# Patient Record
Sex: FEMALE | Race: WHITE | Employment: OTHER | URBAN - METROPOLITAN AREA
[De-identification: names, ages, dates, MRNs, and addresses within clinical notes are randomized per-mention and may not be internally consistent; named-entity substitution may affect disease eponyms.]

---

## 2017-01-02 ENCOUNTER — TELEPHONE (OUTPATIENT)
Dept: FAMILY MEDICINE | Facility: CLINIC | Age: 82
End: 2017-01-02

## 2017-01-02 DIAGNOSIS — R11.0 NAUSEA: Primary | ICD-10-CM

## 2017-01-02 RX ORDER — ONDANSETRON 4 MG/1
4 TABLET, ORALLY DISINTEGRATING ORAL EVERY 6 HOURS PRN
Qty: 30 TABLET | Refills: 3 | Status: SHIPPED | OUTPATIENT
Start: 2017-01-02 | End: 2017-08-09

## 2017-01-02 NOTE — TELEPHONE ENCOUNTER
Patient is requesting a new prescription for Zofran 4mg ODT tabs - not on her current med list.    Thanks,  Angelica Richards, Russ  [facility]  [phone]

## 2017-01-10 ENCOUNTER — TELEPHONE (OUTPATIENT)
Dept: FAMILY MEDICINE | Facility: CLINIC | Age: 82
End: 2017-01-10

## 2017-01-10 NOTE — TELEPHONE ENCOUNTER
Reason for Call:  Home Health Care    Tati with  Homecare called regarding (reason for call):       Orders are needed for this patient.       PT:       OT:       Skilled Nursing: private pay and HHA as requested by pt  And recertification of the orders      Pt Provider:       Phone Number Homecare Nurse can be reached at: [phone]    Can we leave a detailed message on this number? YES        Best Time: anytime    Call taken on 1/10/2017 at 1:12 PM by Tanya Clinton

## 2017-01-31 DIAGNOSIS — G89.4 CHRONIC PAIN SYNDROME: ICD-10-CM

## 2017-01-31 DIAGNOSIS — M54.9 INTRACTABLE BACK PAIN: Primary | ICD-10-CM

## 2017-01-31 NOTE — TELEPHONE ENCOUNTER
Controlled Substance Refill Request for   traMADol (ULTRAM) 50 MG tablet 100 tablet 1 9/13/2016       Problem List Complete:  Yes    Last Written Prescription Date:  09/13/2016  Last Fill Quantity: 100,   # refills: 1    Last Office Visit with [facility] primary care provider: 10/28/2016    Clinic visit frequency required: Q 3 months     Future Office visit:     Controlled substance agreement on file: Yes:  Date 10/05/2016.     Processing:  Fax Rx to [facility] pharmacy   checked in past 6 months?  No, route to RN

## 2017-01-31 NOTE — TELEPHONE ENCOUNTER
Controlled Substance Refill Request for Tramadol  Problem List Complete:  Yes    Last Written Prescription Date:  09/13/16  Last Fill Quantity: 100,   # refills: 1    Last Office Visit with [facility] primary care provider: 10/28/16    Clinic visit frequency required:      Future Office visit:     Controlled substance agreement on file: Yes:  Date 09/27/16.     Processing:  Fax Rx to [facility] pharmacy   checked in past 6 months?  February 1, 2017  Holly Benavidez RN

## 2017-02-01 RX ORDER — TRAMADOL HYDROCHLORIDE 50 MG/1
TABLET ORAL
Qty: 100 TABLET | Refills: 1 | Status: SHIPPED | OUTPATIENT
Start: 2017-02-01 | End: 2017-06-24

## 2017-02-10 DIAGNOSIS — L29.9 ITCHING: Primary | ICD-10-CM

## 2017-02-10 RX ORDER — HYDROXYZINE PAMOATE 25 MG/1
CAPSULE ORAL
Qty: 180 CAPSULE | Refills: 1 | Status: SHIPPED | OUTPATIENT
Start: 2017-02-10 | End: 2017-04-25

## 2017-02-10 NOTE — TELEPHONE ENCOUNTER
Prescription approved per [facility] Refill Protocol.  Albina Zavala RN

## 2017-02-10 NOTE — TELEPHONE ENCOUNTER
hydrOXYzine (VISTARIL) 25 MG capsule      Last Written Prescription Date: 8/12/2016  Last Fill Quantity: 60,  # refills: 1   Last Office Visit with [facility], [facility] or [facility] prescribing provider: 10/28/2016

## 2017-02-14 ENCOUNTER — OFFICE VISIT (OUTPATIENT)
Dept: CARDIOLOGY | Facility: CLINIC | Age: 82
End: 2017-02-14
Attending: NURSE PRACTITIONER
Payer: MEDICARE

## 2017-02-14 VITALS
WEIGHT: 155.1 LBS | DIASTOLIC BLOOD PRESSURE: 62 MMHG | BODY MASS INDEX: 25.84 KG/M2 | HEART RATE: 72 BPM | HEIGHT: 65 IN | SYSTOLIC BLOOD PRESSURE: 134 MMHG

## 2017-02-14 DIAGNOSIS — I48.91 ATRIAL FIBRILLATION, UNSPECIFIED TYPE (H): ICD-10-CM

## 2017-02-14 PROCEDURE — 99214 OFFICE O/P EST MOD 30 MIN: CPT | Performed by: INTERNAL MEDICINE

## 2017-02-14 NOTE — LETTER
2/14/2017    Neisha Esparza MD  [facility]      [address]  [place] [address]    RE: Helene Gibbs       Dear Colleague,    It was my pleasure seeing Ms. Helene Gibbs in follow-up of permanent atrial fibrillation with previous AV node ablation and pacemaker implantation, HFpEF (heart failure with preserved ejection fraction) and hypertension.  She is a delightful 81-year-old woman.  We have been following her for years.  She has done better after AV node ablation procedure as her rapid atrial fibrillation used to exacerbate her CHF on several occasions.      In 2015, she had issues with 2 bouts of C. difficile colitis during which she lost about 20-30 pounds.  More recently she has had difficulties with her peripheral neuropathy and spinal stenosis.  She has been approved for medical marijuana due to chronic pain in her feet and back.  She was having issues with a rectal fissure, but this has healed.      There have been no issues with fluid retention over the past year.  Her dry weight at home is approximately 150 pounds.  With her winter clothes and shoes on, she weighed 155 pounds in our clinic today.  Helene has had no chest pain, orthopnea or PND.  Her physical activity is limited because of the above issues.      PHYSICAL EXAMINATION:   VITAL SIGNS:  Blood pressure 134/62, pulse 72 and regular, weight 70.4 kg, height 165 cm.   GENERAL:  She is a very pleasant woman in no apparent distress.   NECK:  Supple with normal jugular venous pressure.   CHEST:  With moderate kyphosis.  Lungs are clear.  No apparent crackles or obvious pleural effusions.   CARDIOVASCULAR:  Regular paced rhythm.  No gallop.  There is a 2/6 systolic murmur at the xiphoid.   ABDOMEN:  Soft, nontender.   EXTREMITIES:  With chronic venous stasis changes bilaterally.  There is trace edema.      DIAGNOSTIC STUDIES:  Most recent laboratory tests:  Creatinine 1.2, potassium 4.0, sodium 135.      Interrogation of  her permanent pacemaker has revealed estimated battery life of approximately 9-10 years.  Following her AV node ablation, she does have an escape rhythm in the low 30s.     Outpatient Encounter Prescriptions as of 2/14/2017   Medication Sig Dispense Refill     hydrOXYzine (VISTARIL) 25 MG capsule TAKE 1 CAPSULE (25 MG) BY MOUTH 2 TIMES DAILY AS NEEDED FOR ITCHING 180 capsule 1     traMADol (ULTRAM) 50 MG tablet TAKE 1 TABLET BY MOUTH EVERY 6 HOURS AS NEEDED FOR MODERATE PAIN 100 tablet 1     ondansetron (ZOFRAN ODT) 4 MG ODT tab Take 1 tablet (4 mg) by mouth every 6 hours as needed for nausea 30 tablet 3     HYDROmorphone (DILAUDID) 2 MG tablet Take 1 tablet (2 mg) by mouth 2 times daily as needed for moderate to severe pain 60 tablet 0     glipiZIDE (GLUCOTROL) 5 MG tablet TAKE 1 TABLET BY MOUTH EVERY MORNING AND 0.5 TABLET EVERY EVENING 135 tablet 1     simvastatin (ZOCOR) 10 MG tablet Take 1 tablet (10 mg) by mouth At Bedtime 90 tablet 1     PROCTOSOL HC 2.5 % rectal cream PLACE RECTALLY 2 TIMES DAILY AS NEEDED FOR HEMORRHOIDS 28.35 g 5     COMPOUND (CMPD RX) - PHARMACY TO MIX COMPOUNDED MEDICATION Vaginal valium- 10 mg suppository 42 suppository 3     Tiotropium Bromide Monohydrate (SPIRIVA HANDIHALER IN)        fluocinolone (SYNALAR) 0.01 % external solution   1     betamethasone valerate (VALISONE) 0.1 % cream Apply topically 2 times daily Use sparingly 15 g 1     hydrocortisone (ANUSOL-HC) 2.5 % rectal cream Place rectally 2 times daily as needed for hemorrhoids 28 g 1     lisinopril (PRINIVIL,ZESTRIL) 10 MG tablet TAKE 1 TABLET (10 MG) BY MOUTH DAILY 90 tablet 1     apixaban ANTICOAGULANT (ELIQUIS) 5 MG tablet Take 1 tablet (5 mg) by mouth 2 times daily 180 tablet 3     fluticasone (FLONASE) 50 MCG/ACT nasal spray Spray 2 sprays into both nostrils daily        furosemide (LASIX) 20 MG tablet Take 20 mg by mouth daily Repeat every aftenoon prn weight gain of 2 lbs and or 2+ edema lower extremities        Multiple Vitamins-Minerals (PRESERVISION/LUTEIN) CAPS Take 1 capsule by mouth 2 times daily       nystatin (MYCOSTATIN) 175429 UNIT/GM POWD Apply topically 2 times daily as needed 60 g 1     albuterol (PROAIR HFA, PROVENTIL HFA, VENTOLIN HFA) 108 (90 BASE) MCG/ACT inhaler Inhale 2 puffs into the lungs every 4 hours as needed for shortness of breath / dyspnea or wheezing 1 Inhaler 5     magnesium hydroxide (MILK OF MAGNESIA) 400 MG/5ML suspension Take 30 mLs by mouth At Bedtime        senna-docusate (SENOKOT-S;PERICOLACE) 8.6-50 MG per tablet Take 2 tablets by mouth 2 times daily       hydrocortisone 0.5 % cream Apply topically 3 times daily as needed for itching (on arms) SEND HOME WITH PATIENT       oxybutynin (DITROPAN XL) 5 MG 24 hr tablet Take 1 tablet (5 mg) by mouth daily (Patient not taking: Reported on 2/14/2017) 90 tablet 3     [DISCONTINUED] PROCTOSOL HC 2.5 % cream PLACE RECTALLY 2 TIMES DAILY AS NEEDED FOR HEMORRHOIDS 28.35 g 1     [DISCONTINUED] opium-belladonna (B&O SUPPRETTES) 30-16.2 MG suppository Place 0.5 suppositories rectally every 8 hours as needed for moderate pain or bladder spasms 20 suppository 0     metroNIDAZOLE (FLAGYL) 500 MG tablet Take 1 tablet (500 mg) by mouth 2 times daily (Patient not taking: Reported on 2/14/2017) 20 tablet 0     [DISCONTINUED] glipiZIDE (GLIPIZIDE XL) 2.5 MG 24 hr tablet Take 1 tablet (2.5 mg) by mouth daily (Patient not taking: Reported on 2/14/2017) 90 tablet 1     ACCU-CHEK SMARTVIEW test strip 1 strip by In Vitro route 2 times daily Or as directed 100 each 1     blood glucose monitoring (ACCU-CHEK FASTCLIX) lancets USE TO TEST BLOOD SUGAR TWO TIMES A DAY OR AS DIRECTED 1 Box 11     ORDER FOR DME, SET TO LOCAL PRINT, Equipment being ordered: AccuChek Smart View strips  Patient tests twice daily. 1 each 11     No facility-administered encounter medications on file as of 2/14/2017.       IMPRESSION:   1.  History of rapid atrial fibrillation; s/p AV node  ablation and pacemaker implantation in 2012.  Her device functions well.  She is a compliant patient with regular follow-up in our clinic.  She is on apixaban for anticoagulation.  This has worked better than warfarin, as her INRs used to be very labile.  She is now approaching age 82 and her kidney function is moderately decreased.  We have to reconsider every year whether she should be on the regular or lower apixaban dose.  For the time being, according to guidelines, 5 mg b.i.d. is the appropriate dose.   2.  Diastolic heart failure.  Most recent echocardiogram was in 12/2015 showed normal LV function with EF of 60%-65%.  She did have mild to moderate pulmonary hypertension at that time with estimated RVSP of 42 mmHg plus right atrial pressure.  The patient appears to be euvolemic at this point.   3.  Tricuspid regurgitation.  This was 3+ by echocardiography in 2015.  I do appreciate a TR murmur today.  She does not have symptoms or signs of right-sided heart failure at this point.      RECOMMENDATIONS:   A.  Repeat echocardiogram in 1 year with follow-up with Angelica at that time.   B.  No medication changes today.      It was my pleasure seeing Ms. Gibbs.  Time spent for this appointment was 25 minutes, with greater than 50% of the time devoted to discussion and counseling.     Sincerely,    Jc Juarez MD     [facility]

## 2017-02-14 NOTE — MR AVS SNAPSHOT
After Visit Summary   2/14/2017    Helene Gibbs    MRN: [id number]           Patient Information     Date Of Birth          1935        Visit Information        Provider Department      2/14/2017 2:45 PM Jc Juarez MD [facility]        Today's Diagnoses     Atrial fibrillation, unspecified type (H)           Follow-ups after your visit        Additional Services     Follow-Up with Cardiac Advanced Practice Provider                 Your next 10 appointments already scheduled     Mar 27, 2017  1:30 PM CDT   [facility] with [facility]   [facility])    [address] [place] [id number] [phone]              Future tests that were ordered for you today     Open Future Orders        Priority Expected Expires Ordered    Follow-Up with Cardiac Advanced Practice Provider Routine 2/14/2018 6/29/2018 2/14/2017    Echocardiogram Routine 2/14/2018 3/21/2018 2/14/2017            Who to contact     If you have questions or need follow up information about today's clinic visit or your schedule please contact [facility] directly at [phone].  Normal or non-critical lab and imaging results will be communicated to you by CheckBonushart, letter or phone within 4 business days after the clinic has received the results. If you do not hear from us within 7 days, please contact the clinic through CheckBonushart or phone. If you have a critical or abnormal lab result, we will notify you by phone as soon as possible.  Submit refill requests through Lifeproof or call your pharmacy and they will forward the refill request to us. Please allow 3 business days for your refill to be completed.          Additional Information About Your Visit        MyChart Information     [employer] lets you send messages to your doctor, view your test results, renew your  "prescriptions, schedule appointments and more. To sign up, go to [email or web address] . Click on \"Log in\" on the left side of the screen, which will take you to the Welcome page. Then click on \"Sign up Now\" on the right side of the page.     You will be asked to enter the access code listed below, as well as some personal information. Please follow the directions to create your username and password.     Your access code is: [email or web address]  Expires: 5/15/2017  3:18 PM     Your access code will  in 90 days. If you need help or a new code, please call your [facility] clinic or [phone].        Care EveryWhere ID     This is your Care EveryWhere ID. This could be used by other organizations to access your [facility] medical records  [id number]        Your Vitals Were     Pulse Height Last Period BMI (Body Mass Index)          72 1.651 m (5' 5\") (LMP Unknown) 25.81 kg/m2         Blood Pressure from Last 3 Encounters:   17 134/62   16 137/79   16 152/80    Weight from Last 3 Encounters:   17 70.4 kg (155 lb 1.6 oz)   16 70.8 kg (156 lb 1.6 oz)   16 69.2 kg (152 lb 8 oz)              We Performed the Following     Follow-Up with Electrophysiologist        Primary Care Provider Office Phone # Fax #    Neisha MARIMAR Esparza [phone]       [facility]    [address]  [facility] [address]        Goals        General    start date: 14 I will weigh myself daily and if any weight gain or shortness of breath I will call the clinic. (pt-stated)     Notes - Note created  2014  3:59 PM by Margareth Pichardo, RN    As of today's date 2014 goal is met at 0 - 25%.   Goal Status:  Active        Thank you!     Thank you for choosing [facility]  for your care. Our goal is always to provide you with excellent care. Hearing back from our patients is one way we can continue to improve our " services. Please take a few minutes to complete the written survey that you may receive in the mail after your visit with us. Thank you!             Your Updated Medication List - Protect others around you: Learn how to safely use, store and throw away your medicines at www.disposemymeds.org.          This list is accurate as of: 2/14/17  3:18 PM.  Always use your most recent med list.                   Brand Name Dispense Instructions for use    ACCU-CHEK SMARTVIEW test strip   Generic drug:  blood glucose monitoring     100 each    1 strip by In Vitro route 2 times daily Or as directed       albuterol 108 (90 BASE) MCG/ACT Inhaler    PROAIR HFA/PROVENTIL HFA/VENTOLIN HFA    1 Inhaler    Inhale 2 puffs into the lungs every 4 hours as needed for shortness of breath / dyspnea or wheezing       apixaban ANTICOAGULANT 5 MG tablet    ELIQUIS    180 tablet    Take 1 tablet (5 mg) by mouth 2 times daily       betamethasone valerate 0.1 % cream    VALISONE    15 g    Apply topically 2 times daily Use sparingly       blood glucose monitoring lancets     1 Box    USE TO TEST BLOOD SUGAR TWO TIMES A DAY OR AS DIRECTED       COMPOUND - PHARMACY TO MIX COMPOUNDED MEDICATION    CMPD RX    42 suppository    Vaginal valium- 10 mg suppository       fluocinolone 0.01 % solution    SYNALAR         fluticasone 50 MCG/ACT spray    FLONASE     Spray 2 sprays into both nostrils daily       furosemide 20 MG tablet    LASIX     Take 20 mg by mouth daily Repeat every aftenoon prn weight gain of 2 lbs and or 2+ edema lower extremities       glipiZIDE 5 MG tablet    GLUCOTROL    135 tablet    TAKE 1 TABLET BY MOUTH EVERY MORNING AND 0.5 TABLET EVERY EVENING       hydrocortisone 0.5 % cream      Apply topically 3 times daily as needed for itching (on arms) SEND HOME WITH PATIENT       * hydrocortisone 2.5 % cream    ANUSOL-HC    28 g    Place rectally 2 times daily as needed for hemorrhoids       * PROCTOSOL HC 2.5 % cream   Generic drug:   hydrocortisone     28.35 g    PLACE RECTALLY 2 TIMES DAILY AS NEEDED FOR HEMORRHOIDS       HYDROmorphone 2 MG tablet    DILAUDID    60 tablet    Take 1 tablet (2 mg) by mouth 2 times daily as needed for moderate to severe pain       hydrOXYzine 25 MG capsule    VISTARIL    180 capsule    TAKE 1 CAPSULE (25 MG) BY MOUTH 2 TIMES DAILY AS NEEDED FOR ITCHING       lisinopril 10 MG tablet    PRINIVIL/ZESTRIL    90 tablet    TAKE 1 TABLET (10 MG) BY MOUTH DAILY       magnesium hydroxide 400 MG/5ML suspension    MILK OF MAGNESIA     Take 30 mLs by mouth At Bedtime       metroNIDAZOLE 500 MG tablet    FLAGYL    20 tablet    Take 1 tablet (500 mg) by mouth 2 times daily       nystatin 370865 UNIT/GM Powd    MYCOSTATIN    60 g    Apply topically 2 times daily as needed       ondansetron 4 MG ODT tab    ZOFRAN ODT    30 tablet    Take 1 tablet (4 mg) by mouth every 6 hours as needed for nausea       order for DME     1 each    Equipment being ordered: AccuChek Smart View strips Patient tests twice daily.       oxybutynin 5 MG 24 hr tablet    DITROPAN XL    90 tablet    Take 1 tablet (5 mg) by mouth daily       PRESERVISION/LUTEIN Caps      Take 1 capsule by mouth 2 times daily       senna-docusate 8.6-50 MG per tablet    SENOKOT-S;PERICOLACE     Take 2 tablets by mouth 2 times daily       simvastatin 10 MG tablet    ZOCOR    90 tablet    Take 1 tablet (10 mg) by mouth At Bedtime       SPIRIVA HANDIHALER IN          traMADol 50 MG tablet    ULTRAM    100 tablet    TAKE 1 TABLET BY MOUTH EVERY 6 HOURS AS NEEDED FOR MODERATE PAIN       * Notice:  This list has 2 medication(s) that are the same as other medications prescribed for you. Read the directions carefully, and ask your doctor or other care provider to review them with you.

## 2017-02-14 NOTE — PROGRESS NOTES
HPI and Plan:   See dictation    Orders Placed This Encounter   Procedures     Follow-Up with Cardiac Advanced Practice Provider     Echocardiogram       No orders of the defined types were placed in this encounter.      Medications Discontinued During This Encounter   Medication Reason     glipiZIDE (GLIPIZIDE XL) 2.5 MG 24 hr tablet Discontinued by another Health Care Provider     opium-belladonna (B&O SUPPRETTES) 30-16.2 MG suppository Discontinued by another Health Care Provider     PROCTOSOL HC 2.5 % cream Medication Reconciliation Clean Up         Encounter Diagnosis   Name Primary?     Atrial fibrillation, unspecified type (H)        CURRENT MEDICATIONS:  Current Outpatient Prescriptions   Medication Sig Dispense Refill     hydrOXYzine (VISTARIL) 25 MG capsule TAKE 1 CAPSULE (25 MG) BY MOUTH 2 TIMES DAILY AS NEEDED FOR ITCHING 180 capsule 1     traMADol (ULTRAM) 50 MG tablet TAKE 1 TABLET BY MOUTH EVERY 6 HOURS AS NEEDED FOR MODERATE PAIN 100 tablet 1     ondansetron (ZOFRAN ODT) 4 MG ODT tab Take 1 tablet (4 mg) by mouth every 6 hours as needed for nausea 30 tablet 3     HYDROmorphone (DILAUDID) 2 MG tablet Take 1 tablet (2 mg) by mouth 2 times daily as needed for moderate to severe pain 60 tablet 0     glipiZIDE (GLUCOTROL) 5 MG tablet TAKE 1 TABLET BY MOUTH EVERY MORNING AND 0.5 TABLET EVERY EVENING 135 tablet 1     simvastatin (ZOCOR) 10 MG tablet Take 1 tablet (10 mg) by mouth At Bedtime 90 tablet 1     PROCTOSOL HC 2.5 % rectal cream PLACE RECTALLY 2 TIMES DAILY AS NEEDED FOR HEMORRHOIDS 28.35 g 5     COMPOUND (CMPD RX) - PHARMACY TO MIX COMPOUNDED MEDICATION Vaginal valium- 10 mg suppository 42 suppository 3     Tiotropium Bromide Monohydrate (SPIRIVA HANDIHALER IN)        fluocinolone (SYNALAR) 0.01 % external solution   1     betamethasone valerate (VALISONE) 0.1 % cream Apply topically 2 times daily Use sparingly 15 g 1     hydrocortisone (ANUSOL-HC) 2.5 % rectal cream Place rectally 2 times daily  as needed for hemorrhoids 28 g 1     lisinopril (PRINIVIL,ZESTRIL) 10 MG tablet TAKE 1 TABLET (10 MG) BY MOUTH DAILY 90 tablet 1     apixaban ANTICOAGULANT (ELIQUIS) 5 MG tablet Take 1 tablet (5 mg) by mouth 2 times daily 180 tablet 3     fluticasone (FLONASE) 50 MCG/ACT nasal spray Spray 2 sprays into both nostrils daily        furosemide (LASIX) 20 MG tablet Take 20 mg by mouth daily Repeat every aftenoon prn weight gain of 2 lbs and or 2+ edema lower extremities       Multiple Vitamins-Minerals (PRESERVISION/LUTEIN) CAPS Take 1 capsule by mouth 2 times daily       nystatin (MYCOSTATIN) 349518 UNIT/GM POWD Apply topically 2 times daily as needed 60 g 1     albuterol (PROAIR HFA, PROVENTIL HFA, VENTOLIN HFA) 108 (90 BASE) MCG/ACT inhaler Inhale 2 puffs into the lungs every 4 hours as needed for shortness of breath / dyspnea or wheezing 1 Inhaler 5     magnesium hydroxide (MILK OF MAGNESIA) 400 MG/5ML suspension Take 30 mLs by mouth At Bedtime        senna-docusate (SENOKOT-S;PERICOLACE) 8.6-50 MG per tablet Take 2 tablets by mouth 2 times daily       hydrocortisone 0.5 % cream Apply topically 3 times daily as needed for itching (on arms) SEND HOME WITH PATIENT       oxybutynin (DITROPAN XL) 5 MG 24 hr tablet Take 1 tablet (5 mg) by mouth daily (Patient not taking: Reported on 2/14/2017) 90 tablet 3     metroNIDAZOLE (FLAGYL) 500 MG tablet Take 1 tablet (500 mg) by mouth 2 times daily (Patient not taking: Reported on 2/14/2017) 20 tablet 0     ACCU-CHEK SMARTVIEW test strip 1 strip by In Vitro route 2 times daily Or as directed 100 each 1     blood glucose monitoring (ACCU-CHEK FASTCLIX) lancets USE TO TEST BLOOD SUGAR TWO TIMES A DAY OR AS DIRECTED 1 Box 11     ORDER FOR DME, SET TO LOCAL PRINT, Equipment being ordered: AccuChek Smart View strips  Patient tests twice daily. 1 each 11       ALLERGIES     Allergies   Allergen Reactions     Penicillins Hives     Ambien [Zolpidem Tartrate]      Hallucinations and fell  out of bed at night.     Definity      Caused a syncopal episode.     Sulfa Drugs Itching     Cymbalta Rash     Fluconazole Rash       PAST MEDICAL HISTORY:  Past Medical History   Diagnosis Date     Anemia      Ankle arthritis      Arthritis      Back pain      Bell's palsy 9/08     left     Breast CA (H) 2004-     left lumpectomy, radiation, -recurrence     Colon polyps      colonoscopy-9/12-next due in 9/13     Congestive heart failure (H)      COPD (chronic obstructive pulmonary disease) (H)      Coronary artery disease      DM (diabetes mellitus) (H)      GERD (gastroesophageal reflux disease)      Hyperlipidemia      Hypertension      Lumbar spinal stenosis      use cane     Obesity      Osteoporosis      Other chronic pain      Pacemaker      Permanent atrial fibrillation (H) 8/2008     S/P AV node ablation and pacemaker 10-25-12       Pleural effusion      Pulmonary hypertension (H)      Rectal stenosis      Tobacco abuse      current       PAST SURGICAL HISTORY:  Past Surgical History   Procedure Laterality Date     Arthroscopy shoulder rt/lt  1999, 2004     Bilateral, right then left     Joint replacemtn, knee rt/lt       Joint Replacement knee bilateral     Hc colonoscopy thru stoma, diagnostic  ? 2005     C mammogram, screening  1/2009     C dexa, bone density, axial skel       Lumpectomy breast       Left-2004     Implant pacemaker  10/2012     [place] [id number], [id number]     H ablation av node  10/2012     Phacoemulsification clear cornea with standard intraocular lens implant Left 7/16/2015     Procedure: PHACOEMULSIFICATION CLEAR CORNEA WITH STANDARD INTRAOCULAR LENS IMPLANT;  Surgeon: Sai Obregon MD;  Location:  [facility]     Laparoscopic cholecystectomy with cholangiograms N/A 12/14/2015     Procedure: LAPAROSCOPIC CHOLECYSTECTOMY WITH CHOLANGIOGRAMS;  Surgeon: Ervin Amos MD;  Location:  [facility]     Colonoscopy N/A 10/7/2016     Procedure: COMBINED COLONOSCOPY, SINGLE OR MULTIPLE  BIOPSY/POLYPECTOMY BY BIOPSY;  Surgeon: Cassandra Mccord MD;  [facility]       FAMILY HISTORY:  Family History   Problem Relation Age of Onset     Hypertension Mother      DIABETES Mother       at 83 yrs     C.A.D. Father       age 70s       SOCIAL HISTORY:  Social History     Social History     Marital status:      Spouse name: N/A     Number of children: N/A     Years of education: N/A     Social History Main Topics     Smoking status: Current Some Day Smoker     Packs/day: 0.50     Years: 45.00     Types: Cigarettes     Smokeless tobacco: Never Used      Comment: 01/25/15 6 or less cigarettes per day, intermittent     Alcohol use No     Drug use: No     Sexual activity: Not Currently     Partners: Female     Other Topics Concern     Caffeine Concern Yes     2 cups/day     Sleep Concern Yes     Stress Concern Yes     Weight Concern Yes     15+ lb weight loss since hospitalization      Special Diet Yes     bland diet r/t cholecystectomy, low salt      Exercise No     Seat Belt Yes     Social History Narrative    ,no childrenPOA- niece-Enedelia Claros-has plans to quitETOH-1/drink 2-3 /weekExercise-sit down DNR, DNI    Lived in [place] and [place] worked in [employer]       Review of Systems:  Skin:  Negative       Eyes:  Positive for glasses reading  ENT:  Negative      Respiratory:  Negative       Cardiovascular:  Negative      Gastroenterology: Negative      Genitourinary:  not assessed      Musculoskeletal:  Positive for back pain;arthritis    Neurologic:  Positive for numbness or tingling of feet    Psychiatric:  Negative      Heme/Lymph/Imm:  Negative      Endocrine:  Positive for diabetes      [id number]    CC  Angelica Grady APRN CNP  [facility]  [address]  [place] [address]

## 2017-02-15 NOTE — PROGRESS NOTES
HISTORY OF PRESENT ILLNESS:    It was my pleasure seeing Ms. Helene Gibbs in follow-up of permanent atrial fibrillation with previous AV node ablation and pacemaker implantation, HFpEF (heart failure with preserved ejection fraction) and hypertension.  She is a delightful 81-year-old woman.  We have been following her for years.  She has done better after AV node ablation procedure as her rapid atrial fibrillation used to exacerbate her CHF on several occasions.      In 2015, she had issues with 2 bouts of C. difficile colitis during which she lost about 20-30 pounds.  More recently she has had difficulties with her peripheral neuropathy and spinal stenosis.  She has been approved for medical marijuana due to chronic pain in her feet and back.  She was having issues with a rectal fissure, but this has healed.      There have been no issues with fluid retention over the past year.  Her dry weight at home is approximately 150 pounds.  With her winter clothes and shoes on, she weighed 155 pounds in our clinic today.  Helene has had no chest pain, orthopnea or PND.  Her physical activity is limited because of the above issues.      PHYSICAL EXAMINATION:   VITAL SIGNS:  Blood pressure 134/62, pulse 72 and regular, weight 70.4 kg, height 165 cm.   GENERAL:  She is a very pleasant woman in no apparent distress.   NECK:  Supple with normal jugular venous pressure.   CHEST:  With moderate kyphosis.  Lungs are clear.  No apparent crackles or obvious pleural effusions.   CARDIOVASCULAR:  Regular paced rhythm.  No gallop.  There is a 2/6 systolic murmur at the xiphoid.   ABDOMEN:  Soft, nontender.   EXTREMITIES:  With chronic venous stasis changes bilaterally.  There is trace edema.      DIAGNOSTIC STUDIES:  Most recent laboratory tests:  Creatinine 1.2, potassium 4.0, sodium 135.      Interrogation of her permanent pacemaker has revealed estimated battery life of approximately 9-10 years.  Following her AV node ablation, she  does have an escape rhythm in the low 30s.      IMPRESSION:   1.  History of rapid atrial fibrillation; s/p AV node ablation and pacemaker implantation in .  Her device functions well.  She is a compliant patient with regular follow-up in our clinic.  She is on apixaban for anticoagulation.  This has worked better than warfarin, as her INRs used to be very labile.  She is now approaching age 82 and her kidney function is moderately decreased.  We have to reconsider every year whether she should be on the regular or lower apixaban dose.  For the time being, according to guidelines, 5 mg b.i.d. is the appropriate dose.   2.  Diastolic heart failure.  Most recent echocardiogram was in 2015 showed normal LV function with EF of 60%-65%.  She did have mild to moderate pulmonary hypertension at that time with estimated RVSP of 42 mmHg plus right atrial pressure.  The patient appears to be euvolemic at this point.   3.  Tricuspid regurgitation.  This was 3+ by echocardiography in .  I do appreciate a TR murmur today.  She does not have symptoms or signs of right-sided heart failure at this point.      RECOMMENDATIONS:   A.  Repeat echocardiogram in 1 year with follow-up with Angelica at that time.   B.  No medication changes today.      It was my pleasure seeing Ms. Bobo.  Time spent for this appointment was 25 minutes, with greater than 50% of the time devoted to discussion and counseling.      CARSON VILLALPANDO MD, [facility]         cc:   Neisha Esparza MD    [address], [address]    [place] [address]             D: 2017 15:18   T: 02/15/2017 00:38   MT: NATALEE      Name:     PATRICIA BOBO   MRN:      [id number]        Account:      [id number]   :      1935           Service Date: 2017      Document: [id number]

## 2017-03-03 ENCOUNTER — TELEPHONE (OUTPATIENT)
Dept: FAMILY MEDICINE | Facility: CLINIC | Age: 82
End: 2017-03-03

## 2017-03-03 NOTE — TELEPHONE ENCOUNTER
Reason for Call:  Home Health Care    Tati with [facility] called regarding (reason for call): orders    Orders are needed for this patient. Nursing    Skilled Nursing: Delay re-start per patient request for consistency    Phone Number Homecare Nurse can be reached at: [phone]    Can we leave a detailed message on this number? YES    Phone number patient can be reached at: NA    Best Time: anytime    Call taken on 3/3/2017 at 7:48 AM by Betzaida Fisher

## 2017-03-03 NOTE — TELEPHONE ENCOUNTER
Verbal approval given per request below. Homecare/Hospice agency to fax orders for provider signature.  Brandi Hinojosa RN

## 2017-03-27 ENCOUNTER — ALLIED HEALTH/NURSE VISIT (OUTPATIENT)
Dept: CARDIOLOGY | Facility: CLINIC | Age: 82
End: 2017-03-27
Payer: MEDICARE

## 2017-03-27 DIAGNOSIS — Z95.0 CARDIAC PACEMAKER IN SITU: Primary | ICD-10-CM

## 2017-03-27 PROCEDURE — 93293 PM PHONE R-STRIP DEVICE EVAL: CPT | Performed by: INTERNAL MEDICINE

## 2017-03-27 NOTE — MR AVS SNAPSHOT
"              After Visit Summary   3/27/2017    Heleen Gibbs    MRN: [id number]           Patient Information     Date Of Birth          1935        Visit Information        Provider Department      3/27/2017 1:30 PM STONE TECH1 [facility] HE[facility] AT [place]        Today's Diagnoses     Cardiac pacemaker in situ    -  1       Follow-ups after your visit        Your next 10 appointments already scheduled     Apr 25, 2017   Procedure with Duc Richmond MD   [facility] (--)    [address]  [facility] [id number] [phone]            Jun 26, 2017  1:45 PM CDT   Pacemaker Check with STONE DCRN   [facility] ([facility])    [address]  [place] [place] [address] [phone]              Who to contact     If you have questions or need follow up information about today's clinic visit or your schedule please contact [facility] directly at [phone].  Normal or non-critical lab and imaging results will be communicated to you by [employer], letter or phone within 4 business days after the clinic has received the results. If you do not hear from us within 7 days, please contact the clinic through [employer] or phone. If you have a critical or abnormal lab result, we will notify you by phone as soon as possible.  Submit refill requests through Poshly or call your pharmacy and they will forward the refill request to us. Please allow 3 business days for your refill to be completed.          Additional Information About Your Visit        Vita [employer] Information     Poshly lets you send messages to your doctor, view your test results, renew your prescriptions, schedule appointments and more. To sign up, go to [email or web address] . Click on \"Log in\" on the left side of the screen, which will take you to the Welcome page. Then click on \"Sign up Now\" on " the right side of the page.     You will be asked to enter the access code listed below, as well as some personal information. Please follow the directions to create your username and password.     Your access code is: [email or web address]  Expires: 5/15/2017  4:18 PM     Your access code will  in 90 days. If you need help or a new code, please call your [place] clinic or [phone].        Care EveryWhere ID     This is your Care EveryWhere ID. This could be used by other organizations to access your [place] medical records  [id number]        Your Vitals Were     Last Period                   (LMP Unknown)            Blood Pressure from Last 3 Encounters:   17 134/62   16 137/79   16 152/80    Weight from Last 3 Encounters:   17 70.4 kg (155 lb 1.6 oz)   16 70.8 kg (156 lb 1.6 oz)   16 69.2 kg (152 lb 8 oz)              We Performed the Following     PM PHONE R STRIP EVAL UP TO 90 DAYS ([phone])        Primary Care Provider Office Phone # Fax #    Neisha MARIMAR Esparza [phone]       [facility]    [address]  [facility] [address]        Goals        General    start date: 14 I will weigh myself daily and if any weight gain or shortness of breath I will call the clinic. (pt-stated)     Notes - Note created  2014  3:59 PM by Margareth Pichardo, RN    As of today's date 2014 goal is met at 0 - 25%.   Goal Status:  Active        Thank you!     Thank you for choosing [facility] [place]  for your care. Our goal is always to provide you with excellent care. Hearing back from our patients is one way we can continue to improve our services. Please take a few minutes to complete the written survey that you may receive in the mail after your visit with us. Thank you!             Your Updated Medication List - Protect others around you: Learn how to safely use, store and throw away your  medicines at www.disposemymeds.org.          This list is accurate as of: 3/27/17  1:45 PM.  Always use your most recent med list.                   Brand Name Dispense Instructions for use    ACCU-CHEK SMARTVIEW test strip   Generic drug:  blood glucose monitoring     100 each    1 strip by In Vitro route 2 times daily Or as directed       albuterol 108 (90 BASE) MCG/ACT Inhaler    PROAIR HFA/PROVENTIL HFA/VENTOLIN HFA    1 Inhaler    Inhale 2 puffs into the lungs every 4 hours as needed for shortness of breath / dyspnea or wheezing       apixaban ANTICOAGULANT 5 MG tablet    ELIQUIS    180 tablet    Take 1 tablet (5 mg) by mouth 2 times daily       betamethasone valerate 0.1 % cream    VALISONE    15 g    Apply topically 2 times daily Use sparingly       blood glucose monitoring lancets     1 Box    USE TO TEST BLOOD SUGAR TWO TIMES A DAY OR AS DIRECTED       COMPOUND - PHARMACY TO MIX COMPOUNDED MEDICATION    CMPD RX    42 suppository    Vaginal valium- 10 mg suppository       fluocinolone 0.01 % solution    SYNALAR         * fluticasone 50 MCG/ACT spray    FLONASE     Spray 2 sprays into both nostrils daily       * fluticasone 50 MCG/ACT spray    FLONASE    16 mL    INHALE 1 TO 2 SPRAYS INTO BOTH NOSTRILS DAILY       furosemide 20 MG tablet    LASIX     Take 20 mg by mouth daily Repeat every aftenoon prn weight gain of 2 lbs and or 2+ edema lower extremities       glipiZIDE 5 MG tablet    GLUCOTROL    135 tablet    TAKE 1 TABLET BY MOUTH EVERY MORNING AND 0.5 TABLET EVERY EVENING       hydrocortisone 0.5 % cream      Apply topically 3 times daily as needed for itching (on arms) SEND HOME WITH PATIENT       * hydrocortisone 2.5 % cream    ANUSOL-HC    28 g    Place rectally 2 times daily as needed for hemorrhoids       * PROCTOSOL HC 2.5 % cream   Generic drug:  hydrocortisone     28.35 g    PLACE RECTALLY 2 TIMES DAILY AS NEEDED FOR HEMORRHOIDS       HYDROmorphone 2 MG tablet    DILAUDID    60 tablet    Take 1  tablet (2 mg) by mouth 2 times daily as needed for moderate to severe pain       hydrOXYzine 25 MG capsule    VISTARIL    180 capsule    TAKE 1 CAPSULE (25 MG) BY MOUTH 2 TIMES DAILY AS NEEDED FOR ITCHING       lisinopril 10 MG tablet    PRINIVIL/ZESTRIL    90 tablet    TAKE 1 TABLET (10 MG) BY MOUTH DAILY       magnesium hydroxide 400 MG/5ML suspension    MILK OF MAGNESIA     Take 30 mLs by mouth At Bedtime       metroNIDAZOLE 500 MG tablet    FLAGYL    20 tablet    Take 1 tablet (500 mg) by mouth 2 times daily       nystatin 335064 UNIT/GM Powd    MYCOSTATIN    60 g    Apply topically 2 times daily as needed       ondansetron 4 MG ODT tab    ZOFRAN ODT    30 tablet    Take 1 tablet (4 mg) by mouth every 6 hours as needed for nausea       order for DME     1 each    Equipment being ordered: AccuChek Smart View strips Patient tests twice daily.       oxybutynin 5 MG 24 hr tablet    DITROPAN XL    90 tablet    Take 1 tablet (5 mg) by mouth daily       PRESERVISION/LUTEIN Caps      Take 1 capsule by mouth 2 times daily       senna-docusate 8.6-50 MG per tablet    SENOKOT-S;PERICOLACE     Take 2 tablets by mouth 2 times daily       simvastatin 10 MG tablet    ZOCOR    90 tablet    Take 1 tablet (10 mg) by mouth At Bedtime       SPIRIVA HANDIHALER IN          traMADol 50 MG tablet    ULTRAM    100 tablet    TAKE 1 TABLET BY MOUTH EVERY 6 HOURS AS NEEDED FOR MODERATE PAIN       * Notice:  This list has 4 medication(s) that are the same as other medications prescribed for you. Read the directions carefully, and ask your doctor or other care provider to review them with you.

## 2017-03-27 NOTE — PROGRESS NOTES
~ 90 day office teletrace ~  Appropriate  at time of check. Chronic Afib, taking Eliquis. Magnet response WNL. Annual threshold scheduled in June. Bruna RUIZ

## 2017-04-11 DIAGNOSIS — I10 ESSENTIAL HYPERTENSION: ICD-10-CM

## 2017-04-11 RX ORDER — LISINOPRIL 5 MG/1
TABLET ORAL
Qty: 90 TABLET | Refills: 1 | Status: SHIPPED | OUTPATIENT
Start: 2017-04-11 | End: 2017-08-09

## 2017-04-11 NOTE — TELEPHONE ENCOUNTER
lisinopril (PRINIVIL/ZESTRIL) 5 MG tablet        Last Written Prescription Date: 9/13/2016  Last Fill Quantity: 90, # refills: 1  Last Office Visit with [facility], [facility] or [facility] prescribing provider: 10/28/2016  Next 5 appointments (look out 90 days)     Apr 24, 2017  3:00 PM CDT   [facility] Visit with [facility]    [address] [address]   [phone]                   Potassium   Date Value Ref Range Status   10/28/2016 4.0 3.4 - 5.3 mmol/L Final     Creatinine   Date Value Ref Range Status   10/28/2016 1.20 (H) 0.52 - 1.04 mg/dL Final     BP Readings from Last 3 Encounters:   02/14/17 134/62   12/20/16 137/79   11/08/16 152/80

## 2017-04-11 NOTE — TELEPHONE ENCOUNTER
Prescription approved per [facility] Refill Protocol.  Holly Benavidez RN

## 2017-04-24 ENCOUNTER — TELEPHONE (OUTPATIENT)
Dept: CARDIOLOGY | Facility: CLINIC | Age: 82
End: 2017-04-24

## 2017-04-24 NOTE — TELEPHONE ENCOUNTER
Pt called and stated that she needed to have cataract surgery in may and needing to know the hold time for her Eliquis. Pt has held Eliquis in the past, which a lovenox bridge once and then 2 1/2 days the next time, both in 2015. Pt has no history of CVA or valve replacement. Explained that this writer thought is pt will need to hold for 2-3 days with no bridging, but because of the past recommendations, would like to verify this with Dr Juarez who is out of the office until Wednesday. Pt stated understanding. Iman

## 2017-04-25 ENCOUNTER — OFFICE VISIT (OUTPATIENT)
Dept: FAMILY MEDICINE | Facility: CLINIC | Age: 82
End: 2017-04-25
Payer: MEDICARE

## 2017-04-25 ENCOUNTER — HOSPITAL ENCOUNTER (OUTPATIENT)
Dept: MAMMOGRAPHY | Facility: CLINIC | Age: 82
Discharge: HOME | End: 2017-04-25
Attending: INTERNAL MEDICINE | Admitting: INTERNAL MEDICINE
Payer: MEDICARE

## 2017-04-25 VITALS
HEIGHT: 65 IN | HEART RATE: 70 BPM | SYSTOLIC BLOOD PRESSURE: 126 MMHG | WEIGHT: 154.1 LBS | OXYGEN SATURATION: 100 % | TEMPERATURE: 97.5 F | BODY MASS INDEX: 25.67 KG/M2 | DIASTOLIC BLOOD PRESSURE: 70 MMHG

## 2017-04-25 DIAGNOSIS — Z79.899 MEDICATION MANAGEMENT: ICD-10-CM

## 2017-04-25 DIAGNOSIS — K59.09 CHRONIC CONSTIPATION: ICD-10-CM

## 2017-04-25 DIAGNOSIS — D64.9 ANEMIA, UNSPECIFIED TYPE: ICD-10-CM

## 2017-04-25 DIAGNOSIS — G62.9 NEUROPATHY: ICD-10-CM

## 2017-04-25 DIAGNOSIS — L29.9 ITCHING: ICD-10-CM

## 2017-04-25 DIAGNOSIS — E11.21 TYPE 2 DIABETES MELLITUS WITH DIABETIC NEPHROPATHY, WITHOUT LONG-TERM CURRENT USE OF INSULIN (H): ICD-10-CM

## 2017-04-25 DIAGNOSIS — Z01.818 PREOP GENERAL PHYSICAL EXAM: Primary | ICD-10-CM

## 2017-04-25 DIAGNOSIS — H26.9 CATARACT: ICD-10-CM

## 2017-04-25 DIAGNOSIS — Z13.89 SCREENING FOR DIABETIC PERIPHERAL NEUROPATHY: ICD-10-CM

## 2017-04-25 DIAGNOSIS — Z12.31 VISIT FOR SCREENING MAMMOGRAM: ICD-10-CM

## 2017-04-25 DIAGNOSIS — G89.4 CHRONIC PAIN SYNDROME: ICD-10-CM

## 2017-04-25 DIAGNOSIS — R35.0 URINARY FREQUENCY: ICD-10-CM

## 2017-04-25 LAB
ALBUMIN UR-MCNC: 100 MG/DL
APPEARANCE UR: CLEAR
BACTERIA #/AREA URNS HPF: ABNORMAL /HPF
BILIRUB UR QL STRIP: NEGATIVE
COLOR UR AUTO: YELLOW
ERYTHROCYTE [DISTWIDTH] IN BLOOD BY AUTOMATED COUNT: 14.1 % (ref 10–15)
GLUCOSE UR STRIP-MCNC: NEGATIVE MG/DL
HBA1C MFR BLD: 6.4 % (ref 4.3–6)
HCT VFR BLD AUTO: 36.9 % (ref 35–47)
HGB BLD-MCNC: 11.8 G/DL (ref 11.7–15.7)
HGB UR QL STRIP: NEGATIVE
KETONES UR STRIP-MCNC: NEGATIVE MG/DL
LEUKOCYTE ESTERASE UR QL STRIP: NEGATIVE
MCH RBC QN AUTO: 29.7 PG (ref 26.5–33)
MCHC RBC AUTO-ENTMCNC: 32 G/DL (ref 31.5–36.5)
MCV RBC AUTO: 93 FL (ref 78–100)
NITRATE UR QL: NEGATIVE
NON-SQ EPI CELLS #/AREA URNS LPF: ABNORMAL /LPF
PH UR STRIP: 7 PH (ref 5–7)
PLATELET # BLD AUTO: 291 10E9/L (ref 150–450)
RBC # BLD AUTO: 3.97 10E12/L (ref 3.8–5.2)
RBC #/AREA URNS AUTO: ABNORMAL /HPF (ref 0–2)
SP GR UR STRIP: 1.01 (ref 1–1.03)
URN SPEC COLLECT METH UR: ABNORMAL
UROBILINOGEN UR STRIP-ACNC: 0.2 EU/DL (ref 0.2–1)
WBC # BLD AUTO: 7 10E9/L (ref 4–11)
WBC #/AREA URNS AUTO: ABNORMAL /HPF (ref 0–2)

## 2017-04-25 PROCEDURE — 81001 URINALYSIS AUTO W/SCOPE: CPT | Performed by: INTERNAL MEDICINE

## 2017-04-25 PROCEDURE — 85027 COMPLETE CBC AUTOMATED: CPT | Performed by: INTERNAL MEDICINE

## 2017-04-25 PROCEDURE — 80053 COMPREHEN METABOLIC PANEL: CPT | Performed by: INTERNAL MEDICINE

## 2017-04-25 PROCEDURE — G0202 SCR MAMMO BI INCL CAD: HCPCS

## 2017-04-25 PROCEDURE — 36415 COLL VENOUS BLD VENIPUNCTURE: CPT | Performed by: INTERNAL MEDICINE

## 2017-04-25 PROCEDURE — 84443 ASSAY THYROID STIM HORMONE: CPT | Performed by: INTERNAL MEDICINE

## 2017-04-25 PROCEDURE — 80306 DRUG TEST PRSMV INSTRMNT: CPT | Performed by: INTERNAL MEDICINE

## 2017-04-25 PROCEDURE — 99207 C FOOT EXAM  NO CHARGE: CPT | Performed by: INTERNAL MEDICINE

## 2017-04-25 PROCEDURE — 80061 LIPID PANEL: CPT | Performed by: INTERNAL MEDICINE

## 2017-04-25 PROCEDURE — 77063 BREAST TOMOSYNTHESIS BI: CPT

## 2017-04-25 PROCEDURE — 99215 OFFICE O/P EST HI 40 MIN: CPT | Performed by: INTERNAL MEDICINE

## 2017-04-25 PROCEDURE — 83036 HEMOGLOBIN GLYCOSYLATED A1C: CPT | Performed by: INTERNAL MEDICINE

## 2017-04-25 RX ORDER — GABAPENTIN 300 MG/1
300 CAPSULE ORAL 2 TIMES DAILY
Qty: 60 CAPSULE | Refills: 3 | Status: ON HOLD | OUTPATIENT
Start: 2017-04-25 | End: 2017-05-09

## 2017-04-25 RX ORDER — HYDROXYZINE PAMOATE 25 MG/1
25 CAPSULE ORAL 2 TIMES DAILY PRN
Qty: 180 CAPSULE | Refills: 1 | Status: SHIPPED | OUTPATIENT
Start: 2017-04-25 | End: 2019-01-01

## 2017-04-25 ASSESSMENT — ANXIETY QUESTIONNAIRES
2. NOT BEING ABLE TO STOP OR CONTROL WORRYING: SEVERAL DAYS
7. FEELING AFRAID AS IF SOMETHING AWFUL MIGHT HAPPEN: NOT AT ALL
IF YOU CHECKED OFF ANY PROBLEMS ON THIS QUESTIONNAIRE, HOW DIFFICULT HAVE THESE PROBLEMS MADE IT FOR YOU TO DO YOUR WORK, TAKE CARE OF THINGS AT HOME, OR GET ALONG WITH OTHER PEOPLE: SOMEWHAT DIFFICULT
1. FEELING NERVOUS, ANXIOUS, OR ON EDGE: NOT AT ALL
GAD7 TOTAL SCORE: 3
3. WORRYING TOO MUCH ABOUT DIFFERENT THINGS: SEVERAL DAYS
6. BECOMING EASILY ANNOYED OR IRRITABLE: NOT AT ALL
5. BEING SO RESTLESS THAT IT IS HARD TO SIT STILL: NOT AT ALL

## 2017-04-25 ASSESSMENT — PATIENT HEALTH QUESTIONNAIRE - PHQ9: 5. POOR APPETITE OR OVEREATING: SEVERAL DAYS

## 2017-04-25 NOTE — PATIENT INSTRUCTIONS
Before Your Surgery      Call your surgeon if there is any change in your health. This includes signs of a cold or flu (such as a sore throat, runny nose, cough, rash or fever).    Do not smoke, drink alcohol or take over the counter medicine (unless your surgeon or primary care doctor tells you to) for the 24 hours before and after surgery.    If you take prescribed drugs: Follow your doctor s orders about which medicines to take and which to stop until after surgery.    Eating and drinking prior to surgery: follow the instructions from your surgeon    Take a shower or bath the night before surgery. Use the soap your surgeon gave you to gently clean your skin. If you do not have soap from your surgeon, use your regular soap. Do not shave or scrub the surgery site.  Wear clean pajamas and have clean sheets on your bed.     Labs today  Hold lisinopril , furosemide, glipizide and eliquis on morning of surgery   Resume after surgery

## 2017-04-25 NOTE — TELEPHONE ENCOUNTER
Hold Eliquis for 3 days.  Restart 24 hrs after procedure, if OK with her opthalmologist.  No bridging.    D

## 2017-04-25 NOTE — MR AVS SNAPSHOT
After Visit Summary   4/25/2017    Helene Gibbs    MRN: [id number]           Patient Information     Date Of Birth          1935        Visit Information        Provider Department      4/25/2017 2:00 PM Neisha Esparza MD [facility]        Today's Diagnoses     Preop general physical exam    -  1    Cataract        Type 2 diabetes mellitus with diabetic nephropathy, without long-term current use of insulin (H)        Screening for diabetic peripheral neuropathy        Itching        Anemia, unspecified type        Chronic constipation        Medication management        Urinary frequency          Care Instructions      Before Your Surgery      Call your surgeon if there is any change in your health. This includes signs of a cold or flu (such as a sore throat, runny nose, cough, rash or fever).    Do not smoke, drink alcohol or take over the counter medicine (unless your surgeon or primary care doctor tells you to) for the 24 hours before and after surgery.    If you take prescribed drugs: Follow your doctor s orders about which medicines to take and which to stop until after surgery.    Eating and drinking prior to surgery: follow the instructions from your surgeon    Take a shower or bath the night before surgery. Use the soap your surgeon gave you to gently clean your skin. If you do not have soap from your surgeon, use your regular soap. Do not shave or scrub the surgery site.  Wear clean pajamas and have clean sheets on your bed.     Labs today  Hold lisinopril , furosemide, glipizide and eliquis on morning of surgery   Resume after surgery         Follow-ups after your visit        Your next 10 appointments already scheduled     May 09, 2017   Procedure with Duc Richmond MD   [facility] (--)    [address]  [facility] [address]   [phone]            Jun 26, 2017  1:45 PM CDT   Pacemaker Check with CHASE SAN   [facility]  "[facility])    [address]  [place] [address] [phone]              Who to contact     If you have questions or need follow up information about today's clinic visit or your schedule please contact [facility] directly at [phone].  Normal or non-critical lab and imaging results will be communicated to you by MyChart, letter or phone within 4 business days after the clinic has received the results. If you do not hear from us within 7 days, please contact the clinic through MyChart or phone. If you have a critical or abnormal lab result, we will notify you by phone as soon as possible.  Submit refill requests through [employer] or call your pharmacy and they will forward the refill request to us. Please allow 3 business days for your refill to be completed.          Additional Information About Your Visit        Care EveryWhere ID     This is your Care EveryWhere ID. This could be used by other organizations to access your [place] medical records  [id number]        Your Vitals Were     Pulse Temperature Height Last Period Pulse Oximetry BMI (Body Mass Index)    70 97.5  F (36.4  C) (Oral) 5' 5\" (1.651 m) (LMP Unknown) 100% 25.64 kg/m2       Blood Pressure from Last 3 Encounters:   04/25/17 126/70   02/14/17 134/62   12/20/16 137/79    Weight from Last 3 Encounters:   04/25/17 154 lb 1.6 oz (69.9 kg)   02/14/17 155 lb 1.6 oz (70.4 kg)   12/20/16 156 lb 1.6 oz (70.8 kg)              We Performed the Following     CBC with platelets     Comprehensive metabolic panel     Drug Abuse Screen Panel 13, Urine (Pain Care Package)     FOOT EXAM     HEMOGLOBIN A1C     Lipid panel reflex to direct LDL     UA with Microscopic reflex to Culture          Today's Medication Changes          These changes are accurate as of: 4/25/17  2:39 PM.  If you have any questions, ask your nurse or doctor.               These medicines have changed or " have updated prescriptions.        Dose/Directions    hydrOXYzine 25 MG capsule   Commonly known as:  VISTARIL   This may have changed:  See the new instructions.   Used for:  Itching   Changed by:  Nesiha Esparza MD        Dose:  25 mg   Take 1 capsule (25 mg) by mouth 2 times daily as needed for itching   Quantity:  180 capsule   Refills:  1            Where to get your medicines      These medications were sent to [facility] - [place], [place] - [address]  [address], [facility] [address]     Phone:  [phone]     hydrOXYzine 25 MG capsule                Primary Care Provider Office Phone # Fax #    Neisha Esparza [phone]       [facility]    [address]  [facility] [address]        Goals        General    start date: 4/17/14 I will weigh myself daily and if any weight gain or shortness of breath I will call the clinic. (pt-stated)     Notes - Note created  4/17/2014  3:59 PM by Margareth Pichardo, RN    As of today's date 4/17/2014 goal is met at 0 - 25%.   Goal Status:  Active        Thank you!     Thank you for choosing [facility]  for your care. Our goal is always to provide you with excellent care. Hearing back from our patients is one way we can continue to improve our services. Please take a few minutes to complete the written survey that you may receive in the mail after your visit with us. Thank you!             Your Updated Medication List - Protect others around you: Learn how to safely use, store and throw away your medicines at www.disposemymeds.org.          This list is accurate as of: 4/25/17  2:39 PM.  Always use your most recent med list.                   Brand Name Dispense Instructions for use    ACCU-CHEK SMARTVIEW test strip   Generic drug:  blood glucose monitoring     100 each    1 strip by In Vitro route 2 times daily Or as directed       albuterol 108 (90 BASE)  MCG/ACT Inhaler    PROAIR HFA/PROVENTIL HFA/VENTOLIN HFA    1 Inhaler    Inhale 2 puffs into the lungs every 4 hours as needed for shortness of breath / dyspnea or wheezing       apixaban ANTICOAGULANT 5 MG tablet    ELIQUIS    180 tablet    Take 1 tablet (5 mg) by mouth 2 times daily       betamethasone valerate 0.1 % cream    VALISONE    15 g    Apply topically 2 times daily Use sparingly       blood glucose monitoring lancets     1 Box    USE TO TEST BLOOD SUGAR TWO TIMES A DAY OR AS DIRECTED       COMPOUND - PHARMACY TO MIX COMPOUNDED MEDICATION    CMPD RX    42 suppository    Vaginal valium- 10 mg suppository       fluocinolone 0.01 % solution    SYNALAR         * fluticasone 50 MCG/ACT spray    FLONASE     Spray 2 sprays into both nostrils daily       * fluticasone 50 MCG/ACT spray    FLONASE    16 mL    INHALE 1 TO 2 SPRAYS INTO BOTH NOSTRILS DAILY       furosemide 20 MG tablet    LASIX     Take 20 mg by mouth daily Repeat every aftenoon prn weight gain of 2 lbs and or 2+ edema lower extremities       glipiZIDE 5 MG tablet    GLUCOTROL    135 tablet    TAKE 1 TABLET BY MOUTH EVERY MORNING AND 0.5 TABLET EVERY EVENING       hydrocortisone 0.5 % cream      Apply topically 3 times daily as needed for itching (on arms) SEND HOME WITH PATIENT       * hydrocortisone 2.5 % cream    ANUSOL-HC    28 g    Place rectally 2 times daily as needed for hemorrhoids       * PROCTOSOL HC 2.5 % cream   Generic drug:  hydrocortisone     28.35 g    PLACE RECTALLY 2 TIMES DAILY AS NEEDED FOR HEMORRHOIDS       hydrOXYzine 25 MG capsule    VISTARIL    180 capsule    Take 1 capsule (25 mg) by mouth 2 times daily as needed for itching       lisinopril 5 MG tablet    PRINIVIL/ZESTRIL    90 tablet    TAKE 1 TABLET (5 MG) BY MOUTH DAILY       magnesium hydroxide 400 MG/5ML suspension    MILK OF MAGNESIA     Take 30 mLs by mouth At Bedtime       nystatin 214993 UNIT/GM Powd    MYCOSTATIN    60 g    Apply topically 2 times daily as needed        ondansetron 4 MG ODT tab    ZOFRAN ODT    30 tablet    Take 1 tablet (4 mg) by mouth every 6 hours as needed for nausea       order for DME     1 each    Equipment being ordered: AccuChek Smart View strips Patient tests twice daily.       PRESERVISION/LUTEIN Caps      Take 1 capsule by mouth 2 times daily       senna-docusate 8.6-50 MG per tablet    SENOKOT-S;PERICOLACE     Take 2 tablets by mouth 2 times daily       simvastatin 10 MG tablet    ZOCOR    90 tablet    Take 1 tablet (10 mg) by mouth At Bedtime       SPIRIVA HANDIHALER IN          traMADol 50 MG tablet    ULTRAM    100 tablet    TAKE 1 TABLET BY MOUTH EVERY 6 HOURS AS NEEDED FOR MODERATE PAIN       * Notice:  This list has 4 medication(s) that are the same as other medications prescribed for you. Read the directions carefully, and ask your doctor or other care provider to review them with you.

## 2017-04-25 NOTE — NURSING NOTE
"Chief Complaint   Patient presents with     Pre-Op Exam       Initial /70 (BP Location: Right arm, Patient Position: Chair, Cuff Size: Adult Regular)  Pulse 70  Temp 97.5  F (36.4  C) (Oral)  Ht 5' 5\" (1.651 m)  Wt 154 lb 1.6 oz (69.9 kg)  LMP  (LMP Unknown)  SpO2 100%  BMI 25.64 kg/m2 Estimated body mass index is 25.64 kg/(m^2) as calculated from the following:    Height as of this encounter: 5' 5\" (1.651 m).    Weight as of this encounter: 154 lb 1.6 oz (69.9 kg).  Medication Reconciliation: complete   Lucy Godfrey MA  "

## 2017-04-25 NOTE — PROGRESS NOTES
[address] [address]  [phone]  Dept: [phone]    PRE-OP EVALUATION:  Today's date: 2017    Helene Gibbs (: 1935) presents for pre-operative evaluation assessment as requested by , Duc Araya MD.  She requires evaluation and anesthesia risk assessment prior to undergoing surgery/procedure for treatment of cataract of right eye .  Proposed procedure:   PHACOEMULSIFICATION CLEAR CORNEA WITH Standard INTRAOCULAR LENS IMPLANT, Right  Date of Surgery/ Procedure: 2017  Time of Surgery/ Procedure: 9:00am  Hospital/Surgical Facility: [facility]    Primary Physician: Neisha Esparza  Type of Anesthesia Anticipated: General    Patient has a Health Care Directive or Living Will:  YES     1. NO - Do you have a history of heart attack, stroke, stent, bypass or surgery on an artery in the head, neck, heart or legs?  2. NO - Do you ever have any pain or discomfort in your chest?  3. NO - Do you have a history of  Heart Failure?  4. NO - Are you troubled by shortness of breath when: walking on the level, up a slight hill or at night?  5. NO - Do you currently have a cold, bronchitis or other respiratory infection?  6. NO - Do you have a cough, shortness of breath or wheezing?  7. NO - Do you sometimes get pains in the calves of your legs when you walk?  8. NO - Do you or anyone in your family have previous history of blood clots?  9. NO - Do you or does anyone in your family have a serious bleeding problem such as prolonged bleeding following surgeries or cuts?  10. yes - Have you ever had problems with anemia or been told to take iron pills?  11. no - Have you had any abnormal blood loss such as black, tarry or bloody stools, or abnormal vaginal bleeding?  12.yes - Have you ever had a blood transfusion?  13. NO - Have you or any of your relatives ever had problems with anesthesia?  14. NO - Do you have sleep apnea, excessive snoring or daytime  drowsiness?  15. NO - Do you have any prosthetic heart valves?  16. yes - Do you have prosthetic joints?  17. NO - Is there any chance that you may be pregnant?      HPI:                                                      Brief HPI related to upcoming procedure:     Patient is doing well and her conditions are under good control  Her cardiac health is improved, blood sugars are good, uses vaginal valium after pelvic floor test, toes are healing from ingrown toenail removal, and infection from dental work is healed  She does continue to struggle with constipation and neuropathy  She gets diarrhea and has a good bowel movement when she takes milk of magnesia  Miralax has not been effective for her in the past   Has a certification for medical marijuana use but wants to wait until after her cataract procedure to increase her dose as the lower dose that she tried did not help      See problem list for active medical problems.  Problems all longstanding and stable, except as noted/documented.  See ROS for pertinent symptoms related to these conditions.      MEDICAL HISTORY:                                                      Patient Active Problem List    Diagnosis Date Noted     Tubular adenoma 10/10/2016     Priority: Medium     Basal cell carcinoma of skin 09/27/2016     Priority: Medium     Controlled substance agreement signed 09/27/2016     Priority: Medium     Chronic pain syndrome 09/27/2016     Priority: Medium     Patient is followed by Neisha Esparza MD for ongoing prescription of pain medication.  All refills should be approved by this provider, or covering partner.    Medication(s): dilaudid.   Maximum quantity per month: # 60  Clinic visit frequency required: Q 3 months     Controlled substance agreement:  Encounter-Level CSA - 8/28/14:               Controlled Substance Agreement - Scan on 8/29/2014  8:53 AM : CONTROLLED MEDICATION AGREEMENT  8/28/14 (below)            Pain Clinic evaluation in the  past: No    DIRE Total Score(s):  No flowsheet data found.    Last [facility] website verification:  February 1, 2017     [email or web address]               Essential hypertension      Priority: Medium     Gastroesophageal reflux disease without esophagitis 01/19/2016     Priority: Medium     Slow transit constipation 01/15/2016     Priority: Medium     Cholecystitis 12/16/2015     Priority: Medium     Cholelithiasis with acute on chronic cholecystitis 12/14/2015     Priority: Medium     Nausea and vomiting 12/08/2015     Priority: Medium     Hyperlipidemia 01/13/2015     Priority: Medium     Problem list name updated by automated process. Provider to review       Mild cognitive impairment SLUMS = 22/ 30  CPT = 5.0/ 5.5 7-2014 07/28/2014     Priority: Medium     Neck pain 06/11/2014     Priority: Medium     CHF (congestive heart failure) (H) 05/09/2014     Priority: Medium     Permanent atrial fibrillation (H) 08/01/2008     Priority: Medium     S/P AV node ablation and pacemaker 10-25-12         Obesity 09/30/2014     Thoracic disc herniation 09/10/2014     Hypertension goal BP (blood pressure) < 140/90 08/12/2014     Depression with anxiety 08/12/2014     Constipation 08/12/2014     Tobacco abuse: [age]y/o on 8-12-14 @ 1ppd=50 pk yr hx  08/12/2014     Stasis dermatitis 08/12/2014     Health Care Home 07/15/2014     State Tier Level:  unknown  Status:  [facility]  Carrol Gibbs RN  [facility] Care Coordinator  [phone]                             Intractable back pain 07/11/2014     Cardiac pacemaker in situ 05/22/2014     COPD (chronic obstructive pulmonary disease) (H) 04/28/2014     Elevated TSH 04/28/2014     Diplopia 04/12/2014     Asymptomatic cholelithiasis 01/03/2014     Melanosis of colon 01/29/2013     Pulmonary hypertension (H)      c diff colitis 11-12-12 11/13/2012     Adjustment reaction with anxiety and depression 11/01/2012     Pleural effusion, left 10/01/2012     Chronic low  back pain 08/31/2012     Diabetes with proteinuria 11/09/2011     CKD (chronic kidney disease) stage 3, GFR 30-59 ml/min 08/10/2011     Anemia 06/07/2011     Type 2 diabetes mellitus with diabetic nephropathy (H) 12/07/2010      Past Medical History:   Diagnosis Date     Anemia      Ankle arthritis      Arthritis      Back pain      Bell's palsy 9/08    left     Breast CA (H) 2004-    left lumpectomy, radiation, -recurrence     Colon polyps     colonoscopy-9/12-next due in 9/13     Congestive heart failure (H)      COPD (chronic obstructive pulmonary disease) (H)      Coronary artery disease      DM (diabetes mellitus) (H)      GERD (gastroesophageal reflux disease)      Hyperlipidemia      Hypertension      Lumbar spinal stenosis     use cane     Obesity      Osteoporosis      Other chronic pain      Pacemaker      Permanent atrial fibrillation (H) 8/2008    S/P AV node ablation and pacemaker 10-25-12       Pleural effusion      Pulmonary hypertension (H)      Rectal stenosis      Tobacco abuse     current     Past Surgical History:   Procedure Laterality Date     ARTHROSCOPY SHOULDER RT/LT  1999, 2004    Bilateral, right then left     C DEXA, BONE DENSITY, AXIAL SKEL       C MAMMOGRAM, SCREENING  1/2009     COLONOSCOPY N/A 10/7/2016    Procedure: COMBINED COLONOSCOPY, SINGLE OR MULTIPLE BIOPSY/POLYPECTOMY BY BIOPSY;  Surgeon: Cassandra Mccord MD;  Location:  GI     H ABLATION AV NODE  10/2012     HC COLONOSCOPY THRU STOMA, DIAGNOSTIC  ? 2005     IMPLANT PACEMAKER  10/2012    [place] [id number], [id number]     JOINT REPLACEMTN, KNEE RT/LT      Joint Replacement knee bilateral     LAPAROSCOPIC CHOLECYSTECTOMY WITH CHOLANGIOGRAMS N/A 12/14/2015    Procedure: LAPAROSCOPIC CHOLECYSTECTOMY WITH CHOLANGIOGRAMS;  Surgeon: Ervin Amos MD;  Location: [facility] OR     LUMPECTOMY BREAST      Left-2004     PHACOEMULSIFICATION CLEAR CORNEA WITH STANDARD INTRAOCULAR LENS IMPLANT Left 7/16/2015    Procedure: PHACOEMULSIFICATION  CLEAR CORNEA WITH STANDARD INTRAOCULAR LENS IMPLANT;  Surgeon: Sai Obregon MD;  Location: [facility]     Current Outpatient Prescriptions   Medication Sig Dispense Refill     lisinopril (PRINIVIL/ZESTRIL) 5 MG tablet TAKE 1 TABLET (5 MG) BY MOUTH DAILY 90 tablet 1     fluticasone (FLONASE) 50 MCG/ACT spray INHALE 1 TO 2 SPRAYS INTO BOTH NOSTRILS DAILY 16 mL 3     hydrOXYzine (VISTARIL) 25 MG capsule TAKE 1 CAPSULE (25 MG) BY MOUTH 2 TIMES DAILY AS NEEDED FOR ITCHING 180 capsule 1     traMADol (ULTRAM) 50 MG tablet TAKE 1 TABLET BY MOUTH EVERY 6 HOURS AS NEEDED FOR MODERATE PAIN 100 tablet 1     ondansetron (ZOFRAN ODT) 4 MG ODT tab Take 1 tablet (4 mg) by mouth every 6 hours as needed for nausea 30 tablet 3     oxybutynin (DITROPAN XL) 5 MG 24 hr tablet Take 1 tablet (5 mg) by mouth daily (Patient not taking: Reported on 2/14/2017) 90 tablet 3     HYDROmorphone (DILAUDID) 2 MG tablet Take 1 tablet (2 mg) by mouth 2 times daily as needed for moderate to severe pain 60 tablet 0     glipiZIDE (GLUCOTROL) 5 MG tablet TAKE 1 TABLET BY MOUTH EVERY MORNING AND 0.5 TABLET EVERY EVENING 135 tablet 1     simvastatin (ZOCOR) 10 MG tablet Take 1 tablet (10 mg) by mouth At Bedtime 90 tablet 1     PROCTOSOL HC 2.5 % rectal cream PLACE RECTALLY 2 TIMES DAILY AS NEEDED FOR HEMORRHOIDS 28.35 g 5     COMPOUND (CMPD RX) - PHARMACY TO MIX COMPOUNDED MEDICATION Vaginal valium- 10 mg suppository 42 suppository 3     metroNIDAZOLE (FLAGYL) 500 MG tablet Take 1 tablet (500 mg) by mouth 2 times daily (Patient not taking: Reported on 2/14/2017) 20 tablet 0     Tiotropium Bromide Monohydrate (SPIRIVA HANDIHALER IN)        fluocinolone (SYNALAR) 0.01 % external solution   1     betamethasone valerate (VALISONE) 0.1 % cream Apply topically 2 times daily Use sparingly 15 g 1     hydrocortisone (ANUSOL-HC) 2.5 % rectal cream Place rectally 2 times daily as needed for hemorrhoids 28 g 1     lisinopril (PRINIVIL,ZESTRIL) 10 MG tablet TAKE 1  TABLET (10 MG) BY MOUTH DAILY 90 tablet 1     ACCU-CHEK SMARTVIEW test strip 1 strip by In Vitro route 2 times daily Or as directed 100 each 1     blood glucose monitoring (ACCU-CHEK FASTCLIX) lancets USE TO TEST BLOOD SUGAR TWO TIMES A DAY OR AS DIRECTED 1 Box 11     apixaban ANTICOAGULANT (ELIQUIS) 5 MG tablet Take 1 tablet (5 mg) by mouth 2 times daily 180 tablet 3     fluticasone (FLONASE) 50 MCG/ACT nasal spray Spray 2 sprays into both nostrils daily        furosemide (LASIX) 20 MG tablet Take 20 mg by mouth daily Repeat every aftenoon prn weight gain of 2 lbs and or 2+ edema lower extremities       Multiple Vitamins-Minerals (PRESERVISION/LUTEIN) CAPS Take 1 capsule by mouth 2 times daily       nystatin (MYCOSTATIN) 297601 UNIT/GM POWD Apply topically 2 times daily as needed 60 g 1     ORDER FOR DME, SET TO LOCAL PRINT, Equipment being ordered: AccuChek Smart View strips  Patient tests twice daily. 1 each 11     albuterol (PROAIR HFA, PROVENTIL HFA, VENTOLIN HFA) 108 (90 BASE) MCG/ACT inhaler Inhale 2 puffs into the lungs every 4 hours as needed for shortness of breath / dyspnea or wheezing 1 Inhaler 5     magnesium hydroxide (MILK OF MAGNESIA) 400 MG/5ML suspension Take 30 mLs by mouth At Bedtime        senna-docusate (SENOKOT-S;PERICOLACE) 8.6-50 MG per tablet Take 2 tablets by mouth 2 times daily       hydrocortisone 0.5 % cream Apply topically 3 times daily as needed for itching (on arms) SEND HOME WITH PATIENT       OTC products: None, except as noted above    Allergies   Allergen Reactions     Penicillins Hives     Ambien [Zolpidem Tartrate]      Hallucinations and fell out of bed at night.     Definity      Caused a syncopal episode.     Sulfa Drugs Itching     Cymbalta Rash     Fluconazole Rash      Latex Allergy: NO    Social History   Substance Use Topics     Smoking status: Current Some Day Smoker     Packs/day: 0.50     Years: 45.00     Types: Cigarettes     Smokeless tobacco: Never Used       "Comment: 01/25/15 6 or less cigarettes per day, intermittent     Alcohol use No     History   Drug Use No       REVIEW OF SYSTEMS:                                                    C: NEGATIVE for fever, chills, change in weight  E/M: NEGATIVE for ear, mouth and throat problems  R: NEGATIVE for significant cough or SOB  CV: NEGATIVE for chest pain, palpitations or peripheral edema  N: POSITIVE for neuropathy  Anesthesia Risk Assessment    No hx of anesthesia risk complications  No fhx of anesthesia complications       This document serves as a record of the services and decisions personally performed and made by Neisha Esparza MD. It was created on her behalf by Steve Savage, a trained medical scribe. The creation of this document is based on the provider's statements to the medical scribe.    Scrkourtney Savage 2:20 PM, April 25, 2017    EXAM:                                                    /70 (BP Location: Right arm, Patient Position: Chair, Cuff Size: Adult Regular)  Pulse 70  Temp 97.5  F (36.4  C) (Oral)  Ht 1.651 m (5' 5\")  Wt 69.9 kg (154 lb 1.6 oz)  LMP  (LMP Unknown)  SpO2 100%  BMI 25.64 kg/m2  GENERAL APPEARANCE: healthy, alert and no distress  HENT: ear canals and TM's normal and nose and mouth without ulcers or lesions  RESP: lungs clear to auscultation - no rales, rhonchi or wheezes  CV: regular rate and rhythm, normal S1 S2, no S3 or S4 and no murmur, click or rub   ABDOMEN: soft, nontender, no HSM or masses and bowel sounds normal  NEURO: Normal strength and tone, sensory exam grossly normal, mentation intact and speech normal    DIAGNOSTICS:                                                    None indicated    Recent Labs   Lab Test  10/28/16   1556  09/27/16   1356  09/26/16   1305   03/11/16   1315   12/08/15   1705  12/03/15   0838   HGB  14.8   --   15.7   < >  14.7   < >  12.9  12.8   PLT  187   --   188   < >  239   < >  202  174   INR   --    --    --    --    --    --   " 1.21*  1.21*   NA  135   --   135   --   136   < >  128*  137   POTASSIUM  4.0   --   4.3   < >  4.2   < >  4.0  4.0   CR  1.20*   --   1.04   < >  1.05*   < >  0.98  1.16*   A1C   --   6.2*   --    --   6.0   < >   --    --     < > = values in this interval not displayed.      Results for orders placed or performed in visit on 04/25/17   HEMOGLOBIN A1C   Result Value Ref Range    Hemoglobin A1C 6.4 (H) 4.3 - 6.0 %   CBC with platelets   Result Value Ref Range    WBC 7.0 4.0 - 11.0 10e9/L    RBC Count 3.97 3.8 - 5.2 10e12/L    Hemoglobin 11.8 11.7 - 15.7 g/dL    Hematocrit 36.9 35.0 - 47.0 %    MCV 93 78 - 100 fl    MCH 29.7 26.5 - 33.0 pg    MCHC 32.0 31.5 - 36.5 g/dL    RDW 14.1 10.0 - 15.0 %    Platelet Count 291 150 - 450 10e9/L   UA with Microscopic reflex to Culture   Result Value Ref Range    Color Urine Yellow     Appearance Urine Clear     Glucose Urine Negative NEG mg/dL    Bilirubin Urine Negative NEG    Ketones Urine Negative NEG mg/dL    Specific Gravity Urine 1.015 1.003 - 1.035    pH Urine 7.0 5.0 - 7.0 pH    Protein Albumin Urine 100 (A) NEG mg/dL    Urobilinogen Urine 0.2 0.2 - 1.0 EU/dL    Nitrite Urine Negative NEG    Blood Urine Negative NEG    Leukocyte Esterase Urine Negative NEG    Source Midstream Urine     WBC Urine O - 2 0 - 2 /HPF    RBC Urine O - 2 0 - 2 /HPF    Squamous Epithelial /LPF Urine Moderate (A) FEW /LPF    Bacteria Urine Few (A) NEG /HPF     *Note: Due to a large number of results and/or encounters for the requested time period, some results have not been displayed. A complete set of results can be found in Results Review.       IMPRESSION:                                                    Reason for surgery/procedure: Right eye cataract  Diagnosis/reason for consult: Preop evaluation     The proposed surgical procedure is considered LOW risk.    REVISED CARDIAC RISK INDEX  The patient has the following serious cardiovascular risks for perioperative complications such as (MI,  PE, VFib and 3  AV Block):  No serious cardiac risks  INTERPRETATION: 1 risks: Class II (low risk - 0.9% complication rate)    The patient has the following additional risks for perioperative complications:  No identified additional risks    Helene was seen today for pre-op exam.    Diagnoses and all orders for this visit:    Preop general physical exam  Pre operative exam was performed    Cataract  To be corrected by the procedure    Type 2 diabetes mellitus with diabetic nephropathy, without long-term current use of insulin (H)  Well controlled, will continue current plan and check A1C   Lab Results   Component Value Date    A1C 6.2 09/27/2016    A1C 6.0 03/11/2016    A1C 6.1 01/03/2016    A1C 5.6 12/15/2015    A1C 6.6 10/16/2015   -     HEMOGLOBIN A1C  -     Lipid panel reflex to direct LDL  -     Comprehensive metabolic panel  -     FOOT EXAM  -     TSH with free T4 reflex    Screening for diabetic peripheral neuropathy    Itching  Well controlled on this medication, she would like to continue   -     hydrOXYzine (VISTARIL) 25 MG capsule; Take 1 capsule (25 mg) by mouth 2 times daily as needed for itching    Anemia, unspecified type  -     CBC with platelets    Chronic constipation  Patient has problems with chronic constipation that she has not been able to resolve  Miralax has not worked, milk of magnesia gives her diarrhea, has visited a pelvic   She reports that gabapentin and Lyrica do not affect her constipation  She is planning to go to the [facility] for further evaluation as she has not been able find an effective treatment    Medication management  -     Drug Abuse Screen Panel 13, Urine (Pain Care Package)    Urinary frequency  -     UA with Microscopic reflex to Culture    Neuropathy (H)  Per patient request, she wants to try taking a higher dose of gabapentin for her neuropathy  She reports that this does not affect her constipation  She has a prescription for medical marijuana, but  did not see results when on the low dose  Per patient she was approved for medical marijuana by another provider  She will try a higher dose of medical marijuana after her cataract procedure  -     gabapentin (NEURONTIN) 300 MG capsule; Take 1 capsule (300 mg) by mouth 2 times daily    Other orders  -     Cancel: NOVU Referral Smoking Cessation  -     Cancel: FOOT EXAM  NO CHARGE [[id number]]  -     Cancel: BASIC METABOLIC PANEL      RECOMMENDATIONS:                                                      Hold lisinopril , furosemide, glipizide and eliquis on morning of surgery   Resume after surgery     APPROVAL GIVEN to proceed with proposed procedure, without further diagnostic evaluation     The total visit time was 40 minutes more  than 50% was spent in counseling and coordination of care as discussed above.    Follow up as needed  Patient was advised to seek sooner medical attention if there is any new or worsening symptoms    The information in this document, created by the medical scribe for me, accurately reflects the services I personally performed and the decisions made by me. I have reviewed and approved this document for accuracy prior to leaving the patient care area.  Neisha Esparza MD  2:28 PM, 04/25/17    Signed Electronically by: Neisha Esparza MD    Copy of this evaluation report is provided to requesting physician.    [place] Preop Guidelines

## 2017-04-25 NOTE — LETTER
[facility]  [address] [facility]  [address]  [place]  [address]  Tel: [phone]    April 26, 2017    Helene Gibbs  [address]   [facility] [address]        Dear Ms. Gibbs,    Urine drug screen is satisfactory .  Urine test is negative for infection.  Your total cholesterol is normal.  HDL which is called good cholesterol is normal.  Your LDL cholesterol is normal.  This is often call bad cholesterol and high levels increase the risk for heart attacks and strokes.  Your triglycerides are normal.  Kidney function is stable  Liver enzymes are normal.  TSH which is thyroid hormone is normal.  CBC result which includes Hemoglobin and  Platelet Counts is satisfactory.  HbA1c which is average glucose during last 3 months is 6.4%.    If you have any further questions feel free to call our clinic        Sincerely,    Neisha Esparza MD          Enclosure: Lab Results

## 2017-04-26 LAB
ALBUMIN SERPL-MCNC: 3.9 G/DL (ref 3.4–5)
ALP SERPL-CCNC: 57 U/L (ref 40–150)
ALT SERPL W P-5'-P-CCNC: 15 U/L (ref 0–50)
AMPHETAMINES UR QL: ABNORMAL NG/ML
ANION GAP SERPL CALCULATED.3IONS-SCNC: 10 MMOL/L (ref 3–14)
AST SERPL W P-5'-P-CCNC: 13 U/L (ref 0–45)
BARBITURATES UR QL SCN: ABNORMAL NG/ML
BENZODIAZ UR QL SCN: ABNORMAL NG/ML
BILIRUB SERPL-MCNC: 0.5 MG/DL (ref 0.2–1.3)
BUN SERPL-MCNC: 17 MG/DL (ref 7–30)
BUPRENORPHINE UR QL: ABNORMAL NG/ML
CALCIUM SERPL-MCNC: 10.1 MG/DL (ref 8.5–10.1)
CANNABINOIDS UR QL: ABNORMAL NG/ML
CHLORIDE SERPL-SCNC: 101 MMOL/L (ref 94–109)
CHOLEST SERPL-MCNC: 132 MG/DL
CO2 SERPL-SCNC: 24 MMOL/L (ref 20–32)
COCAINE UR QL SCN: ABNORMAL NG/ML
CREAT SERPL-MCNC: 1.2 MG/DL (ref 0.52–1.04)
D-METHAMPHET UR QL: ABNORMAL NG/ML
GFR SERPL CREATININE-BSD FRML MDRD: 43 ML/MIN/1.7M2
GLUCOSE SERPL-MCNC: 99 MG/DL (ref 70–99)
HDLC SERPL-MCNC: 75 MG/DL
LDLC SERPL CALC-MCNC: 39 MG/DL
METHADONE UR QL SCN: ABNORMAL NG/ML
NONHDLC SERPL-MCNC: 57 MG/DL
OPIATES UR QL SCN: ABNORMAL NG/ML
OXYCODONE UR QL SCN: ABNORMAL NG/ML
PCP UR QL SCN: ABNORMAL NG/ML
POTASSIUM SERPL-SCNC: 5 MMOL/L (ref 3.4–5.3)
PROPOXYPH UR QL: ABNORMAL NG/ML
PROT SERPL-MCNC: 7.5 G/DL (ref 6.8–8.8)
SODIUM SERPL-SCNC: 135 MMOL/L (ref 133–144)
TRICYCLICS UR QL SCN: ABNORMAL NG/ML
TRIGL SERPL-MCNC: 91 MG/DL
TSH SERPL DL<=0.005 MIU/L-ACNC: 1.56 MU/L (ref 0.4–4)

## 2017-04-26 ASSESSMENT — ANXIETY QUESTIONNAIRES: GAD7 TOTAL SCORE: 3

## 2017-04-26 ASSESSMENT — PATIENT HEALTH QUESTIONNAIRE - PHQ9: SUM OF ALL RESPONSES TO PHQ QUESTIONS 1-9: 2

## 2017-04-26 NOTE — PROGRESS NOTES
Please Notify the patient  Urine drug screen is satisfactory .  Urine test is negative for infection.  Your total cholesterol is normal.  HDL which is called good cholesterol is normal.  Your LDL cholesterol is normal.  This is often call bad cholesterol and high levels increase the risk for heart attacks and strokes.  Your triglycerides are normal.  Kidney function is stable  Liver enzymes are normal.  TSH which is thyroid hormone is normal.  CBC result which includes Hemoglobin and  Platelet Counts is satisfactory.  HbA1c which is average glucose during last 3 months is 6.4%.  Lab Results       Component                Value               Date                       A1C                      6.4                 04/25/2017                 A1C                      6.2                 09/27/2016                 A1C                      6.0                 03/11/2016                 A1C                      6.1                 01/03/2016                 A1C                      5.6                 12/15/2015            Please send the copy of lab results also to this patient.

## 2017-05-08 NOTE — H&P (VIEW-ONLY)
[address] [address]  [phone]  Dept: [phone]    PRE-OP EVALUATION:  Today's date: 2017    Helene Gibbs (: 1935) presents for pre-operative evaluation assessment as requested by , Duc Araya MD.  She requires evaluation and anesthesia risk assessment prior to undergoing surgery/procedure for treatment of cataract of right eye .  Proposed procedure:   PHACOEMULSIFICATION CLEAR CORNEA WITH Standard INTRAOCULAR LENS IMPLANT, Right  Date of Surgery/ Procedure: 2017  Time of Surgery/ Procedure: 9:00am  Hospital/Surgical Facility: [facility]    Primary Physician: Neisha Esparza  Type of Anesthesia Anticipated: General    Patient has a Health Care Directive or Living Will:  YES     1. NO - Do you have a history of heart attack, stroke, stent, bypass or surgery on an artery in the head, neck, heart or legs?  2. NO - Do you ever have any pain or discomfort in your chest?  3. NO - Do you have a history of  Heart Failure?  4. NO - Are you troubled by shortness of breath when: walking on the level, up a slight hill or at night?  5. NO - Do you currently have a cold, bronchitis or other respiratory infection?  6. NO - Do you have a cough, shortness of breath or wheezing?  7. NO - Do you sometimes get pains in the calves of your legs when you walk?  8. NO - Do you or anyone in your family have previous history of blood clots?  9. NO - Do you or does anyone in your family have a serious bleeding problem such as prolonged bleeding following surgeries or cuts?  10. yes - Have you ever had problems with anemia or been told to take iron pills?  11. no - Have you had any abnormal blood loss such as black, tarry or bloody stools, or abnormal vaginal bleeding?  12.yes - Have you ever had a blood transfusion?  13. NO - Have you or any of your relatives ever had problems with anesthesia?  14. NO - Do you have sleep apnea, excessive snoring or daytime  drowsiness?  15. NO - Do you have any prosthetic heart valves?  16. yes - Do you have prosthetic joints?  17. NO - Is there any chance that you may be pregnant?      HPI:                                                      Brief HPI related to upcoming procedure:     Patient is doing well and her conditions are under good control  Her cardiac health is improved, blood sugars are good, uses vaginal valium after pelvic floor test, toes are healing from ingrown toenail removal, and infection from dental work is healed  She does continue to struggle with constipation and neuropathy  She gets diarrhea and has a good bowel movement when she takes milk of magnesia  Miralax has not been effective for her in the past   Has a certification for medical marijuana use but wants to wait until after her cataract procedure to increase her dose as the lower dose that she tried did not help      See problem list for active medical problems.  Problems all longstanding and stable, except as noted/documented.  See ROS for pertinent symptoms related to these conditions.      MEDICAL HISTORY:                                                      Patient Active Problem List    Diagnosis Date Noted     Tubular adenoma 10/10/2016     Priority: Medium     Basal cell carcinoma of skin 09/27/2016     Priority: Medium     Controlled substance agreement signed 09/27/2016     Priority: Medium     Chronic pain syndrome 09/27/2016     Priority: Medium     Patient is followed by Neisha Esparaz MD for ongoing prescription of pain medication.  All refills should be approved by this provider, or covering partner.    Medication(s): dilaudid.   Maximum quantity per month: # 60  Clinic visit frequency required: Q 3 months     Controlled substance agreement:  Encounter-Level CSA - 8/28/14:               Controlled Substance Agreement - Scan on 8/29/2014  8:53 AM : CONTROLLED MEDICATION AGREEMENT  8/28/14 (below)            Pain Clinic evaluation in the  past: No    DIRE Total Score(s):  No flowsheet data found.    Last [facility] website verification:  February 1, 2017     [email or web address]               Essential hypertension      Priority: Medium     Gastroesophageal reflux disease without esophagitis 01/19/2016     Priority: Medium     Slow transit constipation 01/15/2016     Priority: Medium     Cholecystitis 12/16/2015     Priority: Medium     Cholelithiasis with acute on chronic cholecystitis 12/14/2015     Priority: Medium     Nausea and vomiting 12/08/2015     Priority: Medium     Hyperlipidemia 01/13/2015     Priority: Medium     Problem list name updated by automated process. Provider to review       Mild cognitive impairment SLUMS = 22/ 30  CPT = 5.0/ 5.5 7-2014 07/28/2014     Priority: Medium     Neck pain 06/11/2014     Priority: Medium     CHF (congestive heart failure) (H) 05/09/2014     Priority: Medium     Permanent atrial fibrillation (H) 08/01/2008     Priority: Medium     S/P AV node ablation and pacemaker 10-25-12         Obesity 09/30/2014     Thoracic disc herniation 09/10/2014     Hypertension goal BP (blood pressure) < 140/90 08/12/2014     Depression with anxiety 08/12/2014     Constipation 08/12/2014     Tobacco abuse: [age]y/o on 8-12-14 @ 1ppd=50 pk yr hx  08/12/2014     Stasis dermatitis 08/12/2014     Health Care Home 07/15/2014     State Tier Level:  unknown  Status:  [facility]  Carrol Gibbs RN  [facility] Care Coordinator  [phone]                             Intractable back pain 07/11/2014     Cardiac pacemaker in situ 05/22/2014     COPD (chronic obstructive pulmonary disease) (H) 04/28/2014     Elevated TSH 04/28/2014     Diplopia 04/12/2014     Asymptomatic cholelithiasis 01/03/2014     Melanosis of colon 01/29/2013     Pulmonary hypertension (H)      c diff colitis 11-12-12 11/13/2012     Adjustment reaction with anxiety and depression 11/01/2012     Pleural effusion, left 10/01/2012     Chronic low  back pain 08/31/2012     Diabetes with proteinuria 11/09/2011     CKD (chronic kidney disease) stage 3, GFR 30-59 ml/min 08/10/2011     Anemia 06/07/2011     Type 2 diabetes mellitus with diabetic nephropathy (H) 12/07/2010      Past Medical History:   Diagnosis Date     Anemia      Ankle arthritis      Arthritis      Back pain      Bell's palsy 9/08    left     Breast CA (H) 2004-    left lumpectomy, radiation, -recurrence     Colon polyps     colonoscopy-9/12-next due in 9/13     Congestive heart failure (H)      COPD (chronic obstructive pulmonary disease) (H)      Coronary artery disease      DM (diabetes mellitus) (H)      GERD (gastroesophageal reflux disease)      Hyperlipidemia      Hypertension      Lumbar spinal stenosis     use cane     Obesity      Osteoporosis      Other chronic pain      Pacemaker      Permanent atrial fibrillation (H) 8/2008    S/P AV node ablation and pacemaker 10-25-12       Pleural effusion      Pulmonary hypertension (H)      Rectal stenosis      Tobacco abuse     current     Past Surgical History:   Procedure Laterality Date     ARTHROSCOPY SHOULDER RT/LT  1999, 2004    Bilateral, right then left     C DEXA, BONE DENSITY, AXIAL SKEL       C MAMMOGRAM, SCREENING  1/2009     COLONOSCOPY N/A 10/7/2016    Procedure: COMBINED COLONOSCOPY, SINGLE OR MULTIPLE BIOPSY/POLYPECTOMY BY BIOPSY;  Surgeon: Cassandra Mccord MD;  Location:  GI     H ABLATION AV NODE  10/2012     HC COLONOSCOPY THRU STOMA, DIAGNOSTIC  ? 2005     IMPLANT PACEMAKER  10/2012    [place] [id number], [id number]     JOINT REPLACEMTN, KNEE RT/LT      Joint Replacement knee bilateral     LAPAROSCOPIC CHOLECYSTECTOMY WITH CHOLANGIOGRAMS N/A 12/14/2015    Procedure: LAPAROSCOPIC CHOLECYSTECTOMY WITH CHOLANGIOGRAMS;  Surgeon: Ervin Amos MD;  Location: [facility] OR     LUMPECTOMY BREAST      Left-2004     PHACOEMULSIFICATION CLEAR CORNEA WITH STANDARD INTRAOCULAR LENS IMPLANT Left 7/16/2015    Procedure: PHACOEMULSIFICATION  CLEAR CORNEA WITH STANDARD INTRAOCULAR LENS IMPLANT;  Surgeon: Sai Obregon MD;  Location: [facility]     Current Outpatient Prescriptions   Medication Sig Dispense Refill     lisinopril (PRINIVIL/ZESTRIL) 5 MG tablet TAKE 1 TABLET (5 MG) BY MOUTH DAILY 90 tablet 1     fluticasone (FLONASE) 50 MCG/ACT spray INHALE 1 TO 2 SPRAYS INTO BOTH NOSTRILS DAILY 16 mL 3     hydrOXYzine (VISTARIL) 25 MG capsule TAKE 1 CAPSULE (25 MG) BY MOUTH 2 TIMES DAILY AS NEEDED FOR ITCHING 180 capsule 1     traMADol (ULTRAM) 50 MG tablet TAKE 1 TABLET BY MOUTH EVERY 6 HOURS AS NEEDED FOR MODERATE PAIN 100 tablet 1     ondansetron (ZOFRAN ODT) 4 MG ODT tab Take 1 tablet (4 mg) by mouth every 6 hours as needed for nausea 30 tablet 3     oxybutynin (DITROPAN XL) 5 MG 24 hr tablet Take 1 tablet (5 mg) by mouth daily (Patient not taking: Reported on 2/14/2017) 90 tablet 3     HYDROmorphone (DILAUDID) 2 MG tablet Take 1 tablet (2 mg) by mouth 2 times daily as needed for moderate to severe pain 60 tablet 0     glipiZIDE (GLUCOTROL) 5 MG tablet TAKE 1 TABLET BY MOUTH EVERY MORNING AND 0.5 TABLET EVERY EVENING 135 tablet 1     simvastatin (ZOCOR) 10 MG tablet Take 1 tablet (10 mg) by mouth At Bedtime 90 tablet 1     PROCTOSOL HC 2.5 % rectal cream PLACE RECTALLY 2 TIMES DAILY AS NEEDED FOR HEMORRHOIDS 28.35 g 5     COMPOUND (CMPD RX) - PHARMACY TO MIX COMPOUNDED MEDICATION Vaginal valium- 10 mg suppository 42 suppository 3     metroNIDAZOLE (FLAGYL) 500 MG tablet Take 1 tablet (500 mg) by mouth 2 times daily (Patient not taking: Reported on 2/14/2017) 20 tablet 0     Tiotropium Bromide Monohydrate (SPIRIVA HANDIHALER IN)        fluocinolone (SYNALAR) 0.01 % external solution   1     betamethasone valerate (VALISONE) 0.1 % cream Apply topically 2 times daily Use sparingly 15 g 1     hydrocortisone (ANUSOL-HC) 2.5 % rectal cream Place rectally 2 times daily as needed for hemorrhoids 28 g 1     lisinopril (PRINIVIL,ZESTRIL) 10 MG tablet TAKE 1  TABLET (10 MG) BY MOUTH DAILY 90 tablet 1     ACCU-CHEK SMARTVIEW test strip 1 strip by In Vitro route 2 times daily Or as directed 100 each 1     blood glucose monitoring (ACCU-CHEK FASTCLIX) lancets USE TO TEST BLOOD SUGAR TWO TIMES A DAY OR AS DIRECTED 1 Box 11     apixaban ANTICOAGULANT (ELIQUIS) 5 MG tablet Take 1 tablet (5 mg) by mouth 2 times daily 180 tablet 3     fluticasone (FLONASE) 50 MCG/ACT nasal spray Spray 2 sprays into both nostrils daily        furosemide (LASIX) 20 MG tablet Take 20 mg by mouth daily Repeat every aftenoon prn weight gain of 2 lbs and or 2+ edema lower extremities       Multiple Vitamins-Minerals (PRESERVISION/LUTEIN) CAPS Take 1 capsule by mouth 2 times daily       nystatin (MYCOSTATIN) 836701 UNIT/GM POWD Apply topically 2 times daily as needed 60 g 1     ORDER FOR DME, SET TO LOCAL PRINT, Equipment being ordered: AccuChek Smart View strips  Patient tests twice daily. 1 each 11     albuterol (PROAIR HFA, PROVENTIL HFA, VENTOLIN HFA) 108 (90 BASE) MCG/ACT inhaler Inhale 2 puffs into the lungs every 4 hours as needed for shortness of breath / dyspnea or wheezing 1 Inhaler 5     magnesium hydroxide (MILK OF MAGNESIA) 400 MG/5ML suspension Take 30 mLs by mouth At Bedtime        senna-docusate (SENOKOT-S;PERICOLACE) 8.6-50 MG per tablet Take 2 tablets by mouth 2 times daily       hydrocortisone 0.5 % cream Apply topically 3 times daily as needed for itching (on arms) SEND HOME WITH PATIENT       OTC products: None, except as noted above    Allergies   Allergen Reactions     Penicillins Hives     Ambien [Zolpidem Tartrate]      Hallucinations and fell out of bed at night.     Definity      Caused a syncopal episode.     Sulfa Drugs Itching     Cymbalta Rash     Fluconazole Rash      Latex Allergy: NO    Social History   Substance Use Topics     Smoking status: Current Some Day Smoker     Packs/day: 0.50     Years: 45.00     Types: Cigarettes     Smokeless tobacco: Never Used       "Comment: 01/25/15 6 or less cigarettes per day, intermittent     Alcohol use No     History   Drug Use No       REVIEW OF SYSTEMS:                                                    C: NEGATIVE for fever, chills, change in weight  E/M: NEGATIVE for ear, mouth and throat problems  R: NEGATIVE for significant cough or SOB  CV: NEGATIVE for chest pain, palpitations or peripheral edema  N: POSITIVE for neuropathy  Anesthesia Risk Assessment    No hx of anesthesia risk complications  No fhx of anesthesia complications       This document serves as a record of the services and decisions personally performed and made by Neisha Esparza MD. It was created on her behalf by Steve Savage, a trained medical scribe. The creation of this document is based on the provider's statements to the medical scribe.    Scrkourtney Savage 2:20 PM, April 25, 2017    EXAM:                                                    /70 (BP Location: Right arm, Patient Position: Chair, Cuff Size: Adult Regular)  Pulse 70  Temp 97.5  F (36.4  C) (Oral)  Ht 1.651 m (5' 5\")  Wt 69.9 kg (154 lb 1.6 oz)  LMP  (LMP Unknown)  SpO2 100%  BMI 25.64 kg/m2  GENERAL APPEARANCE: healthy, alert and no distress  HENT: ear canals and TM's normal and nose and mouth without ulcers or lesions  RESP: lungs clear to auscultation - no rales, rhonchi or wheezes  CV: regular rate and rhythm, normal S1 S2, no S3 or S4 and no murmur, click or rub   ABDOMEN: soft, nontender, no HSM or masses and bowel sounds normal  NEURO: Normal strength and tone, sensory exam grossly normal, mentation intact and speech normal    DIAGNOSTICS:                                                    None indicated    Recent Labs   Lab Test  10/28/16   1556  09/27/16   1356  09/26/16   1305   03/11/16   1315   12/08/15   1705  12/03/15   0838   HGB  14.8   --   15.7   < >  14.7   < >  12.9  12.8   PLT  187   --   188   < >  239   < >  202  174   INR   --    --    --    --    --    --   " 1.21*  1.21*   NA  135   --   135   --   136   < >  128*  137   POTASSIUM  4.0   --   4.3   < >  4.2   < >  4.0  4.0   CR  1.20*   --   1.04   < >  1.05*   < >  0.98  1.16*   A1C   --   6.2*   --    --   6.0   < >   --    --     < > = values in this interval not displayed.      Results for orders placed or performed in visit on 04/25/17   HEMOGLOBIN A1C   Result Value Ref Range    Hemoglobin A1C 6.4 (H) 4.3 - 6.0 %   CBC with platelets   Result Value Ref Range    WBC 7.0 4.0 - 11.0 10e9/L    RBC Count 3.97 3.8 - 5.2 10e12/L    Hemoglobin 11.8 11.7 - 15.7 g/dL    Hematocrit 36.9 35.0 - 47.0 %    MCV 93 78 - 100 fl    MCH 29.7 26.5 - 33.0 pg    MCHC 32.0 31.5 - 36.5 g/dL    RDW 14.1 10.0 - 15.0 %    Platelet Count 291 150 - 450 10e9/L   UA with Microscopic reflex to Culture   Result Value Ref Range    Color Urine Yellow     Appearance Urine Clear     Glucose Urine Negative NEG mg/dL    Bilirubin Urine Negative NEG    Ketones Urine Negative NEG mg/dL    Specific Gravity Urine 1.015 1.003 - 1.035    pH Urine 7.0 5.0 - 7.0 pH    Protein Albumin Urine 100 (A) NEG mg/dL    Urobilinogen Urine 0.2 0.2 - 1.0 EU/dL    Nitrite Urine Negative NEG    Blood Urine Negative NEG    Leukocyte Esterase Urine Negative NEG    Source Midstream Urine     WBC Urine O - 2 0 - 2 /HPF    RBC Urine O - 2 0 - 2 /HPF    Squamous Epithelial /LPF Urine Moderate (A) FEW /LPF    Bacteria Urine Few (A) NEG /HPF     *Note: Due to a large number of results and/or encounters for the requested time period, some results have not been displayed. A complete set of results can be found in Results Review.       IMPRESSION:                                                    Reason for surgery/procedure: Right eye cataract  Diagnosis/reason for consult: Preop evaluation     The proposed surgical procedure is considered LOW risk.    REVISED CARDIAC RISK INDEX  The patient has the following serious cardiovascular risks for perioperative complications such as (MI,  PE, VFib and 3  AV Block):  No serious cardiac risks  INTERPRETATION: 1 risks: Class II (low risk - 0.9% complication rate)    The patient has the following additional risks for perioperative complications:  No identified additional risks    Helene was seen today for pre-op exam.    Diagnoses and all orders for this visit:    Preop general physical exam  Pre operative exam was performed    Cataract  To be corrected by the procedure    Type 2 diabetes mellitus with diabetic nephropathy, without long-term current use of insulin (H)  Well controlled, will continue current plan and check A1C   Lab Results   Component Value Date    A1C 6.2 09/27/2016    A1C 6.0 03/11/2016    A1C 6.1 01/03/2016    A1C 5.6 12/15/2015    A1C 6.6 10/16/2015   -     HEMOGLOBIN A1C  -     Lipid panel reflex to direct LDL  -     Comprehensive metabolic panel  -     FOOT EXAM  -     TSH with free T4 reflex    Screening for diabetic peripheral neuropathy    Itching  Well controlled on this medication, she would like to continue   -     hydrOXYzine (VISTARIL) 25 MG capsule; Take 1 capsule (25 mg) by mouth 2 times daily as needed for itching    Anemia, unspecified type  -     CBC with platelets    Chronic constipation  Patient has problems with chronic constipation that she has not been able to resolve  Miralax has not worked, milk of magnesia gives her diarrhea, has visited a pelvic   She reports that gabapentin and Lyrica do not affect her constipation  She is planning to go to the [facility] for further evaluation as she has not been able find an effective treatment    Medication management  -     Drug Abuse Screen Panel 13, Urine (Pain Care Package)    Urinary frequency  -     UA with Microscopic reflex to Culture    Neuropathy (H)  Per patient request, she wants to try taking a higher dose of gabapentin for her neuropathy  She reports that this does not affect her constipation  She has a prescription for medical marijuana, but  did not see results when on the low dose  Per patient she was approved for medical marijuana by another provider  She will try a higher dose of medical marijuana after her cataract procedure  -     gabapentin (NEURONTIN) 300 MG capsule; Take 1 capsule (300 mg) by mouth 2 times daily    Other orders  -     Cancel: NOVU Referral Smoking Cessation  -     Cancel: FOOT EXAM  NO CHARGE [[id number]]  -     Cancel: BASIC METABOLIC PANEL      RECOMMENDATIONS:                                                      Hold lisinopril , furosemide, glipizide and eliquis on morning of surgery   Resume after surgery     APPROVAL GIVEN to proceed with proposed procedure, without further diagnostic evaluation     The total visit time was 40 minutes more  than 50% was spent in counseling and coordination of care as discussed above.    Follow up as needed  Patient was advised to seek sooner medical attention if there is any new or worsening symptoms    The information in this document, created by the medical scribe for me, accurately reflects the services I personally performed and the decisions made by me. I have reviewed and approved this document for accuracy prior to leaving the patient care area.  Neisha Esparza MD  2:28 PM, 04/25/17    Signed Electronically by: Neisha Esparza MD    Copy of this evaluation report is provided to requesting physician.    [place] Preop Guidelines

## 2017-05-09 ENCOUNTER — HOSPITAL ENCOUNTER (OUTPATIENT)
Facility: CLINIC | Age: 82
Discharge: HOME | End: 2017-05-09
Attending: OPHTHALMOLOGY | Admitting: OPHTHALMOLOGY
Payer: MEDICARE

## 2017-05-09 ENCOUNTER — APPOINTMENT (OUTPATIENT)
Dept: ADMISSION | Facility: CLINIC | Age: 82
End: 2017-05-09
Attending: OPHTHALMOLOGY
Payer: COMMERCIAL

## 2017-05-09 ENCOUNTER — ANESTHESIA (OUTPATIENT)
Dept: SURGERY | Facility: CLINIC | Age: 82
End: 2017-05-09
Payer: MEDICARE

## 2017-05-09 ENCOUNTER — ANESTHESIA EVENT (OUTPATIENT)
Dept: SURGERY | Facility: CLINIC | Age: 82
End: 2017-05-09
Payer: MEDICARE

## 2017-05-09 VITALS
RESPIRATION RATE: 16 BRPM | SYSTOLIC BLOOD PRESSURE: 130 MMHG | TEMPERATURE: 98.1 F | DIASTOLIC BLOOD PRESSURE: 68 MMHG | OXYGEN SATURATION: 98 %

## 2017-05-09 LAB
GLUCOSE BLDC GLUCOMTR-MCNC: 120 MG/DL (ref 70–99)
GLUCOSE BLDC GLUCOMTR-MCNC: 157 MG/DL (ref 70–99)

## 2017-05-09 PROCEDURE — 36000101 ZZH EYE SURGERY LEVEL 3 1ST 30 MIN: Performed by: OPHTHALMOLOGY

## 2017-05-09 PROCEDURE — 36000102 ZZH EYE SURGERY LEVEL 3 EA 15 ADDTL MIN: Performed by: OPHTHALMOLOGY

## 2017-05-09 PROCEDURE — V2632 POST CHMBR INTRAOCULAR LENS: HCPCS | Performed by: OPHTHALMOLOGY

## 2017-05-09 PROCEDURE — 25000125 ZZHC RX 250: Performed by: ANESTHESIOLOGY

## 2017-05-09 PROCEDURE — 25000566 ZZH SEVOFLURANE, EA 15 MIN: Performed by: OPHTHALMOLOGY

## 2017-05-09 PROCEDURE — 71000028 ZZH EYE RECOVERY PHASE 2 EACH 15 MINS: Performed by: OPHTHALMOLOGY

## 2017-05-09 PROCEDURE — 25000128 H RX IP 250 OP 636: Performed by: NURSE ANESTHETIST, CERTIFIED REGISTERED

## 2017-05-09 PROCEDURE — 37000009 ZZH ANESTHESIA TECHNICAL FEE, EACH ADDTL 15 MIN: Performed by: OPHTHALMOLOGY

## 2017-05-09 PROCEDURE — 25000128 H RX IP 250 OP 636: Performed by: OPHTHALMOLOGY

## 2017-05-09 PROCEDURE — 25800025 ZZH RX 258: Performed by: ANESTHESIOLOGY

## 2017-05-09 PROCEDURE — 71000004 ZZH RECOVERY EYE PHASE 1 LEVEL 1 FIRST HR: Performed by: OPHTHALMOLOGY

## 2017-05-09 PROCEDURE — V2787 ASTIGMATISM-CORRECT FUNCTION: HCPCS | Performed by: OPHTHALMOLOGY

## 2017-05-09 PROCEDURE — 82962 GLUCOSE BLOOD TEST: CPT

## 2017-05-09 PROCEDURE — 25000125 ZZHC RX 250: Performed by: NURSE ANESTHETIST, CERTIFIED REGISTERED

## 2017-05-09 PROCEDURE — 25000125 ZZHC RX 250: Performed by: OPHTHALMOLOGY

## 2017-05-09 PROCEDURE — 40000170 ZZH STATISTIC PRE-PROCEDURE ASSESSMENT II: Performed by: OPHTHALMOLOGY

## 2017-05-09 PROCEDURE — 37000008 ZZH ANESTHESIA TECHNICAL FEE, 1ST 30 MIN: Performed by: OPHTHALMOLOGY

## 2017-05-09 PROCEDURE — 27210794 ZZH OR GENERAL SUPPLY STERILE: Performed by: OPHTHALMOLOGY

## 2017-05-09 DEVICE — IMPLANTABLE DEVICE: Type: IMPLANTABLE DEVICE | Site: EYE | Status: FUNCTIONAL

## 2017-05-09 RX ORDER — ALBUTEROL SULFATE 0.83 MG/ML
2.5 SOLUTION RESPIRATORY (INHALATION) EVERY 4 HOURS PRN
Status: DISCONTINUED | OUTPATIENT
Start: 2017-05-09 | End: 2017-05-09 | Stop reason: HOSPADM

## 2017-05-09 RX ORDER — MEPERIDINE HYDROCHLORIDE 25 MG/ML
12.5 INJECTION INTRAMUSCULAR; INTRAVENOUS; SUBCUTANEOUS
Status: DISCONTINUED | OUTPATIENT
Start: 2017-05-09 | End: 2017-05-09 | Stop reason: HOSPADM

## 2017-05-09 RX ORDER — LIDOCAINE HYDROCHLORIDE 20 MG/ML
INJECTION, SOLUTION INFILTRATION; PERINEURAL PRN
Status: DISCONTINUED | OUTPATIENT
Start: 2017-05-09 | End: 2017-05-09

## 2017-05-09 RX ORDER — ACETAMINOPHEN 650 MG/1
650 SUPPOSITORY RECTAL EVERY 4 HOURS PRN
Status: DISCONTINUED | OUTPATIENT
Start: 2017-05-09 | End: 2017-05-09 | Stop reason: HOSPADM

## 2017-05-09 RX ORDER — LIDOCAINE HYDROCHLORIDE 10 MG/ML
INJECTION, SOLUTION EPIDURAL; INFILTRATION; INTRACAUDAL; PERINEURAL PRN
Status: DISCONTINUED | OUTPATIENT
Start: 2017-05-09 | End: 2017-05-09 | Stop reason: HOSPADM

## 2017-05-09 RX ORDER — PHENYLEPHRINE HYDROCHLORIDE 25 MG/ML
1 SOLUTION/ DROPS OPHTHALMIC
Status: COMPLETED | OUTPATIENT
Start: 2017-05-09 | End: 2017-05-09

## 2017-05-09 RX ORDER — NALOXONE HYDROCHLORIDE 0.4 MG/ML
.1-.4 INJECTION, SOLUTION INTRAMUSCULAR; INTRAVENOUS; SUBCUTANEOUS
Status: DISCONTINUED | OUTPATIENT
Start: 2017-05-09 | End: 2017-05-09 | Stop reason: HOSPADM

## 2017-05-09 RX ORDER — CYCLOPENTOLATE HYDROCHLORIDE 10 MG/ML
1 SOLUTION/ DROPS OPHTHALMIC
Status: COMPLETED | OUTPATIENT
Start: 2017-05-09 | End: 2017-05-09

## 2017-05-09 RX ORDER — ONDANSETRON 2 MG/ML
INJECTION INTRAMUSCULAR; INTRAVENOUS PRN
Status: DISCONTINUED | OUTPATIENT
Start: 2017-05-09 | End: 2017-05-09

## 2017-05-09 RX ORDER — PROPARACAINE HYDROCHLORIDE 5 MG/ML
1 SOLUTION/ DROPS OPHTHALMIC ONCE
Status: COMPLETED | OUTPATIENT
Start: 2017-05-09 | End: 2017-05-09

## 2017-05-09 RX ORDER — TROPICAMIDE 10 MG/ML
1 SOLUTION/ DROPS OPHTHALMIC
Status: COMPLETED | OUTPATIENT
Start: 2017-05-09 | End: 2017-05-09

## 2017-05-09 RX ORDER — SODIUM CHLORIDE, SODIUM LACTATE, POTASSIUM CHLORIDE, CALCIUM CHLORIDE 600; 310; 30; 20 MG/100ML; MG/100ML; MG/100ML; MG/100ML
INJECTION, SOLUTION INTRAVENOUS CONTINUOUS
Status: DISCONTINUED | OUTPATIENT
Start: 2017-05-09 | End: 2017-05-09 | Stop reason: HOSPADM

## 2017-05-09 RX ORDER — FENTANYL CITRATE 50 UG/ML
INJECTION, SOLUTION INTRAMUSCULAR; INTRAVENOUS PRN
Status: DISCONTINUED | OUTPATIENT
Start: 2017-05-09 | End: 2017-05-09

## 2017-05-09 RX ORDER — ONDANSETRON 2 MG/ML
4 INJECTION INTRAMUSCULAR; INTRAVENOUS EVERY 30 MIN PRN
Status: DISCONTINUED | OUTPATIENT
Start: 2017-05-09 | End: 2017-05-09 | Stop reason: HOSPADM

## 2017-05-09 RX ORDER — ONDANSETRON 4 MG/1
4 TABLET, ORALLY DISINTEGRATING ORAL EVERY 30 MIN PRN
Status: DISCONTINUED | OUTPATIENT
Start: 2017-05-09 | End: 2017-05-09 | Stop reason: HOSPADM

## 2017-05-09 RX ORDER — BALANCED SALT SOLUTION 6.4; .75; .48; .3; 3.9; 1.7 MG/ML; MG/ML; MG/ML; MG/ML; MG/ML; MG/ML
SOLUTION OPHTHALMIC PRN
Status: DISCONTINUED | OUTPATIENT
Start: 2017-05-09 | End: 2017-05-09 | Stop reason: HOSPADM

## 2017-05-09 RX ORDER — PROPOFOL 10 MG/ML
INJECTION, EMULSION INTRAVENOUS PRN
Status: DISCONTINUED | OUTPATIENT
Start: 2017-05-09 | End: 2017-05-09

## 2017-05-09 RX ORDER — PROPARACAINE HYDROCHLORIDE 5 MG/ML
SOLUTION/ DROPS OPHTHALMIC PRN
Status: DISCONTINUED | OUTPATIENT
Start: 2017-05-09 | End: 2017-05-09 | Stop reason: HOSPADM

## 2017-05-09 RX ORDER — FENTANYL CITRATE 50 UG/ML
25-50 INJECTION, SOLUTION INTRAMUSCULAR; INTRAVENOUS
Status: DISCONTINUED | OUTPATIENT
Start: 2017-05-09 | End: 2017-05-09 | Stop reason: HOSPADM

## 2017-05-09 RX ORDER — EPHEDRINE SULFATE 50 MG/ML
INJECTION, SOLUTION INTRAMUSCULAR; INTRAVENOUS; SUBCUTANEOUS PRN
Status: DISCONTINUED | OUTPATIENT
Start: 2017-05-09 | End: 2017-05-09

## 2017-05-09 RX ADMIN — CYCLOPENTOLATE HYDROCHLORIDE 1 DROP: 10 SOLUTION/ DROPS OPHTHALMIC at 07:47

## 2017-05-09 RX ADMIN — TROPICAMIDE 1 DROP: 10 SOLUTION/ DROPS OPHTHALMIC at 07:47

## 2017-05-09 RX ADMIN — FENTANYL CITRATE 50 MCG: 50 INJECTION, SOLUTION INTRAMUSCULAR; INTRAVENOUS at 09:15

## 2017-05-09 RX ADMIN — LIDOCAINE HYDROCHLORIDE 40 MG: 20 INJECTION, SOLUTION INFILTRATION; PERINEURAL at 09:15

## 2017-05-09 RX ADMIN — FENTANYL CITRATE 12.5 MCG: 50 INJECTION, SOLUTION INTRAMUSCULAR; INTRAVENOUS at 10:41

## 2017-05-09 RX ADMIN — PHENYLEPHRINE HYDROCHLORIDE 50 MCG: 10 INJECTION, SOLUTION INTRAMUSCULAR; INTRAVENOUS; SUBCUTANEOUS at 09:30

## 2017-05-09 RX ADMIN — TROPICAMIDE 1 DROP: 10 SOLUTION/ DROPS OPHTHALMIC at 07:54

## 2017-05-09 RX ADMIN — PROPARACAINE HYDROCHLORIDE 1 DROP: 5 SOLUTION/ DROPS OPHTHALMIC at 07:41

## 2017-05-09 RX ADMIN — LIDOCAINE HYDROCHLORIDE 1 ML: 10 INJECTION, SOLUTION EPIDURAL; INFILTRATION; INTRACAUDAL; PERINEURAL at 07:56

## 2017-05-09 RX ADMIN — PHENYLEPHRINE HYDROCHLORIDE 1 DROP: 2.5 SOLUTION/ DROPS OPHTHALMIC at 07:54

## 2017-05-09 RX ADMIN — TROPICAMIDE 1 DROP: 10 SOLUTION/ DROPS OPHTHALMIC at 07:59

## 2017-05-09 RX ADMIN — PHENYLEPHRINE HYDROCHLORIDE 100 MCG: 10 INJECTION, SOLUTION INTRAMUSCULAR; INTRAVENOUS; SUBCUTANEOUS at 09:38

## 2017-05-09 RX ADMIN — Medication 5 MG: at 09:30

## 2017-05-09 RX ADMIN — PROPOFOL 80 MG: 10 INJECTION, EMULSION INTRAVENOUS at 09:16

## 2017-05-09 RX ADMIN — ONDANSETRON 4 MG: 2 INJECTION INTRAMUSCULAR; INTRAVENOUS at 09:41

## 2017-05-09 RX ADMIN — PHENYLEPHRINE HYDROCHLORIDE 100 MCG: 10 INJECTION, SOLUTION INTRAMUSCULAR; INTRAVENOUS; SUBCUTANEOUS at 09:29

## 2017-05-09 RX ADMIN — PHENYLEPHRINE HYDROCHLORIDE 1 DROP: 2.5 SOLUTION/ DROPS OPHTHALMIC at 07:47

## 2017-05-09 RX ADMIN — SODIUM CHLORIDE, POTASSIUM CHLORIDE, SODIUM LACTATE AND CALCIUM CHLORIDE: 600; 310; 30; 20 INJECTION, SOLUTION INTRAVENOUS at 07:56

## 2017-05-09 RX ADMIN — POVIDONE-IODINE 1 DROP: 5 SOLUTION OPHTHALMIC at 07:47

## 2017-05-09 RX ADMIN — CYCLOPENTOLATE HYDROCHLORIDE 1 DROP: 10 SOLUTION/ DROPS OPHTHALMIC at 07:59

## 2017-05-09 RX ADMIN — SODIUM CHLORIDE, POTASSIUM CHLORIDE, SODIUM LACTATE AND CALCIUM CHLORIDE: 600; 310; 30; 20 INJECTION, SOLUTION INTRAVENOUS at 09:14

## 2017-05-09 RX ADMIN — CYCLOPENTOLATE HYDROCHLORIDE 1 DROP: 10 SOLUTION/ DROPS OPHTHALMIC at 07:54

## 2017-05-09 RX ADMIN — PHENYLEPHRINE HYDROCHLORIDE 1 DROP: 2.5 SOLUTION/ DROPS OPHTHALMIC at 07:59

## 2017-05-09 ASSESSMENT — COPD QUESTIONNAIRES: COPD: 1

## 2017-05-09 ASSESSMENT — ENCOUNTER SYMPTOMS: DYSRHYTHMIAS: 1

## 2017-05-09 ASSESSMENT — LIFESTYLE VARIABLES: TOBACCO_USE: 1

## 2017-05-09 NOTE — ANESTHESIA CARE TRANSFER NOTE
Patient: Helene Gibbs    Procedure(s):  RIGHT EYE PHACOEMULSIFICATION CLEAR CORNEA WITH TORIC INTRAOCULAR LENS IMPLANT  - Wound Class: I-Clean    Diagnosis: cataract  Diagnosis Additional Information: No value filed.    Anesthesia Type:   General     Note:  Airway :Face Mask  Patient transferred to:PACU  Comments: Pt exhibits spontaneous respirations, follows commands, suctioned, LMA removed, exchanging well, transferred to pacu with O2 @ 10L via mask, all monitors and alarms on, report to RN, VSS.      Vitals: (Last set prior to Anesthesia Care Transfer)    CRNA VITALS  5/9/2017 0934 - 5/9/2017 1011      5/9/2017             Pulse: 72    Ht Rate: 74    SpO2: 100 %    Resp Rate (observed): (!)  6                Electronically Signed By: ALLEN Simms CRNA  May 9, 2017  10:11 AM

## 2017-05-09 NOTE — ANESTHESIA PREPROCEDURE EVALUATION
Anesthesia Evaluation     . Pt has had prior anesthetic.            ROS/MED HX    ENT/Pulmonary:     (+)tobacco use, Past use COPD, , . .   (-) sleep apnea   Neurologic: Comment: Chronic neck and back pain, Bell's palsy     (+)dementia,     Cardiovascular: Comment: CXR- 7/9/15- mild pulm vascular prom., [place]. V infiltrate less prom.  Breathing as good as it ever gets today.      V paced, s/p AV node ablation    (+) Dyslipidemia, hypertension-Peripheral Vascular Disease-CAD, --. : . CHF . . pacemaker :. dysrhythmias a-fib, . pulmonary hypertension,      (-) POND   METS/Exercise Tolerance:     Hematologic:     (+) Anemia, -      Musculoskeletal: Comment: Spinal srenosis  (+) arthritis, , , -       GI/Hepatic: Comment: Rectal stenosis, constipation       (-) GERD and hiatal hernia   Renal/Genitourinary:     (+) chronic renal disease, type: CRI,       Endo:     (+) type II DM Diabetic complications: nephropathy, Obesity, .      Psychiatric:     (+) psychiatric history anxiety and depression      Infectious Disease:         Malignancy:   (+) Malignancy History of Breast          Other:    (+) H/O Chronic Pain,H/O chronic opiod use ,                    Physical Exam      Airway   Mallampati: II  TM distance: >3 FB  Neck ROM: full    Dental   (+) loose, caps, chipped and partials  Comment: Lower teeth wired across, not a stable situation,  Pt understands she may lose lower teeth and we are not responsible for repair/replacement of teeth.      Cardiovascular   Rhythm and rate: regular      Pulmonary    breath sounds clear to auscultation                        Anesthesia Plan      History & Physical Review  History and physical reviewed and following examination; no interval change.    ASA Status:  3 .        Plan for General     Surgeon and Pt require GA cannot be flat.  Complete discussion of risks, alternatives and plan.      No midazolam, minimal fentanyl      Postoperative Care      Consents  Anesthetic plan,  risks, benefits and alternatives discussed with:  Patient or representative and Patient..                          .  Procedure: Procedure(s):  PHACOEMULSIFICATION CLEAR CORNEA WITH STANDARD INTRAOCULAR LENS IMPLANT  Preop diagnosis: CATARACT    Allergies   Allergen Reactions     Penicillins Hives     Ambien [Zolpidem Tartrate]      Hallucinations and fell out of bed at night.     Definity      Caused a syncopal episode.     Sulfa Drugs Itching     Cymbalta Rash     Fluconazole Rash     Past Medical History:   Diagnosis Date     Anemia      Ankle arthritis      Arthritis      Back pain      Bell's palsy 9/08    left     Breast CA (H) 2004-    left lumpectomy, radiation, -recurrence     Colon polyps     colonoscopy-9/12-next due in 9/13     Congestive heart failure (H)      COPD (chronic obstructive pulmonary disease) (H)      Coronary artery disease      DM (diabetes mellitus) (H)      GERD (gastroesophageal reflux disease)      Hyperlipidemia      Hypertension      Lumbar spinal stenosis     use cane     Obesity      Osteoporosis      Other chronic pain      Pacemaker      Permanent atrial fibrillation (H) 8/2008    S/P AV node ablation and pacemaker 10-25-12       Pleural effusion      Pulmonary hypertension (H)      Rectal stenosis      Tobacco abuse     current     Past Surgical History:   Procedure Laterality Date     ARTHROSCOPY SHOULDER RT/LT  1999, 2004    Bilateral, right then left     C DEXA, BONE DENSITY, AXIAL SKEL       C MAMMOGRAM, SCREENING  1/2009     COLONOSCOPY N/A 10/7/2016    Procedure: COMBINED COLONOSCOPY, SINGLE OR MULTIPLE BIOPSY/POLYPECTOMY BY BIOPSY;  Surgeon: Cassandra Mccord MD;  Location: [facility]     H ABLATION AV NODE  10/2012     HC COLONOSCOPY THRU STOMA, DIAGNOSTIC  ? 2005     IMPLANT PACEMAKER  10/2012    [place] [id number], [id number]     JOINT REPLACEMTN, KNEE RT/LT      Joint Replacement knee bilateral     LAPAROSCOPIC CHOLECYSTECTOMY WITH CHOLANGIOGRAMS N/A 12/14/2015    Procedure:  LAPAROSCOPIC CHOLECYSTECTOMY WITH CHOLANGIOGRAMS;  Surgeon: Ervin Amos MD;  Location:  OR     LUMPECTOMY BREAST      Left-2004     PHACOEMULSIFICATION CLEAR CORNEA WITH STANDARD INTRAOCULAR LENS IMPLANT Left 7/16/2015    Procedure: PHACOEMULSIFICATION CLEAR CORNEA WITH STANDARD INTRAOCULAR LENS IMPLANT;  Surgeon: Sai Obregon MD;  Location: [facility]     Prior to Admission medications    Medication Sig Start Date End Date Taking? Authorizing Provider   glipiZIDE (GLUCOTROL) 5 MG tablet Take 5 mg every morning and 2.5 mg in evening 7/9/15  Yes Neisha Esparza MD   fentaNYL (DURAGESIC) 12 mcg/hr patch 72 hr Place 1 patch onto the skin every 72 hours 7/9/15  Yes Neisha Esparza MD   gabapentin (NEURONTIN) 300 MG capsule Take 1 capsule (300 mg) by mouth 2 times daily 4/2/15  Yes Neisha Esparza MD   simvastatin (ZOCOR) 10 MG tablet Take 1 tablet (10 mg) by mouth At Bedtime 3/20/15  Yes Neisha Esparza MD   lisinopril (PRINIVIL,ZESTRIL) 10 MG tablet Take 1 tablet (10 mg) by mouth daily 3/3/15  Yes Neisha Esparza MD   albuterol (PROAIR HFA, PROVENTIL HFA, VENTOLIN HFA) 108 (90 BASE) MCG/ACT inhaler Inhale 2 puffs into the lungs every 4 hours as needed for shortness of breath / dyspnea or wheezing 2/10/15  Yes Neisha Esparza MD   fluticasone (FLONASE) 50 MCG/ACT nasal spray Spray 1-2 sprays into both nostrils daily 2/10/15  Yes Neisha Esparza MD   tiotropium (SPIRIVA HANDIHALER) 18 MCG inhalation capsule Inhale 1 capsule (18 mcg) into the lungs daily 1/20/15  Yes Neisha Esparza MD   apixaban ANTICOAGULANT (ELIQUIS) 5 MG tablet Take 1 tablet (5 mg) by mouth 2 times daily 1/16/15  Yes Jc Juarez MD   furosemide (LASIX) 20 MG tablet 1-2 tablets daily. May repeat every afternoon prn weight gain of 2# and/ or 2+ edema lower extremities. 12/22/14  Yes Neisha Esparza MD   magnesium hydroxide (MILK OF MAGNESIA) 400 MG/5ML suspension Take 30 mLs by mouth At Bedtime  8/20/14   Yes Reported, Patient   polyethylene glycol (MIRALAX/GLYCOLAX) powder Take 17 g by mouth 2 times daily 8/20/14  Yes Reported, Patient   senna-docusate (SENOKOT-S;PERICOLACE) 8.6-50 MG per tablet Take 2 tablets by mouth 2 times daily 8/20/14  Yes Reported, Patient   Acetaminophen (TYLENOL PO) Take 1,000 mg by mouth 3 times daily 8/20/14  Yes Reported, Patient   Multiple Vitamins-Minerals (PRESERVISION/LUTEIN) CAPS Take 1 capsule by mouth daily  4/16/13  Yes    ORDER FOR DME, SET TO LOCAL PRINT, Equipment being ordered: AccuChek Smart View strips  Patient tests twice daily. 5/22/15   Neisha Esparza MD   betamethasone valerate (VALISONE) 0.1 % cream Apply topically 2 times daily Use sparingly 12/31/14   Neisha Esparza MD   blood glucose monitoring (ACCU-CHEK FASTCLIX) lancets Use to test blood sugar 2 times daily or as directed. 10/24/14   Neisha Esparza MD   hydrocortisone 0.5 % cream Apply topically 3 times daily as needed for itching (on arms) SEND HOME WITH PATIENT 7/1/14   Keila Cerda PA-C   calcium-vitamin D (CALCIUM 600-D) 600-400 MG-UNIT per tablet Take 1 tablet by mouth daily.    Reported, Patient     Current Facility-Administered Medications Ordered in Epic   Medication Dose Route Frequency Last Rate Last Dose     lidocaine 1 % 1 mL  1 mL Other Q1H PRN   1 mL at 05/09/17 0756     lactated ringers infusion   Intravenous Continuous 25 mL/hr at 05/09/17 0756       No current [facility]-ordered outpatient prescriptions on file.     Wt Readings from Last 1 Encounters:   04/25/17 69.9 kg (154 lb 1.6 oz)     Temp Readings from Last 1 Encounters:   05/09/17 36.7  C (98.1  F) (Temporal)     BP Readings from Last 6 Encounters:   05/09/17 132/60   04/25/17 126/70   02/14/17 134/62   12/20/16 137/79   11/08/16 152/80   10/28/16 141/85     Pulse Readings from Last 4 Encounters:   04/25/17 70   02/14/17 72   12/20/16 71   11/08/16 71     Resp Readings from Last 1 Encounters:   05/09/17 16     SpO2 Readings  from Last 1 Encounters:   05/09/17 99%     Recent Labs   Lab Test  07/09/15   1435  04/02/15   1350   NA  135  135   POTASSIUM  5.2  4.7   CHLORIDE  101  103   CO2  28  25   ANIONGAP  6  7   GLC  84  83   BUN  20  19   CR  1.12*  1.13*   GERARD  10.7*  9.3     Recent Labs   Lab Test  07/09/15   1435  09/22/14   1549   WBC  6.2  5.9   HGB  10.3*  11.9   PLT  301  258     Recent Labs   Lab Test 01/08/15 12/31/14   INR  1.7*  1.6*      RECENT LABS:   ECG:   ECHO:   CXR:    Procedure: Procedure(s):  PHACOEMULSIFICATION CLEAR CORNEA WITH TORIC INTRAOCULAR LENS IMPLANT  Preop diagnosis: cataract    Allergies   Allergen Reactions     Penicillins Hives     Ambien [Zolpidem Tartrate]      Hallucinations and fell out of bed at night.     Definity      Caused a syncopal episode.     Sulfa Drugs Itching     Cymbalta Rash     Fluconazole Rash     Past Medical History:   Diagnosis Date     Anemia      Ankle arthritis      Arthritis      Back pain      Bell's palsy 9/08    left     Breast CA (H) 2004-    left lumpectomy, radiation, -recurrence     Colon polyps     colonoscopy-9/12-next due in 9/13     Congestive heart failure (H)      COPD (chronic obstructive pulmonary disease) (H)      Coronary artery disease      DM (diabetes mellitus) (H)      GERD (gastroesophageal reflux disease)      Hyperlipidemia      Hypertension      Lumbar spinal stenosis     use cane     Obesity      Osteoporosis      Other chronic pain      Pacemaker      Permanent atrial fibrillation (H) 8/2008    S/P AV node ablation and pacemaker 10-25-12       Pleural effusion      Pulmonary hypertension (H)      Rectal stenosis      Tobacco abuse     current     Past Surgical History:   Procedure Laterality Date     ARTHROSCOPY SHOULDER RT/LT  1999, 2004    Bilateral, right then left     C DEXA, BONE DENSITY, AXIAL SKEL       C MAMMOGRAM, SCREENING  1/2009     COLONOSCOPY N/A 10/7/2016    Procedure: COMBINED COLONOSCOPY, SINGLE OR MULTIPLE BIOPSY/POLYPECTOMY BY  BIOPSY;  Surgeon: Cassandra Mccord MD;  Location:  GI     H ABLATION AV NODE  10/2012     HC COLONOSCOPY THRU STOMA, DIAGNOSTIC  ? 2005     IMPLANT PACEMAKER  10/2012    [place] [id number], [id number]     JOINT REPLACEMTN, KNEE RT/LT      Joint Replacement knee bilateral     LAPAROSCOPIC CHOLECYSTECTOMY WITH CHOLANGIOGRAMS N/A 12/14/2015    Procedure: LAPAROSCOPIC CHOLECYSTECTOMY WITH CHOLANGIOGRAMS;  Surgeon: Ervin Amos MD;  Location:  OR     LUMPECTOMY BREAST      Left-2004     PHACOEMULSIFICATION CLEAR CORNEA WITH STANDARD INTRAOCULAR LENS IMPLANT Left 7/16/2015    Procedure: PHACOEMULSIFICATION CLEAR CORNEA WITH STANDARD INTRAOCULAR LENS IMPLANT;  Surgeon: Sai Obregon MD;  Location:  EC     Prior to Admission medications    Medication Sig Start Date End Date Taking? Authorizing Provider   hydrOXYzine (VISTARIL) 25 MG capsule Take 1 capsule (25 mg) by mouth 2 times daily as needed for itching 4/25/17  Yes Neisha Esparza MD   lisinopril (PRINIVIL/ZESTRIL) 5 MG tablet TAKE 1 TABLET (5 MG) BY MOUTH DAILY 4/11/17  Yes Neisha Esparza MD   fluticasone (FLONASE) 50 MCG/ACT spray INHALE 1 TO 2 SPRAYS INTO BOTH NOSTRILS DAILY 2/23/17  Yes Neisha Esparza MD   traMADol (ULTRAM) 50 MG tablet TAKE 1 TABLET BY MOUTH EVERY 6 HOURS AS NEEDED FOR MODERATE PAIN 2/1/17  Yes Neisha Esparza MD   ondansetron (ZOFRAN ODT) 4 MG ODT tab Take 1 tablet (4 mg) by mouth every 6 hours as needed for nausea 1/2/17  Yes Neisha Esparza MD   glipiZIDE (GLUCOTROL) 5 MG tablet TAKE 1 TABLET BY MOUTH EVERY MORNING AND 0.5 TABLET EVERY EVENING 11/30/16  Yes Neisha Esparza MD   simvastatin (ZOCOR) 10 MG tablet Take 1 tablet (10 mg) by mouth At Bedtime 11/30/16  Yes Neisha Esparza MD   PROCTOSOL HC 2.5 % rectal cream PLACE RECTALLY 2 TIMES DAILY AS NEEDED FOR HEMORRHOIDS 11/15/16  Yes Neisha Esparza MD   COMPOUND (CMPD RX) - PHARMACY TO MIX COMPOUNDED MEDICATION Vaginal valium- 10 mg suppository 11/8/16  Yes  Merle Dominguez PA-C   Tiotropium Bromide Monohydrate (SPIRIVA HANDIHALER IN)    Yes Reported, Patient   betamethasone valerate (VALISONE) 0.1 % cream Apply topically 2 times daily Use sparingly 9/27/16  Yes Neisha Esparza MD   hydrocortisone (ANUSOL-HC) 2.5 % rectal cream Place rectally 2 times daily as needed for hemorrhoids 9/27/16  Yes Neisha Esparza MD   ACCU-CHEK SMARTVIEW test strip 1 strip by In Vitro route 2 times daily Or as directed 4/8/16  Yes Neisha Esparza MD   blood glucose monitoring (ACCU-CHEK FASTCLIX) lancets USE TO TEST BLOOD SUGAR TWO TIMES A DAY OR AS DIRECTED 4/8/16  Yes Neisha Esparza MD   apixaban ANTICOAGULANT (ELIQUIS) 5 MG tablet Take 1 tablet (5 mg) by mouth 2 times daily 2/24/16  Yes Angelica Grady, APRN CNP   furosemide (LASIX) 20 MG tablet Take 20 mg by mouth daily Repeat every aftenoon prn weight gain of 2 lbs and or 2+ edema lower extremities   Yes Reported, Patient   Multiple Vitamins-Minerals (PRESERVISION/LUTEIN) CAPS Take 1 capsule by mouth 2 times daily   Yes Reported, Patient   nystatin (MYCOSTATIN) 571248 UNIT/GM POWD Apply topically 2 times daily as needed 9/1/15  Yes Neisha Esparza MD   albuterol (PROAIR HFA, PROVENTIL HFA, VENTOLIN HFA) 108 (90 BASE) MCG/ACT inhaler Inhale 2 puffs into the lungs every 4 hours as needed for shortness of breath / dyspnea or wheezing 2/10/15  Yes Neisha Esparza MD   magnesium hydroxide (MILK OF MAGNESIA) 400 MG/5ML suspension Take 30 mLs by mouth At Bedtime  8/20/14  Yes Reported, Patient   senna-docusate (SENOKOT-S;PERICOLACE) 8.6-50 MG per tablet Take 2 tablets by mouth 2 times daily 8/20/14  Yes Reported, Patient   hydrocortisone 0.5 % cream Apply topically 3 times daily as needed for itching (on arms) SEND HOME WITH PATIENT 7/1/14  Yes Cousins, Keila Al PA-C   fluocinolone (SYNALAR) 0.01 % external solution  9/19/16   Reported, Patient   ORDER FOR DME, SET TO LOCAL PRINT, Equipment being ordered: AccuChek  Smart View strips  Patient tests twice daily. 5/22/15   Neisha Esparza MD     Current Facility-Administered Medications Ordered in Epic   Medication Dose Route Frequency Last Rate Last Dose     lidocaine 1 % 1 mL  1 mL Other Q1H PRN   1 mL at 05/09/17 0756     lactated ringers infusion   Intravenous Continuous 25 mL/hr at 05/09/17 0756       No current [facility]-ordered outpatient prescriptions on file.     Wt Readings from Last 1 Encounters:   04/25/17 69.9 kg (154 lb 1.6 oz)     Temp Readings from Last 1 Encounters:   05/09/17 36.7  C (98.1  F) (Temporal)     BP Readings from Last 6 Encounters:   05/09/17 132/60   04/25/17 126/70   02/14/17 134/62   12/20/16 137/79   11/08/16 152/80   10/28/16 141/85     Pulse Readings from Last 4 Encounters:   04/25/17 70   02/14/17 72   12/20/16 71   11/08/16 71     Resp Readings from Last 1 Encounters:   05/09/17 16     SpO2 Readings from Last 1 Encounters:   05/09/17 99%     Recent Labs   Lab Test  04/25/17   1445  10/28/16   1556   NA  135  135   POTASSIUM  5.0  4.0   CHLORIDE  101  101   CO2  24  30   ANIONGAP  10  4   GLC  99  130*   BUN  17  16   CR  1.20*  1.20*   GERARD  10.1  10.1     Recent Labs   Lab Test  04/25/17   1445  10/28/16   1556   WBC  7.0  6.2   HGB  11.8  14.8   PLT  291  187     Recent Labs   Lab Test  12/08/15   1705  12/03/15   0838   INR  1.21*  1.21*      RECENT LABS:   ECG:   ECHO:   CXR:

## 2017-05-09 NOTE — ANESTHESIA POSTPROCEDURE EVALUATION
Patient: Helene Gibbs    Procedure(s):  RIGHT EYE PHACOEMULSIFICATION CLEAR CORNEA WITH TORIC INTRAOCULAR LENS IMPLANT  - Wound Class: I-Clean    Diagnosis:cataract  Diagnosis Additional Information: No value filed.    Anesthesia Type:  General    Note:  Anesthesia Post Evaluation    Patient location during evaluation: PACU  Patient participation: Able to fully participate in evaluation  Level of consciousness: awake  Pain management: adequate  Airway patency: patent  Cardiovascular status: acceptable  Respiratory status: acceptable  Hydration status: acceptable  PONV: controlled     Anesthetic complications: None          Last vitals:  Vitals:    05/09/17 1045 05/09/17 1100 05/09/17 1117   BP: 140/79 132/64 130/68   Resp: 18 18 16   Temp:      SpO2:  100% 98%         Electronically Signed By: Amina Coburn MD  May 9, 2017  2:09 PM

## 2017-05-09 NOTE — DISCHARGE INSTRUCTIONS
[facility]est[facility] Discharge  Instructions  Anesthesia ([facility])   Adult Discharge Instructions (General)    For 24 hours after surgery    1. Get plenty of rest.  Make arrangements to have a responsible adult stay with you for at least 24 hours.  2. Do not drive or use heavy equipment for 24 hours.    3. Do not drink alcohol for 24 hours.  4. Do not sign legal documents or make important decisions for 24 hours.  5. Avoid strenuous or risky activities. You may feel lightheaded.  If so, sit for a few minutes before standing.  Have someone help you get up.   6. General anesthesia patients should drink only clear liquids (apple juice, ginger ale, broth or 7-Up). Be sure to drink enough fluids.  Move to a regular diet as you feel able.  7. Any questions of medical nature, call your physician.    [facility]  Post Operative Care  Following Cataract Surgery     ? If you have a gauze eye patch on, please do not remove it until it is removed by your physician at your first post-operative visit.    ? You may remove the clear eye shield and begin eye drops when you get home.    ? Wear the eye shield when sleeping for protection until your doctor stops its use.    ? Do not rub the operated eye.    ? Light sensitivity may be noticed. Sunglasses may be worn for comfort.    ? Some discomfort and irritation may be noticed. Acetaminophen (Tylenol) or Ibuprofen (Advil) may be taken for discomfort.    ? For irritation or minor discomfort, you may also use a clean cold washcloth held directly on the eye for 10 minute time periods, as needed.    ? Avoid bending over, strenuous activity or heavy lifting until approved by your doctor.    ? Keep the operated eye dry.    ? You may wash your hair, bathe or shower, but keep the operated eye closed while doing so.    ? Use medication exactly as prescribed by your doctor.  You may restart your regular home medications.    ? Bring all materials  and medications to the clinic on your first post-operative visit.    ? Call the doctor s office if any of the following should occur:  -  any sudden vision change  -  nausea or a severe headache  -  increase in pain not controlled  -  increased amount of floaters (black spots in front of vision)  -  or signs of infection (pus, increasing redness or tenderness)

## 2017-05-09 NOTE — IP AVS SNAPSHOT
[facility]    [address]    [place] [address]    Phone:  [phone]    Fax:  [phone]                                       After Visit Summary   5/9/2017    Helene Gibbs    MRN: [id number]           After Visit Summary Signature Page     I have received my discharge instructions, and my questions have been answered. I have discussed any challenges I see with this plan with the nurse or doctor.    ..........................................................................................................................................  Patient/Patient Representative Signature      ..........................................................................................................................................  Patient Representative Print Name and Relationship to Patient    ..................................................               ................................................  Date                                            Time    ..........................................................................................................................................  Reviewed by Signature/Title    ...................................................              ..............................................  Date                                                            Time

## 2017-05-09 NOTE — IP AVS SNAPSHOT
MRN:[id number]                      After Visit Summary   5/9/2017    Helene Gibbs    MRN: [id number]           Thank you!     Thank you for choosing [facility] for your care. Our goal is always to provide you with excellent care. Hearing back from our patients is one way we can continue to improve our services. Please take a few minutes to complete the written survey that you may receive in the mail after you visit with us. Thank you!        Patient Information     Date Of Birth          1935        About your hospital stay     You were admitted on:  May 9, 2017 [facility]    You were discharged on:  May 9, 2017       Who to Call     For medical emergencies, please call 911.  For non-urgent questions about your medical care, please call your primary care provider or clinic, [phone]  For questions related to your surgery, please call your surgery clinic        Attending Provider     Provider Specialty    Duc Richmond MD Ophthalmology       Primary Care Provider Office Phone # Fax #    Neisha MARIMAR Esparza [phone]       [facility]    [address]  [place] [address]        After Care Instructions     Eye drops as prescribed by physician.  Start drops today unless told otherwise by the physician           May use Tylenol or Advil for pain as directed by the physician           Notify Physician if you have severe headache or nausea           Remove patch per physician instruction           Return to clinic as instructed by physician           Wear eye shield or patch as directed                 Your next 10 appointments already scheduled     Jun 26, 2017  1:45 PM CDT   Pacemaker Check with [facility]   [facility] [place] ([facility])    [address]  [place] [id number] [phone]              Further  instructions from your care team       [facility] Discharge  Instructions  Anesthesia ([facility])   Adult Discharge Instructions (General)    For 24 hours after surgery    1. Get plenty of rest.  Make arrangements to have a responsible adult stay with you for at least 24 hours.  2. Do not drive or use heavy equipment for 24 hours.    3. Do not drink alcohol for 24 hours.  4. Do not sign legal documents or make important decisions for 24 hours.  5. Avoid strenuous or risky activities. You may feel lightheaded.  If so, sit for a few minutes before standing.  Have someone help you get up.   6. General anesthesia patients should drink only clear liquids (apple juice, ginger ale, broth or 7-Up). Be sure to drink enough fluids.  Move to a regular diet as you feel able.  7. Any questions of medical nature, call your physician.    [facility]  Post Operative Care  Following Cataract Surgery     ? If you have a gauze eye patch on, please do not remove it until it is removed by your physician at your first post-operative visit.    ? You may remove the clear eye shield and begin eye drops when you get home.    ? Wear the eye shield when sleeping for protection until your doctor stops its use.    ? Do not rub the operated eye.    ? Light sensitivity may be noticed. Sunglasses may be worn for comfort.    ? Some discomfort and irritation may be noticed. Acetaminophen (Tylenol) or Ibuprofen (Advil) may be taken for discomfort.    ? For irritation or minor discomfort, you may also use a clean cold washcloth held directly on the eye for 10 minute time periods, as needed.    ? Avoid bending over, strenuous activity or heavy lifting until approved by your doctor.    ? Keep the operated eye dry.    ? You may wash your hair, bathe or shower, but keep the operated eye closed while doing so.    ? Use medication exactly as prescribed by your doctor.  You may restart your regular home  medications.    ? Bring all materials and medications to the clinic on your first post-operative visit.    ? Call the doctor s office if any of the following should occur:  -  any sudden vision change  -  nausea or a severe headache  -  increase in pain not controlled  -  increased amount of floaters (black spots in front of vision)  -  or signs of infection (pus, increasing redness or tenderness)              Pending Results     No orders found from 5/7/2017 to 5/10/2017.            Admission Information     Date & Time Provider Department Dept. Phone    5/9/2017 Duc Richmond MD [facility] [phone]      Your Vitals Were     Blood Pressure Temperature Respirations Last Period Pulse Oximetry       132/64 98.1  F (36.7  C) (Temporal) 18 (LMP Unknown) 100%       Care EveryWhere ID     This is your Care EveryWhere ID. This could be used by other organizations to access your [place] [facility] medical records  [id number]           Review of your medicines      UNREVIEWED medicines. Ask your doctor about these medicines        Dose / Directions    albuterol 108 (90 BASE) MCG/ACT Inhaler   Commonly known as:  PROAIR HFA/PROVENTIL HFA/VENTOLIN HFA   Used for:  COPD (chronic obstructive pulmonary disease) (H)        Dose:  2 puff   Inhale 2 puffs into the lungs every 4 hours as needed for shortness of breath / dyspnea or wheezing   Quantity:  1 Inhaler   Refills:  5       apixaban ANTICOAGULANT 5 MG tablet   Commonly known as:  ELIQUIS   Used for:  Atrial fibrillation, unspecified        Dose:  5 mg   Take 1 tablet (5 mg) by mouth 2 times daily   Quantity:  180 tablet   Refills:  3       betamethasone valerate 0.1 % cream   Commonly known as:  VALISONE   Used for:  Rash        Apply topically 2 times daily Use sparingly   Quantity:  15 g   Refills:  1       COMPOUND - PHARMACY TO MIX COMPOUNDED MEDICATION   Commonly known as:  CMPD RX   Used for:  Vaginal pain, Pelvic floor tension, Urinary  retention        Vaginal valium- 10 mg suppository   Quantity:  42 suppository   Refills:  3       fluocinolone 0.01 % solution   Commonly known as:  SYNALAR        Refills:  1       fluticasone 50 MCG/ACT spray   Commonly known as:  FLONASE   Used for:  Rhinitis, unspecified type        INHALE 1 TO 2 SPRAYS INTO BOTH NOSTRILS DAILY   Quantity:  16 mL   Refills:  3       furosemide 20 MG tablet   Commonly known as:  LASIX        Dose:  20 mg   Take 20 mg by mouth daily Repeat every aftenoon prn weight gain of 2 lbs and or 2+ edema lower extremities   Refills:  0       glipiZIDE 5 MG tablet   Commonly known as:  GLUCOTROL   Used for:  Type 2 diabetes mellitus with diabetic nephropathy, without long-term current use of insulin (H)        TAKE 1 TABLET BY MOUTH EVERY MORNING AND 0.5 TABLET EVERY EVENING   Quantity:  135 tablet   Refills:  1       hydrocortisone 0.5 % cream        Apply topically 3 times daily as needed for itching (on arms) SEND HOME WITH PATIENT   Refills:  0       * hydrocortisone 2.5 % cream   Commonly known as:  ANUSOL-HC   Used for:  External hemorrhoids        Place rectally 2 times daily as needed for hemorrhoids   Quantity:  28 g   Refills:  1       * PROCTOSOL HC 2.5 % cream   Used for:  Hemorrhoid   Generic drug:  hydrocortisone        PLACE RECTALLY 2 TIMES DAILY AS NEEDED FOR HEMORRHOIDS   Quantity:  28.35 g   Refills:  5       hydrOXYzine 25 MG capsule   Commonly known as:  VISTARIL   Used for:  Itching        Dose:  25 mg   Take 1 capsule (25 mg) by mouth 2 times daily as needed for itching   Quantity:  180 capsule   Refills:  1       lisinopril 5 MG tablet   Commonly known as:  PRINIVIL/ZESTRIL   Used for:  Essential hypertension        TAKE 1 TABLET (5 MG) BY MOUTH DAILY   Quantity:  90 tablet   Refills:  1       magnesium hydroxide 400 MG/5ML suspension   Commonly known as:  MILK OF MAGNESIA        Dose:  30 mL   Take 30 mLs by mouth At Bedtime   Refills:  0       nystatin [id number]  UNIT/GM Powd   Commonly known as:  MYCOSTATIN   Used for:  Rash        Apply topically 2 times daily as needed   Quantity:  60 g   Refills:  1       ondansetron 4 MG ODT tab   Commonly known as:  ZOFRAN ODT   Used for:  Nausea        Dose:  4 mg   Take 1 tablet (4 mg) by mouth every 6 hours as needed for nausea   Quantity:  30 tablet   Refills:  3       PRESERVISION/LUTEIN Caps        Dose:  1 capsule   Take 1 capsule by mouth 2 times daily   Refills:  0       senna-docusate 8.6-50 MG per tablet   Commonly known as:  SENOKOT-S;PERICOLACE        Dose:  2 tablet   Take 2 tablets by mouth 2 times daily   Refills:  0       simvastatin 10 MG tablet   Commonly known as:  ZOCOR   Used for:  Type 2 diabetes mellitus with diabetic nephropathy, without long-term current use of insulin (H)        Dose:  10 mg   Take 1 tablet (10 mg) by mouth At Bedtime   Quantity:  90 tablet   Refills:  1       SPIRIVA HANDIHALER IN        Refills:  0       traMADol 50 MG tablet   Commonly known as:  ULTRAM   Used for:  Intractable back pain        TAKE 1 TABLET BY MOUTH EVERY 6 HOURS AS NEEDED FOR MODERATE PAIN   Quantity:  100 tablet   Refills:  1       * Notice:  This list has 2 medication(s) that are the same as other medications prescribed for you. Read the directions carefully, and ask your doctor or other care provider to review them with you.      CONTINUE these medicines which have NOT CHANGED        Dose / Directions    ACCU-CHEK SMARTVIEW test strip   Used for:  Type 2 diabetes mellitus with diabetic nephropathy (H)   Generic drug:  blood glucose monitoring        Dose:  1 strip   1 strip by In Vitro route 2 times daily Or as directed   Quantity:  100 each   Refills:  1       blood glucose monitoring lancets   Used for:  Type 2 diabetes mellitus with diabetic nephropathy (H)        USE TO TEST BLOOD SUGAR TWO TIMES A DAY OR AS DIRECTED   Quantity:  1 Box   Refills:  11       order for DME   Used for:  Type II or unspecified type  diabetes mellitus without mention of complication, not stated as uncontrolled        Equipment being ordered: AccuChek Smart View strips Patient tests twice daily.   Quantity:  1 each   Refills:  11                Protect others around you: Learn how to safely use, store and throw away your medicines at www.disposemymeds.org.             Medication List: This is a list of all your medications and when to take them. Check marks below indicate your daily home schedule. Keep this list as a reference.      Medications           Morning Afternoon Evening Bedtime As Needed    ACCU-CHEK SMARTVIEW test strip   1 strip by In Vitro route 2 times daily Or as directed   Generic drug:  blood glucose monitoring                                albuterol 108 (90 BASE) MCG/ACT Inhaler   Commonly known as:  PROAIR HFA/PROVENTIL HFA/VENTOLIN HFA   Inhale 2 puffs into the lungs every 4 hours as needed for shortness of breath / dyspnea or wheezing                                apixaban ANTICOAGULANT 5 MG tablet   Commonly known as:  ELIQUIS   Take 1 tablet (5 mg) by mouth 2 times daily                                betamethasone valerate 0.1 % cream   Commonly known as:  VALISONE   Apply topically 2 times daily Use sparingly                                blood glucose monitoring lancets   USE TO TEST BLOOD SUGAR TWO TIMES A DAY OR AS DIRECTED                                COMPOUND - PHARMACY TO MIX COMPOUNDED MEDICATION   Commonly known as:  CMPD RX   Vaginal valium- 10 mg suppository                                fluocinolone 0.01 % solution   Commonly known as:  SYNALAR                                fluticasone 50 MCG/ACT spray   Commonly known as:  FLONASE   INHALE 1 TO 2 SPRAYS INTO BOTH NOSTRILS DAILY                                furosemide 20 MG tablet   Commonly known as:  LASIX   Take 20 mg by mouth daily Repeat every aftenoon prn weight gain of 2 lbs and or 2+ edema lower extremities                                 glipiZIDE 5 MG tablet   Commonly known as:  GLUCOTROL   TAKE 1 TABLET BY MOUTH EVERY MORNING AND 0.5 TABLET EVERY EVENING                                hydrocortisone 0.5 % cream   Apply topically 3 times daily as needed for itching (on arms) SEND HOME WITH PATIENT                                * hydrocortisone 2.5 % cream   Commonly known as:  ANUSOL-HC   Place rectally 2 times daily as needed for hemorrhoids                                * PROCTOSOL HC 2.5 % cream   PLACE RECTALLY 2 TIMES DAILY AS NEEDED FOR HEMORRHOIDS   Generic drug:  hydrocortisone                                hydrOXYzine 25 MG capsule   Commonly known as:  VISTARIL   Take 1 capsule (25 mg) by mouth 2 times daily as needed for itching                                lisinopril 5 MG tablet   Commonly known as:  PRINIVIL/ZESTRIL   TAKE 1 TABLET (5 MG) BY MOUTH DAILY                                magnesium hydroxide 400 MG/5ML suspension   Commonly known as:  MILK OF MAGNESIA   Take 30 mLs by mouth At Bedtime                                nystatin [id number] UNIT/GM Powd   Commonly known as:  MYCOSTATIN   Apply topically 2 times daily as needed                                ondansetron 4 MG ODT tab   Commonly known as:  ZOFRAN ODT   Take 1 tablet (4 mg) by mouth every 6 hours as needed for nausea                                order for DME   Equipment being ordered: AccuChek Smart View strips Patient tests twice daily.                                PRESERVISION/LUTEIN Caps   Take 1 capsule by mouth 2 times daily                                senna-docusate 8.6-50 MG per tablet   Commonly known as:  SENOKOT-S;PERICOLACE   Take 2 tablets by mouth 2 times daily                                simvastatin 10 MG tablet   Commonly known as:  ZOCOR   Take 1 tablet (10 mg) by mouth At Bedtime                                SPIRIVA HANDIHALER IN                                traMADol 50 MG tablet   Commonly known as:  ULTRAM   TAKE 1 TABLET BY MOUTH  EVERY 6 HOURS AS NEEDED FOR MODERATE PAIN                                * Notice:  This list has 2 medication(s) that are the same as other medications prescribed for you. Read the directions carefully, and ask your doctor or other care provider to review them with you.

## 2017-05-09 NOTE — OP NOTE
[facility]  Ophthalmology Operative Note    PREOPERATIVE DIAGNOSIS:  Visually significant cataract of the Right eye.       POSTOPERATIVE DIAGNOSIS:  Visually significancataract of the Right eye.     ANESTHESIA: General anesthesia      OPERATION:  Clear corneal phacoemulsification with toric intraocular lens implant of the Right eye aligned at 3 degrees.      PROCEDURE:  Helene Gibbs was brought into the operating room where general anesthesia was induced.  Under the microscope after a sterile ophthalmic prep, a lid speculum was put into place.  Anterior chamber was entered with side port blade.  The axis for alignment of the toric IOL was marked.  The anterior chamber was then filled with preservative free lidocaine and epinephrine solution and a viscoelastic material.  A keratome blade was then used to fashion a 2.5 mm temporal incision at the limbus and anterior capsule of the lens was opened using a circular capsulorrhexis.  The lens was carefully hydrodissected and rotated.  The phacoemulsification tip was then introduced into the eye and a central groove fashioned.  The nuclear material was then removed in a stop and chop technique.  The irrigation-aspiration apparatus was then utilized to clear the remaining cortical material.  The posterior capsule was polished and a foldable posterior chamber intraocular lens was then introduced into the capsular bag, unfolded, and rotated into position.  The toric axis markers were rotated to align with the previously made limbal marks.  The viscoelastic material was aspirated.  The wounds were irrigated and found to be water tight at physiologic pressure.  A clear shield was put over the eye before the patient was dismissed from the operating room.  The patient tolerated the procedure well and was in stable condition on the way to the recovery room.        Implant Name Type Inv. Item Serial No.  Lot No. LRB No. Used   EYE IMP IOL ROXY  1-PIECE TECNIS TORIC +22.0 [id number] Lens/Eye Implant EYE IMP IOL ROXY 1-PIECE TECNIS TORIC +22.0 [id number] [facility] OPTIC   Right 1           Duc Richmond MD, [place]

## 2017-05-09 NOTE — OR NURSING
Pred-Gati-Nepaf 1%/0.5%/0.1% ophthalmic solution 2 drops given into the right eye at the end of the case.  Pt supplied medication, no charge.

## 2017-05-12 DIAGNOSIS — I48.91 ATRIAL FIBRILLATION (H): Primary | ICD-10-CM

## 2017-05-15 ENCOUNTER — APPOINTMENT (OUTPATIENT)
Age: 82
Setting detail: DERMATOLOGY
End: 2017-06-03

## 2017-05-15 DIAGNOSIS — L82.1 OTHER SEBORRHEIC KERATOSIS: ICD-10-CM

## 2017-05-15 DIAGNOSIS — D22 MELANOCYTIC NEVI: ICD-10-CM

## 2017-05-15 DIAGNOSIS — L82.0 INFLAMED SEBORRHEIC KERATOSIS: ICD-10-CM

## 2017-05-15 DIAGNOSIS — Z85.828 PERSONAL HISTORY OF OTHER MALIGNANT NEOPLASM OF SKIN: ICD-10-CM

## 2017-05-15 DIAGNOSIS — L98.8 OTHER SPECIFIED DISORDERS OF THE SKIN AND SUBCUTANEOUS TISSUE: ICD-10-CM

## 2017-05-15 DIAGNOSIS — D18.0 HEMANGIOMA: ICD-10-CM

## 2017-05-15 DIAGNOSIS — L81.4 OTHER MELANIN HYPERPIGMENTATION: ICD-10-CM

## 2017-05-15 PROBLEM — C44.612 BASAL CELL CARCINOMA OF SKIN OF RIGHT UPPER LIMB, INCLUDING SHOULDER: Status: ACTIVE | Noted: 2017-05-15

## 2017-05-15 PROBLEM — D22.5 MELANOCYTIC NEVI OF TRUNK: Status: ACTIVE | Noted: 2017-05-15

## 2017-05-15 PROBLEM — D18.01 HEMANGIOMA OF SKIN AND SUBCUTANEOUS TISSUE: Status: ACTIVE | Noted: 2017-05-15

## 2017-05-15 PROCEDURE — 99214 OFFICE O/P EST MOD 30 MIN: CPT | Mod: 25

## 2017-05-15 PROCEDURE — OTHER COUNSELING: OTHER

## 2017-05-15 PROCEDURE — OTHER LIQUID NITROGEN: OTHER

## 2017-05-15 PROCEDURE — OTHER BIOPSY BY SHAVE METHOD: OTHER

## 2017-05-15 PROCEDURE — 11100: CPT | Mod: 59

## 2017-05-15 PROCEDURE — OTHER MIPS QUALITY: OTHER

## 2017-05-15 PROCEDURE — 17110 DESTRUCT B9 LESION 1-14: CPT

## 2017-05-15 ASSESSMENT — LOCATION ZONE DERM
LOCATION ZONE: TRUNK
LOCATION ZONE: ARM
LOCATION ZONE: FACE
LOCATION ZONE: NECK
LOCATION ZONE: LIP

## 2017-05-15 ASSESSMENT — LOCATION SIMPLE DESCRIPTION DERM
LOCATION SIMPLE: RIGHT UPPER BACK
LOCATION SIMPLE: INFERIOR FOREHEAD
LOCATION SIMPLE: LEFT LIP
LOCATION SIMPLE: RIGHT BACK
LOCATION SIMPLE: LEFT UPPER ARM
LOCATION SIMPLE: RIGHT ANTERIOR NECK

## 2017-05-15 ASSESSMENT — LOCATION DETAILED DESCRIPTION DERM
LOCATION DETAILED: RIGHT SUPERIOR UPPER BACK
LOCATION DETAILED: RIGHT MID-UPPER BACK
LOCATION DETAILED: RIGHT INFERIOR LATERAL NECK
LOCATION DETAILED: RIGHT INFERIOR UPPER BACK
LOCATION DETAILED: LEFT ANTERIOR PROXIMAL UPPER ARM
LOCATION DETAILED: LEFT INFERIOR VERMILION LIP
LOCATION DETAILED: INFERIOR MID FOREHEAD
LOCATION DETAILED: RIGHT SUPERIOR LATERAL UPPER BACK

## 2017-05-15 NOTE — PROCEDURE: MIPS QUALITY
Quality 130: Documentation Of Current Medications In The Medical Record: Current Medications Documented
Quality 431: Preventive Care And Screening: Unhealthy Alcohol Use - Screening: Patient screened for unhealthy alcohol use using a single question and scores less than 2 times per year
Quality 265: Biopsy Follow-Up: Biopsy results reviewed, communicated, tracked, and documented
Detail Level: Detailed

## 2017-05-15 NOTE — HPI: SKIN CHECK
What Is The Reason For Today's Visit?: the risk of recurrence, and the development of new lesions
Additional History: She has multiple pigmented lesions distributed throughout the body that she would like checked today. These are asymptomatic, mild in severity, been present for years and have not been treated.

## 2017-05-15 NOTE — PROCEDURE: LIQUID NITROGEN
Pared With?: 15 blade
Total Number Of Lesions Treated: 3
Duration Of Freeze Thaw-Cycle (Seconds): 15
Number Of Freeze-Thaw Cycles: 1 freeze-thaw cycle
Add 52 Modifier (Optional): no
Medical Necessity Clause: This procedure was medically necessary because the lesions that were treated were:
Detail Level: Detailed
Consent: The patient's consent was obtained including but not limited to risks of crusting, scabbing, blistering, scarring, darker or lighter pigmentary change, recurrence, incomplete removal and infection.
Post-Care Instructions: I reviewed with the patient in detail post-care instructions. Patient is to wear sunprotection, and avoid picking at any of the treated lesions. Pt may apply Vaseline to crusted or scabbing areas.
Medical Necessity Information: It is in your best interest to select a reason for this procedure from the list below. All of these items fulfill various CMS LCD requirements except the new and changing color options.

## 2017-05-15 NOTE — PROCEDURE: BIOPSY BY SHAVE METHOD
Cryotherapy Text: The wound bed was treated with cryotherapy after the biopsy was performed.
Anesthesia Volume In Cc (Will Not Render If 0): 0.8
Wound Care: Vaseline
Size Of Lesion In Cm: 2
Consent: Written consent was obtained and risks were reviewed including but not limited to scarring, infection, bleeding, scabbing, incomplete removal, nerve damage and allergy to anesthesia.
Silver Nitrate Text: The wound bed was treated with silver nitrate after the biopsy was performed.
Detail Level: Detailed
Bill For Surgical Tray: no
Electrodesiccation And Curettage Text: The wound bed was treated with electrodesiccation and curettage after the biopsy was performed.
Biopsy Type: H and E
Biopsy Method: Double edge Personna blades
Curettage Text: The wound bed was treated with curettage after the biopsy was performed.
Electrodesiccation Text: The wound bed was treated with electrodesiccation after the biopsy was performed.
Additional Anesthesia Volume In Cc (Will Not Render If 0): 0
Anesthesia Type: 1% lidocaine with epinephrine and a 1:10 solution of 8.4% sodium bicarbonate
Type Of Destruction Used: Curettage
Notification Instructions: Patient will be notified of biopsy results. However, patient instructed to call the office if not contacted within 2 weeks.
Dressing: bandage
Post-Care Instructions: I reviewed with the patient in detail post-care instructions. Patient is to keep the biopsy site dry overnight, and then apply H2O2, Vaseline and a bandage daily until healed.
Hemostasis: Electrocautery
Render Post-Care Instructions In Note?: yes
Billing Type: Third-Party Bill

## 2017-06-02 ENCOUNTER — APPOINTMENT (OUTPATIENT)
Age: 82
Setting detail: DERMATOLOGY
End: 2017-06-03

## 2017-06-02 ENCOUNTER — TRANSFERRED RECORDS (OUTPATIENT)
Dept: HEALTH INFORMATION MANAGEMENT | Facility: CLINIC | Age: 82
End: 2017-06-02

## 2017-06-02 DIAGNOSIS — L82.0 INFLAMED SEBORRHEIC KERATOSIS: ICD-10-CM

## 2017-06-02 PROBLEM — C44.612 BASAL CELL CARCINOMA OF SKIN OF RIGHT UPPER LIMB, INCLUDING SHOULDER: Status: ACTIVE | Noted: 2017-06-02

## 2017-06-02 PROBLEM — D48.5 NEOPLASM OF UNCERTAIN BEHAVIOR OF SKIN: Status: ACTIVE | Noted: 2017-06-02

## 2017-06-02 PROCEDURE — OTHER COUNSELING: OTHER

## 2017-06-02 PROCEDURE — 11100: CPT | Mod: 59

## 2017-06-02 PROCEDURE — OTHER CURETTAGE AND DESTRUCTION: OTHER

## 2017-06-02 PROCEDURE — OTHER MIPS QUALITY: OTHER

## 2017-06-02 PROCEDURE — OTHER BIOPSY BY SHAVE METHOD: OTHER

## 2017-06-02 PROCEDURE — 17263 DSTRJ MAL LES T/A/L 2.1-3.0: CPT

## 2017-06-02 ASSESSMENT — LOCATION DETAILED DESCRIPTION DERM: LOCATION DETAILED: RIGHT SUPERIOR LATERAL UPPER BACK

## 2017-06-02 ASSESSMENT — LOCATION ZONE DERM: LOCATION ZONE: TRUNK

## 2017-06-02 ASSESSMENT — LOCATION SIMPLE DESCRIPTION DERM: LOCATION SIMPLE: RIGHT BACK

## 2017-06-02 NOTE — PROCEDURE: BIOPSY BY SHAVE METHOD
Additional Anesthesia Volume In Cc (Will Not Render If 0): 0
Size Of Lesion In Cm: 0.5
Detail Level: Detailed
Biopsy Method: Double edge Personna blades
Anesthesia Volume In Cc (Will Not Render If 0): 0.8
Type Of Destruction Used: Curettage
Bill 97899 For Specimen Handling/Conveyance To Laboratory?: no
Post-Care Instructions: I reviewed with the patient in detail post-care instructions. Patient is to keep the biopsy site dry overnight, and then apply H2O2, Vaseline and a bandage daily until healed.
Biopsy Type: H and E
Hemostasis: Drysol
Curettage Text: The wound bed was treated with curettage after the biopsy was performed.
Wound Care: Vaseline
Dressing: bandage
Silver Nitrate Text: The wound bed was treated with silver nitrate after the biopsy was performed.
Consent: Written consent was obtained and risks were reviewed including but not limited to scarring, infection, bleeding, scabbing, incomplete removal, nerve damage and allergy to anesthesia.
Electrodesiccation Text: The wound bed was treated with electrodesiccation after the biopsy was performed.
Notification Instructions: Patient will be notified of biopsy results. However, patient instructed to call the office if not contacted within 2 weeks.
Electrodesiccation And Curettage Text: The wound bed was treated with electrodesiccation and curettage after the biopsy was performed.
Body Location Override (Optional - Billing Will Still Be Based On Selected Body Map Location If Applicable): Right posterior shoulder
Billing Type: Third-Party Bill
Render Post-Care Instructions In Note?: yes
Anesthesia Type: 1% lidocaine with epinephrine and a 1:10 solution of 8.4% sodium bicarbonate
Cryotherapy Text: The wound bed was treated with cryotherapy after the biopsy was performed.

## 2017-06-02 NOTE — PROCEDURE: CURETTAGE AND DESTRUCTION
Bill For Surgical Tray: no
Cautery Type: electrodesiccation
Render Post-Care Instructions In Note?: yes
What Was Performed First?: Destruction
Body Location Override (Optional - Billing Will Still Be Based On Selected Body Map Location If Applicable): Right anterior shoulder
Post-Care Instructions: I reviewed with the patient in detail post-care instructions. Patient is to keep the area dry for 48 hours, and not to engage in any swimming until the area is healed. Should the patient develop any fevers, chills, bleeding, severe pain patient will contact the office immediately.
Consent was obtained from the patient. The risks, benefits and alternatives to therapy were discussed in detail. Specifically, the risks of infection, scarring, bleeding, prolonged wound healing, nerve injury, incomplete removal, allergy to anesthesia and recurrence were addressed. Alternatives to ED&C, such as: surgical removal and XRT were also discussed.  Prior to the procedure, the treatment site was clearly identified and confirmed by the patient. All components of Universal Protocol/PAUSE Rule completed.
Anesthesia Type: 1% lidocaine with epinephrine and a 1:10 solution of 8.4% sodium bicarbonate
Detail Level: Detailed
Size Of Lesion In Cm: 1.9
Additional Information: (Optional): The wound was cleaned, and a vaseline and a pressure dressing was applied.  The patient received detailed post-op instructions.
Size Of Lesion After Curettage: 2.5
Number Of Curettages: 3
Anesthesia Volume In Cc: 1
Bill As A Line Item Or As Units: Line Item

## 2017-06-02 NOTE — PROCEDURE: MIPS QUALITY
Detail Level: Detailed
Quality 143: Oncology: Medical And Radiation- Pain Intensity Quantified: Pain severity quantified, no pain present
Quality 265: Biopsy Follow-Up: Biopsy results reviewed, communicated, tracked, and documented
Quality 110: Preventive Care And Screening: Influenza Immunization: Influenza Immunization previously received during influenza season
Quality 130: Documentation Of Current Medications In The Medical Record: Current Medications Documented
Quality 431: Preventive Care And Screening: Unhealthy Alcohol Use - Screening: Patient screened for unhealthy alcohol use using a single question and scores less than 2 times per year
Quality 226: Preventive Care And Screening: Tobacco Use: Screening And Cessation Intervention: Patient screened for tobacco and is a smoker AND received Cessation Counseling

## 2017-06-24 DIAGNOSIS — M54.9 INTRACTABLE BACK PAIN: ICD-10-CM

## 2017-06-26 RX ORDER — TRAMADOL HYDROCHLORIDE 50 MG/1
50 TABLET ORAL EVERY 6 HOURS PRN
Qty: 100 TABLET | Refills: 1 | Status: ON HOLD | OUTPATIENT
Start: 2017-06-26 | End: 2018-01-04

## 2017-06-26 NOTE — TELEPHONE ENCOUNTER
Controlled Substance Refill Request for traMADol (ULTRAM) 50 MG tablet    Problem List Complete:  Yes    Last Written Prescription Date:  2/1/2017  Last Fill Quantity: 100,   # refills: 1    Last Office Visit with G primary care provider: 4/25/2017    Clinic visit frequency required: Q 6  months     Future Office visit:     Controlled substance agreement on file: Yes:  Date 10/5/2016.     Processing:  Fax Rx to [facility] pharmacy   checked in past 6 months?  Yes 2/1/2017

## 2017-06-26 NOTE — TELEPHONE ENCOUNTER
Faxed rx to    [id number] - [place], [place] - [address]

## 2017-07-07 ENCOUNTER — ALLIED HEALTH/NURSE VISIT (OUTPATIENT)
Dept: CARDIOLOGY | Facility: CLINIC | Age: 82
End: 2017-07-07
Payer: MEDICARE

## 2017-07-07 DIAGNOSIS — Z95.0 CARDIAC PACEMAKER IN SITU: Primary | ICD-10-CM

## 2017-07-07 PROCEDURE — 93279 PRGRMG DEV EVAL PM/LDLS PM: CPT | Performed by: INTERNAL MEDICINE

## 2017-07-07 NOTE — MR AVS SNAPSHOT
After Visit Summary   7/7/2017    Helene Gibbs    MRN: [id number]           Patient Information     Date Of Birth          1935        Visit Information        Provider Department      7/7/2017 1:45 PM CHASE YUNGN [facility]        Today's Diagnoses     Cardiac pacemaker in situ    -  1       Follow-ups after your visit        Your next 10 appointments already scheduled     Oct 10, 2017  1:15 PM CDT   Teletrace with STONE TECH1   [facility] ([facility])    [address] [place] [address] [phone]              Who to contact     If you have questions or need follow up information about today's clinic visit or your schedule please contact [facility] directly at [phone].  Normal or non-critical lab and imaging results will be communicated to you by MyChart, letter or phone within 4 business days after the clinic has received the results. If you do not hear from us within 7 days, please contact the clinic through MyChart or phone. If you have a critical or abnormal lab result, we will notify you by phone as soon as possible.  Submit refill requests through CelebCalls or call your pharmacy and they will forward the refill request to us. Please allow 3 business days for your refill to be completed.          Additional Information About Your Visit        Care EveryWhere ID     This is your Care EveryWhere ID. This could be used by other organizations to access your [facility] medical records  [id number]        Your Vitals Were     Last Period                   (LMP Unknown)            Blood Pressure from Last 3 Encounters:   05/09/17 130/68   04/25/17 126/70   02/14/17 134/62    Weight from Last 3 Encounters:   04/25/17 69.9 kg (154 lb 1.6 oz)   02/14/17 70.4 kg (155 lb 1.6 oz)   12/20/16 70.8 kg (156 lb 1.6 oz)              We Performed the  Following     PM DEVICE PROGRAMMING EVAL, SINGLE LEAD PACER ([id number])        Primary Care Provider Office Phone # Fax #    Neisha MARIMAR Esparza [phone]       [facility] [place]    [address]  [place]                [place] [address]        Goals        General    start date: 4/17/14 I will weigh myself daily and if any weight gain or shortness of breath I will call the clinic. (pt-stated)     Notes - Note created  4/17/2014  3:59 PM by Margareth Pichardo, RN    As of today's date 4/17/2014 goal is met at 0 - 25%.   Goal Status:  Active        Equal Access to Services     TONG TUCKER : Hadii aad ku hadjewell Brady, waaxda luqadaha, qaybta kaalmada rachelyada, pamela mccullough . So [facility] [phone].    ATENCIÓN: Si habla español, tiene a gupta disposición servicios gratuitos de asistencia lingüística. Llame al [phone].    We comply with applicable federal civil rights laws and [place] laws. We do not discriminate on the basis of race, color, national origin, age, disability sex, sexual orientation or gender identity.            Thank you!     Thank you for choosing [facility] [place]  for your care. Our goal is always to provide you with excellent care. Hearing back from our patients is one way we can continue to improve our services. Please take a few minutes to complete the written survey that you may receive in the mail after your visit with us. Thank you!             Your Updated Medication List - Protect others around you: Learn how to safely use, store and throw away your medicines at www.disposemymeds.org.          This list is accurate as of: 7/7/17  2:08 PM.  Always use your most recent med list.                   Brand Name Dispense Instructions for use Diagnosis    ACCU-CHEK SMARTVIEW test strip   Generic drug:  blood glucose monitoring     100 each    1 strip by In Vitro route 2 times daily Or as directed    Type 2  diabetes mellitus with diabetic nephropathy (H)       albuterol 108 (90 BASE) MCG/ACT Inhaler    PROAIR HFA/PROVENTIL HFA/VENTOLIN HFA    1 Inhaler    Inhale 2 puffs into the lungs every 4 hours as needed for shortness of breath / dyspnea or wheezing    COPD (chronic obstructive pulmonary disease) (H)       apixaban ANTICOAGULANT 5 MG tablet    ELIQUIS    180 tablet    Take 1 tablet (5 mg) by mouth 2 times daily    Atrial fibrillation (H)       betamethasone valerate 0.1 % cream    VALISONE    15 g    Apply topically 2 times daily Use sparingly    Rash       blood glucose monitoring lancets     1 Box    USE TO TEST BLOOD SUGAR TWO TIMES A DAY OR AS DIRECTED    Type 2 diabetes mellitus with diabetic nephropathy (H)       COMPOUND - PHARMACY TO MIX COMPOUNDED MEDICATION    CMPD RX    42 suppository    Vaginal valium- 10 mg suppository    Vaginal pain, Pelvic floor tension, Urinary retention       fluocinolone 0.01 % solution    SYNALAR          fluticasone 50 MCG/ACT spray    FLONASE    16 mL    INHALE 1 TO 2 SPRAYS INTO BOTH NOSTRILS DAILY    Rhinitis, unspecified type       furosemide 20 MG tablet    LASIX     Take 20 mg by mouth daily Repeat every aftenoon prn weight gain of 2 lbs and or 2+ edema lower extremities        glipiZIDE 5 MG tablet    GLUCOTROL    135 tablet    TAKE 1 TABLET BY MOUTH EVERY MORNING AND 0.5 TABLET EVERY EVENING    Type 2 diabetes mellitus with diabetic nephropathy, without long-term current use of insulin (H)       hydrocortisone 0.5 % cream      Apply topically 3 times daily as needed for itching (on arms) SEND HOME WITH PATIENT        * hydrocortisone 2.5 % cream    ANUSOL-HC    28 g    Place rectally 2 times daily as needed for hemorrhoids    External hemorrhoids       * PROCTOSOL HC 2.5 % cream   Generic drug:  hydrocortisone     28.35 g    PLACE RECTALLY 2 TIMES DAILY AS NEEDED FOR HEMORRHOIDS    Hemorrhoid       * PROCTOSOL HC 2.5 % cream   Generic drug:  hydrocortisone     30 g     PLACE RECTALLY 2 TIMES DAILY AS NEEDED FOR HEMORRHOIDS    External hemorrhoids       hydrOXYzine 25 MG capsule    VISTARIL    180 capsule    Take 1 capsule (25 mg) by mouth 2 times daily as needed for itching    Itching       lisinopril 5 MG tablet    PRINIVIL/ZESTRIL    90 tablet    TAKE 1 TABLET (5 MG) BY MOUTH DAILY    Essential hypertension       magnesium hydroxide 400 MG/5ML suspension    MILK OF MAGNESIA     Take 30 mLs by mouth At Bedtime        nystatin 339697 UNIT/GM Powd    MYCOSTATIN    60 g    Apply topically 2 times daily as needed    Rash       ondansetron 4 MG ODT tab    ZOFRAN ODT    30 tablet    Take 1 tablet (4 mg) by mouth every 6 hours as needed for nausea    Nausea       order for DME     1 each    Equipment being ordered: AccuChek Smart View strips Patient tests twice daily.    Type II or unspecified type diabetes mellitus without mention of complication, not stated as uncontrolled       PRESERVISION/LUTEIN Caps      Take 1 capsule by mouth 2 times daily        senna-docusate 8.6-50 MG per tablet    SENOKOT-S;PERICOLACE     Take 2 tablets by mouth 2 times daily        simvastatin 10 MG tablet    ZOCOR    90 tablet    Take 1 tablet (10 mg) by mouth At Bedtime    Type 2 diabetes mellitus with diabetic nephropathy, without long-term current use of insulin (H)       SPIRIVA HANDIHALER IN           traMADol 50 MG tablet    ULTRAM    100 tablet    Take 1 tablet (50 mg) by mouth every 6 hours as needed for moderate pain    Intractable back pain       * Notice:  This list has 3 medication(s) that are the same as other medications prescribed for you. Read the directions carefully, and ask your doctor or other care provider to review them with you.

## 2017-07-07 NOTE — PROGRESS NOTES
[facility] [employer] (S) Pacemaker Device Check  AP: NA % : 100 %  Mode: VVIR Lower rate 70 bpm        Underlying Rhythm: Chronic AF with CHB achieved by miriam AVNA with a junctional escape at VVI 30  Heart Rate: Heart rate stable with adequate variability. Patient walks slowly with walker.  Sensing: WNL    Pacing Threshold: WNL   Impedance: WNL  Battery Status: Approximately 5.5 years longevity.  Atrial Arrhythmia: Chronic AF, patient is on Eliquis.  Ventricular Arrhythmia: 0  Setting Change: Ventricular sensitivity changed from 2.0mV to 1.5mv for R waves of 3.2 mV    Care Plan: Follow up with Q 3 month office Teletrace. BRENT Newsome RN

## 2017-07-10 RX ORDER — DIAZEPAM 2.5 MG/.5ML
GEL RECTAL
Status: CANCELLED | OUTPATIENT
Start: 2017-07-10

## 2017-07-19 ENCOUNTER — TELEPHONE (OUTPATIENT)
Dept: UROLOGY | Facility: CLINIC | Age: 82
End: 2017-07-19

## 2017-07-19 NOTE — TELEPHONE ENCOUNTER
diaze suppository  #42  Take one the night before physical therapy. Seble Woodard LPN Staff Nurse

## 2017-07-21 ENCOUNTER — TELEPHONE (OUTPATIENT)
Dept: FAMILY MEDICINE | Facility: CLINIC | Age: 82
End: 2017-07-21

## 2017-07-21 NOTE — TELEPHONE ENCOUNTER
Reason for call:  Patient reporting a symptom    Symptom or request: pt. Has severe pain in left shoulder.  It extends down from neck into shoulder.  Also having muscle spasms.  It is very uncomfortable.    Duration (how long have symptoms been present): @ two weeks    Have you been treated for this before? No    Additional comments: pt. Has been taking Tylenol and Tramadol, but pain is horrible if not taking.    Phone Number patient can be reached at:  Home number on file [phone] (home)    Best Time:  ASAP    Can we leave a detailed message on this number:  YES    Call taken on 7/21/2017 at 9:27 AM by Alysa Carrizales  .

## 2017-07-21 NOTE — TELEPHONE ENCOUNTER
To PCP:  Please see below message.  Are you able to see patient today?  Please advise.  Thank you.  Holly Benavidez RN

## 2017-07-21 NOTE — TELEPHONE ENCOUNTER
Schedule team appointment or urgent care  I am not feeling well today so cannot add patient   Dr.Nasima Vilma MD

## 2017-07-26 ENCOUNTER — THERAPY VISIT (OUTPATIENT)
Dept: PHYSICAL THERAPY | Facility: CLINIC | Age: 82
End: 2017-07-26
Payer: MEDICARE

## 2017-07-26 DIAGNOSIS — M25.512 SHOULDER PAIN, LEFT: Primary | ICD-10-CM

## 2017-07-26 PROCEDURE — 97162 PT EVAL MOD COMPLEX 30 MIN: CPT | Mod: GP | Performed by: PHYSICAL THERAPIST

## 2017-07-26 PROCEDURE — 97140 MANUAL THERAPY 1/> REGIONS: CPT | Mod: GP | Performed by: PHYSICAL THERAPIST

## 2017-07-26 PROCEDURE — G8984 CARRY CURRENT STATUS: HCPCS | Mod: GP | Performed by: PHYSICAL THERAPIST

## 2017-07-26 PROCEDURE — 97110 THERAPEUTIC EXERCISES: CPT | Mod: GP | Performed by: PHYSICAL THERAPIST

## 2017-07-26 PROCEDURE — G8985 CARRY GOAL STATUS: HCPCS | Mod: GP | Performed by: PHYSICAL THERAPIST

## 2017-07-26 NOTE — MR AVS SNAPSHOT
After Visit Summary   7/26/2017    Helene Gibbs    MRN: [id number]           Patient Information     Date Of Birth          1935        Visit Information        Provider Department      7/26/2017 1:30 PM Maricruz Bowen, PT [facility] [place] [facility]        Today's Diagnoses     Shoulder pain, left    -  1       Follow-ups after your visit        Your next 10 appointments already scheduled     Aug 03, 2017 12:50 PM CDT   MICHAELLE Extremity with Maricruz Bowen PT   [facility] (MICHAELLE [place]  )    [address] [place] [address] [address]  [facility] [address]   [phone]            Aug 07, 2017  1:00 PM CDT   MICHAELLE Extremity with Maricruz Bowen PT   [facility] (MICHAELLE [place]  )    [address] [place] [address] [address]  [facility] [id number] [phone]            Aug 14, 2017  2:50 PM CDT   MICHAELLE Extremity with Maricruz Bowen PT   [facility] (MICHAELLE [place]  )    [address] [place] [address] [address]  [facility] [id number] [phone]            Oct 10, 2017  1:15 PM CDT   Teletrace with CHASE KILGORE   [facility] ([facility])    [address]  [facility] [id number] [phone]              Who to contact     If you have questions or need follow up information about today's clinic visit or your schedule please contact [facility] [place] [facility] directly at [phone].  Normal or non-critical lab and imaging results will be communicated to you by MyChart, letter or phone within 4 business days after the clinic has received the results. If you do not hear from us within 7 days, please contact the clinic through MyChart or phone. If you have a critical or abnormal lab result, we will notify you by phone as soon as possible.  Submit refill requests through Floobitst or call your  "pharmacy and they will forward the refill request to us. Please allow 3 business days for your refill to be completed.          Additional Information About Your Visit        MyChart Information     ZinMobihart lets you send messages to your doctor, view your test results, renew your prescriptions, schedule appointments and more. To sign up, go to [email or web address] . Click on \"Log in\" on the left side of the screen, which will take you to the Welcome page. Then click on \"Sign up Now\" on the right side of the page.     You will be asked to enter the access code listed below, as well as some personal information. Please follow the directions to create your username and password.     Your access code is: [email or web address]  Expires: 10/24/2017  5:07 PM     Your access code will  in 90 days. If you need help or a new code, please call your [place] clinic or [phone].        Care EveryWhere ID     This is your Care EveryWhere ID. This could be used by other organizations to access your [place] medical records  [id number]        Your Vitals Were     Last Period                   (LMP Unknown)            Blood Pressure from Last 3 Encounters:   17 130/68   17 126/70   17 134/62    Weight from Last 3 Encounters:   17 69.9 kg (154 lb 1.6 oz)   17 70.4 kg (155 lb 1.6 oz)   16 70.8 kg (156 lb 1.6 oz)              We Performed the Following     HC PT EVAL, MODERATE COMPLEXITY     MICHAELLE CERT REPORT     MICHAELLE INITIAL EVAL REPORT     MANUAL THER TECH,1+REGIONS,EA 15 MIN     THERAPEUTIC EXERCISES        Primary Care Provider Office Phone # Fax #    Neisha Esparza [phone]       [facility] [place]    [address]  [place] [address]        Goals        General    start date: 14 I will weigh myself daily and if any weight gain or shortness of breath I will call the clinic. (pt-stated)     Notes - Note created  2014  3:59 PM by " Margareth Pichardo, RN    As of today's date 4/17/2014 goal is met at 0 - 25%.   Goal Status:  Active        Equal Access to Services     TONG TUCKER : Yariel sukhdeep mercer no Brady, jefe chaselizzette, maribell castañeda, pamela shethkristina . So [facility] [phone].    ATENCIÓN: Si habla español, tiene a gupta disposición servicios gratuitos de asistencia lingüística. Llame al [phone].    We comply with applicable federal civil rights laws and [place] laws. We do not discriminate on the basis of race, color, national origin, age, disability sex, sexual orientation or gender identity.            Thank you!     Thank you for choosing [facility]  for your care. Our goal is always to provide you with excellent care. Hearing back from our patients is one way we can continue to improve our services. Please take a few minutes to complete the written survey that you may receive in the mail after your visit with us. Thank you!             Your Updated Medication List - Protect others around you: Learn how to safely use, store and throw away your medicines at www.disposemymeds.org.          This list is accurate as of: 7/26/17  5:07 PM.  Always use your most recent med list.                   Brand Name Dispense Instructions for use Diagnosis    ACCU-CHEK SMARTVIEW test strip   Generic drug:  blood glucose monitoring     100 each    1 strip by In Vitro route 2 times daily Or as directed    Type 2 diabetes mellitus with diabetic nephropathy (H)       albuterol 108 (90 BASE) MCG/ACT Inhaler    PROAIR HFA/PROVENTIL HFA/VENTOLIN HFA    1 Inhaler    Inhale 2 puffs into the lungs every 4 hours as needed for shortness of breath / dyspnea or wheezing    COPD (chronic obstructive pulmonary disease) (H)       apixaban ANTICOAGULANT 5 MG tablet    ELIQUIS    180 tablet    Take 1 tablet (5 mg) by mouth 2 times daily    Atrial fibrillation (H)        betamethasone valerate 0.1 % cream    VALISONE    15 g    Apply topically 2 times daily Use sparingly    Rash       blood glucose monitoring lancets     1 Box    USE TO TEST BLOOD SUGAR TWO TIMES A DAY OR AS DIRECTED    Type 2 diabetes mellitus with diabetic nephropathy (H)       COMPOUNDED NON-CONTROLLED SUBSTANCE - PHARMACY TO MIX COMPOUNDED MEDICATION    CMPD RX    42 suppository    Vaginal valium- 10 mg suppository    Vaginal pain, Pelvic floor tension, Urinary retention       fluocinolone 0.01 % solution    SYNALAR          fluticasone 50 MCG/ACT spray    FLONASE    16 mL    INHALE 1 TO 2 SPRAYS INTO BOTH NOSTRILS DAILY    Rhinitis, unspecified type       furosemide 20 MG tablet    LASIX     Take 20 mg by mouth daily Repeat every aftenoon prn weight gain of 2 lbs and or 2+ edema lower extremities        glipiZIDE 5 MG tablet    GLUCOTROL    135 tablet    TAKE 1 TABLET BY MOUTH EVERY MORNING AND 0.5 TABLET EVERY EVENING    Type 2 diabetes mellitus with diabetic nephropathy, without long-term current use of insulin (H)       hydrocortisone 0.5 % cream      Apply topically 3 times daily as needed for itching (on arms) SEND HOME WITH PATIENT        * hydrocortisone 2.5 % cream    ANUSOL-HC    28 g    Place rectally 2 times daily as needed for hemorrhoids    External hemorrhoids       * PROCTOSOL HC 2.5 % cream   Generic drug:  hydrocortisone     28.35 g    PLACE RECTALLY 2 TIMES DAILY AS NEEDED FOR HEMORRHOIDS    Hemorrhoid       * PROCTOSOL HC 2.5 % cream   Generic drug:  hydrocortisone     30 g    PLACE RECTALLY 2 TIMES DAILY AS NEEDED FOR HEMORRHOIDS    External hemorrhoids       hydrOXYzine 25 MG capsule    VISTARIL    180 capsule    Take 1 capsule (25 mg) by mouth 2 times daily as needed for itching    Itching       lisinopril 5 MG tablet    PRINIVIL/ZESTRIL    90 tablet    TAKE 1 TABLET (5 MG) BY MOUTH DAILY    Essential hypertension       magnesium hydroxide 400 MG/5ML suspension    MILK OF MAGNESIA     Take  30 mLs by mouth At Bedtime        nystatin [id number] UNIT/GM Powd    MYCOSTATIN    60 g    Apply topically 2 times daily as needed    Rash       ondansetron 4 MG ODT tab    ZOFRAN ODT    30 tablet    Take 1 tablet (4 mg) by mouth every 6 hours as needed for nausea    Nausea       order for DME     1 each    Equipment being ordered: AccuChek Smart View strips Patient tests twice daily.    Type II or unspecified type diabetes mellitus without mention of complication, not stated as uncontrolled       PRESERVISION/LUTEIN Caps      Take 1 capsule by mouth 2 times daily        senna-docusate 8.6-50 MG per tablet    SENOKOT-S;PERICOLACE     Take 2 tablets by mouth 2 times daily        simvastatin 10 MG tablet    ZOCOR    90 tablet    Take 1 tablet (10 mg) by mouth At Bedtime    Type 2 diabetes mellitus with diabetic nephropathy, without long-term current use of insulin (H)       SPIRIVA HANDIHALER IN           traMADol 50 MG tablet    ULTRAM    100 tablet    Take 1 tablet (50 mg) by mouth every 6 hours as needed for moderate pain    Intractable back pain       * Notice:  This list has 3 medication(s) that are the same as other medications prescribed for you. Read the directions carefully, and ask your doctor or other care provider to review them with you.

## 2017-07-26 NOTE — LETTER
DEPARTMENT OF HEALTH AND HUMAN SERVICES  CENTERS FOR MEDICARE & MEDICAID SERVICES    PLAN/UPDATED PLAN OF PROGRESS FOR OUTPATIENT REHABILITATION  PATIENTS NAME:  Helene Gibbs   : 1935  [facility] NUMBER:    [id number]  [id number]  PROVIDER NAME: [place] [facility] PHYSICAL [facility]ERAPY  MEDICAL RECORD NUMBER: [id number]   START OF CARE DATE:  SOC Date: 17   TYPE:  PT  PRIMARY/TREATMENT DIAGNOSIS: (Pertinent Medical Diagnosis)  Shoulder pain, left  VISITS FROM START OF CARE: 1  Rxs Used: 1   [place] [facility] Initial Evaluation  Subjective:  Patient is a 82 year old female presenting with rehab left shoulder hpi. The history is provided by the patient. Helene Gibbs is a 82 year old female with a left shoulder condition.  Condition occurred with:  Lifting and contact with object.  Condition occurred: at home.  This is a new condition  2017 onset after pushing heavy door  2017 injection. 2017 still in pain- ultrasound guided injection and pain medication. Smhx: Left RTC surgery sev yrs ago; thoracic compression fractures (stable).  Patient reports pain:  In the joint.  Radiates to:  Shoulder.  Pain is described as aching and sharp and is constant and reported as 7/10.  Associated symptoms:  Other. Pain is the same all the time.  Symptoms are exacerbated by using arm overhead, certain positions, using arm behind back and lifting and relieved by nothing.  Since onset symptoms are gradually improving.    Previous treatment includes physical therapy and surgery.  There was moderate improvement following previous treatment.  General health as reported by patient is fair.                Objective:  Standing Alignment:    Cervical/Thoracic:  Forward head and thoracic kyphosis increased (severe kyphosis)  Shoulder Evaluation:  ROM:  AROM:    Flexion:  Left:  90 PDM      Abduction:  Left: 60 PDM     Extension/Internal Rotation:  Left:  Iliac crest PDM         PROM:    Flexion:  Left:  160        Internal Rotation:  Left:  45      External Rotation:  Left:  85      Pain: pain during movement  Endfeel: empty  Strength:  not assessed (active range indicates at least 3+/5; NT today due to pain, post injection)  Special Tests:  not assessed  Assessment/Plan:    Patient is a 82 year old female with left side shoulder complaints.    Patient has the following significant findings with corresponding treatment plan.                Diagnosis 1:  L shoulder pain  Pain -  manual therapy, self management, education and home program  Decreased ROM/flexibility - manual therapy and therapeutic exercise  Decreased strength - therapeutic exercise and therapeutic activities  PATIENTS NAME:  Helene Gibbs   : 1935    Impaired muscle performance - neuro re-education  Decreased function - therapeutic activities  Impaired posture - neuro re-education  Therapy Evaluation Codes:   1) History comprised of:   Personal factors that impact the plan of care:      Age.    Comorbidity factors that impact the plan of care are:      Osteoarthritis and Smoking.     Medications impacting care: Pain.  2) Examination of Body Systems comprised of:   Body structures and functions that impact the plan of care:      Shoulder.   Activity limitations that impact the plan of care are:      Lifting.  3) Clinical presentation characteristics are:   Evolving/Changing.  4) Decision-Making    Moderate complexity using standardized patient assessment instrument and/or measureable assessment of functional outcome.  Cumulative Therapy Evaluation is: Moderate complexity.  Previous and current functional limitations:  (See Goal Flow Sheet for this information)    Short term and Long term goals: (See Goal Flow Sheet for this information)   Communication ability:  Patient appears to be able to clearly communicate and understand verbal and written communication and follow directions correctly.  Treatment Explanation  "- The following has been discussed with the patient:   RX ordered/plan of care  Anticipated outcomes  Possible risks and side effects  This patient would benefit from PT intervention to resume normal activities.   Rehab potential is good.  Frequency:  1 X week, once daily  Duration:  for 12 weeks  Discharge Plan:  Achieve all LTG.  Independent in home treatment program.  Reach maximal therapeutic benefit.      Caregiver Signature/Credentials _____________________________ Date ________       Treating Provider: Maricruz Bowen, PT   I have reviewed and certified the need for these services and plan of treatment while under my care.          PHYSICIAN'S SIGNATURE:   _____________________________________  Date___________    Caden Wilhelm        PATIENTS NAME:  Helene Gibbs   : 1935    Certification period:  Beginning of Cert date period: 17 to  End of Cert period date: 10/23/17     Functional Level Progress Report: Please see attached \"Goal Flow sheet for Functional level.\"    ____X____ Continue Services or       ________ DC Services                Service dates: From  SOC Date: 17 date to present                         "

## 2017-07-26 NOTE — PROGRESS NOTES
[place] [facility] Initial Evaluation    Subjective:    Patient is a 82 year old female presenting with rehab left shoulder hpi. The history is provided by the patient.   Helene Gibbs is a 82 year old female with a left shoulder condition.  Condition occurred with:  Lifting and contact with object.  Condition occurred: at home.  This is a new condition  7/18/2017 onset after pushing heavy door  7/22/2017 injection  7/25/2017 still in pain- ultrasound guided injection and pain medication  Smhx: Left RTC surgery sev yrs ago; thoracic compression fractures (stable).    Patient reports pain:  In the joint.  Radiates to:  Shoulder.  Pain is described as aching and sharp and is constant and reported as 7/10.  Associated symptoms:  Other. Pain is the same all the time.  Symptoms are exacerbated by using arm overhead, certain positions, using arm behind back and lifting and relieved by nothing.  Since onset symptoms are gradually improving.    Previous treatment includes physical therapy and surgery.  There was moderate improvement following previous treatment.  General health as reported by patient is fair.                                              Objective:    Standing Alignment:    Cervical/Thoracic:  Forward head and thoracic kyphosis increased (severe kyphosis)                                         Shoulder Evaluation:  ROM:  AROM:    Flexion:  Left:  90 PDM        Abduction:  Left: 60 PDM                     Extension/Internal Rotation:  Left:  Iliac crest PDM        PROM:    Flexion:  Left:  160              Internal Rotation:  Left:  45      External Rotation:  Left:  85                    Pain: pain during movement  Endfeel: empty  Strength:  not assessed (active range indicates at least 3+/5; NT today due to pain, post injection)                        Special Tests:  not assessed                                           General     ROS    Assessment/Plan:      Patient is a 82 year old  female with left side shoulder complaints.    Patient has the following significant findings with corresponding treatment plan.                Diagnosis 1:  L shoulder pain  Pain -  manual therapy, self management, education and home program  Decreased ROM/flexibility - manual therapy and therapeutic exercise  Decreased strength - therapeutic exercise and therapeutic activities  Impaired muscle performance - neuro re-education  Decreased function - therapeutic activities  Impaired posture - neuro re-education    Therapy Evaluation Codes:   1) History comprised of:   Personal factors that impact the plan of care:      Age.    Comorbidity factors that impact the plan of care are:      Osteoarthritis and Smoking.     Medications impacting care: Pain.  2) Examination of Body Systems comprised of:   Body structures and functions that impact the plan of care:      Shoulder.   Activity limitations that impact the plan of care are:      Lifting.  3) Clinical presentation characteristics are:   Evolving/Changing.  4) Decision-Making    Moderate complexity using standardized patient assessment instrument and/or measureable assessment of functional outcome.  Cumulative Therapy Evaluation is: Moderate complexity.    Previous and current functional limitations:  (See Goal Flow Sheet for this information)    Short term and Long term goals: (See Goal Flow Sheet for this information)     Communication ability:  Patient appears to be able to clearly communicate and understand verbal and written communication and follow directions correctly.  Treatment Explanation - The following has been discussed with the patient:   RX ordered/plan of care  Anticipated outcomes  Possible risks and side effects  This patient would benefit from PT intervention to resume normal activities.   Rehab potential is good.    Frequency:  1 X week, once daily  Duration:  for 12 weeks  Discharge Plan:  Achieve all LTG.  Independent in home treatment  program.  Reach maximal therapeutic benefit.    Please refer to the daily flowsheet for treatment today, total treatment time and time spent performing 1:1 timed codes.

## 2017-07-31 NOTE — PROGRESS NOTES
Subjective:    Patient is a 82 year old female presenting with rehab left ankle/foot hpi.                                      Pertinent medical history includes:  Osteoarthritis, osteoporosis, cancer, diabetes, high blood pressure and implanted device.  Medical allergies: yes.  Other surgeries include:  None reported.    Current occupation is Retired .    Primary job tasks include:  Prolonged sitting.    Barriers include:  None as reported by patient.    Red flags:  None as reported by patient.                        Objective:    System    Physical Exam    General     ROS    Assessment/Plan:

## 2017-08-03 ENCOUNTER — THERAPY VISIT (OUTPATIENT)
Dept: PHYSICAL THERAPY | Facility: CLINIC | Age: 82
End: 2017-08-03
Payer: MEDICARE

## 2017-08-03 DIAGNOSIS — M25.512 SHOULDER PAIN, LEFT: ICD-10-CM

## 2017-08-03 PROCEDURE — 97112 NEUROMUSCULAR REEDUCATION: CPT | Mod: GP | Performed by: PHYSICAL THERAPIST

## 2017-08-03 PROCEDURE — 97110 THERAPEUTIC EXERCISES: CPT | Mod: GP | Performed by: PHYSICAL THERAPIST

## 2017-08-03 PROCEDURE — 97140 MANUAL THERAPY 1/> REGIONS: CPT | Mod: GP | Performed by: PHYSICAL THERAPIST

## 2017-08-07 ENCOUNTER — THERAPY VISIT (OUTPATIENT)
Dept: PHYSICAL THERAPY | Facility: CLINIC | Age: 82
End: 2017-08-07
Payer: MEDICARE

## 2017-08-07 ENCOUNTER — APPOINTMENT (OUTPATIENT)
Age: 82
Setting detail: DERMATOLOGY
End: 2017-08-20

## 2017-08-07 DIAGNOSIS — Z85.828 PERSONAL HISTORY OF OTHER MALIGNANT NEOPLASM OF SKIN: ICD-10-CM

## 2017-08-07 DIAGNOSIS — D18.0 HEMANGIOMA: ICD-10-CM

## 2017-08-07 DIAGNOSIS — L82.0 INFLAMED SEBORRHEIC KERATOSIS: ICD-10-CM

## 2017-08-07 DIAGNOSIS — L98.8 OTHER SPECIFIED DISORDERS OF THE SKIN AND SUBCUTANEOUS TISSUE: ICD-10-CM

## 2017-08-07 DIAGNOSIS — M25.512 SHOULDER PAIN, LEFT: ICD-10-CM

## 2017-08-07 PROBLEM — D18.01 HEMANGIOMA OF SKIN AND SUBCUTANEOUS TISSUE: Status: ACTIVE | Noted: 2017-08-07

## 2017-08-07 PROCEDURE — OTHER LIQUID NITROGEN: OTHER

## 2017-08-07 PROCEDURE — OTHER COUNSELING: OTHER

## 2017-08-07 PROCEDURE — OTHER MIPS QUALITY: OTHER

## 2017-08-07 PROCEDURE — OTHER PULSED-DYE LASER: OTHER

## 2017-08-07 PROCEDURE — 17110 DESTRUCT B9 LESION 1-14: CPT

## 2017-08-07 PROCEDURE — 97110 THERAPEUTIC EXERCISES: CPT | Mod: GP | Performed by: PHYSICAL THERAPIST

## 2017-08-07 PROCEDURE — 97140 MANUAL THERAPY 1/> REGIONS: CPT | Mod: GP | Performed by: PHYSICAL THERAPIST

## 2017-08-07 PROCEDURE — 99213 OFFICE O/P EST LOW 20 MIN: CPT | Mod: 25

## 2017-08-07 ASSESSMENT — LOCATION DETAILED DESCRIPTION DERM
LOCATION DETAILED: LEFT ANTERIOR SHOULDER
LOCATION DETAILED: LEFT CLAVICULAR SKIN
LOCATION DETAILED: LEFT INFERIOR LATERAL NECK
LOCATION DETAILED: RIGHT LATERAL SUPERIOR CHEST
LOCATION DETAILED: LEFT CLAVICULAR NECK

## 2017-08-07 ASSESSMENT — LOCATION ZONE DERM
LOCATION ZONE: NECK
LOCATION ZONE: TRUNK
LOCATION ZONE: ARM

## 2017-08-07 ASSESSMENT — LOCATION SIMPLE DESCRIPTION DERM
LOCATION SIMPLE: LEFT SHOULDER
LOCATION SIMPLE: LEFT CLAVICULAR SKIN
LOCATION SIMPLE: CHEST
LOCATION SIMPLE: LEFT ANTERIOR NECK

## 2017-08-07 NOTE — PROCEDURE: LIQUID NITROGEN
Detail Level: Simple
Medical Necessity Information: It is in your best interest to select a reason for this procedure from the list below. All of these items fulfill various CMS LCD requirements except the new and changing color options.
Include Z78.9 (Other Specified Conditions Influencing Health Status) As An Associated Diagnosis?: No
Consent: The patient's verbal consent was obtained including but not limited to risks of crusting, scabbing, blistering, scarring, darker or lighter pigmentary change, recurrence, incomplete removal and infection.
Number Of Freeze-Thaw Cycles: 1 freeze-thaw cycle
Post-Care Instructions: I reviewed with the patient in detail post-care instructions. Patient is to wear sunprotection, and avoid picking at any of the treated lesions. Pt may apply Vaseline to crusted or scabbing areas.
Medical Necessity Clause: This procedure was medically necessary because the lesions that were treated were:
Duration Of Freeze Thaw-Cycle (Seconds): 10
Total Number Of Lesions Treated: 7

## 2017-08-09 ENCOUNTER — OFFICE VISIT (OUTPATIENT)
Dept: FAMILY MEDICINE | Facility: CLINIC | Age: 82
End: 2017-08-09
Payer: MEDICARE

## 2017-08-09 ENCOUNTER — APPOINTMENT (OUTPATIENT)
Dept: GENERAL RADIOLOGY | Facility: CLINIC | Age: 82
DRG: 811 | End: 2017-08-09
Attending: EMERGENCY MEDICINE
Payer: MEDICARE

## 2017-08-09 ENCOUNTER — HOSPITAL ENCOUNTER (INPATIENT)
Facility: CLINIC | Age: 82
LOS: 2 days | Discharge: HOME | DRG: 811 | End: 2017-08-11
Attending: EMERGENCY MEDICINE | Admitting: HOSPITALIST
Payer: MEDICARE

## 2017-08-09 VITALS
HEIGHT: 65 IN | OXYGEN SATURATION: 100 % | WEIGHT: 173.7 LBS | SYSTOLIC BLOOD PRESSURE: 126 MMHG | TEMPERATURE: 98 F | DIASTOLIC BLOOD PRESSURE: 62 MMHG | HEART RATE: 72 BPM | BODY MASS INDEX: 28.94 KG/M2

## 2017-08-09 DIAGNOSIS — I10 ESSENTIAL HYPERTENSION: ICD-10-CM

## 2017-08-09 DIAGNOSIS — D64.9 ANEMIA, UNSPECIFIED TYPE: ICD-10-CM

## 2017-08-09 DIAGNOSIS — M25.512 ACUTE PAIN OF LEFT SHOULDER: ICD-10-CM

## 2017-08-09 DIAGNOSIS — D64.9 SYMPTOMATIC ANEMIA: ICD-10-CM

## 2017-08-09 DIAGNOSIS — R60.9 SWELLING: ICD-10-CM

## 2017-08-09 DIAGNOSIS — R60.0 BILATERAL LOWER EXTREMITY EDEMA: Primary | ICD-10-CM

## 2017-08-09 DIAGNOSIS — R60.0 BILATERAL LOWER EXTREMITY EDEMA: ICD-10-CM

## 2017-08-09 LAB
ABO + RH BLD: NORMAL
ABO + RH BLD: NORMAL
ALBUMIN SERPL-MCNC: 3.5 G/DL (ref 3.4–5)
ALBUMIN UR-MCNC: NEGATIVE MG/DL
ALP SERPL-CCNC: 60 U/L (ref 40–150)
ALT SERPL W P-5'-P-CCNC: 22 U/L (ref 0–50)
ANION GAP SERPL CALCULATED.3IONS-SCNC: 9 MMOL/L (ref 3–14)
ANION GAP SERPL CALCULATED.3IONS-SCNC: 9 MMOL/L (ref 3–14)
APPEARANCE UR: CLEAR
AST SERPL W P-5'-P-CCNC: 30 U/L (ref 0–45)
BASOPHILS # BLD AUTO: 0 10E9/L (ref 0–0.2)
BASOPHILS NFR BLD AUTO: 0.3 %
BILIRUB SERPL-MCNC: 0.5 MG/DL (ref 0.2–1.3)
BILIRUB UR QL STRIP: NEGATIVE
BLD GP AB SCN SERPL QL: NORMAL
BLD PROD TYP BPU: NORMAL
BLD PROD TYP BPU: NORMAL
BLD UNIT ID BPU: 0
BLOOD BANK CMNT PATIENT-IMP: NORMAL
BLOOD PRODUCT CODE: NORMAL
BPU ID: NORMAL
BUN SERPL-MCNC: 24 MG/DL (ref 7–30)
BUN SERPL-MCNC: 24 MG/DL (ref 7–30)
CALCIUM SERPL-MCNC: 10 MG/DL (ref 8.5–10.1)
CALCIUM SERPL-MCNC: 9.7 MG/DL (ref 8.5–10.1)
CHLORIDE SERPL-SCNC: 102 MMOL/L (ref 94–109)
CHLORIDE SERPL-SCNC: 102 MMOL/L (ref 94–109)
CO2 SERPL-SCNC: 23 MMOL/L (ref 20–32)
CO2 SERPL-SCNC: 24 MMOL/L (ref 20–32)
COLOR UR AUTO: YELLOW
CREAT SERPL-MCNC: 1.37 MG/DL (ref 0.52–1.04)
CREAT SERPL-MCNC: 1.46 MG/DL (ref 0.52–1.04)
DIFFERENTIAL METHOD BLD: ABNORMAL
EOSINOPHIL # BLD AUTO: 0.1 10E9/L (ref 0–0.7)
EOSINOPHIL NFR BLD AUTO: 1.1 %
ERYTHROCYTE [DISTWIDTH] IN BLOOD BY AUTOMATED COUNT: 16.9 % (ref 10–15)
ERYTHROCYTE [DISTWIDTH] IN BLOOD BY AUTOMATED COUNT: 17.1 % (ref 10–15)
GFR SERPL CREATININE-BSD FRML MDRD: 34 ML/MIN/1.7M2
GFR SERPL CREATININE-BSD FRML MDRD: 37 ML/MIN/1.7M2
GLUCOSE BLDC GLUCOMTR-MCNC: 146 MG/DL (ref 70–99)
GLUCOSE SERPL-MCNC: 110 MG/DL (ref 70–99)
GLUCOSE SERPL-MCNC: 115 MG/DL (ref 70–99)
GLUCOSE UR STRIP-MCNC: NEGATIVE MG/DL
HCT VFR BLD AUTO: 22.9 % (ref 35–47)
HCT VFR BLD AUTO: 23.2 % (ref 35–47)
HEMOCCULT STL QL: POSITIVE
HGB BLD-MCNC: 6.4 G/DL (ref 11.7–15.7)
HGB BLD-MCNC: 6.6 G/DL (ref 11.7–15.7)
HGB BLD-MCNC: 6.8 G/DL (ref 11.7–15.7)
HGB UR QL STRIP: NEGATIVE
IMM GRANULOCYTES # BLD: 0 10E9/L (ref 0–0.4)
IMM GRANULOCYTES NFR BLD: 0.2 %
INR PPP: 1.41 (ref 0.86–1.14)
INTERPRETATION ECG - MUSE: NORMAL
KETONES UR STRIP-MCNC: NEGATIVE MG/DL
LEUKOCYTE ESTERASE UR QL STRIP: NEGATIVE
LYMPHOCYTES # BLD AUTO: 1.1 10E9/L (ref 0.8–5.3)
LYMPHOCYTES NFR BLD AUTO: 16.3 %
MCH RBC QN AUTO: 22.4 PG (ref 26.5–33)
MCH RBC QN AUTO: 22.8 PG (ref 26.5–33)
MCHC RBC AUTO-ENTMCNC: 28.8 G/DL (ref 31.5–36.5)
MCHC RBC AUTO-ENTMCNC: 29.3 G/DL (ref 31.5–36.5)
MCV RBC AUTO: 77 FL (ref 78–100)
MCV RBC AUTO: 79 FL (ref 78–100)
MONOCYTES # BLD AUTO: 0.5 10E9/L (ref 0–1.3)
MONOCYTES NFR BLD AUTO: 7.3 %
NEUTROPHILS # BLD AUTO: 4.8 10E9/L (ref 1.6–8.3)
NEUTROPHILS NFR BLD AUTO: 74.8 %
NITRATE UR QL: NEGATIVE
NRBC # BLD AUTO: 0 10*3/UL
NRBC BLD AUTO-RTO: 0 /100
NT-PROBNP SERPL-MCNC: 3498 PG/ML (ref 0–1800)
NUM BPU REQUESTED: 2
PH UR STRIP: 7 PH (ref 5–7)
PLATELET # BLD AUTO: 234 10E9/L (ref 150–450)
PLATELET # BLD AUTO: 299 10E9/L (ref 150–450)
POTASSIUM SERPL-SCNC: 4.3 MMOL/L (ref 3.4–5.3)
POTASSIUM SERPL-SCNC: 4.5 MMOL/L (ref 3.4–5.3)
PROT SERPL-MCNC: 7.5 G/DL (ref 6.8–8.8)
RBC # BLD AUTO: 2.89 10E12/L (ref 3.8–5.2)
RBC # BLD AUTO: 3.03 10E12/L (ref 3.8–5.2)
SODIUM SERPL-SCNC: 134 MMOL/L (ref 133–144)
SODIUM SERPL-SCNC: 135 MMOL/L (ref 133–144)
SP GR UR STRIP: 1.01 (ref 1–1.03)
SPECIMEN EXP DATE BLD: NORMAL
TRANSFUSION STATUS PATIENT QL: NORMAL
TRANSFUSION STATUS PATIENT QL: NORMAL
URN SPEC COLLECT METH UR: NORMAL
UROBILINOGEN UR STRIP-ACNC: 0.2 EU/DL (ref 0.2–1)
WBC # BLD AUTO: 6.5 10E9/L (ref 4–11)
WBC # BLD AUTO: 7.4 10E9/L (ref 4–11)

## 2017-08-09 PROCEDURE — 85610 PROTHROMBIN TIME: CPT | Performed by: EMERGENCY MEDICINE

## 2017-08-09 PROCEDURE — 85027 COMPLETE CBC AUTOMATED: CPT | Performed by: NURSE PRACTITIONER

## 2017-08-09 PROCEDURE — 86923 COMPATIBILITY TEST ELECTRIC: CPT | Performed by: EMERGENCY MEDICINE

## 2017-08-09 PROCEDURE — 80053 COMPREHEN METABOLIC PANEL: CPT | Performed by: EMERGENCY MEDICINE

## 2017-08-09 PROCEDURE — 00000146 ZZHCL STATISTIC GLUCOSE BY METER IP

## 2017-08-09 PROCEDURE — 99207 ZZC CDG-MDM COMPONENT: MEETS MODERATE - UP CODED: CPT | Performed by: HOSPITALIST

## 2017-08-09 PROCEDURE — 99207 ZZC OFFICE-HOSPITAL ADMIT: CPT | Performed by: NURSE PRACTITIONER

## 2017-08-09 PROCEDURE — 85025 COMPLETE CBC W/AUTO DIFF WBC: CPT | Performed by: EMERGENCY MEDICINE

## 2017-08-09 PROCEDURE — 83880 ASSAY OF NATRIURETIC PEPTIDE: CPT | Performed by: EMERGENCY MEDICINE

## 2017-08-09 PROCEDURE — 99223 1ST HOSP IP/OBS HIGH 75: CPT | Mod: AI | Performed by: HOSPITALIST

## 2017-08-09 PROCEDURE — 12000000 ZZH R&B MED SURG/OB

## 2017-08-09 PROCEDURE — 99285 EMERGENCY DEPT VISIT HI MDM: CPT | Mod: 25

## 2017-08-09 PROCEDURE — 99207 ZZC CDG-CODE CATEGORY CHANGED: CPT | Performed by: HOSPITALIST

## 2017-08-09 PROCEDURE — 25000125 ZZHC RX 250: Performed by: HOSPITALIST

## 2017-08-09 PROCEDURE — P9016 RBC LEUKOCYTES REDUCED: HCPCS | Performed by: EMERGENCY MEDICINE

## 2017-08-09 PROCEDURE — A9270 NON-COVERED ITEM OR SERVICE: HCPCS | Mod: GY | Performed by: HOSPITALIST

## 2017-08-09 PROCEDURE — 96374 THER/PROPH/DIAG INJ IV PUSH: CPT

## 2017-08-09 PROCEDURE — 25000128 H RX IP 250 OP 636: Performed by: EMERGENCY MEDICINE

## 2017-08-09 PROCEDURE — 81003 URINALYSIS AUTO W/O SCOPE: CPT | Performed by: EMERGENCY MEDICINE

## 2017-08-09 PROCEDURE — 25000132 ZZH RX MED GY IP 250 OP 250 PS 637: Mod: GY | Performed by: HOSPITALIST

## 2017-08-09 PROCEDURE — 86850 RBC ANTIBODY SCREEN: CPT | Performed by: EMERGENCY MEDICINE

## 2017-08-09 PROCEDURE — 36415 COLL VENOUS BLD VENIPUNCTURE: CPT | Performed by: NURSE PRACTITIONER

## 2017-08-09 PROCEDURE — 36430 TRANSFUSION BLD/BLD COMPNT: CPT

## 2017-08-09 PROCEDURE — 93005 ELECTROCARDIOGRAM TRACING: CPT

## 2017-08-09 PROCEDURE — 71020 XR CHEST 2 VW: CPT

## 2017-08-09 PROCEDURE — 82272 OCCULT BLD FECES 1-3 TESTS: CPT | Performed by: EMERGENCY MEDICINE

## 2017-08-09 PROCEDURE — 86900 BLOOD TYPING SEROLOGIC ABO: CPT | Performed by: EMERGENCY MEDICINE

## 2017-08-09 PROCEDURE — 86901 BLOOD TYPING SEROLOGIC RH(D): CPT | Performed by: EMERGENCY MEDICINE

## 2017-08-09 PROCEDURE — 80048 BASIC METABOLIC PNL TOTAL CA: CPT | Performed by: NURSE PRACTITIONER

## 2017-08-09 RX ORDER — TIOTROPIUM BROMIDE 18 UG/1
18 CAPSULE ORAL; RESPIRATORY (INHALATION) DAILY
Status: DISCONTINUED | OUTPATIENT
Start: 2017-08-09 | End: 2017-08-09

## 2017-08-09 RX ORDER — ACETAMINOPHEN 325 MG/1
650 TABLET ORAL EVERY 4 HOURS PRN
Status: DISCONTINUED | OUTPATIENT
Start: 2017-08-09 | End: 2017-08-11 | Stop reason: HOSPADM

## 2017-08-09 RX ORDER — HYDROXYZINE PAMOATE 25 MG/1
25 CAPSULE ORAL 2 TIMES DAILY PRN
Status: DISCONTINUED | OUTPATIENT
Start: 2017-08-09 | End: 2017-08-11 | Stop reason: HOSPADM

## 2017-08-09 RX ORDER — NALOXONE HYDROCHLORIDE 0.4 MG/ML
.1-.4 INJECTION, SOLUTION INTRAMUSCULAR; INTRAVENOUS; SUBCUTANEOUS
Status: DISCONTINUED | OUTPATIENT
Start: 2017-08-09 | End: 2017-08-11 | Stop reason: HOSPADM

## 2017-08-09 RX ORDER — FLUTICASONE PROPIONATE 50 MCG
1 SPRAY, SUSPENSION (ML) NASAL DAILY
Status: DISCONTINUED | OUTPATIENT
Start: 2017-08-10 | End: 2017-08-11 | Stop reason: HOSPADM

## 2017-08-09 RX ORDER — FUROSEMIDE 40 MG
40 TABLET ORAL DAILY
Status: DISCONTINUED | OUTPATIENT
Start: 2017-08-10 | End: 2017-08-11 | Stop reason: HOSPADM

## 2017-08-09 RX ORDER — SIMVASTATIN 10 MG
10 TABLET ORAL AT BEDTIME
Status: DISCONTINUED | OUTPATIENT
Start: 2017-08-09 | End: 2017-08-11 | Stop reason: HOSPADM

## 2017-08-09 RX ORDER — LISINOPRIL 5 MG/1
10 TABLET ORAL DAILY
COMMUNITY
End: 2017-09-28 | Stop reason: DRUGHIGH

## 2017-08-09 RX ORDER — DEXTROSE MONOHYDRATE 25 G/50ML
25-50 INJECTION, SOLUTION INTRAVENOUS
Status: DISCONTINUED | OUTPATIENT
Start: 2017-08-09 | End: 2017-08-10

## 2017-08-09 RX ORDER — ONDANSETRON 4 MG/1
4 TABLET, ORALLY DISINTEGRATING ORAL EVERY 6 HOURS PRN
Status: DISCONTINUED | OUTPATIENT
Start: 2017-08-09 | End: 2017-08-11 | Stop reason: HOSPADM

## 2017-08-09 RX ORDER — ALBUTEROL SULFATE 90 UG/1
2 AEROSOL, METERED RESPIRATORY (INHALATION) EVERY 4 HOURS PRN
Status: DISCONTINUED | OUTPATIENT
Start: 2017-08-09 | End: 2017-08-11 | Stop reason: HOSPADM

## 2017-08-09 RX ORDER — NICOTINE POLACRILEX 4 MG
15-30 LOZENGE BUCCAL
Status: DISCONTINUED | OUTPATIENT
Start: 2017-08-09 | End: 2017-08-10

## 2017-08-09 RX ORDER — OXYCODONE AND ACETAMINOPHEN 5; 325 MG/1; MG/1
1-2 TABLET ORAL EVERY 4 HOURS PRN
Status: DISCONTINUED | OUTPATIENT
Start: 2017-08-09 | End: 2017-08-11 | Stop reason: HOSPADM

## 2017-08-09 RX ORDER — AMOXICILLIN 250 MG
2 CAPSULE ORAL 2 TIMES DAILY
Status: DISCONTINUED | OUTPATIENT
Start: 2017-08-09 | End: 2017-08-09

## 2017-08-09 RX ORDER — ONDANSETRON 2 MG/ML
4 INJECTION INTRAMUSCULAR; INTRAVENOUS EVERY 6 HOURS PRN
Status: DISCONTINUED | OUTPATIENT
Start: 2017-08-09 | End: 2017-08-11 | Stop reason: HOSPADM

## 2017-08-09 RX ORDER — HYDROCODONE BITARTRATE AND ACETAMINOPHEN 5; 325 MG/1; MG/1
.5-1 TABLET ORAL EVERY 6 HOURS PRN
Qty: 20 TABLET | Refills: 0 | Status: SHIPPED | OUTPATIENT
Start: 2017-08-09 | End: 2017-08-14

## 2017-08-09 RX ORDER — FUROSEMIDE 10 MG/ML
40 INJECTION INTRAMUSCULAR; INTRAVENOUS ONCE
Status: COMPLETED | OUTPATIENT
Start: 2017-08-09 | End: 2017-08-09

## 2017-08-09 RX ADMIN — PANTOPRAZOLE SODIUM 40 MG: 40 INJECTION, POWDER, FOR SOLUTION INTRAVENOUS at 22:28

## 2017-08-09 RX ADMIN — SIMVASTATIN 10 MG: 10 TABLET, FILM COATED ORAL at 22:28

## 2017-08-09 RX ADMIN — FUROSEMIDE 40 MG: 10 INJECTION, SOLUTION INTRAVENOUS at 18:23

## 2017-08-09 ASSESSMENT — ENCOUNTER SYMPTOMS: BLOOD IN STOOL: 0

## 2017-08-09 NOTE — ED NOTES
Bed: ED05  Expected date:   Expected time:   Means of arrival:   Comments:  N706 26f abd pain/appy

## 2017-08-09 NOTE — PROGRESS NOTES
"HPI    SUBJECTIVE:                                                    Helene Gibbs is a 82 year old female who presents to clinic today for the following health issues:      Chief Complaint   Patient presents with     Leg Swelling     swelling being reported in bilateral legs and feet, pain also noted in both legs     Shortness of Breath     some SOB with exertion       Patient complains of leg swelling for the last 12-14 days. She stated that her home care nurse last week noticed an increase in the swelling.  Comes in today because \"her legs ache and are swollen\"  Denies chest pain  Slight increase in work of breathing with walking  Has not been doing daily weights recently. Wt is up 20# since last visit   Complains of pain in her shoulder specifically in the morning and after physical therapy  She has been taking 20mg Lasix most days   Denies blood in stool   She has chronic constipation that is unchanged   No dizziness      Past Medical History:   Diagnosis Date     Anemia      Ankle arthritis      Arthritis      Back pain      Bell's palsy 9/08    left     Breast CA (H) 2004-    left lumpectomy, radiation, -recurrence     Colon polyps     colonoscopy-9/12-next due in 9/13     Congestive heart failure (H)      COPD (chronic obstructive pulmonary disease) (H)      Coronary artery disease      DM (diabetes mellitus) (H)      GERD (gastroesophageal reflux disease)      Hyperlipidemia      Hypertension      Lumbar spinal stenosis     use cane     Obesity      Osteoporosis      Other chronic pain      Pacemaker      Permanent atrial fibrillation (H) 8/2008    S/P AV node ablation and pacemaker 10-25-12       Pleural effusion      Pulmonary hypertension (H)      Rectal stenosis      Tobacco abuse     current     Past Surgical History:   Procedure Laterality Date     ARTHROSCOPY SHOULDER RT/LT  1999, 2004    Bilateral, right then left     C DEXA, BONE DENSITY, AXIAL SKEL       C MAMMOGRAM, SCREENING  1/2009     " COLONOSCOPY N/A 10/7/2016    Procedure: COMBINED COLONOSCOPY, SINGLE OR MULTIPLE BIOPSY/POLYPECTOMY BY BIOPSY;  Surgeon: Cassandra Mccord MD;  Location:  GI     H ABLATION AV NODE  10/2012     HC COLONOSCOPY THRU STOMA, DIAGNOSTIC  ? 2005     IMPLANT PACEMAKER  10/2012    [place] [id number], [id number]     JOINT REPLACEMTN, KNEE RT/LT      Joint Replacement knee bilateral     LAPAROSCOPIC CHOLECYSTECTOMY WITH CHOLANGIOGRAMS N/A 12/14/2015    Procedure: LAPAROSCOPIC CHOLECYSTECTOMY WITH CHOLANGIOGRAMS;  Surgeon: rEvin Amos MD;  Location:  OR     LUMPECTOMY BREAST      Left-2004     PHACOEMULSIFICATION CLEAR CORNEA WITH STANDARD INTRAOCULAR LENS IMPLANT Left 7/16/2015    Procedure: PHACOEMULSIFICATION CLEAR CORNEA WITH STANDARD INTRAOCULAR LENS IMPLANT;  Surgeon: Sai Obregon MD;  Location: [facility]     PHACOEMULSIFICATION CLEAR CORNEA WITH TORIC INTRAOCULAR LENS IMPLANT Right 5/9/2017    Procedure: PHACOEMULSIFICATION CLEAR CORNEA WITH TORIC INTRAOCULAR LENS IMPLANT;  RIGHT EYE PHACOEMULSIFICATION CLEAR CORNEA WITH TORIC INTRAOCULAR LENS IMPLANT ;  Surgeon: Duc Richmond MD;  Location: [facility]     Social History   Substance Use Topics     Smoking status: Current Every Day Smoker     Packs/day: 0.50     Years: 45.00     Types: Cigarettes     Smokeless tobacco: Never Used      Comment: 01/25/15 6 or less cigarettes per day, intermittent     Alcohol use No     Current Outpatient Prescriptions   Medication Sig Dispense Refill     lisinopril (PRINIVIL/ZESTRIL) 5 MG tablet Take 2 tablets (10 mg) by mouth daily       HYDROcodone-acetaminophen (NORCO) 5-325 MG per tablet Take 0.5-1 tablets by mouth every 6 hours as needed for moderate to severe pain maximum 2 tablet(s) per day 20 tablet 0     furosemide (LASIX) 20 MG tablet TAKE 1-2 TABS BY MOUTH ONCE DAILY. MAY REPEAT AFTER NOON IF NEEDED FOR WEIGHT GAIN/2+ EDEMA 180 tablet 1     tiotropium (SPIRIVA HANDIHALER) 18 MCG capsule Inhale 1 capsule (18 mcg)  into the lungs daily 30 capsule 3     traMADol (ULTRAM) 50 MG tablet Take 1 tablet (50 mg) by mouth every 6 hours as needed for moderate pain 100 tablet 1     PROCTOSOL HC 2.5 % cream PLACE RECTALLY 2 TIMES DAILY AS NEEDED FOR HEMORRHOIDS 30 g 1     simvastatin (ZOCOR) 10 MG tablet Take 1 tablet (10 mg) by mouth At Bedtime 90 tablet 1     apixaban ANTICOAGULANT (ELIQUIS) 5 MG tablet Take 1 tablet (5 mg) by mouth 2 times daily 180 tablet 2     hydrOXYzine (VISTARIL) 25 MG capsule Take 1 capsule (25 mg) by mouth 2 times daily as needed for itching 180 capsule 1     [DISCONTINUED] lisinopril (PRINIVIL/ZESTRIL) 5 MG tablet TAKE 1 TABLET (5 MG) BY MOUTH DAILY 90 tablet 1     fluticasone (FLONASE) 50 MCG/ACT spray INHALE 1 TO 2 SPRAYS INTO BOTH NOSTRILS DAILY 16 mL 3     ondansetron (ZOFRAN ODT) 4 MG ODT tab Take 1 tablet (4 mg) by mouth every 6 hours as needed for nausea 30 tablet 3     glipiZIDE (GLUCOTROL) 5 MG tablet TAKE 1 TABLET BY MOUTH EVERY MORNING AND 0.5 TABLET EVERY EVENING 135 tablet 1     PROCTOSOL HC 2.5 % rectal cream PLACE RECTALLY 2 TIMES DAILY AS NEEDED FOR HEMORRHOIDS 28.35 g 5     COMPOUND (CMPD RX) - PHARMACY TO MIX COMPOUNDED MEDICATION Vaginal valium- 10 mg suppository 42 suppository 3     Tiotropium Bromide Monohydrate (SPIRIVA HANDIHALER IN)        fluocinolone (SYNALAR) 0.01 % external solution   1     betamethasone valerate (VALISONE) 0.1 % cream Apply topically 2 times daily Use sparingly 15 g 1     hydrocortisone (ANUSOL-HC) 2.5 % rectal cream Place rectally 2 times daily as needed for hemorrhoids 28 g 1     ACCU-CHEK SMARTVIEW test strip 1 strip by In Vitro route 2 times daily Or as directed 100 each 1     blood glucose monitoring (ACCU-CHEK FASTCLIX) lancets USE TO TEST BLOOD SUGAR TWO TIMES A DAY OR AS DIRECTED 1 Box 11     Multiple Vitamins-Minerals (PRESERVISION/LUTEIN) CAPS Take 1 capsule by mouth 2 times daily       [DISCONTINUED] furosemide (LASIX) 20 MG tablet Take 20 mg by mouth  "daily Repeat every aftenoon prn weight gain of 2 lbs and or 2+ edema lower extremities       nystatin (MYCOSTATIN) 023204 UNIT/GM POWD Apply topically 2 times daily as needed 60 g 1     ORDER FOR DME, SET TO LOCAL PRINT, Equipment being ordered: AccuChek Smart View strips  Patient tests twice daily. 1 each 11     albuterol (PROAIR HFA, PROVENTIL HFA, VENTOLIN HFA) 108 (90 BASE) MCG/ACT inhaler Inhale 2 puffs into the lungs every 4 hours as needed for shortness of breath / dyspnea or wheezing 1 Inhaler 5     magnesium hydroxide (MILK OF MAGNESIA) 400 MG/5ML suspension Take 30 mLs by mouth At Bedtime        senna-docusate (SENOKOT-S;PERICOLACE) 8.6-50 MG per tablet Take 2 tablets by mouth 2 times daily       hydrocortisone 0.5 % cream Apply topically 3 times daily as needed for itching (on arms) SEND HOME WITH PATIENT       Allergies   Allergen Reactions     Penicillins Hives     Ambien [Zolpidem Tartrate]      Hallucinations and fell out of bed at night.     Definity      Caused a syncopal episode.     Sulfa Drugs Itching     Cymbalta Rash     Fluconazole Rash       Reviewed and updated as needed this visit by clinical staff and provider        ROS  Detailed as above       /62 (BP Location: Right arm, Patient Position: Sitting, Cuff Size: Adult Regular)  Pulse 72  Temp 98  F (36.7  C) (Oral)  Ht 5' 5\" (1.651 m)  Wt 173 lb 11.2 oz (78.8 kg)  LMP  (LMP Unknown)  SpO2 100%  Breastfeeding? No  BMI 28.91 kg/m2      Physical Exam   Constitutional: She is well-developed, well-nourished, and in no distress.   HENT:   Head: Normocephalic.   Eyes: Conjunctivae are normal.   Cardiovascular: Normal rate, regular rhythm and normal heart sounds.    No murmur heard.  Pulmonary/Chest: Effort normal and breath sounds normal. No respiratory distress.   Musculoskeletal: She exhibits edema.   +4 pitting right JAMARI  +3 pitting L JAMARI   Neurological: She is alert.   Skin: Skin is warm and dry. There is pallor.   Psychiatric: " Mood and affect normal.   Vitals reviewed.        Assessment and Plan:       ICD-10-CM    1. Bilateral lower extremity edema R60.0 Basic metabolic panel  (Ca, Cl, CO2, Creat, Gluc, K, Na, BUN)     Hemoglobin     CBC with platelets   2. Anemia, unspecified type D64.9    3. Essential hypertension I10 lisinopril (PRINIVIL/ZESTRIL) 5 MG tablet   4. Acute pain of left shoulder M25.512 HYDROcodone-acetaminophen (NORCO) 5-325 MG per tablet       Pt presents with New bilateral JAMARI   Workup shows that she is newly anemic at 6.4 when 4 months ago she was 11.8   BMP is pending.   She denies any source of bleeding  She is on eliquis   We will send her to the ED at this point for further workup for symptomatic anemia    She does have an appt on Monday with PCP scheduled at this point that may need to be canceled       ALLEN Siddiqui, CNP  [facility]

## 2017-08-09 NOTE — IP AVS SNAPSHOT
MRN:[id number]                      After Visit Summary   8/9/2017    Helene Gibbs    MRN: [id number]           Thank you!     Thank you for choosing [facility] for your care. Our goal is always to provide you with excellent care. Hearing back from our patients is one way we can continue to improve our services. Please take a few minutes to complete the written survey that you may receive in the mail after you visit with us. Thank you!        Patient Information     Date Of Birth          1935        About your hospital stay     You were admitted on:  August 9, 2017 You last received care in the:  [place] [address] [facility]    You were discharged on:  August 11, 2017        Reason for your hospital stay       Admitted for evaluation of anemia.  EGD did not show bleeding.  Biopsies from gastric and duodenum pending.  Had 2 units of pRBC since admission.                  Who to Call     For medical emergencies, please call 911.  For non-urgent questions about your medical care, please call your primary care provider or clinic, [phone]  For questions related to your surgery, please call your surgery clinic        Attending Provider     Provider Specialty    Brenda Mahan MD Emergency Medicine    [facility]       Primary Care Provider Office Phone # Fax #    Neisha Esparza [phone]      After Care Instructions     Diet       Follow this diet upon discharge: Orders Placed This Encounter      Combination Diet 9963-2944 Calories: Moderate Consistent CHO (4-6 CHO units/meal); 2 gm NA Diet                  Follow-up Appointments     Follow Up and recommended labs and tests       Follow up with primary care provider, Neisha Esparza, as scheduled on 8/14 for hospital follow- up.  The following labs/tests are recommended: hgb  PMD to arrange outpatient evaluation by hematology as GI recommends for further evaluation of anemia.  Hold  mateusz until Dr. Cam's visit, then restart per PMD  .                  Your next 10 appointments already scheduled     Aug 14, 2017  1:30 PM CDT   Office Visit with Neisha Esparza MD   [facility] ([facility]    [address] [id number] [phone]           Bring a current list of meds and any records pertaining to this visit. For Physicals, please bring immunization records and any forms needing to be filled out. Please arrive 10 minutes early to complete paperwork.            Aug 14, 2017  2:50 PM CDT   MICHAELLE Extremity with Maricruz Bowen, PT   [facility] ([facility]  )    [address]  [place] [id number] [phone]            Aug 30, 2017  1:30 PM CDT   MICHAELLE Extremity with Maricruz Bowen, PT   [facility] ([facility]  )    [address]  [place] [id number] [phone]            Oct 10, 2017  1:15 PM CDT   Teletrace with STONE TECH1   [facility] [place] ([facility])    [address]  [place] [id number] [phone]              Pending Results     Date and Time Order Name Status Description    8/11/2017 0722 Folate In process     8/10/2017 1425 Surgical pathology exam In process             Statement of Approval     Ordered          08/11/17 1102  I have reviewed and agree with all the recommendations and orders detailed in this document.  EFFECTIVE NOW     Approved and electronically signed by:  Michelle Zuniga MD             Admission Information     Date & Time Provider Department Dept. Phone    8/9/2017 Jules Rodrigues MD [place] [address] [facility] [phone]      Your Vitals Were     Blood Pressure Pulse Temperature Respirations Weight Last Period    141/72 (BP Location: Left arm) 70 97.4  F (36.3  C) (Oral) 18 76.3 kg (168 lb 3.2 oz) (LMP Unknown)     "Pulse Oximetry BMI (Body Mass Index)                94% 27.99 kg/m2          [facility] Information     [employer] lets you send messages to your doctor, view your test results, renew your prescriptions, schedule appointments and more. To sign up, go to [email or web address] . Click on \"Log in\" on the left side of the screen, which will take you to the Welcome page. Then click on \"Sign up Now\" on the right side of the page.     You will be asked to enter the access code listed below, as well as some personal information. Please follow the directions to create your username and password.     Your access code is: [email or web address]  Expires: 10/24/2017  5:07 PM     Your access code will  in 90 days. If you need help or a new code, please call your [place] clinic or [phone].        Care EveryWhere ID     This is your Care EveryWhere ID. This could be used by other organizations to access your [place] medical records  [id number]        Equal Access to Services     [facility] CenterMARIMAR : Hadii sukhdeep mercer hadbeatriceo Somariia, waaxda luqadaha, qaybta kaalmada ademaik, pamela mccullough . So [facility] [phone].    ATENCIÓN: Si habla español, tiene a gupta disposición servicios gratuitos de asistencia lingüística. Llame al [phone].    We comply with applicable federal civil rights laws and [place] laws. We do not discriminate on the basis of race, color, national origin, age, disability sex, sexual orientation or gender identity.               Review of your medicines      CONTINUE these medicines which have NOT CHANGED        Dose / Directions    ACCU-CHEK SMARTVIEW test strip   Used for:  Type 2 diabetes mellitus with diabetic nephropathy (H)   Generic drug:  blood glucose monitoring        Dose:  1 strip   1 strip by In Vitro route 2 times daily Or as directed   Quantity:  100 each   Refills:  1       albuterol 108 (90 BASE) MCG/ACT Inhaler   Commonly known as:  PROAIR HFA/PROVENTIL HFA/VENTOLIN HFA "   Used for:  COPD (chronic obstructive pulmonary disease) (H)        Dose:  2 puff   Inhale 2 puffs into the lungs every 4 hours as needed for shortness of breath / dyspnea or wheezing   Quantity:  1 Inhaler   Refills:  5       betamethasone valerate 0.1 % cream   Commonly known as:  VALISONE   Used for:  Rash        Apply topically 2 times daily Use sparingly   Quantity:  15 g   Refills:  1       blood glucose monitoring lancets   Used for:  Type 2 diabetes mellitus with diabetic nephropathy (H)        USE TO TEST BLOOD SUGAR TWO TIMES A DAY OR AS DIRECTED   Quantity:  1 Box   Refills:  11       COMPOUNDED NON-CONTROLLED SUBSTANCE - PHARMACY TO MIX COMPOUNDED MEDICATION   Commonly known as:  CMPD RX   Used for:  Vaginal pain, Pelvic floor tension, Urinary retention        Vaginal valium- 10 mg suppository   Quantity:  42 suppository   Refills:  3       fluocinolone 0.01 % solution   Commonly known as:  SYNALAR        Apply topically daily as needed (itchy scalp)   Refills:  1       fluticasone 50 MCG/ACT spray   Commonly known as:  FLONASE   Used for:  Rhinitis, unspecified type        INHALE 1 TO 2 SPRAYS INTO BOTH NOSTRILS DAILY   Quantity:  16 mL   Refills:  3       furosemide 20 MG tablet   Commonly known as:  LASIX   Used for:  Swelling        TAKE 1-2 TABS BY MOUTH ONCE DAILY. MAY REPEAT AFTER NOON IF NEEDED FOR WEIGHT GAIN/2+ EDEMA   Quantity:  180 tablet   Refills:  1       GLIPIZIDE PO        Dose:  5 mg   Take 5 mg by mouth every morning (before breakfast)   Refills:  0       HYDROcodone-acetaminophen 5-325 MG per tablet   Commonly known as:  NORCO   Used for:  Acute pain of left shoulder        Dose:  0.5-1 tablet   Take 0.5-1 tablets by mouth every 6 hours as needed for moderate to severe pain maximum 2 tablet(s) per day   Quantity:  20 tablet   Refills:  0       hydrocortisone 2.5 % cream   Commonly known as:  ANUSOL-HC   Used for:  External hemorrhoids        Place rectally 2 times daily as needed for  hemorrhoids   Quantity:  28 g   Refills:  1       hydrOXYzine 25 MG capsule   Commonly known as:  VISTARIL   Used for:  Itching        Dose:  25 mg   Take 1 capsule (25 mg) by mouth 2 times daily as needed for itching   Quantity:  180 capsule   Refills:  1       lisinopril 5 MG tablet   Commonly known as:  PRINIVIL/ZESTRIL   Used for:  Essential hypertension        Dose:  10 mg   Take 2 tablets (10 mg) by mouth daily   Refills:  0       magnesium hydroxide 400 MG/5ML suspension   Commonly known as:  MILK OF MAGNESIA        Dose:  30 mL   Take 30 mLs by mouth At Bedtime   Refills:  0       nystatin 115005 UNIT/GM Powd   Commonly known as:  MYCOSTATIN   Used for:  Rash        Apply topically 2 times daily as needed   Quantity:  60 g   Refills:  1       order for DME   Used for:  Type II or unspecified type diabetes mellitus without mention of complication, not stated as uncontrolled        Equipment being ordered: AccuChek Smart View strips Patient tests twice daily.   Quantity:  1 each   Refills:  11       PRESERVISION/LUTEIN Caps        Dose:  1 capsule   Take 1 capsule by mouth daily   Refills:  0       simvastatin 10 MG tablet   Commonly known as:  ZOCOR   Used for:  Type 2 diabetes mellitus with diabetic nephropathy, without long-term current use of insulin (H)        Dose:  10 mg   Take 1 tablet (10 mg) by mouth At Bedtime   Quantity:  90 tablet   Refills:  1       tiotropium 18 MCG capsule   Commonly known as:  SPIRIVA HANDIHALER   Used for:  Chronic obstructive pulmonary disease, unspecified COPD type (H)        Dose:  18 mcg   Inhale 1 capsule (18 mcg) into the lungs daily   Quantity:  30 capsule   Refills:  3       traMADol 50 MG tablet   Commonly known as:  ULTRAM   Used for:  Intractable back pain        Dose:  50 mg   Take 1 tablet (50 mg) by mouth every 6 hours as needed for moderate pain   Quantity:  100 tablet   Refills:  1         STOP taking     apixaban ANTICOAGULANT 5 MG tablet   Commonly known as:   FERNANDO                    Protect others around you: Learn how to safely use, store and throw away your medicines at www.disposemymeds.org.             Medication List: This is a list of all your medications and when to take them. Check marks below indicate your daily home schedule. Keep this list as a reference.      Medications           Morning Afternoon Evening Bedtime As Needed    ACCU-CHEK SMARTVIEW test strip   1 strip by In Vitro route 2 times daily Or as directed   Generic drug:  blood glucose monitoring                                albuterol 108 (90 BASE) MCG/ACT Inhaler   Commonly known as:  PROAIR HFA/PROVENTIL HFA/VENTOLIN HFA   Inhale 2 puffs into the lungs every 4 hours as needed for shortness of breath / dyspnea or wheezing                                betamethasone valerate 0.1 % cream   Commonly known as:  VALISONE   Apply topically 2 times daily Use sparingly                                blood glucose monitoring lancets   USE TO TEST BLOOD SUGAR TWO TIMES A DAY OR AS DIRECTED                                COMPOUNDED NON-CONTROLLED SUBSTANCE - PHARMACY TO MIX COMPOUNDED MEDICATION   Commonly known as:  CMPD RX   Vaginal valium- 10 mg suppository                                fluocinolone 0.01 % solution   Commonly known as:  SYNALAR   Apply topically daily as needed (itchy scalp)                                fluticasone 50 MCG/ACT spray   Commonly known as:  FLONASE   INHALE 1 TO 2 SPRAYS INTO BOTH NOSTRILS DAILY   Last time this was given:  1 spray on 8/11/2017  8:08 AM                                furosemide 20 MG tablet   Commonly known as:  LASIX   TAKE 1-2 TABS BY MOUTH ONCE DAILY. MAY REPEAT AFTER NOON IF NEEDED FOR WEIGHT GAIN/2+ EDEMA   Last time this was given:  40 mg on 8/11/2017  8:09 AM                                GLIPIZIDE PO   Take 5 mg by mouth every morning (before breakfast)                                HYDROcodone-acetaminophen 5-325 MG per tablet   Commonly  known as:  NORCO   Take 0.5-1 tablets by mouth every 6 hours as needed for moderate to severe pain maximum 2 tablet(s) per day                                hydrocortisone 2.5 % cream   Commonly known as:  ANUSOL-HC   Place rectally 2 times daily as needed for hemorrhoids                                hydrOXYzine 25 MG capsule   Commonly known as:  VISTARIL   Take 1 capsule (25 mg) by mouth 2 times daily as needed for itching                                lisinopril 5 MG tablet   Commonly known as:  PRINIVIL/ZESTRIL   Take 2 tablets (10 mg) by mouth daily                                magnesium hydroxide 400 MG/5ML suspension   Commonly known as:  MILK OF MAGNESIA   Take 30 mLs by mouth At Bedtime                                nystatin 354351 UNIT/GM Powd   Commonly known as:  MYCOSTATIN   Apply topically 2 times daily as needed                                order for DME   Equipment being ordered: AccuChek Smart View strips Patient tests twice daily.                                PRESERVISION/LUTEIN Caps   Take 1 capsule by mouth daily                                simvastatin 10 MG tablet   Commonly known as:  ZOCOR   Take 1 tablet (10 mg) by mouth At Bedtime   Last time this was given:  10 mg on 8/10/2017  9:33 PM                                tiotropium 18 MCG capsule   Commonly known as:  SPIRIVA HANDIHALER   Inhale 1 capsule (18 mcg) into the lungs daily                                traMADol 50 MG tablet   Commonly known as:  ULTRAM   Take 1 tablet (50 mg) by mouth every 6 hours as needed for moderate pain

## 2017-08-09 NOTE — IP AVS SNAPSHOT
[address], [address]    [facility] [address]    Phone:  [phone]                                       After Visit Summary   8/9/2017    Helene Gibbs    MRN: [id number]           After Visit Summary Signature Page     I have received my discharge instructions, and my questions have been answered. I have discussed any challenges I see with this plan with the nurse or doctor.    ..........................................................................................................................................  Patient/Patient Representative Signature      ..........................................................................................................................................  Patient Representative Print Name and Relationship to Patient    ..................................................               ................................................  Date                                            Time    ..........................................................................................................................................  Reviewed by Signature/Title    ...................................................              ..............................................  Date                                                            Time

## 2017-08-09 NOTE — MR AVS SNAPSHOT
After Visit Summary   8/9/2017    Helene Gibbs    MRN: [id number]           Patient Information     Date Of Birth          1935        Visit Information        Provider Department      8/9/2017 2:00 PM Tip Sheppard APRN CNP [facility]        Today's Diagnoses     Bilateral lower extremity edema    -  1    Essential hypertension        Acute pain of left shoulder          Care Instructions    Take Lasix 40mg twice a day thru Sunday     You will see Dr Esparza on Monday 8/14 at 1:30. Please come a little early.           Follow-ups after your visit        Your next 10 appointments already scheduled     Aug 14, 2017  2:50 PM CDT   MICHAELLE Extremity with Maricruz Bowen, PT   [facility] (MICHAELLE [facility]  )    [address]  [facility] [id number] [phone]            Aug 30, 2017  1:30 PM CDT   MICHAELLE Extremity with Maricruz Bowen, PT   [facility] (MICHAELLE [facility]  )    [address]  [place] [facility] [id number] [phone]            Oct 10, 2017  1:15 PM CDT   [place] with STONE TECH1   [facility]YS[facility] [place] ([facility])    [address]  [place] [facility] [id number] [phone]              Who to contact     If you have questions or need follow up information about today's clinic visit or your schedule please contact [facility] directly at [phone].  Normal or non-critical lab and imaging results will be communicated to you by MyChart, letter or phone within 4 business days after the clinic has received the results. If you do not hear from us within 7 days, please contact the clinic through MyChart or phone. If you have a critical or abnormal lab result, we will notify you by phone as soon as possible.  Submit refill requests through [employer] or call your pharmacy and they will forward the refill request to  "us. Please allow 3 business days for your refill to be completed.          Additional Information About Your Visit        MyChart Information     [employer] lets you send messages to your doctor, view your test results, renew your prescriptions, schedule appointments and more. To sign up, go to [email or web address] . Click on \"Log in\" on the left side of the screen, which will take you to the Welcome page. Then click on \"Sign up Now\" on the right side of the page.     You will be asked to enter the access code listed below, as well as some personal information. Please follow the directions to create your username and password.     Your access code is: [email or web address]  Expires: 10/24/2017  5:07 PM     Your access code will  in 90 days. If you need help or a new code, please call your [place] clinic or [phone].        Care EveryWhere ID     This is your [facility] EveryWhere ID. This could be used by other organizations to access your [place] medical records  [id number]        Your Vitals Were     Pulse Temperature Height Last Period Pulse Oximetry Breastfeeding?    72 98  F (36.7  C) (Oral) 5' 5\" (1.651 m) (LMP Unknown) 100% No    BMI (Body Mass Index)                   28.91 kg/m2            Blood Pressure from Last 3 Encounters:   17 126/62   17 130/68   17 126/70    Weight from Last 3 Encounters:   17 173 lb 11.2 oz (78.8 kg)   17 154 lb 1.6 oz (69.9 kg)   17 155 lb 1.6 oz (70.4 kg)              We Performed the Following     Basic metabolic panel  (Ca, Cl, CO2, Creat, Gluc, K, Na, BUN)          Today's Medication Changes          These changes are accurate as of: 17  2:52 PM.  If you have any questions, ask your nurse or doctor.               Start taking these medicines.        Dose/Directions    HYDROcodone-acetaminophen 5-325 MG per tablet   Commonly known as:  NORCO   Used for:  Acute pain of left shoulder   Started by:  Tip Sheppard APRN CNP        Dose: "  0.5-1 tablet   Take 0.5-1 tablets by mouth every 6 hours as needed for moderate to severe pain maximum 2 tablet(s) per day   Quantity:  20 tablet   Refills:  0         These medicines have changed or have updated prescriptions.        Dose/Directions    lisinopril 5 MG tablet   Commonly known as:  PRINIVIL/ZESTRIL   This may have changed:  See the new instructions.   Used for:  Essential hypertension   Changed by:  Tip Sheppard APRN CNP        Dose:  10 mg   Take 2 tablets (10 mg) by mouth daily   Refills:  0            Where to get your medicines      Some of these will need a paper prescription and others can be bought over the counter.  Ask your nurse if you have questions.     Bring a paper prescription for each of these medications     HYDROcodone-acetaminophen 5-325 MG per tablet                Primary Care Provider Office Phone # Fax #    Neisha Esparza [phone] [address] [facility] [address]  [facility] [address]        Goals        General    start date: 4/17/14 I will weigh myself daily and if any weight gain or shortness of breath I will call the clinic. (pt-stated)     Notes - Note created  4/17/2014  3:59 PM by Margareth Pichardo, RN    As of today's date 4/17/2014 goal is met at 0 - 25%.   Goal Status:  Active        Equal Access to Services     TONG TUCKER AH: Hadii sukhdeep mercer hadbeatriceo Somariia, waaxda luqadaha, qaybta kaalmada adeegyada, pamela nolasco. So [facility] [phone].    ATENCIÓN: Si habla español, tiene a gupta disposición servicios gratuitos de asistencia lingüística. Llame al [phone].    We comply with applicable federal civil rights laws and [place] laws. We do not discriminate on the basis of race, color, national origin, age, disability sex, sexual orientation or gender identity.            Thank you!     Thank you for choosing [facility]  for your care. Our goal is always to provide you with excellent care.  Hearing back from our patients is one way we can continue to improve our services. Please take a few minutes to complete the written survey that you may receive in the mail after your visit with us. Thank you!             Your Updated Medication List - Protect others around you: Learn how to safely use, store and throw away your medicines at www.disposemymeds.org.          This list is accurate as of: 8/9/17  2:52 PM.  Always use your most recent med list.                   Brand Name Dispense Instructions for use Diagnosis    ACCU-CHEK SMARTVIEW test strip   Generic drug:  blood glucose monitoring     100 each    1 strip by In Vitro route 2 times daily Or as directed    Type 2 diabetes mellitus with diabetic nephropathy (H)       albuterol 108 (90 BASE) MCG/ACT Inhaler    PROAIR HFA/PROVENTIL HFA/VENTOLIN HFA    1 Inhaler    Inhale 2 puffs into the lungs every 4 hours as needed for shortness of breath / dyspnea or wheezing    COPD (chronic obstructive pulmonary disease) (H)       apixaban ANTICOAGULANT 5 MG tablet    ELIQUIS    180 tablet    Take 1 tablet (5 mg) by mouth 2 times daily    Atrial fibrillation (H)       betamethasone valerate 0.1 % cream    VALISONE    15 g    Apply topically 2 times daily Use sparingly    Rash       blood glucose monitoring lancets     1 Box    USE TO TEST BLOOD SUGAR TWO TIMES A DAY OR AS DIRECTED    Type 2 diabetes mellitus with diabetic nephropathy (H)       COMPOUNDED NON-CONTROLLED SUBSTANCE - PHARMACY TO MIX COMPOUNDED MEDICATION    CMPD RX    42 suppository    Vaginal valium- 10 mg suppository    Vaginal pain, Pelvic floor tension, Urinary retention       fluocinolone 0.01 % solution    SYNALAR          fluticasone 50 MCG/ACT spray    FLONASE    16 mL    INHALE 1 TO 2 SPRAYS INTO BOTH NOSTRILS DAILY    Rhinitis, unspecified type       furosemide 20 MG tablet    LASIX    180 tablet    TAKE 1-2 TABS BY MOUTH ONCE DAILY. MAY REPEAT AFTER NOON IF NEEDED FOR WEIGHT GAIN/2+ EDEMA     Swelling       glipiZIDE 5 MG tablet    GLUCOTROL    135 tablet    TAKE 1 TABLET BY MOUTH EVERY MORNING AND 0.5 TABLET EVERY EVENING    Type 2 diabetes mellitus with diabetic nephropathy, without long-term current use of insulin (H)       HYDROcodone-acetaminophen 5-325 MG per tablet    NORCO    20 tablet    Take 0.5-1 tablets by mouth every 6 hours as needed for moderate to severe pain maximum 2 tablet(s) per day    Acute pain of left shoulder       hydrocortisone 0.5 % cream      Apply topically 3 times daily as needed for itching (on arms) SEND HOME WITH PATIENT        * hydrocortisone 2.5 % cream    ANUSOL-HC    28 g    Place rectally 2 times daily as needed for hemorrhoids    External hemorrhoids       * PROCTOSOL HC 2.5 % cream   Generic drug:  hydrocortisone     28.35 g    PLACE RECTALLY 2 TIMES DAILY AS NEEDED FOR HEMORRHOIDS    Hemorrhoid       * PROCTOSOL HC 2.5 % cream   Generic drug:  hydrocortisone     30 g    PLACE RECTALLY 2 TIMES DAILY AS NEEDED FOR HEMORRHOIDS    External hemorrhoids       hydrOXYzine 25 MG capsule    VISTARIL    180 capsule    Take 1 capsule (25 mg) by mouth 2 times daily as needed for itching    Itching       lisinopril 5 MG tablet    PRINIVIL/ZESTRIL     Take 2 tablets (10 mg) by mouth daily    Essential hypertension       magnesium hydroxide 400 MG/5ML suspension    MILK OF MAGNESIA     Take 30 mLs by mouth At Bedtime        nystatin [id number] UNIT/GM Powd    MYCOSTATIN    60 g    Apply topically 2 times daily as needed    Rash       ondansetron 4 MG ODT tab    ZOFRAN ODT    30 tablet    Take 1 tablet (4 mg) by mouth every 6 hours as needed for nausea    Nausea       order for DME     1 each    Equipment being ordered: AccuChek Smart View strips Patient tests twice daily.    Type II or unspecified type diabetes mellitus without mention of complication, not stated as uncontrolled       PRESERVISION/LUTEIN Caps      Take 1 capsule by mouth 2 times daily        senna-docusate 8.6-50  MG per tablet    SENOKOT-S;PERICOLACE     Take 2 tablets by mouth 2 times daily        simvastatin 10 MG tablet    ZOCOR    90 tablet    Take 1 tablet (10 mg) by mouth At Bedtime    Type 2 diabetes mellitus with diabetic nephropathy, without long-term current use of insulin (H)       * SPIRIVA HANDIHALER IN           * tiotropium 18 MCG capsule    SPIRIVA HANDIHALER    30 capsule    Inhale 1 capsule (18 mcg) into the lungs daily    Chronic obstructive pulmonary disease, unspecified COPD type (H)       traMADol 50 MG tablet    ULTRAM    100 tablet    Take 1 tablet (50 mg) by mouth every 6 hours as needed for moderate pain    Intractable back pain       * Notice:  This list has 5 medication(s) that are the same as other medications prescribed for you. Read the directions carefully, and ask your doctor or other care provider to review them with you.

## 2017-08-09 NOTE — ED PROVIDER NOTES
History     Chief Complaint:  Abnormal Labs    HPI   Helene Gibbs is a 82 year old female who presents from her clinic with abnormal lab values. The patient went to clinic today due to left leg swelling over the past few weeks. The swelling is present from the knee down to her ankle. She takes Lasix, which she states she has been taking as prescribed. She has been trying to eat less salt lately as well. At clinic, she had blood drawn, and her hemoglobin was 6.4, down from 11 in April. The patient does note that she has felt fatigued lately. She does not think that she has required blood transfusions before. Patient lives on her own in an apartment. No black or bloody stools.    Allergies:  Penicillins  Ambien  Definity  Sulfa drugs  Cymbalta  Fluconazole     Medications:    Lisinopril  Norco  Lasix  Spiriva  Ultram  Proctosol  Zocor  Eliquis  Vistaril  Flonase  Glucotrol  Synalar  Valisone  Anusol  Mycostatin  Albuterol  Senna-docusate     Past Medical History:    Arthritis  Bell's palsy  Breast cancer  Colon polyps  CHF  COPD  CAD  DM  GERD  HLD  HTN  Atrial fibrillation  Pulmonary HTN  Tobacco abuse  BCC  Cholelithiasis    Past Surgical History:    Shoulder arthroscopy  Colonoscopy  Ablation AV node  Pacemaker implant  Knee replacement  Laparoscopic cholecystectomy  Breast lumpectomy  Phacoemulsification clear cornea with standard intraocular lens implant    Family History:    HTN  DM  CAD    Social History:  Relationship status:   Tobacco use: Positive (0.5 ppd)  Alcohol use: Negative  The patient presents alone.     Review of Systems   Cardiovascular: Positive for leg swelling.   Gastrointestinal: Negative for blood in stool.   All other systems reviewed and are negative.      Physical Exam   First Vitals:  BP: 124/43  Heart Rate: 70  Temp: 97.4  F (36.3  C)  Resp: 18  Weight: 77.1 kg (170 lb)  SpO2: 99 %    Physical Exam  General: Patient is alert and normal appearing.  HEENT: Head atraumatic     Eyes: pupils equal and reactive. Conjunctiva clear   Nares: patent   Oropharynx: no lesions, uvula midline, no palatal draping, normal voice, no trismus  Neck: Supple without lymphadenopathy, no meningismus  Chest: Heart regular rate and rhythm.   Lungs: Equal clear to auscultation with no wheeze or rales  Abdomen: Soft, non tender, nondistended, normal bowel sounds  Back: No costovertebral angle tenderness, no midline C, T or L spine tenderness  Neuro: Grossly nonfocal, normal speech, strength equal bilaterally, CN 2-12 intact  Extremities: No deformities, equal radial and DP pulses. No clubbing, cyanosis.  2+ bilateral lower extremity edema with no erythema or warmth  Skin: Warm and dry with no rash.   Rectal: dark brown stool noted, non bleeding hemorrhoids.        Emergency Department Course   ECG (16:46:50):  Indication: Abnormal labs.   Rate 70 bpm. AK interval *. QRS duration 144. QT/QTc 438/473. P-R-T axes -73.   Interpretation: Ventricular-paced rhythm.  Agree with computer interpretation.   No significant change compared to EKG dated 12/2/15.   Interpreted at 1700 by Dr. Mahan.    Imaging:  Radiographic findings were communicated with the patient who voiced understanding of the findings.    Chest XR, per radiology:  1. Probable tiny bilateral pleural effusions.  2. No other acute abnormality.     Laboratory:  CBC: WBC 6.5, HGB 6.8 (LL),   CMP: Glucose 115 (H), GFR 37 (L), Creatinine 1.37 (H), o/w WNL  BNP: 3498 (H)  1720: INR: 1.41 (H)  ABO/Rh: ABO O, Rh Pos, Antibody screen negative  Occult blood stool: Positive (A)  UA: Clear, yellow urine: WNL    Interventions:  1823: Lasix, 20 mg, IV    Emergency Department Course:  Nursing notes and vitals reviewed.  I performed an exam of the patient as documented above.  The above workup was undertaken.  1638: I performed a chaperoned rectal exam.   I rechecked the patient and discussed results.  1905: I discussed the patient with Dr. Rodrigues of the  hospitalist service.  Patient given blood transfusions.     Findings and plan explained to the Patient who consents to admission. Discussed the patient with Dr. Rodrigues, who will admit the patient to a Medical telemetry bed for further monitoring, evaluation, and treatment.      Impression & Plan      Medical Decision Making:  Helene Gibbs is a 82 year old female who presents to the ED from clinic for lower extremity edema and abnormal labs. Patient went into clinic for increased swelling over past 2-3 weeks. She tried increasing lasix with home nurse to 20 mg per day, though she states she does not take it every day. She has had continued lower extremity edema from the knee down. There is no erythema, warmth, or tenderness to suggest DVT, and she is on Eliquis for a-fib, making it fairly unlikely she has a DVT. Given symmetric swelling, I think it is unlikely that this is DVT related, and more likely volume overload. BNP is up at 3400. Creatinine is stably abnormal at 1.37. INR is 1.41. She is found to be significantly anemic, with hemoglobin of 6.8, having some fatigue and shortness of breath. Given these findings, I think she warrants blood transfusion, which I have ordered. She will likely need Lasix senior care through as well. She needs diuresis. I gave 20 mg of IV Lasix here, with plan for continued diuresis in the hospital. It does not appear to be acute MI at this time based on EKG. I think it is mild volume overload from congestive heart failure, and not enough Lasix, and inconsistent compliance. Patient is agreeable with the plan for admission. We will diurese, transfuse, and additionally her hemoccult was positive today, so she will need GI consultation for possible GI source of blood loss. IV Protonix was prescribed as well. Patient is agreeable with this plan, and all questions and concerns addressed.    Diagnosis:    ICD-10-CM    1. Symptomatic anemia D64.9 ABO/Rh type and screen     Blood component      Blood component     Blood component     Blood component   2. Bilateral lower extremity edema R60.0      Disposition:  Admit to a Medical Telemetry bed under the care of Dr. Rodrigues.     I, Soy Hanna, am serving as a scribe on 8/9/2017 at 4:26 PM to personally document services performed by Brenda Mahan MD, based on my observations and the provider's statements to me.     EMERGENCY DEPARTMENT       Brenda Mahan MD  08/09/17 4995

## 2017-08-10 ENCOUNTER — APPOINTMENT (OUTPATIENT)
Dept: CARDIOLOGY | Facility: CLINIC | Age: 82
DRG: 811 | End: 2017-08-10
Attending: HOSPITALIST
Payer: MEDICARE

## 2017-08-10 LAB
ALBUMIN SERPL-MCNC: 3 G/DL (ref 3.4–5)
ALP SERPL-CCNC: 54 U/L (ref 40–150)
ALT SERPL W P-5'-P-CCNC: 15 U/L (ref 0–50)
ANION GAP SERPL CALCULATED.3IONS-SCNC: 8 MMOL/L (ref 3–14)
AST SERPL W P-5'-P-CCNC: 17 U/L (ref 0–45)
BASOPHILS # BLD AUTO: 0 10E9/L (ref 0–0.2)
BASOPHILS NFR BLD AUTO: 0.4 %
BILIRUB SERPL-MCNC: 0.8 MG/DL (ref 0.2–1.3)
BLD PROD TYP BPU: NORMAL
BLD UNIT ID BPU: 0
BLOOD PRODUCT CODE: NORMAL
BPU ID: NORMAL
BUN SERPL-MCNC: 21 MG/DL (ref 7–30)
CALCIUM SERPL-MCNC: 9.4 MG/DL (ref 8.5–10.1)
CHLORIDE SERPL-SCNC: 105 MMOL/L (ref 94–109)
CO2 SERPL-SCNC: 26 MMOL/L (ref 20–32)
CREAT SERPL-MCNC: 1.33 MG/DL (ref 0.52–1.04)
DIFFERENTIAL METHOD BLD: ABNORMAL
EOSINOPHIL # BLD AUTO: 0.1 10E9/L (ref 0–0.7)
EOSINOPHIL NFR BLD AUTO: 2.8 %
ERYTHROCYTE [DISTWIDTH] IN BLOOD BY AUTOMATED COUNT: 17 % (ref 10–15)
GFR SERPL CREATININE-BSD FRML MDRD: 38 ML/MIN/1.7M2
GLUCOSE BLDC GLUCOMTR-MCNC: 112 MG/DL (ref 70–99)
GLUCOSE BLDC GLUCOMTR-MCNC: 133 MG/DL (ref 70–99)
GLUCOSE BLDC GLUCOMTR-MCNC: 137 MG/DL (ref 70–99)
GLUCOSE BLDC GLUCOMTR-MCNC: 140 MG/DL (ref 70–99)
GLUCOSE BLDC GLUCOMTR-MCNC: 142 MG/DL (ref 70–99)
GLUCOSE BLDC GLUCOMTR-MCNC: 151 MG/DL (ref 70–99)
GLUCOSE BLDC GLUCOMTR-MCNC: 174 MG/DL (ref 70–99)
GLUCOSE SERPL-MCNC: 113 MG/DL (ref 70–99)
HBA1C MFR BLD: 6.7 % (ref 4.3–6)
HCT VFR BLD AUTO: 25.8 % (ref 35–47)
HGB BLD-MCNC: 8.1 G/DL (ref 11.7–15.7)
IMM GRANULOCYTES # BLD: 0 10E9/L (ref 0–0.4)
IMM GRANULOCYTES NFR BLD: 0.2 %
IRON SATN MFR SERPL: 18 % (ref 15–46)
IRON SERPL-MCNC: 81 UG/DL (ref 35–180)
LYMPHOCYTES # BLD AUTO: 1.1 10E9/L (ref 0.8–5.3)
LYMPHOCYTES NFR BLD AUTO: 23.6 %
MCH RBC QN AUTO: 24.4 PG (ref 26.5–33)
MCHC RBC AUTO-ENTMCNC: 31.4 G/DL (ref 31.5–36.5)
MCV RBC AUTO: 78 FL (ref 78–100)
MONOCYTES # BLD AUTO: 0.4 10E9/L (ref 0–1.3)
MONOCYTES NFR BLD AUTO: 9.3 %
NEUTROPHILS # BLD AUTO: 2.9 10E9/L (ref 1.6–8.3)
NEUTROPHILS NFR BLD AUTO: 63.7 %
NRBC # BLD AUTO: 0 10*3/UL
NRBC BLD AUTO-RTO: 0 /100
PLATELET # BLD AUTO: 191 10E9/L (ref 150–450)
POTASSIUM SERPL-SCNC: 4 MMOL/L (ref 3.4–5.3)
PROT SERPL-MCNC: 6.4 G/DL (ref 6.8–8.8)
RBC # BLD AUTO: 3.32 10E12/L (ref 3.8–5.2)
SODIUM SERPL-SCNC: 139 MMOL/L (ref 133–144)
TIBC SERPL-MCNC: 449 UG/DL (ref 240–430)
TRANSFUSION STATUS PATIENT QL: NORMAL
TRANSFUSION STATUS PATIENT QL: NORMAL
UPPER GI ENDOSCOPY: NORMAL
VIT B12 SERPL-MCNC: 1076 PG/ML (ref 193–986)
WBC # BLD AUTO: 4.6 10E9/L (ref 4–11)

## 2017-08-10 PROCEDURE — 0DB98ZX EXCISION OF DUODENUM, VIA NATURAL OR ARTIFICIAL OPENING ENDOSCOPIC, DIAGNOSTIC: ICD-10-PCS | Performed by: INTERNAL MEDICINE

## 2017-08-10 PROCEDURE — 93306 TTE W/DOPPLER COMPLETE: CPT | Mod: 26 | Performed by: INTERNAL MEDICINE

## 2017-08-10 PROCEDURE — 83540 ASSAY OF IRON: CPT | Performed by: INTERNAL MEDICINE

## 2017-08-10 PROCEDURE — 00000146 ZZHCL STATISTIC GLUCOSE BY METER IP

## 2017-08-10 PROCEDURE — 80053 COMPREHEN METABOLIC PANEL: CPT | Performed by: HOSPITALIST

## 2017-08-10 PROCEDURE — A9270 NON-COVERED ITEM OR SERVICE: HCPCS | Mod: GY | Performed by: INTERNAL MEDICINE

## 2017-08-10 PROCEDURE — 88305 TISSUE EXAM BY PATHOLOGIST: CPT | Performed by: INTERNAL MEDICINE

## 2017-08-10 PROCEDURE — A9270 NON-COVERED ITEM OR SERVICE: HCPCS | Mod: GY | Performed by: HOSPITALIST

## 2017-08-10 PROCEDURE — 12000000 ZZH R&B MED SURG/OB

## 2017-08-10 PROCEDURE — 25000131 ZZH RX MED GY IP 250 OP 636 PS 637: Mod: GY | Performed by: INTERNAL MEDICINE

## 2017-08-10 PROCEDURE — 25500064 ZZH RX 255 OP 636: Performed by: HOSPITALIST

## 2017-08-10 PROCEDURE — P9016 RBC LEUKOCYTES REDUCED: HCPCS | Performed by: EMERGENCY MEDICINE

## 2017-08-10 PROCEDURE — 25000125 ZZHC RX 250: Performed by: HOSPITALIST

## 2017-08-10 PROCEDURE — 99232 SBSQ HOSP IP/OBS MODERATE 35: CPT | Performed by: INTERNAL MEDICINE

## 2017-08-10 PROCEDURE — 83550 IRON BINDING TEST: CPT | Performed by: INTERNAL MEDICINE

## 2017-08-10 PROCEDURE — 25000132 ZZH RX MED GY IP 250 OP 250 PS 637: Mod: GY | Performed by: HOSPITALIST

## 2017-08-10 PROCEDURE — 40000264 ECHO COMPLETE WITH OPTISON

## 2017-08-10 PROCEDURE — 88341 IMHCHEM/IMCYTCHM EA ADD ANTB: CPT | Performed by: INTERNAL MEDICINE

## 2017-08-10 PROCEDURE — 88342 IMHCHEM/IMCYTCHM 1ST ANTB: CPT | Mod: 26 | Performed by: INTERNAL MEDICINE

## 2017-08-10 PROCEDURE — 40000264 ECHO COMPLETE WITH LUMASON

## 2017-08-10 PROCEDURE — 83036 HEMOGLOBIN GLYCOSYLATED A1C: CPT | Performed by: HOSPITALIST

## 2017-08-10 PROCEDURE — 85025 COMPLETE CBC W/AUTO DIFF WBC: CPT | Performed by: HOSPITALIST

## 2017-08-10 PROCEDURE — 36415 COLL VENOUS BLD VENIPUNCTURE: CPT | Performed by: HOSPITALIST

## 2017-08-10 PROCEDURE — G0500 MOD SEDAT ENDO SERVICE >5YRS: HCPCS | Performed by: INTERNAL MEDICINE

## 2017-08-10 PROCEDURE — 36415 COLL VENOUS BLD VENIPUNCTURE: CPT | Performed by: INTERNAL MEDICINE

## 2017-08-10 PROCEDURE — 25000132 ZZH RX MED GY IP 250 OP 250 PS 637: Mod: GY | Performed by: INTERNAL MEDICINE

## 2017-08-10 PROCEDURE — 88305 TISSUE EXAM BY PATHOLOGIST: CPT | Mod: 26 | Performed by: INTERNAL MEDICINE

## 2017-08-10 PROCEDURE — 82607 VITAMIN B-12: CPT | Performed by: INTERNAL MEDICINE

## 2017-08-10 PROCEDURE — 88342 IMHCHEM/IMCYTCHM 1ST ANTB: CPT | Performed by: INTERNAL MEDICINE

## 2017-08-10 PROCEDURE — 25000128 H RX IP 250 OP 636: Performed by: INTERNAL MEDICINE

## 2017-08-10 PROCEDURE — 0DB68ZX EXCISION OF STOMACH, VIA NATURAL OR ARTIFICIAL OPENING ENDOSCOPIC, DIAGNOSTIC: ICD-10-PCS | Performed by: INTERNAL MEDICINE

## 2017-08-10 PROCEDURE — 25000125 ZZHC RX 250: Performed by: INTERNAL MEDICINE

## 2017-08-10 PROCEDURE — 43239 EGD BIOPSY SINGLE/MULTIPLE: CPT | Performed by: INTERNAL MEDICINE

## 2017-08-10 RX ORDER — DEXTROSE MONOHYDRATE 25 G/50ML
25-50 INJECTION, SOLUTION INTRAVENOUS
Status: DISCONTINUED | OUTPATIENT
Start: 2017-08-10 | End: 2017-08-11

## 2017-08-10 RX ORDER — NICOTINE POLACRILEX 4 MG
15-30 LOZENGE BUCCAL
Status: DISCONTINUED | OUTPATIENT
Start: 2017-08-10 | End: 2017-08-11

## 2017-08-10 RX ORDER — PANTOPRAZOLE SODIUM 40 MG/1
40 TABLET, DELAYED RELEASE ORAL
Status: DISCONTINUED | OUTPATIENT
Start: 2017-08-10 | End: 2017-08-11 | Stop reason: HOSPADM

## 2017-08-10 RX ORDER — FENTANYL CITRATE 50 UG/ML
INJECTION, SOLUTION INTRAMUSCULAR; INTRAVENOUS PRN
Status: DISCONTINUED | OUTPATIENT
Start: 2017-08-10 | End: 2017-08-10 | Stop reason: HOSPADM

## 2017-08-10 RX ORDER — LIDOCAINE 40 MG/G
CREAM TOPICAL
Status: DISCONTINUED | OUTPATIENT
Start: 2017-08-10 | End: 2017-08-10 | Stop reason: HOSPADM

## 2017-08-10 RX ADMIN — FLUTICASONE PROPIONATE 1 SPRAY: 50 SPRAY, METERED NASAL at 08:09

## 2017-08-10 RX ADMIN — FUROSEMIDE 40 MG: 40 TABLET ORAL at 08:12

## 2017-08-10 RX ADMIN — OXYCODONE HYDROCHLORIDE AND ACETAMINOPHEN 1 TABLET: 5; 325 TABLET ORAL at 13:00

## 2017-08-10 RX ADMIN — ACETAMINOPHEN 650 MG: 325 TABLET, FILM COATED ORAL at 11:07

## 2017-08-10 RX ADMIN — PANTOPRAZOLE SODIUM 40 MG: 40 TABLET, DELAYED RELEASE ORAL at 19:16

## 2017-08-10 RX ADMIN — OXYCODONE HYDROCHLORIDE AND ACETAMINOPHEN 1 TABLET: 5; 325 TABLET ORAL at 01:28

## 2017-08-10 RX ADMIN — OXYCODONE HYDROCHLORIDE AND ACETAMINOPHEN 1 TABLET: 5; 325 TABLET ORAL at 22:41

## 2017-08-10 RX ADMIN — SULFUR HEXAFLUORIDE 5 ML: KIT at 10:00

## 2017-08-10 RX ADMIN — SIMVASTATIN 10 MG: 10 TABLET, FILM COATED ORAL at 21:33

## 2017-08-10 RX ADMIN — INSULIN ASPART 1 UNITS: 100 INJECTION, SOLUTION INTRAVENOUS; SUBCUTANEOUS at 03:21

## 2017-08-10 RX ADMIN — OXYCODONE HYDROCHLORIDE AND ACETAMINOPHEN 1 TABLET: 5; 325 TABLET ORAL at 08:08

## 2017-08-10 RX ADMIN — ACETAMINOPHEN 650 MG: 325 TABLET, FILM COATED ORAL at 03:20

## 2017-08-10 RX ADMIN — INSULIN ASPART 1 UNITS: 100 INJECTION, SOLUTION INTRAVENOUS; SUBCUTANEOUS at 19:38

## 2017-08-10 RX ADMIN — OXYCODONE HYDROCHLORIDE AND ACETAMINOPHEN 1 TABLET: 5; 325 TABLET ORAL at 17:22

## 2017-08-10 RX ADMIN — PANTOPRAZOLE SODIUM 40 MG: 40 INJECTION, POWDER, FOR SOLUTION INTRAVENOUS at 08:09

## 2017-08-10 NOTE — ED NOTES
[facility]  ED Nurse Handoff Report    ED Chief complaint: Abnormal Labs (sent from [facility] for hgb 6.4 and lower leg swelling )      ED Diagnosis:   Final diagnoses:   Symptomatic anemia   Bilateral lower extremity edema       Code Status: See MD note    Allergies:   Allergies   Allergen Reactions     Penicillins Hives     Ambien [Zolpidem Tartrate]      Hallucinations and fell out of bed at night.     Definity      Caused a syncopal episode.     Sulfa Drugs Itching     Cymbalta Rash     Fluconazole Rash       Activity level - Baseline/Home:  Stand with Assist    Activity Level - Current:   Stand with Assist     Needed?: No    Isolation: No  Infection: Not Applicable    Bariatric?: No    Vital Signs:   Vitals:    08/09/17 1714 08/09/17 1720 08/09/17 1859 08/09/17 1914   BP: 136/56  145/61 136/58   Pulse:   70    Resp:   20 15   Temp:   98.2  F (36.8  C)    TempSrc:       SpO2:  98%  99%   Weight:           Cardiac Rhythm: ,        Pain level: 0-10 Pain Scale: 0    Is this patient confused?: No    Patient Report: Initial Complaint: Patient was at clinic for bilateral leg swelling. She had labs drawn and her hgb was 6.4.   Focused Assessment: Pts hgb was 6.8 here. Pt states she has been feeling fatigued today.  Tests Performed: labs  Abnormal Results:   Results for orders placed or performed during the hospital encounter of 08/09/17 (from the past 24 hour(s))   Occult blood stool (ED Lab POCT Only 3-11)   Result Value Ref Range    Occult Blood Positive (A) NEG   UA reflex to Microscopic and Culture   Result Value Ref Range    Color Urine Yellow     Appearance Urine Clear     Glucose Urine Negative NEG mg/dL    Bilirubin Urine Negative NEG    Ketones Urine Negative NEG mg/dL    Specific Gravity Urine 1.015 1.003 - 1.035    Blood Urine Negative NEG    pH Urine 7.0 5.0 - 7.0 pH    Protein Albumin Urine Negative NEG mg/dL    Urobilinogen Urine 0.2 0.2 - 1.0 EU/dL    Nitrite Urine Negative  NEG    Leukocyte Esterase Urine Negative NEG    Source Midstream Urine    EKG 12-lead, tracing only   Result Value Ref Range    Interpretation ECG Click View Image link to view waveform and result    ABO/Rh type and screen   Result Value Ref Range    Units Ordered 2     ABO O     RH(D)  Pos     Antibody Screen Neg     Test Valid Only At [facility]     Specimen Expires 08/12/2017     Crossmatch Red Blood Cells    Blood component   Result Value Ref Range    Unit Number [id number]     Blood Component Type Red Blood Cells Leukocyte Reduced     Division Number 00     Status of Unit Released to care unit 08/09/2017 1853     Blood Product Code [id number]     Unit Status ISS    Blood component   Result Value Ref Range    Unit Number [id number]     Blood Component Type Red Blood Cells Leukocyte Reduced     Division Number 00     Status of Unit Ready for patient 08/09/2017 1824     Blood Product Code [id number]     Unit Status LAURA    CBC with platelets differential   Result Value Ref Range    WBC 6.5 4.0 - 11.0 10e9/L    RBC Count 3.03 (L) 3.8 - 5.2 10e12/L    Hemoglobin 6.8 (LL) 11.7 - 15.7 g/dL    Hematocrit 23.2 (L) 35.0 - 47.0 %    MCV 77 (L) 78 - 100 fl    MCH 22.4 (L) 26.5 - 33.0 pg    MCHC 29.3 (L) 31.5 - 36.5 g/dL    RDW 16.9 (H) 10.0 - 15.0 %    Platelet Count 234 150 - 450 10e9/L    Diff Method Automated Method     % Neutrophils 74.8 %    % Lymphocytes 16.3 %    % Monocytes 7.3 %    % Eosinophils 1.1 %    % Basophils 0.3 %    % Immature Granulocytes 0.2 %    Nucleated RBCs 0 0 /100    Absolute Neutrophil 4.8 1.6 - 8.3 10e9/L    Absolute Lymphocytes 1.1 0.8 - 5.3 10e9/L    Absolute Monocytes 0.5 0.0 - 1.3 10e9/L    Absolute Eosinophils 0.1 0.0 - 0.7 10e9/L    Absolute Basophils 0.0 0.0 - 0.2 10e9/L    Abs Immature Granulocytes 0.0 0 - 0.4 10e9/L    Absolute Nucleated RBC 0.0    INR   Result Value Ref Range    INR 1.41 (H) 0.86 - 1.14   Comprehensive metabolic panel   Result Value Ref Range     Sodium 135 133 - 144 mmol/L    Potassium 4.3 3.4 - 5.3 mmol/L    Chloride 102 94 - 109 mmol/L    Carbon Dioxide 24 20 - 32 mmol/L    Anion Gap 9 3 - 14 mmol/L    Glucose 115 (H) 70 - 99 mg/dL    Urea Nitrogen 24 7 - 30 mg/dL    Creatinine 1.37 (H) 0.52 - 1.04 mg/dL    GFR Estimate 37 (L) >60 mL/min/1.7m2    GFR Estimate If Black 45 (L) >60 mL/min/1.7m2    Calcium 9.7 8.5 - 10.1 mg/dL    Bilirubin Total 0.5 0.2 - 1.3 mg/dL    Albumin 3.5 3.4 - 5.0 g/dL    Protein Total 7.5 6.8 - 8.8 g/dL    Alkaline Phosphatase 60 40 - 150 U/L    ALT 22 0 - 50 U/L    AST 30 0 - 45 U/L   Nt probnp inpatient (BNP)   Result Value Ref Range    N-Terminal Pro BNP Inpatient 3498 (H) 0 - 1800 pg/mL   XR Chest 2 Views    Narrative    CHEST TWO VIEWS  8/9/2017 5:39 PM      HISTORY: Leg swelling.    COMPARISON: 1/4/2016.    FINDINGS: Left subclavian cardiac device in place. No pneumothorax.  The heart is enlarged without pulmonary edema. The thoracic aorta is  calcified. There is slight blunting of costophrenic angles bilaterally  which may be tiny pleural effusions. There is curvilinear scarring at  the left lung base. The lungs are otherwise clear. Degenerative  disease and prominent kyphosis in the thoracic spine. Vertebroplasty  changes in the lower thoracic spine.      Impression    IMPRESSION:  1. Probable tiny bilateral pleural effusions.  2. No other acute abnormality.    BRISA MCKEON MD     *Note: Due to a large number of results and/or encounters for the requested time period, some results have not been displayed. A complete set of results can be found in Results Review.     Treatments provided: Pt has received 40mg lasix and is receiving unit #1 of PRBCs now.    Family Comments: none    OBS brochure/video discussed/provided to patient: N/A    ED Medications:   Medications   pantoprazole (PROTONIX) 40 mg IV push injection (not administered)   furosemide (LASIX) injection 40 mg (40 mg Intravenous Given 8/9/17 8433)       Drips  infusing?:  Yes      ED NURSE PHONE NUMBER: [phone]     Pt to be NPO at midnight

## 2017-08-10 NOTE — PROCEDURES
GI NOTE (see procedure report)    EGD    No bleeding source.  Biopsied gastric to rule out autoimmune gastritis  Biopsied small intestine to rule out celiac    Plan  Await iron and Vitamin B12 levels  Await biopsies  Outpatient pill camera of the small intestines if above negative.    Helene Bustamante MD  [facility]  Office

## 2017-08-10 NOTE — PLAN OF CARE
Problem: Goal Outcome Summary  Goal: Goal Outcome Summary  Outcome: No Change  A/ox4. Hypertensive, diastolic 150+. Hgb 8.1 after 2 units of blood last night. Bilateral pitten edema in lower extremities. NPO since midnight. PIV SL. Walked to bathroom steadily multiple times with SBA. Echocardiogram and EGD done today. Discharge pending Hbg improvement. Continue to monitor.

## 2017-08-10 NOTE — PLAN OF CARE
Problem: Goal Outcome Summary  Goal: Goal Outcome Summary  Outcome: No Change  A/ox4. Hypertensive, diastolic 150+. Received 1 unit of blood overnight, recheck Hbg in morning. Swollen bilateral lower extremities from the knee down. NPO starting at midnight. PIV SL. Walked to bathroom x1 with SBA. Pt requested Milk of Mag, hospitalist said to wait until GI sees patient. Discharge pending Hbg improvement. Continue to monitor.

## 2017-08-10 NOTE — PROGRESS NOTES
[facility]  Patient is currently open to home care services with [facility].  The patient requests private pay PRN nurse and HHA visits, however she is not currently receiving any services.  [facility]  and team have been notified of patient admission.  [facility] liaison will continue to follow patient during stay.  If patient is appropriate for skilled services at time of discharge, please provide Face to Face documentation and orders for home care at time of discharge.

## 2017-08-10 NOTE — H&P
[facility]    History and Physical  Hospitalist       Date of Admission:  8/9/2017    Assessment & Plan   Helene Gibbs is a 82 year old female with past medical history of atrial fibrillation and his hypertension COPD diabetes type 2 presented from the clinic for abnormal labs with hemoglobin 6.4,denies any bleeding she had a positive Hemoccult, receiving one unit of blood and GI consult, also she has worsening leg swelling .    1.  Anemia:  Denies any bleeding  On eliquis  for atrial fibrillation  Hemodynamically stable  Hemoccult-positive  Plan: One unit of blood was ordered in the emergency, hold eliquis ,  Protonix IV b.i.d.,  GI consult will need EGD, patient to have colonoscopy couple months ago showed  Impression:          - Small internal anal papilla found on digital rectal                        exam.                        - One 2 mm polyp in the transverse colon. Resected and                        retrieved.                        - One 3 mm polyp in the sigmoid colon. Resected and                        retrieved.                        - Diverticulosis in the sigmoid colon.                        - The examination was otherwise normal on direct and                        retroflexion views.     2.  Atrial fibrillation rate controlled not on any medication hold eliquis  Above    3. Hypertension blood pressure stable, hold  lisinopril in setting bleeding    4.  Asthma COPD stable continue nebs     5.  Worsening of leg swelling denies any history of CHF, continue Lasix and get echo    6.  Diabetes taking glipizide at home now otherwise n.p.o. sliding scale insulin    Due to prophylaxis SCD and stenting of bleeding        Summary:Helene Gibbs is a 82 year old female with past medical history of atrial fibrillation and his hypertension COPD diabetes type 2 presented from the clinic for abnormal labs with hemoglobin 6.4,denies any bleeding she had a positive Hemoccult, receiving one  unit of blood and GI consult   -    DVT Prophylaxis: Pneumatic Compression Devices  Code Status: Full Code    Disposition: Expected discharge in 1-2 days once Hb stable     Jules Rodrigues MD    Primary Care Physician   *Neisha Esparza    Chief Complaint   Anemia     History is obtained from the patient    History of Present Illness   Helene Gibbs is a 82 year old female with past medical history of atrial fibrillation and his hypertension COPD diabetes type 2 presented from the clinic for abnormal labs with hemoglobin 6.4,denies any bleeding she had a positive Hemoccult, states she feeling tired in the last couple weeks, denies any dizziness chest pain or shortness of breath, in the last week she noticed worsening of leg swelling,no  history of CHF and her Lasix dose was increased recently.  Denies history of anemia, denies any other symptoms at this time .    Past Medical History    Past Medical History:   Diagnosis Date     Anemia      Ankle arthritis      Arthritis      Back pain      Bell's palsy     left     Breast CA (H) -    left lumpectomy, radiation, -recurrence     Colon polyps     colonoscopy--next due in      Congestive heart failure (H)      COPD (chronic obstructive pulmonary disease) (H)      Coronary artery disease      DM (diabetes mellitus) (H)      GERD (gastroesophageal reflux disease)      Hyperlipidemia      Hypertension      Lumbar spinal stenosis     use cane     Obesity      Osteoporosis      Other chronic pain      Pacemaker      Permanent atrial fibrillation (H) 2008    S/P AV node ablation and pacemaker 10-25-12       Pleural effusion      Pulmonary hypertension (H)      Rectal stenosis      Tobacco abuse     current       Prior to Admission Medications   Prior to Admission Medications   Prescriptions Last Dose Informant Patient Reported? Taking?   ACCU-CHEK SMARTVIEW test strip   No No   Si strip by In Vitro route 2 times daily Or as directed   COMPOUND  (CMPD RX) - PHARMACY TO MIX COMPOUNDED MEDICATION   No No   Sig: Vaginal valium- 10 mg suppository   HYDROcodone-acetaminophen (NORCO) 5-325 MG per tablet   No No   Sig: Take 0.5-1 tablets by mouth every 6 hours as needed for moderate to severe pain maximum 2 tablet(s) per day   Multiple Vitamins-Minerals (PRESERVISION/LUTEIN) CAPS   Yes No   Sig: Take 1 capsule by mouth 2 times daily   ORDER FOR DME, SET TO LOCAL PRINT,   No No   Sig: Equipment being ordered: AccuChek Smart View strips  Patient tests twice daily.   PROCTOSOL HC 2.5 % cream   No No   Sig: PLACE RECTALLY 2 TIMES DAILY AS NEEDED FOR HEMORRHOIDS   PROCTOSOL HC 2.5 % rectal cream   No No   Sig: PLACE RECTALLY 2 TIMES DAILY AS NEEDED FOR HEMORRHOIDS   Tiotropium Bromide Monohydrate (SPIRIVA HANDIHALER IN)   Yes No   albuterol (PROAIR HFA, PROVENTIL HFA, VENTOLIN HFA) 108 (90 BASE) MCG/ACT inhaler   No No   Sig: Inhale 2 puffs into the lungs every 4 hours as needed for shortness of breath / dyspnea or wheezing   apixaban ANTICOAGULANT (ELIQUIS) 5 MG tablet   No No   Sig: Take 1 tablet (5 mg) by mouth 2 times daily   betamethasone valerate (VALISONE) 0.1 % cream   No No   Sig: Apply topically 2 times daily Use sparingly   blood glucose monitoring (ACCU-CHEK FASTCLIX) lancets   No No   Sig: USE TO TEST BLOOD SUGAR TWO TIMES A DAY OR AS DIRECTED   fluocinolone (SYNALAR) 0.01 % external solution   Yes No   fluticasone (FLONASE) 50 MCG/ACT spray   No No   Sig: INHALE 1 TO 2 SPRAYS INTO BOTH NOSTRILS DAILY   furosemide (LASIX) 20 MG tablet   No No   Sig: TAKE 1-2 TABS BY MOUTH ONCE DAILY. MAY REPEAT AFTER NOON IF NEEDED FOR WEIGHT GAIN/2+ EDEMA   glipiZIDE (GLUCOTROL) 5 MG tablet   No No   Sig: TAKE 1 TABLET BY MOUTH EVERY MORNING AND 0.5 TABLET EVERY EVENING   hydrOXYzine (VISTARIL) 25 MG capsule   No No   Sig: Take 1 capsule (25 mg) by mouth 2 times daily as needed for itching   hydrocortisone (ANUSOL-HC) 2.5 % rectal cream   No No   Sig: Place rectally 2  times daily as needed for hemorrhoids   hydrocortisone 0.5 % cream  Self Yes No   Sig: Apply topically 3 times daily as needed for itching (on arms) SEND HOME WITH PATIENT   lisinopril (PRINIVIL/ZESTRIL) 5 MG tablet   Yes No   Sig: Take 2 tablets (10 mg) by mouth daily   magnesium hydroxide (MILK OF MAGNESIA) 400 MG/5ML suspension   Yes No   Sig: Take 30 mLs by mouth At Bedtime    nystatin (MYCOSTATIN) 345704 UNIT/GM POWD   No No   Sig: Apply topically 2 times daily as needed   ondansetron (ZOFRAN ODT) 4 MG ODT tab   No No   Sig: Take 1 tablet (4 mg) by mouth every 6 hours as needed for nausea   senna-docusate (SENOKOT-S;PERICOLACE) 8.6-50 MG per tablet   Yes No   Sig: Take 2 tablets by mouth 2 times daily   simvastatin (ZOCOR) 10 MG tablet   No No   Sig: Take 1 tablet (10 mg) by mouth At Bedtime   tiotropium (SPIRIVA HANDIHALER) 18 MCG capsule   No No   Sig: Inhale 1 capsule (18 mcg) into the lungs daily   traMADol (ULTRAM) 50 MG tablet   No No   Sig: Take 1 tablet (50 mg) by mouth every 6 hours as needed for moderate pain      Facility-Administered Medications: None     Allergies   Allergies   Allergen Reactions     Penicillins Hives     Ambien [Zolpidem Tartrate]      Hallucinations and fell out of bed at night.     Definity      Caused a syncopal episode.     Sulfa Drugs Itching     Cymbalta Rash     Fluconazole Rash       Social History   Helene Gibbs  reports that she has been smoking Cigarettes.  She has a 22.50 pack-year smoking history. She has never used smokeless tobacco. She reports that she does not drink alcohol or use illicit drugs.    Family History   Helene Gibbs family history includes C.A.D. in her father; DIABETES in her mother; Hypertension in her mother. There is no history of Breast Cancer or Colon Cancer.    Review of Systems   The 10 point Review of Systems is negative other than noted in the HPI     Physical Exam   Temp: 98.2  F (36.8  C) Temp src: Oral BP: 136/58 Pulse: 70 Heart  Rate: 70 Resp: 15 SpO2: 99 % O2 Device: None (Room air)    Vital Signs with Ranges  Temp:  [97.4  F (36.3  C)-98.2  F (36.8  C)] 98.2  F (36.8  C)  Pulse:  [70-72] 70  Heart Rate:  [70] 70  Resp:  [15-20] 15  BP: (124-145)/(43-62) 136/58  SpO2:  [98 %-100 %] 99 %  170 lbs 0 oz    Constitutional: Awake, alert, cooperative, no apparent distress.  Eyes: pale onjunctiva and pupils examined and normal.  HEENT: Moist mucous membranes, normal dentition.  Respiratory: Clear to auscultation bilaterally, no crackles or wheezing.  Cardiovascular: irRegular rate and rhythm, normal S1 and S2, and+ murmur noted.  GI: Soft, non-distended, non-tender, normal bowel sounds.  Lymph/Hematologic: No anterior cervical or supraclavicular adenopathy.  Skin: No rashes, no cyanosis, no edema.  Musculoskeletal: No joint swelling, erythema or tenderness.  Neurologic: Cranial nerves 2-12 intact, normal strength and sensation.  Psychiatric: Alert, oriented to person, place and time, no obvious anxiety or depression.    Data       Recent Labs  Lab 08/09/17  1720 08/09/17  1457   WBC 6.5 7.4   HGB 6.8* 6.4*  6.6*   MCV 77* 79    299   INR 1.41*  --     134   POTASSIUM 4.3 4.5   CHLORIDE 102 102   CO2 24 23   BUN 24 24   CR 1.37* 1.46*   ANIONGAP 9 9   GERARD 9.7 10.0   * 110*   ALBUMIN 3.5  --    PROTTOTAL 7.5  --    BILITOTAL 0.5  --    ALKPHOS 60  --    ALT 22  --    AST 30  --        Imaging:  Recent Results (from the past 24 hour(s))   XR Chest 2 Views    Narrative    CHEST TWO VIEWS  8/9/2017 5:39 PM      HISTORY: Leg swelling.    COMPARISON: 1/4/2016.    FINDINGS: Left subclavian cardiac device in place. No pneumothorax.  The heart is enlarged without pulmonary edema. The thoracic aorta is  calcified. There is slight blunting of costophrenic angles bilaterally  which may be tiny pleural effusions. There is curvilinear scarring at  the left lung base. The lungs are otherwise clear. Degenerative  disease and prominent  kyphosis in the thoracic spine. Vertebroplasty  changes in the lower thoracic spine.      Impression    IMPRESSION:  1. Probable tiny bilateral pleural effusions.  2. No other acute abnormality.    BRISA MCKEON MD

## 2017-08-10 NOTE — PLAN OF CARE
Problem: Goal Outcome Summary  Goal: Goal Outcome Summary  Outcome: No Change  A/ox4. Hypertensive at times, other VSS. Received 1 unit of blood in ED this evening, recheck Hbg in morning. Swollen bilateral lower extremities from the knee down. NPO starting at midnight. PIV SL. Walked to bathroom x3 with SBA. Discharge pending Hbg improvement. Continue to monitor.

## 2017-08-10 NOTE — PROGRESS NOTES
[facility]    Hospitalist Progress Note    Assessment & Plan   Helene Gibbs is a 82 year old female who was admitted on 8/9/2017.     Helene Gibbs is a 82 year old female with past medical history of atrial fibrillation and his hypertension COPD diabetes type 2 presented from the clinic for abnormal labs with hemoglobin 6.4,denies any bleeding she had a positive Hemoccult, receiving one unit of blood and GI consult, also she has worsening leg swelling .     1.  Anemia:  Denies any bleeding  On eliquis  for atrial fibrillation  Hemodynamically stable  Hemoccult-positive  Plan: One unit of blood was ordered in the emergency, hold eliquis ,  Protonix IV b.i.d.,  GI consult will need EGD, patient to have colonoscopy couple months ago showed  Impression:          - Small internal anal papilla found on digital rectal                        exam.                        - One 2 mm polyp in the transverse colon. Resected and                        retrieved.                        - One 3 mm polyp in the sigmoid colon. Resected and                        retrieved.                        - Diverticulosis in the sigmoid colon.                        - The examination was otherwise normal on direct and                        retroflexion     -Pt has had 2 units of pRBC  -await GI consult  -hgb today 8.1 down from 6.8 but had the 2 units of blood         2.  Atrial fibrillation rate controlled not on any medication hold eliquis  Above     3. Hypertension blood pressure stable, hold  lisinopril in setting bleeding     4.  Asthma COPD stable continue nebs      5.  Worsening of leg swelling denies any history of CHF, continue Lasix   -echo this am await interpretation     6.  Diabetes taking glipizide at home now otherwise n.p.o. sliding scale insulin  -A1c 6.7  -glucose today 113, 140       Due to prophylaxis SCD and stenting of bleeding           Summary:Helene Gibbs is a 82 year old female with past  medical history of atrial fibrillation and his hypertension COPD diabetes type 2 presented from the clinic for abnormal labs with hemoglobin 6.4,denies any bleeding she had a positive Hemoccult, receiving one unit of blood and GI consult   -     DVT Prophylaxis: Pneumatic Compression Devices  Code Status: Full Code     Disposition: Expected discharge in 1-2 days once Hb stable         Michelle Zuniga MD    Interval History   No complaints of bleeding.  Pt does not want any further colonoscopies.  Denies chest pain, abd pain or SOB    -Data reviewed today: I reviewed all new labs and imaging results over the last 24 hours. I personally reviewed no images or EKG's today.    Physical Exam   Temp: 97.1  F (36.2  C) Temp src: Axillary BP: 132/57 Pulse: 70 Heart Rate: 70 Resp: 18 SpO2: 96 % O2 Device: None (Room air)    Vitals:    08/09/17 1603 08/10/17 0400   Weight: 77.1 kg (170 lb) 77.3 kg (170 lb 6.7 oz)     Vital Signs with Ranges  Temp:  [97.1  F (36.2  C)-99  F (37.2  C)] 97.1  F (36.2  C)  Pulse:  [70-72] 70  Heart Rate:  [70-75] 70  Resp:  [15-20] 18  BP: (124-171)/(43-70) 132/57  SpO2:  [95 %-100 %] 96 %  I/O last 3 completed shifts:  In: 600   Out: 1515 [Urine:1515]    Constitutional: alert   Respiratory: clear to auscultation, no wheezing or rhonchi   Cardiovascular: regular rate and rhythm without murmer or rub   GI:positive bowel sounds, non-tender   Skin/Integumen: no rashes    Other:  edema 2-3+ on right, 2+ left      Medications        insulin aspart  1-6 Units Subcutaneous Q4H     fluticasone  1 spray Both Nostrils Daily     simvastatin  10 mg Oral At Bedtime     pantoprazole  40 mg Intravenous BID     furosemide  40 mg Oral Daily     umeclidinium  1 puff Inhalation Daily       Data     Recent Labs  Lab 08/10/17  0716 08/09/17  1720 08/09/17  1457   WBC 4.6 6.5 7.4   HGB 8.1* 6.8* 6.4*  6.6*   MCV 78 77* 79    234 299   INR  --  1.41*  --     135 134   POTASSIUM 4.0 4.3 4.5   CHLORIDE  105 102 102   CO2 26 24 23   BUN 21 24 24   CR 1.33* 1.37* 1.46*   ANIONGAP 8 9 9   GERARD 9.4 9.7 10.0   * 115* 110*   ALBUMIN 3.0* 3.5  --    PROTTOTAL 6.4* 7.5  --    BILITOTAL 0.8 0.5  --    ALKPHOS 54 60  --    ALT 15 22  --    AST 17 30  --        Recent Results (from the past 24 hour(s))   XR Chest 2 Views    Narrative    CHEST TWO VIEWS  8/9/2017 5:39 PM      HISTORY: Leg swelling.    COMPARISON: 1/4/2016.    FINDINGS: Left subclavian cardiac device in place. No pneumothorax.  The heart is enlarged without pulmonary edema. The thoracic aorta is  calcified. There is slight blunting of costophrenic angles bilaterally  which may be tiny pleural effusions. There is curvilinear scarring at  the left lung base. The lungs are otherwise clear. Degenerative  disease and prominent kyphosis in the thoracic spine. Vertebroplasty  changes in the lower thoracic spine.      Impression    IMPRESSION:  1. Probable tiny bilateral pleural effusions.  2. No other acute abnormality.    BRISA MCKEON MD

## 2017-08-10 NOTE — PROVIDER NOTIFICATION
MD Notification    Notified Person:  MD    Notified Persons Name: Dr Rodrigues    Notification Date/Time: 2130 8.9.17    Notification Interaction:  Talked with Physician    Purpose of Notification: Diet Order, Continuous Fluids, & Tele Monitor    Orders Received: Regular Diet until midnight, No continuous fluids orders, Start Tele Monitoring    Comments:

## 2017-08-10 NOTE — PHARMACY-ADMISSION MEDICATION HISTORY
Admission Medication History    Admission medication history interview status for the 8/9/2017 admission is complete. See EPIC admission navigator for prior to admission medications     Medication history source reliability:Good    Actions taken by pharmacist (provider contacted, etc):None     Additional medication history information not noted on PTA med list :None    Medication reconciliation/reorder completed by provider prior to medication history? Yes    Time spent in this activity: 15 minutes    Prior to Admission medications    Medication Sig Last Dose Taking? Auth Provider   lisinopril (PRINIVIL/ZESTRIL) 5 MG tablet Take 2 tablets (10 mg) by mouth daily 8/9/2017 at AM Yes Tip Sheppard APRN CNP   GLIPIZIDE PO Take 5 mg by mouth every morning (before breakfast) 8/9/2017 at AM Yes Unknown, Entered By History   furosemide (LASIX) 20 MG tablet TAKE 1-2 TABS BY MOUTH ONCE DAILY. MAY REPEAT AFTER NOON IF NEEDED FOR WEIGHT GAIN/2+ EDEMA 8/9/2017 at 20 mg Yes Neisha Esparza MD   tiotropium (SPIRIVA HANDIHALER) 18 MCG capsule Inhale 1 capsule (18 mcg) into the lungs daily 8/9/2017 at AM Yes Neisha Esparza MD   traMADol (ULTRAM) 50 MG tablet Take 1 tablet (50 mg) by mouth every 6 hours as needed for moderate pain  at PRN Yes Neisha Esparza MD   simvastatin (ZOCOR) 10 MG tablet Take 1 tablet (10 mg) by mouth At Bedtime 8/8/2017 at HS Yes Neisha Esparza MD   apixaban ANTICOAGULANT (ELIQUIS) 5 MG tablet Take 1 tablet (5 mg) by mouth 2 times daily 8/9/2017 at AM Yes Jc Juarez MD   hydrOXYzine (VISTARIL) 25 MG capsule Take 1 capsule (25 mg) by mouth 2 times daily as needed for itching  at PRN Yes Neisha Esparza MD   fluticasone (FLONASE) 50 MCG/ACT spray INHALE 1 TO 2 SPRAYS INTO BOTH NOSTRILS DAILY 8/8/2017 at HS Yes Neisha Esparza MD   COMPOUND (CMPD RX) - PHARMACY TO MIX COMPOUNDED MEDICATION Vaginal valium- 10 mg suppository  at PRN Yes Merle Dominguez PA-C    fluocinolone (SYNALAR) 0.01 % external solution Apply topically daily as needed (itchy scalp)   at PRN Yes Reported, Patient   betamethasone valerate (VALISONE) 0.1 % cream Apply topically 2 times daily Use sparingly  at PRN Yes Neisha Esparza MD   hydrocortisone (ANUSOL-HC) 2.5 % rectal cream Place rectally 2 times daily as needed for hemorrhoids  at PRN Yes Neisha Esparza MD   Multiple Vitamins-Minerals (PRESERVISION/LUTEIN) CAPS Take 1 capsule by mouth daily  8/9/2017 at Noon Yes Reported, Patient   nystatin (MYCOSTATIN) 633176 UNIT/GM POWD Apply topically 2 times daily as needed  at PRN Yes Neisha Esparza MD   albuterol (PROAIR HFA, PROVENTIL HFA, VENTOLIN HFA) 108 (90 BASE) MCG/ACT inhaler Inhale 2 puffs into the lungs every 4 hours as needed for shortness of breath / dyspnea or wheezing  at prn Yes Neisha Esparza MD   magnesium hydroxide (MILK OF MAGNESIA) 400 MG/5ML suspension Take 30 mLs by mouth At Bedtime  8/8/2017 at HS Yes Reported, Patient   HYDROcodone-acetaminophen (NORCO) 5-325 MG per tablet Take 0.5-1 tablets by mouth every 6 hours as needed for moderate to severe pain maximum 2 tablet(s) per day  [facility]   ACCU-CHEK SMARTVIEW test strip 1 strip by In Vitro route 2 times daily Or as directed   Neisha Esparza MD   blood glucose monitoring (ACCU-CHEK FASTCLIX) lancets USE TO TEST BLOOD SUGAR TWO TIMES A DAY OR AS DIRECTED   Neisha Esparza MD   ORDER FOR DME, SET TO LOCAL PRINT, Equipment being ordered: AccuChek Smart View strips  Patient tests twice daily.   Neisha Esparza MD David S. Cline, PharmD

## 2017-08-11 VITALS
HEART RATE: 70 BPM | DIASTOLIC BLOOD PRESSURE: 72 MMHG | RESPIRATION RATE: 18 BRPM | SYSTOLIC BLOOD PRESSURE: 141 MMHG | OXYGEN SATURATION: 94 % | BODY MASS INDEX: 27.99 KG/M2 | WEIGHT: 168.2 LBS | TEMPERATURE: 97.4 F

## 2017-08-11 LAB
ERYTHROCYTE [DISTWIDTH] IN BLOOD BY AUTOMATED COUNT: 17.3 % (ref 10–15)
FOLATE SERPL-MCNC: 17.8 NG/ML
GLUCOSE BLDC GLUCOMTR-MCNC: 114 MG/DL (ref 70–99)
GLUCOSE BLDC GLUCOMTR-MCNC: 119 MG/DL (ref 70–99)
GLUCOSE BLDC GLUCOMTR-MCNC: 89 MG/DL (ref 70–99)
HCT VFR BLD AUTO: 28.1 % (ref 35–47)
HGB BLD-MCNC: 8.6 G/DL (ref 11.7–15.7)
MCH RBC QN AUTO: 23.9 PG (ref 26.5–33)
MCHC RBC AUTO-ENTMCNC: 30.6 G/DL (ref 31.5–36.5)
MCV RBC AUTO: 78 FL (ref 78–100)
PLATELET # BLD AUTO: 175 10E9/L (ref 150–450)
RBC # BLD AUTO: 3.6 10E12/L (ref 3.8–5.2)
WBC # BLD AUTO: 5.2 10E9/L (ref 4–11)

## 2017-08-11 PROCEDURE — 99238 HOSP IP/OBS DSCHRG MGMT 30/<: CPT | Performed by: INTERNAL MEDICINE

## 2017-08-11 PROCEDURE — 82746 ASSAY OF FOLIC ACID SERUM: CPT | Performed by: INTERNAL MEDICINE

## 2017-08-11 PROCEDURE — 36415 COLL VENOUS BLD VENIPUNCTURE: CPT | Performed by: INTERNAL MEDICINE

## 2017-08-11 PROCEDURE — A9270 NON-COVERED ITEM OR SERVICE: HCPCS | Mod: GY | Performed by: HOSPITALIST

## 2017-08-11 PROCEDURE — 25000132 ZZH RX MED GY IP 250 OP 250 PS 637: Mod: GY | Performed by: INTERNAL MEDICINE

## 2017-08-11 PROCEDURE — 85027 COMPLETE CBC AUTOMATED: CPT | Performed by: INTERNAL MEDICINE

## 2017-08-11 PROCEDURE — A9270 NON-COVERED ITEM OR SERVICE: HCPCS | Mod: GY | Performed by: INTERNAL MEDICINE

## 2017-08-11 PROCEDURE — 00000146 ZZHCL STATISTIC GLUCOSE BY METER IP

## 2017-08-11 PROCEDURE — 99207 ZZC CDG-CODE INCORRECT PER BILLING BASED ON TIME: CPT | Performed by: INTERNAL MEDICINE

## 2017-08-11 PROCEDURE — 25000132 ZZH RX MED GY IP 250 OP 250 PS 637: Mod: GY | Performed by: HOSPITALIST

## 2017-08-11 RX ORDER — DEXTROSE MONOHYDRATE 25 G/50ML
25-50 INJECTION, SOLUTION INTRAVENOUS
Status: DISCONTINUED | OUTPATIENT
Start: 2017-08-11 | End: 2017-08-11 | Stop reason: HOSPADM

## 2017-08-11 RX ORDER — NICOTINE POLACRILEX 4 MG
15-30 LOZENGE BUCCAL
Status: DISCONTINUED | OUTPATIENT
Start: 2017-08-11 | End: 2017-08-11 | Stop reason: HOSPADM

## 2017-08-11 RX ADMIN — FLUTICASONE PROPIONATE 1 SPRAY: 50 SPRAY, METERED NASAL at 08:08

## 2017-08-11 RX ADMIN — OXYCODONE HYDROCHLORIDE AND ACETAMINOPHEN 1 TABLET: 5; 325 TABLET ORAL at 06:23

## 2017-08-11 RX ADMIN — OXYCODONE HYDROCHLORIDE AND ACETAMINOPHEN 1 TABLET: 5; 325 TABLET ORAL at 02:26

## 2017-08-11 RX ADMIN — FUROSEMIDE 40 MG: 40 TABLET ORAL at 08:09

## 2017-08-11 RX ADMIN — PANTOPRAZOLE SODIUM 40 MG: 40 TABLET, DELAYED RELEASE ORAL at 06:23

## 2017-08-11 NOTE — PROGRESS NOTES
[facility] [place]    Hospitalist Progress Note    Assessment & Plan   Helene Gibbs is a 82 year old female who was admitted on 8/9/2017.     Helene Gibbs is a 82 year old female who was admitted on 8/9/2017.      Helene Gibbs is a 82 year old female with past medical history of atrial fibrillation and his hypertension COPD diabetes type 2 presented from the clinic for abnormal labs with hemoglobin 6.4,denies any bleeding she had a positive Hemoccult, receiving one unit of blood and GI consult, also she has worsening leg swelling .      1.  Anemia:  Denies any bleeding  On eliquis  for atrial fibrillation  Hemodynamically stable  Hemoccult-positive  Plan: One unit of blood was ordered in the emergency, hold eliquis ,  Protonix IV b.i.d.,  GI consult will need EGD, patient to have colonoscopy couple months ago showed  Impression:          - Small internal anal papilla found on digital rectal                        exam.                        - One 2 mm polyp in the transverse colon. Resected and                        retrieved.                        - One 3 mm polyp in the sigmoid colon. Resected and                        retrieved.                        - Diverticulosis in the sigmoid colon.                        - The examination was otherwise normal on direct and                        retroflexion      EGD without bleeding  BX taken from gastric and duodenum  Hgb stable  Fe 81, , %sat 18, B12 1076  GI suggests heme/onc consult  Will have Dr. Cam arrange when pt is see next Monday            2.  Atrial fibrillation rate controlled not on any medication hold eliquis  Above  -hold eliquis until seen by PMD      3. Hypertension blood pressure stable, hold  lisinopril in setting bleeding  But restart now      4.  Asthma COPD stable continue nebs       5.  Worsening of leg swelling denies any history of CHF, continue Lasix   -echo EF 55%, severe NYDIA, ,pd TR mild pulm HTN  Most  likely HFpEF  -counselled pt about BP and also low salt diet  Improved edema today  Weight 76.3<77.3        6.  Diabetes taking glipizide at home now otherwise n.p.o. sliding scale insulin           Due to prophylaxis SCD and stenting of bleeding       -      DVT Prophylaxis: Pneumatic Compression Devices  Code Status: Full Code      Disposition: d/c today      Michelle Zuniga MD    Interval History   Wants to go home    -Data reviewed today: I reviewed all new labs and imaging results over the last 24 hours. I personally reviewed no images or EKG's today.    Physical Exam   Temp: 97.4  F (36.3  C) Temp src: Oral BP: 141/72 Pulse: 70 Heart Rate: 70 Resp: 18 SpO2: 94 % O2 Device: None (Room air)    Vitals:    08/09/17 1603 08/10/17 0400 08/11/17 0610   Weight: 77.1 kg (170 lb) 77.3 kg (170 lb 6.7 oz) 76.3 kg (168 lb 3.2 oz)     Vital Signs with Ranges  Temp:  [97.1  F (36.2  C)-98.2  F (36.8  C)] 97.4  F (36.3  C)  Pulse:  [70] 70  Heart Rate:  [69-75] 70  Resp:  [8-46] 18  BP: (133-163)/(56-81) 141/72  SpO2:  [93 %-100 %] 94 %  I/O last 3 completed shifts:  In: 240 [P.O.:240]  Out: 1150 [Urine:1150]    Constitutional: alert   Respiratory: clear to auscultation, no wheezing or rhonchi   Cardiovascular: regular rate and rhythm without murmer or rub   GI:positive bowel sounds, non-tender   Skin/Integumen: no rashes    Other:        Medications        insulin aspart  1-6 Units Subcutaneous Q4H     pantoprazole  40 mg Oral BID AC     fluticasone  1 spray Both Nostrils Daily     simvastatin  10 mg Oral At Bedtime     furosemide  40 mg Oral Daily     umeclidinium  1 puff Inhalation Daily       Data     Recent Labs  Lab 08/11/17  0722 08/10/17  0716 08/09/17  1720 08/09/17  1457   WBC 5.2 4.6 6.5 7.4   HGB 8.6* 8.1* 6.8* 6.4*  6.6*   MCV 78 78 77* 79    191 234 299   INR  --   --  1.41*  --    NA  --  139 135 134   POTASSIUM  --  4.0 4.3 4.5   CHLORIDE  --  105 102 102   CO2  --  26 24 23   BUN  --  21 24 24    CR  --  1.33* 1.37* 1.46*   ANIONGAP  --  8 9 9   GERARD  --  9.4 9.7 10.0   GLC  --  113* 115* 110*   ALBUMIN  --  3.0* 3.5  --    PROTTOTAL  --  6.4* 7.5  --    BILITOTAL  --  0.8 0.5  --    ALKPHOS  --  54 60  --    ALT  --  15 22  --    AST  --  17 30  --        No results found for this or any previous visit (from the past 24 hour(s)).

## 2017-08-11 NOTE — PLAN OF CARE
Problem: Goal Outcome Summary  Goal: Goal Outcome Summary  Outcome: Adequate for Discharge Date Met:  08/11/17  Adequate for discharge. Edema decrease. GI signed off. Pt will follow up with primary care provider out patient. Pt education complete with patient. Belongings sent with pt, transported out by NA and RN, and discharged home with family.

## 2017-08-11 NOTE — DISCHARGE SUMMARY
PRIMARY CARE PHYSICIAN:  Dr. Esparza.        DATE OF ADMISSION:  8/9/2017.      DATE OF DISCHARGE:  8/11/2017.      DISCHARGE DIAGNOSES:   1.  Anemia, unclear etiology.   2.  Atrial fibrillation, rate controlled.   3.  Hypertension.   4.  Asthma.     5.  Diabetes.   6.  Probable acute diastolic congestive heart failure with lower extremity edema, requiring increased diuretics     DISCHARGE MEDICATIONS:  The same as on admission with the exception of holding Eliquis these include:   1.  Albuterol 2 puffs q.4 h. p.r.n.    2.  Beclomethasone cream.     3.  Sincalide solution topically daily p.r.n.   4.  Fluticasone 250 mcg per actuation 1-2 sprays both nostrils daily.   5.  Lasix 20 mg, take 1-2 tabs once daily, may repeat afternoon if needed for weight gain.   6.  Glipizide 5 mg daily.   7.  Norco 5/325, 0.5-1 tablet 1 tablet every 6 hours p.r.n.   8.  Hydrocortisone cream p.r.n.   9.  Hydroxyzine 25 mg b.i.d. p.r.n.   10.  Lisinopril 10 mg daily.   11.  Magnesium hydroxide 30 mL at bedtime.   12.  Nystatin b.i.d. p.r.n.   13.  PreserVision 1 capsule daily.   14.  Simvastatin 10 mg at bedtime.   15.  Spiriva 18 mcg daily.   16.  Tramadol 50 mg q.6 h. p.r.n.   17.  The patient is to stop taking Apixaban.        FOLLOWUP:  The patient is to follow up with primary care physician on Monday, 8/14/2017 as previously scheduled.  Recommend hemoglobin at that time.  PMD to arrange outpatient evaluation by Hematology as GI recommends for further evaluation of anemia.  Hold Eliquis until Dr. Esparza says and then restart per primary care physician.      PENDING TEST RESULTS:  Biopsy, one from gastric mucosa, one from duodenum are pending at the time of this dictation.  Also folate acid is pending at the time of this dictation.      LABORATORY DATA:  On the day of discharge, white count 5.2, hemoglobin 8.6, platelet count 175,000.  The patient's prem hemoglobin was on admission when it was 6.8.  The patient had iron level of 81.   Iron binding capacity 449, percent saturation 18.  Vitamin B12 1076.      PROCEDURES DURING THIS HOSPITALIZATION:  Endoscopy was done 08/10/2017.  This showed no bleeding source, biopsied gastric to rule out autoimmune gastritis.  Biopsied small intestine rule out celiac.  Patient also had an echocardiogram during this hospitalization.  This showed the following, this was done on 8/10/2017, LV normal structure, function and size with an EF of 55%, RV normal structure, function and size, severe biatrial enlargement, moderate tricuspid regurg, mild 35- 45 mmHg pulmonary hypertension present.  RV systolic pressure was approximated at 42 mmHg plus the right atrial pressure.  Echo from 12/2015 showed moderate RV dilatation with normal function, moderate to severe TR, RVSP of 42 mmHg.  The RV size appears normal on the study from 8/10/2017.      CONSULTATIONS DURING THIS HOSPITALIZATION:  GI transfusions.  The patient had 2 units of PRBCs.      HOSPITAL COURSE:     1.  Please see dictated history and physical for patient's initial presentation.  Helene Gibbs is an 82-year-old female with a past medical history of atrial fibrillation, hypertension, COPD, type 2 diabetes who presented from clinic for abnormal labs with a hemoglobin of 6.4.  She denied any bleeding.  She did have a positive Hemoccult.  She received a total of 2 units of PRBCs during this hospitalization she was seen by GI.  The patient did have a history of a recent colonoscopy which showed several small polyps.  The patient was seen by GI, there was no bleeding noted on endoscopy.  Biopsies were taken of the gastric mucosa and also the duodenum to evaluate for celiac and autoimmune gastritis.  These are pending at the time of this dictation.  GI did suggest Hematology/Oncology to see the patient for further evaluation of her anemia.  The patient has a folate acid which is pending at the time of this dictation; however, iron levels and B12 were normal.    2.  Type 2 diabetes.  This was stable during this hospitalization.   3.  Edema.  The patient did have some edema.  Echocardiogram was done which showed preserved ejection fraction.  The patient was increased on her Lasix to 40 mg daily, her edema in her left leg did improve.  She has about 1-2+ edema on her right leg.  Patient will continue on 40 mg of Lasix until seen by Dr. Esparza on Monday.   4.  Hypertension.  The patient was restarted on her lisinopril which was held since she was initially admitted.  She can be followed by her primary care physician.         KORIN CHOI MD             D: 2017 11:10   T: 2017 11:38   MT: [id number]      Name:     PATRICIA BOBO   MRN:      [id number]        Account:        [id number]   :      1935           Admit Date:     [id number]                                  Discharge Date:       Document: [id number]       cc: Nathan Esparza MD

## 2017-08-11 NOTE — PLAN OF CARE
Problem: Goal Outcome Summary  Goal: Goal Outcome Summary  Outcome: No Change  A&O. VSS. Percocet given for left shoulder pain. Crackles to LLL. Dry cough. Rt leg edema. Tolerating a CLD.Up SBA to the bathroom.

## 2017-08-11 NOTE — PROGRESS NOTES
GASTROENTEROLOGY PROGRESS NOTE        SUBJECTIVE:  Decreased hemoglobin but asymptomatic    OBJECTIVE:  General Appearance:  Well nourished, Alert and oriented  /72 (BP Location: Left arm)  Pulse 70  Temp 97.4  F (36.3  C) (Oral)  Resp 18  Wt 76.3 kg (168 lb 3.2 oz)  LMP  (LMP Unknown)  SpO2 94%  BMI 27.99 kg/m2  Temp (24hrs), Av.6  F (36.4  C), Min:97.1  F (36.2  C), Max:98.2  F (36.8  C)    Patient Vitals for the past 72 hrs:   Weight   17 0610 76.3 kg (168 lb 3.2 oz)   08/10/17 0400 77.3 kg (170 lb 6.7 oz)   17 1603 77.1 kg (170 lb)       Intake/Output Summary (Last 24 hours) at 17 0932  Last data filed at 17 0600   Gross per 24 hour   Intake              240 ml   Output             1150 ml   Net             -910 ml       PHYSICAL EXAM  Abdomen: Abdomen soft, non-tender. BS normal. No masses, organomegaly        Additional Comments:  ROS, FH, SH: See initial GI consult for details.    I have reviewed the patient's new clinical lab results:    Recent Labs   Lab Test  17   0722  08/10/17   0717   1720   12/08/15   1705  12/03/15   0838   WBC  5.2  4.6  6.5   < >  5.4  9.6   HGB  8.6*  8.1*  6.8*   < >  12.9  12.8   MCV  78  78  77*   < >  86  88   PLT  175  191  234   < >  202  174   INR   --    --   1.41*   --   1.21*  1.21*    < > = values in this interval not displayed.     Recent Labs   Lab Test  08/10/17   0716  17   1720  17   1457   POTASSIUM  4.0  4.3  4.5   CHLORIDE  105  102  102   CO2  26  24  23   BUN  21  24  24   ANIONGAP  8  9  9     Recent Labs   Lab Test  08/10/17   0716  17   1720  17   1640  17   1446  17   1445  10/28/16   1413   12/08/15   1705  12/03/15   0838  10/15/15   1509   ALBUMIN  3.0*  3.5   --    --   3.9   --    < >  3.2*  3.4  3.6   BILITOTAL  0.8  0.5   --    --   0.5   --    < >  0.9  2.8*  0.7   ALT  15  22   --    --   15   --    < >  161*  427*  43   AST  17  30   --    --   13   --     < >  67*  320*  53*   PROTEIN   --    --   Negative  100*   --   Negative   < >  10*   --    --    LIPASE   --    --    --    --    --    --    --   133  131  301    < > = values in this interval not displayed.     Vitamin B12 and Iron studies _ normal     Colonoscopy 10/2016  Impression:          - Small internal anal papilla found on digital rectal                        exam.                        - One 2 mm polyp in the transverse colon. Resected and                        retrieved.                        - One 3 mm polyp in the sigmoid colon. Resected and                        retrieved.                        - Diverticulosis in the sigmoid colon.                        - The examination was otherwise normal on direct and retroflexion    EGD 8/2017          Impression:               - Normal esophagus.                             - Erythematous mucosa in the antrum. Biopsied.                             - Normal examined duodenum. Biopsied.                   Pathology pending      Active Problems:    Anemia    Assessment/ Plan    Hemoglobin improved with transfusion and diuresis   Unclear cause for anemia with Vitamin B12 and iron studies normal.EGD and colonoscopy Normal.   Will check folate but doubtful it will be abnormal.   Consult [facility]- can be outpatient          Helene Bustamante MD  [facility]  Office

## 2017-08-11 NOTE — PLAN OF CARE
Problem: Goal Outcome Summary  Goal: Goal Outcome Summary  Outcome: No Change  A/ox4. Hypertensive, diastolic 160+, other VSS. C/o of L should pain, PRN Percocet given x2, effective. Advanced to Clear Liquids, tolerated well. On q4h BS checks. Bilateral pitting edema in lower extremities. Discharge pending Hbg improvement. Continue to monitor.

## 2017-08-12 LAB — COPATH REPORT: NORMAL

## 2017-08-14 ENCOUNTER — TELEPHONE (OUTPATIENT)
Dept: FAMILY MEDICINE | Facility: CLINIC | Age: 82
End: 2017-08-14

## 2017-08-14 ENCOUNTER — OFFICE VISIT (OUTPATIENT)
Dept: FAMILY MEDICINE | Facility: CLINIC | Age: 82
End: 2017-08-14
Payer: MEDICARE

## 2017-08-14 VITALS
OXYGEN SATURATION: 100 % | TEMPERATURE: 98.2 F | HEART RATE: 70 BPM | SYSTOLIC BLOOD PRESSURE: 130 MMHG | HEIGHT: 65 IN | DIASTOLIC BLOOD PRESSURE: 70 MMHG | WEIGHT: 165 LBS | BODY MASS INDEX: 27.49 KG/M2

## 2017-08-14 DIAGNOSIS — N18.30 CKD (CHRONIC KIDNEY DISEASE) STAGE 3, GFR 30-59 ML/MIN (H): ICD-10-CM

## 2017-08-14 DIAGNOSIS — E11.21 TYPE 2 DIABETES MELLITUS WITH DIABETIC NEPHROPATHY, WITHOUT LONG-TERM CURRENT USE OF INSULIN (H): ICD-10-CM

## 2017-08-14 DIAGNOSIS — M25.512 ACUTE PAIN OF LEFT SHOULDER: ICD-10-CM

## 2017-08-14 DIAGNOSIS — I48.21 PERMANENT ATRIAL FIBRILLATION (H): ICD-10-CM

## 2017-08-14 DIAGNOSIS — D64.9 ANEMIA, UNSPECIFIED TYPE: Primary | ICD-10-CM

## 2017-08-14 DIAGNOSIS — I50.32 CHRONIC DIASTOLIC CONGESTIVE HEART FAILURE (H): ICD-10-CM

## 2017-08-14 DIAGNOSIS — I07.1 TRICUSPID VALVE INSUFFICIENCY, UNSPECIFIED ETIOLOGY: ICD-10-CM

## 2017-08-14 LAB — HGB BLD-MCNC: 9.1 G/DL (ref 11.7–15.7)

## 2017-08-14 PROCEDURE — 85018 HEMOGLOBIN: CPT | Performed by: INTERNAL MEDICINE

## 2017-08-14 PROCEDURE — 99207 C FOOT EXAM  NO CHARGE: CPT | Performed by: INTERNAL MEDICINE

## 2017-08-14 PROCEDURE — 99496 TRANSJ CARE MGMT HIGH F2F 7D: CPT | Performed by: INTERNAL MEDICINE

## 2017-08-14 PROCEDURE — 36415 COLL VENOUS BLD VENIPUNCTURE: CPT | Performed by: INTERNAL MEDICINE

## 2017-08-14 PROCEDURE — 80048 BASIC METABOLIC PNL TOTAL CA: CPT | Performed by: INTERNAL MEDICINE

## 2017-08-14 RX ORDER — HYDROCODONE BITARTRATE AND ACETAMINOPHEN 5; 325 MG/1; MG/1
.5-1 TABLET ORAL EVERY 8 HOURS PRN
Qty: 30 TABLET | Refills: 0 | Status: SHIPPED | OUTPATIENT
Start: 2017-08-14 | End: 2017-09-18

## 2017-08-14 NOTE — LETTER
My Heart Failure Action Plan   Name: Helene Gibbs    YOB: 1935   Date: 8/14/2017    [place] [address] [facility] [address] [phone]  My Diagnosis: Diastolic Heart Failure   My Ejection Fraction: Over 50%    My Exercise Goal: 30 minutes daily  .     My Weight Goal:   Wt Readings from Last 2 Encounters:   08/14/17 165 lb (74.8 kg)   08/11/17 168 lb 3.2 oz (76.3 kg)     Weigh yourself daily using the same scale. If you gain more than 2 pounds in 24 hours or 5 pounds in a week call the clinic    My Diet Goal: No added salt    Emergency Room Visits:    Our goal is to improve your quality of life and help you avoid a visit to the emergency room or hospital.  If we work together, we can achieve this goal. But, if you feel you need to call 911 or go to the emergency room, please do so.  If you go to the emergency room, please bring your list of medicines and your daily weight chart with you.       GREEN ZONE     Doing well today    Weight gained is no more than 2 pounds a day or 5 pounds a week.    No swelling in feet, ankles, legs or stomach.    No more swelling than usual.    No more trouble breathing than usual.    No change in my sleep.    No other problems. Actions:    I am doing fine.  I will take my medicine, follow my diet, see my doctor, exercise, and watch for symptoms.           YELLOW ZONE         Having a bad day or flare up    Weight gain of more than 2 pounds in one day or 5 pounds in one week.    New swelling in ankle, leg, knee or thigh.    Bloating in belly, pants feel tighter.    Swelling in hands or face.    Coughing or trouble breathing while walking or talking.    Harder to breathe last night.    Have trouble sleeping, wake up short of breath.    Much more tired than usual.    Not eating.    Pain in my chest or bad leg cramps.    Feel weak or dizzy. Actions:    I need to take action and call my doctor or nurse  today.                 RED ZONE         Need medical care now    Weight gain of 5 pounds overnight.    Chest pain or pressure that does not go away.    Feel less alert.    Wheezing or have trouble breathing when at rest.    Cannot sleep lying down.    Cannot take my water pill.    Pass out or faint. Actions:    I need to call my doctor or nurse now!    Call 911 if I have chest pain or cannot breathe.        Electronically signed by: Neisha Esparza, August 14, 2017

## 2017-08-14 NOTE — MR AVS SNAPSHOT
After Visit Summary   8/14/2017    Helene Gibbs    MRN: [id number]           Patient Information     Date Of Birth          1935        Visit Information        Provider Department      8/14/2017 1:30 PM Neisha Esparza MD [facility]        Today's Diagnoses     Anemia, unspecified type    -  1    CKD (chronic kidney disease) stage 3, GFR 30-59 ml/min        Chronic diastolic congestive heart failure (H)        Tricuspid valve insufficiency, unspecified etiology        Permanent atrial fibrillation (H)          Care Instructions    Labs today  Make appointment with hematology  Continue to monitor your sugars daily  Update us in 2 weeks with your blood sugar readings  Follow up in one month  Seek sooner medical attention if there is any worsening of symptoms or problems.            Follow-ups after your visit        Additional Services     ONC/HEME ADULT REFERRAL       Your provider has referred you to: [facility]: [place] [facility] [place] [phone]   [email or web address]    Please be aware that coverage of these services is subject to the terms and limitations of your health insurance plan.  Call member services at your health plan with any benefit or coverage questions.      Please bring the following with you to your appointment:    (1) Any X-Rays, CTs or MRIs which have been performed.  Contact the facility where they were done to arrange for  prior to your scheduled appointment.   (2) List of current medications  (3) This referral request   (4) Any documents/labs given to you for this referral                  Your next 10 appointments already scheduled     Aug 30, 2017  1:30 PM CDT   MICHAELLE Extremity with Maricruz Bowen PT   [facility] ([place]LE [place]  )    [address] [place] [address] [address]  [facility] [address]   [phone]            Oct 10, 2017  1:15 PM CDT   Teletrace with CHASE KILGORE  "  [facility] AT [place] ([facility])    [address]  [place] [address] [phone]              Who to contact     If you have questions or need follow up information about today's clinic visit or your schedule please contact [facility] directly at [phone].  Normal or non-critical lab and imaging results will be communicated to you by MyChart, letter or phone within 4 business days after the clinic has received the results. If you do not hear from us within 7 days, please contact the clinic through MyChart or phone. If you have a critical or abnormal lab result, we will notify you by phone as soon as possible.  Submit refill requests through [employer] or call your pharmacy and they will forward the refill request to us. Please allow 3 business days for your refill to be completed.          Additional Information About Your Visit        [employer] Information     [employer] lets you send messages to your doctor, view your test results, renew your prescriptions, schedule appointments and more. To sign up, go to [email or web address] . Click on \"Log in\" on the left side of the screen, which will take you to the Welcome page. Then click on \"Sign up Now\" on the right side of the page.     You will be asked to enter the access code listed below, as well as some personal information. Please follow the directions to create your username and password.     Your access code is: [email or web address]  Expires: 10/24/2017  5:07 PM     Your access code will  in 90 days. If you need help or a new code, please call your [facility] or [phone].        Care EveryWhere ID     This is your Care EveryWhere ID. This could be used by other organizations to access your [place] medical records  [id number]        Your Vitals Were     Pulse Temperature Height Last Period Pulse Oximetry BMI (Body Mass Index)    70 98.2  F (36.8  C) (Tympanic) 5' 5\" (1.651 m) " (LMP Unknown) 100% 27.46 kg/m2       Blood Pressure from Last 3 Encounters:   08/14/17 130/70   08/11/17 141/72   08/09/17 126/62    Weight from Last 3 Encounters:   08/14/17 165 lb (74.8 kg)   08/11/17 168 lb 3.2 oz (76.3 kg)   08/09/17 173 lb 11.2 oz (78.8 kg)              We Performed the Following     Basic metabolic panel     Hemoglobin     ONC/HEME ADULT REFERRAL        Primary Care Provider Office Phone # Fax #    Neisha MARIMAR Esparza [phone] [address]  [place]                [place] [address]        Goals        General    start date: 4/17/14 I will weigh myself daily and if any weight gain or shortness of breath I will call the clinic. (pt-stated)     Notes - Note created  4/17/2014  3:59 PM by Margareth Pichardo RN    As of today's date 4/17/2014 goal is met at 0 - 25%.   Goal Status:  Active        Equal Access to Services     [facility]: Hadii aad ku hadasho Caitlyn, waaxda luqadaha, qaybta kaalmada rachelyamaile, pamela mccullough . So [facility] [phone].    ATENCIÓN: Si habla español, tiene a gupta disposición servicios gratuitos de asistencia lingüística. Llame al [phone].    We comply with applicable federal civil rights laws and [place] laws. We do not discriminate on the basis of race, color, national origin, age, disability sex, sexual orientation or gender identity.            Thank you!     Thank you for choosing [facility]  for your care. Our goal is always to provide you with excellent care. Hearing back from our patients is one way we can continue to improve our services. Please take a few minutes to complete the written survey that you may receive in the mail after your visit with us. Thank you!             Your Updated Medication List - Protect others around you: Learn how to safely use, store and throw away your medicines at www.disposemymeds.org.          This list is accurate as of: 8/14/17  2:17 PM.  Always use your  most recent med list.                   Brand Name Dispense Instructions for use Diagnosis    ACCU-CHEK SMARTVIEW test strip   Generic drug:  blood glucose monitoring     100 each    1 strip by In Vitro route 2 times daily Or as directed    Type 2 diabetes mellitus with diabetic nephropathy (H)       albuterol 108 (90 BASE) MCG/ACT Inhaler    PROAIR HFA/PROVENTIL HFA/VENTOLIN HFA    1 Inhaler    Inhale 2 puffs into the lungs every 4 hours as needed for shortness of breath / dyspnea or wheezing    COPD (chronic obstructive pulmonary disease) (H)       betamethasone valerate 0.1 % cream    VALISONE    15 g    Apply topically 2 times daily Use sparingly    Rash       blood glucose monitoring lancets     1 Box    USE TO TEST BLOOD SUGAR TWO TIMES A DAY OR AS DIRECTED    Type 2 diabetes mellitus with diabetic nephropathy (H)       COMPOUNDED NON-CONTROLLED SUBSTANCE - PHARMACY TO MIX COMPOUNDED MEDICATION    CMPD RX    42 suppository    Vaginal valium- 10 mg suppository    Vaginal pain, Pelvic floor tension, Urinary retention       fluocinolone 0.01 % solution    SYNALAR     Apply topically daily as needed (itchy scalp)        fluticasone 50 MCG/ACT spray    FLONASE    16 mL    INHALE 1 TO 2 SPRAYS INTO BOTH NOSTRILS DAILY    Rhinitis, unspecified type       furosemide 20 MG tablet    LASIX    180 tablet    TAKE 1-2 TABS BY MOUTH ONCE DAILY. MAY REPEAT AFTER NOON IF NEEDED FOR WEIGHT GAIN/2+ EDEMA    Swelling       GLIPIZIDE PO      Take 5 mg by mouth every morning (before breakfast)        HYDROcodone-acetaminophen 5-325 MG per tablet    NORCO    20 tablet    Take 0.5-1 tablets by mouth every 6 hours as needed for moderate to severe pain maximum 2 tablet(s) per day    Acute pain of left shoulder       hydrocortisone 2.5 % cream    ANUSOL-HC    28 g    Place rectally 2 times daily as needed for hemorrhoids    External hemorrhoids       hydrOXYzine 25 MG capsule    VISTARIL    180 capsule    Take 1 capsule (25 mg) by  mouth 2 times daily as needed for itching    Itching       lisinopril 5 MG tablet    PRINIVIL/ZESTRIL     Take 2 tablets (10 mg) by mouth daily    Essential hypertension       magnesium hydroxide 400 MG/5ML suspension    MILK OF MAGNESIA     Take 30 mLs by mouth At Bedtime        nystatin 466508 UNIT/GM Powd    MYCOSTATIN    60 g    Apply topically 2 times daily as needed    Rash       order for DME     1 each    Equipment being ordered: AccuChek Smart View strips Patient tests twice daily.    Type II or unspecified type diabetes mellitus without mention of complication, not stated as uncontrolled       PRESERVISION/LUTEIN Caps      Take 1 capsule by mouth daily        simvastatin 10 MG tablet    ZOCOR    90 tablet    Take 1 tablet (10 mg) by mouth At Bedtime    Type 2 diabetes mellitus with diabetic nephropathy, without long-term current use of insulin (H)       tiotropium 18 MCG capsule    SPIRIVA HANDIHALER    30 capsule    Inhale 1 capsule (18 mcg) into the lungs daily    Chronic obstructive pulmonary disease, unspecified COPD type (H)       traMADol 50 MG tablet    ULTRAM    100 tablet    Take 1 tablet (50 mg) by mouth every 6 hours as needed for moderate pain    Intractable back pain

## 2017-08-14 NOTE — NURSING NOTE
"Chief Complaint   Patient presents with     Edema        Initial /73 (BP Location: Right arm, Patient Position: Chair, Cuff Size: Adult Regular)  Pulse 70  Temp 98.2  F (36.8  C) (Tympanic)  Ht 5' 5\" (1.651 m)  Wt 165 lb (74.8 kg)  LMP  (LMP Unknown)  SpO2 100%  BMI 27.46 kg/m2 Estimated body mass index is 27.46 kg/(m^2) as calculated from the following:    Height as of this encounter: 5' 5\" (1.651 m).    Weight as of this encounter: 165 lb (74.8 kg)..    BP completed using cuff size: regular  MEDICATIONS REVIEWED  SOCIAL AND FAMILY HX REVIEWED  Carmen Michelle CMA  "

## 2017-08-14 NOTE — PATIENT INSTRUCTIONS
Labs today  Make appointment with hematology  Continue to monitor your sugars daily  Update us in 2 weeks with your blood sugar readings  Follow up in one month  Seek sooner medical attention if there is any worsening of symptoms or problems.

## 2017-08-14 NOTE — LETTER
[facility]  [address] [facility]  [address]  [place]  [address]  Tel: [phone]    August 16, 2017    Helene Gibbs  [address]   [facility] [address]        Dear Ms. Gibbs,    Hemoglobin is 9.1  Creatinine which is kidney function is slightly elevated and GFR is low  Chronic kidney disease is stable  Make lab appointment in one week    If you have any further questions or problems, please contact our office.      Sincerely,    Neisha Esparza MD/thu    Results for orders placed or performed in visit on 08/14/17   Hemoglobin   Result Value Ref Range    Hemoglobin 9.1 (L) 11.7 - 15.7 g/dL   Basic metabolic panel   Result Value Ref Range    Sodium 133 133 - 144 mmol/L    Potassium 3.9 3.4 - 5.3 mmol/L    Chloride 96 94 - 109 mmol/L    Carbon Dioxide 31 20 - 32 mmol/L    Anion Gap 6 3 - 14 mmol/L    Glucose 166 (H) 70 - 99 mg/dL    Urea Nitrogen 20 7 - 30 mg/dL    Creatinine 1.13 (H) 0.52 - 1.04 mg/dL    GFR Estimate 46 (L) >60 mL/min/1.7m2    GFR Estimate If Black 56 (L) >60 mL/min/1.7m2    Calcium 10.0 8.5 - 10.1 mg/dL     *Note: Due to a large number of results and/or encounters for the requested time period, some results have not been displayed. A complete set of results can be found in Results Review.           Enclosure: Lab Results

## 2017-08-15 LAB
ANION GAP SERPL CALCULATED.3IONS-SCNC: 6 MMOL/L (ref 3–14)
BUN SERPL-MCNC: 20 MG/DL (ref 7–30)
CALCIUM SERPL-MCNC: 10 MG/DL (ref 8.5–10.1)
CHLORIDE SERPL-SCNC: 96 MMOL/L (ref 94–109)
CO2 SERPL-SCNC: 31 MMOL/L (ref 20–32)
CREAT SERPL-MCNC: 1.13 MG/DL (ref 0.52–1.04)
GFR SERPL CREATININE-BSD FRML MDRD: 46 ML/MIN/1.7M2
GLUCOSE SERPL-MCNC: 166 MG/DL (ref 70–99)
POTASSIUM SERPL-SCNC: 3.9 MMOL/L (ref 3.4–5.3)
SODIUM SERPL-SCNC: 133 MMOL/L (ref 133–144)

## 2017-08-15 NOTE — PROGRESS NOTES
"  SUBJECTIVE:                                                    Helene Gibbs is a 82 year old female who presents to clinic today for the following health issues:          Hospital Follow-up Visit:    Hospital/Nursing Home/IP Rehab Facility: [facility]  Date of Admission: 8/9/17  Date of Discharge: 8/11/17  Reason(s) for Admission: low hemoglocin            Problems taking medications regularly:  None       Medication changes since discharge: yes       Problems adhering to non-medication therapy:  None    Summary of hospitalization:  [facility] discharge summary reviewed  Diagnostic Tests/Treatments reviewed.  Follow up needed: yes by hematologist  Other Healthcare Providers Involved in Patient s Care:         Care Coordination  Update since discharge: improved.     Post Discharge Medication Reconciliation: discharge medications reconciled and changed, per note/orders (see AVS).  Plan of care communicated with patient     Coding guidelines for this visit:  Type of Medical   Decision Making Face-to-Face Visit       within 7 Days of discharge Face-to-Face Visit        within 14 days of discharge   Moderate Complexity 44833 64058   High Complexity 39318 89217              {additional problems for provider to add:054472}    Problem list and histories reviewed & adjusted, as indicated.  Additional history: {NONE - AS DOCUMENTED:316594::\"as documented\"}    {HIST REVIEW/ LINKS 2:754497}    Reviewed and updated as needed this visit by clinical staff  Tobacco  Allergies  Meds  Soc Hx      Reviewed and updated as needed this visit by Provider         {PROVIDER CHARTING PREFERENCE:563143}  "

## 2017-08-16 NOTE — PROGRESS NOTES
Please Notify the patient  Hemoglobin is 9.1  Creatinine which is kidney function is slightly elevated and GFR is low  Chronic kidney disease is stable  Make lab appointment in one week  Please send the copy of lab results also to this patient.

## 2017-08-21 DIAGNOSIS — I48.91 ATRIAL FIBRILLATION (H): ICD-10-CM

## 2017-08-21 DIAGNOSIS — D64.9 ANEMIA, UNSPECIFIED TYPE: ICD-10-CM

## 2017-08-21 DIAGNOSIS — I50.32 CHRONIC DIASTOLIC CONGESTIVE HEART FAILURE (H): ICD-10-CM

## 2017-08-21 LAB — HGB BLD-MCNC: 9.1 G/DL (ref 11.7–15.7)

## 2017-08-21 PROCEDURE — 80048 BASIC METABOLIC PNL TOTAL CA: CPT | Performed by: INTERNAL MEDICINE

## 2017-08-21 PROCEDURE — 85018 HEMOGLOBIN: CPT | Performed by: INTERNAL MEDICINE

## 2017-08-21 PROCEDURE — 36415 COLL VENOUS BLD VENIPUNCTURE: CPT | Performed by: INTERNAL MEDICINE

## 2017-08-22 ENCOUNTER — HOSPITAL ENCOUNTER (OUTPATIENT)
Facility: CLINIC | Age: 82
Setting detail: SPECIMEN
Discharge: HOME | End: 2017-08-22
Attending: INTERNAL MEDICINE | Admitting: INTERNAL MEDICINE
Payer: MEDICARE

## 2017-08-22 ENCOUNTER — ONCOLOGY VISIT (OUTPATIENT)
Dept: ONCOLOGY | Facility: CLINIC | Age: 82
End: 2017-08-22
Attending: INTERNAL MEDICINE
Payer: MEDICARE

## 2017-08-22 VITALS
TEMPERATURE: 97.2 F | WEIGHT: 166 LBS | SYSTOLIC BLOOD PRESSURE: 158 MMHG | DIASTOLIC BLOOD PRESSURE: 74 MMHG | HEART RATE: 80 BPM | OXYGEN SATURATION: 100 % | RESPIRATION RATE: 18 BRPM | BODY MASS INDEX: 27.62 KG/M2

## 2017-08-22 DIAGNOSIS — D64.9 ANEMIA, UNSPECIFIED TYPE: Primary | ICD-10-CM

## 2017-08-22 LAB
ANION GAP SERPL CALCULATED.3IONS-SCNC: 5 MMOL/L (ref 3–14)
BUN SERPL-MCNC: 26 MG/DL (ref 7–30)
CALCIUM SERPL-MCNC: 9.9 MG/DL (ref 8.5–10.1)
CHLORIDE SERPL-SCNC: 95 MMOL/L (ref 94–109)
CO2 SERPL-SCNC: 31 MMOL/L (ref 20–32)
CREAT SERPL-MCNC: 1.28 MG/DL (ref 0.52–1.04)
GFR SERPL CREATININE-BSD FRML MDRD: 40 ML/MIN/1.7M2
GLUCOSE SERPL-MCNC: 114 MG/DL (ref 70–99)
LDH SERPL L TO P-CCNC: 188 U/L (ref 81–234)
POTASSIUM SERPL-SCNC: 4.7 MMOL/L (ref 3.4–5.3)
SODIUM SERPL-SCNC: 131 MMOL/L (ref 133–144)

## 2017-08-22 PROCEDURE — 99211 OFF/OP EST MAY X REQ PHY/QHP: CPT

## 2017-08-22 PROCEDURE — 84165 PROTEIN E-PHORESIS SERUM: CPT | Performed by: INTERNAL MEDICINE

## 2017-08-22 PROCEDURE — 82668 ASSAY OF ERYTHROPOIETIN: CPT | Performed by: INTERNAL MEDICINE

## 2017-08-22 PROCEDURE — 99204 OFFICE O/P NEW MOD 45 MIN: CPT | Performed by: INTERNAL MEDICINE

## 2017-08-22 PROCEDURE — 86334 IMMUNOFIX E-PHORESIS SERUM: CPT | Performed by: INTERNAL MEDICINE

## 2017-08-22 PROCEDURE — 83010 ASSAY OF HAPTOGLOBIN QUANT: CPT | Performed by: INTERNAL MEDICINE

## 2017-08-22 PROCEDURE — 82784 ASSAY IGA/IGD/IGG/IGM EACH: CPT | Performed by: INTERNAL MEDICINE

## 2017-08-22 PROCEDURE — 00000402 ZZHCL STATISTIC TOTAL PROTEIN: Performed by: INTERNAL MEDICINE

## 2017-08-22 PROCEDURE — 83615 LACTATE (LD) (LDH) ENZYME: CPT | Performed by: INTERNAL MEDICINE

## 2017-08-22 PROCEDURE — 36415 COLL VENOUS BLD VENIPUNCTURE: CPT

## 2017-08-22 ASSESSMENT — PAIN SCALES - GENERAL: PAINLEVEL: SEVERE PAIN (7)

## 2017-08-22 NOTE — PATIENT INSTRUCTIONS
-Labs.  -Bone marrow biopsy in next 1-2 weeks under local anaesthesia. Unilateral. Send for cytogenetics.  -Follow up after bone marrow biopsy.  Scheduled/irina    You are scheduled for a Bone Marrow Biopsy on August 29,2017 at [facility] with Dr Cohen  Please check in at the [employer] Welcome Desk at 9:30am for a procedure at 10:30am  Nothing to eat or drink after midnight  You will need a .      AVS printed & given to patient/Irina

## 2017-08-22 NOTE — MR AVS SNAPSHOT
After Visit Summary   8/22/2017    Helene Gibbs    MRN: [id number]           Patient Information     Date Of Birth          1935        Visit Information        Provider Department      8/22/2017 3:00 PM Estiven Cali MD [facility]        Today's Diagnoses     Anemia, unspecified type    -  1      Care Instructions    -Labs.  -Bone marrow biopsy in next 1-2 weeks under local anaesthesia. Unilateral. Send for cytogenetics.  -Follow up after bone marrow biopsy.      You are scheduled for a Bone Marrow Biopsy on August 29,2017 at [facility] with Dr Cohen  [facility] in at the [facility] Welcome Desk at 9:30am for a procedure at 10:30am  Nothing to eat or drink after midnight  You will need a .          Follow-ups after your visit        Your next 10 appointments already scheduled     Aug 29, 2017   Procedure with Marino Cohen MD   [facility] [place] [facility] ([facility])    [address]  [place] [address]   [phone]           [facility] is located at [address] [place]            Aug 30, 2017  1:30 PM CDT   MICHAELLE Extremity with Maricruz Bowen, PT   [facility] [place] [facility] ([place]LE [place]  )    [address]  [place] [address] [phone]            Sep 12, 2017  1:30 PM CDT   Return Visit with Estiven Cali MD   [facility] ([facility])    [facility]  [address]  [place] [address] [phone]            Oct 10, 2017  1:15 PM CDT   Teletrace with STONE TECH1   [facility] [place] ([facility])    [address]  [place] [address] [phone]              Who to contact     If you have questions or need follow up information about today's clinic visit or your schedule please contact [facility] directly  "at [phone].  Normal or non-critical lab and imaging results will be communicated to you by MyChart, letter or phone within 4 business days after the clinic has received the results. If you do not hear from us within 7 days, please contact the clinic through [employer] or phone. If you have a critical or abnormal lab result, we will notify you by phone as soon as possible.  Submit refill requests through [employer] or call your pharmacy and they will forward the refill request to us. Please allow 3 business days for your refill to be completed.          Additional Information About Your Visit        [employer] Information     [employer] lets you send messages to your doctor, view your test results, renew your prescriptions, schedule appointments and more. To sign up, go to [email or web address] . Click on \"Log in\" on the left side of the screen, which will take you to the Welcome page. Then click on \"Sign up Now\" on the right side of the page.     You will be asked to enter the access code listed below, as well as some personal information. Please follow the directions to create your username and password.     Your access code is: [email or web address]  Expires: 10/24/2017  5:07 PM     Your access code will  in 90 days. If you need help or a new code, please call your [place] clinic or [phone].        Care EveryWhere ID     This is your Care EveryWhere ID. This could be used by other organizations to access your [place] medical records  [id number]        Your Vitals Were     Pulse Temperature Respirations Last Period Pulse Oximetry BMI (Body Mass Index)    80 97.2  F (36.2  C) (Oral) 18 (LMP Unknown) 100% 27.62 kg/m2       Blood Pressure from Last 3 Encounters:   17 158/74   17 130/70   17 141/72    Weight from Last 3 Encounters:   17 75.3 kg (166 lb)   17 74.8 kg (165 lb)   17 76.3 kg (168 lb 3.2 oz)              We Performed the Following     Bone marrow biopsy     " Erythropoietin     Haptoglobin     Lactate Dehydrogenase     Protein electrophoresis     Protein Immunofixation Serum        Primary Care Provider Office Phone # Fax #    Neisha MINAYA MD [place] [phone] [address]  [place]                [place] [address]        Goals        General    start date: 4/17/14 I will weigh myself daily and if any weight gain or shortness of breath I will call the clinic. (pt-stated)     Notes - Note created  4/17/2014  3:59 PM by Margareth Pichardo, RN    As of today's date 4/17/2014 goal is met at 0 - 25%.   Goal Status:  Active        Equal Access to Services     [facility]: Hadii aad ruslan Brady, waaxda luvenessaadaha, qaybta kaalmada maddy, pamela mccullough . So [facility] [phone].    ATENCIÓN: Si habla español, tiene a gupta disposición servicios gratuitos de asistencia lingüística. Llame al [phone].    We comply with applicable federal civil rights laws and [place] laws. We do not discriminate on the basis of race, color, national origin, age, disability sex, sexual orientation or gender identity.            Thank you!     Thank you for choosing [facility]  for your care. Our goal is always to provide you with excellent care. Hearing back from our patients is one way we can continue to improve our services. Please take a few minutes to complete the written survey that you may receive in the mail after your visit with us. Thank you!             Your Updated Medication List - Protect others around you: Learn how to safely use, store and throw away your medicines at www.disposemymeds.org.          This list is accurate as of: 8/22/17  4:14 PM.  Always use your most recent med list.                   Brand Name Dispense Instructions for use Diagnosis    ACCU-CHEK SMARTVIEW test strip   Generic drug:  blood glucose monitoring     100 each    1 strip by In Vitro route 2 times daily Or as directed    Type 2  diabetes mellitus with diabetic nephropathy (H)       albuterol 108 (90 BASE) MCG/ACT Inhaler    PROAIR HFA/PROVENTIL HFA/VENTOLIN HFA    1 Inhaler    Inhale 2 puffs into the lungs every 4 hours as needed for shortness of breath / dyspnea or wheezing    COPD (chronic obstructive pulmonary disease) (H)       betamethasone valerate 0.1 % cream    VALISONE    15 g    Apply topically 2 times daily Use sparingly    Rash       blood glucose monitoring lancets     1 Box    USE TO TEST BLOOD SUGAR TWO TIMES A DAY OR AS DIRECTED    Type 2 diabetes mellitus with diabetic nephropathy (H)       COMPOUNDED NON-CONTROLLED SUBSTANCE - PHARMACY TO MIX COMPOUNDED MEDICATION    CMPD RX    42 suppository    Vaginal valium- 10 mg suppository    Vaginal pain, Pelvic floor tension, Urinary retention       fluocinolone 0.01 % solution    SYNALAR     Apply topically daily as needed (itchy scalp)        fluticasone 50 MCG/ACT spray    FLONASE    16 mL    INHALE 1 TO 2 SPRAYS INTO BOTH NOSTRILS DAILY    Rhinitis, unspecified type       furosemide 20 MG tablet    LASIX    180 tablet    TAKE 1-2 TABS BY MOUTH ONCE DAILY. MAY REPEAT AFTER NOON IF NEEDED FOR WEIGHT GAIN/2+ EDEMA    Swelling       GLIPIZIDE PO      Take 5 mg by mouth every morning (before breakfast)        HYDROcodone-acetaminophen 5-325 MG per tablet    NORCO    30 tablet    Take 0.5-1 tablets by mouth every 8 hours as needed for moderate to severe pain maximum 2 tablet(s) per day    Acute pain of left shoulder       hydrocortisone 2.5 % cream    ANUSOL-HC    28 g    Place rectally 2 times daily as needed for hemorrhoids    External hemorrhoids       hydrOXYzine 25 MG capsule    VISTARIL    180 capsule    Take 1 capsule (25 mg) by mouth 2 times daily as needed for itching    Itching       lisinopril 5 MG tablet    PRINIVIL/ZESTRIL     Take 2 tablets (10 mg) by mouth daily    Essential hypertension       magnesium hydroxide 400 MG/5ML suspension    MILK OF MAGNESIA     Take 30  mLs by mouth At Bedtime        nystatin 009571 UNIT/GM Powd    MYCOSTATIN    60 g    Apply topically 2 times daily as needed    Rash       order for DME     1 each    Equipment being ordered: AccuChek Smart View strips Patient tests twice daily.    Type II or unspecified type diabetes mellitus without mention of complication, not stated as uncontrolled       PRESERVISION/LUTEIN Caps      Take 1 capsule by mouth daily        simvastatin 10 MG tablet    ZOCOR    90 tablet    Take 1 tablet (10 mg) by mouth At Bedtime    Type 2 diabetes mellitus with diabetic nephropathy, without long-term current use of insulin (H)       tiotropium 18 MCG capsule    SPIRIVA HANDIHALER    30 capsule    Inhale 1 capsule (18 mcg) into the lungs daily    Chronic obstructive pulmonary disease, unspecified COPD type (H)       traMADol 50 MG tablet    ULTRAM    100 tablet    Take 1 tablet (50 mg) by mouth every 6 hours as needed for moderate pain    Intractable back pain

## 2017-08-22 NOTE — PROGRESS NOTES
"Oncology Rooming Note    August 22, 2017 2:59 PM   Helene Gibbs is a 82 year old female who presents for:    Chief Complaint   Patient presents with     Oncology Clinic Visit     NEW PT     Initial Vitals: /74 (BP Location: Right arm, Patient Position: Sitting, Cuff Size: Adult Regular)  Pulse 80  Temp 97.2  F (36.2  C) (Oral)  Resp 18  Wt 75.3 kg (166 lb)  LMP  (LMP Unknown)  SpO2 100%  BMI 27.62 kg/m2 Estimated body mass index is 27.62 kg/(m^2) as calculated from the following:    Height as of 8/14/17: 1.651 m (5' 5\").    Weight as of this encounter: 75.3 kg (166 lb). Body surface area is 1.86 meters squared.  Severe Pain (7) Comment: neuropathy, athritis consistant   No LMP recorded (lmp unknown). Patient is postmenopausal.  Allergies reviewed: Yes  Medications reviewed: Yes    Medications: Medication refills not needed today.  Pharmacy name entered into [facility]:    [facility], [place] - [address], [address]  [id number], [place] - [address] [facility] [id number] [facility], [place] - [address] AT [address] [place] [address]    Clinical concerns: none Viraj was notified.    5 minutes for nursing intake (face to face time)     Keila Jacobsen MA          Medical Assistant Note:  Helene Gibbs presents today for labs.    Patient seen by provider today: Yes   present during visit today: Not Applicable.    Concerns: No Concerns.    Procedure:  Lab draw site: RAC, Needle type: BF, Gauge: 21.    Post Assessment:  Labs drawn without difficulty: Yes.    Discharge Plan:  Departure Mode: Ambulatory.    Face to Face Time: 5 minutes.    Aura Bright MA          DISCHARGE PLAN:    Labs drawn  Bone marrow Biopsy sheet given to and reviewed with her- Local anesthesia   MD follow up after BM BX      Next appointments: See patient instruction section  Departure Mode: Ambulatory  Accompanied by: self  8 minutes for nursing discharge " (face to face time)   Holly Tristan RN

## 2017-08-23 ENCOUNTER — TELEPHONE (OUTPATIENT)
Dept: CARDIOLOGY | Facility: CLINIC | Age: 82
End: 2017-08-23

## 2017-08-23 ENCOUNTER — TELEPHONE (OUTPATIENT)
Dept: FAMILY MEDICINE | Facility: CLINIC | Age: 82
End: 2017-08-23

## 2017-08-23 DIAGNOSIS — E87.1 HYPONATREMIA: ICD-10-CM

## 2017-08-23 DIAGNOSIS — D64.9 ANEMIA, UNSPECIFIED TYPE: Primary | ICD-10-CM

## 2017-08-23 LAB
ALBUMIN SERPL ELPH-MCNC: 4 G/DL (ref 3.7–5.1)
ALPHA1 GLOB SERPL ELPH-MCNC: 0.3 G/DL (ref 0.2–0.4)
ALPHA2 GLOB SERPL ELPH-MCNC: 0.8 G/DL (ref 0.5–0.9)
B-GLOBULIN SERPL ELPH-MCNC: 0.9 G/DL (ref 0.6–1)
EPO SERPL-ACNC: 138 MU/ML (ref 4–27)
GAMMA GLOB SERPL ELPH-MCNC: 1.2 G/DL (ref 0.7–1.6)
HAPTOGLOB SERPL-MCNC: 139 MG/DL (ref 35–175)
IGA SERPL-MCNC: 306 MG/DL (ref 70–380)
IGG SERPL-MCNC: 1000 MG/DL (ref 695–1620)
IGM SERPL-MCNC: 197 MG/DL (ref 60–265)
M PROTEIN SERPL ELPH-MCNC: 0.1 G/DL
PROT PATTERN SERPL ELPH-IMP: ABNORMAL
PROT PATTERN SERPL IFE-IMP: NORMAL

## 2017-08-23 NOTE — TELEPHONE ENCOUNTER
Call from pt; she has been taken off Eliquis since 8-9-17 for ongoing, unanswered anemia.( Permanent AF)  She will be having a bone marrow biopsy next week. I will forward to Dr Juarez for an FYI. Pt will call us with any further updates. SueLangenbrunnerRN

## 2017-08-23 NOTE — PROGRESS NOTES
Spoke to patient and discussed the result  She was seen by  and she is going to have bone marrow biopsy on this Tuesday  So will keep her eliquis on hold as hemoglobin is still 9.1.  Will recheck after bone marrow biopsy and discuss again after reviewing result   Discussed the risk and benefit of anticoagulant.  Sent staff message to  to get his input.

## 2017-08-23 NOTE — PROGRESS NOTES
This consult has been requested by Dr. Esparza for anemia.      Ms. Helene Gibbs is an 82-year-old female who was recently admitted to the hospital for severe anemia. She has been doing well till July,2017. In august 2017, she started to have leg swelling. She was also more fatigued and also having some shortness of breath. Patient was seen in the clinic on 08/09/2017.    Multiple labs were done on 08/09/2017.  Hemoglobin was low at 6.8.  BNP was elevated at 3498.  She was admitted to the hospital and given blood transfusion.  Her hemoglobin improved to 8.6 on 08/11/2017.  Symptomatically, she is feeling better.  Because of this anemia, hematology consult has been requested. Patient's labs and investigations for last few months are reviewed and summarized below:   1.  On 09/26/2016, hemoglobin of 15.7.   2.  On 04/25/2017, hemoglobin of 11.8 with MCV of 93.   3.  On 08/09/2017:  -WBC of 7.4, hemoglobin of 6.6 and platelet of 299.  MCV of 79.  RDW of 17.1.   -Creatinine of 1.46.    -Calcium of 10.0.    -LFT is normal.    -Stool for occult blood positive.   4.  On 08/10/2017:  -Iron of 81 and saturation of 18%.    -Vitamin B12 1076.   -Folate of 17.8.   5.  EGD was done on 08/10/2017.  There was erythematous mucosa in the antrum.  Normal esophagus and duodenum.  Biopsy reveals focal chronic inflammation.  No H. pylori.  No malignancy.   6.  Colonoscopy on 10/07/2016 had revealed tiny polyps and diverticulosis.  Otherwise normal.  Pathology revealed tubular adenoma without any high-grade dysplasia.      Patient denies any bleeding from ear, nose or throat.  No blood in the urine or stool.  No black stool. Patient was on Eliquis for atrial fibrillation.  That is on hold now.      REVIEW OF SYSTEMS:    Patient overall is feeling better.  No headache.  No dizziness.  No chest pain.  No shortness of breath now.  She was short of breath when she was severely anemic.  No abdominal pain, nausea or vomiting.  No bleeding from  any site.  No blood in urine or stool.  No fever, chills or night sweats.  No recent weight loss.  Her fatigue is slowly improving.  All other review of systems negative.      ALLERGIES:  Reviewed.      MEDICATIONS:  Reviewed.      PAST MEDICAL AND SURGICAL HISTORY:   1.  Atrial fibrillation.   2.  Hypertension.   3.  Pulmonary hypertension.   4.  Status post pacemaker placement.   5.  Diabetes mellitus.   6.  Coronary artery disease.   7.  CHF.   8.  COPD.   9.  Osteoarthritis.   10.  Chronic kidney disease.   11.  Cholecystectomy.   12.  Bilateral knee replacement.   13.  Shoulder surgery.      SOCIAL HISTORY:    -Patient is a chronic smoker for the last 60 years.  On and off she has quit smoking.  At this time, she continues to smoke.    -Occasional alcohol use.      FAMILY HISTORY:    -She had 2 sisters.  One  at age of 101 and other  at the age of 98.    -She did not have any brothers.      PHYSICAL EXAMINATION:   GENERAL:  She is alert, oriented x3.   VITAL SIGNS:  Reviewed.   Rest of the systems not examined.      LABORATORY DATA:  Reviewed.      ASSESSMENT:   1.  An 82-year-old female with normochromic, normocytic anemia.   2.  Chronic kidney disease.   3.  Multiple medical problems including hypertension, diabetes mellitus and atrial fibrillation.      RECOMMENDATIONS:   1.  I had a long discussion with the patient regarding anemia. On 2017, hemoglobin was normal at 11.8. It decreased to 6.6 on 2017. Patient did not have any bleeding.  Her anemia is normochromic and normocytic.  This is typically seen with blood loss, anemia due to chronic disease, anemia due to chronic kidney disease or developing myelodysplastic syndrome.      Discussed regarding possible causes of anemia in her case. Patient was on Eliquis.  There is a high likely that she had some GI bleed.  In the last 1 year she had both EGD and colonoscopy.  They have been negative for bleeding.  We discussed regarding other  causes.  She does have chronic kidney disease, which will cause anemia.  She does have multiple medical problems which is contributing to anemia.  Also discussed regarding myelodysplastic syndrome.  She is 82 years old.  That is a possibility.      Discussed regarding complications of anemia. Patient was mainly fatigued and short of breath.  She did have congestive heart failure from anemia.      2.  Discussed regarding further workup.  She had multiple labs done in the hospital.  No deficiency of iron, vitamin B12 or folate.  We will get some labs including LDH, haptoglobin and SPEP.      Discussed regarding GI workup.  She already had EGD and colonoscopy done in the last 1 year.  We do not need to repeat them.  As there is no evidence of iron deficiency, no capsule endoscopy is needed.      Discussed regarding bone marrow biopsy to rule out MDS.  Procedure was explained.  She is agreeable for it.  Bone marrow biopsy under local anesthesia will be done.  Sample will be sent for cytogenetics.      3. Patient had multiple questions, which were all answered.  I will see her back in the clinic after the bone marrow biopsy.      Thanks for the consult.      Total time spent 45 minutes, most of time was spent in counseling.         ALEE TIAN MD             D: 2017 15:59   T: 2017 03:37   MT: [email or web address]      Name:     PATRICIA BOBO   MRN:      [id number]        Account:      [id number]   :      1935           Visit Date:   2017      Document: [id number]

## 2017-08-28 ENCOUNTER — TRANSFERRED RECORDS (OUTPATIENT)
Dept: HEALTH INFORMATION MANAGEMENT | Facility: CLINIC | Age: 82
End: 2017-08-28

## 2017-08-28 LAB
CREAT SERPL-MCNC: 1.35 MG/DL (ref 0.6–0.88)
GFR SERPL CREATININE-BSD FRML MDRD: 37 ML/MIN/1.73M2
GLUCOSE SERPL-MCNC: 84 MG/DL (ref 65–99)
POTASSIUM SERPL-SCNC: 5.1 MMOL/L (ref 3.5–5.3)

## 2017-08-29 ENCOUNTER — HOSPITAL ENCOUNTER (OUTPATIENT)
Facility: CLINIC | Age: 82
Discharge: HOME | End: 2017-08-29
Attending: PATHOLOGY | Admitting: PATHOLOGY
Payer: MEDICARE

## 2017-08-29 ENCOUNTER — TELEPHONE (OUTPATIENT)
Dept: ONCOLOGY | Facility: CLINIC | Age: 82
End: 2017-08-29

## 2017-08-29 ENCOUNTER — HOSPITAL ENCOUNTER (OUTPATIENT)
Facility: CLINIC | Age: 82
Setting detail: SPECIMEN
Discharge: HOME | End: 2017-08-29
Attending: PATHOLOGY | Admitting: INTERNAL MEDICINE
Payer: MEDICARE

## 2017-08-29 ENCOUNTER — DOCUMENTATION ONLY (OUTPATIENT)
Dept: OTHER | Facility: CLINIC | Age: 82
End: 2017-08-29

## 2017-08-29 VITALS
WEIGHT: 166 LBS | HEART RATE: 74 BPM | HEIGHT: 65 IN | OXYGEN SATURATION: 93 % | DIASTOLIC BLOOD PRESSURE: 67 MMHG | SYSTOLIC BLOOD PRESSURE: 134 MMHG | BODY MASS INDEX: 27.66 KG/M2

## 2017-08-29 PROBLEM — Z71.89 ACP (ADVANCE CARE PLANNING): Chronic | Status: ACTIVE | Noted: 2017-08-29

## 2017-08-29 LAB
BASOPHILS # BLD AUTO: 0 10E9/L (ref 0–0.2)
BASOPHILS NFR BLD AUTO: 0.7 %
DIFFERENTIAL METHOD BLD: ABNORMAL
EOSINOPHIL # BLD AUTO: 0.1 10E9/L (ref 0–0.7)
EOSINOPHIL NFR BLD AUTO: 1.5 %
ERYTHROCYTE [DISTWIDTH] IN BLOOD BY AUTOMATED COUNT: 19 % (ref 10–15)
GLUCOSE SERPL-MCNC: 144 MG/DL (ref 70–99)
HCT VFR BLD AUTO: 28.8 % (ref 35–47)
HGB BLD-MCNC: 8.8 G/DL (ref 11.7–15.7)
IMM GRANULOCYTES # BLD: 0 10E9/L (ref 0–0.4)
IMM GRANULOCYTES NFR BLD: 0.2 %
LYMPHOCYTES # BLD AUTO: 0.9 10E9/L (ref 0.8–5.3)
LYMPHOCYTES NFR BLD AUTO: 13.9 %
MCH RBC QN AUTO: 23.5 PG (ref 26.5–33)
MCHC RBC AUTO-ENTMCNC: 30.6 G/DL (ref 31.5–36.5)
MCV RBC AUTO: 77 FL (ref 78–100)
MONOCYTES # BLD AUTO: 0.8 10E9/L (ref 0–1.3)
MONOCYTES NFR BLD AUTO: 13.7 %
NEUTROPHILS # BLD AUTO: 4.3 10E9/L (ref 1.6–8.3)
NEUTROPHILS NFR BLD AUTO: 70 %
NRBC # BLD AUTO: 0 10*3/UL
NRBC BLD AUTO-RTO: 0 /100
PLATELET # BLD AUTO: 324 10E9/L (ref 150–450)
RBC # BLD AUTO: 3.75 10E12/L (ref 3.8–5.2)
RETICS # AUTO: 22.1 10E9/L (ref 25–95)
RETICS/RBC NFR AUTO: 0.6 % (ref 0.5–2)
WBC # BLD AUTO: 6.1 10E9/L (ref 4–11)

## 2017-08-29 PROCEDURE — 88182 CELL MARKER STUDY: CPT | Performed by: INTERNAL MEDICINE

## 2017-08-29 PROCEDURE — 85097 BONE MARROW INTERPRETATION: CPT | Performed by: INTERNAL MEDICINE

## 2017-08-29 PROCEDURE — 88305 TISSUE EXAM BY PATHOLOGIST: CPT | Performed by: INTERNAL MEDICINE

## 2017-08-29 PROCEDURE — 85060 BLOOD SMEAR INTERPRETATION: CPT | Performed by: INTERNAL MEDICINE

## 2017-08-29 PROCEDURE — 88311 DECALCIFY TISSUE: CPT | Mod: 26 | Performed by: INTERNAL MEDICINE

## 2017-08-29 PROCEDURE — 88313 SPECIAL STAINS GROUP 2: CPT | Performed by: INTERNAL MEDICINE

## 2017-08-29 PROCEDURE — 88305 TISSUE EXAM BY PATHOLOGIST: CPT | Mod: 26 | Performed by: INTERNAL MEDICINE

## 2017-08-29 PROCEDURE — 00000159 ZZHCL STATISTIC H-SEND OUTS PREP: Performed by: INTERNAL MEDICINE

## 2017-08-29 PROCEDURE — G0364 BONE MARROW ASPIRATE &BIOPSY: HCPCS | Performed by: INTERNAL MEDICINE

## 2017-08-29 PROCEDURE — 40000847 ZZHCL STATISTIC MORPHOLOGY W/INTERP HISTOLOGY TC 85060: Performed by: INTERNAL MEDICINE

## 2017-08-29 PROCEDURE — 38221 DX BONE MARROW BIOPSIES: CPT | Performed by: INTERNAL MEDICINE

## 2017-08-29 PROCEDURE — 00000058 ZZHCL STATISTIC BONE MARROW ASP PERF TC 38220: Performed by: INTERNAL MEDICINE

## 2017-08-29 PROCEDURE — 88313 SPECIAL STAINS GROUP 2: CPT | Mod: 26 | Performed by: INTERNAL MEDICINE

## 2017-08-29 PROCEDURE — 40000424 ZZHCL STATISTIC BONE MARROW CORE PERF TC 38221: Performed by: INTERNAL MEDICINE

## 2017-08-29 PROCEDURE — 88311 DECALCIFY TISSUE: CPT | Performed by: INTERNAL MEDICINE

## 2017-08-29 PROCEDURE — 40000948 ZZHCL STATISTIC BONE MARROW ASP TC 85097: Performed by: INTERNAL MEDICINE

## 2017-08-29 NOTE — PROCEDURES
The patient with anemia (6g recently) was positively identified and informed consent was obtained (see the completed Affirmation of Consent for Bone Marrow Aspiration and/or Biopsy Procedure(s) form in the patient's chart). The patient was placed in the lateral position and the bony landmarks of the pelvis were identified. Medical staff reconfirmed the patient's name, date of birth and procedure. The skin over the posterior iliac crest was scrubbed and draped in a sterile fashion. The local area of the procedure was anesthetized with a total of 20 mL of 1% Lidocaine and a small incision was made.  The patient did not receive conscious sedation.    Trephine bone marrow core(s) was/were obtained from the right posterior iliac crest. Bone marrow aspirate was obtained from the right posterior iliac crest for: morphology with possible immunophenotyping and/or cytogenetics and molecular diagnostics    Direct pressure was applied to the biopsy site with sterile gauze. The biopsy site was cleaned with alcohol and a sterile dressing was placed over the biopsy incision using a pressure bandage. The patient was then placed in the supine position to maintain pressure on the biopsy site. Post-procedure wound care instructions, including routine dressing instructions and analgesia, were given to the patient. The procedure was completed without complication.

## 2017-08-29 NOTE — TELEPHONE ENCOUNTER
Called today asking when she should resume her eliquis.  She has been off of it since 8/9.  Per Dr. Cali she can restart it tomorrow.  She verbalized understanding of this.

## 2017-08-30 ENCOUNTER — TELEPHONE (OUTPATIENT)
Dept: FAMILY MEDICINE | Facility: CLINIC | Age: 82
End: 2017-08-30

## 2017-08-30 ENCOUNTER — THERAPY VISIT (OUTPATIENT)
Dept: PHYSICAL THERAPY | Facility: CLINIC | Age: 82
End: 2017-08-30
Payer: MEDICARE

## 2017-08-30 DIAGNOSIS — M25.512 SHOULDER PAIN, LEFT: ICD-10-CM

## 2017-08-30 DIAGNOSIS — D64.9 ANEMIA, UNSPECIFIED TYPE: Primary | ICD-10-CM

## 2017-08-30 LAB — COPATH REPORT: NORMAL

## 2017-08-30 PROCEDURE — G8985 CARRY GOAL STATUS: HCPCS | Mod: GP | Performed by: PHYSICAL THERAPIST

## 2017-08-30 PROCEDURE — 97112 NEUROMUSCULAR REEDUCATION: CPT | Mod: GP | Performed by: PHYSICAL THERAPIST

## 2017-08-30 PROCEDURE — G8986 CARRY D/C STATUS: HCPCS | Mod: GP | Performed by: PHYSICAL THERAPIST

## 2017-08-30 PROCEDURE — G8984 CARRY CURRENT STATUS: HCPCS | Mod: GP | Performed by: PHYSICAL THERAPIST

## 2017-08-30 PROCEDURE — 97140 MANUAL THERAPY 1/> REGIONS: CPT | Mod: GP | Performed by: PHYSICAL THERAPIST

## 2017-08-30 PROCEDURE — 97110 THERAPEUTIC EXERCISES: CPT | Mod: GP | Performed by: PHYSICAL THERAPIST

## 2017-08-30 NOTE — LETTER
[facility]  [address]  [facility] [address]  [phone]  2017  Re: Helene Gibbs   :   1935  MRN:  [id number]   REFERRING PHYSICIAN:   Caden Wilhelm    [facility]  Date of Initial Evaluation:  2017  Visits: 4 Rxs Used: 4  Reason for Referral:  Shoulder pain, left    DISCHARGE REPORT  Progress reporting period is from IE to 2017.       SUBJECTIVE    Subjective: Intermittant left UT ache in AM and PM.  Notes additional health concerns: low Hgb being followed by Hematology    Current pain level is 1/10.  Previous pain level was  7/10  .   Changes in function:  Yes (See Goal flowsheet attached for changes in current functional level)  Adverse reaction to treatment or activity: None    OBJECTIVE  Changes noted in objective findings:  Yes,   Objective: Poor posture due to fixed kyphosis, with ability to minimally correct.  Full AROM left shoulder.  Intermittant UT pain likely related to postural stress in AM and PM w static sitting     ASSESSMENT/PLAN  Updated problem list and treatment plan: Diagnosis 1:  Left shoulder pain  Pain -  home program  Impaired posture - home program  STG/LTGs have been met or progress has been made towards goals:  Yes (See Goal flow sheet completed today.)  Assessment of Progress: The patient has met all of their long term goals.  Self Management Plans:  Patient is independent in a home treatment program.  Patient is independent in self management of symptoms.  I have re-evaluated this patient and find that the nature, scope, duration and intensity of the therapy is appropriate for the medical condition of the patient.  Helene continues to require the following intervention to meet STG and LTG's:  PT intervention is no longer required to meet STG/LTG.    Recommendations:  This patient is ready to be discharged from therapy and continue their home  treatment program.      Thank you for your referral.    INQUIRIES  Therapist: Ghada Bowen PT, OCS, Cert. MDT  [facility]  [address] [place] [address] [id number]  [facility] [address]  Phone: [phone]  Fax: [phone]

## 2017-08-30 NOTE — TELEPHONE ENCOUNTER
Tried to reach her   Her hemoglobin is still low   She sent a note to me  Will check her hemoglobin again next week  Orders are placed.  Dr.Nasima Vilma MD

## 2017-08-31 PROBLEM — M25.512 SHOULDER PAIN, LEFT: Status: RESOLVED | Noted: 2017-07-26 | Resolved: 2017-08-31

## 2017-08-31 LAB — COPATH REPORT: NORMAL

## 2017-08-31 NOTE — PROGRESS NOTES
Subjective:    HPI                    Objective:    System    Physical Exam    General     ROS    Assessment/Plan:      DISCHARGE REPORT    Progress reporting period is from IE to 8/31/2017.       SUBJECTIVE    Subjective: Intermittant left UT ache in AM and PM.  Notes additional health concerns: low Hgb being followed by Hematology    Current pain level is 1/10.  Previous pain level was  7/10  .   Changes in function:  Yes (See Goal flowsheet attached for changes in current functional level)  Adverse reaction to treatment or activity: None    OBJECTIVE  Changes noted in objective findings:  Yes,   Objective: Poor posture due to fixed kyphosis, with ability to minimally correct.  Full AROM left shoulder.  Intermittant UT pain likely related to postural stress in AM and PM w static sitting     ASSESSMENT/PLAN  Updated problem list and treatment plan: Diagnosis 1:  Left shoulder pain  Pain -  home program  Impaired posture - home program  STG/LTGs have been met or progress has been made towards goals:  Yes (See Goal flow sheet completed today.)  Assessment of Progress: The patient has met all of their long term goals.  Self Management Plans:  Patient is independent in a home treatment program.  Patient is independent in self management of symptoms.  I have re-evaluated this patient and find that the nature, scope, duration and intensity of the therapy is appropriate for the medical condition of the patient.  Helene continues to require the following intervention to meet STG and LTG's:  PT intervention is no longer required to meet STG/LTG.    Recommendations:  This patient is ready to be discharged from therapy and continue their home treatment program.    Please refer to the daily flowsheet for treatment today, total treatment time and time spent performing 1:1 timed codes.

## 2017-09-01 NOTE — TELEPHONE ENCOUNTER
Called Pt and scheduled lab on 9/5. Pt wanted to get the results  From Dr. Esparza. Scheduled with Dr. Esparza 9/5 at 2:30  Following her lab appt

## 2017-09-01 NOTE — TELEPHONE ENCOUNTER
Reason for Call:  Patient calling  back    Detailed comments: Please call her back     Phone Number Patient can be reached at: Home number on file [phone] (home)    Best Time: anytime    Can we leave a detailed message on this number? YES    Call taken on 9/1/2017 at 7:58 AM by Josue Gomes

## 2017-09-01 NOTE — TELEPHONE ENCOUNTER
Routing to TC's. Please schedule Pt with lab only appointment per below.     The Pt continues to have a low Hgb and Dr. Esparza wants her to have it rechecked next week. Order has been placed. Thank you.     Ruba Mir RN

## 2017-09-01 NOTE — TELEPHONE ENCOUNTER
Let her know    Her hemoglobin is still low   She sent a note to me  Will check her hemoglobin again next week  Orders are placed.    Giancarlo Metzger MD

## 2017-09-04 LAB — COPATH REPORT: NORMAL

## 2017-09-05 ENCOUNTER — OFFICE VISIT (OUTPATIENT)
Dept: FAMILY MEDICINE | Facility: CLINIC | Age: 82
End: 2017-09-05
Payer: MEDICARE

## 2017-09-05 VITALS
OXYGEN SATURATION: 96 % | HEIGHT: 65 IN | WEIGHT: 166 LBS | HEART RATE: 70 BPM | DIASTOLIC BLOOD PRESSURE: 58 MMHG | SYSTOLIC BLOOD PRESSURE: 129 MMHG | BODY MASS INDEX: 27.66 KG/M2 | RESPIRATION RATE: 18 BRPM | TEMPERATURE: 97 F

## 2017-09-05 DIAGNOSIS — I48.91 ATRIAL FIBRILLATION, UNSPECIFIED TYPE (H): Primary | ICD-10-CM

## 2017-09-05 DIAGNOSIS — R10.13 DYSPEPSIA: ICD-10-CM

## 2017-09-05 DIAGNOSIS — D64.9 ANEMIA, UNSPECIFIED TYPE: ICD-10-CM

## 2017-09-05 DIAGNOSIS — Z23 NEED FOR PROPHYLACTIC VACCINATION AND INOCULATION AGAINST INFLUENZA: ICD-10-CM

## 2017-09-05 DIAGNOSIS — E87.1 HYPONATREMIA: ICD-10-CM

## 2017-09-05 LAB
COPATH REPORT: NORMAL
ERYTHROCYTE [DISTWIDTH] IN BLOOD BY AUTOMATED COUNT: 19.7 % (ref 10–15)
HCT VFR BLD AUTO: 28.3 % (ref 35–47)
HGB BLD-MCNC: 8.3 G/DL (ref 11.7–15.7)
MCH RBC QN AUTO: 23.2 PG (ref 26.5–33)
MCHC RBC AUTO-ENTMCNC: 29.3 G/DL (ref 31.5–36.5)
MCV RBC AUTO: 79 FL (ref 78–100)
PLATELET # BLD AUTO: 293 10E9/L (ref 150–450)
RBC # BLD AUTO: 3.57 10E12/L (ref 3.8–5.2)
WBC # BLD AUTO: 6.3 10E9/L (ref 4–11)

## 2017-09-05 PROCEDURE — 36415 COLL VENOUS BLD VENIPUNCTURE: CPT | Performed by: INTERNAL MEDICINE

## 2017-09-05 PROCEDURE — 80048 BASIC METABOLIC PNL TOTAL CA: CPT | Performed by: INTERNAL MEDICINE

## 2017-09-05 PROCEDURE — 85027 COMPLETE CBC AUTOMATED: CPT | Performed by: INTERNAL MEDICINE

## 2017-09-05 PROCEDURE — 99214 OFFICE O/P EST MOD 30 MIN: CPT | Performed by: INTERNAL MEDICINE

## 2017-09-05 RX ORDER — APIXABAN 5 MG/1
TABLET, FILM COATED ORAL
COMMUNITY
Start: 2017-09-04 | End: 2017-09-05 | Stop reason: DRUGHIGH

## 2017-09-05 NOTE — PATIENT INSTRUCTIONS
Discontinue Eliquis 5 mg   Starting tomorrow, take Eliquis 2.5 mg twice a day   Start zantac 150 mg twice a day  Come for lab appointment on Friday and wait for the results   Follow up in 4 weeks  Seek sooner medical attention if there is any worsening of symptoms or problems.

## 2017-09-05 NOTE — NURSING NOTE
"Chief Complaint   Patient presents with     Results     Recheck Hemoglobin        Initial /58 (BP Location: Right arm, Patient Position: Chair, Cuff Size: Adult Regular)  Pulse 70  Temp 97  F (36.1  C) (Oral)  Resp 18  Ht 5' 5\" (1.651 m)  Wt 166 lb (75.3 kg)  LMP  (LMP Unknown)  SpO2 96%  BMI 27.62 kg/m2 Estimated body mass index is 27.62 kg/(m^2) as calculated from the following:    Height as of this encounter: 5' 5\" (1.651 m).    Weight as of this encounter: 166 lb (75.3 kg).  Medication Reconciliation: complete   Jennifer Hinds CMA (AAMA)      "

## 2017-09-05 NOTE — LETTER
[facility]  [address] [facility]  [address]  [place]  [address]  Tel: [phone]    September 6, 2017    Helene BURGESS Sai  [address]   [place] [address]      Dear Ms. Gibbs,    Basic metabolic panel which includes electrolytes and kidney fucntion is satisfactory.   Hemoglobin is low and your are already scheduled to get recked on this Friday     If you have any further questions or problems, please contact our office.    Sincerely,  Neisha Esparza MD/NB  Results for orders placed or performed in visit on 09/05/17   CBC with platelets   Result Value Ref Range    WBC 6.3 4.0 - 11.0 10e9/L    RBC Count 3.57 (L) 3.8 - 5.2 10e12/L    Hemoglobin 8.3 (L) 11.7 - 15.7 g/dL    Hematocrit 28.3 (L) 35.0 - 47.0 %    MCV 79 78 - 100 fl    MCH 23.2 (L) 26.5 - 33.0 pg    MCHC 29.3 (L) 31.5 - 36.5 g/dL    RDW 19.7 (H) 10.0 - 15.0 %    Platelet Count 293 150 - 450 10e9/L   Basic metabolic panel   Result Value Ref Range    Sodium 134 133 - 144 mmol/L    Potassium 4.3 3.4 - 5.3 mmol/L    Chloride 99 94 - 109 mmol/L    Carbon Dioxide 27 20 - 32 mmol/L    Anion Gap 8 3 - 14 mmol/L    Glucose 102 (H) 70 - 99 mg/dL    Urea Nitrogen 27 7 - 30 mg/dL    Creatinine 1.26 (H) 0.52 - 1.04 mg/dL    GFR Estimate 41 (L) >60 mL/min/1.7m2    GFR Estimate If Black 49 (L) >60 mL/min/1.7m2    Calcium 10.1 8.5 - 10.1 mg/dL     *Note: Due to a large number of results and/or encounters for the requested time period, some results have not been displayed. A complete set of results can be found in Results Review.

## 2017-09-05 NOTE — MR AVS SNAPSHOT
After Visit Summary   9/5/2017    Helene Gibbs    MRN: [id number]           Patient Information     Date Of Birth          1935        Visit Information        Provider Department      9/5/2017 2:30 PM Neisha Esparza MD [facility]        Today's Diagnoses     Atrial fibrillation, unspecified type (H)    -  1    Anemia, unspecified type        Dyspepsia        Need for prophylactic vaccination and inoculation against influenza          Care Instructions    Discontinue Eliquis 5 mg   Starting tomorrow, take Eliquis 2.5 mg twice a day   Start zantac 150 mg twice a day  Come for lab appointment on Friday and wait for the results   Follow up in 4 weeks  Seek sooner medical attention if there is any worsening of symptoms or problems.            Follow-ups after your visit        Your next 10 appointments already scheduled     Sep 12, 2017  1:30 PM CDT   Return Visit with Estiven Cali MD   [facility] ([facility])    [facility]  [address]  [facility] [id number] [phone]            Oct 10, 2017  1:15 PM CDT   [place] with STONE TECH1   [facility] [place] ([facility])    [address]  [facility] [id number] [phone]              Future tests that were ordered for you today     Open Future Orders        Priority Expected Expires Ordered    Hemoglobin Routine 9/8/2017 9/28/2017 9/5/2017            Who to contact     If you have questions or need follow up information about today's clinic visit or your schedule please contact [facility] directly at [phone].  Normal or non-critical lab and imaging results will be communicated to you by MyChart, letter or phone within 4 business days after the clinic has received the results. If you do not hear from us within 7 days, please contact the clinic through MyChart or phone. If you have a critical  "or abnormal lab result, we will notify you by phone as soon as possible.  Submit refill requests through [employer] or call your pharmacy and they will forward the refill request to us. Please allow 3 business days for your refill to be completed.          Additional Information About Your Visit        K Spinehart Information     [employer] lets you send messages to your doctor, view your test results, renew your prescriptions, schedule appointments and more. To sign up, go to [email or web address] . Click on \"Log in\" on the left side of the screen, which will take you to the Welcome page. Then click on \"Sign up Now\" on the right side of the page.     You will be asked to enter the access code listed below, as well as some personal information. Please follow the directions to create your username and password.     Your access code is: [email or web address]  Expires: 10/24/2017  5:07 PM     Your access code will  in 90 days. If you need help or a new code, please call your [place] clinic or [phone].        Care EveryWhere ID     This is your Care EveryWhere ID. This could be used by other organizations to access your [place] medical records  [id number]        Your Vitals Were     Pulse Temperature Respirations Height Last Period Pulse Oximetry    70 97  F (36.1  C) (Oral) 18 5' 5\" (1.651 m) (LMP Unknown) 96%    BMI (Body Mass Index)                   27.62 kg/m2            Blood Pressure from Last 3 Encounters:   17 129/58   17 134/67   17 158/74    Weight from Last 3 Encounters:   17 166 lb (75.3 kg)   17 166 lb (75.3 kg)   17 166 lb (75.3 kg)                 Today's Medication Changes          These changes are accurate as of: 17  2:59 PM.  If you have any questions, ask your nurse or doctor.               Start taking these medicines.        Dose/Directions    ranitidine 150 MG tablet   Commonly known as:  ZANTAC   Used for:  Dyspepsia   Started by:  Neisha Esparza MD     "    Dose:  150 mg   Take 1 tablet (150 mg) by mouth 2 times daily   Quantity:  60 tablet   Refills:  1         These medicines have changed or have updated prescriptions.        Dose/Directions    * ELIQUIS 5 MG tablet   This may have changed:  Another medication with the same name was added. Make sure you understand how and when to take each.   Generic drug:  apixaban ANTICOAGULANT   Changed by:  Neisha Esparza MD        Refills:  0       * apixaban ANTICOAGULANT 2.5 MG tablet   Commonly known as:  ELIQUIS   This may have changed:  You were already taking a medication with the same name, and this prescription was added. Make sure you understand how and when to take each.   Used for:  Atrial fibrillation, unspecified type (H)   Changed by:  Neisha Esparza MD        Dose:  2.5 mg   Take 1 tablet (2.5 mg) by mouth 2 times daily   Quantity:  60 tablet   Refills:  1       * Notice:  This list has 2 medication(s) that are the same as other medications prescribed for you. Read the directions carefully, and ask your doctor or other care provider to review them with you.         Where to get your medicines      These medications were sent to [facility], [place] - [address], [address]  [address], [address], [facility] [address]     Phone:  [phone]     apixaban ANTICOAGULANT 2.5 MG tablet    ranitidine 150 MG tablet                Primary Care Provider Office Phone # Fax #    Neisha Esparza [phone] [address]  [facility] [address]        Goals        General    start date: 4/17/14 I will weigh myself daily and if any weight gain or shortness of breath I will call the clinic. (pt-stated)     Notes - Note created  4/17/2014  3:59 PM by Margareth Pichardo, RN    As of today's date 4/17/2014 goal is met at 0 - 25%.   Goal Status:  Active        Equal Access to Services     TONG TUCKER AH: jefe Stone  chasemaribell crocker waxay idiin hayaan adeeg kharash la'aan ah. So [facility] [phone].    ATENCIÓN: Si peter lloyd, tiene a gupta disposición servicios gratuitos de asistencia lingüística. Maxim al [phone].    We comply with applicable federal civil rights laws and [place] laws. We do not discriminate on the basis of race, color, national origin, age, disability sex, sexual orientation or gender identity.            Thank you!     Thank you for choosing [facility]  for your care. Our goal is always to provide you with excellent care. Hearing back from our patients is one way we can continue to improve our services. Please take a few minutes to complete the written survey that you may receive in the mail after your visit with us. Thank you!             Your Updated Medication List - Protect others around you: Learn how to safely use, store and throw away your medicines at www.disposemymeds.org.          This list is accurate as of: 9/5/17  2:59 PM.  Always use your most recent med list.                   Brand Name Dispense Instructions for use Diagnosis    ACCU-CHEK SMARTVIEW test strip   Generic drug:  blood glucose monitoring     100 each    1 strip by In Vitro route 2 times daily Or as directed    Type 2 diabetes mellitus with diabetic nephropathy (H)       albuterol 108 (90 BASE) MCG/ACT Inhaler    PROAIR HFA/PROVENTIL HFA/VENTOLIN HFA    1 Inhaler    Inhale 2 puffs into the lungs every 4 hours as needed for shortness of breath / dyspnea or wheezing    COPD (chronic obstructive pulmonary disease) (H)       betamethasone valerate 0.1 % cream    VALISONE    15 g    Apply topically 2 times daily Use sparingly    Rash       blood glucose monitoring lancets     1 Box    USE TO TEST BLOOD SUGAR TWO TIMES A DAY OR AS DIRECTED    Type 2 diabetes mellitus with diabetic nephropathy (H)       COMPOUNDED NON-CONTROLLED SUBSTANCE - PHARMACY TO MIX COMPOUNDED MEDICATION    CMPD RX    42  suppository    Vaginal valium- 10 mg suppository    Vaginal pain, Pelvic floor tension, Urinary retention       * ELIQUIS 5 MG tablet   Generic drug:  apixaban ANTICOAGULANT           * apixaban ANTICOAGULANT 2.5 MG tablet    ELIQUIS    60 tablet    Take 1 tablet (2.5 mg) by mouth 2 times daily    Atrial fibrillation, unspecified type (H)       fluocinolone 0.01 % solution    SYNALAR     Apply topically daily as needed (itchy scalp)        fluticasone 50 MCG/ACT spray    FLONASE    16 mL    INHALE 1 TO 2 SPRAYS INTO BOTH NOSTRILS DAILY    Rhinitis, unspecified type       furosemide 20 MG tablet    LASIX    180 tablet    TAKE 1-2 TABS BY MOUTH ONCE DAILY. MAY REPEAT AFTER NOON IF NEEDED FOR WEIGHT GAIN/2+ EDEMA    Swelling       GLIPIZIDE PO      Take 5 mg by mouth every morning (before breakfast)        HYDROcodone-acetaminophen 5-325 MG per tablet    NORCO    30 tablet    Take 0.5-1 tablets by mouth every 8 hours as needed for moderate to severe pain maximum 2 tablet(s) per day    Acute pain of left shoulder       hydrocortisone 2.5 % cream    ANUSOL-HC    28 g    Place rectally 2 times daily as needed for hemorrhoids    External hemorrhoids       hydrOXYzine 25 MG capsule    VISTARIL    180 capsule    Take 1 capsule (25 mg) by mouth 2 times daily as needed for itching    Itching       lisinopril 5 MG tablet    PRINIVIL/ZESTRIL     Take 2 tablets (10 mg) by mouth daily    Essential hypertension       magnesium hydroxide 400 MG/5ML suspension    MILK OF MAGNESIA     Take 30 mLs by mouth At Bedtime        nystatin 875021 UNIT/GM Powd    MYCOSTATIN    60 g    Apply topically 2 times daily as needed    Rash       order for DME     1 each    Equipment being ordered: AccuChek Smart View strips Patient tests twice daily.    Type II or unspecified type diabetes mellitus without mention of complication, not stated as uncontrolled       PRESERVISION/LUTEIN Caps      Take 1 capsule by mouth daily        ranitidine 150 MG  tablet    ZANTAC    60 tablet    Take 1 tablet (150 mg) by mouth 2 times daily    Dyspepsia       simvastatin 10 MG tablet    ZOCOR    90 tablet    Take 1 tablet (10 mg) by mouth At Bedtime    Type 2 diabetes mellitus with diabetic nephropathy, without long-term current use of insulin (H)       tiotropium 18 MCG capsule    SPIRIVA HANDIHALER    30 capsule    Inhale 1 capsule (18 mcg) into the lungs daily    Chronic obstructive pulmonary disease, unspecified COPD type (H)       traMADol 50 MG tablet    ULTRAM    100 tablet    Take 1 tablet (50 mg) by mouth every 6 hours as needed for moderate pain    Intractable back pain       * Notice:  This list has 2 medication(s) that are the same as other medications prescribed for you. Read the directions carefully, and ask your doctor or other care provider to review them with you.

## 2017-09-05 NOTE — PROGRESS NOTES
SUBJECTIVE:                                                    Helene Gibbs is a 82 year old female who presents to clinic today for the following health issues:    Anemia F/up  Hx of severe anemia and LE swelling   No clear etiology   Had blood transfusion which improved Hgb   GI workup was negative for bleeding however she is on Eliquis   Denies blood in stool   Reviewed Dr. Cali note on EPIC   Differential includes bleeding vs anemia of chronic disease/CKD vs myelodysplastic sx  Labs do not show deficiency of iron, B12 or folate  She does have elevated iron binding capacity and erythropoietin   Had bone marrow biopsy done but results still not available on EPIC  Reviewed other labs      Hemoglobin   Date Value Ref Range Status   09/05/2017 8.3 (L) 11.7 - 15.7 g/dL Final   08/29/2017 8.8 (L) 11.7 - 15.7 g/dL Final     Problem list and histories reviewed & adjusted, as indicated.  Additional history: as documented    Patient Active Problem List   Diagnosis     Type 2 diabetes mellitus with diabetic nephropathy (H)     Anemia     CKD (chronic kidney disease) stage 3, GFR 30-59 ml/min     Diabetes with proteinuria     Chronic low back pain     Pleural effusion, left     Adjustment reaction with anxiety and depression     c diff colitis 11-12-12     Pulmonary hypertension (H)     Melanosis of colon     Asymptomatic cholelithiasis     Diplopia     COPD (chronic obstructive pulmonary disease) (H)     Elevated TSH     CHF (congestive heart failure) (H)     Cardiac pacemaker in situ     Neck pain     Intractable back pain     Health Care Home     Mild cognitive impairment SLUMS = 22/ 30  CPT = 5.0/ 5.5 7-2014     Hypertension goal BP (blood pressure) < 140/90     Depression with anxiety     Constipation     Tobacco abuse: [age]y/o on 8-12-14 @ 1ppd=50 pk yr hx      Stasis dermatitis     Thoracic disc herniation     Obesity     Hyperlipidemia     Nausea and vomiting     Cholelithiasis with acute on chronic  cholecystitis     Cholecystitis     Slow transit constipation     Gastroesophageal reflux disease without esophagitis     Permanent atrial fibrillation (H)     Essential hypertension     Basal cell carcinoma of skin     Controlled substance agreement signed     Chronic pain syndrome     Tubular adenoma     ACP (advance care planning)     Past Surgical History:   Procedure Laterality Date     ARTHROSCOPY SHOULDER RT/LT  1999, 2004    Bilateral, right then left     BONE MARROW BIOPSY, BONE SPECIMEN, NEEDLE/TROCAR N/A 8/29/2017    Procedure: BIOPSY BONE MARROW;  BONE MARROW BIOPSY;  Surgeon: Marino Cohen MD;  Location:  GI     C DEXA, BONE DENSITY, AXIAL SKEL       C MAMMOGRAM, SCREENING  1/2009     COLONOSCOPY N/A 10/7/2016    Procedure: COMBINED COLONOSCOPY, SINGLE OR MULTIPLE BIOPSY/POLYPECTOMY BY BIOPSY;  Surgeon: Cassandra Mccord MD;  Location:  GI     ESOPHAGOSCOPY, GASTROSCOPY, DUODENOSCOPY (EGD), COMBINED N/A 8/10/2017    Procedure: COMBINED ESOPHAGOSCOPY, GASTROSCOPY, DUODENOSCOPY (EGD), BIOPSY SINGLE OR MULTIPLE;  gastroscopy;  Surgeon: Helene Bustamante MD;  Location:  GI     H ABLATION AV NODE  10/2012     HC COLONOSCOPY THRU STOMA, DIAGNOSTIC  ? 2005     IMPLANT PACEMAKER  10/2012    [place] [id number], [id number]     JOINT REPLACEMTN, KNEE RT/LT      Joint Replacement knee bilateral     LAPAROSCOPIC CHOLECYSTECTOMY WITH CHOLANGIOGRAMS N/A 12/14/2015    Procedure: LAPAROSCOPIC CHOLECYSTECTOMY WITH CHOLANGIOGRAMS;  Surgeon: Ervin Amos MD;  Location:  OR     LUMPECTOMY BREAST      Left-2004     PHACOEMULSIFICATION CLEAR CORNEA WITH STANDARD INTRAOCULAR LENS IMPLANT Left 7/16/2015    Procedure: PHACOEMULSIFICATION CLEAR CORNEA WITH STANDARD INTRAOCULAR LENS IMPLANT;  Surgeon: Sai Obregon MD;  Location:  EC     PHACOEMULSIFICATION CLEAR CORNEA WITH TORIC INTRAOCULAR LENS IMPLANT Right 5/9/2017    Procedure: PHACOEMULSIFICATION CLEAR CORNEA WITH TORIC INTRAOCULAR LENS  IMPLANT;  RIGHT EYE PHACOEMULSIFICATION CLEAR CORNEA WITH TORIC INTRAOCULAR LENS IMPLANT ;  Surgeon: Duc Richmond MD;  Location: [facility]       Social History   Substance Use Topics     Smoking status: Current Some Day Smoker     Packs/day: 0.25     Years: 45.00     Types: Cigarettes     Smokeless tobacco: Never Used      Comment: 01/25/15 6 or less cigarettes per day, intermittent     Alcohol use No     Family History   Problem Relation Age of Onset     Hypertension Mother      DIABETES Mother       at 83 yrs     C.A.D. Father       age 70s     Breast Cancer No family hx of      Colon Cancer No family hx of          Current Outpatient Prescriptions   Medication Sig Dispense Refill     ELIQUIS 5 MG tablet        HYDROcodone-acetaminophen (NORCO) 5-325 MG per tablet Take 0.5-1 tablets by mouth every 8 hours as needed for moderate to severe pain maximum 2 tablet(s) per day 30 tablet 0     lisinopril (PRINIVIL/ZESTRIL) 5 MG tablet Take 2 tablets (10 mg) by mouth daily       GLIPIZIDE PO Take 5 mg by mouth every morning (before breakfast)       furosemide (LASIX) 20 MG tablet TAKE 1-2 TABS BY MOUTH ONCE DAILY. MAY REPEAT AFTER NOON IF NEEDED FOR WEIGHT GAIN/2+ EDEMA 180 tablet 1     tiotropium (SPIRIVA HANDIHALER) 18 MCG capsule Inhale 1 capsule (18 mcg) into the lungs daily 30 capsule 3     traMADol (ULTRAM) 50 MG tablet Take 1 tablet (50 mg) by mouth every 6 hours as needed for moderate pain 100 tablet 1     simvastatin (ZOCOR) 10 MG tablet Take 1 tablet (10 mg) by mouth At Bedtime 90 tablet 1     hydrOXYzine (VISTARIL) 25 MG capsule Take 1 capsule (25 mg) by mouth 2 times daily as needed for itching 180 capsule 1     fluticasone (FLONASE) 50 MCG/ACT spray INHALE 1 TO 2 SPRAYS INTO BOTH NOSTRILS DAILY 16 mL 3     COMPOUND (CMPD RX) - PHARMACY TO MIX COMPOUNDED MEDICATION Vaginal valium- 10 mg suppository 42 suppository 3     fluocinolone (SYNALAR) 0.01 % external solution Apply topically daily as needed  (itchy scalp)   1     betamethasone valerate (VALISONE) 0.1 % cream Apply topically 2 times daily Use sparingly 15 g 1     hydrocortisone (ANUSOL-HC) 2.5 % rectal cream Place rectally 2 times daily as needed for hemorrhoids 28 g 1     ACCU-CHEK SMARTVIEW test strip 1 strip by In Vitro route 2 times daily Or as directed 100 each 1     blood glucose monitoring (ACCU-CHEK FASTCLIX) lancets USE TO TEST BLOOD SUGAR TWO TIMES A DAY OR AS DIRECTED 1 Box 11     Multiple Vitamins-Minerals (PRESERVISION/LUTEIN) CAPS Take 1 capsule by mouth daily        nystatin (MYCOSTATIN) 549731 UNIT/GM POWD Apply topically 2 times daily as needed 60 g 1     ORDER FOR DME, SET TO LOCAL PRINT, Equipment being ordered: AccuChek Smart View strips  Patient tests twice daily. 1 each 11     albuterol (PROAIR HFA, PROVENTIL HFA, VENTOLIN HFA) 108 (90 BASE) MCG/ACT inhaler Inhale 2 puffs into the lungs every 4 hours as needed for shortness of breath / dyspnea or wheezing 1 Inhaler 5     magnesium hydroxide (MILK OF MAGNESIA) 400 MG/5ML suspension Take 30 mLs by mouth At Bedtime        Allergies   Allergen Reactions     Penicillins Hives     Ambien [Zolpidem Tartrate]      Hallucinations and fell out of bed at night.     Definity      Caused a syncopal episode.     Sulfa Drugs Itching     Cymbalta Rash     Fluconazole Rash     Labs reviewed in EPIC      ROS:  Constitutional, neuro, ENT, endocrine, pulmonary, cardiac, gastrointestinal, genitourinary, musculoskeletal, integument and psychiatric systems are negative, except as otherwise noted.    This document serves as a record of the services and decisions personally performed and made by Neisha Esparza MD. It was created on her behalf by Cathy Long, a trained medical scribe. The creation of this document is based the provider's statements to the medical scribe.    Keri Long 2:40 PM, September 5, 2017    OBJECTIVE:                                                    /58 (BP  "Location: Right arm, Patient Position: Chair, Cuff Size: Adult Regular)  Pulse 70  Temp 97  F (36.1  C) (Oral)  Resp 18  Ht 5' 5\" (1.651 m)  Wt 166 lb (75.3 kg)  LMP  (LMP Unknown)  SpO2 96%  BMI 27.62 kg/m2  Body mass index is 27.62 kg/(m^2).  GENERAL APPEARANCE: ambulate with help of a walker, alert and no distress  CV: RRR, peripheral pulse regular in both upper extremities   PSYCH: mentation appears normal and affect normal/bright    Diagnostic Test Results:  Results for orders placed or performed in visit on 09/05/17 (from the past 24 hour(s))   CBC with platelets   Result Value Ref Range    WBC 6.3 4.0 - 11.0 10e9/L    RBC Count 3.57 (L) 3.8 - 5.2 10e12/L    Hemoglobin 8.3 (L) 11.7 - 15.7 g/dL    Hematocrit 28.3 (L) 35.0 - 47.0 %    MCV 79 78 - 100 fl    MCH 23.2 (L) 26.5 - 33.0 pg    MCHC 29.3 (L) 31.5 - 36.5 g/dL    RDW 19.7 (H) 10.0 - 15.0 %    Platelet Count 293 150 - 450 10e9/L     *Note: Due to a large number of results and/or encounters for the requested time period, some results have not been displayed. A complete set of results can be found in Results Review.        ASSESSMENT/PLAN:                                                    Helene was seen today for results.    Diagnoses and all orders for this visit:    Atrial fibrillation, unspecified type (H)  -     apixaban ANTICOAGULANT (ELIQUIS) 2.5 MG tablet; Take 1 tablet (2.5 mg) by mouth 2 times daily  S/p ablation with pacemaker implant in 2012  Had multiple questions regarding Eliquis  Her Hgb today showed slight drop   She was seen by cardiologist Dr Juarez back in February 2017  He did mention a possibility of cutting down Eliquis dose due to kidney function if needed   In her case, I believe it will be safer to cut down to 2.5mg b.i.d due to low Hgb and possibility of GI bleed  She will return Friday to recheck labs and f/up in 4 weeks   We discussed the risk and benefit of Eliquis.  She wants to continue this with lower dose.  If hb " drops more than will discontinue eliquis     Anemia, unspecified type  -     Hemoglobin; Future  Hgb today showed slight drop   She will follow with Dr. Cali to discuss results from bone marrow biopsy   She will return on Friday to recheck labs     Hemoglobin   Date Value Ref Range Status   09/05/2017 8.3 (L) 11.7 - 15.7 g/dL Final   08/29/2017 8.8 (L) 11.7 - 15.7 g/dL Final     Dyspepsia  -     ranitidine (ZANTAC) 150 MG tablet; Take 1 tablet (150 mg) by mouth 2 times daily    Need for prophylactic vaccination and inoculation against influenza  Will do in on next visit     Patient Instructions   Discontinue Eliquis 5 mg   Starting tomorrow, take Eliquis 2.5 mg twice a day   Start zantac 150 mg twice a day  Come for lab appointment on Friday and wait for the results   Follow up in 4 weeks  Seek sooner medical attention if there is any worsening of symptoms or problems.      The information in this document, created by the medical scribe for me, accurately reflects the services I personally performed and the decisions made by me. I have reviewed and approved this document for accuracy prior to leaving the patient care area.    Neisha Esparza MD  [facility]

## 2017-09-06 LAB
ANION GAP SERPL CALCULATED.3IONS-SCNC: 8 MMOL/L (ref 3–14)
BUN SERPL-MCNC: 27 MG/DL (ref 7–30)
CALCIUM SERPL-MCNC: 10.1 MG/DL (ref 8.5–10.1)
CHLORIDE SERPL-SCNC: 99 MMOL/L (ref 94–109)
CO2 SERPL-SCNC: 27 MMOL/L (ref 20–32)
CREAT SERPL-MCNC: 1.26 MG/DL (ref 0.52–1.04)
GFR SERPL CREATININE-BSD FRML MDRD: 41 ML/MIN/1.7M2
GLUCOSE SERPL-MCNC: 102 MG/DL (ref 70–99)
POTASSIUM SERPL-SCNC: 4.3 MMOL/L (ref 3.4–5.3)
SODIUM SERPL-SCNC: 134 MMOL/L (ref 133–144)

## 2017-09-06 NOTE — PROGRESS NOTES
Please notify patient ( send the letter)  Basic metabolic panel which includes electrolytes and kidney fucntion is satisfactory.  Hemoglobin is low and your are already scheduled to get recked on this Friday  Please send the copy of lab results also to this patient.

## 2017-09-08 ENCOUNTER — TELEPHONE (OUTPATIENT)
Dept: FAMILY MEDICINE | Facility: CLINIC | Age: 82
End: 2017-09-08

## 2017-09-08 DIAGNOSIS — Z79.899 MEDICATION MANAGEMENT: ICD-10-CM

## 2017-09-08 DIAGNOSIS — D64.9 ANEMIA, UNSPECIFIED TYPE: ICD-10-CM

## 2017-09-08 DIAGNOSIS — Z79.899 MEDICATION MANAGEMENT: Primary | ICD-10-CM

## 2017-09-08 LAB — HGB BLD-MCNC: 8.1 G/DL (ref 11.7–15.7)

## 2017-09-08 PROCEDURE — 36415 COLL VENOUS BLD VENIPUNCTURE: CPT | Performed by: INTERNAL MEDICINE

## 2017-09-08 PROCEDURE — 85018 HEMOGLOBIN: CPT | Performed by: INTERNAL MEDICINE

## 2017-09-08 PROCEDURE — 80048 BASIC METABOLIC PNL TOTAL CA: CPT | Performed by: INTERNAL MEDICINE

## 2017-09-08 PROCEDURE — 82728 ASSAY OF FERRITIN: CPT | Performed by: INTERNAL MEDICINE

## 2017-09-09 LAB
ANION GAP SERPL CALCULATED.3IONS-SCNC: 9 MMOL/L (ref 3–14)
BUN SERPL-MCNC: 27 MG/DL (ref 7–30)
CALCIUM SERPL-MCNC: 10.1 MG/DL (ref 8.5–10.1)
CHLORIDE SERPL-SCNC: 98 MMOL/L (ref 94–109)
CO2 SERPL-SCNC: 28 MMOL/L (ref 20–32)
CREAT SERPL-MCNC: 1.41 MG/DL (ref 0.52–1.04)
FERRITIN SERPL-MCNC: 6 NG/ML (ref 8–252)
GFR SERPL CREATININE-BSD FRML MDRD: 36 ML/MIN/1.7M2
GLUCOSE SERPL-MCNC: 183 MG/DL (ref 70–99)
POTASSIUM SERPL-SCNC: 4.6 MMOL/L (ref 3.4–5.3)
SODIUM SERPL-SCNC: 135 MMOL/L (ref 133–144)

## 2017-09-09 NOTE — TELEPHONE ENCOUNTER
Hemoglobin is slowly dropping   She was in clinic so we had discussion  We lowered the dose of Eliquis to 2.5 mg twice daily.  Recheck hemoglobin at office visit with .  Follow up in one week after visit with .  IV iron discussed and she agreed.  Dr.Nasima Vilma MD

## 2017-09-11 ENCOUNTER — TELEPHONE (OUTPATIENT)
Dept: ONCOLOGY | Facility: CLINIC | Age: 82
End: 2017-09-11

## 2017-09-11 ENCOUNTER — TELEPHONE (OUTPATIENT)
Dept: FAMILY MEDICINE | Facility: CLINIC | Age: 82
End: 2017-09-11

## 2017-09-11 DIAGNOSIS — K92.2 GASTROINTESTINAL HEMORRHAGE, UNSPECIFIED GASTROINTESTINAL HEMORRHAGE TYPE: Primary | ICD-10-CM

## 2017-09-11 NOTE — PROGRESS NOTES
Spoke to kian and informed her about low hemoglobin and low iron.  Will arrange IV iron for her she agreed   She has appointment with  tomorrow

## 2017-09-11 NOTE — TELEPHONE ENCOUNTER
Called and spoke with Infusion Center - Dr. Cali's Nurse.   They will attempt to arrange IV iron for the Pt, preferably directly following her appointment with Dr. Cali.   If they are not able to arrange the infusion directly after Dr. Cali's appointment, they will call and schedule something in the morning for the Pt.     Called the Pt and she agrees with plan of care.     Ruba Mir RN

## 2017-09-11 NOTE — TELEPHONE ENCOUNTER
Ruba with [facility] called to see that patient get on to be scheduled for iron tomorrow either after or before 1:30p clinic appointmnent with Dr. Cali. I informed Ruba that I would talk with Dr. Cali to obtain orders and work schedule. I will update patient with any changes to tomorrow appointment. Marifer Katz

## 2017-09-11 NOTE — TELEPHONE ENCOUNTER
Iron orders are pended, please review and sign.     Per Dr. Esparza - the Pt is having an active GI bleed. Once orders are signed we will call the infusion center and see if we can coordinate the Iron administration to be tomorrow.     Ruba Mir RN

## 2017-09-12 ENCOUNTER — ONCOLOGY VISIT (OUTPATIENT)
Dept: ONCOLOGY | Facility: CLINIC | Age: 82
End: 2017-09-12
Attending: INTERNAL MEDICINE
Payer: MEDICARE

## 2017-09-12 ENCOUNTER — HOSPITAL ENCOUNTER (OUTPATIENT)
Facility: CLINIC | Age: 82
Setting detail: SPECIMEN
Discharge: HOME | End: 2017-09-12
Attending: INTERNAL MEDICINE | Admitting: INTERNAL MEDICINE
Payer: MEDICARE

## 2017-09-12 VITALS
TEMPERATURE: 97.4 F | HEART RATE: 70 BPM | DIASTOLIC BLOOD PRESSURE: 77 MMHG | OXYGEN SATURATION: 97 % | RESPIRATION RATE: 16 BRPM | WEIGHT: 168.4 LBS | BODY MASS INDEX: 28.02 KG/M2 | SYSTOLIC BLOOD PRESSURE: 168 MMHG

## 2017-09-12 DIAGNOSIS — E61.1 IRON DEFICIENCY: ICD-10-CM

## 2017-09-12 DIAGNOSIS — D64.9 ANEMIA, UNSPECIFIED TYPE: Primary | ICD-10-CM

## 2017-09-12 LAB — HGB BLD-MCNC: 8.3 G/DL (ref 11.7–15.7)

## 2017-09-12 PROCEDURE — 36415 COLL VENOUS BLD VENIPUNCTURE: CPT

## 2017-09-12 PROCEDURE — 99214 OFFICE O/P EST MOD 30 MIN: CPT | Performed by: INTERNAL MEDICINE

## 2017-09-12 PROCEDURE — 85018 HEMOGLOBIN: CPT | Performed by: INTERNAL MEDICINE

## 2017-09-12 PROCEDURE — 99211 OFF/OP EST MAY X REQ PHY/QHP: CPT

## 2017-09-12 ASSESSMENT — PAIN SCALES - GENERAL: PAINLEVEL: NO PAIN (0)

## 2017-09-12 NOTE — PATIENT INSTRUCTIONS
Hemoglobin today.- Drawn by YOBANY Pedraza  IV injectafer x 2 doses.  Scheduled/Irina  Follow up 3-4 weeks after IV iron with CBC and iron studies.  Scheduled/Irina    AVS printed & given to patient/Irina

## 2017-09-12 NOTE — PROGRESS NOTES
"Oncology Rooming Note    September 12, 2017 1:31 PM   Helene Gibbs is a 82 year old female who presents for:    Chief Complaint   Patient presents with     Oncology Clinic Visit     Tubular adenoma     Initial Vitals: /77 (BP Location: Left arm, Patient Position: Sitting, Cuff Size: Adult Regular)  Pulse 70  Temp 97.4  F (36.3  C) (Oral)  Resp 16  Wt 76.4 kg (168 lb 6.4 oz)  LMP  (LMP Unknown)  SpO2 97%  BMI 28.02 kg/m2 Estimated body mass index is 28.02 kg/(m^2) as calculated from the following:    Height as of 9/5/17: 1.651 m (5' 5\").    Weight as of this encounter: 76.4 kg (168 lb 6.4 oz). Body surface area is 1.87 meters squared.  No Pain (0) Comment: Data Unavailable   No LMP recorded (lmp unknown). Patient is postmenopausal.  Allergies reviewed: Yes  Medications reviewed: Yes    Medications: Medication refills not needed today.  Pharmacy name entered into [facility]:    [facility] - [place], [place] - [address]  [id number] - [place], [place] - [address] [address] [facility], [place] - [address] AT [address] [place] [address]    Clinical concerns: None          One unsuccessful attempted lab draw from the RAC using a 21 g BF needle, band aid applied- LW                 4 minutes for nursing intake (face to face time)     Radha Sadler MA      Medical Assistant Note:  Helene Gibbs presents today for labs.    Patient seen by provider today: Yes: Viraj.   present during visit today: Not Applicable.    Concerns: No Concerns.    Procedure:  Lab draw site: Left Hand, Needle type: BF, Gauge: 23.    Post Assessment:  Labs drawn without difficulty: Yes.    Discharge Plan:  Pt tolerated procedure well, and said she could tell my years of experience...   Escorted to lobby to wait for results.    Face to Face Time: 8min.    Keila Jacobsen MA                DISCHARGE PLAN:  Next appointments: See patient instruction section.  Brought " the patient to the lobby for scheduling  Departure Mode: Walker  Accompanied by: daughter  5 minutes for nursing discharge (face to face time)   Radha Sadler MA

## 2017-09-12 NOTE — MR AVS SNAPSHOT
After Visit Summary   9/12/2017    Helene Gibbs    MRN: [id number]           Patient Information     Date Of Birth          1935        Visit Information        Provider Department      9/12/2017 1:30 PM Estiven Cali MD [facility]        Today's Diagnoses     Anemia, unspecified type    -  1    Iron deficiency          Care Instructions    Hemoglobin today.- Drawn by YOBANY Pedraza  IV injectafer x 2 doses.  Follow up 3-4 weeks after IV iron with CBC and iron studies.          Follow-ups after your visit        Your next 10 appointments already scheduled     Sep 15, 2017  1:00 PM CDT   Level 1 with  INFUSION CHAIR 19   [facility] ([facility])    [facility]  [address]  [place] [address]   [phone]            Sep 22, 2017  2:00 PM CDT   Level 1 with  INFUSION CHAIR 4   [facility] ([facility])    [facility] [place]  [address]  [place] [address] [phone]            Oct 10, 2017  1:15 PM CDT   Teletrace with STONE TECH1   [facility] [place] ([facility])    [address]  [place] [address] [phone]            Oct 11, 2017  1:45 PM CDT   Return Visit with  Oncology Nurse   [facility] ([facility])    [facility] [place]  [address]  [place] [address] [phone]            Oct 17, 2017  2:00 PM CDT   Return Visit with Estiven Cali MD   [facility] ([facility])    [facility] [place]  [address]  [place] [address] [phone]              Future tests that were ordered for you today     Open Future Orders        Priority Expected Expires Ordered    CBC with platelets Routine 11/1/2017 12/12/2017 9/12/2017    Iron and iron  "binding capacity Routine 2017    Ferritin Routine 2017    Reticulocyte count Routine 2017            Who to contact     If you have questions or need follow up information about today's clinic visit or your schedule please contact [facility] directly at [phone].  Normal or non-critical lab and imaging results will be communicated to you by [facility], letter or phone within 4 business days after the clinic has received the results. If you do not hear from us within 7 days, please contact the clinic through "[employer]t or phone. If you have a critical or abnormal lab result, we will notify you by phone as soon as possible.  Submit refill requests through [employer] or call your pharmacy and they will forward the refill request to us. Please allow 3 business days for your refill to be completed.          Additional Information About Your Visit        [employer] Information     [employer] lets you send messages to your doctor, view your test results, renew your prescriptions, schedule appointments and more. To sign up, go to [email or web address] . Click on \"Log in\" on the left side of the screen, which will take you to the Welcome page. Then click on \"Sign up Now\" on the right side of the page.     You will be asked to enter the access code listed below, as well as some personal information. Please follow the directions to create your username and password.     Your access code is: [email or web address]  Expires: 10/24/2017  5:07 PM     Your access code will  in 90 days. If you need help or a new code, please call your [facility] clinic or [phone].        Care EveryWhere ID     This is your Care EveryWhere ID. This could be used by other organizations to access your [facility] medical records  [id number]        Your Vitals Were     Pulse Temperature Respirations Last Period Pulse Oximetry BMI (Body Mass Index)    70 97.4  F (36.3  C) " (Oral) 16 (LMP Unknown) 97% 28.02 kg/m2       Blood Pressure from Last 3 Encounters:   09/12/17 168/77   09/05/17 129/58   08/29/17 134/67    Weight from Last 3 Encounters:   09/12/17 76.4 kg (168 lb 6.4 oz)   09/05/17 75.3 kg (166 lb)   08/29/17 75.3 kg (166 lb)              We Performed the Following     Hemoglobin          Today's Medication Changes          These changes are accurate as of: 9/12/17  2:28 PM.  If you have any questions, ask your nurse or doctor.               Stop taking these medicines if you haven't already. Please contact your care team if you have questions.     fluocinolone 0.01 % solution   Commonly known as:  SYNALAR                    Primary Care Provider Office Phone # Fax #    Neisha MARIMAR Esparza [phone] [address]  [place]                [place] [address]        Goals        General    start date: 4/17/14 I will weigh myself daily and if any weight gain or shortness of breath I will call the clinic. (pt-stated)     Notes - Note created  4/17/2014  3:59 PM by Margareth Pichardo RN    As of today's date 4/17/2014 goal is met at 0 - 25%.   Goal Status:  Active        Equal Access to Services     TONG TUCKER AH: Hadii aad ku hadasho Sojovanyali, waaxda luqadaha, qaybta kaalmada adeegyada, pamela guerrain hayaan rachel mccullough . So [place] -[phone].    ATENCIÓN: Si habla español, tiene a gupta disposición servicios gratuitos de asistencia lingüística. Llame al [phone].    We comply with applicable federal civil rights laws and [place] laws. We do not discriminate on the basis of race, color, national origin, age, disability sex, sexual orientation or gender identity.            Thank you!     Thank you for choosing [facility]  for your care. Our goal is always to provide you with excellent care. Hearing back from our patients is one way we can continue to improve our services. Please take a few minutes to complete the written survey that you  may receive in the mail after your visit with us. Thank you!             Your Updated Medication List - Protect others around you: Learn how to safely use, store and throw away your medicines at www.disposemymeds.org.          This list is accurate as of: 9/12/17  2:28 PM.  Always use your most recent med list.                   Brand Name Dispense Instructions for use Diagnosis    ACCU-CHEK SMARTVIEW test strip   Generic drug:  blood glucose monitoring     100 each    1 strip by In Vitro route 2 times daily Or as directed    Type 2 diabetes mellitus with diabetic nephropathy (H)       albuterol 108 (90 BASE) MCG/ACT Inhaler    PROAIR HFA/PROVENTIL HFA/VENTOLIN HFA    1 Inhaler    Inhale 2 puffs into the lungs every 4 hours as needed for shortness of breath / dyspnea or wheezing    COPD (chronic obstructive pulmonary disease) (H)       apixaban ANTICOAGULANT 2.5 MG tablet    ELIQUIS    60 tablet    Take 1 tablet (2.5 mg) by mouth 2 times daily    Atrial fibrillation, unspecified type (H)       betamethasone valerate 0.1 % cream    VALISONE    15 g    Apply topically 2 times daily Use sparingly    Rash       blood glucose monitoring lancets     1 Box    USE TO TEST BLOOD SUGAR TWO TIMES A DAY OR AS DIRECTED    Type 2 diabetes mellitus with diabetic nephropathy (H)       COMPOUNDED NON-CONTROLLED SUBSTANCE - PHARMACY TO MIX COMPOUNDED MEDICATION    CMPD RX    42 suppository    Vaginal valium- 10 mg suppository    Vaginal pain, Pelvic floor tension, Urinary retention       fluticasone 50 MCG/ACT spray    FLONASE    16 mL    INHALE 1 TO 2 SPRAYS INTO BOTH NOSTRILS DAILY    Rhinitis, unspecified type       furosemide 20 MG tablet    LASIX    180 tablet    TAKE 1-2 TABS BY MOUTH ONCE DAILY. MAY REPEAT AFTER NOON IF NEEDED FOR WEIGHT GAIN/2+ EDEMA    Swelling       GLIPIZIDE PO      Take 5 mg by mouth every morning (before breakfast)        HYDROcodone-acetaminophen 5-325 MG per tablet    NORCO    30 tablet    Take 0.5-1  tablets by mouth every 8 hours as needed for moderate to severe pain maximum 2 tablet(s) per day    Acute pain of left shoulder       hydrocortisone 2.5 % cream    ANUSOL-HC    28 g    Place rectally 2 times daily as needed for hemorrhoids    External hemorrhoids       hydrOXYzine 25 MG capsule    VISTARIL    180 capsule    Take 1 capsule (25 mg) by mouth 2 times daily as needed for itching    Itching       lisinopril 5 MG tablet    PRINIVIL/ZESTRIL     Take 2 tablets (10 mg) by mouth daily    Essential hypertension       magnesium hydroxide 400 MG/5ML suspension    MILK OF MAGNESIA     Take 30 mLs by mouth At Bedtime        nystatin 541185 UNIT/GM Powd    MYCOSTATIN    60 g    Apply topically 2 times daily as needed    Rash       order for DME     1 each    Equipment being ordered: AccuChek Smart View strips Patient tests twice daily.    Type II or unspecified type diabetes mellitus without mention of complication, not stated as uncontrolled       PRESERVISION/LUTEIN Caps      Take 1 capsule by mouth daily        ranitidine 150 MG tablet    ZANTAC    60 tablet    Take 1 tablet (150 mg) by mouth 2 times daily    Dyspepsia       simvastatin 10 MG tablet    ZOCOR    90 tablet    Take 1 tablet (10 mg) by mouth At Bedtime    Type 2 diabetes mellitus with diabetic nephropathy, without long-term current use of insulin (H)       tiotropium 18 MCG capsule    SPIRIVA HANDIHALER    30 capsule    Inhale 1 capsule (18 mcg) into the lungs daily    Chronic obstructive pulmonary disease, unspecified COPD type (H)       traMADol 50 MG tablet    ULTRAM    100 tablet    Take 1 tablet (50 mg) by mouth every 6 hours as needed for moderate pain    Intractable back pain

## 2017-09-13 ENCOUNTER — TELEPHONE (OUTPATIENT)
Dept: FAMILY MEDICINE | Facility: CLINIC | Age: 82
End: 2017-09-13

## 2017-09-13 ENCOUNTER — TELEPHONE (OUTPATIENT)
Dept: CARDIOLOGY | Facility: CLINIC | Age: 82
End: 2017-09-13

## 2017-09-13 DIAGNOSIS — D64.89 ANEMIA DUE TO OTHER CAUSE: Primary | ICD-10-CM

## 2017-09-13 NOTE — TELEPHONE ENCOUNTER
Pt called and LM stating that on 9/8 she was admitted to hospital for low hgb-6.8. Pt received 2 blood transfusions while in hospital and outpatient bone marrow biopsy (8/29). Pt is seeing Hemotology (anemia). Pt will begin having iron infusions beginning this Friday. Pt wanted Dr Juarez aware that Eliquis was stopped, but has been restarted at the reduced dose of 2.5 mg. Pt most recent Hgb is 8.3. Will message Dr Juarez to make aware. JNelsonRN

## 2017-09-13 NOTE — PROGRESS NOTES
HEMATOLOGY HISTORY: Ms. Helene Gibbs is a female with anemia.   1.  On 09/26/2016, hemoglobin of 15.7.   2.  On 04/25/2017, hemoglobin of 11.8 with MCV of 93.   3.  On 08/09/2017:  -WBC of 7.4, hemoglobin of 6.6 and platelet of 299.  MCV of 79.  RDW of 17.1.   -Creatinine of 1.46.    -Calcium of 10.0.    -LFT is normal.    -Stool for occult blood positive.   4.  On 08/10/2017:  -Iron of 81 and saturation of 18%.    -Vitamin B12 1076.   -Folate of 17.8.   5.  EGD on 08/10/2017 reveals erythematous mucosa in the antrum.  Normal esophagus and duodenum. Biopsy reveals focal chronic inflammation.  No H. pylori.  No malignancy.   6.  Colonoscopy on 10/07/2016 had revealed tiny polyps and diverticulosis.  Otherwise normal.  Pathology revealed tubular adenoma without any high-grade dysplasia.   7. Multiple labs were done on is 08/22/2017.   -Erythropoietin of 138.   -LDH of 188.   -Serum protein electrophoresis reveals M spike of 0.1.   -Immunofixation reveals IgM lambda, IgM level of 197.   8. Bone marrow aspiration biopsy was done on 08/29/2017.  It reveals normocellular bone marrow with 20% cellularity.  There is trilineage hematopoietic maturation.  No evidence of dysplasia.  No increase in blasts.  Flow cytometry is normal.  Iron stains revealed decreased storage iron.     SUBJECTIVE:    Ms. Helene Gibbs is an 82-year-old female who is here for followup on the result of bone marrow biopsy.  She was recently seen for anemia. On 04/25/2017, hemoglobin was 11.8.  It decreased to 6.6 on 08/09/2017.  She had multiple workups done.  Iron, vitamin B12 and folate are normal.  EGD and colonoscopy did not reveal any evidence of bleeding. Patient is on Eliquis for atrial fibrillation.  Denies bleeding from any site.      Multiple labs were done on is 08/22/2017.   -- Erythropoietin of 138.   -- LDH of 188.   -- Serum protein electrophoresis reveals M spike of 0.1.   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       Bone marrow aspiration biopsy was done on 08/29/2017.  It reveals normocellular bone marrow with 20% cellularity.  There is trilineage hematopoietic maturation.  No evidence of dysplasia.  No increase in blasts.  Flow cytometry is normal.  Iron stains revealed decreased storage iron.      Overall, patient's condition is stable.  She has fatigue.  She also has some pedal edema.  No shortness of breath.  No chest pain.      ASSESSMENT:   1.  An 82-year-old female with normochromic, normocytic anemia.  This is multifactorial from hypocellular bone marrow, chronic kidney disease, anemia of chronic medical disease and iron deficiency.  Acute worsening of anemia was most likely due to gastrointestinal bleed secondary to Eliquis.   2.  Chronic kidney disease.   3.  Multiple other medical problems including hypertension, diabetes mellitus and atrial fibrillation.      PLAN:   1.  I had a long discussion with the patient and her friend.  Bone marrow was reviewed.  I told her it is hypocellular with only 20% cellularity.  There is no evidence of a leukemia, lymphoma or MDS.  Patient has normochromic, normocytic anemia.  I explained to the patient that her anemia is due to multiple factors.  It is due to a combination of hypocellular marrow, chronic kidney disease and anemia due to chronic medical problems.  In August, her hemoglobin was very low at 6.6.  That probably happened because of GI blood loss.  Patient is on Eliquis.  That probably caused the gastrointestinal blood loss. Stool for occult blood was positive.  Gastrointestinal workup has been negative.   2.  Discussed with her regarding treatment.  Labs on 09/08/2017 reveals hemoglobin of 8.1.  Ferritin is low at 6.  I explained to her that she will benefit from intravenous iron.  The patient has taken oral iron. That caused abdominal pain and discomfort.  Intravenous iron will be good in her case.  It will help to improve the ferritin and hopefully it will help to  improve her anemia.  Side effects of IV iron including anaphylactic reactions were discussed.  She will get 2 doses of IV injectafer.   3.  Based on kidney disease, she will be a candidate for erythropoietin stimulating agent like Aranesp and Procrit.  I want to see how her hemoglobin improves with intravenous iron before discussing regarding erythropoietin stimulating agent.   4.  My plan is to see her about a month after the IV iron.  At that time, we will recheck labs including CBC and iron studies.  Patient advised to return sooner if she has worsening weakness, chest pain, shortness of breath, worsening leg swelling, bleeding or any other concerns.      Total time spent: 25 minutes, most of time spent in counseling.         ALEE TIAN MD             D: 2017 15:35   T: 2017 21:28   MT: LIDIA      Name:     PATRICIA BOBO   MRN:      [id number]        Account:      [id number]   :      1935           Visit Date:   2017      Document: [id number]

## 2017-09-14 PROBLEM — T45.4X5A ADVERSE EFFECT OF IRON: Status: ACTIVE | Noted: 2017-09-14

## 2017-09-14 PROBLEM — E61.1 IRON DEFICIENCY: Status: ACTIVE | Noted: 2017-09-14

## 2017-09-15 ENCOUNTER — INFUSION THERAPY VISIT (OUTPATIENT)
Dept: INFUSION THERAPY | Facility: CLINIC | Age: 82
End: 2017-09-15
Attending: INTERNAL MEDICINE
Payer: MEDICARE

## 2017-09-15 VITALS
HEART RATE: 72 BPM | DIASTOLIC BLOOD PRESSURE: 60 MMHG | TEMPERATURE: 98 F | SYSTOLIC BLOOD PRESSURE: 123 MMHG | RESPIRATION RATE: 16 BRPM

## 2017-09-15 DIAGNOSIS — D64.89 ANEMIA DUE TO OTHER CAUSE: ICD-10-CM

## 2017-09-15 DIAGNOSIS — E61.1 IRON DEFICIENCY: Primary | ICD-10-CM

## 2017-09-15 DIAGNOSIS — T45.4X5A ADVERSE EFFECT OF IRON, INITIAL ENCOUNTER: ICD-10-CM

## 2017-09-15 PROCEDURE — 96365 THER/PROPH/DIAG IV INF INIT: CPT

## 2017-09-15 PROCEDURE — 25000128 H RX IP 250 OP 636: Performed by: INTERNAL MEDICINE

## 2017-09-15 RX ADMIN — FERRIC CARBOXYMALTOSE INJECTION 750 MG: 50 INJECTION, SOLUTION INTRAVENOUS at 13:12

## 2017-09-15 RX ADMIN — SODIUM CHLORIDE 250 ML: 9 INJECTION, SOLUTION INTRAVENOUS at 13:10

## 2017-09-15 ASSESSMENT — PAIN SCALES - GENERAL: PAINLEVEL: MODERATE PAIN (5)

## 2017-09-15 NOTE — MR AVS SNAPSHOT
After Visit Summary   9/15/2017    Helene Gibbs    MRN: [id number]           Patient Information     Date Of Birth          1935        Visit Information        Provider Department      9/15/2017 1:00 PM  INFUSION CHAIR 19 [facility]        Today's Diagnoses     Iron deficiency    -  1    Adverse effect of iron, initial encounter        Anemia due to other cause           Follow-ups after your visit        Your next 10 appointments already scheduled     Sep 18, 2017  1:30 PM CDT   LAB with CS LAB   [facility] ([facility]    [address] [address] [phone]           Patient must bring picture ID. Patient should be prepared to give a urine specimen  Please do not eat 10-12 hours before your appointment if you are coming in fasting for labs on lipids, cholesterol, or glucose (sugar). Pregnant women should follow their Care Team instructions. Water with medications is okay. Do not drink coffee or other fluids. If you have concerns about taking  your medications, please ask at office or if scheduling via [employer], send a message by clicking on Secure Messaging, Message Your Care Team.            Sep 22, 2017  2:00 PM CDT   Level 1 with  INFUSION CHAIR 4   [facility] ([facility])    [facility]  [address]  [facility] [address] [phone]            Oct 10, 2017  1:15 PM CDT   Teletrace with STONE TECH1   [facility] PHYSICIANS [facility] [place] ([facility])    [address]  [facility] [address] [phone]            Oct 11, 2017  1:45 PM CDT   Return Visit with  Oncology Nurse   [facility] ([facility])    [facility]  [address]  [facility] [address] [phone]            Oct 17, 2017  2:00 PM CDT   Return Visit  "with Estiven Cali MD   [facility] ([facility])    [facility] [place] [facility]  [address]  [place] [address] [phone]              Who to contact     If you have questions or need follow up information about today's clinic visit or your schedule please contact [facility] directly at [phone].  Normal or non-critical lab and imaging results will be communicated to you by AwayFindhart, letter or phone within 4 business days after the clinic has received the results. If you do not hear from us within 7 days, please contact the clinic through AwayFindhart or phone. If you have a critical or abnormal lab result, we will notify you by phone as soon as possible.  Submit refill requests through [employer] or call your pharmacy and they will forward the refill request to us. Please allow 3 business days for your refill to be completed.          Additional Information About Your Visit        [employer] Information     [employer] lets you send messages to your doctor, view your test results, renew your prescriptions, schedule appointments and more. To sign up, go to [email or web address] . Click on \"Log in\" on the left side of the screen, which will take you to the Welcome page. Then click on \"Sign up Now\" on the right side of the page.     You will be asked to enter the access code listed below, as well as some personal information. Please follow the directions to create your username and password.     Your access code is: [email or web address]  Expires: 10/24/2017  5:07 PM     Your access code will  in 90 days. If you need help or a new code, please call your [place] clinic or [phone].        Care EveryWhere ID     This is your Care EveryWhere ID. This could be used by other organizations to access your [place] medical records  [id number]        Your Vitals Were     Pulse Temperature Respirations Last Period          72 98  F (36.7  C) " (Oral) 16 (LMP Unknown)         Blood Pressure from Last 3 Encounters:   09/15/17 123/60   09/12/17 168/77   09/05/17 129/58    Weight from Last 3 Encounters:   09/12/17 76.4 kg (168 lb 6.4 oz)   09/05/17 75.3 kg (166 lb)   08/29/17 75.3 kg (166 lb)              Today, you had the following     No orders found for display       Primary Care Provider Office Phone # Fax #    Neisha MINAYA MD Vilma [phone] [address]  [place]                [place] [address]        Goals        General    start date: 4/17/14 I will weigh myself daily and if any weight gain or shortness of breath I will call the clinic. (pt-stated)     Notes - Note created  4/17/2014  3:59 PM by Margareth Pichardo RN    As of today's date 4/17/2014 goal is met at 0 - 25%.   Goal Status:  Active        Equal Access to Services     [facility]: Hadii sukhdeep ku hadasho Caitlyn, waaxda luqadaha, qaybta kaalmada adetoriyada, pamela mccullough . So [facility] [phone].    ATENCIÓN: Si habla español, tiene a gupta disposición servicios gratuitos de asistencia lingüística. Llame al [phone].    We comply with applicable federal civil rights laws and [place] laws. We do not discriminate on the basis of race, color, national origin, age, disability sex, sexual orientation or gender identity.            Thank you!     Thank you for choosing [facility]  for your care. Our goal is always to provide you with excellent care. Hearing back from our patients is one way we can continue to improve our services. Please take a few minutes to complete the written survey that you may receive in the mail after your visit with us. Thank you!             Your Updated Medication List - Protect others around you: Learn how to safely use, store and throw away your medicines at www.disposemymeds.org.          This list is accurate as of: 9/15/17  2:11 PM.  Always use your most recent med list.                    Brand Name Dispense Instructions for use Diagnosis    ACCU-CHEK SMARTVIEW test strip   Generic drug:  blood glucose monitoring     100 each    1 strip by In Vitro route 2 times daily Or as directed    Type 2 diabetes mellitus with diabetic nephropathy (H)       albuterol 108 (90 BASE) MCG/ACT Inhaler    PROAIR HFA/PROVENTIL HFA/VENTOLIN HFA    1 Inhaler    Inhale 2 puffs into the lungs every 4 hours as needed for shortness of breath / dyspnea or wheezing    COPD (chronic obstructive pulmonary disease) (H)       apixaban ANTICOAGULANT 2.5 MG tablet    ELIQUIS    60 tablet    Take 1 tablet (2.5 mg) by mouth 2 times daily    Atrial fibrillation, unspecified type (H)       betamethasone valerate 0.1 % cream    VALISONE    15 g    Apply topically 2 times daily Use sparingly    Rash       blood glucose monitoring lancets     1 Box    USE TO TEST BLOOD SUGAR TWO TIMES A DAY OR AS DIRECTED    Type 2 diabetes mellitus with diabetic nephropathy (H)       COMPOUNDED NON-CONTROLLED SUBSTANCE - PHARMACY TO MIX COMPOUNDED MEDICATION    CMPD RX    42 suppository    Vaginal valium- 10 mg suppository    Vaginal pain, Pelvic floor tension, Urinary retention       fluticasone 50 MCG/ACT spray    FLONASE    16 mL    INHALE 1 TO 2 SPRAYS INTO BOTH NOSTRILS DAILY    Rhinitis, unspecified type       furosemide 20 MG tablet    LASIX    180 tablet    TAKE 1-2 TABS BY MOUTH ONCE DAILY. MAY REPEAT AFTER NOON IF NEEDED FOR WEIGHT GAIN/2+ EDEMA    Swelling       gabapentin 300 MG capsule    NEURONTIN    60 capsule    Take 1 capsule (300 mg) by mouth 2 times daily    Neuropathy (H)       GLIPIZIDE PO      Take 5 mg by mouth every morning (before breakfast)        HYDROcodone-acetaminophen 5-325 MG per tablet    NORCO    30 tablet    Take 0.5-1 tablets by mouth every 8 hours as needed for moderate to severe pain maximum 2 tablet(s) per day    Acute pain of left shoulder       hydrocortisone 2.5 % cream    ANUSOL-HC    28 g    Place  rectally 2 times daily as needed for hemorrhoids    External hemorrhoids       hydrOXYzine 25 MG capsule    VISTARIL    180 capsule    Take 1 capsule (25 mg) by mouth 2 times daily as needed for itching    Itching       lisinopril 5 MG tablet    PRINIVIL/ZESTRIL     Take 2 tablets (10 mg) by mouth daily    Essential hypertension       magnesium hydroxide 400 MG/5ML suspension    MILK OF MAGNESIA     Take 30 mLs by mouth At Bedtime        nystatin 792696 UNIT/GM Powd    MYCOSTATIN    60 g    Apply topically 2 times daily as needed    Rash       order for DME     1 each    Equipment being ordered: AccuChek Smart View strips Patient tests twice daily.    Type II or unspecified type diabetes mellitus without mention of complication, not stated as uncontrolled       PRESERVISION/LUTEIN Caps      Take 1 capsule by mouth daily        ranitidine 150 MG tablet    ZANTAC    60 tablet    Take 1 tablet (150 mg) by mouth 2 times daily    Dyspepsia       simvastatin 10 MG tablet    ZOCOR    90 tablet    Take 1 tablet (10 mg) by mouth At Bedtime    Type 2 diabetes mellitus with diabetic nephropathy, without long-term current use of insulin (H)       tiotropium 18 MCG capsule    SPIRIVA HANDIHALER    30 capsule    Inhale 1 capsule (18 mcg) into the lungs daily    Chronic obstructive pulmonary disease, unspecified COPD type (H)       traMADol 50 MG tablet    ULTRAM    100 tablet    Take 1 tablet (50 mg) by mouth every 6 hours as needed for moderate pain    Intractable back pain

## 2017-09-15 NOTE — PROGRESS NOTES
Infusion Nursing Note:  Helene Gibbs presents today for injectafer.    Patient seen by provider today: No   present during visit today: Not Applicable.    Note: Patient denies any new concerns since exam with Dr. Cali on 9/12/17. Has had injectafer in the past and tolerated well.    Intravenous Access:  Peripheral IV placed.    Treatment Conditions:  Not Applicable.      Post Infusion Assessment:  Patient tolerated infusion without incident.  Patient observed for 30 minutes post injectafer per protocol.  Blood return noted pre and post infusion.  Site patent and intact, free from redness, edema or discomfort.  No evidence of extravasations.  Access discontinued per protocol.    Discharge Plan:   Discharge instructions reviewed with: Patient.  Patient and/or family verbalized understanding of discharge instructions and all questions answered.  Copy of AVS reviewed with patient and/or family.  Patient will return 9/22 for next appointment.  Patient discharged in stable condition accompanied by: self.  Departure Mode: Ambulatory with walker.    Gina Page RN

## 2017-09-18 ENCOUNTER — TELEPHONE (OUTPATIENT)
Dept: FAMILY MEDICINE | Facility: CLINIC | Age: 82
End: 2017-09-18

## 2017-09-18 DIAGNOSIS — D64.89 ANEMIA DUE TO OTHER CAUSE: ICD-10-CM

## 2017-09-18 DIAGNOSIS — M25.551 HIP PAIN, RIGHT: Primary | ICD-10-CM

## 2017-09-18 DIAGNOSIS — D50.9 IRON DEFICIENCY ANEMIA, UNSPECIFIED IRON DEFICIENCY ANEMIA TYPE: ICD-10-CM

## 2017-09-18 DIAGNOSIS — Z98.890 HISTORY OF BONE MARROW BIOPSY: ICD-10-CM

## 2017-09-18 LAB — HGB BLD-MCNC: 8.6 G/DL (ref 11.7–15.7)

## 2017-09-18 PROCEDURE — 85018 HEMOGLOBIN: CPT | Performed by: INTERNAL MEDICINE

## 2017-09-18 PROCEDURE — 36415 COLL VENOUS BLD VENIPUNCTURE: CPT | Performed by: INTERNAL MEDICINE

## 2017-09-18 RX ORDER — HYDROCODONE BITARTRATE AND ACETAMINOPHEN 5; 325 MG/1; MG/1
.5-1 TABLET ORAL EVERY 8 HOURS PRN
Qty: 30 TABLET | Refills: 0 | Status: SHIPPED | OUTPATIENT
Start: 2017-09-18 | End: 2017-10-02

## 2017-09-18 NOTE — TELEPHONE ENCOUNTER
Informed the patient with result.  She requested script of norco.  Given to patient   Recheck hemoglobin on Monday or Tuesday.  She was informed in person.  Dr.Nasima Vilma MD

## 2017-09-19 PROBLEM — Z98.890 HISTORY OF BONE MARROW BIOPSY: Status: ACTIVE | Noted: 2017-09-19

## 2017-09-20 ENCOUNTER — TRANSFERRED RECORDS (OUTPATIENT)
Dept: HEALTH INFORMATION MANAGEMENT | Facility: CLINIC | Age: 82
End: 2017-09-20

## 2017-09-22 ENCOUNTER — INFUSION THERAPY VISIT (OUTPATIENT)
Dept: INFUSION THERAPY | Facility: CLINIC | Age: 82
End: 2017-09-22
Attending: INTERNAL MEDICINE
Payer: MEDICARE

## 2017-09-22 VITALS
HEART RATE: 76 BPM | TEMPERATURE: 98.1 F | RESPIRATION RATE: 20 BRPM | OXYGEN SATURATION: 99 % | DIASTOLIC BLOOD PRESSURE: 53 MMHG | SYSTOLIC BLOOD PRESSURE: 118 MMHG

## 2017-09-22 DIAGNOSIS — T45.4X5A ADVERSE EFFECT OF IRON, INITIAL ENCOUNTER: ICD-10-CM

## 2017-09-22 DIAGNOSIS — E61.1 IRON DEFICIENCY: Primary | ICD-10-CM

## 2017-09-22 DIAGNOSIS — D64.89 ANEMIA DUE TO OTHER CAUSE: ICD-10-CM

## 2017-09-22 PROCEDURE — 25000128 H RX IP 250 OP 636: Performed by: INTERNAL MEDICINE

## 2017-09-22 PROCEDURE — 96374 THER/PROPH/DIAG INJ IV PUSH: CPT

## 2017-09-22 RX ADMIN — SODIUM CHLORIDE 250 ML: 9 INJECTION, SOLUTION INTRAVENOUS at 14:16

## 2017-09-22 RX ADMIN — FERRIC CARBOXYMALTOSE INJECTION 750 MG: 50 INJECTION, SOLUTION INTRAVENOUS at 14:16

## 2017-09-22 ASSESSMENT — PAIN SCALES - GENERAL: PAINLEVEL: NO PAIN (0)

## 2017-09-22 NOTE — MR AVS SNAPSHOT
After Visit Summary   9/22/2017    Helene Gibbs    MRN: [id number]           Patient Information     Date Of Birth          1935        Visit Information        Provider Department      9/22/2017 2:00 PM  INFUSION CHAIR 4 [facility]        Today's Diagnoses     Iron deficiency    -  1    Adverse effect of iron, initial encounter        Anemia due to other cause           Follow-ups after your visit        Your next 10 appointments already scheduled     Sep 25, 2017  1:30 PM CDT   LAB with CS LAB   [facility] ([facility])    [address] [address] [phone]           Patient must bring picture ID. Patient should be prepared to give a urine specimen  Please do not eat 10-12 hours before your appointment if you are coming in fasting for labs on lipids, cholesterol, or glucose (sugar). Pregnant women should follow their Care Team instructions. Water with medications is okay. Do not drink coffee or other fluids. If you have concerns about taking  your medications, please ask at office or if scheduling via [employer], send a message by clicking on Secure Messaging, Message Your Care Team.            Oct 10, 2017  1:15 PM CDT   Teletrace with STONE TECH1   [facility] PHYSICIANS HEART AT [place] ([facility])    [address]  [facility] [address] [phone]            Oct 11, 2017  1:45 PM CDT   Return Visit with  Oncology Nurse   [facility] ([facility])    [facility]  [address]  [facility] [address] [phone]            Oct 17, 2017  2:00 PM CDT   Return Visit with Estiven Cali MD   [facility] ([facility])    [facility]  [address]  [facility] [address] [phone]              Who to contact     If you have questions or need follow  "up information about today's clinic visit or your schedule please contact [facility] directly at [phone].  Normal or non-critical lab and imaging results will be communicated to you by Lodge[employer], letter or phone within 4 business days after the clinic has received the results. If you do not hear from us within 7 days, please contact the clinic through Lodge[employer] or phone. If you have a critical or abnormal lab result, we will notify you by phone as soon as possible.  Submit refill requests through [employer] or call your pharmacy and they will forward the refill request to us. Please allow 3 business days for your refill to be completed.          Additional Information About Your Visit        [email or web address] Information     [employer] lets you send messages to your doctor, view your test results, renew your prescriptions, schedule appointments and more. To sign up, go to [email or web address] . Click on \"Log in\" on the left side of the screen, which will take you to the Welcome page. Then click on \"Sign up Now\" on the right side of the page.     You will be asked to enter the access code listed below, as well as some personal information. Please follow the directions to create your username and password.     Your access code is: [email or web address]  Expires: 10/24/2017  5:07 PM     Your access code will  in 90 days. If you need help or a new code, please call your [place] clinic or [phone].        Care EveryWhere ID     This is your Care EveryWhere ID. This could be used by other organizations to access your [place] medical records  [id number]        Your Vitals Were     Pulse Temperature Respirations Last Period Pulse Oximetry       76 98.1  F (36.7  C) (Oral) 20 (LMP Unknown) 99%        Blood Pressure from Last 3 Encounters:   17 118/53   09/15/17 123/60   17 168/77    Weight from Last 3 Encounters:   17 76.4 kg (168 lb 6.4 oz)   17 75.3 kg (166 lb)   17 " 75.3 kg (166 lb)              Today, you had the following     No orders found for display       Primary Care Provider Office Phone # Fax #    Neisha MARIMAR Esparza [phone] [address]  [place]                [place] [address]        Goals        General    start date: 4/17/14 I will weigh myself daily and if any weight gain or shortness of breath I will call the clinic. (pt-stated)     Notes - Note created  4/17/2014  3:59 PM by Margareth Pichardo, RN    As of today's date 4/17/2014 goal is met at 0 - 25%.   Goal Status:  Active        Equal Access to Services     [facility]: Hadii aad ku hadjewell Brady, waaxda luqadaha, qaybta kaalmada lonnieda, pamela mccullough . So [facility] [place] [facility] [phone].    ATENCIÓN: Si habla español, tiene a gupta disposición servicios gratuitos de asistencia lingüística. Llame al [phone].    We comply with applicable federal civil rights laws and [place] laws. We do not discriminate on the basis of race, color, national origin, age, disability sex, sexual orientation or gender identity.            Thank you!     Thank you for choosing [facility] CLINIC AND [facility]  for your care. Our goal is always to provide you with excellent care. Hearing back from our patients is one way we can continue to improve our services. Please take a few minutes to complete the written survey that you may receive in the mail after your visit with us. Thank you!             Your Updated Medication List - Protect others around you: Learn how to safely use, store and throw away your medicines at www.disposemymeds.org.          This list is accurate as of: 9/22/17  2:58 PM.  Always use your most recent med list.                   Brand Name Dispense Instructions for use Diagnosis    ACCU-CHEK SMARTVIEW test strip   Generic drug:  blood glucose monitoring     100 each    1 strip by In Vitro route 2 times daily Or as directed    Type 2 diabetes  mellitus with diabetic nephropathy (H)       albuterol 108 (90 BASE) MCG/ACT Inhaler    PROAIR HFA/PROVENTIL HFA/VENTOLIN HFA    1 Inhaler    Inhale 2 puffs into the lungs every 4 hours as needed for shortness of breath / dyspnea or wheezing    COPD (chronic obstructive pulmonary disease) (H)       apixaban ANTICOAGULANT 2.5 MG tablet    ELIQUIS    60 tablet    Take 1 tablet (2.5 mg) by mouth 2 times daily    Atrial fibrillation, unspecified type (H)       betamethasone valerate 0.1 % cream    VALISONE    15 g    Apply topically 2 times daily Use sparingly    Rash       blood glucose monitoring lancets     1 Box    USE TO TEST BLOOD SUGAR TWO TIMES A DAY OR AS DIRECTED    Type 2 diabetes mellitus with diabetic nephropathy (H)       COMPOUNDED NON-CONTROLLED SUBSTANCE - PHARMACY TO MIX COMPOUNDED MEDICATION    CMPD RX    42 suppository    Vaginal valium- 10 mg suppository    Vaginal pain, Pelvic floor tension, Urinary retention       fluticasone 50 MCG/ACT spray    FLONASE    16 mL    INHALE 1 TO 2 SPRAYS INTO BOTH NOSTRILS DAILY    Rhinitis, unspecified type       furosemide 20 MG tablet    LASIX    180 tablet    TAKE 1-2 TABS BY MOUTH ONCE DAILY. MAY REPEAT AFTER NOON IF NEEDED FOR WEIGHT GAIN/2+ EDEMA    Swelling       gabapentin 300 MG capsule    NEURONTIN    60 capsule    Take 1 capsule (300 mg) by mouth 2 times daily    Neuropathy (H)       GLIPIZIDE PO      Take 5 mg by mouth every morning (before breakfast)        HYDROcodone-acetaminophen 5-325 MG per tablet    NORCO    30 tablet    Take 0.5-1 tablets by mouth every 8 hours as needed for moderate to severe pain maximum 2 tablet(s) per day        hydrocortisone 2.5 % cream    ANUSOL-HC    28 g    Place rectally 2 times daily as needed for hemorrhoids    External hemorrhoids       hydrOXYzine 25 MG capsule    VISTARIL    180 capsule    Take 1 capsule (25 mg) by mouth 2 times daily as needed for itching    Itching       lisinopril 5 MG tablet     PRINIVIL/ZESTRIL     Take 2 tablets (10 mg) by mouth daily    Essential hypertension       magnesium hydroxide 400 MG/5ML suspension    MILK OF MAGNESIA     Take 30 mLs by mouth At Bedtime        nystatin [id number] UNIT/GM Powd    MYCOSTATIN    60 g    Apply topically 2 times daily as needed    Rash       order for DME     1 each    Equipment being ordered: AccuChek Smart View strips Patient tests twice daily.    Type II or unspecified type diabetes mellitus without mention of complication, not stated as uncontrolled       PRESERVISION/LUTEIN Caps      Take 1 capsule by mouth daily        ranitidine 150 MG tablet    ZANTAC    60 tablet    Take 1 tablet (150 mg) by mouth 2 times daily    Dyspepsia       simvastatin 10 MG tablet    ZOCOR    90 tablet    Take 1 tablet (10 mg) by mouth At Bedtime    Type 2 diabetes mellitus with diabetic nephropathy, without long-term current use of insulin (H)       tiotropium 18 MCG capsule    SPIRIVA HANDIHALER    30 capsule    Inhale 1 capsule (18 mcg) into the lungs daily    Chronic obstructive pulmonary disease, unspecified COPD type (H)       traMADol 50 MG tablet    ULTRAM    100 tablet    Take 1 tablet (50 mg) by mouth every 6 hours as needed for moderate pain    Intractable back pain

## 2017-09-22 NOTE — PROGRESS NOTES
Infusion Nursing Note:  Helene Gibbs presents today for Injectafer.    Patient seen by provider today: No   present during visit today: Not Applicable.    Note: N/A.    Intravenous Access:  Peripheral IV placed.    Treatment Conditions:  Not Applicable.      Post Infusion Assessment:  Patient tolerated infusion without incident.  Patient observed for 30 minutes post Injectafer per protocol.  Blood return noted pre and post infusion.  Site patent and intact, free from redness, edema or discomfort.  No evidence of extravasations.  Access discontinued per protocol.    Discharge Plan:   Patient declined prescription refills.  Discharge instructions reviewed with: Patient.  Patient verbalized understanding of discharge instructions and all questions answered.  Copy of AVS reviewed with patient.  Patient will return 9/25/17 for next appointment.  Patient discharged in stable condition accompanied by: self.  Departure Mode: ambulatory with walker.    Tati Feng RN

## 2017-09-25 DIAGNOSIS — D50.9 IRON DEFICIENCY ANEMIA, UNSPECIFIED IRON DEFICIENCY ANEMIA TYPE: ICD-10-CM

## 2017-09-25 LAB — HGB BLD-MCNC: 9.7 G/DL (ref 11.7–15.7)

## 2017-09-25 PROCEDURE — 36415 COLL VENOUS BLD VENIPUNCTURE: CPT | Performed by: INTERNAL MEDICINE

## 2017-09-25 PROCEDURE — 85018 HEMOGLOBIN: CPT | Performed by: INTERNAL MEDICINE

## 2017-09-28 ENCOUNTER — TRANSFERRED RECORDS (OUTPATIENT)
Dept: HEALTH INFORMATION MANAGEMENT | Facility: CLINIC | Age: 82
End: 2017-09-28

## 2017-09-29 ENCOUNTER — TELEPHONE (OUTPATIENT)
Dept: FAMILY MEDICINE | Facility: CLINIC | Age: 82
End: 2017-09-29

## 2017-09-29 ENCOUNTER — OFFICE VISIT (OUTPATIENT)
Dept: FAMILY MEDICINE | Facility: CLINIC | Age: 82
End: 2017-09-29
Payer: MEDICARE

## 2017-09-29 ENCOUNTER — APPOINTMENT (OUTPATIENT)
Dept: CT IMAGING | Facility: CLINIC | Age: 82
End: 2017-09-29
Attending: EMERGENCY MEDICINE
Payer: MEDICARE

## 2017-09-29 ENCOUNTER — HOSPITAL ENCOUNTER (EMERGENCY)
Facility: CLINIC | Age: 82
Discharge: HOME | End: 2017-09-29
Attending: EMERGENCY MEDICINE | Admitting: EMERGENCY MEDICINE
Payer: MEDICARE

## 2017-09-29 VITALS
OXYGEN SATURATION: 100 % | SYSTOLIC BLOOD PRESSURE: 139 MMHG | WEIGHT: 164 LBS | BODY MASS INDEX: 27.32 KG/M2 | DIASTOLIC BLOOD PRESSURE: 64 MMHG | HEART RATE: 85 BPM | TEMPERATURE: 97.7 F | HEIGHT: 65 IN

## 2017-09-29 VITALS
BODY MASS INDEX: 26.36 KG/M2 | SYSTOLIC BLOOD PRESSURE: 133 MMHG | WEIGHT: 164 LBS | OXYGEN SATURATION: 98 % | TEMPERATURE: 97.6 F | DIASTOLIC BLOOD PRESSURE: 57 MMHG | HEIGHT: 66 IN | HEART RATE: 70 BPM | RESPIRATION RATE: 20 BRPM

## 2017-09-29 DIAGNOSIS — B02.9 HERPES ZOSTER WITHOUT COMPLICATION: Primary | ICD-10-CM

## 2017-09-29 DIAGNOSIS — R10.84 ABDOMINAL PAIN, GENERALIZED: ICD-10-CM

## 2017-09-29 DIAGNOSIS — B02.9 HERPES ZOSTER WITHOUT COMPLICATION: ICD-10-CM

## 2017-09-29 LAB
ALBUMIN SERPL-MCNC: 3.6 G/DL (ref 3.4–5)
ALBUMIN UR-MCNC: ABNORMAL MG/DL
ALP SERPL-CCNC: 129 U/L (ref 40–150)
ALT SERPL W P-5'-P-CCNC: 15 U/L (ref 0–50)
ANION GAP SERPL CALCULATED.3IONS-SCNC: 7 MMOL/L (ref 3–14)
APPEARANCE UR: CLEAR
AST SERPL W P-5'-P-CCNC: 9 U/L (ref 0–45)
BASOPHILS # BLD AUTO: 0 10E9/L (ref 0–0.2)
BASOPHILS NFR BLD AUTO: 0.1 %
BILIRUB SERPL-MCNC: 0.6 MG/DL (ref 0.2–1.3)
BILIRUB UR QL STRIP: NEGATIVE
BUN SERPL-MCNC: 17 MG/DL (ref 7–30)
CALCIUM SERPL-MCNC: 10.1 MG/DL (ref 8.5–10.1)
CHLORIDE SERPL-SCNC: 104 MMOL/L (ref 94–109)
CO2 SERPL-SCNC: 26 MMOL/L (ref 20–32)
COLOR UR AUTO: YELLOW
CREAT SERPL-MCNC: 1.36 MG/DL (ref 0.52–1.04)
DIFFERENTIAL METHOD BLD: ABNORMAL
EOSINOPHIL # BLD AUTO: 0.1 10E9/L (ref 0–0.7)
EOSINOPHIL NFR BLD AUTO: 0.8 %
ERYTHROCYTE [DISTWIDTH] IN BLOOD BY AUTOMATED COUNT: 27.4 % (ref 10–15)
GFR SERPL CREATININE-BSD FRML MDRD: 37 ML/MIN/1.7M2
GLUCOSE SERPL-MCNC: 147 MG/DL (ref 70–99)
GLUCOSE UR STRIP-MCNC: NEGATIVE MG/DL
HCT VFR BLD AUTO: 35.4 % (ref 35–47)
HGB BLD-MCNC: 10.9 G/DL (ref 11.7–15.7)
HGB UR QL STRIP: NEGATIVE
HYALINE CASTS #/AREA URNS LPF: NORMAL /LPF
IMM GRANULOCYTES # BLD: 0 10E9/L (ref 0–0.4)
IMM GRANULOCYTES NFR BLD: 0.4 %
INTERPRETATION ECG - MUSE: NORMAL
KETONES UR STRIP-MCNC: NEGATIVE MG/DL
LEUKOCYTE ESTERASE UR QL STRIP: NEGATIVE
LIPASE SERPL-CCNC: 81 U/L (ref 73–393)
LYMPHOCYTES # BLD AUTO: 0.8 10E9/L (ref 0.8–5.3)
LYMPHOCYTES NFR BLD AUTO: 10.9 %
MCH RBC QN AUTO: 26.2 PG (ref 26.5–33)
MCHC RBC AUTO-ENTMCNC: 30.8 G/DL (ref 31.5–36.5)
MCV RBC AUTO: 85 FL (ref 78–100)
MONOCYTES # BLD AUTO: 0.6 10E9/L (ref 0–1.3)
MONOCYTES NFR BLD AUTO: 8.5 %
NEUTROPHILS # BLD AUTO: 6 10E9/L (ref 1.6–8.3)
NEUTROPHILS NFR BLD AUTO: 79.3 %
NITRATE UR QL: NEGATIVE
NRBC # BLD AUTO: 0 10*3/UL
NRBC BLD AUTO-RTO: 0 /100
PH UR STRIP: 7 PH (ref 5–7)
PLATELET # BLD AUTO: 232 10E9/L (ref 150–450)
POTASSIUM SERPL-SCNC: 4.5 MMOL/L (ref 3.4–5.3)
PROT SERPL-MCNC: 7.4 G/DL (ref 6.8–8.8)
RBC # BLD AUTO: 4.16 10E12/L (ref 3.8–5.2)
RBC #/AREA URNS AUTO: NORMAL /HPF
SODIUM SERPL-SCNC: 137 MMOL/L (ref 133–144)
SOURCE: ABNORMAL
SP GR UR STRIP: 1.01 (ref 1–1.03)
UROBILINOGEN UR STRIP-ACNC: 0.2 EU/DL (ref 0.2–1)
WBC # BLD AUTO: 7.6 10E9/L (ref 4–11)
WBC #/AREA URNS AUTO: NORMAL /HPF

## 2017-09-29 PROCEDURE — 83690 ASSAY OF LIPASE: CPT | Performed by: EMERGENCY MEDICINE

## 2017-09-29 PROCEDURE — 99214 OFFICE O/P EST MOD 30 MIN: CPT | Performed by: INTERNAL MEDICINE

## 2017-09-29 PROCEDURE — 96374 THER/PROPH/DIAG INJ IV PUSH: CPT | Mod: 59

## 2017-09-29 PROCEDURE — 96361 HYDRATE IV INFUSION ADD-ON: CPT

## 2017-09-29 PROCEDURE — 74176 CT ABD & PELVIS W/O CONTRAST: CPT

## 2017-09-29 PROCEDURE — 80053 COMPREHEN METABOLIC PANEL: CPT | Performed by: EMERGENCY MEDICINE

## 2017-09-29 PROCEDURE — 96376 TX/PRO/DX INJ SAME DRUG ADON: CPT

## 2017-09-29 PROCEDURE — 81001 URINALYSIS AUTO W/SCOPE: CPT | Performed by: EMERGENCY MEDICINE

## 2017-09-29 PROCEDURE — 25000128 H RX IP 250 OP 636: Performed by: EMERGENCY MEDICINE

## 2017-09-29 PROCEDURE — 25000132 ZZH RX MED GY IP 250 OP 250 PS 637: Mod: GY | Performed by: EMERGENCY MEDICINE

## 2017-09-29 PROCEDURE — 85025 COMPLETE CBC W/AUTO DIFF WBC: CPT | Performed by: EMERGENCY MEDICINE

## 2017-09-29 PROCEDURE — 99285 EMERGENCY DEPT VISIT HI MDM: CPT | Mod: 25

## 2017-09-29 PROCEDURE — A9270 NON-COVERED ITEM OR SERVICE: HCPCS | Mod: GY | Performed by: EMERGENCY MEDICINE

## 2017-09-29 RX ORDER — MORPHINE SULFATE 4 MG/ML
4 INJECTION, SOLUTION INTRAMUSCULAR; INTRAVENOUS
Status: DISCONTINUED | OUTPATIENT
Start: 2017-09-29 | End: 2017-09-29 | Stop reason: HOSPADM

## 2017-09-29 RX ORDER — VALACYCLOVIR HYDROCHLORIDE 1 G/1
1000 TABLET, FILM COATED ORAL ONCE
Status: COMPLETED | OUTPATIENT
Start: 2017-09-29 | End: 2017-09-29

## 2017-09-29 RX ORDER — SODIUM CHLORIDE 9 MG/ML
1000 INJECTION, SOLUTION INTRAVENOUS CONTINUOUS
Status: DISCONTINUED | OUTPATIENT
Start: 2017-09-29 | End: 2017-09-29 | Stop reason: HOSPADM

## 2017-09-29 RX ORDER — ONDANSETRON 2 MG/ML
4 INJECTION INTRAMUSCULAR; INTRAVENOUS EVERY 30 MIN PRN
Status: DISCONTINUED | OUTPATIENT
Start: 2017-09-29 | End: 2017-09-29 | Stop reason: HOSPADM

## 2017-09-29 RX ORDER — VALACYCLOVIR HYDROCHLORIDE 1 G/1
1000 TABLET, FILM COATED ORAL 3 TIMES DAILY
Qty: 21 TABLET | Refills: 0 | Status: SHIPPED | OUTPATIENT
Start: 2017-09-29 | End: 2017-10-16

## 2017-09-29 RX ADMIN — VALACYCLOVIR HYDROCHLORIDE 1000 MG: 1 TABLET, FILM COATED ORAL at 19:52

## 2017-09-29 RX ADMIN — MORPHINE SULFATE 4 MG: 4 INJECTION, SOLUTION INTRAMUSCULAR; INTRAVENOUS at 18:16

## 2017-09-29 RX ADMIN — MORPHINE SULFATE 4 MG: 4 INJECTION, SOLUTION INTRAMUSCULAR; INTRAVENOUS at 16:32

## 2017-09-29 RX ADMIN — SODIUM CHLORIDE 500 ML: 9 INJECTION, SOLUTION INTRAVENOUS at 16:31

## 2017-09-29 ASSESSMENT — ENCOUNTER SYMPTOMS
CONSTIPATION: 1
ABDOMINAL DISTENTION: 1
VOMITING: 0
NAUSEA: 0
ABDOMINAL PAIN: 1

## 2017-09-29 NOTE — ED PROVIDER NOTES
History     Chief Complaint:  Abdominal pain    HPI   Helene Gibbs is a 82 year old female who presents with abdominal pain and a rash. The patient reports that she has been experiencing upper left sided abdominal pain radiating around to her back for the last three days as well as a rash underneath her left breast. She states this rash is painful, and describes this as a sharp pain. She was seen by her primary care physician Dr. Esparza earlier today for her symptoms, who noted her abdomen was distended and recommended she come in for further evaluation here in the ED. While here in the ED the patient also notes she has been constipated recently. She notes that she had a CT scan of her back performed recently showing a pinched nerve.     Allergies:  Penicillins  Ambien  Definity  Sulfa drugs  Cymbalta  Fluconazole     Medications:    Lisinopril  Norco  Gabapentin  Eliquis  Zantac  Glipizide  Lasix  Spiriva  Tramadol  Zocor  Vistaril  Flonase  Albuterol  Milk of magnesia    Past Medical History:    Anemia  Ankle arthritis  Back pain  Bell's palsy  Breast cancer  Colon polyps  COPD  CHF  CAD  Diabetes mellitus  GERD  Hyperlipidemia  Hypertension  Lumbar spinal stenosis  Obesity  Osteoporosis  Other chronic pain  Pacemaker in place  Permament atrial fibrillation  Pleural effusion  Rectal stenosis  Tobacco abuse    Past Surgical History:    Arthroscopy, shoulder. Bilateral  Bone marrow biopsy  C Dexa, bone density, axial skeleton  Mammogram  Colonoscopy  Esophagoscopy, gastroscopy, duodenoscopy, combined  Ablation AV node  Colonoscopy through stoma  Joint replacement, knee, bilateral  Laparoscopic cholecystectomy with cholangiograms  Lumpectomy, breast, left  Phacoemulsification clear cornea with standard intraocular lens implant x2    Family History:    Hypertension  Diabetes  CAD    Social History:  Smoking Status: Yes  Smokeless Tobacco: No  Alcohol Use: NO  Marital Status:       Review of Systems  "  Gastrointestinal: Positive for abdominal distention, abdominal pain and constipation. Negative for nausea and vomiting.   Skin: Positive for rash (Underneath her left breast).   All other systems reviewed and are negative.    Physical Exam   Vitals:  Patient Vitals for the past 24 hrs:   BP Temp Temp src Pulse Resp SpO2 Height Weight   09/29/17 1830 - - - - - 98 % - -   09/29/17 1545 - - - - - 94 % - -   09/29/17 1530 - - - - - 95 % - -   09/29/17 1525 133/57 97.6  F (36.4  C) Oral 70 20 98 % 1.676 m (5' 6\") 74.4 kg (164 lb)     Physical Exam  General: Alert, interactive in mild distress  Head:  Scalp is atraumatic  Eyes:  The pupils are equal, round, and reactive to light    EOM's intact    No scleral icterus  ENT:      Nose:  The external nose is normal  Ears:  External ears are normal  Mouth/Throat: The oropharynx is normal    Mucus membranes are moist      Neck:  Normal range of motion.      There is no rigidity.    Trachea is in the midline         CV:  Regular rate and rhythm    No murmur   Resp:  Breath sounds are clear bilaterally    Non-labored, no retractions or accessory muscle use      GI:  Abdomen is soft, no distension, LUQ tenderness. Hyperactive bowel sounds      MS:  Normal strength in all 4 extremities  Skin:  Warm and dry. Erythematous vesicular rash under left breast.  Neuro: Strength 5/5 x4.  Sensation intact  In all 4 extremities.        Psych:  Awake. Alert.  Normal affect.      Appropriate interactions.    Emergency Department Course     ECG:  ECG taken at 1519, ECG read at 1545  Ventricular-paced rhythm  Abnormal ECG  No significant change from 8/9/17  Rate 81 bpm. KS interval *. QRS duration 140. QT/QTc 430/499. P-R-T axes * -75 97.    Imaging:  Radiology findings were communicated with the patient who voiced understanding of the findings.  Abd/Pelvis CT no contrast - Stone protocol:  1. No cause of acute pain identified in the abdomen or pelvis.  2. No renal or ureteral calculi or " evidence of urinary obstruction.  Per Radiologist.     Laboratory:  Laboratory findings were communicated with the patient who voiced understanding of the findings.  UA: Protein albumin: Trace (A)  CBC: HGB: 10.9 (L) o/w WNL. (WBC 7.6, )   CMP: Glucose: 147 (H), Creat: 136 (H), GFR: 37 (L) o/w WNL  Lipase: 81    Interventions:  1631 Normal Saline 500 mL IV  1816 Morphine, 4 mg, IV     Emergency Department Course:  Nursing notes and vitals reviewed.  1546 I had my initial encounter with the patient.  I performed an exam of the patient as documented above.   EKG obtained in the ED, see results above.   IV was inserted and blood was drawn for laboratory testing, results above.  The patient provided a urine sample here in the emergency department. This was sent for laboratory testing, findings above.    I discussed the treatment plan with the patient. They expressed understanding of this plan and consented to discharge. They will be discharged home with instructions for care and follow up. In addition, the patient will return to the emergency department if their symptoms persist, worsen, if new symptoms arise or if there is any concern.  All questions were answered.    Impression & Plan      Medical Decision Making:  Helene Gibbs is a 82 year old female who presents to the emergency department today with a rash on her left flank consistent with shingles. She also has quite significant abdominal pain. The above workup was undertaken, returning as unremarkable. I have administered a single dose of Valacyclovir here. Of note, the patient is requesting hydrocodone for pain management. Upon review of the prescription monitoring database, the patient has had multiple prescriptions for narcotics and I am concerned with the amount of narcotics she is using. I have stated this to the patient and relayed that I don't see any indication for narcotic pain medication given the chronicity of her use. I have offered her  Lidoderm patches as well as gabapentin. She states she has both of these medications at home and will continue to use them. I offered several stool softeners and the patient states she does not want suppositories and already has miralax and senna. She will follow up with her primary car provider and return if any new symptoms develop. There are no signs of disseminated zoster or more concerning illness.     Diagnosis:    ICD-10-CM    1. Herpes zoster without complication B02.9    2. Chronic pain    Disposition:   Discharge home as noted above.    Discharge Medications:  New Prescriptions    VALACYCLOVIR (VALTREX) 1000 MG TABLET    Take 1 tablet (1,000 mg) by mouth 3 times daily for 7 days     Scribe Disclosure:  I, Dg Garcia, am serving as a scribe at 3:43 PM on 9/29/2017 to document services personally performed by Duran Bishop,*, based on my observations and the provider's statements to me.    9/29/2017    EMERGENCY DEPARTMENT       Duran Bishop MD  09/29/17 1273

## 2017-09-29 NOTE — MR AVS SNAPSHOT
After Visit Summary   9/29/2017    Helene Gibbs    MRN: [id number]           Patient Information     Date Of Birth          1935        Visit Information        Provider Department      9/29/2017 2:00 PM Neisha Esparaz MD [facility]        Today's Diagnoses     Herpes zoster without complication    -  1    Abdominal pain, generalized           Follow-ups after your visit        Your next 10 appointments already scheduled     Oct 10, 2017  1:15 PM CDT   [place] with STONE TECH1   [facility] PHYSICIANS [facility] AT [place] ([facility])    [address]  [place] [facility] [place] [facility] [address]   [phone]            Oct 11, 2017  1:45 PM CDT   Return Visit with  Oncology Nurse   [facility]    [facility]  [address]  [place] [address]   [phone]            Oct 17, 2017  2:00 PM CDT   Return Visit with Estiven Cali MD   [facility] ([facility])    [facility]  [address]  [place] [facility] [place] [facility] [address]   [phone]              Who to contact     If you have questions or need follow up information about today's clinic visit or your schedule please contact [facility] directly at [phone].  Normal or non-critical lab and imaging results will be communicated to you by MyChart, letter or phone within 4 business days after the clinic has received the results. If you do not hear from us within 7 days, please contact the clinic through [employer] or phone. If you have a critical or abnormal lab result, we will notify you by phone as soon as possible.  Submit refill requests through [employer] or call your pharmacy and they will forward the refill request to us. Please allow 3 business days for your refill to be completed.          Additional Information About Your Visit        [employer] Information     [employer] lets you send  "messages to your doctor, view your test results, renew your prescriptions, schedule appointments and more. To sign up, go to [email or web address] . Click on \"Log in\" on the left side of the screen, which will take you to the Welcome page. Then click on \"Sign up Now\" on the right side of the page.     You will be asked to enter the access code listed below, as well as some personal information. Please follow the directions to create your username and password.     Your access code is: [email or web address]  Expires: 10/24/2017  5:07 PM     Your access code will  in 90 days. If you need help or a new code, please call your [facility] clinic or [phone].        Care EveryWhere ID     This is your [facility] EveryWhere ID. This could be used by other organizations to access your [facility] medical records  [id number]        Your Vitals Were     Pulse Temperature Height Last Period Pulse Oximetry BMI (Body Mass Index)    85 97.7  F (36.5  C) (Tympanic) 5' 5\" (1.651 m) (LMP Unknown) 100% 27.29 kg/m2       Blood Pressure from Last 3 Encounters:   17 133/57   17 139/64   17 118/53    Weight from Last 3 Encounters:   17 164 lb (74.4 kg)   17 164 lb (74.4 kg)   17 168 lb 6.4 oz (76.4 kg)              Today, you had the following     No orders found for display       Primary Care Provider Office Phone # Fax #    Neisha Esparza [phone] [address]  [place]                [place] [address]        Goals        General    start date: 14 I will weigh myself daily and if any weight gain or shortness of breath I will call the clinic. (pt-stated)     Notes - Note created  2014  3:59 PM by Margareth Pichardo, RN    As of today's date 2014 goal is met at 0 - 25%.   Goal Status:  Active        Equal Access to Services     [facility] GAR[facility]TT : gilberto Stoneaxda luqadaha, maribell castañeda, pamela nolasco. So " [facility] [phone].    ATENCIÓN: Si peter lloyd, tiene a gupta disposición servicios gratuitos de asistencia lingüística. Maxim hunter [phone].    We comply with applicable federal civil rights laws and [place] laws. We do not discriminate on the basis of race, color, national origin, age, disability, sex, sexual orientation, or gender identity.            Thank you!     Thank you for choosing [facility]  for your care. Our goal is always to provide you with excellent care. Hearing back from our patients is one way we can continue to improve our services. Please take a few minutes to complete the written survey that you may receive in the mail after your visit with us. Thank you!             Your Updated Medication List - Protect others around you: Learn how to safely use, store and throw away your medicines at www.disposemymeds.org.          This list is accurate as of: 9/29/17 11:59 PM.  Always use your most recent med list.                   Brand Name Dispense Instructions for use Diagnosis    ACCU-CHEK SMARTVIEW test strip   Generic drug:  blood glucose monitoring     100 each    1 strip by In Vitro route 2 times daily Or as directed    Type 2 diabetes mellitus with diabetic nephropathy (H)       albuterol 108 (90 BASE) MCG/ACT Inhaler    PROAIR HFA/PROVENTIL HFA/VENTOLIN HFA    1 Inhaler    Inhale 2 puffs into the lungs every 4 hours as needed for shortness of breath / dyspnea or wheezing    COPD (chronic obstructive pulmonary disease) (H)       apixaban ANTICOAGULANT 2.5 MG tablet    ELIQUIS    60 tablet    Take 1 tablet (2.5 mg) by mouth 2 times daily    Atrial fibrillation, unspecified type (H)       betamethasone valerate 0.1 % cream    VALISONE    15 g    Apply topically 2 times daily Use sparingly    Rash       blood glucose monitoring lancets     1 Box    USE TO TEST BLOOD SUGAR TWO TIMES A DAY OR AS DIRECTED    Type 2 diabetes mellitus with diabetic nephropathy (H)       COMPOUNDED  NON-CONTROLLED SUBSTANCE - PHARMACY TO MIX COMPOUNDED MEDICATION    CMPD RX    42 suppository    Vaginal valium- 10 mg suppository    Vaginal pain, Pelvic floor tension, Urinary retention       fluticasone 50 MCG/ACT spray    FLONASE    16 mL    INHALE 1 TO 2 SPRAYS INTO BOTH NOSTRILS DAILY    Rhinitis, unspecified type       furosemide 20 MG tablet    LASIX    180 tablet    TAKE 1-2 TABS BY MOUTH ONCE DAILY. MAY REPEAT AFTER NOON IF NEEDED FOR WEIGHT GAIN/2+ EDEMA    Swelling       gabapentin 300 MG capsule    NEURONTIN    60 capsule    Take 1 capsule (300 mg) by mouth 2 times daily    Neuropathy (H)       GLIPIZIDE PO      Take 5 mg by mouth every morning (before breakfast)        HYDROcodone-acetaminophen 5-325 MG per tablet    NORCO    30 tablet    Take 0.5-1 tablets by mouth every 8 hours as needed for moderate to severe pain maximum 2 tablet(s) per day        hydrocortisone 2.5 % cream    ANUSOL-HC    28 g    Place rectally 2 times daily as needed for hemorrhoids    External hemorrhoids       hydrOXYzine 25 MG capsule    VISTARIL    180 capsule    Take 1 capsule (25 mg) by mouth 2 times daily as needed for itching    Itching       lisinopril 10 MG tablet    PRINIVIL/ZESTRIL    90 tablet    Take 1 tablet (10 mg) by mouth daily    Essential hypertension       magnesium hydroxide 400 MG/5ML suspension    MILK OF MAGNESIA     Take 30 mLs by mouth At Bedtime        nystatin [id number] UNIT/GM Powd    MYCOSTATIN    60 g    Apply topically 2 times daily as needed    Rash       order for DME     1 each    Equipment being ordered: AccuChek Smart View strips Patient tests twice daily.    Type II or unspecified type diabetes mellitus without mention of complication, not stated as uncontrolled       PRESERVISION/LUTEIN Caps      Take 1 capsule by mouth daily        ranitidine 150 MG tablet    ZANTAC    60 tablet    Take 1 tablet (150 mg) by mouth 2 times daily    Dyspepsia       simvastatin 10 MG tablet    ZOCOR    90 tablet     Take 1 tablet (10 mg) by mouth At Bedtime    Type 2 diabetes mellitus with diabetic nephropathy, without long-term current use of insulin (H)       tiotropium 18 MCG capsule    SPIRIVA HANDIHALER    30 capsule    Inhale 1 capsule (18 mcg) into the lungs daily    Chronic obstructive pulmonary disease, unspecified COPD type (H)       traMADol 50 MG tablet    ULTRAM    100 tablet    Take 1 tablet (50 mg) by mouth every 6 hours as needed for moderate pain    Intractable back pain       valACYclovir 1000 mg tablet    VALTREX    21 tablet    Take 1 tablet (1,000 mg) by mouth 3 times daily for 7 days

## 2017-09-29 NOTE — NURSING NOTE
"Chief Complaint   Patient presents with     Back Pain       Initial /64 (BP Location: Left arm, Cuff Size: Adult Regular)  Pulse 85  Temp 97.7  F (36.5  C) (Tympanic)  Ht 5' 5\" (1.651 m)  Wt 164 lb (74.4 kg)  LMP  (LMP Unknown)  SpO2 100%  BMI 27.29 kg/m2 Estimated body mass index is 27.29 kg/(m^2) as calculated from the following:    Height as of this encounter: 5' 5\" (1.651 m).    Weight as of this encounter: 164 lb (74.4 kg).  Medication Reconciliation: complete     Lian Jones MA    "

## 2017-09-29 NOTE — TELEPHONE ENCOUNTER
Please ask Tria to fax XR results.  So we can look at those before appointment   Dr.Nasima Vilma MD

## 2017-09-29 NOTE — ED AVS SNAPSHOT
Emergency Department    [address] [facility] [address]    Phone:  [phone]    Fax:  [phone]                                       Helene Gibbs   MRN: [id number]    Department:   Emergency Department   Date of Visit:  9/29/2017           Patient Information     Date Of Birth          1935        Your diagnoses for this visit were:     Herpes zoster without complication        You were seen by Duran Bishop MD.      Follow-up Information     Schedule an appointment as soon as possible for a visit with Neisha Esparza MD.    Specialty:  Internal Medicine    Contact information:    [address] [facility]  [facility] [id number] [phone]          Follow up with  Emergency Department.    Specialty:  EMERGENCY MEDICINE    Why:  As needed, If symptoms worsen    Contact information:    [address] [facility] [id number] [phone]        Discharge Instructions         Shingles  Shingles is a viral infection caused by the same virus as chicken pox. Anyone who has had chicken pox may get shingles later in life. The virus stays in the body, but remains dormant (asleep). Shingles often occurs in older persons or persons with lowered immunity. But it can affect anyone at any age.  Shingles starts as a tingling patch of skin on one side of the body. Small, painful blisters may then appear. The rash does not spread to the rest of the body.  Exposure to shingles cannot cause shingles. However, it can cause chicken pox in anyone who has not had chicken pox or has not been vaccinated. The contagious period ends when all blisters have crusted over (generally about 2 weeks after the illness begins).  After the blisters heal, the affected skin may be sensitive or painful for months (neuralgia). This often gradually goes away.  A shingles vaccine is available. This can help prevent shingles or make it less painful. It is generally recommended for  adults over the age of 60 who have had chicken pox in the past, but who have never had shingles. Adults over 60 who have had neither chicken pox nor shingles can prevent both diseases with the chicken pox vaccine. Ask your healthcare provider about these vaccines.  Home care    Medicines may be prescribed to help relieve pain. Take these medicines as directed. Ask your healthcare provider or pharmacist before using over-the-counter medicines for helping treat pain and itching.    In certain cases, antiviral medicines may be prescribed to reduce pain, shorten the illness, and prevent neuralgia. Take these medicines as directed.    Compresses made from a solution of cool water mixed with cornstarch or baking soda may help relieve pain and itching.     Gently wash skin daily with soap and water to help prevent infection.  Be certain to rinse off all of the soap, which can be irritating.    Trim fingernails and try not to scratch. Scratching the sores may leave scars.    Stay home from work or school until all blisters have formed a crust and you are no longer contagious.  Follow-up care  Follow up with your healthcare provider or as directed by our staff.  When to seek medical advice    Fever of 100.4 F (38 C) or higher, or as directed by your healthcare provider    Affected skin is on the face or neck and any of the following occur:    Headache    Eye pain    Changes in vision    Sores near the eye    Weakness of facial muscles    Pain, redness, or swelling of a joint    Signs of skin infection: colored drainage from the sores, warmth, increasing redness, or increasing pain  Date Last Reviewed: 9/25/2015 2000-2017 [employer]. [address] [place] [address], [place] [address]. All rights reserved. This information is not intended as a substitute for professional medical care. Always follow your healthcare professional's instructions.      Opioid Medication Information    You have been given a prescription for  an opioid (narcotic) pain medicine and/or have received a pain medicine while here in the Emergency Department. These medicines can make you drowsy or impaired. You must not drive, operate dangerous equipment, or engage in any other dangerous activities while taking these medications. If you drive while taking these medications, you could be arrested for driving under the influence (DUI). Do not drink any alcohol while you are taking these medications.     Opioid pain medications can cause addiction. If you have a history of chemical dependency of any type, you are at a higher risk of becoming addicted to pain medications.  Only take these prescribed medications to treat your pain when all other options have been tried. Take it for as short a time and as few doses as possible. Store your pain pills in a secure place, as they are frequently stolen and provide a dangerous opportunity for children or visitors in your house to start abusing these powerful medications. We will not replace any lost or stolen medicine. If you do not finish your medication, please dispose of the remaining medication as recommended by your pharmacist.     The [place] [employer]ution [employer] has additional information on medication disposal: [email or web address].      Many prescription pain medications contain Tylenol  (acetaminophen), including Vicodin , Tylenol #3 , Norco , Lortab , and Percocet .  You should not take any extra pills of Tylenol  if you are using these prescription medications or you can get very sick.  Do not ever take more than 3000 mg of acetaminophen in any 24 hour period.    All opioids tend to cause constipation. Drink plenty of water and eat foods that have a lot of fiber, such as fruits, vegetables, prune juice, apple juice and high fiber cereal.  Take a laxative if you don t move your bowels at least every other day. Miralax , Milk of Magnesia, Colace , or Senna   can be used to keep you regular.      Future Appointments        Provider Department Dept Phone Center    10/10/2017 1:15 PM STONE [facility] [place] [phone] [facility] PSA CLIN    10/11/2017 1:45 PM Oncology Nurse [facility] [place] [address] [phone] [place] [facility]NN    10/17/2017 2:00 PM Estiven Cali MD [facility] [phone] [facility]      24 Hour Appointment Hotline       To make an appointment at any [facility], call [phone] ([phone]). If you don't have a family doctor or clinic, we will help you find one. [place] clinics are conveniently located to serve the needs of you and your family.             Review of your medicines      START taking        Dose / Directions Last dose taken    valACYclovir 1000 mg tablet   Commonly known as:  VALTREX   Dose:  1000 mg   Quantity:  21 tablet        Take 1 tablet (1,000 mg) by mouth 3 times daily for 7 days   Refills:  0          Our records show that you are taking the medicines listed below. If these are incorrect, please call your family doctor or clinic.        Dose / Directions Last dose taken    ACCU-CHEK SMARTVIEW test strip   Dose:  1 strip   Quantity:  100 each   Generic drug:  blood glucose monitoring        1 strip by In Vitro route 2 times daily Or as directed   Refills:  1        albuterol 108 (90 BASE) MCG/ACT Inhaler   Commonly known as:  PROAIR HFA/PROVENTIL HFA/VENTOLIN HFA   Dose:  2 puff   Quantity:  1 Inhaler        Inhale 2 puffs into the lungs every 4 hours as needed for shortness of breath / dyspnea or wheezing   Refills:  5        apixaban ANTICOAGULANT 2.5 MG tablet   Commonly known as:  ELIQUIS   Dose:  2.5 mg   Quantity:  60 tablet        Take 1 tablet (2.5 mg) by mouth 2 times daily   Refills:  1        betamethasone valerate 0.1 % cream   Commonly known as:  VALISONE   Quantity:  15 g        Apply topically 2 times daily Use sparingly   Refills:  1        blood  glucose monitoring lancets   Quantity:  1 Box        USE TO TEST BLOOD SUGAR TWO TIMES A DAY OR AS DIRECTED   Refills:  11        COMPOUNDED NON-CONTROLLED SUBSTANCE - PHARMACY TO MIX COMPOUNDED MEDICATION   Commonly known as:  CMPD RX   Quantity:  42 suppository        Vaginal valium- 10 mg suppository   Refills:  3        fluticasone 50 MCG/ACT spray   Commonly known as:  FLONASE   Quantity:  16 mL        INHALE 1 TO 2 SPRAYS INTO BOTH NOSTRILS DAILY   Refills:  3        furosemide 20 MG tablet   Commonly known as:  LASIX   Quantity:  180 tablet        TAKE 1-2 TABS BY MOUTH ONCE DAILY. MAY REPEAT AFTER NOON IF NEEDED FOR WEIGHT GAIN/2+ EDEMA   Refills:  1        gabapentin 300 MG capsule   Commonly known as:  NEURONTIN   Dose:  300 mg   Quantity:  60 capsule        Take 1 capsule (300 mg) by mouth 2 times daily   Refills:  3        GLIPIZIDE PO   Dose:  5 mg        Take 5 mg by mouth every morning (before breakfast)   Refills:  0        HYDROcodone-acetaminophen 5-325 MG per tablet   Commonly known as:  NORCO   Dose:  0.5-1 tablet   Quantity:  30 tablet        Take 0.5-1 tablets by mouth every 8 hours as needed for moderate to severe pain maximum 2 tablet(s) per day   Refills:  0        hydrocortisone 2.5 % cream   Commonly known as:  ANUSOL-HC   Quantity:  28 g        Place rectally 2 times daily as needed for hemorrhoids   Refills:  1        hydrOXYzine 25 MG capsule   Commonly known as:  VISTARIL   Dose:  25 mg   Quantity:  180 capsule        Take 1 capsule (25 mg) by mouth 2 times daily as needed for itching   Refills:  1        lisinopril 10 MG tablet   Commonly known as:  PRINIVIL/ZESTRIL   Dose:  10 mg   Quantity:  90 tablet        Take 1 tablet (10 mg) by mouth daily   Refills:  1        magnesium hydroxide 400 MG/5ML suspension   Commonly known as:  MILK OF MAGNESIA   Dose:  30 mL        Take 30 mLs by mouth At Bedtime   Refills:  0        nystatin 793098 UNIT/GM Powd   Commonly known as:  MYCOSTATIN    Quantity:  60 g        Apply topically 2 times daily as needed   Refills:  1        order for DME   Quantity:  1 each        Equipment being ordered: AccuChek Smart View strips Patient tests twice daily.   Refills:  11        PRESERVISION/LUTEIN Caps   Dose:  1 capsule        Take 1 capsule by mouth daily   Refills:  0        ranitidine 150 MG tablet   Commonly known as:  ZANTAC   Dose:  150 mg   Quantity:  60 tablet        Take 1 tablet (150 mg) by mouth 2 times daily   Refills:  1        simvastatin 10 MG tablet   Commonly known as:  ZOCOR   Dose:  10 mg   Quantity:  90 tablet        Take 1 tablet (10 mg) by mouth At Bedtime   Refills:  1        tiotropium 18 MCG capsule   Commonly known as:  SPIRIVA HANDIHALER   Dose:  18 mcg   Quantity:  30 capsule        Inhale 1 capsule (18 mcg) into the lungs daily   Refills:  3        traMADol 50 MG tablet   Commonly known as:  ULTRAM   Dose:  50 mg   Quantity:  100 tablet        Take 1 tablet (50 mg) by mouth every 6 hours as needed for moderate pain   Refills:  1                Prescriptions were sent or printed at these locations (1 Prescription)                   Other Prescriptions                Printed at Department/Unit printer (1 of 1)         valACYclovir (VALTREX) 1000 mg tablet                Procedures and tests performed during your visit     *UA reflex to Microscopic (ED Lab POCT Only 3-11)    Abd/pelvis CT no contrast - Stone Protocol    CBC with platelets differential    Comprehensive metabolic panel    EKG 12-lead, tracing only    Give 20 ounces of water 15 minutes before CT of abdomen    Lipase    Peripheral IV: Standard    Urine Microscopic      Orders Needing Specimen Collection     None      Pending Results     Date and Time Order Name Status Description    9/29/2017 1658 Abd/pelvis CT no contrast - Stone Protocol Preliminary             Pending Culture Results     No orders found from 9/27/2017 to 9/30/2017.            Pending Results Instructions      If you had any lab results that were not finalized at the time of your Discharge, you can call the ED Lab Result RN at [phone]. You will be contacted by this team for any positive Lab results or changes in treatment. The nurses are available 7 days a week from 10A to 6:30P.  You can leave a message 24 hours per day and they will return your call.        Test Results From Your Hospital Stay        9/29/2017  4:35 PM      Component Results     Component Value Ref Range & Units Status    WBC 7.6 4.0 - 11.0 10e9/L Final    RBC Count 4.16 3.8 - 5.2 10e12/L Final    Hemoglobin 10.9 (L) 11.7 - 15.7 g/dL Final    Hematocrit 35.4 35.0 - 47.0 % Final    MCV 85 78 - 100 fl Final    MCH 26.2 (L) 26.5 - 33.0 pg Final    MCHC 30.8 (L) 31.5 - 36.5 g/dL Final    RDW 27.4 (H) 10.0 - 15.0 % Final    Platelet Count 232 150 - 450 10e9/L Final    Diff Method Automated Method  Final    % Neutrophils 79.3 % Final    % Lymphocytes 10.9 % Final    % Monocytes 8.5 % Final    % Eosinophils 0.8 % Final    % Basophils 0.1 % Final    % Immature Granulocytes 0.4 % Final    Nucleated RBCs 0 0 /100 Final    Absolute Neutrophil 6.0 1.6 - 8.3 10e9/L Final    Absolute Lymphocytes 0.8 0.8 - 5.3 10e9/L Final    Absolute Monocytes 0.6 0.0 - 1.3 10e9/L Final    Absolute Eosinophils 0.1 0.0 - 0.7 10e9/L Final    Absolute Basophils 0.0 0.0 - 0.2 10e9/L Final    Abs Immature Granulocytes 0.0 0 - 0.4 10e9/L Final    Absolute Nucleated RBC 0.0  Final         9/29/2017  4:52 PM      Component Results     Component Value Ref Range & Units Status    Sodium 137 133 - 144 mmol/L Final    Potassium 4.5 3.4 - 5.3 mmol/L Final    Chloride 104 94 - 109 mmol/L Final    Carbon Dioxide 26 20 - 32 mmol/L Final    Anion Gap 7 3 - 14 mmol/L Final    Glucose 147 (H) 70 - 99 mg/dL Final    Urea Nitrogen 17 7 - 30 mg/dL Final    Creatinine 1.36 (H) 0.52 - 1.04 mg/dL Final    GFR Estimate 37 (L) >60 mL/min/1.7m2 Final    Non  GFR Calc    GFR Estimate If  Black 45 (L) >60 mL/min/1.7m2 Final    African American GFR Calc    Calcium 10.1 8.5 - 10.1 mg/dL Final    Bilirubin Total 0.6 0.2 - 1.3 mg/dL Final    Albumin 3.6 3.4 - 5.0 g/dL Final    Protein Total 7.4 6.8 - 8.8 g/dL Final    Alkaline Phosphatase 129 40 - 150 U/L Final    ALT 15 0 - 50 U/L Final    AST 9 0 - 45 U/L Final         9/29/2017  4:51 PM      Component Results     Component Value Ref Range & Units Status    Lipase 81 73 - 393 U/L Final         9/29/2017  6:29 PM      Component Results     Component Value Ref Range & Units Status    Color Urine Yellow  Final    Appearance Urine Clear  Final    Glucose Urine Negative NEG^Negative mg/dL Final    Bilirubin Urine Negative NEG^Negative Final    Ketones Urine Negative NEG^Negative mg/dL Final    Specific Gravity Urine 1.015 1.003 - 1.035 Final    Blood Urine Negative NEG^Negative Final    pH Urine 7.0 5.0 - 7.0 pH Final    Protein Albumin Urine Trace (A) NEG^Negative mg/dL Final    Urobilinogen Urine 0.2 0.2 - 1.0 EU/dL Final    Nitrite Urine Negative NEG^Negative Final    Leukocyte Esterase Urine Negative NEG^Negative Final    Source Catheterized Urine  Final         9/29/2017  5:28 PM      Narrative     CT ABDOMEN AND PELVIS WITHOUT CONTRAST   9/29/2017 5:19 PM     HISTORY: Left upper quadrant abdominal pain.    COMPARISON: 10/28/2016    TECHNIQUE: Without intravenous or oral contrast, helical sections were  acquired from the top of the diaphragm through the pubic symphysis.  Coronal reconstructions were generated. Radiation dose for this scan  was reduced using automated exposure control, adjustment of the mA  and/or kV according to the patient's size, or iterative reconstruction  technique. (Renal stone protocol)    FINDINGS:     Right urinary tract: No renal or ureteral calculi. No dilatation of  the intrarenal collecting system or ureter. 2 cm cyst in the inferior  pole of the kidney.    Left urinary tract: No convincing renal or ureteral calculi.  No  dilatation of the intrarenal collecting system or ureter. A few renal  cysts, the largest in the upper pole and measuring 3.8 cm in diameter.    Urinary bladder: No visualized calculi.    Remainder of the abdomen and pelvis: The liver, spleen, pancreas and  adrenal glands are unremarkable to the limits of a noncontrast CT  scan. Prior cholecystectomy. The small and large bowel are normal in  caliber. The appendix is unremarkable. No bowel wall thickening,  pneumatosis or free intraperitoneal gas. The uterus is present. No  enlarged lymph nodes or free fluid in the abdomen or pelvis.  Atherosclerotic calcification in the abdominal aorta.    Scan through the lower chest is significant for a curvilinear opacity  in the left lung base that is again noted and likely represents  rounded atelectasis. Mild cardiomegaly. Cardiac lead in the right  ventricle.        Impression     IMPRESSION:   1. No cause of acute pain identified in the abdomen or pelvis.  2. No renal or ureteral calculi or evidence of urinary obstruction.         9/29/2017  6:29 PM      Component Results     Component Value Ref Range & Units Status    WBC Urine O - 2 OTO2^O - 2 /HPF Final    RBC Urine O - 2 OTO2^O - 2 /HPF Final    Hyaline Casts O - 2 OTO2^O - 2 /LPF Final                Clinical Quality Measure: Blood Pressure Screening     Your blood pressure was checked while you were in the emergency department today. The last reading we obtained was  BP: 133/57 . Please read the guidelines below about what these numbers mean and what you should do about them.  If your systolic blood pressure (the top number) is less than 120 and your diastolic blood pressure (the bottom number) is less than 80, then your blood pressure is normal. There is nothing more that you need to do about it.  If your systolic blood pressure (the top number) is 120-139 or your diastolic blood pressure (the bottom number) is 80-89, your blood pressure may be higher than it  "should be. You should have your blood pressure rechecked within a year by a primary care provider.  If your systolic blood pressure (the top number) is 140 or greater or your diastolic blood pressure (the bottom number) is 90 or greater, you may have high blood pressure. High blood pressure is treatable, but if left untreated over time it can put you at risk for heart attack, stroke, or kidney failure. You should have your blood pressure rechecked by a primary care provider within the next 4 weeks.  If your provider in the emergency department today gave you specific instructions to follow-up with your doctor or provider even sooner than that, you should follow that instruction and not wait for up to 4 weeks for your follow-up visit.        Thank you for choosing [facility]       Thank you for choosing [facility] for your care. Our goal is always to provide you with excellent care. Hearing back from our patients is one way we can continue to improve our services. Please take a few minutes to complete the written survey that you may receive in the mail after you visit with us. Thank you!        [employer] Information     [employer] lets you send messages to your doctor, view your test results, renew your prescriptions, schedule appointments and more. To sign up, go to [email or web address]t . Click on \"Log in\" on the left side of the screen, which will take you to the Welcome page. Then click on \"Sign up Now\" on the right side of the page.     You will be asked to enter the access code listed below, as well as some personal information. Please follow the directions to create your username and password.     Your access code is: [email or web address]  Expires: 10/24/2017  5:07 PM     Your access code will  in 90 days. If you need help or a new code, please call your [facility] clinic or [phone].        Care EveryWhere ID     This is your Care EveryWhere ID. This could be used by other organizations to access your [facility] " medical records  [id number]        Equal Access to Services     TONG TUCKER : Yariel Brady, jefe valentino, pamela craft. So [facility] [phone].    ATENCIÓN: Si habla español, tiene a gupta disposición servicios gratuitos de asistencia lingüística. Llame al [phone].    We comply with applicable federal civil rights laws and [place] laws. We do not discriminate on the basis of race, color, national origin, age, disability, sex, sexual orientation, or gender identity.            After Visit Summary       This is your record. Keep this with you and show to your community pharmacist(s) and doctor(s) at your next visit.

## 2017-09-29 NOTE — TELEPHONE ENCOUNTER
Called [facility] -- requested X-Ray results to be faxed over this AM.   Will be sent to the "[employer]" fax machine. Will monitor for incoming fax.     Ruba Mir RN

## 2017-09-29 NOTE — PROGRESS NOTES
SUBJECTIVE:   Helene Gibbs is a 82 year old female who presents to clinic today for the following health issues:      Back Pain       Duration: 3 days        Specific cause: none    Description:   Location of pain: middle of back both and abdomen left side  Character of pain: sharp  Pain radiation:radiates into the right buttocks  New numbness or weakness in legs, not attributed to pain:  no     Intensity: Currently 8-9/10    History:   Pain interferes with job:   History of back problems: recurrent self limited episodes of low back pain in the past  Any previous MRI or X-rays: Yes- at [facility].  Date unk and Yes--at [facility].  Date 09/28/17  Sees a specialist for back pain:  Saw [facility] 9/28/17  Therapies tried without relief:     Alleviating factors:   Improved by:       Precipitating factors:  Worsened by: Coughing and bowel movement    Functional and Psychosocial Screen ([facility] STar[facility] Back):      Lian Jones MA    Accompanying Signs & Symptoms:  Risk of Fracture:  None  Risk of Cauda Equina:  None  Risk of Infection:  None  Risk of Cancer:  None  Risk of Ankylosing Spondylitis:  Onset at age <35, male, AND morning back stiffness. no       Developed left sided mid back pain that radiates to left leg 3 days ago  Pain is also present in left side of abdomen under her breast  Describes pain under her breast as a shooting pain   She experiences severe pain in abdomen with bowel movement   Her last bowel movement was this morning  The pain is exacerbated with coughing  Has a rash on abdomen, she was unaware of the rash until I pointed it out to her.   She relates she has had a fungal rash in this same location   Associated itching across where the rash is   She was administered shingles vaccination in 2009   Denies nausea or emesis       Problem list and histories reviewed & adjusted, as indicated.  Additional history: as documented    Patient Active Problem List   Diagnosis     Type 2 diabetes mellitus with  diabetic nephropathy (H)     Anemia     CKD (chronic kidney disease) stage 3, GFR 30-59 ml/min     Diabetes with proteinuria     Chronic low back pain     Pleural effusion, left     Adjustment reaction with anxiety and depression     c diff colitis 11-12-12     Pulmonary hypertension (H)     Melanosis of colon     Asymptomatic cholelithiasis     Diplopia     COPD (chronic obstructive pulmonary disease) (H)     Elevated TSH     CHF (congestive heart failure) (H)     Cardiac pacemaker in situ     Neck pain     Intractable back pain     Health Care Home     Mild cognitive impairment SLUMS = 22/ 30  CPT = 5.0/ 5.5 7-2014     Hypertension goal BP (blood pressure) < 140/90     Depression with anxiety     Constipation     Tobacco abuse: [age]y/o on 8-12-14 @ 1ppd=50 pk yr hx      Stasis dermatitis     Thoracic disc herniation     Obesity     Hyperlipidemia     Nausea and vomiting     Cholelithiasis with acute on chronic cholecystitis     Cholecystitis     Slow transit constipation     Gastroesophageal reflux disease without esophagitis     Permanent atrial fibrillation (H)     Essential hypertension     Basal cell carcinoma of skin     Controlled substance agreement signed     Chronic pain syndrome     Tubular adenoma     ACP (advance care planning)     Atrial fibrillation, unspecified type (H)     Gastrointestinal hemorrhage, unspecified gastrointestinal hemorrhage type     Iron deficiency     Adverse effect of iron     History of bone marrow biopsy     Past Surgical History:   Procedure Laterality Date     ARTHROSCOPY SHOULDER RT/LT  1999, 2004    Bilateral, right then left     BONE MARROW BIOPSY, BONE SPECIMEN, NEEDLE/TROCAR N/A 8/29/2017    Procedure: BIOPSY BONE MARROW;  BONE MARROW BIOPSY;  Surgeon: Marino Cohen MD;  Location:  GI     C DEXA, BONE DENSITY, AXIAL SKEL       C MAMMOGRAM, SCREENING  1/2009     COLONOSCOPY N/A 10/7/2016    Procedure: COMBINED COLONOSCOPY, SINGLE OR MULTIPLE  BIOPSY/POLYPECTOMY BY BIOPSY;  Surgeon: Cassandra Mccord MD;  Location:  GI     ESOPHAGOSCOPY, GASTROSCOPY, DUODENOSCOPY (EGD), COMBINED N/A 8/10/2017    Procedure: COMBINED ESOPHAGOSCOPY, GASTROSCOPY, DUODENOSCOPY (EGD), BIOPSY SINGLE OR MULTIPLE;  gastroscopy;  Surgeon: Helene Bustamante MD;  Location:  GI     H ABLATION AV NODE  10/2012     HC COLONOSCOPY THRU STOMA, DIAGNOSTIC  ? 2005     IMPLANT PACEMAKER  10/2012    [place] [id number], [id number]     JOINT REPLACEMTN, KNEE RT/LT      Joint Replacement knee bilateral     LAPAROSCOPIC CHOLECYSTECTOMY WITH CHOLANGIOGRAMS N/A 2015    Procedure: LAPAROSCOPIC CHOLECYSTECTOMY WITH CHOLANGIOGRAMS;  Surgeon: Ervin Amos MD;  Location:  OR     LUMPECTOMY BREAST      Left-     PHACOEMULSIFICATION CLEAR CORNEA WITH STANDARD INTRAOCULAR LENS IMPLANT Left 2015    Procedure: PHACOEMULSIFICATION CLEAR CORNEA WITH STANDARD INTRAOCULAR LENS IMPLANT;  Surgeon: Sai Obregon MD;  Location: [facility]     PHACOEMULSIFICATION CLEAR CORNEA WITH TORIC INTRAOCULAR LENS IMPLANT Right 2017    Procedure: PHACOEMULSIFICATION CLEAR CORNEA WITH TORIC INTRAOCULAR LENS IMPLANT;  RIGHT EYE PHACOEMULSIFICATION CLEAR CORNEA WITH TORIC INTRAOCULAR LENS IMPLANT ;  Surgeon: Duc Richmond MD;  Location: [facility]       Social History   Substance Use Topics     Smoking status: Current Some Day Smoker     Packs/day: 0.25     Years: 45.00     Types: Cigarettes     Smokeless tobacco: Never Used      Comment: 01/25/15 6 or less cigarettes per day, intermittent     Alcohol use No     Family History   Problem Relation Age of Onset     Hypertension Mother      DIABETES Mother       at 83 yrs     C.A.D. Father       age 70s     Breast Cancer No family hx of      Colon Cancer No family hx of          Current Outpatient Prescriptions   Medication Sig Dispense Refill     lisinopril (PRINIVIL/ZESTRIL) 10 MG tablet Take 1 tablet (10 mg) by mouth daily 90 tablet 1      HYDROcodone-acetaminophen (NORCO) 5-325 MG per tablet Take 0.5-1 tablets by mouth every 8 hours as needed for moderate to severe pain maximum 2 tablet(s) per day 30 tablet 0     gabapentin (NEURONTIN) 300 MG capsule Take 1 capsule (300 mg) by mouth 2 times daily 60 capsule 3     apixaban ANTICOAGULANT (ELIQUIS) 2.5 MG tablet Take 1 tablet (2.5 mg) by mouth 2 times daily 60 tablet 1     ranitidine (ZANTAC) 150 MG tablet Take 1 tablet (150 mg) by mouth 2 times daily 60 tablet 1     GLIPIZIDE PO Take 5 mg by mouth every morning (before breakfast)       furosemide (LASIX) 20 MG tablet TAKE 1-2 TABS BY MOUTH ONCE DAILY. MAY REPEAT AFTER NOON IF NEEDED FOR WEIGHT GAIN/2+ EDEMA 180 tablet 1     tiotropium (SPIRIVA HANDIHALER) 18 MCG capsule Inhale 1 capsule (18 mcg) into the lungs daily 30 capsule 3     traMADol (ULTRAM) 50 MG tablet Take 1 tablet (50 mg) by mouth every 6 hours as needed for moderate pain 100 tablet 1     simvastatin (ZOCOR) 10 MG tablet Take 1 tablet (10 mg) by mouth At Bedtime 90 tablet 1     hydrOXYzine (VISTARIL) 25 MG capsule Take 1 capsule (25 mg) by mouth 2 times daily as needed for itching 180 capsule 1     fluticasone (FLONASE) 50 MCG/ACT spray INHALE 1 TO 2 SPRAYS INTO BOTH NOSTRILS DAILY 16 mL 3     COMPOUND (CMPD RX) - PHARMACY TO MIX COMPOUNDED MEDICATION Vaginal valium- 10 mg suppository 42 suppository 3     betamethasone valerate (VALISONE) 0.1 % cream Apply topically 2 times daily Use sparingly 15 g 1     hydrocortisone (ANUSOL-HC) 2.5 % rectal cream Place rectally 2 times daily as needed for hemorrhoids 28 g 1     ACCU-CHEK SMARTVIEW test strip 1 strip by In Vitro route 2 times daily Or as directed 100 each 1     blood glucose monitoring (ACCU-CHEK FASTCLIX) lancets USE TO TEST BLOOD SUGAR TWO TIMES A DAY OR AS DIRECTED 1 Box 11     Multiple Vitamins-Minerals (PRESERVISION/LUTEIN) CAPS Take 1 capsule by mouth daily        nystatin (MYCOSTATIN) 154728 UNIT/GM POWD Apply topically 2 times  "daily as needed 60 g 1     ORDER FOR DME, SET TO LOCAL PRINT, Equipment being ordered: AccuChek Smart View strips  Patient tests twice daily. 1 each 11     albuterol (PROAIR HFA, PROVENTIL HFA, VENTOLIN HFA) 108 (90 BASE) MCG/ACT inhaler Inhale 2 puffs into the lungs every 4 hours as needed for shortness of breath / dyspnea or wheezing 1 Inhaler 5     magnesium hydroxide (MILK OF MAGNESIA) 400 MG/5ML suspension Take 30 mLs by mouth At Bedtime        Allergies   Allergen Reactions     Penicillins Hives     Ambien [Zolpidem Tartrate]      Hallucinations and fell out of bed at night.     Definity      Caused a syncopal episode.     Sulfa Drugs Itching     Cymbalta Rash     Fluconazole Rash         Reviewed and updated as needed this visit by clinical staff  Tobacco  Allergies  Meds  Problems  Med Hx  Surg Hx  Fam Hx  Soc Hx        Reviewed and updated as needed this visit by Provider         ROS:  Constitutional, neuro, ENT, endocrine, pulmonary, cardiac, gastrointestinal, genitourinary, musculoskeletal, integument and psychiatric systems are negative, except as otherwise noted.    This document serves as a record of the services and decisions personally performed and made by Neisha Esparza MD. It was created on her behalf by Laura Caballero, a trained medical scribe. The creation of this document is based on the provider's statements to the medical scribe.  Laura Caballero 2:16 PM September 29, 2017    OBJECTIVE:                                                    /64 (BP Location: Left arm, Cuff Size: Adult Regular)  Pulse 85  Temp 97.7  F (36.5  C) (Tympanic)  Ht 1.651 m (5' 5\")  Wt 74.4 kg (164 lb)  LMP  (LMP Unknown)  SpO2 100%  BMI 27.29 kg/m2  Body mass index is 27.29 kg/(m^2).  GENERAL APPEARANCE: fatigued, mild distress  RESP: lungs clear to auscultation - no rales, rhonchi or wheezes  CV: regular rates and rhythm, normal S1 S2, no S3 or S4 and no murmur, click or rub  ABDOMEN: " distended, tender, without hepatosplenomegaly or masses and bowel sounds normal  MS: extremities normal- no gross deformities noted  SKIN: no suspicious lesions or rashes  PSYCH: mentation appears normal and tearful affect        ASSESSMENT/PLAN:                                                    Helene was seen today for back pain.    Diagnoses and all orders for this visit:    Herpes zoster without complication  Rash on abdomen appears consistent with shingles     Abdominal pain, generalized  Patient has had severe abdominal pain for 3 days  It is exacerbated with cough and bowel movement   I recommended patient be seen in the ED due to severity of pain and her appearance   She may need imaging and would prefer it was done in the ED  I called the ED physician to consult on patient     The information in this document, created by the medical scribe for me, accurately reflects the services I personally performed and the decisions made by me. I have reviewed and approved this document for accuracy prior to leaving the patient care area.  September 29, 2017 2:38 PM    Neisha Esparza MD  [facility]

## 2017-09-29 NOTE — ED AVS SNAPSHOT
Emergency Department    [address] [place] [address]    Phone:  [phone]    Fax:  [phone]                                       Helene Gibbs   MRN: [id number]    Department:   Emergency Department   Date of Visit:  9/29/2017           After Visit Summary Signature Page     I have received my discharge instructions, and my questions have been answered. I have discussed any challenges I see with this plan with the nurse or doctor.    ..........................................................................................................................................  Patient/Patient Representative Signature      ..........................................................................................................................................  Patient Representative Print Name and Relationship to Patient    ..................................................               ................................................  Date                                            Time    ..........................................................................................................................................  Reviewed by Signature/Title    ...................................................              ..............................................  Date                                                            Time

## 2017-09-29 NOTE — TELEPHONE ENCOUNTER
PCP:    Pt called in to report new onset back pain that developed several days ago.   Pain is located in the middle/upper thoracic area. Pain is worse during movement.     The Pt saw her Orthopedist yesterday at [facility] for some back pain that she has been having.   The Pt reports she had an X-Ray done yesterday -- states nothing significant showing.   The Pt has given Dilaudid by Ortho to treat her pain, states it is not helping at all.   Advised her NOT to take her Norco at the same time has the Dilaudid. Pt expressed understanding.     Pt is set to see PCP at 2:00pm today, advised her to discuss it further then.   ER if pain becomes severe.     Ruba Mir RN

## 2017-09-29 NOTE — TELEPHONE ENCOUNTER
Reason for call:  Patient reporting a symptom    Symptom or request: pain on left side around her middle to her back    Duration (how long have symptoms been present): 3 days    Have you been treated for this before? No    Additional comments: was seen by ortho yesterday and told its not her  back    Phone Number patient can be reached at:  Home number on file [phone] (home)    Best Time:  anytime    Can we leave a detailed message on this number:  YES    Call taken on 9/29/2017 at 8:10 AM by Celestina Ellison

## 2017-09-30 NOTE — DISCHARGE INSTRUCTIONS
Shingles  Shingles is a viral infection caused by the same virus as chicken pox. Anyone who has had chicken pox may get shingles later in life. The virus stays in the body, but remains dormant (asleep). Shingles often occurs in older persons or persons with lowered immunity. But it can affect anyone at any age.  Shingles starts as a tingling patch of skin on one side of the body. Small, painful blisters may then appear. The rash does not spread to the rest of the body.  Exposure to shingles cannot cause shingles. However, it can cause chicken pox in anyone who has not had chicken pox or has not been vaccinated. The contagious period ends when all blisters have crusted over (generally about 2 weeks after the illness begins).  After the blisters heal, the affected skin may be sensitive or painful for months (neuralgia). This often gradually goes away.  A shingles vaccine is available. This can help prevent shingles or make it less painful. It is generally recommended for adults over the age of 60 who have had chicken pox in the past, but who have never had shingles. Adults over 60 who have had neither chicken pox nor shingles can prevent both diseases with the chicken pox vaccine. Ask your healthcare provider about these vaccines.  Home care    Medicines may be prescribed to help relieve pain. Take these medicines as directed. Ask your healthcare provider or pharmacist before using over-the-counter medicines for helping treat pain and itching.    In certain cases, antiviral medicines may be prescribed to reduce pain, shorten the illness, and prevent neuralgia. Take these medicines as directed.    Compresses made from a solution of cool water mixed with cornstarch or baking soda may help relieve pain and itching.     Gently wash skin daily with soap and water to help prevent infection.  Be certain to rinse off all of the soap, which can be irritating.    Trim fingernails and try not to scratch. Scratching the sores  may leave scars.    Stay home from work or school until all blisters have formed a crust and you are no longer contagious.  Follow-up care  Follow up with your healthcare provider or as directed by our staff.  When to seek medical advice    Fever of 100.4 F (38 C) or higher, or as directed by your healthcare provider    Affected skin is on the face or neck and any of the following occur:    Headache    Eye pain    Changes in vision    Sores near the eye    Weakness of facial muscles    Pain, redness, or swelling of a joint    Signs of skin infection: colored drainage from the sores, warmth, increasing redness, or increasing pain  Date Last Reviewed: 9/25/2015 2000-2017 [employer]. [address] [place] [address], [place] [address]. All rights reserved. This information is not intended as a substitute for professional medical care. Always follow your healthcare professional's instructions.      Opioid Medication Information    You have been given a prescription for an opioid (narcotic) pain medicine and/or have received a pain medicine while here in the Emergency Department. These medicines can make you drowsy or impaired. You must not drive, operate dangerous equipment, or engage in any other dangerous activities while taking these medications. If you drive while taking these medications, you could be arrested for driving under the influence (DUI). Do not drink any alcohol while you are taking these medications.     Opioid pain medications can cause addiction. If you have a history of chemical dependency of any type, you are at a higher risk of becoming addicted to pain medications.  Only take these prescribed medications to treat your pain when all other options have been tried. Take it for as short a time and as few doses as possible. Store your pain pills in a secure place, as they are frequently stolen and provide a dangerous opportunity for children or visitors in your house to start abusing these  powerful medications. We will not replace any lost or stolen medicine. If you do not finish your medication, please dispose of the remaining medication as recommended by your pharmacist.     The [place] [employer] has additional information on medication disposal: https://www.pca.[facility].[place].us/living-green/managing-unwanted-medications.      Many prescription pain medications contain Tylenol  (acetaminophen), including Vicodin , Tylenol #3 , Norco , Lortab , and Percocet .  You should not take any extra pills of Tylenol  if you are using these prescription medications or you can get very sick.  Do not ever take more than 3000 mg of acetaminophen in any 24 hour period.    All opioids tend to cause constipation. Drink plenty of water and eat foods that have a lot of fiber, such as fruits, vegetables, prune juice, apple juice and high fiber cereal.  Take a laxative if you don t move your bowels at least every other day. Miralax , Milk of Magnesia, Colace , or Senna  can be used to keep you regular.

## 2017-10-01 ENCOUNTER — TRANSFERRED RECORDS (OUTPATIENT)
Dept: HEALTH INFORMATION MANAGEMENT | Facility: CLINIC | Age: 82
End: 2017-10-01

## 2017-10-02 DIAGNOSIS — B02.9 HERPES ZOSTER WITHOUT COMPLICATION: Primary | ICD-10-CM

## 2017-10-02 NOTE — TELEPHONE ENCOUNTER
Reason for Call:  Medication or medication refill:    Do you use a [facility]?  Name of the pharmacy and phone number for the current request:       [facility], [place] - [address]    Name of the medication requested: HYDROcodone-acetaminophen (NORCO) 5-325 MG per tablet    Other request: her HC nurse or Aide will  for her     Can we leave a detailed message on this number? YES    Phone number patient can be reached at: Home number on file [phone] (home)    Best Time: anytime    Call taken on 10/2/2017 at 3:13 PM by Betzaida Fisher

## 2017-10-02 NOTE — TELEPHONE ENCOUNTER
Controlled Substance Refill Request for HYDROcodone-acetaminophen (NORCO) 5-325 MG per tablet  Problem List Complete:  Yes    Last Written Prescription Date:  9/18/2017  Last Fill Quantity: 30,   # refills: 0    Last Office Visit with G primary care provider: 9/29/2017    Clinic visit frequency required: Q 3 months     Future Office visit:   Next 5 appointments (look out 90 days)     Oct 11, 2017  1:45 PM CDT   Return Visit with  Oncology Nurse   [facility]    [facility]  [address]  [facility] [address]   [phone]            Oct 17, 2017  2:00 PM CDT   Return Visit with Estiven Cali MD   [facility]    [facility]  [address]  [facility] [address]   [phone]                  Controlled substance agreement on file: Yes:  Date 10/5/2016.     Processing:  Patient will  in clinic     checked in past 6 months?  No, route to RN

## 2017-10-04 ENCOUNTER — NURSE TRIAGE (OUTPATIENT)
Dept: NURSING | Facility: CLINIC | Age: 82
End: 2017-10-04

## 2017-10-04 PROBLEM — B02.9 HERPES ZOSTER WITHOUT COMPLICATION: Status: ACTIVE | Noted: 2017-10-04

## 2017-10-04 RX ORDER — HYDROCODONE BITARTRATE AND ACETAMINOPHEN 5; 325 MG/1; MG/1
.5-1 TABLET ORAL EVERY 8 HOURS PRN
Qty: 30 TABLET | Refills: 0 | Status: SHIPPED | OUTPATIENT
Start: 2017-10-04 | End: 2017-10-16

## 2017-10-04 NOTE — TELEPHONE ENCOUNTER
----- Message from Rodo Mc sent at 10/4/2017  5:15 PM CDT -----  Reason for call Patient calling regarding low hemoglobin. Please advise.    Phone number to reach patient:  Home number on file [phone] (home)    Best Time:  Anytime.    Can we leave a detailed message on this number?  YES

## 2017-10-04 NOTE — TELEPHONE ENCOUNTER
Has weekly hbg checks at clinic.   Wants appointment for tomorrow.   Currently being treated for shingles.   No triage required. Patient states is asymptomatic, requesting lab appointment.

## 2017-10-04 NOTE — TELEPHONE ENCOUNTER
Script placed at  for patient pickup. Unable to notify patient due to phone lines down  Latesha Caruso CMA

## 2017-10-06 ENCOUNTER — TELEPHONE (OUTPATIENT)
Dept: FAMILY MEDICINE | Facility: CLINIC | Age: 82
End: 2017-10-06

## 2017-10-06 ENCOUNTER — DOCUMENTATION ONLY (OUTPATIENT)
Dept: LAB | Facility: CLINIC | Age: 82
End: 2017-10-06

## 2017-10-06 DIAGNOSIS — D64.9 ANEMIA, UNSPECIFIED TYPE: ICD-10-CM

## 2017-10-06 DIAGNOSIS — D64.9 ANEMIA, UNSPECIFIED TYPE: Primary | ICD-10-CM

## 2017-10-06 LAB — HGB BLD-MCNC: 11.8 G/DL (ref 11.7–15.7)

## 2017-10-06 PROCEDURE — 36415 COLL VENOUS BLD VENIPUNCTURE: CPT | Performed by: INTERNAL MEDICINE

## 2017-10-06 PROCEDURE — 85018 HEMOGLOBIN: CPT | Performed by: INTERNAL MEDICINE

## 2017-10-06 NOTE — PROGRESS NOTES
Please notify patient by calling that hemoglobin is normal now  Find out how she is doing ?  Is she taking her eliquis?  She can get her hemoglobin check again in 4 weeks

## 2017-10-06 NOTE — TELEPHONE ENCOUNTER
Per result note:    Notes Recorded by Neisha Esparza MD on 10/6/2017 at 2:34 PM  Please notify patient by calling that hemoglobin is normal now  Find out how she is doing ?  Is she taking her eliquis?  She can get her hemoglobin check again in 4 weeks    1. The Pt reports she is very pleased that her Hgb has improved so much.     2. The Pt continues to have pain from her recent shingles infection. The Pt was not aware of the Norco script that was signed for her on 10/4 (our phones were down at the time). This was not followed up on. I apologized to the Pt and confirmed that the script is waiting at the  for . She will come in and  script tomorrow during [facility] hours, and have it filled at our pharmacy next door.     3. The Pt is taking her Eliquis.     4. Advised her about Hgb recheck.     Ruba Mir RN

## 2017-10-06 NOTE — PROGRESS NOTES
Patient was in for HGB check today. She is requesting for the results to be called to her before the weekend.     Thank you.

## 2017-10-09 ENCOUNTER — TRANSFERRED RECORDS (OUTPATIENT)
Dept: HEALTH INFORMATION MANAGEMENT | Facility: CLINIC | Age: 82
End: 2017-10-09

## 2017-10-10 ENCOUNTER — ALLIED HEALTH/NURSE VISIT (OUTPATIENT)
Dept: CARDIOLOGY | Facility: CLINIC | Age: 82
End: 2017-10-10
Payer: MEDICARE

## 2017-10-10 DIAGNOSIS — R21 RASH: ICD-10-CM

## 2017-10-10 DIAGNOSIS — Z95.0 CARDIAC PACEMAKER IN SITU: Primary | ICD-10-CM

## 2017-10-10 PROCEDURE — 93293 PM PHONE R-STRIP DEVICE EVAL: CPT | Performed by: INTERNAL MEDICINE

## 2017-10-10 RX ORDER — BETAMETHASONE VALERATE 1.2 MG/G
CREAM TOPICAL
Qty: 15 G | Refills: 1 | Status: ON HOLD | OUTPATIENT
Start: 2017-10-10 | End: 2018-01-18

## 2017-10-10 NOTE — PROGRESS NOTES
~90 day clinic teletrace  Normal pacemaker function.  at time of check. Thirty second strips for presenting, magnet, and post magnet modes.  Six second strips were saved in each mode.  Normal function post magnet. F/u in 3 months. JOSEPH Brooks

## 2017-10-10 NOTE — MR AVS SNAPSHOT
After Visit Summary   10/10/2017    Helene Gibbs    MRN: [id number]           Patient Information     Date Of Birth          1935        Visit Information        Provider Department      10/10/2017 1:15 PM CHASE KILGORE [facility]        Today's Diagnoses     Cardiac pacemaker in situ    -  1       Follow-ups after your visit        Your next 10 appointments already scheduled     Oct 11, 2017  1:45 PM CDT   Return Visit with  Oncology Nurse   [facility] ([facility])    [facility]  [address]  [facility] [facility] [address]   [phone]            Oct 17, 2017  2:00 PM CDT   Return Visit with Estiven Cali MD   [facility] ([facility])    [facility]  [address]  [facility] [address]   [phone]            Jan 23, 2018  1:15 PM CST   Teletrace with CHASE KILGORE   [facility] ([facility])    [address]  [place] [address] [phone]              Who to contact     If you have questions or need follow up information about today's clinic visit or your schedule please contact [facility] directly at [phone].  Normal or non-critical lab and imaging results will be communicated to you by ZenCardhart, letter or phone within 4 business days after the clinic has received the results. If you do not hear from us within 7 days, please contact the clinic through MyChart or phone. If you have a critical or abnormal lab result, we will notify you by phone as soon as possible.  Submit refill requests through Your[employer] or call your pharmacy and they will forward the refill request to us. Please allow 3 business days for your refill to be completed.          Additional Information About Your Visit        MyChart Information     [facility] lets  "you send messages to your doctor, view your test results, renew your prescriptions, schedule appointments and more. To sign up, go to [email or web address] . Click on \"Log in\" on the left side of the screen, which will take you to the Welcome page. Then click on \"Sign up Now\" on the right side of the page.     You will be asked to enter the access code listed below, as well as some personal information. Please follow the directions to create your username and password.     Your access code is: [email or web address]  Expires: 10/24/2017  5:07 PM     Your access code will  in 90 days. If you need help or a new code, please call your [place] clinic or [phone].        Care EveryWhere ID     This is your Care EveryWhere ID. This could be used by other organizations to access your [place] medical records  [id number]        Your Vitals Were     Last Period                   (LMP Unknown)            Blood Pressure from Last 3 Encounters:   17 133/57   17 139/64   17 118/53    Weight from Last 3 Encounters:   17 74.4 kg (164 lb)   17 74.4 kg (164 lb)   17 76.4 kg (168 lb 6.4 oz)              We Performed the Following     PM PHONE R STRIP EVAL UP TO 90 DAYS ([id number])        Primary Care Provider Office Phone # Fax #    Neisha MARIMAR Esparza [phone] [address]  [place]                [place] [address]        Goals        General    start date: 14 I will weigh myself daily and if any weight gain or shortness of breath I will call the clinic. (pt-stated)     Notes - Note created  2014  3:59 PM by Margareth Pichardo RN    As of today's date 2014 goal is met at 0 - 25%.   Goal Status:  Active        Equal Access to Services     [facility] GARRETT AH: Yariel Brady, jefe valentino, pamela craft. So [facility] [phone].    ATENCIÓN: Si habla español, tiene a gupta disposición servicios " bianca de asistencia lingüística. Maxim hunter [phone].    We comply with applicable federal civil rights laws and [place] laws. We do not discriminate on the basis of race, color, national origin, age, disability, sex, sexual orientation, or gender identity.            Thank you!     Thank you for choosing [facility] [place]  for your care. Our goal is always to provide you with excellent care. Hearing back from our patients is one way we can continue to improve our services. Please take a few minutes to complete the written survey that you may receive in the mail after your visit with us. Thank you!             Your Updated Medication List - Protect others around you: Learn how to safely use, store and throw away your medicines at www.disposemymeds.org.          This list is accurate as of: 10/10/17  1:38 PM.  Always use your most recent med list.                   Brand Name Dispense Instructions for use Diagnosis    ACCU-CHEK SMARTVIEW test strip   Generic drug:  blood glucose monitoring     100 each    1 strip by In Vitro route 2 times daily Or as directed    Type 2 diabetes mellitus with diabetic nephropathy (H)       albuterol 108 (90 BASE) MCG/ACT Inhaler    PROAIR HFA/PROVENTIL HFA/VENTOLIN HFA    1 Inhaler    Inhale 2 puffs into the lungs every 4 hours as needed for shortness of breath / dyspnea or wheezing    COPD (chronic obstructive pulmonary disease) (H)       apixaban ANTICOAGULANT 2.5 MG tablet    ELIQUIS    60 tablet    Take 1 tablet (2.5 mg) by mouth 2 times daily    Atrial fibrillation, unspecified type (H)       betamethasone valerate 0.1 % cream    VALISONE    15 g    Apply topically 2 times daily Use sparingly    Rash       blood glucose monitoring lancets     1 Box    USE TO TEST BLOOD SUGAR TWO TIMES A DAY OR AS DIRECTED    Type 2 diabetes mellitus with diabetic nephropathy (H)       COMPOUNDED NON-CONTROLLED SUBSTANCE - PHARMACY TO MIX COMPOUNDED  MEDICATION    CMPD RX    42 suppository    Vaginal valium- 10 mg suppository    Vaginal pain, Pelvic floor tension, Urinary retention       fluticasone 50 MCG/ACT spray    FLONASE    16 mL    INHALE 1 TO 2 SPRAYS INTO BOTH NOSTRILS DAILY    Rhinitis, unspecified type       furosemide 20 MG tablet    LASIX    180 tablet    TAKE 1-2 TABS BY MOUTH ONCE DAILY. MAY REPEAT AFTER NOON IF NEEDED FOR WEIGHT GAIN/2+ EDEMA    Swelling       gabapentin 300 MG capsule    NEURONTIN    60 capsule    Take 1 capsule (300 mg) by mouth 2 times daily    Neuropathy       * GLIPIZIDE PO      Take 5 mg by mouth every morning (before breakfast)        * glipiZIDE 5 MG tablet    GLUCOTROL    135 tablet    TAKE 1 TABLET BY MOUTH EVERY MORNING AND 0.5 TABLET EVERY EVENING    Type 2 diabetes mellitus with diabetic nephropathy, without long-term current use of insulin (H)       HYDROcodone-acetaminophen 5-325 MG per tablet    NORCO    30 tablet    Take 0.5-1 tablets by mouth every 8 hours as needed for moderate to severe pain maximum 2 tablet(s) per day    Herpes zoster without complication       hydrocortisone 2.5 % cream    ANUSOL-HC    28 g    Place rectally 2 times daily as needed for hemorrhoids    External hemorrhoids       hydrOXYzine 25 MG capsule    VISTARIL    180 capsule    Take 1 capsule (25 mg) by mouth 2 times daily as needed for itching    Itching       lisinopril 10 MG tablet    PRINIVIL/ZESTRIL    90 tablet    Take 1 tablet (10 mg) by mouth daily    Essential hypertension       magnesium hydroxide 400 MG/5ML suspension    MILK OF MAGNESIA     Take 30 mLs by mouth At Bedtime        nystatin 988333 UNIT/GM Powd    MYCOSTATIN    60 g    Apply topically 2 times daily as needed    Rash       order for DME     1 each    Equipment being ordered: AccuChek Smart View strips Patient tests twice daily.    Type II or unspecified type diabetes mellitus without mention of complication, not stated as uncontrolled       PRESERVISION/LUTEIN  Caps      Take 1 capsule by mouth daily        ranitidine 150 MG tablet    ZANTAC    60 tablet    Take 1 tablet (150 mg) by mouth 2 times daily    Dyspepsia       simvastatin 10 MG tablet    ZOCOR    90 tablet    Take 1 tablet (10 mg) by mouth At Bedtime    Type 2 diabetes mellitus with diabetic nephropathy, without long-term current use of insulin (H)       tiotropium 18 MCG capsule    SPIRIVA HANDIHALER    30 capsule    Inhale 1 capsule (18 mcg) into the lungs daily    Chronic obstructive pulmonary disease, unspecified COPD type (H)       traMADol 50 MG tablet    ULTRAM    100 tablet    Take 1 tablet (50 mg) by mouth every 6 hours as needed for moderate pain    Intractable back pain       valACYclovir 1000 mg tablet    VALTREX    21 tablet    Take 1 tablet (1,000 mg) by mouth 3 times daily for 7 days        * Notice:  This list has 2 medication(s) that are the same as other medications prescribed for you. Read the directions carefully, and ask your doctor or other care provider to review them with you.

## 2017-10-10 NOTE — TELEPHONE ENCOUNTER
Routing refill request to provider for review/approval because:  Drug not on the [facility] refill protocol   Qiana LOUIS RN    Pending Prescriptions:                       Disp   Refills    betamethasone valerate (VALISONE) 0.1 % cr*15 g   1        Sig: APPLY TOPICALLY 2 TIMES DAILY USE SPARINGLY        Last Written Prescription Date:  9/27/2016  Last Fill Quantity: 15g,   # refills: 1  Last Office Visit with [facility], [facility] or  [employer] prescribing provider: 9/29/2017  Future Office visit:      Routing refill request to provider for review/approval because:  Drug not on the [facility], [facility] or  [employer] refill protocol or controlled substance

## 2017-10-11 ENCOUNTER — HOSPITAL ENCOUNTER (OUTPATIENT)
Facility: CLINIC | Age: 82
Setting detail: SPECIMEN
Discharge: HOME | End: 2017-10-11
Attending: INTERNAL MEDICINE | Admitting: INTERNAL MEDICINE
Payer: MEDICARE

## 2017-10-11 ENCOUNTER — ONCOLOGY VISIT (OUTPATIENT)
Dept: ONCOLOGY | Facility: CLINIC | Age: 82
End: 2017-10-11
Attending: INTERNAL MEDICINE
Payer: MEDICARE

## 2017-10-11 DIAGNOSIS — E61.1 IRON DEFICIENCY: ICD-10-CM

## 2017-10-11 DIAGNOSIS — D64.9 ANEMIA, UNSPECIFIED TYPE: ICD-10-CM

## 2017-10-11 LAB
ERYTHROCYTE [DISTWIDTH] IN BLOOD BY AUTOMATED COUNT: 29.2 % (ref 10–15)
FERRITIN SERPL-MCNC: 164 NG/ML (ref 8–252)
HCT VFR BLD AUTO: 40.5 % (ref 35–47)
HGB BLD-MCNC: 12.5 G/DL (ref 11.7–15.7)
IRON SATN MFR SERPL: 19 % (ref 15–46)
IRON SERPL-MCNC: 56 UG/DL (ref 35–180)
MCH RBC QN AUTO: 28 PG (ref 26.5–33)
MCHC RBC AUTO-ENTMCNC: 30.9 G/DL (ref 31.5–36.5)
MCV RBC AUTO: 91 FL (ref 78–100)
PLATELET # BLD AUTO: 200 10E9/L (ref 150–450)
RBC # BLD AUTO: 4.47 10E12/L (ref 3.8–5.2)
RETICS # AUTO: 102.8 10E9/L (ref 25–95)
RETICS/RBC NFR AUTO: 2.3 % (ref 0.5–2)
TIBC SERPL-MCNC: 296 UG/DL (ref 240–430)
WBC # BLD AUTO: 6.2 10E9/L (ref 4–11)

## 2017-10-11 PROCEDURE — 83540 ASSAY OF IRON: CPT | Performed by: INTERNAL MEDICINE

## 2017-10-11 PROCEDURE — 36415 COLL VENOUS BLD VENIPUNCTURE: CPT

## 2017-10-11 PROCEDURE — 83550 IRON BINDING TEST: CPT | Performed by: INTERNAL MEDICINE

## 2017-10-11 PROCEDURE — 85045 AUTOMATED RETICULOCYTE COUNT: CPT | Performed by: INTERNAL MEDICINE

## 2017-10-11 PROCEDURE — 82728 ASSAY OF FERRITIN: CPT | Performed by: INTERNAL MEDICINE

## 2017-10-11 PROCEDURE — 85027 COMPLETE CBC AUTOMATED: CPT | Performed by: INTERNAL MEDICINE

## 2017-10-11 NOTE — PROGRESS NOTES
Medical Assistant Note:  Helene Gibbs presents today for labs.    Patient seen by provider today: No.   present during visit today: Not Applicable.    Concerns: Pt has shingles.    Procedure:  Lab draw site: RAC, Needle type: BF, Gauge: 23.    Post Assessment:  Labs drawn without difficulty: Yes.    Discharge Plan:  Pt has shingles and thinking that it is not contagious. She tolerated procedure very well and complimented technique. Gauze and coban applied.    Face to Face Time: 10min.    Keila Jacobsen MA

## 2017-10-11 NOTE — MR AVS SNAPSHOT
"              After Visit Summary   10/11/2017    Helene Gibbs    MRN: [id number]           Patient Information     Date Of Birth          1935        Visit Information        Provider Department      10/11/2017 1:45 PM Nurse, Anna Marie Oncology [facility]        Today's Diagnoses     Anemia, unspecified type        Iron deficiency           Follow-ups after your visit        Your next 10 appointments already scheduled     Oct 17, 2017  2:00 PM CDT   Return Visit with Estiven Cali MD   [facility])    [facility]  [address]  [facility] [address] [phone]            Jan 23, 2018  1:15 PM CST   Teletrace with STONE TECH1   [facility] [place] ([facility])    [address]  [place] [id number] [phone]              Who to contact     If you have questions or need follow up information about today's clinic visit or your schedule please contact [facility] directly at [phone].  Normal or non-critical lab and imaging results will be communicated to you by Cyzonehart, letter or phone within 4 business days after the clinic has received the results. If you do not hear from us within 7 days, please contact the clinic through Cyzonehart or phone. If you have a critical or abnormal lab result, we will notify you by phone as soon as possible.  Submit refill requests through [employer] or call your pharmacy and they will forward the refill request to us. Please allow 3 business days for your refill to be completed.          Additional Information About Your Visit        Cyzonehart Information     [employer] lets you send messages to your doctor, view your test results, renew your prescriptions, schedule appointments and more. To sign up, go to [email or web address] . Click on \"Log in\" on the left side of the screen, which will take you to the Welcome page. Then " "click on \"Sign up Now\" on the right side of the page.     You will be asked to enter the access code listed below, as well as some personal information. Please follow the directions to create your username and password.     Your access code is: [email or web address]  Expires: 10/24/2017  5:07 PM     Your access code will  in 90 days. If you need help or a new code, please call your [place] clinic or [phone].        Care EveryWhere ID     This is your Care EveryWhere ID. This could be used by other organizations to access your [place] medical records  [id number]        Your Vitals Were     Last Period                   (LMP Unknown)            Blood Pressure from Last 3 Encounters:   17 133/57   17 139/64   17 118/53    Weight from Last 3 Encounters:   17 74.4 kg (164 lb)   17 74.4 kg (164 lb)   17 76.4 kg (168 lb 6.4 oz)              We Performed the Following     CBC with platelets     Ferritin     Iron and iron binding capacity     Reticulocyte count        Primary Care Provider Office Phone # Fax #    Neisha MARIMAR Esparza [phone] [address] [address]  [facility] [address]        Goals        General    start date: 14 I will weigh myself daily and if any weight gain or shortness of breath I will call the clinic. (pt-stated)     Notes - Note created  2014  3:59 PM by Margareth Pichardo, RN    As of today's date 2014 goal is met at 0 - 25%.   Goal Status:  Active        Equal Access to Services     [facility] : Hadii sukhdeep Brady, jefe valentino, pamela craft. So [facility] [phone].    ATENCIÓN: Si habla español, tiene a gupta disposición servicios gratuitos de asistencia lingüística. Llame al [phone].    We comply with applicable federal civil rights laws and [place] laws. We do not discriminate on the basis of race, color, national origin, age, " disability, sex, sexual orientation, or gender identity.            Thank you!     Thank you for choosing [facility]  for your care. Our goal is always to provide you with excellent care. Hearing back from our patients is one way we can continue to improve our services. Please take a few minutes to complete the written survey that you may receive in the mail after your visit with us. Thank you!             Your Updated Medication List - Protect others around you: Learn how to safely use, store and throw away your medicines at www.disposemymeds.org.          This list is accurate as of: 10/11/17  2:26 PM.  Always use your most recent med list.                   Brand Name Dispense Instructions for use Diagnosis    ACCU-CHEK SMARTVIEW test strip   Generic drug:  blood glucose monitoring     100 each    1 strip by In Vitro route 2 times daily Or as directed    Type 2 diabetes mellitus with diabetic nephropathy (H)       albuterol 108 (90 BASE) MCG/ACT Inhaler    PROAIR HFA/PROVENTIL HFA/VENTOLIN HFA    1 Inhaler    Inhale 2 puffs into the lungs every 4 hours as needed for shortness of breath / dyspnea or wheezing    COPD (chronic obstructive pulmonary disease) (H)       apixaban ANTICOAGULANT 2.5 MG tablet    ELIQUIS    60 tablet    Take 1 tablet (2.5 mg) by mouth 2 times daily    Atrial fibrillation, unspecified type (H)       betamethasone valerate 0.1 % cream    VALISONE    15 g    APPLY TOPICALLY 2 TIMES DAILY USE SPARINGLY    Rash       blood glucose monitoring lancets     1 Box    USE TO TEST BLOOD SUGAR TWO TIMES A DAY OR AS DIRECTED    Type 2 diabetes mellitus with diabetic nephropathy (H)       COMPOUNDED NON-CONTROLLED SUBSTANCE - PHARMACY TO MIX COMPOUNDED MEDICATION    CMPD RX    42 suppository    Vaginal valium- 10 mg suppository    Vaginal pain, Pelvic floor tension, Urinary retention       fluticasone 50 MCG/ACT spray    FLONASE    16 mL    INHALE 1 TO 2 SPRAYS INTO BOTH NOSTRILS DAILY     Rhinitis, unspecified type       furosemide 20 MG tablet    LASIX    180 tablet    TAKE 1-2 TABS BY MOUTH ONCE DAILY. MAY REPEAT AFTER NOON IF NEEDED FOR WEIGHT GAIN/2+ EDEMA    Swelling       gabapentin 300 MG capsule    NEURONTIN    60 capsule    Take 1 capsule (300 mg) by mouth 2 times daily    Neuropathy       * GLIPIZIDE PO      Take 5 mg by mouth every morning (before breakfast)        * glipiZIDE 5 MG tablet    GLUCOTROL    135 tablet    TAKE 1 TABLET BY MOUTH EVERY MORNING AND 0.5 TABLET EVERY EVENING    Type 2 diabetes mellitus with diabetic nephropathy, without long-term current use of insulin (H)       HYDROcodone-acetaminophen 5-325 MG per tablet    NORCO    30 tablet    Take 0.5-1 tablets by mouth every 8 hours as needed for moderate to severe pain maximum 2 tablet(s) per day    Herpes zoster without complication       hydrocortisone 2.5 % cream    ANUSOL-HC    28 g    Place rectally 2 times daily as needed for hemorrhoids    External hemorrhoids       hydrOXYzine 25 MG capsule    VISTARIL    180 capsule    Take 1 capsule (25 mg) by mouth 2 times daily as needed for itching    Itching       lisinopril 10 MG tablet    PRINIVIL/ZESTRIL    90 tablet    Take 1 tablet (10 mg) by mouth daily    Essential hypertension       magnesium hydroxide 400 MG/5ML suspension    MILK OF MAGNESIA     Take 30 mLs by mouth At Bedtime        nystatin 618199 UNIT/GM Powd    MYCOSTATIN    60 g    Apply topically 2 times daily as needed    Rash       order for DME     1 each    Equipment being ordered: AccuChek Smart View strips Patient tests twice daily.    Type II or unspecified type diabetes mellitus without mention of complication, not stated as uncontrolled       PRESERVISION/LUTEIN Caps      Take 1 capsule by mouth daily        ranitidine 150 MG tablet    ZANTAC    60 tablet    Take 1 tablet (150 mg) by mouth 2 times daily    Dyspepsia       simvastatin 10 MG tablet    ZOCOR    90 tablet    Take 1 tablet (10 mg) by mouth  At Bedtime    Type 2 diabetes mellitus with diabetic nephropathy, without long-term current use of insulin (H)       tiotropium 18 MCG capsule    SPIRIVA HANDIHALER    30 capsule    Inhale 1 capsule (18 mcg) into the lungs daily    Chronic obstructive pulmonary disease, unspecified COPD type (H)       traMADol 50 MG tablet    ULTRAM    100 tablet    Take 1 tablet (50 mg) by mouth every 6 hours as needed for moderate pain    Intractable back pain       valACYclovir 1000 mg tablet    VALTREX    21 tablet    Take 1 tablet (1,000 mg) by mouth 3 times daily for 7 days        * Notice:  This list has 2 medication(s) that are the same as other medications prescribed for you. Read the directions carefully, and ask your doctor or other care provider to review them with you.

## 2017-10-13 ENCOUNTER — TELEPHONE (OUTPATIENT)
Dept: FAMILY MEDICINE | Facility: CLINIC | Age: 82
End: 2017-10-13

## 2017-10-13 DIAGNOSIS — B02.9 HERPES ZOSTER WITHOUT COMPLICATION: ICD-10-CM

## 2017-10-13 NOTE — TELEPHONE ENCOUNTER
Reason for call:  Patient reporting a symptom    Symptom or request: Shingles pain     Duration (how long have symptoms been present): 2 weeks     Have you been treated for this before? Yes    Additional comments: Pt states her pain is getting worse wants to know how long it will last     Phone Number patient can be reached at:  Home number on file [phone] (home)    Best Time:  Anytime     Can we leave a detailed message on this number:  YES    Call taken on 10/13/2017 at 3:21 PM by Elena Dudley

## 2017-10-13 NOTE — TELEPHONE ENCOUNTER
"Pt reports ongoing and worsening pain across spine, lower torso, leg joints.  Discussed at length timeline for neuropathy pain, may take several weeks to much longer to improve, unfortunately all pts are different.    Pt saw [facility] on Monday and they gave pt rx for gabapentin 300mg am, 600mg PM with plan that if pt tolerates they will give her rx on Monday for 600mg bid.  Pt tolerating gabapentin, but not noticing improvement in sx. Also clarifying, wants [facility] to manage, but [facility] gave rx as she was there.  Pt is taking about 2 tabs of norco a day(has 12 left on hand).  \" A whole pill helps a little bit, doesn't change things significantly.\"    Please advise if any changes to plan now.  Otherwise pt will plan to continue titration with [facility].  Should pt have a follow up in the next couple of weeks to review?  If next week, pt could get norco rx at same time?    Please advise,  Brandi Hinojosa RN        "

## 2017-10-13 NOTE — TELEPHONE ENCOUNTER
Triage ,  Please speak to this patient and get more information  She is on Norco.  Shingle pain sometimes takes so long to get better.  Some patients ends up with postherpetic neuralgi  Dr.Nasima Vilma MD

## 2017-10-13 NOTE — TELEPHONE ENCOUNTER
Agree with gabapentin which help postherpetic neuralgia.  Follow up next week and I can write norco script for her.  She can let us know if she runs out before the appointment   Dr.Nasima Vilma MD

## 2017-10-13 NOTE — TELEPHONE ENCOUNTER
Please see below and advise what I should tell patient and I will call her back.  Thanks  Lian Jones MA

## 2017-10-16 ENCOUNTER — OFFICE VISIT (OUTPATIENT)
Dept: FAMILY MEDICINE | Facility: CLINIC | Age: 82
End: 2017-10-16
Payer: MEDICARE

## 2017-10-16 VITALS
WEIGHT: 162.1 LBS | HEART RATE: 69 BPM | DIASTOLIC BLOOD PRESSURE: 75 MMHG | OXYGEN SATURATION: 97 % | SYSTOLIC BLOOD PRESSURE: 142 MMHG | HEIGHT: 66 IN | BODY MASS INDEX: 26.05 KG/M2 | TEMPERATURE: 97.4 F

## 2017-10-16 DIAGNOSIS — Z23 NEED FOR PROPHYLACTIC VACCINATION AND INOCULATION AGAINST INFLUENZA: ICD-10-CM

## 2017-10-16 DIAGNOSIS — I50.32 CHRONIC DIASTOLIC CONGESTIVE HEART FAILURE (H): ICD-10-CM

## 2017-10-16 DIAGNOSIS — G89.29 OTHER CHRONIC PAIN: ICD-10-CM

## 2017-10-16 DIAGNOSIS — B02.9 HERPES ZOSTER WITHOUT COMPLICATION: Primary | ICD-10-CM

## 2017-10-16 DIAGNOSIS — E11.21 TYPE 2 DIABETES MELLITUS WITH DIABETIC NEPHROPATHY, WITHOUT LONG-TERM CURRENT USE OF INSULIN (H): ICD-10-CM

## 2017-10-16 DIAGNOSIS — R11.0 NAUSEA: ICD-10-CM

## 2017-10-16 DIAGNOSIS — I48.91 ATRIAL FIBRILLATION, UNSPECIFIED TYPE (H): ICD-10-CM

## 2017-10-16 PROCEDURE — 99214 OFFICE O/P EST MOD 30 MIN: CPT | Mod: 25 | Performed by: INTERNAL MEDICINE

## 2017-10-16 PROCEDURE — 90662 IIV NO PRSV INCREASED AG IM: CPT | Performed by: INTERNAL MEDICINE

## 2017-10-16 PROCEDURE — G0008 ADMIN INFLUENZA VIRUS VAC: HCPCS | Performed by: INTERNAL MEDICINE

## 2017-10-16 RX ORDER — HYDROCODONE BITARTRATE AND ACETAMINOPHEN 5; 325 MG/1; MG/1
.5-1 TABLET ORAL 2 TIMES DAILY PRN
Qty: 60 TABLET | Refills: 0 | Status: SHIPPED | OUTPATIENT
Start: 2017-10-16 | End: 2017-10-16

## 2017-10-16 RX ORDER — HYDROCODONE BITARTRATE AND ACETAMINOPHEN 5; 325 MG/1; MG/1
.5-1 TABLET ORAL 3 TIMES DAILY PRN
Qty: 60 TABLET | Refills: 0
Start: 2017-10-16 | End: 2017-11-01

## 2017-10-16 RX ORDER — ONDANSETRON 4 MG/1
4 TABLET, ORALLY DISINTEGRATING ORAL EVERY 6 HOURS PRN
Qty: 30 TABLET | Refills: 1 | Status: ON HOLD | OUTPATIENT
Start: 2017-10-16 | End: 2019-01-01

## 2017-10-16 NOTE — PROGRESS NOTES
SUBJECTIVE:   Helene Gibbs is a 82 year old female who presents to clinic today for the following health issues:    Follow-up shingles  Diagnosed in clinic 9/29/2017 and then seen later that day in ED   Pain has consistently become worse over last few weeks  The pain radiates up her spine and around to her abdomen  Her abdominal pain is exacerbated with BM  Shingles rash is now resolved  She reports she has been taking Norco and Gabapentin as prescribed    Constipation  She is currently taking Miralax bid and magnesium hydroxide  She had four BMs this morning  Not using Senna    Hx of anemia  She is followed by Dr. Cali of Hematology and has appointment tomorrow.    Occasional nausea  She has a old prescription for Zofran and has been using prn for nausea  Requesting refill    Chronic pain  Seen by ARJUN Merchant at [facility] for back pain, brings in records with her today  She reports they are holding on steroid injection and increased her gabapentin      Problem list and histories reviewed & adjusted, as indicated.  Additional history: as documented    Patient Active Problem List   Diagnosis     Type 2 diabetes mellitus with diabetic nephropathy (H)     Anemia     CKD (chronic kidney disease) stage 3, GFR 30-59 ml/min     Diabetes with proteinuria     Chronic low back pain     Pleural effusion, left     Adjustment reaction with anxiety and depression     c diff colitis 11-12-12     Pulmonary hypertension     Melanosis of colon     Asymptomatic cholelithiasis     Diplopia     COPD (chronic obstructive pulmonary disease) (H)     Elevated TSH     CHF (congestive heart failure) (H)     Cardiac pacemaker in situ     Neck pain     Intractable back pain     Health Care Home     Mild cognitive impairment SLUMS = 22/ 30  CPT = 5.0/ 5.5 7-2014     Hypertension goal BP (blood pressure) < 140/90     Depression with anxiety     Constipation     Tobacco abuse: [age]y/o on 8-12-14 @ 1ppd=50 pk yr hx      Stasis  dermatitis     Thoracic disc herniation     Obesity     Hyperlipidemia     Nausea and vomiting     Cholelithiasis with acute on chronic cholecystitis     Cholecystitis     Slow transit constipation     Gastroesophageal reflux disease without esophagitis     Permanent atrial fibrillation (H)     Essential hypertension     Basal cell carcinoma of skin     Controlled substance agreement signed     Chronic pain syndrome     Tubular adenoma     ACP (advance care planning)     Atrial fibrillation, unspecified type (H)     Gastrointestinal hemorrhage, unspecified gastrointestinal hemorrhage type     Iron deficiency     Adverse effect of iron     History of bone marrow biopsy     Herpes zoster without complication     Past Surgical History:   Procedure Laterality Date     ARTHROSCOPY SHOULDER RT/LT  1999, 2004    Bilateral, right then left     BONE MARROW BIOPSY, BONE SPECIMEN, NEEDLE/TROCAR N/A 8/29/2017    Procedure: BIOPSY BONE MARROW;  BONE MARROW BIOPSY;  Surgeon: Marino Cohen MD;  Location:  GI     C DEXA, BONE DENSITY, AXIAL SKEL       C MAMMOGRAM, SCREENING  1/2009     COLONOSCOPY N/A 10/7/2016    Procedure: COMBINED COLONOSCOPY, SINGLE OR MULTIPLE BIOPSY/POLYPECTOMY BY BIOPSY;  Surgeon: Cassandra Mccord MD;  Location:  GI     ESOPHAGOSCOPY, GASTROSCOPY, DUODENOSCOPY (EGD), COMBINED N/A 8/10/2017    Procedure: COMBINED ESOPHAGOSCOPY, GASTROSCOPY, DUODENOSCOPY (EGD), BIOPSY SINGLE OR MULTIPLE;  gastroscopy;  Surgeon: Helene Bustamante MD;  Location:  GI     H ABLATION AV NODE  10/2012     HC COLONOSCOPY THRU STOMA, DIAGNOSTIC  ? 2005     IMPLANT PACEMAKER  10/2012    [place] [id number], [id number]     JOINT REPLACEMTN, KNEE RT/LT      Joint Replacement knee bilateral     LAPAROSCOPIC CHOLECYSTECTOMY WITH CHOLANGIOGRAMS N/A 12/14/2015    Procedure: LAPAROSCOPIC CHOLECYSTECTOMY WITH CHOLANGIOGRAMS;  Surgeon: Ervin Amos MD;  Location:  OR     LUMPECTOMY BREAST      Left-2004      PHACOEMULSIFICATION CLEAR CORNEA WITH STANDARD INTRAOCULAR LENS IMPLANT Left 2015    Procedure: PHACOEMULSIFICATION CLEAR CORNEA WITH STANDARD INTRAOCULAR LENS IMPLANT;  Surgeon: Sai Obregon MD;  Location: [facility]     PHACOEMULSIFICATION CLEAR CORNEA WITH TORIC INTRAOCULAR LENS IMPLANT Right 2017    Procedure: PHACOEMULSIFICATION CLEAR CORNEA WITH TORIC INTRAOCULAR LENS IMPLANT;  RIGHT EYE PHACOEMULSIFICATION CLEAR CORNEA WITH TORIC INTRAOCULAR LENS IMPLANT ;  Surgeon: Duc Richmond MD;  Location: [facility]       Social History   Substance Use Topics     Smoking status: Current Some Day Smoker     Packs/day: 0.25     Years: 45.00     Types: Cigarettes     Smokeless tobacco: Never Used      Comment: 01/25/15 6 or less cigarettes per day, intermittent     Alcohol use No     Family History   Problem Relation Age of Onset     Hypertension Mother      DIABETES Mother       at 83 yrs     C.A.D. Father       age 70s     Breast Cancer No family hx of      Colon Cancer No family hx of          Current Outpatient Prescriptions   Medication Sig Dispense Refill     HYDROcodone-acetaminophen (NORCO) 5-325 MG per tablet Take 0.5-1 tablets by mouth 2 times daily as needed for moderate to severe pain maximum 2 tablet(s) per day 60 tablet 0     ondansetron (ZOFRAN ODT) 4 MG ODT tab Take 1 tablet (4 mg) by mouth every 6 hours as needed for nausea 30 tablet 1     apixaban ANTICOAGULANT (ELIQUIS) 2.5 MG tablet Take 1 tablet (2.5 mg) by mouth 2 times daily 60 tablet 3     betamethasone valerate (VALISONE) 0.1 % cream APPLY TOPICALLY 2 TIMES DAILY USE SPARINGLY 15 g 1     lisinopril (PRINIVIL/ZESTRIL) 10 MG tablet Take 1 tablet (10 mg) by mouth daily 90 tablet 1     gabapentin (NEURONTIN) 300 MG capsule Take 1 capsule (300 mg) by mouth 2 times daily 60 capsule 3     ranitidine (ZANTAC) 150 MG tablet Take 1 tablet (150 mg) by mouth 2 times daily 60 tablet 1     furosemide (LASIX) 20 MG tablet TAKE 1-2 TABS BY  MOUTH ONCE DAILY. MAY REPEAT AFTER NOON IF NEEDED FOR WEIGHT GAIN/2+ EDEMA 180 tablet 1     tiotropium (SPIRIVA HANDIHALER) 18 MCG capsule Inhale 1 capsule (18 mcg) into the lungs daily 30 capsule 3     simvastatin (ZOCOR) 10 MG tablet Take 1 tablet (10 mg) by mouth At Bedtime 90 tablet 1     hydrOXYzine (VISTARIL) 25 MG capsule Take 1 capsule (25 mg) by mouth 2 times daily as needed for itching 180 capsule 1     fluticasone (FLONASE) 50 MCG/ACT spray INHALE 1 TO 2 SPRAYS INTO BOTH NOSTRILS DAILY 16 mL 3     COMPOUND (CMPD RX) - PHARMACY TO MIX COMPOUNDED MEDICATION Vaginal valium- 10 mg suppository 42 suppository 3     hydrocortisone (ANUSOL-HC) 2.5 % rectal cream Place rectally 2 times daily as needed for hemorrhoids 28 g 1     ACCU-CHEK SMARTVIEW test strip 1 strip by In Vitro route 2 times daily Or as directed 100 each 1     blood glucose monitoring (ACCU-CHEK FASTCLIX) lancets USE TO TEST BLOOD SUGAR TWO TIMES A DAY OR AS DIRECTED 1 Box 11     Multiple Vitamins-Minerals (PRESERVISION/LUTEIN) CAPS Take 1 capsule by mouth daily        nystatin (MYCOSTATIN) 361535 UNIT/GM POWD Apply topically 2 times daily as needed 60 g 1     ORDER FOR DME, SET TO LOCAL PRINT, Equipment being ordered: AccuChek Smart View strips  Patient tests twice daily. 1 each 11     albuterol (PROAIR HFA, PROVENTIL HFA, VENTOLIN HFA) 108 (90 BASE) MCG/ACT inhaler Inhale 2 puffs into the lungs every 4 hours as needed for shortness of breath / dyspnea or wheezing 1 Inhaler 5     magnesium hydroxide (MILK OF MAGNESIA) 400 MG/5ML suspension Take 30 mLs by mouth At Bedtime        glipiZIDE (GLUCOTROL) 5 MG tablet TAKE 1 TABLET BY MOUTH EVERY MORNING AND 0.5 TABLET EVERY EVENING 135 tablet 1     [DISCONTINUED] GLIPIZIDE PO Take 5 mg by mouth every morning (before breakfast)       traMADol (ULTRAM) 50 MG tablet Take 1 tablet (50 mg) by mouth every 6 hours as needed for moderate pain (Patient not taking: Reported on 10/16/2017) 100 tablet 1  "    Allergies   Allergen Reactions     Penicillins Hives     Ambien [Zolpidem Tartrate]      Hallucinations and fell out of bed at night.     Definity      Caused a syncopal episode.     Sulfa Drugs Itching     Cymbalta Rash     Fluconazole Rash         Reviewed and updated as needed this visit by clinical staff  Tobacco  Allergies  Meds  Soc Hx      Reviewed and updated as needed this visit by Provider         ROS:  Constitutional, neuro, ENT, endocrine, pulmonary, cardiac, gastrointestinal, genitourinary, musculoskeletal, integument and psychiatric systems are negative, except as otherwise noted.      This document serves as a record of the services and decisions personally performed and made by Neisha Esparza MD. It was created on her behalf by Edith Ward, a trained medical scribe. The creation of this document is based the provider's statements to the medical scribe.  Edith Ward 2:15 PM October 16, 2017  OBJECTIVE:                                                    /75 (BP Location: Right arm, Patient Position: Chair, Cuff Size: Adult Regular)  Pulse 69  Temp 97.4  F (36.3  C) (Oral)  Ht 5' 6\" (1.676 m)  Wt 162 lb 1.6 oz (73.5 kg)  LMP  (LMP Unknown)  SpO2 97%  BMI 26.16 kg/m2  Body mass index is 26.16 kg/(m^2).     GENERAL APPEARANCE: healthy, alert and no distress  EYES: Eyes grossly normal to inspection, PERRL and conjunctivae and sclerae normal  RESP: lungs clear to auscultation - no rales, rhonchi or wheezes  CV: regular rates and rhythm, normal S1 S2, no S3  SKIN: no rash seen       Reviewed with patient labs drawn on 10/11/2017, Hematology progress notes, and upper GI endoscopy (8/10/2017).    Reviewed immunization status - she is due for high dose influenza vaccine       ASSESSMENT/PLAN:                                                      Helene was seen today for follow up for shingles.    Diagnoses and all orders for this visit:    Herpes zoster without complication  -     " HYDROcodone-acetaminophen (NORCO) 5-325 MG per tablet; Take 0.5-1 tablets by mouth 2 times daily as needed for moderate to severe pain maximum 2 tablet(s) per day  Rash improved, but persistent neuralgia   Educated on appropriate use of narcotic pain medication and importance of me being sole prescriber of medication  Patient agrees to abide by these guidelines    Other chronic pain  Recently seen at [facility]  Records brought in by patient and will be scanned into our system  Her pain has improved somewhat with increased dose of gabapentin  Per records, they are holding steroid injection until her thoracic back pain improves.     Nausea  -     ondansetron (ZOFRAN ODT) 4 MG ODT tab; Take 1 tablet (4 mg) by mouth every 6 hours as needed for nausea    Type 2 diabetes mellitus with diabetic nephropathy, without long-term current use of insulin (H)  Well-controlled with glipizide  Recheck A1c in 2 months  Lab Results   Component Value Date    A1C 6.7 08/10/2017    A1C 6.4 04/25/2017    A1C 6.2 09/27/2016    A1C 6.0 03/11/2016    A1C 6.1 01/03/2016       Atrial fibrillation, unspecified type (H)  -     apixaban ANTICOAGULANT (ELIQUIS) 2.5 MG tablet; Take 1 tablet (2.5 mg) by mouth 2 times daily  Educated on stroke prevention with anticoagulation therapy  The  decrease dose of eliquis is  secondary to her age and history of GI bleed    Chronic diastolic congestive heart failure (H)  Educated on use of diuretic, she is currently prescribed Lasix 20 mg 1-2 times per day.  Advised to hold diuretic when dehydrated.        The total visit time was 25 minutes, more than 50% was spent in counseling and coordination of care as discussed above.    Patient Instructions   Norco can be habit-forming. It should be taken as prescribed. Do not mix it  with alcohol. Be careful with driving.Do not loose the  Prescription.  Do not overuse this medication. It is a controlled substance.  Continue gabapentin  Keep appointment with    Continue eliquis 2.5 mg twice daily  Follow up in 2 months  Seek sooner medical attention if there is any worsening of symptoms or problems.          The information in this document, created by a scribe for me, accurately reflects the services I personally performed and the decisions made by me. I have reviewed and approved this document for accuracy.  2:37 PM October 16, 2017      Neisha Esparza MD  [facility]

## 2017-10-16 NOTE — PATIENT INSTRUCTIONS
Norco can be habit-forming. It should be taken as prescribed. Do not mix it  with alcohol. Be careful with driving.Do not loose the  Prescription.  Do not overuse this medication. It is a controlled substance.  Continue gabapentin  Keep appointment with   Continue eliquis 2.5 mg twice daily  Follow up in 2 months  Seek sooner medical attention if there is any worsening of symptoms or problems.

## 2017-10-16 NOTE — MR AVS SNAPSHOT
After Visit Summary   10/16/2017    Helene Gibbs    MRN: [id number]           Patient Information     Date Of Birth          1935        Visit Information        Provider Department      10/16/2017 2:00 PM Neisha Esparza MD [facility]        Today's Diagnoses     Herpes zoster without complication    -  1    Other chronic pain        Nausea        Type 2 diabetes mellitus with diabetic nephropathy, without long-term current use of insulin (H)        Atrial fibrillation, unspecified type (H)          Care Instructions    Norco can be habit-forming. It should be taken as prescribed. Do not mix it  with alcohol. Be careful with driving.Do not loose the  Prescription.  Do not overuse this medication. It is a controlled substance.  Continue gabapentin  Keep appointment with   Continue eliquis 2.5 mg twice daily  Follow up in 2 months  Seek sooner medical attention if there is any worsening of symptoms or problems.            Follow-ups after your visit        Your next 10 appointments already scheduled     Oct 17, 2017  2:00 PM CDT   Return Visit with Estiven Cali MD   [facility] ([facility])    [facility]  [address]  [facility] [id number] [phone]            Jan 23, 2018  1:15 PM CST   [place] with STONE TECH1   [facility] [place] ([facility])    [address]  [facility] [id number] [phone]              Who to contact     If you have questions or need follow up information about today's clinic visit or your schedule please contact [facility] directly at [phone].  Normal or non-critical lab and imaging results will be communicated to you by MyChart, letter or phone within 4 business days after the clinic has received the results. If you do not hear from us within 7 days, please contact the clinic through MyChart or phone.  "If you have a critical or abnormal lab result, we will notify you by phone as soon as possible.  Submit refill requests through [employer] or call your pharmacy and they will forward the refill request to us. Please allow 3 business days for your refill to be completed.          Additional Information About Your Visit        Care EveryWhere ID     This is your Care EveryWhere ID. This could be used by other organizations to access your [facility] medical records  [id number]        Your Vitals Were     Pulse Temperature Height Last Period Pulse Oximetry BMI (Body Mass Index)    69 97.4  F (36.3  C) (Oral) 5' 6\" (1.676 m) (LMP Unknown) 97% 26.16 kg/m2       Blood Pressure from Last 3 Encounters:   10/16/17 142/75   09/29/17 133/57   09/29/17 139/64    Weight from Last 3 Encounters:   10/16/17 162 lb 1.6 oz (73.5 kg)   09/29/17 164 lb (74.4 kg)   09/29/17 164 lb (74.4 kg)              Today, you had the following     No orders found for display         Today's Medication Changes          These changes are accurate as of: 10/16/17  2:33 PM.  If you have any questions, ask your nurse or doctor.               Start taking these medicines.        Dose/Directions    ondansetron 4 MG ODT tab   Commonly known as:  ZOFRAN ODT   Used for:  Nausea   Started by:  Neisha Esparza MD        Dose:  4 mg   Take 1 tablet (4 mg) by mouth every 6 hours as needed for nausea   Quantity:  30 tablet   Refills:  1         These medicines have changed or have updated prescriptions.        Dose/Directions    glipiZIDE 5 MG tablet   Commonly known as:  GLUCOTROL   This may have changed:  Another medication with the same name was removed. Continue taking this medication, and follow the directions you see here.   Used for:  Type 2 diabetes mellitus with diabetic nephropathy, without long-term current use of insulin (H)   Changed by:  Neisha Esparza MD        TAKE 1 TABLET BY MOUTH EVERY MORNING AND 0.5 TABLET EVERY EVENING   Quantity:  135 " tablet   Refills:  1       HYDROcodone-acetaminophen 5-325 MG per tablet   Commonly known as:  NORCO   This may have changed:  when to take this   Used for:  Herpes zoster without complication   Changed by:  Neisha Esparza MD        Dose:  0.5-1 tablet   Take 0.5-1 tablets by mouth 2 times daily as needed for moderate to severe pain maximum 2 tablet(s) per day   Quantity:  60 tablet   Refills:  0            Where to get your medicines      These medications were sent to [facility], [place] - [address]  [address], [address], [facility] [address]     Phone:  [phone]     apixaban ANTICOAGULANT 2.5 MG tablet    ondansetron 4 MG ODT tab         Some of these will need a paper prescription and others can be bought over the counter.  Ask your nurse if you have questions.     Bring a paper prescription for each of these medications     HYDROcodone-acetaminophen 5-325 MG per tablet                Primary Care Provider Office Phone # Fax #    Neisha Esparza [phone] [address]  [facility] [address]        Goals        General    start date: 4/17/14 I will weigh myself daily and if any weight gain or shortness of breath I will call the clinic. (pt-stated)     Notes - Note created  4/17/2014  3:59 PM by Margareth Pichardo, RN    As of today's date 4/17/2014 goal is met at 0 - 25%.   Goal Status:  Active        Equal Access to Services     [facility]: Hadii sukhdeep bundyo Caitlyn, waaxda luqadaha, qaybta kaalmada maddy, pamela mccullough . So [facility] [phone].    ATENCIÓN: Si habla prema, tiene a gupta disposición servicios gratuitos de asistencia lingüística. Llame al [phone].    We comply with applicable federal civil rights laws and [place] laws. We do not discriminate on the basis of race, color, national origin, age, disability, sex, sexual orientation, or gender  identity.            Thank you!     Thank you for choosing [facility]  for your care. Our goal is always to provide you with excellent care. Hearing back from our patients is one way we can continue to improve our services. Please take a few minutes to complete the written survey that you may receive in the mail after your visit with us. Thank you!             Your Updated Medication List - Protect others around you: Learn how to safely use, store and throw away your medicines at www.disposemymeds.org.          This list is accurate as of: 10/16/17  2:33 PM.  Always use your most recent med list.                   Brand Name Dispense Instructions for use Diagnosis    ACCU-CHEK SMARTVIEW test strip   Generic drug:  blood glucose monitoring     100 each    1 strip by In Vitro route 2 times daily Or as directed    Type 2 diabetes mellitus with diabetic nephropathy (H)       albuterol 108 (90 BASE) MCG/ACT Inhaler    PROAIR HFA/PROVENTIL HFA/VENTOLIN HFA    1 Inhaler    Inhale 2 puffs into the lungs every 4 hours as needed for shortness of breath / dyspnea or wheezing    COPD (chronic obstructive pulmonary disease) (H)       apixaban ANTICOAGULANT 2.5 MG tablet    ELIQUIS    60 tablet    Take 1 tablet (2.5 mg) by mouth 2 times daily    Atrial fibrillation, unspecified type (H)       betamethasone valerate 0.1 % cream    VALISONE    15 g    APPLY TOPICALLY 2 TIMES DAILY USE SPARINGLY    Rash       blood glucose monitoring lancets     1 Box    USE TO TEST BLOOD SUGAR TWO TIMES A DAY OR AS DIRECTED    Type 2 diabetes mellitus with diabetic nephropathy (H)       COMPOUNDED NON-CONTROLLED SUBSTANCE - PHARMACY TO MIX COMPOUNDED MEDICATION    CMPD RX    42 suppository    Vaginal valium- 10 mg suppository    Vaginal pain, Pelvic floor tension, Urinary retention       fluticasone 50 MCG/ACT spray    FLONASE    16 mL    INHALE 1 TO 2 SPRAYS INTO BOTH NOSTRILS DAILY    Rhinitis, unspecified type       furosemide 20 MG  tablet    LASIX    180 tablet    TAKE 1-2 TABS BY MOUTH ONCE DAILY. MAY REPEAT AFTER NOON IF NEEDED FOR WEIGHT GAIN/2+ EDEMA    Swelling       gabapentin 300 MG capsule    NEURONTIN    60 capsule    Take 1 capsule (300 mg) by mouth 2 times daily    Neuropathy       glipiZIDE 5 MG tablet    GLUCOTROL    135 tablet    TAKE 1 TABLET BY MOUTH EVERY MORNING AND 0.5 TABLET EVERY EVENING    Type 2 diabetes mellitus with diabetic nephropathy, without long-term current use of insulin (H)       HYDROcodone-acetaminophen 5-325 MG per tablet    NORCO    60 tablet    Take 0.5-1 tablets by mouth 2 times daily as needed for moderate to severe pain maximum 2 tablet(s) per day    Herpes zoster without complication       hydrocortisone 2.5 % cream    ANUSOL-HC    28 g    Place rectally 2 times daily as needed for hemorrhoids    External hemorrhoids       hydrOXYzine 25 MG capsule    VISTARIL    180 capsule    Take 1 capsule (25 mg) by mouth 2 times daily as needed for itching    Itching       lisinopril 10 MG tablet    PRINIVIL/ZESTRIL    90 tablet    Take 1 tablet (10 mg) by mouth daily    Essential hypertension       magnesium hydroxide 400 MG/5ML suspension    MILK OF MAGNESIA     Take 30 mLs by mouth At Bedtime        nystatin 606252 UNIT/GM Powd    MYCOSTATIN    60 g    Apply topically 2 times daily as needed    Rash       ondansetron 4 MG ODT tab    ZOFRAN ODT    30 tablet    Take 1 tablet (4 mg) by mouth every 6 hours as needed for nausea    Nausea       order for DME     1 each    Equipment being ordered: AccuChek Smart View strips Patient tests twice daily.    Type II or unspecified type diabetes mellitus without mention of complication, not stated as uncontrolled       PRESERVISION/LUTEIN Caps      Take 1 capsule by mouth daily        ranitidine 150 MG tablet    ZANTAC    60 tablet    Take 1 tablet (150 mg) by mouth 2 times daily    Dyspepsia       simvastatin 10 MG tablet    ZOCOR    90 tablet    Take 1 tablet (10 mg) by  mouth At Bedtime    Type 2 diabetes mellitus with diabetic nephropathy, without long-term current use of insulin (H)       tiotropium 18 MCG capsule    SPIRIVA HANDIHALER    30 capsule    Inhale 1 capsule (18 mcg) into the lungs daily    Chronic obstructive pulmonary disease, unspecified COPD type (H)       traMADol 50 MG tablet    ULTRAM    100 tablet    Take 1 tablet (50 mg) by mouth every 6 hours as needed for moderate pain    Intractable back pain

## 2017-10-16 NOTE — NURSING NOTE
"Chief Complaint   Patient presents with     Follow Up For     shingles       Initial /75 (BP Location: Right arm, Patient Position: Chair, Cuff Size: Adult Regular)  Pulse 69  Temp 97.4  F (36.3  C) (Oral)  Ht 5' 6\" (1.676 m)  Wt 162 lb 1.6 oz (73.5 kg)  LMP  (LMP Unknown)  SpO2 97%  BMI 26.16 kg/m2 Estimated body mass index is 26.16 kg/(m^2) as calculated from the following:    Height as of this encounter: 5' 6\" (1.676 m).    Weight as of this encounter: 162 lb 1.6 oz (73.5 kg).  Medication Reconciliation: complete   Lucy Godfrey MA  "

## 2017-10-16 NOTE — PROGRESS NOTES
Injectable Influenza Immunization Documentation    1.  Is the person to be vaccinated sick today?   No    2. Does the person to be vaccinated have an allergy to a component   of the vaccine?   No    3. Has the person to be vaccinated ever had a serious reaction   to influenza vaccine in the past?   No    4. Has the person to be vaccinated ever had Guillain-Barré syndrome?   No    Form completed by patient  Lucy Godfrey MA  Prior to injection verified patient identity using patient's name and date of birth.

## 2017-10-17 ENCOUNTER — ONCOLOGY VISIT (OUTPATIENT)
Dept: ONCOLOGY | Facility: CLINIC | Age: 82
End: 2017-10-17
Attending: INTERNAL MEDICINE
Payer: MEDICARE

## 2017-10-17 VITALS
WEIGHT: 162.6 LBS | BODY MASS INDEX: 26.24 KG/M2 | DIASTOLIC BLOOD PRESSURE: 74 MMHG | OXYGEN SATURATION: 100 % | RESPIRATION RATE: 18 BRPM | SYSTOLIC BLOOD PRESSURE: 144 MMHG | HEART RATE: 69 BPM

## 2017-10-17 DIAGNOSIS — D64.9 ANEMIA, UNSPECIFIED TYPE: Primary | ICD-10-CM

## 2017-10-17 DIAGNOSIS — E61.1 IRON DEFICIENCY: ICD-10-CM

## 2017-10-17 PROCEDURE — 99211 OFF/OP EST MAY X REQ PHY/QHP: CPT

## 2017-10-17 PROCEDURE — 99213 OFFICE O/P EST LOW 20 MIN: CPT | Performed by: INTERNAL MEDICINE

## 2017-10-17 ASSESSMENT — PAIN SCALES - GENERAL: PAINLEVEL: EXTREME PAIN (8)

## 2017-10-17 NOTE — PROGRESS NOTES
"Oncology Rooming Note    October 17, 2017 2:07 PM   Helene Gibbs is a 82 year old female who presents for:    Chief Complaint   Patient presents with     Oncology Clinic Visit     Tubular adenoma     Initial Vitals: /74 (BP Location: Left arm, Patient Position: Sitting, Cuff Size: Adult Regular)  Pulse 69  Resp 18  Wt 73.8 kg (162 lb 9.6 oz)  LMP  (LMP Unknown)  SpO2 100%  BMI 26.24 kg/m2 Estimated body mass index is 26.24 kg/(m^2) as calculated from the following:    Height as of 10/16/17: 1.676 m (5' 6\").    Weight as of this encounter: 73.8 kg (162 lb 9.6 oz). Body surface area is 1.85 meters squared.  Extreme Pain (8) Comment: pain from shingles on back and buttocks   No LMP recorded (lmp unknown). Patient is postmenopausal.  Allergies reviewed: Yes  Medications reviewed: Yes    Medications: Medication refills not needed today.  Pharmacy name entered into [facility]:    [facility] [place], [place] - [address]  [id number], [place] - [address] [address] [facility], [place] - [address] AT [address] [place] [address]    Clinical concerns: None                               4 minutes for nursing intake (face to face time)     Radha Sadler MA    DISCHARGE PLAN:  Next appointments: See patient instruction section.  Brought the patient to the [facility] for scheduling.  Departure Mode: Ambulatory  Accompanied by: self  4 minutes for nursing discharge (face to face time)   Radha Sadler MA    Follow up in 3 months with labs. Sent staff message to schedulers 10/18    "

## 2017-10-17 NOTE — MR AVS SNAPSHOT
After Visit Summary   10/17/2017    Helene Gibbs    MRN: [id number]           Patient Information     Date Of Birth          1935        Visit Information        Provider Department      10/17/2017 2:00 PM Estiven Cali MD [facility]        Today's Diagnoses     Anemia, unspecified type    -  1    Iron deficiency          Care Instructions    Follow up in 3 months with labs. Sent staff message to schedulers 10/18          Follow-ups after your visit        Your next 10 appointments already scheduled     Jan 09, 2018  1:30 PM CST   Return Visit with  Oncology Nurse   [facility] ([facility])    [facility]  [address]  [facility] [address]   [phone]            Jan 16, 2018  2:00 PM CST   Return Visit with Estiven Cali MD   [facility] ([facility])    [facility]  [address]  [facility] [address] [phone]            Jan 23, 2018  1:15 PM CST   [facility] with STONE TECH1   [facility] [place] [facility])    [address]  [facility] [address] [phone]              Who to contact     If you have questions or need follow up information about today's clinic visit or your schedule please contact [facility] directly at [phone].  Normal or non-critical lab and imaging results will be communicated to you by MyChart, letter or phone within 4 business days after the clinic has received the results. If you do not hear from us within 7 days, please contact the clinic through MyChart or phone. If you have a critical or abnormal lab result, we will notify you by phone as soon as possible.  Submit refill requests through [facility] or call your pharmacy and they will forward the refill request to us. Please allow 3 business days for your refill to be completed.          Additional  Information About Your Visit        Care EveryWhere ID     This is your Care EveryWhere ID. This could be used by other organizations to access your [place] medical records  [id number]        Your Vitals Were     Pulse Respirations Last Period Pulse Oximetry BMI (Body Mass Index)       69 18 (LMP Unknown) 100% 26.24 kg/m2        Blood Pressure from Last 3 Encounters:   No data found for BP    Weight from Last 3 Encounters:   No data found for Wt              Today, you had the following     No orders found for display       Primary Care Provider Office Phone # Fax #    Neisha MINAYA MD [place] [phone] [address]  [place]                [place] [address]        Goals        General    start date: 4/17/14 I will weigh myself daily and if any weight gain or shortness of breath I will call the clinic. (pt-stated)     Notes - Note created  4/17/2014  3:59 PM by Margareth Pichardo RN    As of today's date 4/17/2014 goal is met at 0 - 25%.   Goal Status:  Active        Equal Access to Services     [facility]: Hadii aad ku hadasho Sojovanyali, waaxda luqadaha, qaybta kaalmada adeegyada, waxay mariin carl mccullough . So [facility] [phone].    ATENCIÓN: Si habla español, tiene a gupta disposición servicios gratuitos de asistencia lingüística. Llame al [phone].    We comply with applicable federal civil rights laws and [place] laws. We do not discriminate on the basis of race, color, national origin, age, disability, sex, sexual orientation, or gender identity.            Thank you!     Thank you for choosing [facility]  for your care. Our goal is always to provide you with excellent care. Hearing back from our patients is one way we can continue to improve our services. Please take a few minutes to complete the written survey that you may receive in the mail after your visit with us. Thank you!             Your Updated Medication List - Protect others around you: Learn  how to safely use, store and throw away your medicines at www.disposemymeds.org.          This list is accurate as of: 10/17/17 11:59 PM.  Always use your most recent med list.                   Brand Name Dispense Instructions for use Diagnosis    ACCU-CHEK SMARTVIEW test strip   Generic drug:  blood glucose monitoring     100 each    1 strip by In Vitro route 2 times daily Or as directed    Type 2 diabetes mellitus with diabetic nephropathy (H)       albuterol 108 (90 BASE) MCG/ACT Inhaler    PROAIR HFA/PROVENTIL HFA/VENTOLIN HFA    1 Inhaler    Inhale 2 puffs into the lungs every 4 hours as needed for shortness of breath / dyspnea or wheezing    COPD (chronic obstructive pulmonary disease) (H)       apixaban ANTICOAGULANT 2.5 MG tablet    ELIQUIS    60 tablet    Take 1 tablet (2.5 mg) by mouth 2 times daily    Atrial fibrillation, unspecified type (H)       betamethasone valerate 0.1 % cream    VALISONE    15 g    APPLY TOPICALLY 2 TIMES DAILY USE SPARINGLY    Rash       blood glucose monitoring lancets     1 Box    USE TO TEST BLOOD SUGAR TWO TIMES A DAY OR AS DIRECTED    Type 2 diabetes mellitus with diabetic nephropathy (H)       COMPOUNDED NON-CONTROLLED SUBSTANCE - PHARMACY TO MIX COMPOUNDED MEDICATION    CMPD RX    42 suppository    Vaginal valium- 10 mg suppository    Vaginal pain, Pelvic floor tension, Urinary retention       fluticasone 50 MCG/ACT spray    FLONASE    16 mL    INHALE 1 TO 2 SPRAYS INTO BOTH NOSTRILS DAILY    Rhinitis, unspecified type       furosemide 20 MG tablet    LASIX    180 tablet    TAKE 1-2 TABS BY MOUTH ONCE DAILY. MAY REPEAT AFTER NOON IF NEEDED FOR WEIGHT GAIN/2+ EDEMA    Swelling       gabapentin 300 MG capsule    NEURONTIN    60 capsule    Take 1 capsule (300 mg) by mouth 2 times daily    Neuropathy       glipiZIDE 5 MG tablet    GLUCOTROL    135 tablet    TAKE 1 TABLET BY MOUTH EVERY MORNING AND 0.5 TABLET EVERY EVENING    Type 2 diabetes mellitus with diabetic nephropathy,  without long-term current use of insulin (H)       HYDROcodone-acetaminophen 5-325 MG per tablet    NORCO    60 tablet    Take 0.5-1 tablets by mouth 3 times daily as needed for moderate to severe pain maximum 3 tablet(s) per day.  (CALL PHARM with VERBAL if ok to increase so can fill early)    Herpes zoster without complication       hydrocortisone 2.5 % cream    ANUSOL-HC    28 g    Place rectally 2 times daily as needed for hemorrhoids    External hemorrhoids       hydrOXYzine 25 MG capsule    VISTARIL    180 capsule    Take 1 capsule (25 mg) by mouth 2 times daily as needed for itching    Itching       lisinopril 10 MG tablet    PRINIVIL/ZESTRIL    90 tablet    Take 1 tablet (10 mg) by mouth daily    Essential hypertension       magnesium hydroxide 400 MG/5ML suspension    MILK OF MAGNESIA     Take 30 mLs by mouth At Bedtime        nystatin 668105 UNIT/GM Powd    MYCOSTATIN    60 g    Apply topically 2 times daily as needed    Rash       ondansetron 4 MG ODT tab    ZOFRAN ODT    30 tablet    Take 1 tablet (4 mg) by mouth every 6 hours as needed for nausea    Nausea       order for DME     1 each    Equipment being ordered: AccuChek Smart View strips Patient tests twice daily.    Type II or unspecified type diabetes mellitus without mention of complication, not stated as uncontrolled       PRESERVISION/LUTEIN Caps      Take 1 capsule by mouth daily        ranitidine 150 MG tablet    ZANTAC    60 tablet    Take 1 tablet (150 mg) by mouth 2 times daily    Dyspepsia       simvastatin 10 MG tablet    ZOCOR    90 tablet    Take 1 tablet (10 mg) by mouth At Bedtime    Type 2 diabetes mellitus with diabetic nephropathy, without long-term current use of insulin (H)       tiotropium 18 MCG capsule    SPIRIVA HANDIHALER    30 capsule    Inhale 1 capsule (18 mcg) into the lungs daily    Chronic obstructive pulmonary disease, unspecified COPD type (H)       traMADol 50 MG tablet    ULTRAM    100 tablet    Take 1 tablet (50  mg) by mouth every 6 hours as needed for moderate pain    Intractable back pain

## 2017-10-18 NOTE — PROGRESS NOTES
HEMATOLOGY HISTORY: Ms. Helene Gibbs is a female with anemia and MGUS (IgM lamda).   1.  On 09/26/2016, hemoglobin of 15.7.   2.  On 04/25/2017, hemoglobin of 11.8 with MCV of 93.   3.  On 08/09/2017:  -WBC of 7.4, hemoglobin of 6.6 and platelet of 299.  MCV of 79.  RDW of 17.1.   -Creatinine of 1.46.    -Calcium of 10.0.    -LFT is normal.    -Stool for occult blood positive.   4.  On 08/10/2017:  -Iron of 81 and saturation of 18%.    -Vitamin B12 1076.   -Folate of 17.8.   5.  EGD on 08/10/2017 reveals erythematous mucosa in the antrum.  Normal esophagus and duodenum. Biopsy reveals focal chronic inflammation.  No H. pylori.  No malignancy.   6.  Colonoscopy on 10/07/2016 had revealed tiny polyps and diverticulosis.  Otherwise normal.  Pathology revealed tubular adenoma without any high-grade dysplasia.   7. Multiple labs were done on is 08/22/2017.   -Erythropoietin of 138.   -LDH of 188.   -Serum protein electrophoresis reveals M spike of 0.1.   -Immunofixation reveals IgM lambda, IgM level of 197.   8. Bone marrow aspiration biopsy on 08/29/2017 reveals normocellular bone marrow with 20% cellularity.  There is trilineage hematopoietic maturation.  No evidence of dysplasia.  No increase in blasts.  Flow cytometry is normal.  Iron stains revealed decreased storage iron.   9. On 09/08/2017, ferritin of 6 and hemoglobin of 8.1.    10. IV Injectafer for 2 doses on 09/15/2017 and 09/22/2017.      SUBJECTIVE:    Ms. Helene Gibbs is an 82-year-old female who follows up in Hematologic Clinic for anemia.  The patient recently was evaluated for normochromic, normocytic anemia.  She had multiple investigations as described above.  Her anemia is multifactorial from iron deficiency, chronic kidney disease, anemia of chronic disease and hypocellular marrow.  Labs on 09/08/2017 has revealed a ferritin of 6 with hemoglobin of 8.1.  She was given IV Injectafer for 2 doses on 09/15/2017 and 09/22/2017.  She tolerated it well.   She is here for followup.      The patient in the meantime developed herpes zoster involving left flank.  She was treated with valacyclovir.  She has post-herpetic neuralgia.  She is on gabapentin.  Because of that she does not feel well.  She feels more weak and tired.      No headache.  No dizziness.  No chest pain or difficulty breathing.  No nausea or vomiting.      PHYSICAL EXAMINATION:   GENERAL:  She is alert and oriented x3.   VITAL SIGNS:  Reviewed.   Rest of the system not examined.      LABORATORY DATA:  Reviewed.  On 10/11/2016:  WBC of 6.1, hemoglobin 12.5 and platelets of 200.  MCV of 91.  Iron 56 and ferritin of 164.      ASSESSMENT:   1.  An 82-year-old female with normochromic normocytic anemia which has resolved.   2.  Iron deficiency which has resolved.   3. MGUS  4.  Postherpetic neuralgia.   5.  Multiple other medical problems including chronic kidney disease, hypertension, diabetes mellitus and atrial fibrillation.      PLAN:    1.  Labs were reviewed with the patient and her niece.  Patient was happy to know that she is no longer anemic.  Her iron deficiency has resolved.      I explained to the patient that she can become anemic and iron deficient in the future.  She is on Eliquis.  She is at risk of getting GI bleed.  Also, because of chronic kidney disease and multiple medical problems, she is at risk of normochromic, normocytic anemia.        At this time, I would recommend monitoring CBC and iron studies in 3 months.  She is agreeable for it.      2.  The patient has post-herpetic neuralgia.  Hopefully, it will get better in the next few weeks.  I told her it can last for a few months.      I will see her in 3 months with labs.  I told her to call us if she has worsening weakness, chest pain, shortness of breath, bleeding or any other concerns.       3.  Labs on 08/22/2017 revealed MGUS.  We will monitor MGUS with yearly labs.  The risk of progression to myeloma is very low at her age.        ALEE TIAN MD             D: 10/17/2017 15:36   T: 10/17/2017 20:31   MT: DA      Name:     PATRICIA BOBO   MRN:      [id number]        Account:      [id number]   :      1935           Visit Date:   10/17/2017      Document: [id number]

## 2017-10-19 ENCOUNTER — TELEPHONE (OUTPATIENT)
Dept: ONCOLOGY | Facility: CLINIC | Age: 82
End: 2017-10-19

## 2017-10-30 ENCOUNTER — TELEPHONE (OUTPATIENT)
Dept: FAMILY MEDICINE | Facility: CLINIC | Age: 82
End: 2017-10-30

## 2017-10-30 NOTE — TELEPHONE ENCOUNTER
Spoke with patient:   Shingles is getting worse   No rash   Pain is worsening  Across abdomen, under breast, on back right side near spinal cord, bottom   Even when breathes / has BM is worse   8-9/10 pain   600mg twice a day - gabapentin - Not helping   Also using Norco occasionally - Not helping     Advised OV to discuss worsening shingle pain. Also discussed ED visit if pain becomes unbearable. Scheduled for 11/1/17 with PCP.     Shawanda REID RN

## 2017-10-30 NOTE — TELEPHONE ENCOUNTER
Reason for call:  Patient reporting a symptom    Symptom or request: 5 weeks of shingles any suggestions not getting any better    Duration (how long have symptoms been present):     Have you been treated for this before? Yes    Additional comments:     Phone Number patient can be reached at:  Home number on file [phone] (home)    Best Time:  any    Can we leave a detailed message on this number:  YES    Call taken on 10/30/2017 at 9:13 AM by Michelle Chambers

## 2017-10-31 DIAGNOSIS — R10.13 DYSPEPSIA: ICD-10-CM

## 2017-10-31 RX ORDER — BLOOD-GLUCOSE CONTROL, NORMAL
EACH MISCELLANEOUS
Qty: 1 EACH | Refills: 0 | Status: CANCELLED | OUTPATIENT
Start: 2017-10-31

## 2017-11-01 ENCOUNTER — TELEPHONE (OUTPATIENT)
Dept: PALLIATIVE MEDICINE | Facility: CLINIC | Age: 82
End: 2017-11-01

## 2017-11-01 ENCOUNTER — OFFICE VISIT (OUTPATIENT)
Dept: FAMILY MEDICINE | Facility: CLINIC | Age: 82
End: 2017-11-01
Payer: MEDICARE

## 2017-11-01 VITALS
OXYGEN SATURATION: 98 % | TEMPERATURE: 98 F | WEIGHT: 163 LBS | DIASTOLIC BLOOD PRESSURE: 54 MMHG | HEART RATE: 72 BPM | BODY MASS INDEX: 26.2 KG/M2 | HEIGHT: 66 IN | SYSTOLIC BLOOD PRESSURE: 118 MMHG

## 2017-11-01 DIAGNOSIS — E11.21 TYPE 2 DIABETES MELLITUS WITH DIABETIC NEPHROPATHY, WITHOUT LONG-TERM CURRENT USE OF INSULIN (H): ICD-10-CM

## 2017-11-01 DIAGNOSIS — M54.9 INTRACTABLE BACK PAIN: Primary | ICD-10-CM

## 2017-11-01 DIAGNOSIS — M41.9 SCOLIOSIS OF LUMBAR SPINE, UNSPECIFIED SCOLIOSIS TYPE: ICD-10-CM

## 2017-11-01 DIAGNOSIS — Z79.899 CONTROLLED SUBSTANCE AGREEMENT SIGNED: ICD-10-CM

## 2017-11-01 DIAGNOSIS — G62.9 NEUROPATHY: ICD-10-CM

## 2017-11-01 DIAGNOSIS — M54.40 CHRONIC MIDLINE LOW BACK PAIN WITH SCIATICA, SCIATICA LATERALITY UNSPECIFIED: ICD-10-CM

## 2017-11-01 DIAGNOSIS — G89.29 CHRONIC MIDLINE LOW BACK PAIN WITH SCIATICA, SCIATICA LATERALITY UNSPECIFIED: ICD-10-CM

## 2017-11-01 DIAGNOSIS — R10.13 DYSPEPSIA: ICD-10-CM

## 2017-11-01 PROCEDURE — 99215 OFFICE O/P EST HI 40 MIN: CPT | Performed by: INTERNAL MEDICINE

## 2017-11-01 RX ORDER — GABAPENTIN 300 MG/1
600 CAPSULE ORAL 2 TIMES DAILY
COMMUNITY
End: 2017-11-21

## 2017-11-01 RX ORDER — HYDROCODONE BITARTRATE AND ACETAMINOPHEN 5; 325 MG/1; MG/1
1 TABLET ORAL EVERY 6 HOURS PRN
Qty: 90 TABLET | Refills: 0 | Status: SHIPPED | OUTPATIENT
Start: 2017-11-01 | End: 2017-11-24

## 2017-11-01 RX ORDER — BLOOD-GLUCOSE CONTROL, NORMAL
EACH MISCELLANEOUS
Qty: 1 EACH | Refills: 11 | Status: ON HOLD | OUTPATIENT
Start: 2017-11-01 | End: 2018-01-18

## 2017-11-01 RX ORDER — HYDROCODONE BITARTRATE AND ACETAMINOPHEN 5; 325 MG/1; MG/1
1 TABLET ORAL EVERY 6 HOURS PRN
Qty: 90 TABLET | Refills: 0 | Status: SHIPPED | OUTPATIENT
Start: 2017-11-01 | End: 2017-11-01

## 2017-11-01 NOTE — LETTER
November 1, 2017    Helene Gibbs  [address]   [facility] [address]    Dear Helene                                                                   Welcome to the [facility] at the [facility]. We are located at [address], [address], [place] [id number]. Your appointment has been scheduled on Friday, November 10th at 3:00 pm with Travis Bhardwaj MD.    At your first visit, you will meet your team of caregivers who will help you to develop pain management strategies that will last a lifetime. You will meet with our support staff to review your insurance information and collect your co-payment if required by your insurance company. You will meet with a medical pain specialist and care coordinator who will assess your pain and develop a plan of care for your successful pain rehabilitation. You should expect to spend 1-2 hours at your first visit with us. Usually, patients work with us for a period of 6-12 months, and eventually return to their primary doctor once their pain management has stabilized.      To help us make your visit go as smoothly as possible, please bring the following items with you on your visit:   Completed Pain Questionnaire enclosed in this packet.  If you do not bring the completed questionnaire, we may have to reschedule your appointment.  List of any medicines that you are currently taking or have been prescribed  Pertinent NON-[facility] medical information such as medical records or tests results (X-rays, or laboratory tests)  Your health insurance card  Financial resources to cover your co-payment or balance due at the time of service (cash, personal check, Visa, and MasterCard are acceptable methods of payment)     Due to the high demand for new patient evaluations, you must notify the scheduling department 48 hours in advance if you are not able to keep this appointment.  Failure to do so could affect your ability to  reschedule with our clinic. Please do not assume that you will receive any prescription medications at your first visit.    Please call [phone] with any questions regarding your appointment. We look forward to meeting you and working to address your health care needs.     Sincerely,      [facility]

## 2017-11-01 NOTE — TELEPHONE ENCOUNTER
LM for patient to schedule new patient evaluation and management.    Cassandra Mandel    [facility]

## 2017-11-01 NOTE — TELEPHONE ENCOUNTER
Routing refill request to provider for review/approval because:  Drug not on the [facility] refill protocol   Drug not active on patient's medication list  Qiana LOUIS RN

## 2017-11-01 NOTE — LETTER
[facility]    11/01/17    Patient: Helene Gibbs  YOB: 1935  Medical Record Number: [id number]                                                                  Controlled Substance Agreement  I understand that my care provider has prescribed controlled substances (narcotics, tranquilizers, and/or stimulants) to help manage my condition(s).  I am taking this medicine to help me function or work.  I know that this is strong medicine.  It could have serious side effects and even cause a dependency on the drug.  If I stop these medicines suddenly, I could have severe withdrawal symptoms.    The risks, benefits, and side effects of these medication(s) were explained to me.  I agree that:  1. I will take part in other treatments as advised by my provider.  This may be psychiatry or counseling, physical therapy, behavioral therapy, group treatment, or a referral to a pain clinic.  I will reduce or stop my medicine when my provider tells me to do so.   2. I will take my medicines as prescribed.  I will not change the dose or schedule unless my provider tells me to.  There will be no refills if I  run out early.   I may be contacted at any time without warning and asked to complete a drug test or pill count.   3. I will keep all my appointments at the clinic.  If I miss appointments or fail to follow instructions, my provider may stop my medicine.  4. I will not ask other providers to prescribe controlled substances. And I will not accept controlled substances from other people. If I need another prescribed controlled substance for a new reason, I will notify my provider within one business day.  5. If I enroll in the [place] Medical Marijuana program, I will tell my provider.  I will also sign an agreement to share my medical records with my provider.  6. I will use one pharmacy to fill all of my controlled substance prescriptions.  If my prescription is mailed to my pharmacy, it may take 5 to  7 days for my medicine to be ready.  7. I understand that my provider, clinic care team, and pharmacy can track controlled substance prescriptions from other providers through a central database (prescription monitoring program).  8. I will bring in my list of medications (or my medicine bottles) each time I come to the clinic.  REV- 04/2016                                                                                                                                            Page 1 of 2      [facility]    11/01/17    Patient: Helene Gibbs  YOB: 1935  Medical Record Number: [id number]    9. Refills of controlled substances will be made only during office hours.  It is up to me to make sure that I do not run out of my medicines on weekends or holidays.    10. I am responsible for my prescriptions.  If the medicine is lost or stolen, it will not be replaced.   I also agree not to share these medicines with anyone.  11. I agree to not use ANY illegal or recreational drugs.  This includes marijuana, cocaine, bath salts or other drugs.  I agree not to use alcohol unless my provider says I may.  I agree to give urine samples whenever asked.  If I fail to give a urine sample, the provider may stop my medicine.     12. I will tell my nurse or provider right away if I become pregnant or have a new medical problem treated outside of [facility].  13. I understand that this medicine can affect my thinking and judgment.  It may be unsafe for me to drive, use machinery and do dangerous tasks.  I will not do any of these things until I know how the medicine affects me.  If my dose changes, I will wait to see how it affects me.  I will contact my provider if I have concerns about medicine side effects.  I understand that if I do not follow any of the conditions above, my prescriptions or treatment may be stopped.    I agree that my provider, clinic care team, and pharmacy may work with any city,  state or federal law enforcement agency that investigates the misuse, sale, or other diversion of my controlled medicine. I will allow my provider to discuss my care with or share a copy of this agreement with any other treating provider, pharmacy or emergency room where I receive care.  I agree to give up (waive) any right of privacy or confidentiality with respect to these authorizations.   I have read this agreement and have asked questions about anything I did not understand.   ___________________________________    ___________________________  Patient Signature                                                           Date and Time  ___________________________________     ____________________________  Witness                                                                            Date and Time  ___________________________________  Neisha Esparza MD  REV-  04/2016                                                                                                                                                                 Page 2 of 2

## 2017-11-01 NOTE — PATIENT INSTRUCTIONS
Make appointment with pain clinic .  [place] can be habit-forming. It should be taken as prescribed. Do not mix it  with alcohol. Be careful with driving.Do not loose the  Prescription.  Do not overuse this medication. It is a controlled substance.  Follow up in one month.  Seek sooner medical attention if there is any worsening of symptoms or problems.

## 2017-11-01 NOTE — TELEPHONE ENCOUNTER
[facility] Referral      1. Confirmed address with patient? Yes  2. Confirmed phone number with patient? Yes  3. Confirmed referring provider? Yes  4. Is the PCP the same as the referring provider? Yes  5. Has the patient been to any previous pain clinics? Yes  (If yes, send ELSY with welcome letter)  6. Which insurance are we to bill for this appointment?  BCBS and Medicare    7. Informed pt of cancellation (48 hour) policy? Yes    REGARDING OPIOID MEDICATIONS: We will always address appropriateness of opioid pain medications, but we generally will not automatically take on a prescribing role. When we do take on prescribing of opioids for chronic pain, it is in collaboration with the referring physician for an intermediate period of time (months), with an expectation that the primary physician or provider will assume the prescribing role if medications are effective at stable doses with demonstrated compliance. Therefore, please do not assume that your prescribing responsibilities end on the day of pain clinic consultation.  7. Informed pt of prescribing policy? Yes      8. Referring Provider: Neisha Esparza     9. Criteria for Triage Eval:   -Missed/Failed 1st DUAL appointment? N/A   -Medication Focused? N/A   -Mental Health Concerns? (e.g. Recent psych hospitalization/snap shot)? N/A   -Active substance abuse? N/A   -Patient behaviors (e.g. Offensive language/raised voice)? N/A    Ching YANG    [facility]

## 2017-11-01 NOTE — PROGRESS NOTES
"  SUBJECTIVE:   Helene Gibbs is a 82 year old female who presents to clinic today for the following health issues:      Patient is complaining being about her chronic pain  She says she cannot live like this  She has pain in her lower back which is chronic  She uses more Norco  then prescribed due to the amount of pain  Patient says she doesn't know what triggered it  She is having pain in lower back it's like electric shock  She has pain around her thoracic area where she had shingles  She doesn't believe that she had shingles  She says this pain is so annoying  She did not like Dilaudid in the past and fentanyl patch  She has been taking gabapentin but doesn't think it is effective  Doesn't want to take too strong pain medication  She was upset that pharmacy won't fill her medication as she use more than what was prescribed  She states medication does not cause any major side effects except that she feels sleepy  She says she is not going to get addicted to those medication  On average she has been taking 3 norco  Due to anticoagulationand she cannot take nonsteroidal anti-inflammatory pain medication        Problem list and histories reviewed & adjusted, as indicated.  Additional history: as documented    Labs reviewed in EPIC    Reviewed and updated as needed this visit by clinical staff  Meds       Reviewed and updated as needed this visit by Provider         ROS:  Constitutional, neuro, ENT, endocrine, pulmonary, cardiac, gastrointestinal, genitourinary, musculoskeletal, integument and psychiatric systems are negative, except as otherwise noted.      OBJECTIVE:                                                    /54  Pulse 72  Temp 98  F (36.7  C) (Oral)  Ht 5' 6\" (1.676 m)  Wt 163 lb (73.9 kg)  LMP  (LMP Unknown)  SpO2 98%  Breastfeeding? No  BMI 26.31 kg/m2  Body mass index is 26.31 kg/(m^2).  GENERAL APPEARANCE: healthy, alert and mild distress  On exam she has  Scoliosis and she walks " with the help of walker  She was in tears when talking about her pain  She says that she cannot do things she would like to  Her overall activity is restricted due to the pain     ASSESSMENT/PLAN:                                                      Helene was seen today for shingles.    Diagnoses and all orders for this visit:    Intractable back pain  -     PAIN MANAGEMENT REFERRAL  Per patient her pain is not manageable with current medication  Due to her age and the fact she lives by herself I'm hesitant to give her strong pain medication  She has tried Dilaudid, fentanyl in the past  With minimal relief and more side effects of constipation  She is tolerating Norco well and taking on average 3 pain pills a day  I discussed with her that she should not overuse her medication  Without talking to us first  I made her sign another pain contract  I renewed her medication  Spoke to the pharmacy  Refer to the pain clinic to discuss other treatment option  like radiofrequency nerve ablation or epidural steroid injection  Patient was educated about opiod pain medication. It can be habit-forming. It should be taken as prescribed. Do not mix it  with alcohol. Be careful with driving.Do not loose the  Prescription.  Do not overuse this medication. It is a controlled substance.  Reviewed the note of [facility] where she was seen their impression is lumbosacral spine spondylosis, scoliosis with neural foramina stenosis at L4 and 5  Impinging bilateral L4 nerve root  She had a CT scan off the lumbar spine which showed multilevel level foraminal stenosis most severe at right L4 and 5    Multilevel advanced degenerative disc disease  I educated her about this  I told her that complete pain relief is difficult  But we are trying to work best to make your pain tolerable  Due to her age and she is on anticoagulation apixaben  We have limited choice  So pain clinic referral is done  Patient was educated about opiod pain medication. It  can be habit-forming. It should be taken as prescribed. Do not mix it  with alcohol. Be careful with driving.Do not loose the  Prescription.  Do not overuse this medication. It is a controlled substance.    Scoliosis of lumbar spine, unspecified scoliosis type  -     PAIN MANAGEMENT REFERRAL    Chronic midline low back pain with sciatica, sciatica laterality unspecified  -     PAIN MANAGEMENT REFERRAL    Type 2 diabetes mellitus with diabetic nephropathy, without long-term current use of insulin (H)  -     blood glucose calibration (NO BRAND SPECIFIED) solution; Use to calibrate blood glucose monitor as directed. Please dispense brand of solution that works with patient's current meter    Neuropathy:  She is on gabapentin    Dyspepsia  -     ranitidine (ZANTAC) 150 MG tablet; Take 1 tablet (150 mg) by mouth 2 times daily      The total visit time was 40 minutes more  than 50% was spent in counseling and coordination of care as discussed above.    This note was done by using voice recognition software.      Follow up with Provider - 1 month   Patient Instructions   Make appointment with pain clinic .  [place] can be habit-forming. It should be taken as prescribed. Do not mix it  with alcohol. Be careful with driving.Do not loose the  Prescription.  Do not overuse this medication. It is a controlled substance.  Follow up in one month.  Seek sooner medical attention if there is any worsening of symptoms or problems.          Neisha Esparza MD  [facility]

## 2017-11-01 NOTE — MR AVS SNAPSHOT
After Visit Summary   11/1/2017    Hleene Gibbs    MRN: [id number]           Patient Information     Date Of Birth          1935        Visit Information        Provider Department      11/1/2017 11:30 AM Neisha Esparza MD [facility]        Today's Diagnoses     Intractable back pain    -  1    Scoliosis of lumbar spine, unspecified scoliosis type        Chronic midline low back pain with sciatica, sciatica laterality unspecified        Type 2 diabetes mellitus with diabetic nephropathy, without long-term current use of insulin (H)        Neuropathy        Dyspepsia        Herpes zoster without complication          Care Instructions    Make appointment with pain clinic .  [place] can be habit-forming. It should be taken as prescribed. Do not mix it  with alcohol. Be careful with driving.Do not loose the  Prescription.  Do not overuse this medication. It is a controlled substance.  Follow up in one month.  Seek sooner medical attention if there is any worsening of symptoms or problems.              Follow-ups after your visit        Additional Services     PAIN MANAGEMENT REFERRAL       [place] provider [place] [facility] [place] [facility] -    Reason for Referral: Comprehensive Evaluation and Management    Please complete the following questions:    What is your diagnosis for the patient's pain? Patient has DJD     Do you have any specific questions for the pain specialist? Yes: patient has chronic low back pain and would you consider nerve ablation or JOHN as patient does want to take opiods on long term bases  She is not a surgical candidate due to age and multiple comorbidities    Are there any red flags that may impact the assessment or management of the patient? None    For any questions, contact the [facility] at [phone].     **ANY DIAGNOSTIC TESTS THAT ARE NOT IN [facility] SHOULD BE SENT TO THE PAIN CENTER**    REGARDING OPIOID  MEDICATIONS:  We will always address appropriateness of opioid pain medications, but we generally will not automatically take on a prescribing role. When we do take on prescribing of opioids for chronic pain, it is in collaboration with the referring physician for an intermediate period of time (months), with an expectation that the primary physician or provider will assume the prescribing role if medications are effective at stable doses with demonstrated compliance.  Therefore, please do not assume that your prescribing responsibilities end on the day of pain clinic consultation.  Is this agreeable to you? YES    Please be aware that coverage of these services is subject to the terms and limitations of your health insurance plan.  Call member services at your health plan with any benefit or coverage questions.      Please bring the following with you to your appointment:    (1) Any X-Rays, CTs or MRIs which have been performed.  Contact the facility where they were done to arrange for  prior to your scheduled appointment.    (2) List of current medications   (3) This referral request   (4) Any documents/labs given to you for this referral                  Your next 10 appointments already scheduled     Jan 09, 2018  1:30 PM CST   Return Visit with  Oncology Nurse   [facility] ([facility])    [facility]  [address]  [facility] [address]   [phone]            Jan 16, 2018  2:00 PM CST   Return Visit with Estiven Cali MD   [facility] ([facility])    [facility]  [address]  [facility] [address]   [phone]            Jan 23, 2018  1:15 PM CST   [place] with STONE TECH1   [facility] ([facility])    [address] [place] [address] [address]  [facility] [address] [phone]              Who to contact     If you have questions or need  "follow up information about today's clinic visit or your schedule please contact [facility] directly at [phone].  Normal or non-critical lab and imaging results will be communicated to you by MyChart, letter or phone within 4 business days after the clinic has received the results. If you do not hear from us within 7 days, please contact the clinic through MyChart or phone. If you have a critical or abnormal lab result, we will notify you by phone as soon as possible.  Submit refill requests through [place] or call your pharmacy and they will forward the refill request to us. Please allow 3 business days for your refill to be completed.          Additional Information About Your Visit        Care EveryWhere ID     This is your Care EveryWhere ID. This could be used by other organizations to access your [place] medical records  [id number]        Your Vitals Were     Pulse Temperature Height Last Period Pulse Oximetry Breastfeeding?    72 98  F (36.7  C) (Oral) 5' 6\" (1.676 m) (LMP Unknown) 98% No    BMI (Body Mass Index)                   26.31 kg/m2            Blood Pressure from Last 3 Encounters:   11/01/17 118/54   10/17/17 144/74   10/16/17 142/75    Weight from Last 3 Encounters:   11/01/17 163 lb (73.9 kg)   10/17/17 162 lb 9.6 oz (73.8 kg)   10/16/17 162 lb 1.6 oz (73.5 kg)              We Performed the Following     PAIN MANAGEMENT REFERRAL          Today's Medication Changes          These changes are accurate as of: 11/1/17 12:25 PM.  If you have any questions, ask your nurse or doctor.               Start taking these medicines.        Dose/Directions    blood glucose calibration solution   Commonly known as:  no brand specified   Used for:  Type 2 diabetes mellitus with diabetic nephropathy, without long-term current use of insulin (H)   Started by:  Neisha Esparza MD        Use to calibrate blood glucose monitor as directed. Please dispense brand of solution that works with " patient's current meter   Quantity:  1 each   Refills:  11       HYDROcodone-acetaminophen 5-325 MG per tablet   Commonly known as:  NORCO   Used for:  Herpes zoster without complication   Started by:  Neisha Esparza MD        Dose:  1 tablet   Take 1 tablet by mouth every 6 hours as needed for moderate to severe pain   Quantity:  90 tablet   Refills:  0         These medicines have changed or have updated prescriptions.        Dose/Directions    gabapentin 300 MG capsule   Commonly known as:  NEURONTIN   This may have changed:  how much to take   Used for:  Neuropathy   Changed by:  Neisha Esparza MD        Dose:  600 mg   Take 2 capsules (600 mg) by mouth 2 times daily   Refills:  0            Where to get your medicines      These medications were sent to [facility], [place] - [address]  [address], [facility] [address], [facility] [address]     Phone:  [phone]     blood glucose calibration solution    ranitidine 150 MG tablet         Some of these will need a paper prescription and others can be bought over the counter.  Ask your nurse if you have questions.     Bring a paper prescription for each of these medications     HYDROcodone-acetaminophen 5-325 MG per tablet                Primary Care Provider Office Phone # Fax #    Neisha Esparza [phone] [address]  [facility] [address]        Goals        General    start date: 4/17/14 I will weigh myself daily and if any weight gain or shortness of breath I will call the clinic. (pt-stated)     Notes - Note created  4/17/2014  3:59 PM by Margareth Pichardo, RN    As of today's date 4/17/2014 goal is met at 0 - 25%.   Goal Status:  Active        Equal Access to Services     [facility] : Hadfaraz Brady, jefe valentino, pamela craft. So [facility] [phone].    ATENCIÓN: jayda Lara  gupta disposición servicios gratuitos de asistencia lingüística. Maxim hunter [phone].    We comply with applicable federal civil rights laws and [place] laws. We do not discriminate on the basis of race, color, national origin, age, disability, sex, sexual orientation, or gender identity.            Thank you!     Thank you for choosing [facility]  for your care. Our goal is always to provide you with excellent care. Hearing back from our patients is one way we can continue to improve our services. Please take a few minutes to complete the written survey that you may receive in the mail after your visit with us. Thank you!             Your Updated Medication List - Protect others around you: Learn how to safely use, store and throw away your medicines at www.disposemymeds.org.          This list is accurate as of: 11/1/17 12:25 PM.  Always use your most recent med list.                   Brand Name Dispense Instructions for use Diagnosis    ACCU-CHEK SMARTVIEW test strip   Generic drug:  blood glucose monitoring     100 each    1 strip by In Vitro route 2 times daily Or as directed    Type 2 diabetes mellitus with diabetic nephropathy (H)       albuterol 108 (90 BASE) MCG/ACT Inhaler    PROAIR HFA/PROVENTIL HFA/VENTOLIN HFA    1 Inhaler    Inhale 2 puffs into the lungs every 4 hours as needed for shortness of breath / dyspnea or wheezing    COPD (chronic obstructive pulmonary disease) (H)       apixaban ANTICOAGULANT 2.5 MG tablet    ELIQUIS    60 tablet    Take 1 tablet (2.5 mg) by mouth 2 times daily    Atrial fibrillation, unspecified type (H)       betamethasone valerate 0.1 % cream    VALISONE    15 g    APPLY TOPICALLY 2 TIMES DAILY USE SPARINGLY    Rash       blood glucose calibration solution    no brand specified    1 each    Use to calibrate blood glucose monitor as directed. Please dispense brand of solution that works with patient's current meter    Type 2 diabetes mellitus with diabetic  nephropathy, without long-term current use of insulin (H)       blood glucose monitoring lancets     1 Box    USE TO TEST BLOOD SUGAR TWO TIMES A DAY OR AS DIRECTED    Type 2 diabetes mellitus with diabetic nephropathy (H)       COMPOUNDED NON-CONTROLLED SUBSTANCE - PHARMACY TO MIX COMPOUNDED MEDICATION    CMPD RX    42 suppository    Vaginal valium- 10 mg suppository    Vaginal pain, Pelvic floor tension, Urinary retention       fluticasone 50 MCG/ACT spray    FLONASE    16 mL    INHALE 1 TO 2 SPRAYS INTO BOTH NOSTRILS DAILY    Rhinitis, unspecified type       furosemide 20 MG tablet    LASIX    180 tablet    TAKE 1-2 TABS BY MOUTH ONCE DAILY. MAY REPEAT AFTER NOON IF NEEDED FOR WEIGHT GAIN/2+ EDEMA    Swelling       gabapentin 300 MG capsule    NEURONTIN     Take 2 capsules (600 mg) by mouth 2 times daily    Neuropathy       glipiZIDE 5 MG tablet    GLUCOTROL    135 tablet    TAKE 1 TABLET BY MOUTH EVERY MORNING AND 0.5 TABLET EVERY EVENING    Type 2 diabetes mellitus with diabetic nephropathy, without long-term current use of insulin (H)       HYDROcodone-acetaminophen 5-325 MG per tablet    NORCO    90 tablet    Take 1 tablet by mouth every 6 hours as needed for moderate to severe pain    Herpes zoster without complication       hydrocortisone 2.5 % cream    ANUSOL-HC    28 g    Place rectally 2 times daily as needed for hemorrhoids    External hemorrhoids       hydrOXYzine 25 MG capsule    VISTARIL    180 capsule    Take 1 capsule (25 mg) by mouth 2 times daily as needed for itching    Itching       lisinopril 10 MG tablet    PRINIVIL/ZESTRIL    90 tablet    Take 1 tablet (10 mg) by mouth daily    Essential hypertension       magnesium hydroxide 400 MG/5ML suspension    MILK OF MAGNESIA     Take 30 mLs by mouth At Bedtime        nystatin [id number] UNIT/GM Powd    MYCOSTATIN    60 g    Apply topically 2 times daily as needed    Rash       ondansetron 4 MG ODT tab    ZOFRAN ODT    30 tablet    Take 1 tablet (4 mg)  by mouth every 6 hours as needed for nausea    Nausea       order for DME     1 each    Equipment being ordered: AccuChek Smart View strips Patient tests twice daily.    Type II or unspecified type diabetes mellitus without mention of complication, not stated as uncontrolled       PRESERVISION/LUTEIN Caps      Take 1 capsule by mouth daily        ranitidine 150 MG tablet    ZANTAC    60 tablet    Take 1 tablet (150 mg) by mouth 2 times daily    Dyspepsia       simvastatin 10 MG tablet    ZOCOR    90 tablet    Take 1 tablet (10 mg) by mouth At Bedtime    Type 2 diabetes mellitus with diabetic nephropathy, without long-term current use of insulin (H)       tiotropium 18 MCG capsule    SPIRIVA HANDIHALER    30 capsule    Inhale 1 capsule (18 mcg) into the lungs daily    Chronic obstructive pulmonary disease, unspecified COPD type (H)       traMADol 50 MG tablet    ULTRAM    100 tablet    Take 1 tablet (50 mg) by mouth every 6 hours as needed for moderate pain    Intractable back pain

## 2017-11-10 ENCOUNTER — OFFICE VISIT (OUTPATIENT)
Dept: PALLIATIVE MEDICINE | Facility: CLINIC | Age: 82
End: 2017-11-10
Payer: MEDICARE

## 2017-11-10 VITALS
HEART RATE: 69 BPM | WEIGHT: 163 LBS | DIASTOLIC BLOOD PRESSURE: 83 MMHG | OXYGEN SATURATION: 99 % | BODY MASS INDEX: 26.31 KG/M2 | SYSTOLIC BLOOD PRESSURE: 171 MMHG

## 2017-11-10 DIAGNOSIS — B02.29 POST HERPETIC NEURALGIA: Primary | ICD-10-CM

## 2017-11-10 DIAGNOSIS — M47.817 LUMBOSACRAL SPONDYLOSIS WITHOUT MYELOPATHY: ICD-10-CM

## 2017-11-10 DIAGNOSIS — M79.18 MYOFASCIAL PAIN: ICD-10-CM

## 2017-11-10 PROCEDURE — 99205 OFFICE O/P NEW HI 60 MIN: CPT | Performed by: PAIN MEDICINE

## 2017-11-10 RX ORDER — LIDOCAINE/PRILOCAINE 2.5 %-2.5%
CREAM (GRAM) TOPICAL PRN
Qty: 30 G | Refills: 1 | Status: ON HOLD | OUTPATIENT
Start: 2017-11-10 | End: 2018-01-04

## 2017-11-10 RX ORDER — LIDOCAINE/PRILOCAINE 2.5 %-2.5%
CREAM (GRAM) TOPICAL PRN
Qty: 30 G | Refills: 1 | Status: SHIPPED | OUTPATIENT
Start: 2017-11-10 | End: 2017-12-28

## 2017-11-10 ASSESSMENT — PAIN SCALES - GENERAL: PAINLEVEL: SEVERE PAIN (7)

## 2017-11-10 NOTE — MR AVS SNAPSHOT
After Visit Summary   11/10/2017    Helene Gibbs    MRN: [id number]           Patient Information     Date Of Birth          1935        Visit Information        Provider Department      11/10/2017 3:00 PM Victor Hugo Bhardwaj MD [facility]        Today's Diagnoses     Post herpetic neuralgia    -  1    Myofascial pain        Lumbosacral spondylosis without myelopathy          Care Instructions    - Further procedures recommended:    - Will schedule Bilateral Lumbar MEDIAL BRANCH BLOCK     - consider Thoracic epidural for Post herpetic neuralgia- would have to stop Eliquis   - Medication Management:    - will order EMLA cream     - cosinder changing gabapentin to 3 times a day dosing at a lower dose.   - Physical Therapy: consider pain PHYSICAL THERAPY-Patient interested.  - Clinical Health Psychologist to address issues of relaxation, behavioral change, coping style, and other factors important to improvement: consider in the future   - Diagnostic Studies: May consider a bone scan given concern possible acute or subacute compression fracture  - Follow up: 2-4 weeks after procedure           ----------------------------------------------------------------  Nurse Triage line:  [phone]   Call this number with any questions or concerns. You may leave a detailed message anytime. Calls are typically returned Monday through Friday between 8 AM and 4:30 PM. We usually get back to you within 2 business days depending on the issue/request.       Medication refills:    For non-narcotic medications, call your pharmacy directly to request a refill. The pharmacy will contact the [facility] for authorization. Please allow 3-4 days for these refills to be processed.     Scheduling number: [phone].  Call this number to schedule or change appointments.    We believe regular attendance is key to your success in our program.    Any time you are unable to keep your  appointment we ask that you call us at least 24 hours in advance to let us know. This will allow us to offer the appointment time to another patient.               Follow-ups after your visit        Additional Services     PAIN INJECTION EVAL/TREAT/FOLLOW UP                 Your next 10 appointments already scheduled     Dec 05, 2017  1:30 PM CST   Office Visit with Neisha Esparza MD   [facility]    [address] [facility] [address] [phone]           Bring a current list of meds and any records pertaining to this visit. For Physicals, please bring immunization records and any forms needing to be filled out. Please arrive 10 minutes early to complete paperwork.            Jan 09, 2018  1:30 PM CST   Return Visit with  Oncology Nurse   [facility] ([facility])    [facility]  [address] [facility] [address]  [facility] [address]   [phone]            Jan 16, 2018  2:00 PM CST   Return Visit with Estiven Cali MD   [facility] ([facility])    [facility]  [address]  [facility] [address] [phone]            Jan 23, 2018  1:15 PM CST   [place] with STONE TECH1   [facility] ([facility])    [address]  [place] [address] [phone]              Who to contact     If you have questions or need follow up information about today's clinic visit or your schedule please contact [facility] directly at [phone].  Normal or non-critical lab and imaging results will be communicated to you by MyChart, letter or phone within 4 business days after the clinic has received the results. If you do not hear from us within 7 days, please contact the clinic through MyChart or phone. If you have a critical or abnormal lab result, we will notify you by phone as soon as  possible.  Submit refill requests through [employer] or call your pharmacy and they will forward the refill request to us. Please allow 3 business days for your refill to be completed.          Additional Information About Your Visit        Care EveryWhere ID     This is your Care EveryWhere ID. This could be used by other organizations to access your [facility] medical records  [id number]        Your Vitals Were     Pulse Last Period Pulse Oximetry BMI (Body Mass Index)          69 (LMP Unknown) 99% 26.31 kg/m2         Blood Pressure from Last 3 Encounters:   11/10/17 171/83   11/01/17 118/54   10/17/17 144/74    Weight from Last 3 Encounters:   11/10/17 73.9 kg (163 lb)   11/01/17 73.9 kg (163 lb)   10/17/17 73.8 kg (162 lb 9.6 oz)              We Performed the Following     PAIN INJECTION EVAL/TREAT/FOLLOW UP          Today's Medication Changes          These changes are accurate as of: 11/10/17  3:42 PM.  If you have any questions, ask your nurse or doctor.               Start taking these medicines.        Dose/Directions    lidocaine-prilocaine cream   Commonly known as:  EMLA   Used for:  Post herpetic neuralgia, Myofascial pain   Started by:  Victor Hugo Bhardwaj MD        Apply topically as needed for moderate pain   Quantity:  30 g   Refills:  1            Where to get your medicines      These medications were sent to [facility] [place], [place] - [address]  [facility] [address] [facility] [address]     Phone:  [phone]     lidocaine-prilocaine cream                Primary Care Provider Office Phone # Fax #    Neisha Esparza [phone] [address] [facility] [address]  [facility] [address]        Goals        General    start date: 4/17/14 I will weigh myself daily and if any weight gain or shortness of breath I will call the clinic. (pt-stated)     Notes - Note created  4/17/2014  3:59 PM by Margareth Pichardo RN    As of today's date  4/17/2014 goal is met at 0 - 25%.   Goal Status:  Active        Equal Access to Services     [facility]: Hadii sukhdeep mercer no Brady, washirleneda ludelvis, maribell castañeda, pamela mariin hayaakristina dohertytori farris laMaryjeramie . So [facility] [phone].    ATENCIÓN: Si habla español, tiene a gupta disposición servicios gratuitos de asistencia lingüística. Llame al [phone].    We comply with applicable federal civil rights laws and [place] laws. We do not discriminate on the basis of race, color, national origin, age, disability, sex, sexual orientation, or gender identity.            Thank you!     Thank you for choosing [facility]  for your care. Our goal is always to provide you with excellent care. Hearing back from our patients is one way we can continue to improve our services. Please take a few minutes to complete the written survey that you may receive in the mail after your visit with us. Thank you!             Your Updated Medication List - Protect others around you: Learn how to safely use, store and throw away your medicines at www.disposemymeds.org.          This list is accurate as of: 11/10/17  3:42 PM.  Always use your most recent med list.                   Brand Name Dispense Instructions for use Diagnosis    ACCU-CHEK SMARTVIEW test strip   Generic drug:  blood glucose monitoring     100 each    1 strip by In Vitro route 2 times daily Or as directed    Type 2 diabetes mellitus with diabetic nephropathy (H)       albuterol 108 (90 BASE) MCG/ACT Inhaler    PROAIR HFA/PROVENTIL HFA/VENTOLIN HFA    1 Inhaler    Inhale 2 puffs into the lungs every 4 hours as needed for shortness of breath / dyspnea or wheezing    COPD (chronic obstructive pulmonary disease) (H)       apixaban ANTICOAGULANT 2.5 MG tablet    ELIQUIS    60 tablet    Take 1 tablet (2.5 mg) by mouth 2 times daily    Atrial fibrillation, unspecified type (H)       betamethasone valerate 0.1 % cream    VALISONE    15 g    APPLY  TOPICALLY 2 TIMES DAILY USE SPARINGLY    Rash       blood glucose calibration solution    no brand specified    1 each    Use to calibrate blood glucose monitor as directed. Please dispense brand of solution that works with patient's current meter    Type 2 diabetes mellitus with diabetic nephropathy, without long-term current use of insulin (H)       blood glucose monitoring lancets     1 Box    USE TO TEST BLOOD SUGAR TWO TIMES A DAY OR AS DIRECTED    Type 2 diabetes mellitus with diabetic nephropathy (H)       COMPOUNDED NON-CONTROLLED SUBSTANCE - PHARMACY TO MIX COMPOUNDED MEDICATION    CMPD RX    42 suppository    Vaginal valium- 10 mg suppository    Vaginal pain, Pelvic floor tension, Urinary retention       fluticasone 50 MCG/ACT spray    FLONASE    16 mL    INHALE 1 TO 2 SPRAYS INTO BOTH NOSTRILS DAILY    Rhinitis, unspecified type       furosemide 20 MG tablet    LASIX    180 tablet    TAKE 1-2 TABS BY MOUTH ONCE DAILY. MAY REPEAT AFTER NOON IF NEEDED FOR WEIGHT GAIN/2+ EDEMA    Swelling       gabapentin 300 MG capsule    NEURONTIN     Take 2 capsules (600 mg) by mouth 2 times daily    Neuropathy       glipiZIDE 5 MG tablet    GLUCOTROL    135 tablet    TAKE 1 TABLET BY MOUTH EVERY MORNING AND 0.5 TABLET EVERY EVENING    Type 2 diabetes mellitus with diabetic nephropathy, without long-term current use of insulin (H)       HYDROcodone-acetaminophen 5-325 MG per tablet    NORCO    90 tablet    Take 1 tablet by mouth every 6 hours as needed for moderate to severe pain    Intractable back pain, Scoliosis of lumbar spine, unspecified scoliosis type, Chronic midline low back pain with sciatica, sciatica laterality unspecified       hydrocortisone 2.5 % cream    ANUSOL-HC    28 g    Place rectally 2 times daily as needed for hemorrhoids    External hemorrhoids       hydrOXYzine 25 MG capsule    VISTARIL    180 capsule    Take 1 capsule (25 mg) by mouth 2 times daily as needed for itching    Itching        lidocaine-prilocaine cream    EMLA    30 g    Apply topically as needed for moderate pain    Post herpetic neuralgia, Myofascial pain       lisinopril 10 MG tablet    PRINIVIL/ZESTRIL    90 tablet    Take 1 tablet (10 mg) by mouth daily    Essential hypertension       magnesium hydroxide 400 MG/5ML suspension    MILK OF MAGNESIA     Take 30 mLs by mouth At Bedtime        nystatin 925005 UNIT/GM Powd    MYCOSTATIN    60 g    Apply topically 2 times daily as needed    Rash       ondansetron 4 MG ODT tab    ZOFRAN ODT    30 tablet    Take 1 tablet (4 mg) by mouth every 6 hours as needed for nausea    Nausea       order for DME     1 each    Equipment being ordered: AccuChek Smart View strips Patient tests twice daily.    Type II or unspecified type diabetes mellitus without mention of complication, not stated as uncontrolled       PRESERVISION/LUTEIN Caps      Take 1 capsule by mouth daily        ranitidine 150 MG tablet    ZANTAC    60 tablet    Take 1 tablet (150 mg) by mouth 2 times daily    Dyspepsia       simvastatin 10 MG tablet    ZOCOR    90 tablet    Take 1 tablet (10 mg) by mouth At Bedtime    Type 2 diabetes mellitus with diabetic nephropathy, without long-term current use of insulin (H)       tiotropium 18 MCG capsule    SPIRIVA HANDIHALER    30 capsule    Inhale 1 capsule (18 mcg) into the lungs daily    Chronic obstructive pulmonary disease, unspecified COPD type (H)       traMADol 50 MG tablet    ULTRAM    100 tablet    Take 1 tablet (50 mg) by mouth every 6 hours as needed for moderate pain    Intractable back pain

## 2017-11-10 NOTE — PROGRESS NOTES
[facility] Consultation    Date of visit: 11/10/2017    Reason for consultation:    Primary Care Provider is Neisha Esparza  Pain medications are being prescribed by n/a.    Please see the [facility] health questionnaire which the patient completed and reviewed with me in detail.    Chief Complaint:    Chief Complaint   Patient presents with     Pain     new pain management evaluation       Pain history: Of note pt has a hx of compression fx w/ a vertebroplasty approx 5 yrs ago   Helene Gibbs is a 82 year old female who first started having problems with pain >30 yrs ago. The pt reports axial LBP. The pain radiates across her back. She denies any radiation down her legs. But does have chronic peripheral neuropathy( numbness in her feet). The pain is worse when going from a seated to standing position. The pain is worse w/ extension and rotation. The pain is specifically terrible when taking a BM, Heat helps. Leaning forward helps a little. She reports some benefit with gabapentin(600mg BID) and prn norco. She denies any burning/numbness going down her legs. She denies any overt progressive weakness. She denies any incontinence.    Of note pt was recently dx w/ shingles 9/29. She denies ever having rash. But she feels her reg pain was intensified and radiated further across her back to her abd.      Pain rating: intensity  Averages 7/10 on a 0-10 scale.    Any bowel or bladder incontinence: none    Current treatments include:  norco   bryn    Previous medication treatments included:      Other treatments have included:  PT: no relief   Chiropractic: no  Acupuncture: no help  TENs Unit: no  Injections:  vertebroplasty        Past Medical History:  Past Medical History:   Diagnosis Date     Anemia      Ankle arthritis      Arthritis      Back pain      Bell's palsy 9/08    left     Breast CA (H) 2004-    left lumpectomy, radiation, -recurrence     Colon polyps      colonoscopy-9/12-next due in 9/13     Congestive heart failure (H)      COPD (chronic obstructive pulmonary disease) (H)      Coronary artery disease      DM (diabetes mellitus) (H)      GERD (gastroesophageal reflux disease)      Hyperlipidemia      Hypertension      Lumbar spinal stenosis     use cane     Obesity      Osteoporosis      Other chronic pain      Pacemaker      Permanent atrial fibrillation (H) 8/2008    S/P AV node ablation and pacemaker 10-25-12       Pleural effusion      Pulmonary hypertension      Rectal stenosis      Tobacco abuse     current     Past Surgical History:  Past Surgical History:   Procedure Laterality Date     ARTHROSCOPY SHOULDER RT/LT  1999, 2004    Bilateral, right then left     BONE MARROW BIOPSY, BONE SPECIMEN, NEEDLE/TROCAR N/A 8/29/2017    Procedure: BIOPSY BONE MARROW;  BONE MARROW BIOPSY;  Surgeon: Marino Cohen MD;  Location:  GI     C DEXA, BONE DENSITY, AXIAL SKEL       C MAMMOGRAM, SCREENING  1/2009     COLONOSCOPY N/A 10/7/2016    Procedure: COMBINED COLONOSCOPY, SINGLE OR MULTIPLE BIOPSY/POLYPECTOMY BY BIOPSY;  Surgeon: Cassandra Mccord MD;  Location:  GI     ESOPHAGOSCOPY, GASTROSCOPY, DUODENOSCOPY (EGD), COMBINED N/A 8/10/2017    Procedure: COMBINED ESOPHAGOSCOPY, GASTROSCOPY, DUODENOSCOPY (EGD), BIOPSY SINGLE OR MULTIPLE;  gastroscopy;  Surgeon: Helene Bustamante MD;  Location:  GI     H ABLATION AV NODE  10/2012     HC COLONOSCOPY THRU STOMA, DIAGNOSTIC  ? 2005     IMPLANT PACEMAKER  10/2012    [place] [id number], [id number]     JOINT REPLACEMTN, KNEE RT/LT      Joint Replacement knee bilateral     LAPAROSCOPIC CHOLECYSTECTOMY WITH CHOLANGIOGRAMS N/A 12/14/2015    Procedure: LAPAROSCOPIC CHOLECYSTECTOMY WITH CHOLANGIOGRAMS;  Surgeon: Ervin Amos MD;  Location:  OR     LUMPECTOMY BREAST      Left-2004     PHACOEMULSIFICATION CLEAR CORNEA WITH STANDARD INTRAOCULAR LENS IMPLANT Left 7/16/2015    Procedure: PHACOEMULSIFICATION CLEAR CORNEA  WITH STANDARD INTRAOCULAR LENS IMPLANT;  Surgeon: Sai Obregon MD;  Location: [facility]     PHACOEMULSIFICATION CLEAR CORNEA WITH TORIC INTRAOCULAR LENS IMPLANT Right 5/9/2017    Procedure: PHACOEMULSIFICATION CLEAR CORNEA WITH TORIC INTRAOCULAR LENS IMPLANT;  RIGHT EYE PHACOEMULSIFICATION CLEAR CORNEA WITH TORIC INTRAOCULAR LENS IMPLANT ;  Surgeon: Duc Richmond MD;  Location: [facility]     Medications:  Current Outpatient Prescriptions   Medication Sig Dispense Refill     lidocaine-prilocaine (EMLA) cream Apply topically as needed for moderate pain 30 g 1     gabapentin (NEURONTIN) 300 MG capsule Take 2 capsules (600 mg) by mouth 2 times daily       ranitidine (ZANTAC) 150 MG tablet Take 1 tablet (150 mg) by mouth 2 times daily 60 tablet 3     blood glucose calibration (NO BRAND SPECIFIED) solution Use to calibrate blood glucose monitor as directed. Please dispense brand of solution that works with patient's current meter 1 each 11     HYDROcodone-acetaminophen (NORCO) 5-325 MG per tablet Take 1 tablet by mouth every 6 hours as needed for moderate to severe pain 90 tablet 0     ondansetron (ZOFRAN ODT) 4 MG ODT tab Take 1 tablet (4 mg) by mouth every 6 hours as needed for nausea 30 tablet 1     apixaban ANTICOAGULANT (ELIQUIS) 2.5 MG tablet Take 1 tablet (2.5 mg) by mouth 2 times daily 60 tablet 3     betamethasone valerate (VALISONE) 0.1 % cream APPLY TOPICALLY 2 TIMES DAILY USE SPARINGLY 15 g 1     glipiZIDE (GLUCOTROL) 5 MG tablet TAKE 1 TABLET BY MOUTH EVERY MORNING AND 0.5 TABLET EVERY EVENING 135 tablet 1     lisinopril (PRINIVIL/ZESTRIL) 10 MG tablet Take 1 tablet (10 mg) by mouth daily 90 tablet 1     furosemide (LASIX) 20 MG tablet TAKE 1-2 TABS BY MOUTH ONCE DAILY. MAY REPEAT AFTER NOON IF NEEDED FOR WEIGHT GAIN/2+ EDEMA 180 tablet 1     tiotropium (SPIRIVA HANDIHALER) 18 MCG capsule Inhale 1 capsule (18 mcg) into the lungs daily 30 capsule 3     traMADol (ULTRAM) 50 MG tablet Take 1 tablet (50  mg) by mouth every 6 hours as needed for moderate pain 100 tablet 1     simvastatin (ZOCOR) 10 MG tablet Take 1 tablet (10 mg) by mouth At Bedtime 90 tablet 1     hydrOXYzine (VISTARIL) 25 MG capsule Take 1 capsule (25 mg) by mouth 2 times daily as needed for itching 180 capsule 1     fluticasone (FLONASE) 50 MCG/ACT spray INHALE 1 TO 2 SPRAYS INTO BOTH NOSTRILS DAILY 16 mL 3     COMPOUND (CMPD RX) - PHARMACY TO MIX COMPOUNDED MEDICATION Vaginal valium- 10 mg suppository 42 suppository 3     hydrocortisone (ANUSOL-HC) 2.5 % rectal cream Place rectally 2 times daily as needed for hemorrhoids 28 g 1     ACCU-CHEK SMARTVIEW test strip 1 strip by In Vitro route 2 times daily Or as directed 100 each 1     blood glucose monitoring (ACCU-CHEK FASTCLIX) lancets USE TO TEST BLOOD SUGAR TWO TIMES A DAY OR AS DIRECTED 1 Box 11     Multiple Vitamins-Minerals (PRESERVISION/LUTEIN) CAPS Take 1 capsule by mouth daily        nystatin (MYCOSTATIN) 219023 UNIT/GM POWD Apply topically 2 times daily as needed 60 g 1     ORDER FOR DME, SET TO LOCAL PRINT, Equipment being ordered: AccuChek Smart View strips  Patient tests twice daily. 1 each 11     albuterol (PROAIR HFA, PROVENTIL HFA, VENTOLIN HFA) 108 (90 BASE) MCG/ACT inhaler Inhale 2 puffs into the lungs every 4 hours as needed for shortness of breath / dyspnea or wheezing 1 Inhaler 5     magnesium hydroxide (MILK OF MAGNESIA) 400 MG/5ML suspension Take 30 mLs by mouth At Bedtime        Allergies:     Allergies   Allergen Reactions     Penicillins Hives     Ambien [Zolpidem Tartrate]      Hallucinations and fell out of bed at night.     Definity      Caused a syncopal episode.     Sulfa Drugs Itching     Cymbalta Rash     Fluconazole Rash     Social History:  Home situation: alone  Occupation/Schooling: retired  Tobacco use: never  Alcohol use: never  Drug use: never  History of chemical dependency treatment: never    Family history:  Family History   Problem Relation Age of Onset      Hypertension Mother      DIABETES Mother       at 83 yrs     C.A.D. Father       age 70s     Breast Cancer No family hx of      Colon Cancer No family hx of      Family history of headaches: no    Review of Systems:  Skin: negative  Eyes: negative  Ears/Nose/Throat: negative  Respiratory: No shortness of breath, dyspnea on exertion, cough, or hemoptysis  Cardiovascular: negative  Gastrointestinal: negative  Genitourinary: negative  Musculoskeletal: negative  Neurologic: negative  Psychiatric: negative  Hematologic/Lymphatic/Immunologic: negative  Endocrine: negative    Physical Exam:  Vitals:    11/10/17 1441   BP: 171/83   Pulse: 69   SpO2: 99%   Weight: 73.9 kg (163 lb)     Exam: Uses walker, slumped over  Constitutional: healthy, alert and no distress  Head: normocephalic. Atraumatic.   Eyes: no redness or jaundice noted   ENT: oropharnx normal.  MMM.  Neck supple.    Cardiovascular: neg jvd, + 1 edema  Respiratory: clear   Gastrointestinal: soft, non-tender, normoactive bowel sounds   Skin: no suspicious lesions or rashes  Psychiatric: mentation appears normal and affect normal/bright    Musculoskeletal exam:  Cervical spine: ROM slightly decreased, no pain        Thoracic spine:  Neg allodynia, neg tenderness over axial thoracic spinous process. + tenderness over thoracic ribs approx T7-10  Lumbar spine:     ROM: limited 2/2 to pain    Myofascial tenderness:  + lat labp    Cervantes's: + bilat               Straight leg exam: neg   ALYCIA/FADIR: neg              SI: neg   Greater trochanteric bursa: neg  Neurologic exam:  CN:  Cranial nerves 2-12 are normal  Motor:  5/5 UE and LE strength  Reflexes:     Biceps:     +2   Brachioradialis   2+     Triceps:  +2   Patella:  +2   Achilles:  +2    Sensory:  (upper and lower extremities):   Light touch: normal    Allodynia: absent    Dysethesia: absent    Hyperalgesia: absent     Diagnostic tests:  CT   L l5-S1 foraminal stenosis   R L4-5  Xray thoracic  t9-12  compression deformity acuity unknown  Assessment/Plan:  Helene Gibbs is a 82 year old female who presents with the complaints of axial LBP and lat thoracic pain and pruritis.  Helene was seen today for pain.    Diagnoses and all orders for this visit:    Post herpetic neuralgia  -     lidocaine-prilocaine (EMLA) cream; Apply topically as needed for moderate pain    Myofascial pain  -     lidocaine-prilocaine (EMLA) cream; Apply topically as needed for moderate pain    Lumbosacral spondylosis without myelopathy  -     PAIN INJECTION EVAL/TREAT/FOLLOW UP    - Further procedures recommended:    - Will schedule Bilateral Lumbar MEDIAL BRANCH BLOCK     - consider Thoracic epidural for Post herpetic neuralgia- would have to stop Eliquis. Possible even the pt had no rash. Pt currently has no signs of allodynia, but does have dermatomal uncomfortable sensation.   - Medication Management:    - will order EMLA cream     - cosinder changing gabapentin to 3 times a day dosing at a lower dose.   - Physical Therapy: consider pain PHYSICAL THERAPY-Patient interested.  - Clinical Health Psychologist to address issues of relaxation, behavioral change, coping style, and other factors important to improvement: consider in the future   - Diagnostic Studies: May consider a bone scan given concern possible acute or subacute compression fracture  - Follow up: 2-4 weeks after procedure      Total time spent was 60 minutes, and more than 50% of face to face time was spent counseling and/or coordination of care regarding principles of multidisciplinary care and medication management axial LBP and lat thoracic pain and pruritis.    Victor Hugo Bhardwaj MD  [facility]

## 2017-11-10 NOTE — PATIENT INSTRUCTIONS
- Further procedures recommended:    - Will schedule Bilateral Lumbar MEDIAL BRANCH BLOCK     - consider Thoracic epidural for Post herpetic neuralgia- would have to stop Eliquis   - Medication Management:    - will order EMLA cream     - sara changing gabapentin to 3 times a day dosing at a lower dose.   - Physical Therapy: consider pain PHYSICAL THERAPY-Patient interested.  - Clinical Health Psychologist to address issues of relaxation, behavioral change, coping style, and other factors important to improvement: consider in the future   - Diagnostic Studies: May consider a bone scan given concern possible acute or subacute compression fracture  - Follow up: 2-4 weeks after procedure           ----------------------------------------------------------------  Nurse Triage line:  [phone]   Call this number with any questions or concerns. You may leave a detailed message anytime. Calls are typically returned Monday through Friday between 8 AM and 4:30 PM. We usually get back to you within 2 business days depending on the issue/request.       Medication refills:    For non-narcotic medications, call your pharmacy directly to request a refill. The pharmacy will contact the [facility] for authorization. Please allow 3-4 days for these refills to be processed.     Scheduling number: [phone].  Call this number to schedule or change appointments.    We believe regular attendance is key to your success in our program.    Any time you are unable to keep your appointment we ask that you call us at least 24 hours in advance to let us know. This will allow us to offer the appointment time to another patient.

## 2017-11-10 NOTE — NURSING NOTE
"Chief Complaint   Patient presents with     Pain     new pain management evaluation       Initial /83  Pulse 69  Wt 73.9 kg (163 lb)  LMP  (LMP Unknown)  SpO2 99%  BMI 26.31 kg/m2 Estimated body mass index is 26.31 kg/(m^2) as calculated from the following:    Height as of 11/1/17: 1.676 m (5' 6\").    Weight as of this encounter: 73.9 kg (163 lb).    "

## 2017-11-13 ENCOUNTER — TELEPHONE (OUTPATIENT)
Dept: FAMILY MEDICINE | Facility: CLINIC | Age: 82
End: 2017-11-13

## 2017-11-13 ENCOUNTER — TELEPHONE (OUTPATIENT)
Dept: PALLIATIVE MEDICINE | Facility: CLINIC | Age: 82
End: 2017-11-13

## 2017-11-13 DIAGNOSIS — R30.0 DYSURIA: Primary | ICD-10-CM

## 2017-11-13 ASSESSMENT — PATIENT HEALTH QUESTIONNAIRE - PHQ9: SUM OF ALL RESPONSES TO PHQ QUESTIONS 1-9: 9

## 2017-11-13 NOTE — TELEPHONE ENCOUNTER
Reason for Call:  Home Health Care    Tati with [facility] called regarding (reason for call): Order  To bring in a Urine Sample    Please call her back ASAP    Pt Provider:     Phone Number Homecare Nurse can be reached at: [phone]    Can we leave a detailed message on this number? YES        Call taken on 11/13/2017 at 1:43 PM by Josue Gomes

## 2017-11-13 NOTE — TELEPHONE ENCOUNTER
Spoke with Tati BLACKBURN  Pt is complaining of burning with urination, darker in color, some incontinence, normal temp 98.6F-not chilled, urine isn't cloudy, has fullness of bladder, no visible blood in urine   Frequency with urinating but recent increase in Lasix   Pain across low back bilaterally continues - did see pain specialist for this recently     Home care nurse is requesting an order for UA/UC -she would bring pt's urine sample in to our lab in the clinic     Shawanda REID RN

## 2017-11-14 ENCOUNTER — TELEPHONE (OUTPATIENT)
Dept: FAMILY MEDICINE | Facility: CLINIC | Age: 82
End: 2017-11-14

## 2017-11-14 DIAGNOSIS — R30.0 DYSURIA: ICD-10-CM

## 2017-11-14 LAB
ALBUMIN UR-MCNC: NEGATIVE MG/DL
APPEARANCE UR: CLEAR
BILIRUB UR QL STRIP: NEGATIVE
COLOR UR AUTO: YELLOW
GLUCOSE UR STRIP-MCNC: NEGATIVE MG/DL
HGB UR QL STRIP: NEGATIVE
KETONES UR STRIP-MCNC: NEGATIVE MG/DL
LEUKOCYTE ESTERASE UR QL STRIP: NEGATIVE
NITRATE UR QL: NEGATIVE
NON-SQ EPI CELLS #/AREA URNS LPF: ABNORMAL /LPF
PH UR STRIP: 7.5 PH (ref 5–7)
RBC #/AREA URNS AUTO: ABNORMAL /HPF
SOURCE: ABNORMAL
SP GR UR STRIP: 1.01 (ref 1–1.03)
UROBILINOGEN UR STRIP-ACNC: 0.2 EU/DL (ref 0.2–1)
WBC #/AREA URNS AUTO: ABNORMAL /HPF

## 2017-11-14 PROCEDURE — 81001 URINALYSIS AUTO W/SCOPE: CPT | Performed by: INTERNAL MEDICINE

## 2017-11-14 NOTE — PROGRESS NOTES
Please notify patient by sending following letter with copy of test results      Shakira Narayan,    This is to inform you regarding your test result.    Your urine test is negative for any  infection    Sincerely,      Dr.Nasima Vilma MD,FACP

## 2017-11-14 NOTE — TELEPHONE ENCOUNTER
I called and spoke with Helene. She is willing to try laying on her stomach for a procedure. Let her know we would need her to be able to do this and she understands the need. Her concern was she doesn't lay on her stomach for anything so doesn't really know if she can or not. We reviewed the padded procedure table - she was picturing a hard table as she's experienced in a regular x-ray procedure in the past.     She will call us back to schedule. She is concerned she has a bladder infection so her home health care nurse is taking in a a sample for testing today. Let her know we would need her to be infection free and off antibiotics for one week prior to procedure. She will stay in contact and schedule when she is able.    Veronika Durant, MSN, RN-BC  Care Coordinator  [facility]

## 2017-11-14 NOTE — TELEPHONE ENCOUNTER
Reason for Call:  Other I    Detailed comments: Tati Coburn RN brought in drop off for pt.   Tati said to contact her if any medication to be prescribed. She   Will  for pt.    Ph. [phone]    Best Time: Anytime    Can we leave a detailed message on this number? YES    Call taken on 11/14/2017 at 11:50 AM by Qiana Hwang

## 2017-11-14 NOTE — TELEPHONE ENCOUNTER
Urine test is negative for any infection.  Let patient and her RN know about it .  Dr.Nasima Vilma MD

## 2017-11-14 NOTE — TELEPHONE ENCOUNTER
Received call from patient stating that she does not have a bladder infection and that she wanted to go ahead and schedule. Patient scheduled on 12/4 with Dr. Bhardwaj.    Cassandra Mandel    [facility]

## 2017-11-14 NOTE — LETTER
[facility]  [address] [facility]  [address]  [place]  [address]  Tel: [phone]    November 15, 2017    Helene BURGESS Sai  [address]   [facility] [address]        Dear MsMaryam Gaticaon,    This is to inform you regarding your test result.    Your urine test is negative for any  infection.     If you have any further questions or problems, please contact our office.      Sincerely,    Neisha Esparza MD/ Asia REID CMA  Results for orders placed or performed in visit on 11/14/17   UA with Microscopic reflex to Culture   Result Value Ref Range    Color Urine Yellow     Appearance Urine Clear     Glucose Urine Negative NEG^Negative mg/dL    Bilirubin Urine Negative NEG^Negative    Ketones Urine Negative NEG^Negative mg/dL    Specific Gravity Urine 1.015 1.003 - 1.035    pH Urine 7.5 (H) 5.0 - 7.0 pH    Protein Albumin Urine Negative NEG^Negative mg/dL    Urobilinogen Urine 0.2 0.2 - 1.0 EU/dL    Nitrite Urine Negative NEG^Negative    Blood Urine Negative NEG^Negative    Leukocyte Esterase Urine Negative NEG^Negative    Source Midstream Urine     WBC Urine O - 2 OTO2^O - 2 /HPF    RBC Urine O - 2 OTO2^O - 2 /HPF    Squamous Epithelial /LPF Urine Few FEW^Few /LPF     *Note: Due to a large number of results and/or encounters for the requested time period, some results have not been displayed. A complete set of results can be found in Results Review.               Enclosure: Lab Results

## 2017-11-21 ENCOUNTER — TELEPHONE (OUTPATIENT)
Dept: FAMILY MEDICINE | Facility: CLINIC | Age: 82
End: 2017-11-21

## 2017-11-21 DIAGNOSIS — G62.9 NEUROPATHY: ICD-10-CM

## 2017-11-21 RX ORDER — GABAPENTIN 300 MG/1
600 CAPSULE ORAL 3 TIMES DAILY
Qty: 180 CAPSULE | Refills: 3 | Status: ON HOLD | OUTPATIENT
Start: 2017-11-21 | End: 2018-01-18

## 2017-11-21 NOTE — TELEPHONE ENCOUNTER
TO PCP - Spoke with patient:   Pt does want to increase her Gabapentin to 600mg TID   Requesting a month supply with refills of Gabapentin - Rx and pharmacy pended  She will call for refill of norco when she runs out   Is currently using Lidocaine patches OTC and they have been helpful  OV scheduled for 11/30/17     Shawanda REID RN

## 2017-11-21 NOTE — TELEPHONE ENCOUNTER
Tried calling pt to inform of Rx sent - no answer and unable to LM.  Per earlier discussion with pt, she was going to go to the pharmacy this afternoon to  Rx.   No further action required.     Shawanda REID RN

## 2017-11-21 NOTE — TELEPHONE ENCOUNTER
Reason for call:  Patient reporting a symptom    Symptom or request: excruciating back pain.  Patient believes the pain is continuation of her shingles.    Duration (how long have symptoms been present): over a week    Have you been treated for this before? Yes    Additional comments: please call to advise    Phone Number patient can be reached at:  Home number on file [phone] (home)    Best Time:  asap    Can we leave a detailed message on this number:  YES    Call taken on 11/21/2017 at 9:33 AM by Alysa Carrizales  .

## 2017-11-21 NOTE — TELEPHONE ENCOUNTER
No need for XR as patient had that not too long ago.  We can increase her gabapentin to 600 mg tid if she agrees  I can prescribe norco when she runs out.  She can use salon pas patches(OTC) or Lidocare patches OTC   She can make appointment to see me if she wants.  Dr.Nasima Vilma MD

## 2017-11-21 NOTE — TELEPHONE ENCOUNTER
Pt calling about ongoing backpain, reports as shingles related, but also spine related.  Pt has procedure scheduled with Pain clinic on 12/4/17 for block.  Pt reports pain clinic said they would not prescribe any medication/nor take this over.    Pt currently taking gabapentin 600 2 times daily, also has norco 90 tab rx 1 tab q6h prn.  Pt states gabapentin is not helping at all with pain.    Discussed neuro pain/pain management and PCP may want to try increasing Gabapentin.  Pt asking about getting XR of spine.    PCP would you want to make any changes to medications currently, or see pt in office? Could schedule on Friday in [address].   Pended pharmacy if needed.  Brandi Hinojosa RN

## 2017-11-24 DIAGNOSIS — M54.40 CHRONIC MIDLINE LOW BACK PAIN WITH SCIATICA, SCIATICA LATERALITY UNSPECIFIED: ICD-10-CM

## 2017-11-24 DIAGNOSIS — M41.9 SCOLIOSIS OF LUMBAR SPINE, UNSPECIFIED SCOLIOSIS TYPE: ICD-10-CM

## 2017-11-24 DIAGNOSIS — G89.29 CHRONIC MIDLINE LOW BACK PAIN WITH SCIATICA, SCIATICA LATERALITY UNSPECIFIED: ICD-10-CM

## 2017-11-24 DIAGNOSIS — M54.9 INTRACTABLE BACK PAIN: ICD-10-CM

## 2017-11-24 RX ORDER — HYDROCODONE BITARTRATE AND ACETAMINOPHEN 5; 325 MG/1; MG/1
1 TABLET ORAL EVERY 6 HOURS PRN
Qty: 90 TABLET | Refills: 0 | Status: SHIPPED | OUTPATIENT
Start: 2017-11-24 | End: 2017-12-05

## 2017-11-24 NOTE — TELEPHONE ENCOUNTER
Controlled Substance Refill Request for   HYDROcodone-acetaminophen (NORCO) 5-325 MG per tablet 90 tablet 0 11/1/2017  No   Sig: Take 1 tablet by mouth every 6 hours as needed for moderate to severe pain     Patient requesting for pain caused by Shingles.     Current  Associated Diagnoses   Intractable back pain [M54.9]  - Primary       Scoliosis of lumbar spine, unspecified scoliosis type [M41.9]       Chronic midline low back pain with sciatica, sciatica laterality unspecified [M54.40, G89.29]              Problem List Complete:  Yes   Partial copy =   Neisha Esparza MD at 11/1/2017 11:30 AM      Status: Signed               SUBJECTIVE:   Helene iGbbs is a 82 year old female who presents to clinic today for the following health issues:        Patient is complaining being about her chronic pain  She says she cannot live like this  She has pain in her lower back which is chronic  She uses more Norco  then prescribed due to the amount of pain  Patient says she doesn't know what triggered it  She is having pain in lower back it's like electric shock  She has pain around her thoracic area where she had shingles  She doesn't believe that she had shingles           checked in past 6 months?  No, route to RN  Last [facility] website verification:  February 1, 2017

## 2017-11-24 NOTE — TELEPHONE ENCOUNTER
Bring to [place] in our Building, Call once approved  (see Meds & Orders)    Her Shingles are hurting her and is painful     Thank you

## 2017-12-02 ENCOUNTER — OFFICE VISIT (OUTPATIENT)
Dept: URGENT CARE | Facility: URGENT CARE | Age: 82
End: 2017-12-02
Payer: MEDICARE

## 2017-12-02 ENCOUNTER — HOSPITAL ENCOUNTER (EMERGENCY)
Facility: CLINIC | Age: 82
Discharge: HOME | End: 2017-12-02
Attending: EMERGENCY MEDICINE | Admitting: EMERGENCY MEDICINE
Payer: MEDICARE

## 2017-12-02 ENCOUNTER — APPOINTMENT (OUTPATIENT)
Dept: ULTRASOUND IMAGING | Facility: CLINIC | Age: 82
End: 2017-12-02
Attending: EMERGENCY MEDICINE
Payer: MEDICARE

## 2017-12-02 VITALS
SYSTOLIC BLOOD PRESSURE: 161 MMHG | HEART RATE: 70 BPM | RESPIRATION RATE: 16 BRPM | OXYGEN SATURATION: 100 % | TEMPERATURE: 97.8 F | DIASTOLIC BLOOD PRESSURE: 93 MMHG | HEIGHT: 66 IN

## 2017-12-02 VITALS
DIASTOLIC BLOOD PRESSURE: 74 MMHG | OXYGEN SATURATION: 96 % | TEMPERATURE: 96.7 F | SYSTOLIC BLOOD PRESSURE: 138 MMHG | HEART RATE: 72 BPM

## 2017-12-02 DIAGNOSIS — I10 HYPERTENSION GOAL BP (BLOOD PRESSURE) < 140/90: ICD-10-CM

## 2017-12-02 DIAGNOSIS — I87.2 VENOUS STASIS DERMATITIS, UNSPECIFIED LATERALITY: ICD-10-CM

## 2017-12-02 DIAGNOSIS — I50.32 CHRONIC DIASTOLIC CONGESTIVE HEART FAILURE (H): ICD-10-CM

## 2017-12-02 DIAGNOSIS — N18.30 CKD (CHRONIC KIDNEY DISEASE) STAGE 3, GFR 30-59 ML/MIN (H): ICD-10-CM

## 2017-12-02 DIAGNOSIS — M79.661 PAIN AND SWELLING OF LOWER LEG, RIGHT: Primary | ICD-10-CM

## 2017-12-02 DIAGNOSIS — E11.21 TYPE 2 DIABETES MELLITUS WITH DIABETIC NEPHROPATHY, WITHOUT LONG-TERM CURRENT USE OF INSULIN (H): ICD-10-CM

## 2017-12-02 DIAGNOSIS — M79.89 LEG SWELLING: ICD-10-CM

## 2017-12-02 DIAGNOSIS — M79.89 PAIN AND SWELLING OF LOWER LEG, RIGHT: Primary | ICD-10-CM

## 2017-12-02 PROBLEM — I48.91 ATRIAL FIBRILLATION, UNSPECIFIED TYPE (H): Status: RESOLVED | Noted: 2017-09-05 | Resolved: 2017-12-02

## 2017-12-02 LAB
ALBUMIN SERPL-MCNC: 3.4 G/DL (ref 3.4–5)
ALP SERPL-CCNC: 141 U/L (ref 40–150)
ALT SERPL W P-5'-P-CCNC: 17 U/L (ref 0–50)
ANION GAP SERPL CALCULATED.3IONS-SCNC: 3 MMOL/L (ref 3–14)
AST SERPL W P-5'-P-CCNC: 12 U/L (ref 0–45)
BASOPHILS # BLD AUTO: 0 10E9/L (ref 0–0.2)
BASOPHILS NFR BLD AUTO: 0.5 %
BILIRUB SERPL-MCNC: 0.3 MG/DL (ref 0.2–1.3)
BUN SERPL-MCNC: 27 MG/DL (ref 7–30)
CALCIUM SERPL-MCNC: 10.2 MG/DL (ref 8.5–10.1)
CHLORIDE SERPL-SCNC: 100 MMOL/L (ref 94–109)
CO2 SERPL-SCNC: 35 MMOL/L (ref 20–32)
CREAT SERPL-MCNC: 1.48 MG/DL (ref 0.52–1.04)
DIFFERENTIAL METHOD BLD: ABNORMAL
EOSINOPHIL # BLD AUTO: 0.2 10E9/L (ref 0–0.7)
EOSINOPHIL NFR BLD AUTO: 3.2 %
ERYTHROCYTE [DISTWIDTH] IN BLOOD BY AUTOMATED COUNT: 19 % (ref 10–15)
GFR SERPL CREATININE-BSD FRML MDRD: 34 ML/MIN/1.7M2
GLUCOSE SERPL-MCNC: 113 MG/DL (ref 70–99)
HCT VFR BLD AUTO: 39.4 % (ref 35–47)
HGB BLD-MCNC: 12.8 G/DL (ref 11.7–15.7)
IMM GRANULOCYTES # BLD: 0 10E9/L (ref 0–0.4)
IMM GRANULOCYTES NFR BLD: 0.2 %
LYMPHOCYTES # BLD AUTO: 1 10E9/L (ref 0.8–5.3)
LYMPHOCYTES NFR BLD AUTO: 16.1 %
MCH RBC QN AUTO: 30.8 PG (ref 26.5–33)
MCHC RBC AUTO-ENTMCNC: 32.5 G/DL (ref 31.5–36.5)
MCV RBC AUTO: 95 FL (ref 78–100)
MONOCYTES # BLD AUTO: 0.6 10E9/L (ref 0–1.3)
MONOCYTES NFR BLD AUTO: 9.9 %
NEUTROPHILS # BLD AUTO: 4.4 10E9/L (ref 1.6–8.3)
NEUTROPHILS NFR BLD AUTO: 70.1 %
NRBC # BLD AUTO: 0 10*3/UL
NRBC BLD AUTO-RTO: 0 /100
PLATELET # BLD AUTO: 223 10E9/L (ref 150–450)
POTASSIUM SERPL-SCNC: 4 MMOL/L (ref 3.4–5.3)
PROT SERPL-MCNC: 7.4 G/DL (ref 6.8–8.8)
RBC # BLD AUTO: 4.15 10E12/L (ref 3.8–5.2)
SODIUM SERPL-SCNC: 138 MMOL/L (ref 133–144)
WBC # BLD AUTO: 6.3 10E9/L (ref 4–11)

## 2017-12-02 PROCEDURE — 99284 EMERGENCY DEPT VISIT MOD MDM: CPT | Mod: 25

## 2017-12-02 PROCEDURE — 85025 COMPLETE CBC W/AUTO DIFF WBC: CPT | Performed by: EMERGENCY MEDICINE

## 2017-12-02 PROCEDURE — 99215 OFFICE O/P EST HI 40 MIN: CPT | Performed by: PHYSICIAN ASSISTANT

## 2017-12-02 PROCEDURE — 93970 EXTREMITY STUDY: CPT

## 2017-12-02 PROCEDURE — 25000132 ZZH RX MED GY IP 250 OP 250 PS 637: Mod: GY | Performed by: EMERGENCY MEDICINE

## 2017-12-02 PROCEDURE — 80053 COMPREHEN METABOLIC PANEL: CPT | Performed by: EMERGENCY MEDICINE

## 2017-12-02 PROCEDURE — A9270 NON-COVERED ITEM OR SERVICE: HCPCS | Mod: GY | Performed by: EMERGENCY MEDICINE

## 2017-12-02 RX ORDER — HYDROCODONE BITARTRATE AND ACETAMINOPHEN 5; 325 MG/1; MG/1
1 TABLET ORAL ONCE
Status: COMPLETED | OUTPATIENT
Start: 2017-12-02 | End: 2017-12-02

## 2017-12-02 RX ADMIN — HYDROCODONE BITARTRATE AND ACETAMINOPHEN 1 TABLET: 5; 325 TABLET ORAL at 13:40

## 2017-12-02 ASSESSMENT — ENCOUNTER SYMPTOMS
VOMITING: 0
DIARRHEA: 0
FOCAL WEAKNESS: 0
ABDOMINAL PAIN: 0
NAUSEA: 0
COLOR CHANGE: 1
FEVER: 0
PND: 0
FEVER: 0
HEADACHES: 0
SHORTNESS OF BREATH: 0
ORTHOPNEA: 0
MYALGIAS: 1
CHILLS: 0
SHORTNESS OF BREATH: 0

## 2017-12-02 NOTE — ED AVS SNAPSHOT
Emergency Department    [address] [place] [address]    Phone:  [phone]    Fax:  [phone]                                       Helene Gibbs   MRN: [id number]    Department:   Emergency Department   Date of Visit:  12/2/2017           After Visit Summary Signature Page     I have received my discharge instructions, and my questions have been answered. I have discussed any challenges I see with this plan with the nurse or doctor.    ..........................................................................................................................................  Patient/Patient Representative Signature      ..........................................................................................................................................  Patient Representative Print Name and Relationship to Patient    ..................................................               ................................................  Date                                            Time    ..........................................................................................................................................  Reviewed by Signature/Title    ...................................................              ..............................................  Date                                                            Time

## 2017-12-02 NOTE — MR AVS SNAPSHOT
After Visit Summary   12/2/2017    Helene Gibbs    MRN: [id number]           Patient Information     Date Of Birth          1935        Visit Information        Provider Department      12/2/2017 9:55 AM Keila Solares PA-C [facility]        Today's Diagnoses     Pain and swelling of lower leg, right    -  1    Type 2 diabetes mellitus with diabetic nephropathy, without long-term current use of insulin (H)        CKD (chronic kidney disease) stage 3, GFR 30-59 ml/min        Chronic diastolic congestive heart failure (H)        Hypertension goal BP (blood pressure) < 140/90        Venous stasis dermatitis, unspecified laterality          Care Instructions    Go to ER at [facility].          Follow-ups after your visit        Your next 10 appointments already scheduled     Dec 04, 2017 11:15 AM CST   Radiology Injections with Victor Hugo Bhardwaj MD   [facility] ([facility])    [address]  [address]  [facility] [address]   [phone]            Dec 04, 2017 11:15 AM CST   XR MEDIAL BRANCH BLOCK LUMBAR LEFT with [email or web address]   [facility] Xray ([facility])    [address]  [address]  [facility] [address]   [phone]           For nerve root injection, please send or bring copies of any MRIs or other scans you have had.  Bring a list of your current medicines to your exam. (Include vitamins, minerals and over-the-counter medicines.) Leave your valuables at home.  Plan to have someone drive you home afterward.  Stop taking the following medicines (but talk to your doctor first):   If you take blood thinners, you may need to stop taking them a few days before treatment. Talk to your doctor before stopping these medicines.Stop taking Coumadin (warfarin) 3 days before treatment. Restart the day after treatment.   If you take Plavix, Ticlid, Pletal or  Persantine, please ask your doctor if you should stop these medicines. You may need extra tests on the morning of your scan.   If you take Xarelto (Rivaroxaban), you may need to stop taking it 24 hours before treatment. Talk to your doctor before stopping this medicine. Restart the day after treatment.  You may take your other medicines as normal.  Stop all food and drink (including water) 3 hours before your test or treatment.  Please tell the doctor:   If you are allergic to X-ray dye (contrast fluid).   If you may be pregnant.  Injections take about 30 to 45 minutes. Most people spend up to 2 hours in the clinic or hospital.  Please call the Imaging Department at your exam site with any questions.            Dec 05, 2017  1:30 PM CST   Office Visit with Neisha Esparza MD   [facility]    [address] [facility] [id number] [phone]           Bring a current list of meds and any records pertaining to this visit. For Physicals, please bring immunization records and any forms needing to be filled out. Please arrive 10 minutes early to complete paperwork.            Jan 09, 2018  1:30 PM CST   Return Visit with  Oncology Nurse   [facility] ([facility])    [facility]  [address]  [facility] [address] [phone]            Jan 16, 2018  2:00 PM CST   Return Visit with Estiven Cali MD   [facility] ([facility])    [facility]  [address]  [facility] [address] [phone]            Jan 23, 2018  1:15 PM CST   Teletrace with STONE TECH1   [facility] ([facility])    [address]  [facility] [id number] [phone]              Who to contact     If you have questions or need follow up information about today's clinic visit or your schedule please contact [facility] directly at [phone].  Normal or non-critical lab and imaging results will be communicated to you by MyChart, letter or phone within 4 business days after the clinic has received the results. If you do not hear from us within 7 days, please contact the clinic through MyChart or phone. If you have a critical or abnormal lab result, we will notify you by phone as soon as possible.  Submit refill requests through [employer] or call your pharmacy and they will forward the refill request to us. Please allow 3 business days for your refill to be completed.          Additional Information About Your Visit        Care EveryWhere ID     This is your Care EveryWhere ID. This could be used by other organizations to access your [place] medical records  [id number]        Your Vitals Were     Pulse Temperature Last Period Pulse Oximetry          72 96.7  F (35.9  C) (Oral) (LMP Unknown) 96%         Blood Pressure from Last 3 Encounters:   12/02/17 138/74   11/10/17 171/83   11/01/17 118/54    Weight from Last 3 Encounters:   11/10/17 163 lb (73.9 kg)   11/01/17 163 lb (73.9 kg)   10/17/17 162 lb 9.6 oz (73.8 kg)              Today, you had the following     No orders found for display       Primary Care Provider Office Phone # Fax #    Neisha MINAYA MD Vilma [phone] [address]  [place]                [place] [address]        Goals        General    start date: 4/17/14 I will weigh myself daily and if any weight gain or shortness of breath I will call the clinic. (pt-stated)     Notes - Note created  4/17/2014  3:59 PM by Margareth Pichardo, RN    As of today's date 4/17/2014 goal is met at 0 - 25%.   Goal Status:  Active        Equal Access to Services     SAMRA TUCKER : Yariel Brady, washirleneda ludelvis, qaybta kaalpamela thompson. So [facility] [phone].    ATENCIÓN: Si habla español, tiene a gupta disposición servicios gratuitos  de asistencia lingüística. Maxim hunter [phone].    We comply with applicable federal civil rights laws and [place] laws. We do not discriminate on the basis of race, color, national origin, age, disability, sex, sexual orientation, or gender identity.            Thank you!     Thank you for choosing [facility]  for your care. Our goal is always to provide you with excellent care. Hearing back from our patients is one way we can continue to improve our services. Please take a few minutes to complete the written survey that you may receive in the mail after your visit with us. Thank you!             Your Updated Medication List - Protect others around you: Learn how to safely use, store and throw away your medicines at www.disposemymeds.org.          This list is accurate as of: 12/2/17 10:53 AM.  Always use your most recent med list.                   Brand Name Dispense Instructions for use Diagnosis    ACCU-CHEK SMARTVIEW test strip   Generic drug:  blood glucose monitoring     100 each    1 strip by In Vitro route 2 times daily Or as directed    Type 2 diabetes mellitus with diabetic nephropathy (H)       albuterol 108 (90 BASE) MCG/ACT Inhaler    PROAIR HFA/PROVENTIL HFA/VENTOLIN HFA    1 Inhaler    Inhale 2 puffs into the lungs every 4 hours as needed for shortness of breath / dyspnea or wheezing    COPD (chronic obstructive pulmonary disease) (H)       apixaban ANTICOAGULANT 2.5 MG tablet    ELIQUIS    60 tablet    Take 1 tablet (2.5 mg) by mouth 2 times daily    Atrial fibrillation, unspecified type (H)       betamethasone valerate 0.1 % cream    VALISONE    15 g    APPLY TOPICALLY 2 TIMES DAILY USE SPARINGLY    Rash       blood glucose calibration solution    no brand specified    1 each    Use to calibrate blood glucose monitor as directed. Please dispense brand of solution that works with patient's current meter    Type 2 diabetes mellitus with diabetic nephropathy, without  long-term current use of insulin (H)       blood glucose monitoring lancets     1 Box    USE TO TEST BLOOD SUGAR TWO TIMES A DAY OR AS DIRECTED    Type 2 diabetes mellitus with diabetic nephropathy (H)       COMPOUNDED NON-CONTROLLED SUBSTANCE - PHARMACY TO MIX COMPOUNDED MEDICATION    CMPD RX    42 suppository    Vaginal valium- 10 mg suppository    Vaginal pain, Pelvic floor tension, Urinary retention       fluticasone 50 MCG/ACT spray    FLONASE    16 mL    INHALE 1 TO 2 SPRAYS INTO BOTH NOSTRILS DAILY    Rhinitis, unspecified type       furosemide 20 MG tablet    LASIX    180 tablet    TAKE 1-2 TABS BY MOUTH ONCE DAILY. MAY REPEAT AFTER NOON IF NEEDED FOR WEIGHT GAIN/2+ EDEMA    Swelling       gabapentin 300 MG capsule    NEURONTIN    180 capsule    Take 2 capsules (600 mg) by mouth 3 times daily    Neuropathy       glipiZIDE 5 MG tablet    GLUCOTROL    135 tablet    TAKE 1 TABLET BY MOUTH EVERY MORNING AND 0.5 TABLET EVERY EVENING    Type 2 diabetes mellitus with diabetic nephropathy, without long-term current use of insulin (H)       HYDROcodone-acetaminophen 5-325 MG per tablet    NORCO    90 tablet    Take 1 tablet by mouth every 6 hours as needed for moderate to severe pain    Intractable back pain, Scoliosis of lumbar spine, unspecified scoliosis type, Chronic midline low back pain with sciatica, sciatica laterality unspecified       hydrocortisone 2.5 % cream    ANUSOL-HC    28 g    Place rectally 2 times daily as needed for hemorrhoids    External hemorrhoids       hydrOXYzine 25 MG capsule    VISTARIL    180 capsule    Take 1 capsule (25 mg) by mouth 2 times daily as needed for itching    Itching       * lidocaine-prilocaine cream    EMLA    30 g    Apply topically as needed for moderate pain    Post herpetic neuralgia, Myofascial pain       * lidocaine-prilocaine cream    EMLA    30 g    Apply topically as needed for moderate pain    Myofascial pain, Post herpetic neuralgia       lisinopril 10 MG  tablet    PRINIVIL/ZESTRIL    90 tablet    Take 1 tablet (10 mg) by mouth daily    Essential hypertension       magnesium hydroxide 400 MG/5ML suspension    MILK OF MAGNESIA     Take 30 mLs by mouth At Bedtime        nystatin 170591 UNIT/GM Powd    MYCOSTATIN    60 g    Apply topically 2 times daily as needed    Rash       ondansetron 4 MG ODT tab    ZOFRAN ODT    30 tablet    Take 1 tablet (4 mg) by mouth every 6 hours as needed for nausea    Nausea       order for DME     1 each    Equipment being ordered: AccuChek Smart View strips Patient tests twice daily.    Type II or unspecified type diabetes mellitus without mention of complication, not stated as uncontrolled       PRESERVISION/LUTEIN Caps      Take 1 capsule by mouth daily        ranitidine 150 MG tablet    ZANTAC    60 tablet    Take 1 tablet (150 mg) by mouth 2 times daily    Dyspepsia       simvastatin 10 MG tablet    ZOCOR    90 tablet    Take 1 tablet (10 mg) by mouth At Bedtime    Type 2 diabetes mellitus with diabetic nephropathy, without long-term current use of insulin (H)       tiotropium 18 MCG capsule    SPIRIVA HANDIHALER    30 capsule    Inhale 1 capsule (18 mcg) into the lungs daily    Chronic obstructive pulmonary disease, unspecified COPD type (H)       traMADol 50 MG tablet    ULTRAM    100 tablet    Take 1 tablet (50 mg) by mouth every 6 hours as needed for moderate pain    Intractable back pain       * Notice:  This list has 2 medication(s) that are the same as other medications prescribed for you. Read the directions carefully, and ask your doctor or other care provider to review them with you.

## 2017-12-02 NOTE — PROGRESS NOTES
HPI  December 2, 2017    HPI: Helene Gibbs is a 82 year old female with hx CHF, CKD, HTN, Afib (w/ pacemaker), COPD, and Type 2 DM who complains of moderate R leg swelling and pain onset last night. She states she normally has some swelling in BLE but RLE looks much worse than usual. She has been compliant with all of her meds. She reports chronic redness of the skin on both lower legs and no change in this. Symptoms are constant in duration. No treatments tried. Denies orthopnea, PND, myalgias, fever/chills, HA, CP, SOB, abd pain, N/V/D, rash, or any other symptoms. Pt smokes cigarettes.    Last A1c was 6.7% on 8/10/17.    Past Medical History:   Diagnosis Date     Anemia      Ankle arthritis      Arthritis      Back pain      Bell's palsy 9/08    left     Breast CA (H) 2004-    left lumpectomy, radiation, -recurrence     Colon polyps     colonoscopy-9/12-next due in 9/13     Congestive heart failure (H)      COPD (chronic obstructive pulmonary disease) (H)      Coronary artery disease      DM (diabetes mellitus) (H)      GERD (gastroesophageal reflux disease)      Hyperlipidemia      Hypertension      Lumbar spinal stenosis     use cane     Obesity      Osteoporosis      Other chronic pain      Pacemaker      Permanent atrial fibrillation (H) 8/2008    S/P AV node ablation and pacemaker 10-25-12       Pleural effusion      Pulmonary hypertension      Rectal stenosis      Tobacco abuse     current     Past Surgical History:   Procedure Laterality Date     ARTHROSCOPY SHOULDER RT/LT  1999, 2004    Bilateral, right then left     BONE MARROW BIOPSY, BONE SPECIMEN, NEEDLE/TROCAR N/A 8/29/2017    Procedure: BIOPSY BONE MARROW;  BONE MARROW BIOPSY;  Surgeon: Marino Cohen MD;  Location:  GI     C DEXA, BONE DENSITY, AXIAL SKEL       C MAMMOGRAM, SCREENING  1/2009     COLONOSCOPY N/A 10/7/2016    Procedure: COMBINED COLONOSCOPY, SINGLE OR MULTIPLE BIOPSY/POLYPECTOMY BY BIOPSY;  Surgeon: Cassandra Mccord  MD Alix;  Location:  GI     ESOPHAGOSCOPY, GASTROSCOPY, DUODENOSCOPY (EGD), COMBINED N/A 8/10/2017    Procedure: COMBINED ESOPHAGOSCOPY, GASTROSCOPY, DUODENOSCOPY (EGD), BIOPSY SINGLE OR MULTIPLE;  gastroscopy;  Surgeon: Helene Bustamante MD;  Location:  GI     H ABLATION AV NODE  10/2012     HC COLONOSCOPY THRU STOMA, DIAGNOSTIC  ? 2005     IMPLANT PACEMAKER  10/2012    [id number], [id number]     JOINT REPLACEMTN, KNEE RT/LT      Joint Replacement knee bilateral     LAPAROSCOPIC CHOLECYSTECTOMY WITH CHOLANGIOGRAMS N/A 12/14/2015    Procedure: LAPAROSCOPIC CHOLECYSTECTOMY WITH CHOLANGIOGRAMS;  Surgeon: Ervin Amos MD;  Location:  OR     LUMPECTOMY BREAST      Left-2004     PHACOEMULSIFICATION CLEAR CORNEA WITH STANDARD INTRAOCULAR LENS IMPLANT Left 7/16/2015    Procedure: PHACOEMULSIFICATION CLEAR CORNEA WITH STANDARD INTRAOCULAR LENS IMPLANT;  Surgeon: Sai Obregon MD;  Location: [facility]     PHACOEMULSIFICATION CLEAR CORNEA WITH TORIC INTRAOCULAR LENS IMPLANT Right 5/9/2017    Procedure: PHACOEMULSIFICATION CLEAR CORNEA WITH TORIC INTRAOCULAR LENS IMPLANT;  RIGHT EYE PHACOEMULSIFICATION CLEAR CORNEA WITH TORIC INTRAOCULAR LENS IMPLANT ;  Surgeon: Duc Richmond MD;  Location: [facility]     Social History   Substance Use Topics     Smoking status: Current Some Day Smoker     Packs/day: 0.25     Years: 45.00     Types: Cigarettes     Smokeless tobacco: Never Used      Comment: 01/25/15 6 or less cigarettes per day, intermittent     Alcohol use No     Patient Active Problem List   Diagnosis     Type 2 diabetes mellitus with diabetic nephropathy (H)     Anemia     CKD (chronic kidney disease) stage 3, GFR 30-59 ml/min     Diabetes with proteinuria     Chronic low back pain     Pleural effusion, left     Adjustment reaction with anxiety and depression     c diff colitis 11-12-12     Pulmonary hypertension     Melanosis of colon     Diplopia     COPD (chronic obstructive pulmonary disease) (H)      Elevated TSH     CHF (congestive heart failure) (H)     Cardiac pacemaker in situ     Neck pain     Intractable back pain     Health Care Home     Mild cognitive impairment SLUMS =   CPT = 5.0/ 5.5      Hypertension goal BP (blood pressure) < 140/90     Depression with anxiety     Tobacco abuse: [age]y/o on 16 @ 1ppd=50 pk yr hx      Stasis dermatitis     Thoracic disc herniation     Obesity     Hyperlipidemia     Nausea and vomiting     Cholelithiasis with acute on chronic cholecystitis     Slow transit constipation     Gastroesophageal reflux disease without esophagitis     Permanent atrial fibrillation (H)     Basal cell carcinoma of skin     Chronic pain syndrome     Tubular adenoma     ACP (advance care planning)     Gastrointestinal hemorrhage, unspecified gastrointestinal hemorrhage type     Iron deficiency     Adverse effect of iron     History of bone marrow biopsy     Herpes zoster without complication     Controlled substance agreement signed     Other chronic pain     Family History   Problem Relation Age of Onset     Hypertension Mother      DIABETES Mother       at 83 yrs     C.A.D. Father       age 70s     Breast Cancer No family hx of      Colon Cancer No family hx of         Problem list, Medication list, Allergies, and Medical/Social/Surgical histories reviewed in [facility] and updated as appropriate.    Review of Systems   Constitutional: Negative for chills and fever.   Respiratory: Negative for shortness of breath.    Cardiovascular: Positive for leg swelling. Negative for chest pain, orthopnea and PND.   Gastrointestinal: Negative for abdominal pain, diarrhea, nausea and vomiting.   Musculoskeletal: Positive for myalgias.   Skin: Negative for rash.        Chronic erythema, no change   Neurological: Negative for focal weakness and headaches.   All other systems reviewed and are negative.        Physical Exam   Constitutional: She is oriented to person, place, and time and  well-developed, well-nourished, and in no distress.   HENT:   Head: Normocephalic and atraumatic.   Cardiovascular: Normal rate, regular rhythm and normal heart sounds.    Pulmonary/Chest: Effort normal and breath sounds normal.   Musculoskeletal: Normal range of motion.        Right lower leg: She exhibits tenderness (calf) and edema (2+ pitting edema).        Left lower leg: She exhibits swelling (mild, nonpitting). She exhibits no tenderness.   R calf larger than L calf    Venous stasis skin changes to bilateral lower extremities; erythema equal on both side, no increased warmth or weeping   Neurological: She is alert and oriented to person, place, and time. Gait normal.   Skin: Skin is warm and dry. There is erythema.   Nursing note and vitals reviewed.    Vital Signs  /74  Pulse 72  Temp 96.7  F (35.9  C) (Oral)  LMP  (LMP Unknown)  SpO2 96%     Diagnostic Test Results:  none     ASSESSMENT/PLAN      ICD-10-CM    1. Pain and swelling of lower leg, right M79.661     M79.89    2. Type 2 diabetes mellitus with diabetic nephropathy, without long-term current use of insulin (H) E11.21    3. CKD (chronic kidney disease) stage 3, GFR 30-59 ml/min N18.3    4. Chronic diastolic congestive heart failure (H) I50.32    5. Hypertension goal BP (blood pressure) < 140/90 I10    6. Venous stasis dermatitis, unspecified laterality I87.2       Pt with multiple comorbidities and RLE pain/swelling with asymmetrical calf size. Differential diagnosis includes acute kidney injury, acute heart failure, venous stasis, DVT, cellulitis. Patient has been sent to ER at [facility] by private car (friend driving her) to rule these acute etiologies out.  Lungs CTAB, no resp distress, hemodynamically stable.      I have discussed any lab or imaging results, the patient's diagnosis, and my plan of treatment with the patient and/or family. Patient is aware to come back in if with worsening symptoms or if no relief despite  treatment plan.  Patient voiced understanding and had no further questions.       Follow Up: Data Unavailable    VIRGIL Hill, PA-C  [facility]

## 2017-12-02 NOTE — ED PROVIDER NOTES
History     Chief Complaint:  Leg Swelling       HPI   Helene Gibbs is a 82 year old female who presents with leg swelling.She was sent here from Urgent care for Ultrasound. At baseline has bilateral lower extremity edema but right leg increased in last few days. She notes that the redness hasn't changed and has been chronic for her for a long time. She denies fever, shortness of breath, chest pain. Denies trauma. Has had some weight gain over last several weeks. Is on lasix. She is on Eloquis though she states that this has been recently decreased.     Allergies:  Penicillins  Ambien  Sulfa drugs  Cymbalta  Fluconazole     Medications:    Lisinopril  Norco  Gabapentin  Eloquis  Zantac  Glipizide  Lasix  Spiriva  Tramadol  Zocor  Vistaril  Flonase  Albuterol  Milk of magnesia     Past Medical History:    Anemia  Ankle arthritis  Back pain  Bell's palsy  Breast cancer  Colon polyps  COPD  CHF  CAD  Diabetes mellitus  GERD  Hyperlipidemia  Hypertension  Lumbar spinal stenosis  Obesity  Osteoporosis  Other chronic pain  Pacemaker in place  Permament atrial fibrillation  Pleural effusion  Rectal stenosis  Tobacco abuse     Past Surgical History:    Arthroscopy, shoulder. Bilateral  Bone marrow biopsy  C Dexa, bone density, axial skeleton  Mammogram  Colonoscopy  Esophagoscopy, gastroscopy, duodenoscopy, combined  Ablation AV node  Colonoscopy through stoma  Joint replacement, knee, bilateral  Laparoscopic cholecystectomy with cholangiograms  Lumpectomy, breast, left  Phacoemulsification clear cornea with standard intraocular lens implant x2     Family History:    Hypertension  Diabetes  CAD     Social History:  Smoking Status: Yes  Smokeless Tobacco: No  Alcohol Use: NO  Marital Status:      Review of Systems   Constitutional: Negative for fever.   Respiratory: Negative for shortness of breath.    Cardiovascular: Positive for leg swelling.   Skin: Positive for color change.   All other systems reviewed and  "are negative.    Physical Exam   First Vitals:  BP: 136/70  Pulse: 70  Temp: 97.8  F (36.6  C)  Resp: 16  Height: 167.6 cm (5' 6\")  SpO2: 100 %      Physical Exam  General: Resting comfortably on the gurney  Eyes:  The pupils are equal and round    Conjunctivae and sclerae are normal  ENT:    Moist mucous membranes  Neck:  Normal range of motion  CV:  Regular rate and rhythm    Skin warm and well perfused     DP/PT pulses 2+ bilaterally  Resp:  Lungs are clear    Non-labored    No rales    No wheezing   GI:  Abdomen is soft, there is no rigidity    No distension    No rebound tenderness   MS:  Normal muscular tone    Bilateral lower extremity edema. Right greater than left. Erythema on bilateral lower extremities. No warmth   Skin:  No rash or acute skin lesions noted  Neuro:   Awake, alert.      Speech is normal and fluent.    Face is symmetric.     Moves all extremities equally    SILT on bilateral LE  Psych:  Normal affect.  Appropriate interactions.    Emergency Department Course   Imaging:  US Lower Extremity Venous Duplex Bilateral   Final Result   IMPRESSION: No evidence of deep venous thrombosis.      PAMELA GALICIA MD           Laboratory:  CBC: RDW 19.0 (H) otherwise within normal limits   CMP: GLUCOSE 113 (H) Creatinine 1.48 (H) GFR Estimate 34 (L) Calcium 10.2 (H)      Interventions:  1340: [place] PO    Emergency Department Course:  Past medical records, nursing notes, and vitals reviewed.  1106: I performed an exam of the patient and obtained history, as documented above.       Impression & Plan    Medical Decision Making:  Helene Gibbs is a 82 year old female who presents to the ED with leg swelling. Vital signs are normal. ultrasound was negative for DVT. She has chronic swelling at baseline with no worsening of the erythema that she has on both legs. I doubt infection given redness has not changed, nl WBC, no warmth, no fever. No evidence of compartment syndrome, necrotizing fasciitis, abscess. No " hx of trauma. She has no shortness of breath or chest pain to suggest CHF exacerbation that would require hospitalization. I recommended elevation, compression stockings and follow up with PCP. Should continue lasix as prescribed. Reasons for return to the ED were discussed with patient.     Diagnosis:    ICD-10-CM    1. Leg swelling M79.89        Disposition:  discharged to home    Discharge Medications:  New Prescriptions    No medications on file     Fouzia SCHULTZ, kandace serving as a scribe at 1:30 PM on 12/2/2017 to document services personally performed by Brenda Martins MD based on my observations and the provider's statements to me.      EMERGENCY DEPARTMENT       Brenda Martins MD  12/02/17 [id number]

## 2017-12-02 NOTE — ED AVS SNAPSHOT
Emergency Department    [address] [facility] [address]    Phone:  [phone]    Fax:  [phone]                                       Helene Gibbs   MRN: [id number]    Department:   Emergency Department   Date of Visit:  12/2/2017           Patient Information     Date Of Birth          1935        Your diagnoses for this visit were:     Leg swelling        You were seen by Brenda Martins MD.      Follow-up Information     Schedule an appointment as soon as possible for a visit with Neisha Esparza MD.    Specialty:  Internal Medicine    Contact information:    [address] [address] [phone]          Follow up with  Emergency Department.    Specialty:  EMERGENCY MEDICINE    Why:  If symptoms worsen    Contact information:    [address] [facility] [address] [phone]        Discharge Instructions       Follow up with primary care provider  Elevate your legs as much as possible  Can also try compression stockings  Follow up with your doctor on Tuesday  Watch for worsening redness, fever  Leg Swelling in a Single Leg  Swelling of the arms, feet, ankles, and legs is called edema. It is caused by extra fluid collecting in the tissues. Because of gravity, extra fluid in the body settles to the lowest part. That is why the legs and feet are most affected. You have swelling in a single leg.  Some of the causes for swelling in only a single leg include:    Infection in the foot or leg    Long-term problem with a vein not working well (venous insufficiency)    Swollen, twisted vein in the leg (varicose veins)    Insect bite or sting on the foot or leg    Injury or recent surgery on the foot or leg    Blood clot in a deep vein of the leg (deep vein thrombosis or DVT)    Inflammation of the joints of the lower leg  Medical treatment will depend on what is causing your swelling.  Home care  Follow these guidelines  when caring for yourself at home:    Don t wear tight clothing.    Keep your legs up while lying or sitting.    Take any medicines as directed.    If infection, injury, or recent surgery is the cause of your swelling, stay off your legs as much as possible until your symptoms get better.    If you have venous insufficiency or varicose veins, don t sit or  one place for long periods of time. Take breaks and walk around every few hours. Talk with your healthcare provider about wearing support stockings to help lessen swelling during the day.    Wear compression stockings with your doctor's approval  Follow-up care  Follow up with your healthcare provider as advised.  Call 911  Call 911 if any of these occur:    Shortness of breath or trouble breathing    Chest pain    Coughing up blood    Fainting or loss of consciousness   When to seek medical advice  Call your healthcare provider right away if any of these occur:    Increased pain, swelling, warmth, or redness of the leg, ankle, or foot    Fever of 100.4 F (38 C) or higher, or as directed by your healthcare provider    Weakness or dizziness    Shaking chills    Drenching sweats  Date Last Reviewed: 4/11/2016 2000-2017 [employer] [address] [place] [address]. All rights reserved. This information is not intended as a substitute for professional medical care. Always follow your healthcare professional's instructions.          Future Appointments        Provider Department Dept Phone Center    12/4/2017 11:15 AM Victor Hugo Bhardwaj MD [facility] [phone] [facility]    12/4/2017 11:15 AM [facility] X[facility] [phone] [facility]    12/5/2017 1:30 PM Neisha Esparza MD [facility] [phone]     1/9/2018 1:30 PM Oncology Nurse [facility] [phone] [facility]    1/16/2018 2:00 PM Estiven Cali MD [facility]  [phone] JAYSON PRUETT    1/23/2018 1:15 PM [facility] Heart Tech [facility]   [place] [phone] [facility]      24 Hour Appointment Hotline       To make an appointment at any [facility] call [phone] ([phone]). If you don't have a family doctor or clinic, we will help you find one. [facility] clinics are conveniently located to serve the needs of you and your family.             Review of your medicines      Our records show that you are taking the medicines listed below. If these are incorrect, please call your family doctor or clinic.        Dose / Directions Last dose taken    ACCU-CHEK SMARTVIEW test strip   Dose:  1 strip   Quantity:  100 each   Generic drug:  blood glucose monitoring        1 strip by In Vitro route 2 times daily Or as directed   Refills:  1        albuterol 108 (90 BASE) MCG/ACT Inhaler   Commonly known as:  PROAIR HFA/PROVENTIL HFA/VENTOLIN HFA   Dose:  2 puff   Quantity:  1 Inhaler        Inhale 2 puffs into the lungs every 4 hours as needed for shortness of breath / dyspnea or wheezing   Refills:  5        apixaban ANTICOAGULANT 2.5 MG tablet   Commonly known as:  ELIQUIS   Dose:  2.5 mg   Quantity:  60 tablet        Take 1 tablet (2.5 mg) by mouth 2 times daily   Refills:  3        betamethasone valerate 0.1 % cream   Commonly known as:  VALISONE   Quantity:  15 g        APPLY TOPICALLY 2 TIMES DAILY USE SPARINGLY   Refills:  1        blood glucose calibration solution   Commonly known as:  no brand specified   Quantity:  1 each        Use to calibrate blood glucose monitor as directed. Please dispense brand of solution that works with patient's current meter   Refills:  11        blood glucose monitoring lancets   Quantity:  1 Box        USE TO TEST BLOOD SUGAR TWO TIMES A DAY OR AS DIRECTED   Refills:  11        COMPOUNDED NON-CONTROLLED SUBSTANCE - PHARMACY TO MIX COMPOUNDED MEDICATION   Commonly known as:  CMPD RX   Quantity:  42  suppository        Vaginal valium- 10 mg suppository   Refills:  3        fluticasone 50 MCG/ACT spray   Commonly known as:  FLONASE   Quantity:  16 mL        INHALE 1 TO 2 SPRAYS INTO BOTH NOSTRILS DAILY   Refills:  3        furosemide 20 MG tablet   Commonly known as:  LASIX   Quantity:  180 tablet        TAKE 1-2 TABS BY MOUTH ONCE DAILY. MAY REPEAT AFTER NOON IF NEEDED FOR WEIGHT GAIN/2+ EDEMA   Refills:  1        gabapentin 300 MG capsule   Commonly known as:  NEURONTIN   Dose:  600 mg   Quantity:  180 capsule        Take 2 capsules (600 mg) by mouth 3 times daily   Refills:  3        glipiZIDE 5 MG tablet   Commonly known as:  GLUCOTROL   Quantity:  135 tablet        TAKE 1 TABLET BY MOUTH EVERY MORNING AND 0.5 TABLET EVERY EVENING   Refills:  1        HYDROcodone-acetaminophen 5-325 MG per tablet   Commonly known as:  NORCO   Dose:  1 tablet   Quantity:  90 tablet        Take 1 tablet by mouth every 6 hours as needed for moderate to severe pain   Refills:  0        hydrocortisone 2.5 % cream   Commonly known as:  ANUSOL-HC   Quantity:  28 g        Place rectally 2 times daily as needed for hemorrhoids   Refills:  1        hydrOXYzine 25 MG capsule   Commonly known as:  VISTARIL   Dose:  25 mg   Quantity:  180 capsule        Take 1 capsule (25 mg) by mouth 2 times daily as needed for itching   Refills:  1        * lidocaine-prilocaine cream   Commonly known as:  EMLA   Quantity:  30 g        Apply topically as needed for moderate pain   Refills:  1        * lidocaine-prilocaine cream   Commonly known as:  EMLA   Quantity:  30 g        Apply topically as needed for moderate pain   Refills:  1        lisinopril 10 MG tablet   Commonly known as:  PRINIVIL/ZESTRIL   Dose:  10 mg   Quantity:  90 tablet        Take 1 tablet (10 mg) by mouth daily   Refills:  1        magnesium hydroxide 400 MG/5ML suspension   Commonly known as:  MILK OF MAGNESIA   Dose:  30 mL        Take 30 mLs by mouth At Bedtime   Refills:  0         nystatin [id number] UNIT/GM Powd   Commonly known as:  MYCOSTATIN   Quantity:  60 g        Apply topically 2 times daily as needed   Refills:  1        ondansetron 4 MG ODT tab   Commonly known as:  ZOFRAN ODT   Dose:  4 mg   Quantity:  30 tablet        Take 1 tablet (4 mg) by mouth every 6 hours as needed for nausea   Refills:  1        order for DME   Quantity:  1 each        Equipment being ordered: AccuChek Smart View strips Patient tests twice daily.   Refills:  11        PRESERVISION/LUTEIN Caps   Dose:  1 capsule        Take 1 capsule by mouth daily   Refills:  0        ranitidine 150 MG tablet   Commonly known as:  ZANTAC   Dose:  150 mg   Quantity:  60 tablet        Take 1 tablet (150 mg) by mouth 2 times daily   Refills:  3        simvastatin 10 MG tablet   Commonly known as:  ZOCOR   Dose:  10 mg   Quantity:  90 tablet        Take 1 tablet (10 mg) by mouth At Bedtime   Refills:  1        tiotropium 18 MCG capsule   Commonly known as:  SPIRIVA HANDIHALER   Dose:  18 mcg   Quantity:  30 capsule        Inhale 1 capsule (18 mcg) into the lungs daily   Refills:  3        traMADol 50 MG tablet   Commonly known as:  ULTRAM   Dose:  50 mg   Quantity:  100 tablet        Take 1 tablet (50 mg) by mouth every 6 hours as needed for moderate pain   Refills:  1        * Notice:  This list has 2 medication(s) that are the same as other medications prescribed for you. Read the directions carefully, and ask your doctor or other care provider to review them with you.            Procedures and tests performed during your visit     CBC with platelets + differential    Comprehensive metabolic panel    US Lower Extremity Venous Duplex Bilateral      Orders Needing Specimen Collection     None      Pending Results     No orders found from 11/30/2017 to 12/3/2017.            Pending Culture Results     No orders found from 11/30/2017 to 12/3/2017.            Pending Results Instructions     If you had any lab results that were  not finalized at the time of your Discharge, you can call the ED Lab Result RN at [phone]. You will be contacted by this team for any positive Lab results or changes in treatment. The nurses are available 7 days a week from 10A to 6:30P.  You can leave a message 24 hours per day and they will return your call.        Test Results From Your Hospital Stay        12/2/2017 12:02 PM      Component Results     Component Value Ref Range & Units Status    WBC 6.3 4.0 - 11.0 10e9/L Final    RBC Count 4.15 3.8 - 5.2 10e12/L Final    Hemoglobin 12.8 11.7 - 15.7 g/dL Final    Hematocrit 39.4 35.0 - 47.0 % Final    MCV 95 78 - 100 fl Final    MCH 30.8 26.5 - 33.0 pg Final    MCHC 32.5 31.5 - 36.5 g/dL Final    RDW 19.0 (H) 10.0 - 15.0 % Final    Platelet Count 223 150 - 450 10e9/L Final    Diff Method Automated Method  Final    % Neutrophils 70.1 % Final    % Lymphocytes 16.1 % Final    % Monocytes 9.9 % Final    % Eosinophils 3.2 % Final    % Basophils 0.5 % Final    % Immature Granulocytes 0.2 % Final    Nucleated RBCs 0 0 /100 Final    Absolute Neutrophil 4.4 1.6 - 8.3 10e9/L Final    Absolute Lymphocytes 1.0 0.8 - 5.3 10e9/L Final    Absolute Monocytes 0.6 0.0 - 1.3 10e9/L Final    Absolute Eosinophils 0.2 0.0 - 0.7 10e9/L Final    Absolute Basophils 0.0 0.0 - 0.2 10e9/L Final    Abs Immature Granulocytes 0.0 0 - 0.4 10e9/L Final    Absolute Nucleated RBC 0.0  Final         12/2/2017 12:24 PM      Component Results     Component Value Ref Range & Units Status    Sodium 138 133 - 144 mmol/L Final    Potassium 4.0 3.4 - 5.3 mmol/L Final    Chloride 100 94 - 109 mmol/L Final    Carbon Dioxide 35 (H) 20 - 32 mmol/L Final    Anion Gap 3 3 - 14 mmol/L Final    Glucose 113 (H) 70 - 99 mg/dL Final    Urea Nitrogen 27 7 - 30 mg/dL Final    Creatinine 1.48 (H) 0.52 - 1.04 mg/dL Final    GFR Estimate 34 (L) >60 mL/min/1.7m2 Final    Non  GFR Calc    GFR Estimate If Black 41 (L) >60 mL/min/1.7m2 Final      American GFR Calc    Calcium 10.2 (H) 8.5 - 10.1 mg/dL Final    Bilirubin Total 0.3 0.2 - 1.3 mg/dL Final    Albumin 3.4 3.4 - 5.0 g/dL Final    Protein Total 7.4 6.8 - 8.8 g/dL Final    Alkaline Phosphatase 141 40 - 150 U/L Final    ALT 17 0 - 50 U/L Final    AST 12 0 - 45 U/L Final         12/2/2017  1:27 PM      Narrative     VENOUS ULTRASOUND BILATERAL LOWER EXTREMITY  12/2/2017 1:21 PM     HISTORY: Bilateral leg swelling, right greater than left, rule out  deep vein thrombosis.     COMPARISON: 2/22/2011    FINDINGS:  Examination of the deep veins with graded compression and  color flow Doppler with spectral wave form analysis shows no evidence  of thrombus in the common femoral vein, femoral vein, popliteal vein  or calf veins.  There is no venous insufficiency. Calf veins were  somewhat difficult to visualize due to edema.        Impression     IMPRESSION: No evidence of deep venous thrombosis.    PAMELA GALICIA MD                Clinical Quality Measure: Blood Pressure Screening     Your blood pressure was checked while you were in the emergency department today. The last reading we obtained was  BP: (!) 161/93 . Please read the guidelines below about what these numbers mean and what you should do about them.  If your systolic blood pressure (the top number) is less than 120 and your diastolic blood pressure (the bottom number) is less than 80, then your blood pressure is normal. There is nothing more that you need to do about it.  If your systolic blood pressure (the top number) is 120-139 or your diastolic blood pressure (the bottom number) is 80-89, your blood pressure may be higher than it should be. You should have your blood pressure rechecked within a year by a primary care provider.  If your systolic blood pressure (the top number) is 140 or greater or your diastolic blood pressure (the bottom number) is 90 or greater, you may have high blood pressure. High blood pressure is treatable, but if left  untreated over time it can put you at risk for heart attack, stroke, or kidney failure. You should have your blood pressure rechecked by a primary care provider within the next 4 weeks.  If your provider in the emergency department today gave you specific instructions to follow-up with your doctor or provider even sooner than that, you should follow that instruction and not wait for up to 4 weeks for your follow-up visit.        Thank you for choosing [facility]       Thank you for choosing [facility] for your care. Our goal is always to provide you with excellent care. Hearing back from our patients is one way we can continue to improve our services. Please take a few minutes to complete the written survey that you may receive in the mail after you visit with us. Thank you!        Care EveryWhere ID     This is your Care EveryWhere ID. This could be used by other organizations to access your [facility] medical records  [id number]        Equal Access to Services     TONG TUCKER : Yariel Brady, jefe valentino, pamela craft. So [facility] [phone].    ATENCIÓN: Si habla español, tiene a gupta disposición servicios gratuitos de asistencia lingüística. Maxim al [phone].    We comply with applicable federal civil rights laws and [place] laws. We do not discriminate on the basis of race, color, national origin, age, disability, sex, sexual orientation, or gender identity.            After Visit Summary       This is your record. Keep this with you and show to your community pharmacist(s) and doctor(s) at your next visit.

## 2017-12-02 NOTE — DISCHARGE INSTRUCTIONS
Follow up with primary care provider  Elevate your legs as much as possible  Can also try compression stockings  Follow up with your doctor on Tuesday  Watch for worsening redness, fever  Leg Swelling in a Single Leg  Swelling of the arms, feet, ankles, and legs is called edema. It is caused by extra fluid collecting in the tissues. Because of gravity, extra fluid in the body settles to the lowest part. That is why the legs and feet are most affected. You have swelling in a single leg.  Some of the causes for swelling in only a single leg include:    Infection in the foot or leg    Long-term problem with a vein not working well (venous insufficiency)    Swollen, twisted vein in the leg (varicose veins)    Insect bite or sting on the foot or leg    Injury or recent surgery on the foot or leg    Blood clot in a deep vein of the leg (deep vein thrombosis or DVT)    Inflammation of the joints of the lower leg  Medical treatment will depend on what is causing your swelling.  Home care  Follow these guidelines when caring for yourself at home:    Don t wear tight clothing.    Keep your legs up while lying or sitting.    Take any medicines as directed.    If infection, injury, or recent surgery is the cause of your swelling, stay off your legs as much as possible until your symptoms get better.    If you have venous insufficiency or varicose veins, don t sit or  one place for long periods of time. Take breaks and walk around every few hours. Talk with your healthcare provider about wearing support stockings to help lessen swelling during the day.    Wear compression stockings with your doctor's approval  Follow-up care  Follow up with your healthcare provider as advised.  Call 911  Call 911 if any of these occur:    Shortness of breath or trouble breathing    Chest pain    Coughing up blood    Fainting or loss of consciousness   When to seek medical advice  Call your healthcare provider right away if any of these  occur:    Increased pain, swelling, warmth, or redness of the leg, ankle, or foot    Fever of 100.4 F (38 C) or higher, or as directed by your healthcare provider    Weakness or dizziness    Shaking chills    Drenching sweats  Date Last Reviewed: 4/11/2016 2000-2017 [employer]. [address] [place] [id number]. All rights reserved. This information is not intended as a substitute for professional medical care. Always follow your healthcare professional's instructions.

## 2017-12-04 ENCOUNTER — RADIOLOGY INJECTION OFFICE VISIT (OUTPATIENT)
Dept: PALLIATIVE MEDICINE | Facility: CLINIC | Age: 82
End: 2017-12-04

## 2017-12-04 VITALS — SYSTOLIC BLOOD PRESSURE: 147 MMHG | DIASTOLIC BLOOD PRESSURE: 72 MMHG | HEART RATE: 70 BPM | OXYGEN SATURATION: 97 %

## 2017-12-04 DIAGNOSIS — M47.812 CERVICAL SPONDYLOSIS WITHOUT MYELOPATHY: Primary | ICD-10-CM

## 2017-12-04 NOTE — NURSING NOTE
Pre-procedure Intake    Have you been fasting? NA    If yes, for how long?     Are you taking a prescribed blood thinner such as coumadin, Plavix, Xarelto?    Yes -   Other blood thinners Eliquis    If yes, when did you take your last dose? Currently taking    Do you take aspirin?  No    If cervical procedure, have you held aspirin for 6 days?   NA    Do you have any allergies to contrast dye, iodine, steroid and/or numbing medications?  NO    Are you currently taking antibiotics or have an active infection?  NO    Have you had a fever/elevated temperature within the past week? NO    Are you currently taking oral steroids? NO    Do you have a ? Yes       Are you pregnant or breastfeeding?  Not Applicable    Are the vital signs normal?  Yes

## 2017-12-05 ENCOUNTER — OFFICE VISIT (OUTPATIENT)
Dept: FAMILY MEDICINE | Facility: CLINIC | Age: 82
End: 2017-12-05
Payer: MEDICARE

## 2017-12-05 VITALS
BODY MASS INDEX: 26.68 KG/M2 | OXYGEN SATURATION: 98 % | SYSTOLIC BLOOD PRESSURE: 131 MMHG | DIASTOLIC BLOOD PRESSURE: 73 MMHG | HEIGHT: 66 IN | HEART RATE: 73 BPM | TEMPERATURE: 97.6 F | WEIGHT: 166 LBS

## 2017-12-05 DIAGNOSIS — R60.0 EDEMA OF LOWER EXTREMITY: Primary | ICD-10-CM

## 2017-12-05 DIAGNOSIS — B02.29 POSTHERPETIC NEURALGIA: ICD-10-CM

## 2017-12-05 DIAGNOSIS — N18.30 CKD (CHRONIC KIDNEY DISEASE) STAGE 3, GFR 30-59 ML/MIN (H): ICD-10-CM

## 2017-12-05 DIAGNOSIS — M41.9 SCOLIOSIS OF LUMBAR SPINE, UNSPECIFIED SCOLIOSIS TYPE: ICD-10-CM

## 2017-12-05 DIAGNOSIS — M54.40 CHRONIC MIDLINE LOW BACK PAIN WITH SCIATICA, SCIATICA LATERALITY UNSPECIFIED: ICD-10-CM

## 2017-12-05 DIAGNOSIS — B37.2 CANDIDAL INTERTRIGO: ICD-10-CM

## 2017-12-05 DIAGNOSIS — M54.9 INTRACTABLE BACK PAIN: ICD-10-CM

## 2017-12-05 DIAGNOSIS — G89.29 CHRONIC MIDLINE LOW BACK PAIN WITH SCIATICA, SCIATICA LATERALITY UNSPECIFIED: ICD-10-CM

## 2017-12-05 PROCEDURE — 99214 OFFICE O/P EST MOD 30 MIN: CPT | Performed by: INTERNAL MEDICINE

## 2017-12-05 RX ORDER — HYDROCODONE BITARTRATE AND ACETAMINOPHEN 5; 325 MG/1; MG/1
1 TABLET ORAL EVERY 6 HOURS PRN
Qty: 90 TABLET | Refills: 0 | Status: SHIPPED | OUTPATIENT
Start: 2017-12-20 | End: 2018-01-25

## 2017-12-05 RX ORDER — NYSTATIN 100000 [USP'U]/G
POWDER TOPICAL
Qty: 60 G | Refills: 1 | Status: ON HOLD | OUTPATIENT
Start: 2017-12-05 | End: 2018-01-18

## 2017-12-05 NOTE — PATIENT INSTRUCTIONS
I refilled your prescriptions  Dry the area of fungal infection after showering  Put nystatin powder twice a day  Change Gabapentin to 300 mg twice a day  If tolerated, try 300 mg at bedtime  If not, then stay with 300 mg 3 times a day  Limit Furosemide to 20 mg once a day  Check with your insurance company about coverage for new Shingles vaccine, which is going to be available at the beginning of next year  Norco an be habit-forming. It should be taken as prescribed. Do not mix it  with alcohol. Be careful with driving.Do not loose the  Prescription.  Do not overuse this medication. It is a controlled substance.  Follow up in 6 weeks  Seek sooner medical attention if there is any worsening of symptoms or problems.

## 2017-12-05 NOTE — PROGRESS NOTES
SUBJECTIVE:   Helene Gibbs is a 82 year old female who presents to clinic today for the following health issues:      ED/UC Followup:    Facility:   ED  Date of visit: 12/02/17  Reason for visit: Leg swelling  Current Status: still having some swelling     Reported leg swelling on 12/02/17, and patient was concerned about blood clot  She went to ED, ran many tests, did U/S, and per patient, nothing came up  She wants to get rid of her excess fluid  Explained that one of side effects for gabapentin is LL edema    Reports that her postherpetic neuralgia is much better    Problem list and histories reviewed & adjusted, as indicated.  Additional history: as documented    Patient Active Problem List   Diagnosis     Type 2 diabetes mellitus with diabetic nephropathy (H)     Anemia     CKD (chronic kidney disease) stage 3, GFR 30-59 ml/min     Diabetes with proteinuria     Chronic low back pain     Pleural effusion, left     Adjustment reaction with anxiety and depression     c diff colitis 11-12-12     Pulmonary hypertension     Melanosis of colon     Diplopia     COPD (chronic obstructive pulmonary disease) (H)     Elevated TSH     CHF (congestive heart failure) (H)     Cardiac pacemaker in situ     Neck pain     Intractable back pain     Health Care Home     Mild cognitive impairment SLUMS = 22/ 30  CPT = 5.0/ 5.5 7-2014     Hypertension goal BP (blood pressure) < 140/90     Depression with anxiety     Tobacco abuse: [age]y/o on 8-12-14 @ 1ppd=50 pk yr hx      Stasis dermatitis     Thoracic disc herniation     Obesity     Hyperlipidemia     Nausea and vomiting     Cholelithiasis with acute on chronic cholecystitis     Slow transit constipation     Gastroesophageal reflux disease without esophagitis     Permanent atrial fibrillation (H)     Basal cell carcinoma of skin     Chronic pain syndrome     Tubular adenoma     ACP (advance care planning)     Gastrointestinal hemorrhage, unspecified gastrointestinal  hemorrhage type     Iron deficiency     Adverse effect of iron     History of bone marrow biopsy     Herpes zoster without complication     Controlled substance agreement signed     Other chronic pain     Past Surgical History:   Procedure Laterality Date     ARTHROSCOPY SHOULDER RT/LT  1999, 2004    Bilateral, right then left     BONE MARROW BIOPSY, BONE SPECIMEN, NEEDLE/TROCAR N/A 8/29/2017    Procedure: BIOPSY BONE MARROW;  BONE MARROW BIOPSY;  Surgeon: Marino Cohen MD;  Location:  GI     C DEXA, BONE DENSITY, AXIAL SKEL       C MAMMOGRAM, SCREENING  1/2009     COLONOSCOPY N/A 10/7/2016    Procedure: COMBINED COLONOSCOPY, SINGLE OR MULTIPLE BIOPSY/POLYPECTOMY BY BIOPSY;  Surgeon: Cassandra Mccord MD;  Location:  GI     ESOPHAGOSCOPY, GASTROSCOPY, DUODENOSCOPY (EGD), COMBINED N/A 8/10/2017    Procedure: COMBINED ESOPHAGOSCOPY, GASTROSCOPY, DUODENOSCOPY (EGD), BIOPSY SINGLE OR MULTIPLE;  gastroscopy;  Surgeon: Helene Bustamante MD;  Location:  GI     H ABLATION AV NODE  10/2012     HC COLONOSCOPY THRU STOMA, DIAGNOSTIC  ? 2005     IMPLANT PACEMAKER  10/2012    [place] [id number], [id number]     JOINT REPLACEMTN, KNEE RT/LT      Joint Replacement knee bilateral     LAPAROSCOPIC CHOLECYSTECTOMY WITH CHOLANGIOGRAMS N/A 12/14/2015    Procedure: LAPAROSCOPIC CHOLECYSTECTOMY WITH CHOLANGIOGRAMS;  Surgeon: Ervin Amos MD;  Location:  OR     LUMPECTOMY BREAST      Left-2004     PHACOEMULSIFICATION CLEAR CORNEA WITH STANDARD INTRAOCULAR LENS IMPLANT Left 7/16/2015    Procedure: PHACOEMULSIFICATION CLEAR CORNEA WITH STANDARD INTRAOCULAR LENS IMPLANT;  Surgeon: Sai Obregon MD;  Location:  EC     PHACOEMULSIFICATION CLEAR CORNEA WITH TORIC INTRAOCULAR LENS IMPLANT Right 5/9/2017    Procedure: PHACOEMULSIFICATION CLEAR CORNEA WITH TORIC INTRAOCULAR LENS IMPLANT;  RIGHT EYE PHACOEMULSIFICATION CLEAR CORNEA WITH TORIC INTRAOCULAR LENS IMPLANT ;  Surgeon: Duc Richmond MD;  Location:   EC       Social History   Substance Use Topics     Smoking status: Current Some Day Smoker     Packs/day: 0.25     Years: 45.00     Types: Cigarettes     Smokeless tobacco: Never Used      Comment: 01/25/15 6 or less cigarettes per day, intermittent     Alcohol use No     Family History   Problem Relation Age of Onset     Hypertension Mother      DIABETES Mother       at 83 yrs     C.A.D. Father       age 70s     Breast Cancer No family hx of      Colon Cancer No family hx of          Current Outpatient Prescriptions   Medication Sig Dispense Refill     nystatin (MYCOSTATIN) 320100 UNIT/GM POWD Apply topically 2 times daily 60 g 1     [START ON 2017] HYDROcodone-acetaminophen (NORCO) 5-325 MG per tablet Take 1 tablet by mouth every 6 hours as needed for moderate to severe pain 90 tablet 0     gabapentin (NEURONTIN) 300 MG capsule Take 2 capsules (600 mg) by mouth 3 times daily 180 capsule 3     lidocaine-prilocaine (EMLA) cream Apply topically as needed for moderate pain 30 g 1     lidocaine-prilocaine (EMLA) cream Apply topically as needed for moderate pain 30 g 1     ranitidine (ZANTAC) 150 MG tablet Take 1 tablet (150 mg) by mouth 2 times daily 60 tablet 3     blood glucose calibration (NO BRAND SPECIFIED) solution Use to calibrate blood glucose monitor as directed. Please dispense brand of solution that works with patient's current meter 1 each 11     ondansetron (ZOFRAN ODT) 4 MG ODT tab Take 1 tablet (4 mg) by mouth every 6 hours as needed for nausea 30 tablet 1     apixaban ANTICOAGULANT (ELIQUIS) 2.5 MG tablet Take 1 tablet (2.5 mg) by mouth 2 times daily 60 tablet 3     betamethasone valerate (VALISONE) 0.1 % cream APPLY TOPICALLY 2 TIMES DAILY USE SPARINGLY 15 g 1     glipiZIDE (GLUCOTROL) 5 MG tablet TAKE 1 TABLET BY MOUTH EVERY MORNING AND 0.5 TABLET EVERY EVENING 135 tablet 1     lisinopril (PRINIVIL/ZESTRIL) 10 MG tablet Take 1 tablet (10 mg) by mouth daily 90 tablet 1     furosemide  (LASIX) 20 MG tablet TAKE 1-2 TABS BY MOUTH ONCE DAILY. MAY REPEAT AFTER NOON IF NEEDED FOR WEIGHT GAIN/2+ EDEMA 180 tablet 1     tiotropium (SPIRIVA HANDIHALER) 18 MCG capsule Inhale 1 capsule (18 mcg) into the lungs daily 30 capsule 3     traMADol (ULTRAM) 50 MG tablet Take 1 tablet (50 mg) by mouth every 6 hours as needed for moderate pain 100 tablet 1     simvastatin (ZOCOR) 10 MG tablet Take 1 tablet (10 mg) by mouth At Bedtime 90 tablet 1     hydrOXYzine (VISTARIL) 25 MG capsule Take 1 capsule (25 mg) by mouth 2 times daily as needed for itching 180 capsule 1     fluticasone (FLONASE) 50 MCG/ACT spray INHALE 1 TO 2 SPRAYS INTO BOTH NOSTRILS DAILY 16 mL 3     COMPOUND (CMPD RX) - PHARMACY TO MIX COMPOUNDED MEDICATION Vaginal valium- 10 mg suppository 42 suppository 3     hydrocortisone (ANUSOL-HC) 2.5 % rectal cream Place rectally 2 times daily as needed for hemorrhoids 28 g 1     ACCU-CHEK SMARTVIEW test strip 1 strip by In Vitro route 2 times daily Or as directed 100 each 1     blood glucose monitoring (ACCU-CHEK FASTCLIX) lancets USE TO TEST BLOOD SUGAR TWO TIMES A DAY OR AS DIRECTED 1 Box 11     Multiple Vitamins-Minerals (PRESERVISION/LUTEIN) CAPS Take 1 capsule by mouth daily        nystatin (MYCOSTATIN) 119238 UNIT/GM POWD Apply topically 2 times daily as needed 60 g 1     ORDER FOR DME, SET TO LOCAL PRINT, Equipment being ordered: AccuChek Smart View strips  Patient tests twice daily. 1 each 11     albuterol (PROAIR HFA, PROVENTIL HFA, VENTOLIN HFA) 108 (90 BASE) MCG/ACT inhaler Inhale 2 puffs into the lungs every 4 hours as needed for shortness of breath / dyspnea or wheezing 1 Inhaler 5     magnesium hydroxide (MILK OF MAGNESIA) 400 MG/5ML suspension Take 30 mLs by mouth At Bedtime        Allergies   Allergen Reactions     Penicillins Hives     Ambien [Zolpidem Tartrate]      Hallucinations and fell out of bed at night.     Definity      Caused a syncopal episode.     Sulfa Drugs Itching     Cymbalta  "Rash     Fluconazole Rash     Labs reviewed in EPIC      Reviewed and updated as needed this visit by clinical staff  Tobacco  Allergies  Meds  Problems  Med Hx  Surg Hx  Fam Hx  Soc Hx        Reviewed and updated as needed this visit by Provider       ROS:  Constitutional, HEENT, cardiovascular, pulmonary, GI, , musculoskeletal, neuro, skin, endocrine and psych systems are negative, except as otherwise noted.    POSITIVE for bilateral leg swelling    This document serves as a record of the services and decisions personally performed and made by Neisha Esparza MD. It was created on her behalf by Cassandra Holcomb, a trained medical scribe. The creation of this document is based on the provider's statements to the medical scribe.  Cassandra Holcomb 1:46 PM December 5, 2017    OBJECTIVE:   /73 (BP Location: Left arm, Cuff Size: Adult Large)  Pulse 73  Temp 97.6  F (36.4  C) (Oral)  Ht 1.676 m (5' 6\")  Wt 75.3 kg (166 lb)  LMP  (LMP Unknown)  SpO2 98%  BMI 26.79 kg/m2  Body mass index is 26.79 kg/(m^2).    GENERAL: overweight, alert and no distress  MS: no gross musculoskeletal defects noted, bilateral LL edema  SKIN: fungal infection below left breast  PSYCH: mentation appears normal, affect normal/bright    Diagnostic Test Results: none    Reviewed abdominal/pelvis CT on 09/29/17, 2 cm cyst in lower part of left kidney  ASSESSMENT/PLAN:     Helene was seen today for hospital f/u.    Diagnoses and all orders for this visit:    Edema of lower extremity:  multifactorial due to venous insufficiency, lymphedema and  Gabapentin also makes edema versus  Patient reported leg swelling on 12/02/17 and was concerned about blood clot  She went to ED, ran many tests, did U/S, and per patient, nothing came up  She wants to get rid of her excess fluid  Explained that one of side effects for gabapentin is LL edema  Patient had many questions about gabapentin, all of which were answered  I advised her to try to " cut down on her dose of gabapentin and see if it's tolerated as that will decrease the swelling in her legs  I explained to her that continued use of compression socks would also help with the fluid rentention    Postherpetic neuralgia  Patient was diagnosed with Shingles in 11/01/17  Reports that her postherpetic neuralgia is much better now  She is still using gabapentin and hydrocodone to control the pain  Spoke with her about the new Shingles vaccine and that it's 90% effective  She's interested in taking it at her next visit    CKD (chronic kidney disease) stage 3, GFR 30-59 ml/min  Reports that she's drinking adequately  Reports taking 40 mg of furosemide daily  Advised her to cut down too 20 mg daily instead so she doesn't get dehydrated   Dehydration and can affect her chronic kidney disease  Advised to continue to wear compression socks for edema    Chronic midline low back pain with sciatica, sciatica laterality unspecified  -     HYDROcodone-acetaminophen (NORCO) 5-325 MG per tablet; Take 1 tablet by mouth every 6 hours as needed for moderate to severe pain  Patient has been dealing with chronic back pain with sciatica for a long time  She also suffers from scoliosis of her lumbar spine due to osteoporosis  She follows up with Dr. Bhardwaj, an anesthesiologist and pain specialist  Follows up with Dr. De La O, an endocrinologist for her osteoporosis  She reports taking hydrocodone 3 times daily to control her pain  I explained to her that hydrocodone is a control substance and we discussed the risks of taking it  patient understands the side effects  She is able to function with pain medication  Due to blood thinner she cannot take nonsteroidal anti-inflammatory pain medication    Intractable back pain  -     HYDROcodone-acetaminophen (NORCO) 5-325 MG per tablet; Take 1 tablet by mouth every 6 hours as needed for moderate to severe pain  See above    Scoliosis of lumbar spine, unspecified scoliosis type  -      HYDROcodone-acetaminophen (NORCO) 5-325 MG per tablet; Take 1 tablet by mouth every 6 hours as needed for moderate to severe pain  See above    Candidal intertrigo  Comments:  under left breast  Orders:  -     nystatin (MYCOSTATIN) 224938 UNIT/GM POWD; Apply topically 2 times daily  Patient was complaining of itching below her left breast  Upon examination, I found a fungal infection  I advised her to apply nystatin twice daily and to keep the area dry  She will report to me on how she's doing    Patient Instructions   I refilled your prescriptions  Dry the area of fungal infection after showering  Put nystatin powder twice a day  Change Gabapentin to 300 mg twice a day  If tolerated, try 300 mg at bedtime  If not, then stay with 300 mg 3 times a day  Limit Furosemide to 20 mg once a day  Check with your insurance company about coverage for new Shingles vaccine, which is going to be available at the beginning of next year  Norco an be habit-forming. It should be taken as prescribed. Do not mix it  with alcohol. Be careful with driving.Do not loose the  Prescription.  Do not overuse this medication. It is a controlled substance.  Follow up in 6 weeks  Seek sooner medical attention if there is any worsening of symptoms or problems.        The information in this document, created by the medical scribe for me, accurately reflects the services I personally performed and the decisions made by me. I have reviewed and approved this document for accuracy prior to leaving the patient care area.  December 5, 2017 2:11 PM    Neisha Esparza MD  [facility]

## 2017-12-05 NOTE — MR AVS SNAPSHOT
After Visit Summary   12/5/2017    Helene Gibbs    MRN: [id number]           Patient Information     Date Of Birth          1935        Visit Information        Provider Department      12/5/2017 1:30 PM Neisha Esparza MD [facility]        Today's Diagnoses     Edema of lower extremity    -  1    Postherpetic neuralgia        CKD (chronic kidney disease) stage 3, GFR 30-59 ml/min        Chronic midline low back pain with sciatica, sciatica laterality unspecified        Candidal intertrigo        Intractable back pain        Scoliosis of lumbar spine, unspecified scoliosis type          Care Instructions    I refilled your prescriptions  Dry the area of fungal infection after showering  Put nystatin powder twice a day  Change Gabapentin to 300 mg twice a day  If tolerated, try 300 mg at bedtime  If not, then stay with 300 mg 3 times a day  Patient was educated about opiod pain medication. It can be habit-forming. It should be taken as prescribed. Do not mix it with alcohol. Be careful with driving. Do not lose the prescription. Do not overuse this medication. It is a controlled substance.  Limit Furosemide to 20 mg once a day  Check with your insurance company about coverage for new Shingles vaccine, which is going to be available at the beginning of next year  Norco an be habit-forming. It should be taken as prescribed. Do not mix it  with alcohol. Be careful with driving.Do not loose the  Prescription.  Do not overuse this medication. It is a controlled substance.  Follow up in 6 weeks  Seek sooner medical attention if there is any worsening of symptoms or problems.              Follow-ups after your visit        Your next 10 appointments already scheduled     Jan 09, 2018  1:30 PM CST   Return Visit with  Oncology Nurse   [facility] ([facility])    [facility]  [address]  [facility] [address]   [phone]     "        Jan 16, 2018  2:00 PM CST   Return Visit with Estiven Cali MD   [facility] ([facility])    [facility]  [address]  [place] [id number] [phone]            Jan 23, 2018  1:15 PM CST   [place] with STONE TECH1   [facility] ([facility])    [address]  [place] [id number] [phone]              Who to contact     If you have questions or need follow up information about today's clinic visit or your schedule please contact [facility] directly at [phone].  Normal or non-critical lab and imaging results will be communicated to you by MyChart, letter or phone within 4 business days after the clinic has received the results. If you do not hear from us within 7 days, please contact the clinic through MyChart or phone. If you have a critical or abnormal lab result, we will notify you by phone as soon as possible.  Submit refill requests through [employer] or call your pharmacy and they will forward the refill request to us. Please allow 3 business days for your refill to be completed.          Additional Information About Your Visit        Care EveryWhere ID     This is your Care EveryWhere ID. This could be used by other organizations to access your [facility] medical records  [id number]        Your Vitals Were     Pulse Temperature Height Last Period Pulse Oximetry BMI (Body Mass Index)    73 97.6  F (36.4  C) (Oral) 5' 6\" (1.676 m) (LMP Unknown) 98% 26.79 kg/m2       Blood Pressure from Last 3 Encounters:   12/05/17 131/73   12/04/17 147/72   12/02/17 (!) 161/93    Weight from Last 3 Encounters:   12/05/17 166 lb (75.3 kg)   11/10/17 163 lb (73.9 kg)   11/01/17 163 lb (73.9 kg)              Today, you had the following     No orders found for display         Today's Medication Changes          These changes are accurate as of: 12/5/17  2:09 PM.  If you have " any questions, ask your nurse or doctor.               These medicines have changed or have updated prescriptions.        Dose/Directions    * nystatin 009454 UNIT/GM Powd   Commonly known as:  MYCOSTATIN   This may have changed:  Another medication with the same name was added. Make sure you understand how and when to take each.   Used for:  Rash   Changed by:  Neisha Esparza MD        Apply topically 2 times daily as needed   Quantity:  60 g   Refills:  1       * nystatin 273074 UNIT/GM Powd   Commonly known as:  MYCOSTATIN   This may have changed:  You were already taking a medication with the same name, and this prescription was added. Make sure you understand how and when to take each.   Used for:  Candidal intertrigo   Changed by:  Neisha Esparza MD        Apply topically 2 times daily   Quantity:  60 g   Refills:  1       * Notice:  This list has 2 medication(s) that are the same as other medications prescribed for you. Read the directions carefully, and ask your doctor or other care provider to review them with you.         Where to get your medicines      These medications were sent to [facility]/pharmacy #[id number] - [place] MITCHELL  [address]  [address] [facility] [address]     Phone:  [phone]     nystatin [id number] UNIT/GM Powd         Some of these will need a paper prescription and others can be bought over the counter.  Ask your nurse if you have questions.     Bring a paper prescription for each of these medications     HYDROcodone-acetaminophen 5-325 MG per tablet                Primary Care Provider Office Phone # Fax #    Neisha Esparza [phone] [address] [facility] [address]  [facility] [address]        Goals        General    start date: 4/17/14 I will weigh myself daily and if any weight gain or shortness of breath I will call the clinic. (pt-stated)     Notes - Note created  4/17/2014  3:59 PM by Margareth Pichardo, RN    As of today's date 4/17/2014 goal is  met at 0 - 25%.   Goal Status:  Active        Equal Access to Services     BALWINDERSAMRA GARRETT : Hadii sukhdeep mercer no Brady, washirleneda ludelvis, galileota himanshu chasmaik, pamela mariin hayaakristina dohertytori farris sadia . So [facility] [phone].    ATENCIÓN: Si habla español, tiene a gupta disposición servicios gratuitos de asistencia lingüística. Llame al [phone].    We comply with applicable federal civil rights laws and [place] laws. We do not discriminate on the basis of race, color, national origin, age, disability, sex, sexual orientation, or gender identity.            Thank you!     Thank you for choosing [facility]  for your care. Our goal is always to provide you with excellent care. Hearing back from our patients is one way we can continue to improve our services. Please take a few minutes to complete the written survey that you may receive in the mail after your visit with us. Thank you!             Your Updated Medication List - Protect others around you: Learn how to safely use, store and throw away your medicines at www.disposemymeds.org.          This list is accurate as of: 12/5/17  2:09 PM.  Always use your most recent med list.                   Brand Name Dispense Instructions for use Diagnosis    ACCU-CHEK SMARTVIEW test strip   Generic drug:  blood glucose monitoring     100 each    1 strip by In Vitro route 2 times daily Or as directed    Type 2 diabetes mellitus with diabetic nephropathy (H)       albuterol 108 (90 BASE) MCG/ACT Inhaler    PROAIR HFA/PROVENTIL HFA/VENTOLIN HFA    1 Inhaler    Inhale 2 puffs into the lungs every 4 hours as needed for shortness of breath / dyspnea or wheezing    COPD (chronic obstructive pulmonary disease) (H)       apixaban ANTICOAGULANT 2.5 MG tablet    ELIQUIS    60 tablet    Take 1 tablet (2.5 mg) by mouth 2 times daily    Atrial fibrillation, unspecified type (H)       betamethasone valerate 0.1 % cream    VALISONE    15 g    APPLY TOPICALLY 2 TIMES DAILY  USE SPARINGLY    Rash       blood glucose calibration solution    no brand specified    1 each    Use to calibrate blood glucose monitor as directed. Please dispense brand of solution that works with patient's current meter    Type 2 diabetes mellitus with diabetic nephropathy, without long-term current use of insulin (H)       blood glucose monitoring lancets     1 Box    USE TO TEST BLOOD SUGAR TWO TIMES A DAY OR AS DIRECTED    Type 2 diabetes mellitus with diabetic nephropathy (H)       COMPOUNDED NON-CONTROLLED SUBSTANCE - PHARMACY TO MIX COMPOUNDED MEDICATION    CMPD RX    42 suppository    Vaginal valium- 10 mg suppository    Vaginal pain, Pelvic floor tension, Urinary retention       fluticasone 50 MCG/ACT spray    FLONASE    16 mL    INHALE 1 TO 2 SPRAYS INTO BOTH NOSTRILS DAILY    Rhinitis, unspecified type       furosemide 20 MG tablet    LASIX    180 tablet    TAKE 1-2 TABS BY MOUTH ONCE DAILY. MAY REPEAT AFTER NOON IF NEEDED FOR WEIGHT GAIN/2+ EDEMA    Swelling       gabapentin 300 MG capsule    NEURONTIN    180 capsule    Take 2 capsules (600 mg) by mouth 3 times daily    Neuropathy       glipiZIDE 5 MG tablet    GLUCOTROL    135 tablet    TAKE 1 TABLET BY MOUTH EVERY MORNING AND 0.5 TABLET EVERY EVENING    Type 2 diabetes mellitus with diabetic nephropathy, without long-term current use of insulin (H)       HYDROcodone-acetaminophen 5-325 MG per tablet   Start taking on:  12/20/2017    NORCO    90 tablet    Take 1 tablet by mouth every 6 hours as needed for moderate to severe pain    Intractable back pain, Scoliosis of lumbar spine, unspecified scoliosis type, Chronic midline low back pain with sciatica, sciatica laterality unspecified       hydrocortisone 2.5 % cream    ANUSOL-HC    28 g    Place rectally 2 times daily as needed for hemorrhoids    External hemorrhoids       hydrOXYzine 25 MG capsule    VISTARIL    180 capsule    Take 1 capsule (25 mg) by mouth 2 times daily as needed for itching     Itching       * lidocaine-prilocaine cream    EMLA    30 g    Apply topically as needed for moderate pain    Post herpetic neuralgia, Myofascial pain       * lidocaine-prilocaine cream    EMLA    30 g    Apply topically as needed for moderate pain    Myofascial pain, Post herpetic neuralgia       lisinopril 10 MG tablet    PRINIVIL/ZESTRIL    90 tablet    Take 1 tablet (10 mg) by mouth daily    Essential hypertension       magnesium hydroxide 400 MG/5ML suspension    MILK OF MAGNESIA     Take 30 mLs by mouth At Bedtime        * nystatin [id number] UNIT/GM Powd    MYCOSTATIN    60 g    Apply topically 2 times daily as needed    Rash       * nystatin 823183 UNIT/GM Powd    MYCOSTATIN    60 g    Apply topically 2 times daily    Candidal intertrigo       ondansetron 4 MG ODT tab    ZOFRAN ODT    30 tablet    Take 1 tablet (4 mg) by mouth every 6 hours as needed for nausea    Nausea       order for DME     1 each    Equipment being ordered: AccuChek Smart View strips Patient tests twice daily.    Type II or unspecified type diabetes mellitus without mention of complication, not stated as uncontrolled       PRESERVISION/LUTEIN Caps      Take 1 capsule by mouth daily        ranitidine 150 MG tablet    ZANTAC    60 tablet    Take 1 tablet (150 mg) by mouth 2 times daily    Dyspepsia       simvastatin 10 MG tablet    ZOCOR    90 tablet    Take 1 tablet (10 mg) by mouth At Bedtime    Type 2 diabetes mellitus with diabetic nephropathy, without long-term current use of insulin (H)       tiotropium 18 MCG capsule    SPIRIVA HANDIHALER    30 capsule    Inhale 1 capsule (18 mcg) into the lungs daily    Chronic obstructive pulmonary disease, unspecified COPD type (H)       traMADol 50 MG tablet    ULTRAM    100 tablet    Take 1 tablet (50 mg) by mouth every 6 hours as needed for moderate pain    Intractable back pain       * Notice:  This list has 4 medication(s) that are the same as other medications prescribed for you. Read the  directions carefully, and ask your doctor or other care provider to review them with you.

## 2017-12-05 NOTE — NURSING NOTE
"Chief Complaint   Patient presents with     Hospital F/U       Initial /73 (BP Location: Left arm, Cuff Size: Adult Large)  Pulse 73  Temp 97.6  F (36.4  C) (Oral)  Ht 5' 6\" (1.676 m)  Wt 166 lb (75.3 kg)  LMP  (LMP Unknown)  SpO2 98%  BMI 26.79 kg/m2 Estimated body mass index is 26.79 kg/(m^2) as calculated from the following:    Height as of this encounter: 5' 6\" (1.676 m).    Weight as of this encounter: 166 lb (75.3 kg).  Medication Reconciliation: complete     Lian Jones MA    "

## 2017-12-21 ENCOUNTER — TRANSFERRED RECORDS (OUTPATIENT)
Dept: HEALTH INFORMATION MANAGEMENT | Facility: CLINIC | Age: 82
End: 2017-12-21

## 2017-12-21 ENCOUNTER — TELEPHONE (OUTPATIENT)
Dept: OTHER | Facility: CLINIC | Age: 82
End: 2017-12-21

## 2017-12-21 DIAGNOSIS — I73.9 PERIPHERAL VASCULAR DISEASE (H): ICD-10-CM

## 2017-12-21 DIAGNOSIS — I70.229 CRITICAL LOWER LIMB ISCHEMIA (H): Primary | ICD-10-CM

## 2017-12-21 NOTE — TELEPHONE ENCOUNTER
Pt referred to [facility] by Dr. Ernst ([facility], ph. [phone]) for critical limb ischemia.    Former pt of .  Pt had EMIGDIO 2/17/15:  FINDINGS: Resting EMIGDIO in the right lower extremity is 1.15 and 1.21 in  the left lower extremity. Both common femoral arterial waveforms are  triphasic. Both popliteal, PT, and DP waveforms are predominantly  biphasic/triphasic.    Pt needs to be scheduled for EMIGDIO with exercise and consult with vascular surgery.  Will route to scheduling to coordinate an appointment ASAP.    Holly Lucas RN BSN

## 2017-12-21 NOTE — TELEPHONE ENCOUNTER
Left message on home number to schedule consult & EMIGDIO for critical limb ischemia. Attempted to call mobile number but line was busy.

## 2017-12-22 ENCOUNTER — HOSPITAL ENCOUNTER (OUTPATIENT)
Facility: CLINIC | Age: 82
End: 2017-12-22
Admitting: SURGERY

## 2017-12-22 ENCOUNTER — OFFICE VISIT (OUTPATIENT)
Dept: OTHER | Facility: CLINIC | Age: 82
End: 2017-12-22
Attending: SURGERY
Payer: MEDICARE

## 2017-12-22 ENCOUNTER — HOSPITAL ENCOUNTER (OUTPATIENT)
Dept: ULTRASOUND IMAGING | Facility: CLINIC | Age: 82
Discharge: HOME | End: 2017-12-22
Attending: SURGERY | Admitting: SURGERY
Payer: MEDICARE

## 2017-12-22 VITALS
OXYGEN SATURATION: 96 % | HEIGHT: 66 IN | BODY MASS INDEX: 26.68 KG/M2 | WEIGHT: 166 LBS | SYSTOLIC BLOOD PRESSURE: 105 MMHG | DIASTOLIC BLOOD PRESSURE: 56 MMHG | HEART RATE: 69 BPM

## 2017-12-22 DIAGNOSIS — I70.229 CRITICAL ISCHEMIA OF LOWER EXTREMITY (H): Primary | ICD-10-CM

## 2017-12-22 DIAGNOSIS — I70.229 CRITICAL LOWER LIMB ISCHEMIA (H): ICD-10-CM

## 2017-12-22 DIAGNOSIS — I73.9 PERIPHERAL VASCULAR DISEASE (H): ICD-10-CM

## 2017-12-22 PROCEDURE — 93922 UPR/L XTREMITY ART 2 LEVELS: CPT

## 2017-12-22 PROCEDURE — 99211 OFF/OP EST MAY X REQ PHY/QHP: CPT

## 2017-12-22 PROCEDURE — 99214 OFFICE O/P EST MOD 30 MIN: CPT | Mod: ZP | Performed by: SURGERY

## 2017-12-22 RX ORDER — CLINDAMYCIN HCL 300 MG
300 CAPSULE ORAL 2 TIMES DAILY
Refills: 1 | Status: ON HOLD | COMMUNITY
Start: 2017-12-15 | End: 2018-01-04

## 2017-12-22 NOTE — NURSING NOTE
Patient Education    Procedure: left lower extremity angiogram  Diagnosis: PAD with critical limg ischemia  Anticoagulation Instruction: hold eliquis for 48 hours  Pre-Operative Physical Exam: You need to have a pre-op physical exam within 30 days of your procedure. Your procedure may be cancelled if you do not have a current History and Physical. Call your PCP's office to schedule.  Allergies:  Updated in Epic  Bowel Prep: NA  Post Procedure Education: [facility] patient post-procedure fact sheet reviewed with patient.    Learner(s):patient  Method: Listening, Reading  Barriers to Learning:No Barrier  Outcome: Patient did verbalize understanding of above education.    Holly Lucas, MARIOLAN, RN

## 2017-12-22 NOTE — NURSING NOTE
"Chief Complaint   Patient presents with     Consult     hx of critical limb ischemia       Initial /56 (BP Location: Left arm, Patient Position: Chair, Cuff Size: Adult Large)  Pulse 69  Ht 5' 6\" (1.676 m)  Wt 166 lb (75.3 kg)  LMP  (LMP Unknown)  SpO2 96%  BMI 26.79 kg/m2 Estimated body mass index is 26.79 kg/(m^2) as calculated from the following:    Height as of this encounter: 5' 6\" (1.676 m).    Weight as of this encounter: 166 lb (75.3 kg).  Medication Reconciliation: complete    Face to face time: 7 minutes    Shawanda Juarez CMA    "

## 2017-12-22 NOTE — MR AVS SNAPSHOT
After Visit Summary   12/22/2017    Helene Gibbs    MRN: [id number]           Patient Information     Date Of Birth          1935        Visit Information        Provider Department      12/22/2017 9:30 AM Pieter Watson MD [facility]      Today's Diagnoses     Critical ischemia of lower extremity    -  1       Follow-ups after your visit        Your next 10 appointments already scheduled     Jan 09, 2018  1:30 PM CST   Return Visit with  Oncology Nurse   [facility] ([facility])    [facility]  [address]  [place] [address]   [phone]            Jan 16, 2018  2:00 PM CST   Return Visit with Estiven Cali MD   [facility] ([facility])    [facility]  [address]  [place] [address]   [phone]            Jan 18, 2018  1:30 PM CST   Office Visit with Neisha Esparza MD   [facility] ([facility])    [address] [place] [facility] [place] [address] [phone]           Bring a current list of meds and any records pertaining to this visit. For Physicals, please bring immunization records and any forms needing to be filled out. Please arrive 10 minutes early to complete paperwork.            Jan 23, 2018  1:15 PM CST   [place] with STONE TECH1   [facility] ([facility])    [address]  [place] [place] [facility] [address] [phone]              Future tests that were ordered for you today     Open Future Orders        Priority Expected Expires Ordered    US EMIGDIO Doppler No Exercise Routine 12/21/2017 3/1/2018 12/21/2017            Who to contact     If you have questions or need follow up information about today's clinic visit or your schedule please contact [facility] [place] directly at  "[phone].  Normal or non-critical lab and imaging results will be communicated to you by MyChart, letter or phone within 4 business days after the clinic has received the results. If you do not hear from us within 7 days, please contact the clinic through MyChart or phone. If you have a critical or abnormal lab result, we will notify you by phone as soon as possible.  Submit refill requests through [employer] or call your pharmacy and they will forward the refill request to us. Please allow 3 business days for your refill to be completed.          Additional Information About Your Visit        Care EveryWhere ID     This is your Care EveryWhere ID. This could be used by other organizations to access your [place] medical records  [id number]        Your Vitals Were     Pulse Height Last Period Pulse Oximetry BMI (Body Mass Index)       69 1.676 m (5' 6\") (LMP Unknown) 96% 26.79 kg/m2        Blood Pressure from Last 3 Encounters:   12/22/17 105/56   12/05/17 131/73   12/04/17 147/72    Weight from Last 3 Encounters:   12/22/17 75.3 kg (166 lb)   12/05/17 75.3 kg (166 lb)   11/10/17 73.9 kg (163 lb)              Today, you had the following     No orders found for display       Primary Care Provider Office Phone # Fax #    Neisha MINAYA MD [place] [phone] [address]  [place]                [place] [address]        Goals        General    start date: 4/17/14 I will weigh myself daily and if any weight gain or shortness of breath I will call the clinic. (pt-stated)     Notes - Note created  4/17/2014  3:59 PM by Margareth Pichardo, RN    As of today's date 4/17/2014 goal is met at 0 - 25%.   Goal Status:  Active        Equal Access to Services     [facility] GARRETT : Yariel Brady, wavielka valentino, qaybta kaalbarbie castañeda, pamela nolasco. [facility] [phone].    ATENCIÓN: Si habla español, tiene a gupta disposición servicios gratuitos de asistencia " lingüísticaMaryam Pan al [phone].    We comply with applicable federal civil rights laws and [place] laws. We do not discriminate on the basis of race, color, national origin, age, disability, sex, sexual orientation, or gender identity.            Thank you!     Thank you for choosing [facility] [place]  for your care. Our goal is always to provide you with excellent care. Hearing back from our patients is one way we can continue to improve our services. Please take a few minutes to complete the written survey that you may receive in the mail after your visit with us. Thank you!             Your Updated Medication List - Protect others around you: Learn how to safely use, store and throw away your medicines at www.disposemymeds.org.          This list is accurate as of: 12/22/17 10:09 AM.  Always use your most recent med list.                   Brand Name Dispense Instructions for use Diagnosis    ACCU-CHEK SMARTVIEW test strip   Generic drug:  blood glucose monitoring     100 each    1 strip by In Vitro route 2 times daily Or as directed    Type 2 diabetes mellitus with diabetic nephropathy (H)       albuterol 108 (90 BASE) MCG/ACT Inhaler    PROAIR HFA/PROVENTIL HFA/VENTOLIN HFA    1 Inhaler    Inhale 2 puffs into the lungs every 4 hours as needed for shortness of breath / dyspnea or wheezing    COPD (chronic obstructive pulmonary disease) (H)       apixaban ANTICOAGULANT 2.5 MG tablet    ELIQUIS    60 tablet    Take 1 tablet (2.5 mg) by mouth 2 times daily    Atrial fibrillation, unspecified type (H)       betamethasone valerate 0.1 % cream    VALISONE    15 g    APPLY TOPICALLY 2 TIMES DAILY USE SPARINGLY    Rash       blood glucose calibration solution    no brand specified    1 each    Use to calibrate blood glucose monitor as directed. Please dispense brand of solution that works with patient's current meter    Type 2 diabetes mellitus with diabetic nephropathy, without long-term current  use of insulin (H)       blood glucose monitoring lancets     1 Box    USE TO TEST BLOOD SUGAR TWO TIMES A DAY OR AS DIRECTED    Type 2 diabetes mellitus with diabetic nephropathy (H)       clindamycin 300 MG capsule    CLEOCIN     Take 300 mg by mouth 3 times daily        COMPOUNDED NON-CONTROLLED SUBSTANCE - PHARMACY TO MIX COMPOUNDED MEDICATION    CMPD RX    42 suppository    Vaginal valium- 10 mg suppository    Vaginal pain, Pelvic floor tension, Urinary retention       fluticasone 50 MCG/ACT spray    FLONASE    16 mL    INHALE 1 TO 2 SPRAYS INTO BOTH NOSTRILS DAILY    Rhinitis, unspecified type       furosemide 20 MG tablet    LASIX    180 tablet    TAKE 1-2 TABS BY MOUTH ONCE DAILY. MAY REPEAT AFTER NOON IF NEEDED FOR WEIGHT GAIN/2+ EDEMA    Swelling       gabapentin 300 MG capsule    NEURONTIN    180 capsule    Take 2 capsules (600 mg) by mouth 3 times daily    Neuropathy       glipiZIDE 5 MG tablet    GLUCOTROL    135 tablet    TAKE 1 TABLET BY MOUTH EVERY MORNING AND 0.5 TABLET EVERY EVENING    Type 2 diabetes mellitus with diabetic nephropathy, without long-term current use of insulin (H)       HYDROcodone-acetaminophen 5-325 MG per tablet    NORCO    90 tablet    Take 1 tablet by mouth every 6 hours as needed for moderate to severe pain    Intractable back pain, Scoliosis of lumbar spine, unspecified scoliosis type, Chronic midline low back pain with sciatica, sciatica laterality unspecified       hydrocortisone 2.5 % cream    ANUSOL-HC    28 g    Place rectally 2 times daily as needed for hemorrhoids    External hemorrhoids       hydrOXYzine 25 MG capsule    VISTARIL    180 capsule    Take 1 capsule (25 mg) by mouth 2 times daily as needed for itching    Itching       * lidocaine-prilocaine cream    EMLA    30 g    Apply topically as needed for moderate pain    Post herpetic neuralgia, Myofascial pain       * lidocaine-prilocaine cream    EMLA    30 g    Apply topically as needed for moderate pain     Myofascial pain, Post herpetic neuralgia       lisinopril 10 MG tablet    PRINIVIL/ZESTRIL    90 tablet    Take 1 tablet (10 mg) by mouth daily    Essential hypertension       magnesium hydroxide 400 MG/5ML suspension    MILK OF MAGNESIA     Take 30 mLs by mouth At Bedtime        * nystatin 045347 UNIT/GM Powd    MYCOSTATIN    60 g    Apply topically 2 times daily as needed    Rash       * nystatin 146212 UNIT/GM Powd    MYCOSTATIN    60 g    Apply topically 2 times daily    Candidal intertrigo       ondansetron 4 MG ODT tab    ZOFRAN ODT    30 tablet    Take 1 tablet (4 mg) by mouth every 6 hours as needed for nausea    Nausea       order for DME     1 each    Equipment being ordered: AccuChek Smart View strips Patient tests twice daily.    Type II or unspecified type diabetes mellitus without mention of complication, not stated as uncontrolled       PRESERVISION/LUTEIN Caps      Take 1 capsule by mouth daily        ranitidine 150 MG tablet    ZANTAC    60 tablet    Take 1 tablet (150 mg) by mouth 2 times daily    Dyspepsia       simvastatin 10 MG tablet    ZOCOR    90 tablet    TAKE 1 TABLET (10 MG) BY MOUTH AT BEDTIME    Type 2 diabetes mellitus with diabetic nephropathy, without long-term current use of insulin (H)       tiotropium 18 MCG capsule    SPIRIVA HANDIHALER    30 capsule    Inhale 1 capsule (18 mcg) into the lungs daily    Chronic obstructive pulmonary disease, unspecified COPD type (H)       traMADol 50 MG tablet    ULTRAM    100 tablet    Take 1 tablet (50 mg) by mouth every 6 hours as needed for moderate pain    Intractable back pain       * Notice:  This list has 4 medication(s) that are the same as other medications prescribed for you. Read the directions carefully, and ask your doctor or other care provider to review them with you.

## 2017-12-26 NOTE — PROGRESS NOTES
DATE OF VISIT:  2017      PRESENTING COMPLAINT:  Left foot pain.      HISTORY OF PRESENT ILLNESS:  Ms. Helene Gibbs is an 82-year-old patient who I have been asked to see for further evaluation of left foot pain in particular the left little toe which she says has been painful and getting increasingly discolored over the past few weeks.  She also reports pain at night in the forefoot area which has been getting worse, does not describe claudication.      PAST MEDICAL HISTORY:   1.  Degenerative joint disease.   2.  Pulmonary hypertension.   3.  Lumbar stenosis.   4.  Hypertension.   5.  Hyperlipidemia.   6.  Gastroesophageal reflux disease.    7.  Chronic obstructive pulmonary disease.   8.  Congestive cardiac failure.    9.  Diabetes mellitus.   10.  History of breast cancer.      PAST SURGICAL HISTORY:   1.  Cataract surgery.   2.  Left breast lumpectomy.   3.  Laparoscopic cholecystectomy.   4.  Bilateral total knee arthroplasties.   5.  Ablation of AV node.     6.  Bilateral shoulder arthroscopy.      SOCIAL HISTORY:  She does not have any children.  She is close to her niece who also lives in [place].  Patient still smokes about one-quarter pack per day and has been smoking for 45 years.       FAMILY HISTORY:  Both parents are .  Father had coronary artery disease and mother had diabetes and hypertension.      REVIEW OF SYSTEMS:  Also noted for edema of lower extremities and degenerative joint disease.  Also noted for shortness of breath.  Otherwise, review of systems negative.      PHYSICAL EXAMINATION:   GENERAL:  She appears comfortable.  She is in no acute distress.   VITAL SIGNS:  Reviewed.   HEENT:  Head is atraumatic, normocephalic, mucosa are pink.   EYES:  Extraocular motions are intact.  Sclerae are anicteric.   MENTAL:  Alert and oriented.  Judgment and insight are good.   LYMPHATIC:  No supraclavicular or cervical adenopathy noted.   CARDIOVASCULAR:  Regular rate and rhythm.  S1  plus S2+0.   RESPIRATORY:  Good air entry bilaterally.  Good respiratory effort, no accessory muscles are being used.   ABDOMEN:  Soft, nontender, obese, no hepatosplenomegaly noted.   EXTREMITIES:  No clubbing in the left little toe.  There is dusky discoloration and tenderness.  There is dependent rubor in the forefoot.  She has edema in both lower extremities up to the knees.  There are well-healed scar from arthroplasty procedure.   VASCULAR:  No carotid bruits.  3+ bilateral radial and femoral pulses.  Right dorsalis pedis and posterior tibial are triphasic.  Left dorsalis pedis and posterior tibial are monophasic.      IMAGING DATA:  She underwent noninvasive studies today which showed that the right ankle-brachial index 0.710 and 0.98, on the left side at 0.41 and 0.64.      DIAGNOSIS:  Left lower extremity peripheral artery disease with ischemic rest pain in the left forefoot, critical limb ischemia, Vidya, grade 4.      PLAN:  I explained the pathophysiology of disease to her in detail.  This is due to her smoking and diabetes along with other risk factors such as hyperlipidemia and hypertension.  We will plan for a left lower extremity arteriogram and intervention as necessary for limb salvage.  This was explained to her in detail and she has verbalized understanding.  We can schedule as our schedule permits.        20.      D:.         LAWRENCE SNELL MD             D: 2017 10:08   T: 2017 07:05   MT: SHARMIN      Name:     PATRICIA BOBO   MRN:      [id number]        Account:      [id number]   :      1935           Visit Date:   2017      Document: [id number]

## 2017-12-27 ENCOUNTER — DOCUMENTATION ONLY (OUTPATIENT)
Dept: OTHER | Facility: CLINIC | Age: 82
End: 2017-12-27

## 2017-12-28 ENCOUNTER — OFFICE VISIT (OUTPATIENT)
Dept: FAMILY MEDICINE | Facility: CLINIC | Age: 82
End: 2017-12-28
Payer: MEDICARE

## 2017-12-28 VITALS
SYSTOLIC BLOOD PRESSURE: 139 MMHG | OXYGEN SATURATION: 98 % | TEMPERATURE: 97 F | HEART RATE: 70 BPM | DIASTOLIC BLOOD PRESSURE: 80 MMHG | WEIGHT: 161 LBS | HEIGHT: 66 IN | BODY MASS INDEX: 25.88 KG/M2

## 2017-12-28 DIAGNOSIS — J44.9 CHRONIC OBSTRUCTIVE PULMONARY DISEASE, UNSPECIFIED COPD TYPE (H): ICD-10-CM

## 2017-12-28 DIAGNOSIS — E11.21 TYPE 2 DIABETES MELLITUS WITH DIABETIC NEPHROPATHY, WITHOUT LONG-TERM CURRENT USE OF INSULIN (H): ICD-10-CM

## 2017-12-28 DIAGNOSIS — I70.229 CRITICAL ISCHEMIA OF LOWER EXTREMITY (H): ICD-10-CM

## 2017-12-28 DIAGNOSIS — Z72.0 TOBACCO ABUSE: ICD-10-CM

## 2017-12-28 DIAGNOSIS — Z01.818 PREOP GENERAL PHYSICAL EXAM: Primary | ICD-10-CM

## 2017-12-28 DIAGNOSIS — Z95.0 CARDIAC PACEMAKER IN SITU: ICD-10-CM

## 2017-12-28 PROCEDURE — 99215 OFFICE O/P EST HI 40 MIN: CPT | Performed by: NURSE PRACTITIONER

## 2017-12-28 NOTE — MR AVS SNAPSHOT
After Visit Summary   12/28/2017    Helene Gibbs    MRN: [id number]           Patient Information     Date Of Birth          1935        Visit Information        Provider Department      12/28/2017 1:30 PM Tip Sheppard APRN [facility]        Today's Diagnoses     Preop general physical exam    -  1    Critical ischemia of lower extremity        Chronic obstructive pulmonary disease, unspecified COPD type (H)        Type 2 diabetes mellitus with diabetic nephropathy, without long-term current use of insulin (H)        Tobacco abuse: [age]y/o on 8-12-14 @ 1ppd=50 pk yr hx         Cardiac pacemaker in situ          Care Instructions      Before Your Surgery      Call your surgeon if there is any change in your health. This includes signs of a cold or flu (such as a sore throat, runny nose, cough, rash or fever).    Do not smoke, drink alcohol or take over the counter medicine (unless your surgeon or primary care doctor tells you to) for the 24 hours before and after surgery.    If you take prescribed drugs: Follow your doctor s orders about which medicines to take and which to stop until after surgery.    Eating and drinking prior to surgery: follow the instructions from your surgeon    Take a shower or bath the night before surgery. Use the soap your surgeon gave you to gently clean your skin. If you do not have soap from your surgeon, use your regular soap. Do not shave or scrub the surgery site.  Wear clean pajamas and have clean sheets on your bed.     Do no take the eliquis for 48 hours before surgery   Do not take any medications the morning of surgery           Follow-ups after your visit        Your next 10 appointments already scheduled     Jan 04, 2018 12:00 PM CST   (Arrive by 11:45 AM)   IR LOWER EXTREMITY ANGIOGRAM LEFT with SHIR2   [facility] ([facility])    [address] [address]    [phone]           1. Your doctor will need to do a history and physical within 30 days before this procedure. 2. Your doctor will determine whether you need a 12 lead EKG, as well as which medications should not be taken the morning of the exam. 3. Laboratory tests are to be obtained by your doctor prior to the exam (creatinine, Hgb/Hct, platelet count, and INR) 4. If you have allergies to x-ray contrast or iodine, contact your doctor or a Radiology nurse prior to the exam day for instructions. 5. Someone will need to drive you to and from the hospital. 6. Bring a list of all drugs you are taking; include supplements and over-the-counter medications. 7. Wear comfortable clothes and leave your valuables at home. 8. If you are or may be pregnant, contact your doctor or a Radiology nurse prior to the day of the exam. 9.  If you have diabetes, check with your doctor or a Radiology nurse to see if your insulin needs to be adjusted for the exam. 10. If you are taking Coumadin (to thin you blood) please contact your doctor or a Radiology nurse at least 5 days before the exam for special instructions. 11. You should not have received barium (x-ray contrast) within 48 hours of this exam. 12. The day before your exam you may eat your regular diet and are encouraged to drink at least 2 quarts of clear liquids. Drink no alcoholic beverages for 24 hours prior to the exam. 13. If you have a colostomy you will need to irrigate it with tap water at 8PM the evening before and again at 6AM the morning of the exam. 14. Do not smoke for 24 hours prior to the procedure. 15. Do not eat any solid food or milk products for 6 hours prior to the exam. You may drink clear liquids until 2 hours prior to the exam. Clear liquids include the following: water, Jell-O, clear broth, apple juice or any non-carbonated drink that you can see through (no pop!) 16. The morning of the exam you may brush your teeth and take medications as directed  with a sip of water. 17. Tell the Radiology nurse if you have any allergies. 18. You will be asked to empty your bladder before the exam begins. 19. Following the exam you will need to remain on complete bedrest for 4-6 hours. The nurse will monitor your vital signs, puncture site, and the pulses and temperature of the arm or leg that was punctured. 20. When discharged, you cannot drive until morning, and an adult must be with you until then. You should stay in the [place] area overnight.            Jan 04, 2018 12:00 PM CST   [facility] IR Suite with Pieter Watson MD   [facility] ([facility])    [facility] ([facility])   [phone]            Jan 09, 2018  1:30 PM CST   Return Visit with  Oncology Nurse   [facility] ([facility])    [facility]  [address] [facility] [address]  [place] [address] [phone]            Jan 16, 2018  2:00 PM CST   Return Visit with Estiven Cali MD   [facility] ([facility])    [facility]  [address] [address]  [place] [facility] [id number] [phone]            Jan 18, 2018  1:30 PM CST   [facility] with Neisha [facility]za [facility]   [facility]    [address] [facility] [id number] [phone]           Bring a current list of meds and any records pertaining to this visit. For Physicals, please bring immunization records and any forms needing to be filled out. Please arrive 10 minutes early to complete paperwork.            Jan 23, 2018  1:15 PM CST   [place] with STONE TECH1   [facility] ([facility])    [address]  [place] [id number] [phone]              Who to contact     If you have questions or need follow up information about today's clinic visit or your  "schedule please contact [facility] directly at [phone].  Normal or non-critical lab and imaging results will be communicated to you by MyChart, letter or phone within 4 business days after the clinic has received the results. If you do not hear from us within 7 days, please contact the clinic through MyChart or phone. If you have a critical or abnormal lab result, we will notify you by phone as soon as possible.  Submit refill requests through [facility] or call your pharmacy and they will forward the refill request to us. Please allow 3 business days for your refill to be completed.          Additional Information About Your Visit        Care EveryWhere ID     This is your Care EveryWhere ID. This could be used by other organizations to access your [place] medical records  [id number]        Your Vitals Were     Pulse Temperature Height Last Period Pulse Oximetry BMI (Body Mass Index)    70 97  F (36.1  C) (Oral) 5' 6\" (1.676 m) (LMP Unknown) 98% 25.99 kg/m2       Blood Pressure from Last 3 Encounters:   12/28/17 139/80   12/22/17 105/56   12/05/17 131/73    Weight from Last 3 Encounters:   12/28/17 161 lb (73 kg)   12/22/17 166 lb (75.3 kg)   12/05/17 166 lb (75.3 kg)              Today, you had the following     No orders found for display         Today's Medication Changes          These changes are accurate as of: 12/28/17  2:16 PM.  If you have any questions, ask your nurse or doctor.               These medicines have changed or have updated prescriptions.        Dose/Directions    lidocaine-prilocaine cream   Commonly known as:  EMLA   This may have changed:  Another medication with the same name was removed. Continue taking this medication, and follow the directions you see here.   Used for:  Myofascial pain, Post herpetic neuralgia   Changed by:  Victor Hugo Bhardwaj MD        Apply topically as needed for moderate pain   Quantity:  30 g   Refills:  1       nystatin 291685 UNIT/GM " Powd   Commonly known as:  MYCOSTATIN   This may have changed:  Another medication with the same name was removed. Continue taking this medication, and follow the directions you see here.   Used for:  Candidal intertrigo   Changed by:  Neisha Esparza MD        Apply topically 2 times daily   Quantity:  60 g   Refills:  1                Primary Care Provider Office Phone # Fax #    Neisha Esparza [phone] [address]  [facility] [address]        Goals        General    start date: 4/17/14 I will weigh myself daily and if any weight gain or shortness of breath I will call the clinic. (pt-stated)     Notes - Note created  4/17/2014  3:59 PM by Margareth Pichardo RN    As of today's date 4/17/2014 goal is met at 0 - 25%.   Goal Status:  Active        Equal Access to Services     [facility]: Hadii aad ku hadasho Caitlyn, waaxda luqadaha, qaybta kaalmada adetoriyada, waxay mariin hayaan adetori mccullough . So [facility] [phone].    ATENCIÓN: Si habla español, tiene a gupta disposición servicios gratuitos de asistencia lingüística. Llame al [phone].    We comply with applicable federal civil rights laws and [place] laws. We do not discriminate on the basis of race, color, national origin, age, disability, sex, sexual orientation, or gender identity.            Thank you!     Thank you for choosing [facility]  for your care. Our goal is always to provide you with excellent care. Hearing back from our patients is one way we can continue to improve our services. Please take a few minutes to complete the written survey that you may receive in the mail after your visit with us. Thank you!             Your Updated Medication List - Protect others around you: Learn how to safely use, store and throw away your medicines at www.disposemymeds.org.          This list is accurate as of: 12/28/17  2:16 PM.  Always use your most recent med list.                    Brand Name Dispense Instructions for use Diagnosis    ACCU-CHEK SMARTVIEW test strip   Generic drug:  blood glucose monitoring     100 each    1 strip by In Vitro route 2 times daily Or as directed    Type 2 diabetes mellitus with diabetic nephropathy (H)       albuterol 108 (90 BASE) MCG/ACT Inhaler    PROAIR HFA/PROVENTIL HFA/VENTOLIN HFA    1 Inhaler    Inhale 2 puffs into the lungs every 4 hours as needed for shortness of breath / dyspnea or wheezing    COPD (chronic obstructive pulmonary disease) (H)       apixaban ANTICOAGULANT 2.5 MG tablet    ELIQUIS    60 tablet    Take 1 tablet (2.5 mg) by mouth 2 times daily    Atrial fibrillation, unspecified type (H)       betamethasone valerate 0.1 % cream    VALISONE    15 g    APPLY TOPICALLY 2 TIMES DAILY USE SPARINGLY    Rash       blood glucose calibration solution    no brand specified    1 each    Use to calibrate blood glucose monitor as directed. Please dispense brand of solution that works with patient's current meter    Type 2 diabetes mellitus with diabetic nephropathy, without long-term current use of insulin (H)       blood glucose monitoring lancets     1 Box    USE TO TEST BLOOD SUGAR TWO TIMES A DAY OR AS DIRECTED    Type 2 diabetes mellitus with diabetic nephropathy (H)       clindamycin 300 MG capsule    CLEOCIN     Take 300 mg by mouth 3 times daily        COMPOUNDED NON-CONTROLLED SUBSTANCE - PHARMACY TO MIX COMPOUNDED MEDICATION    CMPD RX    42 suppository    Vaginal valium- 10 mg suppository    Vaginal pain, Pelvic floor tension, Urinary retention       fluticasone 50 MCG/ACT spray    FLONASE    16 mL    INHALE 1 TO 2 SPRAYS INTO BOTH NOSTRILS DAILY    Rhinitis, unspecified type       furosemide 20 MG tablet    LASIX    180 tablet    TAKE 1-2 TABS BY MOUTH ONCE DAILY. MAY REPEAT AFTER NOON IF NEEDED FOR WEIGHT GAIN/2+ EDEMA    Swelling       gabapentin 300 MG capsule    NEURONTIN    180 capsule    Take 2 capsules (600 mg) by mouth 3 times  daily    Neuropathy       glipiZIDE 5 MG tablet    GLUCOTROL    135 tablet    TAKE 1 TABLET BY MOUTH EVERY MORNING AND 0.5 TABLET EVERY EVENING    Type 2 diabetes mellitus with diabetic nephropathy, without long-term current use of insulin (H)       HYDROcodone-acetaminophen 5-325 MG per tablet    NORCO    90 tablet    Take 1 tablet by mouth every 6 hours as needed for moderate to severe pain    Intractable back pain, Scoliosis of lumbar spine, unspecified scoliosis type, Chronic midline low back pain with sciatica, sciatica laterality unspecified       hydrocortisone 2.5 % cream    ANUSOL-HC    28 g    Place rectally 2 times daily as needed for hemorrhoids    External hemorrhoids       hydrOXYzine 25 MG capsule    VISTARIL    180 capsule    Take 1 capsule (25 mg) by mouth 2 times daily as needed for itching    Itching       lidocaine-prilocaine cream    EMLA    30 g    Apply topically as needed for moderate pain    Myofascial pain, Post herpetic neuralgia       lisinopril 10 MG tablet    PRINIVIL/ZESTRIL    90 tablet    Take 1 tablet (10 mg) by mouth daily    Essential hypertension       magnesium hydroxide 400 MG/5ML suspension    MILK OF MAGNESIA     Take 30 mLs by mouth At Bedtime        nystatin [id number] UNIT/GM Powd    MYCOSTATIN    60 g    Apply topically 2 times daily    Candidal intertrigo       ondansetron 4 MG ODT tab    ZOFRAN ODT    30 tablet    Take 1 tablet (4 mg) by mouth every 6 hours as needed for nausea    Nausea       order for DME     1 each    Equipment being ordered: AccuChek Smart View strips Patient tests twice daily.    Type II or unspecified type diabetes mellitus without mention of complication, not stated as uncontrolled       PRESERVISION/LUTEIN Caps      Take 1 capsule by mouth daily        ranitidine 150 MG tablet    ZANTAC    60 tablet    Take 1 tablet (150 mg) by mouth 2 times daily    Dyspepsia       simvastatin 10 MG tablet    ZOCOR    90 tablet    TAKE 1 TABLET (10 MG) BY MOUTH AT  BEDTIME    Type 2 diabetes mellitus with diabetic nephropathy, without long-term current use of insulin (H)       tiotropium 18 MCG capsule    SPIRIVA HANDIHALER    30 capsule    Inhale 1 capsule (18 mcg) into the lungs daily    Chronic obstructive pulmonary disease, unspecified COPD type (H)       traMADol 50 MG tablet    ULTRAM    100 tablet    Take 1 tablet (50 mg) by mouth every 6 hours as needed for moderate pain    Intractable back pain

## 2017-12-28 NOTE — NURSING NOTE
"Chief Complaint   Patient presents with     Pre-Op Exam       Initial /80 (BP Location: Left arm, Cuff Size: Adult Large)  Pulse 70  Temp 97  F (36.1  C) (Oral)  Ht 5' 6\" (1.676 m)  Wt 161 lb (73 kg)  LMP  (LMP Unknown)  SpO2 98%  BMI 25.99 kg/m2 Estimated body mass index is 25.99 kg/(m^2) as calculated from the following:    Height as of this encounter: 5' 6\" (1.676 m).    Weight as of this encounter: 161 lb (73 kg).  Medication Reconciliation: complete       Nanette Mayers CMA      "

## 2017-12-28 NOTE — PATIENT INSTRUCTIONS
Before Your Surgery      Call your surgeon if there is any change in your health. This includes signs of a cold or flu (such as a sore throat, runny nose, cough, rash or fever).    Do not smoke, drink alcohol or take over the counter medicine (unless your surgeon or primary care doctor tells you to) for the 24 hours before and after surgery.    If you take prescribed drugs: Follow your doctor s orders about which medicines to take and which to stop until after surgery.    Eating and drinking prior to surgery: follow the instructions from your surgeon    Take a shower or bath the night before surgery. Use the soap your surgeon gave you to gently clean your skin. If you do not have soap from your surgeon, use your regular soap. Do not shave or scrub the surgery site.  Wear clean pajamas and have clean sheets on your bed.     Do no take the eliquis for 48 hours before surgery   Do not take any medications the morning of surgery

## 2017-12-28 NOTE — PROGRESS NOTES
[facility]  [address] [facility] [address]  [phone]  Dept: [phone]    PRE-OP EVALUATION:  Today's date: 2017    Helene Gibbs (: 1935) presents for pre-operative evaluation assessment as requested by Walter Eng. He is requires evaluation and anesthesia risk assessment prior to undergoing surgery/procedure for treatment of Critical ischemia of lower extremity  .  Proposed procedure: Left lower extremity arteriogram and intervention as necessary for limb salvage.     Date of Surgery/ Procedure: 2018  Time of Surgery/ Procedure: 10:30 AM  Hospital/Surgical Facility: [facility]  Fax number for surgical facility:   Primary Physician: Neisha Esparza  Type of Anesthesia Anticipated: to be determined    Patient has a Health Care Directive or Living Will:  YES, document scanned.    1. NO - Do you have a history of heart attack, stroke, stent, bypass or surgery on an artery in the head, neck, heart or legs?  2. NO - Do you ever have any pain or discomfort in your chest?  3. NO - Do you have a history of  Heart Failure?  4. NO - Are you troubled by shortness of breath when: walking on the level, up a slight hill or at night?  5. NO - Do you currently have a cold, bronchitis or other respiratory infection?  6. YES- Do you have a cough, shortness of breath or wheezing? Last week had a chest cold and is improved now.    7. NO - Do you sometimes get pains in the calves of your legs when you walk?  8. NO - Do you or anyone in your family have previous history of blood clots?  9. NO - Do you or does anyone in your family have a serious bleeding problem such as prolonged bleeding following surgeries or cuts?  10. YES - Have you ever had problems with anemia or been told to take iron pills? Recent significant anemia that was treated   11. NO - Have you had any abnormal blood loss such as black, tarry or bloody stools, or abnormal vaginal  bleeding?  12. YES - Have you ever had a blood transfusion?  With recent anemia   13. NO - Have you or any of your relatives ever had problems with anesthesia?  14. NO - Do you have sleep apnea, excessive snoring or daytime drowsiness?  15. NO - Do you have any prosthetic heart valves?  16. YES - Do you have prosthetic joints?  Bilateral knees   17. NO - Is there any chance that you may be pregnant?        HPI:                                                      Brief HPI related to upcoming procedure: ischemic left limb.     Had a root canal yesterday. She is feeling fine after this.   Had a chest cold last week that is greatly improved but still has a mild lingering cough.     See problem list for active medical problems.  Problems all longstanding and stable, except as noted/documented.  See ROS for pertinent symptoms related to these conditions.                                                                                                  .    MEDICAL HISTORY:                                                    Patient Active Problem List    Diagnosis Date Noted     Controlled substance agreement signed 11/01/2017     Priority: Medium     Other chronic pain 11/01/2017     Priority: Medium     Patient is followed by Neisha Esparza MD for ongoing prescription of pain medication.  All refills should only be approved by this provider, or covering partner.    Medication(s): norco.   Maximum quantity per month: 90  Clinic visit frequency required: Q 4 months     Controlled substance agreement:  Encounter-Level CSA - 09/27/2016:          Controlled Substance Agreement - Scan on 10/5/2016  1:07 PM : CONTROLLED SUBSTANCE AGREEMENT (below)            Encounter-Level CSA - 09/27/2016:          Controlled Substance Agreement - Scan on 8/29/2014  8:53 AM : CONTROLLED MEDICATION AGREEMENT  8/28/14 (below)              Pain Clinic evaluation in the past: No      DIRE Total Score(s):  No flowsheet data found.    Last [place]  website verification:  none   [email or web address]               Herpes zoster without complication 10/04/2017     Priority: Medium     History of bone marrow biopsy 09/19/2017     Priority: Medium     Bone marrow was done on 8/2017 .  it is hypocellular with only 20% cellularity.  There is no evidence of a leukemia, lymphoma or MDS.  Patient has normochromic, normocytic anemia  anemia is due to multiple factors.  It is due to a combination of hypocellular marrow, chronic kidney disease and anemia due to chronic medical problems       Iron deficiency 09/14/2017     Priority: Medium     Adverse effect of iron 09/14/2017     Priority: Medium     Gastrointestinal hemorrhage, unspecified gastrointestinal hemorrhage type 09/11/2017     Priority: Medium     ACP (advance care planning) 08/29/2017     Priority: Medium     Tubular adenoma 10/10/2016     Priority: Medium     Basal cell carcinoma of skin 09/27/2016     Priority: Medium     Chronic pain syndrome 09/27/2016     Priority: Medium     Patient is followed by Neisha Esparza MD for ongoing prescription of pain medication.  All refills should be approved by this provider, or covering partner.    Medication(s): tramadol.   Maximum quantity per month: # 60  Clinic visit frequency required: Q 3 months     Controlled substance agreement:  Encounter-Level CSA - 8/28/14:               Controlled Substance Agreement - Scan on 8/29/2014  8:53 AM : CONTROLLED MEDICATION AGREEMENT  8/28/14 (below)            Pain Clinic evaluation in the past: No    DIRE Total Score(s):  No flowsheet data found.    Last [facility] website verification:  February 1, 2017     [email or web address]               Gastroesophageal reflux disease without esophagitis 01/19/2016     Priority: Medium     Slow transit constipation 01/15/2016     Priority: Medium     Cholelithiasis with acute on chronic cholecystitis 12/14/2015     Priority: Medium     Nausea and vomiting 12/08/2015      Priority: Medium     Hyperlipidemia 01/13/2015     Priority: Medium     Problem list name updated by automated process. Provider to review       Obesity 09/30/2014     Priority: Medium     Thoracic disc herniation 09/10/2014     Priority: Medium     Hypertension goal BP (blood pressure) < 140/90 08/12/2014     Priority: Medium     Depression with anxiety 08/12/2014     Priority: Medium     Tobacco abuse: [age]y/o on 8-12-14 @ 1ppd=50 pk yr hx  08/12/2014     Priority: Medium     Stasis dermatitis 08/12/2014     Priority: Medium     Mild cognitive impairment SLUMS = 22/ 30  CPT = 5.0/ 5.5 7-2014 07/28/2014     Priority: Medium     Health Care Home 07/15/2014     Priority: Medium     State Tier Level:  unknown  Status:  [facility]  Carrol Gibbs RN  [facility] Care Coordinator  [phone]                             Intractable back pain 07/11/2014     Priority: Medium     Neck pain 06/11/2014     Priority: Medium     Cardiac pacemaker in situ 05/22/2014     Priority: Medium     CHF (congestive heart failure) (H) 05/09/2014     Priority: Medium     COPD (chronic obstructive pulmonary disease) (H) 04/28/2014     Priority: Medium     Elevated TSH 04/28/2014     Priority: Medium     Diplopia 04/12/2014     Priority: Medium     Melanosis of colon 01/29/2013     Priority: Medium     Pulmonary hypertension      Priority: Medium     c diff colitis 11-12-12 11/13/2012     Priority: Medium     Adjustment reaction with anxiety and depression 11/01/2012     Priority: Medium     Pleural effusion, left 10/01/2012     Priority: Medium     Chronic low back pain 08/31/2012     Priority: Medium     Diabetes with proteinuria 11/09/2011     Priority: Medium     CKD (chronic kidney disease) stage 3, GFR 30-59 ml/min 08/10/2011     Priority: Medium     Anemia 06/07/2011     Priority: Medium     Type 2 diabetes mellitus with diabetic nephropathy (H) 12/07/2010     Priority: Medium     Permanent atrial fibrillation (H)  08/01/2008     Priority: Medium     S/P AV node ablation and pacemaker 10-25-12          Past Medical History:   Diagnosis Date     Anemia      Ankle arthritis      Arthritis      Back pain      Bell's palsy 9/08    left     Breast CA (H) 2004-    left lumpectomy, radiation, -recurrence     Colon polyps     colonoscopy-9/12-next due in 9/13     Congestive heart failure (H)      COPD (chronic obstructive pulmonary disease) (H)      Coronary artery disease      DM (diabetes mellitus) (H)      GERD (gastroesophageal reflux disease)      Hyperlipidemia      Hypertension      Lumbar spinal stenosis     use cane     Obesity      Osteoporosis      Other chronic pain      Pacemaker      Permanent atrial fibrillation (H) 8/2008    S/P AV node ablation and pacemaker 10-25-12       Pleural effusion      Pulmonary hypertension      Rectal stenosis      Tobacco abuse     current     Past Surgical History:   Procedure Laterality Date     ARTHROSCOPY SHOULDER RT/LT  1999, 2004    Bilateral, right then left     BONE MARROW BIOPSY, BONE SPECIMEN, NEEDLE/TROCAR N/A 8/29/2017    Procedure: BIOPSY BONE MARROW;  BONE MARROW BIOPSY;  Surgeon: Marino Cohen MD;  Location:  GI     C DEXA, BONE DENSITY, AXIAL SKEL       C MAMMOGRAM, SCREENING  1/2009     COLONOSCOPY N/A 10/7/2016    Procedure: COMBINED COLONOSCOPY, SINGLE OR MULTIPLE BIOPSY/POLYPECTOMY BY BIOPSY;  Surgeon: Cassandra Mccord MD;  Location:  GI     ESOPHAGOSCOPY, GASTROSCOPY, DUODENOSCOPY (EGD), COMBINED N/A 8/10/2017    Procedure: COMBINED ESOPHAGOSCOPY, GASTROSCOPY, DUODENOSCOPY (EGD), BIOPSY SINGLE OR MULTIPLE;  gastroscopy;  Surgeon: Helene Bustamante MD;  Location:  GI     H ABLATION AV NODE  10/2012     HC COLONOSCOPY THRU STOMA, DIAGNOSTIC  ? 2005     IMPLANT PACEMAKER  10/2012    [place] [id number], [id number]     JOINT REPLACEMTN, KNEE RT/LT      Joint Replacement knee bilateral     LAPAROSCOPIC CHOLECYSTECTOMY WITH CHOLANGIOGRAMS N/A 12/14/2015     Procedure: LAPAROSCOPIC CHOLECYSTECTOMY WITH CHOLANGIOGRAMS;  Surgeon: Ervin Amos MD;  Location:  OR     LUMPECTOMY BREAST      Left-2004     PHACOEMULSIFICATION CLEAR CORNEA WITH STANDARD INTRAOCULAR LENS IMPLANT Left 7/16/2015    Procedure: PHACOEMULSIFICATION CLEAR CORNEA WITH STANDARD INTRAOCULAR LENS IMPLANT;  Surgeon: Sai Obregon MD;  Location: [facility]     PHACOEMULSIFICATION CLEAR CORNEA WITH TORIC INTRAOCULAR LENS IMPLANT Right 5/9/2017    Procedure: PHACOEMULSIFICATION CLEAR CORNEA WITH TORIC INTRAOCULAR LENS IMPLANT;  RIGHT EYE PHACOEMULSIFICATION CLEAR CORNEA WITH TORIC INTRAOCULAR LENS IMPLANT ;  Surgeon: Duc Richmond MD;  Location: [facility]     Current Outpatient Prescriptions   Medication Sig Dispense Refill     clindamycin (CLEOCIN) 300 MG capsule Take 300 mg by mouth 3 times daily  1     simvastatin (ZOCOR) 10 MG tablet TAKE 1 TABLET (10 MG) BY MOUTH AT BEDTIME 90 tablet 0     nystatin (MYCOSTATIN) 457518 UNIT/GM POWD Apply topically 2 times daily 60 g 1     HYDROcodone-acetaminophen (NORCO) 5-325 MG per tablet Take 1 tablet by mouth every 6 hours as needed for moderate to severe pain 90 tablet 0     gabapentin (NEURONTIN) 300 MG capsule Take 2 capsules (600 mg) by mouth 3 times daily 180 capsule 3     lidocaine-prilocaine (EMLA) cream Apply topically as needed for moderate pain 30 g 1     lidocaine-prilocaine (EMLA) cream Apply topically as needed for moderate pain 30 g 1     ranitidine (ZANTAC) 150 MG tablet Take 1 tablet (150 mg) by mouth 2 times daily 60 tablet 3     blood glucose calibration (NO BRAND SPECIFIED) solution Use to calibrate blood glucose monitor as directed. Please dispense brand of solution that works with patient's current meter 1 each 11     ondansetron (ZOFRAN ODT) 4 MG ODT tab Take 1 tablet (4 mg) by mouth every 6 hours as needed for nausea 30 tablet 1     apixaban ANTICOAGULANT (ELIQUIS) 2.5 MG tablet Take 1 tablet (2.5 mg) by mouth 2 times daily 60  tablet 3     betamethasone valerate (VALISONE) 0.1 % cream APPLY TOPICALLY 2 TIMES DAILY USE SPARINGLY 15 g 1     glipiZIDE (GLUCOTROL) 5 MG tablet TAKE 1 TABLET BY MOUTH EVERY MORNING AND 0.5 TABLET EVERY EVENING 135 tablet 1     lisinopril (PRINIVIL/ZESTRIL) 10 MG tablet Take 1 tablet (10 mg) by mouth daily 90 tablet 1     furosemide (LASIX) 20 MG tablet TAKE 1-2 TABS BY MOUTH ONCE DAILY. MAY REPEAT AFTER NOON IF NEEDED FOR WEIGHT GAIN/2+ EDEMA 180 tablet 1     tiotropium (SPIRIVA HANDIHALER) 18 MCG capsule Inhale 1 capsule (18 mcg) into the lungs daily 30 capsule 3     traMADol (ULTRAM) 50 MG tablet Take 1 tablet (50 mg) by mouth every 6 hours as needed for moderate pain 100 tablet 1     hydrOXYzine (VISTARIL) 25 MG capsule Take 1 capsule (25 mg) by mouth 2 times daily as needed for itching 180 capsule 1     fluticasone (FLONASE) 50 MCG/ACT spray INHALE 1 TO 2 SPRAYS INTO BOTH NOSTRILS DAILY 16 mL 3     COMPOUND (CMPD RX) - PHARMACY TO MIX COMPOUNDED MEDICATION Vaginal valium- 10 mg suppository 42 suppository 3     hydrocortisone (ANUSOL-HC) 2.5 % rectal cream Place rectally 2 times daily as needed for hemorrhoids 28 g 1     ACCU-CHEK SMARTVIEW test strip 1 strip by In Vitro route 2 times daily Or as directed 100 each 1     blood glucose monitoring (ACCU-CHEK FASTCLIX) lancets USE TO TEST BLOOD SUGAR TWO TIMES A DAY OR AS DIRECTED 1 Box 11     Multiple Vitamins-Minerals (PRESERVISION/LUTEIN) CAPS Take 1 capsule by mouth daily        nystatin (MYCOSTATIN) 367297 UNIT/GM POWD Apply topically 2 times daily as needed 60 g 1     ORDER FOR DME, SET TO LOCAL PRINT, Equipment being ordered: AccuChek Smart View strips  Patient tests twice daily. 1 each 11     albuterol (PROAIR HFA, PROVENTIL HFA, VENTOLIN HFA) 108 (90 BASE) MCG/ACT inhaler Inhale 2 puffs into the lungs every 4 hours as needed for shortness of breath / dyspnea or wheezing 1 Inhaler 5     magnesium hydroxide (MILK OF MAGNESIA) 400 MG/5ML suspension Take 30  "mLs by mouth At Bedtime        OTC products: None, except as noted above    Allergies   Allergen Reactions     Penicillins Hives     Ambien [Zolpidem Tartrate]      Hallucinations and fell out of bed at night.     Definity      Caused a syncopal episode.     Sulfa Drugs Itching     Cymbalta Rash     Fluconazole Rash      Latex Allergy: NO    Social History   Substance Use Topics     Smoking status: Current Some Day Smoker     Packs/day: 0.25     Years: 45.00     Types: Cigarettes     Smokeless tobacco: Never Used      Comment: 4-5 cigs/day *12/22/17     Alcohol use No     History   Drug Use No       REVIEW OF SYSTEMS:                                                    Constitutional, HEENT, cardiovascular, pulmonary, gi and gu systems are negative, except as otherwise noted.      EXAM:                                                    /80 (BP Location: Left arm, Cuff Size: Adult Large)  Pulse 70  Temp 97  F (36.1  C) (Oral)  Ht 5' 6\" (1.676 m)  Wt 161 lb (73 kg)  LMP  (LMP Unknown)  SpO2 98%  BMI 25.99 kg/m2    GENERAL APPEARANCE: healthy, alert and no distress     EYES: EOMI, PERRL     HENT: ear canals and TM's normal and nose and mouth without ulcers or lesions     NECK: no adenopathy, no asymmetry, masses, or scars and thyroid normal to palpation     RESP: faint crackles left lower lobe      CV: regular rates and rhythm, normal S1 S2, no S3 or S4 and no murmur, click or rub     MS: extremities normal- no gross deformities noted, no evidence of inflammation in joints, FROM in all extremities.     SKIN: no suspicious lesions or rashes     NEURO: Normal strength and tone, sensory exam grossly normal, mentation intact and speech normal     PSYCH: mentation appears normal. and affect normal/bright     LYMPHATICS: No axillary, cervical, or supraclavicular nodes    DIAGNOSTICS:                                                    EKG: Pt has a pacer   Pt had a full lab panel completed on 12/2/17 that was " satisfactory.      Recent Labs   Lab Test  12/02/17   1125  10/11/17   1355   09/29/17   1619   08/10/17   0716  08/09/17   1720   04/25/17   1445   12/08/15   1705   HGB  12.8  12.5   < >  10.9*   < >  8.1*  6.8*   < >  11.8   < >  12.9   PLT  223  200   --   232   < >  191  234   < >  291   < >  202   INR   --    --    --    --    --    --   1.41*   --    --    --   1.21*   NA  138   --    --   137   < >  139  135   < >  135   < >  128*   POTASSIUM  4.0   --    --   4.5   < >  4.0  4.3   < >  5.0   < >  4.0   CR  1.48*   --    --   1.36*   < >  1.33*  1.37*   < >  1.20*   < >  0.98   A1C   --    --    --    --    --   6.7*   --    --   6.4*   < >   --     < > = values in this interval not displayed.        IMPRESSION:                                                    Reason for surgery/procedure: ischemic left leg   Diagnosis/reason for consult: medical preop     The proposed surgical procedure is considered INTERMEDIATE risk.    REVISED CARDIAC RISK INDEX  The patient has the following serious cardiovascular risks for perioperative complications such as (MI, PE, VFib and 3  AV Block):  No serious cardiac risks  INTERPRETATION: 0 risks: Class I (very low risk - 0.4% complication rate)    The patient has the following additional risks for perioperative complications:  No identified additional risks      ICD-10-CM    1. Preop general physical exam Z01.818        RECOMMENDATIONS:                                                        Pulmonary Risk  Recommend Incentive spirometry post op      --Patient is to take all scheduled medications on the day of surgery EXCEPT for modifications listed below.    Diabetes Medication Use  -----Hold usual  oral diabetic meds (e.g. Metformin, Actos, Glipizide) while NPO.       Anticoagulant or Antiplatelet Medication Use  Eliquis. Will hold for 48 hours before surgery        ACE Inhibitor or Angiotensin Receptor Blocker (ARB) Use  Ace inhibitor or Angiotensin Receptor Blocker  (ARB) and should HOLD this medication for the 24 hours prior to surgery.      She will not take any medications the morning of surgery       APPROVAL GIVEN to proceed with proposed procedure, without further diagnostic evaluation       Signed Electronically by: ALLEN Garcia CNP    Copy of this evaluation report is provided to requesting physician.    [facility] Preop Guidelines

## 2018-01-01 ENCOUNTER — TELEPHONE (OUTPATIENT)
Dept: FAMILY MEDICINE | Facility: CLINIC | Age: 83
End: 2018-01-01

## 2018-01-01 ENCOUNTER — APPOINTMENT (OUTPATIENT)
Dept: GENERAL RADIOLOGY | Facility: CLINIC | Age: 83
DRG: 469 | End: 2018-01-01
Attending: ORTHOPAEDIC SURGERY
Payer: MEDICARE

## 2018-01-01 ENCOUNTER — PATIENT OUTREACH (OUTPATIENT)
Dept: CARE COORDINATION | Facility: CLINIC | Age: 83
End: 2018-01-01

## 2018-01-01 ENCOUNTER — APPOINTMENT (OUTPATIENT)
Dept: GENERAL RADIOLOGY | Facility: CLINIC | Age: 83
DRG: 856 | End: 2018-01-01
Attending: INTERNAL MEDICINE
Payer: MEDICARE

## 2018-01-01 ENCOUNTER — APPOINTMENT (OUTPATIENT)
Dept: PHYSICAL THERAPY | Facility: CLINIC | Age: 83
DRG: 469 | End: 2018-01-01
Payer: MEDICARE

## 2018-01-01 ENCOUNTER — TELEPHONE (OUTPATIENT)
Dept: GERIATRICS | Facility: CLINIC | Age: 83
End: 2018-01-01

## 2018-01-01 ENCOUNTER — APPOINTMENT (OUTPATIENT)
Dept: OCCUPATIONAL THERAPY | Facility: CLINIC | Age: 83
DRG: 856 | End: 2018-01-01
Attending: INTERNAL MEDICINE
Payer: MEDICARE

## 2018-01-01 ENCOUNTER — MEDICAL CORRESPONDENCE (OUTPATIENT)
Dept: HEALTH INFORMATION MANAGEMENT | Facility: CLINIC | Age: 83
End: 2018-01-01

## 2018-01-01 ENCOUNTER — OFFICE VISIT (OUTPATIENT)
Dept: FAMILY MEDICINE | Facility: CLINIC | Age: 83
End: 2018-01-01
Payer: MEDICARE

## 2018-01-01 ENCOUNTER — APPOINTMENT (OUTPATIENT)
Dept: CT IMAGING | Facility: CLINIC | Age: 83
DRG: 469 | End: 2018-01-01
Attending: EMERGENCY MEDICINE
Payer: MEDICARE

## 2018-01-01 ENCOUNTER — APPOINTMENT (OUTPATIENT)
Dept: GENERAL RADIOLOGY | Facility: CLINIC | Age: 83
End: 2018-01-01
Attending: EMERGENCY MEDICINE
Payer: MEDICARE

## 2018-01-01 ENCOUNTER — ANESTHESIA EVENT (OUTPATIENT)
Dept: SURGERY | Facility: CLINIC | Age: 83
DRG: 856 | End: 2018-01-01
Payer: MEDICARE

## 2018-01-01 ENCOUNTER — ANESTHESIA (OUTPATIENT)
Dept: SURGERY | Facility: CLINIC | Age: 83
DRG: 856 | End: 2018-01-01
Payer: MEDICARE

## 2018-01-01 ENCOUNTER — APPOINTMENT (OUTPATIENT)
Dept: PHYSICAL THERAPY | Facility: CLINIC | Age: 83
DRG: 856 | End: 2018-01-01
Attending: INTERNAL MEDICINE
Payer: MEDICARE

## 2018-01-01 ENCOUNTER — HOSPITAL ENCOUNTER (OUTPATIENT)
Facility: CLINIC | Age: 83
End: 2018-01-01
Attending: ORTHOPAEDIC SURGERY | Admitting: ORTHOPAEDIC SURGERY
Payer: MEDICARE

## 2018-01-01 ENCOUNTER — HOSPITAL ENCOUNTER (INPATIENT)
Facility: CLINIC | Age: 83
LOS: 7 days | Discharge: NURSING, REHAB OR LONG-TERM CARE | DRG: 856 | End: 2018-12-26
Attending: INTERNAL MEDICINE | Admitting: INTERNAL MEDICINE
Payer: MEDICARE

## 2018-01-01 ENCOUNTER — OFFICE VISIT (OUTPATIENT)
Dept: ENDOCRINOLOGY | Facility: CLINIC | Age: 83
End: 2018-01-01
Payer: MEDICARE

## 2018-01-01 ENCOUNTER — TRANSFERRED RECORDS (OUTPATIENT)
Dept: HEALTH INFORMATION MANAGEMENT | Facility: CLINIC | Age: 83
End: 2018-01-01

## 2018-01-01 ENCOUNTER — HOSPITAL ENCOUNTER (INPATIENT)
Facility: CLINIC | Age: 83
LOS: 6 days | Discharge: NURSING, REHAB OR LONG-TERM CARE | DRG: 469 | End: 2018-10-26
Attending: EMERGENCY MEDICINE | Admitting: INTERNAL MEDICINE
Payer: MEDICARE

## 2018-01-01 ENCOUNTER — SURGERY (OUTPATIENT)
Age: 83
End: 2018-01-01

## 2018-01-01 ENCOUNTER — APPOINTMENT (OUTPATIENT)
Dept: PHYSICAL THERAPY | Facility: CLINIC | Age: 83
DRG: 856 | End: 2018-01-01
Attending: ORTHOPAEDIC SURGERY
Payer: MEDICARE

## 2018-01-01 ENCOUNTER — PATIENT OUTREACH (OUTPATIENT)
Dept: FAMILY MEDICINE | Facility: CLINIC | Age: 83
End: 2018-01-01

## 2018-01-01 ENCOUNTER — HOSPITAL ENCOUNTER (OUTPATIENT)
Facility: CLINIC | Age: 83
Setting detail: OBSERVATION
Discharge: HOME HEALTH | End: 2018-07-23
Attending: EMERGENCY MEDICINE | Admitting: INTERNAL MEDICINE
Payer: MEDICARE

## 2018-01-01 ENCOUNTER — APPOINTMENT (OUTPATIENT)
Dept: GENERAL RADIOLOGY | Facility: CLINIC | Age: 83
DRG: 469 | End: 2018-01-01
Attending: HOSPITALIST
Payer: MEDICARE

## 2018-01-01 ENCOUNTER — HOSPITAL ENCOUNTER (EMERGENCY)
Facility: CLINIC | Age: 83
Discharge: HOME | End: 2018-07-16
Attending: EMERGENCY MEDICINE | Admitting: EMERGENCY MEDICINE
Payer: MEDICARE

## 2018-01-01 ENCOUNTER — ANESTHESIA EVENT (OUTPATIENT)
Dept: SURGERY | Facility: CLINIC | Age: 83
DRG: 469 | End: 2018-01-01
Payer: MEDICARE

## 2018-01-01 ENCOUNTER — TELEPHONE (OUTPATIENT)
Dept: CARDIOLOGY | Facility: CLINIC | Age: 83
End: 2018-01-01

## 2018-01-01 ENCOUNTER — APPOINTMENT (OUTPATIENT)
Dept: GENERAL RADIOLOGY | Facility: CLINIC | Age: 83
DRG: 469 | End: 2018-01-01
Attending: EMERGENCY MEDICINE
Payer: MEDICARE

## 2018-01-01 ENCOUNTER — DISCHARGE SUMMARY NURSING HOME (OUTPATIENT)
Dept: GERIATRICS | Facility: CLINIC | Age: 83
End: 2018-01-01
Payer: MEDICARE

## 2018-01-01 ENCOUNTER — ALLIED HEALTH/NURSE VISIT (OUTPATIENT)
Dept: CARDIOLOGY | Facility: CLINIC | Age: 83
End: 2018-01-01
Payer: MEDICARE

## 2018-01-01 ENCOUNTER — HOSPITAL ENCOUNTER (OUTPATIENT)
Dept: ULTRASOUND IMAGING | Facility: CLINIC | Age: 83
Discharge: HOME | End: 2018-09-24
Attending: SURGERY | Admitting: SURGERY
Payer: MEDICARE

## 2018-01-01 ENCOUNTER — ANESTHESIA (OUTPATIENT)
Dept: SURGERY | Facility: CLINIC | Age: 83
DRG: 469 | End: 2018-01-01
Payer: MEDICARE

## 2018-01-01 ENCOUNTER — OFFICE VISIT (OUTPATIENT)
Dept: OTHER | Facility: CLINIC | Age: 83
End: 2018-01-01
Attending: SURGERY
Payer: MEDICARE

## 2018-01-01 ENCOUNTER — HOSPITAL ENCOUNTER (EMERGENCY)
Facility: CLINIC | Age: 83
Discharge: HOME | End: 2018-11-13
Attending: EMERGENCY MEDICINE | Admitting: EMERGENCY MEDICINE
Payer: MEDICARE

## 2018-01-01 ENCOUNTER — APPOINTMENT (OUTPATIENT)
Dept: OCCUPATIONAL THERAPY | Facility: CLINIC | Age: 83
DRG: 856 | End: 2018-01-01
Attending: ORTHOPAEDIC SURGERY
Payer: MEDICARE

## 2018-01-01 ENCOUNTER — OFFICE VISIT (OUTPATIENT)
Dept: CARDIOLOGY | Facility: CLINIC | Age: 83
End: 2018-01-01
Attending: NURSE PRACTITIONER
Payer: MEDICARE

## 2018-01-01 ENCOUNTER — APPOINTMENT (OUTPATIENT)
Dept: CT IMAGING | Facility: CLINIC | Age: 83
End: 2018-01-01
Attending: EMERGENCY MEDICINE
Payer: MEDICARE

## 2018-01-01 ENCOUNTER — HOSPITAL ENCOUNTER (EMERGENCY)
Facility: CLINIC | Age: 83
Discharge: HOME | End: 2018-07-18
Attending: EMERGENCY MEDICINE | Admitting: EMERGENCY MEDICINE
Payer: MEDICARE

## 2018-01-01 ENCOUNTER — APPOINTMENT (OUTPATIENT)
Dept: GENERAL RADIOLOGY | Facility: CLINIC | Age: 83
DRG: 856 | End: 2018-01-01
Attending: ORTHOPAEDIC SURGERY
Payer: MEDICARE

## 2018-01-01 ENCOUNTER — NURSING HOME VISIT (OUTPATIENT)
Dept: GERIATRICS | Facility: CLINIC | Age: 83
End: 2018-01-01
Payer: MEDICARE

## 2018-01-01 ENCOUNTER — APPOINTMENT (OUTPATIENT)
Dept: PHYSICAL THERAPY | Facility: CLINIC | Age: 83
DRG: 469 | End: 2018-01-01
Attending: INTERNAL MEDICINE
Payer: MEDICARE

## 2018-01-01 ENCOUNTER — TELEPHONE (OUTPATIENT)
Dept: PODIATRY | Facility: CLINIC | Age: 83
End: 2018-01-01

## 2018-01-01 VITALS
HEART RATE: 75 BPM | RESPIRATION RATE: 18 BRPM | DIASTOLIC BLOOD PRESSURE: 80 MMHG | TEMPERATURE: 97.9 F | WEIGHT: 195.9 LBS | OXYGEN SATURATION: 96 % | SYSTOLIC BLOOD PRESSURE: 130 MMHG | BODY MASS INDEX: 32.64 KG/M2 | HEIGHT: 65 IN

## 2018-01-01 VITALS
SYSTOLIC BLOOD PRESSURE: 124 MMHG | DIASTOLIC BLOOD PRESSURE: 66 MMHG | WEIGHT: 147.5 LBS | HEIGHT: 66 IN | HEART RATE: 66 BPM | BODY MASS INDEX: 23.7 KG/M2

## 2018-01-01 VITALS
DIASTOLIC BLOOD PRESSURE: 79 MMHG | SYSTOLIC BLOOD PRESSURE: 145 MMHG | RESPIRATION RATE: 12 BRPM | TEMPERATURE: 98 F | OXYGEN SATURATION: 91 % | HEART RATE: 70 BPM

## 2018-01-01 VITALS
WEIGHT: 182 LBS | DIASTOLIC BLOOD PRESSURE: 59 MMHG | HEART RATE: 69 BPM | HEIGHT: 65 IN | SYSTOLIC BLOOD PRESSURE: 102 MMHG | BODY MASS INDEX: 30.32 KG/M2

## 2018-01-01 VITALS
WEIGHT: 181 LBS | DIASTOLIC BLOOD PRESSURE: 56 MMHG | HEIGHT: 65 IN | SYSTOLIC BLOOD PRESSURE: 104 MMHG | HEART RATE: 79 BPM | TEMPERATURE: 97.1 F | BODY MASS INDEX: 30.16 KG/M2 | OXYGEN SATURATION: 96 %

## 2018-01-01 VITALS
TEMPERATURE: 98.2 F | OXYGEN SATURATION: 95 % | RESPIRATION RATE: 18 BRPM | DIASTOLIC BLOOD PRESSURE: 65 MMHG | WEIGHT: 184.5 LBS | SYSTOLIC BLOOD PRESSURE: 132 MMHG | BODY MASS INDEX: 30.7 KG/M2 | HEART RATE: 70 BPM

## 2018-01-01 VITALS
OXYGEN SATURATION: 97 % | BODY MASS INDEX: 27.28 KG/M2 | WEIGHT: 169 LBS | TEMPERATURE: 97.8 F | SYSTOLIC BLOOD PRESSURE: 160 MMHG | DIASTOLIC BLOOD PRESSURE: 78 MMHG | RESPIRATION RATE: 16 BRPM

## 2018-01-01 VITALS
WEIGHT: 184.6 LBS | DIASTOLIC BLOOD PRESSURE: 76 MMHG | HEART RATE: 71 BPM | TEMPERATURE: 97.7 F | BODY MASS INDEX: 30.72 KG/M2 | RESPIRATION RATE: 18 BRPM | OXYGEN SATURATION: 95 % | SYSTOLIC BLOOD PRESSURE: 140 MMHG

## 2018-01-01 VITALS
HEIGHT: 66 IN | TEMPERATURE: 97.6 F | DIASTOLIC BLOOD PRESSURE: 81 MMHG | SYSTOLIC BLOOD PRESSURE: 156 MMHG | OXYGEN SATURATION: 95 % | HEART RATE: 75 BPM | RESPIRATION RATE: 18 BRPM

## 2018-01-01 VITALS
WEIGHT: 209.7 LBS | DIASTOLIC BLOOD PRESSURE: 75 MMHG | BODY MASS INDEX: 34.94 KG/M2 | HEART RATE: 72 BPM | HEIGHT: 65 IN | SYSTOLIC BLOOD PRESSURE: 135 MMHG | OXYGEN SATURATION: 94 % | TEMPERATURE: 97.8 F | RESPIRATION RATE: 18 BRPM

## 2018-01-01 VITALS
OXYGEN SATURATION: 96 % | HEART RATE: 69 BPM | HEIGHT: 66 IN | BODY MASS INDEX: 25.55 KG/M2 | TEMPERATURE: 97 F | WEIGHT: 159 LBS | DIASTOLIC BLOOD PRESSURE: 90 MMHG | SYSTOLIC BLOOD PRESSURE: 157 MMHG

## 2018-01-01 VITALS
OXYGEN SATURATION: 95 % | BODY MASS INDEX: 30.82 KG/M2 | DIASTOLIC BLOOD PRESSURE: 65 MMHG | RESPIRATION RATE: 15 BRPM | TEMPERATURE: 98 F | SYSTOLIC BLOOD PRESSURE: 116 MMHG | HEIGHT: 65 IN | HEART RATE: 72 BPM | WEIGHT: 185 LBS

## 2018-01-01 VITALS
HEIGHT: 65 IN | TEMPERATURE: 97.6 F | HEART RATE: 74 BPM | OXYGEN SATURATION: 95 % | SYSTOLIC BLOOD PRESSURE: 93 MMHG | DIASTOLIC BLOOD PRESSURE: 51 MMHG | BODY MASS INDEX: 31.32 KG/M2 | WEIGHT: 188 LBS

## 2018-01-01 VITALS
DIASTOLIC BLOOD PRESSURE: 48 MMHG | TEMPERATURE: 96.4 F | RESPIRATION RATE: 18 BRPM | SYSTOLIC BLOOD PRESSURE: 119 MMHG | HEART RATE: 70 BPM | OXYGEN SATURATION: 95 %

## 2018-01-01 VITALS
BODY MASS INDEX: 23.74 KG/M2 | RESPIRATION RATE: 16 BRPM | WEIGHT: 147 LBS | TEMPERATURE: 97.4 F | SYSTOLIC BLOOD PRESSURE: 127 MMHG | HEART RATE: 70 BPM | OXYGEN SATURATION: 98 % | DIASTOLIC BLOOD PRESSURE: 77 MMHG

## 2018-01-01 VITALS — DIASTOLIC BLOOD PRESSURE: 72 MMHG | SYSTOLIC BLOOD PRESSURE: 116 MMHG | HEART RATE: 70 BPM

## 2018-01-01 DIAGNOSIS — I48.91 ATRIAL FIBRILLATION, UNSPECIFIED TYPE (H): ICD-10-CM

## 2018-01-01 DIAGNOSIS — J44.9 CHRONIC OBSTRUCTIVE PULMONARY DISEASE, UNSPECIFIED COPD TYPE (H): ICD-10-CM

## 2018-01-01 DIAGNOSIS — H34.212 HOLLENHORST PLAQUE, LEFT EYE: Primary | ICD-10-CM

## 2018-01-01 DIAGNOSIS — G89.29 OTHER CHRONIC PAIN: ICD-10-CM

## 2018-01-01 DIAGNOSIS — J18.9 COMMUNITY ACQUIRED PNEUMONIA: ICD-10-CM

## 2018-01-01 DIAGNOSIS — R82.71 BACTERIURIA: ICD-10-CM

## 2018-01-01 DIAGNOSIS — T81.49XA LEFT HIP POSTOPERATIVE WOUND INFECTION: ICD-10-CM

## 2018-01-01 DIAGNOSIS — T81.49XA SURGICAL WOUND INFECTION: ICD-10-CM

## 2018-01-01 DIAGNOSIS — G89.29 CHRONIC MIDLINE LOW BACK PAIN, WITH SCIATICA PRESENCE UNSPECIFIED: ICD-10-CM

## 2018-01-01 DIAGNOSIS — I89.0 LYMPHEDEMA OF BOTH LOWER EXTREMITIES: ICD-10-CM

## 2018-01-01 DIAGNOSIS — R60.0 EDEMA OF LOWER EXTREMITY: Primary | ICD-10-CM

## 2018-01-01 DIAGNOSIS — D64.9 ANEMIA, UNSPECIFIED TYPE: ICD-10-CM

## 2018-01-01 DIAGNOSIS — J96.21 ACUTE AND CHRONIC RESPIRATORY FAILURE WITH HYPOXIA (H): Primary | ICD-10-CM

## 2018-01-01 DIAGNOSIS — Z95.0 CARDIAC PACEMAKER IN SITU: Primary | ICD-10-CM

## 2018-01-01 DIAGNOSIS — I50.32 CHRONIC DIASTOLIC CONGESTIVE HEART FAILURE (H): ICD-10-CM

## 2018-01-01 DIAGNOSIS — E11.21 TYPE 2 DIABETES MELLITUS WITH DIABETIC NEPHROPATHY, WITHOUT LONG-TERM CURRENT USE OF INSULIN (H): Primary | ICD-10-CM

## 2018-01-01 DIAGNOSIS — E11.21 TYPE 2 DIABETES MELLITUS WITH DIABETIC NEPHROPATHY (H): ICD-10-CM

## 2018-01-01 DIAGNOSIS — E11.21 TYPE 2 DIABETES MELLITUS WITH DIABETIC NEPHROPATHY, WITHOUT LONG-TERM CURRENT USE OF INSULIN (H): ICD-10-CM

## 2018-01-01 DIAGNOSIS — N18.30 CKD (CHRONIC KIDNEY DISEASE) STAGE 3, GFR 30-59 ML/MIN (H): ICD-10-CM

## 2018-01-01 DIAGNOSIS — I50.30 (HFPEF) HEART FAILURE WITH PRESERVED EJECTION FRACTION (H): ICD-10-CM

## 2018-01-01 DIAGNOSIS — I10 HYPERTENSION GOAL BP (BLOOD PRESSURE) < 140/90: ICD-10-CM

## 2018-01-01 DIAGNOSIS — J15.9 COMMUNITY ACQUIRED BACTERIAL PNEUMONIA: Primary | ICD-10-CM

## 2018-01-01 DIAGNOSIS — N18.4 CKD (CHRONIC KIDNEY DISEASE) STAGE 4, GFR 15-29 ML/MIN (H): ICD-10-CM

## 2018-01-01 DIAGNOSIS — I70.229 CRITICAL ISCHEMIA OF LOWER EXTREMITY (H): ICD-10-CM

## 2018-01-01 DIAGNOSIS — B37.0 THRUSH: ICD-10-CM

## 2018-01-01 DIAGNOSIS — B37.31 VULVOVAGINAL CANDIDIASIS: ICD-10-CM

## 2018-01-01 DIAGNOSIS — I70.229 CRITICAL ISCHEMIA OF LOWER EXTREMITY (H): Primary | ICD-10-CM

## 2018-01-01 DIAGNOSIS — S72.042P: Primary | ICD-10-CM

## 2018-01-01 DIAGNOSIS — R74.01 TRANSAMINITIS: ICD-10-CM

## 2018-01-01 DIAGNOSIS — S09.90XA CLOSED HEAD INJURY, INITIAL ENCOUNTER: ICD-10-CM

## 2018-01-01 DIAGNOSIS — R53.81 PHYSICAL DECONDITIONING: ICD-10-CM

## 2018-01-01 DIAGNOSIS — R79.89 ELEVATED LFTS: ICD-10-CM

## 2018-01-01 DIAGNOSIS — J18.9 PNEUMONIA OF LEFT LOWER LOBE DUE TO INFECTIOUS ORGANISM: Primary | ICD-10-CM

## 2018-01-01 DIAGNOSIS — K04.7 DENTAL INFECTION: ICD-10-CM

## 2018-01-01 DIAGNOSIS — M54.9 INTRACTABLE BACK PAIN: ICD-10-CM

## 2018-01-01 DIAGNOSIS — G89.4 CHRONIC PAIN SYNDROME: ICD-10-CM

## 2018-01-01 DIAGNOSIS — J18.9 PNEUMONIA DUE TO INFECTIOUS ORGANISM, UNSPECIFIED LATERALITY, UNSPECIFIED PART OF LUNG: Primary | ICD-10-CM

## 2018-01-01 DIAGNOSIS — E87.1 HYPONATREMIA: ICD-10-CM

## 2018-01-01 DIAGNOSIS — Z98.890 S/P AV NODAL ABLATION: ICD-10-CM

## 2018-01-01 DIAGNOSIS — D62 ANEMIA DUE TO BLOOD LOSS, ACUTE: ICD-10-CM

## 2018-01-01 DIAGNOSIS — I48.21 PERMANENT ATRIAL FIBRILLATION (H): ICD-10-CM

## 2018-01-01 DIAGNOSIS — K52.9 GASTROENTERITIS: ICD-10-CM

## 2018-01-01 DIAGNOSIS — I42.9 CARDIOMYOPATHY, UNSPECIFIED TYPE (H): ICD-10-CM

## 2018-01-01 DIAGNOSIS — R53.1 GENERALIZED WEAKNESS: ICD-10-CM

## 2018-01-01 DIAGNOSIS — T14.8XXA LOCAL INFECTION OF WOUND: ICD-10-CM

## 2018-01-01 DIAGNOSIS — L08.9 LOCAL INFECTION OF WOUND: ICD-10-CM

## 2018-01-01 DIAGNOSIS — N28.9 RENAL INSUFFICIENCY: ICD-10-CM

## 2018-01-01 DIAGNOSIS — Z87.81 S/P LEFT HIP FRACTURE: Primary | ICD-10-CM

## 2018-01-01 DIAGNOSIS — I73.9 PAD (PERIPHERAL ARTERY DISEASE) (H): ICD-10-CM

## 2018-01-01 DIAGNOSIS — S72.002A CLOSED FRACTURE OF LEFT HIP, INITIAL ENCOUNTER (H): ICD-10-CM

## 2018-01-01 DIAGNOSIS — Z96.642 HISTORY OF TOTAL LEFT HIP REPLACEMENT: ICD-10-CM

## 2018-01-01 DIAGNOSIS — I21.4 NSTEMI (NON-ST ELEVATED MYOCARDIAL INFARCTION) (H): ICD-10-CM

## 2018-01-01 DIAGNOSIS — S72.002D: Primary | ICD-10-CM

## 2018-01-01 DIAGNOSIS — M80.00XA AGE-RELATED OSTEOPOROSIS WITH CURRENT PATHOLOGICAL FRACTURE, INITIAL ENCOUNTER: Primary | ICD-10-CM

## 2018-01-01 DIAGNOSIS — G62.9 NEUROPATHY: ICD-10-CM

## 2018-01-01 DIAGNOSIS — M54.5 CHRONIC MIDLINE LOW BACK PAIN, WITH SCIATICA PRESENCE UNSPECIFIED: ICD-10-CM

## 2018-01-01 DIAGNOSIS — D50.9 IRON DEFICIENCY ANEMIA, UNSPECIFIED IRON DEFICIENCY ANEMIA TYPE: ICD-10-CM

## 2018-01-01 DIAGNOSIS — J18.8 OTHER PNEUMONIA, UNSPECIFIED ORGANISM: ICD-10-CM

## 2018-01-01 DIAGNOSIS — R60.0 EDEMA OF LOWER EXTREMITY: ICD-10-CM

## 2018-01-01 DIAGNOSIS — K59.00 CONSTIPATION, UNSPECIFIED CONSTIPATION TYPE: ICD-10-CM

## 2018-01-01 DIAGNOSIS — T81.89XA DRAINING POSTOPERATIVE WOUND, INITIAL ENCOUNTER: ICD-10-CM

## 2018-01-01 DIAGNOSIS — I50.33 ACUTE ON CHRONIC DIASTOLIC CHF (CONGESTIVE HEART FAILURE) (H): ICD-10-CM

## 2018-01-01 DIAGNOSIS — E78.5 HYPERLIPIDEMIA, UNSPECIFIED HYPERLIPIDEMIA TYPE: ICD-10-CM

## 2018-01-01 DIAGNOSIS — R60.9 SWELLING: ICD-10-CM

## 2018-01-01 DIAGNOSIS — M81.0 AGE-RELATED OSTEOPOROSIS WITHOUT CURRENT PATHOLOGICAL FRACTURE: Primary | ICD-10-CM

## 2018-01-01 DIAGNOSIS — R19.7 NAUSEA, VOMITING, AND DIARRHEA: ICD-10-CM

## 2018-01-01 DIAGNOSIS — Z01.818 PREOP GENERAL PHYSICAL EXAM: Primary | ICD-10-CM

## 2018-01-01 DIAGNOSIS — E11.9 TYPE 2 DIABETES MELLITUS (H): Primary | ICD-10-CM

## 2018-01-01 DIAGNOSIS — D50.0 IRON DEFICIENCY ANEMIA DUE TO CHRONIC BLOOD LOSS: ICD-10-CM

## 2018-01-01 DIAGNOSIS — R11.2 NAUSEA, VOMITING, AND DIARRHEA: ICD-10-CM

## 2018-01-01 DIAGNOSIS — I10 ESSENTIAL HYPERTENSION: ICD-10-CM

## 2018-01-01 DIAGNOSIS — M25.552 HIP PAIN, LEFT: ICD-10-CM

## 2018-01-01 DIAGNOSIS — B37.31 CANDIDAL VULVOVAGINITIS: ICD-10-CM

## 2018-01-01 DIAGNOSIS — R11.0 NAUSEA: ICD-10-CM

## 2018-01-01 DIAGNOSIS — Z86.39 HISTORY OF HYPERPARATHYROIDISM: ICD-10-CM

## 2018-01-01 LAB
ABO + RH BLD: NORMAL
ALBUMIN SERPL ELPH-MCNC: 3.1 G/DL (ref 3.7–5.1)
ALBUMIN SERPL-MCNC: 2.3 G/DL (ref 3.4–5)
ALBUMIN SERPL-MCNC: 2.4 G/DL (ref 3.4–5)
ALBUMIN SERPL-MCNC: 2.4 G/DL (ref 3.4–5)
ALBUMIN SERPL-MCNC: 2.5 G/DL (ref 3.4–5)
ALBUMIN SERPL-MCNC: 2.7 G/DL (ref 3.4–5)
ALBUMIN SERPL-MCNC: 2.7 G/DL (ref 3.4–5)
ALBUMIN SERPL-MCNC: 2.8 G/DL (ref 3.4–5)
ALBUMIN SERPL-MCNC: 2.8 G/DL (ref 3.4–5)
ALBUMIN SERPL-MCNC: 3.1 G/DL (ref 3.4–5)
ALBUMIN SERPL-MCNC: 3.1 G/DL (ref 3.4–5)
ALBUMIN SERPL-MCNC: 3.4 G/DL (ref 3.4–5)
ALBUMIN SERPL-MCNC: 3.5 G/DL (ref 3.4–5)
ALBUMIN SERPL-MCNC: 3.5 G/DL (ref 3.4–5)
ALBUMIN UR-MCNC: 10 MG/DL
ALBUMIN UR-MCNC: 100 MG/DL
ALBUMIN UR-MCNC: 100 MG/DL
ALBUMIN UR-MCNC: 30 MG/DL
ALBUMIN UR-MCNC: 30 MG/DL
ALP SERPL-CCNC: 105 U/L (ref 40–150)
ALP SERPL-CCNC: 74 U/L (ref 40–150)
ALP SERPL-CCNC: 75 U/L (ref 40–150)
ALP SERPL-CCNC: 78 U/L (ref 40–150)
ALP SERPL-CCNC: 78 U/L (ref 40–150)
ALP SERPL-CCNC: 79 U/L (ref 40–150)
ALP SERPL-CCNC: 79 U/L (ref 40–150)
ALP SERPL-CCNC: 82 U/L (ref 40–150)
ALP SERPL-CCNC: 84 U/L (ref 40–150)
ALP SERPL-CCNC: 94 U/L (ref 40–150)
ALP SERPL-CCNC: 98 U/L (ref 40–150)
ALPHA1 GLOB SERPL ELPH-MCNC: 0.5 G/DL (ref 0.2–0.4)
ALPHA2 GLOB SERPL ELPH-MCNC: 0.8 G/DL (ref 0.5–0.9)
ALT SERPL W P-5'-P-CCNC: 10 U/L (ref 0–50)
ALT SERPL W P-5'-P-CCNC: 140 U/L (ref 0–50)
ALT SERPL W P-5'-P-CCNC: 16 U/L (ref 0–50)
ALT SERPL W P-5'-P-CCNC: 17 U/L (ref 0–50)
ALT SERPL W P-5'-P-CCNC: 18 U/L (ref 0–50)
ALT SERPL W P-5'-P-CCNC: 223 U/L (ref 0–50)
ALT SERPL W P-5'-P-CCNC: 229 U/L (ref 0–50)
ALT SERPL W P-5'-P-CCNC: 255 U/L (ref 0–50)
ALT SERPL W P-5'-P-CCNC: 9 U/L (ref 0–50)
ALT SERPL-CCNC: 21 U/L (ref 9–55)
ALT SERPL-CCNC: 33 U/L (ref 9–55)
ANION GAP SERPL CALCULATED.3IONS-SCNC: 10 MMOL/L (ref 3–14)
ANION GAP SERPL CALCULATED.3IONS-SCNC: 4 MMOL/L (ref 3–14)
ANION GAP SERPL CALCULATED.3IONS-SCNC: 4 MMOL/L (ref 3–14)
ANION GAP SERPL CALCULATED.3IONS-SCNC: 5 MMOL/L (ref 3–14)
ANION GAP SERPL CALCULATED.3IONS-SCNC: 6 MMOL/L (ref 3–14)
ANION GAP SERPL CALCULATED.3IONS-SCNC: 7 MMOL/L (ref 3–14)
ANION GAP SERPL CALCULATED.3IONS-SCNC: 8 MMOL/L (ref 3–14)
ANION GAP SERPL CALCULATED.3IONS-SCNC: 9 MMOL/L (ref 3–14)
APPEARANCE UR: ABNORMAL
APPEARANCE UR: ABNORMAL
APPEARANCE UR: CLEAR
APTT PPP: 31 SEC (ref 22–37)
AST SERPL W P-5'-P-CCNC: 110 U/L (ref 0–45)
AST SERPL W P-5'-P-CCNC: 13 U/L (ref 0–45)
AST SERPL W P-5'-P-CCNC: 14 U/L (ref 0–45)
AST SERPL W P-5'-P-CCNC: 15 U/L (ref 0–45)
AST SERPL W P-5'-P-CCNC: 166 U/L (ref 0–45)
AST SERPL W P-5'-P-CCNC: 300 U/L (ref 0–45)
AST SERPL W P-5'-P-CCNC: 47 U/L (ref 0–45)
AST SERPL W P-5'-P-CCNC: 6 U/L (ref 0–45)
AST SERPL W P-5'-P-CCNC: 6 U/L (ref 0–45)
AST SERPL-CCNC: 17 U/L (ref 10–40)
AST SERPL-CCNC: 20 U/L (ref 10–40)
B-GLOBULIN SERPL ELPH-MCNC: 0.9 G/DL (ref 0.6–1)
BACTERIA #/AREA URNS HPF: ABNORMAL /HPF
BACTERIA #/AREA URNS HPF: ABNORMAL /HPF
BACTERIA SPEC CULT: ABNORMAL
BACTERIA SPEC CULT: NO GROWTH
BACTERIA SPEC CULT: NO GROWTH
BACTERIA SPEC CULT: NORMAL
BASOPHILS # BLD AUTO: 0 10E9/L (ref 0–0.2)
BASOPHILS # BLD AUTO: 0.1 10E9/L (ref 0–0.2)
BASOPHILS NFR BLD AUTO: 0.1 %
BASOPHILS NFR BLD AUTO: 0.2 %
BASOPHILS NFR BLD AUTO: 0.3 %
BASOPHILS NFR BLD AUTO: 0.6 %
BASOPHILS NFR BLD AUTO: 0.7 %
BILIRUB DIRECT SERPL-MCNC: 0.4 MG/DL (ref 0–0.2)
BILIRUB SERPL-MCNC: 0.5 MG/DL (ref 0.2–1.2)
BILIRUB SERPL-MCNC: 0.6 MG/DL (ref 0.2–1.2)
BILIRUB SERPL-MCNC: 0.6 MG/DL (ref 0.2–1.3)
BILIRUB SERPL-MCNC: 0.7 MG/DL (ref 0.2–1.3)
BILIRUB SERPL-MCNC: 0.7 MG/DL (ref 0.2–1.3)
BILIRUB SERPL-MCNC: 0.8 MG/DL (ref 0.2–1.3)
BILIRUB SERPL-MCNC: 1 MG/DL (ref 0.2–1.3)
BILIRUB SERPL-MCNC: 1.1 MG/DL (ref 0.2–1.3)
BILIRUB SERPL-MCNC: 1.1 MG/DL (ref 0.2–1.3)
BILIRUB UR QL STRIP: NEGATIVE
BLD GP AB SCN SERPL QL: NORMAL
BLD GP AB SCN SERPL QL: NORMAL
BLD PROD TYP BPU: NORMAL
BLD UNIT ID BPU: 0
BLOOD BANK CMNT PATIENT-IMP: NORMAL
BLOOD BANK CMNT PATIENT-IMP: NORMAL
BLOOD PRODUCT CODE: NORMAL
BPU ID: NORMAL
BUN SERPL-MCNC: 13 MG/DL (ref 7–30)
BUN SERPL-MCNC: 14 MG/DL (ref 7–30)
BUN SERPL-MCNC: 16 MG/DL (ref 9–26)
BUN SERPL-MCNC: 17 MG/DL (ref 7–30)
BUN SERPL-MCNC: 17 MG/DL (ref 9–26)
BUN SERPL-MCNC: 18 MG/DL (ref 7–30)
BUN SERPL-MCNC: 27 MG/DL (ref 7–30)
BUN SERPL-MCNC: 30 MG/DL (ref 7–30)
BUN SERPL-MCNC: 31 MG/DL (ref 7–30)
BUN SERPL-MCNC: 32 MG/DL (ref 7–30)
BUN SERPL-MCNC: 33 MG/DL (ref 7–30)
BUN SERPL-MCNC: 35 MG/DL (ref 7–30)
BUN SERPL-MCNC: 42 MG/DL (ref 7–30)
BUN SERPL-MCNC: 43 MG/DL (ref 7–30)
BUN SERPL-MCNC: 47 MG/DL (ref 7–30)
BUN SERPL-MCNC: 52 MG/DL (ref 7–30)
BUN SERPL-MCNC: 52 MG/DL (ref 7–30)
BUN SERPL-MCNC: 53 MG/DL (ref 7–30)
BUN SERPL-MCNC: 57 MG/DL (ref 7–30)
CALCIUM SERPL-MCNC: 10 MG/DL (ref 8.5–10.1)
CALCIUM SERPL-MCNC: 10 MG/DL (ref 8.5–10.1)
CALCIUM SERPL-MCNC: 10.1 MG/DL (ref 8.4–10.4)
CALCIUM SERPL-MCNC: 10.1 MG/DL (ref 8.5–10.1)
CALCIUM SERPL-MCNC: 10.3 MG/DL (ref 8.5–10.1)
CALCIUM SERPL-MCNC: 7.9 MG/DL (ref 8.5–10.1)
CALCIUM SERPL-MCNC: 8.1 MG/DL (ref 8.5–10.1)
CALCIUM SERPL-MCNC: 8.2 MG/DL (ref 8.5–10.1)
CALCIUM SERPL-MCNC: 8.3 MG/DL (ref 8.5–10.1)
CALCIUM SERPL-MCNC: 8.3 MG/DL (ref 8.5–10.1)
CALCIUM SERPL-MCNC: 8.4 MG/DL (ref 8.5–10.1)
CALCIUM SERPL-MCNC: 8.5 MG/DL (ref 8.5–10.1)
CALCIUM SERPL-MCNC: 8.6 MG/DL (ref 8.5–10.1)
CALCIUM SERPL-MCNC: 8.7 MG/DL (ref 8.5–10.1)
CALCIUM SERPL-MCNC: 9 MG/DL (ref 8.5–10.1)
CALCIUM SERPL-MCNC: 9.1 MG/DL (ref 8.5–10.1)
CALCIUM SERPL-MCNC: 9.2 MG/DL (ref 8.5–10.1)
CALCIUM SERPL-MCNC: 9.2 MG/DL (ref 8.5–10.1)
CALCIUM SERPL-MCNC: 9.6 MG/DL (ref 8.5–10.1)
CALCIUM SERPL-MCNC: 9.7 MG/DL (ref 8.5–10.1)
CALCIUM SERPL-MCNC: 9.9 MG/DL (ref 8.4–10.4)
CHLORIDE SERPL-SCNC: 100 MMOL/L (ref 94–109)
CHLORIDE SERPL-SCNC: 101 MMOL/L (ref 94–109)
CHLORIDE SERPL-SCNC: 102 MMOL/L (ref 94–109)
CHLORIDE SERPL-SCNC: 103 MMOL/L (ref 94–109)
CHLORIDE SERPL-SCNC: 104 MMOL/L (ref 94–109)
CHLORIDE SERPL-SCNC: 104 MMOL/L (ref 94–109)
CHLORIDE SERPL-SCNC: 105 MMOL/L (ref 94–109)
CHLORIDE SERPL-SCNC: 106 MMOL/L (ref 94–109)
CHLORIDE SERPL-SCNC: 107 MMOL/L (ref 94–109)
CHLORIDE SERPL-SCNC: 111 MMOL/L (ref 94–109)
CHLORIDE SERPL-SCNC: 93 MMOL/L (ref 94–109)
CHLORIDE SERPL-SCNC: 94 MMOL/L (ref 94–109)
CHLORIDE SERPL-SCNC: 96 MMOL/L (ref 94–109)
CHLORIDE SERPL-SCNC: 96 MMOL/L (ref 94–109)
CHLORIDE SERPL-SCNC: 99 MMOL/L (ref 94–109)
CHLORIDE SERPLBLD-SCNC: 101 MMOL/L (ref 98–109)
CHLORIDE SERPLBLD-SCNC: 103 MMOL/L (ref 98–109)
CO2 SERPL-SCNC: 24 MMOL/L (ref 20–32)
CO2 SERPL-SCNC: 25 MMOL/L (ref 20–32)
CO2 SERPL-SCNC: 26 MMOL/L (ref 20–32)
CO2 SERPL-SCNC: 26 MMOL/L (ref 22–31)
CO2 SERPL-SCNC: 27 MMOL/L (ref 20–32)
CO2 SERPL-SCNC: 28 MMOL/L (ref 20–32)
CO2 SERPL-SCNC: 28 MMOL/L (ref 20–32)
CO2 SERPL-SCNC: 28 MMOL/L (ref 22–31)
CO2 SERPL-SCNC: 29 MMOL/L (ref 20–32)
CO2 SERPL-SCNC: 29 MMOL/L (ref 20–32)
CO2 SERPL-SCNC: 30 MMOL/L (ref 20–32)
CO2 SERPL-SCNC: 31 MMOL/L (ref 20–32)
CO2 SERPL-SCNC: 33 MMOL/L (ref 20–32)
COLOR UR AUTO: ABNORMAL
COLOR UR AUTO: YELLOW
CREAT SERPL-MCNC: 1 MG/DL (ref 0.55–1.02)
CREAT SERPL-MCNC: 1.04 MG/DL (ref 0.52–1.04)
CREAT SERPL-MCNC: 1.11 MG/DL (ref 0.52–1.04)
CREAT SERPL-MCNC: 1.12 MG/DL (ref 0.55–1.02)
CREAT SERPL-MCNC: 1.14 MG/DL (ref 0.52–1.04)
CREAT SERPL-MCNC: 1.16 MG/DL (ref 0.52–1.04)
CREAT SERPL-MCNC: 1.19 MG/DL (ref 0.52–1.04)
CREAT SERPL-MCNC: 1.41 MG/DL (ref 0.52–1.04)
CREAT SERPL-MCNC: 1.58 MG/DL (ref 0.52–1.04)
CREAT SERPL-MCNC: 1.69 MG/DL (ref 0.52–1.04)
CREAT SERPL-MCNC: 1.7 MG/DL (ref 0.52–1.04)
CREAT SERPL-MCNC: 1.76 MG/DL (ref 0.52–1.04)
CREAT SERPL-MCNC: 1.8 MG/DL (ref 0.52–1.04)
CREAT SERPL-MCNC: 1.87 MG/DL (ref 0.52–1.04)
CREAT SERPL-MCNC: 1.87 MG/DL (ref 0.52–1.04)
CREAT SERPL-MCNC: 1.89 MG/DL (ref 0.52–1.04)
CREAT SERPL-MCNC: 1.97 MG/DL (ref 0.52–1.04)
CREAT SERPL-MCNC: 2.11 MG/DL (ref 0.52–1.04)
CREAT SERPL-MCNC: 2.21 MG/DL (ref 0.52–1.04)
CREAT SERPL-MCNC: 2.39 MG/DL (ref 0.52–1.04)
CREAT SERPL-MCNC: 2.94 MG/DL (ref 0.52–1.04)
CREAT SERPL-MCNC: 3.03 MG/DL (ref 0.52–1.04)
CREAT SERPL-MCNC: 3.04 MG/DL (ref 0.52–1.04)
CREAT SERPL-MCNC: 3.24 MG/DL (ref 0.52–1.04)
CREAT UR-MCNC: 151 MG/DL
CREAT UR-MCNC: 152 MG/DL
CRP SERPL-MCNC: 30.8 MG/L (ref 0–8)
DEPRECATED CALCIDIOL+CALCIFEROL SERPL-MC: 42 UG/L (ref 20–75)
DIFFERENTIAL METHOD BLD: ABNORMAL
EOSINOPHIL # BLD AUTO: 0.1 10E9/L (ref 0–0.7)
EOSINOPHIL # BLD AUTO: 0.2 10E9/L (ref 0–0.7)
EOSINOPHIL # BLD AUTO: 0.3 10E9/L (ref 0–0.7)
EOSINOPHIL NFR BLD AUTO: 0.8 %
EOSINOPHIL NFR BLD AUTO: 1.1 %
EOSINOPHIL NFR BLD AUTO: 2.8 %
EOSINOPHIL NFR BLD AUTO: 3.3 %
EOSINOPHIL NFR BLD AUTO: 3.8 %
EOSINOPHIL NFR BLD AUTO: 3.9 %
EOSINOPHIL NFR BLD AUTO: 4.5 %
ERYTHROCYTE [DISTWIDTH] IN BLOOD BY AUTOMATED COUNT: 15.2 % (ref 10–15)
ERYTHROCYTE [DISTWIDTH] IN BLOOD BY AUTOMATED COUNT: 15.4 % (ref 10–15)
ERYTHROCYTE [DISTWIDTH] IN BLOOD BY AUTOMATED COUNT: 15.6 % (ref 10–15)
ERYTHROCYTE [DISTWIDTH] IN BLOOD BY AUTOMATED COUNT: 15.8 % (ref 10–15)
ERYTHROCYTE [DISTWIDTH] IN BLOOD BY AUTOMATED COUNT: 16.1 % (ref 10–15)
ERYTHROCYTE [DISTWIDTH] IN BLOOD BY AUTOMATED COUNT: 17.1 % (ref 10–15)
ERYTHROCYTE [DISTWIDTH] IN BLOOD BY AUTOMATED COUNT: 17.2 % (ref 10–15)
ERYTHROCYTE [DISTWIDTH] IN BLOOD BY AUTOMATED COUNT: 17.5 % (ref 10–15)
ERYTHROCYTE [DISTWIDTH] IN BLOOD BY AUTOMATED COUNT: 17.6 % (ref 10–15)
ERYTHROCYTE [DISTWIDTH] IN BLOOD BY AUTOMATED COUNT: 17.7 % (ref 10–15)
ERYTHROCYTE [DISTWIDTH] IN BLOOD BY AUTOMATED COUNT: 18 % (ref 10–15)
ERYTHROCYTE [DISTWIDTH] IN BLOOD BY AUTOMATED COUNT: 19.3 % (ref 10–15)
ERYTHROCYTE [SEDIMENTATION RATE] IN BLOOD BY WESTERGREN METHOD: 48 MM/H (ref 0–30)
FERRITIN SERPL-MCNC: 190 NG/ML (ref 8–252)
FRACT EXCRET NA UR+SERPL-RTO: 0.2 %
GAMMA GLOB SERPL ELPH-MCNC: 1.3 G/DL (ref 0.7–1.6)
GFR SERPL CREATININE-BSD FRML MDRD: 14 ML/MIN/1.7M2
GFR SERPL CREATININE-BSD FRML MDRD: 15 ML/MIN/1.7M2
GFR SERPL CREATININE-BSD FRML MDRD: 19 ML/MIN/1.7M2
GFR SERPL CREATININE-BSD FRML MDRD: 21 ML/MIN/1.7M2
GFR SERPL CREATININE-BSD FRML MDRD: 22 ML/MIN/1.7M2
GFR SERPL CREATININE-BSD FRML MDRD: 23 ML/MIN/{1.73_M2}
GFR SERPL CREATININE-BSD FRML MDRD: 24 ML/MIN/{1.73_M2}
GFR SERPL CREATININE-BSD FRML MDRD: 25 ML/MIN/{1.73_M2}
GFR SERPL CREATININE-BSD FRML MDRD: 26 ML/MIN/1.7M2
GFR SERPL CREATININE-BSD FRML MDRD: 27 ML/MIN/1.7M2
GFR SERPL CREATININE-BSD FRML MDRD: 27 ML/MIN/{1.73_M2}
GFR SERPL CREATININE-BSD FRML MDRD: 28 ML/MIN/{1.73_M2}
GFR SERPL CREATININE-BSD FRML MDRD: 29 ML/MIN/1.7M2
GFR SERPL CREATININE-BSD FRML MDRD: 30 ML/MIN/{1.73_M2}
GFR SERPL CREATININE-BSD FRML MDRD: 35 ML/MIN/{1.73_M2}
GFR SERPL CREATININE-BSD FRML MDRD: 44 ML/MIN/1.7M2
GFR SERPL CREATININE-BSD FRML MDRD: 45 ML/MIN/1.7M2
GFR SERPL CREATININE-BSD FRML MDRD: 46 ML/MIN/1.73M2
GFR SERPL CREATININE-BSD FRML MDRD: 46 ML/MIN/1.7M2
GFR SERPL CREATININE-BSD FRML MDRD: 47 ML/MIN/1.7M2
GFR SERPL CREATININE-BSD FRML MDRD: 51 ML/MIN/1.7M2
GFR SERPL CREATININE-BSD FRML MDRD: 53 ML/MIN/1.73M2
GLUCOSE BLDC GLUCOMTR-MCNC: 105 MG/DL (ref 70–99)
GLUCOSE BLDC GLUCOMTR-MCNC: 105 MG/DL (ref 70–99)
GLUCOSE BLDC GLUCOMTR-MCNC: 108 MG/DL (ref 70–99)
GLUCOSE BLDC GLUCOMTR-MCNC: 120 MG/DL (ref 70–99)
GLUCOSE BLDC GLUCOMTR-MCNC: 124 MG/DL (ref 70–99)
GLUCOSE BLDC GLUCOMTR-MCNC: 126 MG/DL (ref 70–99)
GLUCOSE BLDC GLUCOMTR-MCNC: 128 MG/DL (ref 70–99)
GLUCOSE BLDC GLUCOMTR-MCNC: 131 MG/DL (ref 70–99)
GLUCOSE BLDC GLUCOMTR-MCNC: 131 MG/DL (ref 70–99)
GLUCOSE BLDC GLUCOMTR-MCNC: 132 MG/DL (ref 70–99)
GLUCOSE BLDC GLUCOMTR-MCNC: 132 MG/DL (ref 70–99)
GLUCOSE BLDC GLUCOMTR-MCNC: 134 MG/DL (ref 70–99)
GLUCOSE BLDC GLUCOMTR-MCNC: 134 MG/DL (ref 70–99)
GLUCOSE BLDC GLUCOMTR-MCNC: 137 MG/DL (ref 70–99)
GLUCOSE BLDC GLUCOMTR-MCNC: 138 MG/DL (ref 70–99)
GLUCOSE BLDC GLUCOMTR-MCNC: 138 MG/DL (ref 70–99)
GLUCOSE BLDC GLUCOMTR-MCNC: 141 MG/DL (ref 70–99)
GLUCOSE BLDC GLUCOMTR-MCNC: 142 MG/DL (ref 70–99)
GLUCOSE BLDC GLUCOMTR-MCNC: 146 MG/DL (ref 70–99)
GLUCOSE BLDC GLUCOMTR-MCNC: 148 MG/DL (ref 70–99)
GLUCOSE BLDC GLUCOMTR-MCNC: 150 MG/DL (ref 70–99)
GLUCOSE BLDC GLUCOMTR-MCNC: 150 MG/DL (ref 70–99)
GLUCOSE BLDC GLUCOMTR-MCNC: 152 MG/DL (ref 70–99)
GLUCOSE BLDC GLUCOMTR-MCNC: 154 MG/DL (ref 70–99)
GLUCOSE BLDC GLUCOMTR-MCNC: 154 MG/DL (ref 70–99)
GLUCOSE BLDC GLUCOMTR-MCNC: 156 MG/DL (ref 70–99)
GLUCOSE BLDC GLUCOMTR-MCNC: 157 MG/DL (ref 70–99)
GLUCOSE BLDC GLUCOMTR-MCNC: 158 MG/DL (ref 70–99)
GLUCOSE BLDC GLUCOMTR-MCNC: 158 MG/DL (ref 70–99)
GLUCOSE BLDC GLUCOMTR-MCNC: 159 MG/DL (ref 70–99)
GLUCOSE BLDC GLUCOMTR-MCNC: 160 MG/DL (ref 70–99)
GLUCOSE BLDC GLUCOMTR-MCNC: 160 MG/DL (ref 70–99)
GLUCOSE BLDC GLUCOMTR-MCNC: 161 MG/DL (ref 70–99)
GLUCOSE BLDC GLUCOMTR-MCNC: 161 MG/DL (ref 70–99)
GLUCOSE BLDC GLUCOMTR-MCNC: 162 MG/DL (ref 70–99)
GLUCOSE BLDC GLUCOMTR-MCNC: 163 MG/DL (ref 70–99)
GLUCOSE BLDC GLUCOMTR-MCNC: 164 MG/DL (ref 70–99)
GLUCOSE BLDC GLUCOMTR-MCNC: 165 MG/DL (ref 70–99)
GLUCOSE BLDC GLUCOMTR-MCNC: 167 MG/DL (ref 70–99)
GLUCOSE BLDC GLUCOMTR-MCNC: 167 MG/DL (ref 70–99)
GLUCOSE BLDC GLUCOMTR-MCNC: 168 MG/DL (ref 70–99)
GLUCOSE BLDC GLUCOMTR-MCNC: 169 MG/DL (ref 70–99)
GLUCOSE BLDC GLUCOMTR-MCNC: 171 MG/DL (ref 70–99)
GLUCOSE BLDC GLUCOMTR-MCNC: 173 MG/DL (ref 70–99)
GLUCOSE BLDC GLUCOMTR-MCNC: 175 MG/DL (ref 70–99)
GLUCOSE BLDC GLUCOMTR-MCNC: 175 MG/DL (ref 70–99)
GLUCOSE BLDC GLUCOMTR-MCNC: 179 MG/DL (ref 70–99)
GLUCOSE BLDC GLUCOMTR-MCNC: 179 MG/DL (ref 70–99)
GLUCOSE BLDC GLUCOMTR-MCNC: 182 MG/DL (ref 70–99)
GLUCOSE BLDC GLUCOMTR-MCNC: 184 MG/DL (ref 70–99)
GLUCOSE BLDC GLUCOMTR-MCNC: 185 MG/DL (ref 70–99)
GLUCOSE BLDC GLUCOMTR-MCNC: 188 MG/DL (ref 70–99)
GLUCOSE BLDC GLUCOMTR-MCNC: 194 MG/DL (ref 70–99)
GLUCOSE BLDC GLUCOMTR-MCNC: 197 MG/DL (ref 70–99)
GLUCOSE BLDC GLUCOMTR-MCNC: 197 MG/DL (ref 70–99)
GLUCOSE BLDC GLUCOMTR-MCNC: 203 MG/DL (ref 70–99)
GLUCOSE BLDC GLUCOMTR-MCNC: 207 MG/DL (ref 70–99)
GLUCOSE BLDC GLUCOMTR-MCNC: 217 MG/DL (ref 70–99)
GLUCOSE BLDC GLUCOMTR-MCNC: 219 MG/DL (ref 70–99)
GLUCOSE BLDC GLUCOMTR-MCNC: 232 MG/DL (ref 70–99)
GLUCOSE BLDC GLUCOMTR-MCNC: 91 MG/DL (ref 70–99)
GLUCOSE BLDC GLUCOMTR-MCNC: 96 MG/DL (ref 70–99)
GLUCOSE BLDC GLUCOMTR-MCNC: 97 MG/DL (ref 70–99)
GLUCOSE SERPL-MCNC: 110 MG/DL (ref 70–99)
GLUCOSE SERPL-MCNC: 115 MG/DL (ref 70–99)
GLUCOSE SERPL-MCNC: 121 MG/DL (ref 70–99)
GLUCOSE SERPL-MCNC: 125 MG/DL (ref 70–99)
GLUCOSE SERPL-MCNC: 130 MG/DL (ref 70–99)
GLUCOSE SERPL-MCNC: 134 MG/DL (ref 70–99)
GLUCOSE SERPL-MCNC: 143 MG/DL (ref 70–99)
GLUCOSE SERPL-MCNC: 147 MG/DL (ref 70–100)
GLUCOSE SERPL-MCNC: 151 MG/DL (ref 70–99)
GLUCOSE SERPL-MCNC: 152 MG/DL (ref 70–99)
GLUCOSE SERPL-MCNC: 155 MG/DL (ref 70–99)
GLUCOSE SERPL-MCNC: 163 MG/DL (ref 70–99)
GLUCOSE SERPL-MCNC: 164 MG/DL (ref 70–100)
GLUCOSE SERPL-MCNC: 167 MG/DL (ref 70–99)
GLUCOSE SERPL-MCNC: 167 MG/DL (ref 70–99)
GLUCOSE SERPL-MCNC: 178 MG/DL (ref 70–99)
GLUCOSE SERPL-MCNC: 191 MG/DL (ref 70–99)
GLUCOSE SERPL-MCNC: 204 MG/DL (ref 70–99)
GLUCOSE SERPL-MCNC: 223 MG/DL (ref 70–99)
GLUCOSE SERPL-MCNC: 93 MG/DL (ref 70–99)
GLUCOSE SERPL-MCNC: 93 MG/DL (ref 70–99)
GLUCOSE SERPL-MCNC: 99 MG/DL (ref 70–99)
GLUCOSE SERPL-MCNC: 99 MG/DL (ref 70–99)
GLUCOSE UR STRIP-MCNC: NEGATIVE MG/DL
GRAM STN SPEC: NORMAL
HBA1C MFR BLD: 5.5 % (ref 0–5.6)
HBA1C MFR BLD: 6.9 % (ref 0–5.6)
HCT VFR BLD AUTO: 24.3 % (ref 35–47)
HCT VFR BLD AUTO: 24.8 % (ref 35–47)
HCT VFR BLD AUTO: 25 % (ref 35–47)
HCT VFR BLD AUTO: 26.2 % (ref 35–47)
HCT VFR BLD AUTO: 27 % (ref 35–47)
HCT VFR BLD AUTO: 27.6 % (ref 35–47)
HCT VFR BLD AUTO: 28.1 % (ref 35–47)
HCT VFR BLD AUTO: 29.6 % (ref 35–47)
HCT VFR BLD AUTO: 34.7 % (ref 35–47)
HCT VFR BLD AUTO: 35.6 % (ref 35–47)
HCT VFR BLD AUTO: 37.4 % (ref 35–47)
HCT VFR BLD AUTO: 37.9 % (ref 35–47)
HCT VFR BLD AUTO: 39.4 % (ref 35–47)
HCT VFR BLD AUTO: 42.2 % (ref 35–47)
HEMOCCULT STL QL: POSITIVE
HEMOCCULT STL QL: POSITIVE
HEMOGLOBIN: 11.2 G/DL (ref 11.8–15.5)
HGB BLD-MCNC: 10.2 G/DL (ref 11.7–15.7)
HGB BLD-MCNC: 10.3 G/DL (ref 11.7–15.7)
HGB BLD-MCNC: 10.4 G/DL (ref 11.7–15.7)
HGB BLD-MCNC: 11 G/DL (ref 11.7–15.7)
HGB BLD-MCNC: 12.2 G/DL (ref 11.7–15.7)
HGB BLD-MCNC: 12.4 G/DL (ref 11.7–15.7)
HGB BLD-MCNC: 12.5 G/DL (ref 11.7–15.7)
HGB BLD-MCNC: 13 G/DL (ref 11.7–15.7)
HGB BLD-MCNC: 13.3 G/DL (ref 11.7–15.7)
HGB BLD-MCNC: 14.1 G/DL (ref 11.7–15.7)
HGB BLD-MCNC: 6.6 G/DL (ref 11.7–15.7)
HGB BLD-MCNC: 7 G/DL (ref 11.7–15.7)
HGB BLD-MCNC: 7.7 G/DL (ref 11.7–15.7)
HGB BLD-MCNC: 7.8 G/DL (ref 11.7–15.7)
HGB BLD-MCNC: 7.8 G/DL (ref 11.7–15.7)
HGB BLD-MCNC: 7.9 G/DL (ref 11.7–15.7)
HGB BLD-MCNC: 8.1 G/DL (ref 11.7–15.7)
HGB BLD-MCNC: 8.2 G/DL (ref 11.7–15.7)
HGB BLD-MCNC: 8.9 G/DL (ref 11.7–15.7)
HGB BLD-MCNC: 9 G/DL (ref 11.7–15.7)
HGB BLD-MCNC: 9.3 G/DL (ref 11.7–15.7)
HGB UR QL STRIP: ABNORMAL
HGB UR QL STRIP: ABNORMAL
HGB UR QL STRIP: NEGATIVE
HYALINE CASTS #/AREA URNS LPF: 1 /LPF (ref 0–2)
HYALINE CASTS #/AREA URNS LPF: 12 /LPF (ref 0–2)
HYALINE CASTS #/AREA URNS LPF: 18 /LPF (ref 0–2)
IMM GRANULOCYTES # BLD: 0 10E9/L (ref 0–0.4)
IMM GRANULOCYTES NFR BLD: 0.1 %
IMM GRANULOCYTES NFR BLD: 0.2 %
IMM GRANULOCYTES NFR BLD: 0.3 %
IMM GRANULOCYTES NFR BLD: 0.3 %
IMM GRANULOCYTES NFR BLD: 0.4 %
IMM GRANULOCYTES NFR BLD: 0.5 %
INR PPP: 1.54 (ref 0.86–1.14)
INTERPRETATION ECG - MUSE: NORMAL
IRON SATN MFR SERPL: 4 % (ref 15–46)
IRON SERPL-MCNC: 16 UG/DL (ref 35–180)
KETONES UR STRIP-MCNC: NEGATIVE MG/DL
LACTATE BLD-SCNC: 1.3 MMOL/L (ref 0.7–2)
LACTATE SERPL-SCNC: 2.1 MMOL/L (ref 0.4–2)
LEUKOCYTE ESTERASE UR QL STRIP: ABNORMAL
LEUKOCYTE ESTERASE UR QL STRIP: NEGATIVE
LIPASE SERPL-CCNC: 62 U/L (ref 73–393)
LIPASE SERPL-CCNC: 78 U/L (ref 73–393)
LYMPHOCYTES # BLD AUTO: 0.5 10E9/L (ref 0.8–5.3)
LYMPHOCYTES # BLD AUTO: 0.8 10E9/L (ref 0.8–5.3)
LYMPHOCYTES # BLD AUTO: 0.9 10E9/L (ref 0.8–5.3)
LYMPHOCYTES # BLD AUTO: 0.9 10E9/L (ref 0.8–5.3)
LYMPHOCYTES # BLD AUTO: 1.1 10E9/L (ref 0.8–5.3)
LYMPHOCYTES NFR BLD AUTO: 11.1 %
LYMPHOCYTES NFR BLD AUTO: 12.2 %
LYMPHOCYTES NFR BLD AUTO: 13.7 %
LYMPHOCYTES NFR BLD AUTO: 13.8 %
LYMPHOCYTES NFR BLD AUTO: 15.1 %
LYMPHOCYTES NFR BLD AUTO: 6.8 %
LYMPHOCYTES NFR BLD AUTO: 8.1 %
Lab: ABNORMAL
Lab: NORMAL
M PROTEIN SERPL ELPH-MCNC: 0.1 G/DL
MAGNESIUM SERPL-MCNC: 3 MG/DL (ref 1.6–2.3)
MAGNESIUM SERPL-MCNC: 3.9 MG/DL (ref 1.6–2.3)
MAGNESIUM SERPL-MCNC: 4.9 MG/DL (ref 1.6–2.3)
MCH RBC QN AUTO: 22.9 PG (ref 26.5–33)
MCH RBC QN AUTO: 23.9 PG (ref 26.5–33)
MCH RBC QN AUTO: 24.1 PG (ref 26.5–33)
MCH RBC QN AUTO: 24.4 PG (ref 26.5–33)
MCH RBC QN AUTO: 24.5 PG (ref 26.5–33)
MCH RBC QN AUTO: 28.6 PG (ref 26.5–33)
MCH RBC QN AUTO: 28.8 PG (ref 26.5–33)
MCH RBC QN AUTO: 28.9 PG (ref 26.5–33)
MCH RBC QN AUTO: 29.1 PG (ref 26.5–33)
MCH RBC QN AUTO: 31.7 PG (ref 26.5–33)
MCH RBC QN AUTO: 32.3 PG (ref 26.5–33)
MCH RBC QN AUTO: 32.3 PG (ref 26.5–33)
MCH RBC QN AUTO: 32.5 PG (ref 26.5–33)
MCH RBC QN AUTO: 33.1 PG (ref 26.5–33)
MCHC RBC AUTO-ENTMCNC: 26.6 G/DL (ref 31.5–36.5)
MCHC RBC AUTO-ENTMCNC: 28.5 G/DL (ref 31.5–36.5)
MCHC RBC AUTO-ENTMCNC: 28.6 G/DL (ref 31.5–36.5)
MCHC RBC AUTO-ENTMCNC: 28.8 G/DL (ref 31.5–36.5)
MCHC RBC AUTO-ENTMCNC: 29.4 G/DL (ref 31.5–36.5)
MCHC RBC AUTO-ENTMCNC: 31.2 G/DL (ref 31.5–36.5)
MCHC RBC AUTO-ENTMCNC: 31.4 G/DL (ref 31.5–36.5)
MCHC RBC AUTO-ENTMCNC: 31.7 G/DL (ref 31.5–36.5)
MCHC RBC AUTO-ENTMCNC: 31.7 G/DL (ref 31.5–36.5)
MCHC RBC AUTO-ENTMCNC: 33.4 G/DL (ref 31.5–36.5)
MCHC RBC AUTO-ENTMCNC: 33.4 G/DL (ref 31.5–36.5)
MCHC RBC AUTO-ENTMCNC: 33.8 G/DL (ref 31.5–36.5)
MCHC RBC AUTO-ENTMCNC: 34.3 G/DL (ref 31.5–36.5)
MCHC RBC AUTO-ENTMCNC: 34.3 G/DL (ref 31.5–36.5)
MCV RBC AUTO: 81 FL (ref 78–100)
MCV RBC AUTO: 84 FL (ref 78–100)
MCV RBC AUTO: 85 FL (ref 78–100)
MCV RBC AUTO: 86 FL (ref 78–100)
MCV RBC AUTO: 86 FL (ref 78–100)
MCV RBC AUTO: 91 FL (ref 78–100)
MCV RBC AUTO: 91 FL (ref 78–100)
MCV RBC AUTO: 92 FL (ref 78–100)
MCV RBC AUTO: 93 FL (ref 78–100)
MCV RBC AUTO: 94 FL (ref 78–100)
MCV RBC AUTO: 95 FL (ref 78–100)
MCV RBC AUTO: 95 FL (ref 78–100)
MCV RBC AUTO: 96 FL (ref 78–100)
MCV RBC AUTO: 99 FL (ref 78–100)
MONOCYTES # BLD AUTO: 0.4 10E9/L (ref 0–1.3)
MONOCYTES # BLD AUTO: 0.6 10E9/L (ref 0–1.3)
MONOCYTES # BLD AUTO: 0.7 10E9/L (ref 0–1.3)
MONOCYTES # BLD AUTO: 0.8 10E9/L (ref 0–1.3)
MONOCYTES # BLD AUTO: 1 10E9/L (ref 0–1.3)
MONOCYTES # BLD AUTO: 1.2 10E9/L (ref 0–1.3)
MONOCYTES # BLD AUTO: 1.4 10E9/L (ref 0–1.3)
MONOCYTES NFR BLD AUTO: 11.6 %
MONOCYTES NFR BLD AUTO: 14.1 %
MONOCYTES NFR BLD AUTO: 14.3 %
MONOCYTES NFR BLD AUTO: 14.7 %
MONOCYTES NFR BLD AUTO: 15.9 %
MONOCYTES NFR BLD AUTO: 4.7 %
MONOCYTES NFR BLD AUTO: 9.2 %
MUCOUS THREADS #/AREA URNS LPF: PRESENT /LPF
NEUTROPHILS # BLD AUTO: 3.3 10E9/L (ref 1.6–8.3)
NEUTROPHILS # BLD AUTO: 4.6 10E9/L (ref 1.6–8.3)
NEUTROPHILS # BLD AUTO: 4.7 10E9/L (ref 1.6–8.3)
NEUTROPHILS # BLD AUTO: 5.2 10E9/L (ref 1.6–8.3)
NEUTROPHILS # BLD AUTO: 5.3 10E9/L (ref 1.6–8.3)
NEUTROPHILS # BLD AUTO: 6.5 10E9/L (ref 1.6–8.3)
NEUTROPHILS # BLD AUTO: 7.3 10E9/L (ref 1.6–8.3)
NEUTROPHILS NFR BLD AUTO: 66.1 %
NEUTROPHILS NFR BLD AUTO: 67.2 %
NEUTROPHILS NFR BLD AUTO: 69.3 %
NEUTROPHILS NFR BLD AUTO: 74.2 %
NEUTROPHILS NFR BLD AUTO: 74.8 %
NEUTROPHILS NFR BLD AUTO: 75.9 %
NEUTROPHILS NFR BLD AUTO: 82.7 %
NITRATE UR QL: NEGATIVE
NRBC # BLD AUTO: 0 10*3/UL
NRBC # BLD AUTO: 0.1 10*3/UL
NRBC BLD AUTO-RTO: 0 /100
NRBC BLD AUTO-RTO: 1 /100
NRBC BLD AUTO-RTO: 2 /100
NT-PROBNP SERPL-MCNC: 6532 PG/ML (ref 0–1800)
NUM BPU REQUESTED: 2
NUM BPU REQUESTED: 2
PH UR STRIP: 5 PH (ref 5–7)
PH UR STRIP: 6 PH (ref 5–7)
PH UR STRIP: 7 PH (ref 5–7)
PH UR STRIP: 7.5 PH (ref 5–7)
PH UR STRIP: 8 PH (ref 5–7)
PHOSPHATE SERPL-MCNC: 4 MG/DL (ref 2.5–4.5)
PHOSPHATE SERPL-MCNC: 4.1 MG/DL (ref 2.5–4.5)
PHOSPHATE SERPL-MCNC: 4.1 MG/DL (ref 2.5–4.5)
PLATELET # BLD AUTO: 172 10E9/L (ref 150–450)
PLATELET # BLD AUTO: 177 10E9/L (ref 150–450)
PLATELET # BLD AUTO: 181 10E9/L (ref 150–450)
PLATELET # BLD AUTO: 184 10E9/L (ref 150–450)
PLATELET # BLD AUTO: 195 10E9/L (ref 150–450)
PLATELET # BLD AUTO: 202 10E9/L (ref 150–450)
PLATELET # BLD AUTO: 203 10E9/L (ref 150–450)
PLATELET # BLD AUTO: 217 10E9/L (ref 150–450)
PLATELET # BLD AUTO: 223 10E9/L (ref 150–450)
PLATELET # BLD AUTO: 227 10E9/L (ref 150–450)
PLATELET # BLD AUTO: 233 10E9/L (ref 150–450)
PLATELET # BLD AUTO: 254 10E9/L (ref 150–450)
PLATELET # BLD AUTO: 257 10E9/L (ref 150–450)
PLATELET # BLD AUTO: 260 10E9/L (ref 150–450)
PLATELET # BLD AUTO: 274 10E9/L (ref 150–450)
PLATELET # BLD AUTO: 296 10E9/L (ref 150–450)
POTASSIUM SERPL-SCNC: 3.6 MMOL/L (ref 3.4–5.3)
POTASSIUM SERPL-SCNC: 3.6 MMOL/L (ref 3.4–5.3)
POTASSIUM SERPL-SCNC: 3.7 MMOL/L (ref 3.4–5.3)
POTASSIUM SERPL-SCNC: 3.8 MMOL/L (ref 3.4–5.3)
POTASSIUM SERPL-SCNC: 3.9 MMOL/L (ref 3.4–5.3)
POTASSIUM SERPL-SCNC: 4 MMOL/L (ref 3.4–5.3)
POTASSIUM SERPL-SCNC: 4 MMOL/L (ref 3.4–5.3)
POTASSIUM SERPL-SCNC: 4.1 MMOL/L (ref 3.4–5.3)
POTASSIUM SERPL-SCNC: 4.1 MMOL/L (ref 3.4–5.3)
POTASSIUM SERPL-SCNC: 4.1 MMOL/L (ref 3.5–5.2)
POTASSIUM SERPL-SCNC: 4.2 MMOL/L (ref 3.4–5.3)
POTASSIUM SERPL-SCNC: 4.2 MMOL/L (ref 3.4–5.3)
POTASSIUM SERPL-SCNC: 4.3 MMOL/L (ref 3.4–5.3)
POTASSIUM SERPL-SCNC: 4.4 MMOL/L (ref 3.4–5.3)
POTASSIUM SERPL-SCNC: 4.8 MMOL/L (ref 3.4–5.3)
POTASSIUM SERPL-SCNC: 4.8 MMOL/L (ref 3.4–5.3)
POTASSIUM SERPL-SCNC: 5 MMOL/L (ref 3.4–5.3)
POTASSIUM SERPL-SCNC: 5.1 MMOL/L (ref 3.5–5.2)
POTASSIUM SERPL-SCNC: 5.3 MMOL/L (ref 3.4–5.3)
POTASSIUM SERPL-SCNC: 5.3 MMOL/L (ref 3.4–5.3)
POTASSIUM SERPL-SCNC: 5.5 MMOL/L (ref 3.4–5.3)
PROCALCITONIN SERPL-MCNC: <0.05 NG/ML
PROT PATTERN SERPL ELPH-IMP: ABNORMAL
PROT SERPL-MCNC: 5.7 G/DL (ref 6.8–8.8)
PROT SERPL-MCNC: 5.8 G/DL (ref 6.8–8.8)
PROT SERPL-MCNC: 6 G/DL (ref 6.8–8.8)
PROT SERPL-MCNC: 6.3 G/DL (ref 6.4–8.3)
PROT SERPL-MCNC: 6.5 G/DL (ref 6.4–8.3)
PROT SERPL-MCNC: 6.5 G/DL (ref 6.8–8.8)
PROT SERPL-MCNC: 6.8 G/DL (ref 6.8–8.8)
PROT SERPL-MCNC: 7.1 G/DL (ref 6.8–8.8)
PROT SERPL-MCNC: 7.5 G/DL (ref 6.8–8.8)
PROT SERPL-MCNC: 7.6 G/DL (ref 6.8–8.8)
PROT SERPL-MCNC: 7.6 G/DL (ref 6.8–8.8)
PTH-INTACT SERPL-MCNC: 119 PG/ML (ref 18–80)
RBC # BLD AUTO: 2.73 10E12/L (ref 3.8–5.2)
RBC # BLD AUTO: 2.88 10E12/L (ref 3.8–5.2)
RBC # BLD AUTO: 2.9 10E12/L (ref 3.8–5.2)
RBC # BLD AUTO: 3.08 10E12/L (ref 3.8–5.2)
RBC # BLD AUTO: 3.14 10E12/L (ref 3.8–5.2)
RBC # BLD AUTO: 3.2 10E12/L (ref 3.8–5.2)
RBC # BLD AUTO: 3.22 10E12/L (ref 3.8–5.2)
RBC # BLD AUTO: 3.24 10E12/L (ref 3.8–5.2)
RBC # BLD AUTO: 3.75 10E12/L (ref 3.8–5.2)
RBC # BLD AUTO: 3.82 10E12/L (ref 3.8–5.2)
RBC # BLD AUTO: 3.94 10E12/L (ref 3.8–5.2)
RBC # BLD AUTO: 4.02 10E12/L (ref 3.8–5.2)
RBC # BLD AUTO: 4.12 10E12/L (ref 3.8–5.2)
RBC # BLD AUTO: 4.26 10E12/L (ref 3.8–5.2)
RBC #/AREA URNS AUTO: 0 /HPF (ref 0–2)
RBC #/AREA URNS AUTO: 12 /HPF (ref 0–2)
RBC #/AREA URNS AUTO: 2 /HPF (ref 0–2)
RBC #/AREA URNS AUTO: 2 /HPF (ref 0–2)
RBC #/AREA URNS AUTO: 5 /HPF (ref 0–2)
SODIUM SERPL-SCNC: 129 MMOL/L (ref 133–144)
SODIUM SERPL-SCNC: 132 MMOL/L (ref 133–144)
SODIUM SERPL-SCNC: 133 MMOL/L (ref 133–144)
SODIUM SERPL-SCNC: 134 MMOL/L (ref 133–144)
SODIUM SERPL-SCNC: 135 MMOL/L (ref 133–144)
SODIUM SERPL-SCNC: 137 MMOL/L (ref 133–144)
SODIUM SERPL-SCNC: 137 MMOL/L (ref 133–144)
SODIUM SERPL-SCNC: 138 MMOL/L (ref 133–144)
SODIUM SERPL-SCNC: 138 MMOL/L (ref 133–144)
SODIUM SERPL-SCNC: 138 MMOL/L (ref 136–145)
SODIUM SERPL-SCNC: 138 MMOL/L (ref 136–145)
SODIUM SERPL-SCNC: 139 MMOL/L (ref 133–144)
SODIUM SERPL-SCNC: 139 MMOL/L (ref 133–144)
SODIUM SERPL-SCNC: 140 MMOL/L (ref 133–144)
SODIUM SERPL-SCNC: 141 MMOL/L (ref 133–144)
SODIUM SERPL-SCNC: 142 MMOL/L (ref 133–144)
SODIUM SERPL-SCNC: 143 MMOL/L (ref 133–144)
SODIUM SERPL-SCNC: 143 MMOL/L (ref 133–144)
SODIUM SERPL-SCNC: 144 MMOL/L (ref 133–144)
SODIUM UR-SCNC: 10 MMOL/L
SODIUM UR-SCNC: 11 MMOL/L
SOURCE: ABNORMAL
SP GR UR STRIP: 1.01 (ref 1–1.03)
SP GR UR STRIP: 1.02 (ref 1–1.03)
SP GR UR STRIP: 1.02 (ref 1–1.03)
SPECIMEN EXP DATE BLD: NORMAL
SPECIMEN EXP DATE BLD: NORMAL
SPECIMEN SOURCE: ABNORMAL
SPECIMEN SOURCE: NORMAL
SQUAMOUS #/AREA URNS AUTO: 13 /HPF (ref 0–1)
SQUAMOUS #/AREA URNS AUTO: 21 /HPF (ref 0–1)
SQUAMOUS #/AREA URNS AUTO: 6 /HPF (ref 0–1)
SQUAMOUS #/AREA URNS AUTO: <1 /HPF (ref 0–1)
TIBC SERPL-MCNC: 389 UG/DL (ref 240–430)
TRANSFUSION STATUS PATIENT QL: NORMAL
TROPONIN I SERPL-MCNC: <0.015 UG/L (ref 0–0.04)
TROPONIN I SERPL-MCNC: <0.015 UG/L (ref 0–0.04)
TSH SERPL DL<=0.005 MIU/L-ACNC: 3.62 MU/L (ref 0.4–4)
UROBILINOGEN UR STRIP-MCNC: NORMAL MG/DL (ref 0–2)
VIT B12 SERPL-MCNC: 418 PG/ML (ref 193–986)
WBC # BLD AUTO: 10.5 10E9/L (ref 4–11)
WBC # BLD AUTO: 11.6 10E9/L (ref 4–11)
WBC # BLD AUTO: 5 10E9/L (ref 4–11)
WBC # BLD AUTO: 5.9 10E9/L (ref 4–11)
WBC # BLD AUTO: 6.3 10E9/L (ref 4–11)
WBC # BLD AUTO: 6.4 10E9/L (ref 4–11)
WBC # BLD AUTO: 6.7 10E9/L (ref 4–11)
WBC # BLD AUTO: 6.8 10E9/L (ref 4–11)
WBC # BLD AUTO: 7.2 10E9/L (ref 4–11)
WBC # BLD AUTO: 7.2 10E9/L (ref 4–11)
WBC # BLD AUTO: 7.8 10E9/L (ref 4–11)
WBC # BLD AUTO: 7.9 10E9/L (ref 4–11)
WBC # BLD AUTO: 9.1 10E9/L (ref 4–11)
WBC # BLD AUTO: 9.7 10E9/L (ref 4–11)
WBC #/AREA URNS AUTO: 1 /HPF (ref 0–5)
WBC #/AREA URNS AUTO: 23 /HPF (ref 0–5)
WBC #/AREA URNS AUTO: 5 /HPF (ref 0–5)
WBC #/AREA URNS AUTO: 8 /HPF (ref 0–5)
WBC #/AREA URNS AUTO: 9 /HPF (ref 0–5)

## 2018-01-01 PROCEDURE — 25000125 ZZHC RX 250: Performed by: INTERNAL MEDICINE

## 2018-01-01 PROCEDURE — 25000128 H RX IP 250 OP 636: Performed by: HOSPITALIST

## 2018-01-01 PROCEDURE — 25000128 H RX IP 250 OP 636: Performed by: ORTHOPAEDIC SURGERY

## 2018-01-01 PROCEDURE — 80053 COMPREHEN METABOLIC PANEL: CPT | Performed by: INTERNAL MEDICINE

## 2018-01-01 PROCEDURE — 97116 GAIT TRAINING THERAPY: CPT | Mod: GP | Performed by: PHYSICAL THERAPIST

## 2018-01-01 PROCEDURE — 25000132 ZZH RX MED GY IP 250 OP 250 PS 637: Mod: GY | Performed by: INTERNAL MEDICINE

## 2018-01-01 PROCEDURE — 36415 COLL VENOUS BLD VENIPUNCTURE: CPT | Performed by: INTERNAL MEDICINE

## 2018-01-01 PROCEDURE — A9270 NON-COVERED ITEM OR SERVICE: HCPCS | Mod: GY | Performed by: INTERNAL MEDICINE

## 2018-01-01 PROCEDURE — 80048 BASIC METABOLIC PNL TOTAL CA: CPT | Performed by: HOSPITALIST

## 2018-01-01 PROCEDURE — 87075 CULTR BACTERIA EXCEPT BLOOD: CPT | Performed by: ORTHOPAEDIC SURGERY

## 2018-01-01 PROCEDURE — 25000132 ZZH RX MED GY IP 250 OP 250 PS 637: Mod: GY | Performed by: HOSPITALIST

## 2018-01-01 PROCEDURE — 25000128 H RX IP 250 OP 636: Performed by: ANESTHESIOLOGY

## 2018-01-01 PROCEDURE — 85610 PROTHROMBIN TIME: CPT | Performed by: EMERGENCY MEDICINE

## 2018-01-01 PROCEDURE — 85652 RBC SED RATE AUTOMATED: CPT | Performed by: INTERNAL MEDICINE

## 2018-01-01 PROCEDURE — A9270 NON-COVERED ITEM OR SERVICE: HCPCS | Mod: GY | Performed by: HOSPITALIST

## 2018-01-01 PROCEDURE — 40000133 ZZH STATISTIC OT WARD VISIT: Performed by: REHABILITATION PRACTITIONER

## 2018-01-01 PROCEDURE — 25000132 ZZH RX MED GY IP 250 OP 250 PS 637: Mod: GY | Performed by: ORTHOPAEDIC SURGERY

## 2018-01-01 PROCEDURE — 97110 THERAPEUTIC EXERCISES: CPT | Mod: GP

## 2018-01-01 PROCEDURE — 25000132 ZZH RX MED GY IP 250 OP 250 PS 637: Mod: GY | Performed by: PHYSICIAN ASSISTANT

## 2018-01-01 PROCEDURE — 36415 COLL VENOUS BLD VENIPUNCTURE: CPT | Performed by: HOSPITALIST

## 2018-01-01 PROCEDURE — 40000193 ZZH STATISTIC PT WARD VISIT

## 2018-01-01 PROCEDURE — 25000128 H RX IP 250 OP 636: Performed by: INTERNAL MEDICINE

## 2018-01-01 PROCEDURE — 99213 OFFICE O/P EST LOW 20 MIN: CPT | Mod: ZP | Performed by: SURGERY

## 2018-01-01 PROCEDURE — A9270 NON-COVERED ITEM OR SERVICE: HCPCS | Mod: GY | Performed by: ORTHOPAEDIC SURGERY

## 2018-01-01 PROCEDURE — 80053 COMPREHEN METABOLIC PANEL: CPT | Performed by: EMERGENCY MEDICINE

## 2018-01-01 PROCEDURE — 36415 COLL VENOUS BLD VENIPUNCTURE: CPT | Performed by: PHYSICIAN ASSISTANT

## 2018-01-01 PROCEDURE — 74177 CT ABD & PELVIS W/CONTRAST: CPT

## 2018-01-01 PROCEDURE — 99232 SBSQ HOSP IP/OBS MODERATE 35: CPT | Performed by: HOSPITALIST

## 2018-01-01 PROCEDURE — 99233 SBSQ HOSP IP/OBS HIGH 50: CPT | Performed by: HOSPITALIST

## 2018-01-01 PROCEDURE — 40000133 ZZH STATISTIC OT WARD VISIT

## 2018-01-01 PROCEDURE — P9016 RBC LEUKOCYTES REDUCED: HCPCS | Performed by: EMERGENCY MEDICINE

## 2018-01-01 PROCEDURE — 27210794 ZZH OR GENERAL SUPPLY STERILE: Performed by: ORTHOPAEDIC SURGERY

## 2018-01-01 PROCEDURE — P9041 ALBUMIN (HUMAN),5%, 50ML: HCPCS | Performed by: NURSE ANESTHETIST, CERTIFIED REGISTERED

## 2018-01-01 PROCEDURE — 94640 AIRWAY INHALATION TREATMENT: CPT | Mod: 76

## 2018-01-01 PROCEDURE — 25000125 ZZHC RX 250: Performed by: NURSE ANESTHETIST, CERTIFIED REGISTERED

## 2018-01-01 PROCEDURE — 97530 THERAPEUTIC ACTIVITIES: CPT | Mod: GO

## 2018-01-01 PROCEDURE — 12000007 ZZH R&B INTERMEDIATE

## 2018-01-01 PROCEDURE — 40000193 ZZH STATISTIC PT WARD VISIT: Performed by: PHYSICAL THERAPIST

## 2018-01-01 PROCEDURE — 00000146 ZZHCL STATISTIC GLUCOSE BY METER IP

## 2018-01-01 PROCEDURE — 25000131 ZZH RX MED GY IP 250 OP 636 PS 637: Mod: GY | Performed by: INTERNAL MEDICINE

## 2018-01-01 PROCEDURE — A9270 NON-COVERED ITEM OR SERVICE: HCPCS | Mod: GY

## 2018-01-01 PROCEDURE — 25000125 ZZHC RX 250: Performed by: ORTHOPAEDIC SURGERY

## 2018-01-01 PROCEDURE — 86140 C-REACTIVE PROTEIN: CPT | Performed by: INTERNAL MEDICINE

## 2018-01-01 PROCEDURE — 82306 VITAMIN D 25 HYDROXY: CPT | Performed by: INTERNAL MEDICINE

## 2018-01-01 PROCEDURE — 83605 ASSAY OF LACTIC ACID: CPT | Performed by: HOSPITALIST

## 2018-01-01 PROCEDURE — 71045 X-RAY EXAM CHEST 1 VIEW: CPT

## 2018-01-01 PROCEDURE — 82272 OCCULT BLD FECES 1-3 TESTS: CPT | Performed by: EMERGENCY MEDICINE

## 2018-01-01 PROCEDURE — 96374 THER/PROPH/DIAG INJ IV PUSH: CPT

## 2018-01-01 PROCEDURE — 40000985 XR PELVIS AND HIP PORTABLE LEFT 1 VIEW

## 2018-01-01 PROCEDURE — 99285 EMERGENCY DEPT VISIT HI MDM: CPT | Mod: 25

## 2018-01-01 PROCEDURE — 85027 COMPLETE CBC AUTOMATED: CPT | Performed by: INTERNAL MEDICINE

## 2018-01-01 PROCEDURE — 83036 HEMOGLOBIN GLYCOSYLATED A1C: CPT | Performed by: INTERNAL MEDICINE

## 2018-01-01 PROCEDURE — 97116 GAIT TRAINING THERAPY: CPT | Mod: GP

## 2018-01-01 PROCEDURE — 99223 1ST HOSP IP/OBS HIGH 75: CPT | Mod: AI | Performed by: INTERNAL MEDICINE

## 2018-01-01 PROCEDURE — 97530 THERAPEUTIC ACTIVITIES: CPT | Mod: GP | Performed by: PHYSICAL THERAPIST

## 2018-01-01 PROCEDURE — 83605 ASSAY OF LACTIC ACID: CPT | Performed by: EMERGENCY MEDICINE

## 2018-01-01 PROCEDURE — 99220 ZZC INITIAL OBSERVATION CARE,LEVL III: CPT | Performed by: INTERNAL MEDICINE

## 2018-01-01 PROCEDURE — 25000132 ZZH RX MED GY IP 250 OP 250 PS 637: Mod: GY

## 2018-01-01 PROCEDURE — 85025 COMPLETE CBC W/AUTO DIFF WBC: CPT | Performed by: EMERGENCY MEDICINE

## 2018-01-01 PROCEDURE — 12000000 ZZH R&B MED SURG/OB

## 2018-01-01 PROCEDURE — 80048 BASIC METABOLIC PNL TOTAL CA: CPT | Performed by: INTERNAL MEDICINE

## 2018-01-01 PROCEDURE — 87186 SC STD MICRODIL/AGAR DIL: CPT | Performed by: INTERNAL MEDICINE

## 2018-01-01 PROCEDURE — 71000012 ZZH RECOVERY PHASE 1 LEVEL 1 FIRST HR: Performed by: ORTHOPAEDIC SURGERY

## 2018-01-01 PROCEDURE — P9016 RBC LEUKOCYTES REDUCED: HCPCS | Performed by: INTERNAL MEDICINE

## 2018-01-01 PROCEDURE — 96372 THER/PROPH/DIAG INJ SC/IM: CPT

## 2018-01-01 PROCEDURE — 83735 ASSAY OF MAGNESIUM: CPT | Performed by: HOSPITALIST

## 2018-01-01 PROCEDURE — 94640 AIRWAY INHALATION TREATMENT: CPT

## 2018-01-01 PROCEDURE — 36000052 ZZH SURGERY LEVEL 2 EA 15 ADDTL MIN: Performed by: ORTHOPAEDIC SURGERY

## 2018-01-01 PROCEDURE — 83880 ASSAY OF NATRIURETIC PEPTIDE: CPT | Performed by: EMERGENCY MEDICINE

## 2018-01-01 PROCEDURE — 96361 HYDRATE IV INFUSION ADD-ON: CPT

## 2018-01-01 PROCEDURE — 25000128 H RX IP 250 OP 636: Performed by: NURSE ANESTHETIST, CERTIFIED REGISTERED

## 2018-01-01 PROCEDURE — 99214 OFFICE O/P EST MOD 30 MIN: CPT | Performed by: INTERNAL MEDICINE

## 2018-01-01 PROCEDURE — 93005 ELECTROCARDIOGRAM TRACING: CPT

## 2018-01-01 PROCEDURE — 84484 ASSAY OF TROPONIN QUANT: CPT | Performed by: EMERGENCY MEDICINE

## 2018-01-01 PROCEDURE — 71046 X-RAY EXAM CHEST 2 VIEWS: CPT

## 2018-01-01 PROCEDURE — 93922 UPR/L XTREMITY ART 2 LEVELS: CPT

## 2018-01-01 PROCEDURE — 84145 PROCALCITONIN (PCT): CPT | Performed by: HOSPITALIST

## 2018-01-01 PROCEDURE — 87186 SC STD MICRODIL/AGAR DIL: CPT | Performed by: ORTHOPAEDIC SURGERY

## 2018-01-01 PROCEDURE — 99232 SBSQ HOSP IP/OBS MODERATE 35: CPT | Performed by: INTERNAL MEDICINE

## 2018-01-01 PROCEDURE — 84300 ASSAY OF URINE SODIUM: CPT | Performed by: HOSPITALIST

## 2018-01-01 PROCEDURE — 85730 THROMBOPLASTIN TIME PARTIAL: CPT | Performed by: EMERGENCY MEDICINE

## 2018-01-01 PROCEDURE — 82728 ASSAY OF FERRITIN: CPT | Performed by: INTERNAL MEDICINE

## 2018-01-01 PROCEDURE — G0378 HOSPITAL OBSERVATION PER HR: HCPCS

## 2018-01-01 PROCEDURE — 99207 ZZC CDG-MDM COMPONENT: MEETS LOW - DOWN CODED: CPT | Performed by: HOSPITALIST

## 2018-01-01 PROCEDURE — 81001 URINALYSIS AUTO W/SCOPE: CPT | Performed by: EMERGENCY MEDICINE

## 2018-01-01 PROCEDURE — 37000008 ZZH ANESTHESIA TECHNICAL FEE, 1ST 30 MIN: Performed by: ORTHOPAEDIC SURGERY

## 2018-01-01 PROCEDURE — 25000125 ZZHC RX 250: Performed by: ANESTHESIOLOGY

## 2018-01-01 PROCEDURE — 74019 RADEX ABDOMEN 2 VIEWS: CPT

## 2018-01-01 PROCEDURE — 40000275 ZZH STATISTIC RCP TIME EA 10 MIN

## 2018-01-01 PROCEDURE — 0SRS039 REPLACEMENT OF LEFT HIP JOINT, FEMORAL SURFACE WITH CERAMIC SYNTHETIC SUBSTITUTE, CEMENTED, OPEN APPROACH: ICD-10-PCS | Performed by: ORTHOPAEDIC SURGERY

## 2018-01-01 PROCEDURE — 82607 VITAMIN B-12: CPT | Performed by: INTERNAL MEDICINE

## 2018-01-01 PROCEDURE — 25800025 ZZH RX 258: Performed by: ORTHOPAEDIC SURGERY

## 2018-01-01 PROCEDURE — 81001 URINALYSIS AUTO W/SCOPE: CPT | Performed by: HOSPITALIST

## 2018-01-01 PROCEDURE — 85049 AUTOMATED PLATELET COUNT: CPT | Performed by: INTERNAL MEDICINE

## 2018-01-01 PROCEDURE — 83970 ASSAY OF PARATHORMONE: CPT | Performed by: INTERNAL MEDICINE

## 2018-01-01 PROCEDURE — 36415 COLL VENOUS BLD VENIPUNCTURE: CPT | Performed by: ORTHOPAEDIC SURGERY

## 2018-01-01 PROCEDURE — 83735 ASSAY OF MAGNESIUM: CPT | Performed by: INTERNAL MEDICINE

## 2018-01-01 PROCEDURE — 99233 SBSQ HOSP IP/OBS HIGH 50: CPT | Performed by: INTERNAL MEDICINE

## 2018-01-01 PROCEDURE — 25000131 ZZH RX MED GY IP 250 OP 636 PS 637: Mod: GY | Performed by: HOSPITALIST

## 2018-01-01 PROCEDURE — 36000093 ZZH SURGERY LEVEL 4 1ST 30 MIN: Performed by: ORTHOPAEDIC SURGERY

## 2018-01-01 PROCEDURE — 85018 HEMOGLOBIN: CPT | Performed by: HOSPITALIST

## 2018-01-01 PROCEDURE — 25000125 ZZHC RX 250: Performed by: PHYSICIAN ASSISTANT

## 2018-01-01 PROCEDURE — 84165 PROTEIN E-PHORESIS SERUM: CPT | Performed by: INTERNAL MEDICINE

## 2018-01-01 PROCEDURE — 99222 1ST HOSP IP/OBS MODERATE 55: CPT | Mod: AI | Performed by: INTERNAL MEDICINE

## 2018-01-01 PROCEDURE — 25000128 H RX IP 250 OP 636: Performed by: EMERGENCY MEDICINE

## 2018-01-01 PROCEDURE — 87070 CULTURE OTHR SPECIMN AEROBIC: CPT | Performed by: INTERNAL MEDICINE

## 2018-01-01 PROCEDURE — 97110 THERAPEUTIC EXERCISES: CPT | Mod: GP | Performed by: PHYSICAL THERAPIST

## 2018-01-01 PROCEDURE — 85027 COMPLETE CBC AUTOMATED: CPT | Performed by: HOSPITALIST

## 2018-01-01 PROCEDURE — 97535 SELF CARE MNGMENT TRAINING: CPT | Mod: GO | Performed by: REHABILITATION PRACTITIONER

## 2018-01-01 PROCEDURE — 85018 HEMOGLOBIN: CPT | Performed by: PHYSICIAN ASSISTANT

## 2018-01-01 PROCEDURE — 36000063 ZZH SURGERY LEVEL 4 EA 15 ADDTL MIN: Performed by: ORTHOPAEDIC SURGERY

## 2018-01-01 PROCEDURE — 99283 EMERGENCY DEPT VISIT LOW MDM: CPT

## 2018-01-01 PROCEDURE — 80053 COMPREHEN METABOLIC PANEL: CPT | Performed by: HOSPITALIST

## 2018-01-01 PROCEDURE — 70450 CT HEAD/BRAIN W/O DYE: CPT

## 2018-01-01 PROCEDURE — 71000013 ZZH RECOVERY PHASE 1 LEVEL 1 EA ADDTL HR: Performed by: ORTHOPAEDIC SURGERY

## 2018-01-01 PROCEDURE — 99309 SBSQ NF CARE MODERATE MDM 30: CPT | Performed by: NURSE PRACTITIONER

## 2018-01-01 PROCEDURE — 40000556 ZZH STATISTIC PERIPHERAL IV START W US GUIDANCE

## 2018-01-01 PROCEDURE — 87086 URINE CULTURE/COLONY COUNT: CPT | Performed by: EMERGENCY MEDICINE

## 2018-01-01 PROCEDURE — 83036 HEMOGLOBIN GLYCOSYLATED A1C: CPT | Performed by: EMERGENCY MEDICINE

## 2018-01-01 PROCEDURE — 97530 THERAPEUTIC ACTIVITIES: CPT | Mod: GP

## 2018-01-01 PROCEDURE — 99239 HOSP IP/OBS DSCHRG MGMT >30: CPT | Performed by: HOSPITALIST

## 2018-01-01 PROCEDURE — 80069 RENAL FUNCTION PANEL: CPT | Performed by: INTERNAL MEDICINE

## 2018-01-01 PROCEDURE — 83690 ASSAY OF LIPASE: CPT | Performed by: EMERGENCY MEDICINE

## 2018-01-01 PROCEDURE — 40000133 ZZH STATISTIC OT WARD VISIT: Performed by: OCCUPATIONAL THERAPY ASSISTANT

## 2018-01-01 PROCEDURE — 83540 ASSAY OF IRON: CPT | Performed by: INTERNAL MEDICINE

## 2018-01-01 PROCEDURE — 80076 HEPATIC FUNCTION PANEL: CPT | Performed by: HOSPITALIST

## 2018-01-01 PROCEDURE — C1776 JOINT DEVICE (IMPLANTABLE): HCPCS | Performed by: ORTHOPAEDIC SURGERY

## 2018-01-01 PROCEDURE — A9270 NON-COVERED ITEM OR SERVICE: HCPCS | Mod: GY | Performed by: EMERGENCY MEDICINE

## 2018-01-01 PROCEDURE — 87205 SMEAR GRAM STAIN: CPT | Performed by: ORTHOPAEDIC SURGERY

## 2018-01-01 PROCEDURE — 83550 IRON BINDING TEST: CPT | Performed by: INTERNAL MEDICINE

## 2018-01-01 PROCEDURE — 87077 CULTURE AEROBIC IDENTIFY: CPT | Performed by: INTERNAL MEDICINE

## 2018-01-01 PROCEDURE — 86901 BLOOD TYPING SEROLOGIC RH(D): CPT | Performed by: INTERNAL MEDICINE

## 2018-01-01 PROCEDURE — A9270 NON-COVERED ITEM OR SERVICE: HCPCS | Mod: GY | Performed by: PHYSICIAN ASSISTANT

## 2018-01-01 PROCEDURE — 37000009 ZZH ANESTHESIA TECHNICAL FEE, EACH ADDTL 15 MIN: Performed by: ORTHOPAEDIC SURGERY

## 2018-01-01 PROCEDURE — 96376 TX/PRO/DX INJ SAME DRUG ADON: CPT

## 2018-01-01 PROCEDURE — 99215 OFFICE O/P EST HI 40 MIN: CPT | Performed by: INTERNAL MEDICINE

## 2018-01-01 PROCEDURE — 86850 RBC ANTIBODY SCREEN: CPT | Performed by: EMERGENCY MEDICINE

## 2018-01-01 PROCEDURE — 25000132 ZZH RX MED GY IP 250 OP 250 PS 637: Mod: GY | Performed by: EMERGENCY MEDICINE

## 2018-01-01 PROCEDURE — 82272 OCCULT BLD FECES 1-3 TESTS: CPT | Performed by: INTERNAL MEDICINE

## 2018-01-01 PROCEDURE — 84295 ASSAY OF SERUM SODIUM: CPT | Performed by: HOSPITALIST

## 2018-01-01 PROCEDURE — 85018 HEMOGLOBIN: CPT | Performed by: ORTHOPAEDIC SURGERY

## 2018-01-01 PROCEDURE — 82570 ASSAY OF URINE CREATININE: CPT | Performed by: HOSPITALIST

## 2018-01-01 PROCEDURE — 99207 ZZC CDG-MDM COMPONENT: MEETS LOW - DOWN CODED: CPT | Performed by: NURSE PRACTITIONER

## 2018-01-01 PROCEDURE — 0QB70ZZ EXCISION OF LEFT UPPER FEMUR, OPEN APPROACH: ICD-10-PCS | Performed by: ORTHOPAEDIC SURGERY

## 2018-01-01 PROCEDURE — 85025 COMPLETE CBC W/AUTO DIFF WBC: CPT | Performed by: INTERNAL MEDICINE

## 2018-01-01 PROCEDURE — 84100 ASSAY OF PHOSPHORUS: CPT | Performed by: INTERNAL MEDICINE

## 2018-01-01 PROCEDURE — 99205 OFFICE O/P NEW HI 60 MIN: CPT | Performed by: INTERNAL MEDICINE

## 2018-01-01 PROCEDURE — 99215 OFFICE O/P EST HI 40 MIN: CPT | Mod: AI | Performed by: INTERNAL MEDICINE

## 2018-01-01 PROCEDURE — 87070 CULTURE OTHR SPECIMN AEROBIC: CPT | Performed by: ORTHOPAEDIC SURGERY

## 2018-01-01 PROCEDURE — 84443 ASSAY THYROID STIM HORMONE: CPT | Performed by: INTERNAL MEDICINE

## 2018-01-01 PROCEDURE — 85049 AUTOMATED PLATELET COUNT: CPT | Performed by: HOSPITALIST

## 2018-01-01 PROCEDURE — 97535 SELF CARE MNGMENT TRAINING: CPT | Mod: GO | Performed by: OCCUPATIONAL THERAPY ASSISTANT

## 2018-01-01 PROCEDURE — 25000566 ZZH SEVOFLURANE, EA 15 MIN: Performed by: ORTHOPAEDIC SURGERY

## 2018-01-01 PROCEDURE — 87086 URINE CULTURE/COLONY COUNT: CPT | Performed by: INTERNAL MEDICINE

## 2018-01-01 PROCEDURE — 99495 TRANSJ CARE MGMT MOD F2F 14D: CPT | Performed by: INTERNAL MEDICINE

## 2018-01-01 PROCEDURE — 40000170 ZZH STATISTIC PRE-PROCEDURE ASSESSMENT II: Performed by: ORTHOPAEDIC SURGERY

## 2018-01-01 PROCEDURE — 93280 PM DEVICE PROGR EVAL DUAL: CPT | Performed by: INTERNAL MEDICINE

## 2018-01-01 PROCEDURE — 87186 SC STD MICRODIL/AGAR DIL: CPT | Performed by: EMERGENCY MEDICINE

## 2018-01-01 PROCEDURE — 86901 BLOOD TYPING SEROLOGIC RH(D): CPT | Performed by: EMERGENCY MEDICINE

## 2018-01-01 PROCEDURE — 25000125 ZZHC RX 250: Performed by: EMERGENCY MEDICINE

## 2018-01-01 PROCEDURE — 87077 CULTURE AEROBIC IDENTIFY: CPT | Performed by: ORTHOPAEDIC SURGERY

## 2018-01-01 PROCEDURE — 99310 SBSQ NF CARE HIGH MDM 45: CPT | Performed by: NURSE PRACTITIONER

## 2018-01-01 PROCEDURE — 73502 X-RAY EXAM HIP UNI 2-3 VIEWS: CPT

## 2018-01-01 PROCEDURE — 40000986 XR PELVIS AND HIP PORTABLE LEFT 1 VIEW

## 2018-01-01 PROCEDURE — 36000050 ZZH SURGERY LEVEL 2 1ST 30 MIN: Performed by: ORTHOPAEDIC SURGERY

## 2018-01-01 PROCEDURE — 25000128 H RX IP 250 OP 636

## 2018-01-01 PROCEDURE — 87088 URINE BACTERIA CULTURE: CPT | Performed by: EMERGENCY MEDICINE

## 2018-01-01 PROCEDURE — 99225 ZZC SUBSEQUENT OBSERVATION CARE,LEVEL II: CPT | Performed by: PHYSICIAN ASSISTANT

## 2018-01-01 PROCEDURE — 97165 OT EVAL LOW COMPLEX 30 MIN: CPT | Mod: GO | Performed by: REHABILITATION PRACTITIONER

## 2018-01-01 PROCEDURE — G0463 HOSPITAL OUTPT CLINIC VISIT: HCPCS | Mod: 25

## 2018-01-01 PROCEDURE — 40000894 ZZH STATISTIC OT IP EVAL DEFER: Performed by: OCCUPATIONAL THERAPIST

## 2018-01-01 PROCEDURE — 96375 TX/PRO/DX INJ NEW DRUG ADDON: CPT

## 2018-01-01 PROCEDURE — 87205 SMEAR GRAM STAIN: CPT | Performed by: INTERNAL MEDICINE

## 2018-01-01 PROCEDURE — 99316 NF DSCHRG MGMT 30 MIN+: CPT | Performed by: NURSE PRACTITIONER

## 2018-01-01 PROCEDURE — 86923 COMPATIBILITY TEST ELECTRIC: CPT | Performed by: EMERGENCY MEDICINE

## 2018-01-01 PROCEDURE — 99207 ZZC CDG-MDM COMPONENT: MEETS MODERATE - UP CODED: CPT | Performed by: INTERNAL MEDICINE

## 2018-01-01 PROCEDURE — 82565 ASSAY OF CREATININE: CPT | Performed by: HOSPITALIST

## 2018-01-01 PROCEDURE — 86923 COMPATIBILITY TEST ELECTRIC: CPT | Performed by: INTERNAL MEDICINE

## 2018-01-01 PROCEDURE — 97162 PT EVAL MOD COMPLEX 30 MIN: CPT | Mod: GP

## 2018-01-01 PROCEDURE — 97535 SELF CARE MNGMENT TRAINING: CPT | Mod: GO

## 2018-01-01 PROCEDURE — 96372 THER/PROPH/DIAG INJ SC/IM: CPT | Mod: XS

## 2018-01-01 PROCEDURE — 40000893 ZZH STATISTIC PT IP EVAL DEFER

## 2018-01-01 PROCEDURE — 99217 ZZC OBSERVATION CARE DISCHARGE: CPT | Performed by: PHYSICIAN ASSISTANT

## 2018-01-01 PROCEDURE — 00000402 ZZHCL STATISTIC TOTAL PROTEIN: Performed by: INTERNAL MEDICINE

## 2018-01-01 PROCEDURE — 81001 URINALYSIS AUTO W/SCOPE: CPT | Performed by: INTERNAL MEDICINE

## 2018-01-01 PROCEDURE — 86900 BLOOD TYPING SEROLOGIC ABO: CPT | Performed by: INTERNAL MEDICINE

## 2018-01-01 PROCEDURE — 96374 THER/PROPH/DIAG INJ IV PUSH: CPT | Mod: 59

## 2018-01-01 PROCEDURE — 86850 RBC ANTIBODY SCREEN: CPT | Performed by: INTERNAL MEDICINE

## 2018-01-01 PROCEDURE — 87088 URINE BACTERIA CULTURE: CPT | Performed by: INTERNAL MEDICINE

## 2018-01-01 PROCEDURE — 0JC90ZZ EXTIRPATION OF MATTER FROM BUTTOCK SUBCUTANEOUS TISSUE AND FASCIA, OPEN APPROACH: ICD-10-PCS | Performed by: ORTHOPAEDIC SURGERY

## 2018-01-01 PROCEDURE — 97161 PT EVAL LOW COMPLEX 20 MIN: CPT | Mod: GP

## 2018-01-01 PROCEDURE — 85018 HEMOGLOBIN: CPT | Performed by: INTERNAL MEDICINE

## 2018-01-01 PROCEDURE — 85018 HEMOGLOBIN: CPT | Performed by: ANESTHESIOLOGY

## 2018-01-01 PROCEDURE — 27810169 ZZH OR IMPLANT GENERAL: Performed by: ORTHOPAEDIC SURGERY

## 2018-01-01 PROCEDURE — 97530 THERAPEUTIC ACTIVITIES: CPT | Mod: GO | Performed by: OCCUPATIONAL THERAPY ASSISTANT

## 2018-01-01 PROCEDURE — 86900 BLOOD TYPING SEROLOGIC ABO: CPT | Performed by: EMERGENCY MEDICINE

## 2018-01-01 PROCEDURE — 0H9JXZX DRAINAGE OF LEFT UPPER LEG SKIN, EXTERNAL APPROACH, DIAGNOSTIC: ICD-10-PCS | Performed by: ORTHOPAEDIC SURGERY

## 2018-01-01 DEVICE — IMP CENTRALIZER DISTAL BI POLAR SNN INVIS 11MM 71313211: Type: IMPLANTABLE DEVICE | Site: HIP | Status: FUNCTIONAL

## 2018-01-01 DEVICE — BONE CEMENT RESTRICTOR BUCK FEMORAL 18.5MM 129418: Type: IMPLANTABLE DEVICE | Site: HIP | Status: FUNCTIONAL

## 2018-01-01 DEVICE — IMPLANTABLE DEVICE: Type: IMPLANTABLE DEVICE | Site: HIP | Status: FUNCTIONAL

## 2018-01-01 DEVICE — IMP CUP SNN BIPOLAR TANDEM 46MM: Type: IMPLANTABLE DEVICE | Site: HIP | Status: FUNCTIONAL

## 2018-01-01 DEVICE — IMP HEAD FEMORAL SNR COBALT 28MM +0 71302800: Type: IMPLANTABLE DEVICE | Site: HIP | Status: FUNCTIONAL

## 2018-01-01 DEVICE — BONE CEMENT STRK SIMPLEX P SPEEDSET 6192-1-001: Type: IMPLANTABLE DEVICE | Site: HIP | Status: FUNCTIONAL

## 2018-01-01 RX ORDER — NALOXONE HYDROCHLORIDE 0.4 MG/ML
.1-.4 INJECTION, SOLUTION INTRAMUSCULAR; INTRAVENOUS; SUBCUTANEOUS
Status: DISCONTINUED | OUTPATIENT
Start: 2018-01-01 | End: 2018-01-01 | Stop reason: HOSPADM

## 2018-01-01 RX ORDER — AMOXICILLIN 250 MG
2 CAPSULE ORAL 2 TIMES DAILY
Status: DISCONTINUED | OUTPATIENT
Start: 2018-01-01 | End: 2018-01-01 | Stop reason: HOSPADM

## 2018-01-01 RX ORDER — OXYCODONE HYDROCHLORIDE 5 MG/1
5 TABLET ORAL EVERY 4 HOURS PRN
Qty: 15 TABLET | Refills: 0 | Status: SHIPPED | OUTPATIENT
Start: 2018-01-01 | End: 2019-01-01

## 2018-01-01 RX ORDER — DEXTROSE MONOHYDRATE 25 G/50ML
25-50 INJECTION, SOLUTION INTRAVENOUS
Status: DISCONTINUED | OUTPATIENT
Start: 2018-01-01 | End: 2018-01-01

## 2018-01-01 RX ORDER — BLOOD SUGAR DIAGNOSTIC
STRIP MISCELLANEOUS
Qty: 200 STRIP | Refills: 0 | Status: SHIPPED | OUTPATIENT
Start: 2018-01-01

## 2018-01-01 RX ORDER — LISINOPRIL 10 MG/1
10 TABLET ORAL 2 TIMES DAILY
Status: DISCONTINUED | OUTPATIENT
Start: 2018-01-01 | End: 2018-01-01 | Stop reason: HOSPADM

## 2018-01-01 RX ORDER — ONDANSETRON 2 MG/ML
4 INJECTION INTRAMUSCULAR; INTRAVENOUS EVERY 6 HOURS PRN
Status: DISCONTINUED | OUTPATIENT
Start: 2018-01-01 | End: 2018-01-01 | Stop reason: HOSPADM

## 2018-01-01 RX ORDER — PROCHLORPERAZINE MALEATE 5 MG
5 TABLET ORAL EVERY 6 HOURS PRN
Status: DISCONTINUED | OUTPATIENT
Start: 2018-01-01 | End: 2018-01-01 | Stop reason: HOSPADM

## 2018-01-01 RX ORDER — DOXYCYCLINE 100 MG/1
100 CAPSULE ORAL ONCE
Status: COMPLETED | OUTPATIENT
Start: 2018-01-01 | End: 2018-01-01

## 2018-01-01 RX ORDER — OXYCODONE HYDROCHLORIDE 5 MG/1
5 TABLET ORAL ONCE
Status: COMPLETED | OUTPATIENT
Start: 2018-01-01 | End: 2018-01-01

## 2018-01-01 RX ORDER — CLINDAMYCIN PHOSPHATE 900 MG/50ML
900 INJECTION, SOLUTION INTRAVENOUS EVERY 8 HOURS
Status: DISCONTINUED | OUTPATIENT
Start: 2018-01-01 | End: 2018-01-01

## 2018-01-01 RX ORDER — DEXTROSE MONOHYDRATE 25 G/50ML
25-50 INJECTION, SOLUTION INTRAVENOUS
Status: DISCONTINUED | OUTPATIENT
Start: 2018-01-01 | End: 2018-01-01 | Stop reason: HOSPADM

## 2018-01-01 RX ORDER — ACETAMINOPHEN 325 MG/1
650 TABLET ORAL EVERY 4 HOURS PRN
Status: DISCONTINUED | OUTPATIENT
Start: 2018-01-01 | End: 2018-01-01

## 2018-01-01 RX ORDER — ONDANSETRON 4 MG/1
4 TABLET, ORALLY DISINTEGRATING ORAL EVERY 6 HOURS PRN
Status: DISCONTINUED | OUTPATIENT
Start: 2018-01-01 | End: 2018-01-01

## 2018-01-01 RX ORDER — METOPROLOL TARTRATE 25 MG/1
25 TABLET, FILM COATED ORAL 2 TIMES DAILY
Status: DISCONTINUED | OUTPATIENT
Start: 2018-01-01 | End: 2018-01-01 | Stop reason: HOSPADM

## 2018-01-01 RX ORDER — HYDROMORPHONE HYDROCHLORIDE 1 MG/ML
INJECTION, SOLUTION INTRAMUSCULAR; INTRAVENOUS; SUBCUTANEOUS
Status: COMPLETED
Start: 2018-01-01 | End: 2018-01-01

## 2018-01-01 RX ORDER — SODIUM CHLORIDE, SODIUM LACTATE, POTASSIUM CHLORIDE, CALCIUM CHLORIDE 600; 310; 30; 20 MG/100ML; MG/100ML; MG/100ML; MG/100ML
INJECTION, SOLUTION INTRAVENOUS CONTINUOUS PRN
Status: DISCONTINUED | OUTPATIENT
Start: 2018-01-01 | End: 2018-01-01

## 2018-01-01 RX ORDER — HYDROMORPHONE HYDROCHLORIDE 1 MG/ML
0.2 INJECTION, SOLUTION INTRAMUSCULAR; INTRAVENOUS; SUBCUTANEOUS
Status: DISCONTINUED | OUTPATIENT
Start: 2018-01-01 | End: 2018-01-01 | Stop reason: HOSPADM

## 2018-01-01 RX ORDER — ONDANSETRON 2 MG/ML
INJECTION INTRAMUSCULAR; INTRAVENOUS PRN
Status: DISCONTINUED | OUTPATIENT
Start: 2018-01-01 | End: 2018-01-01

## 2018-01-01 RX ORDER — ASPIRIN 81 MG/1
TABLET, CHEWABLE ORAL
Status: DISCONTINUED
Start: 2018-01-01 | End: 2018-01-01

## 2018-01-01 RX ORDER — HYDROXYZINE PAMOATE 25 MG/1
25 CAPSULE ORAL 2 TIMES DAILY PRN
Status: DISCONTINUED | OUTPATIENT
Start: 2018-01-01 | End: 2018-01-01 | Stop reason: HOSPADM

## 2018-01-01 RX ORDER — AMOXICILLIN 500 MG/1
500 CAPSULE ORAL EVERY 12 HOURS SCHEDULED
Status: DISCONTINUED | OUTPATIENT
Start: 2018-01-01 | End: 2018-01-01 | Stop reason: HOSPADM

## 2018-01-01 RX ORDER — BISACODYL 5 MG
10 TABLET, DELAYED RELEASE (ENTERIC COATED) ORAL DAILY PRN
Status: DISCONTINUED | OUTPATIENT
Start: 2018-01-01 | End: 2018-01-01

## 2018-01-01 RX ORDER — FLUTICASONE PROPIONATE 50 MCG
1-2 SPRAY, SUSPENSION (ML) NASAL DAILY PRN
Status: DISCONTINUED | OUTPATIENT
Start: 2018-01-01 | End: 2018-01-01 | Stop reason: HOSPADM

## 2018-01-01 RX ORDER — MEPERIDINE HYDROCHLORIDE 25 MG/ML
12.5 INJECTION INTRAMUSCULAR; INTRAVENOUS; SUBCUTANEOUS EVERY 5 MIN PRN
Status: DISCONTINUED | OUTPATIENT
Start: 2018-01-01 | End: 2018-01-01 | Stop reason: HOSPADM

## 2018-01-01 RX ORDER — FUROSEMIDE 10 MG/ML
20 INJECTION INTRAMUSCULAR; INTRAVENOUS ONCE
Status: COMPLETED | OUTPATIENT
Start: 2018-01-01 | End: 2018-01-01

## 2018-01-01 RX ORDER — MIDODRINE HYDROCHLORIDE 5 MG/1
5 TABLET ORAL ONCE
Status: COMPLETED | OUTPATIENT
Start: 2018-01-01 | End: 2018-01-01

## 2018-01-01 RX ORDER — PROCHLORPERAZINE 25 MG
12.5 SUPPOSITORY, RECTAL RECTAL EVERY 12 HOURS PRN
Status: DISCONTINUED | OUTPATIENT
Start: 2018-01-01 | End: 2018-01-01 | Stop reason: HOSPADM

## 2018-01-01 RX ORDER — LISINOPRIL 10 MG/1
10 TABLET ORAL DAILY
Start: 2018-01-01 | End: 2018-01-01

## 2018-01-01 RX ORDER — ONDANSETRON 4 MG/1
4 TABLET, ORALLY DISINTEGRATING ORAL EVERY 30 MIN PRN
Status: DISCONTINUED | OUTPATIENT
Start: 2018-01-01 | End: 2018-01-01 | Stop reason: HOSPADM

## 2018-01-01 RX ORDER — APIXABAN 2.5 MG/1
2.5 TABLET, FILM COATED ORAL 2 TIMES DAILY
COMMUNITY
Start: 2018-01-01 | End: 2018-01-01

## 2018-01-01 RX ORDER — GUAIFENESIN 600 MG/1
600 TABLET, EXTENDED RELEASE ORAL 2 TIMES DAILY
Status: DISCONTINUED | OUTPATIENT
Start: 2018-01-01 | End: 2018-01-01 | Stop reason: HOSPADM

## 2018-01-01 RX ORDER — GLYCOPYRROLATE 0.2 MG/ML
INJECTION, SOLUTION INTRAMUSCULAR; INTRAVENOUS PRN
Status: DISCONTINUED | OUTPATIENT
Start: 2018-01-01 | End: 2018-01-01

## 2018-01-01 RX ORDER — ONDANSETRON 2 MG/ML
4 INJECTION INTRAMUSCULAR; INTRAVENOUS EVERY 30 MIN PRN
Status: DISCONTINUED | OUTPATIENT
Start: 2018-01-01 | End: 2018-01-01 | Stop reason: HOSPADM

## 2018-01-01 RX ORDER — TIOTROPIUM BROMIDE 18 UG/1
18 CAPSULE ORAL; RESPIRATORY (INHALATION) EVERY EVENING
Status: DISCONTINUED | OUTPATIENT
Start: 2018-01-01 | End: 2018-01-01 | Stop reason: CLARIF

## 2018-01-01 RX ORDER — HEPARIN SODIUM 5000 [USP'U]/.5ML
5000 INJECTION, SOLUTION INTRAVENOUS; SUBCUTANEOUS EVERY 12 HOURS
Status: DISCONTINUED | OUTPATIENT
Start: 2018-01-01 | End: 2018-01-01

## 2018-01-01 RX ORDER — AMOXICILLIN 250 MG
2 CAPSULE ORAL 2 TIMES DAILY PRN
Status: DISCONTINUED | OUTPATIENT
Start: 2018-01-01 | End: 2018-01-01 | Stop reason: HOSPADM

## 2018-01-01 RX ORDER — TORSEMIDE 20 MG/1
20 TABLET ORAL DAILY
Qty: 30 TABLET | Refills: 1 | Status: SHIPPED | OUTPATIENT
Start: 2018-01-01 | End: 2019-01-01

## 2018-01-01 RX ORDER — NICOTINE POLACRILEX 4 MG
15-30 LOZENGE BUCCAL
Status: DISCONTINUED | OUTPATIENT
Start: 2018-01-01 | End: 2018-01-01

## 2018-01-01 RX ORDER — LORAZEPAM 0.5 MG/1
0.5 TABLET ORAL ONCE
Status: COMPLETED | OUTPATIENT
Start: 2018-01-01 | End: 2018-01-01

## 2018-01-01 RX ORDER — POLYETHYLENE GLYCOL 3350 17 G/17G
17 POWDER, FOR SOLUTION ORAL 2 TIMES DAILY
Qty: 7 PACKET | Status: ON HOLD | DISCHARGE
Start: 2018-01-01 | End: 2018-01-01

## 2018-01-01 RX ORDER — ONDANSETRON 4 MG/1
4 TABLET, ORALLY DISINTEGRATING ORAL EVERY 6 HOURS PRN
Status: DISCONTINUED | OUTPATIENT
Start: 2018-01-01 | End: 2018-01-01 | Stop reason: HOSPADM

## 2018-01-01 RX ORDER — HYDROMORPHONE HYDROCHLORIDE 1 MG/ML
0.5 INJECTION, SOLUTION INTRAMUSCULAR; INTRAVENOUS; SUBCUTANEOUS
Status: COMPLETED | OUTPATIENT
Start: 2018-01-01 | End: 2018-01-01

## 2018-01-01 RX ORDER — PREGABALIN 75 MG/1
75 CAPSULE ORAL 2 TIMES DAILY
Status: DISCONTINUED | OUTPATIENT
Start: 2018-01-01 | End: 2018-01-01

## 2018-01-01 RX ORDER — PREGABALIN 75 MG/1
75 CAPSULE ORAL 2 TIMES DAILY
Qty: 60 CAPSULE | Refills: 0 | Status: SHIPPED | OUTPATIENT
Start: 2018-01-01 | End: 2018-01-01

## 2018-01-01 RX ORDER — PROPOFOL 10 MG/ML
INJECTION, EMULSION INTRAVENOUS PRN
Status: DISCONTINUED | OUTPATIENT
Start: 2018-01-01 | End: 2018-01-01

## 2018-01-01 RX ORDER — ALBUTEROL SULFATE 0.83 MG/ML
2.5 SOLUTION RESPIRATORY (INHALATION) EVERY 4 HOURS PRN
Status: DISCONTINUED | OUTPATIENT
Start: 2018-01-01 | End: 2018-01-01 | Stop reason: HOSPADM

## 2018-01-01 RX ORDER — BISACODYL 5 MG/1
5-10 TABLET, DELAYED RELEASE ORAL DAILY PRN
COMMUNITY
End: 2019-01-01

## 2018-01-01 RX ORDER — IPRATROPIUM BROMIDE AND ALBUTEROL SULFATE 2.5; .5 MG/3ML; MG/3ML
3 SOLUTION RESPIRATORY (INHALATION)
Status: DISCONTINUED | OUTPATIENT
Start: 2018-01-01 | End: 2018-01-01

## 2018-01-01 RX ORDER — AMOXICILLIN 250 MG
1 CAPSULE ORAL 2 TIMES DAILY PRN
Status: DISCONTINUED | OUTPATIENT
Start: 2018-01-01 | End: 2018-01-01 | Stop reason: HOSPADM

## 2018-01-01 RX ORDER — AMOXICILLIN 250 MG
2 CAPSULE ORAL 2 TIMES DAILY
Status: DISCONTINUED | OUTPATIENT
Start: 2018-01-01 | End: 2018-01-01

## 2018-01-01 RX ORDER — ACETAMINOPHEN 650 MG/1
650 SUPPOSITORY RECTAL EVERY 4 HOURS PRN
Status: DISCONTINUED | OUTPATIENT
Start: 2018-01-01 | End: 2018-01-01 | Stop reason: HOSPADM

## 2018-01-01 RX ORDER — PREGABALIN 75 MG/1
75 CAPSULE ORAL 2 TIMES DAILY
Qty: 60 CAPSULE | Refills: 3 | Status: SHIPPED | OUTPATIENT
Start: 2018-01-01 | End: 2018-01-01

## 2018-01-01 RX ORDER — ASPIRIN 81 MG/1
81 TABLET ORAL DAILY
Status: DISCONTINUED | OUTPATIENT
Start: 2018-01-01 | End: 2018-01-01 | Stop reason: HOSPADM

## 2018-01-01 RX ORDER — HYDROMORPHONE HYDROCHLORIDE 1 MG/ML
INJECTION, SOLUTION INTRAMUSCULAR; INTRAVENOUS; SUBCUTANEOUS
Status: DISPENSED
Start: 2018-01-01 | End: 2018-01-01

## 2018-01-01 RX ORDER — NALOXONE HYDROCHLORIDE 0.4 MG/ML
.1-.4 INJECTION, SOLUTION INTRAMUSCULAR; INTRAVENOUS; SUBCUTANEOUS
Status: DISCONTINUED | OUTPATIENT
Start: 2018-01-01 | End: 2018-01-01

## 2018-01-01 RX ORDER — SODIUM CHLORIDE, SODIUM LACTATE, POTASSIUM CHLORIDE, CALCIUM CHLORIDE 600; 310; 30; 20 MG/100ML; MG/100ML; MG/100ML; MG/100ML
INJECTION, SOLUTION INTRAVENOUS CONTINUOUS
Status: DISCONTINUED | OUTPATIENT
Start: 2018-01-01 | End: 2018-01-01 | Stop reason: HOSPADM

## 2018-01-01 RX ORDER — SODIUM CHLORIDE 9 MG/ML
INJECTION, SOLUTION INTRAVENOUS CONTINUOUS
Status: DISCONTINUED | OUTPATIENT
Start: 2018-01-01 | End: 2018-01-01 | Stop reason: ALTCHOICE

## 2018-01-01 RX ORDER — FENTANYL CITRATE 50 UG/ML
INJECTION, SOLUTION INTRAMUSCULAR; INTRAVENOUS PRN
Status: DISCONTINUED | OUTPATIENT
Start: 2018-01-01 | End: 2018-01-01

## 2018-01-01 RX ORDER — SODIUM CHLORIDE, SODIUM LACTATE, POTASSIUM CHLORIDE, CALCIUM CHLORIDE 600; 310; 30; 20 MG/100ML; MG/100ML; MG/100ML; MG/100ML
INJECTION, SOLUTION INTRAVENOUS CONTINUOUS
Status: DISCONTINUED | OUTPATIENT
Start: 2018-01-01 | End: 2018-01-01

## 2018-01-01 RX ORDER — HYDROMORPHONE HYDROCHLORIDE 1 MG/ML
.3-.5 INJECTION, SOLUTION INTRAMUSCULAR; INTRAVENOUS; SUBCUTANEOUS EVERY 5 MIN PRN
Status: DISCONTINUED | OUTPATIENT
Start: 2018-01-01 | End: 2018-01-01 | Stop reason: HOSPADM

## 2018-01-01 RX ORDER — ASPIRIN 81 MG/1
81 TABLET ORAL DAILY
Status: DISCONTINUED | OUTPATIENT
Start: 2018-01-01 | End: 2018-01-01

## 2018-01-01 RX ORDER — CEFTRIAXONE 1 G/1
1 INJECTION, POWDER, FOR SOLUTION INTRAMUSCULAR; INTRAVENOUS EVERY 24 HOURS
Status: DISCONTINUED | OUTPATIENT
Start: 2018-01-01 | End: 2018-01-01

## 2018-01-01 RX ORDER — NYSTATIN 100000/ML
100000 SUSPENSION, ORAL (FINAL DOSE FORM) ORAL 4 TIMES DAILY
Status: DISCONTINUED | OUTPATIENT
Start: 2018-01-01 | End: 2018-01-01 | Stop reason: HOSPADM

## 2018-01-01 RX ORDER — ACETAMINOPHEN 325 MG/1
650 TABLET ORAL EVERY 4 HOURS PRN
Status: DISCONTINUED | OUTPATIENT
Start: 2018-01-01 | End: 2018-01-01 | Stop reason: HOSPADM

## 2018-01-01 RX ORDER — CLINDAMYCIN PHOSPHATE 900 MG/50ML
900 INJECTION, SOLUTION INTRAVENOUS EVERY 8 HOURS
Status: COMPLETED | OUTPATIENT
Start: 2018-01-01 | End: 2018-01-01

## 2018-01-01 RX ORDER — ALBUMIN (HUMAN) 12.5 G/50ML
25 SOLUTION INTRAVENOUS EVERY 8 HOURS
Status: DISCONTINUED | OUTPATIENT
Start: 2018-01-01 | End: 2018-01-01

## 2018-01-01 RX ORDER — FLUTICASONE PROPIONATE 50 MCG
2 SPRAY, SUSPENSION (ML) NASAL DAILY
Status: DISCONTINUED | OUTPATIENT
Start: 2018-01-01 | End: 2018-01-01 | Stop reason: DRUGHIGH

## 2018-01-01 RX ORDER — ONDANSETRON 2 MG/ML
4 INJECTION INTRAMUSCULAR; INTRAVENOUS ONCE
Status: COMPLETED | OUTPATIENT
Start: 2018-01-01 | End: 2018-01-01

## 2018-01-01 RX ORDER — LIDOCAINE 40 MG/G
CREAM TOPICAL
Status: DISCONTINUED | OUTPATIENT
Start: 2018-01-01 | End: 2018-01-01

## 2018-01-01 RX ORDER — CLINDAMYCIN PHOSPHATE 900 MG/50ML
900 INJECTION, SOLUTION INTRAVENOUS SEE ADMIN INSTRUCTIONS
Status: DISCONTINUED | OUTPATIENT
Start: 2018-01-01 | End: 2018-01-01 | Stop reason: HOSPADM

## 2018-01-01 RX ORDER — PREGABALIN 100 MG
CAPSULE ORAL
Status: ON HOLD | COMMUNITY
Start: 2018-01-01 | End: 2018-01-01

## 2018-01-01 RX ORDER — VANCOMYCIN HYDROCHLORIDE 1 G/20ML
INJECTION, POWDER, LYOPHILIZED, FOR SOLUTION INTRAVENOUS PRN
Status: DISCONTINUED | OUTPATIENT
Start: 2018-01-01 | End: 2018-01-01 | Stop reason: HOSPADM

## 2018-01-01 RX ORDER — SODIUM CHLORIDE 9 MG/ML
INJECTION, SOLUTION INTRAVENOUS CONTINUOUS
Status: ACTIVE | OUTPATIENT
Start: 2018-01-01 | End: 2018-01-01

## 2018-01-01 RX ORDER — MINOCYCLINE HYDROCHLORIDE 100 MG/1
100 CAPSULE ORAL EVERY 12 HOURS SCHEDULED
Status: DISCONTINUED | OUTPATIENT
Start: 2018-01-01 | End: 2018-01-01 | Stop reason: HOSPADM

## 2018-01-01 RX ORDER — GLIPIZIDE 5 MG/1
5 TABLET ORAL
Qty: 90 TABLET | Refills: 0 | Status: SHIPPED | OUTPATIENT
Start: 2018-01-01 | End: 2019-01-01

## 2018-01-01 RX ORDER — NALOXONE HYDROCHLORIDE 0.4 MG/ML
.1-.4 INJECTION, SOLUTION INTRAMUSCULAR; INTRAVENOUS; SUBCUTANEOUS
Status: ACTIVE | OUTPATIENT
Start: 2018-01-01 | End: 2018-01-01

## 2018-01-01 RX ORDER — HYDRALAZINE HYDROCHLORIDE 20 MG/ML
10 INJECTION INTRAMUSCULAR; INTRAVENOUS EVERY 4 HOURS PRN
Status: DISCONTINUED | OUTPATIENT
Start: 2018-01-01 | End: 2018-01-01 | Stop reason: HOSPADM

## 2018-01-01 RX ORDER — POTASSIUM CHLORIDE 1.5 G/1.58G
20 POWDER, FOR SOLUTION ORAL DAILY
Status: DISCONTINUED | OUTPATIENT
Start: 2018-01-01 | End: 2018-01-01 | Stop reason: HOSPADM

## 2018-01-01 RX ORDER — CEFTRIAXONE 2 G/1
2 INJECTION, POWDER, FOR SOLUTION INTRAMUSCULAR; INTRAVENOUS EVERY 24 HOURS
Status: DISCONTINUED | OUTPATIENT
Start: 2018-01-01 | End: 2018-01-01

## 2018-01-01 RX ORDER — FENTANYL CITRATE 50 UG/ML
25-50 INJECTION, SOLUTION INTRAMUSCULAR; INTRAVENOUS
Status: DISCONTINUED | OUTPATIENT
Start: 2018-01-01 | End: 2018-01-01 | Stop reason: HOSPADM

## 2018-01-01 RX ORDER — SODIUM CHLORIDE 9 MG/ML
INJECTION, SOLUTION INTRAVENOUS CONTINUOUS
Status: DISCONTINUED | OUTPATIENT
Start: 2018-01-01 | End: 2018-01-01

## 2018-01-01 RX ORDER — NYSTATIN 100000/ML
500000 SUSPENSION, ORAL (FINAL DOSE FORM) ORAL 4 TIMES DAILY
Status: DISCONTINUED | OUTPATIENT
Start: 2018-01-01 | End: 2018-01-01

## 2018-01-01 RX ORDER — NICOTINE POLACRILEX 4 MG
15-30 LOZENGE BUCCAL
Status: DISCONTINUED | OUTPATIENT
Start: 2018-01-01 | End: 2018-01-01 | Stop reason: HOSPADM

## 2018-01-01 RX ORDER — ALBUTEROL SULFATE 0.83 MG/ML
2.5 SOLUTION RESPIRATORY (INHALATION)
Status: DISCONTINUED | OUTPATIENT
Start: 2018-01-01 | End: 2018-01-01 | Stop reason: HOSPADM

## 2018-01-01 RX ORDER — PREGABALIN 75 MG/1
75 CAPSULE ORAL 2 TIMES DAILY
Qty: 60 CAPSULE | Refills: 0 | Status: SHIPPED | OUTPATIENT
Start: 2018-01-01 | End: 2019-01-01

## 2018-01-01 RX ORDER — BISACODYL 5 MG/1
10 TABLET, DELAYED RELEASE ORAL EVERY EVENING
Status: ON HOLD | COMMUNITY
End: 2018-01-01

## 2018-01-01 RX ORDER — LIDOCAINE 40 MG/G
CREAM TOPICAL
Status: DISCONTINUED | OUTPATIENT
Start: 2018-01-01 | End: 2018-01-01 | Stop reason: HOSPADM

## 2018-01-01 RX ORDER — ACETAMINOPHEN 500 MG
1000 TABLET ORAL 3 TIMES DAILY
COMMUNITY
End: 2019-01-01

## 2018-01-01 RX ORDER — ACETAMINOPHEN 325 MG/1
650 TABLET ORAL EVERY 6 HOURS PRN
Qty: 40 TABLET | Refills: 0 | Status: SHIPPED | OUTPATIENT
Start: 2018-01-01 | End: 2018-01-01

## 2018-01-01 RX ORDER — ALBUTEROL SULFATE 90 UG/1
2 AEROSOL, METERED RESPIRATORY (INHALATION) EVERY 4 HOURS PRN
Status: DISCONTINUED | OUTPATIENT
Start: 2018-01-01 | End: 2018-01-01 | Stop reason: HOSPADM

## 2018-01-01 RX ORDER — NEOSTIGMINE METHYLSULFATE 1 MG/ML
VIAL (ML) INJECTION PRN
Status: DISCONTINUED | OUTPATIENT
Start: 2018-01-01 | End: 2018-01-01

## 2018-01-01 RX ORDER — DEXAMETHASONE SODIUM PHOSPHATE 4 MG/ML
INJECTION, SOLUTION INTRA-ARTICULAR; INTRALESIONAL; INTRAMUSCULAR; INTRAVENOUS; SOFT TISSUE PRN
Status: DISCONTINUED | OUTPATIENT
Start: 2018-01-01 | End: 2018-01-01

## 2018-01-01 RX ORDER — CEFUROXIME AXETIL 500 MG/1
500 TABLET ORAL EVERY 12 HOURS
Refills: 0 | DISCHARGE
Start: 2018-01-01 | End: 2019-01-01

## 2018-01-01 RX ORDER — TIOTROPIUM BROMIDE 18 UG/1
18 CAPSULE ORAL; RESPIRATORY (INHALATION) EVERY EVENING
Status: DISCONTINUED | OUTPATIENT
Start: 2018-01-01 | End: 2018-01-01 | Stop reason: HOSPADM

## 2018-01-01 RX ORDER — TRAMADOL HYDROCHLORIDE 50 MG/1
50 TABLET ORAL EVERY 6 HOURS PRN
Status: DISCONTINUED | OUTPATIENT
Start: 2018-01-01 | End: 2018-01-01 | Stop reason: HOSPADM

## 2018-01-01 RX ORDER — AMOXICILLIN 250 MG
1 CAPSULE ORAL 2 TIMES DAILY
Status: DISCONTINUED | OUTPATIENT
Start: 2018-01-01 | End: 2018-01-01

## 2018-01-01 RX ORDER — METOCLOPRAMIDE HYDROCHLORIDE 5 MG/ML
5 INJECTION INTRAMUSCULAR; INTRAVENOUS ONCE
Status: COMPLETED | OUTPATIENT
Start: 2018-01-01 | End: 2018-01-01

## 2018-01-01 RX ORDER — GUAIFENESIN 600 MG/1
600 TABLET, EXTENDED RELEASE ORAL 2 TIMES DAILY
Qty: 28 TABLET | Refills: 0 | Status: SHIPPED | OUTPATIENT
Start: 2018-01-01 | End: 2018-01-01

## 2018-01-01 RX ORDER — GLIPIZIDE 5 MG/1
5 TABLET ORAL
Status: DISCONTINUED | OUTPATIENT
Start: 2018-01-01 | End: 2018-01-01 | Stop reason: HOSPADM

## 2018-01-01 RX ORDER — ASPIRIN 81 MG/1
81 TABLET, CHEWABLE ORAL DAILY
Status: DISCONTINUED | OUTPATIENT
Start: 2018-01-01 | End: 2018-01-01

## 2018-01-01 RX ORDER — MIDODRINE HYDROCHLORIDE 2.5 MG/1
2.5 TABLET ORAL ONCE
Status: COMPLETED | OUTPATIENT
Start: 2018-01-01 | End: 2018-01-01

## 2018-01-01 RX ORDER — PREGABALIN 25 MG/1
25 CAPSULE ORAL DAILY
Status: DISCONTINUED | OUTPATIENT
Start: 2018-01-01 | End: 2018-01-01

## 2018-01-01 RX ORDER — LIDOCAINE HYDROCHLORIDE 20 MG/ML
INJECTION, SOLUTION INFILTRATION; PERINEURAL PRN
Status: DISCONTINUED | OUTPATIENT
Start: 2018-01-01 | End: 2018-01-01

## 2018-01-01 RX ORDER — PREGABALIN 25 MG/1
75 CAPSULE ORAL 2 TIMES DAILY
Status: DISCONTINUED | OUTPATIENT
Start: 2018-01-01 | End: 2018-01-01 | Stop reason: HOSPADM

## 2018-01-01 RX ORDER — METOCLOPRAMIDE 5 MG/1
5 TABLET ORAL 3 TIMES DAILY PRN
Status: DISCONTINUED | OUTPATIENT
Start: 2018-01-01 | End: 2018-01-01 | Stop reason: HOSPADM

## 2018-01-01 RX ORDER — TRAMADOL HYDROCHLORIDE 50 MG/1
50 TABLET ORAL EVERY 6 HOURS PRN
Qty: 60 TABLET | Refills: 1 | Status: ON HOLD | OUTPATIENT
Start: 2018-01-01 | End: 2018-01-01

## 2018-01-01 RX ORDER — OXYCODONE HYDROCHLORIDE 5 MG/1
5 TABLET ORAL EVERY 4 HOURS PRN
Status: DISCONTINUED | OUTPATIENT
Start: 2018-01-01 | End: 2018-01-01 | Stop reason: DRUGHIGH

## 2018-01-01 RX ORDER — SUCRALFATE ORAL 1 G/10ML
1 SUSPENSION ORAL 4 TIMES DAILY
Qty: 420 ML | Refills: 1 | Status: SHIPPED | OUTPATIENT
Start: 2018-01-01 | End: 2018-01-01

## 2018-01-01 RX ORDER — FUROSEMIDE 20 MG
20 TABLET ORAL DAILY
Refills: 3 | COMMUNITY
Start: 2018-01-01 | End: 2018-01-01

## 2018-01-01 RX ORDER — FLUTICASONE PROPIONATE 50 MCG
1-2 SPRAY, SUSPENSION (ML) NASAL DAILY PRN
COMMUNITY
End: 2019-01-01

## 2018-01-01 RX ORDER — CLINDAMYCIN HCL 300 MG
300 CAPSULE ORAL 3 TIMES DAILY
Status: DISCONTINUED | OUTPATIENT
Start: 2018-01-01 | End: 2018-01-01

## 2018-01-01 RX ORDER — LABETALOL HYDROCHLORIDE 5 MG/ML
10 INJECTION, SOLUTION INTRAVENOUS
Status: DISCONTINUED | OUTPATIENT
Start: 2018-01-01 | End: 2018-01-01 | Stop reason: HOSPADM

## 2018-01-01 RX ORDER — OXYCODONE HYDROCHLORIDE 5 MG/1
5 TABLET ORAL
Status: DISCONTINUED | OUTPATIENT
Start: 2018-01-01 | End: 2018-01-01 | Stop reason: HOSPADM

## 2018-01-01 RX ORDER — METOPROLOL TARTRATE 25 MG/1
25 TABLET, FILM COATED ORAL 2 TIMES DAILY
Status: DISCONTINUED | OUTPATIENT
Start: 2018-01-01 | End: 2018-01-01

## 2018-01-01 RX ORDER — POLYETHYLENE GLYCOL 3350 17 G/17G
17 POWDER, FOR SOLUTION ORAL 2 TIMES DAILY
Status: DISCONTINUED | OUTPATIENT
Start: 2018-01-01 | End: 2018-01-01 | Stop reason: HOSPADM

## 2018-01-01 RX ORDER — LEVOFLOXACIN 500 MG/1
500 TABLET, FILM COATED ORAL DAILY
Status: DISCONTINUED | OUTPATIENT
Start: 2018-01-01 | End: 2018-01-01

## 2018-01-01 RX ORDER — METOCLOPRAMIDE 5 MG/1
5 TABLET ORAL 3 TIMES DAILY PRN
Qty: 20 TABLET | Refills: 0 | Status: ON HOLD | OUTPATIENT
Start: 2018-01-01 | End: 2018-01-01

## 2018-01-01 RX ORDER — TIOTROPIUM BROMIDE 18 UG/1
18 CAPSULE ORAL; RESPIRATORY (INHALATION) EVERY EVENING
Qty: 30 CAPSULE | Refills: 11 | Status: SHIPPED | OUTPATIENT
Start: 2018-01-01 | End: 2019-01-01

## 2018-01-01 RX ORDER — TRANEXAMIC ACID 100 MG/ML
500 INJECTION, SOLUTION INTRAVENOUS ONCE
Status: COMPLETED | OUTPATIENT
Start: 2018-01-01 | End: 2018-01-01

## 2018-01-01 RX ORDER — ONDANSETRON 2 MG/ML
4 INJECTION INTRAMUSCULAR; INTRAVENOUS EVERY 6 HOURS PRN
Status: DISCONTINUED | OUTPATIENT
Start: 2018-01-01 | End: 2018-01-01

## 2018-01-01 RX ORDER — SIMVASTATIN 10 MG
10 TABLET ORAL AT BEDTIME
Status: DISCONTINUED | OUTPATIENT
Start: 2018-01-01 | End: 2018-01-01 | Stop reason: HOSPADM

## 2018-01-01 RX ORDER — ACETAMINOPHEN 325 MG/1
975 TABLET ORAL EVERY 8 HOURS
Status: DISPENSED | OUTPATIENT
Start: 2018-01-01 | End: 2018-01-01

## 2018-01-01 RX ORDER — LIDOCAINE 50 MG/G
PATCH TOPICAL
Qty: 60 PATCH | Refills: 1 | Status: ON HOLD | OUTPATIENT
Start: 2018-01-01 | End: 2018-01-01

## 2018-01-01 RX ORDER — OXYCODONE HYDROCHLORIDE 5 MG/1
5 TABLET ORAL EVERY 4 HOURS PRN
Qty: 30 TABLET | Refills: 0 | Status: ON HOLD | OUTPATIENT
Start: 2018-01-01 | End: 2018-01-01

## 2018-01-01 RX ORDER — CEFUROXIME AXETIL 500 MG/1
500 TABLET ORAL EVERY 12 HOURS SCHEDULED
Status: DISCONTINUED | OUTPATIENT
Start: 2018-01-01 | End: 2018-01-01 | Stop reason: HOSPADM

## 2018-01-01 RX ORDER — POLYETHYLENE GLYCOL 3350 17 G/17G
17 POWDER, FOR SOLUTION ORAL DAILY
Status: DISCONTINUED | OUTPATIENT
Start: 2018-01-01 | End: 2018-01-01

## 2018-01-01 RX ORDER — FERROUS SULFATE 325(65) MG
325 TABLET ORAL EVERY OTHER DAY
Status: DISCONTINUED | OUTPATIENT
Start: 2018-01-01 | End: 2018-01-01 | Stop reason: HOSPADM

## 2018-01-01 RX ORDER — PIPERACILLIN SODIUM, TAZOBACTAM SODIUM 2; .25 G/10ML; G/10ML
2.25 INJECTION, POWDER, LYOPHILIZED, FOR SOLUTION INTRAVENOUS EVERY 6 HOURS
Status: DISCONTINUED | OUTPATIENT
Start: 2018-01-01 | End: 2018-01-01

## 2018-01-01 RX ORDER — MINOCYCLINE HYDROCHLORIDE 100 MG/1
100 CAPSULE ORAL EVERY 12 HOURS
Qty: 28 CAPSULE | Refills: 0 | DISCHARGE
Start: 2018-01-01 | End: 2019-01-01

## 2018-01-01 RX ORDER — BISACODYL 10 MG
10 SUPPOSITORY, RECTAL RECTAL DAILY PRN
Status: DISCONTINUED | OUTPATIENT
Start: 2018-01-01 | End: 2018-01-01 | Stop reason: HOSPADM

## 2018-01-01 RX ORDER — TORSEMIDE 20 MG/1
20 TABLET ORAL DAILY
Status: DISCONTINUED | OUTPATIENT
Start: 2018-01-01 | End: 2018-01-01 | Stop reason: HOSPADM

## 2018-01-01 RX ORDER — POLYETHYLENE GLYCOL 3350 17 G/17G
17 POWDER, FOR SOLUTION ORAL 2 TIMES DAILY
Status: DISCONTINUED | OUTPATIENT
Start: 2018-01-01 | End: 2018-01-01

## 2018-01-01 RX ORDER — OXYCODONE HYDROCHLORIDE 5 MG/1
5-10 TABLET ORAL
Status: DISCONTINUED | OUTPATIENT
Start: 2018-01-01 | End: 2018-01-01 | Stop reason: DRUGHIGH

## 2018-01-01 RX ORDER — HYDROXYZINE HYDROCHLORIDE 25 MG/1
25 TABLET, FILM COATED ORAL 2 TIMES DAILY PRN
Status: DISCONTINUED | OUTPATIENT
Start: 2018-01-01 | End: 2018-01-01 | Stop reason: HOSPADM

## 2018-01-01 RX ORDER — CLINDAMYCIN HCL 300 MG
300 CAPSULE ORAL 4 TIMES DAILY
Refills: 0 | DISCHARGE
Start: 2018-01-01 | End: 2019-01-01

## 2018-01-01 RX ORDER — CLINDAMYCIN PHOSPHATE 900 MG/50ML
900 INJECTION, SOLUTION INTRAVENOUS
Status: COMPLETED | OUTPATIENT
Start: 2018-01-01 | End: 2018-01-01

## 2018-01-01 RX ORDER — LEVOFLOXACIN 250 MG/1
250 TABLET, FILM COATED ORAL DAILY
Qty: 4 TABLET | Refills: 0 | Status: SHIPPED | OUTPATIENT
Start: 2018-01-01 | End: 2018-01-01

## 2018-01-01 RX ORDER — HYDROMORPHONE HYDROCHLORIDE 1 MG/ML
.3-.5 INJECTION, SOLUTION INTRAMUSCULAR; INTRAVENOUS; SUBCUTANEOUS
Status: DISCONTINUED | OUTPATIENT
Start: 2018-01-01 | End: 2018-01-01 | Stop reason: HOSPADM

## 2018-01-01 RX ORDER — IOPAMIDOL 755 MG/ML
85 INJECTION, SOLUTION INTRAVASCULAR ONCE
Status: COMPLETED | OUTPATIENT
Start: 2018-01-01 | End: 2018-01-01

## 2018-01-01 RX ORDER — SUCRALFATE ORAL 1 G/10ML
1 SUSPENSION ORAL 4 TIMES DAILY
Status: DISCONTINUED | OUTPATIENT
Start: 2018-01-01 | End: 2018-01-01 | Stop reason: HOSPADM

## 2018-01-01 RX ORDER — FENTANYL CITRATE 50 UG/ML
25-100 INJECTION, SOLUTION INTRAMUSCULAR; INTRAVENOUS
Status: DISCONTINUED | OUTPATIENT
Start: 2018-01-01 | End: 2018-01-01 | Stop reason: HOSPADM

## 2018-01-01 RX ORDER — AMOXICILLIN 250 MG
2 CAPSULE ORAL 2 TIMES DAILY
Qty: 40 TABLET | Refills: 0 | Status: ON HOLD | OUTPATIENT
Start: 2018-01-01 | End: 2018-01-01

## 2018-01-01 RX ORDER — NYSTATIN 100000/ML
100000 SUSPENSION, ORAL (FINAL DOSE FORM) ORAL 4 TIMES DAILY
Qty: 280 ML | Status: ON HOLD | DISCHARGE
Start: 2018-01-01 | End: 2018-01-01

## 2018-01-01 RX ORDER — BISACODYL 10 MG
10 SUPPOSITORY, RECTAL RECTAL DAILY
Status: DISCONTINUED | OUTPATIENT
Start: 2018-01-01 | End: 2018-01-01 | Stop reason: HOSPADM

## 2018-01-01 RX ORDER — HYDROCODONE BITARTRATE AND ACETAMINOPHEN 5; 325 MG/1; MG/1
1 TABLET ORAL EVERY 6 HOURS PRN
Qty: 90 TABLET | Refills: 0 | Status: ON HOLD | OUTPATIENT
Start: 2018-01-01 | End: 2018-01-01

## 2018-01-01 RX ORDER — BISACODYL 5 MG
5-10 TABLET, DELAYED RELEASE (ENTERIC COATED) ORAL DAILY PRN
Status: DISCONTINUED | OUTPATIENT
Start: 2018-01-01 | End: 2018-01-01 | Stop reason: HOSPADM

## 2018-01-01 RX ORDER — ACETAMINOPHEN 325 MG/1
975 TABLET ORAL EVERY 8 HOURS
Status: COMPLETED | OUTPATIENT
Start: 2018-01-01 | End: 2018-01-01

## 2018-01-01 RX ORDER — LIDOCAINE 4 G/G
3 PATCH TOPICAL DAILY PRN
COMMUNITY
End: 2019-01-01

## 2018-01-01 RX ORDER — LEVOFLOXACIN 250 MG/1
250 TABLET, FILM COATED ORAL DAILY
Status: DISCONTINUED | OUTPATIENT
Start: 2018-01-01 | End: 2018-01-01 | Stop reason: HOSPADM

## 2018-01-01 RX ORDER — AMOXICILLIN 500 MG/1
500 CAPSULE ORAL EVERY 12 HOURS
Qty: 28 CAPSULE | Refills: 0 | DISCHARGE
Start: 2018-01-01 | End: 2019-01-01

## 2018-01-01 RX ORDER — CLINDAMYCIN PHOSPHATE 900 MG/50ML
900 INJECTION, SOLUTION INTRAVENOUS
Status: DISCONTINUED | OUTPATIENT
Start: 2018-01-01 | End: 2018-01-01

## 2018-01-01 RX ORDER — FERROUS SULFATE 325(65) MG
325 TABLET ORAL EVERY OTHER DAY
Qty: 15 TABLET | Refills: 0 | DISCHARGE
Start: 2018-01-01 | End: 2019-01-01

## 2018-01-01 RX ORDER — ALBUMIN, HUMAN INJ 5% 5 %
SOLUTION INTRAVENOUS CONTINUOUS PRN
Status: DISCONTINUED | OUTPATIENT
Start: 2018-01-01 | End: 2018-01-01

## 2018-01-01 RX ORDER — POLYETHYLENE GLYCOL 3350 17 G/17G
17 POWDER, FOR SOLUTION ORAL DAILY PRN
Status: DISCONTINUED | OUTPATIENT
Start: 2018-01-01 | End: 2018-01-01 | Stop reason: HOSPADM

## 2018-01-01 RX ORDER — SIMVASTATIN 10 MG
10 TABLET ORAL AT BEDTIME
Status: DISCONTINUED | OUTPATIENT
Start: 2018-01-01 | End: 2018-01-01

## 2018-01-01 RX ORDER — POTASSIUM CHLORIDE 1.5 G/1.58G
20 POWDER, FOR SOLUTION ORAL DAILY
COMMUNITY
End: 2019-01-01

## 2018-01-01 RX ORDER — SUCRALFATE ORAL 1 G/10ML
1 SUSPENSION ORAL ONCE
Status: COMPLETED | OUTPATIENT
Start: 2018-01-01 | End: 2018-01-01

## 2018-01-01 RX ADMIN — TRANEXAMIC ACID 500 MG: 100 INJECTION, SOLUTION INTRAVENOUS at 05:23

## 2018-01-01 RX ADMIN — SENNOSIDES AND DOCUSATE SODIUM 2 TABLET: 8.6; 5 TABLET ORAL at 09:15

## 2018-01-01 RX ADMIN — TRAMADOL HYDROCHLORIDE 50 MG: 50 TABLET, COATED ORAL at 01:12

## 2018-01-01 RX ADMIN — CLINDAMYCIN HYDROCHLORIDE 300 MG: 300 CAPSULE ORAL at 08:15

## 2018-01-01 RX ADMIN — CLINDAMYCIN HYDROCHLORIDE 300 MG: 300 CAPSULE ORAL at 16:29

## 2018-01-01 RX ADMIN — OXYCODONE HYDROCHLORIDE 5 MG: 5 TABLET ORAL at 03:18

## 2018-01-01 RX ADMIN — PREGABALIN 75 MG: 75 CAPSULE ORAL at 21:33

## 2018-01-01 RX ADMIN — OXYCODONE HYDROCHLORIDE 5 MG: 5 TABLET ORAL at 04:30

## 2018-01-01 RX ADMIN — NEOSTIGMINE METHYLSULFATE 3.5 MG: 1 INJECTION, SOLUTION INTRAVENOUS at 09:41

## 2018-01-01 RX ADMIN — FLUTICASONE PROPIONATE 2 SPRAY: 50 SPRAY, METERED NASAL at 02:48

## 2018-01-01 RX ADMIN — APIXABAN 2.5 MG: 2.5 TABLET, FILM COATED ORAL at 09:15

## 2018-01-01 RX ADMIN — PHENYLEPHRINE HYDROCHLORIDE 100 MCG: 10 INJECTION, SOLUTION INTRAMUSCULAR; INTRAVENOUS; SUBCUTANEOUS at 09:27

## 2018-01-01 RX ADMIN — PHENYLEPHRINE HYDROCHLORIDE 100 MCG: 10 INJECTION, SOLUTION INTRAMUSCULAR; INTRAVENOUS; SUBCUTANEOUS at 14:25

## 2018-01-01 RX ADMIN — FERROUS SULFATE TAB 325 MG (65 MG ELEMENTAL FE) 325 MG: 325 (65 FE) TAB at 09:59

## 2018-01-01 RX ADMIN — UMECLIDINIUM 1 PUFF: 62.5 AEROSOL, POWDER ORAL at 20:33

## 2018-01-01 RX ADMIN — OXYCODONE HYDROCHLORIDE 5 MG: 5 TABLET ORAL at 01:50

## 2018-01-01 RX ADMIN — POTASSIUM CHLORIDE 20 MEQ: 1.5 POWDER, FOR SOLUTION ORAL at 08:38

## 2018-01-01 RX ADMIN — SENNOSIDES AND DOCUSATE SODIUM 2 TABLET: 8.6; 5 TABLET ORAL at 09:58

## 2018-01-01 RX ADMIN — UMECLIDINIUM 1 PUFF: 62.5 AEROSOL, POWDER ORAL at 22:06

## 2018-01-01 RX ADMIN — Medication 12.5 MG: at 21:01

## 2018-01-01 RX ADMIN — ACETAMINOPHEN 975 MG: 325 TABLET, FILM COATED ORAL at 17:50

## 2018-01-01 RX ADMIN — PANTOPRAZOLE SODIUM 40 MG: 40 INJECTION, POWDER, FOR SOLUTION INTRAVENOUS at 10:17

## 2018-01-01 RX ADMIN — ACETAMINOPHEN 975 MG: 325 TABLET, FILM COATED ORAL at 05:11

## 2018-01-01 RX ADMIN — GENTAMICIN SULFATE 1000 ML: 40 INJECTION, SOLUTION INTRAMUSCULAR; INTRAVENOUS at 08:30

## 2018-01-01 RX ADMIN — PHENYLEPHRINE HYDROCHLORIDE 0.25 MCG/KG/MIN: 10 INJECTION, SOLUTION INTRAMUSCULAR; INTRAVENOUS; SUBCUTANEOUS at 08:16

## 2018-01-01 RX ADMIN — OXYCODONE HYDROCHLORIDE 5 MG: 5 TABLET ORAL at 13:47

## 2018-01-01 RX ADMIN — GUAIFENESIN 600 MG: 600 TABLET, EXTENDED RELEASE ORAL at 10:01

## 2018-01-01 RX ADMIN — OXYCODONE HYDROCHLORIDE 5 MG: 5 TABLET ORAL at 06:52

## 2018-01-01 RX ADMIN — OXYCODONE HYDROCHLORIDE 5 MG: 5 TABLET ORAL at 09:02

## 2018-01-01 RX ADMIN — UMECLIDINIUM 1 PUFF: 62.5 AEROSOL, POWDER ORAL at 21:45

## 2018-01-01 RX ADMIN — OXYCODONE HYDROCHLORIDE 5 MG: 5 TABLET ORAL at 19:40

## 2018-01-01 RX ADMIN — CLINDAMYCIN HYDROCHLORIDE 300 MG: 300 CAPSULE ORAL at 20:30

## 2018-01-01 RX ADMIN — POTASSIUM CHLORIDE 20 MEQ: 1.5 POWDER, FOR SOLUTION ORAL at 09:59

## 2018-01-01 RX ADMIN — LIDOCAINE HYDROCHLORIDE 0.2 ML: 10 INJECTION, SOLUTION EPIDURAL; INFILTRATION; INTRACAUDAL; PERINEURAL at 07:04

## 2018-01-01 RX ADMIN — Medication 0.1 MG: at 11:44

## 2018-01-01 RX ADMIN — INSULIN ASPART 1 UNITS: 100 INJECTION, SOLUTION INTRAVENOUS; SUBCUTANEOUS at 13:47

## 2018-01-01 RX ADMIN — LEVOFLOXACIN 500 MG: 500 TABLET, FILM COATED ORAL at 07:46

## 2018-01-01 RX ADMIN — METOPROLOL TARTRATE 25 MG: 25 TABLET ORAL at 12:05

## 2018-01-01 RX ADMIN — ALBUTEROL SULFATE 2.5 MG: 2.5 SOLUTION RESPIRATORY (INHALATION) at 15:46

## 2018-01-01 RX ADMIN — SENNOSIDES AND DOCUSATE SODIUM 2 TABLET: 8.6; 5 TABLET ORAL at 21:38

## 2018-01-01 RX ADMIN — OXYCODONE HYDROCHLORIDE 5 MG: 5 TABLET ORAL at 16:18

## 2018-01-01 RX ADMIN — POLYETHYLENE GLYCOL 3350 17 G: 17 POWDER, FOR SOLUTION ORAL at 08:48

## 2018-01-01 RX ADMIN — PREGABALIN 75 MG: 25 CAPSULE ORAL at 10:46

## 2018-01-01 RX ADMIN — SODIUM CHLORIDE, POTASSIUM CHLORIDE, SODIUM LACTATE AND CALCIUM CHLORIDE: 600; 310; 30; 20 INJECTION, SOLUTION INTRAVENOUS at 12:52

## 2018-01-01 RX ADMIN — LORAZEPAM 0.5 MG: 0.5 TABLET ORAL at 12:54

## 2018-01-01 RX ADMIN — SUCRALFATE 1 G: 1 SUSPENSION ORAL at 20:33

## 2018-01-01 RX ADMIN — OXYCODONE HYDROCHLORIDE 5 MG: 5 TABLET ORAL at 09:20

## 2018-01-01 RX ADMIN — RANITIDINE 150 MG: 150 TABLET ORAL at 07:52

## 2018-01-01 RX ADMIN — SENNOSIDES AND DOCUSATE SODIUM 2 TABLET: 8.6; 5 TABLET ORAL at 20:18

## 2018-01-01 RX ADMIN — SODIUM CHLORIDE, POTASSIUM CHLORIDE, SODIUM LACTATE AND CALCIUM CHLORIDE: 600; 310; 30; 20 INJECTION, SOLUTION INTRAVENOUS at 23:16

## 2018-01-01 RX ADMIN — PIPERACILLIN AND TAZOBACTAM 2.25 G: 2; .25 INJECTION, POWDER, FOR SOLUTION INTRAVENOUS at 17:54

## 2018-01-01 RX ADMIN — PIPERACILLIN AND TAZOBACTAM 2.25 G: 2; .25 INJECTION, POWDER, FOR SOLUTION INTRAVENOUS at 06:55

## 2018-01-01 RX ADMIN — UMECLIDINIUM 1 PUFF: 62.5 AEROSOL, POWDER ORAL at 21:32

## 2018-01-01 RX ADMIN — SODIUM CHLORIDE, POTASSIUM CHLORIDE, SODIUM LACTATE AND CALCIUM CHLORIDE: 600; 310; 30; 20 INJECTION, SOLUTION INTRAVENOUS at 13:42

## 2018-01-01 RX ADMIN — INSULIN ASPART 1 UNITS: 100 INJECTION, SOLUTION INTRAVENOUS; SUBCUTANEOUS at 17:50

## 2018-01-01 RX ADMIN — OXYCODONE HYDROCHLORIDE 5 MG: 5 TABLET ORAL at 05:23

## 2018-01-01 RX ADMIN — Medication 12.5 MG: at 10:01

## 2018-01-01 RX ADMIN — RANITIDINE 150 MG: 150 TABLET ORAL at 21:38

## 2018-01-01 RX ADMIN — OXYCODONE HYDROCHLORIDE 5 MG: 5 TABLET ORAL at 01:32

## 2018-01-01 RX ADMIN — SODIUM CHLORIDE, POTASSIUM CHLORIDE, SODIUM LACTATE AND CALCIUM CHLORIDE: 600; 310; 30; 20 INJECTION, SOLUTION INTRAVENOUS at 18:30

## 2018-01-01 RX ADMIN — METOPROLOL TARTRATE 25 MG: 25 TABLET ORAL at 20:16

## 2018-01-01 RX ADMIN — ACETAMINOPHEN 975 MG: 325 TABLET, FILM COATED ORAL at 17:45

## 2018-01-01 RX ADMIN — ACETAMINOPHEN 650 MG: 325 TABLET, FILM COATED ORAL at 20:29

## 2018-01-01 RX ADMIN — CLINDAMYCIN HYDROCHLORIDE 300 MG: 300 CAPSULE ORAL at 08:33

## 2018-01-01 RX ADMIN — SODIUM CHLORIDE 500 ML: 9 INJECTION, SOLUTION INTRAVENOUS at 16:17

## 2018-01-01 RX ADMIN — UMECLIDINIUM 1 PUFF: 62.5 AEROSOL, POWDER ORAL at 01:21

## 2018-01-01 RX ADMIN — ACETAMINOPHEN 975 MG: 325 TABLET, FILM COATED ORAL at 21:33

## 2018-01-01 RX ADMIN — PHENYLEPHRINE HYDROCHLORIDE 50 MCG: 10 INJECTION, SOLUTION INTRAMUSCULAR; INTRAVENOUS; SUBCUTANEOUS at 08:01

## 2018-01-01 RX ADMIN — METOPROLOL TARTRATE 25 MG: 25 TABLET ORAL at 20:12

## 2018-01-01 RX ADMIN — OXYCODONE HYDROCHLORIDE 5 MG: 5 TABLET ORAL at 00:51

## 2018-01-01 RX ADMIN — SENNOSIDES AND DOCUSATE SODIUM 2 TABLET: 8.6; 5 TABLET ORAL at 08:15

## 2018-01-01 RX ADMIN — ENOXAPARIN SODIUM 40 MG: 40 INJECTION SUBCUTANEOUS at 11:42

## 2018-01-01 RX ADMIN — PIPERACILLIN AND TAZOBACTAM 2.25 G: 2; .25 INJECTION, POWDER, FOR SOLUTION INTRAVENOUS at 06:10

## 2018-01-01 RX ADMIN — TORSEMIDE 20 MG: 20 TABLET ORAL at 09:58

## 2018-01-01 RX ADMIN — INSULIN ASPART 1 UNITS: 100 INJECTION, SOLUTION INTRAVENOUS; SUBCUTANEOUS at 16:13

## 2018-01-01 RX ADMIN — METOPROLOL TARTRATE 25 MG: 25 TABLET ORAL at 21:33

## 2018-01-01 RX ADMIN — GUAIFENESIN 600 MG: 600 TABLET, EXTENDED RELEASE ORAL at 08:14

## 2018-01-01 RX ADMIN — ACETAMINOPHEN 975 MG: 325 TABLET, FILM COATED ORAL at 01:35

## 2018-01-01 RX ADMIN — ALBUTEROL SULFATE 2.5 MG: 2.5 SOLUTION RESPIRATORY (INHALATION) at 19:34

## 2018-01-01 RX ADMIN — CLINDAMYCIN HYDROCHLORIDE 300 MG: 300 CAPSULE ORAL at 08:47

## 2018-01-01 RX ADMIN — CLINDAMYCIN IN 5 PERCENT DEXTROSE 900 MG: 18 INJECTION, SOLUTION INTRAVENOUS at 23:16

## 2018-01-01 RX ADMIN — RANITIDINE 150 MG: 150 TABLET ORAL at 21:10

## 2018-01-01 RX ADMIN — LISINOPRIL 10 MG: 10 TABLET ORAL at 20:29

## 2018-01-01 RX ADMIN — METOPROLOL TARTRATE 25 MG: 25 TABLET ORAL at 08:33

## 2018-01-01 RX ADMIN — METOPROLOL TARTRATE 25 MG: 25 TABLET ORAL at 08:01

## 2018-01-01 RX ADMIN — PIPERACILLIN AND TAZOBACTAM 2.25 G: 2; .25 INJECTION, POWDER, FOR SOLUTION INTRAVENOUS at 23:08

## 2018-01-01 RX ADMIN — Medication 0.5 MG: at 23:23

## 2018-01-01 RX ADMIN — ACETAMINOPHEN 975 MG: 325 TABLET, FILM COATED ORAL at 08:55

## 2018-01-01 RX ADMIN — SIMVASTATIN 10 MG: 10 TABLET, FILM COATED ORAL at 21:44

## 2018-01-01 RX ADMIN — OXYCODONE HYDROCHLORIDE 5 MG: 5 TABLET ORAL at 06:25

## 2018-01-01 RX ADMIN — SODIUM CHLORIDE: 9 INJECTION, SOLUTION INTRAVENOUS at 14:14

## 2018-01-01 RX ADMIN — NYSTATIN 500000 UNITS: 100000 SUSPENSION ORAL at 20:38

## 2018-01-01 RX ADMIN — INSULIN ASPART 1 UNITS: 100 INJECTION, SOLUTION INTRAVENOUS; SUBCUTANEOUS at 21:47

## 2018-01-01 RX ADMIN — IRON SUCROSE 300 MG: 20 INJECTION, SOLUTION INTRAVENOUS at 07:50

## 2018-01-01 RX ADMIN — GUAIFENESIN 600 MG: 600 TABLET, EXTENDED RELEASE ORAL at 21:38

## 2018-01-01 RX ADMIN — SODIUM CHLORIDE: 9 INJECTION, SOLUTION INTRAVENOUS at 08:25

## 2018-01-01 RX ADMIN — ACETAMINOPHEN 975 MG: 325 TABLET, FILM COATED ORAL at 04:50

## 2018-01-01 RX ADMIN — METOPROLOL TARTRATE 25 MG: 25 TABLET ORAL at 20:26

## 2018-01-01 RX ADMIN — ASPIRIN 81 MG: 81 TABLET, COATED ORAL at 07:46

## 2018-01-01 RX ADMIN — SENNOSIDES AND DOCUSATE SODIUM 2 TABLET: 8.6; 5 TABLET ORAL at 22:05

## 2018-01-01 RX ADMIN — RANITIDINE 150 MG: 150 TABLET ORAL at 08:01

## 2018-01-01 RX ADMIN — HEPARIN SODIUM 5000 UNITS: 5000 INJECTION, SOLUTION INTRAVENOUS; SUBCUTANEOUS at 00:52

## 2018-01-01 RX ADMIN — METOPROLOL TARTRATE 25 MG: 25 TABLET ORAL at 08:39

## 2018-01-01 RX ADMIN — OXYCODONE HYDROCHLORIDE 5 MG: 5 TABLET ORAL at 04:16

## 2018-01-01 RX ADMIN — ACETAMINOPHEN 650 MG: 325 TABLET, FILM COATED ORAL at 00:23

## 2018-01-01 RX ADMIN — INSULIN ASPART 1 UNITS: 100 INJECTION, SOLUTION INTRAVENOUS; SUBCUTANEOUS at 08:15

## 2018-01-01 RX ADMIN — POLYETHYLENE GLYCOL 3350 17 G: 17 POWDER, FOR SOLUTION ORAL at 20:29

## 2018-01-01 RX ADMIN — GLYCOPYRROLATE 0.8 MG: 0.2 INJECTION, SOLUTION INTRAMUSCULAR; INTRAVENOUS at 14:41

## 2018-01-01 RX ADMIN — GUAIFENESIN 600 MG: 600 TABLET, EXTENDED RELEASE ORAL at 21:10

## 2018-01-01 RX ADMIN — GUAIFENESIN 600 MG: 600 TABLET, EXTENDED RELEASE ORAL at 08:15

## 2018-01-01 RX ADMIN — Medication 0.5 MG: at 22:44

## 2018-01-01 RX ADMIN — ASPIRIN 81 MG: 81 TABLET, COATED ORAL at 08:56

## 2018-01-01 RX ADMIN — RANITIDINE 150 MG: 150 TABLET ORAL at 21:02

## 2018-01-01 RX ADMIN — LIDOCAINE HYDROCHLORIDE 40 MG: 20 INJECTION, SOLUTION INFILTRATION; PERINEURAL at 07:53

## 2018-01-01 RX ADMIN — SENNOSIDES AND DOCUSATE SODIUM 2 TABLET: 8.6; 5 TABLET ORAL at 20:11

## 2018-01-01 RX ADMIN — APIXABAN 2.5 MG: 2.5 TABLET, FILM COATED ORAL at 12:05

## 2018-01-01 RX ADMIN — PROPOFOL 100 MG: 10 INJECTION, EMULSION INTRAVENOUS at 13:43

## 2018-01-01 RX ADMIN — PIPERACILLIN AND TAZOBACTAM 2.25 G: 2; .25 INJECTION, POWDER, FOR SOLUTION INTRAVENOUS at 05:22

## 2018-01-01 RX ADMIN — INSULIN ASPART 2 UNITS: 100 INJECTION, SOLUTION INTRAVENOUS; SUBCUTANEOUS at 12:31

## 2018-01-01 RX ADMIN — CLINDAMYCIN HYDROCHLORIDE 300 MG: 300 CAPSULE ORAL at 08:14

## 2018-01-01 RX ADMIN — OXYCODONE HYDROCHLORIDE 5 MG: 5 TABLET ORAL at 00:06

## 2018-01-01 RX ADMIN — PROPOFOL 90 MG: 10 INJECTION, EMULSION INTRAVENOUS at 07:53

## 2018-01-01 RX ADMIN — PHENYLEPHRINE HYDROCHLORIDE 0.3 MCG/KG/MIN: 10 INJECTION, SOLUTION INTRAMUSCULAR; INTRAVENOUS; SUBCUTANEOUS at 10:15

## 2018-01-01 RX ADMIN — APIXABAN 2.5 MG: 2.5 TABLET, FILM COATED ORAL at 20:12

## 2018-01-01 RX ADMIN — PREGABALIN 75 MG: 25 CAPSULE ORAL at 07:51

## 2018-01-01 RX ADMIN — ACETAMINOPHEN 650 MG: 325 TABLET, FILM COATED ORAL at 02:10

## 2018-01-01 RX ADMIN — PHENYLEPHRINE HYDROCHLORIDE 100 MCG: 10 INJECTION, SOLUTION INTRAMUSCULAR; INTRAVENOUS; SUBCUTANEOUS at 09:48

## 2018-01-01 RX ADMIN — ACETAMINOPHEN 975 MG: 325 TABLET, FILM COATED ORAL at 09:58

## 2018-01-01 RX ADMIN — INSULIN ASPART 1 UNITS: 100 INJECTION, SOLUTION INTRAVENOUS; SUBCUTANEOUS at 10:00

## 2018-01-01 RX ADMIN — Medication 1 MG: at 01:12

## 2018-01-01 RX ADMIN — SODIUM CHLORIDE: 9 INJECTION, SOLUTION INTRAVENOUS at 09:58

## 2018-01-01 RX ADMIN — APIXABAN 2.5 MG: 2.5 TABLET, FILM COATED ORAL at 07:46

## 2018-01-01 RX ADMIN — PREGABALIN 75 MG: 25 CAPSULE ORAL at 08:18

## 2018-01-01 RX ADMIN — HYDROMORPHONE HYDROCHLORIDE 0.5 MG: 1 INJECTION, SOLUTION INTRAMUSCULAR; INTRAVENOUS; SUBCUTANEOUS at 23:23

## 2018-01-01 RX ADMIN — RANITIDINE 150 MG: 150 TABLET ORAL at 21:43

## 2018-01-01 RX ADMIN — PHENYLEPHRINE HYDROCHLORIDE 0.3 MCG/KG/MIN: 10 INJECTION, SOLUTION INTRAMUSCULAR; INTRAVENOUS; SUBCUTANEOUS at 10:30

## 2018-01-01 RX ADMIN — METOPROLOL TARTRATE 25 MG: 25 TABLET ORAL at 06:57

## 2018-01-01 RX ADMIN — NYSTATIN 500000 UNITS: 100000 SUSPENSION ORAL at 21:01

## 2018-01-01 RX ADMIN — CLINDAMYCIN HYDROCHLORIDE 300 MG: 300 CAPSULE ORAL at 08:57

## 2018-01-01 RX ADMIN — SIMVASTATIN 10 MG: 10 TABLET, FILM COATED ORAL at 22:03

## 2018-01-01 RX ADMIN — ASPIRIN 81 MG: 81 TABLET, COATED ORAL at 08:15

## 2018-01-01 RX ADMIN — SODIUM CHLORIDE, POTASSIUM CHLORIDE, SODIUM LACTATE AND CALCIUM CHLORIDE: 600; 310; 30; 20 INJECTION, SOLUTION INTRAVENOUS at 11:46

## 2018-01-01 RX ADMIN — UMECLIDINIUM 1 PUFF: 62.5 AEROSOL, POWDER ORAL at 19:54

## 2018-01-01 RX ADMIN — PREGABALIN 75 MG: 25 CAPSULE ORAL at 22:11

## 2018-01-01 RX ADMIN — APIXABAN 2.5 MG: 2.5 TABLET, FILM COATED ORAL at 08:56

## 2018-01-01 RX ADMIN — Medication 0.2 MG: at 17:28

## 2018-01-01 RX ADMIN — OXYCODONE HYDROCHLORIDE 5 MG: 5 TABLET ORAL at 10:11

## 2018-01-01 RX ADMIN — HYDROXYZINE PAMOATE 25 MG: 25 CAPSULE ORAL at 06:08

## 2018-01-01 RX ADMIN — SIMVASTATIN 10 MG: 10 TABLET, FILM COATED ORAL at 21:35

## 2018-01-01 RX ADMIN — LISINOPRIL 10 MG: 10 TABLET ORAL at 20:36

## 2018-01-01 RX ADMIN — APIXABAN 2.5 MG: 2.5 TABLET, FILM COATED ORAL at 20:34

## 2018-01-01 RX ADMIN — SUCRALFATE 1 G: 1 SUSPENSION ORAL at 15:50

## 2018-01-01 RX ADMIN — SIMVASTATIN 10 MG: 10 TABLET, FILM COATED ORAL at 21:02

## 2018-01-01 RX ADMIN — FERROUS SULFATE TAB 325 MG (65 MG ELEMENTAL FE) 325 MG: 325 (65 FE) TAB at 08:19

## 2018-01-01 RX ADMIN — HEPARIN SODIUM 5000 UNITS: 5000 INJECTION, SOLUTION INTRAVENOUS; SUBCUTANEOUS at 01:44

## 2018-01-01 RX ADMIN — POLYETHYLENE GLYCOL 3350 17 G: 17 POWDER, FOR SOLUTION ORAL at 08:13

## 2018-01-01 RX ADMIN — INSULIN ASPART 1 UNITS: 100 INJECTION, SOLUTION INTRAVENOUS; SUBCUTANEOUS at 02:19

## 2018-01-01 RX ADMIN — SENNOSIDES AND DOCUSATE SODIUM 2 TABLET: 8.6; 5 TABLET ORAL at 20:28

## 2018-01-01 RX ADMIN — Medication 2 LOZENGE: at 20:48

## 2018-01-01 RX ADMIN — POTASSIUM CHLORIDE 20 MEQ: 1.5 POWDER, FOR SOLUTION ORAL at 08:48

## 2018-01-01 RX ADMIN — ACETAMINOPHEN 975 MG: 325 TABLET, FILM COATED ORAL at 21:42

## 2018-01-01 RX ADMIN — SODIUM CHLORIDE, PRESERVATIVE FREE 66 ML: 5 INJECTION INTRAVENOUS at 13:44

## 2018-01-01 RX ADMIN — ACETAMINOPHEN 975 MG: 325 TABLET, FILM COATED ORAL at 01:59

## 2018-01-01 RX ADMIN — OXYCODONE HYDROCHLORIDE 5 MG: 5 TABLET ORAL at 22:06

## 2018-01-01 RX ADMIN — INSULIN ASPART 2 UNITS: 100 INJECTION, SOLUTION INTRAVENOUS; SUBCUTANEOUS at 08:56

## 2018-01-01 RX ADMIN — AMOXICILLIN 500 MG: 500 CAPSULE ORAL at 09:52

## 2018-01-01 RX ADMIN — GUAIFENESIN 600 MG: 600 TABLET, EXTENDED RELEASE ORAL at 08:33

## 2018-01-01 RX ADMIN — SODIUM CHLORIDE 1000 ML: 9 INJECTION, SOLUTION INTRAVENOUS at 02:34

## 2018-01-01 RX ADMIN — INSULIN ASPART 1 UNITS: 100 INJECTION, SOLUTION INTRAVENOUS; SUBCUTANEOUS at 18:14

## 2018-01-01 RX ADMIN — PREGABALIN 75 MG: 25 CAPSULE ORAL at 22:05

## 2018-01-01 RX ADMIN — PIPERACILLIN AND TAZOBACTAM 2.25 G: 2; .25 INJECTION, POWDER, FOR SOLUTION INTRAVENOUS at 11:28

## 2018-01-01 RX ADMIN — SENNOSIDES AND DOCUSATE SODIUM 1 TABLET: 8.6; 5 TABLET ORAL at 08:41

## 2018-01-01 RX ADMIN — SENNOSIDES AND DOCUSATE SODIUM 2 TABLET: 8.6; 5 TABLET ORAL at 08:51

## 2018-01-01 RX ADMIN — LISINOPRIL 10 MG: 10 TABLET ORAL at 07:46

## 2018-01-01 RX ADMIN — FUROSEMIDE 20 MG: 10 INJECTION, SOLUTION INTRAVENOUS at 10:45

## 2018-01-01 RX ADMIN — ALBUTEROL SULFATE 2.5 MG: 2.5 SOLUTION RESPIRATORY (INHALATION) at 07:52

## 2018-01-01 RX ADMIN — ACETAMINOPHEN 975 MG: 325 TABLET, FILM COATED ORAL at 13:34

## 2018-01-01 RX ADMIN — SIMVASTATIN 10 MG: 10 TABLET, FILM COATED ORAL at 23:20

## 2018-01-01 RX ADMIN — MAGNESIUM HYDROXIDE 30 ML: 400 SUSPENSION ORAL at 10:38

## 2018-01-01 RX ADMIN — PIPERACILLIN AND TAZOBACTAM 2.25 G: 2; .25 INJECTION, POWDER, FOR SOLUTION INTRAVENOUS at 23:20

## 2018-01-01 RX ADMIN — GLIPIZIDE 5 MG: 5 TABLET ORAL at 06:56

## 2018-01-01 RX ADMIN — APIXABAN 2.5 MG: 2.5 TABLET, FILM COATED ORAL at 08:19

## 2018-01-01 RX ADMIN — ONDANSETRON 4 MG: 2 INJECTION INTRAMUSCULAR; INTRAVENOUS at 14:11

## 2018-01-01 RX ADMIN — SIMVASTATIN 10 MG: 10 TABLET, FILM COATED ORAL at 22:11

## 2018-01-01 RX ADMIN — ROCURONIUM BROMIDE 30 MG: 10 INJECTION INTRAVENOUS at 13:43

## 2018-01-01 RX ADMIN — NYSTATIN 500000 UNITS: 100000 SUSPENSION ORAL at 12:00

## 2018-01-01 RX ADMIN — PIPERACILLIN AND TAZOBACTAM 2.25 G: 2; .25 INJECTION, POWDER, FOR SOLUTION INTRAVENOUS at 11:53

## 2018-01-01 RX ADMIN — PREGABALIN 75 MG: 25 CAPSULE ORAL at 09:58

## 2018-01-01 RX ADMIN — RANITIDINE 150 MG: 150 TABLET ORAL at 09:59

## 2018-01-01 RX ADMIN — SUCRALFATE 1 G: 1 SUSPENSION ORAL at 07:46

## 2018-01-01 RX ADMIN — ASPIRIN 81 MG: 81 TABLET, COATED ORAL at 10:01

## 2018-01-01 RX ADMIN — OXYCODONE HYDROCHLORIDE 5 MG: 5 TABLET ORAL at 14:37

## 2018-01-01 RX ADMIN — MAGNESIUM HYDROXIDE 30 ML: 400 SUSPENSION ORAL at 21:34

## 2018-01-01 RX ADMIN — ACETAMINOPHEN 650 MG: 325 TABLET, FILM COATED ORAL at 05:12

## 2018-01-01 RX ADMIN — ACETAMINOPHEN 650 MG: 325 TABLET, FILM COATED ORAL at 16:29

## 2018-01-01 RX ADMIN — RANITIDINE 150 MG: 150 TABLET ORAL at 08:19

## 2018-01-01 RX ADMIN — IOPAMIDOL 85 ML: 755 INJECTION, SOLUTION INTRAVENOUS at 13:44

## 2018-01-01 RX ADMIN — CLINDAMYCIN IN 5 PERCENT DEXTROSE 900 MG: 18 INJECTION, SOLUTION INTRAVENOUS at 18:15

## 2018-01-01 RX ADMIN — SENNOSIDES AND DOCUSATE SODIUM 2 TABLET: 8.6; 5 TABLET ORAL at 21:33

## 2018-01-01 RX ADMIN — SODIUM CHLORIDE, POTASSIUM CHLORIDE, SODIUM LACTATE AND CALCIUM CHLORIDE: 600; 310; 30; 20 INJECTION, SOLUTION INTRAVENOUS at 07:04

## 2018-01-01 RX ADMIN — HEPARIN SODIUM 5000 UNITS: 5000 INJECTION, SOLUTION INTRAVENOUS; SUBCUTANEOUS at 12:11

## 2018-01-01 RX ADMIN — OXYCODONE HYDROCHLORIDE 5 MG: 5 TABLET ORAL at 16:34

## 2018-01-01 RX ADMIN — Medication 10 MG: at 12:04

## 2018-01-01 RX ADMIN — SUCRALFATE 1 G: 1 SUSPENSION ORAL at 08:14

## 2018-01-01 RX ADMIN — SODIUM CHLORIDE: 9 INJECTION, SOLUTION INTRAVENOUS at 05:01

## 2018-01-01 RX ADMIN — SENNOSIDES AND DOCUSATE SODIUM 2 TABLET: 8.6; 5 TABLET ORAL at 21:10

## 2018-01-01 RX ADMIN — OXYCODONE HYDROCHLORIDE 5 MG: 5 TABLET ORAL at 20:38

## 2018-01-01 RX ADMIN — SUCRALFATE 1 G: 1 SUSPENSION ORAL at 13:34

## 2018-01-01 RX ADMIN — GLYCOPYRROLATE 0.5 MG: 0.2 INJECTION, SOLUTION INTRAMUSCULAR; INTRAVENOUS at 09:41

## 2018-01-01 RX ADMIN — NEOSTIGMINE METHYLSULFATE 4 MG: 1 INJECTION, SOLUTION INTRAVENOUS at 14:41

## 2018-01-01 RX ADMIN — APIXABAN 2.5 MG: 2.5 TABLET, FILM COATED ORAL at 09:59

## 2018-01-01 RX ADMIN — CLINDAMYCIN IN 5 PERCENT DEXTROSE 900 MG: 18 INJECTION, SOLUTION INTRAVENOUS at 03:01

## 2018-01-01 RX ADMIN — FUROSEMIDE 20 MG: 10 INJECTION, SOLUTION INTRAVENOUS at 11:27

## 2018-01-01 RX ADMIN — ROCURONIUM BROMIDE 35 MG: 10 INJECTION INTRAVENOUS at 07:53

## 2018-01-01 RX ADMIN — UMECLIDINIUM 1 PUFF: 62.5 AEROSOL, POWDER ORAL at 20:43

## 2018-01-01 RX ADMIN — OXYCODONE HYDROCHLORIDE 5 MG: 5 TABLET ORAL at 10:09

## 2018-01-01 RX ADMIN — RANITIDINE 150 MG: 150 TABLET ORAL at 20:29

## 2018-01-01 RX ADMIN — SIMVASTATIN 10 MG: 10 TABLET, FILM COATED ORAL at 20:29

## 2018-01-01 RX ADMIN — TRANEXAMIC ACID 1 G: 100 INJECTION, SOLUTION INTRAVENOUS at 08:23

## 2018-01-01 RX ADMIN — CLINDAMYCIN HYDROCHLORIDE 300 MG: 300 CAPSULE ORAL at 16:06

## 2018-01-01 RX ADMIN — OXYCODONE HYDROCHLORIDE 5 MG: 5 TABLET ORAL at 05:01

## 2018-01-01 RX ADMIN — PREGABALIN 75 MG: 25 CAPSULE ORAL at 20:11

## 2018-01-01 RX ADMIN — CEFTRIAXONE SODIUM 1 G: 1 INJECTION, POWDER, FOR SOLUTION INTRAMUSCULAR; INTRAVENOUS at 18:03

## 2018-01-01 RX ADMIN — NYSTATIN 500000 UNITS: 100000 SUSPENSION ORAL at 17:03

## 2018-01-01 RX ADMIN — GUAIFENESIN 600 MG: 600 TABLET, EXTENDED RELEASE ORAL at 21:33

## 2018-01-01 RX ADMIN — OXYCODONE HYDROCHLORIDE 5 MG: 5 TABLET ORAL at 13:42

## 2018-01-01 RX ADMIN — PREGABALIN 75 MG: 75 CAPSULE ORAL at 08:33

## 2018-01-01 RX ADMIN — INSULIN ASPART 1 UNITS: 100 INJECTION, SOLUTION INTRAVENOUS; SUBCUTANEOUS at 18:17

## 2018-01-01 RX ADMIN — ASPIRIN 81 MG: 81 TABLET, COATED ORAL at 12:05

## 2018-01-01 RX ADMIN — UMECLIDINIUM 1 PUFF: 62.5 AEROSOL, POWDER ORAL at 21:10

## 2018-01-01 RX ADMIN — OXYCODONE HYDROCHLORIDE 5 MG: 5 TABLET ORAL at 21:01

## 2018-01-01 RX ADMIN — ASPIRIN 81 MG: 81 TABLET, COATED ORAL at 08:33

## 2018-01-01 RX ADMIN — PREGABALIN 75 MG: 75 CAPSULE ORAL at 21:38

## 2018-01-01 RX ADMIN — APIXABAN 2.5 MG: 2.5 TABLET, FILM COATED ORAL at 08:51

## 2018-01-01 RX ADMIN — GLIPIZIDE 5 MG: 5 TABLET ORAL at 16:26

## 2018-01-01 RX ADMIN — HEPARIN SODIUM 5000 UNITS: 5000 INJECTION, SOLUTION INTRAVENOUS; SUBCUTANEOUS at 11:59

## 2018-01-01 RX ADMIN — PREGABALIN 75 MG: 25 CAPSULE ORAL at 09:15

## 2018-01-01 RX ADMIN — SODIUM CHLORIDE: 9 INJECTION, SOLUTION INTRAVENOUS at 21:44

## 2018-01-01 RX ADMIN — METOCLOPRAMIDE 5 MG: 5 INJECTION, SOLUTION INTRAMUSCULAR; INTRAVENOUS at 12:35

## 2018-01-01 RX ADMIN — PREGABALIN 75 MG: 75 CAPSULE ORAL at 08:15

## 2018-01-01 RX ADMIN — IPRATROPIUM BROMIDE AND ALBUTEROL SULFATE 3 ML: 2.5; .5 SOLUTION RESPIRATORY (INHALATION) at 20:34

## 2018-01-01 RX ADMIN — FAMOTIDINE 20 MG: 10 INJECTION INTRAVENOUS at 12:32

## 2018-01-01 RX ADMIN — SODIUM CHLORIDE, POTASSIUM CHLORIDE, SODIUM LACTATE AND CALCIUM CHLORIDE: 600; 310; 30; 20 INJECTION, SOLUTION INTRAVENOUS at 06:47

## 2018-01-01 RX ADMIN — SENNOSIDES AND DOCUSATE SODIUM 2 TABLET: 8.6; 5 TABLET ORAL at 20:29

## 2018-01-01 RX ADMIN — PHENYLEPHRINE HYDROCHLORIDE 100 MCG: 10 INJECTION, SOLUTION INTRAMUSCULAR; INTRAVENOUS; SUBCUTANEOUS at 14:37

## 2018-01-01 RX ADMIN — SODIUM CHLORIDE, POTASSIUM CHLORIDE, SODIUM LACTATE AND CALCIUM CHLORIDE: 600; 310; 30; 20 INJECTION, SOLUTION INTRAVENOUS at 14:44

## 2018-01-01 RX ADMIN — PREGABALIN 75 MG: 25 CAPSULE ORAL at 20:28

## 2018-01-01 RX ADMIN — OXYCODONE HYDROCHLORIDE 5 MG: 5 TABLET ORAL at 20:18

## 2018-01-01 RX ADMIN — TRAMADOL HYDROCHLORIDE 50 MG: 50 TABLET, COATED ORAL at 19:06

## 2018-01-01 RX ADMIN — PREGABALIN 75 MG: 25 CAPSULE ORAL at 08:38

## 2018-01-01 RX ADMIN — SODIUM CHLORIDE 1000 ML: 9 INJECTION, SOLUTION INTRAVENOUS at 14:30

## 2018-01-01 RX ADMIN — SODIUM CHLORIDE, POTASSIUM CHLORIDE, SODIUM LACTATE AND CALCIUM CHLORIDE: 600; 310; 30; 20 INJECTION, SOLUTION INTRAVENOUS at 23:10

## 2018-01-01 RX ADMIN — SENNOSIDES AND DOCUSATE SODIUM 2 TABLET: 8.6; 5 TABLET ORAL at 01:18

## 2018-01-01 RX ADMIN — OXYCODONE HYDROCHLORIDE 5 MG: 5 TABLET ORAL at 04:33

## 2018-01-01 RX ADMIN — MAGNESIUM HYDROXIDE 30 ML: 400 SUSPENSION ORAL at 08:57

## 2018-01-01 RX ADMIN — SENNOSIDES AND DOCUSATE SODIUM 2 TABLET: 8.6; 5 TABLET ORAL at 08:38

## 2018-01-01 RX ADMIN — ALBUTEROL SULFATE 2.5 MG: 2.5 SOLUTION RESPIRATORY (INHALATION) at 07:16

## 2018-01-01 RX ADMIN — ASPIRIN 81 MG: 81 TABLET, COATED ORAL at 16:30

## 2018-01-01 RX ADMIN — TORSEMIDE 20 MG: 20 TABLET ORAL at 08:38

## 2018-01-01 RX ADMIN — SODIUM CHLORIDE: 9 INJECTION, SOLUTION INTRAVENOUS at 06:08

## 2018-01-01 RX ADMIN — APIXABAN 2.5 MG: 2.5 TABLET, FILM COATED ORAL at 20:18

## 2018-01-01 RX ADMIN — SODIUM CHLORIDE: 9 INJECTION, SOLUTION INTRAVENOUS at 19:40

## 2018-01-01 RX ADMIN — MICONAZOLE NITRATE 100 MG: 100 SUPPOSITORY VAGINAL at 22:01

## 2018-01-01 RX ADMIN — FENTANYL CITRATE 25 MCG: 50 INJECTION, SOLUTION INTRAMUSCULAR; INTRAVENOUS at 13:43

## 2018-01-01 RX ADMIN — RANITIDINE 150 MG: 150 TABLET ORAL at 08:39

## 2018-01-01 RX ADMIN — POLYETHYLENE GLYCOL 3350 17 G: 17 POWDER, FOR SOLUTION ORAL at 10:02

## 2018-01-01 RX ADMIN — SODIUM CHLORIDE: 9 INJECTION, SOLUTION INTRAVENOUS at 17:44

## 2018-01-01 RX ADMIN — INSULIN ASPART 2 UNITS: 100 INJECTION, SOLUTION INTRAVENOUS; SUBCUTANEOUS at 12:49

## 2018-01-01 RX ADMIN — SODIUM CHLORIDE: 9 INJECTION, SOLUTION INTRAVENOUS at 12:05

## 2018-01-01 RX ADMIN — APIXABAN 2.5 MG: 2.5 TABLET, FILM COATED ORAL at 08:39

## 2018-01-01 RX ADMIN — SODIUM CHLORIDE 500 ML: 9 INJECTION, SOLUTION INTRAVENOUS at 12:31

## 2018-01-01 RX ADMIN — POTASSIUM CHLORIDE 20 MEQ: 1.5 POWDER, FOR SOLUTION ORAL at 09:17

## 2018-01-01 RX ADMIN — DEXAMETHASONE SODIUM PHOSPHATE 4 MG: 4 INJECTION, SOLUTION INTRA-ARTICULAR; INTRALESIONAL; INTRAMUSCULAR; INTRAVENOUS; SOFT TISSUE at 08:46

## 2018-01-01 RX ADMIN — APIXABAN 2.5 MG: 2.5 TABLET, FILM COATED ORAL at 20:28

## 2018-01-01 RX ADMIN — Medication 0.3 MG: at 13:39

## 2018-01-01 RX ADMIN — ACETAMINOPHEN 975 MG: 325 TABLET, FILM COATED ORAL at 06:18

## 2018-01-01 RX ADMIN — PIPERACILLIN AND TAZOBACTAM 2.25 G: 2; .25 INJECTION, POWDER, FOR SOLUTION INTRAVENOUS at 12:34

## 2018-01-01 RX ADMIN — METOPROLOL TARTRATE 25 MG: 25 TABLET ORAL at 08:57

## 2018-01-01 RX ADMIN — DOXYCYCLINE HYCLATE 100 MG: 100 CAPSULE, GELATIN COATED ORAL at 10:49

## 2018-01-01 RX ADMIN — MIDODRINE HYDROCHLORIDE 2.5 MG: 2.5 TABLET ORAL at 13:12

## 2018-01-01 RX ADMIN — Medication 0.1 MG: at 11:22

## 2018-01-01 RX ADMIN — ACETAMINOPHEN 650 MG: 325 TABLET, FILM COATED ORAL at 05:14

## 2018-01-01 RX ADMIN — OXYCODONE HYDROCHLORIDE 5 MG: 5 TABLET ORAL at 21:47

## 2018-01-01 RX ADMIN — LIDOCAINE HYDROCHLORIDE 0.3 ML: 10 INJECTION, SOLUTION EPIDURAL; INFILTRATION; INTRACAUDAL; PERINEURAL at 12:52

## 2018-01-01 RX ADMIN — SIMVASTATIN 10 MG: 10 TABLET, FILM COATED ORAL at 21:38

## 2018-01-01 RX ADMIN — OXYCODONE HYDROCHLORIDE 5 MG: 5 TABLET ORAL at 16:29

## 2018-01-01 RX ADMIN — METOPROLOL TARTRATE 25 MG: 25 TABLET ORAL at 09:59

## 2018-01-01 RX ADMIN — INSULIN ASPART 1 UNITS: 100 INJECTION, SOLUTION INTRAVENOUS; SUBCUTANEOUS at 18:18

## 2018-01-01 RX ADMIN — OXYCODONE HYDROCHLORIDE 5 MG: 5 TABLET ORAL at 00:23

## 2018-01-01 RX ADMIN — LISINOPRIL 10 MG: 10 TABLET ORAL at 12:05

## 2018-01-01 RX ADMIN — SIMVASTATIN 10 MG: 10 TABLET, FILM COATED ORAL at 22:59

## 2018-01-01 RX ADMIN — SENNOSIDES AND DOCUSATE SODIUM 2 TABLET: 8.6; 5 TABLET ORAL at 08:13

## 2018-01-01 RX ADMIN — SENNOSIDES AND DOCUSATE SODIUM 2 TABLET: 8.6; 5 TABLET ORAL at 20:16

## 2018-01-01 RX ADMIN — OXYCODONE HYDROCHLORIDE 5 MG: 5 TABLET ORAL at 01:35

## 2018-01-01 RX ADMIN — RANITIDINE 150 MG: 150 TABLET ORAL at 08:50

## 2018-01-01 RX ADMIN — FENTANYL CITRATE 25 MCG: 50 INJECTION, SOLUTION INTRAMUSCULAR; INTRAVENOUS at 14:10

## 2018-01-01 RX ADMIN — ALBUMIN (HUMAN): 12.5 SOLUTION INTRAVENOUS at 09:38

## 2018-01-01 RX ADMIN — MAGNESIUM HYDROXIDE 30 ML: 400 SUSPENSION ORAL at 16:06

## 2018-01-01 RX ADMIN — EPINEPHRINE 100 ML: 1 INJECTION INTRAMUSCULAR; INTRAVENOUS; SUBCUTANEOUS at 08:30

## 2018-01-01 RX ADMIN — OXYCODONE HYDROCHLORIDE 5 MG: 5 TABLET ORAL at 13:00

## 2018-01-01 RX ADMIN — HEPARIN SODIUM 5000 UNITS: 5000 INJECTION, SOLUTION INTRAVENOUS; SUBCUTANEOUS at 21:01

## 2018-01-01 RX ADMIN — CLINDAMYCIN HYDROCHLORIDE 300 MG: 300 CAPSULE ORAL at 17:50

## 2018-01-01 RX ADMIN — IRON SUCROSE 300 MG: 20 INJECTION, SOLUTION INTRAVENOUS at 07:58

## 2018-01-01 RX ADMIN — INSULIN ASPART 1 UNITS: 100 INJECTION, SOLUTION INTRAVENOUS; SUBCUTANEOUS at 12:47

## 2018-01-01 RX ADMIN — Medication 10 MG: at 16:31

## 2018-01-01 RX ADMIN — NYSTATIN 500000 UNITS: 100000 SUSPENSION ORAL at 08:48

## 2018-01-01 RX ADMIN — ALBUTEROL SULFATE 2.5 MG: 2.5 SOLUTION RESPIRATORY (INHALATION) at 15:56

## 2018-01-01 RX ADMIN — ALBUTEROL SULFATE 2.5 MG: 2.5 SOLUTION RESPIRATORY (INHALATION) at 19:37

## 2018-01-01 RX ADMIN — OXYCODONE HYDROCHLORIDE 5 MG: 5 TABLET ORAL at 12:28

## 2018-01-01 RX ADMIN — METOPROLOL TARTRATE 25 MG: 25 TABLET ORAL at 21:38

## 2018-01-01 RX ADMIN — PIPERACILLIN AND TAZOBACTAM 2.25 G: 2; .25 INJECTION, POWDER, FOR SOLUTION INTRAVENOUS at 04:52

## 2018-01-01 RX ADMIN — METOPROLOL TARTRATE 25 MG: 25 TABLET ORAL at 08:19

## 2018-01-01 RX ADMIN — INSULIN ASPART 1 UNITS: 100 INJECTION, SOLUTION INTRAVENOUS; SUBCUTANEOUS at 13:34

## 2018-01-01 RX ADMIN — SUCRALFATE 1 G: 1 SUSPENSION ORAL at 20:26

## 2018-01-01 RX ADMIN — ACETAMINOPHEN 975 MG: 325 TABLET, FILM COATED ORAL at 18:47

## 2018-01-01 RX ADMIN — SENNOSIDES AND DOCUSATE SODIUM 2 TABLET: 8.6; 5 TABLET ORAL at 08:19

## 2018-01-01 RX ADMIN — OXYCODONE HYDROCHLORIDE 5 MG: 5 TABLET ORAL at 09:38

## 2018-01-01 RX ADMIN — CLINDAMYCIN HYDROCHLORIDE 300 MG: 300 CAPSULE ORAL at 21:33

## 2018-01-01 RX ADMIN — PIPERACILLIN AND TAZOBACTAM 2.25 G: 2; .25 INJECTION, POWDER, FOR SOLUTION INTRAVENOUS at 17:53

## 2018-01-01 RX ADMIN — LIDOCAINE HYDROCHLORIDE 50 MG: 20 INJECTION, SOLUTION INFILTRATION; PERINEURAL at 13:43

## 2018-01-01 RX ADMIN — GUAIFENESIN 600 MG: 600 TABLET, EXTENDED RELEASE ORAL at 21:42

## 2018-01-01 RX ADMIN — SODIUM CHLORIDE, POTASSIUM CHLORIDE, SODIUM LACTATE AND CALCIUM CHLORIDE: 600; 310; 30; 20 INJECTION, SOLUTION INTRAVENOUS at 04:52

## 2018-01-01 RX ADMIN — SODIUM CHLORIDE, POTASSIUM CHLORIDE, SODIUM LACTATE AND CALCIUM CHLORIDE: 600; 310; 30; 20 INJECTION, SOLUTION INTRAVENOUS at 13:32

## 2018-01-01 RX ADMIN — CLINDAMYCIN PHOSPHATE 900 MG: 18 INJECTION, SOLUTION INTRAVENOUS at 08:12

## 2018-01-01 RX ADMIN — SENNOSIDES AND DOCUSATE SODIUM 2 TABLET: 8.6; 5 TABLET ORAL at 22:11

## 2018-01-01 RX ADMIN — LISINOPRIL 10 MG: 10 TABLET ORAL at 08:57

## 2018-01-01 RX ADMIN — PANTOPRAZOLE SODIUM 40 MG: 40 INJECTION, POWDER, FOR SOLUTION INTRAVENOUS at 08:14

## 2018-01-01 RX ADMIN — GLIPIZIDE 5 MG: 5 TABLET ORAL at 08:50

## 2018-01-01 RX ADMIN — ACETAMINOPHEN 975 MG: 325 TABLET, FILM COATED ORAL at 10:45

## 2018-01-01 RX ADMIN — ACETAMINOPHEN 975 MG: 325 TABLET, FILM COATED ORAL at 11:56

## 2018-01-01 RX ADMIN — PREGABALIN 75 MG: 25 CAPSULE ORAL at 20:18

## 2018-01-01 RX ADMIN — ASPIRIN 81 MG 81 MG: 81 TABLET ORAL at 16:30

## 2018-01-01 RX ADMIN — CLINDAMYCIN IN 5 PERCENT DEXTROSE 900 MG: 18 INJECTION, SOLUTION INTRAVENOUS at 22:13

## 2018-01-01 RX ADMIN — BISACODYL 10 MG: 10 SUPPOSITORY RECTAL at 01:40

## 2018-01-01 RX ADMIN — SENNOSIDES AND DOCUSATE SODIUM 2 TABLET: 8.6; 5 TABLET ORAL at 08:01

## 2018-01-01 RX ADMIN — ALBUTEROL SULFATE 2.5 MG: 2.5 SOLUTION RESPIRATORY (INHALATION) at 15:44

## 2018-01-01 RX ADMIN — SIMVASTATIN 10 MG: 10 TABLET, FILM COATED ORAL at 22:06

## 2018-01-01 RX ADMIN — PIPERACILLIN AND TAZOBACTAM 2.25 G: 2; .25 INJECTION, POWDER, FOR SOLUTION INTRAVENOUS at 18:46

## 2018-01-01 RX ADMIN — ACETAMINOPHEN 975 MG: 325 TABLET, FILM COATED ORAL at 21:38

## 2018-01-01 RX ADMIN — TORSEMIDE 20 MG: 20 TABLET ORAL at 08:19

## 2018-01-01 RX ADMIN — GUAIFENESIN 600 MG: 600 TABLET, EXTENDED RELEASE ORAL at 21:01

## 2018-01-01 RX ADMIN — APIXABAN 2.5 MG: 2.5 TABLET, FILM COATED ORAL at 22:05

## 2018-01-01 RX ADMIN — TRAMADOL HYDROCHLORIDE 50 MG: 50 TABLET, COATED ORAL at 17:47

## 2018-01-01 RX ADMIN — LEVOFLOXACIN 500 MG: 500 TABLET, FILM COATED ORAL at 12:05

## 2018-01-01 RX ADMIN — SODIUM CHLORIDE, POTASSIUM CHLORIDE, SODIUM LACTATE AND CALCIUM CHLORIDE: 600; 310; 30; 20 INJECTION, SOLUTION INTRAVENOUS at 09:30

## 2018-01-01 RX ADMIN — ALBUTEROL SULFATE 2 PUFF: 90 AEROSOL, METERED RESPIRATORY (INHALATION) at 08:56

## 2018-01-01 RX ADMIN — ACETAMINOPHEN 975 MG: 325 TABLET, FILM COATED ORAL at 16:06

## 2018-01-01 RX ADMIN — SENNOSIDES AND DOCUSATE SODIUM 2 TABLET: 8.6; 5 TABLET ORAL at 08:48

## 2018-01-01 RX ADMIN — PHENYLEPHRINE HYDROCHLORIDE 100 MCG: 10 INJECTION, SOLUTION INTRAMUSCULAR; INTRAVENOUS; SUBCUTANEOUS at 09:34

## 2018-01-01 RX ADMIN — INSULIN ASPART 1 UNITS: 100 INJECTION, SOLUTION INTRAVENOUS; SUBCUTANEOUS at 14:24

## 2018-01-01 RX ADMIN — GUAIFENESIN 600 MG: 600 TABLET, EXTENDED RELEASE ORAL at 08:56

## 2018-01-01 RX ADMIN — POTASSIUM CHLORIDE 20 MEQ: 1.5 POWDER, FOR SOLUTION ORAL at 08:19

## 2018-01-01 RX ADMIN — PIPERACILLIN AND TAZOBACTAM 2.25 G: 2; .25 INJECTION, POWDER, FOR SOLUTION INTRAVENOUS at 12:24

## 2018-01-01 RX ADMIN — METOPROLOL TARTRATE 25 MG: 25 TABLET ORAL at 07:52

## 2018-01-01 RX ADMIN — TRANEXAMIC ACID 1 G: 100 INJECTION, SOLUTION INTRAVENOUS at 09:32

## 2018-01-01 RX ADMIN — HYDROMORPHONE HYDROCHLORIDE 0.5 MG: 1 INJECTION, SOLUTION INTRAMUSCULAR; INTRAVENOUS; SUBCUTANEOUS at 15:45

## 2018-01-01 RX ADMIN — TRAMADOL HYDROCHLORIDE 50 MG: 50 TABLET, COATED ORAL at 08:25

## 2018-01-01 RX ADMIN — CLINDAMYCIN HYDROCHLORIDE 300 MG: 300 CAPSULE ORAL at 16:05

## 2018-01-01 RX ADMIN — ONDANSETRON 4 MG: 2 INJECTION INTRAMUSCULAR; INTRAVENOUS at 09:34

## 2018-01-01 RX ADMIN — SUCRALFATE 1 G: 1 SUSPENSION ORAL at 16:42

## 2018-01-01 RX ADMIN — UMECLIDINIUM 1 PUFF: 62.5 AEROSOL, POWDER ORAL at 20:17

## 2018-01-01 RX ADMIN — SENNOSIDES AND DOCUSATE SODIUM 2 TABLET: 8.6; 5 TABLET ORAL at 10:00

## 2018-01-01 RX ADMIN — METOPROLOL TARTRATE 25 MG: 25 TABLET ORAL at 07:46

## 2018-01-01 RX ADMIN — GUAIFENESIN 600 MG: 600 TABLET, EXTENDED RELEASE ORAL at 20:29

## 2018-01-01 RX ADMIN — MIDODRINE HYDROCHLORIDE 5 MG: 5 TABLET ORAL at 10:46

## 2018-01-01 RX ADMIN — CEFUROXIME AXETIL 500 MG: 500 TABLET ORAL at 13:53

## 2018-01-01 RX ADMIN — TRAMADOL HYDROCHLORIDE 50 MG: 50 TABLET, COATED ORAL at 00:51

## 2018-01-01 RX ADMIN — TRAMADOL HYDROCHLORIDE 50 MG: 50 TABLET, COATED ORAL at 12:19

## 2018-01-01 RX ADMIN — HEPARIN SODIUM 5000 UNITS: 5000 INJECTION, SOLUTION INTRAVENOUS; SUBCUTANEOUS at 12:02

## 2018-01-01 RX ADMIN — FUROSEMIDE 20 MG: 10 INJECTION, SOLUTION INTRAVENOUS at 12:32

## 2018-01-01 RX ADMIN — CEFTRIAXONE SODIUM 2 G: 2 INJECTION, POWDER, FOR SOLUTION INTRAMUSCULAR; INTRAVENOUS at 16:34

## 2018-01-01 RX ADMIN — FENTANYL CITRATE 25 MCG: 50 INJECTION, SOLUTION INTRAMUSCULAR; INTRAVENOUS at 08:53

## 2018-01-01 RX ADMIN — PIPERACILLIN AND TAZOBACTAM 2.25 G: 2; .25 INJECTION, POWDER, FOR SOLUTION INTRAVENOUS at 22:59

## 2018-01-01 RX ADMIN — SODIUM CHLORIDE, POTASSIUM CHLORIDE, SODIUM LACTATE AND CALCIUM CHLORIDE: 600; 310; 30; 20 INJECTION, SOLUTION INTRAVENOUS at 17:26

## 2018-01-01 RX ADMIN — OXYCODONE HYDROCHLORIDE 5 MG: 5 TABLET ORAL at 22:41

## 2018-01-01 RX ADMIN — SENNOSIDES AND DOCUSATE SODIUM 2 TABLET: 8.6; 5 TABLET ORAL at 08:56

## 2018-01-01 RX ADMIN — ALBUTEROL SULFATE 2.5 MG: 2.5 SOLUTION RESPIRATORY (INHALATION) at 21:09

## 2018-01-01 RX ADMIN — ALBUTEROL SULFATE 2.5 MG: 2.5 SOLUTION RESPIRATORY (INHALATION) at 20:01

## 2018-01-01 RX ADMIN — ALBUTEROL SULFATE 2.5 MG: 2.5 SOLUTION RESPIRATORY (INHALATION) at 12:10

## 2018-01-01 RX ADMIN — FLUTICASONE PROPIONATE 2 SPRAY: 50 SPRAY, METERED NASAL at 22:06

## 2018-01-01 RX ADMIN — ALBUTEROL SULFATE 2.5 MG: 2.5 SOLUTION RESPIRATORY (INHALATION) at 11:28

## 2018-01-01 RX ADMIN — GENTAMICIN SULFATE 1000 ML: 40 INJECTION, SOLUTION INTRAMUSCULAR; INTRAVENOUS at 09:30

## 2018-01-01 RX ADMIN — RANITIDINE 150 MG: 150 TABLET ORAL at 21:33

## 2018-01-01 RX ADMIN — SODIUM CHLORIDE: 9 INJECTION, SOLUTION INTRAVENOUS at 05:16

## 2018-01-01 RX ADMIN — SENNOSIDES AND DOCUSATE SODIUM 2 TABLET: 8.6; 5 TABLET ORAL at 07:51

## 2018-01-01 RX ADMIN — UMECLIDINIUM 1 PUFF: 62.5 AEROSOL, POWDER ORAL at 20:11

## 2018-01-01 RX ADMIN — PIPERACILLIN AND TAZOBACTAM 2.25 G: 2; .25 INJECTION, POWDER, FOR SOLUTION INTRAVENOUS at 23:02

## 2018-01-01 RX ADMIN — SODIUM CHLORIDE 500 ML: 9 INJECTION, SOLUTION INTRAVENOUS at 17:40

## 2018-01-01 RX ADMIN — METOPROLOL TARTRATE 25 MG: 25 TABLET ORAL at 20:27

## 2018-01-01 RX ADMIN — GUAIFENESIN 600 MG: 600 TABLET, EXTENDED RELEASE ORAL at 10:10

## 2018-01-01 RX ADMIN — NYSTATIN 100000 UNITS: 100000 SUSPENSION ORAL at 13:31

## 2018-01-01 RX ADMIN — PIPERACILLIN AND TAZOBACTAM 2.25 G: 2; .25 INJECTION, POWDER, FOR SOLUTION INTRAVENOUS at 16:55

## 2018-01-01 RX ADMIN — SENNOSIDES AND DOCUSATE SODIUM 2 TABLET: 8.6; 5 TABLET ORAL at 21:43

## 2018-01-01 RX ADMIN — INSULIN ASPART 1 UNITS: 100 INJECTION, SOLUTION INTRAVENOUS; SUBCUTANEOUS at 03:12

## 2018-01-01 RX ADMIN — CLINDAMYCIN HYDROCHLORIDE 300 MG: 300 CAPSULE ORAL at 21:10

## 2018-01-01 RX ADMIN — TORSEMIDE 20 MG: 20 TABLET ORAL at 08:51

## 2018-01-01 RX ADMIN — SODIUM CHLORIDE, POTASSIUM CHLORIDE, SODIUM LACTATE AND CALCIUM CHLORIDE 500 ML: 600; 310; 30; 20 INJECTION, SOLUTION INTRAVENOUS at 16:08

## 2018-01-01 RX ADMIN — ONDANSETRON 4 MG: 2 INJECTION INTRAMUSCULAR; INTRAVENOUS at 17:25

## 2018-01-01 RX ADMIN — METOPROLOL TARTRATE 25 MG: 25 TABLET ORAL at 22:10

## 2018-01-01 RX ADMIN — POTASSIUM CHLORIDE 20 MEQ: 1.5 POWDER, FOR SOLUTION ORAL at 08:03

## 2018-01-01 RX ADMIN — RANITIDINE 150 MG: 150 TABLET ORAL at 09:15

## 2018-01-01 RX ADMIN — TORSEMIDE 20 MG: 20 TABLET ORAL at 08:01

## 2018-01-01 RX ADMIN — OXYCODONE HYDROCHLORIDE 5 MG: 5 TABLET ORAL at 22:17

## 2018-01-01 RX ADMIN — FENTANYL CITRATE 25 MCG: 50 INJECTION, SOLUTION INTRAMUSCULAR; INTRAVENOUS at 07:53

## 2018-01-01 RX ADMIN — CLINDAMYCIN PHOSPHATE 900 MG: 18 INJECTION, SOLUTION INTRAVENOUS at 13:48

## 2018-01-01 RX ADMIN — PROPOFOL 50 MG: 10 INJECTION, EMULSION INTRAVENOUS at 14:43

## 2018-01-01 RX ADMIN — SIMVASTATIN 10 MG: 10 TABLET, FILM COATED ORAL at 21:10

## 2018-01-01 RX ADMIN — ASPIRIN 81 MG: 81 TABLET, COATED ORAL at 08:47

## 2018-01-01 RX ADMIN — LEVOFLOXACIN 250 MG: 250 TABLET, FILM COATED ORAL at 08:56

## 2018-01-01 RX ADMIN — APIXABAN 2.5 MG: 2.5 TABLET, FILM COATED ORAL at 20:26

## 2018-01-01 RX ADMIN — GUAIFENESIN 600 MG: 600 TABLET, EXTENDED RELEASE ORAL at 08:47

## 2018-01-01 RX ADMIN — TORSEMIDE 20 MG: 20 TABLET ORAL at 07:51

## 2018-01-01 RX ADMIN — OXYCODONE HYDROCHLORIDE 5 MG: 5 TABLET ORAL at 08:48

## 2018-01-01 RX ADMIN — SUCRALFATE 1 G: 1 SUSPENSION ORAL at 12:37

## 2018-01-01 RX ADMIN — MINOCYCLINE HYDROCHLORIDE 100 MG: 100 CAPSULE ORAL at 11:05

## 2018-01-01 RX ADMIN — INSULIN ASPART 1 UNITS: 100 INJECTION, SOLUTION INTRAVENOUS; SUBCUTANEOUS at 16:53

## 2018-01-01 RX ADMIN — GLIPIZIDE 5 MG: 5 TABLET ORAL at 08:39

## 2018-01-01 RX ADMIN — METOPROLOL TARTRATE 25 MG: 25 TABLET ORAL at 22:05

## 2018-01-01 RX ADMIN — CLINDAMYCIN HYDROCHLORIDE 300 MG: 300 CAPSULE ORAL at 21:42

## 2018-01-01 RX ADMIN — ALBUTEROL SULFATE 2.5 MG: 2.5 SOLUTION RESPIRATORY (INHALATION) at 15:48

## 2018-01-01 RX ADMIN — OXYCODONE HYDROCHLORIDE 5 MG: 5 TABLET ORAL at 16:26

## 2018-01-01 RX ADMIN — SUCRALFATE 1 G: 1 SUSPENSION ORAL at 12:05

## 2018-01-01 RX ADMIN — SIMVASTATIN 10 MG: 10 TABLET, FILM COATED ORAL at 21:33

## 2018-01-01 RX ADMIN — Medication 0.5 MG: at 03:08

## 2018-01-01 RX ADMIN — METOPROLOL TARTRATE 25 MG: 25 TABLET ORAL at 20:35

## 2018-01-01 ASSESSMENT — ACTIVITIES OF DAILY LIVING (ADL)
ADLS_ACUITY_SCORE: 16
ADLS_ACUITY_SCORE: 12
ADLS_ACUITY_SCORE: 17
PREVIOUS_RESPONSIBILITIES: HOUSEKEEPING;LAUNDRY;MEDICATION MANAGEMENT
ADLS_ACUITY_SCORE: 17
ADLS_ACUITY_SCORE: 17
ADLS_ACUITY_SCORE: 16
ADLS_ACUITY_SCORE: 18
ADLS_ACUITY_SCORE: 19
ADLS_ACUITY_SCORE: 20
ADLS_ACUITY_SCORE: 20
ADLS_ACUITY_SCORE: 17
ADLS_ACUITY_SCORE: 19
ADLS_ACUITY_SCORE: 17
ADLS_ACUITY_SCORE: 19
ADLS_ACUITY_SCORE: 18
ADLS_ACUITY_SCORE: 12
ADLS_ACUITY_SCORE: 20
ADLS_ACUITY_SCORE: 15
ADLS_ACUITY_SCORE: 18
ADLS_ACUITY_SCORE: 17
ADLS_ACUITY_SCORE: 17
ADLS_ACUITY_SCORE: 15
ADLS_ACUITY_SCORE: 17
ADLS_ACUITY_SCORE: 21
ADLS_ACUITY_SCORE: 17
ADLS_ACUITY_SCORE: 15
ADLS_ACUITY_SCORE: 15
ADLS_ACUITY_SCORE: 17
ADLS_ACUITY_SCORE: 18
ADLS_ACUITY_SCORE: 18
ADLS_ACUITY_SCORE: 15
ADLS_ACUITY_SCORE: 11
ADLS_ACUITY_SCORE: 15
ADLS_ACUITY_SCORE: 19
ADLS_ACUITY_SCORE: 17
ADLS_ACUITY_SCORE: 19
ADLS_ACUITY_SCORE: 15
ADLS_ACUITY_SCORE: 15
ADLS_ACUITY_SCORE: 17
ADLS_ACUITY_SCORE: 15
ADLS_ACUITY_SCORE: 11
ADLS_ACUITY_SCORE: 15
ADLS_ACUITY_SCORE: 20
ADLS_ACUITY_SCORE: 15
ADLS_ACUITY_SCORE: 18
ADLS_ACUITY_SCORE: 15
ADLS_ACUITY_SCORE: 19
ADLS_ACUITY_SCORE: 11
ADLS_ACUITY_SCORE: 16
ADLS_ACUITY_SCORE: 19
ADLS_ACUITY_SCORE: 15
ADLS_ACUITY_SCORE: 18
ADLS_ACUITY_SCORE: 15
ADLS_ACUITY_SCORE: 16
ADLS_ACUITY_SCORE: 19
ADLS_ACUITY_SCORE: 15
ADLS_ACUITY_SCORE: 16
ADLS_ACUITY_SCORE: 15
ADLS_ACUITY_SCORE: 18
ADLS_ACUITY_SCORE: 16
ADLS_ACUITY_SCORE: 16
ADLS_ACUITY_SCORE: 12
ADLS_ACUITY_SCORE: 15
ADLS_ACUITY_SCORE: 19
ADLS_ACUITY_SCORE: 17
ADLS_ACUITY_SCORE: 20
ADLS_ACUITY_SCORE: 15
ADLS_ACUITY_SCORE: 17
ADLS_ACUITY_SCORE: 20
ADLS_ACUITY_SCORE: 18
ADLS_ACUITY_SCORE: 16
ADLS_ACUITY_SCORE: 15
ADLS_ACUITY_SCORE: 20
ADLS_ACUITY_SCORE: 15
ADLS_ACUITY_SCORE: 15
ADLS_ACUITY_SCORE: 16
ADLS_ACUITY_SCORE: 15

## 2018-01-01 ASSESSMENT — ENCOUNTER SYMPTOMS
WEAKNESS: 1
SHORTNESS OF BREATH: 1
DYSURIA: 0
FEVER: 0
DIARRHEA: 1
NAUSEA: 1
CHILLS: 0
COUGH: 0
VOMITING: 0
ABDOMINAL PAIN: 1
VOMITING: 0
ABDOMINAL PAIN: 0
DIARRHEA: 1
FATIGUE: 1
DIARRHEA: 0
NAUSEA: 1
CHILLS: 0
BLOOD IN STOOL: 0
BLOOD IN STOOL: 0
FEVER: 0
NECK PAIN: 0
FEVER: 0
WOUND: 1
ABDOMINAL PAIN: 0
APPETITE CHANGE: 1
ABDOMINAL DISTENTION: 0
DIARRHEA: 1
APPETITE CHANGE: 1
ARTHRALGIAS: 1
DYSRHYTHMIAS: 1

## 2018-01-01 ASSESSMENT — LIFESTYLE VARIABLES
TOBACCO_USE: 1
TOBACCO_USE: 1

## 2018-01-01 ASSESSMENT — COPD QUESTIONNAIRES
COPD: 1
COPD: 1

## 2018-01-01 ASSESSMENT — PAIN DESCRIPTION - DESCRIPTORS
DESCRIPTORS: ACHING

## 2018-01-01 ASSESSMENT — MIFFLIN-ST. JEOR: SCORE: 1286.89

## 2018-01-04 ENCOUNTER — APPOINTMENT (OUTPATIENT)
Dept: INTERVENTIONAL RADIOLOGY/VASCULAR | Facility: CLINIC | Age: 83
End: 2018-01-04
Attending: SURGERY
Payer: MEDICARE

## 2018-01-04 ENCOUNTER — OFFICE VISIT (OUTPATIENT)
Dept: SURGERY | Facility: PHYSICIAN GROUP | Age: 83
End: 2018-01-04
Payer: MEDICARE

## 2018-01-04 ENCOUNTER — HOSPITAL ENCOUNTER (OUTPATIENT)
Facility: CLINIC | Age: 83
Setting detail: OBSERVATION
Discharge: HOME | End: 2018-01-05
Attending: SURGERY | Admitting: SURGERY
Payer: MEDICARE

## 2018-01-04 DIAGNOSIS — I70.212 ATHEROSCLEROSIS OF NATIVE ARTERY OF LEFT LOWER EXTREMITY WITH INTERMITTENT CLAUDICATION (H): ICD-10-CM

## 2018-01-04 PROBLEM — S30.1XXA GROIN HEMATOMA, INITIAL ENCOUNTER: Status: ACTIVE | Noted: 2018-01-04

## 2018-01-04 PROBLEM — S30.1XXA HEMATOMA OF GROIN: Status: ACTIVE | Noted: 2018-01-04

## 2018-01-04 LAB
APTT PPP: 31 SEC (ref 22–37)
CHOLEST SERPL-MCNC: 123 MG/DL
CREAT SERPL-MCNC: 1.2 MG/DL (ref 0.52–1.04)
ERYTHROCYTE [DISTWIDTH] IN BLOOD BY AUTOMATED COUNT: 15.1 % (ref 10–15)
GFR SERPL CREATININE-BSD FRML MDRD: 43 ML/MIN/1.7M2
GLUCOSE BLDC GLUCOMTR-MCNC: 132 MG/DL (ref 70–99)
GLUCOSE BLDC GLUCOMTR-MCNC: 137 MG/DL (ref 70–99)
HBA1C MFR BLD: 6.6 % (ref 4.3–6)
HCT VFR BLD AUTO: 38 % (ref 35–47)
HDLC SERPL-MCNC: 73 MG/DL
HGB BLD-MCNC: 12.6 G/DL (ref 11.7–15.7)
INR PPP: 0.97 (ref 0.86–1.14)
KCT BLD-ACNC: 260 SEC (ref 105–167)
LDLC SERPL CALC-MCNC: 36 MG/DL
MCH RBC QN AUTO: 30.6 PG (ref 26.5–33)
MCHC RBC AUTO-ENTMCNC: 33.2 G/DL (ref 31.5–36.5)
MCV RBC AUTO: 92 FL (ref 78–100)
NONHDLC SERPL-MCNC: 50 MG/DL
PLATELET # BLD AUTO: 223 10E9/L (ref 150–450)
RBC # BLD AUTO: 4.12 10E12/L (ref 3.8–5.2)
TRIGL SERPL-MCNC: 70 MG/DL
WBC # BLD AUTO: 6.5 10E9/L (ref 4–11)

## 2018-01-04 PROCEDURE — 85610 PROTHROMBIN TIME: CPT | Performed by: SURGERY

## 2018-01-04 PROCEDURE — 27210998 ZZH ACCESS HEART CATH CR6

## 2018-01-04 PROCEDURE — 25000125 ZZHC RX 250: Performed by: SURGERY

## 2018-01-04 PROCEDURE — 99152 MOD SED SAME PHYS/QHP 5/>YRS: CPT | Performed by: SURGERY

## 2018-01-04 PROCEDURE — C1769 GUIDE WIRE: HCPCS

## 2018-01-04 PROCEDURE — 27210742 ZZH CATH CR1

## 2018-01-04 PROCEDURE — 27210906 ZZH KIT CR8

## 2018-01-04 PROCEDURE — 83036 HEMOGLOBIN GLYCOSYLATED A1C: CPT | Performed by: SURGERY

## 2018-01-04 PROCEDURE — C1887 CATHETER, GUIDING: HCPCS

## 2018-01-04 PROCEDURE — 82565 ASSAY OF CREATININE: CPT | Performed by: SURGERY

## 2018-01-04 PROCEDURE — 99153 MOD SED SAME PHYS/QHP EA: CPT | Performed by: SURGERY

## 2018-01-04 PROCEDURE — 37226 ZZHC REVASCULARIZE FEM/POP ARTERY, ANGIOPLASTY/STENT: CPT

## 2018-01-04 PROCEDURE — 27210845 ZZH DEVICE INFLATION CR5

## 2018-01-04 PROCEDURE — 00000146 ZZHCL STATISTIC GLUCOSE BY METER IP

## 2018-01-04 PROCEDURE — 85027 COMPLETE CBC AUTOMATED: CPT | Performed by: SURGERY

## 2018-01-04 PROCEDURE — 85347 COAGULATION TIME ACTIVATED: CPT

## 2018-01-04 PROCEDURE — 27210808 ZZH SHEATH CR7

## 2018-01-04 PROCEDURE — C1725 CATH, TRANSLUMIN NON-LASER: HCPCS

## 2018-01-04 PROCEDURE — 27210886 ZZH ACCESSORY CR5

## 2018-01-04 PROCEDURE — 25000128 H RX IP 250 OP 636: Performed by: SURGERY

## 2018-01-04 PROCEDURE — 75710 ARTERY X-RAYS ARM/LEG: CPT | Mod: 26 | Performed by: SURGERY

## 2018-01-04 PROCEDURE — G0378 HOSPITAL OBSERVATION PER HR: HCPCS

## 2018-01-04 PROCEDURE — 40000853 ZZH STATISTIC ANGIOGRAM, STENT, VERTEBRO PLASTY

## 2018-01-04 PROCEDURE — A9270 NON-COVERED ITEM OR SERVICE: HCPCS | Mod: GY | Performed by: SURGERY

## 2018-01-04 PROCEDURE — 36415 COLL VENOUS BLD VENIPUNCTURE: CPT | Performed by: SURGERY

## 2018-01-04 PROCEDURE — 85730 THROMBOPLASTIN TIME PARTIAL: CPT | Performed by: SURGERY

## 2018-01-04 PROCEDURE — 37226 ZZHC REVASCULARIZE FEM/POP ARTERY, ANGIOPLASTY/STENT: CPT | Mod: LT | Performed by: SURGERY

## 2018-01-04 PROCEDURE — C1760 CLOSURE DEV, VASC: HCPCS

## 2018-01-04 PROCEDURE — 80061 LIPID PANEL: CPT | Performed by: SURGERY

## 2018-01-04 PROCEDURE — 25000132 ZZH RX MED GY IP 250 OP 250 PS 637: Mod: GY | Performed by: SURGERY

## 2018-01-04 RX ORDER — HEPARIN SODIUM 1000 [USP'U]/ML
INJECTION, SOLUTION INTRAVENOUS; SUBCUTANEOUS
Status: DISCONTINUED
Start: 2018-01-04 | End: 2018-01-04 | Stop reason: HOSPADM

## 2018-01-04 RX ORDER — SODIUM CHLORIDE 9 MG/ML
INJECTION, SOLUTION INTRAVENOUS CONTINUOUS
Status: DISCONTINUED | OUTPATIENT
Start: 2018-01-04 | End: 2018-01-04

## 2018-01-04 RX ORDER — CLINDAMYCIN HCL 300 MG
300 CAPSULE ORAL 2 TIMES DAILY
Status: DISCONTINUED | OUTPATIENT
Start: 2018-01-04 | End: 2018-01-04

## 2018-01-04 RX ORDER — PROTAMINE SULFATE 10 MG/ML
50 INJECTION, SOLUTION INTRAVENOUS ONCE
Status: COMPLETED | OUTPATIENT
Start: 2018-01-04 | End: 2018-01-04

## 2018-01-04 RX ORDER — GLIPIZIDE 5 MG/1
5 TABLET ORAL
Status: DISCONTINUED | OUTPATIENT
Start: 2018-01-05 | End: 2018-01-05 | Stop reason: HOSPADM

## 2018-01-04 RX ORDER — HYDROCODONE BITARTRATE AND ACETAMINOPHEN 5; 325 MG/1; MG/1
1 TABLET ORAL EVERY 6 HOURS PRN
Status: DISCONTINUED | OUTPATIENT
Start: 2018-01-04 | End: 2018-01-05 | Stop reason: HOSPADM

## 2018-01-04 RX ORDER — PROTAMINE SULFATE 10 MG/ML
INJECTION, SOLUTION INTRAVENOUS
Status: DISCONTINUED
Start: 2018-01-04 | End: 2018-01-04 | Stop reason: HOSPADM

## 2018-01-04 RX ORDER — HEPARIN SODIUM 1000 [USP'U]/ML
500-6000 INJECTION, SOLUTION INTRAVENOUS; SUBCUTANEOUS
Status: COMPLETED | OUTPATIENT
Start: 2018-01-04 | End: 2018-01-04

## 2018-01-04 RX ORDER — IODIXANOL 320 MG/ML
50 INJECTION, SOLUTION INTRAVASCULAR ONCE
Status: COMPLETED | OUTPATIENT
Start: 2018-01-04 | End: 2018-01-04

## 2018-01-04 RX ORDER — FUROSEMIDE 20 MG
20 TABLET ORAL EVERY MORNING
Status: DISCONTINUED | OUTPATIENT
Start: 2018-01-05 | End: 2018-01-05 | Stop reason: HOSPADM

## 2018-01-04 RX ORDER — GABAPENTIN 300 MG/1
300 CAPSULE ORAL 2 TIMES DAILY
Status: DISCONTINUED | OUTPATIENT
Start: 2018-01-04 | End: 2018-01-05 | Stop reason: HOSPADM

## 2018-01-04 RX ORDER — FLUTICASONE PROPIONATE 50 MCG
1 SPRAY, SUSPENSION (ML) NASAL AT BEDTIME
Status: DISCONTINUED | OUTPATIENT
Start: 2018-01-04 | End: 2018-01-05 | Stop reason: HOSPADM

## 2018-01-04 RX ORDER — HYDROXYZINE PAMOATE 25 MG/1
25 CAPSULE ORAL 2 TIMES DAILY PRN
Status: DISCONTINUED | OUTPATIENT
Start: 2018-01-04 | End: 2018-01-05 | Stop reason: HOSPADM

## 2018-01-04 RX ORDER — ONDANSETRON 2 MG/ML
4 INJECTION INTRAMUSCULAR; INTRAVENOUS EVERY 6 HOURS PRN
Status: DISCONTINUED | OUTPATIENT
Start: 2018-01-04 | End: 2018-01-05 | Stop reason: HOSPADM

## 2018-01-04 RX ORDER — TIOTROPIUM BROMIDE 18 UG/1
18 CAPSULE ORAL; RESPIRATORY (INHALATION) AT BEDTIME
Status: DISCONTINUED | OUTPATIENT
Start: 2018-01-04 | End: 2018-01-05 | Stop reason: HOSPADM

## 2018-01-04 RX ORDER — FENTANYL CITRATE 50 UG/ML
INJECTION, SOLUTION INTRAMUSCULAR; INTRAVENOUS
Status: DISCONTINUED
Start: 2018-01-04 | End: 2018-01-04 | Stop reason: HOSPADM

## 2018-01-04 RX ORDER — LIDOCAINE HYDROCHLORIDE 10 MG/ML
INJECTION, SOLUTION INFILTRATION; PERINEURAL
Status: DISCONTINUED
Start: 2018-01-04 | End: 2018-01-04 | Stop reason: HOSPADM

## 2018-01-04 RX ORDER — FENTANYL CITRATE 50 UG/ML
25-50 INJECTION, SOLUTION INTRAMUSCULAR; INTRAVENOUS EVERY 5 MIN PRN
Status: DISCONTINUED | OUTPATIENT
Start: 2018-01-04 | End: 2018-01-04

## 2018-01-04 RX ORDER — LISINOPRIL 10 MG/1
10 TABLET ORAL DAILY
Status: DISCONTINUED | OUTPATIENT
Start: 2018-01-04 | End: 2018-01-05 | Stop reason: HOSPADM

## 2018-01-04 RX ORDER — LIDOCAINE 40 MG/G
CREAM TOPICAL
Status: DISCONTINUED | OUTPATIENT
Start: 2018-01-04 | End: 2018-01-05 | Stop reason: HOSPADM

## 2018-01-04 RX ORDER — GLIPIZIDE 5 MG/1
5 TABLET ORAL
Status: DISCONTINUED | OUTPATIENT
Start: 2018-01-04 | End: 2018-01-04

## 2018-01-04 RX ORDER — PROTAMINE SULFATE 10 MG/ML
INJECTION, SOLUTION INTRAVENOUS
Status: DISCONTINUED
Start: 2018-01-04 | End: 2018-01-04 | Stop reason: WASHOUT

## 2018-01-04 RX ORDER — LIDOCAINE 40 MG/G
CREAM TOPICAL
Status: DISCONTINUED | OUTPATIENT
Start: 2018-01-04 | End: 2018-01-04

## 2018-01-04 RX ORDER — NALOXONE HYDROCHLORIDE 0.4 MG/ML
.1-.4 INJECTION, SOLUTION INTRAMUSCULAR; INTRAVENOUS; SUBCUTANEOUS
Status: DISCONTINUED | OUTPATIENT
Start: 2018-01-04 | End: 2018-01-05 | Stop reason: HOSPADM

## 2018-01-04 RX ORDER — VIT C/E/ZN/COPPR/LUTEIN/ZEAXAN 60 MG-6 MG
1 CAPSULE ORAL DAILY
Status: DISCONTINUED | OUTPATIENT
Start: 2018-01-05 | End: 2018-01-05 | Stop reason: HOSPADM

## 2018-01-04 RX ORDER — NALOXONE HYDROCHLORIDE 0.4 MG/ML
.1-.4 INJECTION, SOLUTION INTRAMUSCULAR; INTRAVENOUS; SUBCUTANEOUS
Status: DISCONTINUED | OUTPATIENT
Start: 2018-01-04 | End: 2018-01-04

## 2018-01-04 RX ORDER — DEXTROSE MONOHYDRATE 25 G/50ML
25-50 INJECTION, SOLUTION INTRAVENOUS
Status: DISCONTINUED | OUTPATIENT
Start: 2018-01-04 | End: 2018-01-05 | Stop reason: HOSPADM

## 2018-01-04 RX ORDER — ALBUTEROL SULFATE 90 UG/1
2 AEROSOL, METERED RESPIRATORY (INHALATION) EVERY 4 HOURS PRN
Status: DISCONTINUED | OUTPATIENT
Start: 2018-01-04 | End: 2018-01-05 | Stop reason: HOSPADM

## 2018-01-04 RX ORDER — HYDROMORPHONE HYDROCHLORIDE 1 MG/ML
1 INJECTION, SOLUTION INTRAMUSCULAR; INTRAVENOUS; SUBCUTANEOUS
Status: DISCONTINUED | OUTPATIENT
Start: 2018-01-04 | End: 2018-01-05 | Stop reason: HOSPADM

## 2018-01-04 RX ORDER — FLUMAZENIL 0.1 MG/ML
0.2 INJECTION, SOLUTION INTRAVENOUS
Status: DISCONTINUED | OUTPATIENT
Start: 2018-01-04 | End: 2018-01-04

## 2018-01-04 RX ORDER — NICOTINE POLACRILEX 4 MG
15-30 LOZENGE BUCCAL
Status: DISCONTINUED | OUTPATIENT
Start: 2018-01-04 | End: 2018-01-05 | Stop reason: HOSPADM

## 2018-01-04 RX ORDER — SIMVASTATIN 10 MG
10 TABLET ORAL AT BEDTIME
Status: DISCONTINUED | OUTPATIENT
Start: 2018-01-04 | End: 2018-01-05 | Stop reason: HOSPADM

## 2018-01-04 RX ORDER — SODIUM CHLORIDE 9 MG/ML
INJECTION, SOLUTION INTRAVENOUS CONTINUOUS
Status: DISCONTINUED | OUTPATIENT
Start: 2018-01-04 | End: 2018-01-05 | Stop reason: HOSPADM

## 2018-01-04 RX ORDER — LIDOCAINE HYDROCHLORIDE 10 MG/ML
1-30 INJECTION, SOLUTION EPIDURAL; INFILTRATION; INTRACAUDAL; PERINEURAL
Status: COMPLETED | OUTPATIENT
Start: 2018-01-04 | End: 2018-01-04

## 2018-01-04 RX ADMIN — MIDAZOLAM HYDROCHLORIDE 0.5 MG: 1 INJECTION, SOLUTION INTRAMUSCULAR; INTRAVENOUS at 14:31

## 2018-01-04 RX ADMIN — MIDAZOLAM HYDROCHLORIDE 0.5 MG: 1 INJECTION, SOLUTION INTRAMUSCULAR; INTRAVENOUS at 14:17

## 2018-01-04 RX ADMIN — MIDAZOLAM HYDROCHLORIDE 0.5 MG: 1 INJECTION, SOLUTION INTRAMUSCULAR; INTRAVENOUS at 15:17

## 2018-01-04 RX ADMIN — FENTANYL CITRATE 25 MCG: 50 INJECTION, SOLUTION INTRAMUSCULAR; INTRAVENOUS at 15:42

## 2018-01-04 RX ADMIN — Medication 0.5 MG: at 22:26

## 2018-01-04 RX ADMIN — SODIUM CHLORIDE: 9 INJECTION, SOLUTION INTRAVENOUS at 10:55

## 2018-01-04 RX ADMIN — HYDROXYZINE PAMOATE 25 MG: 25 CAPSULE ORAL at 18:48

## 2018-01-04 RX ADMIN — Medication 0.5 MG: at 20:01

## 2018-01-04 RX ADMIN — RANITIDINE 150 MG: 150 TABLET ORAL at 22:41

## 2018-01-04 RX ADMIN — GLIPIZIDE 2.5 MG: 5 TABLET ORAL at 18:49

## 2018-01-04 RX ADMIN — LIDOCAINE HYDROCHLORIDE 60 MG: 10 INJECTION, SOLUTION INFILTRATION; PERINEURAL at 13:46

## 2018-01-04 RX ADMIN — FENTANYL CITRATE 25 MCG: 50 INJECTION, SOLUTION INTRAMUSCULAR; INTRAVENOUS at 14:09

## 2018-01-04 RX ADMIN — MIDAZOLAM HYDROCHLORIDE 0.5 MG: 1 INJECTION, SOLUTION INTRAMUSCULAR; INTRAVENOUS at 14:48

## 2018-01-04 RX ADMIN — HEPARIN SODIUM 11000 UNITS: 1000 INJECTION, SOLUTION INTRAVENOUS; SUBCUTANEOUS at 15:00

## 2018-01-04 RX ADMIN — MAGNESIUM HYDROXIDE 30 ML: 400 SUSPENSION ORAL at 22:41

## 2018-01-04 RX ADMIN — Medication 0.5 MG: at 17:01

## 2018-01-04 RX ADMIN — FENTANYL CITRATE 25 MCG: 50 INJECTION, SOLUTION INTRAMUSCULAR; INTRAVENOUS at 14:31

## 2018-01-04 RX ADMIN — FENTANYL CITRATE 25 MCG: 50 INJECTION, SOLUTION INTRAMUSCULAR; INTRAVENOUS at 14:47

## 2018-01-04 RX ADMIN — GABAPENTIN 300 MG: 300 CAPSULE ORAL at 22:41

## 2018-01-04 RX ADMIN — IODIXANOL 65 ML: 320 INJECTION, SOLUTION INTRAVASCULAR at 15:48

## 2018-01-04 RX ADMIN — MIDAZOLAM HYDROCHLORIDE 0.5 MG: 1 INJECTION, SOLUTION INTRAMUSCULAR; INTRAVENOUS at 13:34

## 2018-01-04 RX ADMIN — PROTAMINE SULFATE 50 MG: 10 INJECTION, SOLUTION INTRAVENOUS at 15:32

## 2018-01-04 RX ADMIN — FENTANYL CITRATE 25 MCG: 50 INJECTION, SOLUTION INTRAMUSCULAR; INTRAVENOUS at 15:17

## 2018-01-04 RX ADMIN — FENTANYL CITRATE 50 MCG: 50 INJECTION, SOLUTION INTRAMUSCULAR; INTRAVENOUS at 15:39

## 2018-01-04 RX ADMIN — LISINOPRIL 10 MG: 10 TABLET ORAL at 18:49

## 2018-01-04 RX ADMIN — MIDAZOLAM HYDROCHLORIDE 0.5 MG: 1 INJECTION, SOLUTION INTRAMUSCULAR; INTRAVENOUS at 13:47

## 2018-01-04 RX ADMIN — MIDAZOLAM HYDROCHLORIDE 0.5 MG: 1 INJECTION, SOLUTION INTRAMUSCULAR; INTRAVENOUS at 14:09

## 2018-01-04 RX ADMIN — FENTANYL CITRATE 25 MCG: 50 INJECTION, SOLUTION INTRAMUSCULAR; INTRAVENOUS at 15:46

## 2018-01-04 RX ADMIN — FENTANYL CITRATE 25 MCG: 50 INJECTION, SOLUTION INTRAMUSCULAR; INTRAVENOUS at 13:34

## 2018-01-04 RX ADMIN — FENTANYL CITRATE 25 MCG: 50 INJECTION, SOLUTION INTRAMUSCULAR; INTRAVENOUS at 13:47

## 2018-01-04 RX ADMIN — SIMVASTATIN 10 MG: 10 TABLET, FILM COATED ORAL at 22:41

## 2018-01-04 RX ADMIN — FENTANYL CITRATE 25 MCG: 50 INJECTION, SOLUTION INTRAMUSCULAR; INTRAVENOUS at 14:17

## 2018-01-04 NOTE — IP AVS SNAPSHOT
[facility] [address] [facility]    [address] [place] [address]    Phone:  [phone]                                       After Visit Summary   1/4/2018    Helene Gibbs    MRN: [id number]           After Visit Summary Signature Page     I have received my discharge instructions, and my questions have been answered. I have discussed any challenges I see with this plan with the nurse or doctor.    ..........................................................................................................................................  Patient/Patient Representative Signature      ..........................................................................................................................................  Patient Representative Print Name and Relationship to Patient    ..................................................               ................................................  Date                                            Time    ..........................................................................................................................................  Reviewed by Signature/Title    ...................................................              ..............................................  Date                                                            Time

## 2018-01-04 NOTE — PROGRESS NOTES
Returned from interventional radiology per bed. (R) groin site soft with slight bruising which is was marked, site is tender to palpation, hematoma resolved. VSS. Doppler lower extremities per baseline. Alert and oriented. Enedelia Waetrs at bedside. Radiologist discussed findings with patient and niece. Patient will stay overnight and have ultrasound and possible compression of pseudoanrysm in a.m. Taking fluids. IV infusing at 100cc/hr.

## 2018-01-04 NOTE — PROGRESS NOTES
Interventional Radiology Intra-procedural Nursing Note    Patient Name: Helene Gibbs  Medical Record Number: [id number]  Today's Date: January 4, 2018    Start Time: 1334  End of procedure time: 1534  Procedure: Left lower extremity arteriogram, angioplasty, and stenting.   Report given to: [facility]  Time pt departs:  1600      Other Notes: Successful left lower extremity procedure. VSS. No complications during procedure. Stent placed in left superficial femoral artery. Fentanyl 275mcg and Versed 3.5mg given total.11,000 units Heparin given. . Protamine 50mg  ProGlide suture-mediated closure device used for right groin site. LOT [id number]. Right femoral sheath pulled. Hematoma present. Dressed with quick clot, 4x4 gauze and tegaderm. Hematoma outlined with marker. Dressing CDI. Bedside report given to [facility].     RUBÉN SMALL RN

## 2018-01-04 NOTE — PROGRESS NOTES
Pt was admitted to [facility] for lower extremity angiogram.Dr. Watson was here to consent pt. Pt ambulated with own walker to BR to void pre procedure.

## 2018-01-04 NOTE — IP AVS SNAPSHOT
MRN:[id number]                      After Visit Summary   1/4/2018    Helene Gibbs    MRN: [id number]           Thank you!     Thank you for choosing [facility] for your care. Our goal is always to provide you with excellent care. Hearing back from our patients is one way we can continue to improve our services. Please take a few minutes to complete the written survey that you may receive in the mail after you visit with us. Thank you!        Patient Information     Date Of Birth          1935        Designated Caregiver       Most Recent Value    Caregiver    Will someone help with your care after discharge? no      About your hospital stay     You were admitted on:  January 4, 2018 You last received care in the:  [place] [address] [facility]    You were discharged on:  January 5, 2018        Reason for your hospital stay       Admitted for right groin hematoma after right femoral approach. Did well. Suitable for discharge.                  Who to Call     For medical emergencies, please call 911.  For non-urgent questions about your medical care, please call your primary care provider or clinic, [phone]          Attending Provider     Provider Specialty    Pieter Watson MD Vascular Surgery       Primary Care Provider Office Phone # Fax #    Neisha MARIMAR Esparza [phone]      After Care Instructions     Activity       Your activity upon discharge: Ambulation            Diet       Follow this diet upon discharge                  Follow-up Appointments     Follow-up and recommended labs and tests        Follow up with Pieter Watson in one week                  Your next 10 appointments already scheduled     Jan 09, 2018  1:30 PM CST   Return Visit with  Oncology Nurse   [facility] ([facility])    [facility]  [address]  [place] [address]   [phone]            Jan 16, 2018   2:00 PM CST   Return Visit with Estiven Cali MD   [facility] ([facility])    [facility]  [address]  [place] [id number] [phone]            Jan 18, 2018  1:30 PM CST   Office Visit with Neisha Esparza MD   [facility]    [address] [facility] [address] [id number] [phone]           Bring a current list of meds and any records pertaining to this visit. For Physicals, please bring immunization records and any forms needing to be filled out. Please arrive 10 minutes early to complete paperwork.            Jan 23, 2018  1:15 PM CST   Teletrace with STONE TECH1   [facility] ([facility])    [address]  [place] [id number] [phone]              Further instructions from your care team       Discharge Instructions after Angiogram    Today you had an angiogram with . ___________________________.  You had a stent placed / an angioplasty / an atherectomy in your ____________.    When you go home, an adult should stay with you until morning. You should rest all day except for going to the bathroom.    For 24 hours    Drink extra fluids. Eat a diet low in saturated fat. No smoking or alcohol.    Keep the bandage on. Check the puncture site every 2 to 3 hours while awake.    Do not drive or work heavy machinery.    Do not make any legal or other important decisions.    The day after your exam, you may take a shower. Wait five days before taking a bath.    For 2 days    If you cough, sneeze or laugh, press firmly on the puncture site.    Do not stoop or squat.    Do not have sex.    For 3 days    Avoid hard activity such as lifting, pushing or exercise. This will help prevent bleeding.    Keep the puncture site clean and dry. Keep it covered with a Band-aid until it heals.  Do not use lotion or powder near the site.    Keep drinking extra  fluids.    For 5 days: Do not swim or take a bath.    Other instructions  ___ From now on, your diet should be low in cholesterol and saturated fat.  ___ In the future, you will need to take antibiotic (germ-fighting) medicine before you have certain procedures (such as dental work, teeth cleaning, colonoscopy and others).  Call your clinic if you have questions or need medicine.    ___ If you received a stent: Carry your stent card with you at all times. If you ever have an MRI test, your care team will need to see the card.    If you have problems or questions  Call your doctor or radiology clinic if you have:    A fever of 101  (38.3  C) or higher (under the tongue)    Increased pain, redness or a hard lump at the puncture site.    Your arm or leg feels cool, numb or changes color.    New pain in the back or lower belly that you cannot relieve with Tylenol.    If you have bleeding or increased swelling, lie down and press firmly on the site. If it s a small amount, call your clinic. If it s a large amount, call [phone], even if the bleeding stops.    If you have questions or your original symptoms do not improve, call:    ___ [facility]: [phone] (during business hours) or  [phone] (after hours). Ask for the radiologist on call.    ___ [facility]:     Call [facility] (during business hours) ([phone])            Pending Results     Date and Time Order Name Status Description    1/4/2018 1010 IR Lower Extremity Angiogram Left In process             Statement of Approval     Ordered          01/05/18 1339  I have reviewed and agree with all the recommendations and orders detailed in this document.  EFFECTIVE NOW     Approved and electronically signed by:  Aysha Mejia MD             Admission Information     Date & Time Provider Department Dept. Phone    1/4/2018 Pieter Watson MD [place] [address] [facility] [phone]      Your Vitals  "Were     Blood Pressure Pulse Temperature Respirations Height Weight    97/44 74 97.9  F (36.6  C) (Oral) 20 1.676 m (5' 6\") 72.5 kg (159 lb 13.3 oz)    Last Period Pulse Oximetry BMI (Body Mass Index)             (LMP Unknown) 96% 25.8 kg/m2         Care EveryWhere ID     This is your Care EveryWhere ID. This could be used by other organizations to access your [place] medical records  [id number]        Equal Access to Services     TONG TUCKER : Hadii sukhdeep ku hadasho Soomaali, waaxda luqadaha, qaybta kaalmada adeegyada, waxay mariin carl nolasco. So [facility] [phone].    ATENCIÓN: Si habla español, tiene a gupta disposición servicios gratuitos de asistencia lingüística. [facility] [phone].    We comply with applicable federal civil rights laws and [place] laws. We do not discriminate on the basis of race, color, national origin, age, disability, sex, sexual orientation, or gender identity.               Review of your medicines      CONTINUE these medicines which may have CHANGED, or have new prescriptions. If we are uncertain of the size of tablets/capsules you have at home, strength may be listed as something that might have changed.        Dose / Directions    betamethasone valerate 0.1 % cream   Commonly known as:  VALISONE   This may have changed:  See the new instructions.   Used for:  Rash        APPLY TOPICALLY 2 TIMES DAILY USE SPARINGLY   Quantity:  15 g   Refills:  1       fluticasone 50 MCG/ACT spray   Commonly known as:  FLONASE   This may have changed:  See the new instructions.   Used for:  Rhinitis, unspecified type        INHALE 1 TO 2 SPRAYS INTO BOTH NOSTRILS DAILY   Quantity:  16 mL   Refills:  3       furosemide 20 MG tablet   Commonly known as:  LASIX   This may have changed:  See the new instructions.   Used for:  Swelling        TAKE 1-2 TABS BY MOUTH ONCE DAILY. MAY REPEAT AFTER NOON IF NEEDED FOR WEIGHT GAIN/2+ EDEMA   Quantity:  180 tablet   Refills:  1       " gabapentin 300 MG capsule   Commonly known as:  NEURONTIN   This may have changed:    - how much to take  - when to take this   Used for:  Neuropathy        Dose:  600 mg   Take 2 capsules (600 mg) by mouth 3 times daily   Quantity:  180 capsule   Refills:  3       glipiZIDE 5 MG tablet   Commonly known as:  GLUCOTROL   This may have changed:  See the new instructions.   Used for:  Type 2 diabetes mellitus with diabetic nephropathy, without long-term current use of insulin (H)        TAKE 1 TABLET BY MOUTH EVERY MORNING AND 0.5 TABLET EVERY EVENING   Quantity:  135 tablet   Refills:  1       nystatin [id number] UNIT/GM Powd   Commonly known as:  MYCOSTATIN   This may have changed:    - when to take this  - reasons to take this   Used for:  Candidal intertrigo        Apply topically 2 times daily   Quantity:  60 g   Refills:  1       tiotropium 18 MCG capsule   Commonly known as:  SPIRIVA HANDIHALER   This may have changed:  when to take this   Used for:  Chronic obstructive pulmonary disease, unspecified COPD type (H)        Dose:  18 mcg   Inhale 1 capsule (18 mcg) into the lungs daily   Quantity:  30 capsule   Refills:  3         CONTINUE these medicines which have NOT CHANGED        Dose / Directions    ACCU-CHEK SMARTVIEW test strip   Used for:  Type 2 diabetes mellitus with diabetic nephropathy (H)   Generic drug:  blood glucose monitoring        Dose:  1 strip   1 strip by In Vitro route 2 times daily Or as directed   Quantity:  100 each   Refills:  1       albuterol 108 (90 BASE) MCG/ACT Inhaler   Commonly known as:  PROAIR HFA/PROVENTIL HFA/VENTOLIN HFA   Used for:  COPD (chronic obstructive pulmonary disease) (H)        Dose:  2 puff   Inhale 2 puffs into the lungs every 4 hours as needed for shortness of breath / dyspnea or wheezing   Quantity:  1 Inhaler   Refills:  5       apixaban ANTICOAGULANT 2.5 MG tablet   Commonly known as:  ELIQUIS   Used for:  Atrial fibrillation, unspecified type (H)        Dose:   2.5 mg   Take 1 tablet (2.5 mg) by mouth 2 times daily   Quantity:  60 tablet   Refills:  3       blood glucose calibration solution   Commonly known as:  no brand specified   Used for:  Type 2 diabetes mellitus with diabetic nephropathy, without long-term current use of insulin (H)        Use to calibrate blood glucose monitor as directed. Please dispense brand of solution that works with patient's current meter   Quantity:  1 each   Refills:  11       blood glucose monitoring lancets   Used for:  Type 2 diabetes mellitus with diabetic nephropathy (H)        USE TO TEST BLOOD SUGAR TWO TIMES A DAY OR AS DIRECTED   Quantity:  1 Box   Refills:  11       HYDROcodone-acetaminophen 5-325 MG per tablet   Commonly known as:  NORCO   Used for:  Intractable back pain, Scoliosis of lumbar spine, unspecified scoliosis type, Chronic midline low back pain with sciatica, sciatica laterality unspecified        Dose:  1 tablet   Take 1 tablet by mouth every 6 hours as needed for moderate to severe pain   Quantity:  90 tablet   Refills:  0       hydrocortisone 2.5 % cream   Commonly known as:  ANUSOL-HC   Used for:  External hemorrhoids        Place rectally 2 times daily as needed for hemorrhoids   Quantity:  28 g   Refills:  1       hydrOXYzine 25 MG capsule   Commonly known as:  VISTARIL   Used for:  Itching        Dose:  25 mg   Take 1 capsule (25 mg) by mouth 2 times daily as needed for itching   Quantity:  180 capsule   Refills:  1       lisinopril 10 MG tablet   Commonly known as:  PRINIVIL/ZESTRIL   Used for:  Essential hypertension        Dose:  10 mg   Take 1 tablet (10 mg) by mouth daily   Quantity:  90 tablet   Refills:  1       magnesium hydroxide 400 MG/5ML suspension   Commonly known as:  MILK OF MAGNESIA        Dose:  30 mL   Take 30 mLs by mouth At Bedtime   Refills:  0       ondansetron 4 MG ODT tab   Commonly known as:  ZOFRAN ODT   Used for:  Nausea        Dose:  4 mg   Take 1 tablet (4 mg) by mouth every 6  hours as needed for nausea   Quantity:  30 tablet   Refills:  1       order for DME   Used for:  Type II or unspecified type diabetes mellitus without mention of complication, not stated as uncontrolled        Equipment being ordered: AccuChek Smart View strips Patient tests twice daily.   Quantity:  1 each   Refills:  11       PRESERVISION/LUTEIN Caps        Dose:  1 capsule   Take 1 capsule by mouth daily   Refills:  0       ranitidine 150 MG tablet   Commonly known as:  ZANTAC   Used for:  Dyspepsia        Dose:  150 mg   Take 1 tablet (150 mg) by mouth 2 times daily   Quantity:  60 tablet   Refills:  3       simvastatin 10 MG tablet   Commonly known as:  ZOCOR   Used for:  Type 2 diabetes mellitus with diabetic nephropathy, without long-term current use of insulin (H)        TAKE 1 TABLET (10 MG) BY MOUTH AT BEDTIME   Quantity:  90 tablet   Refills:  0                Protect others around you: Learn how to safely use, store and throw away your medicines at www.disposemymeds.org.             Medication List: This is a list of all your medications and when to take them. Check marks below indicate your daily home schedule. Keep this list as a reference.      Medications           Morning Afternoon Evening Bedtime As Needed    ACCU-CHEK SMARTVIEW test strip   1 strip by In Vitro route 2 times daily Or as directed   Generic drug:  blood glucose monitoring                                albuterol 108 (90 BASE) MCG/ACT Inhaler   Commonly known as:  PROAIR HFA/PROVENTIL HFA/VENTOLIN HFA   Inhale 2 puffs into the lungs every 4 hours as needed for shortness of breath / dyspnea or wheezing                                   apixaban ANTICOAGULANT 2.5 MG tablet   Commonly known as:  ELIQUIS   Take 1 tablet (2.5 mg) by mouth 2 times daily   Next Dose Due:  Resume home schedule                                betamethasone valerate 0.1 % cream   Commonly known as:  VALISONE   APPLY TOPICALLY 2 TIMES DAILY USE SPARINGLY   Next  Dose Due:  Resume home schedule                                blood glucose calibration solution   Commonly known as:  no brand specified   Use to calibrate blood glucose monitor as directed. Please dispense brand of solution that works with patient's current meter                                blood glucose monitoring lancets   USE TO TEST BLOOD SUGAR TWO TIMES A DAY OR AS DIRECTED                                fluticasone 50 MCG/ACT spray   Commonly known as:  FLONASE   INHALE 1 TO 2 SPRAYS INTO BOTH NOSTRILS DAILY   Next Dose Due:  Resume home schedule                                furosemide 20 MG tablet   Commonly known as:  LASIX   TAKE 1-2 TABS BY MOUTH ONCE DAILY. MAY REPEAT AFTER NOON IF NEEDED FOR WEIGHT GAIN/2+ EDEMA   Last time this was given:  20 mg on 1/5/2018  7:57 AM                                   gabapentin 300 MG capsule   Commonly known as:  NEURONTIN   Take 2 capsules (600 mg) by mouth 3 times daily   Last time this was given:  300 mg on 1/5/2018  7:57 AM   Next Dose Due:  Two more doses due today                                         glipiZIDE 5 MG tablet   Commonly known as:  GLUCOTROL   TAKE 1 TABLET BY MOUTH EVERY MORNING AND 0.5 TABLET EVERY EVENING   Last time this was given:  5 mg on 1/5/2018  7:58 AM   Next Dose Due:  Need afternoon dose                                HYDROcodone-acetaminophen 5-325 MG per tablet   Commonly known as:  NORCO   Take 1 tablet by mouth every 6 hours as needed for moderate to severe pain                                   hydrocortisone 2.5 % cream   Commonly known as:  ANUSOL-HC   Place rectally 2 times daily as needed for hemorrhoids                                   hydrOXYzine 25 MG capsule   Commonly known as:  VISTARIL   Take 1 capsule (25 mg) by mouth 2 times daily as needed for itching   Last time this was given:  25 mg on 1/4/2018  6:48 PM                                   lisinopril 10 MG tablet   Commonly known as:  PRINIVIL/ZESTRIL   Take 1  tablet (10 mg) by mouth daily   Last time this was given:  10 mg on 1/5/2018  8:01 AM   Next Dose Due:  1/6/18                                   magnesium hydroxide 400 MG/5ML suspension   Commonly known as:  MILK OF MAGNESIA   Take 30 mLs by mouth At Bedtime   Last time this was given:  30 mLs on 1/4/2018 10:41 PM   Next Dose Due:  1/5/18                                   nystatin [id number] UNIT/GM Powd   Commonly known as:  MYCOSTATIN   Apply topically 2 times daily   Next Dose Due:  Resume home schedule                                ondansetron 4 MG ODT tab   Commonly known as:  ZOFRAN ODT   Take 1 tablet (4 mg) by mouth every 6 hours as needed for nausea                                   order for DME   Equipment being ordered: AccuChek Smart View strips Patient tests twice daily.   Next Dose Due:  Resume home schedule                                PRESERVISION/LUTEIN Caps   Take 1 capsule by mouth daily   Next Dose Due:  Resume home schedule                                ranitidine 150 MG tablet   Commonly known as:  ZANTAC   Take 1 tablet (150 mg) by mouth 2 times daily   Last time this was given:  150 mg on 1/5/2018  7:57 AM   Next Dose Due:  Second dose due this pm                                      simvastatin 10 MG tablet   Commonly known as:  ZOCOR   TAKE 1 TABLET (10 MG) BY MOUTH AT BEDTIME   Last time this was given:  10 mg on 1/4/2018 10:41 PM   Next Dose Due:  This pm                                   tiotropium 18 MCG capsule   Commonly known as:  SPIRIVA HANDIHALER   Inhale 1 capsule (18 mcg) into the lungs daily   Next Dose Due:  Resume home schedule

## 2018-01-05 ENCOUNTER — APPOINTMENT (OUTPATIENT)
Dept: ULTRASOUND IMAGING | Facility: CLINIC | Age: 83
End: 2018-01-05
Attending: SURGERY
Payer: MEDICARE

## 2018-01-05 VITALS
TEMPERATURE: 97.9 F | OXYGEN SATURATION: 96 % | HEIGHT: 66 IN | RESPIRATION RATE: 20 BRPM | DIASTOLIC BLOOD PRESSURE: 44 MMHG | BODY MASS INDEX: 25.69 KG/M2 | WEIGHT: 159.83 LBS | SYSTOLIC BLOOD PRESSURE: 97 MMHG | HEART RATE: 74 BPM

## 2018-01-05 LAB
ANION GAP SERPL CALCULATED.3IONS-SCNC: 7 MMOL/L (ref 3–14)
BUN SERPL-MCNC: 15 MG/DL (ref 7–30)
CALCIUM SERPL-MCNC: 9.8 MG/DL (ref 8.5–10.1)
CHLORIDE SERPL-SCNC: 104 MMOL/L (ref 94–109)
CO2 SERPL-SCNC: 25 MMOL/L (ref 20–32)
CREAT SERPL-MCNC: 1.11 MG/DL (ref 0.52–1.04)
ERYTHROCYTE [DISTWIDTH] IN BLOOD BY AUTOMATED COUNT: 15 % (ref 10–15)
GFR SERPL CREATININE-BSD FRML MDRD: 47 ML/MIN/1.7M2
GLUCOSE BLDC GLUCOMTR-MCNC: 123 MG/DL (ref 70–99)
GLUCOSE BLDC GLUCOMTR-MCNC: 97 MG/DL (ref 70–99)
GLUCOSE SERPL-MCNC: 124 MG/DL (ref 70–99)
HCT VFR BLD AUTO: 35.5 % (ref 35–47)
HGB BLD-MCNC: 11.8 G/DL (ref 11.7–15.7)
INR PPP: 1 (ref 0.86–1.14)
MCH RBC QN AUTO: 31.1 PG (ref 26.5–33)
MCHC RBC AUTO-ENTMCNC: 33.2 G/DL (ref 31.5–36.5)
MCV RBC AUTO: 93 FL (ref 78–100)
PLATELET # BLD AUTO: 215 10E9/L (ref 150–450)
POTASSIUM SERPL-SCNC: 4.1 MMOL/L (ref 3.4–5.3)
RBC # BLD AUTO: 3.8 10E12/L (ref 3.8–5.2)
SODIUM SERPL-SCNC: 136 MMOL/L (ref 133–144)
WBC # BLD AUTO: 7 10E9/L (ref 4–11)

## 2018-01-05 PROCEDURE — 80048 BASIC METABOLIC PNL TOTAL CA: CPT | Performed by: SURGERY

## 2018-01-05 PROCEDURE — 25000132 ZZH RX MED GY IP 250 OP 250 PS 637: Mod: GY | Performed by: SURGERY

## 2018-01-05 PROCEDURE — 85027 COMPLETE CBC AUTOMATED: CPT | Performed by: SURGERY

## 2018-01-05 PROCEDURE — 93926 LOWER EXTREMITY STUDY: CPT | Mod: RT

## 2018-01-05 PROCEDURE — 85610 PROTHROMBIN TIME: CPT | Performed by: SURGERY

## 2018-01-05 PROCEDURE — 25000128 H RX IP 250 OP 636: Performed by: SURGERY

## 2018-01-05 PROCEDURE — A9270 NON-COVERED ITEM OR SERVICE: HCPCS | Mod: GY | Performed by: SURGERY

## 2018-01-05 PROCEDURE — 36415 COLL VENOUS BLD VENIPUNCTURE: CPT | Performed by: SURGERY

## 2018-01-05 PROCEDURE — G0378 HOSPITAL OBSERVATION PER HR: HCPCS

## 2018-01-05 PROCEDURE — 00000146 ZZHCL STATISTIC GLUCOSE BY METER IP

## 2018-01-05 RX ORDER — HYDROXYZINE HCL 10 MG/5 ML
10 SOLUTION, ORAL ORAL EVERY 6 HOURS PRN
Status: DISCONTINUED | OUTPATIENT
Start: 2018-01-05 | End: 2018-01-05 | Stop reason: HOSPADM

## 2018-01-05 RX ADMIN — FUROSEMIDE 20 MG: 20 TABLET ORAL at 07:57

## 2018-01-05 RX ADMIN — Medication 1 MG: at 04:45

## 2018-01-05 RX ADMIN — GABAPENTIN 300 MG: 300 CAPSULE ORAL at 07:57

## 2018-01-05 RX ADMIN — LISINOPRIL 10 MG: 10 TABLET ORAL at 08:01

## 2018-01-05 RX ADMIN — SODIUM CHLORIDE: 9 INJECTION, SOLUTION INTRAVENOUS at 00:57

## 2018-01-05 RX ADMIN — GLIPIZIDE 5 MG: 5 TABLET ORAL at 07:58

## 2018-01-05 RX ADMIN — RANITIDINE 150 MG: 150 TABLET ORAL at 07:57

## 2018-01-05 RX ADMIN — Medication 1 MG: at 00:57

## 2018-01-05 ASSESSMENT — ACTIVITIES OF DAILY LIVING (ADL)
AMBULATION: 1-->ASSISTIVE EQUIPMENT
DRESS: 0-->INDEPENDENT
FALL_HISTORY_WITHIN_LAST_SIX_MONTHS: NO
COGNITION: 0 - NO COGNITION ISSUES REPORTED
TRANSFERRING: 0-->INDEPENDENT
RETIRED_EATING: 0-->INDEPENDENT
TOILETING: 0-->INDEPENDENT
RETIRED_COMMUNICATION: 0-->UNDERSTANDS/COMMUNICATES WITHOUT DIFFICULTY
BATHING: 0-->INDEPENDENT
SWALLOWING: 0-->SWALLOWS FOODS/LIQUIDS WITHOUT DIFFICULTY

## 2018-01-05 NOTE — PROGRESS NOTES
Transferred to station 33 per cart with walker and belongings. (R) groin site remains unchanged. Dopple pedals. VSS. Remains on bedrest. NS infusing at 100cc/hr. Report given to Devaughn ATKINS.

## 2018-01-05 NOTE — PROGRESS NOTES
Pt given written and verbal discharge instructions. Pt knows that follow up care is needed and who to call should any questions or concerns arise, all medications were reviewed with pt and all questions answered, pt is up with walker and stand by assist, denies pain, tolerates diet, good urine output, groin site dressing with scant moist drainage noted, pulses strong per doppler, numbness present but baseline for pt, wound care discussed with pt. Pt is ready for discharge.

## 2018-01-05 NOTE — PLAN OF CARE
Problem: Patient Care Overview  Goal: Plan of Care/Patient Progress Review  Outcome: No Change  A&O x4, . VSS. Right groin with soft ecchymotic hematoma, old scant dried blood noted on dressing, no bleeding/bruit present this shift. CMS intact. Back pain relieeved with prn iv dilaudid, gave per pt request. 2+ DP/PT pulses with doppler. Up with 2 assist. Pt plans to have right LE US to r/o pseudoaneurysm., possible d/c today if negative per MD.

## 2018-01-05 NOTE — PROGRESS NOTES
Vascular Surgery Progress Note         Assessment and Plan:    Assessment:   82-year old woman POD 1 s/p left lower extremity angiogram with right groin access site hematoma.      Plan:   - Arterial duplex of the groin this morning to ruleout pseudoaneurysm.  - If the arterial duplex is negative, she can be discharged home.           Interval History:   Ms Helene Gibbs is an 82-year old woman who underwent LLE angiogram via a right common femoral access. The patient developed a hematoma post-operatively and was admitted under observation status overnight. The right groin is soft this morning.           Medications:     Current Facility-Administered Medications   Medication     HOLD: apixaban (ELIQUIS) 48 hours pre-procedure     0.9% sodium chloride infusion     HYDROmorphone (PF) (DILAUDID) injection 1 mg     ondansetron (ZOFRAN) injection 4 mg     albuterol (PROAIR HFA/PROVENTIL HFA/VENTOLIN HFA) Inhaler 2 puff     fluticasone (FLONASE) 50 MCG/ACT spray 1 spray     gabapentin (NEURONTIN) capsule 300 mg     HYDROcodone-acetaminophen (NORCO) 5-325 MG per tablet 1 tablet     hydrocortisone (ANUSOL-HC) 2.5 % cream     hydrOXYzine (VISTARIL) capsule 25 mg     lisinopril (PRINIVIL/ZESTRIL) tablet 10 mg     magnesium hydroxide (MILK OF MAGNESIA) suspension 30 mL     multivitamin  with lutein (OCUVITE WITH LTEIN) per capsule 1 capsule     ranitidine (ZANTAC) tablet 150 mg     simvastatin (ZOCOR) tablet 10 mg     tiotropium (SPIRIVA) capsule 18 mcg     furosemide (LASIX) tablet 20 mg     lidocaine 1 % 1 mL     lidocaine (LMX4) cream     sodium chloride (PF) 0.9% PF flush 3 mL     sodium chloride (PF) 0.9% PF flush 3 mL     naloxone (NARCAN) injection 0.1-0.4 mg     glipiZIDE (GLUCOTROL) tablet 5 mg     glucose 40 % gel 15-30 g    Or     dextrose 50 % injection 25-50 mL    Or     glucagon injection 1 mg             Physical Exam:   Temp: 99.1  F (37.3  C) Temp src: Oral BP: 148/74 Pulse: 70 Heart Rate: 74 Resp: 18 SpO2: 97  % O2 Device: None (Room air) Oxygen Delivery: 2 LPM    General: AAOX3  HEENT: NC, AT  Abdomen: soft  Groin: Right groin is more swollen than the left, but it is soft. No pulsatile mass felt.  Ext: Feet are warm and perfused.          Data:   Lab Results   Component Value Date    WBC 6.5 01/04/2018    HGB 12.6 01/04/2018    HCT 38.0 01/04/2018     01/04/2018     12/02/2017    POTASSIUM 4.0 12/02/2017    CHLORIDE 100 12/02/2017    CO2 35 (H) 12/02/2017    BUN 27 12/02/2017    CR 1.20 (H) 01/04/2018     (H) 12/02/2017    SED 15 05/03/2016    NTBNPI 3498 (H) 08/09/2017    TROPI  12/03/2015     <0.015  The 99th percentile for upper reference range is 0.045 ug/L.  Troponin values in   the range of 0.045 - 0.120 ug/L may be associated with risks of adverse   clinical events.      AST 12 12/02/2017    ALT 17 12/02/2017    ALKPHOS 141 12/02/2017    BILITOTAL 0.3 12/02/2017    INR 0.97 01/04/2018              Aysha Haque) MD Jackie  Vascular Surgery Fellow  [phone] (p)

## 2018-01-05 NOTE — PROGRESS NOTES
Right groin examined.  Small amount of oozing from the percutaneous access site, probably from the subcutaneous tissue.  Pressure applied for ten minutes and oozing stopped.  No evidence of large hematoma, and patient with no pain while resting in bed in the right groin.  Will obtain an arterial duplex to ensure no evidence of pseudoaneurysm in the right groin. She is hemodynamically stable.     Rinku Mora MD  Vascular Surgery

## 2018-01-05 NOTE — PROGRESS NOTES
Vascular Surgey Progress Note    Ms. Helene Gibbs underwent left lower extremity angiogram via a right groin femoral access. She developed a hematoma at the end of the case and she will be admitted to observation overnight.    She was examined at bedside. Her right groin hematoma is soft.No pulsating mass felt.    Will get an arterial duplex in the morning to make sure she doesn't have a pseudoaneurysm. IF the duplex is negative, she can go home tomorrow morning.    Aysha (Dg) MD Jakcie  Vascular Surgery Fellow  [phone] (p)

## 2018-01-05 NOTE — DISCHARGE SUMMARY
[facility]   Vascular Surgery Discharge Summary    Date of Admission: 1/4/2018  Date of Discharge: 1/5/2018    Admission Diagnosis:  1. Left lower extremity claudication  Past Medical History:   Diagnosis Date     Anemia      Ankle arthritis      Arthritis      Back pain      Bell's palsy 9/08    left     Breast CA (H) 2004-    left lumpectomy, radiation, -recurrence     Colon polyps     colonoscopy-9/12-next due in 9/13     Congestive heart failure (H)      COPD (chronic obstructive pulmonary disease) (H)      Coronary artery disease      DM (diabetes mellitus) (H)      GERD (gastroesophageal reflux disease)      Hyperlipidemia      Hypertension      Lumbar spinal stenosis     use cane     Obesity      Osteoporosis      Other chronic pain      Pacemaker      Permanent atrial fibrillation (H) 8/2008    S/P AV node ablation and pacemaker 10-25-12       Pleural effusion      Pulmonary hypertension      Rectal stenosis      Tobacco abuse     current       Discharge Diagnosis:  1. Same as above  Past Medical History:   Diagnosis Date     Anemia      Ankle arthritis      Arthritis      Back pain      Bell's palsy 9/08    left     Breast CA (H) 2004-    left lumpectomy, radiation, -recurrence     Colon polyps     colonoscopy-9/12-next due in 9/13     Congestive heart failure (H)      COPD (chronic obstructive pulmonary disease) (H)      Coronary artery disease      DM (diabetes mellitus) (H)      GERD (gastroesophageal reflux disease)      Hyperlipidemia      Hypertension      Lumbar spinal stenosis     use cane     Obesity      Osteoporosis      Other chronic pain      Pacemaker      Permanent atrial fibrillation (H) 8/2008    S/P AV node ablation and pacemaker 10-25-12       Pleural effusion      Pulmonary hypertension      Rectal stenosis      Tobacco abuse     current       Consultations:  None    Procedures:  1. Left lower extremity angiogram and left SFA stenting by   Walter.    Brief HPI:  Ms. Helene Gibbs is an 82-year old woman, who had history of left lower extremity claudication. She underwent stenting of te left SFA via right common femoral access. The patient developed a right groin hematoma after the procedure.    Hospital Course:  The patient was admitted to observation status. Overnight, she has small amounts of bleeding which was controlled with manual pressure. The next morning, she did not have a right common femoral pseudoaneurysm on duplex ultrasound. She is discharged home.    Discharge Physical Exam:  General: AAOX3  HEENT: NC, AT  Abdomen: soft  Groin: Right groin is more swollen than the left, but it is soft. No pulsatile mass felt.  Ext: Feet are warm and perfused.    Meds:     Review of your medicines      CONTINUE these medicines which may have CHANGED, or have new prescriptions. If we are uncertain of the size of tablets/capsules you have at home, strength may be listed as something that might have changed.       Dose / Directions    betamethasone valerate 0.1 % cream   Commonly known as:  VALISONE   This may have changed:  See the new instructions.   Used for:  Rash        APPLY TOPICALLY 2 TIMES DAILY USE SPARINGLY   Quantity:  15 g   Refills:  1       fluticasone 50 MCG/ACT spray   Commonly known as:  FLONASE   This may have changed:  See the new instructions.   Used for:  Rhinitis, unspecified type        INHALE 1 TO 2 SPRAYS INTO BOTH NOSTRILS DAILY   Quantity:  16 mL   Refills:  3       furosemide 20 MG tablet   Commonly known as:  LASIX   This may have changed:  See the new instructions.   Used for:  Swelling        TAKE 1-2 TABS BY MOUTH ONCE DAILY. MAY REPEAT AFTER NOON IF NEEDED FOR WEIGHT GAIN/2+ EDEMA   Quantity:  180 tablet   Refills:  1       gabapentin 300 MG capsule   Commonly known as:  NEURONTIN   This may have changed:    - how much to take  - when to take this   Used for:  Neuropathy        Dose:  600 mg   Take 2 capsules (600 mg) by  mouth 3 times daily   Quantity:  180 capsule   Refills:  3       glipiZIDE 5 MG tablet   Commonly known as:  GLUCOTROL   This may have changed:  See the new instructions.   Used for:  Type 2 diabetes mellitus with diabetic nephropathy, without long-term current use of insulin (H)        TAKE 1 TABLET BY MOUTH EVERY MORNING AND 0.5 TABLET EVERY EVENING   Quantity:  135 tablet   Refills:  1       nystatin 874228 UNIT/GM Powd   Commonly known as:  MYCOSTATIN   This may have changed:    - when to take this  - reasons to take this   Used for:  Candidal intertrigo        Apply topically 2 times daily   Quantity:  60 g   Refills:  1       tiotropium 18 MCG capsule   Commonly known as:  SPIRIVA HANDIHALER   This may have changed:  when to take this   Used for:  Chronic obstructive pulmonary disease, unspecified COPD type (H)        Dose:  18 mcg   Inhale 1 capsule (18 mcg) into the lungs daily   Quantity:  30 capsule   Refills:  3         CONTINUE these medicines which have NOT CHANGED       Dose / Directions    ACCU-CHEK SMARTVIEW test strip   Used for:  Type 2 diabetes mellitus with diabetic nephropathy (H)   Generic drug:  blood glucose monitoring        Dose:  1 strip   1 strip by In Vitro route 2 times daily Or as directed   Quantity:  100 each   Refills:  1       albuterol 108 (90 BASE) MCG/ACT Inhaler   Commonly known as:  PROAIR HFA/PROVENTIL HFA/VENTOLIN HFA   Used for:  COPD (chronic obstructive pulmonary disease) (H)        Dose:  2 puff   Inhale 2 puffs into the lungs every 4 hours as needed for shortness of breath / dyspnea or wheezing   Quantity:  1 Inhaler   Refills:  5       apixaban ANTICOAGULANT 2.5 MG tablet   Commonly known as:  ELIQUIS   Used for:  Atrial fibrillation, unspecified type (H)        Dose:  2.5 mg   Take 1 tablet (2.5 mg) by mouth 2 times daily   Quantity:  60 tablet   Refills:  3       blood glucose calibration solution   Commonly known as:  no brand specified   Used for:  Type 2 diabetes  mellitus with diabetic nephropathy, without long-term current use of insulin (H)        Use to calibrate blood glucose monitor as directed. Please dispense brand of solution that works with patient's current meter   Quantity:  1 each   Refills:  11       blood glucose monitoring lancets   Used for:  Type 2 diabetes mellitus with diabetic nephropathy (H)        USE TO TEST BLOOD SUGAR TWO TIMES A DAY OR AS DIRECTED   Quantity:  1 Box   Refills:  11       HYDROcodone-acetaminophen 5-325 MG per tablet   Commonly known as:  NORCO   Used for:  Intractable back pain, Scoliosis of lumbar spine, unspecified scoliosis type, Chronic midline low back pain with sciatica, sciatica laterality unspecified        Dose:  1 tablet   Take 1 tablet by mouth every 6 hours as needed for moderate to severe pain   Quantity:  90 tablet   Refills:  0       hydrocortisone 2.5 % cream   Commonly known as:  ANUSOL-HC   Used for:  External hemorrhoids        Place rectally 2 times daily as needed for hemorrhoids   Quantity:  28 g   Refills:  1       hydrOXYzine 25 MG capsule   Commonly known as:  VISTARIL   Used for:  Itching        Dose:  25 mg   Take 1 capsule (25 mg) by mouth 2 times daily as needed for itching   Quantity:  180 capsule   Refills:  1       lisinopril 10 MG tablet   Commonly known as:  PRINIVIL/ZESTRIL   Used for:  Essential hypertension        Dose:  10 mg   Take 1 tablet (10 mg) by mouth daily   Quantity:  90 tablet   Refills:  1       magnesium hydroxide 400 MG/5ML suspension   Commonly known as:  MILK OF MAGNESIA        Dose:  30 mL   Take 30 mLs by mouth At Bedtime   Refills:  0       ondansetron 4 MG ODT tab   Commonly known as:  ZOFRAN ODT   Used for:  Nausea        Dose:  4 mg   Take 1 tablet (4 mg) by mouth every 6 hours as needed for nausea   Quantity:  30 tablet   Refills:  1       order for DME   Used for:  Type II or unspecified type diabetes mellitus without mention of complication, not stated as uncontrolled         Equipment being ordered: AccuChek Smart View strips Patient tests twice daily.   Quantity:  1 each   Refills:  11       PRESERVISION/LUTEIN Caps        Dose:  1 capsule   Take 1 capsule by mouth daily   Refills:  0       ranitidine 150 MG tablet   Commonly known as:  ZANTAC   Used for:  Dyspepsia        Dose:  150 mg   Take 1 tablet (150 mg) by mouth 2 times daily   Quantity:  60 tablet   Refills:  3       simvastatin 10 MG tablet   Commonly known as:  ZOCOR   Used for:  Type 2 diabetes mellitus with diabetic nephropathy, without long-term current use of insulin (H)        TAKE 1 TABLET (10 MG) BY MOUTH AT BEDTIME   Quantity:  90 tablet   Refills:  0             Discharge Instructions:    Reason for your hospital stay   Admitted for right groin hematoma after right femoral approach. Did well. Suitable for discharge.     Follow-up and recommended labs and tests    Follow up with Pieter Watson in one week     Activity   Your activity upon discharge: Ambulation     Full Code     Diet   Follow this diet upon discharge         Aysha Haque) MD Jackie  Vascular Surgery Fellow  [phone] (p)

## 2018-01-05 NOTE — DISCHARGE INSTRUCTIONS
Discharge Instructions after Angiogram    Today you had an angiogram with  ___________________________.  You had a stent placed / an angioplasty / an atherectomy in your ____________.    When you go home, an adult should stay with you until morning. You should rest all day except for going to the bathroom.    For 24 hours    Drink extra fluids. Eat a diet low in saturated fat. No smoking or alcohol.    Keep the bandage on. Check the puncture site every 2 to 3 hours while awake.    Do not drive or work heavy machinery.    Do not make any legal or other important decisions.    The day after your exam, you may take a shower. Wait five days before taking a bath.    For 2 days    If you cough, sneeze or laugh, press firmly on the puncture site.    Do not stoop or squat.    Do not have sex.    For 3 days    Avoid hard activity such as lifting, pushing or exercise. This will help prevent bleeding.    Keep the puncture site clean and dry. Keep it covered with a Band-aid until it heals.  Do not use lotion or powder near the site.    Keep drinking extra fluids.    For 5 days: Do not swim or take a bath.    Other instructions  ___ From now on, your diet should be low in cholesterol and saturated fat.  ___ In the future, you will need to take antibiotic (germ-fighting) medicine before you have certain procedures (such as dental work, teeth cleaning, colonoscopy and others).  Call your clinic if you have questions or need medicine.    ___ If you received a stent: Carry your stent card with you at all times. If you ever have an MRI test, your care team will need to see the card.    If you have problems or questions  Call your doctor or radiology clinic if you have:    A fever of 101  (38.3  C) or higher (under the tongue)    Increased pain, redness or a hard lump at the puncture site.    Your arm or leg feels cool, numb or changes color.    New pain in the back or lower belly that you cannot relieve with Tylenol.    If you  have bleeding or increased swelling, lie down and press firmly on the site. If it s a small amount, call your clinic. If it s a large amount, call [phone], even if the bleeding stops.    If you have questions or your original symptoms do not improve, call:    ___ [facility]: [phone] (during business hours) or  [phone] (after hours). Ask for the radiologist on call.    ___ [facility]:     Call [facility] (during business hours) ([phone])

## 2018-01-05 NOTE — PROGRESS NOTES
Vascular Surgery Progress Note    Arterial duplex reviewed. No pseudoaneurysm. The patient can be discharged home today.    Aysha Haque) MD Jackie  Vascular Surgery Fellow  [phone] (p)

## 2018-01-05 NOTE — PLAN OF CARE
Problem: Patient Care Overview  Goal: Plan of Care/Patient Progress Review  Outcome: No Change  A&O. VSS. CMS intact. R groin site unchanged. Soft to palpation. Less tenderness than previous per Pt. No mass felt, No Bruit. Pulses obtained via doppler. Pain reduced with IV dilaudid x2. LS clear. Plan for US to assess for pseudoaneurysm in AM. Plan to D/C if cleared.    Addendum: Groin site oozing at 2200. Manual pressure applied Dr. Mora assessed. Oozing ceded at 2215. New drsg applied per Dr. Mora. CMS unchanged, Pulses obtained per doppler. VSS.

## 2018-01-08 ENCOUNTER — TELEPHONE (OUTPATIENT)
Dept: FAMILY MEDICINE | Facility: CLINIC | Age: 83
End: 2018-01-08

## 2018-01-08 NOTE — TELEPHONE ENCOUNTER
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

## 2018-01-09 ENCOUNTER — ONCOLOGY VISIT (OUTPATIENT)
Dept: ONCOLOGY | Facility: CLINIC | Age: 83
End: 2018-01-09
Attending: INTERNAL MEDICINE
Payer: MEDICARE

## 2018-01-09 ENCOUNTER — HOSPITAL ENCOUNTER (OUTPATIENT)
Facility: CLINIC | Age: 83
Setting detail: SPECIMEN
Discharge: HOME | End: 2018-01-09
Attending: INTERNAL MEDICINE | Admitting: INTERNAL MEDICINE
Payer: MEDICARE

## 2018-01-09 DIAGNOSIS — D64.9 ANEMIA, UNSPECIFIED TYPE: ICD-10-CM

## 2018-01-09 DIAGNOSIS — E61.1 IRON DEFICIENCY: ICD-10-CM

## 2018-01-09 LAB
ERYTHROCYTE [DISTWIDTH] IN BLOOD BY AUTOMATED COUNT: 14.9 % (ref 10–15)
FERRITIN SERPL-MCNC: 37 NG/ML (ref 8–252)
HCT VFR BLD AUTO: 30.8 % (ref 35–47)
HGB BLD-MCNC: 10.2 G/DL (ref 11.7–15.7)
IRON SATN MFR SERPL: 13 % (ref 15–46)
IRON SERPL-MCNC: 47 UG/DL (ref 35–180)
MCH RBC QN AUTO: 30.8 PG (ref 26.5–33)
MCHC RBC AUTO-ENTMCNC: 33.1 G/DL (ref 31.5–36.5)
MCV RBC AUTO: 93 FL (ref 78–100)
PLATELET # BLD AUTO: 207 10E9/L (ref 150–450)
RBC # BLD AUTO: 3.31 10E12/L (ref 3.8–5.2)
RETICS # AUTO: 73.2 10E9/L (ref 25–95)
RETICS/RBC NFR AUTO: 2.2 % (ref 0.5–2)
TIBC SERPL-MCNC: 356 UG/DL (ref 240–430)
WBC # BLD AUTO: 5.8 10E9/L (ref 4–11)

## 2018-01-09 PROCEDURE — 83540 ASSAY OF IRON: CPT | Performed by: INTERNAL MEDICINE

## 2018-01-09 PROCEDURE — 82728 ASSAY OF FERRITIN: CPT | Performed by: INTERNAL MEDICINE

## 2018-01-09 PROCEDURE — 85045 AUTOMATED RETICULOCYTE COUNT: CPT | Performed by: INTERNAL MEDICINE

## 2018-01-09 PROCEDURE — 90471 IMMUNIZATION ADMIN: CPT

## 2018-01-09 PROCEDURE — 85027 COMPLETE CBC AUTOMATED: CPT | Performed by: INTERNAL MEDICINE

## 2018-01-09 PROCEDURE — 36415 COLL VENOUS BLD VENIPUNCTURE: CPT

## 2018-01-09 PROCEDURE — 83550 IRON BINDING TEST: CPT | Performed by: INTERNAL MEDICINE

## 2018-01-09 NOTE — LETTER
1/9/2018         RE: Helene Gibbs  [address]   [place] [facility] [address]        Dear Colleague,    Thank you for referring your patient, Helene Gibbs, to the [facility]. Please see a copy of my visit note below.    Medical Assistant Note:  Helene Gibbs presents today for yes.    Patient seen by provider today: No.   present during visit today: Not Applicable.    Concerns: No Concerns.    Procedure:  Lab draw site: RAC, Needle type: BF, Gauge: 21. Guaze and coban applied    Post Assessment:  Labs drawn without difficulty: Yes.    Discharge Plan:  Departure Mode: Ambulatory.    Face to Face Time: 5.    Amirah Davis MA              Again, thank you for allowing me to participate in the care of your patient.        Sincerely,        Oncology Nurse

## 2018-01-09 NOTE — PROGRESS NOTES
Medical Assistant Note:  Helene Gibbs presents today for yes.    Patient seen by provider today: No.   present during visit today: Not Applicable.    Concerns: No Concerns.    Procedure:  Lab draw site: RAC, Needle type: BF, Gauge: 21. Guaze and coban applied    Post Assessment:  Labs drawn without difficulty: Yes.    Discharge Plan:  Departure Mode: Ambulatory.    Face to Face Time: 5.    Amirah Davis MA

## 2018-01-09 NOTE — MR AVS SNAPSHOT
After Visit Summary   1/9/2018    Helene Gibbs    MRN: [id number]           Patient Information     Date Of Birth          1935        Visit Information        Provider Department      1/9/2018 1:30 PM Nurse,  Oncology [facility] [place]        Today's Diagnoses     Anemia, unspecified type        Iron deficiency           Follow-ups after your visit        Your next 10 appointments already scheduled     Jan 12, 2018 11:30 AM CST   Return Visit with Pieter Watson MD   [facility] ([facility])    [address]  [facility] [address] [phone]            Jan 16, 2018  2:00 PM CST   Return Visit with Estiven Cali MD   [facility] [place] ([facility])    [facility]  [address]  [facility] [address] [phone]            Jan 18, 2018  1:30 PM CST   [facility] Visit with Neisha [facility]za MD   [facility]    [address] [facility] [id number] [phone]           Bring a current list of meds and any records pertaining to this visit. For Physicals, please bring immunization records and any forms needing to be filled out. Please arrive 10 minutes early to complete paperwork.            Jan 23, 2018  1:15 PM CST   Teletrace with STONE TECH1   [facility] ([facility]    [address]  [facility] [id number] [phone]              Who to contact     If you have questions or need follow up information about today's clinic visit or your schedule please contact [facility] directly at [phone].  Normal or non-critical lab and imaging results will be communicated to you by MyChart, letter or phone within 4 business days after the clinic has received the results. If you do not hear from us within 7 days, please contact the  clinic through [employer] or phone. If you have a critical or abnormal lab result, we will notify you by phone as soon as possible.  Submit refill requests through [employer] or call your pharmacy and they will forward the refill request to us. Please allow 3 business days for your refill to be completed.          Additional Information About Your Visit        Care EveryWhere ID     This is your Care EveryWhere ID. This could be used by other organizations to access your [place] medical records  [id number]        Your Vitals Were     Last Period                   (LMP Unknown)            Blood Pressure from Last 3 Encounters:   01/05/18 97/44   12/28/17 139/80   12/22/17 105/56    Weight from Last 3 Encounters:   01/05/18 72.5 kg (159 lb 13.3 oz)   12/28/17 73 kg (161 lb)   12/22/17 75.3 kg (166 lb)              We Performed the Following     CBC with platelets     Ferritin     Iron and iron binding capacity     Reticulocyte count          Today's Medication Changes          These changes are accurate as of: 1/9/18  1:53 PM.  If you have any questions, ask your nurse or doctor.               These medicines have changed or have updated prescriptions.        Dose/Directions    betamethasone valerate 0.1 % cream   Commonly known as:  VALISONE   This may have changed:  See the new instructions.   Used for:  Rash        APPLY TOPICALLY 2 TIMES DAILY USE SPARINGLY   Quantity:  15 g   Refills:  1       fluticasone 50 MCG/ACT spray   Commonly known as:  FLONASE   This may have changed:  See the new instructions.   Used for:  Rhinitis, unspecified type        INHALE 1 TO 2 SPRAYS INTO BOTH NOSTRILS DAILY   Quantity:  16 mL   Refills:  3       furosemide 20 MG tablet   Commonly known as:  LASIX   This may have changed:  See the new instructions.   Used for:  Swelling        TAKE 1-2 TABS BY MOUTH ONCE DAILY. MAY REPEAT AFTER NOON IF NEEDED FOR WEIGHT GAIN/2+ EDEMA   Quantity:  180 tablet   Refills:  1       gabapentin 300 MG  capsule   Commonly known as:  NEURONTIN   This may have changed:    - how much to take  - when to take this   Used for:  Neuropathy        Dose:  600 mg   Take 2 capsules (600 mg) by mouth 3 times daily   Quantity:  180 capsule   Refills:  3       glipiZIDE 5 MG tablet   Commonly known as:  GLUCOTROL   This may have changed:  See the new instructions.   Used for:  Type 2 diabetes mellitus with diabetic nephropathy, without long-term current use of insulin (H)        TAKE 1 TABLET BY MOUTH EVERY MORNING AND 0.5 TABLET EVERY EVENING   Quantity:  135 tablet   Refills:  1       nystatin 906605 UNIT/GM Powd   Commonly known as:  MYCOSTATIN   This may have changed:    - when to take this  - reasons to take this   Used for:  Candidal intertrigo        Apply topically 2 times daily   Quantity:  60 g   Refills:  1       tiotropium 18 MCG capsule   Commonly known as:  SPIRIVA HANDIHALER   This may have changed:  when to take this   Used for:  Chronic obstructive pulmonary disease, unspecified COPD type (H)        Dose:  18 mcg   Inhale 1 capsule (18 mcg) into the lungs daily   Quantity:  30 capsule   Refills:  3                Primary Care Provider Office Phone # Fax #    Neisha MARIMAR Esparza [phone] [address] [facility] [address]  [place]                [place] [address]        Goals        General    start date: 4/17/14 I will weigh myself daily and if any weight gain or shortness of breath I will call the clinic. (pt-stated)     Notes - Note created  4/17/2014  3:59 PM by Margareth Pichardo, RN    As of today's date 4/17/2014 goal is met at 0 - 25%.   Goal Status:  Active        Equal Access to Services     TONG TUCKER AH: Hadii sukhdeep Brady, waaxda luqadaha, qaybta kaalmapaemla preciado. So [facility] [phone].    ATENCIÓN: Si habla español, tiene a gupta disposición servicios gratuitos de asistencia lingüística. Llame al [phone].    We comply with applicable  federal civil rights laws and [place] laws. We do not discriminate on the basis of race, color, national origin, age, disability, sex, sexual orientation, or gender identity.            Thank you!     Thank you for choosing [facility]  for your care. Our goal is always to provide you with excellent care. Hearing back from our patients is one way we can continue to improve our services. Please take a few minutes to complete the written survey that you may receive in the mail after your visit with us. Thank you!             Your Updated Medication List - Protect others around you: Learn how to safely use, store and throw away your medicines at www.disposemymeds.org.          This list is accurate as of: 1/9/18  1:53 PM.  Always use your most recent med list.                   Brand Name Dispense Instructions for use Diagnosis    ACCU-CHEK SMARTVIEW test strip   Generic drug:  blood glucose monitoring     100 each    1 strip by In Vitro route 2 times daily Or as directed    Type 2 diabetes mellitus with diabetic nephropathy (H)       albuterol 108 (90 BASE) MCG/ACT Inhaler    PROAIR HFA/PROVENTIL HFA/VENTOLIN HFA    1 Inhaler    Inhale 2 puffs into the lungs every 4 hours as needed for shortness of breath / dyspnea or wheezing    COPD (chronic obstructive pulmonary disease) (H)       apixaban ANTICOAGULANT 2.5 MG tablet    ELIQUIS    60 tablet    Take 1 tablet (2.5 mg) by mouth 2 times daily    Atrial fibrillation, unspecified type (H)       betamethasone valerate 0.1 % cream    VALISONE    15 g    APPLY TOPICALLY 2 TIMES DAILY USE SPARINGLY    Rash       blood glucose calibration solution    no brand specified    1 each    Use to calibrate blood glucose monitor as directed. Please dispense brand of solution that works with patient's current meter    Type 2 diabetes mellitus with diabetic nephropathy, without long-term current use of insulin (H)       blood glucose monitoring lancets     1 Box    USE TO  TEST BLOOD SUGAR TWO TIMES A DAY OR AS DIRECTED    Type 2 diabetes mellitus with diabetic nephropathy (H)       fluticasone 50 MCG/ACT spray    FLONASE    16 mL    INHALE 1 TO 2 SPRAYS INTO BOTH NOSTRILS DAILY    Rhinitis, unspecified type       furosemide 20 MG tablet    LASIX    180 tablet    TAKE 1-2 TABS BY MOUTH ONCE DAILY. MAY REPEAT AFTER NOON IF NEEDED FOR WEIGHT GAIN/2+ EDEMA    Swelling       gabapentin 300 MG capsule    NEURONTIN    180 capsule    Take 2 capsules (600 mg) by mouth 3 times daily    Neuropathy       glipiZIDE 5 MG tablet    GLUCOTROL    135 tablet    TAKE 1 TABLET BY MOUTH EVERY MORNING AND 0.5 TABLET EVERY EVENING    Type 2 diabetes mellitus with diabetic nephropathy, without long-term current use of insulin (H)       HYDROcodone-acetaminophen 5-325 MG per tablet    NORCO    90 tablet    Take 1 tablet by mouth every 6 hours as needed for moderate to severe pain    Intractable back pain, Scoliosis of lumbar spine, unspecified scoliosis type, Chronic midline low back pain with sciatica, sciatica laterality unspecified       hydrocortisone 2.5 % cream    ANUSOL-HC    28 g    Place rectally 2 times daily as needed for hemorrhoids    External hemorrhoids       hydrOXYzine 25 MG capsule    VISTARIL    180 capsule    Take 1 capsule (25 mg) by mouth 2 times daily as needed for itching    Itching       lisinopril 10 MG tablet    PRINIVIL/ZESTRIL    90 tablet    Take 1 tablet (10 mg) by mouth daily    Essential hypertension       magnesium hydroxide 400 MG/5ML suspension    MILK OF MAGNESIA     Take 30 mLs by mouth At Bedtime        nystatin [id number] UNIT/GM Powd    MYCOSTATIN    60 g    Apply topically 2 times daily    Candidal intertrigo       ondansetron 4 MG ODT tab    ZOFRAN ODT    30 tablet    Take 1 tablet (4 mg) by mouth every 6 hours as needed for nausea    Nausea       order for DME     1 each    Equipment being ordered: AccuChek Smart View strips Patient tests twice daily.    Type II or  unspecified type diabetes mellitus without mention of complication, not stated as uncontrolled       PRESERVISION/LUTEIN Caps      Take 1 capsule by mouth daily        ranitidine 150 MG tablet    ZANTAC    60 tablet    Take 1 tablet (150 mg) by mouth 2 times daily    Dyspepsia       simvastatin 10 MG tablet    ZOCOR    90 tablet    TAKE 1 TABLET (10 MG) BY MOUTH AT BEDTIME    Type 2 diabetes mellitus with diabetic nephropathy, without long-term current use of insulin (H)       tiotropium 18 MCG capsule    SPIRIVA HANDIHALER    30 capsule    Inhale 1 capsule (18 mcg) into the lungs daily    Chronic obstructive pulmonary disease, unspecified COPD type (H)

## 2018-01-09 NOTE — TELEPHONE ENCOUNTER
Outreach to patient post hospital d/c  Admitted to [facility] 01/05/2018 for scheduled Left lower extremity angiogram and left SFA stenting by Dr Jin.  D/c'd to home with instruction to f/u iwht Dr. Jin in one week.   No Medication changes.   Patient has a 6wk f/u with PCP on 01/18/18 from December visit.     LVM for patient to return call to clinic.     MARIOLA RomeroN, RN, PHN  Clinic Care Coordinator  [facility]  [phone]

## 2018-01-10 NOTE — TELEPHONE ENCOUNTER
"ED / Discharge Outreach Protocol    Patient Contact    Attempt # 2    Was call answered?  Yes.  \"May I please speak with <Helene>\"  Is patient available?   Yes    ED/Discharge Protocol    \"Hi, my name is Shawanda Terrell, a registered nurse, and I am calling on behalf of Dr. Esparza's office at [place].  I am calling to follow up and see how things are going for you after your recent visit.\"    \"I see that you were in the (ER/UC/IP) on Jan 4-5 2018  How are you doing now that you are home?\" Doing fine, stayed overnight because could not stop bleeding. Has had bandage changed last Sunday with home health aide. Not currently having any bleeding. Is black and blue over pelvic area from pressure during procedure. Following instructions. Avoiding squatting and heavy lifting. Normal activity   Dr. Watson was not able to fully complete procedure - as far as the little toe, still having toe pain (left) \"was not able to get far enough down with angiogram\" Stent was not as far as it should   Will follow up on Friday with Dr. Watson   Shingles are gone finally - lasted from Sept to Dec.     Is patient experiencing symptoms that may require a hospital visit?  Left pinky toe pain - dark colored, no changes since hospital     Discharge Instructions    \"Let's review your discharge instructions.  What is/are the follow-up recommendations?  Pt. Response: Follow up with Dr. Watson     \"Were you instructed to make a follow-up appointment?\"  Pt. Response: No. But pt is scheduled for 1/18/18     \"When you see the provider, I would recommend that you bring your discharge instructions with you.    Medications    \"How many new medications are you on since your hospitalization/ED visit?\"    0-1  \"How many of your current medicines changed (dose, timing, name, etc.) while you were in the hospital/ED visit?\"   0-1  \"Do you have questions about your medications?\"   No  \"Were you newly diagnosed with heart failure, COPD, diabetes or did you have " "a heart attack?\"   No  For patients on insulin: \"Did you start on insulin in the hospital or did you have your insulin dose changed?\"   No    Medication reconciliation completed? Yes    Was MTM referral placed (*Make sure to put transitions as reason for referral)?   No    Call Summary    \"Do you have any questions or concerns about your condition or care plan at the moment?\"    No  Triage nurse advice given: N/A    Patient was in ER 6 times in the past year (assess appropriateness of ER visits.)      \"If you have questions or things don't continue to improve, we encourage you contact us through the main clinic number, ([phone])   Even if the clinic is not open, triage nurses are available 24/7 to help you.     We would like you to know that our clinic has extended hours (provide information).  We also have urgent care (provide details on closest location and hours/contact info)\"    \"Thank you for your time and take care!\"    Shawanda REID RN      About your hospital stay            You were [facility] on:  [place] 4[place] [facility] [facility] [facility]      You were discharged on:  January 5, 2018                 Reason for your hospital stay         Admitted for right groin hematoma after right femoral approach. Did well. Suitable for discharge.                      After Care Instructions           Activity         Your activity upon discharge: Ambulation               Diet         Follow this diet upon discharge                        Follow-up Appointments           Follow-up and recommended labs and tests          Follow up with Pieter Watson in one week               Discharge Instructions after Angiogram     Today you had an angiogram with  ___________________________.  You had a stent placed / an angioplasty / an atherectomy in your ____________.     When you go home, an adult should stay with you until morning. You should rest all day except for going to the " bathroom.     For 24 hours    Drink extra fluids. Eat a diet low in saturated fat. No smoking or alcohol.    Keep the bandage on. Check the puncture site every 2 to 3 hours while awake.    Do not drive or work heavy machinery.    Do not make any legal or other important decisions.     The day after your exam, you may take a shower. Wait five days before taking a bath.     For 2 days    If you cough, sneeze or laugh, press firmly on the puncture site.    Do not stoop or squat.    Do not have sex.     For 3 days    Avoid hard activity such as lifting, pushing or exercise. This will help prevent bleeding.    Keep the puncture site clean and dry. Keep it covered with a Band-aid until it heals.  Do not use lotion or powder near the site.    Keep drinking extra fluids.     For 5 days: Do not swim or take a bath.     Other instructions  ___ From now on, your diet should be low in cholesterol and saturated fat.  ___ In the future, you will need to take antibiotic (germ-fighting) medicine before you have certain procedures (such as dental work, teeth cleaning, colonoscopy and others).  Call your clinic if you have questions or need medicine.     ___ If you received a stent: Carry your stent card with you at all times. If you ever have an MRI test, your care team will need to see the card.     If you have problems or questions  Call your doctor or radiology clinic if you have:    A fever of 101  (38.3  C) or higher (under the tongue)    Increased pain, redness or a hard lump at the puncture site.    Your arm or leg feels cool, numb or changes color.    New pain in the back or lower belly that you cannot relieve with Tylenol.     If you have bleeding or increased swelling, lie down and press firmly on the site. If it s a small amount, call your clinic. If it s a large amount, call 911, even if the bleeding stops.     If you have questions or your original symptoms do not improve, call:     ___ [facility]:  [phone] (during business hours) or  [phone] (after hours). Ask for the radiologist on call.     ___ [facility]:      Call [place] [facility] (during business hours) ([phone])

## 2018-01-12 ENCOUNTER — OFFICE VISIT (OUTPATIENT)
Dept: OTHER | Facility: CLINIC | Age: 83
End: 2018-01-12
Attending: SURGERY
Payer: MEDICARE

## 2018-01-12 VITALS — DIASTOLIC BLOOD PRESSURE: 78 MMHG | SYSTOLIC BLOOD PRESSURE: 153 MMHG | HEART RATE: 70 BPM

## 2018-01-12 DIAGNOSIS — I70.229 CRITICAL ISCHEMIA OF LOWER EXTREMITY (H): Primary | ICD-10-CM

## 2018-01-12 PROCEDURE — G0463 HOSPITAL OUTPT CLINIC VISIT: HCPCS

## 2018-01-12 PROCEDURE — 99212 OFFICE O/P EST SF 10 MIN: CPT | Mod: ZP | Performed by: SURGERY

## 2018-01-12 NOTE — NURSING NOTE
"Chief Complaint   Patient presents with     RECHECK     PO; LEFT LOWER EXTREMITY ANGIOGRAM on 1/4/18, hx of PAD with critical limb ischemia       Initial /78 (BP Location: Right arm, Patient Position: Chair, Cuff Size: Adult Regular)  Pulse 70  LMP  (LMP Unknown)  Breastfeeding? No Estimated body mass index is 25.8 kg/(m^2) as calculated from the following:    Height as of 1/4/18: 5' 6\" (1.676 m).    Weight as of 1/5/18: 159 lb 13.3 oz (72.5 kg).  Medication Reconciliation: complete     Ruba Vallecillo MA   "

## 2018-01-12 NOTE — LETTER
[facility] [place] [address]  [place] [address]  Phone: [phone]  Fax: [phone]      2018    Re: Helene Gibbs - 1935    Mrs. Gibbs is s/p left SFA/popliteal angioplasty and stenting. She had developed a right groin hematoma for which we kept her under overnight observation. Her hematoma is resolving and there is residual ecchymosis as would be expected. Her left little toe pain is better than before, the pain is not constant but she still has nocturnal pain. We will proceed with tibial approach left AT angioplasty.     LAWRENCE SNELL MD

## 2018-01-12 NOTE — MR AVS SNAPSHOT
After Visit Summary   1/12/2018    Helene Gibbs    MRN: [id number]           Patient Information     Date Of Birth          1935        Visit Information        Provider Department      1/12/2018 11:30 AM Pieter Watson MD [facility]      Today's Diagnoses     Critical ischemia of lower extremity    -  1       Follow-ups after your visit        Your next 10 appointments already scheduled     Mar 01, 2018  1:45 PM CST   [facility]   [facility] CV Echocardiography (Cardiovascular Imaging at [facility])    [address]  [address]  [place] [facility] [address]   [phone]           1. Please bring or wear a comfortable two-piece outfit. 2. You may eat, drink and take your normal medicines. 3. For any questions that cannot be answered, please contact the ordering physician            Mar 07, 2018  1:30 PM CST   [facility] EP RETURN with Angelica Grady APRN CNP   [facility] ([facility] [place] [facility])    [address]  [place] [address]   [phone]            Apr 17, 2018  1:00 PM CDT   Return Visit with  Oncology Nurse   [facility] ([facility])    [facility]  [address]  [place] [address]   [phone]            Apr 17, 2018  2:00 PM CDT   Return Visit with Estiven Cali MD   [facility] ([facility])    [facility]  [address]  [place] [facility] [address]   [phone]            May 01, 2018  1:15 PM CDT   Teletrace with STONE TECH1   [facility] ([facility] [place] [facility])    [address]  [place] [address]   [phone]            May 04, 2018 11:00 AM CDT   US EMIGDIO DOPPLER NO EXERCISE 1-2 LVLS BILAT with VUS1   [facility] Ultrasound  "([facility])    [address]  [place] [address]   [phone]           Please bring a list of your medicines (including vitamins, minerals and over-the-counter drugs). Also, tell your doctor about any allergies you may have. Wear comfortable clothes and leave your valuables at home.  No caffeine or tobacco for 1 hour prior to exam.  Please call the Imaging Department at your exam site with any questions.            May 04, 2018 11:30 AM CDT   Return Visit with Pieter Watson MD   [facility] ([facility])    [address]  [place] [address] [phone]              Who to contact     If you have questions or need follow up information about today's clinic visit or your schedule please contact [facility] directly at [phone].  Normal or non-critical lab and imaging results will be communicated to you by [employer], letter or phone within 4 business days after the clinic has received the results. If you do not hear from us within 7 days, please contact the clinic through [employer] or phone. If you have a critical or abnormal lab result, we will notify you by phone as soon as possible.  Submit refill requests through [employer] or call your pharmacy and they will forward the refill request to us. Please allow 3 business days for your refill to be completed.          Additional Information About Your Visit        [employer] Information     [employer] lets you send messages to your doctor, view your test results, renew your prescriptions, schedule appointments and more. To sign up, go to [email or web address] . Click on \"Log in\" on the left side of the screen, which will take you to the Welcome page. Then click on \"Sign up Now\" on the right side of the page.     You will be asked to enter the access code listed below, as well as some personal information. " Please follow the directions to create your username and password.     Your access code is: [email or web address]  Expires: 2018  5:15 PM     Your access code will  in 90 days. If you need help or a new code, please call your [place] clinic or [phone].        Care EveryWhere ID     This is your Care EveryWhere ID. This could be used by other organizations to access your [facility] medical records  [id number]        Your Vitals Were     Pulse Last Period Breastfeeding?             70 (LMP Unknown) No          Blood Pressure from Last 3 Encounters:   18 139/71   18 147/81   18 138/66    Weight from Last 3 Encounters:   18 169 lb (76.7 kg)   18 169 lb 9.6 oz (76.9 kg)   18 159 lb 13.3 oz (72.5 kg)              Today, you had the following     No orders found for display       Primary Care Provider Office Phone # Fax #    Neisha MINAYA MD [place] [phone] [address]  [place]                [place] [address]        Goals        General    start date: 14 I will weigh myself daily and if any weight gain or shortness of breath I will call the clinic. (pt-stated)     Notes - Note created  2014  3:59 PM by Margareth Pichardo, RN    As of today's date 2014 goal is met at 0 - 25%.   Goal Status:  Active        Equal Access to Services     [facility] AH: Hadii aad ku hadasho Caitlyn, waaxda luqadaha, qaybta kaalmada rachelyada, pamela mccullough . So [facility] [phone].    ATENCIÓN: Si habla español, tiene a gupta disposición servicios gratuitos de asistencia lingüística. Llame al [phone].    We comply with applicable federal civil rights laws and [place] laws. We do not discriminate on the basis of race, color, national origin, age, disability, sex, sexual orientation, or gender identity.            Thank you!     Thank you for choosing [facility] [place]  for your care. Our goal is always to provide you  with excellent care. Hearing back from our patients is one way we can continue to improve our services. Please take a few minutes to complete the written survey that you may receive in the mail after your visit with us. Thank you!             Your Updated Medication List - Protect others around you: Learn how to safely use, store and throw away your medicines at www.disposemymeds.org.          This list is accurate as of 1/12/18 11:59 PM.  Always use your most recent med list.                   Brand Name Dispense Instructions for use Diagnosis    albuterol 108 (90 BASE) MCG/ACT Inhaler    PROAIR HFA/PROVENTIL HFA/VENTOLIN HFA    1 Inhaler    Inhale 2 puffs into the lungs every 4 hours as needed for shortness of breath / dyspnea or wheezing    COPD (chronic obstructive pulmonary disease) (H)       apixaban ANTICOAGULANT 2.5 MG tablet    ELIQUIS    60 tablet    Take 1 tablet (2.5 mg) by mouth 2 times daily    Atrial fibrillation, unspecified type (H)       hydrocortisone 2.5 % cream    ANUSOL-HC    28 g    Place rectally 2 times daily as needed for hemorrhoids    External hemorrhoids       hydrOXYzine 25 MG capsule    VISTARIL    180 capsule    Take 1 capsule (25 mg) by mouth 2 times daily as needed for itching    Itching       lisinopril 10 MG tablet    PRINIVIL/ZESTRIL    90 tablet    Take 1 tablet (10 mg) by mouth daily    Essential hypertension       magnesium hydroxide 400 MG/5ML suspension    MILK OF MAGNESIA     Take 30 mLs by mouth At Bedtime        ondansetron 4 MG ODT tab    ZOFRAN ODT    30 tablet    Take 1 tablet (4 mg) by mouth every 6 hours as needed for nausea    Nausea       PRESERVISION/LUTEIN Caps      Take 1 capsule by mouth daily        ranitidine 150 MG tablet    ZANTAC    60 tablet    Take 1 tablet (150 mg) by mouth 2 times daily    Dyspepsia       simvastatin 10 MG tablet    ZOCOR    90 tablet    TAKE 1 TABLET (10 MG) BY MOUTH AT BEDTIME    Type 2 diabetes mellitus with diabetic nephropathy,  without long-term current use of insulin (H)

## 2018-01-14 DIAGNOSIS — R60.9 SWELLING: ICD-10-CM

## 2018-01-15 NOTE — TELEPHONE ENCOUNTER
"Requested Prescriptions   Pending Prescriptions Disp Refills     furosemide (LASIX) 20 MG tablet [Pharmacy Med Name: FUROSEMIDE 20 MG TABLET] 180 tablet 1     Sig: TAKE 1-2 TABS BY MOUTH ONCE DAILY. MAY REPEAT AFTER NOON IF NEEDED FOR WEIGHT GAIN/2+ EDEMA    Diuretics (Including Combos) Protocol Failed    1/14/2018  1:13 AM       Failed - Blood pressure under 140/90    BP Readings from Last 3 Encounters:   01/12/18 153/78   01/05/18 97/44   12/28/17 139/80                Failed - Normal serum creatinine on file in past 12 months    Recent Labs   Lab Test  01/05/18   0800   CR  1.11*             Passed - Recent or future visit with authorizing provider's specialty    Patient had office visit in the last year or has a visit in the next 30 days with authorizing provider.  See \"Patient Info\" tab in inbasket, or \"Choose Columns\" in Meds & Orders section of the refill encounter.              Passed - Patient is age 18 or older       Passed - No active pregancy on record       Passed - Normal serum potassium on file in past 12 months    Recent Labs   Lab Test  01/05/18   0800   POTASSIUM  4.1                   Passed - Normal serum sodium on file in past 12 months    Recent Labs   Lab Test  01/05/18   0800   NA  136             Passed - No positive pregnancy test in past 12 months             Disp Refills Start End PAM   furosemide (LASIX) 20 MG tablet 180 tablet 1 8/1/2017  No   Sig: TAKE 1-2 TABS BY MOUTH ONCE DAILY. MAY REPEAT AFTER NOON IF NEEDED FOR WEIGHT GAIN/2+ EDEMA   Patient taking differently: 20 mg daily            Last Written Prescription Date:  08/01/2017  Last Fill Quantity: 180,  # refills: 1  Last Office Visit with NATHAN, MARIAH or [facility] prescribing provider:  12/05/2017 Dr. Esparza    Future Office Visit:    Next 5 appointments (look out 90 days)     Jan 16, 2018  2:00 PM CST   Return Visit with Estiven Cali MD   [facility] ([facility])    [facility] [place]  [address] [address]   [phone]            Jan 25, 2018  1:00 PM CST   Office Visit with Neisha [facility] MD   [facility]    [address] [facility] [address]   [phone]

## 2018-01-16 ENCOUNTER — ONCOLOGY VISIT (OUTPATIENT)
Dept: ONCOLOGY | Facility: CLINIC | Age: 83
End: 2018-01-16
Attending: INTERNAL MEDICINE
Payer: MEDICARE

## 2018-01-16 VITALS
RESPIRATION RATE: 16 BRPM | WEIGHT: 169.6 LBS | DIASTOLIC BLOOD PRESSURE: 87 MMHG | OXYGEN SATURATION: 97 % | SYSTOLIC BLOOD PRESSURE: 154 MMHG | HEART RATE: 76 BPM | BODY MASS INDEX: 27.37 KG/M2

## 2018-01-16 DIAGNOSIS — D64.9 ANEMIA, UNSPECIFIED TYPE: Primary | ICD-10-CM

## 2018-01-16 PROCEDURE — 99213 OFFICE O/P EST LOW 20 MIN: CPT | Performed by: INTERNAL MEDICINE

## 2018-01-16 PROCEDURE — G0463 HOSPITAL OUTPT CLINIC VISIT: HCPCS

## 2018-01-16 RX ORDER — FUROSEMIDE 20 MG
TABLET ORAL
Qty: 180 TABLET | Refills: 1 | Status: ON HOLD | OUTPATIENT
Start: 2018-01-16 | End: 2018-05-08

## 2018-01-16 ASSESSMENT — PAIN SCALES - GENERAL: PAINLEVEL: MODERATE PAIN (4)

## 2018-01-16 NOTE — MR AVS SNAPSHOT
After Visit Summary   1/16/2018    Helene Gibbs    MRN: [id number]           Patient Information     Date Of Birth          1935        Visit Information        Provider Department      1/16/2018 2:00 PM Estiven Cali MD [facility]        Today's Diagnoses     Anemia, unspecified type    -  1      Care Instructions    Start multivitamin with iron once a day.  Follow up in 3 months with CBC and iron studies. Scheduled/Tressa    AVS not given to patient per patient request/Tressa          Follow-ups after your visit        Your next 10 appointments already scheduled     Jan 25, 2018  1:00 PM CST   Office Visit with Neisha Esparza MD   [facility])    [address] [address] [phone]           Bring a current list of meds and any records pertaining to this visit. For Physicals, please bring immunization records and any forms needing to be filled out. Please arrive 10 minutes early to complete paperwork.            Feb 02, 2018 11:15 AM CST   Return Visit with Pieter Watson MD   [facility] ([facility])    [address]  [facility] [address] [phone]            Mar 01, 2018  1:45 PM CST   Ech Complete with [id number]   [facility] CV Echocardiography (Cardiovascular Imaging at [facility])    [address]  [address]  [facility] [address] [phone]           1. Please bring or wear a comfortable two-piece outfit. 2. You may eat, drink and take your normal medicines. 3. For any questions that cannot be answered, please contact the ordering physician            Mar 07, 2018  1:30 PM CST   P EP RETURN with ALLEN Arrington CNP   [facility] ([facility])    [address]  [facility] [place] [address] [phone]            Apr 17, 2018   "1:00 PM CDT   Return Visit with  Oncology Nurse   [facility] ([facility])    [facility] [place]  [address]  [place] [address]   [phone]            Apr 17, 2018  2:00 PM CDT   Return Visit with Estiven Cali MD   [facility] ([facility])    [facility] [place]  [address]  [place] [address] [phone]            May 01, 2018  1:15 PM CDT   Teletrace with STONE TECH1   [facility] ([facility])    [address]  [place] [address] [phone]              Who to contact     If you have questions or need follow up information about today's clinic visit or your schedule please contact [facility] directly at [phone].  Normal or non-critical lab and imaging results will be communicated to you by [employer], letter or phone within 4 business days after the clinic has received the results. If you do not hear from us within 7 days, please contact the clinic through [employer] or phone. If you have a critical or abnormal lab result, we will notify you by phone as soon as possible.  Submit refill requests through [employer] or call your pharmacy and they will forward the refill request to us. Please allow 3 business days for your refill to be completed.          Additional Information About Your Visit        [employer] Information     [employer] lets you send messages to your doctor, view your test results, renew your prescriptions, schedule appointments and more. To sign up, go to [email or web address] . Click on \"Log in\" on the left side of the screen, which will take you to the Welcome page. Then click on \"Sign up Now\" on the right side of the page.     You will be asked to enter the access code listed below, as well as some personal information. Please follow the directions to create your username and password.     Your " access code is: [email or web address]  Expires: 2018  5:15 PM     Your access code will  in 90 days. If you need help or a new code, please call your [place] clinic or [phone].        Care EveryWhere ID     This is your Care EveryWhere ID. This could be used by other organizations to access your [place] medical records  [id number]        Your Vitals Were     Pulse Respirations Last Period Pulse Oximetry BMI (Body Mass Index)       76 16 (LMP Unknown) 97% 27.37 kg/m2        Blood Pressure from Last 3 Encounters:   No data found for BP    Weight from Last 3 Encounters:   No data found for Wt              Today, you had the following     No orders found for display       Primary Care Provider Office Phone # Fax #    Neisha Esparza [phone] [address]  [place]                [place] [address]        Goals        General    start date: 14 I will weigh myself daily and if any weight gain or shortness of breath I will call the clinic. (pt-stated)     Notes - Note created  2014  3:59 PM by Margareth Pichardo, RN    As of today's date 2014 goal is met at 0 - 25%.   Goal Status:  Active        Equal Access to Services     [facility] AH: Hadii aad ku hadasho Soomaali, waaxda luqadaha, qaybta kaalmada adeegyada, waxay idiin hayana paulan rachel farris lafili ah. So [facility] [phone].    ATENCIÓN: Si habla español, tiene a gupta disposición servicios gratuitos de asistencia lingüística. Llame al [phone].    We comply with applicable federal civil rights laws and [place] laws. We do not discriminate on the basis of race, color, national origin, age, disability, sex, sexual orientation, or gender identity.            Thank you!     Thank you for choosing [facility]  for your care. Our goal is always to provide you with excellent care. Hearing back from our patients is one way we can continue to improve our services. Please take a few minutes to complete the  written survey that you may receive in the mail after your visit with us. Thank you!             Your Updated Medication List - Protect others around you: Learn how to safely use, store and throw away your medicines at www.ZipsceneemCinemureds.org.          This list is accurate as of: 1/16/18 11:59 PM.  Always use your most recent med list.                   Brand Name Dispense Instructions for use Diagnosis    albuterol 108 (90 BASE) MCG/ACT Inhaler    PROAIR HFA/PROVENTIL HFA/VENTOLIN HFA    1 Inhaler    Inhale 2 puffs into the lungs every 4 hours as needed for shortness of breath / dyspnea or wheezing    COPD (chronic obstructive pulmonary disease) (H)       apixaban ANTICOAGULANT 2.5 MG tablet    ELIQUIS    60 tablet    Take 1 tablet (2.5 mg) by mouth 2 times daily    Atrial fibrillation, unspecified type (H)       betamethasone valerate 0.1 % cream    VALISONE     Apply topically 2 times daily as needed (Use Sparingly)        fluticasone 50 MCG/ACT spray    FLONASE     Spray 1-2 sprays into both nostrils every evening        furosemide 20 MG tablet    LASIX    180 tablet    TAKE 1-2 TABS BY MOUTH ONCE DAILY. MAY REPEAT AFTER NOON IF NEEDED FOR WEIGHT GAIN/2+ EDEMA    Swelling       GABAPENTIN PO      Take 300 mg by mouth 2 times daily        GLUCOTROL PO      Take 5 mg by mouth every morning (before breakfast)        HYDROcodone-acetaminophen 5-325 MG per tablet    NORCO    90 tablet    Take 1 tablet by mouth every 6 hours as needed for moderate to severe pain    Intractable back pain, Scoliosis of lumbar spine, unspecified scoliosis type, Chronic midline low back pain with sciatica, sciatica laterality unspecified       hydrocortisone 2.5 % cream    ANUSOL-HC    28 g    Place rectally 2 times daily as needed for hemorrhoids    External hemorrhoids       hydrOXYzine 25 MG capsule    VISTARIL    180 capsule    Take 1 capsule (25 mg) by mouth 2 times daily as needed for itching    Itching       lisinopril 10 MG tablet     PRINIVIL/ZESTRIL    90 tablet    Take 1 tablet (10 mg) by mouth daily    Essential hypertension       magnesium hydroxide 400 MG/5ML suspension    MILK OF MAGNESIA     Take 30 mLs by mouth At Bedtime        nystatin cream    MYCOSTATIN     Apply topically 2 times daily as needed for dry skin        ondansetron 4 MG ODT tab    ZOFRAN ODT    30 tablet    Take 1 tablet (4 mg) by mouth every 6 hours as needed for nausea    Nausea       PRESERVISION/LUTEIN Caps      Take 1 capsule by mouth daily        ranitidine 150 MG tablet    ZANTAC    60 tablet    Take 1 tablet (150 mg) by mouth 2 times daily    Dyspepsia       simvastatin 10 MG tablet    ZOCOR    90 tablet    TAKE 1 TABLET (10 MG) BY MOUTH AT BEDTIME    Type 2 diabetes mellitus with diabetic nephropathy, without long-term current use of insulin (H)       tiotropium 18 MCG capsule    SPIRIVA     Inhale 18 mcg into the lungs every evening

## 2018-01-16 NOTE — Clinical Note
"    1/16/2018         RE: Helene Gibbs  [address]   [facility] [address]        Dear Colleague,    Thank you for referring your patient, Helene Gibbs, to the [facility]. Please see a copy of my visit note below.    Oncology Rooming Note    January 16, 2018 2:01 PM   Helene Gibbs is a 82 year old female who presents for:    Chief Complaint   Patient presents with     Oncology Clinic Visit     Tubular adenoma     Initial Vitals: /87 (BP Location: Right arm, Patient Position: Sitting, Cuff Size: Adult Regular)  Pulse 76  Resp 16  Wt 76.9 kg (169 lb 9.6 oz)  LMP  (LMP Unknown)  SpO2 97%  BMI 27.37 kg/m2 Estimated body mass index is 27.37 kg/(m^2) as calculated from the following:    Height as of 1/4/18: 1.676 m (5' 6\").    Weight as of this encounter: 76.9 kg (169 lb 9.6 oz). Body surface area is 1.89 meters squared.  Moderate Pain (4) Comment: pain in feet    No LMP recorded (lmp unknown). Patient is postmenopausal.  Allergies reviewed: Yes  Medications reviewed: Yes    Medications: Medication refills not needed today.  Pharmacy name entered into [facility]:    [facility] [place], [place] - [id number] [place] [address] & [id number] - [place], [place] - [address]  [id number] - [place], [place] - [address] [facility]    Clinical concerns: None                             4 minutes for nursing intake (face to face time)     Radha Sadler MA              HEMATOLOGY HISTORY: Ms. Helene Gibbs is a female with anemia and MGUS (IgM lamda).   1.  On 09/26/2016, hemoglobin of 15.7.   2.  On 04/25/2017, hemoglobin of 11.8 with MCV of 93.   3.  On 08/09/2017:  -WBC of 7.4, hemoglobin of 6.6 and platelet of 299.  MCV of 79.  RDW of 17.1.   -Creatinine of 1.46.    -Calcium of 10.0.    -LFT is normal.    -Stool for occult blood positive.   4.  On 08/10/2017:  -Iron of 81 and saturation of 18%.    -Vitamin B12 1076.   -Folate of 17.8.   5.  EGD on 08/10/2017 " reveals erythematous mucosa in the antrum.  Normal esophagus and duodenum. Biopsy reveals focal chronic inflammation.  No H. pylori.  No malignancy.   6.  Colonoscopy on 10/07/2016 had revealed tiny polyps and diverticulosis.  Otherwise normal.  Pathology revealed tubular adenoma without any high-grade dysplasia.   7. Multiple labs were done on is 08/22/2017.   -Erythropoietin of 138.   -LDH of 188.   -Serum protein electrophoresis reveals M spike of 0.1.   -Immunofixation reveals IgM lambda, IgM level of 197.   8. Bone marrow aspiration biopsy on 08/29/2017 reveals normocellular bone marrow with 20% cellularity.  There is trilineage hematopoietic maturation.  No evidence of dysplasia.  No increase in blasts.  Flow cytometry is normal.  Iron stains revealed decreased storage iron.   9. On 09/08/2017, ferritin of 6 and hemoglobin of 8.1.    10. IV Injectafer for 2 doses on 09/15/2017 and 09/22/2017.      SUBJECTIVE:    Ms. Gibbs is an 82-year-old female with anemia and MGUS.  For anemia, she had multiple investigations as described above.  Her anemia is multifactorial from iron deficiency, chronic kidney disease, anemia of chronic disease and hypocellular marrow.  For iron deficiency, she has had EGD and colonoscopy done.  EGD in 08/2017 had revealed erythematous mucosa in the antrum.  Colonoscopy had revealed some tiny polyps.  She has received IV Injectafer in 09/2017.  She had responded to it.      The patient lately has been having some problem with peripheral vascular disease.  She has left lower extremity claudication.  She had an angiogram and left SFA stenting done in January,2018.  The patient says that after that she had some bleeding.  She still has some ecchymosis in the abdominal wall.      REVIEW OF SYSTEMS:  The patient lately has been more tired.  No headache.  Some dizziness.  No chest pain.  No difficulty breathing.  Some lower abdominal discomfort.  No nausea or vomiting.  No blood in the urine  or stool.  She gets some pain in the left leg toes.      PHYSICAL EXAMINATION:   GENERAL:  She was alert and oriented x3.   VITAL SIGNS:  Reviewed.   ABDOMEN:  Soft and nontender.  There is abdominal wall ecchymosis.      LABORATORY DATA:  Reviewed.  These were done on 2018.     -- Hemoglobin of 10.2 with MCV of 93.  Normal WBC and platelets.   -- Iron of 47, saturation of 13% and ferritin of 37.      ASSESSMENT:   1.  An 82-year-old female with mild normochromic, normocytic anemia.   2.  History of iron deficiency.   3.  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   4.  Multiple medical problems including chronic kidney disease, hypertension, diabetes mellitus and peripheral vascular disease.      PLAN:   1.  Labs were reviewed with the patient.  I explained to her that she has become mildly anemic.  Iron and ferritin also decreased.  Saturation is mildly low.      The patient recently had angiogram done.  After that, she had some hematoma.  Her anemia and decreasing iron stores could be due to that.     I told her that at this time she does not need any IV iron. Discussed regarding oral iron.  She gets abdominal side effects.  I told her to take a multivitamin with oral iron.  Hopefully, she can tolerate it.  She will take it once a day.      2.  We will monitor her labs.  I will see her in 3 months' time with CBC and iron studies.  If she develops iron-deficiency anemia, we will treat her with IV iron.  That has helped before.      3.  She had multiple questions, which were all answered.  I will see her in 3 months.  Advised her to return sooner if she has worsening weakness, bleeding, muscle cramps, dizziness or any other concerns.         ALEE TIAN MD             D: 2018 16:47   T: 2018 02:33   MT: DARRELL      Name:     PATRICIA BOBO   MRN:      [id number]        Account:      [id number]   :      1935           Visit Date:   2018      Document:  [id number]        Again, thank you for allowing me to participate in the care of your patient.        Sincerely,        Estiven Cali MD

## 2018-01-16 NOTE — PROGRESS NOTES
"Oncology Rooming Note    January 16, 2018 2:01 PM   Helene Gibbs is a 82 year old female who presents for:    Chief Complaint   Patient presents with     Oncology Clinic Visit     Tubular adenoma     Initial Vitals: /87 (BP Location: Right arm, Patient Position: Sitting, Cuff Size: Adult Regular)  Pulse 76  Resp 16  Wt 76.9 kg (169 lb 9.6 oz)  LMP  (LMP Unknown)  SpO2 97%  BMI 27.37 kg/m2 Estimated body mass index is 27.37 kg/(m^2) as calculated from the following:    Height as of 1/4/18: 1.676 m (5' 6\").    Weight as of this encounter: 76.9 kg (169 lb 9.6 oz). Body surface area is 1.89 meters squared.  Moderate Pain (4) Comment: pain in feet    No LMP recorded (lmp unknown). Patient is postmenopausal.  Allergies reviewed: Yes  Medications reviewed: Yes    Medications: Medication refills not needed today.  Pharmacy name entered into [facility]:    [place], [place] - [address] [id number] - [place], [place] - [address]  [facility] #[id number] - [place], [place] - [address] [facility]    Clinical concerns: None                             4 minutes for nursing intake (face to face time)     Radha Sadler MA            "

## 2018-01-16 NOTE — PATIENT INSTRUCTIONS
Start multivitamin with iron once a day.  Follow up in 3 months with CBC and iron studies. Scheduled/Annabel    AVS not given to patient per patient request/Annabel

## 2018-01-17 NOTE — PROGRESS NOTES
HEMATOLOGY HISTORY: Ms. Helene Gibbs is a female with anemia and MGUS (IgM lamda).   1.  On 09/26/2016, hemoglobin of 15.7.   2.  On 04/25/2017, hemoglobin of 11.8 with MCV of 93.   3.  On 08/09/2017:  -WBC of 7.4, hemoglobin of 6.6 and platelet of 299.  MCV of 79.  RDW of 17.1.   -Creatinine of 1.46.    -Calcium of 10.0.    -LFT is normal.    -Stool for occult blood positive.   4.  On 08/10/2017:  -Iron of 81 and saturation of 18%.    -Vitamin B12 1076.   -Folate of 17.8.   5.  EGD on 08/10/2017 reveals erythematous mucosa in the antrum.  Normal esophagus and duodenum. Biopsy reveals focal chronic inflammation.  No H. pylori.  No malignancy.   6.  Colonoscopy on 10/07/2016 had revealed tiny polyps and diverticulosis.  Otherwise normal.  Pathology revealed tubular adenoma without any high-grade dysplasia.   7. Multiple labs were done on is 08/22/2017.   -Erythropoietin of 138.   -LDH of 188.   -Serum protein electrophoresis reveals M spike of 0.1.   -Immunofixation reveals IgM lambda, IgM level of 197.   8. Bone marrow aspiration biopsy on 08/29/2017 reveals normocellular bone marrow with 20% cellularity.  There is trilineage hematopoietic maturation.  No evidence of dysplasia.  No increase in blasts.  Flow cytometry is normal.  Iron stains revealed decreased storage iron.   9. On 09/08/2017, ferritin of 6 and hemoglobin of 8.1.    10. IV Injectafer for 2 doses on 09/15/2017 and 09/22/2017.      SUBJECTIVE:    Ms. Gibbs is an 82-year-old female with anemia and MGUS.  For anemia, she had multiple investigations as described above.  Her anemia is multifactorial from iron deficiency, chronic kidney disease, anemia of chronic disease and hypocellular marrow.  For iron deficiency, she has had EGD and colonoscopy done.  EGD in 08/2017 had revealed erythematous mucosa in the antrum.  Colonoscopy had revealed some tiny polyps.  She has received IV Injectafer in 09/2017.  She had responded to it.      The patient lately  has been having some problem with peripheral vascular disease.  She has left lower extremity claudication.  She had an angiogram and left SFA stenting done in January,2018.  The patient says that after that she had some bleeding.  She still has some ecchymosis in the abdominal wall.      REVIEW OF SYSTEMS:  The patient lately has been more tired.  No headache.  Some dizziness.  No chest pain.  No difficulty breathing.  Some lower abdominal discomfort.  No nausea or vomiting.  No blood in the urine or stool.  She gets some pain in the left leg toes.      PHYSICAL EXAMINATION:   GENERAL:  She was alert and oriented x3.   VITAL SIGNS:  Reviewed.   ABDOMEN:  Soft and nontender.  There is abdominal wall ecchymosis.      LABORATORY DATA:  Reviewed.  These were done on 01/09/2018.     -- Hemoglobin of 10.2 with MCV of 93.  Normal WBC and platelets.   -- Iron of 47, saturation of 13% and ferritin of 37.      ASSESSMENT:   1.  An 82-year-old female with mild normochromic, normocytic anemia.   2.  History of iron deficiency.   3.  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   4.  Multiple medical problems including chronic kidney disease, hypertension, diabetes mellitus and peripheral vascular disease.      PLAN:   1.  Labs were reviewed with the patient.  I explained to her that she has become mildly anemic.  Iron and ferritin also decreased.  Saturation is mildly low.      The patient recently had angiogram done.  After that, she had some hematoma.  Her anemia and decreasing iron stores could be due to that.     I told her that at this time she does not need any IV iron. Discussed regarding oral iron.  She gets abdominal side effects.  I told her to take a multivitamin with oral iron.  Hopefully, she can tolerate it.  She will take it once a day.      2.  We will monitor her labs.  I will see her in 3 months' time with CBC and iron studies.  If she develops iron-deficiency anemia, we will treat her with  IV iron.  That has helped before.      3.  She had multiple questions, which were all answered.  I will see her in 3 months.  Advised her to return sooner if she has worsening weakness, bleeding, muscle cramps, dizziness or any other concerns.         ALEE TIAN MD             D: 2018 16:47   T: 2018 02:33   MT: DARRELL      Name:     PATRICIA BOBO   MRN:      [id number]        Account:      [id number]   :      1935           Visit Date:   2018      Document: [id number]

## 2018-01-18 ENCOUNTER — APPOINTMENT (OUTPATIENT)
Dept: SURGERY | Facility: PHYSICIAN GROUP | Age: 83
End: 2018-01-18
Payer: MEDICARE

## 2018-01-18 ENCOUNTER — APPOINTMENT (OUTPATIENT)
Dept: INTERVENTIONAL RADIOLOGY/VASCULAR | Facility: CLINIC | Age: 83
End: 2018-01-18
Attending: SURGERY
Payer: MEDICARE

## 2018-01-18 ENCOUNTER — HOSPITAL ENCOUNTER (OUTPATIENT)
Facility: CLINIC | Age: 83
Discharge: HOME | End: 2018-01-18
Attending: SURGERY | Admitting: SURGERY
Payer: MEDICARE

## 2018-01-18 VITALS
DIASTOLIC BLOOD PRESSURE: 66 MMHG | SYSTOLIC BLOOD PRESSURE: 138 MMHG | TEMPERATURE: 97.9 F | OXYGEN SATURATION: 97 % | HEART RATE: 70 BPM | RESPIRATION RATE: 16 BRPM

## 2018-01-18 DIAGNOSIS — I70.212 ATHEROSCLEROSIS OF NATIVE ARTERY OF LEFT LOWER EXTREMITY WITH INTERMITTENT CLAUDICATION (H): ICD-10-CM

## 2018-01-18 LAB
CHOLEST SERPL-MCNC: 126 MG/DL
GLUCOSE BLDC GLUCOMTR-MCNC: 84 MG/DL (ref 70–99)
HDLC SERPL-MCNC: 71 MG/DL
LDLC SERPL CALC-MCNC: 40 MG/DL
NONHDLC SERPL-MCNC: 55 MG/DL
TRIGL SERPL-MCNC: 76 MG/DL

## 2018-01-18 PROCEDURE — 99153 MOD SED SAME PHYS/QHP EA: CPT | Performed by: SURGERY

## 2018-01-18 PROCEDURE — 27210906 ZZH KIT CR8

## 2018-01-18 PROCEDURE — 82962 GLUCOSE BLOOD TEST: CPT

## 2018-01-18 PROCEDURE — 40000853 ZZH STATISTIC ANGIOGRAM, STENT, VERTEBRO PLASTY

## 2018-01-18 PROCEDURE — 27210886 ZZH ACCESSORY CR5

## 2018-01-18 PROCEDURE — 40000141 ZZH STATISTIC PERIPHERAL IV START W/O US GUIDANCE

## 2018-01-18 PROCEDURE — 76937 US GUIDE VASCULAR ACCESS: CPT | Mod: 26 | Performed by: SURGERY

## 2018-01-18 PROCEDURE — 80061 LIPID PANEL: CPT | Performed by: SURGERY

## 2018-01-18 PROCEDURE — 82565 ASSAY OF CREATININE: CPT | Performed by: SURGERY

## 2018-01-18 PROCEDURE — 75710 ARTERY X-RAYS ARM/LEG: CPT | Mod: LT

## 2018-01-18 PROCEDURE — 25000128 H RX IP 250 OP 636: Performed by: SURGERY

## 2018-01-18 PROCEDURE — C1769 GUIDE WIRE: HCPCS

## 2018-01-18 PROCEDURE — 25000128 H RX IP 250 OP 636

## 2018-01-18 PROCEDURE — C1887 CATHETER, GUIDING: HCPCS

## 2018-01-18 PROCEDURE — 36415 COLL VENOUS BLD VENIPUNCTURE: CPT | Performed by: SURGERY

## 2018-01-18 PROCEDURE — 25000125 ZZHC RX 250

## 2018-01-18 PROCEDURE — 27210845 ZZH DEVICE INFLATION CR5

## 2018-01-18 PROCEDURE — 99152 MOD SED SAME PHYS/QHP 5/>YRS: CPT | Performed by: SURGERY

## 2018-01-18 PROCEDURE — 37228 ZZHC REVASC TIB/PERON ART, ANGIOPLASTY INIT VESSEL: CPT | Mod: LT | Performed by: SURGERY

## 2018-01-18 RX ORDER — PROTAMINE SULFATE 10 MG/ML
INJECTION, SOLUTION INTRAVENOUS
Status: COMPLETED
Start: 2018-01-18 | End: 2018-01-18

## 2018-01-18 RX ORDER — HEPARIN SODIUM 1000 [USP'U]/ML
INJECTION, SOLUTION INTRAVENOUS; SUBCUTANEOUS
Status: DISCONTINUED
Start: 2018-01-18 | End: 2018-01-18 | Stop reason: HOSPADM

## 2018-01-18 RX ORDER — FENTANYL CITRATE 50 UG/ML
INJECTION, SOLUTION INTRAMUSCULAR; INTRAVENOUS
Status: DISCONTINUED
Start: 2018-01-18 | End: 2018-01-18 | Stop reason: HOSPADM

## 2018-01-18 RX ORDER — IODIXANOL 320 MG/ML
50 INJECTION, SOLUTION INTRAVASCULAR ONCE
Status: COMPLETED | OUTPATIENT
Start: 2018-01-18 | End: 2018-01-18

## 2018-01-18 RX ORDER — TIOTROPIUM BROMIDE 18 UG/1
18 CAPSULE ORAL; RESPIRATORY (INHALATION) EVERY EVENING
COMMUNITY
End: 2018-01-01

## 2018-01-18 RX ORDER — NALOXONE HYDROCHLORIDE 0.4 MG/ML
.1-.4 INJECTION, SOLUTION INTRAMUSCULAR; INTRAVENOUS; SUBCUTANEOUS
Status: DISCONTINUED | OUTPATIENT
Start: 2018-01-18 | End: 2018-01-18 | Stop reason: HOSPADM

## 2018-01-18 RX ORDER — PROTAMINE SULFATE 10 MG/ML
20 INJECTION, SOLUTION INTRAVENOUS ONCE
Status: COMPLETED | OUTPATIENT
Start: 2018-01-18 | End: 2018-01-18

## 2018-01-18 RX ORDER — FLUMAZENIL 0.1 MG/ML
0.2 INJECTION, SOLUTION INTRAVENOUS
Status: DISCONTINUED | OUTPATIENT
Start: 2018-01-18 | End: 2018-01-18 | Stop reason: HOSPADM

## 2018-01-18 RX ORDER — LIDOCAINE HYDROCHLORIDE 10 MG/ML
INJECTION, SOLUTION INFILTRATION; PERINEURAL
Status: DISCONTINUED
Start: 2018-01-18 | End: 2018-01-18 | Stop reason: HOSPADM

## 2018-01-18 RX ORDER — LIDOCAINE HYDROCHLORIDE 10 MG/ML
1-30 INJECTION, SOLUTION EPIDURAL; INFILTRATION; INTRACAUDAL; PERINEURAL
Status: COMPLETED | OUTPATIENT
Start: 2018-01-18 | End: 2018-01-18

## 2018-01-18 RX ORDER — BETAMETHASONE VALERATE 1.2 MG/G
CREAM TOPICAL 2 TIMES DAILY PRN
Status: ON HOLD | COMMUNITY
End: 2018-05-06

## 2018-01-18 RX ORDER — LIDOCAINE 40 MG/G
CREAM TOPICAL
Status: DISCONTINUED | OUTPATIENT
Start: 2018-01-18 | End: 2018-01-18 | Stop reason: HOSPADM

## 2018-01-18 RX ORDER — FLUTICASONE PROPIONATE 50 MCG
1-2 SPRAY, SUSPENSION (ML) NASAL EVERY EVENING
COMMUNITY
End: 2018-01-01

## 2018-01-18 RX ORDER — HEPARIN SODIUM 1000 [USP'U]/ML
500-6000 INJECTION, SOLUTION INTRAVENOUS; SUBCUTANEOUS
Status: COMPLETED | OUTPATIENT
Start: 2018-01-18 | End: 2018-01-18

## 2018-01-18 RX ORDER — SODIUM CHLORIDE 9 MG/ML
INJECTION, SOLUTION INTRAVENOUS CONTINUOUS
Status: DISCONTINUED | OUTPATIENT
Start: 2018-01-18 | End: 2018-01-18 | Stop reason: HOSPADM

## 2018-01-18 RX ORDER — FENTANYL CITRATE 50 UG/ML
25-50 INJECTION, SOLUTION INTRAMUSCULAR; INTRAVENOUS EVERY 5 MIN PRN
Status: DISCONTINUED | OUTPATIENT
Start: 2018-01-18 | End: 2018-01-18 | Stop reason: HOSPADM

## 2018-01-18 RX ORDER — ACETAMINOPHEN 500 MG
500-1000 TABLET ORAL EVERY 6 HOURS PRN
Status: DISCONTINUED | OUTPATIENT
Start: 2018-01-18 | End: 2018-01-18 | Stop reason: HOSPADM

## 2018-01-18 RX ORDER — NYSTATIN 100000 U/G
CREAM TOPICAL 2 TIMES DAILY PRN
Status: ON HOLD | COMMUNITY
End: 2018-05-06

## 2018-01-18 RX ADMIN — MIDAZOLAM HYDROCHLORIDE 0.5 MG: 1 INJECTION, SOLUTION INTRAMUSCULAR; INTRAVENOUS at 14:39

## 2018-01-18 RX ADMIN — MIDAZOLAM HYDROCHLORIDE 0.5 MG: 1 INJECTION, SOLUTION INTRAMUSCULAR; INTRAVENOUS at 14:56

## 2018-01-18 RX ADMIN — LIDOCAINE HYDROCHLORIDE 30 MG: 10 INJECTION, SOLUTION EPIDURAL; INFILTRATION; INTRACAUDAL; PERINEURAL at 15:51

## 2018-01-18 RX ADMIN — FENTANYL CITRATE 50 MCG: 50 INJECTION, SOLUTION INTRAMUSCULAR; INTRAVENOUS at 14:21

## 2018-01-18 RX ADMIN — FENTANYL CITRATE 25 MCG: 50 INJECTION, SOLUTION INTRAMUSCULAR; INTRAVENOUS at 14:56

## 2018-01-18 RX ADMIN — PROTAMINE SULFATE 20 MG: 10 INJECTION, SOLUTION INTRAVENOUS at 15:50

## 2018-01-18 RX ADMIN — MIDAZOLAM HYDROCHLORIDE 0.5 MG: 1 INJECTION, SOLUTION INTRAMUSCULAR; INTRAVENOUS at 14:35

## 2018-01-18 RX ADMIN — MIDAZOLAM HYDROCHLORIDE 1 MG: 1 INJECTION, SOLUTION INTRAMUSCULAR; INTRAVENOUS at 14:21

## 2018-01-18 RX ADMIN — SODIUM CHLORIDE: 9 INJECTION, SOLUTION INTRAVENOUS at 12:43

## 2018-01-18 RX ADMIN — HEPARIN SODIUM 4000 UNITS: 1000 INJECTION, SOLUTION INTRAVENOUS; SUBCUTANEOUS at 14:59

## 2018-01-18 RX ADMIN — IODIXANOL 25 ML: 320 INJECTION, SOLUTION INTRAVASCULAR at 15:54

## 2018-01-18 RX ADMIN — LIDOCAINE HYDROCHLORIDE 30 MG: 10 INJECTION, SOLUTION INFILTRATION; PERINEURAL at 15:51

## 2018-01-18 RX ADMIN — FENTANYL CITRATE 50 MCG: 50 INJECTION, SOLUTION INTRAMUSCULAR; INTRAVENOUS at 14:34

## 2018-01-18 RX ADMIN — FENTANYL CITRATE 25 MCG: 50 INJECTION, SOLUTION INTRAMUSCULAR; INTRAVENOUS at 15:37

## 2018-01-18 NOTE — PROGRESS NOTES
1610 Pt returned from IR. Gauze drsg CDI to left pedal puncture site. No oozing or hematoma noted. Area soft & flat. Pt denies pain. Pt instructed on activity restrictions while on bedrest. Verbal understanding received from pt. No family present at this time. Great niece will pick pt up & will stay with pt tonight.   1700 Pt taking diet & flds well. No complaints.  1910 OOB - steady on feet. Ambulated in halls with walker to bathroom with good jamie. No change in puncture site assessment with activity.  1925 Discharge teaching & instructions given to pt w/ verbal understanding received. All questions & concerns addressed.  1940 Pt discharged per w/c to private vehicle. All personal belongings taken w/ pt.

## 2018-01-18 NOTE — IP AVS SNAPSHOT
[place] [address]    [place] [address]    Phone:  [phone]                                       After Visit Summary   1/18/2018    Helene Gibbs    MRN: [id number]           After Visit Summary Signature Page     I have received my discharge instructions, and my questions have been answered. I have discussed any challenges I see with this plan with the nurse or doctor.    ..........................................................................................................................................  Patient/Patient Representative Signature      ..........................................................................................................................................  Patient Representative Print Name and Relationship to Patient    ..................................................               ................................................  Date                                            Time    ..........................................................................................................................................  Reviewed by Signature/Title    ...................................................              ..............................................  Date                                                            Time

## 2018-01-18 NOTE — DISCHARGE INSTRUCTIONS
Peripheral Angiogram Discharge Instructions     After you go home:      Have an adult stay with you until tomorrow.    Drink extra fluids for 2 days.    You may resume your normal diet.    No smoking       For 24 hours - due to the sedation you received:    Relax and take it easy.    Do NOT make any important or legal decisions.    Do NOT drive or operate machines at home or at work.    Do NOT drink alcohol.    Care of Foot Puncture Site:      For the first 24 hrs - check the puncture site every 1-2 hours while awake.    Remove the bandaid after 24 hours. If there is minor oozing, apply another bandaid and remove it after 12 hours.    It is normal to have a small bruise or pea size lump at the site.    You may shower tomorrow.  Do NOT take a bath, or use a hot tub or pool for at least 3 days. Do NOT scrub the site. Do not use lotion or powder near the puncture site.     Activity:            For 2 days:    Do NOT do any heavy activity such as exercise, lifting, or straining.     No housework, yard work or any activity that make you sweat    Do NOT lift more than 10 pounds    Bleeding:      If you start bleeding from the site in your foot, lie down flat and press firmly on/above the site for 10 minutes.     Once bleeding stops, lay flat for 2 hours.     Call the [facility] as soon as you can.       Call 911 right away if you have heavy bleeding or bleeding that does not stop.      Medicines:      Take your medications, including blood thinners, unless your provider tells you not to.      If you have stopped any medicines, check with your provider about when to restart them.        Follow Up Appointments:      Follow up with [facility] as directed.    Call the clinic if:      You have increased pain or a large or growing hard lump around the site.    The site is red, swollen, hot or tender.    Blood or fluid is draining from the site.    You have chills or a fever greater than 101 F (38  C).    Your foot turns feels numb, cool or changes color.    You have hives, a rash or unusual itching.    Any questions or concerns.    Other Instructions:      If you received a stent - carry your stent card with you at all times.      If you have questions or your original symptoms do not improve, call:         [facility] @ [phone]

## 2018-01-18 NOTE — IP AVS SNAPSHOT
MRN:[id number]                      After Visit Summary   1/18/2018    Helene Gibbs    MRN: [id number]           Visit Information        Department      1/18/2018 10:44 AM [facility]          Review of your medicines      UNREVIEWED medicines. Ask your doctor about these medicines        Dose / Directions    albuterol 108 (90 BASE) MCG/ACT Inhaler   Commonly known as:  PROAIR HFA/PROVENTIL HFA/VENTOLIN HFA   Used for:  COPD (chronic obstructive pulmonary disease) (H)        Dose:  2 puff   Inhale 2 puffs into the lungs every 4 hours as needed for shortness of breath / dyspnea or wheezing   Quantity:  1 Inhaler   Refills:  5       apixaban ANTICOAGULANT 2.5 MG tablet   Commonly known as:  ELIQUIS   Used for:  Atrial fibrillation, unspecified type (H)        Dose:  2.5 mg   Take 1 tablet (2.5 mg) by mouth 2 times daily   Quantity:  60 tablet   Refills:  3       betamethasone valerate 0.1 % cream   Commonly known as:  VALISONE        Apply topically 2 times daily as needed (Use Sparingly)   Refills:  0       fluticasone 50 MCG/ACT spray   Commonly known as:  FLONASE        Dose:  1-2 spray   Spray 1-2 sprays into both nostrils every evening   Refills:  0       furosemide 20 MG tablet   Commonly known as:  LASIX   Used for:  Swelling        TAKE 1-2 TABS BY MOUTH ONCE DAILY. MAY REPEAT AFTER NOON IF NEEDED FOR WEIGHT GAIN/2+ EDEMA   Quantity:  180 tablet   Refills:  1       GABAPENTIN PO        Dose:  300 mg   Take 300 mg by mouth 2 times daily   Refills:  0       GLUCOTROL PO        Dose:  5 mg   Take 5 mg by mouth every morning (before breakfast)   Refills:  0       HYDROcodone-acetaminophen 5-325 MG per tablet   Commonly known as:  NORCO   Used for:  Intractable back pain, Scoliosis of lumbar spine, unspecified scoliosis type, Chronic midline low back pain with sciatica, sciatica laterality unspecified        Dose:  1 tablet   Take 1 tablet by mouth every 6 hours as needed  for moderate to severe pain   Quantity:  90 tablet   Refills:  0       hydrocortisone 2.5 % cream   Commonly known as:  ANUSOL-HC   Used for:  External hemorrhoids        Place rectally 2 times daily as needed for hemorrhoids   Quantity:  28 g   Refills:  1       hydrOXYzine 25 MG capsule   Commonly known as:  VISTARIL   Used for:  Itching        Dose:  25 mg   Take 1 capsule (25 mg) by mouth 2 times daily as needed for itching   Quantity:  180 capsule   Refills:  1       lisinopril 10 MG tablet   Commonly known as:  PRINIVIL/ZESTRIL   Used for:  Essential hypertension        Dose:  10 mg   Take 1 tablet (10 mg) by mouth daily   Quantity:  90 tablet   Refills:  1       magnesium hydroxide 400 MG/5ML suspension   Commonly known as:  MILK OF MAGNESIA        Dose:  30 mL   Take 30 mLs by mouth At Bedtime   Refills:  0       nystatin cream   Commonly known as:  MYCOSTATIN        Apply topically 2 times daily as needed for dry skin   Refills:  0       ondansetron 4 MG ODT tab   Commonly known as:  ZOFRAN ODT   Used for:  Nausea        Dose:  4 mg   Take 1 tablet (4 mg) by mouth every 6 hours as needed for nausea   Quantity:  30 tablet   Refills:  1       PRESERVISION/LUTEIN Caps        Dose:  1 capsule   Take 1 capsule by mouth daily   Refills:  0       ranitidine 150 MG tablet   Commonly known as:  ZANTAC   Used for:  Dyspepsia        Dose:  150 mg   Take 1 tablet (150 mg) by mouth 2 times daily   Quantity:  60 tablet   Refills:  3       simvastatin 10 MG tablet   Commonly known as:  ZOCOR   Used for:  Type 2 diabetes mellitus with diabetic nephropathy, without long-term current use of insulin (H)        TAKE 1 TABLET (10 MG) BY MOUTH AT BEDTIME   Quantity:  90 tablet   Refills:  0       tiotropium 18 MCG capsule   Commonly known as:  SPIRIVA        Dose:  18 mcg   Inhale 18 mcg into the lungs every evening   Refills:  0                Protect others around you: Learn how to safely use, store and throw away your  medicines at www.disposemymeds.org.         Follow-ups after your visit        Your next 10 appointments already scheduled     Jan 23, 2018  1:15 PM CST   [place] with STONE TECH1   [facility] ([facility])    [address]  [place] [address] [phone]            Jan 25, 2018  1:00 PM CST   Office Visit with Neisha Esparza MD   [facility]    [address] [facility] [id number] [phone]           Bring a current list of meds and any records pertaining to this visit. For Physicals, please bring immunization records and any forms needing to be filled out. Please arrive 10 minutes early to complete paperwork.            Apr 17, 2018  1:00 PM CDT   Return Visit with  Oncology Nurse   [facility] ([facility])    [facility]  [address]  [facility] [address] [phone]            Apr 17, 2018  2:00 PM CDT   Return Visit with Estiven Cali MD   [facility] ([facility])    [facility]  [address]  [facility] [address] [phone]               Care Instructions        Further instructions from your care team       Peripheral Angiogram Discharge Instructions     After you go home:      Have an adult stay with you until tomorrow.    Drink extra fluids for 2 days.    You may resume your normal diet.    No smoking       For 24 hours - due to the sedation you received:    Relax and take it easy.    Do NOT make any important or legal decisions.    Do NOT drive or operate machines at home or at work.    Do NOT drink alcohol.    Care of Foot Puncture Site:      For the first 24 hrs - check the puncture site every 1-2 hours while awake.    Remove the bandaid after 24 hours. If there is minor oozing, apply another bandaid and remove it after 12 hours.    It is normal to have a  "small bruise or pea size lump at the site.    You may shower tomorrow.  Do NOT take a bath, or use a hot tub or pool for at least 3 days. Do NOT scrub the site. Do not use lotion or powder near the puncture site.     Activity:            For 2 days:    Do NOT do any heavy activity such as exercise, lifting, or straining.     No housework, yard work or any activity that make you sweat    Do NOT lift more than 10 pounds    Bleeding:      If you start bleeding from the site in your foot, lie down flat and press firmly on/above the site for 10 minutes.     Once bleeding stops, lay flat for 2 hours.     Call the [facility] as soon as you can.       Call 911 right away if you have heavy bleeding or bleeding that does not stop.      Medicines:      Take your medications, including blood thinners, unless your provider tells you not to.      If you have stopped any medicines, check with your provider about when to restart them.        Follow Up Appointments:      Follow up with [facility] as directed.    Call the clinic if:      You have increased pain or a large or growing hard lump around the site.    The site is red, swollen, hot or tender.    Blood or fluid is draining from the site.    You have chills or a fever greater than 101 F (38 C).    Your foot turns feels numb, cool or changes color.    You have hives, a rash or unusual itching.    Any questions or concerns.    Other Instructions:      If you received a stent - carry your stent card with you at all times.      If you have questions or your original symptoms do not improve, call:         [facility] @ [phone]         Additional Information About Your Visit        [employer] Information     [employer] lets you send messages to your doctor, view your test results, renew your prescriptions, schedule appointments and more. To sign up, go to [email or web address] . Click on \"Log in\" on the left side of the screen, which will take " "you to the Welcome page. Then click on \"Sign up Now\" on the right side of the page.     You will be asked to enter the access code listed below, as well as some personal information. Please follow the directions to create your username and password.     Your access code is: [email or web address]  Expires: 2018  5:15 PM     Your access code will  in 90 days. If you need help or a new code, please call your [facility] clinic or [phone].        Care EveryWhere ID     This is your Care EveryWhere ID. This could be used by other organizations to access your [facility] medical records  [id number]        Your Vitals Were     Blood Pressure Pulse Temperature Respirations Last Period Pulse Oximetry    147/78 70 97.9  F (36.6  C) (Oral) 16 (LMP Unknown) 96%       Primary Care Provider Office Phone # Fax #    Neisha MARIMAR Esparza [phone]      Equal Access to Services     [facility]: Hadii aad ku hadasho Sojovanyali, waaxda luqadaha, qaybta kaalmada adeegyada, waxay mariin carl mccullough . So [facility] [phone].    ATENCIÓN: Si habla español, tiene a gupta disposición servicios gratuitos de asistencia lingüística. Maxim al [phone].    We comply with applicable federal civil rights laws and [place] laws. We do not discriminate on the basis of race, color, national origin, age, disability, sex, sexual orientation, or gender identity.            Thank you!     Thank you for choosing [facility] for your care. Our goal is always to provide you with excellent care. Hearing back from our patients is one way we can continue to improve our services. Please take a few minutes to complete the written survey that you may receive in the mail after you visit with us. Thank you!             Medication List: This is a list of all your medications and when to take them. Check marks below indicate your daily home schedule. Keep this list as a reference.      Medications           Morning Afternoon Evening " Bedtime As Needed    albuterol 108 (90 BASE) MCG/ACT Inhaler   Commonly known as:  PROAIR HFA/PROVENTIL HFA/VENTOLIN HFA   Inhale 2 puffs into the lungs every 4 hours as needed for shortness of breath / dyspnea or wheezing                                apixaban ANTICOAGULANT 2.5 MG tablet   Commonly known as:  ELIQUIS   Take 1 tablet (2.5 mg) by mouth 2 times daily                                betamethasone valerate 0.1 % cream   Commonly known as:  VALISONE   Apply topically 2 times daily as needed (Use Sparingly)                                fluticasone 50 MCG/ACT spray   Commonly known as:  FLONASE   Spray 1-2 sprays into both nostrils every evening                                furosemide 20 MG tablet   Commonly known as:  LASIX   TAKE 1-2 TABS BY MOUTH ONCE DAILY. MAY REPEAT AFTER NOON IF NEEDED FOR WEIGHT GAIN/2+ EDEMA                                GABAPENTIN PO   Take 300 mg by mouth 2 times daily                                GLUCOTROL PO   Take 5 mg by mouth every morning (before breakfast)                                HYDROcodone-acetaminophen 5-325 MG per tablet   Commonly known as:  NORCO   Take 1 tablet by mouth every 6 hours as needed for moderate to severe pain                                hydrocortisone 2.5 % cream   Commonly known as:  ANUSOL-HC   Place rectally 2 times daily as needed for hemorrhoids                                hydrOXYzine 25 MG capsule   Commonly known as:  VISTARIL   Take 1 capsule (25 mg) by mouth 2 times daily as needed for itching                                lisinopril 10 MG tablet   Commonly known as:  PRINIVIL/ZESTRIL   Take 1 tablet (10 mg) by mouth daily                                magnesium hydroxide 400 MG/5ML suspension   Commonly known as:  MILK OF MAGNESIA   Take 30 mLs by mouth At Bedtime                                nystatin cream   Commonly known as:  MYCOSTATIN   Apply topically 2 times daily as needed for dry skin                                 ondansetron 4 MG ODT tab   Commonly known as:  ZOFRAN ODT   Take 1 tablet (4 mg) by mouth every 6 hours as needed for nausea                                PRESERVISION/LUTEIN Caps   Take 1 capsule by mouth daily                                ranitidine 150 MG tablet   Commonly known as:  ZANTAC   Take 1 tablet (150 mg) by mouth 2 times daily                                simvastatin 10 MG tablet   Commonly known as:  ZOCOR   TAKE 1 TABLET (10 MG) BY MOUTH AT BEDTIME                                tiotropium 18 MCG capsule   Commonly known as:  SPIRIVA   Inhale 18 mcg into the lungs every evening

## 2018-01-18 NOTE — CONSULTS
VASCULAR MEDICINE CHART CHECK    Patient is an 82 year old female presenting today for a lower extremity angiogram. Her lipids are well controlled on simvastatin and diabetes is well controlled on Glipizide. She has been cutting back, but continues to smoke a few cigarettes per day. Smoking cessation was again advised. She knows she needs to quit and already has patches at home. She is on Eliquis for atrial fibrillation. This can be resumed tomorrow. No formal Vascular Medicine is needed at this time. Please call if we can be of any further assistance this admission or in the future ([phone]).    Tina Beaver PA-C         Physician Supervisory Attestation:   I have reviewed and discussed with the physician assistant their history, physical and plan and independently interviewed and examined Helene Gibbs and agree with the plan as stated in the physician assistant note.    Riley Montes MD,[facility]  Vascular Medicine   1/18/2018

## 2018-01-18 NOTE — PROGRESS NOTES
Patient is here for repeat peripheral angio. A & O times four. Vitals stable. Radial and DP Pulses are palpable. PT pulse: positive for dopplers. Consent need to be signed. Independent with cares.  IVF started. BGM is 84. She ate 0800. Patient is concerned about her Blood sugar. She stated her blood sugar was >140 this morning before she ate. Please monitor her blood sugar.

## 2018-01-18 NOTE — PROGRESS NOTES
Interventional Radiology Intra-procedural Nursing Note    Patient Name: Helene Gibbs  Medical Record Number: [id number]  Today's Date: January 18, 2018    Start Time: 1421  End of procedure time: 1548  Procedure: Left Tibial Angiogram and angioplasty  Report given to: [facility]  Time pt departs:  1605      Other Notes: Patient to IR1 for left leg angiogram with angioplasty. Fentanyl and midazolam for sedation. 4F sheath inserted into left DP artery at 1455. 4,000 units of heparin given intra-procedure, 20mg protamine. Sheath out at 1548, site WNL, C/D/I.    Qiana Diaz RN

## 2018-01-19 NOTE — IR NOTE
Interventional Radiology Brief Post Procedure Note    Pre Procedure Diagnosis: Left lower extremity critical limb ischemia    Post Procedure Diagnosis: Same    Procedure: Balloon angioplasty of the left anterior tibial artery via retrograde left dorsalis pedis artery access    Proceduralist: Pieter Watson MD    Assistant: Aysha Mejia MD (Alex)    Time Out: Prior to the start of the procedure and with procedural staff participation, I verbally confirmed the patient s identity using two indicators, relevant allergies, that the procedure was appropriate and matched the consent or emergent situation, and that the correct equipment/implants were available. Immediately prior to starting the procedure I conducted the Time Out with the procedural staff and re-confirmed the patient s name, procedure, and site/side. (The Joint Commission universal protocol was followed.)  Yes    Sedation: Monitored Anesthesia Care (MAC) administered and documented by Anesthesia Care Provider Sedation time 87 minutes    Findings: Patent left anterior tibial artery to the foot at the end of the case    Estimated Blood Loss: Less than 10 ml    Fluoroscopy Time: 12.4 minutes     Fluoroscopy dose: 159 mGy    Contrast: 25 mL Visi    SPECIMENS: None    Complications: None    Condition: Stable    Plan: Discharge home after 3 hours of bed rest    Comments: See dictated procedure note for full details     Aysha Mejia MD

## 2018-01-22 ENCOUNTER — TELEPHONE (OUTPATIENT)
Dept: OTHER | Facility: CLINIC | Age: 83
End: 2018-01-22

## 2018-01-22 NOTE — TELEPHONE ENCOUNTER
Called and left patient a message to call clinic and schedule 2 week follow up visit with Dr. Watson on February 2nd.    Michelle Valdes CMA

## 2018-01-23 ENCOUNTER — ALLIED HEALTH/NURSE VISIT (OUTPATIENT)
Dept: CARDIOLOGY | Facility: CLINIC | Age: 83
End: 2018-01-23
Payer: MEDICARE

## 2018-01-23 DIAGNOSIS — Z95.0 CARDIAC PACEMAKER IN SITU: Primary | ICD-10-CM

## 2018-01-23 PROCEDURE — 93293 PM PHONE R-STRIP DEVICE EVAL: CPT | Performed by: INTERNAL MEDICINE

## 2018-01-23 NOTE — MR AVS SNAPSHOT
After Visit Summary   1/23/2018    Helene Gibbs    MRN: [id number]           Patient Information     Date Of Birth          1935        Visit Information        Provider Department      1/23/2018 1:15 PM CHASE KILGORE [facility]        Today's Diagnoses     Cardiac pacemaker in situ    -  1       Follow-ups after your visit        Your next 10 appointments already scheduled     Jan 25, 2018  1:00 PM CST   Office Visit with Neisha Esparza MD   [facility] ([facility]    [address] [facility] [address] [phone]           Bring a current list of meds and any records pertaining to this visit. For Physicals, please bring immunization records and any forms needing to be filled out. Please arrive 10 minutes early to complete paperwork.            Feb 02, 2018 11:15 AM CST   Return Visit with Pieter Watson MD   [facility] ([facility]    [address]  [facility] [address]   [phone]            Apr 17, 2018  1:00 PM CDT   Return Visit with  Oncology Nurse   [facility]    [facility]  [address]  [facility] [address] [phone]            Apr 17, 2018  2:00 PM CDT   Return Visit with Estiven Cali MD   [facility]    [facility]  [address]  [facility] [address] [phone]            May 01, 2018  1:15 PM CDT   Teletrace with CHASE KILGORE   [facility] ([facility])    [address]  [facility] [address] [phone]              Who to contact     If you have questions or need follow up information about today's clinic visit or your schedule please contact [facility]  "directly at [phone].  Normal or non-critical lab and imaging results will be communicated to you by MyChart, letter or phone within 4 business days after the clinic has received the results. If you do not hear from us within 7 days, please contact the clinic through [employer] or phone. If you have a critical or abnormal lab result, we will notify you by phone as soon as possible.  Submit refill requests through [employer] or call your pharmacy and they will forward the refill request to us. Please allow 3 business days for your refill to be completed.          Additional Information About Your Visit        [employer]adcastharPeeplePass Information     [employer] lets you send messages to your doctor, view your test results, renew your prescriptions, schedule appointments and more. To sign up, go to [email or web address] . Click on \"Log in\" on the left side of the screen, which will take you to the Welcome page. Then click on \"Sign up Now\" on the right side of the page.     You will be asked to enter the access code listed below, as well as some personal information. Please follow the directions to create your username and password.     Your access code is: [email or web address]  Expires: 2018  5:15 PM     Your access code will  in 90 days. If you need help or a new code, please call your [facility] clinic or [phone].        Care EveryWhere ID     This is your Care EveryWhere ID. This could be used by other organizations to access your [facility] medical records  [id number]        Your Vitals Were     Last Period                   (LMP Unknown)            Blood Pressure from Last 3 Encounters:   18 138/66   18 154/87   18 153/78    Weight from Last 3 Encounters:   18 76.9 kg (169 lb 9.6 oz)   18 72.5 kg (159 lb 13.3 oz)   17 73 kg (161 lb)              We Performed the Following     PM PHONE R STRIP EVAL UP TO 90 DAYS ([phone])        Primary Care Provider Office Phone # Fax #    Neisha Esparza MD " [phone]       [address]  [place]                [place] [address]        Goals        General    start date: 4/17/14 I will weigh myself daily and if any weight gain or shortness of breath I will call the clinic. (pt-stated)     Notes - Note created  4/17/2014  3:59 PM by Margareth Pichardo, RN    As of today's date 4/17/2014 goal is met at 0 - 25%.   Goal Status:  Active        Equal Access to Services     TONG TUCKER : Hadii aad ku hadasho Soomaali, waaxda luqadaha, qaybta kaalmada adeegyada, waxay idiin hayaan adeeg kharash lafili . So [facility] [place] [phone].    ATENCIÓN: Si habla español, tiene a gupta disposición servicios gratuitos de asistencia lingüística. Llame al [phone].    We comply with applicable federal civil rights laws and [place] laws. We do not discriminate on the basis of race, color, national origin, age, disability, sex, sexual orientation, or gender identity.            Thank you!     Thank you for choosing [facility]  for your care. Our goal is always to provide you with excellent care. Hearing back from our patients is one way we can continue to improve our services. Please take a few minutes to complete the written survey that you may receive in the mail after your visit with us. Thank you!             Your Updated Medication List - Protect others around you: Learn how to safely use, store and throw away your medicines at www.disposemymeds.org.          This list is accurate as of: 1/23/18  1:29 PM.  Always use your most recent med list.                   Brand Name Dispense Instructions for use Diagnosis    albuterol 108 (90 BASE) MCG/ACT Inhaler    PROAIR HFA/PROVENTIL HFA/VENTOLIN HFA    1 Inhaler    Inhale 2 puffs into the lungs every 4 hours as needed for shortness of breath / dyspnea or wheezing    COPD (chronic obstructive pulmonary disease) (H)       apixaban ANTICOAGULANT 2.5 MG tablet    ELIQUIS    60 tablet    Take  1 tablet (2.5 mg) by mouth 2 times daily    Atrial fibrillation, unspecified type (H)       betamethasone valerate 0.1 % cream    VALISONE     Apply topically 2 times daily as needed (Use Sparingly)        fluticasone 50 MCG/ACT spray    FLONASE     Spray 1-2 sprays into both nostrils every evening        furosemide 20 MG tablet    LASIX    180 tablet    TAKE 1-2 TABS BY MOUTH ONCE DAILY. MAY REPEAT AFTER NOON IF NEEDED FOR WEIGHT GAIN/2+ EDEMA    Swelling       GABAPENTIN PO      Take 300 mg by mouth 2 times daily        GLUCOTROL PO      Take 5 mg by mouth every morning (before breakfast)        HYDROcodone-acetaminophen 5-325 MG per tablet    NORCO    90 tablet    Take 1 tablet by mouth every 6 hours as needed for moderate to severe pain    Intractable back pain, Scoliosis of lumbar spine, unspecified scoliosis type, Chronic midline low back pain with sciatica, sciatica laterality unspecified       hydrocortisone 2.5 % cream    ANUSOL-HC    28 g    Place rectally 2 times daily as needed for hemorrhoids    External hemorrhoids       hydrOXYzine 25 MG capsule    VISTARIL    180 capsule    Take 1 capsule (25 mg) by mouth 2 times daily as needed for itching    Itching       lisinopril 10 MG tablet    PRINIVIL/ZESTRIL    90 tablet    Take 1 tablet (10 mg) by mouth daily    Essential hypertension       magnesium hydroxide 400 MG/5ML suspension    MILK OF MAGNESIA     Take 30 mLs by mouth At Bedtime        nystatin cream    MYCOSTATIN     Apply topically 2 times daily as needed for dry skin        ondansetron 4 MG ODT tab    ZOFRAN ODT    30 tablet    Take 1 tablet (4 mg) by mouth every 6 hours as needed for nausea    Nausea       PRESERVISION/LUTEIN Caps      Take 1 capsule by mouth daily        ranitidine 150 MG tablet    ZANTAC    60 tablet    Take 1 tablet (150 mg) by mouth 2 times daily    Dyspepsia       simvastatin 10 MG tablet    ZOCOR    90 tablet    TAKE 1 TABLET (10 MG) BY MOUTH AT BEDTIME    Type 2 diabetes  mellitus with diabetic nephropathy, without long-term current use of insulin (H)       tiotropium 18 MCG capsule    SPIRIVA     Inhale 18 mcg into the lungs every evening

## 2018-01-23 NOTE — PROGRESS NOTES
~90 day office teletrace  Normal pacemaker function.  at time of check. Thirty second strips for presenting, magnet, and post magnet modes.  Six second strips were saved in each mode.  Normal function post magnet. F/u in 3 months. JOSEPH Brooks

## 2018-01-25 ENCOUNTER — OFFICE VISIT (OUTPATIENT)
Dept: FAMILY MEDICINE | Facility: CLINIC | Age: 83
End: 2018-01-25
Payer: MEDICARE

## 2018-01-25 VITALS
DIASTOLIC BLOOD PRESSURE: 81 MMHG | HEART RATE: 72 BPM | TEMPERATURE: 98.1 F | BODY MASS INDEX: 27.28 KG/M2 | SYSTOLIC BLOOD PRESSURE: 147 MMHG | OXYGEN SATURATION: 99 % | WEIGHT: 169 LBS

## 2018-01-25 DIAGNOSIS — G89.29 CHRONIC MIDLINE LOW BACK PAIN WITH SCIATICA, SCIATICA LATERALITY UNSPECIFIED: ICD-10-CM

## 2018-01-25 DIAGNOSIS — M41.9 SCOLIOSIS OF LUMBAR SPINE, UNSPECIFIED SCOLIOSIS TYPE: ICD-10-CM

## 2018-01-25 DIAGNOSIS — R10.2 VAGINAL PAIN: ICD-10-CM

## 2018-01-25 DIAGNOSIS — D64.9 ANEMIA, UNSPECIFIED TYPE: ICD-10-CM

## 2018-01-25 DIAGNOSIS — D47.2 MONOCLONAL GAMMOPATHY: ICD-10-CM

## 2018-01-25 DIAGNOSIS — E11.21 TYPE 2 DIABETES MELLITUS WITH DIABETIC NEPHROPATHY, WITHOUT LONG-TERM CURRENT USE OF INSULIN (H): Primary | ICD-10-CM

## 2018-01-25 DIAGNOSIS — M54.40 CHRONIC MIDLINE LOW BACK PAIN WITH SCIATICA, SCIATICA LATERALITY UNSPECIFIED: ICD-10-CM

## 2018-01-25 DIAGNOSIS — M54.9 INTRACTABLE BACK PAIN: ICD-10-CM

## 2018-01-25 LAB
ERYTHROCYTE [DISTWIDTH] IN BLOOD BY AUTOMATED COUNT: 15.4 % (ref 10–15)
HCT VFR BLD AUTO: 35.5 % (ref 35–47)
HGB BLD-MCNC: 11.2 G/DL (ref 11.7–15.7)
MCH RBC QN AUTO: 30.9 PG (ref 26.5–33)
MCHC RBC AUTO-ENTMCNC: 31.5 G/DL (ref 31.5–36.5)
MCV RBC AUTO: 98 FL (ref 78–100)
PLATELET # BLD AUTO: 277 10E9/L (ref 150–450)
RBC # BLD AUTO: 3.62 10E12/L (ref 3.8–5.2)
WBC # BLD AUTO: 6.2 10E9/L (ref 4–11)

## 2018-01-25 PROCEDURE — 82043 UR ALBUMIN QUANTITATIVE: CPT | Performed by: INTERNAL MEDICINE

## 2018-01-25 PROCEDURE — 36415 COLL VENOUS BLD VENIPUNCTURE: CPT | Performed by: INTERNAL MEDICINE

## 2018-01-25 PROCEDURE — 99214 OFFICE O/P EST MOD 30 MIN: CPT | Performed by: INTERNAL MEDICINE

## 2018-01-25 PROCEDURE — 85027 COMPLETE CBC AUTOMATED: CPT | Performed by: INTERNAL MEDICINE

## 2018-01-25 RX ORDER — DIAZEPAM 2.5 MG/.5ML
GEL RECTAL
Qty: 40 SUPPOSITORY | Refills: 1 | Status: SHIPPED | OUTPATIENT
Start: 2018-01-25 | End: 2018-03-07

## 2018-01-25 RX ORDER — HYDROCODONE BITARTRATE AND ACETAMINOPHEN 5; 325 MG/1; MG/1
1 TABLET ORAL EVERY 6 HOURS PRN
Qty: 90 TABLET | Refills: 0 | Status: SHIPPED | OUTPATIENT
Start: 2018-01-25 | End: 2018-03-22

## 2018-01-25 NOTE — MR AVS SNAPSHOT
After Visit Summary   1/25/2018    Helene Gibbs    MRN: [id number]           Patient Information     Date Of Birth          1935        Visit Information        Provider Department      1/25/2018 1:00 PM Neisha Esparza MD [facility]        Today's Diagnoses     Type 2 diabetes mellitus with diabetic nephropathy, without long-term current use of insulin (H)    -  1    Anemia, unspecified type        Monoclonal gammopathy        Vaginal pain        Intractable back pain        Scoliosis of lumbar spine, unspecified scoliosis type        Chronic midline low back pain with sciatica, sciatica laterality unspecified          Care Instructions    Labs today  I refilled your prescriptions  Use Spiriva daily as it will help keep your lung passages open  Norco medication is a controlled substance. It can be habit-forming. It should be taken as prescribed. Do not mix it with alcohol. Be careful with driving. Do not lose the prescription. Do not overuse this medication.  Follow up in 3-4 months  Seek sooner medical attention if there is any worsening of symptoms or problems.            Follow-ups after your visit        Follow-up notes from your care team     Return in about 4 months (around 5/25/2018).      Your next 10 appointments already scheduled     Feb 02, 2018 11:15 AM CST   Return Visit with Pieter Watson MD   [facility] ([facility])    [address] [place] [address]. [address]  [facility] [address] [phone]            Mar 01, 2018  1:45 PM CST   Ech Complete with [id number]   [facility] ([facility])    [address] [facility]  [address] [place] [address] [phone]           1. Please bring or wear a comfortable two-piece outfit. 2. You may eat, drink and take your normal medicines. 3. For any questions that cannot be answered, please  "contact the ordering physician            Mar 07, 2018  1:30 PM CST   [facility] EP RETURN with Angelica Grady APRN CNP   [facility] ([facility])    [address] [facility] [address]  [facility] [address] [phone]            Apr 17, 2018  1:00 PM CDT   Return Visit with Sh Oncology Nurse   [facility] ([facility])    [facility]  [address]  [facility] [address] [phone]            Apr 17, 2018  2:00 PM CDT   Return Visit with Estiven Cali MD   [facility] ([facility])    [facility]  [address]  [facility] [address]   [phone]            May 01, 2018  1:15 PM CDT   Teletrace with STONE TECH1   [facility] ([facility])    [address] [facility] [address]  [facility] [address] [phone]              Who to contact     If you have questions or need follow up information about today's clinic visit or your schedule please contact [facility] directly at [phone].  Normal or non-critical lab and imaging results will be communicated to you by MyChart, letter or phone within 4 business days after the clinic has received the results. If you do not hear from us within 7 days, please contact the clinic through MyChart or phone. If you have a critical or abnormal lab result, we will notify you by phone as soon as possible.  Submit refill requests through [employer] or call your pharmacy and they will forward the refill request to us. Please allow 3 business days for your refill to be completed.          Additional Information About Your Visit        [employer] Information     [employer] lets you send messages to your doctor, view your test results, renew your prescriptions, schedule appointments and more. To sign up, go to [email or web address] . Click on \"Log in\" on the left side of the " "screen, which will take you to the Welcome page. Then click on \"Sign up Now\" on the right side of the page.     You will be asked to enter the access code listed below, as well as some personal information. Please follow the directions to create your username and password.     Your access code is: [email or web address]  Expires: 2018  5:15 PM     Your access code will  in 90 days. If you need help or a new code, please call your [facility] or [phone].        Care EveryWhere ID     This is your Care EveryWhere ID. This could be used by other organizations to access your [place] medical records  [id number]        Your Vitals Were     Pulse Temperature Last Period Pulse Oximetry Breastfeeding? BMI (Body Mass Index)    72 98.1  F (36.7  C) (LMP Unknown) 99% No 27.28 kg/m2       Blood Pressure from Last 3 Encounters:   18 147/81   18 138/66   18 154/87    Weight from Last 3 Encounters:   18 169 lb (76.7 kg)   18 169 lb 9.6 oz (76.9 kg)   18 159 lb 13.3 oz (72.5 kg)              We Performed the Following     Albumin Random Urine Quantitative with Creat Ratio     CBC with platelets          Today's Medication Changes          These changes are accurate as of 18  8:11 PM.  If you have any questions, ask your nurse or doctor.               Start taking these medicines.        Dose/Directions    diazepam 10 mg Supp   Used for:  Vaginal pain   Started by:  Neisha Esparza MD        Insert one suppository vaginally every night at bed time as needed   Quantity:  40 suppository   Refills:  1            Where to get your medicines      Some of these will need a paper prescription and others can be bought over the counter.  Ask your nurse if you have questions.     Bring a paper prescription for each of these medications     diazepam 10 mg Supp    HYDROcodone-acetaminophen 5-325 MG per tablet                Primary Care Provider Office Phone # Fax #    Neisha Esparza MD " [phone]       [address]  [facility] [address]        Goals        General    start date: 4/17/14 I will weigh myself daily and if any weight gain or shortness of breath I will call the clinic. (pt-stated)     Notes - Note created  4/17/2014  3:59 PM by Margareth Pichardo, RN    As of today's date 4/17/2014 goal is met at 0 - 25%.   Goal Status:  Active        Equal Access to Services     TONG TUCKER : Hadii aad ku hadasho Soomaali, waaxda luqadaha, qaybta kaalmada adeegyada, waxay idiin hayaan adeeg kharadc mccullough . So [facility] [phone].    ATENCIÓN: Si habla español, tiene a gupta disposición servicios gratuitos de asistencia lingüística. Llame al [phone].    We comply with applicable federal civil rights laws and [place] laws. We do not discriminate on the basis of race, color, national origin, age, disability, sex, sexual orientation, or gender identity.            Thank you!     Thank you for choosing [facility]  for your care. Our goal is always to provide you with excellent care. Hearing back from our patients is one way we can continue to improve our services. Please take a few minutes to complete the written survey that you may receive in the mail after your visit with us. Thank you!             Your Updated Medication List - Protect others around you: Learn how to safely use, store and throw away your medicines at www.disposemymeds.org.          This list is accurate as of 1/25/18  8:11 PM.  Always use your most recent med list.                   Brand Name Dispense Instructions for use Diagnosis    albuterol 108 (90 BASE) MCG/ACT Inhaler    PROAIR HFA/PROVENTIL HFA/VENTOLIN HFA    1 Inhaler    Inhale 2 puffs into the lungs every 4 hours as needed for shortness of breath / dyspnea or wheezing    COPD (chronic obstructive pulmonary disease) (H)       apixaban ANTICOAGULANT 2.5 MG tablet    ELIQUIS    60 tablet    Take 1 tablet (2.5 mg) by mouth 2  times daily    Atrial fibrillation, unspecified type (H)       betamethasone valerate 0.1 % cream    VALISONE     Apply topically 2 times daily as needed (Use Sparingly)        diazepam 10 mg Supp     40 suppository    Insert one suppository vaginally every night at bed time as needed    Vaginal pain       fluticasone 50 MCG/ACT spray    FLONASE     Spray 1-2 sprays into both nostrils every evening        furosemide 20 MG tablet    LASIX    180 tablet    TAKE 1-2 TABS BY MOUTH ONCE DAILY. MAY REPEAT AFTER NOON IF NEEDED FOR WEIGHT GAIN/2+ EDEMA    Swelling       GABAPENTIN PO      Take 300 mg by mouth 2 times daily        GLUCOTROL PO      Take 5 mg by mouth every morning (before breakfast)        HYDROcodone-acetaminophen 5-325 MG per tablet    NORCO    90 tablet    Take 1 tablet by mouth every 6 hours as needed for moderate to severe pain    Intractable back pain, Scoliosis of lumbar spine, unspecified scoliosis type, Chronic midline low back pain with sciatica, sciatica laterality unspecified       hydrocortisone 2.5 % cream    ANUSOL-HC    28 g    Place rectally 2 times daily as needed for hemorrhoids    External hemorrhoids       hydrOXYzine 25 MG capsule    VISTARIL    180 capsule    Take 1 capsule (25 mg) by mouth 2 times daily as needed for itching    Itching       lisinopril 10 MG tablet    PRINIVIL/ZESTRIL    90 tablet    Take 1 tablet (10 mg) by mouth daily    Essential hypertension       magnesium hydroxide 400 MG/5ML suspension    MILK OF MAGNESIA     Take 30 mLs by mouth At Bedtime        nystatin cream    MYCOSTATIN     Apply topically 2 times daily as needed for dry skin        ondansetron 4 MG ODT tab    ZOFRAN ODT    30 tablet    Take 1 tablet (4 mg) by mouth every 6 hours as needed for nausea    Nausea       PRESERVISION/LUTEIN Caps      Take 1 capsule by mouth daily        ranitidine 150 MG tablet    ZANTAC    60 tablet    Take 1 tablet (150 mg) by mouth 2 times daily    Dyspepsia        simvastatin 10 MG tablet    ZOCOR    90 tablet    TAKE 1 TABLET (10 MG) BY MOUTH AT BEDTIME    Type 2 diabetes mellitus with diabetic nephropathy, without long-term current use of insulin (H)       tiotropium 18 MCG capsule    SPIRIVA     Inhale 18 mcg into the lungs every evening

## 2018-01-25 NOTE — NURSING NOTE
"Chief Complaint   Patient presents with     RECHECK       Initial /81 (BP Location: Right arm, Patient Position: Sitting, Cuff Size: Adult Regular)  Pulse 72  Temp 98.1  F (36.7  C)  Wt 169 lb (76.7 kg)  LMP  (LMP Unknown)  SpO2 99%  Breastfeeding? No  BMI 27.28 kg/m2 Estimated body mass index is 27.28 kg/(m^2) as calculated from the following:    Height as of 1/4/18: 5' 6\" (1.676 m).    Weight as of this encounter: 169 lb (76.7 kg).  Medication Reconciliation: complete   Latesha [place]- CMA      "

## 2018-01-25 NOTE — PATIENT INSTRUCTIONS
Labs today  I refilled your prescriptions  Use Spiriva daily as it will help keep your lung passages open  Norco medication is a controlled substance. It can be habit-forming. It should be taken as prescribed. Do not mix it with alcohol. Be careful with driving. Do not lose the prescription. Do not overuse this medication.  Follow up in 3-4 months  Seek sooner medical attention if there is any worsening of symptoms or problems.

## 2018-01-25 NOTE — LETTER
[facility]  [address] [facility]  [address]  [place]  [address]  Tel: [phone]    January 26, 2018    Helene BURGESS Sai  [address]   [facility] [address]        Dear Ms. Gibbs,    This is to inform you regarding your test result.    Urine for microalbumin is positive.  You are on lisinopril which should help this   Hemoglobin has improved to 11.2.      Sincerely,    Neisha Esparza MD/ASHLEY          Enclosure: Lab Results  Results for orders placed or performed in visit on 01/25/18   Albumin Random Urine Quantitative with Creat Ratio   Result Value Ref Range    Creatinine Urine 39 mg/dL    Albumin Urine mg/L 52 mg/L    Albumin Urine mg/g Cr 135.40 (H) 0 - 25 mg/g Cr   CBC with platelets   Result Value Ref Range    WBC 6.2 4.0 - 11.0 10e9/L    RBC Count 3.62 (L) 3.8 - 5.2 10e12/L    Hemoglobin 11.2 (L) 11.7 - 15.7 g/dL    Hematocrit 35.5 35.0 - 47.0 %    MCV 98 78 - 100 fl    MCH 30.9 26.5 - 33.0 pg    MCHC 31.5 31.5 - 36.5 g/dL    RDW 15.4 (H) 10.0 - 15.0 %    Platelet Count 277 150 - 450 10e9/L     *Note: Due to a large number of results and/or encounters for the requested time period, some results have not been displayed. A complete set of results can be found in Results Review.

## 2018-01-25 NOTE — PROGRESS NOTES
SUBJECTIVE:   Helene Gibbs is a 82 year old female who presents to clinic today for the following health issues:    Patient presents to clinic to follow up on her shingles symptoms she states they are resolving and to also discuss her current state of her back pain. Patient states since the shingles have resolved it has helped improve her back pain, she states her back pain has slightly improved since last visit but is still present.     Her shingles started in the end of 09/17 and went away in the first week of 12/17    Claudication in Left Foot  Reports started as toe pain in left foot  She went to podiatry  Had XR done, and it came back normal  She went in to see Dr. Watson, from vascular surgery  She had angiography done**  Reports that Dr. Watson   She was admitted for surgery on 01/04/18 and discharged on 01/18/18  Reports she bled out excessively during surgery  Patient is back on Eliquis    Problem list and histories reviewed & adjusted, as indicated.  Additional history: as documented    Medications and Labs reviewed in EPIC    Reviewed and updated as needed this visit by clinical staff  Tobacco  Allergies  Meds  Soc Hx      Reviewed and updated as needed this visit by Provider         ROS:  Constitutional, HEENT, cardiovascular, pulmonary, GI, , musculoskeletal, neuro, skin, endocrine and psych systems are negative, except as otherwise noted.    This document serves as a record of the services and decisions personally performed and made by Neisha Esparza MD. It was created on her behalf by Cassandra Holcomb, a trained medical scribe. The creation of this document is based on the provider's statements to the medical scribe.  Cassandra Holcomb 1:16 PM January 25, 2018    OBJECTIVE:     /81 (BP Location: Right arm, Patient Position: Sitting, Cuff Size: Adult Regular)  Pulse 72  Temp 98.1  F (36.7  C)  Wt 76.7 kg (169 lb)  LMP  (LMP Unknown)  SpO2 99%  Breastfeeding? No  BMI 27.28  kg/m2  Body mass index is 27.28 kg/(m^2).  GENERAL: healthy, alert and no distress  PSYCH: mentation appears normal, affect normal/bright    Diagnostic Test Results:  No results found. However, due to the size of the patient record, not all encounters were searched. Please check Results Review for a complete set of results.    Reviewed and discussed labs done on 01/09/18  ASSESSMENT/PLAN:     Helene was seen today for recheck.    Diagnoses and all orders for this visit:    Type 2 diabetes mellitus with diabetic nephropathy, without long-term current use of insulin (H)  -     Albumin Random Urine Quantitative with Creat Ratio  Doing well  Compliant with medication  Lab Results   Component Value Date    A1C 6.6 01/04/2018    A1C 6.7 08/10/2017    A1C 6.4 04/25/2017    A1C 6.2 09/27/2016    A1C 6.0 03/11/2016     Anemia, unspecified type  -     CBC with platelets  Patient  Upon examination, patient didn't appear pale  She asked to be called with results today  I will have my nurse call her with results  She had vascular surgery on 01/04/18 for claudication in left leg by Dr. Watson  Reports started as toe pain in left foot  She went to podiatry  Had XR done, and it came back normal  She was discharged on 01/18/18  Reports she bled out excessively during surgery  Patient is back on Eliquis  If her hemoglobin is low today, she will have to go to infusion center    Monoclonal gammopathy  She is asymptomatic and follows oncology see note of     Vaginal pain  -     diazepam 10 mg SUPP; Insert one suppository vaginally every night at bed time as needed  Doing well  Taking medication, as needed it was prescribed by pelvic center gets from compounded pharmacy.    Intractable back pain  -     HYDROcodone-acetaminophen (NORCO) 5-325 MG per tablet; Take 1 tablet by mouth every 6 hours as needed for moderate to severe pain  Patient has had long stanging pain  She has been on long-term norco for pain management  She is only  taking it as needed  Patient was educated that norco is a controlled substance. It can be habit-forming. It should be taken as prescribed. Do not mix it with alcohol. Be careful with driving. Do not lose the prescription. Do not overuse this medication.    Scoliosis of lumbar spine, unspecified scoliosis type  -     HYDROcodone-acetaminophen (NORCO) 5-325 MG per tablet; Take 1 tablet by mouth every 6 hours as needed for moderate to severe pain  See above    Chronic midline low back pain with sciatica, sciatica laterality unspecified  -     HYDROcodone-acetaminophen (NORCO) 5-325 MG per tablet; Take 1 tablet by mouth every 6 hours as needed for moderate to severe pain  See above    Other orders  -     Cancel: BETA BLOCKER NOT PRESCRIBED, INTENTIONAL,; Beta Blocker not prescribed intentionally due to CHOOSE REASON BEFORE SIGNING!!!:[id number]    Patient Instructions   Labs today  I refilled your prescriptions  Use Spiriva daily as it will help keep your lung passages open  Norco medication is a controlled substance. It can be habit-forming. It should be taken as prescribed. Do not mix it with alcohol. Be careful with driving. Do not lose the prescription. Do not overuse this medication.  Follow up in 3-4 months  Seek sooner medical attention if there is any worsening of symptoms or problems.    The information in this document, created by the medical scribe for me, accurately reflects the services I personally performed and the decisions made by me. I have reviewed and approved this document for accuracy prior to leaving the patient care area.  January 25, 2018 1:42 PM    Neisha Esparza MD  [facility]

## 2018-01-26 LAB
CREAT UR-MCNC: 39 MG/DL
MICROALBUMIN UR-MCNC: 52 MG/L
MICROALBUMIN/CREAT UR: 135.4 MG/G CR (ref 0–25)

## 2018-01-26 NOTE — PROGRESS NOTES
Please notify patient by sending following letter with copy of test results      Shakira Narayan,    This is to inform you regarding your test result.    Urine for microalbumin is positive.  You are on lisinopril which should help this   Hemoglobin has improved to 11.2.    Sincerely,      Dr.Nasima Vilma MD,FACP

## 2018-02-02 ENCOUNTER — HOSPITAL ENCOUNTER (OUTPATIENT)
Dept: ULTRASOUND IMAGING | Facility: CLINIC | Age: 83
Discharge: HOME | End: 2018-02-02
Attending: SURGERY | Admitting: SURGERY
Payer: MEDICARE

## 2018-02-02 ENCOUNTER — OFFICE VISIT (OUTPATIENT)
Dept: OTHER | Facility: CLINIC | Age: 83
End: 2018-02-02
Attending: SURGERY
Payer: MEDICARE

## 2018-02-02 VITALS — SYSTOLIC BLOOD PRESSURE: 139 MMHG | DIASTOLIC BLOOD PRESSURE: 71 MMHG | HEART RATE: 70 BPM

## 2018-02-02 DIAGNOSIS — I73.9 PAD (PERIPHERAL ARTERY DISEASE) (H): ICD-10-CM

## 2018-02-02 DIAGNOSIS — I70.229 CRITICAL ISCHEMIA OF LOWER EXTREMITY (H): Primary | ICD-10-CM

## 2018-02-02 PROCEDURE — G0463 HOSPITAL OUTPT CLINIC VISIT: HCPCS

## 2018-02-02 PROCEDURE — 99212 OFFICE O/P EST SF 10 MIN: CPT | Mod: ZP | Performed by: SURGERY

## 2018-02-02 PROCEDURE — 93922 UPR/L XTREMITY ART 2 LEVELS: CPT

## 2018-02-02 NOTE — PROGRESS NOTES
Mrs. Gibbs underwent left SFA recanalization and retrograde anterior tibial recanalization and angioplasty. Her ischemic foot pain has resolved. She has a biphasic left AT signal. We will get ABIs today.

## 2018-02-02 NOTE — NURSING NOTE
"Chief Complaint   Patient presents with     RECHECK     2 week follow up to 1/18/18 angiogram       Initial /71 (BP Location: Right arm, Patient Position: Chair, Cuff Size: Adult Large)  Pulse 70  LMP  (LMP Unknown)  Breastfeeding? No Estimated body mass index is 27.28 kg/(m^2) as calculated from the following:    Height as of 1/4/18: 5' 6\" (1.676 m).    Weight as of 1/25/18: 169 lb (76.7 kg).  Medication Reconciliation: complete     Ruba Vallecillo MA   "

## 2018-02-02 NOTE — LETTER
[facility] [address]  [place] [address]  Phone: [phone]  Fax: [phone]    2018    Re: Helene Gibbs, : 1935    Mrs. Gibbs underwent left SFA recanalization and retrograde anterior tibial recanalization and angioplasty. Her ischemic foot pain has resolved. She has a biphasic left AT signal. We will get ABIs today.     Pieter Watson MD

## 2018-02-02 NOTE — MR AVS SNAPSHOT
After Visit Summary   2/2/2018    Helene Gibbs    MRN: [id number]           Patient Information     Date Of Birth          1935        Visit Information        Provider Department      2/2/2018 11:15 AM Pieter Watson MD [facility]      Today's Diagnoses     Critical ischemia of lower extremity    -  1       Follow-ups after your visit        Your next 10 appointments already scheduled     Mar 01, 2018  1:45 PM CST   Ech Complete with [id number]   [facility]ch[facility] ([facility])    [address]  [facility] [address] [phone]           1. Please bring or wear a comfortable two-piece outfit. 2. You may eat, drink and take your normal medicines. 3. For any questions that cannot be answered, please contact the ordering physician            Mar 07, 2018  1:30 PM CST   [facility] EP RETURN with Angelica Grady APRN CNP   [facility] ([facility])    [address]  [place] [facility] [address]   [phone]            Apr 17, 2018  1:00 PM CDT   Return Visit with  Oncology Nurse   [facility] ([facility])    [facility]  [address]  [facility] [address]   [phone]            Apr 17, 2018  2:00 PM CDT   Return Visit with Estiven Cali MD   [facility] ([facility])    [facility]  [address]  [facility] [address]   [phone]            May 01, 2018  1:15 PM CDT   Teletrace with STONE TECH1   [facility] ([facility])    [address]  [place] [facility] [address] [phone]              Future tests that were ordered for you today     Open Future Orders        Priority Expected Expires Ordered    US EMIGDIO Doppler  "No Exercise Routine  2019            Who to contact     If you have questions or need follow up information about today's clinic visit or your schedule please contact [facility] [place] directly at [phone].  Normal or non-critical lab and imaging results will be communicated to you by MyChart, letter or phone within 4 business days after the clinic has received the results. If you do not hear from us within 7 days, please contact the clinic through MyChart or phone. If you have a critical or abnormal lab result, we will notify you by phone as soon as possible.  Submit refill requests through [employer] or call your pharmacy and they will forward the refill request to us. Please allow 3 business days for your refill to be completed.          Additional Information About Your Visit        [employer] Information     [employer] lets you send messages to your doctor, view your test results, renew your prescriptions, schedule appointments and more. To sign up, go to [email or web address] . Click on \"Log in\" on the left side of the screen, which will take you to the Welcome page. Then click on \"Sign up Now\" on the right side of the page.     You will be asked to enter the access code listed below, as well as some personal information. Please follow the directions to create your username and password.     Your access code is: [email or web address]  Expires: 2018  5:15 PM     Your access code will  in 90 days. If you need help or a new code, please call your [place] clinic or [phone].        Care EveryWhere ID     This is your Care EveryWhere ID. This could be used by other organizations to access your [place] medical records  [id number]        Your Vitals Were     Pulse Last Period Breastfeeding?             70 (LMP Unknown) No          Blood Pressure from Last 3 Encounters:   18 139/71   18 147/81   18 138/66    Weight from Last 3 Encounters:   18 169 lb (76.7 " kg)   01/16/18 169 lb 9.6 oz (76.9 kg)   01/05/18 159 lb 13.3 oz (72.5 kg)              Today, you had the following     No orders found for display       Primary Care Provider Office Phone # Fax #    Neisha MARIMAR Esparza [phone] [address]  [place]                [place] [address]        Goals        General    start date: 4/17/14 I will weigh myself daily and if any weight gain or shortness of breath I will call the clinic. (pt-stated)     Notes - Note created  4/17/2014  3:59 PM by Margareth Pichardo, RN    As of today's date 4/17/2014 goal is met at 0 - 25%.   Goal Status:  Active        Equal Access to Services     TONG TUCKER AH: Hadii sukhdeep Brady, waaxda luqadaha, qaybta kaalmada rachelyamaile, pamela mccullough . So [facility] [phone].    ATENCIÓN: Si habla español, tiene a gupta disposición servicios gratuitos de asistencia lingüística. Llame al [phone].    We comply with applicable federal civil rights laws and [place] laws. We do not discriminate on the basis of race, color, national origin, age, disability, sex, sexual orientation, or gender identity.            Thank you!     Thank you for choosing [facility] [place]  for your care. Our goal is always to provide you with excellent care. Hearing back from our patients is one way we can continue to improve our services. Please take a few minutes to complete the written survey that you may receive in the mail after your visit with us. Thank you!             Your Updated Medication List - Protect others around you: Learn how to safely use, store and throw away your medicines at www.disposemymeds.org.          This list is accurate as of 2/2/18 12:15 PM.  Always use your most recent med list.                   Brand Name Dispense Instructions for use Diagnosis    albuterol 108 (90 BASE) MCG/ACT Inhaler    PROAIR HFA/PROVENTIL HFA/VENTOLIN HFA    1 Inhaler    Inhale 2 puffs into the  lungs every 4 hours as needed for shortness of breath / dyspnea or wheezing    COPD (chronic obstructive pulmonary disease) (H)       apixaban ANTICOAGULANT 2.5 MG tablet    ELIQUIS    60 tablet    Take 1 tablet (2.5 mg) by mouth 2 times daily    Atrial fibrillation, unspecified type (H)       betamethasone valerate 0.1 % cream    VALISONE     Apply topically 2 times daily as needed (Use Sparingly)        diazepam 10 mg Supp     40 suppository    Insert one suppository vaginally every night at bed time as needed    Vaginal pain       fluticasone 50 MCG/ACT spray    FLONASE     Spray 1-2 sprays into both nostrils every evening        furosemide 20 MG tablet    LASIX    180 tablet    TAKE 1-2 TABS BY MOUTH ONCE DAILY. MAY REPEAT AFTER NOON IF NEEDED FOR WEIGHT GAIN/2+ EDEMA    Swelling       GABAPENTIN PO      Take 300 mg by mouth 2 times daily        GLUCOTROL PO      Take 5 mg by mouth every morning (before breakfast)        HYDROcodone-acetaminophen 5-325 MG per tablet    NORCO    90 tablet    Take 1 tablet by mouth every 6 hours as needed for moderate to severe pain    Intractable back pain, Scoliosis of lumbar spine, unspecified scoliosis type, Chronic midline low back pain with sciatica, sciatica laterality unspecified       hydrocortisone 2.5 % cream    ANUSOL-HC    28 g    Place rectally 2 times daily as needed for hemorrhoids    External hemorrhoids       hydrOXYzine 25 MG capsule    VISTARIL    180 capsule    Take 1 capsule (25 mg) by mouth 2 times daily as needed for itching    Itching       lisinopril 10 MG tablet    PRINIVIL/ZESTRIL    90 tablet    Take 1 tablet (10 mg) by mouth daily    Essential hypertension       magnesium hydroxide 400 MG/5ML suspension    MILK OF MAGNESIA     Take 30 mLs by mouth At Bedtime        nystatin cream    MYCOSTATIN     Apply topically 2 times daily as needed for dry skin        ondansetron 4 MG ODT tab    ZOFRAN ODT    30 tablet    Take 1 tablet (4 mg) by mouth every 6  hours as needed for nausea    Nausea       PRESERVISION/LUTEIN Caps      Take 1 capsule by mouth daily        ranitidine 150 MG tablet    ZANTAC    60 tablet    Take 1 tablet (150 mg) by mouth 2 times daily    Dyspepsia       simvastatin 10 MG tablet    ZOCOR    90 tablet    TAKE 1 TABLET (10 MG) BY MOUTH AT BEDTIME    Type 2 diabetes mellitus with diabetic nephropathy, without long-term current use of insulin (H)       tiotropium 18 MCG capsule    SPIRIVA     Inhale 18 mcg into the lungs every evening

## 2018-02-14 ENCOUNTER — TELEPHONE (OUTPATIENT)
Dept: FAMILY MEDICINE | Facility: CLINIC | Age: 83
End: 2018-02-14

## 2018-02-14 NOTE — TELEPHONE ENCOUNTER
Reason for Call:  Other prescription    Detailed comments: Patient states that her lower extremities, calves in particular are very swollen and wants to know if she can cut down on her gabapentin. She states [facility] told her the gabapentin can sometimes cause swelling.     Phone Number Patient can be reached at: Home number on file [phone] (home)    Best Time: anytime     Can we leave a detailed message on this number? YES    Call taken on 2/14/2018 at 10:02 AM by Elena Dudley

## 2018-02-14 NOTE — TELEPHONE ENCOUNTER
Spoke to patient   Decrease gabapentin to 300 mg at bed time.  She had work up for DVT   Wears compression stockings  Dr.Nasima Vilma MD

## 2018-02-22 NOTE — PROGRESS NOTES
Mrs. Gibbs is s/p left SFA/popliteal angioplasty and stenting. She had developed a right groin hematoma for which we kept her under overnight observation. Her hematoma is resolving and there is residual ecchymosis as would be expected. Her left little toe pain is better than before, the pain is not constant but she still has nocturnal pain. We will proceed with tibial approach left AT angioplasty.

## 2018-03-01 ENCOUNTER — HOSPITAL ENCOUNTER (OUTPATIENT)
Dept: CARDIOLOGY | Facility: CLINIC | Age: 83
Discharge: HOME | End: 2018-03-01
Attending: INTERNAL MEDICINE | Admitting: INTERNAL MEDICINE
Payer: MEDICARE

## 2018-03-01 ENCOUNTER — TRANSFERRED RECORDS (OUTPATIENT)
Dept: HEALTH INFORMATION MANAGEMENT | Facility: CLINIC | Age: 83
End: 2018-03-01

## 2018-03-01 DIAGNOSIS — I48.91 ATRIAL FIBRILLATION, UNSPECIFIED TYPE (H): ICD-10-CM

## 2018-03-01 PROCEDURE — 40000264 ECHO COMPLETE WITH LUMASON

## 2018-03-01 PROCEDURE — 25500064 ZZH RX 255 OP 636: Performed by: INTERNAL MEDICINE

## 2018-03-01 PROCEDURE — 93306 TTE W/DOPPLER COMPLETE: CPT | Mod: 26 | Performed by: INTERNAL MEDICINE

## 2018-03-01 RX ADMIN — SULFUR HEXAFLUORIDE 5 ML: KIT at 14:37

## 2018-03-02 ENCOUNTER — OFFICE VISIT (OUTPATIENT)
Dept: OTHER | Facility: CLINIC | Age: 83
End: 2018-03-02
Attending: SURGERY
Payer: MEDICARE

## 2018-03-02 VITALS — SYSTOLIC BLOOD PRESSURE: 151 MMHG | DIASTOLIC BLOOD PRESSURE: 80 MMHG | HEART RATE: 90 BPM

## 2018-03-02 DIAGNOSIS — I70.229 CRITICAL ISCHEMIA OF LOWER EXTREMITY (H): Primary | ICD-10-CM

## 2018-03-02 PROCEDURE — G0463 HOSPITAL OUTPT CLINIC VISIT: HCPCS

## 2018-03-02 PROCEDURE — 99212 OFFICE O/P EST SF 10 MIN: CPT | Mod: ZP | Performed by: SURGERY

## 2018-03-02 NOTE — LETTER
[facility] [address]  [place] [address]  Phone: [phone]  Fax: [phone]      2018    Re: Helene Gibbs - 1935    Mrs. Rivas continues to do well.  She recently had some dead skin removed from the little toe by our podiatry colleagues. It is healing well.  I have reassured her and I will see her back in her next appointment.    LAWRENCE SNELL MD

## 2018-03-02 NOTE — MR AVS SNAPSHOT
After Visit Summary   3/2/2018    Helene Gibbs    MRN: [id number]           Patient Information     Date Of Birth          1935        Visit Information        Provider Department      3/2/2018 10:30 AM Pieter Watson MD [facility]      Today's Diagnoses     Critical ischemia of lower extremity    -  1       Follow-ups after your visit        Your next 10 appointments already scheduled     Mar 07, 2018  1:30 PM CST   [facility] EP RETURN with Angelica Grady, APRN CNP   [facility] ([facility])    [address]  [facility] [address]   [phone]            Apr 17, 2018  1:00 PM CDT   Return Visit with  Oncology Nurse   [facility] ([facility])    [facility]  [address]  [facility] [address]   [phone]            Apr 17, 2018  2:00 PM CDT   Return Visit with Estiven Cali MD   [facility] ([facility])    [facility]  [address]  [facility] [address]   [phone]            May 01, 2018  1:15 PM CDT   Teletrace with STONE TECH1   [facility] ([facility])    [address]  [facility] [address]   [phone]            May 04, 2018 11:00 AM CDT   US EMIGDIO DOPPLER NO EXERCISE 1-2 LVLS BILAT with [address] [place] [address] [facility] ([facility])    [address]  [facility] [address]   [phone]           Please bring a list of your medicines (including vitamins, minerals and over-the-counter drugs). Also, tell your doctor about any allergies you may have. Wear comfortable clothes and leave your valuables at home.  No caffeine or tobacco for 1 hour prior to exam.  Please call the Imaging Department at your exam site with  "any questions.            May 04, 2018 11:30 AM CDT   Return Visit with Pieter Watson MD   [facility] ([facility] [place])    [address]  [place] [address]   [phone]              Future tests that were ordered for you today     Open Future Orders        Priority Expected Expires Ordered    Echo Complete with Lumason Routine 2018 3/21/2018 2017            Who to contact     If you have questions or need follow up information about today's clinic visit or your schedule please contact [facility] [place] directly at [phone].  Normal or non-critical lab and imaging results will be communicated to you by Zentyalhart, letter or phone within 4 business days after the clinic has received the results. If you do not hear from us within 7 days, please contact the clinic through Zentyalhart or phone. If you have a critical or abnormal lab result, we will notify you by phone as soon as possible.  Submit refill requests through Bonfaire or call your pharmacy and they will forward the refill request to us. Please allow 3 business days for your refill to be completed.          Additional Information About Your Visit        Zentyalhart Information     Bonfaire lets you send messages to your doctor, view your test results, renew your prescriptions, schedule appointments and more. To sign up, go to [email or web address] . Click on \"Log in\" on the left side of the screen, which will take you to the Welcome page. Then click on \"Sign up Now\" on the right side of the page.     You will be asked to enter the access code listed below, as well as some personal information. Please follow the directions to create your username and password.     Your access code is: [email or web address]  Expires: 2018  5:15 PM     Your access code will  in 90 days. If you need help or a new code, please call your [place] clinic or [phone].   "      Care EveryWhere ID     This is your Care EveryWhere ID. This could be used by other organizations to access your [facility] medical records  [id number]        Your Vitals Were     Pulse Last Period Breastfeeding?             90 (LMP Unknown) No          Blood Pressure from Last 3 Encounters:   03/02/18 151/80   02/02/18 139/71   01/25/18 147/81    Weight from Last 3 Encounters:   01/25/18 169 lb (76.7 kg)   01/16/18 169 lb 9.6 oz (76.9 kg)   01/05/18 159 lb 13.3 oz (72.5 kg)              Today, you had the following     No orders found for display       Primary Care Provider Office Phone # Fax #    Neisha Esparza [phone] [address]  [place]                [place] [address]        Goals        General    start date: 4/17/14 I will weigh myself daily and if any weight gain or shortness of breath I will call the clinic. (pt-stated)     Notes - Note created  4/17/2014  3:59 PM by Margareth Pichardo, RN    As of today's date 4/17/2014 goal is met at 0 - 25%.   Goal Status:  Active        Equal Access to Services     TONG TUCKER AH: Hadii sukhdeep Brady, waaxda luqadaha, qaybta kaalmada adetoriyada, pamela nolasco. So [facility] [phone].    ATENCIÓN: Si habla español, tiene a gupta disposición servicios gratuitos de asistencia lingüística. Llame al [phone].    We comply with applicable federal civil rights laws and [place] laws. We do not discriminate on the basis of race, color, national origin, age, disability, sex, sexual orientation, or gender identity.            Thank you!     Thank you for choosing [facility] [place]  for your care. Our goal is always to provide you with excellent care. Hearing back from our patients is one way we can continue to improve our services. Please take a few minutes to complete the written survey that you may receive in the mail after your visit with us. Thank you!             Your Updated  Medication List - Protect others around you: Learn how to safely use, store and throw away your medicines at www.disposemymeds.org.          This list is accurate as of 3/2/18 12:43 PM.  Always use your most recent med list.                   Brand Name Dispense Instructions for use Diagnosis    albuterol 108 (90 BASE) MCG/ACT Inhaler    PROAIR HFA/PROVENTIL HFA/VENTOLIN HFA    1 Inhaler    Inhale 2 puffs into the lungs every 4 hours as needed for shortness of breath / dyspnea or wheezing    COPD (chronic obstructive pulmonary disease) (H)       apixaban ANTICOAGULANT 2.5 MG tablet    ELIQUIS    60 tablet    Take 1 tablet (2.5 mg) by mouth 2 times daily    Atrial fibrillation, unspecified type (H)       betamethasone valerate 0.1 % cream    VALISONE     Apply topically 2 times daily as needed (Use Sparingly)        diazepam 10 mg Supp     40 suppository    Insert one suppository vaginally every night at bed time as needed    Vaginal pain       fluticasone 50 MCG/ACT spray    FLONASE     Spray 1-2 sprays into both nostrils every evening        furosemide 20 MG tablet    LASIX    180 tablet    TAKE 1-2 TABS BY MOUTH ONCE DAILY. MAY REPEAT AFTER NOON IF NEEDED FOR WEIGHT GAIN/2+ EDEMA    Swelling       GABAPENTIN PO      Take 300 mg by mouth 2 times daily        GLUCOTROL PO      Take 5 mg by mouth every morning (before breakfast)        HYDROcodone-acetaminophen 5-325 MG per tablet    NORCO    90 tablet    Take 1 tablet by mouth every 6 hours as needed for moderate to severe pain    Intractable back pain, Scoliosis of lumbar spine, unspecified scoliosis type, Chronic midline low back pain with sciatica, sciatica laterality unspecified       hydrocortisone 2.5 % cream    ANUSOL-HC    28 g    Place rectally 2 times daily as needed for hemorrhoids    External hemorrhoids       hydrOXYzine 25 MG capsule    VISTARIL    180 capsule    Take 1 capsule (25 mg) by mouth 2 times daily as needed for itching    Itching        lisinopril 10 MG tablet    PRINIVIL/ZESTRIL    90 tablet    Take 1 tablet (10 mg) by mouth daily    Essential hypertension       magnesium hydroxide 400 MG/5ML suspension    MILK OF MAGNESIA     Take 30 mLs by mouth At Bedtime        nystatin cream    MYCOSTATIN     Apply topically 2 times daily as needed for dry skin        ondansetron 4 MG ODT tab    ZOFRAN ODT    30 tablet    Take 1 tablet (4 mg) by mouth every 6 hours as needed for nausea    Nausea       PRESERVISION/LUTEIN Caps      Take 1 capsule by mouth daily        ranitidine 150 MG tablet    ZANTAC    60 tablet    Take 1 tablet (150 mg) by mouth 2 times daily    Dyspepsia       simvastatin 10 MG tablet    ZOCOR    90 tablet    TAKE 1 TABLET (10 MG) BY MOUTH AT BEDTIME    Type 2 diabetes mellitus with diabetic nephropathy, without long-term current use of insulin (H)       tiotropium 18 MCG capsule    SPIRIVA     Inhale 18 mcg into the lungs every evening

## 2018-03-02 NOTE — PROGRESS NOTES
Mrs. Rivas continues to do well.  She recently had some dead skin removed from the little toe by our podiatry colleagues. It is healing well.  I have reassured her and I will see her back in her next appointment.

## 2018-03-02 NOTE — NURSING NOTE
"Chief Complaint   Patient presents with     RECHECK     Patient concerned about spot on foot post 1/19/18 angio - notes from 2/28/18 podiatry visit being faxed 3/1/18       Initial /80 (BP Location: Right arm, Patient Position: Chair, Cuff Size: Adult Regular)  Pulse 90  LMP  (LMP Unknown)  Breastfeeding? No Estimated body mass index is 27.28 kg/(m^2) as calculated from the following:    Height as of 1/4/18: 5' 6\" (1.676 m).    Weight as of 1/25/18: 169 lb (76.7 kg).  Medication Reconciliation: complete     Ruba Vallecillo MA   "

## 2018-03-07 ENCOUNTER — OFFICE VISIT (OUTPATIENT)
Dept: CARDIOLOGY | Facility: CLINIC | Age: 83
End: 2018-03-07
Attending: INTERNAL MEDICINE
Payer: MEDICARE

## 2018-03-07 VITALS
SYSTOLIC BLOOD PRESSURE: 154 MMHG | DIASTOLIC BLOOD PRESSURE: 70 MMHG | BODY MASS INDEX: 27 KG/M2 | WEIGHT: 168 LBS | HEIGHT: 66 IN | HEART RATE: 70 BPM

## 2018-03-07 DIAGNOSIS — I48.21 PERMANENT ATRIAL FIBRILLATION (H): ICD-10-CM

## 2018-03-07 DIAGNOSIS — I50.32 CHRONIC DIASTOLIC CONGESTIVE HEART FAILURE (H): ICD-10-CM

## 2018-03-07 DIAGNOSIS — I27.20 PULMONARY HYPERTENSION (H): ICD-10-CM

## 2018-03-07 DIAGNOSIS — I48.91 ATRIAL FIBRILLATION, UNSPECIFIED TYPE (H): ICD-10-CM

## 2018-03-07 DIAGNOSIS — I10 HYPERTENSION GOAL BP (BLOOD PRESSURE) < 140/90: Primary | ICD-10-CM

## 2018-03-07 PROCEDURE — 99214 OFFICE O/P EST MOD 30 MIN: CPT | Performed by: NURSE PRACTITIONER

## 2018-03-07 NOTE — Clinical Note
3/7/2018    Neisha Esparza MD  [address] [facility] [[email or web address] [address]  [facility] [address]    RE: Helene Gibbs       Dear Colleague,    I had the pleasure of seeing Helene Gibbs in the [facility].    HPI and Plan: #[id number]  See dictation    Orders Placed This Encounter   Procedures     Follow-Up with Electrophysiologist       No orders of the defined types were placed in this encounter.      Medications Discontinued During This Encounter   Medication Reason     diazepam 10 mg SUPP Stopped by Patient         Encounter Diagnoses   Name Primary?     Atrial fibrillation, unspecified type (H)      Hypertension goal BP (blood pressure) < 140/90 Yes     Chronic diastolic congestive heart failure (H)      Pulmonary hypertension      Permanent atrial fibrillation (H)        CURRENT MEDICATIONS:  Current Outpatient Prescriptions   Medication Sig Dispense Refill     HYDROcodone-acetaminophen (NORCO) 5-325 MG per tablet Take 1 tablet by mouth every 6 hours as needed for moderate to severe pain 90 tablet 0     GABAPENTIN PO Take 300 mg by mouth daily        GlipiZIDE (GLUCOTROL PO) Take 5 mg by mouth every morning (before breakfast)       tiotropium (SPIRIVA) 18 MCG capsule Inhale 18 mcg into the lungs every evening       nystatin (MYCOSTATIN) cream Apply topically 2 times daily as needed for dry skin       fluticasone (FLONASE) 50 MCG/ACT spray Spray 1-2 sprays into both nostrils every evening       furosemide (LASIX) 20 MG tablet TAKE 1-2 TABS BY MOUTH ONCE DAILY. MAY REPEAT AFTER NOON IF NEEDED FOR WEIGHT GAIN/2+ EDEMA 180 tablet 1     simvastatin (ZOCOR) 10 MG tablet TAKE 1 TABLET (10 MG) BY MOUTH AT BEDTIME 90 tablet 0     ranitidine (ZANTAC) 150 MG tablet Take 1 tablet (150 mg) by mouth 2 times daily 60 tablet 3     ondansetron (ZOFRAN ODT) 4 MG ODT tab Take 1 tablet (4 mg) by mouth every 6 hours as needed for nausea 30 tablet 1     apixaban ANTICOAGULANT (ELIQUIS) 2.5 MG tablet Take 1  tablet (2.5 mg) by mouth 2 times daily 60 tablet 3     lisinopril (PRINIVIL/ZESTRIL) 10 MG tablet Take 1 tablet (10 mg) by mouth daily 90 tablet 1     hydrOXYzine (VISTARIL) 25 MG capsule Take 1 capsule (25 mg) by mouth 2 times daily as needed for itching 180 capsule 1     hydrocortisone (ANUSOL-HC) 2.5 % rectal cream Place rectally 2 times daily as needed for hemorrhoids 28 g 1     Multiple Vitamins-Minerals (PRESERVISION/LUTEIN) CAPS Take 1 capsule by mouth daily        albuterol (PROAIR HFA, PROVENTIL HFA, VENTOLIN HFA) 108 (90 BASE) MCG/ACT inhaler Inhale 2 puffs into the lungs every 4 hours as needed for shortness of breath / dyspnea or wheezing 1 Inhaler 5     magnesium hydroxide (MILK OF MAGNESIA) 400 MG/5ML suspension Take 30 mLs by mouth At Bedtime        betamethasone valerate (VALISONE) 0.1 % cream Apply topically 2 times daily as needed (Use Sparingly)         ALLERGIES     Allergies   Allergen Reactions     Ambien [Zolpidem Tartrate] Visual Disturbance     Hallucinations and fell out of bed at night.     Penicillins Hives     Definity      Caused a syncopal episode.     Sulfa Drugs Itching     Cymbalta Rash     Fluconazole Rash       PAST MEDICAL HISTORY:  Past Medical History:   Diagnosis Date     Anemia      Ankle arthritis      Arthritis      Back pain      Bell's palsy 9/08    left     Breast CA (H) 2004-    left lumpectomy, radiation, -recurrence     Colon polyps     colonoscopy-9/12-next due in 9/13     Congestive heart failure (H)      COPD (chronic obstructive pulmonary disease) (H)      Coronary artery disease      DM (diabetes mellitus) (H)      GERD (gastroesophageal reflux disease)      Hyperlipidemia      Hypertension      Lumbar spinal stenosis     use cane     Obesity      Osteoporosis      Other chronic pain      Pacemaker      Permanent atrial fibrillation (H) 8/2008    S/P AV node ablation and pacemaker 10-25-12       Pleural effusion      Pulmonary hypertension      Rectal stenosis       Tobacco abuse     current     Tricuspid regurgitation        PAST SURGICAL HISTORY:  Past Surgical History:   Procedure Laterality Date     ARTHROSCOPY SHOULDER RT/LT  1999, 2004    Bilateral, right then left     BONE MARROW BIOPSY, BONE SPECIMEN, NEEDLE/TROCAR N/A 8/29/2017    Procedure: BIOPSY BONE MARROW;  BONE MARROW BIOPSY;  Surgeon: Marino Cohen MD;  Location:  GI     C DEXA, BONE DENSITY, AXIAL SKEL       C MAMMOGRAM, SCREENING  1/2009     COLONOSCOPY N/A 10/7/2016    Procedure: COMBINED COLONOSCOPY, SINGLE OR MULTIPLE BIOPSY/POLYPECTOMY BY BIOPSY;  Surgeon: Cassandra Mccord MD;  Location:  GI     ESOPHAGOSCOPY, GASTROSCOPY, DUODENOSCOPY (EGD), COMBINED N/A 8/10/2017    Procedure: COMBINED ESOPHAGOSCOPY, GASTROSCOPY, DUODENOSCOPY (EGD), BIOPSY SINGLE OR MULTIPLE;  gastroscopy;  Surgeon: Helene Bustamante MD;  Location:  GI     H ABLATION AV NODE  10/2012     HC COLONOSCOPY THRU STOMA, DIAGNOSTIC  ? 2005     IMPLANT PACEMAKER  10/2012    [place] [id number], [id number]     JOINT REPLACEMTN, KNEE RT/LT      Joint Replacement knee bilateral     LAPAROSCOPIC CHOLECYSTECTOMY WITH CHOLANGIOGRAMS N/A 12/14/2015    Procedure: LAPAROSCOPIC CHOLECYSTECTOMY WITH CHOLANGIOGRAMS;  Surgeon: Ervin Amos MD;  Location:  OR     LUMPECTOMY BREAST      Left-2004     PHACOEMULSIFICATION CLEAR CORNEA WITH STANDARD INTRAOCULAR LENS IMPLANT Left 7/16/2015    Procedure: PHACOEMULSIFICATION CLEAR CORNEA WITH STANDARD INTRAOCULAR LENS IMPLANT;  Surgeon: Sai Obregon MD;  Location:  EC     PHACOEMULSIFICATION CLEAR CORNEA WITH TORIC INTRAOCULAR LENS IMPLANT Right 5/9/2017    Procedure: PHACOEMULSIFICATION CLEAR CORNEA WITH TORIC INTRAOCULAR LENS IMPLANT;  RIGHT EYE PHACOEMULSIFICATION CLEAR CORNEA WITH TORIC INTRAOCULAR LENS IMPLANT ;  Surgeon: Duc Richmond MD;  Location: [facility]       FAMILY HISTORY:  Family History   Problem Relation Age of Onset     Hypertension Mother      DIABETES  "Mother       at 83 yrs     C.A.D. Father       age 70s     Breast Cancer No family hx of      Colon Cancer No family hx of        SOCIAL HISTORY:  Social History     Social History     Marital status:      Spouse name: N/A     Number of children: N/A     Years of education: N/A     Social History Main Topics     Smoking status: Current Some Day Smoker     Packs/day: 0.25     Years: 45.00     Types: Cigarettes     Smokeless tobacco: Never Used      Comment: 4-5 cigs/day *17     Alcohol use No     Drug use: No     Sexual activity: Not Currently     Partners: Female     Other Topics Concern     Caffeine Concern Yes     2 cups/day     Sleep Concern Yes     Stress Concern Yes     Weight Concern Yes     15+ lb weight loss since hospitalization      Special Diet Yes     bland diet r/t cholecystectomy, low salt      Exercise No     Seat Belt Yes     Social History Narrative    ,no childrenPOA- niece-Enedelia Claros-has plans to quitETOH-1/drink 2-3 /weekExercise-sit down DNR, DNI    Lived in [place] and [place] worked in [employer]       Review of Systems:  Skin:  Negative       Eyes:  Positive for glasses reading  ENT:  Negative      Respiratory:  Negative       Cardiovascular:  Negative      Gastroenterology: Negative      Genitourinary:  not assessed      Musculoskeletal:  Positive for back pain;arthritis    Neurologic:  Positive for numbness or tingling of feet    Psychiatric:  Positive for sleep disturbances    Heme/Lymph/Imm:  Negative      Endocrine:  Positive for diabetes      Physical Exam:  Vitals: /70  Pulse 70  Ht 1.676 m (5' 6\")  Wt 76.2 kg (168 lb)  LMP  (LMP Unknown)  BMI 27.12 kg/m2    Constitutional:  cooperative, alert and oriented, well developed, well nourished, in no acute distress        Skin:  warm and dry to the touch, no apparent skin lesions or masses noted   pacemaker incision in the left infraclavicular area was well-healed      Head:  " normocephalic, no masses or lesions        Eyes:  conjunctivae and lids unremarkable        Lymph:      ENT:  no pallor or cyanosis, dentition good        Neck:  carotid pulses are full and equal bilaterally, JVP normal, no carotid bruit        Respiratory:  clear to auscultation         Cardiac: regular rhythm, normal S1/S2, no S3 or S4, apical impulse not displaced, no murmurs, gallops or rubs                not assessed this visit                                        GI:  abdomen soft        Extremities and Muscular Skeletal:  no edema kyphosis            Neurological:  no gross motor deficits;affect appropriate        Psych:  Alert and Oriented x 3        CC  Jc Juarez MD  [address]  [place] [address]                Service Date: 03/07/2018      HISTORY OF PRESENT ILLNESS:  Ms. Gibbs is a delightful 82-year-old woman well known to me here at the clinic.  She is an established patient of Dr. Juarez.  She has a history of permanent atrial fibrillation with previous AV alexandra ablation and pacemaker implantation.  She also has a history of HFpEF and hypertension.      Unfortunately, over the past year she has had some issues that includes anemia of unknown etiology.  The patient's hemoglobin went down to 6.4 in 08/2017.  She was admitted to the hospital from her primary physician's clinic.  She underwent a GI workup which did not show any active bleeding source.  She was then referred to Dr. Cali with Hematology.  It appears, in reviewing the records that she has iron-deficiency anemia.  She is scheduled to meet with him again in April.  When she was anemic her Eliquis was initially discontinued.  She was then restarted at a lower dose of 2.5 mg b.i.d.  Her last hemoglobin was 11.2.  She has no concerns of bleeding at this time.      Another issue she was faced with was peripheral vascular disease of her left lower extremity.  She was seen and followed by Dr. Watson and underwent  revascularization and angioplasty for ischemic foot pain, which has since resolved.  The patient continues to have severe bilateral foot neuropathy which continues to be a struggle for her.        From a cardiac standpoint, she is feeling well.  She denies chest pain, shortness of breath, lightheadedness, dizziness or increased peripheral edema.      Device interrogation via Teletrace that showed 100% V paced and underlying rhythm with complete heart block achieved by AV alexandra ablation.  She is here today for followup.  All other review of systems and past medical history are noted below.      ASSESSMENT AND PLAN:  Ms. Gibbs is a delightful 82-year-old woman here today for followup.   1.  Atrial fibrillation with rapid ventricular response.  She underwent AV alexandra ablation and pacemaker implantation in 2012.  She was changed from warfarin to apixaban, but unfortunately became anemic with a hemoglobin down to 6.4 in 08/2017.  Her Eliquis was briefly placed on hold.  She is now back on Eliquis at 2.5 mg b.i.d.  Creatinine is normal.  She does meet criteria for 5 mg b.i.d. dosing.  I have asked that she discuss this further with Dr. Cali, her hematologist.    2.  Tricuspid regurgitation.  A followup echocardiogram was completed this year.  It was noted to be 3+ last year; now noted at 2+.  She also has mild pulmonary hypertension with RVSP at 35-45 mmHg plus RAP.  Other echo results show a low normal ejection fraction of 50%-55%.  RV function is normal without wall motion abnormality noted.  When compared to her previous echo from 08/10/2017, these results are similar.   3.  Diastolic heart failure.  Again, LV function is noted to be 50%-55% with mild elevation of her RVSP.  The patient has no complaints of heart failure.  However, she is on Lasix therapy.  She is also mindful now about watching her daily weights.      As always, thank you for including us in the care of this delightful patient.  If there are  questions or concerns, I have encouraged her to please contact us.  She will follow up with Dr. Juarez next year.  She is stable from a cardiac standpoint.         ANGELICA GRADY NP             D: 2018   T: 2018   MT: BLANQUITA      Name:     PATRICIA BOBO   MRN:      [id number]        Account:      [id number]   :      1935           Service Date: 2018      Document: [id number]       Thank you for allowing me to participate in the care of your patient.      Sincerely,     Angelica Grady NP, APRN [facility]    cc:   Jc Juarez MD  [address]  [place] [address]

## 2018-03-07 NOTE — PATIENT INSTRUCTIONS
Discuss Eliquis dosing with hematology () at your next visit. Is it safe to go back to 5 mg twice daily?    If blood pressure climbs may need to increase lisinopril 10 mg twice daily. You can discuss with     Call with any questions or concerns

## 2018-03-07 NOTE — MR AVS SNAPSHOT
After Visit Summary   3/7/2018    Helene Gibbs    MRN: [id number]           Patient Information     Date Of Birth          1935        Visit Information        Provider Department      3/7/2018 1:30 PM Angelica Grady, APRN [facility]        Today's Diagnoses     Hypertension goal BP (blood pressure) < 140/90    -  1    Atrial fibrillation, unspecified type (H)        Chronic diastolic congestive heart failure (H)        Pulmonary hypertension        Permanent atrial fibrillation (H)          Care Instructions    Discuss Eliquis dosing with hematology () at your next visit. Is it safe to go back to 5 mg twice daily?    If blood pressure climbs may need to increase lisinopril 10 mg twice daily. You can discuss with     Call with any questions or concerns          Follow-ups after your visit        Additional Services     Follow-Up with Electrophysiologist                 Your next 10 appointments already scheduled     Apr 17, 2018  1:00 PM CDT   Return Visit with  Oncology Nurse   [facility] ([facility])    [facility]  [address]  [facility] [address]   [phone]            Apr 17, 2018  2:00 PM CDT   Return Visit with Estiven Cali MD   [facility] ([facility])    [facility]  [address]  [facility] [address]   [phone]            May 01, 2018  1:15 PM CDT   Teletrace with STONE TECH1   [facility] ([facility])    [address]  [facility] [address] [phone]            May 04, 2018 11:00 AM CDT   US EMIGDIO DOPPLER NO EXERCISE 1-2 LVLS BILAT with VUS1   [facility] ([facility])    [address]  [place] [address]   [phone]           Please bring a list of  "your medicines (including vitamins, minerals and over-the-counter drugs). Also, tell your doctor about any allergies you may have. Wear comfortable clothes and leave your valuables at home.  No caffeine or tobacco for 1 hour prior to exam.  Please call the Imaging Department at your exam site with any questions.            May 04, 2018 11:30 AM CDT   Return Visit with Pieter Watson MD   [facility] ([facility])    [address]  [place] [address] [phone]              Future tests that were ordered for you today     Open Future Orders        Priority Expected Expires Ordered    Follow-Up with Electrophysiologist Routine 3/7/2019 3/8/2019 3/7/2018            Who to contact     If you have questions or need follow up information about today's clinic visit or your schedule please contact [facility] directly at [phone].  Normal or non-critical lab and imaging results will be communicated to you by Punctilhart, letter or phone within 4 business days after the clinic has received the results. If you do not hear from us within 7 days, please contact the clinic through [employer] or phone. If you have a critical or abnormal lab result, we will notify you by phone as soon as possible.  Submit refill requests through [employer] or call your pharmacy and they will forward the refill request to us. Please allow 3 business days for your refill to be completed.          Additional Information About Your Visit        [employer] Information     [employer] lets you send messages to your doctor, view your test results, renew your prescriptions, schedule appointments and more. To sign up, go to [email or web address] . Click on \"Log in\" on the left side of the screen, which will take you to the Welcome page. Then click on \"Sign up Now\" on the right side of the page.     You will be asked to enter the access " "code listed below, as well as some personal information. Please follow the directions to create your username and password.     Your access code is: [email or web address]  Expires: 2018  5:15 PM     Your access code will  in 90 days. If you need help or a new code, please call your [place] clinic or [phone].        Care EveryWhere ID     This is your Care EveryWhere ID. This could be used by other organizations to access your [place] medical records  [id number]        Your Vitals Were     Pulse Height Last Period BMI (Body Mass Index)          70 1.676 m (5' 6\") (LMP Unknown) 27.12 kg/m2         Blood Pressure from Last 3 Encounters:   18 154/70   18 151/80   18 139/71    Weight from Last 3 Encounters:   18 76.2 kg (168 lb)   18 76.7 kg (169 lb)   18 76.9 kg (169 lb 9.6 oz)              We Performed the Following     Follow-Up with Cardiac Advanced Practice Provider        Primary Care Provider Office Phone # Fax #    Neisha MARIMAR Esparza [phone] [address] [address]        Goals        General    start date: 14 I will weigh myself daily and if any weight gain or shortness of breath I will call the clinic. (pt-stated)     Notes - Note created  2014  3:59 PM by Margareth Pichardo, RN    As of today's date 2014 goal is met at 0 - 25%.   Goal Status:  Active        Equal Access to Services     : Hadii sukhdeep Brady, jefe valentino, pamela craft. So [facility] [phone].    ATENCIÓN: Si habla español, tiene a gupta disposición servicios gratuitos de asistencia lingüística. Llame al [phone].    We comply with applicable federal civil rights laws and [place] laws. We do not discriminate on the basis of race, color, national origin, age, disability, sex, sexual orientation, or gender identity.            Thank you!     " Thank you for choosing [facility]  for your care. Our goal is always to provide you with excellent care. Hearing back from our patients is one way we can continue to improve our services. Please take a few minutes to complete the written survey that you may receive in the mail after your visit with us. Thank you!             Your Updated Medication List - Protect others around you: Learn how to safely use, store and throw away your medicines at www.disposemymeds.org.          This list is accurate as of 3/7/18  2:12 PM.  Always use your most recent med list.                   Brand Name Dispense Instructions for use Diagnosis    albuterol 108 (90 BASE) MCG/ACT Inhaler    PROAIR HFA/PROVENTIL HFA/VENTOLIN HFA    1 Inhaler    Inhale 2 puffs into the lungs every 4 hours as needed for shortness of breath / dyspnea or wheezing    COPD (chronic obstructive pulmonary disease) (H)       apixaban ANTICOAGULANT 2.5 MG tablet    ELIQUIS    60 tablet    Take 1 tablet (2.5 mg) by mouth 2 times daily    Atrial fibrillation, unspecified type (H)       betamethasone valerate 0.1 % cream    VALISONE     Apply topically 2 times daily as needed (Use Sparingly)        fluticasone 50 MCG/ACT spray    FLONASE     Spray 1-2 sprays into both nostrils every evening        furosemide 20 MG tablet    LASIX    180 tablet    TAKE 1-2 TABS BY MOUTH ONCE DAILY. MAY REPEAT AFTER NOON IF NEEDED FOR WEIGHT GAIN/2+ EDEMA    Swelling       GABAPENTIN PO      Take 300 mg by mouth daily        GLUCOTROL PO      Take 5 mg by mouth every morning (before breakfast)        HYDROcodone-acetaminophen 5-325 MG per tablet    NORCO    90 tablet    Take 1 tablet by mouth every 6 hours as needed for moderate to severe pain    Intractable back pain, Scoliosis of lumbar spine, unspecified scoliosis type, Chronic midline low back pain with sciatica, sciatica laterality unspecified       hydrocortisone 2.5 % cream    ANUSOL-HC     28 g    Place rectally 2 times daily as needed for hemorrhoids    External hemorrhoids       hydrOXYzine 25 MG capsule    VISTARIL    180 capsule    Take 1 capsule (25 mg) by mouth 2 times daily as needed for itching    Itching       lisinopril 10 MG tablet    PRINIVIL/ZESTRIL    90 tablet    Take 1 tablet (10 mg) by mouth daily    Essential hypertension       magnesium hydroxide 400 MG/5ML suspension    MILK OF MAGNESIA     Take 30 mLs by mouth At Bedtime        nystatin cream    MYCOSTATIN     Apply topically 2 times daily as needed for dry skin        ondansetron 4 MG ODT tab    ZOFRAN ODT    30 tablet    Take 1 tablet (4 mg) by mouth every 6 hours as needed for nausea    Nausea       PRESERVISION/LUTEIN Caps      Take 1 capsule by mouth daily        ranitidine 150 MG tablet    ZANTAC    60 tablet    Take 1 tablet (150 mg) by mouth 2 times daily    Dyspepsia       simvastatin 10 MG tablet    ZOCOR    90 tablet    TAKE 1 TABLET (10 MG) BY MOUTH AT BEDTIME    Type 2 diabetes mellitus with diabetic nephropathy, without long-term current use of insulin (H)       tiotropium 18 MCG capsule    SPIRIVA     Inhale 18 mcg into the lungs every evening

## 2018-03-07 NOTE — PROGRESS NOTES
HPI and Plan: #[id number]  See dictation    Orders Placed This Encounter   Procedures     Follow-Up with Electrophysiologist       No orders of the defined types were placed in this encounter.      Medications Discontinued During This Encounter   Medication Reason     diazepam 10 mg SUPP Stopped by Patient         Encounter Diagnoses   Name Primary?     Atrial fibrillation, unspecified type (H)      Hypertension goal BP (blood pressure) < 140/90 Yes     Chronic diastolic congestive heart failure (H)      Pulmonary hypertension      Permanent atrial fibrillation (H)        CURRENT MEDICATIONS:  Current Outpatient Prescriptions   Medication Sig Dispense Refill     HYDROcodone-acetaminophen (NORCO) 5-325 MG per tablet Take 1 tablet by mouth every 6 hours as needed for moderate to severe pain 90 tablet 0     GABAPENTIN PO Take 300 mg by mouth daily        GlipiZIDE (GLUCOTROL PO) Take 5 mg by mouth every morning (before breakfast)       tiotropium (SPIRIVA) 18 MCG capsule Inhale 18 mcg into the lungs every evening       nystatin (MYCOSTATIN) cream Apply topically 2 times daily as needed for dry skin       fluticasone (FLONASE) 50 MCG/ACT spray Spray 1-2 sprays into both nostrils every evening       furosemide (LASIX) 20 MG tablet TAKE 1-2 TABS BY MOUTH ONCE DAILY. MAY REPEAT AFTER NOON IF NEEDED FOR WEIGHT GAIN/2+ EDEMA 180 tablet 1     simvastatin (ZOCOR) 10 MG tablet TAKE 1 TABLET (10 MG) BY MOUTH AT BEDTIME 90 tablet 0     ranitidine (ZANTAC) 150 MG tablet Take 1 tablet (150 mg) by mouth 2 times daily 60 tablet 3     ondansetron (ZOFRAN ODT) 4 MG ODT tab Take 1 tablet (4 mg) by mouth every 6 hours as needed for nausea 30 tablet 1     apixaban ANTICOAGULANT (ELIQUIS) 2.5 MG tablet Take 1 tablet (2.5 mg) by mouth 2 times daily 60 tablet 3     lisinopril (PRINIVIL/ZESTRIL) 10 MG tablet Take 1 tablet (10 mg) by mouth daily 90 tablet 1     hydrOXYzine (VISTARIL) 25 MG capsule Take 1 capsule (25 mg) by mouth 2 times daily as  needed for itching 180 capsule 1     hydrocortisone (ANUSOL-HC) 2.5 % rectal cream Place rectally 2 times daily as needed for hemorrhoids 28 g 1     Multiple Vitamins-Minerals (PRESERVISION/LUTEIN) CAPS Take 1 capsule by mouth daily        albuterol (PROAIR HFA, PROVENTIL HFA, VENTOLIN HFA) 108 (90 BASE) MCG/ACT inhaler Inhale 2 puffs into the lungs every 4 hours as needed for shortness of breath / dyspnea or wheezing 1 Inhaler 5     magnesium hydroxide (MILK OF MAGNESIA) 400 MG/5ML suspension Take 30 mLs by mouth At Bedtime        betamethasone valerate (VALISONE) 0.1 % cream Apply topically 2 times daily as needed (Use Sparingly)         ALLERGIES     Allergies   Allergen Reactions     Ambien [Zolpidem Tartrate] Visual Disturbance     Hallucinations and fell out of bed at night.     Penicillins Hives     Definity      Caused a syncopal episode.     Sulfa Drugs Itching     Cymbalta Rash     Fluconazole Rash       PAST MEDICAL HISTORY:  Past Medical History:   Diagnosis Date     Anemia      Ankle arthritis      Arthritis      Back pain      Bell's palsy 9/08    left     Breast CA (H) 2004-    left lumpectomy, radiation, -recurrence     Colon polyps     colonoscopy-9/12-next due in 9/13     Congestive heart failure (H)      COPD (chronic obstructive pulmonary disease) (H)      Coronary artery disease      DM (diabetes mellitus) (H)      GERD (gastroesophageal reflux disease)      Hyperlipidemia      Hypertension      Lumbar spinal stenosis     use cane     Obesity      Osteoporosis      Other chronic pain      Pacemaker      Permanent atrial fibrillation (H) 8/2008    S/P AV node ablation and pacemaker 10-25-12       Pleural effusion      Pulmonary hypertension      Rectal stenosis      Tobacco abuse     current     Tricuspid regurgitation        PAST SURGICAL HISTORY:  Past Surgical History:   Procedure Laterality Date     ARTHROSCOPY SHOULDER RT/LT  1999, 2004    Bilateral, right then left     BONE MARROW BIOPSY,  BONE SPECIMEN, NEEDLE/TROCAR N/A 2017    Procedure: BIOPSY BONE MARROW;  BONE MARROW BIOPSY;  Surgeon: Marino Cohen MD;  Location:  GI     C DEXA, BONE DENSITY, AXIAL SKEL       C MAMMOGRAM, SCREENING  2009     COLONOSCOPY N/A 10/7/2016    Procedure: COMBINED COLONOSCOPY, SINGLE OR MULTIPLE BIOPSY/POLYPECTOMY BY BIOPSY;  Surgeon: Cassandra Mccord MD;  Location:  GI     ESOPHAGOSCOPY, GASTROSCOPY, DUODENOSCOPY (EGD), COMBINED N/A 8/10/2017    Procedure: COMBINED ESOPHAGOSCOPY, GASTROSCOPY, DUODENOSCOPY (EGD), BIOPSY SINGLE OR MULTIPLE;  gastroscopy;  Surgeon: Helene Bustamante MD;  Location:  GI     H ABLATION AV NODE  10/2012     HC COLONOSCOPY THRU STOMA, DIAGNOSTIC  ? 2005     IMPLANT PACEMAKER  10/2012    [place] [id number], [id number]     JOINT REPLACEMTN, KNEE RT/LT      Joint Replacement knee bilateral     LAPAROSCOPIC CHOLECYSTECTOMY WITH CHOLANGIOGRAMS N/A 2015    Procedure: LAPAROSCOPIC CHOLECYSTECTOMY WITH CHOLANGIOGRAMS;  Surgeon: Ervin Amos MD;  Location:  OR     LUMPECTOMY BREAST      Left-     PHACOEMULSIFICATION CLEAR CORNEA WITH STANDARD INTRAOCULAR LENS IMPLANT Left 2015    Procedure: PHACOEMULSIFICATION CLEAR CORNEA WITH STANDARD INTRAOCULAR LENS IMPLANT;  Surgeon: Sai Obregon MD;  Location: [facility]     PHACOEMULSIFICATION CLEAR CORNEA WITH TORIC INTRAOCULAR LENS IMPLANT Right 2017    Procedure: PHACOEMULSIFICATION CLEAR CORNEA WITH TORIC INTRAOCULAR LENS IMPLANT;  RIGHT EYE PHACOEMULSIFICATION CLEAR CORNEA WITH TORIC INTRAOCULAR LENS IMPLANT ;  Surgeon: Duc Richmond MD;  Location:  EC       FAMILY HISTORY:  Family History   Problem Relation Age of Onset     Hypertension Mother      DIABETES Mother       at 83 yrs     C.A.D. Father       age 70s     Breast Cancer No family hx of      Colon Cancer No family hx of        SOCIAL HISTORY:  Social History     Social History     Marital status:      Spouse name: N/A  "    Number of children: N/A     Years of education: N/A     Social History Main Topics     Smoking status: Current Some Day Smoker     Packs/day: 0.25     Years: 45.00     Types: Cigarettes     Smokeless tobacco: Never Used      Comment: 4-5 cigs/day *12/22/17     Alcohol use No     Drug use: No     Sexual activity: Not Currently     Partners: Female     Other Topics Concern     Caffeine Concern Yes     2 cups/day     Sleep Concern Yes     Stress Concern Yes     Weight Concern Yes     15+ lb weight loss since hospitalization      Special Diet Yes     bland diet r/t cholecystectomy, low salt      Exercise No     Seat Belt Yes     Social History Narrative    ,no childrenPOA- niece-Enedelia Claros-has plans to quitETOH-1/drink 2-3 /weekExercise-sit down DNR, DNI    Lived in [place] and [place] worked in [place]       Review of Systems:  Skin:  Negative       Eyes:  Positive for glasses reading  ENT:  Negative      Respiratory:  Negative       Cardiovascular:  Negative      Gastroenterology: Negative      Genitourinary:  not assessed      Musculoskeletal:  Positive for back pain;arthritis    Neurologic:  Positive for numbness or tingling of feet    Psychiatric:  Positive for sleep disturbances    Heme/Lymph/Imm:  Negative      Endocrine:  Positive for diabetes      Physical Exam:  Vitals: /70  Pulse 70  Ht 1.676 m (5' 6\")  Wt 76.2 kg (168 lb)  LMP  (LMP Unknown)  BMI 27.12 kg/m2    Constitutional:  cooperative, alert and oriented, well developed, well nourished, in no acute distress        Skin:  warm and dry to the touch, no apparent skin lesions or masses noted   pacemaker incision in the left infraclavicular area was well-healed      Head:  normocephalic, no masses or lesions        Eyes:  conjunctivae and lids unremarkable        Lymph:      ENT:  no pallor or cyanosis, dentition good        Neck:  carotid pulses are full and equal bilaterally, JVP normal, no carotid bruit    "     Respiratory:  clear to auscultation         Cardiac: regular rhythm, normal S1/S2, no S3 or S4, apical impulse not displaced, no murmurs, gallops or rubs                not assessed this visit                                        GI:  abdomen soft        Extremities and Muscular Skeletal:  no edema kyphosis            Neurological:  no gross motor deficits;affect appropriate        Psych:  Alert and Oriented x 3        CC  Jc Juarez MD  [address]  [place] [address]

## 2018-03-07 NOTE — LETTER
3/7/2018      Neisha Esparza MD  [address]  [facility] [address]      RE: Helene Gibbs       Dear Colleague,    I had the pleasure of seeing Helene Gibbs in the [facility].    Service Date: 03/07/2018      HISTORY OF PRESENT ILLNESS:  Ms. Gibbs is a delightful 82-year-old woman well known to me here at the clinic.  She is an established patient of Dr. Juarez.  She has a history of permanent atrial fibrillation with previous AV alexandra ablation and pacemaker implantation.  She also has a history of HFpEF and hypertension.      Unfortunately, over the past year she has had some issues that includes anemia of unknown etiology.  The patient's hemoglobin went down to 6.4 in 08/2017.  She was admitted to the hospital from her primary physician's clinic.  She underwent a GI workup which did not show any active bleeding source.  She was then referred to Dr. Cali with Hematology.  It appears, in reviewing the records that she has iron-deficiency anemia.  She is scheduled to meet with him again in April.  When she was anemic her Eliquis was initially discontinued.  She was then restarted at a lower dose of 2.5 mg b.i.d.  Her last hemoglobin was 11.2.  She has no concerns of bleeding at this time.      Another issue she was faced with was peripheral vascular disease of her left lower extremity.  She was seen and followed by Dr. Watson and underwent revascularization and angioplasty for ischemic foot pain, which has since resolved.  The patient continues to have severe bilateral foot neuropathy which continues to be a struggle for her.        From a cardiac standpoint, she is feeling well.  She denies chest pain, shortness of breath, lightheadedness, dizziness or increased peripheral edema.      Device interrogation via Teletrace that showed 100% V paced and underlying rhythm with complete heart block achieved by AV alexandra ablation.  She is here today for  followup.  All other review of systems and past medical history are noted below.      ASSESSMENT AND PLAN:  Ms. Gibbs is a delightful 82-year-old woman here today for followup.   1.  Atrial fibrillation with rapid ventricular response.  She underwent AV alexandra ablation and pacemaker implantation in .  She was changed from warfarin to apixaban, but unfortunately became anemic with a hemoglobin down to 6.4 in 2017.  Her Eliquis was briefly placed on hold.  She is now back on Eliquis at 2.5 mg b.i.d.  Creatinine is normal.  She does meet criteria for 5 mg b.i.d. dosing.  I have asked that she discuss this further with Dr. Cali, her hematologist.    2.  Tricuspid regurgitation.  A followup echocardiogram was completed this year.  It was noted to be 3+ last year; now noted at 2+.  She also has mild pulmonary hypertension with RVSP at 35-45 mmHg plus RAP.  Other echo results show a low normal ejection fraction of 50%-55%.  RV function is normal without wall motion abnormality noted.  When compared to her previous echo from 08/10/2017, these results are similar.   3.  Diastolic heart failure.  Again, LV function is noted to be 50%-55% with mild elevation of her RVSP.  The patient has no complaints of heart failure.  However, she is on Lasix therapy.  She is also mindful now about watching her daily weights.      As always, thank you for including us in the care of this delightful patient.  If there are questions or concerns, I have encouraged her to please contact us.  She will follow up with Dr. Juarez next year.  She is stable from a cardiac standpoint.         VANESA SALCIDO NP             D: 2018   T: 2018   MT: BLANQUITA      Name:     PATRICIA GIBBS   MRN:      [id number]        Account:      [id number]   :      1935           Service Date: 2018      Document: [id number]         Outpatient Encounter Prescriptions as of 3/7/2018   Medication Sig Dispense Refill      HYDROcodone-acetaminophen (NORCO) 5-325 MG per tablet Take 1 tablet by mouth every 6 hours as needed for moderate to severe pain 90 tablet 0     GABAPENTIN PO Take 300 mg by mouth daily        GlipiZIDE (GLUCOTROL PO) Take 5 mg by mouth every morning (before breakfast)       tiotropium (SPIRIVA) 18 MCG capsule Inhale 18 mcg into the lungs every evening       nystatin (MYCOSTATIN) cream Apply topically 2 times daily as needed for dry skin       fluticasone (FLONASE) 50 MCG/ACT spray Spray 1-2 sprays into both nostrils every evening       furosemide (LASIX) 20 MG tablet TAKE 1-2 TABS BY MOUTH ONCE DAILY. MAY REPEAT AFTER NOON IF NEEDED FOR WEIGHT GAIN/2+ EDEMA 180 tablet 1     [DISCONTINUED] simvastatin (ZOCOR) 10 MG tablet TAKE 1 TABLET (10 MG) BY MOUTH AT BEDTIME 90 tablet 0     ranitidine (ZANTAC) 150 MG tablet Take 1 tablet (150 mg) by mouth 2 times daily 60 tablet 3     ondansetron (ZOFRAN ODT) 4 MG ODT tab Take 1 tablet (4 mg) by mouth every 6 hours as needed for nausea 30 tablet 1     apixaban ANTICOAGULANT (ELIQUIS) 2.5 MG tablet Take 1 tablet (2.5 mg) by mouth 2 times daily 60 tablet 3     lisinopril (PRINIVIL/ZESTRIL) 10 MG tablet Take 1 tablet (10 mg) by mouth daily 90 tablet 1     hydrOXYzine (VISTARIL) 25 MG capsule Take 1 capsule (25 mg) by mouth 2 times daily as needed for itching 180 capsule 1     hydrocortisone (ANUSOL-HC) 2.5 % rectal cream Place rectally 2 times daily as needed for hemorrhoids 28 g 1     Multiple Vitamins-Minerals (PRESERVISION/LUTEIN) CAPS Take 1 capsule by mouth daily        albuterol (PROAIR HFA, PROVENTIL HFA, VENTOLIN HFA) 108 (90 BASE) MCG/ACT inhaler Inhale 2 puffs into the lungs every 4 hours as needed for shortness of breath / dyspnea or wheezing 1 Inhaler 5     magnesium hydroxide (MILK OF MAGNESIA) 400 MG/5ML suspension Take 30 mLs by mouth At Bedtime        [DISCONTINUED] diazepam 10 mg SUPP Insert one suppository vaginally every night at bed time as needed 40  suppository 1     betamethasone valerate (VALISONE) 0.1 % cream Apply topically 2 times daily as needed (Use Sparingly)       No facility-administered encounter medications on file as of 3/7/2018.        Again, thank you for allowing me to participate in the care of your patient.      Sincerely,    Angelica Grady NP, APRN [facility]

## 2018-03-08 NOTE — PROGRESS NOTES
Service Date: 03/07/2018      HISTORY OF PRESENT ILLNESS:  Ms. Gibbs is a delightful 82-year-old woman well known to me here at the clinic.  She is an established patient of Dr. Juarez.  She has a history of permanent atrial fibrillation with previous AV alexandra ablation and pacemaker implantation.  She also has a history of HFpEF and hypertension.      Unfortunately, over the past year she has had some issues that includes anemia of unknown etiology.  The patient's hemoglobin went down to 6.4 in 08/2017.  She was admitted to the hospital from her primary physician's clinic.  She underwent a GI workup which did not show any active bleeding source.  She was then referred to Dr. Cali with Hematology.  It appears, in reviewing the records that she has iron-deficiency anemia.  She is scheduled to meet with him again in April.  When she was anemic her Eliquis was initially discontinued.  She was then restarted at a lower dose of 2.5 mg b.i.d.  Her last hemoglobin was 11.2.  She has no concerns of bleeding at this time.      Another issue she was faced with was peripheral vascular disease of her left lower extremity.  She was seen and followed by Dr. Watson and underwent revascularization and angioplasty for ischemic foot pain, which has since resolved.  The patient continues to have severe bilateral foot neuropathy which continues to be a struggle for her.        From a cardiac standpoint, she is feeling well.  She denies chest pain, shortness of breath, lightheadedness, dizziness or increased peripheral edema.      Device interrogation via Teletrace that showed 100% V paced and underlying rhythm with complete heart block achieved by AV alexandra ablation.  She is here today for followup.  All other review of systems and past medical history are noted below.      ASSESSMENT AND PLAN:  Ms. Gibbs is a delightful 82-year-old woman here today for followup.   1.  Atrial fibrillation with rapid ventricular response.    She  underwent AV alexandra ablation and pacemaker implantation in .  She was changed from warfarin to apixaban, but unfortunately became anemic with a hemoglobin down to 6.4 in 2017.  Her Eliquis was briefly placed on hold.  She is now back on Eliquis at 2.5 mg b.i.d.  Creatinine is normal.  She does meet criteria for 5 mg b.i.d. dosing.  I have asked that she discuss this further with Dr. Cali, her hematologist.    2.  Tricuspid regurgitation.    A followup echocardiogram was completed this year.  It was noted to be 3+ last year; now noted at 2+.  She also has mild pulmonary hypertension with RVSP at 35-45 mmHg plus RAP.  Other echo results show a low normal ejection fraction of 50%-55%.  RV function is normal without wall motion abnormality noted.  When compared to her previous echo from 08/10/2017, these results are similar.   3.  Diastolic heart failure.    LV function is noted to be 50%-55% with mild elevation of her RVSP.  The patient has no complaints of symptomatic heart failure.  However, she is on Lasix therapy.  She is also mindful now about watching her daily weights.      As always, thank you for including us in the care of this delightful patient.  If there are questions or concerns, I have encouraged her to please contact us.  She will follow up with Dr. Juarez next year.  She is stable from a cardiac standpoint.         VANESA SALCIDO NP             D: 2018   T: 2018   MT: BLANQUITA      Name:     PATRICIA BOBO   MRN:      [id number]        Account:      [id number]   :      1935           Service Date: 2018      Document: [id number]

## 2018-03-13 DIAGNOSIS — E11.21 TYPE 2 DIABETES MELLITUS WITH DIABETIC NEPHROPATHY, WITHOUT LONG-TERM CURRENT USE OF INSULIN (H): ICD-10-CM

## 2018-03-13 NOTE — TELEPHONE ENCOUNTER
"Last Written Prescription Date:  12/13/17  Last Fill Quantity: 90 tablet,  # refills: 0   Last office visit: 1/25/2018 with prescribing provider:  Vilma   Future Office Visit:   Next 5 appointments (look out 90 days)     Mar 22, 2018 12:30 PM CDT   [facility]    [address] [facility] [address]   [phone]            Apr 17, 2018  1:00 PM CDT   Return Visit with  Oncology Nurse   [facility]    [facility]  [address]  [facility] [address]   [phone]            Apr 17, 2018  2:00 PM CDT   Return Visit with Estiven Cali MD   [facility]    [facility]  [address]  [facility] [address]   [phone]            May 04, 2018 11:30 AM CDT   Return Visit with Pieter Watson MD   [facility]    [address]  [facility] [address] [phone]                 Requested Prescriptions   Pending Prescriptions Disp Refills     simvastatin (ZOCOR) 10 MG tablet [Pharmacy Med Name: SIMVASTATIN 10 MG TABLET] 90 tablet 0     Sig: TAKE 1 TABLET (10 MG) BY MOUTH AT BEDTIME    Statins Protocol Passed    3/13/2018 11:40 AM       Passed - LDL on file in past 12 months    Recent Labs   Lab Test  01/18/18   1129   LDL  40            Passed - No abnormal creatine kinase in past 12 months    No lab results found.         Passed - Recent (12 mo) or future (30 days) visit within the authorizing provider's specialty    Patient had office visit in the last 12 months or has a visit in the next 30 days with authorizing provider or within the authorizing provider's specialty.  See \"Patient Info\" tab in inbasket, or \"Choose Columns\" in Meds & Orders section of the refill encounter.           Passed - " Patient is age 18 or older       Passed - No active pregnancy on record       Passed - No positive pregnancy test in past 12 months

## 2018-03-14 RX ORDER — SIMVASTATIN 10 MG
TABLET ORAL
Qty: 90 TABLET | Refills: 2 | Status: SHIPPED | OUTPATIENT
Start: 2018-03-14 | End: 2018-01-01

## 2018-03-20 DIAGNOSIS — R10.13 DYSPEPSIA: ICD-10-CM

## 2018-03-21 NOTE — TELEPHONE ENCOUNTER
"Last Written Prescription Date:  11/1/2017  Last Fill Quantity: 60,  # refills: 3   Last office visit: 1/25/2018 with prescribing provider:  Vilma   Future Office Visit:   Next 5 appointments (look out 90 days)     Mar 22, 2018 12:30 PM CDT   Office Visit with Neisha Esparza MD   [facility]    [address] [facility] [address]   [phone]            Apr 17, 2018  1:00 PM CDT   Return Visit with  Oncology Nurse   [facility]    [facility]  [address]  [facility] [address]   [phone]            Apr 17, 2018  2:00 PM CDT   Return Visit with Estiven Cali MD   [facility]    [facility]  [address]  [facility] [address]   [phone]            May 04, 2018 11:30 AM CDT   Return Visit with Pieter Watson MD   [facility] ([facility])    [address]  [facility] [address] [phone]                 Requested Prescriptions   Pending Prescriptions Disp Refills     ranitidine (ZANTAC) 150 MG tablet [Pharmacy Med Name: RANITIDINE HCL 150MG TABS] 60 tablet 3     Sig: TAKE ONE TABLET BY MOUTH TWO TIMES A DAY    H2 Blockers Protocol Passed    3/20/2018  9:46 PM       Passed - Patient is age 12 or older       Passed - Recent (12 mo) or future (30 days) visit within the authorizing provider's specialty    Patient had office visit in the last 12 months or has a visit in the next 30 days with authorizing provider or within the authorizing provider's specialty.  See \"Patient Info\" tab in inbasket, or \"Choose Columns\" in Meds & Orders section of the refill encounter.            Prescription approved per [facility] Refill Protocol.    Ruba Serrano, MARIOLA, RN, PHN    "

## 2018-03-22 ENCOUNTER — OFFICE VISIT (OUTPATIENT)
Dept: FAMILY MEDICINE | Facility: CLINIC | Age: 83
End: 2018-03-22
Payer: MEDICARE

## 2018-03-22 VITALS
OXYGEN SATURATION: 98 % | HEIGHT: 66 IN | SYSTOLIC BLOOD PRESSURE: 154 MMHG | WEIGHT: 169 LBS | HEART RATE: 70 BPM | DIASTOLIC BLOOD PRESSURE: 76 MMHG | BODY MASS INDEX: 27.16 KG/M2 | TEMPERATURE: 97.7 F

## 2018-03-22 DIAGNOSIS — I48.21 PERMANENT ATRIAL FIBRILLATION (H): ICD-10-CM

## 2018-03-22 DIAGNOSIS — G89.29 CHRONIC MIDLINE LOW BACK PAIN WITH SCIATICA, SCIATICA LATERALITY UNSPECIFIED: ICD-10-CM

## 2018-03-22 DIAGNOSIS — I10 ESSENTIAL HYPERTENSION: ICD-10-CM

## 2018-03-22 DIAGNOSIS — R79.89 ELEVATED SERUM CREATININE: ICD-10-CM

## 2018-03-22 DIAGNOSIS — M54.9 INTRACTABLE BACK PAIN: ICD-10-CM

## 2018-03-22 DIAGNOSIS — I89.0 LYMPHEDEMA OF RIGHT LOWER EXTREMITY: Primary | ICD-10-CM

## 2018-03-22 DIAGNOSIS — M41.9 SCOLIOSIS OF LUMBAR SPINE, UNSPECIFIED SCOLIOSIS TYPE: ICD-10-CM

## 2018-03-22 DIAGNOSIS — E11.21 TYPE 2 DIABETES MELLITUS WITH DIABETIC NEPHROPATHY, WITHOUT LONG-TERM CURRENT USE OF INSULIN (H): ICD-10-CM

## 2018-03-22 DIAGNOSIS — D64.9 ANEMIA, UNSPECIFIED TYPE: ICD-10-CM

## 2018-03-22 DIAGNOSIS — J44.9 CHRONIC OBSTRUCTIVE PULMONARY DISEASE, UNSPECIFIED COPD TYPE (H): ICD-10-CM

## 2018-03-22 DIAGNOSIS — M54.40 CHRONIC MIDLINE LOW BACK PAIN WITH SCIATICA, SCIATICA LATERALITY UNSPECIFIED: ICD-10-CM

## 2018-03-22 LAB
ERYTHROCYTE [DISTWIDTH] IN BLOOD BY AUTOMATED COUNT: 14.9 % (ref 10–15)
HBA1C MFR BLD: 6.8 % (ref 4.3–6)
HCT VFR BLD AUTO: 35.7 % (ref 35–47)
HGB BLD-MCNC: 10.9 G/DL (ref 11.7–15.7)
MCH RBC QN AUTO: 27.9 PG (ref 26.5–33)
MCHC RBC AUTO-ENTMCNC: 30.5 G/DL (ref 31.5–36.5)
MCV RBC AUTO: 91 FL (ref 78–100)
PLATELET # BLD AUTO: 248 10E9/L (ref 150–450)
RBC # BLD AUTO: 3.91 10E12/L (ref 3.8–5.2)
WBC # BLD AUTO: 5.5 10E9/L (ref 4–11)

## 2018-03-22 PROCEDURE — 36415 COLL VENOUS BLD VENIPUNCTURE: CPT | Performed by: INTERNAL MEDICINE

## 2018-03-22 PROCEDURE — 83036 HEMOGLOBIN GLYCOSYLATED A1C: CPT | Performed by: INTERNAL MEDICINE

## 2018-03-22 PROCEDURE — 82565 ASSAY OF CREATININE: CPT | Performed by: INTERNAL MEDICINE

## 2018-03-22 PROCEDURE — 99215 OFFICE O/P EST HI 40 MIN: CPT | Performed by: INTERNAL MEDICINE

## 2018-03-22 PROCEDURE — 85027 COMPLETE CBC AUTOMATED: CPT | Performed by: INTERNAL MEDICINE

## 2018-03-22 PROCEDURE — 82728 ASSAY OF FERRITIN: CPT | Performed by: INTERNAL MEDICINE

## 2018-03-22 RX ORDER — HYDROCODONE BITARTRATE AND ACETAMINOPHEN 5; 325 MG/1; MG/1
1 TABLET ORAL EVERY 6 HOURS PRN
Qty: 90 TABLET | Refills: 0 | Status: SHIPPED | OUTPATIENT
Start: 2018-03-22 | End: 2018-04-11 | Stop reason: ALTCHOICE

## 2018-03-22 RX ORDER — LISINOPRIL 10 MG/1
10 TABLET ORAL 2 TIMES DAILY
Qty: 180 TABLET | Refills: 1 | Status: SHIPPED | OUTPATIENT
Start: 2018-03-22 | End: 2018-04-11

## 2018-03-22 NOTE — MR AVS SNAPSHOT
After Visit Summary   3/22/2018    Helene Gibbs    MRN: [id number]           Patient Information     Date Of Birth          1935        Visit Information        Provider Department      3/22/2018 12:30 PM Neisha Esparza MD [facility]        Today's Diagnoses     Lymphedema of right lower extremity    -  1    Essential hypertension        Type 2 diabetes mellitus with diabetic nephropathy, without long-term current use of insulin (H)        Anemia, unspecified type        Elevated serum creatinine          Care Instructions    Labs today  I refilled your prescriptions  Take lisinopril 10 mg twice daily  Monitor your blood pressure once a week at home.  Bring those readings on your next visit.  Notify us if your blood pressure readings consistently stays greater than 140/90.  Keep upcoming appointment with Dr. Cali  Speak with Dr. Cali about doses for Eliquis  Schedule an appointment for lymphedema therapy at your earliest convenience  Follow up in 2 months.  Seek sooner medical attention if there is any worsening of symptoms or problems          Follow-ups after your visit        Additional Services     LYMPHEDEMA THERAPY REFERRAL       *This therapy referral will be filtered to a centralized scheduling office at [facility] and the patient will receive a call to schedule an appointment at a [facility] location most convenient for them. *   If you have not heard from the scheduling office within 2 business days, please call [phone] for all locations, with the exception of [place], please call [phone].     Treatment: PT or OT Evaluation & Treatment  Special Instructions:   PT/OT Treatment Diagnosis: Lymphedema    Please be aware that coverage of these services is subject to the terms and limitations of your health insurance plan.  Call member services at your health plan with any benefit or coverage questions.      **Note to Provider:  If you are  "referring outside of [facility] for the therapy appointment, please list the name of the location in the \"special instructions\" above, print the referral and give to the patient to schedule the appointment.                  Your next 10 appointments already scheduled     Apr 17, 2018  1:00 PM CDT   Return Visit with  Oncology Nurse   [facility] ([facility])    [facility]  [address]  [place] [address] [phone]            Apr 17, 2018  2:00 PM CDT   Return Visit with Estiven Cali MD   [facility] ([facility])    [facility]  [address]  [facility] [address] [phone]            May 01, 2018  1:15 PM CDT   Teletrace with STONE TECH1   [facility] ([facility])    [address]  [facility] [address] [phone] OPT 2            May 04, 2018 11:00 AM CDT   US EMIGDIO DOPPLER NO EXERCISE 1-2 LVLS BILAT with VUS1   [facility] ([facility])    [address]  [facility] [address]   [phone]           Please bring a list of your medicines (including vitamins, minerals and over-the-counter drugs). Also, tell your doctor about any allergies you may have. Wear comfortable clothes and leave your valuables at home.  No caffeine or tobacco for 1 hour prior to exam.  Please call the Imaging Department at your exam site with any questions.            May 04, 2018 11:30 AM CDT   Return Visit with Pieter Watson MD   [facility] ([facility])    [address]  [facility] [address] [phone]              Who to contact     If you have questions or need follow up information about today's clinic visit or your schedule please contact [facility] directly at " "[phone].  Normal or non-critical lab and imaging results will be communicated to you by MyChart, letter or phone within 4 business days after the clinic has received the results. If you do not hear from us within 7 days, please contact the clinic through [email or web address] or phone. If you have a critical or abnormal lab result, we will notify you by phone as soon as possible.  Submit refill requests through [employer] or call your pharmacy and they will forward the refill request to us. Please allow 3 business days for your refill to be completed.          Additional Information About Your Visit        [email or web address] Measurement Information     Inkshares lets you send messages to your doctor, view your test results, renew your prescriptions, schedule appointments and more. To sign up, go to [email or web address] . Click on \"Log in\" on the left side of the screen, which will take you to the Welcome page. Then click on \"Sign up Now\" on the right side of the page.     You will be asked to enter the access code listed below, as well as some personal information. Please follow the directions to create your username and password.     Your access code is: [email or web address]  Expires: 2018  6:15 PM     Your access code will  in 90 days. If you need help or a new code, please call your [facility] clinic or [phone].        Care EveryWhere ID     This is your Care EveryWhere ID. This could be used by other organizations to access your [facility] medical records  [id number]        Your Vitals Were     Pulse Temperature Height Last Period Pulse Oximetry Breastfeeding?    70 97.7  F (36.5  C) (Tympanic) 5' 6\" (1.676 m) (LMP Unknown) 98% No    BMI (Body Mass Index)                   27.28 kg/m2            Blood Pressure from Last 3 Encounters:   18 154/76   18 154/70   18 151/80    Weight from Last 3 Encounters:   18 169 lb (76.7 kg)   18 168 lb (76.2 kg)   18 169 lb (76.7 kg)              We Performed the " Following     CBC with platelets     Creatinine     Ferritin     Hemoglobin A1c     LYMPHEDEMA THERAPY REFERRAL          Today's Medication Changes          These changes are accurate as of 3/22/18  1:10 PM.  If you have any questions, ask your nurse or doctor.               These medicines have changed or have updated prescriptions.        Dose/Directions    lisinopril 10 MG tablet   Commonly known as:  PRINIVIL/ZESTRIL   This may have changed:  when to take this   Used for:  Essential hypertension   Changed by:  Neisha Esparza MD        Dose:  10 mg   Take 1 tablet (10 mg) by mouth 2 times daily   Quantity:  180 tablet   Refills:  1            Where to get your medicines      These medications were sent to [facility] [place] [place] - [address]  [address], [place] [address]     Phone:  [phone]     lisinopril 10 MG tablet                Primary Care Provider Office Phone # Fax #    Neisha Esparza [phone] [address]  [facility] [address]        Goals        General    start date: 4/17/14 I will weigh myself daily and if any weight gain or shortness of breath I will call the clinic. (pt-stated)     Notes - Note created  4/17/2014  3:59 PM by Margareth Pichardo RN    As of today's date 4/17/2014 goal is met at 0 - 25%.   Goal Status:  Active        Equal Access to Services     [facility] Hospital[facility]AR AH: Hadii sukhdeep Brady, waaxda luqadaha, qaybta kaalmada maddy, pamela mccullough . So [facility] [phone].    ATENCIÓN: Si habla español, tiene a gupta disposición servicios gratuitos de asistencia lingüística. Llame al [phone].    We comply with applicable federal civil rights laws and [place] laws. We do not discriminate on the basis of race, color, national origin, age, disability, sex, sexual orientation, or gender identity.            Thank you!     Thank you for choosing  [facility]  for your care. Our goal is always to provide you with excellent care. Hearing back from our patients is one way we can continue to improve our services. Please take a few minutes to complete the written survey that you may receive in the mail after your visit with us. Thank you!             Your Updated Medication List - Protect others around you: Learn how to safely use, store and throw away your medicines at www.disposemymeds.org.          This list is accurate as of 3/22/18  1:10 PM.  Always use your most recent med list.                   Brand Name Dispense Instructions for use Diagnosis    albuterol 108 (90 BASE) MCG/ACT Inhaler    PROAIR HFA/PROVENTIL HFA/VENTOLIN HFA    1 Inhaler    Inhale 2 puffs into the lungs every 4 hours as needed for shortness of breath / dyspnea or wheezing    COPD (chronic obstructive pulmonary disease) (H)       betamethasone valerate 0.1 % cream    VALISONE     Apply topically 2 times daily as needed (Use Sparingly)        ELIQUIS 2.5 MG tablet   Generic drug:  apixaban ANTICOAGULANT     60 tablet    TAKE ONE TABLET BY MOUTH TWICE A DAY    Atrial fibrillation, unspecified type (H)       fluticasone 50 MCG/ACT spray    FLONASE     Spray 1-2 sprays into both nostrils every evening        furosemide 20 MG tablet    LASIX    180 tablet    TAKE 1-2 TABS BY MOUTH ONCE DAILY. MAY REPEAT AFTER NOON IF NEEDED FOR WEIGHT GAIN/2+ EDEMA    Swelling       GABAPENTIN PO      Take 300 mg by mouth daily        GLUCOTROL PO      Take 5 mg by mouth every morning (before breakfast)        HYDROcodone-acetaminophen 5-325 MG per tablet    NORCO    90 tablet    Take 1 tablet by mouth every 6 hours as needed for moderate to severe pain    Intractable back pain, Scoliosis of lumbar spine, unspecified scoliosis type, Chronic midline low back pain with sciatica, sciatica laterality unspecified       hydrocortisone 2.5 % cream    ANUSOL-HC    28 g    Place rectally 2 times daily as  needed for hemorrhoids    External hemorrhoids       hydrOXYzine 25 MG capsule    VISTARIL    180 capsule    Take 1 capsule (25 mg) by mouth 2 times daily as needed for itching    Itching       lisinopril 10 MG tablet    PRINIVIL/ZESTRIL    180 tablet    Take 1 tablet (10 mg) by mouth 2 times daily    Essential hypertension       magnesium hydroxide 400 MG/5ML suspension    MILK OF MAGNESIA     Take 30 mLs by mouth At Bedtime        nystatin cream    MYCOSTATIN     Apply topically 2 times daily as needed for dry skin        ondansetron 4 MG ODT tab    ZOFRAN ODT    30 tablet    Take 1 tablet (4 mg) by mouth every 6 hours as needed for nausea    Nausea       PRESERVISION/LUTEIN Caps      Take 1 capsule by mouth daily        ranitidine 150 MG tablet    ZANTAC    180 tablet    TAKE ONE TABLET BY MOUTH TWO TIMES A DAY    Dyspepsia       simvastatin 10 MG tablet    ZOCOR    90 tablet    TAKE 1 TABLET (10 MG) BY MOUTH AT BEDTIME    Type 2 diabetes mellitus with diabetic nephropathy, without long-term current use of insulin (H)       tiotropium 18 MCG capsule    SPIRIVA     Inhale 18 mcg into the lungs every evening

## 2018-03-22 NOTE — LETTER
[facility]  [address] [facility]  [address]  [place]  [address]  Tel: [phone]    March 26, 2018    Helene Gibbs  [address]   [facility] [address]        Dear MsMaryam Gibbs,    This is to inform you regarding your test result.    Your hemoglobin is low and you have iron deficiency anemia  You keep appointment with  and you will benefit by IV iorn.  HbA1c which is average glucose during last 3 months is 6.8%.  Your diabetes is under control.  Chronic kidney disease is stable.    Sincerely,    Neisha Esparza MD/ASHLEY          Enclosure: Lab Results  Results for orders placed or performed in visit on 03/22/18   CBC with platelets   Result Value Ref Range    WBC 5.5 4.0 - 11.0 10e9/L    RBC Count 3.91 3.8 - 5.2 10e12/L    Hemoglobin 10.9 (L) 11.7 - 15.7 g/dL    Hematocrit 35.7 35.0 - 47.0 %    MCV 91 78 - 100 fl    MCH 27.9 26.5 - 33.0 pg    MCHC 30.5 (L) 31.5 - 36.5 g/dL    RDW 14.9 10.0 - 15.0 %    Platelet Count 248 150 - 450 10e9/L   Hemoglobin A1c   Result Value Ref Range    Hemoglobin A1C 6.8 (H) 4.3 - 6.0 %   Creatinine   Result Value Ref Range    Creatinine 1.37 (H) 0.52 - 1.04 mg/dL    GFR Estimate 37 (L) >60 mL/min/1.7m2    GFR Estimate If Black 45 (L) >60 mL/min/1.7m2   Ferritin   Result Value Ref Range    Ferritin 10 8 - 252 ng/mL     *Note: Due to a large number of results and/or encounters for the requested time period, some results have not been displayed. A complete set of results can be found in Results Review.

## 2018-03-22 NOTE — PROGRESS NOTES
"  SUBJECTIVE:   Helene Gibbs is a 82 year old female who presents to clinic today for the following health issues:    Medication Followup of Eliquis    Taking Medication as prescribed: yes    Side Effects:  None    Medication Helping Symptoms:  Yes    Follows up with Dr. Juarez, a cardiologist  Follows up with Dr. Cali, a hematologist  Follows up with Dr. Watson, a vascular surgeon    Hypertension  Reports BP readings have been consistently high during the last month      Problem list and histories reviewed & adjusted, as indicated.  Additional history: as documented    Medications and Labs reviewed in EPIC    Reviewed and updated as needed this visit by clinical staff  Tobacco  Allergies  Meds  Soc Hx      Reviewed and updated as needed this visit by Provider         ROS:  Constitutional, HEENT, cardiovascular, pulmonary, GI, , musculoskeletal, neuro, skin, endocrine and psych systems are negative, except as otherwise noted.    This document serves as a record of the services and decisions personally performed and made by Neisha Esparza MD. It was created on her behalf by Cassandra Holcomb, a trained medical scribe. The creation of this document is based on the provider's statements to the medical scribe.  Cassandra Holcomb 12:42 PM March 22, 2018    OBJECTIVE:     /76 (BP Location: Right arm, Patient Position: Sitting, Cuff Size: Adult Regular)  Pulse 70  Temp 97.7  F (36.5  C) (Tympanic)  Ht 1.676 m (5' 6\")  Wt 76.7 kg (169 lb)  LMP  (LMP Unknown)  SpO2 98%  Breastfeeding? No  BMI 27.28 kg/m2  Body mass index is 27.28 kg/(m^2).    GENERAL APPEARANCE: healthy, alert and no distress  EYES: Eyes grossly normal to inspection, PERRL and conjunctivae and sclerae normal  HENT: ear canals and TM's normal and nose and mouth without ulcers or lesions  NECK: no adenopathy  RESP: lungs clear to auscultation - no rales, rhonchi or wheezes  CV: regular rates and rhythm, normal S1 S2, no S3, bilateral LL " edema, worse in right LL  MS: scoliosis  PSYCH: mentation appears normal, affect normal/bright    Diagnostic Test Results:  No results found. However, due to the size of the patient record, not all encounters were searched. Please check Results Review for a complete set of results.    ASSESSMENT/PLAN:     Helene was seen today for recheck.    Diagnoses and all orders for this visit:    Lymphedema of right lower extremity  -     LYMPHEDEMA THERAPY REFERRAL  Follows up with Dr. Watson, a vascular surgeon  Reports her lymphedema isn't improving  Has been wearing compression socks  Is on gabapentin, but doesn't believe it helps and has caused weight gain and swelling.  Referred patient to lymphedema therapy  She will schedule it at her convenience    Essential hypertension  -     lisinopril (PRINIVIL/ZESTRIL) 10 MG tablet; Take 1 tablet (10 mg) by mouth 2 times daily  BP hasn't been controlled  Readings at home have been high  Increased dose of lisinopril to 10 mg twice daily  Advised patient to monitor BP and report readings higher than 140/90    Type 2 diabetes mellitus with diabetic nephropathy, without long-term current use of insulin (H)  -     Hemoglobin A1c  -     Creatinine  Doing well  Compliant with medication  Lab Results   Component Value Date    A1C 6.6 01/04/2018    A1C 6.7 08/10/2017    A1C 6.4 04/25/2017    A1C 6.2 09/27/2016    A1C 6.0 03/11/2016     Anemia, unspecified type  -     CBC with platelets  -     Ferritin    Elevated serum creatinine  -     CBC with platelets  -     Creatinine    Intractable back pain  -     HYDROcodone-acetaminophen (NORCO) 5-325 MG per tablet; Take 1 tablet by mouth every 6 hours as needed for moderate to severe pain  Reports that gapabentin isn't helping her and making lymphedema worse  I advised her to discontinue use as she also has uncontrolled lymphedema  Reports she uses tramadol occasionally for pain  Advised her to not mix tramadol and norco  Educated patient that  norco and tramadol are controlled substance. It can be habit-forming. It should be taken as prescribed. Do not mix it with alcohol. Be careful with driving. Do not lose the prescription. Do not overuse this medication.    Scoliosis of lumbar spine, unspecified scoliosis type  -     HYDROcodone-acetaminophen (NORCO) 5-325 MG per tablet; Take 1 tablet by mouth every 6 hours as needed for moderate to severe pain  See above    Chronic midline low back pain with sciatica, sciatica laterality unspecified  -     HYDROcodone-acetaminophen (NORCO) 5-325 MG per tablet; Take 1 tablet by mouth every 6 hours as needed for moderate to severe pain  See above    COPD:  Stable.    Atrial fibrillation:  HR under control on eliquis  Follows up with Dr. Cali, a hematologist  Advised her to discuss bumping up of Eliquis dose  I will contact him as well  Due to patient's age, and history of GI bleeding and elevated creatinine I am in favor of lower dose  She is currently taking 2.5 mg bid        Patient Instructions   Labs today  I refilled your prescriptions  Take lisinopril 10 mg twice daily  Monitor your blood pressure once a week at home.  Bring those readings on your next visit.  Notify us if your blood pressure readings consistently stays greater than 140/90.  Keep upcoming appointment with Dr. Cali  Speak with Dr. Cali about doses for Eliquis  Schedule an appointment for lymphedema therapy at your earliest convenience  Follow up in 2 months.  Seek sooner medical attention if there is any worsening of symptoms or problems    The total visit time was 40 minutes more than 50% was spent in counseling and coordination of care as discussed above.    The information in this document, created by the medical scribe for me, accurately reflects the services I personally performed and the decisions made by me. I have reviewed and approved this document for accuracy prior to leaving the patient care area.  March 22, 2018 1:16 PM    Neisha  ANITA Esparza MD  [facility]

## 2018-03-22 NOTE — PATIENT INSTRUCTIONS
Labs today  I refilled your prescriptions  Take lisinopril 10 mg twice daily  Monitor your blood pressure once a week at home.  Bring those readings on your next visit.  Notify us if your blood pressure readings consistently stays greater than 140/90.  Keep upcoming appointment with Dr. Cali  Speak with Dr. Cali about doses for Eliquis  Schedule an appointment for lymphedema therapy at your earliest convenience  Follow up in 2 months.  Seek sooner medical attention if there is any worsening of symptoms or problems

## 2018-03-23 LAB
CREAT SERPL-MCNC: 1.37 MG/DL (ref 0.52–1.04)
FERRITIN SERPL-MCNC: 10 NG/ML (ref 8–252)
GFR SERPL CREATININE-BSD FRML MDRD: 37 ML/MIN/1.7M2

## 2018-03-25 NOTE — PROGRESS NOTES
Please notify patient by sending following letter with copy of test results      Shakira Narayan,    This is to inform you regarding your test result.    Your hemoglobin is low and you have iron deficiency anemia  You keep appointment with  and you will benefit by IV iorn.  HbA1c which is average glucose during last 3 months is 6.8%.  Your diabetes is under control.  Chronic kidney disease is stable.      Sincerely,      Dr.Nasima Vilma MD,FACP

## 2018-04-06 ENCOUNTER — APPOINTMENT (OUTPATIENT)
Dept: CT IMAGING | Facility: CLINIC | Age: 83
End: 2018-04-06
Attending: EMERGENCY MEDICINE
Payer: MEDICARE

## 2018-04-06 ENCOUNTER — HOSPITAL ENCOUNTER (EMERGENCY)
Facility: CLINIC | Age: 83
Discharge: HOME | End: 2018-04-06
Attending: EMERGENCY MEDICINE | Admitting: EMERGENCY MEDICINE
Payer: MEDICARE

## 2018-04-06 VITALS
RESPIRATION RATE: 16 BRPM | DIASTOLIC BLOOD PRESSURE: 79 MMHG | BODY MASS INDEX: 27.32 KG/M2 | WEIGHT: 170 LBS | TEMPERATURE: 97.8 F | OXYGEN SATURATION: 98 % | HEIGHT: 66 IN | SYSTOLIC BLOOD PRESSURE: 121 MMHG | HEART RATE: 76 BPM

## 2018-04-06 DIAGNOSIS — R91.8 PULMONARY NODULES: ICD-10-CM

## 2018-04-06 DIAGNOSIS — R10.9 FLANK PAIN: ICD-10-CM

## 2018-04-06 DIAGNOSIS — D64.9 ANEMIA, UNSPECIFIED TYPE: ICD-10-CM

## 2018-04-06 LAB
ALBUMIN SERPL-MCNC: 3.4 G/DL (ref 3.4–5)
ALBUMIN UR-MCNC: NEGATIVE MG/DL
ALP SERPL-CCNC: 64 U/L (ref 40–150)
ALT SERPL W P-5'-P-CCNC: 14 U/L (ref 0–50)
AMORPH CRY #/AREA URNS HPF: ABNORMAL /HPF
ANION GAP SERPL CALCULATED.3IONS-SCNC: 6 MMOL/L (ref 3–14)
APPEARANCE UR: ABNORMAL
AST SERPL W P-5'-P-CCNC: 14 U/L (ref 0–45)
BACTERIA #/AREA URNS HPF: ABNORMAL /HPF
BASOPHILS # BLD AUTO: 0 10E9/L (ref 0–0.2)
BASOPHILS NFR BLD AUTO: 0.8 %
BILIRUB SERPL-MCNC: 0.4 MG/DL (ref 0.2–1.3)
BILIRUB UR QL STRIP: NEGATIVE
BUN SERPL-MCNC: 22 MG/DL (ref 7–30)
CALCIUM SERPL-MCNC: 8.7 MG/DL (ref 8.5–10.1)
CHLORIDE SERPL-SCNC: 105 MMOL/L (ref 94–109)
CO2 SERPL-SCNC: 26 MMOL/L (ref 20–32)
COLOR UR AUTO: ABNORMAL
CREAT SERPL-MCNC: 1.29 MG/DL (ref 0.52–1.04)
D DIMER PPP FEU-MCNC: 0.6 UG/ML FEU (ref 0–0.5)
DIFFERENTIAL METHOD BLD: ABNORMAL
EOSINOPHIL # BLD AUTO: 0.2 10E9/L (ref 0–0.7)
EOSINOPHIL NFR BLD AUTO: 3.3 %
ERYTHROCYTE [DISTWIDTH] IN BLOOD BY AUTOMATED COUNT: 15.1 % (ref 10–15)
GFR SERPL CREATININE-BSD FRML MDRD: 40 ML/MIN/1.7M2
GLUCOSE BLDC GLUCOMTR-MCNC: 152 MG/DL (ref 70–99)
GLUCOSE SERPL-MCNC: 133 MG/DL (ref 70–99)
GLUCOSE UR STRIP-MCNC: NEGATIVE MG/DL
HCT VFR BLD AUTO: 35.6 % (ref 35–47)
HGB BLD-MCNC: 10.7 G/DL (ref 11.7–15.7)
HGB UR QL STRIP: NEGATIVE
HYALINE CASTS #/AREA URNS LPF: 6 /LPF (ref 0–2)
IMM GRANULOCYTES # BLD: 0 10E9/L (ref 0–0.4)
IMM GRANULOCYTES NFR BLD: 0 %
INTERPRETATION ECG - MUSE: NORMAL
KETONES UR STRIP-MCNC: NEGATIVE MG/DL
LACTATE BLD-SCNC: 1.3 MMOL/L (ref 0.7–2)
LEUKOCYTE ESTERASE UR QL STRIP: NEGATIVE
LIPASE SERPL-CCNC: 112 U/L (ref 73–393)
LYMPHOCYTES # BLD AUTO: 1.1 10E9/L (ref 0.8–5.3)
LYMPHOCYTES NFR BLD AUTO: 20.1 %
MCH RBC QN AUTO: 26.3 PG (ref 26.5–33)
MCHC RBC AUTO-ENTMCNC: 30.1 G/DL (ref 31.5–36.5)
MCV RBC AUTO: 88 FL (ref 78–100)
MONOCYTES # BLD AUTO: 0.6 10E9/L (ref 0–1.3)
MONOCYTES NFR BLD AUTO: 11.9 %
NEUTROPHILS # BLD AUTO: 3.3 10E9/L (ref 1.6–8.3)
NEUTROPHILS NFR BLD AUTO: 63.9 %
NITRATE UR QL: NEGATIVE
NRBC # BLD AUTO: 0 10*3/UL
NRBC BLD AUTO-RTO: 0 /100
PH UR STRIP: 8 PH (ref 5–7)
PLATELET # BLD AUTO: 217 10E9/L (ref 150–450)
POTASSIUM SERPL-SCNC: 4.5 MMOL/L (ref 3.4–5.3)
PROT SERPL-MCNC: 7 G/DL (ref 6.8–8.8)
RBC # BLD AUTO: 4.07 10E12/L (ref 3.8–5.2)
RBC #/AREA URNS AUTO: 2 /HPF (ref 0–2)
SODIUM SERPL-SCNC: 137 MMOL/L (ref 133–144)
SOURCE: ABNORMAL
SP GR UR STRIP: 1.01 (ref 1–1.03)
SQUAMOUS #/AREA URNS AUTO: 15 /HPF (ref 0–1)
TROPONIN I SERPL-MCNC: <0.015 UG/L (ref 0–0.04)
UROBILINOGEN UR STRIP-MCNC: NORMAL MG/DL (ref 0–2)
WBC # BLD AUTO: 5.2 10E9/L (ref 4–11)
WBC #/AREA URNS AUTO: 6 /HPF (ref 0–5)
YEAST #/AREA URNS HPF: ABNORMAL /HPF

## 2018-04-06 PROCEDURE — 99285 EMERGENCY DEPT VISIT HI MDM: CPT | Mod: 25

## 2018-04-06 PROCEDURE — 83690 ASSAY OF LIPASE: CPT | Performed by: EMERGENCY MEDICINE

## 2018-04-06 PROCEDURE — 74177 CT ABD & PELVIS W/CONTRAST: CPT

## 2018-04-06 PROCEDURE — 80053 COMPREHEN METABOLIC PANEL: CPT | Performed by: EMERGENCY MEDICINE

## 2018-04-06 PROCEDURE — 96374 THER/PROPH/DIAG INJ IV PUSH: CPT | Mod: 59

## 2018-04-06 PROCEDURE — 25000132 ZZH RX MED GY IP 250 OP 250 PS 637: Mod: GY | Performed by: EMERGENCY MEDICINE

## 2018-04-06 PROCEDURE — 25000128 H RX IP 250 OP 636: Performed by: EMERGENCY MEDICINE

## 2018-04-06 PROCEDURE — 83605 ASSAY OF LACTIC ACID: CPT | Performed by: EMERGENCY MEDICINE

## 2018-04-06 PROCEDURE — 00000146 ZZHCL STATISTIC GLUCOSE BY METER IP

## 2018-04-06 PROCEDURE — 85379 FIBRIN DEGRADATION QUANT: CPT | Performed by: EMERGENCY MEDICINE

## 2018-04-06 PROCEDURE — 81001 URINALYSIS AUTO W/SCOPE: CPT | Performed by: EMERGENCY MEDICINE

## 2018-04-06 PROCEDURE — 84484 ASSAY OF TROPONIN QUANT: CPT | Performed by: EMERGENCY MEDICINE

## 2018-04-06 PROCEDURE — 85025 COMPLETE CBC W/AUTO DIFF WBC: CPT | Performed by: EMERGENCY MEDICINE

## 2018-04-06 PROCEDURE — 96375 TX/PRO/DX INJ NEW DRUG ADDON: CPT

## 2018-04-06 PROCEDURE — 25000125 ZZHC RX 250: Performed by: EMERGENCY MEDICINE

## 2018-04-06 PROCEDURE — 87086 URINE CULTURE/COLONY COUNT: CPT | Performed by: EMERGENCY MEDICINE

## 2018-04-06 PROCEDURE — 93005 ELECTROCARDIOGRAM TRACING: CPT

## 2018-04-06 RX ORDER — LIDOCAINE 50 MG/G
1 PATCH TOPICAL EVERY 24 HOURS
Qty: 10 PATCH | Refills: 0 | Status: SHIPPED | OUTPATIENT
Start: 2018-04-06 | End: 2019-01-01

## 2018-04-06 RX ORDER — LIDOCAINE 4 G/G
1 PATCH TOPICAL ONCE
Status: DISCONTINUED | OUTPATIENT
Start: 2018-04-06 | End: 2018-04-06 | Stop reason: HOSPADM

## 2018-04-06 RX ORDER — MORPHINE SULFATE 4 MG/ML
2 INJECTION, SOLUTION INTRAMUSCULAR; INTRAVENOUS
Status: COMPLETED | OUTPATIENT
Start: 2018-04-06 | End: 2018-04-06

## 2018-04-06 RX ORDER — SODIUM CHLORIDE 9 MG/ML
INJECTION, SOLUTION INTRAVENOUS ONCE
Status: DISCONTINUED | OUTPATIENT
Start: 2018-04-06 | End: 2018-04-06 | Stop reason: HOSPADM

## 2018-04-06 RX ORDER — IOPAMIDOL 755 MG/ML
85 INJECTION, SOLUTION INTRAVASCULAR ONCE
Status: COMPLETED | OUTPATIENT
Start: 2018-04-06 | End: 2018-04-06

## 2018-04-06 RX ADMIN — MORPHINE SULFATE 2 MG: 4 INJECTION INTRAVENOUS at 08:48

## 2018-04-06 RX ADMIN — IOPAMIDOL 85 ML: 755 INJECTION, SOLUTION INTRAVENOUS at 09:43

## 2018-04-06 RX ADMIN — SODIUM CHLORIDE 66 ML: 9 INJECTION, SOLUTION INTRAVENOUS at 09:43

## 2018-04-06 RX ADMIN — LIDOCAINE 1 PATCH: 560 PATCH PERCUTANEOUS; TOPICAL; TRANSDERMAL at 08:48

## 2018-04-06 RX ADMIN — FAMOTIDINE 20 MG: 10 INJECTION, SOLUTION INTRAVENOUS at 07:11

## 2018-04-06 ASSESSMENT — ENCOUNTER SYMPTOMS
ABDOMINAL PAIN: 1
FEVER: 0
NAUSEA: 0
HEMATURIA: 0
VOMITING: 0
DYSURIA: 0
BLOOD IN STOOL: 0
DIARRHEA: 0

## 2018-04-06 NOTE — ED AVS SNAPSHOT
Emergency Department    [address] [place] [address]    Phone:  [phone]    Fax:  [phone]                                       Helene Gibbs   MRN: [id number]    Department:   Emergency Department   Date of Visit:  4/6/2018           After Visit Summary Signature Page     I have received my discharge instructions, and my questions have been answered. I have discussed any challenges I see with this plan with the nurse or doctor.    ..........................................................................................................................................  Patient/Patient Representative Signature      ..........................................................................................................................................  Patient Representative Print Name and Relationship to Patient    ..................................................               ................................................  Date                                            Time    ..........................................................................................................................................  Reviewed by Signature/Title    ...................................................              ..............................................  Date                                                            Time

## 2018-04-06 NOTE — ED NOTES
"Patient presents to ED with reports of right sided flank/abdominal pain that began Wednesday morning and has gotten worse. Patient reports she had an appt this afternoon with her NP but the pain was too bad she could not wait. Patient denies any n/v/d or consitpation. Patient denies any injury to area. Pt denies any chest pain or SOB. Pt states \" it feels like I pulled a muscle in that area.\" VSS. Will continue to monitor. Patient given water bottle for water prep for possible CT per Dr. Bishop. Call light in reach.   "

## 2018-04-06 NOTE — ED AVS SNAPSHOT
Emergency Department    [address] [facility] [address]    Phone:  [phone]    Fax:  [phone]                                       Helene Gibbs   MRN: [id number]    Department:   Emergency Department   Date of Visit:  4/6/2018           Patient Information     Date Of Birth          1935        Your diagnoses for this visit were:     Flank pain     Pulmonary nodules     Anemia, unspecified type        You were seen by Duran Bishop MD.      Follow-up Information     Follow up with Neisha Esparza MD In 1 week.    Specialty:  Internal Medicine    Contact information:    [address] [facility] [place]  [facility] [address] [phone]          Follow up with  Emergency Department.    Specialty:  EMERGENCY MEDICINE    Why:  As needed, If symptoms worsen    Contact information:    [address] [facility] [address] [phone]        Discharge Instructions         Flank Pain, Uncertain Cause  The flank is the area between your upper abdomen and your back. Pain there is often caused by a problem with your kidneys. It might be a kidney infection or a kidney stone. Other causes of flank pain include spinal arthritis, a pinched nerve from a back injury, or a back muscle strain or spasm.  The cause of your flank pain is not certain. You may need other tests.  Home care  Follow these tips when caring for yourself at home:    You may use acetaminophen or ibuprofen to control pain, unless your health care provider prescribed another medicine. If you have chronic liver or kidney disease, talk with your provider before taking these medicines. Also talk with your provider first if you ve ever had a stomach ulcer or GI bleeding.    If the pain is coming from your muscles, you may get relief with ice or heat. During the first 2 days after the injury, put an ice pack on the painful area for 20 minutes every 2 to 4 hours. This will reduce swelling and  pain. A hot shower, hot bath, or heating pad works well for a muscle spasm. You can start with ice, then switch to heat after 2 days. You might find that alternating ice and heat works well. Use the method that feels the best to you.  Follow-up care  Follow up with your healthcare provider if your symptoms don t get better over the next few days.  When to seek medical advice  Call your healthcare provider right away if any of these happen:    Repeated vomiting    Fever of 100.4 F (38 C) or higher, or as directed by your health care provider    Flank pain that gets worse    Pain that spreads to the front of your belly (abdomen)    Dizziness, weakness, or fainting    Blood in your urine    Burning feeling when you urinate or the need to urinate often    Pain in one of your legs that gets worse    Numbness or weakness in a leg  Date Last Reviewed: 10/1/2016    8306-7438 [employer]. [address] [place] [address]. All rights reserved. This information is not intended as a substitute for professional medical care. Always follow your healthcare professional's instructions.    Discharge Instructions  Incidental Findings  Pulmonary Nodule, Repeat CT per primary provider  An incidental finding is something unexpected that was found while you were being treated and is felt to not be related to the reason that you came to the Emergency Department.  While this finding is not an emergency, you need to follow up with your primary provider (or occasionally a specialist) to determine if anything should be done about it.    These findings can come from:    Checking your vital signs (example: high blood pressure).    Taking your history (example: unexplained weight loss).    The physical exam (example: a heart murmur).    Laboratory study (example: anemia or low blood count).    X-rays/ultrasound/CT or other imaging (example: an unexplained mass).    Generally, every Emergency Department visit should have a  follow-up clinic visit with either a primary or a specialty clinic/provider. Please follow-up as instructed by your emergency provider today.    Return to the Emergency Department if:    Your condition worsens.    You develop unexpected pain.    You now develop new symptoms or have new concerns.  If you were given a prescription for medicine here today, be sure to read all of the information (including the package insert) that comes with your prescription.  This will include important information about the medicine, its side effects, and any warnings that you need to know about.  The pharmacist who fills the prescription can provide more information and answer questions you may have about the medicine.  If you have questions or concerns that the pharmacist cannot address, please call or return to the Emergency Department.   Remember that you can always come back to the Emergency Department if you are not able to see your regular provider in the amount of time listed above, if you get any new symptoms, or if there is anything that worries you.      Your next 10 appointments already scheduled     Apr 16, 2018 12:15 PM CDT   Lymphedema Evaluation with Carmen Lucas OT   [facility] Lymphedema OT ([facility])    [address]  [address]  [facility] [address]   [phone]            Apr 17, 2018  1:00 PM CDT   Return Visit with  Oncology Nurse   [facility] ([facility])    [facility]  [address]  [facility] [address]   [phone]            Apr 17, 2018  2:00 PM CDT   Return Visit with Estiven Cali MD   [facility] ([facility])    [facility]  [address]  [facility] [address]   [phone]            May 01, 2018  1:15 PM CDT   Teletrace with STONE TECH1   [facility]   [place] ([facility])    [address] [facility] [address]  [place]  [address]   [phone] OPT 2            May 04, 2018 11:00 AM CDT   US EMIGDIO DOPPLER NO EXERCISE 1-2 LVLS BILAT with SHVUS1   [facility] ([facility])    [address]  [place] [address]   [phone]           Please bring a list of your medicines (including vitamins, minerals and over-the-counter drugs). Also, tell your doctor about any allergies you may have. Wear comfortable clothes and leave your valuables at home.  No caffeine or tobacco for 1 hour prior to exam.  Please call the Imaging Department at your exam site with any questions.            May 04, 2018 11:30 AM CDT   Return Visit with Pieter Watson MD   [facility] ([facility])    [address]  [place] [address] [phone]              24 Hour Appointment Hotline       To make an appointment at any [facility] clinic, call [phone] ([phone]). If you don't have a family doctor or clinic, we will help you find one. [facility] clinics are conveniently located to serve the needs of you and your family.             Review of your medicines      START taking        Dose / Directions Last dose taken    lidocaine 5 % Patch   Commonly known as:  LIDODERM   Dose:  1 patch   Quantity:  10 patch        Place 1 patch onto the skin every 24 hours for 10 days   Refills:  0          Our records show that you are taking the medicines listed below. If these are incorrect, please call your family doctor or clinic.        Dose / Directions Last dose taken    albuterol 108 (90 BASE) MCG/ACT Inhaler   Commonly known as:  PROAIR HFA/PROVENTIL HFA/VENTOLIN HFA   Dose:  2 puff   Quantity:  1 Inhaler        Inhale 2 puffs into the lungs every 4 hours as needed for shortness of breath / dyspnea or wheezing   Refills:  5        betamethasone valerate 0.1 % cream   Commonly known as:  VALISONE         Apply topically 2 times daily as needed (Use Sparingly)   Refills:  0        ELIQUIS 2.5 MG tablet   Quantity:  60 tablet   Generic drug:  apixaban ANTICOAGULANT        TAKE ONE TABLET BY MOUTH TWICE A DAY   Refills:  0        fluticasone 50 MCG/ACT spray   Commonly known as:  FLONASE   Dose:  1-2 spray        Spray 1-2 sprays into both nostrils every evening   Refills:  0        furosemide 20 MG tablet   Commonly known as:  LASIX   Quantity:  180 tablet        TAKE 1-2 TABS BY MOUTH ONCE DAILY. MAY REPEAT AFTER NOON IF NEEDED FOR WEIGHT GAIN/2+ EDEMA   Refills:  1        GABAPENTIN PO   Dose:  300 mg        Take 300 mg by mouth daily   Refills:  0        GLUCOTROL PO   Dose:  5 mg        Take 5 mg by mouth every morning (before breakfast)   Refills:  0        HYDROcodone-acetaminophen 5-325 MG per tablet   Commonly known as:  NORCO   Dose:  1 tablet   Quantity:  90 tablet        Take 1 tablet by mouth every 6 hours as needed for moderate to severe pain   Refills:  0        hydrocortisone 2.5 % cream   Commonly known as:  ANUSOL-HC   Quantity:  28 g        Place rectally 2 times daily as needed for hemorrhoids   Refills:  1        hydrOXYzine 25 MG capsule   Commonly known as:  VISTARIL   Dose:  25 mg   Quantity:  180 capsule        Take 1 capsule (25 mg) by mouth 2 times daily as needed for itching   Refills:  1        lisinopril 10 MG tablet   Commonly known as:  PRINIVIL/ZESTRIL   Dose:  10 mg   Quantity:  180 tablet        Take 1 tablet (10 mg) by mouth 2 times daily   Refills:  1        magnesium hydroxide 400 MG/5ML suspension   Commonly known as:  MILK OF MAGNESIA   Dose:  30 mL        Take 30 mLs by mouth At Bedtime   Refills:  0        nystatin cream   Commonly known as:  MYCOSTATIN        Apply topically 2 times daily as needed for dry skin   Refills:  0        ondansetron 4 MG ODT tab   Commonly known as:  ZOFRAN ODT   Dose:  4 mg   Quantity:  30 tablet        Take 1 tablet (4 mg) by mouth every 6 hours  as needed for nausea   Refills:  1        PRESERVISION/LUTEIN Caps   Dose:  1 capsule        Take 1 capsule by mouth daily   Refills:  0        ranitidine 150 MG tablet   Commonly known as:  ZANTAC   Quantity:  180 tablet        TAKE ONE TABLET BY MOUTH TWO TIMES A DAY   Refills:  3        simvastatin 10 MG tablet   Commonly known as:  ZOCOR   Quantity:  90 tablet        TAKE 1 TABLET (10 MG) BY MOUTH AT BEDTIME   Refills:  2        tiotropium 18 MCG capsule   Commonly known as:  SPIRIVA   Dose:  18 mcg        Inhale 18 mcg into the lungs every evening   Refills:  0                Prescriptions were sent or printed at these locations (1 Prescription)                   Other Prescriptions                Printed at Department/Unit printer (1 of 1)         lidocaine (LIDODERM) 5 % Patch                Procedures and tests performed during your visit     CBC with platelets differential    CT Chest/Abdomen/Pelvis w Contrast    Comprehensive metabolic panel    D dimer quantitative    EKG 12 lead    EKG 12-lead, tracing only    Glucose by meter    Lactic acid whole blood    Lipase    Troponin I    UA with Microscopic    Urine Culture      Orders Needing Specimen Collection     None      Pending Results     Date and Time Order Name Status Description    4/6/2018 0913 Urine Culture In process             Pending Culture Results     Date and Time Order Name Status Description    4/6/2018 0913 Urine Culture In process             Pending Results Instructions     If you had any lab results that were not finalized at the time of your Discharge, you can call the ED Lab Result RN at [phone]. You will be contacted by this team for any positive Lab results or changes in treatment. The nurses are available 7 days a week from 10A to 6:30P.  You can leave a message 24 hours per day and they will return your call.        Test Results From Your Hospital Stay        4/6/2018  7:49 AM      Component Results     Component Value Ref  Range & Units Status    WBC 5.2 4.0 - 11.0 10e9/L Final    RBC Count 4.07 3.8 - 5.2 10e12/L Final    Hemoglobin 10.7 (L) 11.7 - 15.7 g/dL Final    Hematocrit 35.6 35.0 - 47.0 % Final    MCV 88 78 - 100 fl Final    MCH 26.3 (L) 26.5 - 33.0 pg Final    MCHC 30.1 (L) 31.5 - 36.5 g/dL Final    RDW 15.1 (H) 10.0 - 15.0 % Final    Platelet Count 217 150 - 450 10e9/L Final    Diff Method Automated Method  Final    % Neutrophils 63.9 % Final    % Lymphocytes 20.1 % Final    % Monocytes 11.9 % Final    % Eosinophils 3.3 % Final    % Basophils 0.8 % Final    % Immature Granulocytes 0.0 % Final    Nucleated RBCs 0 0 /100 Final    Absolute Neutrophil 3.3 1.6 - 8.3 10e9/L Final    Absolute Lymphocytes 1.1 0.8 - 5.3 10e9/L Final    Absolute Monocytes 0.6 0.0 - 1.3 10e9/L Final    Absolute Eosinophils 0.2 0.0 - 0.7 10e9/L Final    Absolute Basophils 0.0 0.0 - 0.2 10e9/L Final    Abs Immature Granulocytes 0.0 0 - 0.4 10e9/L Final    Absolute Nucleated RBC 0.0  Final         4/6/2018  8:31 AM      Component Results     Component Value Ref Range & Units Status    Sodium 137 133 - 144 mmol/L Final    Potassium 4.5 3.4 - 5.3 mmol/L Final    Chloride 105 94 - 109 mmol/L Final    Carbon Dioxide 26 20 - 32 mmol/L Final    Anion Gap 6 3 - 14 mmol/L Final    Glucose 133 (H) 70 - 99 mg/dL Final    Urea Nitrogen 22 7 - 30 mg/dL Final    Creatinine 1.29 (H) 0.52 - 1.04 mg/dL Final    GFR Estimate 40 (L) >60 mL/min/1.7m2 Final    Non  GFR Calc    GFR Estimate If Black 48 (L) >60 mL/min/1.7m2 Final    African American GFR Calc    Calcium 8.7 8.5 - 10.1 mg/dL Final    Bilirubin Total 0.4 0.2 - 1.3 mg/dL Final    Albumin 3.4 3.4 - 5.0 g/dL Final    Protein Total 7.0 6.8 - 8.8 g/dL Final    Alkaline Phosphatase 64 40 - 150 U/L Final    ALT 14 0 - 50 U/L Final    AST 14 0 - 45 U/L Final         4/6/2018  8:10 AM      Component Results     Component Value Ref Range & Units Status    Lipase 112 73 - 393 U/L Final         4/6/2018  8:31  AM      Component Results     Component Value Ref Range & Units Status    Troponin I ES <0.015 0.000 - 0.045 ug/L Final    The 99th percentile for upper reference range is 0.045 ug/L.  Troponin values   in the range of 0.045 - 0.120 ug/L may be associated with risks of adverse   clinical events.           4/6/2018  7:50 AM      Component Results     Component Value Ref Range & Units Status    Lactic Acid 1.3 0.7 - 2.0 mmol/L Final         4/6/2018  8:55 AM      Component Results     Component Value Ref Range & Units Status    Color Urine Light Yellow  Final    Appearance Urine Slightly Cloudy  Final    Glucose Urine Negative NEG^Negative mg/dL Final    Bilirubin Urine Negative NEG^Negative Final    Ketones Urine Negative NEG^Negative mg/dL Final    Specific Gravity Urine 1.009 1.003 - 1.035 Final    Blood Urine Negative NEG^Negative Final    pH Urine 8.0 (H) 5.0 - 7.0 pH Final    Protein Albumin Urine Negative NEG^Negative mg/dL Final    Urobilinogen mg/dL Normal 0.0 - 2.0 mg/dL Final    Nitrite Urine Negative NEG^Negative Final    Leukocyte Esterase Urine Negative NEG^Negative Final    Source Midstream Urine  Final    WBC Urine 6 (H) 0 - 5 /HPF Final    RBC Urine 2 0 - 2 /HPF Final    Bacteria Urine Few (A) NEG^Negative /HPF Final    Yeast Urine Few (A) NEG^Negative /HPF Final    Squamous Epithelial /HPF Urine 15 (H) 0 - 1 /HPF Final    Hyaline Casts 6 (H) 0 - 2 /LPF Final    Amorphous Crystals Few (A) NEG^Negative /HPF Final         4/6/2018  8:05 AM      Component Results     Component Value Ref Range & Units Status    D Dimer 0.6 (H) 0.0 - 0.50 ug/ml FEU Final    This D-dimer assay is intended for use in conjunction with a clinical pretest   probability assessment model to exclude pulmonary embolism (PE) and deep   venous thrombosis (DVT) in outpatients suspected of PE or DVT. The cut-off   value is 0.5 ug/mL FEU.           4/6/2018 10:21 AM      Narrative     CT CHEST, ABDOMEN, AND PELVIS WITH CONTRAST   4/6/2018 9:53 AM    HISTORY: Right flank/pleuritic pain, elevated D-dimer, evaluate for  pulmonary embolus, hydronephrosis.     COMPARISON: A CT of the abdomen and pelvis without contrast on  9/29/2017.    TECHNIQUE: Routine transverse CT imaging of the chest, abdomen, and  pelvis was performed following the uneventful administration of 85mL  Isovue-370 intravenous contrast. Radiation dose for this scan was  reduced using automated exposure control, adjustment of the mA and/or  kV according to patient size, or iterative reconstruction technique.    FINDINGS:     Chest: The heart size is mildly enlarged. No enlarged lymph node or  other abnormal mediastinal mass is seen. There is calcification within  the thoracic aorta and coronary arteries. There is very good  opacification of the pulmonary arteries with contrast. No embolism is  seen. There is a 1.4 cm area of nodular density in the right upper  lobe on series 6 image 50. There is mild atelectasis or infiltrate of  the posterior left lung base. The lungs are otherwise clear. No  pneumothorax is demonstrated. No pleural effusion is identified. There  are degenerative changes of the spine. There is a left chest wall  surgical device.     Abdomen and pelvis: The liver, spleen, and pancreas are normal. There  has been a cholecystectomy. No adrenal gland pathology is seen. Again  seen are several cysts in both kidneys. No urinary tract calculus is  seen with no hydronephrosis demonstrated. The bladder is unremarkable.  No enlarged lymph node or other abnormal mass is demonstrated. No free  fluid is seen. No free intraperitoneal gas is identified. The  gastrointestinal tract is unremarkable. There is a normal-appearing  appendix. There is calcification in the vascular structures. There are  degenerative changes in the spine and hips. No other osseous  abnormality is seen. No abdominal or pelvic wall pathology is  demonstrated.         Impression     IMPRESSION:   1. No  acute abnormality is demonstrated. Specifically, there is no  evidence of pulmonary embolism or urinary tract obstruction.  2. There is a 1.4 cm area of nodular density in the right upper lobe  of the lung. This may represent a small focus of round atelectasis or  infiltrate. A follow-up CT should be considered to ensure resolution  or stability.    SAMANTHA BANUELOS MD         4/6/2018 10:27 AM         4/6/2018 10:13 AM      Component Results     Component Value Ref Range & Units Status    Glucose 152 (H) 70 - 99 mg/dL Final                Clinical Quality Measure: Blood Pressure Screening     Your blood pressure was checked while you were in the emergency department today. The last reading we obtained was  BP: 121/79 . Please read the guidelines below about what these numbers mean and what you should do about them.  If your systolic blood pressure (the top number) is less than 120 and your diastolic blood pressure (the bottom number) is less than 80, then your blood pressure is normal. There is nothing more that you need to do about it.  If your systolic blood pressure (the top number) is 120-139 or your diastolic blood pressure (the bottom number) is 80-89, your blood pressure may be higher than it should be. You should have your blood pressure rechecked within a year by a primary care provider.  If your systolic blood pressure (the top number) is 140 or greater or your diastolic blood pressure (the bottom number) is 90 or greater, you may have high blood pressure. High blood pressure is treatable, but if left untreated over time it can put you at risk for heart attack, stroke, or kidney failure. You should have your blood pressure rechecked by a primary care provider within the next 4 weeks.  If your provider in the emergency department today gave you specific instructions to follow-up with your doctor or provider even sooner than that, you should follow that instruction and not wait for up to 4 weeks for your  "follow-up visit.        Thank you for choosing [facility]       Thank you for choosing [facility] for your care. Our goal is always to provide you with excellent care. Hearing back from our patients is one way we can continue to improve our services. Please take a few minutes to complete the written survey that you may receive in the mail after you visit with us. Thank you!        [facility] Information     [employer] lets you send messages to your doctor, view your test results, renew your prescriptions, schedule appointments and more. To sign up, go to [email or web address] . Click on \"Log in\" on the left side of the screen, which will take you to the Welcome page. Then click on \"Sign up Now\" on the right side of the page.     You will be asked to enter the access code listed below, as well as some personal information. Please follow the directions to create your username and password.     Your access code is: [email or web address]  Expires: 2018  6:15 PM     Your access code will  in 90 days. If you need help or a new code, please call your [facility] clinic or [phone].        Care EveryWhere ID     This is your Care EveryWhere ID. This could be used by other organizations to access your [facility] medical records  [id number]        Equal Access to Services     TONG TUCKER : Yariel sarabia Somariia, waaxda luqadaha, qaybta kaalmada adeegyada, pamela nolasco. So [facility] [phone].    ATENCIÓN: Si habla español, tiene a gupta disposición servicios gratuitos de asistencia lingüística. Llame al [phone].    We comply with applicable federal civil rights laws and [place] laws. We do not discriminate on the basis of race, color, national origin, age, disability, sex, sexual orientation, or gender identity.            After Visit Summary       This is your record. Keep this with you and show to your community pharmacist(s) and doctor(s) at your next visit.                  "

## 2018-04-06 NOTE — ED PROVIDER NOTES
History     Chief Complaint:  Abdominal Pain     HPI   Helene Gibbs is a 82 year old female with history of hypertension, diabetes, hyperlipidemia, COPD, CHF, CAD, pulmonary hypertension, atrial fibrillation, cholecystectomy, among others, who has a pacemaker placed and is anticoagulated on Eliquis for her atrial fibrillation. The patient presents to the emergency department today for evaluation of right-sided abdominal pain that began 2 days ago without recent trauma or falls. The patient reports sudden onset right-sided abdominal/rib pain after she got out of a reclining chair at home. She localizes her pain directly under her right breast and thinks she may have pulled a muscle. The patient states she has never had this happen before. She states her pain is made worse with movement and there is no pain when she is sitting still. She also notes some pleuritic pain with deep breathing only. The patient scheduled an appointment with her primary care for today, but her pain was worse this morning and she thought she would not be able to get dressed for the appointment, so EMS was called and the patient was brought to the ED for evaluation. EMS report her blood glucose as normal. The patient reports having a normal bowel movement this morning and denies seeing any blood in her stools. She denies having a history of blood clots or any family history of the same. The patient denies nausea/vomiting, fever, dysuria, or hematuria.     Allergies:  Ambien [Zolpidem Tartrate] - visual disturbance  Penicillins - hives  Definity - syncope  Sulfa Drugs - itching  Cymbalta -rash  Fluconazole - rash     Medications:    Lisinopril   Norco    Zantac   Eliquis   Simvastatin   Gabapentin   Glipizide  Spiriva  Nystatin  Flonase  Lasix  Hydroxyzine  Albuterol    Past Medical History:    Anemia   Arthritis   Bell's palsy   Breast cancer   Colon polyps  CHF  COPD  CAD  Type II diabetes mellitus  "  GERD  Hyperlipidemia  Hypertension  Lumbar spinal stenosis   Obesity   Osteoporosis   Chronic pain  Pacemaker   Permanent atrial fibrillation   Rectal stenosis   Pulmonary hypertension   Tricuspid regurgitation     Past Surgical History:    Bilateral shoulder arthroscopy   Bone marrow biopsy  EGD combined  AV node ablation  Pacemaker implant  Bilateral knee replacement  Cholecystectomy   Left breast lumpectomy   Corneal transplant bilaterally     Family History:    Hypertension   Diabetes   CAD    Social History:  The patient was accompanied to the ED by EMS.  Smoking Status: current some day  Alcohol Use: No   Marital Status:   [5]     Review of Systems   Constitutional: Negative for fever.   Gastrointestinal: Positive for abdominal pain (R). Negative for blood in stool, diarrhea, nausea and vomiting.   Genitourinary: Negative for dysuria and hematuria.   All other systems reviewed and are negative.    Physical Exam     Patient Vitals for the past 24 hrs:   BP Temp Temp src Pulse Heart Rate Resp SpO2 Height Weight   04/06/18 1135 - - - 76 - 16 - - -   04/06/18 1000 - - - - - - 98 % - -   04/06/18 0900 - - - - 73 24 97 % - -   04/06/18 0830 - - - - 73 12 - - -   04/06/18 0703 121/79 97.8  F (36.6  C) Oral 70 - 25 96 % 1.676 m (5' 6\") 77.1 kg (170 lb)   04/06/18 0700 121/79 - - - - - - - -      Physical Exam  General: Alert, interactive in mild distress  Head:  Scalp is atraumatic  Eyes:  The pupils are equal, round, and reactive to light    EOM's intact    No scleral icterus  ENT:      Nose:  The external nose is normal  Ears:  External ears are normal  Mouth/Throat: The oropharynx is normal    Mucus membranes are moist      Neck:  Normal range of motion.      There is no rigidity.    Trachea is in the midline         CV:  Regular rate and rhythm    No murmur   Resp:  Breath sounds are clear bilaterally    Non-labored, no retractions or accessory muscle use      GI:  Abdomen is soft, no distension. "     Right upper quadrant and right lateral abdominal discomfort.      MS:  Normal strength in all 4 extremities, No midline cervical, thoracic, or lumbar tenderness  Skin:  Warm and dry, No rash or lesions noted.  Neuro: Strength 5/5 x4.  Sensation intact  In all 4 extremities.        Psych:  Awake. Alert.  Normal affect.      Appropriate interactions.    Emergency Department Course     ECG:  ECG taken at 0715, ECG read at 0720  Ventricular paced rhythm   Abnormal ECG   No significant change from ECG dated 09/29/17.    Rate 70 bpm. CA interval *. QRS duration 148. QT/QTc 462/498. P-R-T axes *,-75,94.     Imaging:  Radiology findings were communicated with the patient who voiced understanding of the findings.    CT Chest/Abdomen/Pelvis w Contrast  1. No acute abnormality is demonstrated. Specifically, there is no  evidence of pulmonary embolism or urinary tract obstruction.  2. There is a 1.4 cm area of nodular density in the right upper lobe  of the lung. This may represent a small focus of round atelectasis or  infiltrate. A follow-up CT should be considered to ensure resolution  or stability.  SAMANTHA BANUELOS MD    Laboratory:  Laboratory findings were communicated with the patient who voiced understanding of the findings.    Troponin (Collected 0728): <0.015  Lactic Acid: 1.3   D Dimer (Collected 0728): 0.6 (H)   CBC: WBC 5.2, HGB 10.7 (L),   CMP: Creat 1.29 (H), GFR 40 (L), Glucose 133 (H) o/w WNL  Lipase: 112   Glucose by meter: 152 (H)    UA with micro: WBC 6 (H), Few Amorphous crystals (A), Hyaline casts 6 (H), Squamous epithelial 15 (H), pH 8.0 (H), Few yeast (A), few bacteria (A) o/w negative      Urine Culture: Pending     Interventions:  0711 Pepcid 20 mg IV  0848 Morphine 2 mg IV   0848 Lidoderm patch transdermal      Emergency Department Course:  Nursing notes and vitals reviewed.  I entered the room.  I performed an exam of the patient as documented above.   IV was inserted and blood was  drawn for laboratory testing, results above.  The patient provided a urine sample here in the emergency department. This was sent for laboratory testing, findings above.   0823 the patient was rechecked and updated the patient regarding the results of the laboratory studies. Given the elevated d-dimer, will obtain CT scan and patient is in agreement with this plan  The patient was sent for CT Scan while in the emergency department, results above.   The patient received the above intervention(s).   1022 the patient was rechecked and was updated on the results of her remaining laboratory and imaging studies.    I discussed the treatment plan with the patient. They expressed understanding of this plan and consented to discharge. They will be discharged home with instructions for care and follow up. In addition, the patient will return to the emergency department if their symptoms persist, worsen, if new symptoms arise or if there is any concern.  All questions were answered.     Impression & Plan      Medical Decision Making:  Helene Gibbs is a 82 year old female who was seen and evaluated. A broad differential including pulmonary embolism, aortic dissection, pneumonia, pneumothorax, cholecystitis or retained gall stone, pancreatitis, pyelonephritis, ureteral lithiasis, and bowel obstruction were considered. The above workup was undertaken. Given the elevated d-dimer and reduced dose of Eliquis, CT imaging of the chest/abdomen/pelvis were performed, thankfully demonstrating no signs of acute PE or intrathoracic or abdominal pathology. I did relay the findings of the potential pulmonary nodule to the patient and have recommended a repeat examination by her primary care provider. The patient's pain is reproducible overlying the right flank and I favor a musculoskeletal etiology. She is not a candidate for antiinflammatories given her use of Eliquis, therefore she received Lidoderm patches. I see no indication for  narcotic pain medication. The patient has a history of shingles and is taking Gabapentin, this should help with post-herpetic neuralgia. There are no signs of an acute infection today. The patient was feeling improved, was feeling comfortable with the plan of action, and was subsequently discharged to home. She will return if new symptoms develop.     Diagnosis:    ICD-10-CM    1. Flank pain, right R10.9 Urine Culture   2. Pulmonary nodules R91.8    3. Anemia, stable D64.9      Disposition:   The patient was discharged to home.    Discharge Medications:  Discharge Medication List as of 4/6/2018 10:29 AM      START taking these medications    Details   lidocaine (LIDODERM) 5 % Patch Place 1 patch onto the skin every 24 hours for 10 daysDisp-10 patch, R-0Local Print           Scribe Disclosure:  I, Buddy Caba, am serving as a scribe on 4/6/2018 to document services personally performed by Duran Bishop MD, based on my observations and the provider's statements to me.    EMERGENCY DEPARTMENT       Duran Bishop MD  04/06/18 2322

## 2018-04-06 NOTE — DISCHARGE INSTRUCTIONS
Flank Pain, Uncertain Cause  The flank is the area between your upper abdomen and your back. Pain there is often caused by a problem with your kidneys. It might be a kidney infection or a kidney stone. Other causes of flank pain include spinal arthritis, a pinched nerve from a back injury, or a back muscle strain or spasm.  The cause of your flank pain is not certain. You may need other tests.  Home care  Follow these tips when caring for yourself at home:    You may use acetaminophen or ibuprofen to control pain, unless your health care provider prescribed another medicine. If you have chronic liver or kidney disease, talk with your provider before taking these medicines. Also talk with your provider first if you ve ever had a stomach ulcer or GI bleeding.    If the pain is coming from your muscles, you may get relief with ice or heat. During the first 2 days after the injury, put an ice pack on the painful area for 20 minutes every 2 to 4 hours. This will reduce swelling and pain. A hot shower, hot bath, or heating pad works well for a muscle spasm. You can start with ice, then switch to heat after 2 days. You might find that alternating ice and heat works well. Use the method that feels the best to you.  Follow-up care  Follow up with your healthcare provider if your symptoms don t get better over the next few days.  When to seek medical advice  Call your healthcare provider right away if any of these happen:    Repeated vomiting    Fever of 100.4 F (38 C) or higher, or as directed by your health care provider    Flank pain that gets worse    Pain that spreads to the front of your belly (abdomen)    Dizziness, weakness, or fainting    Blood in your urine    Burning feeling when you urinate or the need to urinate often    Pain in one of your legs that gets worse    Numbness or weakness in a leg  Date Last Reviewed: 10/1/2016    [id number] [employer]. [address] [facility], [place] [address]. All  rights reserved. This information is not intended as a substitute for professional medical care. Always follow your healthcare professional's instructions.    Discharge Instructions  Incidental Findings  Pulmonary Nodule, Repeat CT per primary provider  An incidental finding is something unexpected that was found while you were being treated and is felt to not be related to the reason that you came to the Emergency Department.  While this finding is not an emergency, you need to follow up with your primary provider (or occasionally a specialist) to determine if anything should be done about it.    These findings can come from:    Checking your vital signs (example: high blood pressure).    Taking your history (example: unexplained weight loss).    The physical exam (example: a heart murmur).    Laboratory study (example: anemia or low blood count).    X-rays/ultrasound/CT or other imaging (example: an unexplained mass).    Generally, every Emergency Department visit should have a follow-up clinic visit with either a primary or a specialty clinic/provider. Please follow-up as instructed by your emergency provider today.    Return to the Emergency Department if:    Your condition worsens.    You develop unexpected pain.    You now develop new symptoms or have new concerns.  If you were given a prescription for medicine here today, be sure to read all of the information (including the package insert) that comes with your prescription.  This will include important information about the medicine, its side effects, and any warnings that you need to know about.  The pharmacist who fills the prescription can provide more information and answer questions you may have about the medicine.  If you have questions or concerns that the pharmacist cannot address, please call or return to the Emergency Department.   Remember that you can always come back to the Emergency Department if you are not able to see your regular provider in  the amount of time listed above, if you get any new symptoms, or if there is anything that worries you.

## 2018-04-07 LAB
BACTERIA SPEC CULT: NORMAL
Lab: NORMAL
SPECIMEN SOURCE: NORMAL

## 2018-04-10 ENCOUNTER — TELEPHONE (OUTPATIENT)
Dept: FAMILY MEDICINE | Facility: CLINIC | Age: 83
End: 2018-04-10

## 2018-04-10 NOTE — TELEPHONE ENCOUNTER
"FYI to PCP:  Spoke with patient     Having flank pain right side   Ongoing x1 week   Thursday was so bad went to ER - had CT scan   Not in front, side to back   Was unable to find cause in ER   No improvement and no worsening   Pain is staying steady - taking APAP, oxycodone   Nothing helps much   Denies any sx of UTI - no symptoms other than back pain   Pain is 8/10 currently     Pt also stated she does not like her current endocrinologist \"he calls me at night and blabbers on and on\"   She is wondering if this might be a spine problem and states she previously followed up with an endocrinologist for spinal problems   Wants to discuss whether PCP would advise she see Dr. Moran    Advised pt be seen soon to follow up on pain. Pt scheduled OV 4/11/18 to follow up with PCP   Shawanda REID RN      "

## 2018-04-11 ENCOUNTER — RADIANT APPOINTMENT (OUTPATIENT)
Dept: GENERAL RADIOLOGY | Facility: CLINIC | Age: 83
End: 2018-04-11
Attending: INTERNAL MEDICINE
Payer: MEDICARE

## 2018-04-11 ENCOUNTER — OFFICE VISIT (OUTPATIENT)
Dept: FAMILY MEDICINE | Facility: CLINIC | Age: 83
End: 2018-04-11
Payer: MEDICARE

## 2018-04-11 VITALS
HEIGHT: 66 IN | HEART RATE: 70 BPM | DIASTOLIC BLOOD PRESSURE: 84 MMHG | WEIGHT: 168 LBS | RESPIRATION RATE: 16 BRPM | OXYGEN SATURATION: 98 % | TEMPERATURE: 97.9 F | BODY MASS INDEX: 27 KG/M2 | SYSTOLIC BLOOD PRESSURE: 158 MMHG

## 2018-04-11 DIAGNOSIS — M54.50 LUMBAR SPINE PAIN: ICD-10-CM

## 2018-04-11 DIAGNOSIS — Z23 NEED FOR VACCINATION: ICD-10-CM

## 2018-04-11 DIAGNOSIS — M54.6 PAIN IN THORACIC SPINE: ICD-10-CM

## 2018-04-11 DIAGNOSIS — K64.4 EXTERNAL HEMORRHOIDS: ICD-10-CM

## 2018-04-11 DIAGNOSIS — M54.10 RADICULAR PAIN: ICD-10-CM

## 2018-04-11 DIAGNOSIS — R91.1 PULMONARY NODULE: ICD-10-CM

## 2018-04-11 DIAGNOSIS — M54.10 RADICULAR PAIN: Primary | ICD-10-CM

## 2018-04-11 DIAGNOSIS — I10 ESSENTIAL HYPERTENSION: ICD-10-CM

## 2018-04-11 PROCEDURE — 99214 OFFICE O/P EST MOD 30 MIN: CPT | Mod: 25 | Performed by: INTERNAL MEDICINE

## 2018-04-11 PROCEDURE — 90471 IMMUNIZATION ADMIN: CPT | Performed by: INTERNAL MEDICINE

## 2018-04-11 PROCEDURE — 72070 X-RAY EXAM THORAC SPINE 2VWS: CPT

## 2018-04-11 PROCEDURE — 72100 X-RAY EXAM L-S SPINE 2/3 VWS: CPT

## 2018-04-11 PROCEDURE — 90750 HZV VACC RECOMBINANT IM: CPT | Performed by: INTERNAL MEDICINE

## 2018-04-11 RX ORDER — OXYCODONE AND ACETAMINOPHEN 5; 325 MG/1; MG/1
1 TABLET ORAL 3 TIMES DAILY PRN
Qty: 40 TABLET | Refills: 0 | Status: SHIPPED | OUTPATIENT
Start: 2018-04-11 | End: 2018-04-26

## 2018-04-11 RX ORDER — LISINOPRIL 10 MG/1
TABLET ORAL
Qty: 270 TABLET | Refills: 1 | Status: ON HOLD | OUTPATIENT
Start: 2018-04-11 | End: 2018-05-06

## 2018-04-11 NOTE — PROGRESS NOTES
"  SUBJECTIVE:   Helene Gibbs is a 83 year old female who presents to clinic today for the following health issues:      ED/UC Followup:    Facility:  [facility]   Date of visit: 4/6/18  Reason for visit: Abdominal Pain   Current Status: Patient still have pain        Patient walks with the help of walker  She was seen in the emergency room due to the pain  She showed me the pain is in the lower thoracic area  It is in the 12 dermatome region  She continued to feel pain but there is no rash  She has a history of shingles  She also has history of vertebral compression fracture.  Doing about from where the pain is coming  Currently she is taking Percocet which helps.  Norco is not as much helpful  This pain is affecting quality of her life  She also mentioned about she irritated the rectal area but then there was some misunderstanding and the pharmacy said that she irritated her vagina area while putting the suppository  So we cancel Anusol HC cream and prescribed clotrimazole    Problem list and histories reviewed & adjusted, as indicated.  Additional history: as documented    Labs reviewed in EPIC    Reviewed and updated as needed this visit by clinical staff       Reviewed and updated as needed this visit by Provider         ROS:  Constitutional, neuro, ENT, endocrine, pulmonary, cardiac, gastrointestinal, genitourinary, musculoskeletal, integument and psychiatric systems are negative, except as otherwise noted.    OBJECTIVE:                                                    /84 (BP Location: Right arm, Patient Position: Sitting, Cuff Size: Adult Regular)  Pulse 70  Temp 97.9  F (36.6  C) (Oral)  Resp 16  Ht 5' 6\" (1.676 m)  Wt 168 lb (76.2 kg)  LMP  (LMP Unknown)  SpO2 98%  Breastfeeding? No  BMI 27.12 kg/m2  Body mass index is 27.12 kg/(m^2).  GENERAL APPEARANCE:   Patient is fully alert awake oriented.  She is in mild distress due to pain  She walks with the help of " walker  She is very disappointed that something goes wrong with her every now and then           ASSESSMENT/PLAN:                                                      Helene was seen today for er f/u.    Diagnoses and all orders for this visit:    Radicular pain  Comments:  thoracic spine  Orders:  -     XR Thoracic Spine 2 Views; Future  -     XR Lumbar Spine 2/3 Views; Future  -     oxyCODONE-acetaminophen (PERCOCET) 5-325 MG per tablet; Take 1 tablet by mouth 3 times daily as needed for severe pain maximum 6 tablet(s) per day  Discussed my concern about her use of Percocet due to her age and underlying medical problem.  Patient states she cannot function without the pain medication as her pain is noticeable  It affects her quality of life  She does not feel any side effects  She requested Percocet which was provided as Norco is not working for her  Patient was educated about opiod pain medication. It can be habit-forming. It should be taken as prescribed. Do not mix it  with alcohol. Be careful with driving.Do not loose the  Prescription.  Do not overuse this medication. It is a controlled substance.  I have ordered x-ray of thoracic and lumbar spine as she has a history of compression fracture    Lumbar spine pain  -     XR Lumbar Spine 2/3 Views; Future  -     oxyCODONE-acetaminophen (PERCOCET) 5-325 MG per tablet; Take 1 tablet by mouth 3 times daily as needed for severe pain maximum 6 tablet(s) per day    Pain in thoracic spine  -     XR Thoracic Spine 2 Views; Future  -     oxyCODONE-acetaminophen (PERCOCET) 5-325 MG per tablet; Take 1 tablet by mouth 3 times daily as needed for severe pain maximum 6 tablet(s) per day    Essential hypertension  -     lisinopril (PRINIVIL/ZESTRIL) 10 MG tablet; Take # 2 tablets every morning and one tablet every evening    External hemorrhoids  -     hydrocortisone (ANUSOL-HC) 2.5 % cream; Place rectally 2 times daily  There was a misunderstanding patient mention about  irritation of the hemorrhoids but actually she irritated the vaginal area  So pharmacy called and we canceled the prescription of Anusol HC  Gave vaginal cream which she will get over-the-counter.    Need for vaccination  -     ZOSTER VACCINE RECOMBINANT ADJUVANTED IM NJX (SHINGRIX)  -     ADMIN 1st VACCINE    Pulmonary Nodule:  This patient had several questions and concerns.  I forgot to discuss her CT results which showed presence of abnormal area in her right lung  I called the patient and talked to her  She agreed about getting repeat CT in 3 months  Orders are placed and phone number provided to the patient    We also discussed the availability of shingrix   It was recommended as she has a history of recurrent shingles  She wanted to get it regardless of insurance coverage    Please call [facility] to schedule your test  [phone]    Follow up with Provider -    Patient Instructions   XR today   The dose of lisinopril is increased to total of 30 mg daily( # 2 tablets every morning and # 1 tablet every evening)  Shingrix today.  percocet can be habit-forming. It should be taken as prescribed. Do not mix it  with alcohol. Be careful with driving.Do not loose the  Prescription.  Do not overuse this medication. It is a controlled substance.  Follow up in one month          Neisha Esparza MD  [facility]

## 2018-04-11 NOTE — NURSING NOTE
Screening Questionnaire for Adult Immunization    Are you sick today?   No   Do you have allergies to medications, food, a vaccine component or latex?   No   Have you ever had a serious reaction after receiving a vaccination?   No   Do you have a long-term health problem with heart disease, lung disease, asthma, kidney disease, metabolic disease (e.g. diabetes), anemia, or other blood disorder?   Yes   Do you have cancer, leukemia, HIV/AIDS, or any other immune system problem?   No   In the past 3 months, have you taken medications that affect  your immune system, such as prednisone, other steroids, or anticancer drugs; drugs for the treatment of rheumatoid arthritis, Crohn s disease, or psoriasis; or have you had radiation treatments?   No   Have you had a seizure, or a brain or other nervous system problem?   No   During the past year, have you received a transfusion of blood or blood     products, or been given immune (gamma) globulin or antiviral drug?   Yes   For women: Are you pregnant or is there a chance you could become        pregnant during the next month?   No   Have you received any vaccinations in the past 4 weeks?   No     Immunization questionnaire was positive for at least one answer.  Notified Dr. Esparza.        Per orders of Dr. Esparza, injection of Shingrix given by Nikos Mendoza. Patient instructed to remain in clinic for 15 minutes afterwards, and to report any adverse reaction to me immediately.       Screening performed by Nikos Mendoza on 4/11/2018 at 1:16 PM.

## 2018-04-11 NOTE — MR AVS SNAPSHOT
After Visit Summary   4/11/2018    Helene Gibbs    MRN: [id number]           Patient Information     Date Of Birth          1935        Visit Information        Provider Department      4/11/2018 12:00 PM Neisha Esparza MD [facility]        Today's Diagnoses     Radicular pain    -  1    Lumbar spine pain        Pain in thoracic spine        Essential hypertension        External hemorrhoids          Care Instructions    XR today   The dose of lisinopril is increased to total of 30 mg daily( # 2 tablets every morning and # 1 tablet every evening)  Shingrix today.  percocet can be habit-forming. It should be taken as prescribed. Do not mix it  with alcohol. Be careful with driving.Do not loose the  Prescription.  Do not overuse this medication. It is a controlled substance.  Follow up in one month              Follow-ups after your visit        Your next 10 appointments already scheduled     Apr 16, 2018 12:15 PM CDT   Lymphedema Evaluation with Carmen Lucas OT   [facility] ([facility])    [address]  [address]  [place] [address]   [phone]            Apr 17, 2018  1:00 PM CDT   Return Visit with  Oncology Nurse   [facility] ([facility])    [facility]  [address]  [facility] [address]   [phone]            Apr 17, 2018  2:00 PM CDT   Return Visit with Estiven Cali MD   [facility] ([facility])    [facility]  [address]  [facility] [address]   [phone]            May 01, 2018  1:15 PM CDT   Teletrace with STONE TECH1   [facility] ([facility])    [address]  [place] [address]   [phone] OPT 2            May 04, 2018 11:00 AM CDT   US EMIGDIO DOPPLER NO EXERCISE 1-2 LVLS BILAT with SHVUS1   [facility] ([facility])    [address]  [facility] [address]   [phone]           Please bring a list of your medicines (including vitamins, minerals and over-the-counter drugs). Also, tell your doctor about any allergies you may have. Wear comfortable clothes and leave your valuables at home.  No caffeine or tobacco for 1 hour prior to exam.  Please call the Imaging Department at your exam site with any questions.            May 04, 2018 11:30 AM CDT   Return Visit with Pieter Watson MD   [facility] ([facility])    [address]  [facility] [id number] [phone]              Future tests that were ordered for you today     Open Future Orders        Priority Expected Expires Ordered    XR Thoracic Spine 2 Views Routine 4/11/2018 4/11/2019 4/11/2018    XR Lumbar Spine 2/3 Views Routine 4/11/2018 4/11/2019 4/11/2018            Who to contact     If you have questions or need follow up information about today's clinic visit or your schedule please contact [facility] directly at [phone].  Normal or non-critical lab and imaging results will be communicated to you by [employer], letter or phone within 4 business days after the clinic has received the results. If you do not hear from us within 7 days, please contact the clinic through SDIt or phone. If you have a critical or abnormal lab result, we will notify you by phone as soon as possible.  Submit refill requests through [employer] or call your pharmacy and they will forward the refill request to us. Please allow 3 business days for your refill to be completed.          Additional Information About Your Visit        [employer] Information     [employer] gives you secure access to your electronic health record. If you see a primary care provider, you can also send messages to your care team and make appointments. If you have  "questions, please call your primary care clinic.  If you do not have a primary care provider, please call [phone] and they will assist you.        Care EveryWhere ID     This is your Care EveryWhere ID. This could be used by other organizations to access your [place] medical records  [id number]        Your Vitals Were     Pulse Temperature Respirations Height Last Period Pulse Oximetry    70 97.9  F (36.6  C) (Oral) 16 5' 6\" (1.676 m) (LMP Unknown) 98%    Breastfeeding? BMI (Body Mass Index)                No 27.12 kg/m2           Blood Pressure from Last 3 Encounters:   04/11/18 158/84   04/06/18 121/79   03/22/18 154/76    Weight from Last 3 Encounters:   04/11/18 168 lb (76.2 kg)   04/06/18 170 lb (77.1 kg)   03/22/18 169 lb (76.7 kg)                 Today's Medication Changes          These changes are accurate as of 4/11/18 12:33 PM.  If you have any questions, ask your nurse or doctor.               Start taking these medicines.        Dose/Directions    oxyCODONE-acetaminophen 5-325 MG per tablet   Commonly known as:  PERCOCET   Used for:  Radicular pain, Lumbar spine pain, Pain in thoracic spine   Started by:  Neisha Esparza MD        Dose:  1 tablet   Take 1 tablet by mouth 3 times daily as needed for severe pain maximum 6 tablet(s) per day   Quantity:  40 tablet   Refills:  0         These medicines have changed or have updated prescriptions.        Dose/Directions    * hydrocortisone 2.5 % cream   Commonly known as:  ANUSOL-HC   This may have changed:  Another medication with the same name was added. Make sure you understand how and when to take each.   Used for:  External hemorrhoids   Changed by:  Neisha Esparza MD        Place rectally 2 times daily as needed for hemorrhoids   Quantity:  28 g   Refills:  1       * hydrocortisone 2.5 % cream   Commonly known as:  ANUSOL-HC   This may have changed:  You were already taking a medication with the same name, and this prescription was added. " Make sure you understand how and when to take each.   Used for:  External hemorrhoids   Changed by:  Neisha Esparza MD        Place rectally 2 times daily   Quantity:  30 g   Refills:  0       lisinopril 10 MG tablet   Commonly known as:  PRINIVIL/ZESTRIL   This may have changed:    - how much to take  - how to take this  - when to take this  - additional instructions   Used for:  Essential hypertension   Changed by:  Neisha Esparza MD        Take # 2 tablets every morning and one tablet every evening   Quantity:  270 tablet   Refills:  1       * Notice:  This list has 2 medication(s) that are the same as other medications prescribed for you. Read the directions carefully, and ask your doctor or other care provider to review them with you.      Stop taking these medicines if you haven't already. Please contact your care team if you have questions.     HYDROcodone-acetaminophen 5-325 MG per tablet   Commonly known as:  NORCO   Stopped by:  Neisha Esparza MD                Where to get your medicines      These medications were sent to [facility] [address]  [address], [facility] [address]     Phone:  [phone]     hydrocortisone 2.5 % cream    lisinopril 10 MG tablet         Some of these will need a paper prescription and others can be bought over the counter.  Ask your nurse if you have questions.     Bring a paper prescription for each of these medications     oxyCODONE-acetaminophen 5-325 MG per tablet                Primary Care Provider Office Phone # Fax #    Neisha Esparza [phone] [address]  [facility] [address]        Goals        General    start date: 4/17/14 I will weigh myself daily and if any weight gain or shortness of breath I will call the clinic. (pt-stated)     Notes - Note created  4/17/2014  3:59 PM by Margareth Pichardo RN    As of today's date 4/17/2014 goal  is met at 0 - 25%.   Goal Status:  Active        Equal Access to Services     BALWINDERSAMRA GARRETT : Hadii aad ruslan no Brady, madhurida erumdelvis, maribell downs chasmaik, pamela mariin hayaakristina dohertytori mckeondc nolasco. So [facility] [phone].    ATENCIÓN: Si habla español, tiene a gupta disposición servicios gratuitos de asistencia lingüística. Llame al [phone].    We comply with applicable federal civil rights laws and [place] laws. We do not discriminate on the basis of race, color, national origin, age, disability, sex, sexual orientation, or gender identity.            Thank you!     Thank you for choosing [facility]  for your care. Our goal is always to provide you with excellent care. Hearing back from our patients is one way we can continue to improve our services. Please take a few minutes to complete the written survey that you may receive in the mail after your visit with us. Thank you!             Your Updated Medication List - Protect others around you: Learn how to safely use, store and throw away your medicines at www.disposemymeds.org.          This list is accurate as of 4/11/18 12:33 PM.  Always use your most recent med list.                   Brand Name Dispense Instructions for use Diagnosis    albuterol 108 (90 Base) MCG/ACT Inhaler    PROAIR HFA/PROVENTIL HFA/VENTOLIN HFA    1 Inhaler    Inhale 2 puffs into the lungs every 4 hours as needed for shortness of breath / dyspnea or wheezing    COPD (chronic obstructive pulmonary disease) (H)       betamethasone valerate 0.1 % cream    VALISONE     Apply topically 2 times daily as needed (Use Sparingly)        ELIQUIS 2.5 MG tablet   Generic drug:  apixaban ANTICOAGULANT     60 tablet    TAKE ONE TABLET BY MOUTH TWICE A DAY    Atrial fibrillation, unspecified type (H)       fluticasone 50 MCG/ACT spray    FLONASE     Spray 1-2 sprays into both nostrils every evening        furosemide 20 MG tablet    LASIX    180 tablet    TAKE 1-2 TABS BY MOUTH  ONCE DAILY. MAY REPEAT AFTER NOON IF NEEDED FOR WEIGHT GAIN/2+ EDEMA    Swelling       GABAPENTIN PO      Take 300 mg by mouth daily        GLUCOTROL PO      Take 5 mg by mouth every morning (before breakfast)        * hydrocortisone 2.5 % cream    ANUSOL-HC    28 g    Place rectally 2 times daily as needed for hemorrhoids    External hemorrhoids       * hydrocortisone 2.5 % cream    ANUSOL-HC    30 g    Place rectally 2 times daily    External hemorrhoids       hydrOXYzine 25 MG capsule    VISTARIL    180 capsule    Take 1 capsule (25 mg) by mouth 2 times daily as needed for itching    Itching       lidocaine 5 % Patch    LIDODERM    10 patch    Place 1 patch onto the skin every 24 hours for 10 days        lisinopril 10 MG tablet    PRINIVIL/ZESTRIL    270 tablet    Take # 2 tablets every morning and one tablet every evening    Essential hypertension       magnesium hydroxide 400 MG/5ML suspension    MILK OF MAGNESIA     Take 30 mLs by mouth At Bedtime        nystatin cream    MYCOSTATIN     Apply topically 2 times daily as needed for dry skin        ondansetron 4 MG ODT tab    ZOFRAN ODT    30 tablet    Take 1 tablet (4 mg) by mouth every 6 hours as needed for nausea    Nausea       oxyCODONE-acetaminophen 5-325 MG per tablet    PERCOCET    40 tablet    Take 1 tablet by mouth 3 times daily as needed for severe pain maximum 6 tablet(s) per day    Radicular pain, Lumbar spine pain, Pain in thoracic spine       PRESERVISION/LUTEIN Caps      Take 1 capsule by mouth daily        ranitidine 150 MG tablet    ZANTAC    180 tablet    TAKE ONE TABLET BY MOUTH TWO TIMES A DAY    Dyspepsia       simvastatin 10 MG tablet    ZOCOR    90 tablet    TAKE 1 TABLET (10 MG) BY MOUTH AT BEDTIME    Type 2 diabetes mellitus with diabetic nephropathy, without long-term current use of insulin (H)       tiotropium 18 MCG capsule    SPIRIVA     Inhale 18 mcg into the lungs every evening        * Notice:  This list has 2 medication(s) that  are the same as other medications prescribed for you. Read the directions carefully, and ask your doctor or other care provider to review them with you.

## 2018-04-11 NOTE — LETTER
[facility]  [address] [facility]  [address]  [place]  [address]  Tel: [phone]    April 12, 2018    Helene Gibbs  [address]   [facility] [address]        Dear Ms. Gibbs,    This is to inform you regarding your test result.    I spoke to you on phone but sending you a result note also.  Your x-ray of thoracic spine showed  There is a thoracolumbar scoliosis. Vertebroplasties at T12  and L1. Mild midthoracic vertebral body compression fracture is  unchanged. No acute fractures evident. Multilevel degenerative disease  and prominent thoracic kyphosis  I have referred you to medical spine specialist for further evaluation.  Discuss  with them that whether you are a candidate for getting epidural steroid injection    Sincerely,    Neisha Esparza MD/ASHLEY

## 2018-04-11 NOTE — NURSING NOTE
"Chief Complaint   Patient presents with     ER F/U       Initial /84 (BP Location: Right arm, Patient Position: Sitting, Cuff Size: Adult Regular)  Pulse 70  Temp 97.9  F (36.6  C) (Oral)  Resp 16  Ht 5' 6\" (1.676 m)  Wt 168 lb (76.2 kg)  LMP  (LMP Unknown)  SpO2 98%  Breastfeeding? No  BMI 27.12 kg/m2 Estimated body mass index is 27.12 kg/(m^2) as calculated from the following:    Height as of this encounter: 5' 6\" (1.676 m).    Weight as of this encounter: 168 lb (76.2 kg).  Medication Reconciliation: complete.  YOBANY Adam      "

## 2018-04-12 DIAGNOSIS — G89.29 CHRONIC BILATERAL LOW BACK PAIN, WITH SCIATICA PRESENCE UNSPECIFIED: ICD-10-CM

## 2018-04-12 DIAGNOSIS — M54.5 CHRONIC BILATERAL LOW BACK PAIN, WITH SCIATICA PRESENCE UNSPECIFIED: ICD-10-CM

## 2018-04-12 DIAGNOSIS — M54.9 UPPER BACK PAIN: Primary | ICD-10-CM

## 2018-04-12 ASSESSMENT — PATIENT HEALTH QUESTIONNAIRE - PHQ9: SUM OF ALL RESPONSES TO PHQ QUESTIONS 1-9: 11

## 2018-04-12 NOTE — PROGRESS NOTES
Please notify patient by sending following letter with copy of test results      Shakira Helene,    This is to inform you regarding your test result.    I spoke to you on phone but sending you a result note also.  Your x-ray of thoracic spine showed  There is a thoracolumbar scoliosis. Vertebroplasties at T12  and L1. Mild midthoracic vertebral body compression fracture is  unchanged. No acute fractures evident. Multilevel degenerative disease  and prominent thoracic kyphosis  I have referred you to medical spine specialist for further evaluation.  Discuss  with them that whether you are a candidate for getting epidural steroid injection      Sincerely,      Dr.Nasima Vilma MD,FACP

## 2018-04-12 NOTE — PROGRESS NOTES
Shakira Narayan,    This is to inform you regarding your test result.    I spoke to you on phone but sending you a result note also.  XR of lumbar spine shows:    Previous vertebroplasties at T12 and L1. Extensive  multilevel degenerative disc and facet disease without significant  change in appearance from the previous exam. Convex left thoracolumbar  scoliosis. No acute fracture or malalignment  I have referred you to spine specialist.  Please make the appointment to see them by calling the following number:  All areas ~ [phone]       Sincerely,      Dr.Nasima Vilma MD,FACP

## 2018-04-16 ENCOUNTER — OFFICE VISIT (OUTPATIENT)
Dept: FAMILY MEDICINE | Facility: CLINIC | Age: 83
End: 2018-04-16
Payer: MEDICARE

## 2018-04-16 VITALS
RESPIRATION RATE: 16 BRPM | TEMPERATURE: 98.1 F | HEART RATE: 70 BPM | OXYGEN SATURATION: 98 % | BODY MASS INDEX: 28.12 KG/M2 | SYSTOLIC BLOOD PRESSURE: 133 MMHG | HEIGHT: 66 IN | WEIGHT: 175 LBS | DIASTOLIC BLOOD PRESSURE: 71 MMHG

## 2018-04-16 DIAGNOSIS — R30.0 DYSURIA: ICD-10-CM

## 2018-04-16 DIAGNOSIS — I48.91 ATRIAL FIBRILLATION, UNSPECIFIED TYPE (H): ICD-10-CM

## 2018-04-16 DIAGNOSIS — N39.0 UTI (URINARY TRACT INFECTION), UNCOMPLICATED: Primary | ICD-10-CM

## 2018-04-16 LAB
ALBUMIN UR-MCNC: 100 MG/DL
APPEARANCE UR: ABNORMAL
BACTERIA #/AREA URNS HPF: ABNORMAL /HPF
BILIRUB UR QL STRIP: NEGATIVE
COLOR UR AUTO: YELLOW
GLUCOSE UR STRIP-MCNC: NEGATIVE MG/DL
HGB UR QL STRIP: ABNORMAL
KETONES UR STRIP-MCNC: NEGATIVE MG/DL
LEUKOCYTE ESTERASE UR QL STRIP: ABNORMAL
NITRATE UR QL: NEGATIVE
PH UR STRIP: 8 PH (ref 5–7)
RBC #/AREA URNS AUTO: >100 /HPF
SOURCE: ABNORMAL
SP GR UR STRIP: 1.02 (ref 1–1.03)
UROBILINOGEN UR STRIP-ACNC: 0.2 EU/DL (ref 0.2–1)
WBC #/AREA URNS AUTO: >100 /HPF

## 2018-04-16 PROCEDURE — 99214 OFFICE O/P EST MOD 30 MIN: CPT | Performed by: INTERNAL MEDICINE

## 2018-04-16 PROCEDURE — 87186 SC STD MICRODIL/AGAR DIL: CPT | Performed by: INTERNAL MEDICINE

## 2018-04-16 PROCEDURE — 87086 URINE CULTURE/COLONY COUNT: CPT | Performed by: INTERNAL MEDICINE

## 2018-04-16 PROCEDURE — 81001 URINALYSIS AUTO W/SCOPE: CPT | Performed by: INTERNAL MEDICINE

## 2018-04-16 PROCEDURE — 87088 URINE BACTERIA CULTURE: CPT | Performed by: INTERNAL MEDICINE

## 2018-04-16 RX ORDER — CIPROFLOXACIN 250 MG/1
250 TABLET, FILM COATED ORAL 2 TIMES DAILY
Qty: 14 TABLET | Refills: 0 | Status: SHIPPED | OUTPATIENT
Start: 2018-04-16 | End: 2019-01-01

## 2018-04-16 NOTE — NURSING NOTE
"Chief Complaint   Patient presents with     UTI       Initial /71 (BP Location: Right arm, Patient Position: Chair, Cuff Size: Adult Regular)  Pulse 70  Temp 98.1  F (36.7  C) (Tympanic)  Resp 16  Ht 5' 6\" (1.676 m)  Wt 175 lb (79.4 kg)  LMP  (LMP Unknown)  SpO2 98%  Breastfeeding? No  BMI 28.25 kg/m2 Estimated body mass index is 28.25 kg/(m^2) as calculated from the following:    Height as of this encounter: 5' 6\" (1.676 m).    Weight as of this encounter: 175 lb (79.4 kg).  Medication Reconciliation: complete   Carrie Arreola, ELLIE      "

## 2018-04-16 NOTE — MR AVS SNAPSHOT
After Visit Summary   4/16/2018    Helene Gibbs    MRN: [id number]           Patient Information     Date Of Birth          1935        Visit Information        Provider Department      4/16/2018 1:30 PM Neisha Esparza MD [facility]        Today's Diagnoses     UTI (urinary tract infection), uncomplicated    -  1    Dysuria        Atrial fibrillation, unspecified type (H)          Care Instructions    Urine analysis today  I refilled your prescriptions  Stay well-hydrated  Take Cipro 250 mg twice daily for 7 days  Watch for low blood sugar as Cipro interacts with glipizide  So watch for hypoglycemic symptoms.  Do repeat UA after finishing antibiotic course  Seek sooner medical attention if there is any worsening of symptoms or problems              Follow-ups after your visit        Your next 10 appointments already scheduled     Apr 17, 2018  1:00 PM CDT   Return Visit with  Oncology Nurse   [facility] ([facility])    [facility]  [address]  [facility] [address]   [phone]            Apr 17, 2018  2:00 PM CDT   Return Visit with Estiven Cali MD   [facility] ([facility])    [facility]  [address]  [facility] [address]   [phone]            Apr 23, 2018  1:20 PM CDT   New Visit with Jasmeet Trujillo MD   [facility] ([facility])    [address]  [address]  [facility] [address]   [phone]            Apr 25, 2018  2:00 PM CDT   Lymphedema Evaluation with Marlen Elizabeth OT   [facility] ([facility])    [address] [place] [address]  [address]  [facility] [address]   [phone]            May 01, 2018  1:15 PM CDT   Teletrace with STONE TECH1   [facility]   [place] ([facility])    [address]  [facility] [address]    [phone] OPT 2            May 04, 2018 11:00 AM CDT   US EMIGDIO DOPPLER NO EXERCISE 1-2 LVLS BILAT with SHVUS1   [facility] ([facility])    [address]  [place] [address]   [phone]           Please bring a list of your medicines (including vitamins, minerals and over-the-counter drugs). Also, tell your doctor about any allergies you may have. Wear comfortable clothes and leave your valuables at home.  No caffeine or tobacco for 1 hour prior to exam.  Please call the Imaging Department at your exam site with any questions.            May 04, 2018 11:30 AM CDT   Return Visit with Pieter Watson MD   [facility] ([facility])    [address]  [place] [address] [phone]              Future tests that were ordered for you today     Open Future Orders        Priority Expected Expires Ordered    UA with Microscopic reflex to Culture Routine 4/23/2018 9/28/2018 4/16/2018            Who to contact     If you have questions or need follow up information about today's clinic visit or your schedule please contact [facility] directly at [phone].  Normal or non-critical lab and imaging results will be communicated to you by [employer], letter or phone within 4 business days after the clinic has received the results. If you do not hear from us within 7 days, please contact the clinic through MyChart or phone. If you have a critical or abnormal lab result, we will notify you by phone as soon as possible.  Submit refill requests through [employer] or call your pharmacy and they will forward the refill request to us. Please allow 3 business days for your refill to be completed.          Additional Information About Your Visit        [employer] Information     [employer] gives you secure access to your electronic health record. If you see a primary  "care provider, you can also send messages to your care team and make appointments. If you have questions, please call your primary care clinic.  If you do not have a primary care provider, please call [phone] and they will assist you.        Care EveryWhere ID     This is your Care EveryWhere ID. This could be used by other organizations to access your [facility] medical records  [id number]        Your Vitals Were     Pulse Temperature Respirations Height Last Period Pulse Oximetry    70 98.1  F (36.7  C) (Tympanic) 16 5' 6\" (1.676 m) (LMP Unknown) 98%    Breastfeeding? BMI (Body Mass Index)                No 28.25 kg/m2           Blood Pressure from Last 3 Encounters:   04/16/18 133/71   04/11/18 158/84   04/06/18 121/79    Weight from Last 3 Encounters:   04/16/18 175 lb (79.4 kg)   04/11/18 168 lb (76.2 kg)   04/06/18 170 lb (77.1 kg)              We Performed the Following     *UA reflex to Microscopic and Culture ([facility] (except [place] and [place])     Urine Microscopic          Today's Medication Changes          These changes are accurate as of 4/16/18  2:09 PM.  If you have any questions, ask your nurse or doctor.               Start taking these medicines.        Dose/Directions    ciprofloxacin 250 MG tablet   Commonly known as:  CIPRO   Used for:  UTI (urinary tract infection), uncomplicated   Started by:  Neisha Esparza MD        Dose:  250 mg   Take 1 tablet (250 mg) by mouth 2 times daily for 7 days   Quantity:  14 tablet   Refills:  0         These medicines have changed or have updated prescriptions.        Dose/Directions    apixaban ANTICOAGULANT 2.5 MG tablet   Commonly known as:  ELIQUIS   This may have changed:  See the new instructions.   Used for:  Atrial fibrillation, unspecified type (H)   Changed by:  Neisha Esparza MD        Dose:  2.5 mg   Take 1 tablet (2.5 mg) by mouth 2 times daily   Quantity:  180 tablet   Refills:  3            Where to get your " medicines      These medications were sent to [facility] [place], [place] - [address]  [address], [address], [place] [address]     Phone:  [phone]     apixaban ANTICOAGULANT 2.5 MG tablet    ciprofloxacin 250 MG tablet                Primary Care Provider Office Phone # Fax #    Neisha MARIMAR Esparza [phone] [address]  [facility] [address]        Goals        General    start date: 4/17/14 I will weigh myself daily and if any weight gain or shortness of breath I will call the clinic. (pt-stated)     Notes - Note created  4/17/2014  3:59 PM by Margareth Pichardo RN    As of today's date 4/17/2014 goal is met at 0 - 25%.   Goal Status:  Active        Equal Access to Services     [facility]: Hadii aad ruslan hadasho Caitlyn, waaxda luqadaha, qaybta kaalmada adetoriyada, pamela mccullough . So [facility] [phone].    ATENCIÓN: Si habla español, tiene a gupta disposición servicios gratuitos de asistencia lingüística. Llame al [phone].    We comply with applicable federal civil rights laws and [place] laws. We do not discriminate on the basis of race, color, national origin, age, disability, sex, sexual orientation, or gender identity.            Thank you!     Thank you for choosing [facility]  for your care. Our goal is always to provide you with excellent care. Hearing back from our patients is one way we can continue to improve our services. Please take a few minutes to complete the written survey that you may receive in the mail after your visit with us. Thank you!             Your Updated Medication List - Protect others around you: Learn how to safely use, store and throw away your medicines at www.disposemymeds.org.          This list is accurate as of 4/16/18  2:09 PM.  Always use your most recent med list.                   Brand Name Dispense Instructions for use Diagnosis     albuterol 108 (90 Base) MCG/ACT Inhaler    PROAIR HFA/PROVENTIL HFA/VENTOLIN HFA    1 Inhaler    Inhale 2 puffs into the lungs every 4 hours as needed for shortness of breath / dyspnea or wheezing    COPD (chronic obstructive pulmonary disease) (H)       apixaban ANTICOAGULANT 2.5 MG tablet    ELIQUIS    180 tablet    Take 1 tablet (2.5 mg) by mouth 2 times daily    Atrial fibrillation, unspecified type (H)       BETA BLOCKER NOT PRESCRIBED (INTENTIONAL)      Beta Blocker not prescribed intentionally due to Other: due to ablation procedure        betamethasone valerate 0.1 % cream    VALISONE     Apply topically 2 times daily as needed (Use Sparingly)        ciprofloxacin 250 MG tablet    CIPRO    14 tablet    Take 1 tablet (250 mg) by mouth 2 times daily for 7 days    UTI (urinary tract infection), uncomplicated       fluticasone 50 MCG/ACT spray    FLONASE     Spray 1-2 sprays into both nostrils every evening        furosemide 20 MG tablet    LASIX    180 tablet    TAKE 1-2 TABS BY MOUTH ONCE DAILY. MAY REPEAT AFTER NOON IF NEEDED FOR WEIGHT GAIN/2+ EDEMA    Swelling       GABAPENTIN PO      Take 300 mg by mouth daily        GLUCOTROL PO      Take 5 mg by mouth every morning (before breakfast)        hydrocortisone 2.5 % cream    ANUSOL-HC    30 g    Place rectally 2 times daily    External hemorrhoids       hydrOXYzine 25 MG capsule    VISTARIL    180 capsule    Take 1 capsule (25 mg) by mouth 2 times daily as needed for itching    Itching       lidocaine 5 % Patch    LIDODERM    10 patch    Place 1 patch onto the skin every 24 hours for 10 days        lisinopril 10 MG tablet    PRINIVIL/ZESTRIL    270 tablet    Take # 2 tablets every morning and one tablet every evening    Essential hypertension       magnesium hydroxide 400 MG/5ML suspension    MILK OF MAGNESIA     Take 30 mLs by mouth At Bedtime        nystatin cream    MYCOSTATIN     Apply topically 2 times daily as needed for dry skin        ondansetron 4 MG  ODT tab    ZOFRAN ODT    30 tablet    Take 1 tablet (4 mg) by mouth every 6 hours as needed for nausea    Nausea       oxyCODONE-acetaminophen 5-325 MG per tablet    PERCOCET    40 tablet    Take 1 tablet by mouth 3 times daily as needed for severe pain maximum 6 tablet(s) per day    Radicular pain, Lumbar spine pain, Pain in thoracic spine       PRESERVISION/LUTEIN Caps      Take 1 capsule by mouth daily        ranitidine 150 MG tablet    ZANTAC    180 tablet    TAKE ONE TABLET BY MOUTH TWO TIMES A DAY    Dyspepsia       simvastatin 10 MG tablet    ZOCOR    90 tablet    TAKE 1 TABLET (10 MG) BY MOUTH AT BEDTIME    Type 2 diabetes mellitus with diabetic nephropathy, without long-term current use of insulin (H)       tiotropium 18 MCG capsule    SPIRIVA     Inhale 18 mcg into the lungs every evening

## 2018-04-16 NOTE — PROGRESS NOTES
"  SUBJECTIVE:   Helene Gibbs is a 83 year old female who presents to clinic today for the following health issues:      URINARY TRACT SYMPTOMS      Duration: 2 days    Description  dysuria, frequency, urgency, odor and incontinence    Intensity:  moderate    Accompanying signs and symptoms:  Fever/chills: no   Flank pain no   Nausea and vomiting: no   Vaginal symptoms: none  Abdominal/Pelvic Pain: no     History  History of frequent UTI's: no   History of kidney stones: no   Sexually Active: no   Possibility of pregnancy: No    Precipitating or alleviating factors: None    Therapies tried and outcome: none      Reports that she irritated her vagina while putting in suppository  Reports dysuria, urgency  Denies vaginal discharge, hematuria  Denies flank pain    Problem list and histories reviewed & adjusted, as indicated.  Additional history: as documented    Medications and Labs reviewed in EPIC    Reviewed and updated as needed this visit by clinical staff  Tobacco  Allergies  Meds       Reviewed and updated as needed this visit by Provider       ROS:  Constitutional, HEENT, cardiovascular, pulmonary, GI, , musculoskeletal, neuro, skin, endocrine and psych systems are negative, except as otherwise noted.    POSITIVE for dysuria, frequency, urgency, odor and  incontinence    This document serves as a record of the services and decisions personally performed and made by Neisha Esparza MD. It was created on her behalf by Cassandra Holcomb, a trained medical scribe. The creation of this document is based on the provider's statements to the medical scribe.  Cassandra Holcomb 1:57 PM April 16, 2018    OBJECTIVE:     /71 (BP Location: Right arm, Patient Position: Chair, Cuff Size: Adult Regular)  Pulse 70  Temp 98.1  F (36.7  C) (Tympanic)  Resp 16  Ht 1.676 m (5' 6\")  Wt 79.4 kg (175 lb)  LMP  (LMP Unknown)  SpO2 98%  Breastfeeding? No  BMI 28.25 kg/m2  Body mass index is 28.25 kg/(m^2).  GENERAL: " overweight, uses assisted walker, alert and no distress  SKIN: no suspicious lesions or rashes  PSYCH: mentation appears normal, affect normal/bright    Diagnostic Test Results:  Results for orders placed or performed in visit on 04/16/18 (from the past 24 hour(s))   *UA reflex to Microscopic and Culture ([facility] (except [place] and [place])   Result Value Ref Range    Color Urine Yellow     Appearance Urine Cloudy     Glucose Urine Negative NEG^Negative mg/dL    Bilirubin Urine Negative NEG^Negative    Ketones Urine Negative NEG^Negative mg/dL    Specific Gravity Urine 1.020 1.003 - 1.035    Blood Urine Large (A) NEG^Negative    pH Urine 8.0 (H) 5.0 - 7.0 pH    Protein Albumin Urine 100 (A) NEG^Negative mg/dL    Urobilinogen Urine 0.2 0.2 - 1.0 EU/dL    Nitrite Urine Negative NEG^Negative    Leukocyte Esterase Urine Large (A) NEG^Negative    Source Midstream Urine    Urine Microscopic   Result Value Ref Range    WBC Urine >100 (A) OTO5^0 - 5 /HPF    RBC Urine >100 (A) OTO2^O - 2 /HPF    Bacteria Urine Moderate (A) NEG^Negative /HPF     *Note: Due to a large number of results and/or encounters for the requested time period, some results have not been displayed. A complete set of results can be found in Results Review.     ASSESSMENT/PLAN:     Helene was seen today for uti.    Diagnoses and all orders for this visit:    UTI (urinary tract infection), uncomplicated  -     ciprofloxacin (CIPRO) 250 MG tablet; Take 1 tablet (250 mg) by mouth 2 times daily for 7 days  -     Urine Microscopic  -     UA with Microscopic reflex to Culture; Future  Reports that she irritated her vagina while putting in suppository  Reports dysuria, urgency for past two days  Denies vaginal discharge, hematuria, flank pain  UA was strongly positive  Educated patient about female genital anatomy  Explained to her that it's much easier for women to get affected with UTIs  Prescribed cipro  Educated patient that  antibiotics could exacerbate symptoms of hypoglycemia  So watch for hypoglycemic symptoms  Advised staying well-hydrated  Advised repeat UA after she's done with antibiotics    Dysuria  -     *UA reflex to Microscopic and Culture ([place] [facility] (except [place] and [place])  See above    Atrial fibrillation, unspecified type (H)  -     apixaban ANTICOAGULANT (ELIQUIS) 2.5 MG tablet; Take 1 tablet (2.5 mg) by mouth 2 times daily  Stable  Compliant with medicationis  HR under control.    She has appointment with hematologist tomorrow.  Patient Instructions   Urine analysis today  I refilled your prescriptions  Stay well-hydrated  Take Cipro 250 mg twice daily for 7 days  Watch for low blood sugar as Cipro interacts with glipizide  So watch for hypoglycemic symptoms.  Do repeat UA after finishing antibiotic course  Seek sooner medical attention if there is any worsening of symptoms or problems    The information in this document, created by the medical scribe for me, accurately reflects the services I personally performed and the decisions made by me. I have reviewed and approved this document for accuracy prior to leaving the patient care area.  April 16, 2018 2:11 PM    Neisha Esparza MD  [facility]

## 2018-04-16 NOTE — PATIENT INSTRUCTIONS
Urine analysis today  I refilled your prescriptions  Stay well-hydrated  Take Cipro 250 mg twice daily for 7 days  Watch for low blood sugar as Cipro interacts with glipizide  So watch for hypoglycemic symptoms.  Do repeat UA after finishing antibiotic course  Seek sooner medical attention if there is any worsening of symptoms or problems

## 2018-04-17 ENCOUNTER — ONCOLOGY VISIT (OUTPATIENT)
Dept: ONCOLOGY | Facility: CLINIC | Age: 83
End: 2018-04-17
Attending: INTERNAL MEDICINE
Payer: MEDICARE

## 2018-04-17 ENCOUNTER — HOSPITAL ENCOUNTER (OUTPATIENT)
Facility: CLINIC | Age: 83
Setting detail: SPECIMEN
Discharge: HOME | End: 2018-04-17
Attending: INTERNAL MEDICINE | Admitting: INTERNAL MEDICINE
Payer: MEDICARE

## 2018-04-17 VITALS
SYSTOLIC BLOOD PRESSURE: 117 MMHG | HEART RATE: 84 BPM | BODY MASS INDEX: 28.76 KG/M2 | WEIGHT: 178.2 LBS | RESPIRATION RATE: 16 BRPM | OXYGEN SATURATION: 96 % | DIASTOLIC BLOOD PRESSURE: 62 MMHG

## 2018-04-17 DIAGNOSIS — D64.9 ANEMIA, UNSPECIFIED TYPE: ICD-10-CM

## 2018-04-17 DIAGNOSIS — T45.4X5D ADVERSE EFFECT OF IRON, SUBSEQUENT ENCOUNTER: ICD-10-CM

## 2018-04-17 DIAGNOSIS — D50.9 IRON DEFICIENCY ANEMIA, UNSPECIFIED IRON DEFICIENCY ANEMIA TYPE: Primary | ICD-10-CM

## 2018-04-17 DIAGNOSIS — E61.1 IRON DEFICIENCY: ICD-10-CM

## 2018-04-17 LAB
ERYTHROCYTE [DISTWIDTH] IN BLOOD BY AUTOMATED COUNT: 16.1 % (ref 10–15)
FERRITIN SERPL-MCNC: 9 NG/ML (ref 8–252)
HCT VFR BLD AUTO: 33 % (ref 35–47)
HGB BLD-MCNC: 9.9 G/DL (ref 11.7–15.7)
IRON SATN MFR SERPL: 6 % (ref 15–46)
IRON SERPL-MCNC: 22 UG/DL (ref 35–180)
MCH RBC QN AUTO: 26.3 PG (ref 26.5–33)
MCHC RBC AUTO-ENTMCNC: 30 G/DL (ref 31.5–36.5)
MCV RBC AUTO: 88 FL (ref 78–100)
PLATELET # BLD AUTO: 265 10E9/L (ref 150–450)
RBC # BLD AUTO: 3.77 10E12/L (ref 3.8–5.2)
TIBC SERPL-MCNC: 400 UG/DL (ref 240–430)
WBC # BLD AUTO: 6.2 10E9/L (ref 4–11)

## 2018-04-17 PROCEDURE — 85027 COMPLETE CBC AUTOMATED: CPT | Performed by: INTERNAL MEDICINE

## 2018-04-17 PROCEDURE — 82728 ASSAY OF FERRITIN: CPT | Performed by: INTERNAL MEDICINE

## 2018-04-17 PROCEDURE — 83550 IRON BINDING TEST: CPT | Performed by: INTERNAL MEDICINE

## 2018-04-17 PROCEDURE — 99214 OFFICE O/P EST MOD 30 MIN: CPT | Performed by: INTERNAL MEDICINE

## 2018-04-17 PROCEDURE — G0463 HOSPITAL OUTPT CLINIC VISIT: HCPCS

## 2018-04-17 PROCEDURE — 36415 COLL VENOUS BLD VENIPUNCTURE: CPT

## 2018-04-17 PROCEDURE — 83540 ASSAY OF IRON: CPT | Performed by: INTERNAL MEDICINE

## 2018-04-17 ASSESSMENT — PAIN SCALES - GENERAL: PAINLEVEL: EXTREME PAIN (8)

## 2018-04-17 NOTE — LETTER
4/17/2018         RE: Helene Gibbs  [address]   [facility] [address]        Dear Colleague,    Thank you for referring your patient, Helene Gibbs, to the [facility]. Please see a copy of my visit note below.    Medical Assistant Note:  Helene Gibbs presents today for lab only.    Patient seen by provider today: Yes.   present during visit today: Not Applicable.    Concerns: No Concerns.    Procedure:  Lab draw site: RAC, Needle type: BF, Gauge: 21.    Post Assessment:  Labs drawn without difficulty: Yes.    Discharge Plan:  Departure Mode: Ambulatory.    Face to Face Time: 5 minutes.    Aura Bright MA              Again, thank you for allowing me to participate in the care of your patient.        Sincerely,        Oncology Nurse

## 2018-04-17 NOTE — PATIENT INSTRUCTIONS
IV injectafer x 2 doses.  Scheduled/vernon  Follow up in 1 month with labs. Scheduled/vernon    AVS given to patient/vernon

## 2018-04-17 NOTE — PROGRESS NOTES
Medical Assistant Note:  Helene Gibbs presents today for lab only.    Patient seen by provider today: Yes.   present during visit today: Not Applicable.    Concerns: No Concerns.    Procedure:  Lab draw site: RAC, Needle type: BF, Gauge: 21.    Post Assessment:  Labs drawn without difficulty: Yes.    Discharge Plan:  Departure Mode: Ambulatory.    Face to Face Time: 5 minutes.    Aura Bright MA

## 2018-04-17 NOTE — Clinical Note
"    4/17/2018         RE: Helene Gibbs  [address]   [facility] [address]        Dear Colleague,    Thank you for referring your patient, Helene Gibbs, to the [facility]. Please see a copy of my visit note below.    Oncology Rooming Note    April 17, 2018 1:48 PM   Helene Gibbs is a 83 year old female who presents for:    Chief Complaint   Patient presents with     Oncology Clinic Visit     Iron deficiency     Initial Vitals: /62 (BP Location: Left arm, Patient Position: Sitting, Cuff Size: Adult Large)  Pulse 84  Resp 16  Wt 80.8 kg (178 lb 3.2 oz)  LMP  (LMP Unknown)  SpO2 96%  BMI 28.76 kg/m2 Estimated body mass index is 28.76 kg/(m^2) as calculated from the following:    Height as of 4/16/18: 1.676 m (5' 6\").    Weight as of this encounter: 80.8 kg (178 lb 3.2 oz). Body surface area is 1.94 meters squared.  Extreme Pain (8) Comment: Data Unavailable   No LMP recorded (lmp unknown). Patient is postmenopausal.  Allergies reviewed: Yes  Medications reviewed: Yes    Medications: Medication refills not needed today.  Pharmacy name entered into [facility]:    [facility] [facility] - [place], [place] - [address] [facility]  [id number] [place], [place] [facility] [address]  [id number] - [place] [address] [facility] - [place], [place] [address]  [place] [facility] - [place], [place] - [address]    Clinical concerns: None                       4 minutes for nursing intake (face to face time)     Radha Sadler MA              Visit Date:   04/17/2018     HEMATOLOGY HISTORY: Ms. Helene Gibbs is a female with anemia and MGUS (IgM lamda).   1.  On 09/26/2016, hemoglobin of 15.7. On 04/25/2017, hemoglobin of 11.8 with MCV of 93.   2.  On 08/09/2017:  -WBC of 7.4, hemoglobin of 6.6 and platelet of 299.  MCV of 79.  RDW of 17.1.   -Creatinine of 1.46.    -Normal calcium and " LFT   3.  On 08/10/2017, normal iron, vitamin B12 and folate.   4.  EGD on 08/10/2017 reveals erythematous mucosa in the antrum.  Normal esophagus and duodenum. Biopsy reveals focal chronic inflammation.  No H. pylori.  No malignancy.   5.  Colonoscopy on 10/07/2016 had revealed tiny polyps and diverticulosis.  Otherwise normal.  Pathology revealed tubular adenoma without any high-grade dysplasia.   6. Multiple labs were done on is 08/22/2017.   -Erythropoietin of 138.   -LDH of 188.   -Serum protein electrophoresis reveals M spike of 0.1.   -Immunofixation reveals IgM lambda, IgM level of 197.   7. Bone marrow aspiration biopsy on 08/29/2017 reveals normocellular bone marrow with 20% cellularity.  There is trilineage hematopoietic maturation.  No evidence of dysplasia.  No increase in blasts.  Flow cytometry is normal.  Iron stains revealed decreased storage iron.   8. On 09/08/2017, ferritin of 6 and hemoglobin of 8.1.    9. IV Injectafer for 2 doses on 09/15/2017 and 09/22/2017.       SUBJECTIVE:    Ms. Gibbs is an 83-year-old female who follows up in Hematology/Oncology Clinic for anemia and MGUS. Patient has had multiple workups done for anemia.  Her anemia is due to multiple factors including iron deficiency, chronic kidney disease, anemia of chronic disease and hypocellular marrow.  For iron deficiency, she has received IV Injectafer in September 2017 and she has responded to it.      Overall she has not been feeling good.  She has more fatigue.  She also has back pain.  She is going to see her back specialist.  She also has UTI and is on antibiotics.  Because of back pain, her sleep is not good.  She also has neuropathy.  That also causes discomfort.  She is also constipated.      No headache.  No dizziness.  No chest pain.  No shortness of breath.  She gets some abdominal discomfort which is mainly from her constipation.  She has not noticed any bleeding.      Patient previously has been on oral iron.  It  caused abdominal pain and constipation and she does not want it.  She is now taking multivitamins which does have some iron.      Patient has IgM lambda MGUS.  We are monitoring this.  There has been no progression to myeloma.      PHYSICAL EXAMINATION:   GENERAL:  She was alert and oriented x 3.   VITAL SIGNS:  Reviewed.   EYES:  No icterus.   THROAT:  No ulcer.   NECK:  Supple, no lymphadenopathy or thyromegaly.   AXILLAE:  No lymphadenopathy.   LUNGS:  Good air entry bilaterally.  No wheezing.   HEART:  Regular.   ABDOMEN:  Soft, nontender.  Difficult palpation because of her weight.   EXTREMITIES:  Mild edema.   SKIN:  No petechiae.      LABORATORY DATA:  Reviewed.  Hemoglobin has decreased to 9.9.  MCV is normal at 88.  Iron low at 22 with saturation of 6%.  Ferritin of 9.      ASSESSMENT:   1.  An 83-year-old female with normocytic anemia.   2.  Iron deficiency.   3.  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   4.  Back pain   5.  Neuropathy.   6.  Fatigue.      PLAN:   1.  Labs were reviewed with the patient.  I explained to her that she is anemic.  She has iron deficiency.      Discussed regarding iron deficiency.  She has had investigations done.  Cause is not clear.  She is on anticoagulation.  It is likely she may intermittently have some gastrointestinal bleed.      Discussed regarding treatment.  She does not want oral iron because of the side effects of abdominal pain and constipation.  Discussed regarding IV iron.  She has tolerated it before and responded.  We will give her IV Injectafer 2 doses.  Side effects reviewed.      She is on multivitamin with oral iron which she will continue.      2. Patient has MGUS.  In about 4-6 months, we will recheck labs for MGUS.      3. Patient has neuropathy and back pain.  She is scheduled to see a back specialist.        4.  She had a few questions, which were all answered.  Will see her back in a month with hemoglobin and iron studies.          ALEE TIAN MD             D: 2018   T: 2018   MT: DHARA      Name:     PATRICIA BOBO   MRN:      [id number]        Account:      [id number]   :      1935           Visit Date:   2018      Document: [id number]        Again, thank you for allowing me to participate in the care of your patient.        Sincerely,        Alee Tian MD

## 2018-04-17 NOTE — MR AVS SNAPSHOT
After Visit Summary   4/17/2018    Helene Gibbs    MRN: [id number]           Patient Information     Date Of Birth          1935        Visit Information        Provider Department      4/17/2018 1:00 PM Nurse,  Oncology [facility]        Today's Diagnoses     Anemia, unspecified type           Follow-ups after your visit        Your next 10 appointments already scheduled     Apr 17, 2018  2:00 PM CDT   Return Visit with Estiven Cali MD   [facility] ([facility])    [facility]  [address]  [place] [address]   [phone]            Apr 23, 2018  1:20 PM CDT   New Visit with Jasmeet Trujillo MD   [facility] ([facility])    [address]  [address]  [facility] [address]   [phone]            Apr 25, 2018  2:00 PM CDT   Lymphedema Evaluation with Marlen Elizabeth OT   [facility] Lymphedema OT ([facility])    [address]  [address]  [facility] [address]   [phone]            May 01, 2018  1:15 PM CDT   Teletrace with STONE TECH1   [facility] ([facility])    [address]  [facility] [address]   [phone] OPT 2            May 04, 2018 11:00 AM CDT   US EMIGDIO DOPPLER NO EXERCISE 1-2 LVLS BILAT with SHVUS1   [facility] MVI Ultrasound ([facility])    [address]  [facility] [address]   [phone]           Please bring a list of your medicines (including vitamins, minerals and over-the-counter drugs). Also, tell your doctor about any allergies you may have. Wear comfortable clothes and leave your valuables at home.  No caffeine or tobacco for 1 hour prior to exam.  Please call the Imaging Department at your exam site with any questions.            May 04, 2018 11:30 AM CDT   Return Visit with Pieter Hsieh  MD Walter   [facility] ([facility])    [address]  [place] [address] [phone]              Future tests that were ordered for you today     Open Future Orders        Priority Expected Expires Ordered    UA with Microscopic reflex to Culture Routine 4/23/2018 9/28/2018 4/16/2018            Who to contact     If you have questions or need follow up information about today's clinic visit or your schedule please contact [facility] directly at [phone].  Normal or non-critical lab and imaging results will be communicated to you by Free[employer], letter or phone within 4 business days after the clinic has received the results. If you do not hear from us within 7 days, please contact the clinic through Freebe[employer] or phone. If you have a critical or abnormal lab result, we will notify you by phone as soon as possible.  Submit refill requests through [employer] or call your pharmacy and they will forward the refill request to us. Please allow 3 business days for your refill to be completed.          Additional Information About Your Visit        MyChart Information     [employer] gives you secure access to your electronic health record. If you see a primary care provider, you can also send messages to your care team and make appointments. If you have questions, please call your primary care clinic.  If you do not have a primary care provider, please call [phone] and they will assist you.        Care EveryWhere ID     This is your Care EveryWhere ID. This could be used by other organizations to access your [place] medical records  [id number]        Your Vitals Were     Last Period                   (LMP Unknown)            Blood Pressure from Last 3 Encounters:   04/16/18 133/71   04/11/18 158/84   04/06/18 121/79    Weight from Last 3 Encounters:   04/16/18 79.4 kg (175 lb)   04/11/18 76.2 kg (168 lb)   04/06/18 77.1 kg  (170 lb)              We Performed the Following     CBC with platelets     Ferritin     Iron and iron binding capacity        Primary Care Provider Office Phone # Fax #    Neisha Esparza [phone] [address]  [place]                [place] [address]        Goals        General    start date: 4/17/14 I will weigh myself daily and if any weight gain or shortness of breath I will call the clinic. (pt-stated)     Notes - Note created  4/17/2014  3:59 PM by Margareth Pichardo, RN    As of today's date 4/17/2014 goal is met at 0 - 25%.   Goal Status:  Active        Equal Access to Services     [facility] : Hadii sukhdeep Brady, waaxda luvenessaadaha, qaybta kaalmada maddy, pamela mccullough . So [facility] [phone].    ATENCIÓN: Si habla español, tiene a gupta disposición servicios gratuitos de asistencia lingüística. Llame al [phone].    We comply with applicable federal civil rights laws and [place] laws. We do not discriminate on the basis of race, color, national origin, age, disability, sex, sexual orientation, or gender identity.            Thank you!     Thank you for choosing [facility]  for your care. Our goal is always to provide you with excellent care. Hearing back from our patients is one way we can continue to improve our services. Please take a few minutes to complete the written survey that you may receive in the mail after your visit with us. Thank you!             Your Updated Medication List - Protect others around you: Learn how to safely use, store and throw away your medicines at www.disposemymeds.org.          This list is accurate as of 4/17/18  1:13 PM.  Always use your most recent med list.                   Brand Name Dispense Instructions for use Diagnosis    albuterol 108 (90 Base) MCG/ACT Inhaler    PROAIR HFA/PROVENTIL HFA/VENTOLIN HFA    1 Inhaler    Inhale 2 puffs into the lungs every 4 hours as needed for  shortness of breath / dyspnea or wheezing    COPD (chronic obstructive pulmonary disease) (H)       apixaban ANTICOAGULANT 2.5 MG tablet    ELIQUIS    180 tablet    Take 1 tablet (2.5 mg) by mouth 2 times daily    Atrial fibrillation, unspecified type (H)       BETA BLOCKER NOT PRESCRIBED (INTENTIONAL)      Beta Blocker not prescribed intentionally due to Other: due to ablation procedure        betamethasone valerate 0.1 % cream    VALISONE     Apply topically 2 times daily as needed (Use Sparingly)        ciprofloxacin 250 MG tablet    CIPRO    14 tablet    Take 1 tablet (250 mg) by mouth 2 times daily for 7 days    UTI (urinary tract infection), uncomplicated       fluticasone 50 MCG/ACT spray    FLONASE     Spray 1-2 sprays into both nostrils every evening        furosemide 20 MG tablet    LASIX    180 tablet    TAKE 1-2 TABS BY MOUTH ONCE DAILY. MAY REPEAT AFTER NOON IF NEEDED FOR WEIGHT GAIN/2+ EDEMA    Swelling       GABAPENTIN PO      Take 300 mg by mouth daily        GLUCOTROL PO      Take 5 mg by mouth every morning (before breakfast)        hydrocortisone 2.5 % cream    ANUSOL-HC    30 g    Place rectally 2 times daily    External hemorrhoids       hydrOXYzine 25 MG capsule    VISTARIL    180 capsule    Take 1 capsule (25 mg) by mouth 2 times daily as needed for itching    Itching       lisinopril 10 MG tablet    PRINIVIL/ZESTRIL    270 tablet    Take # 2 tablets every morning and one tablet every evening    Essential hypertension       magnesium hydroxide 400 MG/5ML suspension    MILK OF MAGNESIA     Take 30 mLs by mouth At Bedtime        nystatin cream    MYCOSTATIN     Apply topically 2 times daily as needed for dry skin        ondansetron 4 MG ODT tab    ZOFRAN ODT    30 tablet    Take 1 tablet (4 mg) by mouth every 6 hours as needed for nausea    Nausea       oxyCODONE-acetaminophen 5-325 MG per tablet    PERCOCET    40 tablet    Take 1 tablet by mouth 3 times daily as needed for severe pain maximum 6  tablet(s) per day    Radicular pain, Lumbar spine pain, Pain in thoracic spine       PRESERVISION/LUTEIN Caps      Take 1 capsule by mouth daily        ranitidine 150 MG tablet    ZANTAC    180 tablet    TAKE ONE TABLET BY MOUTH TWO TIMES A DAY    Dyspepsia       simvastatin 10 MG tablet    ZOCOR    90 tablet    TAKE 1 TABLET (10 MG) BY MOUTH AT BEDTIME    Type 2 diabetes mellitus with diabetic nephropathy, without long-term current use of insulin (H)       tiotropium 18 MCG capsule    SPIRIVA     Inhale 18 mcg into the lungs every evening

## 2018-04-17 NOTE — PROGRESS NOTES
"Oncology Rooming Note    April 17, 2018 1:48 PM   Helene Gibbs is a 83 year old female who presents for:    Chief Complaint   Patient presents with     Oncology Clinic Visit     Iron deficiency     Initial Vitals: /62 (BP Location: Left arm, Patient Position: Sitting, Cuff Size: Adult Large)  Pulse 84  Resp 16  Wt 80.8 kg (178 lb 3.2 oz)  LMP  (LMP Unknown)  SpO2 96%  BMI 28.76 kg/m2 Estimated body mass index is 28.76 kg/(m^2) as calculated from the following:    Height as of 4/16/18: 1.676 m (5' 6\").    Weight as of this encounter: 80.8 kg (178 lb 3.2 oz). Body surface area is 1.94 meters squared.  Extreme Pain (8) Comment: Data Unavailable   No LMP recorded (lmp unknown). Patient is postmenopausal.  Allergies reviewed: Yes  Medications reviewed: Yes    Medications: Medication refills not needed today.  Pharmacy name entered into [facility]:    [facility] [facility] - [place], [place] - [address] [id number] [place] [facility] [address]  [id number] - [place], [place] - [address] [facility] - [place], [place] - [address]  [facility] - [place], [place] - [address]    Clinical concerns: None                       4 minutes for nursing intake (face to face time)     Radha Sadler MA            "

## 2018-04-17 NOTE — MR AVS SNAPSHOT
After Visit Summary   4/17/2018    Helene Gibbs    MRN: [id number]           Patient Information     Date Of Birth          1935        Visit Information        Provider Department      4/17/2018 2:00 PM Estiven Cali MD [facility]        Today's Diagnoses     Iron deficiency anemia, unspecified iron deficiency anemia type    -  1    Adverse effect of iron, subsequent encounter          Care Instructions    IV injectafer x 2 doses.  Follow up in 1 month with labs.          Follow-ups after your visit        Your next 10 appointments already scheduled     Apr 20, 2018  3:00 PM CDT   Level 1 with  INFUSION CHAIR 4   [facility] ([facility] [place])    [facility]  [address]  [place] [address]   [phone]            Apr 23, 2018  1:20 PM CDT   New Visit with Jasmeet Trujillo MD   [facility] ([facility] [place])    [address]  [address]  [place] [address]   [phone]            Apr 25, 2018  2:00 PM CDT   Lymphedema Evaluation with Marlen Elizabeth OT   [facility] ([facility])    [address]  [address]  [place] [address]   [phone]            Apr 27, 2018  1:30 PM CDT   Level 1 with  INFUSION CHAIR 5   [facility] ([facility] [place])    [facility]  [address]  [place] [address]   [phone]            May 01, 2018  1:15 PM CDT   Teletrace with STONE TECH1   [facility]   [place] ([facility])    [address]  [place] [address]   [phone] OPT 2            May 04, 2018 11:00 AM CDT   US EMIGDIO DOPPLER NO EXERCISE 1-2 LVLS BILAT with SHVUS1   [facility] ([facility] [place])    [address]  [place]  [address]   [phone]           Please bring a list of your medicines (including vitamins, minerals and over-the-counter drugs). Also, tell your doctor about any allergies you may have. Wear comfortable clothes and leave your valuables at home.  No caffeine or tobacco for 1 hour prior to exam.  Please call the Imaging Department at your exam site with any questions.            May 04, 2018 11:30 AM CDT   Return Visit with Pieter Watson MD   [facility] ([facility])    [address]  [place] [address]   [phone]            May 16, 2018  1:15 PM CDT   Return Visit with  Oncology Nurse   [facility] ([facility])    [facility]  [address]  [place] [address]   [phone]            May 16, 2018  2:20 PM CDT   Return Visit with Estiven Cali MD   [facility] ([facility])    [facility]  [address]  [place] [address] [phone]              Future tests that were ordered for you today     Open Future Orders        Priority Expected Expires Ordered    CBC with platelets Routine 5/17/2018 7/17/2018 4/17/2018    Ferritin Routine 5/17/2018 7/17/2018 4/17/2018    Iron and iron binding capacity Routine 5/17/2018 7/17/2018 4/17/2018    UA with Microscopic reflex to Culture Routine 4/23/2018 9/28/2018 4/16/2018            Who to contact     If you have questions or need follow up information about today's clinic visit or your schedule please contact [facility] directly at [phone].  Normal or non-critical lab and imaging results will be communicated to you by MyChart, letter or phone within 4 business days after the clinic has received the results. If you do not hear from us within 7 days, please contact the clinic through MyChart or phone. If you have a critical or abnormal lab  result, we will notify you by phone as soon as possible.  Submit refill requests through [employer] or call your pharmacy and they will forward the refill request to us. Please allow 3 business days for your refill to be completed.          Additional Information About Your Visit        [employer] Information     [employer] gives you secure access to your electronic health record. If you see a primary care provider, you can also send messages to your care team and make appointments. If you have questions, please call your primary care clinic.  If you do not have a primary care provider, please call [phone] and they will assist you.        Care EveryWhere ID     This is your Care EveryWhere ID. This could be used by other organizations to access your [facility] medical records  [id number]        Your Vitals Were     Pulse Respirations Last Period Pulse Oximetry BMI (Body Mass Index)       84 16 (LMP Unknown) 96% 28.76 kg/m2        Blood Pressure from Last 3 Encounters:   04/17/18 117/62   04/16/18 133/71   04/11/18 158/84    Weight from Last 3 Encounters:   04/17/18 80.8 kg (178 lb 3.2 oz)   04/16/18 79.4 kg (175 lb)   04/11/18 76.2 kg (168 lb)               Primary Care Provider Office Phone # Fax #    Neisha MINAYA MD [place] [phone] [address] [facility] [address]  [place]                [place] [address]        Goals        General    start date: 4/17/14 I will weigh myself daily and if any weight gain or shortness of breath I will call the clinic. (pt-stated)     Notes - Note created  4/17/2014  3:59 PM by Margareth Pichardo, RN    As of today's date 4/17/2014 goal is met at 0 - 25%.   Goal Status:  Active        Equal Access to Services     SAMRA TUCKER : Yariel Brady, jefe valentino, pamela craft. [facility] [phone].    ATENCIÓN: Si habla español, tiene a gupta disposición servicios gratuitos de asistencia lingüística. Llame al  [phone].    We comply with applicable federal civil rights laws and [place] laws. We do not discriminate on the basis of race, color, national origin, age, disability, sex, sexual orientation, or gender identity.            Thank you!     Thank you for choosing [facility]  for your care. Our goal is always to provide you with excellent care. Hearing back from our patients is one way we can continue to improve our services. Please take a few minutes to complete the written survey that you may receive in the mail after your visit with us. Thank you!             Your Updated Medication List - Protect others around you: Learn how to safely use, store and throw away your medicines at www.disposemymeds.org.          This list is accurate as of 4/17/18  2:27 PM.  Always use your most recent med list.                   Brand Name Dispense Instructions for use Diagnosis    albuterol 108 (90 Base) MCG/ACT Inhaler    PROAIR HFA/PROVENTIL HFA/VENTOLIN HFA    1 Inhaler    Inhale 2 puffs into the lungs every 4 hours as needed for shortness of breath / dyspnea or wheezing    COPD (chronic obstructive pulmonary disease) (H)       apixaban ANTICOAGULANT 2.5 MG tablet    ELIQUIS    180 tablet    Take 1 tablet (2.5 mg) by mouth 2 times daily    Atrial fibrillation, unspecified type (H)       BETA BLOCKER NOT PRESCRIBED (INTENTIONAL)      Beta Blocker not prescribed intentionally due to Other: due to ablation procedure        betamethasone valerate 0.1 % cream    VALISONE     Apply topically 2 times daily as needed (Use Sparingly)        ciprofloxacin 250 MG tablet    CIPRO    14 tablet    Take 1 tablet (250 mg) by mouth 2 times daily for 7 days    UTI (urinary tract infection), uncomplicated       fluticasone 50 MCG/ACT spray    FLONASE     Spray 1-2 sprays into both nostrils every evening        furosemide 20 MG tablet    LASIX    180 tablet    TAKE 1-2 TABS BY MOUTH ONCE DAILY. MAY REPEAT AFTER NOON IF NEEDED FOR  WEIGHT GAIN/2+ EDEMA    Swelling       GABAPENTIN PO      Take 300 mg by mouth daily        GLUCOTROL PO      Take 5 mg by mouth every morning (before breakfast)        hydrocortisone 2.5 % cream    ANUSOL-HC    30 g    Place rectally 2 times daily    External hemorrhoids       hydrOXYzine 25 MG capsule    VISTARIL    180 capsule    Take 1 capsule (25 mg) by mouth 2 times daily as needed for itching    Itching       lisinopril 10 MG tablet    PRINIVIL/ZESTRIL    270 tablet    Take # 2 tablets every morning and one tablet every evening    Essential hypertension       magnesium hydroxide 400 MG/5ML suspension    MILK OF MAGNESIA     Take 30 mLs by mouth At Bedtime        nystatin cream    MYCOSTATIN     Apply topically 2 times daily as needed for dry skin        ondansetron 4 MG ODT tab    ZOFRAN ODT    30 tablet    Take 1 tablet (4 mg) by mouth every 6 hours as needed for nausea    Nausea       oxyCODONE-acetaminophen 5-325 MG per tablet    PERCOCET    40 tablet    Take 1 tablet by mouth 3 times daily as needed for severe pain maximum 6 tablet(s) per day    Radicular pain, Lumbar spine pain, Pain in thoracic spine       PRESERVISION/LUTEIN Caps      Take 1 capsule by mouth daily        ranitidine 150 MG tablet    ZANTAC    180 tablet    TAKE ONE TABLET BY MOUTH TWO TIMES A DAY    Dyspepsia       simvastatin 10 MG tablet    ZOCOR    90 tablet    TAKE 1 TABLET (10 MG) BY MOUTH AT BEDTIME    Type 2 diabetes mellitus with diabetic nephropathy, without long-term current use of insulin (H)       tiotropium 18 MCG capsule    SPIRIVA     Inhale 18 mcg into the lungs every evening

## 2018-04-18 LAB
BACTERIA SPEC CULT: ABNORMAL
SPECIMEN SOURCE: ABNORMAL

## 2018-04-18 NOTE — PROGRESS NOTES
Notify patient that urine culture is growing E.coli.  Cipro will work.  Take Cipro as prescribed

## 2018-04-18 NOTE — PROGRESS NOTES
Visit Date:   04/17/2018     HEMATOLOGY HISTORY: Ms. Helene Gibbs is a female with anemia and MGUS (IgM lamda).   1.  On 09/26/2016, hemoglobin of 15.7. On 04/25/2017, hemoglobin of 11.8 with MCV of 93.   2.  On 08/09/2017:  -WBC of 7.4, hemoglobin of 6.6 and platelet of 299.  MCV of 79.  RDW of 17.1.   -Creatinine of 1.46.    -Normal calcium and LFT   3.  On 08/10/2017, normal iron, vitamin B12 and folate.   4.  EGD on 08/10/2017 reveals erythematous mucosa in the antrum.  Normal esophagus and duodenum. Biopsy reveals focal chronic inflammation.  No H. pylori.  No malignancy.   5.  Colonoscopy on 10/07/2016 had revealed tiny polyps and diverticulosis.  Otherwise normal.  Pathology revealed tubular adenoma without any high-grade dysplasia.   6. Multiple labs were done on is 08/22/2017.   -Erythropoietin of 138.   -LDH of 188.   -Serum protein electrophoresis reveals M spike of 0.1.   -Immunofixation reveals IgM lambda, IgM level of 197.   7. Bone marrow aspiration biopsy on 08/29/2017 reveals normocellular bone marrow with 20% cellularity.  There is trilineage hematopoietic maturation.  No evidence of dysplasia.  No increase in blasts.  Flow cytometry is normal.  Iron stains revealed decreased storage iron.   8. On 09/08/2017, ferritin of 6 and hemoglobin of 8.1.    9. IV Injectafer for 2 doses on 09/15/2017 and 09/22/2017.       SUBJECTIVE:    Ms. Gibbs is an 83-year-old female who follows up in Hematology/Oncology Clinic for anemia and MGUS. Patient has had multiple workups done for anemia.  Her anemia is due to multiple factors including iron deficiency, chronic kidney disease, anemia of chronic disease and hypocellular marrow.  For iron deficiency, she has received IV Injectafer in September 2017 and she has responded to it.      Overall she has not been feeling good.  She has more fatigue.  She also has back pain.  She is going to see her back specialist.  She also has UTI and is on antibiotics.  Because of  back pain, her sleep is not good.  She also has neuropathy.  That also causes discomfort.  She is also constipated.      No headache.  No dizziness.  No chest pain.  No shortness of breath.  She gets some abdominal discomfort which is mainly from her constipation.  She has not noticed any bleeding.      Patient previously has been on oral iron.  It caused abdominal pain and constipation and she does not want it.  She is now taking multivitamins which does have some iron.      Patient has IgM lambda MGUS.  We are monitoring this.  There has been no progression to myeloma.      PHYSICAL EXAMINATION:   GENERAL:  She was alert and oriented x 3.   VITAL SIGNS:  Reviewed.   EYES:  No icterus.   THROAT:  No ulcer.   NECK:  Supple, no lymphadenopathy or thyromegaly.   AXILLAE:  No lymphadenopathy.   LUNGS:  Good air entry bilaterally.  No wheezing.   HEART:  Regular.   ABDOMEN:  Soft, nontender.  Difficult palpation because of her weight.   EXTREMITIES:  Mild edema.   SKIN:  No petechiae.      LABORATORY DATA:  Reviewed.  Hemoglobin has decreased to 9.9.  MCV is normal at 88.  Iron low at 22 with saturation of 6%.  Ferritin of 9.      ASSESSMENT:   1.  An 83-year-old female with normocytic anemia.   2.  Iron deficiency.   3.  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   4.  Back pain   5.  Neuropathy.   6.  Fatigue.      PLAN:   1.  Labs were reviewed with the patient.  I explained to her that she is anemic.  She has iron deficiency.      Discussed regarding iron deficiency.  She has had investigations done.  Cause is not clear.  She is on anticoagulation.  It is likely she may intermittently have some gastrointestinal bleed.      Discussed regarding treatment.  She does not want oral iron because of the side effects of abdominal pain and constipation.  Discussed regarding IV iron.  She has tolerated it before and responded.  We will give her IV Injectafer 2 doses.  Side effects reviewed.      She is  on multivitamin with oral iron which she will continue.      2. Patient has MGUS.  In about 4-6 months, we will recheck labs for MGUS.      3. Patient has neuropathy and back pain.  She is scheduled to see a back specialist.        4.  She had a few questions, which were all answered.  Will see her back in a month with hemoglobin and iron studies.         ALEE TIAN MD             D: 2018   T: 2018   MT: DHARA      Name:     PATRICIA BOBO   MRN:      [id number]        Account:      [id number]   :      1935           Visit Date:   2018      Document: [id number]

## 2018-04-20 ENCOUNTER — INFUSION THERAPY VISIT (OUTPATIENT)
Dept: INFUSION THERAPY | Facility: CLINIC | Age: 83
End: 2018-04-20
Attending: INTERNAL MEDICINE
Payer: MEDICARE

## 2018-04-20 VITALS
HEART RATE: 77 BPM | OXYGEN SATURATION: 93 % | TEMPERATURE: 98.5 F | RESPIRATION RATE: 20 BRPM | DIASTOLIC BLOOD PRESSURE: 57 MMHG | SYSTOLIC BLOOD PRESSURE: 116 MMHG

## 2018-04-20 DIAGNOSIS — E61.1 IRON DEFICIENCY: Primary | ICD-10-CM

## 2018-04-20 DIAGNOSIS — D64.9 ANEMIA, UNSPECIFIED TYPE: ICD-10-CM

## 2018-04-20 DIAGNOSIS — T45.4X5D ADVERSE EFFECT OF IRON, SUBSEQUENT ENCOUNTER: ICD-10-CM

## 2018-04-20 PROCEDURE — 25000128 H RX IP 250 OP 636: Performed by: INTERNAL MEDICINE

## 2018-04-20 PROCEDURE — 96365 THER/PROPH/DIAG IV INF INIT: CPT

## 2018-04-20 RX ADMIN — FERRIC CARBOXYMALTOSE INJECTION 750 MG: 50 INJECTION, SOLUTION INTRAVENOUS at 15:10

## 2018-04-20 RX ADMIN — SODIUM CHLORIDE 250 ML: 9 INJECTION, SOLUTION INTRAVENOUS at 15:10

## 2018-04-20 ASSESSMENT — PAIN SCALES - GENERAL: PAINLEVEL: NO PAIN (0)

## 2018-04-20 NOTE — PROGRESS NOTES
Infusion Nursing Note:  Helene JENIFER Gibbs presents today for Injectafer.    Patient seen by provider today: No   present during visit today: Not Applicable.    Note: N/A.    Intravenous Access:  Peripheral IV placed.    Treatment Conditions:  Not Applicable.      Post Infusion Assessment:  Patient tolerated infusion without incident.  Patient observed for 30 minutes post Injectafer per protocol.  Blood return noted pre and post infusion.  Site patent and intact, free from redness, edema or discomfort.  No evidence of extravasations.  Access discontinued per protocol.    Discharge Plan:   Patient declined prescription refills.  Discharge instructions reviewed with: Patient.  Patient verbalized understanding of discharge instructions and all questions answered.  AVS to patient via [employer].  Patient will return 4/27/18 for next appointment.   Patient discharged in stable condition accompanied by: self.  Departure Mode: Ambulatory.    Tati Feng RN

## 2018-04-20 NOTE — MR AVS SNAPSHOT
After Visit Summary   4/20/2018    Helene Gibbs    MRN: [id number]           Patient Information     Date Of Birth          1935        Visit Information        Provider Department      4/20/2018 3:00 PM  INFUSION CHAIR 4 [facility]        Today's Diagnoses     Iron deficiency    -  1    Adverse effect of iron, subsequent encounter        Anemia, unspecified type           Follow-ups after your visit        Your next 10 appointments already scheduled     Apr 23, 2018  1:30 PM CDT   New Visit with Minal Higgins PA-C   [facility] ([facility])    [address]  [address]  [facility] [address]   [phone]            Apr 25, 2018  2:00 PM CDT   Lymphedema Evaluation with Marlen Elizabeth OT   [facility] Lymphedema OT ([facility])    [address]  [address]  [place] [address]   [phone]            Apr 27, 2018  1:30 PM CDT   Level 1 with  INFUSION CHAIR 5   [facility] ([facility])    [facility]  [address]  [facility] [address]   [phone]            May 01, 2018  1:15 PM CDT   Teletrace with STONE TECH1   [facility] ([facility])    [address]  [place] [facility] [address]   [phone] OPT 2            May 04, 2018 11:00 AM CDT   US EMIGDIO DOPPLER NO EXERCISE 1-2 LVLS BILAT with SHVUS1   [facility] MVI Ultrasound ([facility])    [address]  [facility] [place] [facility] [address]   [phone]           Please bring a list of your medicines (including vitamins, minerals and over-the-counter drugs). Also, tell your doctor about any allergies you may have. Wear comfortable clothes and leave your valuables at home.  No caffeine or tobacco for 1 hour prior to exam.  Please call the Imaging  Department at your exam site with any questions.            May 04, 2018 11:30 AM CDT   Return Visit with Pieter Watson MD   [facility] ([facility])    [address]  [place] [address]   [phone]            May 16, 2018  1:15 PM CDT   Return Visit with  Oncology Nurse   [facility] ([facility])    [facility]  [address]  [place] [address] [phone]            May 16, 2018  2:20 PM CDT   Return Visit with Estiven Cali MD   [facility] ([facility])    [facility]  [address]  [place] [address] [phone]              Who to contact     If you have questions or need follow up information about today's clinic visit or your schedule please contact [facility] directly at [phone].  Normal or non-critical lab and imaging results will be communicated to you by idiohart, letter or phone within 4 business days after the clinic has received the results. If you do not hear from us within 7 days, please contact the clinic through Prezacort or phone. If you have a critical or abnormal lab result, we will notify you by phone as soon as possible.  Submit refill requests through CelePost or call your pharmacy and they will forward the refill request to us. Please allow 3 business days for your refill to be completed.          Additional Information About Your Visit        idiohart Information     CelePost gives you secure access to your electronic health record. If you see a primary care provider, you can also send messages to your care team and make appointments. If you have questions, please call your primary care clinic.  If you do not have a primary care provider, please call [phone] and they will assist you.        Care EveryWhere ID     This is your  Care EveryWhere ID. This could be used by other organizations to access your [facility] medical records  [id number]        Your Vitals Were     Pulse Temperature Respirations Last Period Pulse Oximetry       77 98.5  F (36.9  C) (Oral) 20 (LMP Unknown) 93%        Blood Pressure from Last 3 Encounters:   04/20/18 116/57   04/17/18 117/62   04/16/18 133/71    Weight from Last 3 Encounters:   04/17/18 80.8 kg (178 lb 3.2 oz)   04/16/18 79.4 kg (175 lb)   04/11/18 76.2 kg (168 lb)              Today, you had the following     No orders found for display       Primary Care Provider Office Phone # Fax #    Neisha MARIMAR Esparza [phone] [address]  [place]                [place] [address]        Goals        General    start date: 4/17/14 I will weigh myself daily and if any weight gain or shortness of breath I will call the clinic. (pt-stated)     Notes - Note created  4/17/2014  3:59 PM by Margareth Pichardo, RN    As of today's date 4/17/2014 goal is met at 0 - 25%.   Goal Status:  Active        Equal Access to Services     TONG TUCKER AH: Hadii aad ku hadbeatriceo Caitlyn, waaxda luqadaha, qaybta kaalmada adeegyada, pamela mccullough . So [facility] [phone].    ATENCIÓN: Si habla español, tiene a gupta disposición servicios gratuitos de asistencia lingüística. Llame al [phone].    We comply with applicable federal civil rights laws and [place] laws. We do not discriminate on the basis of race, color, national origin, age, disability, sex, sexual orientation, or gender identity.            Thank you!     Thank you for choosing [facility]  for your care. Our goal is always to provide you with excellent care. Hearing back from our patients is one way we can continue to improve our services. Please take a few minutes to complete the written survey that you may receive in the mail after your visit with us. Thank you!             Your Updated  Medication List - Protect others around you: Learn how to safely use, store and throw away your medicines at www.disposemymeds.org.          This list is accurate as of 4/20/18  4:12 PM.  Always use your most recent med list.                   Brand Name Dispense Instructions for use Diagnosis    albuterol 108 (90 Base) MCG/ACT Inhaler    PROAIR HFA/PROVENTIL HFA/VENTOLIN HFA    1 Inhaler    Inhale 2 puffs into the lungs every 4 hours as needed for shortness of breath / dyspnea or wheezing    COPD (chronic obstructive pulmonary disease) (H)       apixaban ANTICOAGULANT 2.5 MG tablet    ELIQUIS    180 tablet    Take 1 tablet (2.5 mg) by mouth 2 times daily    Atrial fibrillation, unspecified type (H)       BETA BLOCKER NOT PRESCRIBED (INTENTIONAL)      Beta Blocker not prescribed intentionally due to Other: due to ablation procedure        betamethasone valerate 0.1 % cream    VALISONE     Apply topically 2 times daily as needed (Use Sparingly)        ciprofloxacin 250 MG tablet    CIPRO    14 tablet    Take 1 tablet (250 mg) by mouth 2 times daily for 7 days    UTI (urinary tract infection), uncomplicated       fluticasone 50 MCG/ACT spray    FLONASE     Spray 1-2 sprays into both nostrils every evening        furosemide 20 MG tablet    LASIX    180 tablet    TAKE 1-2 TABS BY MOUTH ONCE DAILY. MAY REPEAT AFTER NOON IF NEEDED FOR WEIGHT GAIN/2+ EDEMA    Swelling       GABAPENTIN PO      Take 300 mg by mouth daily        GLUCOTROL PO      Take 5 mg by mouth every morning (before breakfast)        hydrocortisone 2.5 % cream    ANUSOL-HC    30 g    Place rectally 2 times daily    External hemorrhoids       hydrOXYzine 25 MG capsule    VISTARIL    180 capsule    Take 1 capsule (25 mg) by mouth 2 times daily as needed for itching    Itching       lisinopril 10 MG tablet    PRINIVIL/ZESTRIL    270 tablet    Take # 2 tablets every morning and one tablet every evening    Essential hypertension       magnesium hydroxide 400  MG/5ML suspension    MILK OF MAGNESIA     Take 30 mLs by mouth At Bedtime        nystatin cream    MYCOSTATIN     Apply topically 2 times daily as needed for dry skin        ondansetron 4 MG ODT tab    ZOFRAN ODT    30 tablet    Take 1 tablet (4 mg) by mouth every 6 hours as needed for nausea    Nausea       oxyCODONE-acetaminophen 5-325 MG per tablet    PERCOCET    40 tablet    Take 1 tablet by mouth 3 times daily as needed for severe pain maximum 6 tablet(s) per day    Radicular pain, Lumbar spine pain, Pain in thoracic spine       PRESERVISION/LUTEIN Caps      Take 1 capsule by mouth daily        ranitidine 150 MG tablet    ZANTAC    180 tablet    TAKE ONE TABLET BY MOUTH TWO TIMES A DAY    Dyspepsia       simvastatin 10 MG tablet    ZOCOR    90 tablet    TAKE 1 TABLET (10 MG) BY MOUTH AT BEDTIME    Type 2 diabetes mellitus with diabetic nephropathy, without long-term current use of insulin (H)       tiotropium 18 MCG capsule    SPIRIVA     Inhale 18 mcg into the lungs every evening

## 2018-04-23 ENCOUNTER — OFFICE VISIT (OUTPATIENT)
Dept: NEUROSURGERY | Facility: CLINIC | Age: 83
End: 2018-04-23
Attending: PHYSICIAN ASSISTANT
Payer: MEDICARE

## 2018-04-23 VITALS
OXYGEN SATURATION: 93 % | HEART RATE: 216 BPM | DIASTOLIC BLOOD PRESSURE: 80 MMHG | TEMPERATURE: 97.3 F | SYSTOLIC BLOOD PRESSURE: 166 MMHG

## 2018-04-23 DIAGNOSIS — M54.14 THORACIC RADICULOPATHY: Primary | ICD-10-CM

## 2018-04-23 PROCEDURE — G0463 HOSPITAL OUTPT CLINIC VISIT: HCPCS | Performed by: NEUROLOGICAL SURGERY

## 2018-04-23 PROCEDURE — 99203 OFFICE O/P NEW LOW 30 MIN: CPT | Performed by: PHYSICIAN ASSISTANT

## 2018-04-23 ASSESSMENT — PAIN SCALES - GENERAL: PAINLEVEL: SEVERE PAIN (7)

## 2018-04-23 NOTE — PROGRESS NOTES
Dr. Jasmeet Trujillo  [facility]  Neurosurgery Clinic Visit      CC: Mid back pain    Primary care Provider: Neisha Esparza      Reason For Visit:   I was asked by Dr. Esparza to consult on the patient for upper back pain and chronic bilateral low back pain with sciatica.      HPI: Helene Gibbs is a 83 year old female who presents for evaluation of chronic mid back pain x 3-4 months. Pain is located in thoracic region and radiates to the right flank. Describes the pain as an intermittent ache. Pain is worsened with any straining type movement or twisting to the right. Pain is alleviated with heating pad and percocet. She was seen in the ED a few weeks ago for her flank pain with no acute abnormalities found. Finding of lung nodule which she is following up with PCP for. She does have history of shingles for which she is taking gabapentin. No recent MRI, PT, or injections. She was scheduled for MBB last December but was unable to complete this due to being unable to lie on her stomach.     Current pain: 0/10 At worst: 8/10    Past Medical History:   Diagnosis Date     Anemia      Ankle arthritis      Arthritis      Back pain      Bell's palsy 9/08    left     Breast CA (H) 2004-    left lumpectomy, radiation, -recurrence     Colon polyps     colonoscopy-9/12-next due in 9/13     Congestive heart failure (H)      COPD (chronic obstructive pulmonary disease) (H)      Coronary artery disease      DM (diabetes mellitus) (H)      GERD (gastroesophageal reflux disease)      Hyperlipidemia      Hypertension      Lumbar spinal stenosis     use cane     Obesity      Osteoporosis      Other chronic pain      Pacemaker      Permanent atrial fibrillation (H) 8/2008    S/P AV node ablation and pacemaker 10-25-12       Pleural effusion      Pulmonary hypertension      Rectal stenosis      Tobacco abuse     current     Tricuspid regurgitation        Past Medical History reviewed with patient during  visit.    Past Surgical History:   Procedure Laterality Date     ARTHROSCOPY SHOULDER RT/LT  1999, 2004    Bilateral, right then left     BONE MARROW BIOPSY, BONE SPECIMEN, NEEDLE/TROCAR N/A 8/29/2017    Procedure: BIOPSY BONE MARROW;  BONE MARROW BIOPSY;  Surgeon: Marino Cohen MD;  Location:  GI     C DEXA, BONE DENSITY, AXIAL SKEL       C MAMMOGRAM, SCREENING  1/2009     COLONOSCOPY N/A 10/7/2016    Procedure: COMBINED COLONOSCOPY, SINGLE OR MULTIPLE BIOPSY/POLYPECTOMY BY BIOPSY;  Surgeon: Cassandra Mccord MD;  Location:  GI     ESOPHAGOSCOPY, GASTROSCOPY, DUODENOSCOPY (EGD), COMBINED N/A 8/10/2017    Procedure: COMBINED ESOPHAGOSCOPY, GASTROSCOPY, DUODENOSCOPY (EGD), BIOPSY SINGLE OR MULTIPLE;  gastroscopy;  Surgeon: Helene Bustamante MD;  Location:  GI     H ABLATION AV NODE  10/2012     HC COLONOSCOPY THRU STOMA, DIAGNOSTIC  ? 2005     IMPLANT PACEMAKER  10/2012    [place] [id number], [id number]     JOINT REPLACEMTN, KNEE RT/LT      Joint Replacement knee bilateral     LAPAROSCOPIC CHOLECYSTECTOMY WITH CHOLANGIOGRAMS N/A 12/14/2015    Procedure: LAPAROSCOPIC CHOLECYSTECTOMY WITH CHOLANGIOGRAMS;  Surgeon: Ervin Amos MD;  Location:  OR     LUMPECTOMY BREAST      Left-2004     PHACOEMULSIFICATION CLEAR CORNEA WITH STANDARD INTRAOCULAR LENS IMPLANT Left 7/16/2015    Procedure: PHACOEMULSIFICATION CLEAR CORNEA WITH STANDARD INTRAOCULAR LENS IMPLANT;  Surgeon: Sai Obregon MD;  Location: [facility]     PHACOEMULSIFICATION CLEAR CORNEA WITH TORIC INTRAOCULAR LENS IMPLANT Right 5/9/2017    Procedure: PHACOEMULSIFICATION CLEAR CORNEA WITH TORIC INTRAOCULAR LENS IMPLANT;  RIGHT EYE PHACOEMULSIFICATION CLEAR CORNEA WITH TORIC INTRAOCULAR LENS IMPLANT ;  Surgeon: Duc Richmond MD;  Location: [facility]     Past Surgical History reviewed with patient during visit.    Current Outpatient Prescriptions   Medication     albuterol (PROAIR HFA, PROVENTIL HFA, VENTOLIN HFA) 108 (90 BASE) MCG/ACT  inhaler     apixaban ANTICOAGULANT (ELIQUIS) 2.5 MG tablet     BETA BLOCKER NOT PRESCRIBED, INTENTIONAL,     betamethasone valerate (VALISONE) 0.1 % cream     ciprofloxacin (CIPRO) 250 MG tablet     fluticasone (FLONASE) 50 MCG/ACT spray     furosemide (LASIX) 20 MG tablet     GABAPENTIN PO     GlipiZIDE (GLUCOTROL PO)     hydrocortisone (ANUSOL-HC) 2.5 % cream     hydrOXYzine (VISTARIL) 25 MG capsule     lisinopril (PRINIVIL/ZESTRIL) 10 MG tablet     magnesium hydroxide (MILK OF MAGNESIA) 400 MG/5ML suspension     Multiple Vitamins-Minerals (PRESERVISION/LUTEIN) CAPS     nystatin (MYCOSTATIN) cream     ondansetron (ZOFRAN ODT) 4 MG ODT tab     oxyCODONE-acetaminophen (PERCOCET) 5-325 MG per tablet     ranitidine (ZANTAC) 150 MG tablet     simvastatin (ZOCOR) 10 MG tablet     tiotropium (SPIRIVA) 18 MCG capsule     No current facility-administered medications for this visit.        Allergies   Allergen Reactions     Ambien [Zolpidem Tartrate] Visual Disturbance     Hallucinations and fell out of bed at night.     Penicillins Hives     Definity      Caused a syncopal episode.     Sulfa Drugs Itching     Cymbalta Rash     Fluconazole Rash       Social History     Social History     Marital status:      Spouse name: N/A     Number of children: N/A     Years of education: N/A     Social History Main Topics     Smoking status: Current Some Day Smoker     Packs/day: 0.25     Years: 45.00     Types: Cigarettes     Smokeless tobacco: Never Used     Alcohol use No     Drug use: No     Sexual activity: Not Currently     Partners: Female     Other Topics Concern     Caffeine Concern Yes     2 cups/day     Sleep Concern Yes     Stress Concern Yes     Weight Concern Yes     15+ lb weight loss since hospitalization      Special Diet Yes     bland diet r/t cholecystectomy, low salt      Exercise No     Seat Belt Yes     Social History Narrative    ,no childrenPOA- niece-Enedelia Claros-has plans to  quitETOH-1/drink 2-3 /weekExercise-sit down DNR, DNI    Lived in [place] and [place] worked in [employer]       Family History   Problem Relation Age of Onset     Hypertension Mother      DIABETES Mother       at 83 yrs     C.A.D. Father       age 70s     Breast Cancer No family hx of      Colon Cancer No family hx of           ROS: 10 point ROS neg other than the symptoms noted above in the HPI.    Vital Signs: LMP  (LMP Unknown)    Examination:  Constitutional:  Alert, well nourished, NAD.  HEENT: Normocephalic, atraumatic.   Pulmonary:  Without shortness of breath, normal effort.   Lymph: no lymphadenopathy to low back or LE.   Integumentary: Skin is free of rashes or lesions.   Cardiovascular:  No pitting edema of BLE.      Neurological:  Awake  Alert  Oriented x 3  Speech clear  Cranial nerves II - XII grossly intact  PERRL  EOMI  Face symmetric  Tongue midline  Motor exam   Shoulder Abduction:  Right:  5/5   Left:  5/5  Biceps:                      Right:  5/5   Left:  5/5  Triceps:                     Right:  5/5   Left:  5/5  Wrist Extensors:       Right:  5/5   Left:  5/5  Wrist Flexors:           Right:  5/5   Left:  5/5  Intrinsics:                   Right:  5/5   Left:  5/5  Hip Flexor:                Right: 5/5  Left:  5/5  Hip Adductor:             Right:  5/5  Left:  5/5  Hip Abductor:             Right:  5/5  Left:  5/5  Gastroc Soleus:        Right:  5/5  Left:  5/5  Tib/Ant:                      Right:  5/5  Left:  5/5  EHL:                          Right:  5/5  Left:  5/5       Sensation normal to bilateral upper and lower extremities.    Reflexes are 2+ in the patellar and Achilles. There is no clonus. Downgoing Babinski.    Reflexes are 2+ in the brachial radialis and triceps. Negative Naomi sign bilaterally.  Musculoskeletal:  Gait: Able to stand from a seated position. Ambulates with walker.  Thoracic and lumbar examination reveals thoracolumbar scoliotic curvature.  "Tenderness to right paraspinous muscles and in dermatomal pattern to the right flank.    Imaging:   THORACIC SPINE TWO VIEW   4/11/2018 1:29 PM      HISTORY: Radicular thoracic spine pain.     COMPARISON: 9/10/2014      FINDINGS: There is a thoracolumbar scoliosis. Vertebroplasties at T12 and L1. Mild midthoracic vertebral body compression fracture is unchanged. No acute fractures evident. Multilevel degenerative disease and prominent thoracic kyphosis.       IMPRESSION: Multilevel degenerative disease. No acute abnormality.     BRISA MCKEON MD    LUMBAR SPINE TWO - THREE VIEWS   4/11/2018 1:29 PM      HISTORY: Pain of lumbar spine. Radicular pain.     COMPARISON: 5/3/2016      FINDINGS: Previous vertebroplasties at T12 and L1. Extensive  multilevel degenerative disc and facet disease without significant  change in appearance from the previous exam. Convex left thoracolumbar scoliosis. No acute fracture or malalignment.      IMPRESSION: No acute abnormality. Multilevel degenerative disease.     BRISA MCKEON MD    Assessment/Plan:   Helene Gibbs is a 83 year old female who presents for evaluation of chronic mid back pain x 3-4 months. Pain is located in thoracic region and radiates to the right flank. Describes the pain as an intermittent ache. XRs reviewed in office today. She does have reproducible pain over the right flank without any evidence of shingles on exam. I did recommend we obtain a thoracic CT to evaluate for any potential nerve compression in this region, (unable to get MRI due to pacemaker), but she does not wish to have one at this time as she has \"been having too many tests\". I did offer referral to [facility] in [place], which she would like to move forward with at this time to discuss potential conservative pain management options. Advised if she does wish to move forward with the CT to contact our clinic and we will order this for her. Patient voiced understanding and " agreement.          Minal Higgins PA-C  [facility]  [facility]  [address] [place] [address]    Tel [phone]  Pager [phone]

## 2018-04-23 NOTE — PATIENT INSTRUCTIONS
[facility] - [facility] [phone]       Patient to follow up with Primary Care provider regarding elevated blood pressure.

## 2018-04-23 NOTE — NURSING NOTE
"Helene Gibbs is a 83 year old female who presents for:  Chief Complaint   Patient presents with     Neurologic Problem     referral from Dr. Wong for chronic bilateral low back pain with sciatica        Initial Vitals:  LMP  (LMP Unknown) Estimated body mass index is 28.76 kg/(m^2) as calculated from the following:    Height as of 4/16/18: 5' 6\" (1.676 m).    Weight as of 4/17/18: 178 lb 3.2 oz (80.8 kg).. There is no height or weight on file to calculate BSA. BP completed using cuff size: regular  Data Unavailable    Do you feel safe in your environment?  Yes  Do you need any refills today? No    Nursing Comments: referral from Dr. Wong for chronic bilateral low back pain with sciatica.  Patient rates her pain today as 7        Tanya Wolf CMA, AAS      Discharge plan:   See providers dictation   Tanya Wolf CMA, AAS       "

## 2018-04-23 NOTE — MR AVS SNAPSHOT
After Visit Summary   4/23/2018    Helene Gibbs    MRN: [id number]           Patient Information     Date Of Birth          1935        Visit Information        Provider Department      4/23/2018 1:30 PM Minal Higgins PA-C [facility]        Today's Diagnoses     Thoracic radiculopathy    -  1      Care Instructions      [facility] [phone]       Patient to follow up with Primary Care provider regarding elevated blood pressure.          Follow-ups after your visit        Additional Services     PAIN MANAGEMENT REFERRAL       Your provider has referred you to: N: [facility] [phone]   [email or web address]      Please call clinic directly to schedule appointment.    **ANY DIAGNOSTIC TESTS THAT ARE NOT IN EPIC SHOULD BE SENT TO THE PAIN CENTER**    REGARDING OPIOID MEDICATIONS:  The discussion of opioids management, appropriateness of therapy, and dosing will be discussed in patients being seen for evaluation.  The pain management clinics are not long-term prescribing clinics, with transition of prescribing of medications ultimately going back to the referring provider/PCP.  If prescribing is taken over at the pain clinic, it is in actively involved patients whom are appropriate for opioids, urine drug screening is completed, and long-term prescribing plan has been determined.  Therefore, we will not be automatically taking over prescribing at the patient's first visit.  Is this agreeable to you? agrees.     Please be aware that coverage of these services is subject to the terms and limitations of your health insurance plan.  Call member services at your health plan with any benefit or coverage questions.      Please bring the following with you to your appointment:    (1) Any  X-Rays, CTs or MRIs which have been performed.  Contact the facility where they were done to arrange for  prior to your scheduled appointment.    (2) List of current medications   (3) This referral request   (4) Any documents/labs given to you for this referral                  Your next 10 appointments already scheduled     Apr 25, 2018  2:00 PM CDT   Lymphedema Evaluation with Marlen Elizabeth OT   [facility] Lymphedema OT ([facility])    [address]  [address]  [place] [address]   [phone]            Apr 27, 2018  1:30 PM CDT   Level 1 with  INFUSION CHAIR 5   [facility] ([facility])    [facility]  [address]  [facility] [address] [phone]            May 01, 2018  1:15 PM CDT   Teletrace with STONE TECH1   [facility] ([facility])    [address]  [facility] [address] [phone] OPT 2            May 04, 2018 11:00 AM CDT   US EMIGDIO DOPPLER NO EXERCISE 1-2 LVLS BILAT with VUS1   [facility] MVI Ultrasound ([facility])    [address]  [facility] [address]   [phone]           Please bring a list of your medicines (including vitamins, minerals and over-the-counter drugs). Also, tell your doctor about any allergies you may have. Wear comfortable clothes and leave your valuables at home.  No caffeine or tobacco for 1 hour prior to exam.  Please call the Imaging Department at your exam site with any questions.            May 04, 2018 11:30 AM CDT   Return Visit with Pieter Watson MD   [facility] ([facility])    [address]  [facility] [address] [phone]            May 16, 2018  1:15 PM CDT   Return Visit with  Oncology Nurse   [facility] ([facility])    [facility] [place]  [address]  [place] [address] [phone]            May 16, 2018  2:20 PM CDT   Return Visit with Estiven Cali MD   [facility] ([facility])    [facility] [place]  [address]  [place] [address] [phone]              Who to contact     If you have questions or need follow up information about today's clinic visit or your schedule please contact [facility]UROS[facility]ERY CLINIC directly at [phone].  Normal or non-critical lab and imaging results will be communicated to you by MyChart, letter or phone within 4 business days after the clinic has received the results. If you do not hear from us within 7 days, please contact the clinic through ROXIMITYhart or phone. If you have a critical or abnormal lab result, we will notify you by phone as soon as possible.  Submit refill requests through ChoicePass or call your pharmacy and they will forward the refill request to us. Please allow 3 business days for your refill to be completed.          Additional Information About Your Visit        MyChart Information     [employer] gives you secure access to your electronic health record. If you see a primary care provider, you can also send messages to your care team and make appointments. If you have questions, please call your primary care clinic.  If you do not have a primary care provider, please call [phone] and they will assist you.        Care EveryWhere ID     This is your Care EveryWhere ID. This could be used by other organizations to access your [facility] medical records  [id number]        Your Vitals Were     Pulse Temperature Last Period Pulse Oximetry Breastfeeding?       216 97.3  F (36.3  C) (Oral) (LMP Unknown) 93% No        Blood Pressure from Last 3 Encounters:   04/23/18 156/78   04/20/18 116/57   04/17/18 117/62    Weight from Last 3 Encounters:   04/17/18 178 lb  3.2 oz (80.8 kg)   04/16/18 175 lb (79.4 kg)   04/11/18 168 lb (76.2 kg)              We Performed the Following     PAIN MANAGEMENT REFERRAL        Primary Care Provider Office Phone # Fax #    Neisha Esparza [phone] [address]  [place]                [place] [address]        Goals        General    start date: 4/17/14 I will weigh myself daily and if any weight gain or shortness of breath I will call the clinic. (pt-stated)     Notes - Note created  4/17/2014  3:59 PM by Margareth Pichardo, RN    As of today's date 4/17/2014 goal is met at 0 - 25%.   Goal Status:  Active        Equal Access to Services     TONG TUCKER : Hadii sukhdeep Brady, waaxda luqadaha, qaybta kaalmada rachelyada, pamela mccullough . So [facility] [place] [phone].    ATENCIÓN: Si habla español, tiene a gupta disposición servicios gratuitos de asistencia lingüística. Llame al [phone].    We comply with applicable federal civil rights laws and [place] laws. We do not discriminate on the basis of race, color, national origin, age, disability, sex, sexual orientation, or gender identity.            Thank you!     Thank you for choosing [facility]UROS[facility]ERY [facility]  for your care. Our goal is always to provide you with excellent care. Hearing back from our patients is one way we can continue to improve our services. Please take a few minutes to complete the written survey that you may receive in the mail after your visit with us. Thank you!             Your Updated Medication List - Protect others around you: Learn how to safely use, store and throw away your medicines at www.disposemymeds.org.          This list is accurate as of 4/23/18  2:01 PM.  Always use your most recent med list.                   Brand Name Dispense Instructions for use Diagnosis    albuterol 108 (90 Base) MCG/ACT Inhaler    PROAIR HFA/PROVENTIL HFA/VENTOLIN HFA    1 Inhaler    Inhale 2 puffs into the  lungs every 4 hours as needed for shortness of breath / dyspnea or wheezing    COPD (chronic obstructive pulmonary disease) (H)       apixaban ANTICOAGULANT 2.5 MG tablet    ELIQUIS    180 tablet    Take 1 tablet (2.5 mg) by mouth 2 times daily    Atrial fibrillation, unspecified type (H)       BETA BLOCKER NOT PRESCRIBED (INTENTIONAL)      Beta Blocker not prescribed intentionally due to Other: due to ablation procedure        betamethasone valerate 0.1 % cream    VALISONE     Apply topically 2 times daily as needed (Use Sparingly)        ciprofloxacin 250 MG tablet    CIPRO    14 tablet    Take 1 tablet (250 mg) by mouth 2 times daily for 7 days    UTI (urinary tract infection), uncomplicated       fluticasone 50 MCG/ACT spray    FLONASE     Spray 1-2 sprays into both nostrils every evening        furosemide 20 MG tablet    LASIX    180 tablet    TAKE 1-2 TABS BY MOUTH ONCE DAILY. MAY REPEAT AFTER NOON IF NEEDED FOR WEIGHT GAIN/2+ EDEMA    Swelling       GABAPENTIN PO      Take 300 mg by mouth daily        GLUCOTROL PO      Take 5 mg by mouth every morning (before breakfast)        hydrocortisone 2.5 % cream    ANUSOL-HC    30 g    Place rectally 2 times daily    External hemorrhoids       hydrOXYzine 25 MG capsule    VISTARIL    180 capsule    Take 1 capsule (25 mg) by mouth 2 times daily as needed for itching    Itching       lisinopril 10 MG tablet    PRINIVIL/ZESTRIL    270 tablet    Take # 2 tablets every morning and one tablet every evening    Essential hypertension       magnesium hydroxide 400 MG/5ML suspension    MILK OF MAGNESIA     Take 30 mLs by mouth At Bedtime        nystatin cream    MYCOSTATIN     Apply topically 2 times daily as needed for dry skin        ondansetron 4 MG ODT tab    ZOFRAN ODT    30 tablet    Take 1 tablet (4 mg) by mouth every 6 hours as needed for nausea    Nausea       oxyCODONE-acetaminophen 5-325 MG per tablet    PERCOCET    40 tablet    Take 1 tablet by mouth 3 times daily  as needed for severe pain maximum 6 tablet(s) per day    Radicular pain, Lumbar spine pain, Pain in thoracic spine       PRESERVISION/LUTEIN Caps      Take 1 capsule by mouth daily        ranitidine 150 MG tablet    ZANTAC    180 tablet    TAKE ONE TABLET BY MOUTH TWO TIMES A DAY    Dyspepsia       simvastatin 10 MG tablet    ZOCOR    90 tablet    TAKE 1 TABLET (10 MG) BY MOUTH AT BEDTIME    Type 2 diabetes mellitus with diabetic nephropathy, without long-term current use of insulin (H)       tiotropium 18 MCG capsule    SPIRIVA     Inhale 18 mcg into the lungs every evening

## 2018-04-23 NOTE — LETTER
4/23/2018         RE: Helene Gibbs  [address]   [facility] [address]        Dear Colleague,    Thank you for referring your patient, Helene Gibbs, to the [facility]. Please see a copy of my visit note below.    Dr. Jasmeet Trujillo  [facility]  Neurosurgery Clinic Visit      CC: Mid back pain    Primary care Provider: Neisha Esparza      Reason For Visit:   I was asked by Dr. Esparza to consult on the patient for upper back pain and chronic bilateral low back pain with sciatica.      HPI: Helene Gibbs is a 83 year old female who presents for evaluation of chronic mid back pain x 3-4 months. Pain is located in thoracic region and radiates to the right flank. Describes the pain as an intermittent ache. Pain is worsened with any straining type movement or twisting to the right. Pain is alleviated with heating pad and percocet. She was seen in the ED a few weeks ago for her flank pain with no acute abnormalities found. Finding of lung nodule which she is following up with PCP for. She does have history of shingles for which she is taking gabapentin. No recent MRI, PT, or injections. She was scheduled for MBB last December but was unable to complete this due to being unable to lie on her stomach.     Current pain: 0/10 At worst: 8/10    Past Medical History:   Diagnosis Date     Anemia      Ankle arthritis      Arthritis      Back pain      Bell's palsy 9/08    left     Breast CA (H) 2004-    left lumpectomy, radiation, -recurrence     Colon polyps     colonoscopy-9/12-next due in 9/13     Congestive heart failure (H)      COPD (chronic obstructive pulmonary disease) (H)      Coronary artery disease      DM (diabetes mellitus) (H)      GERD (gastroesophageal reflux disease)      Hyperlipidemia      Hypertension      Lumbar spinal stenosis     use cane     Obesity      Osteoporosis      Other chronic pain      Pacemaker      Permanent atrial  fibrillation (H) 8/2008    S/P AV node ablation and pacemaker 10-25-12       Pleural effusion      Pulmonary hypertension      Rectal stenosis      Tobacco abuse     current     Tricuspid regurgitation        Past Medical History reviewed with patient during visit.    Past Surgical History:   Procedure Laterality Date     ARTHROSCOPY SHOULDER RT/LT  1999, 2004    Bilateral, right then left     BONE MARROW BIOPSY, BONE SPECIMEN, NEEDLE/TROCAR N/A 8/29/2017    Procedure: BIOPSY BONE MARROW;  BONE MARROW BIOPSY;  Surgeon: Marino Cohen MD;  Location:  GI     C DEXA, BONE DENSITY, AXIAL SKEL       C MAMMOGRAM, SCREENING  1/2009     COLONOSCOPY N/A 10/7/2016    Procedure: COMBINED COLONOSCOPY, SINGLE OR MULTIPLE BIOPSY/POLYPECTOMY BY BIOPSY;  Surgeon: Cassandra Mccord MD;  Location:  GI     ESOPHAGOSCOPY, GASTROSCOPY, DUODENOSCOPY (EGD), COMBINED N/A 8/10/2017    Procedure: COMBINED ESOPHAGOSCOPY, GASTROSCOPY, DUODENOSCOPY (EGD), BIOPSY SINGLE OR MULTIPLE;  gastroscopy;  Surgeon: Helene Bustamante MD;  Location:  GI     H ABLATION AV NODE  10/2012     HC COLONOSCOPY THRU STOMA, DIAGNOSTIC  ? 2005     IMPLANT PACEMAKER  10/2012    [id number], [id number]     JOINT REPLACEMTN, KNEE RT/LT      Joint Replacement knee bilateral     LAPAROSCOPIC CHOLECYSTECTOMY WITH CHOLANGIOGRAMS N/A 12/14/2015    Procedure: LAPAROSCOPIC CHOLECYSTECTOMY WITH CHOLANGIOGRAMS;  Surgeon: Ervin Amos MD;  Location:  OR     LUMPECTOMY BREAST      Left-2004     PHACOEMULSIFICATION CLEAR CORNEA WITH STANDARD INTRAOCULAR LENS IMPLANT Left 7/16/2015    Procedure: PHACOEMULSIFICATION CLEAR CORNEA WITH STANDARD INTRAOCULAR LENS IMPLANT;  Surgeon: Sai Obregon MD;  Location:  EC     PHACOEMULSIFICATION CLEAR CORNEA WITH TORIC INTRAOCULAR LENS IMPLANT Right 5/9/2017    Procedure: PHACOEMULSIFICATION CLEAR CORNEA WITH TORIC INTRAOCULAR LENS IMPLANT;  RIGHT EYE PHACOEMULSIFICATION CLEAR CORNEA WITH TORIC  INTRAOCULAR LENS IMPLANT ;  Surgeon: Duc Richmond MD;  Location: [facility]     Past Surgical History reviewed with patient during visit.    Current Outpatient Prescriptions   Medication     albuterol (PROAIR HFA, PROVENTIL HFA, VENTOLIN HFA) 108 (90 BASE) MCG/ACT inhaler     apixaban ANTICOAGULANT (ELIQUIS) 2.5 MG tablet     BETA BLOCKER NOT PRESCRIBED, INTENTIONAL,     betamethasone valerate (VALISONE) 0.1 % cream     ciprofloxacin (CIPRO) 250 MG tablet     fluticasone (FLONASE) 50 MCG/ACT spray     furosemide (LASIX) 20 MG tablet     GABAPENTIN PO     GlipiZIDE (GLUCOTROL PO)     hydrocortisone (ANUSOL-HC) 2.5 % cream     hydrOXYzine (VISTARIL) 25 MG capsule     lisinopril (PRINIVIL/ZESTRIL) 10 MG tablet     magnesium hydroxide (MILK OF MAGNESIA) 400 MG/5ML suspension     Multiple Vitamins-Minerals (PRESERVISION/LUTEIN) CAPS     nystatin (MYCOSTATIN) cream     ondansetron (ZOFRAN ODT) 4 MG ODT tab     oxyCODONE-acetaminophen (PERCOCET) 5-325 MG per tablet     ranitidine (ZANTAC) 150 MG tablet     simvastatin (ZOCOR) 10 MG tablet     tiotropium (SPIRIVA) 18 MCG capsule     No current facility-administered medications for this visit.        Allergies   Allergen Reactions     Ambien [Zolpidem Tartrate] Visual Disturbance     Hallucinations and fell out of bed at night.     Penicillins Hives     Definity      Caused a syncopal episode.     Sulfa Drugs Itching     Cymbalta Rash     Fluconazole Rash       Social History     Social History     Marital status:      Spouse name: N/A     Number of children: N/A     Years of education: N/A     Social History Main Topics     Smoking status: Current Some Day Smoker     Packs/day: 0.25     Years: 45.00     Types: Cigarettes     Smokeless tobacco: Never Used     Alcohol use No     Drug use: No     Sexual activity: Not Currently     Partners: Female     Other Topics Concern     Caffeine Concern Yes     2 cups/day     Sleep Concern Yes     Stress Concern Yes     Weight  Concern Yes     15+ lb weight loss since hospitalization      Special Diet Yes     bland diet r/t cholecystectomy, low salt      Exercise No     Seat Belt Yes     Social History Narrative    ,no childrenPOA- niece-Enedelia Claros-has plans to quitETOH-1/drink 2-3 /weekExercise-sit down DNR, DNI    Lived in [place] and [place] worked in [email or web address], [email or web address]       Family History   Problem Relation Age of Onset     Hypertension Mother      DIABETES Mother       at 83 yrs     C.A.D. Father       age 70s     Breast Cancer No family hx of      Colon Cancer No family hx of           ROS: 10 point ROS neg other than the symptoms noted above in the HPI.    Vital Signs: LMP  (LMP Unknown)    Examination:  Constitutional:  Alert, well nourished, NAD.  HEENT: Normocephalic, atraumatic.   Pulmonary:  Without shortness of breath, normal effort.   Lymph: no lymphadenopathy to low back or LE.   Integumentary: Skin is free of rashes or lesions.   Cardiovascular:  No pitting edema of BLE.      Neurological:  Awake  Alert  Oriented x 3  Speech clear  Cranial nerves II - XII grossly intact  PERRL  EOMI  Face symmetric  Tongue midline  Motor exam   Shoulder Abduction:  Right:  5/5   Left:  5/5  Biceps:                      Right:  5/5   Left:  5/5  Triceps:                     Right:  5/5   Left:  5/5  Wrist Extensors:       Right:  5/5   Left:  5/5  Wrist Flexors:           Right:  5/5   Left:  5/5  Intrinsics:                   Right:  5/5   Left:  5/5  Hip Flexor:                Right: 5/5  Left:  5/5  Hip Adductor:             Right:  5/5  Left:  5/5  Hip Abductor:             Right:  5/5  Left:  5/5  Gastroc Soleus:        Right:  5/5  Left:  5/5  Tib/Ant:                      Right:  5/5  Left:  5/5  EHL:                          Right:  5/5  Left:  5/5       Sensation normal to bilateral upper and lower extremities.    Reflexes are 2+ in the patellar and Achilles. There is no clonus. Downgoing  "Babinski.    Reflexes are 2+ in the brachial radialis and triceps. Negative Naomi sign bilaterally.  Musculoskeletal:  Gait: Able to stand from a seated position. Ambulates with walker.  Thoracic and lumbar examination reveals thoracolumbar scoliotic curvature. Tenderness to right paraspinous muscles and in dermatomal pattern to the right flank.    Imaging:   THORACIC SPINE TWO VIEW   4/11/2018 1:29 PM      HISTORY: Radicular thoracic spine pain.     COMPARISON: 9/10/2014      FINDINGS: There is a thoracolumbar scoliosis. Vertebroplasties at T12 and L1. Mild midthoracic vertebral body compression fracture is unchanged. No acute fractures evident. Multilevel degenerative disease and prominent thoracic kyphosis.       IMPRESSION: Multilevel degenerative disease. No acute abnormality.     BRISA MCKEON MD    LUMBAR SPINE TWO - THREE VIEWS   4/11/2018 1:29 PM      HISTORY: Pain of lumbar spine. Radicular pain.     COMPARISON: 5/3/2016      FINDINGS: Previous vertebroplasties at T12 and L1. Extensive  multilevel degenerative disc and facet disease without significant  change in appearance from the previous exam. Convex left thoracolumbar scoliosis. No acute fracture or malalignment.      IMPRESSION: No acute abnormality. Multilevel degenerative disease.     BRISA MCKEON MD    Assessment/Plan:   Helene Gibbs is a 83 year old female who presents for evaluation of chronic mid back pain x 3-4 months. Pain is located in thoracic region and radiates to the right flank. Describes the pain as an intermittent ache. XRs reviewed in office today. She does have reproducible pain over the right flank without any evidence of shingles on exam. I did recommend we obtain a thoracic CT to evaluate for any potential nerve compression in this region, (unable to get MRI due to pacemaker), but she does not wish to have one at this time as she has \"been having too many tests\". I did offer referral to [facility] in [place], " which she would like to move forward with at this time to discuss potential conservative pain management options. Advised if she does wish to move forward with the CT to contact our clinic and we will order this for her. Patient voiced understanding and agreement.          Minal Higgins PA-C  [facility]  [address] [address]    Tel [phone]  Pager [phone]      Again, thank you for allowing me to participate in the care of your patient.        Sincerely,        Minal Higgins PA-C

## 2018-04-24 ENCOUNTER — TELEPHONE (OUTPATIENT)
Dept: FAMILY MEDICINE | Facility: CLINIC | Age: 83
End: 2018-04-24

## 2018-04-24 NOTE — TELEPHONE ENCOUNTER
Spoke to the patient and answered her questions.  She does not want another CAT scan because even if it shows something she is not willing to get an injection because she cannot lay flat on her back.  He has made an appointment with pain clinic.  She is using pain medications which are helpful  We will give me a call if she needs more pain medication.  So we can renew her Percocet until her appointment with pain clinic.  Dr.Nasima Vilma MD

## 2018-04-24 NOTE — TELEPHONE ENCOUNTER
Reason for Call: Request for an order or referral:    Order or referral being requested:  She was told to have another CT Scan   Per the Spinal DrMaryamand she wants to talk to  about this    Date needed: at your convenience    Has the patient been seen by the PCP for this problem? YES    Additional comments: call to go over Scan    Phone number Patient can be reached at:  Home number on file [phone] (home)    Best Time:  anytime    Can we leave a detailed message on this number?  YES    Call taken on 4/24/2018 at 2:47 PM by Josue Gomes

## 2018-04-24 NOTE — TELEPHONE ENCOUNTER
Patient  has upcoming appointment with pain clinic([facility])  Please fax her CAT scan reports and x-ray results and the office visit notes during last 3-6 months.  Dr.Nasima Vilma MD

## 2018-04-25 ENCOUNTER — HOSPITAL ENCOUNTER (OUTPATIENT)
Dept: OCCUPATIONAL THERAPY | Facility: CLINIC | Age: 83
Setting detail: THERAPIES SERIES
End: 2018-04-25
Attending: INTERNAL MEDICINE
Payer: MEDICARE

## 2018-04-25 PROCEDURE — 97535 SELF CARE MNGMENT TRAINING: CPT | Mod: GO

## 2018-04-25 PROCEDURE — G8989 SELF CARE D/C STATUS: HCPCS | Mod: GO,CM

## 2018-04-25 PROCEDURE — G8988 SELF CARE GOAL STATUS: HCPCS | Mod: GO,CM

## 2018-04-25 PROCEDURE — 40000445 ZZHC STATISTIC OT VISIT, LYMPHEDEMA

## 2018-04-25 PROCEDURE — 97166 OT EVAL MOD COMPLEX 45 MIN: CPT | Mod: GO

## 2018-04-25 PROCEDURE — G8987 SELF CARE CURRENT STATUS: HCPCS | Mod: GO,CM

## 2018-04-25 NOTE — TELEPHONE ENCOUNTER
Tray Dangelo please read message below. Can you get this patient's records faxed to [facility]. Fax #[phone]. We referral reps are not to deal with faxing records. Thank you!    Selam Blank  Referral Coordinator

## 2018-04-26 DIAGNOSIS — M54.10 RADICULAR PAIN: ICD-10-CM

## 2018-04-26 DIAGNOSIS — M54.50 LUMBAR SPINE PAIN: ICD-10-CM

## 2018-04-26 DIAGNOSIS — G89.4 CHRONIC PAIN SYNDROME: ICD-10-CM

## 2018-04-26 DIAGNOSIS — M54.6 PAIN IN THORACIC SPINE: ICD-10-CM

## 2018-04-26 RX ORDER — OXYCODONE AND ACETAMINOPHEN 5; 325 MG/1; MG/1
1 TABLET ORAL 3 TIMES DAILY PRN
Qty: 40 TABLET | Refills: 0 | Status: SHIPPED | OUTPATIENT
Start: 2018-04-26 | End: 2018-05-14

## 2018-04-26 NOTE — TELEPHONE ENCOUNTER
Controlled Substance Refill Request for Percocet  Problem List Complete:  Yes    Last Written Prescription Date:  04/11/18  Last Fill Quantity: 40,   # refills: 0    Last Office Visit with [facility] primary care provider:     Clinic visit frequency required: Q 3 months     Future Office visit:   Next 5 appointments (look out 90 days)     May 04, 2018 11:30 AM CDT   Return Visit with Pieter Watson MD   [facility] ([facility])    [address]  [facility] [address]   [phone]            May 16, 2018  1:15 PM CDT   Return Visit with  Oncology Nurse   [facility] ([facility])    [facility]  [address]  [facility] [address]   [phone]            May 16, 2018  2:20 PM CDT   Return Visit with Estiven Cali MD   [facility] ([facility])    [facility]  [address]  [facility] [address]   [phone]                  Controlled substance agreement on file: Yes:  Date 11/01/17.     Processing:  Patient will  in clinic   checked in past 6 months?  No, route to WILBRET Jones MA

## 2018-04-26 NOTE — TELEPHONE ENCOUNTER
.Reason for Call:  Medication or medication refill:    Do you use a [facility]?  Name of the pharmacy and phone number for the current request:       [facility], [place] - [address], [address]      Name of the medication requested: Percocet     Other request: asked if she can pick this up tomorrow 4/27 by 3:00pm at the Front  Desk on the 1st Floor  Please call once it is ready    Can we leave a detailed message on this number? YES    Phone number patient can be reached at: Home number on file [phone] (home)    Best Time: ASAP    Call taken on 4/26/2018 at 10:21 AM by Josue Gomes

## 2018-04-26 NOTE — PROGRESS NOTES
"   04/25/18 7126   Quick Adds   Quick Adds Certification   Rehab Discipline   Discipline OT   Type of Visit   Type of visit Initial Edema Evaluation   General Information   Start of care 04/25/18   Referring physician Neisha Esparza MD   Orders Evaluate and treat as indicated   Order date 03/22/18   Medical diagnosis lymphedema of right lower extremity   Edema onset 03/22/18   Affected body parts LLE;RLE   Edema etiology comments Patient with extensive cardiac history, including CAD, CHF, pacer, pulmonary HTN, R and L TKA   Pertinent history of current problem (PT: include personal factors and/or comorbidities that impact the POC; OT: include additional occupational profile info) Patient reports feeling very overwhelmed with health concerns, upcoming move to assisted living.  Currently using 8-12 mmHg BBK compression socks, \"but I have one level stronger, I think I need to wear those\"  Additional PMH includes DM2, intractable back pain, COPD, anemia   Surgical / medical history reviewed Yes   Edema special tests Other  (patient scheduled to see vascular surgeon )   Prior level of functional mobility mod I   Prior treatment Compression garments;Diuretics   Community support Personal care attendant;Transportation service  (2 hours per week for bathing assist, light cleaning)   Patient role / employment history Retired   Living environment Apartment / condo  (moving to [facility] in July)   Living environment comments \"it's getting hard to manage everything where I am\"   Current assistive devices 4 wheeled walker   Fall Risk Screen   Fall screen completed by OT   Have you fallen 2 or more times in the past year? No   Have you fallen and had an injury in the past year? No   Is patient a fall risk? Yes;Department fall risk interventions implemented   System Outcome Measures   Outcome Measures Lymphedema   Lymphedema Life Impact Scale (score range 0-72). A higher score indicates greater impairment. 24   Subjective " "Report   Patient report of symptoms \"I feel like I'm walking on sponges.... it's hard to tell where the neuropathy ends and the swelling begins\"   Pain   Pain comments 0/10 \"I just took a pill\"; patient scheduled for evaluation at [facility]   Cognitive Status   Orientation Orientation to person, place and time   Level of consciousness Alert   Follows commands and answers questions 100% of the time   Personal safety and judgement Intact   Edema Exam / Assessment   Skin condition comments 2+ pitting b/l pre-tibial and right dorsum of foot, 1+ L dorsum.  Vascularities noted BBK, hi b /l feet and ankles, erythema  R >L, nail fungus   Stemmer sign Positive   Stemmer sign comments 2nd digit   Girth Measurements   Girth Measurements Other  (deferred for ed)   Activities of Daily Living   Activities of Daily Living patient has 2 hours/week PCA for shower assist and light housekeeping, transportation svcs, mod I all other I/ADL   Sensory   Sensory perception Light touch   Light touch Impaired   Sensory perception comments dorsum of L foot; neuropathy   Planned Edema Interventions   Planned edema intervention comments CDT recommended \"I just can't see it.  I don't have time for all of that, it would take up my whole day.  Maybe at my new place ([facility]) the nurses can help me, or I can get home care.  In the meantime, I'll use my stronger (BBK) compression socks\"   Clinical Impression   Criteria for skilled therapeutic intervention met Yes   Therapy diagnosis lymphedema likely secondary to cardiac and surgical hx   Influenced by the following impairments / conditions Edema;Stage 2  (does not reverse with elevation, fibrosis)   Assessment of Occupational Performance 3-5 Performance Deficits   Identified Performance Deficits impaired ADL, impaired IADL, impaired mobility, chronic pain   Clinical Decision Making (Complexity) Moderate complexity   Treatment frequency (CDT recommended, patient declined )   Patient / family and/or staff in " "agreement with plan of care Yes   Risks and benefits of therapy have been explained Yes   Clinical impression comments CDT recommended, patient declined due to upcoming move, other medical concerns \"I want to take care of this back pain first\"   Goal 1   Goal identifier precautions   Goal description Pt will independently verbalize the signs/symptoms of lymphedema, precautions and how to obtain a future lymphedema referral if edema persists to preserve skin integrity, prevent infection and preserve functional mobility.      Target date 04/25/18   Total Evaluation Time   Total evaluation time 15   Self Care   Self Care: Current Status , Goal ,  Discharge -Puhy Only- Modifier the same for all G-codes CM: 80-99% impairment   Self Care: Current & Discharge Modifier Rationale-Eval Only patient verbalizes understanding of precautions, \"I know I need to do something about this now\" declines recommended CDT   Certification   Certification date from 04/25/18   Certification date to 04/25/18   Medical Diagnosis lymphedema of right lower extremity     "

## 2018-04-26 NOTE — PROGRESS NOTES
"                                                                                                            [facility]        OUTPATIENT OCCUPATIONAL THERAPY EDEMA EVALUATION  PLAN OF TREATMENT FOR OUTPATIENT REHABILITATION  (COMPLETE FOR INITIAL CLAIMS ONLY)  Patient's Last Name, First Name, GAURIMaryamANTOINEMaryam  SaiHelene                           Provider s Name:   [facility] Medical Record No.  [id number]     Start of Care Date:  04/25/18   Onset Date:  03/22/18   Type:  OT   Medical Diagnosis:  lymphedema of right lower extremity   Therapy Diagnosis:  lymphedema likely secondary to cardiac and surgical hx Visits from SOC:  1                                     __________________________________________________________________________________   Plan of Treatment/Functional Goals:       CDT recommended \"I just can't see it.  I don't have time for all of that, it would take up my whole day.  Maybe at my new place ([facility]) the nurses can help me, or I can get home care.  In the meantime, I'll use my stronger (BBK) compression socks\"     GOALS  1. Goal description: Pt will independently verbalize the signs/symptoms of lymphedema, precautions and how to obtain a future lymphedema referral if edema persists to preserve skin integrity, prevent infection and preserve functional mobility.          Target date: 04/25/18 GOAL MET               Treatment frequency:  (CDT recommended, patient declined )        Marlen Elizabeth OT                                    I CERTIFY THE NEED FOR THESE SERVICES FURNISHED UNDER        THIS PLAN OF TREATMENT AND WHILE UNDER MY CARE     (Physician co-signature of this document indicates review and certification of the therapy plan).                   Certification date from: 04/25/18       Certification date to: 04/25/18           Referring physician: Neisha Esparza MD   Initial Assessment  See Epic Evaluation- Start of care: 04/25/18               "

## 2018-04-27 ENCOUNTER — INFUSION THERAPY VISIT (OUTPATIENT)
Dept: INFUSION THERAPY | Facility: CLINIC | Age: 83
End: 2018-04-27
Attending: INTERNAL MEDICINE
Payer: MEDICARE

## 2018-04-27 VITALS
RESPIRATION RATE: 18 BRPM | DIASTOLIC BLOOD PRESSURE: 59 MMHG | TEMPERATURE: 98.7 F | HEART RATE: 74 BPM | SYSTOLIC BLOOD PRESSURE: 110 MMHG

## 2018-04-27 DIAGNOSIS — T45.4X5D ADVERSE EFFECT OF IRON, SUBSEQUENT ENCOUNTER: ICD-10-CM

## 2018-04-27 DIAGNOSIS — D64.9 ANEMIA, UNSPECIFIED TYPE: ICD-10-CM

## 2018-04-27 DIAGNOSIS — E61.1 IRON DEFICIENCY: Primary | ICD-10-CM

## 2018-04-27 PROCEDURE — 25000128 H RX IP 250 OP 636: Performed by: INTERNAL MEDICINE

## 2018-04-27 PROCEDURE — 96365 THER/PROPH/DIAG IV INF INIT: CPT

## 2018-04-27 RX ADMIN — SODIUM CHLORIDE 250 ML: 9 INJECTION, SOLUTION INTRAVENOUS at 13:25

## 2018-04-27 RX ADMIN — FERRIC CARBOXYMALTOSE INJECTION 750 MG: 50 INJECTION, SOLUTION INTRAVENOUS at 13:25

## 2018-04-27 NOTE — PROGRESS NOTES
Infusion Nursing Note:  Helene Gibbs presents today for Injectafer.    Patient seen by provider today: No   present during visit today: Not Applicable.    Note: N/A.    Intravenous Access:  Peripheral IV placed.    Treatment Conditions:  Not Applicable.      Post Infusion Assessment:  Patient tolerated infusion without incident.  Patient observed for 30 minutes post Injectafer per protocol.  Blood return noted pre and post infusion.  Site patent and intact, free from redness, edema or discomfort.  No evidence of extravasations.  Access discontinued per protocol.    Discharge Plan:   Discharge instructions reviewed with: Patient.  Patient and/or family verbalized understanding of discharge instructions and all questions answered.  AVS to patient via [email or web address].  Patient will return PRN for next appointment.   Patient discharged in stable condition accompanied by: self.  Departure Mode: Ambulatory.    Rayshawn Coronel RN

## 2018-04-27 NOTE — MR AVS SNAPSHOT
After Visit Summary   4/27/2018    Helene Gibbs    MRN: [id number]           Patient Information     Date Of Birth          1935        Visit Information        Provider Department      4/27/2018 1:30 PM  INFUSION CHAIR 5 [facility]        Today's Diagnoses     Iron deficiency    -  1    Adverse effect of iron, subsequent encounter        Anemia, unspecified type           Follow-ups after your visit        Your next 10 appointments already scheduled     May 01, 2018  1:15 PM CDT   Teletrace with STONE TECH1   [facility] ([facility])    [address]  [place] [address]   [phone] OPT 2            May 04, 2018 11:00 AM CDT   US EMIGDIO DOPPLER NO EXERCISE 1-2 LVLS BILAT with VUS1   [facility] ([facility])    [address]  [place] [address]   [phone]           Please bring a list of your medicines (including vitamins, minerals and over-the-counter drugs). Also, tell your doctor about any allergies you may have. Wear comfortable clothes and leave your valuables at home.  No caffeine or tobacco for 1 hour prior to exam.  Please call the Imaging Department at your exam site with any questions.            May 04, 2018 11:30 AM CDT   Return Visit with Pieter Watson MD   [facility] ([facility])    [address]  [place] [address] [phone]            May 16, 2018  1:15 PM CDT   Return Visit with  Oncology Nurse   [facility] ([facility])    [facility]  [address]  [facility] [address] [phone]            May 16, 2018  2:20 PM CDT   Return Visit with Estiven Cali MD   [facility] ([facility])    [facility] [place]  [place]  [address]  [place] [address] [phone]              Who to contact     If you have questions or need follow up information about today's clinic visit or your schedule please contact [facility] directly at [phone].  Normal or non-critical lab and imaging results will be communicated to you by MyChart, letter or phone within 4 business days after the clinic has received the results. If you do not hear from us within 7 days, please contact the clinic through Vettro[employer] or phone. If you have a critical or abnormal lab result, we will notify you by phone as soon as possible.  Submit refill requests through [employer] or call your pharmacy and they will forward the refill request to us. Please allow 3 business days for your refill to be completed.          Additional Information About Your Visit        MyChart Information     [employer] gives you secure access to your electronic health record. If you see a primary care provider, you can also send messages to your care team and make appointments. If you have questions, please call your primary care clinic.  If you do not have a primary care provider, please call [phone] and they will assist you.        Care EveryWhere ID     This is your Care EveryWhere ID. This could be used by other organizations to access your [place] medical records  [id number]        Your Vitals Were     Pulse Temperature Respirations Last Period          74 98.7  F (37.1  C) (Oral) 18 (LMP Unknown)         Blood Pressure from Last 3 Encounters:   04/27/18 110/59   04/23/18 166/80   04/20/18 116/57    Weight from Last 3 Encounters:   04/17/18 80.8 kg (178 lb 3.2 oz)   04/16/18 79.4 kg (175 lb)   04/11/18 76.2 kg (168 lb)              Today, you had the following     No orders found for display       Primary Care Provider Office Phone # Fax #    Neisha Esparza [phone] [address]  [place]                 [place] [address]        Goals        General    start date: 4/17/14 I will weigh myself daily and if any weight gain or shortness of breath I will call the clinic. (pt-stated)     Notes - Note created  4/17/2014  3:59 PM by Margareth Pichardo, RN    As of today's date 4/17/2014 goal is met at 0 - 25%.   Goal Status:  Active        Equal Access to Services     [facility]: Hadii aad ku hadasho Soomaali, waaxda luqadaha, qaybta kaalmada adeegyada, waxay idiin hayaan adetori farris laMaryaan . So [facility] [phone].    ATENCIÓN: Si habla español, tiene a gupta disposición servicios gratuitos de asistencia lingüística. Llame al [phone].    We comply with applicable federal civil rights laws and [place] laws. We do not discriminate on the basis of race, color, national origin, age, disability, sex, sexual orientation, or gender identity.            Thank you!     Thank you for choosing [facility]  for your care. Our goal is always to provide you with excellent care. Hearing back from our patients is one way we can continue to improve our services. Please take a few minutes to complete the written survey that you may receive in the mail after your visit with us. Thank you!             Your Updated Medication List - Protect others around you: Learn how to safely use, store and throw away your medicines at www.disposemymeds.org.          This list is accurate as of 4/27/18  2:21 PM.  Always use your most recent med list.                   Brand Name Dispense Instructions for use Diagnosis    ACCU-CHEK SMARTVIEW test strip   Generic drug:  blood glucose monitoring     200 strip    USE 1 STRIP BY IN VITRO ROUTE 2 TIMES DAILY OR AS DIRECTED    Type 2 diabetes mellitus with diabetic nephropathy (H)       albuterol 108 (90 Base) MCG/ACT Inhaler    PROAIR HFA/PROVENTIL HFA/VENTOLIN HFA    1 Inhaler    Inhale 2 puffs into the lungs every 4 hours as needed for shortness of breath / dyspnea or  wheezing    COPD (chronic obstructive pulmonary disease) (H)       apixaban ANTICOAGULANT 2.5 MG tablet    ELIQUIS    180 tablet    Take 1 tablet (2.5 mg) by mouth 2 times daily    Atrial fibrillation, unspecified type (H)       BETA BLOCKER NOT PRESCRIBED (INTENTIONAL)      Beta Blocker not prescribed intentionally due to Other: due to ablation procedure        betamethasone valerate 0.1 % cream    VALISONE     Apply topically 2 times daily as needed (Use Sparingly)        fluticasone 50 MCG/ACT spray    FLONASE     Spray 1-2 sprays into both nostrils every evening        furosemide 20 MG tablet    LASIX    180 tablet    TAKE 1-2 TABS BY MOUTH ONCE DAILY. MAY REPEAT AFTER NOON IF NEEDED FOR WEIGHT GAIN/2+ EDEMA    Swelling       GABAPENTIN PO      Take 300 mg by mouth daily        GLUCOTROL PO      Take 5 mg by mouth every morning (before breakfast)        hydrocortisone 2.5 % cream    ANUSOL-HC    30 g    Place rectally 2 times daily    External hemorrhoids       hydrOXYzine 25 MG capsule    VISTARIL    180 capsule    Take 1 capsule (25 mg) by mouth 2 times daily as needed for itching    Itching       lisinopril 10 MG tablet    PRINIVIL/ZESTRIL    270 tablet    Take # 2 tablets every morning and one tablet every evening    Essential hypertension       magnesium hydroxide 400 MG/5ML suspension    MILK OF MAGNESIA     Take 30 mLs by mouth At Bedtime        nystatin cream    MYCOSTATIN     Apply topically 2 times daily as needed for dry skin        ondansetron 4 MG ODT tab    ZOFRAN ODT    30 tablet    Take 1 tablet (4 mg) by mouth every 6 hours as needed for nausea    Nausea       oxyCODONE-acetaminophen 5-325 MG per tablet    PERCOCET    40 tablet    Take 1 tablet by mouth 3 times daily as needed for severe pain maximum 6 tablet(s) per day    Radicular pain, Lumbar spine pain, Pain in thoracic spine       PRESERVISION/LUTEIN Caps      Take 1 capsule by mouth daily        ranitidine 150 MG tablet    ZANTAC    180  tablet    TAKE ONE TABLET BY MOUTH TWO TIMES A DAY    Dyspepsia       simvastatin 10 MG tablet    ZOCOR    90 tablet    TAKE 1 TABLET (10 MG) BY MOUTH AT BEDTIME    Type 2 diabetes mellitus with diabetic nephropathy, without long-term current use of insulin (H)       tiotropium 18 MCG capsule    SPIRIVA     Inhale 18 mcg into the lungs every evening

## 2018-05-01 ENCOUNTER — ALLIED HEALTH/NURSE VISIT (OUTPATIENT)
Dept: CARDIOLOGY | Facility: CLINIC | Age: 83
End: 2018-05-01
Payer: MEDICARE

## 2018-05-01 DIAGNOSIS — Z95.0 CARDIAC PACEMAKER IN SITU: Primary | ICD-10-CM

## 2018-05-01 PROCEDURE — 93293 PM PHONE R-STRIP DEVICE EVAL: CPT | Performed by: INTERNAL MEDICINE

## 2018-05-01 NOTE — MR AVS SNAPSHOT
After Visit Summary   5/1/2018    Helene Gibbs    MRN: [id number]           Patient Information     Date Of Birth          1935        Visit Information        Provider Department      5/1/2018 1:15 PM STONE TECH1 [facility]        Today's Diagnoses     Cardiac pacemaker in situ    -  1       Follow-ups after your visit        Your next 10 appointments already scheduled     May 04, 2018 11:00 AM CDT   US EMIGDIO DOPPLER NO EXERCISE 1-2 LVLS BILAT with SHVUS1   [facility] ([facility])    [address]  [place] [address]   [phone]           Please bring a list of your medicines (including vitamins, minerals and over-the-counter drugs). Also, tell your doctor about any allergies you may have. Wear comfortable clothes and leave your valuables at home.  No caffeine or tobacco for 1 hour prior to exam.  Please call the Imaging Department at your exam site with any questions.            May 04, 2018 11:30 AM CDT   Return Visit with Pieter Watson MD   [facility] ([facility])    [address]  [place] [address] [phone]            May 16, 2018  1:15 PM CDT   Return Visit with  Oncology Nurse   [facility] ([facility])    [facility]  [address]  [place] [address] [phone]            May 16, 2018  2:20 PM CDT   Return Visit with Estiven Cali MD   [facility] ([facility])    [facility]  [address]  [place] [address] [phone]            Jul 31, 2018  1:45 PM CDT   Pacemaker Check with CHASE YUNGN   [facility]   [place] ([facility])    [address]  [place] [address] [phone] OPT 2               Who to contact     If you have questions or need follow up information about today's clinic visit or your schedule please contact [facility]   [place] directly at [phone].  Normal or non-critical lab and imaging results will be communicated to you by MyChart, letter or phone within 4 business days after the clinic has received the results. If you do not hear from us within 7 days, please contact the clinic through MyChart or phone. If you have a critical or abnormal lab result, we will notify you by phone as soon as possible.  Submit refill requests through [employer] or call your pharmacy and they will forward the refill request to us. Please allow 3 business days for your refill to be completed.          Additional Information About Your Visit        [employer] Information     [employer] gives you secure access to your electronic health record. If you see a primary care provider, you can also send messages to your care team and make appointments. If you have questions, please call your primary care clinic.  If you do not have a primary care provider, please call [phone] and they will assist you.        Care EveryWhere ID     This is your Care EveryWhere ID. This could be used by other organizations to access your [place] medical records  [id number]        Your Vitals Were     Last Period                   (LMP Unknown)            Blood Pressure from Last 3 Encounters:   04/27/18 110/59   04/23/18 166/80   04/20/18 116/57    Weight from Last 3 Encounters:   04/17/18 80.8 kg (178 lb 3.2 oz)   04/16/18 79.4 kg (175 lb)   04/11/18 76.2 kg (168 lb)              We Performed the Following     PM PHONE R STRIP EVAL UP TO 90 DAYS ([id number])        Primary Care Provider Office Phone # Fax #    Neisha Esparza [phone] [address]  [place] [address]        Goals        General    start date: 4/17/14 I will weigh myself daily and if any  weight gain or shortness of breath I will call the clinic. (pt-stated)     Notes - Note created  4/17/2014  3:59 PM by Margareth Pichardo RN    As of today's date 4/17/2014 goal is met at 0 - 25%.   Goal Status:  Active        Equal Access to Services     BALWINDERSAMRA GARZAMARIMAR : Hadii aad ku hadbeatriceo Soomaali, waaxda luqadaha, qaybta kaalmada adeegyada, waxdouglas nicci kelsykristina ramos mikedc mccullough . So [facility] [phone].    ATENCIÓN: Si habla español, tiene a gupta disposición servicios gratuitos de asistencia lingüística. Llame al [phone].    We comply with applicable federal civil rights laws and [place] laws. We do not discriminate on the basis of race, color, national origin, age, disability, sex, sexual orientation, or gender identity.            Thank you!     Thank you for choosing [facility]  for your care. Our goal is always to provide you with excellent care. Hearing back from our patients is one way we can continue to improve our services. Please take a few minutes to complete the written survey that you may receive in the mail after your visit with us. Thank you!             Your Updated Medication List - Protect others around you: Learn how to safely use, store and throw away your medicines at www.disposemymeds.org.          This list is accurate as of 5/1/18  1:50 PM.  Always use your most recent med list.                   Brand Name Dispense Instructions for use Diagnosis    ACCU-CHEK SMARTVIEW test strip   Generic drug:  blood glucose monitoring     200 strip    USE 1 STRIP BY IN VITRO ROUTE 2 TIMES DAILY OR AS DIRECTED    Type 2 diabetes mellitus with diabetic nephropathy (H)       albuterol 108 (90 Base) MCG/ACT Inhaler    PROAIR HFA/PROVENTIL HFA/VENTOLIN HFA    1 Inhaler    Inhale 2 puffs into the lungs every 4 hours as needed for shortness of breath / dyspnea or wheezing    COPD (chronic obstructive pulmonary disease) (H)       apixaban ANTICOAGULANT 2.5 MG  tablet    ELIQUIS    180 tablet    Take 1 tablet (2.5 mg) by mouth 2 times daily    Atrial fibrillation, unspecified type (H)       BETA BLOCKER NOT PRESCRIBED (INTENTIONAL)      Beta Blocker not prescribed intentionally due to Other: due to ablation procedure        betamethasone valerate 0.1 % cream    VALISONE     Apply topically 2 times daily as needed (Use Sparingly)        fluticasone 50 MCG/ACT spray    FLONASE     Spray 1-2 sprays into both nostrils every evening        furosemide 20 MG tablet    LASIX    180 tablet    TAKE 1-2 TABS BY MOUTH ONCE DAILY. MAY REPEAT AFTER NOON IF NEEDED FOR WEIGHT GAIN/2+ EDEMA    Swelling       GABAPENTIN PO      Take 300 mg by mouth daily        GLUCOTROL PO      Take 5 mg by mouth every morning (before breakfast)        hydrocortisone 2.5 % cream    ANUSOL-HC    30 g    Place rectally 2 times daily    External hemorrhoids       hydrOXYzine 25 MG capsule    VISTARIL    180 capsule    Take 1 capsule (25 mg) by mouth 2 times daily as needed for itching    Itching       lisinopril 10 MG tablet    PRINIVIL/ZESTRIL    270 tablet    Take # 2 tablets every morning and one tablet every evening    Essential hypertension       magnesium hydroxide 400 MG/5ML suspension    MILK OF MAGNESIA     Take 30 mLs by mouth At Bedtime        nystatin cream    MYCOSTATIN     Apply topically 2 times daily as needed for dry skin        ondansetron 4 MG ODT tab    ZOFRAN ODT    30 tablet    Take 1 tablet (4 mg) by mouth every 6 hours as needed for nausea    Nausea       oxyCODONE-acetaminophen 5-325 MG per tablet    PERCOCET    40 tablet    Take 1 tablet by mouth 3 times daily as needed for severe pain maximum 6 tablet(s) per day    Radicular pain, Lumbar spine pain, Pain in thoracic spine       PRESERVISION/LUTEIN Caps      Take 1 capsule by mouth daily        ranitidine 150 MG tablet    ZANTAC    180 tablet    TAKE ONE TABLET BY MOUTH TWO TIMES A DAY    Dyspepsia       simvastatin 10 MG tablet     ZOCOR    90 tablet    TAKE 1 TABLET (10 MG) BY MOUTH AT BEDTIME    Type 2 diabetes mellitus with diabetic nephropathy, without long-term current use of insulin (H)       tiotropium 18 MCG capsule    SPIRIVA     Inhale 18 mcg into the lungs every evening

## 2018-05-01 NOTE — PROGRESS NOTES
~ 90 day office teletrace ~  Appropriate  at time of check.  Chronic Afib, taking Eliquis. Annual threshold scheduled in August. Order in for annual OV to be scheduled in March 2019. Bruna RUIZ

## 2018-05-04 ENCOUNTER — HOSPITAL ENCOUNTER (OUTPATIENT)
Dept: ULTRASOUND IMAGING | Facility: CLINIC | Age: 83
Discharge: HOME | DRG: 280 | End: 2018-05-04
Attending: SURGERY | Admitting: SURGERY
Payer: MEDICARE

## 2018-05-04 ENCOUNTER — OFFICE VISIT (OUTPATIENT)
Dept: OTHER | Facility: CLINIC | Age: 83
DRG: 280 | End: 2018-05-04
Attending: SURGERY
Payer: MEDICARE

## 2018-05-04 VITALS — HEART RATE: 70 BPM | DIASTOLIC BLOOD PRESSURE: 78 MMHG | SYSTOLIC BLOOD PRESSURE: 163 MMHG

## 2018-05-04 DIAGNOSIS — I73.9 PAD (PERIPHERAL ARTERY DISEASE) (H): ICD-10-CM

## 2018-05-04 DIAGNOSIS — I70.229 CRITICAL ISCHEMIA OF LOWER EXTREMITY (H): Primary | ICD-10-CM

## 2018-05-04 PROCEDURE — 99213 OFFICE O/P EST LOW 20 MIN: CPT | Mod: ZP | Performed by: SURGERY

## 2018-05-04 PROCEDURE — 93922 UPR/L XTREMITY ART 2 LEVELS: CPT

## 2018-05-04 PROCEDURE — G0463 HOSPITAL OUTPT CLINIC VISIT: HCPCS | Mod: 25

## 2018-05-04 NOTE — PROGRESS NOTES
Mrs. Gibbs is here for follow up today. Her left foot feels better but of late she has developed significant lymphedema of both lower extremities. She has seen a therapist but daily wrapping and her planning to move soon presents a serious logistical challenge. Her diuretics have also been increased.   I believe she will significantly benefit from a lymphedema pump as it will certainly help walking now that her arterial disease has been addressed.   Her left ABIs are significantly improved and I will see her back in 3 months with resting ABIs.

## 2018-05-04 NOTE — MR AVS SNAPSHOT
After Visit Summary   5/4/2018    Helene Gibbs    MRN: [id number]           Patient Information     Date Of Birth          1935        Visit Information        Provider Department      5/4/2018 11:30 AM Pieter Watson MD [facility]       Follow-ups after your visit        Your next 10 appointments already scheduled     May 16, 2018  1:15 PM CDT   Return Visit with  Oncology Nurse   [facility] ([facility])    [facility]  [address]  [facility] [address]   [phone]            May 16, 2018  2:20 PM CDT   Return Visit with Estiven Cali MD   [facility] ([facility])    [facility]  [address]  [facility] [address]   [phone]            Jul 31, 2018  1:45 PM CDT   Pacemaker Check with CHASE SAN   [facility] ([facility])    [address]  [facility] [address] [phone] OPT 2              Who to contact     If you have questions or need follow up information about today's clinic visit or your schedule please contact [facility] directly at [phone].  Normal or non-critical lab and imaging results will be communicated to you by MyChart, letter or phone within 4 business days after the clinic has received the results. If you do not hear from us within 7 days, please contact the clinic through MyChart or phone. If you have a critical or abnormal lab result, we will notify you by phone as soon as possible.  Submit refill requests through [employer] or call your pharmacy and they will forward the refill request to us. Please allow 3 business days for your refill to be completed.          Additional Information About Your Visit        [employer] Information     [employer] gives you secure access to your  electronic health record. If you see a primary care provider, you can also send messages to your care team and make appointments. If you have questions, please call your primary care clinic.  If you do not have a primary care provider, please call [phone] and they will assist you.        Care EveryWhere ID     This is your Care EveryWhere ID. This could be used by other organizations to access your [place] medical records  [id number]        Your Vitals Were     Pulse Last Period Breastfeeding?             70 (LMP Unknown) No          Blood Pressure from Last 3 Encounters:   05/04/18 163/78   04/27/18 110/59   04/23/18 166/80    Weight from Last 3 Encounters:   04/17/18 178 lb 3.2 oz (80.8 kg)   04/16/18 175 lb (79.4 kg)   04/11/18 168 lb (76.2 kg)              Today, you had the following     No orders found for display       Primary Care Provider Office Phone # Fax #    Neisha MINAYA MD [place] [phone] [address]  [place]                [place] [address]        Goals        General    start date: 4/17/14 I will weigh myself daily and if any weight gain or shortness of breath I will call the clinic. (pt-stated)     Notes - Note created  4/17/2014  3:59 PM by Margareth Pichardo, RN    As of today's date 4/17/2014 goal is met at 0 - 25%.   Goal Status:  Active        Equal Access to Services     [facility]: Hadii aad ku hadasho Caitlyn, waaxda luqadaha, qaybta kaalmada maddy, pamela nolasco. So [facility] [phone].    ATENCIÓN: Si habla español, tiene a gupta disposición servicios gratuitos de asistencia lingüística. Llame al [phone].    We comply with applicable federal civil rights laws and [place] laws. We do not discriminate on the basis of race, color, national origin, age, disability, sex, sexual orientation, or gender identity.            Thank you!     Thank you for choosing [facility] [place]  for your care. Our goal is  always to provide you with excellent care. Hearing back from our patients is one way we can continue to improve our services. Please take a few minutes to complete the written survey that you may receive in the mail after your visit with us. Thank you!             Your Updated Medication List - Protect others around you: Learn how to safely use, store and throw away your medicines at www.disposemymeds.org.          This list is accurate as of 5/4/18 11:58 AM.  Always use your most recent med list.                   Brand Name Dispense Instructions for use Diagnosis    ACCU-CHEK SMARTVIEW test strip   Generic drug:  blood glucose monitoring     200 strip    USE 1 STRIP BY IN VITRO ROUTE 2 TIMES DAILY OR AS DIRECTED    Type 2 diabetes mellitus with diabetic nephropathy (H)       albuterol 108 (90 Base) MCG/ACT Inhaler    PROAIR HFA/PROVENTIL HFA/VENTOLIN HFA    1 Inhaler    Inhale 2 puffs into the lungs every 4 hours as needed for shortness of breath / dyspnea or wheezing    COPD (chronic obstructive pulmonary disease) (H)       apixaban ANTICOAGULANT 2.5 MG tablet    ELIQUIS    180 tablet    Take 1 tablet (2.5 mg) by mouth 2 times daily    Atrial fibrillation, unspecified type (H)       BETA BLOCKER NOT PRESCRIBED (INTENTIONAL)      Beta Blocker not prescribed intentionally due to Other: due to ablation procedure        betamethasone valerate 0.1 % cream    VALISONE     Apply topically 2 times daily as needed (Use Sparingly)        fluticasone 50 MCG/ACT spray    FLONASE     Spray 1-2 sprays into both nostrils every evening        furosemide 20 MG tablet    LASIX    180 tablet    TAKE 1-2 TABS BY MOUTH ONCE DAILY. MAY REPEAT AFTER NOON IF NEEDED FOR WEIGHT GAIN/2+ EDEMA    Swelling       GABAPENTIN PO      Take 300 mg by mouth daily        GLUCOTROL PO      Take 5 mg by mouth every morning (before breakfast)        hydrocortisone 2.5 % cream    ANUSOL-HC    30 g    Place rectally 2 times daily    External hemorrhoids        hydrOXYzine 25 MG capsule    VISTARIL    180 capsule    Take 1 capsule (25 mg) by mouth 2 times daily as needed for itching    Itching       lisinopril 10 MG tablet    PRINIVIL/ZESTRIL    270 tablet    Take # 2 tablets every morning and one tablet every evening    Essential hypertension       magnesium hydroxide 400 MG/5ML suspension    MILK OF MAGNESIA     Take 30 mLs by mouth At Bedtime        nystatin cream    MYCOSTATIN     Apply topically 2 times daily as needed for dry skin        ondansetron 4 MG ODT tab    ZOFRAN ODT    30 tablet    Take 1 tablet (4 mg) by mouth every 6 hours as needed for nausea    Nausea       oxyCODONE-acetaminophen 5-325 MG per tablet    PERCOCET    40 tablet    Take 1 tablet by mouth 3 times daily as needed for severe pain maximum 6 tablet(s) per day    Radicular pain, Lumbar spine pain, Pain in thoracic spine       PRESERVISION/LUTEIN Caps      Take 1 capsule by mouth daily        ranitidine 150 MG tablet    ZANTAC    180 tablet    TAKE ONE TABLET BY MOUTH TWO TIMES A DAY    Dyspepsia       simvastatin 10 MG tablet    ZOCOR    90 tablet    TAKE 1 TABLET (10 MG) BY MOUTH AT BEDTIME    Type 2 diabetes mellitus with diabetic nephropathy, without long-term current use of insulin (H)       tiotropium 18 MCG capsule    SPIRIVA     Inhale 18 mcg into the lungs every evening

## 2018-05-05 ENCOUNTER — APPOINTMENT (OUTPATIENT)
Dept: GENERAL RADIOLOGY | Facility: CLINIC | Age: 83
DRG: 280 | End: 2018-05-05
Attending: EMERGENCY MEDICINE
Payer: MEDICARE

## 2018-05-05 ENCOUNTER — HOSPITAL ENCOUNTER (INPATIENT)
Facility: CLINIC | Age: 83
LOS: 2 days | Discharge: HOME HEALTH | DRG: 280 | End: 2018-05-08
Attending: EMERGENCY MEDICINE | Admitting: INTERNAL MEDICINE
Payer: MEDICARE

## 2018-05-05 DIAGNOSIS — I10 BENIGN ESSENTIAL HYPERTENSION: ICD-10-CM

## 2018-05-05 DIAGNOSIS — R07.89 CHEST PRESSURE: ICD-10-CM

## 2018-05-05 DIAGNOSIS — R10.10 UPPER ABDOMINAL PAIN: ICD-10-CM

## 2018-05-05 DIAGNOSIS — I21.4 NSTEMI (NON-ST ELEVATED MYOCARDIAL INFARCTION) (H): Primary | ICD-10-CM

## 2018-05-05 DIAGNOSIS — I50.33 ACUTE ON CHRONIC DIASTOLIC CONGESTIVE HEART FAILURE (H): ICD-10-CM

## 2018-05-05 DIAGNOSIS — R60.9 SWELLING: ICD-10-CM

## 2018-05-05 DIAGNOSIS — R53.81 PHYSICAL DECONDITIONING: ICD-10-CM

## 2018-05-05 DIAGNOSIS — I50.31 ACUTE DIASTOLIC CONGESTIVE HEART FAILURE (H): ICD-10-CM

## 2018-05-05 DIAGNOSIS — I48.21 PERMANENT ATRIAL FIBRILLATION (H): ICD-10-CM

## 2018-05-05 DIAGNOSIS — N18.30 CKD (CHRONIC KIDNEY DISEASE) STAGE 3, GFR 30-59 ML/MIN (H): ICD-10-CM

## 2018-05-05 LAB
ALBUMIN SERPL-MCNC: 3.9 G/DL (ref 3.4–5)
ALBUMIN UR-MCNC: 30 MG/DL
ALP SERPL-CCNC: 70 U/L (ref 40–150)
ALT SERPL W P-5'-P-CCNC: 15 U/L (ref 0–50)
ANION GAP SERPL CALCULATED.3IONS-SCNC: 9 MMOL/L (ref 3–14)
APPEARANCE UR: CLEAR
AST SERPL W P-5'-P-CCNC: 17 U/L (ref 0–45)
BASOPHILS # BLD AUTO: 0 10E9/L (ref 0–0.2)
BASOPHILS NFR BLD AUTO: 0.3 %
BILIRUB SERPL-MCNC: 0.7 MG/DL (ref 0.2–1.3)
BILIRUB UR QL STRIP: NEGATIVE
BUN SERPL-MCNC: 13 MG/DL (ref 7–30)
CALCIUM SERPL-MCNC: 9.4 MG/DL (ref 8.5–10.1)
CHLORIDE SERPL-SCNC: 106 MMOL/L (ref 94–109)
CO2 SERPL-SCNC: 23 MMOL/L (ref 20–32)
COLOR UR AUTO: ABNORMAL
CREAT SERPL-MCNC: 1.27 MG/DL (ref 0.52–1.04)
DIFFERENTIAL METHOD BLD: ABNORMAL
EOSINOPHIL # BLD AUTO: 0.1 10E9/L (ref 0–0.7)
EOSINOPHIL NFR BLD AUTO: 1.9 %
ERYTHROCYTE [DISTWIDTH] IN BLOOD BY AUTOMATED COUNT: 22.1 % (ref 10–15)
GFR SERPL CREATININE-BSD FRML MDRD: 40 ML/MIN/1.7M2
GLUCOSE SERPL-MCNC: 118 MG/DL (ref 70–99)
GLUCOSE UR STRIP-MCNC: NEGATIVE MG/DL
HCT VFR BLD AUTO: 37.8 % (ref 35–47)
HGB BLD-MCNC: 11.6 G/DL (ref 11.7–15.7)
HGB UR QL STRIP: NEGATIVE
HYALINE CASTS #/AREA URNS LPF: 4 /LPF (ref 0–2)
IMM GRANULOCYTES # BLD: 0 10E9/L (ref 0–0.4)
IMM GRANULOCYTES NFR BLD: 0 %
INTERPRETATION ECG - MUSE: NORMAL
KETONES UR STRIP-MCNC: 5 MG/DL
LEUKOCYTE ESTERASE UR QL STRIP: NEGATIVE
LIPASE SERPL-CCNC: 67 U/L (ref 73–393)
LYMPHOCYTES # BLD AUTO: 0.8 10E9/L (ref 0.8–5.3)
LYMPHOCYTES NFR BLD AUTO: 14.5 %
MCH RBC QN AUTO: 27.9 PG (ref 26.5–33)
MCHC RBC AUTO-ENTMCNC: 30.7 G/DL (ref 31.5–36.5)
MCV RBC AUTO: 91 FL (ref 78–100)
MONOCYTES # BLD AUTO: 0.6 10E9/L (ref 0–1.3)
MONOCYTES NFR BLD AUTO: 10.9 %
MUCOUS THREADS #/AREA URNS LPF: PRESENT /LPF
NEUTROPHILS # BLD AUTO: 4.2 10E9/L (ref 1.6–8.3)
NEUTROPHILS NFR BLD AUTO: 72.4 %
NITRATE UR QL: NEGATIVE
NRBC # BLD AUTO: 0 10*3/UL
NRBC BLD AUTO-RTO: 0 /100
NT-PROBNP SERPL-MCNC: 6738 PG/ML (ref 0–1800)
PH UR STRIP: 7.5 PH (ref 5–7)
PLATELET # BLD AUTO: 214 10E9/L (ref 150–450)
POTASSIUM SERPL-SCNC: 3.7 MMOL/L (ref 3.4–5.3)
PROT SERPL-MCNC: 8.2 G/DL (ref 6.8–8.8)
RBC # BLD AUTO: 4.16 10E12/L (ref 3.8–5.2)
RBC #/AREA URNS AUTO: 2 /HPF (ref 0–2)
SODIUM SERPL-SCNC: 138 MMOL/L (ref 133–144)
SOURCE: ABNORMAL
SP GR UR STRIP: 1 (ref 1–1.03)
SQUAMOUS #/AREA URNS AUTO: 1 /HPF (ref 0–1)
TROPONIN I SERPL-MCNC: 0.04 UG/L (ref 0–0.04)
UROBILINOGEN UR STRIP-MCNC: NORMAL MG/DL (ref 0–2)
WBC # BLD AUTO: 5.8 10E9/L (ref 4–11)
WBC #/AREA URNS AUTO: 5 /HPF (ref 0–5)

## 2018-05-05 PROCEDURE — 25000132 ZZH RX MED GY IP 250 OP 250 PS 637: Mod: GY | Performed by: EMERGENCY MEDICINE

## 2018-05-05 PROCEDURE — A9270 NON-COVERED ITEM OR SERVICE: HCPCS | Mod: GY | Performed by: EMERGENCY MEDICINE

## 2018-05-05 PROCEDURE — 85025 COMPLETE CBC W/AUTO DIFF WBC: CPT | Performed by: EMERGENCY MEDICINE

## 2018-05-05 PROCEDURE — 99285 EMERGENCY DEPT VISIT HI MDM: CPT | Mod: 25

## 2018-05-05 PROCEDURE — 81001 URINALYSIS AUTO W/SCOPE: CPT | Performed by: EMERGENCY MEDICINE

## 2018-05-05 PROCEDURE — 80053 COMPREHEN METABOLIC PANEL: CPT | Performed by: EMERGENCY MEDICINE

## 2018-05-05 PROCEDURE — 83880 ASSAY OF NATRIURETIC PEPTIDE: CPT | Performed by: EMERGENCY MEDICINE

## 2018-05-05 PROCEDURE — 83690 ASSAY OF LIPASE: CPT | Performed by: EMERGENCY MEDICINE

## 2018-05-05 PROCEDURE — 84484 ASSAY OF TROPONIN QUANT: CPT | Performed by: EMERGENCY MEDICINE

## 2018-05-05 PROCEDURE — 93005 ELECTROCARDIOGRAM TRACING: CPT

## 2018-05-05 PROCEDURE — 99215 OFFICE O/P EST HI 40 MIN: CPT | Performed by: INTERNAL MEDICINE

## 2018-05-05 PROCEDURE — 25000128 H RX IP 250 OP 636: Performed by: EMERGENCY MEDICINE

## 2018-05-05 PROCEDURE — 96374 THER/PROPH/DIAG INJ IV PUSH: CPT

## 2018-05-05 PROCEDURE — 71046 X-RAY EXAM CHEST 2 VIEWS: CPT

## 2018-05-05 RX ORDER — ASPIRIN 81 MG/1
162 TABLET, CHEWABLE ORAL ONCE
Status: COMPLETED | OUTPATIENT
Start: 2018-05-05 | End: 2018-05-05

## 2018-05-05 RX ORDER — FUROSEMIDE 10 MG/ML
40 INJECTION INTRAMUSCULAR; INTRAVENOUS ONCE
Status: COMPLETED | OUTPATIENT
Start: 2018-05-05 | End: 2018-05-05

## 2018-05-05 RX ADMIN — FUROSEMIDE 40 MG: 10 INJECTION, SOLUTION INTRAVENOUS at 23:17

## 2018-05-05 RX ADMIN — ASPIRIN 81 MG 162 MG: 81 TABLET ORAL at 23:11

## 2018-05-05 ASSESSMENT — ENCOUNTER SYMPTOMS
SHORTNESS OF BREATH: 1
VOMITING: 0
NAUSEA: 0
FEVER: 0
DIARRHEA: 1
ABDOMINAL PAIN: 1

## 2018-05-05 NOTE — IP AVS SNAPSHOT
MRN:[id number]                      After Visit Summary   5/5/2018    Helene Gibbs    MRN: [id number]           Thank you!     Thank you for choosing [facility] for your care. Our goal is always to provide you with excellent care. Hearing back from our patients is one way we can continue to improve our services. Please take a few minutes to complete the written survey that you may receive in the mail after you visit with us. Thank you!        Patient Information     Date Of Birth          1935        About your hospital stay     You were admitted on:  May 6, 2018 You last received care in the:  [facility]    You were discharged on:  May 8, 2018        Reason for your hospital stay       You were hospitalized for further evaluation and treatment of chest pain with findings of NSTEMI (non-ST segment myocardial infarction) and diastolic heart failure exacerbation.                  Who to Call     For medical emergencies, please call 911.  For non-urgent questions about your medical care, please call your primary care provider or clinic, [phone]          Attending Provider     Provider Specialty    Antonio Givens DO Emergency Medicine    Reyes, Imer Huynh MD Internal Medicine       Primary Care Provider Office Phone # Fax #    Neisha MARIMAR Esparza [phone]      After Care Instructions     Activity       Your activity upon discharge: activity as tolerated            Diet       Follow this diet upon discharge: Combination Diet 5600-5009 Calories: Moderate Consistent CHO (4-6 CHO units/meal); 2 gm NA Diet            Discharge Instructions       1) Take Lisinopril 10 mg by mouth twice daily, this is a change form your prior regimen   2) Metoprolol tartrate 25 mg by mouth twice daily added, prescription sent at discharge   3) Lasix 40 mg by mouth daily prescribed at discharge  4) Follow-up with Cardiology as scheduled   5) Home RN,  HHA, PT, OT ordered at discharge                  Follow-up Appointments     Follow-up and recommended labs and tests        Follow up with primary care provider, Neisha Esparza, within 7 days for hospital follow- up.  No follow up labs or test are needed.    Follow up with Cardiology as scheduled, follow-up labs ordered.                  Your next 10 appointments already scheduled     May 14, 2018 12:30 PM CDT   Office Visit with Neisha Esparza MD   [facility]    [address] [address] [phone]           Bring a current list of meds and any records pertaining to this visit. For Physicals, please bring immunization records and any forms needing to be filled out. Please arrive 10 minutes early to complete paperwork.            May 16, 2018  1:15 PM CDT   Return Visit with  Oncology Nurse   [facility] ([facility])    [facility]  [address]  [facility] [address] [phone]            May 16, 2018  2:20 PM CDT   Return Visit with Estiven Cali MD   [facility] ([facility])    [facility]  [address]  [facility] [address] [phone]            May 23, 2018  1:30 PM CDT   LAB with [facility]   [facility] ([facility])    [address]  [facility] [address] [phone]           Please do not eat 10-12 hours before your appointment if you are coming in fasting for labs on lipids, cholesterol, or glucose (sugar). This does not apply to pregnant women. Water, hot tea and black coffee (with nothing added) are okay. Do not drink other fluids, diet soda or chew gum.            May 23, 2018  2:30 PM CDT   Return Discharge with ALLEN Arrington CNP   [facility]   [place] ([facility])    [address]  [place] [address]   [phone] OPT 2            Jul 31, 2018  1:45 PM CDT   Pacemaker Check with CHASE SAN   [facility] ([facility])    [address]  [place] [address]   [phone] OPT 2            Aug 06, 2018  3:15 PM CDT   [facility] EP RETURN with Jc Juarez MD   [facility] ([facility])    [address]  [place] [address]   [phone] OPT 2              Additional Services     Home Care OT Referral for Hospital Discharge       OT to eval and treat    Your provider has ordered home care - occupational therapy. If you have not been contacted within 2 days of your discharge please call the department phone number listed on the top of this document.            Home Care PT Referral for Hospital Discharge       PT to eval and treat    Your provider has ordered home care - physical therapy. If you have not been contacted within 2 days of your discharge please call the department phone number listed on the top of this document.            Home care nursing referral       RN skilled nursing visit. RN to assess vital signs and weight, respiratory and cardiac status, patients ability to take and record daily blood pressure, temp and weight and hydration, nutrition and bowel status.  RN to teach medication management.    Home health aide to assist with ADLs    Your provider has ordered home care nursing services. If you have not been contacted within 2 days of your discharge please call the inpatient department phone number at [phone] .            Follow-Up with Cardiac Advanced Practice Provider           Follow-Up with Cardiologist                 Future tests that were ordered for you     Basic metabolic panel                 Pending Results     No orders found from 5/3/2018 to 5/6/2018.            Statement of Approval     Ordered          05/08/18 0950  I  "have reviewed and agree with all the recommendations and orders detailed in this document.  EFFECTIVE NOW     Approved and electronically signed by:  Juan Cortez MD             Admission Information     Date & Time Provider Department Dept. Phone    5/5/2018 Imer Reyes MD [facility] [phone]      Your Vitals Were     Blood Pressure Pulse Temperature Respirations Height Weight    115/76 (BP Location: Right arm) 70 96.9  F (36.1  C) (Axillary) 16 1.676 m (5' 6\") 72.9 kg (160 lb 12.8 oz)    Last Period Pulse Oximetry BMI (Body Mass Index)             (LMP Unknown) 98% 25.95 kg/m2         MyChart Information     [employer] gives you secure access to your electronic health record. If you see a primary care provider, you can also send messages to your care team and make appointments. If you have questions, please call your primary care clinic.  If you do not have a primary care provider, please call [phone] and they will assist you.        Care EveryWhere ID     This is your Care EveryWhere ID. This could be used by other organizations to access your [facility] medical records  [id number]        Equal Access to Services     TONG TUCKER AH: Hadii sukhdeep Brady, washirleneda ludelvis, qaybta kaalmada maddy, pamela nolasco. So [facility] [phone].    ATENCIÓN: Si habla español, tiene a gupta disposición servicios gratuitos de asistencia lingüística. Maxim al [phone].    We comply with applicable federal civil rights laws and [place] laws. We do not discriminate on the basis of race, color, national origin, age, disability, sex, sexual orientation, or gender identity.               Review of your medicines      START taking        Dose / Directions    aspirin 81 MG EC tablet        Dose:  81 mg   Start taking on:  5/9/2018   Take 1 tablet (81 mg) by mouth daily   Refills:  0       metoprolol tartrate 25 MG tablet   Commonly known as:  " LOPRESSOR        Dose:  25 mg   Take 1 tablet (25 mg) by mouth 2 times daily   Quantity:  60 tablet   Refills:  0         CONTINUE these medicines which may have CHANGED, or have new prescriptions. If we are uncertain of the size of tablets/capsules you have at home, strength may be listed as something that might have changed.        Dose / Directions    furosemide 40 MG tablet   Commonly known as:  LASIX   This may have changed:  See the new instructions.   Used for:  Acute diastolic congestive heart failure (H)        Dose:  40 mg   Start taking on:  5/9/2018   Take 1 tablet (40 mg) by mouth daily   Quantity:  30 tablet   Refills:  0       lisinopril 10 MG tablet   Commonly known as:  PRINIVIL/ZESTRIL   This may have changed:    - medication strength  - when to take this  - Another medication with the same name was removed. Continue taking this medication, and follow the directions you see here.        Dose:  10 mg   Take 1 tablet (10 mg) by mouth 2 times daily   Quantity:  60 tablet   Refills:  0         CONTINUE these medicines which have NOT CHANGED        Dose / Directions    ACCU-CHEK SMARTVIEW test strip   Used for:  Type 2 diabetes mellitus with diabetic nephropathy (H)   Generic drug:  blood glucose monitoring        USE 1 STRIP BY IN VITRO ROUTE 2 TIMES DAILY OR AS DIRECTED   Quantity:  200 strip   Refills:  1       albuterol 108 (90 Base) MCG/ACT Inhaler   Commonly known as:  PROAIR HFA/PROVENTIL HFA/VENTOLIN HFA   Used for:  COPD (chronic obstructive pulmonary disease) (H)        Dose:  2 puff   Inhale 2 puffs into the lungs every 4 hours as needed for shortness of breath / dyspnea or wheezing   Quantity:  1 Inhaler   Refills:  5       apixaban ANTICOAGULANT 2.5 MG tablet   Commonly known as:  ELIQUIS   Used for:  Atrial fibrillation, unspecified type (H)        Dose:  2.5 mg   Take 1 tablet (2.5 mg) by mouth 2 times daily   Quantity:  180 tablet   Refills:  3       fluticasone 50 MCG/ACT spray    Commonly known as:  FLONASE        Dose:  1-2 spray   Spray 1-2 sprays into both nostrils every evening   Refills:  0       GLUCOTROL PO        Dose:  5 mg   Take 5 mg by mouth every morning (before breakfast)   Refills:  0       hydrocortisone 2.5 % cream   Commonly known as:  ANUSOL-HC   Used for:  External hemorrhoids        Place rectally 2 times daily   Quantity:  30 g   Refills:  0       hydrOXYzine 25 MG capsule   Commonly known as:  VISTARIL   Used for:  Itching        Dose:  25 mg   Take 1 capsule (25 mg) by mouth 2 times daily as needed for itching   Quantity:  180 capsule   Refills:  1       magnesium hydroxide 400 MG/5ML suspension   Commonly known as:  MILK OF MAGNESIA        Dose:  30 mL   Take 30 mLs by mouth At Bedtime   Refills:  0       ondansetron 4 MG ODT tab   Commonly known as:  ZOFRAN ODT   Used for:  Nausea        Dose:  4 mg   Take 1 tablet (4 mg) by mouth every 6 hours as needed for nausea   Quantity:  30 tablet   Refills:  1       oxyCODONE-acetaminophen 5-325 MG per tablet   Commonly known as:  PERCOCET   Used for:  Radicular pain, Lumbar spine pain, Pain in thoracic spine        Dose:  1 tablet   Take 1 tablet by mouth 3 times daily as needed for severe pain maximum 6 tablet(s) per day   Quantity:  40 tablet   Refills:  0       PRESERVISION/LUTEIN Caps        Dose:  2 capsule   Take 2 capsules by mouth daily   Refills:  0       ranitidine 150 MG tablet   Commonly known as:  ZANTAC   Used for:  Dyspepsia        TAKE ONE TABLET BY MOUTH TWO TIMES A DAY   Quantity:  180 tablet   Refills:  3       simvastatin 10 MG tablet   Commonly known as:  ZOCOR   Used for:  Type 2 diabetes mellitus with diabetic nephropathy, without long-term current use of insulin (H)        TAKE 1 TABLET (10 MG) BY MOUTH AT BEDTIME   Quantity:  90 tablet   Refills:  2       tiotropium 18 MCG capsule   Commonly known as:  SPIRIVA        Dose:  18 mcg   Inhale 18 mcg into the lungs every evening   Refills:  0          STOP taking     BETA BLOCKER NOT PRESCRIBED (INTENTIONAL)                Where to get your medicines      These medications were sent to [facility] [place], [place] - [address]  [address] [place] [address]     Phone:  [phone]     furosemide 40 MG tablet    metoprolol tartrate 25 MG tablet         Some of these will need a paper prescription and others can be bought over the counter. Ask your nurse if you have questions.     You don't need a prescription for these medications     aspirin 81 MG EC tablet    lisinopril 10 MG tablet                Protect others around you: Learn how to safely use, store and throw away your medicines at www.disposemymeds.org.             Medication List: This is a list of all your medications and when to take them. Check marks below indicate your daily home schedule. Keep this list as a reference.      Medications           Morning Afternoon Evening Bedtime As Needed    ACCU-CHEK SMARTVIEW test strip   USE 1 STRIP BY IN VITRO ROUTE 2 TIMES DAILY OR AS DIRECTED   Generic drug:  blood glucose monitoring                                   albuterol 108 (90 Base) MCG/ACT Inhaler   Commonly known as:  PROAIR HFA/PROVENTIL HFA/VENTOLIN HFA   Inhale 2 puffs into the lungs every 4 hours as needed for shortness of breath / dyspnea or wheezing                                   apixaban ANTICOAGULANT 2.5 MG tablet   Commonly known as:  ELIQUIS   Take 1 tablet (2.5 mg) by mouth 2 times daily   Last time this was given:  2.5 mg on 5/8/2018 10:18 AM   Next Dose Due:  5-8-2018                                      aspirin 81 MG EC tablet   Take 1 tablet (81 mg) by mouth daily   Start taking on:  5/9/2018   Last time this was given:  81 mg on 5/8/2018  8:19 AM   Next Dose Due:  5-9-2018                                   fluticasone 50 MCG/ACT spray   Commonly known as:  FLONASE   Spray 1-2 sprays into both nostrils every evening   Last time this was given:  1  spray on 5/7/2018  9:15 PM   Next Dose Due:  5-8-2018                                     furosemide 40 MG tablet   Commonly known as:  LASIX   Take 1 tablet (40 mg) by mouth daily   Start taking on:  5/9/2018   Last time this was given:  40 mg on 5/8/2018  8:18 AM   Next Dose Due:  5-9-2018                                   GLUCOTROL PO   Take 5 mg by mouth every morning (before breakfast)   Next Dose Due:  5-9-2018                                   hydrocortisone 2.5 % cream   Commonly known as:  ANUSOL-HC   Place rectally 2 times daily   Next Dose Due:  5-8-2018                                      hydrOXYzine 25 MG capsule   Commonly known as:  VISTARIL   Take 1 capsule (25 mg) by mouth 2 times daily as needed for itching                                   lisinopril 10 MG tablet   Commonly known as:  PRINIVIL/ZESTRIL   Take 1 tablet (10 mg) by mouth 2 times daily   Last time this was given:  10 mg on 5/8/2018  8:19 AM   Next Dose Due:  5-8-2018                                      magnesium hydroxide 400 MG/5ML suspension   Commonly known as:  MILK OF MAGNESIA   Take 30 mLs by mouth At Bedtime   Last time this was given:  30 mLs on 5/7/2018 12:17 AM   Next Dose Due:  5-8-2018                                   metoprolol tartrate 25 MG tablet   Commonly known as:  LOPRESSOR   Take 1 tablet (25 mg) by mouth 2 times daily   Last time this was given:  25 mg on 5/8/2018  8:18 AM   Next Dose Due:  5-8-2018                                      ondansetron 4 MG ODT tab   Commonly known as:  ZOFRAN ODT   Take 1 tablet (4 mg) by mouth every 6 hours as needed for nausea                                   oxyCODONE-acetaminophen 5-325 MG per tablet   Commonly known as:  PERCOCET   Take 1 tablet by mouth 3 times daily as needed for severe pain maximum 6 tablet(s) per day                                   PRESERVISION/LUTEIN Caps   Take 2 capsules by mouth daily   Next Dose Due:  5-9-2018                                    ranitidine 150 MG tablet   Commonly known as:  ZANTAC   TAKE ONE TABLET BY MOUTH TWO TIMES A DAY   Last time this was given:  150 mg on 5/7/2018  9:15 PM   Next Dose Due:  5-8-2018                                      simvastatin 10 MG tablet   Commonly known as:  ZOCOR   TAKE 1 TABLET (10 MG) BY MOUTH AT BEDTIME   Last time this was given:  10 mg on 5/7/2018  9:16 PM   Next Dose Due:  5-8-2018                                   tiotropium 18 MCG capsule   Commonly known as:  SPIRIVA   Inhale 18 mcg into the lungs every evening   Next Dose Due:  5-8-2018

## 2018-05-05 NOTE — LETTER
Transition Communication Hand-off for Care Transitions to Next Level of Care Provider    Name: Helene Gibbs  : 1935  MRN #: [id number]  Primary Care Provider: Neisha Esparza  Primary Care MD Name: Dr. Esparza  Primary Clinic: [address]  [place]                [place] [address]  Primary Care Clinic Name: [facility]  Reason for Hospitalization:  Chest pressure [R07.89]  Upper abdominal pain [R10.10]  Admit Date/Time: 2018  8:41 PM  Discharge Date: 2018  Payor Source: Payor: MEDICARE / Plan: MEDICARE / Product Type: Medicare /     Readmission Assessment Measure (DONIS) Risk Score/category:  Average    Plan of Care Goals/Milestone Events:          Reason for Communication Hand-off Referral: Multiple providers/specialties    Discharge Plan:       Concern for non-adherence with plan of care:   Y/N no  Discharge Needs Assessment:  Needs       Most Recent Value    # of Referrals Placed by [facility], Scheduled Follow-up appointments, Communication hand-offs to next level of Care Providers          Already enrolled in Tele-monitoring program and name of program:  NA  Follow-up specialty is recommended: Yes    Follow-up plan:  Future Appointments  Date Time Provider Department Center   2018 12:30 PM Neisha Esparza MD [facility]    2018 1:15 PM Nurse,  Oncology [facility]   2018 2:20 PM Estiven Cali MD [facility]   2018 1:30 PM [facility]   2018 2:30 PM [facility]   2018 1:45 PM [facility]   2018 3:15 PM [facility]       Any outstanding tests or procedures:        Referrals     Future Labs/Procedures    Follow-Up with Cardiac Advanced Practice Provider     Follow-Up with Cardiologist     Home care nursing referral     Comments:    RN skilled nursing visit. RN to assess vital signs and weight, respiratory and cardiac status, patients ability to  take and record daily blood pressure, temp and weight and hydration, nutrition and bowel status.  RN to teach medication management.    Home health aide to assist with ADLs    Your provider has ordered home care nursing services. If you have not been contacted within 2 days of your discharge please call the inpatient department phone number at [phone] .    Home Care OT Referral for Hospital Discharge     Comments:    OT to eval and treat    Your provider has ordered home care - occupational therapy. If you have not been contacted within 2 days of your discharge please call the department phone number listed on the top of this document.    Home Care PT Referral for Hospital Discharge     Comments:    PT to eval and treat    Your provider has ordered home care - physical therapy. If you have not been contacted within 2 days of your discharge please call the department phone number listed on the top of this document.            Key Recommendations:  Discharging home with [facility]. PCP and [facility] heart appointments made.    Candida Drew    AVS/Discharge Summary is the source of truth; this is a helpful guide for improved communication of patient story

## 2018-05-05 NOTE — IP AVS SNAPSHOT
[facility]    [address] [place] [address]., [address]    [place] [address]    Phone:  [phone]                                       After Visit Summary   5/5/2018    Helene Gibbs    MRN: [id number]           After Visit Summary Signature Page     I have received my discharge instructions, and my questions have been answered. I have discussed any challenges I see with this plan with the nurse or doctor.    ..........................................................................................................................................  Patient/Patient Representative Signature      ..........................................................................................................................................  Patient Representative Print Name and Relationship to Patient    ..................................................               ................................................  Date                                            Time    ..........................................................................................................................................  Reviewed by Signature/Title    ...................................................              ..............................................  Date                                                            Time

## 2018-05-06 PROBLEM — I21.4 NSTEMI (NON-ST ELEVATED MYOCARDIAL INFARCTION) (H): Status: ACTIVE | Noted: 2018-05-06

## 2018-05-06 PROBLEM — R07.89 CHEST PRESSURE: Status: ACTIVE | Noted: 2018-05-06

## 2018-05-06 LAB
APTT PPP: 47 SEC (ref 22–37)
APTT PPP: 56 SEC (ref 22–37)
ERYTHROCYTE [DISTWIDTH] IN BLOOD BY AUTOMATED COUNT: 22.5 % (ref 10–15)
GLUCOSE BLDC GLUCOMTR-MCNC: 124 MG/DL (ref 70–99)
GLUCOSE BLDC GLUCOMTR-MCNC: 153 MG/DL (ref 70–99)
GLUCOSE BLDC GLUCOMTR-MCNC: 156 MG/DL (ref 70–99)
GLUCOSE BLDC GLUCOMTR-MCNC: 161 MG/DL (ref 70–99)
GLUCOSE BLDC GLUCOMTR-MCNC: 185 MG/DL (ref 70–99)
HCT VFR BLD AUTO: 37.2 % (ref 35–47)
HGB BLD-MCNC: 11.7 G/DL (ref 11.7–15.7)
LMWH PPP CHRO-ACNC: 0.43 IU/ML
LMWH PPP CHRO-ACNC: 0.52 IU/ML
MCH RBC QN AUTO: 28.3 PG (ref 26.5–33)
MCHC RBC AUTO-ENTMCNC: 31.5 G/DL (ref 31.5–36.5)
MCV RBC AUTO: 90 FL (ref 78–100)
PLATELET # BLD AUTO: 209 10E9/L (ref 150–450)
RBC # BLD AUTO: 4.13 10E12/L (ref 3.8–5.2)
TROPONIN I SERPL-MCNC: 0.03 UG/L (ref 0–0.04)
TROPONIN I SERPL-MCNC: 0.06 UG/L (ref 0–0.04)
TROPONIN I SERPL-MCNC: 0.08 UG/L (ref 0–0.04)
WBC # BLD AUTO: 5.3 10E9/L (ref 4–11)

## 2018-05-06 PROCEDURE — 85730 THROMBOPLASTIN TIME PARTIAL: CPT | Performed by: INTERNAL MEDICINE

## 2018-05-06 PROCEDURE — 99233 SBSQ HOSP IP/OBS HIGH 50: CPT | Performed by: INTERNAL MEDICINE

## 2018-05-06 PROCEDURE — G0378 HOSPITAL OBSERVATION PER HR: HCPCS

## 2018-05-06 PROCEDURE — 25000128 H RX IP 250 OP 636: Performed by: INTERNAL MEDICINE

## 2018-05-06 PROCEDURE — 99221 1ST HOSP IP/OBS SF/LOW 40: CPT | Performed by: INTERNAL MEDICINE

## 2018-05-06 PROCEDURE — 00000146 ZZHCL STATISTIC GLUCOSE BY METER IP

## 2018-05-06 PROCEDURE — 85027 COMPLETE CBC AUTOMATED: CPT | Performed by: INTERNAL MEDICINE

## 2018-05-06 PROCEDURE — 36415 COLL VENOUS BLD VENIPUNCTURE: CPT | Performed by: INTERNAL MEDICINE

## 2018-05-06 PROCEDURE — A9270 NON-COVERED ITEM OR SERVICE: HCPCS | Mod: GY | Performed by: INTERNAL MEDICINE

## 2018-05-06 PROCEDURE — 99207 ZZC CDG-MDM COMPONENT: MEETS MODERATE - UP CODED: CPT | Performed by: INTERNAL MEDICINE

## 2018-05-06 PROCEDURE — 25000131 ZZH RX MED GY IP 250 OP 636 PS 637: Mod: GY | Performed by: INTERNAL MEDICINE

## 2018-05-06 PROCEDURE — 84484 ASSAY OF TROPONIN QUANT: CPT | Performed by: INTERNAL MEDICINE

## 2018-05-06 PROCEDURE — 21000001 ZZH R&B HEART CARE

## 2018-05-06 PROCEDURE — 93005 ELECTROCARDIOGRAM TRACING: CPT

## 2018-05-06 PROCEDURE — 85520 HEPARIN ASSAY: CPT | Performed by: INTERNAL MEDICINE

## 2018-05-06 PROCEDURE — 25000132 ZZH RX MED GY IP 250 OP 250 PS 637: Mod: GY | Performed by: INTERNAL MEDICINE

## 2018-05-06 PROCEDURE — 93010 ELECTROCARDIOGRAM REPORT: CPT | Performed by: INTERNAL MEDICINE

## 2018-05-06 RX ORDER — ASPIRIN 81 MG/1
81 TABLET ORAL DAILY
Status: DISCONTINUED | OUTPATIENT
Start: 2018-05-06 | End: 2018-05-08 | Stop reason: HOSPADM

## 2018-05-06 RX ORDER — PROCHLORPERAZINE 25 MG
12.5 SUPPOSITORY, RECTAL RECTAL EVERY 12 HOURS PRN
Status: DISCONTINUED | OUTPATIENT
Start: 2018-05-06 | End: 2018-05-08 | Stop reason: HOSPADM

## 2018-05-06 RX ORDER — NICOTINE POLACRILEX 4 MG
15-30 LOZENGE BUCCAL
Status: DISCONTINUED | OUTPATIENT
Start: 2018-05-06 | End: 2018-05-08 | Stop reason: HOSPADM

## 2018-05-06 RX ORDER — ACETAMINOPHEN 650 MG/1
650 SUPPOSITORY RECTAL EVERY 4 HOURS PRN
Status: DISCONTINUED | OUTPATIENT
Start: 2018-05-06 | End: 2018-05-08 | Stop reason: HOSPADM

## 2018-05-06 RX ORDER — PROCHLORPERAZINE MALEATE 5 MG
5 TABLET ORAL EVERY 6 HOURS PRN
Status: DISCONTINUED | OUTPATIENT
Start: 2018-05-06 | End: 2018-05-08 | Stop reason: HOSPADM

## 2018-05-06 RX ORDER — ALUMINA, MAGNESIA, AND SIMETHICONE 2400; 2400; 240 MG/30ML; MG/30ML; MG/30ML
30 SUSPENSION ORAL EVERY 4 HOURS PRN
Status: DISCONTINUED | OUTPATIENT
Start: 2018-05-06 | End: 2018-05-08 | Stop reason: HOSPADM

## 2018-05-06 RX ORDER — POLYETHYLENE GLYCOL 3350 17 G/17G
17 POWDER, FOR SOLUTION ORAL DAILY PRN
Status: DISCONTINUED | OUTPATIENT
Start: 2018-05-06 | End: 2018-05-08 | Stop reason: HOSPADM

## 2018-05-06 RX ORDER — ACETAMINOPHEN 650 MG/1
650 SUPPOSITORY RECTAL EVERY 4 HOURS PRN
Status: DISCONTINUED | OUTPATIENT
Start: 2018-05-06 | End: 2018-05-06

## 2018-05-06 RX ORDER — BISACODYL 10 MG
10 SUPPOSITORY, RECTAL RECTAL DAILY PRN
Status: DISCONTINUED | OUTPATIENT
Start: 2018-05-06 | End: 2018-05-08 | Stop reason: HOSPADM

## 2018-05-06 RX ORDER — LIDOCAINE 40 MG/G
CREAM TOPICAL
Status: DISCONTINUED | OUTPATIENT
Start: 2018-05-06 | End: 2018-05-08 | Stop reason: HOSPADM

## 2018-05-06 RX ORDER — LISINOPRIL 10 MG/1
10 TABLET ORAL 2 TIMES DAILY
Status: DISCONTINUED | OUTPATIENT
Start: 2018-05-06 | End: 2018-05-08 | Stop reason: HOSPADM

## 2018-05-06 RX ORDER — ONDANSETRON 2 MG/ML
4 INJECTION INTRAMUSCULAR; INTRAVENOUS EVERY 6 HOURS PRN
Status: DISCONTINUED | OUTPATIENT
Start: 2018-05-06 | End: 2018-05-08 | Stop reason: HOSPADM

## 2018-05-06 RX ORDER — ACETAMINOPHEN 325 MG/1
650 TABLET ORAL EVERY 4 HOURS PRN
Status: DISCONTINUED | OUTPATIENT
Start: 2018-05-06 | End: 2018-05-06

## 2018-05-06 RX ORDER — METOPROLOL TARTRATE 25 MG/1
25 TABLET, FILM COATED ORAL 2 TIMES DAILY
Status: DISCONTINUED | OUTPATIENT
Start: 2018-05-06 | End: 2018-05-08 | Stop reason: HOSPADM

## 2018-05-06 RX ORDER — DEXTROSE MONOHYDRATE 25 G/50ML
25-50 INJECTION, SOLUTION INTRAVENOUS
Status: DISCONTINUED | OUTPATIENT
Start: 2018-05-06 | End: 2018-05-08 | Stop reason: HOSPADM

## 2018-05-06 RX ORDER — LIDOCAINE 40 MG/G
CREAM TOPICAL
Status: DISCONTINUED | OUTPATIENT
Start: 2018-05-06 | End: 2018-05-06

## 2018-05-06 RX ORDER — FUROSEMIDE 40 MG
40 TABLET ORAL DAILY
Status: DISCONTINUED | OUTPATIENT
Start: 2018-05-06 | End: 2018-05-08 | Stop reason: HOSPADM

## 2018-05-06 RX ORDER — IPRATROPIUM BROMIDE AND ALBUTEROL SULFATE 2.5; .5 MG/3ML; MG/3ML
3 SOLUTION RESPIRATORY (INHALATION)
Status: DISCONTINUED | OUTPATIENT
Start: 2018-05-06 | End: 2018-05-06

## 2018-05-06 RX ORDER — GABAPENTIN 300 MG/1
300 CAPSULE ORAL DAILY
Status: DISCONTINUED | OUTPATIENT
Start: 2018-05-06 | End: 2018-05-08 | Stop reason: HOSPADM

## 2018-05-06 RX ORDER — AMOXICILLIN 250 MG
2 CAPSULE ORAL 2 TIMES DAILY PRN
Status: DISCONTINUED | OUTPATIENT
Start: 2018-05-06 | End: 2018-05-08 | Stop reason: HOSPADM

## 2018-05-06 RX ORDER — NALOXONE HYDROCHLORIDE 0.4 MG/ML
.1-.4 INJECTION, SOLUTION INTRAMUSCULAR; INTRAVENOUS; SUBCUTANEOUS
Status: DISCONTINUED | OUTPATIENT
Start: 2018-05-06 | End: 2018-05-06

## 2018-05-06 RX ORDER — IPRATROPIUM BROMIDE AND ALBUTEROL SULFATE 2.5; .5 MG/3ML; MG/3ML
3 SOLUTION RESPIRATORY (INHALATION) EVERY 4 HOURS PRN
Status: DISCONTINUED | OUTPATIENT
Start: 2018-05-06 | End: 2018-05-08 | Stop reason: HOSPADM

## 2018-05-06 RX ORDER — SIMVASTATIN 10 MG
10 TABLET ORAL AT BEDTIME
Status: DISCONTINUED | OUTPATIENT
Start: 2018-05-06 | End: 2018-05-08 | Stop reason: HOSPADM

## 2018-05-06 RX ORDER — NITROGLYCERIN 0.4 MG/1
0.4 TABLET SUBLINGUAL EVERY 5 MIN PRN
Status: DISCONTINUED | OUTPATIENT
Start: 2018-05-06 | End: 2018-05-08 | Stop reason: HOSPADM

## 2018-05-06 RX ORDER — FUROSEMIDE 10 MG/ML
40 INJECTION INTRAMUSCULAR; INTRAVENOUS ONCE
Status: COMPLETED | OUTPATIENT
Start: 2018-05-06 | End: 2018-05-06

## 2018-05-06 RX ORDER — FLUTICASONE PROPIONATE 50 MCG
1-2 SPRAY, SUSPENSION (ML) NASAL EVERY EVENING
Status: DISCONTINUED | OUTPATIENT
Start: 2018-05-06 | End: 2018-05-08 | Stop reason: HOSPADM

## 2018-05-06 RX ORDER — ACETAMINOPHEN 325 MG/1
650 TABLET ORAL EVERY 4 HOURS PRN
Status: DISCONTINUED | OUTPATIENT
Start: 2018-05-06 | End: 2018-05-08 | Stop reason: HOSPADM

## 2018-05-06 RX ORDER — NALOXONE HYDROCHLORIDE 0.4 MG/ML
.1-.4 INJECTION, SOLUTION INTRAMUSCULAR; INTRAVENOUS; SUBCUTANEOUS
Status: DISCONTINUED | OUTPATIENT
Start: 2018-05-06 | End: 2018-05-08 | Stop reason: HOSPADM

## 2018-05-06 RX ORDER — ONDANSETRON 4 MG/1
4 TABLET, ORALLY DISINTEGRATING ORAL EVERY 6 HOURS PRN
Status: DISCONTINUED | OUTPATIENT
Start: 2018-05-06 | End: 2018-05-08 | Stop reason: HOSPADM

## 2018-05-06 RX ORDER — AMOXICILLIN 250 MG
1 CAPSULE ORAL 2 TIMES DAILY PRN
Status: DISCONTINUED | OUTPATIENT
Start: 2018-05-06 | End: 2018-05-08 | Stop reason: HOSPADM

## 2018-05-06 RX ORDER — HYDROXYZINE PAMOATE 25 MG/1
25 CAPSULE ORAL 2 TIMES DAILY PRN
Status: DISCONTINUED | OUTPATIENT
Start: 2018-05-06 | End: 2018-05-08 | Stop reason: HOSPADM

## 2018-05-06 RX ORDER — NICOTINE 21 MG/24HR
1 PATCH, TRANSDERMAL 24 HOURS TRANSDERMAL DAILY
Status: DISCONTINUED | OUTPATIENT
Start: 2018-05-06 | End: 2018-05-08 | Stop reason: HOSPADM

## 2018-05-06 RX ADMIN — FUROSEMIDE 40 MG: 10 INJECTION, SOLUTION INTRAVENOUS at 15:38

## 2018-05-06 RX ADMIN — RANITIDINE 150 MG: 150 TABLET ORAL at 21:35

## 2018-05-06 RX ADMIN — NITROGLYCERIN 0.4 MG: 0.4 TABLET SUBLINGUAL at 13:29

## 2018-05-06 RX ADMIN — SIMVASTATIN 10 MG: 10 TABLET, FILM COATED ORAL at 21:35

## 2018-05-06 RX ADMIN — NITROGLYCERIN 0.4 MG: 0.4 TABLET SUBLINGUAL at 13:23

## 2018-05-06 RX ADMIN — HEPARIN SODIUM 950 UNITS/HR: 10000 INJECTION, SOLUTION INTRAVENOUS at 12:46

## 2018-05-06 RX ADMIN — NITROGLYCERIN 0.4 MG: 0.4 TABLET SUBLINGUAL at 13:15

## 2018-05-06 RX ADMIN — LISINOPRIL 10 MG: 10 TABLET ORAL at 09:50

## 2018-05-06 RX ADMIN — FLUTICASONE PROPIONATE 2 SPRAY: 50 SPRAY, METERED NASAL at 21:33

## 2018-05-06 RX ADMIN — HEPARIN SODIUM 800 UNITS/HR: 10000 INJECTION, SOLUTION INTRAVENOUS at 03:06

## 2018-05-06 RX ADMIN — LISINOPRIL 10 MG: 10 TABLET ORAL at 20:29

## 2018-05-06 RX ADMIN — FUROSEMIDE 40 MG: 40 TABLET ORAL at 09:50

## 2018-05-06 RX ADMIN — ASPIRIN 81 MG: 81 TABLET, COATED ORAL at 09:49

## 2018-05-06 RX ADMIN — METOPROLOL TARTRATE 25 MG: 25 TABLET ORAL at 15:11

## 2018-05-06 RX ADMIN — METOPROLOL TARTRATE 25 MG: 25 TABLET ORAL at 20:29

## 2018-05-06 RX ADMIN — NICOTINE 1 PATCH: 21 PATCH, EXTENDED RELEASE TRANSDERMAL at 09:50

## 2018-05-06 RX ADMIN — INSULIN ASPART 1 UNITS: 100 INJECTION, SOLUTION INTRAVENOUS; SUBCUTANEOUS at 15:39

## 2018-05-06 RX ADMIN — INSULIN ASPART 1 UNITS: 100 INJECTION, SOLUTION INTRAVENOUS; SUBCUTANEOUS at 18:53

## 2018-05-06 ASSESSMENT — ACTIVITIES OF DAILY LIVING (ADL)
SWALLOWING: 0-->SWALLOWS FOODS/LIQUIDS WITHOUT DIFFICULTY
DRESS: 0-->INDEPENDENT
RETIRED_EATING: 0-->INDEPENDENT
RETIRED_COMMUNICATION: 0-->UNDERSTANDS/COMMUNICATES WITHOUT DIFFICULTY
TRANSFERRING: 1-->ASSISTIVE EQUIPMENT
AMBULATION: 1-->ASSISTIVE EQUIPMENT
TOILETING: 0-->INDEPENDENT
BATHING: 0-->INDEPENDENT

## 2018-05-06 ASSESSMENT — PAIN DESCRIPTION - DESCRIPTORS: DESCRIPTORS: TIGHTNESS

## 2018-05-06 NOTE — PROGRESS NOTES
[facility]    Hospitalist Progress Note    Date of Service (when I saw the patient): 05/06/2018    Assessment & Plan   Helene Gibbs is a 83 year old female who was admitted on 5/5/2018 with chest pain, now with NSTEMI.    1. NSTEMI.  Trop elevated on admission to 0.038 to 0.079 to 0.060.  Minimal chest pain overnight.  Awaiting cardiology consult.  Continue asa, heparin drip, lisinopril, simvastatin.  Start metoprolol 25 mg bid.    2. Afib s/p alexandra ablation.  Eliquis on hold due to heparin drip.  Will start metoprolol.    3. DM2.  Hold glipizide.  Continue ISS.  HbA1c 6.8% from 3/22/18.    4. COPD.  Continue Duonebs qid    5. CKD stage 3b.  Continue lisinopril.  Cr stable at 1.27.    6. Nicotine dependency.  Encourage cessation.    7. Lymphedema.  Follow up with [facility].    8. Neuropathy.  Continue gabapentin.    # Pain Assessment:  Current Pain Score 5/6/2018   Patient currently in pain? denies   Pain score (0-10) -   Pain location -   Pain descriptors -   Helene pickett pain level was assessed and she currently denies pain.        DVT Prophylaxis: Heparin  Code Status: DNR/DNI    Disposition: Expected discharge in 1-3 days.    Caden Pickett  Text Page (7 am to 6 pm)    Interval History   The patient is resting comfortably in a chair.  She has minimal chest pain but denies dyspnea, nausea, lightheadedness, or diaphoresis.    -Data reviewed today: I reviewed all new labs and imaging results over the last 24 hours. I personally reviewed no images or EKG's today.    Physical Exam   Temp: 97.8  F (36.6  C) Temp src: Oral BP: 158/82 Pulse: 70 Heart Rate: 70 Resp: 18 SpO2: 97 % O2 Device: None (Room air)    Vitals:    05/05/18 2031 05/06/18 0047 05/06/18 0345   Weight: 74.8 kg (165 lb) 77.4 kg (170 lb 9.6 oz) 75.7 kg (166 lb 12.8 oz)     Vital Signs with Ranges  Temp:  [97.3  F (36.3  C)-98.1  F (36.7  C)] 97.8  F (36.6  C)  Pulse:  [70] 70  Heart Rate:  [70-90] 70  Resp:  [9-21] 18  BP:  (122-172)/(67-99) 158/82  SpO2:  [96 %-98 %] 97 %  I/O last 3 completed shifts:  In: -   Out: 2275 [Urine:2275]    Gen: Well nourished, well developed, alert and oriented x 3, no acute distressed  HEENT: Atraumatic, normocephalic  Lungs: Clear to ausculation without wheezes, rhonchi, or rales  Heart: Regular rate and rhythm, no murmurs, gallops, or rubs  GI: Bowel sound normal, no hepatosplenomegaly or masses  Lymph: No lymphadenopathy or edema  Skin: No rashes     Medications     HEParin 800 Units/hr (05/06/18 0800)       aspirin EC  81 mg Oral Daily     fluticasone  1-2 spray Both Nostrils QPM     furosemide  40 mg Oral Daily     gabapentin (NEURONTIN) capsule 300 mg  300 mg Oral Daily     insulin aspart  1-3 Units Subcutaneous TID AC     insulin aspart  1-3 Units Subcutaneous At Bedtime     ipratropium - albuterol 0.5 mg/2.5 mg/3 mL  3 mL Nebulization 4x daily     lisinopril  10 mg Oral BID     metoprolol tartrate  25 mg Oral BID     nicotine  1 patch Transdermal Daily     nicotine   Transdermal Q8H     [START ON 5/7/2018] nicotine   Transdermal Daily     ranitidine  150 mg Oral At Bedtime     simvastatin  10 mg Oral At Bedtime     sodium chloride (PF)  10 mL Intravenous Once     sodium chloride (PF)  3 mL Intracatheter Q8H     sodium chloride (PF)  3 mL Intracatheter Q8H       Data     Recent Labs  Lab 05/06/18  0615 05/06/18  0155 05/05/18  2040   WBC  --   --  5.8   HGB  --   --  11.6*   MCV  --   --  91   PLT  --   --  214   NA  --   --  138   POTASSIUM  --   --  3.7   CHLORIDE  --   --  106   CO2  --   --  23   BUN  --   --  13   CR  --   --  1.27*   ANIONGAP  --   --  9   GERARD  --   --  9.4   GLC  --   --  118*   ALBUMIN  --   --  3.9   PROTTOTAL  --   --  8.2   BILITOTAL  --   --  0.7   ALKPHOS  --   --  70   ALT  --   --  15   AST  --   --  17   LIPASE  --   --  67*   TROPI 0.060* 0.079* 0.038       Recent Results (from the past 24 hour(s))   XR Chest 2 Views    Narrative    XR CHEST 2 VW   5/5/2018 9:43  "PM     HISTORY: upper abdominal pain; \"heart heavy\";     COMPARISON: Film dated 8/9/2017    FINDINGS: Cardiac silhouette is enlarged. A left cardiac device is in  place.. Slight blunting of the costophrenic angles is again noted and  is unchanged.  The lungs are clear. The pulmonary vasculature is  normal.  Patient is status post vertebral plasties at T11 and T12..      Impression    IMPRESSION: Cardiomegaly.. No significant change.        JOHNATHAN LOWRY MD       "

## 2018-05-06 NOTE — ED NOTES
"[facility]  ED Nurse Handoff Report    ED Chief complaint: Chest Pain (Pt. brought from home with 2 day history of chest  pressure. +history of A-fib and taking Eloquis. Concurrent history of comp. FX in back. Pt. given nitro times 2 enroute with relief 8/10-2/10. LG=569. )      ED Diagnosis:   Final diagnoses:   Chest pressure       Code Status: Full Code per prior status, not addressed at this time    Allergies:   Allergies   Allergen Reactions     Ambien [Zolpidem Tartrate] Visual Disturbance     Hallucinations and fell out of bed at night.     Penicillins Hives     Definity      Caused a syncopal episode.     Sulfa Drugs Itching     Cymbalta Rash     Fluconazole Rash       Activity level - Baseline/Home:  Independent with use of rolling walker    Activity Level - Current:   Independent with use of rolling walker, she has her own here     Needed?: No    Isolation: No  Infection: Not Applicable    Bariatric?: No    Vital Signs:   Vitals:    05/05/18 2031   BP: 172/86   Pulse: 70   Resp: 18   Temp: 98.1  F (36.7  C)   TempSrc: Oral   SpO2: 97%   Weight: 74.8 kg (165 lb)   Height: 1.676 m (5' 6\")       Cardiac Rhythm: ,        Pain level:      Is this patient confused?: No     Patient Report: Initial Complaint: chest pressure x 2 days  Focused Assessment: pt reports she has had chest pressure for 2 days.  Pt on Eloquis, hx A-fib, CHF and CAD.  Hx compression fractures.  Pt brought in by EMS, was given Nitro x2 with some chest pressure relief.  PIV 22G right arm, difficult IV start.  Pt has not complained of chest pressure since arrival to ED, pt has eaten a courtesy meal.  Tests Performed: Labs, EKG, CXR  Abnormal Results: see results, troponin 0.038  Treatments provided:  mg given, Lasix 40 mg    Family Comments: none present    OBS brochure/video discussed/provided to patient: Yes    ED Medications:   Medications   aspirin chewable tablet 162 mg (162 mg Oral Given 5/5/18 2017) "   furosemide (LASIX) injection 40 mg (40 mg Intravenous Given 5/5/18 4807)       Drips infusing?:  No    For the majority of the shift this patient was Green.   Interventions performed were none.    Severe Sepsis OR Septic Shock Diagnosis Present: No      ED NURSE PHONE NUMBER: [phone]

## 2018-05-06 NOTE — PHARMACY-ADMISSION MEDICATION HISTORY
Admission medication history interview status for the 5/5/2018  admission is complete. See EPIC admission navigator for prior to admission medications     Medication history source reliability:Moderate    Actions taken by pharmacist (provider contacted, etc):Called  pharmacy to verify meds and Intern interviews patient    Additional medication history information not noted on PTA med list :None    Medication reconciliation/reorder completed by provider prior to medication history? No    Time spent in this activity: 15 min    Prior to Admission medications    Medication Sig Last Dose Taking? Auth Provider   albuterol (PROAIR HFA, PROVENTIL HFA, VENTOLIN HFA) 108 (90 BASE) MCG/ACT inhaler Inhale 2 puffs into the lungs every 4 hours as needed for shortness of breath / dyspnea or wheezing 5/5/2018 at Unknown time Yes Neisha Esparza MD   apixaban ANTICOAGULANT (ELIQUIS) 2.5 MG tablet Take 1 tablet (2.5 mg) by mouth 2 times daily 5/5/2018 at am Yes Neisha Esparza MD   fluticasone (FLONASE) 50 MCG/ACT spray Spray 1-2 sprays into both nostrils every evening 5/5/2018 at am Yes Reported, Patient   furosemide (LASIX) 20 MG tablet TAKE 1-2 TABS BY MOUTH ONCE DAILY. MAY REPEAT AFTER NOON IF NEEDED FOR WEIGHT GAIN/2+ EDEMA 5/4/2018 Yes Neisha Esparza MD   GlipiZIDE (GLUCOTROL PO) Take 5 mg by mouth every morning (before breakfast) 5/5/2018 at am Yes Reported, Patient   hydrocortisone (ANUSOL-HC) 2.5 % cream Place rectally 2 times daily  at prn Yes Neisha Esparza MD   hydrOXYzine (VISTARIL) 25 MG capsule Take 1 capsule (25 mg) by mouth 2 times daily as needed for itching  Yes Neisha Esaprza MD   LISINOPRIL PO Take 20 mg by mouth every morning 5/5/2018 at am Yes Unknown, Entered By History   LISINOPRIL PO Take 10 mg by mouth every evening 5/4/2018 Yes Unknown, Entered By History   magnesium hydroxide (MILK OF MAGNESIA) 400 MG/5ML suspension Take 30 mLs by mouth At Bedtime  5/5/2018 at Unknown time Yes Reported,  Patient   Multiple Vitamins-Minerals (PRESERVISION/LUTEIN) CAPS Take 2 capsules by mouth daily   Yes Reported, Patient   ondansetron (ZOFRAN ODT) 4 MG ODT tab Take 1 tablet (4 mg) by mouth every 6 hours as needed for nausea 5/5/2018 at Unknown time Yes Neisha Esparza MD   oxyCODONE-acetaminophen (PERCOCET) 5-325 MG per tablet Take 1 tablet by mouth 3 times daily as needed for severe pain maximum 6 tablet(s) per day 5/5/2018 at Unknown time Yes Neisha Esparza MD   ranitidine (ZANTAC) 150 MG tablet TAKE ONE TABLET BY MOUTH TWO TIMES A DAY 5/5/2018 at am Yes Neisha Esparza MD   simvastatin (ZOCOR) 10 MG tablet TAKE 1 TABLET (10 MG) BY MOUTH AT BEDTIME  Yes Neisha Esparza MD   tiotropium (SPIRIVA) 18 MCG capsule Inhale 18 mcg into the lungs every evening 5/5/2018 at am Yes Reported, Patient   ACCU-CHEK SMARTVIEW test strip USE 1 STRIP BY IN VITRO ROUTE 2 TIMES DAILY OR AS DIRECTED   Neisha Esparza MD   BETA BLOCKER NOT PRESCRIBED, INTENTIONAL, Beta Blocker not prescribed intentionally due to Other: due to ablation procedure   Neisha Esparza MD

## 2018-05-06 NOTE — PLAN OF CARE
Problem: Patient Care Overview  Goal: Plan of Care/Patient Progress Review  Outcome: No Change  Admitted to the cardiac unit close to 0400. Alert and oriented x 4. On room air. No SOB. No chest pain. Vitals stable. Afib with 100% ventricular pacing at 70 bpm. Responding to lasix given earlier with good urine output. On heparin drip started at 0500.

## 2018-05-06 NOTE — PROGRESS NOTES
RECEIVING UNIT ED HANDOFF REVIEW    ED Nurse Handoff Report was reviewed by: Earlene Wilson on May 5, 2018 at 11:54 PM

## 2018-05-06 NOTE — PROGRESS NOTES
"Pt A&Ox4; anxious at times but cooperative. Slightly hypertensive but stable. C/o chest/epigastric tightness this afternoon without radiation, nausea, or vomiting. Pt had c/o \"belching\".  Ekg done; Nitro x3 given without changes (see MD notification)/pt evaluated by cardiology. Currently, denies pain and verbalizes resolution of her epigastric tightness. Tele shows V-paced rhythm. Continues on heparin gtt. BLE edematous; pt dyspneic on exertion. On PO diuretic. Pt also received IV lasix x1.  Voiding well. Had c/o constipation this AM but this afternoon, had loose BM with bright blood x1/ MD aware (see notifications). Hemoglobin stable. Up with SBA/ walker. Ambulated in the lane. Plan for Echo and possible Coronary Angio tomorrow; NPO after midnight; Will continue to monitor.   "

## 2018-05-06 NOTE — CONSULTS
Consult Date:  05/06/2018      CARDIOLOGY CONSULTATION      REQUESTING PHYSICIAN:  Dr. Reyes.      CHIEF COMPLAINT:  Chest tightness, dyspnea on exertion and peripheral edema slowly worsening over the last several days.      HISTORY OF PRESENT ILLNESS:  Ms. Gibbs is an 83-year-old woman with known cardiac history including permanent atrial fibrillation/flutter, status post permanent pacemaker placement and AV node ablation, peripheral arterial disease status post stenting, and a prior diagnosis of heart failure with preserved ejection fraction.  She presented with what she tells me is worsening dyspnea on exertion.  She also noticed a chest tightness with this.  She has also noted significant peripheral edema.  She has difficulty pinning down the symptoms and especially their timeframe, but thinks this has occurred over the last several days.  She denies that she has truly had any chest pain, but feels that this is more of a substernal tightness.  This definitely was worse over the last day or so.  She received some diuresis with IV Lasix 40 mg since being here and this has improved some of her symptoms including the dyspnea on exertion, which she says is significantly better already.      The patient does not have known coronary artery disease, but does have known peripheral arterial disease and has required stenting in the past.  She had an echo in March that showed a normal EF at 50%-55% with moderate TR and mild pulmonary hypertension.  She has known atrial fibrillation/flutter.  She is paced 100% of the time with prior AV node ablation.      The patient also had an anemia last year that was of an unclear cause.  She apparently had a GI workup that was negative.  Ultimately, she was treated with iron, which has improved the anemia.  Notably, today the nurse noted that the patient had bright red blood in her stool mixed with the stool.  Hemoglobin is fortunately stable.      Further evaluation here has included  an ECG which shows atrial flutter with a paced ventricular rhythm, no ischemic changes are seen.  She has also had troponins checked, which are elevated but only very slightly so.  The first troponin was 0.038 and 0.079 and down to 0.060.  I rechecked one this afternoon that was down even further to 0.031, this is despite the fact that the patient has intermittently complained of the same chest tightness last night and early this morning.  The BNP on admission was elevated at 6738, creatinine is also elevated at 1.27.  This appears to be her baseline in the setting of chronic kidney disease.      Currently, the patient says she feels relatively well.  Her dyspnea has improved with the diuresis.  She does not currently have chest tightness at the time I was talking to her, but has reported it intermittently to the nursing staff.  She has no significant peripheral edema that has worsened over the last few days.      For the elevated troponins the patient was started on a heparin drip, which is being continued now.  She is also on aspirin.  Her p.o. home Lasix was continued, but she has also received IV Lasix as mentioned.  She is on lisinopril which is a home medication and was started on metoprolol.  She has also been continued on simvastatin.      REVIEW OF SYSTEMS:  A 10-point review of systems negative except as discussed in the HPI.      PAST MEDICAL HISTORY:   1. HFpEF.   2. Permanent atrial fibrillation/flutter.   3. Status post AV node ablation and permanent pacemaker placement.   4. Anemia, iron deficiency, unclear if bleeding source.     5. PAD with prior stenting.      SOCIAL HISTORY:  The patient still smokes.      PHYSICAL EXAMINATION:   VITAL SIGNS:  Pulse of 70, blood pressure 130/65, satting 96% on room air.   HEART:  Regular rate and rhythm.  No significant murmur is appreciated.   LUNGS:  Significant crackles bilaterally.   EXTREMITIES:  1+ peripheral edema bilaterally.   MENTAL:  Alert and oriented,  normal conversation.   NEUROLOGIC:  Moving all extremities.      ASSESSMENT AND PLAN:   1.  Acute exacerbation of chronic heart failure with preserved ejection fraction.   2.  Possible mild true NSTEMI.   3.  Bright red blood in the stool, possibly hemorrhoids versus lower GI bleeding.   4.  Chronic kidney disease.        Mrs. Gibbs clearly has symptoms of heart failure.  This would be an acute exacerbation as she has not previously been particularly symptomatic, although she has been on longstanding Lasix.  She has leg swelling and dyspnea on exertion as well as chest tightness that have improved with diuresis.  Her chest pain has responded to nitro somewhat previously at times.  Her troponin is slightly elevated and did have a significant trend upward, although it is trending down now.  It is not clear to me at this point if she had a true coronary event that is leading to some of this exacerbation or if the heart failure exacerbation is leading to the slight bump in the troponin.  Either way, I think we should continue to diurese the patient.  I have ordered another dose of IV Lasix tonight.  She certainly sounds like she has a lot of fluid in her lungs.      The further evaluation of the possible NSTEMI is less clear.  I think for now it is reasonable to continue her on heparin.  Ultimately, we may proceed with a coronary angiogram tomorrow.  I did discuss this with her and she should be n.p.o. after midnight.  That being said, the troponin is very slightly elevated and she does have kidney disease already.  I think it would be prudent to repeat a transthoracic echocardiogram tomorrow to see if there are any significant changes.  If the patient has no changes in her echocardiogram and improves with further diuresis, then we could forego an angiogram and consider either a stress test or just medical management alone.      The other issue that compounds the risks of the angiogram is that the patient has some  blood in her stool.  What we are seeing now may just be related to hemorrhoids.  Her hemoglobin has not dropped at this point.  We will recheck that tomorrow.  If we do see that she is having significant bleeding, then we will likely need to stop the heparin and I would be very hesitant to proceed with catheterization with the potential need for further antiplatelets and anticoagulants.      Therefore, at this point I think we should continue to diurese the patient and continue heparin.  We can get a transthoracic echocardiogram tomorrow and reevaluate her symptoms and labs.  If appropriate, then we can proceed with an angiogram versus proceeding with medical management plus or minus stress testing.      Thank you for the consult.  We will continue to follow.         SHANNA PABLO MD             D: 2018   T: 2018   MT: JENNIFER      Name:     PATRICIA BOBO   MRN:      [id number]        Account:       [id number]   :      1935           Consult Date:  2018      Document: [id number]

## 2018-05-06 NOTE — PLAN OF CARE
Problem: Patient Care Overview  Goal: Plan of Care/Patient Progress Review  Outcome: No Change  A&Ox4.  AVSS.  Tele 100% V Paced.  Denies pain.  Saline locked.  Troponin at 0155  0.079.  Pt DNR/DNI.  Voiding frequently due to getting Lasix .   .

## 2018-05-06 NOTE — PLAN OF CARE
Problem: Patient Care Overview  Goal: Plan of Care/Patient Progress Review  PRIMARY DIAGNOSIS: CHEST PAIN  OUTPATIENT/OBSERVATION GOALS TO BE MET BEFORE DISCHARGE:  1. Ruled out acute coronary syndrome (negative or stable Troponin):  Yes  2. Pain Status: Pain free.  3. Appropriate provocative testing performed: Yes  - Stress Test Procedure: Lexiscan  - Interpretation of cardiac rhythm per telemetry tech:     4. Cleared by Consultants (if applicable):No  5. Return to near baseline physical activity: No  Discharge Planner Nurse   Safe discharge environment identified: No  Barriers to discharge: Yes       Entered by: Earlene Wilson 05/06/2018 1:16 AM     Please review provider order for any additional goals.   Nurse to notify provider when observation goals have been met and patient is ready for discharge.

## 2018-05-06 NOTE — ED NOTES
Bed: ED22  Expected date: 5/5/18  Expected time: 8:20 PM  Means of arrival: Ambulance  Comments:  Bhakti M2 83F cardiac symptoms

## 2018-05-06 NOTE — ED PROVIDER NOTES
"  History     Chief Complaint:  Abdominal pain and \"heart heaviness\"    HPI   Helene Gibbs is a 83 year old female with a complex past medical history including CAD, atrial fibrillation s/p pacemaker on apixaban, COPD, CHF, and multiple other medical comorbidities who presents via EMS for evaluation of upper abdominal pain and \"heart heaviness\".  Patient reports that she has had intermittent upper abdominal pressure for the last 1 week.  Discomfort is 3/10 on pain scale.  She also notes increased swelling in her lower extremities over the last 2 weeks.  She has been taking her as needed Lasix over the last few days with exception of today and notes significant increase in urinary frequency.  Today patient noticed increased heaviness in her chest and called EMS.  EMS provided to nitro with noted improvement.  She was seen in vascular clinic yesterday secondary to history of peripheral vascular disease and was noted to only have mild arterial insufficiency which was improved as compared to previous.  She notes compliance with her Eliquis.  No previous reported history of coronary artery disease.  Patient continues to smoke cigarettes.      Allergies:  Ambien [Zolpidem Tartrate]  Penicillins  Definity  Sulfa Drugs  Cymbalta  Fluconazole     Medications:    apixaban  Furosemide  Gabapentin  Glipizide  Simvastatin  Oxycodone  Lisinopril  Hydroxyzine  Albuterol  Tiotropium     Past Medical History:    Pulmonary nodule  Mild cognitive impairment  Tubular adenoma  Hx GI bleed  Chronic pain  Tricuspid regurgitation  atrial fibrillation   Osteoporosis  Obesity  DM type II  CKD stage III  Adjustment reaction with anxiety and depression  Hx C-diff colitis 11/2012  GERD  COPD  CAD  CHF  Ca, breast  Bell's palsy  Arthritis  iron deficiency anemia   Cholelithiasis    pulmonary hypertension   Tobacco abuse   Lumbar spinal stenosis   Hypertension  Hyperlipidemia     Past Surgical History:    Pacemaker placement  Breast " "lumpectomy  CHARLIE, bilateral      Family History:    hypertension   CAD    Social History:  Current 0.25ppd tobacco smoker x45 years. Non-drinker.  Marital Status:   [5]       Review of Systems   Constitutional: Negative for fever.   Respiratory: Positive for shortness of breath.    Cardiovascular: Positive for chest pain and leg swelling.   Gastrointestinal: Positive for abdominal pain and diarrhea. Negative for nausea and vomiting.   All other systems reviewed and are negative.      Physical Exam     Patient Vitals for the past 24 hrs:   BP Temp Temp src Pulse Resp SpO2 Height Weight   05/05/18 2031 172/86 98.1  F (36.7  C) Oral 70 18 97 % 1.676 m (5' 6\") 74.8 kg (165 lb)        Physical Exam  General: Alert and cooperative with exam. Patient in mild distress. Normal mentation.  Head:  Scalp is NC/AT  Eyes:  No scleral icterus, PERRL  ENT:  The external nose and ears are normal. The oropharynx is normal and without erythema; mucus membranes are moist. Uvula midline, no evidence of deep space infection.  Neck:  Normal range of motion without rigidity.  CV:  Regular rate and rhythm    No pathologic murmur   Resp:  Breath sounds are clear bilaterally    Non-labored, no retractions or accessory muscle use  GI:  Abdomen is soft, no distension, mild epigastric tenderness. No peritoneal signs.  Overweight  MS:  +1 pitting edema lower extremities, chronic asymmetric swelling  Skin:  Warm and dry, No rash or lesions noted.  Neuro: Oriented x 3. No gross motor deficits.         Emergency Department Course   ECG:  ECG (20:38:25):  Indication: chest pain    Rate 70 bpm. IA interval *. QRS duration 146. QT/QTc 446/481. P-R-T axes *, -74, 94.   Ventricular-paced rhythm  abnormal ECG  agree with computer interpretation  No significant change when compared to EKG dated 11/6/13.    Interpreted at 2049 by Antonio Givens DO    Imaging:  Radiographic findings were communicated with the patient who voiced understanding of " "the findings.  XR Chest 2 views:   No significant change Results per Radiology.      Laboratory:  Laboratory findings were communicated with the patient who voiced understanding of the findings.  CBC w/diff: Hgb 11.6 (L),  o/w WNL (WBC 5.8, Plt 214)  CMP: Glu 118 (H), Cr 1.27 (H), eGFR 40 (L), o/w WNL  Lipase: 67 (L)  Troponin-i: 0.038   BNP: 6738    Interventions:  Medications   aspirin chewable tablet 162 mg (162 mg Oral Given 5/5/18 2311)   furosemide (LASIX) injection 40 mg (40 mg Intravenous Given 5/5/18 2317)       Impression & Plan      Emergency Department Course/  Medical Decision Making:  Helene Gibbs is a 83 year old female who presents with \"heart heaviness\" and upper abdominal pain.  Patient's medical history and records were reviewed.  Initial consideration for, not limited to, ACS/MI, esophageal spasm/GERD, pancreatitis, gastritis, biliary pathology, MSK pain, angina/unstable angina, CHF exacerbation, among others.  Labs, EKG, and imaging was obtained. Chest x-ray demonstrates cardiomegaly without significant change from previous.  EKG shows paced rhythm without significant change from previous.  UA without any evidence of infection.  Labs demonstrate elevated BNP (6738; provided 40 mg IV Lasix for possible CHF exacerbation), chronic/mild renal insufficiency (creatinine 1.27), and detectable though not significantly elevated troponin (0.038; troponins have not been detectable in the past).  Patient did receive nitro prior to ED arrival with improvement in symptoms.  She has multiple risk factors for coronary artery disease and as there is concern for ACS she will be admitted to observation with the hospitalist service (Dr. Reyes) for continued care.  Provided 162 mg aspirin.  No indication for abdominal imaging based on history and physical exam as well as lab findings at this time.  Patient remained stable throughout ED course.    Diagnosis:    ICD-10-CM    1. Chest pressure R07.89    2. Upper " abdominal pain R10.10      Disposition:   Observation with hospitalist service      Scribe Disclosure:  I, Diomedes Mclain, am serving as a scribe at 9:02 PM on 5/5/2018 to document services personally performed by Antonio Givens DO based on my observations and the provider's statements to me.    Diomedes Mclain  5/5/2018   EMERGENCY DEPARTMENT      Antonio Givens DO  05/05/18 7157

## 2018-05-06 NOTE — CONSULTS
"84 yo F w permanent AF/AFL, joe s/p PPM, anemia, PAD, and dx of HFpEF. Presents with rapidly worsening dyspnea, chest tightness, and leg swelling. Intermittent tightness, very slightly increased trop. Blood in stool today. Hb stable.     Unclear if this is just HF exacerbation or true NSTEMI. Trop is very minimal and going down despite intermittent \"tightness\". Most sxs are HF related and improving with diuresis. TTE tomorrow. Repeat trop. Consider cath. NPO after midnight.  "

## 2018-05-06 NOTE — H&P
[facility]    History and Physical  Hospitalist       Date of Admission:  5/5/2018    Assessment & Plan   Helene Gibbs is a 83 year old female who presents with epigastric versus substernal chest pressure    Chest pain: Patient with atypical epigastric/chest discomfort.  Onset of chest discomfort at rest, no exertional component noted by patient, improved/completely resolved following oral nitroglycerin.  No prior history of coronary artery disease, troponin at the upper end of normal limits.  Known atrial fibrillation/flutter with V paced rhythm.  -Cardiac telemetry  -Nitroglycerin as needed  -Completing troponin trend  -Received 162 mg aspirin in the emergency permit, additional 81 mg aspirin daily pending troponin trend  -Continue prior to admission Eliquis  -Continue prior to admission simvastatin 10 mg at bedtime  -Continue prior to admission ranitidine 150 mg twice daily  -Nuclear medicine stress test pending troponin trend.  Pending symptoms and negative troponin trend, could also consider discharge with outpatient stress test given this is currently a weekend.  If positive troponin, of course, this would be discontinued and patient transferred to inpatient status for cardiology recommendations for non-ST elevation myocardial infarction.  -Not repeating TTE at this time.  Last echo as of 2/1/18 with mild pulmonary hypertension, ejection fraction of 50-55%, normal IVC, moderate tricuspid regurgitation with severe biatrial enlargement.  V paced.  Very limited utility to repeating TTE  -Discussed smoking cessation with patient.  Will order nicotine patch    Atrial flutter/fibrillation, permanent: Patient with atrial flutter and slow ventricular rate w/ AV alexandra ablation and post permanent pacemaker placement  -Cardiac function  -Continue prior to admission Eliquis    Type 2 diabetes with nephropathy, neuropathy: Last hemoglobin A1c of 6.8 3/22/18.  -Medium dose sliding scale insulin  available as needed  -Holding prior to admission glipizide twice daily  -Gabapentin 300 mg daily for neuropathy  -Continue prior to admission lisinopril    COPD:  -Duo nebs scheduled 4 times daily  -Continue prior to admission fluticasone    Chronic kidney disease, stage III: Creatinine of 1.27.  Appears baseline with creatinine typically in the 1.3 range.  History of type 2 diabetes with mild proteinuria.  -Continue prior to admission lisinopril    Lymphedema: Patient with chronic lymphedema, worsening.  Potentially related to pulmonary hypertension, as patient has a history of mild pulmonary hypertension, though anticipate a significant contribution from the fact that patient has neuropathy and will get up at night to ambulate before sitting in her recliner and falling asleep several hours per night.  -Patient counseled on lower externally elevation and avoiding sleeping in a recliner  -Continue outpatient lymphedema follow-up.  Patient is already followed in lymphedema clinic.    Iron deficiency anemia: Improving.  Hemoglobin of 9.9 as of 4/17, currently 11.6.  As of 4/17 follow-up, iron saturation of 6%.    Nicotine dependence: Patient continues to smoke  -Nicotine patch ordered  -Discussed cessation    # Pain Assessment:  Current Pain Score 4/6/2018   Patient currently in pain? -   Pain score (0-10) 3   Pain location -   Pain descriptors -   - Helene is experiencing pain due to epigastric/chest pain. Was described as 3/10 pressure under sternum/breast, now 0/10 following nitroglycerin administration. Pain management was discussed and the plan was created in a collaborative fashion.  Helene's response to the current recommendations: engaged  - Please see the plan for pain management as documented above    DVT Prophylaxis: Continue prior to admission Eliquis    Code Status: DNR / DNI.  Note that this is a change from prior CODE STATUS.  Patient states that full CODE STATUS is inappropriate from prior, is  uncertain if she filled out something incorrectly in her healthcare directive.    Disposition: Expected discharge potentially 5/6/18 following negative troponin trend and no further chest pain with plan for outpatient stress test versus 5/7/18 following Lexiscan.    [facility]    Primary Care Physician   Neisha Esparza    Chief Complaint   Epigastric/chest pressure    History is obtained from the patient, chart review, discussion with Dr. Givens in the emergency department.    History of Present Illness   Helene Gibbs is a 83 year old female who presents with epigastric/chest discomfort.      Patient's time course of epigastric and chest discomfort appears to be within the past 1-2 days, the patient is not really able to delineate this.  Believes that all of her medical issues are somewhat interconnected.  Has a history of atrial fibrillation status post pacemaker for slow ventricular rate, though primary complaint is her peripheral neuropathy, likely related diabetes.  Patient states that she has horrible peripheral neuropathy causing her to get up at night secondary to discomfort in her feet.  States that she wakes up, walks around her kitchen, and tends to fall asleep in her recliner for several hours per night.  This has led to increase in her lower extremity edema.  Patient states that over the past 2 weeks she has had increased lower extremity swelling.  Has followed with vascular and had ABIs of her lower extremities as of yesterday.  When describing her epigastric/chest discomfort, she describes a pressure.  3/10 in intensity.  States that she has been unable to wear a bra as it feels like a bandlike tightness.  Has never had a similar sensation in the past.  Does have a history of GERD for which she takes ranitidine and magnesium.  Patient received nitroglycerin, and chest pain completely resolved.  EKG in the emergency department for uninterpretable given V paced rhythm.  Patient states that  "despite being ventricular paced, she is still aware of her atrial fibrillation.  States that \"it (her atria) has really been going\" throughout the day today.  On EKG, it actually appears that patient is in atrial flutter currently.    Patient somewhat anxious.  Describes getting ready to move into an assisted living from her current independent apartment for ease of access to nursing staff as well as increased social interactions.     Patient continues to smoke 1 pack per day.  Has thought of quitting, believes she may need to quit as she will not be able to smoke at assisted living.    History of L Lumpectomy and radiation, increased risk for CAD 2/2 this as well.    2012 stress test with normal perfusion at rest and post stress.  Normal ejection fraction.    Past Medical History    I have reviewed this patient's medical history and updated it with pertinent information if needed.   Past Medical History:   Diagnosis Date     Anemia      Ankle arthritis      Arthritis      Back pain      Bell's palsy 9/08    left     Breast CA (H) 2004-    left lumpectomy, radiation, -recurrence     Colon polyps     colonoscopy-9/12-next due in 9/13     Congestive heart failure (H)      COPD (chronic obstructive pulmonary disease) (H)      Coronary artery disease      DM (diabetes mellitus) (H)      GERD (gastroesophageal reflux disease)      Hyperlipidemia      Hypertension      Lumbar spinal stenosis     use cane     Obesity      Osteoporosis      Other chronic pain      Pacemaker      Permanent atrial fibrillation (H) 8/2008    S/P AV node ablation and pacemaker 10-25-12       Pleural effusion      Pulmonary hypertension      Rectal stenosis      Tobacco abuse     current     Tricuspid regurgitation         Past Surgical History   I have reviewed this patient's surgical history and updated it with pertinent information if needed.  Past Surgical History:   Procedure Laterality Date     ARTHROSCOPY SHOULDER RT/LT  1999, 2004    " Bilateral, right then left     BONE MARROW BIOPSY, BONE SPECIMEN, NEEDLE/TROCAR N/A 8/29/2017    Procedure: BIOPSY BONE MARROW;  BONE MARROW BIOPSY;  Surgeon: Marino Cohen MD;  Location:  GI     C DEXA, BONE DENSITY, AXIAL SKEL       C MAMMOGRAM, SCREENING  1/2009     COLONOSCOPY N/A 10/7/2016    Procedure: COMBINED COLONOSCOPY, SINGLE OR MULTIPLE BIOPSY/POLYPECTOMY BY BIOPSY;  Surgeon: Cassandra Mccord MD;  Location:  GI     ESOPHAGOSCOPY, GASTROSCOPY, DUODENOSCOPY (EGD), COMBINED N/A 8/10/2017    Procedure: COMBINED ESOPHAGOSCOPY, GASTROSCOPY, DUODENOSCOPY (EGD), BIOPSY SINGLE OR MULTIPLE;  gastroscopy;  Surgeon: Helene Bustamante MD;  Location:  GI     H ABLATION AV NODE  10/2012     HC COLONOSCOPY THRU STOMA, DIAGNOSTIC  ? 2005     IMPLANT PACEMAKER  10/2012    [place] [id number], [id number]     JOINT REPLACEMTN, KNEE RT/LT      Joint Replacement knee bilateral     LAPAROSCOPIC CHOLECYSTECTOMY WITH CHOLANGIOGRAMS N/A 12/14/2015    Procedure: LAPAROSCOPIC CHOLECYSTECTOMY WITH CHOLANGIOGRAMS;  Surgeon: Ervin Amos MD;  Location:  OR     LUMPECTOMY BREAST      Left-2004     PHACOEMULSIFICATION CLEAR CORNEA WITH STANDARD INTRAOCULAR LENS IMPLANT Left 7/16/2015    Procedure: PHACOEMULSIFICATION CLEAR CORNEA WITH STANDARD INTRAOCULAR LENS IMPLANT;  Surgeon: Sai Obregon MD;  Location:  EC     PHACOEMULSIFICATION CLEAR CORNEA WITH TORIC INTRAOCULAR LENS IMPLANT Right 5/9/2017    Procedure: PHACOEMULSIFICATION CLEAR CORNEA WITH TORIC INTRAOCULAR LENS IMPLANT;  RIGHT EYE PHACOEMULSIFICATION CLEAR CORNEA WITH TORIC INTRAOCULAR LENS IMPLANT ;  Surgeon: Duc Richmond MD;  Location:  EC       Prior to Admission Medications   Prior to Admission Medications   Prescriptions Last Dose Informant Patient Reported? Taking?   ACCU-CHEK SMARTVIEW test strip   No No   Sig: USE 1 STRIP BY IN VITRO ROUTE 2 TIMES DAILY OR AS DIRECTED   BETA BLOCKER NOT PRESCRIBED, INTENTIONAL,   Yes No    Sig: Beta Blocker not prescribed intentionally due to Other: due to ablation procedure   GABAPENTIN PO   Yes No   Sig: Take 300 mg by mouth daily    GlipiZIDE (GLUCOTROL PO)   Yes No   Sig: Take 5 mg by mouth every morning (before breakfast)   Multiple Vitamins-Minerals (PRESERVISION/LUTEIN) CAPS   Yes No   Sig: Take 1 capsule by mouth daily    albuterol (PROAIR HFA, PROVENTIL HFA, VENTOLIN HFA) 108 (90 BASE) MCG/ACT inhaler   No No   Sig: Inhale 2 puffs into the lungs every 4 hours as needed for shortness of breath / dyspnea or wheezing   apixaban ANTICOAGULANT (ELIQUIS) 2.5 MG tablet   No No   Sig: Take 1 tablet (2.5 mg) by mouth 2 times daily   betamethasone valerate (VALISONE) 0.1 % cream   Yes No   Sig: Apply topically 2 times daily as needed (Use Sparingly)   fluticasone (FLONASE) 50 MCG/ACT spray   Yes No   Sig: Spray 1-2 sprays into both nostrils every evening   furosemide (LASIX) 20 MG tablet   No No   Sig: TAKE 1-2 TABS BY MOUTH ONCE DAILY. MAY REPEAT AFTER NOON IF NEEDED FOR WEIGHT GAIN/2+ EDEMA   hydrOXYzine (VISTARIL) 25 MG capsule   No No   Sig: Take 1 capsule (25 mg) by mouth 2 times daily as needed for itching   hydrocortisone (ANUSOL-HC) 2.5 % cream   No No   Sig: Place rectally 2 times daily   lisinopril (PRINIVIL/ZESTRIL) 10 MG tablet   No No   Sig: Take # 2 tablets every morning and one tablet every evening   magnesium hydroxide (MILK OF MAGNESIA) 400 MG/5ML suspension   Yes No   Sig: Take 30 mLs by mouth At Bedtime    nystatin (MYCOSTATIN) cream   Yes No   Sig: Apply topically 2 times daily as needed for dry skin   ondansetron (ZOFRAN ODT) 4 MG ODT tab   No No   Sig: Take 1 tablet (4 mg) by mouth every 6 hours as needed for nausea   oxyCODONE-acetaminophen (PERCOCET) 5-325 MG per tablet   No No   Sig: Take 1 tablet by mouth 3 times daily as needed for severe pain maximum 6 tablet(s) per day   ranitidine (ZANTAC) 150 MG tablet   No No   Sig: TAKE ONE TABLET BY MOUTH TWO TIMES A DAY    simvastatin (ZOCOR) 10 MG tablet   No No   Sig: TAKE 1 TABLET (10 MG) BY MOUTH AT BEDTIME   tiotropium (SPIRIVA) 18 MCG capsule   Yes No   Sig: Inhale 18 mcg into the lungs every evening      Facility-Administered Medications: None     Allergies   Allergies   Allergen Reactions     Ambien [Zolpidem Tartrate] Visual Disturbance     Hallucinations and fell out of bed at night.     Penicillins Hives     Definity      Caused a syncopal episode.     Sulfa Drugs Itching     Cymbalta Rash     Fluconazole Rash       Social History   I have reviewed this patient's social history and updated it with pertinent information if needed. Helene Gibbs  reports that she has been smoking Cigarettes.  She has a 11.25 pack-year smoking history. She has never used smokeless tobacco. She reports that she does not drink alcohol or use illicit drugs.     Family History   I have reviewed this patient's family history and updated it with pertinent information if needed.   Family History   Problem Relation Age of Onset     Hypertension Mother      DIABETES Mother       at 83 yrs     C.A.D. Father       age 70s     Breast Cancer No family hx of      Colon Cancer No family hx of        Review of Systems   The 10 point Review of Systems is negative other than noted in the HPI or here.   No fevers or chills  No nausea or emesis  No significant shortness of breath (no change from baseline)    Physical Exam   Temp: 98.1  F (36.7  C) Temp src: Oral BP: 147/70 Pulse: 70 Heart Rate: 70 Resp: 13 SpO2: 98 % O2 Device: None (Room air)    Vital Signs with Ranges  Temp:  [98.1  F (36.7  C)] 98.1  F (36.7  C)  Pulse:  [70] 70  Heart Rate:  [70-90] 70  Resp:  [9-21] 13  BP: (122-172)/(67-99) 147/70  SpO2:  [97 %-98 %] 98 %  165 lbs 0 oz    Constitutional: no acute distress, alert, conversant  Eyes: no scleral icterus or injection  HEENT: moist mucous membranes.  Respiratory: breath sounds with inspiratory crackles most notable in left base as  "compared to right.  No wheezes, only fair air movement throughout lung field despite good effort.  Cardiovascular: regular rate and rhythm, 2/6 soft systolic murmur best appreciated at cardiac apex.  Lymph/Hematologic: 2+ pitting in bilateral lower extremity's, slightly more notable on right.  Genitourinary: not examined  Skin: Venous stasis with associated erythema on bilateral lower extremities, varicose veins and erythema are more notable on right as compared to left.  Musculoskeletal: muscular tone intact in all extremities  Neurologic: mental status grossly intact, no focal deficits, alert.  Gait is slow and deliberate, though steady  Psychiatric: normal affect    Data   Data reviewed today:  I personally reviewed the EKG tracing showing Ventricular paced rhythm with atrial flutter.    Recent Labs  Lab 05/05/18  2040   WBC 5.8   HGB 11.6*   MCV 91         POTASSIUM 3.7   CHLORIDE 106   CO2 23   BUN 13   CR 1.27*   ANIONGAP 9   GERARD 9.4   *   ALBUMIN 3.9   PROTTOTAL 8.2   BILITOTAL 0.7   ALKPHOS 70   ALT 15   AST 17   LIPASE 67*   TROPI 0.038       Recent Results (from the past 24 hour(s))   XR Chest 2 Views    Narrative    XR CHEST 2 VW   5/5/2018 9:43 PM     HISTORY: upper abdominal pain; \"heart heavy\";     COMPARISON: Film dated 8/9/2017    FINDINGS: Cardiac silhouette is enlarged. A left cardiac device is in  place.. Slight blunting of the costophrenic angles is again noted and  is unchanged.  The lungs are clear. The pulmonary vasculature is  normal.  Patient is status post vertebral plasties at T11 and T12..      Impression    IMPRESSION: Cardiomegaly.. No significant change.        JOHNATHAN LOWRY MD     "

## 2018-05-06 NOTE — ED NOTES
Pt was already on ED bed and was brought to floor via ERT x1 and RN x1. Pt's belongings were also transported with the pt to the floor.

## 2018-05-07 ENCOUNTER — APPOINTMENT (OUTPATIENT)
Dept: NUCLEAR MEDICINE | Facility: CLINIC | Age: 83
DRG: 280 | End: 2018-05-07
Attending: NURSE PRACTITIONER
Payer: MEDICARE

## 2018-05-07 ENCOUNTER — APPOINTMENT (OUTPATIENT)
Dept: CARDIOLOGY | Facility: CLINIC | Age: 83
DRG: 280 | End: 2018-05-07
Attending: NURSE PRACTITIONER
Payer: MEDICARE

## 2018-05-07 DIAGNOSIS — I73.9 PAD (PERIPHERAL ARTERY DISEASE) (H): ICD-10-CM

## 2018-05-07 DIAGNOSIS — I70.229 CRITICAL ISCHEMIA OF LOWER EXTREMITY (H): Primary | ICD-10-CM

## 2018-05-07 LAB
ANION GAP SERPL CALCULATED.3IONS-SCNC: 8 MMOL/L (ref 3–14)
APTT PPP: 51 SEC (ref 22–37)
BUN SERPL-MCNC: 17 MG/DL (ref 7–30)
CALCIUM SERPL-MCNC: 8.6 MG/DL (ref 8.5–10.1)
CHLORIDE SERPL-SCNC: 106 MMOL/L (ref 94–109)
CO2 SERPL-SCNC: 24 MMOL/L (ref 20–32)
CREAT SERPL-MCNC: 1.33 MG/DL (ref 0.52–1.04)
ERYTHROCYTE [DISTWIDTH] IN BLOOD BY AUTOMATED COUNT: 22.5 % (ref 10–15)
GFR SERPL CREATININE-BSD FRML MDRD: 38 ML/MIN/1.7M2
GLUCOSE BLDC GLUCOMTR-MCNC: 130 MG/DL (ref 70–99)
GLUCOSE BLDC GLUCOMTR-MCNC: 130 MG/DL (ref 70–99)
GLUCOSE BLDC GLUCOMTR-MCNC: 131 MG/DL (ref 70–99)
GLUCOSE BLDC GLUCOMTR-MCNC: 137 MG/DL (ref 70–99)
GLUCOSE BLDC GLUCOMTR-MCNC: 145 MG/DL (ref 70–99)
GLUCOSE SERPL-MCNC: 118 MG/DL (ref 70–99)
HCT VFR BLD AUTO: 35.8 % (ref 35–47)
HGB BLD-MCNC: 11.3 G/DL (ref 11.7–15.7)
INTERPRETATION ECG - MUSE: NORMAL
LMWH PPP CHRO-ACNC: 0.27 IU/ML
MCH RBC QN AUTO: 28.4 PG (ref 26.5–33)
MCHC RBC AUTO-ENTMCNC: 31.6 G/DL (ref 31.5–36.5)
MCV RBC AUTO: 90 FL (ref 78–100)
PLATELET # BLD AUTO: 189 10E9/L (ref 150–450)
POTASSIUM SERPL-SCNC: 3.9 MMOL/L (ref 3.4–5.3)
RBC # BLD AUTO: 3.98 10E12/L (ref 3.8–5.2)
SODIUM SERPL-SCNC: 138 MMOL/L (ref 133–144)
TROPONIN I SERPL-MCNC: <0.015 UG/L (ref 0–0.04)
WBC # BLD AUTO: 5.5 10E9/L (ref 4–11)

## 2018-05-07 PROCEDURE — 85520 HEPARIN ASSAY: CPT | Performed by: INTERNAL MEDICINE

## 2018-05-07 PROCEDURE — 80048 BASIC METABOLIC PNL TOTAL CA: CPT | Performed by: INTERNAL MEDICINE

## 2018-05-07 PROCEDURE — 78452 HT MUSCLE IMAGE SPECT MULT: CPT

## 2018-05-07 PROCEDURE — 00000146 ZZHCL STATISTIC GLUCOSE BY METER IP

## 2018-05-07 PROCEDURE — 40000855 ZZH STATISTIC ECHO STRESS OR NM NPI

## 2018-05-07 PROCEDURE — 25000132 ZZH RX MED GY IP 250 OP 250 PS 637: Mod: GY | Performed by: INTERNAL MEDICINE

## 2018-05-07 PROCEDURE — 93016 CV STRESS TEST SUPVJ ONLY: CPT | Performed by: INTERNAL MEDICINE

## 2018-05-07 PROCEDURE — 99233 SBSQ HOSP IP/OBS HIGH 50: CPT | Performed by: PHYSICIAN ASSISTANT

## 2018-05-07 PROCEDURE — 25000128 H RX IP 250 OP 636: Performed by: INTERNAL MEDICINE

## 2018-05-07 PROCEDURE — 85730 THROMBOPLASTIN TIME PARTIAL: CPT | Performed by: INTERNAL MEDICINE

## 2018-05-07 PROCEDURE — A9502 TC99M TETROFOSMIN: HCPCS | Performed by: INTERNAL MEDICINE

## 2018-05-07 PROCEDURE — 84484 ASSAY OF TROPONIN QUANT: CPT | Performed by: INTERNAL MEDICINE

## 2018-05-07 PROCEDURE — 93018 CV STRESS TEST I&R ONLY: CPT | Performed by: INTERNAL MEDICINE

## 2018-05-07 PROCEDURE — 85027 COMPLETE CBC AUTOMATED: CPT | Performed by: INTERNAL MEDICINE

## 2018-05-07 PROCEDURE — A9270 NON-COVERED ITEM OR SERVICE: HCPCS | Mod: GY | Performed by: INTERNAL MEDICINE

## 2018-05-07 PROCEDURE — 34300033 ZZH RX 343: Performed by: INTERNAL MEDICINE

## 2018-05-07 PROCEDURE — 21000001 ZZH R&B HEART CARE

## 2018-05-07 PROCEDURE — 36415 COLL VENOUS BLD VENIPUNCTURE: CPT | Performed by: INTERNAL MEDICINE

## 2018-05-07 PROCEDURE — A9270 NON-COVERED ITEM OR SERVICE: HCPCS | Mod: GY | Performed by: PHYSICIAN ASSISTANT

## 2018-05-07 PROCEDURE — 99233 SBSQ HOSP IP/OBS HIGH 50: CPT | Performed by: INTERNAL MEDICINE

## 2018-05-07 PROCEDURE — 78452 HT MUSCLE IMAGE SPECT MULT: CPT | Mod: 26 | Performed by: INTERNAL MEDICINE

## 2018-05-07 PROCEDURE — 93017 CV STRESS TEST TRACING ONLY: CPT

## 2018-05-07 PROCEDURE — 25000132 ZZH RX MED GY IP 250 OP 250 PS 637: Mod: GY | Performed by: PHYSICIAN ASSISTANT

## 2018-05-07 RX ORDER — ALBUTEROL SULFATE 90 UG/1
2 AEROSOL, METERED RESPIRATORY (INHALATION) EVERY 5 MIN PRN
Status: DISCONTINUED | OUTPATIENT
Start: 2018-05-07 | End: 2018-05-08 | Stop reason: HOSPADM

## 2018-05-07 RX ORDER — ALBUTEROL SULFATE 90 UG/1
2 AEROSOL, METERED RESPIRATORY (INHALATION) EVERY 5 MIN PRN
Status: CANCELLED | OUTPATIENT
Start: 2018-05-07

## 2018-05-07 RX ORDER — AMINOPHYLLINE 25 MG/ML
50-100 INJECTION, SOLUTION INTRAVENOUS
Status: DISCONTINUED | OUTPATIENT
Start: 2018-05-07 | End: 2018-05-08 | Stop reason: HOSPADM

## 2018-05-07 RX ORDER — REGADENOSON 0.08 MG/ML
0.4 INJECTION, SOLUTION INTRAVENOUS ONCE
Status: COMPLETED | OUTPATIENT
Start: 2018-05-07 | End: 2018-05-07

## 2018-05-07 RX ORDER — ACYCLOVIR 200 MG/1
0-1 CAPSULE ORAL
Status: DISCONTINUED | OUTPATIENT
Start: 2018-05-07 | End: 2018-05-08 | Stop reason: HOSPADM

## 2018-05-07 RX ORDER — AMINOPHYLLINE 25 MG/ML
50-100 INJECTION, SOLUTION INTRAVENOUS
Status: DISCONTINUED | OUTPATIENT
Start: 2018-05-07 | End: 2018-05-07

## 2018-05-07 RX ORDER — AMINOPHYLLINE 25 MG/ML
50-100 INJECTION, SOLUTION INTRAVENOUS
Status: CANCELLED | OUTPATIENT
Start: 2018-05-07

## 2018-05-07 RX ORDER — ALBUTEROL SULFATE 90 UG/1
2 AEROSOL, METERED RESPIRATORY (INHALATION) EVERY 5 MIN PRN
Status: DISCONTINUED | OUTPATIENT
Start: 2018-05-07 | End: 2018-05-07

## 2018-05-07 RX ORDER — REGADENOSON 0.08 MG/ML
0.4 INJECTION, SOLUTION INTRAVENOUS ONCE
Status: DISCONTINUED | OUTPATIENT
Start: 2018-05-07 | End: 2018-05-07

## 2018-05-07 RX ORDER — ACYCLOVIR 200 MG/1
0-1 CAPSULE ORAL
Status: CANCELLED | OUTPATIENT
Start: 2018-05-07

## 2018-05-07 RX ORDER — REGADENOSON 0.08 MG/ML
0.4 INJECTION, SOLUTION INTRAVENOUS ONCE
Status: CANCELLED | OUTPATIENT
Start: 2018-05-07 | End: 2018-05-07

## 2018-05-07 RX ORDER — REGADENOSON 0.08 MG/ML
0.4 INJECTION, SOLUTION INTRAVENOUS ONCE
Status: DISCONTINUED | OUTPATIENT
Start: 2018-05-07 | End: 2018-05-08 | Stop reason: HOSPADM

## 2018-05-07 RX ADMIN — FLUTICASONE PROPIONATE 1 SPRAY: 50 SPRAY, METERED NASAL at 21:15

## 2018-05-07 RX ADMIN — SIMVASTATIN 10 MG: 10 TABLET, FILM COATED ORAL at 21:16

## 2018-05-07 RX ADMIN — ACETAMINOPHEN 650 MG: 325 TABLET ORAL at 12:35

## 2018-05-07 RX ADMIN — Medication 1 MG: at 21:20

## 2018-05-07 RX ADMIN — NICOTINE 1 PATCH: 21 PATCH, EXTENDED RELEASE TRANSDERMAL at 09:13

## 2018-05-07 RX ADMIN — LISINOPRIL 10 MG: 10 TABLET ORAL at 09:14

## 2018-05-07 RX ADMIN — MAGNESIUM HYDROXIDE 30 ML: 400 SUSPENSION ORAL at 00:17

## 2018-05-07 RX ADMIN — Medication 1 MG: at 00:40

## 2018-05-07 RX ADMIN — HEPARIN SODIUM 950 UNITS/HR: 10000 INJECTION, SOLUTION INTRAVENOUS at 03:44

## 2018-05-07 RX ADMIN — REGADENOSON 0.4 MG: 0.08 INJECTION, SOLUTION INTRAVENOUS at 14:23

## 2018-05-07 RX ADMIN — ASPIRIN 81 MG: 81 TABLET, COATED ORAL at 09:14

## 2018-05-07 RX ADMIN — METOPROLOL TARTRATE 25 MG: 25 TABLET ORAL at 09:14

## 2018-05-07 RX ADMIN — LISINOPRIL 10 MG: 10 TABLET ORAL at 21:15

## 2018-05-07 RX ADMIN — RANITIDINE 150 MG: 150 TABLET ORAL at 21:15

## 2018-05-07 RX ADMIN — FUROSEMIDE 40 MG: 40 TABLET ORAL at 09:14

## 2018-05-07 RX ADMIN — GABAPENTIN 300 MG: 300 CAPSULE ORAL at 09:14

## 2018-05-07 RX ADMIN — TETROFOSMIN 11 MCI.: 1.38 INJECTION, POWDER, LYOPHILIZED, FOR SOLUTION INTRAVENOUS at 12:10

## 2018-05-07 RX ADMIN — TETROFOSMIN 35.9 MCI.: 1.38 INJECTION, POWDER, LYOPHILIZED, FOR SOLUTION INTRAVENOUS at 14:25

## 2018-05-07 RX ADMIN — METOPROLOL TARTRATE 25 MG: 25 TABLET ORAL at 21:15

## 2018-05-07 ASSESSMENT — PAIN DESCRIPTION - DESCRIPTORS: DESCRIPTORS: ACHING

## 2018-05-07 NOTE — PROGRESS NOTES
[facility]  Cardiology Progress Note  Date of Service: 05/07/2018  Primary Cardiologist: Dr. Juarez    Assessment & Plan    Helene Gibbs is a 83 year old female with past medical history significant for permanent atrial fibrillation/flutter, status post PPM and AV node ablation, PAD s/p stenting, T2 DM, iron deficiency anemia, and HFpEF admitted on 5/5/2018 with with worsening dyspnea on exertion and chest pain.     Assessment:  1. HFpEF:[PTA Lasix 20 mg daily, Lisinopril 20 mg in am and 10 mg pm] Acute on chronic exacerbation. EF in March was 50-55% with moderate TR and mild pulmonary htn. BNP 6738    Initially diuresed with IV Lasix    Now on Lasix PO 40 mg BID    Net negative 4.8L and down ? 4-5 lbs from admission    2. NSTEMI: No previous history of CAD. Troponin 0.038-0.079-0.060. IV Heparin    3. Permanent atrial fibrillation/flutter: [PTA: Eliquis 2.5 mg BID] Status post AV node ablation and PPM. 100% paced    Metoprolol 25 mg BID    4. Hematochezia: patient reports blood in stool. Hgb stable. 11.6-11.7-11.3. History of hemorrhoids    5. CKD, Stage 3b: creatine 1.27-1.33    6. Nicotine dependency: Nicotine patches in place    7. Hyperlipidemia:[PTA Simvastatin 10 mg daily]    8. PAD/Critical limb ischemia: Status post left SFA anterior tibial angioplasty 1/2018. Follow with Dr. Pratt in Vascular Surgery    9. Lymphedema: Follows with lymphedema clinic    Plan:   1. Lexiscan nuclear study today.    Ирина Wooten, APRN, CNP  P Heart  Pager: [phone]     Interval History   Diuresing well. No further chest pain.    Physical Exam   Temp: 98  F (36.7  C) Temp src: Oral BP: 137/71   Heart Rate: 71 Resp: 18 SpO2: 98 % O2 Device: None (Room air)    Vitals:    05/06/18 0047 05/06/18 0345 05/07/18 0000   Weight: 77.4 kg (170 lb 9.6 oz) 75.7 kg (166 lb 12.8 oz) 73.4 kg (161 lb 12.8 oz)       Constitutional:   NAD   Skin:   Warm and dry   Head:   Nontraumatic   Neck:   no JVD   Lungs:   Crackles  bilaterally   Cardiovascular:   irregularly irregular rhythm   Abdomen:   Benign   Extremities and Back:   Edema 1+   Neurological:   Grossly nonfocal       Medications     HEParin 950 Units/hr (05/07/18 0919)       aspirin EC  81 mg Oral Daily     fluticasone  1-2 spray Both Nostrils QPM     furosemide  40 mg Oral Daily     gabapentin (NEURONTIN) capsule 300 mg  300 mg Oral Daily     insulin aspart  1-6 Units Subcutaneous Q4H     lisinopril  10 mg Oral BID     metoprolol tartrate  25 mg Oral BID     nicotine  1 patch Transdermal Daily     nicotine   Transdermal Q8H     nicotine   Transdermal Daily     ranitidine  150 mg Oral At Bedtime     simvastatin  10 mg Oral At Bedtime     sodium chloride (PF)  10 mL Intravenous Once     sodium chloride (PF)  3 mL Intracatheter Q8H       Data     Most Recent 3 BMP's:  Recent Labs   Lab Test  05/07/18   0608  05/05/18   2040  04/06/18   0728   NA  138  138  137   POTASSIUM  3.9  3.7  4.5   CHLORIDE  106  106  105   CO2  24  23  26   BUN  17  13  22   CR  1.33*  1.27*  1.29*   ANIONGAP  8  9  6   GERARD  8.6  9.4  8.7   GLC  118*  118*  133*     Most Recent 3 Hemoglobins:  Recent Labs   Lab Test  05/07/18   0608  05/06/18   1400  05/05/18   2040   HGB  11.3*  11.7  11.6*     Most Recent 3 Troponin's:  Recent Labs   Lab Test  05/07/18   0608  05/06/18   1400  05/06/18   0615   TROPI  <0.015  0.031  0.060*

## 2018-05-07 NOTE — PLAN OF CARE
Problem: Patient Care Overview  Goal: Plan of Care/Patient Progress Review  Outcome: No Change  A&O. Elevated 's, all other VSS on room air. Tele: 100% V paced, HR 70's. Denies CP and SOB overnight. Trops trending down. Up w/ 1 to bathroom. C/o weakness when up in bathroom. Wt down 5 lbs this AM. Heparin @ 950 units/hr. MOM given for bowel regularity, pt c/o abdominal cramping. Medium, loose BM this AM w/ no noted blood. Hgb 11.3 this AM. Plan for TTE today, if abnormal possible angio. Pt has been NPO since midnight.

## 2018-05-07 NOTE — PROGRESS NOTES
Patient for lexiscan stress test. Denies caffeine past 24 hours. Denies pain pre-procedure. Following injection, patient stated she had some abdominal pain and upset stomach. Instructed to move feet and legs. Denies chest pain or shortness of breath during duration. Stable throughout. Returned to room 246-1 in medically stable condition per wheelchair.

## 2018-05-07 NOTE — NURSING NOTE
"Helene Gibbs is a 83 year old female who presents for:  Chief Complaint   Patient presents with     RECHECK     EMIGDIO (11:00 [facility]; 11:30 [facility]) History of angioplasty of left anterior tibial artery; 3 month follow up to 2/2/18 appointment with Dr. Watson         Initial Vitals:  /78 (BP Location: Right arm, Patient Position: Chair, Cuff Size: Adult Large)  Pulse 70  LMP  (LMP Unknown)  Breastfeeding? No Estimated body mass index is 26.12 kg/(m^2) as calculated from the following:    Height as of 5/6/18: 5' 6\" (1.676 m).    Weight as of 5/7/18: 161 lb 12.8 oz (73.4 kg).         [facility]des[facility]       "

## 2018-05-07 NOTE — PLAN OF CARE
Problem: Patient Care Overview  Goal: Plan of Care/Patient Progress Review  Outcome: No Change  : VSS except 's, gave scheduled lisinopril+metoprolol, tele 100% V paced. Denies SOB, chest discomfort or any pain. Ambulate to bathroom with SBA+walker, voids adequate UOP. Had BM last shift. NPO after midnight, plans for TTE & possible angio consideration tomorrow per MD's note.

## 2018-05-07 NOTE — PROGRESS NOTES
[facility]    Hospitalist Progress Note    Date of Service (when I saw the patient): 05/07/2018    Assessment & Plan      Helene Gibbs is a 83 year old female with PMHx of atrial fibrillation s/p ablation and pacemaker placement, T2DM with associated neuropathy, COPD, CKD III, nicotine dependent, lymphedema, iron deficiency anemia, and HFpEF who presented to the ED on 5/5/18 with complaints of chest pain, found to have NSTEMI. Admitted under inpatient status for further evaluation and treatment. Vitals currently WNL. Pt currently stable.     NSTEMI: Patient with atypical epigastric/chest discomfort.  Onset of chest discomfort at rest, no exertional component noted by patient, improved/completely resolved following oral nitroglycerin.  No prior history of coronary artery disease. Troponin 0.038>0.079>0.060>0.031>0.015. Known atrial fibrillation/flutter with V paced rhythm. CMP, CBC, and lipase WNL. BNP 6738. CXR with normal vasculature. Lexiscan showing small area of apical ischemia which appears new when compared to prior study from 2012.  Last echo as of 3/1/18 with mild pulmonary hypertension, ejection fraction of 50-55%, normal IVC, moderate tricuspid regurgitation with severe biatrial enlargement.  V paced.  Very limited utility to repeating TTE. Risk factors for CAD include age, HTN, tobacco hx, DM.   -- Continue telemetry   -- Cardiology following, appreciate assistance greatly; continue heparin gtt   -- Continue ASA 81 mg, simvastatin 10 mg po qd, lisinopril 10 mg po BID  -- Metoprolol 25 mg po BID added 5/6, continue   -- Smoking cessation encouraged   -- NPO at midnight in the event of any intervention in the AM     HFpEF, acute on chronic exacerbation: EF in March was 50-55% with moderate TR and mild pulmonary htn. BNP 6738.  Initially diuresed with IV Lasix 40 mg X2 since admission, transitioned to Lasix 40 mg po qd on 5/7. Down 5.37 L on admission.   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 PTA maintained on glipizide  -- Medium dose sliding scale insulin available as needed  -- Holding prior to admission glipizide twice daily  -- Gabapentin 300 mg daily for neuropathy  -- Continue prior to admission lisinopril    Recent Labs  Lab 05/07/18  1558 05/07/18  1213 05/07/18  0831 05/07/18  0608 05/07/18  0156 05/06/18  2149 05/06/18  1834  05/05/18  2040   GLC  --   --   --  118*  --   --   --   --  118*   * 130* 131*  --  130* 185* 161*  < >  --    < > = values in this interval not displayed.    COPD:  -Duo nebs scheduled 4 times daily  -Continue prior to admission fluticasone     Chronic kidney disease, stage III: Creatinine of 1.27>1.33.  Appears baseline with creatinine typically in the 1.3 range.  History of type 2 diabetes with mild proteinuria.  -Continue prior to admission lisinopril     Lymphedema: Patient with chronic lymphedema, worsening.  Potentially related to pulmonary hypertension, as patient has a history of mild pulmonary hypertension, though anticipate a significant contribution from the fact that patient has neuropathy and will get up at night to ambulate before sitting in her recliner and falling asleep several hours per night.  -Patient counseled on lower externally elevation and avoiding sleeping in a recliner  -Continue outpatient lymphedema follow-up.  Patient is already  followed in lymphedema clinic.     Iron deficiency anemia: Improving.  Hemoglobin of 9.9 as of 4/17, currently 11.6.  As of 4/17 follow-up, iron saturation of 6%.    Recent Labs  Lab 05/07/18  0608 05/06/18  1400 05/05/18  2040   HGB 11.3* 11.7 11.6*      Nicotine dependence: Patient continues to smoke  -Nicotine patch ordered  -Discussed cessation    # Pain Assessment:  Current Pain Score 5/7/2018   Patient currently in pain? denies   Pain score (0-10) -   Pain location -   Pain descriptors -   Helene pickett pain level was assessed and she currently denies pain.      DVT Prophylaxis: Heparin gtt  Code Status: DNR/DNI    Disposition: Expected discharge pending Cardiology's input regarding Lexiscan results.     Dolores Hazel PA-C    This patient was discussed with Dr. Pickett of the Hospitalist Service who agrees with current plans as outlined above.     Interval History   No ongoing CP. SOB improved. Denies palpitations, dizziness, lightheadedness. Lexiscan today, Cardiology f/u tomorrow.     -Data reviewed today: See below    Physical Exam   Temp: 98  F (36.7  C) Temp src: Oral BP: 149/84   Heart Rate: 69 Resp: 16 SpO2: 99 % O2 Device: None (Room air)    Vitals:    05/06/18 0047 05/06/18 0345 05/07/18 0000   Weight: 77.4 kg (170 lb 9.6 oz) 75.7 kg (166 lb 12.8 oz) 73.4 kg (161 lb 12.8 oz)     Vital Signs with Ranges  Temp:  [97.8  F (36.6  C)-98  F (36.7  C)] 98  F (36.7  C)  Heart Rate:  [69-74] 69  Resp:  [16-20] 16  BP: (123-151)/(71-85) 149/84  SpO2:  [95 %-100 %] 99 %  I/O last 3 completed shifts:  In: 770 [P.O.:570; I.V.:200]  Out: 2700 [Urine:2700]    CONSTITUTIONAL: Pt sitting upright in chair, dressed in hospital garb. Appears comfortable. Cooperative with interview.  HEENT: Normocephalic, atraumatic. Negative for conjunctival redness or scleral icterus.    CARDIOVASCULAR: RRR, no murmurs, rubs, or extra heart sounds appreciated. Pulses +2/4 and regular in upper and lower extremities, bilaterally.    RESPIRATORY: No increased work of breathing.  CTA, bilat; no wheezes, rales, or rhonchi appreciated.  GASTROINTESTINAL:  Abdomen soft, non-distended. BS auscultated in all four quadrants. Negative for tenderness to palpation.  No masses or organomegaly noted.  MUSCULOSKELETAL: No gross deformities noted. Normal muscle tone.   NEUROLOGIC: Alert and oriented to person, place, and time.  strength intact.   SKIN: Lymphedema in lower extremities, no pitting edema appreciated.     Medications     HEParin 950 Units/hr (05/07/18 0919)       aspirin EC  81 mg Oral Daily     fluticasone  1-2 spray Both Nostrils QPM     furosemide  40 mg Oral Daily     gabapentin (NEURONTIN) capsule 300 mg  300 mg Oral Daily     insulin aspart  1-6 Units Subcutaneous Q4H     lisinopril  10 mg Oral BID     metoprolol tartrate  25 mg Oral BID     nicotine  1 patch Transdermal Daily     nicotine   Transdermal Q8H     nicotine   Transdermal Daily     ranitidine  150 mg Oral At Bedtime     regadenoson  0.4 mg Intravenous Once     simvastatin  10 mg Oral At Bedtime     sodium chloride (PF)  10 mL Intravenous Once     sodium chloride (PF)  10 mL Intravenous Once     sodium chloride (PF)  3 mL Intracatheter Q8H     Data     Recent Labs  Lab 05/07/18  0608 05/06/18  1400 05/06/18  0615  05/05/18  2040   WBC 5.5 5.3  --   --  5.8   HGB 11.3* 11.7  --   --  11.6*   MCV 90 90  --   --  91    209  --   --  214     --   --   --  138   POTASSIUM 3.9  --   --   --  3.7   CHLORIDE 106  --   --   --  106   CO2 24  --   --   --  23   BUN 17  --   --   --  13   CR 1.33*  --   --   --  1.27*   ANIONGAP 8  --   --   --  9   GERARD 8.6  --   --   --  9.4   *  --   --   --  118*   ALBUMIN  --   --   --   --  3.9   PROTTOTAL  --   --   --   --  8.2   BILITOTAL  --   --   --   --  0.7   ALKPHOS  --   --   --   --  70   ALT  --   --   --   --  15   AST  --   --   --   --  17   LIPASE  --   --   --   --  67*   TROPI <0.015 0.031 0.060*  < > 0.038    < > = values in this interval not displayed.    Recent Results (from the past 24 hour(s))   NM Lexiscan stress test (nuc card)    Narrative    GATED MYOCARDIAL PERFUSION SCINTIGRAPHY WITH INTRAVENOUS PHARMACOLOGIC  VASODILATATION LEXISCAN -ONE DAY STUDY     5/7/2018 3:46 PM  PATRICIA BOBO  83 years  Female  1935.    Indication/Clinical History:    Impression  1.  Myocardial perfusion imaging using single isotope technique  demonstrated small area of apical ischemia.   2. Gated images demonstrated normal wall motion with a calculated  ejection fraction of 61%.    3. Compared to the prior study from 2012 the apical ischemia appears  to be new .    Procedure  Pharmacologic stress testing was performed with Lexiscan at a rate of  0.08 mg/ml rapid bolus injection, for 15 seconds, 0.4 mg/5ml  intravenously. Low-level exercise was not performed along with the  vasodilator infusion.  The heart rate was 70 at baseline and shawn to  70 beats per minute during the Lexiscan infusion. The rest blood  pressure was 144/73 mmHg and was 125/71 mm Hg during Lexiscan  infusion. The patient experienced shortness of breath  during the  test.    Myocardial perfusion imaging was performed at rest, approximately 45  minutes after the injection intravenously of 11.0 mCi of Tc-99m  Myoview. At peak pharmacologic effect, 10-20 seconds after Lexiscan,   the patient was injected intravenously with 35.9 mCi of  Tc-99m  Myoview. The post-stress tomographic imaging was performed  approximately 60 minutes after stress.    EKG Findings  The resting EKG demonstrated ventricular pacing with underlying atrial  fibrillation. The stress EKG demonstrated ventricular pacing.    Tomographic Findings  The overall quality of the study is fair. On the rest tomographic  images there is a small area of moderate decreased tracer uptake in  the apex. This appears worse on the stress tomographic images and is  consistent with a small area of apical  ischemia of moderate intensity.  Gating revealed normal wall motion with a calculated ejection fraction  of 61%. The transient ischemic dilatation is calculated at 1.01.    LUIS MIGUEL GARCIA MD

## 2018-05-07 NOTE — PLAN OF CARE
Problem: Patient Care Overview  Goal: Plan of Care/Patient Progress Review  Outcome: No Change  VSS. Monitor remains V. Paced rhythm. Heparin drip continues at 950 u/hr. Lexiscan stress test in process per order. Continue to monitor and observe.

## 2018-05-08 VITALS
BODY MASS INDEX: 25.84 KG/M2 | SYSTOLIC BLOOD PRESSURE: 115 MMHG | WEIGHT: 160.8 LBS | HEART RATE: 70 BPM | DIASTOLIC BLOOD PRESSURE: 76 MMHG | TEMPERATURE: 96.9 F | OXYGEN SATURATION: 98 % | HEIGHT: 66 IN | RESPIRATION RATE: 16 BRPM

## 2018-05-08 LAB
ANION GAP SERPL CALCULATED.3IONS-SCNC: 7 MMOL/L (ref 3–14)
BASOPHILS # BLD AUTO: 0 10E9/L (ref 0–0.2)
BASOPHILS NFR BLD AUTO: 0.4 %
BUN SERPL-MCNC: 20 MG/DL (ref 7–30)
CALCIUM SERPL-MCNC: 8.7 MG/DL (ref 8.5–10.1)
CHLORIDE SERPL-SCNC: 108 MMOL/L (ref 94–109)
CO2 SERPL-SCNC: 25 MMOL/L (ref 20–32)
CREAT SERPL-MCNC: 1.18 MG/DL (ref 0.52–1.04)
DIFFERENTIAL METHOD BLD: ABNORMAL
EOSINOPHIL # BLD AUTO: 0.1 10E9/L (ref 0–0.7)
EOSINOPHIL NFR BLD AUTO: 2.3 %
ERYTHROCYTE [DISTWIDTH] IN BLOOD BY AUTOMATED COUNT: 22.6 % (ref 10–15)
GFR SERPL CREATININE-BSD FRML MDRD: 44 ML/MIN/1.7M2
GLUCOSE BLDC GLUCOMTR-MCNC: 114 MG/DL (ref 70–99)
GLUCOSE BLDC GLUCOMTR-MCNC: 128 MG/DL (ref 70–99)
GLUCOSE BLDC GLUCOMTR-MCNC: 139 MG/DL (ref 70–99)
GLUCOSE SERPL-MCNC: 118 MG/DL (ref 70–99)
HCT VFR BLD AUTO: 37.7 % (ref 35–47)
HGB BLD-MCNC: 11.6 G/DL (ref 11.7–15.7)
IMM GRANULOCYTES # BLD: 0 10E9/L (ref 0–0.4)
IMM GRANULOCYTES NFR BLD: 0.2 %
LMWH PPP CHRO-ACNC: 0.18 IU/ML
LYMPHOCYTES # BLD AUTO: 1 10E9/L (ref 0.8–5.3)
LYMPHOCYTES NFR BLD AUTO: 21.5 %
MCH RBC QN AUTO: 27.9 PG (ref 26.5–33)
MCHC RBC AUTO-ENTMCNC: 30.8 G/DL (ref 31.5–36.5)
MCV RBC AUTO: 91 FL (ref 78–100)
MONOCYTES # BLD AUTO: 0.5 10E9/L (ref 0–1.3)
MONOCYTES NFR BLD AUTO: 11.3 %
NEUTROPHILS # BLD AUTO: 3.1 10E9/L (ref 1.6–8.3)
NEUTROPHILS NFR BLD AUTO: 64.3 %
NRBC # BLD AUTO: 0 10*3/UL
NRBC BLD AUTO-RTO: 0 /100
PLATELET # BLD AUTO: 170 10E9/L (ref 150–450)
POTASSIUM SERPL-SCNC: 3.6 MMOL/L (ref 3.4–5.3)
RBC # BLD AUTO: 4.16 10E12/L (ref 3.8–5.2)
SODIUM SERPL-SCNC: 140 MMOL/L (ref 133–144)
WBC # BLD AUTO: 4.8 10E9/L (ref 4–11)

## 2018-05-08 PROCEDURE — A9270 NON-COVERED ITEM OR SERVICE: HCPCS | Mod: GY | Performed by: PHYSICIAN ASSISTANT

## 2018-05-08 PROCEDURE — 80048 BASIC METABOLIC PNL TOTAL CA: CPT | Performed by: INTERNAL MEDICINE

## 2018-05-08 PROCEDURE — 99239 HOSP IP/OBS DSCHRG MGMT >30: CPT | Performed by: PHYSICIAN ASSISTANT

## 2018-05-08 PROCEDURE — A9270 NON-COVERED ITEM OR SERVICE: HCPCS | Mod: GY | Performed by: INTERNAL MEDICINE

## 2018-05-08 PROCEDURE — 25000132 ZZH RX MED GY IP 250 OP 250 PS 637: Mod: GY | Performed by: PHYSICIAN ASSISTANT

## 2018-05-08 PROCEDURE — 99232 SBSQ HOSP IP/OBS MODERATE 35: CPT | Performed by: INTERNAL MEDICINE

## 2018-05-08 PROCEDURE — 25000132 ZZH RX MED GY IP 250 OP 250 PS 637: Mod: GY | Performed by: NURSE PRACTITIONER

## 2018-05-08 PROCEDURE — 00000146 ZZHCL STATISTIC GLUCOSE BY METER IP

## 2018-05-08 PROCEDURE — 85520 HEPARIN ASSAY: CPT | Performed by: INTERNAL MEDICINE

## 2018-05-08 PROCEDURE — 36415 COLL VENOUS BLD VENIPUNCTURE: CPT | Performed by: INTERNAL MEDICINE

## 2018-05-08 PROCEDURE — 25000132 ZZH RX MED GY IP 250 OP 250 PS 637: Mod: GY | Performed by: INTERNAL MEDICINE

## 2018-05-08 PROCEDURE — A9270 NON-COVERED ITEM OR SERVICE: HCPCS | Mod: GY | Performed by: NURSE PRACTITIONER

## 2018-05-08 PROCEDURE — 25000128 H RX IP 250 OP 636: Performed by: INTERNAL MEDICINE

## 2018-05-08 PROCEDURE — 85025 COMPLETE CBC W/AUTO DIFF WBC: CPT | Performed by: INTERNAL MEDICINE

## 2018-05-08 RX ORDER — LISINOPRIL 10 MG/1
10 TABLET ORAL 2 TIMES DAILY
Qty: 60 TABLET | Refills: 0
Start: 2018-05-08 | End: 2018-05-14

## 2018-05-08 RX ORDER — METOPROLOL TARTRATE 25 MG/1
25 TABLET, FILM COATED ORAL 2 TIMES DAILY
Qty: 60 TABLET | Refills: 0 | Status: SHIPPED | OUTPATIENT
Start: 2018-05-08 | End: 2018-05-14

## 2018-05-08 RX ORDER — FUROSEMIDE 40 MG
40 TABLET ORAL DAILY
Qty: 30 TABLET | Refills: 0 | Status: SHIPPED | OUTPATIENT
Start: 2018-05-09 | End: 2018-05-14

## 2018-05-08 RX ADMIN — FUROSEMIDE 40 MG: 40 TABLET ORAL at 08:18

## 2018-05-08 RX ADMIN — NICOTINE 1 PATCH: 21 PATCH, EXTENDED RELEASE TRANSDERMAL at 08:18

## 2018-05-08 RX ADMIN — APIXABAN 2.5 MG: 2.5 TABLET, FILM COATED ORAL at 10:18

## 2018-05-08 RX ADMIN — GABAPENTIN 300 MG: 300 CAPSULE ORAL at 08:18

## 2018-05-08 RX ADMIN — ASPIRIN 81 MG: 81 TABLET, COATED ORAL at 08:19

## 2018-05-08 RX ADMIN — METOPROLOL TARTRATE 25 MG: 25 TABLET ORAL at 08:18

## 2018-05-08 RX ADMIN — LISINOPRIL 10 MG: 10 TABLET ORAL at 08:19

## 2018-05-08 NOTE — PROGRESS NOTES
[facility]  Patient is currently open to home care services with [place]. The patient is currently receiving private pay nursing services. [facility]  and team have been notified of patient admission. [facility] liaison will continue to follow patient during stay. If appropriate for Skilled services please provide home care orders and Face to Face documentation at time of discharge.  RN Liaison will inform private pay RN Case manager that plan is for patient to discharge from [facility] today with orders for Skilled HC services.

## 2018-05-08 NOTE — PLAN OF CARE
Problem: Patient Care Overview  Goal: Plan of Care/Patient Progress Review  Outcome: Adequate for Discharge Date Met: 05/08/18  VSS. Monitor remains V.paced rhythm. Pt. Denies pain. Pt. Discharged to home with niece per order. Discharge instructions reviewed with pt.

## 2018-05-08 NOTE — PROGRESS NOTES
Met with patient to set up hospital follow up and home care. She wants to go with [facility]. Referral sent, PCP appointment made, handoff sent.

## 2018-05-08 NOTE — PLAN OF CARE
Problem: Cardiac: Heart Failure (Adult)  Goal: Signs and Symptoms of Listed Potential Problems Will be Absent, Minimized or Managed (Cardiac: Heart Failure)  Signs and symptoms of listed potential problems will be absent, minimized or managed by discharge/transition of care (reference Cardiac: Heart Failure (Adult) CPG).  Outcome: No Change  Heart Failure Care Pathway  GOALS TO BE MET BEFORE DISCHARGE:    1. Decrease congestion and/or edema with diuretic therapy to achieve near      optimal volume status.            Dyspnea improved:  Yes            Edema improved:     Yes        Net I/O and Weights since admission:          04/08 0700 - 05/08 0659  In: 1348 [P.O.:1110; I.V.:238]  Out: 6675 [Urine:6675]  Net: -5327            Vitals:    05/05/18 2031 05/06/18 0047 05/06/18 0345 05/07/18 0000   Weight: 74.8 kg (165 lb) 77.4 kg (170 lb 9.6 oz) 75.7 kg (166 lb 12.8 oz) 73.4 kg (161 lb 12.8 oz)    05/08/18 0100   Weight: 72.9 kg (160 lb 12.8 oz)       2.  O2 sats > 92% on RA or at prior home O2 therapy level.          Current oxygenation status:       SpO2: 94 %         O2 Device: None (Room air),            Able to wean O2 this shift to keep sats > 92%:  Yes       Does patient use Home O2? No    3.  Tolerates ambulation and mobility near baseline: Yes        How many times did the patient ambulate with nursing staff this shift? 1    Past medical hx of HF.  Pt alert, oriented and able to initiate needs.  Pt denies any pain or other issues.  Pt vpaced and vitals remain stable.  Lungs sound clear with fine crackles noted in bases.  Pt states that she is deep breathing, coughing independently.  Small amount of phlegm noted per pt not visualized by this nurse.  No further issues noted.  Pt is currently on heparin drip and will have lab check in am.  Will continue to monitor.    Please review the Heart Failure Care Pathway for additional HF goal outcomes.    JIHAN MAURER RN  5/8/2018

## 2018-05-08 NOTE — DISCHARGE SUMMARY
[facility]    Discharge Summary  Hospitalist    Date of Admission:  5/5/2018  Date of Discharge:  5/8/2018  Discharging Provider: Dolores Hazel PA-C  Date of Service (when I saw the patient): 05/08/18    Discharge Diagnoses   NSTEMI  HFpEF, acute exacerbation    History of Present Illness   Helene Gibbs is a 83 year old female with PMHx of atrial fibrillation s/p ablation and pacemaker placement, T2DM with associated neuropathy, COPD, CKD III, nicotine dependent, lymphedema, iron deficiency anemia, and HFpEF who presented to the ED on 5/5/18 with complaints of chest pain, found to have NSTEMI. Admitted under inpatient status for further evaluation and treatment. Vitals currently WNL. Pt currently stable. See H&P by Dr. Reyes from 5/5/18 for complete details on presentation.     This AM on my interview, the patient is sitting upright in bed. She's hungry. Denies chest pain, SOB, dizziness, lightheadedness. No acute events overnight. Anxious to discharge home.      Hospital Course   Helene Gibbs was admitted on 5/5/2018.  The following problems were addressed during her hospitalization:    NSTEMI: Patient with atypical epigastric/chest discomfort.  Onset of chest discomfort at rest, no exertional component noted by patient, improved/completely resolved following oral nitroglycerin.  No prior history of coronary artery disease. Troponin 0.038>0.079>0.060>0.031>0.015. Known atrial fibrillation/flutter with V paced rhythm. CMP, CBC, and lipase WNL. BNP 6738. CXR with normal vasculature. Lexiscan showing small area of apical ischemia which appears new when compared to prior study from 2012.  Last echo as of 3/1/18 with mild pulmonary hypertension, ejection fraction of 50-55%, normal IVC, moderate tricuspid regurgitation with severe biatrial enlargement.  V paced.  Very limited utility to repeating TTE. Risk factors for CAD include age, HTN, tobacco hx, DM.   -- Cardiology following,  appreciate assistance greatly; recommend medication management below and close Cardiology follow-up.   -- Continue ASA 81 mg po qd, simvastatin 10 mg po qd, lisinopril 10 mg po BID  -- Metoprolol 25 mg po BID added 5/6, continue; prescription set with patient at discharge  -- Smoking cessation encouraged   -- Home RN, HHA, PT and OT at discharge, social work helping to facilitate this     HFpEF, acute on chronic exacerbation: EF in March was 50-55% with moderate TR and mild pulmonary htn. BNP 6738.  Initially diuresed with IV Lasix 40 mg X2 since admission, transitioned to Lasix 40 mg po qd on 5/7. Down 5.37 L on admission.   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 PTA maintained on glipizide  -- Continue Glipizide at discharge   -- Continue prior to admission lisinopril    Recent Labs  Lab 05/08/18  0851 05/08/18  0630 05/08/18  0507 05/08/18  0101 05/07/18  2112 05/07/18  1558 05/07/18  1213  05/07/18  0608  05/05/18  2040   GLC  --  118*  --   --   --   --   --   --  118*  --  118*   *  --  114* 139* 137* 145* 130*  < >  --   < >  --    < > = values in this interval not displayed.    COPD:  -Duo nebs scheduled 4 times daily  -Continue prior to admission fluticasone      Chronic kidney disease, stage III: Creatinine of 1.27>1.33.  Appears baseline with creatinine typically in the 1.3 range.   History of type 2 diabetes with mild proteinuria.  -Continue prior to admission lisinopril      Lymphedema: Patient with chronic lymphedema, worsening.  Potentially related to pulmonary hypertension, as patient has a history of mild pulmonary hypertension, though anticipate a significant contribution from the fact that patient has neuropathy and will get up at night to ambulate before sitting in her recliner and falling asleep several hours per night.  -Patient counseled on lower externally elevation and avoiding sleeping in a recliner  -Continue outpatient lymphedema follow-up.  Patient is already followed in lymphedema clinic.      Iron deficiency anemia: Improving.  Hemoglobin of 9.9 as of 4/17, currently 11.6.  As of 4/17 follow-up, iron saturation of 6%.     Nicotine dependence: Patient continues to smoke  -Discussed cessation    Chronic pain: Continue Percocet as above    # Discharge Pain Plan:   - During her hospitalization, Helene experienced pain due to chronic pain above.  The pain plan for discharge was discussed with Helene and the plan was created in a collaborative fashion.    - See above    Dolores Hazel PA-C    This patient was discussed with Dr. Cortez of the Hospitalist Service who agrees with current plans as outlined above.     Significant Results and Procedures   See below     Pending Results   These results will be followed up by PCP  Unresulted Labs Ordered in the Past 30 Days of this Admission     No orders found from 3/6/2018 to 5/6/2018.          Code Status   DNR / DNI       Primary Care Physician   Neisha Esparza    Physical Exam   Temp: 96.9  F (36.1  C) Temp src: Axillary BP: 132/74   Heart Rate: 70 Resp: 16 SpO2: 94 % O2 Device: None (Room air)    Vitals:    05/06/18 0345 05/07/18 0000 05/08/18 0100   Weight: 75.7 kg (166 lb 12.8 oz) 73.4 kg (161 lb 12.8 oz) 72.9 kg (160 lb 12.8 oz)     Vital Signs with Ranges  Temp:  [96.9  F (36.1  C)-98.6  F (37  C)] 96.9  F (36.1   C)  Heart Rate:  [69-70] 70  Resp:  [16-18] 16  BP: (125-149)/(70-85) 132/74  SpO2:  [94 %-100 %] 94 %  I/O last 3 completed shifts:  In: 398 [P.O.:360; I.V.:38]  Out: 1150 [Urine:1150]    CONSTITUTIONAL: Pt sitting upright in chair, dressed in hospital garb. Appears comfortable. Cooperative with interview.  HEENT: Normocephalic, atraumatic. Negative for conjunctival redness or scleral icterus.    CARDIOVASCULAR: RRR, no murmurs, rubs, or extra heart sounds appreciated. Pulses +2/4 and regular in upper and lower extremities, bilaterally.   RESPIRATORY: No increased work of breathing.  CTA, bilat; no wheezes, rales, or rhonchi appreciated.  GASTROINTESTINAL:  Abdomen soft, non-distended. BS auscultated in all four quadrants. Negative for tenderness to palpation.  No masses or organomegaly noted.  MUSCULOSKELETAL: No gross deformities noted. Normal muscle tone.   NEUROLOGIC: Alert and oriented to person, place, and time.  strength intact.   SKIN: Lymphedema in lower extremities, no pitting edema appreciated.     Discharge Disposition   Discharged to home  Condition at discharge: Stable    Consultations This Hospital Stay   CARDIOLOGY IP CONSULT  PHARMACY TO DOSE HEPARIN  PHARMACY TO DOSE HEPARIN    Time Spent on this Encounter   I, Dolores Hazel, personally saw the patient today and spent greater than 30 minutes discharging this patient.    Discharge Orders     Basic metabolic panel     Follow-Up with Cardiac Advanced Practice Provider     Follow-Up with Cardiologist     Home care nursing referral     Home Care PT Referral for Hospital Discharge     Home Care OT Referral for Hospital Discharge     Reason for your hospital stay   You were hospitalized for further evaluation and treatment of chest pain with findings of NSTEMI (non-ST segment myocardial infarction) and diastolic heart failure exacerbation.     Follow-up and recommended labs and tests    Follow up with primary care provider, Neisha HOWARD  V[place], within 7 days for hospital follow- up.  No follow up labs or test are needed.    Follow up with Cardiology as scheduled, follow-up labs ordered.     Activity   Your activity upon discharge: activity as tolerated     Discharge Instructions   1) Take Lisinopril 10 mg by mouth twice daily, this is a change form your prior regimen   2) Metoprolol tartrate 25 mg by mouth twice daily added, prescription sent at discharge   3) Lasix 40 mg by mouth daily prescribed at discharge  4) Follow-up with Cardiology as scheduled   5) Home RN, HHA, PT, OT ordered at discharge     MD face to face encounter   Documentation of Face to Face and Certification for Home Health Services    I certify that patient: Helene Gibbs is under my care and that I, or a nurse practitioner or physician's assistant working with me, had a face-to-face encounter that meets the physician face-to-face encounter requirements with this patient on: 5/8/2018.    This encounter with the patient was in whole, or in part, for the following medical condition, which is the primary reason for home health care: medication management, physical deconditioning.    I certify that, based on my findings, the following services are medically necessary home health services: Nursing, Occupational Therapy and Physical Therapy.    My clinical findings support the need for the above services because: Nurse is needed: medication management., Occupational Therapy Services are needed to assess and treat cognitive ability and address ADL safety due to impairment in physical deconditioning and Physical Therapy Services are needed to assess and treat the following functional impairments: physical deconditioning.    Further, I certify that my clinical findings support that this patient is homebound (i.e. absences from home require considerable and taxing effort and are for medical reasons or [employer] services or infrequently or of short duration when for other reasons)  because: homebound.    Based on the above findings. I certify that this patient is confined to the home and needs intermittent skilled nursing care, physical therapy and/or speech therapy.  The patient is under my care, and I have initiated the establishment of the plan of care.  This patient will be followed by a physician who will periodically review the plan of care.  Physician/Provider to provide follow up care:Neisha Esparza  Attending hospital physician (the Medicare certified [place] provider): Juan Cortez MD  Physician Signature: See electronic signature associated with these discharge orders.  Date: 5/8/2018     DNR/DNI     Diet   Follow this diet upon discharge: Combination Diet [id number] Calories: Moderate Consistent CHO (4-6 CHO units/meal); 2 gm NA Diet       Discharge Medications   Current Discharge Medication List      START taking these medications    Details   metoprolol tartrate (LOPRESSOR) 25 MG tablet Take 1 tablet (25 mg) by mouth 2 times daily  Qty: 60 tablet, Refills: 0    Associated Diagnoses: NSTEMI (non-ST elevated myocardial infarction) (H)         CONTINUE these medications which have CHANGED    Details   furosemide (LASIX) 40 MG tablet Take 1 tablet (40 mg) by mouth daily  Qty: 30 tablet, Refills: 0    Associated Diagnoses: Acute diastolic congestive heart failure (H)      lisinopril (PRINIVIL/ZESTRIL) 10 MG tablet Take 1 tablet (10 mg) by mouth 2 times daily  Qty: 60 tablet, Refills: 0    Comments: Use home med  Associated Diagnoses: NSTEMI (non-ST elevated myocardial infarction) (H)         CONTINUE these medications which have NOT CHANGED    Details   albuterol (PROAIR HFA, PROVENTIL HFA, VENTOLIN HFA) 108 (90 BASE) MCG/ACT inhaler Inhale 2 puffs into the lungs every 4 hours as needed for shortness of breath / dyspnea or wheezing  Qty: 1 Inhaler, Refills: 5    Associated Diagnoses: COPD (chronic obstructive pulmonary disease) (H)      apixaban ANTICOAGULANT (ELIQUIS) 2.5 MG tablet  Take 1 tablet (2.5 mg) by mouth 2 times daily  Qty: 180 tablet, Refills: 3    Associated Diagnoses: Atrial fibrillation, unspecified type (H)      fluticasone (FLONASE) 50 MCG/ACT spray Spray 1-2 sprays into both nostrils every evening      GlipiZIDE (GLUCOTROL PO) Take 5 mg by mouth every morning (before breakfast)      hydrocortisone (ANUSOL-HC) 2.5 % cream Place rectally 2 times daily  Qty: 30 g, Refills: 0    Associated Diagnoses: External hemorrhoids      hydrOXYzine (VISTARIL) 25 MG capsule Take 1 capsule (25 mg) by mouth 2 times daily as needed for itching  Qty: 180 capsule, Refills: 1    Associated Diagnoses: Itching      magnesium hydroxide (MILK OF MAGNESIA) 400 MG/5ML suspension Take 30 mLs by mouth At Bedtime       Multiple Vitamins-Minerals (PRESERVISION/LUTEIN) CAPS Take 2 capsules by mouth daily       ondansetron (ZOFRAN ODT) 4 MG ODT tab Take 1 tablet (4 mg) by mouth every 6 hours as needed for nausea  Qty: 30 tablet, Refills: 1    Associated Diagnoses: Nausea      oxyCODONE-acetaminophen (PERCOCET) 5-325 MG per tablet Take 1 tablet by mouth 3 times daily as needed for severe pain maximum 6 tablet(s) per day  Qty: 40 tablet, Refills: 0    Associated Diagnoses: Radicular pain; Lumbar spine pain; Pain in thoracic spine      ranitidine (ZANTAC) 150 MG tablet TAKE ONE TABLET BY MOUTH TWO TIMES A DAY  Qty: 180 tablet, Refills: 3    Associated Diagnoses: Dyspepsia      simvastatin (ZOCOR) 10 MG tablet TAKE 1 TABLET (10 MG) BY MOUTH AT BEDTIME  Qty: 90 tablet, Refills: 2    Associated Diagnoses: Type 2 diabetes mellitus with diabetic nephropathy, without long-term current use of insulin (H)      tiotropium (SPIRIVA) 18 MCG capsule Inhale 18 mcg into the lungs every evening      ACCU-CHEK SMARTVIEW test strip USE 1 STRIP BY IN VITRO ROUTE 2 TIMES DAILY OR AS DIRECTED  Qty: 200 strip, Refills: 1    Associated Diagnoses: Type 2 diabetes mellitus with diabetic nephropathy (H)         STOP taking these  medications       BETA BLOCKER NOT PRESCRIBED, INTENTIONAL, Comments:   Reason for Stopping:         GABAPENTIN PO Comments:   Reason for Stopping:             Allergies   Allergies   Allergen Reactions     Ambien [Zolpidem Tartrate] Visual Disturbance     Hallucinations and fell out of bed at night.     Penicillins Hives     Definity      Caused a syncopal episode.     Sulfa Drugs Itching     Cymbalta Rash     Fluconazole Rash     Data   Most Recent 3 CBC's:  Recent Labs   Lab Test  05/08/18   0630  05/07/18   0608  05/06/18   1400   WBC  4.8  5.5  5.3   HGB  11.6*  11.3*  11.7   MCV  91  90  90   PLT  170  189  209      Most Recent 3 BMP's:  Recent Labs   Lab Test  05/08/18   0630  05/07/18   0608  05/05/18   2040   NA  140  138  138   POTASSIUM  3.6  3.9  3.7   CHLORIDE  108  106  106   CO2  25  24  23   BUN  20  17  13   CR  1.18*  1.33*  1.27*   ANIONGAP  7  8  9   GERARD  8.7  8.6  9.4   GLC  118*  118*  118*     Most Recent 2 LFT's:  Recent Labs   Lab Test  05/05/18 2040 04/06/18   0728   AST  17  14   ALT  15  14   ALKPHOS  70  64   BILITOTAL  0.7  0.4     Most Recent INR's and Anticoagulation Dosing History:  Anticoagulation Dose History     Recent Dosing and Labs Latest Ref Rng & Units 1/8/2015 10/15/2015 12/3/2015 12/8/2015 8/9/2017 1/4/2018 1/5/2018    INR 0.86 - 1.14 - 1.19(H) 1.21(H) 1.21(H) 1.41(H) 0.97 1.00    INR 0.86 - 1.14 1.7(A) - - - - - -    INR Point of Care 0.86 - 1.14 - - - - - - -        Most Recent 3 Troponin's:  Recent Labs   Lab Test  05/07/18   0608  05/06/18   1400  05/06/18   0615   TROPI  <0.015  0.031  0.060*     Most Recent Cholesterol Panel:  Recent Labs   Lab Test  01/18/18   1129   CHOL  126   LDL  40   HDL  71   TRIG  76     Most Recent 6 Bacteria Isolates From Any Culture (See EPIC Reports for Culture Details):  Recent Labs   Lab Test  04/16/18   1352  04/06/18   0820  10/19/16   2035  03/16/16   1330  01/02/16   1015  12/25/15   0655   CULT  >100,000 colonies/mL  Escherichia  "coli  *  10,000 to 50,000 colonies/mL  mixed urogenital pal    10,000 to 50,000 colonies/mL mixed urogenital pal Susceptibility testing not   routinely done    50,000 to 100,000 colonies/mL mixed urogenital pal  Susceptibility testing not routinely done    Heavy growth Prevotella species Beta lactamase positive  Heavy growth Fusobacterium nucleatum  Susceptibility testing not routinely done  *  Light growth Enterococcus species  On day 1, isolated in broth only: Anaerobic gram negative rods See anaerobic   report for identification  *  Heavy growth Candida albicans / dubliniensis Candida albicans and Candida   dubliniensis are not routinely speciated Susceptibility testing not routinely   done  *     Most Recent TSH, T4 and A1c Labs:  Recent Labs   Lab Test  03/22/18   1324   04/25/17   1445   TSH   --    --   1.56   A1C  6.8*   < >  6.4*    < > = values in this interval not displayed.     Results for orders placed or performed during the hospital encounter of 05/05/18   XR Chest 2 Views    Narrative    XR CHEST 2 VW   5/5/2018 9:43 PM     HISTORY: upper abdominal pain; \"heart heavy\";     COMPARISON: Film dated 8/9/2017    FINDINGS: Cardiac silhouette is enlarged. A left cardiac device is in  place.. Slight blunting of the costophrenic angles is again noted and  is unchanged.  The lungs are clear. The pulmonary vasculature is  normal.  Patient is status post vertebral plasties at T11 and T12..      Impression    IMPRESSION: Cardiomegaly.. No significant change.        JOHNATHAN LOWRY MD   NM Lexiscan stress test (nuc card)    Narrative    GATED MYOCARDIAL PERFUSION SCINTIGRAPHY WITH INTRAVENOUS PHARMACOLOGIC  VASODILATATION LEXISCAN -ONE DAY STUDY     5/7/2018 3:46 PM  PATRICIA BOBO  83 years  Female  1935.    Indication/Clinical History:    Impression  1.  Myocardial perfusion imaging using single isotope technique  demonstrated small area of apical ischemia.   2. Gated images demonstrated normal wall " motion with a calculated  ejection fraction of 61%.    3. Compared to the prior study from 2012 the apical ischemia appears  to be new .    Procedure  Pharmacologic stress testing was performed with Lexiscan at a rate of  0.08 mg/ml rapid bolus injection, for 15 seconds, 0.4 mg/5ml  intravenously. Low-level exercise was not performed along with the  vasodilator infusion.  The heart rate was 70 at baseline and shawn to  70 beats per minute during the Lexiscan infusion. The rest blood  pressure was 144/73 mmHg and was 125/71 mm Hg during Lexiscan  infusion. The patient experienced shortness of breath  during the  test.    Myocardial perfusion imaging was performed at rest, approximately 45  minutes after the injection intravenously of 11.0 mCi of Tc-99m  Myoview. At peak pharmacologic effect, 10-20 seconds after Lexiscan,   the patient was injected intravenously with 35.9 mCi of  Tc-99m  Myoview. The post-stress tomographic imaging was performed  approximately 60 minutes after stress.    EKG Findings  The resting EKG demonstrated ventricular pacing with underlying atrial  fibrillation. The stress EKG demonstrated ventricular pacing.    Tomographic Findings  The overall quality of the study is fair. On the rest tomographic  images there is a small area of moderate decreased tracer uptake in  the apex. This appears worse on the stress tomographic images and is  consistent with a small area of apical ischemia of moderate intensity.  Gating revealed normal wall motion with a calculated ejection fraction  of 61%. The transient ischemic dilatation is calculated at 1.01.    LUIS MIGUEL GARCIA MD     *Note: Due to a large number of results and/or encounters for the requested time period, some results have not been displayed. A complete set of results can be found in Results Review.

## 2018-05-08 NOTE — PROGRESS NOTES
[facility]  Cardiology Progress Note  Date of Service: 05/08/2018  Primary Cardiologist: Dr. Juarez    Assessment & Plan    Helene Gibbs is a 83 year old female with past medical history significant for permanent atrial fibrillation/flutter, status post PPM and AV node ablation, PAD s/p stenting, T2 DM, iron deficiency anemia, and HFpEF admitted on 5/5/2018 with with worsening dyspnea on exertion and chest pain.     Assessment:  1. HFpEF:[PTA Lasix 20 mg daily, Lisinopril 20 mg in am and 10 mg pm] Acute on chronic exacerbation. EF in March was 50-55% with moderate TR and mild pulmonary htn. BNP 6738. Unsure of dry weight. Patient states her goal weight is 150-155 lbs.     Initially diuresed with IV Lasix    Now on Lasix PO 40 mg daily    Net negative 5.3 L and down 5 lbs from admission    2. NSTEMI: No previous history of CAD. Troponin 0.038-0.079-0.060. Lexiscan nuclear stress test showed a small area of apical ischemia that appears new. Given her history of PAD and other cardiac risk factors, she likely has underlying coronary artery disease. At this time we will continue medical management and risk factor modification. If she becomes increasing symptomatic, a further ischemic work up could be considered as an outpatient.    3. Permanent atrial fibrillation/flutter: [PTA: Eliquis 2.5 mg BID] Status post AV node ablation and PPM. 100% paced    Metoprolol 25 mg BID (new this admission)    4. Hypertension: [PTA: Lisinopril 20 mg in the am and 10 mg in the pm]    Lisinopril 10 mg BID    5. Hematochezia: patient reports blood in stool. Hgb stable. 11.6-11.7-11.3-11.6. History of hemorrhoids    6. CKD, Stage 3b: (baseline 1.3) creatine 1.27-1.33-1.18    7. Nicotine dependency: Nicotine patches in place    8. Hyperlipidemia:[PTA Simvastatin 10 mg daily]    9. PAD/Critical limb ischemia: Status post left SFA anterior tibial angioplasty 1/2018. Follow with Dr. Pratt in Vascular  Surgery    10. Lymphedema: Follows with lymphedema clinic    Plan:   1. Ok per cardiology for discharge today on Lasix 40 mg daily  2. Stop Heparin drip  3. Restart PTA Eliquis 2.5 mg BID  4. Follow up with ALLEN Armenta, CNP on 5/23 2:30 pm with BMP prior  5. Dr. Juarez 8/6/18 at 3:15 pm    ALLEN Barajas, CNP  [facility]  Pager: [phone]     Interval History   Diuresing well. No further chest pain.    Physical Exam   Temp: 96.9  F (36.1  C) Temp src: Axillary BP: 126/76   Heart Rate: 70 Resp: 16 SpO2: 94 % O2 Device: None (Room air)    Vitals:    05/06/18 0345 05/07/18 0000 05/08/18 0100   Weight: 75.7 kg (166 lb 12.8 oz) 73.4 kg (161 lb 12.8 oz) 72.9 kg (160 lb 12.8 oz)       Constitutional:   NAD   Skin:   Warm and dry   Head:   Nontraumatic   Neck:   no JVD   Lungs:   Crackles bilaterally   Cardiovascular:   irregularly irregular rhythm   Abdomen:   Benign   Extremities and Back:   Edema 1+   Neurological:   Grossly nonfocal       Medications     HEParin 950 Units/hr (05/08/18 0644)       aspirin EC  81 mg Oral Daily     fluticasone  1-2 spray Both Nostrils QPM     furosemide  40 mg Oral Daily     gabapentin (NEURONTIN) capsule 300 mg  300 mg Oral Daily     insulin aspart  1-6 Units Subcutaneous Q4H     lisinopril  10 mg Oral BID     metoprolol tartrate  25 mg Oral BID     nicotine  1 patch Transdermal Daily     nicotine   Transdermal Q8H     nicotine   Transdermal Daily     ranitidine  150 mg Oral At Bedtime     regadenoson  0.4 mg Intravenous Once     simvastatin  10 mg Oral At Bedtime     sodium chloride (PF)  10 mL Intravenous Once     sodium chloride (PF)  10 mL Intravenous Once     sodium chloride (PF)  3 mL Intracatheter Q8H       Data     Most Recent 3 BMP's:  Recent Labs   Lab Test  05/08/18   0630  05/07/18   0608  05/05/18   2040   NA  140  138  138   POTASSIUM  3.6  3.9  3.7   CHLORIDE  108  106  106   CO2  25  24  23   BUN  20  17  13   CR  1.18*  1.33*  1.27*   ANIONGAP  7  8  9   GERARD   8.7  8.6  9.4   GLC  118*  118*  118*     Most Recent 3 Hemoglobins:  Recent Labs   Lab Test  05/08/18   0630  05/07/18   0608  05/06/18   1400   HGB  11.6*  11.3*  11.7     Most Recent 3 Troponin's:  Recent Labs   Lab Test  05/07/18   0608  05/06/18   1400  05/06/18   0615   TROPI  <0.015  0.031  0.060*

## 2018-05-08 NOTE — PLAN OF CARE
Problem: Patient Care Overview  Goal: Plan of Care/Patient Progress Review  Outcome: No Change  Pt alert and oriented x4.  Pt in paced rhythm, rate 80s.  Pt denies shortness of breath.  Pt up to void frequently and stand by assist.  Pt uses walker.  Pt denies headache, denies nausea, denies dizziness.  Pt eating well for dinner.

## 2018-05-09 ENCOUNTER — CARE COORDINATION (OUTPATIENT)
Dept: CARDIOLOGY | Facility: CLINIC | Age: 83
End: 2018-05-09

## 2018-05-09 ENCOUNTER — TELEPHONE (OUTPATIENT)
Dept: FAMILY MEDICINE | Facility: CLINIC | Age: 83
End: 2018-05-09

## 2018-05-09 ENCOUNTER — PATIENT OUTREACH (OUTPATIENT)
Dept: CARE COORDINATION | Facility: CLINIC | Age: 83
End: 2018-05-09

## 2018-05-09 NOTE — TELEPHONE ENCOUNTER
Nstemi (Non-St Elevated Myocardial Infarction) (H), Chest Pressure 05/08/2018 6 mo ED/IP 2/1  [phone] (home)     Hosp f/u 5/14 with PCP

## 2018-05-09 NOTE — PROGRESS NOTES
Patient was evaluated by cardiology while inpatient for NSTEMI and HF. Called patient to discuss any post hospital d/c questions she may have, review medication changes, and confirm f/u appts.Patient denied any questions regarding new medications or changes to some of her current medications that she was taking prior to admission. Patient denied any SOB, chest pain, or light headedness. RN confirmed with patient that she has an apt scheduled on 5/23/18 with [facility] Angelica Grady (130pm lab and 230pm with [facility]). Patient advised to call clinic with any cardiac related questions or concerns prior to her apt on 5/23/18. Patient verbalized understanding and agreed with plan.

## 2018-05-09 NOTE — TELEPHONE ENCOUNTER
Next 5 appointments (look out 90 days)     May 14, 2018 12:30 PM CDT   [facility] with Ne[facility] MD   [facility]    [address] [facility] [address]   [phone]            May 16, 2018  1:15 PM CDT   Return Visit with  Oncology Nurse   [facility] [place] [facility])    [facility]  [address]  [facility] [address]   [phone]            May 16, 2018  2:20 PM CDT   Return Visit with Estiven Cali MD   [facility] ([facility])    [facility]  [address] [facility] [address]  [facility] [address]   [phone]                Called [facility] nurse Tara  Left message giving verbal on below requested orders     Shawanda REDI RN

## 2018-05-09 NOTE — PROGRESS NOTES
Clinic Care Coordination Contact  Care Coordination Communication         Clinical Data: Patient was hospitalized at [facility] from 05/05/2018 to 05/08/18 with diagnosis of NSTEMI.     Home Care Contact:              Home Care Agency:  Home Care RN, PT, OT, and HHA              Contact name () and phone number: Ruba Riley [phone]              Care Coordination contacted home care: Yes              Anticipated start of care date: 05/09/2018    Patient Contact:             Post-hospital discharge f/u completed by clinic RN and CORE RN today.      Plan: RN/SW Care Coordinator will await notification from home care staff informing RN/SW Care Coordinator of patients discharge plans/needs. RN/SW Care Coordinator will review chart and outreach to home care every 4 weeks and as needed.      Shakira Galloway, RN, BSN, PHN  Clinic Care Coordinator  [facility]  [phone]

## 2018-05-09 NOTE — TELEPHONE ENCOUNTER
Reason for Call:  Home Health Care    Tara with [facility] called regarding (reason for call):     Orders are needed for this patient.     PT: eval and treat x1    OT: eval and treat x1    HHA:  1w 3     Skilled Nursin w 4,  5 prn    Pt Provider: Vilma    Phone Number Homecare Nurse can be reached at: [phone]    Can we leave a detailed message on this number? YES      Call taken on 2018 at 2:53 PM by Eunice Saenz

## 2018-05-09 NOTE — TELEPHONE ENCOUNTER
"ED / Discharge Outreach Protocol    Patient Contact    Attempt # 1    Was call answered?  Yes.  \"May I please speak with <Helene>\"  Is patient available?   Yes    ED/Discharge Protocol    \"Hi, my name is Shawanda Terrell, a registered nurse, and I am calling on behalf of Dr. Esparza's office at [place].  I am calling to follow up and see how things are going for you after your recent visit.\"    \"I see that you were in the (ER/UC/IP) on 5/5/18-5/8/18.    How are you doing now that you are home?\" \"took a lot out of me. Starting home health care today-nurse will be out. Feeling 'washed out' - lots of appointments set up. Trying to get strength back. I allowed myself to get filled up with so much fluid in my chest/legs until I couldn't stand it any longer. Good evaluation in hospital and stress test per cardiology in hospital. Very tired currently. In July will be planning on moving to assisted living. Plans are in place. Cannot live by myself anymore in this apartment and take care of myself.\"     Is patient experiencing symptoms that may require a hospital visit?  No    Discharge Instructions    \"Let's review your discharge instructions.  What is/are the follow-up recommendations?  Pt. Response: Follow up with cardiology, PCP, home nursing helping. Appointment for labs - seeing NP with cardiology and cardiologist later on     \"Were you instructed to make a follow-up appointment?\"  Pt. Response: Yes.  Has appointment been made?   Yes      \"When you see the provider, I would recommend that you bring your discharge instructions with you.    Medications    \"How many new medications are you on since your hospitalization/ED visit?\"    2 or more - Epic MTM referral needed   Patient declined MTM referral - states she got a good education with cardiology     Added Metoprolol and ASA 81mg  \"How many of your current medicines changed (dose, timing, name, etc.) while you were in the hospital/ED visit?\"   Cut Lisinopril down - 0-1 " "med changes  \"Do you have questions about your medications?\"   No  \"Were you newly diagnosed with heart failure, COPD, diabetes or did you have a heart attack?\"   Yes   For patients on insulin: \"Did you start on insulin in the hospital or did you have your insulin dose changed?\"   No    Medication reconciliation completed? Yes    Was MTM referral placed (*Make sure to put transitions as reason for referral)?   No    Call Summary    \"Do you have any questions or concerns about your condition or care plan at the moment?\"    No  Triage nurse advice given: N/A    Patient was in ER 8 times in the past year (assess appropriateness of ER visits.)      \"If you have questions or things don't continue to improve, we encourage you contact us through the main clinic number, ([phone])    Even if the clinic is not open, triage nurses are available 24/7 to help you.     We would like you to know that our clinic has extended hours (provide information).  We also have urgent care (provide details on closest location and hours/contact info)\"    \"Thank you for your time and take care!\"    Shawanda REID RN        [facility]  Discharge Summary  Hospitalist     Date of Admission:  5/5/2018  Date of Discharge:  5/8/2018  Discharging Provider: Dolores Hazel PA-C  Date of Service (when I saw the patient): 05/08/18     Discharge Diagnoses  NSTEMI  HFpEF, acute exacerbation  Discharge Orders      Basic metabolic panel      Follow-Up with Cardiac Advanced Practice Provider      Follow-Up with Cardiologist      Home care nursing referral      Home Care PT Referral for Hospital Discharge      Home Care OT Referral for Hospital Discharge      Reason for your hospital stay   You were hospitalized for further evaluation and treatment of chest pain with findings of NSTEMI (non-ST segment myocardial infarction) and diastolic heart failure exacerbation.      Follow-up and recommended labs and tests    Follow up with " primary care provider, Neisha Esparza, within 7 days for hospital follow- up.  No follow up labs or test are needed.    Follow up with Cardiology as scheduled, follow-up labs ordered.      Activity   Your activity upon discharge: activity as tolerated      Discharge Instructions   1) Take Lisinopril 10 mg by mouth twice daily, this is a change form your prior regimen   2) Metoprolol tartrate 25 mg by mouth twice daily added, prescription sent at discharge   3) Lasix 40 mg by mouth daily prescribed at discharge  4) Follow-up with Cardiology as scheduled   5) Home RN, HHA, PT, OT ordered at discharge       DNR/DNI      Diet   Follow this diet upon discharge: Combination Diet 8290-2236 Calories: Moderate Consistent CHO (4-6 CHO units/meal); 2 gm NA Diet          Discharge Medications            Current Discharge Medication List             START taking these medications     Details   metoprolol tartrate (LOPRESSOR) 25 MG tablet Take 1 tablet (25 mg) by mouth 2 times daily  Qty: 60 tablet, Refills: 0     Associated Diagnoses: NSTEMI (non-ST elevated myocardial infarction) (H)                 CONTINUE these medications which have CHANGED     Details   furosemide (LASIX) 40 MG tablet Take 1 tablet (40 mg) by mouth daily  Qty: 30 tablet, Refills: 0     Associated Diagnoses: Acute diastolic congestive heart failure (H)       lisinopril (PRINIVIL/ZESTRIL) 10 MG tablet Take 1 tablet (10 mg) by mouth 2 times daily  Qty: 60 tablet, Refills: 0     Comments: Use home med  Associated Diagnoses: NSTEMI (non-ST elevated myocardial infarction) (H)                 CONTINUE these medications which have NOT CHANGED     Details   albuterol (PROAIR HFA, PROVENTIL HFA, VENTOLIN HFA) 108 (90 BASE) MCG/ACT inhaler Inhale 2 puffs into the lungs every 4 hours as needed for shortness of breath / dyspnea or wheezing  Qty: 1 Inhaler, Refills: 5     Associated Diagnoses: COPD (chronic obstructive pulmonary disease) (H)       apixaban  ANTICOAGULANT (ELIQUIS) 2.5 MG tablet Take 1 tablet (2.5 mg) by mouth 2 times daily  Qty: 180 tablet, Refills: 3     Associated Diagnoses: Atrial fibrillation, unspecified type (H)       fluticasone (FLONASE) 50 MCG/ACT spray Spray 1-2 sprays into both nostrils every evening       GlipiZIDE (GLUCOTROL PO) Take 5 mg by mouth every morning (before breakfast)       hydrocortisone (ANUSOL-HC) 2.5 % cream Place rectally 2 times daily  Qty: 30 g, Refills: 0     Associated Diagnoses: External hemorrhoids       hydrOXYzine (VISTARIL) 25 MG capsule Take 1 capsule (25 mg) by mouth 2 times daily as needed for itching  Qty: 180 capsule, Refills: 1     Associated Diagnoses: Itching       magnesium hydroxide (MILK OF MAGNESIA) 400 MG/5ML suspension Take 30 mLs by mouth At Bedtime        Multiple Vitamins-Minerals (PRESERVISION/LUTEIN) CAPS Take 2 capsules by mouth daily        ondansetron (ZOFRAN ODT) 4 MG ODT tab Take 1 tablet (4 mg) by mouth every 6 hours as needed for nausea  Qty: 30 tablet, Refills: 1     Associated Diagnoses: Nausea       oxyCODONE-acetaminophen (PERCOCET) 5-325 MG per tablet Take 1 tablet by mouth 3 times daily as needed for severe pain maximum 6 tablet(s) per day  Qty: 40 tablet, Refills: 0     Associated Diagnoses: Radicular pain; Lumbar spine pain; Pain in thoracic spine       ranitidine (ZANTAC) 150 MG tablet TAKE ONE TABLET BY MOUTH TWO TIMES A DAY  Qty: 180 tablet, Refills: 3     Associated Diagnoses: Dyspepsia       simvastatin (ZOCOR) 10 MG tablet TAKE 1 TABLET (10 MG) BY MOUTH AT BEDTIME  Qty: 90 tablet, Refills: 2     Associated Diagnoses: Type 2 diabetes mellitus with diabetic nephropathy, without long-term current use of insulin (H)       tiotropium (SPIRIVA) 18 MCG capsule Inhale 18 mcg into the lungs every evening       ACCU-CHEK SMARTVIEW test strip USE 1 STRIP BY IN VITRO ROUTE 2 TIMES DAILY OR AS DIRECTED  Qty: 200 strip, Refills: 1     Associated Diagnoses: Type 2 diabetes mellitus with  diabetic nephropathy (H)                STOP taking these medications         BETA BLOCKER NOT PRESCRIBED, INTENTIONAL, Comments:   Reason for Stopping:            GABAPENTIN PO Comments:   Reason for Stopping:

## 2018-05-14 ENCOUNTER — OFFICE VISIT (OUTPATIENT)
Dept: FAMILY MEDICINE | Facility: CLINIC | Age: 83
End: 2018-05-14
Payer: MEDICARE

## 2018-05-14 VITALS
SYSTOLIC BLOOD PRESSURE: 98 MMHG | WEIGHT: 159.2 LBS | HEART RATE: 97 BPM | TEMPERATURE: 97.7 F | HEIGHT: 66 IN | BODY MASS INDEX: 25.58 KG/M2 | DIASTOLIC BLOOD PRESSURE: 62 MMHG | OXYGEN SATURATION: 96 %

## 2018-05-14 DIAGNOSIS — F17.200 TOBACCO USE DISORDER: ICD-10-CM

## 2018-05-14 DIAGNOSIS — G89.29 CHRONIC MIDLINE LOW BACK PAIN WITH SCIATICA, SCIATICA LATERALITY UNSPECIFIED: ICD-10-CM

## 2018-05-14 DIAGNOSIS — E11.21 TYPE 2 DIABETES MELLITUS WITH DIABETIC NEPHROPATHY, WITHOUT LONG-TERM CURRENT USE OF INSULIN (H): ICD-10-CM

## 2018-05-14 DIAGNOSIS — M54.10 RADICULAR PAIN: ICD-10-CM

## 2018-05-14 DIAGNOSIS — M54.40 CHRONIC MIDLINE LOW BACK PAIN WITH SCIATICA, SCIATICA LATERALITY UNSPECIFIED: ICD-10-CM

## 2018-05-14 DIAGNOSIS — I50.30 (HFPEF) HEART FAILURE WITH PRESERVED EJECTION FRACTION (H): ICD-10-CM

## 2018-05-14 DIAGNOSIS — I21.4 NSTEMI (NON-ST ELEVATED MYOCARDIAL INFARCTION) (H): Primary | ICD-10-CM

## 2018-05-14 PROCEDURE — 99495 TRANSJ CARE MGMT MOD F2F 14D: CPT | Performed by: INTERNAL MEDICINE

## 2018-05-14 RX ORDER — LISINOPRIL 10 MG/1
10 TABLET ORAL 2 TIMES DAILY
Qty: 180 TABLET | Refills: 1 | Status: ON HOLD | OUTPATIENT
Start: 2018-05-14 | End: 2018-01-01

## 2018-05-14 RX ORDER — METOPROLOL TARTRATE 25 MG/1
25 TABLET, FILM COATED ORAL 2 TIMES DAILY
Qty: 180 TABLET | Refills: 3 | Status: SHIPPED | OUTPATIENT
Start: 2018-05-14 | End: 2019-01-01

## 2018-05-14 RX ORDER — FUROSEMIDE 40 MG
40 TABLET ORAL DAILY
Qty: 90 TABLET | Refills: 1 | Status: SHIPPED | OUTPATIENT
Start: 2018-05-14 | End: 2018-06-01

## 2018-05-14 RX ORDER — OXYCODONE AND ACETAMINOPHEN 5; 325 MG/1; MG/1
1 TABLET ORAL 2 TIMES DAILY PRN
Qty: 40 TABLET | Refills: 0 | Status: SHIPPED | OUTPATIENT
Start: 2018-05-14 | End: 2018-01-01 | Stop reason: ALTCHOICE

## 2018-05-14 NOTE — MR AVS SNAPSHOT
After Visit Summary   5/14/2018    Helene Gbibs    MRN: [id number]           Patient Information     Date Of Birth          1935        Visit Information        Provider Department      5/14/2018 12:30 PM Neisha Esparza MD [facility]        Today's Diagnoses     NSTEMI (non-ST elevated myocardial infarction) (H)    -  1    Tobacco use disorder        Chronic midline low back pain with sciatica, sciatica laterality unspecified        Radicular pain        (HFpEF) heart failure with preserved ejection fraction (H)        Type 2 diabetes mellitus with diabetic nephropathy, without long-term current use of insulin (H)          Care Instructions    Continue to take metoprolol and Eliquis  Keep your cardiology appointments  Percocet is a controlled substance. It can be habit-forming. It should be taken as prescribed. Do not mix it with alcohol. Be careful with driving. Do not lose the prescription. Do not overuse this medication.  Follow up in 6-8 weeks  Seek sooner medical attention if there is any worsening of symptoms or problems          Follow-ups after your visit        Your next 10 appointments already scheduled     May 16, 2018  1:15 PM CDT   Return Visit with  Oncology Nurse   [facility] ([facility])    [facility] [place] [id number]  [facility] [address] [phone]            May 16, 2018  2:20 PM CDT   Return Visit with Estiven Cali MD   [facility] ([facility])    [facility]  [address]  [facility] [address] [phone]            May 23, 2018  1:30 PM CDT   LAB with [facility]   [facility] [place] ([facility])    [address]  [facility] [address] [phone]           Please do not eat 10-12 hours before your appointment if you are coming in fasting for labs on lipids, cholesterol,  or glucose (sugar). This does not apply to pregnant women. Water, hot tea and black coffee (with nothing added) are okay. Do not drink other fluids, diet soda or chew gum.            May 23, 2018  2:30 PM CDT   Return Discharge with ALLEN Arrington CNP   [facility] ([facility])    [address]  [facility] [address] [phone] OPT 2            Jul 31, 2018  1:45 PM CDT   Pacemaker Check with CHASE SAN   [facility] ([facility])    [address] [address] [phone] OPT 2            Aug 06, 2018  3:15 PM CDT   [facility] EP RETURN with Jc Juarez MD   [facility] ([facility])    [address] [address] [phone] OPT 2              Who to contact     If you have questions or need follow up information about today's clinic visit or your schedule please contact [facility] directly at [phone].  Normal or non-critical lab and imaging results will be communicated to you by [employer], letter or phone within 4 business days after the clinic has received the results. If you do not hear from us within 7 days, please contact the clinic through [employer] or phone. If you have a critical or abnormal lab result, we will notify you by phone as soon as possible.  Submit refill requests through [employer] or call your pharmacy and they will forward the refill request to us. Please allow 3 business days for your refill to be completed.          Additional Information About Your Visit        [employer] Information     [employer] gives you secure access to your electronic health record. If you see a primary care provider, you can also send messages to your care team and make appointments. If you have questions, please call your primary care clinic.  If you do not have a primary care provider,  "please call [phone] and they will assist you.        Care EveryWhere ID     This is your Care EveryWhere ID. This could be used by other organizations to access your [facility] medical records  [id number]        Your Vitals Were     Pulse Temperature Height Last Period Pulse Oximetry BMI (Body Mass Index)    97 97.7  F (36.5  C) (Oral) 5' 6\" (1.676 m) (LMP Unknown) 96% 25.7 kg/m2       Blood Pressure from Last 3 Encounters:   05/14/18 98/62   05/08/18 115/76   05/04/18 163/78    Weight from Last 3 Encounters:   05/14/18 159 lb 3.2 oz (72.2 kg)   05/08/18 160 lb 12.8 oz (72.9 kg)   04/17/18 178 lb 3.2 oz (80.8 kg)              Today, you had the following     No orders found for display         Today's Medication Changes          These changes are accurate as of 5/14/18  1:02 PM.  If you have any questions, ask your nurse or doctor.               These medicines have changed or have updated prescriptions.        Dose/Directions    oxyCODONE-acetaminophen 5-325 MG per tablet   Commonly known as:  PERCOCET   This may have changed:    - when to take this  - additional instructions   Used for:  Radicular pain        Dose:  1 tablet   Take 1 tablet by mouth 2 times daily as needed for severe pain   Quantity:  40 tablet   Refills:  0            Where to get your medicines      These medications were sent to [facility] [address], [facility] [address]  [address], [place] [address], [facility] [address]     Phone:  [phone]     metoprolol tartrate 25 MG tablet         Some of these will need a paper prescription and others can be bought over the counter.  Ask your nurse if you have questions.     Bring a paper prescription for each of these medications     oxyCODONE-acetaminophen 5-325 MG per tablet               Information about OPIOIDS     PRESCRIPTION OPIOIDS: WHAT YOU NEED TO KNOW   You have a prescription for an opioid (narcotic) pain medicine. Opioids can cause addiction. If you " have a history of chemical dependency of any type, you are at a higher risk of becoming addicted to opioids. Only take this medicine after all other options have been tried. Take it for as short a time and as few doses as possible.     Do not:    Drive. If you drive while taking these medicines, you could be arrested for driving under the influence (DUI).    Operate heavy machinery    Do any other dangerous activities while taking these medicines.     Drink any alcohol while taking these medicines.      Take with any other medicines that contain acetaminophen. Read all labels carefully. Look for the word  acetaminophen  or  Tylenol.  Ask your pharmacist if you have questions or are unsure.    Store your pills in a secure place, locked if possible. We will not replace any lost or stolen medicine. If you don t finish your medicine, please throw away (dispose) as directed by your pharmacist. The [place] Pollution [employer] Agency has more information about safe disposal: https://www.pca.[facility].us/living-green/managing-unwanted-medications    All opioids tend to cause constipation. Drink plenty of water and eat foods that have a lot of fiber, such as fruits, vegetables, prune juice, apple juice and high-fiber cereal. Take a laxative (Miralax, milk of magnesia, Colace, Senna) if you don t move your bowels at least every other day.          Primary Care Provider Office Phone # Fax #    Neisha Esparza [phone] [address] [facility] [address]  [place]                [place] [address]        Goals        General    start date: 4/17/14 I will weigh myself daily and if any weight gain or shortness of breath I will call the clinic. (pt-stated)     Notes - Note created  4/17/2014  3:59 PM by Margareth Pichardo, RN    As of today's date 4/17/2014 goal is met at 0 - 25%.   Goal Status:  Active        Equal Access to Services     TONG TUCKER AH: Yariel Brady, jefe valentino, maribell downs  pamela castañedatori del castilloaan ah. So [facility] [phone].    ATENCIÓN: Si nicolla prema, tiene a gupta disposición servicios gratuitos de asistencia lingüística. Maxim al [phone].    We comply with applicable federal civil rights laws and [place] laws. We do not discriminate on the basis of race, color, national origin, age, disability, sex, sexual orientation, or gender identity.            Thank you!     Thank you for choosing [facility]  for your care. Our goal is always to provide you with excellent care. Hearing back from our patients is one way we can continue to improve our services. Please take a few minutes to complete the written survey that you may receive in the mail after your visit with us. Thank you!             Your Updated Medication List - Protect others around you: Learn how to safely use, store and throw away your medicines at www.disposemymeds.org.          This list is accurate as of 5/14/18  1:02 PM.  Always use your most recent med list.                   Brand Name Dispense Instructions for use Diagnosis    ACCU-CHEK SMARTVIEW test strip   Generic drug:  blood glucose monitoring     200 strip    USE 1 STRIP BY IN VITRO ROUTE 2 TIMES DAILY OR AS DIRECTED    Type 2 diabetes mellitus with diabetic nephropathy (H)       albuterol 108 (90 Base) MCG/ACT Inhaler    PROAIR HFA/PROVENTIL HFA/VENTOLIN HFA    1 Inhaler    Inhale 2 puffs into the lungs every 4 hours as needed for shortness of breath / dyspnea or wheezing    COPD (chronic obstructive pulmonary disease) (H)       apixaban ANTICOAGULANT 2.5 MG tablet    ELIQUIS    180 tablet    Take 1 tablet (2.5 mg) by mouth 2 times daily    Atrial fibrillation, unspecified type (H)       aspirin 81 MG EC tablet      Take 1 tablet (81 mg) by mouth daily    NSTEMI (non-ST elevated myocardial infarction) (H)       fluticasone 50 MCG/ACT spray    FLONASE     Spray 1-2 sprays into both nostrils every evening        furosemide  40 MG tablet    LASIX    30 tablet    Take 1 tablet (40 mg) by mouth daily    Acute diastolic congestive heart failure (H)       GLUCOTROL PO      Take 5 mg by mouth every morning (before breakfast)        hydrocortisone 2.5 % cream    ANUSOL-HC    30 g    Place rectally 2 times daily    External hemorrhoids       hydrOXYzine 25 MG capsule    VISTARIL    180 capsule    Take 1 capsule (25 mg) by mouth 2 times daily as needed for itching    Itching       lisinopril 10 MG tablet    PRINIVIL/ZESTRIL    60 tablet    Take 1 tablet (10 mg) by mouth 2 times daily    NSTEMI (non-ST elevated myocardial infarction) (H)       magnesium hydroxide 400 MG/5ML suspension    MILK OF MAGNESIA     Take 30 mLs by mouth At Bedtime        metoprolol tartrate 25 MG tablet    LOPRESSOR    180 tablet    Take 1 tablet (25 mg) by mouth 2 times daily    NSTEMI (non-ST elevated myocardial infarction) (H)       ondansetron 4 MG ODT tab    ZOFRAN ODT    30 tablet    Take 1 tablet (4 mg) by mouth every 6 hours as needed for nausea    Nausea       oxyCODONE-acetaminophen 5-325 MG per tablet    PERCOCET    40 tablet    Take 1 tablet by mouth 2 times daily as needed for severe pain    Radicular pain       PRESERVISION/LUTEIN Caps      Take 2 capsules by mouth daily        ranitidine 150 MG tablet    ZANTAC    180 tablet    TAKE ONE TABLET BY MOUTH TWO TIMES A DAY    Dyspepsia       simvastatin 10 MG tablet    ZOCOR    90 tablet    TAKE 1 TABLET (10 MG) BY MOUTH AT BEDTIME    Type 2 diabetes mellitus with diabetic nephropathy, without long-term current use of insulin (H)       tiotropium 18 MCG capsule    SPIRIVA     Inhale 18 mcg into the lungs every evening

## 2018-05-14 NOTE — PATIENT INSTRUCTIONS
Continue to take metoprolol and Eliquis  Keep your cardiology appointments  Percocet is a controlled substance. It can be habit-forming. It should be taken as prescribed. Do not mix it with alcohol. Be careful with driving. Do not lose the prescription. Do not overuse this medication.  Follow up in 6-8 weeks  Seek sooner medical attention if there is any worsening of symptoms or problems

## 2018-05-14 NOTE — PROGRESS NOTES
SUBJECTIVE:     Hospital Follow-up Visit:    Hospital/Nursing Home/IP Rehab Facility: [facility]  Date of Admission: 5/5/18  Date of Discharge: 5/8/18  Reason(s) for Admission: NSTEMI            Problems taking medications regularly:  None       Medication changes since discharge: yes on beta blocker       Problems adhering to non-medication therapy:  None    Summary of hospitalization:  [facility] discharge summary reviewed  Diagnostic Tests/Treatments reviewed.  Follow up needed: by cardiology  Other Healthcare Providers Involved in Patient s Care:         Homecare and Specialist appointment - cardiology  Update since discharge: improved.     Post Discharge Medication Reconciliation: discharge medications reconciled and changed, per note/orders (see AVS).  Plan of care communicated with patient     Coding guidelines for this visit:  Type of Medical   Decision Making Face-to-Face Visit       within 7 Days of discharge Face-to-Face Visit        within 14 days of discharge   Moderate Complexity [id number]   High Complexity [id number]            Patient was admitted to [facility] on 05/05/18 for NSTEMI  She was treated medically and discharged on 05/8/18  She has since improved    Hypertension  Brought in BP readings from home  Controlled    She is overwhelmed by too many appointments .  Wants to go to [facility] and will keep cardiology appointment  At this point she is more concerned about her comfort than any thing else.    Problem list and histories reviewed & adjusted, as indicated.  Additional history: as documented    Medications and Labs reviewed in EPIC    Reviewed and updated as needed this visit by clinical staff  Allergies  Meds       Reviewed and updated as needed this visit by Provider         ROS:  Constitutional, HEENT, cardiovascular, pulmonary, GI, , musculoskeletal, neuro, skin, endocrine and psych systems are negative, except as otherwise noted.    This  "document serves as a record of the services and decisions personally performed and made by Neisha Esparza MD. It was created on her behalf by Cassandra Holcomb, a trained medical scribe. The creation of this document is based on the provider's statements to the medical scribe.  Cassandra Holcomb 12:41 PM May 14, 2018    OBJECTIVE:     BP 98/62  Pulse 97  Temp 97.7  F (36.5  C) (Oral)  Ht 1.676 m (5' 6\")  Wt 72.2 kg (159 lb 3.2 oz)  LMP  (LMP Unknown)  SpO2 96%  BMI 25.7 kg/m2  Body mass index is 25.7 kg/(m^2).    GENERAL: healthy, alert and no distress  RESP: lungs clear to auscultation - no rales, rhonchi or wheezes  CV: regular rates and rhythm, normal S1 S2, no S3  PSYCH: mentation appears normal, affect normal/bright  She walks with help of walker.  She has scoliosis  Diagnostic Test Results:  none     Reviewed and discussed labs done on 05/05/18-05/08/18  Reviewed and discussed EKG, NM lexiscan stress test, and US EMIGDIO Doppler done on 05/05/18  ASSESSMENT/PLAN:     Helene was seen today for hospital f/u.    Diagnoses and all orders for this visit:    NSTEMI (non-ST elevated myocardial infarction) (H)  -     metoprolol tartrate (LOPRESSOR) 25 MG tablet; Take 1 tablet (25 mg) by mouth 2 times daily  -     lisinopril (PRINIVIL/ZESTRIL) 10 MG tablet; Take 1 tablet (10 mg) by mouth 2 times daily  Patient was admitted to [facility] on 05/05/18 for NSTEMI  She was treated medically and discharged on 05/8/18  She has since improved  Cardiology decided she didn't need angiogram  She will be treated medically  Is following up with cardiology on 05/30/18  Advised to continue with metoprolol, lisinopril, and furosemide  Advised patient to keep her cardiology appointments  Brought in BP readings from home  Controlled    (HFpEF) heart failure with preserved ejection fraction (H)  -     furosemide (LASIX) 40 MG tablet; Take 1 tablet (40 mg) by mouth daily  See above    Tobacco use disorder  Advised " patient to quit smoking  Patient has been smoking 4-5 cigarettes a day  She was not interested in listening to advice about quitting smoking  She understands the health hazards associated with smoking  However, has no plans to quit  Declined to hear any lecture.    Chronic midline low back pain with sciatica, sciatica laterality unspecified  Patient has been on long-term use of Percocet  Educated her that opioid medication is a controlled substance. It can be habit-forming. It should be taken as prescribed. Do not mix it with alcohol. Be careful with driving. Do not lose the prescription. Do not overuse this medication.  Has appointment with percocet.    Radicular pain  Comments:  thoracic spine  Orders:  -     oxyCODONE-acetaminophen (PERCOCET) 5-325 MG per tablet; Take 1 tablet by mouth 2 times daily as needed for severe pain  See above    Type 2 diabetes mellitus with diabetic nephropathy, without long-term current use of insulin (H)  Doing well  Compliant with medication  Lab Results   Component Value Date    A1C 6.8 03/22/2018    A1C 6.6 01/04/2018    A1C 6.7 08/10/2017    A1C 6.4 04/25/2017    A1C 6.2 09/27/2016       Hasn't had any chest or abdominal discomfort since discharge  Reports she's lost her appetite  Advised eating small, frequent meals  Patient has home healthcare aide and nurse at home  She is looking into assisted living  She is planning on moving in July    Patient Instructions   Continue to take metoprolol and Eliquis  Keep your cardiology appointments  Percocet is a controlled substance. It can be habit-forming. It should be taken as prescribed. Do not mix it with alcohol. Be careful with driving. Do not lose the prescription. Do not overuse this medication.  Follow up in 6-8 weeks  Seek sooner medical attention if there is any worsening of symptoms or problems    The information in this document, created by the medical scribe for me, accurately reflects the services I personally performed and  the decisions made by me. I have reviewed and approved this document for accuracy prior to leaving the patient care area.  May 14, 2018 1:09 PM    Neisha Esparza MD  [facility]

## 2018-05-18 ENCOUNTER — MEDICAL CORRESPONDENCE (OUTPATIENT)
Dept: HEALTH INFORMATION MANAGEMENT | Facility: CLINIC | Age: 83
End: 2018-05-18

## 2018-05-22 ENCOUNTER — APPOINTMENT (OUTPATIENT)
Dept: CT IMAGING | Facility: CLINIC | Age: 83
End: 2018-05-22
Attending: EMERGENCY MEDICINE
Payer: MEDICARE

## 2018-05-22 ENCOUNTER — APPOINTMENT (OUTPATIENT)
Dept: GENERAL RADIOLOGY | Facility: CLINIC | Age: 83
End: 2018-05-22
Attending: EMERGENCY MEDICINE
Payer: MEDICARE

## 2018-05-22 ENCOUNTER — HOSPITAL ENCOUNTER (EMERGENCY)
Facility: CLINIC | Age: 83
Discharge: HOME | End: 2018-05-22
Attending: EMERGENCY MEDICINE | Admitting: EMERGENCY MEDICINE
Payer: MEDICARE

## 2018-05-22 VITALS
SYSTOLIC BLOOD PRESSURE: 174 MMHG | WEIGHT: 160 LBS | OXYGEN SATURATION: 94 % | DIASTOLIC BLOOD PRESSURE: 88 MMHG | HEIGHT: 66 IN | BODY MASS INDEX: 25.71 KG/M2 | TEMPERATURE: 98 F | RESPIRATION RATE: 16 BRPM

## 2018-05-22 DIAGNOSIS — M54.6 RIGHT-SIDED THORACIC BACK PAIN, UNSPECIFIED CHRONICITY: ICD-10-CM

## 2018-05-22 LAB
ALBUMIN SERPL-MCNC: 4.3 G/DL (ref 3.4–5)
ALP SERPL-CCNC: 82 U/L (ref 40–150)
ALT SERPL W P-5'-P-CCNC: 24 U/L (ref 0–50)
ANION GAP SERPL CALCULATED.3IONS-SCNC: 7 MMOL/L (ref 3–14)
AST SERPL W P-5'-P-CCNC: 27 U/L (ref 0–45)
BASOPHILS # BLD AUTO: 0 10E9/L (ref 0–0.2)
BASOPHILS NFR BLD AUTO: 0.3 %
BILIRUB SERPL-MCNC: 0.7 MG/DL (ref 0.2–1.3)
BUN SERPL-MCNC: 23 MG/DL (ref 7–30)
CALCIUM SERPL-MCNC: 8.6 MG/DL (ref 8.5–10.1)
CHLORIDE SERPL-SCNC: 103 MMOL/L (ref 94–109)
CO2 SERPL-SCNC: 24 MMOL/L (ref 20–32)
CREAT SERPL-MCNC: 1.45 MG/DL (ref 0.52–1.04)
D DIMER PPP FEU-MCNC: 0.9 UG/ML FEU (ref 0–0.5)
DIFFERENTIAL METHOD BLD: ABNORMAL
EOSINOPHIL # BLD AUTO: 0.2 10E9/L (ref 0–0.7)
EOSINOPHIL NFR BLD AUTO: 2.2 %
ERYTHROCYTE [DISTWIDTH] IN BLOOD BY AUTOMATED COUNT: 21 % (ref 10–15)
GFR SERPL CREATININE-BSD FRML MDRD: 35 ML/MIN/1.7M2
GLUCOSE SERPL-MCNC: 164 MG/DL (ref 70–99)
HCT VFR BLD AUTO: 48 % (ref 35–47)
HGB BLD-MCNC: 15.7 G/DL (ref 11.7–15.7)
IMM GRANULOCYTES # BLD: 0 10E9/L (ref 0–0.4)
IMM GRANULOCYTES NFR BLD: 0.1 %
INTERPRETATION ECG - MUSE: NORMAL
LIPASE SERPL-CCNC: 120 U/L (ref 73–393)
LYMPHOCYTES # BLD AUTO: 1 10E9/L (ref 0.8–5.3)
LYMPHOCYTES NFR BLD AUTO: 13.2 %
MCH RBC QN AUTO: 30 PG (ref 26.5–33)
MCHC RBC AUTO-ENTMCNC: 32.7 G/DL (ref 31.5–36.5)
MCV RBC AUTO: 92 FL (ref 78–100)
MONOCYTES # BLD AUTO: 0.7 10E9/L (ref 0–1.3)
MONOCYTES NFR BLD AUTO: 9.2 %
NEUTROPHILS # BLD AUTO: 5.4 10E9/L (ref 1.6–8.3)
NEUTROPHILS NFR BLD AUTO: 75 %
NRBC # BLD AUTO: 0 10*3/UL
NRBC BLD AUTO-RTO: 0 /100
PLATELET # BLD AUTO: 172 10E9/L (ref 150–450)
POTASSIUM SERPL-SCNC: 4.6 MMOL/L (ref 3.4–5.3)
PROT SERPL-MCNC: 8.8 G/DL (ref 6.8–8.8)
RBC # BLD AUTO: 5.23 10E12/L (ref 3.8–5.2)
SODIUM SERPL-SCNC: 134 MMOL/L (ref 133–144)
TROPONIN I SERPL-MCNC: <0.015 UG/L (ref 0–0.04)
WBC # BLD AUTO: 7.3 10E9/L (ref 4–11)

## 2018-05-22 PROCEDURE — 25000128 H RX IP 250 OP 636: Performed by: EMERGENCY MEDICINE

## 2018-05-22 PROCEDURE — 71260 CT THORAX DX C+: CPT

## 2018-05-22 PROCEDURE — 25000125 ZZHC RX 250: Performed by: EMERGENCY MEDICINE

## 2018-05-22 PROCEDURE — 96374 THER/PROPH/DIAG INJ IV PUSH: CPT

## 2018-05-22 PROCEDURE — 85379 FIBRIN DEGRADATION QUANT: CPT | Performed by: EMERGENCY MEDICINE

## 2018-05-22 PROCEDURE — 99285 EMERGENCY DEPT VISIT HI MDM: CPT | Mod: 25

## 2018-05-22 PROCEDURE — 85025 COMPLETE CBC W/AUTO DIFF WBC: CPT | Performed by: EMERGENCY MEDICINE

## 2018-05-22 PROCEDURE — 93005 ELECTROCARDIOGRAM TRACING: CPT

## 2018-05-22 PROCEDURE — 80053 COMPREHEN METABOLIC PANEL: CPT | Performed by: EMERGENCY MEDICINE

## 2018-05-22 PROCEDURE — 84484 ASSAY OF TROPONIN QUANT: CPT | Performed by: EMERGENCY MEDICINE

## 2018-05-22 PROCEDURE — 71046 X-RAY EXAM CHEST 2 VIEWS: CPT

## 2018-05-22 PROCEDURE — 83690 ASSAY OF LIPASE: CPT | Performed by: EMERGENCY MEDICINE

## 2018-05-22 RX ORDER — MORPHINE SULFATE 4 MG/ML
4 INJECTION, SOLUTION INTRAMUSCULAR; INTRAVENOUS ONCE
Status: COMPLETED | OUTPATIENT
Start: 2018-05-22 | End: 2018-05-22

## 2018-05-22 RX ORDER — IOPAMIDOL 755 MG/ML
100 INJECTION, SOLUTION INTRAVASCULAR ONCE
Status: COMPLETED | OUTPATIENT
Start: 2018-05-22 | End: 2018-05-22

## 2018-05-22 RX ADMIN — MORPHINE SULFATE 4 MG: 4 INJECTION, SOLUTION INTRAMUSCULAR; INTRAVENOUS at 18:07

## 2018-05-22 RX ADMIN — IOPAMIDOL 100 ML: 755 INJECTION, SOLUTION INTRAVENOUS at 18:37

## 2018-05-22 RX ADMIN — SODIUM CHLORIDE 88 ML: 9 INJECTION, SOLUTION INTRAVENOUS at 18:37

## 2018-05-22 ASSESSMENT — ENCOUNTER SYMPTOMS
SHORTNESS OF BREATH: 0
VOMITING: 0
NAUSEA: 0

## 2018-05-22 NOTE — ED PROVIDER NOTES
History     Chief Complaint:  Chest Pain    HPI   Helene Gibbs is an 83 year old female with a history of atrial fibrillation and NSTEMI on Eliquis who presents to the emergency department for evaluation of chest pain. The patient reports she has piercing pain under her right breast and has a history of MI, prompting her to visit the emergency department. She states the pain is exacerbated when moving and feels better when she sits and put a heat pack over the area. She describes the pain as being very tight and sharp, but it feels different than her chest pain in the past. She says the pain radiates to her right ribs and is not affected by eating. She states she has swelling in her legs, but it has gone down since her arrival to the emergency department. The patient denies nausea, vomiting, and shortness of breath.     Of note, the patient states she came to the ED on 5/5/18 with chest heaviness where she had an ECG, chest x-ray, and blood-work sent for analysis, results below. She was admitted for observation where she had serial troponins, increasing initially then decreasing throughout her stay. She also had a Lexiscan nuclear stress test performed showing a small area of new apical ischemia. The patient was subsequently discharged on 5/8/18 and states she has been doing well since then.    5/5/18 ECG (20:38:25):  Indication: chest pain    Rate 70 bpm. DE interval *. QRS duration 146. QT/QTc 446/481. P-R-T axes *, -74, 94.   Ventricular-paced rhythm  abnormal ECG  agree with computer interpretation  No significant change when compared to EKG dated 11/6/13.    Interpreted at 2049 by Antonio Givens DO     5/5/18 Imaging:  Radiographic findings were communicated with the patient who voiced understanding of the findings.  XR Chest 2 views:   No significant change Results per Radiology.       5/5/18 Laboratory:  CBC: Hgb 11.6 (L),  o/w WNL (WBC 5.8, Plt 214)  CMP: Glucose 118 (H), Creatinine 1.27 (H),  "eGFR 40 (L), o/w WNL  Lipase: 67 (L)  Troponin: 0.038   BNP: 6738    Allergies:  Ambien  Penicillins  Definity  Sulfa drugs  Cymblata  Fluconazole    Medications:    Percocet  Albuterol inhaler  Eliquis  Lasix  Glucotrol  Vistaril  Lisinopril  Lopressor  Zofran     Past Medical History:    Anemia  Arthritis  Bell's Palsy  Breast Cancer  CKD  Colon polyps  CHF  COPD  CAD  Diabetes  GERD  Hyperlipidemia  Hypertension  Lumbar spinal stenosis  Obesity  Osteoporosis  Pacemaker  Permanent atrial fibrillation  Pleural effusion  PVD  Rectal stenosis  Tricuspid regurgitation  Elevated TSH  Diplopia  Melanosis of colon  Gastroesophageal reflux disease without esophagitis  Basal cell carcinoma of skin  Gastrointestinal hemorrhage  Iron deficiency  Hematoma of groin  Heart failure with preserved ejection fraction  NSTEMI    Past Surgical History:    Ablation AV node  Implant pacemaker  Joint replacement, knee  Intraocular lens implant x 2    Family History:    Hypertension  Diabetes  Coronary artery disease    Social History:  Marital Status:   [5]  Current some day smoker  Negative for alcohol use.    Review of Systems   Respiratory: Negative for shortness of breath.    Gastrointestinal: Negative for nausea and vomiting.   Musculoskeletal:        Pain below right breast   10 point review of systems performed and is negative except as above and in HPI.    Physical Exam     Patient Vitals for the past 24 hrs:   BP Temp Temp src Heart Rate Resp SpO2 Height Weight   05/22/18 1900 - - - 70 16 94 % - -   05/22/18 1813 174/88 - - - - 99 % - -   05/22/18 1630 - - - 90 (!) 33 100 % - -   05/22/18 1629 154/80 - - - - - - -   05/22/18 1615 176/78 98  F (36.7  C) Temporal 70 18 99 % 1.676 m (5' 6\") 72.6 kg (160 lb)     Physical Exam  General: Resting on the gurney, appears somewhat uncomfortable  Head:  The scalp, face, and head appear normal  Mouth/Throat: Mucus membranes are moist  CV:  Regular rate    Normal S1 and S2  No " pathological murmur   Resp:  Breath sounds clear and equal bilaterally    Non-labored, no retractions or accessory muscle use    No coarseness    No wheezing   GI:  Abdomen is soft, no rigidity    No tenderness to palpation    Negative Salazar's sign. No abdominal guarding or rebound.  MS:  Normal motor assessment of all extremities.    Good capillary refill noted.    Right ribs somewhat tender to palpation.   Skin:  No rash or lesions noted.  Neuro:   Speech is normal and fluent. No apparent deficit.  Psych: Awake. Alert.  Normal affect.      Appropriate interactions.    Emergency Department Course   ECG:  Indication: Chest Pain  Time: 1611  Vent. Rate 70 bpm. VT interval *. QRS duration 152. QT/QTc 438/473. P-R-T axis * 20 175.  Ventricular paced rhythm. Abnormal ECG. No significant change compared to EKG dated 5/6/2018. Read time: 1615.    Imaging:  Radiographic findings were communicated with the patient who voiced understanding of the findings.    XR Chest 2 views:  Stable cardiomegaly. Stable left chest pacemaker. New  patchy opacities at the left lung base identified. This may represent  developing airspace disease. Right lung is clear.  As read by Radiology.    CT Chest/Abdomen/Pelvis w Contrast:  1. No pulmonary embolism. No specific acute abnormality is seen.  2. Stable prominent pulmonary nodule at the right upper lobe that is  indeterminate. Neoplasm remains in the differential. Recommend  follow-up imaging. See below for guidelines.  3. Prominent presumed rounded atelectasis posterior left lower lobe.  Underlying stable airspace disease is not excluded.  As read by Radiology.    Laboratory:  CBC: WNL (WBC 7.3, HGB 15.7, )   CMP: Glucose 164 (H), Creatinine 1.45 (H), GFR 35 (L), o/w WNL  Troponin: <0.015  D-dimer: 0.9 (H)  Lipase: 120    Interventions:  1807: 4 mg Morphine IV    Emergency Department Course:  Past medical records, nursing notes, and vitals reviewed.  1638: I performed an exam of the  patient and obtained history, as documented above.  ECG performed, results above.  IV inserted and blood drawn. The patient was placed on continuous cardiac monitoring and pulse oximetry.  The patient was sent for a chest x-ray and CT chest/abdomen/pelvis while in the emergency department, findings above.    1935: I rechecked the patient. Explained findings to the patient. After discussing her symptoms and negative work-up, the patient states her pain radiates into her right back, and notes this feels like her chronic back pain. She was able to ambulate successfully with her walker here in the ED.    I rechecked the patient. Findings and plan explained to the patient. Patient discharged home with instructions regarding supportive care, medications, and reasons to return. The importance of close follow-up was reviewed.     Impression & Plan    Medical Decision Making:  Helene Gibbs is an 83 year old female who presents initially for chest pain below her right breast. With her cardiac history, I proceeded with a full cardiac work-up including ECG, chest x-ray, and blood-work, all of which was normal. Her D-dimer was slightly elevated, so the patient was sent for a CT of the chest/abdomen/pelvis, which demonstrated no evidence of PE or other acute abnormality. After having a conversation with the patient, she noted her pain to wrap around into her right back, and thought this felt like her typical chronic back pain. Based on history and exam, this is consistent with musculoskeletal back pain. There are no red flag symptoms to suggest acute neurologic emergency, acute spinal cord or nerve root compression or cauda equina syndrome. I do not suspect referred pain from an intraabdominal or urologic process as she had a negative CT scan. The patient was provided with pain control here in the ED and instructed to follow up with PCP in 1-2 weeks for failure to improve. The patient was given return precautions including  uncontrolled pain, loss of bowl or bladder control, fever or new onset weakness.    Diagnosis:    ICD-10-CM   1. Right-sided thoracic back pain, unspecified chronicity M54.6     Disposition: Discharged to home    Discharge Medications: None    Scribe Disclosure:  I, Jerman Castellano, am serving as a scribe on 5/22/2018 at 4:38 PM to personally document services performed by Marge Laguerre MD based on my observations and the provider's statements to me.     Jerman Castellano  5/22/2018    EMERGENCY DEPARTMENT       Marge Laguerre MD  05/23/18 [id number]

## 2018-05-22 NOTE — ED AVS SNAPSHOT
Emergency Department    [address] [place] [address]    Phone:  [phone]    Fax:  [phone]                                       Helene Gibbs   MRN: [id number]    Department:   Emergency Department   Date of Visit:  5/22/2018           After Visit Summary Signature Page     I have received my discharge instructions, and my questions have been answered. I have discussed any challenges I see with this plan with the nurse or doctor.    ..........................................................................................................................................  Patient/Patient Representative Signature      ..........................................................................................................................................  Patient Representative Print Name and Relationship to Patient    ..................................................               ................................................  Date                                            Time    ..........................................................................................................................................  Reviewed by Signature/Title    ...................................................              ..............................................  Date                                                            Time

## 2018-05-22 NOTE — ED AVS SNAPSHOT
Emergency Department    [address] [facility] [address]    Phone:  [phone]    Fax:  [phone]                                       Helene Gibbs   MRN: [id number]    Department:   Emergency Department   Date of Visit:  5/22/2018           Patient Information     Date Of Birth          1935        Your diagnoses for this visit were:     Right-sided thoracic back pain, unspecified chronicity        You were seen by Marge Laguerre MD.      Follow-up Information     Follow up with Neisha Esparza MD. Schedule an appointment as soon as possible for a visit in 2 days.    Specialty:  Internal Medicine    Contact information:    [address] [address] [phone]          Follow up with  Emergency Department.    Specialty:  EMERGENCY MEDICINE    Why:  As needed, If symptoms worsen    Contact information:    [address] [facility] [address] [phone]        Discharge Instructions         Discharge Instructions  Back Pain  You were seen today for back pain. Back pain can have many causes, but most will get better without surgery or other specific treatment. Sometimes there is a herniated ( slipped ) disc. We don t usually do MRI scans to look for these right away, since most herniated discs will get better on their own with time.  Today, we did not find any evidence that your back pain was caused by a serious condition, such as an infection, fracture, or tumor. However, sometimes symptoms develop over time and cannot be found during an emergency visit, so it is very important that you follow up with your primary doctor.  Return to the Emergency Department if:    You develop a fever with your back pain.     You have weakness or change in sensation in one or both legs.    You lose control of your bowels or bladder, or can t empty your bladder.    Your pain gets much worse.     Follow-up with your doctor:    Unless your pain  has completely gone away, please make an appointment with your doctor within one week.  You may need further management of your back pain, such as more pain medication, imaging such as an X-ray or MRI, or physical therapy.    What can I do to help myself?    Remain Active -- People are often afraid that they will hurt their back further or delay recovery by remaining active, but this is one of the best things you can do for your back. In fact, prolonged bed rest is not recommended. Studies have shown that people with low back pain recover faster when they remain active. Movement helps to bring blood flow to the muscles and relieve muscle spasms as well as preventing loss of muscle strength.    Heat -- Using a heating pad can help with low back pain during the first few weeks. Do not sleep with a heating pad, as you can be burned.     Pain medications - You may take a pain medication such as Tylenol  (acetaminophen), Advil , Nuprin  (ibuprofen) or Aleve  (naproxen).  If you have been given a narcotic such as Vicodin  (hydrocodone with acetaminophen), Percocet  (oxycodone with acetaminophen), codeine, or a muscle relaxant such as Flexeril  (cyclobenzaprine) or Soma  (carisoprodol), do not drive for four hours after you have taken it. If the narcotic contains Tylenol  (acetaminophen), do not take Tylenol  with it. All narcotics will cause constipation, so eat a high fiber diet.   If you were given a prescription for medicine here today, be sure to read all of the information (including the package insert) that comes with your prescription.  This will include important information about the medicine, its side effects, and any warnings that you need to know about.  The pharmacist who fills the prescription can provide more information and answer questions you may have about the medicine.  If you have questions or concerns that the pharmacist cannot address, please call or return to the Emergency Department.        Remember that you can always come back to the Emergency Department if you are not able to see your regular doctor in the amount of time listed above, if you get any new symptoms, or if there is anything that worries you.        Your next 10 appointments already scheduled     May 23, 2018  1:30 PM CDT   [facility] ALLEN Garcia C[facility]   [facility]    [address] [facility] [address] [phone]           Bring a current list of meds and any records pertaining to this visit. For Physicals, please bring immunization records and any forms needing to be filled out. Please arrive 10 minutes early to complete paperwork.            May 24, 2018  1:15 PM CDT   Return Visit with  Oncology Nurse   [facility] ([facility])    [facility]  [address]  [facility] [address]   [phone]            May 24, 2018  2:20 PM CDT   Return Visit with Estiven Cali MD   [facility] ([facility])    [facility]  [address]  [facility] [address] [phone]            Jun 01, 2018 12:30 PM CDT   LAB with [facility]   [facility] ([facility])    [id number]  [facility] [address] [phone]           Please do not eat 10-12 hours before your appointment if you are coming in fasting for labs on lipids, cholesterol, or glucose (sugar). This does not apply to pregnant women. Water, hot tea and black coffee (with nothing added) are okay. Do not drink other fluids, diet soda or chew gum.            Jun 01, 2018  1:30 PM CDT   Return Visit with ALLEN Arrington CNP   [facility] ([facility])    [address]  [facility] [address]   [phone] OPT 2            Jul 17, 2018 12:30 PM CDT   Office [facility] with Neisha MINAYA  MD [facility]    [address] [facility] [address] [phone]           Bring a current list of meds and any records pertaining to this visit. For Physicals, please bring immunization records and any forms needing to be filled out. Please arrive 10 minutes early to complete paperwork.            Jul 31, 2018  1:45 PM CDT   Pacemaker Check with CHASE SAN   [facility] ([facility])    [id number]  [facility] [address] [phone] OPT 2            Aug 06, 2018  3:15 PM CDT   [facility] EP RETURN with Jc Juarez MD   [facility] ([facility])    [id number]  [facility] [id number] [phone] OPT 2              24 Hour Appointment Hotline       To make an appointment at any [facility], call [phone] ([phone]). If you don't have a family doctor or clinic, we will help you find one. [facility] are conveniently located to serve the needs of you and your family.             Review of your medicines      Our records show that you are taking the medicines listed below. If these are incorrect, please call your family doctor or clinic.        Dose / Directions Last dose taken    ACCU-CHEK SMARTVIEW test strip   Quantity:  200 strip   Generic drug:  blood glucose monitoring        USE 1 STRIP BY IN VITRO ROUTE 2 TIMES DAILY OR AS DIRECTED   Refills:  1        albuterol 108 (90 Base) MCG/ACT Inhaler   Commonly known as:  PROAIR HFA/PROVENTIL HFA/VENTOLIN HFA   Dose:  2 puff   Quantity:  1 Inhaler        Inhale 2 puffs into the lungs every 4 hours as needed for shortness of breath / dyspnea or wheezing   Refills:  5        apixaban ANTICOAGULANT 2.5 MG tablet   Commonly known as:  ELIQUIS   Dose:  2.5 mg   Quantity:  180 tablet        Take 1 tablet (2.5 mg) by mouth 2 times daily   Refills:  3        aspirin  81 MG EC tablet   Dose:  81 mg        Take 1 tablet (81 mg) by mouth daily   Refills:  0        fluticasone 50 MCG/ACT spray   Commonly known as:  FLONASE   Dose:  1-2 spray        Spray 1-2 sprays into both nostrils every evening   Refills:  0        furosemide 40 MG tablet   Commonly known as:  LASIX   Dose:  40 mg   Quantity:  90 tablet        Take 1 tablet (40 mg) by mouth daily   Refills:  1        GLUCOTROL PO   Dose:  5 mg        Take 5 mg by mouth every morning (before breakfast)   Refills:  0        hydrocortisone 2.5 % cream   Commonly known as:  ANUSOL-HC   Quantity:  30 g        Place rectally 2 times daily   Refills:  0        hydrOXYzine 25 MG capsule   Commonly known as:  VISTARIL   Dose:  25 mg   Quantity:  180 capsule        Take 1 capsule (25 mg) by mouth 2 times daily as needed for itching   Refills:  1        lisinopril 10 MG tablet   Commonly known as:  PRINIVIL/ZESTRIL   Dose:  10 mg   Quantity:  180 tablet        Take 1 tablet (10 mg) by mouth 2 times daily   Refills:  1        magnesium hydroxide 400 MG/5ML suspension   Commonly known as:  MILK OF MAGNESIA   Dose:  30 mL        Take 30 mLs by mouth At Bedtime   Refills:  0        metoprolol tartrate 25 MG tablet   Commonly known as:  LOPRESSOR   Dose:  25 mg   Quantity:  180 tablet        Take 1 tablet (25 mg) by mouth 2 times daily   Refills:  3        ondansetron 4 MG ODT tab   Commonly known as:  ZOFRAN ODT   Dose:  4 mg   Quantity:  30 tablet        Take 1 tablet (4 mg) by mouth every 6 hours as needed for nausea   Refills:  1        oxyCODONE-acetaminophen 5-325 MG per tablet   Commonly known as:  PERCOCET   Dose:  1 tablet   Quantity:  40 tablet        Take 1 tablet by mouth 2 times daily as needed for severe pain   Refills:  0        PRESERVISION/LUTEIN Caps   Dose:  2 capsule        Take 2 capsules by mouth daily   Refills:  0        ranitidine 150 MG tablet   Commonly known as:  ZANTAC   Quantity:  180 tablet        TAKE ONE TABLET  BY MOUTH TWO TIMES A DAY   Refills:  3        simvastatin 10 MG tablet   Commonly known as:  ZOCOR   Quantity:  90 tablet        TAKE 1 TABLET (10 MG) BY MOUTH AT BEDTIME   Refills:  2        tiotropium 18 MCG capsule   Commonly known as:  SPIRIVA   Dose:  18 mcg        Inhale 18 mcg into the lungs every evening   Refills:  0                Procedures and tests performed during your visit     CBC with platelets differential    CT Chest/Abdomen/Pelvis w Contrast    Comprehensive metabolic panel    D dimer quantitative    EKG 12 lead    Lipase    Troponin I    XR Chest 2 Views      Orders Needing Specimen Collection     None      Pending Results     Date and Time Order Name Status Description    5/22/2018 1738 CT Chest/Abdomen/Pelvis w Contrast Preliminary     5/22/2018 1647 XR Chest 2 Views Preliminary             Pending Culture Results     No orders found from 5/20/2018 to 5/23/2018.            Pending Results Instructions     If you had any lab results that were not finalized at the time of your Discharge, you can call the ED Lab Result RN at [phone]. You will be contacted by this team for any positive Lab results or changes in treatment. The nurses are available 7 days a week from 10A to 6:30P.  You can leave a message 24 hours per day and they will return your call.        Test Results From Your Hospital Stay        5/22/2018  4:56 PM      Component Results     Component Value Ref Range & Units Status    WBC 7.3 4.0 - 11.0 10e9/L Final    RBC Count 5.23 (H) 3.8 - 5.2 10e12/L Final    Hemoglobin 15.7 11.7 - 15.7 g/dL Final    Hematocrit 48.0 (H) 35.0 - 47.0 % Final    MCV 92 78 - 100 fl Final    MCH 30.0 26.5 - 33.0 pg Final    MCHC 32.7 31.5 - 36.5 g/dL Final    RDW 21.0 (H) 10.0 - 15.0 % Final    Platelet Count 172 150 - 450 10e9/L Final    Diff Method Automated Method  Final    % Neutrophils 75.0 % Final    % Lymphocytes 13.2 % Final    % Monocytes 9.2 % Final    % Eosinophils 2.2 % Final    % Basophils  0.3 % Final    % Immature Granulocytes 0.1 % Final    Nucleated RBCs 0 0 /100 Final    Absolute Neutrophil 5.4 1.6 - 8.3 10e9/L Final    Absolute Lymphocytes 1.0 0.8 - 5.3 10e9/L Final    Absolute Monocytes 0.7 0.0 - 1.3 10e9/L Final    Absolute Eosinophils 0.2 0.0 - 0.7 10e9/L Final    Absolute Basophils 0.0 0.0 - 0.2 10e9/L Final    Abs Immature Granulocytes 0.0 0 - 0.4 10e9/L Final    Absolute Nucleated RBC 0.0  Final         5/22/2018  5:11 PM      Component Results     Component Value Ref Range & Units Status    D Dimer 0.9 (H) 0.0 - 0.50 ug/ml FEU Final    This D-dimer assay is intended for use in conjunction with a clinical pretest   probability assessment model to exclude pulmonary embolism (PE) and deep   venous thrombosis (DVT) in outpatients suspected of PE or DVT. The cut-off   value is 0.5 ug/mL FEU.           5/22/2018  5:15 PM      Component Results     Component Value Ref Range & Units Status    Sodium 134 133 - 144 mmol/L Final    Potassium 4.6 3.4 - 5.3 mmol/L Final    Chloride 103 94 - 109 mmol/L Final    Carbon Dioxide 24 20 - 32 mmol/L Final    Anion Gap 7 3 - 14 mmol/L Final    Glucose 164 (H) 70 - 99 mg/dL Final    Urea Nitrogen 23 7 - 30 mg/dL Final    Creatinine 1.45 (H) 0.52 - 1.04 mg/dL Final    GFR Estimate 35 (L) >60 mL/min/1.7m2 Final    Non  GFR Calc    GFR Estimate If Black 42 (L) >60 mL/min/1.7m2 Final    African American GFR Calc    Calcium 8.6 8.5 - 10.1 mg/dL Final    Bilirubin Total 0.7 0.2 - 1.3 mg/dL Final    Albumin 4.3 3.4 - 5.0 g/dL Final    Protein Total 8.8 6.8 - 8.8 g/dL Final    Alkaline Phosphatase 82 40 - 150 U/L Final    ALT 24 0 - 50 U/L Final    AST 27 0 - 45 U/L Final         5/22/2018  5:10 PM      Component Results     Component Value Ref Range & Units Status    Lipase 120 73 - 393 U/L Final         5/22/2018  5:15 PM      Component Results     Component Value Ref Range & Units Status    Troponin I ES <0.015 0.000 - 0.045 ug/L Final    The 99th  percentile for upper reference range is 0.045 ug/L.  Troponin values   in the range of 0.045 - 0.120 ug/L may be associated with risks of adverse   clinical events.           5/22/2018  5:45 PM      Narrative     CHEST TWO VIEW   5/22/2018 5:25 PM     HISTORY: Chest pain.     COMPARISON: 5/5/2018.        Impression     IMPRESSION: Stable cardiomegaly. Stable left chest pacemaker. New  patchy opacities at the left lung base identified. This may represent  developing airspace disease. Right lung is clear.         5/22/2018  7:00 PM      Narrative     CT CHEST/ABDOMEN/PELVIS WITH CONTRAST  5/22/2018 6:42 PM    HISTORY: Right upper quadrant pain, elevated D-dimer.     TECHNIQUE: CT scan obtained of the chest, abdomen, and pelvis with  oral and IV contrast. 100 mL Isovue-370 injected. Radiation dose for  this scan was reduced using automated exposure control, adjustment of  the mA and/or kV according to patient size, or iterative  reconstruction technique.    COMPARISON: CT chest, abdomen, and pelvis 4/6/2018.    FINDINGS:  Chest: No evidence for pulmonary embolism. Limited opacification of  the thoracic aorta limits assessment. Thoracic aortic and coronary  artery calcifications identified. Multichamber cardiomegaly. Irregular  1.2 cm nodule is overall stable in size at the right upper lobe series  6 image 44. Stable nodule lateral right lower lobe is 0.2 cm image 87.  Prominent focal area of consolidation or rounded atelectasis posterior  left lower lobe image 91 appears stable. No new airspace disease. No  effusions. No adenopathy identified.    Abdomen/pelvis: Liver, spleen, adrenals, pancreas, and kidneys do not  show any acute abnormalities. Renal cortical thinning and multiple  renal cysts again identified. Mild left adrenal nodular thickening  appears stable. Diffuse vascular calcifications.    No bowel obstruction. No acute inflammatory change of the bowel  identified. A few small bowel loops are mildly  distended.    Multilevel vertebroplasty change is again identified, stable at the  thoracolumbar spine.        Impression     IMPRESSION:  1. No pulmonary embolism. No specific acute abnormality is seen.  2. Stable prominent pulmonary nodule at the right upper lobe that is  indeterminate. Neoplasm remains in the differential. Recommend  follow-up imaging. See below for guidelines.  3. Prominent presumed rounded atelectasis posterior left lower lobe.  Underlying stable airspace disease is not excluded.    Recommendations for an incidental lung nodule > 8mm:    Low risk patients: Consider follow-up CT in 3 months, PET/CT, and/or  tissue sampling.    High risk patients: Same as for low risk patients.    *Low Risk: Minimal or absent history of smoking or other known risk  factors.  *Nonsolid (ground-glass) or partly solid nodules may require longer  follow-up to exclude indolent adenocarcinoma.  *Recommendations based on Guidelines for the Management of Incidental  Pulmonary Nodules Detected at CT: From the Fleischner Society 2017,  Radiology 2017                Clinical Quality Measure: Blood Pressure Screening     Your blood pressure was checked while you were in the emergency department today. The last reading we obtained was  BP: 174/88 . Please read the guidelines below about what these numbers mean and what you should do about them.  If your systolic blood pressure (the top number) is less than 120 and your diastolic blood pressure (the bottom number) is less than 80, then your blood pressure is normal. There is nothing more that you need to do about it.  If your systolic blood pressure (the top number) is 120-139 or your diastolic blood pressure (the bottom number) is 80-89, your blood pressure may be higher than it should be. You should have your blood pressure rechecked within a year by a primary care provider.  If your systolic blood pressure (the top number) is 140 or greater or your diastolic blood pressure  (the bottom number) is 90 or greater, you may have high blood pressure. High blood pressure is treatable, but if left untreated over time it can put you at risk for heart attack, stroke, or kidney failure. You should have your blood pressure rechecked by a primary care provider within the next 4 weeks.  If your provider in the emergency department today gave you specific instructions to follow-up with your doctor or provider even sooner than that, you should follow that instruction and not wait for up to 4 weeks for your follow-up visit.        Thank you for choosing [facility]       Thank you for choosing [facility] for your care. Our goal is always to provide you with excellent care. Hearing back from our patients is one way we can continue to improve our services. Please take a few minutes to complete the written survey that you may receive in the mail after you visit with us. Thank you!        [facility] Information     [employer] gives you secure access to your electronic health record. If you see a primary care provider, you can also send messages to your care team and make appointments. If you have questions, please call your primary care clinic.  If you do not have a primary care provider, please call [phone] and they will assist you.        Care EveryWhere ID     This is your Care EveryWhere ID. This could be used by other organizations to access your [facility] medical records  [id number]        Equal Access to Services     TONG TUCKER : Yariel bundyo Caitlyn, waaxda luqadaha, qaybta kaalmada ademaik, pamela nolasco. So [facility] [phone].    ATENCIÓN: Si habla español, tiene a gupta disposición servicios gratuitos de asistencia lingüística. Llame al [phone].    We comply with applicable federal civil rights laws and [place] laws. We do not discriminate on the basis of race, color, national origin, age, disability, sex, sexual orientation, or gender identity.             After Visit Summary       This is your record. Keep this with you and show to your community pharmacist(s) and doctor(s) at your next visit.

## 2018-05-23 ENCOUNTER — TELEPHONE (OUTPATIENT)
Dept: CARDIOLOGY | Facility: CLINIC | Age: 83
End: 2018-05-23

## 2018-05-23 ENCOUNTER — PATIENT OUTREACH (OUTPATIENT)
Dept: CARE COORDINATION | Facility: CLINIC | Age: 83
End: 2018-05-23

## 2018-05-23 ENCOUNTER — TELEPHONE (OUTPATIENT)
Dept: FAMILY MEDICINE | Facility: CLINIC | Age: 83
End: 2018-05-23

## 2018-05-23 DIAGNOSIS — G89.29 CHRONIC BACK PAIN, UNSPECIFIED BACK LOCATION, UNSPECIFIED BACK PAIN LATERALITY: Primary | ICD-10-CM

## 2018-05-23 DIAGNOSIS — M54.9 CHRONIC BACK PAIN, UNSPECIFIED BACK LOCATION, UNSPECIFIED BACK PAIN LATERALITY: Primary | ICD-10-CM

## 2018-05-23 RX ORDER — LIDOCAINE 4 G/G
1 PATCH TOPICAL EVERY 24 HOURS
Qty: 30 PATCH | Refills: 1 | Status: SHIPPED | OUTPATIENT
Start: 2018-05-23 | End: 2018-01-01

## 2018-05-23 NOTE — TELEPHONE ENCOUNTER
She can use OTC Lido care patches.  Work very similar to prescription one  Script of OTC patch  is sent to pharmacy.  Dr.Nasima Vilma MD

## 2018-05-23 NOTE — PROGRESS NOTES
Clinic Care Coordination Contact    Clinic Care Coordination Contact  OUTREACH    Referral Information:  Referral Source: ED Follow-Up    Primary Diagnosis: Other (include Comment box) (Pain )    Chief Complaint   Patient presents with     Clinic Care Coordination - [facility]     ED follow up on 5/22        [place] Utilization:   Clinic Utilization: Follow up appointment scheduled in July. Patient will call the clinic if needed.   Difficulty keeping appointments:: No  Utilization    Last refreshed: 5/22/2018 10:35 PM:  No Show Count (past year) 0       Last refreshed: 5/22/2018 10:35 PM:  ED visits 4       Last refreshed: 5/22/2018 10:35 PM:  Hospital admissions 3          Current as of: 5/22/2018 10:35 PM             Clinical Concerns:  Current Medical Concerns: Patient recently seen in the ED on 5/22 due to having increased chest pain. All tests conducted were unremarkable. Spoke with the patient today who stated that she is doing ok. Patient still having some intermittent pain. Patient does have a follow up appointment with [facility] on 6/4. Patient does not feel she needs to come into the clinic. Patient will update PCP if needed.   Current Behavioral Concerns: No behavioral concerns at this time.   Education Provided to patient: Care Coordination Education. Pain management. Nutrition education.    Pain  Chronic pain (GOAL):: Yes  Location of chronic pain:: Upper Chest   Chronic pain timing:: Intermittent  Chronic pain severity:: 6  Limitation of routine activities due to chronic pain:: Yes  Health Maintenance Reviewed: Due/Overdue     Medication Management:  Patient manages her own medications at this time. Patient adheres to her regimen. Patient voiced that she continues to take her Percocet as prescribed with small relief. Patient denied any side effects at this time.     Functional Status:  Dependent ADLs:: Ambulation-walker  Bed or wheelchair confined:: No  Mobility Status: Independent w/Device  Fallen 2  or more times in the past year?: No  Any fall with injury in the past year?: No    Living Situation:  Current living arrangement:: I live alone    Diet/Exercise/Sleep:  Inadequate nutrition (GOAL):: Yes--Patient voiced no appetite and weight loss.   Food Insecurity: No  Exercise:: Currently not exercising    Transportation:  Transportation concerns (GOAL):: No  Transportation means:: Regular car, Family     Psychosocial:  Uatsdin or spiritual beliefs that impact treatment:: No  Informal Support system:: Family     Financial/Insurance: Medicare Insurance. No financial concerns at this time.   Financial/Insurance concerns (GOAL):: No       Resources and Interventions:  Community Resources: [facility]     Equipment Currently Used at Home: walker, rolling    Advance Care Plan/Directive  Advanced Care Plans/Directives on file:: Yes  Type Advanced Care Plans/Directives: Advanced Directive - On File, POLST          Goals:   Goals        Diet    Have 3 meals a day     Notes - Note created  5/23/2018 10:20 AM by Crystal Burciaga RN    Goal Statement: I will try to eat 3 small meals a day   Measure of Success: PCP will continue to weigh the patient at her appointments and monitor for increased weight loss.   Supportive Steps to Achieve: Education on the importance of eating 3 meals day.   Barriers: Loss of appetite stated by the patient.   Strengths: Patient moving to [facility] in July with meals included.   Date to Achieve By: August 1, 2018          General    Healthy Eating (pt-stated)     Pain Management (pt-stated)     Pain Management (pt-stated)     Notes - Note created  5/23/2018 10:17 AM by Crystal Burciaga RN    Goal Statement: I will continue to take my pain medication as prescribed. I will call my physician if the pain gets worse.   Measure of Success: Patient will stay out of the ED. Patient will call her PCP if needed. RN CC will continue to call the patient to assess how her pain is.   Supportive Steps to  Achieve: RN CC will reach out monthly to assess pain. Patient will continue to keep her appointments.   Strengths: Good PCP support. Appointment scheduled with [facility].   Date to Achieve By: August 1, 2018      start date: 4/17/14 I will weigh myself daily and if any weight gain or shortness of breath I will call the clinic. (pt-stated)     Notes - Note created  4/17/2014  3:59 PM by Margareth Pichardo RN    As of today's date 4/17/2014 goal is met at 0 - 25%.   Goal Status:  Active              Patient/Caregiver understanding: Patient denied any concerns at this time. Patient understood the role of the RN CC.     Outreach Frequency: monthly  Future Appointments              In 1 week [facility]    In 1 week Angelica Grady APRN CNP [facility]    In 1 month Neisha Esparza MD [facility],     In 2 months [facility]    In 2 months Jc Juarez MD [facility]          Plan:   1. Patient will continue to take her pain medication as prescribed. Patient will call the clinic with concerns.   2. Patient will try to eat small meals to help with weightloss.   3. RN CC will call the patient in 1 month to assess progress.     Tomasa Wagner RN  Clinic Care Coordinator  [phone]  [email or web address]

## 2018-05-23 NOTE — TELEPHONE ENCOUNTER
Reason for call:  Patient reporting a symptom    Symptom or request: patient suffered an muscle strain/pull in back.    Duration (how long have symptoms been present): a few days    Have you been treated for this before? No-Was seen at ER yesterday    Additional comments: patient would like a medicated patch for pain prescribed.      Prescription can be sent to [facility] at [address] [facility]    Phone Number patient can be reached at:  Home number on file [phone] (home)    Best Time:  asap    Can we leave a detailed message on this number:  YES    Call taken on 5/23/2018 at 10:26 AM by Alysa Carrizales.

## 2018-05-23 NOTE — ED NOTES
Pt was able to ambulate with the assistance of her walker.  Pt walked with steady gait.  Pt said that she was in pain all over, and needs to stay in the hospital tonight.  She said she does not feel safe going home.  MD advised.

## 2018-05-23 NOTE — TELEPHONE ENCOUNTER
To PCP:     Can pt use OTC Lido-Care patches?     Pharmacy pended if Lido-derm is more appropriate.     Please advise,     Thanks!    Glory GIBBS RN

## 2018-05-23 NOTE — TELEPHONE ENCOUNTER
"Patient calling in. She was able to get in earlier for a Cortizone injection in her back due to a sudden increase of disabling pain. She needs to hold OAC X 48 hours. States\" I have done this before\". I returned her call. Explained that there is always a risk at stopping due to high CHADs score but ok to proceed as not stopping could prove to be dangerous as well. WILBERT Rock  "

## 2018-05-25 ENCOUNTER — MYC MEDICAL ADVICE (OUTPATIENT)
Dept: FAMILY MEDICINE | Facility: CLINIC | Age: 83
End: 2018-05-25

## 2018-06-01 ENCOUNTER — OFFICE VISIT (OUTPATIENT)
Dept: CARDIOLOGY | Facility: CLINIC | Age: 83
End: 2018-06-01
Attending: NURSE PRACTITIONER
Payer: MEDICARE

## 2018-06-01 ENCOUNTER — OFFICE VISIT (OUTPATIENT)
Dept: FAMILY MEDICINE | Facility: CLINIC | Age: 83
End: 2018-06-01
Payer: MEDICARE

## 2018-06-01 VITALS
HEIGHT: 66 IN | DIASTOLIC BLOOD PRESSURE: 77 MMHG | SYSTOLIC BLOOD PRESSURE: 135 MMHG | HEART RATE: 82 BPM | WEIGHT: 154.7 LBS | BODY MASS INDEX: 24.86 KG/M2

## 2018-06-01 VITALS
SYSTOLIC BLOOD PRESSURE: 150 MMHG | WEIGHT: 154 LBS | TEMPERATURE: 96.9 F | OXYGEN SATURATION: 97 % | DIASTOLIC BLOOD PRESSURE: 87 MMHG | HEART RATE: 73 BPM | BODY MASS INDEX: 24.75 KG/M2 | HEIGHT: 66 IN

## 2018-06-01 DIAGNOSIS — I50.33 ACUTE ON CHRONIC DIASTOLIC CONGESTIVE HEART FAILURE (H): ICD-10-CM

## 2018-06-01 DIAGNOSIS — R30.0 DYSURIA: Primary | ICD-10-CM

## 2018-06-01 DIAGNOSIS — I21.4 NSTEMI (NON-ST ELEVATED MYOCARDIAL INFARCTION) (H): ICD-10-CM

## 2018-06-01 DIAGNOSIS — N18.30 CKD (CHRONIC KIDNEY DISEASE) STAGE 3, GFR 30-59 ML/MIN (H): ICD-10-CM

## 2018-06-01 DIAGNOSIS — I10 BENIGN ESSENTIAL HYPERTENSION: ICD-10-CM

## 2018-06-01 DIAGNOSIS — Z53.9 NO SHOW: ICD-10-CM

## 2018-06-01 DIAGNOSIS — I50.30 (HFPEF) HEART FAILURE WITH PRESERVED EJECTION FRACTION (H): Primary | ICD-10-CM

## 2018-06-01 LAB
ALBUMIN UR-MCNC: 30 MG/DL
ANION GAP SERPL CALCULATED.3IONS-SCNC: 10.1 MMOL/L (ref 6–17)
APPEARANCE UR: ABNORMAL
BACTERIA #/AREA URNS HPF: ABNORMAL /HPF
BILIRUB UR QL STRIP: NEGATIVE
BUN SERPL-MCNC: 32 MG/DL (ref 7–30)
CALCIUM SERPL-MCNC: 9.8 MG/DL (ref 8.5–10.5)
CHLORIDE SERPL-SCNC: 96 MMOL/L (ref 98–107)
CO2 SERPL-SCNC: 30 MMOL/L (ref 23–29)
COLOR UR AUTO: YELLOW
CREAT SERPL-MCNC: 1.49 MG/DL (ref 0.7–1.3)
GFR SERPL CREATININE-BSD FRML MDRD: 34 ML/MIN/1.7M2
GLUCOSE SERPL-MCNC: 161 MG/DL (ref 70–105)
GLUCOSE UR STRIP-MCNC: NEGATIVE MG/DL
HGB UR QL STRIP: NEGATIVE
KETONES UR STRIP-MCNC: NEGATIVE MG/DL
LEUKOCYTE ESTERASE UR QL STRIP: NEGATIVE
NITRATE UR QL: NEGATIVE
NON-SQ EPI CELLS #/AREA URNS LPF: ABNORMAL /LPF
PH UR STRIP: 7 PH (ref 5–7)
POTASSIUM SERPL-SCNC: 5.1 MMOL/L (ref 3.5–5.1)
RBC #/AREA URNS AUTO: ABNORMAL /HPF
SODIUM SERPL-SCNC: 131 MMOL/L (ref 136–145)
SOURCE: ABNORMAL
SP GR UR STRIP: 1.01 (ref 1–1.03)
UROBILINOGEN UR STRIP-ACNC: 0.2 EU/DL (ref 0.2–1)
WBC #/AREA URNS AUTO: ABNORMAL /HPF

## 2018-06-01 PROCEDURE — 99214 OFFICE O/P EST MOD 30 MIN: CPT | Performed by: NURSE PRACTITIONER

## 2018-06-01 PROCEDURE — 99213 OFFICE O/P EST LOW 20 MIN: CPT | Performed by: NURSE PRACTITIONER

## 2018-06-01 PROCEDURE — 36415 COLL VENOUS BLD VENIPUNCTURE: CPT | Performed by: NURSE PRACTITIONER

## 2018-06-01 PROCEDURE — 81001 URINALYSIS AUTO W/SCOPE: CPT | Performed by: NURSE PRACTITIONER

## 2018-06-01 PROCEDURE — 80048 BASIC METABOLIC PNL TOTAL CA: CPT | Performed by: NURSE PRACTITIONER

## 2018-06-01 RX ORDER — FUROSEMIDE 40 MG
20 TABLET ORAL DAILY
Qty: 90 TABLET | Refills: 1
Start: 2018-06-01 | End: 2018-01-01

## 2018-06-01 ASSESSMENT — ANXIETY QUESTIONNAIRES
2. NOT BEING ABLE TO STOP OR CONTROL WORRYING: SEVERAL DAYS
6. BECOMING EASILY ANNOYED OR IRRITABLE: NOT AT ALL
3. WORRYING TOO MUCH ABOUT DIFFERENT THINGS: SEVERAL DAYS
5. BEING SO RESTLESS THAT IT IS HARD TO SIT STILL: NOT AT ALL
1. FEELING NERVOUS, ANXIOUS, OR ON EDGE: SEVERAL DAYS
GAD7 TOTAL SCORE: 5
7. FEELING AFRAID AS IF SOMETHING AWFUL MIGHT HAPPEN: SEVERAL DAYS
IF YOU CHECKED OFF ANY PROBLEMS ON THIS QUESTIONNAIRE, HOW DIFFICULT HAVE THESE PROBLEMS MADE IT FOR YOU TO DO YOUR WORK, TAKE CARE OF THINGS AT HOME, OR GET ALONG WITH OTHER PEOPLE: SOMEWHAT DIFFICULT

## 2018-06-01 ASSESSMENT — PATIENT HEALTH QUESTIONNAIRE - PHQ9: 5. POOR APPETITE OR OVEREATING: SEVERAL DAYS

## 2018-06-01 NOTE — PROGRESS NOTES
HPI and Plan: #[id number]  See dictation    Orders Placed This Encounter   Procedures     Basic metabolic panel     N terminal pro BNP outpatient     Follow-Up with Cardiac Advanced Practice Provider       Orders Placed This Encounter   Medications     furosemide (LASIX) 40 MG tablet     Sig: Take 0.5 tablets (20 mg) by mouth daily     Dispense:  90 tablet     Refill:  1       Medications Discontinued During This Encounter   Medication Reason     furosemide (LASIX) 40 MG tablet Reorder         Encounter Diagnoses   Name Primary?     NSTEMI (non-ST elevated myocardial infarction) (H)      Acute on chronic diastolic congestive heart failure (H)      (HFpEF) heart failure with preserved ejection fraction (H)        CURRENT MEDICATIONS:  Current Outpatient Prescriptions   Medication Sig Dispense Refill     albuterol (PROAIR HFA, PROVENTIL HFA, VENTOLIN HFA) 108 (90 BASE) MCG/ACT inhaler Inhale 2 puffs into the lungs every 4 hours as needed for shortness of breath / dyspnea or wheezing 1 Inhaler 5     apixaban ANTICOAGULANT (ELIQUIS) 2.5 MG tablet Take 1 tablet (2.5 mg) by mouth 2 times daily 180 tablet 3     aspirin EC 81 MG EC tablet Take 1 tablet (81 mg) by mouth daily       fluticasone (FLONASE) 50 MCG/ACT spray Spray 1-2 sprays into both nostrils every evening       furosemide (LASIX) 40 MG tablet Take 0.5 tablets (20 mg) by mouth daily 90 tablet 1     GlipiZIDE (GLUCOTROL PO) Take 5 mg by mouth every morning (before breakfast)       hydrocortisone (ANUSOL-HC) 2.5 % cream Place rectally 2 times daily 30 g 0     hydrOXYzine (VISTARIL) 25 MG capsule Take 1 capsule (25 mg) by mouth 2 times daily as needed for itching 180 capsule 1     lisinopril (PRINIVIL/ZESTRIL) 10 MG tablet Take 1 tablet (10 mg) by mouth 2 times daily 180 tablet 1     magnesium hydroxide (MILK OF MAGNESIA) 400 MG/5ML suspension Take 30 mLs by mouth At Bedtime        metoprolol tartrate (LOPRESSOR) 25 MG tablet Take 1 tablet (25 mg) by mouth 2 times  daily 180 tablet 3     Multiple Vitamins-Minerals (PRESERVISION/LUTEIN) CAPS Take 2 capsules by mouth daily        ondansetron (ZOFRAN ODT) 4 MG ODT tab Take 1 tablet (4 mg) by mouth every 6 hours as needed for nausea 30 tablet 1     oxyCODONE-acetaminophen (PERCOCET) 5-325 MG per tablet Take 1 tablet by mouth 2 times daily as needed for severe pain 40 tablet 0     ranitidine (ZANTAC) 150 MG tablet TAKE ONE TABLET BY MOUTH TWO TIMES A  tablet 3     simvastatin (ZOCOR) 10 MG tablet TAKE 1 TABLET (10 MG) BY MOUTH AT BEDTIME 90 tablet 2     tiotropium (SPIRIVA) 18 MCG capsule Inhale 18 mcg into the lungs every evening       ACCU-My Luv My Life My HeartbeatsVIEW test strip USE 1 STRIP BY IN VITRO ROUTE 2 TIMES DAILY OR AS DIRECTED 200 strip 1     Lidocaine (LIDOCARE) 4 % Patch Place 1 patch onto the skin every 24 hours (Patient not taking: Reported on 6/1/2018) 30 patch 1     [DISCONTINUED] furosemide (LASIX) 40 MG tablet Take 1 tablet (40 mg) by mouth daily 90 tablet 1       ALLERGIES     Allergies   Allergen Reactions     Ambien [Zolpidem Tartrate] Visual Disturbance     Hallucinations and fell out of bed at night.     Penicillins Hives     Definity      Caused a syncopal episode.     Sulfa Drugs Itching     Cymbalta Rash     Fluconazole Rash       PAST MEDICAL HISTORY:  Past Medical History:   Diagnosis Date     Anemia      Ankle arthritis      Arthritis      Back pain      Bell's palsy 9/08    left     Breast CA (H) 2004-    left lumpectomy, radiation, -recurrence     CKD (chronic kidney disease)     stage 3 baseline Cr 1.3     Colon polyps     colonoscopy-9/12-next due in 9/13     Congestive heart failure (H)      COPD (chronic obstructive pulmonary disease) (H)      Coronary artery disease      DM (diabetes mellitus) (H)      GERD (gastroesophageal reflux disease)      Hyperlipidemia      Hypertension      Lumbar spinal stenosis     use cane     Nicotine dependence      Obesity      Osteoporosis      Other chronic pain       Pacemaker      Permanent atrial fibrillation (H) 8/2008    S/P AV node ablation and pacemaker 10-25-12       Pleural effusion      Pulmonary hypertension      PVD (peripheral vascular disease) (H)      Rectal stenosis      Tobacco abuse     current     Tricuspid regurgitation        PAST SURGICAL HISTORY:  Past Surgical History:   Procedure Laterality Date     ARTHROSCOPY SHOULDER RT/LT  1999, 2004    Bilateral, right then left     BONE MARROW BIOPSY, BONE SPECIMEN, NEEDLE/TROCAR N/A 8/29/2017    Procedure: BIOPSY BONE MARROW;  BONE MARROW BIOPSY;  Surgeon: Marino Cohen MD;  Location:  GI     C DEXA, BONE DENSITY, AXIAL SKEL       C MAMMOGRAM, SCREENING  1/2009     COLONOSCOPY N/A 10/7/2016    Procedure: COMBINED COLONOSCOPY, SINGLE OR MULTIPLE BIOPSY/POLYPECTOMY BY BIOPSY;  Surgeon: Cassandra Mccord MD;  Location:  GI     ESOPHAGOSCOPY, GASTROSCOPY, DUODENOSCOPY (EGD), COMBINED N/A 8/10/2017    Procedure: COMBINED ESOPHAGOSCOPY, GASTROSCOPY, DUODENOSCOPY (EGD), BIOPSY SINGLE OR MULTIPLE;  gastroscopy;  Surgeon: Helene Bustamante MD;  Location:  GI     H ABLATION AV NODE  10/2012     HC COLONOSCOPY THRU STOMA, DIAGNOSTIC  ? 2005     IMPLANT PACEMAKER  10/2012    [place] [id number], [id number]     JOINT REPLACEMTN, KNEE RT/LT      Joint Replacement knee bilateral     LAPAROSCOPIC CHOLECYSTECTOMY WITH CHOLANGIOGRAMS N/A 12/14/2015    Procedure: LAPAROSCOPIC CHOLECYSTECTOMY WITH CHOLANGIOGRAMS;  Surgeon: Ervin Amos MD;  Location:  OR     LUMPECTOMY BREAST      Left-2004     PHACOEMULSIFICATION CLEAR CORNEA WITH STANDARD INTRAOCULAR LENS IMPLANT Left 7/16/2015    Procedure: PHACOEMULSIFICATION CLEAR CORNEA WITH STANDARD INTRAOCULAR LENS IMPLANT;  Surgeon: Sai Obregon MD;  Location:  EC     PHACOEMULSIFICATION CLEAR CORNEA WITH TORIC INTRAOCULAR LENS IMPLANT Right 5/9/2017    Procedure: PHACOEMULSIFICATION CLEAR CORNEA WITH TORIC INTRAOCULAR LENS IMPLANT;  RIGHT EYE  "PHACOEMULSIFICATION CLEAR CORNEA WITH TORIC INTRAOCULAR LENS IMPLANT ;  Surgeon: Duc Richmond MD;  Location: [facility]       FAMILY HISTORY:  Family History   Problem Relation Age of Onset     Hypertension Mother      DIABETES Mother       at 83 yrs     C.A.D. Father       age 70s     Breast Cancer No family hx of      Colon Cancer No family hx of        SOCIAL HISTORY:  Social History     Social History     Marital status:      Spouse name: N/A     Number of children: N/A     Years of education: N/A     Social History Main Topics     Smoking status: Former Smoker     Packs/day: 0.25     Years: 45.00     Types: Cigarettes     Smokeless tobacco: Never Used     Alcohol use No     Drug use: No     Sexual activity: Not Currently     Partners: Female     Other Topics Concern     Caffeine Concern Yes     2 cups/day     Sleep Concern Yes     Stress Concern Yes     Weight Concern Yes     15+ lb weight loss since hospitalization      Special Diet Yes     bland diet r/t cholecystectomy, low salt      Exercise No     Seat Belt Yes     Social History Narrative    ,no childrenPOA- niece-Enedelia Claros-has plans to quitETOH-1/drink 2-3 /weekExercise-sit down DNR, DNI    Lived in [place] and [place] worked in [employer]       Review of Systems:  Skin:  Negative       Eyes:  Positive for glasses reading  ENT:  Negative      Respiratory:  Positive for shortness of breath     Cardiovascular:  Negative      Gastroenterology: Negative      Genitourinary:  not assessed      Musculoskeletal:  Positive for back pain;arthritis;joint pain pain in right rib area  Neurologic:  Positive for numbness or tingling of feet    Psychiatric:  Positive for sleep disturbances    Heme/Lymph/Imm:  Positive for allergies    Endocrine:  Positive for diabetes      Physical Exam:  Vitals: /77  Pulse 82  Ht 1.676 m (5' 6\")  Wt 70.2 kg (154 lb 11.2 oz)  LMP  (LMP Unknown)  BMI 24.97 kg/m2    Constitutional: "  cooperative, alert and oriented, well developed, well nourished, in no acute distress        Skin:  warm and dry to the touch, no apparent skin lesions or masses noted   pacemaker incision in the left infraclavicular area was well-healed      Head:  normocephalic, no masses or lesions        Eyes:  conjunctivae and lids unremarkable        ENT:  no pallor or cyanosis, dentition good        Neck:  carotid pulses are full and equal bilaterally, JVP normal, no carotid bruit        Respiratory:  clear to auscultation         Cardiac: regular rhythm, normal S1/S2, no S3 or S4, apical impulse not displaced, no murmurs, gallops or rubs                not assessed this visit                                        GI:  abdomen soft        Extremities and Muscular Skeletal:  no edema kyphosis            Neurological:  no gross motor deficits;affect appropriate        Psych:  Alert and Oriented x 3;affect appropriate, oriented to time, person and place;oriented to time, person and place        DHARA Wooten, APRN CNP  [facility]  [id number]  [place] [id number]

## 2018-06-01 NOTE — MR AVS SNAPSHOT
After Visit Summary   6/1/2018    Helene Gibbs    MRN: [id number]           Patient Information     Date Of Birth          1935        Visit Information        Provider Department      6/1/2018 2:30 PM Patricia Sheehan APRN CNP [facility]        Today's Diagnoses     Dysuria    -  1    NO SHOW           Follow-ups after your visit        Your next 10 appointments already scheduled     Jun 20, 2018 12:50 PM CDT   LAB with [facility]   [facility] [place] ([facility])    [address] [place] [id number]   [phone]           Please do not eat 10-12 hours before your appointment if you are coming in fasting for labs on lipids, cholesterol, or glucose (sugar). This does not apply to pregnant women. Water, hot tea and black coffee (with nothing added) are okay. Do not drink other fluids, diet soda or chew gum.            Jun 20, 2018  1:50 PM CDT   UMP EP RETURN with ALLEN Arrington CNP   [facility] ([facility])    [address] [address] [phone] OPT 2            Jul 17, 2018 12:30 PM CDT   Office Visit with Neisha Esparza MD   [facility]    [address] [facility] [address] [phone]           Bring a current list of meds and any records pertaining to this visit. For Physicals, please bring immunization records and any forms needing to be filled out. Please arrive 10 minutes early to complete paperwork.            Jul 31, 2018  1:45 PM CDT   Pacemaker Check with CHASE SAN   [facility] ([facility])    [address] [address] [phone] OPT 2            Aug 06, 2018  3:15 PM CDT   UMP EP RETURN with Jc Juarez MD   [facility] ([facility])    [id number]  [place] [id number] [phone] OPT 2              Future tests that were ordered for you today     Open Future Orders        Priority Expected Expires Ordered    Basic metabolic panel Routine 6/22/2018 6/1/2019 6/1/2018    N terminal pro BNP outpatient Routine 6/22/2018 6/1/2019 6/1/2018    Follow-Up with Cardiac Advanced Practice Provider Routine 6/22/2018 6/1/2019 6/1/2018            Who to contact     If you have questions or need follow up information about today's clinic visit or your schedule please contact [facility] directly at [phone].  Normal or non-critical lab and imaging results will be communicated to you by [employer], letter or phone within 4 business days after the clinic has received the results. If you do not hear from us within 7 days, please contact the clinic through [employer] or phone. If you have a critical or abnormal lab result, we will notify you by phone as soon as possible.  Submit refill requests through [employer] or call your pharmacy and they will forward the refill request to us. Please allow 3 business days for your refill to be completed.          Additional Information About Your Visit        M.dothart Information     [employer] gives you secure access to your electronic health record. If you see a primary care provider, you can also send messages to your care team and make appointments. If you have questions, please call your primary care clinic.  If you do not have a primary care provider, please call [phone] and they will assist you.        Care EveryWhere ID     This is your Care EveryWhere ID. This could be used by other organizations to access your [facility] medical records  [id number]        Your Vitals Were     Last Period                   (LMP Unknown)            Blood Pressure from Last 3 Encounters:   06/01/18 135/77   05/22/18 174/88   05/14/18 98/62    Weight from Last 3 Encounters:   06/01/18 154 lb 11.2 oz (70.2 kg)    05/22/18 160 lb (72.6 kg)   05/14/18 159 lb 3.2 oz (72.2 kg)              Today, you had the following     No orders found for display         Today's Medication Changes          These changes are accurate as of 6/1/18  3:10 PM.  If you have any questions, ask your nurse or doctor.               These medicines have changed or have updated prescriptions.        Dose/Directions    furosemide 40 MG tablet   Commonly known as:  LASIX   This may have changed:  how much to take   Used for:  (HFpEF) heart failure with preserved ejection fraction (H)   Changed by:  Angelica Grady, ALLEN CNP        Dose:  20 mg   Take 0.5 tablets (20 mg) by mouth daily   Quantity:  90 tablet   Refills:  1            Where to get your medicines      Some of these will need a paper prescription and others can be bought over the counter.  Ask your nurse if you have questions.     You don't need a prescription for these medications     furosemide 40 MG tablet                Primary Care Provider Office Phone # Fax #    Neisha Esparza [phone] [address]  [place]                [place] [address]        Goals        Diet    Have 3 meals a day     Notes - Note created  5/23/2018 10:20 AM by Crystal Burciaga RN    Goal Statement: I will try to eat 3 small meals a day   Measure of Success: PCP will continue to weigh the patient at her appointments and monitor for increased weight loss.   Supportive Steps to Achieve: Education on the importance of eating 3 meals day.   Barriers: Loss of appetite stated by the patient.   Strengths: Patient moving to [facility] in July with meals included.   Date to Achieve By: August 1, 2018          General    Healthy Eating (pt-stated)     Pain Management (pt-stated)     Pain Management (pt-stated)     Notes - Note created  5/23/2018 10:17 AM by Crystal Burciaga RN    Goal Statement: I will continue to take my pain medication as prescribed. I will call my physician if the pain gets  worse.   Measure of Success: Patient will stay out of the ED. Patient will call her PCP if needed. RN CC will continue to call the patient to assess how her pain is.   Supportive Steps to Achieve: RN CC will reach out monthly to assess pain. Patient will continue to keep her appointments.   Strengths: Good PCP support. Appointment scheduled with [facility].   Date to Achieve By: August 1, 2018      start date: 4/17/14 I will weigh myself daily and if any weight gain or shortness of breath I will call the clinic. (pt-stated)     Notes - Note created  4/17/2014  3:59 PM by Margareth Pichardo RN    As of today's date 4/17/2014 goal is met at 0 - 25%.   Goal Status:  Active        Equal Access to Services     TONG [facility] CenterMARIMAR : Hadii sukhdeep bundyo Somariia, waaxda luqadaha, qaybta kaalmada adetoriyada, pamela mccullough . So [facility] [phone].    ATENCIÓN: Si habla español, tiene a gupta disposición servicios gratuitos de asistencia lingüística. Llame al [phone].    We comply with applicable federal civil rights laws and [place] laws. We do not discriminate on the basis of race, color, national origin, age, disability, sex, sexual orientation, or gender identity.            Thank you!     Thank you for choosing [facility]  for your care. Our goal is always to provide you with excellent care. Hearing back from our patients is one way we can continue to improve our services. Please take a few minutes to complete the written survey that you may receive in the mail after your visit with us. Thank you!             Your Updated Medication List - Protect others around you: Learn how to safely use, store and throw away your medicines at www.disposemymeds.org.          This list is accurate as of 6/1/18  3:10 PM.  Always use your most recent med list.                   Brand Name Dispense Instructions for use Diagnosis    ACCU-CHEK SMARTVIEW test strip   Generic drug:  blood glucose monitoring      200 strip    USE 1 STRIP BY IN VITRO ROUTE 2 TIMES DAILY OR AS DIRECTED    Type 2 diabetes mellitus with diabetic nephropathy (H)       albuterol 108 (90 Base) MCG/ACT Inhaler    PROAIR HFA/PROVENTIL HFA/VENTOLIN HFA    1 Inhaler    Inhale 2 puffs into the lungs every 4 hours as needed for shortness of breath / dyspnea or wheezing    COPD (chronic obstructive pulmonary disease) (H)       apixaban ANTICOAGULANT 2.5 MG tablet    ELIQUIS    180 tablet    Take 1 tablet (2.5 mg) by mouth 2 times daily    Atrial fibrillation, unspecified type (H)       aspirin 81 MG EC tablet      Take 1 tablet (81 mg) by mouth daily    NSTEMI (non-ST elevated myocardial infarction) (H)       fluticasone 50 MCG/ACT spray    FLONASE     Spray 1-2 sprays into both nostrils every evening        furosemide 40 MG tablet    LASIX    90 tablet    Take 0.5 tablets (20 mg) by mouth daily    (HFpEF) heart failure with preserved ejection fraction (H)       GLUCOTROL PO      Take 5 mg by mouth every morning (before breakfast)        hydrocortisone 2.5 % cream    ANUSOL-HC    30 g    Place rectally 2 times daily    External hemorrhoids       hydrOXYzine 25 MG capsule    VISTARIL    180 capsule    Take 1 capsule (25 mg) by mouth 2 times daily as needed for itching    Itching       Lidocaine 4 % Patch    LIDOCARE    30 patch    Place 1 patch onto the skin every 24 hours    Chronic back pain, unspecified back location, unspecified back pain laterality       lisinopril 10 MG tablet    PRINIVIL/ZESTRIL    180 tablet    Take 1 tablet (10 mg) by mouth 2 times daily    NSTEMI (non-ST elevated myocardial infarction) (H)       magnesium hydroxide 400 MG/5ML suspension    MILK OF MAGNESIA     Take 30 mLs by mouth At Bedtime        metoprolol tartrate 25 MG tablet    LOPRESSOR    180 tablet    Take 1 tablet (25 mg) by mouth 2 times daily    NSTEMI (non-ST elevated myocardial infarction) (H)       ondansetron 4 MG ODT tab    ZOFRAN ODT    30 tablet    Take 1  tablet (4 mg) by mouth every 6 hours as needed for nausea    Nausea       oxyCODONE-acetaminophen 5-325 MG per tablet    PERCOCET    40 tablet    Take 1 tablet by mouth 2 times daily as needed for severe pain    Radicular pain       PRESERVISION/LUTEIN Caps      Take 2 capsules by mouth daily        ranitidine 150 MG tablet    ZANTAC    180 tablet    TAKE ONE TABLET BY MOUTH TWO TIMES A DAY    Dyspepsia       simvastatin 10 MG tablet    ZOCOR    90 tablet    TAKE 1 TABLET (10 MG) BY MOUTH AT BEDTIME    Type 2 diabetes mellitus with diabetic nephropathy, without long-term current use of insulin (H)       tiotropium 18 MCG capsule    SPIRIVA     Inhale 18 mcg into the lungs every evening

## 2018-06-01 NOTE — LETTER
6/1/2018    Neisha Esparza MD  [address]  [facility] [address]    RE: Helene Gibbs       Dear Colleague,    I had the pleasure of seeing Helene Gibbs in the [facility].    HPI and Plan: #[id number]  See dictation    Orders Placed This Encounter   Procedures     Basic metabolic panel     N terminal pro BNP outpatient     Follow-Up with Cardiac Advanced Practice Provider       Orders Placed This Encounter   Medications     furosemide (LASIX) 40 MG tablet     Sig: Take 0.5 tablets (20 mg) by mouth daily     Dispense:  90 tablet     Refill:  1       Medications Discontinued During This Encounter   Medication Reason     furosemide (LASIX) 40 MG tablet Reorder         Encounter Diagnoses   Name Primary?     NSTEMI (non-ST elevated myocardial infarction) (H)      Acute on chronic diastolic congestive heart failure (H)      (HFpEF) heart failure with preserved ejection fraction (H)        CURRENT MEDICATIONS:  Current Outpatient Prescriptions   Medication Sig Dispense Refill     albuterol (PROAIR HFA, PROVENTIL HFA, VENTOLIN HFA) 108 (90 BASE) MCG/ACT inhaler Inhale 2 puffs into the lungs every 4 hours as needed for shortness of breath / dyspnea or wheezing 1 Inhaler 5     apixaban ANTICOAGULANT (ELIQUIS) 2.5 MG tablet Take 1 tablet (2.5 mg) by mouth 2 times daily 180 tablet 3     aspirin EC 81 MG EC tablet Take 1 tablet (81 mg) by mouth daily       fluticasone (FLONASE) 50 MCG/ACT spray Spray 1-2 sprays into both nostrils every evening       furosemide (LASIX) 40 MG tablet Take 0.5 tablets (20 mg) by mouth daily 90 tablet 1     GlipiZIDE (GLUCOTROL PO) Take 5 mg by mouth every morning (before breakfast)       hydrocortisone (ANUSOL-HC) 2.5 % cream Place rectally 2 times daily 30 g 0     hydrOXYzine (VISTARIL) 25 MG capsule Take 1 capsule (25 mg) by mouth 2 times daily as needed for itching 180 capsule 1     lisinopril (PRINIVIL/ZESTRIL) 10 MG tablet Take 1  tablet (10 mg) by mouth 2 times daily 180 tablet 1     magnesium hydroxide (MILK OF MAGNESIA) 400 MG/5ML suspension Take 30 mLs by mouth At Bedtime        metoprolol tartrate (LOPRESSOR) 25 MG tablet Take 1 tablet (25 mg) by mouth 2 times daily 180 tablet 3     Multiple Vitamins-Minerals (PRESERVISION/LUTEIN) CAPS Take 2 capsules by mouth daily        ondansetron (ZOFRAN ODT) 4 MG ODT tab Take 1 tablet (4 mg) by mouth every 6 hours as needed for nausea 30 tablet 1     oxyCODONE-acetaminophen (PERCOCET) 5-325 MG per tablet Take 1 tablet by mouth 2 times daily as needed for severe pain 40 tablet 0     ranitidine (ZANTAC) 150 MG tablet TAKE ONE TABLET BY MOUTH TWO TIMES A  tablet 3     simvastatin (ZOCOR) 10 MG tablet TAKE 1 TABLET (10 MG) BY MOUTH AT BEDTIME 90 tablet 2     tiotropium (SPIRIVA) 18 MCG capsule Inhale 18 mcg into the lungs every evening       ACCU-CHEK SMARTVIEW test strip USE 1 STRIP BY IN VITRO ROUTE 2 TIMES DAILY OR AS DIRECTED 200 strip 1     Lidocaine (LIDOCARE) 4 % Patch Place 1 patch onto the skin every 24 hours (Patient not taking: Reported on 6/1/2018) 30 patch 1     [DISCONTINUED] furosemide (LASIX) 40 MG tablet Take 1 tablet (40 mg) by mouth daily 90 tablet 1       ALLERGIES     Allergies   Allergen Reactions     Ambien [Zolpidem Tartrate] Visual Disturbance     Hallucinations and fell out of bed at night.     Penicillins Hives     Definity      Caused a syncopal episode.     Sulfa Drugs Itching     Cymbalta Rash     Fluconazole Rash       PAST MEDICAL HISTORY:  Past Medical History:   Diagnosis Date     Anemia      Ankle arthritis      Arthritis      Back pain      Bell's palsy 9/08    left     Breast CA (H) 2004-    left lumpectomy, radiation, -recurrence     CKD (chronic kidney disease)     stage 3 baseline Cr 1.3     Colon polyps     colonoscopy-9/12-next due in 9/13     Congestive heart failure (H)      COPD (chronic obstructive pulmonary disease) (H)      Coronary artery disease       DM (diabetes mellitus) (H)      GERD (gastroesophageal reflux disease)      Hyperlipidemia      Hypertension      Lumbar spinal stenosis     use cane     Nicotine dependence      Obesity      Osteoporosis      Other chronic pain      Pacemaker      Permanent atrial fibrillation (H) 8/2008    S/P AV node ablation and pacemaker 10-25-12       Pleural effusion      Pulmonary hypertension      PVD (peripheral vascular disease) (H)      Rectal stenosis      Tobacco abuse     current     Tricuspid regurgitation        PAST SURGICAL HISTORY:  Past Surgical History:   Procedure Laterality Date     ARTHROSCOPY SHOULDER RT/LT  1999, 2004    Bilateral, right then left     BONE MARROW BIOPSY, BONE SPECIMEN, NEEDLE/TROCAR N/A 8/29/2017    Procedure: BIOPSY BONE MARROW;  BONE MARROW BIOPSY;  Surgeon: Marino Cohen MD;  Location:  GI     C DEXA, BONE DENSITY, AXIAL SKEL       C MAMMOGRAM, SCREENING  1/2009     COLONOSCOPY N/A 10/7/2016    Procedure: COMBINED COLONOSCOPY, SINGLE OR MULTIPLE BIOPSY/POLYPECTOMY BY BIOPSY;  Surgeon: Cassandra Mccord MD;  Location:  GI     ESOPHAGOSCOPY, GASTROSCOPY, DUODENOSCOPY (EGD), COMBINED N/A 8/10/2017    Procedure: COMBINED ESOPHAGOSCOPY, GASTROSCOPY, DUODENOSCOPY (EGD), BIOPSY SINGLE OR MULTIPLE;  gastroscopy;  Surgeon: Helene Bustamante MD;  Location:  GI     H ABLATION AV NODE  10/2012     HC COLONOSCOPY THRU STOMA, DIAGNOSTIC  ? 2005     IMPLANT PACEMAKER  10/2012    [place] [id number], [id number]     JOINT REPLACEMTN, KNEE RT/LT      Joint Replacement knee bilateral     LAPAROSCOPIC CHOLECYSTECTOMY WITH CHOLANGIOGRAMS N/A 12/14/2015    Procedure: LAPAROSCOPIC CHOLECYSTECTOMY WITH CHOLANGIOGRAMS;  Surgeon: Ervin Amos MD;  Location:  OR     LUMPECTOMY BREAST      Left-2004     PHACOEMULSIFICATION CLEAR CORNEA WITH STANDARD INTRAOCULAR LENS IMPLANT Left 7/16/2015    Procedure: PHACOEMULSIFICATION CLEAR CORNEA WITH STANDARD INTRAOCULAR LENS IMPLANT;  Surgeon:  Sai Obregon MD;  Location: [facility]     PHACOEMULSIFICATION CLEAR CORNEA WITH TORIC INTRAOCULAR LENS IMPLANT Right 2017    Procedure: PHACOEMULSIFICATION CLEAR CORNEA WITH TORIC INTRAOCULAR LENS IMPLANT;  RIGHT EYE PHACOEMULSIFICATION CLEAR CORNEA WITH TORIC INTRAOCULAR LENS IMPLANT ;  Surgeon: Duc Richmond MD;  Location: [facility]       FAMILY HISTORY:  Family History   Problem Relation Age of Onset     Hypertension Mother      DIABETES Mother       at 83 yrs     C.A.D. Father       age 70s     Breast Cancer No family hx of      Colon Cancer No family hx of        SOCIAL HISTORY:  Social History     Social History     Marital status:      Spouse name: N/A     Number of children: N/A     Years of education: N/A     Social History Main Topics     Smoking status: Former Smoker     Packs/day: 0.25     Years: 45.00     Types: Cigarettes     Smokeless tobacco: Never Used     Alcohol use No     Drug use: No     Sexual activity: Not Currently     Partners: Female     Other Topics Concern     Caffeine Concern Yes     2 cups/day     Sleep Concern Yes     Stress Concern Yes     Weight Concern Yes     15+ lb weight loss since hospitalization      Special Diet Yes     bland diet r/t cholecystectomy, low salt      Exercise No     Seat Belt Yes     Social History Narrative    ,no childrenPOA- niece-Enedelia Claros-has plans to quitETOH-1/drink 2-3 /weekExercise-sit down DNR, DNI    Lived in [place] and [place] worked in [employer], [employer] department       Review of Systems:  Skin:  Negative       Eyes:  Positive for glasses reading  ENT:  Negative      Respiratory:  Positive for shortness of breath     Cardiovascular:  Negative      Gastroenterology: Negative      Genitourinary:  not assessed      Musculoskeletal:  Positive for back pain;arthritis;joint pain pain in right rib area  Neurologic:  Positive for numbness or tingling of feet    Psychiatric:  Positive for sleep disturbances   "  Heme/Lymph/Imm:  Positive for allergies    Endocrine:  Positive for diabetes      Physical Exam:  Vitals: /77  Pulse 82  Ht 1.676 m (5' 6\")  Wt 70.2 kg (154 lb 11.2 oz)  LMP  (LMP Unknown)  BMI 24.97 kg/m2    Constitutional:  cooperative, alert and oriented, well developed, well nourished, in no acute distress        Skin:  warm and dry to the touch, no apparent skin lesions or masses noted   pacemaker incision in the left infraclavicular area was well-healed      Head:  normocephalic, no masses or lesions        Eyes:  conjunctivae and lids unremarkable        ENT:  no pallor or cyanosis, dentition good        Neck:  carotid pulses are full and equal bilaterally, JVP normal, no carotid bruit        Respiratory:  clear to auscultation         Cardiac: regular rhythm, normal S1/S2, no S3 or S4, apical impulse not displaced, no murmurs, gallops or rubs                not assessed this visit                                        GI:  abdomen soft        Extremities and Muscular Skeletal:  no edema kyphosis            Neurological:  no gross motor deficits;affect appropriate        Psych:  Alert and Oriented x 3;affect appropriate, oriented to time, person and place;oriented to time, person and place        CC  ALLEN Esqueda CNP  [facility]  [address]  [place] [address]                Thank you for allowing me to participate in the care of your patient.      Sincerely,     Angelica Grady, NP, APRN CNP     [facility]    cc:   ALLEN Esqueda CNP  [facility]  [address]  [place] [address]        "

## 2018-06-01 NOTE — LETTER
6/1/2018      Neisha Esparza MD  [address] [facility] [address]  [facility] [address]      RE: Helene Gaticaon       Dear Colleague,    I had the pleasure of seeing Helene Gibbs in the [facility].    Service Date: 06/01/2018      HISTORY OF PRESENT ILLNESS:  Ms. Gibbs is a delightful 83-year-old woman well known to me here at the clinic.  She is an established patient of Dr. Juarez.  She has a history of permanent atrial fibrillation with previous AV alexandra ablation and pacemaker implantation.  She also has a history of HFpEF and hypertension.      Unfortunately, she was admitted to [facility] earlier this month with a heart failure exacerbation and non-STEMI.  She was admitted on 05/05 and discharged on 05/08.  The patient knew her weight had been climbing; however, she felt that it was not significantly elevated.  Upon presentation to the hospital, she states that she had significant shortness of breath.  She also reported chest pain.  The patient states that she does not necessarily remember having chest pain per se; however, is very frustrated that she was never told during her hospital stay that she had a non-ST elevation MI.  The patient, per the report, appeared with atypical epigastric/chest discomfort.  She was given oral nitro.  Her troponin bumped to a peak of 0.79.  Dr. Alvarez saw her in consultation.  Metoprolol was added to her regimen.  Her Lasix was increased to 40 mg daily for her HFpEF.  She also has a history of tobacco use, and smoking cessation was encouraged.      I had just seen the patient in 03/2018, which she was doing quite well.  I reviewed her echo at that time which showed an EF of 50%-55% with moderate TR and mild pulmonary hypertension.  Again, she was diuresed with IV Lasix and transitioned to 40 mg of oral Lasix.  She lost 5.37 liters.  She is here today for a post-hospital followup.      She is on Eliquis at 2.5 mg b.i.d. and metoprolol as  outlined above.        Device interrogation on 05/01 was a 90-day Teletrace that showed chronic atrial fibrillation with appropriate V pacing.  Her annual threshold is scheduled for August.      At today's visit, she is doing well.  Her weight is down to 154 pounds.  On 04/16/2018, her weight was noted at 175.  Her lymphedema has improved significantly.  She admits that she has been too nervous to eat and was taken back when she heard that she had a small heart attack.      BMP today shows a bump of her BUN and creatinine to 32/1.49 with a GFR of 34.  Potassium 5.1.  Serum sodium of 131.  The patient also has a history of anemia and is followed by Dr. Cali.  Hemoglobin on 05/22 was 15.7.  All other review of systems and past medical history are noted below.      ASSESSMENT AND PLAN:  Ms. Gibbs is a delightful 83-year-old woman here today for followup.   1.  Status post hospitalization for acute-on-chronic exacerbation of HFpEF, EF 50%-55%.  I have asked that she decrease her Lasix to 20 mg daily.  She will continue to weigh daily and contact us for a weight gain of 3 pounds in a day or 5 pounds in a week.  She needs to also needs to modify her sodium in her diet.  At her next visit if she is stabilized and feeling more comfortable, I will continue to follow her closely.  I have also offered to have her follow up in our [facility].   The patient is going to be moving in July to assisted living.  We can discuss it further at the next visit.   2.  Atrial fibrillation/flutter with AV alexandra ablation and pacemaker in the past.  She is on Eliquis and metoprolol.  Her device is functioning normally.     3.  Non-STEMI.  Again, peak troponin was 0.079.  Cardiac risk factors for coronary artery disease include age, hypertension, diabetes mellitus and tobacco history.  She was seen in consultation by Dr. Alvarez.  A stress test was completed which showed a small area of apical ischemia.  No wall motion abnormalities were  noted.  EF was 61%.  When compared to the previous stress test from 2012, the apical ischemia appears to be new.  She still is asymptomatic without complaints of chest discomfort.  Due to her cardiac risk factors, it would not be surprising that she does have some mild coronary disease.  If she were to become symptomatic, I would recommend a further ischemic workup.   3.  Hypertension.  She is normotensive today at 135/77.      The patient has had some right upper quadrant discomfort.  She has had a CT scan and nothing has been noted.  She also has chronic midline low back pain and sciatica.  She is being followed now by [facility].      As always, we appreciate being involved in the care of this delightful patient.  I would like to see her back in 2-3 weeks with a repeat BMP and NT-proBNP.  We will reassess her if she is having chest pain at that time.  She will contact me in the interim if there are concerns.         VANESA SALCIDO NP             D: 2018   T: 2018   MT: BLANQUITA      Name:     PATRICIA BOBO   MRN:      [id number]        Account:      [id number]   :      1935           Service Date: 2018      Document: [id number]         Outpatient Encounter Prescriptions as of 2018   Medication Sig Dispense Refill     albuterol (PROAIR HFA, PROVENTIL HFA, VENTOLIN HFA) 108 (90 BASE) MCG/ACT inhaler Inhale 2 puffs into the lungs every 4 hours as needed for shortness of breath / dyspnea or wheezing 1 Inhaler 5     apixaban ANTICOAGULANT (ELIQUIS) 2.5 MG tablet Take 1 tablet (2.5 mg) by mouth 2 times daily 180 tablet 3     aspirin EC 81 MG EC tablet Take 1 tablet (81 mg) by mouth daily       fluticasone (FLONASE) 50 MCG/ACT spray Spray 1-2 sprays into both nostrils every evening       furosemide (LASIX) 40 MG tablet Take 0.5 tablets (20 mg) by mouth daily 90 tablet 1     GlipiZIDE (GLUCOTROL PO) Take 5 mg by mouth every morning (before breakfast)       hydrocortisone (ANUSOL-HC) 2.5 % cream  Place rectally 2 times daily 30 g 0     hydrOXYzine (VISTARIL) 25 MG capsule Take 1 capsule (25 mg) by mouth 2 times daily as needed for itching 180 capsule 1     lisinopril (PRINIVIL/ZESTRIL) 10 MG tablet Take 1 tablet (10 mg) by mouth 2 times daily 180 tablet 1     magnesium hydroxide (MILK OF MAGNESIA) 400 MG/5ML suspension Take 30 mLs by mouth At Bedtime        metoprolol tartrate (LOPRESSOR) 25 MG tablet Take 1 tablet (25 mg) by mouth 2 times daily 180 tablet 3     Multiple Vitamins-Minerals (PRESERVISION/LUTEIN) CAPS Take 2 capsules by mouth daily        ondansetron (ZOFRAN ODT) 4 MG ODT tab Take 1 tablet (4 mg) by mouth every 6 hours as needed for nausea 30 tablet 1     oxyCODONE-acetaminophen (PERCOCET) 5-325 MG per tablet Take 1 tablet by mouth 2 times daily as needed for severe pain 40 tablet 0     ranitidine (ZANTAC) 150 MG tablet TAKE ONE TABLET BY MOUTH TWO TIMES A  tablet 3     simvastatin (ZOCOR) 10 MG tablet TAKE 1 TABLET (10 MG) BY MOUTH AT BEDTIME 90 tablet 2     tiotropium (SPIRIVA) 18 MCG capsule Inhale 18 mcg into the lungs every evening       ACCU-CHEK SMARTVIEW test strip USE 1 STRIP BY IN VITRO ROUTE 2 TIMES DAILY OR AS DIRECTED 200 strip 1     Lidocaine (LIDOCARE) 4 % Patch Place 1 patch onto the skin every 24 hours 30 patch 1     [DISCONTINUED] furosemide (LASIX) 40 MG tablet Take 1 tablet (40 mg) by mouth daily 90 tablet 1     No facility-administered encounter medications on file as of 6/1/2018.        Again, thank you for allowing me to participate in the care of your patient.      Sincerely,    Angelica Grady NP, APRN [facility]

## 2018-06-01 NOTE — PROGRESS NOTES
SUBJECTIVE:   Helene Gbibs is a 83 year old female who presents to clinic today for the following health issues:      URINARY TRACT SYMPTOMS      Duration: 5 days    Description   frequency, urgency and back pain not really new just seems worse    Intensity:  moderate    Accompanying signs and symptoms:  Fever/chills: no   Flank pain YES- right side but does spinal stenosis and compression fx  Nausea and vomiting: YES- loss of appetite  Vaginal symptoms: none  Abdominal/Pelvic Pain: no   Right sided thoracic back pain for which she has just had a steroid injection and has a second one scheduled at [facility] in 2 weeks.  She thinks the pressure in her abdomen for which she has been worked up and CT'd is related to the back problem .  Does have diabetes        History- has also just come from her cardiology appt;     History of frequent UTI's: no   History of kidney stones: no   Sexually Active: no   Possibility of pregnancy: No    Therapies tried and outcome: tried monistat   Outcome: Helped a little but did not cure it      Problem list and histories reviewed & adjusted, as indicated.  Additional history: as documented    Patient Active Problem List   Diagnosis     Type 2 diabetes mellitus with diabetic nephropathy (H)     Anemia     CKD (chronic kidney disease) stage 3, GFR 30-59 ml/min     Chronic low back pain     Pleural effusion, left     Adjustment reaction with anxiety and depression     c diff colitis 11-12-12     Pulmonary hypertension     Melanosis of colon     Diplopia     COPD (chronic obstructive pulmonary disease) (H)     Elevated TSH     CHF (congestive heart failure) (H)     Cardiac pacemaker in situ     Neck pain     Intractable back pain     Health Care Home     Mild cognitive impairment SLUMS = 22/ 30  CPT = 5.0/ 5.5 7-2014     Hypertension goal BP (blood pressure) < 140/90     Depression with anxiety     Tobacco abuse: [age]y/o on 8-12-14 @ 1ppd=50 pk yr hx      Stasis dermatitis     Thoracic  disc herniation     Obesity     Hyperlipidemia     Nausea and vomiting     Cholelithiasis with acute on chronic cholecystitis     Slow transit constipation     Gastroesophageal reflux disease without esophagitis     Permanent atrial fibrillation (H)     Basal cell carcinoma of skin     Chronic pain syndrome     Tubular adenoma     ACP (advance care planning)     Gastrointestinal hemorrhage, unspecified gastrointestinal hemorrhage type     Iron deficiency     Adverse effect of iron     History of bone marrow biopsy     Herpes zoster without complication     Controlled substance agreement signed     Other chronic pain     Hematoma of groin     Groin hematoma, initial encounter     Pulmonary nodule     Chest pressure     NSTEMI (non-ST elevated myocardial infarction) (H)     (HFpEF) heart failure with preserved ejection fraction (H)     Past Surgical History:   Procedure Laterality Date     ARTHROSCOPY SHOULDER RT/LT  1999, 2004    Bilateral, right then left     BONE MARROW BIOPSY, BONE SPECIMEN, NEEDLE/TROCAR N/A 8/29/2017    Procedure: BIOPSY BONE MARROW;  BONE MARROW BIOPSY;  Surgeon: Marino Cohen MD;  Location:  GI     C DEXA, BONE DENSITY, AXIAL SKEL       C MAMMOGRAM, SCREENING  1/2009     COLONOSCOPY N/A 10/7/2016    Procedure: COMBINED COLONOSCOPY, SINGLE OR MULTIPLE BIOPSY/POLYPECTOMY BY BIOPSY;  Surgeon: Cassandra Mccord MD;  Location:  GI     ESOPHAGOSCOPY, GASTROSCOPY, DUODENOSCOPY (EGD), COMBINED N/A 8/10/2017    Procedure: COMBINED ESOPHAGOSCOPY, GASTROSCOPY, DUODENOSCOPY (EGD), BIOPSY SINGLE OR MULTIPLE;  gastroscopy;  Surgeon: Helene Bustamante MD;  Location:  GI     H ABLATION AV NODE  10/2012     HC COLONOSCOPY THRU STOMA, DIAGNOSTIC  ? 2005     IMPLANT PACEMAKER  10/2012    [place] [id number], [id number]     JOINT REPLACEMTN, KNEE RT/LT      Joint Replacement knee bilateral     LAPAROSCOPIC CHOLECYSTECTOMY WITH CHOLANGIOGRAMS N/A 12/14/2015    Procedure: LAPAROSCOPIC CHOLECYSTECTOMY  WITH CHOLANGIOGRAMS;  Surgeon: Ervin Amos MD;  Location:  OR     LUMPECTOMY BREAST      Left-     PHACOEMULSIFICATION CLEAR CORNEA WITH STANDARD INTRAOCULAR LENS IMPLANT Left 2015    Procedure: PHACOEMULSIFICATION CLEAR CORNEA WITH STANDARD INTRAOCULAR LENS IMPLANT;  Surgeon: Sai Obregon MD;  Location: [facility]     PHACOEMULSIFICATION CLEAR CORNEA WITH TORIC INTRAOCULAR LENS IMPLANT Right 2017    Procedure: PHACOEMULSIFICATION CLEAR CORNEA WITH TORIC INTRAOCULAR LENS IMPLANT;  RIGHT EYE PHACOEMULSIFICATION CLEAR CORNEA WITH TORIC INTRAOCULAR LENS IMPLANT ;  Surgeon: Duc Richmond MD;  Location: [facility]       Social History   Substance Use Topics     Smoking status: Former Smoker     Packs/day: 0.25     Years: 45.00     Types: Cigarettes     Smokeless tobacco: Never Used     Alcohol use No     Family History   Problem Relation Age of Onset     Hypertension Mother      DIABETES Mother       at 83 yrs     C.A.D. Father       age 70s     Breast Cancer No family hx of      Colon Cancer No family hx of          Current Outpatient Prescriptions   Medication Sig Dispense Refill     ACCU-CHEK SMARTVIEW test strip USE 1 STRIP BY IN VITRO ROUTE 2 TIMES DAILY OR AS DIRECTED 200 strip 1     albuterol (PROAIR HFA, PROVENTIL HFA, VENTOLIN HFA) 108 (90 BASE) MCG/ACT inhaler Inhale 2 puffs into the lungs every 4 hours as needed for shortness of breath / dyspnea or wheezing 1 Inhaler 5     apixaban ANTICOAGULANT (ELIQUIS) 2.5 MG tablet Take 1 tablet (2.5 mg) by mouth 2 times daily 180 tablet 3     aspirin EC 81 MG EC tablet Take 1 tablet (81 mg) by mouth daily       fluticasone (FLONASE) 50 MCG/ACT spray Spray 1-2 sprays into both nostrils every evening       furosemide (LASIX) 40 MG tablet Take 0.5 tablets (20 mg) by mouth daily 90 tablet 1     GlipiZIDE (GLUCOTROL PO) Take 5 mg by mouth every morning (before breakfast)       hydrocortisone (ANUSOL-HC) 2.5 % cream Place rectally 2 times  "daily 30 g 0     hydrOXYzine (VISTARIL) 25 MG capsule Take 1 capsule (25 mg) by mouth 2 times daily as needed for itching 180 capsule 1     Lidocaine (LIDOCARE) 4 % Patch Place 1 patch onto the skin every 24 hours 30 patch 1     lisinopril (PRINIVIL/ZESTRIL) 10 MG tablet Take 1 tablet (10 mg) by mouth 2 times daily 180 tablet 1     magnesium hydroxide (MILK OF MAGNESIA) 400 MG/5ML suspension Take 30 mLs by mouth At Bedtime        metoprolol tartrate (LOPRESSOR) 25 MG tablet Take 1 tablet (25 mg) by mouth 2 times daily 180 tablet 3     Multiple Vitamins-Minerals (PRESERVISION/LUTEIN) CAPS Take 2 capsules by mouth daily        ondansetron (ZOFRAN ODT) 4 MG ODT tab Take 1 tablet (4 mg) by mouth every 6 hours as needed for nausea 30 tablet 1     oxyCODONE-acetaminophen (PERCOCET) 5-325 MG per tablet Take 1 tablet by mouth 2 times daily as needed for severe pain 40 tablet 0     ranitidine (ZANTAC) 150 MG tablet TAKE ONE TABLET BY MOUTH TWO TIMES A  tablet 3     simvastatin (ZOCOR) 10 MG tablet TAKE 1 TABLET (10 MG) BY MOUTH AT BEDTIME 90 tablet 2     tiotropium (SPIRIVA) 18 MCG capsule Inhale 18 mcg into the lungs every evening       [DISCONTINUED] furosemide (LASIX) 40 MG tablet Take 1 tablet (40 mg) by mouth daily 90 tablet 1     Allergies   Allergen Reactions     Ambien [Zolpidem Tartrate] Visual Disturbance     Hallucinations and fell out of bed at night.     Penicillins Hives     Definity      Caused a syncopal episode.     Sulfa Drugs Itching     Cymbalta Rash     Fluconazole Rash       Reviewed and updated as needed this visit by clinical staff       Reviewed and updated as needed this visit by Provider         ROS:  Constitutional, HEENT, cardiovascular, pulmonary, gi and gu systems are negative, except as otherwise noted.    OBJECTIVE:     /87 (BP Location: Right arm, Patient Position: Chair, Cuff Size: Adult Regular)  Pulse 73  Temp 96.9  F (36.1  C) (Oral)  Ht 5' 6\" (1.676 m)  Wt 154 lb (69.9 " kg)  LMP  (LMP Unknown)  SpO2 97%  BMI 24.86 kg/m2  Body mass index is 24.86 kg/(m^2).  GENERAL: healthy, alert and no distress  EYES: Eyes grossly normal to inspection, PERRL and conjunctivae and sclerae normal  RESP: lungs clear to auscultation - no rales, rhonchi or wheezes  CV: regular rate and rhythm, normal S1 S2, no S3 or S4, no murmur, click or rub, no peripheral edema and peripheral pulses strong  ABDOMEN: soft, nontender, no hepatosplenomegaly, no masses and bowel sounds normal  BACK: no CVAT      Diagnostic Test Results:  COMPARISON: CT chest, abdomen, and pelvis 4/6/2018.     FINDINGS:  Chest: No evidence for pulmonary embolism. Limited opacification of  the thoracic aorta limits assessment. Thoracic aortic and coronary  artery calcifications identified. Multichamber cardiomegaly. Irregular  1.2 cm nodule is overall stable in size at the right upper lobe series  6 image 44. Stable nodule lateral right lower lobe is 0.2 cm image 87.  Prominent focal area of consolidation or rounded atelectasis posterior  left lower lobe image 91 appears stable. No new airspace disease. No  effusions. No adenopathy identified.     Abdomen/pelvis: Liver, spleen, adrenals, pancreas, and kidneys do not  show any acute abnormalities. Renal cortical thinning and multiple  renal cysts again identified. Mild left adrenal nodular thickening  appears stable. Diffuse vascular calcifications.     No bowel obstruction. No acute inflammatory change of the bowel  identified. A few small bowel loops are mildly distended.     Multilevel vertebroplasty change is again identified, stable at the  thoracolumbar spine.    Results for orders placed or performed in visit on 06/01/18 (from the past 24 hour(s))   UA reflex to Microscopic and Culture   Result Value Ref Range    Color Urine Yellow     Appearance Urine Slightly Cloudy     Glucose Urine Negative NEG^Negative mg/dL    Bilirubin Urine Negative NEG^Negative    Ketones Urine Negative  NEG^Negative mg/dL    Specific Gravity Urine 1.015 1.003 - 1.035    Blood Urine Negative NEG^Negative    pH Urine 7.0 5.0 - 7.0 pH    Protein Albumin Urine 30 (A) NEG^Negative mg/dL    Urobilinogen Urine 0.2 0.2 - 1.0 EU/dL    Nitrite Urine Negative NEG^Negative    Leukocyte Esterase Urine Negative NEG^Negative    Source Midstream Urine    Urine Microscopic   Result Value Ref Range    WBC Urine 0 - 5 OTO5^0 - 5 /HPF    RBC Urine O - 2 OTO2^O - 2 /HPF    Squamous Epithelial /LPF Urine Many (A) FEW^Few /LPF    Bacteria Urine Many (A) NEG^Negative /HPF     *Note: Due to a large number of results and/or encounters for the requested time period, some results have not been displayed. A complete set of results can be found in Results Review.     A1C in March 6.8  Glucose today 161 non fasting  Sodium 131  ASSESSMENT/PLAN:       ICD-10-CM    1. Dysuria R30.0 UA reflex to Microscopic and Culture     Urine Microscopic   reassured that she does not have UTI  She is under the impression from her card appt that because she has low sodium she needs to drink more water  I was not able to convince her of the need to reduce water consumption but drink other fluids       ALLEN [facility]

## 2018-06-01 NOTE — MR AVS SNAPSHOT
After Visit Summary   6/1/2018    Helene Gibbs    MRN: [id number]           Patient Information     Date Of Birth          1935        Visit Information        Provider Department      6/1/2018 3:15 PM Patricia Sheehan APRN [facility] (formerly [facility])        Today's Diagnoses     Dysuria    -  1       Follow-ups after your visit        Follow-up notes from your care team     Return if symptoms worsen or fail to improve.      Your next 10 appointments already scheduled     Jun 20, 2018 12:50 PM CDT   LAB with [facility]   [facility] ([facility])    [address] [place] [address]   [phone]           Please do not eat 10-12 hours before your appointment if you are coming in fasting for labs on lipids, cholesterol, or glucose (sugar). This does not apply to pregnant women. Water, hot tea and black coffee (with nothing added) are okay. Do not drink other fluids, diet soda or chew gum.            Jun 20, 2018  1:50 PM CDT   [facility] EP RETURN with ALLEN Arrington [facility] ([facility])    [address] [place] [address]   [phone] OPT 2            Jul 17, 2018 12:30 PM CDT   Office Visit with Neisha Esparza MD   [facility] ([facility])    [address] [address] [phone]           Bring a current list of meds and any records pertaining to this visit. For Physicals, please bring immunization records and any forms needing to be filled out. Please arrive 10 minutes early to complete paperwork.            Jul 31, 2018  1:45 PM CDT   Pacemaker Check with CHASE SAN   [facility] ([facility])    [address] [place] [id number] [phone] OPT 2            Aug 06, 2018  3:15 PM CDT   [facility] EP RETURN with Jc Juarez MD  "  [facility] ([facility])    [address]  [place] [address] [phone] OPT 2              Future tests that were ordered for you today     Open Future Orders        Priority Expected Expires Ordered    Basic metabolic panel Routine 6/22/2018 6/1/2019 6/1/2018    N terminal pro BNP outpatient Routine 6/22/2018 6/1/2019 6/1/2018    Follow-Up with Cardiac Advanced Practice Provider Routine 6/22/2018 6/1/2019 6/1/2018            Who to contact     If you have questions or need follow up information about today's clinic visit or your schedule please contact [facility] directly at [phone].  Normal or non-critical lab and imaging results will be communicated to you by [employer], letter or phone within 4 business days after the clinic has received the results. If you do not hear from us within 7 days, please contact the clinic through [employer] or phone. If you have a critical or abnormal lab result, we will notify you by phone as soon as possible.  Submit refill requests through [employer] or call your pharmacy and they will forward the refill request to us. Please allow 3 business days for your refill to be completed.          Additional Information About Your Visit        [employer] Information     [employer] gives you secure access to your electronic health record. If you see a primary care provider, you can also send messages to your care team and make appointments. If you have questions, please call your primary care clinic.  If you do not have a primary care provider, please call [phone] and they will assist you.        Care EveryWhere ID     This is your Care EveryWhere ID. This could be used by other organizations to access your [place] medical records  [id number]        Your Vitals Were     Pulse Temperature Height Last Period Pulse Oximetry BMI (Body Mass Index)    73 96.9  F (36.1  C) (Oral) 5' 6\" (1.676 m) (LMP Unknown) 97% " 24.86 kg/m2       Blood Pressure from Last 3 Encounters:   06/01/18 150/87   06/01/18 135/77   05/22/18 174/88    Weight from Last 3 Encounters:   06/01/18 154 lb (69.9 kg)   06/01/18 154 lb 11.2 oz (70.2 kg)   05/22/18 160 lb (72.6 kg)              We Performed the Following     UA reflex to Microscopic and Culture     Urine Microscopic          Today's Medication Changes          These changes are accurate as of 6/1/18  4:03 PM.  If you have any questions, ask your nurse or doctor.               These medicines have changed or have updated prescriptions.        Dose/Directions    furosemide 40 MG tablet   Commonly known as:  LASIX   This may have changed:  how much to take   Used for:  (HFpEF) heart failure with preserved ejection fraction (H)   Changed by:  Angelica Grady APRN CNP        Dose:  20 mg   Take 0.5 tablets (20 mg) by mouth daily   Quantity:  90 tablet   Refills:  1            Where to get your medicines      Some of these will need a paper prescription and others can be bought over the counter.  Ask your nurse if you have questions.     You don't need a prescription for these medications     furosemide 40 MG tablet                Primary Care Provider Office Phone # Fax #    Neisha MARIMAR Esparza [phone] [address] [facility] [address]  [facility] [address]        Goals        Diet    Have 3 meals a day     Notes - Note created  5/23/2018 10:20 AM by Crystal Burciaga, RN    Goal Statement: I will try to eat 3 small meals a day   Measure of Success: PCP will continue to weigh the patient at her appointments and monitor for increased weight loss.   Supportive Steps to Achieve: Education on the importance of eating 3 meals day.   Barriers: Loss of appetite stated by the patient.   Strengths: Patient moving to [facility] in July with meals included.   Date to Achieve By: August 1, 2018          General    Healthy Eating (pt-stated)     Pain Management (pt-stated)     Pain Management  (pt-stated)     Notes - Note created  5/23/2018 10:17 AM by Crystal Burciaga, RN    Goal Statement: I will continue to take my pain medication as prescribed. I will call my physician if the pain gets worse.   Measure of Success: Patient will stay out of the ED. Patient will call her PCP if needed. RN CC will continue to call the patient to assess how her pain is.   Supportive Steps to Achieve: RN CC will reach out monthly to assess pain. Patient will continue to keep her appointments.   Strengths: Good PCP support. Appointment scheduled with [facility].   Date to Achieve By: August 1, 2018      start date: 4/17/14 I will weigh myself daily and if any weight gain or shortness of breath I will call the clinic. (pt-stated)     Notes - Note created  4/17/2014  3:59 PM by Margareth Pichardo, RN    As of today's date 4/17/2014 goal is met at 0 - 25%.   Goal Status:  Active        Equal Access to Services     [facility] AH: Hadii sukhdeep bundyo Somariia, waaxda luqadaha, qaybta kaalmada adeegyada, pamela mccullough . So [facility] [phone].    ATENCIÓN: Si habla español, tiene a gupta disposición servicios gratuitos de asistencia lingüística. Llame al [phone].    We comply with applicable federal civil rights laws and [place] laws. We do not discriminate on the basis of race, color, national origin, age, disability, sex, sexual orientation, or gender identity.            Thank you!     Thank you for choosing [facility]  for your care. Our goal is always to provide you with excellent care. Hearing back from our patients is one way we can continue to improve our services. Please take a few minutes to complete the written survey that you may receive in the mail after your visit with us. Thank you!             Your Updated Medication List - Protect others around you: Learn how to safely use, store and throw away your medicines at www.disposemymeds.org.          This list is accurate as of  6/1/18  4:03 PM.  Always use your most recent med list.                   Brand Name Dispense Instructions for use Diagnosis    ACCU-CHEK SMARTVIEW test strip   Generic drug:  blood glucose monitoring     200 strip    USE 1 STRIP BY IN VITRO ROUTE 2 TIMES DAILY OR AS DIRECTED    Type 2 diabetes mellitus with diabetic nephropathy (H)       albuterol 108 (90 Base) MCG/ACT Inhaler    PROAIR HFA/PROVENTIL HFA/VENTOLIN HFA    1 Inhaler    Inhale 2 puffs into the lungs every 4 hours as needed for shortness of breath / dyspnea or wheezing    COPD (chronic obstructive pulmonary disease) (H)       apixaban ANTICOAGULANT 2.5 MG tablet    ELIQUIS    180 tablet    Take 1 tablet (2.5 mg) by mouth 2 times daily    Atrial fibrillation, unspecified type (H)       aspirin 81 MG EC tablet      Take 1 tablet (81 mg) by mouth daily    NSTEMI (non-ST elevated myocardial infarction) (H)       fluticasone 50 MCG/ACT spray    FLONASE     Spray 1-2 sprays into both nostrils every evening        furosemide 40 MG tablet    LASIX    90 tablet    Take 0.5 tablets (20 mg) by mouth daily    (HFpEF) heart failure with preserved ejection fraction (H)       GLUCOTROL PO      Take 5 mg by mouth every morning (before breakfast)        hydrocortisone 2.5 % cream    ANUSOL-HC    30 g    Place rectally 2 times daily    External hemorrhoids       hydrOXYzine 25 MG capsule    VISTARIL    180 capsule    Take 1 capsule (25 mg) by mouth 2 times daily as needed for itching    Itching       Lidocaine 4 % Patch    LIDOCARE    30 patch    Place 1 patch onto the skin every 24 hours    Chronic back pain, unspecified back location, unspecified back pain laterality       lisinopril 10 MG tablet    PRINIVIL/ZESTRIL    180 tablet    Take 1 tablet (10 mg) by mouth 2 times daily    NSTEMI (non-ST elevated myocardial infarction) (H)       magnesium hydroxide 400 MG/5ML suspension    MILK OF MAGNESIA     Take 30 mLs by mouth At Bedtime        metoprolol tartrate 25 MG  tablet    LOPRESSOR    180 tablet    Take 1 tablet (25 mg) by mouth 2 times daily    NSTEMI (non-ST elevated myocardial infarction) (H)       ondansetron 4 MG ODT tab    ZOFRAN ODT    30 tablet    Take 1 tablet (4 mg) by mouth every 6 hours as needed for nausea    Nausea       oxyCODONE-acetaminophen 5-325 MG per tablet    PERCOCET    40 tablet    Take 1 tablet by mouth 2 times daily as needed for severe pain    Radicular pain       PRESERVISION/LUTEIN Caps      Take 2 capsules by mouth daily        ranitidine 150 MG tablet    ZANTAC    180 tablet    TAKE ONE TABLET BY MOUTH TWO TIMES A DAY    Dyspepsia       simvastatin 10 MG tablet    ZOCOR    90 tablet    TAKE 1 TABLET (10 MG) BY MOUTH AT BEDTIME    Type 2 diabetes mellitus with diabetic nephropathy, without long-term current use of insulin (H)       tiotropium 18 MCG capsule    SPIRIVA     Inhale 18 mcg into the lungs every evening

## 2018-06-01 NOTE — PATIENT INSTRUCTIONS
Decrease to 20 mg daily  Please weight daily. If weight increases >3 lbs in a day or 5 lbs in a week, please call.  Limit your potassium intake in your foods  See me in 3 weeks with a lab draw    Call my nurse with any questions or concerns:  [phone]           Component      Latest Ref Rng & Units 6/1/2018   Sodium      136 - 145 mmol/L 131 (L)   Potassium      3.5 - 5.1 mmol/L 5.1   Chloride      98 - 107 mmol/L 96 (L)   Carbon Dioxide      23 - 29 mmol/L 30 (H)   Anion Gap      6 - 17 mmol/L 10.1   Glucose      70 - 105 mg/dL 161 (H)   Urea Nitrogen      7 - 30 mg/dL 32 (H)   Creatinine      0.70 - 1.30 mg/dL 1.49 (H)   GFR Estimate      >60 mL/min/1.7m2 34 (L)   GFR Estimate If Black      >60 mL/min/1.7m2 41 (L)   Calcium      8.5 - 10.5 mg/dL 9.8

## 2018-06-01 NOTE — MR AVS SNAPSHOT
After Visit Summary   6/1/2018    Helene Gibbs    MRN: [id number]           Patient Information     Date Of Birth          1935        Visit Information        Provider Department      6/1/2018 1:30 PM Angelica Grady APRN CNP [facility]        Today's Diagnoses     NSTEMI (non-ST elevated myocardial infarction) (H)        Acute on chronic diastolic congestive heart failure (H)        (HFpEF) heart failure with preserved ejection fraction (H)          Care Instructions    Decrease to 20 mg daily  Please weight daily. If weight increases >3 lbs in a day or 5 lbs in a week, please call.  Limit your potassium intake in your foods  See me in 3 weeks with a lab draw    Call my nurse with any questions or concerns:  [phone]           Component      Latest Ref Rng & Units 6/1/2018   Sodium      136 - 145 mmol/L 131 (L)   Potassium      3.5 - 5.1 mmol/L 5.1   Chloride      98 - 107 mmol/L 96 (L)   Carbon Dioxide      23 - 29 mmol/L 30 (H)   Anion Gap      6 - 17 mmol/L 10.1   Glucose      70 - 105 mg/dL 161 (H)   Urea Nitrogen      7 - 30 mg/dL 32 (H)   Creatinine      0.70 - 1.30 mg/dL 1.49 (H)   GFR Estimate      >60 mL/min/1.7m2 34 (L)   GFR Estimate If Black      >60 mL/min/1.7m2 41 (L)   Calcium      8.5 - 10.5 mg/dL 9.8             Follow-ups after your visit        Additional Services     Follow-Up with Cardiac Advanced Practice Provider                 Your next 10 appointments already scheduled     Jun 01, 2018  2:30 PM CDT   Office Visit with ALLEN James CNP   [facility] ([facility]    [address] [id number] [phone]           Bring a current list of meds and any records pertaining to this visit. For Physicals, please bring immunization records and any forms needing to be filled out. Please arrive 10 minutes early to complete paperwork.            Jul 17, 2018 12:30 PM CDT   Office Visit  with Neisha Esparza MD   [facility] ([facility])    [address] [id number] [phone]           Bring a current list of meds and any records pertaining to this visit. For Physicals, please bring immunization records and any forms needing to be filled out. Please arrive 10 minutes early to complete paperwork.            Jul 31, 2018  1:45 PM CDT   Pacemaker Check with CHASE SAN   [facility] ([facility])    [address] [id number]  [facility] [id number] [phone] OPT 2            Aug 06, 2018  3:15 PM CDT   [facility] EP RETURN with Jc Juarez MD   [facility] ([facility])    [address] [id number]  [facility] [id number] [phone] OPT 2              Future tests that were ordered for you today     Open Future Orders        Priority Expected Expires Ordered    Basic metabolic panel Routine 6/22/2018 6/1/2019 6/1/2018    N terminal pro BNP outpatient Routine 6/22/2018 6/1/2019 6/1/2018    Follow-Up with Cardiac Advanced Practice Provider Routine 6/22/2018 6/1/2019 6/1/2018            Who to contact     If you have questions or need follow up information about today's clinic visit or your schedule please contact [facility] directly at [phone].  Normal or non-critical lab and imaging results will be communicated to you by MyChart, letter or phone within 4 business days after the clinic has received the results. If you do not hear from us within 7 days, please contact the clinic through MyChart or phone. If you have a critical or abnormal lab result, we will notify you by phone as soon as possible.  Submit refill requests through [employer] or call your pharmacy and they will forward the refill request to us. Please allow 3 business days for your refill to be completed.          Additional Information About  "Your Visit        [employer] Information     [employer] gives you secure access to your electronic health record. If you see a primary care provider, you can also send messages to your care team and make appointments. If you have questions, please call your primary care clinic.  If you do not have a primary care provider, please call [phone] and they will assist you.        Care EveryWhere ID     This is your Care EveryWhere ID. This could be used by other organizations to access your [place] medical records  [id number]        Your Vitals Were     Pulse Height Last Period BMI (Body Mass Index)          82 1.676 m (5' 6\") (LMP Unknown) 24.97 kg/m2         Blood Pressure from Last 3 Encounters:   06/01/18 135/77   05/22/18 174/88   05/14/18 98/62    Weight from Last 3 Encounters:   06/01/18 70.2 kg (154 lb 11.2 oz)   05/22/18 72.6 kg (160 lb)   05/14/18 72.2 kg (159 lb 3.2 oz)              We Performed the Following     Follow-Up with Cardiac Advanced Practice Provider          Today's Medication Changes          These changes are accurate as of 6/1/18  2:07 PM.  If you have any questions, ask your nurse or doctor.               These medicines have changed or have updated prescriptions.        Dose/Directions    furosemide 40 MG tablet   Commonly known as:  LASIX   This may have changed:  how much to take   Used for:  (HFpEF) heart failure with preserved ejection fraction (H)   Changed by:  Angelica Grady, APRN CNP        Dose:  20 mg   Take 0.5 tablets (20 mg) by mouth daily   Quantity:  90 tablet   Refills:  1            Where to get your medicines      Some of these will need a paper prescription and others can be bought over the counter.  Ask your nurse if you have questions.     You don't need a prescription for these medications     furosemide 40 MG tablet                Primary Care Provider Office Phone # Fax #    Neisha Esparza [phone] [address]           "      [place] [address]        Goals        Diet    Have 3 meals a day     Notes - Note created  5/23/2018 10:20 AM by Crystal Burciaga, WILBERT    Goal Statement: I will try to eat 3 small meals a day   Measure of Success: PCP will continue to weigh the patient at her appointments and monitor for increased weight loss.   Supportive Steps to Achieve: Education on the importance of eating 3 meals day.   Barriers: Loss of appetite stated by the patient.   Strengths: Patient moving to [facility] in July with meals included.   Date to Achieve By: August 1, 2018          General    Healthy Eating (pt-stated)     Pain Management (pt-stated)     Pain Management (pt-stated)     Notes - Note created  5/23/2018 10:17 AM by Crystal Burciaga, WILBERT    Goal Statement: I will continue to take my pain medication as prescribed. I will call my physician if the pain gets worse.   Measure of Success: Patient will stay out of the ED. Patient will call her PCP if needed. RN CC will continue to call the patient to assess how her pain is.   Supportive Steps to Achieve: RN CC will reach out monthly to assess pain. Patient will continue to keep her appointments.   Strengths: Good PCP support. Appointment scheduled with [facility].   Date to Achieve By: August 1, 2018      start date: 4/17/14 I will weigh myself daily and if any weight gain or shortness of breath I will call the clinic. (pt-stated)     Notes - Note created  4/17/2014  3:59 PM by Margareth Pichardo, RN    As of today's date 4/17/2014 goal is met at 0 - 25%.   Goal Status:  Active        Equal Access to Services     [facility] AH: Hadii sukhdeep mercer hadasho Sojovanyali, waaxda luqadaha, qaybta kaalmada pamela castañeda . So [facility] [phone].    ATENCIÓN: Si habla español, tiene a gupta disposición servicios gratuitos de asistencia lingüística. Llame al [phone].    We comply with applicable federal civil rights laws and [place] laws. We do not discriminate on the basis of  race, color, national origin, age, disability, sex, sexual orientation, or gender identity.            Thank you!     Thank you for choosing [facility]  for your care. Our goal is always to provide you with excellent care. Hearing back from our patients is one way we can continue to improve our services. Please take a few minutes to complete the written survey that you may receive in the mail after your visit with us. Thank you!             Your Updated Medication List - Protect others around you: Learn how to safely use, store and throw away your medicines at www.disposemymeds.org.          This list is accurate as of 6/1/18  2:07 PM.  Always use your most recent med list.                   Brand Name Dispense Instructions for use Diagnosis    ACCU-CHEK SMARTVIEW test strip   Generic drug:  blood glucose monitoring     200 strip    USE 1 STRIP BY IN VITRO ROUTE 2 TIMES DAILY OR AS DIRECTED    Type 2 diabetes mellitus with diabetic nephropathy (H)       albuterol 108 (90 Base) MCG/ACT Inhaler    PROAIR HFA/PROVENTIL HFA/VENTOLIN HFA    1 Inhaler    Inhale 2 puffs into the lungs every 4 hours as needed for shortness of breath / dyspnea or wheezing    COPD (chronic obstructive pulmonary disease) (H)       apixaban ANTICOAGULANT 2.5 MG tablet    ELIQUIS    180 tablet    Take 1 tablet (2.5 mg) by mouth 2 times daily    Atrial fibrillation, unspecified type (H)       aspirin 81 MG EC tablet      Take 1 tablet (81 mg) by mouth daily    NSTEMI (non-ST elevated myocardial infarction) (H)       fluticasone 50 MCG/ACT spray    FLONASE     Spray 1-2 sprays into both nostrils every evening        furosemide 40 MG tablet    LASIX    90 tablet    Take 0.5 tablets (20 mg) by mouth daily    (HFpEF) heart failure with preserved ejection fraction (H)       GLUCOTROL PO      Take 5 mg by mouth every morning (before breakfast)        hydrocortisone 2.5 % cream    ANUSOL-HC    30 g    Place  rectally 2 times daily    External hemorrhoids       hydrOXYzine 25 MG capsule    VISTARIL    180 capsule    Take 1 capsule (25 mg) by mouth 2 times daily as needed for itching    Itching       Lidocaine 4 % Patch    LIDOCARE    30 patch    Place 1 patch onto the skin every 24 hours    Chronic back pain, unspecified back location, unspecified back pain laterality       lisinopril 10 MG tablet    PRINIVIL/ZESTRIL    180 tablet    Take 1 tablet (10 mg) by mouth 2 times daily    NSTEMI (non-ST elevated myocardial infarction) (H)       magnesium hydroxide 400 MG/5ML suspension    MILK OF MAGNESIA     Take 30 mLs by mouth At Bedtime        metoprolol tartrate 25 MG tablet    LOPRESSOR    180 tablet    Take 1 tablet (25 mg) by mouth 2 times daily    NSTEMI (non-ST elevated myocardial infarction) (H)       ondansetron 4 MG ODT tab    ZOFRAN ODT    30 tablet    Take 1 tablet (4 mg) by mouth every 6 hours as needed for nausea    Nausea       oxyCODONE-acetaminophen 5-325 MG per tablet    PERCOCET    40 tablet    Take 1 tablet by mouth 2 times daily as needed for severe pain    Radicular pain       PRESERVISION/LUTEIN Caps      Take 2 capsules by mouth daily        ranitidine 150 MG tablet    ZANTAC    180 tablet    TAKE ONE TABLET BY MOUTH TWO TIMES A DAY    Dyspepsia       simvastatin 10 MG tablet    ZOCOR    90 tablet    TAKE 1 TABLET (10 MG) BY MOUTH AT BEDTIME    Type 2 diabetes mellitus with diabetic nephropathy, without long-term current use of insulin (H)       tiotropium 18 MCG capsule    SPIRIVA     Inhale 18 mcg into the lungs every evening

## 2018-06-01 NOTE — PROGRESS NOTES
Service Date: 06/01/2018      HISTORY OF PRESENT ILLNESS:  Ms. Gibbs is a delightful 83-year-old woman well known to me here at the clinic.  She is an established patient of Dr. Juarez.  She has a history of permanent atrial fibrillation with previous AV alexandra ablation and pacemaker implantation.  She also has a history of HFpEF and hypertension.      Unfortunately, she was admitted to [facility] earlier this month with a heart failure exacerbation and non-STEMI.  She was admitted on 05/05 and discharged on 05/08.  The patient knew her weight had been climbing; however, she felt that it was not significantly elevated.  Upon presentation to the hospital, she states that she had significant shortness of breath.  She also reported chest pain.  The patient states that she does not necessarily remember having chest pain per se; however, is very frustrated that she was never told during her hospital stay that she had a non-ST elevation MI.  The patient, per the report, appeared with atypical epigastric/chest discomfort.  She was given oral nitro.  Her troponin bumped to a peak of 0.79.  Dr. Alvarez saw her in consultation.  Metoprolol was added to her regimen.  Her Lasix was increased to 40 mg daily for her HFpEF.  She also has a history of tobacco use, and smoking cessation was encouraged.      I had just seen the patient in 03/2018, which she was doing quite well.  I reviewed her echo at that time which showed an EF of 50%-55% with moderate TR and mild pulmonary hypertension.  Again, she was diuresed with IV Lasix and transitioned to 40 mg of oral Lasix.  She lost 5.37 liters.  She is here today for a post-hospital followup.      She is on Eliquis at 2.5 mg b.i.d. and metoprolol as outlined above.        Device interrogation on 05/01 was a 90-day Teletrace that showed chronic atrial fibrillation with appropriate V pacing.  Her annual threshold is scheduled for August.      At today's visit, she is doing  well.  Her weight is down to 154 pounds.  On 04/16/2018, her weight was noted at 175.  Her lymphedema has improved significantly.  She admits that she has been too nervous to eat and was taken back when she heard that she had a small heart attack.      BMP today shows a bump of her BUN and creatinine to 32/1.49 with a GFR of 34.  Potassium 5.1.  Serum sodium of 131.  The patient also has a history of anemia and is followed by Dr. Cali.  Hemoglobin on 05/22 was 15.7.  All other review of systems and past medical history are noted below.      ASSESSMENT AND PLAN:  Ms. Gibbs is a delightful 83-year-old woman here today for followup.   1.  Status post hospitalization for acute-on-chronic exacerbation of HFpEF, EF 50%-55%.  I have asked that she decrease her Lasix to 20 mg daily.  She will continue to weigh daily and contact us for a weight gain of 3 pounds in a day or 5 pounds in a week.  She needs to also needs to modify her sodium in her diet.  At her next visit if she is stabilized and feeling more comfortable, I will continue to follow her closely.  I have also offered to have her follow up in our [facility].   The patient is going to be moving in July to assisted living.  We can discuss it further at the next visit.   2.  Atrial fibrillation/flutter with AV alexandra ablation and pacemaker in the past.  She is on Eliquis and metoprolol.  Her device is functioning normally.     3.  Non-STEMI.  Again, peak troponin was 0.079.  Cardiac risk factors for coronary artery disease include age, hypertension, diabetes mellitus and tobacco history.  She was seen in consultation by Dr. Alvarez.  A stress test was completed which showed a small area of apical ischemia.  No wall motion abnormalities were noted.  EF was 61%.  When compared to the previous stress test from 2012, the apical ischemia appears to be new.  She still is asymptomatic without complaints of chest discomfort.  Due to her cardiac risk factors, it would not  be surprising that she does have some mild coronary disease.  If she were to become symptomatic, I would recommend a further ischemic workup.   3.  Hypertension.  She is normotensive today at 135/77.      The patient has had some right upper quadrant discomfort.  She has had a CT scan and nothing has been noted.  She also has chronic midline low back pain and sciatica.  She is being followed now by [facility].      As always, we appreciate being involved in the care of this delightful patient.  I would like to see her back in 2-3 weeks with a repeat BMP and NT-proBNP.  We will reassess her if she is having chest pain at that time.  She will contact me in the interim if there are concerns.         VANESA SALCIDO NP             D: 2018   T: 2018   MT: BLANQUITA      Name:     PATRICIA BOBO   MRN:      [id number]        Account:      [id number]   :      1935           Service Date: 2018      Document: [id number]

## 2018-06-02 ASSESSMENT — ANXIETY QUESTIONNAIRES: GAD7 TOTAL SCORE: 5

## 2018-06-08 ENCOUNTER — TRANSFERRED RECORDS (OUTPATIENT)
Dept: HEALTH INFORMATION MANAGEMENT | Facility: CLINIC | Age: 83
End: 2018-06-08

## 2018-06-08 ENCOUNTER — PATIENT OUTREACH (OUTPATIENT)
Dept: CARE COORDINATION | Facility: CLINIC | Age: 83
End: 2018-06-08

## 2018-06-08 NOTE — PROGRESS NOTES
Clinic Care Coordination Contact  [facility] [place]/Voicemail       Clinical Data: Care Coordinator Outreach  Outreach attempted x 1.  Left message on voicemail with call back information and requested return call.  Plan: Care Coordinator will try to reach patient again in 3-5 business days.    Tomasa Wagner RN  Clinic Care Coordinator  [phone]  [email or web address]

## 2018-06-08 NOTE — LETTER
[place] CARE COORDINATION  [facility]   June 12, 2018    Helene Gibbs  [address]   [place] [address]      Dear Helene,    I am a clinic care coordinator who works with Neisha Esparza MD at [facility]. I have been trying to reach you recently to introduce Clinic Care Coordination and to see if there was anything I could assist you with.  I wanted to introduce myself and provide you with my contact information so that you can call me with questions or concerns about your health care. Below is a description of clinic care coordination and how I can further assist you.     The clinic care coordinator is a registered nurse and/or  who understand the health care system. The goal of clinic care coordination is to help you manage your health and improve access to the [place] system in the most efficient manner. The registered nurse can assist you in meeting your health care goals by providing education, coordinating services, and strengthening the communication among your providers. The  can assist you with financial, behavioral, psychosocial, chemical dependency, counseling, and/or psychiatric resources.    Please feel free to contact me at [phone], with any questions or concerns. We at [place] are focused on providing you with the highest-quality healthcare experience possible and that all starts with you.     Sincerely,     Crystal Burciaga RN

## 2018-06-13 NOTE — PROGRESS NOTES
Clinic Care Coordination Contact  [facility]/Voicemail       Clinical Data: Care Coordinator Outreach  Outreach attempted x 2.  Left message on voicemail with call back information and requested return call.  Plan: Care Coordinator mailed out care coordination introduction letter on 6/12/2018. Care Coordinator will do no further outreaches at this time.    Tomasa Wagner RN  Clinic Care Coordinator  [phone]  [email or web address]

## 2018-06-20 ENCOUNTER — OFFICE VISIT (OUTPATIENT)
Dept: CARDIOLOGY | Facility: CLINIC | Age: 83
End: 2018-06-20
Attending: NURSE PRACTITIONER
Payer: MEDICARE

## 2018-06-20 VITALS
BODY MASS INDEX: 26.03 KG/M2 | SYSTOLIC BLOOD PRESSURE: 124 MMHG | WEIGHT: 162 LBS | DIASTOLIC BLOOD PRESSURE: 64 MMHG | HEART RATE: 72 BPM | HEIGHT: 66 IN

## 2018-06-20 DIAGNOSIS — I50.30 (HFPEF) HEART FAILURE WITH PRESERVED EJECTION FRACTION (H): Primary | ICD-10-CM

## 2018-06-20 DIAGNOSIS — I50.30 (HFPEF) HEART FAILURE WITH PRESERVED EJECTION FRACTION (H): ICD-10-CM

## 2018-06-20 DIAGNOSIS — I10 BENIGN ESSENTIAL HYPERTENSION: ICD-10-CM

## 2018-06-20 LAB
ANION GAP SERPL CALCULATED.3IONS-SCNC: 9.7 MMOL/L (ref 6–17)
BUN SERPL-MCNC: 27 MG/DL (ref 7–30)
CALCIUM SERPL-MCNC: 10.4 MG/DL (ref 8.5–10.5)
CHLORIDE SERPL-SCNC: 95 MMOL/L (ref 98–107)
CO2 SERPL-SCNC: 30 MMOL/L (ref 23–29)
CREAT SERPL-MCNC: 1.17 MG/DL (ref 0.7–1.3)
GFR SERPL CREATININE-BSD FRML MDRD: 44 ML/MIN/1.7M2
GLUCOSE SERPL-MCNC: 134 MG/DL (ref 70–105)
NT-PROBNP SERPL-MCNC: 3403 PG/ML (ref 0–450)
POTASSIUM SERPL-SCNC: 4.7 MMOL/L (ref 3.5–5.1)
SODIUM SERPL-SCNC: 130 MMOL/L (ref 136–145)

## 2018-06-20 PROCEDURE — 83880 ASSAY OF NATRIURETIC PEPTIDE: CPT | Performed by: NURSE PRACTITIONER

## 2018-06-20 PROCEDURE — 99214 OFFICE O/P EST MOD 30 MIN: CPT | Performed by: NURSE PRACTITIONER

## 2018-06-20 PROCEDURE — 80048 BASIC METABOLIC PNL TOTAL CA: CPT | Performed by: NURSE PRACTITIONER

## 2018-06-20 PROCEDURE — 36415 COLL VENOUS BLD VENIPUNCTURE: CPT | Performed by: NURSE PRACTITIONER

## 2018-06-20 NOTE — PROGRESS NOTES
HPI and Plan: #[id number]  See dictation    No orders of the defined types were placed in this encounter.      No orders of the defined types were placed in this encounter.      There are no discontinued medications.      Encounter Diagnosis   Name Primary?     (HFpEF) heart failure with preserved ejection fraction (H)        CURRENT MEDICATIONS:  Current Outpatient Prescriptions   Medication Sig Dispense Refill     ACCU-CHEK SMARTVIEW test strip USE 1 STRIP BY IN VITRO ROUTE 2 TIMES DAILY OR AS DIRECTED 200 strip 1     albuterol (PROAIR HFA, PROVENTIL HFA, VENTOLIN HFA) 108 (90 BASE) MCG/ACT inhaler Inhale 2 puffs into the lungs every 4 hours as needed for shortness of breath / dyspnea or wheezing 1 Inhaler 5     apixaban ANTICOAGULANT (ELIQUIS) 2.5 MG tablet Take 1 tablet (2.5 mg) by mouth 2 times daily 180 tablet 3     aspirin EC 81 MG EC tablet Take 1 tablet (81 mg) by mouth daily       fluticasone (FLONASE) 50 MCG/ACT spray Spray 1-2 sprays into both nostrils every evening       furosemide (LASIX) 40 MG tablet Take 0.5 tablets (20 mg) by mouth daily 90 tablet 1     GlipiZIDE (GLUCOTROL PO) Take 5 mg by mouth every morning (before breakfast)       hydrocortisone (ANUSOL-HC) 2.5 % cream Place rectally 2 times daily 30 g 0     hydrOXYzine (VISTARIL) 25 MG capsule Take 1 capsule (25 mg) by mouth 2 times daily as needed for itching 180 capsule 1     Lidocaine (LIDOCARE) 4 % Patch Place 1 patch onto the skin every 24 hours 30 patch 1     lisinopril (PRINIVIL/ZESTRIL) 10 MG tablet Take 1 tablet (10 mg) by mouth 2 times daily 180 tablet 1     magnesium hydroxide (MILK OF MAGNESIA) 400 MG/5ML suspension Take 30 mLs by mouth At Bedtime        metoprolol tartrate (LOPRESSOR) 25 MG tablet Take 1 tablet (25 mg) by mouth 2 times daily 180 tablet 3     Multiple Vitamins-Minerals (PRESERVISION/LUTEIN) CAPS Take 2 capsules by mouth daily        ondansetron (ZOFRAN ODT) 4 MG ODT tab Take 1 tablet (4 mg) by mouth every 6 hours as  needed for nausea 30 tablet 1     oxyCODONE-acetaminophen (PERCOCET) 5-325 MG per tablet Take 1 tablet by mouth 2 times daily as needed for severe pain 40 tablet 0     ranitidine (ZANTAC) 150 MG tablet TAKE ONE TABLET BY MOUTH TWO TIMES A  tablet 3     simvastatin (ZOCOR) 10 MG tablet TAKE 1 TABLET (10 MG) BY MOUTH AT BEDTIME 90 tablet 2     tiotropium (SPIRIVA) 18 MCG capsule Inhale 18 mcg into the lungs every evening         ALLERGIES     Allergies   Allergen Reactions     Ambien [Zolpidem Tartrate] Visual Disturbance     Hallucinations and fell out of bed at night.     Penicillins Hives     Definity      Caused a syncopal episode.     Sulfa Drugs Itching     Cymbalta Rash     Fluconazole Rash       PAST MEDICAL HISTORY:  Past Medical History:   Diagnosis Date     Anemia      Ankle arthritis      Arthritis      Back pain      Bell's palsy 9/08    left     Breast CA (H) 2004-    left lumpectomy, radiation, -recurrence     CKD (chronic kidney disease)     stage 3 baseline Cr 1.3     Colon polyps     colonoscopy-9/12-next due in 9/13     Congestive heart failure (H)      COPD (chronic obstructive pulmonary disease) (H)      Coronary artery disease      DM (diabetes mellitus) (H)      GERD (gastroesophageal reflux disease)      Hyperlipidemia      Hypertension      Lumbar spinal stenosis     use cane     Nicotine dependence      Obesity      Osteoporosis      Other chronic pain      Pacemaker      Permanent atrial fibrillation (H) 8/2008    S/P AV node ablation and pacemaker 10-25-12       Pleural effusion      Pulmonary hypertension      PVD (peripheral vascular disease) (H)      Rectal stenosis      Tobacco abuse     current     Tricuspid regurgitation        PAST SURGICAL HISTORY:  Past Surgical History:   Procedure Laterality Date     ARTHROSCOPY SHOULDER RT/LT  1999, 2004    Bilateral, right then left     BONE MARROW BIOPSY, BONE SPECIMEN, NEEDLE/TROCAR N/A 8/29/2017    Procedure: BIOPSY BONE MARROW;  BONE  MARROW BIOPSY;  Surgeon: Marino Cohen MD;  Location:  GI     C DEXA, BONE DENSITY, AXIAL SKEL       C MAMMOGRAM, SCREENING  2009     COLONOSCOPY N/A 10/7/2016    Procedure: COMBINED COLONOSCOPY, SINGLE OR MULTIPLE BIOPSY/POLYPECTOMY BY BIOPSY;  Surgeon: Cassandra Mccord MD;  Location:  GI     ESOPHAGOSCOPY, GASTROSCOPY, DUODENOSCOPY (EGD), COMBINED N/A 8/10/2017    Procedure: COMBINED ESOPHAGOSCOPY, GASTROSCOPY, DUODENOSCOPY (EGD), BIOPSY SINGLE OR MULTIPLE;  gastroscopy;  Surgeon: Helene Bustamante MD;  Location:  GI     H ABLATION AV NODE  10/2012     HC COLONOSCOPY THRU STOMA, DIAGNOSTIC  ?      IMPLANT PACEMAKER  10/2012    [place] [id number], [id number]     JOINT REPLACEMTN, KNEE RT/LT      Joint Replacement knee bilateral     LAPAROSCOPIC CHOLECYSTECTOMY WITH CHOLANGIOGRAMS N/A 2015    Procedure: LAPAROSCOPIC CHOLECYSTECTOMY WITH CHOLANGIOGRAMS;  Surgeon: Ervin Amos MD;  Location:  OR     LUMPECTOMY BREAST      Left-     PHACOEMULSIFICATION CLEAR CORNEA WITH STANDARD INTRAOCULAR LENS IMPLANT Left 2015    Procedure: PHACOEMULSIFICATION CLEAR CORNEA WITH STANDARD INTRAOCULAR LENS IMPLANT;  Surgeon: Sai Obregon MD;  Location:  EC     PHACOEMULSIFICATION CLEAR CORNEA WITH TORIC INTRAOCULAR LENS IMPLANT Right 2017    Procedure: PHACOEMULSIFICATION CLEAR CORNEA WITH TORIC INTRAOCULAR LENS IMPLANT;  RIGHT EYE PHACOEMULSIFICATION CLEAR CORNEA WITH TORIC INTRAOCULAR LENS IMPLANT ;  Surgeon: Duc Richmond MD;  Location:  EC       FAMILY HISTORY:  Family History   Problem Relation Age of Onset     Hypertension Mother      Diabetes Mother       at 83 yrs     C.A.D. Father       age 70s     Breast Cancer No family hx of      Colon Cancer No family hx of        SOCIAL HISTORY:  Social History     Social History     Marital status:      Spouse name: N/A     Number of children: N/A     Years of education: N/A     Social History Main  "Topics     Smoking status: Former Smoker     Packs/day: 0.25     Years: 45.00     Types: Cigarettes     Smokeless tobacco: Never Used     Alcohol use No     Drug use: No     Sexual activity: Not Currently     Partners: Female     Other Topics Concern     Caffeine Concern Yes     2 cups/day     Sleep Concern Yes     Stress Concern Yes     Weight Concern Yes     15+ lb weight loss since hospitalization      Special Diet Yes     bland diet r/t cholecystectomy, low salt      Exercise No     Seat Belt Yes     Social History Narrative    ,no childrenPOA- niece-Enedelia Claros-has plans to quitETOH-1/drink 2-3 /weekExercise-sit down DNR, DNI    Lived in [place] and [place] worked in [employer]       Review of Systems:  Skin:  Negative       Eyes:  Positive for glasses reading  ENT:  Negative      Respiratory:  Positive for shortness of breath     Cardiovascular:  Negative      Gastroenterology: Negative      Genitourinary:  not assessed      Musculoskeletal:  Positive for back pain;arthritis;joint pain pain in right rib area  Neurologic:  Positive for numbness or tingling of feet    Psychiatric:  Positive for sleep disturbances    Heme/Lymph/Imm:  Positive for allergies    Endocrine:  Positive for diabetes      Physical Exam:  Vitals: /64  Pulse 72  Ht 1.676 m (5' 6\")  Wt 73.5 kg (162 lb)  LMP  (LMP Unknown)  BMI 26.15 kg/m2    Constitutional:  cooperative, alert and oriented, well developed, well nourished, in no acute distress        Skin:  warm and dry to the touch, no apparent skin lesions or masses noted   pacemaker incision in the left infraclavicular area was well-healed      Head:  normocephalic, no masses or lesions        Eyes:  conjunctivae and lids unremarkable        Lymph:      ENT:  no pallor or cyanosis, dentition good        Neck:  JVP normal        Respiratory:  clear to auscultation         Cardiac: regular rhythm, normal S1/S2, no S3 or S4, apical impulse not " displaced, no murmurs, gallops or rubs                not assessed this visit                                        GI:  abdomen soft        Extremities and Muscular Skeletal:  no edema kyphosis            Neurological:  no gross motor deficits;affect appropriate        Psych:  Alert and Oriented x 3;affect appropriate, oriented to time, person and place;oriented to time, person and place        CC  Referred Self, MD  No address on file

## 2018-06-20 NOTE — MR AVS SNAPSHOT
After Visit Summary   6/20/2018    Helene Gibbs    MRN: [id number]           Patient Information     Date Of Birth          1935        Visit Information        Provider Department      6/20/2018 1:50 PM Angelica Grady APRN CNP [facility]        Today's Diagnoses     (HFpEF) heart failure with preserved ejection fraction (H)          Care Instructions    Lab improved  OK to take an extra 1/2 tab of lasix once or twice a week  Weigh daily (wt up 8 lbs) goal 155-158 lbs    Call if your weight is >165 lbs: [phone]        Component      Latest Ref Rng & Units 6/1/2018 6/20/2018   Sodium      136 - 145 mmol/L 131 (L) 130 (L)   Potassium      3.5 - 5.1 mmol/L 5.1 4.7   Chloride      98 - 107 mmol/L 96 (L) 95 (L)   Carbon Dioxide      23 - 29 mmol/L 30 (H) 30 (H)   Anion Gap      6 - 17 mmol/L 10.1 9.7   Glucose      70 - 105 mg/dL 161 (H) 134 (H)   Urea Nitrogen      7 - 30 mg/dL 32 (H) 27   Creatinine      0.70 - 1.30 mg/dL 1.49 (H) 1.17   GFR Estimate      >60 mL/min/1.7m2 34 (L) 44 (L)   GFR Estimate If Black      >60 mL/min/1.7m2 41 (L) 54 (L)   Calcium      8.5 - 10.5 mg/dL 9.8 10.4             Follow-ups after your visit        Your next 10 appointments already scheduled     Jul 17, 2018 12:30 PM CDT   Office Visit with Neisha Esparza MD   [facility]    [address] [id number] [phone]           Bring a current list of meds and any records pertaining to this visit. For Physicals, please bring immunization records and any forms needing to be filled out. Please arrive 10 minutes early to complete paperwork.            Aug 06, 2018  2:00 PM CDT   Pacemaker Check with STONE DCR2   [facility] ([facility])    [address] [place] [id number] [phone] OPT 2            Aug 06, 2018  3:15 PM CDT   UMP EP RETURN with  "Jc Juarez MD   [facility] ([facility] [place] [facility])    [address]  [facility] [id number] [phone] OPT 2              Who to contact     If you have questions or need follow up information about today's clinic visit or your schedule please contact [facility] directly at [phone].  Normal or non-critical lab and imaging results will be communicated to you by [employer], letter or phone within 4 business days after the clinic has received the results. If you do not hear from us within 7 days, please contact the clinic through [employer] or phone. If you have a critical or abnormal lab result, we will notify you by phone as soon as possible.  Submit refill requests through [employer] or call your pharmacy and they will forward the refill request to us. Please allow 3 business days for your refill to be completed.          Additional Information About Your Visit        [email or web address] Information     [employer] gives you secure access to your electronic health record. If you see a primary care provider, you can also send messages to your care team and make appointments. If you have questions, please call your primary care clinic.  If you do not have a primary care provider, please call [phone] and they will assist you.        Care EveryWhere ID     This is your Care EveryWhere ID. This could be used by other organizations to access your [place] medical records  [id number]        Your Vitals Were     Pulse Height Last Period BMI (Body Mass Index)          72 1.676 m (5' 6\") (LMP Unknown) 26.15 kg/m2         Blood Pressure from Last 3 Encounters:   06/20/18 124/64   06/01/18 150/87   06/01/18 135/77    Weight from Last 3 Encounters:   06/20/18 73.5 kg (162 lb)   06/01/18 69.9 kg (154 lb)   06/01/18 70.2 kg (154 lb 11.2 oz)              We Performed the Following     Follow-Up with Cardiac Advanced Practice Provider  "       Primary Care Provider Office Phone # Fax #    Neisha MARIMAR Esparza [phone] [address]  [place]                [place] [address]        Goals        Diet    Have 3 meals a day     Notes - Note created  5/23/2018 10:20 AM by Crystal Burciaga, WILBERT    Goal Statement: I will try to eat 3 small meals a day   Measure of Success: PCP will continue to weigh the patient at her appointments and monitor for increased weight loss.   Supportive Steps to Achieve: Education on the importance of eating 3 meals day.   Barriers: Loss of appetite stated by the patient.   Strengths: Patient moving to [facility] in July with meals included.   Date to Achieve By: August 1, 2018          General    Healthy Eating (pt-stated)     Pain Management (pt-stated)     Pain Management (pt-stated)     Notes - Note created  5/23/2018 10:17 AM by Crystal Burciaga RN    Goal Statement: I will continue to take my pain medication as prescribed. I will call my physician if the pain gets worse.   Measure of Success: Patient will stay out of the ED. Patient will call her PCP if needed. RN CC will continue to call the patient to assess how her pain is.   Supportive Steps to Achieve: RN CC will reach out monthly to assess pain. Patient will continue to keep her appointments.   Strengths: Good PCP support. Appointment scheduled with [facility].   Date to Achieve By: August 1, 2018      start date: 4/17/14 I will weigh myself daily and if any weight gain or shortness of breath I will call the clinic. (pt-stated)     Notes - Note created  4/17/2014  3:59 PM by Margareth Pichardo, RN    As of today's date 4/17/2014 goal is met at 0 - 25%.   Goal Status:  Active        Equal Access to Services     [facility]: Hadii sukhdeep Brady, waaxda luqlizzette, qaybta kaalpamela thompson. So [facility] [phone].    ATENCIÓN: Si habla español, tiene a gupta disposición servicios gratuitos de asistencia  lingüísticaMaryam Pan al [phone].    We comply with applicable federal civil rights laws and [place] laws. We do not discriminate on the basis of race, color, national origin, age, disability, sex, sexual orientation, or gender identity.            Thank you!     Thank you for choosing [facility]  for your care. Our goal is always to provide you with excellent care. Hearing back from our patients is one way we can continue to improve our services. Please take a few minutes to complete the written survey that you may receive in the mail after your visit with us. Thank you!             Your Updated Medication List - Protect others around you: Learn how to safely use, store and throw away your medicines at www.disposemymeds.org.          This list is accurate as of 6/20/18  2:33 PM.  Always use your most recent med list.                   Brand Name Dispense Instructions for use Diagnosis    ACCU-CHEK SMARTVIEW test strip   Generic drug:  blood glucose monitoring     200 strip    USE 1 STRIP BY IN VITRO ROUTE 2 TIMES DAILY OR AS DIRECTED    Type 2 diabetes mellitus with diabetic nephropathy (H)       albuterol 108 (90 Base) MCG/ACT Inhaler    PROAIR HFA/PROVENTIL HFA/VENTOLIN HFA    1 Inhaler    Inhale 2 puffs into the lungs every 4 hours as needed for shortness of breath / dyspnea or wheezing    COPD (chronic obstructive pulmonary disease) (H)       apixaban ANTICOAGULANT 2.5 MG tablet    ELIQUIS    180 tablet    Take 1 tablet (2.5 mg) by mouth 2 times daily    Atrial fibrillation, unspecified type (H)       aspirin 81 MG EC tablet      Take 1 tablet (81 mg) by mouth daily    NSTEMI (non-ST elevated myocardial infarction) (H)       fluticasone 50 MCG/ACT spray    FLONASE     Spray 1-2 sprays into both nostrils every evening        furosemide 40 MG tablet    LASIX    90 tablet    Take 0.5 tablets (20 mg) by mouth daily    (HFpEF) heart failure with preserved ejection fraction  (H)       GLUCOTROL PO      Take 5 mg by mouth every morning (before breakfast)        hydrocortisone 2.5 % cream    ANUSOL-HC    30 g    Place rectally 2 times daily    External hemorrhoids       hydrOXYzine 25 MG capsule    VISTARIL    180 capsule    Take 1 capsule (25 mg) by mouth 2 times daily as needed for itching    Itching       Lidocaine 4 % Patch    LIDOCARE    30 patch    Place 1 patch onto the skin every 24 hours    Chronic back pain, unspecified back location, unspecified back pain laterality       lisinopril 10 MG tablet    PRINIVIL/ZESTRIL    180 tablet    Take 1 tablet (10 mg) by mouth 2 times daily    NSTEMI (non-ST elevated myocardial infarction) (H)       magnesium hydroxide 400 MG/5ML suspension    MILK OF MAGNESIA     Take 30 mLs by mouth At Bedtime        metoprolol tartrate 25 MG tablet    LOPRESSOR    180 tablet    Take 1 tablet (25 mg) by mouth 2 times daily    NSTEMI (non-ST elevated myocardial infarction) (H)       ondansetron 4 MG ODT tab    ZOFRAN ODT    30 tablet    Take 1 tablet (4 mg) by mouth every 6 hours as needed for nausea    Nausea       oxyCODONE-acetaminophen 5-325 MG per tablet    PERCOCET    40 tablet    Take 1 tablet by mouth 2 times daily as needed for severe pain    Radicular pain       PRESERVISION/LUTEIN Caps      Take 2 capsules by mouth daily        ranitidine 150 MG tablet    ZANTAC    180 tablet    TAKE ONE TABLET BY MOUTH TWO TIMES A DAY    Dyspepsia       simvastatin 10 MG tablet    ZOCOR    90 tablet    TAKE 1 TABLET (10 MG) BY MOUTH AT BEDTIME    Type 2 diabetes mellitus with diabetic nephropathy, without long-term current use of insulin (H)       tiotropium 18 MCG capsule    SPIRIVA     Inhale 18 mcg into the lungs every evening

## 2018-06-20 NOTE — LETTER
6/20/2018    Neisha Esparza MD  [address]  [place]                [place] [address]    RE: Helene Gibbs       Dear Colleague,    I had the pleasure of seeing Helene Gibbs in the [facility].    HPI and Plan: #[id number]  See dictation    No orders of the defined types were placed in this encounter.      No orders of the defined types were placed in this encounter.      There are no discontinued medications.      Encounter Diagnosis   Name Primary?     (HFpEF) heart failure with preserved ejection fraction (H)        CURRENT MEDICATIONS:  Current Outpatient Prescriptions   Medication Sig Dispense Refill     ACCU-CHEK SMARTVIEW test strip USE 1 STRIP BY IN VITRO ROUTE 2 TIMES DAILY OR AS DIRECTED 200 strip 1     albuterol (PROAIR HFA, PROVENTIL HFA, VENTOLIN HFA) 108 (90 BASE) MCG/ACT inhaler Inhale 2 puffs into the lungs every 4 hours as needed for shortness of breath / dyspnea or wheezing 1 Inhaler 5     apixaban ANTICOAGULANT (ELIQUIS) 2.5 MG tablet Take 1 tablet (2.5 mg) by mouth 2 times daily 180 tablet 3     aspirin EC 81 MG EC tablet Take 1 tablet (81 mg) by mouth daily       fluticasone (FLONASE) 50 MCG/ACT spray Spray 1-2 sprays into both nostrils every evening       furosemide (LASIX) 40 MG tablet Take 0.5 tablets (20 mg) by mouth daily 90 tablet 1     GlipiZIDE (GLUCOTROL PO) Take 5 mg by mouth every morning (before breakfast)       hydrocortisone (ANUSOL-HC) 2.5 % cream Place rectally 2 times daily 30 g 0     hydrOXYzine (VISTARIL) 25 MG capsule Take 1 capsule (25 mg) by mouth 2 times daily as needed for itching 180 capsule 1     Lidocaine (LIDOCARE) 4 % Patch Place 1 patch onto the skin every 24 hours 30 patch 1     lisinopril (PRINIVIL/ZESTRIL) 10 MG tablet Take 1 tablet (10 mg) by mouth 2 times daily 180 tablet 1     magnesium hydroxide (MILK OF MAGNESIA) 400 MG/5ML suspension Take 30 mLs by mouth At Bedtime        metoprolol tartrate (LOPRESSOR) 25 MG tablet  Take 1 tablet (25 mg) by mouth 2 times daily 180 tablet 3     Multiple Vitamins-Minerals (PRESERVISION/LUTEIN) CAPS Take 2 capsules by mouth daily        ondansetron (ZOFRAN ODT) 4 MG ODT tab Take 1 tablet (4 mg) by mouth every 6 hours as needed for nausea 30 tablet 1     oxyCODONE-acetaminophen (PERCOCET) 5-325 MG per tablet Take 1 tablet by mouth 2 times daily as needed for severe pain 40 tablet 0     ranitidine (ZANTAC) 150 MG tablet TAKE ONE TABLET BY MOUTH TWO TIMES A  tablet 3     simvastatin (ZOCOR) 10 MG tablet TAKE 1 TABLET (10 MG) BY MOUTH AT BEDTIME 90 tablet 2     tiotropium (SPIRIVA) 18 MCG capsule Inhale 18 mcg into the lungs every evening         ALLERGIES     Allergies   Allergen Reactions     Ambien [Zolpidem Tartrate] Visual Disturbance     Hallucinations and fell out of bed at night.     Penicillins Hives     Definity      Caused a syncopal episode.     Sulfa Drugs Itching     Cymbalta Rash     Fluconazole Rash       PAST MEDICAL HISTORY:  Past Medical History:   Diagnosis Date     Anemia      Ankle arthritis      Arthritis      Back pain      Bell's palsy 9/08    left     Breast CA (H) 2004-    left lumpectomy, radiation, -recurrence     CKD (chronic kidney disease)     stage 3 baseline Cr 1.3     Colon polyps     colonoscopy-9/12-next due in 9/13     Congestive heart failure (H)      COPD (chronic obstructive pulmonary disease) (H)      Coronary artery disease      DM (diabetes mellitus) (H)      GERD (gastroesophageal reflux disease)      Hyperlipidemia      Hypertension      Lumbar spinal stenosis     use cane     Nicotine dependence      Obesity      Osteoporosis      Other chronic pain      Pacemaker      Permanent atrial fibrillation (H) 8/2008    S/P AV node ablation and pacemaker 10-25-12       Pleural effusion      Pulmonary hypertension      PVD (peripheral vascular disease) (H)      Rectal stenosis      Tobacco abuse     current     Tricuspid regurgitation        PAST SURGICAL  HISTORY:  Past Surgical History:   Procedure Laterality Date     ARTHROSCOPY SHOULDER RT/LT  ,     Bilateral, right then left     BONE MARROW BIOPSY, BONE SPECIMEN, NEEDLE/TROCAR N/A 2017    Procedure: BIOPSY BONE MARROW;  BONE MARROW BIOPSY;  Surgeon: Marino Cohen MD;  Location:  GI     C DEXA, BONE DENSITY, AXIAL SKEL       C MAMMOGRAM, SCREENING  2009     COLONOSCOPY N/A 10/7/2016    Procedure: COMBINED COLONOSCOPY, SINGLE OR MULTIPLE BIOPSY/POLYPECTOMY BY BIOPSY;  Surgeon: Cassandra Mccord MD;  Location:  GI     ESOPHAGOSCOPY, GASTROSCOPY, DUODENOSCOPY (EGD), COMBINED N/A 8/10/2017    Procedure: COMBINED ESOPHAGOSCOPY, GASTROSCOPY, DUODENOSCOPY (EGD), BIOPSY SINGLE OR MULTIPLE;  gastroscopy;  Surgeon: Helene Bustamante MD;  Location:  GI     H ABLATION AV NODE  10/2012     HC COLONOSCOPY THRU STOMA, DIAGNOSTIC  ?      IMPLANT PACEMAKER  10/2012    [id number], [id number]     JOINT REPLACEMTN, KNEE RT/LT      Joint Replacement knee bilateral     LAPAROSCOPIC CHOLECYSTECTOMY WITH CHOLANGIOGRAMS N/A 2015    Procedure: LAPAROSCOPIC CHOLECYSTECTOMY WITH CHOLANGIOGRAMS;  Surgeon: Ervin Amos MD;  Location:  OR     LUMPECTOMY BREAST      Left-     PHACOEMULSIFICATION CLEAR CORNEA WITH STANDARD INTRAOCULAR LENS IMPLANT Left 2015    Procedure: PHACOEMULSIFICATION CLEAR CORNEA WITH STANDARD INTRAOCULAR LENS IMPLANT;  Surgeon: Sai Obregon MD;  Location:  EC     PHACOEMULSIFICATION CLEAR CORNEA WITH TORIC INTRAOCULAR LENS IMPLANT Right 2017    Procedure: PHACOEMULSIFICATION CLEAR CORNEA WITH TORIC INTRAOCULAR LENS IMPLANT;  RIGHT EYE PHACOEMULSIFICATION CLEAR CORNEA WITH TORIC INTRAOCULAR LENS IMPLANT ;  Surgeon: Duc Richmond MD;  Location: [facility]       FAMILY HISTORY:  Family History   Problem Relation Age of Onset     Hypertension Mother      Diabetes Mother       at 83 yrs     C.A.D. Father       age 70s     Breast Cancer  "No family hx of      Colon Cancer No family hx of        SOCIAL HISTORY:  Social History     Social History     Marital status:      Spouse name: N/A     Number of children: N/A     Years of education: N/A     Social History Main Topics     Smoking status: Former Smoker     Packs/day: 0.25     Years: 45.00     Types: Cigarettes     Smokeless tobacco: Never Used     Alcohol use No     Drug use: No     Sexual activity: Not Currently     Partners: Female     Other Topics Concern     Caffeine Concern Yes     2 cups/day     Sleep Concern Yes     Stress Concern Yes     Weight Concern Yes     15+ lb weight loss since hospitalization      Special Diet Yes     bland diet r/t cholecystectomy, low salt      Exercise No     Seat Belt Yes     Social History Narrative    ,no childrenPOA- niece-Enedelia Claros-has plans to quitETOH-1/drink 2-3 /weekExercise-sit down DNR, DNI    Lived in [place] and [place] worked in [place]       Review of Systems:  Skin:  Negative       Eyes:  Positive for glasses reading  ENT:  Negative      Respiratory:  Positive for shortness of breath     Cardiovascular:  Negative      Gastroenterology: Negative      Genitourinary:  not assessed      Musculoskeletal:  Positive for back pain;arthritis;joint pain pain in right rib area  Neurologic:  Positive for numbness or tingling of feet    Psychiatric:  Positive for sleep disturbances    Heme/Lymph/Imm:  Positive for allergies    Endocrine:  Positive for diabetes      Physical Exam:  Vitals: /64  Pulse 72  Ht 1.676 m (5' 6\")  Wt 73.5 kg (162 lb)  LMP  (LMP Unknown)  BMI 26.15 kg/m2    Constitutional:  cooperative, alert and oriented, well developed, well nourished, in no acute distress        Skin:  warm and dry to the touch, no apparent skin lesions or masses noted   pacemaker incision in the left infraclavicular area was well-healed      Head:  normocephalic, no masses or lesions        Eyes:  conjunctivae and " lids unremarkable        Lymph:      ENT:  no pallor or cyanosis, dentition good        Neck:  JVP normal        Respiratory:  clear to auscultation         Cardiac: regular rhythm, normal S1/S2, no S3 or S4, apical impulse not displaced, no murmurs, gallops or rubs                not assessed this visit                                        GI:  abdomen soft        Extremities and Muscular Skeletal:  no edema kyphosis            Neurological:  no gross motor deficits;affect appropriate        Psych:  Alert and Oriented x 3;affect appropriate, oriented to time, person and place;oriented to time, person and place        CC  Referred Self, MD  No address on file                Thank you for allowing me to participate in the care of your patient.      Sincerely,     Angelica Grady, NP, APRN [facility]    cc:   Referred MD Tevin  No address on file

## 2018-06-20 NOTE — PATIENT INSTRUCTIONS
Lab improved  OK to take an extra 1/2 tab of lasix once or twice a week  Weigh daily (wt up 8 lbs) goal 155-158 lbs    Call if your weight is >165 lbs: [phone]        Component      Latest Ref Rng & Units 6/1/2018 6/20/2018   Sodium      136 - 145 mmol/L 131 (L) 130 (L)   Potassium      3.5 - 5.1 mmol/L 5.1 4.7   Chloride      98 - 107 mmol/L 96 (L) 95 (L)   Carbon Dioxide      23 - 29 mmol/L 30 (H) 30 (H)   Anion Gap      6 - 17 mmol/L 10.1 9.7   Glucose      70 - 105 mg/dL 161 (H) 134 (H)   Urea Nitrogen      7 - 30 mg/dL 32 (H) 27   Creatinine      0.70 - 1.30 mg/dL 1.49 (H) 1.17   GFR Estimate      >60 mL/min/1.7m2 34 (L) 44 (L)   GFR Estimate If Black      >60 mL/min/1.7m2 41 (L) 54 (L)   Calcium      8.5 - 10.5 mg/dL 9.8 10.4

## 2018-06-20 NOTE — LETTER
"6/20/2018      Neisha Esparza MD  [address] [facility] [address]  [place] [address]      RE: Helene Gibbs       Dear Colleague,    I had the pleasure of seeing Helene Gibbs in the [facility].    Service Date: 06/20/2018      HISTORY OF PRESENT ILLNESS:  Ms. Gibbs is a delightful 83-year-old woman that I am having the pleasure of seeing again in followup.  She is an established patient of Dr. Juarez.  She has a history of permanent atrial fibrillation with previous AV alexandra ablation and pacemaker implantation.  She also has a history of HFpEF and hypertension.      Unfortunately, she was admitted to [facility] 05/2018 with exacerbation of her HFpEF and non-STEMI.  She was admitted 05/05, discharged on 05/18.  The patient knew her weight was climbing; however, she was not aware of the impact it was having on her.  She presented to the hospital at 170 pounds, up 15 pounds from her normal weight.  The patient was given oral nitro and had a troponin peak at 0.79.  Dr. Hooks saw her in consultation.  Metoprolol was added to her regimen.  Her Lasix was increased to 40 mg daily, now decreased to 20 mg daily.  The patient was very frustrated because she was not aware she suffered a non-ST elevation MI until her followup with Dr. Esparza.  She states that she just felt bad after 3 days of being admitted to the hospital, she was startled to hear she had \"a heart attack.\"      I had seen this patient 03/2018 and she was doing well.  Her weight was up slightly; however, she stated her appetite was good.  Echo at that time showed an EF of 50%-55% with moderate TR, mild pulmonary hypertension.  During her hospital stay, she diuresed 5.37 liters.      At her last visit, I did decrease her Lasix.  Her weight is up 8 pounds to 162 pounds.  The patient states that she has been eating more.  She states that her appetite has been too good.  She in fact states that sometimes she has " been eating in the middle of the night because she cannot sleep.  She denies chest pain, shortness of breath, lightheadedness, dizziness or peripheral edema.  Lungs are clear.  She has no peripheral edema and no JVD.  Blood pressure today is 124/64.      BMP on her decreased dose of Lasix showed a normal creatinine of 1.17 (was 1.49).  BUN is also normalized at 27.  Serum sodium is 130.  All other review of systems, past medical history and physical exam are noted below.      ASSESSMENT AND PLAN:  Ms. Gibbs is a delightful 83-year-old woman here today for followup.   1.  Acute-on-chronic exacerbation of HFpEF, EF 50%-55%.  She is doing well.  I have asked that she continue to watch her weight.  She is to call us if her weight goes greater than 165 pounds.  In addition to her 20 mg of Lasix daily, she can take an additional 20 mg once or twice a week for weight gain of 2-3 pounds in a day or 5 pounds in a week.  I do not want to go back to 40 mg daily due to her elevated BUN and creatinine at that time.   2.  Atrial fibrillation/flutter with AV alexandra ablation and pacemaker implantation in the past.  The patient is on Eliquis and metoprolol.  Device is functioning normally.   3.  Non-STEMI with a peak troponin of 0.079.  Her risk factors for coronary artery disease include age, hypertension, previous tobacco use and diabetes.  A stress test showed a small area of apical ischemia.  No wall motion abnormalities were noted.  When compared to her previous stress test from 2012, atypical ischemia appeared new.  The patient is asymptomatic and has no complaints of chest pain.  If she were to develop symptoms, I would discuss with Dr. Juarez if further intervention would be warranted.   4.  Hypertension.  She is normotensive today at 124/64.   5.  She has also had some issues with sciatica and chronic mid lower back pain.  She is being followed with [facility] and is pleased with the care she is receiving there.      Helene grover  be following up with Dr. Esparza in July and Dr. Juarez in August for her 3-month post-hospital followup.  I would be happy to see her in the interim if there are questions or concerns.  I have discussed placing her in [facility].  I am happy to follow her closely.  She is in the process of relocating to an assisted care facility over the next several weeks.  I will leave it up to Dr. Juarez' discretion when I should see her back.      Thank you for including us in her care.         VANESA SALCIDO NP             D: 2018   T: 2018   MT:       Name:     PATRICIA BOBO   MRN:      [id number]        Account:      [id number]   :      1935           Service Date: 2018      Document: [id number]         Outpatient Encounter Prescriptions as of 2018   Medication Sig Dispense Refill     ACCU-CHEK SMARTVIEW test strip USE 1 STRIP BY IN VITRO ROUTE 2 TIMES DAILY OR AS DIRECTED 200 strip 1     albuterol (PROAIR HFA, PROVENTIL HFA, VENTOLIN HFA) 108 (90 BASE) MCG/ACT inhaler Inhale 2 puffs into the lungs every 4 hours as needed for shortness of breath / dyspnea or wheezing 1 Inhaler 5     apixaban ANTICOAGULANT (ELIQUIS) 2.5 MG tablet Take 1 tablet (2.5 mg) by mouth 2 times daily 180 tablet 3     aspirin EC 81 MG EC tablet Take 1 tablet (81 mg) by mouth daily       fluticasone (FLONASE) 50 MCG/ACT spray Spray 1-2 sprays into both nostrils every evening       furosemide (LASIX) 40 MG tablet Take 0.5 tablets (20 mg) by mouth daily 90 tablet 1     GlipiZIDE (GLUCOTROL PO) Take 5 mg by mouth every morning (before breakfast)       hydrocortisone (ANUSOL-HC) 2.5 % cream Place rectally 2 times daily 30 g 0     hydrOXYzine (VISTARIL) 25 MG capsule Take 1 capsule (25 mg) by mouth 2 times daily as needed for itching 180 capsule 1     Lidocaine (LIDOCARE) 4 % Patch Place 1 patch onto the skin every 24 hours 30 patch 1     lisinopril (PRINIVIL/ZESTRIL) 10 MG tablet Take 1 tablet (10 mg) by mouth 2  times daily 180 tablet 1     magnesium hydroxide (MILK OF MAGNESIA) 400 MG/5ML suspension Take 30 mLs by mouth At Bedtime        metoprolol tartrate (LOPRESSOR) 25 MG tablet Take 1 tablet (25 mg) by mouth 2 times daily 180 tablet 3     Multiple Vitamins-Minerals (PRESERVISION/LUTEIN) CAPS Take 2 capsules by mouth daily        ondansetron (ZOFRAN ODT) 4 MG ODT tab Take 1 tablet (4 mg) by mouth every 6 hours as needed for nausea 30 tablet 1     oxyCODONE-acetaminophen (PERCOCET) 5-325 MG per tablet Take 1 tablet by mouth 2 times daily as needed for severe pain 40 tablet 0     ranitidine (ZANTAC) 150 MG tablet TAKE ONE TABLET BY MOUTH TWO TIMES A  tablet 3     simvastatin (ZOCOR) 10 MG tablet TAKE 1 TABLET (10 MG) BY MOUTH AT BEDTIME 90 tablet 2     tiotropium (SPIRIVA) 18 MCG capsule Inhale 18 mcg into the lungs every evening       No facility-administered encounter medications on file as of 6/20/2018.          Again, thank you for allowing me to participate in the care of your patient.      Sincerely,    Angelica Grady, NP, APRN CNP     [facility]

## 2018-06-20 NOTE — PROGRESS NOTES
"Service Date: 06/20/2018      HISTORY OF PRESENT ILLNESS:  Ms. Gibbs is a delightful 83-year-old woman that I am having the pleasure of seeing again in followup.  She is an established patient of Dr. Juarez.  She has a history of permanent atrial fibrillation with previous AV alexandra ablation and pacemaker implantation.  She also has a history of HFpEF and hypertension.      Unfortunately, she was admitted to [facility] 05/2018 with exacerbation of her HFpEF and non-STEMI.  She was admitted 05/05, discharged on 05/18.  The patient knew her weight was climbing; however, she was not aware of the impact it was having on her.  She presented to the hospital at 170 pounds, up 15 pounds from her normal weight.  The patient was given oral nitro and had a troponin peak at 0.79.  Dr. Hooks saw her in consultation.  Metoprolol was added to her regimen.  Her Lasix was increased to 40 mg daily, now decreased to 20 mg daily.  The patient was very frustrated because she was not aware she suffered a non-ST elevation MI until her followup with Dr. Esparza.  She states that she just felt bad after 3 days of being admitted to the hospital, she was startled to hear she had \"a heart attack.\"      I had seen this patient 03/2018 and she was doing well.  Her weight was up slightly; however, she stated her appetite was good.  Echo at that time showed an EF of 50%-55% with moderate TR, mild pulmonary hypertension.  During her hospital stay, she diuresed 5.37 liters.      At her last visit, I did decrease her Lasix.  Her weight is up 8 pounds to 162 pounds.  The patient states that she has been eating more.  She states that her appetite has been too good.  She in fact states that sometimes she has been eating in the middle of the night because she cannot sleep.  She denies chest pain, shortness of breath, lightheadedness, dizziness or peripheral edema.  Lungs are clear.  She has no peripheral edema and no JVD.  Blood pressure " today is 124/64.      BMP on her decreased dose of Lasix showed a normal creatinine of 1.17 (was 1.49).  BUN is also normalized at 27.  Serum sodium is 130.  All other review of systems, past medical history and physical exam are noted below.      ASSESSMENT AND PLAN:  Ms. Gibbs is a delightful 83-year-old woman here today for followup.   1.  Acute-on-chronic exacerbation of HFpEF, EF 50%-55%.    She is doing well.  I have asked that she continue to watch her weight.  She is to call us if her weight goes greater than 165 pounds.  In addition to her 20 mg of Lasix daily, she can take an additional 20 mg once or twice a week for weight gain of 2-3 pounds in a day or 5 pounds in a week.  I do not want to go back to 40 mg daily due to her elevated BUN and creatinine at that time.   2.  Atrial fibrillation/flutter with AV alexandra ablation and pacemaker implantation in the past.  T  he patient is on Eliquis and metoprolol.  Device is functioning normally.   3.  Non-STEMI with a peak troponin of 0.079.    Her risk factors for coronary artery disease include age, hypertension, previous tobacco use and diabetes.  A stress test showed a small area of apical ischemia.  No wall motion abnormalities were noted.  When compared to her previous stress test from 2012, atypical ischemia appeared new.  The patient is asymptomatic and has no complaints of chest pain.  If she were to develop symptoms, I would discuss with Dr. Juarez if further intervention would be warranted.   4.  Hypertension.    She is normotensive today at 124/64.   5.  She has also had some issues with sciatica and chronic mid lower back pain.    She is being followed with [facility] and is pleased with the care she is receiving there.      Helene will be following up with Dr. Esparza in July and Dr. Juarez in August for her 3-month post-hospital followup.  I would be happy to see her in the interim if there are questions or concerns.  I have discussed placing her in  [facility].  I am happy to follow her closely.  She is in the process of relocating to an assisted care facility over the next several weeks.  I will leave it up to Dr. Juarez' discretion when I should see her back.      Thank you for including us in her care.         VANESA SALCIDO NP             D: 2018   T: 2018   MT:       Name:     PATRICIA BOBO   MRN:      [id number]        Account:      [id number]   :      1935           Service Date: 2018      Document: [id number]

## 2018-06-22 ENCOUNTER — TELEPHONE (OUTPATIENT)
Dept: CARDIOLOGY | Facility: CLINIC | Age: 83
End: 2018-06-22

## 2018-06-22 NOTE — TELEPHONE ENCOUNTER
Patient calling this afternoon wanting our opinion on an implantable pain pump that was offered by her pain clinic. It is called the Burst DR and looks like a pacemaker that is implanted in the back.     She is meeting with them again on Monday to discuss this. I told her to make sure that they know she has a pacemaker and that she is dependent because those things can interfere with the pacemaker and could cause her heart to not pace. She did not realize this and said that she will speak to them about it. SK

## 2018-07-16 NOTE — ED AVS SNAPSHOT
Emergency Department    [address] [facility] [address]    Phone:  [phone]    Fax:  [phone]                                       Helene Gibbs   MRN: [id number]    Department:   Emergency Department   Date of Visit:  7/16/2018           Patient Information     Date Of Birth          1935        Your diagnoses for this visit were:     Nausea, vomiting, and diarrhea     Likely gastroenteritis        You were seen by Casa Chinchilla MD.      Follow-up Information     Follow up with Neisha Esparza MD In 1 day.    Specialty:  Internal Medicine    Contact information:    [address] [id number] [phone]          Follow up with  Emergency Department.    Specialty:  EMERGENCY MEDICINE    Why:  If symptoms worsen    Contact information:    [address] [facility] [id number] [phone]      Discharge References/Attachments     FOOD POISONING OR GASTROENTERITIS (ADULT) (ENGLISH)    DIARRHEA, UNKNOWN CAUSE (ENGLISH)      Your next 10 appointments already scheduled     Jul 17, 2018 12:30 PM CDT   Office Visit with Neisha Esparza MD   [facility] ([facility])    [address] [place] [id number] [phone]           Bring a current list of meds and any records pertaining to this visit. For Physicals, please bring immunization records and any forms needing to be filled out. Please arrive 10 minutes early to complete paperwork.            Aug 06, 2018  2:00 PM CDT   Pacemaker Check with STONE DCR2   [facility] ([facility])    [address] [place] [id number] [phone] OPT 2            Aug 06, 2018  3:15 PM CDT   [facility] EP RETURN with Jc Juarez MD   [facility] ([facility])    [address] [place] [id number] [phone] OPT 2               24 Hour Appointment Hotline       To make an appointment at any [facility], call [phone] ([phone]). If you don't have a family doctor or clinic, we will help you find one. [facility] clinics are conveniently located to serve the needs of you and your family.             Review of your medicines      Our records show that you are taking the medicines listed below. If these are incorrect, please call your family doctor or clinic.        Dose / Directions Last dose taken    ACCU-CHEK SMARTVIEW test strip   Quantity:  200 strip   Generic drug:  blood glucose monitoring        USE 1 STRIP BY IN VITRO ROUTE 2 TIMES DAILY OR AS DIRECTED   Refills:  1        albuterol 108 (90 Base) MCG/ACT Inhaler   Commonly known as:  PROAIR HFA/PROVENTIL HFA/VENTOLIN HFA   Dose:  2 puff   Quantity:  1 Inhaler        Inhale 2 puffs into the lungs every 4 hours as needed for shortness of breath / dyspnea or wheezing   Refills:  5        apixaban ANTICOAGULANT 2.5 MG tablet   Commonly known as:  ELIQUIS   Dose:  2.5 mg   Quantity:  180 tablet        Take 1 tablet (2.5 mg) by mouth 2 times daily   Refills:  3        aspirin 81 MG EC tablet   Dose:  81 mg        Take 1 tablet (81 mg) by mouth daily   Refills:  0        fluticasone 50 MCG/ACT spray   Commonly known as:  FLONASE   Dose:  1-2 spray        Spray 1-2 sprays into both nostrils every evening   Refills:  0        furosemide 40 MG tablet   Commonly known as:  LASIX   Dose:  20 mg   Quantity:  90 tablet        Take 0.5 tablets (20 mg) by mouth daily   Refills:  1        * GLUCOTROL PO   Dose:  5 mg        Take 5 mg by mouth every morning (before breakfast)   Refills:  0        * glipiZIDE 5 MG tablet   Commonly known as:  GLUCOTROL   Quantity:  135 tablet        TAKE 1 TABLET BY MOUTH EVERY MORNING AND 0.5 TABLET EVERY EVENING   Refills:  1        hydrocortisone 2.5 % cream   Commonly known as:  ANUSOL-HC   Quantity:  30 g        Place rectally 2 times daily    Refills:  0        hydrOXYzine 25 MG capsule   Commonly known as:  VISTARIL   Dose:  25 mg   Quantity:  180 capsule        Take 1 capsule (25 mg) by mouth 2 times daily as needed for itching   Refills:  1        Lidocaine 4 % Patch   Commonly known as:  LIDOCARE   Dose:  1 patch   Quantity:  30 patch        Place 1 patch onto the skin every 24 hours   Refills:  1        lisinopril 10 MG tablet   Commonly known as:  PRINIVIL/ZESTRIL   Dose:  10 mg   Quantity:  180 tablet        Take 1 tablet (10 mg) by mouth 2 times daily   Refills:  1        magnesium hydroxide 400 MG/5ML suspension   Commonly known as:  MILK OF MAGNESIA   Dose:  30 mL        Take 30 mLs by mouth At Bedtime   Refills:  0        metoprolol tartrate 25 MG tablet   Commonly known as:  LOPRESSOR   Dose:  25 mg   Quantity:  180 tablet        Take 1 tablet (25 mg) by mouth 2 times daily   Refills:  3        ondansetron 4 MG ODT tab   Commonly known as:  ZOFRAN ODT   Dose:  4 mg   Quantity:  30 tablet        Take 1 tablet (4 mg) by mouth every 6 hours as needed for nausea   Refills:  1        oxyCODONE-acetaminophen 5-325 MG per tablet   Commonly known as:  PERCOCET   Dose:  1 tablet   Quantity:  40 tablet        Take 1 tablet by mouth 2 times daily as needed for severe pain   Refills:  0        PRESERVISION/LUTEIN Caps   Dose:  2 capsule        Take 2 capsules by mouth daily   Refills:  0        ranitidine 150 MG tablet   Commonly known as:  ZANTAC   Quantity:  180 tablet        TAKE ONE TABLET BY MOUTH TWO TIMES A DAY   Refills:  3        simvastatin 10 MG tablet   Commonly known as:  ZOCOR   Quantity:  90 tablet        TAKE 1 TABLET (10 MG) BY MOUTH AT BEDTIME   Refills:  2        tiotropium 18 MCG capsule   Commonly known as:  SPIRIVA   Dose:  18 mcg        Inhale 18 mcg into the lungs every evening   Refills:  0        traMADol 50 MG tablet   Commonly known as:  ULTRAM   Dose:  50 mg   Quantity:  60 tablet        Take 1 tablet (50 mg) by mouth every  6 hours as needed for severe pain   Refills:  1        * Notice:  This list has 2 medication(s) that are the same as other medications prescribed for you. Read the directions carefully, and ask your doctor or other care provider to review them with you.            Procedures and tests performed during your visit     CBC with platelets differential    Comprehensive metabolic panel    Draw and hold blood cultures    EKG 12-lead, tracing only    Lactic acid whole blood    Lipase    Nt probnp inpatient (BNP)    Peripheral IV catheter    Troponin I    UA with Microscopic    Urine Culture    XR Chest 2 Views      Orders Needing Specimen Collection     None      Pending Results     Date and Time Order Name Status Description    7/16/2018 1813 Urine Culture In process     7/16/2018 1715 XR Chest 2 Views Preliminary     7/16/2018 1715 EKG 12-lead, tracing only Preliminary             Pending Culture Results     Date and Time Order Name Status Description    7/16/2018 1813 Urine Culture In process             Pending Results Instructions     If you had any lab results that were not finalized at the time of your Discharge, you can call the ED Lab Result RN at [phone]. You will be contacted by this team for any positive Lab results or changes in treatment. The nurses are available 7 days a week from 10A to 6:30P.  You can leave a message 24 hours per day and they will return your call.        Test Results From Your Hospital Stay        7/16/2018  5:39 PM      Component Results     Component Value Ref Range & Units Status    WBC 6.3 4.0 - 11.0 10e9/L Final    RBC Count 3.94 3.8 - 5.2 10e12/L Final    Hemoglobin 12.5 11.7 - 15.7 g/dL Final    Hematocrit 37.4 35.0 - 47.0 % Final    MCV 95 78 - 100 fl Final    MCH 31.7 26.5 - 33.0 pg Final    MCHC 33.4 31.5 - 36.5 g/dL Final    RDW 17.7 (H) 10.0 - 15.0 % Final    Platelet Count 177 150 - 450 10e9/L Final    Diff Method Automated Method  Final    % Neutrophils 74.8 % Final    %  Lymphocytes 12.2 % Final    % Monocytes 9.2 % Final    % Eosinophils 3.3 % Final    % Basophils 0.3 % Final    % Immature Granulocytes 0.2 % Final    Nucleated RBCs 0 0 /100 Final    Absolute Neutrophil 4.7 1.6 - 8.3 10e9/L Final    Absolute Lymphocytes 0.8 0.8 - 5.3 10e9/L Final    Absolute Monocytes 0.6 0.0 - 1.3 10e9/L Final    Absolute Eosinophils 0.2 0.0 - 0.7 10e9/L Final    Absolute Basophils 0.0 0.0 - 0.2 10e9/L Final    Abs Immature Granulocytes 0.0 0 - 0.4 10e9/L Final    Absolute Nucleated RBC 0.0  Final         7/16/2018  5:49 PM      Component Results     Component Value Ref Range & Units Status    Sodium 133 133 - 144 mmol/L Final    Potassium 3.6 3.4 - 5.3 mmol/L Final    Chloride 94 94 - 109 mmol/L Final    Carbon Dioxide 29 20 - 32 mmol/L Final    Anion Gap 10 3 - 14 mmol/L Final    Glucose 204 (H) 70 - 99 mg/dL Final    Urea Nitrogen 18 7 - 30 mg/dL Final    Creatinine 1.14 (H) 0.52 - 1.04 mg/dL Final    GFR Estimate 46 (L) >60 mL/min/1.7m2 Final    Non  GFR Calc    GFR Estimate If Black 55 (L) >60 mL/min/1.7m2 Final    African American GFR Calc    Calcium 10.0 8.5 - 10.1 mg/dL Final    Bilirubin Total 1.1 0.2 - 1.3 mg/dL Final    Albumin 3.5 3.4 - 5.0 g/dL Final    Protein Total 7.6 6.8 - 8.8 g/dL Final    Alkaline Phosphatase 78 40 - 150 U/L Final    ALT 18 0 - 50 U/L Final    AST 14 0 - 45 U/L Final         7/16/2018  5:44 PM      Component Results     Component Value Ref Range & Units Status    Lipase 62 (L) 73 - 393 U/L Final         7/16/2018  5:31 PM      Component Results     Component Value Ref Range & Units Status    Lactic Acid 1.3 0.7 - 2.0 mmol/L Final         7/16/2018  5:49 PM      Component Results     Component Value Ref Range & Units Status    Troponin I ES <0.015 0.000 - 0.045 ug/L Final    The 99th percentile for upper reference range is 0.045 ug/L.  Troponin values   in the range of 0.045 - 0.120 ug/L may be associated with risks of adverse   clinical events.            7/16/2018  5:51 PM      Component Results     Component Value Ref Range & Units Status    Color Urine Yellow  Final    Appearance Urine Clear  Final    Glucose Urine Negative NEG^Negative mg/dL Final    Bilirubin Urine Negative NEG^Negative Final    Ketones Urine Negative NEG^Negative mg/dL Final    Specific Gravity Urine 1.015 1.003 - 1.035 Final    Blood Urine Trace (A) NEG^Negative Final    pH Urine 7.5 (H) 5.0 - 7.0 pH Final    Protein Albumin Urine 100 (A) NEG^Negative mg/dL Final    Urobilinogen mg/dL Normal 0.0 - 2.0 mg/dL Final    Nitrite Urine Negative NEG^Negative Final    Leukocyte Esterase Urine Trace (A) NEG^Negative Final    Source Midstream Urine  Final    WBC Urine 5 0 - 5 /HPF Final    RBC Urine 12 (H) 0 - 2 /HPF Final    Bacteria Urine Few (A) NEG^Negative /HPF Final    Squamous Epithelial /HPF Urine 6 (H) 0 - 1 /HPF Final    Mucous Urine Present (A) NEG^Negative /LPF Final    Hyaline Casts 12 (H) 0 - 2 /LPF Final         7/16/2018  5:49 PM      Component Results     Component Value Ref Range & Units Status    N-Terminal Pro BNP Inpatient 6532 (H) 0 - 1800 pg/mL Final       Reference range shown and results flagged as abnormal are suggested inpatient   cut points for confirming diagnosis if CHF in an acute setting. Establishing a   baseline value for each individual patient is useful for follow-up. An   inpatient or emergency department NT-proPBNP <300 pg/mL effectively rules out   acute CHF, with 99% negative predictive value.  The outpatient non-acute reference range for ruling out CHF is:   0-125 pg/mL (age 18 to less than 75)   0-450 pg/mL (age 75 yrs and older)           7/16/2018  6:18 PM      Narrative     XR CHEST TWO VIEWS   7/16/2018 6:13 PM     HISTORY: Cough.    COMPARISON: 5/22/2018.    FINDINGS: Left subclavian cardiac device in place. No pneumothorax.  The heart is enlarged. There is no pulmonary edema. There is  persistent infiltrate at the left lung base posteriorly,  similar in  appearance to the previous exam and described on CT as rounded  atelectasis. The lungs are otherwise clear. No pneumothorax or pleural  effusion. Previous low thoracic vertebroplasties.        Impression     IMPRESSION: No acute abnormality.         7/16/2018  6:26 PM                Clinical Quality Measure: Blood Pressure Screening     Your blood pressure was checked while you were in the emergency department today. The last reading we obtained was  BP: 145/79 . Please read the guidelines below about what these numbers mean and what you should do about them.  If your systolic blood pressure (the top number) is less than 120 and your diastolic blood pressure (the bottom number) is less than 80, then your blood pressure is normal. There is nothing more that you need to do about it.  If your systolic blood pressure (the top number) is 120-139 or your diastolic blood pressure (the bottom number) is 80-89, your blood pressure may be higher than it should be. You should have your blood pressure rechecked within a year by a primary care provider.  If your systolic blood pressure (the top number) is 140 or greater or your diastolic blood pressure (the bottom number) is 90 or greater, you may have high blood pressure. High blood pressure is treatable, but if left untreated over time it can put you at risk for heart attack, stroke, or kidney failure. You should have your blood pressure rechecked by a primary care provider within the next 4 weeks.  If your provider in the emergency department today gave you specific instructions to follow-up with your doctor or provider even sooner than that, you should follow that instruction and not wait for up to 4 weeks for your follow-up visit.        Thank you for choosing [facility]       Thank you for choosing [facility] for your care. Our goal is always to provide you with excellent care. Hearing back from our patients is one way we can continue to improve our services.  Please take a few minutes to complete the written survey that you may receive in the mail after you visit with us. Thank you!        [facility] [employer] Information     [employer] gives you secure access to your electronic health record. If you see a primary care provider, you can also send messages to your care team and make appointments. If you have questions, please call your primary care clinic.  If you do not have a primary care provider, please call [phone] and they will assist you.        Care EveryWhere ID     This is your Care EveryWhere ID. This could be used by other organizations to access your [facility] medical records  [id number]        Equal Access to Services     [facility] : Yariel Brady, jefe valentino, maribell castañeda, pamela mccullough . So [facility] [phone].    ATENCIÓN: Si habla español, tiene a gupta disposición servicios gratuitos de asistencia lingüística. Llame al [phone].    We comply with applicable federal civil rights laws and [place] laws. We do not discriminate on the basis of race, color, national origin, age, disability, sex, sexual orientation, or gender identity.            After Visit Summary       This is your record. Keep this with you and show to your community pharmacist(s) and doctor(s) at your next visit.

## 2018-07-16 NOTE — ED AVS SNAPSHOT
Emergency Department    [address] [place] [address]    Phone:  [phone]    Fax:  [phone]                                       Helene Gibbs   MRN: [id number]    Department:   Emergency Department   Date of Visit:  7/16/2018           After Visit Summary Signature Page     I have received my discharge instructions, and my questions have been answered. I have discussed any challenges I see with this plan with the nurse or doctor.    ..........................................................................................................................................  Patient/Patient Representative Signature      ..........................................................................................................................................  Patient Representative Print Name and Relationship to Patient    ..................................................               ................................................  Date                                            Time    ..........................................................................................................................................  Reviewed by Signature/Title    ...................................................              ..............................................  Date                                                            Time

## 2018-07-16 NOTE — ED PROVIDER NOTES
History     Chief Complaint:    Abdominal Discomfort      HPI   Helene Gibbs is an 83 year old female who presents to the ED today with abdominal discomfort. The patient states that she began to have this abdominal discomfort last night and was unable to sleep well because of it. She also reports to having some nausea and diarrhea since last night as well, stating that she has had about 4-5 episodes of diarrhea. She reports that she has not had any appetite and has had some difficulty breathing and a cough today, but denies any abdominal pain, fever, chest pain, dysuria, blood in her stools, or vomiting. She denies any recent travel, exposure to other sick people, or eating any foods that could have caused her symptoms. Of note, the patient states that she recent moved into a nursing home.       Allergies:  Ambien   Penicillins   Sulfa drugs   Cymbalta   Fluconazole      Medications:    Albuterol   Eliquis   Asprin   Flonase   Lasix   Glipzide   Vistaril   Lisinopril   Lopressor   Zofran   Percocet   Zantac   Zocor   Spiriva   Ultram      Past Medical History:    Anemia   Arthritis   Back pain   Bell's palsy   Breast Cancer   CKD   Colon polyps   CHF   COPD   CAD   Diabetes   GERD   Hyperlipidemia   Hypertension   Lumbar spine stenosis   Nicotine dependence   Obesity   Osteoporosis   Chronic pain   Pacemaker   Atrial fibrillation   Pleural effusion   PVD   Tobacco abuse   Tricuspid regurgitation     Past Surgical History:    Arthroscopy shoulder   Bone marrow biopsy   Ablation AV node   Implant pacemaker   Joint replacement knee   cholecystectomy   Lumpectomy breast   Intraocular lens implant     Family History:    Hypertension   Diabetes   CAD     Social History:  Marital Status:    Presents to the ED alone   Tobacco Use: former smoker   Alcohol Use: no   PCP: Neisha Esparza       Review of Systems   Gastrointestinal: Positive for diarrhea and nausea. Negative for abdominal pain, blood in stool and  vomiting.   Genitourinary: Negative for dysuria.   All other systems reviewed and are negative.      Physical Exam     Patient Vitals for the past 24 hrs:   BP Temp Temp src Pulse Heart Rate Resp SpO2   07/16/18 1845 - - - - 70 - 91 %   07/16/18 1830 145/79 - - - 70 - 94 %   07/16/18 1815 - - - - 70 - 96 %   07/16/18 1800 135/73 - - - 70 12 96 %   07/16/18 1751 - - - - 70 20 90 %   07/16/18 1743 - - - - 75 - 92 %   07/16/18 1741 133/84 - - - 70 18 -   07/16/18 1650 (!) 175/92 98  F (36.7  C) Oral 70 - 18 96 %        Physical Exam   Nursing note and vitals reviewed.  Constitutional:  Oriented to person, place, and time. Cooperative.   HENT:   Nose:    Nose normal.   Mouth/Throat:   Mucous membranes are normal.   Eyes:    Conjunctivae normal and EOM are normal.      Pupils are equal, round, and reactive to light.   Neck:    Trachea normal.   Cardiovascular:  Normal rate, regular rhythm, normal heart sounds and normal pulses. No murmur heard.  Pulmonary/Chest:  Effort normal and breath sounds normal.   Abdominal:   Soft. Normal appearance and bowel sounds are normal.      There is no tenderness to palpation at this time.       There is no rebound and no CVA tenderness.   Musculoskeletal:  Extremities atraumatic x 4.   Lymphadenopathy:  No cervical adenopathy.   Neurological:   Alert and oriented to person, place, and time. Normal strength.      No cranial nerve deficit or sensory deficit. GCS eye subscore is 4. GCS verbal subscore is 5. GCS motor subscore is 6.   Skin:    Skin is intact. No rash noted.   Psychiatric:   Normal mood and affect. Slightly anxious.       Emergency Department Course   ECG:  @ 1755  Indication: abdominal pain  Vent. Rate 70 bpm. AR interval * ms. QRS duration 150 ms. QT/QTc 442/477 ms. P-R-T axis * -73 93.   Ventricular paced rhythm. Abnormal ECG.  No significant change when compared to previous ECG from 5/22/18   Read @ 1800 by Dr. Chinchilla.     Imaging:  Xray chest, 2 views   No acute  abnormality.  Preliminary radiology read.     Radiographic findings were communicated with the patient who voiced understanding of the findings.    Laboratory:  BNP: 6532 (H)   CBC:  WBC 6.3, HGB 12.5,   CMP: glucose 204 (H), creatinine 1.14 (H), GFR estimate 46 (L), otherwise WNL   Lipase: 62 (L)   Lactic acid whole blood: 1.3   Troponin I: <0.015   UA: Clear yellow urine, blood trace, pH 7.5, protein albumin 100, leukocyte esterase trace, RBC 12 (H), bacteria few, squamous epithelial 6 (H), mucous present, hyaline casts 12 (H), otherwise WNL  Urine culture: pending      Interventions:  (1725) Zofran 4 mg, IV   (1740) Normal Saline, 500 mL, IV bolus       Emergency Department Course:  Nursing notes and vitals reviewed.  (1704) I performed an exam of the patient as documented above.    A peripheral IV was established. Blood was drawn from the patient. This was sent for laboratory testing, findings above.   EKG was done, interpretation as above.   Urine sample was obtained and sent for laboratory analysis, findings above.   The patient was sent for a chest xray while in the emergency department, findings above.   Findings and plan explained to the patient. Patient discharged home with instructions regarding supportive care, medications, and reasons to return. The importance of close follow-up was reviewed.   Impression & Plan    Medical Decision Making:  Helene Gibbs is an 83 year old female who presents with the and diarrhea as well as an upset stomach.  She has absolutely no abdominal pain or tenderness on exam.  She also has normal vital signs here other than a slightly high blood pressure.  That actually seemed to be her biggest concern.  I felt it was reasonable to check the above blood work, UA, as well as an EKG and chest x-ray.  Her workup here is unremarkable, although I am culturing the urine.  The patient understands that somebody will call her if the urine culture returns requiring management.   I suspect that she has a viral gastroenteritis.  I do not feel that stool cultures are warranted at this time though given the short duration of her symptoms.  She understands however that she may require stool cultures if her symptoms persist for another 2-3 days.  She feels comfortable going home, and I think that is reasonable.  She has Zofran at home that she can use.  I instructed her to follow-up with her own physician tomorrow as previously scheduled and certainly return though with any concerns or worsening symptoms.    Diagnosis:    ICD-10-CM    1. Nausea, vomiting, and diarrhea R11.2     R19.7    2. Likely gastroenteritis K52.9        Disposition:  discharged to home      I, Elzbieta Rosas, am serving as a scribe on 7/16/2018 at 5:04 PM to personally document services performed by Dr. Chinchilla based on my observations and the provider's statements to me.   7/16/2018    EMERGENCY DEPARTMENT       Casa Chinchilla MD  07/16/18 7755

## 2018-07-16 NOTE — TELEPHONE ENCOUNTER
Reason for call:  Patient reporting a symptom    Symptom or request: poss diarhea/ upset stomach/ nauseated/ BP high    Duration (how long have symptoms been present): 1 day    Have you been treated for this before? no    Additional comments: pt has appt with Dr Esparza tomorrow but doesn't know if she should come in/ please call to advise    Phone Number patient can be reached at:  Home number on file [phone] (home)    Best Time:  anytime    Can we leave a detailed message on this number:  NO    Call taken on 7/16/2018 at 3:30 PM by Jamshid Barrientos

## 2018-07-16 NOTE — TELEPHONE ENCOUNTER
Noted. Will route to PCP as FYI that pt in ER.  Has appt currently scheduled tomorrow, may need to be cancelled if pt inpt.  Brandi Hinojosa RN

## 2018-07-17 PROBLEM — D76.3: Status: RESOLVED | Noted: 2018-01-01 | Resolved: 2018-01-01

## 2018-07-17 PROBLEM — D76.3: Status: ACTIVE | Noted: 2018-01-01

## 2018-07-17 NOTE — PATIENT INSTRUCTIONS
Stay well hydrated .  Continue present treatment.  I am expecting slow gradual improvement.  Notify me if symptoms does not improve or get worse.  Follow up as needed

## 2018-07-17 NOTE — PROGRESS NOTES
SUBJECTIVE:   Helene Gibbs is a 83 year old female who presents to clinic today for the following health issues:      ED/UC Followup:    Facility:   ED  Date of visit: 07/16/18  Reason for visit: abdominal pain  Current Status: no vomiting, can't swallow, has to urinate a lot, no appetite, diarrhea       Patient has pelvic floor dysfunction.  She had extensive workup done  As it is considered a major surgery she decided not to pursue that  She has been dealing with chronic issue with the constipation and she takes milk of magnesia then she ends up with diarrhea  Patient reports that her bowel movements are hard to describe  It has altered shape.  She does not want to stop taking her milk of magnesia as difficult to manage with the constipation then diarrhea.  She is not interested in going back to the pelvic floor clinic    She was seen in emergency room and slowly and gradually her symptoms are improving  She had some difficulty swallowing liquids also but when she had coffee and a warm liquid it went without any problem  Her appetite is is still not good but she is making slow gradual progress    She does not want to take oxycodone or Norco she is taking tramadol which she likes better than the other ones  She has moved to the new facility  This is not an assisted living but it is a independent living for seniors but she can move to the assisted living if needed.  She likes this place better than the old place      Problem list and histories reviewed & adjusted, as indicated.  Additional history: as documented    Medications and Labs reviewed in EPIC    Reviewed and updated as needed this visit by clinical staff  Tobacco  Allergies  Meds  Problems       Reviewed and updated as needed this visit by Provider       ROS:  Constitutional, HEENT, cardiovascular, pulmonary, GI, , musculoskeletal, neuro, skin, endocrine and psych systems are negative, except as otherwise noted.  Labs in emergency room note  "was reviewed and discussed with the patient  OBJECTIVE:     /90 (BP Location: Left arm, Cuff Size: Adult Large)  Pulse 69  Temp 97  F (36.1  C) (Oral)  Ht 5' 6\" (1.676 m)  Wt 159 lb (72.1 kg)  LMP  (LMP Unknown)  SpO2 96%  BMI 25.66 kg/m2  Body mass index is 25.66 kg/(m^2).      GENERAL APPEARANCE: healthy, alert and no distress  EYES: Eyes grossly normal to inspection, PERRL and conjunctivae and sclerae normal  HENT: ear canals and TM's normal and nose and mouth without ulcers or lesions  NECK: no adenopathy  RESP: lungs clear to auscultation - no rales, rhonchi or wheezes  CV: regular rates and rhythm, normal S1 S2, no S3    She walks with the help of walker  ASSESSMENT/PLAN:     Helene was seen today for hospital f/u.    Diagnoses and all orders for this visit:    Type 2 diabetes mellitus with diabetic nephropathy, without long-term current use of insulin (H)  Sugars were high yesterday but overall under good control    Chronic obstructive pulmonary disease, unspecified COPD type (H)  -     tiotropium (SPIRIVA) 18 MCG capsule; Inhale 1 capsule (18 mcg) into the lungs every evening  She requested refill of this medication    (HFpEF) heart failure with preserved ejection fraction (H)  She is on lisinopril as well as diuretic  There is well compensated  BNP in the emergency room was elevated  But her x-ray did not show any pulmonary edema  On clinical exam also she is does not appear fluid overloaded    Necrobiotic xanthogranuloma (H)  She  has past history but it has resolved now    Gastroenteritis:  Patient presented to ED yesterday with abdominal discomfort, nausea, and diarrhea  She was treated and discharged to home  Workup was done, which was normal  Waiting on urine culture results  Advised patient to stay well-hydrated  If she continued to notice trouble with swallowing food and liquids then she should let me know and we would consider getting endoscopy done.  But she mentioned now symptoms are " slowly improving so I am expecting improvement  She had endoscopy done not too long ago I reviewed that with her  I answered her questions      Face-to-face encounter:  Due to multiple chronic medical conditions including COPD, CHF and chronic pain osteoarthritis and back pain  She will benefit with home health  Skilled nursing, PT OT would be very beneficial    Patient Instructions   Stay well hydrated .  Continue present treatment.  I am expecting slow gradual improvement.  Notify me if symptoms does not improve or get worse.  Follow up as needed      Neisha Esparza MD  [facility]

## 2018-07-17 NOTE — PROGRESS NOTES
Clinic Care Coordination Contact  [facility]/Voicemail       Clinical Data: Care Coordinator Outreach  Outreach attempted x 1.  Left message on voicemail with call back information and requested return call.  Plan: Care Coordinator will try to reach patient again in 3-5 business days.    Tomasa Wagner RN  Clinic Care Coordinator  [phone]  [email or web address]

## 2018-07-17 NOTE — MR AVS SNAPSHOT
After Visit Summary   7/17/2018    Helene Gibbs    MRN: [id number]           Patient Information     Date Of Birth          1935        Visit Information        Provider Department      7/17/2018 12:30 PM Neisha Esparza MD [facility]        Today's Diagnoses     Type 2 diabetes mellitus with diabetic nephropathy, without long-term current use of insulin (H)    -  1    Chronic obstructive pulmonary disease, unspecified COPD type (H)        (HFpEF) heart failure with preserved ejection fraction (H)          Care Instructions    Stay well hydrated .  Continue present treatment.  I am expecting slow gradual improvement.  Notify me if symptoms does not improve or get worse.  Follow up as needed          Follow-ups after your visit        Your next 10 appointments already scheduled     Aug 06, 2018  2:00 PM CDT   Pacemaker Check with [facility]   [facility] ([email or web address])    [address] [place] [id number] [phone] OPT 2            Aug 06, 2018  3:15 PM CDT   [email or web address] EP RETURN with Jc Juarez MD   [facility] ([email or web address])    [address] [place] [id number] [phone] OPT 2              Who to contact     If you have questions or need follow up information about today's clinic visit or your schedule please contact [facility] directly at [phone].  Normal or non-critical lab and imaging results will be communicated to you by MyChart, letter or phone within 4 business days after the clinic has received the results. If you do not hear from us within 7 days, please contact the clinic through MyChart or phone. If you have a critical or abnormal lab result, we will notify you by phone as soon as possible.  Submit refill requests through GitHub or call your pharmacy and they will forward the refill request to us. Please  "allow 3 business days for your refill to be completed.          Additional Information About Your Visit        MyChart Information     [employer] gives you secure access to your electronic health record. If you see a primary care provider, you can also send messages to your care team and make appointments. If you have questions, please call your primary care clinic.  If you do not have a primary care provider, please call [phone] and they will assist you.        Care EveryWhere ID     This is your Care EveryWhere ID. This could be used by other organizations to access your [facility] medical records  [id number]        Your Vitals Were     Pulse Temperature Height Last Period Pulse Oximetry BMI (Body Mass Index)    69 97  F (36.1  C) (Oral) 5' 6\" (1.676 m) (LMP Unknown) 96% 25.66 kg/m2       Blood Pressure from Last 3 Encounters:   07/17/18 157/90   07/16/18 145/79   06/20/18 124/64    Weight from Last 3 Encounters:   07/17/18 159 lb (72.1 kg)   06/20/18 162 lb (73.5 kg)   06/01/18 154 lb (69.9 kg)              Today, you had the following     No orders found for display         Today's Medication Changes          These changes are accurate as of 7/17/18  1:00 PM.  If you have any questions, ask your nurse or doctor.               Stop taking these medicines if you haven't already. Please contact your care team if you have questions.     oxyCODONE-acetaminophen 5-325 MG per tablet   Commonly known as:  PERCOCET   Stopped by:  Neisha Esparza MD                Where to get your medicines      These medications were sent to [facility] - [address], [address]  [address], [place] [address]     Phone:  [phone]     tiotropium 18 MCG capsule                Primary Care Provider Office Phone # Fax #    Neisha Esparza [phone] [address]  [place] [address]        Goals        Diet    Have 3 meals a day  "    Notes - Note created  5/23/2018 10:20 AM by Crystal Burciaga, WILBERT    Goal Statement: I will try to eat 3 small meals a day   Measure of Success: PCP will continue to weigh the patient at her appointments and monitor for increased weight loss.   Supportive Steps to Achieve: Education on the importance of eating 3 meals day.   Barriers: Loss of appetite stated by the patient.   Strengths: Patient moving to [facility] in July with meals included.   Date to Achieve By: August 1, 2018          General    Healthy Eating (pt-stated)     Pain Management (pt-stated)     Pain Management (pt-stated)     Notes - Note created  5/23/2018 10:17 AM by Crystal Burciaga, WILBERT    Goal Statement: I will continue to take my pain medication as prescribed. I will call my physician if the pain gets worse.   Measure of Success: Patient will stay out of the ED. Patient will call her PCP if needed. RN CC will continue to call the patient to assess how her pain is.   Supportive Steps to Achieve: RN CC will reach out monthly to assess pain. Patient will continue to keep her appointments.   Strengths: Good PCP support. Appointment scheduled with [facility].   Date to Achieve By: August 1, 2018      start date: 4/17/14 I will weigh myself daily and if any weight gain or shortness of breath I will call the clinic. (pt-stated)     Notes - Note created  4/17/2014  3:59 PM by Margareth Pichardo, RN    As of today's date 4/17/2014 goal is met at 0 - 25%.   Goal Status:  Active        Equal Access to Services     [facility] AH: Hadii sukhdeep mercer hadbeatriceo Sojovanyali, waaxda luqadaha, qaybta kaalmada adeegyada, pamela mccullough ah. So [facility] [phone].    ATENCIÓN: Si habla español, tiene a gupta disposición servicios gratuitos de asistencia lingüística. Llame al [phone].    We comply with applicable federal civil rights laws and [place] laws. We do not discriminate on the basis of race, color, national origin, age, disability, sex, sexual  orientation, or gender identity.            Thank you!     Thank you for choosing [facility]  for your care. Our goal is always to provide you with excellent care. Hearing back from our patients is one way we can continue to improve our services. Please take a few minutes to complete the written survey that you may receive in the mail after your visit with us. Thank you!             Your Updated Medication List - Protect others around you: Learn how to safely use, store and throw away your medicines at www.disposemymeds.org.          This list is accurate as of 7/17/18  1:00 PM.  Always use your most recent med list.                   Brand Name Dispense Instructions for use Diagnosis    ACCU-CHEK SMARTVIEW test strip   Generic drug:  blood glucose monitoring     200 strip    USE 1 STRIP BY IN VITRO ROUTE 2 TIMES DAILY OR AS DIRECTED    Type 2 diabetes mellitus with diabetic nephropathy (H)       albuterol 108 (90 Base) MCG/ACT Inhaler    PROAIR HFA/PROVENTIL HFA/VENTOLIN HFA    1 Inhaler    Inhale 2 puffs into the lungs every 4 hours as needed for shortness of breath / dyspnea or wheezing    COPD (chronic obstructive pulmonary disease) (H)       apixaban ANTICOAGULANT 2.5 MG tablet    ELIQUIS    180 tablet    Take 1 tablet (2.5 mg) by mouth 2 times daily    Atrial fibrillation, unspecified type (H)       aspirin 81 MG EC tablet      Take 1 tablet (81 mg) by mouth daily    NSTEMI (non-ST elevated myocardial infarction) (H)       fluticasone 50 MCG/ACT spray    FLONASE     Spray 1-2 sprays into both nostrils every evening        furosemide 40 MG tablet    LASIX    90 tablet    Take 0.5 tablets (20 mg) by mouth daily    (HFpEF) heart failure with preserved ejection fraction (H)       * GLUCOTROL PO      Take 5 mg by mouth every morning (before breakfast)        * glipiZIDE 5 MG tablet    GLUCOTROL    135 tablet    TAKE 1 TABLET BY MOUTH EVERY MORNING AND 0.5 TABLET EVERY EVENING    Type 2 diabetes mellitus  with diabetic nephropathy, without long-term current use of insulin (H)       hydrocortisone 2.5 % cream    ANUSOL-HC    30 g    Place rectally 2 times daily    External hemorrhoids       hydrOXYzine 25 MG capsule    VISTARIL    180 capsule    Take 1 capsule (25 mg) by mouth 2 times daily as needed for itching    Itching       Lidocaine 4 % Patch    LIDOCARE    30 patch    Place 1 patch onto the skin every 24 hours    Chronic back pain, unspecified back location, unspecified back pain laterality       lisinopril 10 MG tablet    PRINIVIL/ZESTRIL    180 tablet    Take 1 tablet (10 mg) by mouth 2 times daily    NSTEMI (non-ST elevated myocardial infarction) (H)       magnesium hydroxide 400 MG/5ML suspension    MILK OF MAGNESIA     Take 30 mLs by mouth At Bedtime        metoprolol tartrate 25 MG tablet    LOPRESSOR    180 tablet    Take 1 tablet (25 mg) by mouth 2 times daily    NSTEMI (non-ST elevated myocardial infarction) (H)       ondansetron 4 MG ODT tab    ZOFRAN ODT    30 tablet    Take 1 tablet (4 mg) by mouth every 6 hours as needed for nausea    Nausea       PRESERVISION/LUTEIN Caps      Take 2 capsules by mouth daily        ranitidine 150 MG tablet    ZANTAC    180 tablet    TAKE ONE TABLET BY MOUTH TWO TIMES A DAY    Dyspepsia       simvastatin 10 MG tablet    ZOCOR    90 tablet    TAKE 1 TABLET (10 MG) BY MOUTH AT BEDTIME    Type 2 diabetes mellitus with diabetic nephropathy, without long-term current use of insulin (H)       tiotropium 18 MCG capsule    SPIRIVA    30 capsule    Inhale 1 capsule (18 mcg) into the lungs every evening    Chronic obstructive pulmonary disease, unspecified COPD type (H)       traMADol 50 MG tablet    ULTRAM    60 tablet    Take 1 tablet (50 mg) by mouth every 6 hours as needed for severe pain    Intractable back pain       * Notice:  This list has 2 medication(s) that are the same as other medications prescribed for you. Read the directions carefully, and ask your doctor or  other care provider to review them with you.

## 2018-07-18 NOTE — DISCHARGE INSTRUCTIONS
"   * VOMITING [6yr-Adult]  Vomiting is a common symptom that may be due to different causes. These include gastroenteritis (\"stomach-flu\"), food poisoning and gastritis. There are other more serious causes of vomiting which may be hard to diagnose early in the illness. Therefore, it is important to watch for the warning signs listed below.  The main danger from repeated vomiting is \"dehydration\". This is due to excess loss of water and minerals from the body. When this occurs, body fluids must be replaced.`  HOME CARE:      If symptoms are severe, rest at home for the next 24 hours.    You may use acetaminophen (Tylenol) 650-1000 mg every 6 hours to control fever, unless another medicine was prescribed. [ NOTE : If you have chronic liver disease, talk with your doctor before using acetaminophen.] (Aspirin should never be used in anyone under 18 years of age who is ill with a fever. It may cause severe liver damage.)    Avoid tobacco and alcohol use, which may worsen your symptoms.    If medicines for vomiting were prescribed, take as directed.  DURING THE FIRST 12-24 HOURS follow the diet below. Try to take frequent small sips even if you vomit occasionally:    FRUIT JUICES: Apple, grape juice, clear fruit drinks, electrolyte replacement and sports drinks.    BEVERAGES: Sport drinks such as Gatorade, soft drinks without caffeine; mineral water (plain or flavored), decaffeinated tea and coffee.    SOUPS: Clear broth, consommé and bouillon    DESSERTS: Plain gelatin (Jell-O), popsicles and fruit juice bars.  DURING THE NEXT 24 HOURS you may add the following to the above:    Hot cereal, plain toast, bread, rolls, crackers    Plain noodles, rice, mashed potatoes, chicken noodle or rice soup    Unsweetened canned fruit (avoid pineapple), bananas    Avoid dairy products    Limit caffeine and chocolate. No spices or seasonings except salt.  DURING THE NEXT 24 HOURS  Slowly go back to a normal diet, as you feel better and " your symptoms lessen.  FOLLOW UP with your doctor as advised if you are not improving over the next 2-3 days.  GET PROMPT MEDICAL ATTENTION if any of the following occur:    Constant abdominal pain that stays in the same spot or gets worse    Continued vomiting (unable to keep liquids down) for 24 hours    Frequent diarrhea (more than 5 times a day); blood (red or black color) in diarrhea    No urine output for 12 hours or extreme thirst    Weakness, dizziness or fainting    Unusually drowsy or confused    Fever over 101.0  F (38.3  C) for more than 3 days    Yellow color of the eyes or skin    0188-3647 [employer]. [address] [place] [address]. All rights reserved. This information is not intended as a substitute for professional medical care. Always follow your healthcare professional's instructions.  This information has been modified by your health care provider with permission from the publisher.          Discharge Instructions for Hyponatremia  You were diagnosed with hyponatremia, which means your blood level of sodium (salt) is too low. Salt is needed for the body and brain to work. Very low blood levels of sodium can be fatal. Symptoms can include headache, confusion, fatigue, muscle cramps, hallucinations, seizures, and coma. You have been treated to raise your blood levels of sodium. These instructions will help you care for yourself at home as you have been instructed.  Home care    Limit your intake of fluids. Drink only the amounts directed by your healthcare provider.    Ask your healthcare provider what you should use to replace fluids if you are throwing up.    Keep all follow-up appointments. Your provider needs to watch your condition closely.  To help prevent hyponatremia:    Take all medicines exactly as directed. Certain medicines can lower blood sodium levels.    If you have done something that makes you sweat a lot, drink fluids that contain salt and other  electrolytes.     Tell all healthcare providers what medicines you take. Mention all prescription and over-the-counter drugs and herbs.    Have your sodium levels checked often. This is vital if you take a diuretic (medicine that helps your body get rid of water).  Follow-up  Follow up with your healthcare provider, or as advised.   When to call your healthcare provider  Call your provider right away if you have any of the following:    Severe tiredness    Fainting    Dizziness    Loss of appetite    Nausea or vomiting    Confusion or forgetfulness    Muscle spasms, cramping, or twitching    Seizures    Gait disturbances   Date Last Reviewed: 6/1/2017 2000-2017 [employer]. [address] [place] [address]. All rights reserved. This information is not intended as a substitute for professional medical care. Always follow your healthcare professional's instructions.

## 2018-07-18 NOTE — ED AVS SNAPSHOT
Emergency Department    [address] [place] [id number]    Phone:  [phone]    Fax:  [phone]                                       Helene Gibbs   MRN: [id number]    Department:   Emergency Department   Date of Visit:  7/18/2018           After Visit Summary Signature Page     I have received my discharge instructions, and my questions have been answered. I have discussed any challenges I see with this plan with the nurse or doctor.    ..........................................................................................................................................  Patient/Patient Representative Signature      ..........................................................................................................................................  Patient Representative Print Name and Relationship to Patient    ..................................................               ................................................  Date                                            Time    ..........................................................................................................................................  Reviewed by Signature/Title    ...................................................              ..............................................  Date                                                            Time

## 2018-07-18 NOTE — ED PROVIDER NOTES
History     Chief Complaint:  Abdominal Pain    HPI   Helene Gibbs is a 83 year old female with a history of hypertension, diabetes, previous breast cancer, CKD, chronic pain, s/p pacemaker who presents to the emergency department today for evaluation of abdominal pain. Per chart review, the patient was recently seen on 7/16 for similar abdominal pain with associated nausea, diarrhea, and decreased appetite. Labs and imaging were obtained as noted below significant for an elevated BNP. She felt improved after Zofran and IV fluids and was discharged home with Zofran and plans to follow up in clinic. Since discharge, the patient went into her clinic yesterday who told her that it is gastritis and all of the labs have been performed. She notes that she has been unable to eat due to decreased appetite. She has forced herself to eat, but has been able to keep this down. She has not been taking her prescribed Zofran. Her diarrhea has persisted with 5-6 daily episodes. Additionally, she states her blood pressure and blood sugar has been high as well. She was concerned about the persistence of these symptoms prompting her visit to the emergency department. She denies bloody stools, chest pain, and recent antibiotics. Of note, the patient recently moved into a senior care facility about a week ago, but does not know about any sick contacts.     Imaging (7/16/18):  Xray chest, 2 views   No acute abnormality.  Preliminary radiology read.     Radiographic findings were communicated with the patient who voiced understanding of the findings.     Laboratory (7/16/18):  BNP: 6532 (H)   CBC:  WBC 6.3, HGB 12.5,   CMP: glucose 204 (H), creatinine 1.14 (H), GFR estimate 46 (L), otherwise WNL   Lipase: 62 (L)   Lactic acid whole blood: 1.3   Troponin I: <0.015   UA: Clear yellow urine, blood trace, pH 7.5, protein albumin 100, leukocyte esterase trace, RBC 12 (H), bacteria few, squamous epithelial 6 (H), mucous present,  hyaline casts 12 (H), otherwise WNL    Urine culture: No growth today    Allergies:  Ambien   Penicillins   Sulfa drugs   Cymbalta   Fluconazole       Medications:    Albuterol   Eliquis   Asprin   Flonase   Lasix   Glipzide   Vistaril   Lisinopril   Lopressor   Zofran   Percocet   Zantac   Zocor   Spiriva   Ultram       Past Medical History:    Anemia   Arthritis   Back pain   Bell's palsy   Breast Cancer   CKD   Colon polyps   CHF   COPD   CAD   Diabetes   GERD   Hyperlipidemia   Hypertension   Lumbar spine stenosis   Nicotine dependence   Obesity   Osteoporosis   Chronic pain   Pacemaker   Atrial fibrillation   Pleural effusion   PVD   Tobacco abuse   Tricuspid regurgitation      Past Surgical History:    Arthroscopy shoulder   Bone marrow biopsy   Ablation AV node   Implant pacemaker   Joint replacement knee   cholecystectomy   Lumpectomy breast   Intraocular lens implant      Family History:    Hypertension   Diabetes   CAD      Social History:  Marital Status:    Presents to the ED alone   Tobacco Use: former smoker   Alcohol Use: no   PCP: Neisha Esparza     Review of Systems   Constitutional: Positive for appetite change. Negative for chills and fever.   Cardiovascular: Negative for chest pain.   Gastrointestinal: Positive for abdominal pain, diarrhea and nausea. Negative for blood in stool and vomiting.   All other systems reviewed and are negative.    Physical Exam     Patient Vitals for the past 24 hrs:   BP Temp Temp src Heart Rate Resp SpO2 Weight   07/18/18 1456 160/78 - - 71 - - -   07/18/18 1355 145/77 - - 79 16 97 % -   07/18/18 1055 187/86 97.8  F (36.6  C) Oral 70 18 97 % 76.7 kg (169 lb)         Physical Exam  General: Alert, interactive in mild distress  Head:  Scalp is atraumatic  Eyes:  The pupils are equal, round, and reactive to light    EOM's intact    No scleral icterus  ENT:      Nose:  The external nose is normal  Ears:  External ears are normal  Mouth/Throat: The oropharynx is  normal    Mucus membranes are dry      Neck:  Normal range of motion.      There is no rigidity.    Trachea is in the midline         CV:  Regular rate and rhythm    No murmur   Resp:  Breath sounds are clear bilaterally    Non-labored, no retractions or accessory muscle use      GI:  Abdomen is soft, no distension, no tenderness.       MS:  Normal strength in all 4 extremities  Skin:  Warm and dry, No rash or lesions noted.  Neuro: Strength 5/5 x4.   Psych:  Awake. Alert.  Normal affect.      Appropriate interactions.      Emergency Department Course   Imaging:  Radiology findings were communicated with the patient who voiced understanding of the findings.  XR Abdomen 2 views  IMPRESSION: Gas-filled loops of distended bowel are seen. No free air.  Report per radiology     CT Abdomen Pelvis w Contrast  IMPRESSION:   1. No acute abnormality in the abdomen or pelvis. No cause for  abdominal pain is identified.  2. Prior cholecystectomy.  3. Cardiac enlargement.    4. Consolidation at the left lung base posteriorly is partially  included on this exam but does not appear significantly changed where  visualized compared to 5/22/2018. This finding is nonspecific and  could represent chronic atelectasis, although a pneumonia in this  location cannot entirely be excluded. Please clinically correlate.  Report per radiology     Laboratory:  Laboratory findings were communicated with the patient who voiced understanding of the findings.  CBC: WNL. (WBC 7.9, HGB 13.0, )   CMP: AWNL (Creatinine 1.11 (H),  (L), glucose 167 (H), chloride 93 (L), GFR estimate 47 (L), calcium 10.3 (H) o/w WNL  Lipase: 78    Interventions:  1231 NS Bolus 1,000mL IV  1232 Pepcid 20 mg IV  1235 Reglan 5 mg IV  1237 Sucralfate 1 g IV  1254 Ativan 0.5 mg PO    Emergency Department Course:  Nursing notes and vitals reviewed.  IV was inserted and blood was drawn for laboratory testing, results above.  The patient was sent for a XR Abdomen 2  views and CT Abdomen Pelvis w Contrast while in the emergency department, results above.   1140: I performed an exam of the patient as documented above.   1408: Patient rechecked and updated. She is feeling better and she is going to try eating.   1430: Patient rechecked and updated.   Findings and plan explained to the Patient. Patient discharged home with instructions regarding supportive care, medications, and reasons to return. The importance of close follow-up was reviewed. The patient was prescribed Reglan and Carafate.   I personally reviewed the laboratory and imaging results with the Patient and answered all related questions prior to discharge.    Impression & Plan    Medical Decision Making:  Helene Gibbs is a 83 year old female who was seen and evaluated. Previous records from two days ago were reviewed. The above work up was performed here returning largely as unremarkable other than a mild hyponatremia which I think is likely secondary to mild dehydration because she is not eating. She received the above medications with improvement in her symptoms and was able to tolerate PO intake here and was subsequently discharged to home. She will follow up with her PCP. I have prescribed Reglan and Carafate and I have recommended return if new symptoms develop. I have recommended potential follow up with a gastroenterologist. Patient was in agreement with this plan and was subsequently discharged to home. There are no signs of sepsis syndrome, acute coronary syndrome, or acute intraabdominal catastrophe.     Diagnosis:    ICD-10-CM    1. Nausea R11.0    2. Hyponatremia E87.1    3. CKD (chronic kidney disease) stage 3, GFR 30-59 ml/min N18.3        Disposition:  discharged to home as noted above    Discharge Medications:      Details   metoclopramide (REGLAN) 5 MG tablet Take 1 tablet (5 mg) by mouth 3 times daily as needed, Disp-20 tablet, R-0, Local Print      sucralfate (CARAFATE) 1 GM/10ML suspension Take  10 mLs (1 g) by mouth 4 times daily, Disp-420 mL, R-1, Local Print       Scribe Disclosure:  I, Tomás Pastrana, am serving as a scribe at 11:21 AM on 7/18/2018 to document services personally performed by Duran Bishop MD based on my observations and the provider's statements to me.     7/18/2018    EMERGENCY DEPARTMENT       Duran Bishop MD  07/18/18 0687

## 2018-07-18 NOTE — ED AVS SNAPSHOT
"  Emergency Department    [address] [facility] [address]    Phone:  [phone]    Fax:  [phone]                                       Helene Gibbs   MRN: [id number]    Department:   Emergency Department   Date of Visit:  7/18/2018           Patient Information     Date Of Birth          1935        Your diagnoses for this visit were:     Nausea     Hyponatremia     CKD (chronic kidney disease) stage 3, GFR 30-59 ml/min        You were seen by Duran Bishop MD.      Follow-up Information     Follow up with Neisha Esparza MD In 1 week.    Specialty:  Internal Medicine    Contact information:    [address] [address] [phone]          Follow up with  Emergency Department.    Specialty:  EMERGENCY MEDICINE    Why:  As needed, If symptoms worsen    Contact information:    [address] [facility] [address] [phone]        Discharge Instructions          * VOMITING [6yr-Adult]  Vomiting is a common symptom that may be due to different causes. These include gastroenteritis (\"stomach-flu\"), food poisoning and gastritis. There are other more serious causes of vomiting which may be hard to diagnose early in the illness. Therefore, it is important to watch for the warning signs listed below.  The main danger from repeated vomiting is \"dehydration\". This is due to excess loss of water and minerals from the body. When this occurs, body fluids must be replaced.`  HOME CARE:      If symptoms are severe, rest at home for the next 24 hours.    You may use acetaminophen (Tylenol) 650-1000 mg every 6 hours to control fever, unless another medicine was prescribed. [ NOTE : If you have chronic liver disease, talk with your doctor before using acetaminophen.] (Aspirin should never be used in anyone under 18 years of age who is ill with a fever. It may cause severe liver damage.)    Avoid tobacco and alcohol use, which may " worsen your symptoms.    If medicines for vomiting were prescribed, take as directed.  DURING THE FIRST 12-24 HOURS follow the diet below. Try to take frequent small sips even if you vomit occasionally:    FRUIT JUICES: Apple, grape juice, clear fruit drinks, electrolyte replacement and sports drinks.    BEVERAGES: Sport drinks such as Gatorade, soft drinks without caffeine; mineral water (plain or flavored), decaffeinated tea and coffee.    SOUPS: Clear broth, consommé and bouillon    DESSERTS: Plain gelatin (Jell-O), popsicles and fruit juice bars.  DURING THE NEXT 24 HOURS you may add the following to the above:    Hot cereal, plain toast, bread, rolls, crackers    Plain noodles, rice, mashed potatoes, chicken noodle or rice soup    Unsweetened canned fruit (avoid pineapple), bananas    Avoid dairy products    Limit caffeine and chocolate. No spices or seasonings except salt.  DURING THE NEXT 24 HOURS  Slowly go back to a normal diet, as you feel better and your symptoms lessen.  FOLLOW UP with your doctor as advised if you are not improving over the next 2-3 days.  GET PROMPT MEDICAL ATTENTION if any of the following occur:    Constant abdominal pain that stays in the same spot or gets worse    Continued vomiting (unable to keep liquids down) for 24 hours    Frequent diarrhea (more than 5 times a day); blood (red or black color) in diarrhea    No urine output for 12 hours or extreme thirst    Weakness, dizziness or fainting    Unusually drowsy or confused    Fever over 101.0  F (38.3  C) for more than 3 days    Yellow color of the eyes or skin    5811-3064 [employer]. [address] [place] [address]07. All rights reserved. This information is not intended as a substitute for professional medical care. Always follow your healthcare professional's instructions.  This information has been modified by your health care provider with permission from the publisher.          Discharge Instructions  for Hyponatremia  You were diagnosed with hyponatremia, which means your blood level of sodium (salt) is too low. Salt is needed for the body and brain to work. Very low blood levels of sodium can be fatal. Symptoms can include headache, confusion, fatigue, muscle cramps, hallucinations, seizures, and coma. You have been treated to raise your blood levels of sodium. These instructions will help you care for yourself at home as you have been instructed.  Home care    Limit your intake of fluids. Drink only the amounts directed by your healthcare provider.    Ask your healthcare provider what you should use to replace fluids if you are throwing up.    Keep all follow-up appointments. Your provider needs to watch your condition closely.  To help prevent hyponatremia:    Take all medicines exactly as directed. Certain medicines can lower blood sodium levels.    If you have done something that makes you sweat a lot, drink fluids that contain salt and other electrolytes.     Tell all healthcare providers what medicines you take. Mention all prescription and over-the-counter drugs and herbs.    Have your sodium levels checked often. This is vital if you take a diuretic (medicine that helps your body get rid of water).  Follow-up  Follow up with your healthcare provider, or as advised.   When to call your healthcare provider  Call your provider right away if you have any of the following:    Severe tiredness    Fainting    Dizziness    Loss of appetite    Nausea or vomiting    Confusion or forgetfulness    Muscle spasms, cramping, or twitching    Seizures    Gait disturbances   Date Last Reviewed: 6/1/2017 2000-2017 [employer]. [address] [place] [address]208. All rights reserved. This information is not intended as a substitute for professional medical care. Always follow your healthcare professional's instructions.          Your next 10 appointments already scheduled     Aug 06, 2018  2:00 PM  CDT   Pacemaker Check with [facility] D[facility]   [facility]    [address]  [facility] [address] [phone] OPT 2            Aug 06, 2018  3:15 PM CDT   [facility] EP RETURN with Jc Juarez MD   [facility]    [address]  [facility] [address] [phone] OPT 2              24 Hour Appointment Hotline       To make an appointment at any [facility], call [phone] ([phone]). If you don't have a family doctor or clinic, we will help you find one. [facility] are conveniently located to serve the needs of you and your family.             Review of your medicines      START taking        Dose / Directions Last dose taken    metoclopramide 5 MG tablet   Commonly known as:  REGLAN   Dose:  5 mg   Quantity:  20 tablet        Take 1 tablet (5 mg) by mouth 3 times daily as needed   Refills:  0        sucralfate 1 GM/10ML suspension   Commonly known as:  CARAFATE   Dose:  1 g   Quantity:  420 mL        Take 10 mLs (1 g) by mouth 4 times daily   Refills:  1          Our records show that you are taking the medicines listed below. If these are incorrect, please call your family doctor or clinic.        Dose / Directions Last dose taken    ACCU-CHEK SMARTVIEW test strip   Quantity:  200 strip   Generic drug:  blood glucose monitoring        USE 1 STRIP BY IN VITRO ROUTE 2 TIMES DAILY OR AS DIRECTED   Refills:  1        albuterol 108 (90 Base) MCG/ACT Inhaler   Commonly known as:  PROAIR HFA/PROVENTIL HFA/VENTOLIN HFA   Dose:  2 puff   Quantity:  1 Inhaler        Inhale 2 puffs into the lungs every 4 hours as needed for shortness of breath / dyspnea or wheezing   Refills:  5        apixaban ANTICOAGULANT 2.5 MG tablet   Commonly known as:  ELIQUIS   Dose:  2.5 mg   Quantity:  180 tablet        Take 1 tablet (2.5 mg) by mouth 2 times daily    Refills:  3        aspirin 81 MG EC tablet   Dose:  81 mg        Take 1 tablet (81 mg) by mouth daily   Refills:  0        fluticasone 50 MCG/ACT spray   Commonly known as:  FLONASE   Dose:  1-2 spray        Spray 1-2 sprays into both nostrils every evening   Refills:  0        furosemide 40 MG tablet   Commonly known as:  LASIX   Dose:  20 mg   Quantity:  90 tablet        Take 0.5 tablets (20 mg) by mouth daily   Refills:  1        * GLUCOTROL PO   Dose:  5 mg        Take 5 mg by mouth every morning (before breakfast)   Refills:  0        * glipiZIDE 5 MG tablet   Commonly known as:  GLUCOTROL   Quantity:  135 tablet        TAKE 1 TABLET BY MOUTH EVERY MORNING AND 0.5 TABLET EVERY EVENING   Refills:  1        hydrocortisone 2.5 % cream   Commonly known as:  ANUSOL-HC   Quantity:  30 g        Place rectally 2 times daily   Refills:  0        hydrOXYzine 25 MG capsule   Commonly known as:  VISTARIL   Dose:  25 mg   Quantity:  180 capsule        Take 1 capsule (25 mg) by mouth 2 times daily as needed for itching   Refills:  1        Lidocaine 4 % Patch   Commonly known as:  LIDOCARE   Dose:  1 patch   Quantity:  30 patch        Place 1 patch onto the skin every 24 hours   Refills:  1        lisinopril 10 MG tablet   Commonly known as:  PRINIVIL/ZESTRIL   Dose:  10 mg   Quantity:  180 tablet        Take 1 tablet (10 mg) by mouth 2 times daily   Refills:  1        magnesium hydroxide 400 MG/5ML suspension   Commonly known as:  MILK OF MAGNESIA   Dose:  30 mL        Take 30 mLs by mouth At Bedtime   Refills:  0        metoprolol tartrate 25 MG tablet   Commonly known as:  LOPRESSOR   Dose:  25 mg   Quantity:  180 tablet        Take 1 tablet (25 mg) by mouth 2 times daily   Refills:  3        ondansetron 4 MG ODT tab   Commonly known as:  ZOFRAN ODT   Dose:  4 mg   Quantity:  30 tablet        Take 1 tablet (4 mg) by mouth every 6 hours as needed for nausea   Refills:  1        PRESERVISION/LUTEIN Caps   Dose:  2 capsule         Take 2 capsules by mouth daily   Refills:  0        ranitidine 150 MG tablet   Commonly known as:  ZANTAC   Quantity:  180 tablet        TAKE ONE TABLET BY MOUTH TWO TIMES A DAY   Refills:  3        simvastatin 10 MG tablet   Commonly known as:  ZOCOR   Quantity:  90 tablet        TAKE 1 TABLET (10 MG) BY MOUTH AT BEDTIME   Refills:  2        tiotropium 18 MCG capsule   Commonly known as:  SPIRIVA   Dose:  18 mcg   Quantity:  30 capsule        Inhale 1 capsule (18 mcg) into the lungs every evening   Refills:  11        traMADol 50 MG tablet   Commonly known as:  ULTRAM   Dose:  50 mg   Quantity:  60 tablet        Take 1 tablet (50 mg) by mouth every 6 hours as needed for severe pain   Refills:  1        * Notice:  This list has 2 medication(s) that are the same as other medications prescribed for you. Read the directions carefully, and ask your doctor or other care provider to review them with you.            Prescriptions were sent or printed at these locations (2 Prescriptions)                   Other Prescriptions                Printed at Department/Unit printer (2 of 2)         metoclopramide (REGLAN) 5 MG tablet               sucralfate (CARAFATE) 1 GM/10ML suspension                Procedures and tests performed during your visit     CBC with platelets differential    CT Abdomen Pelvis w Contrast    Comprehensive metabolic panel    Give 20 ounces of water 15 minutes before CT of abdomen    Lipase    XR Abdomen 2 Views      Orders Needing Specimen Collection     None      Pending Results     No orders found from 7/16/2018 to 7/19/2018.            Pending Culture Results     No orders found from 7/16/2018 to 7/19/2018.            Pending Results Instructions     If you had any lab results that were not finalized at the time of your Discharge, you can call the ED Lab Result RN at [phone]. You will be contacted by this team for any positive Lab results or changes in treatment. The nurses are available  7 days a week from 10A to 6:30P.  You can leave a message 24 hours per day and they will return your call.        Test Results From Your Hospital Stay        7/18/2018 12:12 PM      Component Results     Component Value Ref Range & Units Status    WBC 7.9 4.0 - 11.0 10e9/L Final    RBC Count 4.02 3.8 - 5.2 10e12/L Final    Hemoglobin 13.0 11.7 - 15.7 g/dL Final    Hematocrit 37.9 35.0 - 47.0 % Final    MCV 94 78 - 100 fl Final    MCH 32.3 26.5 - 33.0 pg Final    MCHC 34.3 31.5 - 36.5 g/dL Final    RDW 17.6 (H) 10.0 - 15.0 % Final    Platelet Count 181 150 - 450 10e9/L Final    Diff Method Automated Method  Final    % Neutrophils 82.7 % Final    % Lymphocytes 11.1 % Final    % Monocytes 4.7 % Final    % Eosinophils 1.1 % Final    % Basophils 0.3 % Final    % Immature Granulocytes 0.1 % Final    Nucleated RBCs 0 0 /100 Final    Absolute Neutrophil 6.5 1.6 - 8.3 10e9/L Final    Absolute Lymphocytes 0.9 0.8 - 5.3 10e9/L Final    Absolute Monocytes 0.4 0.0 - 1.3 10e9/L Final    Absolute Eosinophils 0.1 0.0 - 0.7 10e9/L Final    Absolute Basophils 0.0 0.0 - 0.2 10e9/L Final    Abs Immature Granulocytes 0.0 0 - 0.4 10e9/L Final    Absolute Nucleated RBC 0.0  Final         7/18/2018 12:30 PM      Component Results     Component Value Ref Range & Units Status    Sodium 129 (L) 133 - 144 mmol/L Final    Potassium 4.0 3.4 - 5.3 mmol/L Final    Chloride 93 (L) 94 - 109 mmol/L Final    Carbon Dioxide 27 20 - 32 mmol/L Final    Anion Gap 9 3 - 14 mmol/L Final    Glucose 167 (H) 70 - 99 mg/dL Final    Urea Nitrogen 17 7 - 30 mg/dL Final    Creatinine 1.11 (H) 0.52 - 1.04 mg/dL Final    GFR Estimate 47 (L) >60 mL/min/1.7m2 Final    Non  GFR Calc    GFR Estimate If Black 57 (L) >60 mL/min/1.7m2 Final    African American GFR Calc    Calcium 10.3 (H) 8.5 - 10.1 mg/dL Final    Bilirubin Total 1.0 0.2 - 1.3 mg/dL Final    Albumin 3.5 3.4 - 5.0 g/dL Final    Protein Total 7.6 6.8 - 8.8 g/dL Final    Alkaline Phosphatase  79 40 - 150 U/L Final    ALT 17 0 - 50 U/L Final    AST 13 0 - 45 U/L Final         7/18/2018 12:28 PM      Component Results     Component Value Ref Range & Units Status    Lipase 78 73 - 393 U/L Final         7/18/2018 12:39 PM      Narrative     XR ABDOMEN 2 VW 7/18/2018 12:25 PM    COMPARISON: CT dated 5/22/2018    HISTORY: Abdominal pain.        Impression     IMPRESSION: Gas-filled loops of distended bowel are seen. No free air.    DAPHNE MACIEL MD         7/18/2018  2:07 PM      Narrative     CT ABDOMEN AND PELVIS WITH CONTRAST   7/18/2018 1:45 PM     HISTORY: Abdominal pain and nausea.    TECHNIQUE: 85 mL Isovue-370 IV were administered. After contrast  administration, volumetric helical sections were acquired from the  lung bases to the ischial tuberosities. Coronal images were also  reconstructed. Radiation dose for this scan was reduced using  automated exposure control, adjustment of the mA and/or kV according  to patient size, or iterative reconstruction technique.    COMPARISON: CT of the chest, abdomen, and pelvis performed 5/22/2018.     FINDINGS: No bowel obstruction. No convincing evidence for colitis or  diverticulitis. The appendix is well seen and is unremarkable. No free  fluid in the pelvis. Advanced atherosclerotic aortoiliac  calcification. Prior cholecystectomy. The liver, spleen, adrenal  glands, and pancreas are unremarkable. There are several bilateral  renal cysts, with the largest in the upper pole on the left measuring  3.3 cm. The kidneys are otherwise unremarkable. No hydronephrosis.     Cardiac enlargement has a similar appearance to the previous exam.  Cardiac leads are noted in the right heart. Mild consolidation at the  left lung base posteriorly is partially included on this exam, but is  not appreciably changed compared to 5/22/2018.  Degenerative changes  are noted throughout the visualized thoracolumbar spine. Lumbar curve,  convex left. Postoperative changes of prior  vertebroplasty at T12 and  L1. Moderate anterior compression at T11 and T12 is unchanged.        Impression     IMPRESSION:   1. No acute abnormality in the abdomen or pelvis. No cause for  abdominal pain is identified.  2. Prior cholecystectomy.  3. Cardiac enlargement.    4. Consolidation at the left lung base posteriorly is partially  included on this exam but does not appear significantly changed where  visualized compared to 5/22/2018. This finding is nonspecific and  could represent chronic atelectasis, although a pneumonia in this  location cannot entirely be excluded. Please clinically correlate.     BARAK IZQUIERDO MD                Clinical Quality Measure: Blood Pressure Screening     Your blood pressure was checked while you were in the emergency department today. The last reading we obtained was  BP: 145/77 . Please read the guidelines below about what these numbers mean and what you should do about them.  If your systolic blood pressure (the top number) is less than 120 and your diastolic blood pressure (the bottom number) is less than 80, then your blood pressure is normal. There is nothing more that you need to do about it.  If your systolic blood pressure (the top number) is 120-139 or your diastolic blood pressure (the bottom number) is 80-89, your blood pressure may be higher than it should be. You should have your blood pressure rechecked within a year by a primary care provider.  If your systolic blood pressure (the top number) is 140 or greater or your diastolic blood pressure (the bottom number) is 90 or greater, you may have high blood pressure. High blood pressure is treatable, but if left untreated over time it can put you at risk for heart attack, stroke, or kidney failure. You should have your blood pressure rechecked by a primary care provider within the next 4 weeks.  If your provider in the emergency department today gave you specific instructions to follow-up with your doctor or  provider even sooner than that, you should follow that instruction and not wait for up to 4 weeks for your follow-up visit.        Thank you for choosing [facility]       Thank you for choosing [facility] for your care. Our goal is always to provide you with excellent care. Hearing back from our patients is one way we can continue to improve our services. Please take a few minutes to complete the written survey that you may receive in the mail after you visit with us. Thank you!        [employer] Information     [employer] gives you secure access to your electronic health record. If you see a primary care provider, you can also send messages to your care team and make appointments. If you have questions, please call your primary care clinic.  If you do not have a primary care provider, please call [phone] and they will assist you.        Care EveryWhere ID     This is your Care EveryWhere ID. This could be used by other organizations to access your [facility] medical records  [id number]        Equal Access to Services     TONG TUCKER : Yariel Brady, jefe valentino, maribell castañeda, pamela mccullough . So [facility] [phone].    ATENCIÓN: Si habla español, tiene a gupta disposición servicios gratuitos de asistencia lingüística. Maxim al [phone].    We comply with applicable federal civil rights laws and [place] laws. We do not discriminate on the basis of race, color, national origin, age, disability, sex, sexual orientation, or gender identity.            After Visit Summary       This is your record. Keep this with you and show to your community pharmacist(s) and doctor(s) at your next visit.

## 2018-07-19 NOTE — PROGRESS NOTES
Clinic Care Coordination Contact  [facility]/Voicemail     Referral: ED Follow Up     Clinical Data: Care Coordinator Outreach  Outreach attempted x 2.  Left message on voicemail with call back information and requested return call.  Plan: Care Coordinator mailed out care coordination introduction letter on 07/19/18.   Care Coordinator will do no further outreaches at this time.    Tomasa Wagner RN  Clinic Care Coordinator  [phone]  [email or web address]

## 2018-07-19 NOTE — LETTER
[place] CARE COORDINATION  [facility]     July 19, 2018    Helene Gibbs  [address] [place] [address]      Dear Helene,    I am a clinic care coordinator who works with Neisha Esparza MD at [facility]. I have been trying to reach you recently to introduce Clinic Care Coordination and to see if there was anything I could assist you with.  I wanted to introduce myself and provide you with my contact information so that you can call me with questions or concerns about your health care. Below is a description of clinic care coordination and how I can further assist you.     The clinic care coordinator is a registered nurse and/or  who understand the health care system. The goal of clinic care coordination is to help you manage your health and improve access to the [facility] system in the most efficient manner. The registered nurse can assist you in meeting your health care goals by providing education, coordinating services, and strengthening the communication among your providers. The  can assist you with financial, behavioral, psychosocial, chemical dependency, counseling, and/or psychiatric resources.    Please feel free to contact me at [phone], with any questions or concerns. We at [facility] are focused on providing you with the highest-quality healthcare experience possible and that all starts with you.     Sincerely,     Crystal Burciaga RN Care Coordinator

## 2018-07-20 NOTE — PROGRESS NOTES
"Clinic Care Coordination Contact    Clinic Care Coordination Contact  OUTREACH    Chief Complaint   Patient presents with     Clinic Care Coordination - [facility]     ED Follow Up        Universal Utilization: Frequent ED use. Education provided on clinic support and urgent care use.      Utilization    Last refreshed: 7/19/2018  1:48 PM:  No Show Count (past year) 0       Last refreshed: 7/19/2018  1:48 PM:  ED visits 6       Last refreshed: 7/19/2018  1:48 PM:  Hospital admissions 3          Current as of: 7/19/2018  1:48 PM             Clinical Concerns:  Current Medical Concerns: Spoke with the patient after multiple ED visits this week. Patient stated \"I still dont feel good\" Patient stated she had no diarrhea and is able to eat a little. Patient just tired and feeling \"blah\". Patient does have a home health aide coming to see her tomorrow for 2 hours. Patient enjoys her visits. Patient denied any concerns at this time. Patient asked for Care Coordinator to call her back next week.     Current Behavioral Concerns: No concerns at this time.     Education Provided to patient: Care Coordination. Use of urgent care and ED.       Living Situation:   [facility]. Pt able to switch to Assisted living but she does not feel she is needing that as this time.      Diet/Exercise/Sleep:   Patient having trouble eating due to nausea and not feeling well. Patient encouraged to eat bland small meals. Patient understood.   Patient stated she is very fatigued. Patient resting a lot. Education on rest was given.        Goals:   Goals        Diet    Have 3 meals a day     Notes - Note created  5/23/2018 10:20 AM by Crystal Burciaga RN    Goal Statement: I will try to eat 3 small meals a day   Measure of Success: PCP will continue to weigh the patient at her appointments and monitor for increased weight loss.   Supportive Steps to Achieve: Education on the importance of eating 3 meals " day.   Barriers: Loss of appetite stated by the patient.   Strengths: Patient moving to [facility] in July with meals included.   Date to Achieve By: August 1, 2018          General    Healthy Eating (pt-stated)     Pain Management (pt-stated)     Pain Management (pt-stated)     Notes - Note created  5/23/2018 10:17 AM by Crystal Burciaga RN    Goal Statement: I will continue to take my pain medication as prescribed. I will call my physician if the pain gets worse.   Measure of Success: Patient will stay out of the ED. Patient will call her PCP if needed. RN CC will continue to call the patient to assess how her pain is.   Supportive Steps to Achieve: RN CC will reach out monthly to assess pain. Patient will continue to keep her appointments.   Strengths: Good PCP support. Appointment scheduled with [facility].   Date to Achieve By: August 1, 2018      start date: 4/17/14 I will weigh myself daily and if any weight gain or shortness of breath I will call the clinic. (pt-stated)     Notes - Note created  4/17/2014  3:59 PM by Margareth Pichardo RN    As of today's date 4/17/2014 goal is met at 0 - 25%.   Goal Status:  Active              Patient/Caregiver understanding: Patient verbalized understanding, engaged in AIDET communication behavior during encounter.      Outreach Frequency: monthly  Future Appointments              In 2 weeks [facility]    In 2 weeks Jc Juarez MD [facility]          Plan:   1. RN Care Coordinator will touch base with the patient next week to address how she is feeling.     Tomasa Burciaga RN  Clinic Care Coordinator  [phone]  [email or web address]

## 2018-07-21 PROBLEM — R53.1 GENERALIZED WEAKNESS: Status: ACTIVE | Noted: 2018-01-01

## 2018-07-21 NOTE — ED NOTES
Pt sitting in chair watching TV. States she doesn't need to watch Obs video as she's seen it before.

## 2018-07-21 NOTE — ED NOTES
Quick cath done using sterile technique. Pt tolerated well. Pt repositioned multiple times for comfort.

## 2018-07-21 NOTE — PLAN OF CARE
Problem: Patient Care Overview  Goal: Discharge Needs Assessment  Outcome: No Change  PRIMARY DIAGNOSIS: GENERALIZED WEAKNESS    OUTPATIENT/OBSERVATION GOALS TO BE MET BEFORE DISCHARGE  1. Orthostatic performed: No    2. Tolerating PO medications: Yes    3. Return to near baseline physical activity: Yes    4. Cleared for discharge by consultants (if involved): No    Discharge Planner Nurse   Safe discharge environment identified: Yes  Barriers to discharge: No       Entered by: Mayelin REID Leukuma 07/21/2018 6:13 PM     Please review provider order for any additional goals.   Nurse to notify provider when observation goals have been met and patient is ready for discharge.

## 2018-07-21 NOTE — PLAN OF CARE
Problem: Patient Care Overview  Goal: Plan of Care/Patient Progress Review  Outcome: No Change  OBSERVATION GOALS    -diagnostic tests and consults completed and resulted: not met  -vital signs normal or at patient baseline: partially met    VSS ex elevated BP. IV infusing. Productive, infrequent cough. POND. Passing gas. Belching frequently. Poor appetite. Ate 3/4 serving mashed potatoes and yogurt. C/o generalized weakness. Tramadol for chronic back pain. Continue to monitor

## 2018-07-21 NOTE — IP AVS SNAPSHOT
[facility] Observation Unit    [address] [place] [id number]    Phone:  [phone]                                       After Visit Summary   7/21/2018    Helene Gibbs    MRN: [id number]           After Visit Summary Signature Page     I have received my discharge instructions, and my questions have been answered. I have discussed any challenges I see with this plan with the nurse or doctor.    ..........................................................................................................................................  Patient/Patient Representative Signature      ..........................................................................................................................................  Patient Representative Print Name and Relationship to Patient    ..................................................               ................................................  Date                                            Time    ..........................................................................................................................................  Reviewed by Signature/Title    ...................................................              ..............................................  Date                                                            Time

## 2018-07-21 NOTE — IP AVS SNAPSHOT
MRN:[id number]                      After Visit Summary   7/21/2018    Helene Gibbs    MRN: [id number]           Thank you!     Thank you for choosing [facility] for your care. Our goal is always to provide you with excellent care. Hearing back from our patients is one way we can continue to improve our services. Please take a few minutes to complete the written survey that you may receive in the mail after you visit with us. Thank you!        Patient Information     Date Of Birth          1935        About your hospital stay     You were admitted on:  July 21, 2018 You last received care in the:  [facility] Observation Unit    You were discharged on:  July 23, 2018        Reason for your hospital stay       You were admitted for evaluation of generalized weakness due to pneumonia. Continue antibiotics. You will have home therapies to help you get stronger.                  Who to Call     For medical emergencies, please call 911.  For non-urgent questions about your medical care, please call your primary care provider or clinic, [phone]          Attending Provider     Provider Specialty    Antonio Givens DO Emergency Medicine    Wale Jeong MD Internal Medicine    Ilana Gamez MD Internal Medicine       Primary Care Provider Office Phone # Fax #    Neisha Esparza [phone]      After Care Instructions     Diet       Follow this diet upon discharge: Orders Placed This Encounter      Combination Diet [id number] Calories: Moderate Consistent CHO (4-6 CHO units/meal)                  Follow-up Appointments     Follow-up and recommended labs and tests        Follow up with primary care provider, Neisha Esparza, within 7 days for hospital follow- up.  No follow up labs or test are needed.                  Your next 10 appointments already scheduled     Jul 30, 2018  2:30 PM CDT   Office Visit with Neisha Esparza MD   [facility]  ([facility])    [address] [id number] [phone]           Bring a current list of meds and any records pertaining to this visit. For Physicals, please bring immunization records and any forms needing to be filled out. Please arrive 10 minutes early to complete paperwork.            Aug 06, 2018  2:00 PM CDT   Pacemaker Check with STONE DCR2   [facility] ([facility])    [address]  [place] [facility] [id number] [phone] OPT 2            Aug 06, 2018  3:15 PM CDT   [facility] EP RETURN with Jc Juarez MD   [facility] ([facility])    [id number]  [facility] [id number] [phone] OPT 2              Additional Services     Home Care OT Referral for Hospital Discharge       OT to eval and treat    Your provider has ordered home care - occupational therapy. If you have not been contacted within 2 days of your discharge please call the department phone number listed on the top of this document.            Home Care PT Referral for Hospital Discharge       PT to eval and treat    Your provider has ordered home care - physical therapy. If you have not been contacted within 2 days of your discharge please call the department phone number listed on the top of this document.            Home care nursing referral       RN skilled nursing visit. RN to assess vital signs and weight, respiratory and cardiac status and hydration, nutrition and bowel status.  RN to teach medication management.      Your provider has ordered home care nursing services. If you have not been contacted within 2 days of your discharge please call the inpatient department phone number at [phone] .                  Further instructions from your care team       [facility]'s phone number is [phone]    Pending Results     No orders found from 7/19/2018 to 7/22/2018.  "           Statement of Approval     Ordered          07/23/18 0859  I have reviewed and agree with all the recommendations and orders detailed in this document.  EFFECTIVE NOW     Approved and electronically signed by:  Ilana Gamez MD             Admission Information     Date & Time Provider Department Dept. Phone    7/21/2018 Ilana Gamez MD [facility] Observation Unit [phone]      Your Vitals Were     Blood Pressure Pulse Temperature Respirations Height Last Period    156/81 (BP Location: Left arm) 75 97.6  F (36.4  C) (Oral) 18 1.664 m (5' 5.5\") (LMP Unknown)    Pulse Oximetry                   95%           MyChart Information     [employer] gives you secure access to your electronic health record. If you see a primary care provider, you can also send messages to your care team and make appointments. If you have questions, please call your primary care clinic.  If you do not have a primary care provider, please call [phone] and they will assist you.        Care EveryWhere ID     This is your Care EveryWhere ID. This could be used by other organizations to access your [facility] medical records  [id number]        Equal Access to Services     [facility]: Hadii sukhdeep Brady, wavielka valentino, qaortiz castañeda, pamela nolasco. So [facility] [phone].    ATENCIÓN: Si habla español, tiene a gupta disposición servicios gratuitos de asistencia lingüística. Maxim al [phone].    We comply with applicable federal civil rights laws and [place] laws. We do not discriminate on the basis of race, color, national origin, age, disability, sex, sexual orientation, or gender identity.               Review of your medicines      START taking        Dose / Directions    guaiFENesin 600 MG 12 hr tablet   Commonly known as:  MUCINEX   Used for:  Pneumonia due to infectious organism, unspecified laterality, unspecified part of lung        Dose:  600 mg   Take 1 tablet (600 " mg) by mouth 2 times daily   Quantity:  28 tablet   Refills:  0       levofloxacin 250 MG tablet   Commonly known as:  LEVAQUIN   Indication:  Community Acquired Pneumonia   Used for:  Pneumonia due to infectious organism, unspecified laterality, unspecified part of lung        Dose:  250 mg   Take 1 tablet (250 mg) by mouth daily   Quantity:  4 tablet   Refills:  0         CONTINUE these medicines which have NOT CHANGED        Dose / Directions    ACCU-CHEK SMARTVIEW test strip   Used for:  Type 2 diabetes mellitus with diabetic nephropathy (H)   Generic drug:  blood glucose monitoring        USE 1 STRIP BY IN VITRO ROUTE 2 TIMES DAILY OR AS DIRECTED   Quantity:  200 strip   Refills:  1       albuterol 108 (90 Base) MCG/ACT Inhaler   Commonly known as:  PROAIR HFA/PROVENTIL HFA/VENTOLIN HFA   Used for:  COPD (chronic obstructive pulmonary disease) (H)        Dose:  2 puff   Inhale 2 puffs into the lungs every 4 hours as needed for shortness of breath / dyspnea or wheezing   Quantity:  1 Inhaler   Refills:  5       apixaban ANTICOAGULANT 2.5 MG tablet   Commonly known as:  ELIQUIS   Used for:  Atrial fibrillation, unspecified type (H)        Dose:  2.5 mg   Take 1 tablet (2.5 mg) by mouth 2 times daily   Quantity:  180 tablet   Refills:  3       aspirin 81 MG EC tablet   Used for:  NSTEMI (non-ST elevated myocardial infarction) (H)        Dose:  81 mg   Take 1 tablet (81 mg) by mouth daily   Refills:  0       FUROSEMIDE PO   Notes to Patient:  Resume home schedule        Dose:  20 mg   Take 20 mg by mouth daily   Refills:  0       GLUCOTROL PO   Notes to Patient:  Resume home schedule        Dose:  5 mg   Take 5 mg by mouth every morning (before breakfast)   Refills:  0       hydrOXYzine 25 MG capsule   Commonly known as:  VISTARIL   Used for:  Itching   Notes to Patient:  Resume home schedule        Dose:  25 mg   Take 1 capsule (25 mg) by mouth 2 times daily as needed for itching   Quantity:  180 capsule    Refills:  1       lisinopril 10 MG tablet   Commonly known as:  PRINIVIL/ZESTRIL   Used for:  NSTEMI (non-ST elevated myocardial infarction) (H)        Dose:  10 mg   Take 1 tablet (10 mg) by mouth 2 times daily   Quantity:  180 tablet   Refills:  1       magnesium hydroxide 400 MG/5ML suspension   Commonly known as:  MILK OF MAGNESIA        Dose:  30 mL   Take 30 mLs by mouth At Bedtime   Refills:  0       metoclopramide 5 MG tablet   Commonly known as:  REGLAN   Notes to Patient:  Resume home schedule        Dose:  5 mg   Take 1 tablet (5 mg) by mouth 3 times daily as needed   Quantity:  20 tablet   Refills:  0       metoprolol tartrate 25 MG tablet   Commonly known as:  LOPRESSOR   Used for:  NSTEMI (non-ST elevated myocardial infarction) (H)        Dose:  25 mg   Take 1 tablet (25 mg) by mouth 2 times daily   Quantity:  180 tablet   Refills:  3       ondansetron 4 MG ODT tab   Commonly known as:  ZOFRAN ODT   Used for:  Nausea   Notes to Patient:  Resume home schedule        Dose:  4 mg   Take 1 tablet (4 mg) by mouth every 6 hours as needed for nausea   Quantity:  30 tablet   Refills:  1       ranitidine 150 MG tablet   Commonly known as:  ZANTAC   Used for:  Dyspepsia   Notes to Patient:  Resume home schedule        TAKE ONE TABLET BY MOUTH TWO TIMES A DAY   Quantity:  180 tablet   Refills:  3       simvastatin 10 MG tablet   Commonly known as:  ZOCOR   Used for:  Type 2 diabetes mellitus with diabetic nephropathy, without long-term current use of insulin (H)   Notes to Patient:  Resume home schedule        TAKE 1 TABLET (10 MG) BY MOUTH AT BEDTIME   Quantity:  90 tablet   Refills:  2       sucralfate 1 GM/10ML suspension   Commonly known as:  CARAFATE        Dose:  1 g   Take 10 mLs (1 g) by mouth 4 times daily   Quantity:  420 mL   Refills:  1       tiotropium 18 MCG capsule   Commonly known as:  SPIRIVA   Used for:  Chronic obstructive pulmonary disease, unspecified COPD type (H)   Notes to Patient:   Resume home schedule        Dose:  18 mcg   Inhale 1 capsule (18 mcg) into the lungs every evening   Quantity:  30 capsule   Refills:  11       traMADol 50 MG tablet   Commonly known as:  ULTRAM   Used for:  Intractable back pain        Dose:  50 mg   Take 1 tablet (50 mg) by mouth every 6 hours as needed for severe pain   Quantity:  60 tablet   Refills:  1            Where to get your medicines      These medications were sent to [facility] [place], [place] - [address]  [address] [place], [place] [address]     Phone:  [phone]     guaiFENesin 600 MG 12 hr tablet    levofloxacin 250 MG tablet                Protect others around you: Learn how to safely use, store and throw away your medicines at www.disposemymeds.org.        ANTIBIOTIC INSTRUCTION     You've Been Prescribed an Antibiotic - Now What?  Your healthcare team thinks that you or your loved one might have an infection. Some infections can be treated with antibiotics, which are powerful, life-saving drugs. Like all medications, antibiotics have side effects and should only be used when necessary. There are some important things you should know about your antibiotic treatment.      Your healthcare team may run tests before you start taking an antibiotic.    Your team may take samples (e.g., from your blood, urine or other areas) to run tests to look for bacteria. These test can be important to determine if you need an antibiotic at all and, if you do, which antibiotic will work best.      Within a few days, your healthcare team might change or even stop your antibiotic.    Your team may start you on an antibiotic while they are working to find out what is making you sick.    Your team might change your antibiotic because test results show that a different antibiotic would be better to treat your infection.    In some cases, once your team has more information, they learn that you do not need an antibiotic at all. They may find  out that you don't have an infection, or that the antibiotic you're taking won't work against your infection. For example, an infection caused by a virus can't be treated with antibiotics. Staying on an antibiotic when you don't need it is more likely to be harmful than helpful.      You may experience side effects from your antibiotic.    Like all medications, antibiotics have side effects. Some of these can be serious.    Let you healthcare team know if you have any known allergies when you are admitted to the hospital.    One significant side effect of nearly all antibiotics is the risk of severe and sometimes deadly diarrhea caused by Clostridium difficile (C. Difficile). This occurs when a person takes antibiotics because some good germs are destroyed. Antibiotic use allows C. diificile to take over, putting patients at high risk for this serious infection.    As a patient or caregiver, it is important to understand your or your loved one's antibiotic treatment. It is especially important for caregivers to speak up when patients can't speak for themselves. Here are some important questions to ask your healthcare team.    What infection is this antibiotic treating and how do you know I have that infection?    What side effects might occur from this antibiotic?    How long will I need to take this antibiotic?    Is it safe to take this antibiotic with other medications or supplements (e.g., vitamins) that I am taking?     Are there any special directions I need to know about taking this antibiotic? For example, should I take it with food?    How will I be monitored to know whether my infection is responding to the antibiotic?    What tests may help to make sure the right antibiotic is prescribed for me?      Information provided by:  www.cdc.gov/getsmart  U.S. Department of Health and Human Services  Centers for disease Control and Prevention  National Center for Emerging and Zoonotic Infectious  Diseases  Division of Healthcare Quality Promotion             Medication List: This is a list of all your medications and when to take them. Check marks below indicate your daily home schedule. Keep this list as a reference.      Medications           Morning Afternoon Evening Bedtime As Needed    ACCU-CHEK SMARTVIEW test strip   USE 1 STRIP BY IN VITRO ROUTE 2 TIMES DAILY OR AS DIRECTED   Generic drug:  blood glucose monitoring                                albuterol 108 (90 Base) MCG/ACT Inhaler   Commonly known as:  PROAIR HFA/PROVENTIL HFA/VENTOLIN HFA   Inhale 2 puffs into the lungs every 4 hours as needed for shortness of breath / dyspnea or wheezing   Last time this was given:  2 puffs on 7/23/2018  8:56 AM                                   apixaban ANTICOAGULANT 2.5 MG tablet   Commonly known as:  ELIQUIS   Take 1 tablet (2.5 mg) by mouth 2 times daily   Last time this was given:  2.5 mg on 7/23/2018  8:56 AM   Next Dose Due:  7/23 with supper                                      aspirin 81 MG EC tablet   Take 1 tablet (81 mg) by mouth daily   Last time this was given:  81 mg on 7/23/2018  8:56 AM   Next Dose Due:  7/24                                   FUROSEMIDE PO   Take 20 mg by mouth daily   Notes to Patient:  Resume home schedule                                   GLUCOTROL PO   Take 5 mg by mouth every morning (before breakfast)   Notes to Patient:  Resume home schedule                                   guaiFENesin 600 MG 12 hr tablet   Commonly known as:  MUCINEX   Take 1 tablet (600 mg) by mouth 2 times daily   Last time this was given:  600 mg on 7/23/2018 10:10 AM   Next Dose Due:  7/23 in the evening                                      hydrOXYzine 25 MG capsule   Commonly known as:  VISTARIL   Take 1 capsule (25 mg) by mouth 2 times daily as needed for itching   Notes to Patient:  Resume home schedule                                   levofloxacin 250 MG tablet   Commonly known as:  LEVAQUIN    Take 1 tablet (250 mg) by mouth daily   Last time this was given:  250 mg on 7/23/2018  8:56 AM   Next Dose Due:  7/24 AM                                   lisinopril 10 MG tablet   Commonly known as:  PRINIVIL/ZESTRIL   Take 1 tablet (10 mg) by mouth 2 times daily   Last time this was given:  10 mg on 7/23/2018  8:57 AM   Next Dose Due:  7/23 in the evening                                      magnesium hydroxide 400 MG/5ML suspension   Commonly known as:  MILK OF MAGNESIA   Take 30 mLs by mouth At Bedtime   Last time this was given:  30 mLs on 7/23/2018  8:57 AM                                   metoclopramide 5 MG tablet   Commonly known as:  REGLAN   Take 1 tablet (5 mg) by mouth 3 times daily as needed   Notes to Patient:  Resume home schedule                                   metoprolol tartrate 25 MG tablet   Commonly known as:  LOPRESSOR   Take 1 tablet (25 mg) by mouth 2 times daily   Last time this was given:  25 mg on 7/23/2018  8:57 AM   Next Dose Due:  7/23 in the evening                                      ondansetron 4 MG ODT tab   Commonly known as:  ZOFRAN ODT   Take 1 tablet (4 mg) by mouth every 6 hours as needed for nausea   Notes to Patient:  Resume home schedule                                   ranitidine 150 MG tablet   Commonly known as:  ZANTAC   TAKE ONE TABLET BY MOUTH TWO TIMES A DAY   Notes to Patient:  Resume home schedule                                      simvastatin 10 MG tablet   Commonly known as:  ZOCOR   TAKE 1 TABLET (10 MG) BY MOUTH AT BEDTIME   Notes to Patient:  Resume home schedule                                   sucralfate 1 GM/10ML suspension   Commonly known as:  CARAFATE   Take 10 mLs (1 g) by mouth 4 times daily   Last time this was given:  1 g on 7/23/2018  8:14 AM   Next Dose Due:  7/23 before supper                                            tiotropium 18 MCG capsule   Commonly known as:  SPIRIVA   Inhale 1 capsule (18 mcg) into the lungs every evening    Notes to Patient:  Resume home schedule                                   traMADol 50 MG tablet   Commonly known as:  ULTRAM   Take 1 tablet (50 mg) by mouth every 6 hours as needed for severe pain   Last time this was given:  50 mg on 7/23/2018  8:25 AM                                             More Information        Pneumonia (Adult)  Pneumonia is an infection deep within the lungs. It is in the small air sacs (alveoli). Pneumonia may be caused by a virus or bacteria. Pneumonia caused by bacteria is usually treated with an antibiotic. Severe cases may need to be treated in the hospital. Milder cases can be treated at home. Symptoms usually start to get better during the first 2 days of treatment.    Home care  Follow these guidelines when caring for yourself at home:    Rest at home for the first 2 to 3 days, or until you feel stronger. Don t let yourself get overly tired when you go back to your activities.    Stay away from cigarette smoke - yours or other people s.    You may use acetaminophen or ibuprofen to control fever or pain, unless another medicine was prescribed. If you have chronic liver or kidney disease, talk with your healthcare provider before using these medicines. Also talk with your provider if you ve had a stomach ulcer or gastrointestinal bleeding. Don t give aspirin to anyone younger than 18 years of age who is ill with a fever. It may cause severe liver damage.    Your appetite may be poor, so a light diet is fine.    Drink 6 to 8 glasses of fluids every day to make sure you are getting enough fluids. Beverages can include water, sport drinks, sodas without caffeine, juices, tea, or soup. Fluids will help loosen secretions in the lung. This will make it easier for you to cough up the phlegm (sputum). If you also have heart or kidney disease, check with your healthcare provider before you drink extra fluids.    Take antibiotic medicine prescribed until it is all gone, even if you are  feeling better after a few days.  Follow-up care  Follow up with your healthcare provider in the next 2 to 3 days, or as advised. This is to be sure the medicine is helping you get better.  If you are 65 or older, you should get a pneumococcal vaccine and a yearly flu (influenza) shot. You should also get these vaccines if you have chronic lung disease like asthma, emphysema, or COPD. Recently, a second type of pneumonia vaccine has become available for everyone over 65 years old. This is in addition to the previous vaccine. Ask your provider about this.  When to seek medical advice  Call your healthcare provider right away if any of these occur:    You don t get better within the first 48 hours of treatment    Shortness of breath gets worse    Rapid breathing (more than 25 breaths per minute)    Coughing up blood    Chest pain gets worse with breathing    Fever of 100.4 F (38 C) or higher that doesn t get better with fever medicine    Weakness, dizziness, or fainting that gets worse    Thirst or dry mouth that gets worse    Sinus pain, headache, or a stiff neck    Chest pain not caused by coughing  Date Last Reviewed: 1/1/2017 2000-2017 [employer]. [address] [place] [address]. All rights reserved. This information is not intended as a substitute for professional medical care. Always follow your healthcare professional's instructions.

## 2018-07-21 NOTE — PHARMACY-ADMISSION MEDICATION HISTORY
Admission medication history interview status for the 7/21/2018  admission is complete. See EPIC admission navigator for prior to admission medications     Medication history source reliability:Good    Actions taken by pharmacist (provider contacted, etc): Interviewed patient, used surescripts to verify medication strengths    Additional medication history information not noted on PTA med list :None    Medication reconciliation/reorder completed by provider prior to medication history? No    Time spent in this activity: 15 minutes    Prior to Admission medications    Medication Sig Last Dose Taking? Auth Provider   albuterol (PROAIR HFA, PROVENTIL HFA, VENTOLIN HFA) 108 (90 BASE) MCG/ACT inhaler Inhale 2 puffs into the lungs every 4 hours as needed for shortness of breath / dyspnea or wheezing 7/21/2018 at am Yes Neisha Esparza MD   apixaban ANTICOAGULANT (ELIQUIS) 2.5 MG tablet Take 1 tablet (2.5 mg) by mouth 2 times daily 7/20/2018 at Unknown time Yes Neisha Esparza MD   aspirin EC 81 MG EC tablet Take 1 tablet (81 mg) by mouth daily 7/20/2018 at Unknown time Yes Dolores Mast PA-C   FUROSEMIDE PO Take 20 mg by mouth daily 7/20/2018 at Unknown time Yes Unknown, Entered By History   GlipiZIDE (GLUCOTROL PO) Take 5 mg by mouth every morning (before breakfast) 7/20/2018 at Unknown time Yes Reported, Patient   hydrOXYzine (VISTARIL) 25 MG capsule Take 1 capsule (25 mg) by mouth 2 times daily as needed for itching prn Yes Neisha Esparza MD   lisinopril (PRINIVIL/ZESTRIL) 10 MG tablet Take 1 tablet (10 mg) by mouth 2 times daily 7/20/2018 at Unknown time Yes Neisha Esparza MD   magnesium hydroxide (MILK OF MAGNESIA) 400 MG/5ML suspension Take 30 mLs by mouth At Bedtime  7/20/2018 at Unknown time Yes Reported, Patient   metoclopramide (REGLAN) 5 MG tablet Take 1 tablet (5 mg) by mouth 3 times daily as needed 7/20/2018 at Unknown time Yes Trigger, Duran Young MD   metoprolol tartrate  (LOPRESSOR) 25 MG tablet Take 1 tablet (25 mg) by mouth 2 times daily 7/20/2018 at Unknown time Yes Neisha Esparza MD   ondansetron (ZOFRAN ODT) 4 MG ODT tab Take 1 tablet (4 mg) by mouth every 6 hours as needed for nausea prn Yes Neisha Esparza MD   ranitidine (ZANTAC) 150 MG tablet TAKE ONE TABLET BY MOUTH TWO TIMES A DAY 7/20/2018 at Unknown time Yes Neisha Esparza MD   simvastatin (ZOCOR) 10 MG tablet TAKE 1 TABLET (10 MG) BY MOUTH AT BEDTIME 7/20/2018 at Unknown time Yes Neisha Esparza MD   sucralfate (CARAFATE) 1 GM/10ML suspension Take 10 mLs (1 g) by mouth 4 times daily 7/20/2018 at Unknown time Yes Duran Bishop MD   tiotropium (SPIRIVA) 18 MCG capsule Inhale 1 capsule (18 mcg) into the lungs every evening 7/20/2018 at Unknown time Yes Neisha Esparza MD   traMADol (ULTRAM) 50 MG tablet Take 1 tablet (50 mg) by mouth every 6 hours as needed for severe pain prn Yes Neisha Esparza MD   ACCU-CHEK SMARTVIEW test strip USE 1 STRIP BY IN VITRO ROUTE 2 TIMES DAILY OR AS DIRECTED   Neisha Esparza MD

## 2018-07-21 NOTE — ED PROVIDER NOTES
History     Chief Complaint:  Generalized Weakness    HPI   Helene Gibbs is a 83 year old female with  history of Afib and COPD who presents with generalized weakness. The patient stated she has felt weak all over, has had a bloated stomach, a lack of appetite, and difficulty breathing for the past week. This morning, she woke up sweating, prompting her to call the ambulance to bring her to the ED. She also stated she has had an increase in frequency in her chronic loose stools.. The patient has had congestion and has been belching, but denies any history of heart failure, fever, or any other associated symptoms. The patient just recently moved into an assisted living home, and stated that she has felt these symptoms since moving in. The patient is currently on lasix and eliquis.  This is her third presentation to the emergency department within the last week for this issue.    Allergies:  Ambien  Penicillins  Definity  Sulfa Drugs  Cymbalta  Fluconazole    Medications:    Albuterol  Eliquis  Aspirin  Flonase  Lasix  Glucotrol  Vistaril  Lidocare  Lisinopril  Milk of magnesia  Reglan  Lopressor  Zofran  Zantac  Zocor  Carafate  Spiriva  Ultram    Past Medical History:    Anemia  Arthritis  Back pain  Bell's palsy  Breast CA  CKD  Colon polyps  Congestive heart failure  COPD  C.A.D.  DM  Gerd  Hyperlipidemia  Hypertension  Lumbar spinal stenosis  Nicotine dependence  Obesity  Osteoporosis  Other chronic pain  Pacemaker  Permanent atrial fibrillation  Pleural effusion  Pulmonary hypertension  PVD  Rectal stenosis  Tobacco abuse  Tricuspid regurgitation    Past Surgical History:    Shoulder arthroscopy RT/LT  Bone marrow biopsy  C dexa, bone density, axial skel  C mammogram  H ablation Av node  Implant pacemaker  Joint replacement Knee RT/LT  Laparoscopic cholecystectomy with cholangiograms  Lumpectomy breast  Phacoemulsification clear cornea with standard intraocular lens implant RT/LT    Family History:   "  Hypertension  Diabetes  C.A.D.    Social History:  Marital Status:   Smoking Status: Former  Alcohol Use: No    Review of Systems   Constitutional: Positive for appetite change and fatigue. Negative for fever.   HENT: Positive for congestion.    Respiratory: Positive for shortness of breath.    Cardiovascular: Negative for chest pain.   Gastrointestinal: Positive for diarrhea. Negative for abdominal distention.   Neurological: Positive for weakness.   All other systems reviewed and are negative.        Physical Exam     Patient Vitals for the past 24 hrs:   BP Temp Temp src Pulse Heart Rate Resp SpO2 Height   07/21/18 1059 - - - - 70 20 96 % -   07/21/18 1058 - - - - - - 98 % -   07/21/18 1056 - - - - - - 99 % -   07/21/18 1055 - - - - - - 97 % -   07/21/18 1054 - - - - - - 97 % -   07/21/18 1000 - - - - 70 20 - -   07/21/18 0949 - - - - - - 96 % -   07/21/18 0947 - - - - - - 96 % -   07/21/18 0946 - - - - - - 90 % -   07/21/18 0945 (!) 168/93 - - - - - - -   07/21/18 0746 - - - - - - 96 % -   07/21/18 0738 (!) 178/95 98  F (36.7  C) Oral 69 - 16 - 1.664 m (5' 5.5\")       Physical Exam  General: Alert and cooperative with exam. Patient in mild distress. Normal mentation.  Head:  Scalp is NC/AT  Eyes:  No scleral icterus, PERRL, EOMI  ENT:  The external nose and ears are normal. The oropharynx is normal and without erythema; mucus membranes are moist. Uvula midline, no evidence of deep space infection.  Neck:  Normal range of motion without rigidity.  CV:  Regular rate and rhythm.  Pacemaker present  Resp:  Mild crackles in the left lung base otherwise clear to auscultation    Non-labored, no retractions or accessory muscle use  GI:  Abdomen is soft, no distension, no tenderness. No peritoneal signs  Rectal: Nonbleeding nonthrombosed external hemorrhoids with green stool on chaperoned digital rectal exam.  MS:  No lower extremity edema   Skin:  Warm and dry, No rash or lesions noted.  Neuro: Oriented x 3. No " gross motor deficits.  Cranial nerves II through XII intact        Emergency Department Course   ECG:  ECG taken at 0836, ECG read at 0840  Ventricular-paced rhythm  Abnormal ECG  No significant change compared to 7/16/18  Rate 70 bpm. IL interval *. QRS duration 150. QT/QTc 444/479. P-R-T axes * -68 112.    Imaging:  Radiographic findings were communicated with the patient who voiced understanding of the findings.    Chest XR, PA & LAT:  Left lower lobe infiltrate.  Per Radiologist.     Laboratory:  CBC: WNL. (WBC 7.2, HGB 13.3, )   Occult blood stool: Positive  Troponin 1: <0.015  CMP: Glucose 191 (H), GF 44 (L), GFR if black 53 (L), o/w WNL. (Creatinine 1.19 (H))  UA: pH 8.0 (H), Protein Albumin 100, Bacteria Few, Mucous Present, o/w Normal.     Emergency Department Course:  The patient arrived in the emergency department via EMS.  Past medical records, nursing notes, and vitals reviewed.  0755: I performed an exam of the patient and obtained history, as documented above.    EKG obtained in the ED, see results above.   The patient was taken for chest XR, see imaging results above.   IV inserted and blood drawn.  The patient provided a urine sample here in the emergency department. This was sent for laboratory testing, findings above.    admitted to hospital    Impression & Plan      Medical Decision Making:  Patient is a 89-year-old female presents to the third time in 1 week to the emergency department with complaint of generalized weakness as well as chronic loose stool.  Patient's medical history and records reviewed.  Initial consideration for, but not limited to, atypical ACS/MI, arrhythmia, electrolyte abnormality, dehydration, infectious process, GI bleed, among others.  Labs, EKG, and imaging were obtained.  EKG demonstrates paced rhythm which is not significant change from previous.  Labs notable for negative troponin and Hemoccult positive stool.  Stool on exam was noted to be light green and she  did have several external hemorrhoids which may be source of bleeding though internal GI bleed not excluded.  Patient's hemoglobin within normal range.  UA without significant evidence of infection.  Chest x-ray shows evidence of left lower lobe infiltrate which is new as compared to previous imaging and patient did note some increased shortness of breath and phlegm production.  Given history of COPD and evidence of infiltrate on imaging elected to treat patient for community-acquired pneumonia with doxycycline.  No evidence of significant COPD exacerbation and patient remained afebrile.  As patient notes significant concerns about returning home given her current generalized weakness, will admit to observation with the hospitalist service for further evaluation and care.    Diagnosis:    ICD-10-CM    1. Community acquired pneumonia J18.9 Glucose by meter     Glucose by meter     Hemoglobin A1c     Hemoglobin A1c     CANCELED: Hemoglobin A1c   2. Generalized weakness R53.1        Disposition:  Admitted to [facility]  7/21/2018    EMERGENCY DEPARTMENT    Duc SCHULTZ am serving as a scribe at 7:55 AM on 7/21/2018 to document services personally performed by Antonio Givens D.O. based on my observations and the provider's statements to me.        Antonio Givens,   07/21/18 1553

## 2018-07-21 NOTE — ED NOTES
Bed: ED06  Expected date:   Expected time:   Means of arrival:   Comments:  419  83 F weakness  3632

## 2018-07-22 NOTE — PLAN OF CARE
Problem: Patient Care Overview  Goal: Plan of Care/Patient Progress Review  Outcome: No Change   Observation goals PRIOR TO DISCHARGE       Comments: -diagnostic tests and consults completed and resulted: No  -vital signs normal or at patient baseline: No  Nurse to notify provider when observation goals have been met and patient is ready for discharge.        A&O x4, SBA +walker.  VSS except for HTN on room air.  POND. LS diminished with fine crackles in the bases.   Back pain managed with PRN Tramadol x1.  Tylenol given xq per pt request for low grade fever (99.1).  .

## 2018-07-22 NOTE — PLAN OF CARE
Problem: Patient Care Overview  Goal: Plan of Care/Patient Progress Review  Discharge Planner PT   Patient plan for discharge: Return to [facility]  Current status: Per discussion at interdisciplinary rounds, patient is currently mobilizing with SBA with RN staff. Pt has [facility] established [facility]. No acute care PT needs as patient is mobilizing well with RN staff and has discharge plan established. Will defer PT evaluation at this time and complete PT orders.  Barriers to return to prior living situation: None anticipated  Recommendations for discharge: Per medical team, return to [facility]  Rationale for recommendations: Per medical team       Entered by: Albina Tran 07/22/2018 11:06 AM

## 2018-07-22 NOTE — PLAN OF CARE
Problem: Patient Care Overview  Goal: Plan of Care/Patient Progress Review  Outcome: No Change   Observation goals PRIOR TO DISCHARGE     Comments: -diagnostic tests and consults completed and resulted:  Goal unmet  -vital signs normal or at patient baseline:  Goal unmet  Nurse to notify provider when observation goals have been met and patient is ready for discharge.

## 2018-07-22 NOTE — PLAN OF CARE
Problem: Patient Care Overview  Goal: Plan of Care/Patient Progress Review  Outcome: Improving  A/O. Up with SBA. VSS ex HTN and elevated Resp. Denies pain. LS diminished and crackles at the bases. Was given IS. BS active. Complains of being constipated despite a BM being charted yesterday. CMS intact. Tolerating diet. ,152.    -diagnostic tests and consults completed and resulted: No  -vital signs normal or at patient baseline: No  Nurse to notify provider when observation goals have been met and patient is ready for discharge.

## 2018-07-22 NOTE — PROGRESS NOTES
D: Patient is requesting a w/c ride at discharge. She verbalized understanding it may be private pay.

## 2018-07-22 NOTE — PROGRESS NOTES
-diagnostic tests and consults completed and resulted: No  -vital signs normal or at patient baseline: No  Nurse to notify provider when observation goals have been met and patient is ready for discharge

## 2018-07-22 NOTE — PROGRESS NOTES
Cross cover note     Systolic BP in 160s - 170s   Likely from pneumonia and hospitalization stress   Asymptomatic   PRN metoprolol, hydralazine IV ordered with parameters.  Heating pad for back pain.  Plan of care discussed with patient's nurse.

## 2018-07-22 NOTE — PROGRESS NOTES
[facility]    Hospitalist Progress Note    Assessment & Plan   The patient is an 83-year-old female with past medical history of atrial fibrillation, status post ablation, COPD, CKD stage III, type 2 diabetes, diastolic heart failure with preserved ejection fraction who is presenting to the emergency room today for the third time this week due to generalized weakness and sweats.  She is noted to have left lower lobe infiltrate on imaging and admission under observation requested due to her ongoing generalized weakness.       Left lower lobe infiltrate, likely community-acquired pneumonia.    Presented to ED 3x within week for generalized weakness with subjective fever & chills. Workup day of admission indicated probable community-acquired pneumonia in Left lower lobe. Pt on questioning does admit to intermittent shortness of breath, however denies cough/congestion. Pt is afebrile without leukocytosis or tachycardia to suggest systemic illness however did not feel comfortable discharging given her symptoms thus was admitted under observation status.  - Continue Levaquin 500mg daily  - Monitor oxygen status, supplemental PRN to maintain >90%  - Encourage ambulation, pulmonary toilet    Generalized weakness.    Likely due to above. Some report of possible GI bleeding with positive FOBT and abdominal discomfort, pt denies BRBPR or melena, noted to have hemorrhoids on exam. Hgb on admission 13.3, repeat of 12.2. With recent CT scan of the abdomen without any acute abnormality on 07/18/2018.  She also reports not eating well and poor oral intake over the last week which likely could contribute to this as well.    - PT consulted  - Treatment of above, continues on levaquin  - Monitor for signs of GI bleeding  - Repeat Hgb in AM  - PPI for GI prophylaxis    History of atrial fibrillation, status post ablation.  Will continue her prior to admission metoprolol b.i.d. and Eliquis.       History of  "hypertension.    Pt has been hypertensive to SBP 160s following admission, historically well controlled. Likely elevated in setting of acute illness.  - Continue PTA lisinopril 10mg BID, Metoprolol 25mg BID, ASA  - Holding PTA Lasix, will likely be able to resume in AM  - PRN hydralazine available for SBP > 180    History of chronic obstructive pulmonary disease.    No sign of acute exacerbation, lungs clear without wheeze on examination.  - Continue prior to admission Spiriva and albuterol inhaler p.r.n.     Type 2 diabetes.   Last hemoglobin A1c was 6.8% in 03/2018. PTA regimen consists of glipizide 5mg daily   - Holding PTA glipizide given poor po intake & hospitalization  - SSI    # Pain Assessment:  Current Pain Score 7/22/2018   Patient currently in pain? denies   Pain score (0-10) -   Pain location -   Pain descriptors -   Helene pickett pain level was assessed and she currently denies pain.      DVT Prophylaxis: on Eliquis  Code Status: DNR/DNI    Disposition: Expected discharge in 1 days pending clinical status. Pt does not feel comfortable discharging today given she has been in the ED multiple times for similar evaluations, does not feel safe leaving hospital. Notably, patient recently moved into an [facility] in [place] - states she has not had time to establish care with facility physician nor unpack bags. Therefore, social work was consulted to assist with discharge planning.    Kenrick Goodwin PA-C    Interval History   Pt seen & evaluated. States she continues to feel \"terrible\" and \"I'm not leaving until I feel better.\"    -Data reviewed today: I reviewed all new labs and imaging results over the last 24 hours. I personally reviewed the chest x-ray image(s) showing LLL infiltrate.    Physical Exam   Temp: 96.8  F (36  C) Temp src: Axillary BP: (!) 168/91 Pulse: 72 Heart Rate: 72 Resp: 22 SpO2: 96 % O2 Device: None (Room air)    There were no vitals filed for this visit.  Vital Signs with Ranges  Temp:  [95.8  F " (35.4  C)-99.1  F (37.3  C)] 96.8  F (36  C)  Pulse:  [71-72] 72  Heart Rate:  [70-72] 72  Resp:  [16-22] 22  BP: (168-177)/(84-99) 168/91  SpO2:  [92 %-99 %] 96 %       GEN: well-developed, well-nourished, appears comfortable  PULM: crackles present in left lung base, no increased work of breathing, no wheeze  CV: RRR, S1 & S2, no murmur  GI: soft, nontender, nondistended, +BS in all 4 quadrants  SKIN: warm & dry without rash, no pedal edema    Medications       apixaban ANTICOAGULANT  2.5 mg Oral BID     aspirin  81 mg Oral Daily     insulin aspart  1-7 Units Subcutaneous TID AC     insulin aspart  1-5 Units Subcutaneous At Bedtime     levofloxacin  500 mg Oral Daily     lisinopril  10 mg Oral BID     magnesium hydroxide  30 mL Oral At Bedtime     metoprolol tartrate  25 mg Oral BID     sodium chloride (PF)  3 mL Intracatheter Q8H     sucralfate  1 g Oral 4x Daily     tiotropium  18 mcg Inhalation QPM       Data     Recent Labs  Lab 07/22/18  0555 07/21/18  0800 07/18/18  1200 07/16/18  1718   WBC 6.8 7.2 7.9 6.3   HGB 12.2 13.3 13.0 12.5   MCV 95 96 94 95    223 181 177    133 129* 133   POTASSIUM 4.3 3.6 4.0 3.6   CHLORIDE 99 96 93* 94   CO2 26 30 27 29   BUN 14 13 17 18   CR 1.04 1.19* 1.11* 1.14*   ANIONGAP 8 7 9 10   GERARD 10.0 10.1 10.3* 10.0   * 191* 167* 204*   ALBUMIN  --  3.4 3.5 3.5   PROTTOTAL  --  7.5 7.6 7.6   BILITOTAL  --  1.1 1.0 1.1   ALKPHOS  --  75 79 78   ALT  --  16 17 18   AST  --  15 13 14   LIPASE  --   --  78 62*   TROPI  --  <0.015  --  <0.015       No results found for this or any previous visit (from the past 24 hour(s)).

## 2018-07-22 NOTE — PLAN OF CARE
Problem: Patient Care Overview  Goal: Plan of Care/Patient Progress Review  Outcome: Improving  diagnostic tests and consults completed and resulted: No  -vital signs normal or at patient baseline: No  Nurse to notify provider when observation goals have been met and patient is ready for discharge.

## 2018-07-22 NOTE — CONSULTS
Care Transition Initial Assessment - RN      Met with: Patient.  Patient states that she just recently moved into independent senior living at [facility] and does not want to have to increase her services there.  She is requsting to be discharged with [facility] services and would like to use [facility].  Will continue to follow and assist with discharge needs.  DATA   Active Problems:    Generalized weakness       Cognitive Status: alert and oriented.     Contact information and PCP information verified: Yes      Insurance concerns: No Insurance issues identified  ASSESSMENT  Patient currently receives the following services:  None      Identified issues/concerns regarding health management: Patient concerned that she has been into the emergency room 3 times in the past few months and would really like to be discharged with Home Care to potentially avoid a re-admission.  Patient has used [facility] in the past.    PLAN  Financial costs for the patient include Not assessed at this time.  Patient given options and choices for discharge yes.  Patient/family is agreeable to the plan?  Plan to be determined yet.  Patient anticipates discharging to home ([facility]) with [facility] .    Patient anticipates needs for home equipment: No  Plan/Disposition: Home   Appointments: not scheduled yet  Care  ([facility]) will continue to follow as needed.

## 2018-07-23 NOTE — DISCHARGE SUMMARY
[facility]  Discharge Summary        Helene Gibbs MRN# [id number]   YOB: 1935 Age: 83 year old     Date of Admission:  7/21/2018  Date of Discharge:  7/23/2018  Admitting Physician:  Wale Jeong MD  Discharge Physician:  Lucy Collier PA-C  Discharging Service:  Hospitalist    Primary Provider: Neisha Esparza [phone]     DISCHARGE DIAGNOSES/PROBLEM ORIENTED HOSPITAL COURSE:  Helene Gibbs was admitted on 7/21/2018 by Wale Jeong MD and I would refer you to their history and physical.  The following problems were addressed during her hospitalization:    HPI:  History is obtained from patient who is a good historian.  The patient is an 83-year-old female with past medical history of atrial fibrillation, status post ablation and pacemaker placement, type 2 diabetes, COPD, chronic kidney disease who is presenting for evaluation of generalized weakness.  The patient reports she has not been feeling well over the last 5-6 days.  She reports feeling weak, having sweats and decreased oral intake.  She also reports her stomach growls as well.  The patient was seen in the emergency room over the last 1 week on 07/18/2018 and on 07/16/2018.  During her ED visit, she had workup including a chest x-ray, CT abdomen and pelvis, and abdominal x-ray, which did not reveal acute abnormality.  As a result, patient was discharged home with followup with her primary care physician.  The patient now represents herself to the emergency room because she continues to not feel well.       The patient otherwise denies any nausea or vomiting.  The patient reports she has diarrhea which further goes and describes as a chronic thing for her.  She has a loose/watery bowel movement once a day every day and is chronic without any recent changes.  She states she may have noticed some stool color changes like her stool becoming alyse today, otherwise no bright red blood or dark  stool she noted today.  She denies any cough but feels like she has some phlegm in her throat.  She reports she feels short of breath just at rest but no other complaints.  She denies any chest pain.  She denies fever but as mentioned above reports sweats.  She denies any recent travel or sick contacts.       Left lower lobe infiltrate, likely community-acquired pneumonia.    Presented to ED 3x within week for generalized weakness with subjective fever & chills. Workup day of admission indicated probable community-acquired pneumonia in Left lower lobe. Pt on questioning does admit to intermittent shortness of breath, however denies cough/congestion. Pt is afebrile without leukocytosis or tachycardia to suggest systemic illness however did not feel comfortable discharging given her symptoms thus was admitted under observation status.  - Continue Levaquin, renally adjusted to 250 mg/d and will complete 7 day course  - Encourage ambulation, pulmonary toilet. Will add mucinex to help with cough  - Remained afebrile and O2 saturations stable on room air.     Generalized weakness.    Likely due to above. Some report of possible GI bleeding with positive FOBT and abdominal discomfort, pt denies BRBPR or melena, noted to have hemorrhoids on exam. Hgb on admission 13.3, repeat of 12.2. With recent CT scan of the abdomen without any acute abnormality on 07/18/2018.  She also reports not eating well and poor oral intake over the last week which likely could contribute to this as well.    - PT consulted  - Treatment of above, continues on levaquin  - Hgb stable and so sign of GI bleed.      History of atrial fibrillation, status post ablation.  Will continue her prior to admission metoprolol b.i.d. and Eliquis.        History of hypertension.    Pt has been hypertensive to SBP 160s following admission, historically well controlled. Likely elevated in setting of acute illness.  - Continue PTA lisinopril 10mg BID, Metoprolol 25mg  BID, ASA  - Resume lasix on discharge  - PRN hydralazine available for SBP > 180     History of chronic obstructive pulmonary disease.    No sign of acute exacerbation, lungs clear without wheeze on examination.  - Continue prior to admission Spiriva and albuterol inhaler p.r.n.      Type 2 diabetes.   Last hemoglobin A1c was 6.8% in 03/2018. PTA regimen consists of glipizide 5mg daily   - Resume Glipizide on discharge    Discharge Pain Plan:   - Patient currently has NO PAIN and is not being prescribed pain medications on discharge.      CODE STATUS:  DNR / DNI    BRIEF HOSPITAL STAY SUMMARY (SENT HOME WITH PATIENT IN AVS):   Reason for your hospital stay       You were admitted for evaluation of generalized weakness due to   pneumonia. Continue antibiotics. You will have home therapies to help you   get stronger.                      PENDING RESULTS:  Unresulted Labs Ordered in the Past 30 Days of this Admission     No orders found from 5/22/2018 to 7/22/2018.          DISCHARGE INSTRUCTIONS AND FOLLOW-UP:  Follow-up Appointments     Follow-up and recommended labs and tests        Follow up with primary care provider, Neisha Esparza, within 7 days for   hospital follow- up.  No follow up labs or test are needed.                    DISCHARGE DISPOSITION:  Discharged to home with home care    DISCHARGE MEDICATIONS:  Current Discharge Medication List      START taking these medications    Details   guaiFENesin (MUCINEX) 600 MG 12 hr tablet Take 1 tablet (600 mg) by mouth 2 times daily  Qty: 28 tablet, Refills: 0    Associated Diagnoses: Pneumonia due to infectious organism, unspecified laterality, unspecified part of lung      levofloxacin (LEVAQUIN) 250 MG tablet Take 1 tablet (250 mg) by mouth daily  Qty: 4 tablet, Refills: 0    Associated Diagnoses: Pneumonia due to infectious organism, unspecified laterality, unspecified part of lung         CONTINUE these medications which have NOT CHANGED    Details    albuterol (PROAIR HFA, PROVENTIL HFA, VENTOLIN HFA) 108 (90 BASE) MCG/ACT inhaler Inhale 2 puffs into the lungs every 4 hours as needed for shortness of breath / dyspnea or wheezing  Qty: 1 Inhaler, Refills: 5    Associated Diagnoses: COPD (chronic obstructive pulmonary disease) (H)      apixaban ANTICOAGULANT (ELIQUIS) 2.5 MG tablet Take 1 tablet (2.5 mg) by mouth 2 times daily  Qty: 180 tablet, Refills: 3    Associated Diagnoses: Atrial fibrillation, unspecified type (H)      aspirin EC 81 MG EC tablet Take 1 tablet (81 mg) by mouth daily    Associated Diagnoses: NSTEMI (non-ST elevated myocardial infarction) (H)      FUROSEMIDE PO Take 20 mg by mouth daily      GlipiZIDE (GLUCOTROL PO) Take 5 mg by mouth every morning (before breakfast)      hydrOXYzine (VISTARIL) 25 MG capsule Take 1 capsule (25 mg) by mouth 2 times daily as needed for itching  Qty: 180 capsule, Refills: 1    Associated Diagnoses: Itching      lisinopril (PRINIVIL/ZESTRIL) 10 MG tablet Take 1 tablet (10 mg) by mouth 2 times daily  Qty: 180 tablet, Refills: 1    Comments: Profile Rx: patient will contact pharmacy when needed  Associated Diagnoses: NSTEMI (non-ST elevated myocardial infarction) (H)      magnesium hydroxide (MILK OF MAGNESIA) 400 MG/5ML suspension Take 30 mLs by mouth At Bedtime       metoclopramide (REGLAN) 5 MG tablet Take 1 tablet (5 mg) by mouth 3 times daily as needed  Qty: 20 tablet, Refills: 0      metoprolol tartrate (LOPRESSOR) 25 MG tablet Take 1 tablet (25 mg) by mouth 2 times daily  Qty: 180 tablet, Refills: 3    Comments: Profile Rx: patient will contact pharmacy when needed  Associated Diagnoses: NSTEMI (non-ST elevated myocardial infarction) (H)      ondansetron (ZOFRAN ODT) 4 MG ODT tab Take 1 tablet (4 mg) by mouth every 6 hours as needed for nausea  Qty: 30 tablet, Refills: 1    Associated Diagnoses: Nausea      ranitidine (ZANTAC) 150 MG tablet TAKE ONE TABLET BY MOUTH TWO TIMES A DAY  Qty: 180 tablet, Refills:  "3    Associated Diagnoses: Dyspepsia      simvastatin (ZOCOR) 10 MG tablet TAKE 1 TABLET (10 MG) BY MOUTH AT BEDTIME  Qty: 90 tablet, Refills: 2    Associated Diagnoses: Type 2 diabetes mellitus with diabetic nephropathy, without long-term current use of insulin (H)      sucralfate (CARAFATE) 1 GM/10ML suspension Take 10 mLs (1 g) by mouth 4 times daily  Qty: 420 mL, Refills: 1      tiotropium (SPIRIVA) 18 MCG capsule Inhale 1 capsule (18 mcg) into the lungs every evening  Qty: 30 capsule, Refills: 11    Associated Diagnoses: Chronic obstructive pulmonary disease, unspecified COPD type (H)      traMADol (ULTRAM) 50 MG tablet Take 1 tablet (50 mg) by mouth every 6 hours as needed for severe pain  Qty: 60 tablet, Refills: 1    Comments: This request is for a new prescription for a controlled substance as required by Federal/State law.  Associated Diagnoses: Intractable back pain      ACCU-CHEK SMARTVIEW test strip USE 1 STRIP BY IN VITRO ROUTE 2 TIMES DAILY OR AS DIRECTED  Qty: 200 strip, Refills: 1    Associated Diagnoses: Type 2 diabetes mellitus with diabetic nephropathy (H)               CONSULTATIONS THIS HOSPITAL STAY:  None    CONDITION AND PHYSICAL EXAM ON DISCHARGE:  Discharge Condition: Stable    /81 (BP Location: Left arm)  Pulse 75  Temp 97.6  F (36.4  C) (Oral)  Resp 18  Ht 1.664 m (5' 5.5\")  LMP  (LMP Unknown)  SpO2 95%  Gen: sitting in bed, alert, cooperative and in no acute distress  HEENT: normocephalic; oropharynx clear  Card: RRR, S1, S2, no murmurs  Resp: lungs normal effort, cough with deep inspiration, no wheezing, crackles in left base  GI: abdomen soft, not-tender, non-distended, +BS  Ext: trace LE edema  Neuro: CX II-XII grossly in tact; ROM in all four extremities grossly in tact  Psych: alert and oriented x3; normal affect    DISCHARGE ORDERS FOR FACILITY:  After Care Instructions     Diet       Follow this diet upon discharge: Orders Placed This Encounter      Combination Diet " 1878-5716 Calories: Moderate Consistent CHO (4-6 CHO units/meal)                      Referrals     Future Labs/Procedures    Home care nursing referral     Comments:    RN skilled nursing visit. RN to assess vital signs and weight,   respiratory and cardiac status and hydration, nutrition and bowel status.  RN to teach medication management.      Your provider has ordered home care nursing services. If you have not been   contacted within 2 days of your discharge please call the inpatient   department phone number at [phone] .    Home Care OT Referral for Hospital Discharge     Comments:    OT to eval and treat    Your provider has ordered home care - occupational therapy. If you have   not been contacted within 2 days of your discharge please call the   department phone number listed on the top of this document.    Home Care PT Referral for Hospital Discharge     Comments:    PT to eval and treat    Your provider has ordered home care - physical therapy. If you have not   been contacted within 2 days of your discharge please call the department   phone number listed on the top of this document.        DISCHARGE TIME:  Greater than 30 minutes.    IMAGING RESULTS FROM THIS HOSPITAL STAY:  Results for orders placed or performed during the hospital encounter of 07/21/18   Chest XR,  PA & LAT    Narrative    CHEST TWO VIEWS 7/21/2018 8:38 AM     HISTORY: Shortness of breath. Weakness.     COMPARISON: 7/16/2018     FINDINGS: Obscuration of the left hemidiaphragm which is new since the  comparison study, compatible with a left lower lobe infiltrate.  Remaining lungs are clear. The cardiac silhouette is not enlarged.  Pulmonary vasculature is unremarkable.      Impression    IMPRESSION: Left lower lobe infiltrate.    MABLE VIZCARRA MD     *Note: Due to a large number of results and/or encounters for the requested time period, some results have not been displayed. A complete set of results can be found in Results  Review.       MOST RECENT LAB RESULTS:  Most Recent 3 CBC's:  Recent Labs   Lab Test  07/23/18   0719  07/22/18   0555  07/21/18   0800  07/18/18   1200   WBC   --   6.8  7.2  7.9   HGB  12.4  12.2  13.3  13.0   MCV   --   95  96  94   PLT   --   202  223  181      Most Recent 3 BMP's:  Recent Labs   Lab Test  07/22/18   0555  07/21/18   0800  07/18/18   1200   NA  133  133  129*   POTASSIUM  4.3  3.6  4.0   CHLORIDE  99  96  93*   CO2  26  30  27   BUN  14  13  17   CR  1.04  1.19*  1.11*   ANIONGAP  8  7  9   GERARD  10.0  10.1  10.3*   GLC  155*  191*  167*     Most Recent 3 Troponin's:  Recent Labs   Lab Test  07/21/18   0800  07/16/18   1718  05/22/18   1639   TROPI  <0.015  <0.015  <0.015     Most Recent 3 INR's:  Recent Labs   Lab Test  01/05/18   0800  01/04/18   1101  08/09/17   1720   INR  1.00  0.97  1.41*     Most Recent 2 LFT's:  Recent Labs   Lab Test  07/21/18   0800  07/18/18   1200   AST  15  13   ALT  16  17   ALKPHOS  75  79   BILITOTAL  1.1  1.0     Most Recent Cholesterol Panel:  Recent Labs   Lab Test  01/18/18   1129   CHOL  126   LDL  40   HDL  71   TRIG  76     Most Recent 6 Bacteria Isolates From Any Culture (See EPIC Reports for Culture Details):  Recent Labs   Lab Test  07/16/18   1740  04/16/18   1352  04/06/18   0820  10/19/16   2035  03/16/16   1330  01/02/16   1015   CULT  No growth  >100,000 colonies/mL  Escherichia coli  *  10,000 to 50,000 colonies/mL  mixed urogenital pal    10,000 to 50,000 colonies/mL mixed urogenital pal Susceptibility testing not   routinely done    50,000 to 100,000 colonies/mL mixed urogenital pal  Susceptibility testing not routinely done    Heavy growth Prevotella species Beta lactamase positive  Heavy growth Fusobacterium nucleatum  Susceptibility testing not routinely done  *  Light growth Enterococcus species  On day 1, isolated in broth only: Anaerobic gram negative rods See anaerobic   report for identification  *     Most Recent TSH, T4 and  HgbA1c:   Recent Labs   Lab Test  07/21/18   0800   04/25/17   1445   TSH   --    --   1.56   A1C  6.9*   < >  6.4*    < > = values in this interval not displayed.

## 2018-07-23 NOTE — PLAN OF CARE
"Problem: Patient Care Overview  Goal: Plan of Care/Patient Progress Review  Outcome: No Change   Observation goals PRIOR TO DISCHARGE     Comments: -diagnostic tests and consults completed and resulted: Goal met.   -vital signs normal or at patient baseline: Goal met.    Pt continues to feel weak, able to ambulate in room with SBA and walker. Denies dizziness. Small loose stool, c/o constipation and requesting milk of magnesia. Denies nausea. Mild POND. L lobe crackles. C/o feeling like she has something \"stuck in my throat,\" new orders received for mucinex. IS encouraged. Plan for probable discharge home with home care today.     Nurse to notify provider when observation goals have been met and patient is ready for discharge.              "

## 2018-07-23 NOTE — PROGRESS NOTES
EVAN  D) Patient has discharge orders for today. Home RN and therapies have been ordered.  I) RN Care Coordinator is making the home care referral to [facility], as requested by pt.  [facility] wheelchair transport, also requested by patient, for 1330 today. Updated patient and bedside RN on the discharge time. Patient is aware she will be billed for this ride.  A) Patient agrees to this plan.  P) Discharge home today at 1330 with [facility] to follow.

## 2018-07-23 NOTE — PROGRESS NOTES
Patient discharging today to home. Home Care has been ordered. Patient has chosen [facility]. Referral sent via email to [facility].

## 2018-07-23 NOTE — PLAN OF CARE
Problem: Patient Care Overview  Goal: Plan of Care/Patient Progress Review  Outcome: Improving  -diagnostic tests and consults completed and resulted   -vital signs normal or at patient baseline     Goals not yet met

## 2018-07-23 NOTE — PLAN OF CARE
Problem: Patient Care Overview  Goal: Discharge Needs Assessment  Outcome: Improving   Observation goals PRIOR TO DISCHARGE     Comments: -diagnostic tests and consults completed and resulted , met wnl  -vital signs normal or at patient baseline ,met  Vss, alert and oriented, c/o of tightness across upper abd. Slight sob with amb. Tolerating diet. Voiding without diff. Blood sugars w      Nurse to notify provider when observation goals have been met and patient is ready for discharge.

## 2018-07-23 NOTE — PROGRESS NOTES
Observation goals PRIOR TO DISCHARGE     Comments: -diagnostic tests and consults completed and resulted - met  -vital signs normal or at patient baseline - met  Nurse to notify provider when observation goals have been met and patient is ready for discharge.

## 2018-07-23 NOTE — PLAN OF CARE
Problem: Patient Care Overview  Goal: Plan of Care/Patient Progress Review  Outcome: Completed Date Met: 07/23/18  VSS on RA. Continues PO abx for pneumonia. Mucinex added today. Ambulation, pulmonary toilet promoted. Up with SBA and walker. Good appetite on mod CHO diet. Ultram for chronic pain. Continent bowel and bladder. Discharging home via w/c with home PT/OT/RN. All belongings sent at discharge.

## 2018-07-23 NOTE — PLAN OF CARE
"Problem: Patient Care Overview  Goal: Plan of Care/Patient Progress Review  Outcome: No Change  Pt alert/oriented x4, declines pain/discomfort. Complains of feeling \"like I have a fever\", but pt temp normal at 97 orally. Pt SBA with walker/GB, tolerating diet. Will continue to monitor. VSS      "

## 2018-07-24 NOTE — TELEPHONE ENCOUNTER
Chief Complaint: Pneumonia Due To Infectious Organism, Unspecified Laterality, Unspecified Part Of Lung, Community Acquired Pneum,7/23/18,ed/ip 4/1  [phone] (home) none (work)

## 2018-07-24 NOTE — PROGRESS NOTES
Clinic Care Coordination Contact  Care Coordination Communication         Clinical Data: Patient was hospitalized at [facility] from 7/21/18 to 7/23/18 with diagnosis of Pneumonia.     Home Care Contact:              Home Care Agency: [facility]               Contact name () and phone number: Dg  @ [phone]              Care Coordination contacted home care: Yes              Anticipated start of care date: 7/25/2018    Plan: RN/SW Care Coordinator will await notification from home care staff informing RN/SW Care Coordinator of patients discharge plans/needs. RN/SW Care Coordinator will review chart and outreach to home care every 4 weeks and as needed.      Tomasa Wagner RN  Clinic Care Coordinator  [phone]  [email or web address]

## 2018-07-24 NOTE — TELEPHONE ENCOUNTER
Called Dg with [facility]. No answer, left message informing her that Dr. Esparza will follow pt in .    Vincent ASTORGA RN

## 2018-07-24 NOTE — TELEPHONE ENCOUNTER
Reason for Call:  Home Health Care    Dg with [facility] called regarding (reason for call): Follow in HC    Orders are needed for this patient. None  Want confirmation that Dr Esparza will follow for HC    Phone Number Homecare Nurse can be reached at:   [phone]    Can we leave a detailed message on this number? YES    Best Time: anytime    Call taken on 7/24/2018 at 9:19 AM by Betzaida Fisher

## 2018-07-24 NOTE — PROGRESS NOTES
Late Entry  [facility]  Due to a high volume of referrals [facility] has requested this patient's home care episode be transferred to their deferral partner [facility], [facility]. [phone].  Care team and patient notified.

## 2018-07-25 NOTE — TELEPHONE ENCOUNTER
Detailed message left for Dg with [facility] asking that she contact patient ASAP as patient is calling to inquire about when home care is going to follow up with her    Also called main number for [facility] ([facility] - [phone]). They gave me a different number to call - [phone] - [employer] within [facility] ([facility]). States is not a client with the [employer] of [facility] but they will call patient (gave her pt's number) and figure out who will be calling her to set up home care    Shawanda REID RN

## 2018-07-25 NOTE — TELEPHONE ENCOUNTER
Reason for Call:  Home Health Care    [place] with Home Health care [facility]. Homecare called regarding (reason for call): Orders    Orders are needed for this patient. Add to Current Order  Home Health Aide 2 x week for 60ds      Pt Provider:     Phone Number Homecare Nurse can be reached at: [phone]    Can we leave a detailed message on this number? YES        Call taken on 7/25/2018 at 4:37 PM by Josue Gomes

## 2018-07-25 NOTE — TELEPHONE ENCOUNTER
*will leave in basket for now as reminder to follow up with patient to verify if home care got in contact with her*  Shawanda REID RN

## 2018-07-25 NOTE — TELEPHONE ENCOUNTER
Patient called this morning.  She is very upset that no one has called her to set up home care for her!    She came home from the hospital on Monday, and is still waiting for some one to contact her.  Can Dr. Esparza or her nurse call them to get things moving?    Patient can be reached at:  [phone]  Ok to leave a detailed message at this number

## 2018-07-26 NOTE — TELEPHONE ENCOUNTER
Helene was being seen by [place] [facility]- Tati  for private pay x years.  Now she is being seen by [facility] through medicare.    Helene wants Tati to visit and will pay out of pocket.  Tati just needs verbal ok. Verbal ok given. She is not going to do nursing cares Heleen just wants to confer with her.     Alondra Jiménez RN- Triage [employer]

## 2018-07-26 NOTE — TELEPHONE ENCOUNTER
Reason for Call:  Home Health Care    Tati with [facility] called regarding (reason for call): To discuss private pay homecare per Pt's request.     Orders are needed for this patient.    Private pay HC today per Pt's request    Pt Provider: Dr. Esparza    Phone Number Homecare Nurse can be reached at: [phone]    Can we leave a detailed message on this number? YES    Best Time: ASAP    Call taken on 7/26/2018 at 2:20 PM by Leena Garcia

## 2018-08-02 NOTE — PROGRESS NOTES
Clinic Care Coordination Contact  Outreach Summary    Referral: Patient would like to know if there is a transportation van that could take her to her appointments. Patient has medicare.    Data: Writer called patient yesterday, 8/2/18. Patient said that she has her own  the  does not take patient all the way into the hospital or clinic, and patient is concerned about getting into the hospital/clinic if/when patient needs a wheelchair to get to her appointments, due to health reasons. Patient said that her upcoming appointment is in the hospital building.    Writer told patient that--in the hospital--there would be a wheelchair easily available and staff who could wheel her where she needs to go in the building. Writer told patient that writer can mail information regarding wheelchair vans that patient could hire.    Patient is not interested in [employer].    Writer mailed patient a transportation list today.     Plan: [facility] does not plan further outreach to patient.    NICOLE Esposito  [facility] Care Coordination  Clinic: [facility]   Email: [email or web address]  Tele: [phone]

## 2018-08-02 NOTE — TELEPHONE ENCOUNTER
Reason for Call:  Other call back    Detailed comments: Patient would like to know if there is a transportation van that could take her to her appointments. Patients has medicare.      Phone Number Patient can be reached at: Home number on file [phone] (home)    Best Time: anytime     Can we leave a detailed message on this number? YES    Call taken on 8/2/2018 at 8:41 AM by Elena Dudley

## 2018-08-02 NOTE — TELEPHONE ENCOUNTER
[facility] can you please check this out for her or forward to someone who can??  Thank you,  Radha Stiles RN

## 2018-08-06 NOTE — PROGRESS NOTES
HPI and Plan:   See dictation    Orders Placed This Encounter   Procedures     Follow-Up with Cardiac Advanced Practice Provider       No orders of the defined types were placed in this encounter.      Medications Discontinued During This Encounter   Medication Reason     levofloxacin (LEVAQUIN) 250 MG tablet Therapy completed         Encounter Diagnoses   Name Primary?     NSTEMI (non-ST elevated myocardial infarction) (H)      Acute on chronic diastolic congestive heart failure (H)      Permanent atrial fibrillation (H)      CKD (chronic kidney disease) stage 3, GFR 30-59 ml/min        CURRENT MEDICATIONS:  Current Outpatient Prescriptions   Medication Sig Dispense Refill     albuterol (PROAIR HFA, PROVENTIL HFA, VENTOLIN HFA) 108 (90 BASE) MCG/ACT inhaler Inhale 2 puffs into the lungs every 4 hours as needed for shortness of breath / dyspnea or wheezing 1 Inhaler 5     apixaban ANTICOAGULANT (ELIQUIS) 2.5 MG tablet Take 1 tablet (2.5 mg) by mouth 2 times daily 180 tablet 3     aspirin EC 81 MG EC tablet Take 1 tablet (81 mg) by mouth daily       diazepam 10 mg SUPP Insert one suppository vaginally at bedtime as needed 30 suppository 1     FUROSEMIDE PO Take 20 mg by mouth daily       GlipiZIDE (GLUCOTROL PO) Take 5 mg by mouth every morning (before breakfast)       guaiFENesin (MUCINEX) 600 MG 12 hr tablet Take 1 tablet (600 mg) by mouth 2 times daily 28 tablet 0     hydrOXYzine (VISTARIL) 25 MG capsule Take 1 capsule (25 mg) by mouth 2 times daily as needed for itching 180 capsule 1     lisinopril (PRINIVIL/ZESTRIL) 10 MG tablet Take 1 tablet (10 mg) by mouth 2 times daily 180 tablet 1     magnesium hydroxide (MILK OF MAGNESIA) 400 MG/5ML suspension Take 30 mLs by mouth At Bedtime        metoclopramide (REGLAN) 5 MG tablet Take 1 tablet (5 mg) by mouth 3 times daily as needed 20 tablet 0     metoprolol tartrate (LOPRESSOR) 25 MG tablet Take 1 tablet (25 mg) by mouth 2 times daily 180 tablet 3     ondansetron  (ZOFRAN ODT) 4 MG ODT tab Take 1 tablet (4 mg) by mouth every 6 hours as needed for nausea 30 tablet 1     ranitidine (ZANTAC) 150 MG tablet TAKE ONE TABLET BY MOUTH TWO TIMES A  tablet 3     simvastatin (ZOCOR) 10 MG tablet TAKE 1 TABLET (10 MG) BY MOUTH AT BEDTIME 90 tablet 2     sucralfate (CARAFATE) 1 GM/10ML suspension Take 10 mLs (1 g) by mouth 4 times daily 420 mL 1     tiotropium (SPIRIVA) 18 MCG capsule Inhale 1 capsule (18 mcg) into the lungs every evening 30 capsule 11     traMADol (ULTRAM) 50 MG tablet Take 1 tablet (50 mg) by mouth every 6 hours as needed for severe pain 60 tablet 1     LawPivotU-CHEK SMARTVIEW test strip USE 1 STRIP BY IN VITRO ROUTE 2 TIMES DAILY OR AS DIRECTED 200 strip 1       ALLERGIES     Allergies   Allergen Reactions     Ambien [Zolpidem Tartrate] Visual Disturbance     Hallucinations and fell out of bed at night.     Penicillins Hives     Definity      Caused a syncopal episode.     Sulfa Drugs Itching     Cymbalta Rash     Fluconazole Rash       PAST MEDICAL HISTORY:  Past Medical History:   Diagnosis Date     Anemia      Ankle arthritis      Arthritis      Back pain      Bell's palsy 9/08    left     Breast CA (H) 2004-    left lumpectomy, radiation, -recurrence     CKD (chronic kidney disease)     stage 3 baseline Cr 1.3     Colon polyps     colonoscopy-9/12-next due in 9/13     Congestive heart failure (H)      COPD (chronic obstructive pulmonary disease) (H)      Coronary artery disease      DM (diabetes mellitus) (H)      GERD (gastroesophageal reflux disease)      Hyperlipidemia      Hypertension      Lumbar spinal stenosis     use cane     Nicotine dependence      Obesity      Osteoporosis      Other chronic pain      Pacemaker      Permanent atrial fibrillation (H) 8/2008    S/P AV node ablation and pacemaker 10-25-12       Pleural effusion      Pulmonary hypertension      PVD (peripheral vascular disease) (H)      Rectal stenosis      Tobacco abuse     current      Tricuspid regurgitation        PAST SURGICAL HISTORY:  Past Surgical History:   Procedure Laterality Date     ARTHROSCOPY SHOULDER RT/LT  ,     Bilateral, right then left     BONE MARROW BIOPSY, BONE SPECIMEN, NEEDLE/TROCAR N/A 2017    Procedure: BIOPSY BONE MARROW;  BONE MARROW BIOPSY;  Surgeon: Marino Cohen MD;  Location:  GI     C DEXA, BONE DENSITY, AXIAL SKEL       C MAMMOGRAM, SCREENING  2009     COLONOSCOPY N/A 10/7/2016    Procedure: COMBINED COLONOSCOPY, SINGLE OR MULTIPLE BIOPSY/POLYPECTOMY BY BIOPSY;  Surgeon: Cassandra Mccord MD;  Location:  GI     ESOPHAGOSCOPY, GASTROSCOPY, DUODENOSCOPY (EGD), COMBINED N/A 8/10/2017    Procedure: COMBINED ESOPHAGOSCOPY, GASTROSCOPY, DUODENOSCOPY (EGD), BIOPSY SINGLE OR MULTIPLE;  gastroscopy;  Surgeon: Helene Bustamante MD;  Location:  GI     H ABLATION AV NODE  10/2012     HC COLONOSCOPY THRU STOMA, DIAGNOSTIC  ?      IMPLANT PACEMAKER  10/2012    [place] [id number], [id number]     JOINT REPLACEMTN, KNEE RT/LT      Joint Replacement knee bilateral     LAPAROSCOPIC CHOLECYSTECTOMY WITH CHOLANGIOGRAMS N/A 2015    Procedure: LAPAROSCOPIC CHOLECYSTECTOMY WITH CHOLANGIOGRAMS;  Surgeon: Ervin Amos MD;  Location:  OR     LUMPECTOMY BREAST      Left-     PHACOEMULSIFICATION CLEAR CORNEA WITH STANDARD INTRAOCULAR LENS IMPLANT Left 2015    Procedure: PHACOEMULSIFICATION CLEAR CORNEA WITH STANDARD INTRAOCULAR LENS IMPLANT;  Surgeon: Sai Obregon MD;  Location:  EC     PHACOEMULSIFICATION CLEAR CORNEA WITH TORIC INTRAOCULAR LENS IMPLANT Right 2017    Procedure: PHACOEMULSIFICATION CLEAR CORNEA WITH TORIC INTRAOCULAR LENS IMPLANT;  RIGHT EYE PHACOEMULSIFICATION CLEAR CORNEA WITH TORIC INTRAOCULAR LENS IMPLANT ;  Surgeon: Duc Richmond MD;  Location: [facility]       FAMILY HISTORY:  Family History   Problem Relation Age of Onset     Hypertension Mother      Diabetes Mother       at 83 yrs     C.A.D.  Father       age 70s     Breast Cancer No family hx of      Colon Cancer No family hx of        SOCIAL HISTORY:  Social History     Social History     Marital status:      Spouse name: N/A     Number of children: N/A     Years of education: N/A     Social History Main Topics     Smoking status: Former Smoker     Packs/day: 0.25     Years: 45.00     Types: Cigarettes     Smokeless tobacco: Never Used     Alcohol use No     Drug use: No     Sexual activity: Not Currently     Partners: Female     Other Topics Concern     Caffeine Concern Yes     2 cups/day     Sleep Concern Yes     Stress Concern Yes     Weight Concern Yes     15+ lb weight loss since hospitalization      Special Diet Yes     bland diet r/t cholecystectomy, low salt      Exercise No     Seat Belt Yes     Social History Narrative    ,no childrenPOA- niece-Enedelia Claros-has plans to quitETOH-1/drink 2-3 /weekExercise-sit down DNR, DNI    Lived in [place] and [place] worked in [employer]       Review of Systems:  Skin:  Negative       Eyes:  Positive for glasses reading  ENT:  Negative      Respiratory:  Positive for shortness of breath;dyspnea on exertion     Cardiovascular:  Negative      Gastroenterology: Negative      Genitourinary:  not assessed      Musculoskeletal:  Positive for back pain;arthritis;joint pain pain in right rib area  Neurologic:  Positive for numbness or tingling of feet    Psychiatric:  Positive for sleep disturbances    Heme/Lymph/Imm:  Positive for allergies    Endocrine:  Positive for diabetes      [id number]

## 2018-08-06 NOTE — LETTER
"8/6/2018      Neisha Esparza MD  [address] [facility] [address]  [place] [address]      RE: Helene Gibbs       Dear Colleague,    I had the pleasure of seeing Helene Gibbs in the [facility].    Service Date: 08/06/2018      HISTORY OF PRESENT ILLNESS:  It was my pleasure to see Ms. Helene Gibbs, a pleasant 83-year-old woman, for the following issues:   1.  Permanent atrial fibrillation with AV node ablation and pacemaker implantation in 2012.  She is on apixaban for anticoagulation.   2.  Chronic diastolic heart failure or HFpEF.  She had hospitalization with volume overload in 05/2018.   3.  What was called a \"non-STEMI\" in 05/2008.  Given troponin elevation of 0.7.  The patient had a nuclear stress test that showed mild apical ischemia.  Treated medically.   4.  Recent hospitalization with pneumonia.   5.  Hypertension.   6.  Tricuspid regurgitation and mild pulmonary hypertension.      Ms. Gibbs was recently hospitalized with pneumonia.  She told me it took 3 separate ER visits in the month of July before she was diagnosed with pneumonia.  She tells me that she is wiped out and still recovering from this.  She is disappointed that the pneumonia was not diagnosed earlier and she had to suffer so much.  She was also hospitalized in 05/2018 with congestive heart failure.  She was disappointed because she was not told at that time that she had a \"heart attack.\"  However, later she was told that she had a heart attack.  She was confused and unhappy with this discrepancy.  I note that the \"heart attack\" or non-STEMI label was applied for a troponin elevation 0.7.      Ms. Gibbs has lost weight and tells me that her appetite has not returned to normal after her recent hospitalization.  She is watching her salt intake and her weight is very close to her dry weight, which is about 150 pounds.  She tells me she feels best when her weight is around 150 pounds.  She has had no chest " pain, syncope, near-syncope.  Device interrogations have been performed on a regular basis in our clinic and her device functions well.  Most recent check was actually earlier today.  Estimated battery longevity is 9 years.      PHYSICAL EXAMINATION:   VITAL SIGNS:  Blood pressure 124/66, pulse 66 and regular, weight 67 kg.  Height 167 cm.   GENERAL:  She is a pleasant, elderly woman with moderate kyphosis.  There is no apparent distress.   HEENT:  Normocephalic.   NECK:  Supple with no obvious jugular venous distention.   LUNGS:  With mildly decreased breath sounds.   CARDIOVASCULAR:  Regular paced rhythm, 1/6 systolic murmur at the xiphoid and toward the apex.  No rub.   ABDOMEN:  Soft, nontender.   EXTREMITIES:  Prominent venous varicosities, trace edema.      IMPRESSION:   1.  Chronic diastolic heart failure.   2.  Acute-on-chronic exacerbation in 05/2018.  Ms. Gibbs appears euvolemic today.  She is on her chronic maintenance dose of furosemide of 120 mg daily and she typically does well with this.  Her blood pressure is under good control as well.  I did not suggest changes.  I emphasized the importance of letting us know if her weight starts creeping up, especially if it passes the 154 pounds threshold.  She should then call us to adjust her diuretics and hopefully prevent hospitalization.   3.  Permanent atrial fibrillation.  She has had previous AV node ablation.  Her device functions well.  She is tolerating Eliquis.  Her current dose is 2.5 mg b.i.d.  This is a reasonable, though borderline since her weight is slightly over 60 kg.  I did not make changes.      RECOMMENDATIONS:   1.  Continue current cardiac medications.     2.  I requested followup with Angelica in 6 months.  In the future, it would be best for her to be seen by General Cardiology.  I also note by Dr. Hawk is very familiar with the patient.  I think she needs more of a heart failure cardiology physician rather than electrophysiologist at  this point.      Thank you for the opportunity to be a part of her care.      cc:      Neisha Esparza MD    [facility]   [address]   [place] [address]         CARSON VILLALPANDO MD             D: 2018   T: 2018   MT: BLANQUITA      Name:     PATRICIA BOBO   MRN:      [id number]        Account:      [id number]   :      1935           Service Date: 2018      Document: [id number]         Outpatient Encounter Prescriptions as of 2018   Medication Sig Dispense Refill     albuterol (PROAIR HFA, PROVENTIL HFA, VENTOLIN HFA) 108 (90 BASE) MCG/ACT inhaler Inhale 2 puffs into the lungs every 4 hours as needed for shortness of breath / dyspnea or wheezing 1 Inhaler 5     apixaban ANTICOAGULANT (ELIQUIS) 2.5 MG tablet Take 1 tablet (2.5 mg) by mouth 2 times daily 180 tablet 3     aspirin EC 81 MG EC tablet Take 1 tablet (81 mg) by mouth daily       diazepam 10 mg SUPP Insert one suppository vaginally at bedtime as needed 30 suppository 1     FUROSEMIDE PO Take 20 mg by mouth daily       GlipiZIDE (GLUCOTROL PO) Take 5 mg by mouth every morning (before breakfast)       guaiFENesin (MUCINEX) 600 MG 12 hr tablet Take 1 tablet (600 mg) by mouth 2 times daily 28 tablet 0     hydrOXYzine (VISTARIL) 25 MG capsule Take 1 capsule (25 mg) by mouth 2 times daily as needed for itching 180 capsule 1     lisinopril (PRINIVIL/ZESTRIL) 10 MG tablet Take 1 tablet (10 mg) by mouth 2 times daily 180 tablet 1     magnesium hydroxide (MILK OF MAGNESIA) 400 MG/5ML suspension Take 30 mLs by mouth At Bedtime        metoclopramide (REGLAN) 5 MG tablet Take 1 tablet (5 mg) by mouth 3 times daily as needed 20 tablet 0     metoprolol tartrate (LOPRESSOR) 25 MG tablet Take 1 tablet (25 mg) by mouth 2 times daily 180 tablet 3     ondansetron (ZOFRAN ODT) 4 MG ODT tab Take 1 tablet (4 mg) by mouth every 6 hours as needed for nausea 30 tablet 1     ranitidine (ZANTAC) 150 MG tablet TAKE ONE TABLET  BY MOUTH TWO TIMES A  tablet 3     simvastatin (ZOCOR) 10 MG tablet TAKE 1 TABLET (10 MG) BY MOUTH AT BEDTIME 90 tablet 2     sucralfate (CARAFATE) 1 GM/10ML suspension Take 10 mLs (1 g) by mouth 4 times daily 420 mL 1     tiotropium (SPIRIVA) 18 MCG capsule Inhale 1 capsule (18 mcg) into the lungs every evening 30 capsule 11     traMADol (ULTRAM) 50 MG tablet Take 1 tablet (50 mg) by mouth every 6 hours as needed for severe pain 60 tablet 1     ACCU-CHEK SMARTVIEW test strip USE 1 STRIP BY IN VITRO ROUTE 2 TIMES DAILY OR AS DIRECTED 200 strip 1     [DISCONTINUED] levofloxacin (LEVAQUIN) 250 MG tablet Take 1 tablet (250 mg) by mouth daily 4 tablet 0     No facility-administered encounter medications on file as of 8/6/2018.        Again, thank you for allowing me to participate in the care of your patient.      Sincerely,    Jc Juarez MD     [facility]

## 2018-08-06 NOTE — MR AVS SNAPSHOT
After Visit Summary   8/6/2018    Helene Gibbs    MRN: [id number]           Patient Information     Date Of Birth          1935        Visit Information        Provider Department      8/6/2018 2:00 PM [facility] DCR2 [facility]        Today's Diagnoses     Cardiac pacemaker in situ    -  1    S/P AV alexandra ablation           Follow-ups after your visit        Your next 10 appointments already scheduled     Aug 06, 2018  3:15 PM CDT   [facility] EP RETURN with Jc Juarez MD   [facility] ([facility])    [address] [place] [id number] [phone] OPT 2            Aug 09, 2018  1:00 PM CDT   Office Visit with Neisha Esparza MD   [facility]    [address] [facility] [id number] [phone]           Bring a current list of meds and any records pertaining to this visit. For Physicals, please bring immunization records and any forms needing to be filled out. Please arrive 10 minutes early to complete paperwork.            Nov 09, 2018  1:45 PM CST   [place] with STONE TECH1   [facility] ([facility])    [address] [id number] [phone] OPT 2              Who to contact     If you have questions or need follow up information about today's clinic visit or your schedule please contact [facility] directly at [phone].  Normal or non-critical lab and imaging results will be communicated to you by MyChart, letter or phone within 4 business days after the clinic has received the results. If you do not hear from us within 7 days, please contact the clinic through MyChart or phone. If you have a critical or abnormal lab result, we will notify you by phone as soon as possible.  Submit refill requests through [employer] or call  your pharmacy and they will forward the refill request to us. Please allow 3 business days for your refill to be completed.          Additional Information About Your Visit        [employer] Information     [employer] gives you secure access to your electronic health record. If you see a primary care provider, you can also send messages to your care team and make appointments. If you have questions, please call your primary care clinic.  If you do not have a primary care provider, please call [phone] and they will assist you.        Care EveryWhere ID     This is your Care EveryWhere ID. This could be used by other organizations to access your [facility] medical records  [id number]        Your Vitals Were     Last Period                   (LMP Unknown)            Blood Pressure from Last 3 Encounters:   07/23/18 156/81   07/18/18 160/78   07/17/18 157/90    Weight from Last 3 Encounters:   07/18/18 76.7 kg (169 lb)   07/17/18 72.1 kg (159 lb)   06/20/18 73.5 kg (162 lb)              We Performed the Following     PM DEVICE PROGRAMMING EVAL, DUAL LEAD PACER ([id number])        Primary Care Provider Office Phone # Fax #    Neisha MARIMAR Esparza [phone] [address]  [place]                [place] [address]        Goals        Diet    Have 3 meals a day     Notes - Note created  5/23/2018 10:20 AM by Crystal Burciaga, RN    Goal Statement: I will try to eat 3 small meals a day   Measure of Success: PCP will continue to weigh the patient at her appointments and monitor for increased weight loss.   Supportive Steps to Achieve: Education on the importance of eating 3 meals day.   Barriers: Loss of appetite stated by the patient.   Strengths: Patient moving to [facility] in July with meals included.   Date to Achieve By: August 1, 2018          General    Healthy Eating (pt-stated)     Pain Management (pt-stated)     Pain Management (pt-stated)     Notes - Note created  5/23/2018 10:17 AM by Crystal Burciaga,  RN    Goal Statement: I will continue to take my pain medication as prescribed. I will call my physician if the pain gets worse.   Measure of Success: Patient will stay out of the ED. Patient will call her PCP if needed. RN CC will continue to call the patient to assess how her pain is.   Supportive Steps to Achieve: RN CC will reach out monthly to assess pain. Patient will continue to keep her appointments.   Strengths: Good PCP support. Appointment scheduled with [facility].   Date to Achieve By: August 1, 2018      start date: 4/17/14 I will weigh myself daily and if any weight gain or shortness of breath I will call the clinic. (pt-stated)     Notes - Note created  4/17/2014  3:59 PM by Margareth Pichardo RN    As of today's date 4/17/2014 goal is met at 0 - 25%.   Goal Status:  Active        Equal Access to Services     BALWINDERSt. Jude Medical CenterMARIMAR : Yariel sarabia Somariia, waaxda luqadaha, qaybta kaalmamaile castañeda, pamela mccullough . So [facility] [phone].    ATENCIÓN: Si habla español, tiene a gupta disposición servicios gratuitos de asistencia lingüística. Llame al [phone].    We comply with applicable federal civil rights laws and [place] laws. We do not discriminate on the basis of race, color, national origin, age, disability, sex, sexual orientation, or gender identity.            Thank you!     Thank you for choosing [facility]  for your care. Our goal is always to provide you with excellent care. Hearing back from our patients is one way we can continue to improve our services. Please take a few minutes to complete the written survey that you may receive in the mail after your visit with us. Thank you!             Your Updated Medication List - Protect others around you: Learn how to safely use, store and throw away your medicines at www.disposemymeds.org.          This list is accurate as of 8/6/18  2:11 PM.  Always use your most recent med list.                    Brand Name Dispense Instructions for use Diagnosis    ACCU-CHEK SMARTVIEW test strip   Generic drug:  blood glucose monitoring     200 strip    USE 1 STRIP BY IN VITRO ROUTE 2 TIMES DAILY OR AS DIRECTED    Type 2 diabetes mellitus with diabetic nephropathy (H)       albuterol 108 (90 Base) MCG/ACT Inhaler    PROAIR HFA/PROVENTIL HFA/VENTOLIN HFA    1 Inhaler    Inhale 2 puffs into the lungs every 4 hours as needed for shortness of breath / dyspnea or wheezing    COPD (chronic obstructive pulmonary disease) (H)       apixaban ANTICOAGULANT 2.5 MG tablet    ELIQUIS    180 tablet    Take 1 tablet (2.5 mg) by mouth 2 times daily    Atrial fibrillation, unspecified type (H)       aspirin 81 MG EC tablet      Take 1 tablet (81 mg) by mouth daily    NSTEMI (non-ST elevated myocardial infarction) (H)       diazepam 10 mg Supp     30 suppository    Insert one suppository vaginally at bedtime as needed    Vaginal pain       FUROSEMIDE PO      Take 20 mg by mouth daily        GLUCOTROL PO      Take 5 mg by mouth every morning (before breakfast)        guaiFENesin 600 MG 12 hr tablet    MUCINEX    28 tablet    Take 1 tablet (600 mg) by mouth 2 times daily    Pneumonia due to infectious organism, unspecified laterality, unspecified part of lung       hydrOXYzine 25 MG capsule    VISTARIL    180 capsule    Take 1 capsule (25 mg) by mouth 2 times daily as needed for itching    Itching       levofloxacin 250 MG tablet    LEVAQUIN    4 tablet    Take 1 tablet (250 mg) by mouth daily    Pneumonia due to infectious organism, unspecified laterality, unspecified part of lung       lisinopril 10 MG tablet    PRINIVIL/ZESTRIL    180 tablet    Take 1 tablet (10 mg) by mouth 2 times daily    NSTEMI (non-ST elevated myocardial infarction) (H)       magnesium hydroxide 400 MG/5ML suspension    MILK OF MAGNESIA     Take 30 mLs by mouth At Bedtime        metoclopramide 5 MG tablet    REGLAN    20 tablet    Take 1 tablet (5 mg)  by mouth 3 times daily as needed        metoprolol tartrate 25 MG tablet    LOPRESSOR    180 tablet    Take 1 tablet (25 mg) by mouth 2 times daily    NSTEMI (non-ST elevated myocardial infarction) (H)       ondansetron 4 MG ODT tab    ZOFRAN ODT    30 tablet    Take 1 tablet (4 mg) by mouth every 6 hours as needed for nausea    Nausea       ranitidine 150 MG tablet    ZANTAC    180 tablet    TAKE ONE TABLET BY MOUTH TWO TIMES A DAY    Dyspepsia       simvastatin 10 MG tablet    ZOCOR    90 tablet    TAKE 1 TABLET (10 MG) BY MOUTH AT BEDTIME    Type 2 diabetes mellitus with diabetic nephropathy, without long-term current use of insulin (H)       sucralfate 1 GM/10ML suspension    CARAFATE    420 mL    Take 10 mLs (1 g) by mouth 4 times daily        tiotropium 18 MCG capsule    SPIRIVA    30 capsule    Inhale 1 capsule (18 mcg) into the lungs every evening    Chronic obstructive pulmonary disease, unspecified COPD type (H)       traMADol 50 MG tablet    ULTRAM    60 tablet    Take 1 tablet (50 mg) by mouth every 6 hours as needed for severe pain    Intractable back pain

## 2018-08-06 NOTE — LETTER
8/6/2018    Neisha Esparza MD  [address]  [facility] [address]    RE: Helene Gibbs       Dear Colleague,    I had the pleasure of seeing Helene Gibbs in the [facility].    HPI and Plan:   See dictation    Orders Placed This Encounter   Procedures     Follow-Up with Cardiac Advanced Practice Provider       No orders of the defined types were placed in this encounter.      Medications Discontinued During This Encounter   Medication Reason     levofloxacin (LEVAQUIN) 250 MG tablet Therapy completed         Encounter Diagnoses   Name Primary?     NSTEMI (non-ST elevated myocardial infarction) (H)      Acute on chronic diastolic congestive heart failure (H)      Permanent atrial fibrillation (H)      CKD (chronic kidney disease) stage 3, GFR 30-59 ml/min        CURRENT MEDICATIONS:  Current Outpatient Prescriptions   Medication Sig Dispense Refill     albuterol (PROAIR HFA, PROVENTIL HFA, VENTOLIN HFA) 108 (90 BASE) MCG/ACT inhaler Inhale 2 puffs into the lungs every 4 hours as needed for shortness of breath / dyspnea or wheezing 1 Inhaler 5     apixaban ANTICOAGULANT (ELIQUIS) 2.5 MG tablet Take 1 tablet (2.5 mg) by mouth 2 times daily 180 tablet 3     aspirin EC 81 MG EC tablet Take 1 tablet (81 mg) by mouth daily       diazepam 10 mg SUPP Insert one suppository vaginally at bedtime as needed 30 suppository 1     FUROSEMIDE PO Take 20 mg by mouth daily       GlipiZIDE (GLUCOTROL PO) Take 5 mg by mouth every morning (before breakfast)       guaiFENesin (MUCINEX) 600 MG 12 hr tablet Take 1 tablet (600 mg) by mouth 2 times daily 28 tablet 0     hydrOXYzine (VISTARIL) 25 MG capsule Take 1 capsule (25 mg) by mouth 2 times daily as needed for itching 180 capsule 1     lisinopril (PRINIVIL/ZESTRIL) 10 MG tablet Take 1 tablet (10 mg) by mouth 2 times daily 180 tablet 1     magnesium hydroxide (MILK OF MAGNESIA) 400 MG/5ML suspension Take 30 mLs by mouth At  Bedtime        metoclopramide (REGLAN) 5 MG tablet Take 1 tablet (5 mg) by mouth 3 times daily as needed 20 tablet 0     metoprolol tartrate (LOPRESSOR) 25 MG tablet Take 1 tablet (25 mg) by mouth 2 times daily 180 tablet 3     ondansetron (ZOFRAN ODT) 4 MG ODT tab Take 1 tablet (4 mg) by mouth every 6 hours as needed for nausea 30 tablet 1     ranitidine (ZANTAC) 150 MG tablet TAKE ONE TABLET BY MOUTH TWO TIMES A  tablet 3     simvastatin (ZOCOR) 10 MG tablet TAKE 1 TABLET (10 MG) BY MOUTH AT BEDTIME 90 tablet 2     sucralfate (CARAFATE) 1 GM/10ML suspension Take 10 mLs (1 g) by mouth 4 times daily 420 mL 1     tiotropium (SPIRIVA) 18 MCG capsule Inhale 1 capsule (18 mcg) into the lungs every evening 30 capsule 11     traMADol (ULTRAM) 50 MG tablet Take 1 tablet (50 mg) by mouth every 6 hours as needed for severe pain 60 tablet 1     OpenDriveU-AthosVIEW test strip USE 1 STRIP BY IN VITRO ROUTE 2 TIMES DAILY OR AS DIRECTED 200 strip 1       ALLERGIES     Allergies   Allergen Reactions     Ambien [Zolpidem Tartrate] Visual Disturbance     Hallucinations and fell out of bed at night.     Penicillins Hives     Definity      Caused a syncopal episode.     Sulfa Drugs Itching     Cymbalta Rash     Fluconazole Rash       PAST MEDICAL HISTORY:  Past Medical History:   Diagnosis Date     Anemia      Ankle arthritis      Arthritis      Back pain      Bell's palsy 9/08    left     Breast CA (H) 2004-    left lumpectomy, radiation, -recurrence     CKD (chronic kidney disease)     stage 3 baseline Cr 1.3     Colon polyps     colonoscopy-9/12-next due in 9/13     Congestive heart failure (H)      COPD (chronic obstructive pulmonary disease) (H)      Coronary artery disease      DM (diabetes mellitus) (H)      GERD (gastroesophageal reflux disease)      Hyperlipidemia      Hypertension      Lumbar spinal stenosis     use cane     Nicotine dependence      Obesity      Osteoporosis      Other chronic pain      Pacemaker       Permanent atrial fibrillation (H) 8/2008    S/P AV node ablation and pacemaker 10-25-12       Pleural effusion      Pulmonary hypertension      PVD (peripheral vascular disease) (H)      Rectal stenosis      Tobacco abuse     current     Tricuspid regurgitation        PAST SURGICAL HISTORY:  Past Surgical History:   Procedure Laterality Date     ARTHROSCOPY SHOULDER RT/LT  1999, 2004    Bilateral, right then left     BONE MARROW BIOPSY, BONE SPECIMEN, NEEDLE/TROCAR N/A 8/29/2017    Procedure: BIOPSY BONE MARROW;  BONE MARROW BIOPSY;  Surgeon: Marino Cohen MD;  Location:  GI     C DEXA, BONE DENSITY, AXIAL SKEL       C MAMMOGRAM, SCREENING  1/2009     COLONOSCOPY N/A 10/7/2016    Procedure: COMBINED COLONOSCOPY, SINGLE OR MULTIPLE BIOPSY/POLYPECTOMY BY BIOPSY;  Surgeon: Cassandra Mccord MD;  Location:  GI     ESOPHAGOSCOPY, GASTROSCOPY, DUODENOSCOPY (EGD), COMBINED N/A 8/10/2017    Procedure: COMBINED ESOPHAGOSCOPY, GASTROSCOPY, DUODENOSCOPY (EGD), BIOPSY SINGLE OR MULTIPLE;  gastroscopy;  Surgeon: Helene Bustamante MD;  Location:  GI     H ABLATION AV NODE  10/2012     HC COLONOSCOPY THRU STOMA, DIAGNOSTIC  ? 2005     IMPLANT PACEMAKER  10/2012    [facility] [id number], [id number]     JOINT REPLACEMTN, KNEE RT/LT      Joint Replacement knee bilateral     LAPAROSCOPIC CHOLECYSTECTOMY WITH CHOLANGIOGRAMS N/A 12/14/2015    Procedure: LAPAROSCOPIC CHOLECYSTECTOMY WITH CHOLANGIOGRAMS;  Surgeon: Ervin Amos MD;  Location:  OR     LUMPECTOMY BREAST      Left-2004     PHACOEMULSIFICATION CLEAR CORNEA WITH STANDARD INTRAOCULAR LENS IMPLANT Left 7/16/2015    Procedure: PHACOEMULSIFICATION CLEAR CORNEA WITH STANDARD INTRAOCULAR LENS IMPLANT;  Surgeon: Sai Obregon MD;  Location:  EC     PHACOEMULSIFICATION CLEAR CORNEA WITH TORIC INTRAOCULAR LENS IMPLANT Right 5/9/2017    Procedure: PHACOEMULSIFICATION CLEAR CORNEA WITH TORIC INTRAOCULAR LENS IMPLANT;  RIGHT EYE PHACOEMULSIFICATION CLEAR  CORNEA WITH TORIC INTRAOCULAR LENS IMPLANT ;  Surgeon: Duc Richmond MD;  Location: [facility]       FAMILY HISTORY:  Family History   Problem Relation Age of Onset     Hypertension Mother      Diabetes Mother       at 83 yrs     C.A.D. Father       age 70s     Breast Cancer No family hx of      Colon Cancer No family hx of        SOCIAL HISTORY:  Social History     Social History     Marital status:      Spouse name: N/A     Number of children: N/A     Years of education: N/A     Social History Main Topics     Smoking status: Former Smoker     Packs/day: 0.25     Years: 45.00     Types: Cigarettes     Smokeless tobacco: Never Used     Alcohol use No     Drug use: No     Sexual activity: Not Currently     Partners: Female     Other Topics Concern     Caffeine Concern Yes     2 cups/day     Sleep Concern Yes     Stress Concern Yes     Weight Concern Yes     15+ lb weight loss since hospitalization      Special Diet Yes     bland diet r/t cholecystectomy, low salt      Exercise No     Seat Belt Yes     Social History Narrative    ,no childrenPOA- niece-Enedelia Claros-has plans to quitETOH-1/drink 2-3 /weekExercise-sit down DNR, DNI    [place]ived in [place] and [place] worked in [employer]       Review of Systems:  Skin:  Negative       Eyes:  Positive for glasses reading  ENT:  Negative      Respiratory:  Positive for shortness of breath;dyspnea on exertion     Cardiovascular:  Negative      Gastroenterology: Negative      Genitourinary:  not assessed      Musculoskeletal:  Positive for back pain;arthritis;joint pain pain in right rib area  Neurologic:  Positive for numbness or tingling of feet    Psychiatric:  Positive for sleep disturbances    Heme/Lymph/Imm:  Positive for allergies    Endocrine:  Positive for diabetes      [id number]            Thank you for allowing me to participate in the care of your patient.      Sincerely,     Jc Juarez MD     [facility]  [facility]    cc:   Ирина Wooten, ALLEN [facility]  [address]  [place] [address]

## 2018-08-06 NOTE — MR AVS SNAPSHOT
After Visit Summary   8/6/2018    Helene Gibbs    MRN: [id number]           Patient Information     Date Of Birth          1935        Visit Information        Provider Department      8/6/2018 3:15 PM Jc Juarez MD [facility]        Today's Diagnoses     Chronic diastolic congestive heart failure (H)        Permanent atrial fibrillation (H)        CKD (chronic kidney disease) stage 3, GFR 30-59 ml/min           Follow-ups after your visit        Additional Services     Follow-Up with Cardiac Advanced Practice Provider                 Your next 10 appointments already scheduled     Aug 09, 2018  1:00 PM CDT   Office Visit with Neisha Esparza MD   [facility]    [address] [place] [id number] [phone]           Bring a current list of meds and any records pertaining to this visit. For Physicals, please bring immunization records and any forms needing to be filled out. Please arrive 10 minutes early to complete paperwork.            Nov 09, 2018  1:45 PM CST   Teletrace with STONE TECH1   [facility] ([facility])    [address]  [facility] [id number] [phone] OPT 2              Future tests that were ordered for you today     Open Future Orders        Priority Expected Expires Ordered    Follow-Up with Cardiac Advanced Practice Provider Routine 2/2/2019 8/6/2019 8/6/2018            Who to contact     If you have questions or need follow up information about today's clinic visit or your schedule please contact [facility] directly at [phone].  Normal or non-critical lab and imaging results will be communicated to you by MyChart, letter or phone within 4 business days after the clinic has received the results. If you do not hear from us within 7 days, please contact the clinic  "through [employer] or phone. If you have a critical or abnormal lab result, we will notify you by phone as soon as possible.  Submit refill requests through [employer] or call your pharmacy and they will forward the refill request to us. Please allow 3 business days for your refill to be completed.          Additional Information About Your Visit        [employer] Information     [employer] gives you secure access to your electronic health record. If you see a primary care provider, you can also send messages to your care team and make appointments. If you have questions, please call your primary care clinic.  If you do not have a primary care provider, please call [phone] and they will assist you.        Care EveryWhere ID     This is your Care EveryWhere ID. This could be used by other organizations to access your [place] medical records  [id number]        Your Vitals Were     Pulse Height Last Period BMI (Body Mass Index)          66 1.676 m (5' 5.98\") (LMP Unknown) 23.82 kg/m2         Blood Pressure from Last 3 Encounters:   08/06/18 124/66   07/23/18 156/81   07/18/18 160/78    Weight from Last 3 Encounters:   08/06/18 66.9 kg (147 lb 8 oz)   07/18/18 76.7 kg (169 lb)   07/17/18 72.1 kg (159 lb)              We Performed the Following     Follow-Up with Cardiologist        Primary Care Provider Office Phone # Fax #    Neisha MINAYA MD [place] [phone] [address]  [place]                [place] [address]        Goals        Diet    Have 3 meals a day     Notes - Note created  5/23/2018 10:20 AM by Crystal Burciaga, RN    Goal Statement: I will try to eat 3 small meals a day   Measure of Success: PCP will continue to weigh the patient at her appointments and monitor for increased weight loss.   Supportive Steps to Achieve: Education on the importance of eating 3 meals day.   Barriers: Loss of appetite stated by the patient.   Strengths: Patient moving to [facility] in July with meals included.   Date " to Achieve By: August 1, 2018          General    Healthy Eating (pt-stated)     Pain Management (pt-stated)     Pain Management (pt-stated)     Notes - Note created  5/23/2018 10:17 AM by Crystal Burciaga RN    Goal Statement: I will continue to take my pain medication as prescribed. I will call my physician if the pain gets worse.   Measure of Success: Patient will stay out of the ED. Patient will call her PCP if needed. RN CC will continue to call the patient to assess how her pain is.   Supportive Steps to Achieve: RN CC will reach out monthly to assess pain. Patient will continue to keep her appointments.   Strengths: Good PCP support. Appointment scheduled with [facility].   Date to Achieve By: August 1, 2018      start date: 4/17/14 I will weigh myself daily and if any weight gain or shortness of breath I will call the clinic. (pt-stated)     Notes - Note created  4/17/2014  3:59 PM by Margareth Pichardo RN    As of today's date 4/17/2014 goal is met at 0 - 25%.   Goal Status:  Active        Equal Access to Services     [facility] AH: Hadii sukhdeep mercer hadasho Soomaali, waaxda luqadaha, qaybta kaalmada adeegyada, pamela grajeda hayjeramie mccullough . So [facility] [phone].    ATENCIÓN: Si habla español, tiene a gupta disposición servicios gratuitos de asistencia lingüística. Llame al [phone].    We comply with applicable federal civil rights laws and [place] laws. We do not discriminate on the basis of race, color, national origin, age, disability, sex, sexual orientation, or gender identity.            Thank you!     Thank you for choosing [facility]  for your care. Our goal is always to provide you with excellent care. Hearing back from our patients is one way we can continue to improve our services. Please take a few minutes to complete the written survey that you may receive in the mail after your visit with us. Thank you!             Your Updated Medication List -  Protect others around you: Learn how to safely use, store and throw away your medicines at www.disposemymeds.org.          This list is accurate as of 8/6/18  3:56 PM.  Always use your most recent med list.                   Brand Name Dispense Instructions for use Diagnosis    ACCU-CHEK SMARTVIEW test strip   Generic drug:  blood glucose monitoring     200 strip    USE 1 STRIP BY IN VITRO ROUTE 2 TIMES DAILY OR AS DIRECTED    Type 2 diabetes mellitus with diabetic nephropathy (H)       albuterol 108 (90 Base) MCG/ACT Inhaler    PROAIR HFA/PROVENTIL HFA/VENTOLIN HFA    1 Inhaler    Inhale 2 puffs into the lungs every 4 hours as needed for shortness of breath / dyspnea or wheezing    COPD (chronic obstructive pulmonary disease) (H)       apixaban ANTICOAGULANT 2.5 MG tablet    ELIQUIS    180 tablet    Take 1 tablet (2.5 mg) by mouth 2 times daily    Atrial fibrillation, unspecified type (H)       aspirin 81 MG EC tablet      Take 1 tablet (81 mg) by mouth daily    NSTEMI (non-ST elevated myocardial infarction) (H)       diazepam 10 mg Supp     30 suppository    Insert one suppository vaginally at bedtime as needed    Vaginal pain       FUROSEMIDE PO      Take 20 mg by mouth daily        GLUCOTROL PO      Take 5 mg by mouth every morning (before breakfast)        guaiFENesin 600 MG 12 hr tablet    MUCINEX    28 tablet    Take 1 tablet (600 mg) by mouth 2 times daily    Pneumonia due to infectious organism, unspecified laterality, unspecified part of lung       hydrOXYzine 25 MG capsule    VISTARIL    180 capsule    Take 1 capsule (25 mg) by mouth 2 times daily as needed for itching    Itching       lisinopril 10 MG tablet    PRINIVIL/ZESTRIL    180 tablet    Take 1 tablet (10 mg) by mouth 2 times daily    NSTEMI (non-ST elevated myocardial infarction) (H)       magnesium hydroxide 400 MG/5ML suspension    MILK OF MAGNESIA     Take 30 mLs by mouth At Bedtime        metoclopramide 5 MG tablet    REGLAN    20 tablet     Take 1 tablet (5 mg) by mouth 3 times daily as needed        metoprolol tartrate 25 MG tablet    LOPRESSOR    180 tablet    Take 1 tablet (25 mg) by mouth 2 times daily    NSTEMI (non-ST elevated myocardial infarction) (H)       ondansetron 4 MG ODT tab    ZOFRAN ODT    30 tablet    Take 1 tablet (4 mg) by mouth every 6 hours as needed for nausea    Nausea       ranitidine 150 MG tablet    ZANTAC    180 tablet    TAKE ONE TABLET BY MOUTH TWO TIMES A DAY    Dyspepsia       simvastatin 10 MG tablet    ZOCOR    90 tablet    TAKE 1 TABLET (10 MG) BY MOUTH AT BEDTIME    Type 2 diabetes mellitus with diabetic nephropathy, without long-term current use of insulin (H)       sucralfate 1 GM/10ML suspension    CARAFATE    420 mL    Take 10 mLs (1 g) by mouth 4 times daily        tiotropium 18 MCG capsule    SPIRIVA    30 capsule    Inhale 1 capsule (18 mcg) into the lungs every evening    Chronic obstructive pulmonary disease, unspecified COPD type (H)       traMADol 50 MG tablet    ULTRAM    60 tablet    Take 1 tablet (50 mg) by mouth every 6 hours as needed for severe pain    Intractable back pain

## 2018-08-06 NOTE — PROGRESS NOTES
[facility] Accent (D) Pacemaker Device Check  AP: NA % : >99 %  Mode: VVIR 70        Underlying Rhythm: AF with CHB achieved by AVNA, no intrinsic V rhythm at VVI 30  Heart Rate: minimal variability, stable. Pt was recently diagnosed with pneumonia, so she has felt SOB since then, but prior to the pneumonia she has not had activity intolerance  Sensing: WNL    Pacing Threshold: WNL   Impedance: WNL  Battery Status: 8.3-9.2 yrs estimated longevity  Device Site: [facility]  Atrial Arrhythmia: chronic AF, on Eliquis  Ventricular Arrhythmia: none  Setting Change: none    Care Plan: office teletrace in 3 months, scheduled. OV with Dr. Juarez today.    YESICA ATKINS

## 2018-08-07 NOTE — PROGRESS NOTES
"Service Date: 08/06/2018      HISTORY OF PRESENT ILLNESS:    It was my pleasure to see Ms. Helene Gibbs, a pleasant 83-year-old woman, for the following issues:   1.  Permanent atrial fibrillation with AV node ablation and pacemaker implantation in 2012.  On apixaban for anticoagulation.   2.  Chronic diastolic heart failure (HFpEF).  She had hospitalization with CHF in 05/2018.   3.  What was labeled a \"non-STEMI\" in 05/2008 based on a troponin elevation of 0.7.  The patient had a nuclear stress test that showed mild apical ischemia.  Treated medically.   4.  Recent hospitalization with pneumonia.   5.  Hypertension.   6.  Tricuspid regurgitation and mild pulmonary hypertension.      Ms. Gibbs was recently hospitalized with pneumonia.  She told me it took 3 separate ER visits in the month of July before she was diagnosed with pneumonia.  She is wiped out and is still recovering from this.  She is disappointed that the pneumonia was not diagnosed earlier and she had to suffer so much.  She was also hospitalized in 05/2018 with congestive heart failure.  She was disappointed because she was not told at the time that she had a \"heart attack.\"  However, later she was told that she had a \"heart attack.\"  She was unhappy with this discrepancy.  I note that the \"heart attack\" or non-STEMI label was applied for a troponin elevation of 0.7.      Ms. Gibbs has lost weight and tells me that her appetite has not returned to normal after her recent hospitalization.  She is watching her salt intake and her weight is close to her dry weight, which is about 150 pounds.  She feels best when her weight is around 150 pounds.  She has had no chest pain, syncope, near-syncope.  Device interrogations have been performed on a regular basis in our clinic and her device functions well.  Most recent check was actually earlier today.  Estimated battery longevity is 9 years.      PHYSICAL EXAMINATION:   VITAL SIGNS:  Blood pressure " 124/66, pulse 66 and regular, weight 67 kg.  Height 167 cm.   GENERAL:  She is a pleasant, elderly woman with moderate kyphosis.  There is no apparent distress.   HEENT:  Normocephalic.   NECK:  Supple with no obvious jugular venous distention.   LUNGS:  With mildly decreased breath sounds.   CARDIOVASCULAR:  Regular paced rhythm, 1/6 systolic murmur at the xiphoid and toward the apex.  No rub.   ABDOMEN:  Soft, nontender.   EXTREMITIES:  Prominent venous varicosities, trace edema.      IMPRESSION:   1.  Chronic diastolic heart failure with exacerbation and hospitalization in 2018.  Ms. Gibbs appeared euvolemic today.  She is on her chronic maintenance dose of furosemide of 20 mg daily and she typically does well with this.  Her blood pressure is under good control as well.  I did not make changes.  I emphasized the importance of letting us know if her weight starts creeping up, especially if it passes the 153-154 pound threshold.  She should then call us to adjust her diuretics and hopefully prevent hospitalization.   2.  Permanent atrial fibrillation.  She has had previous AV node ablation.  Her device functions well.  She is tolerating Eliquis, current dose 2.5 mg b.i.d.  This is a reasonable, though borderline since her weight is above 60 kg.  I did not make changes.      RECOMMENDATIONS:   1.  Continue current cardiac medications.     2.  I requested a follow-up visit with Angelica in 6 months.  In the future, it would be best for her to be seen by General Cardiology.       Thank you for the opportunity to be a part of her care.   Time spent was 30 minutes.  More than 50% of time was discussion and counseling.         CARSON VILLALPANDO MD, [facility]         cc:      Neisha Esparza MD    [facility]   [address]   [place] [address]             D: 2018   T: 2018   MT: BLANQUITA      Name:     PATRICIA GIBBS   MRN:      [id number]        Account:      [id number]   :      1935            Service Date: 08/06/2018      Document: [id number]

## 2018-08-09 NOTE — PROGRESS NOTES
SUBJECTIVE:   Helene Gibbs is a 83 year old female who presents to clinic today for the following health issues:    Hospital Follow-up Visit:    Hospital/Nursing Home/IP Rehab Facility: [facility]  Date of Admission: 7/21/18  Date of Discharge: 7/23/18  Reason(s) for Admission: pneumonia             Problems taking medications regularly:  None       Medication changes since discharge: None       Problems adhering to non-medication therapy:  Patient state that she was given some medication but she did not take it    Summary of hospitalization:  [facility] discharge summary reviewed  Diagnostic Tests/Treatments reviewed.  Follow up needed: PCP  Other Healthcare Providers Involved in Patient s Care:         None  Update since discharge: improved.   Post Discharge Medication Reconciliation: discharge medications reconciled and changed, per note/orders (see AVS).  Plan of care communicated with patient     Coding guidelines for this visit:  Type of Medical   Decision Making Face-to-Face Visit       within 7 Days of discharge Face-to-Face Visit        within 14 days of discharge   Moderate Complexity [id number]   High Complexity [id number]          Reports that she's still fatigued  Patient reports that she had three medical visits before being diagnosed with pneumonia  Reports that she had severe chills  Last ED visit she was diagnosed with pneumonia  Admitted to hospital and treated  She was discharged to home  She has lost a lot of weight since illness    Problem list and histories reviewed & adjusted, as indicated.  Additional history: as documented    Medications and Labs reviewed in EPIC    Reviewed and updated as needed this visit by clinical staff  Tobacco  Allergies  Meds  Soc Hx      Reviewed and updated as needed this visit by Provider       ROS:  Constitutional, HEENT, cardiovascular, pulmonary, GI, , musculoskeletal, neuro, skin, endocrine and psych systems are negative,  except as otherwise noted.    This document serves as a record of the services and decisions personally performed and made by Neisha Esparza MD. It was created on her behalf by Cassandra Holcomb, a trained medical scribe. The creation of this document is based on the provider's statements to the medical scribe.  Cassandra Holcomb 1:07 PM August 9, 2018    OBJECTIVE:     /77 (BP Location: Right arm, Patient Position: Sitting, Cuff Size: Adult Regular)  Pulse 70  Temp 97.4  F (36.3  C) (Oral)  Resp 16  Wt 66.7 kg (147 lb)  LMP  (LMP Unknown)  SpO2 98%  Breastfeeding? No  BMI 23.74 kg/m2  Body mass index is 23.74 kg/(m^2).    GENERAL APPEARANCE: uses assisted walker, alert and no distress  EYES: Eyes grossly normal to inspection, PERRL and conjunctivae and sclerae normal  HENT: ear canals and TM's normal and nose and mouth without ulcers or lesions  NECK: no adenopathy  RESP: lungs clear to auscultation - no rales, rhonchi or wheezes  CV: regular rates and rhythm, normal S1 S2, no S3  PSYCH: mentation appears normal, affect normal/bright    Diagnostic Test Results:  No results found. However, due to the size of the patient record, not all encounters were searched. Please check Results Review for a complete set of results.    ASSESSMENT/PLAN:     Helene was seen today for hospital f/u.    Diagnoses and all orders for this visit:    Pneumonia of left lower lobe due to infectious organism (H)  Patient was admitted to [facility] on 07/21/18 with pneumonia  She was treated and discharged on 07/23/18  Patient was unhappy with her care at the hospital  She was in the ED two times prior to admission  Explained to her that pneumonia may take a while to show up on XR  Reports she's better  However, still feeling fatigued  Advised her to stay well-hydrated  Advised her to take some rest  Explained that it will take a while for her to get better  She has lost a lot of weight  Advised healthy eating  habits    Other chronic pain  -     HYDROcodone-acetaminophen (NORCO) 5-325 MG per tablet; Take 1 tablet by mouth every 6 hours as needed for pain  Educated patient that norco is a controlled substance. It can be habit-forming. It should be taken as prescribed. Do not mix it with alcohol. Be careful with driving. Do not lose the prescription. Do not overuse this medication.  Advised to not take it with tramadol  Advised holding it if she feels oversedated    Neuropathy  -     pregabalin (LYRICA) 75 MG capsule; Take 1 capsule (75 mg) by mouth 2 times daily  Lyrica works to help with her pain  It was started by pain clinic.    Chronic midline low back pain, with sciatica presence unspecified  -     lidocaine (LIDODERM) 5 % Patch; Apply up to 3 patches to painful area at once for up to 12 h within a 24 h period.  Remove after 12 hours.  Advised use of lidocaine patches as topical analgesic for her pain    Anemia, unspecified type  -     CBC with platelets  -     Ferritin    Patient Instructions   Labs today  I refilled your prescriptions  Norco is a controlled substance. It can be habit-forming. It should be taken as prescribed. Do not mix it with alcohol. Be careful with driving. Do not lose the prescription. Do not overuse this medication.  Don't mix tramadol and norco together  Hold if you are over sedated  Follow up in 2 months  Chest X-ray in 2 months  Seek sooner medical attention if there is any worsening of symptoms or problems    The information in this document, created by the medical scribe for me, accurately reflects the services I personally performed and the decisions made by me. I have reviewed and approved this document for accuracy prior to leaving the patient care area.  August 9, 2018 1:28 PM    Neisha Esparza MD  [facility]

## 2018-08-09 NOTE — MR AVS SNAPSHOT
After Visit Summary   8/9/2018    Helene Gibbs    MRN: [id number]           Patient Information     Date Of Birth          1935        Visit Information        Provider Department      8/9/2018 1:00 PM Neisha Esparza MD [facility]        Today's Diagnoses     Pneumonia of left lower lobe due to infectious organism (H)    -  1    Other chronic pain        Neuropathy        Chronic midline low back pain, with sciatica presence unspecified        Anemia, unspecified type          Care Instructions    Labs today  I refilled your prescriptions  Norco is a controlled substance. It can be habit-forming. It should be taken as prescribed. Do not mix it with alcohol. Be careful with driving. Do not lose the prescription. Do not overuse this medication.  Don't mix tramadol and norco together  Hold if you are over sedated  Follow up in 2 months  Chest X-ray in 2 months  Seek sooner medical attention if there is any worsening of symptoms or problems          Follow-ups after your visit        Your next 10 appointments already scheduled     Nov 09, 2018  1:45 PM CST   [place] with STONE TECH1   [facility] ([facility])    [address] [place] [address] [id number] [phone] OPT 2              Who to contact     If you have questions or need follow up information about today's clinic visit or your schedule please contact [facility] directly at [phone].  Normal or non-critical lab and imaging results will be communicated to you by MyChart, letter or phone within 4 business days after the clinic has received the results. If you do not hear from us within 7 days, please contact the clinic through MyChart or phone. If you have a critical or abnormal lab result, we will notify you by phone as soon as possible.  Submit refill requests through [employer] or call your pharmacy and they will forward the refill request to us.  Please allow 3 business days for your refill to be completed.          Additional Information About Your Visit        [employer] Information     [employer] gives you secure access to your electronic health record. If you see a primary care provider, you can also send messages to your care team and make appointments. If you have questions, please call your primary care clinic.  If you do not have a primary care provider, please call [phone] and they will assist you.        Care EveryWhere ID     This is your Care EveryWhere ID. This could be used by other organizations to access your [place] medical records  [id number]        Your Vitals Were     Pulse Temperature Respirations Last Period Pulse Oximetry Breastfeeding?    70 97.4  F (36.3  C) (Oral) 16 (LMP Unknown) 98% No    BMI (Body Mass Index)                   23.74 kg/m2            Blood Pressure from Last 3 Encounters:   08/09/18 127/77   08/06/18 124/66   07/23/18 156/81    Weight from Last 3 Encounters:   08/09/18 147 lb (66.7 kg)   08/06/18 147 lb 8 oz (66.9 kg)   07/18/18 169 lb (76.7 kg)              We Performed the Following     CBC with platelets     Ferritin          Today's Medication Changes          These changes are accurate as of 8/9/18  1:26 PM.  If you have any questions, ask your nurse or doctor.               Start taking these medicines.        Dose/Directions    HYDROcodone-acetaminophen 5-325 MG per tablet   Commonly known as:  NORCO   Used for:  Other chronic pain   Started by:  Neisha Esparza MD        Dose:  1 tablet   Take 1 tablet by mouth every 6 hours as needed for pain   Quantity:  90 tablet   Refills:  0       lidocaine 5 % Patch   Commonly known as:  LIDODERM   Used for:  Chronic midline low back pain, with sciatica presence unspecified   Started by:  Neisha Esparza MD        Apply up to 3 patches to painful area at once for up to 12 h within a 24 h period.  Remove after 12 hours.   Quantity:  60 patch   Refills:  1        pregabalin 75 MG capsule   Commonly known as:  LYRICA   Used for:  Neuropathy   Started by:  Neisha Esparza MD        Dose:  75 mg   Take 1 capsule (75 mg) by mouth 2 times daily   Quantity:  60 capsule   Refills:  3            Where to get your medicines      These medications were sent to [facility], [place] - [address]  [address], [place] [address]     Phone:  [phone]     lidocaine 5 % Patch         Some of these will need a paper prescription and others can be bought over the counter.  Ask your nurse if you have questions.     Bring a paper prescription for each of these medications     HYDROcodone-acetaminophen 5-325 MG per tablet    pregabalin 75 MG capsule               Information about OPIOIDS     PRESCRIPTION OPIOIDS: WHAT YOU NEED TO KNOW   We gave you an opioid (narcotic) pain medicine. It is important to manage your pain, but opioids are not always the best choice. You should first try all the other options your care team gave you. Take this medicine for as short a time (and as few doses) as possible.    Some activities can increase your pain, such as bandage changes or therapy sessions. It may help to take your pain medicine 30 to 60 minutes before these activities. Reduce your stress by getting enough sleep, working on hobbies you enjoy and practicing relaxation or meditation. Talk to your care team about ways to manage your pain beyond prescription opioids.    These medicines have risks:    DO NOT drive when on new or higher doses of pain medicine. These medicines can affect your alertness and reaction times, and you could be arrested for driving under the influence (DUI). If you need to use opioids long-term, talk to your care team about driving.    DO NOT operate heavy machinery    DO NOT do any other dangerous activities while taking these medicines.    DO NOT drink any alcohol while taking these medicines.     If the opioid  prescribed includes acetaminophen, DO NOT take with any other medicines that contain acetaminophen. Read all labels carefully. Look for the word  acetaminophen  or  Tylenol.  Ask your pharmacist if you have questions or are unsure.    You can get addicted to pain medicines, especially if you have a history of addiction (chemical, alcohol or substance dependence). Talk to your care team about ways to reduce this risk.    All opioids tend to cause constipation. Drink plenty of water and eat foods that have a lot of fiber, such as fruits, vegetables, prune juice, apple juice and high-fiber cereal. Take a laxative (Miralax, milk of magnesia, Colace, Senna) if you don t move your bowels at least every other day. Other side effects include upset stomach, sleepiness, dizziness, throwing up, tolerance (needing more of the medicine to have the same effect), physical dependence and slowed breathing.    Store your pills in a secure place, locked if possible. We will not replace any lost or stolen medicine. If you don t finish your medicine, please throw away (dispose) as directed by your pharmacist. The [place] Pollution [employer] has more information about safe disposal: [email or web address].[email or web address]/[email or web address]-unwanted-medications         Primary Care Provider Office Phone # Fax #    Neisha Esparza [phone] [address] [facility] [address]  [facility] [address]        Goals        Diet    Have 3 meals a day     Notes - Note created  5/23/2018 10:20 AM by Crystal Burciaga, RN    Goal Statement: I will try to eat 3 small meals a day   Measure of Success: PCP will continue to weigh the patient at her appointments and monitor for increased weight loss.   Supportive Steps to Achieve: Education on the importance of eating 3 meals day.   Barriers: Loss of appetite stated by the patient.   Strengths: Patient moving to [facility] in July with meals included.   Date to Achieve By: August 1, 2018           General    Healthy Eating (pt-stated)     Pain Management (pt-stated)     Pain Management (pt-stated)     Notes - Note created  5/23/2018 10:17 AM by Crystal Burciaga, WILBERT    Goal Statement: I will continue to take my pain medication as prescribed. I will call my physician if the pain gets worse.   Measure of Success: Patient will stay out of the ED. Patient will call her PCP if needed. RN CC will continue to call the patient to assess how her pain is.   Supportive Steps to Achieve: RN CC will reach out monthly to assess pain. Patient will continue to keep her appointments.   Strengths: Good PCP support. Appointment scheduled with [facility].   Date to Achieve By: August 1, 2018      start date: 4/17/14 I will weigh myself daily and if any weight gain or shortness of breath I will call the clinic. (pt-stated)     Notes - Note created  4/17/2014  3:59 PM by Margareth Pichardo, RN    As of today's date 4/17/2014 goal is met at 0 - 25%.   Goal Status:  Active        Equal Access to Services     [facility] AH: Hadii sukhdeep ku hadasho Soomaali, waaxda luqadaha, qaybta kaalmada adeegyada, waxay nicci hayjeramie mccullough . So [facility] [phone].    ATENCIÓN: Si habla español, tiene a gupta disposición servicios gratuitos de asistencia lingüística. Llame al [phone].    We comply with applicable federal civil rights laws and [place] laws. We do not discriminate on the basis of race, color, national origin, age, disability, sex, sexual orientation, or gender identity.            Thank you!     Thank you for choosing [facility]  for your care. Our goal is always to provide you with excellent care. Hearing back from our patients is one way we can continue to improve our services. Please take a few minutes to complete the written survey that you may receive in the mail after your visit with us. Thank you!             Your Updated Medication List - Protect others around you: Learn how to safely use,  store and throw away your medicines at www.disposemymeds.org.          This list is accurate as of 8/9/18  1:26 PM.  Always use your most recent med list.                   Brand Name Dispense Instructions for use Diagnosis    ACCU-CHEK SMARTVIEW test strip   Generic drug:  blood glucose monitoring     200 strip    USE 1 STRIP BY IN VITRO ROUTE 2 TIMES DAILY OR AS DIRECTED    Type 2 diabetes mellitus with diabetic nephropathy (H)       albuterol 108 (90 Base) MCG/ACT Inhaler    PROAIR HFA/PROVENTIL HFA/VENTOLIN HFA    1 Inhaler    Inhale 2 puffs into the lungs every 4 hours as needed for shortness of breath / dyspnea or wheezing    COPD (chronic obstructive pulmonary disease) (H)       apixaban ANTICOAGULANT 2.5 MG tablet    ELIQUIS    180 tablet    Take 1 tablet (2.5 mg) by mouth 2 times daily    Atrial fibrillation, unspecified type (H)       aspirin 81 MG EC tablet      Take 1 tablet (81 mg) by mouth daily    NSTEMI (non-ST elevated myocardial infarction) (H)       diazepam 10 mg Supp     30 suppository    Insert one suppository vaginally at bedtime as needed    Vaginal pain       FUROSEMIDE PO      Take 20 mg by mouth daily        GLUCOTROL PO      Take 5 mg by mouth every morning (before breakfast)        guaiFENesin 600 MG 12 hr tablet    MUCINEX    28 tablet    Take 1 tablet (600 mg) by mouth 2 times daily    Pneumonia due to infectious organism, unspecified laterality, unspecified part of lung       HYDROcodone-acetaminophen 5-325 MG per tablet    NORCO    90 tablet    Take 1 tablet by mouth every 6 hours as needed for pain    Other chronic pain       hydrOXYzine 25 MG capsule    VISTARIL    180 capsule    Take 1 capsule (25 mg) by mouth 2 times daily as needed for itching    Itching       lidocaine 5 % Patch    LIDODERM    60 patch    Apply up to 3 patches to painful area at once for up to 12 h within a 24 h period.  Remove after 12 hours.    Chronic midline low back pain, with sciatica presence unspecified        lisinopril 10 MG tablet    PRINIVIL/ZESTRIL    180 tablet    Take 1 tablet (10 mg) by mouth 2 times daily    NSTEMI (non-ST elevated myocardial infarction) (H)       magnesium hydroxide 400 MG/5ML suspension    MILK OF MAGNESIA     Take 30 mLs by mouth At Bedtime        metoclopramide 5 MG tablet    REGLAN    20 tablet    Take 1 tablet (5 mg) by mouth 3 times daily as needed        metoprolol tartrate 25 MG tablet    LOPRESSOR    180 tablet    Take 1 tablet (25 mg) by mouth 2 times daily    NSTEMI (non-ST elevated myocardial infarction) (H)       ondansetron 4 MG ODT tab    ZOFRAN ODT    30 tablet    Take 1 tablet (4 mg) by mouth every 6 hours as needed for nausea    Nausea       pregabalin 75 MG capsule    LYRICA    60 capsule    Take 1 capsule (75 mg) by mouth 2 times daily    Neuropathy       ranitidine 150 MG tablet    ZANTAC    180 tablet    TAKE ONE TABLET BY MOUTH TWO TIMES A DAY    Dyspepsia       simvastatin 10 MG tablet    ZOCOR    90 tablet    TAKE 1 TABLET (10 MG) BY MOUTH AT BEDTIME    Type 2 diabetes mellitus with diabetic nephropathy, without long-term current use of insulin (H)       sucralfate 1 GM/10ML suspension    CARAFATE    420 mL    Take 10 mLs (1 g) by mouth 4 times daily        tiotropium 18 MCG capsule    SPIRIVA    30 capsule    Inhale 1 capsule (18 mcg) into the lungs every evening    Chronic obstructive pulmonary disease, unspecified COPD type (H)       traMADol 50 MG tablet    ULTRAM    60 tablet    Take 1 tablet (50 mg) by mouth every 6 hours as needed for severe pain    Intractable back pain

## 2018-08-09 NOTE — PATIENT INSTRUCTIONS
Labs today  I refilled your prescriptions  Norco is a controlled substance. It can be habit-forming. It should be taken as prescribed. Do not mix it with alcohol. Be careful with driving. Do not lose the prescription. Do not overuse this medication.  Don't mix tramadol and norco together  Hold if you are over sedated  Follow up in 2 months  Chest X-ray in 2 months  Seek sooner medical attention if there is any worsening of symptoms or problems

## 2018-08-10 NOTE — PROGRESS NOTES
Shakira Narayan,    This is to inform you regarding your test result.    CBC result which includes Hemoglobin and  Platelet Counts is satisfactory.  Ferritin which is iron stores in the body is normal.      Sincerely,      Dr.Nasima Vilma MD,FACP

## 2018-08-13 NOTE — TELEPHONE ENCOUNTER
Reason for Call:  Request for results:    Name of test or procedure: Need lab results     Date of test of procedure: 8/9/18    Location of the test or procedure: clinic    OK to leave the result message on voice mail or with a family member? YES    Phone number Patient can be reached at:  Home number on file [phone]  Additional comments: Pt calling to get results on lab work completed on 8/9.  Please call pt w/results.     Call taken on 8/13/2018 at 1:38 PM by Tressa Chavarria

## 2018-08-13 NOTE — TELEPHONE ENCOUNTER
Called patient and let her know we did mail the results, offered to give her the results via phone, she said no she would wait for the letter.   Lian Jones MA

## 2018-08-24 NOTE — TELEPHONE ENCOUNTER
Reason for Call: Request for an order/prescription    Order or referral being requested: new diabetic meter-    Date needed: as soon as possible    Has the patient been seen by the PCP for this problem? NO    Additional comments: patient is requesting an order/prescription for the new Freestyle Jennifer glucose monitoring system.  She saw this on TV and believes it would really be helpful for her.  Not sure if covered by insurance, but is willing to pay for it if not.    [facility] does carry this.       [id number] - [place], [place] - [address]    Phone number Patient can be reached at:  Home number on file [phone] (home)    Best Time:  anytime    Can we leave a detailed message on this number?  YES    Call taken on 8/24/2018 at 8:01 AM by Alysa Carrizales.

## 2018-08-24 NOTE — TELEPHONE ENCOUNTER
Try to call her and got the voicemail.  I will try her again later in the day.  Dr.Nasima Vilma MD

## 2018-08-27 NOTE — TELEPHONE ENCOUNTER
Spoke to patient   She has diabetes which is well controlled .  So she does not need to use free style radha.  She agreed  Dr.Nasima Vilma MD

## 2018-09-13 PROBLEM — G89.29 OTHER CHRONIC PAIN: Status: ACTIVE | Noted: 2017-11-01

## 2018-09-13 NOTE — TELEPHONE ENCOUNTER
Patient is followed by Neisha Esparza MD for ongoing prescription of pain medication.  All refills should be approved by this provider, or covering partner.    Medication(s): tramadol.   Maximum quantity per month: # 60  Clinic visit frequency required: Q 3 months     Controlled substance agreement:  Encounter-Level CSA - 8/28/14:               Controlled Substance Agreement - Scan on 8/29/2014  8:53 AM : CONTROLLED MEDICATION AGREEMENT  8/28/14 (below)            Pain Clinic evaluation in the past: No    DIRE Total Score(s):  No flowsheet data found.    Last [facility] website verification:  09/13/2018 [place]      [email or web address]

## 2018-09-13 NOTE — TELEPHONE ENCOUNTER
traMADol (ULTRAM) 50 MG tablet  Last Written Prescription Date:  6/29/18  Last Fill Quantity: 60 tablet,  # refills: 1   Last office visit: 8/9/2018 with prescribing provider:  Vilma   Future Office Visit:   Next 5 appointments (look out 90 days)     Sep 24, 2018 11:45 AM CDT   Return Visit with Pieter Watson MD   [facility] ([facility]    [address] [place] [facility] [address] [place] [id number]   [phone]            Oct 18, 2018  1:30 PM CDT   [facility]    [address] [facility] [address]   [phone]

## 2018-09-24 NOTE — LETTER
[facility] [address] [place] [place] [address]  [place] [address]  Phone: [phone]  Fax: [phone]      2018    RE: Helene Gibbs,  : 1935      Mrs. Gupta is doing well. She has greatly benefited from the lymphedema pump. Her toe pain from ischemic rest pain has subsided. She has biphasic signal in the DP on the left.   I have reviewed the non invasive studies and discussed those with her in detail.   She can follow up PRN    Sincerely,    Pieter Diallo MD    [facility]  [address]  [place] [address]  Phone: [phone]  Fax: [phone]

## 2018-09-24 NOTE — MR AVS SNAPSHOT
After Visit Summary   9/24/2018    Helene Gibbs    MRN: [id number]           Patient Information     Date Of Birth          1935        Visit Information        Provider Department      9/24/2018 11:45 AM Pieter Watson MD [facility]      Today's Diagnoses     Critical ischemia of lower extremity    -  1       Follow-ups after your visit        Your next 10 appointments already scheduled     Oct 18, 2018  1:30 PM CDT   [facility]    [address] [facility] [id number] [phone]           Bring a current list of meds and any records pertaining to this visit. For Physicals, please bring immunization records and any forms needing to be filled out. Please arrive 10 minutes early to complete paperwork.            Nov 09, 2018  1:45 PM CST   [place] with STONE TECH1   [facility] ([facility])    [address]  [facility] [id number] [phone] OPT 2            Nov 19, 2018  1:30 PM CST   [facility]    [address]  [facility] [id number] [phone]              Who to contact     If you have questions or need follow up information about today's clinic visit or your schedule please contact [facility] directly at [phone].  Normal or non-critical lab and imaging results will be communicated to you by MyChart, letter or phone within 4 business days after the clinic has received the results. If you do not hear from us within 7 days, please contact the clinic through MyChart or phone. If you have a critical or abnormal lab result, we will notify you by phone as soon as possible.  Submit refill requests through [employer] or call your pharmacy and they will  forward the refill request to us. Please allow 3 business days for your refill to be completed.          Additional Information About Your Visit        [email or web address] Information     [employer] gives you secure access to your electronic health record. If you see a primary care provider, you can also send messages to your care team and make appointments. If you have questions, please call your primary care clinic.  If you do not have a primary care provider, please call [phone] and they will assist you.        Care EveryWhere ID     This is your Care EveryWhere ID. This could be used by other organizations to access your [place] medical records  [id number]        Your Vitals Were     Pulse Last Period Breastfeeding?             70 (LMP Unknown) No          Blood Pressure from Last 3 Encounters:   09/24/18 116/72   08/09/18 127/77   08/06/18 124/66    Weight from Last 3 Encounters:   08/09/18 147 lb (66.7 kg)   08/06/18 147 lb 8 oz (66.9 kg)   07/18/18 169 lb (76.7 kg)              Today, you had the following     No orders found for display       Primary Care Provider Office Phone # Fax #    Neisha MINAYA MD [place] [phone] [address]  [place]                [place] [address]        Goals        Diet    Have 3 meals a day     Notes - Note created  5/23/2018 10:20 AM by Crystal Burciaga RN    Goal Statement: I will try to eat 3 small meals a day   Measure of Success: PCP will continue to weigh the patient at her appointments and monitor for increased weight loss.   Supportive Steps to Achieve: Education on the importance of eating 3 meals day.   Barriers: Loss of appetite stated by the patient.   Strengths: Patient moving to [facility] in July with meals included.   Date to Achieve By: August 1, 2018          General    Healthy Eating (pt-stated)     Pain Management (pt-stated)     Pain Management (pt-stated)     Notes - Note created  5/23/2018 10:17 AM by Crystal Burciaga RN    Goal Statement: I  will continue to take my pain medication as prescribed. I will call my physician if the pain gets worse.   Measure of Success: Patient will stay out of the ED. Patient will call her PCP if needed. RN CC will continue to call the patient to assess how her pain is.   Supportive Steps to Achieve: RN CC will reach out monthly to assess pain. Patient will continue to keep her appointments.   Strengths: Good PCP support. Appointment scheduled with [facility].   Date to Achieve By: August 1, 2018      start date: 4/17/14 I will weigh myself daily and if any weight gain or shortness of breath I will call the clinic. (pt-stated)     Notes - Note created  4/17/2014  3:59 PM by Margareth Pichardo RN    As of today's date 4/17/2014 goal is met at 0 - 25%.   Goal Status:  Active        Equal Access to Services     TONG TUCKER : Hadfaraz Brady, waaxda luqadaha, qaybta kaalmamaile castañeda, pamela mccullough . So [facility] [phone].    ATENCIÓN: Si habla español, tiene a gupta disposición servicios gratuitos de asistencia lingüística. Llame al [phone].    We comply with applicable federal civil rights laws and [place] laws. We do not discriminate on the basis of race, color, national origin, age, disability, sex, sexual orientation, or gender identity.            Thank you!     Thank you for choosing [facility] [place]  for your care. Our goal is always to provide you with excellent care. Hearing back from our patients is one way we can continue to improve our services. Please take a few minutes to complete the written survey that you may receive in the mail after your visit with us. Thank you!             Your Updated Medication List - Protect others around you: Learn how to safely use, store and throw away your medicines at www.disposemymeds.org.          This list is accurate as of 9/24/18 12:26 PM.  Always use your most recent med list.                   Brand Name Dispense  Instructions for use Diagnosis    ACCU-CHEK SMARTVIEW test strip   Generic drug:  blood glucose monitoring     200 strip    USE 1 STRIP BY IN VITRO ROUTE 2 TIMES DAILY OR AS DIRECTED    Type 2 diabetes mellitus with diabetic nephropathy (H)       albuterol 108 (90 Base) MCG/ACT inhaler    PROAIR HFA/PROVENTIL HFA/VENTOLIN HFA    1 Inhaler    Inhale 2 puffs into the lungs every 4 hours as needed for shortness of breath / dyspnea or wheezing    COPD (chronic obstructive pulmonary disease) (H)       apixaban ANTICOAGULANT 2.5 MG tablet    ELIQUIS    180 tablet    Take 1 tablet (2.5 mg) by mouth 2 times daily    Atrial fibrillation, unspecified type (H)       aspirin 81 MG EC tablet      Take 1 tablet (81 mg) by mouth daily    NSTEMI (non-ST elevated myocardial infarction) (H)       diazepam 10 mg Supp     30 suppository    Insert one suppository vaginally at bedtime as needed    Vaginal pain       FUROSEMIDE PO      Take 20 mg by mouth daily        GLUCOTROL PO      Take 5 mg by mouth every morning (before breakfast)        guaiFENesin 600 MG 12 hr tablet    MUCINEX    28 tablet    Take 1 tablet (600 mg) by mouth 2 times daily    Pneumonia due to infectious organism, unspecified laterality, unspecified part of lung       HYDROcodone-acetaminophen 5-325 MG per tablet    NORCO    90 tablet    Take 1 tablet by mouth every 6 hours as needed for pain    Other chronic pain       hydrOXYzine 25 MG capsule    VISTARIL    180 capsule    Take 1 capsule (25 mg) by mouth 2 times daily as needed for itching    Itching       lidocaine 5 % Patch    LIDODERM    60 patch    Apply up to 3 patches to painful area at once for up to 12 h within a 24 h period.  Remove after 12 hours.    Chronic midline low back pain, with sciatica presence unspecified       lisinopril 10 MG tablet    PRINIVIL/ZESTRIL    180 tablet    Take 1 tablet (10 mg) by mouth 2 times daily    NSTEMI (non-ST elevated myocardial infarction) (H)       magnesium hydroxide  400 MG/5ML suspension    MILK OF MAGNESIA     Take 30 mLs by mouth At Bedtime        metoclopramide 5 MG tablet    REGLAN    20 tablet    Take 1 tablet (5 mg) by mouth 3 times daily as needed        metoprolol tartrate 25 MG tablet    LOPRESSOR    180 tablet    Take 1 tablet (25 mg) by mouth 2 times daily    NSTEMI (non-ST elevated myocardial infarction) (H)       ondansetron 4 MG ODT tab    ZOFRAN ODT    30 tablet    Take 1 tablet (4 mg) by mouth every 6 hours as needed for nausea    Nausea       pregabalin 75 MG capsule    LYRICA    60 capsule    Take 1 capsule (75 mg) by mouth 2 times daily    Neuropathy       ranitidine 150 MG tablet    ZANTAC    180 tablet    TAKE ONE TABLET BY MOUTH TWO TIMES A DAY    Dyspepsia       simvastatin 10 MG tablet    ZOCOR    90 tablet    TAKE 1 TABLET (10 MG) BY MOUTH AT BEDTIME    Type 2 diabetes mellitus with diabetic nephropathy, without long-term current use of insulin (H)       tiotropium 18 MCG capsule    SPIRIVA    30 capsule    Inhale 1 capsule (18 mcg) into the lungs every evening    Chronic obstructive pulmonary disease, unspecified COPD type (H)       traMADol 50 MG tablet    ULTRAM    60 tablet    Take 1 tablet (50 mg) by mouth every 6 hours as needed for severe pain Do not take hydrocodone with this    Intractable back pain

## 2018-09-24 NOTE — NURSING NOTE
"Helene Gibbs is a 83 year old female who presents for:  Chief Complaint   Patient presents with     RECHECK     EMIGDIO ( 11:15[facility], 11:45am [facility]) History of angioplasty of left anterior tibial artery.        Vitals:    Vitals:    09/24/18 1200   BP: 116/72   BP Location: Right arm   Patient Position: Chair   Cuff Size: Adult Large   Pulse: 70       BMI:  Estimated body mass index is 23.74 kg/(m^2) as calculated from the following:    Height as of 8/6/18: 5' 5.98\" (1.676 m).    Weight as of 8/9/18: 147 lb (66.7 kg).    Pain Score:  Data Unavailable        Jessica Fish MA      "

## 2018-10-16 NOTE — TELEPHONE ENCOUNTER
Pt is to have a tooth pulled on 10/23 and was told by her dentist to contact clinic with how long she should hold her Eliquis.  Pt has no history of stroke or valve replacement, thus may hold the Eliquis for 2-3 days and then restart as soon after procedure as is safe from a bleeding standpoint. Pt states understanding.  JNelsonRN

## 2018-10-17 NOTE — TELEPHONE ENCOUNTER
"Pt called in today to cancel her appt for tomorrow for f/u.  She is rescheduled for 11/01/2018 at 1330.  Pt sounds depressed.  Said she has to get root canal and is having a some type of eye appt.  She was talking slowly and making comments \"can you believe all this is happening to me?\"  Told to keep chin up and just wanted to report in chart.    "

## 2018-10-19 NOTE — IP AVS SNAPSHOT
MRN:[id number]                      After Visit Summary   10/19/2018    Helene Gibbs    MRN: [id number]           Thank you!     Thank you for choosing [facility] for your care. Our goal is always to provide you with excellent care. Hearing back from our patients is one way we can continue to improve our services. Please take a few minutes to complete the written survey that you may receive in the mail after you visit with us. Thank you!        Patient Information     Date Of Birth          1935        About your hospital stay     You were admitted on:  October 20, 2018 You last received care in the:  [place] [address] [facility]    You were discharged on:  October 26, 2018        Reason for your hospital stay       Right bipolar hemiarthroplasty for femoral neck fracture                  Who to Call     For medical emergencies, please call 911.  For non-urgent questions about your medical care, please call your primary care provider or clinic, [phone]  For questions related to your surgery, please call your surgery clinic        Attending Provider     Provider Specialty    Mikhail Edmonds MD Emergency Medicine    Reyes, Imer Huynh MD Internal Medicine       Primary Care Provider Office Phone # Fax #    Neisha MARIMAR Esparza [phone]      After Care Instructions     Activity - Up with assistive device           Advance Diet as Tolerated       Follow this diet upon discharge: Orders Placed This Encounter      Room Service      Moderate Consistent CHO Diet            Encourage PO fluids           Fall precautions           General info for SNF       Length of Stay Estimate: Short Term Care: Estimated # of Days <30  Condition at Discharge: Improving  Level of care:skilled   Rehabilitation Potential: Good  Admission H&P remains valid and up-to-date: Yes  Recent Chemotherapy: N/A  Use Nursing Home Standing Orders: Yes            Hip precautions            Mantoux instructions       Give two-step Mantoux (PPD) Per Facility Policy Yes            Oxygen - Nasal cannula       2 Lpm by nasal cannula to keep O2 sats 92% or greater.  Wean as tolerated.            Weight bearing status       WBAT            Wound care       Site:   Right hip  Instructions:  Leave dressing intact if Aquacel and remove at POD #7, remove staples POD #14  Daily dressing changes gauze and tape                  Follow-up Appointments     Follow Up and recommended labs and tests       Follow up with Dr. Fish in 2 weeks.  No follow up labs or test are needed. Call [facility] for appointment [phone]                  Your next 10 appointments already scheduled     Nov 09, 2018  1:45 PM CST   [place] with STONE TECH1   [facility] ([facility])    [address]  [place] [id number] [phone] OPT 2            Nov 19, 2018  1:30 PM CST   New Visit with Hector Moran MD   [facility]    [address]  [address]  [place] [id number] [phone]              Additional Services     Occupational Therapy Adult Consult       Evaluate and treat as clinically indicated.    Reason:  Right bipolar hemiarthroplasty for femoral neck fracture            Physical Therapy Adult Consult       Evaluate and treat as clinically indicated.    Reason:  Right bipolar hemiarthroplasty for femoral neck fracture                  Future tests that were ordered for you     AntiEmbolism Stockings       Bilateral below knee length.On in the morning, off at night                  Pending Results     No orders found from 10/17/2018 to 10/20/2018.            Statement of Approval     Ordered          10/26/18 1040  I have reviewed and agree with all the recommendations and orders detailed in this document.  EFFECTIVE NOW     Approved and electronically signed by:  Ervin Lares MD             Admission Information  "    Date & Time Provider Department Dept. Phone    10/19/2018 Imer Reyes MD [facility] [place] [address] [facility] [phone]      Your Vitals Were     Blood Pressure Pulse Temperature Respirations Height Weight    135/75 (BP Location: Right arm) 72 97.8  F (36.6  C) (Oral) 16 1.651 m (5' 5\") 95.1 kg (209 lb 11.2 oz)    Last Period Pulse Oximetry BMI (Body Mass Index)             (LMP Unknown) 94% 34.9 kg/m2         MyChart Information     [facility] gives you secure access to your electronic health record. If you see a primary care provider, you can also send messages to your care team and make appointments. If you have questions, please call your primary care clinic.  If you do not have a primary care provider, please call [phone] and they will assist you.        Care EveryWhere ID     This is your Care EveryWhere ID. This could be used by other organizations to access your [facility] medical records  [id number]        Equal Access to Services     TONG TUCKER AH: Hadii sukhdeep bundyo Somariia, waaxda luqadaha, qaybta kaalmada adeegyamaile, pamela mccullough . So [facility] [phone].    ATENCIÓN: Si habla español, tiene a gupta disposición servicios gratuitos de asistencia lingüística. Llame al [phone].    We comply with applicable federal civil rights laws and [place] laws. We do not discriminate on the basis of race, color, national origin, age, disability, sex, sexual orientation, or gender identity.               Review of your medicines      START taking        Dose / Directions    acetaminophen 325 MG tablet   Commonly known as:  TYLENOL   Used for:  Closed fracture of left hip, initial encounter (H)        Dose:  650 mg   Take 2 tablets (650 mg) by mouth every 6 hours as needed for mild pain   Quantity:  40 tablet   Refills:  0       cefuroxime 500 MG tablet   Commonly known as:  CEFTIN   Indication:  Community Acquired Pneumonia   Used for:  Community acquired bacterial " pneumonia        Dose:  500 mg   Take 1 tablet (500 mg) by mouth every 12 hours for 7 doses   Refills:  0       clindamycin 300 MG capsule   Commonly known as:  CLEOCIN   Used for:  Dental infection        Dose:  300 mg   Take 1 capsule (300 mg) by mouth 4 times daily for 4 doses   Refills:  0       enoxaparin 40 MG/0.4ML injection   Commonly known as:  LOVENOX   Used for:  Closed fracture of left hip, initial encounter (H)        Dose:  40 mg   Inject 0.4 mLs (40 mg) Subcutaneous every 24 hours   Refills:  0       nystatin 743628 UNIT/ML suspension   Commonly known as:  MYCOSTATIN   Used for:  Thrush        Dose:  981590 Units   Swish and swallow 1 mL (100,000 Units) in mouth 4 times daily   Quantity:  280 mL   Refills:  0       oxyCODONE IR 5 MG tablet   Commonly known as:  ROXICODONE   Used for:  Closed fracture of left hip, initial encounter (H)        Dose:  5 mg   Take 1 tablet (5 mg) by mouth every 4 hours as needed for moderate to severe pain   Quantity:  30 tablet   Refills:  0       polyethylene glycol Packet   Commonly known as:  MIRALAX/GLYCOLAX   Used for:  Constipation, unspecified constipation type        Dose:  17 g   Take 17 g by mouth 2 times daily   Quantity:  7 packet   Refills:  0       senna-docusate 8.6-50 MG per tablet   Commonly known as:  SENOKOT-S;PERICOLACE   Used for:  Closed fracture of left hip, initial encounter (H)        Dose:  2 tablet   Take 2 tablets by mouth 2 times daily   Quantity:  40 tablet   Refills:  0         CONTINUE these medicines which may have CHANGED, or have new prescriptions. If we are uncertain of the size of tablets/capsules you have at home, strength may be listed as something that might have changed.        Dose / Directions    aspirin 325 MG EC tablet   This may have changed:    - medication strength  - how much to take   Used for:  NSTEMI (non-ST elevated myocardial infarction) (H)        Dose:  325 mg   Take 1 tablet (325 mg) by mouth daily   Quantity:  30  tablet   Refills:  0         CONTINUE these medicines which have NOT CHANGED        Dose / Directions    ACCU-CHEK SMARTVIEW test strip   Used for:  Type 2 diabetes mellitus with diabetic nephropathy (H)   Generic drug:  blood glucose monitoring        USE 1 STRIP BY IN VITRO ROUTE 2 TIMES DAILY OR AS DIRECTED   Quantity:  200 strip   Refills:  1       albuterol 108 (90 Base) MCG/ACT inhaler   Commonly known as:  PROAIR HFA/PROVENTIL HFA/VENTOLIN HFA   Used for:  COPD (chronic obstructive pulmonary disease) (H)        Dose:  2 puff   Inhale 2 puffs into the lungs every 4 hours as needed for shortness of breath / dyspnea or wheezing   Quantity:  1 Inhaler   Refills:  5       fluticasone 50 MCG/ACT spray   Commonly known as:  FLONASE        Dose:  1-2 spray   Spray 1-2 sprays into both nostrils daily as needed for rhinitis or allergies   Refills:  0       FUROSEMIDE PO        Dose:  20 mg   Take 20 mg by mouth daily   Refills:  0       GLUCOTROL PO        Dose:  5 mg   Take 5 mg by mouth every morning (before breakfast)   Refills:  0       guaiFENesin 600 MG 12 hr tablet   Commonly known as:  MUCINEX   Used for:  Pneumonia due to infectious organism, unspecified laterality, unspecified part of lung        Dose:  600 mg   Take 1 tablet (600 mg) by mouth 2 times daily   Quantity:  28 tablet   Refills:  0       hydrOXYzine 25 MG capsule   Commonly known as:  VISTARIL   Used for:  Itching        Dose:  25 mg   Take 1 capsule (25 mg) by mouth 2 times daily as needed for itching   Quantity:  180 capsule   Refills:  1       Lidocaine 4 % Patch   Commonly known as:  LIDOCARE        Dose:  3 patch   Place 3 patches onto the skin daily as needed for moderate pain   Refills:  0       metoclopramide 5 MG tablet   Commonly known as:  REGLAN        Dose:  5 mg   Take 1 tablet (5 mg) by mouth 3 times daily as needed   Quantity:  20 tablet   Refills:  0       metoprolol tartrate 25 MG tablet   Commonly known as:  LOPRESSOR   Used  for:  NSTEMI (non-ST elevated myocardial infarction) (H)        Dose:  25 mg   Take 1 tablet (25 mg) by mouth 2 times daily   Quantity:  180 tablet   Refills:  3       ondansetron 4 MG ODT tab   Commonly known as:  ZOFRAN ODT   Used for:  Nausea        Dose:  4 mg   Take 1 tablet (4 mg) by mouth every 6 hours as needed for nausea   Quantity:  30 tablet   Refills:  1       pregabalin 75 MG capsule   Commonly known as:  LYRICA   Used for:  Neuropathy        Dose:  75 mg   Take 1 capsule (75 mg) by mouth 2 times daily   Quantity:  60 capsule   Refills:  3       ranitidine 150 MG tablet   Commonly known as:  ZANTAC   Used for:  Dyspepsia        TAKE ONE TABLET BY MOUTH TWO TIMES A DAY   Quantity:  180 tablet   Refills:  3       simvastatin 10 MG tablet   Commonly known as:  ZOCOR   Used for:  Type 2 diabetes mellitus with diabetic nephropathy, without long-term current use of insulin (H)        TAKE 1 TABLET (10 MG) BY MOUTH AT BEDTIME   Quantity:  90 tablet   Refills:  2       tiotropium 18 MCG capsule   Commonly known as:  SPIRIVA   Used for:  Chronic obstructive pulmonary disease, unspecified COPD type (H)        Dose:  18 mcg   Inhale 1 capsule (18 mcg) into the lungs every evening   Quantity:  30 capsule   Refills:  11         STOP taking     apixaban ANTICOAGULANT 2.5 MG tablet   Commonly known as:  ELIQUIS           diazepam 10 mg Supp           HYDROcodone-acetaminophen 5-325 MG per tablet   Commonly known as:  NORCO           lisinopril 10 MG tablet   Commonly known as:  PRINIVIL/ZESTRIL           magnesium hydroxide 400 MG/5ML suspension   Commonly known as:  MILK OF MAGNESIA           traMADol 50 MG tablet   Commonly known as:  ULTRAM                Where to get your medicines      Some of these will need a paper prescription and others can be bought over the counter. Ask your nurse if you have questions.     Bring a paper prescription for each of these medications     acetaminophen 325 MG tablet    oxyCODONE  IR 5 MG tablet    senna-docusate 8.6-50 MG per tablet       You don't need a prescription for these medications     aspirin 325 MG EC tablet    cefuroxime 500 MG tablet    clindamycin 300 MG capsule    enoxaparin 40 MG/0.4ML injection    nystatin 046862 UNIT/ML suspension    polyethylene glycol Packet                Protect others around you: Learn how to safely use, store and throw away your medicines at www.disposemymeds.org.        ANTIBIOTIC INSTRUCTION     You've Been Prescribed an Antibiotic - Now What?  Your healthcare team thinks that you or your loved one might have an infection. Some infections can be treated with antibiotics, which are powerful, life-saving drugs. Like all medications, antibiotics have side effects and should only be used when necessary. There are some important things you should know about your antibiotic treatment.      Your healthcare team may run tests before you start taking an antibiotic.    Your team may take samples (e.g., from your blood, urine or other areas) to run tests to look for bacteria. These test can be important to determine if you need an antibiotic at all and, if you do, which antibiotic will work best.      Within a few days, your healthcare team might change or even stop your antibiotic.    Your team may start you on an antibiotic while they are working to find out what is making you sick.    Your team might change your antibiotic because test results show that a different antibiotic would be better to treat your infection.    In some cases, once your team has more information, they learn that you do not need an antibiotic at all. They may find out that you don't have an infection, or that the antibiotic you're taking won't work against your infection. For example, an infection caused by a virus can't be treated with antibiotics. Staying on an antibiotic when you don't need it is more likely to be harmful than helpful.      You may experience side effects from your  antibiotic.    Like all medications, antibiotics have side effects. Some of these can be serious.    Let you healthcare team know if you have any known allergies when you are admitted to the hospital.    One significant side effect of nearly all antibiotics is the risk of severe and sometimes deadly diarrhea caused by Clostridium difficile (C. Difficile). This occurs when a person takes antibiotics because some good germs are destroyed. Antibiotic use allows C. diificile to take over, putting patients at high risk for this serious infection.    As a patient or caregiver, it is important to understand your or your loved one's antibiotic treatment. It is especially important for caregivers to speak up when patients can't speak for themselves. Here are some important questions to ask your healthcare team.    What infection is this antibiotic treating and how do you know I have that infection?    What side effects might occur from this antibiotic?    How long will I need to take this antibiotic?    Is it safe to take this antibiotic with other medications or supplements (e.g., vitamins) that I am taking?     Are there any special directions I need to know about taking this antibiotic? For example, should I take it with food?    How will I be monitored to know whether my infection is responding to the antibiotic?    What tests may help to make sure the right antibiotic is prescribed for me?      Information provided by:  www.cdc.gov/getsmart  U.S. Department of Health and Human Services  Centers for disease Control and Prevention  National Center for Emerging and Zoonotic Infectious Diseases  Division of Healthcare Quality Promotion        Information about OPIOIDS     PRESCRIPTION OPIOIDS: WHAT YOU NEED TO KNOW   We gave you an opioid (narcotic) pain medicine. It is important to manage your pain, but opioids are not always the best choice. You should first try all the other options your care team gave you. Take this  medicine for as short a time (and as few doses) as possible.    Some activities can increase your pain, such as bandage changes or therapy sessions. It may help to take your pain medicine 30 to 60 minutes before these activities. Reduce your stress by getting enough sleep, working on hobbies you enjoy and practicing relaxation or meditation. Talk to your care team about ways to manage your pain beyond prescription opioids.    These medicines have risks:    DO NOT drive when on new or higher doses of pain medicine. These medicines can affect your alertness and reaction times, and you could be arrested for driving under the influence (DUI). If you need to use opioids long-term, talk to your care team about driving.    DO NOT operate heavy machinery    DO NOT do any other dangerous activities while taking these medicines.    DO NOT drink any alcohol while taking these medicines.     If the opioid prescribed includes acetaminophen, DO NOT take with any other medicines that contain acetaminophen. Read all labels carefully. Look for the word  acetaminophen  or  Tylenol.  Ask your pharmacist if you have questions or are unsure.    You can get addicted to pain medicines, especially if you have a history of addiction (chemical, alcohol or substance dependence). Talk to your care team about ways to reduce this risk.    All opioids tend to cause constipation. Drink plenty of water and eat foods that have a lot of fiber, such as fruits, vegetables, prune juice, apple juice and high-fiber cereal. Take a laxative (Miralax, milk of magnesia, Colace, Senna) if you don t move your bowels at least every other day. Other side effects include upset stomach, sleepiness, dizziness, throwing up, tolerance (needing more of the medicine to have the same effect), physical dependence and slowed breathing.    Store your pills in a secure place, locked if possible. We will not replace any lost or stolen medicine. If you don t finish your  medicine, please throw away (dispose) as directed by your pharmacist. The [place] Pollution Control Agency has more information about safe disposal: https://www.pca.state.[place]/living-green/managing-unwanted-medications             Medication List: This is a list of all your medications and when to take them. Check marks below indicate your daily home schedule. Keep this list as a reference.      Medications           Morning Afternoon Evening Bedtime As Needed    ACCU-CHEK SMARTVIEW test strip   USE 1 STRIP BY IN VITRO ROUTE 2 TIMES DAILY OR AS DIRECTED   Generic drug:  blood glucose monitoring                                acetaminophen 325 MG tablet   Commonly known as:  TYLENOL   Take 2 tablets (650 mg) by mouth every 6 hours as needed for mild pain   Last time this was given:  650 mg on 10/26/2018 12:23 AM                                albuterol 108 (90 Base) MCG/ACT inhaler   Commonly known as:  PROAIR HFA/PROVENTIL HFA/VENTOLIN HFA   Inhale 2 puffs into the lungs every 4 hours as needed for shortness of breath / dyspnea or wheezing                                aspirin 325 MG EC tablet   Take 1 tablet (325 mg) by mouth daily   Last time this was given:  81 mg on 10/26/2018 10:01 AM                                cefuroxime 500 MG tablet   Commonly known as:  CEFTIN   Take 1 tablet (500 mg) by mouth every 12 hours for 7 doses   Last time this was given:  500 mg on 10/26/2018  1:53 PM                                clindamycin 300 MG capsule   Commonly known as:  CLEOCIN   Take 1 capsule (300 mg) by mouth 4 times daily for 4 doses   Last time this was given:  300 mg on 10/25/2018  8:47 AM                                enoxaparin 40 MG/0.4ML injection   Commonly known as:  LOVENOX   Inject 0.4 mLs (40 mg) Subcutaneous every 24 hours   Last time this was given:  40 mg on 10/26/2018 11:42 AM                                fluticasone 50 MCG/ACT spray   Commonly known as:  FLONASE   Spray 1-2 sprays into both  nostrils daily as needed for rhinitis or allergies                                FUROSEMIDE PO   Take 20 mg by mouth daily                                GLUCOTROL PO   Take 5 mg by mouth every morning (before breakfast)                                guaiFENesin 600 MG 12 hr tablet   Commonly known as:  MUCINEX   Take 1 tablet (600 mg) by mouth 2 times daily   Last time this was given:  600 mg on 10/26/2018 10:01 AM                                hydrOXYzine 25 MG capsule   Commonly known as:  VISTARIL   Take 1 capsule (25 mg) by mouth 2 times daily as needed for itching                                Lidocaine 4 % Patch   Commonly known as:  LIDOCARE   Place 3 patches onto the skin daily as needed for moderate pain                                metoclopramide 5 MG tablet   Commonly known as:  REGLAN   Take 1 tablet (5 mg) by mouth 3 times daily as needed                                metoprolol tartrate 25 MG tablet   Commonly known as:  LOPRESSOR   Take 1 tablet (25 mg) by mouth 2 times daily   Last time this was given:  12.5 mg on 10/26/2018 10:01 AM                                nystatin [id number] UNIT/ML suspension   Commonly known as:  MYCOSTATIN   Swish and swallow 1 mL (100,000 Units) in mouth 4 times daily   Last time this was given:  100,000 Units on 10/26/2018  1:31 PM                                ondansetron 4 MG ODT tab   Commonly known as:  ZOFRAN ODT   Take 1 tablet (4 mg) by mouth every 6 hours as needed for nausea                                oxyCODONE IR 5 MG tablet   Commonly known as:  ROXICODONE   Take 1 tablet (5 mg) by mouth every 4 hours as needed for moderate to severe pain   Last time this was given:  5 mg on 10/26/2018  1:42 PM                                polyethylene glycol Packet   Commonly known as:  MIRALAX/GLYCOLAX   Take 17 g by mouth 2 times daily   Last time this was given:  17 g on 10/26/2018 10:02 AM                                pregabalin 75 MG capsule   Commonly  known as:  LYRICA   Take 1 capsule (75 mg) by mouth 2 times daily   Last time this was given:  75 mg on 10/22/2018  8:33 AM                                ranitidine 150 MG tablet   Commonly known as:  ZANTAC   TAKE ONE TABLET BY MOUTH TWO TIMES A DAY   Last time this was given:  150 mg on 10/25/2018  9:02 PM                                senna-docusate 8.6-50 MG per tablet   Commonly known as:  SENOKOT-S;PERICOLACE   Take 2 tablets by mouth 2 times daily   Last time this was given:  2 tablets on 10/26/2018 10:00 AM                                simvastatin 10 MG tablet   Commonly known as:  ZOCOR   TAKE 1 TABLET (10 MG) BY MOUTH AT BEDTIME   Last time this was given:  10 mg on 10/25/2018  9:02 PM                                tiotropium 18 MCG capsule   Commonly known as:  SPIRIVA   Inhale 1 capsule (18 mcg) into the lungs every evening

## 2018-10-19 NOTE — IP AVS SNAPSHOT
[address] [facility]    [address] [place] [address]    Phone:  [phone]                                       After Visit Summary   10/19/2018    Helene Gibbs    MRN: [id number]           After Visit Summary Signature Page     I have received my discharge instructions, and my questions have been answered. I have discussed any challenges I see with this plan with the nurse or doctor.    ..........................................................................................................................................  Patient/Patient Representative Signature      ..........................................................................................................................................  Patient Representative Print Name and Relationship to Patient    ..................................................               ................................................  Date                                   Time    ..........................................................................................................................................  Reviewed by Signature/Title    ...................................................              ..............................................  Date                                               Time          22EPIC Rev 08/18

## 2018-10-19 NOTE — IP AVS SNAPSHOT
"` [place] [address] [facility]: [phone]                                              INTERAGENCY TRANSFER FORM - NURSING   10/19/2018                    Hospital Admission Date: 10/19/2018  PATRICIA BOBO   : 1935  Sex: Female        Attending Provider: Imer Reyes MD     Allergies:  Ambien [Zolpidem Tartrate], Penicillins, Definity, Sulfa Drugs, Cymbalta, Fluconazole    Infection:  None   Service:  HOSPITALIST    Ht:  1.651 m (5' 5\")   Wt:  95.1 kg (209 lb 11.2 oz)   Admission Wt:  72.6 kg (160 lb)    BMI:  34.9 kg/m 2   BSA:  2.09 m 2            Patient PCP Information     Provider PCP Type    Neisha Esparza MD General      Current Code Status     Date Active Code Status Order ID Comments User Context       10/20/2018  1:29 AM DNR/DNI [id number]  Imer Reyes MD Inpatient       Questions for Current Code Status     Question Answer Comment    Code status determined by: Discussion with patient/legal decision maker       Code Status History     Date Active Date Inactive Code Status Order ID Comments User Context    2018 11:28 AM 2018  3:44 PM DNR/DNI [id number]  Wale Jeong MD Inpatient    2018  9:38 AM 2018 11:28 AM DNR/DNI [id number]  Dolores Magallanes PA-C Outpatient    2018  1:03 AM 2018  9:38 AM DNR/DNI [id number]  Imer Reyes MD Inpatient    2018 12:50 PM 2018  1:03 AM Full Code [id number]  Pieter Watson MD Outpatient    2018  6:09 PM 2018 12:50 PM Full Code [id number]  Aysha Mejia MD Inpatient    2017 11:01 AM 2018  6:09 PM Full Code [id number]  Michelle Zuniga MD Outpatient    2017  8:20 PM 2017 11:01 AM Full Code [id number]  Jules Rodrigues MD Inpatient    2016  8:45 AM 2017  8:20 PM DNR/DNI [id number]  Navarro Sanchez PA-C Outpatient    2015 10:05 AM 2016  8:45 AM DNR/DNI [id number]  Dolores Magallanes PA-C " Inpatient    12/10/2015 12:11 PM 12/18/2015 10:05 AM DNR/DNI [id number]  Terrance Delgado MD Outpatient    12/8/2015  4:03 PM 12/10/2015 12:11 PM DNR/DNI [id number]  Elpidio Dolores LindquistARJUN Ritchie-C Inpatient    10/16/2015  9:48 AM 12/8/2015  4:03 PM Full Code [id number]  Aashish Moulton MD Outpatient    10/15/2015  5:45 PM 10/16/2015  9:48 AM DNR/DNI [id number]  Victor Hugo Taylor MD ED    9/17/2014 10:10 AM 10/15/2015  5:45 PM DNR/DNI [id number]  Aashish Moulton MD Outpatient    9/10/2014 10:01 PM 9/17/2014 10:10 AM DNR/DNI [id number]  Cassandra Villar PA ED    7/14/2014 10:34 AM 9/10/2014 10:01 PM DNR/DNI [id number]  Victor Hugo Taylor MD Outpatient    7/11/2014  9:35 AM 7/14/2014 10:34 AM DNR/DNI [id number]  Duran Abbott PA Inpatient    7/1/2014  8:12 AM 7/11/2014  9:35 AM DNR/DNI [id number]  Keila Cerda PA-C Outpatient    6/30/2014  8:07 AM 7/1/2014  8:12 AM DNR/DNI [id number]  Keila Cerda PA-C Inpatient    4/14/2014  4:09 PM 6/30/2014  8:07 AM DNR/DNI [id number]  Aashish Moulton MD Outpatient    4/12/2014 10:36 PM 4/14/2014  4:09 PM DNR/DNI [id number]  Aashish Moulton MD Inpatient    12/16/2012 10:03 AM 4/12/2014 10:36 PM DNR/DNI [id number]  David Blankenship MD Outpatient    12/14/2012  2:52 AM 12/16/2012 10:03 AM DNR/DNI [id number]  Ondina Bagley MD Inpatient    10/25/2012  1:37 PM 10/29/2012  4:21 PM Full Code [id number]  Armando Benitez MD Inpatient    10/21/2012  1:55 PM 10/25/2012  1:37 PM Full Code [id number]  David Blankenship MD Outpatient    10/18/2012 11:14 AM 10/21/2012  1:55 PM Full Code [id number]  Zina Sosa RN Inpatient    9/20/2012  8:58 AM 10/18/2012 11:14 AM Full Code [id number]  Giancarlo Metzger MD Outpatient    8/12/2012  2:18 PM 9/20/2012  8:58 AM Full Code [id number]  Davis Olivier MD Outpatient    8/8/2012 12:52 PM 8/12/2012  2:18 PM Full Code [id number]  Samaria Broussard, RN  Inpatient    3/11/2012 11:01 AM 8/8/2012 12:52 PM Full Code [id number]  Jung Verma MD Outpatient    3/10/2012  2:16 PM 3/11/2012 11:01 AM Full Code [id number]  Jung Verma MD Inpatient      Advance Directives        Scanned docmt in ACP Activity?           Yes, scanned ACP docmt        Hospital Problems as of 10/26/2018              Priority Class Noted POA    Leg fracture, left Medium  10/20/2018 Yes      Non-Hospital Problems as of 10/26/2018              Priority Class Noted    Permanent atrial fibrillation (H) Medium  8/1/2008    Type 2 diabetes mellitus with diabetic nephropathy (H) Medium  12/7/2010    Anemia Medium  6/7/2011    CKD (chronic kidney disease) stage 3, GFR 30-59 ml/min (H) Medium  8/10/2011    Chronic low back pain Medium  8/31/2012    Pleural effusion, left Medium  10/1/2012    Adjustment reaction with anxiety and depression Medium  11/1/2012    c diff colitis 11-12-12 Medium  11/13/2012    Pulmonary hypertension (H) Medium  Unknown    Melanosis of colon Medium  1/29/2013    Diplopia Medium  4/12/2014    COPD (chronic obstructive pulmonary disease) (H) Medium  4/28/2014    Elevated TSH Medium  4/28/2014    CHF (congestive heart failure) (H) Medium  5/9/2014    Cardiac pacemaker in situ Medium  5/22/2014    Neck pain Medium  6/11/2014    Intractable back pain Medium  7/11/2014    Health Care Home Medium  7/15/2014    Mild cognitive impairment SLUMS = 22/ 30  CPT = 5.0/ 5.5 7-2014 Medium  7/28/2014    Hypertension goal BP (blood pressure) < 140/90 Medium  8/12/2014    Depression with anxiety Medium  8/12/2014    Tobacco abuse: [age]y/o on 8-12-14 @ 1ppd=50 pk yr hx  Medium  8/12/2014    Stasis dermatitis Medium  8/12/2014    Thoracic disc herniation Medium  9/10/2014    Obesity Medium  9/30/2014    Hyperlipidemia Medium  1/13/2015    Nausea and vomiting Medium  12/8/2015    Cholelithiasis with acute on chronic cholecystitis Medium  12/14/2015    Slow transit constipation Medium  1/15/2016     Gastroesophageal reflux disease without esophagitis Medium  1/19/2016    Basal cell carcinoma of skin Medium  9/27/2016    Chronic pain syndrome Medium  9/27/2016    Tubular adenoma Medium  10/10/2016    Gastrointestinal hemorrhage, unspecified gastrointestinal hemorrhage type Medium  9/11/2017    Iron deficiency Medium  9/14/2017    Adverse effect of iron Medium  9/14/2017    History of bone marrow biopsy Medium  9/19/2017    Herpes zoster without complication Medium  10/4/2017    Controlled substance agreement signed Medium  11/1/2017    Other chronic pain Medium  11/1/2017    Hematoma of groin Medium  1/4/2018    Groin hematoma, initial encounter Medium  1/4/2018    Pulmonary nodule Medium  4/11/2018    Chest pressure Medium  5/6/2018    NSTEMI (non-ST elevated myocardial infarction) (H) Medium  5/6/2018    (HFpEF) heart failure with preserved ejection fraction (H) Medium  5/14/2018    Generalized weakness Medium  7/21/2018      Immunizations     Name Date      Influenza (H1N1) 12/28/09     Influenza (High Dose) 3 valent vaccine 10/16/17     Influenza (High Dose) 3 valent vaccine 09/27/16     Influenza (High Dose) 3 valent vaccine 10/27/15     Influenza (High Dose) 3 valent vaccine 09/22/14     Influenza (High Dose) 3 valent vaccine 10/01/13     Influenza (IIV3) PF 10/19/12     Influenza (IIV3) PF 10/04/11     Influenza (IIV3) PF 10/05/10     Influenza (IIV3) PF 09/16/09     Pneumo Conj 13-V (2010&after) 04/02/15     Pneumococcal 23 valent 08/09/10     TD (ADULT, 7+) 08/09/10     Zoster vaccine recombinant adjuvanted (SHINGRIX) 04/11/18     Zoster vaccine, live 11/23/09          END      ASSESSMENT     Discharge Profile Flowsheet     EXPECTED DISCHARGE     FINAL RESOURCES      Expected Discharge Date  10/26/18 (TCU) 10/24/18 1533   Resources List  Skilled Nursing Facility 10/26/18 1136    DISCHARGE NEEDS ASSESSMENT     Skilled Nursing [facility] [phone], Fax: [phone] 10/26/18  1136    Concerns To Be Addressed  discharge planning concerns 10/28/12 1552   PAS Number  [id number] 10/26/18 1136    Equipment Currently Used at Home  walker, rolling 10/21/18 0946   Existing Resources/Services  Home Care;Housekeeping/Chore Agency 10/27/14 1307    Transportation Available  family or friend will provide 10/21/18 0946   SKIN      # of Referrals Placed by CTS  Post Acute Facilities 10/23/18 1501   Inspection of bony prominences  Full 10/25/18 2340    Primary Care Clinic Name  [facility] 05/08/18 1007   Focused inspection of bony prominences  Hip, left 10/23/18 1404    Primary Care MD Name  Dr. Esparza 05/08/18 1007   Full except areas not inspected   Buttock, left;Buttock, right;Sacrum;Coccyx;Spine;Scapula, left;Scapula, right 10/20/18 1210    Equipment Used at Home  walker, rolling 03/17/16 0913   Skin WDL  ex 10/25/18 2340    GASTROINTESTINAL (ADULT,PEDIATRIC,OB)     Skin Color/Characteristics  bruised (ecchymotic) 10/25/18 2340    GI WDL  WDL 10/25/18 2340   Skin Temperature  warm 10/25/18 2340    All Quadrants Bowel Sounds  audible and active in all quadrants 10/25/18 0903   Skin Integrity  bruise(s);incision(s) 10/25/18 2340    Last Bowel Movement  10/24/18 10/25/18 0903   Inspection under devices  Full 10/25/18 2340    GI Signs/Symptoms  constipation 10/24/18 1540   SAFETY      Passing flatus  yes 10/25/18 1650   Safety WDL  WDL 10/26/18 0159    COMMUNICATION ASSESSMENT     Safety Factors  bed in low position;wheels locked;call light in reach;upper side rails raised x 2;ID band on 10/26/18 0159    Patient's communication style  spoken language (English or Bilingual) 10/19/18 2123   All Alarms  alarm(s) activated and audible 10/26/18 0159                 Assessment WDL (Within Defined Limits) Definitions           Safety WDL     Effective: 09/28/15    Row Information: <b>WDL Definition:</b> Bed in low position, wheels locked; call light in reach; upper side rails up x 2; ID band on<br> <font  "color=\"gray\"><i>Item=AS safety wdl>>List=AS safety wdl>>Version=F14</i></font>      Skin WDL     Effective: 09/28/15    Row Information: <b>WDL Definition:</b> Warm; dry; intact; elastic; without discoloration; pressure points without redness<br> <font color=\"gray\"><i>Item=AS skin wdl>>List=AS skin wdl>>Version=F14</i></font>      Vitals     Vital Signs Flowsheet     QUICK ADDS     Pain Orientation  Left 10/26/18 0756    Quick Adds  Comments 10/22/18 1143   Pain Descriptors  Aching 10/25/18 1303    VITAL SIGNS     Pain Intervention(s)  Medication (See eMAR) 10/26/18 0756    Temp  97.8  F (36.6  C) 10/26/18 0736   Response to Interventions  Absence of nonverbal indicators of pain 10/26/18 0154    Temp src  Oral 10/26/18 0736   ANALGESIA SIDE EFFECTS MONITORING      Resp  16 10/26/18 0736   Side Effects Monitoring: Respiratory Quality  R 10/26/18 0756    Pulse  72 10/26/18 0736   Side Effects Monitoring: Respiratory Depth  N 10/26/18 0756    Heart Rate  67 10/26/18 0736   Side Effects Monitoring: Sedation Level  1 10/26/18 0756    Pulse/Heart Rate Source  Monitor 10/26/18 0736   BRIGID COMA SCALE      BP  135/75 10/26/18 0736   Best Eye Response  4-->(E4) spontaneous 10/23/18 1608    BP Location  Right arm 10/26/18 0736   Best Motor Response  6-->(M6) obeys commands 10/23/18 1608    Patient Position  Lying 10/20/18 1031   Best Verbal Response  5-->(V5) oriented 10/23/18 1608    LYING ORTHOSTATIC BP     Brigid Coma Scale Score  15 10/23/18 1608    Lying Orthostatic BP  98/47 10/22/18 1143   HEIGHT AND WEIGHT      Lying Orthostatic Pulse  64 bpm 10/22/18 1143   Height  1.651 m (5' 5\") 10/19/18 2203    SITTING ORTHOSTATIC BP     Height Method  Stated 10/19/18 2203    Sitting Orthostatic BP  79/39 10/22/18 1143   Weight  95.1 kg (209 lb 11.2 oz) 10/25/18 0706    Sitting Orthostatic Pulse  56 bpm 10/22/18 1143   Weight Method  Bed scale 10/25/18 0706    OXYGEN THERAPY     Bed Scale  Standard (fitted sheet, draw " sheet/ pad, cover/flat sheet, blanket, two pillows);Call light 10/25/18 0706    SpO2  94 % 10/26/18 0736   BSA (Calculated - sq m)  1.82 10/19/18 2203    O2 Device  None (Room air) 10/26/18 0736   BMI (Calculated)  26.68 10/19/18 2203    Oxygen Delivery  2 LPM 10/26/18 0021   POSITIONING      RESPIRATORY MONITORING     Body Position  weight shift assistance provided 10/26/18 0158    Respiratory Monitoring (EtCO2)  35 mmHg 10/20/18 1611   Head of Bed (HOB)  HOB at 20-30 degrees 10/26/18 0158    Integrated Pulmonary Index (IPI)  10 10/20/18 1611   Positioning/Transfer Devices  mechanical lift utilized;pillows;in use 10/26/18 0158    PACEMAKER     ECG      Pacemaker  Permanent 10/26/18 1205   ECG Rhythm  Paced rhythm 10/20/18 1146    PAIN/COMFORT     DAILY CARE      Patient Currently in Pain  yes 10/26/18 0756   Activity Management  activity adjusted per tolerance 10/25/18 2340    Preferred Pain Scale  number (Numeric Rating Pain Scale) 10/26/18 0756   Activity Assistance Provided  assistance, 2 people 10/25/18 2340    0-10 Pain Scale  8 10/26/18 1343   Assistive Device Utilized  mechanical lift 10/25/18 2340    Pain Location  Hip (& Back) 10/26/18 0756                 Patient Lines/Drains/Airways Status    Active LINES/DRAINS/AIRWAYS     Name: Placement date: Placement time: Site: Days: Last dressing change:    Peripheral IV 10/23/18 Right Lower forearm 10/23/18   1730   Lower forearm   2     Incision/Surgical Site 10/20/18 Left Hip 10/20/18   0939    6             Patient Lines/Drains/Airways Status    Active PICC/CVC     None            Intake/Output Detail Report     Date Intake         Output   Net    Shift P.O. I.V. IV Piggyback Colloid Blood Components Total Urine Blood Total       Noc 10/24/18 2300 - 10/25/18 0659 120 1993 -- -- -- 2113 450 -- 450 1663    Day 10/25/18 0700 - 10/25/18 1459 400 775 -- -- -- 1175 375 -- 375 800    Nay 10/25/18 1500 - 10/25/18 2259 120 -- -- -- -- 120 100 -- 100 20    Noc  10/25/18 2300 - 10/26/18 0659 120 -- -- -- -- 120 -- -- -- 120    Day 10/26/18 0700 - 10/26/18 1459 600 -- -- -- -- 600 -- -- -- 600      Last Void/BM       Most Recent Value    Urine Occurrence 1 at 10/26/2018 1200    Stool Occurrence 1 at 10/25/2018 1700      Case Management/Discharge Planning     Case Management/Discharge Planning Flowsheet     REFERRAL INFORMATION     Concerns To Be Addressed  discharge planning concerns 10/28/12 1552    Did the Initial Social Work Assessment result in a Social Work Case?  Yes 10/23/18 1501   DISCHARGE PLANNING      Admission Type  inpatient 10/23/18 1501   Transportation Available  family or friend will provide 10/21/18 0946    Arrived From  home or self-care 10/23/18 1501   Skilled Nursing Facility  [facility] 09/20/12 1507    Referral Source  physician 10/23/18 1501   Skilled Nursing Facility Phone Number  [phone] 09/20/12 1507    # of Referrals Placed by CTS  Post Acute Facilities 10/23/18 1501   Equipment Used at Home  walker, rolling 03/17/16 0913    Post Acute Facilities  TCU 10/23/18 1501   Additional Equipment Needed  -- (portable oxygen) 09/20/12 1507    Reason For Consult  discharge planning 10/23/18 1501   Equipment Company Phone Number  [phone] 09/20/12 1507    Record Reviewed  medical record 10/23/18 1501   FINAL RESOURCES      CTS Assigned to Case  Ирина Thomas  10/26/18 1136   Equipment Currently Used at Home  walker, rolling 10/21/18 0946    Primary Care Clinic Name  [facility] 05/08/18 1007   Resources List  Skilled Nursing Facility 10/26/18 1136    Primary Care MD Name  Dr. Esparza 05/08/18 1007   Skilled Nursing Facility  [facility] [phone], Fax: [phone] 10/26/18 1130    LIVING ENVIRONMENT     PAS Number  [id number] 10/26/18 1136    Lives With  alone 10/23/18 1501   Existing Resources/Services  Home Care;Housekeeping/Chore Agency 10/27/14 1307    Living Arrangements  apartment 10/23/18 1501   ABUSE RISK SCREEN      ASSESSMENT OF  FAMILY/SOCIAL SUPPORT     QUESTION TO PATIENT:  Has a member of your family or a partner(now or in the past) intimidated, hurt, manipulated, or controlled you in any way?  no 10/19/18 2128    Marital Status   10/23/18 1501   QUESTION TO PATIENT: Do you feel safe going back to the place where you are living?  yes 10/19/18 2128    COPING/STRESS     OBSERVATION: Is there reason to believe there has been maltreatment of a vulnerable adult (ie. Physical/Sexual/Emotional abuse, self neglect, lack of adequate food, shelter, medical care, or financial exploitation)?  no 10/19/18 2128    Major Change/Loss/Stressor  none 10/20/18 0129   OTHER      Patient Personal Strengths  assertive 12/20/15 1255   Are you depressed or being treated for depression?  No 10/20/18 0129    EXPECTED DISCHARGE     HOMICIDE RISK      Expected Discharge Date  10/26/18 (TCU) 10/24/18 1533   Feels Like Hurting Others  no 10/19/18 2128    ASSESSMENT/CONCERNS TO BE ADDRESSED

## 2018-10-19 NOTE — IP AVS SNAPSHOT
"[place] [address] [facility]: [phone]                                              INTERAGENCY TRANSFER FORM - PHYSICIAN ORDERS   10/19/2018                    Hospital Admission Date: 10/19/2018  PATRICIA BOBO   : 1935  Sex: Female        Attending Provider: Imer Reyes MD     Allergies:  Ambien [Zolpidem Tartrate], Penicillins, Definity, Sulfa Drugs, Cymbalta, Fluconazole    Infection:  None   Service:  HOSPITALIST    Ht:  1.651 m (5' 5\")   Wt:  95.1 kg (209 lb 11.2 oz)   Admission Wt:  72.6 kg (160 lb)    BMI:  34.9 kg/m 2   BSA:  2.09 m 2            Patient PCP Information     Provider PCP Type    Neisha Esparza MD General      ED Clinical Impression     Diagnosis Description Comment Added By Time Added    Closed fracture of left hip, initial encounter (H) [S72.002A] Closed fracture of left hip, initial encounter (H) [S72.002A]  Mikhail Edmonds MD 10/19/2018 10:42 PM    Closed head injury, initial encounter [S09.90XA] Closed head injury, initial encounter [S09.90XA]  Mikhail Edmonds MD 10/19/2018 10:42 PM    Chronic obstructive pulmonary disease, unspecified COPD type (H) [J44.9] Chronic obstructive pulmonary disease, unspecified COPD type (H) [J44.9]  Mikhail Edmonds MD 10/19/2018 10:42 PM    Anemia, unspecified type [D64.9] Anemia, unspecified type [D64.9]  Mikhail Edmonds MD 10/19/2018 11:17 PM    Renal insufficiency [N28.9] Renal insufficiency [N28.9]  Mikhail Edmonds MD 10/19/2018 11:17 PM    Elevated LFTs [R94.5] Elevated LFTs [R94.5]  Mikhail Edmonds MD 10/19/2018 11:17 PM    Dental infection [K04.7] Dental infection [K04.7]  Mikhail Edmonds MD 10/19/2018 11:19 PM      Hospital Problems as of 10/26/2018              Priority Class Noted POA    Leg fracture, left Medium  10/20/2018 Yes      Non-Hospital Problems as of 10/26/2018              Priority Class Noted    Permanent atrial fibrillation (H) Medium  2008 "    Type 2 diabetes mellitus with diabetic nephropathy (H) Medium  12/7/2010    Anemia Medium  6/7/2011    CKD (chronic kidney disease) stage 3, GFR 30-59 ml/min (H) Medium  8/10/2011    Chronic low back pain Medium  8/31/2012    Pleural effusion, left Medium  10/1/2012    Adjustment reaction with anxiety and depression Medium  11/1/2012    c diff colitis 11-12-12 Medium  11/13/2012    Pulmonary hypertension (H) Medium  Unknown    Melanosis of colon Medium  1/29/2013    Diplopia Medium  4/12/2014    COPD (chronic obstructive pulmonary disease) (H) Medium  4/28/2014    Elevated TSH Medium  4/28/2014    CHF (congestive heart failure) (H) Medium  5/9/2014    Cardiac pacemaker in situ Medium  5/22/2014    Neck pain Medium  6/11/2014    Intractable back pain Medium  7/11/2014    Health Care Home Medium  7/15/2014    Mild cognitive impairment SLUMS = 22/ 30  CPT = 5.0/ 5.5 7-2014 Medium  7/28/2014    Hypertension goal BP (blood pressure) < 140/90 Medium  8/12/2014    Depression with anxiety Medium  8/12/2014    Tobacco abuse: [age]y/o on 8-12-14 @ 1ppd=50 pk yr hx  Medium  8/12/2014    Stasis dermatitis Medium  8/12/2014    Thoracic disc herniation Medium  9/10/2014    Obesity Medium  9/30/2014    Hyperlipidemia Medium  1/13/2015    Nausea and vomiting Medium  12/8/2015    Cholelithiasis with acute on chronic cholecystitis Medium  12/14/2015    Slow transit constipation Medium  1/15/2016    Gastroesophageal reflux disease without esophagitis Medium  1/19/2016    Basal cell carcinoma of skin Medium  9/27/2016    Chronic pain syndrome Medium  9/27/2016    Tubular adenoma Medium  10/10/2016    Gastrointestinal hemorrhage, unspecified gastrointestinal hemorrhage type Medium  9/11/2017    Iron deficiency Medium  9/14/2017    Adverse effect of iron Medium  9/14/2017    History of bone marrow biopsy Medium  9/19/2017    Herpes zoster without complication Medium  10/4/2017    Controlled substance agreement signed Medium  11/1/2017     Other chronic pain Medium  11/1/2017    Hematoma of groin Medium  1/4/2018    Groin hematoma, initial encounter Medium  1/4/2018    Pulmonary nodule Medium  4/11/2018    Chest pressure Medium  5/6/2018    NSTEMI (non-ST elevated myocardial infarction) (H) Medium  5/6/2018    (HFpEF) heart failure with preserved ejection fraction (H) Medium  5/14/2018    Generalized weakness Medium  7/21/2018      Code Status History     Date Active Date Inactive Code Status Order ID Comments User Context    7/21/2018 11:28 AM 7/23/2018  3:44 PM DNR/DNI [id number]  Wale Jeong MD Inpatient    5/8/2018  9:38 AM 7/21/2018 11:28 AM DNR/DNI [id number]  Dolores Magallanes PA-C Outpatient    5/6/2018  1:03 AM 5/8/2018  9:38 AM DNR/DNI [id number]  Imer Reyes MD Inpatient    1/5/2018 12:50 PM 5/6/2018  1:03 AM Full Code [id number]  Pieter Watson MD Outpatient    1/4/2018  6:09 PM 1/5/2018 12:50 PM Full Code [id number]  Aysha Mejia MD Inpatient    8/11/2017 11:01 AM 1/4/2018  6:09 PM Full Code [id number]  Michelle Zuniga MD Outpatient    8/9/2017  8:20 PM 8/11/2017 11:01 AM Full Code [id number]  Jules Rodrigues MD Inpatient    1/5/2016  8:45 AM 8/9/2017  8:20 PM DNR/DNI [id number]  Navarro Sanchez PA-C Outpatient    12/18/2015 10:05 AM 1/5/2016  8:45 AM DNR/DNI [id number]  Dolores Magallanes PA-C Inpatient    12/10/2015 12:11 PM 12/18/2015 10:05 AM DNR/DNI [id number]  Terrance Delgado MD Outpatient    12/8/2015  4:03 PM 12/10/2015 12:11 PM DNR/DNI [id number]  Dolores Magallanes PA-C Inpatient    10/16/2015  9:48 AM 12/8/2015  4:03 PM Full Code [id number]  Aashish Moulton MD Outpatient    10/15/2015  5:45 PM 10/16/2015  9:48 AM DNR/DNI [id number]  Victor Hugo Taylor MD ED    9/17/2014 10:10 AM 10/15/2015  5:45 PM DNR/DNI [id number]  Aashish Moulton MD Outpatient    9/10/2014 10:01 PM 9/17/2014 10:10 AM DNR/DNI [id number]  Cassandra Villar,  PA ED    7/14/2014 10:34 AM 9/10/2014 10:01 PM DNR/DNI [id number]  Victor Hugo Taylor MD Outpatient    7/11/2014  9:35 AM 7/14/2014 10:34 AM DNR/DNI [id number]  Duran Abbott PA Inpatient    7/1/2014  8:12 AM 7/11/2014  9:35 AM DNR/DNI [id number]  Keila Cerda PA-C Outpatient    6/30/2014  8:07 AM 7/1/2014  8:12 AM DNR/DNI [id number]  Keila Cerda PA-C Inpatient    4/14/2014  4:09 PM 6/30/2014  8:07 AM DNR/DNI [id number]  Aashish Moulton MD Outpatient    4/12/2014 10:36 PM 4/14/2014  4:09 PM DNR/DNI [id number]  Aashish Moulton MD Inpatient    12/16/2012 10:03 AM 4/12/2014 10:36 PM DNR/DNI [id number]  David Blankenship MD Outpatient    12/14/2012  2:52 AM 12/16/2012 10:03 AM DNR/DNI [id number]  Ondina Bagley MD Inpatient    10/25/2012  1:37 PM 10/29/2012  4:21 PM Full Code [id number]  Armando Benitez MD Inpatient    10/21/2012  1:55 PM 10/25/2012  1:37 PM Full Code [id number]  David Blankenship MD Outpatient    10/18/2012 11:14 AM 10/21/2012  1:55 PM Full Code [id number]  Zina Sosa RN Inpatient    9/20/2012  8:58 AM 10/18/2012 11:14 AM Full Code [id number]  Giancarlo Metzger MD Outpatient    8/12/2012  2:18 PM 9/20/2012  8:58 AM Full Code [id number]  Davis Olivier MD Outpatient    8/8/2012 12:52 PM 8/12/2012  2:18 PM Full Code [id number]  Samaria Broussard RN Inpatient    3/11/2012 11:01 AM 8/8/2012 12:52 PM Full Code [id number]  Jung Verma MD Outpatient    3/10/2012  2:16 PM 3/11/2012 11:01 AM Full Code [id number]  Jung Verma MD Inpatient         Medication Review      START taking        Dose / Directions Comments    acetaminophen 325 MG tablet   Commonly known as:  TYLENOL   Used for:  Closed fracture of left hip, initial encounter (H)        Dose:  650 mg   Take 2 tablets (650 mg) by mouth every 6 hours as needed for mild pain   Quantity:  40 tablet   Refills:  0        cefuroxime 500 MG tablet   Commonly known as:  CEFTIN    Indication:  Community Acquired Pneumonia   Used for:  Community acquired bacterial pneumonia        Dose:  500 mg   Take 1 tablet (500 mg) by mouth every 12 hours for 7 doses   Refills:  0        clindamycin 300 MG capsule   Commonly known as:  CLEOCIN   Used for:  Dental infection        Dose:  300 mg   Take 1 capsule (300 mg) by mouth 4 times daily for 4 doses   Refills:  0    As needed, to be given day prior to dental procedure       enoxaparin 40 MG/0.4ML injection   Commonly known as:  LOVENOX   Used for:  Closed fracture of left hip, initial encounter (H)        Dose:  40 mg   Inject 0.4 mLs (40 mg) Subcutaneous every 24 hours   Refills:  0        nystatin 353860 UNIT/ML suspension   Commonly known as:  MYCOSTATIN   Used for:  Thrush        Dose:  988063 Units   Swish and swallow 1 mL (100,000 Units) in mouth 4 times daily   Quantity:  280 mL   Refills:  0        oxyCODONE IR 5 MG tablet   Commonly known as:  ROXICODONE   Used for:  Closed fracture of left hip, initial encounter (H)        Dose:  5 mg   Take 1 tablet (5 mg) by mouth every 4 hours as needed for moderate to severe pain   Quantity:  30 tablet   Refills:  0        polyethylene glycol Packet   Commonly known as:  MIRALAX/GLYCOLAX   Used for:  Constipation, unspecified constipation type        Dose:  17 g   Take 17 g by mouth 2 times daily   Quantity:  7 packet   Refills:  0        senna-docusate 8.6-50 MG per tablet   Commonly known as:  SENOKOT-S;PERICOLACE   Used for:  Closed fracture of left hip, initial encounter (H)        Dose:  2 tablet   Take 2 tablets by mouth 2 times daily   Quantity:  40 tablet   Refills:  0          CONTINUE these medications which may have CHANGED, or have new prescriptions. If we are uncertain of the size of tablets/capsules you have at home, strength may be listed as something that might have changed.        Dose / Directions Comments    aspirin 325 MG EC tablet   This may have changed:    - medication strength  -  how much to take   Used for:  NSTEMI (non-ST elevated myocardial infarction) (H)        Dose:  325 mg   Take 1 tablet (325 mg) by mouth daily   Quantity:  30 tablet   Refills:  0          CONTINUE these medications which have NOT CHANGED        Dose / Directions Comments    ACCU-CHEK SMARTVIEW test strip   Used for:  Type 2 diabetes mellitus with diabetic nephropathy (H)   Generic drug:  blood glucose monitoring        USE 1 STRIP BY IN VITRO ROUTE 2 TIMES DAILY OR AS DIRECTED   Quantity:  200 strip   Refills:  1        albuterol 108 (90 Base) MCG/ACT inhaler   Commonly known as:  PROAIR HFA/PROVENTIL HFA/VENTOLIN HFA   Used for:  COPD (chronic obstructive pulmonary disease) (H)        Dose:  2 puff   Inhale 2 puffs into the lungs every 4 hours as needed for shortness of breath / dyspnea or wheezing   Quantity:  1 Inhaler   Refills:  5        fluticasone 50 MCG/ACT spray   Commonly known as:  FLONASE        Dose:  1-2 spray   Spray 1-2 sprays into both nostrils daily as needed for rhinitis or allergies   Refills:  0        FUROSEMIDE PO        Dose:  20 mg   Take 20 mg by mouth daily   Refills:  0        GLUCOTROL PO        Dose:  5 mg   Take 5 mg by mouth every morning (before breakfast)   Refills:  0        guaiFENesin 600 MG 12 hr tablet   Commonly known as:  MUCINEX   Used for:  Pneumonia due to infectious organism, unspecified laterality, unspecified part of lung        Dose:  600 mg   Take 1 tablet (600 mg) by mouth 2 times daily   Quantity:  28 tablet   Refills:  0        hydrOXYzine 25 MG capsule   Commonly known as:  VISTARIL   Used for:  Itching        Dose:  25 mg   Take 1 capsule (25 mg) by mouth 2 times daily as needed for itching   Quantity:  180 capsule   Refills:  1        Lidocaine 4 % Patch   Commonly known as:  LIDOCARE        Dose:  3 patch   Place 3 patches onto the skin daily as needed for moderate pain   Refills:  0        metoclopramide 5 MG tablet   Commonly known as:  REGLAN        Dose:  5  mg   Take 1 tablet (5 mg) by mouth 3 times daily as needed   Quantity:  20 tablet   Refills:  0        metoprolol tartrate 25 MG tablet   Commonly known as:  LOPRESSOR   Used for:  NSTEMI (non-ST elevated myocardial infarction) (H)        Dose:  25 mg   Take 1 tablet (25 mg) by mouth 2 times daily   Quantity:  180 tablet   Refills:  3    Profile Rx: patient will contact pharmacy when needed       ondansetron 4 MG ODT tab   Commonly known as:  ZOFRAN ODT   Used for:  Nausea        Dose:  4 mg   Take 1 tablet (4 mg) by mouth every 6 hours as needed for nausea   Quantity:  30 tablet   Refills:  1        pregabalin 75 MG capsule   Commonly known as:  LYRICA   Used for:  Neuropathy        Dose:  75 mg   Take 1 capsule (75 mg) by mouth 2 times daily   Quantity:  60 capsule   Refills:  3        ranitidine 150 MG tablet   Commonly known as:  ZANTAC   Used for:  Dyspepsia        TAKE ONE TABLET BY MOUTH TWO TIMES A DAY   Quantity:  180 tablet   Refills:  3        simvastatin 10 MG tablet   Commonly known as:  ZOCOR   Used for:  Type 2 diabetes mellitus with diabetic nephropathy, without long-term current use of insulin (H)        TAKE 1 TABLET (10 MG) BY MOUTH AT BEDTIME   Quantity:  90 tablet   Refills:  2        tiotropium 18 MCG capsule   Commonly known as:  SPIRIVA   Used for:  Chronic obstructive pulmonary disease, unspecified COPD type (H)        Dose:  18 mcg   Inhale 1 capsule (18 mcg) into the lungs every evening   Quantity:  30 capsule   Refills:  11          STOP taking     apixaban ANTICOAGULANT 2.5 MG tablet   Commonly known as:  ELIQUIS           diazepam 10 mg Supp           HYDROcodone-acetaminophen 5-325 MG per tablet   Commonly known as:  NORCO           lisinopril 10 MG tablet   Commonly known as:  PRINIVIL/ZESTRIL           magnesium hydroxide 400 MG/5ML suspension   Commonly known as:  MILK OF MAGNESIA           traMADol 50 MG tablet   Commonly known as:  ULTRAM                   Summary of Visit      Reason for your hospital stay       Right bipolar hemiarthroplasty for femoral neck fracture             After Care     Activity - Up with assistive device           Advance Diet as Tolerated       Follow this diet upon discharge: Orders Placed This Encounter      Room Service      Moderate Consistent CHO Diet       Encourage PO fluids           Fall precautions           General info for SNF       Length of Stay Estimate: Short Term Care: Estimated # of Days <30  Condition at Discharge: Improving  Level of care:skilled   Rehabilitation Potential: Good  Admission H&P remains valid and up-to-date: Yes  Recent Chemotherapy: N/A  Use Nursing Home Standing Orders: Yes       Hip precautions           Mantoux instructions       Give two-step Mantoux (PPD) Per Facility Policy Yes       Weight bearing status       WBAT       Wound care       Site:   Right hip  Instructions:  Leave dressing intact if Aquacel and remove at POD #7, remove staples POD #14  Daily dressing changes gauze and tape             Procedures     Oxygen - Nasal cannula       2 Lpm by nasal cannula to keep O2 sats 92% or greater.  Wean as tolerated.             Referrals     Occupational Therapy Adult Consult       Evaluate and treat as clinically indicated.    Reason:  Right bipolar hemiarthroplasty for femoral neck fracture       Physical Therapy Adult Consult       Evaluate and treat as clinically indicated.    Reason:  Right bipolar hemiarthroplasty for femoral neck fracture             Supplies     AntiEmbolism Stockings       Bilateral below knee length.On in the morning, off at night             Your next 10 appointments already scheduled     Nov 09, 2018  1:45 PM CST   [place] with STONE TECH1   [facility] ([facility])    [address] [place] [id number]   [phone] OPT 2            Nov 19, 2018  1:30 PM CST   New Visit with Hector Moran MD   [facility]  ([facility])    [address] [id number] [phone]              Follow-Up Appointment Instructions     Future Labs/Procedures    Follow Up and recommended labs and tests     Comments:    Follow up with Dr. Fish in 2 weeks.  No follow up labs or test are needed. Call [facility] for appointment [phone]      Follow-Up Appointment Instructions     Follow Up and recommended labs and tests       Follow up with Dr. Fish in 2 weeks.  No follow up labs or test are needed. Call [facility] for appointment [phone]             Statement of Approval     Ordered          10/26/18 1040  I have reviewed and agree with all the recommendations and orders detailed in this document.  EFFECTIVE NOW     Approved and electronically signed by:  Ervin Lares MD

## 2018-10-19 NOTE — IP AVS SNAPSHOT
` ` Patient Information     Patient Name Sex     Helene Gibbs ([id number]) Female 1935       Room Bed    5294 5517-01      Patient Demographics     Address Phone    [address] [place] [address] [phone] (Home) *Preferred*  none (Work)  none (Mobile)      Patient Ethnicity & Race     Ethnic Group Patient Race    American White      Emergency Contact(s)     Name Relation Home Work Mobile    Enedelia Kamara (niece) Relative [phone]    Roma Gaston Friend [phone]      Tara Chandler  Relative   [phone]      Documents on File        Status Date Received Description       Documents for the Patient    Consent Form  09     Face Sheet Received () 09     External Medication Information Consent Accepted () 09     Face Sheet Received () 09     Face Sheet Received () 06/22/10     External Medication Information Consent Accepted () 06/22/10     Consent for Services - Hospital/Clinic Received () 11/02/10     HIM ELSY Authorization   3/22/11 ELSY [facility]    Consent for Services - Hospital/Clinic Received () 11     HIM ELSY Authorization Received 11     HIM ELSY Authorization Received 11     External Medication Information Consent Accepted () 11     Consent to Communicate Received () 10/29/11     Consent for Services - Hospital/Clinic Received () 12     External Medication Information Consent Accepted () 12     Physical Therapy Certification Received () 10/01/12     Advance Directives and Living Will Received 10/02/12 POLST 12    Business/Insurance/Care Coordination/Health Form - Patient.1 Received 10/04/12     HIM ELSY Authorization  10/17/12     HIM ELSY Authorization  12     Consent for Services - Hospital/Clinic Received () 13     [facility] Specified Other       External Medication Information Consent Accepted  07/10/13     Consent to Communicate Received 13     Consent for Services - Hospital/Clinic Received () 14     Consent for Services - Hospital/Clinic  () 14     Physical Therapy Certification Received 14 Obs eval and treat    Consent for Services - Hospital/Clinic Received () 14     Consent for Services - Hospital/Clinic       Consent for Services - Geriatrics Received 14     Privacy Notice - [place] Received 02/17/15     Occupational Therapy Certification Received 03/04/15 Edema eval    Consent for Services - Hospital/Clinic Received () 07/16/15     Business/Insurance/Care Coordination/Health Form - Patient  11/11/15 BLUEALMAS FEP APPROVED [place]OV[place]IK 9/6/15-16    Business/Insurance/Care Coordination/Health Form - Patient  11/30/15 CANCELLATION AND LATE ARRIVAL POLICY    Patient ID Received 12/14/15 [place]    Consent for Services/Privacy Notice - Hospital/Clinic Received () 16     Consent for EHR Access Received 16     Consent for Services - [facility]MP       Consent for Services/Privacy Notice - Hospital/Clinic-[place] Received () 17     Insurance Card Received () 17 BCBS Federal    Advance Directives and Living Will Not Received  Validation of AD 10/14/16    Advance Directives and Living Will Received 17 Health Care Directive 10/14/16    HIM ELSY Authorization  17     Consent to Communicate Received 17 auth to discuss phi    Care Everywhere Prospective Auth Received 10/31/17     Insurance Card Received () 17 Medicare Part A & B    Insurance Card Received 18 Medicare A/B    Consent for Services/Privacy Notice - Hospital/Clinic Received 18     Insurance Card Received 18 BCBS FED 2018    Consent for Services - Hospital and Clinic Received 18     HIE Auth Received 18     Privacy Notice - [place] Received 18     Business/Insurance/Care  Coordination/Health Form - Patient  05/09/18 ADULT REHABILITATION ATTENDANCE POLICY 4/25/18    Consent to Communicate  08/13/18 AUTHORIZATION TO DISCUSS PROTECTED  HEALTH INFORMATION 8/8/18    HIM ELSY Authorization  09/14/18     Insurance Card  (Deleted) 02/11/09     Privacy Notice - [place] Received (Deleted) 02/11/09     Patient ID Received (Deleted) 12/19/12     Insurance Card Received (Deleted) 02/27/12     Occupational Therapy Certification Received (Deleted) 10/22/12     Insurance Card Received (Deleted) 12/24/12     Insurance Card Received (Deleted) 12/24/12     Consent for EHR Access  (Deleted) 03/13/13 Copied from existing Consent for services - C/HOD collected on 03/01/2013    Patient ID Received (Deleted) 12/01/13     Insurance Card Received (Deleted) 03/27/14     Insurance Card Received (Deleted) 07/11/14     Patient ID Received (Deleted) 07/11/14     Insurance Card  (Deleted)      Consent for Services - Geriatrics Received (Deleted) 07/28/14     Insurance Card Received (Deleted) 02/17/15 BCBS    Consent for Services/Privacy Notice - Hospital/Clinic  (Deleted)      Insurance Card Received (Deleted) 01/26/16     Insurance Card Received (Deleted) 01/26/16     Patient ID  (Deleted)      Consent for Services/Privacy Notice - Hospital/Clinic Received (Deleted) 02/14/17        Documents for the Encounter    EMS/Ambulance Record  10/19/18 [facility]    CMS IM for Patient Signature Received 10/19/18     CE Point of Care Auth Received        Admission Information     Attending Provider Admitting Provider Admission Type Admission Date/Time    Reyes, Imer Huynh MD Reyes, Imer Huynh MD Emergency 10/19/18  2121    Discharge Date Hospital Service Auth/Cert Status Service Area     Hospitalist Incomplete [facility]    Unit Room/Bed Admission Status        55 ORTHO SPEC UNIT 5517/5517-01 Admission (Confirmed)       Admission     Complaint    Leg fracture, left      Hospital  Account     Name Acct ID Class Status Primary Coverage    Helene Gibbs [id number] Inpatient Open MEDICARE - MEDICARE            Guarantor Account (for Hospital Account #[id number])     Name Relation to Pt Service Area Active? Acct Type    Helene Gibbs  FCS Yes Personal/Family    Address Phone          [address] [place] [address] [phone](H)  none(O)              Coverage Information (for Hospital Account #[id number])     1. MEDICARE/MEDICARE     F/O Payor/Plan Precert #    MEDICARE/MEDICARE     Subscriber Subscriber #    Helene Gibbs [id number]    Address Phone    [employer]  [address]  [place] [address] [phone]          2. BCBS/BCBS [employer] EMPLOYEE PROGRAM     F/O Payor/Plan Precert #    BCBS/BCBS FEDERAL EMPLOYEE PROGRAM     Subscriber Subscriber #    Helene Gibbs [id number]    Address Phone    [address] [address]  [place], [place] [id number] [phone]

## 2018-10-19 NOTE — IP AVS SNAPSHOT
` `     [facility]: [phone]                 INTERAGENCY TRANSFER FORM - NOTES (H&P, Discharge Summary, Consults, Procedures, Therapies)   10/19/2018                    Hospital Admission Date: 10/19/2018  HELENE BOBO   : 1935  Sex: Female        Patient PCP Information     Provider PCP Type    Neisha Esparza MD General         History & Physicals      H&P by mIer Reyes MD at 10/19/2018 11:04 PM     Author:  Imer Reyes MD Service:  Hospitalist Author Type:  Physician    Filed:  10/20/2018  7:52 AM Date of Service:  10/19/2018 11:04 PM Creation Time:  10/19/2018 11:04 PM    Status:  Signed :  Imer Reyes MD (Physician)         [facility]    History and Physical  Hospitalist       Date of Admission:  10/19/2018[JW1.1]    Assessment & Plan[JW1.2]   Helene Bobo is a 83 year old female who presents with[JW1.1] a mechanical fall at home found to have a right femoral neck fracture.  Generally feeling unwell for the past week with no clear localizing symptoms.  Recently underwent root canal of left lower bicuspid.  On clindamycin per recommendation of outpatient OMFS[JW1.3]    Right femoral neck fracture: Patient with mechanical fall while setting up her pills this evening. Chronically anticoagulated for permanent atrial fibrillation status post AV alexandra ablation and permanent pacemaker placement.  Patient is class III revised cardiac risk for coronary artery disease including most recent abnormal stress nuclear medicine study in May 2018 as well as history of congestive heart failure with preserved ejection fraction.  6.6% risk of perioperative myocardial event.  Note also with multiple metabolic abnormalities including ALT and AST elevation of unclear etiology, acute renal injury, acute on chronic anemia.  These features, of course, would likely increase perioperative risk.  -Orthopedic surgery consulted[JW1.4].    Erasmo discussed with orthopedic surgery at time of admission.[JW1.3]  -[JW1.4]Apixaban remains on hold.  Last dose to 10/19/18 a.m.  -Continuing clindamycin  -Remains on 81 mg daily aspirin[JW1.3]    Peripheral vascular disease with left femoral artery stent  Coronary artery disease: Patient with a troponin elevation as well as abnormal stress imaging 5/7/18.  Note small area of apical ischemia of moderate intensity at that time.  Decision was for ongoing medical management.  Increases perioperative risk.  -Continue prior to admission 81 mg daily aspirin  -Holding prior to admission apixaban  -Continue prior to admission metoprolol XL  -Prior to admission lisinopril on hold given acute kidney injury.    Atrial fibrillation, permanent[JW1.4]:[JW1.1] Status post pacemaker following AV node ablation.[JW1.4]  -[JW1.1]Prior to admission apixaban 2.5 mg twice daily held.  Anticipate operative intervention on hip fracture.    -Continue prior to admission metoprolol XL    Dental abscess: Patient with recent root canal of left lower bicuspid, right upper[JW1.4] second molar fractured.  Still[JW1.3]  -Continue clindamycin, converted to IV (continuation was recommended by outpatient OMFS)     Left lobe atelectasis versus pneumonia: Patient with generalized weakness and feeling unwell.  Chronic cough which is not significantly changed.  Reportedly had some mild hypoxia in the emergency department which subsequently resolved with IV fluid administration. No fevers or chills.  -Procalcitonin added on  -continue clindamycin.      Acute kidney injury: Patient with a creatinine of 1.8 from baseline of 1.04 well.  Suspect prerenal, though no recent medication changes and patient without reports of intake change.   -Prior to admission lisinopril 10 mg daily held, prior to admission furosemide 20 mg daily held  -Trend BMP    Acute on chronic blood loss anemia: Patient with a history of melenic stool, none recently.  Note hemoglobin  of 9.3 today as compared to 14.1 8/9/18.  Over the past year has undergone colonoscopy as well as EGD.  Polyps on colonoscopy in October 2017, EGD unremarkable other than some antral gastritis.  Some concern that ongoing anemia related to GI blood loss in the setting of chronic antico regulation.  -Resume prior to admission  -Prior to admission apixaban held PPI/H2 therapy when reconciled  -monitor CBC as well as for evidence of fecal blood loss.    Type 2 diabetes:  -Prior to admission glipizide 5 mg daily held  -Medium dose sliding scale insulin while n.p.o.    Hepatitis: Patient with ALT and AST elevated to the 300 range.  No clear etiology for this.  Patient has been feeling generally unwell for the past few days.[JW1.4]  With acute creatinine elevation, possibly related to volume depletion and possible hypoperfusion.  No abdominal pain.  -Follow CMP    History of heart failure preserved ejection fraction: Last TTE in March 2018 with an ejection fraction of 50-55%, severe biatrial enlargement, 2+ tricuspid regurgitation with mild pulmonary hypertension.  -Prior to admission Lasix held  -Prior to admission lisinopril held  -Continue prior to admission metoprolol     COPD:  -Scheduled duo nebs  -PRN albuterol nebulizer treatments  -Holding prior to admission Spiriva while on scheduled DuoNebs[JW1.3]    DVT Prophylaxis:[JW1.1] Pneumatic compression devices[JW1.3]    Code Status:[JW1.1] DNR/DNI.  Confirmed with patient on admission.[JW1.3]    Disposition: Expected discharge in[JW1.1] likely 3-4 days pending operative intervention and subsequent recovery.  Monitor closely for fevers or localizing infectious symptoms.[JW1.3]    Imer Huynh [place]    Primary Care Physician   Neisha Esparza    Chief Complaint[JW1.2]   Right leg pain[JW1.3]    History is obtained from the patient, chart review[JW1.1], discussion with Dr. Zimmerman in the emergency department.[JW1.3]      History of Present Illness[JW1.2]   Helene BURGESS  Sai is a 83 year old female who presents with[JW1.1] A mechanical fall.  Patient states that she was at her kitchen counter this evening setting up her evening medications when she felt as though her legs gave out underneath her.  Somewhat describes shifting her feet at time of fall and blames her previous slippers that she was wearing, feeling that they did not  at time of movement.  Did not lose consciousness, fell backward into the side hitting her head on the refrigerator prior to twisting and falling to the ground.  Immediate right leg pain. No radiation. Worse with movement, some improvement with pain medications in the emergency department.  Patient presented where she was found to have a right femoral neck fracture.     Patient describes feeling generally unwell/somewhat unwell over the past week.  She is not able to describe exactly what is felt unwell, though she feels generally under the weather.  Describes some fatigue.  Over the past few days, she has had several dentistry appointments including for a teeth cleaning and subsequent follow-up for root canal.  Has been on periprocedural clindamycin, and states that she was recommended to continue taking clindamycin for dental infection by her dentist following root canal.    Patient has a cough with some sputum production, though states that this is her baseline cough in the setting of prior smoking history.    Patient has had several hospitalizations in the past year.  Has struggled with ongoing suspected GI blood loss anemia in the setting of anticoagulation from atrial fibrillation.  Underwent colonoscopy in October 2017 with polyps removed, EGD more recently without ulcerations, some antral erythema.  Has not noted any clinic stool or bleeding, though her hemoglobin is in the 9 range from previously 14 range.    Patient has not had any recent chest discomfort.  When I last admitted patient in May 2018 she was experiencing chest pain, had a  troponin elevation, and subsequently underwent stress imaging which was abnormal and consistent with a small area of ischemia.  Decision at that time in discussion with cardiology was for ongoing medical management.     No nausea or vomiting, no abdominal pain.  Has not had any recent medication changes other than she held her apixaban for dental procedures over the past days, resumed as of 10/19 morning.  Did not take her Eliquis 10/19 evening as she fell prior to taking her medications.[JW1.3]    Past Medical History[JW1.2]    I have reviewed this patient's medical history and updated it with pertinent information if needed.[JW1.1]   Past Medical History:   Diagnosis Date     Anemia      Ankle arthritis      Arthritis      Back pain      Bell's palsy 9/08    left     Breast CA (H) 2004-    left lumpectomy, radiation, -recurrence     CKD (chronic kidney disease)     stage 3 baseline Cr 1.3     Colon polyps     colonoscopy-9/12-next due in 9/13     Congestive heart failure (H)      COPD (chronic obstructive pulmonary disease) (H)      Coronary artery disease      DM (diabetes mellitus) (H)      GERD (gastroesophageal reflux disease)      Hyperlipidemia      Hypertension      Lumbar spinal stenosis     use cane     Nicotine dependence      Obesity      Osteoporosis      Other chronic pain      Pacemaker      Permanent atrial fibrillation (H) 8/2008    S/P AV node ablation and pacemaker 10-25-12       Pleural effusion      Pulmonary hypertension (H)      PVD (peripheral vascular disease) (H)      Rectal stenosis      Tobacco abuse     current     Tricuspid regurgitation         Past Surgical History[JW1.2]   I have reviewed this patient's surgical history and updated it with pertinent information if needed.[JW1.1]  Past Surgical History:   Procedure Laterality Date     ARTHROSCOPY SHOULDER RT/LT  1999, 2004    Bilateral, right then left     BONE MARROW BIOPSY, BONE SPECIMEN, NEEDLE/TROCAR N/A 8/29/2017    Procedure:  BIOPSY BONE MARROW;  BONE MARROW BIOPSY;  Surgeon: Marino Cohen MD;  Location:  GI     C DEXA, BONE DENSITY, AXIAL SKEL       C MAMMOGRAM, SCREENING  1/2009     COLONOSCOPY N/A 10/7/2016    Procedure: COMBINED COLONOSCOPY, SINGLE OR MULTIPLE BIOPSY/POLYPECTOMY BY BIOPSY;  Surgeon: Cassandra Mccord MD;  Location:  GI     ESOPHAGOSCOPY, GASTROSCOPY, DUODENOSCOPY (EGD), COMBINED N/A 8/10/2017    Procedure: COMBINED ESOPHAGOSCOPY, GASTROSCOPY, DUODENOSCOPY (EGD), BIOPSY SINGLE OR MULTIPLE;  gastroscopy;  Surgeon: Helene Bustamante MD;  Location:  GI     H ABLATION AV NODE  10/2012     HC COLONOSCOPY THRU STOMA, DIAGNOSTIC  ? 2005     IMPLANT PACEMAKER  10/2012    [place] [id number], [id number]     JOINT REPLACEMTN, KNEE RT/LT      Joint Replacement knee bilateral     LAPAROSCOPIC CHOLECYSTECTOMY WITH CHOLANGIOGRAMS N/A 12/14/2015    Procedure: LAPAROSCOPIC CHOLECYSTECTOMY WITH CHOLANGIOGRAMS;  Surgeon: Ervin Amos MD;  Location:  OR     LUMPECTOMY BREAST      Left-2004     PHACOEMULSIFICATION CLEAR CORNEA WITH STANDARD INTRAOCULAR LENS IMPLANT Left 7/16/2015    Procedure: PHACOEMULSIFICATION CLEAR CORNEA WITH STANDARD INTRAOCULAR LENS IMPLANT;  Surgeon: Sai Obregon MD;  Location:  EC     PHACOEMULSIFICATION CLEAR CORNEA WITH TORIC INTRAOCULAR LENS IMPLANT Right 5/9/2017    Procedure: PHACOEMULSIFICATION CLEAR CORNEA WITH TORIC INTRAOCULAR LENS IMPLANT;  RIGHT EYE PHACOEMULSIFICATION CLEAR CORNEA WITH TORIC INTRAOCULAR LENS IMPLANT ;  Surgeon: Duc Richmond MD;  Location:  EC       Prior to Admission Medications   Prior to Admission Medications   Prescriptions Last Dose Informant Patient Reported? Taking?   ACCU-CHEK SMARTVIEW test strip  Self No No   Sig: USE 1 STRIP BY IN VITRO ROUTE 2 TIMES DAILY OR AS DIRECTED   FUROSEMIDE PO  Self Yes No   Sig: Take 20 mg by mouth daily   GlipiZIDE (GLUCOTROL PO)  Self Yes No   Sig: Take 5 mg by mouth every morning (before breakfast)    HYDROcodone-acetaminophen (NORCO) 5-325 MG per tablet   No No   Sig: Take 1 tablet by mouth every 6 hours as needed for pain   albuterol (PROAIR HFA, PROVENTIL HFA, VENTOLIN HFA) 108 (90 BASE) MCG/ACT inhaler  Self No No   Sig: Inhale 2 puffs into the lungs every 4 hours as needed for shortness of breath / dyspnea or wheezing   apixaban ANTICOAGULANT (ELIQUIS) 2.5 MG tablet  Self No No   Sig: Take 1 tablet (2.5 mg) by mouth 2 times daily   aspirin EC 81 MG EC tablet  Self No No   Sig: Take 1 tablet (81 mg) by mouth daily   diazepam 10 mg SUPP   No No   Sig: Insert one suppository vaginally at bedtime as needed   fluticasone (FLONASE) 50 MCG/ACT spray   No No   Sig: INHALE 1 TO 2 SPRAYS INTO BOTH NOSTRILS DAILY   guaiFENesin (MUCINEX) 600 MG 12 hr tablet   No No   Sig: Take 1 tablet (600 mg) by mouth 2 times daily   hydrOXYzine (VISTARIL) 25 MG capsule  Self No No   Sig: Take 1 capsule (25 mg) by mouth 2 times daily as needed for itching   lidocaine (LIDODERM) 5 % Patch   No No   Sig: Apply up to 3 patches to painful area at once for up to 12 h within a 24 h period.  Remove after 12 hours.   lisinopril (PRINIVIL/ZESTRIL) 10 MG tablet  Self No No   Sig: Take 1 tablet (10 mg) by mouth 2 times daily   magnesium hydroxide (MILK OF MAGNESIA) 400 MG/5ML suspension  Self Yes No   Sig: Take 30 mLs by mouth At Bedtime    metoclopramide (REGLAN) 5 MG tablet  Self No No   Sig: Take 1 tablet (5 mg) by mouth 3 times daily as needed   metoprolol tartrate (LOPRESSOR) 25 MG tablet  Self No No   Sig: Take 1 tablet (25 mg) by mouth 2 times daily   ondansetron (ZOFRAN ODT) 4 MG ODT tab  Self No No   Sig: Take 1 tablet (4 mg) by mouth every 6 hours as needed for nausea   pregabalin (LYRICA) 75 MG capsule   No No   Sig: Take 1 capsule (75 mg) by mouth 2 times daily   ranitidine (ZANTAC) 150 MG tablet  Self No No   Sig: TAKE ONE TABLET BY MOUTH TWO TIMES A DAY   simvastatin (ZOCOR) 10 MG tablet  Self No No   Sig: TAKE 1 TABLET (10 MG)  BY MOUTH AT BEDTIME   tiotropium (SPIRIVA) 18 MCG capsule  Self No No   Sig: Inhale 1 capsule (18 mcg) into the lungs every evening   traMADol (ULTRAM) 50 MG tablet   No No   Sig: Take 1 tablet (50 mg) by mouth every 6 hours as needed for severe pain Do not take hydrocodone with this      Facility-Administered Medications: None     Allergies   Allergies   Allergen Reactions     Ambien [Zolpidem Tartrate] Visual Disturbance     Hallucinations and fell out of bed at night.     Penicillins Hives     Definity      Caused a syncopal episode.     Sulfa Drugs Itching     Cymbalta Rash     Fluconazole Rash       Social History[JW1.2]   I have reviewed this patient's social history and updated it with pertinent information if needed. Helene Gibbs[JW1.1]  reports that she has quit smoking. Her smoking use included Cigarettes. She has a 11.25 pack-year smoking history. She has never used smokeless tobacco. She reports that she does not drink alcohol or use illicit drugs.     Family History[JW1.2]   I have reviewed this patient's family history and updated it with pertinent information if needed.[JW1.1]   Family History   Problem Relation Age of Onset     Hypertension Mother      Diabetes Mother       at 83 yrs     C.A.D. Father       age 70s     Breast Cancer No family hx of      Colon Cancer No family hx of        Review of Systems[JW1.2]   The 10 point Review of Systems is negative other than noted in the HPI or here.[JW1.1]   No fevers, no rigors  Pain isolated right lower extremity  No abdominal pain  No nausea or vomiting[JW1.3]    Physical Exam   Temp: 97.6  F (36.4  C) Temp src: Oral BP: 125/57 Pulse: 73   Resp: 16 SpO2: 92 % O2 Device: Nasal cannula Oxygen Delivery: 2 LPM[JW1.2]  Vital Signs with Ranges[JW1.1]  Temp:  [97.6  F (36.4  C)] 97.6  F (36.4  C)  Pulse:  [65-73] 73  Resp:  [16-18] 16  BP: (125)/(57) 125/57  SpO2:  [84 %-98 %] 92 %  160 lbs 0 oz[JW1.2]    Constitutional: no acute distress, alert,  conversant[JW1.1].  Appears generally ill and somewhat fatigued.[JW1.3]  Eyes: no scleral icterus or injection  HEENT: moist mucous membranes  Respiratory: breath sounds clear bilaterally to auscultation, no wheezes, no crackles[JW1.1].  Poor air movement throughout lung[JW1.3]  Cardiovascular: regular rate and rhythm, no murmur[JW1.1].  Implanted cardiac device in L chest wall.[JW1.3]  GI: abdomen soft, non-tender, normoactive bowel sounds, no masses  Lymph/Hematologic:[JW1.1] 1-2+ bilateral lower extremity edema, pitting.[JW1.3]  Genitourinary: not examined  Musculoskeletal: muscular tone intact in all extremities[JW1.1].  Right lower extremity foreshortened and rotated[JW1.3]  Neurologic: mental status grossly intact, no focal deficits, alert[JW1.1].  Mild left facial droop, known history of Bell's palsy.[JW1.3]  Psychiatric:[JW1.1] Blunted affect[JW1.3]    Data[JW1.2]   Data reviewed today:  I personally reviewed[JW1.1] no images or EKG's today[JW1.3].[JW1.1]    Recent Labs  Lab 10/19/18  2156   WBC 9.7   HGB 9.3*   MCV 93      INR 1.54*      POTASSIUM 4.4   CHLORIDE 96   CO2 30   BUN 43*   CR 1.80*   ANIONGAP 9   GERARD 8.6   *   ALBUMIN 3.1*   PROTTOTAL 6.8   BILITOTAL 0.7   ALKPHOS 98   *   *       Recent Results (from the past 24 hour(s))   Head CT w/o contrast    Narrative    CT SCAN OF THE HEAD WITHOUT CONTRAST   10/19/2018 10:12 PM     HISTORY: fall, hit head on eliquis;     TECHNIQUE:  Axial images of the head and coronal reformations without  IV contrast material. Radiation dose for this scan was reduced using  automated exposure control, adjustment of the mA and/or kV according  to patient size, or iterative reconstruction technique.    COMPARISON: None.    FINDINGS:  There is diffuse parenchymal volume loss.  White matter  changes are present in the cerebral hemispheres that are consistent  with small vessel ischemic disease in this age patient. There is no  evidence of  intracranial hemorrhage, mass, acute infarct or anomaly.  The visualized portions of the sinuses and mastoids appear normal.  There is no evidence of trauma.      Impression    IMPRESSION: No intracranial hemorrhage or skull fractures are  identified.      JOHNATHAN LOWRY MD   XR Pelvis w Hip Left 1 View    Narrative    PELVIS WITH LEFT HIP LATERAL TWO VIEWS  10/19/2018 10:31 PM     HISTORY:  Pain.     COMPARISON: 7/18/2018.      Impression    IMPRESSION:  There is a displaced and angulated acute appearing  fracture of the left femoral neck. No other areas suspicious for acute  fracture are identified. Diffuse osteopenia. Arterial calcification.  Left femoral artery stent. Moderate amount of gas and stool within  loops of colon are noted in the pelvis.    BARAK IZQUIERDO MD   XR Chest 1 View    Narrative    CHEST ONE VIEW  10/19/2018 10:33 PM     HISTORY:  Pain.     COMPARISON: 7/21/2018.      Impression    IMPRESSION: Dual-lead cardiac device left chest wall, with lead tips  unchanged in position. There is likely a small left pleural effusion,  as well as mild associated infiltrate and/or atelectasis at the left  lung base laterally. The lungs are otherwise clear. Heart size appears  stable. Pulmonary vascularity is within normal limits. Aortic  calcification.    BARAK IZQUIERDO MD[JW1.2]        Revision History        User Key Date/Time User Provider Type Action    > JW1.3 10/20/2018  7:52 AM Imer Reyes MD Physician Sign     JW1.4 10/20/2018  6:59 AM Imer Reyes MD Physician      JW1.2 10/19/2018 11:05 PM Imer Reyes MD Physician      JW1.1 10/19/2018 11:04 PM Imer Reyes MD Physician                      Discharge Summaries      Discharge Summaries by Ervin Lares MD at 10/26/2018 10:40 AM     Author:  Ervin Lares MD Service:  Hospitalist Author Type:  Physician    Filed:  10/26/2018 10:57 AM Date of Service:  10/26/2018 10:40 AM Creation Time:  10/26/2018 10:40 AM    Status:   Signed :  Ervin Lares MD (Physician)         [facility]    Discharge Summary  Hospitalist    Date of Admission:  10/19/2018  Date of Discharge:  10/26/2018  Discharging Provider: Ervin Lares  Date of Service (when I saw the patient): 10/26/18    Discharge Diagnoses   Right femoral neck fracture s/p hemiarthroplasty  Acute hypoxic respiratory failure  Bilateral pneumonia  Acute on chronic blood loss anemia  Hypovolemic hypotension  Oliguric RICK on CKD stage III  Liver transaminitis  PVD  CAD  Chronic diastolic CHF  A-fib s/p ablation and PPM  Recent dental abscess  DM II  Chronic back pain  COPD  Oropharyngeal and esophageal thrush  Constipation     History of Present Illness   Helene Gibbs is an 83 year old female who presented with fall with right femoral neck fracture s/p hemiarthroplasty.  Hospital course complicated by RICK due to hypovolemia/blood loss in addition to hypoxic respiratory failure due to pneumonia and probable component volume overload.    Hospital Course   Helene Gibbs was admitted on 10/19/2018.  The following problems were addressed during her hospitalization:    Right femoral neck fracture s/p right hemiarthroplasty 10/20:   Follow up with Orthopedic surgery as recommended.     Bilateral bacterial PNA, organism unspecified  Acute hypoxic respiratory failure  Admission CXR with questionable lower lobe infiltrate vs atelectasis.  No leukocytosis and afebrile but has productive with sputum growing E.coli and repeat CXR 10/25 with bilateral patchy infiltrates.  Also with mild oxygen requirements, though suspect degree of volume overload and will be resuming lasix at discharge.  Will discharge on course of ceftin.  Wean oxygen as tolerated.      Acute on chronic blood loss anemia  Hypovolemic hypotension: resolved  Patient with a history of melenic stool, none recently with hgb wnl as recently as 8/9/18.  (Has had EGD and colonoscopy in the past year.  Some concern  that ongoing anemia related to GI blood loss in the setting of chronic anticoagulation.)  Admission hgb 9.3 fell to 7.7 POD1 with associated hypotension s/p 1 unit PRBC on 10/21/18 and 10/22.  Hgb now stable about 10 g/dL.  Continue PTA ranitidine.      Oliguric acute kidney injury on CKD stage III  Baseline Cr 1.1, admission Cr 1.8 with peak of 3.24 on 10/22.  Likely combination of pre-renal status in setting of acute blood loss and hypotension.  Notably, required pressor support intra-operatively and intermittently hypotensive post-op.  UA 10/22 with mild albuminuria, otherwise unremarkable.  Cr returned to baseline with IVF.     Liver transaminitis  AST and ALT elevated at admission.  Suspect hypoperfusion.  No abdominal pain.  LFT's trending down.      Peripheral vascular disease with left femoral artery stent  Coronary artery disease  Chronic diastolic CHF  Atrial fibrillation s/p AVN ablation with PPM, permanent on anticoagulation   Last TTE in March 2018 with an ejection fraction of 50-55%, severe biatrial enlargement, 2+ tricuspid regurgitation with mild pulmonary hypertension.  Patient with a troponin elevation as well as abnormal stress imaging 5/7/18 (small area of apical ischemia of moderate intensity) medically managed.  Will continue on PTA metoprolol and resume lasix at discharge.  Will continue to hold lisinopril 10 mg daily given recent RICK, resume as indicated.  Continue PTA aspirin.  Will hold PTA apixaban 2.5 mg bid until after dental procedure next week.      Recent root canal:   Patient with recent root canal of left lower bicuspid 10/18 and completed 1 week course of clindamycin 10/25.  Requires extraction of tooth #3 which will be scheduled for next week (should take clindamycin prior to procedure per dentist).      Type 2 diabetes:  A1C 5.5% this admission.  Managed on glipizide alone which will be continued.     Chronic back pain  Resume PTA Lyrica (initially held due to  RICK).      COPD  Continue Incruse-Ellipta (sub for Spiriva), albuterol neb qid     Oropharyngeal and esophageal thrush:  Complaining of oral pain and odynophagia 10/24 with oral plaques noted.  Continue nystatin swish and swallow x7 days.     Constipation  Continue bid sennakot and milk of mag scottn      Ervin Lares    Significant Results and Procedures   Right hip hemiarthroplasty.    Pending Results   These results will be followed up by: Hospitalist service  Unresulted Labs Ordered in the Past 30 Days of this Admission     Date and Time Order Name Status Description    10/26/2018 0659 Magnesium In process           Code Status   DNR / DNI       Primary Care Physician   Neisha Esparza    Constitutional: well developed, well nourished female in no acute distress  Respiratory: mild bibasilar crackles, no crackles or tachypnea  Cardiovascular: regular rate and rhythm, normal S1/S2, no murmur, rubs or gallops  GI: abdomen soft, non-tender, non-distended, normal bowel sounds  Lymph/Hematologic: 1+ peripheral edema of upper and lower extremities  Skin: No rash or bruising  Musculoskeletal: Extremities warm and well perfused  Neurologic: alert and appropriate, cranial nerves grossly intact  Psychiatric: normal affect    Discharge Disposition   Discharged to nursing home  Condition at discharge: Stable    Consultations This Hospital Stay   PHYSICAL THERAPY ADULT IP CONSULT  OCCUPATIONAL THERAPY ADULT IP CONSULT  ORTHOPEDIC SURGERY IP CONSULT  OCCUPATIONAL THERAPY ADULT IP CONSULT  PHYSICAL THERAPY ADULT IP CONSULT  CARE TRANSITION RN/SW IP CONSULT  NEPHROLOGY IP CONSULT  PHYSICAL THERAPY ADULT IP CONSULT  OCCUPATIONAL THERAPY ADULT IP CONSULT    Time Spent on this Encounter   IErvin, personally saw the patient today and spent greater than 30 minutes discharging this patient.    Discharge Orders     General info for SNF   Length of Stay Estimate: Short Term Care: Estimated # of Days <30  Condition at Discharge:  Improving  Level of care:skilled   Rehabilitation Potential: Good  Admission H&P remains valid and up-to-date: Yes  Recent Chemotherapy: N/A  Use Nursing Home Standing Orders: Yes     Mantoux instructions   Give two-step Mantoux (PPD) Per Facility Policy Yes     Reason for your hospital stay   Right bipolar hemiarthroplasty for femoral neck fracture     Wound care   Site:   Right hip  Instructions:  Leave dressing intact if Aquacel and remove at POD #7, remove staples POD #14  Daily dressing changes gauze and tape     Follow Up and recommended labs and tests   Follow up with Dr. Fish in 2 weeks.  No follow up labs or test are needed. Call [facility] for appointment [phone]     Activity - Up with assistive device     Weight bearing status   WBAT     Encourage PO fluids     Physical Therapy Adult Consult   Evaluate and treat as clinically indicated.    Reason:  Right bipolar hemiarthroplasty for femoral neck fracture     Occupational Therapy Adult Consult   Evaluate and treat as clinically indicated.    Reason:  Right bipolar hemiarthroplasty for femoral neck fracture     Fall precautions     Hip precautions     Advance Diet as Tolerated   Follow this diet upon discharge: Orders Placed This Encounter     Room Service     Moderate Consistent CHO Diet       Discharge Medications   Current Discharge Medication List      START taking these medications    Details   acetaminophen (TYLENOL) 325 MG tablet Take 2 tablets (650 mg) by mouth every 6 hours as needed for mild pain  Qty: 40 tablet, Refills: 0    Associated Diagnoses: Closed fracture of left hip, initial encounter (H)      cefuroxime (CEFTIN) 500 MG tablet Take 1 tablet (500 mg) by mouth every 12 hours for 7 doses  Refills: 0    Associated Diagnoses: Community acquired bacterial pneumonia      enoxaparin (LOVENOX) 40 MG/0.4ML injection Inject 0.4 mLs (40 mg) Subcutaneous every 24 hours    Associated Diagnoses: Closed fracture of left hip, initial encounter (H)       nystatin (MYCOSTATIN) 702471 UNIT/ML suspension Swish and swallow 1 mL (100,000 Units) in mouth 4 times daily  Qty: 280 mL    Associated Diagnoses: Thrush      oxyCODONE IR (ROXICODONE) 5 MG tablet Take 1 tablet (5 mg) by mouth every 4 hours as needed for moderate to severe pain  Qty: 30 tablet, Refills: 0    Associated Diagnoses: Closed fracture of left hip, initial encounter (H)      senna-docusate (SENOKOT-S;PERICOLACE) 8.6-50 MG per tablet Take 2 tablets by mouth 2 times daily  Qty: 40 tablet, Refills: 0    Associated Diagnoses: Closed fracture of left hip, initial encounter (H)         CONTINUE these medications which have NOT CHANGED    Details   albuterol (PROAIR HFA, PROVENTIL HFA, VENTOLIN HFA) 108 (90 BASE) MCG/ACT inhaler Inhale 2 puffs into the lungs every 4 hours as needed for shortness of breath / dyspnea or wheezing  Qty: 1 Inhaler, Refills: 5    Associated Diagnoses: COPD (chronic obstructive pulmonary disease) (H)      aspirin EC 81 MG EC tablet Take 1 tablet (81 mg) by mouth daily    Associated Diagnoses: NSTEMI (non-ST elevated myocardial infarction) (H)      fluticasone (FLONASE) 50 MCG/ACT spray Spray 1-2 sprays into both nostrils daily as needed for rhinitis or allergies      FUROSEMIDE PO Take 20 mg by mouth daily      GlipiZIDE (GLUCOTROL PO) Take 5 mg by mouth every morning (before breakfast)      guaiFENesin (MUCINEX) 600 MG 12 hr tablet Take 1 tablet (600 mg) by mouth 2 times daily  Qty: 28 tablet, Refills: 0    Associated Diagnoses: Pneumonia due to infectious organism, unspecified laterality, unspecified part of lung      hydrOXYzine (VISTARIL) 25 MG capsule Take 1 capsule (25 mg) by mouth 2 times daily as needed for itching  Qty: 180 capsule, Refills: 1    Associated Diagnoses: Itching      Lidocaine (LIDOCARE) 4 % Patch Place 3 patches onto the skin daily as needed for moderate pain      magnesium hydroxide (MILK OF MAGNESIA) 400 MG/5ML suspension Take 30 mLs by mouth At Bedtime        metoclopramide (REGLAN) 5 MG tablet Take 1 tablet (5 mg) by mouth 3 times daily as needed  Qty: 20 tablet, Refills: 0      metoprolol tartrate (LOPRESSOR) 25 MG tablet Take 1 tablet (25 mg) by mouth 2 times daily  Qty: 180 tablet, Refills: 3    Comments: Profile Rx: patient will contact pharmacy when needed  Associated Diagnoses: NSTEMI (non-ST elevated myocardial infarction) (H)      ondansetron (ZOFRAN ODT) 4 MG ODT tab Take 1 tablet (4 mg) by mouth every 6 hours as needed for nausea  Qty: 30 tablet, Refills: 1    Associated Diagnoses: Nausea      pregabalin (LYRICA) 75 MG capsule Take 1 capsule (75 mg) by mouth 2 times daily  Qty: 60 capsule, Refills: 3    Associated Diagnoses: Neuropathy      ranitidine (ZANTAC) 150 MG tablet TAKE ONE TABLET BY MOUTH TWO TIMES A DAY  Qty: 180 tablet, Refills: 3    Associated Diagnoses: Dyspepsia      simvastatin (ZOCOR) 10 MG tablet TAKE 1 TABLET (10 MG) BY MOUTH AT BEDTIME  Qty: 90 tablet, Refills: 2    Associated Diagnoses: Type 2 diabetes mellitus with diabetic nephropathy, without long-term current use of insulin (H)      tiotropium (SPIRIVA) 18 MCG capsule Inhale 1 capsule (18 mcg) into the lungs every evening  Qty: 30 capsule, Refills: 11    Associated Diagnoses: Chronic obstructive pulmonary disease, unspecified COPD type (H)      ACCU-CHEK SMARTVIEW test strip USE 1 STRIP BY IN VITRO ROUTE 2 TIMES DAILY OR AS DIRECTED  Qty: 200 strip, Refills: 1    Associated Diagnoses: Type 2 diabetes mellitus with diabetic nephropathy (H)         STOP taking these medications       apixaban ANTICOAGULANT (ELIQUIS) 2.5 MG tablet Comments:   Reason for Stopping:         diazepam 10 mg SUPP Comments:   Reason for Stopping:         HYDROcodone-acetaminophen (NORCO) 5-325 MG per tablet Comments:   Reason for Stopping:         lisinopril (PRINIVIL/ZESTRIL) 10 MG tablet Comments:   Reason for Stopping:         traMADol (ULTRAM) 50 MG tablet Comments:   Reason for Stopping:              Allergies   Allergies   Allergen Reactions     Ambien [Zolpidem Tartrate] Visual Disturbance     Hallucinations and fell out of bed at night.     Penicillins Hives     Definity      Caused a syncopal episode.     Sulfa Drugs Itching     Cymbalta Rash     Fluconazole Rash     Data   Most Recent 3 CBC's:[PB1.1]  Recent Labs   Lab Test  10/26/18   0659  10/25/18   0657  10/24/18   0731   10/22/18   0711   10/21/18   0657   WBC   --   9.1   --    --   10.5   --   11.6*   HGB  10.4*  11.0*  10.2*   < >  8.9*   < >  7.8*   MCV   --   91   --    --   91   --   92   PLT  203  172  184   --   195   --   217    < > = values in this interval not displayed.[PB1.2]      Most Recent 3 BMP's:[PB1.1]  Recent Labs   Lab Test  10/26/18   0659  10/25/18   0657  10/24/18   0731   NA  139  138  137   POTASSIUM  5.3  4.8  5.0   CHLORIDE  111*  107  105   CO2  24 25 26   BUN  32*  43*  53*   CR  1.16*  1.69*  2.39*   ANIONGAP  4  6  6   GERARD  8.5  8.2*  8.4*   GLC  99  115*  121*[PB1.2]     Most Recent 2 LFT's:[PB1.1]  Recent Labs   Lab Test  10/25/18   0657  10/23/18   0722   AST  47*  110*   ALT  140*  229*   ALKPHOS  94  105   BILITOTAL  0.6  0.8[PB1.2]     Most Recent INR's and Anticoagulation Dosing History:  Anticoagulation Dose History     Recent Dosing and Labs Latest Ref Rng & Units 10/15/2015 12/3/2015 12/8/2015 8/9/2017 1/4/2018 1/5/2018 10/19/2018    INR 0.86 - 1.14 1.19(H) 1.21(H) 1.21(H) 1.41(H) 0.97 1.00 1.54(H)    INR 0.86 - 1.14 - - - - - - -    INR Point of Care 0.86 - 1.14 - - - - - - -        Most Recent 6 Bacteria Isolates From Any Culture (See EPIC Reports for Culture Details):[PB1.1]  Recent Labs   Lab Test  10/23/18   0911  10/22/18   2300  10/19/18   2300  07/16/18   1740  04/16/18   1352  04/06/18   0820   CULT  Light growth  Escherichia coli  *  Light growth  Strain 2  Escherichia coli  *  Plus  Light growth  Normal pal    No growth  <10,000 colonies/mL  Escherichia coli  *  No growth  >100,000  colonies/mL  Escherichia coli  *  10,000 to 50,000 colonies/mL  mixed urogenital pal[PB1.2]       Most Recent TSH, T4 and A1c Labs:[PB1.1]  Recent Labs   Lab Test  10/19/18   2156   04/25/17   1445   TSH   --    --   1.56   A1C  5.5   < >  6.4*    < > = values in this interval not displayed.     Results for orders placed or performed during the hospital encounter of 10/19/18   XR Pelvis w Hip Left 1 View    Narrative    PELVIS WITH LEFT HIP LATERAL TWO VIEWS  10/19/2018 10:31 PM     HISTORY:  Pain.     COMPARISON: 7/18/2018.      Impression    IMPRESSION:  There is a displaced and angulated acute appearing  fracture of the left femoral neck. No other areas suspicious for acute  fracture are identified. Diffuse osteopenia. Arterial calcification.  Left femoral artery stent. Moderate amount of gas and stool within  loops of colon are noted in the pelvis.    BARAK IZQUIERDO MD   Head CT w/o contrast    Narrative    CT SCAN OF THE HEAD WITHOUT CONTRAST   10/19/2018 10:12 PM     HISTORY: fall, hit head on eliquis;     TECHNIQUE:  Axial images of the head and coronal reformations without  IV contrast material. Radiation dose for this scan was reduced using  automated exposure control, adjustment of the mA and/or kV according  to patient size, or iterative reconstruction technique.    COMPARISON: None.    FINDINGS:  There is diffuse parenchymal volume loss.  White matter  changes are present in the cerebral hemispheres that are consistent  with small vessel ischemic disease in this age patient. There is no  evidence of intracranial hemorrhage, mass, acute infarct or anomaly.  The visualized portions of the sinuses and mastoids appear normal.  There is no evidence of trauma.      Impression    IMPRESSION: No intracranial hemorrhage or skull fractures are  identified.      JOHNATHAN LOWRY MD   XR Chest 1 View    Narrative    CHEST ONE VIEW  10/19/2018 10:33 PM     HISTORY:  Pain.     COMPARISON: 7/21/2018.      Impression     IMPRESSION: Dual-lead cardiac device left chest wall, with lead tips  unchanged in position. There is likely a small left pleural effusion,  as well as mild associated infiltrate and/or atelectasis at the left  lung base laterally. The lungs are otherwise clear. Heart size appears  stable. Pulmonary vascularity is within normal limits. Aortic  calcification.    BARAK IZQUIERDO MD   XR Pelvis w Hip Port Left 1 View    Narrative    PELVIS AND HIP PORTABLE LEFT ONE VIEW 10/20/2018 12:00 PM     HISTORY: Postoperative hip arthroplasty.       Impression    IMPRESSION: Left hip hemiarthroplasty without apparent complication.  Postoperative soft tissue gas is noted. Left femoral artery stent is  noted. Skin staples are in place.    GREGORY RAYMUNDO MD   XR Chest 2 Views    Narrative    CHEST TWO VIEWS 10/25/2018 6:09 PM     HISTORY:  Assess for infiltrate.      COMPARISON: October 19, 2018.     FINDINGS: Patchy bilateral infiltrates. Stable cardiac device. The  cardiac silhouette is prominent but stable. Pulmonary vasculature is  unremarkable.      Impression    IMPRESSION: Patchy bilateral infiltrates.      MABLE VIZCARRA MD     *Note: Due to a large number of results and/or encounters for the requested time period, some results have not been displayed. A complete set of results can be found in Results Review.[PB1.2]          Revision History        User Key Date/Time User Provider Type Action    > PB1.2 10/26/2018 10:57 AM Ervin Lares MD Physician Sign     PB1.1 10/26/2018 10:40 AM Ervin Lares MD Physician                      Consult Notes      Consults by Ирина Thomas BSW at 10/23/2018  3:11 PM     Author:  Ирина Thomas BSW Service:  Social Work Author Type:      Filed:  10/23/2018  3:11 PM Date of Service:  10/23/2018  3:11 PM Creation Time:  10/23/2018  3:04 PM    Status:  Signed :  Ирина Thomas BSW ()     Consult Orders:    1. Care Transition RN/SW IP Consult  [[id number]] ordered by Ervin Lares MD at 10/22/18 0952                Care Transition Initial Assessment -   Reason For Consult: discharge planning  Met with: Patient[HH1.1]   Active Problems:    Leg fracture, left[HH1.2]       DATA  Lives With: alone  Living Arrangements: apartment    Identified issues/concerns regarding health management: discharge planning    Transportation Available: family or friend will provide    ASSESSMENT  Cognitive Status:  awake and alert  Concerns to be addressed:     Per SW consult for discharge planning. Pt admitted 10/20 with a left leg fracture. Pt will tentatively discharge 10/25. Reviewed medical record. Pt was previously living in a senior apartment with bathing and housekeeping services. Pt's senior apartment can provide assisted living services if needed. Met with pt to discuss discharge planning. Pt is a BPCI pt and has already picked [facility] for TCU. SW sent referral via [facility] to [facility]. SW will follow[facility] with pt [facility] [place] SW will also arrange for transportation once discharge date is known.     PLAN  Financial costs for the patient includes: n/a   Patient given options and choices for discharge: BPCI pt-already chose [facility]  Patient/family is agreeable to the plan?  Yes  Patient Goals and Preferences: [facility]  Patient anticipates discharging to: [facility] TCU     Will continue to follow and support a safe discharge plan    Ирина SAM, JARAD[HH1.1]          Revision History        User Key Date/Time User Provider Type Action    > HH1.2 10/23/2018  3:11 PM Ирина Thomas BSW  Sign     HH1.1 10/23/2018  3:04 PM Ирина Thomas BSW              Consults by Ish Fish MD at 10/20/2018  7:27 AM     Author:  Ish Fish MD Service:  Orthopedics Author Type:  Physician    Filed:  10/20/2018  7:29 AM Date of Service:  10/20/2018  7:27 AM Creation Time:  10/20/2018  7:27 AM    Status:  Signed :  Ish Fish  MD (Physician)     Consult Orders:    1. Orthopedic Surgery IP Consult: Patient to be seen: Routine - within 24 hours; hip fx; Consultant may enter orders: Yes [[id number]] ordered by Eric, Imer Huynh MD at 10/20/18 0129                [facility] Orthopedic Consultation    Helene Gibbs MRN# [id number]   Age: 83 year old YOB: 1935     Date of Admission:  10/19/2018    Reason for consult: Femoral neck fracture       Requesting physician: Reyes       Level of consult: Consult, follow and place orders           Assessment and Plan:   Assessment:   Femoral neck fracture        Plan:   Left hip hemiarthroplasty           Chief Complaint:   Femoral neck fracture         History of Present Illness:   This patient is a 83 year old female who presents with the following condition requiring a hospital admission:    Hip fracture:    She presents with history of a fall at home resulting in inability to walk and groin pain on the left. This occurred yesterday. She lost her balance and fell onto her left hip. She also hit her head. Unable to bear weight afterwards. Brought to ED and found to have a displaced femoral neck fracture. Admitted for medical optimization and ortho consult. She is on eloquist, last dose yesterday morning. She uses a walker for ambulation. No other injuries. She is currently on clinda for a dental abscess.             Past Medical History:     Past Medical History:   Diagnosis Date     Anemia      Ankle arthritis      Arthritis      Back pain      Bell's palsy 9/08    left     Breast CA (H) 2004-    left lumpectomy, radiation, -recurrence     CKD (chronic kidney disease)     stage 3 baseline Cr 1.3     Colon polyps     colonoscopy-9/12-next due in 9/13     Congestive heart failure (H)      COPD (chronic obstructive pulmonary disease) (H)      Coronary artery disease      DM (diabetes mellitus) (H)      GERD (gastroesophageal reflux disease)      Hyperlipidemia      Hypertension       Lumbar spinal stenosis     use cane     Nicotine dependence      Obesity      Osteoporosis      Other chronic pain      Pacemaker      Permanent atrial fibrillation (H) 8/2008    S/P AV node ablation and pacemaker 10-25-12       Pleural effusion      Pulmonary hypertension (H)      PVD (peripheral vascular disease) (H)      Rectal stenosis      Tobacco abuse     current     Tricuspid regurgitation              Past Surgical History:     Past Surgical History:   Procedure Laterality Date     ARTHROSCOPY SHOULDER RT/LT  1999, 2004    Bilateral, right then left     BONE MARROW BIOPSY, BONE SPECIMEN, NEEDLE/TROCAR N/A 8/29/2017    Procedure: BIOPSY BONE MARROW;  BONE MARROW BIOPSY;  Surgeon: Marino Cohen MD;  Location:  GI     C DEXA, BONE DENSITY, AXIAL SKEL       C MAMMOGRAM, SCREENING  1/2009     COLONOSCOPY N/A 10/7/2016    Procedure: COMBINED COLONOSCOPY, SINGLE OR MULTIPLE BIOPSY/POLYPECTOMY BY BIOPSY;  Surgeon: Cassandra Mccord MD;  Location:  GI     ESOPHAGOSCOPY, GASTROSCOPY, DUODENOSCOPY (EGD), COMBINED N/A 8/10/2017    Procedure: COMBINED ESOPHAGOSCOPY, GASTROSCOPY, DUODENOSCOPY (EGD), BIOPSY SINGLE OR MULTIPLE;  gastroscopy;  Surgeon: Helene Bustamante MD;  Location:  GI     H ABLATION AV NODE  10/2012     HC COLONOSCOPY THRU STOMA, DIAGNOSTIC  ? 2005     IMPLANT PACEMAKER  10/2012    [place] [id number], [id number]     JOINT REPLACEMTN, KNEE RT/LT      Joint Replacement knee bilateral     LAPAROSCOPIC CHOLECYSTECTOMY WITH CHOLANGIOGRAMS N/A 12/14/2015    Procedure: LAPAROSCOPIC CHOLECYSTECTOMY WITH CHOLANGIOGRAMS;  Surgeon: Ervin Amos MD;  Location:  OR     LUMPECTOMY BREAST      Left-2004     PHACOEMULSIFICATION CLEAR CORNEA WITH STANDARD INTRAOCULAR LENS IMPLANT Left 7/16/2015    Procedure: PHACOEMULSIFICATION CLEAR CORNEA WITH STANDARD INTRAOCULAR LENS IMPLANT;  Surgeon: Sai Obregon MD;  Location:  EC     PHACOEMULSIFICATION CLEAR CORNEA WITH TORIC INTRAOCULAR  LENS IMPLANT Right 2017    Procedure: PHACOEMULSIFICATION CLEAR CORNEA WITH TORIC INTRAOCULAR LENS IMPLANT;  RIGHT EYE PHACOEMULSIFICATION CLEAR CORNEA WITH TORIC INTRAOCULAR LENS IMPLANT ;  Surgeon: Duc Richmond MD;  Location[facility]             Social History:     Social History   Substance Use Topics     Smoking status: Former Smoker     Packs/day: 0.25     Years: 45.00     Types: Cigarettes     Smokeless tobacco: Never Used     Alcohol use No             Family History:     Family History   Problem Relation Age of Onset     Hypertension Mother      Diabetes Mother       at 83 yrs     C.A.D. Father       age 70s     Breast Cancer No family hx of      Colon Cancer No family hx of              Immunizations:     VACCINE/DOSE   Diptheria   DPT   DTAP   HBIG   Hepatitis A   Hepatitis B   HIB   Influenza   Measles   Meningococcal   MMR   Mumps   Pneumococcal   Polio   Rubella   Small Pox   TDAP   Varicella   Zoster             Allergies:     Allergies   Allergen Reactions     Ambien [Zolpidem Tartrate] Visual Disturbance     Hallucinations and fell out of bed at night.     Penicillins Hives     Definity      Caused a syncopal episode.     Sulfa Drugs Itching     Cymbalta Rash     Fluconazole Rash             Medications:     Current Facility-Administered Medications   Medication     [Auto Hold] acetaminophen (TYLENOL) tablet 650 mg     [Auto Hold] albuterol neb solution 2.5 mg     [Auto Hold] aspirin EC tablet 81 mg     [Auto Hold] bisacodyl (DULCOLAX) Suppository 10 mg     [Auto Hold] clindamycin (CLEOCIN) infusion 900 mg     clindamycin (CLEOCIN) infusion 900 mg     clindamycin (CLEOCIN) infusion 900 mg     [Auto Hold] glucose gel 15-30 g    Or     [Auto Hold] dextrose 50 % injection 25-50 mL    Or     [Auto Hold] glucagon injection 1 mg     [Auto Hold] fluticasone (FLONASE) 50 MCG/ACT spray 2 spray     [Auto Hold] HYDROmorphone (PF) (DILAUDID) injection 0.3-0.5 mg     [Auto Hold] insulin aspart  (NovoLOG) inj (RAPID ACTING)     [Auto Hold] ipratropium - albuterol 0.5 mg/2.5 mg/3 mL (DUONEB) neb solution 3 mL     lactated ringers infusion     [Auto Hold] lidocaine (LMX4) cream     [Auto Hold] lidocaine 1 % 1 mL     lidocaine 1 % 1 mL     [Auto Hold] melatonin tablet 1 mg     [Auto Hold] metoprolol tartrate (LOPRESSOR) tablet 25 mg     [Auto Hold] naloxone (NARCAN) injection 0.1-0.4 mg     [Auto Hold] ondansetron (ZOFRAN-ODT) ODT tab 4 mg    Or     [Auto Hold] ondansetron (ZOFRAN) injection 4 mg     [Auto Hold] oxyCODONE IR (ROXICODONE) tablet 5-10 mg     [Auto Hold] polyethylene glycol (MIRALAX/GLYCOLAX) Packet 17 g     [Auto Hold] pregabalin (LYRICA) capsule 75 mg     [Auto Hold] prochlorperazine (COMPAZINE) injection 5 mg    Or     [Auto Hold] prochlorperazine (COMPAZINE) tablet 5 mg    Or     [Auto Hold] prochlorperazine (COMPAZINE) Suppository 12.5 mg     [Auto Hold] senna-docusate (SENOKOT-S;PERICOLACE) 8.6-50 MG per tablet 1 tablet    Or     [Auto Hold] senna-docusate (SENOKOT-S;PERICOLACE) 8.6-50 MG per tablet 2 tablet     [Auto Hold] simvastatin (ZOCOR) tablet 10 mg     [Auto Hold] sodium chloride (PF) 0.9% PF flush 3 mL     [Auto Hold] sodium chloride (PF) 0.9% PF flush 3 mL     sodium chloride 0.9% infusion     tranexamic acid (CYKLOKAPRON) 1 g in sodium chloride 0.9 % 60 mL bolus     tranexamic acid (CYKLOKAPRON) 1 g in sodium chloride 0.9 % 60 mL bolus            Review of Systems:   All review of systems was performed and was negative except as per hpi            Physical Exam:   All vitals have been reviewed  Patient Vitals for the past 24 hrs:   BP Temp Temp src Pulse Heart Rate Resp SpO2 Height Weight   10/20/18 0700 107/64 - - - 70 14 95 % - -   10/20/18 0657 107/54 - - 70 - - - - -   10/20/18 0523 - - - - - - - - 72.6 kg (160 lb)   10/20/18 0100 144/82 98  F (36.7  C) Oral 70 - 16 96 % - -   10/20/18 0040 143/69 - - - - - - - -   10/20/18 0020 (!) 142/106 - - - - - - - -   10/20/18 0009 - -  "- - - - 98 % - -   10/20/18 0000 149/90 - - - - - 99 % - -   10/19/18 2340 159/86 - - - - - 98 % - -   10/19/18 2300 - - - 73 - - - - -   10/19/18 2259 - - - - - - 98 % - -   10/19/18 2258 138/85 - - - - - - - -   10/19/18 2203 125/57 97.6  F (36.4  C) Oral 73 - 16 92 % 1.651 m (5' 5\") 72.6 kg (160 lb)   10/19/18 2200 - - - 70 - - 98 % - -   10/19/18 2126 - - - 65 - - (!) 88 % - -   10/19/18 2125 - - - 70 - - (!) 89 % - -   10/19/18 2122 125/57 - - 69 - 18 (!) 84 % - -       Intake/Output Summary (Last 24 hours) at 10/20/18 0727  Last data filed at 10/20/18 0500   Gross per 24 hour   Intake                0 ml   Output              400 ml   Net             -400 ml     NAD  nonlabored breathing  No peripheral edema  Skin intact  White sclera  LLE:  +pain with logroll  +Df/PF/EHL  Sensation intact throughout  Skin intact            Data:   All laboratory data reviewed  Results for orders placed or performed during the hospital encounter of 10/19/18   XR Pelvis w Hip Left 1 View    Narrative    PELVIS WITH LEFT HIP LATERAL TWO VIEWS  10/19/2018 10:31 PM     HISTORY:  Pain.     COMPARISON: 7/18/2018.      Impression    IMPRESSION:  There is a displaced and angulated acute appearing  fracture of the left femoral neck. No other areas suspicious for acute  fracture are identified. Diffuse osteopenia. Arterial calcification.  Left femoral artery stent. Moderate amount of gas and stool within  loops of colon are noted in the pelvis.    BARAK IZQUIERDO MD   Head CT w/o contrast    Narrative    CT SCAN OF THE HEAD WITHOUT CONTRAST   10/19/2018 10:12 PM     HISTORY: fall, hit head on eliquis;     TECHNIQUE:  Axial images of the head and coronal reformations without  IV contrast material. Radiation dose for this scan was reduced using  automated exposure control, adjustment of the mA and/or kV according  to patient size, or iterative reconstruction technique.    COMPARISON: None.    FINDINGS:  There is diffuse parenchymal " volume loss.  White matter  changes are present in the cerebral hemispheres that are consistent  with small vessel ischemic disease in this age patient. There is no  evidence of intracranial hemorrhage, mass, acute infarct or anomaly.  The visualized portions of the sinuses and mastoids appear normal.  There is no evidence of trauma.      Impression    IMPRESSION: No intracranial hemorrhage or skull fractures are  identified.      JOHNATHAN LOWRY MD   XR Chest 1 View    Narrative    CHEST ONE VIEW  10/19/2018 10:33 PM     HISTORY:  Pain.     COMPARISON: 7/21/2018.      Impression    IMPRESSION: Dual-lead cardiac device left chest wall, with lead tips  unchanged in position. There is likely a small left pleural effusion,  as well as mild associated infiltrate and/or atelectasis at the left  lung base laterally. The lungs are otherwise clear. Heart size appears  stable. Pulmonary vascularity is within normal limits. Aortic  calcification.    BARAK IZQUIERDO MD   CBC with platelets differential   Result Value Ref Range    WBC 9.7 4.0 - 11.0 10e9/L    RBC Count 3.20 (L) 3.8 - 5.2 10e12/L    Hemoglobin 9.3 (L) 11.7 - 15.7 g/dL    Hematocrit 29.6 (L) 35.0 - 47.0 %    MCV 93 78 - 100 fl    MCH 29.1 26.5 - 33.0 pg    MCHC 31.4 (L) 31.5 - 36.5 g/dL    RDW 15.2 (H) 10.0 - 15.0 %    Platelet Count 260 150 - 450 10e9/L    Diff Method Automated Method     % Neutrophils 75.9 %    % Lymphocytes 8.1 %    % Monocytes 14.7 %    % Eosinophils 0.8 %    % Basophils 0.2 %    % Immature Granulocytes 0.3 %    Nucleated RBCs 0 0 /100    Absolute Neutrophil 7.3 1.6 - 8.3 10e9/L    Absolute Lymphocytes 0.8 0.8 - 5.3 10e9/L    Absolute Monocytes 1.4 (H) 0.0 - 1.3 10e9/L    Absolute Eosinophils 0.1 0.0 - 0.7 10e9/L    Absolute Basophils 0.0 0.0 - 0.2 10e9/L    Abs Immature Granulocytes 0.0 0 - 0.4 10e9/L    Absolute Nucleated RBC 0.0    INR   Result Value Ref Range    INR 1.54 (H) 0.86 - 1.14   Partial thromboplastin time   Result Value Ref Range     PTT 31 22 - 37 sec   Comprehensive metabolic panel   Result Value Ref Range    Sodium 135 133 - 144 mmol/L    Potassium 4.4 3.4 - 5.3 mmol/L    Chloride 96 94 - 109 mmol/L    Carbon Dioxide 30 20 - 32 mmol/L    Anion Gap 9 3 - 14 mmol/L    Glucose 178 (H) 70 - 99 mg/dL    Urea Nitrogen 43 (H) 7 - 30 mg/dL    Creatinine 1.80 (H) 0.52 - 1.04 mg/dL    GFR Estimate 27 (L) >60 mL/min/1.7m2    GFR Estimate If Black 33 (L) >60 mL/min/1.7m2    Calcium 8.6 8.5 - 10.1 mg/dL    Bilirubin Total 0.7 0.2 - 1.3 mg/dL    Albumin 3.1 (L) 3.4 - 5.0 g/dL    Protein Total 6.8 6.8 - 8.8 g/dL    Alkaline Phosphatase 98 40 - 150 U/L     (H) 0 - 50 U/L     (H) 0 - 45 U/L   UA with Microscopic reflex to Culture   Result Value Ref Range    Color Urine Yellow     Appearance Urine Slightly Cloudy     Glucose Urine Negative NEG^Negative mg/dL    Bilirubin Urine Negative NEG^Negative    Ketones Urine Negative NEG^Negative mg/dL    Specific Gravity Urine 1.012 1.003 - 1.035    Blood Urine Negative NEG^Negative    pH Urine 7.0 5.0 - 7.0 pH    Protein Albumin Urine 10 (A) NEG^Negative mg/dL    Urobilinogen mg/dL Normal 0.0 - 2.0 mg/dL    Nitrite Urine Negative NEG^Negative    Leukocyte Esterase Urine Moderate (A) NEG^Negative    Source Catheterized Urine     WBC Urine 8 (H) 0 - 5 /HPF    RBC Urine 0 0 - 2 /HPF    Squamous Epithelial /HPF Urine 13 (H) 0 - 1 /HPF    Hyaline Casts 18 (H) 0 - 2 /LPF   Hemoglobin A1c   Result Value Ref Range    Hemoglobin A1C 5.5 0 - 5.6 %   Glucose by meter   Result Value Ref Range    Glucose 141 (H) 70 - 99 mg/dL   EKG 12-lead, tracing only   Result Value Ref Range    Interpretation ECG Click View Image link to view waveform and result    ABO/Rh type and screen   Result Value Ref Range    ABO O     RH(D) Pos     Antibody Screen Neg     Test Valid Only At [facility]        Specimen Expires 10/22/2018    Urine Culture Aerobic Bacterial   Result Value Ref Range    Specimen Description  Catheterized Urine     Special Requests Specimen received in preservative     Culture Micro PENDING      *Note: Due to a large number of results and/or encounters for the requested time period, some results have not been displayed. A complete set of results can be found in Results Review.          Attestation:  Amount of time performed on this consult: 15 minutes.    Ish Fish MD[JM1.1]          Revision History        User Key Date/Time User Provider Type Action    > JM1.1 10/20/2018  7:29 AM Ish Fish MD Physician Sign                     Progress Notes - Physician (Notes from 10/23/18 through 10/26/18)      Progress Notes by Ирина Thomas BSW at 10/25/2018  4:14 PM     Author:  Ирина Thomas BSW Service:  Social Work Author Type:      Filed:  10/25/2018  4:18 PM Date of Service:  10/25/2018  4:14 PM Creation Time:  10/25/2018  4:14 PM    Status:  Signed :  Ирина Thomas BSW ()         EVAN  D: Met with pt to discuss discharge planning. Per MD pt wasn't aware of where she was going on discharge. SW reminded pt she was going to [facility] likely tomorrow. SW explained she's been keeping [facility] updated on how she's doing and that they are keeping a spot for her. Pt was very happy to hear [facility] is keeping a spot for her. Discussed transportation to [facility]. Pt thinks it would be best to set-up a ride for her as she doesn't feel family can safely transport. SW will call and set-up ride with [facility] via w/c in the morning. Pt very concerned that En[facility] knapp didn't know where she was going. SW assisted pt with calling [facility] to update on plan. Pt left [facility] VM. SW will follow-up with pt in the morning and make sure [facility] is update on plan prior to pt leaving.   P: Will continue to follow and support a safe discharge plan    Ирина Thomas MSW, LGSW[HH1.1]      Revision History        User Key Date/Time User Provider Type Action    > HH1.1 10/25/2018  4:18 PM Ирина Thomas  BSW  Sign            Progress Notes by Ervin Lares MD at 10/25/2018  2:49 PM     Author:  Ervin Lares MD Service:  Hospitalist Author Type:  Physician    Filed:  10/25/2018  3:28 PM Date of Service:  10/25/2018  2:49 PM Creation Time:  10/25/2018  2:49 PM    Status:  Signed :  Ervin Lares MD (Physician)         [facility]    Hospitalist Progress Note      Assessment & Plan   Helene Gibbs is a 83 year old female who was admitted on 10/19/2018.  Past history of CKD, CHF, COPD, DM II, HTN, A-fib s/p AVN ablation and PPM, PVD who presents with mechanical fall, left femoral neck fracture s/p right hemiarthroplasty.          Right femoral neck fracture s/p right hemiarthroplasty 10/20:   - management per the orthopedic service[PB1.1]    Possible PNA  Admission CXR with questionable lower lobe infiltrate vs atelectasis.  No leukocytosis and afebrile but has productive with sputum now growing E.coli.  - repeat CXR  - start ceftriaxone to cover for possible PNA, would plan for short course  - follow up sputum sensitivities[PB1.2]     Acute on chronic blood loss anemia  Hypovolemic hypotension[PB1.1]: resolved[PB1.2]  Patient with a history of melenic stool, none recently with hgb wnl as recently as 8/9/18.  Admission hgb 9.3 fell to 7.7 POD1 with associated hypotension s/p 1 unit PRBC on 10/21/18 and 10/22.  Has had EGD and colonoscopy in the past year.  Some concern that ongoing anemia related to GI blood loss in the setting of chronic anticoagulation.  - hgb stabilizing 10 g/dL  - continue PTA ranitidine     Oliguric acute kidney injury on CKD stage III  Baseline Cr 1.1, admission Cr 1.8 and trending up.  Likely combination of pre-renal status in setting of acute blood loss and hypotension.  Notably, required pressor support intra-operatively and intermittently hypotensive post-op.  No nephrotoxic meds (gentamicin on MAR was intra-op washing) or contrast exposure.  UA 10/22  with mild albuminuria, otherwise unremarkable.  - Cr trending down at[PB1.1] 1.69[PB1.2] on 10/2[PB1.1]5[PB1.2]  - decrease IVF to[PB1.1] 50[PB1.2] ml/hr  - daily BMP    Liver transaminitis  AST and ALT elevated at admission.[PB1.1]  Suspect[PB1.2] hypoperfusion.  No abdominal pain.  - AST[PB1.1]/ALT trending down[PB1.2]     Peripheral vascular disease with left femoral artery stent  Coronary artery disease  Chronic diastolic CHF  Atrial fibrillation s/p AVN ablation with PPM, permanent on anticoagulation   Last TTE in March 2018 with an ejection fraction of 50-55%, severe biatrial enlargement, 2+ tricuspid regurgitation with mild pulmonary hypertension.  Patient with a troponin elevation as well as abnormal stress imaging 5/7/18 (small area of apical ischemia of moderate intensity) medically managed.    - hold PTA lasix and lisinopril due to RICK  -[PB1.1] mildly hypertensive, will resume metoprolol at reduced dose of 12.5 mg bid[PB1.2]  - resume[PB1.1]d[PB1.2] aspirin 10/23      Dental abscess:   Patient with recent root canal of left lower bicuspid, right upper second molar fractured.[PB1.1]  - discussed with Dr. Collins 10/25, reports #3 tooth needs to be extracted with plan to do this once medically cleared; no need to continue clindamycin (will need to take a dose prior to #3 tooth extraction in next 1-2 weeks)[PB1.2]     Type 2 diabetes:  A1C 5.5% this admission.  Managed on glipizide alone.  - hold PTA glipizide  - continue med sliding scale insulin    Chronic back pain  - Lyrica stopped due to RICK      COPD  - continue Incruse-Ellipta (sub for Spiriva), albuterol neb qid    oropharyngeal thrush:  Complaining or oral pain 10/24 with oral plaques noted.  - start nystatin swish and spit 10/24    Constipation  - sennako[PB1.1]t[PB1.2] 2 tabs bid, miralax bid, daily dulcolax supp        DVT Prophylaxis: subcutaneous heparin  Code Status: DNR/DNI    Disposition: Expected discharge tomorrow pending further  improvement in labs.    Ervin Lares    Interval History[PB1.1]   Ongoing constipation.  Denies dyspnea but continues with productive cough, no fever/chills.  Pain is controlled.  No other complaints.[PB1.2]    -Data reviewed today: I reviewed all new labs and imaging results over the last 24 hours. I personally reviewed no images or EKG's today.    Physical Exam   Temp: 97.9  F (36.6  C) Temp src: Oral BP: 147/73 Pulse: 69 Heart Rate: 69 Resp: 16 SpO2: 93 % O2 Device: Nasal cannula Oxygen Delivery: 2 LPM  Vitals:    10/21/18 0621 10/23/18 0625 10/25/18 0705   Weight: 74.4 kg (164 lb) 76.6 kg (168 lb 12.8 oz) 95.1 kg (209 lb 11.2 oz)     Vital Signs with Ranges  Temp:  [97.8  F (36.6  C)-99  F (37.2  C)] 97.9  F (36.6  C)  Pulse:  [69] 69  Heart Rate:  [69-71] 69  Resp:  [16] 16  BP: (114-147)/(57-77) 147/73  SpO2:  [88 %-100 %] 93 %  I/O last 3 completed shifts:  In: 4133 [P.O.:940; I.V.:3193]  Out: 1200 [Urine:1200]     Constitutional: Well developed, obese female in no acute distress, sitting up in bed  Respiratory: bibasilar crackles, no wheezing or tachypnea  Cardiovascular: regular rate and rhythm, normal S1/S2 with grade 2/6 systolic murmur  GI: abdomen soft, normal bowel sounds  Lymph:  1+ edema of bilateral lower extremities  Other:  alert and appropriate, cranial nerves grossly intact    Medications     sodium chloride 100 mL/hr at 10/25/18 1414       albuterol  2.5 mg Nebulization 4x daily     apixaban ANTICOAGULANT  2.5 mg Oral BID     aspirin  81 mg Oral Daily     bisacodyl  10 mg Rectal Daily     clindamycin  300 mg Oral TID     guaiFENesin  600 mg Oral BID     insulin aspart  1-3 Units Subcutaneous TID AC     insulin aspart  1-3 Units Subcutaneous At Bedtime     nystatin  500,000 Units Swish & Spit 4x Daily     polyethylene glycol  17 g Oral BID     ranitidine  150 mg Oral At Bedtime     senna-docusate  2 tablet Oral BID     simvastatin  10 mg Oral At Bedtime     sodium chloride (PF)  3 mL  Intracatheter Q8H     sodium chloride (PF)  3 mL Intracatheter Q8H     umeclidinium  1 puff Inhalation QPM       Data     Recent Labs  Lab 10/25/18  0657 10/24/18  0731 10/23/18  [facility] 10/23/18  0722  10/22/18  0711  10/21/18  0657  10/19/18  2156   WBC 9.1  --   --   --   --  10.5  --  11.6*  --  9.7   HGB 11.0* 10.2* 10.3*  --   --  8.9*  < > 7.8*  < > 9.3*   MCV 91  --   --   --   --  91  --  92  --  93    184  --   --   --  195  --  217  --  260   INR  --   --   --   --   --   --   --   --   --  1.54*    137 135 132*  < > 134  --  135  --  135   POTASSIUM 4.8 5.0  --  5.0  < > 5.3  --  5.0  --  4.4   CHLORIDE 107 105  --  101  < > 100  --  100  --  96   CO2 25 26  --  26  < > 26  --  28  --  30   BUN 43* 53*  --  57*  < > 52*  --  42*  --  43*   CR 1.69* 2.39* 2.94* 3.03*  < > 3.04*  --  2.11*  --  1.80*   ANIONGAP 6 6  --  5  < > 8  --  7  --  9   GERARD 8.2* 8.4*  --  7.9*  < > 8.1*  --  8.3*  --  8.6   * 121*  --  130*  < > 152*  --  125*  --  178*   ALBUMIN 2.3*  --   --  2.4*  --   --   --  2.7*  --  3.1*   PROTTOTAL 6.0*  --   --  5.7*  --   --   --  5.8*  --  6.8   BILITOTAL 0.6  --   --  0.8  --   --   --  0.7  --  0.7   ALKPHOS 94  --   --  105  --   --   --  79  --  98   *  --   --  229*  --   --   --  223*  --  255*   AST 47*  --   --  110*  --   --   --  166*  --  300*   < > = values in this interval not displayed.    No results found for this or any previous visit (from the past 24 hour(s)).[PB1.1]       Revision History        User Key Date/Time User Provider Type Action    > PB1.2 10/25/2018  3:28 PM Ervin Lares MD Physician Sign     PB1.1 10/25/2018  2:49 PM Ervin Lares MD Physician             Progress Notes by Milka Anne PA-C at 10/25/2018 12:31 PM     Author:  Milka Anne PA-C Service:  Orthopedics Author Type:  Physician Assistant    Filed:  10/25/2018 12:33 PM Date of Service:  10/25/2018 12:31 PM Creation Time:  10/25/2018 12:31 PM     Status:  Signed :  Milka Anne PA-C (Physician Assistant)         Orthopedic Surgery  Helene Gibbs  10/25/2018  Admit Date:  10/19/2018  POD # 5  S/P right hip hemiarthroplasty     Patient resting comfortably in bed.    Pain controlled.  Tolerating oral intake.    Denies nausea or vomiting.  Denies chest pain or shortness of breath.  No events overnight.      Alert and orient to person, place, and time.  Vital Sign Ranges  Temperature Temp  Av.4  F (36.9  C)  Min: 97.8  F (36.6  C)  Max: 99  F (37.2  C)   Blood pressure Systolic (24hrs), Av , Min:114 , Max:142        Diastolic (24hrs), Av, Min:57, Max:77      Pulse Pulse  Av  Min: 69  Max: 69   Respirations Resp  Av  Min: 16  Max: 16   Pulse oximetry SpO2  Av.9 %  Min: 88 %  Max: 100 %       Dressing intact w/ moderate amount of drainage.   Minimal erythema of the surrounding skin.   Bilateral calves are soft, non-tender.  Bilateral lower extremity is NVI.  Sensation intact bilateral lower extremities  5/5 motor with resisted dorsi and plantar flexion bilaterally  +Dp pulse     Labs:  Recent Labs   Lab Test  10/25/18   0657  10/24/18   0731  10/23/18   0722   POTASSIUM  4.8  5.0  5.0     Recent Labs   Lab Test  10/25/18   0657  10/24/18   0731  10/23/18   1640   HGB  11.0*  10.2*  10.3*     Recent Labs   Lab Test  10/19/18   2156  18   0800  18   1101   INR  1.54*  1.00  0.97     Recent Labs   Lab Test  10/25/18   0657  10/24/18   0731  10/22/18   0711   PLT  172  184  195       A/P  1. Plan   Continue Heparin and ASA for DVT prophylaxis.     Mobilize with PT/OT and WBAT.     Continue current pain regiment.   Change dressing.     2. Disposition   Anticipate d/c to TCU pending medical clearance.     Milka Anne PA-C[KP1.1]      Revision History        User Key Date/Time User Provider Type Action    > KP1.1 10/25/2018 12:33 PM Milka Anne PA-C Physician Assistant Sign             Progress Notes by Azul Crow PA-C at 10/24/2018  6:34 PM     Author:  Azul Crow PA-C Service:  Orthopedics Author Type:  Physician Assistant - C    Filed:  10/24/2018  6:36 PM Date of Service:  10/24/2018  6:34 PM Creation Time:  10/24/2018  6:34 PM    Status:  Signed :  Azul Crow PA-C (Physician Assistant - C)         Orthopedic Surgery  Helene Gibbs  10/24/2018  Admit Date:  10/19/2018  POD # 4  S/P Right hip hemiarthroplasty    Patient resting comfortably in bed.    Pain controlled.  Tolerating oral intake.    Denies nausea or vomiting  Denies chest pain or shortness of breath  No events overnight.     Alert and orient to person, place, and time.  Vital Sign Ranges  Temperature Temp  Av.5  F (36.9  C)  Min: 98  F (36.7  C)  Max: 98.9  F (37.2  C)   Blood pressure Systolic (24hrs), Av , Min:117 , Max:142        Diastolic (24hrs), Av, Min:62, Max:77      Pulse Pulse  Av.5  Min: 69  Max: 70   Respirations Resp  Av.2  Min: 16  Max: 18   Pulse oximetry SpO2  Av.9 %  Min: 92 %  Max: 100 %       Dressing is clean, dry, and intact.   Minimal erythema of the surrounding skin.   Bilateral calves are soft, non-tender.  Bilateral lower extremity is NVI.  Sensation intact bilateral lower extremities  5/5 motor with resisted dorsi and plantar flexion bilaterally  +Dp pulse    Labs:  Recent Labs   Lab Test  10/24/18   0731  10/23/18   0722  10/22/18   1634   POTASSIUM  5.0  5.0  5.5*     Recent Labs   Lab Test  10/24/18   0731  10/23/18   1640  10/22/18   0711   HGB  10.2*  10.3*  8.9*     Recent Labs   Lab Test  10/19/18   2156  18   0800  18   1101   INR  1.54*  1.00  0.97     Recent Labs   Lab Test  10/24/18   0731  10/22/18   0711  10/21/18   0657   PLT  184  195  217       A/P  1. Plan   Continue Heparin and ASA for DVT prophylaxis.     Mobilize with PT/OT    WBAT.     Continue current pain regiment.   Leave dressing intact    2.  Disposition   Anticipate d/c to TCU based on medical clearance.    Azul Crow PA-C[EC1.1]     Revision History        User Key Date/Time User Provider Type Action    > EC1.1 10/24/2018  6:36 PM Azul Crow PA-C Physician Assistant - MARIA T Sign            Progress Notes by Ervin Lares MD at 10/24/2018  3:10 PM     Author:  Ervin Lares MD Service:  Hospitalist Author Type:  Physician    Filed:  10/24/2018  3:33 PM Date of Service:  10/24/2018  3:10 PM Creation Time:  10/24/2018  3:10 PM    Status:  Addendum :  Ervin Lares MD (Physician)         [facility]    Hospitalist Progress Note      Assessment & Plan   Helene Gibbs is a 83 year old female who was admitted on 10/19/2018.  Past history of CKD, CHF, COPD, DM II, HTN, A-fib s/p AVN ablation and PPM, PVD who presents with mechanical fall, left femoral neck fracture s/p right hemiarthroplasty.          Right femoral neck fracture s/p right hemiarthroplasty 10/20:   - management per the orthopedic service     Acute on chronic blood loss anemia  Hypovolemic hypotension  Patient with a history of melenic stool, none recently with hgb wnl as recently as 8/9/18.  Admission hgb 9.3 fell to 7.7 POD1 with associated hypotension s/p 1 unit PRBC on 10/21/18 and 10/22.  Has had EGD and colonoscopy in the past year.  Some concern that ongoing anemia related to GI blood loss in the setting of chronic anticoagulation.  - pressures have normalized past 24 hours  - hgb stabilizing 10 g/dL  - continue PTA ranitidine     Oliguric acute kidney injury on CKD stage III  Baseline Cr 1.1, admission Cr 1.8 and trending up.  Likely combination of pre-renal status in setting of acute blood loss and hypotension.  Notably, required pressor support intra-operatively and intermittently hypotensive post-op.  No nephrotoxic meds (gentamicin on MAR was intra-op washing) or contrast exposure.  UA 10/22 with mild albuminuria, otherwise unremarkable.  - Cr  trending down at 2.3 on 10/23  - decrease IVF to 100 ml/hr  - daily BMP    Liver transaminitis  AST and ALT elevated at admission.  Question if due to hypoperfusion.  No abdominal pain.  - AST trending down, ALT stable 10/23; repeat tomorrow AM     Peripheral vascular disease with left femoral artery stent  Coronary artery disease  Chronic diastolic CHF  Atrial fibrillation s/p AVN ablation with PPM, permanent on anticoagulation   Last TTE in March 2018 with an ejection fraction of 50-55%, severe biatrial enlargement, 2+ tricuspid regurgitation with mild pulmonary hypertension.  Patient with a troponin elevation as well as abnormal stress imaging 5/7/18 (small area of apical ischemia of moderate intensity) medically managed.    - hold PTA lasix and lisinopril due to RICK  - hold PTA metoprolol as remains normotensive  - resume aspirin 10/23; likely resume apixaban tomorrow if hgb stable      Dental abscess:   Patient with recent root canal of left lower bicuspid, right upper second molar fractured.  - Continue clindamycin (continuation was recommended by outpatient OMFS)      Type 2 diabetes:  A1C 5.5% this admission.  Managed on glipizide alone.  - hold PTA glipizide  - continue med sliding scale insulin    Chronic back pain  - Lyrica stopped due to RICK      COPD  - continue Incruse-Ellipta (sub for Spiriva), albuterol neb qid[PB1.1]    oropharyngeal thrush:  Complaining or oral pain 10/24 with oral plaques noted.  - start nystatin swish and spit 10/24[PB1.2]    Constipation  - increase sennakot to 2 tabs bid, increase miralax to bid, start daily dulcolax supp  - give tap water enema this evening if no results with supp[PB1.3]      DVT Prophylaxis: subcutaneous heparin  Code Status: DNR/DNI    Disposition: Expected discharge 2 days pending further improvement in renal function, stable hgb.    Ervin Lares    Interval History[PB1.1]   Complains of constipation with associated poor appetite and nausea.  No dyspnea.   Reports oral soreness.  No fever/chills or other complaints.[PB1.2]    -Data reviewed today: I reviewed all new labs and imaging results over the last 24 hours. I personally reviewed no images or EKG's today.    Physical Exam   Temp: 98.2  F (36.8  C) Temp src: Oral BP: 119/64 Pulse: 70 Heart Rate: 69 Resp: 16 SpO2: 94 % O2 Device: None (Room air) Oxygen Delivery: 2 LPM  Vitals:    10/20/18 0523 10/21/18 0621 10/23/18 0625   Weight: 72.6 kg (160 lb) 74.4 kg (164 lb) 76.6 kg (168 lb 12.8 oz)     Vital Signs with Ranges  Temp:  [97.9  F (36.6  C)-98.9  F (37.2  C)] 98.2  F (36.8  C)  Pulse:  [70-71] 70  Heart Rate:  [69-70] 69  Resp:  [16-18] 16  BP: (117-128)/(62-68) 119/64  SpO2:  [91 %-100 %] 94 %  I/O last 3 completed shifts:  In: 2716 [P.O.:240; I.V.:2476]  Out: 1425 [Urine:1425]     Constitutional: Well developed, obese female in no acute distress, sitting up in[PB1.1] bed[PB1.2]  Respiratory: Mild bibasilar crackles, no wheezing or tachypnea  Cardiovascular: regular rate and rhythm, normal S1/S2 with grade 2/6 systolic murmur  GI: abdomen soft, non-tender, non-distended, normal bowel sounds  Lymph:[PB1.1]  1+[PB1.2] edema of bilateral lower extremities  Other:  alert and appropriate, cranial nerves grossly intact    Medications     sodium chloride 100 mL/hr at 10/24/18 1100       albuterol  2.5 mg Nebulization 4x daily     clindamycin  300 mg Oral TID     guaiFENesin  600 mg Oral BID     heparin  5,000 Units Subcutaneous Q12H     insulin aspart  1-3 Units Subcutaneous TID AC     insulin aspart  1-3 Units Subcutaneous At Bedtime     polyethylene glycol  17 g Oral Daily     ranitidine  150 mg Oral At Bedtime     senna-docusate  1 tablet Oral BID    Or     senna-docusate  2 tablet Oral BID     simvastatin  10 mg Oral At Bedtime     sodium chloride (PF)  3 mL Intracatheter Q8H     sodium chloride (PF)  3 mL Intracatheter Q8H     umeclidinium  1 puff Inhalation QPM       Data     Recent Labs  Lab 10/24/18  3436  10/23/18  1640 10/23/18  0722 10/22/18  1634 10/22/18  0711  10/21/18  0657  10/19/18  2156   WBC  --   --   --   --  10.5  --  11.6*  --  9.7   HGB 10.2* 10.3*  --   --  8.9*  < > 7.8*  < > 9.3*   MCV  --   --   --   --  91  --  92  --  93     --   --   --  195  --  217  --  260   INR  --   --   --   --   --   --   --   --  1.54*    135 132* 133 134  --  135  --  135   POTASSIUM 5.0  --  5.0 5.5* 5.3  --  5.0  --  4.4   CHLORIDE 105  --  101 101 100  --  100  --  96   CO2 26  --  26 26 26  --  28  --  30   BUN 53*  --  57* 52* 52*  --  42*  --  43*   CR 2.39* 2.94* 3.03* 3.24* 3.04*  --  2.11*  --  1.80*   ANIONGAP 6  --  5 6 8  --  7  --  9   GERARD 8.4*  --  7.9* 8.3* 8.1*  --  8.3*  --  8.6   *  --  130* 167* 152*  --  125*  --  178*   ALBUMIN  --   --  2.4*  --   --   --  2.7*  --  3.1*   PROTTOTAL  --   --  5.7*  --   --   --  5.8*  --  6.8   BILITOTAL  --   --  0.8  --   --   --  0.7  --  0.7   ALKPHOS  --   --  105  --   --   --  79  --  98   ALT  --   --  229*  --   --   --  223*  --  255*   AST  --   --  110*  --   --   --  166*  --  300*   < > = values in this interval not displayed.    No results found for this or any previous visit (from the past 24 hour(s)).[PB1.1]       Revision History        User Key Date/Time User Provider Type Action    > [N/A] 10/24/2018  3:33 PM Ervin Lares MD Physician Addend     PB1.3 10/24/2018  3:32 PM Ervin Lares MD Physician Sign     PB1.2 10/24/2018  3:30 PM Ervin Lares MD Physician      PB1.1 10/24/2018  3:10 PM Ervin Lares MD Physician             Progress Notes by Crystal Mendoza at 10/24/2018  1:42 PM     Author:  Crystal Mendoza Service:  Spiritual Health Author Type:      Filed:  10/24/2018  1:44 PM Date of Service:  10/24/2018  1:42 PM Creation Time:  10/24/2018  1:42 PM    Status:  Signed :  Crystal Mendoza ()         SPIRITUAL HE[facility] SERVICES Progress Note  [facility]    Brief visit with pt, per her extended length  of hospitalization.  She was concerned with contacting her niece, who is her main support person.  I assisted her making this phone call.  No SH needs identified at present, but SH team is available if needs arise.                                                                                                                                                 Crystal Mendoza M.A.  Staff   Pager [phone]  Phone [phone][PL1.1]         Revision History        User Key Date/Time User Provider Type Action    > PL1.1 10/24/2018  1:44 PM Crystal Mendoza Sign            Progress Notes by Jason Rojas RN at 10/24/2018  3:31 AM     Author:  Jason Rojas RN Service:  (none) Author Type:  Registered Nurse    Filed:  10/24/2018  6:27 AM Date of Service:  10/24/2018  3:31 AM Creation Time:  10/24/2018  3:31 AM    Status:  Addendum :  Jason Rojas RN (Registered Nurse)         MD Notification    Notified Person: MD    Notified Person Name: Dr. Lancaster    Notification Date/Time: 10/24/18 0330    Notification Interaction: Pager/phone    Purpose of Notification: Patient has not had bowel movement for 5 days, we have given suppository with no results and patient is quite uncomfortable. Requesting an enema     Orders Received:[ZL1.1] Order for tap water enema placed- given[ZL1.2]     Comments:[ZL1.1]     Revision History        User Key Date/Time User Provider Type Action    > ZL1.2 10/24/2018  6:27 AM Jason Rojas RN Registered Nurse Addend     ZL1.1 10/24/2018  3:35 AM Jason Rojas RN Registered Nurse Sign            Progress Notes by Ervin Lares MD at 10/23/2018 10:14 AM     Author:  Ervin Lares MD Service:  Hospitalist Author Type:  Physician    Filed:  10/23/2018 12:05 PM Date of Service:  10/23/2018 10:14 AM Creation Time:  10/23/2018 10:14 AM    Status:  Signed :  Ervin Lares MD (Physician)         [facility]    Hospitalist Progress  Note      Assessment & Plan   Helene Gibbs is a 83 year old female who was admitted on 10/19/2018.  Past history of CKD, CHF, COPD, DM II, HTN, A-fib s/p AVN ablation and PPM, PVD who presents with mechanical fall, left femoral neck fracture s/p right hemiarthroplasty.          Right femoral neck fracture s/p right hemiarthroplasty 10/20:   - management per the orthopedic service     Acute on chronic blood loss anemia  Hypovolemic hypotension  Patient with a history of melenic stool, none recently with hgb wnl as recently as 8/9/18.  Admission hgb 9.3 fell to 7.7 POD1 with associated hypotension s/p 1 unit PRBC on 10/21/18 and 10/22.  Has had EGD and colonoscopy in the past year.  Some concern that ongoing anemia related to GI blood loss in the setting of chronic anticoagulation.  - pressures stable 's past 24 hours  - hgb 10/23 pending  - continue PTA ranitidine     Oliguric acute kidney injury on CKD stage III  Baseline Cr 1.1, admission Cr 1.8 and trending up.  Likely combination of pre-renal status in setting of acute blood loss and hypotension.  Notably, required pressor support intra-operatively and intermittently hypotensive post-op.  No nephrotoxic meds (gentamicin on MAR was intra-op washing) or contrast exposure.  UA 10/22 with mild albuminuria, otherwise unremarkable.  - Cr stable at 3 on 10/23; FENA labs yesterday consistent with pre-renal[PB1.1]  - cancel Neph consult[PB1.2]  - increase IVF to 125 ml/hr; repeat Na and Cr this afternoon  - daily BMP    Liver transaminitis  AST and ALT elevated at admission.  Question if due to hypoperfusion.  No abdominal pain.  - AST trending down, ALT stable 10/23; monitor intermittently     Peripheral vascular disease with left femoral artery stent  Coronary artery disease  Chronic diastolic CHF  Atrial fibrillation s/p AVN ablation with PPM, permanent on anticoagulation   Last TTE in March 2018 with an ejection fraction of 50-55%, severe biatrial  enlargement, 2+ tricuspid regurgitation with mild pulmonary hypertension.  Patient with a troponin elevation as well as abnormal stress imaging 5/7/18 (small area of apical ischemia of moderate intensity) medically managed.    - hold PTA lasix, aspirin and lisinopril due to RICK  - hold PTA metoprolol due to low blood pressure  - hold apixaban due to acute blood loss      Dental abscess:   Patient with recent root canal of left lower bicuspid, right upper second molar fractured.  - Continue clindamycin (continuation was recommended by outpatient OMFS)      Type 2 diabetes:  A1C 5.5% this admission.  Managed on glipizide alone.  - hold PTA glipizide  - continue med sliding scale insulin    Chronic back pain  - Lyrica stopped due to RICK      COPD  - continue Incruse-Ellipta (sub for Spiriva), albuterol neb qid      DVT Prophylaxis: cautiously start subcutaneous heparin as high risk  Code Status: DNR/DNI    Disposition: Expected discharge 2-3 days once renal function improved.    Ervin Lares    Interval History[PB1.1]   Primary complaint is constipation, reporting only milk of mag will work for her.  No dyspnea, no chest pressure, no fever/chills.  Feels stiff but denies any significant pain.  No other complaints.[PB1.2]    -Data reviewed today: I reviewed all new labs and imaging results over the last 24 hours. I personally reviewed no images or EKG's today.    Physical Exam   Temp: 98  F (36.7  C) Temp src: Oral BP: 105/55 Pulse: 70 Heart Rate: 69 Resp: 16 SpO2: 96 % O2 Device: Nasal cannula Oxygen Delivery: 2 LPM  Vitals:    10/20/18 0523 10/21/18 0621 10/23/18 0625   Weight: 72.6 kg (160 lb) 74.4 kg (164 lb) 76.6 kg (168 lb 12.8 oz)     Vital Signs with Ranges  Temp:  [97.8  F (36.6  C)-98.9  F (37.2  C)] 98  F (36.7  C)  Pulse:  [70-71] 70  Heart Rate:  [69-73] 69  Resp:  [14-16] 16  BP: ()/(39-64) 105/55  SpO2:  [91 %-100 %] 96 %  I/O last 3 completed shifts:  In: 1307.5 [P.O.:520; I.V.:487.5]  Out: 400  [Urine:400]  Constitutional: Well developed, obese female in no acute distress[PB1.1], sitting up in chair  R[PB1.2]espiratory: Mild bibasilar crackles, no wheezing or tachypnea  Cardiovascular: regular rate and rhythm, normal S1/S2 with grade 2/6 systolic murmu[PB1.1]r[PB1.2]  GI: abdomen soft, non-tender, non-distended, normal bowel sounds[PB1.1]  Lymph[PB1.2]:[PB1.1]  Trace edema of bilateral lower extremities and left upper extremity[PB1.2]  Other:[PB1.1]  alert and appropriate today[PB1.2], cranial nerves grossly intact    Medications     sodium chloride 75 mL/hr at 10/23/18 0958       acetaminophen  975 mg Oral Q8H     albuterol  2.5 mg Nebulization 4x daily     clindamycin  300 mg Oral TID     guaiFENesin  600 mg Oral BID     insulin aspart  1-3 Units Subcutaneous TID AC     insulin aspart  1-3 Units Subcutaneous At Bedtime     ranitidine  150 mg Oral At Bedtime     senna-docusate  1 tablet Oral BID    Or     senna-docusate  2 tablet Oral BID     simvastatin  10 mg Oral At Bedtime     sodium chloride (PF)  3 mL Intracatheter Q8H     sodium chloride (PF)  3 mL Intracatheter Q8H     umeclidinium  1 puff Inhalation QPM       Data     Recent Labs  Lab 10/23/18  0722 10/22/18  1634 10/22/18  0711 10/21/18  1520 10/21/18  0657  10/19/18  2156   WBC  --   --  10.5  --  11.6*  --  9.7   HGB  --   --  8.9* 9.0* 7.8*  < > 9.3*   MCV  --   --  91  --  92  --  93   PLT  --   --  195  --  217  --  260   INR  --   --   --   --   --   --  1.54*   * 133 134  --  135  --  135   POTASSIUM 5.0 5.5* 5.3  --  5.0  --  4.4   CHLORIDE 101 101 100  --  100  --  96   CO2 26 26 26  --  28  --  30   BUN 57* 52* 52*  --  42*  --  43*   CR 3.03* 3.24* 3.04*  --  2.11*  --  1.80*   ANIONGAP 5 6 8  --  7  --  9   GERARD 7.9* 8.3* 8.1*  --  8.3*  --  8.6   * 167* 152*  --  125*  --  178*   ALBUMIN 2.4*  --   --   --  2.7*  --  3.1*   PROTTOTAL 5.7*  --   --   --  5.8*  --  6.8   BILITOTAL 0.8  --   --   --  0.7  --  0.7   ALKPHOS  105  --   --   --  79  --  98   *  --   --   --  223*  --  255*   *  --   --   --  166*  --  300*   < > = values in this interval not displayed.    No results found for this or any previous visit (from the past 24 hour(s)).[PB1.1]       Revision History        User Key Date/Time User Provider Type Action    > PB1.2 10/23/2018 12:05 PM Ervin Lares MD Physician Sign     PB1.1 10/23/2018 10:14 AM Ervin Lares MD Physician             Progress Notes by Ruel Humphrey PA-C at 10/23/2018 11:10 AM     Author:  Ruel Humphrey PA-C Service:  Orthopedics Author Type:  Physician Assistant    Filed:  10/23/2018 11:11 AM Date of Service:  10/23/2018 11:10 AM Creation Time:  10/23/2018 11:10 AM    Status:  Signed :  Ruel Humphrey PA-C (Physician Assistant)         [facility] Orthopedic Progress Note  Helene Gibbs is a 83 year old female    Today's Date:10/23/2018  Admission Date: 10/19/2018  Renal insufficiency [N28.9]  Elevated LFTs [R94.5]  Dental infection [K04.7]  Closed fracture of left hip, initial encounter (H) [S72.002A]  Closed head injury, initial encounter [S09.90XA]  Chronic obstructive pulmonary disease, unspecified COPD type (H) [J44.9]  Anemia, unspecified type [D64.9]  POD # 3 Right bipolar hemiarthroplasty for femoral neck fracture.     Patient Seen.  Doing well.  Confused.  Dressings are clean, dry, and intact.  Circulation, motor and sensory function, intact distally.  BP soft.        PLAN:  Recheck HGB today  Pain control medication: No changes.  Discharge plan: when medically stable.      Temp:  [97.8  F (36.6  C)-98.9  F (37.2  C)] 98  F (36.7  C)  Pulse:  [70-71] 70  Heart Rate:  [69-73] 69  Resp:  [14-16] 16  BP: ()/(39-64) 109/55  SpO2:  [91 %-100 %] 95 %      Results for orders placed or performed during the hospital encounter of 10/19/18 (from the past 24 hour(s))   Glucose by meter   Result Value Ref Range    Glucose 159 (H) 70 - 99 mg/dL   Basic  metabolic panel   Result Value Ref Range    Sodium 133 133 - 144 mmol/L    Potassium 5.5 (H) 3.4 - 5.3 mmol/L    Chloride 101 94 - 109 mmol/L    Carbon Dioxide 26 20 - 32 mmol/L    Anion Gap 6 3 - 14 mmol/L    Glucose 167 (H) 70 - 99 mg/dL    Urea Nitrogen 52 (H) 7 - 30 mg/dL    Creatinine 3.24 (H) 0.52 - 1.04 mg/dL    GFR Estimate 14 (L) >60 mL/min/1.7m2    GFR Estimate If Black 17 (L) >60 mL/min/1.7m2    Calcium 8.3 (L) 8.5 - 10.1 mg/dL   Glucose by meter   Result Value Ref Range    Glucose 156 (H) 70 - 99 mg/dL   UA with Microscopic reflex to Culture   Result Value Ref Range    Color Urine Yellow     Appearance Urine Clear     Glucose Urine Negative NEG^Negative mg/dL    Bilirubin Urine Negative NEG^Negative    Ketones Urine Negative NEG^Negative mg/dL    Specific Gravity Urine 1.018 1.003 - 1.035    Blood Urine Small (A) NEG^Negative    pH Urine 5.0 5.0 - 7.0 pH    Protein Albumin Urine 30 (A) NEG^Negative mg/dL    Urobilinogen mg/dL Normal 0.0 - 2.0 mg/dL    Nitrite Urine Negative NEG^Negative    Leukocyte Esterase Urine Moderate (A) NEG^Negative    Source Catheterized Urine     WBC Urine 9 (H) 0 - 5 /HPF    RBC Urine 5 (H) 0 - 2 /HPF    Squamous Epithelial /HPF Urine <1 0 - 1 /HPF    Mucous Urine Present (A) NEG^Negative /LPF   Sodium random urine   Result Value Ref Range    Sodium Urine mmol/L 10 mmol/L   Creatinine random urine   Result Value Ref Range    Creatinine Urine Random 152 mg/dL   Fractional excretion of sodium   Result Value Ref Range    Creatinine Urine 151 mg/dL    Sodium Urine mmol/L 11 mmol/L    %FENA 0.2 %   Glucose by meter   Result Value Ref Range    Glucose 232 (H) 70 - 99 mg/dL   Basic metabolic panel   Result Value Ref Range    Sodium 132 (L) 133 - 144 mmol/L    Potassium 5.0 3.4 - 5.3 mmol/L    Chloride 101 94 - 109 mmol/L    Carbon Dioxide 26 20 - 32 mmol/L    Anion Gap 5 3 - 14 mmol/L    Glucose 130 (H) 70 - 99 mg/dL    Urea Nitrogen 57 (H) 7 - 30 mg/dL    Creatinine 3.03 (H) 0.52 -  1.04 mg/dL    GFR Estimate 15 (L) >60 mL/min/1.7m2    GFR Estimate If Black 18 (L) >60 mL/min/1.7m2    Calcium 7.9 (L) 8.5 - 10.1 mg/dL   Hepatic panel   Result Value Ref Range    Bilirubin Direct 0.4 (H) 0.0 - 0.2 mg/dL    Bilirubin Total 0.8 0.2 - 1.3 mg/dL    Albumin 2.4 (L) 3.4 - 5.0 g/dL    Protein Total 5.7 (L) 6.8 - 8.8 g/dL    Alkaline Phosphatase 105 40 - 150 U/L     (H) 0 - 50 U/L     (H) 0 - 45 U/L     *Note: Due to a large number of results and/or encounters for the requested time period, some results have not been displayed. A complete set of results can be found in Results Review.         Ruel Humphrey[DW1.1]       Revision History        User Key Date/Time User Provider Type Action    > DW1.1 10/23/2018 11:11 AM Ruel Humphrey PA-C Physician Assistant Sign                  Procedure Notes     No notes of this type exist for this encounter.      Progress Notes - Therapies (Notes from 10/23/18 through 10/26/18)     No notes of this type exist for this encounter.

## 2018-10-19 NOTE — IP AVS SNAPSHOT
` [place] [address] [facility]: [phone]            Medication Administration Report for Helene Gibbs as of 10/26/18 1407   Legend:    Given Hold Not Given Due Canceled Entry Other Actions    Time Time (Time) Time  Time-Action       Inactive    Active    Linked        Medications 10/20/18 10/21/18 10/22/18 10/23/18 10/24/18 10/25/18 10/26/18    acetaminophen (TYLENOL) tablet 650 mg  Dose: 650 mg  Freq: EVERY 4 HOURS PRN Route: PO  PRN Reason: other  PRN Comment: multimodal surgical pain management along with NSAIDS and opioid medication as indicated based on pain control and physical function.  Start: 10/23/18 1000   Admin Instructions: May give first dose 4 hours after last scheduled dose of acetaminophen.  Maximum acetaminophen dose from all sources = 75 mg/kg/day not to exceed 4 grams/day.    Admin. Amount: 2 tablet (2 × 325 mg tablet)  Last Admin: 10/26/18 0023  Dispense Loc:  [facility]  POC: Post-procedure         0210 (650 mg)-Given       1629 (650 mg)-Given       2029 (650 mg)-Given         0023 (650 mg)-Given           albuterol (PROAIR HFA/PROVENTIL HFA/VENTOLIN HFA) 108 (90 Base) MCG/ACT inhaler 2 puff  Dose: 2 puff  Freq: EVERY 4 HOURS PRN Route: IN  PRN Reasons: shortness of breath / dyspnea,wheezing  Start: 10/20/18 1329   Admin. Amount: 2 puff  Dispense Loc:  Main Pharmacy               albuterol neb solution 2.5 mg  Dose: 2.5 mg  Freq: 4 TIMES DAILY Route: NEBULIZATION  Start: 10/21/18 0800   Admin Instructions: Formulary alternate for  duonebs since using incruse ellipta    Admin. Amount: 2.5 mg = 3 mL Conc: 2.5 mg/3 mL  Last Admin: 10/1937  Dispense Loc:  [facility]  Volume: 3 mL      (0813)-Not Given       1128 (2.5 mg)-Given       1546 (2.5 mg)-Given       1934 (2.5 mg)-Given        (0734)-Not Given       1210 (2.5 mg)-Given       1548 (2.5 mg)-Given       2001 (2.5 mg)-Given        0716 (2.5 mg)-Given       (1329)-Not Given       1544 (2.5 mg)-Given        (1938)-Not Given        0752 (2.5 mg)-Given       (1113)-Not Given [C]       1556 (2.5 mg)-Given       2109 (2.5 mg)-Given        (0734)-Not Given       (1141)-Not Given       (1538)-Not Given       1937 (2.5 mg)-Given        (0826)-Not Given       (1127)-Not Given       [ ] 1500       [ ] 1900           albuterol neb solution 2.5 mg  Dose: 2.5 mg  Freq: EVERY 2 HOURS PRN Route: NEBULIZATION  PRN Reason: other  PRN Comment: dyspnea  Start: 10/20/18 0124   Admin. Amount: 2.5 mg = 3 mL Conc: 2.5 mg/3 mL  Dispense Loc: [facility] 55[facility]  Volume: 3 mL     0648-Auto Hold       1224-Unhold                 aspirin EC tablet 81 mg  Dose: 81 mg  Freq: DAILY Route: PO  Start: 10/24/18 1515   Admin Instructions: DO NOT CRUSH.    Admin. Amount: 1 tablet (1 × 81 mg tablet)  Last Admin: 10/26/18 1001  Dispense Loc:  [facility]         1630 (81 mg)-Given        0847 (81 mg)-Given        1001 (81 mg)-Given           benzocaine-menthol (CHLORASEPTIC) 6-10 MG lozenge 1-2 lozenge  Dose: 1-2 lozenge  Freq: EVERY 1 HOUR PRN Route: BU  PRN Reason: sore throat  PRN Comment: sore throat without fever  Start: 10/20/18 1239   Admin. Amount: 1-2 lozenge  Last Admin: 10/20/18 2048  Dispense Loc: [facility] 55B  POC: Post-procedure     2048 (2 lozenge)-Given                 bisacodyl (DULCOLAX) Suppository 10 mg  Dose: 10 mg  Freq: DAILY Route: RE  Start: 10/24/18 1545   Admin. Amount: 1 suppository (1 × 10 mg suppository)  Last Admin: 10/25/18 1204  Dispense Loc:  [facility]         1631 (10 mg)-Given        1204 (10 mg)-Given        (0900)-Not Given           bisacodyl (DULCOLAX) Suppository 10 mg  Dose: 10 mg  Freq: DAILY PRN Route: RE  PRN Reason: constipation  Start: 10/20/18 0127   Admin Instructions: Hold for loose stools.  This is the third step of a three step constipation treatment.    Admin. Amount: 1 suppository (1 × 10 mg suppository)  Last Admin: 10/24/18 0140  Dispense Loc:  [facility]     0648-Auto Hold       1224-Unhold           0140 (10  mg)-Given             cefuroxime (CEFTIN) tablet 500 mg  Dose: 500 mg  Freq: EVERY 12 HOURS SCHEDULED Route: PO  Indications of Use: COMMUNITY ACQUIRED PNEUMONIA  Start: 10/26/18 1015   Admin. Amount: 1 tablet (1 × 500 mg tablet)  Last Admin: 10/26/18 1353  Dispense Loc: [facility] 55B           1353 (500 mg)-Given       [ ] 2000           enoxaparin (LOVENOX) injection 40 mg  Dose: 40 mg  Freq: EVERY 24 HOURS Route: SC  Start: 10/26/18 1030   Admin. Amount: 40 mg = 0.4 mL Conc: 40 mg/0.4 mL  Last Admin: 10/26/18 1142  Dispense Loc:  [facility]  Volume: 0.4 mL           1142 (40 mg)-Given           fluticasone (FLONASE) 50 MCG/ACT spray 1-2 spray  Dose: 1-2 spray  Freq: DAILY PRN Route: BOTH NOSTRIL  PRN Reasons: rhinitis,allergies  Start: 10/20/18 1329   Admin. Amount: 1-2 spray  Dispense Loc:  Main Pharmacy               glucose gel 15-30 g  Dose: 15-30 g  Freq: EVERY 15 MIN PRN Route: PO  PRN Reason: low blood sugar  Start: 10/21/18 0742   Admin Instructions: Give 15 g for BG 51 to 69 mg/dL IF patient is conscious and able to swallow. Give 30 g for BG less than or equal to 50 mg/dL IF patient is conscious and able to swallow. Do NOT give glucose gel via enteral tube.  IF patient has enteral tube: give apple juice 120 mL (4 oz or 15 g of CHO) via enteral tube for BG 51 to 69 mg/dL.  Give apple juice 240 mL (8 oz or 30 g of CHO) via enteral tube for BG less than or equal to 50 mg/dL.    ~Oral gel is preferable for conscious and able to swallow patient.   ~IF gel unavailable or patient refuses may provide apple juice 120 mL (4 oz or 15 g of CHO). Document juice on I and O flowsheet.    Admin. Amount: 15-30 g  Dispense Loc:  [facility]  Volume: 93.75 mL              Or  dextrose 50 % injection 25-50 mL  Dose: 25-50 mL  Freq: EVERY 15 MIN PRN Route: IV  PRN Reason: low blood sugar  Start: 10/21/18 0742   Admin Instructions: Use if have IV access, BG less than 70 mg/dL and meet dose criteria below:  Dose if conscious and  alert (or disorientated) and NPO = 25 mL  Dose if unconscious / not alert = 50 mL  Vesicant. For ordered doses up to 25 g, give IV Push undiluted. Give each 5g over 1 minute.    Admin. Amount: 25-50 mL  Dispense Loc:  [facility]  Infused Over: 1-5 Minutes  Volume: 50 mL              Or  glucagon injection 1 mg  Dose: 1 mg  Freq: EVERY 15 MIN PRN Route: SC  PRN Reason: low blood sugar  PRN Comment: May repeat x 1 only  Start: 10/21/18 0742   Admin Instructions: May give SQ or IM. ONLY use glucagon IF patient has NO IV access AND is UNABLE to swallow AND blood glucose is LESS than or EQUAL to 50 mg/dL.  If ordered IV, give IV Push over 1 minute. Reconstitute with 1mL sterile water.    Admin. Amount: 1 mg  Dispense Loc:  [facility]               guaiFENesin (MUCINEX) 12 hr tablet 600 mg  Dose: 600 mg  Freq: 2 TIMES DAILY Route: PO  Start: 10/20/18 2100   Admin Instructions: DO NOT CRUSH.    Admin. Amount: 1 tablet (1 × 600 mg tablet)  Last Admin: 10/26/18 1001  Dispense Loc:  [facility]     2138 (600 mg)-Given        0815 (600 mg)-Given       2133 (600 mg)-Given        0833 (600 mg)-Given       2142 (600 mg)-Given        0856 (600 mg)-Given       2110 (600 mg)-Given        0814 (600 mg)-Given       2029 (600 mg)-Given        0847 (600 mg)-Given       2101 (600 mg)-Given        1001 (600 mg)-Given       [ ] 2100           HYDROmorphone (PF) (DILAUDID) injection 0.3-0.5 mg  Dose: 0.3-0.5 mg  Freq: EVERY 2 HOURS PRN Route: IV  PRN Reason: other  PRN Comment: for pain control or improvement in physical function.  Hold dose for analgesic side effects.  Start: 10/20/18 0127   Admin Instructions: Start at the lowest dose.  May adjust dose by 0.1 mg every 2 hours as needed.  Notify provider to assess for uncontrolled pain or analgesic side effects. Hold while on PCA or with regular IV opioid dosing  For ordered IV doses 0.1-4 mg give IV Push undiluted. Administer each 2mg over 2-5 minutes.    Admin. Amount: 0.3-0.5 mg  Last  Admin: 10/20/18 1339  Dispense Loc: [facility] 55B     0308 (0.5 mg)-Given       0648-Auto Hold       1224-Unhold       1339 (0.3 mg)-Given                 hydrOXYzine (ATARAX) tablet 25 mg  Dose: 25 mg  Freq: 2 TIMES DAILY PRN Route: PO  PRN Reason: itching  Start: 10/20/18 1329   Admin. Amount: 1 tablet (1 × 25 mg tablet)  Dispense Loc: [facility] 55B               insulin aspart (NovoLOG) inj (RAPID ACTING)  Dose: 1-3 Units  Freq: AT BEDTIME Route: SC  Start: 10/21/18 2200   Admin Instructions: LOW INSULIN RESISTANCE DOSING    Do Not give Bedtime Correction Insulin if BG less than 200.   For  - 299 give 1 unit.   For  - 399 give 2 units   For BG greater than or equal 400 give 3 units.   Notify provider if glucose greater than or equal to 350 mg/dL after administration of correction dose.  If given at mealtime, administer within 30 minutes of start of meal    Admin. Amount: 1-3 Units  Dispense Loc: Contact Rx for dose  Volume: 3 mL      (2235)-Not Given        (2144)-Not Given        (2111)-Not Given        (2224)-Not Given        (2255)-Not Given        [ ] 2200           insulin aspart (NovoLOG) inj (RAPID ACTING)  Dose: 1-3 Units  Freq: 3 TIMES DAILY BEFORE MEALS Route: SC  Start: 10/21/18 0745   Admin Instructions: Correction Scale - LOW INSULIN RESISTANCE DOSING     Do Not give Correction Insulin if Pre-Meal BG less than 140.   For Pre-Meal  - 239 give 1 unit.   For Pre-Meal  - 339 give 2 units.   For Pre-Meal BG greater than or equal to 340 give 3 units.   To be given with prandial insulin, and based on pre-meal blood glucose.   Notify provider if glucose greater than or equal to 350 mg/dL after administration of correction dose.  If given at mealtime, administer within 30 minutes of start of meal    Admin. Amount: 1-3 Units  Last Admin: 10/25/18 1200  Dispense Loc: Contact Rx for dose  Volume: 3 mL      (0816)-Not Given       1349 (1 Units)-Given [C]       1839 (1 Units)-Given        0839 (1  "Units)-Given [C]       1233 (1 Units)-Given [C]       1752 (1 Units)-Given        (0858)-Not Given       1212 (1 Units)-Given [C]       1839 (1 Units)-Given        (0820)-Not Given       1214 (1 Units)-Given       (1745)-Not Given        (0744)-Not Given [C]       1200 (1 Units)-Given [C]       (1848)-Not Given        (0830)-Not Given       (1300)-Not Given       [ ] 1700           lidocaine (LMX4) cream  Freq: EVERY 1 HOUR PRN Route: Top  PRN Reason: pain  PRN Comment: with VAD insertion or accessing implanted port.  Start: 10/20/18 0125   Admin Instructions: Do NOT give if patient has a history of allergy to any local anesthetic or any \"cristian\" product.   Apply 30 minutes prior to VAD insertion or port access.  MAX Dose:  2.5 g (  of 5 g tube)    Dispense Loc: Contact Rx for dose     0648-Auto Hold       1224-Unhold                 lidocaine 1 % 1 mL  Dose: 1 mL  Freq: EVERY 1 HOUR PRN Route: OTHER  PRN Comment: mild pain with VAD insertion or accessing implanted port  Start: 10/20/18 0125   Admin Instructions: Do NOT give if patient has a history of allergy to any local anesthetic or any \"cristian\" product. MAX dose 1 mL subcutaneous OR intradermal in divided doses.    Admin. Amount: 1 mL  Dispense Loc:  [facility]  Volume: 20 mL     0648-Auto Hold       1224-Unhold                 melatonin tablet 1 mg  Dose: 1 mg  Freq: AT BEDTIME PRN Route: PO  PRN Reason: sleep  Start: 10/20/18 0124   Admin Instructions: Do not give unless at least 6 hours of uninterrupted sleep is expected.    Admin. Amount: 1 tablet (1 × 1 mg tablet)  Dispense Loc:  [facility]     0648-Auto Hold       1224-Unhold                 metoclopramide (REGLAN) tablet 5 mg  Dose: 5 mg  Freq: 3 TIMES DAILY PRN Route: PO  PRN Comment: nausea  Start: 10/20/18 7501   Admin Instructions: Recommend to take before meals. Avoid use if patient has full bowel obstruction or perforation. If the patient has multiple antiemetics ordered PRN, offer in the following " order per policy.  Move to the next available step if earlier step is ineffective.<br>Step 1 - ondansetron; Step 2 - prochlorperazine; Step 3 - metoclopramide; Step 4 - promethazine; Step 5 - lorazepam; Step 6 - diphenhydramine.    Admin. Amount: 1 tablet (1 × 5 mg tablet)  Dispense Loc: [facility] 55B               naloxone (NARCAN) injection 0.1-0.4 mg  Dose: 0.1-0.4 mg  Freq: EVERY 2 MIN PRN Route: IV  PRN Reason: opioid reversal  Start: 10/20/18 1239   Admin Instructions: For respiratory rate LESS than or EQUAL to 8.  Partial reversal dose:  0.1 mg titrated q 2 minutes for Analgesia Side Effects Monitoring Sedation Level of 3 (frequently drowsy, arousable, drifts to sleep during conversation).Full reversal dose:  0.4 mg bolus for Analgesia Side Effects Monitoring Sedation Level of 4 (somnolent, minimal or no response to stimulation).  For ordered IV doses 0.1-2mg give IVP. Give each 0.4mg over 15 seconds in emergency situations. For non-emergent situations further dilute in 9mL of NS to facilitate titration of response.    Admin. Amount: 0.1-0.4 mg = 0.25-1 mL Conc: 0.4 mg/mL  Dispense Loc:  [facility]  Volume: 1 mL  POC: Post-procedure               nystatin (MYCOSTATIN) suspension 100,000 Units  Dose: 100,000 Units  Freq: 4 TIMES DAILY Route: SWISH & SWAL  Start: 10/26/18 1300   Admin. Amount: 100,000 Units = 1 mL Conc: 100,000 Units/mL  Last Admin: 10/26/18 1331  Dispense Loc:  [facility]  Volume: 5 mL           1331 (100,000 Units)-Given       [ ] 1800       [ ] 2200           ondansetron (ZOFRAN-ODT) ODT tab 4 mg  Dose: 4 mg  Freq: EVERY 6 HOURS PRN Route: PO  PRN Reasons: nausea,vomiting  Start: 10/20/18 1239   Admin Instructions: This is Step 1 of nausea and vomiting management.  If nausea not resolved in 15 minutes, go to Step 2 prochlorperazine (COMPAZINE). Do not push through foil backing. Peel back foil and gently remove. Place on tongue immediately. Administration with liquid unnecessary  With dry hands,  peel back foil backing and gently remove tablet; do not push oral disintegrating tablet through foil backing; administer immediately on tongue and oral disintegrating tablet dissolves in seconds; then swallow with saliva; liquid not required.    Admin. Amount: 1 tablet (1 × 4 mg tablet)  Dispense Loc:  [facility]  POC: Post-procedure              Or  ondansetron (ZOFRAN) injection 4 mg  Dose: 4 mg  Freq: EVERY 6 HOURS PRN Route: IV  PRN Reasons: nausea,vomiting  Start: 10/20/18 1239   Admin Instructions: This is Step 1 of nausea and vomiting management.  If nausea not resolved in 15 minutes, go to Step 2 prochlorperazine (COMPAZINE).  Irritant. For ordered IV doses 0.1-4 mg, give IV Push undiluted over 2-5 minutes.    Admin. Amount: 4 mg = 2 mL Conc: 4 mg/2 mL  Dispense Loc:  [facility]  Infused Over: 2-5 Minutes  Volume: 2 mL  POC: Post-procedure               oxyCODONE IR (ROXICODONE) tablet 5 mg  Dose: 5 mg  Freq: EVERY 3 HOURS PRN Route: PO  PRN Reason: other  PRN Comment: pain control or improvement in physical function. Hold dose for analgesic side effects.  Start: 10/20/18 1239   Admin Instructions: Notify provider to assess for uncontrolled pain or analgesic side effects. Hold while on PCA or with regular IV opioid dosing.  Maximum total  is 40 mg in 24 hours.    Admin. Amount: 1 tablet (1 × 5 mg tablet)  Last Admin: 10/26/18 1342  Dispense Loc:  [facility]  POC: Post-procedure      0132 (5 mg)-Given       0625 (5 mg)-Given       0938 (5 mg)-Given       1228 (5 mg)-Given       1618 (5 mg)-Given       2241 (5 mg)-Given        0150 (5 mg)-Given       0501 (5 mg)-Given       2147 (5 mg)-Given        0902 (5 mg)-Given       1940 (5 mg)-Given        0433 (5 mg)-Given       1629 (5 mg)-Given        0051 (5 mg)-Given       0430 (5 mg)-Given [C]       1011 (5 mg)-Given       1300 (5 mg)-Given       1634 (5 mg)-Given       2101 (5 mg)-Given        0023 (5 mg)-Given       0652 (5 mg)-Given       1342 (5 mg)-Given            polyethylene glycol (MIRALAX/GLYCOLAX) Packet 17 g  Dose: 17 g  Freq: 2 TIMES DAILY Route: PO  Start: 10/24/18 2100   Admin Instructions: 1 Packet = 17 grams. Mixed prescribed dose in 8 ounces of water. Follow with 8 oz. of water.    Admin. Amount: 17 g  Last Admin: 10/26/18 1002  Dispense Loc: [facility] 55B         2029 (17 g)-Given        0848 (17 g)-Given       (2106)-Not Given [C]        1002 (17 g)-Given       [ ] 2100           polyethylene glycol (MIRALAX/GLYCOLAX) Packet 17 g  Dose: 17 g  Freq: DAILY PRN Route: PO  PRN Reason: constipation  Start: 10/20/18 0127   Admin Instructions: Give in 8oz of  water, juice, or soda. Hold for loose stools.  This is the second step of a three step constipation treatment.  1 Packet = 17 grams. Mixed prescribed dose in 8 ounces of water. Follow with 8 oz. of water.    Admin. Amount: 17 g  Dispense Loc: [facility] 55B     0648-Auto Hold       1224-Unhold                 prochlorperazine (COMPAZINE) injection 5 mg  Dose: 5 mg  Freq: EVERY 6 HOURS PRN Route: IV  PRN Reasons: nausea,vomiting  Start: 10/20/18 0127   Admin Instructions: This is Step 2 of nausea and vomiting management. Give if nausea not resolved 15 minutes after giving ondansetron (ZOFRAN). If nausea not resolved in 15 minutes, go to Step 3 metoclopramide (REGLAN), if ordered.  For ordered IV doses 0.1-10 mg, give IV Push undiluted. Each 5mg over 1 minute.    Admin. Amount: 5 mg = 1 mL Conc: 5 mg/mL  Dispense Loc:  [facility]  Infused Over: 1-2 Minutes  Volume: 1 mL     0648-Auto Hold       1224-Unhold                Or  prochlorperazine (COMPAZINE) tablet 5 mg  Dose: 5 mg  Freq: EVERY 6 HOURS PRN Route: PO  PRN Reason: vomiting  Start: 10/20/18 0127   Admin Instructions: This is Step 2 of nausea and vomiting management. Give if nausea not resolved 15 minutes after giving ondansetron (ZOFRAN). If nausea not resolved in 15 minutes, go to Step 3 metoclopramide (REGLAN), if ordered.    Admin. Amount: 1 tablet (1 × 5 mg  tablet)  Dispense Loc: [address]     0648-Auto Hold       1224-Unhold                Or  prochlorperazine (COMPAZINE) Suppository 12.5 mg  Dose: 12.5 mg  Freq: EVERY 12 HOURS PRN Route: RE  PRN Reasons: nausea,vomiting  Start: 10/20/18 0127   Admin Instructions: This is Step 2 of nausea and vomiting management. Give if nausea not resolved 15 minutes after giving ondansetron (ZOFRAN). If nausea not resolved in 15 minutes, go to Step 3 metoclopramide (REGLAN), if ordered.    Admin. Amount: 0.5 suppository (0.5 × 25 mg suppository)  Dispense Loc: [facility]     0648-Auto Hold       1224-Unhold                 ranitidine (ZANTAC) tablet 150 mg  Dose: 150 mg  Freq: AT BEDTIME Route: PO  Start: 10/20/18 2200   Admin Instructions: Renally dosed for crcl <50 ml/min    Admin. Amount: 1 tablet (1 × 150 mg tablet)  Last Admin: 10/25/18 2102  Dispense Loc: [facility] 55     2138 (150 mg)-Given        2133 (150 mg)-Given        2143 (150 mg)-Given        2110 (150 mg)-Given        2029 (150 mg)-Given               2102 (150 mg)-Given        [ ] 2200           senna-docusate (SENOKOT-S;PERICOLACE) 8.6-50 MG per tablet 1 tablet  Dose: 1 tablet  Freq: 2 TIMES DAILY PRN Route: PO  PRN Reason: constipation  Start: 10/20/18 0127   Admin Instructions: If no bowel movement in 24 hours, increase to 2 tablets PO.  Hold for loose stools.  This is the first step of a three step constipation treatment.    Admin. Amount: 1 tablet  Dispense Loc: [facility] 55B     0648-Auto Hold       1224-Unhold                       Or  senna-docusate (SENOKOT-S;PERICOLACE) 8.6-50 MG per tablet 2 tablet  Dose: 2 tablet  Freq: 2 TIMES DAILY PRN Route: PO  PRN Reason: constipation  Start: 10/20/18 0127   Admin Instructions: Hold for loose stools.  This is the first step of a three step constipation treatment.    Admin. Amount: 2 tablet  Last Admin: 10/26/18 1000  Dispense Loc: [facility] 55B     0648-Auto Hold       1224-Unhold             1000 (2  tablet)-Given           senna-docusate (SENOKOT-S;PERICOLACE) 8.6-50 MG per tablet 2 tablet  Dose: 2 tablet  Freq: 2 TIMES DAILY Route: PO  Start: 10/24/18 2100   Admin Instructions: Hold for loose stools.    Admin. Amount: 2 tablet  Last Admin: 10/25/18 0848  Dispense Loc: [facility] 55B  POC: Post-procedure         2029 (2 tablet)-Given        0848 (2 tablet)-Given       (2106)-Not Given [C]        (1019)-Not Given [C]       [ ] 2100           simvastatin (ZOCOR) tablet 10 mg  Dose: 10 mg  Freq: AT BEDTIME Route: PO  Start: 10/20/18 0130   Admin. Amount: 1 tablet (1 × 10 mg tablet)  Last Admin: 10/25/18 2102  Dispense Loc: [facility] 55B     (0302)-Not Given       0648-Auto Hold       1224-Unhold       2138 (10 mg)-Given        2133 (10 mg)-Given        2144 (10 mg)-Given        2110 (10 mg)-Given        2029 (10 mg)-Given               2102 (10 mg)-Given        [ ] 2200           sodium chloride (PF) 0.9% PF flush 3 mL  Dose: 3 mL  Freq: EVERY 8 HOURS Route: IK  Start: 10/20/18 1245   Admin Instructions: And Q1H PRN, to lock peripheral IV dormant line.    Admin. Amount: 3 mL  Last Admin: 10/21/18 1339  Dispense Loc: [facility] Floor Stock  Volume: 3 mL  POC: Post-procedure   Current Line: Peripheral IV 10/23/18 Right Lower forearm    (1331)-Not Given       (2049)-Not Given        (0506)-Not Given       1339 (3 mL)-Given       (2134)-Not Given        (0505)-Not Given       (1331)-Not Given       (2114)-Not Given        (0453)-Not Given       (1207)-Not Given       (2101)-Not Given        (0615)-Not Given       (1534)-Not Given       (2036)-Not Given        (0608)-Not Given       (1202)-Not Given       (2107)-Not Given        (0351)-Not Given       (1304)-Not Given       [ ] 2045           sodium chloride (PF) 0.9% PF flush 3 mL  Dose: 3 mL  Freq: EVERY 1 HOUR PRN Route: IK  PRN Reason: line flush  PRN Comment: for peripheral IV flush post IV meds  Start: 10/20/18 1239   Admin. Amount: 3 mL  Dispense Loc: [facility] Floor Stock  Volume:  3 mL  POC: Post-procedure               sodium chloride (PF) 0.9% PF flush 3 mL  Dose: 3 mL  Freq: EVERY 8 HOURS Route: IK  Start: 10/20/18 0130   Admin Instructions: And Q1H PRN, to lock peripheral IV dormant line.    Admin. Amount: 3 mL  Last Admin: 10/25/18 1634  Dispense Loc: [facility] Floor Stock  Volume: 3 mL   Current Line: Peripheral IV 10/23/18 Right Lower forearm    0234 (3 mL)-Given       0648-Auto Hold       0930 Auto Hold-Automatically Held       1224-Unhold       (1655)-Not Given        (0041)-Not Given              (1722)-Not Given        (0053)-Not Given       (0841)-Not Given       (1752)-Not Given        (0046)-Not Given       (0858)-Not Given       (1834)-Not Given        (0146)-Not Given       (1534)-Not Given       (1633)-Not Given        (0103)-Not Given       (0848)-Not Given       1634 (3 mL)-Given        (0153)-Not Given       (1022)-Not Given       [ ] 1730           sodium chloride (PF) 0.9% PF flush 3 mL  Dose: 3 mL  Freq: EVERY 1 HOUR PRN Route: IK  PRN Reason: line flush  PRN Comment: for peripheral IV flush post IV meds  Start: 10/20/18 0125   Admin. Amount: 3 mL  Dispense Loc: [facility] Floor Stock  Volume: 3 mL     0648-Auto Hold       1224-Unhold                 umeclidinium (INCRUSE ELLIPTA) 62.5 MCG/INH inhaler 1 puff  Dose: 1 puff  Freq: EVERY EVENING Route: IN  Start: 10/20/18 2000   Admin Instructions: Use only ONE capsule. To ensure the full dose is administered, TWO inhalations should be performed at a rate sufficient to hear or feel the capsule vibrate. Formulary alternate for<br>spiriva    Admin. Amount: 1 puff  Last Admin: 10/23/18 2110  Dispense Loc:  Main Pharmacy     2043 (1 puff)-Given        2132 (1 puff)-Given        2145 (1 puff)-Given        2110 (1 puff)-Given        (2029)-Not Given        (2103)-Not Given        [ ] 2000          Future Medications  Medications 10/20/18 10/21/18 10/22/18 10/23/18 10/24/18 10/25/18 10/26/18       metoprolol tartrate (LOPRESSOR) tablet 25  mg  Dose: 25 mg  Freq: 2 TIMES DAILY Route: PO  Start: 10/26/18 2100   Admin Instructions: Hold for SBP < 100 or HR < 60    Admin. Amount: 1 tablet (1 × 25 mg tablet)  Dispense Loc: [facility] 55B           [ ] 2100          Completed Medications  Medications 10/20/18 10/21/18 10/22/18 10/23/18 10/24/18 10/25/18 10/26/18         Dose: 30 mL  Freq: ONCE Route: PO  Start: 10/23/18 1200   End: 10/23/18 160   Admin. Amount: 30 mL  Last Admin: 10/23/18 160  Dispense Loc:  [facility]  Administrations Remainin  Volume: 30 mL        1606 (30 mL)-Given             Discontinued Medications  Medications 10/20/18 10/21/18 10/22/18 10/23/18 10/24/18 10/25/18 10/26/18         Dose: 2.5 mg  Freq: 2 TIMES DAILY Route: PO  Start: 10/25/18 2100   End: 10/25/18 1514   Admin. Amount: 1 tablet (1 × 2.5 mg tablet)  Dispense Loc: Pharmacy to Load to ADS          1514-Med Discontinued          Dose: 2 g  Freq: EVERY 24 HOURS Route: IV  Indications of Use: COMMUNITY ACQUIRED PNEUMONIA  Start: 10/25/18 151   End: 10/26/18 100   Admin. Amount: 2 g  Last Admin: 10/25/18 163  Dispense Loc: [facility]  Infused Over: 30 Minutes  Volume: 20 mL          1634 (2 g)-New Bag        1004-Med Discontinued         Dose: 300 mg  Freq: 3 TIMES DAILY Route: PO  Indications of Use: ABSCESS  Indications Comment: dental abscess  Start: 10/21/18 0900   End: 10/25/18 151   Admin. Amount: 1 capsule (1 × 300 mg capsule)  Last Admin: 10/25/18 0847  Dispense Loc:  [facility]      0815 (300 mg)-Given       1605 (300 mg)-Given       2133 (300 mg)-Given        0833 (300 mg)-Given       1750 (300 mg)-Given       2142 (300 mg)-Given        0857 (300 mg)-Given       1606 (300 mg)-Given       2110 (300 mg)-Given        0814 (300 mg)-Given       1629 (300 mg)-Given        (300 mg)-Given               0847 (300 mg)-Given       1514-Med Discontinued          Dose: 5,000 Units  Freq: EVERY 12 HOURS Route: SC  Start: 10/25/18 2200   End: 10/26/18 1021   Admin  Instructions: High concentration HEParin. Not for line flush or cath care.  High concentration heparin. Not for line flush or cath care.    Admin. Amount: 5,000 Units = 0.5 mL Conc: 5,000 Units/0.5 mL  Last Admin: 10/25/18 2101  Dispense Loc:  [facility]  Volume: 0.5 mL          2101 (5,000 Units)-Given        (1017)-Not Given [C]       1021-Med Discontinued         Dose: 5,000 Units  Freq: EVERY 12 HOURS Route: SC  Start: 10/25/18 1800   End: 10/25/18 1547   Admin Instructions: High concentration HEParin. Not for line flush or cath care.  High concentration heparin. Not for line flush or cath care.    Admin. Amount: 5,000 Units = 0.5 mL Conc: 5,000 Units/0.5 mL  Dispense Loc:  [facility]  Volume: 0.5 mL          1547-Med Discontinued          Dose: 5,000 Units  Freq: EVERY 12 HOURS Route: SC  Start: 10/23/18 1030   End: 10/25/18 1449   Admin Instructions: High concentration HEParin. Not for line flush or cath care.  High concentration heparin. Not for line flush or cath care.    Admin. Amount: 5,000 Units = 0.5 mL Conc: 5,000 Units/0.5 mL  Last Admin: 10/25/18 1202  Dispense Loc:  [facility]  Volume: 0.5 mL        1159 (5,000 Units)-Given        0144 (5,000 Units)-Given       1211 (5,000 Units)-Given        0052 (5,000 Units)-Given       1202 (5,000 Units)-Given       1449-Med Discontinued          Dose: 12.5 mg  Freq: 2 TIMES DAILY Route: PO  Start: 10/25/18 2100   End: 10/26/18 1004   Admin Instructions: Hold for SBP < 100 or HR < 60    Admin. Amount: 1 half-tab (1 × 12.5 mg half-tab)  Last Admin: 10/26/18 1001  Dispense Loc:  [facility]          2101 (12.5 mg)-Given        1001 (12.5 mg)-Given [C]       1004-Med Discontinued         Dose: 500,000 Units  Freq: 4 TIMES DAILY Route: SWISH & SPIT  Start: 10/24/18 1800   End: 10/26/18 1037   Admin. Amount: 500,000 Units = 5 mL Conc: 100,000 Units/mL  Last Admin: 10/25/18 2101  Dispense Loc:  [facility]  Volume: 5 mL         1703 (500,000 Units)-Given       2038 (500,000  Units)-Given               0848 (500,000 Units)-Given       1200 (500,000 Units)-Given       (1800)-Not Given       2101 (500,000 Units)-Given        (0900)-Not Given       1037-Med Discontinued         Dose: 17 g  Freq: DAILY Route: PO  Start: 10/24/18 0900   End: 10/24/18 1530   Admin Instructions: 1 Packet = 17 grams. Mixed prescribed dose in 8 ounces of water. Follow with 8 oz. of water.    Admin. Amount: 17 g  Last Admin: 10/24/18 0813  Dispense Loc: [address]         0813 (17 g)-Given       1530-Med Discontinued           Dose: 1 tablet  Freq: 2 TIMES DAILY Route: PO  Start: 10/20/18 1239   End: 10/24/18 1530   Admin Instructions: If no bowel movement in 24 hours, increase to 2 tablets PO.  Hold for loose stools.    Admin. Amount: 1 tablet  Last Admin: 10/22/18 0841  Dispense Loc: [address]  POC: Post-procedure     (1331)-Not Given                              0841 (1 tablet)-Given [C]                                     1530-Med Discontinued        Or    Dose: 2 tablet  Freq: 2 TIMES DAILY Route: PO  Start: 10/24/18 2100   End: 10/24/18 1550   Admin Instructions: If no bowel movement in 24 hours, increase to 2 tablets PO.  Hold for loose stools.    Admin. Amount: 2 tablet  Dispense Loc: [place] [id number]  POC: Post-procedure         1550-Med Discontinued                                   Or    Dose: 2 tablet  Freq: 2 TIMES DAILY Route: PO  Start: 10/20/18 1239   End: 10/24/18 1530   Admin Instructions: Hold for loose stools.    Admin. Amount: 2 tablet  Last Admin: 10/24/18 0813  Dispense Loc: [place] [id number]  POC: Post-procedure            2138 (2 tablet)-Given        0815 (2 tablet)-Given       2133 (2 tablet)-Given               2143 (2 tablet)-Given        0856 (2 tablet)-Given       2110 (2 tablet)-Given        0813 (2 tablet)-Given       1530-Med Discontinued           Rate: 50 mL/hr   Freq: CONTINUOUS Route: IV  Start: 10/22/18 1515   End: 10/26/18 0801   Last Admin: 10/25/18 1456  Dispense Loc: [facility]  Stock  Volume: 1,000 mL   Current Line: Peripheral IV 10/23/18 Right Lower forearm      2144 ( )-New Bag        0958 ( )-New Bag       1017 ( )-Rate/Dose Change       1609-Stopped [C]       1800 ( )-Restarted       1940 ( )-New Bag        0500-Stopped [C]       0825 ( )-New Bag       1100 ( )-Rate/Dose Change       1744 ( )-New Bag        0608 ( )-New Bag       1414 ( )-New Bag       1456 ( )-Rate/Dose Change        0801-Med Discontinued    Medications 10/20/18 10/21/18 10/22/18 10/23/18 10/24/18 10/25/18 10/26/18

## 2018-10-19 NOTE — IP AVS SNAPSHOT
"    [place] [address] [facility]: [phone]                                              INTERAGENCY TRANSFER FORM - LAB / IMAGING / EKG / EMG RESULTS   10/19/2018                    Hospital Admission Date: 10/19/2018  PATRICIA BOBO   : 1935  Sex: Female        Attending Provider: Imer Reyes MD     Allergies:  Ambien [Zolpidem Tartrate], Penicillins, Definity, Sulfa Drugs, Cymbalta, Fluconazole    Infection:  None   Service:  HOSPITALIST    Ht:  1.651 m (5' 5\")   Wt:  95.1 kg (209 lb 11.2 oz)   Admission Wt:  72.6 kg (160 lb)    BMI:  34.9 kg/m 2   BSA:  2.09 m 2            Patient PCP Information     Provider PCP Type    Neisha Esparza MD General         Lab Results - 3 Days      Magnesium [[id number]] (Abnormal)  Resulted: 10/26/18 1105, Result status: Final result    Ordering provider: Ervin Lares MD  10/26/18 0659 Resulting lab: [facility]    Specimen Information    Type Source Collected On     10/26/18 0659          Components       Value Reference Range Flag Lab   Magnesium 3.0 1.6 - 2.3 mg/dL H FrStHsLb            Sputum Culture Aerobic Bacterial [[id number]] (Abnormal)  Resulted: 10/26/18 0844, Result status: Final result    Ordering provider: Imer Reyes MD  10/20/18 0133 Resulting lab: [facility]    Specimen Information    Type Source Collected On   Sputum  10/23/18 0911          Components       Value Reference Range Flag Lab   Specimen Description Sputum      Culture Micro --  A 225   Result:         Light growth  Escherichia coli     Culture Micro --  A 225   Result:         Light growth  Strain 2  Escherichia coli     Culture Micro --   225   Result:         Plus  Light growth  Normal pal              Basic metabolic panel [[id number]] (Abnormal)  Resulted: 10/26/18 0735, Result status: Final result    Ordering provider: Ervin Lares MD  10/26/18 0001 Resulting lab: [facility]    Specimen " Information    Type Source Collected On   Blood  10/26/18 0659          Components       Value Reference Range Flag Lab   Sodium 139 133 - 144 mmol/L  FrStHsLb   Potassium 5.3 3.4 - 5.3 mmol/L  FrStHsLb   Comment:  Specimen slightly hemolyzed, potassium may be falsely elevated   Chloride 111 94 - 109 mmol/L H FrStHsLb   Carbon Dioxide 24 20 - 32 mmol/L  FrStHsLb   Anion Gap 4 3 - 14 mmol/L  FrStHsLb   Glucose 99 70 - 99 mg/dL  FrStHsLb   Urea Nitrogen 32 7 - 30 mg/dL H FrStHsLb   Creatinine 1.16 0.52 - 1.04 mg/dL H FrStHsLb   GFR Estimate 45 >60 mL/min/1.7m2 L FrStHsLb   Comment:  Non  GFR Calc   GFR Estimate If Black 54 >60 mL/min/1.7m2 L FrStHsLb   Comment:  African American GFR Calc   Calcium 8.5 8.5 - 10.1 mg/dL  FrStHsLb            Hemoglobin [[id number]] (Abnormal)  Resulted: 10/26/18 0713, Result status: Final result    Ordering provider: Ervin Lares MD  10/26/18 0001 Resulting lab: [facility]    Specimen Information    Type Source Collected On   Blood  10/26/18 0659          Components       Value Reference Range Flag Lab   Hemoglobin 10.4 11.7 - 15.7 g/dL L FrStHsLb            Platelet count [[id number]]  Resulted: 10/26/18 0713, Result status: Final result    Ordering provider: Ervin Lares MD  10/26/18 0001 Resulting lab: [facility]    Specimen Information    Type Source Collected On   Blood  10/26/18 0659          Components       Value Reference Range Flag Lab   Platelet Count 203 150 - 450 10e9/L  FrStHsLb            Glucose by meter [[id number]] (Abnormal)  Resulted: 10/26/18 0231, Result status: Final result    Ordering provider: Imer Reyes MD  10/26/18 0219 Resulting lab: POINT OF CARE TEST, GLUCOSE    Specimen Information    Type Source Collected On     10/26/18 0219          Components       Value Reference Range Flag Lab   Glucose 120 70 - 99 mg/dL H 170            Glucose by meter [[id number]] (Abnormal)  Resulted: 10/25/18 2222, Result  status: Final result    Ordering provider: Imer Reyes MD  10/25/18 2146 Resulting lab: [facility] CARE TEST, GLUCOSE    Specimen Information    Type Source Collected On     10/25/18 2146          Components       Value Reference Range Flag Lab   Glucose 132 70 - 99 mg/dL H 170            Glucose by meter [[id number]] (Abnormal)  Resulted: 10/25/18 1859, Result status: Final result    Ordering provider: Imer Reyes MD  10/25/18 1847 Resulting lab: POINT OF CARE TEST, GLUCOSE    Specimen Information    Type Source Collected On     10/25/18 1847          Components       Value Reference Range Flag Lab   Glucose 126 70 - 99 mg/dL H 170            Glucose by meter [[id number]] (Abnormal)  Resulted: 10/25/18 1204, Result status: Final result    Ordering provider: Imer Reyes MD  10/25/18 1151 Resulting lab: POINT OF CARE TEST, GLUCOSE    Specimen Information    Type Source Collected On     10/25/18 1151          Components       Value Reference Range Flag Lab   Glucose 142 70 - 99 mg/dL H 170            Glucose by meter [[id number]]  Resulted: 10/25/18 0845, Result status: Final result    Ordering provider: Imer Reyes MD  10/25/18 0826 Resulting lab: POINT OF CARE TEST, GLUCOSE    Specimen Information    Type Source Collected On     10/25/18 0826          Components       Value Reference Range Flag Lab   Glucose 97 70 - 99 mg/dL  170            Comprehensive metabolic panel [[id number]] (Abnormal)  Resulted: 10/25/18 0734, Result status: Final result    Ordering provider: Ervin Lares MD  10/25/18 0001 Resulting lab: [facility]    Specimen Information    Type Source Collected On   Blood  10/25/18 0657          Components       Value Reference Range Flag Lab   Sodium 138 133 - 144 mmol/L  FrStHsLb   Potassium 4.8 3.4 - 5.3 mmol/L  FrStHsLb   Chloride 107 94 - 109 mmol/L  FrStHsLb   Carbon Dioxide 25 20 - 32 mmol/L  FrStHsLb   Anion Gap 6 3 - 14 mmol/L  FrStHsLb   Glucose 115 70 -  99 mg/dL H FrStHsLb   Urea Nitrogen 43 7 - 30 mg/dL H FrStHsLb   Creatinine 1.69 0.52 - 1.04 mg/dL H FrStHsLb   GFR Estimate 29 >60 mL/min/1.7m2 L FrStHsLb   Comment:  Non  GFR Calc   GFR Estimate If Black 35 >60 mL/min/1.7m2 L FrStHsLb   Comment:  African American GFR Calc   Calcium 8.2 8.5 - 10.1 mg/dL L FrStHsLb   Bilirubin Total 0.6 0.2 - 1.3 mg/dL  FrStHsLb   Albumin 2.3 3.4 - 5.0 g/dL L FrStHsLb   Protein Total 6.0 6.8 - 8.8 g/dL L FrStHsLb   Alkaline Phosphatase 94 40 - 150 U/L  FrStHsLb    0 - 50 U/L H FrStHsLb   AST 47 0 - 45 U/L H FrStHsLb            CBC with platelets [[id number]] (Abnormal)  Resulted: 10/25/18 0721, Result status: Final result    Ordering provider: Ervin Lares MD  10/25/18 0001 Resulting lab: [facility]    Specimen Information    Type Source Collected On   Blood  10/25/18 0657          Components       Value Reference Range Flag Lab   WBC 9.1 4.0 - 11.0 10e9/L  FrStHsLb   RBC Count 3.82 3.8 - 5.2 10e12/L  FrStHsLb   Hemoglobin 11.0 11.7 - 15.7 g/dL L FrStHsLb   Hematocrit 34.7 35.0 - 47.0 % L FrStHsLb   MCV 91 78 - 100 fl  FrStHsLb   MCH 28.8 26.5 - 33.0 pg  FrStHsLb   MCHC 31.7 31.5 - 36.5 g/dL  FrStHsLb   RDW 15.8 10.0 - 15.0 % H FrStHsLb   Platelet Count 172 150 - 450 10e9/L  FrStHsLb            Glucose by meter [[id number]] (Abnormal)  Resulted: 10/25/18 0231, Result status: Final result    Ordering provider: Imer Reyes MD  10/25/18 0154 Resulting lab: POINT OF CARE TEST, GLUCOSE    Specimen Information    Type Source Collected On     10/25/18 0154          Components       Value Reference Range Flag Lab   Glucose 134 70 - 99 mg/dL H 170   Comment:  Dr/RN Notified            Glucose by meter [[id number]] (Abnormal)  Resulted: 10/24/18 2136, Result status: Final result    Ordering provider: Imer Reyes MD  10/24/18 2119 Resulting lab: POINT OF CARE TEST, GLUCOSE    Specimen Information    Type Source Collected On     10/24/18 2119           Components       Value Reference Range Flag Lab   Glucose 169 70 - 99 mg/dL H 170            Glucose by meter [[id number]] (Abnormal)  Resulted: 10/24/18 1741, Result status: Final result    Ordering provider: Imer Reyes MD  10/24/18 1714 Resulting lab: POINT OF CARE TEST, GLUCOSE    Specimen Information    Type Source Collected On     10/24/18 1714          Components       Value Reference Range Flag Lab   Glucose 137 70 - 99 mg/dL H 170            Glucose by meter [[id number]] (Abnormal)  Resulted: 10/24/18 1203, Result status: Final result    Ordering provider: Imer Reyes MD  10/24/18 1151 Resulting lab: POINT OF CARE TEST, GLUCOSE    Specimen Information    Type Source Collected On     10/24/18 1151          Components       Value Reference Range Flag Lab   Glucose 161 70 - 99 mg/dL H 170            Basic metabolic panel [[id number]] (Abnormal)  Resulted: 10/24/18 0800, Result status: Final result    Ordering provider: Ervin Lares MD  10/24/18 0000 Resulting lab: [facility]    Specimen Information    Type Source Collected On   Blood  10/24/18 0731          Components       Value Reference Range Flag Lab   Sodium 137 133 - 144 mmol/L  FrStHsLb   Potassium 5.0 3.4 - 5.3 mmol/L  FrStHsLb   Chloride 105 94 - 109 mmol/L  FrStHsLb   Carbon Dioxide 26 20 - 32 mmol/L  FrStHsLb   Anion Gap 6 3 - 14 mmol/L  FrStHsLb   Glucose 121 70 - 99 mg/dL H FrStHsLb   Urea Nitrogen 53 7 - 30 mg/dL H FrStHsLb   Creatinine 2.39 0.52 - 1.04 mg/dL H FrStHsLb   GFR Estimate 19 >60 mL/min/1.7m2 L FrStHsLb   Comment:  Non  GFR Calc   GFR Estimate If Black 24 >60 mL/min/1.7m2 L FrStHsLb   Comment:  African American GFR Calc   Calcium 8.4 8.5 - 10.1 mg/dL L FrStHsLb            Magnesium [[id number]] (Abnormal)  Resulted: 10/24/18 0800, Result status: Final result    Ordering provider: Ervin Lares MD  10/24/18 0000 Resulting lab: [facility]    Specimen Information     Type Source Collected On   Blood  10/24/18 0731          Components       Value Reference Range Flag Lab   Magnesium 3.9 1.6 - 2.3 mg/dL H FrStHsLb            Hemoglobin [[id number]] (Abnormal)  Resulted: 10/24/18 0747, Result status: Final result    Ordering provider: Ervin Lares MD  10/24/18 0000 Resulting lab: [facility]    Specimen Information    Type Source Collected On   Blood  10/24/18 0731          Components       Value Reference Range Flag Lab   Hemoglobin 10.2 11.7 - 15.7 g/dL L FrStHsLb            Platelet count [[id number]]  Resulted: 10/24/18 0747, Result status: Final result    Ordering provider: Imer Reyes MD  10/24/18 0000 Resulting lab: [facility]    Specimen Information    Type Source Collected On   Blood  10/24/18 0731          Components       Value Reference Range Flag Lab   Platelet Count 184 150 - 450 10e9/L  FrStHsLb            Urine Culture Aerobic Bacterial [[id number]]  Resulted: 10/24/18 0440, Result status: Final result    Ordering provider: Imer Reyes MD  10/22/18 2300 Resulting lab: [facility]    Specimen Information    Type Source Collected On   Catheterized Urine  10/22/18 2300          Components       Value Reference Range Flag Lab   Specimen Description Catheterized Urine      Culture Micro No growth   225            Glucose by meter [[id number]] (Abnormal)  Resulted: 10/24/18 0202, Result status: Final result    Ordering provider: Imer Reyes MD  10/24/18 0150 Resulting lab: POINT OF CARE TEST, GLUCOSE    Specimen Information    Type Source Collected On     10/24/18 0150          Components       Value Reference Range Flag Lab   Glucose 142 70 - 99 mg/dL H 170            Glucose by meter [[id number]] (Abnormal)  Resulted: 10/23/18 2123, Result status: Final result    Ordering provider: Imer Reyes MD  10/23/18 2109 Resulting lab: POINT OF CARE TEST, GLUCOSE    Specimen Information     Type Source Collected On     10/23/18 2109          Components       Value Reference Range Flag Lab   Glucose 197 70 - 99 mg/dL H 170            Sodium [[id number]]  Resulted: 10/23/18 1758, Result status: Final result    Ordering provider: Ervin Lares MD  10/23/18 1014 Resulting lab: [facility]    Specimen Information    Type Source Collected On   Blood  10/23/18 1640          Components       Value Reference Range Flag Lab   Sodium 135 133 - 144 mmol/L  FrStHsLb            Creatinine [[id number]] (Abnormal)  Resulted: 10/23/18 1758, Result status: Final result    Ordering provider: Ervin Lares MD  10/23/18 1014 Resulting lab: [facility]    Specimen Information    Type Source Collected On   Blood  10/23/18 1640          Components       Value Reference Range Flag Lab   Creatinine 2.94 0.52 - 1.04 mg/dL H FrStHsLb   GFR Estimate 15 >60 mL/min/1.7m2 L FrStHsLb   Comment:  Non  GFR Calc   GFR Estimate If Black 19 >60 mL/min/1.7m2 L FrStHsLb   Comment:   GFR Calc            Hemoglobin [[id number]] (Abnormal)  Resulted: 10/23/18 1745, Result status: Final result    Ordering provider: Ervin Lares MD  10/23/18 1014 Resulting lab: [facility]    Specimen Information    Type Source Collected On   Blood  10/23/18 1640          Components       Value Reference Range Flag Lab   Hemoglobin 10.3 11.7 - 15.7 g/dL L FrStHsLb            Glucose by meter [[id number]] (Abnormal)  Resulted: 10/23/18 1711, Result status: Final result    Ordering provider: Imer Reyes MD  10/23/18 1659 Resulting lab: POINT OF CARE TEST, GLUCOSE    Specimen Information    Type Source Collected On     10/23/18 1659          Components       Value Reference Range Flag Lab   Glucose 154 70 - 99 mg/dL H 170            Gram stain [[id number]]  Resulted: 10/23/18 1553, Result status: Final result    Ordering provider: Imer Reyes MD  10/20/18 0133 Resulting  lab: [facility]    Specimen Information    Type Source Collected On   Sputum  10/23/18 0911          Components       Value Reference Range Flag Lab   Specimen Description Sputum      Special Requests Screen   75   Gram Stain <10 Squamous epithelial cells/low power field   225   Gram Stain >25 PMNs/low power field   225   Gram Stain --   225   Result:         Rare  Mixed gram negative and positive pal              Glucose by meter [[id number]] (Abnormal)  Resulted: 10/23/18 1221, Result status: Final result    Ordering provider: Imer Reyes MD  10/23/18 1209 Resulting lab: POINT OF CARE TEST, GLUCOSE    Specimen Information    Type Source Collected On     10/23/18 1209          Components       Value Reference Range Flag Lab   Glucose 207 70 - 99 mg/dL H 170            Basic metabolic panel [[id number]] (Abnormal)  Resulted: 10/23/18 0807, Result status: Final result    Ordering provider: Ervin Lares MD  10/23/18 0001 Resulting lab: [facility]    Specimen Information    Type Source Collected On   Blood  10/23/18 0722          Components       Value Reference Range Flag Lab   Sodium 132 133 - 144 mmol/L L FrStHsLb   Potassium 5.0 3.4 - 5.3 mmol/L  FrStHsLb   Chloride 101 94 - 109 mmol/L  FrStHsLb   Carbon Dioxide 26 20 - 32 mmol/L  FrStHsLb   Anion Gap 5 3 - 14 mmol/L  FrStHsLb   Glucose 130 70 - 99 mg/dL H FrStHsLb   Urea Nitrogen 57 7 - 30 mg/dL H FrStHsLb   Creatinine 3.03 0.52 - 1.04 mg/dL H FrStHsLb   GFR Estimate 15 >60 mL/min/1.7m2 L FrStHsLb   Comment:  Non  GFR Calc   GFR Estimate If Black 18 >60 mL/min/1.7m2 L FrStHsLb   Comment:  African American GFR Calc   Calcium 7.9 8.5 - 10.1 mg/dL L FrStHsLb            Hepatic panel [[id number]] (Abnormal)  Resulted: 10/23/18 0807, Result status: Final result    Ordering provider: Ervin Lares MD  10/23/18 0001 Resulting lab: [facility]    Specimen Information    Type Source  Collected On   Blood  10/23/18 0722          Components       Value Reference Range Flag Lab   Bilirubin Direct 0.4 0.0 - 0.2 mg/dL H FrStHsLb   Bilirubin Total 0.8 0.2 - 1.3 mg/dL  FrStHsLb   Albumin 2.4 3.4 - 5.0 g/dL L FrStHsLb   Protein Total 5.7 6.8 - 8.8 g/dL L FrStHsLb   Alkaline Phosphatase 105 40 - 150 U/L  FrStHsLb    0 - 50 U/L H FrStHsLb    0 - 45 U/L H FrStHsLb            Glucose by meter [[id number]] (Abnormal)  Resulted: 10/23/18 0247, Result status: Final result    Ordering provider: Imer Reyes MD  10/23/18 0150 Resulting lab: POINT OF CARE TEST, GLUCOSE    Specimen Information    Type Source Collected On     10/23/18 0150          Components       Value Reference Range Flag Lab   Glucose 232 70 - 99 mg/dL H 170            Fractional excretion of sodium [[id number]]  Resulted: 10/22/18 2348, Result status: Final result    Ordering provider: Ervin Lares MD  10/22/18 1520 Resulting lab: [facility]    Specimen Information    Type Source Collected On   Urine Urine catheter 10/22/18 2300          Components       Value Reference Range Flag Lab   Creatinine Urine 151 mg/dL  FrStHsLb   Sodium Urine mmol/L 11 mmol/L  FrStHsLb   %FENA 0.2 %  FrStHsLb   Comment:         Adult:    <1 percent            Indicates prerenal azotemia            >3 percent            Suggests acute tubular            necrosis  Neonates: <2.5 percent            Suggest prerenal azotemia            >2.5 percent            Suggest acute tubular necrosis              Sodium random urine [[id number]]  Resulted: 10/22/18 2340, Result status: Final result    Ordering provider: Ervin Lares MD  10/22/18 1520 Resulting lab: [facility]    Specimen Information    Type Source Collected On   Urine Urine catheter 10/22/18 2300          Components       Value Reference Range Flag Lab   Sodium Urine mmol/L 10 mmol/L  FrStHsLb            Creatinine random urine [[id number]]  Resulted: 10/22/18  2340, Result status: Final result    Ordering provider: Ervin Lares MD  10/22/18 1520 Resulting lab: [facility]    Specimen Information    Type Source Collected On   Urine Urine catheter 10/22/18 2300          Components       Value Reference Range Flag Lab   Creatinine Urine Random 152 mg/dL  FrStHsLb            UA with Microscopic reflex to Culture [[id number]] (Abnormal)  Resulted: 10/22/18 2335, Result status: Final result    Ordering provider: Ervin Lares MD  10/22/18 1503 Resulting lab: [facility]    Specimen Information    Type Source Collected On   Catheterized Urine Urine catheter 10/22/18 2300          Components       Value Reference Range Flag Lab   Color Urine Yellow   FrStHsLb   Appearance Urine Clear   FrStHsLb   Glucose Urine Negative NEG^Negative mg/dL  FrStHsLb   Bilirubin Urine Negative NEG^Negative  FrStHsLb   Ketones Urine Negative NEG^Negative mg/dL  FrStHsLb   Specific [place] Urine 1.018 1.003 - 1.035  FrStHsLb   Blood Urine Small NEG^Negative A FrStHsLb   pH Urine 5.0 5.0 - 7.0 pH  FrStHsLb   Protein Albumin Urine 30 NEG^Negative mg/dL A FrStHsLb   Urobilinogen mg/dL Normal 0.0 - 2.0 mg/dL  FrStHsLb   Nitrite Urine Negative NEG^Negative  FrStHsLb   Leukocyte Esterase Urine Moderate NEG^Negative A FrStHsLb   Source Catheterized Urine   FrStHsLb   WBC Urine 9 0 - 5 /HPF H FrStHsLb   RBC Urine 5 0 - 2 /HPF H FrStHsLb   Squamous Epithelial /HPF Urine <1 0 - 1 /HPF  FrStHsLb   Mucous Urine Present NEG^Negative /LPF A FrStHsLb            Testing Performed By     Lab - Abbreviation Name Director Address Valid Date Range    14 - FrStHsLb [facility] Unknown [address] [place] [address] 05/08/15 1057 - Present    75 - Unknown [facility] Unknown [address] [facility] [address] 01/15/15 1019 - Present    170 - Unknown POINT OF CARE TEST, GLUCOSE Unknown Unknown 10/31/11 1114 - Present    225 - Unknown  [facility] Unknown [address] [facility] [address] 12/19/14 0954 - Present            Unresulted Labs (24h ago through future)    Start       Ordered    10/29/18 0600  Creatinine  EVERY THREE DAYS,   Routine      10/26/18 1031    10/26/18 0600  Platelet count  EVERY THREE DAYS,   Routine     Comments:  If no result is listed, this lab has not been done the past 365 days. LATEST LAB RESULT: Platelet Count (10e9/L)       Date                     Value                 10/25/2018               172              ----------    10/25/18 1548    Unscheduled  Hemoglobin  CONDITIONAL X 2,   Routine     Comments:  IF morning Hemoglobin result is LESS than 8.0 on POD 1 and/or POD 2, release order and recheck Hemoglobin in the evening at 1700.  Notify Provider if second Hemoglobin result is LESS than 7.5.    10/20/18 1239         Imaging Results - 3 Days      XR Chest 2 Views [[id number]]  Resulted: 10/25/18 1832, Result status: Final result    Ordering provider: Ervin Lares MD  10/25/18 1514 Resulted by: Mable Kay MD    Performed: 10/25/18 1804 - 10/25/18 1809 Resulting lab: RADIOLOGY RESULTS    Narrative:       CHEST TWO VIEWS 10/25/2018 6:09 PM     HISTORY:  Assess for infiltrate.      COMPARISON: October 19, 2018.     FINDINGS: Patchy bilateral infiltrates. Stable cardiac device. The  cardiac silhouette is prominent but stable. Pulmonary vasculature is  unremarkable.      Impression:       IMPRESSION: Patchy bilateral infiltrates.      MABLE KAY MD      Testing Performed By     Lab - Abbreviation Name Director Address Valid Date Range    104 - Rad lts RADIOLOGY RESULTS Unknown Unknown 02/16/05 1553 - Present            Encounter-Level Documents:     There are no encounter-level documents.      Order-Level Documents:     There are no order-level documents.

## 2018-10-20 PROBLEM — S82.92XA LEG FRACTURE, LEFT: Status: ACTIVE | Noted: 2018-01-01

## 2018-10-20 NOTE — PLAN OF CARE
Problem: Patient Care Overview  Goal: Plan of Care/Patient Progress Review  Outcome: Improving  Pt lethargic beginning of the shift, more awake towards the end. A/O. Denies pain. Bolus given for low blood pressure. [place] present. Pt on tele. Feet cool, 1+ pulses. Pt states baseline neuropathy.

## 2018-10-20 NOTE — ANESTHESIA PROCEDURE NOTES
ARTERIAL LINE PROCEDURE NOTE:   Pre-Procedure  Performed by YAAKOV SMITH  Location: pre-op      Pre-Anesthestic Checklist: patient identified, risks and benefits discussed, informed consent, pre-op evaluation and at physician/surgeon's request    Timeout  Correct Patient: Yes   Correct Procedure: Yes   Correct Site: Yes         .   Procedure Documentation  Procedure: arterial line    Supine  Insertion Site:right, radial.Skin infiltrated with 2 mL of 1% lidocaine. Injection technique: ultrasound guided, Seldinger Technique and Rodrigo's test completed  .  .  Patient Prep;all elements of maximal sterile barrier technique followed, mask, hat, sterile gown, sterile gloves, draped, hand hygiene, chlorhexidine gluconate and isopropyl alcohol, patient draped    Assessment/Narrative    Catheter: 20 gauge, 12 cm     Secured by suture  Tegaderm dressing used.    Arterial waveform: Yes     Comments:  Times one.  Good blood flow.  No complications.

## 2018-10-20 NOTE — BRIEF OP NOTE
[facility] Brief Operative Note    Pre-operative diagnosis: LEFT HIP FEMORAL NECK FRACTURE   Post-operative diagnosis LEFT HIP FEMORAL NECK FRACTURE   Procedure: Procedure(s):  LEFT HIP HERACLIO-ARTHROPLASTY    Surgeon(s): Surgeon(s) and Role:     * Ish Fish MD - Primary     * Milka Anne PA-C - Assisting   Estimated blood loss: 250 mL    Specimens: * No specimens in log *   Findings: SEE OPERATIVE DICTATION    - wbat  - pt/ot/sw  - pain control  - dvt prophylaxis, ok to restart tressa tomorrow

## 2018-10-20 NOTE — ANESTHESIA CARE TRANSFER NOTE
Patient: Helene Gibbs    Procedure(s):  LEFT HIP HERACLIO-ARTHROPLASTY     Diagnosis: LEFT HIP FEMORAL NECK FRACTURE  Diagnosis Additional Information: No value filed.    Anesthesia Type:   General, ETT     Note:  Airway :Face Mask  Patient transferred to:PACU  Comments: Comfortable, [place] gtt. On at 0.2Handoff Report: Identifed the Patient, Identified the Reponsible Provider, Reviewed the pertinent medical history, Discussed the surgical course, Reviewed Intra-OP anesthesia mangement and issues during anesthesia, Set expectations for post-procedure period and Allowed opportunity for questions and acknowledgement of understanding      Vitals: (Last set prior to Anesthesia Care Transfer)    CRNA VITALS  10/20/2018 0935 - 10/20/2018 1015      10/20/2018             ART BP: 109/53    ART Mean: 71    Resp Rate (observed): (!)  1    Resp Rate (set): 10                Electronically Signed By: ALLEN Durham CRNA  October 20, 2018  10:15 AM

## 2018-10-20 NOTE — PROVIDER NOTIFICATION
Prescriber Notification Note    The pharmacist has communicated with this patient's provider regarding a concern or therapy recommendation.    Notified Person: Ian  Date/Time of Notification: 10/20/18 4890  Interaction: phone  Concern/Recommendation:discussed clindamycin post op and for dental abscess     Comments/Additional Details:clinda 900 mg IV q8h x 2 doses post op, then 300 mg po tid thereafter

## 2018-10-20 NOTE — CONSULTS
[facility] Orthopedic Consultation    Helene Gibbs MRN# [id number]   Age: 83 year old YOB: 1935     Date of Admission:  10/19/2018    Reason for consult: Femoral neck fracture       Requesting physician: Reyes       Level of consult: Consult, follow and place orders           Assessment and Plan:   Assessment:   Femoral neck fracture        Plan:   Left hip hemiarthroplasty           Chief Complaint:   Femoral neck fracture         History of Present Illness:   This patient is a 83 year old female who presents with the following condition requiring a hospital admission:    Hip fracture:    She presents with history of a fall at home resulting in inability to walk and groin pain on the left. This occurred yesterday. She lost her balance and fell onto her left hip. She also hit her head. Unable to bear weight afterwards. Brought to ED and found to have a displaced femoral neck fracture. Admitted for medical optimization and ortho consult. She is on eloquist, last dose yesterday morning. She uses a walker for ambulation. No other injuries. She is currently on clinda for a dental abscess.             Past Medical History:     Past Medical History:   Diagnosis Date     Anemia      Ankle arthritis      Arthritis      Back pain      Bell's palsy 9/08    left     Breast CA (H) 2004-    left lumpectomy, radiation, -recurrence     CKD (chronic kidney disease)     stage 3 baseline Cr 1.3     Colon polyps     colonoscopy-9/12-next due in 9/13     Congestive heart failure (H)      COPD (chronic obstructive pulmonary disease) (H)      Coronary artery disease      DM (diabetes mellitus) (H)      GERD (gastroesophageal reflux disease)      Hyperlipidemia      Hypertension      Lumbar spinal stenosis     use cane     Nicotine dependence      Obesity      Osteoporosis      Other chronic pain      Pacemaker      Permanent atrial fibrillation (H) 8/2008    S/P AV node ablation and pacemaker 10-25-12        Pleural effusion      Pulmonary hypertension (H)      PVD (peripheral vascular disease) (H)      Rectal stenosis      Tobacco abuse     current     Tricuspid regurgitation              Past Surgical History:     Past Surgical History:   Procedure Laterality Date     ARTHROSCOPY SHOULDER RT/LT  1999, 2004    Bilateral, right then left     BONE MARROW BIOPSY, BONE SPECIMEN, NEEDLE/TROCAR N/A 8/29/2017    Procedure: BIOPSY BONE MARROW;  BONE MARROW BIOPSY;  Surgeon: Marino Cohen MD;  Location:  GI     C DEXA, BONE DENSITY, AXIAL SKEL       C MAMMOGRAM, SCREENING  1/2009     COLONOSCOPY N/A 10/7/2016    Procedure: COMBINED COLONOSCOPY, SINGLE OR MULTIPLE BIOPSY/POLYPECTOMY BY BIOPSY;  Surgeon: Cassandra Mccord MD;  Location:  GI     ESOPHAGOSCOPY, GASTROSCOPY, DUODENOSCOPY (EGD), COMBINED N/A 8/10/2017    Procedure: COMBINED ESOPHAGOSCOPY, GASTROSCOPY, DUODENOSCOPY (EGD), BIOPSY SINGLE OR MULTIPLE;  gastroscopy;  Surgeon: Helene Bustamante MD;  Location:  GI     H ABLATION AV NODE  10/2012     HC COLONOSCOPY THRU STOMA, DIAGNOSTIC  ? 2005     IMPLANT PACEMAKER  10/2012    [place] [id number], [id number]     JOINT REPLACEMTN, KNEE RT/LT      Joint Replacement knee bilateral     LAPAROSCOPIC CHOLECYSTECTOMY WITH CHOLANGIOGRAMS N/A 12/14/2015    Procedure: LAPAROSCOPIC CHOLECYSTECTOMY WITH CHOLANGIOGRAMS;  Surgeon: Ervin Amos MD;  Location:  OR     LUMPECTOMY BREAST      Left-2004     PHACOEMULSIFICATION CLEAR CORNEA WITH STANDARD INTRAOCULAR LENS IMPLANT Left 7/16/2015    Procedure: PHACOEMULSIFICATION CLEAR CORNEA WITH STANDARD INTRAOCULAR LENS IMPLANT;  Surgeon: Sai Obregon MD;  Location:  EC     PHACOEMULSIFICATION CLEAR CORNEA WITH TORIC INTRAOCULAR LENS IMPLANT Right 5/9/2017    Procedure: PHACOEMULSIFICATION CLEAR CORNEA WITH TORIC INTRAOCULAR LENS IMPLANT;  RIGHT EYE PHACOEMULSIFICATION CLEAR CORNEA WITH TORIC INTRAOCULAR LENS IMPLANT ;  Surgeon: Duc Richmond MD;   Location: [facility]             Social History:     Social History   Substance Use Topics     Smoking status: Former Smoker     Packs/day: 0.25     Years: 45.00     Types: Cigarettes     Smokeless tobacco: Never Used     Alcohol use No             Family History:     Family History   Problem Relation Age of Onset     Hypertension Mother      Diabetes Mother       at 83 yrs     C.A.D. Father       age 70s     Breast Cancer No family hx of      Colon Cancer No family hx of              Immunizations:     VACCINE/DOSE   Diptheria   DPT   DTAP   HBIG   Hepatitis A   Hepatitis B   HIB   Influenza   Measles   Meningococcal   MMR   Mumps   Pneumococcal   Polio   Rubella   Small Pox   TDAP   Varicella   Zoster             Allergies:     Allergies   Allergen Reactions     Ambien [Zolpidem Tartrate] Visual Disturbance     Hallucinations and fell out of bed at night.     Penicillins Hives     Definity      Caused a syncopal episode.     Sulfa Drugs Itching     Cymbalta Rash     Fluconazole Rash             Medications:     Current Facility-Administered Medications   Medication     [Auto Hold] acetaminophen (TYLENOL) tablet 650 mg     [Auto Hold] albuterol neb solution 2.5 mg     [Auto Hold] aspirin EC tablet 81 mg     [Auto Hold] bisacodyl (DULCOLAX) Suppository 10 mg     [Auto Hold] clindamycin (CLEOCIN) infusion 900 mg     clindamycin (CLEOCIN) infusion 900 mg     clindamycin (CLEOCIN) infusion 900 mg     [Auto Hold] glucose gel 15-30 g    Or     [Auto Hold] dextrose 50 % injection 25-50 mL    Or     [Auto Hold] glucagon injection 1 mg     [Auto Hold] fluticasone (FLONASE) 50 MCG/ACT spray 2 spray     [Auto Hold] HYDROmorphone (PF) (DILAUDID) injection 0.3-0.5 mg     [Auto Hold] insulin aspart (NovoLOG) inj (RAPID ACTING)     [Auto Hold] ipratropium - albuterol 0.5 mg/2.5 mg/3 mL (DUONEB) neb solution 3 mL     lactated ringers infusion     [Auto Hold] lidocaine (LMX4) cream     [Auto Hold] lidocaine 1 % 1 mL      lidocaine 1 % 1 mL     [Auto Hold] melatonin tablet 1 mg     [Auto Hold] metoprolol tartrate (LOPRESSOR) tablet 25 mg     [Auto Hold] naloxone (NARCAN) injection 0.1-0.4 mg     [Auto Hold] ondansetron (ZOFRAN-ODT) ODT tab 4 mg    Or     [Auto Hold] ondansetron (ZOFRAN) injection 4 mg     [Auto Hold] oxyCODONE IR (ROXICODONE) tablet 5-10 mg     [Auto Hold] polyethylene glycol (MIRALAX/GLYCOLAX) Packet 17 g     [Auto Hold] pregabalin (LYRICA) capsule 75 mg     [Auto Hold] prochlorperazine (COMPAZINE) injection 5 mg    Or     [Auto Hold] prochlorperazine (COMPAZINE) tablet 5 mg    Or     [Auto Hold] prochlorperazine (COMPAZINE) Suppository 12.5 mg     [Auto Hold] senna-docusate (SENOKOT-S;PERICOLACE) 8.6-50 MG per tablet 1 tablet    Or     [Auto Hold] senna-docusate (SENOKOT-S;PERICOLACE) 8.6-50 MG per tablet 2 tablet     [Auto Hold] simvastatin (ZOCOR) tablet 10 mg     [Auto Hold] sodium chloride (PF) 0.9% PF flush 3 mL     [Auto Hold] sodium chloride (PF) 0.9% PF flush 3 mL     sodium chloride 0.9% infusion     tranexamic acid (CYKLOKAPRON) 1 g in sodium chloride 0.9 % 60 mL bolus     tranexamic acid (CYKLOKAPRON) 1 g in sodium chloride 0.9 % 60 mL bolus            Review of Systems:   All review of systems was performed and was negative except as per hpi            Physical Exam:   All vitals have been reviewed  Patient Vitals for the past 24 hrs:   BP Temp Temp src Pulse Heart Rate Resp SpO2 Height Weight   10/20/18 0700 107/64 - - - 70 14 95 % - -   10/20/18 0657 107/54 - - 70 - - - - -   10/20/18 0523 - - - - - - - - 72.6 kg (160 lb)   10/20/18 0100 144/82 98  F (36.7  C) Oral 70 - 16 96 % - -   10/20/18 0040 143/69 - - - - - - - -   10/20/18 0020 (!) 142/106 - - - - - - - -   10/20/18 0009 - - - - - - 98 % - -   10/20/18 0000 149/90 - - - - - 99 % - -   10/19/18 2340 159/86 - - - - - 98 % - -   10/19/18 2300 - - - 73 - - - - -   10/19/18 2259 - - - - - - 98 % - -   10/19/18 2258 138/85 - - - - - - - -   10/19/18  "2203 125/57 97.6  F (36.4  C) Oral 73 - 16 92 % 1.651 m (5' 5\") 72.6 kg (160 lb)   10/19/18 2200 - - - 70 - - 98 % - -   10/19/18 2126 - - - 65 - - (!) 88 % - -   10/19/18 2125 - - - 70 - - (!) 89 % - -   10/19/18 2122 125/57 - - 69 - 18 (!) 84 % - -       Intake/Output Summary (Last 24 hours) at 10/20/18 0722  Last data filed at 10/20/18 0500   Gross per 24 hour   Intake                0 ml   Output              400 ml   Net             -400 ml     NAD  nonlabored breathing  No peripheral edema  Skin intact  White sclera  LLE:  +pain with logroll  +Df/PF/EHL  Sensation intact throughout  Skin intact            Data:   All laboratory data reviewed  Results for orders placed or performed during the hospital encounter of 10/19/18   XR Pelvis w Hip Left 1 View    Narrative    PELVIS WITH LEFT HIP LATERAL TWO VIEWS  10/19/2018 10:31 PM     HISTORY:  Pain.     COMPARISON: 7/18/2018.      Impression    IMPRESSION:  There is a displaced and angulated acute appearing  fracture of the left femoral neck. No other areas suspicious for acute  fracture are identified. Diffuse osteopenia. Arterial calcification.  Left femoral artery stent. Moderate amount of gas and stool within  loops of colon are noted in the pelvis.    BARAK IZQUIERDO MD   Head CT w/o contrast    Narrative    CT SCAN OF THE HEAD WITHOUT CONTRAST   10/19/2018 10:12 PM     HISTORY: fall, hit head on eliquis;     TECHNIQUE:  Axial images of the head and coronal reformations without  IV contrast material. Radiation dose for this scan was reduced using  automated exposure control, adjustment of the mA and/or kV according  to patient size, or iterative reconstruction technique.    COMPARISON: None.    FINDINGS:  There is diffuse parenchymal volume loss.  White matter  changes are present in the cerebral hemispheres that are consistent  with small vessel ischemic disease in this age patient. There is no  evidence of intracranial hemorrhage, mass, acute infarct or " anomaly.  The visualized portions of the sinuses and mastoids appear normal.  There is no evidence of trauma.      Impression    IMPRESSION: No intracranial hemorrhage or skull fractures are  identified.      JOHNATHAN LOWRY MD   XR Chest 1 View    Narrative    CHEST ONE VIEW  10/19/2018 10:33 PM     HISTORY:  Pain.     COMPARISON: 7/21/2018.      Impression    IMPRESSION: Dual-lead cardiac device left chest wall, with lead tips  unchanged in position. There is likely a small left pleural effusion,  as well as mild associated infiltrate and/or atelectasis at the left  lung base laterally. The lungs are otherwise clear. Heart size appears  stable. Pulmonary vascularity is within normal limits. Aortic  calcification.    BARAK IZQUIERDO MD   CBC with platelets differential   Result Value Ref Range    WBC 9.7 4.0 - 11.0 10e9/L    RBC Count 3.20 (L) 3.8 - 5.2 10e12/L    Hemoglobin 9.3 (L) 11.7 - 15.7 g/dL    Hematocrit 29.6 (L) 35.0 - 47.0 %    MCV 93 78 - 100 fl    MCH 29.1 26.5 - 33.0 pg    MCHC 31.4 (L) 31.5 - 36.5 g/dL    RDW 15.2 (H) 10.0 - 15.0 %    Platelet Count 260 150 - 450 10e9/L    Diff Method Automated Method     % Neutrophils 75.9 %    % Lymphocytes 8.1 %    % Monocytes 14.7 %    % Eosinophils 0.8 %    % Basophils 0.2 %    % Immature Granulocytes 0.3 %    Nucleated RBCs 0 0 /100    Absolute Neutrophil 7.3 1.6 - 8.3 10e9/L    Absolute Lymphocytes 0.8 0.8 - 5.3 10e9/L    Absolute Monocytes 1.4 (H) 0.0 - 1.3 10e9/L    Absolute Eosinophils 0.1 0.0 - 0.7 10e9/L    Absolute Basophils 0.0 0.0 - 0.2 10e9/L    Abs Immature Granulocytes 0.0 0 - 0.4 10e9/L    Absolute Nucleated RBC 0.0    INR   Result Value Ref Range    INR 1.54 (H) 0.86 - 1.14   Partial thromboplastin time   Result Value Ref Range    PTT 31 22 - 37 sec   Comprehensive metabolic panel   Result Value Ref Range    Sodium 135 133 - 144 mmol/L    Potassium 4.4 3.4 - 5.3 mmol/L    Chloride 96 94 - 109 mmol/L    Carbon Dioxide 30 20 - 32 mmol/L    Anion Gap 9 3  - 14 mmol/L    Glucose 178 (H) 70 - 99 mg/dL    Urea Nitrogen 43 (H) 7 - 30 mg/dL    Creatinine 1.80 (H) 0.52 - 1.04 mg/dL    GFR Estimate 27 (L) >60 mL/min/1.7m2    GFR Estimate If Black 33 (L) >60 mL/min/1.7m2    Calcium 8.6 8.5 - 10.1 mg/dL    Bilirubin Total 0.7 0.2 - 1.3 mg/dL    Albumin 3.1 (L) 3.4 - 5.0 g/dL    Protein Total 6.8 6.8 - 8.8 g/dL    Alkaline Phosphatase 98 40 - 150 U/L     (H) 0 - 50 U/L     (H) 0 - 45 U/L   UA with Microscopic reflex to Culture   Result Value Ref Range    Color Urine Yellow     Appearance Urine Slightly Cloudy     Glucose Urine Negative NEG^Negative mg/dL    Bilirubin Urine Negative NEG^Negative    Ketones Urine Negative NEG^Negative mg/dL    Specific Gravity Urine 1.012 1.003 - 1.035    Blood Urine Negative NEG^Negative    pH Urine 7.0 5.0 - 7.0 pH    Protein Albumin Urine 10 (A) NEG^Negative mg/dL    Urobilinogen mg/dL Normal 0.0 - 2.0 mg/dL    Nitrite Urine Negative NEG^Negative    Leukocyte Esterase Urine Moderate (A) NEG^Negative    Source Catheterized Urine     WBC Urine 8 (H) 0 - 5 /HPF    RBC Urine 0 0 - 2 /HPF    Squamous Epithelial /HPF Urine 13 (H) 0 - 1 /HPF    Hyaline Casts 18 (H) 0 - 2 /LPF   Hemoglobin A1c   Result Value Ref Range    Hemoglobin A1C 5.5 0 - 5.6 %   Glucose by meter   Result Value Ref Range    Glucose 141 (H) 70 - 99 mg/dL   EKG 12-lead, tracing only   Result Value Ref Range    Interpretation ECG Click View Image link to view waveform and result    ABO/Rh type and screen   Result Value Ref Range    ABO O     RH(D) Pos     Antibody Screen Neg     Test Valid Only At [facility]        Specimen Expires 10/22/2018    Urine Culture Aerobic Bacterial   Result Value Ref Range    Specimen Description Catheterized Urine     Special Requests Specimen received in preservative     Culture Micro PENDING      *Note: Due to a large number of results and/or encounters for the requested time period, some results have not been  displayed. A complete set of results can be found in Results Review.          Attestation:  Amount of time performed on this consult: 15 minutes.    Ish Fish MD

## 2018-10-20 NOTE — ED PROVIDER NOTES
History     Chief Complaint:  Fall     HPI   Helene Gibbs is a 83 year old female on Eliquis with a medical history of chronic kidney disease, hypertension, hyperlipidemia, congestive heart failure, COPD, diabetes, and atrial fibrillation who presents with fall. The patient reports she was preparing her medications until she lost her balance and fell backwards in the kitchen, and hit the left side of her head on the refrigerator. She did not lose consciousness. She also states she twisted her left leg and is currently experiencing pain into her left thigh. The patient was unable to get up from the ground. She used a telephone and called EMS. Patient also notes she hasn't felt well over the last few days and found out she had a tooth infection yesterday. Denies neck pain, chest pain, abdominal pain, fevers, chills, diarrhea, and cough. Patient typically ambulates with a walker. She currently lives in a senior assisted living. She is not oxygen dependent.    Allergies:  Ambien  Penicillins  Definity  Sulfa drugs  Cymbalta  Fluconazole      Medications:    albuterol (PROAIR HFA, PROVENTIL HFA, VENTOLIN HFA) 108 (90 BASE) MCG/ACT inhaler  apixaban ANTICOAGULANT (ELIQUIS) 2.5 MG tablet  aspirin EC 81 MG EC tablet  diazepam 10 mg SUPP  fluticasone (FLONASE) 50 MCG/ACT spray  FUROSEMIDE PO  GlipiZIDE (GLUCOTROL PO)  hydrOXYzine (VISTARIL) 25 MG capsule  lisinopril (PRINIVIL/ZESTRIL) 10 MG tablet  metoprolol tartrate (LOPRESSOR) 25 MG tablet  pregabalin (LYRICA) 75 MG capsule  ranitidine (ZANTAC) 150 MG tablet  simvastatin (ZOCOR) 10 MG tablet  tiotropium (SPIRIVA) 18 MCG capsule    Past Medical History:     Anxiety  Arthritis  Basal cell carcinoma of skin   Breast CA   CKD  Chronic pain syndrome  Colon polyps   Congestive heart failure (H)   COPD  Coronary artery disease   Depression  Diabetes   Gastrointestinal hemorrhage  GERD  Hyperlipidemia   Hypertension   Lumbar spinal stenosis   Nicotine  "dependence   NSTEMI  Obesity   Osteoporosis   Pacemaker   Permanent atrial fibrillation   PVD  Rectal stenosis   Tobacco abuse   Tubular adenoma  Tricuspid regurgitation     Past Surgical History:    Arthroscopy shoulder   H ablation AV node  Implant pacemaker  Joint replacement  Laparoscopic cholecystectomy with cholangiograms  Lumpectomy breast  Phacoemulsification clear cornea with standard intraocular lends implant   Phacoemulsification clear cornea with toric intraocular lends implant     Family History:    Hypertension  Diabetes  CAD    Social History:  Smoking status: Former smoker  Alcohol use: No   Marital Status:   [5]  PCP: Neisha Esparza      Review of Systems   Constitutional: Negative for chills and fever.   Respiratory: Negative for cough.    Cardiovascular: Negative for chest pain.   Gastrointestinal: Negative for abdominal pain and diarrhea.   Musculoskeletal: Positive for arthralgias. Negative for neck pain.   Neurological: Negative for syncope.   All other systems reviewed and are negative.    Physical Exam   Patient Vitals for the past 24 hrs:   BP Temp Temp src Pulse Resp SpO2 Height Weight   10/20/18 0020 (!) 142/106 - - - - - - -   10/20/18 0009 - - - - - 98 % - -   10/20/18 0000 149/90 - - - - 99 % - -   10/19/18 2340 159/86 - - - - 98 % - -   10/19/18 2300 - - - 73 - - - -   10/19/18 2259 - - - - - 98 % - -   10/19/18 2258 138/85 - - - - - - -   10/19/18 2203 125/57 97.6  F (36.4  C) Oral 73 16 92 % 1.651 m (5' 5\") 72.6 kg (160 lb)   10/19/18 2200 - - - 70 - 98 % - -   10/19/18 2126 - - - 65 - (!) 88 % - -   10/19/18 2125 - - - 70 - (!) 89 % - -   10/19/18 2122 125/57 - - 69 18 (!) 84 % - -      Physical Exam  Constitutional: Elderly white female supine.   HENT: No tenderness of swelling.  Eyes: EOM are normal. Pupils are equal, round, and reactive to light.   Neck: Normal range of motion. No JVD present. No tracheal deviation present. No cervical adenopathy. No posterior midline " tenderness.   Cardiovascular: Regular rhythm.  Exam reveals no gallop and no friction rub.  1/6 systolic murmer LUSB. Pacemaker left chest.  Pulmonary/Chest: Coarse rales, lower lobes posteriorly.   Abdominal: Soft. No tenderness. No rebound or guarding.   Musculoskeletal: No tenderness over thoracic or lumbar spine. Left leg shortened and externally rotated. Tenderness in the intertrochanteric region. Pain with flexion or range of motion.   Lymphadenopathy: No lymphadenopathy.   Neurological: Alert and oriented to person, place, and time. Normal strength. Coordination normal. GCS 15. Fluent speech. No facial asymmetry. Normal sensation.   Skin: Skin is warm and dry. No rash noted. No erythema.      Emergency Department Course   ECG (21:56:01)  Pacemaker.   Interpreted by Mikhail Edmonds MD.   Rate 73 bpm. WA interval *. QRS duration 154. QT/QTc 452/497. P-R-T axes * -72 98.     Imaging:  Radiographic findings were communicated with the patient who voiced understanding of the findings.  Head CT w/o contrast  No intracranial hemorrhage or skull fractures are  Identified.  As read by Radiology.     XR Chest 1 View  Dual-lead cardiac device left chest wall, with lead tips  unchanged in position. There is likely a small left pleural effusion,  as well as mild associated infiltrate and/or atelectasis at the left  lung base laterally. The lungs are otherwise clear. Heart size appears  stable. Pulmonary vascularity is within normal limits. Aortic  Calcification.  As read by Radiology.     XR Pelvis w Hip Left 1 View  There is a displaced and angulated acute appearing  fracture of the left femoral neck. No other areas suspicious for acute  fracture are identified. Diffuse osteopenia. Arterial calcification.  Left femoral artery stent. Moderate amount of gas and stool within  loops of colon are noted in the pelvis.  As read by Radiology.     Laboratory:  CBC: WBC 9.7, HGB 9.3,    CMP: Glucose 178, BUN 43,  Creatinine 1.80, GFR 27, Albumin 3.1, ,   INR: 1.54  PTT: 31  ABO/Rh: O, Rh Positive  UA: Protein albumin 10, Leukocyte esterase Moderate, WBC/HPF 8, Squamous epithelial/HPF 13, Hyaline casts 18  Urine culture: Pending.    Interventions:  2244: Dilaudid 0.5 mg IV  2323: Dilaudid 0.5 mg IV     Emergency Department Course:  The patient arrived in the emergency department via EMS.   Past medical records, nursing notes, and vitals reviewed.  2130: I performed an exam of the patient and obtained history, as documented above.    IV inserted and blood drawn for basic laboratory. Results as noted above.    2156: ECG obtained, findings as noted above.    The patient was sent for a head CT while in the emergency department, findings above.   Patient was given the above interventions while here in the emergency department.   The patient was sent for a chest x-ray and pelvis x-ray while in the emergency department, findings above.   1055: I discussed the case with Dr. Fish of orthopedics.   I discussed the case with Dr. Reyes, hospitalist service who accepts the patient for further care, monitoring, and treatment.      Impression & Plan    Medical Decision Making:  Helene Gibbs is a 83 year old female who slipped and fell, hitting her head and also hurting her left hip. She is on Eliquis and did call for help and was transported. On exam, there is no swelling or tenderness of the head. The neck is ok. She is awake and alert with a GCS of 15. The left hip however is shortened, externally rotated and painful with movement. There are no other abnormal findings. Patient does have a very slight hypoxia, resolved with oxygen with a history of both COPD and heart failure. She also tells me she has not felt well over the last few days and saw the endodontist who told her that she had an infection and that she needed to continue on her Clindamycin. CT of the head is negative. X-ray of the left hip reveals fracture.  Chest x-ray shows small effusion with probable atelectasis. Patient does have mild anemia and mild renal insufficiency as well as some liver elevation. She does not have any abdominal pain to suggest gallbladder disease. Patient has received pain medications. I have spoke with both the hospitalist and orthopedist and she will be admitted for further evaluation, treatment and surgery.    Diagnosis:    ICD-10-CM   1. Fall    2. Closed fracture of left hip, initial encounter (H) S72.002A   3. Closed head trauma, initial encounter S09.90XA    Chronic obstructive pulmonary disease, unspecified COPD type (H) J44.9   4. Anemia, unspecified type D64.9   5. Renal insufficiency N28.9   6. Elevated LFTs R94.5   7. Dental infection K04.7     Disposition:  Admitted to hospital.     Summer Barnes  10/19/2018    EMERGENCY DEPARTMENT  ANTOINE Summer , am serving as a scribe at 9:30 PM on 10/19/2018 to document services personally performed by Mikhail Edmonds MD based on my observations and the provider's statements to me.       Mikhail Edmonds MD  10/20/18 0049

## 2018-10-20 NOTE — PHARMACY-ADMISSION MEDICATION HISTORY
Admission medication history interview status for the 10/19/2018  admission is complete. See EPIC admission navigator for prior to admission medications     Medication history source reliability:Good    Actions taken by pharmacist (provider contacted, etc):None     Additional medication history information not noted on PTA med list :None    Medication reconciliation/reorder completed by provider prior to medication history? Yes    Time spent in this activity: 10    Prior to Admission medications    Medication Sig Last Dose Taking? Auth Provider   albuterol (PROAIR HFA, PROVENTIL HFA, VENTOLIN HFA) 108 (90 BASE) MCG/ACT inhaler Inhale 2 puffs into the lungs every 4 hours as needed for shortness of breath / dyspnea or wheezing Past Month at Unknown time Yes Neisha Esparza MD   apixaban ANTICOAGULANT (ELIQUIS) 2.5 MG tablet Take 1 tablet (2.5 mg) by mouth 2 times daily 10/18/2018 at am Yes Neisha Esparza MD   aspirin EC 81 MG EC tablet Take 1 tablet (81 mg) by mouth daily 10/20/2018 at Unknown time Yes Dolores Mast PA-C   diazepam 10 mg SUPP Insert one suppository vaginally at bedtime as needed Past Month at Unknown time Yes Neisha Esparza MD   fluticasone (FLONASE) 50 MCG/ACT spray Spray 1-2 sprays into both nostrils daily as needed for rhinitis or allergies 10/19/2018 at Unknown time Yes Unknown, Entered By History   FUROSEMIDE PO Take 20 mg by mouth daily 10/19/2018 at Unknown time Yes Unknown, Entered By History   GlipiZIDE (GLUCOTROL PO) Take 5 mg by mouth every morning (before breakfast) 10/19/2018 at Unknown time Yes Reported, Patient   guaiFENesin (MUCINEX) 600 MG 12 hr tablet Take 1 tablet (600 mg) by mouth 2 times daily 10/19/2018 at Unknown time Yes Lucy Collier PA-C   HYDROcodone-acetaminophen (NORCO) 5-325 MG per tablet Take 1 tablet by mouth every 6 hours as needed for pain Past Week at Unknown time Yes Neisha Esparza MD   hydrOXYzine (VISTARIL) 25 MG capsule Take  1 capsule (25 mg) by mouth 2 times daily as needed for itching  Yes Neisha Esparza MD   Lidocaine (LIDOCARE) 4 % Patch Place 3 patches onto the skin daily as needed for moderate pain  Yes Unknown, Entered By History   lisinopril (PRINIVIL/ZESTRIL) 10 MG tablet Take 1 tablet (10 mg) by mouth 2 times daily 10/19/2018 at Unknown time Yes Neisha Esparza MD   magnesium hydroxide (MILK OF MAGNESIA) 400 MG/5ML suspension Take 30 mLs by mouth At Bedtime  10/19/2018 at Unknown time Yes Reported, Patient   metoclopramide (REGLAN) 5 MG tablet Take 1 tablet (5 mg) by mouth 3 times daily as needed  Yes Trigger, Duran Young MD   metoprolol tartrate (LOPRESSOR) 25 MG tablet Take 1 tablet (25 mg) by mouth 2 times daily 10/20/2018 at Unknown time Yes Neisha Esparza MD   ondansetron (ZOFRAN ODT) 4 MG ODT tab Take 1 tablet (4 mg) by mouth every 6 hours as needed for nausea  Yes Neisha Esparza MD   pregabalin (LYRICA) 75 MG capsule Take 1 capsule (75 mg) by mouth 2 times daily 10/19/2018 at Unknown time Yes Neisha Espraza MD   ranitidine (ZANTAC) 150 MG tablet TAKE ONE TABLET BY MOUTH TWO TIMES A DAY 10/19/2018 at Unknown time Yes Neisha Esparza MD   simvastatin (ZOCOR) 10 MG tablet TAKE 1 TABLET (10 MG) BY MOUTH AT BEDTIME 10/19/2018 at Unknown time Yes Neisha Esparza MD   tiotropium (SPIRIVA) 18 MCG capsule Inhale 1 capsule (18 mcg) into the lungs every evening 10/19/2018 at Unknown time Yes Neisha Esparza MD   traMADol (ULTRAM) 50 MG tablet Take 1 tablet (50 mg) by mouth every 6 hours as needed for severe pain Do not take hydrocodone with this 10/19/2018 at Unknown time Yes Neisha Esparza MD   ACCU-CHEK SMARTVIEW test strip USE 1 STRIP BY IN VITRO ROUTE 2 TIMES DAILY OR AS DIRECTED   Neisha Esparza MD

## 2018-10-20 NOTE — PROGRESS NOTES
[facility]    Hospitalist Progress Note      Assessment & Plan    Helene Gibbs is a 83 year old female who presents with a mechanical fall at home found to have a left femoral neck fracture.  Generally feeling unwell for the past week with no clear localizing symptoms.  Recently underwent root canal of left lower bicuspid.  On clindamycin per recommendation of outpatient OMFS.     Right femoral neck fracture:   Postoperative day # 0 left hip hemiarthroplasty     Peripheral vascular disease with left femoral artery stent  Coronary artery disease:   Patient with a troponin elevation as well as abnormal stress imaging 5/7/18.  Note small area of apical ischemia of moderate intensity at that time.  Decision was for ongoing medical management.  Increases perioperative risk.  Continue prior to admission 81 mg daily aspirin  Holding prior to admission apixaban  Continue prior to admission metoprolol XL  Prior to admission lisinopril on hold given acute kidney injury     Atrial fibrillation, permanent on anticoagulation   Status post pacemaker following AV node ablation.  Prior to admission apixaban 2.5 mg twice daily held.  Anticipate operative intervention on hip fracture.    Continue prior to admission metoprolol XL     Dental abscess:   Patient with recent root canal of left lower bicuspid, right upper second molar fractured.  Continue clindamycin, converted to IV (continuation was recommended by outpatient OMFS)      Left lobe atelectasis versus pneumonia:   Patient with generalized weakness and feeling unwell.  Chronic cough which is not significantly changed.  Reportedly had some mild hypoxia in the emergency department which subsequently resolved with IV fluid administration. No fevers or chills.  Will do a pro-calcitonin   Continue clindamycin for dental infection      Acute kidney injury  Stage 3 chronic kidney disease   Patient with a creatinine of 1.8 from baseline of 1.04 well.  Suspect  prerenal, though no recent medication changes and patient without reports of intake change.   Prior to admission lisinopril 10 mg daily held, prior to admission furosemide 20 mg daily held  Trend BMP     Acute on chronic blood loss anemia:   Patient with a history of melenic stool, none recently.  Note hemoglobin of 9.3 today as compared to 14.1 8/9/18.  Over the past year has undergone colonoscopy as well as EGD.  Polyps on colonoscopy in October 2017, EGD unremarkable other than some antral gastritis.  Some concern that ongoing anemia related to GI blood loss in the setting of chronic antico regulation.  Resume H2 blocker  Monitor hemoglobin      Type 2 diabetes:  Prior to admission glipizide 5 mg daily held  Medium dose sliding scale insulin while n.p.o.  glucometers are good today     Liver transaminitis  Patient with ALT and AST elevated to the 300 range.  No clear etiology for this.  Patient has been feeling generally unwell for the past few days.  With acute creatinine elevation, possibly related to volume depletion and possible hypoperfusion.  No abdominal pain.  Follow CMP     History of heart failure preserved ejection fraction  Last TTE in March 2018 with an ejection fraction of 50-55%, severe biatrial enlargement, 2+ tricuspid regurgitation with mild pulmonary hypertension.  Prior to admission Lasix held  Prior to admission lisinopril held  Continue prior to admission metoprolol      COPD  Continue Duonebs  Continue PRN albuterol nebulizer treatments  Holding prior to admission Spiriva while on scheduled DuoNebs    DVT Prophylaxis:   PCD's     Code Status:   DNR/DNI    Disposition:   Expected discharge to transitional care unit when ok with orthopedist     Antonio Sosa MD  Text Page (7am - 6pm)    Interval History   Pain is controlled- she is recently back from the operating room.  No nausea.    -Data reviewed today: I reviewed all new labs and imaging results over the last 24 hours. I  personally reviewed the chest x-ray image(s) showing obscuration of right hemidiaphragm.    Physical Exam   Temp: 98.4  F (36.9  C) Temp src: Oral BP: 118/67 Pulse: 70 Heart Rate: 69 Resp: 9 SpO2: 92 % O2 Device: Nasal cannula Oxygen Delivery: 2 LPM  Vitals:    10/19/18 2203 10/20/18 0523   Weight: 72.6 kg (160 lb) 72.6 kg (160 lb)     Vital Signs with Ranges  Temp:  [97.6  F (36.4  C)-99.1  F (37.3  C)] 98.4  F (36.9  C)  Pulse:  [65-73] 70  Heart Rate:  [68-77] 69  Resp:  [9-30] 9  BP: ()/() 118/67  MAP:  [0 mmHg-81 mmHg] 76 mmHg  Arterial Line BP: (0-127)/(0-58) 125/53  SpO2:  [84 %-100 %] 92 %  I/O last 3 completed shifts:  In: -   Out: 400 [Urine:400]    Constitutional: Awake, alert, cooperative, no apparent distress  Respiratory: Clear to auscultation bilaterally, no crackles or wheezing  Cardiovascular: Regular rate and rhythm, normal S1 and S2, and no murmur noted  GI: decreased bowel sounds, soft, non-distended, non-tender  Skin/Integumen: No rashes, no cyanosis, no edema  Other:     Medications     lactated ringers         acetaminophen  975 mg Oral Q8H     aspirin  81 mg Oral Daily     clindamycin  900 mg Intravenous Q8H     clindamycin  900 mg Intravenous Q8H     fluticasone  2 spray Both Nostrils Daily     insulin aspart  1-6 Units Subcutaneous Q4H     ipratropium - albuterol 0.5 mg/2.5 mg/3 mL  3 mL Nebulization 4x daily     metoprolol tartrate  25 mg Oral BID     pregabalin  75 mg Oral BID     senna-docusate  1 tablet Oral BID    Or     senna-docusate  2 tablet Oral BID     simvastatin  10 mg Oral At Bedtime     sodium chloride (PF)  3 mL Intracatheter Q8H     sodium chloride (PF)  3 mL Intracatheter Q8H       Data     Recent Labs  Lab 10/20/18  1015 10/19/18  2156   WBC  --  9.7   HGB 8.1* 9.3*   MCV  --  93   PLT  --  260   INR  --  1.54*   NA  --  135   POTASSIUM  --  4.4   CHLORIDE  --  96   CO2  --  30   BUN  --  43*   CR  --  1.80*   ANIONGAP  --  9   GERARD  --  8.6   GLC  --  178*    ALBUMIN  --  3.1*   PROTTOTAL  --  6.8   BILITOTAL  --  0.7   ALKPHOS  --  98   ALT  --  255*   AST  --  300*       Recent Results (from the past 24 hour(s))   Head CT w/o contrast    Narrative    CT SCAN OF THE HEAD WITHOUT CONTRAST   10/19/2018 10:12 PM     HISTORY: fall, hit head on eliquis;     TECHNIQUE:  Axial images of the head and coronal reformations without  IV contrast material. Radiation dose for this scan was reduced using  automated exposure control, adjustment of the mA and/or kV according  to patient size, or iterative reconstruction technique.    COMPARISON: None.    FINDINGS:  There is diffuse parenchymal volume loss.  White matter  changes are present in the cerebral hemispheres that are consistent  with small vessel ischemic disease in this age patient. There is no  evidence of intracranial hemorrhage, mass, acute infarct or anomaly.  The visualized portions of the sinuses and mastoids appear normal.  There is no evidence of trauma.      Impression    IMPRESSION: No intracranial hemorrhage or skull fractures are  identified.      JOHNATHAN LOWRY MD   XR Pelvis w Hip Left 1 View    Narrative    PELVIS WITH LEFT HIP LATERAL TWO VIEWS  10/19/2018 10:31 PM     HISTORY:  Pain.     COMPARISON: 7/18/2018.      Impression    IMPRESSION:  There is a displaced and angulated acute appearing  fracture of the left femoral neck. No other areas suspicious for acute  fracture are identified. Diffuse osteopenia. Arterial calcification.  Left femoral artery stent. Moderate amount of gas and stool within  loops of colon are noted in the pelvis.    BARAK IZQUIERDO MD   XR Chest 1 View    Narrative    CHEST ONE VIEW  10/19/2018 10:33 PM     HISTORY:  Pain.     COMPARISON: 7/21/2018.      Impression    IMPRESSION: Dual-lead cardiac device left chest wall, with lead tips  unchanged in position. There is likely a small left pleural effusion,  as well as mild associated infiltrate and/or atelectasis at the left  lung base  laterally. The lungs are otherwise clear. Heart size appears  stable. Pulmonary vascularity is within normal limits. Aortic  calcification.    BARAK IZQUIERDO MD   XR Pelvis w Hip Port Left 1 View    Narrative    PELVIS AND HIP PORTABLE LEFT ONE VIEW 10/20/2018 12:00 PM     HISTORY: Postoperative hip arthroplasty.       Impression    IMPRESSION: Left hip hemiarthroplasty without apparent complication.  Postoperative soft tissue gas is noted. Left femoral artery stent is  noted. Skin staples are in place.

## 2018-10-20 NOTE — ANESTHESIA PREPROCEDURE EVALUATION
Anesthesia Evaluation     . Pt has had prior anesthetic.     History of anesthetic complications   - PONV        ROS/MED HX    ENT/Pulmonary: Comment: Dental abscess on antibiotics     (+)tobacco use, Past use COPD, , . .   (-) sleep apnea   Neurologic: Comment: Bell's palsey    (+)dementia, neuropathy (feet)     Cardiovascular: Comment: Name: PATRICIA BOBO  MRN: [id number]  : 1935  Study Date: 2018 01:54 PM  Age: 82 yrs  Gender: Female  [place] [facility] [place] For Study: Atrial fibrillation, unspecified type (H)  Ordering Physician: CARSON VILLALPANDO  Referring Physician: CARSON VILLALPANDO  Performed By: Crystal Fuentes,     BSA: 1.9 m2  Height: 66 in  Weight: 169 lb  HR: 70  BP: 144/57 mmHg  _____________________________________________________________________________  __     Procedure  Complete Echo Adult. Contrast Lumason.     _____________________________________________________________________________  __        Interpretation Summary     The visual ejection fraction is estimated at 50-55%.  There is severe biatrial enlargement.  There is moderate (2+) tricuspid regurgitation.  Mild (35-45mmHg) pulmonary hypertension is present.  The IVC is normal in size and reactivity with respiration, suggesting normal  central venous pressure.  The rhythm was atrial fibrillation with paced rhythm.  Compared to the prior study dated 8/10/17, there have been no changes.  _____________________________________________________________________________  __        Left Ventricle  The left ventricle is normal in size. There is normal left ventricular wall  thickness. The visual ejection fraction is estimated at 50-55%. Diastolic  Doppler findings (E/E' ratio and/or other parameters) suggest left ventricular  filling pressures are indeterminate. No regional wall motion abnormalities  noted.     Right Ventricle  The right ventricle is normal in structure, function and size. There is  a  catheter/pacemaker lead seen in the right ventricle.     Atria  There is severe biatrial enlargement. There is no color Doppler evidence of an  atrial shunt.     Mitral Valve  There is moderate mitral annular calcification. The mitral valve leaflets  appear thickened, but open well. There is mild (1+) mitral regurgitation.     Tricuspid Valve  The tricuspid valve is normal in structure and function. There is moderate  (2+) tricuspid regurgitation. The right ventricular systolic pressure is  approximated at 35mmHg plus the right atrial pressure. Mild (35-45mmHg)  pulmonary hypertension is present.        Aortic Valve  The aortic valve is trileaflet with aortic valve sclerosis. There is trace  aortic regurgitation.     Pulmonic Valve  Normal pulmonic valve. There is trace to mild pulmonic valvular regurgitation.     Vessels  Normal size aorta. The IVC is normal in size and reactivity with respiration,  suggesting normal central venous pressure.     Pericardium  The pericardium appears normal.     Rhythm  The rhythm was atrial fibrillation with paced rhythm.    (+) Dyslipidemia, hypertension-Peripheral Vascular Disease-CAD, -past MI,-. Taking blood thinners : . CHF . . pacemaker :type: A V node ablation  . dysrhythmias a-fib, . pulmonary hypertension,       METS/Exercise Tolerance:     Hematologic: Comments: Inc. INR    (+) Anemia, -      Musculoskeletal: Comment: Spinal stenosis, thoracic compression fxs, cannot lay flat  (+) arthritis, , , -       GI/Hepatic: Comment: Rectal stenosis, inc. LFTs     (+) GERD Asymptomatic on medication,       Renal/Genitourinary:     (+) chronic renal disease, type: CRI,       Endo:     (+) type II DM .      Psychiatric:     (+) psychiatric history anxiety and depression      Infectious Disease:         Malignancy:   (+) Malignancy History of Breast          Other:    (+) H/O Chronic Pain,H/O chronic opiod use ,                    Physical Exam      Airway   Mallampati: II  TM  distance: >3 FB  Neck ROM: full    Dental   (+) loose, missing, chipped, caps and implants  Comment: Upper tooth loose-MS Gibbs understands may lose tooth during procedure and we cannot be responsible for teeth replacement.      Cardiovascular   Rhythm and rate: regular      Pulmonary    breath sounds clear to auscultation                    Anesthesia Plan      History & Physical Review  History and physical reviewed and following examination; no interval change.    ASA Status:  4 .        Plan for General and ETT   PONV prophylaxis:  Ondansetron (or other 5HT-3)  Additional equipment: Videolaryngoscope and Arterial Line No midazolam, Glidescope, phenylephrine infusion, monitor glucose, magnet, electric cautery pad distal from pacer       Postoperative Care      Consents  Anesthetic plan, risks, benefits and alternatives discussed with:  Patient.  Use of blood products discussed: Yes.   Use of blood products discussed with Patient.  .                          .  Procedure: Procedure(s):  LEFT HIP HERACLIO-ARTHROPLASTY (S&N)  Preop diagnosis: LEFT HIP FEMORAL NECK FRACTURE    Allergies   Allergen Reactions     Ambien [Zolpidem Tartrate] Visual Disturbance     Hallucinations and fell out of bed at night.     Penicillins Hives     Definity      Caused a syncopal episode.     Sulfa Drugs Itching     Cymbalta Rash     Fluconazole Rash     Past Medical History:   Diagnosis Date     Anemia      Ankle arthritis      Arthritis      Back pain      Bell's palsy 9/08    left     Breast CA (H) 2004-    left lumpectomy, radiation, -recurrence     CKD (chronic kidney disease)     stage 3 baseline Cr 1.3     Colon polyps     colonoscopy-9/12-next due in 9/13     Congestive heart failure (H)      COPD (chronic obstructive pulmonary disease) (H)      Coronary artery disease      DM (diabetes mellitus) (H)      GERD (gastroesophageal reflux disease)      Hyperlipidemia      Hypertension      Lumbar spinal stenosis     use cane      Nicotine dependence      Obesity      Osteoporosis      Other chronic pain      Pacemaker      Permanent atrial fibrillation (H) 8/2008    S/P AV node ablation and pacemaker 10-25-12       Pleural effusion      Pulmonary hypertension (H)      PVD (peripheral vascular disease) (H)      Rectal stenosis      Tobacco abuse     current     Tricuspid regurgitation      Past Surgical History:   Procedure Laterality Date     ARTHROSCOPY SHOULDER RT/LT  1999, 2004    Bilateral, right then left     BONE MARROW BIOPSY, BONE SPECIMEN, NEEDLE/TROCAR N/A 8/29/2017    Procedure: BIOPSY BONE MARROW;  BONE MARROW BIOPSY;  Surgeon: Marino Cohen MD;  Location:  GI     C DEXA, BONE DENSITY, AXIAL SKEL       C MAMMOGRAM, SCREENING  1/2009     COLONOSCOPY N/A 10/7/2016    Procedure: COMBINED COLONOSCOPY, SINGLE OR MULTIPLE BIOPSY/POLYPECTOMY BY BIOPSY;  Surgeon: Cassandra Mccord MD;  Location:  GI     ESOPHAGOSCOPY, GASTROSCOPY, DUODENOSCOPY (EGD), COMBINED N/A 8/10/2017    Procedure: COMBINED ESOPHAGOSCOPY, GASTROSCOPY, DUODENOSCOPY (EGD), BIOPSY SINGLE OR MULTIPLE;  gastroscopy;  Surgeon: Helene Bustamante MD;  Location:  GI     H ABLATION AV NODE  10/2012     HC COLONOSCOPY THRU STOMA, DIAGNOSTIC  ? 2005     IMPLANT PACEMAKER  10/2012    [place] [id number], [id number]     JOINT REPLACEMTN, KNEE RT/LT      Joint Replacement knee bilateral     LAPAROSCOPIC CHOLECYSTECTOMY WITH CHOLANGIOGRAMS N/A 12/14/2015    Procedure: LAPAROSCOPIC CHOLECYSTECTOMY WITH CHOLANGIOGRAMS;  Surgeon: Ervin Amos MD;  Location:  OR     LUMPECTOMY BREAST      Left-2004     PHACOEMULSIFICATION CLEAR CORNEA WITH STANDARD INTRAOCULAR LENS IMPLANT Left 7/16/2015    Procedure: PHACOEMULSIFICATION CLEAR CORNEA WITH STANDARD INTRAOCULAR LENS IMPLANT;  Surgeon: Sai Obregon MD;  Location:  EC     PHACOEMULSIFICATION CLEAR CORNEA WITH TORIC INTRAOCULAR LENS IMPLANT Right 5/9/2017    Procedure: PHACOEMULSIFICATION CLEAR CORNEA WITH  TORIC INTRAOCULAR LENS IMPLANT;  RIGHT EYE PHACOEMULSIFICATION CLEAR CORNEA WITH TORIC INTRAOCULAR LENS IMPLANT ;  Surgeon: Duc Richmond MD;  Location: [facility]     Social History   Substance Use Topics     Smoking status: Former Smoker     Packs/day: 0.25     Years: 45.00     Types: Cigarettes     Smokeless tobacco: Never Used     Alcohol use No     Prior to Admission medications    Medication Sig Start Date End Date Taking? Authorizing Provider   apixaban ANTICOAGULANT (ELIQUIS) 2.5 MG tablet Take 1 tablet (2.5 mg) by mouth 2 times daily 4/16/18  Yes Neisha Esparza MD   ACCU-CHEK SMARTVIEW test strip USE 1 STRIP BY IN VITRO ROUTE 2 TIMES DAILY OR AS DIRECTED 4/27/18   Neisha Esparza MD   albuterol (PROAIR HFA, PROVENTIL HFA, VENTOLIN HFA) 108 (90 BASE) MCG/ACT inhaler Inhale 2 puffs into the lungs every 4 hours as needed for shortness of breath / dyspnea or wheezing 2/10/15   Neisha Esparza MD   aspirin EC 81 MG EC tablet Take 1 tablet (81 mg) by mouth daily 5/9/18   Dolores Mast PA-C   diazepam 10 mg SUPP Insert one suppository vaginally at bedtime as needed 7/31/18   Neisha Esparza MD   fluticasone (FLONASE) 50 MCG/ACT spray INHALE 1 TO 2 SPRAYS INTO BOTH NOSTRILS DAILY 10/16/18   Neisha Esparza MD   FUROSEMIDE PO Take 20 mg by mouth daily    Unknown, Entered By History   GlipiZIDE (GLUCOTROL PO) Take 5 mg by mouth every morning (before breakfast)    Reported, Patient   guaiFENesin (MUCINEX) 600 MG 12 hr tablet Take 1 tablet (600 mg) by mouth 2 times daily 7/23/18   Lucy Collier PA-C   HYDROcodone-acetaminophen (NORCO) 5-325 MG per tablet Take 1 tablet by mouth every 6 hours as needed for pain 8/9/18   Neisha Esparza MD   hydrOXYzine (VISTARIL) 25 MG capsule Take 1 capsule (25 mg) by mouth 2 times daily as needed for itching 4/25/17   Neisha Esparza MD   lidocaine (LIDODERM) 5 % Patch Apply up to 3 patches to painful area at once for up to 12 h within a 24 h  period.  Remove after 12 hours. 8/9/18   Neisha Esparza MD   lisinopril (PRINIVIL/ZESTRIL) 10 MG tablet Take 1 tablet (10 mg) by mouth 2 times daily 5/14/18   Neisha Esparza MD   magnesium hydroxide (MILK OF MAGNESIA) 400 MG/5ML suspension Take 30 mLs by mouth At Bedtime  8/20/14   Reported, Patient   metoclopramide (REGLAN) 5 MG tablet Take 1 tablet (5 mg) by mouth 3 times daily as needed 7/18/18   Trigger, Duran Young MD   metoprolol tartrate (LOPRESSOR) 25 MG tablet Take 1 tablet (25 mg) by mouth 2 times daily 5/14/18   Neisha Esparza MD   ondansetron (ZOFRAN ODT) 4 MG ODT tab Take 1 tablet (4 mg) by mouth every 6 hours as needed for nausea 10/16/17   Neisha Esparza MD   pregabalin (LYRICA) 75 MG capsule Take 1 capsule (75 mg) by mouth 2 times daily 8/9/18   Neisha Esparza MD   ranitidine (ZANTAC) 150 MG tablet TAKE ONE TABLET BY MOUTH TWO TIMES A DAY 3/21/18   Neisha Esparza MD   simvastatin (ZOCOR) 10 MG tablet TAKE 1 TABLET (10 MG) BY MOUTH AT BEDTIME 3/14/18   Neisha Esparza MD   tiotropium (SPIRIVA) 18 MCG capsule Inhale 1 capsule (18 mcg) into the lungs every evening 7/17/18   Neisha Esparza MD   traMADol (ULTRAM) 50 MG tablet Take 1 tablet (50 mg) by mouth every 6 hours as needed for severe pain Do not take hydrocodone with this 9/13/18   Neisha Esparza MD     Current Facility-Administered Medications Ordered in Epic   Medication Dose Route Frequency Last Rate Last Dose     [Auto Hold] acetaminophen (TYLENOL) tablet 650 mg  650 mg Oral Q4H PRN         [Auto Hold] albuterol neb solution 2.5 mg  2.5 mg Nebulization Q2H PRN         [Auto Hold] aspirin EC tablet 81 mg  81 mg Oral Daily         [Auto Hold] bisacodyl (DULCOLAX) Suppository 10 mg  10 mg Rectal Daily PRN         [Auto Hold] clindamycin (CLEOCIN) infusion 900 mg  900 mg Intravenous Q8H 50 mL/hr at 10/20/18 0301 900 mg at 10/20/18 0301     clindamycin (CLEOCIN) infusion 900 mg  900 mg Intravenous Pre-Op/Pre-procedure x  1 dose         clindamycin (CLEOCIN) infusion 900 mg  900 mg Intravenous See Admin Instructions         [Auto Hold] glucose gel 15-30 g  15-30 g Oral Q15 Min PRN        Or     [Auto Hold] dextrose 50 % injection 25-50 mL  25-50 mL Intravenous Q15 Min PRN        Or     [Auto Hold] glucagon injection 1 mg  1 mg Subcutaneous Q15 Min PRN         [Auto Hold] fluticasone (FLONASE) 50 MCG/ACT spray 2 spray  2 spray Both Nostrils Daily         [Auto Hold] HYDROmorphone (PF) (DILAUDID) injection 0.3-0.5 mg  0.3-0.5 mg Intravenous Q2H PRN   0.5 mg at 10/20/18 0308     [Auto Hold] insulin aspart (NovoLOG) inj (RAPID ACTING)  1-6 Units Subcutaneous Q4H   1 Units at 10/20/18 0312     [Auto Hold] ipratropium - albuterol 0.5 mg/2.5 mg/3 mL (DUONEB) neb solution 3 mL  3 mL Nebulization 4x daily         lactated ringers infusion   Intravenous Continuous 25 mL/hr at 10/20/18 0704       [Auto Hold] lidocaine (LMX4) cream   Topical Q1H PRN         [Auto Hold] lidocaine 1 % 1 mL  1 mL Other Q1H PRN         lidocaine 1 % 1 mL  1 mL Other Q1H PRN   0.2 mL at 10/20/18 0704     [Auto Hold] melatonin tablet 1 mg  1 mg Oral At Bedtime PRN         [Auto Hold] metoprolol tartrate (LOPRESSOR) tablet 25 mg  25 mg Oral BID   25 mg at 10/20/18 0657     [Auto Hold] naloxone (NARCAN) injection 0.1-0.4 mg  0.1-0.4 mg Intravenous Q2 Min PRN         [Auto Hold] ondansetron (ZOFRAN-ODT) ODT tab 4 mg  4 mg Oral Q6H PRN        Or     [Auto Hold] ondansetron (ZOFRAN) injection 4 mg  4 mg Intravenous Q6H PRN         [Auto Hold] oxyCODONE IR (ROXICODONE) tablet 5-10 mg  5-10 mg Oral Q3H PRN   5 mg at 10/20/18 0416     [Auto Hold] polyethylene glycol (MIRALAX/GLYCOLAX) Packet 17 g  17 g Oral Daily PRN         [Auto Hold] pregabalin (LYRICA) capsule 75 mg  75 mg Oral BID         [Auto Hold] prochlorperazine (COMPAZINE) injection 5 mg  5 mg Intravenous Q6H PRN        Or     [Auto Hold] prochlorperazine (COMPAZINE) tablet 5 mg  5 mg Oral Q6H PRN        Or      [Auto Hold] prochlorperazine (COMPAZINE) Suppository 12.5 mg  12.5 mg Rectal Q12H PRN         [Auto Hold] senna-docusate (SENOKOT-S;PERICOLACE) 8.6-50 MG per tablet 1 tablet  1 tablet Oral BID PRN        Or     [Auto Hold] senna-docusate (SENOKOT-S;PERICOLACE) 8.6-50 MG per tablet 2 tablet  2 tablet Oral BID PRN         [Auto Hold] simvastatin (ZOCOR) tablet 10 mg  10 mg Oral At Bedtime         [Auto Hold] sodium chloride (PF) 0.9% PF flush 3 mL  3 mL Intracatheter Q1H PRN         [Auto Hold] sodium chloride (PF) 0.9% PF flush 3 mL  3 mL Intracatheter Q8H   3 mL at 10/20/18 0234     sodium chloride 0.9% infusion   Intravenous Continuous 100 mL/hr at 10/20/18 0516       tranexamic acid (CYKLOKAPRON) 1 g in sodium chloride 0.9 % 60 mL bolus  1 g Intravenous Once         tranexamic acid (CYKLOKAPRON) 1 g in sodium chloride 0.9 % 60 mL bolus  1 g Intravenous Once         No current [facility]-ordered outpatient prescriptions on file.       lactated ringers 25 mL/hr at 10/20/18 0704     sodium chloride 100 mL/hr at 10/20/18 0516     Wt Readings from Last 1 Encounters:   10/20/18 72.6 kg (160 lb)     Temp Readings from Last 1 Encounters:   10/20/18 36.7  C (98  F) (Oral)     BP Readings from Last 6 Encounters:   10/20/18 107/64   09/24/18 116/72   08/09/18 127/77   08/06/18 124/66   07/23/18 156/81   07/18/18 160/78     Pulse Readings from Last 4 Encounters:   10/20/18 70   09/24/18 70   08/09/18 70   08/06/18 66     Resp Readings from Last 1 Encounters:   10/20/18 14     SpO2 Readings from Last 1 Encounters:   10/20/18 95%     Recent Labs   Lab Test  10/19/18   2156  07/22/18   0555   NA  135  133   POTASSIUM  4.4  4.3   CHLORIDE  96  99   CO2  30  26   ANIONGAP  9  8   GLC  178*  155*   BUN  43*  14   CR  1.80*  1.04   GERARD  8.6  10.0     Recent Labs   Lab Test  10/19/18   2156  07/21/18   0800  07/18/18   1200  07/16/18   1718   AST  300*  15  13  14   ALT  255*  16  17  18   ALKPHOS  98  75  79  78   BILITOTAL  0.7  1.1  1.0   1.1   LIPASE   --    --   78  62*     Recent Labs   Lab Test  10/19/18   2156  08/09/18   1332   WBC  9.7  7.2   HGB  9.3*  14.1   PLT  260  254     Recent Labs   Lab Test  10/19/18   2156   ABO  O   RH  Pos     Recent Labs   Lab Test  10/19/18   2156  05/07/18   0608   01/05/18   0800   INR  1.54*   --    --   1.00   PTT  31  51*   < >   --     < > = values in this interval not displayed.      Recent Labs   Lab Test  07/21/18   0800  07/16/18   1718  05/22/18   1639   TROPI  <0.015  <0.015  <0.015     Recent Labs   Lab Test  12/27/15   1015  08/08/12   1310   PH  7.41  7.35   PCO2  50*  44   PO2  79*  55*   HCO3  32*  24     No results for input(s): HCG in the last 44528 hours.  Recent Results (from the past 744 hour(s))   US EMIGDIO Doppler No Exercise 1-2 Levels Bilateral    Narrative    US EMIGDIO DOPPLER NO EXERCISE, 1-2 LEVELS,??? BILAT  9/24/2018 11:53 AM     HISTORY: History of tibial angioplasty; Critical ischemia of lower  extremity; PAD (peripheral artery disease) (H)    COMPARISON: None.    FINDINGS:   The resting right and left ankle-brachial indices are 1.24 and 0.92  respectively versus 1.11 and 0.92 on the prior exam. Waveform analysis  indicates multiphasic waveforms in the right distal tibial arteries  and monophasic waveforms in the distal left tibial arteries.      Impression    IMPRESSION: Findings would indicate mild left lower extremity arterial  insufficiency.      EMIGDIO Diagnostic Criteria      >/= 1.3          Non compressible  0.95-1.0          Normal  0.90-0.94        Mild PAD  0.50-0.89        Moderate PAD  0.20-0.49        Severe PAD  <0.20               Critical    ELYSIA MIR MD   Head CT w/o contrast    Narrative    CT SCAN OF THE HEAD WITHOUT CONTRAST   10/19/2018 10:12 PM     HISTORY: fall, hit head on eliquis;     TECHNIQUE:  Axial images of the head and coronal reformations without  IV contrast material. Radiation dose for this scan was reduced using  automated exposure control,  adjustment of the mA and/or kV according  to patient size, or iterative reconstruction technique.    COMPARISON: None.    FINDINGS:  There is diffuse parenchymal volume loss.  White matter  changes are present in the cerebral hemispheres that are consistent  with small vessel ischemic disease in this age patient. There is no  evidence of intracranial hemorrhage, mass, acute infarct or anomaly.  The visualized portions of the sinuses and mastoids appear normal.  There is no evidence of trauma.      Impression    IMPRESSION: No intracranial hemorrhage or skull fractures are  identified.      JOHNATHAN LOWRY MD   XR Pelvis w Hip Left 1 View    Narrative    PELVIS WITH LEFT HIP LATERAL TWO VIEWS  10/19/2018 10:31 PM     HISTORY:  Pain.     COMPARISON: 7/18/2018.      Impression    IMPRESSION:  There is a displaced and angulated acute appearing  fracture of the left femoral neck. No other areas suspicious for acute  fracture are identified. Diffuse osteopenia. Arterial calcification.  Left femoral artery stent. Moderate amount of gas and stool within  loops of colon are noted in the pelvis.    BARAK IZQUIERDO MD   XR Chest 1 View    Narrative    CHEST ONE VIEW  10/19/2018 10:33 PM     HISTORY:  Pain.     COMPARISON: 7/21/2018.      Impression    IMPRESSION: Dual-lead cardiac device left chest wall, with lead tips  unchanged in position. There is likely a small left pleural effusion,  as well as mild associated infiltrate and/or atelectasis at the left  lung base laterally. The lungs are otherwise clear. Heart size appears  stable. Pulmonary vascularity is within normal limits. Aortic  calcification.    BARAK IZQUIERDO MD       RECENT LABS:   ECG:   ECHO:

## 2018-10-20 NOTE — ED NOTES
Bed: ED01  Expected date:   Expected time:   Means of arrival:   Comments:  HEMS 417 89yo fall, hip pain

## 2018-10-20 NOTE — ANESTHESIA POSTPROCEDURE EVALUATION
Patient: Helene Gibbs    Procedure(s):  LEFT HIP HERACLIO-ARTHROPLASTY     Diagnosis:LEFT HIP FEMORAL NECK FRACTURE  Diagnosis Additional Information: No value filed.    Anesthesia Type:  General, ETT    Note:  Anesthesia Post Evaluation    Patient location during evaluation: PACU  Patient participation: Able to fully participate in evaluation  Level of consciousness: awake  Pain management: adequate  Airway patency: patent  Cardiovascular status: acceptable  Respiratory status: acceptable  Hydration status: acceptable  PONV: controlled     Anesthetic complications: None          Last vitals:  Vitals:    10/20/18 1231 10/20/18 1300 10/20/18 1330   BP: 118/67 127/70 123/68   Pulse:      Resp: 9 12 16   Temp: 36.9  C (98.4  F)     SpO2: 92% 97% 98%         Electronically Signed By: Amina Coburn MD  October 20, 2018  1:41 PM

## 2018-10-20 NOTE — ED NOTES
[facility]  ED Nurse Handoff Report    ED Chief complaint: Fall (Fell backward while ambulatig with walker, hit right sided of body to a refrezerator and fell on left side of the body. Reports left hip and thigh pain. Hit head but no LOC, on Eliquis. )      ED Diagnosis:   Final diagnoses:   Closed fracture of left hip, initial encounter (H)   Closed head injury, initial encounter   Chronic obstructive pulmonary disease, unspecified COPD type (H)   Anemia, unspecified type   Renal insufficiency   Elevated LFTs   Dental infection       Code Status: DNR / DNI----according to most recent visit    Allergies:   Allergies   Allergen Reactions     Ambien [Zolpidem Tartrate] Visual Disturbance     Hallucinations and fell out of bed at night.     Penicillins Hives     Definity      Caused a syncopal episode.     Sulfa Drugs Itching     Cymbalta Rash     Fluconazole Rash       Activity level - Baseline/Home:  Independent with use of walker    Activity Level - Current:   Total Care     Needed?: No    Isolation: No  Infection: Not Applicable  Bariatric?: No    Vital Signs:   Vitals:    10/19/18 2258 10/19/18 2259 10/19/18 2300 10/19/18 2340   BP: 138/85   159/86   Pulse:   73    Resp:       Temp:       TempSrc:       SpO2:  98%  98%   Weight:       Height:           Cardiac Rhythm: ,        Pain level: 0-10 Pain Scale: 9    Is this patient confused?: No   [place] - Suicide Severity Rating Scale Completed?  Yes  If yes, what color did the patient score?  White    Patient Report: Initial Complaint: fall  Focused Assessment: lives in [facility].  Tonight fell backwards while ambulating with walker.  Hit refrigerator with right side of body and fell onto left side.  Arrived with left hip and right thigh pain.  Hit head but denied LOC.  Takes eliquis. Head CT negative.  Has left femoral neck fracture.  Tests Performed: labs, urine, ekg, cxr, head CT, XR pelvis with left hip  Abnormal Results: left  femoral neck fracture,   Results for orders placed or performed during the hospital encounter of 10/19/18   XR Pelvis w Hip Left 1 View    Narrative    PELVIS WITH LEFT HIP LATERAL TWO VIEWS  10/19/2018 10:31 PM     HISTORY:  Pain.     COMPARISON: 7/18/2018.      Impression    IMPRESSION:  There is a displaced and angulated acute appearing  fracture of the left femoral neck. No other areas suspicious for acute  fracture are identified. Diffuse osteopenia. Arterial calcification.  Left femoral artery stent. Moderate amount of gas and stool within  loops of colon are noted in the pelvis.    BARAK IZQUIERDO MD   Head CT w/o contrast    Narrative    CT SCAN OF THE HEAD WITHOUT CONTRAST   10/19/2018 10:12 PM     HISTORY: fall, hit head on eliquis;     TECHNIQUE:  Axial images of the head and coronal reformations without  IV contrast material. Radiation dose for this scan was reduced using  automated exposure control, adjustment of the mA and/or kV according  to patient size, or iterative reconstruction technique.    COMPARISON: None.    FINDINGS:  There is diffuse parenchymal volume loss.  White matter  changes are present in the cerebral hemispheres that are consistent  with small vessel ischemic disease in this age patient. There is no  evidence of intracranial hemorrhage, mass, acute infarct or anomaly.  The visualized portions of the sinuses and mastoids appear normal.  There is no evidence of trauma.      Impression    IMPRESSION: No intracranial hemorrhage or skull fractures are  identified.      JOHNATHAN LOWRY MD   XR Chest 1 View    Narrative    CHEST ONE VIEW  10/19/2018 10:33 PM     HISTORY:  Pain.     COMPARISON: 7/21/2018.      Impression    IMPRESSION: Dual-lead cardiac device left chest wall, with lead tips  unchanged in position. There is likely a small left pleural effusion,  as well as mild associated infiltrate and/or atelectasis at the left  lung base laterally. The lungs are otherwise clear. Heart size  appears  stable. Pulmonary vascularity is within normal limits. Aortic  calcification.    BARAK IZQUIERDO MD   CBC with platelets differential   Result Value Ref Range    WBC 9.7 4.0 - 11.0 10e9/L    RBC Count 3.20 (L) 3.8 - 5.2 10e12/L    Hemoglobin 9.3 (L) 11.7 - 15.7 g/dL    Hematocrit 29.6 (L) 35.0 - 47.0 %    MCV 93 78 - 100 fl    MCH 29.1 26.5 - 33.0 pg    MCHC 31.4 (L) 31.5 - 36.5 g/dL    RDW 15.2 (H) 10.0 - 15.0 %    Platelet Count 260 150 - 450 10e9/L    Diff Method Automated Method     % Neutrophils 75.9 %    % Lymphocytes 8.1 %    % Monocytes 14.7 %    % Eosinophils 0.8 %    % Basophils 0.2 %    % Immature Granulocytes 0.3 %    Nucleated RBCs 0 0 /100    Absolute Neutrophil 7.3 1.6 - 8.3 10e9/L    Absolute Lymphocytes 0.8 0.8 - 5.3 10e9/L    Absolute Monocytes 1.4 (H) 0.0 - 1.3 10e9/L    Absolute Eosinophils 0.1 0.0 - 0.7 10e9/L    Absolute Basophils 0.0 0.0 - 0.2 10e9/L    Abs Immature Granulocytes 0.0 0 - 0.4 10e9/L    Absolute Nucleated RBC 0.0    INR   Result Value Ref Range    INR 1.54 (H) 0.86 - 1.14   Partial thromboplastin time   Result Value Ref Range    PTT 31 22 - 37 sec   Comprehensive metabolic panel   Result Value Ref Range    Sodium 135 133 - 144 mmol/L    Potassium 4.4 3.4 - 5.3 mmol/L    Chloride 96 94 - 109 mmol/L    Carbon Dioxide 30 20 - 32 mmol/L    Anion Gap 9 3 - 14 mmol/L    Glucose 178 (H) 70 - 99 mg/dL    Urea Nitrogen 43 (H) 7 - 30 mg/dL    Creatinine 1.80 (H) 0.52 - 1.04 mg/dL    GFR Estimate 27 (L) >60 mL/min/1.7m2    GFR Estimate If Black 33 (L) >60 mL/min/1.7m2    Calcium 8.6 8.5 - 10.1 mg/dL    Bilirubin Total 0.7 0.2 - 1.3 mg/dL    Albumin 3.1 (L) 3.4 - 5.0 g/dL    Protein Total 6.8 6.8 - 8.8 g/dL    Alkaline Phosphatase 98 40 - 150 U/L     (H) 0 - 50 U/L     (H) 0 - 45 U/L   UA with Microscopic reflex to Culture   Result Value Ref Range    Color Urine Yellow     Appearance Urine Slightly Cloudy     Glucose Urine Negative NEG^Negative mg/dL    Bilirubin  Urine Negative NEG^Negative    Ketones Urine Negative NEG^Negative mg/dL    Specific Gravity Urine 1.012 1.003 - 1.035    Blood Urine Negative NEG^Negative    pH Urine 7.0 5.0 - 7.0 pH    Protein Albumin Urine 10 (A) NEG^Negative mg/dL    Urobilinogen mg/dL Normal 0.0 - 2.0 mg/dL    Nitrite Urine Negative NEG^Negative    Leukocyte Esterase Urine Moderate (A) NEG^Negative    Source Catheterized Urine     WBC Urine 8 (H) 0 - 5 /HPF    RBC Urine 0 0 - 2 /HPF    Squamous Epithelial /HPF Urine 13 (H) 0 - 1 /HPF    Hyaline Casts 18 (H) 0 - 2 /LPF   EKG 12-lead, tracing only   Result Value Ref Range    Interpretation ECG Click View Image link to view waveform and result    ABO/Rh type and screen   Result Value Ref Range    ABO O     RH(D) Pos     Antibody Screen Neg     Test Valid Only At [facility]        Specimen Expires 10/22/2018      *Note: Due to a large number of results and/or encounters for the requested time period, some results have not been displayed. A complete set of results can be found in Results Review.       Treatments provided: dilaudid for pain, floyd catheter inserted    Family Comments: spoke with pts. Niece Enedelia Kamara ([phone]) who is aware of admission.      OBS brochure/video discussed/provided to patient/family: No              Name of person given brochure if not patient: n/a              Relationship to patient: n/a    ED Medications:   Medications   HYDROmorphone (PF) (DILAUDID) injection 0.5 mg (0.5 mg Intravenous Given 10/19/18 1615)   HYDROmorphone (PF) (DILAUDID) injection 0.5 mg (0.5 mg Intravenous Given 10/19/18 7053)       Drips infusing?:  No    For the majority of the shift this patient was Green.   Interventions performed were routine cares and interventions for pain control.    Severe Sepsis OR Septic Shock Diagnosis Present: No    To be done/followed up on inpatient unit:  pain control    ED NURSE PHONE NUMBER: *[phone]

## 2018-10-20 NOTE — PLAN OF CARE
Problem: Patient Care Overview  Goal: Plan of Care/Patient Progress Review  Outcome: No Change  Pt admitted via ED, alert and oriented, uncomfortable due to pain her left leg. Was taken down to preop at 0645.

## 2018-10-20 NOTE — PLAN OF CARE
Problem: Patient Care Overview  Goal: Plan of Care/Patient Progress Review  PT: Orders received, chart reviewed. Pt has had low BP since arrival to 55, nursing notes it is starting to trend up, however has not been alert and still appears somewhat sedated, not appropriate for PT eval at this time. Will re-schedule for tomorrow morning.

## 2018-10-20 NOTE — OP NOTE
Procedure Date: 10/20/2018      PREOPERATIVE DIAGNOSIS:  Left hip displaced femoral neck fracture.      POSTOPERATIVE DIAGNOSIS:  Left hip displaced femoral neck fracture.      PROCEDURE:  Left hip hemiarthroplasty.      SURGEON:  Ish Fish MD      FIRST ASSISTANT:  Milka Anne PA-C.  Skilled first assistant was necessary for patient positioning, prepping and draping, help with retraction, as well as with holding the leg, reduction maneuvers, and closing and patient transfer.        ESTIMATED BLOOD LOSS:  250 mL.      ANESTHESIA:  General.      COMPLICATIONS:  None apparent.      IMPLANTS:     1.  [employer] Synergy cemented high offset size 14 stem.   2.  [employer] Nephew +0 cobalt chrome tapered neck.   3.  [employer] bipolar head size 28 mm inner diameter, 46 mm outer diameter.      INDICATIONS:  The patient is an 83-year-old female, who normally walks with a walker, on Eliquis, who had a loss of balance and fell directly onto her left side yesterday.  She had immediate pain and inability to bear weight.  She was brought to the emergency room, diagnosed with a displaced femoral neck fracture.  She was admitted for medical optimization and orthopedic consultation.  After discussing with the patient about treatment options, we decided the best way to move forward would be a left hip hemiarthroplasty.  She agreed to proceed.      DESCRIPTION OF PROCEDURE:  On the date of the procedure, the patient was met in the preoperative area by the surgeon and anesthesia team.  Her left hip was marked by the operative surgeon and informed consent was obtained.  Risks and benefits of the surgery were discussed with the patient, including risk of bleeding, infection, damage to neurovascular structures, risk of blood clots, risk of dislocation, risk of need for future surgery, as well as revision, as well as risks of anesthesia.  She understood and agreed to proceed.      The patient was then brought to the  operating room and general anesthesia was administered.  She was then placed onto the operating room table in supine position and flipped into the lateral decubitus position with her left hip facing up.  The left hip was prepped and draped in the usual sterile fashion.  The patient was held in place with a hip holders and her preoperative antibiotics were given and a preoperative timeout was performed.      We began the procedure by making a standard incision centered over the greater trochanter and sharp dissection was carried down through the skin, as well as the subcutaneous tissue.  Hemostasis was obtained throughout the way.  The IT band and the gluteus marietta fascia were split along the decussating fibers of the gluteus marietta.  A Charnley retractor was then placed deep to the gluteus marietta.  There was a significant amount of bursitis over the greater trochanter, which was debrided and removed using electrocautery.  The gluteus medius was visualized and a cobra retractor was placed deep to the gluteus medius for protection.  We then visualized the short external rotators, as well as the piriformis.  These were tagged with a number 5 FiberWire.  Next, we performed a T-shaped capsulotomy and obtained hemostasis along the way just off the insertion of the capsule of the proximal femur.  The capsule leaflets were tagged using number 2 FiberWire.  Next, we visualized the risk of hematoma.  The fracture was well visualized.  We then removed the head using a corkscrew.  The head was removed and the pulvinar was then debrided and removed as well from the acetabulum.  Hemostasis was obtained.  Next, we turned our attention to the proximal femur.  Appropriate retractors were placed.  The cut was freshened up approximately 1 cm proximal to the lesser trochanter using a neck cut guide.  Subsequently, we used a box osteotome followed by a canal finder to open up the proximal femoral canal.  We then reamed up to a size  14 and broached also up to a size 14 and the size 14 broach seemed to be appropriate.  We then trialled both the standard, as well as high offset, necks with a 0 bipolar head and had excellent stability with the high offset stem and appropriate leg lengths as well.  We then started mixing the cement.  True implants were opened.  The broaches were removed.  The wound was copiously irrigated.  The proximal femur was copiously irrigated.  We placed an epinephrine-soaked sponge into the proximal femur for hemostasis control.  Next, the sponge was removed.  A cement restrictor was placed and the proximal femur was pressurized with cement.  The size 14 high offset [employer] Nephew Synergy stem was then impacted into place and held in place in the appropriate amount of anteversion while the cement dried.  After the cement dried, excess cement was removed.  We then trialled once again with a +0 bipolar head, which seemed appropriate stability, as well as leg length.  We were able to go forward flexion of 90, with internal rotation up to 80 prior to any subluxation.  The true implants were opened.  The wound was copiously irrigated.  A true size 46 femoral head was impacted onto the stem and the hip was reduced.  The wound was copiously irrigated once again.  Two bone tunnels were made through the greater trochanter and we repaired both the short external rotators and the piriformis, as well as the capsule through bone tunnels in the greater trochanter.  The IT band, as well as the gluteus marietta, were closed using number 1 Vicryls followed by 2-0 Vicryls for deep dermals and staples for the skin.  Sterile dressing was applied.  The patient was placed in a hip abduction pillow, awakened from anesthesia, and brought to the PACU for recovery.         DAIN PAUL MD             D: 10/20/2018   T: 10/20/2018   MT: YU      Name:     PATRICIA BOBO   MRN:      [id number]        Account:        [id number]   :       1935           Procedure Date: 10/20/2018      Document: [id number]

## 2018-10-20 NOTE — H&P
[facility]    History and Physical  Hospitalist       Date of Admission:  10/19/2018    Assessment & Plan   Helene Gibbs is a 83 year old female who presents with a mechanical fall at home found to have a right femoral neck fracture.  Generally feeling unwell for the past week with no clear localizing symptoms.  Recently underwent root canal of left lower bicuspid.  On clindamycin per recommendation of outpatient OMFS    Right femoral neck fracture: Patient with mechanical fall while setting up her pills this evening. Chronically anticoagulated for permanent atrial fibrillation status post AV alexandra ablation and permanent pacemaker placement.  Patient is class III revised cardiac risk for coronary artery disease including most recent abnormal stress nuclear medicine study in May 2018 as well as history of congestive heart failure with preserved ejection fraction.  6.6% risk of perioperative myocardial event.  Note also with multiple metabolic abnormalities including ALT and AST elevation of unclear etiology, acute renal injury, acute on chronic anemia.  These features, of course, would likely increase perioperative risk.  -Orthopedic surgery consulted.  Dr. Zimmerman discussed with orthopedic surgery at time of admission.  -Apixaban remains on hold.  Last dose to 10/19/18 a.m.  -Continuing clindamycin  -Remains on 81 mg daily aspirin    Peripheral vascular disease with left femoral artery stent  Coronary artery disease: Patient with a troponin elevation as well as abnormal stress imaging 5/7/18.  Note small area of apical ischemia of moderate intensity at that time.  Decision was for ongoing medical management.  Increases perioperative risk.  -Continue prior to admission 81 mg daily aspirin  -Holding prior to admission apixaban  -Continue prior to admission metoprolol XL  -Prior to admission lisinopril on hold given acute kidney injury.    Atrial fibrillation, permanent: Status post pacemaker  following AV node ablation.  -Prior to admission apixaban 2.5 mg twice daily held.  Anticipate operative intervention on hip fracture.    -Continue prior to admission metoprolol XL    Dental abscess: Patient with recent root canal of left lower bicuspid, right upper second molar fractured.  Still  -Continue clindamycin, converted to IV (continuation was recommended by outpatient OMFS)     Left lobe atelectasis versus pneumonia: Patient with generalized weakness and feeling unwell.  Chronic cough which is not significantly changed.  Reportedly had some mild hypoxia in the emergency department which subsequently resolved with IV fluid administration. No fevers or chills.  -Procalcitonin added on  -continue clindamycin.      Acute kidney injury: Patient with a creatinine of 1.8 from baseline of 1.04 well.  Suspect prerenal, though no recent medication changes and patient without reports of intake change.   -Prior to admission lisinopril 10 mg daily held, prior to admission furosemide 20 mg daily held  -Trend BMP    Acute on chronic blood loss anemia: Patient with a history of melenic stool, none recently.  Note hemoglobin of 9.3 today as compared to 14.1 8/9/18.  Over the past year has undergone colonoscopy as well as EGD.  Polyps on colonoscopy in October 2017, EGD unremarkable other than some antral gastritis.  Some concern that ongoing anemia related to GI blood loss in the setting of chronic antico regulation.  -Resume prior to admission  -Prior to admission apixaban held PPI/H2 therapy when reconciled  -monitor CBC as well as for evidence of fecal blood loss.    Type 2 diabetes:  -Prior to admission glipizide 5 mg daily held  -Medium dose sliding scale insulin while n.p.o.    Hepatitis: Patient with ALT and AST elevated to the 300 range.  No clear etiology for this.  Patient has been feeling generally unwell for the past few days.  With acute creatinine elevation, possibly related to volume depletion and possible  hypoperfusion.  No abdominal pain.  -Follow CMP    History of heart failure preserved ejection fraction: Last TTE in March 2018 with an ejection fraction of 50-55%, severe biatrial enlargement, 2+ tricuspid regurgitation with mild pulmonary hypertension.  -Prior to admission Lasix held  -Prior to admission lisinopril held  -Continue prior to admission metoprolol     COPD:  -Scheduled duo nebs  -PRN albuterol nebulizer treatments  -Holding prior to admission Spiriva while on scheduled DuoNebs    DVT Prophylaxis: Pneumatic compression devices    Code Status: DNR/DNI.  Confirmed with patient on admission.    Disposition: Expected discharge in likely 3-4 days pending operative intervention and subsequent recovery.  Monitor closely for fevers or localizing infectious symptoms.    [facility]    Primary Care Physician   Neisha Esparza    Chief Complaint   Right leg pain    History is obtained from the patient, chart review, discussion with Dr. Zimmerman in the emergency department.      History of Present Illness   Helene Gibbs is a 83 year old female who presents with A mechanical fall.  Patient states that she was at her kitchen counter this evening setting up her evening medications when she felt as though her legs gave out underneath her.  Somewhat describes shifting her feet at time of fall and blames her previous slippers that she was wearing, feeling that they did not  at time of movement.  Did not lose consciousness, fell backward into the side hitting her head on the refrigerator prior to twisting and falling to the ground.  Immediate right leg pain. No radiation. Worse with movement, some improvement with pain medications in the emergency department.  Patient presented where she was found to have a right femoral neck fracture.     Patient describes feeling generally unwell/somewhat unwell over the past week.  She is not able to describe exactly what is felt unwell, though she feels generally under  the weather.  Describes some fatigue.  Over the past few days, she has had several dentistry appointments including for a teeth cleaning and subsequent follow-up for root canal.  Has been on periprocedural clindamycin, and states that she was recommended to continue taking clindamycin for dental infection by her dentist following root canal.    Patient has a cough with some sputum production, though states that this is her baseline cough in the setting of prior smoking history.    Patient has had several hospitalizations in the past year.  Has struggled with ongoing suspected GI blood loss anemia in the setting of anticoagulation from atrial fibrillation.  Underwent colonoscopy in October 2017 with polyps removed, EGD more recently without ulcerations, some antral erythema.  Has not noted any clinic stool or bleeding, though her hemoglobin is in the 9 range from previously 14 range.    Patient has not had any recent chest discomfort.  When I last admitted patient in May 2018 she was experiencing chest pain, had a troponin elevation, and subsequently underwent stress imaging which was abnormal and consistent with a small area of ischemia.  Decision at that time in discussion with cardiology was for ongoing medical management.     No nausea or vomiting, no abdominal pain.  Has not had any recent medication changes other than she held her apixaban for dental procedures over the past days, resumed as of 10/19 morning.  Did not take her Eliquis 10/19 evening as she fell prior to taking her medications.    Past Medical History    I have reviewed this patient's medical history and updated it with pertinent information if needed.   Past Medical History:   Diagnosis Date     Anemia      Ankle arthritis      Arthritis      Back pain      Bell's palsy 9/08    left     Breast CA (H) 2004-    left lumpectomy, radiation, -recurrence     CKD (chronic kidney disease)     stage 3 baseline Cr 1.3     Colon polyps      colonoscopy-9/12-next due in 9/13     Congestive heart failure (H)      COPD (chronic obstructive pulmonary disease) (H)      Coronary artery disease      DM (diabetes mellitus) (H)      GERD (gastroesophageal reflux disease)      Hyperlipidemia      Hypertension      Lumbar spinal stenosis     use cane     Nicotine dependence      Obesity      Osteoporosis      Other chronic pain      Pacemaker      Permanent atrial fibrillation (H) 8/2008    S/P AV node ablation and pacemaker 10-25-12       Pleural effusion      Pulmonary hypertension (H)      PVD (peripheral vascular disease) (H)      Rectal stenosis      Tobacco abuse     current     Tricuspid regurgitation         Past Surgical History   I have reviewed this patient's surgical history and updated it with pertinent information if needed.  Past Surgical History:   Procedure Laterality Date     ARTHROSCOPY SHOULDER RT/LT  1999, 2004    Bilateral, right then left     BONE MARROW BIOPSY, BONE SPECIMEN, NEEDLE/TROCAR N/A 8/29/2017    Procedure: BIOPSY BONE MARROW;  BONE MARROW BIOPSY;  Surgeon: Marino Cohen MD;  Location:  GI     C DEXA, BONE DENSITY, AXIAL SKEL       C MAMMOGRAM, SCREENING  1/2009     COLONOSCOPY N/A 10/7/2016    Procedure: COMBINED COLONOSCOPY, SINGLE OR MULTIPLE BIOPSY/POLYPECTOMY BY BIOPSY;  Surgeon: Cassandra Mccord MD;  Location:  GI     ESOPHAGOSCOPY, GASTROSCOPY, DUODENOSCOPY (EGD), COMBINED N/A 8/10/2017    Procedure: COMBINED ESOPHAGOSCOPY, GASTROSCOPY, DUODENOSCOPY (EGD), BIOPSY SINGLE OR MULTIPLE;  gastroscopy;  Surgeon: Helene Bustamante MD;  Location:  GI     H ABLATION AV NODE  10/2012     HC COLONOSCOPY THRU STOMA, DIAGNOSTIC  ? 2005     IMPLANT PACEMAKER  10/2012    [place] [id number], [id number]     JOINT REPLACEMTN, KNEE RT/LT      Joint Replacement knee bilateral     LAPAROSCOPIC CHOLECYSTECTOMY WITH CHOLANGIOGRAMS N/A 12/14/2015    Procedure: LAPAROSCOPIC CHOLECYSTECTOMY WITH CHOLANGIOGRAMS;  Surgeon: Ervin Amos  MD Leonel;  Location:  OR     LUMPECTOMY BREAST      Left-2004     PHACOEMULSIFICATION CLEAR CORNEA WITH STANDARD INTRAOCULAR LENS IMPLANT Left 7/16/2015    Procedure: PHACOEMULSIFICATION CLEAR CORNEA WITH STANDARD INTRAOCULAR LENS IMPLANT;  Surgeon: Sai Obregon MD;  Location: [facility]     PHACOEMULSIFICATION CLEAR CORNEA WITH TORIC INTRAOCULAR LENS IMPLANT Right 5/9/2017    Procedure: PHACOEMULSIFICATION CLEAR CORNEA WITH TORIC INTRAOCULAR LENS IMPLANT;  RIGHT EYE PHACOEMULSIFICATION CLEAR CORNEA WITH TORIC INTRAOCULAR LENS IMPLANT ;  Surgeon: Duc Richmond MD;  Location: [facility]       Prior to Admission Medications   Prior to Admission Medications   Prescriptions Last Dose Informant Patient Reported? Taking?   ACCU-CHEK SMARTVIEW test strip  Self No No   Sig: USE 1 STRIP BY IN VITRO ROUTE 2 TIMES DAILY OR AS DIRECTED   FUROSEMIDE PO  Self Yes No   Sig: Take 20 mg by mouth daily   GlipiZIDE (GLUCOTROL PO)  Self Yes No   Sig: Take 5 mg by mouth every morning (before breakfast)   HYDROcodone-acetaminophen (NORCO) 5-325 MG per tablet   No No   Sig: Take 1 tablet by mouth every 6 hours as needed for pain   albuterol (PROAIR HFA, PROVENTIL HFA, VENTOLIN HFA) 108 (90 BASE) MCG/ACT inhaler  Self No No   Sig: Inhale 2 puffs into the lungs every 4 hours as needed for shortness of breath / dyspnea or wheezing   apixaban ANTICOAGULANT (ELIQUIS) 2.5 MG tablet  Self No No   Sig: Take 1 tablet (2.5 mg) by mouth 2 times daily   aspirin EC 81 MG EC tablet  Self No No   Sig: Take 1 tablet (81 mg) by mouth daily   diazepam 10 mg SUPP   No No   Sig: Insert one suppository vaginally at bedtime as needed   fluticasone (FLONASE) 50 MCG/ACT spray   No No   Sig: INHALE 1 TO 2 SPRAYS INTO BOTH NOSTRILS DAILY   guaiFENesin (MUCINEX) 600 MG 12 hr tablet   No No   Sig: Take 1 tablet (600 mg) by mouth 2 times daily   hydrOXYzine (VISTARIL) 25 MG capsule  Self No No   Sig: Take 1 capsule (25 mg) by mouth 2 times daily as needed  for itching   lidocaine (LIDODERM) 5 % Patch   No No   Sig: Apply up to 3 patches to painful area at once for up to 12 h within a 24 h period.  Remove after 12 hours.   lisinopril (PRINIVIL/ZESTRIL) 10 MG tablet  Self No No   Sig: Take 1 tablet (10 mg) by mouth 2 times daily   magnesium hydroxide (MILK OF MAGNESIA) 400 MG/5ML suspension  Self Yes No   Sig: Take 30 mLs by mouth At Bedtime    metoclopramide (REGLAN) 5 MG tablet  Self No No   Sig: Take 1 tablet (5 mg) by mouth 3 times daily as needed   metoprolol tartrate (LOPRESSOR) 25 MG tablet  Self No No   Sig: Take 1 tablet (25 mg) by mouth 2 times daily   ondansetron (ZOFRAN ODT) 4 MG ODT tab  Self No No   Sig: Take 1 tablet (4 mg) by mouth every 6 hours as needed for nausea   pregabalin (LYRICA) 75 MG capsule   No No   Sig: Take 1 capsule (75 mg) by mouth 2 times daily   ranitidine (ZANTAC) 150 MG tablet  Self No No   Sig: TAKE ONE TABLET BY MOUTH TWO TIMES A DAY   simvastatin (ZOCOR) 10 MG tablet  Self No No   Sig: TAKE 1 TABLET (10 MG) BY MOUTH AT BEDTIME   tiotropium (SPIRIVA) 18 MCG capsule  Self No No   Sig: Inhale 1 capsule (18 mcg) into the lungs every evening   traMADol (ULTRAM) 50 MG tablet   No No   Sig: Take 1 tablet (50 mg) by mouth every 6 hours as needed for severe pain Do not take hydrocodone with this      Facility-Administered Medications: None     Allergies   Allergies   Allergen Reactions     Ambien [Zolpidem Tartrate] Visual Disturbance     Hallucinations and fell out of bed at night.     Penicillins Hives     Definity      Caused a syncopal episode.     Sulfa Drugs Itching     Cymbalta Rash     Fluconazole Rash       Social History   I have reviewed this patient's social history and updated it with pertinent information if needed. Helene Gibbs  reports that she has quit smoking. Her smoking use included Cigarettes. She has a 11.25 pack-year smoking history. She has never used smokeless tobacco. She reports that she does not drink alcohol  or use illicit drugs.     Family History   I have reviewed this patient's family history and updated it with pertinent information if needed.   Family History   Problem Relation Age of Onset     Hypertension Mother      Diabetes Mother       at 83 yrs     C.A.D. Father       age 70s     Breast Cancer No family hx of      Colon Cancer No family hx of        Review of Systems   The 10 point Review of Systems is negative other than noted in the HPI or here.   No fevers, no rigors  Pain isolated right lower extremity  No abdominal pain  No nausea or vomiting    Physical Exam   Temp: 97.6  F (36.4  C) Temp src: Oral BP: 125/57 Pulse: 73   Resp: 16 SpO2: 92 % O2 Device: Nasal cannula Oxygen Delivery: 2 LPM  Vital Signs with Ranges  Temp:  [97.6  F (36.4  C)] 97.6  F (36.4  C)  Pulse:  [65-73] 73  Resp:  [16-18] 16  BP: (125)/(57) 125/57  SpO2:  [84 %-98 %] 92 %  160 lbs 0 oz    Constitutional: no acute distress, alert, conversant.  Appears generally ill and somewhat fatigued.  Eyes: no scleral icterus or injection  HEENT: moist mucous membranes  Respiratory: breath sounds clear bilaterally to auscultation, no wheezes, no crackles.  Poor air movement throughout lung  Cardiovascular: regular rate and rhythm, no murmur.  Implanted cardiac device in L chest wall.  GI: abdomen soft, non-tender, normoactive bowel sounds, no masses  Lymph/Hematologic: 1-2+ bilateral lower extremity edema, pitting.  Genitourinary: not examined  Musculoskeletal: muscular tone intact in all extremities.  Right lower extremity foreshortened and rotated  Neurologic: mental status grossly intact, no focal deficits, alert.  Mild left facial droop, known history of Bell's palsy.  Psychiatric: Blunted affect    Data   Data reviewed today:  I personally reviewed no images or EKG's today.    Recent Labs  Lab 10/19/18  2156   WBC 9.7   HGB 9.3*   MCV 93      INR 1.54*      POTASSIUM 4.4   CHLORIDE 96   CO2 30   BUN 43*   CR 1.80*    ANIONGAP 9   GERARD 8.6   *   ALBUMIN 3.1*   PROTTOTAL 6.8   BILITOTAL 0.7   ALKPHOS 98   *   *       Recent Results (from the past 24 hour(s))   Head CT w/o contrast    Narrative    CT SCAN OF THE HEAD WITHOUT CONTRAST   10/19/2018 10:12 PM     HISTORY: fall, hit head on eliquis;     TECHNIQUE:  Axial images of the head and coronal reformations without  IV contrast material. Radiation dose for this scan was reduced using  automated exposure control, adjustment of the mA and/or kV according  to patient size, or iterative reconstruction technique.    COMPARISON: None.    FINDINGS:  There is diffuse parenchymal volume loss.  White matter  changes are present in the cerebral hemispheres that are consistent  with small vessel ischemic disease in this age patient. There is no  evidence of intracranial hemorrhage, mass, acute infarct or anomaly.  The visualized portions of the sinuses and mastoids appear normal.  There is no evidence of trauma.      Impression    IMPRESSION: No intracranial hemorrhage or skull fractures are  identified.      JOHNATHAN LOWRY MD   XR Pelvis w Hip Left 1 View    Narrative    PELVIS WITH LEFT HIP LATERAL TWO VIEWS  10/19/2018 10:31 PM     HISTORY:  Pain.     COMPARISON: 7/18/2018.      Impression    IMPRESSION:  There is a displaced and angulated acute appearing  fracture of the left femoral neck. No other areas suspicious for acute  fracture are identified. Diffuse osteopenia. Arterial calcification.  Left femoral artery stent. Moderate amount of gas and stool within  loops of colon are noted in the pelvis.    BARAK IZQUIERDO MD   XR Chest 1 View    Narrative    CHEST ONE VIEW  10/19/2018 10:33 PM     HISTORY:  Pain.     COMPARISON: 7/21/2018.      Impression    IMPRESSION: Dual-lead cardiac device left chest wall, with lead tips  unchanged in position. There is likely a small left pleural effusion,  as well as mild associated infiltrate and/or atelectasis at the left  lung  base laterally. The lungs are otherwise clear. Heart size appears  stable. Pulmonary vascularity is within normal limits. Aortic  calcification.    BARAK IZQUIERDO MD

## 2018-10-21 NOTE — PROVIDER NOTIFICATION
Notified provider in regards to the patient's low urine output. Continue to monitor and no new orders per on-call hospitalist.

## 2018-10-21 NOTE — PROGRESS NOTES
10/21/18 0955   Quick Adds   Type of Visit Initial PT Evaluation   Living Environment   Lives With alone   Living Arrangements (senior living apt)   Home Accessibility no concerns   Number of Stairs to Enter Home 0   Number of Stairs Within Home 0   Transportation Available family or friend will provide   Living Environment Comment pt was mod IND with all mobility within her apt with her FWW    Self-Care   Dominant Hand right   Usual Activity Tolerance good   Current Activity Tolerance fair   Regular Exercise yes   Activity/Exercise Type other (see comments)  ([facility] exercises in chair at [facility])   Exercise Amount/Frequency 3-5 times/wk  (as much as she can 3x/wk)   Equipment Currently Used at Home walker, rolling   Functional Level Prior   Ambulation 1-->assistive equipment   Transferring 1-->assistive equipment   Toileting 2-->assistive person   Bathing 2-->assistive person  (1x/week person assists)   Dressing 0-->independent   Eating 0-->independent   Fall history within last six months no   Number of times patient has fallen within last six months 1   Which of the above functional risks had a recent onset or change? ambulation;transferring   Prior Functional Level Comment moved to senior living 07/12/18   General Information   Onset of Illness/Injury or Date of Surgery - Date 10/19/18   Referring Physician Ish Fish MD   Patient/Family Goals Statement get back to being IND   Pertinent History of Current Problem (include personal factors and/or comorbidities that impact the POC) 82y/o F with fall at home with left femoral neck fx, POD #1 left hip hemiarthroplasty, pt also with generalized weakness and not feeling well following a root canal just prior to fall, hx of COPD, NSTEMI, pacemaker (see chart for full PMHx)   Precautions/Limitations left hip precautions  (posterior hip precautions)   Weight-Bearing Status - LLE weight-bearing as tolerated   General Observations pt resting in bed with HOB elevated, NAD,  reports uncomfortable due to chronic back pain.    General Info Comments Activity: Amb with assist    Cognitive Status Examination   Orientation person;place  (needs cues for day of month)   Level of Consciousness alert   Follows Commands and Answers Questions 100% of the time   Cognitive Comment requires frequent repetition with hip precautions, repetition required with cues for LE ex.    Pain Assessment   Patient Currently in Pain Yes, see Vital Sign flowsheet  (reports pain in back)   Posture    Posture Forward head position;Protracted shoulders   Range of Motion (ROM)   ROM Comment hx of right rotator cuff problems per pt, should abd limited to 70 deg abd, left UE WFL   Strength   Strength Comments left LE able to actively perform SAQ, ankle pumps, 3-/5 hip flex, Right LE: 3-/5 hip flexion, 5/5 ankle DF/PF, quad and hamstring 4+/5,  right shoulder 3-/5, right elbow ext 4-/5, left shoulder 4/5, left elbow flex/ext 4+/5   Bed Mobility   Bed Mobility Comments max A x 2 with supine scooting in bed, pt has difficulty with repositioniong IND   Transfer Skills   Transfer Comments Deferred due to low BP   Gait   Gait Comments Deferred due to low BP   Balance   Balance Comments Deferred due to low BP   Sensory Examination   Sensory Perception Comments pt reports bilateral foot neuropathy   Modality Interventions   Planned Modality Interventions Cryotherapy   Planned Modality Interventions Comments PRN   General Therapy Interventions   Planned Therapy Interventions balance training;bed mobility training;gait training;strengthening;transfer training;home program guidelines;progressive activity/exercise   Clinical Impression   Criteria for Skilled Therapeutic Intervention yes, treatment indicated   PT Diagnosis impaired transfers/gait   Influenced by the following impairments generalized weakness, impaired activity tolerance   Functional limitations due to impairments impaired bed mobility, impaired gait, impaired transfers  "  Clinical Presentation Evolving/Changing   Clinical Presentation Rationale based on clinical presentation   Clinical Decision Making (Complexity) Moderate complexity   Therapy Frequency` 2 times/day   Predicted Duration of Therapy Intervention (days/wks) 1 week   Anticipated Discharge Disposition Transitional Care Facility   Risk & Benefits of therapy have been explained Yes   Patient, Family & other staff in agreement with plan of care Yes   [facility] TM \"6 Clicks\"   2016, Trustees of [facility], under license to [employer].  All rights reserved.   6 Clicks Short Forms Basic Mobility Inpatient Short Form   [facility] AM-PAC  \"6 Clicks\" V.2 Basic Mobility Inpatient Short Form   1. Turning from your back to your side while in a flat bed without using bedrails? 1 - Total   2. Moving from lying on your back to sitting on the side of a flat bed without using bedrails? 1 - Total   3. Moving to and from a bed to a chair (including a wheelchair)? 1 - Total   4. Standing up from a chair using your arms (e.g., wheelchair, or bedside chair)? 1 - Total   5. To walk in hospital room? 1 - Total   6. Climbing 3-5 steps with a railing? 1 - Total   Basic Mobility Raw Score (Score out of 24.Lower scores equate to lower levels of function) 6   Total Evaluation Time   Total Evaluation Time (Minutes) 20     "

## 2018-10-21 NOTE — PROVIDER NOTIFICATION
MD notified of DALY and BPs, Awaiting callback.     1405: order for NS bolus, lactic acid and hgb recheck this chikis.

## 2018-10-21 NOTE — PLAN OF CARE
Problem: Patient Care Overview  Goal: Plan of Care/Patient Progress Review  Outcome: Improving  Pt remains A/O, forgetful lethargic. A2 repo in bed. BPs low hospitalist aware hgb 7.8 1 unit of blood given, recheck this evening. DALY, bolus given, IVF after bolus. Lactic acid level pending. 1L O2. Tele 100% v-paced. Mod cho. Hip precautions. Aquacel. MOM given x1 per pt, no results. Continue to monitor.

## 2018-10-21 NOTE — PROVIDER NOTIFICATION
Prescriber Notification Note    The pharmacist has communicated with this patient's provider regarding a concern or therapy recommendation.    Notified Person: Ian  Date/Time of Notification: 10/21/18 1446  Interaction: text page  Concern/Recommendation:scr increased, milk of magnesia may result in Mg accumulation     Comments/Additional Details: discontinued

## 2018-10-21 NOTE — PROGRESS NOTES
[facility]    Hospitalist Progress Note      Assessment & Plan    Helene Gibbs is a 83 year old female who presents with a mechanical fall at home found to have a left femoral neck fracture.  Generally feeling unwell for the past week with no clear localizing symptoms.  Recently underwent root canal of left lower bicuspid.  On clindamycin per recommendation of outpatient OMFS.     Right femoral neck fracture:   Postoperative day # 1 left hip hemiarthroplasty, management per the orthopedic service    Acute on chronic blood loss anemia  Hypovolemic hypotension  Patient with a history of melenic stool, none recently.  Note hemoglobin of 9.3 today as compared to 14.1 8/9/18.  Over the past year has undergone colonoscopy as well as EGD.  Polyps on colonoscopy in October 2017, EGD unremarkable other than some antral gastritis.  Some concern that ongoing anemia related to GI blood loss in the setting of chronic antico regulation.  Resume H2 blocker  Monitor hemoglobin   10/21-the patient continues to have low blood pressure.  And her hemoglobin is 7.8 g this morning.  Due to the hypotension and acute kidney injury she will be transfused 1 unit of packed red blood cells.  Continue to monitor hemoglobin.  Hold blood pressure medications.    Acute kidney injury  Stage 3 chronic kidney disease   Patient with a creatinine of 1.8 from baseline of 1.04 well.  Suspect prerenal, though no recent medication changes and patient without reports of intake change.   Prior to admission lisinopril 10 mg daily held, prior to admission furosemide 20 mg daily held  Trend BMP  10/21-creatinine is up to 2.11 today likely due to hypotension.  Blood pressure medicine is being held.  She is also going to receive a transfusion.  Continue to monitor her creatinine.      Asymptomatic bacteriuria  Culture is growing less than 10,000 colonies per mL gram-negative rods and patient is asymptomatic.  No treatment  required.    Peripheral vascular disease with left femoral artery stent  Coronary artery disease:   Patient with a troponin elevation as well as abnormal stress imaging 5/7/18.  Note small area of apical ischemia of moderate intensity at that time.  Decision was for ongoing medical management.  Increases perioperative risk.  Continue prior to admission 81 mg daily aspirin  Holding prior to admission apixaban  Metoprolol now on hold due to hypotension  Prior to admission lisinopril on hold given acute kidney injury     Atrial fibrillation, permanent on anticoagulation   Status post pacemaker following AV node ablation.  Prior to admission apixaban 2.5 mg twice daily held.  Anticipate operative intervention on hip fracture.    Metoprolol is being held due to low blood pressure.     Dental abscess:   Patient with recent root canal of left lower bicuspid, right upper second molar fractured.  Continue clindamycin, converted to IV (continuation was recommended by outpatient OMFS)      Left lobe atelectasis versus pneumonia:   Patient with generalized weakness and feeling unwell.  Chronic cough which is not significantly changed.  Reportedly had some mild hypoxia in the emergency department which subsequently resolved with IV fluid administration. No fevers or chills.  Pro calcitonin was less than 0.05    Type 2 diabetes:  Prior to admission glipizide 5 mg daily held  Medium dose sliding scale insulin while n.p.o.  glucometers are good today     Liver transaminitis  Patient with ALT and AST elevated to the 300 range.  No clear etiology for this.  Patient has been feeling generally unwell for the past few days.  With acute creatinine elevation, possibly related to volume depletion and possible hypoperfusion.  No abdominal pain.  Follow CMP  10/21-ALT is about the same today at 223 and AST is down from 300 to 166.  Continue to monitor per     History of heart failure preserved ejection fraction  Last TTE in March 2018 with  an ejection fraction of 50-55%, severe biatrial enlargement, 2+ tricuspid regurgitation with mild pulmonary hypertension.  Prior to admission Lasix held  Prior to admission lisinopril held  Continue prior to admission metoprolol   10/21-above medications are all being held due to low blood pressure     COPD  Continue Duonebs  Continue PRN albuterol nebulizer treatments  Holding prior to admission Spiriva while on scheduled DuoNebs    DVT Prophylaxis:   PCD's     Code Status:   DNR/DNI    Disposition:   Expected discharge to transitional care unit when ok with orthopedist     Antonio Sosa MD  Text Page (7am - 6pm)    Interval History   The patient has some chronic back pain.  Her surgical pain is under control.    -Data reviewed today: I reviewed all new labs and imaging results over the last 24 hours. I personally reviewed the chest x-ray image(s) showing obscuration of right hemidiaphragm.    Physical Exam   Temp: 98.2  F (36.8  C) Temp src: Axillary BP: (!) 78/45 Pulse: 70 Heart Rate: 74 Resp: 16 SpO2: 93 % O2 Device: Nasal cannula Oxygen Delivery: 1 LPM  Vitals:    10/19/18 2203 10/20/18 0523 10/21/18 0621   Weight: 72.6 kg (160 lb) 72.6 kg (160 lb) 74.4 kg (164 lb)     Vital Signs with Ranges  Temp:  [97.4  F (36.3  C)-98.8  F (37.1  C)] 98.2  F (36.8  C)  Pulse:  [68-70] 70  Heart Rate:  [67-74] 74  Resp:  [9-18] 16  BP: ()/(43-70) 78/45  SpO2:  [91 %-98 %] 93 %  I/O last 3 completed shifts:  In: 1950 [I.V.:1700]  Out: 575 [Urine:325; Blood:250]    Constitutional: Awake, alert, cooperative, no apparent distress  Respiratory: Clear to auscultation bilaterally, no crackles or wheezing  Cardiovascular: Regular rate and rhythm, normal S1 and S2, and no murmur noted  GI: decreased bowel sounds, soft, non-distended, non-tender  Skin/Integumen: No rashes, no cyanosis, no edema  Other:     Medications     lactated ringers 75 mL/hr at 10/20/18 0742       acetaminophen  975 mg Oral Q8H     albuterol  2.5  mg Nebulization 4x daily     aspirin  81 mg Oral Daily     clindamycin  300 mg Oral TID     guaiFENesin  600 mg Oral BID     insulin aspart  1-3 Units Subcutaneous TID AC     insulin aspart  1-3 Units Subcutaneous At Bedtime     metoprolol tartrate  25 mg Oral BID     pregabalin  75 mg Oral BID     ranitidine  150 mg Oral At Bedtime     senna-docusate  1 tablet Oral BID    Or     senna-docusate  2 tablet Oral BID     simvastatin  10 mg Oral At Bedtime     sodium chloride (PF)  3 mL Intracatheter Q8H     sodium chloride (PF)  3 mL Intracatheter Q8H     umeclidinium  1 puff Inhalation QPM       Data     Recent Labs  Lab 10/21/18  0657 10/20/18  1604 10/20/18  1015 10/19/18  2156   WBC 11.6*  --   --  9.7   HGB 7.8* 7.7* 8.1* 9.3*   MCV 92  --   --  93     --   --  260   INR  --   --   --  1.54*     --   --  135   POTASSIUM 5.0  --   --  4.4   CHLORIDE 100  --   --  96   CO2 28  --   --  30   BUN 42*  --   --  43*   CR 2.11*  --   --  1.80*   ANIONGAP 7  --   --  9   GERARD 8.3*  --   --  8.6   *  --   --  178*   ALBUMIN 2.7*  --   --  3.1*   PROTTOTAL 5.8*  --   --  6.8   BILITOTAL 0.7  --   --  0.7   ALKPHOS 79  --   --  98   *  --   --  255*   *  --   --  300*       No results found for this or any previous visit (from the past 24 hour(s)).

## 2018-10-21 NOTE — PLAN OF CARE
Problem: Patient Care Overview  Goal: Plan of Care/Patient Progress Review  Outcome: No Change  Pt sleeping in between cares. A/Ox4. Denies pain in left hip, but did request PRN oxycodone for chronic back pain. Baseline neuropathy. Pt given bolus this shift for low blood pressure and low urine output. IV continues to infuse at 75cc/hr.

## 2018-10-21 NOTE — PLAN OF CARE
Problem: Patient Care Overview  Goal: Plan of Care/Patient Progress Review  Discharge Planner PT   Patient plan for discharge: TCU  Current status: PT consult received. Chart reviewed. PT evaluation and treatment initiated. 84y/o F with fall at home with left femoral neck fx, POD #1 left hip hemiarthroplasty, pt also with generalized weakness and not feeling well following a root canal just prior to fall, hx of COPD, NSTEMI, pacemaker (see chart for full PMHx). Orders for WBAT, posterior hip precautions. PLOF: Pt reports recently moved to senior living apartments. Pt reports mod IND with her FWW and only gets assist 1x/week with bathing. At the time of evaluation pt's BP noted to be low at 81/47mmHg, HR=67 and SpO2=90% on RA. Pt demonstrates global weakness, requires max A x 2 for repositioning in bed. Pt denies hip pain, however reports low back pain (chronic).  Deferred EOB/OOB mobility during encounter due to low BP.  Initiated LE supine exercises and education with weightbearing status and hip precautions.  Barriers to return to prior living situation: Low BP, impaired strength, level of assist required, lives alone in senior living apt.   Recommendations for discharge: TCU  Rationale for recommendations: Pt will continue to benefit from skilled PT to maximize pt's level of IND mobility prior to return to her home.        Entered by: Judy Gallagher 10/21/2018 1:06 PM         PT: Attempted to see for BID tx.  Discussed with RN to hold PM session today as pt continue with low BP and currently receiving blood transfusion.  Will reschedule pt for tomorrow.

## 2018-10-21 NOTE — PLAN OF CARE
Problem: Patient Care Overview  Goal: Plan of Care/Patient Progress Review  Outcome: No Change  Pt is A&Ox3. VSS on 2 L of oxygen NC, afebrile. Pain managed by PO medications. CMS+ and Aquacel C/D/I. Montano catheter patent and in place. Will continue to monitor.

## 2018-10-21 NOTE — PLAN OF CARE
Problem: Patient Care Overview  Goal: Plan of Care/Patient Progress Review  OT: Pt's BP at 77/44, not appropriate for OOB activity at this time, will reschedule OT eval.

## 2018-10-21 NOTE — PROGRESS NOTES
Orthopedic Surgery  Helene Gibbs  10/21/2018  Admit Date:  10/19/2018  POD # 1  S/P left hip hemiarthroplasty     Patient resting comfortably in bed.    Pain controlled.  Tolerating oral intake.    Denies nausea or vomiting  Denies chest pain or shortness of breath  No events overnight.      Alert and orient to person, place, and time.  Vital Sign Ranges  Temperature Temp  Av  F (36.7  C)  Min: 97.4  F (36.3  C)  Max: 98.8  F (37.1  C)   Blood pressure Systolic (24hrs), Av , Min:77 , Max:130        Diastolic (24hrs), Av, Min:43, Max:61      Pulse Pulse  Av.4  Min: 68  Max: 70   Respirations Resp  Avg: 15.6  Min: 14  Max: 18   Pulse oximetry SpO2  Av %  Min: 91 %  Max: 97 %       Dressing is clean, dry, and intact.   Minimal erythema of the surrounding skin.   Bilateral calves are soft, non-tender.  Bilateral lower extremity is NVI.  Sensation to light touch diminished in bilateral lower extremities, at baseline per the patient   5/5 motor with resisted dorsi and plantar flexion bilaterally  +Dp pulse     Labs:  Recent Labs   Lab Test  10/21/18   0657  10/19/18   2156  07/22/18   0555   POTASSIUM  5.0  4.4  4.3     Recent Labs   Lab Test  10/21/18   1520  10/21/18   0657  10/20/18   1604   HGB  9.0*  7.8*  7.7*     Recent Labs   Lab Test  10/19/18   2156  18   0800  18   1101   INR  1.54*  1.00  0.97     Recent Labs   Lab Test  10/21/18   0657  10/19/18   2156  18   1332   PLT  217  260  254       A/P  1. Plan   DVT prophylaxis - per medicine team, SCDs      Mobilize with PT/OT    WBAT   Continue current pain regiment.    2. Disposition   Anticipate d/c to home vs acute rehab, likely 1-2 days.     Milka Anne PA-C

## 2018-10-22 PROBLEM — Z71.89 ACP (ADVANCE CARE PLANNING): Chronic | Status: RESOLVED | Noted: 2017-08-29 | Resolved: 2018-01-01

## 2018-10-22 NOTE — PROVIDER NOTIFICATION
October 22, 2018  Patient's nurse, Rafa MORRIS,requested charge nurse to look at left hip aquacell dressing. Dressing does show drainage on [facility] part over 3/4, but looks to be old drainage. Notified ARJUN Garcia and decision was made to wait with changing this dressing on Tuesday, Oct 23 when physician or PA here to assess patient. Carrol Villar RN, ONC

## 2018-10-22 NOTE — PLAN OF CARE
Problem: Patient Care Overview  Goal: Plan of Care/Patient Progress Review  Discharge Planner PT   Patient plan for discharge: TCU  Current status:     Pt continues to be fatigued and somewhat lethargic during session, intermittently falling asleep however agreeable to therapy and wanting to perform exercises and try sitting EOB. BP monitored closely. Reviewed CHARLIE precautions, pt unable to recall without assist. Pt engaged in supine exercises, increased time needed as pt required frequent redirection to task. Pt is orthostatic with position change, RN aware. Supine > sit with Max A x 1 and 100% cues for technique. Min A x 1 scooting towards EOB. Pt with significant fwd head position. Pt unable to correct posture with cues. Pt reported dizziness, BP taken and noted to be 79/39, HR 56. Sit > supine with total A x 2. Pt required total A x 2 for scooting up in bed. Rolling within precautions and max a x 2 to place mann appropriately. Pt left with nursing staff end of session. Limited by hip pain, chronic LBP, low BP and dizziness. Not safe to stand at this time     Barriers to return to prior living situation: Dizziness, medical stability, A x 2 for mobility, unable to attempt standing safely 2/2 low BP and dizziness, pt has difficulty maintaining upright posture sitting EOB at this time. AAROM for SLR exercise due to weakness. Continues to be limited by dizziness, weakness, chronic LBP and L hip pain   Recommendations for discharge: TCU  Rationale for recommendations: Pt will require TCU stay to improve above impairments          Entered by: Mariela Ahmadi 10/22/2018 11:53 AM

## 2018-10-22 NOTE — PROVIDER NOTIFICATION
Notified provider in regards to patient's low urine output and need to keep in the floyd catheter for I & Os. New orders issued by Dr. Fairchild.

## 2018-10-22 NOTE — PLAN OF CARE
Problem: Patient Care Overview  Goal: Plan of Care/Patient Progress Review  Outcome: No Change  Pt is A&O, VSS on 1 L of oxygen except soft B/P. Assist of 2 to reposition and weight shift in bed. Baseline neuropathy in BLE, aquacel drsg C/D/I. Pain managed by oxycodone and Tylenol. Montano catheter in place, low urine output. Provider notified and new orders placed. Will continue to monitor.

## 2018-10-22 NOTE — PLAN OF CARE
Problem: Patient Care Overview  Goal: Plan of Care/Patient Progress Review  Outcome: No Change  Pt has been lethargic most of the shift, sleeping on and off between care. VSS on 1L NC except for hypotensive at times. Orthostatic hypotension noted. Pain in the left hip managed with scheduled tylenol and repositioning. Dressing on the left hip with dried drainage, A little oozing from the side of the aqua gel dressing noted. Ortho PA notified, plan for dressing change tomorrow. Pt unable to complete therapy today due to drowsiness and low BP. Montano catheter with low urine output. MD aware. Plan for NS bolus later today. On mod carb diet. BG check. Discharge pending progress. Continue to monitor.

## 2018-10-22 NOTE — PROGRESS NOTES
[facility] Orthopedic Progress Note  Helene Gibbs is a 83 year old female    Today's Date:10/22/2018  Admission Date: 10/19/2018  Renal insufficiency [N28.9]  Elevated LFTs [R94.5]  Dental infection [K04.7]  Closed fracture of left hip, initial encounter (H) [S72.002A]  Closed head injury, initial encounter [S09.90XA]  Chronic obstructive pulmonary disease, unspecified COPD type (H) [J44.9]  Anemia, unspecified type [D64.9]  POD # 2 Left bipolar hemiarthroplasty for femoral neck fracture.     Patient Seen.  Doing well.  Confused.  Dressings are clean, dry, and intact.  Circulation, motor and sensory function, intact distally.        PLAN:    Pain control medication: No changes.  Discharge plan: TCU Tomorrow if medically stable.      Temp:  [97.4  F (36.3  C)-98.9  F (37.2  C)] 98.9  F (37.2  C)  Pulse:  [69-72] 72  Heart Rate:  [63-70] 69  Resp:  [14-18] 16  BP: ()/(43-62) 110/62  SpO2:  [91 %-96 %] 94 %      Results for orders placed or performed during the hospital encounter of 10/19/18 (from the past 24 hour(s))   Glucose by meter   Result Value Ref Range    Glucose 171 (H) 70 - 99 mg/dL   Lactic acid   Result Value Ref Range    Lactic Acid 2.1 (H) 0.4 - 2.0 mmol/L   Hemoglobin   Result Value Ref Range    Hemoglobin 9.0 (L) 11.7 - 15.7 g/dL   Glucose by meter   Result Value Ref Range    Glucose 173 (H) 70 - 99 mg/dL   Glucose by meter   Result Value Ref Range    Glucose 179 (H) 70 - 99 mg/dL   Glucose by meter   Result Value Ref Range    Glucose 203 (H) 70 - 99 mg/dL   Basic metabolic panel   Result Value Ref Range    Sodium 134 133 - 144 mmol/L    Potassium 5.3 3.4 - 5.3 mmol/L    Chloride 100 94 - 109 mmol/L    Carbon Dioxide 26 20 - 32 mmol/L    Anion Gap 8 3 - 14 mmol/L    Glucose 152 (H) 70 - 99 mg/dL    Urea Nitrogen 52 (H) 7 - 30 mg/dL    Creatinine 3.04 (H) 0.52 - 1.04 mg/dL    GFR Estimate 15 (L) >60 mL/min/1.7m2    GFR Estimate If Black 18 (L) >60 mL/min/1.7m2    Calcium 8.1 (L) 8.5 -  10.1 mg/dL   CBC with platelets   Result Value Ref Range    WBC 10.5 4.0 - 11.0 10e9/L    RBC Count 3.08 (L) 3.8 - 5.2 10e12/L    Hemoglobin 8.9 (L) 11.7 - 15.7 g/dL    Hematocrit 28.1 (L) 35.0 - 47.0 %    MCV 91 78 - 100 fl    MCH 28.9 26.5 - 33.0 pg    MCHC 31.7 31.5 - 36.5 g/dL    RDW 15.6 (H) 10.0 - 15.0 %    Platelet Count 195 150 - 450 10e9/L     *Note: Due to a large number of results and/or encounters for the requested time period, some results have not been displayed. A complete set of results can be found in Results Review.         Ruel Humphrey

## 2018-10-22 NOTE — PROGRESS NOTES
[facility]    Hospitalist Progress Note      Assessment & Plan   Helene Gibbs is a 83 year old female who was admitted on 10/19/2018.  Past history of CKD, CHF, COPD, DM II, HTN, A-fib s/p AVN ablation and PPM, PVD who presents with mechanical fall, left femoral neck fracture s/p right hemiarthroplasty.          Right femoral neck fracture s/p right hemiarthroplasty 10/20:   - management per the orthopedic service     Acute on chronic blood loss anemia  Hypovolemic hypotension  Patient with a history of melenic stool, none recently with hgb wnl as recently as 8/9/18.  Admission hgb 9.3 fell to 7.7 POD1 with associated hypotension s/p 1 unit PRBC on 10/21/18.  Has had EGD and colonoscopy in the past year.  Some concern that ongoing anemia related to GI blood loss in the setting of chronic anticoagulation.  - hgb stable at 9 g/dL on 10/23, pressures improved but orthostatics remain positive  - continue PTA ranitidine     Oliguric acute kidney injury on CKD stage III; probable ATN  Baseline Cr 1.1, admission Cr 1.8 and trending up.  Likely combination of pre-renal status in setting of acute blood loss and hypotension.  Notably, required pressor support intra-operatively and intermittently hypotensive post-op.  No nephrotoxic meds (gentamicin on MAR was intra-op washing) or contrast exposure.    - Cr trending up at 3.04 on 10/22 (anephric rise)  - repeat UA  - K 5.3; stop LR, start NS 75 ml/hr    - orthostatics remain positive 10/23; cautious bolus  ml x1 and 1 unit PRBC (has mild basilar crackles on exam)  - check FENA  - daily BMP  - with degree of renal injury, will ask Neph to follow along as concerned about potential need for dialysis    Liver transaminitis  AST and ALT elevated at admission.  Question if due to hypoperfusion.  No abdominal pain.  - repeat LFT's in AM     Peripheral vascular disease with left femoral artery stent  Coronary artery disease  Chronic diastolic CHF  Atrial  fibrillation s/p AVN ablation with PPM, permanent on anticoagulation   Last TTE in March 2018 with an ejection fraction of 50-55%, severe biatrial enlargement, 2+ tricuspid regurgitation with mild pulmonary hypertension.  Patient with a troponin elevation as well as abnormal stress imaging 5/7/18 (small area of apical ischemia of moderate intensity) medically managed.    - hold PTA lasix, aspirin and lisinopril due to RICK  - hold PTA metoprolol due to low blood pressure  - hold apixaban due to acute blood loss      Dental abscess:   Patient with recent root canal of left lower bicuspid, right upper second molar fractured.  - Continue clindamycin (continuation was recommended by outpatient OMFS)      Type 2 diabetes:  A1C 5.5% this admission.  Managed on glipizide alone.  - hold PTA glipizide  - continue med sliding scale insulin    Chronic back pain  - she reports Lyrica has not been helpful with this and is OK holding this medication in setting of RICK, have discontinued      COPD  - continue Incruse-Ellipta (sub for Spiriva), albuterol neb qid      DVT Prophylaxis: Pneumatic Compression Devices  Code Status: DNR/DNI    Disposition: Expected discharge several days once renal function improved.    Ervin Lares    Interval History   Denies any dyspnea, chest pressure or lightheadedness.  Complains of constipation, no abdominal pain.  No fever/chills.  Has chronic back pain which has been exacerbated by immobility.  Feels sleepy/groggy.    -Data reviewed today: I reviewed all new labs and imaging results over the last 24 hours. I personally reviewed no images or EKG's today.    Physical Exam   Temp: 98.9  F (37.2  C) Temp src: Oral BP: 126/63 Pulse: 72 Heart Rate: 69 Resp: 16 SpO2: 95 % O2 Device: Nasal cannula Oxygen Delivery: 1 LPM  Vitals:    10/19/18 2203 10/20/18 0523 10/21/18 0621   Weight: 72.6 kg (160 lb) 72.6 kg (160 lb) 74.4 kg (164 lb)     Vital Signs with Ranges  Temp:  [97.6  F (36.4  C)-98.9  F (37.2  C)]  98.9  F (37.2  C)  Pulse:  [70-72] 72  Heart Rate:  [63-70] 69  Resp:  [14-18] 16  BP: ()/(39-63) 126/63  SpO2:  [91 %-96 %] 95 %  I/O last 3 completed shifts:  In: -   Out: 100 [Urine:100]    Constitutional: Well developed, obese female in no acute distress  Respiratory: Mild bibasilar crackles, no wheezing or tachypnea  Cardiovascular: regular rate and rhythm, normal S1/S2 with grade 2/6 systolic murmur, trace peripheral edema  GI: abdomen soft, non-tender, non-distended, normal bowel sounds  Skin/Integumen:  No rash or bruising  Other:  slightly lethargic but arouses appropriately and oriented x3 and to situation, cranial nerves grossly intact    Medications     lactated ringers 75 mL/hr at 10/22/18 1146       acetaminophen  975 mg Oral Q8H     albuterol  2.5 mg Nebulization 4x daily     aspirin  81 mg Oral Daily     clindamycin  300 mg Oral TID     guaiFENesin  600 mg Oral BID     insulin aspart  1-3 Units Subcutaneous TID AC     insulin aspart  1-3 Units Subcutaneous At Bedtime     metoprolol tartrate  25 mg Oral BID     pregabalin  75 mg Oral BID     ranitidine  150 mg Oral At Bedtime     senna-docusate  1 tablet Oral BID    Or     senna-docusate  2 tablet Oral BID     simvastatin  10 mg Oral At Bedtime     sodium chloride (PF)  3 mL Intracatheter Q8H     sodium chloride (PF)  3 mL Intracatheter Q8H     umeclidinium  1 puff Inhalation QPM       Data     Recent Labs  Lab 10/22/18  0711 10/21/18  1520 10/21/18  0657  10/19/18  2156   WBC 10.5  --  11.6*  --  9.7   HGB 8.9* 9.0* 7.8*  < > 9.3*   MCV 91  --  92  --  93     --  217  --  260   INR  --   --   --   --  1.54*     --  135  --  135   POTASSIUM 5.3  --  5.0  --  4.4   CHLORIDE 100  --  100  --  96   CO2 26  --  28  --  30   BUN 52*  --  42*  --  43*   CR 3.04*  --  2.11*  --  1.80*   ANIONGAP 8  --  7  --  9   GERARD 8.1*  --  8.3*  --  8.6   *  --  125*  --  178*   ALBUMIN  --   --  2.7*  --  3.1*   PROTTOTAL  --   --  5.8*  --   6.8   BILITOTAL  --   --  0.7  --  0.7   ALKPHOS  --   --  79  --  98   ALT  --   --  223*  --  255*   AST  --   --  166*  --  300*   < > = values in this interval not displayed.    No results found for this or any previous visit (from the past 24 hour(s)).

## 2018-10-22 NOTE — PLAN OF CARE
Problem: Patient Care Overview  Goal: Plan of Care/Patient Progress Review  Discharge Planner OT   Patient plan for discharge: None stated  Current status: Following chart review pt is below baseline for IND in ADLs and functional mobility, TCU has been recommended by PT and pt is to discharge to TCU tomorrow 10/24/18. Will defer OT eval to next level of care. IP needs met with PT.   Barriers to return to prior living situation: Defer to PT  Recommendations for discharge: Defer to PT  Rationale for recommendations:  OT eval most appropriate at next level of care.  See above. Orders completed.        Entered by: Azul Veliz 10/22/2018 2:31 PM

## 2018-10-23 NOTE — PROGRESS NOTES
[facility]    Hospitalist Progress Note      Assessment & Plan   Helene Gibbs is a 83 year old female who was admitted on 10/19/2018.  Past history of CKD, CHF, COPD, DM II, HTN, A-fib s/p AVN ablation and PPM, PVD who presents with mechanical fall, left femoral neck fracture s/p right hemiarthroplasty.          Right femoral neck fracture s/p right hemiarthroplasty 10/20:   - management per the orthopedic service     Acute on chronic blood loss anemia  Hypovolemic hypotension  Patient with a history of melenic stool, none recently with hgb wnl as recently as 8/9/18.  Admission hgb 9.3 fell to 7.7 POD1 with associated hypotension s/p 1 unit PRBC on 10/21/18 and 10/22.  Has had EGD and colonoscopy in the past year.  Some concern that ongoing anemia related to GI blood loss in the setting of chronic anticoagulation.  - pressures stable 's past 24 hours  - hgb 10/23 pending  - continue PTA ranitidine     Oliguric acute kidney injury on CKD stage III  Baseline Cr 1.1, admission Cr 1.8 and trending up.  Likely combination of pre-renal status in setting of acute blood loss and hypotension.  Notably, required pressor support intra-operatively and intermittently hypotensive post-op.  No nephrotoxic meds (gentamicin on MAR was intra-op washing) or contrast exposure.  UA 10/22 with mild albuminuria, otherwise unremarkable.  - Cr stable at 3 on 10/23; FENA labs yesterday consistent with pre-renal  - cancel Neph consult  - increase IVF to 125 ml/hr; repeat Na and Cr this afternoon  - daily BMP    Liver transaminitis  AST and ALT elevated at admission.  Question if due to hypoperfusion.  No abdominal pain.  - AST trending down, ALT stable 10/23; monitor intermittently     Peripheral vascular disease with left femoral artery stent  Coronary artery disease  Chronic diastolic CHF  Atrial fibrillation s/p AVN ablation with PPM, permanent on anticoagulation   Last TTE in March 2018 with an ejection fraction  of 50-55%, severe biatrial enlargement, 2+ tricuspid regurgitation with mild pulmonary hypertension.  Patient with a troponin elevation as well as abnormal stress imaging 5/7/18 (small area of apical ischemia of moderate intensity) medically managed.    - hold PTA lasix, aspirin and lisinopril due to RICK  - hold PTA metoprolol due to low blood pressure  - hold apixaban due to acute blood loss      Dental abscess:   Patient with recent root canal of left lower bicuspid, right upper second molar fractured.  - Continue clindamycin (continuation was recommended by outpatient OMFS)      Type 2 diabetes:  A1C 5.5% this admission.  Managed on glipizide alone.  - hold PTA glipizide  - continue med sliding scale insulin    Chronic back pain  - Lyrica stopped due to RICK      COPD  - continue Incruse-Ellipta (sub for Spiriva), albuterol neb qid      DVT Prophylaxis: cautiously start subcutaneous heparin as high risk  Code Status: DNR/DNI    Disposition: Expected discharge 2-3 days once renal function improved.    Ervin Lares    Interval History   Primary complaint is constipation, reporting only milk of mag will work for her.  No dyspnea, no chest pressure, no fever/chills.  Feels stiff but denies any significant pain.  No other complaints.    -Data reviewed today: I reviewed all new labs and imaging results over the last 24 hours. I personally reviewed no images or EKG's today.    Physical Exam   Temp: 98  F (36.7  C) Temp src: Oral BP: 105/55 Pulse: 70 Heart Rate: 69 Resp: 16 SpO2: 96 % O2 Device: Nasal cannula Oxygen Delivery: 2 LPM  Vitals:    10/20/18 0523 10/21/18 0621 10/23/18 0625   Weight: 72.6 kg (160 lb) 74.4 kg (164 lb) 76.6 kg (168 lb 12.8 oz)     Vital Signs with Ranges  Temp:  [97.8  F (36.6  C)-98.9  F (37.2  C)] 98  F (36.7  C)  Pulse:  [70-71] 70  Heart Rate:  [69-73] 69  Resp:  [14-16] 16  BP: ()/(39-64) 105/55  SpO2:  [91 %-100 %] 96 %  I/O last 3 completed shifts:  In: 1307.5 [P.O.:520;  I.V.:487.5]  Out: 400 [Urine:400]  Constitutional: Well developed, obese female in no acute distress, sitting up in chair  Respiratory: Mild bibasilar crackles, no wheezing or tachypnea  Cardiovascular: regular rate and rhythm, normal S1/S2 with grade 2/6 systolic murmur  GI: abdomen soft, non-tender, non-distended, normal bowel sounds  Lymph:  Trace edema of bilateral lower extremities and left upper extremity  Other:  alert and appropriate today, cranial nerves grossly intact    Medications     sodium chloride 75 mL/hr at 10/23/18 0958       acetaminophen  975 mg Oral Q8H     albuterol  2.5 mg Nebulization 4x daily     clindamycin  300 mg Oral TID     guaiFENesin  600 mg Oral BID     insulin aspart  1-3 Units Subcutaneous TID AC     insulin aspart  1-3 Units Subcutaneous At Bedtime     ranitidine  150 mg Oral At Bedtime     senna-docusate  1 tablet Oral BID    Or     senna-docusate  2 tablet Oral BID     simvastatin  10 mg Oral At Bedtime     sodium chloride (PF)  3 mL Intracatheter Q8H     sodium chloride (PF)  3 mL Intracatheter Q8H     umeclidinium  1 puff Inhalation QPM       Data     Recent Labs  Lab 10/23/18  0722 10/22/18  1634 10/22/18  0711 10/21/18  1520 10/21/18  0657  10/19/18  2156   WBC  --   --  10.5  --  11.6*  --  9.7   HGB  --   --  8.9* 9.0* 7.8*  < > 9.3*   MCV  --   --  91  --  92  --  93   PLT  --   --  195  --  217  --  260   INR  --   --   --   --   --   --  1.54*   * 133 134  --  135  --  135   POTASSIUM 5.0 5.5* 5.3  --  5.0  --  4.4   CHLORIDE 101 101 100  --  100  --  96   CO2 26 26 26  --  28  --  30   BUN 57* 52* 52*  --  42*  --  43*   CR 3.03* 3.24* 3.04*  --  2.11*  --  1.80*   ANIONGAP 5 6 8  --  7  --  9   GERARD 7.9* 8.3* 8.1*  --  8.3*  --  8.6   * 167* 152*  --  125*  --  178*   ALBUMIN 2.4*  --   --   --  2.7*  --  3.1*   PROTTOTAL 5.7*  --   --   --  5.8*  --  6.8   BILITOTAL 0.8  --   --   --  0.7  --  0.7   ALKPHOS 105  --   --   --  79  --  98   *  --    --   --  223*  --  255*   *  --   --   --  166*  --  300*   < > = values in this interval not displayed.    No results found for this or any previous visit (from the past 24 hour(s)).

## 2018-10-23 NOTE — PLAN OF CARE
Problem: Patient Care Overview  Goal: Plan of Care/Patient Progress Review  Outcome: No Change  Pt denied pain. C/O not having BM since this hospitalization. MOM 30 ml (ok'd by pharmacy per hospitalist) ordered. Generalized edema, worst on (L) side. Up with a ceiling lift. Fair appetite. Montano intact and patent, is on strict I and O.

## 2018-10-23 NOTE — PLAN OF CARE
Problem: Patient Care Overview  Goal: Plan of Care/Patient Progress Review  Outcome: No Change  VSS. Baseline numbness BLE. Generalized edema. Fine crackle BLL.Up with ceiling lift. Pain well controlled with oxycodone and tylenol. Alert and orient x 4.

## 2018-10-23 NOTE — CONSULTS
Care Transition Initial Assessment -   Reason For Consult: discharge planning  Met with: Patient   Active Problems:    Leg fracture, left       DATA  Lives With: alone  Living Arrangements: apartment    Identified issues/concerns regarding health management: discharge planning    Transportation Available: family or friend will provide    ASSESSMENT  Cognitive Status:  awake and alert  Concerns to be addressed:     Per SW consult for discharge planning. Pt admitted 10/20 with a left leg fracture. Pt will tentatively discharge 10/25. Reviewed medical record. Pt was previously living in a senior apartment with bathing and housekeeping services. Pt's senior apartment can provide assisted living services if needed. Met with pt to discuss discharge planning. Pt is a BPCI pt and has already picked [facility] for TCU. SW sent referral via [facility] to [facility]. SW will follow-up with pt after hearing back from [facility]. SW will also arrange for transportation once discharge date is known.     PLAN  Financial costs for the patient includes: n/a   Patient given options and choices for discharge: BPCI pt-already chose [facility]  Patient/family is agreeable to the plan?  Yes  Patient Goals and Preferences: [facility]  Patient anticipates discharging to: [facility]     Will continue to follow and support a safe discharge plan    Ирина Thomas MSW, LGSW

## 2018-10-23 NOTE — PLAN OF CARE
Problem: Patient Care Overview  Goal: Plan of Care/Patient Progress Review  Outcome: No Change  A&O.  VSS, ex hypotensive at times.  CMS intact.  Dressing with moderate amount of drainage and oozing, reinforced, ortho aware, will see pt in morning.  Pain managed with tylenol and oxy.  Generalized +2 to +3 edema.  Low urine output, floyd clamped to collect UA.  LS fine crackles in LL.  500 ml NS bolous given over 4hrs.  Hgb 8.9, RBC infusing 75 ml/hr, need to order lab once blood is done.  Creatinine 3.24, Neph consult pending.  Tele 100% V-paced.  Up with heavy assist of 2, reposition q2hr.  BG check, insulin per order.  Continue to monitor.

## 2018-10-23 NOTE — PLAN OF CARE
Problem: Patient Care Overview  Goal: Plan of Care/Patient Progress Review  Discharge Planner PT   Patient plan for discharge: TCU, per BPCI plan.  Current status: Pt in supine upon arrival of therapist. Pt participated in supine strengthening/ROM exercises. Patient performed supine-sit with modA of 2, assist to bring L LE to EOB. Patient tolerated sitting EOB with SBA. Patient declined attempting to stand. Patient transferred to bedside recliner with highback sling and ceiling lift.   Barriers to return to prior living situation: Level of assist-currently requiring use of lift, Pain, Fall risk  Recommendations for discharge: TCU  Rationale for recommendations: Pt is below baseline in regards to mobility and will benefit from continued skilled PT intervention at TCU       Entered by: Makayla Leyva 10/23/2018 1:20 PM

## 2018-10-24 NOTE — PLAN OF CARE
"Problem: Patient Care Overview  Goal: Plan of Care/Patient Progress Review  Outcome: Improving  Pt is A+OX4, VSS on 2L NC. Tele 100% vpaced. Patient complaining of constipation- given PRN suppository with no results. On-call hospitalist contacted and tap water enema given. Minimal results with tap water enema, but patient reports feeling \"a little less full\" afterwards. Up with assist of 2 using lift. Pain controlled on PRN oxycodone and tylenol. Mod carb diet. 0200 blood sugar 142. Montano patent with adequate output. Generalized edema appears to be worsening- IVF stopped. Hospitalist to address. Expected discharge to TCU in the next few days pending improvement.        "

## 2018-10-24 NOTE — PROGRESS NOTES
[facility]    Hospitalist Progress Note      Assessment & Plan   Helene Gibbs is a 83 year old female who was admitted on 10/19/2018.  Past history of CKD, CHF, COPD, DM II, HTN, A-fib s/p AVN ablation and PPM, PVD who presents with mechanical fall, left femoral neck fracture s/p right hemiarthroplasty.          Right femoral neck fracture s/p right hemiarthroplasty 10/20:   - management per the orthopedic service     Acute on chronic blood loss anemia  Hypovolemic hypotension  Patient with a history of melenic stool, none recently with hgb wnl as recently as 8/9/18.  Admission hgb 9.3 fell to 7.7 POD1 with associated hypotension s/p 1 unit PRBC on 10/21/18 and 10/22.  Has had EGD and colonoscopy in the past year.  Some concern that ongoing anemia related to GI blood loss in the setting of chronic anticoagulation.  - pressures have normalized past 24 hours  - hgb stabilizing 10 g/dL  - continue PTA ranitidine     Oliguric acute kidney injury on CKD stage III  Baseline Cr 1.1, admission Cr 1.8 and trending up.  Likely combination of pre-renal status in setting of acute blood loss and hypotension.  Notably, required pressor support intra-operatively and intermittently hypotensive post-op.  No nephrotoxic meds (gentamicin on MAR was intra-op washing) or contrast exposure.  UA 10/22 with mild albuminuria, otherwise unremarkable.  - Cr trending down at 2.3 on 10/23  - decrease IVF to 100 ml/hr  - daily BMP    Liver transaminitis  AST and ALT elevated at admission.  Question if due to hypoperfusion.  No abdominal pain.  - AST trending down, ALT stable 10/23; repeat tomorrow AM     Peripheral vascular disease with left femoral artery stent  Coronary artery disease  Chronic diastolic CHF  Atrial fibrillation s/p AVN ablation with PPM, permanent on anticoagulation   Last TTE in March 2018 with an ejection fraction of 50-55%, severe biatrial enlargement, 2+ tricuspid regurgitation with mild pulmonary  hypertension.  Patient with a troponin elevation as well as abnormal stress imaging 5/7/18 (small area of apical ischemia of moderate intensity) medically managed.    - hold PTA lasix and lisinopril due to RICK  - hold PTA metoprolol as remains normotensive  - resume aspirin 10/23; likely resume apixaban tomorrow if hgb stable      Dental abscess:   Patient with recent root canal of left lower bicuspid, right upper second molar fractured.  - Continue clindamycin (continuation was recommended by outpatient OMFS)      Type 2 diabetes:  A1C 5.5% this admission.  Managed on glipizide alone.  - hold PTA glipizide  - continue med sliding scale insulin    Chronic back pain  - Lyrica stopped due to RICK      COPD  - continue Incruse-Ellipta (sub for Spiriva), albuterol neb qid    oropharyngeal thrush:  Complaining or oral pain 10/24 with oral plaques noted.  - start nystatin swish and spit 10/24    Constipation  - increase sennakot to 2 tabs bid, increase miralax to bid, start daily dulcolax supp  - give tap water enema this evening if no results with supp      DVT Prophylaxis: subcutaneous heparin  Code Status: DNR/DNI    Disposition: Expected discharge 2 days pending further improvement in renal function, stable hgb.    Ervin Lares    Interval History   Complains of constipation with associated poor appetite and nausea.  No dyspnea.  Reports oral soreness.  No fever/chills or other complaints.    -Data reviewed today: I reviewed all new labs and imaging results over the last 24 hours. I personally reviewed no images or EKG's today.    Physical Exam   Temp: 98.2  F (36.8  C) Temp src: Oral BP: 119/64 Pulse: 70 Heart Rate: 69 Resp: 16 SpO2: 94 % O2 Device: None (Room air) Oxygen Delivery: 2 LPM  Vitals:    10/20/18 0523 10/21/18 0621 10/23/18 0625   Weight: 72.6 kg (160 lb) 74.4 kg (164 lb) 76.6 kg (168 lb 12.8 oz)     Vital Signs with Ranges  Temp:  [97.9  F (36.6  C)-98.9  F (37.2  C)] 98.2  F (36.8  C)  Pulse:  [70-71]  70  Heart Rate:  [69-70] 69  Resp:  [16-18] 16  BP: (117-128)/(62-68) 119/64  SpO2:  [91 %-100 %] 94 %  I/O last 3 completed shifts:  In: 2716 [P.O.:240; I.V.:2476]  Out: 1425 [Urine:1425]     Constitutional: Well developed, obese female in no acute distress, sitting up in bed  Respiratory: Mild bibasilar crackles, no wheezing or tachypnea  Cardiovascular: regular rate and rhythm, normal S1/S2 with grade 2/6 systolic murmur  GI: abdomen soft, non-tender, non-distended, normal bowel sounds  Lymph:  1+ edema of bilateral lower extremities  Other:  alert and appropriate, cranial nerves grossly intact    Medications     sodium chloride 100 mL/hr at 10/24/18 1100       albuterol  2.5 mg Nebulization 4x daily     clindamycin  300 mg Oral TID     guaiFENesin  600 mg Oral BID     heparin  5,000 Units Subcutaneous Q12H     insulin aspart  1-3 Units Subcutaneous TID AC     insulin aspart  1-3 Units Subcutaneous At Bedtime     polyethylene glycol  17 g Oral Daily     ranitidine  150 mg Oral At Bedtime     senna-docusate  1 tablet Oral BID    Or     senna-docusate  2 tablet Oral BID     simvastatin  10 mg Oral At Bedtime     sodium chloride (PF)  3 mL Intracatheter Q8H     sodium chloride (PF)  3 mL Intracatheter Q8H     umeclidinium  1 puff Inhalation QPM       Data     Recent Labs  Lab 10/24/18  0731 10/23/18  1640 10/23/18  0722 10/22/18  1634 10/22/18  0711  10/21/18  0657  10/19/18  2156   WBC  --   --   --   --  10.5  --  11.6*  --  9.7   HGB 10.2* 10.3*  --   --  8.9*  < > 7.8*  < > 9.3*   MCV  --   --   --   --  91  --  92  --  93     --   --   --  195  --  217  --  260   INR  --   --   --   --   --   --   --   --  1.54*    135 132* 133 134  --  135  --  135   POTASSIUM 5.0  --  5.0 5.5* 5.3  --  5.0  --  4.4   CHLORIDE 105  --  101 101 100  --  100  --  96   CO2 26  --  26 26 26  --  28  --  30   BUN 53*  --  57* 52* 52*  --  42*  --  43*   CR 2.39* 2.94* 3.03* 3.24* 3.04*  --  2.11*  --  1.80*   ANIONGAP  6  --  5 6 8  --  7  --  9   GERARD 8.4*  --  7.9* 8.3* 8.1*  --  8.3*  --  8.6   *  --  130* 167* 152*  --  125*  --  178*   ALBUMIN  --   --  2.4*  --   --   --  2.7*  --  3.1*   PROTTOTAL  --   --  5.7*  --   --   --  5.8*  --  6.8   BILITOTAL  --   --  0.8  --   --   --  0.7  --  0.7   ALKPHOS  --   --  105  --   --   --  79  --  98   ALT  --   --  229*  --   --   --  223*  --  255*   AST  --   --  110*  --   --   --  166*  --  300*   < > = values in this interval not displayed.    No results found for this or any previous visit (from the past 24 hour(s)).

## 2018-10-24 NOTE — PROGRESS NOTES
[facility] Progress Note  [facility]    Brief visit with pt, per her extended length of hospitalization.  She was concerned with contacting her niece, who is her main support person.  I assisted her making this phone call.  No SH needs identified at present, but SH team is available if needs arise.                                                                                                                                                 Crystal Mendoza M.A.  Staff   Pager [phone]  Phone [phone]

## 2018-10-24 NOTE — PLAN OF CARE
Problem: Patient Care Overview  Goal: Plan of Care/Patient Progress Review  Outcome: No Change  Pt has been more alert today.  Lab value's are getting better, see lab.  Poor appetite.  Switched IV from left arm to right, limb alert in place.  VVS, 2L NC.  IV infusing 125 ml/hr.  Up with lift.  Montano patent and intact.  Dressing CDI.  Pain managed with oxy.  Continue to monitor.

## 2018-10-24 NOTE — PROGRESS NOTES
MD Notification    Notified Person: MD    Notified Person Name: Dr. Lancaster    Notification Date/Time: 10/24/18 2347    Notification Interaction: Pager/phone    Purpose of Notification: Patient has not had bowel movement for 5 days, we have given suppository with no results and patient is quite uncomfortable. Requesting an enema     Orders Received: Order for tap water enema placed- given     Comments:

## 2018-10-24 NOTE — PROGRESS NOTES
Orthopedic Surgery  Helene Gibbs  10/24/2018  Admit Date:  10/19/2018  POD # 4  S/P Right hip hemiarthroplasty    Patient resting comfortably in bed.    Pain controlled.  Tolerating oral intake.    Denies nausea or vomiting  Denies chest pain or shortness of breath  No events overnight.     Alert and orient to person, place, and time.  Vital Sign Ranges  Temperature Temp  Av.5  F (36.9  C)  Min: 98  F (36.7  C)  Max: 98.9  F (37.2  C)   Blood pressure Systolic (24hrs), Av , Min:117 , Max:142        Diastolic (24hrs), Av, Min:62, Max:77      Pulse Pulse  Av.5  Min: 69  Max: 70   Respirations Resp  Av.2  Min: 16  Max: 18   Pulse oximetry SpO2  Av.9 %  Min: 92 %  Max: 100 %       Dressing is clean, dry, and intact.   Minimal erythema of the surrounding skin.   Bilateral calves are soft, non-tender.  Bilateral lower extremity is NVI.  Sensation intact bilateral lower extremities  5/5 motor with resisted dorsi and plantar flexion bilaterally  +Dp pulse    Labs:  Recent Labs   Lab Test  10/24/18   0731  10/23/18   0722  10/22/18   1634   POTASSIUM  5.0  5.0  5.5*     Recent Labs   Lab Test  10/24/18   0731  10/23/18   1640  10/22/18   0711   HGB  10.2*  10.3*  8.9*     Recent Labs   Lab Test  10/19/18   2156  18   0800  18   1101   INR  1.54*  1.00  0.97     Recent Labs   Lab Test  10/24/18   0731  10/22/18   0711  10/21/18   0657   PLT  184  195  217       A/P  1. Plan   Continue Heparin and ASA for DVT prophylaxis.     Mobilize with PT/OT    WBAT.     Continue current pain regiment.   Leave dressing intact    2. Disposition   Anticipate d/c to TCU based on medical clearance.    Azul Crow PA-C

## 2018-10-24 NOTE — PLAN OF CARE
Problem: Patient Care Overview  Goal: Plan of Care/Patient Progress Review  Outcome: No Change  VSS, baseline numbness BLE. Up with ceiling lift. Pain well controlled with oxycodone and tylenol. Dressing saturated with yellow drainage, dressing changed. Updated ortho.  Generalized edema.  Coccyx red, mepilex applied. Alert and orient x 4. Montano in for strict I&O.  Suppository given with medium result. .

## 2018-10-25 NOTE — PLAN OF CARE
Problem: Patient Care Overview  Goal: Plan of Care/Patient Progress Review  Outcome: No Change  A&Ox4, forgetful. Up with lift. Mod CHO diet, . VSS on 2L, denies SOB. Infrequent congested cough. C/o 6/10 back pain, given oxycodone prn. L hip dressing C/D/I. IVF @ 50 ml/hr. Incontinent of bowel and bladder. Cxray complete, started on Rocephin.

## 2018-10-25 NOTE — PROGRESS NOTES
EVAN  D: Met with pt to discuss discharge planning. Per MD pt wasn't aware of where she was going on discharge. EVAN reminded pt she was going to [facility] likely tomorrow. EVAN explained she's been keeping [facility] updated on how she's doing and that they are keeping a spot for her. [facility]. Discussed transportation to [facility]. Pt thinks it would be best to set-up a ride for her as she doesn't feel family can safely transport. EVAN will call and set-up ride with [facility] via w/c in the morning. Pt very concerned that [facility] didn't know where she was going. EVAN assisted pt with calling ra to update on plan. Pt left [facility] [place]. EVAN will follow-up with pt in the morning and make sure [facility] is update on plan prior to pt leaving.   P: Will continue to follow and support a safe discharge plan    Ирина Thomas MSW, LGSW

## 2018-10-25 NOTE — PROGRESS NOTES
Orthopedic Surgery  Helene Gibbs  10/25/2018  Admit Date:  10/19/2018  POD # 5  S/P right hip hemiarthroplasty     Patient resting comfortably in bed.    Pain controlled.  Tolerating oral intake.    Denies nausea or vomiting.  Denies chest pain or shortness of breath.  No events overnight.      Alert and orient to person, place, and time.  Vital Sign Ranges  Temperature Temp  Av.4  F (36.9  C)  Min: 97.8  F (36.6  C)  Max: 99  F (37.2  C)   Blood pressure Systolic (24hrs), Av , Min:114 , Max:142        Diastolic (24hrs), Av, Min:57, Max:77      Pulse Pulse  Av  Min: 69  Max: 69   Respirations Resp  Av  Min: 16  Max: 16   Pulse oximetry SpO2  Av.9 %  Min: 88 %  Max: 100 %       Dressing intact w/ moderate amount of drainage.   Minimal erythema of the surrounding skin.   Bilateral calves are soft, non-tender.  Bilateral lower extremity is NVI.  Sensation intact bilateral lower extremities  5/5 motor with resisted dorsi and plantar flexion bilaterally  +Dp pulse     Labs:  Recent Labs   Lab Test  10/25/18   0657  10/24/18   0731  10/23/18   0722   POTASSIUM  4.8  5.0  5.0     Recent Labs   Lab Test  10/25/18   0657  10/24/18   0731  10/23/18   1640   HGB  11.0*  10.2*  10.3*     Recent Labs   Lab Test  10/19/18   2156  18   0800  18   1101   INR  1.54*  1.00  0.97     Recent Labs   Lab Test  10/25/18   0657  10/24/18   0731  10/22/18   0711   PLT  172  184  195       A/P  1. Plan   Continue Heparin and ASA for DVT prophylaxis.     Mobilize with PT/OT and WBAT.     Continue current pain regiment.   Change dressing.     2. Disposition   Anticipate d/c to TCU pending medical clearance.     Milka Anne PA-C

## 2018-10-25 NOTE — PROGRESS NOTES
[facility]    Hospitalist Progress Note      Assessment & Plan   Helene Gibbs is a 83 year old female who was admitted on 10/19/2018.  Past history of CKD, CHF, COPD, DM II, HTN, A-fib s/p AVN ablation and PPM, PVD who presents with mechanical fall, left femoral neck fracture s/p right hemiarthroplasty.          Right femoral neck fracture s/p right hemiarthroplasty 10/20:   - management per the orthopedic service    Possible PNA  Admission CXR with questionable lower lobe infiltrate vs atelectasis.  No leukocytosis and afebrile but has productive with sputum now growing E.coli.  - repeat CXR  - start ceftriaxone to cover for possible PNA, would plan for short course  - follow up sputum sensitivities     Acute on chronic blood loss anemia  Hypovolemic hypotension: resolved  Patient with a history of melenic stool, none recently with hgb wnl as recently as 8/9/18.  Admission hgb 9.3 fell to 7.7 POD1 with associated hypotension s/p 1 unit PRBC on 10/21/18 and 10/22.  Has had EGD and colonoscopy in the past year.  Some concern that ongoing anemia related to GI blood loss in the setting of chronic anticoagulation.  - hgb stabilizing 10 g/dL  - continue PTA ranitidine     Oliguric acute kidney injury on CKD stage III  Baseline Cr 1.1, admission Cr 1.8 and trending up.  Likely combination of pre-renal status in setting of acute blood loss and hypotension.  Notably, required pressor support intra-operatively and intermittently hypotensive post-op.  No nephrotoxic meds (gentamicin on MAR was intra-op washing) or contrast exposure.  UA 10/22 with mild albuminuria, otherwise unremarkable.  - Cr trending down at 1.69 on 10/25  - decrease IVF to 50 ml/hr  - daily BMP    Liver transaminitis  AST and ALT elevated at admission.  Suspect hypoperfusion.  No abdominal pain.  - AST/ALT trending down     Peripheral vascular disease with left femoral artery stent  Coronary artery disease  Chronic diastolic  CHF  Atrial fibrillation s/p AVN ablation with PPM, permanent on anticoagulation   Last TTE in March 2018 with an ejection fraction of 50-55%, severe biatrial enlargement, 2+ tricuspid regurgitation with mild pulmonary hypertension.  Patient with a troponin elevation as well as abnormal stress imaging 5/7/18 (small area of apical ischemia of moderate intensity) medically managed.    - hold PTA lasix and lisinopril due to RICK  - mildly hypertensive, will resume metoprolol at reduced dose of 12.5 mg bid  - resumed aspirin 10/23      Dental abscess:   Patient with recent root canal of left lower bicuspid, right upper second molar fractured.  - discussed with Dr. Collins 10/25, reports #3 tooth needs to be extracted with plan to do this once medically cleared; no need to continue clindamycin (will need to take a dose prior to #3 tooth extraction in next 1-2 weeks)     Type 2 diabetes:  A1C 5.5% this admission.  Managed on glipizide alone.  - hold PTA glipizide  - continue med sliding scale insulin    Chronic back pain  - Lyrica stopped due to RICK      COPD  - continue Incruse-Ellipta (sub for Spiriva), albuterol neb qid    oropharyngeal thrush:  Complaining or oral pain 10/24 with oral plaques noted.  - start nystatin swish and spit 10/24    Constipation  - sennakot 2 tabs bid, miralax bid, daily dulcolax supp        DVT Prophylaxis: subcutaneous heparin  Code Status: DNR/DNI    Disposition: Expected discharge tomorrow pending further improvement in labs.    Ervin Lares    Interval History   Ongoing constipation.  Denies dyspnea but continues with productive cough, no fever/chills.  Pain is controlled.  No other complaints.    -Data reviewed today: I reviewed all new labs and imaging results over the last 24 hours. I personally reviewed no images or EKG's today.    Physical Exam   Temp: 97.9  F (36.6  C) Temp src: Oral BP: 147/73 Pulse: 69 Heart Rate: 69 Resp: 16 SpO2: 93 % O2 Device: Nasal cannula Oxygen Delivery: 2  LPM  Vitals:    10/21/18 0621 10/23/18 0625 10/25/18 0705   Weight: 74.4 kg (164 lb) 76.6 kg (168 lb 12.8 oz) 95.1 kg (209 lb 11.2 oz)     Vital Signs with Ranges  Temp:  [97.8  F (36.6  C)-99  F (37.2  C)] 97.9  F (36.6  C)  Pulse:  [69] 69  Heart Rate:  [69-71] 69  Resp:  [16] 16  BP: (114-147)/(57-77) 147/73  SpO2:  [88 %-100 %] 93 %  I/O last 3 completed shifts:  In: 4133 [P.O.:940; I.V.:3193]  Out: 1200 [Urine:1200]     Constitutional: Well developed, obese female in no acute distress, sitting up in bed  Respiratory: bibasilar crackles, no wheezing or tachypnea  Cardiovascular: regular rate and rhythm, normal S1/S2 with grade 2/6 systolic murmur  GI: abdomen soft, normal bowel sounds  Lymph:  1+ edema of bilateral lower extremities  Other:  alert and appropriate, cranial nerves grossly intact    Medications     sodium chloride 100 mL/hr at 10/25/18 1414       albuterol  2.5 mg Nebulization 4x daily     apixaban ANTICOAGULANT  2.5 mg Oral BID     aspirin  81 mg Oral Daily     bisacodyl  10 mg Rectal Daily     clindamycin  300 mg Oral TID     guaiFENesin  600 mg Oral BID     insulin aspart  1-3 Units Subcutaneous TID AC     insulin aspart  1-3 Units Subcutaneous At Bedtime     nystatin  500,000 Units Swish & Spit 4x Daily     polyethylene glycol  17 g Oral BID     ranitidine  150 mg Oral At Bedtime     senna-docusate  2 tablet Oral BID     simvastatin  10 mg Oral At Bedtime     sodium chloride (PF)  3 mL Intracatheter Q8H     sodium chloride (PF)  3 mL Intracatheter Q8H     umeclidinium  1 puff Inhalation QPM       Data     Recent Labs  Lab 10/25/18  0657 10/24/18  0731 10/23/18  1640 10/23/18  0722  10/22/18  0711  10/21/18  0657  10/19/18  2156   WBC 9.1  --   --   --   --  10.5  --  11.6*  --  9.7   HGB 11.0* 10.2* 10.3*  --   --  8.9*  < > 7.8*  < > 9.3*   MCV 91  --   --   --   --  91  --  92  --  93    184  --   --   --  195  --  217  --  260   INR  --   --   --   --   --   --   --   --   --  1.54*     137 135 132*  < > 134  --  135  --  135   POTASSIUM 4.8 5.0  --  5.0  < > 5.3  --  5.0  --  4.4   CHLORIDE 107 105  --  101  < > 100  --  100  --  96   CO2 25 26  --  26  < > 26  --  28  --  30   BUN 43* 53*  --  57*  < > 52*  --  42*  --  43*   CR 1.69* 2.39* 2.94* 3.03*  < > 3.04*  --  2.11*  --  1.80*   ANIONGAP 6 6  --  5  < > 8  --  7  --  9   GERARD 8.2* 8.4*  --  7.9*  < > 8.1*  --  8.3*  --  8.6   * 121*  --  130*  < > 152*  --  125*  --  178*   ALBUMIN 2.3*  --   --  2.4*  --   --   --  2.7*  --  3.1*   PROTTOTAL 6.0*  --   --  5.7*  --   --   --  5.8*  --  6.8   BILITOTAL 0.6  --   --  0.8  --   --   --  0.7  --  0.7   ALKPHOS 94  --   --  105  --   --   --  79  --  98   *  --   --  229*  --   --   --  223*  --  255*   AST 47*  --   --  110*  --   --   --  166*  --  300*   < > = values in this interval not displayed.    No results found for this or any previous visit (from the past 24 hour(s)).

## 2018-10-25 NOTE — PLAN OF CARE
Problem: Patient Care Overview  Goal: Plan of Care/Patient Progress Review  A&O. VSS on 2L O2 overnight. Tele 100% V-paced. Up with lift, Repositioned in bed overnight with A2. Pain managed with oxycodone and tylenol. CMS intact ex baseline numbness BLE. Dressing saturated with yellow drainage, dressing changed 2100. Generalized edema. mepilex to coccyx. Montano intact for strict I&O. IVF infusing.

## 2018-10-25 NOTE — PLAN OF CARE
Problem: Patient Care Overview  Goal: Plan of Care/Patient Progress Review  Discharge Planner PT   Patient plan for discharge: Disch to TCU prior to her Sr Living Apartment where she lives alone.  Current status: PT: Patient quite somnolent but willing to participate.  Needs modA and vc for CHARLIE exercises, and modA of 2 and vc for bed mobility, supine to sit, and for transfer/gait with FWW, WBAT L for stand-pivot transfer, 2' from EOB to recliner chair.at her R.  Barriers to return to prior living situation: Postop pain, edema, weakness, and level of assist needed.  Recommendations for discharge: Disch to TCU prior to returning to Sr apartment alone, per BPCI POC.  Rationale for recommendations: Will continue to need skilled PT for recovery of functional mobility, and safety for negotiation of chosen residence.       Entered by: Jacky Mendoza 10/24/2018 7:01 PM

## 2018-10-25 NOTE — PLAN OF CARE
Problem: Patient Care Overview  Goal: Plan of Care/Patient Progress Review  Outcome: Improving  Patient up with assist of 2 and walker.  Pain managed with Oxycodone.  VSS on 2L NC.  A/Ox4; forgetful.  Dressing changed, now CDI.  Baseline BLE numbness present.  Montano removed; due to void.  Fair PO intake.  Sliding scale insulin coverage given with lunch.  NS@50.  Tele 100%V-paced; order to discontinue from hospitalist.

## 2018-10-25 NOTE — TELEPHONE ENCOUNTER
Pt has since cancelled the 11/1/18 appt.  Called again and left vm.  If pt calls back, please hold her on phone for Triage nurse.  Brandi Hinojosa RN

## 2018-10-26 NOTE — DISCHARGE SUMMARY
[facility]    Discharge Summary  Hospitalist    Date of Admission:  10/19/2018  Date of Discharge:  10/26/2018  Discharging Provider: Ervin Lares  Date of Service (when I saw the patient): 10/26/18    Discharge Diagnoses   Right femoral neck fracture s/p hemiarthroplasty  Acute hypoxic respiratory failure  Bilateral pneumonia  Acute on chronic blood loss anemia  Hypovolemic hypotension  Oliguric RICK on CKD stage III  Liver transaminitis  PVD  CAD  Chronic diastolic CHF  A-fib s/p ablation and PPM  Recent dental abscess  DM II  Chronic back pain  COPD  Oropharyngeal and esophageal thrush  Constipation     History of Present Illness   Helene Gibbs is an 83 year old female who presented with fall with right femoral neck fracture s/p hemiarthroplasty.  Hospital course complicated by RICK due to hypovolemia/blood loss in addition to hypoxic respiratory failure due to pneumonia and probable component volume overload.    Hospital Course   Helene Gibbs was admitted on 10/19/2018.  The following problems were addressed during her hospitalization:    Right femoral neck fracture s/p right hemiarthroplasty 10/20:   Follow up with Orthopedic surgery as recommended.     Bilateral bacterial PNA, organism unspecified  Acute hypoxic respiratory failure  Admission CXR with questionable lower lobe infiltrate vs atelectasis.  No leukocytosis and afebrile but has productive with sputum growing E.coli and repeat CXR 10/25 with bilateral patchy infiltrates.  Also with mild oxygen requirements, though suspect degree of volume overload and will be resuming lasix at discharge.  Will discharge on course of ceftin.  Wean oxygen as tolerated.      Acute on chronic blood loss anemia  Hypovolemic hypotension: resolved  Patient with a history of melenic stool, none recently with hgb wnl as recently as 8/9/18.  (Has had EGD and colonoscopy in the past year.  Some concern that ongoing anemia related to GI blood loss in the  setting of chronic anticoagulation.)  Admission hgb 9.3 fell to 7.7 POD1 with associated hypotension s/p 1 unit PRBC on 10/21/18 and 10/22.  Hgb now stable about 10 g/dL.  Continue PTA ranitidine.      Oliguric acute kidney injury on CKD stage III  Baseline Cr 1.1, admission Cr 1.8 with peak of 3.24 on 10/22.  Likely combination of pre-renal status in setting of acute blood loss and hypotension.  Notably, required pressor support intra-operatively and intermittently hypotensive post-op.  UA 10/22 with mild albuminuria, otherwise unremarkable.  Cr returned to baseline with IVF.     Liver transaminitis  AST and ALT elevated at admission.  Suspect hypoperfusion.  No abdominal pain.  LFT's trending down.      Peripheral vascular disease with left femoral artery stent  Coronary artery disease  Chronic diastolic CHF  Atrial fibrillation s/p AVN ablation with PPM, permanent on anticoagulation   Last TTE in March 2018 with an ejection fraction of 50-55%, severe biatrial enlargement, 2+ tricuspid regurgitation with mild pulmonary hypertension.  Patient with a troponin elevation as well as abnormal stress imaging 5/7/18 (small area of apical ischemia of moderate intensity) medically managed.  Will continue on PTA metoprolol and resume lasix at discharge.  Will continue to hold lisinopril 10 mg daily given recent RICK, resume as indicated.  Continue PTA aspirin.  Will hold PTA apixaban 2.5 mg bid until after dental procedure next week.      Recent root canal:   Patient with recent root canal of left lower bicuspid 10/18 and completed 1 week course of clindamycin 10/25.  Requires extraction of tooth #3 which will be scheduled for next week (should take clindamycin prior to procedure per dentist).      Type 2 diabetes:  A1C 5.5% this admission.  Managed on glipizide alone which will be continued.     Chronic back pain  Resume PTA Lyrica (initially held due to RICK).      COPD  Continue Incruse-Ellipta (sub for Spiriva), albuterol  neb qid     Oropharyngeal and esophageal thrush:  Complaining of oral pain and odynophagia 10/24 with oral plaques noted.  Continue nystatin swish and swallow x7 days.     Constipation  Continue bid sennakot and milk of mag prn.  ADDENDUM:  Mag returned slightly elevated at 3.0 (due to receiving single dose MOM in setting of RICK).  Will hold MOM and order bid miralax at discharge.  Resume MOM once magnesium level normalizes as patient insistent this is only thing that works for her constipation.      Ervin Lares    Significant Results and Procedures   Right hip hemiarthroplasty.    Pending Results   These results will be followed up by: Hospitalist service  Unresulted Labs Ordered in the Past 30 Days of this Admission     Date and Time Order Name Status Description    10/26/2018 0659 Magnesium In process           Code Status   DNR / DNI       Primary Care Physician   Neisha Esparza    Constitutional: well developed, well nourished female in no acute distress  Respiratory: mild bibasilar crackles, no crackles or tachypnea  Cardiovascular: regular rate and rhythm, normal S1/S2, no murmur, rubs or gallops  GI: abdomen soft, non-tender, non-distended, normal bowel sounds  Lymph/Hematologic: 1+ peripheral edema of upper and lower extremities  Skin: No rash or bruising  Musculoskeletal: Extremities warm and well perfused  Neurologic: alert and appropriate, cranial nerves grossly intact  Psychiatric: normal affect    Discharge Disposition   Discharged to nursing home  Condition at discharge: Stable    Consultations This Hospital Stay   PHYSICAL THERAPY ADULT IP CONSULT  OCCUPATIONAL THERAPY ADULT IP CONSULT  ORTHOPEDIC SURGERY IP CONSULT  OCCUPATIONAL THERAPY ADULT IP CONSULT  PHYSICAL THERAPY ADULT IP CONSULT  CARE TRANSITION RN/SW IP CONSULT  NEPHROLOGY IP CONSULT  PHYSICAL THERAPY ADULT IP CONSULT  OCCUPATIONAL THERAPY ADULT IP CONSULT    Time Spent on this Encounter   IErvin, personally saw the patient  today and spent greater than 30 minutes discharging this patient.    Discharge Orders     General info for SNF   Length of Stay Estimate: Short Term Care: Estimated # of Days <30  Condition at Discharge: Improving  Level of care:skilled   Rehabilitation Potential: Good  Admission H&P remains valid and up-to-date: Yes  Recent Chemotherapy: N/A  Use Nursing Home Standing Orders: Yes     Mantoux instructions   Give two-step Mantoux (PPD) Per Facility Policy Yes     Reason for your hospital stay   Right bipolar hemiarthroplasty for femoral neck fracture     Wound care   Site:   Right hip  Instructions:  Leave dressing intact if Aquacel and remove at POD #7, remove staples POD #14  Daily dressing changes gauze and tape     Follow Up and recommended labs and tests   Follow up with Dr. Fish in 2 weeks.  No follow up labs or test are needed. Call [facility] for appointment [phone]     Activity - Up with assistive device     Weight bearing status   WBAT     Encourage PO fluids     Physical Therapy Adult Consult   Evaluate and treat as clinically indicated.    Reason:  Right bipolar hemiarthroplasty for femoral neck fracture     Occupational Therapy Adult Consult   Evaluate and treat as clinically indicated.    Reason:  Right bipolar hemiarthroplasty for femoral neck fracture     Fall precautions     Hip precautions     Advance Diet as Tolerated   Follow this diet upon discharge: Orders Placed This Encounter     Room Service     Moderate Consistent CHO Diet       Discharge Medications   Current Discharge Medication List      START taking these medications    Details   acetaminophen (TYLENOL) 325 MG tablet Take 2 tablets (650 mg) by mouth every 6 hours as needed for mild pain  Qty: 40 tablet, Refills: 0    Associated Diagnoses: Closed fracture of left hip, initial encounter (H)      cefuroxime (CEFTIN) 500 MG tablet Take 1 tablet (500 mg) by mouth every 12 hours for 7 doses  Refills: 0    Associated Diagnoses: Community  acquired bacterial pneumonia      enoxaparin (LOVENOX) 40 MG/0.4ML injection Inject 0.4 mLs (40 mg) Subcutaneous every 24 hours    Associated Diagnoses: Closed fracture of left hip, initial encounter (H)      nystatin (MYCOSTATIN) 765803 UNIT/ML suspension Swish and swallow 1 mL (100,000 Units) in mouth 4 times daily  Qty: 280 mL    Associated Diagnoses: Thrush      oxyCODONE IR (ROXICODONE) 5 MG tablet Take 1 tablet (5 mg) by mouth every 4 hours as needed for moderate to severe pain  Qty: 30 tablet, Refills: 0    Associated Diagnoses: Closed fracture of left hip, initial encounter (H)      senna-docusate (SENOKOT-S;PERICOLACE) 8.6-50 MG per tablet Take 2 tablets by mouth 2 times daily  Qty: 40 tablet, Refills: 0    Associated Diagnoses: Closed fracture of left hip, initial encounter (H)         CONTINUE these medications which have NOT CHANGED    Details   albuterol (PROAIR HFA, PROVENTIL HFA, VENTOLIN HFA) 108 (90 BASE) MCG/ACT inhaler Inhale 2 puffs into the lungs every 4 hours as needed for shortness of breath / dyspnea or wheezing  Qty: 1 Inhaler, Refills: 5    Associated Diagnoses: COPD (chronic obstructive pulmonary disease) (H)      aspirin EC 81 MG EC tablet Take 1 tablet (81 mg) by mouth daily    Associated Diagnoses: NSTEMI (non-ST elevated myocardial infarction) (H)      fluticasone (FLONASE) 50 MCG/ACT spray Spray 1-2 sprays into both nostrils daily as needed for rhinitis or allergies      FUROSEMIDE PO Take 20 mg by mouth daily      GlipiZIDE (GLUCOTROL PO) Take 5 mg by mouth every morning (before breakfast)      guaiFENesin (MUCINEX) 600 MG 12 hr tablet Take 1 tablet (600 mg) by mouth 2 times daily  Qty: 28 tablet, Refills: 0    Associated Diagnoses: Pneumonia due to infectious organism, unspecified laterality, unspecified part of lung      hydrOXYzine (VISTARIL) 25 MG capsule Take 1 capsule (25 mg) by mouth 2 times daily as needed for itching  Qty: 180 capsule, Refills: 1    Associated Diagnoses:  Itching      Lidocaine (LIDOCARE) 4 % Patch Place 3 patches onto the skin daily as needed for moderate pain      magnesium hydroxide (MILK OF MAGNESIA) 400 MG/5ML suspension Take 30 mLs by mouth At Bedtime       metoclopramide (REGLAN) 5 MG tablet Take 1 tablet (5 mg) by mouth 3 times daily as needed  Qty: 20 tablet, Refills: 0      metoprolol tartrate (LOPRESSOR) 25 MG tablet Take 1 tablet (25 mg) by mouth 2 times daily  Qty: 180 tablet, Refills: 3    Comments: Profile Rx: patient will contact pharmacy when needed  Associated Diagnoses: NSTEMI (non-ST elevated myocardial infarction) (H)      ondansetron (ZOFRAN ODT) 4 MG ODT tab Take 1 tablet (4 mg) by mouth every 6 hours as needed for nausea  Qty: 30 tablet, Refills: 1    Associated Diagnoses: Nausea      pregabalin (LYRICA) 75 MG capsule Take 1 capsule (75 mg) by mouth 2 times daily  Qty: 60 capsule, Refills: 3    Associated Diagnoses: Neuropathy      ranitidine (ZANTAC) 150 MG tablet TAKE ONE TABLET BY MOUTH TWO TIMES A DAY  Qty: 180 tablet, Refills: 3    Associated Diagnoses: Dyspepsia      simvastatin (ZOCOR) 10 MG tablet TAKE 1 TABLET (10 MG) BY MOUTH AT BEDTIME  Qty: 90 tablet, Refills: 2    Associated Diagnoses: Type 2 diabetes mellitus with diabetic nephropathy, without long-term current use of insulin (H)      tiotropium (SPIRIVA) 18 MCG capsule Inhale 1 capsule (18 mcg) into the lungs every evening  Qty: 30 capsule, Refills: 11    Associated Diagnoses: Chronic obstructive pulmonary disease, unspecified COPD type (H)      ACCU-CHEK SMARTVIEW test strip USE 1 STRIP BY IN VITRO ROUTE 2 TIMES DAILY OR AS DIRECTED  Qty: 200 strip, Refills: 1    Associated Diagnoses: Type 2 diabetes mellitus with diabetic nephropathy (H)         STOP taking these medications       apixaban ANTICOAGULANT (ELIQUIS) 2.5 MG tablet Comments:   Reason for Stopping:         diazepam 10 mg SUPP Comments:   Reason for Stopping:         HYDROcodone-acetaminophen (NORCO) 5-325 MG per  tablet Comments:   Reason for Stopping:         lisinopril (PRINIVIL/ZESTRIL) 10 MG tablet Comments:   Reason for Stopping:         traMADol (ULTRAM) 50 MG tablet Comments:   Reason for Stopping:             Allergies   Allergies   Allergen Reactions     Ambien [Zolpidem Tartrate] Visual Disturbance     Hallucinations and fell out of bed at night.     Penicillins Hives     Definity      Caused a syncopal episode.     Sulfa Drugs Itching     Cymbalta Rash     Fluconazole Rash     Data   Most Recent 3 CBC's:  Recent Labs   Lab Test  10/26/18   0659  10/25/18   0657  10/24/18   0731   10/22/18   0711   10/21/18   0657   WBC   --   9.1   --    --   10.5   --   11.6*   HGB  10.4*  11.0*  10.2*   < >  8.9*   < >  7.8*   MCV   --   91   --    --   91   --   92   PLT  203  172  184   --   195   --   217    < > = values in this interval not displayed.      Most Recent 3 BMP's:  Recent Labs   Lab Test  10/26/18   0659  10/25/18   0657  10/24/18   0731   NA  139  138  137   POTASSIUM  5.3  4.8  5.0   CHLORIDE  111*  107  105   CO2  24  25  26   BUN  32*  43*  53*   CR  1.16*  1.69*  2.39*   ANIONGAP  4  6  6   GERARD  8.5  8.2*  8.4*   GLC  99  115*  121*     Most Recent 2 LFT's:  Recent Labs   Lab Test  10/25/18   0657  10/23/18   0722   AST  47*  110*   ALT  140*  229*   ALKPHOS  94  105   BILITOTAL  0.6  0.8     Most Recent INR's and Anticoagulation Dosing History:  Anticoagulation Dose History     Recent Dosing and Labs Latest Ref Rng & Units 10/15/2015 12/3/2015 12/8/2015 8/9/2017 1/4/2018 1/5/2018 10/19/2018    INR 0.86 - 1.14 1.19(H) 1.21(H) 1.21(H) 1.41(H) 0.97 1.00 1.54(H)    INR 0.86 - 1.14 - - - - - - -    INR Point of Care 0.86 - 1.14 - - - - - - -        Most Recent 6 Bacteria Isolates From Any Culture (See EPIC Reports for Culture Details):  Recent Labs   Lab Test  10/23/18   0911  10/22/18   2300  10/19/18   2300  07/16/18   1740  04/16/18   1352  04/06/18   0820   CULT  Light growth  Escherichia coli  *  Light  growth  Strain 2  Escherichia coli  *  Plus  Light growth  Normal pal    No growth  <10,000 colonies/mL  Escherichia coli  *  No growth  >100,000 colonies/mL  Escherichia coli  *  10,000 to 50,000 colonies/mL  mixed urogenital pal       Most Recent TSH, T4 and A1c Labs:  Recent Labs   Lab Test  10/19/18   2156   04/25/17   1445   TSH   --    --   1.56   A1C  5.5   < >  6.4*    < > = values in this interval not displayed.     Results for orders placed or performed during the hospital encounter of 10/19/18   XR Pelvis w Hip Left 1 View    Narrative    PELVIS WITH LEFT HIP LATERAL TWO VIEWS  10/19/2018 10:31 PM     HISTORY:  Pain.     COMPARISON: 7/18/2018.      Impression    IMPRESSION:  There is a displaced and angulated acute appearing  fracture of the left femoral neck. No other areas suspicious for acute  fracture are identified. Diffuse osteopenia. Arterial calcification.  Left femoral artery stent. Moderate amount of gas and stool within  loops of colon are noted in the pelvis.    BARAK IZQUIERDO MD   Head CT w/o contrast    Narrative    CT SCAN OF THE HEAD WITHOUT CONTRAST   10/19/2018 10:12 PM     HISTORY: fall, hit head on eliquis;     TECHNIQUE:  Axial images of the head and coronal reformations without  IV contrast material. Radiation dose for this scan was reduced using  automated exposure control, adjustment of the mA and/or kV according  to patient size, or iterative reconstruction technique.    COMPARISON: None.    FINDINGS:  There is diffuse parenchymal volume loss.  White matter  changes are present in the cerebral hemispheres that are consistent  with small vessel ischemic disease in this age patient. There is no  evidence of intracranial hemorrhage, mass, acute infarct or anomaly.  The visualized portions of the sinuses and mastoids appear normal.  There is no evidence of trauma.      Impression    IMPRESSION: No intracranial hemorrhage or skull fractures are  identified.      JOHNATHAN LOWRY MD    XR Chest 1 View    Narrative    CHEST ONE VIEW  10/19/2018 10:33 PM     HISTORY:  Pain.     COMPARISON: 7/21/2018.      Impression    IMPRESSION: Dual-lead cardiac device left chest wall, with lead tips  unchanged in position. There is likely a small left pleural effusion,  as well as mild associated infiltrate and/or atelectasis at the left  lung base laterally. The lungs are otherwise clear. Heart size appears  stable. Pulmonary vascularity is within normal limits. Aortic  calcification.    BARAK IZQUIERDO MD   XR Pelvis w Hip Port Left 1 View    Narrative    PELVIS AND HIP PORTABLE LEFT ONE VIEW 10/20/2018 12:00 PM     HISTORY: Postoperative hip arthroplasty.       Impression    IMPRESSION: Left hip hemiarthroplasty without apparent complication.  Postoperative soft tissue gas is noted. Left femoral artery stent is  noted. Skin staples are in place.    GREGORY RAYMUNDO MD   XR Chest 2 Views    Narrative    CHEST TWO VIEWS 10/25/2018 6:09 PM     HISTORY:  Assess for infiltrate.      COMPARISON: October 19, 2018.     FINDINGS: Patchy bilateral infiltrates. Stable cardiac device. The  cardiac silhouette is prominent but stable. Pulmonary vasculature is  unremarkable.      Impression    IMPRESSION: Patchy bilateral infiltrates.      MABLE VIZCARRA MD     *Note: Due to a large number of results and/or encounters for the requested time period, some results have not been displayed. A complete set of results can be found in Results Review.

## 2018-10-26 NOTE — PROGRESS NOTES
EVAN  D: Received discharge orders for pt. Met with pt to discuss discharge planning. Pt would like SW to set-up transportation. Called [facility] and set-up transportation via [facility] stretcher. PCS form completed and faxed-stretcher is needed due to leg fracture, weakness, and inability to tolerate sitting in a wheelchair. Faxed orders, scripts, and PAS to [facility].     PAS-RR    D: Per DHS regulation, SW completed and submitted PAS-RR to [place] [employer] Direct Connect via the Senior [employer] Line.  PAS-RR confirmation # is : [id number]    I: SW spoke with pt and they are aware a PAS-RR has been submitted.  EVAN reviewed with pt that they may be contacted for a follow up appointment within 10 days of hospital discharge if their SNF stay is < 30 days.  Contact information for Senior [employer] Line was also provided.    A: Pt verbalized understanding.    P: Further questions may be directed to Senior [employer] Line at #[phone], option #4 for PAS-RR staff.    Ирина Thomas MSW, LGSW

## 2018-10-26 NOTE — TELEPHONE ENCOUNTER
Patient is currently in the hospital.  Will close encounter.  MARIOLA StephensN, RN  [employer]

## 2018-10-26 NOTE — PROGRESS NOTES
Orthopedic Surgery  Helene Gibbs  10/26/2018  Admit Date:  10/19/2018  POD # 6  S/P Right hip hemiarthroplasty     Patient resting comfortably in bed.    Pain controlled.  Tolerating oral intake.    Denies nausea or vomiting  Denies chest pain or shortness of breath  No events overnight.     Alert and orient to person, place, and time.  Vital Sign Ranges  Temperature Temp  Av.1  F (36.7  C)  Min: 97.7  F (36.5  C)  Max: 99.1  F (37.3  C)   Blood pressure Systolic (24hrs), Av , Min:134 , Max:158        Diastolic (24hrs), Av, Min:64, Max:75      Pulse Pulse  Av.5  Min: 69  Max: 72   Respirations Resp  Av  Min: 16  Max: 16   Pulse oximetry SpO2  Av %  Min: 92 %  Max: 100 %       Dressing is clean, dry, and intact.   Minimal erythema of the surrounding skin.   Bilateral calves are soft, non-tender.  Bilateral lower extremity is NVI.  Sensation intact bilateral lower extremities  Active dorsi and plantar flexion bilaterally  +Dp pulse    Labs:  Recent Labs   Lab Test  10/26/18   0659  10/25/18   0657  10/24/18   0731   POTASSIUM  5.3  4.8  5.0     Recent Labs   Lab Test  10/26/18   0659  10/25/18   0657  10/24/18   0731   HGB  10.4*  11.0*  10.2*     Recent Labs   Lab Test  10/19/18   2156  18   0800  18   1101   INR  1.54*  1.00  0.97     Recent Labs   Lab Test  10/26/18   0659  10/25/18   0657  10/24/18   0731   PLT  203  172  184       A/P  1. Plan   Continue Lovenox for DVT prophylaxis.  Aspirin 325 mg qd   Mobilize with PT/OT    WBAT.     Continue current pain regiment.   Leave dressing intact    2. Disposition   Anticipate d/c to TCU today    Azul Crow PA-C

## 2018-10-26 NOTE — PLAN OF CARE
Problem: Patient Care Overview  Goal: Plan of Care/Patient Progress Review  Outcome: Improving  Patient A&Ox4, but forgetful at times. VSS on 2L. CMS intact, except baseline BLE numbness and tingling. RUE volar bruising noted, tender. Dressing clean, dry, and intact. Hip & Back pain managed with PRN Oxycodone. Up with the assist of two and a lift. Incontinent cares provided. Infrequent productive cough, incentive spirometer encouraged. Blood glucose monitoring, blood sugars 132 and 120 overnight. Patient slept between cares. Will continue to monitor.

## 2018-10-27 NOTE — PLAN OF CARE
Problem: Patient Care Overview  Goal: Plan of Care/Patient Progress Review  Physical Therapy Discharge Summary    Reason for therapy discharge:    Discharged to transitional care facility.    Progress towards therapy goal(s). See goals on Care Plan in [facility] electronic health record for goal details.  Goals not met.  Barriers to achieving goals:   discharge from facility.    Therapy recommendation(s):    Continued therapy is recommended.  Rationale/Recommendations:  Pt would benefit from continued skilled PT services to progress safety and IND with functional mobility.

## 2018-10-27 NOTE — TELEPHONE ENCOUNTER
Patient new admit: clarified several medications:  Changes ASA to 81 mg PO daily per discharge orders  Stop Clindamycin  Change Nystatin to 5 ml dose  Sent script for Lyrica to pharmacy    Electronically signed by ALLEN Jeronimo GNP

## 2018-10-27 NOTE — PLAN OF CARE
Problem: Patient Care Overview  Goal: Plan of Care/Patient Progress Review  Discharge Planner PT   Patient plan for discharge: Disch to TCU prior to returning to home alone in Sr living apartment.  Current status: PT: Patient anxious and distracted regarding impending disch to TCU. Patient consents to supine exercises with Fara and vc, but declines to get OOB.  Barriers to return to prior living situation: Postop pain, edema, weakness, and current level of care needed.  Recommendations for discharge: TCU.  Rationale for recommendations: Will continue to need skilled PT for recovery of functional mobility and safety for negotiation of chosen residence.       Entered by: Jacky Mendoza 10/26/2018 7:57 PM

## 2018-10-29 NOTE — PROGRESS NOTES
Clinic Care Coordination Contact  Care Coordination Transition Communication         Clinical Data: Patient was hospitalized at [facility] from 10/19/18 to 10/26/18 with diagnosis of Right femoral neck francture w/p hemiarthroplasty and Acute hypoxic respiratory failure.     Transition to Facility:              Facility Name: [facility]              Contact name and phone number/fax: [phone], fax [phone]    Plan: RN/SW Care Coordinator will await notification from facility staff informing RN/SW Care Coordinator of patient's discharge plans/needs. RN/SW Care Coordinator will review chart and outreach to facility staff every 4 weeks and as needed.     Santa Pacheco [facility]  Clinic Care Coordinator - [facility], and [facility]  Ph: [phone]  [email or web address]  (Today's date: 10/29/18)

## 2018-10-29 NOTE — LETTER
[facility]   To:   [facility]          Please give to facility    From:   Santa Pacheco  [facility]  Care Coordinator   [facility]   P: [phone]  [email or web address]   Patient Name:  Helene Gibbs Date of Birth: [birth year]   Admit date: 10/26/18      *Information Needed:  Please contact me when the patient will discharge (or if they will move to long term care)- include the discharge date, disposition, and main diagnosis   - If the patient is discharged with home care services, please provide the name of the agency    Also- Please inform me if a care conference is being held.   Phone, Fax or Email with information                              Thank you

## 2018-10-29 NOTE — PROGRESS NOTES
"[facility]  PRIMARY CARE PROVIDER AND CLINIC:  Neisha Esparza R [address] / [place]                [place] [address]  Chief Complaint   Patient presents with     Hospital F/U     [place] Medical Record Number:  [id number]  Place of Service where encounter took place:  [facility] ([facility]) [[id number]]    HPI:    Helene Gibbs is a 83 year old  (1935), PMH of admitted to the above facility from  [facility].  Hospital stay 10/19/18 through 10/26/18.  Left femoral neck Fx s/p right hemiarthroplasty on 10/20:   Pneumonia: hypoxia, required O2, oral ceftin and wean O2  Anemia: hgb 9.3--> 7.7 symptomatic hypotension, recieved 2 units of PRBC on 10/21 and 10/22, Hgb stable at discharge.   RICK: chronic CKDIII, baseline creat 1.1, peaked  During hospitalization 3.24, IVF given and creat returned to baseline  Transaminitis: AST and ALT elevated on admission, suspect hypoperfusion, LFT's trending down during hospitalization.   DCHF/atrial fib, CAD: 3/18 TTE EF of 50-55%, severe biatrial enlargement, pulm HTN : held lisinopril due to RICK ,continue PTA ASA, holding apixaban due to dental procedure this week.   Recent root canal: 10/18 root canal of left lower bicuspid, completed 1 week of clindamycin 10/25, will have extraction of tooth #3 this week, dentist wants patient to take clindamycin prior to extraction      Admitted to this facility for  rehab, medical management and nursing care.  HPI information obtained from: facility chart records, facility staff, patient report and [facility] chart review.  Current issues are:   On exam today patient is resting in bed, she has O2 on but states \"I don't need Oxygen\" denies SOB, cough, congestion, fever, chills, N/V/D states she has not had a bowel movement in a few days, denies abdominal pain, states pain in left hip is well controlled with current pain medications, patient states she is sleeping fair at night, no other " concerns.    Last 3 BP's: 130/80, 136/79, 155/86 mmHg  Admission Weight: 196.6 lbs  Current Weight:195.9 lbs  HR Range:69-74 bpm  BGL:   0800: 96  1000: 86  1600: 105, 145     CODE STATUS/ADVANCE DIRECTIVES DISCUSSION:   DNR / DNI  Patient's living condition: lives alone    ALLERGIES:Ambien [zolpidem tartrate]; Penicillins; Definity; Sulfa drugs; Cymbalta; and Fluconazole  PAST MEDICAL HISTORY:  has a past medical history of Anemia; Ankle arthritis; Arthritis; Back pain; Bell's palsy (9/08); Breast CA (H) (2004-); CKD (chronic kidney disease); Colon polyps; Congestive heart failure (H); COPD (chronic obstructive pulmonary disease) (H); Coronary artery disease; DM (diabetes mellitus) (H); GERD (gastroesophageal reflux disease); Hyperlipidemia; Hypertension; Lumbar spinal stenosis; Nicotine dependence; Obesity; Osteoporosis; Other chronic pain; Pacemaker; Permanent atrial fibrillation (H) (8/2008); Pleural effusion; Pulmonary hypertension (H); PVD (peripheral vascular disease) (H); Rectal stenosis; Tobacco abuse; and Tricuspid regurgitation. She also has no past medical history of Difficult intubation; Malignant hyperthermia; or PONV (postoperative nausea and vomiting).  PAST SURGICAL HISTORY:  has a past surgical history that includes arthroscopy shoulder rt/lt (1999, 2004); joint replacemtn, knee rt/lt; COLONOSCOPY THRU STOMA, DIAGNOSTIC (? 2005); MAMMOGRAM, SCREENING (1/2009); DEXA, BONE DENSITY, AXIAL SKEL; Lumpectomy breast; Implant pacemaker (10/2012); EP Ablation AV node (10/2012); Phacoemulsification clear cornea with standard intraocular lens implant (Left, 7/16/2015); Laparoscopic cholecystectomy with cholangiograms (N/A, 12/14/2015); Colonoscopy (N/A, 10/7/2016); Phacoemulsification clear cornea with toric intraocular lens implant (Right, 5/9/2017); Esophagoscopy, gastroscopy, duodenoscopy (EGD), combined (N/A, 8/10/2017); Bone marrow biopsy, bone specimen, needle/trocar (N/A, 8/29/2017); and Arthroplasty  hip (Left, 10/20/2018).  FAMILY HISTORY: family history includes C.A.D. in her father; Diabetes in her mother; Hypertension in her mother. There is no history of Breast Cancer or Colon Cancer.  SOCIAL HISTORY:  reports that she has quit smoking. Her smoking use included Cigarettes. She has a 11.25 pack-year smoking history. She has never used smokeless tobacco. She reports that she does not drink alcohol or use illicit drugs.    Post Discharge Medication Reconciliation Status: discharge medications reconciled, continue medications without change.  Current Outpatient Prescriptions   Medication Sig Dispense Refill     ACCU-CHEK SMARTVIEW test strip USE 1 STRIP BY IN VITRO ROUTE 2 TIMES DAILY OR AS DIRECTED 200 strip 1     acetaminophen (TYLENOL) 325 MG tablet Take 2 tablets (650 mg) by mouth every 6 hours as needed for mild pain 40 tablet 0     albuterol (PROAIR HFA, PROVENTIL HFA, VENTOLIN HFA) 108 (90 BASE) MCG/ACT inhaler Inhale 2 puffs into the lungs every 4 hours as needed for shortness of breath / dyspnea or wheezing 1 Inhaler 5     aspirin 325 MG EC tablet Take 1 tablet (325 mg) by mouth daily 30 tablet 0     cefuroxime (CEFTIN) 500 MG tablet Take 1 tablet (500 mg) by mouth every 12 hours for 7 doses  0     enoxaparin (LOVENOX) 40 MG/0.4ML injection Inject 0.4 mLs (40 mg) Subcutaneous every 24 hours       fluticasone (FLONASE) 50 MCG/ACT spray Spray 1-2 sprays into both nostrils daily as needed for rhinitis or allergies       FUROSEMIDE PO Take 20 mg by mouth daily       GlipiZIDE (GLUCOTROL PO) Take 5 mg by mouth every morning (before breakfast)       guaiFENesin (MUCINEX) 600 MG 12 hr tablet Take 1 tablet (600 mg) by mouth 2 times daily 28 tablet 0     hydrOXYzine (VISTARIL) 25 MG capsule Take 1 capsule (25 mg) by mouth 2 times daily as needed for itching 180 capsule 1     Lidocaine (LIDOCARE) 4 % Patch Place 3 patches onto the skin daily as needed for moderate pain       metoclopramide (REGLAN) 5 MG tablet  "Take 1 tablet (5 mg) by mouth 3 times daily as needed 20 tablet 0     metoprolol tartrate (LOPRESSOR) 25 MG tablet Take 1 tablet (25 mg) by mouth 2 times daily 180 tablet 3     nystatin (MYCOSTATIN) 238167 UNIT/ML suspension Swish and swallow 1 mL (100,000 Units) in mouth 4 times daily 280 mL      ondansetron (ZOFRAN ODT) 4 MG ODT tab Take 1 tablet (4 mg) by mouth every 6 hours as needed for nausea 30 tablet 1     oxyCODONE IR (ROXICODONE) 5 MG tablet Take 1 tablet (5 mg) by mouth every 4 hours as needed for moderate to severe pain 30 tablet 0     polyethylene glycol (MIRALAX/GLYCOLAX) Packet Take 17 g by mouth 2 times daily 7 packet      pregabalin (LYRICA) 75 MG capsule Take 1 capsule (75 mg) by mouth 2 times daily 60 capsule 3     ranitidine (ZANTAC) 150 MG tablet TAKE ONE TABLET BY MOUTH TWO TIMES A  tablet 3     senna-docusate (SENOKOT-S;PERICOLACE) 8.6-50 MG per tablet Take 2 tablets by mouth 2 times daily 40 tablet 0     simvastatin (ZOCOR) 10 MG tablet TAKE 1 TABLET (10 MG) BY MOUTH AT BEDTIME 90 tablet 2     tiotropium (SPIRIVA) 18 MCG capsule Inhale 1 capsule (18 mcg) into the lungs every evening 30 capsule 11       ROS:  10 point ROS of systems including Constitutional, Eyes, Respiratory, Cardiovascular, Gastroenterology, Genitourinary, Integumentary, Musculoskeletal, Psychiatric were all negative except for pertinent positives noted in my HPI.    Exam:  /80  Pulse 75  Temp 97.9  F (36.6  C)  Resp 18  Ht 5' 5\" (1.651 m)  Wt 195 lb 14.4 oz (88.9 kg)  LMP  (LMP Unknown)  SpO2 96%  BMI 32.6 kg/m2  GENERAL APPEARANCE:  Alert, in no distress  ENT:  Mouth and posterior oropharynx normal, moist mucous membranes, [place]  EYES:  EOM, conjunctivae, lids, pupils and irises normal, PERRL  RESP:  respiratory effort and palpation of chest normal, no respiratory distress, diminished breath sounds bases bilaterally, crackles left base  CV:  Palpation and auscultation of heart done , regular rate and " rhythm, no murmur, rub, or gallop, peripheral edema 2+ in LE bilaterally  ABDOMEN:  normal bowel sounds, soft, nontender, no hepatosplenomegaly or other masses  M/S:   Examination of:   right upper extremity, left upper extremity and right lower extremity  Inspection, ROM, stability and muscle strength normal and decreased ROM and strength LLE  SKIN:  Inspection of skin and subcutaneous tissue baseline, did not visualize left hip incision  NEURO:   Cranial nerves 2-12 are normal tested and grossly at patient's baseline, speech WNL  PSYCH:  affect and mood normal    Lab/Diagnostic data:     CBC RESULTS:   Recent Labs   Lab Test  10/26/18   0659  10/25/18   0657   10/22/18   0711   WBC   --   9.1   --   10.5   RBC   --   3.82   --   3.08*   HGB  10.4*  11.0*   < >  8.9*   HCT   --   34.7*   --   28.1*   MCV   --   91   --   91   MCH   --   28.8   --   28.9   MCHC   --   31.7   --   31.7   RDW   --   15.8*   --   15.6*   PLT  203  172   < >  195    < > = values in this interval not displayed.       Last Basic Metabolic Panel:  Recent Labs   Lab Test  10/26/18   0659  10/25/18   0657   NA  139  138   POTASSIUM  5.3  4.8   CHLORIDE  111*  107   GERARD  8.5  8.2*   CO2  24  25   BUN  32*  43*   CR  1.16*  1.69*   GLC  99  115*       Liver Function Studies -   Recent Labs   Lab Test  10/25/18   0657  10/23/18   0722   PROTTOTAL  6.0*  5.7*   ALBUMIN  2.3*  2.4*   BILITOTAL  0.6  0.8   ALKPHOS  94  105   AST  47*  110*   ALT  140*  229*       TSH   Date Value Ref Range Status   04/25/2017 1.56 0.40 - 4.00 mU/L Final   03/11/2016 3.03 0.40 - 4.00 mU/L Final     Lab Results   Component Value Date    A1C 5.5 10/19/2018    A1C 6.9 07/21/2018       ASSESSMENT/PLAN:  Traumatic Closed fracture of neck of left femur with routine healing  Physical deconditioning  Acute s/p left hemiarthroplasty Dr. Fish 10/19/18  PT and OT for strengthening  Schedule tylenol 650mg TID, continue oxycodone 5mg q 4 hours prn, vistaril 25mg BID prn,        Pneumonia:   COPD:   Acute: Monitor SaO2 at rest and with activity, wean off O2, keep SaO2 > 90%,  ceftin 500mg BID for 3 more days, albuterol MDI 2 puffs q 4 hours prn, mucinex 600mg BID,  spiriva 18mcg inhaled daily, IS QID and prn    Anemia due to blood loss, acute  Acute: Hgb weekly while in TCU,     Hypertension goal BP (blood pressure) < 140/90  (HFpEF) heart failure with preserved ejection fraction (H)  CKD (chronic kidney disease) stage 3, GFR 30-59 ml/min (H)  Atrial fibrillation, unspecified type (H)  Acute/ongoing: daily weights, vitals daily and prn, BMP twice weekly, continue Metoprolol 25mg BID, lasix 20mg QD, On ASA 325mg QD (holding eliquis until after dental procedure)  DC lisinopril during hospitalization due to RICK    Transaminitis  CMP  Twice weekly while on TCU       Orders:  CMP twice weekly  Hgb weekly  Daily weights  Dulcolax 5mg BID prn  Wean off O2 to keep SaO2 > 90%  IS QID and prn  Schedule tylenol 650mg TID        Electronically signed by:  Tonya Lynn Haase, APRN CNP

## 2018-11-05 NOTE — LETTER
11/5/2018        RE: Helene Gibbs  [address]  [facility] [address]          [facility] DISCHARGE SUMMARY    PATIENT'S NAME: Helene Gibbs  YOB: 1935  MEDICAL RECORD NUMBER:  [id number]  Place of Service where encounter took place:  [facility] ([facility] [[id number]]    PRIMARY CARE PROVIDER AND CLINIC RESPONSIBLE AFTER TRANSFER: NovaNeisha shetty R [address] / [place]                [place] [id number]     TRANSFERRING PROVIDERS: ALLEN Jeronimo CNP, Dr. Mario MD  DATE OF SNF ADMISSION:  October / 26 / 2018  DATE OF SNF (anticipated) DISCHARGE: November / 06 / 2018  DISCHARGE DISPOSITION: [facility] Provider   RECENT HOSPITALIZATION/ED:  [facility] Hospital stay 10/19/18 to 10/26/18.     CODE STATUS/ADVANCE DIRECTIVES DISCUSSION:   DNR / DNI     Allergies   Allergen Reactions     Ambien [Zolpidem Tartrate] Visual Disturbance     Hallucinations and fell out of bed at night.     Penicillins Hives     Definity      Caused a syncopal episode.     Sulfa Drugs Itching     Cymbalta Rash     Fluconazole Rash     Condition on Discharge:  Improving.  Function:  Up with assist  Cognitive Scores: not available    Equipment: no aids    DISCHARGE DIAGNOSIS:   1. Closed fracture of neck of left femur with routine healing    2. Physical deconditioning    3. Other pneumonia, unspecified organism    4. Chronic obstructive pulmonary disease, unspecified COPD type (H)    5. Anemia due to blood loss, acute    6. Hypertension goal BP (blood pressure) < 140/90    7. (HFpEF) heart failure with preserved ejection fraction (H)    8. CKD (chronic kidney disease) stage 3, GFR 30-59 ml/min (H)    9. Atrial fibrillation, unspecified type (H)    10. Transaminitis        HPI Nursing Facility Course:  HPI information obtained from: facility chart records, facility staff, patient report and [facility] chart review.    83 year old female with left femoral neck Fx s/p  right hemiarthroplasty on 10/20 by Dr. Fish 10/19/18. She has scheduled tylenol 650mg TID, continue oxycodone 5mg q 4 hours prn, vistaril 25mg BID prnShe developed pneumonia with hypoxia, required O2, oral ceftin and weaned O2. Noted to have anemia: hgb 9.3--> 7.7 symptomatic with hypotension, received 2 units of PRBC on 10/21 and 10/22, Hgb stable at discharge to TCU. She also developed RICK on chronic CKDIII, baseline creat 1.1, peaked during hospitalization to 3.24, IVF given and creat returned to baseline. She also developed transaminitis with AST and ALT elevated on admission, suspected due to hypoperfusion, LFT's trending down during hospitalization. Patient with known dCHF/atrial fib, CAD. On  3/18 TTE EF of 50-55%, severe biatrial enlargement, pulm HTN. Hospital held lisinopril due to RICK ,continue PTA ASA, holding apixaban due to dental procedure this week. Patient had recent root canal: 10/18 root canal of left lower bicuspid, completed 1 week of clindamycin 10/25, will have extraction of tooth #3 this week, dentist wants patient to take clindamycin prior to extraction and stay off apixaban until sees dentist.       Left femur fx s/p repair  Physical deconditioning  Ready to discontinue home on 11/6 with [facility] home care for PT, OT, RN services. She is to f/u with ortho as recommended. Continue PRN pain medications as noted above in addition to scheduled Lyrica    Pneumonia  COPD  Weaned off O2 and completed ceftin.  Continues other respiratory meds as prior to admission. Room air sats 94% and no cough. Crackles left lung base - encouraging IS use.     Anemia due to blood loss, acute  Lab Results   Component Value Date    HGB 11.2 11/01/2018    HGB 10.4 10/26/2018       Hypertension goal BP (blood pressure) < 140/90  (HFpEF) heart failure with preserved ejection fraction (H)  CKD (chronic kidney disease) stage 3, GFR 30-59 ml/min (H)  Atrial fibrillation, unspecified type (H)  Patient continue  Metoprolol 25mg BID, lasix 20mg QD, On ASA 325mg QD (holding eliquis until after dental procedure). DC'ed lisinopril during hospitalization due to RICK. During TCU stay -157, wt down 9 lbs. Patient quite edematous despite weight loss - agreeable to TEDs knee high daily to both legs.     Transaminitis  Improving.   Lab Results   Component Value Date    AST 17 11/05/2018     Lab Results   Component Value Date    ALT 21 11/05/2018       Admission Weight: 10/26/18: 196.6 lbs and Current Weight: 11/5/18: 184.6 lbs.  BP: 118-157/69-85 mmHg  BG  AM: 104-218 mg/dL  PM: 118-195 mg/dL    PAST MEDICAL HISTORY:  has a past medical history of Anemia; Ankle arthritis; Arthritis; Back pain; Bell's palsy (9/08); Breast CA (H) (2004-); CKD (chronic kidney disease); Colon polyps; Congestive heart failure (H); COPD (chronic obstructive pulmonary disease) (H); Coronary artery disease; DM (diabetes mellitus) (H); GERD (gastroesophageal reflux disease); Hyperlipidemia; Hypertension; Lumbar spinal stenosis; Nicotine dependence; Obesity; Osteoporosis; Other chronic pain; Pacemaker; Permanent atrial fibrillation (H) (8/2008); Pleural effusion; Pulmonary hypertension (H); PVD (peripheral vascular disease) (H); Rectal stenosis; Tobacco abuse; and Tricuspid regurgitation. She also has no past medical history of Difficult intubation; Malignant hyperthermia; or PONV (postoperative nausea and vomiting).    DISCHARGE MEDICATIONS:  Current Outpatient Prescriptions   Medication Sig Dispense Refill     ACCU-CHEK SMARTVIEW test strip USE 1 STRIP BY IN VITRO ROUTE 2 TIMES DAILY OR AS DIRECTED 200 strip 1     Acetaminophen (TYLENOL PO) Take 650 mg by mouth 3 times daily       albuterol (PROAIR HFA, PROVENTIL HFA, VENTOLIN HFA) 108 (90 BASE) MCG/ACT inhaler Inhale 2 puffs into the lungs every 4 hours as needed for shortness of breath / dyspnea or wheezing 1 Inhaler 5     ASPIRIN PO Take 81 mg by mouth daily       bisacodyl (DULCOLAX) 5 MG EC tablet  Take 10 mg by mouth every evening       CLINDAMYCIN HCL PO Give 300 mg by mouth as needed for Dental Infection Prophylaxis Give 1 tablet (300mg) 4 times daily as needed. To be given the day prior to dental procedure       enoxaparin (LOVENOX) 40 MG/0.4ML injection Inject 0.4 mLs (40 mg) Subcutaneous every 24 hours       fluticasone (FLONASE) 50 MCG/ACT spray Spray 1-2 sprays into both nostrils daily as needed for rhinitis or allergies       FUROSEMIDE PO Take 40 mg by mouth daily        GlipiZIDE (GLUCOTROL PO) Take 5 mg by mouth every morning (before breakfast)       guaiFENesin (MUCINEX) 600 MG 12 hr tablet Take 1 tablet (600 mg) by mouth 2 times daily 28 tablet 0     hydrOXYzine (VISTARIL) 25 MG capsule Take 1 capsule (25 mg) by mouth 2 times daily as needed for itching 180 capsule 1     Lidocaine (LIDOCARE) 4 % Patch Place 3 patches onto the skin daily as needed for moderate pain       metoclopramide (REGLAN) 5 MG tablet Take 1 tablet (5 mg) by mouth 3 times daily as needed 20 tablet 0     metoprolol tartrate (LOPRESSOR) 25 MG tablet Take 1 tablet (25 mg) by mouth 2 times daily 180 tablet 3     nystatin (MYCOSTATIN) 151458 UNIT/ML suspension Swish and swallow 1 mL (100,000 Units) in mouth 4 times daily 280 mL      ondansetron (ZOFRAN ODT) 4 MG ODT tab Take 1 tablet (4 mg) by mouth every 6 hours as needed for nausea 30 tablet 1     oxyCODONE IR (ROXICODONE) 5 MG tablet Take 1 tablet (5 mg) by mouth every 4 hours as needed for moderate to severe pain 30 tablet 0     polyethylene glycol (MIRALAX/GLYCOLAX) Packet Take 17 g by mouth 2 times daily 7 packet      potassium chloride (KLOR-CON) 20 MEQ Packet Take 20 mEq by mouth daily       pregabalin (LYRICA) 75 MG capsule Take 1 capsule (75 mg) by mouth 2 times daily 60 capsule 3     ranitidine (ZANTAC) 150 MG tablet TAKE ONE TABLET BY MOUTH TWO TIMES A  tablet 3     senna-docusate (SENOKOT-S;PERICOLACE) 8.6-50 MG per tablet Take 2 tablets by mouth 2 times daily  40 tablet 0     simvastatin (ZOCOR) 10 MG tablet TAKE 1 TABLET (10 MG) BY MOUTH AT BEDTIME 90 tablet 2     tiotropium (SPIRIVA) 18 MCG capsule Inhale 1 capsule (18 mcg) into the lungs every evening 30 capsule 11       MEDICATION CHANGES/RATIONALE:   1. Stop nystatin swish and swallow - no symptoms  2. Changed lasix to 40 mg PO daily  3. Added KCL 20 meq PO daily  4. Added PRN Dulcolax tabs for constipation  5. Scheduled tylenol TID    Controlled medications sent with patient:   Script for Lyrica and Oxycodone medication for 20 tabs and 0 refills given to patient at dischage to have them fill at their out patient pharmacy     ROS:    10 point ROS of systems including Constitutional, Eyes, Respiratory, Cardiovascular, Gastroenterology, Genitourinary, Integumentary, Musculoskeletal, Psychiatric were all negative except for pertinent positives noted in my HPI.    Physical Exam:   Vitals: /76  Pulse 71  Temp 97.7  F (36.5  C)  Resp 18  Wt 184 lb 9.6 oz (83.7 kg)  LMP  (LMP Unknown)  SpO2 95%  BMI 30.72 kg/m2  BMI= Body mass index is 30.72 kg/(m^2).  GENERAL APPEARANCE:  Alert, in no distress, pleasant, cooperative, oriented x 4  EYES:  EOM, lids, pupils and irises normal, sclera clear and conjunctiva normal, no discharge or mattering on lids or lashes noted  ENT:  Mouth normal, moist mucous membranes, nose normal without drainage or crusting, external ears without lesions, hearing acuity intact  NECK: supple, symmetrical  RESP:  respiratory effort and palpation of chest normal, no chest wall tenderness, no respiratory distress, Lung sounds crackles left lower lung, patient is on room air  CV:  Palpation and auscultation of heart done, rate and rhythm controlled and regular today, no murmur, no rub or gallop. Edema +2 pitting bilateral lower extremities.   ABDOMEN:  normal bowel sounds, soft, nontender.  M/S:   Gait and station walks with assist , no tenderness or swelling of the joints; able to move all  extremities   SKIN:  Inspection and palpation of skin and subcutaneous tissue: skin on extremities warm, dry and intact without rashes  NEURO: cranial nerves 2-12 grossly intact, no facial asymmetry, no speech deficits and able to follow directions, moves all extremities symmetrically  PSYCH:  insight and judgement intact, memory intact, affect and mood normal     DISCHARGE PLAN:  Occupational Therapy, Physical Therapy, Registered Nurse and From:  [facility]  Patient instructed to follow-up with:  PCP in 7 days      [facility] scheduled appointments:  Future Appointments  Date Time Provider Department Center   11/9/2018 1:45 PM STONE TECH [facility]   11/19/2018 1:30 PM Hector Moran MD [facility] referral needed and placed by this provider: No    Pending labs: none    SNF labs   CBC RESULTS:   Recent Labs   Lab Test 11/01/18  10/26/18   0659  10/25/18   0657   10/22/18   0711   WBC   --    --   9.1   --   10.5   RBC   --    --   3.82   --   3.08*   HGB  11.2*  10.4*  11.0*   < >  8.9*   HCT   --    --   34.7*   --   28.1*   MCV   --    --   91   --   91   MCH   --    --   28.8   --   28.9   MCHC   --    --   31.7   --   31.7   RDW   --    --   15.8*   --   15.6*   PLT   --   203  172   < >  195    < > = values in this interval not displayed.       Last Basic Metabolic Panel:  Recent Labs   Lab Test 11/01/18  10/26/18   0659   NA  138  139   POTASSIUM  5.1  5.3   CHLORIDE  103  111*   GERARD  10.1  8.5   CO2  28  24   BUN  16  32*   CR  1.00  1.16*   GLC  147*  99       Liver Function Studies -   Recent Labs   Lab Test 11/01/18  10/25/18   0657   PROTTOTAL  6.3*  6.0*   ALBUMIN  2.7*  2.3*   BILITOTAL  0.6  0.6   ALKPHOS  78  94   AST  20  47*   ALT  33  140*       Lab Results   Component Value Date    A1C 5.5 10/19/2018    A1C 6.9 07/21/2018       Discharge Treatments: none    TOTAL DISCHARGE TIME:   Greater than 30 minutes  Electronically signed by:  ALLEN Jeronimo CNP          Documentation of Face-to-Face and Certification for Home Health Services     Patient: Helene Gibbs   YOB: 1935  MR Number: [id number]  Today's Date: 11/5/2018    I certify that patient: Helene Gibbs is under my care and that I, or a nurse practitioner or physician's assistant working with me, had a face-to-face encounter that meets the physician face-to-face encounter requirements with this patient on: 11/5/2018.    This encounter with the patient was in whole, or in part, for the following medical condition, which is the primary reason for home health care: weakness with recent femur fracture.    I certify that, based on my findings, the following services are medically necessary home health services: Nursing, Occupational Therapy and Physical Therapy.    My clinical findings support the need for the above services because: Nurse is needed: To assess status and med manage after changes in medications or other medical regimen.., Occupational Therapy Services are needed to assess and treat cognitive ability and address ADL safety due to impairment in self care ability. and Physical Therapy Services are needed to assess and treat the following functional impairments: weakness.    Further, I certify that my clinical findings support that this patient is homebound (i.e. absences from home require considerable and taxing effort and are for medical reasons or [facility] services or infrequently or of short duration when for other reasons) because: Requires assistance of another person or specialized equipment to access medical services because patient: Has prohibitive pain during ambulation. and Requires supervision of another for safe transfer...    Based on the above findings. I certify that this patient is confined to the home and needs intermittent skilled nursing care, physical therapy and/or speech therapy.  The patient is under my care, and I have initiated the establishment of the  plan of care.  This patient will be followed by a physician who will periodically review the plan of care.  Physician/Provider to provide follow up care: Neisha Esparza    Responsible Medicare certified PECOS Physician: Dr Marino Martin  Physician Signature: See electronic signature associated with these discharge orders.  Date: 11/5/2018        Sincerely,        ALLEN Jeronimo CNP

## 2018-11-05 NOTE — PROGRESS NOTES
[facility] DISCHARGE SUMMARY    PATIENT'S NAME: Helene Gibbs  YOB: 1935  MEDICAL RECORD NUMBER:  [id number]  Place of Service where encounter took place:  [facility] (S) [[id number]]    PRIMARY CARE PROVIDER AND CLINIC RESPONSIBLE AFTER TRANSFER: Neisha Esparza MARIMAR [address] [place]                [place] [address]     TRANSFERRING PROVIDERS: ALLEN Jeronimo CNP, Dr. Mario MD  DATE OF SNF ADMISSION:  October / 26 / 2018  DATE OF SNF (anticipated) DISCHARGE: November / 06 / 2018  DISCHARGE DISPOSITION: [facility] Provider   RECENT HOSPITALIZATION/ED:  [facility] stay 10/19/18 to 10/26/18.     CODE STATUS/ADVANCE DIRECTIVES DISCUSSION:   DNR / DNI     Allergies   Allergen Reactions     Ambien [Zolpidem Tartrate] Visual Disturbance     Hallucinations and fell out of bed at night.     Penicillins Hives     Definity      Caused a syncopal episode.     Sulfa Drugs Itching     Cymbalta Rash     Fluconazole Rash     Condition on Discharge:  Improving.  Function:  Up with assist  Cognitive Scores: not available    Equipment: no aids    DISCHARGE DIAGNOSIS:   1. Closed fracture of neck of left femur with routine healing    2. Physical deconditioning    3. Other pneumonia, unspecified organism    4. Chronic obstructive pulmonary disease, unspecified COPD type (H)    5. Anemia due to blood loss, acute    6. Hypertension goal BP (blood pressure) < 140/90    7. (HFpEF) heart failure with preserved ejection fraction (H)    8. CKD (chronic kidney disease) stage 3, GFR 30-59 ml/min (H)    9. Atrial fibrillation, unspecified type (H)    10. Transaminitis        HPI Nursing Facility Course:  HPI information obtained from: facility chart records, facility staff, patient report and [facility] chart review.    83 year old female with left femoral neck Fx s/p right hemiarthroplasty on 10/20 by Dr. Fish 10/19/18. She has scheduled tylenol 650mg TID,  continue oxycodone 5mg q 4 hours prn, vistaril 25mg BID prnShe developed pneumonia with hypoxia, required O2, oral ceftin and weaned O2. Noted to have anemia: hgb 9.3--> 7.7 symptomatic with hypotension, received 2 units of PRBC on 10/21 and 10/22, Hgb stable at discharge to TCU. She also developed RICK on chronic CKDIII, baseline creat 1.1, peaked during hospitalization to 3.24, IVF given and creat returned to baseline. She also developed transaminitis with AST and ALT elevated on admission, suspected due to hypoperfusion, LFT's trending down during hospitalization. Patient with known dCHF/atrial fib, CAD. On  3/18 TTE EF of 50-55%, severe biatrial enlargement, pulm HTN. Hospital held lisinopril due to RICK ,continue PTA ASA, holding apixaban due to dental procedure this week. Patient had recent root canal: 10/18 root canal of left lower bicuspid, completed 1 week of clindamycin 10/25, will have extraction of tooth #3 this week, dentist wants patient to take clindamycin prior to extraction and stay off apixaban until sees dentist.       Left femur fx s/p repair  Physical deconditioning  Ready to discontinue home on 11/6 with [facility] home care for PT, OT, RN services. She is to f/u with ortho as recommended. Continue PRN pain medications as noted above in addition to scheduled Lyrica    Pneumonia  COPD  Weaned off O2 and completed ceftin.  Continues other respiratory meds as prior to admission. Room air sats 94% and no cough. Crackles left lung base - encouraging IS use.     Anemia due to blood loss, acute  Lab Results   Component Value Date    HGB 11.2 11/01/2018    HGB 10.4 10/26/2018       Hypertension goal BP (blood pressure) < 140/90  (HFpEF) heart failure with preserved ejection fraction (H)  CKD (chronic kidney disease) stage 3, GFR 30-59 ml/min (H)  Atrial fibrillation, unspecified type (H)  Patient continue Metoprolol 25mg BID, lasix 20mg QD, On ASA 325mg QD (holding eliquis until after dental  procedure). DC'ed lisinopril during hospitalization due to RICK. During TCU stay -157, wt down 9 lbs. Patient quite edematous despite weight loss - agreeable to TEDs knee high daily to both legs.     Transaminitis  Improving.   Lab Results   Component Value Date    AST 17 11/05/2018     Lab Results   Component Value Date    ALT 21 11/05/2018       Admission Weight: 10/26/18: 196.6 lbs and Current Weight: 11/5/18: 184.6 lbs.  BP: 118-157/69-85 mmHg  BG  AM: 104-218 mg/dL  PM: 118-195 mg/dL    PAST MEDICAL HISTORY:  has a past medical history of Anemia; Ankle arthritis; Arthritis; Back pain; Bell's palsy (9/08); Breast CA (H) (2004-); CKD (chronic kidney disease); Colon polyps; Congestive heart failure (H); COPD (chronic obstructive pulmonary disease) (H); Coronary artery disease; DM (diabetes mellitus) (H); GERD (gastroesophageal reflux disease); Hyperlipidemia; Hypertension; Lumbar spinal stenosis; Nicotine dependence; Obesity; Osteoporosis; Other chronic pain; Pacemaker; Permanent atrial fibrillation (H) (8/2008); Pleural effusion; Pulmonary hypertension (H); PVD (peripheral vascular disease) (H); Rectal stenosis; Tobacco abuse; and Tricuspid regurgitation. She also has no past medical history of Difficult intubation; Malignant hyperthermia; or PONV (postoperative nausea and vomiting).    DISCHARGE MEDICATIONS:  Current Outpatient Prescriptions   Medication Sig Dispense Refill     ACCU-CHEK SMARTVIEW test strip USE 1 STRIP BY IN VITRO ROUTE 2 TIMES DAILY OR AS DIRECTED 200 strip 1     Acetaminophen (TYLENOL PO) Take 650 mg by mouth 3 times daily       albuterol (PROAIR HFA, PROVENTIL HFA, VENTOLIN HFA) 108 (90 BASE) MCG/ACT inhaler Inhale 2 puffs into the lungs every 4 hours as needed for shortness of breath / dyspnea or wheezing 1 Inhaler 5     ASPIRIN PO Take 81 mg by mouth daily       bisacodyl (DULCOLAX) 5 MG EC tablet Take 10 mg by mouth every evening       CLINDAMYCIN HCL PO Give 300 mg by mouth as  needed for Dental Infection Prophylaxis Give 1 tablet (300mg) 4 times daily as needed. To be given the day prior to dental procedure       enoxaparin (LOVENOX) 40 MG/0.4ML injection Inject 0.4 mLs (40 mg) Subcutaneous every 24 hours       fluticasone (FLONASE) 50 MCG/ACT spray Spray 1-2 sprays into both nostrils daily as needed for rhinitis or allergies       FUROSEMIDE PO Take 40 mg by mouth daily        GlipiZIDE (GLUCOTROL PO) Take 5 mg by mouth every morning (before breakfast)       guaiFENesin (MUCINEX) 600 MG 12 hr tablet Take 1 tablet (600 mg) by mouth 2 times daily 28 tablet 0     hydrOXYzine (VISTARIL) 25 MG capsule Take 1 capsule (25 mg) by mouth 2 times daily as needed for itching 180 capsule 1     Lidocaine (LIDOCARE) 4 % Patch Place 3 patches onto the skin daily as needed for moderate pain       metoclopramide (REGLAN) 5 MG tablet Take 1 tablet (5 mg) by mouth 3 times daily as needed 20 tablet 0     metoprolol tartrate (LOPRESSOR) 25 MG tablet Take 1 tablet (25 mg) by mouth 2 times daily 180 tablet 3     nystatin (MYCOSTATIN) 809092 UNIT/ML suspension Swish and swallow 1 mL (100,000 Units) in mouth 4 times daily 280 mL      ondansetron (ZOFRAN ODT) 4 MG ODT tab Take 1 tablet (4 mg) by mouth every 6 hours as needed for nausea 30 tablet 1     oxyCODONE IR (ROXICODONE) 5 MG tablet Take 1 tablet (5 mg) by mouth every 4 hours as needed for moderate to severe pain 30 tablet 0     polyethylene glycol (MIRALAX/GLYCOLAX) Packet Take 17 g by mouth 2 times daily 7 packet      potassium chloride (KLOR-CON) 20 MEQ Packet Take 20 mEq by mouth daily       pregabalin (LYRICA) 75 MG capsule Take 1 capsule (75 mg) by mouth 2 times daily 60 capsule 3     ranitidine (ZANTAC) 150 MG tablet TAKE ONE TABLET BY MOUTH TWO TIMES A  tablet 3     senna-docusate (SENOKOT-S;PERICOLACE) 8.6-50 MG per tablet Take 2 tablets by mouth 2 times daily 40 tablet 0     simvastatin (ZOCOR) 10 MG tablet TAKE 1 TABLET (10 MG) BY MOUTH AT  BEDTIME 90 tablet 2     tiotropium (SPIRIVA) 18 MCG capsule Inhale 1 capsule (18 mcg) into the lungs every evening 30 capsule 11       MEDICATION CHANGES/RATIONALE:   1. Stop nystatin swish and swallow - no symptoms  2. Changed lasix to 40 mg PO daily  3. Added KCL 20 meq PO daily  4. Added PRN Dulcolax tabs for constipation  5. Scheduled tylenol TID    Controlled medications sent with patient:   Script for Lyrica and Oxycodone medication for 20 tabs and 0 refills given to patient at dischage to have them fill at their out patient pharmacy     ROS:    10 point ROS of systems including Constitutional, Eyes, Respiratory, Cardiovascular, Gastroenterology, Genitourinary, Integumentary, Musculoskeletal, Psychiatric were all negative except for pertinent positives noted in my HPI.    Physical Exam:   Vitals: /76  Pulse 71  Temp 97.7  F (36.5  C)  Resp 18  Wt 184 lb 9.6 oz (83.7 kg)  LMP  (LMP Unknown)  SpO2 95%  BMI 30.72 kg/m2  BMI= Body mass index is 30.72 kg/(m^2).  GENERAL APPEARANCE:  Alert, in no distress, pleasant, cooperative, oriented x 4  EYES:  EOM, lids, pupils and irises normal, sclera clear and conjunctiva normal, no discharge or mattering on lids or lashes noted  ENT:  Mouth normal, moist mucous membranes, nose normal without drainage or crusting, external ears without lesions, hearing acuity intact  NECK: supple, symmetrical  RESP:  respiratory effort and palpation of chest normal, no chest wall tenderness, no respiratory distress, Lung sounds crackles left lower lung, patient is on room air  CV:  Palpation and auscultation of heart done, rate and rhythm controlled and regular today, no murmur, no rub or gallop. Edema +2 pitting bilateral lower extremities.   ABDOMEN:  normal bowel sounds, soft, nontender.  M/S:   Gait and station walks with assist , no tenderness or swelling of the joints; able to move all extremities   SKIN:  Inspection and palpation of skin and subcutaneous tissue: skin on  extremities warm, dry and intact without rashes  NEURO: cranial nerves 2-12 grossly intact, no facial asymmetry, no speech deficits and able to follow directions, moves all extremities symmetrically  PSYCH:  insight and judgement intact, memory intact, affect and mood normal     DISCHARGE PLAN:  Occupational Therapy, Physical Therapy, Registered Nurse and From:  [facility]  Patient instructed to follow-up with:  PCP in 7 days      [facility] scheduled appointments:  Future Appointments  Date Time Provider Department Center   11/9/2018 1:45 PM [facility] TECH [facility] PSA CLIN   11/19/2018 1:30 PM Hector Moran MD [facility]        MT referral needed and placed by this provider: No    Pending labs: none    SNF labs   CBC RESULTS:   Recent Labs   Lab Test 11/01/18  10/26/18   0659  10/25/18   0657   10/22/18   0711   WBC   --    --   9.1   --   10.5   RBC   --    --   3.82   --   3.08*   HGB  11.2*  10.4*  11.0*   < >  8.9*   HCT   --    --   34.7*   --   28.1*   MCV   --    --   91   --   91   MCH   --    --   28.8   --   28.9   MCHC   --    --   31.7   --   31.7   RDW   --    --   15.8*   --   15.6*   PLT   --   203  172   < >  195    < > = values in this interval not displayed.       Last Basic Metabolic Panel:  Recent Labs   Lab Test 11/01/18  10/26/18   0659   NA  138  139   POTASSIUM  5.1  5.3   CHLORIDE  103  111*   GERARD  10.1  8.5   CO2  28  24   BUN  16  32*   CR  1.00  1.16*   GLC  147*  99       Liver Function Studies -   Recent Labs   Lab Test 11/01/18  10/25/18   0657   PROTTOTAL  6.3*  6.0*   ALBUMIN  2.7*  2.3*   BILITOTAL  0.6  0.6   ALKPHOS  78  94   AST  20  47*   ALT  33  140*       Lab Results   Component Value Date    A1C 5.5 10/19/2018    A1C 6.9 07/21/2018       Discharge Treatments: none    TOTAL DISCHARGE TIME:   Greater than 30 minutes  Electronically signed by:  ALLEN Jeronimo CNP         Documentation of Face-to-Face and Certification for Home Health Services     Patient:  Helene Gibbs   YOB: 1935  MR Number: [id number]  Today's Date: 11/5/2018    I certify that patient: Helene Gibbs is under my care and that I, or a nurse practitioner or physician's assistant working with me, had a face-to-face encounter that meets the physician face-to-face encounter requirements with this patient on: 11/5/2018.    This encounter with the patient was in whole, or in part, for the following medical condition, which is the primary reason for home health care: weakness with recent femur fracture.    I certify that, based on my findings, the following services are medically necessary home health services: Nursing, Occupational Therapy and Physical Therapy.    My clinical findings support the need for the above services because: Nurse is needed: To assess status and med manage after changes in medications or other medical regimen.., Occupational Therapy Services are needed to assess and treat cognitive ability and address ADL safety due to impairment in self care ability. and Physical Therapy Services are needed to assess and treat the following functional impairments: weakness.    Further, I certify that my clinical findings support that this patient is homebound (i.e. absences from home require considerable and taxing effort and are for medical reasons or Denominational services or infrequently or of short duration when for other reasons) because: Requires assistance of another person or specialized equipment to access medical services because patient: Has prohibitive pain during ambulation. and Requires supervision of another for safe transfer...    Based on the above findings. I certify that this patient is confined to the home and needs intermittent skilled nursing care, physical therapy and/or speech therapy.  The patient is under my care, and I have initiated the establishment of the plan of care.  This patient will be followed by a physician who will periodically review the plan  of care.  Physician/Provider to provide follow up care: Neisha Esparza    Responsible Medicare certified PECOS Physician: Dr Marino Martin  Physician Signature: See electronic signature associated with these discharge orders.  Date: 11/5/2018

## 2018-11-06 NOTE — LETTER
Health Care Home - Access Care Plan    About Me  Patient Name:  Helene Gibbs    YOB: 1935  Age:                             83 year old   [facility] MRN:            [id number] Telephone Information:     Home Phone [phone]   Mobile none       Address:    [facility] [address]  [place] [facility] [address] Email address:  No e-mail address on record      Emergency Contact(s)  Name Relationship Lgl Grd Work Phone Home Phone Mobile Phone   1. PARESH TABOR* Relative No  [phone]   2. GO CUI Friend No  [phone]    3. TIERNEY CATALAN  Relative No   [phone]             Health Maintenance: Routine Health maintenance Reviewed: Due/Overdue     My Access Plan  Medical Emergency 911   Questions or concerns during clinic hours Primary Clinic Line, I will call the clinic directly: [facility] [phone]   24 Hour Appointment Line [phone] or  [phone]) (toll free)   24 Hour Nurse Line [phone] (toll free)   Questions or concerns outside clinic hours 24 Hour Appointment Line, I will call the after-hours on-call line:   [facility] [phone] or [phone] ([phone]) (toll-free)   Preferred Urgent Care     Preferred Hospital     Preferred Pharmacy [address]     Behavioral Health Crisis Line The National Suicide Prevention Lifeline at 1-165.414.2385 or 915     My Care Team Members  Patient Care Team       Relationship Specialty Notifications Start End    Neisha Esparza MD PCP - General Internal Medicine  4/17/14     Phone: [phone] Pager: [phone] Fax: [phone] [address] [place] [address]    Shawanda Catherine APRN CNP Nurse Practitioner Nurse Practitioner  4/17/14     Phone: [phone] Fax: [phone] [address] [place] [address]    Hospice, [facility]     2/3/16     Fax:  [phone]          [address] [place] [address]    Santa Pacheco, [facility] Care Coordinator   10/29/18            My Medical and Care Information  Problem List   Patient Active Problem List   Diagnosis     Type 2 diabetes mellitus with diabetic nephropathy (H)     Anemia     CKD (chronic kidney disease) stage 3, GFR 30-59 ml/min (H)     Chronic low back pain     Pleural effusion, left     Adjustment reaction with anxiety and depression     c diff colitis 11-12-12     Pulmonary hypertension (H)     Melanosis of colon     Diplopia     COPD (chronic obstructive pulmonary disease) (H)     Elevated TSH     CHF (congestive heart failure) (H)     Cardiac pacemaker in situ     Neck pain     Intractable back pain     Health Care Home     Mild cognitive impairment SLUMS = 22/ 30  CPT = 5.0/ 5.5 7-2014     Hypertension goal BP (blood pressure) < 140/90     Depression with anxiety     Tobacco abuse: [age]y/o on 8-12-14 @ 1ppd=50 pk yr hx      Stasis dermatitis     Thoracic disc herniation     Obesity     Hyperlipidemia     Nausea and vomiting     Cholelithiasis with acute on chronic cholecystitis     Slow transit constipation     Gastroesophageal reflux disease without esophagitis     Permanent atrial fibrillation (H)     Basal cell carcinoma of skin     Chronic pain syndrome     Tubular adenoma     Gastrointestinal hemorrhage, unspecified gastrointestinal hemorrhage type     Iron deficiency     Adverse effect of iron     History of bone marrow biopsy     Herpes zoster without complication     Controlled substance agreement signed     Other chronic pain     Hematoma of groin     Groin hematoma, initial encounter     Pulmonary nodule     Chest pressure     NSTEMI (non-ST elevated myocardial infarction) (H)     (HFpEF) heart failure with preserved ejection fraction (H)     Generalized weakness     Leg fracture, left      Current Medications and Allergies:  See printed Medication Report

## 2018-11-06 NOTE — LETTER
[facility] CARE COORDINATION  [address]  [facility] [address]  November 7, 2018    Helene Gibbs  [address]   [place] [facility] [address]      Dear Helene,    I am a clinic care coordinator who works with Neisha Esparza MD at [facility]. I wanted to thank you for spending the time to talk with me.  I also wanted to provide you with my contact information so that you can call me with questions or concerns about your health care. Below is a description of clinic care coordination and how I can further assist you.     The clinic care coordinator is a registered nurse and/or  who understand the health care system. The goal of clinic care coordination is to help you manage your health and improve access to the [facility] system in the most efficient manner. The registered nurse can assist you in meeting your health care goals by providing education, coordinating services, and strengthening the communication among your providers. The  can assist you with financial, behavioral, psychosocial, chemical dependency, counseling, and/or psychiatric resources.    Please feel free to contact me at [phone], with any questions or concerns. We at [facility] are focused on providing you with the highest-quality healthcare experience possible and that all starts with you.     Sincerely,     Santa Pacheco    Enclosed: I have enclosed a copy of a 24 Hour Access Plan. This has helpful phone numbers for you to call when needed. Please keep this in an easy to access place to use as needed.

## 2018-11-08 NOTE — PROGRESS NOTES
SUBJECTIVE:   Helene Gibbs is a 83 year old female who presents to clinic today for the following health issues:    Patient is accompanied by her niece.   She is in wheelchair    ED/UC Followup:    Facility:   ED  Date of visit: 11/13/2015  Reason for visit: Draining postoperative wound, initial encounter  Current Status: Discharged to home     Patient reports that she's still full of fluid    Hospital Follow-up Visit:    Hospital/Nursing Home/IP Rehab Facility: [facility]   Date of Admission: 10/26/2018  Date of Discharge: 11/06/2018  Reason(s) for Admission:   1. Closed fracture of neck of left femur with routine healing    2. Physical deconditioning              Problems taking medications regularly:  None       Medication changes since discharge: updated in medication list       Problems adhering to non-medication therapy:  None    Summary of hospitalization:  [facility] discharge summary reviewed  [facility] discharge reviewed  Diagnostic Tests/Treatments reviewed.  Follow up needed: PT, OT, PCP  Other Healthcare Providers Involved in Patient s Care:         Physical Therapy and Occupational Therapy  Update since discharge: improved.     Post Discharge Medication Reconciliation: discharge medications reconciled and changed, per note/orders (see AVS).  Plan of care communicated with patient and family     Coding guidelines for this visit:  Type of Medical   Decision Making Face-to-Face Visit       within 7 Days of discharge Face-to-Face Visit        within 14 days of discharge   Moderate Complexity [id number]   High Complexity [id number]          She discontinued lisinopril during TCU stay  Kidney function was low   But she kept taking lisinopril  She Is complaining of worsening of edema which is very uncomfortable    Problem list and histories reviewed & adjusted, as indicated.  Additional history: as documented    Medications and labs reviewed in EPIC    Reviewed and updated as  "needed this visit by clinical staff  Tobacco  Allergies  Meds       Reviewed and updated as needed this visit by Provider       ROS:  Constitutional, HEENT, cardiovascular, pulmonary, GI, , musculoskeletal, neuro, skin, endocrine and psych systems are negative, except as otherwise noted.    This document serves as a record of the services and decisions personally performed and made by Neisha Esparza MD. It was created on her behalf by Cassandra Holcomb, a trained medical scribe. The creation of this document is based on the provider's statements to the medical scribe.  Cassandra Holcomb 11:16 AM November 15, 2018    OBJECTIVE:     BP 93/51 (BP Location: Right arm, Patient Position: Sitting, Cuff Size: Adult Regular)  Pulse 74  Temp 97.6  F (36.4  C) (Tympanic)  Ht 1.651 m (5' 5\")  LMP  (LMP Unknown)  SpO2 95%  Breastfeeding? No  BMI 30.79 kg/m2  Body mass index is 30.79 kg/(m^2).  GENERAL: ambulated in a wheelchair, alert and no distress  SKIN: scar on left buttock, healing well, some coagulated blood due to Eliquis   Edema + + + +  She hs some fluid leaked from thigh as edema is worse  PSYCH: mentation appears normal, affect normal/bright    Diagnostic Test Results:  No results found. However, due to the size of the patient record, not all encounters were searched. Please check Results Review for a complete set of results.    ASSESSMENT/PLAN:     Helene was seen today for tcu follow up.    Diagnoses and all orders for this visit:    Type 2 diabetes mellitus with diabetic nephropathy, without long-term current use of insulin (H)  Compliant with medication  Doing well  Lab Results   Component Value Date    A1C 5.5 10/19/2018    A1C 6.9 07/21/2018    A1C 6.8 03/22/2018    A1C 6.6 01/04/2018    A1C 6.7 08/10/2017     CKD (chronic kidney disease) stage 3, GFR 30-59 ml/min (H)  -     Basic metabolic panel  Lisinopril was discontinued at TCU due to elevated kidney function tests  Advised patient to continue " holding lisinopril until kidney function improves  Re-check today  I will let her know if she should be back on lisinopril     (HFpEF) heart failure with preserved ejection fraction (H)  -     torsemide (DEMADEX) 20 MG tablet; Take 1 tablet (20 mg) by mouth daily  See below   furosemide is not working well  So switched to torsemide    Edema of lower extremity  Patient was admitted to TCU at [facility] on 10/26/18 for fracture of her left femur  She stayed there until 11/06/18  She is still wheelchair bound  Patient has persistent LL edema  Advised her to discontinue furosemide as not working well  Prescribed torsemide instead   Advised holding diuretic if she is dehydrated or sick    Patient has a scar of left arthroplasty  Healing well  Dressing was changed in office visit today     Patient feels very disapponited as so many things are wrong with her  She had to go to ED multiple times  Chronic pelvic issues and constipation  On the top of that she had this fracture.    She is getting help for house work  Has one niece.      Patient Instructions   Changed dressing on scar today  Stop furosemide  Take torsemide instead  Hod diuretics if you are very dehydrated  Hold lisinopril for the time being until kidney function improves  Hold metoprolol if your systolic blood pressure below 100  Follow up in one month  Seek sooner medical attention if there is any worsening of symptoms or problems    The information in this document, created by the medical scribe for me, accurately reflects the services I personally performed and the decisions made by me. I have reviewed and approved this document for accuracy prior to leaving the patient care area.  November 15, 2018 11:48 AM    Neisha Esparza MD  [facility]

## 2018-11-08 NOTE — TELEPHONE ENCOUNTER
"Last Written Prescription Date:  4/27/18  Last Fill Quantity: 200 strip,  # refills: 1   Last office visit: 8/9/2018 with prescribing provider:  Vilma   Future Office Visit:   Next 5 appointments (look out 90 days)     Nov 15, 2018 11:00 AM CST   [facility] Visit with Neisha [facility]za [facility]    [address] [facility] [address]   [phone]                 Requested Prescriptions   Pending Prescriptions Disp Refills     ACCU-CHEK SMARTVIEW test strip [Pharmacy Med Name: ACCU-CHEK SMARTVIEW TEST STRIP] 200 strip 1     Sig: USE 1 STRIP BY IN VITRO ROUTE 2 TIMES DAILY OR AS DIRECTED    Diabetic Supplies Protocol Passed    11/8/2018  1:45 AM       Passed - Patient is 18 years of age or older       Passed - Recent (6 mo) or future (30 days) visit within the authorizing provider's specialty    Patient had office visit in the last 6 months or has a visit in the next 30 days with authorizing provider.  See \"Patient Info\" tab in inbasket, or \"Choose Columns\" in Meds & Orders section of the refill encounter.              "

## 2018-11-08 NOTE — TELEPHONE ENCOUNTER
Prescription approved per [facility] Refill Protocol.  Shawanda REID RN

## 2018-11-09 NOTE — TELEPHONE ENCOUNTER
Spoke with patient to update. She will follow-up as soon as she feels she is able.     Glory GIBBS RN

## 2018-11-09 NOTE — TELEPHONE ENCOUNTER
Reason for Call:  Other Diabetic testing supplies  She has been using the wrong strips for the machine she has and she has been getting inaccuarate readings for a while now  She has been using the same Lancets over and over again  Needs new Order for Accu check Mariza Blood Glucometer and the   Accu Check Soft Clicks for the Lancets     The patient does not need any Test strips at this time     Detailed comments: Wanted us to aware of what was going on with this patient and they sent an electronic request also     Phone Number Patient can be reached at: Pharmacy [phone]    Best Time: anytime    Can we leave a detailed message on this number? NO    Call taken on 11/9/2018 at 11:44 AM by Betzaida Fisher

## 2018-11-09 NOTE — TELEPHONE ENCOUNTER
"Huddled with PCP and called patient:     Pt is aware she is getting a new BG monitor.     Her niece plans to  RX for her and will hopefully assist her with using for the first time.     Offered a Diabetic Educator Referral to help: She states  \"Right now, I cannot do that, I have OT, PT, I have to get my life together\" \"Dr. Esparza will tell you that I do a good job with that\"     In regards to the recommendation for Carotid US and Echo- Discussed in detail rationale for recommendation from Dr. Grimaldo (from [facility])     Pt is very resistant because of transportation purposes, etc.     She would agree to the orders being placed and attempting to schedule the appointment- she states \"I wasn't planning to schedule ANY appointments until I work on this therapy. Right now, I need help getting dressed, everything\"     She plans to speak with her home care nurse today and discuss when the HHA will come, it is possible she can have assistance getting ready for the appt (will not call until next week to schedule).     Thank you,   Glory GIBBS RN    "

## 2018-11-09 NOTE — TELEPHONE ENCOUNTER
To PCP:     Would pt benefit from referral to Diabetes Education?     Please advise,     Thank you,   Glory GIBBS RN

## 2018-11-09 NOTE — TELEPHONE ENCOUNTER
Let the patient know that carotid ultrasound and Echocardiogram is ordered   She can schedule that her convenience.  Orders are good for one year    The phone is   Please call [facility] to schedule your carotid ultrasound   [phone]  Please call [phone] to schedule Echocardiogram .  Done at [address]    Dr.Nasima Vilma MD

## 2018-11-12 NOTE — TELEPHONE ENCOUNTER
83 yr old patient needs  A new meter to test blood niece is at pharmacy now and no meter rx there.Jina from [facility] pharm calling to follow up. Asap           [id number] - [place], [place] - [address]        Kirti TIMMONS

## 2018-11-12 NOTE — TELEPHONE ENCOUNTER
"Spoke to pharmacist, Jina, who needed new order for glucometer ASAP. Accucheck ELI meter     FYI to :  No action needed if agree with RN order:  I placed and signed the order, as this \"addended\" encounter could not be routed to you with the pended glucometer order.  I hope this is okay. Route back right away ONLY if not okay.   Otherwise, close encounter.    Thank you,  Keila Alcocer RN          "

## 2018-11-13 NOTE — ED NOTES
"Attempted to call [facility] because the patient started in-home nursing care yesterday.  All calls to nursing go directly to voicemail.  The patient reports that she \"still lives alone and can take care of speaking with the nurses when she gets home.\"  The patient is A&Ox4 and is informed and involved in her own cares.  "

## 2018-11-13 NOTE — ED NOTES
Bed: 07  Expected date: 11/13/18  Expected time: 3:38 AM  Means of arrival:   Comments:  [address]  83F  Bleeding from surgical site on head

## 2018-11-13 NOTE — ED PROVIDER NOTES
History     Chief Complaint:    Drainage from Incision       HPI   Helene Gibbs is an anticoagulated 83 year old female with a complex medical history significant for breast cancer, CKD, chronic pain syndrome, CAD, COPD, diabetes, hypertension, hyperlipidemia, NSTEMI, permanent atrial fibrillation and a pacemaker who presents to the ED via EMS for evaluation of bleeding from a recent left hip edison-arthroplasty wound. The patient states that she started bleeding around 2200 last night and both she and the assisted living staff were unable to control the bleeding so she presented to the ED for further evaluation. She does not some mild left leg pain but otherwise denies fever. Patient is still oozing blood on arrival.     Allergies:  Ambien  Penicillins  Definity  Sulfa drugs  Cymbalta  Fluconazole     Medications:    Albuterol  Clindamycin  Eliquis  Lovenox  Flonase  Lasix  Glipizide  Vistaril  Reglan  Lopressor  Zofran  Oxycodone  Lyrica  Zantac  Zocor  Spiriva    Past Medical History:                       Anxiety  Arthritis  Basal cell carcinoma of skin               Breast CA   CKD  Chronic pain syndrome  Colon polyps               Congestive heart failure (H)                COPD  Coronary artery disease                     Depression  Diabetes   Gastrointestinal hemorrhage  GERD  Hyperlipidemia                        Hypertension               Lumbar spinal stenosis                       Nicotine dependence              NSTEMI  Obesity                        Osteoporosis               Pacemaker                  Permanent atrial fibrillation   PVD  Rectal stenosis                       Tobacco abuse                       Tubular adenoma  Tricuspid regurgitation                           Past Surgical History:    Arthroscopy shoulder   H ablation AV node  Implant pacemaker  Joint replacement  Laparoscopic cholecystectomy with cholangiograms  Lumpectomy breast  Phacoemulsification clear cornea with standard  "intraocular lends implant   Phacoemulsification clear cornea with toric intraocular lends implant   Left hip edison-arthroplasty     Family History:    Hypertension  Diabetes  CAD    Social History:  Former smoker.  Negative for alcohol use.  Marital Status:   [5]     Review of Systems   Musculoskeletal:        Left leg pain   Skin: Positive for wound.   All other systems reviewed and are negative.      Physical Exam   First Vitals:  BP: 116/65  Pulse: 71  Heart Rate: 71  Temp: 98  F (36.7  C)  Resp: 16  Height: 165.1 cm (5' 5\")  Weight: 81.6 kg (180 lb)  SpO2: 93 %      Physical Exam  Constitutional: Alert, attentive, GCS 15  HENT:    Nose: Nose normal.    Mouth/Throat: Oropharynx is clear, mucous membranes are moist   Eyes: EOM are normal, anicteric, conjugate gaze  CV: regular rate and rhythm; no murmurs  Chest: Effort normal and breath sounds clear without wheezing or rales, symmetric bilaterally   GI:  non tender. No distension. No guarding or rebound.    MSK: Diffuse symmetric LE pitting edema 3+ to hips. circumferential nontender erythema bilateral LE  Left lateral hip incision is clean and intact with a small, 3 mm area of oozing with bloody serous fluid, no evidence of infection or induration  Neurological: Alert, attentive, moving all extremities equally.   Skin: Skin is warm and dry.       Emergency Department Course   Interventions:  523  mg left nostril   Oxycodone 5 mg PO  Medications   tranexamic acid (CYKLOKAPRON) spray 500 mg (500 mg Left nostril Given 18)   oxyCODONE IR (ROXICODONE) tablet 5 mg (5 mg Oral Given 18)         Impression & Plan    Medical Decision Makin-year-old female with past medical history significant for A. fib, on Eliquis, status post pacemaker, CHF, hypertension, COPD, hyperlipidemia known coronary disease who presents for evaluation of bleeding from her incision on her left hip as she is several weeks out from a left hemiarthroplasty " after a fall.  She recently returned home from her TCU or assisted care living and has home health nursing of cancer.  She denies any new trauma, no fevers and noticed weeping from her wound.  On exam her incision is clean and intact without evidence of infection however on the inferior margin there is approximately 3 mm area of oozing serosanguineous drainage.  After holding direct pressure along with TXa soaked gauze the oozing was decreased if not resolved in 3 Steri-Strips were applied for reapproximation.  I suspect that the majority of oozing is serous fluid due to significant lower extremity, symmetric swelling for which the patient is on increased doses of her most mind.  Given absence of significant bleeding or signs of infection, patient was felt safe for discharge back to her assisted living and close follow-up with her PCP.  Return precautions were reviewed including repeat bleeding not resolved with direct firm pressure or evidence of infection.  Transferred back to her assisted living via BLS.    Diagnosis:    ICD-10-CM    1. Draining postoperative wound, initial encounter T81.89XA        Disposition:  discharged to home    Eitan Buckley MD   [employer]  6:12 AM 11/13/18       Scribe Disclosure:  IDavid, am serving as a scribe on 11/13/2018 at 4:09 AM to personally document services performed by Eitan Buckley MD based on my observations and the provider's statements to me.          David Morales  11/13/2018    EMERGENCY DEPARTMENT       Eitan Buckley MD  11/13/18 0613

## 2018-11-13 NOTE — DISCHARGE INSTRUCTIONS
Should you have more oozing or weeping from your incision, you should apply direct firm pressure for 10-20 minutes with clean gauze.  If you have persistent oozing or weeping despite this or note significant bleeding from your wound you should return to the emergency department.  Should keep a close eye on your wound for evidence of infection, including increased redness, swelling, pain or drainage from the wound that looks more like pus.  You should make an appointment to follow-up with your primary doctor for recheck of your wound in 1-2 days.

## 2018-11-13 NOTE — ED AVS SNAPSHOT
Emergency Department    [address] [place] [address]    Phone:  [phone]    Fax:  [phone]                                       Helene Gibbs   MRN: [id number]    Department:   Emergency Department   Date of Visit:  11/13/2018           After Visit Summary Signature Page     I have received my discharge instructions, and my questions have been answered. I have discussed any challenges I see with this plan with the nurse or doctor.    ..........................................................................................................................................  Patient/Patient Representative Signature      ..........................................................................................................................................  Patient Representative Print Name and Relationship to Patient    ..................................................               ................................................  Date                                   Time    ..........................................................................................................................................  Reviewed by Signature/Title    ...................................................              ..............................................  Date                                               Time          22EPIC Rev 08/18

## 2018-11-13 NOTE — ED AVS SNAPSHOT
Emergency Department    [address] [facility] [address]    Phone:  [phone]    Fax:  [phone]                                       Helene Gibbs   MRN: [id number]    Department:   Emergency Department   Date of Visit:  11/13/2018           Patient Information     Date Of Birth          1935        Your diagnoses for this visit were:     Draining postoperative wound, initial encounter        You were seen by Eitan Buckley MD.      Follow-up Information     Follow up with Neisha Esparza MD. Schedule an appointment as soon as possible for a visit in 2 days.    Specialty:  Internal Medicine    Why:  Recheck left hip incision     Contact information:    [address] [address] [phone]          Follow up with  Emergency Department.    Specialty:  EMERGENCY MEDICINE    Why:  As needed, If symptoms worsen    Contact information:    [address] [facility] [address] [phone]        Discharge Instructions       Should you have more oozing or weeping from your incision, you should apply direct firm pressure for 10-20 minutes with clean gauze.  If you have persistent oozing or weeping despite this or note significant bleeding from your wound you should return to the emergency department.  Should keep a close eye on your wound for evidence of infection, including increased redness, swelling, pain or drainage from the wound that looks more like pus.  You should make an appointment to follow-up with your primary doctor for recheck of your wound in 1-2 days.    Your next 10 appointments already scheduled     Nov 15, 2018 11:00 AM CST   Office Visit with Neisha Esparza MD   [facility]    [address] [facility] [address] [phone]           Bring a current list of meds and any records pertaining to this visit. For Physicals, please bring immunization records and any  forms needing to be filled out. Please arrive 10 minutes early to complete paperwork.            Nov 19, 2018  1:30 PM CST   New Visit with Hector Moran MD   [facility]    [address] [place] [id number] [phone]              24 Hour Appointment Hotline       To make an appointment at any [facility], call [phone] ([phone]). If you don't have a family doctor or clinic, we will help you find one. [facility] are conveniently located to serve the needs of you and your family.             Review of your medicines      Our records show that you are taking the medicines listed below. If these are incorrect, please call your family doctor or clinic.        Dose / Directions Last dose taken    ACCU-CHEK SMARTVIEW test strip   Quantity:  200 strip   Generic drug:  blood glucose monitoring        USE 1 STRIP BY IN VITRO ROUTE 2 TIMES DAILY OR AS DIRECTED   Refills:  0        albuterol 108 (90 Base) MCG/ACT inhaler   Commonly known as:  PROAIR HFA/PROVENTIL HFA/VENTOLIN HFA   Dose:  2 puff   Quantity:  1 Inhaler        Inhale 2 puffs into the lungs every 4 hours as needed for shortness of breath / dyspnea or wheezing   Refills:  5        ASPIRIN PO   Dose:  81 mg        Take 81 mg by mouth daily   Refills:  0        bisacodyl 5 MG EC tablet   Commonly known as:  DULCOLAX   Dose:  10 mg        Take 10 mg by mouth every evening   Refills:  0        blood glucose monitoring meter device kit   Commonly known as:  no brand specified   Quantity:  1 kit        Accucheck Mariza meter; verified with pharmacist   Refills:  0        CLINDAMYCIN HCL PO        Give 300 mg by mouth as needed for Dental Infection Prophylaxis Give 1 tablet (300mg) 4 times daily as needed. To be given the day prior to dental procedure   Refills:  0        ELIQUIS 2.5 MG tablet   Generic drug:  apixaban ANTICOAGULANT        Refills:  0        enoxaparin 40 MG/0.4ML  injection   Commonly known as:  LOVENOX   Dose:  40 mg        Inject 0.4 mLs (40 mg) Subcutaneous every 24 hours   Refills:  0        fluticasone 50 MCG/ACT spray   Commonly known as:  FLONASE   Dose:  1-2 spray        Spray 1-2 sprays into both nostrils daily as needed for rhinitis or allergies   Refills:  0        FUROSEMIDE PO   Dose:  40 mg        Take 40 mg by mouth daily   Refills:  0        GLUCOTROL PO   Dose:  5 mg        Take 5 mg by mouth every morning (before breakfast)   Refills:  0        guaiFENesin 600 MG 12 hr tablet   Commonly known as:  MUCINEX   Dose:  600 mg   Quantity:  28 tablet        Take 1 tablet (600 mg) by mouth 2 times daily   Refills:  0        hydrOXYzine 25 MG capsule   Commonly known as:  VISTARIL   Dose:  25 mg   Quantity:  180 capsule        Take 1 capsule (25 mg) by mouth 2 times daily as needed for itching   Refills:  1        Lidocaine 4 % Patch   Commonly known as:  LIDOCARE   Dose:  3 patch        Place 3 patches onto the skin daily as needed for moderate pain   Refills:  0        metoclopramide 5 MG tablet   Commonly known as:  REGLAN   Dose:  5 mg   Quantity:  20 tablet        Take 1 tablet (5 mg) by mouth 3 times daily as needed   Refills:  0        metoprolol tartrate 25 MG tablet   Commonly known as:  LOPRESSOR   Dose:  25 mg   Quantity:  180 tablet        Take 1 tablet (25 mg) by mouth 2 times daily   Refills:  3        nystatin 199223 UNIT/ML suspension   Commonly known as:  MYCOSTATIN   Dose:  009303 Units   Quantity:  280 mL        Swish and swallow 1 mL (100,000 Units) in mouth 4 times daily   Refills:  0        ondansetron 4 MG ODT tab   Commonly known as:  ZOFRAN ODT   Dose:  4 mg   Quantity:  30 tablet        Take 1 tablet (4 mg) by mouth every 6 hours as needed for nausea   Refills:  1        oxyCODONE IR 5 MG tablet   Commonly known as:  ROXICODONE   Dose:  5 mg   Quantity:  30 tablet        Take 1 tablet (5 mg) by mouth every 4 hours as needed for moderate to  severe pain   Refills:  0        polyethylene glycol Packet   Commonly known as:  MIRALAX/GLYCOLAX   Dose:  17 g   Quantity:  7 packet        Take 17 g by mouth 2 times daily   Refills:  0        potassium chloride 20 MEQ Packet   Commonly known as:  KLOR-CON   Dose:  20 mEq        Take 20 mEq by mouth daily   Refills:  0        pregabalin 75 MG capsule   Commonly known as:  LYRICA   Dose:  75 mg   Quantity:  60 capsule        Take 1 capsule (75 mg) by mouth 2 times daily   Refills:  3        ranitidine 150 MG tablet   Commonly known as:  ZANTAC   Quantity:  180 tablet        TAKE ONE TABLET BY MOUTH TWO TIMES A DAY   Refills:  3        senna-docusate 8.6-50 MG per tablet   Commonly known as:  SENOKOT-S;PERICOLACE   Dose:  2 tablet   Quantity:  40 tablet        Take 2 tablets by mouth 2 times daily   Refills:  0        simvastatin 10 MG tablet   Commonly known as:  ZOCOR   Quantity:  90 tablet        TAKE 1 TABLET (10 MG) BY MOUTH AT BEDTIME   Refills:  2        tiotropium 18 MCG capsule   Commonly known as:  SPIRIVA   Dose:  18 mcg   Quantity:  30 capsule        Inhale 1 capsule (18 mcg) into the lungs every evening   Refills:  11        TYLENOL PO   Dose:  650 mg        Take 650 mg by mouth 3 times daily   Refills:  0                Orders Needing Specimen Collection     None      Pending Results     No orders found from 11/11/2018 to 11/14/2018.            Pending Culture Results     No orders found from 11/11/2018 to 11/14/2018.            Pending Results Instructions     If you had any lab results that were not finalized at the time of your Discharge, you can call the ED Lab Result RN at [phone]. You will be contacted by this team for any positive Lab results or changes in treatment. The nurses are available 7 days a week from 10A to 6:30P.  You can leave a message 24 hours per day and they will return your call.        Test Results From Your Hospital Stay               Clinical Quality Measure: Blood  Pressure Screening     Your blood pressure was checked while you were in the emergency department today. The last reading we obtained was  BP: 116/65 . Please read the guidelines below about what these numbers mean and what you should do about them.  If your systolic blood pressure (the top number) is less than 120 and your diastolic blood pressure (the bottom number) is less than 80, then your blood pressure is normal. There is nothing more that you need to do about it.  If your systolic blood pressure (the top number) is 120-139 or your diastolic blood pressure (the bottom number) is 80-89, your blood pressure may be higher than it should be. You should have your blood pressure rechecked within a year by a primary care provider.  If your systolic blood pressure (the top number) is 140 or greater or your diastolic blood pressure (the bottom number) is 90 or greater, you may have high blood pressure. High blood pressure is treatable, but if left untreated over time it can put you at risk for heart attack, stroke, or kidney failure. You should have your blood pressure rechecked by a primary care provider within the next 4 weeks.  If your provider in the emergency department today gave you specific instructions to follow-up with your doctor or provider even sooner than that, you should follow that instruction and not wait for up to 4 weeks for your follow-up visit.        Thank you for choosing [facility]       Thank you for choosing [facility] for your care. Our goal is always to provide you with excellent care. Hearing back from our patients is one way we can continue to improve our services. Please take a few minutes to complete the written survey that you may receive in the mail after you visit with us. Thank you!        [facility] Information     [facility] gives you secure access to your electronic health record. If you see a primary care provider, you can also send messages to your care team and make appointments. If you  have questions, please call your primary care clinic.  If you do not have a primary care provider, please call [phone] and they will assist you.        Care EveryWhere ID     This is your Care EveryWhere ID. This could be used by other organizations to access your [place] medical records  [id number]        Equal Access to Services     TONG TUCKER : Yariel Brady, jefe valentino, pamela craft. So [facility] [phone].    ATENCIÓN: Si habla español, tiene a gupta disposición servicios gratuitos de asistencia lingüística. Llame al [phone].    We comply with applicable federal civil rights laws and [place] laws. We do not discriminate on the basis of race, color, national origin, age, disability, sex, sexual orientation, or gender identity.            After Visit Summary       This is your record. Keep this with you and show to your community pharmacist(s) and doctor(s) at your next visit.

## 2018-11-14 NOTE — PROGRESS NOTES
Clinic Care Coordination Contact    Clinic Care Coordination Contact  OUTREACH    Referral Information:       Primary Diagnosis: Orthopedic    Chief Complaint   Patient presents with     Clinic Care Coordination - Follow-up     Discharged from TCU        [place] Utilization:   Clinic Utilization  Compliance Concerns: No  No-Show Concerns: No  No PCP office visit in Past Year: No  Utilization    Last refreshed: 11/14/2018 11:13 AM:  No Show Count (past year) 0       Last refreshed: 11/14/2018 11:13 AM:  ED visits 6       Last refreshed: 11/14/2018 11:13 AM:  Hospital admissions 4          Current as of: 11/14/2018 11:13 AM             Clinical Concerns:  Current Medical Concerns:  Wound care.    Current Behavioral Concerns: Patient denied having been to ED yesterday, presenting as confused and/or forgetful.    Education Provided to patient: [facility] role explained.   Pain  Pain (GOAL):: Yes  Limitation of routine activities due to chronic pain:: Yes  Health Maintenance Reviewed:    Diet/Exercise/Sleep:  Food Insecurity: No  Tube Feeding: No  Inadequate activity/exercise (GOAL):: No  Significant changes in sleep pattern (GOAL): No    Transportation:  Transportation concerns (GOAL):: No  Transportation means:: Regular car, Family     Psychosocial:        Financial/Insurance:   Financial/Insurance concerns (GOAL):: No     Resources and Interventions:  Current Resources:   List of home care services:: Skilled Nursing, Home Health Aid, Physicial Therapy, Occupational Therapy;   Community Resources: Home Care     Equipment Currently Used at Home: walker, rolling               Goals:   Goals        Diet    Have 3 meals a day     Notes - Note created  5/23/2018 10:20 AM by Crystal Burciaga RN    Goal Statement: I will try to eat 3 small meals a day   Measure of Success: PCP will continue to weigh the patient at her appointments and monitor for increased weight loss.   Supportive Steps to Achieve: Education on the importance  of eating 3 meals day.   Barriers: Loss of appetite stated by the patient.   Strengths: Patient moving to [facility] in July with meals included.   Date to Achieve By: August 1, 2018          General    Healthy Eating (pt-stated)     Pain Management (pt-stated)     Pain Management (pt-stated)     Notes - Note created  5/23/2018 10:17 AM by Crystal Burciaga RN    Goal Statement: I will continue to take my pain medication as prescribed. I will call my physician if the pain gets worse.   Measure of Success: Patient will stay out of the ED. Patient will call her PCP if needed. RN CC will continue to call the patient to assess how her pain is.   Supportive Steps to Achieve: RN CC will reach out monthly to assess pain. Patient will continue to keep her appointments.   Strengths: Good PCP support. Appointment scheduled with [facility].   Date to Achieve By: August 1, 2018      start date: 4/17/14 I will weigh myself daily and if any weight gain or shortness of breath I will call the clinic. (pt-stated)     Notes - Note created  4/17/2014  3:59 PM by Margareth Pichardo, RN    As of today's date 4/17/2014 goal is met at 0 - 25%.   Goal Status:  Active              Patient/Caregiver understanding: Pt reports understanding and denies any additional questions or concerns at this times. SW CC engaged in [facility] communication during encounter.    Outreach Frequency: weekly  Future Appointments              [facility]    In 5 days Hector Moran MD [facility]           Plan: [facility] outreach in one week to review wound care progress and home health care support provided.    Santa Pacheco, [facility]  Clinic Care Coordinator - [facility]  Ph: [phone]  [email or web address]  (Today's date: 11/14/18)

## 2018-11-15 NOTE — MR AVS SNAPSHOT
After Visit Summary   11/15/2018    Helene Gibbs    MRN: [id number]           Patient Information     Date Of Birth          1935        Visit Information        Provider Department      11/15/2018 11:00 AM Neisha Esparza MD [facility]        Today's Diagnoses     Type 2 diabetes mellitus with diabetic nephropathy, without long-term current use of insulin (H)    -  1    CKD (chronic kidney disease) stage 3, GFR 30-59 ml/min (H)        (HFpEF) heart failure with preserved ejection fraction (H)        Edema of lower extremity          Care Instructions    Changed dressing on scar today  Stop furosemide  Take torsemide instead  Hod diuretics if you are very dehydrated  Hold lisinopril for the time being until kidney function improves  Hold metoprolol if your systolic blood pressure below 100  Follow up in one month  Seek sooner medical attention if there is any worsening of symptoms or problems          Follow-ups after your visit        Follow-up notes from your care team     Return in about 1 month (around 12/15/2018) for BP Recheck, medication follow up.      Your next 10 appointments already scheduled     Nov 19, 2018  1:30 PM CST   [facility]    [address] [place] [address] [phone]              Who to contact     If you have questions or need follow up information about today's clinic visit or your schedule please contact [facility] directly at [phone].  Normal or non-critical lab and imaging results will be communicated to you by MyChart, letter or phone within 4 business days after the clinic has received the results. If you do not hear from us within 7 days, please contact the clinic through MyChart or phone. If you have a critical or abnormal lab result, we will notify you by phone as soon as possible.  Submit refill requests through [employer] or  "call your pharmacy and they will forward the refill request to us. Please allow 3 business days for your refill to be completed.          Additional Information About Your Visit        JouleXhart Information     [facility] gives you secure access to your electronic health record. If you see a primary care provider, you can also send messages to your care team and make appointments. If you have questions, please call your primary care clinic.  If you do not have a primary care provider, please call [phone] and they will assist you.        Care EveryWhere ID     This is your Care EveryWhere ID. This could be used by other organizations to access your [place] medical records  [id number]        Your Vitals Were     Pulse Temperature Height Last Period Pulse Oximetry Breastfeeding?    74 97.6  F (36.4  C) (Tympanic) 5' 5\" (1.651 m) (LMP Unknown) 95% No    BMI (Body Mass Index)                   30.79 kg/m2            Blood Pressure from Last 3 Encounters:   11/15/18 93/51   11/13/18 116/65   11/05/18 140/76    Weight from Last 3 Encounters:   11/13/18 185 lb (83.9 kg)   11/05/18 184 lb 9.6 oz (83.7 kg)   10/29/18 195 lb 14.4 oz (88.9 kg)              We Performed the Following     Basic metabolic panel          Today's Medication Changes          These changes are accurate as of 11/15/18 11:46 AM.  If you have any questions, ask your nurse or doctor.               Start taking these medicines.        Dose/Directions    torsemide 20 MG tablet   Commonly known as:  DEMADEX   Used for:  (HFpEF) heart failure with preserved ejection fraction (H)   Started by:  Neisha Esparza MD        Dose:  20 mg   Take 1 tablet (20 mg) by mouth daily   Quantity:  30 tablet   Refills:  1         Stop taking these medicines if you haven't already. Please contact your care team if you have questions.     FUROSEMIDE PO   Stopped by:  Neisha Esparza MD                Where to get your medicines      These medications were sent to " [id number] - [place], [place] - [address]  [address] [address]     Phone:  [phone]     torsemide 20 MG tablet                Primary Care Provider Office Phone # Fax #    Neisha Esparza [phone] [address]  [place]                [place] [address]        Goals        Diet    Have 3 meals a day     Notes - Note created  5/23/2018 10:20 AM by Crystal Burciaga RN    Goal Statement: I will try to eat 3 small meals a day   Measure of Success: PCP will continue to weigh the patient at her appointments and monitor for increased weight loss.   Supportive Steps to Achieve: Education on the importance of eating 3 meals day.   Barriers: Loss of appetite stated by the patient.   Strengths: Patient moving to [facility] in July with meals included.   Date to Achieve By: August 1, 2018          General    Healthy Eating (pt-stated)     Pain Management (pt-stated)     Pain Management (pt-stated)     Notes - Note created  5/23/2018 10:17 AM by Crystal Burciaga RN    Goal Statement: I will continue to take my pain medication as prescribed. I will call my physician if the pain gets worse.   Measure of Success: Patient will stay out of the ED. Patient will call her PCP if needed. RN CC will continue to call the patient to assess how her pain is.   Supportive Steps to Achieve: RN CC will reach out monthly to assess pain. Patient will continue to keep her appointments.   Strengths: Good PCP support. Appointment scheduled with [facility].   Date to Achieve By: August 1, 2018      start date: 4/17/14 I will weigh myself daily and if any weight gain or shortness of breath I will call the clinic. (pt-stated)     Notes - Note created  4/17/2014  3:59 PM by Margareth Pichardo, RN    As of today's date 4/17/2014 goal is met at 0 - 25%.   Goal Status:  Active        Equal Access to Services     [facility]: Yariel Brady, waaxda luqadaha, qaybta kaalmada maddy, pamela grajeda  carl ramos charlenehans del castilloaakristina ah. So [facility] [phone].    ATENCIÓN: Si nicolla prema, tiene a gupta disposición servicios gratuitos de asistencia lingüística. Maxim al [phone].    We comply with applicable federal civil rights laws and [place] laws. We do not discriminate on the basis of race, color, national origin, age, disability, sex, sexual orientation, or gender identity.            Thank you!     Thank you for choosing [facility]  for your care. Our goal is always to provide you with excellent care. Hearing back from our patients is one way we can continue to improve our services. Please take a few minutes to complete the written survey that you may receive in the mail after your visit with us. Thank you!             Your Updated Medication List - Protect others around you: Learn how to safely use, store and throw away your medicines at www.disposemymeds.org.          This list is accurate as of 11/15/18 11:46 AM.  Always use your most recent med list.                   Brand Name Dispense Instructions for use Diagnosis    ACCU-CHEK SMARTVIEW test strip   Generic drug:  blood glucose monitoring     200 strip    USE 1 STRIP BY IN VITRO ROUTE 2 TIMES DAILY OR AS DIRECTED    Type 2 diabetes mellitus with diabetic nephropathy (H)       albuterol 108 (90 Base) MCG/ACT inhaler    PROAIR HFA/PROVENTIL HFA/VENTOLIN HFA    1 Inhaler    Inhale 2 puffs into the lungs every 4 hours as needed for shortness of breath / dyspnea or wheezing    COPD (chronic obstructive pulmonary disease) (H)       ASPIRIN PO      Take 81 mg by mouth daily        bisacodyl 5 MG EC tablet    DULCOLAX     Take 10 mg by mouth every evening        blood glucose monitoring meter device kit    no brand specified    1 kit    Accucheck Mariza meter; verified with pharmacist    Type 2 diabetes mellitus with diabetic nephropathy (H)       CLINDAMYCIN HCL PO      Give 300 mg by mouth as needed for Dental Infection Prophylaxis Give 1 tablet  (300mg) 4 times daily as needed. To be given the day prior to dental procedure        ELIQUIS 2.5 MG tablet   Generic drug:  apixaban ANTICOAGULANT           enoxaparin 40 MG/0.4ML injection    LOVENOX     Inject 0.4 mLs (40 mg) Subcutaneous every 24 hours    Closed fracture of left hip, initial encounter (H)       fluticasone 50 MCG/ACT spray    FLONASE     Spray 1-2 sprays into both nostrils daily as needed for rhinitis or allergies        GLUCOTROL PO      Take 5 mg by mouth every morning (before breakfast)        guaiFENesin 600 MG 12 hr tablet    MUCINEX    28 tablet    Take 1 tablet (600 mg) by mouth 2 times daily    Pneumonia due to infectious organism, unspecified laterality, unspecified part of lung       hydrOXYzine 25 MG capsule    VISTARIL    180 capsule    Take 1 capsule (25 mg) by mouth 2 times daily as needed for itching    Itching       Lidocaine 4 % Patch    LIDOCARE     Place 3 patches onto the skin daily as needed for moderate pain        metoclopramide 5 MG tablet    REGLAN    20 tablet    Take 1 tablet (5 mg) by mouth 3 times daily as needed        metoprolol tartrate 25 MG tablet    LOPRESSOR    180 tablet    Take 1 tablet (25 mg) by mouth 2 times daily    NSTEMI (non-ST elevated myocardial infarction) (H)       nystatin 748752 UNIT/ML suspension    MYCOSTATIN    280 mL    Swish and swallow 1 mL (100,000 Units) in mouth 4 times daily    Thrush       ondansetron 4 MG ODT tab    ZOFRAN ODT    30 tablet    Take 1 tablet (4 mg) by mouth every 6 hours as needed for nausea    Nausea       oxyCODONE IR 5 MG tablet    ROXICODONE    30 tablet    Take 1 tablet (5 mg) by mouth every 4 hours as needed for moderate to severe pain    Closed fracture of left hip, initial encounter (H)       polyethylene glycol Packet    MIRALAX/GLYCOLAX    7 packet    Take 17 g by mouth 2 times daily    Constipation, unspecified constipation type       potassium chloride 20 MEQ Packet    KLOR-CON     Take 20 mEq by mouth  daily        pregabalin 75 MG capsule    LYRICA    60 capsule    Take 1 capsule (75 mg) by mouth 2 times daily    Neuropathy       ranitidine 150 MG tablet    ZANTAC    180 tablet    TAKE ONE TABLET BY MOUTH TWO TIMES A DAY    Dyspepsia       senna-docusate 8.6-50 MG per tablet    SENOKOT-S;PERICOLACE    40 tablet    Take 2 tablets by mouth 2 times daily    Closed fracture of left hip, initial encounter (H)       simvastatin 10 MG tablet    ZOCOR    90 tablet    TAKE 1 TABLET (10 MG) BY MOUTH AT BEDTIME    Type 2 diabetes mellitus with diabetic nephropathy, without long-term current use of insulin (H)       tiotropium 18 MCG capsule    SPIRIVA    30 capsule    Inhale 1 capsule (18 mcg) into the lungs every evening    Chronic obstructive pulmonary disease, unspecified COPD type (H)       torsemide 20 MG tablet    DEMADEX    30 tablet    Take 1 tablet (20 mg) by mouth daily    (HFpEF) heart failure with preserved ejection fraction (H)       TYLENOL PO      Take 650 mg by mouth 3 times daily

## 2018-11-15 NOTE — PATIENT INSTRUCTIONS
Changed dressing on scar today  Stop furosemide  Take torsemide instead  Hod diuretics if you are very dehydrated  Hold lisinopril for the time being until kidney function improves  Hold metoprolol if your systolic blood pressure below 100  Follow up in one month  Seek sooner medical attention if there is any worsening of symptoms or problems

## 2018-11-16 NOTE — PROGRESS NOTES
Shakira Narayan,    This is to inform you regarding your test result.    I tried to contact you but got the VM.  Your creatinine is going up and GFR is low  Do not resume lisinopril  Will recheck in a month    Sincerely,      Dr.Nasima Vilma MD,FACP

## 2018-11-19 NOTE — MR AVS SNAPSHOT
After Visit Summary   11/19/2018    Helene Gibbs    MRN: [id number]           Patient Information     Date Of Birth          1935        Visit Information        Provider Department      11/19/2018 1:30 PM Hector Moran MD [facility]        Today's Diagnoses     Age-related osteoporosis with current pathological fracture, initial encounter    -  1    History of hyperparathyroidism           Follow-ups after your visit        Follow-up notes from your care team     Return in about 6 months (around 5/19/2019).      Your next 10 appointments already scheduled     Dec 19, 2018 12:30 PM CST   Office Visit with Neisha Esparza MD   [facility] ([facility])    [address] [facility] [address] [phone]           Bring a current list of meds and any records pertaining to this visit. For Physicals, please bring immunization records and any forms needing to be filled out. Please arrive 10 minutes early to complete paperwork.              Who to contact     If you have questions or need follow up information about today's clinic visit or your schedule please contact [facility] directly at [phone].  Normal or non-critical lab and imaging results will be communicated to you by MyChart, letter or phone within 4 business days after the clinic has received the results. If you do not hear from us within 7 days, please contact the clinic through "[employer]hart or phone. If you have a critical or abnormal lab result, we will notify you by phone as soon as possible.  Submit refill requests through [employer]is or call your pharmacy and they will forward the refill request to us. Please allow 3 business days for your refill to be completed.          Additional Information About Your Visit        MyChart Information     [employer] gives you secure access to your electronic health record. If you see a primary care provider, you can also send messages to your  "care team and make appointments. If you have questions, please call your primary care clinic.  If you do not have a primary care provider, please call [phone] and they will assist you.        Care EveryWhere ID     This is your Care EveryWhere ID. This could be used by other organizations to access your [place] medical records  [id number]        Your Vitals Were     Pulse Height Last Period BMI (Body Mass Index)          69 1.651 m (5' 5\") (LMP Unknown) 30.29 kg/m2         Blood Pressure from Last 3 Encounters:   11/19/18 102/59   11/15/18 93/51   11/13/18 116/65    Weight from Last 3 Encounters:   11/19/18 82.6 kg (182 lb)   11/15/18 85.3 kg (188 lb)   11/13/18 83.9 kg (185 lb)              We Performed the Following     25- OH-Vitamin D     Basic metabolic panel     Magnesium     Parathyroid Hormone Intact     Phosphorus     Protein electrophoresis        Primary Care Provider Office Phone # Fax #    Neisha MARIMAR Esparza [phone] [address] [facility] [address]  [place]                [place] [address]        Goals        Diet    Have 3 meals a day     Notes - Note created  5/23/2018 10:20 AM by Crystal Burciaga RN    Goal Statement: I will try to eat 3 small meals a day   Measure of Success: PCP will continue to weigh the patient at her appointments and monitor for increased weight loss.   Supportive Steps to Achieve: Education on the importance of eating 3 meals day.   Barriers: Loss of appetite stated by the patient.   Strengths: Patient moving to [facility] in July with meals included.   Date to Achieve By: August 1, 2018          General    Healthy Eating (pt-stated)     Pain Management (pt-stated)     Pain Management (pt-stated)     Notes - Note created  5/23/2018 10:17 AM by Crystal Burciaga RN    Goal Statement: I will continue to take my pain medication as prescribed. I will call my physician if the pain gets worse.   Measure of Success: Patient will stay out of the ED. Patient will call her PCP " if needed. RN CC will continue to call the patient to assess how her pain is.   Supportive Steps to Achieve: RN CC will reach out monthly to assess pain. Patient will continue to keep her appointments.   Strengths: Good PCP support. Appointment scheduled with [facility].   Date to Achieve By: August 1, 2018      start date: 4/17/14 I will weigh myself daily and if any weight gain or shortness of breath I will call the clinic. (pt-stated)     Notes - Note created  4/17/2014  3:59 PM by Margareth Pichardo RN    As of today's date 4/17/2014 goal is met at 0 - 25%.   Goal Status:  Active        Equal Access to Services     [facility]: Hadii sukhdeep mercer hadasho Sojovanyali, waaxda luqadaha, qaybta kaalmada adeegyada, pamela mccullough . So [facility] [phone].    ATENCIÓN: Si habla español, tiene a gupta disposición servicios gratuitos de asistencia lingüística. Llame al [phone].    We comply with applicable federal civil rights laws and [place] laws. We do not discriminate on the basis of race, color, national origin, age, disability, sex, sexual orientation, or gender identity.            Thank you!     Thank you for choosing [facility]  for your care. Our goal is always to provide you with excellent care. Hearing back from our patients is one way we can continue to improve our services. Please take a few minutes to complete the written survey that you may receive in the mail after your visit with us. Thank you!             Your Updated Medication List - Protect others around you: Learn how to safely use, store and throw away your medicines at www.disposemymeds.org.          This list is accurate as of 11/19/18  7:40 PM.  Always use your most recent med list.                   Brand Name Dispense Instructions for use Diagnosis    ACCU-CHEK SMARTVIEW test strip   Generic drug:  blood glucose monitoring     200 strip    USE 1 STRIP BY IN VITRO ROUTE 2 TIMES DAILY OR AS DIRECTED    Type 2  diabetes mellitus with diabetic nephropathy (H)       albuterol 108 (90 Base) MCG/ACT inhaler    PROAIR HFA/PROVENTIL HFA/VENTOLIN HFA    1 Inhaler    Inhale 2 puffs into the lungs every 4 hours as needed for shortness of breath / dyspnea or wheezing    COPD (chronic obstructive pulmonary disease) (H)       ASPIRIN PO      Take 81 mg by mouth daily        bisacodyl 5 MG EC tablet    DULCOLAX     Take 10 mg by mouth every evening        blood glucose monitoring meter device kit    no brand specified    1 kit    [employer] Mariza meter; verified with pharmacist    Type 2 diabetes mellitus with diabetic nephropathy (H)       CLINDAMYCIN HCL PO      Give 300 mg by mouth as needed for Dental Infection Prophylaxis Give 1 tablet (300mg) 4 times daily as needed. To be given the day prior to dental procedure        ELIQUIS 2.5 MG tablet   Generic drug:  apixaban ANTICOAGULANT           enoxaparin 40 MG/0.4ML injection    LOVENOX     Inject 0.4 mLs (40 mg) Subcutaneous every 24 hours    Closed fracture of left hip, initial encounter (H)       fluticasone 50 MCG/ACT spray    FLONASE     Spray 1-2 sprays into both nostrils daily as needed for rhinitis or allergies        GLUCOTROL PO      Take 5 mg by mouth every morning (before breakfast)        hydrOXYzine 25 MG capsule    VISTARIL    180 capsule    Take 1 capsule (25 mg) by mouth 2 times daily as needed for itching    Itching       Lidocaine 4 % Patch    LIDOCARE     Place 3 patches onto the skin daily as needed for moderate pain        lisinopril 10 MG tablet    PRINIVIL/ZESTRIL     Take 1 tablet (10 mg) by mouth daily    Type 2 diabetes mellitus with diabetic nephropathy, without long-term current use of insulin (H)       metoclopramide 5 MG tablet    REGLAN    20 tablet    Take 1 tablet (5 mg) by mouth 3 times daily as needed        metoprolol tartrate 25 MG tablet    LOPRESSOR    180 tablet    Take 1 tablet (25 mg) by mouth 2 times daily    NSTEMI (non-ST elevated  myocardial infarction) (H)       nystatin [id number] UNIT/ML suspension    MYCOSTATIN    280 mL    Swish and swallow 1 mL (100,000 Units) in mouth 4 times daily    Thrush       ondansetron 4 MG ODT tab    ZOFRAN ODT    30 tablet    Take 1 tablet (4 mg) by mouth every 6 hours as needed for nausea    Nausea       oxyCODONE IR 5 MG tablet    ROXICODONE    30 tablet    Take 1 tablet (5 mg) by mouth every 4 hours as needed for moderate to severe pain    Closed fracture of left hip, initial encounter (H)       polyethylene glycol Packet    MIRALAX/GLYCOLAX    7 packet    Take 17 g by mouth 2 times daily    Constipation, unspecified constipation type       potassium chloride 20 MEQ Packet    KLOR-CON     Take 20 mEq by mouth daily        * pregabalin 75 MG capsule    LYRICA    60 capsule    Take 1 capsule (75 mg) by mouth 2 times daily    Neuropathy       * LYRICA 100 MG capsule   Generic drug:  pregabalin           ranitidine 150 MG tablet    ZANTAC    180 tablet    TAKE ONE TABLET BY MOUTH TWO TIMES A DAY    Dyspepsia       senna-docusate 8.6-50 MG per tablet    SENOKOT-S;PERICOLACE    40 tablet    Take 2 tablets by mouth 2 times daily    Closed fracture of left hip, initial encounter (H)       simvastatin 10 MG tablet    ZOCOR    90 tablet    TAKE 1 TABLET (10 MG) BY MOUTH AT BEDTIME    Type 2 diabetes mellitus with diabetic nephropathy, without long-term current use of insulin (H)       tiotropium 18 MCG capsule    SPIRIVA    30 capsule    Inhale 1 capsule (18 mcg) into the lungs every evening    Chronic obstructive pulmonary disease, unspecified COPD type (H)       torsemide 20 MG tablet    DEMADEX    30 tablet    Take 1 tablet (20 mg) by mouth daily    (HFpEF) heart failure with preserved ejection fraction (H)       TYLENOL PO      Take 650 mg by mouth 3 times daily        * Notice:  This list has 2 medication(s) that are the same as other medications prescribed for you. Read the directions carefully, and ask your doctor  or other care provider to review them with you.

## 2018-11-19 NOTE — PROGRESS NOTES
Name: Helene Gibbs is a 83 year old woman, self referred for evaluation of     Chief Complaint   Patient presents with     Endocrine Problem     osteoporosis       HPI:  Recent issues:  Here for osteoporosis evaluation  Had left hip fracture then ORIF surgery 10/2018        Native of [place], lived in [facility], then [place]  2010. Moved back to [place]  Previous DEXA scans include:  11/18/14 DEXA:   L1-L4 T-score: 2.7     Lumbar bone mineral density readings could be falsely elevated due to compression fractures as well as degenerative disease.    Left Hip T-score: -2.3 and Right Hip T-score: -2.2   Recalls the scan was difficult for her to do, because of body positioning  Had seen Dr. SHANIQUE Jin/[facility] clinic for evaluations   Had seen him for approx 3 years   Records from the endocrinology evaluations not available  Previous osteoporosis medication use:   Does not recall getting oral osteoporosis medications in the past   Has received Prolia injections in the past    Estimates getting at least 2 injections previously, maybe more    Last injection 2017?     Health history includes:  Bone fractures:   Vertebral compression fractures- few    Recalls getting a vertebroplasty in the past   Left hip fracture after fall in her kitchen 10/2018  Vitamin D deficiency:  Yes?   Kidney stones:  None  Fam Hx:   Osteoporosis:  Mother, 2 sisters   Kidney stones: None   Hyperparathyroidism: None  Recent [facility] labs include:  Lab Results   Component Value Date     11/15/2018    POTASSIUM 4.2 11/15/2018    CHLORIDE 100 11/15/2018    CO2 28 11/15/2018    ANIONGAP 9 11/15/2018    GLC 93 11/15/2018    BUN 43 (H) 11/15/2018    CR 1.87 (H) 11/15/2018    GFRESTIMATED 26 (L) 11/15/2018    GFRESTBLACK 31 (L) 11/15/2018    GERARD 9.2 11/15/2018    TSH 1.56 04/25/2017    VITDT 43 10/27/2015    PTHI 104 (H) 09/22/2014     Supplements:   Takes multivitamin tablet daily   No specific calcium or vit D  supplement use  Other chronic illnesses include:   Diabetes:   Takes glipizide, followed by PCP   AFib:     Takes metoprolol medication, followed by cardiologist   Hypertension:  Takes lisinopril, torsemide, metoprolol medications, followed by PCP  Recent symptoms:   Some left hip pains and slowed walking gait with PT, pains at low back, weight gain (edema?)   Denies indigestion, GI symptoms      L[facility] at [facility]  Sees Dr. Neisha Esparza for general medicine evaluations.    PMH/PSH:  Past Medical History:   Diagnosis Date     Anemia      Ankle arthritis      Arthritis      Back pain      Bell's palsy 9/08    left     Breast CA (H) 2004-    left lumpectomy, radiation, -recurrence     CKD (chronic kidney disease)     stage 3 baseline Cr 1.3     Colon polyps     colonoscopy-9/12-next due in 9/13     Congestive heart failure (H)      COPD (chronic obstructive pulmonary disease) (H)      Coronary artery disease      DM (diabetes mellitus) (H)      GERD (gastroesophageal reflux disease)      Hyperlipidemia      Hypertension      Lumbar spinal stenosis     use cane     Nicotine dependence      Obesity      Osteoporosis      Other chronic pain      Pacemaker      Permanent atrial fibrillation (H) 8/2008    S/P AV node ablation and pacemaker 10-25-12       Pleural effusion      Pulmonary hypertension (H)      PVD (peripheral vascular disease) (H)      Rectal stenosis      Tobacco abuse     current     Tricuspid regurgitation      Past Surgical History:   Procedure Laterality Date     ARTHROPLASTY HIP Left 10/20/2018    Procedure: LEFT HIP HERACLIO-ARTHROPLASTY ;  Surgeon: Ish Fish MD;  Location:  OR     ARTHROSCOPY SHOULDER RT/LT  1999, 2004    Bilateral, right then left     BONE MARROW BIOPSY, BONE SPECIMEN, NEEDLE/TROCAR N/A 8/29/2017    Procedure: BIOPSY BONE MARROW;  BONE MARROW BIOPSY;  Surgeon: Marino Cohen MD;  Location:  GI     C DEXA, BONE DENSITY, AXIAL SKEL        C MAMMOGRAM, SCREENING  2009     COLONOSCOPY N/A 10/7/2016    Procedure: COMBINED COLONOSCOPY, SINGLE OR MULTIPLE BIOPSY/POLYPECTOMY BY BIOPSY;  Surgeon: Cassandra Mccord MD;  Location:  GI     ESOPHAGOSCOPY, GASTROSCOPY, DUODENOSCOPY (EGD), COMBINED N/A 8/10/2017    Procedure: COMBINED ESOPHAGOSCOPY, GASTROSCOPY, DUODENOSCOPY (EGD), BIOPSY SINGLE OR MULTIPLE;  gastroscopy;  Surgeon: Helene Bustamante MD;  Location:  GI     H ABLATION AV NODE  10/2012     HC COLONOSCOPY THRU STOMA, DIAGNOSTIC  ? 2005     IMPLANT PACEMAKER  10/2012    [place] [id number], [id number]     JOINT REPLACEMTN, KNEE RT/LT      Joint Replacement knee bilateral     LAPAROSCOPIC CHOLECYSTECTOMY WITH CHOLANGIOGRAMS N/A 2015    Procedure: LAPAROSCOPIC CHOLECYSTECTOMY WITH CHOLANGIOGRAMS;  Surgeon: Ervin Amos MD;  Location:  OR     LUMPECTOMY BREAST      Left-     PHACOEMULSIFICATION CLEAR CORNEA WITH STANDARD INTRAOCULAR LENS IMPLANT Left 2015    Procedure: PHACOEMULSIFICATION CLEAR CORNEA WITH STANDARD INTRAOCULAR LENS IMPLANT;  Surgeon: Sai Obregon MD;  Location: [facility]     PHACOEMULSIFICATION CLEAR CORNEA WITH TORIC INTRAOCULAR LENS IMPLANT Right 2017    Procedure: PHACOEMULSIFICATION CLEAR CORNEA WITH TORIC INTRAOCULAR LENS IMPLANT;  RIGHT EYE PHACOEMULSIFICATION CLEAR CORNEA WITH TORIC INTRAOCULAR LENS IMPLANT ;  Surgeon: Duc Richmond MD;  Location: [facility]       Family Hx:  Family History   Problem Relation Age of Onset     Hypertension Mother      Diabetes Mother       at 83 yrs     C.A.D. Father       age 70s     Breast Cancer No family hx of      Colon Cancer No family hx of          Social Hx:  Social History     Social History     Marital status:      Spouse name: N/A     Number of children: N/A     Years of education: N/A     Occupational History     Not on file.     Social History Main Topics     Smoking status: Former Smoker     Packs/day: 0.25     Years: 45.00      Types: Cigarettes     Smokeless tobacco: Never Used     Alcohol use No     Drug use: No     Sexual activity: Not Currently     Partners: Female     Other Topics Concern     Caffeine Concern Yes     2 cups/day     Sleep Concern Yes     Stress Concern Yes     Weight Concern Yes     15+ lb weight loss since hospitalization      Special Diet Yes     bland diet r/t cholecystectomy, low salt      Exercise No     Seat Belt Yes     Social History Narrative    ,no childrenPOA- niece-Enedelia Claros-has plans to quitETOH-1/drink 2-3 /weekExercise-sit down DNR, DNI    Lived in [place] and [place] worked in [employer]          MEDICATIONS:  has a current medication list which includes the following prescription(s): accu-chek smartview, acetaminophen, albuterol, aspirin, bisacodyl, blood glucose monitoring, eliquis, fluticasone, glipizide, lyrica, metoclopramide, metoprolol tartrate, nystatin, oxycodone ir, polyethylene glycol, potassium chloride, ranitidine, senna-docusate, simvastatin, tiotropium, torsemide, clindamycin hcl, enoxaparin, hydroxyzine, lidocaine, lisinopril, ondansetron, and pregabalin.    ROS:     ROS: 10 point ROS neg other than the symptoms noted above in the HPI.    GENERAL: some fatigue, wt stable; denies fevers, chills, night sweats.    HEENT: no dysphagia, odonophagia, diplopia, neck pain  THYROID:  no apparent hyper or hypothyroid symptoms  CV: no chest pain, pressure, palpitations  LUNGS: mild dyspnea; no SOB, cough, wheezing   ABDOMEN: no diarrhea, constipation, abdominal pain  EXTREMITIES: leg/ankle edema; no rashes, ulcers  NEUROLOGY: decreased sensation at feet; no headaches, denies changes in vision  MSK:  Mid and low back pains, left hip pain with ROM; denies specific muscle weakness  SKIN: no rashes or lesions  : no menses; denies hot flashes  PSYCH:  stable mood, no significant anxiety or depression  ENDOCRINE: no heat or cold intolerance    Physical Exam   VS: /59  " Pulse 69  Ht 1.651 m (5' 5\")  Wt 82.6 kg (182 lb)  LMP  (LMP Unknown)  BMI 30.29 kg/m2  GENERAL: AXOX3, NAD, seated in WC, well dressed, answering questions appropriately, appears stated age.  THYROID:  normal gland, no apparent nodules or goiter  HEENT: neck non-tender, no exopthalmous, no proptosis, EOMI  SPINE:  Some thoracic kyphosis  CV: RRR, with 2/6 systolic murmur left sternal border area  LUNGS:  Mild crackles L>R posterior lung fields, no wheezes  ABDOMEN: soft, nontender, nondistended  EXTREMITIES: mild ankle and lower leg edema, limited mobility left leg  NEUROLOGY: CN grossly intact, no tremors  MSK: grossly intact  SKIN: no rashes, no lesions    LABS:    All pertinent notes, labs, and images personally reviewed by me.     A/P:  Encounter Diagnoses   Name Primary?     Age-related osteoporosis with current pathological fracture, initial encounter Yes     History of hyperparathyroidism        Comments:  Reviewed complicated health history and osteoporosis issues.    Plan:  Discussed general issues with the osteoporosis diagnosis and management  Reviewed concept of using the DEXA scan imaging to assess bone mineral density (BMD)  Discussed terminology of the T-score   We discussed lab tests to assess for secondary causes of bone thinning  Reviewed common medication treatment options for osteoporosis    Recommend:  Need more information about her osteoporosis diagnosis and management, dates of previous Prolia (or other) osteoporosis med use   Will request recent progress notes from the ECM office, ELSY form signed   Discussed Prolia dosing protocol at our [facility] clinic  Check lab tests today  Reasonable to continue the Prolia 60 mg subcutaneous Q 6 month treatment plan  Although a repeat DEXA scan helpful, it is difficult for her to lay flat for this scan, at this time.  Will not order scan now  May need to resume use of calcium and vitamin D supplement use  Agree with her PT for left hip and walking " management  Avoid heavy lifting and fall injuries  Will summarize results and management recommendations soon    She asked about the Lyrica med use.  I advised she contact the person that prescribed it  F/u diabetes management with Dr. Esparza    Addressed patient questions today    Labs ordered today:   Orders Placed This Encounter   Procedures     25- [place]-Vitamin D     Parathyroid Hormone Intact     Protein electrophoresis     Basic metabolic panel     Magnesium     Phosphorus     Radiology/Consults ordered today: None    More than 50% of the time spent with Ms. Gibbs on counseling / coordinating her care.  Total appointment time was 60 minutes.    Follow-up:  6 mo.    Hector Moran MD  Endocrinology  [facility]/[place]  CC: Neisha Esparza

## 2018-11-21 NOTE — PROGRESS NOTES
"Clinic Care Coordination Contact    Clinic Care Coordination Contact  OUTREACH    Referral Information:       Primary Diagnosis: Orthopedic    Chief Complaint   Patient presents with     Clinic Care Coordination - Follow-up        [facility] Utilization:   Clinic Utilization  Compliance Concerns: No  No-Show Concerns: No  No PCP office visit in Past Year: No  Utilization    Last refreshed: 11/21/2018 12:49 PM:  No Show Count (past year) 0       Last refreshed: 11/21/2018 12:49 PM:  ED visits 6       Last refreshed: 11/21/2018 12:49 PM:  Hospital admissions 4          Current as of: 11/21/2018 12:49 PM             Clinical Concerns:  Current Medical Concerns:  Wound care    Current Behavioral Concerns: None known at this time    Education Provided to patient: CC role reviewed.   Pain  Pain (GOAL):: Yes  Limitation of routine activities due to chronic pain:: Yes  Health Maintenance Reviewed:      Patient states she is doing well with the care team. She stated that wound care is fine, dried up a lot, very little bleed off. Home care is there every day, checking vitals. At night, the \"girls in the building\" come and take off the stockings (put them on in the morning). Patient states she will see the doctor next week    Diet/Exercise/Sleep:  Food Insecurity: No  Tube Feeding: No  Inadequate activity/exercise (GOAL):: No  Significant changes in sleep pattern (GOAL): No    Transportation:  Transportation concerns (GOAL):: No  Transportation means:: Regular car, Family     Psychosocial:        Financial/Insurance:   Financial/Insurance concerns (GOAL):: No     Resources and Interventions:  Current Resources:   List of home care services:: Skilled Nursing, Home Health Aid, Physicial Therapy, Occupational Therapy;   Community Resources: Home Care     Equipment Currently Used at Home: walker, rolling               Goals:   Goals        Diet    Have 3 meals a day     Notes - Note created  5/23/2018 10:20 AM by Patrica " WILBERT Rojas    Goal Statement: I will try to eat 3 small meals a day   Measure of Success: PCP will continue to weigh the patient at her appointments and monitor for increased weight loss.   Supportive Steps to Achieve: Education on the importance of eating 3 meals day.   Barriers: Loss of appetite stated by the patient.   Strengths: Patient moving to [facility] in July with meals included.   Date to Achieve By: August 1, 2018          General    Healthy Eating (pt-stated)     Pain Management (pt-stated)     Pain Management (pt-stated)     Notes - Note created  5/23/2018 10:17 AM by Crystal Burciaga, WILBERT    Goal Statement: I will continue to take my pain medication as prescribed. I will call my physician if the pain gets worse.   Measure of Success: Patient will stay out of the ED. Patient will call her PCP if needed. RN CC will continue to call the patient to assess how her pain is.   Supportive Steps to Achieve: RN CC will reach out monthly to assess pain. Patient will continue to keep her appointments.   Strengths: Good PCP support. Appointment scheduled with [facility].   Date to Achieve By: August 1, 2018      start date: 4/17/14 I will weigh myself daily and if any weight gain or shortness of breath I will call the clinic. (pt-stated)     Notes - Note created  4/17/2014  3:59 PM by Margareth Pichardo, RN    As of today's date 4/17/2014 goal is met at 0 - 25%.   Goal Status:  Active              Patient/Caregiver understanding: Pt reports understanding and denies any additional questions or concerns at this times.  CC engaged in AIDET communication during encounter.       Future Appointments              In 4 weeks Neisha Esparza MD [facility]           Plan: [facility] will make no further outreaches at this time. Patient has been provided with [facility] contact info and instructions for CC service contact after November 30th when [facility] is transitioning out of role.    Santa Pacheco [facility]  Clinic Care Coordinator  - [place] [place], and [facility]  Ph: [phone]  [email or web address]  (Today's date: 11/21/18)

## 2018-11-22 NOTE — PATIENT INSTRUCTIONS
PROLIA  Risk Evaluation and Mitigation Strategy Best Practice Advisory    In May 2015, the FDA required an update to the PROLIA  (denosumab) REMS (Risk Evaluation and Mitigation Strategy) to inform both providers and patients about potential serious risks related to PROLIA .    These potential serious risks include:    Hypocalcemia    Osteonecrosis of the jaw    Atypical femoral fractures    Serious Infections    Dermatologic reactions    To ensure compliance with these requirements [facility] has developed a workflow for those patients who will receive PROLIA  at clinic.  This workflow includes insurance verification, patient scheduling, patient education, and documentation. Registered Nurses in the clinic should have completed eLearning related to the REMS and the clinic workflow.  Refer to them to initiate this process.  This workflow does not need to be executed if the patient is to receive PROLIA  injection at [facility].       The  has put together a Patient Education Toolkit.  Copies of these handouts may be available your clinic. Patients must receive the Patient Brochure and Medication Guide when receiving injection at clinic.  These will be attached to the injection ordered from [facility] [employer] to the clinic. Links to handouts:  Patient Counseling Chart for Healthcare Providers  Patient Brochure  Prescribing Information  Medication Guide    If you are having trouble accessing the above links, these documents can be found at: http://www.proliahcp.com/zvsw-qfrkbbhgxo-pdxsftkmfm-strategy/index.html    The role of healthcare provider includes reviewing patient education materials with the patient, advising patients to seek medical attention if they have signs or symptoms of one of the serious risks, and provide patients a copy of the Patient Brochure and Medication Guide when receiving their injection.      Due to the risk of hypocalcemia the Prescribing  Information for PROLIA  recommends that calcium and mineral levels (magnesium and phosphorus) be checked within 14 days of injection for those predisposed to hypocalcemia.

## 2018-11-26 NOTE — TELEPHONE ENCOUNTER
----- Message from Hector Moran MD sent at 11/22/2018  5:20 PM CST -----  Regarding: Prolia injection  This patient has a diagnosis of osteoporosis and needs to received Prolia injections at our clinic... Has had them previously... Last injection in 2017.  Please verify insurance coverage for the Prolia medication. Thanks.    TRACY Moran MD  Endocrinology  [facility]/[place]

## 2018-11-27 NOTE — TELEPHONE ENCOUNTER
Routed to Prior [facility] basket and Brandi Hinojosa per Brandi.  Radha Stiles RN

## 2018-11-29 NOTE — TELEPHONE ENCOUNTER
"Spoke with patient:   Saw [facility] surgeon for follow up today - he recommend patient ask PCP to increase diuretic dosage   20mg Torsemide isn't \"strong enough\" per pt   Edema in legs - lower half of legs \"are like canteloupe,\" less swelling on upper legs  165 lb before surgery   10/20/18 was surgery   Last weight was 182 lb about a week ago - does not check regularly (needs help to weigh herself)   Denies breathing concerns or SOB - does get winded with some activities but is able to do normal activity   States \"I know I'm not peeing enough\"   Denies fluid around abdomen   Denies chest pain   A nurse comes out \"almost daily\" to check on patient     Pt is requesting an increase in her torsemide dosage. Next visit is scheduled for 12/19/18- does not want to come in sooner as she has difficulty arranging a ride    [facility] [address] is her preferred pharmacy     Please advise   Shawanda REID RN    Next 5 appointments (look out 90 days)     Dec 19, 2018 12:30 PM CST   Office Visit with Neisha Esp[facility]za MD   [facility]    [address] [facility] [address] [phone]                  "

## 2018-11-29 NOTE — TELEPHONE ENCOUNTER
Reason for Call:  Medication or medication refill:    Do you use a [facility]?  Name of the pharmacy and phone number for the current request:       [id number] - [place], [place] - [address]    Name of the medication requested:   torsemide (DEMADEX) 20 MG tablet 30 tablet 1 11/15/2018  --   Sig - Route: Take 1 tablet (20 mg) by mouth daily - Oral   Class: E-Prescribe   Notes to Pharmacy: Discontinue furosemide   Order: [id number]         Other request: Pt seen the surgeon at [facility] that did surgery on her hip.  Dr. Cloud.  She is carrying around a lot of extra fluid. It is advised that she up/increase her water pill. Please advise and talk to patient.      Can we leave a detailed message on this number? YES    Phone number patient can be reached at: Home number on file [phone] (home)    Best Time: Any     Call taken on 11/29/2018 at 3:34 PM by Leena Garcia

## 2018-11-29 NOTE — TELEPHONE ENCOUNTER
Triage ,  Please ask home care to see her asap   Her swelling needs attention  We cannot go up on diuretics as kidney function is deteriorating  Patient agrees.  Dr.Nasima Vilma MD      ISpoke to patient   We cannot go up on diuretics as her kidney function is getting worse   I recommend that she should get treatment for her lower extremity edema which has lymphedema also.  Referred for home care lymphedema treatment

## 2018-11-30 NOTE — TELEPHONE ENCOUNTER
Reason for Call:  Other     Detailed comments: Pt's weight has dropped to 168 lbs.  Her weight is back down to normal, she is not going to do any wraps on her legs    Phone Number Patient can be reached at: Home number on file [phone] (home)    Best Time: any    Can we leave a detailed message on this number? YES    Call taken on 11/30/2018 at 11:12 AM by Nallely Meraz

## 2018-11-30 NOTE — TELEPHONE ENCOUNTER
Called Jeannine Thomson- transferred to Olivia ATKINS to provide verbal orders for skilled nursing as requested. Also provided verbal orders for OT as per requested in separate encounter.    Vincent ASTORGA RN   Called and spoke to patient regarding to make an appt with his PCP  During the call, it was hard to hear patient speak and phone was handed to Shaw Yip, who is listed on file as emergency contact  She explained patient was driving and could not talk and also stated she relayed message to patient about making an appt with PCP yesterday when I spoke to her on phone  Was also present during his appt  Yesterday  She started to raise voice and questioned why I was calling and that I explained physician wanted me to speak to him personally  She said that I shouldve called his phone and I explained both numbers were on file  She stated patient was going to make an appt with PCP and continued to raise voice, eventually hanging up phone call

## 2018-11-30 NOTE — TELEPHONE ENCOUNTER
Spoke with Olivia from [facility] and provided verbal orders for OT for lymphedma therapy.    Vincent ASTORGA RN

## 2018-11-30 NOTE — TELEPHONE ENCOUNTER
Reason for Call:  Other FYI     Detailed comments: Janet Thomas RN from [facility] called to let you know that pts Ortho Dr Fish [facility] has already placed all her HomeCare orders and they are already seeing pt. Dr Fish is following this.  Thank you.    Phone Number Patient can be reached at: Other phone number:  na    Best Time: na    Can we leave a detailed message on this number? Not Applicable    Call taken on 11/30/2018 at 2:01 PM by Georgina Elliott

## 2018-11-30 NOTE — TELEPHONE ENCOUNTER
Reason for Call:  HC CALLING TO SAY THEY WILL FAX ORDERS TO [facility] BECAUSE PT HAS OPEN ENCOUNTER FOR HOMECARE THERE          Best Time: ANYTIME    Can we leave a detailed message on this number? YES    Call taken on 11/30/2018 at 9:51 AM by Ching House

## 2018-11-30 NOTE — TELEPHONE ENCOUNTER
Reason for Call:  Home Health Care    Jeannine Thomson with  Homecare called regarding (reason for call):     Orders are needed for this patient.     PT:     OT:     Skilled Nursing: eval and treat    Pt Provider: Dr. Esparza    Phone Number Homecare Nurse can be reached at: [phone]    Can we leave a detailed message on this number? YES    Phone number patient can be reached at: Other phone number:      Best Time:    Call taken on 11/30/2018 at 9:15 AM by Loreto Holliday

## 2018-11-30 NOTE — TELEPHONE ENCOUNTER
Called Jeannine Thomson again, patient needs skilled nursing and PT as well and because patient has an open referral to [facility] the orders needed to faxed to them, not the PCP.  Home care orders were faxed to [facility].    CHRIS Stephens, RN  [employer] Triage

## 2018-12-19 PROBLEM — D50.9 ANEMIA, IRON DEFICIENCY: Status: ACTIVE | Noted: 2018-01-01

## 2018-12-19 PROBLEM — Z87.81 S/P LEFT HIP FRACTURE: Status: ACTIVE | Noted: 2018-01-01

## 2018-12-19 PROBLEM — I48.20 CHRONIC ATRIAL FIBRILLATION (H): Chronic | Status: ACTIVE | Noted: 2017-09-05

## 2018-12-19 PROBLEM — N18.4 CKD (CHRONIC KIDNEY DISEASE) STAGE 4, GFR 15-29 ML/MIN (H): Status: ACTIVE | Noted: 2018-01-01

## 2018-12-19 PROBLEM — I48.20 CHRONIC ATRIAL FIBRILLATION (H): Status: ACTIVE | Noted: 2017-09-05

## 2018-12-19 NOTE — PROGRESS NOTES
[address] [address]  [phone]  Dept: [phone]    PRE-OP EVALUATION:  Today's date: 2018    Helene Gibbs (: 1935) presents for pre-operative evaluation assessment as requested by Dr. Fish.  She requires evaluation and anesthesia risk assessment prior to undergoing surgery/procedure for treatment of left hip .    Proposed Surgery/ Procedure: INCISION AND DRAINAGE LEFT HIP  Date of Surgery/ Procedure: 2018  Time of Surgery/ Procedure: 1:40pm  Hospital/Surgical Facility:  OR  Fax number for surgical facility:   Primary Physician: Neisha Esparza  Type of Anesthesia Anticipated: General    Patient has a Health Care Directive or Living Will:  YES in Epic     1. YES - DO YOU HAVE A HISTORY OF HEART ATTACK, STROKE, STENT, BYPASS OR SURGERY ON AN ARTERY IN THE HEAD, NECK, HEART OR LEG? Heart attack  2. NO - Do you ever have any pain or discomfort in your chest?  3. YES - DO YOU HAVE A HISTORY OF HEART FAILURE CFR  She has history of cardiomyopathy  4. YES - ARE YOUR TROUBLED BY SHORTNESS OF BREATH WHEN WALKING ON THE LEVEL, UP A SLIGHT HILL OR AT NIGHT? All the above.  She has COPD and cardiomyopathy  5. NO - Do you currently have a cold, bronchitis or other respiratory infection?  6. NO - Do you have a cough, shortness of breath or wheezing?  7. NO - Do you sometimes get pains in the calves of your legs when you walk?  8. NO - Do you or anyone in your family have previous history of blood clots?  9. NO - Do you or does anyone in your family have a serious bleeding problem such as prolonged bleeding following surgeries or cuts?  10. YES - HAVE YOU EVER HAD PROBLEMS WITH ANEMIA OR BEEN TOLD TO TAKE IRON PILLS? HX of taking iron in past  She has a history of anemia and GI bleed in the past  11. NO - Have you had any abnormal blood loss such as black, tarry or bloody stools, or abnormal vaginal bleeding?  12. YES - HAVE YOU EVER HAD A BLOOD  TRANSFUSION? HX of unable to recall.   13. NO - Have you or any of your relatives ever had problems with anesthesia?  14. NO - Do you have sleep apnea, excessive snoring or daytime drowsiness?  15. NO - DO YOU HAVE ANY PROSTHETIC HEART VALVES?   16. YES - DO YOU HAVE PROSTHETIC JOINTS? Bilateral knees   17. NO - Is there any chance that you may be pregnant?      HPI:     shmed was late for her appointment due to transportation  Issue    HPI related to upcoming procedure:   She is a 83-year-old female patient with past medical history significant for  CKD, CHF, COPD, DM II, HTN, A-fib s/p AVN ablation and PPM  Came for preop evaluation  She had a fall and hip fracture in October of this year  She had left hip femoral fracture and left hip hemiarthroplasty on October 20, 2018  Her wound is is still not healed up completely  She is continued to have drainage  So she is going to have incision and drainage      See problem list for active medical problems.  Problems all longstanding and stable, except as noted/documented.  See ROS for pertinent symptoms related to these conditions.                                                                                                                                                          .    MEDICAL HISTORY:     Patient Active Problem List    Diagnosis Date Noted     CKD (chronic kidney disease) stage 4, GFR 15-29 ml/min (H) 12/19/2018     Priority: Medium     Leg fracture, left 10/20/2018     Priority: Medium     Generalized weakness 07/21/2018     Priority: Medium     (HFpEF) heart failure with preserved ejection fraction (H) 05/14/2018     Priority: Medium     Chest pressure 05/06/2018     Priority: Medium     NSTEMI (non-ST elevated myocardial infarction) (H) 05/06/2018     Priority: Medium     Pulmonary nodule 04/11/2018     Priority: Medium     Seen on CT scan dated April 6, 2018       Hematoma of groin 01/04/2018     Priority: Medium     Groin hematoma, initial encounter  01/04/2018     Priority: Medium     Controlled substance agreement signed 11/01/2017     Priority: Medium     Other chronic pain 11/01/2017     Priority: Medium                Herpes zoster without complication 10/04/2017     Priority: Medium     History of bone marrow biopsy 09/19/2017     Priority: Medium     Bone marrow was done on 8/2017 .  it is hypocellular with only 20% cellularity.  There is no evidence of a leukemia, lymphoma or MDS.  Patient has normochromic, normocytic anemia  anemia is due to multiple factors.  It is due to a combination of hypocellular marrow, chronic kidney disease and anemia due to chronic medical problems       Iron deficiency 09/14/2017     Priority: Medium     Adverse effect of iron 09/14/2017     Priority: Medium     Gastrointestinal hemorrhage, unspecified gastrointestinal hemorrhage type 09/11/2017     Priority: Medium     Tubular adenoma 10/10/2016     Priority: Medium     Basal cell carcinoma of skin 09/27/2016     Priority: Medium     Chronic pain syndrome 09/27/2016     Priority: Medium     Patient is followed by Neisha Esparza MD for ongoing prescription of pain medication.  All refills should be approved by this provider, or covering partner.    Medication(s): tramadol.   Maximum quantity per month: # 60  Clinic visit frequency required: Q 3 months     Controlled substance agreement:  Encounter-Level CSA - 8/28/14:               Controlled Substance Agreement - Scan on 8/29/2014  8:53 AM : CONTROLLED MEDICATION AGREEMENT  8/28/14 (below)            Pain Clinic evaluation in the past: No    DIRE Total Score(s):  No flowsheet data found.    Last [facility] website verification:  09/13/2018 [place]      [email or web address]               Gastroesophageal reflux disease without esophagitis 01/19/2016     Priority: Medium     Slow transit constipation 01/15/2016     Priority: Medium     Cholelithiasis with acute on chronic cholecystitis 12/14/2015     Priority: Medium      Nausea and vomiting 12/08/2015     Priority: Medium     Hyperlipidemia 01/13/2015     Priority: Medium     Problem list name updated by automated process. Provider to review       Obesity 09/30/2014     Priority: Medium     Thoracic disc herniation 09/10/2014     Priority: Medium     Hypertension goal BP (blood pressure) < 140/90 08/12/2014     Priority: Medium     Depression with anxiety 08/12/2014     Priority: Medium     Tobacco abuse: [age]y/o on 8-12-14 @ 1ppd=50 pk yr hx  08/12/2014     Priority: Medium     Stasis dermatitis 08/12/2014     Priority: Medium     Mild cognitive impairment SLUMS = 22/ 30  CPT = 5.0/ 5.5 7-2014 07/28/2014     Priority: Medium     Health Care Home 07/15/2014     Priority: Medium     State Tier Level:  unknown  Status:  [facility]  Carrol Gibbs RN  [facility] Care Coordinator  [phone]                             Intractable back pain 07/11/2014     Priority: Medium     Neck pain 06/11/2014     Priority: Medium     Cardiac pacemaker in situ 05/22/2014     Priority: Medium     CHF (congestive heart failure) (H) 05/09/2014     Priority: Medium     COPD (chronic obstructive pulmonary disease) (H) 04/28/2014     Priority: Medium     Elevated TSH 04/28/2014     Priority: Medium     Diplopia 04/12/2014     Priority: Medium     Melanosis of colon 01/29/2013     Priority: Medium     Pulmonary hypertension (H)      Priority: Medium     c diff colitis 11-12-12 11/13/2012     Priority: Medium     Adjustment reaction with anxiety and depression 11/01/2012     Priority: Medium     Pleural effusion, left 10/01/2012     Priority: Medium     Chronic low back pain 08/31/2012     Priority: Medium     CKD (chronic kidney disease) stage 3, GFR 30-59 ml/min (H) 08/10/2011     Priority: Medium     Anemia 06/07/2011     Priority: Medium     Type 2 diabetes mellitus with diabetic nephropathy (H) 12/07/2010     Priority: Medium     Permanent atrial fibrillation (H) 08/01/2008     Priority:  Medium     S/P AV node ablation and pacemaker 10-25-12          Past Medical History:   Diagnosis Date     Anemia      Ankle arthritis      Arthritis      Back pain      Bell's palsy 9/08    left     Breast CA (H) 2004-    left lumpectomy, radiation, -recurrence     CKD (chronic kidney disease)     stage 3 baseline Cr 1.3     Colon polyps     colonoscopy-9/12-next due in 9/13     Congestive heart failure (H)      COPD (chronic obstructive pulmonary disease) (H)      Coronary artery disease      DM (diabetes mellitus) (H)      GERD (gastroesophageal reflux disease)      Hyperlipidemia      Hypertension      Lumbar spinal stenosis     use cane     Nicotine dependence      Obesity      Osteoporosis      Other chronic pain      Pacemaker      Permanent atrial fibrillation (H) 8/2008    S/P AV node ablation and pacemaker 10-25-12       Pleural effusion      Pulmonary hypertension (H)      PVD (peripheral vascular disease) (H)      Rectal stenosis      Tobacco abuse     current     Tricuspid regurgitation      Past Surgical History:   Procedure Laterality Date     ARTHROPLASTY HIP Left 10/20/2018    Procedure: LEFT HIP HERACLIO-ARTHROPLASTY ;  Surgeon: Ish Fish MD;  Location:  OR     ARTHROSCOPY SHOULDER RT/LT  1999, 2004    Bilateral, right then left     BONE MARROW BIOPSY, BONE SPECIMEN, NEEDLE/TROCAR N/A 8/29/2017    Procedure: BIOPSY BONE MARROW;  BONE MARROW BIOPSY;  Surgeon: Marino Cohen MD;  Location:  GI     C DEXA, BONE DENSITY, AXIAL SKEL       C MAMMOGRAM, SCREENING  1/2009     COLONOSCOPY N/A 10/7/2016    Procedure: COMBINED COLONOSCOPY, SINGLE OR MULTIPLE BIOPSY/POLYPECTOMY BY BIOPSY;  Surgeon: Cassandra Mccord MD;  Location:  GI     ESOPHAGOSCOPY, GASTROSCOPY, DUODENOSCOPY (EGD), COMBINED N/A 8/10/2017    Procedure: COMBINED ESOPHAGOSCOPY, GASTROSCOPY, DUODENOSCOPY (EGD), BIOPSY SINGLE OR MULTIPLE;  gastroscopy;  Surgeon: Helene Bustamante MD;  Location:  GI     H ABLATION AV NODE   10/2012     HC COLONOSCOPY THRU STOMA, DIAGNOSTIC  ? 2005     IMPLANT PACEMAKER  10/2012    [place] [id number], [id number]     JOINT REPLACEMTN, KNEE RT/LT      Joint Replacement knee bilateral     LAPAROSCOPIC CHOLECYSTECTOMY WITH CHOLANGIOGRAMS N/A 12/14/2015    Procedure: LAPAROSCOPIC CHOLECYSTECTOMY WITH CHOLANGIOGRAMS;  Surgeon: Ervin Amos MD;  Location:  OR     LUMPECTOMY BREAST      Left-2004     PHACOEMULSIFICATION CLEAR CORNEA WITH STANDARD INTRAOCULAR LENS IMPLANT Left 7/16/2015    Procedure: PHACOEMULSIFICATION CLEAR CORNEA WITH STANDARD INTRAOCULAR LENS IMPLANT;  Surgeon: Sai Obregon MD;  Location: [facility]     PHACOEMULSIFICATION CLEAR CORNEA WITH TORIC INTRAOCULAR LENS IMPLANT Right 5/9/2017    Procedure: PHACOEMULSIFICATION CLEAR CORNEA WITH TORIC INTRAOCULAR LENS IMPLANT;  RIGHT EYE PHACOEMULSIFICATION CLEAR CORNEA WITH TORIC INTRAOCULAR LENS IMPLANT ;  Surgeon: Duc Richmond MD;  Location: [facility]     Current Outpatient Medications   Medication Sig Dispense Refill     ACCU-CHEK SMARTVIEW test strip USE 1 STRIP BY IN VITRO ROUTE 2 TIMES DAILY OR AS DIRECTED 200 strip 0     ACE/ARB/ARNI NOT PRESCRIBED (INTENTIONAL) Please choose reason not prescribed, below       Acetaminophen (TYLENOL PO) Take 650 mg by mouth 3 times daily       albuterol (PROAIR HFA, PROVENTIL HFA, VENTOLIN HFA) 108 (90 BASE) MCG/ACT inhaler Inhale 2 puffs into the lungs every 4 hours as needed for shortness of breath / dyspnea or wheezing 1 Inhaler 5     ASPIRIN PO Take 81 mg by mouth daily       bisacodyl (DULCOLAX) 5 MG EC tablet Take 10 mg by mouth every evening       blood glucose monitoring (NO BRAND SPECIFIED) meter device kit Accucheck Mariza meter; verified with pharmacist 1 kit 0     CLINDAMYCIN HCL PO Give 300 mg by mouth as needed for Dental Infection Prophylaxis Give 1 tablet (300mg) 4 times daily as needed. To be given the day prior to dental procedure       denosumab (PROLIA) 60 MG/ML SOLN injection  Inject 1 mL (60 mg) Subcutaneous every 6 months 1 mL 1     ELIQUIS 2.5 MG tablet        fluticasone (FLONASE) 50 MCG/ACT spray Spray 1-2 sprays into both nostrils daily as needed for rhinitis or allergies       GlipiZIDE (GLUCOTROL PO) Take 5 mg by mouth every morning (before breakfast)       hydrOXYzine (VISTARIL) 25 MG capsule Take 1 capsule (25 mg) by mouth 2 times daily as needed for itching 180 capsule 1     Lidocaine (LIDOCARE) 4 % Patch Place 3 patches onto the skin daily as needed for moderate pain       LYRICA 100 MG capsule        metoclopramide (REGLAN) 5 MG tablet Take 1 tablet (5 mg) by mouth 3 times daily as needed 20 tablet 0     metoprolol tartrate (LOPRESSOR) 25 MG tablet Take 1 tablet (25 mg) by mouth 2 times daily 180 tablet 3     nystatin (MYCOSTATIN) 825298 UNIT/ML suspension Swish and swallow 1 mL (100,000 Units) in mouth 4 times daily 280 mL      ondansetron (ZOFRAN ODT) 4 MG ODT tab Take 1 tablet (4 mg) by mouth every 6 hours as needed for nausea 30 tablet 1     oxyCODONE IR (ROXICODONE) 5 MG tablet Take 1 tablet (5 mg) by mouth every 4 hours as needed for moderate to severe pain 30 tablet 0     polyethylene glycol (MIRALAX/GLYCOLAX) Packet Take 17 g by mouth 2 times daily 7 packet      potassium chloride (KLOR-CON) 20 MEQ Packet Take 20 mEq by mouth daily       pregabalin (LYRICA) 75 MG capsule Take 1 capsule (75 mg) by mouth 2 times daily 60 capsule 3     ranitidine (ZANTAC) 150 MG tablet TAKE ONE TABLET BY MOUTH TWO TIMES A  tablet 3     senna-docusate (SENOKOT-S;PERICOLACE) 8.6-50 MG per tablet Take 2 tablets by mouth 2 times daily 40 tablet 0     simvastatin (ZOCOR) 10 MG tablet Take 1 tablet (10 mg) by mouth At Bedtime 90 tablet 3     tiotropium (SPIRIVA) 18 MCG capsule Inhale 1 capsule (18 mcg) into the lungs every evening 30 capsule 11     torsemide (DEMADEX) 20 MG tablet Take 1 tablet (20 mg) by mouth daily 30 tablet 1     OTC products: None, except as noted above and  "    Allergies   Allergen Reactions     Ambien [Zolpidem Tartrate] Visual Disturbance     Hallucinations and fell out of bed at night.     Penicillins Hives     Definity      Caused a syncopal episode.     Sulfa Drugs Itching     Cymbalta Rash     Fluconazole Rash      Latex Allergy: NO    Social History     Tobacco Use     Smoking status: Former Smoker     Packs/day: 0.25     Years: 45.00     Pack years: 11.25     Types: Cigarettes     Smokeless tobacco: Never Used   Substance Use Topics     Alcohol use: No     Alcohol/week: 0.0 oz     History   Drug Use No       REVIEW OF SYSTEMS:   Constitutional, neuro, ENT, endocrine, pulmonary, cardiac, gastrointestinal, genitourinary, musculoskeletal, integument and psychiatric systems are negative, except as otherwise noted.    EXAM:   /56 (BP Location: Right arm, Patient Position: Sitting, Cuff Size: Adult Regular)   Pulse 79   Temp 97.1  F (36.2  C) (Oral)   Ht 1.651 m (5' 5\")   Wt 82.1 kg (181 lb)   LMP  (LMP Unknown)   SpO2 96%   Breastfeeding? No   BMI 30.12 kg/m      GENERAL APPEARANCE: healthy, alert and no distress  She is in wheelchair not get to the examination table so examined while she was in wheelchair     EYES: EOMI, PERRL     HENT: ear canals and TM's normal and nose and mouth without ulcers or lesions     NECK: no adenopathy,      RESP: lungs clear to auscultation - no rales, rhonchi or wheezes     CV: regular rates and rhythm, normal S1 S2, no S3 positive systolic murmur     ABDOMEN:  soft, nontender, no HSM or masses and bowel sounds normal     MS: extremities : There is a scar of left hip arthroplasty and there is some drainage coming from the scar.     SKIN: no suspicious lesions or rashes      NEURO: She is able to move all her 4 extremities  But has arthritic changes     PSYCH: mentation appears normal. and affect normal/bright     LYMPHATICS: No cervical adenopathy    DIAGNOSTICS:     Last EKG showed wide QRS complex  Recent Labs   Lab " Test 11/19/18  1421 11/15/18  1203  11/01/18 10/26/18  0659 10/25/18  0657  10/19/18  2156  07/21/18  0800  01/05/18  0800   HGB  --   --   --  11.2* 10.4* 11.0*   < > 9.3*   < > 13.3   < > 11.8   PLT  --   --   --   --  203 172   < > 260   < > 223   < > 215   INR  --   --   --   --   --   --   --  1.54*  --   --   --  1.00    137   < > 138 139 138   < > 135   < > 133   < > 136   POTASSIUM 4.8 4.2   < > 5.1 5.3 4.8   < > 4.4   < > 3.6   < > 4.1   CR 2.21* 1.87*   < > 1.00 1.16* 1.69*   < > 1.80*   < > 1.19*   < > 1.11*   A1C  --   --   --   --   --   --   --  5.5  --  6.9*   < >  --     < > = values in this interval not displayed.        IMPRESSION:   Reason for surgery/procedure: Patient had left hip arthroplasty on October 20, 2019 she still has a wound which is draining and she needs incision and drainage of that wound  Diagnosis/reason for consult: Preop history and physical    The proposed surgical procedure is considered INTERMEDIATE risk.    REVISED CARDIAC RISK INDEX  The patient has the following serious cardiovascular risks for perioperative complications such as (MI, PE, VFib and 3  AV Block):  No serious cardiac risks  INTERPRETATION: 2 risks: Class III (moderate risk - 6.6% complication rate)    The patient has the following additional risks for perioperative complications:  No identified additional risks    Helene was seen today for pre-op exam.    Diagnoses and all orders for this visit:    Preop general physical exam  -     CBC with platelets and differential    Local infection of wound  Patient wound is not healing and draining despite daily wound care by nursing staff  She is followed by home health nurse  The wound continued to drain  So she is going to have incision and drainage    Hip pain, left    Chronic obstructive pulmonary disease, unspecified COPD type (H)  She is on his Spiriva and advised her to continue to use that    History of total left hip arthroplasty  He had hip replacement  due to hip fracture    Type 2 diabetes mellitus with diabetic nephropathy, without long-term current use of insulin (H)  -     Comprehensive metabolic panel  Lab Results   Component Value Date    A1C 5.5 10/19/2018    A1C 6.9 07/21/2018    A1C 6.8 03/22/2018    A1C 6.6 01/04/2018    A1C 6.7 08/10/2017     Well-controlled    Cardiomyopathy, unspecified type (H):  Currently well compensated     CKD (chronic kidney disease) stage 4, GFR 15-29 ml/min (H)  Monitoring that    Anemia, unspecified type  Hemoglobin is 6.6 it dropped compared to before  She is not having any abdominal pain or any bleeding from anywhere  She is on blood thinner aspirin and Eliquis  This results came after patient went home  Explained the results to the patient  See my result note  She is going to be admitted directly to the hospital from her home  Accepted by the hospitalist Dr.Mc Dewitt  She did not have anyone to transport her  She cannot use public transportation due to her recent hip fracture and she is wheelchair-bound  At this point I called the ambulance by calling 911 and gave them information  Patient will be transported to the eighth floor of [facility]  Patient is in agreement    Advised the patient not to take Eliquis or aspirin until workup is complete    This note was completed in part using Dragon voice recognition software.  Although reviewed after completion, some words and grammatical errors may occur.  The total visit time was 90 minutes more  than 50% was spent in counseling and coordination of care as discussed above.    RECOMMENDATIONS:     Will notify her surgeon to postpone the surgery as patient is admitted to the hospital  she would need blood transfusion as well as workup for anemia       Signed Electronically by: Neisha Esparza MD    Copy of this evaluation report is provided to requesting physician.    [facility] Preop Guidelines    Revised Cardiac Risk Index

## 2018-12-19 NOTE — PATIENT INSTRUCTIONS
Before Your Surgery      Call your surgeon if there is any change in your health. This includes signs of a cold or flu (such as a sore throat, runny nose, cough, rash or fever).    Do not smoke, drink alcohol or take over the counter medicine (unless your surgeon or primary care doctor tells you to) for the 24 hours before and after surgery.    If you take prescribed drugs: Follow your doctor s orders about which medicines to take and which to stop until after surgery.    Eating and drinking prior to surgery: follow the instructions from your surgeon    Take a shower or bath the night before surgery. Use the soap your surgeon gave you to gently clean your skin. If you do not have soap from your surgeon, use your regular soap. Do not shave or scrub the surgery site.  Wear clean pajamas and have clean sheets on your bed.       Hold aspirin and Eliquis until after surgery  Do not take torsemide on morning of surgery.  Hold glipizide on morning of surgery

## 2018-12-19 NOTE — LETTER
Transition Communication Hand-off for Care Transitions to Next Level of Care Provider    Name: Helene Gibbs : 1935  MRN #: [id number]  Primary Care Provider: Neisha Esparza     Primary Clinic: [address]  [place]                [place] [address]     Reason for Hospitalization:  Low Hemo  Anemia  Admit Date/Time: 2018  5:50 PM  Discharge Date: POSSIBLY 18 XMAS DAY  Payor Source: Payor: MEDICARE / Plan: MEDICARE / Product Type: Medicare /     Readmission Assessment Measure (DONIS) Risk Score/category: HIGH    Plan of Care Goals/Milestone Events:   Pt had a planned L hip I&D, and found anemia Hgb 6.6 on preop exam (transfused, refused colonoscopy).  I&D was done, had a temporary incisional wound vac, removed POD 3.  IV antibiotics were administered while final cultures were pending.      Reason for Communication Hand-off Referral: Other early handoff in case pt discharges  (no care coordinators in hospital xmas day)    Discharge Plan:  Plans to discharge to [facility] or [facility] pending bed availability on .  When she discharge's from TCU she hopes to go with [facility].      Concern for non-adherence with plan of care: No.    Discharge Needs Assessment:  Needs      Most Recent Value   Equipment Currently Used at Home  walker, rolling      Follow-up specialty is recommended: Yes, ortho    Key Recommendations:  Please be aware of this pt's potential dc to a rehab facility on .      Shawanda Hemphill RN  [facility] Care Coordinator    AVS/Discharge Summary is the source of truth; this is a helpful guide for improved communication of patient story

## 2018-12-19 NOTE — TELEPHONE ENCOUNTER
"Pt called in today at 1015 to report that she was going to be late for her 1030 appt with Dr. Esparza for a preop.  When asked how late she will be, she wouldn't answer. Told her of the 15 late policy.  Asked if we could r/s and she hung up on me.  Went and reported that pt will be late to Dr. Esparza.  Vilma has obligations today that she can not make exceptions for late pts.  Per Vilma, called pt back and offered to r/s  And she said, \"I paid for my cab and wheel chair, they will see me.  I need this done.\" and hung up the phone. Time was 1021 when she hung up.      R/S her with [facility] at 1400 today as that was the soonest opening.    "

## 2018-12-19 NOTE — RESULT ENCOUNTER NOTE
She was seen in the clinic today for preop  Her hemoglobin is 6.6  Result came after she went home  Informed the patient  She is not having any gross bleeding  Her hemoglobin has dropped from 11.2 to 6.6  It was 11.2 about a month ago  Denied any bleeding from anywhere  Denied any epigastric pain or any blood in the urine  She will need to be admitted to the hospital for workup and blood transfusion  I spoke to the hospitalist on call  They accepted the patient as a direct admit  She is in agreement  Spoke to the hospitalist Dr.Mc Dewitt

## 2018-12-20 NOTE — CONSULTS
[facility]  Gastroenterology Consultation    Helene Gibbs  [address] [facility] [address]  83 year old female    Admission Date/Time: 12/19/2018  Primary Care Provider: Neisha Esparza    We were asked to see the patient in consultation by Dr. Young for evaluation of heme + anemia.        HPI:  Helene Gibbs is a 83 year old female who has a past medical history of type 2 diabetes, COPD with pulmonary hypertension, stage 3 chronic renal failure who presents with anemia (hemoglobin 6.6) on pre-operative exam for left hip I&D after ORIF 11/20 with persistent wound drainage.    Patient with longstanding history of anemia with thorough workup in past.  Colonoscopy completed 10/2016 to the cecum revealed 2 small polyps.  Pathology consistent with one tubular adenoma and one hyperplastic polyp.  An upper endoscopy 8/2017 showed a normal esophagus, erythematous mucosa in the antrum, and a normal duodenum.  Biopsies showed chronic gastritis, negative H. Pylori, negative celiac.      Her iron studies are low with an iron level of 16, iron saturation of 4%.  Fecal occult blood testing is positive.    The patient denies abdominal pain, change in bowel movements, hematochezia, melena, nausea, vomiting, heartburn, indigestion, dysphagia, or odynophagia.  She has mild constipation at baseline.          ROS: A comprehensive ten point review of systems was negative aside from those in mentioned in the HPI.      MEDICATIONS:   No current facility-administered medications on file prior to encounter.   Current Outpatient Medications on File Prior to Encounter:  ASPIRIN PO Take 81 mg by mouth daily   bisacodyl (DULCOLAX) 5 MG EC tablet Take 5-10 mg by mouth daily as needed for constipation   ELIQUIS 2.5 MG tablet 2.5 mg 2 times daily    GlipiZIDE (GLUCOTROL PO) Take 5 mg by mouth every morning (before breakfast)   magnesium hydroxide (MILK OF MAGNESIA) 400 MG/5ML suspension Take by mouth daily  as needed for constipation or heartburn   potassium chloride (KLOR-CON) 20 MEQ Packet Take 20 mEq by mouth daily   pregabalin (LYRICA) 75 MG capsule Take 1 capsule (75 mg) by mouth 2 times daily   ranitidine (ZANTAC) 150 MG tablet TAKE ONE TABLET BY MOUTH TWO TIMES A DAY   simvastatin (ZOCOR) 10 MG tablet Take 1 tablet (10 mg) by mouth At Bedtime   tiotropium (SPIRIVA) 18 MCG capsule Inhale 1 capsule (18 mcg) into the lungs every evening   torsemide (DEMADEX) 20 MG tablet Take 1 tablet (20 mg) by mouth daily   ACCU-ShareYourCartK SMARTVIEW test strip USE 1 STRIP BY IN VITRO ROUTE 2 TIMES DAILY OR AS DIRECTED   ACE/ARB/ARNI NOT PRESCRIBED (INTENTIONAL) Please choose reason not prescribed, below   albuterol (PROAIR HFA, PROVENTIL HFA, VENTOLIN HFA) 108 (90 BASE) MCG/ACT inhaler Inhale 2 puffs into the lungs every 4 hours as needed for shortness of breath / dyspnea or wheezing   blood glucose monitoring (NO BRAND SPECIFIED) meter device kit AccuchUnocoin Mariza meter; verified with pharmacist   CLINDAMYCIN HCL PO Give 300 mg by mouth as needed for Dental Infection Prophylaxis Give 1 tablet (300mg) 4 times daily as needed. To be given the day prior to dental procedure   denosumab (PROLIA) 60 MG/ML SOLN injection Inject 1 mL (60 mg) Subcutaneous every 6 months   fluticasone (FLONASE) 50 MCG/ACT spray Spray 1-2 sprays into both nostrils daily as needed for rhinitis or allergies   hydrOXYzine (VISTARIL) 25 MG capsule Take 1 capsule (25 mg) by mouth 2 times daily as needed for itching   Lidocaine (LIDOCARE) 4 % Patch Place 3 patches onto the skin daily as needed for moderate pain   metoprolol tartrate (LOPRESSOR) 25 MG tablet Take 1 tablet (25 mg) by mouth 2 times daily   ondansetron (ZOFRAN ODT) 4 MG ODT tab Take 1 tablet (4 mg) by mouth every 6 hours as needed for nausea       ALLERGIES:   Allergies   Allergen Reactions     Ambien [Zolpidem Tartrate] Visual Disturbance     Hallucinations and fell out of bed at night.     Penicillins  Hives     Definity      Caused a syncopal episode.     Sulfa Drugs Itching     Cymbalta Rash     Fluconazole Rash       Past Medical History:   Diagnosis Date     Anemia      Ankle arthritis      Arthritis      Back pain      Bell's palsy 9/08    left     Breast CA (H) 2004-    left lumpectomy, radiation, -recurrence     Breast cancer (H) 2004    Left breast lumpectomy w LN disection, and radiation therapy     CKD (chronic kidney disease)     stage 3 baseline Cr 1.3     Colon polyps     colonoscopy-9/12-next due in 9/13     Congestive heart failure (H)      COPD (chronic obstructive pulmonary disease) (H)      Coronary artery disease      DM (diabetes mellitus) (H)      GERD (gastroesophageal reflux disease)      Hyperlipidemia      Hypertension      Lumbar spinal stenosis     use cane     Nicotine dependence      Obesity      Osteoporosis      Other chronic pain      Pacemaker      Permanent atrial fibrillation (H) 8/2008    S/P AV node ablation and pacemaker 10-25-12       Pleural effusion      Pulmonary hypertension (H)      PVD (peripheral vascular disease) (H)      Rectal stenosis      Tobacco abuse     current     Tricuspid regurgitation        Past Surgical History:   Procedure Laterality Date     ARTHROPLASTY HIP Left 10/20/2018    Procedure: LEFT HIP HERACLIO-ARTHROPLASTY ;  Surgeon: sIh Fish MD;  Location:  OR     ARTHROSCOPY SHOULDER RT/LT  1999, 2004    Bilateral, right then left     BONE MARROW BIOPSY, BONE SPECIMEN, NEEDLE/TROCAR N/A 8/29/2017    Procedure: BIOPSY BONE MARROW;  BONE MARROW BIOPSY;  Surgeon: Marino Cohen MD;  Location:  GI     C DEXA, BONE DENSITY, AXIAL SKEL       C MAMMOGRAM, SCREENING  1/2009     COLONOSCOPY N/A 10/7/2016    Procedure: COMBINED COLONOSCOPY, SINGLE OR MULTIPLE BIOPSY/POLYPECTOMY BY BIOPSY;  Surgeon: Cassandra Mccord MD;  Location:  GI     ESOPHAGOSCOPY, GASTROSCOPY, DUODENOSCOPY (EGD), COMBINED N/A 8/10/2017    Procedure: COMBINED ESOPHAGOSCOPY,  GASTROSCOPY, DUODENOSCOPY (EGD), BIOPSY SINGLE OR MULTIPLE;  gastroscopy;  Surgeon: Helene Bustamante MD;  Location:  GI     H ABLATION AV NODE  10/2012     HC COLONOSCOPY THRU STOMA, DIAGNOSTIC  ? 2005     IMPLANT PACEMAKER  10/2012    [place] [id number], [id number]     JOINT REPLACEMTN, KNEE RT/LT      Joint Replacement knee bilateral     LAPAROSCOPIC CHOLECYSTECTOMY WITH CHOLANGIOGRAMS N/A 2015    Procedure: LAPAROSCOPIC CHOLECYSTECTOMY WITH CHOLANGIOGRAMS;  Surgeon: Ervin Amos MD;  Location:  OR     LUMPECTOMY BREAST      Left-     PHACOEMULSIFICATION CLEAR CORNEA WITH STANDARD INTRAOCULAR LENS IMPLANT Left 2015    Procedure: PHACOEMULSIFICATION CLEAR CORNEA WITH STANDARD INTRAOCULAR LENS IMPLANT;  Surgeon: Sai Obregon MD;  Location: [facility]     PHACOEMULSIFICATION CLEAR CORNEA WITH TORIC INTRAOCULAR LENS IMPLANT Right 2017    Procedure: PHACOEMULSIFICATION CLEAR CORNEA WITH TORIC INTRAOCULAR LENS IMPLANT;  RIGHT EYE PHACOEMULSIFICATION CLEAR CORNEA WITH TORIC INTRAOCULAR LENS IMPLANT ;  Surgeon: Duc Richmond MD;  Location: [facility]         SOCIAL HISTORY:  Social History     Tobacco Use     Smoking status: Former Smoker     Packs/day: 0.25     Years: 45.00     Pack years: 11.25     Types: Cigarettes     Last attempt to quit: 2018     Years since quittin.5     Smokeless tobacco: Never Used   Substance Use Topics     Alcohol use: No     Alcohol/week: 0.0 oz     Drug use: No       FAMILY HISTORY:  Family History   Problem Relation Age of Onset     Hypertension Mother      Diabetes Mother          at 83 yrs     C.A.D. Father          age 70s     Breast Cancer No family hx of      Colon Cancer No family hx of        PHYSICAL EXAM:   /45   Pulse 70   Temp 98.6  F (37  C) (Axillary)   Resp 18   LMP  (LMP Unknown)   SpO2 93%     Constitutional: NAD, comfortable  Cardiovascular: RRR, normal S1 and S2, no r/c/g/m  Respiratory: CTAB  Psychiatric:  mentation appears normal and affect normal  Head: Normocephalic. Atraumatic.    Neck: Neck supple. No adenopathy. Thyroid symmetric, normal size, trachea midline  Eyes:  PERRL, no icterus  ENT: Hearing adequate, pharynx normal without erythema or exudate  Abdomen:   Auscultation: + BS  Appearance: non-distended  Palpation: non-tender  NEURO: grossly negative  SKIN: no suspicious lesions or rashes  LYMPH:   anterior cervical: no adenopathy  posterior cervical: no adenopathy  supraclavicular: no adenopathy          ADDITIONAL COMMENTS:   I reviewed the patient's new clinical lab test results.   Recent Labs   Lab Test 12/20/18  0726 12/19/18  1133 11/01/18 10/26/18  0659 10/25/18  0657  10/22/18  0711  10/19/18  2156  01/05/18  0800 01/04/18  1101   WBC  --  5.0  --   --  9.1  --  10.5   < > 9.7   < > 7.0 6.5   HGB 7.8* 6.6* 11.2* 10.4* 11.0*   < > 8.9*   < > 9.3*   < > 11.8 12.6   MCV  --  86  --   --  91  --  91   < > 93   < > 93 92   PLT  --  296  --  203 172   < > 195   < > 260   < > 215 223   INR  --   --   --   --   --   --   --   --  1.54*  --  1.00 0.97    < > = values in this interval not displayed.     Recent Labs   Lab Test 12/19/18 2053 11/19/18  1421 11/15/18  1203    139 137   POTASSIUM 4.1 4.8 4.2   CHLORIDE 104 101 100   CO2 29 33* 28   BUN 35* 47* 43*   CR 1.76* 2.21* 1.87*   ANIONGAP 8 5 9   GERARD 9.0 8.7 9.2   * 134* 93     Recent Labs   Lab Test 12/19/18  2230 12/19/18  2053 11/05/18 11/01/18  10/23/18  0722 10/22/18  2300  10/19/18  2300  07/18/18  1200  07/16/18  1718  05/22/18  1639  03/29/16  1301  12/09/15  0714   ALBUMIN  --  2.8* 2.8* 2.7*   < > 2.4*  --    < >  --    < > 3.5  --  3.5  --  4.3   < > 3.5   < > 3.2*   BILITOTAL  --  0.6 0.5 0.6   < > 0.8  --    < >  --    < > 1.0  --  1.1  --  0.7   < > 0.6   < > 0.8   DBIL  --   --   --   --   --  0.4*  --   --   --   --   --   --   --   --   --   --  0.2  --  0.5*   ALT  --  10 21 33   < > 229*  --    < >  --    < > 17  --  18  --   24   < > 24   < > 156*   AST  --  6 17 20   < > 110*  --    < >  --    < > 13  --  14  --  27   < > 10   < > 54*   ALKPHOS  --  82 84 78   < > 105  --    < >  --    < > 79  --  78  --  82   < > 96   < > 141   PROTEIN 30*  --   --   --   --   --  30*  --  10*   < >  --    < >  --    < >  --    < >  --    < >  --    LIPASE  --   --   --   --   --   --   --   --   --   --  78  --  62*  --  120   < >  --   --   --     < > = values in this interval not displayed.             .    CONSULTATION ASSESSMENT AND PLAN:      84 yo female admitted with acute on chronic iron deficiency anemia.  Patient with heme + stools but no evidence of yayo bleeding.  Colonoscopy last completed in 2016 to the cecum positive for one adenomatous and one hyperplastic polyp.  Upper endoscopy in 8/2017 did not show any abnormalities to account for blood loss.  Patient denies ASA, NSAID use but is on Eliquis for atrial fibrillation.  The patient reports that she does not wish to have a repeat colonoscopy procedure as the last one was quite painful.  A capsule endoscopy could be considered but is unclear if the patient would be willing to pursue further treatment if an abnormality was found.    -  Monitor H/H; transfuse prn.  -  Agree with iron repletion.  -  Patient currently refusing repeat colonoscopy; may consider capsule endoscopy but would have to clarify patient's willingness to pursue any findings from that procedure.      I discussed the patient's findings and plan with Dr. Hayden.          Tara Vang, [facility] Gastroenter[facility]  Office:  [phone] call if needed after 5PM  Cell:  [phone], not available after 5PM at this number

## 2018-12-20 NOTE — PHARMACY-ADMISSION MEDICATION HISTORY
Admission medication history interview status for the 12/19/2018  admission is complete. See EPIC admission navigator for prior to admission medications     Medication history source reliability:Moderate    Actions taken by pharmacist (provider contacted, etc):None     Additional medication history information not noted on PTA med list :sometimes skips torsemide because does not want to pee if cant get to bathroom.  May be contributing to renal function.  Not taking any narcotics, not taking any scheduled bowel meds     Medication reconciliation/reorder completed by provider prior to medication history? Yes    Time spent in this activity: 20min     Prior to Admission medications    Medication Sig Last Dose Taking? Auth Provider   ASPIRIN PO Take 81 mg by mouth daily 12/19/2018 at on hold Yes Reported, Patient   bisacodyl (DULCOLAX) 5 MG EC tablet Take 5-10 mg by mouth daily as needed for constipation prn Yes Unknown, Entered By History   ELIQUIS 2.5 MG tablet 2.5 mg 2 times daily  12/19/2018 at Unknown time Yes Reported, Patient   GlipiZIDE (GLUCOTROL PO) Take 5 mg by mouth every morning (before breakfast) 12/19/2018 at am Yes Reported, Patient   magnesium hydroxide (MILK OF MAGNESIA) 400 MG/5ML suspension Take by mouth daily as needed for constipation or heartburn Past Week at Unknown time Yes Unknown, Entered By History   potassium chloride (KLOR-CON) 20 MEQ Packet Take 20 mEq by mouth daily 12/19/2018 at Unknown time Yes Reported, Patient   pregabalin (LYRICA) 75 MG capsule Take 1 capsule (75 mg) by mouth 2 times daily 12/19/2018 at [id number] Yes Neisha Esparza MD   ranitidine (ZANTAC) 150 MG tablet TAKE ONE TABLET BY MOUTH TWO TIMES A DAY 12/19/2018 at [id number] Yes Neisha Esparza MD   simvastatin (ZOCOR) 10 MG tablet Take 1 tablet (10 mg) by mouth At Bedtime 12/19/2018 at 1600 Yes Neisha Esparza MD   tiotropium (SPIRIVA) 18 MCG capsule Inhale 1 capsule (18 mcg) into the lungs every evening 12/19/2018 at  1600 Yes Neisha Esparza MD   torsemide (DEMADEX) 20 MG tablet Take 1 tablet (20 mg) by mouth daily 12/18/2018 at Unknown time Yes Neisha Esparza MD   ACCU-CHEK SMARTVIEW test strip USE 1 STRIP BY IN VITRO ROUTE 2 TIMES DAILY OR AS DIRECTED   Neisha Esparza MD   ACE/ARB/ARNI NOT PRESCRIBED (INTENTIONAL) Please choose reason not prescribed, below   Neisha Esparza MD   albuterol (PROAIR HFA, PROVENTIL HFA, VENTOLIN HFA) 108 (90 BASE) MCG/ACT inhaler Inhale 2 puffs into the lungs every 4 hours as needed for shortness of breath / dyspnea or wheezing prn at not using  Neisha Esparza MD   blood glucose monitoring (NO BRAND SPECIFIED) meter device kit Accucheck Mariza meter; verified with pharmacist   Neisha Esparza MD   CLINDAMYCIN HCL PO Give 300 mg by mouth as needed for Dental Infection Prophylaxis Give 1 tablet (300mg) 4 times daily as needed. To be given the day prior to dental procedure prn  Reported, Patient   denosumab (PROLIA) 60 MG/ML SOLN injection Inject 1 mL (60 mg) Subcutaneous every 6 months due  Hector Moran MD   fluticasone (FLONASE) 50 MCG/ACT spray Spray 1-2 sprays into both nostrils daily as needed for rhinitis or allergies prn  Unknown, Entered By History   hydrOXYzine (VISTARIL) 25 MG capsule Take 1 capsule (25 mg) by mouth 2 times daily as needed for itching prn  Neisha Esparza MD   Lidocaine (LIDOCARE) 4 % Patch Place 3 patches onto the skin daily as needed for moderate pain   Unknown, Entered By History   metoprolol tartrate (LOPRESSOR) 25 MG tablet Take 1 tablet (25 mg) by mouth 2 times daily [id number]  Neisha Esparza MD   ondansetron (ZOFRAN ODT) 4 MG ODT tab Take 1 tablet (4 mg) by mouth every 6 hours as needed for nausea prn  Neisha Esparza MD

## 2018-12-20 NOTE — PROVIDER NOTIFICATION
MD Notification    Notified Person: MD    Notified Person Name: Karsten    Notification Date/Time: 12/19/18 2200    Notification Interaction: Paged    Purpose of Notification: Blood transfusion order/ Consent.    Orders Received: Provider called back and will send PA to do consent for blood.     Comments: Provider did not hear back from PA. MD did discuss plan of care that included blood transfusion. MD did give verbal order over telephone with read back to start blood and iron transfusion.  Provider also gave this RN option to call on Call MD for 88 to obtain consent. Will discuss with next nurse.

## 2018-12-20 NOTE — PLAN OF CARE
A/o x4. VSS. RA.Regular diet. IV SL.Good intake. Up SBA to bathroom with walker and gait belt. Need UA sample hat in bathroom.  Hip dressing CDI. CMS intact. Mild edema in BLE, elevated on pillows.Plan for Blood transfusion tonight and complete I/D of Left Hip incision site.   -UA collected and sent

## 2018-12-20 NOTE — H&P
[facility]    History and Physical  Hospitalist       Date of Admission:  12/19/2018    Assessment & Plan   Helene Gibbs is a 83 year old female with DM type 2, COPD with pulm htn, CRF stage 3 who presents with anemia on preop exam today -- hgb 6.6 and preop for left hip I & D after Left hip fracture with ORIF 11/20/18 and persistent wound drainage:    Summary:    Principal Problem:    Anemia  Active Problems:    Type 2 diabetes mellitus with diabetic nephropathy (H)    CKD (chronic kidney disease) stage 3, GFR 30-59 ml/min (H)    Pulmonary hypertension (H)    COPD (chronic obstructive pulmonary disease) (H)    Chronic atrial fib, S/P ablation -- on Eliquis    S/p left hip fracture 11/20/18 -- persistent drainage       Plan: Admit, will check iron and TIBC and % sat, and Vit B12, and hgb in AM.  Will get type and cross. Consult to [facility] -- was scheduled to have left hip I & D this Friday.     Addendum -- severe iron deficiency, will transfuse one unit PRBC, give IV Venofer, check hgb in AM --  Consider GI workup if blood in stool (but workup one year ago negative)    DVT Prophylaxis: Pneumatic Compression Devices  Code Status: DNR / DNI    Disposition: Expected discharge in 2-3 days once anemia corrected and has left hip surgery.    Soy Sanders MD  Pager: [phone]  Cell Phone:  [phone]     Primary Care Physician   Neisha Esparza    Chief Complaint   Anemia    History is obtained from Patient    History of Present Illness   83 year old female with DM type 2, COPD with pulm htn, CRF stage 3 who presents with anemia on preop exam today -- hgb 6.6 and preop for left hip I & D after Left hip fracture with ORIF 11/20/18 and persistent wound drainage -- to have I & D with Dr. Ish Fish ([facility]) on Friday, 12/21.   Last hgb was 11.2 on 11/1/18.  She denies blood in stool or melena.  She did have prior anemia for which she had a Bone Marrow Bx in 8/29/17 -- which was normochromic,  normocytic, 20% cellularity.  Aug 2017 she had serum iron 22, TIBC 400, and 6% sat, and Vit B12 1076, and EGD with slight gastric erythema, and colonoscopy 10/2017 with two polyps <= 3 mm, and was treated with IV iron therapy and last Ferritin was increased from 9 up to 190 in April 2018.       She does report feeling tired.  Also has chronic constipation for which she uses laxatives daily -- last BM today was loose and brown.  Does have GERD and takes Zantac bid.     Past Medical History    I have reviewed this patient's medical history and updated it with pertinent information if needed.   Past Medical History:   Diagnosis Date     Anemia      Ankle arthritis      Arthritis      Back pain      Bell's palsy 9/08    left     Breast CA (H) 2004-    left lumpectomy, radiation, -recurrence     Breast cancer (H) 2004    Left breast lumpectomy w LN disection, and radiation therapy     CKD (chronic kidney disease)     stage 3 baseline Cr 1.3     Colon polyps     colonoscopy-9/12-next due in 9/13     Congestive heart failure (H)      COPD (chronic obstructive pulmonary disease) (H)      Coronary artery disease      DM (diabetes mellitus) (H)      GERD (gastroesophageal reflux disease)      Hyperlipidemia      Hypertension      Lumbar spinal stenosis     use cane     Nicotine dependence      Obesity      Osteoporosis      Other chronic pain      Pacemaker      Permanent atrial fibrillation (H) 8/2008    S/P AV node ablation and pacemaker 10-25-12       Pleural effusion      Pulmonary hypertension (H)      PVD (peripheral vascular disease) (H)      Rectal stenosis      Tobacco abuse     current     Tricuspid regurgitation        Past Surgical History   I have reviewed this patient's surgical history and updated it with pertinent information if needed.  Past Surgical History:   Procedure Laterality Date     ARTHROPLASTY HIP Left 10/20/2018    Procedure: LEFT HIP HERACLIO-ARTHROPLASTY ;  Surgeon: Ish Fish MD;  Location:  OR      ARTHROSCOPY SHOULDER RT/LT  1999, 2004    Bilateral, right then left     BONE MARROW BIOPSY, BONE SPECIMEN, NEEDLE/TROCAR N/A 8/29/2017    Procedure: BIOPSY BONE MARROW;  BONE MARROW BIOPSY;  Surgeon: Marino Cohen MD;  Location:  GI     C DEXA, BONE DENSITY, AXIAL SKEL       C MAMMOGRAM, SCREENING  1/2009     COLONOSCOPY N/A 10/7/2016    Procedure: COMBINED COLONOSCOPY, SINGLE OR MULTIPLE BIOPSY/POLYPECTOMY BY BIOPSY;  Surgeon: Cassandra Mccord MD;  Location:  GI     ESOPHAGOSCOPY, GASTROSCOPY, DUODENOSCOPY (EGD), COMBINED N/A 8/10/2017    Procedure: COMBINED ESOPHAGOSCOPY, GASTROSCOPY, DUODENOSCOPY (EGD), BIOPSY SINGLE OR MULTIPLE;  gastroscopy;  Surgeon: Helene Bustamante MD;  Location:  GI     H ABLATION AV NODE  10/2012     HC COLONOSCOPY THRU STOMA, DIAGNOSTIC  ? 2005     IMPLANT PACEMAKER  10/2012    [place] [id number], [id number]     JOINT REPLACEMTN, KNEE RT/LT      Joint Replacement knee bilateral     LAPAROSCOPIC CHOLECYSTECTOMY WITH CHOLANGIOGRAMS N/A 12/14/2015    Procedure: LAPAROSCOPIC CHOLECYSTECTOMY WITH CHOLANGIOGRAMS;  Surgeon: Ervin Amos MD;  Location:  OR     LUMPECTOMY BREAST      Left-2004     PHACOEMULSIFICATION CLEAR CORNEA WITH STANDARD INTRAOCULAR LENS IMPLANT Left 7/16/2015    Procedure: PHACOEMULSIFICATION CLEAR CORNEA WITH STANDARD INTRAOCULAR LENS IMPLANT;  Surgeon: Sai Obregon MD;  Location:  EC     PHACOEMULSIFICATION CLEAR CORNEA WITH TORIC INTRAOCULAR LENS IMPLANT Right 5/9/2017    Procedure: PHACOEMULSIFICATION CLEAR CORNEA WITH TORIC INTRAOCULAR LENS IMPLANT;  RIGHT EYE PHACOEMULSIFICATION CLEAR CORNEA WITH TORIC INTRAOCULAR LENS IMPLANT ;  Surgeon: Duc Richmond MD;  Location:  EC       Prior to Admission Medications   Prior to Admission Medications   Prescriptions Last Dose Informant Patient Reported? Taking?   ACCU-CHEK SMARTVIEW test strip   No No   Sig: USE 1 STRIP BY IN VITRO ROUTE 2 TIMES DAILY OR AS DIRECTED    ACE/ARB/ARNI NOT PRESCRIBED (INTENTIONAL)   Yes No   Sig: Please choose reason not prescribed, below   ASPIRIN PO   Yes No   Sig: Take 81 mg by mouth daily   Acetaminophen (TYLENOL PO)   Yes No   Sig: Take 650 mg by mouth 3 times daily   CLINDAMYCIN HCL PO   Yes No   Sig: Give 300 mg by mouth as needed for Dental Infection Prophylaxis Give 1 tablet (300mg) 4 times daily as needed. To be given the day prior to dental procedure   ELIQUIS 2.5 MG tablet   Yes No   GlipiZIDE (GLUCOTROL PO)  Self Yes No   Sig: Take 5 mg by mouth every morning (before breakfast)   LYRICA 100 MG capsule   Yes No   Lidocaine (LIDOCARE) 4 % Patch  Self Yes No   Sig: Place 3 patches onto the skin daily as needed for moderate pain   albuterol (PROAIR HFA, PROVENTIL HFA, VENTOLIN HFA) 108 (90 BASE) MCG/ACT inhaler  Self No No   Sig: Inhale 2 puffs into the lungs every 4 hours as needed for shortness of breath / dyspnea or wheezing   bisacodyl (DULCOLAX) 5 MG EC tablet   Yes No   Sig: Take 10 mg by mouth every evening   blood glucose monitoring (NO BRAND SPECIFIED) meter device kit   No No   Sig: Accucheck Mariza meter; verified with pharmacist   denosumab (PROLIA) 60 MG/ML SOLN injection   No No   Sig: Inject 1 mL (60 mg) Subcutaneous every 6 months   fluticasone (FLONASE) 50 MCG/ACT spray  Self Yes No   Sig: Spray 1-2 sprays into both nostrils daily as needed for rhinitis or allergies   hydrOXYzine (VISTARIL) 25 MG capsule  Self No No   Sig: Take 1 capsule (25 mg) by mouth 2 times daily as needed for itching   metoclopramide (REGLAN) 5 MG tablet  Self No No   Sig: Take 1 tablet (5 mg) by mouth 3 times daily as needed   metoprolol tartrate (LOPRESSOR) 25 MG tablet  Self No No   Sig: Take 1 tablet (25 mg) by mouth 2 times daily   nystatin (MYCOSTATIN) 954647 UNIT/ML suspension   No No   Sig: Swish and swallow 1 mL (100,000 Units) in mouth 4 times daily   ondansetron (ZOFRAN ODT) 4 MG ODT tab  Self No No   Sig: Take 1 tablet (4 mg) by mouth every  6 hours as needed for nausea   oxyCODONE IR (ROXICODONE) 5 MG tablet   No No   Sig: Take 1 tablet (5 mg) by mouth every 4 hours as needed for moderate to severe pain   polyethylene glycol (MIRALAX/GLYCOLAX) Packet   No No   Sig: Take 17 g by mouth 2 times daily   potassium chloride (KLOR-CON) 20 MEQ Packet   Yes No   Sig: Take 20 mEq by mouth daily   pregabalin (LYRICA) 75 MG capsule  Self No No   Sig: Take 1 capsule (75 mg) by mouth 2 times daily   ranitidine (ZANTAC) 150 MG tablet  Self No No   Sig: TAKE ONE TABLET BY MOUTH TWO TIMES A DAY   senna-docusate (SENOKOT-S;PERICOLACE) 8.6-50 MG per tablet   No No   Sig: Take 2 tablets by mouth 2 times daily   simvastatin (ZOCOR) 10 MG tablet   No No   Sig: Take 1 tablet (10 mg) by mouth At Bedtime   tiotropium (SPIRIVA) 18 MCG capsule  Self No No   Sig: Inhale 1 capsule (18 mcg) into the lungs every evening   torsemide (DEMADEX) 20 MG tablet   No No   Sig: Take 1 tablet (20 mg) by mouth daily      Facility-Administered Medications: None     Allergies   Allergies   Allergen Reactions     Ambien [Zolpidem Tartrate] Visual Disturbance     Hallucinations and fell out of bed at night.     Penicillins Hives     Definity      Caused a syncopal episode.     Sulfa Drugs Itching     Cymbalta Rash     Fluconazole Rash       Social History   I have reviewed this patient's social history and updated it with pertinent information if needed. Helene Gibbs  reports that she quit smoking about 6 months ago. Her smoking use included cigarettes. She has a 11.25 pack-year smoking history. she has never used smokeless tobacco. She reports that she does not drink alcohol or use drugs.    Family History   I have reviewed this patient's family history and updated it with pertinent information if needed.   Family History   Problem Relation Age of Onset     Hypertension Mother      Diabetes Mother          at 83 yrs     C.A.D. Father          age 70s     Breast Cancer No family hx of       Colon Cancer No family hx of        Review of Systems   The 10 point Review of Systems is negative other than noted in the HPI or here. Does have chronic constipation.    # Pain Assessment:  Current Pain Score 10/26/2018   Patient currently in pain? -   Pain score (0-10) 6   Pain location -   Pain descriptors -   - Helene is experiencing pain due to left hip. Pain management was discussed and the plan was created in a collaborative fashion.  Helene's response to the current recommendations: engaged  - see orders    Physical Exam   Temp: 95.8  F (35.4  C) Temp src: Oral BP: 130/60 Pulse: 70   Resp: 16 SpO2: 95 % O2 Device: None (Room air)    Vital Signs with Ranges  Temp:  [95.8  F (35.4  C)-97.1  F (36.2  C)] 95.8  F (35.4  C)  Pulse:  [70-79] 70  Resp:  [16] 16  BP: (104-130)/(56-60) 130/60  SpO2:  [95 %-96 %] 95 %  0 lbs 0 oz    Constitutional: Awake, alert, cooperative, no apparent distress.  Eyes: Conjunctiva and pupils examined and normal.  HEENT: Moist mucous membranes, normal dentition.  Respiratory: Clear to auscultation bilaterally, no crackles or wheezing.  Cardiovascular: Regular rate and rhythm, normal S1 and S2, and 1/6 systolic murmur at left sternal border.    No carotid bruits.  Trace bilateral ankle edema.   GI: Soft, non-distended, non-tender, normal bowel sounds.  Lymph/Hematologic: No anterior cervical, supraclavicular or axillary adenopathy.  Skin: No rashes, no cyanosis.  Left hip wound with slight drainage (covered with dressing)  Musculoskeletal: No joint swelling, erythema or tenderness.  Neurologic: Cranial nerves 2-12 intact, no focal weakness or numbness  Psychiatric: Alert, Ox3, no obvious anxiety or depression.    Data   Data reviewed today:  I personally reviewed no images or EKG's today.  Recent Labs   Lab 12/19/18  1133   WBC 5.0   HGB 6.6*   MCV 86          Imaging:  No results found for this or any previous visit (from the past 24 hour(s)).

## 2018-12-20 NOTE — TELEPHONE ENCOUNTER
[employer] reply received, pt is 100% covered between medicare and coinsurance for this visit per [facility] Summary of benefits.  Cost to pt should be $0 if done in 2018.  Please schedule pt.  Brandi Hinojosa RN     Continue Regimen: Desonide/ketoconazole cream compound once a day as needed Detail Level: Generalized

## 2018-12-20 NOTE — TELEPHONE ENCOUNTER
Patient is scheduled for hip surgery tomorrow 12/21/18.    Patient had pre-op 12/19/18.  Her Hgb returned 6.6.  Patient was transported to [facility] for blood transfusion. Currently in in-patient.    Notified Albina at 's office [facility]:  [phone] per 's request:    Albina will notify . Is aware that patient is currently IN-Patient.     Keila Alcocer RN

## 2018-12-20 NOTE — PROGRESS NOTES
MD paged at 0030. Need consent for blood transfusion and clarification on iron order. Awaiting call back.   0130: Spoke with Dr Jeong. MD up to obtain consent. MD also stated that Iron to be started in the morning as scheduled (0900).

## 2018-12-20 NOTE — CONSULTS
[facility] Orthopedic Consultation    Helene Gibbs MRN# [id number]   Age: 83 year old YOB: 1935     Date of Admission:  12/19/2018    Reason for consult:  Preop for left hip irrigation and debridement       Requesting physician: Dr Shelley       Level of consult: Consult, follow and place orders           Assessment and Plan:   Assessment:   Draining left hip status post left hip hemiarthroplasty      Plan:   N.p.o. after midnight  Preop optimization per hospitalist service  May have normal diet until midnight  Dressing changes as needed           Chief Complaint:   Left hip draining wound         History of Present Illness:   This patient is a 83 year old female who presents with the following condition requiring a hospital admission:    Ms. Gibbs presented to [facility] for direct admission for preop of left hip irrigation and debridement.  She underwent a left hip hemiarthroplasty due to femoral neck fracture on 11/20/18 and has had persistent wound drainage.  At her preop visit her hemoglobin was noted to be 6.6 so she was directly admitted for further optimization.  She is on chronic anticoagulation for Eliquis, this has been discontinued          Past Medical History:     Past Medical History:   Diagnosis Date     Anemia      Ankle arthritis      Arthritis      Back pain      Bell's palsy 9/08    left     Breast CA (H) 2004-    left lumpectomy, radiation, -recurrence     Breast cancer (H) 2004    Left breast lumpectomy w LN disection, and radiation therapy     CKD (chronic kidney disease)     stage 3 baseline Cr 1.3     Colon polyps     colonoscopy-9/12-next due in 9/13     Congestive heart failure (H)      COPD (chronic obstructive pulmonary disease) (H)      Coronary artery disease      DM (diabetes mellitus) (H)      GERD (gastroesophageal reflux disease)      Hyperlipidemia      Hypertension      Lumbar spinal stenosis     use cane     Nicotine dependence      Obesity       Osteoporosis      Other chronic pain      Pacemaker      Permanent atrial fibrillation (H) 8/2008    S/P AV node ablation and pacemaker 10-25-12       Pleural effusion      Pulmonary hypertension (H)      PVD (peripheral vascular disease) (H)      Rectal stenosis      Tobacco abuse     current     Tricuspid regurgitation              Past Surgical History:     Past Surgical History:   Procedure Laterality Date     ARTHROPLASTY HIP Left 10/20/2018    Procedure: LEFT HIP HERACLIO-ARTHROPLASTY ;  Surgeon: Ish Fish MD;  Location:  OR     ARTHROSCOPY SHOULDER RT/LT  1999, 2004    Bilateral, right then left     BONE MARROW BIOPSY, BONE SPECIMEN, NEEDLE/TROCAR N/A 8/29/2017    Procedure: BIOPSY BONE MARROW;  BONE MARROW BIOPSY;  Surgeon: Marino Cohen MD;  Location:  GI     C DEXA, BONE DENSITY, AXIAL SKEL       C MAMMOGRAM, SCREENING  1/2009     COLONOSCOPY N/A 10/7/2016    Procedure: COMBINED COLONOSCOPY, SINGLE OR MULTIPLE BIOPSY/POLYPECTOMY BY BIOPSY;  Surgeon: Cassandra Mccord MD;  Location:  GI     ESOPHAGOSCOPY, GASTROSCOPY, DUODENOSCOPY (EGD), COMBINED N/A 8/10/2017    Procedure: COMBINED ESOPHAGOSCOPY, GASTROSCOPY, DUODENOSCOPY (EGD), BIOPSY SINGLE OR MULTIPLE;  gastroscopy;  Surgeon: Helene Bustamante MD;  Location:  GI     H ABLATION AV NODE  10/2012     HC COLONOSCOPY THRU STOMA, DIAGNOSTIC  ? 2005     IMPLANT PACEMAKER  10/2012    [place] [id number], [id number]     JOINT REPLACEMTN, KNEE RT/LT      Joint Replacement knee bilateral     LAPAROSCOPIC CHOLECYSTECTOMY WITH CHOLANGIOGRAMS N/A 12/14/2015    Procedure: LAPAROSCOPIC CHOLECYSTECTOMY WITH CHOLANGIOGRAMS;  Surgeon: Ervin Aoms MD;  Location:  OR     LUMPECTOMY BREAST      Left-2004     PHACOEMULSIFICATION CLEAR CORNEA WITH STANDARD INTRAOCULAR LENS IMPLANT Left 7/16/2015    Procedure: PHACOEMULSIFICATION CLEAR CORNEA WITH STANDARD INTRAOCULAR LENS IMPLANT;  Surgeon: Sai Obregon MD;  Location: [facility]      PHACOEMULSIFICATION CLEAR CORNEA WITH TORIC INTRAOCULAR LENS IMPLANT Right 2017    Procedure: PHACOEMULSIFICATION CLEAR CORNEA WITH TORIC INTRAOCULAR LENS IMPLANT;  RIGHT EYE PHACOEMULSIFICATION CLEAR CORNEA WITH TORIC INTRAOCULAR LENS IMPLANT ;  Surgeon: Duc Richmond MD;  Location: [facility]             Social History:     Social History     Tobacco Use     Smoking status: Former Smoker     Packs/day: 0.25     Years: 45.00     Pack years: 11.25     Types: Cigarettes     Last attempt to quit: 2018     Years since quittin.5     Smokeless tobacco: Never Used   Substance Use Topics     Alcohol use: No     Alcohol/week: 0.0 oz             Family History:     Family History   Problem Relation Age of Onset     Hypertension Mother      Diabetes Mother          at 83 yrs     C.A.D. Father          age 70s     Breast Cancer No family hx of      Colon Cancer No family hx of              Immunizations:     VACCINE/DOSE   Diptheria   DPT   DTAP   HBIG   Hepatitis A   Hepatitis B   HIB   Influenza   Measles   Meningococcal   MMR   Mumps   Pneumococcal   Polio   Rubella   Small Pox   TDAP   Varicella   Zoster             Allergies:     Allergies   Allergen Reactions     Ambien [Zolpidem Tartrate] Visual Disturbance     Hallucinations and fell out of bed at night.     Penicillins Hives     Definity      Caused a syncopal episode.     Sulfa Drugs Itching     Cymbalta Rash     Fluconazole Rash             Medications:     Current Facility-Administered Medications   Medication     albuterol (PROAIR HFA/PROVENTIL HFA/VENTOLIN HFA) 108 (90 Base) MCG/ACT inhaler 2 puff     bisacodyl (DULCOLAX) EC tablet 10 mg     iron sucrose (VENOFER) 300 mg in sodium chloride 0.9 % 250 mL intermittent infusion     metoprolol tartrate (LOPRESSOR) tablet 25 mg     naloxone (NARCAN) injection 0.1-0.4 mg     ondansetron (ZOFRAN-ODT) ODT tab 4 mg     oxyCODONE (ROXICODONE) tablet 5 mg     potassium chloride (KLOR-CON) Packet 20 mEq      ranitidine (ZANTAC) tablet 150 mg     senna-docusate (SENOKOT-S/PERICOLACE) 8.6-50 MG per tablet 2 tablet     simvastatin (ZOCOR) tablet 10 mg     torsemide (DEMADEX) tablet 20 mg     umeclidinium (INCRUSE ELLIPTA) 62.5 MCG/INH inhaler 1 puff             Review of Systems:   CV: NEGATIVE for chest pain, palpitations or peripheral edema  C: NEGATIVE for fever, chills, change in weight  E/M: NEGATIVE for ear, mouth and throat problems  R: NEGATIVE for significant cough or SOB          Physical Exam:   All vitals have been reviewed  Patient Vitals for the past 24 hrs:   BP Temp Temp src Pulse Heart Rate Resp SpO2   12/20/18 0843 114/45 -- -- -- 70 18 93 %   12/20/18 0801 -- -- -- -- 70 -- --   12/20/18 0605 110/58 98.6  F (37  C) Axillary 70 70 18 --   12/20/18 0543 119/53 96.1  F (35.6  C) Axillary 70 70 16 --   12/20/18 0455 111/52 -- -- -- 70 -- --   12/20/18 0410 -- 97.2  F (36.2  C) Oral -- -- -- --   12/20/18 0339 116/58 96.8  F (36  C) Oral 71 71 18 93 %   12/20/18 0254 123/59 97  F (36.1  C) Oral -- 70 18 --   12/20/18 0112 -- -- -- -- -- -- 90 %   12/20/18 0107 117/56 96.5  F (35.8  C) Oral -- 70 17 (!) 89 %   12/19/18 2014 106/44 95.4  F (35.2  C) Oral 69 -- 16 92 %   12/19/18 1800 130/60 95.8  F (35.4  C) Oral 70 -- 16 95 %       Intake/Output Summary (Last 24 hours) at 12/20/2018 1552  Last data filed at 12/20/2018 1313  Gross per 24 hour   Intake 1190 ml   Output 200 ml   Net 990 ml     Dressing intact moderate amount of drainage noted.   Minimal erythema of the surrounding skin.   Bilateral calves are soft, non-tender.  Bilateral lower extremity is NVI.  Sensation intact bilateral lower extremities  Active dorsi and plantar flexion bilaterally  +Dp pulse            Data:   All laboratory data reviewed  Results for orders placed or performed during the hospital encounter of 12/19/18   Comprehensive metabolic panel   Result Value Ref Range    Sodium 141 133 - 144 mmol/L    Potassium 4.1 3.4 - 5.3 mmol/L     Chloride 104 94 - 109 mmol/L    Carbon Dioxide 29 20 - 32 mmol/L    Anion Gap 8 3 - 14 mmol/L    Glucose 223 (H) 70 - 99 mg/dL    Urea Nitrogen 35 (H) 7 - 30 mg/dL    Creatinine 1.76 (H) 0.52 - 1.04 mg/dL    GFR Estimate 27 (L) >60 mL/min/[1.73_m2]    GFR Estimate If Black 31 (L) >60 mL/min/[1.73_m2]    Calcium 9.0 8.5 - 10.1 mg/dL    Bilirubin Total 0.6 0.2 - 1.3 mg/dL    Albumin 2.8 (L) 3.4 - 5.0 g/dL    Protein Total 6.5 (L) 6.8 - 8.8 g/dL    Alkaline Phosphatase 82 40 - 150 U/L    ALT 10 0 - 50 U/L    AST 6 0 - 45 U/L   Iron and iron binding capacity   Result Value Ref Range    Iron 16 (L) 35 - 180 ug/dL    Iron Binding Cap 389 240 - 430 ug/dL    Iron Saturation Index 4 (L) 15 - 46 %   Vitamin B12   Result Value Ref Range    Vitamin B12 418 193 - 986 pg/mL   UA with Microscopic reflex to Culture   Result Value Ref Range    Color Urine Yellow     Appearance Urine Slightly Cloudy     Glucose Urine Negative NEG^Negative mg/dL    Bilirubin Urine Negative NEG^Negative    Ketones Urine Negative NEG^Negative mg/dL    Specific Gravity Urine 1.019 1.003 - 1.035    Blood Urine Negative NEG^Negative    pH Urine 6.0 5.0 - 7.0 pH    Protein Albumin Urine 30 (A) NEG^Negative mg/dL    Urobilinogen mg/dL Normal 0.0 - 2.0 mg/dL    Nitrite Urine Negative NEG^Negative    Leukocyte Esterase Urine Large (A) NEG^Negative    Source Midstream Urine     WBC Urine 23 (H) 0 - 5 /HPF    RBC Urine 2 0 - 2 /HPF    Squamous Epithelial /HPF Urine 21 (H) 0 - 1 /HPF    Mucous Urine Present (A) NEG^Negative /LPF    Hyaline Casts 1 0 - 2 /LPF   Occult blood stool   Result Value Ref Range    Occult Blood Positive (A) NEG^Negative   TSH with free T4 reflex   Result Value Ref Range    TSH 3.62 0.40 - 4.00 mU/L   Hemoglobin   Result Value Ref Range    Hemoglobin 7.8 (L) 11.7 - 15.7 g/dL   Gastroenterology IP Consult: anemia with gastrooccult positive, she was worked up in 2017 by [facility] for the same ,please advice; Consultant may enter orders: Yes;  Patient to be seen: Routine - within 24 hours; Requested Clinic/Group: Tara Davis PA-C     12/20/2018  9:53 AM  [facility]  Gastroenterology Consultation    Helene Gibbs  [address] [facility] [address]  83 year old female    Admission Date/Time: 12/19/2018  Primary Care Provider: Neisha Esparza    We were asked to see the patient in consultation by Dr. Young   for evaluation of heme + anemia.        HPI:  Helene Gibbs is a 83 year old female who has a past   medical history of type 2 diabetes, COPD with pulmonary   hypertension, stage 3 chronic renal failure who presents with   anemia (hemoglobin 6.6) on pre-operative exam for left hip I&D   after ORIF 11/20 with persistent wound drainage.    Patient with longstanding history of anemia with thorough workup   in past.  Colonoscopy completed 10/2016 to the cecum revealed 2   small polyps.  Pathology consistent with one tubular adenoma and   one hyperplastic polyp.  An upper endoscopy 8/2017 showed a   normal esophagus, erythematous mucosa in the antrum, and a normal   duodenum.  Biopsies showed chronic gastritis, negative H. Pylori,   negative celiac.      Her iron studies are low with an iron level of 16, iron   saturation of 4%.  Fecal occult blood testing is positive.    The patient denies abdominal pain, change in bowel movements,   hematochezia, melena, nausea, vomiting, heartburn, indigestion,   dysphagia, or odynophagia.  She has mild constipation at   baseline.          ROS: A comprehensive ten point review of systems was negative   aside from those in mentioned in the HPI.      MEDICATIONS:   No current facility-administered medications on file prior to   encounter.   Current Outpatient Medications on File Prior to Encounter:  ASPIRIN PO Take 81 mg by mouth daily   bisacodyl (DULCOLAX) 5 MG EC tablet Take 5-10 mg by mouth daily   as needed for constipation   ELIQUIS 2.5 MG tablet 2.5 mg  2 times daily    GlipiZIDE (GLUCOTROL PO) Take 5 mg by mouth every morning (before   breakfast)   magnesium hydroxide (MILK OF MAGNESIA) 400 MG/5ML suspension Take   by mouth daily as needed for constipation or heartburn   potassium chloride (KLOR-CON) 20 MEQ Packet Take 20 mEq by mouth   daily   pregabalin (LYRICA) 75 MG capsule Take 1 capsule (75 mg) by mouth   2 times daily   ranitidine (ZANTAC) 150 MG tablet TAKE ONE TABLET BY MOUTH TWO   TIMES A DAY   simvastatin (ZOCOR) 10 MG tablet Take 1 tablet (10 mg) by mouth   At Bedtime   tiotropium (SPIRIVA) 18 MCG capsule Inhale 1 capsule (18 mcg)   into the lungs every evening   torsemide (DEMADEX) 20 MG tablet Take 1 tablet (20 mg) by mouth   daily   ACCU-CHEK SMARTVIEW test strip USE 1 STRIP BY IN VITRO ROUTE 2   TIMES DAILY OR AS DIRECTED   ACE/ARB/ARNI NOT PRESCRIBED (INTENTIONAL) Please choose reason   not prescribed, below   albuterol (PROAIR HFA, PROVENTIL HFA, VENTOLIN HFA) 108 (90 BASE)   MCG/ACT inhaler Inhale 2 puffs into the lungs every 4 hours as   needed for shortness of breath / dyspnea or wheezing   blood glucose monitoring (NO BRAND SPECIFIED) meter device kit   Accucheck Mariza meter; verified with pharmacist   CLINDAMYCIN HCL PO Give 300 mg by mouth as needed for Dental   Infection Prophylaxis Give 1 tablet (300mg) 4 times daily as   needed. To be given the day prior to dental procedure   denosumab (PROLIA) 60 MG/ML SOLN injection Inject 1 mL (60 mg)   Subcutaneous every 6 months   fluticasone (FLONASE) 50 MCG/ACT spray Spray 1-2 sprays into both   nostrils daily as needed for rhinitis or allergies   hydrOXYzine (VISTARIL) 25 MG capsule Take 1 capsule (25 mg) by   mouth 2 times daily as needed for itching   Lidocaine (LIDOCARE) 4 % Patch Place 3 patches onto the skin   daily as needed for moderate pain   metoprolol tartrate (LOPRESSOR) 25 MG tablet Take 1 tablet (25   mg) by mouth 2 times daily   ondansetron (ZOFRAN ODT) 4 MG ODT tab Take 1 tablet (4  mg) by   mouth every 6 hours as needed for nausea       ALLERGIES:   Allergies   Allergen Reactions     Ambien [Zolpidem Tartrate] Visual Disturbance     Hallucinations and fell out of bed at night.     Penicillins Hives     Definity      Caused a syncopal episode.     Sulfa Drugs Itching     Cymbalta Rash     Fluconazole Rash       Past Medical History:   Diagnosis Date     Anemia      Ankle arthritis      Arthritis      Back pain      Bell's palsy 9/08    left     Breast CA (H) 2004-    left lumpectomy, radiation, -recurrence     Breast cancer (H) 2004    Left breast lumpectomy w LN disection, and radiation therapy     CKD (chronic kidney disease)     stage 3 baseline Cr 1.3     Colon polyps     colonoscopy-9/12-next due in 9/13     Congestive heart failure (H)      COPD (chronic obstructive pulmonary disease) (H)      Coronary artery disease      DM (diabetes mellitus) (H)      GERD (gastroesophageal reflux disease)      Hyperlipidemia      Hypertension      Lumbar spinal stenosis     use cane     Nicotine dependence      Obesity      Osteoporosis      Other chronic pain      Pacemaker      Permanent atrial fibrillation (H) 8/2008    S/P AV node ablation and pacemaker 10-25-12       Pleural effusion      Pulmonary hypertension (H)      PVD (peripheral vascular disease) (H)      Rectal stenosis      Tobacco abuse     current     Tricuspid regurgitation        Past Surgical History:   Procedure Laterality Date     ARTHROPLASTY HIP Left 10/20/2018    Procedure: LEFT HIP HERACLIO-ARTHROPLASTY ;  Surgeon: Ish Fish MD;  Location:  OR     ARTHROSCOPY SHOULDER RT/LT  1999, 2004    Bilateral, right then left     BONE MARROW BIOPSY, BONE SPECIMEN, NEEDLE/TROCAR N/A 8/29/2017    Procedure: BIOPSY BONE MARROW;  BONE MARROW BIOPSY;  Surgeon:   Marino Cohen MD;  Location:  GI     C DEXA, BONE DENSITY, AXIAL SKEL       C MAMMOGRAM, SCREENING  1/2009     COLONOSCOPY N/A 10/7/2016    Procedure: COMBINED  COLONOSCOPY, SINGLE OR MULTIPLE   BIOPSY/POLYPECTOMY BY BIOPSY;  Surgeon: Cassandra Mccord MD;    Location:  GI     ESOPHAGOSCOPY, GASTROSCOPY, DUODENOSCOPY (EGD), COMBINED N/A   8/10/2017    Procedure: COMBINED ESOPHAGOSCOPY, GASTROSCOPY, DUODENOSCOPY   (EGD), BIOPSY SINGLE OR MULTIPLE;  gastroscopy;  Surgeon: Helene Bustamante MD;  Location:  GI     H ABLATION AV NODE  10/2012     HC COLONOSCOPY THRU STOMA, DIAGNOSTIC  ?      IMPLANT PACEMAKER  10/2012    [place] [id number], [id number]     JOINT REPLACEMTN, KNEE RT/LT      Joint Replacement knee bilateral     LAPAROSCOPIC CHOLECYSTECTOMY WITH CHOLANGIOGRAMS N/A 2015      Procedure: LAPAROSCOPIC CHOLECYSTECTOMY WITH CHOLANGIOGRAMS;    Surgeon: Ervin Amos MD;  Location:  OR     LUMPECTOMY BREAST      Left-     PHACOEMULSIFICATION CLEAR CORNEA WITH STANDARD INTRAOCULAR LENS   IMPLANT Left 2015    Procedure: PHACOEMULSIFICATION CLEAR CORNEA WITH STANDARD   INTRAOCULAR LENS IMPLANT;  Surgeon: Sai Obregon MD;    Location: [facility]     PHACOEMULSIFICATION CLEAR CORNEA WITH TORIC INTRAOCULAR LENS   IMPLANT Right 2017    Procedure: PHACOEMULSIFICATION CLEAR CORNEA WITH TORIC   INTRAOCULAR LENS IMPLANT;  RIGHT EYE PHACOEMULSIFICATION CLEAR   CORNEA WITH TORIC INTRAOCULAR LENS IMPLANT ;  Surgeon: Duc Richmond MD;  Location: [facility]         SOCIAL HISTORY:  Social History     Tobacco Use     Smoking status: Former Smoker     Packs/day: 0.25     Years: 45.00     Pack years: 11.25     Types: Cigarettes     Last attempt to quit: 2018     Years since quittin.5     Smokeless tobacco: Never Used   Substance Use Topics     Alcohol use: No     Alcohol/week: 0.0 oz     Drug use: No       FAMILY HISTORY:  Family History   Problem Relation Age of Onset     Hypertension Mother      Diabetes Mother          at 83 yrs     C.A.D. Father          age 70s     Breast Cancer No family hx of      Colon Cancer No family hx of         PHYSICAL EXAM:   /45   Pulse 70   Temp 98.6  F (37  C) (Axillary)   Resp   18   LMP  (LMP Unknown)   SpO2 93%     Constitutional: NAD, comfortable  Cardiovascular: RRR, normal S1 and S2, no r/c/g/m  Respiratory: CTAB  Psychiatric: mentation appears normal and affect normal  Head: Normocephalic. Atraumatic.    Neck: Neck supple. No adenopathy. Thyroid symmetric, normal size,   trachea midline  Eyes:  PERRL, no icterus  ENT: Hearing adequate, pharynx normal without erythema or exudate  Abdomen:   Auscultation: + BS  Appearance: non-distended  Palpation: non-tender  NEURO: grossly negative  SKIN: no suspicious lesions or rashes  LYMPH:   anterior cervical: no adenopathy  posterior cervical: no adenopathy  supraclavicular: no adenopathy          ADDITIONAL COMMENTS:   I reviewed the patient's new clinical lab test results.   Recent Labs   Lab Test 12/20/18  0726 12/19/18  1133 11/01/18 10/26/18  0659 10/25/18  0657  10/22/18  0711  10/19/18  2156  01/05/18  0800 01/04/18  1101   WBC  --  5.0  --   --  9.1  --  10.5   < > 9.7   < > 7.0 6.5   HGB 7.8* 6.6* 11.2* 10.4* 11.0*   < > 8.9*   < > 9.3*   < > 11.8   12.6   MCV  --  86  --   --  91  --  91   < > 93   < > 93 92   PLT  --  296  --  203 172   < > 195   < > 260   < > 215 223   INR  --   --   --   --   --   --   --   --  1.54*  --  1.00 0.97    < > = values in this interval not displayed.     Recent Labs   Lab Test 12/19/18  2053 11/19/18  1421 11/15/18  1203    139 137   POTASSIUM 4.1 4.8 4.2   CHLORIDE 104 101 100   CO2 29 33* 28   BUN 35* 47* 43*   CR 1.76* 2.21* 1.87*   ANIONGAP 8 5 9   GERARD 9.0 8.7 9.2   * 134* 93     Recent Labs   Lab Test 12/19/18  2230 12/19/18  2053 11/05/18 11/01/18  10/23/18  0722 10/22/18  2300  10/19/18  2300  07/18/18  1200  07/16/18  1718  05/22/18  1639  03/29/16  1301  12/09/15  0714   ALBUMIN  --  2.8* 2.8* 2.7*   < > 2.4*  --    < >  --    < > 3.5    --  3.5  --  4.3   < > 3.5   < > 3.2*   BILITOTAL  --   0.6 0.5 0.6   < > 0.8  --    < >  --    < > 1.0    --  1.1  --  0.7   < > 0.6   < > 0.8   DBIL  --   --   --   --   --  0.4*  --   --   --   --   --   --     --   --   --   --  0.2  --  0.5*   ALT  --  10 21 33   < > 229*  --    < >  --    < > 17  --  18  --    24   < > 24   < > 156*   AST  --  6 17 20   < > 110*  --    < >  --    < > 13  --  14  --    27   < > 10   < > 54*   ALKPHOS  --  82 84 78   < > 105  --    < >  --    < > 79  --  78    --  82   < > 96   < > 141   PROTEIN 30*  --   --   --   --   --  30*  --  10*   < >  --    <   >  --    < >  --    < >  --    < >  --    LIPASE  --   --   --   --   --   --   --   --   --   --  78  --    62*  --  120   < >  --   --   --     < > = values in this interval not displayed.             .    CONSULTATION ASSESSMENT AND PLAN:      84 yo female admitted with acute on chronic iron deficiency   anemia.  Patient with heme + stools but no evidence of yayo   bleeding.  Colonoscopy last completed in 2016 to the cecum   positive for one adenomatous and one hyperplastic polyp.  Upper   endoscopy in 8/2017 did not show any abnormalities to account for   blood loss.  Patient denies ASA, NSAID use but is on Eliquis for   atrial fibrillation.  The patient reports that she does not wish to have a repeat   colonoscopy procedure as the last one was quite painful.  A   capsule endoscopy could be considered but is unclear if the   patient would be willing to pursue further treatment if an   abnormality was found.    -  Monitor H/H; transfuse prn.  -  Agree with iron repletion.  -  Patient currently refusing repeat colonoscopy; may consider   capsule endoscopy but would have to clarify patient's willingness   to pursue any findings from that procedure.      I discussed the patient's findings and plan with Dr. Hayden.          Tara Vang, [facility]enter[facility]  Office:  [phone] call if needed after 5PM  Cell:  [phone], not available after 5PM at this number      ABO/Rh type and screen   Result Value Ref Range    Units Ordered 1     ABO O     RH(D) Pos     Antibody Screen Neg     Test Valid Only At [facility]        Specimen Expires 12/22/2018     Crossmatch Red Blood Cells    Blood component   Result Value Ref Range    Unit Number [id number]     Blood Component Type Red Blood Cells Leukocyte Reduced     Division Number 00     Status of Unit Released to care unit 12/20/2018 0234     Blood Product Code [id number]     Unit Status ISS    Urine Culture Aerobic Bacterial   Result Value Ref Range    Specimen Description Midstream Urine     Special Requests Specimen received in preservative     Culture Micro PENDING      *Note: Due to a large number of results and/or encounters for the requested time period, some results have not been displayed. A complete set of results can be found in Results Review.          Attestation:  I have reviewed today's vital signs, notes, medications, labs and imaging with Dr. Isabel.  Amount of time performed on this consult: 30 minutes.    Azul Crow PA-C

## 2018-12-20 NOTE — PLAN OF CARE
A&Ox4. VSS. Up A1 W. Regular diet. IV-SL. UA sent to lab. Blood transfusion 1unit, completed at 0605. I&D of left hip incision site. Dressing marked with no change. Denies pain. Continue to monitor.

## 2018-12-20 NOTE — PROGRESS NOTES
[facility]    Hospitalist Progress Note    Assessment & Plan   Helene Gibbs is a 83 year old female with DM type 2, COPD with pulm htn, CRF stage 3 who presents with anemia on preop exam today -- hgb 6.6 and preop for left hip I & D after Left hip fracture with ORIF 11/20/18 and persistent wound drainage:  Anemia  --severe iron deficiency noted, her Gastroccult positive  --received 1 unit packed redblood cells and one dose of iv venofer   --GI consulted, patient refused repeat colonoscopy or esophagogastroduodenoscopy  , her recent work up with in 1 year   --plan for capsule endoscopy outpatient -she is ok with that   --monitor hemoglobin , no active profound bleeding noted.      Type 2 diabetes mellitus with diabetic nephropathy (H)  --on glipizide at home  --continue sliding scale here with diabetic diet     CKD (chronic kidney disease) stage 3, GFR 30-59 ml/min (H)  --monitor creatinine     Pulmonary hypertension (H)    COPD (chronic obstructive pulmonary disease) (H)  --prn nebs     Chronic atrial fib, S/P ablation -- on Eliquis  --currently on hold with plans for surgery   --continue metoprolol and statin     S/p left hip fracture 11/20/18 -- persistent drainage  -- orthopedics  Consult pending     DVT Prophylaxis: sequential compression device   Code Status: Prior     Disposition: Expected discharge in 2-3 days     Selene Young MD  Text Page   (7am to 6pm)    Interval History   No nausea, wondering about the plan about her hip surgery, there was plan for elective surgery tomorrow, pending orthopedics  Input, she talked to GI and  refused any need for colonoscopy or esophagogastroduodenoscopy .     -Data reviewed today: I reviewed all new labs and imaging results over the last 24 hours.     Physical Exam   Temp: 98.6  F (37  C) Temp src: Axillary BP: 114/45 Pulse: 70 Heart Rate: 70 Resp: 18 SpO2: 93 % O2 Device: None (Room air)    There were no vitals filed for this visit.  Vital Signs  with Ranges  Temp:  [95.4  F (35.2  C)-98.6  F (37  C)] 98.6  F (37  C)  Pulse:  [69-71] 70  Heart Rate:  [70-71] 70  Resp:  [16-18] 18  BP: (106-130)/(44-60) 114/45  SpO2:  [89 %-95 %] 93 %  I/O last 3 completed shifts:  In: 720 [P.O.:420]  Out: 200 [Urine:200]    Constitutional:Awake, alert, cooperative, no apparent distress  Respiratory: Clear to auscultation bilaterally, no crackles or wheezing  Cardiovascular: Regular rate and rhythm, normal S1 and S2, and no murmur noted  GI: Normal bowel sounds, soft, non-distended, non-tender  Skin/Integumen: No rashes, no cyanosis, left leg range of movement reduced   Neuro : moving all 4 extremities, no focal deficit noted     Medications       iron sucrose (VENOFER) intermittent infusion  300 mg Intravenous Daily     metoprolol tartrate  25 mg Oral BID     potassium chloride  20 mEq Oral Daily     ranitidine  150 mg Oral Daily     senna-docusate  2 tablet Oral BID     simvastatin  10 mg Oral At Bedtime     torsemide  20 mg Oral Daily     umeclidinium  1 puff Inhalation QPM       Data   Recent Labs   Lab 12/20/18  0726 12/19/18 2053 12/19/18  1133   WBC  --   --  5.0   HGB 7.8*  --  6.6*   MCV  --   --  86   PLT  --   --  296   NA  --  141 143   POTASSIUM  --  4.1 4.2   CHLORIDE  --  104 106   CO2  --  29 30   BUN  --  35* 33*   CR  --  1.76* 1.58*   ANIONGAP  --  8 7   GERARD  --  9.0 9.7   GLC  --  223* 143*   ALBUMIN  --  2.8* 3.1*   PROTTOTAL  --  6.5* 7.1   BILITOTAL  --  0.6 0.6   ALKPHOS  --  82 74   ALT  --  10 9   AST  --  6 6     Recent Labs   Lab 12/19/18 2053 12/19/18  1133   * 143*       Imaging:   No results found for this or any previous visit (from the past 24 hour(s)).

## 2018-12-21 NOTE — ANESTHESIA POSTPROCEDURE EVALUATION
Patient: Helene Gibbs    Procedure(s):  INCISION AND DRAINAGE OF LEFT HIP WITH EXCISION OF BONE FRAGMENT    Diagnosis:LEFT HIP INFECTION STATUS POST LEFT HIP ARTHROPLASTY  Diagnosis Additional Information: No value filed.    Anesthesia Type:  General, ETT    Note:  Anesthesia Post Evaluation    Patient location during evaluation: PACU  Patient participation: Able to fully participate in evaluation  Level of consciousness: awake  Pain management: adequate  Airway patency: patent  Cardiovascular status: acceptable  Respiratory status: acceptable  Hydration status: acceptable  PONV: controlled     Anesthetic complications: None          Last vitals:  Vitals:    12/21/18 1630 12/21/18 1640 12/21/18 1650   BP: 121/61 122/58    Pulse: 70 70    Resp: 9 12 13   Temp: 35.9  C (96.6  F) 36  C (96.8  F) 36  C (96.8  F)   SpO2: 98% 99% 97%         Electronically Signed By: Kavin Granados MD  December 21, 2018  5:07 PM

## 2018-12-21 NOTE — PLAN OF CARE
Pt A&O. VSS on ra. Up with SBA with walker. Regular diet, NPO @ midnight. LS clear. BS active. Pt c/o pain, oxycodone given. Scheduled I&D surgery tomorrow. Will continue to monitor.

## 2018-12-21 NOTE — ANESTHESIA CARE TRANSFER NOTE
Patient: Helene Gibbs    Procedure(s):  INCISION AND DRAINAGE OF LEFT HIP WITH EXCISION OF BONE FRAGMENT    Diagnosis: LEFT HIP INFECTION STATUS POST LEFT HIP ARTHROPLASTY  Diagnosis Additional Information: No value filed.    Anesthesia Type:   General, ETT     Note:  Airway :Face Mask  Patient transferred to:PACU  Comments: VSS. Airway and IV patent. Patient comfortable. Report to RN. Stable care transfer.Handoff Report: Identifed the Patient, Identified the Reponsible Provider, Reviewed the pertinent medical history, Discussed the surgical course, Reviewed Intra-OP anesthesia mangement and issues during anesthesia, Set expectations for post-procedure period and Allowed opportunity for questions and acknowledgement of understanding      Vitals: (Last set prior to Anesthesia Care Transfer)    CRNA VITALS  12/21/2018 1446 - 12/21/2018 1524      12/21/2018             Pulse:  69    Ht Rate:  70    SpO2:  100 %    Resp Rate (set):  10                Electronically Signed By: ALLEN Godoy CRNA  December 21, 2018  3:24 PM

## 2018-12-21 NOTE — PLAN OF CARE
pt a&o. Up with one and walker. Ambulated in halls x2. Denies pain. Regular diet, good appetite. Dressing on left hip changed this afternoon. Plan is for I&D tomorrow at 1230 and NPO at midnight. Pt calls appropriately, will continue to monitor.

## 2018-12-21 NOTE — BRIEF OP NOTE
[facility]    Brief Operative Note    Pre-operative diagnosis: LEFT HIP INFECTION STATUS POST LEFT HIP ARTHROPLASTY  Post-operative diagnosis Left hip superficial infection and greater trochanteric fracture  Procedure: Procedure(s):  INCISION AND DRAINAGE OF LEFT HIP WITH EXCISION OF BONE FRAGMENT  Surgeon: Surgeon(s) and Role:     * Ish Fish MD - Primary  Anesthesia: General   Estimated blood loss: Less than 50 ml  Drains: None  Specimens:   ID Type Source Tests Collected by Time Destination   1 : LEFT HIP WOUND Wound Hip, Left ANAEROBIC BACTERIAL CULTURE, GRAM STAIN, WOUND CULTURE AEROBIC BACTERIAL Ish Fish MD 12/21/2018  2:02 PM      Findings:   See operative dictation  Complications: None.  Implants: None.    WBAT  Posterior hip precautions  ID consult for superficial wound infection which did not seem to extend to the implant itself.  abx per ID  DVT prophylaxis - ok to restart home eloquis tomorrow am

## 2018-12-21 NOTE — ANESTHESIA PREPROCEDURE EVALUATION
Anesthesia Evaluation     . Pt has had prior anesthetic.     History of anesthetic complications   - PONV        ROS/MED HX    ENT/Pulmonary: Comment: Dental abscess on antibiotics     (+)tobacco use, Past use COPD, , . .   (-) sleep apnea   Neurologic: Comment: Bell's palsey    (+)dementia, neuropathy (feet)     Cardiovascular: Comment: Name: PATRICIA BOBO  MRN: [id number]  : 1935  Study Date: 2018 01:54 PM  Age: 82 yrs  Gender: Female  [place] [facility] [place]  Reason For Study: Atrial fibrillation, unspecified type (H)  Ordering Physician: CARSON VILLALPANDO  Referring Physician: CARSON VILLALPANDO  Performed By: Crystal Fuentes,     BSA: 1.9 m2  Height: 66 in  Weight: 169 lb  HR: 70  BP: 144/57 mmHg  _____________________________________________________________________________  __     Procedure  Complete Echo Adult. Contrast Lumason.     _____________________________________________________________________________  __        Interpretation Summary     The visual ejection fraction is estimated at 50-55%.  There is severe biatrial enlargement.  There is moderate (2+) tricuspid regurgitation.  Mild (35-45mmHg) pulmonary hypertension is present.  The IVC is normal in size and reactivity with respiration, suggesting normal  central venous pressure.  The rhythm was atrial fibrillation with paced rhythm.  Compared to the prior study dated 8/10/17, there have been no changes.  _____________________________________________________________________________  __        Left Ventricle  The left ventricle is normal in size. There is normal left ventricular wall  thickness. The visual ejection fraction is estimated at 50-55%. Diastolic  Doppler findings (E/E' ratio and/or other parameters) suggest left ventricular  filling pressures are indeterminate. No regional wall motion abnormalities  noted.     Right Ventricle  The right ventricle is normal in structure, function and size. There is  a  catheter/pacemaker lead seen in the right ventricle.     Atria  There is severe biatrial enlargement. There is no color Doppler evidence of an  atrial shunt.     Mitral Valve  There is moderate mitral annular calcification. The mitral valve leaflets  appear thickened, but open well. There is mild (1+) mitral regurgitation.     Tricuspid Valve  The tricuspid valve is normal in structure and function. There is moderate  (2+) tricuspid regurgitation. The right ventricular systolic pressure is  approximated at 35mmHg plus the right atrial pressure. Mild (35-45mmHg)  pulmonary hypertension is present.        Aortic Valve  The aortic valve is trileaflet with aortic valve sclerosis. There is trace  aortic regurgitation.     Pulmonic Valve  Normal pulmonic valve. There is trace to mild pulmonic valvular regurgitation.     Vessels  Normal size aorta. The IVC is normal in size and reactivity with respiration,  suggesting normal central venous pressure.     Pericardium  The pericardium appears normal.     Rhythm  The rhythm was atrial fibrillation with paced rhythm.    (+) Dyslipidemia, hypertension-Peripheral Vascular Disease-CAD, -past MI,-. Taking blood thinners : . CHF . . pacemaker :type: A V node ablation  . dysrhythmias a-fib, . pulmonary hypertension,       METS/Exercise Tolerance:     Hematologic: Comments: Inc. INR    (+) Anemia, -      Musculoskeletal: Comment: Spinal stenosis, thoracic compression fxs, cannot lay flat  (+) arthritis, , , -       GI/Hepatic: Comment: Rectal stenosis, inc. LFTs     (+) GERD Asymptomatic on medication,       Renal/Genitourinary:     (+) chronic renal disease, type: CRI,       Endo:     (+) type II DM .      Psychiatric:     (+) psychiatric history anxiety and depression      Infectious Disease:         Malignancy:   (+) Malignancy History of Breast          Other:    (+) H/O Chronic Pain,H/O chronic opiod use ,                    Physical Exam      Airway   Mallampati: II  TM  distance: >3 FB  Neck ROM: full    Dental   (+) loose, missing, chipped, caps and implants  Comment: Upper tooth loose-MS Gibbs understands may lose tooth during procedure and we cannot be responsible for teeth replacement.      Cardiovascular   Rhythm and rate: regular      Pulmonary    breath sounds clear to auscultation                    Anesthesia Plan      History & Physical Review  History and physical reviewed and following examination; no interval change.    ASA Status:  4 .        Plan for General and ETT   PONV prophylaxis:  Ondansetron (or other 5HT-3)  Additional equipment: Videolaryngoscope and Arterial Line No midazolam, Glidescope, phenylephrine infusion, monitor glucose, magnet, electric cautery pad distal from pacer       Postoperative Care      Consents  Anesthetic plan, risks, benefits and alternatives discussed with:  Patient.  Use of blood products discussed: Yes.   Use of blood products discussed with Patient.  .                          .  Procedure: Procedure(s):  LEFT HIP HERACLIO-ARTHROPLASTY (S&N)  Preop diagnosis: LEFT HIP FEMORAL NECK FRACTURE    Allergies   Allergen Reactions     Ambien [Zolpidem Tartrate] Visual Disturbance     Hallucinations and fell out of bed at night.     Penicillins Hives     Definity      Caused a syncopal episode.     Sulfa Drugs Itching     Cymbalta Rash     Fluconazole Rash     Past Medical History:   Diagnosis Date     Anemia      Ankle arthritis      Arthritis      Back pain      Bell's palsy 9/08    left     Breast CA (H) 2004-    left lumpectomy, radiation, -recurrence     Breast cancer (H) 2004    Left breast lumpectomy w LN disection, and radiation therapy     CKD (chronic kidney disease)     stage 3 baseline Cr 1.3     Colon polyps     colonoscopy-9/12-next due in 9/13     Congestive heart failure (H)      COPD (chronic obstructive pulmonary disease) (H)      Coronary artery disease      DM (diabetes mellitus) (H)      GERD (gastroesophageal reflux  disease)      Hyperlipidemia      Hypertension      Lumbar spinal stenosis     use cane     Nicotine dependence      Obesity      Osteoporosis      Other chronic pain      Pacemaker      Permanent atrial fibrillation (H) 8/2008    S/P AV node ablation and pacemaker 10-25-12       Pleural effusion      Pulmonary hypertension (H)      PVD (peripheral vascular disease) (H)      Rectal stenosis      Tobacco abuse     current     Tricuspid regurgitation      Past Surgical History:   Procedure Laterality Date     ARTHROPLASTY HIP Left 10/20/2018    Procedure: LEFT HIP HERACLIO-ARTHROPLASTY ;  Surgeon: Ish Fish MD;  Location:  OR     ARTHROSCOPY SHOULDER RT/LT  1999, 2004    Bilateral, right then left     BONE MARROW BIOPSY, BONE SPECIMEN, NEEDLE/TROCAR N/A 8/29/2017    Procedure: BIOPSY BONE MARROW;  BONE MARROW BIOPSY;  Surgeon: Marino Cohen MD;  Location:  GI     C DEXA, BONE DENSITY, AXIAL SKEL       C MAMMOGRAM, SCREENING  1/2009     COLONOSCOPY N/A 10/7/2016    Procedure: COMBINED COLONOSCOPY, SINGLE OR MULTIPLE BIOPSY/POLYPECTOMY BY BIOPSY;  Surgeon: Cassandra Mccord MD;  Location:  GI     ESOPHAGOSCOPY, GASTROSCOPY, DUODENOSCOPY (EGD), COMBINED N/A 8/10/2017    Procedure: COMBINED ESOPHAGOSCOPY, GASTROSCOPY, DUODENOSCOPY (EGD), BIOPSY SINGLE OR MULTIPLE;  gastroscopy;  Surgeon: Helene Bustamante MD;  Location:  GI     H ABLATION AV NODE  10/2012     HC COLONOSCOPY THRU STOMA, DIAGNOSTIC  ? 2005     IMPLANT PACEMAKER  10/2012    [place] [id number], [id number]     JOINT REPLACEMTN, KNEE RT/LT      Joint Replacement knee bilateral     LAPAROSCOPIC CHOLECYSTECTOMY WITH CHOLANGIOGRAMS N/A 12/14/2015    Procedure: LAPAROSCOPIC CHOLECYSTECTOMY WITH CHOLANGIOGRAMS;  Surgeon: Ervin Amos MD;  Location:  OR     LUMPECTOMY BREAST      Left-2004     PHACOEMULSIFICATION CLEAR CORNEA WITH STANDARD INTRAOCULAR LENS IMPLANT Left 7/16/2015    Procedure: PHACOEMULSIFICATION CLEAR CORNEA WITH STANDARD  INTRAOCULAR LENS IMPLANT;  Surgeon: Sai Obregon MD;  Location: [facility]     PHACOEMULSIFICATION CLEAR CORNEA WITH TORIC INTRAOCULAR LENS IMPLANT Right 2017    Procedure: PHACOEMULSIFICATION CLEAR CORNEA WITH TORIC INTRAOCULAR LENS IMPLANT;  RIGHT EYE PHACOEMULSIFICATION CLEAR CORNEA WITH TORIC INTRAOCULAR LENS IMPLANT ;  Surgeon: Duc Richmond MD;  Location: [facility]     Social History     Tobacco Use     Smoking status: Former Smoker     Packs/day: 0.25     Years: 45.00     Pack years: 11.25     Types: Cigarettes     Last attempt to quit: 2018     Years since quittin.5     Smokeless tobacco: Never Used   Substance Use Topics     Alcohol use: No     Alcohol/week: 0.0 oz     Prior to Admission medications    Medication Sig Start Date End Date Taking? Authorizing Provider   apixaban ANTICOAGULANT (ELIQUIS) 2.5 MG tablet Take 1 tablet (2.5 mg) by mouth 2 times daily 18  Yes Neisha Esparza MD   ACCU-CHEK SMARTVIEW test strip USE 1 STRIP BY IN VITRO ROUTE 2 TIMES DAILY OR AS DIRECTED 18   Neisha Esparza MD   albuterol (PROAIR HFA, PROVENTIL HFA, VENTOLIN HFA) 108 (90 BASE) MCG/ACT inhaler Inhale 2 puffs into the lungs every 4 hours as needed for shortness of breath / dyspnea or wheezing 2/10/15   Neisha Esparza MD   aspirin EC 81 MG EC tablet Take 1 tablet (81 mg) by mouth daily 18   Dolores Mast PA-C   diazepam 10 mg SUPP Insert one suppository vaginally at bedtime as needed 18   Neisha Esparza MD   fluticasone (FLONASE) 50 MCG/ACT spray INHALE 1 TO 2 SPRAYS INTO BOTH NOSTRILS DAILY 10/16/18   Neisha Esparza MD   FUROSEMIDE PO Take 20 mg by mouth daily    Unknown, Entered By History   GlipiZIDE (GLUCOTROL PO) Take 5 mg by mouth every morning (before breakfast)    Reported, Patient   guaiFENesin (MUCINEX) 600 MG 12 hr tablet Take 1 tablet (600 mg) by mouth 2 times daily 18   Lucy Collier PA-C   HYDROcodone-acetaminophen (NORCO)  5-325 MG per tablet Take 1 tablet by mouth every 6 hours as needed for pain 8/9/18   Neisha Esparza MD   hydrOXYzine (VISTARIL) 25 MG capsule Take 1 capsule (25 mg) by mouth 2 times daily as needed for itching 4/25/17   Neisha Esparza MD   lidocaine (LIDODERM) 5 % Patch Apply up to 3 patches to painful area at once for up to 12 h within a 24 h period.  Remove after 12 hours. 8/9/18   Neisha Esparza MD   lisinopril (PRINIVIL/ZESTRIL) 10 MG tablet Take 1 tablet (10 mg) by mouth 2 times daily 5/14/18   Neisha Esparza MD   magnesium hydroxide (MILK OF MAGNESIA) 400 MG/5ML suspension Take 30 mLs by mouth At Bedtime  8/20/14   Reported, Patient   metoclopramide (REGLAN) 5 MG tablet Take 1 tablet (5 mg) by mouth 3 times daily as needed 7/18/18   Trigger, Duran Young MD   metoprolol tartrate (LOPRESSOR) 25 MG tablet Take 1 tablet (25 mg) by mouth 2 times daily 5/14/18   Neisha Esparza MD   ondansetron (ZOFRAN ODT) 4 MG ODT tab Take 1 tablet (4 mg) by mouth every 6 hours as needed for nausea 10/16/17   Neisha Esparza MD   pregabalin (LYRICA) 75 MG capsule Take 1 capsule (75 mg) by mouth 2 times daily 8/9/18   Neisha Esparza MD   ranitidine (ZANTAC) 150 MG tablet TAKE ONE TABLET BY MOUTH TWO TIMES A DAY 3/21/18   Neisha Esparza MD   simvastatin (ZOCOR) 10 MG tablet TAKE 1 TABLET (10 MG) BY MOUTH AT BEDTIME 3/14/18   Neisha Esparza MD   tiotropium (SPIRIVA) 18 MCG capsule Inhale 1 capsule (18 mcg) into the lungs every evening 7/17/18   Neisah Esparza MD   traMADol (ULTRAM) 50 MG tablet Take 1 tablet (50 mg) by mouth every 6 hours as needed for severe pain Do not take hydrocodone with this 9/13/18   Neisha Esparza MD     Current Facility-Administered Medications Ordered in Epic   Medication Dose Route Frequency Last Rate Last Dose     [Auto Hold] albuterol (PROAIR HFA/PROVENTIL HFA/VENTOLIN HFA) 108 (90 Base) MCG/ACT inhaler 2 puff  2 puff Inhalation Q4H PRN         [Auto Hold] bisacodyl  (DULCOLAX) EC tablet 5-10 mg  5-10 mg Oral Daily PRN         clindamycin (CLEOCIN) infusion 900 mg  900 mg Intravenous Pre-Op/Pre-procedure x 1 dose         clindamycin (CLEOCIN) infusion 900 mg  900 mg Intravenous See Admin Instructions         [Auto Hold] fluticasone (FLONASE) 50 MCG/ACT spray 1-2 spray  1-2 spray Both Nostrils Daily PRN         [Auto Hold] hydrOXYzine (VISTARIL) capsule 25 mg  25 mg Oral BID PRN         lactated ringers infusion   Intravenous Continuous         lidocaine 1 % 1 mL  1 mL Other Q1H PRN         [Auto Hold] magnesium hydroxide (MILK OF MAGNESIA) suspension 5 mL  5 mL Oral Daily PRN         [Auto Hold] metoprolol tartrate (LOPRESSOR) tablet 25 mg  25 mg Oral BID   25 mg at 12/21/18 0752     [Auto Hold] naloxone (NARCAN) injection 0.1-0.4 mg  0.1-0.4 mg Intravenous Q2 Min PRN         [Auto Hold] ondansetron (ZOFRAN-ODT) ODT tab 4 mg  4 mg Oral Q6H PRN         [Auto Hold] oxyCODONE (ROXICODONE) tablet 5 mg  5 mg Oral Q4H PRN   5 mg at 12/20/18 2217     [Auto Hold] potassium chloride (KLOR-CON) Packet 20 mEq  20 mEq Oral Daily   20 mEq at 12/20/18 0803     [Auto Hold] pregabalin (LYRICA) capsule 75 mg  75 mg Oral BID   75 mg at 12/21/18 0751     [Auto Hold] ranitidine (ZANTAC) tablet 150 mg  150 mg Oral Daily   150 mg at 12/21/18 0752     [Auto Hold] senna-docusate (SENOKOT-S/PERICOLACE) 8.6-50 MG per tablet 2 tablet  2 tablet Oral BID   2 tablet at 12/21/18 0751     [Auto Hold] simvastatin (ZOCOR) tablet 10 mg  10 mg Oral At Bedtime   10 mg at 12/20/18 2203     [Auto Hold] torsemide (DEMADEX) tablet 20 mg  20 mg Oral Daily   20 mg at 12/21/18 0751     [Auto Hold] umeclidinium (INCRUSE ELLIPTA) 62.5 MCG/INH inhaler 1 puff  1 puff Inhalation QPM   1 puff at 12/20/18 2017     No current [facility]-ordered outpatient medications on file.       Wt Readings from Last 1 Encounters:   12/19/18 82.1 kg (181 lb)     Temp Readings from Last 1 Encounters:   12/21/18 36.1  C (97  F) (Oral)     BP Readings  from Last 6 Encounters:   18 117/49   18 104/56   18 102/59   11/15/18 93/51   18 116/65   18 140/76     Pulse Readings from Last 4 Encounters:   18 70   18 79   18 69   11/15/18 74     Resp Readings from Last 1 Encounters:   18 18     SpO2 Readings from Last 1 Encounters:   10/20/18 95%     Recent Labs   Lab Test  10/19/18   2156  07/22/18   0555   NA  135  133   POTASSIUM  4.4  4.3   CHLORIDE  96  99   CO2  30  26   ANIONGAP  9  8   GLC  178*  155*   BUN  43*  14   CR  1.80*  1.04   GERARD  8.6  10.0     Recent Labs   Lab Test  10/19/18   2156  07/21/18   0800  18   1200  18   1718   AST  300*  15  13  14   ALT  255*  16  17  18   ALKPHOS  98  75  79  78   BILITOTAL  0.7  1.1  1.0  1.1   LIPASE   --    --   78  62*     Recent Labs   Lab Test  10/19/18   2156  08/09/18   1332   WBC  9.7  7.2   HGB  9.3*  14.1   PLT  260  254     Recent Labs   Lab Test  10/19/18   2156   ABO  O   RH  Pos     Recent Labs   Lab Test  10/19/18   2156  05/07/18   0608   18   0800   INR  1.54*   --    --   1.00   PTT  31  51*   < >   --     < > = values in this interval not displayed.      Recent Labs   Lab Test  18   0800  18   1718  18   1639   TROPI  <0.015  <0.015  <0.015     Recent Labs   Lab Test  12/27/15   1015  12   1310   PH  7.41  7.35   PCO2  50*  44   PO2  79*  55*   HCO3  32*  24     No results for input(s): HCG in the last 21763 hours.  No results found for this or any previous visit (from the past 744 hour(s)).    RECENT LABS:   ECG:   ECHO:   Anesthesia Pre-Procedure Evaluation    Patient: Helene Gibbs   MRN: [id number] : 1935          Preoperative Diagnosis: LEFT HIP INFECTION STATUS POST LEFT HIP ARTHROPLASTY    Procedure(s):  INCISION AND DRAINAGE LEFT HIP    Past Medical History:   Diagnosis Date     Anemia      Ankle arthritis      Arthritis      Back pain      Bell's palsy     left     Breast CA (H) -     left lumpectomy, radiation, -recurrence     Breast cancer (H) 2004    Left breast lumpectomy w LN disection, and radiation therapy     CKD (chronic kidney disease)     stage 3 baseline Cr 1.3     Colon polyps     colonoscopy-9/12-next due in 9/13     Congestive heart failure (H)      COPD (chronic obstructive pulmonary disease) (H)      Coronary artery disease      DM (diabetes mellitus) (H)      GERD (gastroesophageal reflux disease)      Hyperlipidemia      Hypertension      Lumbar spinal stenosis     use cane     Nicotine dependence      Obesity      Osteoporosis      Other chronic pain      Pacemaker      Permanent atrial fibrillation (H) 8/2008    S/P AV node ablation and pacemaker 10-25-12       Pleural effusion      Pulmonary hypertension (H)      PVD (peripheral vascular disease) (H)      Rectal stenosis      Tobacco abuse     current     Tricuspid regurgitation      Past Surgical History:   Procedure Laterality Date     ARTHROPLASTY HIP Left 10/20/2018    Procedure: LEFT HIP HERACLIO-ARTHROPLASTY ;  Surgeon: Ish Fish MD;  Location:  OR     ARTHROSCOPY SHOULDER RT/LT  1999, 2004    Bilateral, right then left     BONE MARROW BIOPSY, BONE SPECIMEN, NEEDLE/TROCAR N/A 8/29/2017    Procedure: BIOPSY BONE MARROW;  BONE MARROW BIOPSY;  Surgeon: Marino Cohen MD;  Location:  GI     C DEXA, BONE DENSITY, AXIAL SKEL       C MAMMOGRAM, SCREENING  1/2009     COLONOSCOPY N/A 10/7/2016    Procedure: COMBINED COLONOSCOPY, SINGLE OR MULTIPLE BIOPSY/POLYPECTOMY BY BIOPSY;  Surgeon: Cassandra Mccord MD;  Location:  GI     ESOPHAGOSCOPY, GASTROSCOPY, DUODENOSCOPY (EGD), COMBINED N/A 8/10/2017    Procedure: COMBINED ESOPHAGOSCOPY, GASTROSCOPY, DUODENOSCOPY (EGD), BIOPSY SINGLE OR MULTIPLE;  gastroscopy;  Surgeon: Heleen Bustamante MD;  Location:  GI     H ABLATION AV NODE  10/2012     HC COLONOSCOPY THRU STOMA, DIAGNOSTIC  ? 2005     IMPLANT PACEMAKER  10/2012    [place] [id number], [id number]     JOINT  REPLACEMTN, KNEE RT/LT      Joint Replacement knee bilateral     LAPAROSCOPIC CHOLECYSTECTOMY WITH CHOLANGIOGRAMS N/A 12/14/2015    Procedure: LAPAROSCOPIC CHOLECYSTECTOMY WITH CHOLANGIOGRAMS;  Surgeon: Ervin Amos MD;  Location:  OR     LUMPECTOMY BREAST      Left-2004     PHACOEMULSIFICATION CLEAR CORNEA WITH STANDARD INTRAOCULAR LENS IMPLANT Left 7/16/2015    Procedure: PHACOEMULSIFICATION CLEAR CORNEA WITH STANDARD INTRAOCULAR LENS IMPLANT;  Surgeon: Sai Obregon MD;  Location:  EC     PHACOEMULSIFICATION CLEAR CORNEA WITH TORIC INTRAOCULAR LENS IMPLANT Right 5/9/2017    Procedure: PHACOEMULSIFICATION CLEAR CORNEA WITH TORIC INTRAOCULAR LENS IMPLANT;  RIGHT EYE PHACOEMULSIFICATION CLEAR CORNEA WITH TORIC INTRAOCULAR LENS IMPLANT ;  Surgeon: Duc Richmond MD;  Location: [facility]       Anesthesia Evaluation     . Pt has had prior anesthetic.     No history of anesthetic complications          ROS/MED HX    ENT/Pulmonary:     (+)tobacco use, Past use 50pk year history packs/day  COPD, , . .   (-) sleep apnea   Neurologic:       Cardiovascular:     (+) hypertension--CAD, -past MI (NSTEMI),-. : . CHF Last EF: 55 . . pacemaker Reason placed: sp AVN ablation, 100% ventricular pacing, recently checked:type: [place] - Patient is dependent on pacemaker . . Previous cardiac testing Echodate:3/2018results:The visual ejection fraction is estimated at 50-55%. There is severe biatrial enlargement. There is moderate (2+) tricuspid regurgitation. Mild (35-45mmHg) pulmonary hypertension is present. The IVC is normal in size and reactivity with respiration, suggesting normal central venous pressure. The rhythm was atrial fibrillation with paced rhythm. Compared to the prior study dated 8/10/17, there have been no changes.Stress Testdate:5/2018 results:Impression  1. Myocardial perfusion imaging using single isotope technique demonstrated small area of apical ischemia.   2. Gated images demonstrated normal  "wall motion with a calculated ejection fraction of 61%.   3. Compared to the prior study from 2012 the apical ischemia appears to be new . date: results: date: results:          METS/Exercise Tolerance:     Hematologic:     (+) Anemia (hgb 7.7), -      Musculoskeletal:   (+) , , other musculoskeletal- hip surgery yesterday      GI/Hepatic:         Renal/Genitourinary:     (+) chronic renal disease (stage 3), type: CRI, Pt does not require dialysis,       Endo:     (+) type II DM Obesity (BMI 30), .      Psychiatric:     (+) psychiatric history anxiety and depression      Infectious Disease:         Malignancy:         Other:                                 Lab Results   Component Value Date    WBC 5.9 12/21/2018    HGB 7.7 (L) 12/21/2018    HCT 26.2 (L) 12/21/2018     12/21/2018    CRP 3.2 05/03/2016    SED 15 05/03/2016     12/21/2018    POTASSIUM 4.0 12/21/2018    CHLORIDE 104 12/21/2018    CO2 31 12/21/2018    BUN 32 (H) 12/21/2018    CR 1.41 (H) 12/21/2018     (H) 12/21/2018    GEARRD 9.6 12/21/2018    PHOS 4.1 11/19/2018    MAG 4.9 (H) 11/19/2018    ALBUMIN 2.8 (L) 12/19/2018    PROTTOTAL 6.5 (L) 12/19/2018    ALT 10 12/19/2018    AST 6 12/19/2018    ALKPHOS 82 12/19/2018    BILITOTAL 0.6 12/19/2018    LIPASE 78 07/18/2018    PTT 31 10/19/2018    INR 1.54 (H) 10/19/2018    TSH 3.62 12/19/2018    T4 1.34 10/01/2009       Preop Vitals  BP Readings from Last 3 Encounters:   12/21/18 117/49   12/19/18 104/56   11/19/18 102/59    Pulse Readings from Last 3 Encounters:   12/20/18 70   12/19/18 79   11/19/18 69      Resp Readings from Last 3 Encounters:   12/21/18 18   11/13/18 15   11/05/18 18    SpO2 Readings from Last 3 Encounters:   12/21/18 92%   12/19/18 96%   11/15/18 95%      Temp Readings from Last 1 Encounters:   12/21/18 36.1  C (97  F) (Oral)    Ht Readings from Last 1 Encounters:   12/19/18 1.651 m (5' 5\")      Wt Readings from Last 1 Encounters:   12/19/18 82.1 kg (181 lb)    Estimated " "body mass index is 30.12 kg/m  as calculated from the following:    Height as of an earlier encounter on 12/19/18: 1.651 m (5' 5\").    Weight as of an earlier encounter on 12/19/18: 82.1 kg (181 lb).                   Kavin Granados MD  "

## 2018-12-21 NOTE — PLAN OF CARE
A&Ox4. Up with SBA and walker. NPO for I&D today at 1320. Should have shower prior to this. +2 edema to BL. Numbness to BLE due to neuropathy. Dressing to L hip CDI. Continent of bowel and bladder. Plan for I&D today.

## 2018-12-21 NOTE — PROGRESS NOTES
[facility]    Hospitalist Progress Note      Assessment & Plan   Helene Gibbs is a 83 year old female who was admitted on 12/19/2018 for anemia seen on preoperative exam (hgb 6.6) for left hip I&D after left hip fracture with ORIF on 11/20/18 and persistent wound drainage.    Iron Deficiency Anemia  Hgb 6.6 on admission. Severe iron deficiency noted, gastro-ccult positive. Received 1 unit packed redblood cells and one dose of iv venofer. Hematoma seen in OR, EBL 50ml.  --GI consulted, patient refused repeat colonoscopy or esophagogastroduodenoscopy, recently had workup in past year  --plan for capsule endoscopy outpatient -she is ok with that   --monitor hemoglobin , no active profound bleeding noted.   - Hgb 7.7 morning of OR, repeat CBC in AM   - Remains on eliquis 2.5mg BID (dose on 12/22 AM to begin). Will need to trend Hgb on blood thinner.      Left Hip superficial infection  Greater trochanteric fracture  --Orthopedic surgery took patient for left hip I&D and excision of bone fragment (hematoma apparently seen during surgery)  - Orthopedic surgery requesting ID consult for wound infection (seems to spare the implant)  - posterior hip precautions, weight bearing as tolerated  - OT/PT consulted  - Clindamycin 900mg given intraoperatively and continued x2 doses post op.  - wound cultures collected    Abnormal urinalysis  UA with large leuk esterase, elevated WBCs and mucous. Cx with 10-50k gram negative rods  - Start ceftriaxone 1gm q24h. ID consulted, abx likely will change once hip cx return.      Type 2 diabetes mellitus with diabetic nephropathy (H)  --on glipizide at home  --continue moderate resistance sliding scale here with diabetic diet . BGs well controlled      CKD (chronic kidney disease) stage 3, GFR 30-59 ml/min (H)  --monitor creatinine, Was 1.76 on 12/19 improved to 1.41 this AM. Repeat BMP in AM. Receiving IVF overnight.      Pulmonary hypertension (H)    COPD (chronic  obstructive pulmonary disease) (H)  --prn nebs       Chronic atrial fib, S/P ablation -- on Eliquis  - Resuming eliquis on saturday  --continue metoprolol and statin     DVT Prophylaxis: eliquis to resume 12/22  Code Status: Prior  Expected discharge: few days pending course of infection, hemoglobin stability  Alondra Kelsey, DO  Text Page (7am - 6pm)    Interval History   Patient in pain immediately post-op. Cannot get comfortable in bed. Niece at bedside and provides update to patient about discussion with surgeon. Patient denies chest pain, shortness of breath, abdominal pain. Only pain at this time is left hip.    -Data reviewed today: I reviewed all new labs and imaging results over the last 24 hours. I personally reviewed post op pelvis/hip shows hardware in place and drain    Physical Exam   Temp: 96.8  F (36  C) Temp src: Oral BP: 122/58 Pulse: 70 Heart Rate: 70 Resp: 13 SpO2: 97 % O2 Device: Nasal cannula Oxygen Delivery: 4 LPM  There were no vitals filed for this visit.  Vital Signs with Ranges  Temp:  [96.4  F (35.8  C)-97  F (36.1  C)] 96.8  F (36  C)  Pulse:  [69-70] 70  Heart Rate:  [69-94] 70  Resp:  [9-45] 13  BP: (102-132)/(49-64) 122/58  SpO2:  [91 %-100 %] 97 %  I/O last 3 completed shifts:  In: 1240 [P.O.:240; I.V.:1000]  Out: -     Constitutional: Awake, alert, cooperative, moderate distress, constantly moving to try to get comfortable  Respiratory: Clear to auscultation bilaterally, no crackles or wheezing  Cardiovascular: Regular rate and rhythm, normal S1 and S2, and no murmur noted  GI: Normal bowel sounds, soft, non-distended, non-tender  Skin/Integumen: No rashes, no cyanosis, +1 left lower extremity edema. Left hip dressed.  Other:     Medications     lactated ringers 50 mL/hr at 12/21/18 1726       acetaminophen  975 mg Oral Q8H     [START ON 12/22/2018] apixaban ANTICOAGULANT  2.5 mg Oral BID     clindamycin  900 mg Intravenous Q8H     insulin aspart  1-7 Units Subcutaneous TID AC      insulin aspart  1-5 Units Subcutaneous At Bedtime     metoprolol tartrate  25 mg Oral BID     potassium chloride  20 mEq Oral Daily     pregabalin  75 mg Oral BID     ranitidine  150 mg Oral Daily     senna-docusate  2 tablet Oral BID     simvastatin  10 mg Oral At Bedtime     sodium chloride (PF)  3 mL Intracatheter Q8H     torsemide  20 mg Oral Daily     umeclidinium  1 puff Inhalation QPM       Data   Recent Labs   Lab 12/21/18  1012 12/21/18  0755 12/20/18  0726 12/19/18  2053 12/19/18  1133   WBC  --  5.9  --   --  5.0   HGB  --  7.7* 7.8*  --  6.6*   MCV  --  81  --   --  86   PLT  --  274  --   --  296     --   --  141 143   POTASSIUM 4.0  --   --  4.1 4.2   CHLORIDE 104  --   --  104 106   CO2 31  --   --  29 30   BUN 32*  --   --  35* 33*   CR 1.41*  --   --  1.76* 1.58*   ANIONGAP 9  --   --  8 7   GERARD 9.6  --   --  9.0 9.7   *  --   --  223* 143*   ALBUMIN  --   --   --  2.8* 3.1*   PROTTOTAL  --   --   --  6.5* 7.1   BILITOTAL  --   --   --  0.6 0.6   ALKPHOS  --   --   --  82 74   ALT  --   --   --  10 9   AST  --   --   --  6 6       Recent Results (from the past 24 hour(s))   XR Pelvis w Hip Port Left 1 View    Narrative    PORTABLE ONE VIEW PELVIS AND ONE VIEW LEFT HIP 12/21/2018 4:03 PM     HISTORY: Postoperative evaluation of the left hip.    COMPARISON: 10/20/2018    FINDINGS: There are surgical changes of a left hip bipolar  arthroplasty. The hardware is intact with no fracture or other  complication seen. Lateral skin staples are seen. A soft tissue drain  is present. Again seen is calcification in the vascular structures and  degenerative changes of the right hip. No other abnormality is  demonstrated.      Impression    IMPRESSION: Unremarkable postoperative appearance of the left hip.    SAMANTHA BANUELOS MD

## 2018-12-21 NOTE — ANESTHESIA PREPROCEDURE EVALUATION
Anesthesia Pre-Procedure Evaluation    Patient: Helene Gibbs   MRN: [id number] : 1935          Preoperative Diagnosis: LEFT HIP INFECTION STATUS POST LEFT HIP ARTHROPLASTY    Procedure(s):  INCISION AND DRAINAGE LEFT HIP    Past Medical History:   Diagnosis Date     Anemia      Ankle arthritis      Arthritis      Back pain      Bell's palsy     left     Breast CA (H) -    left lumpectomy, radiation, -recurrence     Breast cancer (H)     Left breast lumpectomy w LN disection, and radiation therapy     CKD (chronic kidney disease)     stage 3 baseline Cr 1.3     Colon polyps     colonoscopy--next due in      Congestive heart failure (H)      COPD (chronic obstructive pulmonary disease) (H)      Coronary artery disease      DM (diabetes mellitus) (H)      GERD (gastroesophageal reflux disease)      Hyperlipidemia      Hypertension      Lumbar spinal stenosis     use cane     Nicotine dependence      Obesity      Osteoporosis      Other chronic pain      Pacemaker      Permanent atrial fibrillation (H) 2008    S/P AV node ablation and pacemaker 10-25-12       Pleural effusion      Pulmonary hypertension (H)      PVD (peripheral vascular disease) (H)      Rectal stenosis      Tobacco abuse     current     Tricuspid regurgitation      Past Surgical History:   Procedure Laterality Date     ARTHROPLASTY HIP Left 10/20/2018    Procedure: LEFT HIP HERACLIO-ARTHROPLASTY ;  Surgeon: Ish Fish MD;  Location:  OR     ARTHROSCOPY SHOULDER RT/LT  ,     Bilateral, right then left     BONE MARROW BIOPSY, BONE SPECIMEN, NEEDLE/TROCAR N/A 2017    Procedure: BIOPSY BONE MARROW;  BONE MARROW BIOPSY;  Surgeon: Marino Cohen MD;  Location: [facility]     C DEXA, BONE DENSITY, AXIAL SKEL       C MAMMOGRAM, SCREENING  2009     COLONOSCOPY N/A 10/7/2016    Procedure: COMBINED COLONOSCOPY, SINGLE OR MULTIPLE BIOPSY/POLYPECTOMY BY BIOPSY;  Surgeon: Cassandra Mccord MD;  Location:  GI      ESOPHAGOSCOPY, GASTROSCOPY, DUODENOSCOPY (EGD), COMBINED N/A 8/10/2017    Procedure: COMBINED ESOPHAGOSCOPY, GASTROSCOPY, DUODENOSCOPY (EGD), BIOPSY SINGLE OR MULTIPLE;  gastroscopy;  Surgeon: Helene Bustamante MD;  Location:  GI     H ABLATION AV NODE  10/2012     HC COLONOSCOPY THRU STOMA, DIAGNOSTIC  ? 2005     IMPLANT PACEMAKER  10/2012    [place] [id number], [id number]     JOINT REPLACEMTN, KNEE RT/LT      Joint Replacement knee bilateral     LAPAROSCOPIC CHOLECYSTECTOMY WITH CHOLANGIOGRAMS N/A 12/14/2015    Procedure: LAPAROSCOPIC CHOLECYSTECTOMY WITH CHOLANGIOGRAMS;  Surgeon: Ervin Amos MD;  Location:  OR     LUMPECTOMY BREAST      Left-2004     PHACOEMULSIFICATION CLEAR CORNEA WITH STANDARD INTRAOCULAR LENS IMPLANT Left 7/16/2015    Procedure: PHACOEMULSIFICATION CLEAR CORNEA WITH STANDARD INTRAOCULAR LENS IMPLANT;  Surgeon: Sai Obregon MD;  Location:  [facility]     PHACOEMULSIFICATION CLEAR CORNEA WITH TORIC INTRAOCULAR LENS IMPLANT Right 5/9/2017    Procedure: PHACOEMULSIFICATION CLEAR CORNEA WITH TORIC INTRAOCULAR LENS IMPLANT;  RIGHT EYE PHACOEMULSIFICATION CLEAR CORNEA WITH TORIC INTRAOCULAR LENS IMPLANT ;  Surgeon: Duc Richmond MD;  Location:  [facility]       Anesthesia Evaluation     . Pt has had prior anesthetic.     No history of anesthetic complications          ROS/MED HX    ENT/Pulmonary:     (+)tobacco use, Past use 50pk years packs/day  COPD, , . Other pulmonary disease dental abscess on antibiotics.   (-) sleep apnea   Neurologic:       Cardiovascular:     (+) hypertension--CAD, -past MI (NSTEMI),-. : . CHF . . pacemaker Reason placed: sp AVN ablation, 100% paced, checked recently:type: [place] settings: Vpacing - Patient is dependent on pacemaker . . pulmonary hypertension (mild), Previous cardiac testing Echodate:3/2018results:Interpretation Summary     The visual ejection fraction is estimated at 50-55%. There is severe biatrial enlargement. There is moderate (2+)  tricuspid regurgitation. Mild (35-45mmHg) pulmonary hypertension is present. The IVC is normal in size and reactivity with respiration, suggesting normal central venous pressure. The rhythm was atrial fibrillation with paced rhythm. Compared to the prior study dated 8/10/17, there have been no changes.date: results: date: results: date: results:          METS/Exercise Tolerance:     Hematologic:     (+) Anemia (Hgb 7.7), -      Musculoskeletal:         GI/Hepatic:     (+) GERD Asymptomatic on medication,       Renal/Genitourinary:     (+) chronic renal disease (stage 3), type: CRI,       Endo:     (+) type II DM Diabetic complications: retinopathy, Obesity (BMI 30), .      Psychiatric:     (+) psychiatric history anxiety and depression      Infectious Disease:         Malignancy:         Other:                          Physical Exam  Normal systems: cardiovascular and pulmonary    Airway   Mallampati: II  TM distance: >3 FB  Neck ROM: limited    Dental   (+) missing and partials    Cardiovascular       Pulmonary             Lab Results   Component Value Date    WBC 5.9 12/21/2018    HGB 7.7 (L) 12/21/2018    HCT 26.2 (L) 12/21/2018     12/21/2018    CRP 3.2 05/03/2016    SED 15 05/03/2016     12/21/2018    POTASSIUM 4.0 12/21/2018    CHLORIDE 104 12/21/2018    CO2 31 12/21/2018    BUN 32 (H) 12/21/2018    CR 1.41 (H) 12/21/2018     (H) 12/21/2018    GERARD 9.6 12/21/2018    PHOS 4.1 11/19/2018    MAG 4.9 (H) 11/19/2018    ALBUMIN 2.8 (L) 12/19/2018    PROTTOTAL 6.5 (L) 12/19/2018    ALT 10 12/19/2018    AST 6 12/19/2018    ALKPHOS 82 12/19/2018    BILITOTAL 0.6 12/19/2018    LIPASE 78 07/18/2018    PTT 31 10/19/2018    INR 1.54 (H) 10/19/2018    TSH 3.62 12/19/2018    T4 1.34 10/01/2009       Preop Vitals  BP Readings from Last 3 Encounters:   12/21/18 117/49   12/19/18 104/56   11/19/18 102/59    Pulse Readings from Last 3 Encounters:   12/20/18 70   12/19/18 79   11/19/18 69      Resp Readings  "from Last 3 Encounters:   12/21/18 18   11/13/18 15   11/05/18 18    SpO2 Readings from Last 3 Encounters:   12/21/18 92%   12/19/18 96%   11/15/18 95%      Temp Readings from Last 1 Encounters:   12/21/18 36.1  C (97  F) (Oral)    Ht Readings from Last 1 Encounters:   12/19/18 1.651 m (5' 5\")      Wt Readings from Last 1 Encounters:   12/19/18 82.1 kg (181 lb)    Estimated body mass index is 30.12 kg/m  as calculated from the following:    Height as of an earlier encounter on 12/19/18: 1.651 m (5' 5\").    Weight as of an earlier encounter on 12/19/18: 82.1 kg (181 lb).       Anesthesia Plan      History & Physical Review  History and physical reviewed and following examination; no interval change.    ASA Status:  4 .    NPO Status:  > 8 hours    Plan for General and ETT with Intravenous induction. Maintenance will be Balanced.    PONV prophylaxis:  Ondansetron (or other 5HT-3)  Additional equipment: Videolaryngoscope BS checked in preop and 150's.  Can check next in PACU given no DM meds today.  Pacemaker available, but will not place unless needed  T&S available, will transfuse 1U PRBC if blood pressure problems.  Starting Hgb 7.7        Postoperative Care  Postoperative pain management:  IV analgesics.      Consents  Anesthetic plan, risks, benefits and alternatives discussed with:  Patient..                 Kavin Granados MD  "

## 2018-12-22 NOTE — PROGRESS NOTES
S:  Patient ripped out her IV overnight due to nightmare. Pain control adequate.    O:  BP 97/47   Pulse 62   Temp 96.3  F (35.7  C) (Axillary)   Resp 16   LMP  (LMP Unknown)   SpO2 97%       LLE:  Incisional wound vac in place  +DF/PF  SILT    A/P:  83 year old female s/p L hip hemiarthroplasty I&D and bone excision    - WBAT   - ID consult for abx, in meantime continue IV cleocin  - dvt prophylaxis with lovenox while in hospital, transition to aspirin upon discharge  - pain control  - PT/OT  - Dispo planning

## 2018-12-22 NOTE — CONSULTS
[facility]    Infectious Disease Consultation     Date of Admission:  12/19/2018  Date of Consult (When I saw the patient): 12/22/18    Assessment & Plan   Helene Gibbs is a 83 year old female who was admitted on 12/19/2018.     Impression:  1. 83 y.o female with history of left hip fracture, s/p left hip edison arthroplasty done on 11/20.   2. Admitted this occasion with persistent drainage from the wound.   3. No fevers or chills.   4. S/p I and D of the left hip, no deep infection suspected.   5. UA pos, UC with E coli and E faecalis.   6. On ceftriaxone and vancomycin.   7. PCN listed as allergy, but patient tells me she has tolerated and taken amoxi and Augmentin various times in past. The reaction listed in the chart is hives but patient remembers rash when she was is in 20`s.     Recommendations:   Zosyn for now. Covers the urine, stop clinda and ceftriaxone.   Follow up on the OR cultures.     Jai Monzon MD    Reason for Consult   Reason for consult: I was asked by Dr. Fish  to evaluate this patient for Post operative infection.    Primary Care Physician   Neisha Esparza    Chief Complaint   Draining incision    History is obtained from the patient and medical records    History of Present Illness   Helene Gibbs is a 83 year old female with DM type 2, COPD with pulm htn, CRF stage 3 s/p  left hip I & D after Left hip fracture with edison arthroplasty  11/20/18 and persistent wound drainage:          Past Medical History   I have reviewed this patient's medical history and updated it with pertinent information if needed.   Past Medical History:   Diagnosis Date     Anemia      Ankle arthritis      Arthritis      Back pain      Bell's palsy 9/08    left     Breast CA (H) 2004-    left lumpectomy, radiation, -recurrence     Breast cancer (H) 2004    Left breast lumpectomy w LN disection, and radiation therapy     CKD (chronic kidney disease)     stage 3 baseline Cr 1.3     Colon polyps      colonoscopy-9/12-next due in 9/13     Congestive heart failure (H)      COPD (chronic obstructive pulmonary disease) (H)      Coronary artery disease      DM (diabetes mellitus) (H)      GERD (gastroesophageal reflux disease)      Hyperlipidemia      Hypertension      Lumbar spinal stenosis     use cane     Nicotine dependence      Obesity      Osteoporosis      Other chronic pain      Pacemaker      Permanent atrial fibrillation (H) 8/2008    S/P AV node ablation and pacemaker 10-25-12       Pleural effusion      Pulmonary hypertension (H)      PVD (peripheral vascular disease) (H)      Rectal stenosis      Tobacco abuse     current     Tricuspid regurgitation        Past Surgical History   I have reviewed this patient's surgical history and updated it with pertinent information if needed.  Past Surgical History:   Procedure Laterality Date     ARTHROPLASTY HIP Left 10/20/2018    Procedure: LEFT HIP HERACLIO-ARTHROPLASTY ;  Surgeon: Ish Fish MD;  Location:  OR     ARTHROSCOPY SHOULDER RT/LT  1999, 2004    Bilateral, right then left     BONE MARROW BIOPSY, BONE SPECIMEN, NEEDLE/TROCAR N/A 8/29/2017    Procedure: BIOPSY BONE MARROW;  BONE MARROW BIOPSY;  Surgeon: Marino Cohen MD;  Location:  GI     C DEXA, BONE DENSITY, AXIAL SKEL       C MAMMOGRAM, SCREENING  1/2009     COLONOSCOPY N/A 10/7/2016    Procedure: COMBINED COLONOSCOPY, SINGLE OR MULTIPLE BIOPSY/POLYPECTOMY BY BIOPSY;  Surgeon: Cassandra Mccord MD;  Location:  GI     ESOPHAGOSCOPY, GASTROSCOPY, DUODENOSCOPY (EGD), COMBINED N/A 8/10/2017    Procedure: COMBINED ESOPHAGOSCOPY, GASTROSCOPY, DUODENOSCOPY (EGD), BIOPSY SINGLE OR MULTIPLE;  gastroscopy;  Surgeon: Helene Bustamante MD;  Location:  GI     H ABLATION AV NODE  10/2012     HC COLONOSCOPY THRU STOMA, DIAGNOSTIC  ? 2005     IMPLANT PACEMAKER  10/2012    [place] [id number], [id number]     JOINT REPLACEMTN, KNEE RT/LT      Joint Replacement knee bilateral     LAPAROSCOPIC  CHOLECYSTECTOMY WITH CHOLANGIOGRAMS N/A 2015    Procedure: LAPAROSCOPIC CHOLECYSTECTOMY WITH CHOLANGIOGRAMS;  Surgeon: Ervin Amos MD;  Location:  OR     LUMPECTOMY BREAST      Left-     PHACOEMULSIFICATION CLEAR CORNEA WITH STANDARD INTRAOCULAR LENS IMPLANT Left 2015    Procedure: PHACOEMULSIFICATION CLEAR CORNEA WITH STANDARD INTRAOCULAR LENS IMPLANT;  Surgeon: Sai Obregon MD;  Location:  EC     PHACOEMULSIFICATION CLEAR CORNEA WITH TORIC INTRAOCULAR LENS IMPLANT Right 2017    Procedure: PHACOEMULSIFICATION CLEAR CORNEA WITH TORIC INTRAOCULAR LENS IMPLANT;  RIGHT EYE PHACOEMULSIFICATION CLEAR CORNEA WITH TORIC INTRAOCULAR LENS IMPLANT ;  Surgeon: Duc Richmond MD;  Location:  EC       Prior to Admission Medications   Prior to Admission Medications   Prescriptions Last Dose Informant Patient Reported? Taking?   ACCU-CHEK SMARTVIEW test strip   No No   Sig: USE 1 STRIP BY IN VITRO ROUTE 2 TIMES DAILY OR AS DIRECTED   ACE/ARB/ARNI NOT PRESCRIBED (INTENTIONAL)   Yes No   Sig: Please choose reason not prescribed, below   ASPIRIN PO 2018 at on hold  Yes Yes   Sig: Take 81 mg by mouth daily   CLINDAMYCIN HCL PO prn  Yes No   Sig: Give 300 mg by mouth as needed for Dental Infection Prophylaxis Give 1 tablet (300mg) 4 times daily as needed. To be given the day prior to dental procedure   ELIQUIS 2.5 MG tablet 2018 at Unknown time  Yes Yes   Si.5 mg 2 times daily    GlipiZIDE (GLUCOTROL PO) 2018 at am Self Yes Yes   Sig: Take 5 mg by mouth every morning (before breakfast)   Lidocaine (LIDOCARE) 4 % Patch  Self Yes No   Sig: Place 3 patches onto the skin daily as needed for moderate pain   albuterol (PROAIR HFA, PROVENTIL HFA, VENTOLIN HFA) 108 (90 BASE) MCG/ACT inhaler prn at not using Self No No   Sig: Inhale 2 puffs into the lungs every 4 hours as needed for shortness of breath / dyspnea or wheezing   bisacodyl (DULCOLAX) 5 MG EC tablet prn  Yes Yes    Sig: Take 5-10 mg by mouth daily as needed for constipation   blood glucose monitoring (NO BRAND SPECIFIED) meter device kit   No No   Sig: Accucheck Mariza meter; verified with pharmacist   denosumab (PROLIA) 60 MG/ML SOLN injection due  No No   Sig: Inject 1 mL (60 mg) Subcutaneous every 6 months   fluticasone (FLONASE) 50 MCG/ACT spray prn Self Yes No   Sig: Spray 1-2 sprays into both nostrils daily as needed for rhinitis or allergies   hydrOXYzine (VISTARIL) 25 MG capsule prn Self No No   Sig: Take 1 capsule (25 mg) by mouth 2 times daily as needed for itching   magnesium hydroxide (MILK OF MAGNESIA) 400 MG/5ML suspension Past Week at Unknown time  Yes Yes   Sig: Take by mouth daily as needed for constipation or heartburn   metoprolol tartrate (LOPRESSOR) 25 MG tablet [id number] Self No No   Sig: Take 1 tablet (25 mg) by mouth 2 times daily   ondansetron (ZOFRAN ODT) 4 MG ODT tab prn Self No No   Sig: Take 1 tablet (4 mg) by mouth every 6 hours as needed for nausea   potassium chloride (KLOR-CON) 20 MEQ Packet 12/19/2018 at Unknown time  Yes Yes   Sig: Take 20 mEq by mouth daily   pregabalin (LYRICA) 75 MG capsule 12/19/2018 at [id number] Self No Yes   Sig: Take 1 capsule (75 mg) by mouth 2 times daily   ranitidine (ZANTAC) 150 MG tablet 12/19/2018 at [id number] Self No Yes   Sig: TAKE ONE TABLET BY MOUTH TWO TIMES A DAY   simvastatin (ZOCOR) 10 MG tablet 12/19/2018 at 1600  No Yes   Sig: Take 1 tablet (10 mg) by mouth At Bedtime   tiotropium (SPIRIVA) 18 MCG capsule 12/19/2018 at 1600 Self No Yes   Sig: Inhale 1 capsule (18 mcg) into the lungs every evening   torsemide (DEMADEX) 20 MG tablet 12/18/2018 at Unknown time  No Yes   Sig: Take 1 tablet (20 mg) by mouth daily      Facility-Administered Medications: None     Allergies   Allergies   Allergen Reactions     Ambien [Zolpidem Tartrate] Visual Disturbance     Hallucinations and fell out of bed at night.     Penicillins Hives     Definity      Caused a syncopal  episode.     Sulfa Drugs Itching     Cymbalta Rash     Fluconazole Rash       Immunization History   Immunization History   Administered Date(s) Administered     Influenza (H1N1) 2009     Influenza (High Dose) 3 valent vaccine 10/01/2013, 2014, 10/27/2015, 2016, 10/16/2017, 10/16/2018     Influenza (IIV3) PF 2009, 10/05/2010, 10/04/2011, 10/19/2012     Pneumo Conj 13-V (2010&after) 2015     Pneumococcal 23 valent 2010     TD (ADULT, 7+) 2010     Zoster vaccine recombinant adjuvanted (SHINGRIX) 2018     Zoster vaccine, live 2009       Social History   I have reviewed this patient's social history and updated it with pertinent information if needed. Helene Gibbs  reports that she quit smoking about 6 months ago. Her smoking use included cigarettes. She has a 11.25 pack-year smoking history. she has never used smokeless tobacco. She reports that she does not drink alcohol or use drugs.    Family History   I have reviewed this patient's family history and updated it with pertinent information if needed.   Family History   Problem Relation Age of Onset     Hypertension Mother      Diabetes Mother          at 83 yrs     C.A.D. Father          age 70s     Breast Cancer No family hx of      Colon Cancer No family hx of        Review of Systems   The 10 point Review of Systems is negative other than noted in the HPI or here.     Physical Exam   Temp: 96.3  F (35.7  C) Temp src: Axillary BP: 97/47 Pulse: 62 Heart Rate: 69 Resp: 16 SpO2: 97 % O2 Device: Nasal cannula Oxygen Delivery: 3 LPM  Vital Signs with Ranges  Temp:  [95  F (35  C)-96.8  F (36  C)] 96.3  F (35.7  C)  Pulse:  [62-72] 62  Heart Rate:  [69-73] 69  Resp:  [9-45] 16  BP: ()/(43-64) 97/47  SpO2:  [96 %-100 %] 97 %  0 lbs 0 oz  There is no height or weight on file to calculate BMI.    GENERAL APPEARANCE:  Awake   EYES: Eyes grossly normal to inspection, PERRL and conjunctivae and sclerae  normal  HENT: ear canals and TM's normal and nose and mouth without ulcers or lesions  NECK: no adenopathy, no asymmetry, masses, or scars and thyroid normal to palpation  RESP: lungs clear to auscultation - no rales, rhonchi or wheezes  CV: regular rates and rhythm, normal S1 S2, no S3 or S4 and no murmur, click or rub  LYMPHATICS: normal ant/post cervical and supraclavicular nodes  ABDOMEN: soft, nontender, without hepatosplenomegaly or masses and bowel sounds normal  MS: extremities normal- no gross deformities noted  SKIN: no suspicious lesions or rashes      Data   Lab Results   Component Value Date    WBC 6.4 12/22/2018    HGB 7.0 (L) 12/22/2018    HCT 24.3 (L) 12/22/2018     12/22/2018     12/22/2018    POTASSIUM 3.9 12/22/2018    CHLORIDE 105 12/22/2018    CO2 33 (H) 12/22/2018    BUN 30 12/22/2018    CR 1.89 (H) 12/22/2018     (H) 12/22/2018    SED 15 05/03/2016    DD 0.9 (H) 05/22/2018    NTBNPI 6,532 (H) 07/16/2018    NTBNP 3,403 (H) 06/20/2018    TROPI <0.015 07/21/2018    AST 6 12/19/2018    ALT 10 12/19/2018    ALKPHOS 82 12/19/2018    BILITOTAL 0.6 12/19/2018    INR 1.54 (H) 10/19/2018     Recent Labs   Lab 12/21/18  1402 12/19/18  2230   CULT Culture negative monitoring continues  PENDING 10,000 to 50,000 colonies/mL  Escherichia coli  Additional susceptibilities in progress  12/22/18  *  10,000 to 50,000 colonies/mL  Enterococcus faecalis  Susceptibility testing in progress  *     Recent Labs   Lab Test 12/21/18  1402 12/19/18  2230 10/23/18  0911 10/22/18  2300 10/19/18  2300 07/16/18  1740 04/16/18  1352 04/06/18  0820 10/19/16  2035   CULT Culture negative monitoring continues  PENDING 10,000 to 50,000 colonies/mL  Escherichia coli  Additional susceptibilities in progress  12/22/18  *  10,000 to 50,000 colonies/mL  Enterococcus faecalis  Susceptibility testing in progress  * Light growth  Escherichia coli  *  Light growth  Strain 2  Escherichia coli  *  Plus  Light  growth  Normal pal   No growth <10,000 colonies/mL  Escherichia coli  * No growth >100,000 colonies/mL  Escherichia coli  * 10,000 to 50,000 colonies/mL  mixed urogenital pal   10,000 to 50,000 colonies/mL mixed urogenital pal Susceptibility testing not   routinely done

## 2018-12-22 NOTE — PROGRESS NOTES
12/22/18 0800   Quick Adds   Type of Visit Initial PT Evaluation   Living Environment   Lives With alone   Living Arrangements assisted living   Home Accessibility no concerns   Living Environment Comment Assistance with cleaning, bathing. Has had outside services come in and help with in unit mobility before   Self-Care   Usual Activity Tolerance fair   Current Activity Tolerance poor   Equipment Currently Used at Home walker, rolling   Activity/Exercise/Self-Care Comment Had been participating in HH PT   Functional Level Prior   Ambulation 1-->assistive equipment   Transferring 1-->assistive equipment   Toileting 1-->assistive equipment   Bathing 3-->assistive equipment and person   Fall history within last six months yes   Number of times patient has fallen within last six months (Reports none, fall lead to initial CHARLIE)   Which of the above functional risks had a recent onset or change? ambulation;transferring   Prior Functional Level Comment Uses FWW for all mobility   General Information   Onset of Illness/Injury or Date of Surgery - Date 12/19/18   Referring Physician Ish Fish MD   Patient/Family Goals Statement Return to nursing home   Pertinent History of Current Problem (include personal factors and/or comorbidities that impact the POC) POD1 L hip CHARLIE I&D   Precautions/Limitations fall precautions;left hip precautions   Weight-Bearing Status - LLE weight-bearing as tolerated   Cognitive Status Examination   Orientation orientation to person, place and time   Level of Consciousness alert   Pain Assessment   Patient Currently in Pain Yes, see Vital Sign flowsheet   Posture    Posture Comments Very kyphotic and flexed   Range of Motion (ROM)   ROM Comment LLE limited by pain otherwise WFL   Strength   Strength Comments NT   Bed Mobility   Bed Mobility Comments Mod-A supine>sit, max-A sit>supine   Transfer Skills   Transfer Comments CGA sit<>stand   Gait   Gait Comments CGA with FWW, unable to fully step-to  "  Balance   Balance Comments Requires UE support   Sensory Examination   Sensory Perception Comments Denies NT   Modality Interventions   Planned Modality Interventions Cryotherapy   General Therapy Interventions   Planned Therapy Interventions bed mobility training;gait training;neuromuscular re-education;strengthening;transfer training;progressive activity/exercise;home program guidelines;risk factor education   Clinical Impression   Criteria for Skilled Therapeutic Intervention yes, treatment indicated   PT Diagnosis Impaired gait and mobility   Influenced by the following impairments Pain, posterior hip precautions   Functional limitations due to impairments Decreased safety and IND with mobility   Clinical Presentation Stable/Uncomplicated   Clinical Presentation Rationale Medically stable   Clinical Decision Making (Complexity) Low complexity   Therapy Frequency` 2 times/day   Predicted Duration of Therapy Intervention (days/wks) 3 days   Anticipated Discharge Disposition Transitional Care Facility   Risk & Benefits of therapy have been explained Yes   Patient, Family & other staff in agreement with plan of care Yes   [facility] TM \"6 Clicks\"   2016, Trustees of [facility], under license to [employer].  All rights reserved.   6 Clicks Short Forms Basic Mobility Inpatient Short Form   [facility]  \"6 Clicks\" V.2 Basic Mobility Inpatient Short Form   1. Turning from your back to your side while in a flat bed without using bedrails? 3 - A Little   2. Moving from lying on your back to sitting on the side of a flat bed without using bedrails? 2 - A Lot   3. Moving to and from a bed to a chair (including a wheelchair)? 2 - A Lot   4. Standing up from a chair using your arms (e.g., wheelchair, or bedside chair)? 3 - A Little   5. To walk in hospital room? 3 - A Little   6. Climbing 3-5 steps with a railing? 1 - Total   Basic Mobility Raw Score (Score out of 24.Lower scores equate " to lower levels of function) 14   Total Evaluation Time   Total Evaluation Time (Minutes) 7

## 2018-12-22 NOTE — PLAN OF CARE
Lethargic, slightly disoriented when first awoken. BP soft, asymptomatic. Other VSS. C/o pain after ambulation to BR, scheduled tylenol given along with PRN oxycodone with effect. Up with A1 and walker. Wound VAC in place on left hip, unable to assess incision.  CMS intact. Abductor pillow in place while in bed. Plan for ID consult today.

## 2018-12-22 NOTE — PLAN OF CARE
PT: Pt refusing PM tx. She states 8am this morning was too early and 4:30pm today is too late. Reports being up to the bathroom, but not agreeable to participate is therapy. Will decrease frequency to daily due to activity tolerance and TCU discharge.

## 2018-12-22 NOTE — PROGRESS NOTES
12/22/18 1400   Quick Adds   Type of Visit Initial Occupational Therapy Evaluation   Living Environment   Lives With alone   Living Arrangements assisted living   Home Accessibility no concerns   Living Environment Comment Assistance with cleaning, bathing. Has had outside services come in and help with in-unit mobility before   Self-Care   Usual Activity Tolerance moderate   Current Activity Tolerance poor   Equipment Currently Used at Home walker, rolling   Activity/Exercise/Self-Care Comment Had been participating in HH PT   General Information   Onset of Illness/Injury or Date of Surgery - Date 12/19/18   Referring Physician Had been participating in  PT   Patient/Family Goals Statement Pt would like to discharge to home   Additional Occupational Profile Info/Pertinent History of Current Problem Pt lives alone and has for some time, does not drive. Likes attending exercise classes in her building.   Precautions/Limitations left hip precautions;fall precautions   Weight-Bearing Status - LLE weight-bearing as tolerated   Cognitive Status Examination   Orientation orientation to person, place and time   Level of Consciousness alert   Follows Commands (Cognition) follows two step commands   Cognitive Comment Cogntition not formally assessed, OT to continue to monitor   Visual Perception   Visual Perception Comments Pt was reporting blurry vision, likely due to high blood pressure   Sensory Examination   Sensory Comments Pt has neuropathy at baseline   Pain Assessment   Patient Currently in Pain Yes, see Vital Sign flowsheet  (back and neck)   Range of Motion (ROM)   ROM Comment LUE ROM is WFL, R shoulder movement is restricted by old rotator cuff injury   Strength   Strength Comments L shoulder NT, all other BUE strength is WFL   Transfer Skill: Sit to Stand   Level of [place]: Sit/Stand contact guard   Physical Assist/Nonphysical Assist: Sit/Stand set-up required;supervision;verbal cues;1 person assist    Transfer Skill: Sit to Stand weight-bearing as tolerated   Assistive Device for Transfer: Sit/Stand rolling walker   Transfer Skill: Toilet Transfer   Level of [place]: Toilet contact guard   Tub/Shower Transfer   Tub/Shower Transfer Comments Pt has walk in shower with grab bars at home   Balance   Balance Comments No LOB noted   Upper Body Dressing   Level of [place]: Dress Upper Body minimum assist (75% patients effort)   Lower Body Dressing   Level of [place]: Dress Lower Body maximum assist (25% patients effort)   Toileting   Level of [place]: Toilet independent   Grooming   Level of [place]: Grooming minimum assist (75% patients effort)   Eating/Self Feeding   Level of [place]: Eating independent   Instrumental Activities of Daily Living (IADL)   Previous Responsibilities housekeeping;laundry;medication management   IADL Comments Light housekeeping, gets help for 2 hrs/week, including bathing and cleaning   Activities of Daily Living Analysis   Impairments Contributing to Impaired Activities of Daily Living post surgical precautions;pain  (deconditioning)   General Therapy Interventions   Planned Therapy Interventions ADL retraining   Clinical Impression   Criteria for Skilled Therapeutic Interventions Met yes, treatment indicated   OT Diagnosis Decreased safety and I with ADL tasks   Influenced by the following impairments Pt is limited by hip precautions, pain, and deconditioning   Assessment of Occupational Performance 3-5 Performance Deficits   Identified Performance Deficits Pt is functioning below baseline in toileting, dressing, g/h tasks, and all IADLs   Clinical Decision Making (Complexity) Low complexity   Therapy Frequency daily   Predicted Duration of Therapy Intervention (days/wks) 5 days   Anticipated Discharge Disposition Transitional Care Facility;Home with Home Therapy   Risks and Benefits of Treatment have been explained. Yes   Patient, Family & other staff in  "agreement with plan of care Yes   Clinical Impression Comments Skilled OT intervention is warranted to address presenting deficits   [facility] AM-PAC  \"6 Clicks\" Daily Activity Inpatient Short Form   1. Putting on and taking off regular lower body clothing? 2 - A Lot   2. Bathing (including washing, rinsing, drying)? 2 - A Lot   3. Toileting, which includes using toilet, bedpan or urinal? 3 - A Little   4. Putting on and taking off regular upper body clothing? 3 - A Little   5. Taking care of personal grooming such as brushing teeth? 3 - A Little   6. Eating meals? 4 - None   Daily Activity Raw Score (Score out of 24.Lower scores equate to lower levels of function) 17   Total Evaluation Time   Total Evaluation Time (Minutes) 10     "

## 2018-12-22 NOTE — PROGRESS NOTES
[facility]    Hospitalist Progress Note      Assessment & Plan      Helene Gibbs is a 83 year old female with DM type 2, COPD with pulm htn, CKD stage 3 who presents with anemia on preop exam today -- hgb 6.6 and preop for left hip I & D after Left hip fracture with ORIF 11/20/18 and persistent wound drainage:     Iron Deficiency Anemia  Hgb 6.6 on admission. Severe iron deficiency noted, gastro-ccult positive. Received 1 unit packed redblood cells and one dose of iv venofer. Hematoma seen in OR, EBL 50ml.  --GI consulted, patient refused repeat colonoscopy or esophagogastroduodenoscopy, recently had workup in past year  --plan for capsule endoscopy outpatient -she is ok with that   --monitor hemoglobin , no active profound bleeding noted.   - Hgb 7.0 this morning and hypotensive, will transfuse her 1 unit of Pack RBC, start her on oral iron sulfate 325 mg every other day.  - Remains on eliquis 2.5mg BID (dose on 12/22 AM to begin). Will need to trend Hgb on blood thinner.      Left Hip superficial infection  Greater trochanteric fracture  --Orthopedic surgery took patient for left hip I&D and excision of bone fragment (hematoma apparently seen during surgery)  - Orthopedic surgery requesting ID consult for wound infection (seems to spare the implant)  - posterior hip precautions, weight bearing as tolerated  - OT/PT consulted  - ID consulted and evaluated the patient and started her on IV Zosyn, which is appropriate, will keep an eye on any allergic  Reaction.     UTI  UA with large leuk esterase, elevated WBCs and mucous. Cx growing E coli and Enterococcus fecalis  Now started on IV Zosyn.         Type 2 diabetes mellitus with diabetic nephropathy (H)  --on glipizide at home  --continue moderate resistance sliding scale here with diabetic diet . BGs well controlled      CKD (chronic kidney disease) stage 3, GFR 30-59 ml/min (H)  --monitor creatinine, Was 1.76 on 12/19 improved to 1.41 this AM.    Overall her creatinine is at her baseline.       Pulmonary hypertension (H)    COPD (chronic obstructive pulmonary disease) (H)  --prn nebs       Chronic atrial fib, S/P ablation -- on Eliquis  - Resuming eliquis on saturday  --continue metoprolol and statin     - Hypotension.  Will transfuse 1 unit of Pack of RBC today.   Give dose of midodrine 2.5 mg once today.  She does get low blood at home as well.     DVT Prophylaxis: eliquis to resume 12/22  Code Status: Prior  Expected discharge: few days pending course of infection, hemoglobin stability    Transfuse her unit of 1 Pack RBC today  Agree to hold metoprolol and torsemide dose for now  Give dose of Midodrine 2.5 mg X 1 now.     Discussed with the nursing staff taking care of the patient today     Kelvin Spencer MD  Text Page (7am - 6pm)    Interval History   Has some pain to her hip, denies any fever, chills, chest pain, SOB, headache or dizziness.   Told me that her blood pressure runs low at home and if it is less than 100 she don't take her  Blood pressure medications.     Blood pressure running on lower side this morning.     -Data reviewed today: I reviewed all new labs and imaging results over the last 24 hours. I personally reviewed post op pelvis/hip shows hardware in place and drain    Physical Exam   Temp: 97.9  F (36.6  C) Temp src: Axillary BP: 94/47 Pulse: 62 Heart Rate: 70 Resp: 14 SpO2: 95 % O2 Device: Nasal cannula Oxygen Delivery: 2 LPM  There were no vitals filed for this visit.  Vital Signs with Ranges  Temp:  [95  F (35  C)-97.9  F (36.6  C)] 97.9  F (36.6  C)  Pulse:  [62-72] 62  Heart Rate:  [69-73] 70  Resp:  [9-45] 14  BP: ()/(39-64) 94/47  SpO2:  [95 %-100 %] 95 %  I/O last 3 completed shifts:  In: 1996 [P.O.:240; I.V.:1316]  Out: -     Constitutional: Awake, alert, cooperative, moderate distress, constantly moving to try to get comfortable  Respiratory: Clear to auscultation bilaterally, no crackles or wheezing  Cardiovascular:  Regular rate and rhythm, normal S1 and S2, and no murmur noted  GI: Normal bowel sounds, soft, non-distended, non-tender  Skin/Integumen: No rashes, no cyanosis, +1 left lower extremity edema. Left hip dressed.  Other:     Medications     lactated ringers 50 mL/hr at 12/21/18 1726       acetaminophen  975 mg Oral Q8H     apixaban ANTICOAGULANT  2.5 mg Oral BID     insulin aspart  1-7 Units Subcutaneous TID AC     insulin aspart  1-5 Units Subcutaneous At Bedtime     metoprolol tartrate  25 mg Oral BID     midodrine  2.5 mg Oral Once     piperacillin-tazobactam  2.25 g Intravenous Q6H     potassium chloride  20 mEq Oral Daily     pregabalin  75 mg Oral BID     ranitidine  150 mg Oral Daily     senna-docusate  2 tablet Oral BID     simvastatin  10 mg Oral At Bedtime     sodium chloride (PF)  3 mL Intracatheter Q8H     torsemide  20 mg Oral Daily     umeclidinium  1 puff Inhalation QPM       Data   Recent Labs   Lab 12/22/18  0804 12/21/18  1012 12/21/18  0755 12/20/18  0726 12/19/18  2053 12/19/18  1133   WBC 6.4  --  5.9  --   --  5.0   HGB 7.0*  --  7.7* 7.8*  --  6.6*   MCV 84  --  81  --   --  86     --  274  --   --  296    144  --   --  141 143   POTASSIUM 3.9 4.0  --   --  4.1 4.2   CHLORIDE 105 104  --   --  104 106   CO2 33* 31  --   --  29 30   BUN 30 32*  --   --  35* 33*   CR 1.89* 1.41*  --   --  1.76* 1.58*   ANIONGAP 5 9  --   --  8 7   GERARD 9.2 9.6  --   --  9.0 9.7   * 163*  --   --  223* 143*   ALBUMIN  --   --   --   --  2.8* 3.1*   PROTTOTAL  --   --   --   --  6.5* 7.1   BILITOTAL  --   --   --   --  0.6 0.6   ALKPHOS  --   --   --   --  82 74   ALT  --   --   --   --  10 9   AST  --   --   --   --  6 6       Recent Results (from the past 24 hour(s))   XR Pelvis w Hip Port Left 1 View    Narrative    PORTABLE ONE VIEW PELVIS AND ONE VIEW LEFT HIP 12/21/2018 4:03 PM     HISTORY: Postoperative evaluation of the left hip.    COMPARISON: 10/20/2018    FINDINGS: There are surgical  changes of a left hip bipolar  arthroplasty. The hardware is intact with no fracture or other  complication seen. Lateral skin staples are seen. A soft tissue drain  is present. Again seen is calcification in the vascular structures and  degenerative changes of the right hip. No other abnormality is  demonstrated.      Impression    IMPRESSION: Unremarkable postoperative appearance of the left hip.    SAMANTHA BANUELOS MD

## 2018-12-22 NOTE — PROVIDER NOTIFICATION
Prescriber Notification Note    The pharmacist has communicated with this patient's provider regarding a concern or therapy recommendation.    Notified Person: Nicolás  Date/Time of Notification: 12/22/18 1130  Interaction: phone  Concern/Recommendation:discussed hx hives to pcn and zosyn order     Comments/Additional Details:Dr Monzon says she has tolerated penicillins numerous times.

## 2018-12-22 NOTE — PLAN OF CARE
Discharge Planner PT     PT: Orders received, eval completed, tx initiated. POD1 L hip CHARLIE I&D  Pt lives in assisted living alone with help for bathing and cleaning. At baseline ambulates with FWW. States she had outside care some in following initial procedure to help with mobility.    Patient plan for discharge: Return to [facility]  Current status: Re-educated on posterior hip precautions, pt alex recalls some. Mod-A supine>sit and to scoot to EOB. Sits EOB SBA x 2 minutes. CGA sit<>stand. Pt ambulates 20' CGA with FWW. Unable to fully perform step-to gait due to pain at hip and B shoulder and back due to UE support. Stops due to pain and rest and every 2-3 steps. Not tolerating well. Max-A sit>supine and to scoot to HOB. All transfers very labored.  Requires AA for CHARLIE exercises due to pain and weakness  Barriers to return to prior living situation: Requires assist for all mobility, lives alone  Recommendations for discharge: TCU  Rationale for recommendations: Assistance currently required for all mobility, would be able to return to [facility] is this is provided. Will need ongoing skilled PT intervention to improve independence and safety with functional mobility skills prior to returning home.       Entered by: Cong Mendoza 12/22/2018 9:47 AM

## 2018-12-22 NOTE — PLAN OF CARE
pt a&o. Up with one and walker. Needs abductor pillow when in bed. Pt went down for I&D with excision today. C/o pain in left hip. IV dilaudid and scheduled tylenol given with some relief. Wound vac on left hip. Regular diet, fair appetite. Pt refusing CAPNO. Placed on continuous pulse ox. 3L O2. discharge date pending. Will continue to monitor and support.

## 2018-12-22 NOTE — PLAN OF CARE
OT rudi completed and treatment initiated. Previous level of function: Patient with recent L CHARLIE, WBAT and posterior hip precautions. Pt had been at TCU and transitioned to home with home care prior to admit. Pt lives in an AL, where she gets A with bathing and deep cleaning (2 hrs/week) at baseline. She also eats all meals in the cafeteria. She sets up her own meds at baseline, but does not drive or cook.      Discharge Planner OT   Patient plan for discharge: Home  Current status: Pt is moving better than this am (receiving blood during OT session for low HGB), functional mobility with CGA and verbal cueing to maintain hip precautions, with no c/o pain when up and moving. Transfer to low toilet with CGA, but needing Max A to get up from low toilet (as well as vc's for hip precautions). Pt has higher toilet at home, and could benefit from a commode over the toilet here to maintain her hip precautions, nursing notified.  Barriers to return to prior living situation: Pt lives alone, current level of A needed.  Recommendations for discharge: TCU  Rationale for recommendations: Skilled occupational therapy intervention is warranted at this time to address functional deficits and return pt to PLOF and previous living situation. Pt would prefer to discharge to home with home care, pending LOS and progress this may be possible.       Entered by: Rosamaria Amado 12/22/2018 3:26 PM

## 2018-12-23 NOTE — PLAN OF CARE
Patient alert oriented x 4, forgetful.  Lethargic until 1500.  Hgb 7.0 1 unit RBC transfused, tolerated well.  BP soft .  BGM with meals and sliding scale insulin.  Hip precautions maintained.  Wound vac intact to L hip continuous at 125mmhg.  Taking scheduled Tylenol and prn oxycodone x 1 for pain.  Ambulated to bathroom with 1A gait belt and walker.  Regular diet.

## 2018-12-23 NOTE — PROGRESS NOTES
S:  No acute events overnight. Pain managed. Worked with PT    O:  /50 (BP Location: Left arm)   Pulse 62   Temp 98.5  F (36.9  C) (Oral)   Resp 14   LMP  (LMP Unknown)   SpO2 95%       LLE:  Incisional wound vac in place  +DF/PF  SILT    A/P:  83 year old female s/p L hip hemiarthroplasty I&D and bone excision    - WBAT  - abx per ID, appreciated  - will remove incisional wound vac on POD#3  - dvt prophylaxis with lovenox while in hospital, transition to aspirin upon discharge  - pain control  - PT/OT  - Dispo planning, ok to discharge from ortho perspective when medically clear  - f/u with me in 2 weeks in. Can call my care coordinator Albina at [phone] to schedule appointment.

## 2018-12-23 NOTE — PROGRESS NOTES
[facility]    Hospitalist Progress Note      Assessment & Plan      Helene Gibbs is a 83 year old female with DM type 2, COPD with pulm htn, CKD stage 3 who presents with anemia on preop exam today -- hgb 6.6 and preop for left hip I & D after Left hip fracture with ORIF 11/20/18 and persistent wound drainage:     Iron Deficiency Anemia  Hgb 6.6 on admission. Severe iron deficiency noted, gastro-ccult positive. Received 1 unit packed redblood cells and one dose of iv venofer. Hematoma seen in OR, EBL 50ml.  --GI consulted, patient refused repeat colonoscopy or esophagogastroduodenoscopy, recently had workup in past year  --plan for capsule endoscopy outpatient -she is ok with that   --monitor hemoglobin , no active profound bleeding noted.   - Hgb 7.0 on 12/22and hypotensive, transfused her 1 unit of Pack RBC on 12/22, start her on oral iron sulfate 325 mg every other day.  - Remains on eliquis 2.5mg BID (dose on 12/22 AM to begin). Will need to trend Hgb on blood thinner.    Hb is stable at 7.7 now, started on on ferrous sulfate 325 mg every other day now.       Left Hip superficial infection  Greater trochanteric fracture  --Orthopedic surgery took patient for left hip I&D and excision of bone fragment (hematoma apparently seen during surgery)  - Orthopedic surgery requesting ID consult for wound infection (seems to spare the implant)  - posterior hip precautions, weight bearing as tolerated  - OT/PT consulted  - ID consulted and evaluated the patient and started her on IV Zosyn, which is appropriate, tolerating it well.    UTI  UA with large leuk esterase, elevated WBCs and mucous. Cx growing E coli and Enterococcus fecalis  Now started on IV Zosyn.         Type 2 diabetes mellitus with diabetic nephropathy (H)  --on glipizide at home  --continue moderate resistance sliding scale here with diabetic diet . BGs well controlled      CKD (chronic kidney disease) stage 3, GFR 30-59 ml/min  (H)  --monitor creatinine,   Overall her creatinine is at her baseline.       Pulmonary hypertension (H)    COPD (chronic obstructive pulmonary disease) (H)  --prn nebs       Chronic atrial fib, S/P ablation -- on Eliquis  - Resuming eliquis on saturday  --continue metoprolol and statin     - Hypotension.  Will transfuse 1 unit of Pack of RBC today.   Give dose of midodrine 2.5 mg once today.  She does get low blood at home as well.     DVT Prophylaxis: eliquis to resume 12/22  Code Status: DNR/DNI  Expected discharge: few days pending course of infection, hemoglobin stability    Transfused her unit of 1 Pack RBC on 12/22  Blood pressure stable this morning.  Culture pending at this time.     Discussed with the nursing staff taking care of the patient today     Kelvin Spencer MD  Text Page (7am - 6pm)    Interval History   Feeling much better this morning, slept well, pain in control, appetite improve, denies any chest pain, SOB,  Fever, chills, nausea or vomiting at this time.     No other significant event overnight.     -Data reviewed today: I reviewed all new labs and imaging results over the last 24 hours. I personally reviewed post op pelvis/hip shows hardware in place and drain    Physical Exam   Temp: 98.5  F (36.9  C) Temp src: Oral BP: 101/50   Heart Rate: 70 Resp: 14 SpO2: 95 % O2 Device: Nasal cannula Oxygen Delivery: 3 LPM  There were no vitals filed for this visit.  Vital Signs with Ranges  Temp:  [97.1  F (36.2  C)-98.5  F (36.9  C)] 98.5  F (36.9  C)  Heart Rate:  [67-71] 70  Resp:  [14-18] 14  BP: ()/(39-55) 101/50  SpO2:  [81 %-100 %] 95 %  I/O last 3 completed shifts:  In: 360 [P.O.:360]  Out: -     Constitutional: Awake, alert, cooperative, moderate distress, constantly moving to try to get comfortable  Respiratory: Clear to auscultation bilaterally, no crackles or wheezing  Cardiovascular: Regular rate and rhythm, normal S1 and S2, and no murmur noted  GI: Normal bowel sounds, soft,  non-distended, non-tender  Skin/Integumen: No rashes, no cyanosis, +1 left lower extremity edema. Left hip dressed.  Other:     Medications     lactated ringers 50 mL/hr at 12/23/18 0452       acetaminophen  975 mg Oral Q8H     apixaban ANTICOAGULANT  2.5 mg Oral BID     ferrous sulfate  325 mg Oral Every Other Day     insulin aspart  1-7 Units Subcutaneous TID AC     insulin aspart  1-5 Units Subcutaneous At Bedtime     metoprolol tartrate  25 mg Oral BID     piperacillin-tazobactam  2.25 g Intravenous Q6H     potassium chloride  20 mEq Oral Daily     pregabalin  75 mg Oral BID     ranitidine  150 mg Oral Daily     senna-docusate  2 tablet Oral BID     simvastatin  10 mg Oral At Bedtime     sodium chloride (PF)  3 mL Intracatheter Q8H     torsemide  20 mg Oral Daily     umeclidinium  1 puff Inhalation QPM       Data   Recent Labs   Lab 12/23/18  0737 12/22/18  1638 12/22/18  0804 12/21/18  1012 12/21/18  0755  12/19/18  2053 12/19/18  1133   WBC  --   --  6.4  --  5.9  --   --  5.0   HGB 7.7* 7.7* 7.0*  --  7.7*   < >  --  6.6*   MCV  --   --  84  --  81  --   --  86   PLT  --   --  233  --  274  --   --  296   NA  --   --  143 144  --   --  141 143   POTASSIUM  --   --  3.9 4.0  --   --  4.1 4.2   CHLORIDE  --   --  105 104  --   --  104 106   CO2  --   --  33* 31  --   --  29 30   BUN  --   --  30 32*  --   --  35* 33*   CR  --   --  1.89* 1.41*  --   --  1.76* 1.58*   ANIONGAP  --   --  5 9  --   --  8 7   GERARD  --   --  9.2 9.6  --   --  9.0 9.7   GLC  --   --  151* 163*  --   --  223* 143*   ALBUMIN  --   --   --   --   --   --  2.8* 3.1*   PROTTOTAL  --   --   --   --   --   --  6.5* 7.1   BILITOTAL  --   --   --   --   --   --  0.6 0.6   ALKPHOS  --   --   --   --   --   --  82 74   ALT  --   --   --   --   --   --  10 9   AST  --   --   --   --   --   --  6 6    < > = values in this interval not displayed.       No results found for this or any previous visit (from the past 24 hour(s)).

## 2018-12-23 NOTE — PROGRESS NOTES
[facility]    Infectious Disease Progress Note    Date of Service (when I saw the patient): 12/23/2018     Assessment & Plan   Helene Gibbs is a 83 year old female who was admitted on 12/19/2018.     Impression:  1. 83 y.o female with history of left hip fracture, s/p left hip edison arthroplasty done on 11/20.   2. Admitted this occasion with persistent drainage from the wound.   3. No fevers or chills.   4. S/p I and D of the left hip, no deep infection suspected.   5. UA pos, UC with E coli and E faecalis.   6. On ceftriaxone and vancomycin.   7. PCN listed as allergy, but patient tells me she has tolerated and taken amoxi and Augmentin various times in past. The reaction listed in the chart is hives but patient remembers rash when she was is in 20`s.      Recommendations:   Zosyn for now. Covers the urine, tolerating it ok   Follow up on the OR cultures.             Jai Monzon MD    Interval History   More awake   Doing ok     Physical Exam   Temp: 98.5  F (36.9  C) Temp src: Oral BP: 101/50   Heart Rate: 70 Resp: 14 SpO2: 95 % O2 Device: Nasal cannula Oxygen Delivery: 3 LPM  There were no vitals filed for this visit.  Vital Signs with Ranges  Temp:  [97.1  F (36.2  C)-98.5  F (36.9  C)] 98.5  F (36.9  C)  Heart Rate:  [67-71] 70  Resp:  [14-18] 14  BP: ()/(39-55) 101/50  SpO2:  [81 %-100 %] 95 %    Constitutional: Awake, alert, cooperative, no apparent distress  Lungs: Clear to auscultation bilaterally, no crackles or wheezing  Cardiovascular: Regular rate and rhythm, normal S1 and S2, and no murmur noted  Abdomen: Normal bowel sounds, soft, non-distended, non-tender  Skin: No rashes, no cyanosis, no edema  Other:    Medications     lactated ringers 50 mL/hr at 12/23/18 0452       acetaminophen  975 mg Oral Q8H     apixaban ANTICOAGULANT  2.5 mg Oral BID     ferrous sulfate  325 mg Oral Every Other Day     insulin aspart  1-7 Units Subcutaneous TID AC     insulin aspart  1-5 Units  Subcutaneous At Bedtime     metoprolol tartrate  25 mg Oral BID     piperacillin-tazobactam  2.25 g Intravenous Q6H     potassium chloride  20 mEq Oral Daily     pregabalin  75 mg Oral BID     ranitidine  150 mg Oral Daily     senna-docusate  2 tablet Oral BID     simvastatin  10 mg Oral At Bedtime     sodium chloride (PF)  3 mL Intracatheter Q8H     torsemide  20 mg Oral Daily     umeclidinium  1 puff Inhalation QPM       Data   All microbiology laboratory data reviewed.  Recent Labs   Lab Test 12/23/18  0737 12/22/18  1638 12/22/18  0804 12/21/18  0755  12/19/18  1133   WBC  --   --  6.4 5.9  --  5.0   HGB 7.7* 7.7* 7.0* 7.7*   < > 6.6*   HCT  --   --  24.3* 26.2*  --  24.8*   MCV  --   --  84 81  --  86   PLT  --   --  233 274  --  296    < > = values in this interval not displayed.     Recent Labs   Lab Test 12/22/18  0804 12/21/18  1012 12/19/18  2053   CR 1.89* 1.41* 1.76*     Recent Labs   Lab Test 05/03/16  1347   SED 15     Recent Labs   Lab Test 12/21/18  1402 12/19/18  2230 10/23/18  0911 10/22/18  2300 10/19/18  2300 07/16/18  1740 04/16/18  1352 04/06/18  0820 10/19/16  2035   CULT Culture in progress  Culture negative monitoring continues 10,000 to 50,000 colonies/mL  Escherichia coli  *  10,000 to 50,000 colonies/mL  Enterococcus faecalis  * Light growth  Escherichia coli  *  Light growth  Strain 2  Escherichia coli  *  Plus  Light growth  Normal pal   No growth <10,000 colonies/mL  Escherichia coli  * No growth >100,000 colonies/mL  Escherichia coli  * 10,000 to 50,000 colonies/mL  mixed urogenital pal   10,000 to 50,000 colonies/mL mixed urogenital pal Susceptibility testing not   routinely done

## 2018-12-23 NOTE — PLAN OF CARE
Discharge Planner PT   Patient plan for discharge: Return to senior living  Current status: Re-educated on posterior hip precautions, pt recalls 2/4. Mod-A supine>sit and to scoot to EOB. Sits EOB SBA x 3 minutes. CGA x 2 reps from EOB and x 1 reps from elevated toilet sit<>stand with cues for technique with FWW. Pt ambulates 40' CGA with FWW. Unable to fully perform step-to gait due to pain at hip and B shoulder and back due to UE support, cues to stay close to the walker, demos flexed forward trunk 2 standing rest breaks with distance. Pt amb to and from toilet 10ft x 2 with CGA and FWW, cues to not abandon walker in tight space of bathroom, requires CGA for standing balance with pericares. Pt encouraged to sit up for meals in wc that was set up for pt and encouraged to sit up in w/c.   Barriers to return to prior living situation: Requires assist for all mobility, lives alone  Recommendations for discharge: TCU  Rationale for recommendations: Assistance currently required for all mobility, would be able to return to [facility] is this is provided. Will need ongoing skilled PT intervention to improve independence and safety with functional mobility skills prior to returning home.         Entered by: Judy Gallagher 12/23/2018 12:51 PM

## 2018-12-23 NOTE — PLAN OF CARE
Discharge Planner OT   Patient plan for discharge: Home  Current status: Pt demo's functional mobility with CGA and verbal cueing to maintain hip precautions, with increased c/o pain when up and moving. Pt extremely limited by pain this am, Min A to get in and out of bed and low activity tolerance.  Barriers to return to prior living situation: Pt lives alone, current level of A needed.  Recommendations for discharge: TCU  Rationale for recommendations: Skilled occupational therapy intervention is warranted at this time to address functional deficits and return pt to PLOF and previous living situation. Pt would prefer to discharge to home, but based on how she's moving this AM OT continues to recommend TCU.        Entered by: Rosamaria Amado 12/23/2018 9:36 AM

## 2018-12-23 NOTE — PLAN OF CARE
Alert and oriented but forgetful.  Patient oxygen levels drop in the 80's when she falls asleep placed on oxygen at 3L NC.  Has wound vac continuous that will be removed 12/24 by ortho.  Patient tolerating diet well.  Voiding in bathroom.  Ambulated in hallway x 3 with walker and SBA.  BGM with meals and sliding scale insulin.  Started home glipizide dose today.  Remains on IV ATB q 6 H.  LR runnning at 50/hr.   Pain at 8 with ambulation prn oxycodone given x 2 and scheduled Tylenol.  Patient possible discharge tomorrow with SW to f/u to see if back to AL or to TCU.

## 2018-12-23 NOTE — PLAN OF CARE
No events this [id number] shift. Pain controlled with scheduled Tylenol, PRN oxy x 1, ice. CMS intact, pedal pulses palpable. Wound vac in place, minimal output.  Tolerating diet, fluids. On 2-3 L overnight, but stable in low 90s on room air this morning. History COPD. Up to restroom with A1 and walker. Ambulating well once standing. Bed alarm for safety.

## 2018-12-24 NOTE — PLAN OF CARE
PT: Attempted to see pt in AM however pt just returned to bed with OT, mobilizing well within precautions, cancel PT this day

## 2018-12-24 NOTE — PLAN OF CARE
Discharge Planner OT   Patient plan for discharge: would like to go home but open to TCU   Current status: Pt in supine upon arrival. Educated on hip precautions- pt receptive. Pt completed bed mobility supine <> sit with min A. CGA for sit <> stand transfers with FWW. functional ambulation completed x 50 ft- dec activity tolerance noted. Pt returned to supine with min A for LE management into bed. Pt stood at sink with CGA and FWW to perform grooming x 4 minutes with set-up assistance. Pt ambulated to bathroom with CGA and FWW. CGA for toilet transfer with grab bars. Verbal cues for hip precautions. Discussed d/c plans- pt would prefer to go home but is open to TCU.   Barriers to return to prior living situation: current level of A, fall risk, post op precautions   Recommendations for discharge: TCU   Rationale for recommendations: Pt would benefit from continued skilled OT services to improve independence and safety with ADL's and functional transfers as pt is not at baseline.           Entered by: Azul Alfaro 12/24/2018 11:26 AM

## 2018-12-24 NOTE — PROGRESS NOTES
[facility]    Hospitalist Progress Note      Assessment & Plan      Helene Gibbs is a 83 year old female with DM type 2, COPD with pulm htn, CKD stage 3 who presents with anemia on preop exam today -- hgb 6.6 and preop for left hip I & D after Left hip fracture with ORIF 11/20/18 and persistent wound drainage:     Iron Deficiency Anemia  Hgb 6.6 on admission. Severe iron deficiency noted, gastro-ccult positive. Received 1 unit packed redblood cells and one dose of iv venofer. Hematoma seen in OR, EBL 50ml.  --GI consulted, patient refused repeat colonoscopy or esophagogastroduodenoscopy, recently had workup in past year  --plan for capsule endoscopy outpatient -she is ok with that   --monitor hemoglobin , no active profound bleeding noted.   - Hgb 7.0 on 12/22and hypotensive, transfused her 1 unit of Pack RBC on 12/22, start her on oral iron sulfate 325 mg every other day.  - Remains on eliquis 2.5mg BID (dose on 12/22 AM to begin). Will need to trend Hgb on blood thinner, Hb is stable at this time.     Hb is stable at 7.7 now, started on on ferrous sulfate 325 mg every other day now.       Left Hip superficial infection  Greater trochanteric fracture  --Orthopedic surgery took patient for left hip I&D and excision of bone fragment (hematoma apparently seen during surgery)  - Orthopedic surgery requesting ID consult for wound infection (seems to spare the implant)  - posterior hip precautions, weight bearing as tolerated  - OT/PT consulted  - ID consulted and evaluated the patient and started her on IV Zosyn, which is appropriate, tolerating it well.    UTI  UA with large leuk esterase, elevated WBCs and mucous. Cx growing E coli and Enterococcus fecalis  Now started on IV Zosyn.         Type 2 diabetes mellitus with diabetic nephropathy (H)  --on glipizide at home  --continue moderate resistance sliding scale here with diabetic diet . BGs well controlled      CKD (chronic kidney disease) stage  3, GFR 30-59 ml/min (H)  --monitor creatinine,   Overall her creatinine is at her baseline.       Pulmonary hypertension (H)    COPD (chronic obstructive pulmonary disease) (H)  --prn nebs       Chronic atrial fib, S/P ablation -- on Eliquis  - Resuming eliquis on saturday  --continue metoprolol and statin     - Hypotension.  Will transfuse 1 unit of Pack of RBC on 13/23   Give dose of midodrine 5 mg once today.  She does get low blood at home as well.     -Acute hypoxic respiratory failure Possibly secondary to fluid overload/Diastolic CHF  X-ray chest now, IS and flutter.  Give dose of IV lasix 20 mg X 1 now.  Pulmonary toilet,   Will try to wean her off later today       DVT Prophylaxis: eliquis to resume 12/22  Code Status: DNR/DNI  Expected discharge: few days pending course of infection, hemoglobin stability    Hb is stable at this time  Xray chest now, shows some atelectasis and some congestion.  Give her dose of IV lasix 20 mg X 1  Midodrine 5 mg X 1  Howl oral antihypertensive today.  Aggressive IS and flutter.     Discussed with the nursing staff taking care of the patient today     Kelvin Spencer MD  Text Page (7am - 6pm)    Interval History   Patient feeling some SOB, saturation was on lower side this morning, mild cough, no chest pain, fever, chills  Headache or dizziness.     No other significant event overnight.     -Data reviewed today: I reviewed all new labs and imaging results over the last 24 hours. I personally reviewed post op pelvis/hip shows hardware in place and drain    Physical Exam   Temp: 97.1  F (36.2  C) Temp src: Oral BP: (!) 89/53   Heart Rate: 70 Resp: 16 SpO2: 94 % O2 Device: Nasal cannula Oxygen Delivery: 2.5 LPM  There were no vitals filed for this visit.  Vital Signs with Ranges  Temp:  [95.7  F (35.4  C)-97.4  F (36.3  C)] 97.1  F (36.2  C)  Heart Rate:  [69-74] 70  Resp:  [16-18] 16  BP: ()/(48-58) 89/53  SpO2:  [94 %-99 %] 94 %  I/O last 3 completed shifts:  In: 460  [P.O.:460]  Out: -     Constitutional: Awake, alert, cooperative, moderate distress, constantly moving to try to get comfortable  Respiratory:+ve crackles bilaterally today   Cardiovascular: Regular rate and rhythm, normal S1 and S2, and no murmur noted  GI: Normal bowel sounds, soft, non-distended, non-tender  Skin/Integumen: No rashes, no cyanosis, +1 left lower extremity edema. Left hip dressed.  Other:     Medications       acetaminophen  975 mg Oral Q8H     apixaban ANTICOAGULANT  2.5 mg Oral BID     ferrous sulfate  325 mg Oral Every Other Day     furosemide  20 mg Intravenous Once     glipiZIDE  5 mg Oral QAM AC     insulin aspart  1-7 Units Subcutaneous TID AC     insulin aspart  1-5 Units Subcutaneous At Bedtime     metoprolol tartrate  25 mg Oral BID     midodrine  5 mg Oral Once     piperacillin-tazobactam  2.25 g Intravenous Q6H     potassium chloride  20 mEq Oral Daily     pregabalin  75 mg Oral BID     ranitidine  150 mg Oral Daily     senna-docusate  2 tablet Oral BID     simvastatin  10 mg Oral At Bedtime     sodium chloride (PF)  3 mL Intracatheter Q8H     torsemide  20 mg Oral Daily     umeclidinium  1 puff Inhalation QPM       Data   Recent Labs   Lab 12/24/18  0746 12/23/18  0737 12/22/18  1638 12/22/18  0804 12/21/18  1012 12/21/18  0755  12/19/18  2053 12/19/18  1133   WBC 7.8  --   --  6.4  --  5.9  --   --  5.0   HGB 7.9* 7.7* 7.7* 7.0*  --  7.7*   < >  --  6.6*   MCV 85  --   --  84  --  81  --   --  86     --   --  233  --  274  --   --  296     --   --  143 144  --   --  141 143   POTASSIUM 4.1  --   --  3.9 4.0  --   --  4.1 4.2   CHLORIDE 103  --   --  105 104  --   --  104 106   CO2 31  --   --  33* 31  --   --  29 30   BUN 32*  --   --  30 32*  --   --  35* 33*   CR 1.97*  --   --  1.89* 1.41*  --   --  1.76* 1.58*   ANIONGAP 4  --   --  5 9  --   --  8 7   GERARD 9.0  --   --  9.2 9.6  --   --  9.0 9.7   GLC 93  --   --  151* 163*  --   --  223* 143*   ALBUMIN 2.5*  --   --    --   --   --   --  2.8* 3.1*   PROTTOTAL  --   --   --   --   --   --   --  6.5* 7.1   BILITOTAL  --   --   --   --   --   --   --  0.6 0.6   ALKPHOS  --   --   --   --   --   --   --  82 74   ALT  --   --   --   --   --   --   --  10 9   AST  --   --   --   --   --   --   --  6 6    < > = values in this interval not displayed.       Recent Results (from the past 24 hour(s))   XR Chest Port 1 View    Narrative    XR CHEST PORT 1 VW 12/24/2018 9:10 AM    HISTORY: Hypoxia.    COMPARISON: 10/25/2018    FINDINGS: Small bilateral pleural effusions with associated basilar  atelectasis. Upper lungs are clear. No pneumothorax. Stable heart  size. Cardiac device appears stable.      Impression    IMPRESSION: Small bilateral pleural effusions.    BRITNEY JOHNSON MD

## 2018-12-24 NOTE — PLAN OF CARE
A&Ox4.  VSS.  [id number] on 2.LO2.  Afebrile.  CMS intact.  Woundvac to left hip incision removed and replaced with ABD pad (Done by Ortho).  BLE edema.  LS diminished/crackles.  Got IV Lasix x1.  Up with SBA and walker to bathroom.  Continent and voiding.  IV SL. Got CXR today.  On IV ABX. Received Oxycodone for pain.

## 2018-12-24 NOTE — CONSULTS
Care Transition Initial Assessment - EVAN  Reason For Consult: discharge planning  Met with: Patient    Principal Problem:    Anemia  Active Problems:    Type 2 diabetes mellitus with diabetic nephropathy (H)    CKD (chronic kidney disease) stage 3, GFR 30-59 ml/min (H)    Pulmonary hypertension (H)    COPD (chronic obstructive pulmonary disease) (H)    Chronic atrial fib, S/P ablation -- on Eliquis    S/p left hip fracture 11/20/18 -- persistent drainage    Anemia, iron deficiency       DATA  Lives With: facility resident   Living Arrangements: assisted living  Who is your support system?: Other (specify)(Extended family )  Identified issues/concerns regarding health management:      ASSESSMENT  Cognitive Status:  awake, alert and oriented  Concerns to be addressed: Per Social Work consult for discharge planning. Patient was admitted 12/19 for Low Hemo Anemia. Discharge date pending. Patient lives at the [facility] in apartment. SW informing that PT recommends TCU. Patient indicating that she was previously at [facility] and would be willing to go back to [facility] in a private room. SW informing of private room fee, indicating that she is okay with this fee.  SW will make referral to [facility] for private room.      PLAN  Financial costs for the patient includes: Private room fee  Patient given options and choices for discharge: TCU   Patient/family is agreeable to the plan?  Yes:   Patient Goals and Preferences: [facility] private room   Patient anticipates discharging to:  [facility] private room       ADDENDUM:    D: EVAN spoke with niece Enedelia Asad, [phone] suggesting that pt may accept [facility] for TCU. EVAN made call to [facility] who indicate that they have a stack of referrals and they have not reviewed pt yet. Asking SW to call tomorrow to follow up on referral. EVAN spoke with pt who indicates that she would like referral to [facility] as well. SW sent referral to [facility] as well.     Hellen NOVOA  NICOLE Carter

## 2018-12-24 NOTE — PROGRESS NOTES
[facility]    Infectious Disease Progress Note    Date of Service (when I saw the patient): 12/24/2018     Assessment & Plan   Helene Gibbs is a 83 year old female who was admitted on 12/19/2018.     Impression:  1. 83 y.o female with history of left hip fracture, s/p left hip edison arthroplasty done on 11/20.   2. Admitted this occasion with persistent drainage from the wound.   3. No fevers or chills.   4. S/p I and D of the left hip, no deep infection suspected cultures from the wound positive for E faecalis and CoNS.   5. UA pos, UC with E coli and E faecalis.   6. PCN listed as allergy, but patient tells me she has tolerated and taken amoxi and Augmentin various times in past. The reaction listed in the chart is hives but patient remembers rash when she was is in 20`s.      Recommendations:   Is on zosyn, it is covering the urine and the e faecalis in the wound cultures.   Will request lab to run susceptibilities on the CoNS.           Jai Monzon MD    Interval History   More awake   Doing ok   Worsening creat     Physical Exam   Temp: 97.1  F (36.2  C) Temp src: Oral BP: (!) 89/53   Heart Rate: 70 Resp: 16 SpO2: 94 % O2 Device: Nasal cannula Oxygen Delivery: 2.5 LPM  There were no vitals filed for this visit.  Vital Signs with Ranges  Temp:  [95.7  F (35.4  C)-97.4  F (36.3  C)] 97.1  F (36.2  C)  Heart Rate:  [69-74] 70  Resp:  [14-18] 16  BP: ()/(48-58) 89/53  SpO2:  [94 %-99 %] 94 %    Constitutional: Awake, alert, cooperative, no apparent distress  Lungs: Clear to auscultation bilaterally, no crackles or wheezing  Cardiovascular: Regular rate and rhythm, normal S1 and S2, and no murmur noted  Abdomen: Normal bowel sounds, soft, non-distended, non-tender  Skin: No rashes, no cyanosis, no edema  Other:    Medications       acetaminophen  975 mg Oral Q8H     apixaban ANTICOAGULANT  2.5 mg Oral BID     ferrous sulfate  325 mg Oral Every Other Day     furosemide  20 mg Intravenous  Once     glipiZIDE  5 mg Oral QAM AC     insulin aspart  1-7 Units Subcutaneous TID AC     insulin aspart  1-5 Units Subcutaneous At Bedtime     metoprolol tartrate  25 mg Oral BID     midodrine  5 mg Oral Once     piperacillin-tazobactam  2.25 g Intravenous Q6H     potassium chloride  20 mEq Oral Daily     pregabalin  75 mg Oral BID     ranitidine  150 mg Oral Daily     senna-docusate  2 tablet Oral BID     simvastatin  10 mg Oral At Bedtime     sodium chloride (PF)  3 mL Intracatheter Q8H     torsemide  20 mg Oral Daily     umeclidinium  1 puff Inhalation QPM       Data   All microbiology laboratory data reviewed.  Recent Labs   Lab Test 12/24/18  0746 12/23/18  0737 12/22/18  1638 12/22/18  0804 12/21/18  0755   WBC 7.8  --   --  6.4 5.9   HGB 7.9* 7.7* 7.7* 7.0* 7.7*   HCT 27.6*  --   --  24.3* 26.2*   MCV 85  --   --  84 81     --   --  233 274     Recent Labs   Lab Test 12/24/18  0746 12/22/18  0804 12/21/18  1012   CR 1.97* 1.89* 1.41*     Recent Labs   Lab Test 05/03/16  1347   SED 15     Recent Labs   Lab Test 12/21/18  1402 12/19/18  2230 10/23/18  0911 10/22/18  2300 10/19/18  2300 07/16/18  1740 04/16/18  1352 04/06/18  0820 10/19/16  2035   CULT Light growth  Coagulase negative Staphylococcus  Susceptibility testing not routinely done  *  Light growth  Enterococcus faecalis  Susceptibility testing in progress  *  Culture negative monitoring continues 10,000 to 50,000 colonies/mL  Escherichia coli  *  10,000 to 50,000 colonies/mL  Enterococcus faecalis  * Light growth  Escherichia coli  *  Light growth  Strain 2  Escherichia coli  *  Plus  Light growth  Normal pal   No growth <10,000 colonies/mL  Escherichia coli  * No growth >100,000 colonies/mL  Escherichia coli  * 10,000 to 50,000 colonies/mL  mixed urogenital pal   10,000 to 50,000 colonies/mL mixed urogenital pal Susceptibility testing not   routinely done

## 2018-12-24 NOTE — PLAN OF CARE
Alert and oriented but forgetful.  Patient oxygen levels drop in the 80's when she falls asleep placed on oxygen at 3L NC.  Has wound vac continuous that will be removed 12/24 by ortho.  Patient tolerating diet well.  Voiding in bathroom. No insulin coverage needed at 2200.  Started home glipizide dose yesterday.  Remains on IV ATB q 6 H.  LR runnning at 50/hr. Patient possible discharge today with SW to f/u to see if back to AL or to TCU.

## 2018-12-24 NOTE — PROGRESS NOTES
Orthopedic Surgery  Helene Gibbs  2018  Admit Date:  2018  POD # 3  S/P Left hip washout, chronic draining wound    Patient resting comfortably in bed.    Pain controlled.  Tolerating oral intake.    Denies nausea or vomiting  Denies chest pain or shortness of breath  No events overnight.     Alert and orient to person, place, and time.  Vital Sign Ranges  Temperature Temp  Av.9  F (36.1  C)  Min: 95.7  F (35.4  C)  Max: 97.4  F (36.3  C)   Blood pressure Systolic (24hrs), Av , Min:89 , Max:114        Diastolic (24hrs), Av, Min:48, Max:58      Pulse No Data Recorded   Respirations Resp  Av.4  Min: 16  Max: 18   Pulse oximetry SpO2  Av.8 %  Min: 94 %  Max: 99 %       Dressing is clean, dry, and intact.   Wound vac removed  Incision is clean, dry, and intact, no drainage noted  Staples intact  Minimal erythema of the surrounding skin.   Bilateral calves are soft, non-tender.  Bilateral lower extremity is NVI.  Sensation intact bilateral lower extremities  Active dorsi and plantar flexion bilaterally  +Dp pulse    Labs:  Recent Labs   Lab Test 18  0746 18  0804 18  1012   POTASSIUM 4.1 3.9 4.0     Recent Labs   Lab Test 18  0746 18  0737 18  1638   HGB 7.9* 7.7* 7.7*     Recent Labs   Lab Test 10/19/18  2156 18  0800 18  1101   INR 1.54* 1.00 0.97     Recent Labs   Lab Test 18  0746 18  0804 18  0755    233 274       A/P  1. Plan   Continue Lovenox for DVT prophylaxis while inpt. ASA on discharge     Mobilize with PT/OT    WBAT.   Hip precautions     Continue current pain regiment.   Change dressing daily starting tomorrow    2. Disposition   Anticipate d/c to home with home care (previously set up) based on medical and ID clearance.    Azul Crow PA-C

## 2018-12-25 NOTE — PROGRESS NOTES
Patient has been assessed for Home Oxygen needs. Oxygen readings:    *Pulse oximetry (SpO2) = 90% on room air at rest while awake.    *SpO2 improved to 98% on 2 liters/minute at rest.    *SpO2 = 89% on room air during activity/with exercise.    *SpO2 improved to 94% on 2 liters/minute during activity/with exercise.

## 2018-12-25 NOTE — PLAN OF CARE
Pt is  A and O X4. VSS on 2 L O2. Reports L hip pain 7/10. Prn oxy 5 mg given: effective. Voiding adequately to bathroom, with Assist of 1 with GB and walker.  Regular diet, fair appetite. L hip dressing CDI. [facility]. Continue to monitor.

## 2018-12-25 NOTE — PROGRESS NOTES
[facility]    Infectious Disease Progress Note    Date of Service (when I saw the patient): 12/25/2018     Assessment & Plan   Helene Gibbs is a 83 year old female who was admitted on 12/19/2018.     Impression:  1. 83 y.o female with history of left hip fracture, s/p left hip edison arthroplasty done on 11/20.   2. Admitted this occasion with persistent drainage from the wound.   3. No fevers or chills.   4. S/p I and D of the left hip, no deep infection suspected cultures from the wound positive for E faecalis and CoNS.   5. UA pos, UC with E coli and E faecalis.   6. PCN listed as allergy, but patient tells me she has tolerated and taken amoxi and Augmentin various times in past. The reaction listed in the chart is hives but patient remembers rash when she was is in 20`s.  7 Renal insuff      Recommendations:   1 Doubt UC significant, and if so adequate tx already  2 Only prelim op note, not felt deep, only cx is wd cx, but describes bone fragment ??, ? Po only extended vs some IV, CN staph sig not clear sens pending, creat makes extended vanco undesirable  3 Get baseline inflamm markers          Karlos Antonio MD    Interval History   More awake   Doing ok   Worsening creat   cx same     Physical Exam   Temp: 98.6  F (37  C) Temp src: Oral BP: 136/63 Pulse: 70 Heart Rate: 73 Resp: 16 SpO2: 94 % O2 Device: Nasal cannula Oxygen Delivery: 2 LPM  There were no vitals filed for this visit.  Vital Signs with Ranges  Temp:  [96.5  F (35.8  C)-98.6  F (37  C)] 98.6  F (37  C)  Pulse:  [70] 70  Heart Rate:  [70-74] 73  Resp:  [13-16] 16  BP: (102-136)/(52-67) 136/63  SpO2:  [84 %-98 %] 94 %    Constitutional: Awake, alert, cooperative, no apparent distress  Lungs: Clear to auscultation bilaterally, no crackles or wheezing  Cardiovascular: Regular rate and rhythm, normal S1 and S2, and no murmur noted  Abdomen: Normal bowel sounds, soft, non-distended, non-tender  Skin: No rashes, no cyanosis, no  edema  Other:    Medications       apixaban ANTICOAGULANT  2.5 mg Oral BID     ferrous sulfate  325 mg Oral Every Other Day     glipiZIDE  5 mg Oral QAM AC     insulin aspart  1-7 Units Subcutaneous TID AC     insulin aspart  1-5 Units Subcutaneous At Bedtime     metoprolol tartrate  25 mg Oral BID     piperacillin-tazobactam  2.25 g Intravenous Q6H     potassium chloride  20 mEq Oral Daily     pregabalin  75 mg Oral BID     ranitidine  150 mg Oral Daily     senna-docusate  2 tablet Oral BID     simvastatin  10 mg Oral At Bedtime     sodium chloride (PF)  3 mL Intracatheter Q8H     torsemide  20 mg Oral Daily     umeclidinium  1 puff Inhalation QPM       Data   All microbiology laboratory data reviewed.  Recent Labs   Lab Test 12/25/18  0612 12/24/18  0746 12/23/18  0737  12/22/18  0804   WBC 6.7 7.8  --   --  6.4   HGB 7.7* 7.9* 7.7*   < > 7.0*   HCT 27.0* 27.6*  --   --  24.3*   MCV 86 85  --   --  84    257  --   --  233    < > = values in this interval not displayed.     Recent Labs   Lab Test 12/25/18  0612 12/24/18  0746 12/22/18  0804   CR 1.87* 1.97* 1.89*     Recent Labs   Lab Test 05/03/16  1347   SED 15     Recent Labs   Lab Test 12/21/18  1402 12/19/18  2230 10/23/18  0911 10/22/18  2300 10/19/18  2300 07/16/18  1740 04/16/18  1352 04/06/18  0820 10/19/16  2035   CULT Light growth  Coagulase negative Staphylococcus  *  Light growth  Enterococcus faecalis  *  Culture in progress  Susceptibility testing requested by  Dr Monzon [phone] for Coag Negative Staph. 12.24.18 1000. IRASEMA    Culture negative monitoring continues 10,000 to 50,000 colonies/mL  Escherichia coli  *  10,000 to 50,000 colonies/mL  Enterococcus faecalis  * Light growth  Escherichia coli  *  Light growth  Strain 2  Escherichia coli  *  Plus  Light growth  Normal pal   No growth <10,000 colonies/mL  Escherichia coli  * No growth >100,000 colonies/mL  Escherichia coli  * 10,000 to 50,000 colonies/mL  mixed urogenital  pal   10,000 to 50,000 colonies/mL mixed urogenital pal Susceptibility testing not   routinely done

## 2018-12-25 NOTE — PROGRESS NOTES
[facility]    Hospitalist Progress Note      Assessment & Plan      Helene Gibbs is a 83 year old female with DM type 2, COPD with pulm htn, CKD stage 3 who presents with anemia on preop exam today -- hgb 6.6 and preop for left hip I & D after Left hip fracture with ORIF 11/20/18 and persistent wound drainage.      Acute hypoxic respiratory failure   Presumed acute on chronic diastolic CHF  Placed on oxygen shortly after admission.  Initiated on lasix diuresis 12/24 with findings of volume overload.  TTE 3/2018 with EF 55-60%, moderate TR, mild pulm HTN.  - satting 88% on room air following activity today  - repeat lasix 20 mg IV x1  - continue PTA torsemide  - monitor I/O's, weights and BMP  - pulm toilet      Iron Deficiency Anemia  Hgb 6.6 on admission with iron studies consistent with severe deficiency, s/p 1 unit PRBC and single dose Venofer.  Repeat PRBC 12/22.  Hematoma seen in OR, EBL 50ml.  GI consulted, not interested in EGD/colonoscopy, will follow up outpatient for capsule study.  - continue oral iron sulfate 325 mg every other day.  - continue eliquis 2.5mg BID  - hgb stable 12/25, monitor    Left Hip superficial infection  Greater trochanteric fracture  Underwent left hip ORIF 10/2018 with persistent drainage since.  Underwent left hip I&D and excision of bone fragment (hematoma apparently seen during surgery) 12/21.  Culture growing E.faecalis and Coag Neg Staph.  - continue Zosyn per ID, recs appreciated  - check inflammatory markers  - posterior hip precautions, weight bearing as tolerated    Bacteruria  UA with large leuk esterase, elevated WBCs and mucous.  Culture growing only 10K colonies E.coli and E.faecalis.    Type 2 diabetes mellitus with diabetic nephropathy (H)  - continue PTA glipizide and medium sliding scale insulin     CKD (chronic kidney disease) stage 3, GFR 30-59 ml/min (H)  Cr fluctuated significantly over past year, baseline seems about 1.1.  - Cr 1.8 today,  monitor with diuresis    COPD (chronic obstructive pulmonary disease) (H)  - continue Incruse Ellipta  - prn nebs     Chronic atrial fib, S/P ablation  - continue Eliquis and metoprolol    HTN  Blood pressures running low this admission, likely due to blood loss.  - continue metoprolol, lisinopril on hold    Hyperlipidemia  - continue statin      DVT Prophylaxis: eliquis   Code Status: DNR/DNI  Expected discharge: TCU in 1-2 days pending adequate diuresis, transition to oral antibiotics.    Ervin Lares MD      Interval History   Denies any dyspnea, cough, fever/chills.  Mild left hip pain.  Moving bowels.  No other complaints.    -Data reviewed today: I reviewed all new labs and imaging results over the last 24 hours.     Physical Exam   Temp: 98.6  F (37  C) Temp src: Oral BP: 136/63 Pulse: 70 Heart Rate: 73 Resp: 16 SpO2: 94 % O2 Device: Nasal cannula Oxygen Delivery: 2 LPM  There were no vitals filed for this visit.  Vital Signs with Ranges  Temp:  [96.5  F (35.8  C)-98.6  F (37  C)] 98.6  F (37  C)  Pulse:  [70] 70  Heart Rate:  [70-74] 73  Resp:  [13-16] 16  BP: (102-136)/(52-67) 136/63  SpO2:  [84 %-98 %] 94 %  I/O last 3 completed shifts:  In: 220 [P.O.:220]  Out: -     Constitutional: well developed, well nourished elderly female in no acute distress  Respiratory:  Diminished bibasilar breath sounds with mild crackles, no wheezing or tachypnea  Cardiovascular: Regular rate and rhythm, normal S1 and S2, grade 1/6 systolic murmur  GI: Normal bowel sounds, soft  Skin/Integumen: +1 left lower extremity edema.  Other: Alert and appropriate, cranial nerves grossly intact    Medications       apixaban ANTICOAGULANT  2.5 mg Oral BID     ferrous sulfate  325 mg Oral Every Other Day     furosemide  20 mg Intravenous Once     glipiZIDE  5 mg Oral QAM AC     insulin aspart  1-7 Units Subcutaneous TID AC     insulin aspart  1-5 Units Subcutaneous At Bedtime     metoprolol tartrate  25 mg Oral BID      piperacillin-tazobactam  2.25 g Intravenous Q6H     potassium chloride  20 mEq Oral Daily     pregabalin  75 mg Oral BID     ranitidine  150 mg Oral Daily     senna-docusate  2 tablet Oral BID     simvastatin  10 mg Oral At Bedtime     sodium chloride (PF)  3 mL Intracatheter Q8H     torsemide  20 mg Oral Daily     umeclidinium  1 puff Inhalation QPM       Data   Recent Labs   Lab 12/25/18  0612 12/24/18  0746 12/23/18  0737  12/22/18  0804  12/19/18 2053 12/19/18  1133   WBC 6.7 7.8  --   --  6.4   < >  --  5.0   HGB 7.7* 7.9* 7.7*   < > 7.0*   < >  --  6.6*   MCV 86 85  --   --  84   < >  --  86    257  --   --  233   < >  --  296    138  --   --  143   < > 141 143   POTASSIUM 3.8 4.1  --   --  3.9   < > 4.1 4.2   CHLORIDE 102 103  --   --  105   < > 104 106   CO2 33* 31  --   --  33*   < > 29 30   BUN 31* 32*  --   --  30   < > 35* 33*   CR 1.87* 1.97*  --   --  1.89*   < > 1.76* 1.58*   ANIONGAP 5 4  --   --  5   < > 8 7   GERARD 9.0 9.0  --   --  9.2   < > 9.0 9.7   * 93  --   --  151*   < > 223* 143*   ALBUMIN 2.4* 2.5*  --   --   --   --  2.8* 3.1*   PROTTOTAL  --   --   --   --   --   --  6.5* 7.1   BILITOTAL  --   --   --   --   --   --  0.6 0.6   ALKPHOS  --   --   --   --   --   --  82 74   ALT  --   --   --   --   --   --  10 9   AST  --   --   --   --   --   --  6 6    < > = values in this interval not displayed.       No results found for this or any previous visit (from the past 24 hour(s)).

## 2018-12-25 NOTE — PROGRESS NOTES
Orthopedic Surgery  Helene Gibbs  2018  Admit Date:  2018  POD # 4  S/P left hip washout, chronic draining wound    Patient resting comfortably in bed.    Pain controlled.  Tolerating oral intake.    Denies nausea or vomiting.  Denies chest pain or shortness of breath.  No events overnight.      Alert and orient to person, place, and time.  Vital Sign Ranges  Temperature Temp  Av.8  F (36.6  C)  Min: 96.5  F (35.8  C)  Max: 98.6  F (37  C)   Blood pressure Systolic (24hrs), Av , Min:102 , Max:136        Diastolic (24hrs), Av, Min:52, Max:67      Pulse Pulse  Av  Min: 70  Max: 70   Respirations Resp  Avg: 15.6  Min: 13  Max: 16   Pulse oximetry SpO2  Av %  Min: 84 %  Max: 98 %       Dressing is clean, dry, and intact.   Minimal erythema of the surrounding skin.   Bilateral calves are soft, non-tender.   Bilateral lower extremity is NVI.  Sensation intact bilateral lower extremities.  5/5 motor with resisted dorsi and plantar flexion bilaterally.  +DP pulse.     Labs:  Recent Labs   Lab Test 18  0612 18  0746 18  0804   POTASSIUM 3.8 4.1 3.9     Recent Labs   Lab Test 18  0612 18  0746 18  0737   HGB 7.7* 7.9* 7.7*     Recent Labs   Lab Test 10/19/18  2156 18  0800 18  1101   INR 1.54* 1.00 0.97     Recent Labs   Lab Test 18  0612 18  0746 18  0804    257 233       A/P  1. Plan   Continue Eliquis for DVT prophylaxis.     Mobilize with PT/OT   WBAT   Daily dressing changes   Continue current pain regiment.    2. Disposition   Anticipate d/c to home based on medical and ID clearance.    Milka Anne PA-C

## 2018-12-25 NOTE — PLAN OF CARE
VSS. A&O x4. Denies pain. Up with 1, GB and walker. Fair appetite. Voiding adequately. 2L O2. Dressing CDI. Possible discharge to [facility] tomorrow.

## 2018-12-25 NOTE — PLAN OF CARE
Pt is A+OX4, forgetful. VSS on 2L NC. Dressing with moderate amount of serosanguineous drainage, changed X1 around midnight.  CDI since. Pain controlled on PRN oxycodone q3h.Regular diet. Up with assist of 1. 0300 blood sugar 105. Discharge pending placement at TCU.

## 2018-12-25 NOTE — PLAN OF CARE
Pt is A&Ox4, VSS on 2L O2. Oxygen test completed today-see notes. Can be forgetful at times. C/o L hip pain only with movement, given PRN oxycodone x1. L hip incision closed with staples, moderate serosanguinous drainage- new dressing applied today, CDI. Tolerating regular diet, voiding adequately in bathroom. R PIV SL + int zosyn. BG checks, no insulin coverage needed this shift. Up with A/1 + walker to ambulate. Plan to discharge to TCU tomorrow pending. Will continue to monitor.

## 2018-12-26 NOTE — PLAN OF CARE
POD 5. Patient A&Ox4, forgetful at times. Seemed very lethargic @ start of shift. Had to state name and arouse with repeated stimulation in order to wake pt up. Answered all orientation questions appropriately. Once pt got up and walked to bathroom became more awake. Seemed a bit irritated too. Denied pain on shift. Does have PRN Oxycodone avail. Ice applied to hip d/t swelling. On 2 liters O2 sating in mid 90's. VSS.C/o vaginal itchiness. Did good maggie-care and gave PRN Vistaril on shift. LS diminished. Incision on left hip closed with staples, moderate serosanguinous drainage- new dressing applied 12/25 PM's. Dressing is c/d/i. Voids in bathroom; calls appropriately. Right PIV SL; intermittent antibiotics. BG check @ 7041=026. WBAT, up with A1 and walker. Discharge pending TCU placement, possibly today. Continue to monitor.

## 2018-12-26 NOTE — OP NOTE
Procedure Date: 12/21/2018      PREOPERATIVE DIAGNOSIS:  Left hip draining wound, status post hemiarthroplasty.      POSTOPERATIVE DIAGNOSES:    1.  Left hip draining wound, status post hemiarthroplasty.   2.  Left greater trochanter fracture.      PROCEDURE:     1.  Left hip irrigation and debridement.   2.  Excision of greater trochanteric fracture piece.      SURGEON:  Ish Fish MD.      ESTIMATED BLOOD LOSS:  50 mL.      ANESTHESIA:  General.      COMPLICATIONS:  None apparent.      INDICATIONS:  The patient is an 83-year-old female who underwent a left hip hemiarthroplasty on 10/20/2018 that was uncomplicated; however, subsequently she developed some draining over the inferior aspect of her wound that persisted.  This persisted despite oral antibiotics and although she did not complain of any pain in her hip and she did not complain of any fevers or chills, the draining was concerning enough that we decided that the best way to move forward would be an operative I and D.  She agreed to proceed.      DESCRIPTION OF PROCEDURE:  On the date of the procedure, the patient was met in the preoperative area by the surgeon and anesthesia team.  Her left hip was marked by the operative surgeon and informed consent was obtained.  Risks and benefits of the surgery were discussed with the patient including risk of bleeding, infection, damage to neurovascular structures, risk of fracture, risk of dislocation and risk of need for future surgery.  She understood and agreed to proceed.     She was then brought to the operating room and general anesthesia was administered.  She was placed on the operating table in supine position and placed into the lateral decubitus position and held in position with hip holders.  The left hip was prepped and draped in the usual sterile fashion.  Preoperative antibiotics were given.  A preoperative timeout was performed.  She did have a small serous drainage from the inferior aspect of her  incision.  We began the procedure by excising the draining sinus and opening up her old incision.  There was a small purulent pocket in the superficial area superficial to the gluteus mareitta tendon.  This was copiously washed and irrigated.  We did send cultures for this.  We then continued to probe around and decided to open up the gluteus marietta fascia as well and noted that upon opening up the gluteus marietta fascia and the IT there was a hematoma that was evacuated, therefore we explored that further.  There was not any yayo pus in the joint itself, however, we did visualize that the tip of the greater trochanter was avulsed off along with part of the gluteus medius tendon.  The repair sutures that were holding the short external rotators as well as the capsule were attached to the piece that was avulsed off.  The sutures were removed and the avulsed piece was small enough that we would have a difficult time repairing it.  Therefore, we decided that the best way to move forward would be to excise that piece.  That piece of the greater trochanter was excised.  The wound and the joint were then copiously irrigated using approximately 3 liters of double antibiotic irrigation.  Again, there was no yayo pus in the area, therefore we left the hip alone.  We did sprinkle approximately 1 gram of vancomycin powder into the hip joint itself and then we repaired the gluteus medius back onto the remnant of the greater trochanter.  We also repaired the remnant capsule and short external rotators as best we could to the gluteus medius and the greater trochanter and then closed the IT band as well as the gluteus marietta fascia using a combination of #2 Ethibonds and #0 PDS.  We then sprinkled another gram of vancomycin powder into the superficial layer on top of the gluteus marietta and proceeded with closure.  A deep closure was performed with 0 PDS followed by 2-0 PDS for deep dermals followed by staples.  We then placed  an incisional wound VAC on top of the incision and placed the patient into a hip abduction wedge.  The patient was then awakened from anesthesia, taken off the table and brought to the PACU for recovery.         DAIN PAUL MD             D: 2018   T: 2018   MT: CHASE      Name:     PATRICIA BOBO   MRN:      [id number]        Account:        [id number]   :      1935           Procedure Date: 2018      Document: [id number]

## 2018-12-26 NOTE — DISCHARGE SUMMARY
[facility]  Hospitalist Discharge Summary       Date of Admission:  12/19/2018  Date of Discharge:  12/26/2018  Discharging Provider: Ervin Lares MD      Discharge Diagnoses   Acute hypoxic respiratory failure due to presumed acute on chronic diastolic CHF  Iron deficiency anemia  Left hip surgical wound infection  Greater trochanteric fracture  Bacteruria  Vulvovaginal candidiasis  DM II with nephropathy  CKD stage III  COPD  Hx Chronic A-fib s/p ablation  HTN  Hyperlipidemia    Follow-ups Needed After Discharge   Follow-up Appointments     Follow Up and recommended labs and tests      Follow up with [facility] physician.  The following labs/tests are recommended: BMP on 12/31/18.         Follow-up and recommended labs and tests       f/u with me, Dr. Fish, in 2 weeks in. Can call my care coordinator Albina at [phone] to schedule appointment.               Unresulted Labs Ordered in the Past 30 Days of this Admission     Date and Time Order Name Status Description    12/21/2018 1403 Anaerobic bacterial culture Preliminary       These results will be followed up by: Hospitalist service    Hospital Course   Helene Gibbs is a 83 year old female with DM type 2, COPD with pulm htn, CKD stage 3 who presents with anemia on preop exam today -- hgb 6.6 and preop for left hip I & D after Left hip fracture with ORIF 11/20/18 and persistent wound drainage.        Acute hypoxic respiratory failure   Presumed acute on chronic diastolic CHF  Placed on oxygen shortly after admission.  Initiated on lasix diuresis 12/24 with findings of volume overload, will receive last dose of IV lasix on day of discharge.  Follow daily weights and continue PTA torsemide.  TTE 3/2018 with EF 55-60%, moderate TR, mild pulm HTN.  Continued to have mild desaturation with activity at discharge, wean as able with ongoing diuresis.     Iron Deficiency Anemia  Hgb 6.6 on admission with iron studies consistent with severe  deficiency, s/p 1 unit PRBC and single dose Venofer.  Repeat PRBC 12/22.  Hematoma seen in OR, EBL 50ml.  GI consulted, not interested in EGD/colonoscopy, will follow up outpatient for capsule study.  Continue oral iron sulfate 325 mg every other day.     Left Hip surgical wound infection  Greater trochanteric fracture  Underwent left hip ORIF 10/2018 with persistent drainage since.  Underwent left hip I&D and excision of bone fragment (hematoma apparently seen during surgery) 12/21.  Culture growing E.faecalis and Coag Neg Staph.  Per discussion with Ortho 12/26, infection is not felt to be deep.  Per ID, continue on oral amoxicillin (reported hives allergy, but tolerated this in hospital) and minicycline x2 weeks.  Follow up with Ortho as scheduled.     Bacteruria  UA with large leuk esterase, elevated WBCs and mucous.  Culture growing only 10K colonies E.coli and E.faecalis, not felt to be of significance.    Vulvovaginal candidiasis  Reporting yeast infection symptoms due to antibiotics.  Initiated vaginal suppository 12/25, will continue for 1 week give long duration of antibiotic plan.     Type 2 diabetes mellitus with diabetic nephropathy (H)  Continue PTA glipizide     CKD (chronic kidney disease) stage 3, GFR 30-59 ml/min (H)  Cr fluctuated significantly over past year, ranging 1.1-1.8 (baseline seems closer to 1.1).  Cr peak of 1.97 on 12/24, now improving with diuresis, suggesting cardiorenal syndrome.  Repeat BMP in several days.     COPD (chronic obstructive pulmonary disease) (H)  Continue Incruse Ellipta      Chronic atrial fib, S/P ablation  Continue Eliquis and metoprolol.     HTN  Continue PTA metoprolol, lisinopril.     Hyperlipidemia  continue PTA statin      Consultations This Hospital Stay   ORTHOPEDIC SURGERY IP CONSULT  GASTROENTEROLOGY IP CONSULT  OCCUPATIONAL THERAPY ADULT IP CONSULT  PHYSICAL THERAPY ADULT IP CONSULT  INFECTIOUS DISEASES IP CONSULT  CARE TRANSITION RN/SW IP  CONSULT  OCCUPATIONAL THERAPY ADULT IP CONSULT  PHYSICAL THERAPY ADULT IP CONSULT    Code Status   DNR/DNI    Time Spent on this Encounter   I, Ervin Lares, personally saw the patient today and spent greater than 30 minutes discharging this patient.       Ervin Lares MD  [facility]  ______________________________________________________________________    Physical Exam   Vital Signs: Temp: 96.4  F (35.8  C) Temp src: Oral BP: 119/48   Heart Rate: 70 Resp: 18 SpO2: 95 % O2 Device: Nasal cannula Oxygen Delivery: 2 LPM  Weight: 0 lbs 0 oz  General Appearance: Well developed, well nourished elderly female in no acute distress  Respiratory: lungs clear bilaterally, no wheezes or crackles, no tachypnea  Cardiovascular: RRR, normal s1/s2 without murmur  GI: abdomen soft, normal bowel sounds  Lymph:  Trace-1+ bilateral lower extremity edema  Other: Alert and appropriate, cranial nerves grossly intact        Primary Care Physician   Neisha Esparza    Discharge Disposition   Discharged to nursing home  Condition at discharge: Stable    Significant Results and Procedures   Most Recent 3 CBC's:  Recent Labs   Lab Test 12/26/18  0620 12/25/18  0612 12/24/18  0746  12/22/18  0804   WBC  --  6.7 7.8  --  6.4   HGB 8.2* 7.7* 7.9*   < > 7.0*   MCV  --  86 85  --  84   PLT  --  227 257  --  233    < > = values in this interval not displayed.     Most Recent 3 BMP's:  Recent Labs   Lab Test 12/26/18  0620 12/25/18  0612 12/24/18  0746    140 138   POTASSIUM 3.7 3.8 4.1   CHLORIDE 105 102 103   CO2 31 33* 31   BUN 27 31* 32*   CR 1.70* 1.87* 1.97*   ANIONGAP 6 5 4   GERARD 9.1 9.0 9.0   GLC 99 110* 93     Most Recent 6 Bacteria Isolates From Any Culture (See EPIC Reports for Culture Details):  Recent Labs   Lab Test 12/21/18  1402 12/19/18  2230 10/23/18  0911 10/22/18  2300 10/19/18  2300 07/16/18  1740   CULT Light growth  Coagulase negative Staphylococcus  *  Light growth  Enterococcus faecalis  *   Susceptibility testing requested by  Dr Monzon [phone] for Coag Negative Staph. 12.24.18 1000. IRASEMA    Culture negative monitoring continues 10,000 to 50,000 colonies/mL  Escherichia coli  *  10,000 to 50,000 colonies/mL  Enterococcus faecalis  * Light growth  Escherichia coli  *  Light growth  Strain 2  Escherichia coli  *  Plus  Light growth  Normal pal   No growth <10,000 colonies/mL  Escherichia coli  * No growth     Most Recent Hemoglobin A1c:  Recent Labs   Lab Test 10/19/18  2156   A1C 5.5   ,   Results for orders placed or performed during the hospital encounter of 12/19/18   XR Pelvis w Hip Port Left 1 View    Narrative    PORTABLE ONE VIEW PELVIS AND ONE VIEW LEFT HIP 12/21/2018 4:03 PM     HISTORY: Postoperative evaluation of the left hip.    COMPARISON: 10/20/2018    FINDINGS: There are surgical changes of a left hip bipolar  arthroplasty. The hardware is intact with no fracture or other  complication seen. Lateral skin staples are seen. A soft tissue drain  is present. Again seen is calcification in the vascular structures and  degenerative changes of the right hip. No other abnormality is  demonstrated.      Impression    IMPRESSION: Unremarkable postoperative appearance of the left hip.    SAMANTHA BANUELOS MD   XR Chest Port 1 View    Narrative    XR CHEST PORT 1 VW 12/24/2018 9:10 AM    HISTORY: Hypoxia.    COMPARISON: 10/25/2018    FINDINGS: Small bilateral pleural effusions with associated basilar  atelectasis. Upper lungs are clear. No pneumothorax. Stable heart  size. Cardiac device appears stable.      Impression    IMPRESSION: Small bilateral pleural effusions.    BRITNEY JOHNSON MD     *Note: Due to a large number of results and/or encounters for the requested time period, some results have not been displayed. A complete set of results can be found in Results Review.       Discharge Orders      AntiEmbolism Stockings    Bilateral below knee length.On in the morning, off at night      Follow-up and recommended labs and tests     f/u with me, Dr. Fish, in 2 weeks in. Can call my care coordinator Albina at [phone] to schedule appointment.     Reason for your hospital stay    Left hip I&D chronic draining wound     Wound care and dressings    Instructions to care for your wound at home: as directed, daily dressing changes, ice to area for comfort and keep wound clean and dry.     General info for SNF    Length of Stay Estimate: Short Term Care: Estimated # of Days <30  Condition at Discharge: Improving  Level of care:skilled   Rehabilitation Potential: Excellent  Admission H&P remains valid and up-to-date: Yes  Recent Chemotherapy: N/A  Use Nursing Home Standing Orders: N/A     Mantoux instructions    Give two-step Mantoux (PPD) Per Facility Policy Yes     Daily weights    Call Provider for weight gain of more than 2 pounds per day or 5 pounds per week.     Follow Up and recommended labs and tests    Follow up with [facility] physician.  The following labs/tests are recommended: BMP on 12/31/18.     Activity - Up with nursing assistance     DNR/DNI     Occupational Therapy Adult Consult    Evaluate and treat as clinically indicated.    Reason:  deconditioning     Physical Therapy Adult Consult    Evaluate and treat as clinically indicated.    Reason:  deconditioning     Advance Diet as Tolerated    Follow this diet upon discharge:  Regular Diet Adult     Discharge Medications   Current Discharge Medication List      START taking these medications    Details   acetaminophen (TYLENOL) 325 MG tablet Take 2 tablets (650 mg) by mouth every 6 hours as needed for mild pain  Qty: 40 tablet, Refills: 0    Associated Diagnoses: S/p left hip fracture      amoxicillin (AMOXIL) 500 MG capsule Take 1 capsule (500 mg) by mouth every 12 hours for 14 days  Qty: 28 capsule, Refills: 0    Associated Diagnoses: Surgical wound infection      ferrous sulfate (FEROSUL) 325 (65 Fe) MG tablet Take 1 tablet (325  mg) by mouth every other day  Qty: 15 tablet, Refills: 0    Associated Diagnoses: Iron deficiency anemia, unspecified iron deficiency anemia type      miconazole (MICATIN) 100 MG vaginal suppository Place 1 suppository (100 mg) vaginally At Bedtime for 6 days  Qty: 6 suppository, Refills: 0    Associated Diagnoses: Candidal vulvovaginitis      minocycline (MINOCIN/DYNACIN) 100 MG capsule Take 1 capsule (100 mg) by mouth every 12 hours for 14 days  Qty: 28 capsule, Refills: 0    Associated Diagnoses: Surgical wound infection      oxyCODONE (ROXICODONE) 5 MG tablet Take 1 tablet (5 mg) by mouth every 4 hours as needed for severe pain  Qty: 15 tablet, Refills: 0    Associated Diagnoses: S/p left hip fracture         CONTINUE these medications which have CHANGED    Details   !! apixaban ANTICOAGULANT (ELIQUIS) 2.5 MG tablet Take 1 tablet (2.5 mg) by mouth 2 times daily  Qty: 60 tablet, Refills: 0    Associated Diagnoses: S/p left hip fracture       !! - Potential duplicate medications found. Please discuss with provider.      CONTINUE these medications which have NOT CHANGED    Details   bisacodyl (DULCOLAX) 5 MG EC tablet Take 5-10 mg by mouth daily as needed for constipation      !! ELIQUIS 2.5 MG tablet 2.5 mg 2 times daily       GlipiZIDE (GLUCOTROL PO) Take 5 mg by mouth every morning (before breakfast)      magnesium hydroxide (MILK OF MAGNESIA) 400 MG/5ML suspension Take by mouth daily as needed for constipation or heartburn      potassium chloride (KLOR-CON) 20 MEQ Packet Take 20 mEq by mouth daily      pregabalin (LYRICA) 75 MG capsule Take 1 capsule (75 mg) by mouth 2 times daily  Qty: 60 capsule, Refills: 3    Associated Diagnoses: Neuropathy      ranitidine (ZANTAC) 150 MG tablet TAKE ONE TABLET BY MOUTH TWO TIMES A DAY  Qty: 180 tablet, Refills: 3    Associated Diagnoses: Dyspepsia      simvastatin (ZOCOR) 10 MG tablet Take 1 tablet (10 mg) by mouth At Bedtime  Qty: 90 tablet, Refills: 3    Associated  Diagnoses: Type 2 diabetes mellitus with diabetic nephropathy, without long-term current use of insulin (H)      tiotropium (SPIRIVA) 18 MCG capsule Inhale 1 capsule (18 mcg) into the lungs every evening  Qty: 30 capsule, Refills: 11    Associated Diagnoses: Chronic obstructive pulmonary disease, unspecified COPD type (H)      torsemide (DEMADEX) 20 MG tablet Take 1 tablet (20 mg) by mouth daily  Qty: 30 tablet, Refills: 1    Comments: Discontinue furosemide  Associated Diagnoses: (HFpEF) heart failure with preserved ejection fraction (H)      ACCU-CHEK SMARTVIEW test strip USE 1 STRIP BY IN VITRO ROUTE 2 TIMES DAILY OR AS DIRECTED  Qty: 200 strip, Refills: 0    Associated Diagnoses: Type 2 diabetes mellitus with diabetic nephropathy (H)      ACE/ARB/ARNI NOT PRESCRIBED (INTENTIONAL) Please choose reason not prescribed, below      albuterol (PROAIR HFA, PROVENTIL HFA, VENTOLIN HFA) 108 (90 BASE) MCG/ACT inhaler Inhale 2 puffs into the lungs every 4 hours as needed for shortness of breath / dyspnea or wheezing  Qty: 1 Inhaler, Refills: 5    Associated Diagnoses: COPD (chronic obstructive pulmonary disease) (H)      blood glucose monitoring (NO BRAND SPECIFIED) meter device kit Accucheck Mariza meter; verified with pharmacist  Qty: 1 kit, Refills: 0    Associated Diagnoses: Type 2 diabetes mellitus with diabetic nephropathy (H)      CLINDAMYCIN HCL PO Give 300 mg by mouth as needed for Dental Infection Prophylaxis Give 1 tablet (300mg) 4 times daily as needed. To be given the day prior to dental procedure      denosumab (PROLIA) 60 MG/ML SOLN injection Inject 1 mL (60 mg) Subcutaneous every 6 months  Qty: 1 mL, Refills: 1    Associated Diagnoses: Age-related osteoporosis without current pathological fracture      fluticasone (FLONASE) 50 MCG/ACT spray Spray 1-2 sprays into both nostrils daily as needed for rhinitis or allergies      hydrOXYzine (VISTARIL) 25 MG capsule Take 1 capsule (25 mg) by mouth 2 times daily as  needed for itching  Qty: 180 capsule, Refills: 1    Associated Diagnoses: Itching      Lidocaine (LIDOCARE) 4 % Patch Place 3 patches onto the skin daily as needed for moderate pain      metoprolol tartrate (LOPRESSOR) 25 MG tablet Take 1 tablet (25 mg) by mouth 2 times daily  Qty: 180 tablet, Refills: 3    Comments: Profile Rx: patient will contact pharmacy when needed  Associated Diagnoses: NSTEMI (non-ST elevated myocardial infarction) (H)      ondansetron (ZOFRAN ODT) 4 MG ODT tab Take 1 tablet (4 mg) by mouth every 6 hours as needed for nausea  Qty: 30 tablet, Refills: 1    Associated Diagnoses: Nausea       !! - Potential duplicate medications found. Please discuss with provider.      STOP taking these medications       ASPIRIN PO Comments:   Reason for Stopping:         senna-docusate (SENOKOT-S;PERICOLACE) 8.6-50 MG per tablet Comments:   Reason for Stopping:             Allergies   Allergies   Allergen Reactions     Ambien [Zolpidem Tartrate] Visual Disturbance     Hallucinations and fell out of bed at night.     Penicillins Hives     Definity      Caused a syncopal episode.     Sulfa Drugs Itching     Cymbalta Rash     Fluconazole Rash     Tolerates miconazole suppositories

## 2018-12-26 NOTE — PROGRESS NOTES
Patient discharged at 4:12 PM to [facility] TCU.  IV was discontinued. Pain at time of discharge was 0. Belongings returned to patient.  Discharge instructions and medications reviewed with patient and sent in packet with [facility] transport.  Patient verbalized understanding and all questions were answered.  At time of discharge, patient condition was stable and left the unit [facility] transport.

## 2018-12-26 NOTE — PLAN OF CARE
Alert and oriented.  Vitals stable.  L hip pain managed with PRN Oxy x1 at 2200.  Dressing changed x1 this shift, draining serosanguinous fluid.  Incision closed with staples, otherwise WDL.  IV Zosyn.  Ambulates A1 and walker.  Plan to discharge to TCU tomorrow.

## 2018-12-26 NOTE — PROGRESS NOTES
SW Consult Note:    D/I:  Received discharge orders for patient.  Bed is available for patient at [facility] for today.  Patient is asking for transport to be arranged.  Explained this will be privatep ay and patient is in agreement.  Call placed to [facility] to arrange for wheelchair transport at 1600 for today.  Patient and niece Enedelia updated of the plan and are in agreement with the plan for discharge.  Updated facility and faxed the orders and the PAS.    PAS-RR    D: Per DHS regulation, EVAN completed and submitted PAS-RR to [place] [employer] Direct Connect via the Senior LinkAge Line.  PAS-RR confirmation # is : [id number].    P: Further questions may be directed to Senior LinkAge Line at #[phone], option #4 for PAS-RR staff.    SW placed call to  Respiratory for portable oxygen and informed patient's ride is at 1600 today.  Faxed orders and facesheet to [phone].    Kenrick Murphy, CECY, LGSW

## 2018-12-26 NOTE — PROGRESS NOTES
"BRIEF NUTRITION ASSESSMENT      REASON FOR ASSESSMENT:  LOS      CURRENT DIET AND INTAKE:  Diet:  Regular              Chart reviewed  Note possible discharge to TCU (when bed found)  Pt has been ordering full meals  Breakfast today - pancakes, egg, bagel, orange, coffee, OJ  Flowsheets reflect intake has been ~75%    ANTHROPOMETRICS:  Height: 5'5\"  IBW:  56.8 kg  No wt taken this admit  Weight History:   Wt Readings from Last 10 Encounters:   12/19/18 82.1 kg (181 lb)   11/19/18 82.6 kg (182 lb)   11/15/18 85.3 kg (188 lb)   11/13/18 83.9 kg (185 lb)   11/05/18 83.7 kg (184 lb 9.6 oz)   10/29/18 88.9 kg (195 lb 14.4 oz)   10/25/18 95.1 kg (209 lb 11.2 oz)   08/09/18 66.7 kg (147 lb)   08/06/18 66.9 kg (147 lb 8 oz)   07/18/18 76.7 kg (169 lb)         LABS:  Labs noted      NUTRITION INTERVENTION:  Nutrition Diagnosis:  No nutrition diagnosis at this time.    Implementation:  Nutrition Education ---> Per Provider order if indicated      FOLLOW UP/MONITORING:   Will re-evaluate in 7 - 10 days, or sooner, if re-consulted.          "

## 2018-12-26 NOTE — PROGRESS NOTES
[facility]    Infectious Disease Progress Note    Date of Service (when I saw the patient): 12/26/2018     Assessment & Plan   Helene Gibbs is a 83 year old female who was admitted on 12/19/2018.     Impression:  1. 83 y.o female with history of left hip fracture, s/p left hip edison arthroplasty done on 11/20.   2. Admitted this occasion with persistent drainage from the wound.   3. No fevers or chills.   4. S/p I and D of the left hip, no deep infection suspected cultures from the wound positive for E faecalis and CoNS.   5. UA pos, UC with E coli and E faecalis.   6. PCN listed as allergy, but patient tells me she has tolerated and taken amoxi and Augmentin various times in past. The reaction listed in the chart is hives but patient remembers rash when she was is in 20`s.  7 Renal insuff      Recommendations:   1 Doubt UC significant, and if so adequate tx already completed  2 Only prelim op note, not felt deep, only cx is ? Superficial wd cx, but describes bone fragment ??, ? Po only extended only, CN staph sig not clear is ox R, creat makes extended vanco undesirable  3 Not mildly abnormal  baseline inflamm markers, not much pain  4 Pending further ortho input, assume only superficial and plan 2 weeks po minocin and amoxil          Karlos Antonio MD    Interval History   More awake   Doing ok   1.7 creat   cx same CN staph assuming superficial cx not likely sig, and enterococcus dubiouis also    Physical Exam   Temp: 96.4  F (35.8  C) Temp src: Oral BP: 119/48   Heart Rate: 70 Resp: 18 SpO2: 97 % O2 Device: None (Room air) Oxygen Delivery: 2 LPM  There were no vitals filed for this visit.  Vital Signs with Ranges  Temp:  [95.7  F (35.4  C)-97.5  F (36.4  C)] 96.4  F (35.8  C)  Heart Rate:  [70-90] 70  Resp:  [16-20] 18  BP: (119-134)/(48-68) 119/48  SpO2:  [95 %-99 %] 97 %    Constitutional: Awake, alert, cooperative, no apparent distress  Lungs: Clear to auscultation bilaterally, no crackles  or wheezing  Cardiovascular: Regular rate and rhythm, normal S1 and S2, and no murmur noted  Abdomen: Normal bowel sounds, soft, non-distended, non-tender  Skin: No rashes, no cyanosis, no edema  Other:    Medications       amoxicillin  500 mg Oral Q12H SG     apixaban ANTICOAGULANT  2.5 mg Oral BID     ferrous sulfate  325 mg Oral Every Other Day     glipiZIDE  5 mg Oral QAM AC     insulin aspart  1-7 Units Subcutaneous TID AC     insulin aspart  1-5 Units Subcutaneous At Bedtime     metoprolol tartrate  25 mg Oral BID     miconazole  100 mg Vaginal At Bedtime     minocycline  100 mg Oral Q12H SG     potassium chloride  20 mEq Oral Daily     pregabalin  75 mg Oral BID     ranitidine  150 mg Oral Daily     senna-docusate  2 tablet Oral BID     simvastatin  10 mg Oral At Bedtime     sodium chloride (PF)  3 mL Intracatheter Q8H     torsemide  20 mg Oral Daily     umeclidinium  1 puff Inhalation QPM       Data   All microbiology laboratory data reviewed.  Recent Labs   Lab Test 12/26/18  0620 12/25/18  0612 12/24/18  0746  12/22/18  0804   WBC  --  6.7 7.8  --  6.4   HGB 8.2* 7.7* 7.9*   < > 7.0*   HCT  --  27.0* 27.6*  --  24.3*   MCV  --  86 85  --  84   PLT  --  227 257  --  233    < > = values in this interval not displayed.     Recent Labs   Lab Test 12/26/18  0620 12/25/18  0612 12/24/18  0746   CR 1.70* 1.87* 1.97*     Recent Labs   Lab Test 12/25/18  0612   SED 48*     Recent Labs   Lab Test 12/21/18  1402 12/19/18  2230 10/23/18  0911 10/22/18  2300 10/19/18  2300 07/16/18  1740 04/16/18  1352 04/06/18  0820 10/19/16  2035   CULT Light growth  Coagulase negative Staphylococcus  *  Light growth  Enterococcus faecalis  *  Susceptibility testing requested by  Dr Monzon [phone] for Coag Negative Staph. 12.24.18 1000. IRASEMA    Culture negative monitoring continues 10,000 to 50,000 colonies/mL  Escherichia coli  *  10,000 to 50,000 colonies/mL  Enterococcus faecalis  * Light growth  Escherichia coli  *   Light growth  Strain 2  Escherichia coli  *  Plus  Light growth  Normal pal   No growth <10,000 colonies/mL  Escherichia coli  * No growth >100,000 colonies/mL  Escherichia coli  * 10,000 to 50,000 colonies/mL  mixed urogenital pal   10,000 to 50,000 colonies/mL mixed urogenital pal Susceptibility testing not   routinely done

## 2018-12-26 NOTE — PROGRESS NOTES
Orthopedic Surgery  Helene Gibbs  2018  Admit Date:  2018  POD # 5  S/P left hip washout, chronic draining wound **Superficial infection only**    Patient resting comfortably in chair, helped transfer to bed with walker.    Pain controlled.  Tolerating oral intake.    Denies nausea or vomiting  Denies chest pain or shortness of breath  No events overnight.     Alert and orient to person, place, and time.  Vital Sign Ranges  Temperature Temp  Av.4  F (35.8  C)  Min: 95.7  F (35.4  C)  Max: 97.5  F (36.4  C)   Blood pressure Systolic (24hrs), Av , Min:119 , Max:134        Diastolic (24hrs), Av, Min:48, Max:68      Pulse No Data Recorded   Respirations Resp  Av.5  Min: 16  Max: 20   Pulse oximetry SpO2  Av.7 %  Min: 95 %  Max: 99 %       Dressing is clean, dry, and intact.   Minimal erythema of the surrounding skin.   Bilateral calves are soft, non-tender.  Bilateral lower extremity is NVI.  Sensation intact bilateral lower extremities  Active dorsi and plantar flexion bilaterally  +Dp pulse    Labs:  Recent Labs   Lab Test 18  0620 18  0612 18  0746   POTASSIUM 3.7 3.8 4.1     Recent Labs   Lab Test 18  0620 18  0612 18  0746   HGB 8.2* 7.7* 7.9*     Recent Labs   Lab Test 10/19/18  2156 18  0800 18  1101   INR 1.54* 1.00 0.97     Recent Labs   Lab Test 18  0612 18  0746 18  0804    257 233       A/P  1. Plan   Continue Eliquis for DVT prophylaxis.     Mobilize with PT/OT    WBAT.   Daily dressing changes     Continue current pain regiment.    2. Disposition   Anticipate d/c to TCU today     Azul Crow PA-C

## 2018-12-26 NOTE — PROVIDER NOTIFICATION
MD Notification    Notified Person: MD    Notified Person Name: Low    Notification Date/Time: 12/25/18 2110    Notification Interaction: Pt complaining of burning and itching in vaginal area, states she has frequent yeast infections with antibiotics.  Requesting antifungal for relief.     Purpose of Notification:    Orders Received:    Comments:

## 2018-12-26 NOTE — PLAN OF CARE
Discharge Planner OT   Patient plan for discharge: would like to go home but open to TCU   Current status: pt completed supine to sit EOB SBA, SBA sit to stand, pt amb with FWW CGA to/from bathroom, toilet transfer with RTS SBA, SBA clothing management, SBA standing at sink for ADLS. Pt fatigue with activity  Barriers to return to prior living situation: current level of A, fall risk, post op precautions   Recommendations for discharge: TCU per plan established by the Occupational Therapist  Rationale for recommendations: Pt would benefit from continued skilled OT services to improve independence and safety with ADL's and functional transfers as pt is not at baseline.         Entered by: Denise Jonas 12/26/2018 8:34 AM      Pt discharging to TCU today, GOALS NOT MET, see discharge summary

## 2018-12-26 NOTE — PROGRESS NOTES
Cross Cover:  Patient with complaint of vaginal yeast infxn. Allergy to fluconazole with rash, tolerates miconazole and requests monistat vaginal suppository.  Plan: added miconazole 100mg suppository at bedtime.

## 2018-12-26 NOTE — PLAN OF CARE
Discharge Planner PT   Patient plan for discharge: Return to [facility]  Current status: Patient supine in bed upon arrival; agreeable to therapy. Re-educated on posterior hip precautions. Currently on 2L of O2. Performed supine to sit with Mod A x 1. Sit <> stand transfer to FWW x 2 completed with Min A x 1. Ambulated 60 ft x 1 with FWW and CGA; denies SOB. Slow pace. Up in chair at end of session.  Barriers to return to prior living situation: Requires assist for all mobility, lives alone  Recommendations for discharge: TCU per plan established by the PT.  Rationale for recommendations: Assistance currently required for all mobility, would be able to return to [facility] is this is provided. Will need ongoing skilled PT intervention to improve independence and safety with functional mobility skills prior to returning home.   Entered by: Peg Uriostegui 12/26/2018 11:03 AM

## 2018-12-26 NOTE — PROGRESS NOTES
"Clinic Care Coordination Contact  Chart Review    Referral: CTS handoff    Data: Per chart review, it appears that patient remains in [facility] at this time. (Patient was admitted to [facility] on 12/19/18 for \"low hemo\" and \"anemia\".) Per hospitalization documentation, plans are underway for patient to go to TCU post hospitalization.    Plan: SW-CCC will plan to follow post hospitalization as indicated for community resource needs.    [facility] Care Coordination  Clinic: [facility]   Email: [email or web address]  Tele: [phone]      Addendum  12/27/18  Clinic Care Coordination Contact  Care Coordination Transition Communication    Referral Source: IP Report    Clinical Data: Patient was hospitalized at [facility] from 12/19/18 to 12/26/18 with diagnosis of Acute hypoxic respiratory failure due to presumed acute on chronic diastolic CHF, Iron deficiency anemia, Left hip surgical wound infection, Greater trochanteric fracture, Bacteruria, Vulvovaginal candidiasis, DM II with nephropathy, CKD stage III, COPD, Hx Chronic A-fib s/p ablation, HTN and Hyperlipidemia.     Transition to Facility:              Facility Name: [facility]              Contact name and phone number/fax: Elodia ([phone])  Writer called [facility] this morning. Writer left a message for TCU  Elodia asking to be kept updated regarding discharge plans/needs for patient.    Plan: SW Care Coordinator will await notification from facility staff informing SW Care Coordinator of patient's discharge plans/needs. SW Care Coordinator will review chart and outreach to facility staff every 4 weeks and as needed.     NICOLE Esposiot  [facility] Care Coordination  Clinic: [facility]   Email: [email or web address]  Tele: [phone]                        "

## 2018-12-26 NOTE — PROGRESS NOTES
Patient has been assessed for Home Oxygen needs. Oxygen readings:    *Pulse oximetry (SpO2) =92% on room air at rest while awake.    *SpO2 improved to 96% on 2 liters/minute at rest.    *SpO2 = 86% on room air during activity/with exercise.    *SpO2 improved to 93% on 2 Liters/minute during activity/with exercise.

## 2018-12-27 NOTE — PROGRESS NOTES
[facility]  PRIMARY CARE PROVIDER AND CLINIC:  Neisha Esparza MARIMAR [address] / [place]                [place] [address]  Chief Complaint   Patient presents with     Hospital F/U     [place] Medical Record Number:  [id number]  Place of Service where encounter took place:  [facility] ([facility] [[id number]]    HPI:    Helene Gibbs is a 83 year old  (1935),admitted to the above facility from  [facility].  Hospital stay 12/19/18 through 12/26/18.  Admitted to this facility for  rehab, medical management and nursing care.  HPI information obtained from: facility chart records, facility staff, patient report and [facility] chart review.      Hospital Course:  Helene Gibbs is a 83 year old female with DM type 2, COPD with pulm htn, CKD stage 3 who presents with anemia on preop exam today -- hgb 6.6 and preop for left hip I & D after Left hip fracture with ORIF 11/20/18 and persistent wound drainage.  Acute hypoxic respiratory failure   Presumed acute on chronic diastolic CHF  Placed on oxygen shortly after admission.  Initiated on lasix diuresis 12/24 with findings of volume overload, will receive last dose of IV lasix on day of discharge.  Follow daily weights and continue PTA torsemide.  TTE 3/2018 with EF 55-60%, moderate TR, mild pulm HTN.  Continued to have mild desaturation with activity at discharge, wean as able with ongoing diuresis.  Iron Deficiency Anemia  Hgb 6.6 on admission with iron studies consistent with severe deficiency, s/p 1 unit PRBC and single dose Venofer.  Repeat PRBC 12/22.  Hematoma seen in OR, EBL 50ml.  GI consulted, not interested in EGD/colonoscopy, will follow up outpatient for capsule study.  Continue oral iron sulfate 325 mg every other day.  Left Hip surgical wound infection  Greater trochanteric fracture  Underwent left hip ORIF 10/2018 with persistent drainage since.  Underwent left hip I&D and excision of bone fragment (hematoma  apparently seen during surgery) 12/21.  Culture growing E.faecalis and Coag Neg Staph.  Per discussion with Ortho 12/26, infection is not felt to be deep.  Per ID, continue on oral amoxicillin (reported hives allergy, but tolerated this in hospital) and minicycline x2 weeks.  Follow up with Ortho as scheduled.  Bacteruria  UA with large leuk esterase, elevated WBCs and mucous.  Culture growing only 10K colonies E.coli and E.faecalis, not felt to be of significance.  Vulvovaginal candidiasis  Reporting yeast infection symptoms due to antibiotics.  Initiated vaginal suppository 12/25, will continue for 1 week give long duration of antibiotic plan.  Type 2 diabetes mellitus with diabetic nephropathy (H)  Continue PTA glipizide  CKD (chronic kidney disease) stage 3, GFR 30-59 ml/min (H)  Cr fluctuated significantly over past year, ranging 1.1-1.8 (baseline seems closer to 1.1).  Cr peak of 1.97 on 12/24, now improving with diuresis, suggesting cardiorenal syndrome.  Repeat BMP in several days.  COPD (chronic obstructive pulmonary disease) (H)  Continue Incruse Ellipta   Chronic atrial fib, S/P ablation  Continue Eliquis and metoprolol.  HTN  Continue PTA metoprolol, lisinopril.  Hyperlipidemia  continue PTA statin    Current issues are:  Acute and chronic respiratory failure with hypoxia (H)  Acute on chronic diastolic CHF (congestive heart failure) (H)  Essential hypertension  Patient now at TCU, remains on O2 - recent SBP  and Sats 87% on room air. Down 12 lbs in six weeks and No fevers. Denies cough or chest pain. Takes Demadex 20 mg daily, KCL and lopressor.     Iron deficiency anemia due to chronic blood loss  Last Hgb improved, on iron.   Lab Results   Component Value Date    HGB 8.2 12/26/2018    HGB 7.7 12/25/2018        Left hip postoperative wound infection  Now s/p I and D. Serous drainage from incision left hip. Staples intact, no redness surrounding wound. Antibiotics as ordered. No fevers.   Lab  Results   Component Value Date    WBC 6.7 12/25/2018    WBC 7.8 12/24/2018        Bacteriuria  Vulvovaginal candidiasis  Antibiotics as above and miconazole vaginal supp as ordered. No symptoms.     Type 2 diabetes mellitus with diabetic nephropathy, without long-term current use of insulin (H)  CKD (chronic kidney disease) stage 3, GFR 30-59 ml/min (H)  Will check BG twice daily at TCU. On oral agents.   Lab Results   Component Value Date    A1C 5.5 10/19/2018    A1C 6.9 07/21/2018     Lab Results   Component Value Date    CR 1.70 12/26/2018     Lab Results   Component Value Date    POTASSIUM 3.7 12/26/2018    POTASSIUM 3.8 12/25/2018       Chronic obstructive pulmonary disease, unspecified COPD type (H)  Now on oxygen. Using albuterol prn and Spiriva.     Hyperlipidemia, unspecified hyperlipidemia type  On statin.   Lab Results   Component Value Date    LDL 40 01/18/2018    LDL 36 01/04/2018       Physical deconditioning  Back at TCU for rehab. Up with walker and SBA.       CODE STATUS/ADVANCE DIRECTIVES DISCUSSION:   DNR / DNI  Patient's living condition: lives alone    ALLERGIES:Ambien [zolpidem tartrate]; Penicillins; Definity; Sulfa drugs; Cymbalta; and Fluconazole  PAST MEDICAL HISTORY:  has a past medical history of Anemia, Ankle arthritis, Arthritis, Back pain, Bell's palsy (9/08), Breast CA (H) (2004-), Breast cancer (H) (2004), CKD (chronic kidney disease), Colon polyps, Congestive heart failure (H), COPD (chronic obstructive pulmonary disease) (H), Coronary artery disease, DM (diabetes mellitus) (H), GERD (gastroesophageal reflux disease), Hyperlipidemia, Hypertension, Lumbar spinal stenosis, Nicotine dependence, Obesity, Osteoporosis, Other chronic pain, Pacemaker, Permanent atrial fibrillation (H) (8/2008), Pleural effusion, Pulmonary hypertension (H), PVD (peripheral vascular disease) (H), Rectal stenosis, Tobacco abuse, and Tricuspid regurgitation. She also has no past medical history of Difficult  intubation, Malignant hyperthermia, or PONV (postoperative nausea and vomiting).  PAST SURGICAL HISTORY:  has a past surgical history that includes arthroscopy shoulder rt/lt (1999, 2004); joint replacemtn, knee rt/lt; COLONOSCOPY THRU STOMA, DIAGNOSTIC (? 2005); MAMMOGRAM, SCREENING (1/2009); DEXA, BONE DENSITY, AXIAL SKEL; Lumpectomy breast; Implant pacemaker (10/2012); EP Ablation AV node (10/2012); Phacoemulsification clear cornea with standard intraocular lens implant (Left, 7/16/2015); Laparoscopic cholecystectomy with cholangiograms (N/A, 12/14/2015); Colonoscopy (N/A, 10/7/2016); Phacoemulsification clear cornea with toric intraocular lens implant (Right, 5/9/2017); Esophagoscopy, gastroscopy, duodenoscopy (EGD), combined (N/A, 8/10/2017); Bone marrow biopsy, bone specimen, needle/trocar (N/A, 8/29/2017); Arthroplasty hip (Left, 10/20/2018); and Irrigation and debridement lower extremity, combined (Left, 12/21/2018).  FAMILY HISTORY: family history includes C.A.D. in her father; Diabetes in her mother; Hypertension in her mother.  SOCIAL HISTORY:  reports that she quit smoking about 6 months ago. Her smoking use included cigarettes. She has a 11.25 pack-year smoking history. she has never used smokeless tobacco. She reports that she does not drink alcohol or use drugs.    Post Discharge Medication Reconciliation Status: discharge medications reconciled and changed, per note/orders (see AVS).  Current Outpatient Medications   Medication Sig Dispense Refill     ACCU-CHEK SMARTVIEW test strip USE 1 STRIP BY IN VITRO ROUTE 2 TIMES DAILY OR AS DIRECTED 200 strip 0     ACE/ARB/ARNI NOT PRESCRIBED (INTENTIONAL) Please choose reason not prescribed, below       acetaminophen (TYLENOL) 500 MG tablet Take 1,000 mg by mouth 3 times daily And daily PRN pain       albuterol (PROAIR HFA, PROVENTIL HFA, VENTOLIN HFA) 108 (90 BASE) MCG/ACT inhaler Inhale 2 puffs into the lungs every 4 hours as needed for shortness of breath  / dyspnea or wheezing 1 Inhaler 5     amoxicillin (AMOXIL) 500 MG capsule Take 1 capsule (500 mg) by mouth every 12 hours for 14 days 28 capsule 0     apixaban ANTICOAGULANT (ELIQUIS) 2.5 MG tablet Take 1 tablet (2.5 mg) by mouth 2 times daily 60 tablet 0     bisacodyl (DULCOLAX) 5 MG EC tablet Take 5-10 mg by mouth daily as needed for constipation       blood glucose monitoring (NO BRAND SPECIFIED) meter device kit Accucheck Mariza meter; verified with pharmacist 1 kit 0     CLINDAMYCIN HCL PO Give 300 mg by mouth as needed for Dental Infection Prophylaxis Give 1 tablet (300mg) 4 times daily as needed. To be given the day prior to dental procedure       denosumab (PROLIA) 60 MG/ML SOLN injection Inject 1 mL (60 mg) Subcutaneous every 6 months 1 mL 1     ferrous sulfate (FEROSUL) 325 (65 Fe) MG tablet Take 1 tablet (325 mg) by mouth every other day 15 tablet 0     fluticasone (FLONASE) 50 MCG/ACT spray Spray 1-2 sprays into both nostrils daily as needed for rhinitis or allergies       GlipiZIDE (GLUCOTROL PO) Take 5 mg by mouth every morning (before breakfast)       hydrOXYzine (VISTARIL) 25 MG capsule Take 1 capsule (25 mg) by mouth 2 times daily as needed for itching 180 capsule 1     Lidocaine (LIDOCARE) 4 % Patch Place 3 patches onto the skin daily as needed for moderate pain       metoprolol tartrate (LOPRESSOR) 25 MG tablet Take 1 tablet (25 mg) by mouth 2 times daily 180 tablet 3     miconazole (MICATIN) 100 MG vaginal suppository Place 1 suppository (100 mg) vaginally At Bedtime for 6 days 6 suppository 0     minocycline (MINOCIN/DYNACIN) 100 MG capsule Take 1 capsule (100 mg) by mouth every 12 hours for 14 days 28 capsule 0     ondansetron (ZOFRAN ODT) 4 MG ODT tab Take 1 tablet (4 mg) by mouth every 6 hours as needed for nausea 30 tablet 1     oxyCODONE (ROXICODONE) 5 MG tablet Take 1 tablet (5 mg) by mouth every 4 hours as needed for severe pain 15 tablet 0     potassium chloride (KLOR-CON) 20 MEQ Packet  Take 20 mEq by mouth daily       ranitidine (ZANTAC) 150 MG tablet TAKE ONE TABLET BY MOUTH TWO TIMES A  tablet 3     simvastatin (ZOCOR) 10 MG tablet Take 1 tablet (10 mg) by mouth At Bedtime 90 tablet 3     torsemide (DEMADEX) 20 MG tablet Take 1 tablet (20 mg) by mouth daily 30 tablet 1     magnesium hydroxide (MILK OF MAGNESIA) 400 MG/5ML suspension Take by mouth daily as needed for constipation or heartburn       pregabalin (LYRICA) 75 MG capsule Take 1 capsule (75 mg) by mouth 2 times daily 60 capsule 0     tiotropium (SPIRIVA) 18 MCG capsule Inhale 1 capsule (18 mcg) into the lungs every evening 30 capsule 11       ROS:  10 point ROS of systems including Constitutional, Eyes, Respiratory, Cardiovascular, Gastroenterology, Genitourinary, Integumentary, Musculoskeletal, Psychiatric were all negative except for pertinent positives noted in my HPI.    Exam:  /65   Pulse 70   Temp 98.2  F (36.8  C)   Resp 18   Wt 83.7 kg (184 lb 8 oz)   LMP  (LMP Unknown)   SpO2 95%   BMI 30.70 kg/m    GENERAL APPEARANCE:  Alert, in no distress, pleasant, cooperative, oriented x self, place and recent events  EYES:  EOM, lids, pupils and irises normal, sclera clear and conjunctiva normal, no discharge or mattering on lids or lashes noted  ENT:  Mouth normal, moist mucous membranes, nose normal without drainage or crusting, external ears without lesions, hearing acuity intact  NECK: supple, symmetrical  RESP:  respiratory effort and palpation of chest normal, no chest wall tenderness, no respiratory distress, Lung sounds diminished at bases, patient is on room air and sats low at 87%  CV:  Palpation and auscultation of heart done, rate and rhythm controlled and regular, no murmur, no rub or gallop. Edema +1 pitting bilateral lower extremities.   ABDOMEN:  normal bowel sounds, soft, nontender.  M/S:   Gait and station walks with assist , no tenderness or swelling of the joints; able to move all extremities    SKIN:  Inspection and palpation of skin and subcutaneous tissue left hip incision intact with staples no redness but serous drainage on dressing  NEURO: cranial nerves 2-12 grossly intact, no facial asymmetry, no speech deficits and able to follow directions, moves all extremities symmetrically  PSYCH:  insight and judgement intact, memory intact, affect and mood normal     Lab/Diagnostic data:  CBC RESULTS:   Recent Labs   Lab Test 12/26/18  0620 12/25/18  0612 12/24/18  0746   WBC  --  6.7 7.8   RBC  --  3.14* 3.24*   HGB 8.2* 7.7* 7.9*   HCT  --  27.0* 27.6*   MCV  --  86 85   MCH  --  24.5* 24.4*   MCHC  --  28.5* 28.6*   RDW  --  19.3* 18.0*   PLT  --  227 257       Last Basic Metabolic Panel:  Recent Labs   Lab Test 12/26/18  0620 12/25/18  0612    140   POTASSIUM 3.7 3.8   CHLORIDE 105 102   GERARD 9.1 9.0   CO2 31 33*   BUN 27 31*   CR 1.70* 1.87*   GLC 99 110*       Liver Function Studies -   Recent Labs   Lab Test 12/25/18  0612 12/24/18  0746 12/19/18 2053 12/19/18  1133   PROTTOTAL  --   --  6.5* 7.1   ALBUMIN 2.4* 2.5* 2.8* 3.1*   BILITOTAL  --   --  0.6 0.6   ALKPHOS  --   --  82 74   AST  --   --  6 6   ALT  --   --  10 9       TSH   Date Value Ref Range Status   12/19/2018 3.62 0.40 - 4.00 mU/L Final   04/25/2017 1.56 0.40 - 4.00 mU/L Final       Lab Results   Component Value Date    A1C 5.5 10/19/2018    A1C 6.9 07/21/2018       ASSESSMENT/PLAN:  Acute and chronic respiratory failure with hypoxia (H)  Acute on chronic diastolic CHF (congestive heart failure) (H)  Essential hypertension  Acute on chronic. Meds, vs, wt as ordered. Labs weekly f/u with status next week. O2 to keep sats above 90% wean as able.     Iron deficiency anemia due to chronic blood loss  Ongoing issue. Hgb weekly monitor    Left hip postoperative wound infection  S/p I and D. Antibiotics, ortho f/u as ordered. Monitor labs.     Bacteriuria  Vulvovaginal candidiasis  Improving symptoms. Meds as above. Monitor.     Type 2  diabetes mellitus with diabetic nephropathy, without long-term current use of insulin (H)  CKD (chronic kidney disease) stage 3, GFR 30-59 ml/min (H)  Chronic, stable. Meds as ordered. BG BID. BMP weekly. Avoid nephrotoxic meds.     Chronic obstructive pulmonary disease, unspecified COPD type (H)  Chronic, meds and o2 as ordered. Monitor.     Hyperlipidemia, unspecified hyperlipidemia type  Chronic, statin as PTA.     Physical deconditioning  Acute on chronic. Therapies, f/u with progress next week.     Orders:  1. Change Tylenol to 1000 mg PO TID and daily PRN pain  2. BG check BID at alternating times diagnosis DM  3. Hold Prolia while at U  4. DuoNebs 1 neb QID x 7 days diagnosis resp failure  5. BMP, CBC weekly on Mondays diagnosis HF, anemia    Total time spent with patient visit at the skilled nursing facility was 35 min including patient visit and review of past records. Greater than 50% of total time spent with counseling and coordinating care due to review of history, current status and concerns and POC to address them as noted above    Electronically signed by:  ALLEN Jeronimo CNP

## 2018-12-27 NOTE — PLAN OF CARE
Physical Therapy Discharge Summary    Reason for therapy discharge:    Discharged to transitional care facility.    Progress towards therapy goal(s). See goals on Care Plan in [facility] electronic health record for goal details.  Goals partially met.  Barriers to achieving goals:   discharge from facility.    Therapy recommendation(s):    Continued therapy is recommended.  Rationale/Recommendations:  Assistance currently required for all mobility, would be able to return to [facility] is this is provided. Will need ongoing skilled PT intervention to improve independence and safety with functional mobility skills prior to returning home.

## 2018-12-27 NOTE — LETTER
12/27/2018        RE: Helene Gibbs  [address]  [facility] [address]        [facility]  PRIMARY CARE PROVIDER AND CLINIC:  Neisha Esparza [address] / [place]                [place] [address]  Chief Complaint   Patient presents with     Hospital F/U     [facility] Record Number:  [id number]  Place of Service where encounter took place:  [facility] ([facility] [[id number]]    HPI:    Helene Gibbs is a 83 year old  (1935),admitted to the above facility from  [facility].  Hospital stay 12/19/18 through 12/26/18.  Admitted to this facility for  rehab, medical management and nursing care.  HPI information obtained from: facility chart records, facility staff, patient report and [facility] chart review.      Hospital Course:  Helene Gibbs is a 83 year old female with DM type 2, COPD with pulm htn, CKD stage 3 who presents with anemia on preop exam today -- hgb 6.6 and preop for left hip I & D after Left hip fracture with ORIF 11/20/18 and persistent wound drainage.  Acute hypoxic respiratory failure   Presumed acute on chronic diastolic CHF  Placed on oxygen shortly after admission.  Initiated on lasix diuresis 12/24 with findings of volume overload, will receive last dose of IV lasix on day of discharge.  Follow daily weights and continue PTA torsemide.  TTE 3/2018 with EF 55-60%, moderate TR, mild pulm HTN.  Continued to have mild desaturation with activity at discharge, wean as able with ongoing diuresis.  Iron Deficiency Anemia  Hgb 6.6 on admission with iron studies consistent with severe deficiency, s/p 1 unit PRBC and single dose Venofer.  Repeat PRBC 12/22.  Hematoma seen in OR, EBL 50ml.  GI consulted, not interested in EGD/colonoscopy, will follow up outpatient for capsule study.  Continue oral iron sulfate 325 mg every other day.  Left Hip surgical wound infection  Greater trochanteric fracture  Underwent left hip ORIF 10/2018 with  persistent drainage since.  Underwent left hip I&D and excision of bone fragment (hematoma apparently seen during surgery) 12/21.  Culture growing E.faecalis and Coag Neg Staph.  Per discussion with Ortho 12/26, infection is not felt to be deep.  Per ID, continue on oral amoxicillin (reported hives allergy, but tolerated this in hospital) and minicycline x2 weeks.  Follow up with Ortho as scheduled.  Bacteruria  UA with large leuk esterase, elevated WBCs and mucous.  Culture growing only 10K colonies E.coli and E.faecalis, not felt to be of significance.  Vulvovaginal candidiasis  Reporting yeast infection symptoms due to antibiotics.  Initiated vaginal suppository 12/25, will continue for 1 week give long duration of antibiotic plan.  Type 2 diabetes mellitus with diabetic nephropathy (H)  Continue PTA glipizide  CKD (chronic kidney disease) stage 3, GFR 30-59 ml/min (H)  Cr fluctuated significantly over past year, ranging 1.1-1.8 (baseline seems closer to 1.1).  Cr peak of 1.97 on 12/24, now improving with diuresis, suggesting cardiorenal syndrome.  Repeat BMP in several days.  COPD (chronic obstructive pulmonary disease) (H)  Continue Incruse Ellipta   Chronic atrial fib, S/P ablation  Continue Eliquis and metoprolol.  HTN  Continue PTA metoprolol, lisinopril.  Hyperlipidemia  continue PTA statin    Current issues are:  Acute and chronic respiratory failure with hypoxia (H)  Acute on chronic diastolic CHF (congestive heart failure) (H)  Essential hypertension  Patient now at TCU, remains on O2 - recent SBP  and Sats 87% on room air. Down 12 lbs in six weeks and No fevers. Denies cough or chest pain. Takes Demadex 20 mg daily, KCL and lopressor.     Iron deficiency anemia due to chronic blood loss  Last Hgb improved, on iron.   Lab Results   Component Value Date    HGB 8.2 12/26/2018    HGB 7.7 12/25/2018        Left hip postoperative wound infection  Now s/p I and D. Serous drainage from incision left hip.  Staples intact, no redness surrounding wound. Antibiotics as ordered. No fevers.   Lab Results   Component Value Date    WBC 6.7 12/25/2018    WBC 7.8 12/24/2018        Bacteriuria  Vulvovaginal candidiasis  Antibiotics as above and miconazole vaginal supp as ordered. No symptoms.     Type 2 diabetes mellitus with diabetic nephropathy, without long-term current use of insulin (H)  CKD (chronic kidney disease) stage 3, GFR 30-59 ml/min (H)  Will check BG twice daily at TCU. On oral agents.   Lab Results   Component Value Date    A1C 5.5 10/19/2018    A1C 6.9 07/21/2018     Lab Results   Component Value Date    CR 1.70 12/26/2018     Lab Results   Component Value Date    POTASSIUM 3.7 12/26/2018    POTASSIUM 3.8 12/25/2018       Chronic obstructive pulmonary disease, unspecified COPD type (H)  Now on oxygen. Using albuterol prn and Spiriva.     Hyperlipidemia, unspecified hyperlipidemia type  On statin.   Lab Results   Component Value Date    LDL 40 01/18/2018    LDL 36 01/04/2018       Physical deconditioning  Back at TCU for rehab. Up with walker and SBA.       CODE STATUS/ADVANCE DIRECTIVES DISCUSSION:   DNR / DNI  Patient's living condition: lives alone    ALLERGIES:Ambien [zolpidem tartrate]; Penicillins; Definity; Sulfa drugs; Cymbalta; and Fluconazole  PAST MEDICAL HISTORY:  has a past medical history of Anemia, Ankle arthritis, Arthritis, Back pain, Bell's palsy (9/08), Breast CA (H) (2004-), Breast cancer (H) (2004), CKD (chronic kidney disease), Colon polyps, Congestive heart failure (H), COPD (chronic obstructive pulmonary disease) (H), Coronary artery disease, DM (diabetes mellitus) (H), GERD (gastroesophageal reflux disease), Hyperlipidemia, Hypertension, Lumbar spinal stenosis, Nicotine dependence, Obesity, Osteoporosis, Other chronic pain, Pacemaker, Permanent atrial fibrillation (H) (8/2008), Pleural effusion, Pulmonary hypertension (H), PVD (peripheral vascular disease) (H), Rectal stenosis, Tobacco  abuse, and Tricuspid regurgitation. She also has no past medical history of Difficult intubation, Malignant hyperthermia, or PONV (postoperative nausea and vomiting).  PAST SURGICAL HISTORY:  has a past surgical history that includes arthroscopy shoulder rt/lt (1999, 2004); joint replacemtn, knee rt/lt; COLONOSCOPY THRU STOMA, DIAGNOSTIC (? 2005); MAMMOGRAM, SCREENING (1/2009); DEXA, BONE DENSITY, AXIAL SKEL; Lumpectomy breast; Implant pacemaker (10/2012); EP Ablation AV node (10/2012); Phacoemulsification clear cornea with standard intraocular lens implant (Left, 7/16/2015); Laparoscopic cholecystectomy with cholangiograms (N/A, 12/14/2015); Colonoscopy (N/A, 10/7/2016); Phacoemulsification clear cornea with toric intraocular lens implant (Right, 5/9/2017); Esophagoscopy, gastroscopy, duodenoscopy (EGD), combined (N/A, 8/10/2017); Bone marrow biopsy, bone specimen, needle/trocar (N/A, 8/29/2017); Arthroplasty hip (Left, 10/20/2018); and Irrigation and debridement lower extremity, combined (Left, 12/21/2018).  FAMILY HISTORY: family history includes C.A.D. in her father; Diabetes in her mother; Hypertension in her mother.  SOCIAL HISTORY:  reports that she quit smoking about 6 months ago. Her smoking use included cigarettes. She has a 11.25 pack-year smoking history. she has never used smokeless tobacco. She reports that she does not drink alcohol or use drugs.    Post Discharge Medication Reconciliation Status: discharge medications reconciled and changed, per note/orders (see AVS).  Current Outpatient Medications   Medication Sig Dispense Refill     ACCU-CHEK SMARTVIEW test strip USE 1 STRIP BY IN VITRO ROUTE 2 TIMES DAILY OR AS DIRECTED 200 strip 0     ACE/ARB/ARNI NOT PRESCRIBED (INTENTIONAL) Please choose reason not prescribed, below       acetaminophen (TYLENOL) 500 MG tablet Take 1,000 mg by mouth 3 times daily And daily PRN pain       albuterol (PROAIR HFA, PROVENTIL HFA, VENTOLIN HFA) 108 (90 BASE) MCG/ACT  inhaler Inhale 2 puffs into the lungs every 4 hours as needed for shortness of breath / dyspnea or wheezing 1 Inhaler 5     amoxicillin (AMOXIL) 500 MG capsule Take 1 capsule (500 mg) by mouth every 12 hours for 14 days 28 capsule 0     apixaban ANTICOAGULANT (ELIQUIS) 2.5 MG tablet Take 1 tablet (2.5 mg) by mouth 2 times daily 60 tablet 0     bisacodyl (DULCOLAX) 5 MG EC tablet Take 5-10 mg by mouth daily as needed for constipation       blood glucose monitoring (NO BRAND SPECIFIED) meter device kit Accucheck Mariza meter; verified with pharmacist 1 kit 0     CLINDAMYCIN HCL PO Give 300 mg by mouth as needed for Dental Infection Prophylaxis Give 1 tablet (300mg) 4 times daily as needed. To be given the day prior to dental procedure       denosumab (PROLIA) 60 MG/ML SOLN injection Inject 1 mL (60 mg) Subcutaneous every 6 months 1 mL 1     ferrous sulfate (FEROSUL) 325 (65 Fe) MG tablet Take 1 tablet (325 mg) by mouth every other day 15 tablet 0     fluticasone (FLONASE) 50 MCG/ACT spray Spray 1-2 sprays into both nostrils daily as needed for rhinitis or allergies       GlipiZIDE (GLUCOTROL PO) Take 5 mg by mouth every morning (before breakfast)       hydrOXYzine (VISTARIL) 25 MG capsule Take 1 capsule (25 mg) by mouth 2 times daily as needed for itching 180 capsule 1     Lidocaine (LIDOCARE) 4 % Patch Place 3 patches onto the skin daily as needed for moderate pain       metoprolol tartrate (LOPRESSOR) 25 MG tablet Take 1 tablet (25 mg) by mouth 2 times daily 180 tablet 3     miconazole (MICATIN) 100 MG vaginal suppository Place 1 suppository (100 mg) vaginally At Bedtime for 6 days 6 suppository 0     minocycline (MINOCIN/DYNACIN) 100 MG capsule Take 1 capsule (100 mg) by mouth every 12 hours for 14 days 28 capsule 0     ondansetron (ZOFRAN ODT) 4 MG ODT tab Take 1 tablet (4 mg) by mouth every 6 hours as needed for nausea 30 tablet 1     oxyCODONE (ROXICODONE) 5 MG tablet Take 1 tablet (5 mg) by mouth every 4 hours as  needed for severe pain 15 tablet 0     potassium chloride (KLOR-CON) 20 MEQ Packet Take 20 mEq by mouth daily       ranitidine (ZANTAC) 150 MG tablet TAKE ONE TABLET BY MOUTH TWO TIMES A  tablet 3     simvastatin (ZOCOR) 10 MG tablet Take 1 tablet (10 mg) by mouth At Bedtime 90 tablet 3     torsemide (DEMADEX) 20 MG tablet Take 1 tablet (20 mg) by mouth daily 30 tablet 1     magnesium hydroxide (MILK OF MAGNESIA) 400 MG/5ML suspension Take by mouth daily as needed for constipation or heartburn       pregabalin (LYRICA) 75 MG capsule Take 1 capsule (75 mg) by mouth 2 times daily 60 capsule 0     tiotropium (SPIRIVA) 18 MCG capsule Inhale 1 capsule (18 mcg) into the lungs every evening 30 capsule 11       ROS:  10 point ROS of systems including Constitutional, Eyes, Respiratory, Cardiovascular, Gastroenterology, Genitourinary, Integumentary, Musculoskeletal, Psychiatric were all negative except for pertinent positives noted in my HPI.    Exam:  /65   Pulse 70   Temp 98.2  F (36.8  C)   Resp 18   Wt 83.7 kg (184 lb 8 oz)   LMP  (LMP Unknown)   SpO2 95%   BMI 30.70 kg/m     GENERAL APPEARANCE:  Alert, in no distress, pleasant, cooperative, oriented x self, place and recent events  EYES:  EOM, lids, pupils and irises normal, sclera clear and conjunctiva normal, no discharge or mattering on lids or lashes noted  ENT:  Mouth normal, moist mucous membranes, nose normal without drainage or crusting, external ears without lesions, hearing acuity intact  NECK: supple, symmetrical  RESP:  respiratory effort and palpation of chest normal, no chest wall tenderness, no respiratory distress, Lung sounds diminished at bases, patient is on room air and sats low at 87%  CV:  Palpation and auscultation of heart done, rate and rhythm controlled and regular, no murmur, no rub or gallop. Edema +1 pitting bilateral lower extremities.   ABDOMEN:  normal bowel sounds, soft, nontender.  M/S:   Gait and station walks with  assist , no tenderness or swelling of the joints; able to move all extremities   SKIN:  Inspection and palpation of skin and subcutaneous tissue left hip incision intact with staples no redness but serous drainage on dressing  NEURO: cranial nerves 2-12 grossly intact, no facial asymmetry, no speech deficits and able to follow directions, moves all extremities symmetrically  PSYCH:  insight and judgement intact, memory intact, affect and mood normal     Lab/Diagnostic data:  CBC RESULTS:   Recent Labs   Lab Test 12/26/18  0620 12/25/18  0612 12/24/18  0746   WBC  --  6.7 7.8   RBC  --  3.14* 3.24*   HGB 8.2* 7.7* 7.9*   HCT  --  27.0* 27.6*   MCV  --  86 85   MCH  --  24.5* 24.4*   MCHC  --  28.5* 28.6*   RDW  --  19.3* 18.0*   PLT  --  227 257       Last Basic Metabolic Panel:  Recent Labs   Lab Test 12/26/18  0620 12/25/18  0612    140   POTASSIUM 3.7 3.8   CHLORIDE 105 102   GERARD 9.1 9.0   CO2 31 33*   BUN 27 31*   CR 1.70* 1.87*   GLC 99 110*       Liver Function Studies -   Recent Labs   Lab Test 12/25/18  0612 12/24/18  0746 12/19/18 2053 12/19/18  1133   PROTTOTAL  --   --  6.5* 7.1   ALBUMIN 2.4* 2.5* 2.8* 3.1*   BILITOTAL  --   --  0.6 0.6   ALKPHOS  --   --  82 74   AST  --   --  6 6   ALT  --   --  10 9       TSH   Date Value Ref Range Status   12/19/2018 3.62 0.40 - 4.00 mU/L Final   04/25/2017 1.56 0.40 - 4.00 mU/L Final       Lab Results   Component Value Date    A1C 5.5 10/19/2018    A1C 6.9 07/21/2018       ASSESSMENT/PLAN:  Acute and chronic respiratory failure with hypoxia (H)  Acute on chronic diastolic CHF (congestive heart failure) (H)  Essential hypertension  Acute on chronic. Meds, vs, wt as ordered. Labs weekly f/u with status next week. O2 to keep sats above 90% wean as able.     Iron deficiency anemia due to chronic blood loss  Ongoing issue. Hgb weekly monitor    Left hip postoperative wound infection  S/p I and D. Antibiotics, ortho f/u as ordered. Monitor labs.      Bacteriuria  Vulvovaginal candidiasis  Improving symptoms. Meds as above. Monitor.     Type 2 diabetes mellitus with diabetic nephropathy, without long-term current use of insulin (H)  CKD (chronic kidney disease) stage 3, GFR 30-59 ml/min (H)  Chronic, stable. Meds as ordered. BG BID. BMP weekly. Avoid nephrotoxic meds.     Chronic obstructive pulmonary disease, unspecified COPD type (H)  Chronic, meds and o2 as ordered. Monitor.     Hyperlipidemia, unspecified hyperlipidemia type  Chronic, statin as PTA.     Physical deconditioning  Acute on chronic. Therapies, f/u with progress next week.     Orders:  1. Change Tylenol to 1000 mg PO TID and daily PRN pain  2. BG check BID at alternating times diagnosis DM  3. Hold Prolia while at U  4. DuoNebs 1 neb QID x 7 days diagnosis resp failure  5. BMP, CBC weekly on Mondays diagnosis HF, anemia    Total time spent with patient visit at the skilled nursing facility was 35 min including patient visit and review of past records. Greater than 50% of total time spent with counseling and coordinating care due to review of history, current status and concerns and POC to address them as noted above    Electronically signed by:  ALLEN Jeronimo CNP                    Sincerely,        ALLEN Jeronimo CNP

## 2018-12-27 NOTE — TELEPHONE ENCOUNTER
Furosemide     Last Written Prescription Date:  -  Last Fill Quantity: -,   # refills: -  Last Office Visit: 12/19/2018 ([facility])  Future Office visit:       Routing refill request to provider for review/approval because:  Drug not active on patient's medication list      glipiZIDE (GLUCOTROL) 5 MG tablet      Last Written Prescription Date:  -  Last Fill Quantity: -,   # refills: -  Last Office Visit: 12/19/2018 ([facility])  Future Office visit:       Routing refill request to provider for review/approval because:  Medication is reported/historical      Requested Prescriptions   Pending Prescriptions Disp Refills     glipiZIDE (GLUCOTROL) 5 MG tablet 90 tablet 3     Sig: Take 1 tablet (5 mg) by mouth every morning (before breakfast)    Sulfonylurea Agents Failed - 12/27/2018  1:27 PM       Failed - Patient has a recent creatinine (normal) within the past 12 mos.    Recent Labs   Lab Test 12/26/18  0620  09/26/16  1309   CR 1.70*   < >  --    CREAT  --   --  1.2*    < > = values in this interval not displayed.            Passed - Blood pressure less than 140/90 in past 6 months    BP Readings from Last 3 Encounters:   12/26/18 132/65   12/26/18 119/48   12/19/18 104/56                Passed - Patient has documented LDL within the past 12 mos.    Recent Labs   Lab Test 01/18/18  1129   LDL 40            Passed - Patient has had a Microalbumin in the past 12 mos.    Recent Labs   Lab Test 01/25/18  1346   MICROL 52   UMALCR 135.40*            Passed - Patient has documented A1c within the specified period of time.    If HgbA1C is 8 or greater, it needs to be on file within the past 3 months.  If less than 8, must be on file within the past 6 months.     Recent Labs   Lab Test 10/19/18  2156   A1C 5.5            Passed - Patient is age 18 or older       Passed - No active pregnancy on record       Passed - Patient has not had a positive pregnancy test within the past 12 mos.       Passed - Recent (6 mo) or future (30 days)  "visit within the authorizing provider's specialty    Patient had office visit in the last 6 months or has a visit in the next 30 days with authorizing provider or within the authorizing provider's specialty.  See \"Patient Info\" tab in inbasket, or \"Choose Columns\" in Meds & Orders section of the refill encounter.              "

## 2018-12-28 NOTE — TELEPHONE ENCOUNTER
"FUROSEMIDE 20 MG TABLET   Last Written Prescription Date:  NA  Last Fill Quantity: NA,   # refills: NA  Last Office Visit: 12/19/2018  Future Office visit:   [facility]    Routing refill request to provider for review/approval because:  Drug not active on patient's medication list  Medication was discontinued. Pt taking torsemide.       Requested Prescriptions   Pending Prescriptions Disp Refills     furosemide (LASIX) 20 MG tablet [Pharmacy Med Name: FUROSEMIDE 20 MG TABLET] 180 tablet 1     Sig: TAKE 1 TO 2 TABS BY MOUTH ONCE DAILY. MAY REPEAT AFTER NOON IF NEEDED FOR WEIGHT GAIN/2+ EDEMA    Diuretics (Including Combos) Protocol Failed - 12/28/2018  1:13 AM       Failed - Normal serum creatinine on file in past 12 months    Recent Labs   Lab Test 12/26/18  0620   CR 1.70*             Passed - Blood pressure under 140/90 in past 12 months    BP Readings from Last 3 Encounters:   12/26/18 132/65   12/26/18 119/48   12/19/18 104/56                Passed - Recent (12 mo) or future (30 days) visit within the authorizing provider's specialty    Patient had office visit in the last 12 months or has a visit in the next 30 days with authorizing provider or within the authorizing provider's specialty.  See \"Patient Info\" tab in inbasket, or \"Choose Columns\" in Meds & Orders section of the refill encounter.             Passed - Patient is age 18 or older       Passed - No active pregancy on record       Passed - Normal serum potassium on file in past 12 months    Recent Labs   Lab Test 12/26/18  0620   POTASSIUM 3.7                   Passed - Normal serum sodium on file in past 12 months    Recent Labs   Lab Test 12/26/18  0620                Passed - No positive pregnancy test in past 12 months            "

## 2019-01-01 ENCOUNTER — TELEPHONE (OUTPATIENT)
Dept: GERIATRICS | Facility: CLINIC | Age: 84
End: 2019-01-01

## 2019-01-01 ENCOUNTER — DOCUMENTATION ONLY (OUTPATIENT)
Dept: CARDIOLOGY | Facility: CLINIC | Age: 84
End: 2019-01-01

## 2019-01-01 ENCOUNTER — APPOINTMENT (OUTPATIENT)
Dept: ULTRASOUND IMAGING | Facility: CLINIC | Age: 84
DRG: 291 | End: 2019-01-01
Attending: HOSPITALIST
Payer: MEDICARE

## 2019-01-01 ENCOUNTER — PATIENT OUTREACH (OUTPATIENT)
Dept: CARE COORDINATION | Facility: CLINIC | Age: 84
End: 2019-01-01

## 2019-01-01 ENCOUNTER — DOCUMENTATION ONLY (OUTPATIENT)
Dept: CARE COORDINATION | Facility: CLINIC | Age: 84
End: 2019-01-01

## 2019-01-01 ENCOUNTER — APPOINTMENT (OUTPATIENT)
Dept: CARDIOLOGY | Facility: CLINIC | Age: 84
DRG: 194 | End: 2019-01-01
Attending: HOSPITALIST
Payer: MEDICARE

## 2019-01-01 ENCOUNTER — OFFICE VISIT (OUTPATIENT)
Dept: CARDIOLOGY | Facility: CLINIC | Age: 84
End: 2019-01-01
Attending: INTERNAL MEDICINE
Payer: MEDICARE

## 2019-01-01 ENCOUNTER — TRANSFERRED RECORDS (OUTPATIENT)
Dept: HEALTH INFORMATION MANAGEMENT | Facility: CLINIC | Age: 84
End: 2019-01-01

## 2019-01-01 ENCOUNTER — APPOINTMENT (OUTPATIENT)
Dept: PHYSICAL THERAPY | Facility: CLINIC | Age: 84
DRG: 291 | End: 2019-01-01
Payer: MEDICARE

## 2019-01-01 ENCOUNTER — TELEPHONE (OUTPATIENT)
Dept: FAMILY MEDICINE | Facility: CLINIC | Age: 84
End: 2019-01-01

## 2019-01-01 ENCOUNTER — APPOINTMENT (OUTPATIENT)
Dept: CT IMAGING | Facility: CLINIC | Age: 84
DRG: 291 | End: 2019-01-01
Attending: INTERNAL MEDICINE
Payer: MEDICARE

## 2019-01-01 ENCOUNTER — OFFICE VISIT (OUTPATIENT)
Dept: FAMILY MEDICINE | Facility: CLINIC | Age: 84
End: 2019-01-01
Payer: MEDICARE

## 2019-01-01 ENCOUNTER — MEDICAL CORRESPONDENCE (OUTPATIENT)
Dept: HEALTH INFORMATION MANAGEMENT | Facility: CLINIC | Age: 84
End: 2019-01-01

## 2019-01-01 ENCOUNTER — ANESTHESIA (OUTPATIENT)
Dept: CARDIOLOGY | Facility: CLINIC | Age: 84
End: 2019-01-01

## 2019-01-01 ENCOUNTER — HOSPITAL ENCOUNTER (OUTPATIENT)
Facility: CLINIC | Age: 84
End: 2019-01-01
Admitting: INTERNAL MEDICINE
Payer: MEDICARE

## 2019-01-01 ENCOUNTER — DOCUMENTATION ONLY (OUTPATIENT)
Dept: OTHER | Facility: CLINIC | Age: 84
End: 2019-01-01

## 2019-01-01 ENCOUNTER — HOSPITAL ENCOUNTER (INPATIENT)
Age: 84
Setting detail: SURGERY ADMIT
End: 2019-01-01
Payer: MEDICARE

## 2019-01-01 ENCOUNTER — APPOINTMENT (OUTPATIENT)
Dept: CARDIOLOGY | Facility: CLINIC | Age: 84
DRG: 291 | End: 2019-01-01
Attending: PHYSICIAN ASSISTANT
Payer: MEDICARE

## 2019-01-01 ENCOUNTER — APPOINTMENT (OUTPATIENT)
Dept: PHYSICAL THERAPY | Facility: CLINIC | Age: 84
DRG: 194 | End: 2019-01-01
Payer: MEDICARE

## 2019-01-01 ENCOUNTER — HOSPITAL ENCOUNTER (INPATIENT)
Facility: CLINIC | Age: 84
LOS: 5 days | Discharge: HOME HEALTH | DRG: 393 | End: 2019-02-21
Attending: EMERGENCY MEDICINE | Admitting: INTERNAL MEDICINE
Payer: MEDICARE

## 2019-01-01 ENCOUNTER — NURSING HOME VISIT (OUTPATIENT)
Dept: GERIATRICS | Facility: CLINIC | Age: 84
End: 2019-01-01
Payer: MEDICARE

## 2019-01-01 ENCOUNTER — APPOINTMENT (OUTPATIENT)
Dept: OCCUPATIONAL THERAPY | Facility: CLINIC | Age: 84
DRG: 194 | End: 2019-01-01
Attending: HOSPITALIST
Payer: MEDICARE

## 2019-01-01 ENCOUNTER — APPOINTMENT (OUTPATIENT)
Dept: OCCUPATIONAL THERAPY | Facility: CLINIC | Age: 84
DRG: 291 | End: 2019-01-01
Payer: MEDICARE

## 2019-01-01 ENCOUNTER — APPOINTMENT (OUTPATIENT)
Dept: PHYSICAL THERAPY | Facility: CLINIC | Age: 84
DRG: 291 | End: 2019-01-01
Attending: HOSPITALIST
Payer: MEDICARE

## 2019-01-01 ENCOUNTER — NURSE TRIAGE (OUTPATIENT)
Dept: NURSING | Facility: CLINIC | Age: 84
End: 2019-01-01

## 2019-01-01 ENCOUNTER — DOCUMENTATION ONLY (OUTPATIENT)
Dept: MEDSURG UNIT | Facility: CLINIC | Age: 84
End: 2019-01-01

## 2019-01-01 ENCOUNTER — CARE COORDINATION (OUTPATIENT)
Dept: CARDIOLOGY | Facility: CLINIC | Age: 84
End: 2019-01-01

## 2019-01-01 ENCOUNTER — DISCHARGE SUMMARY NURSING HOME (OUTPATIENT)
Dept: GERIATRICS | Facility: CLINIC | Age: 84
End: 2019-01-01
Payer: MEDICARE

## 2019-01-01 ENCOUNTER — TELEPHONE (OUTPATIENT)
Dept: CARDIOLOGY | Facility: CLINIC | Age: 84
End: 2019-01-01

## 2019-01-01 ENCOUNTER — APPOINTMENT (OUTPATIENT)
Dept: GENERAL RADIOLOGY | Facility: CLINIC | Age: 84
DRG: 393 | End: 2019-01-01
Attending: INTERNAL MEDICINE
Payer: MEDICARE

## 2019-01-01 ENCOUNTER — HOSPITAL ENCOUNTER (OUTPATIENT)
Dept: CARDIOLOGY | Facility: CLINIC | Age: 84
End: 2019-05-07
Attending: INTERNAL MEDICINE
Payer: MEDICARE

## 2019-01-01 ENCOUNTER — ANESTHESIA EVENT (OUTPATIENT)
Dept: GASTROENTEROLOGY | Facility: CLINIC | Age: 84
DRG: 393 | End: 2019-01-01
Payer: MEDICARE

## 2019-01-01 ENCOUNTER — VIRTUAL VISIT (OUTPATIENT)
Dept: FAMILY MEDICINE | Facility: CLINIC | Age: 84
End: 2019-01-01
Payer: MEDICARE

## 2019-01-01 ENCOUNTER — APPOINTMENT (OUTPATIENT)
Dept: GENERAL RADIOLOGY | Facility: CLINIC | Age: 84
End: 2019-01-01
Attending: EMERGENCY MEDICINE
Payer: MEDICARE

## 2019-01-01 ENCOUNTER — APPOINTMENT (OUTPATIENT)
Dept: PHYSICAL THERAPY | Facility: CLINIC | Age: 84
DRG: 393 | End: 2019-01-01
Attending: INTERNAL MEDICINE
Payer: MEDICARE

## 2019-01-01 ENCOUNTER — ANESTHESIA EVENT (OUTPATIENT)
Dept: CARDIOLOGY | Facility: CLINIC | Age: 84
End: 2019-01-01

## 2019-01-01 ENCOUNTER — APPOINTMENT (OUTPATIENT)
Dept: PHYSICAL THERAPY | Facility: CLINIC | Age: 84
DRG: 194 | End: 2019-01-01
Attending: HOSPITALIST
Payer: MEDICARE

## 2019-01-01 ENCOUNTER — HOSPITAL ENCOUNTER (INPATIENT)
Facility: CLINIC | Age: 84
LOS: 7 days | Discharge: NURSING, REHAB OR LONG-TERM CARE | DRG: 291 | End: 2019-05-10
Attending: EMERGENCY MEDICINE | Admitting: HOSPITALIST
Payer: MEDICARE

## 2019-01-01 ENCOUNTER — HOSPITAL ENCOUNTER (EMERGENCY)
Facility: CLINIC | Age: 84
Discharge: HOME | End: 2019-02-12
Attending: EMERGENCY MEDICINE | Admitting: EMERGENCY MEDICINE
Payer: MEDICARE

## 2019-01-01 ENCOUNTER — APPOINTMENT (OUTPATIENT)
Dept: GENERAL RADIOLOGY | Facility: CLINIC | Age: 84
DRG: 291 | End: 2019-01-01
Attending: INTERNAL MEDICINE
Payer: MEDICARE

## 2019-01-01 ENCOUNTER — ANCILLARY PROCEDURE (OUTPATIENT)
Dept: GENERAL RADIOLOGY | Facility: CLINIC | Age: 84
End: 2019-01-01
Attending: NURSE PRACTITIONER
Payer: MEDICARE

## 2019-01-01 ENCOUNTER — HOSPITAL ENCOUNTER (INPATIENT)
Facility: CLINIC | Age: 84
LOS: 6 days | DRG: 194 | End: 2019-06-27
Attending: EMERGENCY MEDICINE | Admitting: HOSPITALIST
Payer: MEDICARE

## 2019-01-01 ENCOUNTER — HOSPITAL ENCOUNTER (OUTPATIENT)
Facility: CLINIC | Age: 84
End: 2019-01-01
Payer: MEDICARE

## 2019-01-01 ENCOUNTER — OFFICE VISIT (OUTPATIENT)
Dept: CARDIOLOGY | Facility: CLINIC | Age: 84
End: 2019-01-01
Payer: MEDICARE

## 2019-01-01 ENCOUNTER — ALLIED HEALTH/NURSE VISIT (OUTPATIENT)
Dept: PHARMACY | Facility: CLINIC | Age: 84
End: 2019-01-01
Payer: COMMERCIAL

## 2019-01-01 ENCOUNTER — APPOINTMENT (OUTPATIENT)
Dept: CT IMAGING | Facility: CLINIC | Age: 84
DRG: 194 | End: 2019-01-01
Attending: HOSPITALIST
Payer: MEDICARE

## 2019-01-01 ENCOUNTER — APPOINTMENT (OUTPATIENT)
Dept: CARDIOLOGY | Facility: CLINIC | Age: 84
DRG: 393 | End: 2019-01-01
Attending: INTERNAL MEDICINE
Payer: MEDICARE

## 2019-01-01 ENCOUNTER — APPOINTMENT (OUTPATIENT)
Dept: OCCUPATIONAL THERAPY | Facility: CLINIC | Age: 84
DRG: 291 | End: 2019-01-01
Attending: INTERNAL MEDICINE
Payer: MEDICARE

## 2019-01-01 ENCOUNTER — APPOINTMENT (OUTPATIENT)
Dept: GENERAL RADIOLOGY | Facility: CLINIC | Age: 84
DRG: 194 | End: 2019-01-01
Attending: EMERGENCY MEDICINE
Payer: MEDICARE

## 2019-01-01 ENCOUNTER — ANESTHESIA (OUTPATIENT)
Dept: GASTROENTEROLOGY | Facility: CLINIC | Age: 84
DRG: 393 | End: 2019-01-01
Payer: MEDICARE

## 2019-01-01 ENCOUNTER — PATIENT OUTREACH (OUTPATIENT)
Dept: FAMILY MEDICINE | Facility: CLINIC | Age: 84
End: 2019-01-01

## 2019-01-01 ENCOUNTER — APPOINTMENT (OUTPATIENT)
Dept: GENERAL RADIOLOGY | Facility: CLINIC | Age: 84
DRG: 291 | End: 2019-01-01
Attending: EMERGENCY MEDICINE
Payer: MEDICARE

## 2019-01-01 VITALS
HEART RATE: 69 BPM | SYSTOLIC BLOOD PRESSURE: 104 MMHG | OXYGEN SATURATION: 93 % | BODY MASS INDEX: 28.61 KG/M2 | TEMPERATURE: 97.3 F | DIASTOLIC BLOOD PRESSURE: 64 MMHG | WEIGHT: 171.9 LBS | RESPIRATION RATE: 14 BRPM

## 2019-01-01 VITALS
WEIGHT: 173.2 LBS | HEIGHT: 65 IN | BODY MASS INDEX: 28.86 KG/M2 | HEART RATE: 68 BPM | SYSTOLIC BLOOD PRESSURE: 123 MMHG | DIASTOLIC BLOOD PRESSURE: 75 MMHG | OXYGEN SATURATION: 94 %

## 2019-01-01 VITALS
BODY MASS INDEX: 27.99 KG/M2 | TEMPERATURE: 98.3 F | WEIGHT: 168 LBS | OXYGEN SATURATION: 92 % | DIASTOLIC BLOOD PRESSURE: 52 MMHG | SYSTOLIC BLOOD PRESSURE: 107 MMHG | HEIGHT: 65 IN | RESPIRATION RATE: 24 BRPM | HEART RATE: 70 BPM

## 2019-01-01 VITALS
SYSTOLIC BLOOD PRESSURE: 108 MMHG | DIASTOLIC BLOOD PRESSURE: 66 MMHG | BODY MASS INDEX: 26.84 KG/M2 | TEMPERATURE: 98 F | OXYGEN SATURATION: 92 % | HEART RATE: 76 BPM | RESPIRATION RATE: 18 BRPM | WEIGHT: 161.3 LBS

## 2019-01-01 VITALS
DIASTOLIC BLOOD PRESSURE: 70 MMHG | WEIGHT: 158 LBS | SYSTOLIC BLOOD PRESSURE: 114 MMHG | HEART RATE: 72 BPM | HEIGHT: 65 IN | OXYGEN SATURATION: 94 % | TEMPERATURE: 97.5 F | BODY MASS INDEX: 26.33 KG/M2

## 2019-01-01 VITALS
BODY MASS INDEX: 27.79 KG/M2 | SYSTOLIC BLOOD PRESSURE: 96 MMHG | TEMPERATURE: 97.7 F | WEIGHT: 167 LBS | OXYGEN SATURATION: 97 % | HEART RATE: 70 BPM | DIASTOLIC BLOOD PRESSURE: 63 MMHG

## 2019-01-01 VITALS
WEIGHT: 151.6 LBS | SYSTOLIC BLOOD PRESSURE: 128 MMHG | TEMPERATURE: 97.3 F | OXYGEN SATURATION: 91 % | WEIGHT: 156 LBS | HEART RATE: 70 BPM | DIASTOLIC BLOOD PRESSURE: 79 MMHG | DIASTOLIC BLOOD PRESSURE: 72 MMHG | HEIGHT: 65 IN | HEIGHT: 65 IN | SYSTOLIC BLOOD PRESSURE: 135 MMHG | BODY MASS INDEX: 25.26 KG/M2 | HEART RATE: 70 BPM | BODY MASS INDEX: 25.99 KG/M2

## 2019-01-01 VITALS
HEIGHT: 65 IN | OXYGEN SATURATION: 95 % | DIASTOLIC BLOOD PRESSURE: 63 MMHG | SYSTOLIC BLOOD PRESSURE: 115 MMHG | RESPIRATION RATE: 16 BRPM | HEART RATE: 70 BPM | BODY MASS INDEX: 27.39 KG/M2 | TEMPERATURE: 98.6 F | WEIGHT: 164.4 LBS

## 2019-01-01 VITALS
TEMPERATURE: 97.8 F | HEART RATE: 71 BPM | RESPIRATION RATE: 18 BRPM | BODY MASS INDEX: 28.61 KG/M2 | OXYGEN SATURATION: 96 % | HEIGHT: 65 IN | SYSTOLIC BLOOD PRESSURE: 127 MMHG | DIASTOLIC BLOOD PRESSURE: 69 MMHG

## 2019-01-01 VITALS
RESPIRATION RATE: 16 BRPM | WEIGHT: 172 LBS | TEMPERATURE: 97.5 F | OXYGEN SATURATION: 92 % | HEART RATE: 70 BPM | SYSTOLIC BLOOD PRESSURE: 117 MMHG | DIASTOLIC BLOOD PRESSURE: 71 MMHG | BODY MASS INDEX: 28.62 KG/M2

## 2019-01-01 VITALS
DIASTOLIC BLOOD PRESSURE: 79 MMHG | WEIGHT: 169.8 LBS | HEIGHT: 65 IN | OXYGEN SATURATION: 94 % | SYSTOLIC BLOOD PRESSURE: 132 MMHG | HEART RATE: 72 BPM | BODY MASS INDEX: 28.29 KG/M2

## 2019-01-01 VITALS
DIASTOLIC BLOOD PRESSURE: 93 MMHG | BODY MASS INDEX: 26.99 KG/M2 | SYSTOLIC BLOOD PRESSURE: 138 MMHG | WEIGHT: 162 LBS | HEART RATE: 70 BPM | HEIGHT: 65 IN

## 2019-01-01 VITALS
SYSTOLIC BLOOD PRESSURE: 113 MMHG | OXYGEN SATURATION: 87 % | RESPIRATION RATE: 14 BRPM | HEART RATE: 74 BPM | DIASTOLIC BLOOD PRESSURE: 63 MMHG

## 2019-01-01 VITALS
DIASTOLIC BLOOD PRESSURE: 76 MMHG | HEART RATE: 70 BPM | WEIGHT: 163 LBS | OXYGEN SATURATION: 93 % | HEIGHT: 65 IN | BODY MASS INDEX: 27.16 KG/M2 | SYSTOLIC BLOOD PRESSURE: 136 MMHG

## 2019-01-01 VITALS
DIASTOLIC BLOOD PRESSURE: 56 MMHG | OXYGEN SATURATION: 90 % | TEMPERATURE: 97.2 F | WEIGHT: 170.4 LBS | BODY MASS INDEX: 29.09 KG/M2 | SYSTOLIC BLOOD PRESSURE: 102 MMHG | HEART RATE: 73 BPM | RESPIRATION RATE: 16 BRPM | HEIGHT: 64 IN

## 2019-01-01 VITALS
BODY MASS INDEX: 28.64 KG/M2 | OXYGEN SATURATION: 94 % | WEIGHT: 172.1 LBS | TEMPERATURE: 97.7 F | RESPIRATION RATE: 16 BRPM | SYSTOLIC BLOOD PRESSURE: 105 MMHG | HEART RATE: 70 BPM | DIASTOLIC BLOOD PRESSURE: 68 MMHG

## 2019-01-01 VITALS
BODY MASS INDEX: 26.96 KG/M2 | OXYGEN SATURATION: 98 % | HEART RATE: 64 BPM | WEIGHT: 162 LBS | SYSTOLIC BLOOD PRESSURE: 117 MMHG | TEMPERATURE: 98 F | DIASTOLIC BLOOD PRESSURE: 68 MMHG

## 2019-01-01 VITALS — TEMPERATURE: 96.7 F | OXYGEN SATURATION: 96 % | RESPIRATION RATE: 14 BRPM | HEART RATE: 68 BPM

## 2019-01-01 DIAGNOSIS — Z87.81 S/P LEFT HIP FRACTURE: ICD-10-CM

## 2019-01-01 DIAGNOSIS — G89.29 CHRONIC LOW BACK PAIN WITH SCIATICA, SCIATICA LATERALITY UNSPECIFIED, UNSPECIFIED BACK PAIN LATERALITY: ICD-10-CM

## 2019-01-01 DIAGNOSIS — I50.9 CONGESTIVE HEART FAILURE, UNSPECIFIED HF CHRONICITY, UNSPECIFIED HEART FAILURE TYPE (H): ICD-10-CM

## 2019-01-01 DIAGNOSIS — Z79.899 MEDICATION MANAGEMENT: ICD-10-CM

## 2019-01-01 DIAGNOSIS — I48.21 PERMANENT ATRIAL FIBRILLATION (H): ICD-10-CM

## 2019-01-01 DIAGNOSIS — J44.9 CHRONIC OBSTRUCTIVE PULMONARY DISEASE, UNSPECIFIED COPD TYPE (H): ICD-10-CM

## 2019-01-01 DIAGNOSIS — E11.21 TYPE 2 DIABETES MELLITUS WITH DIABETIC NEPHROPATHY, WITHOUT LONG-TERM CURRENT USE OF INSULIN (H): ICD-10-CM

## 2019-01-01 DIAGNOSIS — I10 ESSENTIAL HYPERTENSION: ICD-10-CM

## 2019-01-01 DIAGNOSIS — I50.30 (HFPEF) HEART FAILURE WITH PRESERVED EJECTION FRACTION (H): ICD-10-CM

## 2019-01-01 DIAGNOSIS — N18.4 CKD (CHRONIC KIDNEY DISEASE) STAGE 4, GFR 15-29 ML/MIN (H): ICD-10-CM

## 2019-01-01 DIAGNOSIS — I07.1 TRICUSPID VALVE INSUFFICIENCY, UNSPECIFIED ETIOLOGY: Primary | ICD-10-CM

## 2019-01-01 DIAGNOSIS — M81.0 OSTEOPOROSIS, UNSPECIFIED OSTEOPOROSIS TYPE, UNSPECIFIED PATHOLOGICAL FRACTURE PRESENCE: ICD-10-CM

## 2019-01-01 DIAGNOSIS — I27.20 PULMONARY HYPERTENSION (H): ICD-10-CM

## 2019-01-01 DIAGNOSIS — R53.81 PHYSICAL DECONDITIONING: ICD-10-CM

## 2019-01-01 DIAGNOSIS — D64.9 ANEMIA, UNSPECIFIED TYPE: Primary | ICD-10-CM

## 2019-01-01 DIAGNOSIS — N18.4 STAGE 4 CHRONIC KIDNEY DISEASE (H): ICD-10-CM

## 2019-01-01 DIAGNOSIS — D64.9 ANEMIA, UNSPECIFIED TYPE: ICD-10-CM

## 2019-01-01 DIAGNOSIS — J96.01 ACUTE RESPIRATORY FAILURE WITH HYPOXIA (H): ICD-10-CM

## 2019-01-01 DIAGNOSIS — Z71.89 ADVANCED DIRECTIVES, COUNSELING/DISCUSSION: Primary | ICD-10-CM

## 2019-01-01 DIAGNOSIS — I50.812 CHRONIC RIGHT-SIDED HEART FAILURE (H): Primary | ICD-10-CM

## 2019-01-01 DIAGNOSIS — E87.6 HYPOKALEMIA: ICD-10-CM

## 2019-01-01 DIAGNOSIS — I50.812 CHRONIC RIGHT-SIDED HEART FAILURE (H): ICD-10-CM

## 2019-01-01 DIAGNOSIS — J44.9 CHRONIC OBSTRUCTIVE PULMONARY DISEASE, UNSPECIFIED COPD TYPE (H): Primary | ICD-10-CM

## 2019-01-01 DIAGNOSIS — I50.33 ACUTE ON CHRONIC DIASTOLIC CHF (CONGESTIVE HEART FAILURE) (H): Primary | ICD-10-CM

## 2019-01-01 DIAGNOSIS — R82.71 BACTERIURIA: ICD-10-CM

## 2019-01-01 DIAGNOSIS — T81.49XA LEFT HIP POSTOPERATIVE WOUND INFECTION: ICD-10-CM

## 2019-01-01 DIAGNOSIS — G62.9 NEUROPATHY: ICD-10-CM

## 2019-01-01 DIAGNOSIS — R10.13 DYSPEPSIA: ICD-10-CM

## 2019-01-01 DIAGNOSIS — I50.30 (HFPEF) HEART FAILURE WITH PRESERVED EJECTION FRACTION (H): Primary | ICD-10-CM

## 2019-01-01 DIAGNOSIS — J18.9 PNEUMONIA DUE TO INFECTIOUS ORGANISM, UNSPECIFIED LATERALITY, UNSPECIFIED PART OF LUNG: ICD-10-CM

## 2019-01-01 DIAGNOSIS — I21.4 NSTEMI (NON-ST ELEVATED MYOCARDIAL INFARCTION) (H): ICD-10-CM

## 2019-01-01 DIAGNOSIS — I73.9 PERIPHERAL VASCULAR DISEASE (H): ICD-10-CM

## 2019-01-01 DIAGNOSIS — I48.91 ATRIAL FIBRILLATION, UNSPECIFIED TYPE (H): ICD-10-CM

## 2019-01-01 DIAGNOSIS — R53.1 GENERALIZED WEAKNESS: ICD-10-CM

## 2019-01-01 DIAGNOSIS — Z23 NEED FOR VACCINATION: ICD-10-CM

## 2019-01-01 DIAGNOSIS — I48.21 PERMANENT ATRIAL FIBRILLATION (H): Primary | ICD-10-CM

## 2019-01-01 DIAGNOSIS — I48.20 CHRONIC ATRIAL FIBRILLATION (H): ICD-10-CM

## 2019-01-01 DIAGNOSIS — D64.9 SYMPTOMATIC ANEMIA: ICD-10-CM

## 2019-01-01 DIAGNOSIS — J18.9 HCAP (HEALTHCARE-ASSOCIATED PNEUMONIA): ICD-10-CM

## 2019-01-01 DIAGNOSIS — M54.40 CHRONIC LOW BACK PAIN WITH SCIATICA, SCIATICA LATERALITY UNSPECIFIED, UNSPECIFIED BACK PAIN LATERALITY: ICD-10-CM

## 2019-01-01 DIAGNOSIS — J40 BRONCHITIS: ICD-10-CM

## 2019-01-01 DIAGNOSIS — D50.0 IRON DEFICIENCY ANEMIA DUE TO CHRONIC BLOOD LOSS: ICD-10-CM

## 2019-01-01 DIAGNOSIS — R06.02 SHORTNESS OF BREATH: Primary | ICD-10-CM

## 2019-01-01 DIAGNOSIS — M54.50 ACUTE MIDLINE LOW BACK PAIN WITHOUT SCIATICA: ICD-10-CM

## 2019-01-01 DIAGNOSIS — M81.0 AGE-RELATED OSTEOPOROSIS WITHOUT CURRENT PATHOLOGICAL FRACTURE: ICD-10-CM

## 2019-01-01 DIAGNOSIS — L29.9 ITCHING: ICD-10-CM

## 2019-01-01 DIAGNOSIS — M54.2 NECK PAIN: ICD-10-CM

## 2019-01-01 DIAGNOSIS — K21.9 GASTROESOPHAGEAL REFLUX DISEASE WITHOUT ESOPHAGITIS: ICD-10-CM

## 2019-01-01 DIAGNOSIS — Z98.890 S/P AV NODAL ABLATION: ICD-10-CM

## 2019-01-01 DIAGNOSIS — E11.21 TYPE 2 DIABETES MELLITUS WITH DIABETIC NEPHROPATHY, WITHOUT LONG-TERM CURRENT USE OF INSULIN (H): Primary | ICD-10-CM

## 2019-01-01 DIAGNOSIS — N18.30 CKD (CHRONIC KIDNEY DISEASE) STAGE 3, GFR 30-59 ML/MIN (H): ICD-10-CM

## 2019-01-01 DIAGNOSIS — E78.5 HYPERLIPIDEMIA, UNSPECIFIED HYPERLIPIDEMIA TYPE: ICD-10-CM

## 2019-01-01 DIAGNOSIS — I50.23 ACUTE ON CHRONIC SYSTOLIC CONGESTIVE HEART FAILURE (H): ICD-10-CM

## 2019-01-01 DIAGNOSIS — R50.9 FEVER, UNSPECIFIED FEVER CAUSE: ICD-10-CM
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
## 2019-01-01 DIAGNOSIS — R60.0 PERIPHERAL EDEMA: ICD-10-CM

## 2019-01-01 DIAGNOSIS — I50.33 ACUTE ON CHRONIC DIASTOLIC CHF (CONGESTIVE HEART FAILURE) (H): ICD-10-CM

## 2019-01-01 DIAGNOSIS — D64.9 SEVERE ANEMIA: ICD-10-CM

## 2019-01-01 DIAGNOSIS — J31.0 RHINITIS, UNSPECIFIED TYPE: Primary | ICD-10-CM

## 2019-01-01 DIAGNOSIS — R53.81 GENERAL DETERIORATION OF HEALTH: Primary | ICD-10-CM

## 2019-01-01 DIAGNOSIS — K92.2 CHRONIC LOWER GI BLEEDING: ICD-10-CM

## 2019-01-01 DIAGNOSIS — H01.005 BLEPHARITIS OF LEFT LOWER EYELID, UNSPECIFIED TYPE: ICD-10-CM

## 2019-01-01 DIAGNOSIS — R21 RASH: ICD-10-CM

## 2019-01-01 DIAGNOSIS — R06.02 SHORTNESS OF BREATH: ICD-10-CM

## 2019-01-01 DIAGNOSIS — M62.81 GENERALIZED MUSCLE WEAKNESS: ICD-10-CM

## 2019-01-01 DIAGNOSIS — Z95.0 CARDIAC PACEMAKER IN SITU: Primary | ICD-10-CM

## 2019-01-01 DIAGNOSIS — E11.21 TYPE 2 DIABETES MELLITUS WITH DIABETIC NEPHROPATHY (H): ICD-10-CM

## 2019-01-01 DIAGNOSIS — Q27.8: ICD-10-CM

## 2019-01-01 DIAGNOSIS — Z79.01 ANTICOAGULATED: ICD-10-CM

## 2019-01-01 DIAGNOSIS — I10 BENIGN ESSENTIAL HYPERTENSION: ICD-10-CM

## 2019-01-01 DIAGNOSIS — J18.9 PNEUMONIA OF RIGHT LUNG DUE TO INFECTIOUS ORGANISM, UNSPECIFIED PART OF LUNG: ICD-10-CM

## 2019-01-01 DIAGNOSIS — I50.9 CONGESTIVE HEART FAILURE, UNSPECIFIED HF CHRONICITY, UNSPECIFIED HEART FAILURE TYPE (H): Primary | ICD-10-CM

## 2019-01-01 DIAGNOSIS — J96.21 ACUTE AND CHRONIC RESPIRATORY FAILURE WITH HYPOXIA (H): Primary | ICD-10-CM

## 2019-01-01 DIAGNOSIS — D50.8 OTHER IRON DEFICIENCY ANEMIA: ICD-10-CM

## 2019-01-01 DIAGNOSIS — K64.4 EXTERNAL HEMORRHOIDS: ICD-10-CM

## 2019-01-01 LAB
ABO + RH BLD: NORMAL
ALBUMIN SERPL-MCNC: 2.9 G/DL (ref 3.4–5)
ALBUMIN SERPL-MCNC: 2.9 G/DL (ref 3.4–5)
ALBUMIN SERPL-MCNC: 3.1 G/DL (ref 3.4–5)
ALBUMIN SERPL-MCNC: 3.2 G/DL (ref 3.4–5)
ALBUMIN SERPL-MCNC: 3.3 G/DL (ref 3.4–5)
ALBUMIN SERPL-MCNC: NORMAL G/DL (ref 3.4–5)
ALBUMIN UR-MCNC: NEGATIVE MG/DL
ALP SERPL-CCNC: 58 U/L (ref 40–150)
ALP SERPL-CCNC: 64 U/L (ref 40–150)
ALP SERPL-CCNC: 64 U/L (ref 40–150)
ALP SERPL-CCNC: 73 U/L (ref 40–150)
ALP SERPL-CCNC: 80 U/L (ref 40–150)
ALP SERPL-CCNC: NORMAL U/L (ref 40–150)
ALT SERPL W P-5'-P-CCNC: 10 U/L (ref 0–50)
ALT SERPL W P-5'-P-CCNC: 12 U/L (ref 0–50)
ALT SERPL W P-5'-P-CCNC: 13 U/L (ref 0–50)
ALT SERPL W P-5'-P-CCNC: 14 U/L (ref 0–50)
ALT SERPL W P-5'-P-CCNC: 14 U/L (ref 0–50)
ALT SERPL W P-5'-P-CCNC: NORMAL U/L (ref 0–50)
AMPHETAMINES UR QL: NOT DETECTED NG/ML
ANION GAP SERPL CALCULATED.3IONS-SCNC: 1 MMOL/L (ref 3–14)
ANION GAP SERPL CALCULATED.3IONS-SCNC: 10.4 MMOL/L (ref 6–17)
ANION GAP SERPL CALCULATED.3IONS-SCNC: 11 MMOL/L (ref 7–16)
ANION GAP SERPL CALCULATED.3IONS-SCNC: 12 MMOL/L (ref 3–14)
ANION GAP SERPL CALCULATED.3IONS-SCNC: 2 MMOL/L (ref 3–14)
ANION GAP SERPL CALCULATED.3IONS-SCNC: 3 MMOL/L (ref 3–14)
ANION GAP SERPL CALCULATED.3IONS-SCNC: 4 MMOL/L (ref 3–14)
ANION GAP SERPL CALCULATED.3IONS-SCNC: 5 MMOL/L (ref 3–14)
ANION GAP SERPL CALCULATED.3IONS-SCNC: 6 MMOL/L (ref 3–14)
ANION GAP SERPL CALCULATED.3IONS-SCNC: 7 MMOL/L (ref 3–14)
ANION GAP SERPL CALCULATED.3IONS-SCNC: 7 MMOL/L (ref 3–14)
ANION GAP SERPL CALCULATED.3IONS-SCNC: 8 MMOL/L (ref 3–14)
ANION GAP SERPL CALCULATED.3IONS-SCNC: 8 MMOL/L (ref 3–14)
ANION GAP SERPL CALCULATED.3IONS-SCNC: 9 MMOL/L (ref 3–14)
ANION GAP SERPL CALCULATED.3IONS-SCNC: <1 MMOL/L (ref 3–14)
ANION GAP SERPL CALCULATED.3IONS-SCNC: NORMAL MMOL/L (ref 6–17)
APPEARANCE UR: CLEAR
APTT PPP: 27 SEC (ref 22–37)
AST SERPL W P-5'-P-CCNC: 10 U/L (ref 0–45)
AST SERPL W P-5'-P-CCNC: 10 U/L (ref 0–45)
AST SERPL W P-5'-P-CCNC: 13 U/L (ref 0–45)
AST SERPL W P-5'-P-CCNC: 18 U/L (ref 0–45)
AST SERPL W P-5'-P-CCNC: 9 U/L (ref 0–45)
AST SERPL W P-5'-P-CCNC: NORMAL U/L (ref 0–45)
BACTERIA #/AREA URNS HPF: ABNORMAL /HPF
BACTERIA SPEC CULT: NO GROWTH
BACTERIA SPEC CULT: NO GROWTH
BARBITURATES UR QL SCN: NOT DETECTED NG/ML
BASOPHILS # BLD AUTO: 0 10E9/L (ref 0–0.2)
BASOPHILS NFR BLD AUTO: 0.1 %
BASOPHILS NFR BLD AUTO: 0.2 %
BASOPHILS NFR BLD AUTO: 0.2 %
BASOPHILS NFR BLD AUTO: 0.4 %
BASOPHILS NFR BLD AUTO: 0.5 %
BASOPHILS NFR BLD AUTO: 0.6 %
BASOPHILS NFR BLD AUTO: 0.8 %
BENZODIAZ UR QL SCN: NOT DETECTED NG/ML
BILIRUB SERPL-MCNC: 0.4 MG/DL (ref 0.2–1.3)
BILIRUB SERPL-MCNC: 0.6 MG/DL (ref 0.2–1.3)
BILIRUB SERPL-MCNC: 0.9 MG/DL (ref 0.2–1.3)
BILIRUB SERPL-MCNC: 1.1 MG/DL (ref 0.2–1.3)
BILIRUB SERPL-MCNC: 1.5 MG/DL (ref 0.2–1.3)
BILIRUB SERPL-MCNC: NORMAL MG/DL (ref 0.2–1.3)
BILIRUB UR QL STRIP: NEGATIVE
BLD GP AB SCN SERPL QL: NORMAL
BLD PROD TYP BPU: NORMAL
BLD UNIT ID BPU: 0
BLD UNIT ID BPU: 0
BLOOD BANK CMNT PATIENT-IMP: NORMAL
BLOOD PRODUCT CODE: NORMAL
BLOOD PRODUCT CODE: NORMAL
BPU ID: NORMAL
BPU ID: NORMAL
BUN SERPL-MCNC: 19 MG/DL (ref 7–30)
BUN SERPL-MCNC: 25 MG/DL (ref 7–30)
BUN SERPL-MCNC: 26 MG/DL (ref 7–30)
BUN SERPL-MCNC: 27 MG/DL (ref 7–30)
BUN SERPL-MCNC: 30 MG/DL (ref 7–30)
BUN SERPL-MCNC: 34 MG/DL (ref 7–30)
BUN SERPL-MCNC: 34 MG/DL (ref 7–30)
BUN SERPL-MCNC: 39 MG/DL (ref 9–26)
BUN SERPL-MCNC: 40 MG/DL (ref 7–30)
BUN SERPL-MCNC: 40 MG/DL (ref 7–30)
BUN SERPL-MCNC: 44 MG/DL (ref 7–30)
BUN SERPL-MCNC: 45 MG/DL (ref 7–30)
BUN SERPL-MCNC: 47 MG/DL (ref 7–30)
BUN SERPL-MCNC: 48 MG/DL (ref 7–30)
BUN SERPL-MCNC: 48 MG/DL (ref 7–30)
BUN SERPL-MCNC: 49 MG/DL (ref 7–30)
BUN SERPL-MCNC: 50 MG/DL (ref 7–26)
BUN SERPL-MCNC: 54 MG/DL (ref 7–30)
BUN SERPL-MCNC: 55 MG/DL (ref 7–30)
BUN SERPL-MCNC: 57 MG/DL (ref 7–30)
BUN SERPL-MCNC: 62 MG/DL (ref 7–30)
BUN SERPL-MCNC: 63 MG/DL (ref 7–30)
BUN SERPL-MCNC: 67 MG/DL (ref 7–30)
BUN SERPL-MCNC: 72 MG/DL (ref 7–30)
BUN SERPL-MCNC: NORMAL MG/DL (ref 7–30)
BUPRENORPHINE UR QL: NOT DETECTED NG/ML
CALCIUM SERPL-MCNC: 10.1 MG/DL (ref 8.5–10.1)
CALCIUM SERPL-MCNC: 10.3 MG/DL (ref 8.5–10.1)
CALCIUM SERPL-MCNC: 7.9 MG/DL (ref 8.5–10.1)
CALCIUM SERPL-MCNC: 8.1 MG/DL (ref 8.5–10.1)
CALCIUM SERPL-MCNC: 8.4 MG/DL (ref 8.5–10.1)
CALCIUM SERPL-MCNC: 8.6 MG/DL (ref 8.5–10.1)
CALCIUM SERPL-MCNC: 9 MG/DL (ref 8.5–10.1)
CALCIUM SERPL-MCNC: 9.2 MG/DL (ref 8.5–10.1)
CALCIUM SERPL-MCNC: 9.3 MG/DL (ref 8.5–10.1)
CALCIUM SERPL-MCNC: 9.4 MG/DL (ref 8.4–10.4)
CALCIUM SERPL-MCNC: 9.4 MG/DL (ref 8.5–10.1)
CALCIUM SERPL-MCNC: 9.4 MG/DL (ref 8.5–10.5)
CALCIUM SERPL-MCNC: 9.5 MG/DL (ref 8.4–10.4)
CALCIUM SERPL-MCNC: 9.5 MG/DL (ref 8.5–10.1)
CALCIUM SERPL-MCNC: 9.6 MG/DL (ref 8.5–10.1)
CALCIUM SERPL-MCNC: 9.6 MG/DL (ref 8.5–10.1)
CALCIUM SERPL-MCNC: 9.7 MG/DL (ref 8.5–10.1)
CALCIUM SERPL-MCNC: 9.8 MG/DL (ref 8.5–10.1)
CALCIUM SERPL-MCNC: 9.8 MG/DL (ref 8.5–10.1)
CALCIUM SERPL-MCNC: 9.9 MG/DL (ref 8.5–10.1)
CALCIUM SERPL-MCNC: NORMAL MG/DL (ref 8.5–10.1)
CANNABINOIDS UR QL: NOT DETECTED NG/ML
CHLORIDE SERPL-SCNC: 100 MMOL/L (ref 94–109)
CHLORIDE SERPL-SCNC: 101 MMOL/L (ref 94–109)
CHLORIDE SERPL-SCNC: 102 MMOL/L (ref 94–109)
CHLORIDE SERPL-SCNC: 104 MMOL/L (ref 94–109)
CHLORIDE SERPL-SCNC: 105 MMOL/L (ref 94–109)
CHLORIDE SERPL-SCNC: 105 MMOL/L (ref 94–109)
CHLORIDE SERPL-SCNC: 94 MMOL/L (ref 94–109)
CHLORIDE SERPL-SCNC: 95 MMOL/L (ref 94–109)
CHLORIDE SERPL-SCNC: 95 MMOL/L (ref 98–107)
CHLORIDE SERPL-SCNC: 96 MMOL/L (ref 94–109)
CHLORIDE SERPL-SCNC: 97 MMOL/L (ref 94–109)
CHLORIDE SERPL-SCNC: 99 MMOL/L (ref 94–109)
CHLORIDE SERPL-SCNC: NORMAL MMOL/L (ref 94–109)
CHLORIDE SERPLBLD-SCNC: 101 MMOL/L (ref 98–109)
CHLORIDE SERPLBLD-SCNC: 99 MMOL/L (ref 98–109)
CHOLEST SERPL-MCNC: 103 MG/DL
CO2 BLDCOV-SCNC: 24 MMOL/L (ref 21–28)
CO2 SERPL-SCNC: 29 MMOL/L (ref 20–32)
CO2 SERPL-SCNC: 29 MMOL/L (ref 22–31)
CO2 SERPL-SCNC: 30 MMOL/L (ref 20–29)
CO2 SERPL-SCNC: 30 MMOL/L (ref 20–32)
CO2 SERPL-SCNC: 31 MMOL/L (ref 20–32)
CO2 SERPL-SCNC: 32 MMOL/L (ref 20–32)
CO2 SERPL-SCNC: 33 MMOL/L (ref 20–32)
CO2 SERPL-SCNC: 33 MMOL/L (ref 23–29)
CO2 SERPL-SCNC: 34 MMOL/L (ref 20–32)
CO2 SERPL-SCNC: 35 MMOL/L (ref 20–32)
CO2 SERPL-SCNC: 36 MMOL/L (ref 20–32)
CO2 SERPL-SCNC: 36 MMOL/L (ref 20–32)
CO2 SERPL-SCNC: 37 MMOL/L (ref 20–32)
CO2 SERPL-SCNC: 37 MMOL/L (ref 20–32)
CO2 SERPL-SCNC: 38 MMOL/L (ref 20–32)
CO2 SERPL-SCNC: 39 MMOL/L (ref 20–32)
CO2 SERPL-SCNC: 41 MMOL/L (ref 20–32)
CO2 SERPL-SCNC: 41 MMOL/L (ref 20–32)
CO2 SERPL-SCNC: NORMAL MMOL/L (ref 20–32)
COCAINE UR QL SCN: NOT DETECTED NG/ML
COLONOSCOPY: NORMAL
COLOR UR AUTO: ABNORMAL
COLOR UR AUTO: YELLOW
COLOR UR AUTO: YELLOW
CREAT SERPL-MCNC: 1.34 MG/DL (ref 0.52–1.04)
CREAT SERPL-MCNC: 1.39 MG/DL (ref 0.52–1.04)
CREAT SERPL-MCNC: 1.41 MG/DL (ref 0.52–1.04)
CREAT SERPL-MCNC: 1.51 MG/DL (ref 0.52–1.04)
CREAT SERPL-MCNC: 1.51 MG/DL (ref 0.52–1.04)
CREAT SERPL-MCNC: 1.56 MG/DL (ref 0.52–1.04)
CREAT SERPL-MCNC: 1.56 MG/DL (ref 0.55–1.02)
CREAT SERPL-MCNC: 1.73 MG/DL (ref 0.52–1.04)
CREAT SERPL-MCNC: 1.75 MG/DL (ref 0.52–1.04)
CREAT SERPL-MCNC: 1.77 MG/DL (ref 0.52–1.04)
CREAT SERPL-MCNC: 1.77 MG/DL (ref 0.52–1.04)
CREAT SERPL-MCNC: 1.84 MG/DL (ref 0.52–1.04)
CREAT SERPL-MCNC: 1.84 MG/DL (ref 0.52–1.04)
CREAT SERPL-MCNC: 1.89 MG/DL (ref 0.52–1.04)
CREAT SERPL-MCNC: 1.91 MG/DL (ref 0.52–1.04)
CREAT SERPL-MCNC: 1.91 MG/DL (ref 0.55–1.02)
CREAT SERPL-MCNC: 1.92 MG/DL (ref 0.52–1.04)
CREAT SERPL-MCNC: 1.93 MG/DL (ref 0.52–1.04)
CREAT SERPL-MCNC: 1.97 MG/DL (ref 0.52–1.04)
CREAT SERPL-MCNC: 1.98 MG/DL (ref 0.52–1.04)
CREAT SERPL-MCNC: 2.01 MG/DL (ref 0.52–1.04)
CREAT SERPL-MCNC: 2.02 MG/DL (ref 0.52–1.04)
CREAT SERPL-MCNC: 2.06 MG/DL (ref 0.52–1.04)
CREAT SERPL-MCNC: 2.1 MG/DL (ref 0.52–1.04)
CREAT SERPL-MCNC: 2.11 MG/DL (ref 0.52–1.04)
CREAT SERPL-MCNC: 2.12 MG/DL (ref 0.52–1.04)
CREAT SERPL-MCNC: 2.22 MG/DL (ref 0.52–1.04)
CREAT SERPL-MCNC: 2.28 MG/DL (ref 0.52–1.04)
CREAT SERPL-MCNC: 2.34 MG/DL (ref 0.7–1.3)
CREAT SERPL-MCNC: NORMAL MG/DL (ref 0.52–1.04)
CREAT UR-MCNC: 24 MG/DL
D-METHAMPHET UR QL: NOT DETECTED NG/ML
DIFFERENTIAL METHOD BLD: ABNORMAL
DIFFERENTIAL: ABNORMAL
EOSINOPHIL # BLD AUTO: 0 10E9/L (ref 0–0.7)
EOSINOPHIL # BLD AUTO: 0 10E9/L (ref 0–0.7)
EOSINOPHIL # BLD AUTO: 0.1 10E9/L (ref 0–0.7)
EOSINOPHIL # BLD AUTO: 0.2 10E9/L (ref 0–0.7)
EOSINOPHIL NFR BLD AUTO: 0.1 %
EOSINOPHIL NFR BLD AUTO: 0.4 %
EOSINOPHIL NFR BLD AUTO: 1.6 %
EOSINOPHIL NFR BLD AUTO: 1.7 %
EOSINOPHIL NFR BLD AUTO: 2.2 %
EOSINOPHIL NFR BLD AUTO: 2.5 %
EOSINOPHIL NFR BLD AUTO: 4.8 %
ERYTHROCYTE [DISTWIDTH] IN BLOOD BY AUTOMATED COUNT: 18 % (ref 10–15)
ERYTHROCYTE [DISTWIDTH] IN BLOOD BY AUTOMATED COUNT: 18.1 % (ref 10–15)
ERYTHROCYTE [DISTWIDTH] IN BLOOD BY AUTOMATED COUNT: 18.5 % (ref 10–15)
ERYTHROCYTE [DISTWIDTH] IN BLOOD BY AUTOMATED COUNT: 18.5 % (ref 10–15)
ERYTHROCYTE [DISTWIDTH] IN BLOOD BY AUTOMATED COUNT: 18.8 % (ref 10–15)
ERYTHROCYTE [DISTWIDTH] IN BLOOD BY AUTOMATED COUNT: 19.1 % (ref 10–15)
ERYTHROCYTE [DISTWIDTH] IN BLOOD BY AUTOMATED COUNT: 19.2 % (ref 10–15)
ERYTHROCYTE [DISTWIDTH] IN BLOOD BY AUTOMATED COUNT: 19.2 % (ref 10–15)
ERYTHROCYTE [DISTWIDTH] IN BLOOD BY AUTOMATED COUNT: 19.4 % (ref 10–15)
ERYTHROCYTE [DISTWIDTH] IN BLOOD BY AUTOMATED COUNT: 19.5 % (ref 10–15)
ERYTHROCYTE [DISTWIDTH] IN BLOOD BY AUTOMATED COUNT: 19.7 % (ref 10–15)
ERYTHROCYTE [DISTWIDTH] IN BLOOD BY AUTOMATED COUNT: 20.6 % (ref 10–15)
ERYTHROCYTE [DISTWIDTH] IN BLOOD BY AUTOMATED COUNT: 20.8 % (ref 10–15)
ERYTHROCYTE [DISTWIDTH] IN BLOOD BY AUTOMATED COUNT: 20.9 % (ref 10–15)
ERYTHROCYTE [DISTWIDTH] IN BLOOD BY AUTOMATED COUNT: 21.9 % (ref 10–15)
ERYTHROCYTE [DISTWIDTH] IN BLOOD BY AUTOMATED COUNT: 22.3 % (ref 10–15)
ERYTHROCYTE [DISTWIDTH] IN BLOOD BY AUTOMATED COUNT: 23.3 % (ref 10–15)
ERYTHROCYTE [DISTWIDTH] IN BLOOD BY AUTOMATED COUNT: 27 % (ref 11–15)
FERRITIN SERPL-MCNC: 14 NG/ML (ref 8–252)
FERRITIN SERPL-MCNC: 15 NG/ML (ref 8–252)
FERRITIN SERPL-MCNC: 89 NG/ML (ref 8–252)
GFR SERPL CREATININE-BSD FRML MDRD: 19 ML/MIN/{1.73_M2}
GFR SERPL CREATININE-BSD FRML MDRD: 20 ML/MIN/{1.73_M2}
GFR SERPL CREATININE-BSD FRML MDRD: 20 ML/MIN/{1.73_M2}
GFR SERPL CREATININE-BSD FRML MDRD: 21 ML/MIN/{1.73_M2}
GFR SERPL CREATININE-BSD FRML MDRD: 22 ML/MIN/{1.73_M2}
GFR SERPL CREATININE-BSD FRML MDRD: 22 ML/MIN/{1.73_M2}
GFR SERPL CREATININE-BSD FRML MDRD: 23 ML/MIN/{1.73_M2}
GFR SERPL CREATININE-BSD FRML MDRD: 24 ML/MIN/1.73M2
GFR SERPL CREATININE-BSD FRML MDRD: 24 ML/MIN/{1.73_M2}
GFR SERPL CREATININE-BSD FRML MDRD: 25 ML/MIN/{1.73_M2}
GFR SERPL CREATININE-BSD FRML MDRD: 25 ML/MIN/{1.73_M2}
GFR SERPL CREATININE-BSD FRML MDRD: 26 ML/MIN/{1.73_M2}
GFR SERPL CREATININE-BSD FRML MDRD: 26 ML/MIN/{1.73_M2}
GFR SERPL CREATININE-BSD FRML MDRD: 27 ML/MIN/{1.73_M2}
GFR SERPL CREATININE-BSD FRML MDRD: 27 ML/MIN/{1.73_M2}
GFR SERPL CREATININE-BSD FRML MDRD: 31 ML/MIN/1.73M2
GFR SERPL CREATININE-BSD FRML MDRD: 31 ML/MIN/{1.73_M2}
GFR SERPL CREATININE-BSD FRML MDRD: 32 ML/MIN/{1.73_M2}
GFR SERPL CREATININE-BSD FRML MDRD: 32 ML/MIN/{1.73_M2}
GFR SERPL CREATININE-BSD FRML MDRD: 35 ML/MIN/{1.73_M2}
GFR SERPL CREATININE-BSD FRML MDRD: 35 ML/MIN/{1.73_M2}
GFR SERPL CREATININE-BSD FRML MDRD: 37 ML/MIN/{1.73_M2}
GFR SERPL CREATININE-BSD FRML MDRD: NORMAL ML/MIN/{1.73_M2}
GLUCOSE BLDC GLUCOMTR-MCNC: 103 MG/DL (ref 70–99)
GLUCOSE BLDC GLUCOMTR-MCNC: 104 MG/DL (ref 70–99)
GLUCOSE BLDC GLUCOMTR-MCNC: 106 MG/DL (ref 70–99)
GLUCOSE BLDC GLUCOMTR-MCNC: 109 MG/DL (ref 70–99)
GLUCOSE BLDC GLUCOMTR-MCNC: 109 MG/DL (ref 70–99)
GLUCOSE BLDC GLUCOMTR-MCNC: 115 MG/DL (ref 70–99)
GLUCOSE BLDC GLUCOMTR-MCNC: 115 MG/DL (ref 70–99)
GLUCOSE BLDC GLUCOMTR-MCNC: 116 MG/DL (ref 70–99)
GLUCOSE BLDC GLUCOMTR-MCNC: 120 MG/DL (ref 70–99)
GLUCOSE BLDC GLUCOMTR-MCNC: 121 MG/DL (ref 70–99)
GLUCOSE BLDC GLUCOMTR-MCNC: 126 MG/DL (ref 70–99)
GLUCOSE BLDC GLUCOMTR-MCNC: 128 MG/DL (ref 70–99)
GLUCOSE BLDC GLUCOMTR-MCNC: 129 MG/DL (ref 70–99)
GLUCOSE BLDC GLUCOMTR-MCNC: 131 MG/DL (ref 70–99)
GLUCOSE BLDC GLUCOMTR-MCNC: 132 MG/DL (ref 70–99)
GLUCOSE BLDC GLUCOMTR-MCNC: 132 MG/DL (ref 70–99)
GLUCOSE BLDC GLUCOMTR-MCNC: 134 MG/DL (ref 70–99)
GLUCOSE BLDC GLUCOMTR-MCNC: 136 MG/DL (ref 70–99)
GLUCOSE BLDC GLUCOMTR-MCNC: 137 MG/DL (ref 70–99)
GLUCOSE BLDC GLUCOMTR-MCNC: 139 MG/DL (ref 70–99)
GLUCOSE BLDC GLUCOMTR-MCNC: 139 MG/DL (ref 70–99)
GLUCOSE BLDC GLUCOMTR-MCNC: 140 MG/DL (ref 70–99)
GLUCOSE BLDC GLUCOMTR-MCNC: 141 MG/DL (ref 70–99)
GLUCOSE BLDC GLUCOMTR-MCNC: 143 MG/DL (ref 70–99)
GLUCOSE BLDC GLUCOMTR-MCNC: 144 MG/DL (ref 70–99)
GLUCOSE BLDC GLUCOMTR-MCNC: 147 MG/DL (ref 70–99)
GLUCOSE BLDC GLUCOMTR-MCNC: 149 MG/DL (ref 70–99)
GLUCOSE BLDC GLUCOMTR-MCNC: 150 MG/DL (ref 70–99)
GLUCOSE BLDC GLUCOMTR-MCNC: 154 MG/DL (ref 70–99)
GLUCOSE BLDC GLUCOMTR-MCNC: 154 MG/DL (ref 70–99)
GLUCOSE BLDC GLUCOMTR-MCNC: 155 MG/DL (ref 70–99)
GLUCOSE BLDC GLUCOMTR-MCNC: 155 MG/DL (ref 70–99)
GLUCOSE BLDC GLUCOMTR-MCNC: 156 MG/DL (ref 70–99)
GLUCOSE BLDC GLUCOMTR-MCNC: 157 MG/DL (ref 70–99)
GLUCOSE BLDC GLUCOMTR-MCNC: 161 MG/DL (ref 70–99)
GLUCOSE BLDC GLUCOMTR-MCNC: 163 MG/DL (ref 70–99)
GLUCOSE BLDC GLUCOMTR-MCNC: 163 MG/DL (ref 70–99)
GLUCOSE BLDC GLUCOMTR-MCNC: 164 MG/DL (ref 70–99)
GLUCOSE BLDC GLUCOMTR-MCNC: 165 MG/DL (ref 70–99)
GLUCOSE BLDC GLUCOMTR-MCNC: 167 MG/DL (ref 70–99)
GLUCOSE BLDC GLUCOMTR-MCNC: 168 MG/DL (ref 70–99)
GLUCOSE BLDC GLUCOMTR-MCNC: 170 MG/DL (ref 70–99)
GLUCOSE BLDC GLUCOMTR-MCNC: 170 MG/DL (ref 70–99)
GLUCOSE BLDC GLUCOMTR-MCNC: 171 MG/DL (ref 70–99)
GLUCOSE BLDC GLUCOMTR-MCNC: 171 MG/DL (ref 70–99)
GLUCOSE BLDC GLUCOMTR-MCNC: 172 MG/DL (ref 70–99)
GLUCOSE BLDC GLUCOMTR-MCNC: 174 MG/DL (ref 70–99)
GLUCOSE BLDC GLUCOMTR-MCNC: 174 MG/DL (ref 70–99)
GLUCOSE BLDC GLUCOMTR-MCNC: 177 MG/DL (ref 70–99)
GLUCOSE BLDC GLUCOMTR-MCNC: 178 MG/DL (ref 70–99)
GLUCOSE BLDC GLUCOMTR-MCNC: 187 MG/DL (ref 70–99)
GLUCOSE BLDC GLUCOMTR-MCNC: 192 MG/DL (ref 70–99)
GLUCOSE BLDC GLUCOMTR-MCNC: 194 MG/DL (ref 70–99)
GLUCOSE BLDC GLUCOMTR-MCNC: 197 MG/DL (ref 70–99)
GLUCOSE BLDC GLUCOMTR-MCNC: 198 MG/DL (ref 70–99)
GLUCOSE BLDC GLUCOMTR-MCNC: 203 MG/DL (ref 70–99)
GLUCOSE BLDC GLUCOMTR-MCNC: 208 MG/DL (ref 70–99)
GLUCOSE BLDC GLUCOMTR-MCNC: 208 MG/DL (ref 70–99)
GLUCOSE BLDC GLUCOMTR-MCNC: 212 MG/DL (ref 70–99)
GLUCOSE BLDC GLUCOMTR-MCNC: 217 MG/DL (ref 70–99)
GLUCOSE BLDC GLUCOMTR-MCNC: 234 MG/DL (ref 70–99)
GLUCOSE BLDC GLUCOMTR-MCNC: 235 MG/DL (ref 70–99)
GLUCOSE BLDC GLUCOMTR-MCNC: 236 MG/DL (ref 70–99)
GLUCOSE BLDC GLUCOMTR-MCNC: 242 MG/DL (ref 70–99)
GLUCOSE BLDC GLUCOMTR-MCNC: 75 MG/DL (ref 70–99)
GLUCOSE BLDC GLUCOMTR-MCNC: 86 MG/DL (ref 70–99)
GLUCOSE BLDC GLUCOMTR-MCNC: 87 MG/DL (ref 70–99)
GLUCOSE BLDC GLUCOMTR-MCNC: 94 MG/DL (ref 70–99)
GLUCOSE SERPL-MCNC: 102 MG/DL (ref 70–99)
GLUCOSE SERPL-MCNC: 105 MG/DL (ref 70–99)
GLUCOSE SERPL-MCNC: 109 MG/DL (ref 70–99)
GLUCOSE SERPL-MCNC: 125 MG/DL (ref 70–99)
GLUCOSE SERPL-MCNC: 129 MG/DL (ref 70–99)
GLUCOSE SERPL-MCNC: 131 MG/DL (ref 70–100)
GLUCOSE SERPL-MCNC: 134 MG/DL (ref 70–99)
GLUCOSE SERPL-MCNC: 142 MG/DL (ref 70–99)
GLUCOSE SERPL-MCNC: 142 MG/DL (ref 70–99)
GLUCOSE SERPL-MCNC: 143 MG/DL (ref 70–99)
GLUCOSE SERPL-MCNC: 144 MG/DL (ref 70–99)
GLUCOSE SERPL-MCNC: 147 MG/DL (ref 70–99)
GLUCOSE SERPL-MCNC: 148 MG/DL (ref 70–99)
GLUCOSE SERPL-MCNC: 149 MG/DL (ref 70–99)
GLUCOSE SERPL-MCNC: 152 MG/DL (ref 70–99)
GLUCOSE SERPL-MCNC: 153 MG/DL (ref 70–99)
GLUCOSE SERPL-MCNC: 159 MG/DL (ref 70–99)
GLUCOSE SERPL-MCNC: 161 MG/DL (ref 70–99)
GLUCOSE SERPL-MCNC: 163 MG/DL (ref 70–99)
GLUCOSE SERPL-MCNC: 166 MG/DL (ref 70–99)
GLUCOSE SERPL-MCNC: 167 MG/DL (ref 70–99)
GLUCOSE SERPL-MCNC: 168 MG/DL (ref 70–99)
GLUCOSE SERPL-MCNC: 173 MG/DL (ref 70–105)
GLUCOSE SERPL-MCNC: 188 MG/DL (ref 70–99)
GLUCOSE SERPL-MCNC: 190 MG/DL (ref 70–99)
GLUCOSE SERPL-MCNC: 60 MG/DL (ref 70–99)
GLUCOSE SERPL-MCNC: 65 MG/DL (ref 70–99)
GLUCOSE SERPL-MCNC: 86 MG/DL (ref 70–99)
GLUCOSE SERPL-MCNC: 92 MG/DL (ref 70–100)
GLUCOSE SERPL-MCNC: NORMAL MG/DL (ref 70–99)
GLUCOSE UR STRIP-MCNC: NEGATIVE MG/DL
HBA1C MFR BLD: 6.3 % (ref 0–5.6)
HCT VFR BLD AUTO: 25.4 % (ref 35–47)
HCT VFR BLD AUTO: 27.1 % (ref 35–47)
HCT VFR BLD AUTO: 27.7 % (ref 35–47)
HCT VFR BLD AUTO: 28.6 % (ref 35–47)
HCT VFR BLD AUTO: 28.8 % (ref 35–47)
HCT VFR BLD AUTO: 29.5 % (ref 35–47)
HCT VFR BLD AUTO: 30.6 % (ref 35–47)
HCT VFR BLD AUTO: 31.2 % (ref 35–47)
HCT VFR BLD AUTO: 31.4 % (ref 35–47)
HCT VFR BLD AUTO: 31.6 % (ref 35–47)
HCT VFR BLD AUTO: 32.2 % (ref 35–47)
HCT VFR BLD AUTO: 34.2 % (ref 35–47)
HCT VFR BLD AUTO: 35.2 % (ref 35–47)
HCT VFR BLD AUTO: 35.6 % (ref 35–47)
HCT VFR BLD AUTO: 36.5 % (ref 35–46)
HCT VFR BLD AUTO: 38.8 % (ref 35–47)
HDLC SERPL-MCNC: 46 MG/DL
HEMOCCULT STL QL: POSITIVE
HEMOGLOBIN: 10.3 G/DL (ref 11.8–15.5)
HEMOGLOBIN: 10.9 G/DL (ref 12–15.5)
HEMOGLOBIN: 9 G/DL (ref 12–15.5)
HGB BLD-MCNC: 10 G/DL (ref 11.7–15.7)
HGB BLD-MCNC: 10 G/DL (ref 11.7–15.7)
HGB BLD-MCNC: 10.6 G/DL (ref 11.7–15.7)
HGB BLD-MCNC: 10.8 G/DL (ref 11.7–15.7)
HGB BLD-MCNC: 11.3 G/DL (ref 11.7–15.7)
HGB BLD-MCNC: 7 G/DL (ref 11.7–15.7)
HGB BLD-MCNC: 7.8 G/DL (ref 11.7–15.7)
HGB BLD-MCNC: 7.8 G/DL (ref 11.7–15.7)
HGB BLD-MCNC: 8 G/DL (ref 11.7–15.7)
HGB BLD-MCNC: 8.1 G/DL (ref 11.7–15.7)
HGB BLD-MCNC: 8.1 G/DL (ref 11.7–15.7)
HGB BLD-MCNC: 8.4 G/DL (ref 11.7–15.7)
HGB BLD-MCNC: 8.4 G/DL (ref 11.7–15.7)
HGB BLD-MCNC: 8.6 G/DL (ref 11.7–15.7)
HGB BLD-MCNC: 8.6 G/DL (ref 11.7–15.7)
HGB BLD-MCNC: 8.7 G/DL (ref 11.7–15.7)
HGB BLD-MCNC: 8.8 G/DL (ref 11.7–15.7)
HGB BLD-MCNC: 8.8 G/DL (ref 11.7–15.7)
HGB BLD-MCNC: 8.9 G/DL (ref 11.7–15.7)
HGB BLD-MCNC: 8.9 G/DL (ref 11.7–15.7)
HGB BLD-MCNC: 9 G/DL (ref 11.7–15.7)
HGB BLD-MCNC: 9.1 G/DL (ref 11.7–15.7)
HGB BLD-MCNC: 9.1 G/DL (ref 11.7–15.7)
HGB BLD-MCNC: 9.2 G/DL (ref 11.7–15.7)
HGB BLD-MCNC: 9.2 G/DL (ref 11.7–15.7)
HGB BLD-MCNC: 9.3 G/DL (ref 11.7–15.7)
HGB BLD-MCNC: 9.4 G/DL (ref 11.7–15.7)
HGB BLD-MCNC: 9.5 G/DL (ref 11.7–15.7)
HGB BLD-MCNC: 9.7 G/DL (ref 11.7–15.7)
HGB UR QL STRIP: NEGATIVE
HYALINE CASTS #/AREA URNS LPF: 6 /LPF (ref 0–2)
HYALINE CASTS #/AREA URNS LPF: 8 /LPF (ref 0–2)
IMM GRANULOCYTES # BLD: 0 10E9/L (ref 0–0.4)
IMM GRANULOCYTES NFR BLD: 0 %
IMM GRANULOCYTES NFR BLD: 0.1 %
IMM GRANULOCYTES NFR BLD: 0.2 %
INR PPP: 1.17 (ref 0.86–1.14)
INTERPRETATION ECG - MUSE: NORMAL
INTERPRETATION ECG - MUSE: NORMAL
IRON SATN MFR SERPL: 5 % (ref 15–46)
IRON SERPL-MCNC: 17 UG/DL (ref 35–180)
IRON SERPL-MCNC: 20 UG/DL (ref 35–180)
KETONES UR STRIP-MCNC: NEGATIVE MG/DL
LACTATE BLD-SCNC: 1.3 MMOL/L (ref 0.7–2.1)
LACTATE BLD-SCNC: 1.7 MMOL/L (ref 0.7–2)
LDLC SERPL CALC-MCNC: 44 MG/DL
LEUKOCYTE ESTERASE UR QL STRIP: ABNORMAL
LEUKOCYTE ESTERASE UR QL STRIP: NEGATIVE
LEUKOCYTE ESTERASE UR QL STRIP: NEGATIVE
LYMPHOCYTES # BLD AUTO: 0.4 10E9/L (ref 0.8–5.3)
LYMPHOCYTES # BLD AUTO: 0.5 10E9/L (ref 0.8–5.3)
LYMPHOCYTES # BLD AUTO: 0.6 10E9/L (ref 0.8–5.3)
LYMPHOCYTES # BLD AUTO: 0.7 10E9/L (ref 0.8–5.3)
LYMPHOCYTES # BLD AUTO: 1 10E9/L (ref 0.8–5.3)
LYMPHOCYTES NFR BLD AUTO: 11.4 %
LYMPHOCYTES NFR BLD AUTO: 12.7 %
LYMPHOCYTES NFR BLD AUTO: 20.7 %
LYMPHOCYTES NFR BLD AUTO: 4 %
LYMPHOCYTES NFR BLD AUTO: 6.1 %
LYMPHOCYTES NFR BLD AUTO: 8.7 %
LYMPHOCYTES NFR BLD AUTO: 8.8 %
Lab: NORMAL
Lab: NORMAL
MAGNESIUM SERPL-MCNC: 2.7 MG/DL (ref 1.6–2.3)
MCH RBC QN AUTO: 22 PG (ref 26.5–33)
MCH RBC QN AUTO: 22.3 PG (ref 26.5–33)
MCH RBC QN AUTO: 23.1 PG (ref 26.5–33)
MCH RBC QN AUTO: 23.1 PG (ref 26.5–33)
MCH RBC QN AUTO: 23.5 PG (ref 26.5–33)
MCH RBC QN AUTO: 24.2 PG (ref 26.5–33)
MCH RBC QN AUTO: 24.3 PG (ref 26.5–33)
MCH RBC QN AUTO: 24.9 PG (ref 26.5–33)
MCH RBC QN AUTO: 24.9 PG (ref 26.5–33)
MCH RBC QN AUTO: 25.1 PG (ref 26.5–33)
MCH RBC QN AUTO: 25.4 PG (ref 26.5–33)
MCH RBC QN AUTO: 25.7 PG (ref 26.5–33)
MCH RBC QN AUTO: 26.2 PG (ref 26.5–33)
MCH RBC QN AUTO: 26.6 PG (ref 26.5–33)
MCH RBC QN AUTO: 26.7 PG (ref 26.5–33)
MCHC RBC AUTO-ENTMCNC: 27.6 G/DL (ref 31.5–36.5)
MCHC RBC AUTO-ENTMCNC: 28.2 G/DL (ref 31.5–36.5)
MCHC RBC AUTO-ENTMCNC: 28.4 G/DL (ref 31.5–36.5)
MCHC RBC AUTO-ENTMCNC: 28.5 G/DL (ref 31.5–36.5)
MCHC RBC AUTO-ENTMCNC: 28.5 G/DL (ref 31.5–36.5)
MCHC RBC AUTO-ENTMCNC: 28.8 G/DL (ref 31.5–36.5)
MCHC RBC AUTO-ENTMCNC: 28.8 G/DL (ref 31.5–36.5)
MCHC RBC AUTO-ENTMCNC: 29.1 G/DL (ref 31.5–36.5)
MCHC RBC AUTO-ENTMCNC: 29.2 G/DL (ref 31.5–36.5)
MCHC RBC AUTO-ENTMCNC: 29.4 G/DL (ref 31.5–36.5)
MCHC RBC AUTO-ENTMCNC: 29.5 G/DL (ref 31.5–36.5)
MCHC RBC AUTO-ENTMCNC: 29.6 G/DL (ref 31.5–36.5)
MCHC RBC AUTO-ENTMCNC: 29.8 G/DL (ref 31.5–36.5)
MCHC RBC AUTO-ENTMCNC: 30.3 G/DL (ref 31.5–36.5)
MCHC RBC AUTO-ENTMCNC: 31 G/DL (ref 31.5–36.5)
MCV RBC AUTO: 79 FL (ref 78–100)
MCV RBC AUTO: 80 FL (ref 78–100)
MCV RBC AUTO: 80 FL (ref 78–100)
MCV RBC AUTO: 81 FL (ref 78–100)
MCV RBC AUTO: 82 FL (ref 78–100)
MCV RBC AUTO: 83 FL (ref 78–100)
MCV RBC AUTO: 84 FL (ref 78–100)
MCV RBC AUTO: 85 FL (ref 78–100)
MCV RBC AUTO: 87 FL (ref 78–100)
MCV RBC AUTO: 88 FL (ref 78–100)
MCV RBC AUTO: 89 FL (ref 78–100)
MCV RBC AUTO: 91 FL (ref 78–100)
MCV RBC AUTO: 92 FL (ref 78–100)
MCV RBC AUTO: 92.9 FL (ref 80–100)
METHADONE UR QL SCN: NOT DETECTED NG/ML
MICROALBUMIN UR-MCNC: 16 MG/L
MICROALBUMIN/CREAT UR: 66.53 MG/G CR (ref 0–25)
MONOCYTES # BLD AUTO: 0.6 10E9/L (ref 0–1.3)
MONOCYTES # BLD AUTO: 0.7 10E9/L (ref 0–1.3)
MONOCYTES # BLD AUTO: 0.9 10E9/L (ref 0–1.3)
MONOCYTES # BLD AUTO: 0.9 10E9/L (ref 0–1.3)
MONOCYTES # BLD AUTO: 1 10E9/L (ref 0–1.3)
MONOCYTES # BLD AUTO: 1.2 10E9/L (ref 0–1.3)
MONOCYTES # BLD AUTO: 1.2 10E9/L (ref 0–1.3)
MONOCYTES NFR BLD AUTO: 10.4 %
MONOCYTES NFR BLD AUTO: 11.4 %
MONOCYTES NFR BLD AUTO: 11.7 %
MONOCYTES NFR BLD AUTO: 12.6 %
MONOCYTES NFR BLD AUTO: 17.9 %
MONOCYTES NFR BLD AUTO: 18.7 %
MONOCYTES NFR BLD AUTO: 19.5 %
MUCOUS THREADS #/AREA URNS LPF: PRESENT /LPF
MUCOUS THREADS #/AREA URNS LPF: PRESENT /LPF
NEUTROPHILS # BLD AUTO: 2.8 10E9/L (ref 1.6–8.3)
NEUTROPHILS # BLD AUTO: 3.6 10E9/L (ref 1.6–8.3)
NEUTROPHILS # BLD AUTO: 3.6 10E9/L (ref 1.6–8.3)
NEUTROPHILS # BLD AUTO: 4.3 10E9/L (ref 1.6–8.3)
NEUTROPHILS # BLD AUTO: 4.4 10E9/L (ref 1.6–8.3)
NEUTROPHILS # BLD AUTO: 7.1 10E9/L (ref 1.6–8.3)
NEUTROPHILS # BLD AUTO: 8.7 10E9/L (ref 1.6–8.3)
NEUTROPHILS NFR BLD AUTO: 56.2 %
NEUTROPHILS NFR BLD AUTO: 68.6 %
NEUTROPHILS NFR BLD AUTO: 70.4 %
NEUTROPHILS NFR BLD AUTO: 72.9 %
NEUTROPHILS NFR BLD AUTO: 73.8 %
NEUTROPHILS NFR BLD AUTO: 82.9 %
NEUTROPHILS NFR BLD AUTO: 83.8 %
NITRATE UR QL: NEGATIVE
NONHDLC SERPL-MCNC: 57 MG/DL
NRBC # BLD AUTO: 0 10*3/UL
NRBC # BLD AUTO: ABNORMAL 10*3/UL
NRBC BLD AUTO-RTO: 0 /100
NRBC BLD AUTO-RTO: ABNORMAL /100
NT-PROBNP SERPL-MCNC: 3144 PG/ML (ref 0–450)
NT-PROBNP SERPL-MCNC: 4482 PG/ML (ref 0–450)
NT-PROBNP SERPL-MCNC: 9289 PG/ML (ref 0–1800)
NT-PROBNP SERPL-MCNC: 9312 PG/ML (ref 0–1800)
NT-PROBNP SERPL-MCNC: ABNORMAL PG/ML (ref 0–1800)
NUM BPU REQUESTED: 1
NUM BPU REQUESTED: 1
OPIATES UR QL SCN: ABNORMAL NG/ML
OXYCODONE UR QL SCN: NOT DETECTED NG/ML
PCO2 BLDV: 41 MM HG (ref 40–50)
PCP UR QL SCN: NOT DETECTED NG/ML
PH BLDV: 7.37 PH (ref 7.32–7.43)
PH UR STRIP: 6.5 PH (ref 5–7)
PH UR STRIP: 7 PH (ref 5–7)
PH UR STRIP: 7.5 PH (ref 5–7)
PLATELET # BLD AUTO: 136 10E9/L (ref 150–450)
PLATELET # BLD AUTO: 141 10E9/L (ref 150–450)
PLATELET # BLD AUTO: 146 10E9/L (ref 150–450)
PLATELET # BLD AUTO: 152 10E9/L (ref 150–450)
PLATELET # BLD AUTO: 154 10E9/L (ref 150–450)
PLATELET # BLD AUTO: 159 10E9/L (ref 150–450)
PLATELET # BLD AUTO: 164 10E9/L (ref 150–450)
PLATELET # BLD AUTO: 169 10E9/L (ref 150–450)
PLATELET # BLD AUTO: 177 10E9/L (ref 150–450)
PLATELET # BLD AUTO: 195 10E9/L (ref 150–450)
PLATELET # BLD AUTO: 202 K/CMM (ref 140–450)
PLATELET # BLD AUTO: 208 10E9/L (ref 150–450)
PLATELET # BLD AUTO: 214 10E9/L (ref 150–450)
PLATELET # BLD AUTO: 217 10E9/L (ref 150–450)
PLATELET # BLD AUTO: 240 10E9/L (ref 150–450)
PLATELET # BLD AUTO: 276 10E9/L (ref 150–450)
PO2 BLDV: 41 MM HG (ref 25–47)
POTASSIUM SERPL-SCNC: 3.2 MMOL/L (ref 3.4–5.3)
POTASSIUM SERPL-SCNC: 3.3 MMOL/L (ref 3.4–5.3)
POTASSIUM SERPL-SCNC: 3.5 MMOL/L (ref 3.4–5.3)
POTASSIUM SERPL-SCNC: 3.7 MMOL/L (ref 3.4–5.3)
POTASSIUM SERPL-SCNC: 3.8 MMOL/L (ref 3.4–5.3)
POTASSIUM SERPL-SCNC: 3.8 MMOL/L (ref 3.4–5.3)
POTASSIUM SERPL-SCNC: 3.9 MMOL/L (ref 3.4–5.3)
POTASSIUM SERPL-SCNC: 4 MMOL/L (ref 3.4–5.3)
POTASSIUM SERPL-SCNC: 4 MMOL/L (ref 3.4–5.3)
POTASSIUM SERPL-SCNC: 4.1 MMOL/L (ref 3.4–5.3)
POTASSIUM SERPL-SCNC: 4.2 MMOL/L (ref 3.4–5.3)
POTASSIUM SERPL-SCNC: 4.5 MMOL/L (ref 3.4–5.3)
POTASSIUM SERPL-SCNC: 4.5 MMOL/L (ref 3.5–5.1)
POTASSIUM SERPL-SCNC: 4.6 MMOL/L (ref 3.4–5.3)
POTASSIUM SERPL-SCNC: 4.7 MMOL/L (ref 3.4–5.3)
POTASSIUM SERPL-SCNC: 5 MMOL/L (ref 3.4–5.3)
POTASSIUM SERPL-SCNC: 5 MMOL/L (ref 3.5–5.2)
POTASSIUM SERPL-SCNC: 5.4 MMOL/L (ref 3.5–5.1)
POTASSIUM SERPL-SCNC: NORMAL MMOL/L (ref 3.4–5.3)
PROCALCITONIN SERPL-MCNC: 0.05 NG/ML
PROPOXYPH UR QL: NOT DETECTED NG/ML
PROT SERPL-MCNC: 6.8 G/DL (ref 6.8–8.8)
PROT SERPL-MCNC: 7.1 G/DL (ref 6.8–8.8)
PROT SERPL-MCNC: 7.2 G/DL (ref 6.8–8.8)
PROT SERPL-MCNC: 7.3 G/DL (ref 6.8–8.8)
PROT SERPL-MCNC: 7.4 G/DL (ref 6.8–8.8)
PROT SERPL-MCNC: NORMAL G/DL (ref 6.8–8.8)
RBC # BLD AUTO: 3.11 10E12/L (ref 3.8–5.2)
RBC # BLD AUTO: 3.18 10E12/L (ref 3.8–5.2)
RBC # BLD AUTO: 3.38 10E12/L (ref 3.8–5.2)
RBC # BLD AUTO: 3.47 10E12/L (ref 3.8–5.2)
RBC # BLD AUTO: 3.5 10E12/L (ref 3.8–5.2)
RBC # BLD AUTO: 3.53 10E12/L (ref 3.8–5.2)
RBC # BLD AUTO: 3.54 10E12/L (ref 3.8–5.2)
RBC # BLD AUTO: 3.7 10E12/L (ref 3.8–5.2)
RBC # BLD AUTO: 3.79 10E12/L (ref 3.8–5.2)
RBC # BLD AUTO: 3.79 10E12/L (ref 3.8–5.2)
RBC # BLD AUTO: 3.82 10E12/L (ref 3.8–5.2)
RBC # BLD AUTO: 3.85 10E12/L (ref 3.8–5.2)
RBC # BLD AUTO: 3.86 10E12/L (ref 3.8–5.2)
RBC # BLD AUTO: 3.87 10E12/L (ref 3.8–5.2)
RBC # BLD AUTO: 3.93 M/CMM (ref 3.7–5.2)
RBC # BLD AUTO: 4.05 10E12/L (ref 3.8–5.2)
RBC # BLD AUTO: 4.13 10E12/L (ref 3.8–5.2)
RBC # BLD AUTO: 4.25 10E12/L (ref 3.8–5.2)
RBC #/AREA URNS AUTO: 8 /HPF (ref 0–2)
RBC #/AREA URNS AUTO: <1 /HPF (ref 0–2)
RBC #/AREA URNS AUTO: <1 /HPF (ref 0–2)
SAO2 % BLDV FROM PO2: 75 %
SODIUM SERPL-SCNC: 133 MMOL/L (ref 136–145)
SODIUM SERPL-SCNC: 136 MMOL/L (ref 133–144)
SODIUM SERPL-SCNC: 137 MMOL/L (ref 133–144)
SODIUM SERPL-SCNC: 138 MMOL/L (ref 133–144)
SODIUM SERPL-SCNC: 139 MMOL/L (ref 133–144)
SODIUM SERPL-SCNC: 139 MMOL/L (ref 133–144)
SODIUM SERPL-SCNC: 140 MMOL/L (ref 133–144)
SODIUM SERPL-SCNC: 140 MMOL/L (ref 136–145)
SODIUM SERPL-SCNC: 140 MMOL/L (ref 136–145)
SODIUM SERPL-SCNC: 141 MMOL/L (ref 133–144)
SODIUM SERPL-SCNC: 142 MMOL/L (ref 133–144)
SODIUM SERPL-SCNC: 143 MMOL/L (ref 133–144)
SODIUM SERPL-SCNC: 144 MMOL/L (ref 133–144)
SODIUM SERPL-SCNC: 145 MMOL/L (ref 133–144)
SODIUM SERPL-SCNC: NORMAL MMOL/L (ref 133–144)
SOURCE: ABNORMAL
SP GR UR STRIP: 1.01 (ref 1–1.03)
SPECIMEN EXP DATE BLD: NORMAL
SPECIMEN SOURCE: NORMAL
SPECIMEN SOURCE: NORMAL
SQUAMOUS #/AREA URNS AUTO: 1 /HPF (ref 0–1)
SQUAMOUS #/AREA URNS AUTO: 2 /HPF (ref 0–1)
SQUAMOUS #/AREA URNS AUTO: 6 /HPF (ref 0–1)
TIBC SERPL-MCNC: 383 UG/DL (ref 240–430)
TRANSFUSION STATUS PATIENT QL: NORMAL
TRICYCLICS UR QL SCN: NOT DETECTED NG/ML
TRIGL SERPL-MCNC: 65 MG/DL
TROPONIN I SERPL-MCNC: 0.02 UG/L (ref 0–0.04)
TROPONIN I SERPL-MCNC: 0.02 UG/L (ref 0–0.04)
TROPONIN I SERPL-MCNC: <0.015 UG/L (ref 0–0.04)
TROPONIN I SERPL-MCNC: <0.015 UG/L (ref 0–0.04)
UPPER GI ENDOSCOPY: NORMAL
UROBILINOGEN UR STRIP-MCNC: NORMAL MG/DL (ref 0–2)
WBC # BLD AUTO: 10.3 10E9/L (ref 4–11)
WBC # BLD AUTO: 10.4 10E9/L (ref 4–11)
WBC # BLD AUTO: 4.3 10E9/L (ref 4–11)
WBC # BLD AUTO: 4.4 10E9/L (ref 4–11)
WBC # BLD AUTO: 4.6 10E9/L (ref 4–11)
WBC # BLD AUTO: 4.9 10E9/L (ref 4–11)
WBC # BLD AUTO: 5 10E9/L (ref 4–11)
WBC # BLD AUTO: 5 10E9/L (ref 4–11)
WBC # BLD AUTO: 5.2 10E9/L (ref 4–11)
WBC # BLD AUTO: 5.2 10E9/L (ref 4–11)
WBC # BLD AUTO: 5.6 10E9/L (ref 4–11)
WBC # BLD AUTO: 5.8 10E9/L (ref 4–11)
WBC # BLD AUTO: 6.2 10E9/L (ref 4–11)
WBC # BLD AUTO: 6.4 K/CMM (ref 3.8–11)
WBC # BLD AUTO: 6.7 10E9/L (ref 4–11)
WBC # BLD AUTO: 8.6 10E9/L (ref 4–11)
WBC #/AREA URNS AUTO: 0 /HPF (ref 0–5)
WBC #/AREA URNS AUTO: 1 /HPF (ref 0–5)
WBC #/AREA URNS AUTO: 3 /HPF (ref 0–5)

## 2019-01-01 PROCEDURE — 99207 C FOOT EXAM  NO CHARGE: CPT | Performed by: INTERNAL MEDICINE

## 2019-01-01 PROCEDURE — 99233 SBSQ HOSP IP/OBS HIGH 50: CPT | Performed by: INTERNAL MEDICINE

## 2019-01-01 PROCEDURE — 93306 TTE W/DOPPLER COMPLETE: CPT | Mod: 26 | Performed by: INTERNAL MEDICINE

## 2019-01-01 PROCEDURE — 97530 THERAPEUTIC ACTIVITIES: CPT | Mod: GP | Performed by: PHYSICAL THERAPIST

## 2019-01-01 PROCEDURE — 99495 TRANSJ CARE MGMT MOD F2F 14D: CPT | Performed by: INTERNAL MEDICINE

## 2019-01-01 PROCEDURE — 40000275 ZZH STATISTIC RCP TIME EA 10 MIN

## 2019-01-01 PROCEDURE — 36415 COLL VENOUS BLD VENIPUNCTURE: CPT | Performed by: INTERNAL MEDICINE

## 2019-01-01 PROCEDURE — 25000132 ZZH RX MED GY IP 250 OP 250 PS 637: Performed by: HOSPITALIST

## 2019-01-01 PROCEDURE — 25800025 ZZH RX 258: Performed by: INTERNAL MEDICINE

## 2019-01-01 PROCEDURE — 99215 OFFICE O/P EST HI 40 MIN: CPT | Mod: 25 | Performed by: INTERNAL MEDICINE

## 2019-01-01 PROCEDURE — 99285 EMERGENCY DEPT VISIT HI MDM: CPT | Mod: 25

## 2019-01-01 PROCEDURE — 40000894 ZZH STATISTIC OT IP EVAL DEFER

## 2019-01-01 PROCEDURE — A9270 NON-COVERED ITEM OR SERVICE: HCPCS | Performed by: STUDENT IN AN ORGANIZED HEALTH CARE EDUCATION/TRAINING PROGRAM

## 2019-01-01 PROCEDURE — A9270 NON-COVERED ITEM OR SERVICE: HCPCS | Mod: GY | Performed by: INTERNAL MEDICINE

## 2019-01-01 PROCEDURE — 80053 COMPREHEN METABOLIC PANEL: CPT | Performed by: INTERNAL MEDICINE

## 2019-01-01 PROCEDURE — 25000132 ZZH RX MED GY IP 250 OP 250 PS 637: Performed by: STUDENT IN AN ORGANIZED HEALTH CARE EDUCATION/TRAINING PROGRAM

## 2019-01-01 PROCEDURE — 82803 BLOOD GASES ANY COMBINATION: CPT

## 2019-01-01 PROCEDURE — 25000128 H RX IP 250 OP 636: Performed by: HOSPITALIST

## 2019-01-01 PROCEDURE — A9270 NON-COVERED ITEM OR SERVICE: HCPCS | Performed by: HOSPITALIST

## 2019-01-01 PROCEDURE — 94640 AIRWAY INHALATION TREATMENT: CPT | Mod: 76

## 2019-01-01 PROCEDURE — 97530 THERAPEUTIC ACTIVITIES: CPT | Mod: GP

## 2019-01-01 PROCEDURE — 71046 X-RAY EXAM CHEST 2 VIEWS: CPT

## 2019-01-01 PROCEDURE — 00000146 ZZHCL STATISTIC GLUCOSE BY METER IP

## 2019-01-01 PROCEDURE — A9270 NON-COVERED ITEM OR SERVICE: HCPCS | Performed by: INTERNAL MEDICINE

## 2019-01-01 PROCEDURE — 85018 HEMOGLOBIN: CPT | Performed by: HOSPITALIST

## 2019-01-01 PROCEDURE — 83550 IRON BINDING TEST: CPT | Performed by: HOSPITALIST

## 2019-01-01 PROCEDURE — 36415 COLL VENOUS BLD VENIPUNCTURE: CPT | Performed by: NURSE PRACTITIONER

## 2019-01-01 PROCEDURE — 25000132 ZZH RX MED GY IP 250 OP 250 PS 637: Performed by: PHYSICIAN ASSISTANT

## 2019-01-01 PROCEDURE — 83880 ASSAY OF NATRIURETIC PEPTIDE: CPT | Performed by: INTERNAL MEDICINE

## 2019-01-01 PROCEDURE — 80048 BASIC METABOLIC PNL TOTAL CA: CPT | Performed by: STUDENT IN AN ORGANIZED HEALTH CARE EDUCATION/TRAINING PROGRAM

## 2019-01-01 PROCEDURE — 25500064 ZZH RX 255 OP 636: Performed by: INTERNAL MEDICINE

## 2019-01-01 PROCEDURE — 99607 MTMS BY PHARM ADDL 15 MIN: CPT | Performed by: PHARMACIST

## 2019-01-01 PROCEDURE — 25000132 ZZH RX MED GY IP 250 OP 250 PS 637: Performed by: INTERNAL MEDICINE

## 2019-01-01 PROCEDURE — 82728 ASSAY OF FERRITIN: CPT | Performed by: INTERNAL MEDICINE

## 2019-01-01 PROCEDURE — 96361 HYDRATE IV INFUSION ADD-ON: CPT

## 2019-01-01 PROCEDURE — 21000001 ZZH R&B HEART CARE

## 2019-01-01 PROCEDURE — 80048 BASIC METABOLIC PNL TOTAL CA: CPT | Performed by: HOSPITALIST

## 2019-01-01 PROCEDURE — 99214 OFFICE O/P EST MOD 30 MIN: CPT | Mod: 25 | Performed by: INTERNAL MEDICINE

## 2019-01-01 PROCEDURE — 99441 ZZC PHYSICIAN TELEPHONE EVALUATION 5-10 MIN: CPT | Performed by: INTERNAL MEDICINE

## 2019-01-01 PROCEDURE — 97116 GAIT TRAINING THERAPY: CPT | Mod: GP

## 2019-01-01 PROCEDURE — 99232 SBSQ HOSP IP/OBS MODERATE 35: CPT | Performed by: INTERNAL MEDICINE

## 2019-01-01 PROCEDURE — 99605 MTMS BY PHARM NP 15 MIN: CPT | Performed by: PHARMACIST

## 2019-01-01 PROCEDURE — 25000132 ZZH RX MED GY IP 250 OP 250 PS 637: Mod: GY | Performed by: INTERNAL MEDICINE

## 2019-01-01 PROCEDURE — 97535 SELF CARE MNGMENT TRAINING: CPT | Mod: GO | Performed by: OCCUPATIONAL THERAPY ASSISTANT

## 2019-01-01 PROCEDURE — 96374 THER/PROPH/DIAG INJ IV PUSH: CPT

## 2019-01-01 PROCEDURE — 82728 ASSAY OF FERRITIN: CPT | Performed by: HOSPITALIST

## 2019-01-01 PROCEDURE — 25000132 ZZH RX MED GY IP 250 OP 250 PS 637: Mod: GY | Performed by: HOSPITALIST

## 2019-01-01 PROCEDURE — 99223 1ST HOSP IP/OBS HIGH 75: CPT | Mod: AI | Performed by: HOSPITALIST

## 2019-01-01 PROCEDURE — 25000128 H RX IP 250 OP 636: Performed by: INTERNAL MEDICINE

## 2019-01-01 PROCEDURE — 85018 HEMOGLOBIN: CPT | Performed by: INTERNAL MEDICINE

## 2019-01-01 PROCEDURE — 25000131 ZZH RX MED GY IP 250 OP 636 PS 637: Mod: GY | Performed by: HOSPITALIST

## 2019-01-01 PROCEDURE — A9270 NON-COVERED ITEM OR SERVICE: HCPCS | Mod: GY | Performed by: HOSPITALIST

## 2019-01-01 PROCEDURE — 80048 BASIC METABOLIC PNL TOTAL CA: CPT | Performed by: EMERGENCY MEDICINE

## 2019-01-01 PROCEDURE — 82272 OCCULT BLD FECES 1-3 TESTS: CPT | Performed by: EMERGENCY MEDICINE

## 2019-01-01 PROCEDURE — A9270 NON-COVERED ITEM OR SERVICE: HCPCS | Performed by: PHYSICIAN ASSISTANT

## 2019-01-01 PROCEDURE — 71250 CT THORAX DX C-: CPT

## 2019-01-01 PROCEDURE — 90750 HZV VACC RECOMBINANT IM: CPT | Performed by: INTERNAL MEDICINE

## 2019-01-01 PROCEDURE — 93320 DOPPLER ECHO COMPLETE: CPT | Mod: 26 | Performed by: INTERNAL MEDICINE

## 2019-01-01 PROCEDURE — G0378 HOSPITAL OBSERVATION PER HR: HCPCS

## 2019-01-01 PROCEDURE — 83540 ASSAY OF IRON: CPT | Performed by: HOSPITALIST

## 2019-01-01 PROCEDURE — 25000128 H RX IP 250 OP 636: Performed by: EMERGENCY MEDICINE

## 2019-01-01 PROCEDURE — 25800030 ZZH RX IP 258 OP 636: Performed by: HOSPITALIST

## 2019-01-01 PROCEDURE — 25800030 ZZH RX IP 258 OP 636: Performed by: PHYSICIAN ASSISTANT

## 2019-01-01 PROCEDURE — 40000141 ZZH STATISTIC PERIPHERAL IV START W/O US GUIDANCE

## 2019-01-01 PROCEDURE — 99498 ADVNCD CARE PLAN ADDL 30 MIN: CPT | Performed by: INTERNAL MEDICINE

## 2019-01-01 PROCEDURE — 85025 COMPLETE CBC W/AUTO DIFF WBC: CPT | Performed by: EMERGENCY MEDICINE

## 2019-01-01 PROCEDURE — 84484 ASSAY OF TROPONIN QUANT: CPT | Performed by: HOSPITALIST

## 2019-01-01 PROCEDURE — 93005 ELECTROCARDIOGRAM TRACING: CPT

## 2019-01-01 PROCEDURE — 99497 ADVNCD CARE PLAN 30 MIN: CPT | Performed by: INTERNAL MEDICINE

## 2019-01-01 PROCEDURE — 86901 BLOOD TYPING SEROLOGIC RH(D): CPT | Performed by: PHYSICIAN ASSISTANT

## 2019-01-01 PROCEDURE — 86900 BLOOD TYPING SEROLOGIC ABO: CPT | Performed by: HOSPITALIST

## 2019-01-01 PROCEDURE — 97161 PT EVAL LOW COMPLEX 20 MIN: CPT | Mod: GP | Performed by: PHYSICAL THERAPIST

## 2019-01-01 PROCEDURE — 70450 CT HEAD/BRAIN W/O DYE: CPT

## 2019-01-01 PROCEDURE — 25000132 ZZH RX MED GY IP 250 OP 250 PS 637: Mod: GY | Performed by: EMERGENCY MEDICINE

## 2019-01-01 PROCEDURE — 97110 THERAPEUTIC EXERCISES: CPT | Mod: GP

## 2019-01-01 PROCEDURE — 36430 TRANSFUSION BLD/BLD COMPNT: CPT

## 2019-01-01 PROCEDURE — 12000000 ZZH R&B MED SURG/OB

## 2019-01-01 PROCEDURE — 40000264 ECHOCARDIOGRAM COMPLETE

## 2019-01-01 PROCEDURE — 99207 ZZC CDG-HISTORY COMP: MEETS EXP. PROBLEM FOCUSED - DOWN CODED LACK OF HPI: CPT | Performed by: INTERNAL MEDICINE

## 2019-01-01 PROCEDURE — A9270 NON-COVERED ITEM OR SERVICE: HCPCS | Mod: GY | Performed by: NURSE PRACTITIONER

## 2019-01-01 PROCEDURE — 80053 COMPREHEN METABOLIC PANEL: CPT | Performed by: EMERGENCY MEDICINE

## 2019-01-01 PROCEDURE — 36415 COLL VENOUS BLD VENIPUNCTURE: CPT | Performed by: HOSPITALIST

## 2019-01-01 PROCEDURE — 99239 HOSP IP/OBS DSCHRG MGMT >30: CPT | Performed by: INTERNAL MEDICINE

## 2019-01-01 PROCEDURE — 85027 COMPLETE CBC AUTOMATED: CPT | Performed by: INTERNAL MEDICINE

## 2019-01-01 PROCEDURE — 86850 RBC ANTIBODY SCREEN: CPT | Performed by: PHYSICIAN ASSISTANT

## 2019-01-01 PROCEDURE — 99284 EMERGENCY DEPT VISIT MOD MDM: CPT

## 2019-01-01 PROCEDURE — 80048 BASIC METABOLIC PNL TOTAL CA: CPT | Performed by: INTERNAL MEDICINE

## 2019-01-01 PROCEDURE — 25000131 ZZH RX MED GY IP 250 OP 636 PS 637: Mod: GY | Performed by: INTERNAL MEDICINE

## 2019-01-01 PROCEDURE — 93321 DOPPLER ECHO F-UP/LMTD STD: CPT | Mod: 26 | Performed by: INTERNAL MEDICINE

## 2019-01-01 PROCEDURE — 85027 COMPLETE CBC AUTOMATED: CPT | Performed by: HOSPITALIST

## 2019-01-01 PROCEDURE — 99207 ZZC CDG-MDM COMPONENT: MEETS LOW - DOWN CODED: CPT | Performed by: INTERNAL MEDICINE

## 2019-01-01 PROCEDURE — 84132 ASSAY OF SERUM POTASSIUM: CPT | Performed by: INTERNAL MEDICINE

## 2019-01-01 PROCEDURE — 83735 ASSAY OF MAGNESIUM: CPT | Performed by: HOSPITALIST

## 2019-01-01 PROCEDURE — 97161 PT EVAL LOW COMPLEX 20 MIN: CPT | Mod: GP

## 2019-01-01 PROCEDURE — 99233 SBSQ HOSP IP/OBS HIGH 50: CPT | Performed by: HOSPITALIST

## 2019-01-01 PROCEDURE — 99214 OFFICE O/P EST MOD 30 MIN: CPT | Performed by: NURSE PRACTITIONER

## 2019-01-01 PROCEDURE — 37000009 ZZH ANESTHESIA TECHNICAL FEE, EACH ADDTL 15 MIN: Performed by: INTERNAL MEDICINE

## 2019-01-01 PROCEDURE — 99225 ZZC SUBSEQUENT OBSERVATION CARE,LEVEL II: CPT | Performed by: HOSPITALIST

## 2019-01-01 PROCEDURE — 25800030 ZZH RX IP 258 OP 636: Performed by: INTERNAL MEDICINE

## 2019-01-01 PROCEDURE — 82565 ASSAY OF CREATININE: CPT | Performed by: INTERNAL MEDICINE

## 2019-01-01 PROCEDURE — 85018 HEMOGLOBIN: CPT | Performed by: EMERGENCY MEDICINE

## 2019-01-01 PROCEDURE — 85027 COMPLETE CBC AUTOMATED: CPT | Performed by: PHYSICIAN ASSISTANT

## 2019-01-01 PROCEDURE — 83880 ASSAY OF NATRIURETIC PEPTIDE: CPT | Performed by: EMERGENCY MEDICINE

## 2019-01-01 PROCEDURE — 99223 1ST HOSP IP/OBS HIGH 75: CPT | Performed by: INTERNAL MEDICINE

## 2019-01-01 PROCEDURE — 25000125 ZZHC RX 250: Performed by: HOSPITALIST

## 2019-01-01 PROCEDURE — 99202 OFFICE O/P NEW SF 15 MIN: CPT | Performed by: INTERNAL MEDICINE

## 2019-01-01 PROCEDURE — 99207 ZZC DOWN CODE DUE TO SUBSEQUENT EXAM: CPT | Performed by: INTERNAL MEDICINE

## 2019-01-01 PROCEDURE — 96372 THER/PROPH/DIAG INJ SC/IM: CPT | Performed by: INTERNAL MEDICINE

## 2019-01-01 PROCEDURE — 97110 THERAPEUTIC EXERCISES: CPT | Mod: GP | Performed by: PHYSICAL THERAPIST

## 2019-01-01 PROCEDURE — 96372 THER/PROPH/DIAG INJ SC/IM: CPT

## 2019-01-01 PROCEDURE — 94640 AIRWAY INHALATION TREATMENT: CPT

## 2019-01-01 PROCEDURE — 36415 COLL VENOUS BLD VENIPUNCTURE: CPT | Performed by: PHYSICIAN ASSISTANT

## 2019-01-01 PROCEDURE — 83036 HEMOGLOBIN GLYCOSYLATED A1C: CPT | Performed by: INTERNAL MEDICINE

## 2019-01-01 PROCEDURE — 99316 NF DSCHRG MGMT 30 MIN+: CPT | Performed by: NURSE PRACTITIONER

## 2019-01-01 PROCEDURE — 85025 COMPLETE CBC W/AUTO DIFF WBC: CPT | Performed by: HOSPITALIST

## 2019-01-01 PROCEDURE — 99222 1ST HOSP IP/OBS MODERATE 55: CPT | Performed by: INTERNAL MEDICINE

## 2019-01-01 PROCEDURE — 40000235 ZZH STATISTIC TELEMETRY

## 2019-01-01 PROCEDURE — 37000008 ZZH ANESTHESIA TECHNICAL FEE, 1ST 30 MIN: Performed by: INTERNAL MEDICINE

## 2019-01-01 PROCEDURE — 40000857 ZZH STATISTIC TEE INCLUDES SEDATION

## 2019-01-01 PROCEDURE — 99215 OFFICE O/P EST HI 40 MIN: CPT | Performed by: INTERNAL MEDICINE

## 2019-01-01 PROCEDURE — 99305 1ST NF CARE MODERATE MDM 35: CPT | Performed by: INTERNAL MEDICINE

## 2019-01-01 PROCEDURE — 25000125 ZZHC RX 250: Performed by: INTERNAL MEDICINE

## 2019-01-01 PROCEDURE — 83605 ASSAY OF LACTIC ACID: CPT | Performed by: EMERGENCY MEDICINE

## 2019-01-01 PROCEDURE — 84145 PROCALCITONIN (PCT): CPT | Performed by: EMERGENCY MEDICINE

## 2019-01-01 PROCEDURE — 40000264 ECHOCARDIOGRAM LIMITED

## 2019-01-01 PROCEDURE — 76376 3D RENDER W/INTRP POSTPROCES: CPT

## 2019-01-01 PROCEDURE — 43235 EGD DIAGNOSTIC BRUSH WASH: CPT | Performed by: INTERNAL MEDICINE

## 2019-01-01 PROCEDURE — 25000128 H RX IP 250 OP 636: Performed by: NURSE ANESTHETIST, CERTIFIED REGISTERED

## 2019-01-01 PROCEDURE — 0DJ08ZZ INSPECTION OF UPPER INTESTINAL TRACT, VIA NATURAL OR ARTIFICIAL OPENING ENDOSCOPIC: ICD-10-PCS | Performed by: INTERNAL MEDICINE

## 2019-01-01 PROCEDURE — 99207 ZZC CDG-HISTORY COMP: MEETS EXP. PROBLEM FOCUSED-DOWN CODED LACK OF ROS: CPT | Performed by: INTERNAL MEDICINE

## 2019-01-01 PROCEDURE — 99309 SBSQ NF CARE MODERATE MDM 30: CPT | Performed by: NURSE PRACTITIONER

## 2019-01-01 PROCEDURE — 90471 IMMUNIZATION ADMIN: CPT | Performed by: INTERNAL MEDICINE

## 2019-01-01 PROCEDURE — 94799 UNLISTED PULMONARY SVC/PX: CPT

## 2019-01-01 PROCEDURE — 36415 COLL VENOUS BLD VENIPUNCTURE: CPT | Performed by: STUDENT IN AN ORGANIZED HEALTH CARE EDUCATION/TRAINING PROGRAM

## 2019-01-01 PROCEDURE — 25800030 ZZH RX IP 258 OP 636: Performed by: NURSE ANESTHETIST, CERTIFIED REGISTERED

## 2019-01-01 PROCEDURE — 83540 ASSAY OF IRON: CPT | Performed by: INTERNAL MEDICINE

## 2019-01-01 PROCEDURE — 87040 BLOOD CULTURE FOR BACTERIA: CPT | Performed by: EMERGENCY MEDICINE

## 2019-01-01 PROCEDURE — P9016 RBC LEUKOCYTES REDUCED: HCPCS | Performed by: HOSPITALIST

## 2019-01-01 PROCEDURE — 85027 COMPLETE CBC AUTOMATED: CPT | Performed by: NURSE PRACTITIONER

## 2019-01-01 PROCEDURE — 25500064 ZZH RX 255 OP 636: Performed by: HOSPITALIST

## 2019-01-01 PROCEDURE — 0DJD8ZZ INSPECTION OF LOWER INTESTINAL TRACT, VIA NATURAL OR ARTIFICIAL OPENING ENDOSCOPIC: ICD-10-PCS | Performed by: INTERNAL MEDICINE

## 2019-01-01 PROCEDURE — 85025 COMPLETE CBC W/AUTO DIFF WBC: CPT | Performed by: INTERNAL MEDICINE

## 2019-01-01 PROCEDURE — 83605 ASSAY OF LACTIC ACID: CPT

## 2019-01-01 PROCEDURE — 99225 ZZC SUBSEQUENT OBSERVATION CARE,LEVEL II: CPT | Performed by: INTERNAL MEDICINE

## 2019-01-01 PROCEDURE — 72190 X-RAY EXAM OF PELVIS: CPT

## 2019-01-01 PROCEDURE — 97166 OT EVAL MOD COMPLEX 45 MIN: CPT | Mod: GO

## 2019-01-01 PROCEDURE — 86900 BLOOD TYPING SEROLOGIC ABO: CPT | Performed by: EMERGENCY MEDICINE

## 2019-01-01 PROCEDURE — 86923 COMPATIBILITY TEST ELECTRIC: CPT | Performed by: HOSPITALIST

## 2019-01-01 PROCEDURE — 99207 ZZC MOONLIGHTING INDICATOR: CPT | Performed by: INTERNAL MEDICINE

## 2019-01-01 PROCEDURE — 86850 RBC ANTIBODY SCREEN: CPT | Performed by: HOSPITALIST

## 2019-01-01 PROCEDURE — 85610 PROTHROMBIN TIME: CPT | Performed by: PHYSICIAN ASSISTANT

## 2019-01-01 PROCEDURE — 86901 BLOOD TYPING SEROLOGIC RH(D): CPT | Performed by: EMERGENCY MEDICINE

## 2019-01-01 PROCEDURE — 76770 US EXAM ABDO BACK WALL COMP: CPT

## 2019-01-01 PROCEDURE — 25000128 H RX IP 250 OP 636: Performed by: PHYSICIAN ASSISTANT

## 2019-01-01 PROCEDURE — 85730 THROMBOPLASTIN TIME PARTIAL: CPT | Performed by: PHYSICIAN ASSISTANT

## 2019-01-01 PROCEDURE — 45378 DIAGNOSTIC COLONOSCOPY: CPT | Performed by: INTERNAL MEDICINE

## 2019-01-01 PROCEDURE — 81001 URINALYSIS AUTO W/SCOPE: CPT | Performed by: EMERGENCY MEDICINE

## 2019-01-01 PROCEDURE — 82043 UR ALBUMIN QUANTITATIVE: CPT | Performed by: INTERNAL MEDICINE

## 2019-01-01 PROCEDURE — 36415 COLL VENOUS BLD VENIPUNCTURE: CPT | Performed by: EMERGENCY MEDICINE

## 2019-01-01 PROCEDURE — 99232 SBSQ HOSP IP/OBS MODERATE 35: CPT | Performed by: HOSPITALIST

## 2019-01-01 PROCEDURE — 80048 BASIC METABOLIC PNL TOTAL CA: CPT | Performed by: NURSE PRACTITIONER

## 2019-01-01 PROCEDURE — 97535 SELF CARE MNGMENT TRAINING: CPT | Mod: GO

## 2019-01-01 PROCEDURE — 97116 GAIT TRAINING THERAPY: CPT | Mod: GP | Performed by: PHYSICAL THERAPIST

## 2019-01-01 PROCEDURE — 81001 URINALYSIS AUTO W/SCOPE: CPT | Performed by: INTERNAL MEDICINE

## 2019-01-01 PROCEDURE — 84484 ASSAY OF TROPONIN QUANT: CPT | Performed by: EMERGENCY MEDICINE

## 2019-01-01 PROCEDURE — 86850 RBC ANTIBODY SCREEN: CPT | Performed by: EMERGENCY MEDICINE

## 2019-01-01 PROCEDURE — 93325 DOPPLER ECHO COLOR FLOW MAPG: CPT | Mod: 26 | Performed by: INTERNAL MEDICINE

## 2019-01-01 PROCEDURE — 25000132 ZZH RX MED GY IP 250 OP 250 PS 637: Mod: GY | Performed by: NURSE PRACTITIONER

## 2019-01-01 PROCEDURE — 99231 SBSQ HOSP IP/OBS SF/LOW 25: CPT | Performed by: INTERNAL MEDICINE

## 2019-01-01 PROCEDURE — 40000010 ZZH STATISTIC ANES STAT CODE-CRNA PER MINUTE: Performed by: INTERNAL MEDICINE

## 2019-01-01 PROCEDURE — 80306 DRUG TEST PRSMV INSTRMNT: CPT | Performed by: INTERNAL MEDICINE

## 2019-01-01 PROCEDURE — P9016 RBC LEUKOCYTES REDUCED: HCPCS | Performed by: EMERGENCY MEDICINE

## 2019-01-01 PROCEDURE — 25000125 ZZHC RX 250: Performed by: NURSE ANESTHETIST, CERTIFIED REGISTERED

## 2019-01-01 PROCEDURE — A9270 NON-COVERED ITEM OR SERVICE: HCPCS | Mod: GY | Performed by: EMERGENCY MEDICINE

## 2019-01-01 PROCEDURE — 99232 SBSQ HOSP IP/OBS MODERATE 35: CPT | Performed by: STUDENT IN AN ORGANIZED HEALTH CARE EDUCATION/TRAINING PROGRAM

## 2019-01-01 PROCEDURE — 80053 COMPREHEN METABOLIC PANEL: CPT | Performed by: HOSPITALIST

## 2019-01-01 PROCEDURE — 97535 SELF CARE MNGMENT TRAINING: CPT | Mod: GO | Performed by: OCCUPATIONAL THERAPIST

## 2019-01-01 PROCEDURE — 96367 TX/PROPH/DG ADDL SEQ IV INF: CPT

## 2019-01-01 PROCEDURE — 93312 ECHO TRANSESOPHAGEAL: CPT | Mod: 26 | Performed by: INTERNAL MEDICINE

## 2019-01-01 PROCEDURE — 99310 SBSQ NF CARE HIGH MDM 45: CPT | Performed by: NURSE PRACTITIONER

## 2019-01-01 PROCEDURE — 93308 TTE F-UP OR LMTD: CPT | Mod: 26 | Performed by: INTERNAL MEDICINE

## 2019-01-01 PROCEDURE — 96365 THER/PROPH/DIAG IV INF INIT: CPT

## 2019-01-01 PROCEDURE — 99232 SBSQ HOSP IP/OBS MODERATE 35: CPT | Mod: 25 | Performed by: INTERNAL MEDICINE

## 2019-01-01 PROCEDURE — 99207 ZZC CDG-CODE CATEGORY CHANGED: CPT | Performed by: HOSPITALIST

## 2019-01-01 PROCEDURE — 99285 EMERGENCY DEPT VISIT HI MDM: CPT

## 2019-01-01 PROCEDURE — 86900 BLOOD TYPING SEROLOGIC ABO: CPT | Performed by: PHYSICIAN ASSISTANT

## 2019-01-01 PROCEDURE — 84132 ASSAY OF SERUM POTASSIUM: CPT | Performed by: HOSPITALIST

## 2019-01-01 PROCEDURE — 99238 HOSP IP/OBS DSCHRG MGMT 30/<: CPT | Performed by: NURSE PRACTITIONER

## 2019-01-01 PROCEDURE — 86923 COMPATIBILITY TEST ELECTRIC: CPT | Performed by: EMERGENCY MEDICINE

## 2019-01-01 PROCEDURE — 80061 LIPID PANEL: CPT | Performed by: INTERNAL MEDICINE

## 2019-01-01 PROCEDURE — 86901 BLOOD TYPING SEROLOGIC RH(D): CPT | Performed by: HOSPITALIST

## 2019-01-01 RX ORDER — FLUMAZENIL 0.1 MG/ML
0.2 INJECTION, SOLUTION INTRAVENOUS
Status: DISCONTINUED | OUTPATIENT
Start: 2019-01-01 | End: 2019-01-01 | Stop reason: HOSPADM

## 2019-01-01 RX ORDER — SODIUM CHLORIDE AND POTASSIUM CHLORIDE 150; 450 MG/100ML; MG/100ML
INJECTION, SOLUTION INTRAVENOUS CONTINUOUS
Status: DISCONTINUED | OUTPATIENT
Start: 2019-01-01 | End: 2019-01-01

## 2019-01-01 RX ORDER — METOPROLOL TARTRATE 25 MG/1
TABLET, FILM COATED ORAL
Qty: 90 TABLET | Refills: 1 | Status: SHIPPED | OUTPATIENT
Start: 2019-01-01

## 2019-01-01 RX ORDER — TORSEMIDE 20 MG/1
20 TABLET ORAL DAILY
Status: DISCONTINUED | OUTPATIENT
Start: 2019-01-01 | End: 2019-01-01

## 2019-01-01 RX ORDER — TORSEMIDE 20 MG/1
20 TABLET ORAL 3 TIMES DAILY
COMMUNITY
Start: 2019-01-01 | End: 2019-01-01 | Stop reason: DRUGHIGH

## 2019-01-01 RX ORDER — ALBUTEROL SULFATE 90 UG/1
2 AEROSOL, METERED RESPIRATORY (INHALATION) EVERY 4 HOURS PRN
Status: DISCONTINUED | OUTPATIENT
Start: 2019-01-01 | End: 2019-01-01 | Stop reason: HOSPADM

## 2019-01-01 RX ORDER — TRAMADOL HYDROCHLORIDE 50 MG/1
50 TABLET ORAL EVERY 6 HOURS PRN
Status: DISCONTINUED | OUTPATIENT
Start: 2019-01-01 | End: 2019-01-01 | Stop reason: HOSPADM

## 2019-01-01 RX ORDER — AZITHROMYCIN 250 MG/1
250 TABLET, FILM COATED ORAL DAILY
Status: COMPLETED | OUTPATIENT
Start: 2019-01-01 | End: 2019-01-01

## 2019-01-01 RX ORDER — ACETAMINOPHEN 650 MG/1
650 SUPPOSITORY RECTAL EVERY 4 HOURS PRN
Status: DISCONTINUED | OUTPATIENT
Start: 2019-01-01 | End: 2019-01-01 | Stop reason: HOSPADM

## 2019-01-01 RX ORDER — DOXYCYCLINE 100 MG/1
100 CAPSULE ORAL 2 TIMES DAILY
Qty: 14 CAPSULE | Refills: 0 | Status: SHIPPED | OUTPATIENT
Start: 2019-01-01 | End: 2019-01-01

## 2019-01-01 RX ORDER — MINERAL OIL/HYDROPHIL PETROLAT
OINTMENT (GRAM) TOPICAL EVERY 8 HOURS PRN
Status: DISCONTINUED | OUTPATIENT
Start: 2019-01-01 | End: 2019-01-01 | Stop reason: HOSPADM

## 2019-01-01 RX ORDER — ONDANSETRON 4 MG/1
4 TABLET, ORALLY DISINTEGRATING ORAL EVERY 30 MIN PRN
Status: CANCELLED | OUTPATIENT
Start: 2019-01-01

## 2019-01-01 RX ORDER — FLUTICASONE PROPIONATE 50 MCG
2 SPRAY, SUSPENSION (ML) NASAL DAILY
Qty: 15.8 ML | Refills: 11 | Status: SHIPPED | OUTPATIENT
Start: 2019-01-01

## 2019-01-01 RX ORDER — FUROSEMIDE 10 MG/ML
40 INJECTION INTRAMUSCULAR; INTRAVENOUS 2 TIMES DAILY
Status: DISCONTINUED | OUTPATIENT
Start: 2019-01-01 | End: 2019-01-01

## 2019-01-01 RX ORDER — LIDOCAINE 40 MG/G
CREAM TOPICAL
Status: DISCONTINUED | OUTPATIENT
Start: 2019-01-01 | End: 2019-01-01 | Stop reason: HOSPADM

## 2019-01-01 RX ORDER — TRIAMCINOLONE ACETONIDE 1 MG/G
CREAM TOPICAL 2 TIMES DAILY PRN
COMMUNITY

## 2019-01-01 RX ORDER — SODIUM CHLORIDE, SODIUM LACTATE, POTASSIUM CHLORIDE, CALCIUM CHLORIDE 600; 310; 30; 20 MG/100ML; MG/100ML; MG/100ML; MG/100ML
INJECTION, SOLUTION INTRAVENOUS CONTINUOUS PRN
Status: DISCONTINUED | OUTPATIENT
Start: 2019-01-01 | End: 2019-01-01

## 2019-01-01 RX ORDER — TIOTROPIUM BROMIDE 18 UG/1
18 CAPSULE ORAL; RESPIRATORY (INHALATION) DAILY
COMMUNITY
End: 2019-01-01

## 2019-01-01 RX ORDER — OXYCODONE HYDROCHLORIDE 5 MG/1
5 TABLET ORAL EVERY 4 HOURS PRN
COMMUNITY
End: 2019-01-01

## 2019-01-01 RX ORDER — SODIUM CHLORIDE 9 MG/ML
INJECTION, SOLUTION INTRAVENOUS CONTINUOUS PRN
Status: DISCONTINUED | OUTPATIENT
Start: 2019-01-01 | End: 2019-01-01 | Stop reason: HOSPADM

## 2019-01-01 RX ORDER — POTASSIUM CHLORIDE 29.8 MG/ML
20 INJECTION INTRAVENOUS
Status: DISCONTINUED | OUTPATIENT
Start: 2019-01-01 | End: 2019-01-01

## 2019-01-01 RX ORDER — HYDROXYZINE PAMOATE 25 MG/1
25 CAPSULE ORAL 2 TIMES DAILY PRN
Status: DISCONTINUED | OUTPATIENT
Start: 2019-01-01 | End: 2019-01-01 | Stop reason: HOSPADM

## 2019-01-01 RX ORDER — SODIUM CHLORIDE 9 MG/ML
1000 INJECTION, SOLUTION INTRAVENOUS CONTINUOUS
Status: DISCONTINUED | OUTPATIENT
Start: 2019-01-01 | End: 2019-01-01 | Stop reason: HOSPADM

## 2019-01-01 RX ORDER — SPIRONOLACTONE 25 MG/1
25-50 TABLET ORAL DAILY
Qty: 60 TABLET | Refills: 3 | Status: SHIPPED | OUTPATIENT
Start: 2019-01-01 | End: 2019-01-01 | Stop reason: DRUGHIGH

## 2019-01-01 RX ORDER — LANOLIN ALCOHOL/MO/W.PET/CERES
3 CREAM (GRAM) TOPICAL
Status: DISCONTINUED | OUTPATIENT
Start: 2019-01-01 | End: 2019-01-01 | Stop reason: HOSPADM

## 2019-01-01 RX ORDER — LEVOFLOXACIN 5 MG/ML
500 INJECTION, SOLUTION INTRAVENOUS EVERY 24 HOURS
Status: DISCONTINUED | OUTPATIENT
Start: 2019-01-01 | End: 2019-01-01

## 2019-01-01 RX ORDER — POTASSIUM CHLORIDE 1.5 G/1.58G
20 POWDER, FOR SOLUTION ORAL ONCE
Status: COMPLETED | OUTPATIENT
Start: 2019-01-01 | End: 2019-01-01

## 2019-01-01 RX ORDER — DEXTROSE MONOHYDRATE 25 G/50ML
25-50 INJECTION, SOLUTION INTRAVENOUS
Status: DISCONTINUED | OUTPATIENT
Start: 2019-01-01 | End: 2019-01-01 | Stop reason: HOSPADM

## 2019-01-01 RX ORDER — IPRATROPIUM BROMIDE AND ALBUTEROL SULFATE 2.5; .5 MG/3ML; MG/3ML
3 SOLUTION RESPIRATORY (INHALATION) EVERY 4 HOURS PRN
Status: DISCONTINUED | OUTPATIENT
Start: 2019-01-01 | End: 2019-01-01 | Stop reason: HOSPADM

## 2019-01-01 RX ORDER — FENTANYL CITRATE 50 UG/ML
25-50 INJECTION, SOLUTION INTRAMUSCULAR; INTRAVENOUS
Status: CANCELLED | OUTPATIENT
Start: 2019-01-01

## 2019-01-01 RX ORDER — METOPROLOL TARTRATE 25 MG/1
25 TABLET, FILM COATED ORAL 2 TIMES DAILY
Status: DISCONTINUED | OUTPATIENT
Start: 2019-01-01 | End: 2019-01-01

## 2019-01-01 RX ORDER — ONDANSETRON 4 MG/1
4 TABLET, ORALLY DISINTEGRATING ORAL EVERY 6 HOURS PRN
Status: DISCONTINUED | OUTPATIENT
Start: 2019-01-01 | End: 2019-01-01 | Stop reason: HOSPADM

## 2019-01-01 RX ORDER — CEFAZOLIN SODIUM 2 G/100ML
2 INJECTION, SOLUTION INTRAVENOUS
Status: DISCONTINUED | OUTPATIENT
Start: 2019-01-01 | End: 2019-01-01

## 2019-01-01 RX ORDER — TRAMADOL HYDROCHLORIDE 50 MG/1
50 TABLET ORAL EVERY 6 HOURS PRN
Qty: 10 TABLET | Refills: 0 | Status: ON HOLD | OUTPATIENT
Start: 2019-01-01 | End: 2019-01-01

## 2019-01-01 RX ORDER — POLYETHYLENE GLYCOL 3350 17 G/17G
17 POWDER, FOR SOLUTION ORAL DAILY PRN
Status: DISCONTINUED | OUTPATIENT
Start: 2019-01-01 | End: 2019-01-01 | Stop reason: HOSPADM

## 2019-01-01 RX ORDER — FERROUS SULFATE 325(65) MG
325 TABLET ORAL 2 TIMES DAILY WITH MEALS
Status: DISCONTINUED | OUTPATIENT
Start: 2019-01-01 | End: 2019-01-01

## 2019-01-01 RX ORDER — GUAIFENESIN 600 MG/1
600 TABLET, EXTENDED RELEASE ORAL 2 TIMES DAILY
DISCHARGE
Start: 2019-01-01 | End: 2019-01-01

## 2019-01-01 RX ORDER — HYDROMORPHONE HYDROCHLORIDE 5 MG/5ML
1-2 SOLUTION ORAL
Status: DISCONTINUED | OUTPATIENT
Start: 2019-01-01 | End: 2019-01-01

## 2019-01-01 RX ORDER — ONDANSETRON 2 MG/ML
4 INJECTION INTRAMUSCULAR; INTRAVENOUS EVERY 6 HOURS PRN
Status: DISCONTINUED | OUTPATIENT
Start: 2019-01-01 | End: 2019-01-01 | Stop reason: HOSPADM

## 2019-01-01 RX ORDER — TORSEMIDE 20 MG/1
20 TABLET ORAL DAILY
Qty: 30 TABLET | Refills: 1 | OUTPATIENT
Start: 2019-01-01

## 2019-01-01 RX ORDER — TORSEMIDE 20 MG/1
40 TABLET ORAL DAILY
Status: DISCONTINUED | OUTPATIENT
Start: 2019-01-01 | End: 2019-01-01

## 2019-01-01 RX ORDER — POTASSIUM CHLORIDE 1500 MG/1
20-40 TABLET, EXTENDED RELEASE ORAL
Status: DISCONTINUED | OUTPATIENT
Start: 2019-01-01 | End: 2019-01-01 | Stop reason: HOSPADM

## 2019-01-01 RX ORDER — ACETAMINOPHEN 500 MG
500 TABLET ORAL ONCE
Status: COMPLETED | OUTPATIENT
Start: 2019-01-01 | End: 2019-01-01

## 2019-01-01 RX ORDER — AZITHROMYCIN 500 MG/1
500 INJECTION, POWDER, LYOPHILIZED, FOR SOLUTION INTRAVENOUS ONCE
Status: COMPLETED | OUTPATIENT
Start: 2019-01-01 | End: 2019-01-01

## 2019-01-01 RX ORDER — ERYTHROMYCIN 5 MG/G
0.5 OINTMENT OPHTHALMIC 3 TIMES DAILY
Qty: 1 TUBE | Refills: 0 | Status: SHIPPED | OUTPATIENT
Start: 2019-01-01

## 2019-01-01 RX ORDER — TRAMADOL HYDROCHLORIDE 50 MG/1
50 TABLET ORAL EVERY 6 HOURS PRN
Qty: 12 TABLET | Status: SHIPPED | DISCHARGE
Start: 2019-01-01

## 2019-01-01 RX ORDER — AMOXICILLIN 250 MG
1 CAPSULE ORAL 2 TIMES DAILY PRN
Status: DISCONTINUED | OUTPATIENT
Start: 2019-01-01 | End: 2019-01-01 | Stop reason: HOSPADM

## 2019-01-01 RX ORDER — TRAMADOL HYDROCHLORIDE 50 MG/1
50 TABLET ORAL EVERY 6 HOURS PRN
Status: ON HOLD | COMMUNITY
End: 2019-01-01

## 2019-01-01 RX ORDER — ONDANSETRON 2 MG/ML
4 INJECTION INTRAMUSCULAR; INTRAVENOUS EVERY 30 MIN PRN
Status: CANCELLED | OUTPATIENT
Start: 2019-01-01

## 2019-01-01 RX ORDER — DOXYCYCLINE HYCLATE 50 MG/1
50 CAPSULE ORAL EVERY 12 HOURS SCHEDULED
Status: DISCONTINUED | OUTPATIENT
Start: 2019-01-01 | End: 2019-01-01

## 2019-01-01 RX ORDER — ACETAMINOPHEN 325 MG/1
650 TABLET ORAL EVERY 4 HOURS PRN
Status: DISCONTINUED | OUTPATIENT
Start: 2019-01-01 | End: 2019-01-01 | Stop reason: HOSPADM

## 2019-01-01 RX ORDER — PREGABALIN 75 MG/1
75 CAPSULE ORAL 2 TIMES DAILY
Status: DISCONTINUED | OUTPATIENT
Start: 2019-01-01 | End: 2019-01-01 | Stop reason: HOSPADM

## 2019-01-01 RX ORDER — FUROSEMIDE 10 MG/ML
80 INJECTION INTRAMUSCULAR; INTRAVENOUS ONCE
Status: COMPLETED | OUTPATIENT
Start: 2019-01-01 | End: 2019-01-01

## 2019-01-01 RX ORDER — FUROSEMIDE 10 MG/ML
60 INJECTION INTRAMUSCULAR; INTRAVENOUS
Status: DISCONTINUED | OUTPATIENT
Start: 2019-01-01 | End: 2019-01-01

## 2019-01-01 RX ORDER — TORSEMIDE 20 MG/1
20 TABLET ORAL ONCE
Status: COMPLETED | OUTPATIENT
Start: 2019-01-01 | End: 2019-01-01

## 2019-01-01 RX ORDER — NALOXONE HYDROCHLORIDE 0.4 MG/ML
.1-.4 INJECTION, SOLUTION INTRAMUSCULAR; INTRAVENOUS; SUBCUTANEOUS
Status: DISCONTINUED | OUTPATIENT
Start: 2019-01-01 | End: 2019-01-01 | Stop reason: HOSPADM

## 2019-01-01 RX ORDER — AMOXICILLIN 250 MG
2 CAPSULE ORAL 2 TIMES DAILY PRN
Status: DISCONTINUED | OUTPATIENT
Start: 2019-01-01 | End: 2019-01-01 | Stop reason: HOSPADM

## 2019-01-01 RX ORDER — FUROSEMIDE 10 MG/ML
40 INJECTION INTRAMUSCULAR; INTRAVENOUS
Status: DISCONTINUED | OUTPATIENT
Start: 2019-01-01 | End: 2019-01-01

## 2019-01-01 RX ORDER — IPRATROPIUM BROMIDE AND ALBUTEROL SULFATE 2.5; .5 MG/3ML; MG/3ML
3 SOLUTION RESPIRATORY (INHALATION)
Status: DISCONTINUED | OUTPATIENT
Start: 2019-01-01 | End: 2019-01-01

## 2019-01-01 RX ORDER — BISACODYL 10 MG
10 SUPPOSITORY, RECTAL RECTAL DAILY PRN
Status: DISCONTINUED | OUTPATIENT
Start: 2019-01-01 | End: 2019-01-01 | Stop reason: HOSPADM

## 2019-01-01 RX ORDER — LIDOCAINE HYDROCHLORIDE 20 MG/ML
INJECTION, SOLUTION INFILTRATION; PERINEURAL PRN
Status: DISCONTINUED | OUTPATIENT
Start: 2019-01-01 | End: 2019-01-01

## 2019-01-01 RX ORDER — TIOTROPIUM BROMIDE 18 UG/1
18 CAPSULE ORAL; RESPIRATORY (INHALATION) EVERY EVENING
Qty: 30 CAPSULE | Refills: 0 | Status: SHIPPED | OUTPATIENT
Start: 2019-01-01 | End: 2019-01-01

## 2019-01-01 RX ORDER — TORSEMIDE 20 MG/1
20 TABLET ORAL DAILY
Status: DISCONTINUED | OUTPATIENT
Start: 2019-01-01 | End: 2019-01-01 | Stop reason: HOSPADM

## 2019-01-01 RX ORDER — TIOTROPIUM BROMIDE 18 UG/1
18 CAPSULE ORAL; RESPIRATORY (INHALATION) DAILY
Status: DISCONTINUED | OUTPATIENT
Start: 2019-01-01 | End: 2019-01-01

## 2019-01-01 RX ORDER — FENTANYL CITRATE 50 UG/ML
50-100 INJECTION, SOLUTION INTRAMUSCULAR; INTRAVENOUS
Status: DISCONTINUED | OUTPATIENT
Start: 2019-01-01 | End: 2019-01-01 | Stop reason: HOSPADM

## 2019-01-01 RX ORDER — NALOXONE HYDROCHLORIDE 0.4 MG/ML
.1-.4 INJECTION, SOLUTION INTRAMUSCULAR; INTRAVENOUS; SUBCUTANEOUS
Status: CANCELLED | OUTPATIENT
Start: 2019-01-01 | End: 2019-01-01

## 2019-01-01 RX ORDER — POTASSIUM CHLORIDE 1500 MG/1
20 TABLET, EXTENDED RELEASE ORAL
Status: DISCONTINUED | OUTPATIENT
Start: 2019-01-01 | End: 2019-01-01 | Stop reason: HOSPADM

## 2019-01-01 RX ORDER — PREGABALIN 75 MG
CAPSULE ORAL
Qty: 60 CAPSULE | Refills: 1 | Status: ON HOLD | OUTPATIENT
Start: 2019-01-01 | End: 2019-01-01

## 2019-01-01 RX ORDER — POTASSIUM CHLORIDE 1500 MG/1
20 TABLET, EXTENDED RELEASE ORAL ONCE
Status: COMPLETED | OUTPATIENT
Start: 2019-01-01 | End: 2019-01-01

## 2019-01-01 RX ORDER — SPIRONOLACTONE 25 MG/1
25-50 TABLET ORAL DAILY
Status: DISCONTINUED | OUTPATIENT
Start: 2019-01-01 | End: 2019-01-01

## 2019-01-01 RX ORDER — SALIVA STIMULANT COMB. NO.3
1 SPRAY, NON-AEROSOL (ML) MUCOUS MEMBRANE
Status: DISCONTINUED | OUTPATIENT
Start: 2019-01-01 | End: 2019-01-01 | Stop reason: HOSPADM

## 2019-01-01 RX ORDER — METOPROLOL TARTRATE 25 MG/1
25 TABLET, FILM COATED ORAL 2 TIMES DAILY
Status: DISCONTINUED | OUTPATIENT
Start: 2019-01-01 | End: 2019-01-01 | Stop reason: HOSPADM

## 2019-01-01 RX ORDER — POLYETHYLENE GLYCOL 3350 17 G/17G
17 POWDER, FOR SOLUTION ORAL DAILY
Status: DISCONTINUED | OUTPATIENT
Start: 2019-01-01 | End: 2019-01-01 | Stop reason: HOSPADM

## 2019-01-01 RX ORDER — CLINDAMYCIN PHOSPHATE 900 MG/50ML
900 INJECTION, SOLUTION INTRAVENOUS ONCE
Status: DISCONTINUED | OUTPATIENT
Start: 2019-01-01 | End: 2019-01-01

## 2019-01-01 RX ORDER — OXYCODONE HYDROCHLORIDE 5 MG/1
5 TABLET ORAL ONCE
Status: COMPLETED | OUTPATIENT
Start: 2019-01-01 | End: 2019-01-01

## 2019-01-01 RX ORDER — IPRATROPIUM BROMIDE AND ALBUTEROL SULFATE 2.5; .5 MG/3ML; MG/3ML
1 SOLUTION RESPIRATORY (INHALATION) 4 TIMES DAILY
COMMUNITY
Start: 2018-01-01 | End: 2019-01-01

## 2019-01-01 RX ORDER — GLIPIZIDE 5 MG/1
5 TABLET ORAL
Status: DISCONTINUED | OUTPATIENT
Start: 2019-01-01 | End: 2019-01-01

## 2019-01-01 RX ORDER — LEVOFLOXACIN 5 MG/ML
750 INJECTION, SOLUTION INTRAVENOUS ONCE
Status: COMPLETED | OUTPATIENT
Start: 2019-01-01 | End: 2019-01-01

## 2019-01-01 RX ORDER — PREGABALIN 75 MG/1
75 CAPSULE ORAL 2 TIMES DAILY
Status: DISCONTINUED | OUTPATIENT
Start: 2019-01-01 | End: 2019-01-01

## 2019-01-01 RX ORDER — BENZOCAINE/MENTHOL 6 MG-10 MG
LOZENGE MUCOUS MEMBRANE 3 TIMES DAILY PRN
Status: ON HOLD | COMMUNITY
End: 2019-01-01

## 2019-01-01 RX ORDER — FLUTICASONE PROPIONATE 50 MCG
2 SPRAY, SUSPENSION (ML) NASAL EVERY EVENING
Status: DISCONTINUED | OUTPATIENT
Start: 2019-01-01 | End: 2019-01-01

## 2019-01-01 RX ORDER — SPIRONOLACTONE 25 MG/1
25 TABLET ORAL DAILY
Status: DISCONTINUED | OUTPATIENT
Start: 2019-01-01 | End: 2019-01-01 | Stop reason: HOSPADM

## 2019-01-01 RX ORDER — PROPOFOL 10 MG/ML
INJECTION, EMULSION INTRAVENOUS CONTINUOUS PRN
Status: DISCONTINUED | OUTPATIENT
Start: 2019-01-01 | End: 2019-01-01

## 2019-01-01 RX ORDER — GUAIFENESIN 600 MG/1
600 TABLET, EXTENDED RELEASE ORAL 2 TIMES DAILY
Status: DISCONTINUED | OUTPATIENT
Start: 2019-01-01 | End: 2019-01-01 | Stop reason: HOSPADM

## 2019-01-01 RX ORDER — FUROSEMIDE 10 MG/ML
20 INJECTION INTRAMUSCULAR; INTRAVENOUS ONCE
Status: COMPLETED | OUTPATIENT
Start: 2019-01-01 | End: 2019-01-01

## 2019-01-01 RX ORDER — SIMVASTATIN 10 MG
10 TABLET ORAL AT BEDTIME
Status: DISCONTINUED | OUTPATIENT
Start: 2019-01-01 | End: 2019-01-01

## 2019-01-01 RX ORDER — MINOCYCLINE HYDROCHLORIDE 100 MG/1
CAPSULE ORAL
COMMUNITY
End: 2019-01-01

## 2019-01-01 RX ORDER — SIMVASTATIN 10 MG
10 TABLET ORAL AT BEDTIME
Status: DISCONTINUED | OUTPATIENT
Start: 2019-01-01 | End: 2019-01-01 | Stop reason: HOSPADM

## 2019-01-01 RX ORDER — BISACODYL 5 MG
10 TABLET, DELAYED RELEASE (ENTERIC COATED) ORAL DAILY PRN
Status: DISCONTINUED | OUTPATIENT
Start: 2019-01-01 | End: 2019-01-01 | Stop reason: HOSPADM

## 2019-01-01 RX ORDER — ONDANSETRON 4 MG/1
4 TABLET, ORALLY DISINTEGRATING ORAL EVERY 6 HOURS PRN
Status: DISCONTINUED | OUTPATIENT
Start: 2019-01-01 | End: 2019-01-01

## 2019-01-01 RX ORDER — MAGNESIUM SULFATE HEPTAHYDRATE 40 MG/ML
4 INJECTION, SOLUTION INTRAVENOUS EVERY 4 HOURS PRN
Status: DISCONTINUED | OUTPATIENT
Start: 2019-01-01 | End: 2019-01-01 | Stop reason: HOSPADM

## 2019-01-01 RX ORDER — AMOXICILLIN 250 MG
2 CAPSULE ORAL 2 TIMES DAILY
Status: DISCONTINUED | OUTPATIENT
Start: 2019-01-01 | End: 2019-01-01 | Stop reason: HOSPADM

## 2019-01-01 RX ORDER — POTASSIUM CHLORIDE 1.5 G/1.58G
20 POWDER, FOR SOLUTION ORAL DAILY
Status: CANCELLED | OUTPATIENT
Start: 2019-01-01

## 2019-01-01 RX ORDER — TIOTROPIUM BROMIDE 18 UG/1
18 CAPSULE ORAL; RESPIRATORY (INHALATION) DAILY
Status: DISCONTINUED | OUTPATIENT
Start: 2019-01-01 | End: 2019-01-01 | Stop reason: CLARIF

## 2019-01-01 RX ORDER — BISACODYL 5 MG/1
10 TABLET, DELAYED RELEASE ORAL AT BEDTIME
COMMUNITY

## 2019-01-01 RX ORDER — BUMETANIDE 0.25 MG/ML
1 INJECTION INTRAMUSCULAR; INTRAVENOUS
Status: COMPLETED | OUTPATIENT
Start: 2019-01-01 | End: 2019-01-01

## 2019-01-01 RX ORDER — SPIRONOLACTONE 25 MG/1
25 TABLET ORAL DAILY
Qty: 30 TABLET | Refills: 1 | Status: SHIPPED | OUTPATIENT
Start: 2019-01-01 | End: 2019-01-01

## 2019-01-01 RX ORDER — BUMETANIDE 0.25 MG/ML
1 INJECTION INTRAMUSCULAR; INTRAVENOUS EVERY 8 HOURS
Status: COMPLETED | OUTPATIENT
Start: 2019-01-01 | End: 2019-01-01

## 2019-01-01 RX ORDER — GLYCOPYRROLATE 0.2 MG/ML
0.1 INJECTION, SOLUTION INTRAMUSCULAR; INTRAVENOUS ONCE
Status: COMPLETED | OUTPATIENT
Start: 2019-01-01 | End: 2019-01-01

## 2019-01-01 RX ORDER — HYDROXYZINE PAMOATE 25 MG/1
25 CAPSULE ORAL 2 TIMES DAILY PRN
Qty: 180 CAPSULE | Refills: 1 | Status: SHIPPED | OUTPATIENT
Start: 2019-01-01

## 2019-01-01 RX ORDER — LEVOFLOXACIN 5 MG/ML
750 INJECTION, SOLUTION INTRAVENOUS
Status: DISCONTINUED | OUTPATIENT
Start: 2019-01-01 | End: 2019-01-01

## 2019-01-01 RX ORDER — TIOTROPIUM BROMIDE 18 UG/1
CAPSULE ORAL; RESPIRATORY (INHALATION)
Refills: 1 | OUTPATIENT
Start: 2019-01-01

## 2019-01-01 RX ORDER — FUROSEMIDE 10 MG/ML
40 INJECTION INTRAMUSCULAR; INTRAVENOUS EVERY 12 HOURS
Status: DISCONTINUED | OUTPATIENT
Start: 2019-01-01 | End: 2019-01-01

## 2019-01-01 RX ORDER — HYDROXYZINE HYDROCHLORIDE 25 MG/1
25 TABLET, FILM COATED ORAL 2 TIMES DAILY PRN
Status: DISCONTINUED | OUTPATIENT
Start: 2019-01-01 | End: 2019-01-01 | Stop reason: HOSPADM

## 2019-01-01 RX ORDER — SPIRONOLACTONE 25 MG/1
25 TABLET ORAL DAILY
COMMUNITY
End: 2019-01-01

## 2019-01-01 RX ORDER — FENTANYL CITRATE 50 UG/ML
25 INJECTION, SOLUTION INTRAMUSCULAR; INTRAVENOUS
Status: DISCONTINUED | OUTPATIENT
Start: 2019-01-01 | End: 2019-01-01 | Stop reason: HOSPADM

## 2019-01-01 RX ORDER — ATROPINE SULFATE 10 MG/ML
1-2 SOLUTION/ DROPS OPHTHALMIC
Status: DISCONTINUED | OUTPATIENT
Start: 2019-01-01 | End: 2019-01-01 | Stop reason: HOSPADM

## 2019-01-01 RX ORDER — TRIAMCINOLONE ACETONIDE 1 MG/G
CREAM TOPICAL 2 TIMES DAILY
Qty: 60 G | Refills: 0 | Status: ON HOLD | OUTPATIENT
Start: 2019-01-01 | End: 2019-01-01

## 2019-01-01 RX ORDER — LIDOCAINE 40 MG/G
CREAM TOPICAL
Status: DISCONTINUED | OUTPATIENT
Start: 2019-01-01 | End: 2019-01-01

## 2019-01-01 RX ORDER — TIOTROPIUM BROMIDE 18 UG/1
18 CAPSULE ORAL; RESPIRATORY (INHALATION) DAILY
Qty: 90 CAPSULE | Refills: 1 | Status: SHIPPED | OUTPATIENT
Start: 2019-01-01

## 2019-01-01 RX ORDER — HYDROMORPHONE HYDROCHLORIDE 1 MG/ML
.3-.5 INJECTION, SOLUTION INTRAMUSCULAR; INTRAVENOUS; SUBCUTANEOUS EVERY 5 MIN PRN
Status: CANCELLED | OUTPATIENT
Start: 2019-01-01

## 2019-01-01 RX ORDER — PREGABALIN 75 MG/1
75 CAPSULE ORAL 2 TIMES DAILY
Qty: 10 CAPSULE | Refills: 1 | Status: SHIPPED | DISCHARGE
Start: 2019-01-01

## 2019-01-01 RX ORDER — TRAMADOL HYDROCHLORIDE 50 MG/1
50 TABLET ORAL EVERY 6 HOURS PRN
Qty: 10 TABLET | Refills: 0 | COMMUNITY
Start: 2019-01-01 | End: 2019-01-01

## 2019-01-01 RX ORDER — AMOXICILLIN 500 MG/1
TABLET, FILM COATED ORAL
COMMUNITY
End: 2019-01-01

## 2019-01-01 RX ORDER — TIOTROPIUM BROMIDE 18 UG/1
CAPSULE ORAL; RESPIRATORY (INHALATION)
Refills: 0
Start: 2019-01-01

## 2019-01-01 RX ORDER — MICONAZOLE NITRATE 20 MG/G
CREAM TOPICAL
Status: DISCONTINUED | OUTPATIENT
Start: 2019-01-01 | End: 2019-01-01 | Stop reason: HOSPADM

## 2019-01-01 RX ORDER — LIDOCAINE HYDROCHLORIDE 40 MG/ML
1.5 SOLUTION TOPICAL ONCE
Status: COMPLETED | OUTPATIENT
Start: 2019-01-01 | End: 2019-01-01

## 2019-01-01 RX ORDER — TRAMADOL HYDROCHLORIDE 50 MG/1
50 TABLET ORAL ONCE
Status: COMPLETED | OUTPATIENT
Start: 2019-01-01 | End: 2019-01-01

## 2019-01-01 RX ORDER — LEVOFLOXACIN 750 MG/1
750 TABLET, FILM COATED ORAL EVERY OTHER DAY
DISCHARGE
Start: 2019-01-01 | End: 2019-01-01

## 2019-01-01 RX ORDER — FENTANYL CITRATE 50 UG/ML
25-50 INJECTION, SOLUTION INTRAMUSCULAR; INTRAVENOUS
Status: COMPLETED | OUTPATIENT
Start: 2019-01-01 | End: 2019-01-01

## 2019-01-01 RX ORDER — SODIUM CHLORIDE, SODIUM LACTATE, POTASSIUM CHLORIDE, CALCIUM CHLORIDE 600; 310; 30; 20 MG/100ML; MG/100ML; MG/100ML; MG/100ML
INJECTION, SOLUTION INTRAVENOUS CONTINUOUS
Status: CANCELLED | OUTPATIENT
Start: 2019-01-01

## 2019-01-01 RX ORDER — LIDOCAINE 50 MG/G
1 PATCH TOPICAL DAILY PRN
COMMUNITY
Start: 2018-01-01 | End: 2019-01-01

## 2019-01-01 RX ORDER — FUROSEMIDE 20 MG
TABLET ORAL
Qty: 180 TABLET | Refills: 1 | OUTPATIENT
Start: 2019-01-01

## 2019-01-01 RX ORDER — FLUTICASONE PROPIONATE 50 MCG
2 SPRAY, SUSPENSION (ML) NASAL EVERY EVENING
Status: DISCONTINUED | OUTPATIENT
Start: 2019-01-01 | End: 2019-01-01 | Stop reason: HOSPADM

## 2019-01-01 RX ORDER — POTASSIUM CHLORIDE 1.5 G/1.58G
20-40 POWDER, FOR SOLUTION ORAL
Status: DISCONTINUED | OUTPATIENT
Start: 2019-01-01 | End: 2019-01-01 | Stop reason: HOSPADM

## 2019-01-01 RX ORDER — NICOTINE POLACRILEX 4 MG
15-30 LOZENGE BUCCAL
Status: DISCONTINUED | OUTPATIENT
Start: 2019-01-01 | End: 2019-01-01 | Stop reason: HOSPADM

## 2019-01-01 RX ORDER — HYDROMORPHONE HYDROCHLORIDE 5 MG/5ML
1-2 SOLUTION ORAL
Status: DISCONTINUED | OUTPATIENT
Start: 2019-01-01 | End: 2019-01-01 | Stop reason: HOSPADM

## 2019-01-01 RX ORDER — POTASSIUM CL/LIDO/0.9 % NACL 10MEQ/0.1L
10 INTRAVENOUS SOLUTION, PIGGYBACK (ML) INTRAVENOUS
Status: DISCONTINUED | OUTPATIENT
Start: 2019-01-01 | End: 2019-01-01 | Stop reason: HOSPADM

## 2019-01-01 RX ORDER — POTASSIUM CHLORIDE 7.45 MG/ML
10 INJECTION INTRAVENOUS
Status: DISCONTINUED | OUTPATIENT
Start: 2019-01-01 | End: 2019-01-01 | Stop reason: HOSPADM

## 2019-01-01 RX ORDER — TORSEMIDE 20 MG/1
20 TABLET ORAL 2 TIMES DAILY
Qty: 90 TABLET | Refills: 1
Start: 2019-01-01

## 2019-01-01 RX ORDER — SODIUM CHLORIDE, SODIUM LACTATE, POTASSIUM CHLORIDE, CALCIUM CHLORIDE 600; 310; 30; 20 MG/100ML; MG/100ML; MG/100ML; MG/100ML
INJECTION, SOLUTION INTRAVENOUS CONTINUOUS
Status: DISCONTINUED | OUTPATIENT
Start: 2019-01-01 | End: 2019-01-01

## 2019-01-01 RX ORDER — ALBUTEROL SULFATE 0.83 MG/ML
3 SOLUTION RESPIRATORY (INHALATION)
Status: DISCONTINUED | OUTPATIENT
Start: 2019-01-01 | End: 2019-01-01 | Stop reason: HOSPADM

## 2019-01-01 RX ORDER — ONDANSETRON 2 MG/ML
INJECTION INTRAMUSCULAR; INTRAVENOUS PRN
Status: DISCONTINUED | OUTPATIENT
Start: 2019-01-01 | End: 2019-01-01

## 2019-01-01 RX ADMIN — UMECLIDINIUM 1 PUFF: 62.5 AEROSOL, POWDER ORAL at 08:04

## 2019-01-01 RX ADMIN — IPRATROPIUM BROMIDE AND ALBUTEROL SULFATE 3 ML: .5; 3 SOLUTION RESPIRATORY (INHALATION) at 15:19

## 2019-01-01 RX ADMIN — METOPROLOL TARTRATE 25 MG: 25 TABLET, FILM COATED ORAL at 21:45

## 2019-01-01 RX ADMIN — GUAIFENESIN 600 MG: 600 TABLET, EXTENDED RELEASE ORAL at 15:20

## 2019-01-01 RX ADMIN — TRAMADOL HYDROCHLORIDE 50 MG: 50 TABLET, COATED ORAL at 22:08

## 2019-01-01 RX ADMIN — SODIUM CHLORIDE 500 ML: 9 INJECTION, SOLUTION INTRAVENOUS at 05:16

## 2019-01-01 RX ADMIN — METOPROLOL TARTRATE 25 MG: 25 TABLET ORAL at 09:45

## 2019-01-01 RX ADMIN — SENNOSIDES AND DOCUSATE SODIUM 2 TABLET: 8.6; 5 TABLET ORAL at 09:51

## 2019-01-01 RX ADMIN — BUMETANIDE 1 MG: 0.25 INJECTION INTRAMUSCULAR; INTRAVENOUS at 16:40

## 2019-01-01 RX ADMIN — TRAMADOL HYDROCHLORIDE 50 MG: 50 TABLET, COATED ORAL at 11:47

## 2019-01-01 RX ADMIN — METOPROLOL TARTRATE 25 MG: 25 TABLET ORAL at 08:00

## 2019-01-01 RX ADMIN — PREGABALIN 75 MG: 75 CAPSULE ORAL at 21:46

## 2019-01-01 RX ADMIN — TORSEMIDE 20 MG: 20 TABLET ORAL at 08:47

## 2019-01-01 RX ADMIN — SENNOSIDES AND DOCUSATE SODIUM 2 TABLET: 8.6; 5 TABLET ORAL at 09:08

## 2019-01-01 RX ADMIN — FLUTICASONE PROPIONATE 2 SPRAY: 50 SPRAY, METERED NASAL at 19:57

## 2019-01-01 RX ADMIN — PREGABALIN 75 MG: 75 CAPSULE ORAL at 08:01

## 2019-01-01 RX ADMIN — Medication 10 MG: at 20:51

## 2019-01-01 RX ADMIN — TRAMADOL HYDROCHLORIDE 50 MG: 50 TABLET, COATED ORAL at 00:03

## 2019-01-01 RX ADMIN — SIMVASTATIN 10 MG: 10 TABLET, FILM COATED ORAL at 21:03

## 2019-01-01 RX ADMIN — INSULIN ASPART 1 UNITS: 100 INJECTION, SOLUTION INTRAVENOUS; SUBCUTANEOUS at 13:04

## 2019-01-01 RX ADMIN — FUROSEMIDE 40 MG: 10 INJECTION, SOLUTION INTRAVENOUS at 18:03

## 2019-01-01 RX ADMIN — ACETAMINOPHEN 650 MG: 325 TABLET, FILM COATED ORAL at 00:18

## 2019-01-01 RX ADMIN — PREGABALIN 75 MG: 75 CAPSULE ORAL at 10:09

## 2019-01-01 RX ADMIN — IPRATROPIUM BROMIDE AND ALBUTEROL SULFATE 3 ML: .5; 3 SOLUTION RESPIRATORY (INHALATION) at 07:10

## 2019-01-01 RX ADMIN — HUMAN ALBUMIN MICROSPHERES AND PERFLUTREN 9 ML: 10; .22 INJECTION, SOLUTION INTRAVENOUS at 12:35

## 2019-01-01 RX ADMIN — RANITIDINE 150 MG: 150 TABLET ORAL at 21:08

## 2019-01-01 RX ADMIN — MELATONIN TAB 3 MG 3 MG: 3 TAB at 22:35

## 2019-01-01 RX ADMIN — POLYETHYLENE GLYCOL 3350 17 G: 17 POWDER, FOR SOLUTION ORAL at 09:08

## 2019-01-01 RX ADMIN — AZITHROMYCIN 250 MG: 250 TABLET, FILM COATED ORAL at 08:53

## 2019-01-01 RX ADMIN — BUMETANIDE 1 MG: 0.25 INJECTION INTRAMUSCULAR; INTRAVENOUS at 00:29

## 2019-01-01 RX ADMIN — SODIUM CHLORIDE 1000 ML: 9 INJECTION, SOLUTION INTRAVENOUS at 09:15

## 2019-01-01 RX ADMIN — TRAMADOL HYDROCHLORIDE 50 MG: 50 TABLET, COATED ORAL at 21:44

## 2019-01-01 RX ADMIN — UMECLIDINIUM 1 PUFF: 62.5 AEROSOL, POWDER ORAL at 08:18

## 2019-01-01 RX ADMIN — RANITIDINE 75 MG: 75 TABLET, COATED ORAL at 08:01

## 2019-01-01 RX ADMIN — BISACODYL 10 MG: 5 TABLET, COATED ORAL at 11:02

## 2019-01-01 RX ADMIN — SIMVASTATIN 10 MG: 10 TABLET, FILM COATED ORAL at 21:08

## 2019-01-01 RX ADMIN — INSULIN ASPART 2 UNITS: 100 INJECTION, SOLUTION INTRAVENOUS; SUBCUTANEOUS at 16:52

## 2019-01-01 RX ADMIN — TRAMADOL HYDROCHLORIDE 50 MG: 50 TABLET, COATED ORAL at 22:17

## 2019-01-01 RX ADMIN — UMECLIDINIUM 1 PUFF: 62.5 AEROSOL, POWDER ORAL at 09:08

## 2019-01-01 RX ADMIN — METOPROLOL TARTRATE 25 MG: 25 TABLET, FILM COATED ORAL at 10:15

## 2019-01-01 RX ADMIN — PREGABALIN 75 MG: 75 CAPSULE ORAL at 20:00

## 2019-01-01 RX ADMIN — DOXYCYCLINE HYCLATE 50 MG: 50 CAPSULE ORAL at 17:51

## 2019-01-01 RX ADMIN — SODIUM CHLORIDE 1000 ML: 9 INJECTION, SOLUTION INTRAVENOUS at 06:54

## 2019-01-01 RX ADMIN — INSULIN ASPART 2 UNITS: 100 INJECTION, SOLUTION INTRAVENOUS; SUBCUTANEOUS at 17:17

## 2019-01-01 RX ADMIN — PREGABALIN 75 MG: 75 CAPSULE ORAL at 21:13

## 2019-01-01 RX ADMIN — GUAIFENESIN 600 MG: 600 TABLET, EXTENDED RELEASE ORAL at 10:15

## 2019-01-01 RX ADMIN — MAGNESIUM HYDROXIDE 30 ML: 400 SUSPENSION ORAL at 18:50

## 2019-01-01 RX ADMIN — BISACODYL 10 MG: 5 TABLET, COATED ORAL at 08:32

## 2019-01-01 RX ADMIN — SIMVASTATIN 10 MG: 10 TABLET, FILM COATED ORAL at 21:23

## 2019-01-01 RX ADMIN — SENNOSIDES AND DOCUSATE SODIUM 2 TABLET: 8.6; 5 TABLET ORAL at 08:17

## 2019-01-01 RX ADMIN — FLUTICASONE PROPIONATE 2 SPRAY: 50 SPRAY, METERED NASAL at 21:04

## 2019-01-01 RX ADMIN — SENNOSIDES AND DOCUSATE SODIUM 2 TABLET: 8.6; 5 TABLET ORAL at 08:32

## 2019-01-01 RX ADMIN — FLUTICASONE PROPIONATE 2 SPRAY: 50 SPRAY, METERED NASAL at 21:00

## 2019-01-01 RX ADMIN — TRAMADOL HYDROCHLORIDE 50 MG: 50 TABLET, COATED ORAL at 01:09

## 2019-01-01 RX ADMIN — IPRATROPIUM BROMIDE AND ALBUTEROL SULFATE 3 ML: .5; 3 SOLUTION RESPIRATORY (INHALATION) at 11:17

## 2019-01-01 RX ADMIN — INSULIN ASPART 2 UNITS: 100 INJECTION, SOLUTION INTRAVENOUS; SUBCUTANEOUS at 18:34

## 2019-01-01 RX ADMIN — SENNOSIDES AND DOCUSATE SODIUM 2 TABLET: 8.6; 5 TABLET ORAL at 08:00

## 2019-01-01 RX ADMIN — PREGABALIN 75 MG: 75 CAPSULE ORAL at 21:40

## 2019-01-01 RX ADMIN — ACETAMINOPHEN 650 MG: 325 TABLET, FILM COATED ORAL at 20:32

## 2019-01-01 RX ADMIN — SIMVASTATIN 10 MG: 10 TABLET, FILM COATED ORAL at 21:45

## 2019-01-01 RX ADMIN — TORSEMIDE 40 MG: 20 TABLET ORAL at 10:47

## 2019-01-01 RX ADMIN — SODIUM CHLORIDE, POTASSIUM CHLORIDE, SODIUM LACTATE AND CALCIUM CHLORIDE: 600; 310; 30; 20 INJECTION, SOLUTION INTRAVENOUS at 07:01

## 2019-01-01 RX ADMIN — MELATONIN TAB 3 MG 3 MG: 3 TAB at 23:00

## 2019-01-01 RX ADMIN — METOPROLOL TARTRATE 25 MG: 25 TABLET, FILM COATED ORAL at 09:42

## 2019-01-01 RX ADMIN — ONDANSETRON 4 MG: 2 INJECTION INTRAMUSCULAR; INTRAVENOUS at 20:04

## 2019-01-01 RX ADMIN — SIMVASTATIN 10 MG: 10 TABLET, FILM COATED ORAL at 21:27

## 2019-01-01 RX ADMIN — INSULIN ASPART 1 UNITS: 100 INJECTION, SOLUTION INTRAVENOUS; SUBCUTANEOUS at 17:22

## 2019-01-01 RX ADMIN — INSULIN ASPART 1 UNITS: 100 INJECTION, SOLUTION INTRAVENOUS; SUBCUTANEOUS at 12:01

## 2019-01-01 RX ADMIN — INSULIN ASPART 1 UNITS: 100 INJECTION, SOLUTION INTRAVENOUS; SUBCUTANEOUS at 16:41

## 2019-01-01 RX ADMIN — LEVOFLOXACIN 750 MG: 5 INJECTION, SOLUTION INTRAVENOUS at 04:10

## 2019-01-01 RX ADMIN — PREGABALIN 75 MG: 75 CAPSULE ORAL at 08:54

## 2019-01-01 RX ADMIN — MICONAZOLE NITRATE: 20 CREAM TOPICAL at 08:37

## 2019-01-01 RX ADMIN — BUMETANIDE 1 MG: 0.25 INJECTION INTRAMUSCULAR; INTRAVENOUS at 08:18

## 2019-01-01 RX ADMIN — INSULIN ASPART 1 UNITS: 100 INJECTION, SOLUTION INTRAVENOUS; SUBCUTANEOUS at 13:32

## 2019-01-01 RX ADMIN — RANITIDINE 75 MG: 75 TABLET, COATED ORAL at 08:47

## 2019-01-01 RX ADMIN — PREGABALIN 75 MG: 75 CAPSULE ORAL at 09:03

## 2019-01-01 RX ADMIN — INSULIN ASPART 1 UNITS: 100 INJECTION, SOLUTION INTRAVENOUS; SUBCUTANEOUS at 07:15

## 2019-01-01 RX ADMIN — PREGABALIN 75 MG: 75 CAPSULE ORAL at 21:11

## 2019-01-01 RX ADMIN — CEFEPIME 2 G: 2 INJECTION, POWDER, FOR SOLUTION INTRAVENOUS at 03:31

## 2019-01-01 RX ADMIN — ACETAMINOPHEN 650 MG: 325 TABLET, FILM COATED ORAL at 00:03

## 2019-01-01 RX ADMIN — METOPROLOL TARTRATE 25 MG: 25 TABLET, FILM COATED ORAL at 21:05

## 2019-01-01 RX ADMIN — PREGABALIN 75 MG: 75 CAPSULE ORAL at 20:37

## 2019-01-01 RX ADMIN — PHENYLEPHRINE HYDROCHLORIDE 200 MCG: 10 INJECTION, SOLUTION INTRAMUSCULAR; INTRAVENOUS; SUBCUTANEOUS at 12:15

## 2019-01-01 RX ADMIN — ACETAMINOPHEN 650 MG: 325 TABLET, FILM COATED ORAL at 08:52

## 2019-01-01 RX ADMIN — SPIRONOLACTONE 25 MG: 25 TABLET ORAL at 08:31

## 2019-01-01 RX ADMIN — FUROSEMIDE 60 MG: 10 INJECTION, SOLUTION INTRAVENOUS at 10:13

## 2019-01-01 RX ADMIN — METOPROLOL TARTRATE 25 MG: 25 TABLET ORAL at 08:32

## 2019-01-01 RX ADMIN — Medication 1 MG: at 00:20

## 2019-01-01 RX ADMIN — TRAMADOL HYDROCHLORIDE 50 MG: 50 TABLET, COATED ORAL at 16:55

## 2019-01-01 RX ADMIN — METOPROLOL TARTRATE 25 MG: 25 TABLET ORAL at 20:01

## 2019-01-01 RX ADMIN — RANITIDINE 75 MG: 75 TABLET, COATED ORAL at 08:00

## 2019-01-01 RX ADMIN — PREGABALIN 75 MG: 75 CAPSULE ORAL at 21:05

## 2019-01-01 RX ADMIN — IPRATROPIUM BROMIDE AND ALBUTEROL SULFATE 3 ML: .5; 3 SOLUTION RESPIRATORY (INHALATION) at 07:16

## 2019-01-01 RX ADMIN — HYDROXYZINE PAMOATE 25 MG: 25 CAPSULE ORAL at 08:18

## 2019-01-01 RX ADMIN — PREGABALIN 75 MG: 75 CAPSULE ORAL at 20:52

## 2019-01-01 RX ADMIN — OXYCODONE HYDROCHLORIDE 5 MG: 5 TABLET ORAL at 00:36

## 2019-01-01 RX ADMIN — MICONAZOLE NITRATE: 20 CREAM TOPICAL at 10:09

## 2019-01-01 RX ADMIN — SENNOSIDES AND DOCUSATE SODIUM 2 TABLET: 8.6; 5 TABLET ORAL at 21:45

## 2019-01-01 RX ADMIN — PREGABALIN 75 MG: 75 CAPSULE ORAL at 20:30

## 2019-01-01 RX ADMIN — SODIUM CHLORIDE, POTASSIUM CHLORIDE, SODIUM LACTATE AND CALCIUM CHLORIDE: 600; 310; 30; 20 INJECTION, SOLUTION INTRAVENOUS at 11:37

## 2019-01-01 RX ADMIN — SIMVASTATIN 10 MG: 10 TABLET, FILM COATED ORAL at 21:11

## 2019-01-01 RX ADMIN — RANITIDINE 150 MG: 150 TABLET ORAL at 21:56

## 2019-01-01 RX ADMIN — BISACODYL 10 MG: 5 TABLET, COATED ORAL at 12:31

## 2019-01-01 RX ADMIN — METOPROLOL TARTRATE 25 MG: 25 TABLET ORAL at 08:01

## 2019-01-01 RX ADMIN — FUROSEMIDE 20 MG: 10 INJECTION, SOLUTION INTRAVENOUS at 19:09

## 2019-01-01 RX ADMIN — Medication 1 MG: at 20:56

## 2019-01-01 RX ADMIN — APIXABAN 2.5 MG: 2.5 TABLET, FILM COATED ORAL at 08:00

## 2019-01-01 RX ADMIN — PHENYLEPHRINE HYDROCHLORIDE 100 MCG: 10 INJECTION, SOLUTION INTRAMUSCULAR; INTRAVENOUS; SUBCUTANEOUS at 11:58

## 2019-01-01 RX ADMIN — PREGABALIN 75 MG: 75 CAPSULE ORAL at 20:01

## 2019-01-01 RX ADMIN — SODIUM CHLORIDE: 9 INJECTION, SOLUTION INTRAVENOUS at 13:23

## 2019-01-01 RX ADMIN — TORSEMIDE 20 MG: 20 TABLET ORAL at 09:45

## 2019-01-01 RX ADMIN — APIXABAN 2.5 MG: 2.5 TABLET, FILM COATED ORAL at 08:32

## 2019-01-01 RX ADMIN — Medication 10 MG: at 15:34

## 2019-01-01 RX ADMIN — SIMVASTATIN 10 MG: 10 TABLET, FILM COATED ORAL at 21:05

## 2019-01-01 RX ADMIN — PREGABALIN 75 MG: 75 CAPSULE ORAL at 08:00

## 2019-01-01 RX ADMIN — POLYETHYLENE GLYCOL 3350 17 G: 17 POWDER, FOR SOLUTION ORAL at 09:51

## 2019-01-01 RX ADMIN — BUMETANIDE 1 MG: 0.25 INJECTION INTRAMUSCULAR; INTRAVENOUS at 16:07

## 2019-01-01 RX ADMIN — APIXABAN 2.5 MG: 2.5 TABLET, FILM COATED ORAL at 10:47

## 2019-01-01 RX ADMIN — FENTANYL CITRATE 25 MCG: 50 INJECTION, SOLUTION INTRAMUSCULAR; INTRAVENOUS at 13:46

## 2019-01-01 RX ADMIN — PREGABALIN 75 MG: 75 CAPSULE ORAL at 22:01

## 2019-01-01 RX ADMIN — FLUTICASONE PROPIONATE 2 SPRAY: 50 SPRAY, METERED NASAL at 20:30

## 2019-01-01 RX ADMIN — SPIRONOLACTONE 25 MG: 25 TABLET ORAL at 09:10

## 2019-01-01 RX ADMIN — ACETAMINOPHEN 650 MG: 325 TABLET, FILM COATED ORAL at 17:50

## 2019-01-01 RX ADMIN — METOPROLOL TARTRATE 25 MG: 25 TABLET, FILM COATED ORAL at 21:08

## 2019-01-01 RX ADMIN — RANITIDINE 75 MG: 75 TABLET, COATED ORAL at 21:03

## 2019-01-01 RX ADMIN — RANITIDINE 75 MG: 75 TABLET, COATED ORAL at 20:53

## 2019-01-01 RX ADMIN — RANITIDINE 75 MG: 75 TABLET, COATED ORAL at 21:13

## 2019-01-01 RX ADMIN — UMECLIDINIUM 1 PUFF: 62.5 AEROSOL, POWDER ORAL at 09:48

## 2019-01-01 RX ADMIN — METOPROLOL TARTRATE 25 MG: 25 TABLET ORAL at 12:00

## 2019-01-01 RX ADMIN — SIMVASTATIN 10 MG: 10 TABLET, FILM COATED ORAL at 21:36

## 2019-01-01 RX ADMIN — ACETAMINOPHEN 650 MG: 325 TABLET, FILM COATED ORAL at 09:22

## 2019-01-01 RX ADMIN — SENNOSIDES AND DOCUSATE SODIUM 2 TABLET: 8.6; 5 TABLET ORAL at 20:53

## 2019-01-01 RX ADMIN — SIMVASTATIN 10 MG: 10 TABLET, FILM COATED ORAL at 21:56

## 2019-01-01 RX ADMIN — INSULIN ASPART 1 UNITS: 100 INJECTION, SOLUTION INTRAVENOUS; SUBCUTANEOUS at 17:50

## 2019-01-01 RX ADMIN — SPIRONOLACTONE 25 MG: 25 TABLET ORAL at 09:42

## 2019-01-01 RX ADMIN — SPIRONOLACTONE 25 MG: 25 TABLET ORAL at 13:15

## 2019-01-01 RX ADMIN — PROPOFOL 50 MCG/KG/MIN: 10 INJECTION, EMULSION INTRAVENOUS at 11:44

## 2019-01-01 RX ADMIN — METOPROLOL TARTRATE 25 MG: 25 TABLET ORAL at 08:18

## 2019-01-01 RX ADMIN — RANITIDINE 75 MG: 75 TABLET, COATED ORAL at 21:11

## 2019-01-01 RX ADMIN — POLYETHYLENE GLYCOL 3350 17 G: 17 POWDER, FOR SOLUTION ORAL at 08:32

## 2019-01-01 RX ADMIN — RANITIDINE 75 MG: 75 TABLET, COATED ORAL at 08:17

## 2019-01-01 RX ADMIN — RANITIDINE 75 MG: 75 TABLET, COATED ORAL at 22:01

## 2019-01-01 RX ADMIN — SENNOSIDES AND DOCUSATE SODIUM 2 TABLET: 8.6; 5 TABLET ORAL at 22:01

## 2019-01-01 RX ADMIN — METOPROLOL TARTRATE 25 MG: 25 TABLET, FILM COATED ORAL at 08:21

## 2019-01-01 RX ADMIN — ACETAMINOPHEN 650 MG: 325 TABLET, FILM COATED ORAL at 13:39

## 2019-01-01 RX ADMIN — INSULIN ASPART 1 UNITS: 100 INJECTION, SOLUTION INTRAVENOUS; SUBCUTANEOUS at 14:17

## 2019-01-01 RX ADMIN — APIXABAN 2.5 MG: 2.5 TABLET, FILM COATED ORAL at 08:18

## 2019-01-01 RX ADMIN — SIMVASTATIN 10 MG: 10 TABLET, FILM COATED ORAL at 20:52

## 2019-01-01 RX ADMIN — DEXTRAN 70, AND HYPROMELLOSE 2910 2 DROP: 1; 3 SOLUTION/ DROPS OPHTHALMIC at 17:03

## 2019-01-01 RX ADMIN — HUMAN ALBUMIN MICROSPHERES AND PERFLUTREN 3 ML: 10; .22 INJECTION, SOLUTION INTRAVENOUS at 10:00

## 2019-01-01 RX ADMIN — PHENYLEPHRINE HYDROCHLORIDE 100 MCG: 10 INJECTION, SOLUTION INTRAMUSCULAR; INTRAVENOUS; SUBCUTANEOUS at 12:09

## 2019-01-01 RX ADMIN — METOPROLOL TARTRATE 25 MG: 25 TABLET ORAL at 21:06

## 2019-01-01 RX ADMIN — PREGABALIN 75 MG: 75 CAPSULE ORAL at 08:32

## 2019-01-01 RX ADMIN — LIDOCAINE HYDROCHLORIDE 40 MG: 20 INJECTION, SOLUTION INFILTRATION; PERINEURAL at 11:38

## 2019-01-01 RX ADMIN — INSULIN ASPART 1 UNITS: 100 INJECTION, SOLUTION INTRAVENOUS; SUBCUTANEOUS at 17:43

## 2019-01-01 RX ADMIN — METOPROLOL TARTRATE 25 MG: 25 TABLET ORAL at 20:37

## 2019-01-01 RX ADMIN — TRAMADOL HYDROCHLORIDE 50 MG: 50 TABLET, COATED ORAL at 08:06

## 2019-01-01 RX ADMIN — FUROSEMIDE 40 MG: 10 INJECTION, SOLUTION INTRAVENOUS at 15:08

## 2019-01-01 RX ADMIN — OLANZAPINE 2.5 MG: 5 TABLET, ORALLY DISINTEGRATING ORAL at 17:01

## 2019-01-01 RX ADMIN — METOPROLOL TARTRATE 25 MG: 25 TABLET, FILM COATED ORAL at 21:37

## 2019-01-01 RX ADMIN — METOPROLOL TARTRATE 25 MG: 25 TABLET, FILM COATED ORAL at 09:10

## 2019-01-01 RX ADMIN — MAGNESIUM HYDROXIDE 30 ML: 400 SUSPENSION ORAL at 14:20

## 2019-01-01 RX ADMIN — HYDROMORPHONE HYDROCHLORIDE 2 MG: 5 SOLUTION ORAL at 09:11

## 2019-01-01 RX ADMIN — METOPROLOL TARTRATE 25 MG: 25 TABLET ORAL at 20:30

## 2019-01-01 RX ADMIN — METOPROLOL TARTRATE 25 MG: 25 TABLET, FILM COATED ORAL at 08:32

## 2019-01-01 RX ADMIN — Medication 10 MG: at 21:05

## 2019-01-01 RX ADMIN — LIDOCAINE HYDROCHLORIDE 0.3 ML: 10 INJECTION, SOLUTION EPIDURAL; INFILTRATION; INTRACAUDAL; PERINEURAL at 09:15

## 2019-01-01 RX ADMIN — RANITIDINE 75 MG: 75 TABLET, COATED ORAL at 09:08

## 2019-01-01 RX ADMIN — FERROUS SULFATE TAB 325 MG (65 MG ELEMENTAL FE) 325 MG: 325 (65 FE) TAB at 08:03

## 2019-01-01 RX ADMIN — PREGABALIN 75 MG: 75 CAPSULE ORAL at 08:27

## 2019-01-01 RX ADMIN — IRON SUCROSE 300 MG: 20 INJECTION, SOLUTION INTRAVENOUS at 09:19

## 2019-01-01 RX ADMIN — SODIUM CHLORIDE, POTASSIUM CHLORIDE, SODIUM LACTATE AND CALCIUM CHLORIDE: 600; 310; 30; 20 INJECTION, SOLUTION INTRAVENOUS at 22:42

## 2019-01-01 RX ADMIN — RANITIDINE 150 MG: 150 TABLET ORAL at 20:52

## 2019-01-01 RX ADMIN — IPRATROPIUM BROMIDE AND ALBUTEROL SULFATE 3 ML: .5; 3 SOLUTION RESPIRATORY (INHALATION) at 07:42

## 2019-01-01 RX ADMIN — PHENYLEPHRINE HYDROCHLORIDE 100 MCG: 10 INJECTION, SOLUTION INTRAMUSCULAR; INTRAVENOUS; SUBCUTANEOUS at 11:51

## 2019-01-01 RX ADMIN — RANITIDINE 150 MG: 150 TABLET ORAL at 22:32

## 2019-01-01 RX ADMIN — DEXTROSE MONOHYDRATE 25 ML: 500 INJECTION PARENTERAL at 08:39

## 2019-01-01 RX ADMIN — METOPROLOL TARTRATE 25 MG: 25 TABLET ORAL at 21:21

## 2019-01-01 RX ADMIN — MELATONIN TAB 3 MG 3 MG: 3 TAB at 21:40

## 2019-01-01 RX ADMIN — UMECLIDINIUM 1 PUFF: 62.5 AEROSOL, POWDER ORAL at 08:32

## 2019-01-01 RX ADMIN — ACETAMINOPHEN 650 MG: 325 TABLET, FILM COATED ORAL at 20:00

## 2019-01-01 RX ADMIN — FERROUS SULFATE TAB 325 MG (65 MG ELEMENTAL FE) 325 MG: 325 (65 FE) TAB at 17:48

## 2019-01-01 RX ADMIN — LEVOFLOXACIN 750 MG: 500 TABLET, FILM COATED ORAL at 11:53

## 2019-01-01 RX ADMIN — IPRATROPIUM BROMIDE AND ALBUTEROL SULFATE 3 ML: .5; 3 SOLUTION RESPIRATORY (INHALATION) at 15:49

## 2019-01-01 RX ADMIN — PREGABALIN 75 MG: 75 CAPSULE ORAL at 10:15

## 2019-01-01 RX ADMIN — HYDROMORPHONE HYDROCHLORIDE 2 MG: 5 SOLUTION ORAL at 04:27

## 2019-01-01 RX ADMIN — LIDOCAINE HYDROCHLORIDE 1.5 ML: 40 SOLUTION TOPICAL at 13:17

## 2019-01-01 RX ADMIN — PHENYLEPHRINE HYDROCHLORIDE 100 MCG: 10 INJECTION, SOLUTION INTRAMUSCULAR; INTRAVENOUS; SUBCUTANEOUS at 11:49

## 2019-01-01 RX ADMIN — FLUTICASONE PROPIONATE 2 SPRAY: 50 SPRAY, METERED NASAL at 20:57

## 2019-01-01 RX ADMIN — IPRATROPIUM BROMIDE AND ALBUTEROL SULFATE 3 ML: .5; 3 SOLUTION RESPIRATORY (INHALATION) at 12:11

## 2019-01-01 RX ADMIN — HYDROMORPHONE HYDROCHLORIDE 2 MG: 5 SOLUTION ORAL at 16:30

## 2019-01-01 RX ADMIN — PREGABALIN 75 MG: 75 CAPSULE ORAL at 13:14

## 2019-01-01 RX ADMIN — TRAMADOL HYDROCHLORIDE 50 MG: 50 TABLET, COATED ORAL at 15:20

## 2019-01-01 RX ADMIN — APIXABAN 2.5 MG: 2.5 TABLET, FILM COATED ORAL at 10:14

## 2019-01-01 RX ADMIN — SIMVASTATIN 10 MG: 10 TABLET, FILM COATED ORAL at 21:20

## 2019-01-01 RX ADMIN — METOPROLOL TARTRATE 25 MG: 25 TABLET ORAL at 21:27

## 2019-01-01 RX ADMIN — TORSEMIDE 20 MG: 20 TABLET ORAL at 08:00

## 2019-01-01 RX ADMIN — TRAMADOL HYDROCHLORIDE 50 MG: 50 TABLET, COATED ORAL at 00:20

## 2019-01-01 RX ADMIN — HYDROMORPHONE HYDROCHLORIDE 1 MG: 5 SOLUTION ORAL at 02:35

## 2019-01-01 RX ADMIN — ACETAMINOPHEN 650 MG: 325 TABLET, FILM COATED ORAL at 13:54

## 2019-01-01 RX ADMIN — INSULIN ASPART 1 UNITS: 100 INJECTION, SOLUTION INTRAVENOUS; SUBCUTANEOUS at 08:08

## 2019-01-01 RX ADMIN — ACETAMINOPHEN 650 MG: 325 TABLET, FILM COATED ORAL at 22:59

## 2019-01-01 RX ADMIN — SPIRONOLACTONE 25 MG: 25 TABLET ORAL at 10:47

## 2019-01-01 RX ADMIN — TORSEMIDE 20 MG: 20 TABLET ORAL at 10:15

## 2019-01-01 RX ADMIN — IPRATROPIUM BROMIDE AND ALBUTEROL SULFATE 3 ML: .5; 3 SOLUTION RESPIRATORY (INHALATION) at 07:22

## 2019-01-01 RX ADMIN — POLYETHYLENE GLYCOL 3350 17 G: 17 POWDER, FOR SOLUTION ORAL at 08:03

## 2019-01-01 RX ADMIN — AZITHROMYCIN 250 MG: 250 TABLET, FILM COATED ORAL at 08:03

## 2019-01-01 RX ADMIN — APIXABAN 2.5 MG: 2.5 TABLET, FILM COATED ORAL at 21:36

## 2019-01-01 RX ADMIN — PREGABALIN 75 MG: 75 CAPSULE ORAL at 21:37

## 2019-01-01 RX ADMIN — TRAMADOL HYDROCHLORIDE 50 MG: 50 TABLET, COATED ORAL at 02:58

## 2019-01-01 RX ADMIN — TRAMADOL HYDROCHLORIDE 50 MG: 50 TABLET, COATED ORAL at 01:46

## 2019-01-01 RX ADMIN — RANITIDINE 150 MG: 150 TABLET ORAL at 21:20

## 2019-01-01 RX ADMIN — SIMVASTATIN 10 MG: 10 TABLET, FILM COATED ORAL at 21:13

## 2019-01-01 RX ADMIN — RANITIDINE 150 MG: 150 TABLET ORAL at 21:05

## 2019-01-01 RX ADMIN — POTASSIUM CHLORIDE 20 MEQ: 1500 TABLET, EXTENDED RELEASE ORAL at 13:39

## 2019-01-01 RX ADMIN — PREGABALIN 75 MG: 75 CAPSULE ORAL at 21:21

## 2019-01-01 RX ADMIN — Medication 1 MG: at 22:04

## 2019-01-01 RX ADMIN — PREGABALIN 75 MG: 75 CAPSULE ORAL at 09:10

## 2019-01-01 RX ADMIN — SODIUM CHLORIDE 500 ML: 9 INJECTION, SOLUTION INTRAVENOUS at 02:18

## 2019-01-01 RX ADMIN — PHENYLEPHRINE HYDROCHLORIDE 100 MCG: 10 INJECTION, SOLUTION INTRAMUSCULAR; INTRAVENOUS; SUBCUTANEOUS at 12:02

## 2019-01-01 RX ADMIN — METOPROLOL TARTRATE 25 MG: 25 TABLET ORAL at 21:03

## 2019-01-01 RX ADMIN — IRON SUCROSE 300 MG: 20 INJECTION, SOLUTION INTRAVENOUS at 09:48

## 2019-01-01 RX ADMIN — FLUTICASONE PROPIONATE 2 SPRAY: 50 SPRAY, METERED NASAL at 10:08

## 2019-01-01 RX ADMIN — PREGABALIN 75 MG: 75 CAPSULE ORAL at 10:47

## 2019-01-01 RX ADMIN — SIMVASTATIN 10 MG: 10 TABLET, FILM COATED ORAL at 21:00

## 2019-01-01 RX ADMIN — PREGABALIN 75 MG: 75 CAPSULE ORAL at 20:56

## 2019-01-01 RX ADMIN — LEVOFLOXACIN 750 MG: 5 INJECTION, SOLUTION INTRAVENOUS at 10:08

## 2019-01-01 RX ADMIN — BUMETANIDE 1 MG: 0.25 INJECTION INTRAMUSCULAR; INTRAVENOUS at 11:17

## 2019-01-01 RX ADMIN — POTASSIUM CHLORIDE 20 MEQ: 1500 TABLET, EXTENDED RELEASE ORAL at 02:39

## 2019-01-01 RX ADMIN — SODIUM CHLORIDE, POTASSIUM CHLORIDE, SODIUM LACTATE AND CALCIUM CHLORIDE: 600; 310; 30; 20 INJECTION, SOLUTION INTRAVENOUS at 16:34

## 2019-01-01 RX ADMIN — METOPROLOL TARTRATE 25 MG: 25 TABLET, FILM COATED ORAL at 10:47

## 2019-01-01 RX ADMIN — METOPROLOL TARTRATE 25 MG: 25 TABLET ORAL at 08:47

## 2019-01-01 RX ADMIN — POTASSIUM CHLORIDE 20 MEQ: 1.5 POWDER, FOR SOLUTION ORAL at 05:59

## 2019-01-01 RX ADMIN — HYDROMORPHONE HYDROCHLORIDE 1 MG: 5 SOLUTION ORAL at 20:54

## 2019-01-01 RX ADMIN — ONDANSETRON 4 MG: 2 INJECTION INTRAMUSCULAR; INTRAVENOUS at 11:44

## 2019-01-01 RX ADMIN — CEFEPIME 2 G: 2 INJECTION, POWDER, FOR SOLUTION INTRAVENOUS at 03:33

## 2019-01-01 RX ADMIN — INSULIN ASPART 2 UNITS: 100 INJECTION, SOLUTION INTRAVENOUS; SUBCUTANEOUS at 13:00

## 2019-01-01 RX ADMIN — METOPROLOL TARTRATE 25 MG: 25 TABLET ORAL at 20:53

## 2019-01-01 RX ADMIN — TRAMADOL HYDROCHLORIDE 100 MG: 50 TABLET, COATED ORAL at 10:04

## 2019-01-01 RX ADMIN — METOPROLOL TARTRATE 25 MG: 25 TABLET ORAL at 09:08

## 2019-01-01 RX ADMIN — FUROSEMIDE 40 MG: 10 INJECTION, SOLUTION INTRAVENOUS at 08:31

## 2019-01-01 RX ADMIN — SIMVASTATIN 10 MG: 10 TABLET, FILM COATED ORAL at 21:42

## 2019-01-01 RX ADMIN — RANITIDINE 75 MG: 75 TABLET, COATED ORAL at 20:36

## 2019-01-01 RX ADMIN — SIMVASTATIN 10 MG: 10 TABLET, FILM COATED ORAL at 21:40

## 2019-01-01 RX ADMIN — APIXABAN 2.5 MG: 2.5 TABLET, FILM COATED ORAL at 21:11

## 2019-01-01 RX ADMIN — GUAIFENESIN 600 MG: 600 TABLET, EXTENDED RELEASE ORAL at 16:44

## 2019-01-01 RX ADMIN — Medication 1 MG: at 01:08

## 2019-01-01 RX ADMIN — SPIRONOLACTONE 25 MG: 25 TABLET ORAL at 10:15

## 2019-01-01 RX ADMIN — APIXABAN 2.5 MG: 2.5 TABLET, FILM COATED ORAL at 20:52

## 2019-01-01 RX ADMIN — RANITIDINE 75 MG: 75 TABLET, COATED ORAL at 21:21

## 2019-01-01 RX ADMIN — ACETAMINOPHEN 650 MG: 325 TABLET, FILM COATED ORAL at 20:36

## 2019-01-01 RX ADMIN — TRAMADOL HYDROCHLORIDE 50 MG: 50 TABLET, COATED ORAL at 05:09

## 2019-01-01 RX ADMIN — MIDAZOLAM HYDROCHLORIDE 0.5 MG: 1 INJECTION, SOLUTION INTRAMUSCULAR; INTRAVENOUS at 13:51

## 2019-01-01 RX ADMIN — CEFEPIME 2 G: 2 INJECTION, POWDER, FOR SOLUTION INTRAVENOUS at 04:14

## 2019-01-01 RX ADMIN — IPRATROPIUM BROMIDE AND ALBUTEROL SULFATE 3 ML: .5; 3 SOLUTION RESPIRATORY (INHALATION) at 18:56

## 2019-01-01 RX ADMIN — MELATONIN TAB 3 MG 3 MG: 3 TAB at 21:05

## 2019-01-01 RX ADMIN — PREGABALIN 75 MG: 75 CAPSULE ORAL at 08:44

## 2019-01-01 RX ADMIN — SIMVASTATIN 10 MG: 10 TABLET, FILM COATED ORAL at 22:01

## 2019-01-01 RX ADMIN — POTASSIUM CHLORIDE 40 MEQ: 1500 TABLET, EXTENDED RELEASE ORAL at 00:03

## 2019-01-01 RX ADMIN — CEFEPIME 2 G: 2 INJECTION, POWDER, FOR SOLUTION INTRAVENOUS at 02:56

## 2019-01-01 RX ADMIN — FLUTICASONE PROPIONATE 2 SPRAY: 50 SPRAY, METERED NASAL at 21:38

## 2019-01-01 RX ADMIN — MAGNESIUM HYDROXIDE 30 ML: 400 SUSPENSION ORAL at 17:17

## 2019-01-01 RX ADMIN — PREGABALIN 75 MG: 75 CAPSULE ORAL at 21:03

## 2019-01-01 RX ADMIN — SODIUM CHLORIDE AND POTASSIUM CHLORIDE: 4.5; 1.49 INJECTION, SOLUTION INTRAVENOUS at 00:36

## 2019-01-01 RX ADMIN — TRAMADOL HYDROCHLORIDE 50 MG: 50 TABLET, COATED ORAL at 19:33

## 2019-01-01 RX ADMIN — GLYCOPYRROLATE 0.1 MG: 0.2 INJECTION, SOLUTION INTRAMUSCULAR; INTRAVENOUS at 13:17

## 2019-01-01 RX ADMIN — APIXABAN 2.5 MG: 2.5 TABLET, FILM COATED ORAL at 21:05

## 2019-01-01 RX ADMIN — MAGNESIUM HYDROXIDE 30 ML: 400 SUSPENSION ORAL at 16:50

## 2019-01-01 RX ADMIN — IPRATROPIUM BROMIDE AND ALBUTEROL SULFATE 3 ML: .5; 3 SOLUTION RESPIRATORY (INHALATION) at 15:53

## 2019-01-01 RX ADMIN — INSULIN ASPART 1 UNITS: 100 INJECTION, SOLUTION INTRAVENOUS; SUBCUTANEOUS at 13:03

## 2019-01-01 RX ADMIN — IPRATROPIUM BROMIDE AND ALBUTEROL SULFATE 3 ML: .5; 3 SOLUTION RESPIRATORY (INHALATION) at 08:01

## 2019-01-01 RX ADMIN — CEFEPIME 2 G: 2 INJECTION, POWDER, FOR SOLUTION INTRAVENOUS at 03:37

## 2019-01-01 RX ADMIN — TRAMADOL HYDROCHLORIDE 50 MG: 50 TABLET, COATED ORAL at 17:51

## 2019-01-01 RX ADMIN — AZITHROMYCIN 250 MG: 250 TABLET, FILM COATED ORAL at 08:44

## 2019-01-01 RX ADMIN — FLUTICASONE PROPIONATE 2 SPRAY: 50 SPRAY, METERED NASAL at 20:06

## 2019-01-01 RX ADMIN — FLUTICASONE PROPIONATE 2 SPRAY: 50 SPRAY, METERED NASAL at 21:47

## 2019-01-01 RX ADMIN — ACETAMINOPHEN 650 MG: 325 TABLET, FILM COATED ORAL at 22:04

## 2019-01-01 RX ADMIN — BUMETANIDE 1 MG: 0.25 INJECTION INTRAMUSCULAR; INTRAVENOUS at 17:22

## 2019-01-01 RX ADMIN — FUROSEMIDE 40 MG: 10 INJECTION, SOLUTION INTRAVENOUS at 09:34

## 2019-01-01 RX ADMIN — PREGABALIN 75 MG: 75 CAPSULE ORAL at 09:08

## 2019-01-01 RX ADMIN — METOPROLOL TARTRATE 25 MG: 25 TABLET ORAL at 21:11

## 2019-01-01 RX ADMIN — TRAMADOL HYDROCHLORIDE 50 MG: 50 TABLET, COATED ORAL at 22:05

## 2019-01-01 RX ADMIN — INSULIN ASPART 1 UNITS: 100 INJECTION, SOLUTION INTRAVENOUS; SUBCUTANEOUS at 08:03

## 2019-01-01 RX ADMIN — HYDROXYZINE PAMOATE 25 MG: 25 CAPSULE ORAL at 18:44

## 2019-01-01 RX ADMIN — PREGABALIN 75 MG: 75 CAPSULE ORAL at 09:46

## 2019-01-01 RX ADMIN — RANITIDINE 150 MG: 150 TABLET ORAL at 21:46

## 2019-01-01 RX ADMIN — SENNOSIDES AND DOCUSATE SODIUM 2 TABLET: 8.6; 5 TABLET ORAL at 21:21

## 2019-01-01 RX ADMIN — HYDROMORPHONE HYDROCHLORIDE 2 MG: 5 SOLUTION ORAL at 06:50

## 2019-01-01 RX ADMIN — METOPROLOL TARTRATE 25 MG: 25 TABLET, FILM COATED ORAL at 21:21

## 2019-01-01 RX ADMIN — ACETAMINOPHEN 500 MG: 500 TABLET, FILM COATED ORAL at 02:41

## 2019-01-01 RX ADMIN — PREGABALIN 75 MG: 75 CAPSULE ORAL at 09:42

## 2019-01-01 RX ADMIN — IPRATROPIUM BROMIDE AND ALBUTEROL SULFATE 3 ML: .5; 3 SOLUTION RESPIRATORY (INHALATION) at 11:54

## 2019-01-01 RX ADMIN — PHENYLEPHRINE HYDROCHLORIDE 200 MCG: 10 INJECTION, SOLUTION INTRAMUSCULAR; INTRAVENOUS; SUBCUTANEOUS at 12:06

## 2019-01-01 RX ADMIN — PREGABALIN 75 MG: 75 CAPSULE ORAL at 21:08

## 2019-01-01 RX ADMIN — PREGABALIN 75 MG: 75 CAPSULE ORAL at 21:27

## 2019-01-01 RX ADMIN — ACETAMINOPHEN 650 MG: 325 TABLET, FILM COATED ORAL at 23:00

## 2019-01-01 RX ADMIN — APIXABAN 2.5 MG: 2.5 TABLET, FILM COATED ORAL at 10:58

## 2019-01-01 RX ADMIN — Medication 1 MG: at 21:20

## 2019-01-01 RX ADMIN — OLANZAPINE 2.5 MG: 5 TABLET, ORALLY DISINTEGRATING ORAL at 02:23

## 2019-01-01 RX ADMIN — METOPROLOL TARTRATE 25 MG: 25 TABLET ORAL at 11:36

## 2019-01-01 RX ADMIN — SODIUM CHLORIDE, POTASSIUM CHLORIDE, SODIUM LACTATE AND CALCIUM CHLORIDE: 600; 310; 30; 20 INJECTION, SOLUTION INTRAVENOUS at 10:05

## 2019-01-01 RX ADMIN — POLYETHYLENE GLYCOL 3350 17 G: 17 POWDER, FOR SOLUTION ORAL at 08:24

## 2019-01-01 RX ADMIN — PREGABALIN 75 MG: 75 CAPSULE ORAL at 08:47

## 2019-01-01 RX ADMIN — TRAMADOL HYDROCHLORIDE 50 MG: 50 TABLET, COATED ORAL at 16:28

## 2019-01-01 RX ADMIN — POLYETHYLENE GLYCOL 3350, SODIUM SULFATE ANHYDROUS, SODIUM BICARBONATE, SODIUM CHLORIDE, POTASSIUM CHLORIDE 4000 ML: 236; 22.74; 6.74; 5.86; 2.97 POWDER, FOR SOLUTION ORAL at 17:11

## 2019-01-01 RX ADMIN — BUMETANIDE 1 MG: 0.25 INJECTION INTRAMUSCULAR; INTRAVENOUS at 10:00

## 2019-01-01 RX ADMIN — HYDROMORPHONE HYDROCHLORIDE 2 MG: 5 SOLUTION ORAL at 09:14

## 2019-01-01 RX ADMIN — RANITIDINE 150 MG: 150 TABLET ORAL at 21:36

## 2019-01-01 RX ADMIN — HYDROXYZINE HYDROCHLORIDE 25 MG: 25 TABLET ORAL at 12:58

## 2019-01-01 RX ADMIN — METOPROLOL TARTRATE 25 MG: 25 TABLET, FILM COATED ORAL at 20:52

## 2019-01-01 RX ADMIN — INSULIN ASPART 2 UNITS: 100 INJECTION, SOLUTION INTRAVENOUS; SUBCUTANEOUS at 12:46

## 2019-01-01 RX ADMIN — TORSEMIDE 20 MG: 20 TABLET ORAL at 08:01

## 2019-01-01 RX ADMIN — AZITHROMYCIN 250 MG: 250 TABLET, FILM COATED ORAL at 09:45

## 2019-01-01 RX ADMIN — TORSEMIDE 20 MG: 20 TABLET ORAL at 14:20

## 2019-01-01 RX ADMIN — PREGABALIN 75 MG: 75 CAPSULE ORAL at 21:20

## 2019-01-01 RX ADMIN — TORSEMIDE 20 MG: 20 TABLET ORAL at 08:54

## 2019-01-01 RX ADMIN — TRAMADOL HYDROCHLORIDE 50 MG: 50 TABLET, COATED ORAL at 14:11

## 2019-01-01 RX ADMIN — IPRATROPIUM BROMIDE AND ALBUTEROL SULFATE 3 ML: .5; 3 SOLUTION RESPIRATORY (INHALATION) at 15:30

## 2019-01-01 RX ADMIN — RANITIDINE 75 MG: 75 TABLET, COATED ORAL at 09:03

## 2019-01-01 RX ADMIN — AZITHROMYCIN DIHYDRATE 500 MG: 500 INJECTION, POWDER, LYOPHILIZED, FOR SOLUTION INTRAVENOUS at 16:05

## 2019-01-01 RX ADMIN — ACETAMINOPHEN 650 MG: 325 TABLET, FILM COATED ORAL at 19:59

## 2019-01-01 RX ADMIN — FUROSEMIDE 80 MG: 10 INJECTION, SOLUTION INTRAVENOUS at 15:34

## 2019-01-01 RX ADMIN — APIXABAN 2.5 MG: 2.5 TABLET, FILM COATED ORAL at 22:00

## 2019-01-01 RX ADMIN — RANITIDINE 150 MG: 150 TABLET ORAL at 21:00

## 2019-01-01 RX ADMIN — TRAMADOL HYDROCHLORIDE 50 MG: 50 TABLET, COATED ORAL at 06:20

## 2019-01-01 RX ADMIN — IPRATROPIUM BROMIDE AND ALBUTEROL SULFATE 3 ML: .5; 3 SOLUTION RESPIRATORY (INHALATION) at 07:36

## 2019-01-01 RX ADMIN — INSULIN ASPART 1 UNITS: 100 INJECTION, SOLUTION INTRAVENOUS; SUBCUTANEOUS at 12:27

## 2019-01-01 RX ADMIN — BUMETANIDE 1 MG: 0.25 INJECTION INTRAMUSCULAR; INTRAVENOUS at 00:17

## 2019-01-01 RX ADMIN — HYDROMORPHONE HYDROCHLORIDE 2 MG: 5 SOLUTION ORAL at 04:35

## 2019-01-01 RX ADMIN — TRAMADOL HYDROCHLORIDE 50 MG: 50 TABLET, COATED ORAL at 23:04

## 2019-01-01 RX ADMIN — SIMVASTATIN 10 MG: 10 TABLET, FILM COATED ORAL at 20:55

## 2019-01-01 RX ADMIN — FUROSEMIDE 40 MG: 10 INJECTION, SOLUTION INTRAVENOUS at 16:57

## 2019-01-01 RX ADMIN — FUROSEMIDE 40 MG: 10 INJECTION, SOLUTION INTRAVENOUS at 16:51

## 2019-01-01 RX ADMIN — FLUTICASONE PROPIONATE 2 SPRAY: 50 SPRAY, METERED NASAL at 20:00

## 2019-01-01 RX ADMIN — PREGABALIN 75 MG: 75 CAPSULE ORAL at 21:07

## 2019-01-01 RX ADMIN — ACETAMINOPHEN 650 MG: 325 TABLET, FILM COATED ORAL at 04:14

## 2019-01-01 RX ADMIN — IPRATROPIUM BROMIDE AND ALBUTEROL SULFATE 3 ML: .5; 3 SOLUTION RESPIRATORY (INHALATION) at 10:49

## 2019-01-01 RX ADMIN — METOPROLOL TARTRATE 25 MG: 25 TABLET, FILM COATED ORAL at 10:09

## 2019-01-01 RX ADMIN — TRAMADOL HYDROCHLORIDE 50 MG: 50 TABLET, COATED ORAL at 18:55

## 2019-01-01 RX ADMIN — FUROSEMIDE 40 MG: 10 INJECTION, SOLUTION INTRAVENOUS at 05:56

## 2019-01-01 RX ADMIN — RANITIDINE 75 MG: 75 TABLET, COATED ORAL at 21:27

## 2019-01-01 RX ADMIN — ATROPINE SULFATE 2 DROP: 10 SOLUTION/ DROPS OPHTHALMIC at 09:14

## 2019-01-01 RX ADMIN — BUMETANIDE 1 MG: 0.25 INJECTION INTRAMUSCULAR; INTRAVENOUS at 07:54

## 2019-01-01 RX ADMIN — BISACODYL 10 MG: 5 TABLET, COATED ORAL at 21:23

## 2019-01-01 RX ADMIN — MIDAZOLAM HYDROCHLORIDE 1 MG: 1 INJECTION, SOLUTION INTRAMUSCULAR; INTRAVENOUS at 13:45

## 2019-01-01 RX ADMIN — METOPROLOL TARTRATE 25 MG: 25 TABLET, FILM COATED ORAL at 20:56

## 2019-01-01 RX ADMIN — RANITIDINE 75 MG: 75 TABLET, COATED ORAL at 08:32

## 2019-01-01 RX ADMIN — IPRATROPIUM BROMIDE AND ALBUTEROL SULFATE 3 ML: .5; 3 SOLUTION RESPIRATORY (INHALATION) at 21:04

## 2019-01-01 RX ADMIN — SODIUM CHLORIDE 250 ML: 9 INJECTION, SOLUTION INTRAVENOUS at 17:20

## 2019-01-01 RX ADMIN — RANITIDINE 150 MG: 150 TABLET ORAL at 21:40

## 2019-01-01 RX ADMIN — SIMVASTATIN 10 MG: 10 TABLET, FILM COATED ORAL at 22:32

## 2019-01-01 RX ADMIN — IPRATROPIUM BROMIDE AND ALBUTEROL SULFATE 3 ML: .5; 3 SOLUTION RESPIRATORY (INHALATION) at 11:49

## 2019-01-01 RX ADMIN — METOPROLOL TARTRATE 25 MG: 25 TABLET ORAL at 22:01

## 2019-01-01 RX ADMIN — TOPICAL ANESTHETIC 0.5 ML: 200 SPRAY DENTAL; PERIODONTAL at 13:36

## 2019-01-01 RX ADMIN — POLYETHYLENE GLYCOL 3350 17 G: 17 POWDER, FOR SOLUTION ORAL at 10:48

## 2019-01-01 RX ADMIN — FLUTICASONE PROPIONATE 2 SPRAY: 50 SPRAY, METERED NASAL at 21:16

## 2019-01-01 RX ADMIN — PREGABALIN 75 MG: 75 CAPSULE ORAL at 20:53

## 2019-01-01 ASSESSMENT — ACTIVITIES OF DAILY LIVING (ADL)
ADLS_ACUITY_SCORE: 14
ADLS_ACUITY_SCORE: 17
ADLS_ACUITY_SCORE: 16
ADLS_ACUITY_SCORE: 16
BATHING: 1-->ASSISTIVE EQUIPMENT
RETIRED_EATING: 0-->INDEPENDENT
ADLS_ACUITY_SCORE: 28
ADLS_ACUITY_SCORE: 19
ADLS_ACUITY_SCORE: 16
ADLS_ACUITY_SCORE: 19
ADLS_ACUITY_SCORE: 17
ADLS_ACUITY_SCORE: 17
ADLS_ACUITY_SCORE: 20
ADLS_ACUITY_SCORE: 27
ADLS_ACUITY_SCORE: 18
RETIRED_COMMUNICATION: 0-->UNDERSTANDS/COMMUNICATES WITHOUT DIFFICULTY
ADLS_ACUITY_SCORE: 17
ADLS_ACUITY_SCORE: 16
DEPENDENT_IADLS:: CLEANING;COOKING;LAUNDRY;MEAL PREPARATION;MEDICATION MANAGEMENT;TRANSPORTATION
ADLS_ACUITY_SCORE: 17
ADLS_ACUITY_SCORE: 14
FALL_HISTORY_WITHIN_LAST_SIX_MONTHS: YES
ADLS_ACUITY_SCORE: 14
ADLS_ACUITY_SCORE: 17
ADLS_ACUITY_SCORE: 19
ADLS_ACUITY_SCORE: 21
ADLS_ACUITY_SCORE: 19
ADLS_ACUITY_SCORE: 17
WHICH_OF_THE_ABOVE_FUNCTIONAL_RISKS_HAD_A_RECENT_ONSET_OR_CHANGE?: FALL HISTORY
ADLS_ACUITY_SCORE: 16
ADLS_ACUITY_SCORE: 14
ADLS_ACUITY_SCORE: 29
ADLS_ACUITY_SCORE: 29
ADLS_ACUITY_SCORE: 21
ADLS_ACUITY_SCORE: 21
ADLS_ACUITY_SCORE: 30
ADLS_ACUITY_SCORE: 28
ADLS_ACUITY_SCORE: 28
ADLS_ACUITY_SCORE: 30
ADLS_ACUITY_SCORE: 28
ADLS_ACUITY_SCORE: 17
ADLS_ACUITY_SCORE: 26
ADLS_ACUITY_SCORE: 27
ADLS_ACUITY_SCORE: 30
TOILETING: 0-->INDEPENDENT
ADLS_ACUITY_SCORE: 28
ADLS_ACUITY_SCORE: 16
TRANSFERRING: 1-->ASSISTIVE EQUIPMENT
RETIRED_EATING: 0-->INDEPENDENT
ADLS_ACUITY_SCORE: 28
ADLS_ACUITY_SCORE: 17
ADLS_ACUITY_SCORE: 29
ADLS_ACUITY_SCORE: 28
ADLS_ACUITY_SCORE: 17
ADLS_ACUITY_SCORE: 17
ADLS_ACUITY_SCORE: 16
ADLS_ACUITY_SCORE: 18
ADLS_ACUITY_SCORE: 28
ADLS_ACUITY_SCORE: 21
ADLS_ACUITY_SCORE: 33
ADLS_ACUITY_SCORE: 29
COGNITION: 0 - NO COGNITION ISSUES REPORTED
TRANSFERRING: 1-->ASSISTIVE EQUIPMENT
ADLS_ACUITY_SCORE: 20
ADLS_ACUITY_SCORE: 14
ADLS_ACUITY_SCORE: 28
BATHING: 0-->INDEPENDENT
SWALLOWING: 0-->SWALLOWS FOODS/LIQUIDS WITHOUT DIFFICULTY
ADLS_ACUITY_SCORE: 16
DEPENDENT_IADLS:: CLEANING;COOKING;LAUNDRY;MEAL PREPARATION;MEDICATION MANAGEMENT;TRANSPORTATION;SHOPPING
ADLS_ACUITY_SCORE: 28
ADLS_ACUITY_SCORE: 17
ADLS_ACUITY_SCORE: 20
ADLS_ACUITY_SCORE: 19
DRESS: 0-->INDEPENDENT
ADLS_ACUITY_SCORE: 26
DRESS: 0-->INDEPENDENT
ADLS_ACUITY_SCORE: 16
ADLS_ACUITY_SCORE: 32
ADLS_ACUITY_SCORE: 29
ADLS_ACUITY_SCORE: 28
ADLS_ACUITY_SCORE: 28
ADLS_ACUITY_SCORE: 18
ADLS_ACUITY_SCORE: 18
ADLS_ACUITY_SCORE: 16
RETIRED_COMMUNICATION: 0-->UNDERSTANDS/COMMUNICATES WITHOUT DIFFICULTY
ADLS_ACUITY_SCORE: 14
ADLS_ACUITY_SCORE: 28
ADLS_ACUITY_SCORE: 29
ADLS_ACUITY_SCORE: 20
ADLS_ACUITY_SCORE: 21
TOILETING: 0-->INDEPENDENT
ADLS_ACUITY_SCORE: 20
ADLS_ACUITY_SCORE: 30
ADLS_ACUITY_SCORE: 17
ADLS_ACUITY_SCORE: 32
ADLS_ACUITY_SCORE: 16
ADLS_ACUITY_SCORE: 16
ADLS_ACUITY_SCORE: 19
SWALLOWING: 0-->SWALLOWS FOODS/LIQUIDS WITHOUT DIFFICULTY
ADLS_ACUITY_SCORE: 19
ADLS_ACUITY_SCORE: 28
ADLS_ACUITY_SCORE: 16
ADLS_ACUITY_SCORE: 21
ADLS_ACUITY_SCORE: 30
COGNITION: 0 - NO COGNITION ISSUES REPORTED
ADLS_ACUITY_SCORE: 18
ADLS_ACUITY_SCORE: 17
ADLS_ACUITY_SCORE: 21
ADLS_ACUITY_SCORE: 17
ADLS_ACUITY_SCORE: 28
ADLS_ACUITY_SCORE: 17
DEPENDENT_IADLS:: CLEANING;COOKING;LAUNDRY;MEAL PREPARATION;MEDICATION MANAGEMENT;TRANSPORTATION;SHOPPING
ADLS_ACUITY_SCORE: 16
AMBULATION: 1-->ASSISTIVE EQUIPMENT
ADLS_ACUITY_SCORE: 28
ADLS_ACUITY_SCORE: 28
ADLS_ACUITY_SCORE: 14
FALL_HISTORY_WITHIN_LAST_SIX_MONTHS: NO
ADLS_ACUITY_SCORE: 20
ADLS_ACUITY_SCORE: 30
WHICH_OF_THE_ABOVE_FUNCTIONAL_RISKS_HAD_A_RECENT_ONSET_OR_CHANGE?: AMBULATION;TRANSFERRING;DRESSING
ADLS_ACUITY_SCORE: 17
ADLS_ACUITY_SCORE: 32
ADLS_ACUITY_SCORE: 30
ADLS_ACUITY_SCORE: 19
ADLS_ACUITY_SCORE: 18
AMBULATION: 1-->ASSISTIVE EQUIPMENT
ADLS_ACUITY_SCORE: 17

## 2019-01-01 ASSESSMENT — MIFFLIN-ST. JEOR
SCORE: 1205.23
SCORE: 1246.51
SCORE: 1217.93
SCORE: 1213.88
SCORE: 1134.47
SCORE: 1211.59
SCORE: 1207.06
SCORE: 1168.96
SCORE: 1217.88
SCORE: 1173.49
SCORE: 1182.56
SCORE: 1173.49
SCORE: 1153.53
SCORE: 1231.09
SCORE: 1226.88
SCORE: 1222.92
SCORE: 1205.24
SCORE: 1248.32
SCORE: 1225.88
SCORE: 1200.71
SCORE: 1197.97

## 2019-01-01 ASSESSMENT — ENCOUNTER SYMPTOMS
VOMITING: 0
NAUSEA: 0
LIGHT-HEADEDNESS: 0
SHORTNESS OF BREATH: 1
FEVER: 0
BLOOD IN STOOL: 0
ANAL BLEEDING: 0
HEADACHES: 0
BLOOD IN STOOL: 0
ABDOMINAL PAIN: 0
BLOOD IN STOOL: 0
HEADACHES: 0
WEAKNESS: 1
NUMBNESS: 0
VOMITING: 0
BACK PAIN: 1
CHILLS: 0
RHINORRHEA: 0
NAUSEA: 0
ABDOMINAL PAIN: 0
WEAKNESS: 1
ABDOMINAL PAIN: 0
FATIGUE: 1
SHORTNESS OF BREATH: 1
DIZZINESS: 0
COUGH: 0
COUGH: 0
DYSRHYTHMIAS: 1
FEVER: 0

## 2019-01-01 ASSESSMENT — LIFESTYLE VARIABLES
TOBACCO_USE: 0
TOBACCO_USE: 1

## 2019-01-01 ASSESSMENT — COPD QUESTIONNAIRES
COPD: 1
COPD: 1

## 2019-01-02 NOTE — TELEPHONE ENCOUNTER
Patient is at TCU so request for lasix is denied   She is on Demadex apparently.  Dr.Nasima Vilma MD

## 2019-01-03 NOTE — PROGRESS NOTES
[facility] DISCHARGE SUMMARY    PATIENT'S NAME: Helene Gibbs  YOB: 1935  MEDICAL RECORD NUMBER:  [id number]  Place of Service where encounter took place:  [facility] ([facility]) [[id number]]    PRIMARY CARE PROVIDER AND CLINIC RESPONSIBLE AFTER TRANSFER: Neisha Esparza [address] / [place]                [place] [address]     TRANSFERRING PROVIDERS: Tonya Lynn Haase, APRN CNP, Dr. Mario MD  DATE OF SNF ADMISSION:  December / 26 / 2018  DATE OF SNF (anticipated) DISCHARGE: January / 04 / 2019  DISCHARGE DISPOSITION: [facility] Provider   RECENT HOSPITALIZATION/ED:  [facility] Hospital stay 12/19/18 to 12/26/18.     CODE STATUS/ADVANCE DIRECTIVES DISCUSSION:   DNR / DNI     Allergies   Allergen Reactions     Ambien [Zolpidem Tartrate] Visual Disturbance     Hallucinations and fell out of bed at night.     Penicillins Hives     Definity      Caused a syncopal episode.     Sulfa Drugs Itching     Cymbalta Rash     Fluconazole Rash     Tolerates miconazole suppositories     Condition on Discharge:  Stable.  Function:  Walking > 150 feet using a 4WW indep  Cognitive Scores: BIMS: 11/15, SBT: 3/28    Equipment: 4WW    DISCHARGE DIAGNOSIS:   1. Acute and chronic respiratory failure with hypoxia (H)    2. Acute on chronic diastolic CHF (congestive heart failure) (H)    3. Essential hypertension    4. Iron deficiency anemia due to chronic blood loss    5. Left hip postoperative wound infection    6. Bacteriuria    7. Type 2 diabetes mellitus with diabetic nephropathy, without long-term current use of insulin (H)    8. CKD (chronic kidney disease) stage 3, GFR 30-59 ml/min (H)    9. Chronic obstructive pulmonary disease, unspecified COPD type (H)    10. Physical deconditioning        PAST MEDICAL HISTORY:  has a past medical history of Anemia, Ankle arthritis, Arthritis, Back pain, Bell's palsy (9/08), Breast CA (H) (2004-), Breast cancer (H) (2004), CKD (chronic  kidney disease), Colon polyps, Congestive heart failure (H), COPD (chronic obstructive pulmonary disease) (H), Coronary artery disease, DM (diabetes mellitus) (H), GERD (gastroesophageal reflux disease), Hyperlipidemia, Hypertension, Lumbar spinal stenosis, Nicotine dependence, Obesity, Osteoporosis, Other chronic pain, Pacemaker, Permanent atrial fibrillation (H) (8/2008), Pleural effusion, Pulmonary hypertension (H), PVD (peripheral vascular disease) (H), Rectal stenosis, Tobacco abuse, and Tricuspid regurgitation. She also has no past medical history of Difficult intubation, Malignant hyperthermia, or PONV (postoperative nausea and vomiting).    DISCHARGE MEDICATIONS:  Current Outpatient Medications   Medication Sig Dispense Refill     ACCU-CHEK SMARTVIEW test strip USE 1 STRIP BY IN VITRO ROUTE 2 TIMES DAILY OR AS DIRECTED 200 strip 0     ACE/ARB/ARNI NOT PRESCRIBED (INTENTIONAL) Please choose reason not prescribed, below       acetaminophen (TYLENOL) 500 MG tablet Take 1,000 mg by mouth 3 times daily And daily PRN pain       albuterol (PROAIR HFA, PROVENTIL HFA, VENTOLIN HFA) 108 (90 BASE) MCG/ACT inhaler Inhale 2 puffs into the lungs every 4 hours as needed for shortness of breath / dyspnea or wheezing 1 Inhaler 5     amoxicillin (AMOXIL) 500 MG tablet Give 1 capsule by mouth every 12 hours for left hip infection, I and D for 26 Administrations       apixaban ANTICOAGULANT (ELIQUIS) 2.5 MG tablet Take 1 tablet (2.5 mg) by mouth 2 times daily 60 tablet 0     blood glucose monitoring (NO BRAND SPECIFIED) meter device kit Accucheck Mariza meter; verified with pharmacist 1 kit 0     CLINDAMYCIN HCL PO Give 300 mg by mouth as needed for Dental Infection Prophylaxis Give 1 tablet (300mg) 4 times daily as needed. To be given the day prior to dental procedure       denosumab (PROLIA) 60 MG/ML SOLN injection Inject 1 mL (60 mg) Subcutaneous every 6 months 1 mL 1     ferrous sulfate (FEROSUL) 325 (65 Fe) MG tablet Take 1  tablet (325 mg) by mouth every other day 15 tablet 0     glipiZIDE (GLUCOTROL) 5 MG tablet Take 1 tablet (5 mg) by mouth every morning (before breakfast) 90 tablet 0     hydrOXYzine (VISTARIL) 25 MG capsule Take 1 capsule (25 mg) by mouth 2 times daily as needed for itching 180 capsule 1     ipratropium - albuterol 0.5 mg/2.5 mg/3 mL (DUONEB) 0.5-2.5 (3) MG/3ML neb solution Take 1 vial by nebulization 4 times daily       Lidocaine (LIDOCARE) 4 % Patch Place 3 patches onto the skin daily as needed for moderate pain       metoprolol tartrate (LOPRESSOR) 25 MG tablet Take 1 tablet (25 mg) by mouth 2 times daily 180 tablet 3     minocycline (MINOCIN/DYNACIN) 100 MG capsule Give 1 capsule by mouth every 12 hours for left hip infection, I and D for 26 Administrations       ondansetron (ZOFRAN ODT) 4 MG ODT tab Take 1 tablet (4 mg) by mouth every 6 hours as needed for nausea 30 tablet 1     oxyCODONE (ROXICODONE) 5 MG tablet Take 5 mg by mouth every 4 hours as needed for severe pain       potassium chloride (KLOR-CON) 20 MEQ Packet Take 20 mEq by mouth daily       pregabalin (LYRICA) 75 MG capsule Take 1 capsule (75 mg) by mouth 2 times daily 60 capsule 0     ranitidine (ZANTAC) 150 MG tablet TAKE ONE TABLET BY MOUTH TWO TIMES A  tablet 3     simvastatin (ZOCOR) 10 MG tablet Take 1 tablet (10 mg) by mouth At Bedtime 90 tablet 3     tiotropium (SPIRIVA) 18 MCG capsule Inhale 1 capsule (18 mcg) into the lungs every evening 30 capsule 11     torsemide (DEMADEX) 20 MG tablet Take 1 tablet (20 mg) by mouth daily 30 tablet 1       MEDICATION CHANGES/RATIONALE:   duonebs QID for 7 days started on 12/27/18  Last 3 BPs: 104/64, 99/61, 116/63 mmHg  HR Ranges: 61-70 bpm  Admission Weight: 12/26/18: 174.4 lbs  Current Weights: 12/29/18: 173.5 lbs - 1/2/19: 171.9 lbs  BG  AM: 112-218 mg/dL  PM: 118-292 mg/dL  Controlled medications sent with patient:   not applicable/none     ROS:    10 point ROS of systems including  Constitutional, Eyes, Respiratory, Cardiovascular, Gastroenterology, Genitourinary, Integumentary, Musculoskeletal, Psychiatric were all negative except for pertinent positives noted in my HPI.    Physical Exam:   Vitals: /64   Pulse 69   Temp 97.3  F (36.3  C)   Resp 14   Wt 78 kg (171 lb 14.4 oz)   LMP  (LMP Unknown)   SpO2 93%   BMI 28.61 kg/m    BMI= Body mass index is 28.61 kg/m .    GENERAL APPEARANCE:  Alert, in no distress  ENT:  Mouth and posterior oropharynx normal, moist mucous membranes, [place]  EYES:  EOM, conjunctivae, lids, pupils and irises normal, PERRL  RESP:  respiratory effort and palpation of chest normal, lungs clear to auscultation , no respiratory distress, diminished breath sounds bases bilaterally  CV:  Palpation and auscultation of heart done , regular rate and rhythm, no murmur, rub, or gallop, peripheral edema trace+ in LE bilaterally  ABDOMEN:  normal bowel sounds, soft, nontender, no hepatosplenomegaly or other masses  M/S:   Examination of:   right upper extremity, left upper extremity, right lower extremity and left lower extremity  Inspection, ROM, stability and muscle strength normal  SKIN:  Inspection of skin and subcutaneous tissue baseline  NEURO:   Cranial nerves 2-12 are normal tested and grossly at patient's baseline, speech WNL  PSYCH:  affect and mood normal     HPI Nursing Facility Course: Patient progressed in therapy to walking > 150 feet using a 4ww indep, indep with ADL's, weaned off O2, will DC home with home PT and RN through [facility].   HPI information obtained from: facility chart records, facility staff, patient report and [facility] chart review.      Acute and chronic respiratory failure with hypoxia (H)  Acute on chronic diastolic CHF (congestive heart failure) (H)  Essential hypertension  Acute on chronic. Weaned off O2, continue torsemide 20mg QD, KCL 20meq QD, metoprolol 25mg BID, continue daily weights and home RN for weight and vitals  monitoring     Iron deficiency anemia due to chronic blood loss  Ongoing issue.      Left hip postoperative wound infection  Physical deconditioning     S/p I and D. Antibiotics, ortho f/u as ordered.  DC home with home PT through [facility], HAIM oxycodone and vistaril at discharge      Type 2 diabetes mellitus with diabetic nephropathy, without long-term current use of insulin (H)  CKD (chronic kidney disease) stage 3, GFR 30-59 ml/min (H)  Chronic, stable. Monitor blood sugars per home routine, continue glucotrol 5mg QD, f/u with PCP     Chronic obstructive pulmonary disease, unspecified COPD type (H)  Chronic: continue spiriva 18mcg inhaled daily, and albuterol MDI 2 puffs q 4 hours prn         DISCHARGE PLAN:  Physical Therapy, Registered Nurse and From:  [facility]  Patient instructed to follow-up with:  PCP in 5-7 days       [facility] scheduled appointments:  Future Appointments   Date Time Provider Department Center   1/11/2019 10:00 AM Neisha Esparza MD [facility]       MTM referral needed and placed by this provider: No    Pending labs: CBC and BMP  this afternoon  SNF labs   1/3/19: Hgb 10.3, Na 140, K+ 5.0, BUN 39, creat 1.56  CBC RESULTS:   Recent Labs   Lab Test 12/26/18  0620 12/25/18  0612 12/24/18  0746   WBC  --  6.7 7.8   RBC  --  3.14* 3.24*   HGB 8.2* 7.7* 7.9*   HCT  --  27.0* 27.6*   MCV  --  86 85   MCH  --  24.5* 24.4*   MCHC  --  28.5* 28.6*   RDW  --  19.3* 18.0*   PLT  --  227 257       Last Basic Metabolic Panel:  Recent Labs   Lab Test 12/26/18  0620 12/25/18  0612    140   POTASSIUM 3.7 3.8   CHLORIDE 105 102   GERARD 9.1 9.0   CO2 31 33*   BUN 27 31*   CR 1.70* 1.87*   GLC 99 110*       Liver Function Studies -   Recent Labs   Lab Test 12/25/18  0612 12/24/18  0746 12/19/18  2053 12/19/18  1133   PROTTOTAL  --   --  6.5* 7.1   ALBUMIN 2.4* 2.5* 2.8* 3.1*   BILITOTAL  --   --  0.6 0.6   ALKPHOS  --   --  82 74   AST  --   --  6 6   ALT  --   --  10 9       TSH   Date  Value Ref Range Status   12/19/2018 3.62 0.40 - 4.00 mU/L Final   04/25/2017 1.56 0.40 - 4.00 mU/L Final       Lab Results   Component Value Date    A1C 5.5 10/19/2018    A1C 6.9 07/21/2018           Discharge Treatments: none    TOTAL DISCHARGE TIME:   Greater than 30 minutes  Electronically signed by:  Tonya Lynn Haase, APRN CNP

## 2019-01-03 NOTE — LETTER
1/3/2019        RE: Helene Gibbs  [address]  [facility] [address]          [facility] DISCHARGE SUMMARY    PATIENT'S NAME: Helene Gibbs  YOB: 1935  MEDICAL RECORD NUMBER:  [id number]  Place of Service where encounter took place:  [facility] ([facility]) [[id number]]    PRIMARY CARE PROVIDER AND CLINIC RESPONSIBLE AFTER TRANSFER: NovadiogenesJoseNeisha R [address] / [place]                [place] [address]     TRANSFERRING PROVIDERS: Tonya Lynn Haase, APRN CNP, Dr. Mario MD  DATE OF SNF ADMISSION:  December / 26 / 2018  DATE OF SNF (anticipated) DISCHARGE: January / 04 / 2019  DISCHARGE DISPOSITION: [facility] Provider   RECENT HOSPITALIZATION/ED:  [facility] Hospital stay 12/19/18 to 12/26/18.     CODE STATUS/ADVANCE DIRECTIVES DISCUSSION:   DNR / DNI     Allergies   Allergen Reactions     Ambien [Zolpidem Tartrate] Visual Disturbance     Hallucinations and fell out of bed at night.     Penicillins Hives     Definity      Caused a syncopal episode.     Sulfa Drugs Itching     Cymbalta Rash     Fluconazole Rash     Tolerates miconazole suppositories     Condition on Discharge:  Stable.  Function:  Walking > 150 feet using a 4WW indep  Cognitive Scores: BIMS: 11/15, SBT: 3/28    Equipment: 4WW    DISCHARGE DIAGNOSIS:   1. Acute and chronic respiratory failure with hypoxia (H)    2. Acute on chronic diastolic CHF (congestive heart failure) (H)    3. Essential hypertension    4. Iron deficiency anemia due to chronic blood loss    5. Left hip postoperative wound infection    6. Bacteriuria    7. Type 2 diabetes mellitus with diabetic nephropathy, without long-term current use of insulin (H)    8. CKD (chronic kidney disease) stage 3, GFR 30-59 ml/min (H)    9. Chronic obstructive pulmonary disease, unspecified COPD type (H)    10. Physical deconditioning        PAST MEDICAL HISTORY:  has a past medical history of Anemia, Ankle arthritis, Arthritis, Back  pain, Bell's palsy (9/08), Breast CA (H) (2004-), Breast cancer (H) (2004), CKD (chronic kidney disease), Colon polyps, Congestive heart failure (H), COPD (chronic obstructive pulmonary disease) (H), Coronary artery disease, DM (diabetes mellitus) (H), GERD (gastroesophageal reflux disease), Hyperlipidemia, Hypertension, Lumbar spinal stenosis, Nicotine dependence, Obesity, Osteoporosis, Other chronic pain, Pacemaker, Permanent atrial fibrillation (H) (8/2008), Pleural effusion, Pulmonary hypertension (H), PVD (peripheral vascular disease) (H), Rectal stenosis, Tobacco abuse, and Tricuspid regurgitation. She also has no past medical history of Difficult intubation, Malignant hyperthermia, or PONV (postoperative nausea and vomiting).    DISCHARGE MEDICATIONS:  Current Outpatient Medications   Medication Sig Dispense Refill     ACCU-CHEK SMARTVIEW test strip USE 1 STRIP BY IN VITRO ROUTE 2 TIMES DAILY OR AS DIRECTED 200 strip 0     ACE/ARB/ARNI NOT PRESCRIBED (INTENTIONAL) Please choose reason not prescribed, below       acetaminophen (TYLENOL) 500 MG tablet Take 1,000 mg by mouth 3 times daily And daily PRN pain       albuterol (PROAIR HFA, PROVENTIL HFA, VENTOLIN HFA) 108 (90 BASE) MCG/ACT inhaler Inhale 2 puffs into the lungs every 4 hours as needed for shortness of breath / dyspnea or wheezing 1 Inhaler 5     amoxicillin (AMOXIL) 500 MG tablet Give 1 capsule by mouth every 12 hours for left hip infection, I and D for 26 Administrations       apixaban ANTICOAGULANT (ELIQUIS) 2.5 MG tablet Take 1 tablet (2.5 mg) by mouth 2 times daily 60 tablet 0     blood glucose monitoring (NO BRAND SPECIFIED) meter device kit Accucheck Mariza meter; verified with pharmacist 1 kit 0     CLINDAMYCIN HCL PO Give 300 mg by mouth as needed for Dental Infection Prophylaxis Give 1 tablet (300mg) 4 times daily as needed. To be given the day prior to dental procedure       denosumab (PROLIA) 60 MG/ML SOLN injection Inject 1 mL (60 mg)  Subcutaneous every 6 months 1 mL 1     ferrous sulfate (FEROSUL) 325 (65 Fe) MG tablet Take 1 tablet (325 mg) by mouth every other day 15 tablet 0     glipiZIDE (GLUCOTROL) 5 MG tablet Take 1 tablet (5 mg) by mouth every morning (before breakfast) 90 tablet 0     hydrOXYzine (VISTARIL) 25 MG capsule Take 1 capsule (25 mg) by mouth 2 times daily as needed for itching 180 capsule 1     ipratropium - albuterol 0.5 mg/2.5 mg/3 mL (DUONEB) 0.5-2.5 (3) MG/3ML neb solution Take 1 vial by nebulization 4 times daily       Lidocaine (LIDOCARE) 4 % Patch Place 3 patches onto the skin daily as needed for moderate pain       metoprolol tartrate (LOPRESSOR) 25 MG tablet Take 1 tablet (25 mg) by mouth 2 times daily 180 tablet 3     minocycline (MINOCIN/DYNACIN) 100 MG capsule Give 1 capsule by mouth every 12 hours for left hip infection, I and D for 26 Administrations       ondansetron (ZOFRAN ODT) 4 MG ODT tab Take 1 tablet (4 mg) by mouth every 6 hours as needed for nausea 30 tablet 1     oxyCODONE (ROXICODONE) 5 MG tablet Take 5 mg by mouth every 4 hours as needed for severe pain       potassium chloride (KLOR-CON) 20 MEQ Packet Take 20 mEq by mouth daily       pregabalin (LYRICA) 75 MG capsule Take 1 capsule (75 mg) by mouth 2 times daily 60 capsule 0     ranitidine (ZANTAC) 150 MG tablet TAKE ONE TABLET BY MOUTH TWO TIMES A  tablet 3     simvastatin (ZOCOR) 10 MG tablet Take 1 tablet (10 mg) by mouth At Bedtime 90 tablet 3     tiotropium (SPIRIVA) 18 MCG capsule Inhale 1 capsule (18 mcg) into the lungs every evening 30 capsule 11     torsemide (DEMADEX) 20 MG tablet Take 1 tablet (20 mg) by mouth daily 30 tablet 1       MEDICATION CHANGES/RATIONALE:   duonebs QID for 7 days started on 12/27/18  Last 3 BPs: 104/64, 99/61, 116/63 mmHg  HR Ranges: 61-70 bpm  Admission Weight: 12/26/18: 174.4 lbs  Current Weights: 12/29/18: 173.5 lbs - 1/2/19: 171.9 lbs  BG  AM: 112-218 mg/dL  PM: 118-292 mg/dL  Controlled medications sent  with patient:   not applicable/none     ROS:    10 point ROS of systems including Constitutional, Eyes, Respiratory, Cardiovascular, Gastroenterology, Genitourinary, Integumentary, Musculoskeletal, Psychiatric were all negative except for pertinent positives noted in my HPI.    Physical Exam:   Vitals: /64   Pulse 69   Temp 97.3  F (36.3  C)   Resp 14   Wt 78 kg (171 lb 14.4 oz)   LMP  (LMP Unknown)   SpO2 93%   BMI 28.61 kg/m     BMI= Body mass index is 28.61 kg/m .    GENERAL APPEARANCE:  Alert, in no distress  ENT:  Mouth and posterior oropharynx normal, moist mucous membranes, [place]  EYES:  EOM, conjunctivae, lids, pupils and irises normal, PERRL  RESP:  respiratory effort and palpation of chest normal, lungs clear to auscultation , no respiratory distress, diminished breath sounds bases bilaterally  CV:  Palpation and auscultation of heart done , regular rate and rhythm, no murmur, rub, or gallop, peripheral edema trace+ in LE bilaterally  ABDOMEN:  normal bowel sounds, soft, nontender, no hepatosplenomegaly or other masses  M/S:   Examination of:   right upper extremity, left upper extremity, right lower extremity and left lower extremity  Inspection, ROM, stability and muscle strength normal  SKIN:  Inspection of skin and subcutaneous tissue baseline  NEURO:   Cranial nerves 2-12 are normal tested and grossly at patient's baseline, speech WNL  PSYCH:  affect and mood normal     HPI Nursing Facility Course: Patient progressed in therapy to walking > 150 feet using a 4ww indep, indep with ADL's, weaned off O2, will DC home with home PT and RN through [facility].   HPI information obtained from: facility chart records, facility staff, patient report and [facility] chart review.      Acute and chronic respiratory failure with hypoxia (H)  Acute on chronic diastolic CHF (congestive heart failure) (H)  Essential hypertension  Acute on chronic. Weaned off O2, continue torsemide 20mg QD, KCL 20meq QD,  metoprolol 25mg BID, continue daily weights and home RN for weight and vitals monitoring     Iron deficiency anemia due to chronic blood loss  Ongoing issue.      Left hip postoperative wound infection  Physical deconditioning     S/p I and D. Antibiotics, ortho f/u as ordered.  DC home with home PT through [facility], HAIM oxycodone and vistaril at discharge      Type 2 diabetes mellitus with diabetic nephropathy, without long-term current use of insulin (H)  CKD (chronic kidney disease) stage 3, GFR 30-59 ml/min (H)  Chronic, stable. Monitor blood sugars per home routine, continue glucotrol 5mg QD, f/u with PCP     Chronic obstructive pulmonary disease, unspecified COPD type (H)  Chronic: continue spiriva 18mcg inhaled daily, and albuterol MDI 2 puffs q 4 hours prn         DISCHARGE PLAN:  Physical Therapy, Registered Nurse and From:  [facility]  Patient instructed to follow-up with:  PCP in 5-7 days       [facility] scheduled appointments:  Future Appointments   Date Time Provider Department Center   1/11/2019 10:00 AM Neisha Esparza MD [facility]       MTM referral needed and placed by this provider: No    Pending labs: CBC and BMP  this afternoon  SNF labs   1/3/19: Hgb 10.3, Na 140, K+ 5.0, BUN 39, creat 1.56  CBC RESULTS:   Recent Labs   Lab Test 12/26/18  0620 12/25/18  0612 12/24/18  0746   WBC  --  6.7 7.8   RBC  --  3.14* 3.24*   HGB 8.2* 7.7* 7.9*   HCT  --  27.0* 27.6*   MCV  --  86 85   MCH  --  24.5* 24.4*   MCHC  --  28.5* 28.6*   RDW  --  19.3* 18.0*   PLT  --  227 257       Last Basic Metabolic Panel:  Recent Labs   Lab Test 12/26/18  0620 12/25/18  0612    140   POTASSIUM 3.7 3.8   CHLORIDE 105 102   GERARD 9.1 9.0   CO2 31 33*   BUN 27 31*   CR 1.70* 1.87*   GLC 99 110*       Liver Function Studies -   Recent Labs   Lab Test 12/25/18  0612 12/24/18  0746 12/19/18 2053 12/19/18  1133   PROTTOTAL  --   --  6.5* 7.1   ALBUMIN 2.4* 2.5* 2.8* 3.1*   BILITOTAL  --   --  0.6 0.6   ALKPHOS   --   --  82 74   AST  --   --  6 6   ALT  --   --  10 9       TSH   Date Value Ref Range Status   12/19/2018 3.62 0.40 - 4.00 mU/L Final   04/25/2017 1.56 0.40 - 4.00 mU/L Final       Lab Results   Component Value Date    A1C 5.5 10/19/2018    A1C 6.9 07/21/2018           Discharge Treatments: none    TOTAL DISCHARGE TIME:   Greater than 30 minutes  Electronically signed by:  Tonya Lynn Haase, APRN CNP      Sincerely,        Tonya Lynn Haase, APRN CNP

## 2019-01-07 NOTE — PROGRESS NOTES
Clinic Care Coordination Contact  Care Coordination Communication    Referral Source: IP Handoff    Clinical Data: Patient was hospitalized at [facility] from 12/19/18 to 12/26/18. [facility] until patient returned home on 1/04/19.    Home Care Contact:              Home Care Agency: [facility] ()              Contact name () and phone number: [phone]. (An RN Case Manager has not been assigned as yet.              Care Coordination contacted home care: Yes              Anticipated start of care date: 1/6/19  Patient Contact:               Introduced self and role of care coordination.               Follow up appointment is scheduled for 1/11/19. However, patient said that she is not able to physically get in/out of a car at this point and would have to schedule a private pay wheelchair van in order to get to the clinic at this time.              Home care has contacted patient: Yes              Patient questions/concerns: Transportation concerns as above.     Plan:  Care Coordinator will await notification from home care staff informing  Care Coordinator of patients discharge plans/needs. SW Care Coordinator will review chart and outreach to home care every 4 weeks and as needed.      Magnolia Farooq, [facility] Care Coordination  Clinic: [facility]   Email: [email or web address]  Tele: [phone]      Addendum  1/08/19  Data: Late yesterday afternoon, writer talked with Dr. Esparza who suggested that patient's next visit with Dr. Esparza could be a telephone visit, rather than an in-office visit, if that would be helpful to patient (so that patient would not need to hire wheelchair transportation to/from the clinic).    Writer called patient this morning and reviewed above. Patient said that she (patient) would like to do a phone visit with Dr. Esparza. A phone visit appointment was set  up for patient with Dr. Esparza for 1/15/19.     Mangolia Farooq, DAMARIS  [facility] Care Coordination  Clinic: [facility]   Email: [email or web address]  Tele: [phone]

## 2019-01-07 NOTE — TELEPHONE ENCOUNTER
Chief Complaint: Acute And Chronic Respiratory Failure With Hypoxia (H),FRI 04-JAN-2019,ED/IP 3/2    [phone] (home)

## 2019-01-09 NOTE — TELEPHONE ENCOUNTER
Fax from pharmacy requesting Furosemide.  However that was discontinued and replaced with Torsemide 20mg.    TCU discharge 1/3/19 states continue Torsemide 20mg daily.  Pended Torsemide refill with Pharm note that furosemide has been discontinued.    Patient has future Telephone visit scheduled  Next 5 appointments (look out 90 days)    Gerardo 15, 2019  1:30 PM CST  Telephone Visit with Neisha Esparza MD  [facility] [address] [facility] [address] [phone]          Candida Mason RT (R)

## 2019-01-10 NOTE — TELEPHONE ENCOUNTER
"See GAILS NOTE below      torsemide (DEMADEX) 20 MG tablet 30 tablet 1 11/15/2018  --   Sig - Route: Take 1 tablet (20 mg) by mouth daily - Oral     Last Written Prescription Date:  11/15/2018  Last Fill Quantity: 30,  # refills: 1   Last office visit: 12/19/2018 with prescribing provider:     Future Office Visit:   Next 5 appointments (look out 90 days)    Gerardo 15, 2019  1:30 PM CST  Telephone Visit with Neisha Esparza MD  [facility] [address] [facility] [address] [phone]           Requested Prescriptions   Pending Prescriptions Disp Refills     torsemide (DEMADEX) 20 MG tablet 30 tablet 1     Sig: Take 1 tablet (20 mg) by mouth daily    Diuretics (Including Combos) Protocol Failed - 1/9/2019  3:42 PM       Failed - Normal serum creatinine on file in past 12 months    Recent Labs   Lab Test 01/03/19   CR 1.56*             Passed - Blood pressure under 140/90 in past 12 months    BP Readings from Last 3 Encounters:   01/03/19 104/64   12/26/18 132/65   12/26/18 119/48                Passed - Recent (12 mo) or future (30 days) visit within the authorizing provider's specialty    Patient had office visit in the last 12 months or has a visit in the next 30 days with authorizing provider or within the authorizing provider's specialty.  See \"Patient Info\" tab in inbasket, or \"Choose Columns\" in Meds & Orders section of the refill encounter.             Passed - Medication is active on med list       Passed - Patient is age 18 or older       Passed - No active pregancy on record       Passed - Normal serum potassium on file in past 12 months    Recent Labs   Lab Test 01/03/19   POTASSIUM 5.0                   Passed - Normal serum sodium on file in past 12 months    Recent Labs   Lab Test 01/03/19                Passed - No positive pregnancy test in past 12 months          "

## 2019-01-15 PROBLEM — I73.9 PERIPHERAL VASCULAR DISEASE (H): Status: ACTIVE | Noted: 2019-01-01

## 2019-01-15 PROBLEM — J96.21 ACUTE AND CHRONIC RESPIRATORY FAILURE WITH HYPOXIA (H): Status: ACTIVE | Noted: 2019-01-01

## 2019-01-15 NOTE — PROGRESS NOTES
"Helene Gibbs is a 83 year old female who is being evaluated via a telephone visit.      The patient has been notified of following (by M.A) Nikos Mendoza     \"We have found that certain health care needs can be provided without the need for a physical exam.  This service lets us provide the care you need with a short phone conversation.  If a prescription is necessary we can send it directly to your pharmacy.  If lab work is needed we can place an order for that and you can then stop by our lab to have the test done at a later time.    This telephone visit will be conducted via 3 way call with the you (the patient) , the physician/provider, and a me all on the line at the same time.  This allows your physician/provider to have the phone conversation with you while I will be taking notes for your medical record.  We will have full access to your [place] medical record during this entire phone call.    Since this is like an office visit,  will bill your insurance company for this service.  Please check with your medical insurance if this type of telephone/virtual is covered . You may be responsible for the cost of this service if insurance coverage is denied.  The typical cost is $30 (10min), $59(11-20min) and $85 (21-30min)     If during the course of the call the physician/provider feels a telephone visit is not appropriate, you will not be charged for this service\"    Consent has been obtained for this service by care team member: yes.  See the scanned image in the medical record.    S: The history as provided by the patient to the provider during this 3 way call include     Pertinent parts of the the patient's medical history reviewed and confirmed by the provider included :     Patient could not come to the office due to recent fracture as her mobility is limited  Transportation is an issue  She is followed by home health nurse so requested a phone visit until she is able to ambulate      Total time of " call between patient and provider was 10 minutes      (MD signature)  ===================================================    I have reviewed the note as documented above.  This accurately captures the substance of my conversation with the patient,    Additional provider notes:  She was admitted in the hospital on December 19  She was discharged to TCU on December 26  Her reason for admission to the [facility] was anemia and GI bleed as well as she had nonhealing wound on her left hip  She was given blood transfusion and she had debridement of her wound  She was transferred to the TCU and discharged on January 4    Discharge diagnosis was  DISCHARGE DIAGNOSIS:   1. Acute and chronic respiratory failure with hypoxia (H)    2. Acute on chronic diastolic CHF (congestive heart failure) (H)    3. Essential hypertension    4. Iron deficiency anemia due to chronic blood loss    5. Left hip postoperative wound infection    6. Bacteriuria    7. Type 2 diabetes mellitus with diabetic nephropathy, without long-term current use of insulin (H)    8. CKD (chronic kidney disease) stage 3, GFR 30-59 ml/min (H)    9. Chronic obstructive pulmonary disease, unspecified COPD type (H)    10. Physical deconditioning       Patient says she is improving and things are going in the right direction  As a home health nurse who is following her  She gets meals from the place where she lives  She is capable of doing self transfer  Has upcoming appointment with orthopedics  Taking tramadol for pain  Not taking oxycodone        Assessment/Plan:  Helene was seen today for hospital f/u.    Diagnoses and all orders for this visit:    Type 2 diabetes mellitus with diabetic nephropathy, without long-term current use of insulin (H)  -     Basic metabolic panel; Future  Diabetes is well controlled    Anemia, unspecified type  -     CBC with platelets and differential; Future  Last hemoglobin was around 10  Will recheck  She will check with her  home health nurse to see if she can draw her blood otherwise she will make lab only appointment once she is able to ambulate    Congestive heart failure, unspecified HF chronicity, unspecified heart failure type (H)  Overall it is improving and she is at her baseline    S/p left hip fracture 11/20/18 -- persistent drainage  Patient the wound is healing  There is no drainage  Getting wound care by nursing staff and has upcoming appointment to see orthopedics    She has chronic kidney disease and also history of atrial fibrillation  Her heart rate is under control and she takes Eliquis      Other orders  -     Cancel: potassium chloride (KLOR-CON) 20 MEQ packet; Take 20 mEq by mouth daily  I told her not to take potassium pills as her potassium was on upper end of normal  Will recheck a potassium  If needed will put her back  She is not taking the aspirin as she is on Eliquis and has history of GI bleed  We will follow-up with me in the next few weeks once she is capable of ambulating and has to see her eye doctor also  She was very appreciative that we were able to do a phone visit    This note was completed in part using Dragon voice recognition software.  Although reviewed after completion, some words and grammatical errors may occur.  Dr.Nasima Vilma MD

## 2019-02-02 NOTE — TELEPHONE ENCOUNTER
Patient calling requesting Hemoglobin results. Reviewed Hemoglobin results with patient. Caller verbalized understanding. Denies further questions.      Alexey Lopez RN  [facility]     Additional Information    Health Information question, no triage required and triager able to answer question    Protocols used: INFORMATION ONLY CALL-ADULT-

## 2019-02-05 NOTE — RESULT ENCOUNTER NOTE
Shakira Narayan,    This is to inform you regarding your test result.    I tried to contact you but got the VM.  Your creatinine is getting worse again .  Will check your level again in 2 weeks  Avoid dehydration.  Make lab  appointment   Hemoglobin is low but stable.      Sincerely,      Dr.Nasima Vilma MD,FACP

## 2019-02-05 NOTE — TELEPHONE ENCOUNTER
"Pending Prescriptions:                       Disp   Refills    SPIRIVA HANDIHALER 18 MCG inhaled capsule*       1            Sig: INHALE 1 CAPSULE (18 MCG) INTO THE LUNGS DAILY    Last Written Prescription Date:  7/17/18  Last Fill Quantity: 30,  # refills: 11   Last office visit: 1/15/2019 with prescribing provider:     Future Office Visit:    Requested Prescriptions   Pending Prescriptions Disp Refills     SPIRIVA HANDIHALER 18 MCG inhaled capsule [Pharmacy Med Name: SPIRIVA 18 MCG CP-HANDIHALER]  1     Sig: INHALE 1 CAPSULE (18 MCG) INTO THE LUNGS DAILY    Asthma Maintenance Inhalers - Anticholinergics Passed - 2/5/2019 10:28 AM       Passed - Patient is age 12 years or older       Passed - Recent (12 mo) or future (30 days) visit within the authorizing provider's specialty    Patient had office visit in the last 12 months or has a visit in the next 30 days with authorizing provider or within the authorizing provider's specialty.  See \"Patient Info\" tab in inbasket, or \"Choose Columns\" in Meds & Orders section of the refill encounter.             Passed - Medication is active on med list          "

## 2019-02-07 NOTE — TELEPHONE ENCOUNTER
Reason for Call:  Medication or medication refill:    Do you use a [facility]?  Name of the pharmacy and phone number for the current request:       [id number] - [place], [place] - [address]        Name of the medication requested: Glucose monitor    Other request: the monitor she has now broke today (2/7/19)    Can we leave a detailed message on this number? YES    Phone number patient can be reached at: Home number on file [phone] (home)    Best Time: any    Call taken on 2/7/2019 at 4:44 PM by Nallely Meraz

## 2019-02-08 NOTE — RESULT ENCOUNTER NOTE
Spoke to patient and discussed lab results  Advised to keep balance between intake and out put  Avoid dehydration and when weather permits come for lab appointment   I want to recheck her CBC and BMP  Orders are placed.

## 2019-02-09 NOTE — TELEPHONE ENCOUNTER
Helene is requesting a refill for Spriva as she cannot go to [facility] to get filled.  FNA refilled for one month and advised Helene to contact primary MD and Helene agreed.

## 2019-02-11 NOTE — ED AVS SNAPSHOT
Emergency Department  [address] [place] [address]  Phone:  [phone]  Fax:  [phone]                                    Helene Gibbs   MRN: [id number]    Department:   Emergency Department   Date of Visit:  2/11/2019           After Visit Summary Signature Page    I have received my discharge instructions, and my questions have been answered. I have discussed any challenges I see with this plan with the nurse or doctor.    ..........................................................................................................................................  Patient/Patient Representative Signature      ..........................................................................................................................................  Patient Representative Print Name and Relationship to Patient    ..................................................               ................................................  Date                                   Time    ..........................................................................................................................................  Reviewed by Signature/Title    ...................................................              ..............................................  Date                                               Time          22EPIC Rev 08/18

## 2019-02-12 NOTE — ED NOTES
Bed: ED09  Expected date: 2/11/19  Expected time: 10:42 PM  Means of arrival:   Comments:  Hems [address]   Fall/Pain post hip

## 2019-02-12 NOTE — PROGRESS NOTES
"Clinic Care Coordination Contact  Emergency Room Follow up    Data: Per chart review, patient was in the [facility] Emergency Room 2/11/19--2/12/19 after a fall. Diagnoses from the ER were: \"Fall, initial encounter\", \"Generalized muscle weakness\", \"Acute midline low back pain without sciatica\", and \"Anemia, unspecified type\".    Per chart review, Dr. Esparza talked with patient earlier this morning. Patient is scheduled to come into the [facility] tomorrow for labs.    Writer called [facility] (, [phone]) this morning.  stated that patient remains a current home care client. Patient's [facility] RN Case Manager is Dayanna Pizano.  will ask MsMaryam Pizano to contact writer. Writer left writer's phone number and email address.    Plan:  Care Coordinator will await notification from home care staff informing  Care Coordinator of patients discharge plans/needs.  Care Coordinator will review chart and outreach to home care every 4 weeks and as needed.      [facility] Care Coordination  Clinic: [place]   Email: [email or web address]  Tele: [phone]      Addendum  2/13/19  Data: Writer talked with [facility], today. Dayanna is a home RN and said that patient was discharged from home nursing services last week. Dayanna said that patient appears to follow her medication regime without a problem, patient's skin is fine, and patient eats all of her meals in the building's dining room--either delivered to patient in patient's apartment or goes to the dining room. (Per Dayanna, patient has tried Meals on Wheels in the past and did not like them.) Dayanna said that patient continues to have home P.T. and Home Health Aide services (for showers) at this time. Dayanna said that patient's P.T.  is Holly Bell. Dayanna will ask Holly to contact writer with an update and any on-going concerns/needs once patient is close to discharging from " "home care services.    Patient came into the clinic for lab work this afternoon. Writer met with patient and patient's niece, Enedelia, in the clinic. Patient said that she (patient) resides at [facility] in [place]. Patient hires the assisted living staff's help with her stockings each morning and each evening. Patient said that she purchases her meals in the building. Patient is currently ambulating with a walker. Per JAMIR Mcdaniels plans to work with patient regarding getting in/out of the car. (Today, patient hired a wheelchair van transport to get to/from the clinic.)      Patient told writer that she (patient) fell the other night in her apartment--her walker \"got away\" from her in the bathroom. Patient has an alert system that she wears and, she notified the building staff who came to patient's apartment. The staff then called 911. Patient and Enedelia said that patient did not break any bones but did get bruised.     Writer provided writer's contact information to patient and Enedelia.    Plan: Patient resides in assisted living, has the support of her niece, and currently has home care services. [facility] will update writer prior to patient being discharged from home care services re: any on-going concerns they might have.    [facility], [facility] Care Coordination  Clinic: [place]   Email: [email or web address]  Tele: [phone]                                            "

## 2019-02-12 NOTE — ED PROVIDER NOTES
History     Chief Complaint:  Fall    HPI   Helene Gibbs is a 83 year old female with a history of atrial fibrillation, CAD, CKD, and diabetes who presents via EMS for evaluation after a fall. Tonight, she reports falling off of toilet seat onto her tailbone. She denies hitting her head or losing consciousness. As she was unable to get up on her own, she called the nursing staff at her assisted living facility who then called EMS. EMS helped her to her feet and decided to present her to the ED for evaluation. Here, she denies any preceding symptoms, such as feeling lightheaded or unilateral weakness. She reports a history of balance issues and 2 recent left hip surgeries and believe these caused her fall today. Other than tailbone soreness, she denies any other pain today. However, she does note chronic back pain and is unsure if the soreness is different from that. She also reports an open wound on her left hand from the fall tonight. She does report increased tremors of late and has an appointment with Dr. Esparza, her primary physician, scheduled in 2 days to check her hemoglobin and creatinine, among other labs. She denies any increased leg edema from baseline, blood in her stool, recent colds, fevers, chills, nausea, vomiting, or urinary symptoms. Of note, she reports concern for her left hip because she is less than 2 months post revision. She reports this revision was due to an infection of the surgical site.     Allergies:  Ambien  Penicillins  Definity  Sulfa  Cymbalta  Fluconazole      Medications:    Albuterol inhaler  Actos  Simvastatin  Glimepiride  Lisinopril  Zantac  Gabapentin  Torsemide  Eliquis  Alprazolam  Metoprolol    Past Medical History:    Anemia  Arthritis  Anxiety & Depression  Bell's palsy  Chronic back pain  Breast cancer  CKD  CHF  COPD  CAD  Diabetes -Type II  GERD  GI Bleed  NSTEMI  Hyperlipidemia  HTN  Osteoporosis  A. Fib.   PVD  Tobacco abuse    Past Surgical History:    Left  "hip arthroplasty - 10/2018  Bilateral shoulder arthroscopies  EGD, combined  AV node ablation  Pacemaker implanted  Bilateral knee replacements  Cholecystectomy w/ cholangiograms  Breast lumpectomy  Bilateral phacoemulsification clear cornea w/ standard IOL implant    Family History:    HTN  Diabetes  CAD    Social History:  Marital Status:   [5]  Presents via EMS from Assisted Living facility  Former smoker.   Negative for alcohol use.     Review of Systems   Constitutional: Negative for chills and fever.   HENT: Negative for congestion and rhinorrhea.    Gastrointestinal: Negative for anal bleeding, blood in stool, nausea and vomiting.   Musculoskeletal: Positive for back pain and gait problem (chronic).        Tailbone pain   Neurological: Negative for dizziness, syncope, light-headedness and headaches.   All other systems reviewed and are negative.        Physical Exam     Patient Vitals for the past 24 hrs:   BP Temp Temp src Pulse Heart Rate Resp SpO2 Height   02/12/19 0039 127/69 -- -- 71 -- -- 96 % --   02/11/19 2247 -- -- -- -- 77 -- 90 % --   02/11/19 2244 (!) 138/107 97.8  F (36.6  C) Oral 71 -- 18 92 % 1.651 m (5' 5\")     Physical Exam  Constitutional:  Appears well-developed and well-nourished. Cooperative.   HENT:   Head:    Atraumatic.   Mouth/Throat:   Oropharynx is without erythema or exudate and mucous     membranes are moist.   Eyes:    Conjunctivae normal and EOM are normal.      Pupils are equal, round, and reactive to light.   Neck:    Normal range of motion. Neck supple.   Cardiovascular:  Normal rate, regular rhythm, normal heart sounds and radial and    dorsalis pedis pulses are 2+ and symmetric.    Pulmonary/Chest:  Effort normal and breath sounds normal.   Abdominal:   Soft. Bowel sounds are normal.      No splenomegaly or hepatomegaly. No tenderness. No rebound.   Musculoskeletal:  Normal range of motion. Trace edema both lower extremities. No calf tenderness. Diffusely tender " bilateral SI joints, right more than left. No step off or midline tenderness to the upper back.   Neurological:  Alert. Normal strength. No cranial nerve deficit.   Skin:    Skin is warm and dry. Skin tear on the dorsum of the left hand with surrounding ecchymosis.   Psychiatric:   Normal mood and affect.     Emergency Department Course   Imaging:  Radiographic findings were communicated with the patient who voiced understanding of the findings.  XR Pelvis 3 views:   1. No visualized acute fracture or malalignment of the pelvis.  2. Mild degenerative changes in bilateral sacroiliac joints. No  evidence of sacroiliitis.  3. A left hip arthroplasty is in place. No evidence of hardware  failure.  4. Moderate degenerative changes in the right hip joint, as per radiology.     Laboratory:  CBC: WBC: 5.0, HGB: 8.4 (L), PLT: 177  BMP: Glucose 166 (H), CO2: 35 (H), BUN: 49 (H), GFR: 21 (L), o/w WNL (Creatinine: 2.12 (L))    Interventions:  0036 Oxycodone, 5 mg, PO    Emergency Department Course:  Patient presents via EMS. Paramedics notes and vitals reviewed.   (8945) I performed an exam of the patient as documented above.      IV inserted. Medicine administered as documented above. Blood drawn. This was sent to the lab for further testing, results above.     The patient was sent for a pelvis x-ray while in the emergency department, findings above.     (4428) I applied steri strips to the the patient's skin tear on her left hand.      (9461) I rechecked the patient and discussed the results of her workup thus far.      Findings and plan explained to the Patient. Patient discharged home with instructions regarding supportive care, medications, and reasons to return. The importance of close follow-up was reviewed. The patient was prescribed Tramadol.      I personally reviewed the laboratory and imaging results with the Patient and answered all related questions prior to discharge.        Impression & Plan      Medical  Decision Making:  Helene Gibbs is a 83 year old female who presents after losing her balance and falling off her toilet when trying to stand up. She says this is a common problem for her. She has a little bit of pain on her tailbone and left hip/pelvis, but denies hitting her head, losing consciousness, or other injuries. I repaired a small skin tear on the dorsum of her left hand with steri-strips. There is surrounding ecchymosis, but no suggestion of bony injury on exam. X-ray of her pelvis with a view of the SI joint is read as being unremarkable, without signs of acute fracture.     Patient also reports her doctor has been watching her kidney function and hemoglobin. She is scheduled for lab draws this week and is wondering if I could check them here. In light of her weakness and fall, I did check a basic metabolic panel and her BUN and creatinine appear stable. Her electrolytes are unremarkable. The patient's anemia has worsened somewhat. She was 9.0 when checked earlier this month and is currently at 8.4. She had a mild decrease in blood pressure with orthostatic vital sign testing, but was asymptomatic. There has been no history of bleeding. She said her doctor is thinking about an iron transfusion. She will see her doctor and follow-up this week to discuss this.     The patient complained of generalized aches and pains. She got a dose of oxycodone here in the ED. She requested some pain medications for home. She was prescribed 10 tablets of Tramadol to use as needed. She is aware that this can be sedating. She will continue to use great care for safe walking and to avoid falls. She is still doing physical therapy both for balance and her recent left hip surgery. She will continue working with her therapist. She was discharged in good condition.      Critical Care time:  none    Diagnosis:    ICD-10-CM    1. Fall, initial encounter W19.XXXA    2. Generalized muscle weakness M62.81    3. Acute midline low  back pain without sciatica M54.5    4. Anemia, unspecified type D64.9        Disposition:  discharged to home    Discharge Medications:  traMADol 50 MG tablet  Commonly known as:  ULTRAM      Dose:  50 mg  Take 1 tablet (50 mg) by mouth every 6 hours as needed for severe pain  Quantity:  10 tablet  Refills:  0       Scribe Disclosure:  ANTOINE, Cassandra Uribe, am serving as a scribe on 2/11/2019 at 11:18 PM to personally document services performed by Foreign Liang MD based on my observations and the provider's statements to me.     Cassandra Uribe  2/11/2019    EMERGENCY DEPARTMENT       Foreign Liagn MD  02/14/19 0010

## 2019-02-12 NOTE — ED NOTES
Brought in by [facility] from assisted living complex after falling in the bathroom.  Was trying to get get off of toilet when fell backwards onto buttock.  Has pain to tailbone region.  There is also a skin tear to the back of left hand with bruising noted.  Denies passing out or hitting head.  Does normally have some back issues.

## 2019-02-12 NOTE — ED NOTES
Pt ambulated with a walker per request by MD. Pt was stable on feet with no signs of dizziness or lightheadedness. Pt had a slow but stable gait.

## 2019-02-13 NOTE — ED NOTES
Bed: ED05  Expected date:   Expected time:   Means of arrival:   Comments:  Triage     Shawanda Montana  02/13/19 4388

## 2019-02-13 NOTE — RESULT ENCOUNTER NOTE
Patient waited in the lobby for the results of hemoglobin  Her hemoglobin is 7.8  She has a history of GI bleed  She is on Eliquis  She is pale and symptomatic with shortness of breath and dizziness  At this point she would need blood transfusion  Due to her history of congestive heart failure and coronary artery disease and history of GI bleed in the past  Will hold her Eliquis  We will have low threshold for blood transfusion  I spoke to the emergency room physician   Inform them about patient   Our staff will take her to  ED via tunnel in wheelchair

## 2019-02-13 NOTE — LETTER
Transition Communication Hand-off for Care Transitions to Next Level of Care Provider    Name: Helene Gibbs  : 1935  MRN #: [id number]  Primary Care Provider: Neisha Esparza  Primary Care MD Name: Vilma  Primary Clinic: [address]  [place]                [place] [address]  Primary Care Clinic Name: [facility]  Reason for Hospitalization:  Anticoagulated [Z79.01]  Symptomatic anemia [D64.9]  Admit Date/Time: 2019  2:18 PM  Discharge Date:   Payor Source: Payor: MEDICARE / Plan: MEDICARE / Product Type: Medicare /     Readmission Assessment Measure (DONIS) Risk Score/category: High  Reason for Communication Hand-off Referral: Admission diagnoses: CHF  Fragility  Avoidable readmission within 30 days  Discharge Needs Assessment:  Needs      Most Recent Value   Equipment Currently Used at Home  walker, rolling   # of Referrals Placed by [facility]  Internal Clinic Care Coordination, MTM, Homecare, Specialty Providers, Durable Medical Equipment (DME), Transportation          Already enrolled in Tele-monitoring program and name of program:    Follow-up specialty is recommended: Yes    Follow-up plan:    Future Appointments   Date Time Provider Department Center   2019 12:30 PM Patricia Sheehan APRN CNP [facility]    2019  1:45 PM [facility]       Any outstanding tests or procedures:    Procedures     Future Labs/Procedures    Oxygen Adult     Comments:    New Home Oxygen Order 2 liter(s) by nasal cannula continuously with use of portable tank. Expected treatment length is indefinite (99 months).. Test on conserving device as applicable.    Patients who qualify for home O2 coverage under the CMS guidelines require ABG tests or O2 sat readings obtained closest to, but no earlier than 2 days prior to the discharge, as evidence of the need for home oxygen therapy. Testing must be performed while patient is in the chronic stable state. See notes for O2 sats.    I  certify that this patient, Helene Gibbs has been under my care and that I, or a nurse practitioner or physician's assistant working with me, had a face-to-face encounter that meets the face-to-face encounter requirements with this patient on 2/20/2019. The patient, Helene Gibbs was evaluated or treated in whole, or in part, for the following medical condition, which necessitates the use of the ordered oxygen. Treatment Diagnosis: congestive heart failure    Attending Provider: Wale Jeong  Physician signature: See electronic signature associated with these discharge orders  Date of Order: February 20, 2019          Referrals     Future Labs/Procedures    Home care nursing referral     Comments:    RN skilled nursing visit. RN to assess respiratory and cardiac status, hydration, nutrition and bowel status and home safety.    Your provider has ordered home care nursing services. If you have not been contacted within 2 days of your discharge please call the inpatient department phone number at [phone] .    Home Care OT Referral for Hospital Discharge     Comments:    OT to eval and treat    Your provider has ordered home care - occupational therapy. If you have not been contacted within 2 days of your discharge please call the department phone number listed on the top of this document.    Home Care PT Referral for Hospital Discharge     Comments:    PT to eval and treat    Your provider has ordered home care - physical therapy. If you have not been contacted within 2 days of your discharge please call the department phone number listed on the top of this document.            Key Recommendations:      Patient was admitted for acute CHF- received IV Bumex in hospital with improvement. Transitioned to PTA Torsemide 20mg daily,unable to get her sats above 87% on room air so patient was sent home on oxygen at 2 lit continuously. Patent was educated on low salt diet and instruction on CHF management.  Patient stating she is from an AL place and has Rns that will be able to assess if needed. Patient discharging today with home care services and would benefit from a follow up call.  Clarisa Jesus RN CC    AVS/Discharge Summary is the source of truth; this is a helpful guide for improved communication of patient story

## 2019-02-13 NOTE — PHARMACY-ADMISSION MEDICATION HISTORY
Admission medication history interview status for the 2/13/2019  admission is complete. See EPIC admission navigator for prior to admission medications     Medication history source reliability:Good    Actions taken by pharmacist (provider contacted, etc): spoke with patient who knew meds well     Additional medication history information not noted on PTA med list : states that if she needs an antibiotic she will need something for yeast infection    Medication reconciliation/reorder completed by provider prior to medication history? No    Time spent in this activity: 15 mins    Prior to Admission medications    Medication Sig Last Dose Taking? Auth Provider   albuterol (PROAIR HFA, PROVENTIL HFA, VENTOLIN HFA) 108 (90 BASE) MCG/ACT inhaler Inhale 2 puffs into the lungs every 4 hours as needed for shortness of breath / dyspnea or wheezing prn Yes Neisha Esparza MD   apixaban ANTICOAGULANT (ELIQUIS) 2.5 MG tablet Take 1 tablet (2.5 mg) by mouth 2 times daily 2/13/2019 at x1 Yes Azul Crow PA-C   denosumab (PROLIA) 60 MG/ML SOLN injection Inject 1 mL (60 mg) Subcutaneous every 6 months >6 months Yes Hector Moran MD   glipiZIDE (GLUCOTROL) 5 MG tablet Take 1 tablet (5 mg) by mouth every morning (before breakfast) 2/13/2019 at Unknown time Yes Neisha Esparza MD   hydrocortisone (CORTAID) 1 % external cream Apply topically 3 times daily as needed for itching PRN Yes Unknown, Entered By History   hydrOXYzine (VISTARIL) 25 MG capsule Take 1 capsule (25 mg) by mouth 2 times daily as needed for itching PRN Yes Neisha Esparza MD   hypromellose (ARTIFICIAL TEARS) 0.5 % SOLN ophthalmic solution Place 1 drop into both eyes every hour as needed for dry eyes PRN Yes Unknown, Entered By History   metoprolol tartrate (LOPRESSOR) 25 MG tablet Take 1 tablet (25 mg) by mouth 2 times daily 2/13/2019 at x1 Yes Neisha Esparza MD   ondansetron (ZOFRAN ODT) 4 MG ODT tab Take 1 tablet (4 mg) by mouth every 6 hours  as needed for nausea PRN Yes Neisha Esparza MD   pregabalin (LYRICA) 75 MG capsule Take 1 capsule (75 mg) by mouth 2 times daily 2/13/2019 at x1 Yes Ervin Lares MD   ranitidine (ZANTAC) 150 MG tablet TAKE ONE TABLET BY MOUTH TWO TIMES A DAY 2/13/2019 at x1 Yes Neisha Esparza MD   simvastatin (ZOCOR) 10 MG tablet Take 1 tablet (10 mg) by mouth At Bedtime 2/12/2019 at Unknown time Yes Neisha Esparza MD   tiotropium (SPIRIVA) 18 MCG inhaled capsule Inhale 18 mcg into the lungs daily 2/13/2019 at Unknown time Yes Unknown, Entered By History   torsemide (DEMADEX) 20 MG tablet TAKE 1 TABLET BY MOUTH EVERY DAY 2/13/2019 at Unknown time Yes Neisha Esparza MD   traMADol (ULTRAM) 50 MG tablet Take 1 tablet (50 mg) by mouth every 6 hours as needed for severe pain PRN Yes Foreign Liang MD   ACCU-CHEK SMARTVIEW test strip USE 1 STRIP BY IN VITRO ROUTE 2 TIMES DAILY OR AS DIRECTED   Neisha Esparza MD   ACE/ARB/ARNI NOT PRESCRIBED (INTENTIONAL) Please choose reason not prescribed, below   Neisha Esparza MD   blood glucose monitoring (NO BRAND SPECIFIED) meter device kit Accucheck Mariza meter; verified with pharmacist   Neisha Esparza MD Tiffany M. Reinitz, PharmD

## 2019-02-13 NOTE — TELEPHONE ENCOUNTER
Patient waited in the lobby for the results of hemoglobin  Came for lab only appointment as her last hemoglobin was lower I wanted to repeat  Her hemoglobin is 7.8  She has a history of GI bleed  She is on Eliquis  She is pale and symptomatic with shortness of breath and dizziness  At this point she would need blood transfusion  Due to her history of congestive heart failure and coronary artery disease and history of GI bleed in the past  Will hold her Eliquis  We will have low threshold for blood transfusion  I spoke to the emergency room physician   Inform them about patient   Our staff will take her to  ED via tunnel in wheelchair  She may need endoscopy also for further evaluation of this  Patient is in agreement with this plan  Dr.Nasima Vilma MD

## 2019-02-13 NOTE — ED NOTES
"[facility]  ED Nurse Handoff Report    ED Chief complaint: Abnormal Labs (Pt was at clinic today and hgb was low. 7.8)      ED Diagnosis:   Final diagnoses:   Symptomatic anemia   Anticoagulated       Code Status: Full Code    Allergies:   Allergies   Allergen Reactions     Ambien [Zolpidem Tartrate] Visual Disturbance     Hallucinations and fell out of bed at night.     Penicillins Hives     Definity      Caused a syncopal episode.     Sulfa Drugs Itching     Cymbalta Rash     Fluconazole Rash     Tolerates miconazole suppositories       Activity level - Baseline/Home:  Stand with Assist    Activity Level - Current:   Stand with Assist     Needed?: No    Isolation: No  Infection: Not Applicable  Bariatric?: No    Vital Signs:   Vitals:    02/13/19 1352   BP: 98/52   Resp: 14   Temp: 97.8  F (36.6  C)   TempSrc: Oral   SpO2: 91%   Weight: 70.3 kg (155 lb)   Height: 1.651 m (5' 5\")       Cardiac Rhythm: ,        Pain level:      Is this patient confused?: No   Does this patient have a guardian?  No         If yes, is there guardianship documents in the Epic \"Code/ACP\" activity?  N/A         Guardian Notified?  N/A  [place] - Suicide Severity Rating Scale Completed?  Yes  If yes, what color did the patient score?  White    Patient Report: Initial Complaint: sent from clinic with low hgb  Focused Assessment: Pt arrives through the triage sent from clinic with low hgb, she is weak and pale. 2 L N/C was applied, when she is at rest her O2 drop in the 80s.    Tests Performed: labs   Abnormal Results:   Labs Ordered and Resulted from Time of ED Arrival Up to the Time of Departure from the ED   HEMOGLOBIN - Abnormal; Notable for the following components:       Result Value    Hemoglobin 8.1 (*)     All other components within normal limits   BASIC METABOLIC PANEL - Abnormal; Notable for the following components:    Potassium 3.3 (*)     Carbon Dioxide 35 (*)     Glucose 105 (*)     Urea Nitrogen " 48 (*)     Creatinine 1.97 (*)     GFR Estimate 23 (*)     GFR Estimate If Black 27 (*)     All other components within normal limits   PERIPHERAL IV CATHETER   ABO/RH TYPE AND SCREEN   RED BLOOD CELL PREPARE ORDER UNIT   BLOOD COMPONENT       Treatments provided: 1 unit of blood     Family Comments: no family at bedside; she lives at [facility] and seems VERY independent; she uses her own wheelchair but gets around well     OBS brochure/video discussed/provided to patient/family: N/A      ED Medications:   Medications   sodium chloride (PF) 0.9% PF flush 3 mL (not administered)   sodium chloride (PF) 0.9% PF flush 3 mL (not administered)       Drips infusing?:  Yes    For the majority of the shift this patient was Green.   Interventions performed were .    Severe Sepsis OR Septic Shock Diagnosis Present: No    To be done/followed up on inpatient unit:  cont. Care     ED NURSE PHONE NUMBER: *[phone]

## 2019-02-13 NOTE — PROGRESS NOTES
RECEIVING UNIT ED HANDOFF REVIEW    ED Nurse Handoff Report was reviewed by: Roma Villalta on February 13, 2019 at 4:53 PM

## 2019-02-13 NOTE — ED PROVIDER NOTES
"  History     Chief Complaint:  Abnormal labs    HPI   Helene Gibbs is a 83 year old female, with a history of anemia, atrial fibrillation, CAD, pacemaker, and hypertension, who presents with abnormal labs from clinic. The patient states that she was evaluated at clinic today and was noted to have hemoglobin trending downwards after her recent hip surgery, prompting her ED visit for blood transfusion. Here, the patient states that she feels more tired than normal stating that she \"struggles to stay awake.\" She notes that she is able to walk without shortness of breath or other concerns. The patient denies any abdominal pain, any sign of bleeding, or black or bloody stools. The patient has a history of blood transfusion.      Allergies:  Ambien [Zolpidem Tartrate]  Penicillins  Definity  Sulfa Drugs  Cymbalta  Fluconazole      Medications:    Albuterol  Eliquis  Denosumab  Glipizide   Vistaril  Metoprolol  Lyrica  Zantac  Zocor  Spiriva     Past Medical History:    Anemia  Ankle arthritis  Bell's Palsy  CKD  Breast cancer  Colon polyps  CHF  COPD  CAD  Diabetes, type 2  GERD  Hyperlipidemia  Hypertension  Osteoporosis  Pacemaker  Atrial fibrillation   Pleural effusion  Pulmonary hypertension  PVD  Tricuspid regurgitation    Past Surgical History:    Arthroplasty hip  Arthroscopy shoulder, bilateral  Bone marrow biopsy  EGD combined  Ablation AV node  Implant pacemaker  Irrigation and debridement lower extremity, combined  Joint replacement, bilateral knee  Cholecystectomy with cholangiograms  Lumpectomy breast  Phacoemulsification clear cornea with standard intraocular lens implant x2     Family History:    Hypertension  Diabetes  CAD    Social History:  Former Smoker - quit 2018  Negative for alcohol use.   Marital Status:   [5]     Review of Systems   Constitutional: Positive for fatigue.   HENT: Negative for nosebleeds.    Gastrointestinal: Negative for abdominal pain and blood in stool.   All other " "systems reviewed and are negative.      Physical Exam   First Vitals:  BP: 98/52  Heart Rate: 70  Temp: 97.8  F (36.6  C)  Resp: 14  Height: 165.1 cm (5' 5\")  Weight: 70.3 kg (155 lb)  SpO2: 91 %    Physical Exam  Eye:  Pupils are equal, round, and reactive.  Extraocular movements intact.    ENT:  No rhinorrhea.  Moist mucus membranes.  Normal tongue and tonsil.    Cardiac:  Regular rate and rhythm.  No murmurs, gallops, or rubs.    Pulmonary:  Clear to auscultation bilaterally.  No wheezes, rales, or rhonchi.    Abdomen:  Positive bowel sounds.  Abdomen is soft and non-distended, without focal tenderness.    Musculoskeletal:  Normal movement of all extremities without evidence for deficit.    Skin:  Patient is pale without rash.    Neurologic:  Non-focal exam without asymmetric weakness or numbness.     Psychiatric:  Normal affect with appropriate interaction with examiner.       Emergency Department Course     Laboratory:  BMP: Glucose 105 (H), Potassium: 3.3, Carbon Dioxide: 35 (H), Urea Nitrogen: 48 (H), Creatinine: 1.97 (H), GFR: 23 (L), o/w WNL  ABO/Rh: O+    Emergency Department Course:  Nursing notes and vitals reviewed.     1540  I performed an exam of the patient as documented above.     IV inserted. Medicine administered as documented above. Blood drawn. This was sent to the lab for further testing, results above.    1614  I consulted with Dr. Fairchild of the hospitalist services. They are in agreement to accept the patient for admission.    Findings and plan explained to the Patient who consents to admission. Discussed the patient with Dr. Fairchild, who will admit the patient to an observation bed for further monitoring, evaluation, and treatment.       Impression & Plan      Medical Decision Making:  This patient is sent from her primary clinic with concerns for symptomatic anemia.  The patient has a downward trending hemoglobin over the last month with her value at clinic being in the sevens.  She has had " issues with symptomatic anemia in the past, and notes that she feels very fatigued and tired consistent with her red blood cell count being low.  She has a history of some GI related bleeding, though there may be some bone marrow failure here as well.  She was typed and crossed for a unit of blood and I would admit her to the admitting team for continued management along with determination of the source or any treatment for her bleeding.  Questions were answered and she is comfortable with the plan for admission.    Diagnosis:    ICD-10-CM    1. Symptomatic anemia D64.9 Blood component   2. Anticoagulated Z79.01        Disposition:  Admitted to Dr. Devorah SCHULTZ, Paulette Houser, am serving as a scribe on 2/13/2019 at 3:51 PM to personally document services performed by No att. providers found based on my observations and the provider's statements to me.      Paulette Houser  2/13/2019    EMERGENCY DEPARTMENT       Trierweiler, Chad A, MD  02/14/19 0024

## 2019-02-14 NOTE — CONSULTS
GASTROENTEROLOGY CONSULTATION     Helene Gibbs   [address] [facility] [address]   83 year old female   Admission Date/Time: 2/13/2019   Encounter Date: 2/14/2019  Primary Care Provider: Neisha Esparza     Referring / Attending Physician: Dr. Fairchild   We were asked to see the patient in consultation by Dr. Fairchild for evaluation of anemia.     HPI: Helene Gibbs is a 83 year old female with type 2 diabetes, COPD with pulmonary hypertension, stage 3 chronic renal failure, coronary artery disease, pacemaker, and a-fib who is presented to [facility] 2/13 with anemia and weakness.   Her hemoglobin was 7.8 upon arrival to the hospital. She was given one unit of blood. Hgb 8.8 today. She is on Elliquis and Aspirin 81mg daily for atrial fibrillation. She denies overt melena or hematochezia. Reports a decreased appetite with early satiety and occasional dysphagia and regurgitation of food. She has a history of constipation and uses Milk of Magnesia daily with one soft to loose stool daily.    She was seen in consultation on 12/19/2018 regarding anemia (hemoglobin 6.6). Her iron studies were low with an iron level of 16, iron saturation of 4%.  Fecal occult blood testing is positive. She has a longstanding history of anemia with thorough workup in past.  Colonoscopy completed 10/2016 to th e cecum revealed 2 small polyps. Pathology was consistent with one tubular adenoma and one hyperplastic polyp. An upper endoscopy 8/2017 showed a normal esophagus, erythematous mucosa in the antrum, and a normal duodenum. Biopsies showed chronic gastritis, negative H. Pylori, negative celiac. She was not interested in endoscopic evaluation at that time. PPI with iron supplementation and avoidance of NSAIDS was recommended.   Past Medical History  Past Medical History:   Diagnosis Date     Anemia      Ankle arthritis      Arthritis      Back pain      Bell's palsy 9/08    left     Breast CA (H) 2004-    left lumpectomy,  radiation, -recurrence     Breast cancer (H) 2004    Left breast lumpectomy w LN disection, and radiation therapy     CKD (chronic kidney disease)     stage 3 baseline Cr 1.3     Colon polyps     colonoscopy-9/12-next due in 9/13     Congestive heart failure (H)      COPD (chronic obstructive pulmonary disease) (H)      Coronary artery disease      DM (diabetes mellitus) (H)      GERD (gastroesophageal reflux disease)      Hyperlipidemia      Hypertension      Lumbar spinal stenosis     use cane     Nicotine dependence      Obesity      Osteoporosis      Other chronic pain      Pacemaker      Permanent atrial fibrillation (H) 8/2008    S/P AV node ablation and pacemaker 10-25-12       Pleural effusion      Pulmonary hypertension (H)      PVD (peripheral vascular disease) (H)      Rectal stenosis      Tobacco abuse     current     Tricuspid regurgitation        Past Surgical History  Past Surgical History:   Procedure Laterality Date     ARTHROPLASTY HIP Left 10/20/2018    Procedure: LEFT HIP HERACLIO-ARTHROPLASTY ;  Surgeon: Ish Fish MD;  Location:  OR     ARTHROSCOPY SHOULDER RT/LT  1999, 2004    Bilateral, right then left     BONE MARROW BIOPSY, BONE SPECIMEN, NEEDLE/TROCAR N/A 8/29/2017    Procedure: BIOPSY BONE MARROW;  BONE MARROW BIOPSY;  Surgeon: Marino Cohen MD;  Location:  GI     C DEXA, BONE DENSITY, AXIAL SKEL       C MAMMOGRAM, SCREENING  1/2009     COLONOSCOPY N/A 10/7/2016    Procedure: COMBINED COLONOSCOPY, SINGLE OR MULTIPLE BIOPSY/POLYPECTOMY BY BIOPSY;  Surgeon: Cassandra Mccord MD;  Location:  GI     ESOPHAGOSCOPY, GASTROSCOPY, DUODENOSCOPY (EGD), COMBINED N/A 8/10/2017    Procedure: COMBINED ESOPHAGOSCOPY, GASTROSCOPY, DUODENOSCOPY (EGD), BIOPSY SINGLE OR MULTIPLE;  gastroscopy;  Surgeon: Helene Bustamante MD;  Location:  GI     H ABLATION AV NODE  10/2012     HC COLONOSCOPY THRU STOMA, DIAGNOSTIC  ? 2005     IMPLANT PACEMAKER  10/2012    [place] [id number], [id number]      IRRIGATION AND DEBRIDEMENT LOWER EXTREMITY, COMBINED Left 2018    Procedure: INCISION AND DRAINAGE OF LEFT HIP WITH EXCISION OF BONE FRAGMENT;  Surgeon: Ish Fish MD;  Location:  OR     JOINT REPLACEMTN, KNEE RT/LT      Joint Replacement knee bilateral     LAPAROSCOPIC CHOLECYSTECTOMY WITH CHOLANGIOGRAMS N/A 2015    Procedure: LAPAROSCOPIC CHOLECYSTECTOMY WITH CHOLANGIOGRAMS;  Surgeon: Ervin Amos MD;  Location:  OR     LUMPECTOMY BREAST      Left-     PHACOEMULSIFICATION CLEAR CORNEA WITH STANDARD INTRAOCULAR LENS IMPLANT Left 2015    Procedure: PHACOEMULSIFICATION CLEAR CORNEA WITH STANDARD INTRAOCULAR LENS IMPLANT;  Surgeon: Sai Obregon MD;  Location:  EC     PHACOEMULSIFICATION CLEAR CORNEA WITH TORIC INTRAOCULAR LENS IMPLANT Right 2017    Procedure: PHACOEMULSIFICATION CLEAR CORNEA WITH TORIC INTRAOCULAR LENS IMPLANT;  RIGHT EYE PHACOEMULSIFICATION CLEAR CORNEA WITH TORIC INTRAOCULAR LENS IMPLANT ;  Surgeon: Duc Richmond MD;  Location: [facility]       Family History  Family History   Problem Relation Age of Onset     Hypertension Mother      Diabetes Mother          at 83 yrs     C.A.D. Father          age 70s     Breast Cancer No family hx of      Colon Cancer No family hx of        Social History  Social History     Socioeconomic History     Marital status:      Spouse name: Not on file     Number of children: 0     Years of education: Not on file     Highest education level: Not on file   Social Needs     Financial resource strain: Not on file     Food insecurity - worry: Not on file     Food insecurity - inability: Not on file     Transportation needs - medical: Not on file     Transportation needs - non-medical: Not on file   Occupational History     Not on file   Tobacco Use     Smoking status: Former Smoker     Packs/day: 0.25     Years: 45.00     Pack years: 11.25     Types: Cigarettes     Last attempt to quit: 2018     Years  since quittin.7     Smokeless tobacco: Never Used   Substance and Sexual Activity     Alcohol use: No     Alcohol/week: 0.0 oz     Drug use: No     Sexual activity: Not Currently     Partners: Female   Other Topics Concern     Parent/sibling w/ CABG, MI or angioplasty before 65F 55M? Not Asked      Service Not Asked     Blood Transfusions Not Asked     Caffeine Concern Yes     Comment: 2 cups/day     Occupational Exposure Not Asked     Hobby Hazards Not Asked     Sleep Concern Yes     Stress Concern Yes     Weight Concern Yes     Comment: 15+ lb weight loss since hospitalization      Special Diet Yes     Comment: bland diet r/t cholecystectomy, low salt      Back Care Not Asked     Exercise No     Bike Helmet Not Asked     Seat Belt Yes     Self-Exams Not Asked   Social History Narrative    ,no childrenPOA- niece-Enedelia silvestre.  Lived in [place] and [place] worked in [employer], [employer].  Desires DNR/DNI.  (last updated 2018)        Medications  Prior to Admission medications    Medication Sig Start Date End Date Taking? Authorizing Provider   albuterol (PROAIR HFA, PROVENTIL HFA, VENTOLIN HFA) 108 (90 BASE) MCG/ACT inhaler Inhale 2 puffs into the lungs every 4 hours as needed for shortness of breath / dyspnea or wheezing 2/10/15  Yes Neisha Esparza MD   apixaban ANTICOAGULANT (ELIQUIS) 2.5 MG tablet Take 1 tablet (2.5 mg) by mouth 2 times daily 18 Yes Azul Crow PA-C   denosumab (PROLIA) 60 MG/ML SOLN injection Inject 1 mL (60 mg) Subcutaneous every 6 months 18  Yes Hector Moran MD   glipiZIDE (GLUCOTROL) 5 MG tablet Take 1 tablet (5 mg) by mouth every morning (before breakfast) 18 Yes Neisha Esparza MD   hydrocortisone (CORTAID) 1 % external cream Apply topically 3 times daily as needed for itching   Yes Unknown, Entered By History   hydrOXYzine (VISTARIL) 25 MG capsule Take 1 capsule (25 mg) by mouth 2 times daily as needed for  "itching 4/25/17  Yes Neisha Esparza MD   hypromellose (ARTIFICIAL TEARS) 0.5 % SOLN ophthalmic solution Place 1 drop into both eyes every hour as needed for dry eyes   Yes Unknown, Entered By History   metoprolol tartrate (LOPRESSOR) 25 MG tablet Take 1 tablet (25 mg) by mouth 2 times daily 5/14/18  Yes Neisha Esparza MD   ondansetron (ZOFRAN ODT) 4 MG ODT tab Take 1 tablet (4 mg) by mouth every 6 hours as needed for nausea 10/16/17  Yes Neisha Esparza MD   pregabalin (LYRICA) 75 MG capsule Take 1 capsule (75 mg) by mouth 2 times daily 12/27/18  Yes Ervin Lares MD   ranitidine (ZANTAC) 150 MG tablet TAKE ONE TABLET BY MOUTH TWO TIMES A DAY 3/21/18  Yes Neisha Esparza MD   simvastatin (ZOCOR) 10 MG tablet Take 1 tablet (10 mg) by mouth At Bedtime 12/4/18  Yes Neisha Esparza MD   tiotropium (SPIRIVA) 18 MCG inhaled capsule Inhale 18 mcg into the lungs daily   Yes Unknown, Entered By History   torsemide (DEMADEX) 20 MG tablet TAKE 1 TABLET BY MOUTH EVERY DAY 1/10/19  Yes Neisha Esparza MD   traMADol (ULTRAM) 50 MG tablet Take 1 tablet (50 mg) by mouth every 6 hours as needed for severe pain 2/12/19 2/15/19 Yes Foreign Liang MD   ACCU-CHEK SMARTVIEW test strip USE 1 STRIP BY IN VITRO ROUTE 2 TIMES DAILY OR AS DIRECTED 11/8/18   Neisha Esparza MD   ACE/ARB/ARNI NOT PRESCRIBED (INTENTIONAL) Please choose reason not prescribed, below 12/19/18   Neisha Esparza MD   blood glucose monitoring (NO BRAND SPECIFIED) meter device kit Accucheck Mariza meter; verified with pharmacist 2/7/19   Neisha Esparza MD       Allergies:  Ambien [zolpidem tartrate]; Penicillins; Definity; Sulfa drugs; Cymbalta; and Fluconazole    ROS: A ten point review of systems was obtained and negative other than the symptoms noted above in the HPI.     Physical Exam:   /65 (BP Location: Left arm)   Pulse 69   Temp 97.4  F (36.3  C) (Oral)   Resp 16   Ht 1.651 m (5' 5\")   Wt 70.8 kg (156 lb)   LMP  (LMP Unknown) "   SpO2 93%   BMI 25.96 kg/m     Constitutional: elderly, alert, no acute distress  Cardiovascular: regular rate and rhythm, no murmurs,rubs or gallops  Respiratory: clear to auscultation bilaterally  Psychiatric: normal pleasant affect  Head: atraumatic, normocephalic  Neck: supple, no thyromegaly  ENT: mucous membranes are moist, no oral lesions are noted  Abdomen: soft, non-tender, non-distended, normally active bowel sound. No rebound tenderness or guarding  Neuro: Neurologically intact grossly  Skin: bruising noted on arms and legs     ADDITIONAL COMMENTS:   I reviewed the patient's new clinical lab test results.   Recent Labs   Lab Test 02/14/19  0640 02/13/19  1423 02/13/19  1247 02/11/19  2350  10/19/18  2156  01/05/18  0800 01/04/18  1101   WBC 4.3  --  5.0 5.0   < > 9.7   < > 7.0 6.5   HGB 8.8* 8.1* 7.8* 8.4*   < > 9.3*   < > 11.8 12.6   MCV 84  --  87 85   < > 93   < > 93 92     --  195 177   < > 260   < > 215 223   INR  --   --   --   --   --  1.54*  --  1.00 0.97    < > = values in this interval not displayed.      Recent Labs   Lab Test 02/14/19  0640 02/13/19  1423 02/13/19  1247 02/11/19  2350   NA  --  140 138 138   POTASSIUM 3.8 3.3* 4.0 3.7   CHLORIDE  --  99 99 97   CO2  --  35* 34* 35*   BUN  --  48* 49* 49*   CR  --  1.97* 1.98* 2.12*   ANIONGAP  --  6 5 6   GERARD  --  9.3 9.5 9.0      Recent Labs   Lab Test 12/25/18  0612 12/24/18  0746 12/19/18  2230 12/19/18  2053 12/19/18  1133 11/05/18  10/22/18  2300  10/19/18  2300  07/18/18  1200  07/16/18  1718  05/22/18  1639   ALBUMIN 2.4* 2.5*  --  2.8* 3.1* 2.8*   < >  --    < >  --    < > 3.5  --  3.5  --  4.3   BILITOTAL  --   --   --  0.6 0.6 0.5   < >  --    < >  --    < > 1.0  --  1.1  --  0.7   ALT  --   --   --  10 9 21   < >  --    < >  --    < > 17  --  18  --  24   AST  --   --   --  6 6 17   < >  --    < >  --    < > 13  --  14  --  27   ALKPHOS  --   --   --  82 74 84   < >  --    < >  --    < > 79  --  78  --  82   PROTEIN  --    --  30*  --   --   --   --  30*  --  10*   < >  --    < >  --    < >  --    LIPASE  --   --   --   --   --   --   --   --   --   --   --  78  --  62*  --  120    < > = values in this interval not displayed.      I reviewed the patient's new imaging results.     Assessment:  Helene Gibbs is a 83 year old female with type 2 diabetes, COPD with pulmonary hypertension, stage 3 chronic renal failure, coronary artery disease, pacemaker, and a-fib on anticoagulation who is presented to [facility] 2/13 with anemia and weakness. She has a history of chronic anemia and is now desiring endoscopic workup. Differentials include peptic ulcer disease, AVM, polyps/malignancy.     Plan:   -Clears today  -Prep this afternoon   -PPI   -Avoid NSAIDS  -EGD/Colonoscopy tomorrow   -Consider capsule study if EGD/Colon negative     I discussed the patient's findings and plan with Dr. Hayden who will also independently see and examine the patient.     Margaret Branch CNP  [facility]  Cell: [phone]  Monday through Friday [id number]  Office: [phone]

## 2019-02-14 NOTE — H&P
Admitted:     02/13/2019      PRIMARY CARE PHYSICIAN:  Neisha Esparza MD      CHIEF COMPLAINT:  Abnormal labs.      HISTORY OF PRESENT ILLNESS:  Helene Gibbs is an 83-year-old female with prior history of anemia, prior history of EGD back in 2017, which showed erythematous gastropathy, who has atrial fibrillation for which she is on anticoagulation with Eliquis.  She has known coronary artery disease, pacemaker, and hypertension.  The patient presents to [facility] from her clinic because of abnormal labs, specifically low hemoglobin.  The patient denies any black or bloody stools.  She has been followed in clinic and has noted that her hemoglobin had been down trending.  She had recent hip surgery in December.  She has had prior transfusions.  She has been more weak and short of breath the last couple of days.  The patient denies any abdominal pain.  She was seen in clinic and noted her hemoglobin was down to 7.8.  The patient was sent to [facility] for further assessment.      In the Emergency Department patient was seen and repeat hemoglobin was 8.1.  Other blood work revealed potassium 3.3.  The patient has chronic kidney disease and creatinine is at baseline at 1.97.  The patient was started on packed red blood cell transfusion.  The patient is being admitted under observation status.      PAST MEDICAL HISTORY:   1.  Anemia.   2.  Bell palsy.    3.  Positive chronic kidney disease.     4.  History of breast cancer.     5.  Colon polyps.     6.  CHF.     7.  COPD.     8.  Coronary artery disease.      9.  Type 2 diabetes.   10.  GERD.   11.  Hyperlipidemia.   12.  Hypertension.     13.  Osteoporosis.     14.  Atrial fibrillation with pacemaker.   15.  Pleural effusion.   16.  Pulmonary hypertension.     17.  Peripheral arterial disease.   18.  Tricuspid regurgitation.      PAST SURGICAL HISTORY:   1.  Bilateral hip arthroplasties.     2.  Bilateral shoulder arthroscopies.      3.  Bone marrow biopsy.     4.  History of EGD.   5.  AV node ablation.     6.  Pacemaker implantation.   7.  I and D of her lower extremities.   8.  Cholecystectomy.     9.  Left breast lumpectomy.   10.  Cataract surgery.      FAMILY HISTORY:  Positive for hypertension, diabetes and coronary artery disease.      SOCIAL HISTORY:  Former smoker, quit in 2018.  No alcohol.  , lives independently.      ALLERGIES:     1.  AMBIEN.     2.  PENICILLIN.     3.  DEFINITY.     4.  SULFA.     5.  CYMBALTA.     6.  FLUCONAZOLE.      CURRENT MEDICATIONS:   1.  Albuterol metered dose inhaler 2 puffs every 4 hours.     2.  Apixaban 2.5 mg b.i.d.    3.  Prolia 60 mg subQ every 6 months.   4.  Glipizide 5 mg daily.     5.  Cortaid 1% external cream, apply as needed for itching.     6.  Hydroxyzine 25 mg b.i.d.   7.  Artificial tears 1 drop both eyes every hour as needed.   8.  Metoprolol 25 mg b.i.d.   9.  Zofran 4 mg every 6 hours p.r.n. for nausea.     10.  Lyrica 75 mg twice a day.     11.  Ranitidine 150 mg b.i.d.   12.  Zocor 10 mg at bedtime.   13.  Spiriva 18 mcg once a day.   14.  Torsemide 20 mg every day.   15.  Ultram 50 mg q.6h.      REVIEW OF SYSTEMS:  Ten-point systems reviewed and as dictated in the history of present illness.      PHYSICAL EXAMINATION:   VITAL SIGNS:  Temperature 97.8, heart rate 70, respiration 14, blood pressure 90/52, saturations 98% on room air.   GENERAL:  The patient is an 83-year-old  female.  She is pleasant, cooperative, in no distress.   HEENT:  She has pallor.  Pupils are equal.  Sclerae are anicteric.  Oropharynx shows mucous membranes to be moist.   NECK:  No cervical lymphadenopathy, no JVP elevation.   LUNGS:  Clear to auscultation.   CARDIOVASCULAR:  S1, S2, regular rate and rhythm, no murmurs.   ABDOMEN:  Soft, nontender, normoactive bowel sounds.   EXTREMITIES:  No edema.   SKIN:  Warm, dry, well perfused.   NEUROLOGIC:  She is grossly nonfocal, but diffusely  weak.   PSYCHIATRIC:  Affect and mood are normal.        LABORATORY AND STUDIES:  As dictated in the history of present illness.      ASSESSMENT:  Helene Bobo is an 83-year-old who presents with progressive anemia in the setting of stage IV chronic kidney disease who likely has worsening symptoms due to her anemia.      PLAN:   1.  Symptomatic anemia.  The patient will receive 1 unit packed red blood cells.  We will try to keep her hemoglobin up closer to 9.  She does have coronary artery disease.  The patient will also be n.p.o.  We will obtain a formal GI consultation to see whether they would consider doing an endoscopy.  She did have endoscopy back in 08/2017 where it showed erythematous mucosa in the antrum that was biopsied.  We will hold the patient's anticoagulation.  Alternatively, this could be a non-GI bleed and may be a result of her worsening symptoms of her chronic kidney disease.   2.  Hypokalemia.  This certainly can make her feel weak.  The patient will receive potassium replacement protocol and we will give her IV fluids with potassium supplementation.   3.  History of paroxysmal atrial fibrillation.  The patient is currently in sinus rhythm.  We will continue patient on metoprolol, but hold her Eliquis.   4.  Diabetes.  We are going to hold the patient's glipizide and do Accu-Cheks and cover her with sliding scale insulin during her hospitalization.   5.  History of Bell palsy.  We will keep the patient on her pain medications.   6.  Hyperlipidemia.  We will continue the patient on Zocor.   7.  History of congestive heart failure.  We will continue the patient on her torsemide.   8.  Deep venous thrombosis prophylaxis.  The patient has been anticoagulated with Eliquis.   9.  CODE STATUS:  DNR/DNI.   10.  Observation status.         DEONDRE WADE MD             D: 02/14/2019   T: 02/14/2019   MT: JOSIE      Name:     HELENE BOBO   MRN:      [id number]        Account:      [id number]    :      1935        Admitted:     2019                   Document: [id number]       cc: Neisha Esparza MD

## 2019-02-14 NOTE — PLAN OF CARE
Patient is A&Ox4. VSS on RA. Up with 1, gait belt and walker. Reg diet well tolerated. NPO at midnight. Hgb 8.1, 1 unit of blood given in ER.  , 208, insulin given per scale. GI following. Will continue to monitor.

## 2019-02-14 NOTE — PROGRESS NOTES
[facility]    Medicine Progress Note - Hospitalist Service       Date of Admission:  2/13/2019  Assessment & Plan   Helene Gibbs is an 83-year-old who presents with progressive anemia in the setting of stage IV chronic kidney disease who likely has worsening symptoms due to her anemia.        1.  Symptomatic anemia.    - s/p 1 unit(s) PRBC  - hgb 7.8 up to 8.8 this morning  - PPI IV continued  - GI following - planning prep this afternoon, EGD/Colonoscopy tomorrow  - Hold PTA anticoagulationThe patient will receive 1 unit packed red blood cells.    - could be a non-GI bleed and may be a result of her worsening symptoms of her chronic kidney disease.     2.  Hypokalemia.    - Certainly can make her feel weak.    - Supplemented     3.  History of paroxysmal atrial fibrillation.    - in SR, holding apixaban, continue metoprolol    4.  Diabetes.    - holding PTA glipizide and do Accu-Cheks and cover her with sliding scale insulin during her hospitalization.     5.  History of Bell palsy.  We will keep the patient on her pain medications.     6.  Hyperlipidemia.  We will continue the patient on Zocor.     7.  History of congestive heart failure.  We will continue the patient on her torsemide.       Diet: NPO for Medical/Clinical Reasons Except for: Meds    DVT Prophylaxis: Pneumatic Compression Devices  Montano Catheter: not present  Code Status: DNR/DNI      Disposition Plan   Expected discharge: Pending GI eval tomorrow.    Entered: Savage Mandel MD 02/14/2019, 8:18 AM       The patient's care was discussed with the Bedside Nurse and Patient.    Savage Mandel MD  Hospitalist Service  [facility]    ______________________________________________________________________    Interval History   No new complaints or events.     Data reviewed today: I reviewed all medications, new labs and imaging results over the last 24 hours. I personally reviewed no images or EKG's  today.    Physical Exam   Vital Signs: Temp: 97.4  F (36.3  C) Temp src: Oral BP: 111/65 Pulse: 69 Heart Rate: 70 Resp: 16 SpO2: 93 % O2 Device: None (Room air) Oxygen Delivery: 2 LPM  Weight: 156 lbs 0 oz  General Appearance: Nad, pleasant  Respiratory: ctabl  Cardiovascular: s1s2 heard, reg rate, no pitting edema  GI: s, nt, full but nd (says unchanged in size), +bs  Other: Aaox3, no focal deficits, moving all 4, eomi    Data   Recent Labs   Lab 02/14/19  0640 02/13/19  1423 02/13/19  1247 02/11/19  2350   WBC 4.3  --  5.0 5.0   HGB 8.8* 8.1* 7.8* 8.4*   MCV 84  --  87 85     --  195 177   NA  --  140 138 138   POTASSIUM 3.8 3.3* 4.0 3.7   CHLORIDE  --  99 99 97   CO2  --  35* 34* 35*   BUN  --  48* 49* 49*   CR  --  1.97* 1.98* 2.12*   ANIONGAP  --  6 5 6   GERARD  --  9.3 9.5 9.0   GLC  --  105* 153* 166*     No results found for this or any previous visit (from the past 24 hour(s)).

## 2019-02-14 NOTE — CONSULTS
Care Transition Initial Assessment - RN  Met with: Patient.  DATA   Active Problems:    Anemia       Cognitive Status: alert and oriented.   Contact information and PCP information verified: Yes   Insurance concerns: No Insurance issues identified  ASSESSMENT  Patient currently receives the following services:  Home Health care     Identified issues/concerns regarding health management:    Discussed observation status with patient and she verbalized understanding.Patient stating she  resides at [facility] in [place]. Patient hires the assisted living staff's help with her stockings each morning and each evening. Patient said that she purchases her meals in the building. Patient is currently ambulating with a walker.   Patient has an alert system that she wears. patient  has the support of her niece, and currently has home care services through [place] Patient intends to return to her home with resumption of home care services.  PLAN  Financial costs for the patient include None .  Patient given options and choices for discharge yes .  Patient/family is agreeable to the plan yes Patient anticipates discharging to home     Care  (CTS) will continue to follow as needed.

## 2019-02-14 NOTE — PLAN OF CARE
"Patient is A&Ox4. Agitated with cares, complains of lower back pain, pt wants Tramadol more often and refuses other interventions, saying \"Tylenol does not work for me\". Tramadol x1, Tylenol x 1, hot/cold applied as well. VSS on RA. LS diminished. Up with 1, gait belt and walker. NPO at midnight. Hgb 8.1, 1 unit of blood given in ER.  /109. IVF infusing at 75 ml/hr. K 3.3, replaced, recheck scheduled for 7 am. GI consulted. Will continue to monitor.     "

## 2019-02-15 NOTE — PLAN OF CARE
Still consuming colon prep but nearing the end.  Recently started to pass bowel movements; will monitor for progress to clear stools.  Up to commode or bathroom, SBA/walker.  Zofran for nausea during prep effective.  Tylenol for headache.  NPO after midnight.  VSS.  .

## 2019-02-15 NOTE — ANESTHESIA POSTPROCEDURE EVALUATION
Patient: Heleen Gibbs    Procedure(s):  COMBINED ESOPHAGOSCOPY, GASTROSCOPY, DUODENOSCOPY (EGD)  COLONOSCOPY    Diagnosis:ANEMIA  Diagnosis Additional Information: No value filed.    Anesthesia Type:  MAC    Note:  Anesthesia Post Evaluation    Patient location during evaluation: PACU  Patient participation: Able to fully participate in evaluation  Level of consciousness: awake  Pain management: adequate  Airway patency: patent  Cardiovascular status: acceptable  Respiratory status: acceptable  Hydration status: acceptable  PONV: none     Anesthetic complications: None          Last vitals:  Vitals:    02/15/19 1358 02/15/19 1359 02/15/19 1531   BP:  114/64 99/63   Pulse:      Resp:  18 18   Temp:   36.4  C (97.5  F)   SpO2: 90% 96% 95%         Electronically Signed By: Ranulfo Barger MD  February 15, 2019  4:16 PM

## 2019-02-15 NOTE — PLAN OF CARE
Pt alert and oriented. Up with 1/walker and belt. Tolerated clear liquids. Pain  in low back better after ultram when pt at rest but increases with activity. Pt prepping for colonoscopy tomorrow.

## 2019-02-15 NOTE — PLAN OF CARE
Patient is A+OX4, VSS. Placed on 2L overnight, on RA at baseline. Consuming bowel prep, having trouble finishing due to nausea. Three watery stools overnight, last one came out clear. Back pain controlled with PRN tramadol. LR infusing at 75. Up with SBA using walker to bathroom. Blood sugar 115. Plan for possible EGD/colonoscopy today to look for cause of bleeding (tenatively scheduled at 1145)

## 2019-02-15 NOTE — PROGRESS NOTES
Gastroenterology Progress Note     Stools are clear. Confirmed with pt/RN. Will proceed with EGD/Colonocopy late morning.    Margaret Branch, CNP  [phone]  [facility]

## 2019-02-15 NOTE — PROGRESS NOTES
Hospitalist update    Patient is s/p egd and colonoscopy under MAC anesthesia - no complications during procedures but patient has had hypotension down to the 70s systolic range. Also requiring supplemental oxygen to maintain saturations. She has received about 2 L IVF. Close monitoring over the evening hours is recommended - currently SBP in 90s and she is mentating properly. She is at risk for pulmonary edema given the IVF administered - may need diuresis tonight as long as BP affords. Discussed with RN staff.     Savage Mandel MD [facility]  Hospitalist

## 2019-02-15 NOTE — ANESTHESIA CARE TRANSFER NOTE
Patient: Helene Gibbs    Procedure(s):  COMBINED ESOPHAGOSCOPY, GASTROSCOPY, DUODENOSCOPY (EGD)  COLONOSCOPY    Diagnosis: ANEMIA  Diagnosis Additional Information: No value filed.    Anesthesia Type:   MAC     Note:  Airway :Nasal Cannula  Patient transferred to:PACU  Comments: At end of procedure, spontaneous respirations, patient alert to voice, able to follow commands. Oxygen via nasal cannula at 2 liters per minute to PACU. Oxygen tubing connected to wall O2 in PACU, SpO2, NiBP, and EKG monitors and alarms on and functioning, Brenna Hugger warmer connected to patient gown, report on patient's clinical status given to PACU RN, RN questions answered.Handoff Report: Identifed the Patient, Identified the Reponsible Provider, Reviewed the pertinent medical history, Discussed the surgical course, Reviewed Intra-OP anesthesia mangement and issues during anesthesia, Set expectations for post-procedure period and Allowed opportunity for questions and acknowledgement of understanding      Vitals: (Last set prior to Anesthesia Care Transfer)    CRNA VITALS  2/15/2019 1152 - 2/15/2019 1230      2/15/2019             NIBP:  95/50    Pulse:  68    NIBP Mean:  70    SpO2:  100 %    Resp Rate (set):  10                Electronically Signed By: ALLEN Marks CRNA  February 15, 2019  12:30 PM

## 2019-02-15 NOTE — PLAN OF CARE
A&OX4, calm and coop. Up with SBA with GB/walker, amb to BR x3. Refuses repos in bed side to side r/t chronic back pain, large bruise on coccyx from previous fall. VSS x BP soft following procedure. Denies pain. NPO for EGD/colonoscopy, jamie Mod carb diet. BGM 86,115,155. Hgb 9.7. O2 at 2L following procedure, RA sats 90%, using ear probe for sats. IVF's, voiding adeq.

## 2019-02-15 NOTE — ANESTHESIA PREPROCEDURE EVALUATION
Procedure: Procedure(s):  COMBINED ESOPHAGOSCOPY, GASTROSCOPY, DUODENOSCOPY (EGD)  COLONOSCOPY  Preop diagnosis: ANEMIA    Allergies   Allergen Reactions     Ambien [Zolpidem Tartrate] Visual Disturbance     Hallucinations and fell out of bed at night.     Penicillins Hives     Definity      Caused a syncopal episode.     Sulfa Drugs Itching     Cymbalta Rash     Fluconazole Rash     Tolerates miconazole suppositories     Past Medical History:   Diagnosis Date     Anemia      Ankle arthritis      Arthritis      Back pain      Bell's palsy 9/08    left     Breast CA (H) 2004-    left lumpectomy, radiation, -recurrence     Breast cancer (H) 2004    Left breast lumpectomy w LN disection, and radiation therapy     CKD (chronic kidney disease)     stage 3 baseline Cr 1.3     Colon polyps     colonoscopy-9/12-next due in 9/13     Congestive heart failure (H)      COPD (chronic obstructive pulmonary disease) (H)      Coronary artery disease      DM (diabetes mellitus) (H)      GERD (gastroesophageal reflux disease)      Hyperlipidemia      Hypertension      Lumbar spinal stenosis     use cane     Nicotine dependence      Obesity      Osteoporosis      Other chronic pain      Pacemaker      Permanent atrial fibrillation (H) 8/2008    S/P AV node ablation and pacemaker 10-25-12       Pleural effusion      Pulmonary hypertension (H)      PVD (peripheral vascular disease) (H)      Rectal stenosis      Tobacco abuse     current     Tricuspid regurgitation      Past Surgical History:   Procedure Laterality Date     ARTHROPLASTY HIP Left 10/20/2018    Procedure: LEFT HIP HERACLIO-ARTHROPLASTY ;  Surgeon: Ish Fish MD;  Location:  OR     ARTHROSCOPY SHOULDER RT/LT  1999, 2004    Bilateral, right then left     BONE MARROW BIOPSY, BONE SPECIMEN, NEEDLE/TROCAR N/A 8/29/2017    Procedure: BIOPSY BONE MARROW;  BONE MARROW BIOPSY;  Surgeon: Marino Cohen MD;  Location:  GI     C DEXA, BONE DENSITY, AXIAL SKEL       C  MAMMOGRAM, SCREENING  2009     COLONOSCOPY N/A 10/7/2016    Procedure: COMBINED COLONOSCOPY, SINGLE OR MULTIPLE BIOPSY/POLYPECTOMY BY BIOPSY;  Surgeon: Cassandra Mccord MD;  Location:  GI     ESOPHAGOSCOPY, GASTROSCOPY, DUODENOSCOPY (EGD), COMBINED N/A 8/10/2017    Procedure: COMBINED ESOPHAGOSCOPY, GASTROSCOPY, DUODENOSCOPY (EGD), BIOPSY SINGLE OR MULTIPLE;  gastroscopy;  Surgeon: Helene Bustamante MD;  Location:  GI     H ABLATION AV NODE  10/2012     HC COLONOSCOPY THRU STOMA, DIAGNOSTIC  ?      IMPLANT PACEMAKER  10/2012    [place] [id number], [id number]     IRRIGATION AND DEBRIDEMENT LOWER EXTREMITY, COMBINED Left 2018    Procedure: INCISION AND DRAINAGE OF LEFT HIP WITH EXCISION OF BONE FRAGMENT;  Surgeon: Ish Fish MD;  Location:  OR     JOINT REPLACEMTN, KNEE RT/LT      Joint Replacement knee bilateral     LAPAROSCOPIC CHOLECYSTECTOMY WITH CHOLANGIOGRAMS N/A 2015    Procedure: LAPAROSCOPIC CHOLECYSTECTOMY WITH CHOLANGIOGRAMS;  Surgeon: Ervin Amos MD;  Location:  OR     LUMPECTOMY BREAST      Left-     PHACOEMULSIFICATION CLEAR CORNEA WITH STANDARD INTRAOCULAR LENS IMPLANT Left 2015    Procedure: PHACOEMULSIFICATION CLEAR CORNEA WITH STANDARD INTRAOCULAR LENS IMPLANT;  Surgeon: Sai Obregon MD;  Location: [facility]     PHACOEMULSIFICATION CLEAR CORNEA WITH TORIC INTRAOCULAR LENS IMPLANT Right 2017    Procedure: PHACOEMULSIFICATION CLEAR CORNEA WITH TORIC INTRAOCULAR LENS IMPLANT;  RIGHT EYE PHACOEMULSIFICATION CLEAR CORNEA WITH TORIC INTRAOCULAR LENS IMPLANT ;  Surgeon: Duc Richmond MD;  Location: [facility]     Social History     Tobacco Use     Smoking status: Former Smoker     Packs/day: 0.25     Years: 45.00     Pack years: 11.25     Types: Cigarettes     Last attempt to quit: 2018     Years since quittin.7     Smokeless tobacco: Never Used   Substance Use Topics     Alcohol use: No     Alcohol/week: 0.0 oz     Prior to Admission  medications    Medication Sig Start Date End Date Taking? Authorizing Provider   albuterol (PROAIR HFA, PROVENTIL HFA, VENTOLIN HFA) 108 (90 BASE) MCG/ACT inhaler Inhale 2 puffs into the lungs every 4 hours as needed for shortness of breath / dyspnea or wheezing 2/10/15  Yes Neisha Esparza MD   apixaban ANTICOAGULANT (ELIQUIS) 2.5 MG tablet Take 1 tablet (2.5 mg) by mouth 2 times daily 12/24/18 2/13/19 Yes Azul Crow PA-C   denosumab (PROLIA) 60 MG/ML SOLN injection Inject 1 mL (60 mg) Subcutaneous every 6 months 11/22/18  Yes Hector Moran MD   glipiZIDE (GLUCOTROL) 5 MG tablet Take 1 tablet (5 mg) by mouth every morning (before breakfast) 12/27/18 12/27/19 Yes Neisha Esparza MD   hydrocortisone (CORTAID) 1 % external cream Apply topically 3 times daily as needed for itching   Yes Unknown, Entered By History   hydrOXYzine (VISTARIL) 25 MG capsule Take 1 capsule (25 mg) by mouth 2 times daily as needed for itching 4/25/17  Yes Neisha Esparza MD   hypromellose (ARTIFICIAL TEARS) 0.5 % SOLN ophthalmic solution Place 1 drop into both eyes every hour as needed for dry eyes   Yes Unknown, Entered By History   metoprolol tartrate (LOPRESSOR) 25 MG tablet Take 1 tablet (25 mg) by mouth 2 times daily 5/14/18  Yes Neisha Esparza MD   ondansetron (ZOFRAN ODT) 4 MG ODT tab Take 1 tablet (4 mg) by mouth every 6 hours as needed for nausea 10/16/17  Yes Neisha Esparza MD   pregabalin (LYRICA) 75 MG capsule Take 1 capsule (75 mg) by mouth 2 times daily 12/27/18  Yes Ervin Lares MD   ranitidine (ZANTAC) 150 MG tablet TAKE ONE TABLET BY MOUTH TWO TIMES A DAY 3/21/18  Yes Neisha Esparza MD   simvastatin (ZOCOR) 10 MG tablet Take 1 tablet (10 mg) by mouth At Bedtime 12/4/18  Yes Neisha Esparza MD   tiotropium (SPIRIVA) 18 MCG inhaled capsule Inhale 18 mcg into the lungs daily   Yes Unknown, Entered By History   torsemide (DEMADEX) 20 MG tablet TAKE 1 TABLET BY MOUTH EVERY DAY 1/10/19  Yes Vilma  Neisha MINAYA MD   traMADol (ULTRAM) 50 MG tablet Take 1 tablet (50 mg) by mouth every 6 hours as needed for severe pain 2/12/19 2/15/19 Yes Foreign Liang MD   ACCU-CHEK SMARTVIEW test strip USE 1 STRIP BY IN VITRO ROUTE 2 TIMES DAILY OR AS DIRECTED 11/8/18   Neisha Esparza MD   ACE/ARB/ARNI NOT PRESCRIBED (INTENTIONAL) Please choose reason not prescribed, below 12/19/18   Neisha Esparza MD   blood glucose monitoring (NO BRAND SPECIFIED) meter device kit Accucheck Mariza meter; verified with pharmacist 2/7/19   Neisha Esparza MD     Current Facility-Administered Medications Ordered in Epic   Medication Dose Route Frequency Last Rate Last Dose     acetaminophen (TYLENOL) Suppository 650 mg  650 mg Rectal Q4H PRN         acetaminophen (TYLENOL) tablet 650 mg  650 mg Oral Q4H PRN   650 mg at 02/14/19 2036     albuterol (PROAIR HFA/PROVENTIL HFA/VENTOLIN HFA) 108 (90 Base) MCG/ACT inhaler 2 puff  2 puff Inhalation Q4H PRN         glucose gel 15-30 g  15-30 g Oral Q15 Min PRN        Or     dextrose 50 % injection 25-50 mL  25-50 mL Intravenous Q15 Min PRN   25 mL at 02/15/19 0839    Or     glucagon injection 1 mg  1 mg Subcutaneous Q15 Min PRN         hydrOXYzine (VISTARIL) capsule 25 mg  25 mg Oral BID PRN   25 mg at 02/14/19 1844     hypromellose (ARTIFICIAL TEARS) 0.5 % ophthalmic solution 1 drop  1 drop Both Eyes Q1H PRN         insulin aspart (NovoLOG) inj (RAPID ACTING)  1-7 Units Subcutaneous TID AC         insulin aspart (NovoLOG) inj (RAPID ACTING)  1-5 Units Subcutaneous At Bedtime   1 Units at 02/13/19 2112     lactated ringers infusion   Intravenous Continuous 75 mL/hr at 02/14/19 2242       lidocaine (LMX4) cream   Topical Q1H PRN         lidocaine 1 % 0.1-1 mL  0.1-1 mL Other Q1H PRN         magnesium sulfate 4 g in 100 mL sterile water (premade)  4 g Intravenous Q4H PRN         May continue current IV fluids if patient has IV fluids infusing.   Does not apply Continuous PRN         May take regular  AM medications except those listed below   Does not apply Continuous PRN         melatonin tablet 1 mg  1 mg Oral At Bedtime PRN         metoprolol tartrate (LOPRESSOR) tablet 25 mg  25 mg Oral BID   25 mg at 02/15/19 0847     naloxone (NARCAN) injection 0.1-0.4 mg  0.1-0.4 mg Intravenous Q2 Min PRN         ondansetron (ZOFRAN-ODT) ODT tab 4 mg  4 mg Oral Q6H PRN        Or     ondansetron (ZOFRAN) injection 4 mg  4 mg Intravenous Q6H PRN   4 mg at 02/14/19 2004     potassium chloride (KLOR-CON) Packet 20-40 mEq  20-40 mEq Oral or Feeding Tube Q2H PRN         potassium chloride 10 mEq in 100 mL intermittent infusion with 10 mg lidocaine  10 mEq Intravenous Q1H PRN         potassium chloride 10 mEq in 100 mL sterile water intermittent infusion (premix)  10 mEq Intravenous Q1H PRN         potassium chloride ER (K-DUR/KLOR-CON M) CR tablet 20-40 mEq  20-40 mEq Oral Q2H PRN   20 mEq at 02/14/19 0239     pregabalin (LYRICA) capsule 75 mg  75 mg Oral BID   75 mg at 02/15/19 0847     ranitidine (ZANTAC) tablet 75 mg  75 mg Oral BID   75 mg at 02/15/19 0847     simvastatin (ZOCOR) tablet 10 mg  10 mg Oral At Bedtime   10 mg at 02/14/19 2142     sodium chloride (PF) 0.9% PF flush 3 mL  3 mL Intracatheter q1 min prn         sodium chloride (PF) 0.9% PF flush 3 mL  3 mL Intracatheter Q8H         sodium chloride (PF) 0.9% PF flush 3 mL  3 mL Intracatheter q1 min prn         sodium chloride (PF) 0.9% PF flush 3 mL  3 mL Intracatheter Q8H   3 mL at 02/13/19 2318     torsemide (DEMADEX) tablet 20 mg  20 mg Oral Daily   20 mg at 02/15/19 0847     traMADol (ULTRAM) tablet 50 mg  50 mg Oral Q6H PRN   50 mg at 02/15/19 0020     umeclidinium (INCRUSE ELLIPTA) 62.5 MCG/INH inhaler 1 puff  1 puff Inhalation Daily         No current Epic-ordered outpatient medications on file.       lactated ringers 75 mL/hr at 02/14/19 2242     - MEDICATION INSTRUCTIONS -       - MEDICATION INSTRUCTIONS -       Wt Readings from Last 1 Encounters:    02/14/19 70.8 kg (156 lb)     Temp Readings from Last 1 Encounters:   02/15/19 36.3  C (97.4  F) (Oral)     BP Readings from Last 6 Encounters:   02/15/19 126/72   02/12/19 127/69   01/03/19 104/64   12/26/18 132/65   12/26/18 119/48   12/19/18 104/56     Pulse Readings from Last 4 Encounters:   02/13/19 69   02/12/19 71   01/03/19 69   12/26/18 70     Resp Readings from Last 1 Encounters:   02/15/19 18   @LASTSAO2(1)@  Recent Labs   Lab Test 02/14/19  0640 02/13/19  1423 02/13/19  1247   NA  --  140 138   POTASSIUM 3.8 3.3* 4.0   CHLORIDE  --  99 99   CO2  --  35* 34*   ANIONGAP  --  6 5   GLC  --  105* 153*   BUN  --  48* 49*   CR  --  1.97* 1.98*   GERARD  --  9.3 9.5     Recent Labs   Lab Test 12/19/18  2053 12/19/18  1133  07/18/18  1200 07/16/18  1718   AST 6 6   < > 13 14   ALT 10 9   < > 17 18   ALKPHOS 82 74   < > 79 78   BILITOTAL 0.6 0.6   < > 1.0 1.1   LIPASE  --   --   --  78 62*    < > = values in this interval not displayed.     Recent Labs   Lab Test 02/15/19  0756 02/14/19  0640  02/13/19  1247   WBC  --  4.3  --  5.0   HGB 9.7* 8.8*   < > 7.8*   PLT  --  154  --  195    < > = values in this interval not displayed.     Recent Labs   Lab Test 02/13/19  1423   ABO O   RH Pos     Recent Labs   Lab Test 10/19/18  2156 05/07/18  0608  01/05/18  0800   INR 1.54*  --   --  1.00   PTT 31 51*   < >  --     < > = values in this interval not displayed.      Recent Labs   Lab Test 07/21/18  0800 07/16/18  1718 05/22/18  1639   TROPI <0.015 <0.015 <0.015     Recent Labs   Lab Test 12/27/15  1015 08/08/12  1310   PH 7.41 7.35   PCO2 50* 44   PO2 79* 55*   HCO3 32* 24     No results for input(s): HCG in the last 29338 hours.  Recent Results (from the past 744 hour(s))   XR Pelvis G/E 3 Views    Narrative    PELVIS AND SACROILIAC JOINTS 3 VIEWS  2/12/2019 12:27 AM     HISTORY: Fall. Pain in both sacroiliac joints.    COMPARISON: 12/21/2018.      Impression    IMPRESSION:  1. No visualized acute fracture or  malalignment of the pelvis.  2. Mild degenerative changes in bilateral sacroiliac joints. No  evidence of sacroiliitis.  3. A left hip arthroplasty is in place. No evidence of hardware  failure.  4. Moderate degenerative changes in the right hip joint.    CHANDANA VELAZQUEZ MD       RECENT LABS:   ECG:   ECHO:   Anesthesia Pre-Procedure Evaluation    Patient: Helene Gibbs   MRN: [id number] : 1935          Preoperative Diagnosis: ANEMIA    Procedure(s):  COMBINED ESOPHAGOSCOPY, GASTROSCOPY, DUODENOSCOPY (EGD)  COLONOSCOPY    Past Medical History:   Diagnosis Date     Anemia      Ankle arthritis      Arthritis      Back pain      Bell's palsy     left     Breast CA (H) -    left lumpectomy, radiation, -recurrence     Breast cancer (H)     Left breast lumpectomy w LN disection, and radiation therapy     CKD (chronic kidney disease)     stage 3 baseline Cr 1.3     Colon polyps     colonoscopy--next due in      Congestive heart failure (H)      COPD (chronic obstructive pulmonary disease) (H)      Coronary artery disease      DM (diabetes mellitus) (H)      GERD (gastroesophageal reflux disease)      Hyperlipidemia      Hypertension      Lumbar spinal stenosis     use cane     Nicotine dependence      Obesity      Osteoporosis      Other chronic pain      Pacemaker      Permanent atrial fibrillation (H) 2008    S/P AV node ablation and pacemaker 10-25-12       Pleural effusion      Pulmonary hypertension (H)      PVD (peripheral vascular disease) (H)      Rectal stenosis      Tobacco abuse     current     Tricuspid regurgitation      Past Surgical History:   Procedure Laterality Date     ARTHROPLASTY HIP Left 10/20/2018    Procedure: LEFT HIP HERACLIO-ARTHROPLASTY ;  Surgeon: Ish Fish MD;  Location: [facility] OR     ARTHROSCOPY SHOULDER RT/LT  ,     Bilateral, right then left     BONE MARROW BIOPSY, BONE SPECIMEN, NEEDLE/TROCAR N/A 2017    Procedure: BIOPSY BONE MARROW;  BONE MARROW  BIOPSY;  Surgeon: Marino Cohen MD;  Location:  GI     C DEXA, BONE DENSITY, AXIAL SKEL       C MAMMOGRAM, SCREENING  1/2009     COLONOSCOPY N/A 10/7/2016    Procedure: COMBINED COLONOSCOPY, SINGLE OR MULTIPLE BIOPSY/POLYPECTOMY BY BIOPSY;  Surgeon: Cassandra Mccord MD;  Location:  GI     ESOPHAGOSCOPY, GASTROSCOPY, DUODENOSCOPY (EGD), COMBINED N/A 8/10/2017    Procedure: COMBINED ESOPHAGOSCOPY, GASTROSCOPY, DUODENOSCOPY (EGD), BIOPSY SINGLE OR MULTIPLE;  gastroscopy;  Surgeon: Helene Bustamante MD;  Location:  GI     H ABLATION AV NODE  10/2012     HC COLONOSCOPY THRU STOMA, DIAGNOSTIC  ? 2005     IMPLANT PACEMAKER  10/2012    [place] [id number], [id number]     IRRIGATION AND DEBRIDEMENT LOWER EXTREMITY, COMBINED Left 12/21/2018    Procedure: INCISION AND DRAINAGE OF LEFT HIP WITH EXCISION OF BONE FRAGMENT;  Surgeon: Ish Fish MD;  Location:  OR     JOINT REPLACEMTN, KNEE RT/LT      Joint Replacement knee bilateral     LAPAROSCOPIC CHOLECYSTECTOMY WITH CHOLANGIOGRAMS N/A 12/14/2015    Procedure: LAPAROSCOPIC CHOLECYSTECTOMY WITH CHOLANGIOGRAMS;  Surgeon: Ervin Amos MD;  Location:  OR     LUMPECTOMY BREAST      Left-2004     PHACOEMULSIFICATION CLEAR CORNEA WITH STANDARD INTRAOCULAR LENS IMPLANT Left 7/16/2015    Procedure: PHACOEMULSIFICATION CLEAR CORNEA WITH STANDARD INTRAOCULAR LENS IMPLANT;  Surgeon: Sai Obregon MD;  Location:  [facility]     PHACOEMULSIFICATION CLEAR CORNEA WITH TORIC INTRAOCULAR LENS IMPLANT Right 5/9/2017    Procedure: PHACOEMULSIFICATION CLEAR CORNEA WITH TORIC INTRAOCULAR LENS IMPLANT;  RIGHT EYE PHACOEMULSIFICATION CLEAR CORNEA WITH TORIC INTRAOCULAR LENS IMPLANT ;  Surgeon: Duc Richmond MD;  Location:  [facility]       Anesthesia Evaluation     . Pt has had prior anesthetic.     No history of anesthetic complications          ROS/MED HX    ENT/Pulmonary:     (+)tobacco use, Past use COPD, , . .    Neurologic:       Cardiovascular:     (+)  "hypertension--CAD, --. : . CHF . . pacemaker :. . pulmonary hypertension,       METS/Exercise Tolerance:     Hematologic:         Musculoskeletal:         GI/Hepatic:     (+) GERD       Renal/Genitourinary:     (+) chronic renal disease, type: CRI,       Endo:     (+) type II DM Obesity, .      Psychiatric:         Infectious Disease:         Malignancy:         Other:    (+) H/O Chronic Pain,                        Physical Exam  Normal systems: cardiovascular and pulmonary    Airway   Mallampati: I  Neck ROM: full    Dental   (+) upper dentures, partials and caps    Cardiovascular       Pulmonary             Lab Results   Component Value Date    WBC 4.3 02/14/2019    HGB 9.7 (L) 02/15/2019    HCT 29.5 (L) 02/14/2019     02/14/2019    CRP 30.8 (H) 12/25/2018    SED 48 (H) 12/25/2018     02/13/2019    POTASSIUM 3.8 02/14/2019    CHLORIDE 99 02/13/2019    CO2 35 (H) 02/13/2019    BUN 48 (H) 02/13/2019    CR 1.97 (H) 02/13/2019     (H) 02/13/2019    GERARD 9.3 02/13/2019    PHOS 4.0 12/25/2018    MAG 4.9 (H) 11/19/2018    ALBUMIN 2.4 (L) 12/25/2018    PROTTOTAL 6.5 (L) 12/19/2018    ALT 10 12/19/2018    AST 6 12/19/2018    ALKPHOS 82 12/19/2018    BILITOTAL 0.6 12/19/2018    LIPASE 78 07/18/2018    PTT 31 10/19/2018    INR 1.54 (H) 10/19/2018    TSH 3.62 12/19/2018    T4 1.34 10/01/2009       Preop Vitals  BP Readings from Last 3 Encounters:   02/15/19 126/72   02/12/19 127/69   01/03/19 104/64    Pulse Readings from Last 3 Encounters:   02/13/19 69   02/12/19 71   01/03/19 69      Resp Readings from Last 3 Encounters:   02/15/19 18   02/11/19 18   01/03/19 14    SpO2 Readings from Last 3 Encounters:   02/15/19 96%   02/12/19 96%   01/03/19 93%      Temp Readings from Last 1 Encounters:   02/15/19 36.3  C (97.4  F) (Oral)    Ht Readings from Last 1 Encounters:   02/13/19 1.651 m (5' 5\")      Wt Readings from Last 1 Encounters:   02/14/19 70.8 kg (156 lb)    Estimated body mass index is 25.96 kg/m  as " "calculated from the following:    Height as of this encounter: 1.651 m (5' 5\").    Weight as of this encounter: 70.8 kg (156 lb).       Anesthesia Plan      History & Physical Review  History and physical reviewed and following examination; no interval change.    ASA Status:  3 .    NPO Status:  > 8 hours    Plan for MAC with Intravenous induction. Reason for MAC:  Deep or markedly invasive procedure (G8)  PONV prophylaxis:  Ondansetron (or other 5HT-3)       Postoperative Care  Postoperative pain management:  IV analgesics.      Consents  Anesthetic plan, risks, benefits and alternatives discussed with:  Patient..                 Ranulfo Barger MD  "

## 2019-02-15 NOTE — PROGRESS NOTES
[facility]    Medicine Progress Note - Hospitalist Service       Date of Admission:  2/13/2019  Assessment & Plan      Helene Gibbs is an 83-year-old who presents with progressive anemia in the setting of stage IV chronic kidney disease who likely has worsening symptoms due to her anemia.      1.  Symptomatic anemia.    - s/p 1 unit(s) PRBC  - hgb 7.8 up to 8.8 this morning after 1 unit transfused, 9.7 on 2/15 am  - PPI IV continued  - GI following - planning prep this afternoon, EGD/Colonoscopy tomorrow  - Hold PTA anticoagulation  - The patient received 1 unit packed red blood cells.    - could be a non-GI bleed and may be a result of her worsening symptoms of her chronic kidney disease.     2.  Hypokalemia, normalized  - Certainly can make her feel weak.    - Supplemented     3.  History of paroxysmal atrial fibrillation.    - in SR, holding apixaban, continue metoprolol    4.  Diabetes.    - holding PTA glipizide and do Accu-Cheks and cover her with sliding scale insulin during her hospitalization.     5.  History of Bell palsy.  We will keep the patient on her pain medications.     6.  Hyperlipidemia.  We will continue the patient on Zocor.     7.  History of congestive heart failure.  We will continue the patient on her torsemide.       ADDENDUM 1600:  EGD unremarkable, colonoscopy showing multiple angioectasias (see report for details); Hgb stable today. Advance diet once patient wakes up from anesthesia this evening. Will need to discuss consideration of apixaban resumption tomorrow morning when she is more awake.     Diet: Consistent Carbohydrate Diet [id number] Calories: Moderate Consistent CHO (4-6 CHO units/meal)    DVT Prophylaxis: Pneumatic Compression Devices  Montano Catheter: not present  Code Status: DNR/DNI      Disposition Plan   Expected discharge: 2/16 to prior living arrangement    Entered: Savage Mandel MD 02/15/2019, 4:23 PM       The patient's care was discussed with the  Bedside Nurse, Care Coordinator/ and Patient.    Savage Mandel MD  Hospitalist Service  [facility]    ______________________________________________________________________    Interval History   No new complaints. Awaiting scopes today. hgb a little up from yesterday, no clinical bleeding reported.    Data reviewed today: I reviewed all medications, new labs and imaging results over the last 24 hours. I personally reviewed no images or EKG's today.    Physical Exam   Vital Signs: Temp: 97.5  F (36.4  C) Temp src: Axillary BP: 99/63 Pulse: 70 Heart Rate: 70 Resp: 18 SpO2: 95 % O2 Device: Nasal cannula Oxygen Delivery: 2 LPM  Weight: 156 lbs 0 oz  General Appearance: Nad, pleasant  Respiratory: ctabl  Cardiovascular: s1s2 heard, reg rate, no edema  GI: s, nt, nd, +bs  Other: Aaox3, no focal deficits, moving all 4 appropriately, EOMI    Data   Recent Labs   Lab 02/15/19  0756 02/14/19  0640 02/13/19  1423 02/13/19  1247 02/11/19  2350   WBC  --  4.3  --  5.0 5.0   HGB 9.7* 8.8* 8.1* 7.8* 8.4*   MCV  --  84  --  87 85   PLT  --  154  --  195 177   NA  --   --  140 138 138   POTASSIUM  --  3.8 3.3* 4.0 3.7   CHLORIDE  --   --  99 99 97   CO2  --   --  35* 34* 35*   BUN  --   --  48* 49* 49*   CR  --   --  1.97* 1.98* 2.12*   ANIONGAP  --   --  6 5 6   GERARD  --   --  9.3 9.5 9.0   GLC  --   --  105* 153* 166*     No results found for this or any previous visit (from the past 24 hour(s)).

## 2019-02-16 NOTE — PLAN OF CARE
Pt A&Ox4: forgetful. VSS on RA. S/p Colonoscopy and EGD 2/15. No s/sx bleeding. Current Hgb 9.7 Blood sugar 150, 157. IVF at 75ml/hr. Voiding adequately. Up with SBA, walker and gait belt. Discharge pending

## 2019-02-16 NOTE — PLAN OF CARE
A&Ox4, calm and coop. Up with SBA and walker to BR, chair for meals. Abrhaan diet, good appetite, , 137, 149. Hgb 9.2. Bruise on coccyx from previous fall at long term. VSS x BP soft after AM metoprolol given. LS dim, O2 at 2L, RA sats low 80's, CXR performed, IV bumex given. SL.

## 2019-02-16 NOTE — PROGRESS NOTES
Care Transition Initial Assessment - RN        Met with: Patient.  DATA   Active Problems:    Anemia       Cognitive Status: awake, alert and oriented.        Contact information and PCP information verified: Yes                        Insurance concerns: No Insurance issues identified  ASSESSMENT  Patient currently receives the following services:  Meals at [facility],  Home PT and Home Health Aide through [facility].  Call out to [facility], message left. Phone number [phone].    Patient states they have a  that can pick her up when ready to discharge.        Identified issues/concerns regarding health management: does not use oxygen at home, continue to wean.     PLAN  Financial costs for the patient include per insurance .  Patient given options and choices for discharge yes .  Patient/family is agreeable to the plan?  Yes:   Patient anticipates discharging to home with continued services arranged .        Patient anticipates needs for home equipment: No, anticipate oxygen will be off  Plan/Disposition: Home   Appointments: see S      Care  (CTS) will continue to follow as needed.

## 2019-02-16 NOTE — PROGRESS NOTES
[facility]    Hospitalist Progress Note    Date of Service (when I saw the patient): 02/16/2019    Assessment & Plan   Helene Gibbs is a 83 year old female who was admitted on 2/13/2019 with anemia.    1.  Iron deficiency anemia.  Patient was transfused 1 unit packed red blood cells yesterday.  Hemoglobin remained stable overnight.  EGD was unremarkable but colonoscopy demonstrated numerous angiectasias, likely the source of the patient's anemia.    2.  Hypokalemia.  Replaced as per protocol.    3.  Paroxysmal atrial fibrillation.  Patient is currently in sinus rhythm.  Apixaban is currently on hold.  Continue metoprolol for rate control.    4.  Diabetes mellitus type 2.  Hold glipizide.  Continue insulin sliding scale.    5.  Bell's palsy.  Continue Lyrica and as needed tramadol.    6.  Hyperlipidemia.  Continue simvastatin.    7.  Acute on chronic diastolic heart failure with acute hypoxic respiratory failure.  Patient developed hypoxia this morning, likely due to IV fluids overnight.  She does not require chronic oxygen.  Patient is saline locked.  Chest x-ray demonstrates bibasilar pulmonary edema.  Will order IV Bumex 1 mg 3 times daily.  Continue metoprolol.    8.  COPD.  No evidence for acute exacerbation.  Continue Incruse Ellipta and as needed albuterol.    DVT Prophylaxis: Pneumatic Compression Devices  Code Status: DNR/DNI    Disposition: Expected discharge in 1-2 days, pending resolution of hypoxia.    Caden Pickett  Text Page (7 am to 6 pm)    Interval History   Patient is currently sitting comfortably in a chair.  She is hungry.  She wants to go home.  Nurses note that she was hypoxic this morning is currently on 2 L nasal cannula.    -Data reviewed today: I reviewed all new labs and imaging results over the last 24 hours. I personally reviewed no images or EKG's today.    Physical Exam   Temp: 97.6  F (36.4  C) Temp src: Oral BP: 94/50   Heart Rate: 71 Resp: 18 SpO2: 93 % O2  Device: None (Room air) Oxygen Delivery: 2 LPM  Vitals:    02/13/19 1352 02/14/19 0548   Weight: 70.3 kg (155 lb) 70.8 kg (156 lb)     Vital Signs with Ranges  Temp:  [97.5  F (36.4  C)-98.8  F (37.1  C)] 97.6  F (36.4  C)  Heart Rate:  [70-71] 71  Resp:  [16-18] 18  BP: ()/(50-68) 94/50  SpO2:  [90 %-96 %] 93 %  I/O last 3 completed shifts:  In: 1778 [P.O.:730; I.V.:1048]  Out: -     Gen: Well nourished, well developed, alert and oriented x 3, no acute distressed  HEENT: Atraumatic, normocephalic  Lungs:  Bibasilar rales without wheezes, rhonchi, or rales  Heart: Regular rate and rhythm, no gallops or rubs, no murmurs  GI: Bowel sound normal, no hepatosplenomegaly or masses  Lymph: No lymphadenopathy 3+ BLE edema  Skin: No rashes     Medications       bumetanide  1 mg Intravenous Q8H     insulin aspart  1-7 Units Subcutaneous TID AC     insulin aspart  1-5 Units Subcutaneous At Bedtime     metoprolol tartrate  25 mg Oral BID     pregabalin  75 mg Oral BID     ranitidine  75 mg Oral BID     simvastatin  10 mg Oral At Bedtime     sodium chloride (PF)  3 mL Intracatheter Q8H     umeclidinium  1 puff Inhalation Daily       Data   Recent Labs   Lab 02/16/19  1057 02/16/19  1042 02/15/19  0756 02/14/19  0640 02/13/19  1423 02/13/19  1247 02/11/19  2350   WBC  --   --   --  4.3  --  5.0 5.0   HGB 9.2*  --  9.7* 8.8* 8.1* 7.8* 8.4*   MCV  --   --   --  84  --  87 85   PLT  --   --   --  154  --  195 177   NA  --   --   --   --  140 138 138   POTASSIUM  --  4.6  --  3.8 3.3* 4.0 3.7   CHLORIDE  --   --   --   --  99 99 97   CO2  --   --   --   --  35* 34* 35*   BUN  --   --   --   --  48* 49* 49*   CR  --   --   --   --  1.97* 1.98* 2.12*   ANIONGAP  --   --   --   --  6 5 6   GERARD  --   --   --   --  9.3 9.5 9.0   GLC  --   --   --   --  105* 153* 166*       Recent Results (from the past 24 hour(s))   XR Chest 2 Views    Narrative    XR CHEST 2 VW 2/16/2019 11:45 AM    HISTORY: Hypoxia.     COMPARISON: December 24,  2018.       Impression    IMPRESSION: Left-sided cardiac device in place with leads unchanged in  position. There is diffuse bilateral interstitial prominence which  could reflect edema or chronic interstitial lung changes. There are  small bilateral effusions. Stable cardiomegaly. No pneumothorax.    JEFFERSON BERMUDEZ MD

## 2019-02-17 NOTE — PROGRESS NOTES
[facility]    Hospitalist Progress Note    Date of Service (when I saw the patient): 02/17/2019    Assessment & Plan   Helene Gibbs is a 83 year old female who was admitted on 2/13/2019 with anemia.    1.  Iron deficiency anemia.  Patient was transfused 1 unit packed red blood cells on 2/13/19.  Hemoglobin remained stable overnight.  EGD was unremarkable but colonoscopy demonstrated numerous angiectasias, likely the source of the patient's anemia.  Will give Venofer x 2 doses today.    2.  Hypokalemia.  Replaced as per protocol.    3.  Paroxysmal atrial fibrillation.  Patient is currently in sinus rhythm.  Apixaban is currently on hold.  Continue metoprolol for rate control.    4.  Diabetes mellitus type 2.  Hold glipizide.  Continue insulin sliding scale.    5.  Bell's palsy.  Continue Lyrica and as needed tramadol.    6.  Hyperlipidemia.  Continue simvastatin.    7.  Acute on chronic diastolic heart failure with acute hypoxic respiratory failure.  Patient developed hypoxia on morning on 2/16/19, likely due to IV fluids overnight.  She does not require chronic oxygen.  Patient is saline locked.  Chest x-ray 2/16/19 demonstrated bibasilar pulmonary edema.  Continue IV Bumex 1 mg 3 times daily.  Continue metoprolol.  Will check Echo, last on 3/1/18 demonstrated EF 50-55%, sev NYDIA, mod TR, mild pulm htn.    8.  COPD.  No evidence for acute exacerbation.  Encouraged patient to take Incruse Ellipta and continue as needed albuterol.    DVT Prophylaxis: Pneumatic Compression Devices  Code Status: DNR/DNI    Disposition: Expected discharge in 1-2 days, pending resolution of hypoxia.    Caden Pickett  Text Page (7 am to 6 pm)    Interval History   Patient is currently sitting comfortably in a chair.  She wants to go home.  She denies dyspnea, but nursing notes that patient continues to require supplemental oxygen.    -Data reviewed today: I reviewed all new labs and imaging results over the last 24  hours. I personally reviewed no images or EKG's today.    Physical Exam   Temp: 97.3  F (36.3  C) Temp src: Oral BP: 119/68 Pulse: 70 Heart Rate: 70 Resp: 18 SpO2: 95 % O2 Device: Nasal cannula Oxygen Delivery: 2 LPM  Vitals:    02/13/19 1352 02/14/19 0548   Weight: 70.3 kg (155 lb) 70.8 kg (156 lb)     Vital Signs with Ranges  Temp:  [97.2  F (36.2  C)-98.8  F (37.1  C)] 97.3  F (36.3  C)  Pulse:  [70] 70  Heart Rate:  [64-70] 70  Resp:  [16-18] 18  BP: (105-119)/(59-68) 119/68  SpO2:  [88 %-95 %] 95 %  I/O last 3 completed shifts:  In: 1841 [P.O.:900; I.V.:941]  Out: -     Gen: Well nourished, well developed, alert and oriented x 3, no acute distressed  HEENT: Atraumatic, normocephalic  Lungs:  Bibasilar rales without wheezes, rhonchi, or rales  Heart: Regular rate and rhythm, no gallops or rubs, no murmurs  GI: Bowel sound normal, no hepatosplenomegaly or masses  Lymph: No lymphadenopathy 3+ BLE edema  Skin: No rashes     Medications       insulin aspart  1-7 Units Subcutaneous TID AC     insulin aspart  1-5 Units Subcutaneous At Bedtime     iron sucrose (VENOFER) intermittent infusion  300 mg Intravenous Daily     metoprolol tartrate  25 mg Oral BID     polyethylene glycol  17 g Oral Daily     pregabalin  75 mg Oral BID     ranitidine  75 mg Oral BID     senna-docusate  2 tablet Oral BID     simvastatin  10 mg Oral At Bedtime     sodium chloride (PF)  3 mL Intracatheter Q8H     umeclidinium  1 puff Inhalation Daily       Data   Recent Labs   Lab 02/17/19  0752 02/16/19  1057 02/16/19  1042 02/15/19  0756 02/14/19  0640 02/13/19  1423 02/13/19  1247 02/11/19  2350   WBC  --   --   --   --  4.3  --  5.0 5.0   HGB 9.4* 9.2*  --  9.7* 8.8* 8.1* 7.8* 8.4*   MCV  --   --   --   --  84  --  87 85   PLT  --   --   --   --  154  --  195 177     --   --   --   --  140 138 138   POTASSIUM 4.1  --  4.6  --  3.8 3.3* 4.0 3.7   CHLORIDE 100  --   --   --   --  99 99 97   CO2 33*  --   --   --   --  35* 34* 35*   BUN 34*   --   --   --   --  48* 49* 49*   CR 1.84*  --   --   --   --  1.97* 1.98* 2.12*   ANIONGAP 8  --   --   --   --  6 5 6   GERARD 9.6  --   --   --   --  9.3 9.5 9.0   *  --   --   --   --  105* 153* 166*       Recent Results (from the past 24 hour(s))   XR Chest 2 Views    Narrative    XR CHEST 2 VW 2/16/2019 11:45 AM    HISTORY: Hypoxia.     COMPARISON: December 24, 2018.       Impression    IMPRESSION: Left-sided cardiac device in place with leads unchanged in  position. There is diffuse bilateral interstitial prominence which  could reflect edema or chronic interstitial lung changes. There are  small bilateral effusions. Stable cardiomegaly. No pneumothorax.    JEFFERSON BERMUDEZ MD

## 2019-02-17 NOTE — PLAN OF CARE
Pt A&Ox4. VSS on RA. , 163. Edema remains to BLE, on IV Bumex. Denies any SOB. Denies any pain/discomfort. Up with stand by assist. Discharge pending

## 2019-02-17 NOTE — PLAN OF CARE
A&Ox4, calm and coop. Up with SBA to BR and chair yusef tijerina in lane. VSS. Denies pain. Abrahan diet, good appetite, , 171. C/O constipation, senna and miralax started. Hgb 9.4, IV iron infusion given. Echo performed. Cont on 2L O2 with crackles in franky bases, IV bumex given. Pt's own franky compression stockings donned. Rash present beneath left breast in fold, had previous to admission, applies nystatin cream PRN at home, ordered today.

## 2019-02-18 NOTE — PLAN OF CARE
Pt is A&Ox4, cooperative and anxious at times, pt really wants to be discharged. Up with SBA, walker and GB. VSS on 1L of oxygen, sating 92%-94%. Reports back pain, but declines intervention. LS coarse and diminished, continues on IV Bumex. Pt is st I/O and needs weight every morning.  Chest xray today, see results. /170. Tolerating mod carb diet, good appetite. +BS, one large BM today after senna and miralax.  Hgb 8.6, IV iron infusion given.  Pt's own franky compression stockings donned. Rash present beneath left breast in fold, washed with soap and water and nystatin cream applied. Restarted on Eliquis today.

## 2019-02-18 NOTE — PLAN OF CARE
Pt A&Ox4, forgetful at times. VSS on 2L: O2 saturation drops to 87-88 % on room air. Lung bases with fine crackles. Blood sugar 154. She c/o back pain, PRN Tramadol effective. Up with standby assist with gait belt and walker.Discharge pending 1-2 days

## 2019-02-18 NOTE — PLAN OF CARE
Pt a/ox4, forgetful at times. VS stable on 2L oxygen. LS fine crackles. Up with standby assist using walker.

## 2019-02-18 NOTE — PROGRESS NOTES
[facility]    Hospitalist Progress Note      Assessment & Plan   Helene Gibbs is a 83 year old female who was admitted on 2/13/2019 with anemia.     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 She is s/p 1 unit packed red blood cells on 2/13/19.    - received Venofer x 2 doses in hospital  - hemoglobin currently stable ~8.6  - no reported melena or BRBPR currently  - will monitor    Acute on chronic diastolic heart failure with acute hypoxic respiratory failure.   Patient developed hypoxia on morning on 2/16/19, likely due to IV fluids overnight.  She does not require chronic oxygen.  Chest x-ray 2/16/19 demonstrated bibasilar pulmonary edema.    - repeat CXR 2/18 shows improved bilateral effusions and pulm infiltrates  - TTE (2/17)shows LVEF of 50-55%, moderately decreased RV systolic function, moderately severe TR, severe pulm hypertension  - continue with IV Bumex 1mg x 2 more doses  - check daily weight and strict I/Os  - wean oxygen as able    Acute on chronic renal insufficiency  Baseline cr 1.5-1.8  - cr 1.98 on admission. Currently improving with diuresis, cr 1.5 today  - continue to monitor, avoid nephrotoxin agents  - check BMP in AM    Hypokalemia.  Replaced as per protocol.     Chronic atrial fibrillation.    S/p prior ablations and pacemaker placement.   - discussed with Dr. Juarez regarding use of eliquis. Given her high [id number] score of 5, she is high risk for thromboembolism. Will continue low dose apixaban 2.5mg BID and will have EP follow up appointment made for her ( office will arrange) to discuss watchman procedure.   - continue with metoprolol.      Diabetes mellitus type 2.    - Hold glipizide.  Continue insulin sliding scale.     Bell's palsy.  Continue Lyrica and as needed tramadol.     Hyperlipidemia.  Continue  simvastatin.     COPD.  No evidence for acute exacerbation.  Encouraged patient to take Incruse Ellipta and continue as needed albuterol.    Communications: discussed with patient's RN     DVT Prophylaxis: Pneumatic Compression Devices  Code Status: DNR/DNI     Disposition: Expected discharge in 1-2 days, pending resolution of hypoxia.      Wale Jeong MD  Text Page  (7am to 6pm)    Interval History   Still needing oxygen. Denies chest pain or shortness of breath. She is afebrile.   No BM in few days.     -Data reviewed today: I reviewed all new labs and imaging results over the last 24 hours. I personally reviewed the chest x-ray image(s) showing improving edema.    Physical Exam   Temp: 97.5  F (36.4  C) Temp src: Oral BP: 134/69 Pulse: 69 Heart Rate: 68 Resp: 16 SpO2: 95 % O2 Device: Nasal cannula Oxygen Delivery: 2 LPM  Vitals:    02/13/19 1352 02/14/19 0548   Weight: 70.3 kg (155 lb) 70.8 kg (156 lb)     Vital Signs with Ranges  Temp:  [97.3  F (36.3  C)-97.6  F (36.4  C)] 97.5  F (36.4  C)  Pulse:  [69] 69  Heart Rate:  [68-81] 68  Resp:  [16] 16  BP: (105-134)/(58-69) 134/69  SpO2:  [92 %-96 %] 95 %  I/O last 3 completed shifts:  In: 480 [P.O.:480]  Out: -     Constitutional: Alert, awake and no  Apparent distress  Respiratory: Clear to ausculation bialterally  Cardiovascular: regular, +edema 1+ in LE  GI: soft and non-tender  Skin/Integumen: warm and dry       Medications       bumetanide  1 mg Intravenous BID     insulin aspart  1-7 Units Subcutaneous TID AC     insulin aspart  1-5 Units Subcutaneous At Bedtime     iron sucrose (VENOFER) intermittent infusion  300 mg Intravenous Daily     metoprolol tartrate  25 mg Oral BID     polyethylene glycol  17 g Oral Daily     pregabalin  75 mg Oral BID     ranitidine  75 mg Oral BID     senna-docusate  2 tablet Oral BID     simvastatin  10 mg Oral At Bedtime     sodium chloride (PF)  3 mL Intracatheter Q8H     umeclidinium  1 puff Inhalation Daily        Data   Recent Labs   Lab 02/18/19  0745 02/17/19  0752 02/16/19  1057 02/16/19  1042  02/14/19  0640 02/13/19  1423 02/13/19  1247 02/11/19  2350   WBC  --   --   --   --   --  4.3  --  5.0 5.0   HGB 8.6* 9.4* 9.2*  --    < > 8.8* 8.1* 7.8* 8.4*   MCV  --   --   --   --   --  84  --  87 85   PLT  --   --   --   --   --  154  --  195 177    141  --   --   --   --  140 138 138   POTASSIUM 3.5 4.1  --  4.6  --  3.8 3.3* 4.0 3.7   CHLORIDE 101 100  --   --   --   --  99 99 97   CO2 37* 33*  --   --   --   --  35* 34* 35*   BUN 30 34*  --   --   --   --  48* 49* 49*   CR 1.51* 1.84*  --   --   --   --  1.97* 1.98* 2.12*   ANIONGAP 5 8  --   --   --   --  6 5 6   GERARD 9.4 9.6  --   --   --   --  9.3 9.5 9.0   * 142*  --   --   --   --  105* 153* 166*    < > = values in this interval not displayed.       Recent Results (from the past 24 hour(s))   XR Chest 2 Views    Narrative    CHEST TWO VIEWS  2/18/2019 8:44 AM     HISTORY:  Hypoxia.    COMPARISON: February 16, 2019       Impression    IMPRESSION: Minimal bilateral effusions and associated atelectasis  and/or infiltrate are improved since the comparison study. Cardiac  silhouette stable.

## 2019-02-19 NOTE — PLAN OF CARE
Pt is A&Ox4, cooperative and but really wants to be discharged. Up with SBA, walker and GB. VSS on 1/2L of oxygen, sating 92%-94%. Tried to wean pt off the oxygen but pt does desats to low 80% with activity. Reports back pain, Tramadol x 1 given. LS coarse and diminished, continues on IV Bumex. Pt is st I/O and needs standing weight every morning. /164. Tolerating mod carb diet, good appetite. +BS, one large BM today. Pt's own franky compression stockings donned. Rash present beneath left breast in fold, washed with soap and water and Miconazole cream applied.

## 2019-02-19 NOTE — PLAN OF CARE
Pt orientedx4. Up A-1 walker and GB. Excited to be discharging soon (hoping tomorrow or the day after). VSS on 1/2L 02, sating mid 90's, desats with movement. Back pain managed with tramadol. LS coarse/diminished. IV bumex. I/O's. Need weight in AM. Chest x-ray showing improvement in lungs. , 177. Mod carb diet, tolerating. hgb 8.7. Compression stockings off for the night. eliquis restarted. Monitor.

## 2019-02-19 NOTE — PLAN OF CARE
A&O x 4. VSS 1/2L O2 via nasal canula. Lung sounds coarse. . Prn Tramadol was given for c/o back pain. Same was helpful. IV SL. Up with stand by assist to the bathroom. Slept well. Discharge in 1-2 days pending resolution of hypoxia. Will continue to monitor

## 2019-02-19 NOTE — PROGRESS NOTES
[facility]    Hospitalist Progress Note      Assessment & Plan   Helene Gibbs is a 83 year old female who was admitted on 2/13/2019 with anemia.     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 She is s/p 1 unit packed red blood cells on 2/13/19.    - received Venofer x 2 doses in hospital  - hemoglobin currently stable ~8.6  - no reported melena or BRBPR currently  - will monitor    Acute on chronic diastolic heart failure with acute hypoxic respiratory failure.   Patient developed hypoxia on morning on 2/16/19, likely due to IV fluids overnight.  She does not require chronic oxygen.  Chest x-ray 2/16/19 demonstrated bibasilar pulmonary edema.    - repeat CXR 2/18 shows improved bilateral effusions and pulm infiltrates  - TTE (2/17)shows LVEF of 50-55%, moderately decreased RV systolic function, moderately severe TR, severe pulm hypertension  - still hypoxic and wt up, will continue with IV Bumex 1mg x 3more doses  - daily standing weight and strict I/Os  - wean oxygen as able, may need to discharge home on oxygen if unable to wean off and she is in agreement with this    Acute on chronic renal insufficiency  Baseline cr 1.5-1.8  - cr 1.98 on admission. Currently improving with diuresis, stable around 1.5  - continue to monitor, avoid nephrotoxin agents  - check BMP in AM while diuresing     Hypokalemia.  Replaced as per protocol.     Chronic atrial fibrillation.    S/p prior ablations and pacemaker placement.   - discussed with Dr. Juarez on 2/18regarding use of eliquis. Given her high [id number] score of 5, she is high risk for thromboembolism. Will continue low dose apixaban 2.5mg BID and will have EP follow up appointment made for her ( office will arrange) to discuss watchman procedure.   - continue with metoprolol.      Diabetes mellitus type 2.     - Hold glipizide.  Continue insulin sliding scale.     Bell's palsy.  Continue Lyrica and as needed tramadol.     Hyperlipidemia.  Continue simvastatin.     COPD.  No evidence for acute exacerbation.  Encouraged patient to take Incruse Ellipta and continue as needed albuterol.    Communications: discussed with patient's RN     DVT Prophylaxis: Pneumatic Compression Devices  Code Status: DNR/DNI     Disposition: Expected discharge in 1-2 days, pending improvement in hypoxia and off IV diuresis      Wale Jeong MD  Text Page  (7am to 6pm)    Interval History   She is still requiring oxygen. Per nursing staff, o2 drops when she is off oxygen. Wt is also up since admission, but down from yesterday. Good diuresis with bumex yesterday. Denies bloody BM or melena.     -Data reviewed today: I reviewed all new labs and imaging results over the last 24 hours. I personally reviewed no images or EKG's today.    Physical Exam   Temp: 97.5  F (36.4  C) Temp src: Oral BP: 117/70 Pulse: 70 Heart Rate: 71 Resp: 16 SpO2: 95 % O2 Device: Nasal cannula Oxygen Delivery: 1/2 LPM  Vitals:    02/18/19 1032 02/19/19 0620 02/19/19 0843   Weight: 76 kg (167 lb 8.8 oz) 76.1 kg (167 lb 12.3 oz) 74.8 kg (164 lb 14.5 oz)     Vital Signs with Ranges  Temp:  [97.3  F (36.3  C)-97.7  F (36.5  C)] 97.5  F (36.4  C)  Pulse:  [66-70] 70  Heart Rate:  [71] 71  Resp:  [16-18] 16  BP: (106-117)/(63-70) 117/70  SpO2:  [94 %-95 %] 95 %  I/O last 3 completed shifts:  In: 640 [P.O.:640]  Out: 2250 [Urine:2250]    Constitutional: Alert, awake and no  Apparent distress  Respiratory: Crackles at the bases bilaterally, no wheezing  Cardiovascular: regular, +edema 1+ in LE  GI: soft and non-tender  Skin/Integumen: warm and dry       Medications       apixaban ANTICOAGULANT  2.5 mg Oral BID     bumetanide  1 mg Intravenous Q8H     insulin aspart  1-7 Units Subcutaneous TID AC     insulin aspart  1-5 Units Subcutaneous At Bedtime     metoprolol tartrate   25 mg Oral BID     polyethylene glycol  17 g Oral Daily     pregabalin  75 mg Oral BID     ranitidine  75 mg Oral BID     senna-docusate  2 tablet Oral BID     simvastatin  10 mg Oral At Bedtime     sodium chloride (PF)  3 mL Intracatheter Q8H     umeclidinium  1 puff Inhalation Daily       Data   Recent Labs   Lab 02/19/19  0809 02/18/19  0745 02/17/19  0752 02/16/19  1057  02/14/19  0640  02/13/19  1247   WBC  --   --   --   --   --  4.3  --  5.0   HGB  --  8.6* 9.4* 9.2*   < > 8.8*   < > 7.8*   MCV  --   --   --   --   --  84  --  87   PLT  --   --   --   --   --  154  --  195    143 141  --   --   --    < > 138   POTASSIUM 3.7 3.5 4.1  --    < > 3.8   < > 4.0   CHLORIDE 97 101 100  --   --   --    < > 99   CO2 36* 37* 33*  --   --   --    < > 34*   BUN 27 30 34*  --   --   --    < > 49*   CR 1.51* 1.51* 1.84*  --   --   --    < > 1.98*   ANIONGAP 7 5 8  --   --   --    < > 5   GERARD 9.8 9.4 9.6  --   --   --    < > 9.5   * 147* 142*  --   --   --    < > 153*    < > = values in this interval not displayed.       No results found for this or any previous visit (from the past 24 hour(s)).

## 2019-02-20 NOTE — PLAN OF CARE
A&O x 4. Forgetful and impulsive. VSS on room air. Denied pain. Tele SR with BBB. Infusion in progress at 75 ml/hr. Incontinent of urine. Having frequency and urgency. Colostomy bag in place over stoma. Stoma active with soft brown stool. Up with assist of 1. Slept intermittently. Expected discharge pending progress. Contact precautions maintained. Will continue to monitor.

## 2019-02-20 NOTE — PLAN OF CARE
Discharge Planner PT     PT: Orders received, eval completed.  Pt lives in an assisted living facility. At baseline ambulates mod-I with 4WW    Patient plan for discharge: Return to [facility]  Current status: IND with transfers. O2 sat 97% on 2.5L at rest, 100% on 3L post activity. Ambulates 200' SBA with FWW, pauses for conversation and endorses generalized weakness/fatigue. Will be discharging today on home oxygen. Will complete order.  Barriers to return to prior living situation: None  Recommendations for discharge: Home with resumption of HH PT  Rationale for recommendations: Pt mobilizing safely on supplemental O2 to return home and ambulation household distances. Presents with decreased activity tolerance, will defer to HH PT as pt is discharging today.       Entered by: Cong Mendoza 02/20/2019 2:08 PM

## 2019-02-20 NOTE — PROGRESS NOTES
02/20/19 1400   Quick Adds   Type of Visit Initial PT Evaluation   Living Environment   Lives With facility resident   Living Arrangements assisted living   Home Accessibility no concerns   Living Environment Comment Assistance for cleaning and bathing, meals provided   Self-Care   Usual Activity Tolerance fair   Current Activity Tolerance poor   Equipment Currently Used at Home walker, rolling   Activity/Exercise/Self-Care Comment  PT, walks the halls to meals   Functional Level Prior   Ambulation 1-->assistive equipment   Transferring 1-->assistive equipment   Toileting 1-->assistive equipment   Bathing 3-->assistive equipment and person   Fall history within last six months yes   Number of times patient has fallen within last six months 2  (led to previous admission)   Which of the above functional risks had a recent onset or change? ambulation   Prior Functional Level Comment Apartment distances with 4WW at baseline IND   General Information   Onset of Illness/Injury or Date of Surgery - Date 02/13/19   Referring Physician Wale Jeong MD   Patient/Family Goals Statement Return to residential   Pertinent History of Current Problem (include personal factors and/or comorbidities that impact the POC) 83 year old female who was admitted on 2/13/2019 with anemia, ARF. Orders for discharge dispo today.   Precautions/Limitations fall precautions   General Observations Pt on 2.5L O2 at rest, appears lethargic   Cognitive Status Examination   Orientation orientation to person, place and time   Level of Consciousness alert;lethargic/somnolent   Follows Commands and Answers Questions 100% of the time   Posture    Posture Forward head position;Protracted shoulders   Range of Motion (ROM)   ROM Comment WFL   Strength   Strength Comments NT, B LE > 3/5 functionally   Bed Mobility   Bed Mobility Comments Not assessed, up in chair   Transfer Skills   Transfer Comments mod-I sit<>stand to FWW   Gait   Gait Comments SBA on  "3L O2 with FWW, O2 sat 100% post   Balance   Balance Comments Requires UE support for dynamic balance   Sensory Examination   Sensory Perception Comments Baseline neuropathy   Clinical Impression   Criteria for Skilled Therapeutic Intervention evaluation only   PT Diagnosis Decreased functional endurance   Influenced by the following impairments Respiratory status, generalized weakness   Functional limitations due to impairments Decreased endurance for functional activity   Clinical Presentation Stable/Uncomplicated   Clinical Presentation Rationale Medically stable   Clinical Decision Making (Complexity) Low complexity   Therapy Frequency` (Defer to  PT)   Anticipated Discharge Disposition Home with Home Therapy   Risk & Benefits of therapy have been explained Yes   Patient, Family & other staff in agreement with plan of care Yes   [facility] TM \"6 Clicks\"   2016, Trustees of [facility], under license to [employer].  All rights reserved.   6 Clicks Short Forms Basic Mobility Inpatient Short Form   [facility]  \"6 Clicks\" V.2 Basic Mobility Inpatient Short Form   1. Turning from your back to your side while in a flat bed without using bedrails? 4 - None   2. Moving from lying on your back to sitting on the side of a flat bed without using bedrails? 4 - None   3. Moving to and from a bed to a chair (including a wheelchair)? 4 - None   4. Standing up from a chair using your arms (e.g., wheelchair, or bedside chair)? 4 - None   5. To walk in hospital room? 4 - None   6. Climbing 3-5 steps with a railing? 3 - A Little   Basic Mobility Raw Score (Score out of 24.Lower scores equate to lower levels of function) 23   Total Evaluation Time   Total Evaluation Time (Minutes) 18     "

## 2019-02-20 NOTE — PROGRESS NOTES
[facility]    Hospitalist Progress Note      Assessment & Plan   Helene Gibbs is a 83 year old female who was admitted on 2/13/2019 with anemia.     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 She is s/p 1 unit packed red blood cells on 2/13/19.    - received Venofer x 2 doses in hospital  - hemoglobin currently stable 10  - no reported melena or BRBPR currently  - will monitor    Acute on chronic diastolic heart failure with acute hypoxic respiratory failure.   Patient developed hypoxia on morning on 2/16/19, likely due to IV fluids overnight.  She does not require chronic oxygen.  Chest x-ray 2/16/19 demonstrated bibasilar pulmonary edema.    - repeat CXR 2/18 shows improved bilateral effusions and pulm infiltrates  - TTE (2/17)shows LVEF of 50-55%, moderately decreased RV systolic function, moderately severe TR, severe pulm hypertension  - last dose of IV Bumex this am, states 164 Ibs is her dry weight  - restart PTA torsemide tomorrow  - daily standing weight and strict I/Os  -unable to wean off O2 and will need O2 at discharge, RN will check O2 at rest and with activity    Acute on chronic renal insufficiency  Baseline cr 1.5-1.8  - cr 1.98 on admission. Currently improving with diuresis, improved to 1.4  - continue to monitor, avoid nephrotoxin agents    Hypokalemia.  Replaced as per protocol.     Chronic atrial fibrillation.    S/p prior ablations and pacemaker placement.   - discussed with Dr. Juarez on 2/18regarding use of eliquis. Given her high [id number] score of 5, she is high risk for thromboembolism. Will continue low dose apixaban 2.5mg BID and will have EP follow up appointment made for her ( office will arrange) to discuss watchman procedure.   - continue with metoprolol.      Diabetes mellitus type 2.    - Hold glipizide.  Continue  insulin sliding scale.     Bell's palsy.  Continue Lyrica and as needed tramadol.     Hyperlipidemia.  Continue simvastatin.     COPD.  No evidence for acute exacerbation.  Encouraged patient to take Incruse Ellipta and continue as needed albuterol.    Communications: discussed with patient's RN and care coordinator     DVT Prophylaxis: Pneumatic Compression Devices  Code Status: DNR/DNI     Disposition: Expected discharge today once PT eval is completed.       Wale Jeong MD  Text Page  (7am to 6pm)    Interval History   She is still requiring oxygen. Denies shortness of breath. States her dry wt is about 164 Ibs. Denies n/v or abdominal pain    -Data reviewed today: I reviewed all new labs and imaging results over the last 24 hours. I personally reviewed no images or EKG's today.    Physical Exam   Temp: 98.2  F (36.8  C) Temp src: Oral BP: 106/57   Heart Rate: 69 Resp: 16 SpO2: 92 % O2 Device: Nasal cannula Oxygen Delivery: 1 LPM  Vitals:    02/19/19 0843 02/20/19 0559 02/20/19 1033   Weight: 74.8 kg (164 lb 14.5 oz) 75.2 kg (165 lb 12.6 oz) 74.1 kg (163 lb 6.4 oz)     Vital Signs with Ranges  Temp:  [97.4  F (36.3  C)-98.2  F (36.8  C)] 98.2  F (36.8  C)  Heart Rate:  [69-74] 69  Resp:  [16-18] 16  BP: (106-134)/(57-75) 106/57  SpO2:  [92 %-97 %] 92 %  I/O last 3 completed shifts:  In: 630 [P.O.:630]  Out: 2175 [Urine:2175]    Constitutional: Alert, awake and no  Apparent distress  Respiratory: CTA bilaterally, no wheezing  Cardiovascular: regular, +edema 1+ in LE  GI: soft and non-tender  Skin/Integumen: warm and dry       Medications       apixaban ANTICOAGULANT  2.5 mg Oral BID     insulin aspart  1-7 Units Subcutaneous TID AC     insulin aspart  1-5 Units Subcutaneous At Bedtime     metoprolol tartrate  25 mg Oral BID     polyethylene glycol  17 g Oral Daily     pregabalin  75 mg Oral BID     ranitidine  75 mg Oral BID     senna-docusate  2 tablet Oral BID     simvastatin  10 mg Oral At Bedtime      sodium chloride (PF)  3 mL Intracatheter Q8H     torsemide  20 mg Oral Daily     umeclidinium  1 puff Inhalation Daily       Data   Recent Labs   Lab 02/20/19  0820 02/19/19  0809 02/18/19  0745 02/17/19  0752  02/14/19  0640  02/13/19  1247   WBC  --   --   --   --   --  4.3  --  5.0   HGB 10.0*  --  8.6* 9.4*   < > 8.8*   < > 7.8*   MCV  --   --   --   --   --  84  --  87   PLT  --   --   --   --   --  154  --  195    140 143 141  --   --    < > 138   POTASSIUM 3.8 3.7 3.5 4.1   < > 3.8   < > 4.0   CHLORIDE 94 97 101 100  --   --    < > 99   CO2 38* 36* 37* 33*  --   --    < > 34*   BUN 26 27 30 34*  --   --    < > 49*   CR 1.41* 1.51* 1.51* 1.84*  --   --    < > 1.98*   ANIONGAP 6 7 5 8  --   --    < > 5   GERARD 10.3* 9.8 9.4 9.6  --   --    < > 9.5   * 134* 147* 142*  --   --    < > 153*    < > = values in this interval not displayed.       No results found for this or any previous visit (from the past 24 hour(s)).

## 2019-02-20 NOTE — PLAN OF CARE
Note from [id number]:  A&Ox4. Pt. satting 92-94% on 1/2 L NC. Pt. desatting to 80's on room air. OVSS. Pt. denies SOB. Pt. denies pain and nausea. Course lung sounds throughout, crackles in bilateral lower lung bases. Baseline neuropathy in bilateral feet.  at dinner, no insulin needed. Voiding spontaneously. Up with SBA. No acute events. Pt. rested comfortably in the chair most of the shift. Will continue POC and notify MD with changes.

## 2019-02-20 NOTE — PLAN OF CARE
A&O x 4. VSS 1L O2 via nasal canula. Lung sounds coarse. . Back pain controlled with prn Tramadol. IV SL. Up with stand by assist to the bathroom. Slept well. Discharge pending. Will continue to monitor

## 2019-02-20 NOTE — DISCHARGE SUMMARY
[facility]  Hospitalist Discharge Summary       Date of Admission:  2/13/2019  Date of Discharge:  2/20/2019  Discharging Provider: Wale Jeong MD      Discharge Diagnoses   Iron deficiency anemia likely secondary to colonic angioctasias  Acute on chronic diastolic heart failure with acute hypoxic respiratory failure  Acute on chronic renal insufficiency  Hypokalemia  Chronic A.fib  Type II DM  COPD    Follow-ups Needed After Discharge   Follow-up Appointments     Follow-up and recommended labs and tests       Follow up with primary care provider, Neisha Esparza, within 7 days for hospital follow- up.  The following labs/tests are recommended: basic metabolic panel and complete blood count.               Unresulted Labs Ordered in the Past 30 Days of this Admission     No orders found from 12/15/2018 to 2/14/2019.          Hospital Course      Helene Gibbs is a 83 year old female who was admitted on 2/13/2019 with anemia.     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 She is s/p 1 unit packed red blood cells on 2/13/19.    - received Venofer x 2 doses in hospital  - hemoglobin currently stable 10  - no signs of active bleeding at this time    Acute on chronic diastolic heart failure with acute hypoxic respiratory failure.   Patient developed hypoxia on morning on 2/16/19, likely due to IV fluids overnight.  She does not require chronic oxygen.  Chest x-ray 2/16/19 demonstrated bibasilar pulmonary edema.    - repeat CXR 2/18 shows improved bilateral effusions and pulm infiltrates  - TTE (2/17)shows LVEF of 50-55%, moderately decreased RV systolic function, moderately severe TR, severe pulm hypertension  - diuresed with IV Bumex with improvement. 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 Currently improved with diuresis, improved to 1.4    Hypokalemia. corrected after replacement     Chronic atrial fibrillation.    S/p prior ablations and pacemaker placement.   - discussed with Dr. Juarez on 2/18 regarding use of eliquis given above GI issues. Given her high [id number] score of 5, she is high risk for thromboembolism. Will continue low dose apixaban 2.5mg BID and will have EP follow up appointment made for her ( office will arrange) to discuss watchman procedure. She currently without signs of active bleeding  - continue with prior to admission metoprolol     Diabetes mellitus type 2.    - resume prior to admission glipizide     Bell's palsy.  Continue Lyrica     Hyperlipidemia.  Continue simvastatin.     COPD.  No evidence for acute exacerbation.  Encouraged patient to take Incruse Ellipta and continue as needed albuterol.      Consultations This Hospital Stay   GASTROENTEROLOGY IP CONSULT  CARE TRANSITION RN/SW IP CONSULT  PHYSICAL THERAPY ADULT IP CONSULT    Code Status   DNR/DNI    Time Spent on this Encounter   I, Wale Jeong, personally saw the patient today and spent 40 minutes discharging this patient.       Wale Jeong MD  [facility]  ______________________________________________________________________    Physical Exam   Vital Signs: Temp: 98.2  F (36.8  C) Temp src: Oral BP: 106/57   Heart Rate: 69 Resp: 16 SpO2: 92 % O2 Device: Nasal cannula Oxygen Delivery: 1 LPM  Weight: 163 lbs 6.4 oz  See exam in progress note       Primary Care Physician   Neisha Esparza    Discharge Disposition   Discharged to home  Condition at discharge: Stable    Significant Results and Procedures   Most Recent 3 CBC's:  Recent Labs   Lab Test 02/20/19  0820 02/18/19  0745 02/17/19  0752  02/14/19  0640   02/13/19  1247 02/11/19  2350   WBC  --   --   --   --  4.3  --  5.0 5.0   HGB 10.0* 8.6* 9.4*   < > 8.8*   < > 7.8* 8.4*   MCV  --   --   --   --  84  --  87 85   PLT  --   --   --   --  154  --  195 177    < > = values in this interval not displayed.     Most Recent 3 BMP's:  Recent Labs   Lab Test 02/20/19  0820 02/19/19  0809 02/18/19  0745    140 143   POTASSIUM 3.8 3.7 3.5   CHLORIDE 94 97 101   CO2 38* 36* 37*   BUN 26 27 30   CR 1.41* 1.51* 1.51*   ANIONGAP 6 7 5   GERARD 10.3* 9.8 9.4   * 134* 147*   ,   Results for orders placed or performed during the hospital encounter of 02/13/19   XR Chest 2 Views    Narrative    XR CHEST 2 VW 2/16/2019 11:45 AM    HISTORY: Hypoxia.     COMPARISON: December 24, 2018.       Impression    IMPRESSION: Left-sided cardiac device in place with leads unchanged in  position. There is diffuse bilateral interstitial prominence which  could reflect edema or chronic interstitial lung changes. There are  small bilateral effusions. Stable cardiomegaly. No pneumothorax.    JEFFERSON BERMUDEZ MD   XR Chest 2 Views    Narrative    CHEST TWO VIEWS  2/18/2019 8:44 AM     HISTORY:  Hypoxia.    COMPARISON: February 16, 2019       Impression    IMPRESSION: Minimal bilateral effusions and associated atelectasis  and/or infiltrate are improved since the comparison study. Cardiac  silhouette stable.    MABLE VIZCARRA MD     *Note: Due to a large number of results and/or encounters for the requested time period, some results have not been displayed. A complete set of results can be found in Results Review.       Discharge Orders      Home care nursing referral      Home Care PT Referral for Hospital Discharge      Home Care OT Referral for Hospital Discharge      Follow-up and recommended labs and tests     Follow up with primary care provider, Neisha Esparza, within 7 days for hospital follow- up.  The following labs/tests are recommended: basic metabolic panel and complete blood count.      Activity    Your activity upon discharge: activity as tolerated     MD face to face encounter    Documentation of Face to Face and Certification for Home Health Services    I certify that patient: Helene Gibbs is under my care and that I, or a nurse practitioner or physician's assistant working with me, had a face-to-face encounter that meets the physician face-to-face encounter requirements with this patient on: 2/20/2019.    This encounter with the patient was in whole, or in part, for the following medical condition, which is the primary reason for home health care: congestive heart failure, physical deconditioning    I certify that, based on my findings, the following services are medically necessary home health services: Nursing, Occupational Therapy and Physical Therapy.    My clinical findings support the need for the above services because: Nurse is needed: To assess respiratory status, tolerance of new supplemental oxygen after changes in medications or other medical regimen.., Occupational Therapy Services are needed to assess and treat cognitive ability and address ADL safety and evaluate home safety. and Physical Therapy Services are needed to assess and treat the following functional impairments: physical deconditioning    Further, I certify that my clinical findings support that this patient is homebound (i.e. absences from home require considerable and taxing effort and are for medical reasons or [facility] services or infrequently or of short duration when for other reasons) because: needs assistance to leave home and also dyspnea with prolonged activity.    Based on the above findings. I certify that this patient is confined to the home and needs intermittent skilled nursing care, physical therapy and/or speech therapy.  The patient is under my care, and I have initiated the establishment of the plan of care.  This patient will be followed by a physician who will periodically review the plan  of care.  Physician/Provider to provide follow up care: Neisha Esparza    Attending hospital physician (the Medicare certified [facility] provider): Wale Jeong  Physician Signature: See electronic signature associated with these discharge orders.  Date: 2/20/2019     DNR/DNI     Oxygen Adult    [employer] Oxygen Order 2 liter(s) by nasal cannula continuously with use of portable tank. Expected treatment length is indefinite (99 months).. Test on conserving device as applicable.    Patients who qualify for home O2 coverage under the CMS guidelines require ABG tests or O2 sat readings obtained closest to, but no earlier than 2 days prior to the discharge, as evidence of the need for home oxygen therapy. Testing must be performed while patient is in the chronic stable state. See notes for O2 sats.    I certify that this patient, Helene Gibbs has been under my care and that I, or a nurse practitioner or physician's assistant working with me, had a face-to-face encounter that meets the face-to-face encounter requirements with this patient on 2/20/2019. The patient, Helene Gibbs was evaluated or treated in whole, or in part, for the following medical condition, which necessitates the use of the ordered oxygen. Treatment Diagnosis: congestive heart failure    Attending Provider: Wale Jeong  Physician signature: See electronic signature associated with these discharge orders  Date of Order: February 20, 2019     Diet    Follow this diet upon discharge: Regular     Discharge Medications   Current Discharge Medication List      CONTINUE these medications which have CHANGED    Details   apixaban ANTICOAGULANT (ELIQUIS) 2.5 MG tablet Take 1 tablet (2.5 mg) by mouth 2 times daily  Qty: 60 tablet, Refills: 0    Associated Diagnoses: S/p left hip fracture         CONTINUE these medications which have NOT CHANGED    Details   albuterol (PROAIR HFA, PROVENTIL HFA, VENTOLIN HFA) 108 (90 BASE) MCG/ACT inhaler  Inhale 2 puffs into the lungs every 4 hours as needed for shortness of breath / dyspnea or wheezing  Qty: 1 Inhaler, Refills: 5    Associated Diagnoses: COPD (chronic obstructive pulmonary disease) (H)      denosumab (PROLIA) 60 MG/ML SOLN injection Inject 1 mL (60 mg) Subcutaneous every 6 months  Qty: 1 mL, Refills: 1    Associated Diagnoses: Age-related osteoporosis without current pathological fracture      glipiZIDE (GLUCOTROL) 5 MG tablet Take 1 tablet (5 mg) by mouth every morning (before breakfast)  Qty: 90 tablet, Refills: 0    Associated Diagnoses: Type 2 diabetes mellitus with diabetic nephropathy, without long-term current use of insulin (H)      hydrocortisone (CORTAID) 1 % external cream Apply topically 3 times daily as needed for itching      hydrOXYzine (VISTARIL) 25 MG capsule Take 1 capsule (25 mg) by mouth 2 times daily as needed for itching  Qty: 180 capsule, Refills: 1    Associated Diagnoses: Itching      hypromellose (ARTIFICIAL TEARS) 0.5 % SOLN ophthalmic solution Place 1 drop into both eyes every hour as needed for dry eyes      metoprolol tartrate (LOPRESSOR) 25 MG tablet Take 1 tablet (25 mg) by mouth 2 times daily  Qty: 180 tablet, Refills: 3    Comments: Profile Rx: patient will contact pharmacy when needed  Associated Diagnoses: NSTEMI (non-ST elevated myocardial infarction) (H)      ondansetron (ZOFRAN ODT) 4 MG ODT tab Take 1 tablet (4 mg) by mouth every 6 hours as needed for nausea  Qty: 30 tablet, Refills: 1    Associated Diagnoses: Nausea      pregabalin (LYRICA) 75 MG capsule Take 1 capsule (75 mg) by mouth 2 times daily  Qty: 60 capsule, Refills: 0    Associated Diagnoses: Neuropathy      ranitidine (ZANTAC) 150 MG tablet TAKE ONE TABLET BY MOUTH TWO TIMES A DAY  Qty: 180 tablet, Refills: 3    Associated Diagnoses: Dyspepsia      simvastatin (ZOCOR) 10 MG tablet Take 1 tablet (10 mg) by mouth At Bedtime  Qty: 90 tablet, Refills: 3    Associated Diagnoses: Type 2 diabetes mellitus  with diabetic nephropathy, without long-term current use of insulin (H)      tiotropium (SPIRIVA) 18 MCG inhaled capsule Inhale 18 mcg into the lungs daily      torsemide (DEMADEX) 20 MG tablet TAKE 1 TABLET BY MOUTH EVERY DAY  Qty: 90 tablet, Refills: 1    Associated Diagnoses: (HFpEF) heart failure with preserved ejection fraction (H)      ACCU-CHEK SMARTVIEW test strip USE 1 STRIP BY IN VITRO ROUTE 2 TIMES DAILY OR AS DIRECTED  Qty: 200 strip, Refills: 0    Associated Diagnoses: Type 2 diabetes mellitus with diabetic nephropathy (H)      blood glucose monitoring (NO BRAND SPECIFIED) meter device kit Accucheck Mariza meter; verified with pharmacist  Qty: 1 kit, Refills: 0    Comments: Pt reports machine is broke/not working again. Advised pt to bring machine into pharmacy to make sure she gets right dispensed machine.  Associated Diagnoses: Type 2 diabetes mellitus with diabetic nephropathy (H)         STOP taking these medications       ACE/ARB/ARNI NOT PRESCRIBED (INTENTIONAL) Comments:   Reason for Stopping:         traMADol (ULTRAM) 50 MG tablet Comments:   Reason for Stopping:             Allergies   Allergies   Allergen Reactions     Ambien [Zolpidem Tartrate] Visual Disturbance     Hallucinations and fell out of bed at night.     Penicillins Hives     Definity      Caused a syncopal episode.     Sulfa Drugs Itching     Cymbalta Rash     Fluconazole Rash     Tolerates miconazole suppositories

## 2019-02-20 NOTE — PROVIDER NOTIFICATION
MD Notification    Notified Person: MD    Notified Person Name:  Adenike    Notification Date/Time: 1110 2/20/19    Notification Interaction:    Purpose of Notification: Received IV Bumex 1 mg @ 1000, Started  Torsemide 20 mg today scheduled @ 1030. BP soft 106/57. Give or hold it?     Orders Received: Hold Torsemide 20 mg 1030 dose.     Comments:

## 2019-02-20 NOTE — PROGRESS NOTES
Patient has been assessed for Home Oxygen needs. Oxygen readings:    *Pulse oximetry (SpO2) = 86% on room air at rest while awake.    *SpO2 improved to 93% on 1liters/minute at rest.    *SpO2 = 85% on room air during activity/with exercise.    *SpO2 improved to 92% on 2liters/minute during activity/with exercise.

## 2019-02-20 NOTE — PROGRESS NOTES
Received intake call for home oxygen at 2:43PM. Reviewed patient's chart; Patient qualifies under Medicare guidelines.  All documentation is in patient's chart except signed F2F notes stating need for home O2.   3:10PM- Called and left message with care coordinator, Clarisa, confirmed we received the order, and informed her we are waiting on signed F2F notes from the doctor.  Once we receive the signed notes, we will deliver within the next 2 hours.  3:25PM- Verified patient's Medicare insurance and received alert that patient is receiving Home Health Care from [facility].  Called [facility] to verify patient was not receiving home O2 services through them.  Spoke to repRenee, who could not confirm but would message the on-call contact to call me back asap.  3:32PM- Called to offer choice and patient is okay with [facility] setting them up.  Patient informed me she was not discharging now until tomorrow morning.  Patient confirmed she is using [facility] for showers and PT only, no home O2.  Patient uses a walker and lives in [facility]. Discussed equipment options with patient in which she requested I call and speak to her niece, Enedelia, and the nurse at her [facility] to determine best option.  Told patient I would do so and we would be sure to deliver equipment to bedside right away in the morning.  3:41PM-[facility] on-call staff, Maude, called me and confirmed patient is not receiving home O2 services through [facility].  3:43PM-  Called and spoke to care coordinator, Clarisa, confirmed we now have signed notes from the doctor and can deliver equipment to patient.  Clarisa confirmed patient has transportation schedule for tomorrow at 9:15AM.  Told Clarisa I would be calling patient's niece, Enedelia, and RN at patient's [facility] to discuss equipment options and that we would deliver home O2 equipment right away in the morning so patient doesn't forget how to use equipment over night.  3:45PM- Called patient's  [facility] and spoke to RNMoraima ([phone]).  Moraima suggested I speak to patient's niece, Enedelia, to determine equipment options as patient will likely be needing more assistance from staff at [facility] and that would need to be discussed with patient and Enedelia.  3:55PM- Called patient's niece, Enedelia, and spoke to her about equipment options.  Enedelia decided it would be best to start with POC and if that doesn't well for patient after a couple days they would call to switch to the tank system.  Informed Enedelia we would deliver first thing in the morning after confirming patient is still discharging.  Enedelia agreed with this plan.  4:09PM- Called care coordinator, Clarisa, and left message informing her of equipment choice and that we will deliver to bedside first thing in the morning after confirming patient is still discharging at 9:15AM.  Left my direct number [phone] to call if she has questions.

## 2019-02-20 NOTE — PLAN OF CARE
BP soft 111/59 106/57 other VSS, O2 92% 1L NC, desat to mid 80s RA at rest. POND. Denies CP/SOB. A&Ox4. SBA with a walker/GB. Tolerating mod carb diet, good appetite. Blood surgars 132 and 171. Trace edema BLE, compression stocking on during days. IVSL. Received last dose of IV Bumex this morning, adequate UOP. Starting Torsemide 20 mg daily. Hgb 10.0 (8.6 on 2/18/19) no signs of active bleeding. Possible discharge today after PT eval. RN will cont to monitor    Addendum: 0615-4621 Blood sugar 139. Discharging back to her [facility] tomorrow, [facility] rid scheduled @ 0915.

## 2019-02-20 NOTE — PROGRESS NOTES
"BRIEF NUTRITION ASSESSMENT      REASON FOR ASSESSMENT:  Helene Gibbs is a 83 year old female seen by Registered Dietitian for [facility]      CURRENT DIET AND INTAKE:  Diet:  Moderate CHO              Chart reviewed per [facility] protocol  She has been ordering full meals  Flowsheets reflect pt has been consuming %  Breakfast today - eggs, trujillo, bagel, blueberry muffin, OJ, coffee    Visited with pt this morning  Wondering when she will get to go home  Notes good appetite  \"I think I have tried most items in the menu\"    ANTHROPOMETRICS:  Height: 5' 5\"  Weight:(2/20) 74.1 kg /  163 lbs 6.4 oz  Body mass index is 27.19 kg/m .   Weight Status: Overweight BMI 25-29.9  IBW:  56.8 kg  %IBW: 130%  Weight History:   Vitals:    02/13/19 1352 02/14/19 0548 02/18/19 1032 02/19/19 0620   Weight: 70.3 kg (155 lb) 70.8 kg (156 lb) 76 kg (167 lb 8.8 oz) 76.1 kg (167 lb 12.3 oz)    02/19/19 0843 02/20/19 0559 02/20/19 1033   Weight: 74.8 kg (164 lb 14.5 oz) 75.2 kg (165 lb 12.6 oz) 74.1 kg (163 lb 6.4 oz)     Wt Readings from Last 10 Encounters:   02/20/19 74.1 kg (163 lb 6.4 oz)   01/03/19 78 kg (171 lb 14.4 oz)   12/26/18 83.7 kg (184 lb 8 oz)   12/19/18 82.1 kg (181 lb)   11/19/18 82.6 kg (182 lb)   11/15/18 85.3 kg (188 lb)   11/13/18 83.9 kg (185 lb)   11/05/18 83.7 kg (184 lb 9.6 oz)   10/29/18 88.9 kg (195 lb 14.4 oz)   10/25/18 95.1 kg (209 lb 11.2 oz)         LABS:  Labs noted    MALNUTRITION:  Patient does not meet two of the following criteria necessary for diagnosing malnutrition: significant weight loss, reduced intake, subcutaneous fat loss, muscle loss or fluid retention    NUTRITION INTERVENTION:  Nutrition Diagnosis:  No nutrition diagnosis at this time.    Implementation:  Nutrition Education ---> Reviewed current diet order    FOLLOW UP/MONITORING:   Will re-evaluate in 7 - 10 days, or sooner, if re-consulted.          "

## 2019-02-20 NOTE — DISCHARGE INSTRUCTIONS
Oxygen Provider:  Arranged through [facility], contact number [phone].  If you have any questions or concerns please call the oxygen company directly.

## 2019-02-20 NOTE — PROGRESS NOTES
Care Transition   RN  Met with patient to discuss discharge plans. Patient stating she is feeling better,worked with therapy who recommends patient could return to [facility] with home care services. Spoke with RN Moraima [phone] ,orders faxed to [phone]. They would like patient to be at the facility before 6pm  Patient will have home o2 information sent to [facility]. They have the request and will delver o2 to her [facility] and bring one to hospital for transportation at discharge and patient requested transportation. Patient was willing to pay the private fare for [employer] [facility] [employer]. Noted ride available only at 10 pm. AL facility requesting patient be discharged in AM.[facility] ride set up for 915 am tomorrow.

## 2019-02-20 NOTE — PLAN OF CARE
A&Ox4. Pt. satting 92-95% on 1/2 L NC. Pt. denies SOB. Pt. denies pain and nausea. Course lung sounds throughout, crackles in bilateral lower lung bases. Baseline neuropathy in bilateral feet. Voiding spontaneously. Up with SBA. No acute events. Will continue POC.

## 2019-02-21 NOTE — PROGRESS NOTES
[facility]    Hospitalist Progress Note      Assessment & Plan   Helene Gibbs is a 83 year old female who was admitted on 2/13/2019 with anemia.     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 She is s/p 1 unit packed red blood cells on 2/13/19.    - received Venofer x 2 doses in hospital  - hemoglobin currently stable 10  - no reported melena or BRBPR currently    Acute on chronic diastolic heart failure with acute hypoxic respiratory failure.   Patient developed hypoxia on morning on 2/16/19, likely due to IV fluids overnight.  She does not require chronic oxygen.  Chest x-ray 2/16/19 demonstrated bibasilar pulmonary edema.    - repeat CXR 2/18 shows improved bilateral effusions and pulm infiltrates  - TTE (2/17)shows LVEF of 50-55%, moderately decreased RV systolic function, moderately severe TR, severe pulm hypertension  - received IV Bumex in hospital with improvement. Transitioned to PTA Torsemide 20mg daily  - unable to wean off O2 and will needing O2 at discharge    Acute on chronic renal insufficiency  Baseline cr 1.5-1.8  - cr 1.98 on admission. improved to 1.4 with diuresis    Hypokalemia.  Replaced as per protocol.     Chronic atrial fibrillation.    S/p prior ablations and pacemaker placement.   - discussed with Dr. Juarez on 2/18regarding use of eliquis. Given her high [id number] score of 5, she is high risk for thromboembolism. Will continue low dose apixaban 2.5mg BID and will have EP follow up appointment made for her ( office will arrange) to discuss watchman procedure.   - continue with metoprolol.      Diabetes mellitus type 2.    - resume glipizide at discharge     Bell's palsy.  Continue Lyrica and as needed tramadol.     Hyperlipidemia.  Continue simvastatin.     COPD.  No evidence for acute exacerbation.  Encouraged patient to  take Incruse Ellipta and continue as needed albuterol.    Communications: discussed with patient's RN     DVT Prophylaxis: Pneumatic Compression Devices  Code Status: DNR/DNI     Disposition: Expected discharge today--see discharge summary from 2/20/19    Wale Jeong MD  Text Page  (7am to 6pm)    Interval History   Unable to discharge to [facility] yesterday since not able to get earlier ride.  No acute issues overnight. Breathing is stable.       -Data reviewed today: I reviewed all new labs and imaging results over the last 24 hours. I personally reviewed no images or EKG's today.    Physical Exam   Temp: 98.6  F (37  C) Temp src: Oral BP: 115/63 Pulse: 70 Heart Rate: 70 Resp: 16 SpO2: 95 % O2 Device: Nasal cannula Oxygen Delivery: 1.5 LPM  Vitals:    02/20/19 0559 02/20/19 1033 02/21/19 0500   Weight: 75.2 kg (165 lb 12.6 oz) 74.1 kg (163 lb 6.4 oz) 74.6 kg (164 lb 6.4 oz)     Vital Signs with Ranges  Temp:  [97.5  F (36.4  C)-98.6  F (37  C)] 98.6  F (37  C)  Pulse:  [70] 70  Heart Rate:  [69-70] 70  Resp:  [16-18] 16  BP: (106-120)/(57-69) 115/63  SpO2:  [92 %-95 %] 95 %  I/O last 3 completed shifts:  In: 240 [P.O.:240]  Out: 1050 [Urine:1050]    Constitutional: Alert, awake and no  Apparent distress  Respiratory: CTA bilaterally, no wheezing  Cardiovascular: regular, +edema 1+ in LE  GI: soft and non-tender  Skin/Integumen: warm and dry       Medications       apixaban ANTICOAGULANT  2.5 mg Oral BID     insulin aspart  1-7 Units Subcutaneous TID AC     insulin aspart  1-5 Units Subcutaneous At Bedtime     metoprolol tartrate  25 mg Oral BID     polyethylene glycol  17 g Oral Daily     pregabalin  75 mg Oral BID     ranitidine  75 mg Oral BID     senna-docusate  2 tablet Oral BID     simvastatin  10 mg Oral At Bedtime     sodium chloride (PF)  3 mL Intracatheter Q8H     torsemide  20 mg Oral Daily     umeclidinium  1 puff Inhalation Daily       Data   Recent Labs   Lab 02/20/19  0820 02/19/19  0809  02/18/19  0745 02/17/19  0752   HGB 10.0*  --  8.6* 9.4*    140 143 141   POTASSIUM 3.8 3.7 3.5 4.1   CHLORIDE 94 97 101 100   CO2 38* 36* 37* 33*   BUN 26 27 30 34*   CR 1.41* 1.51* 1.51* 1.84*   ANIONGAP 6 7 5 8   GERARD 10.3* 9.8 9.4 9.6   * 134* 147* 142*       No results found for this or any previous visit (from the past 24 hour(s)).

## 2019-02-21 NOTE — PLAN OF CARE
Discharge    Patient discharged to [facility] via w/c with [facility]  Care plan note  VSS, O2 92% 1L NC, desat to mid 80s RA at rest. POND. Denies CP/SOB. A&Ox4. SBA with a walker/GB. Tolerating mod carb diet, good appetite. Blood surgar 120. Trace edema BLE, compression stocking on during days. IV access removed. Resumed PTA Torsemide 20 mg daily today. Hgb 10.0 on 2/20, no signs of active bleeding. Belongings packed and sent with the pt. Went through AVS, Pt verbalized understanding. Discharged back to [facility] via WC with [facility]. Pt left the floor @ 0920.      Listed belongings gathered and returned to patient. Yes  Care Plan and Patient education resolved: Yes  Prescriptions if needed, hard copies sent with patient  NA  Home and hospital acquired medications returned to patient: NA  Medication Bin checked and emptied on discharge Yes  Follow up appointment made for patient: Yes

## 2019-02-21 NOTE — PLAN OF CARE
VSS, 2L NC. POND. C/o lower back pain, tramadol given, A&Ox4. SBA with a walker/GB. Tolerating mod carb diet, good appetite. Blood surgars 154. Lung sound diminished with fine crackles in lower lobe,Trace edema BLE, compression stocking on during days. IVSL.Hgb 10.0 , no signs of active bleeding. Possible discharge tomorrow. RN will cont to monitor.

## 2019-02-21 NOTE — PLAN OF CARE
Pt A/O x 4, VSS on 1.5L per NC. POND. Pt denies pain, N/V and SOB. Slept well throughout shift. LS diminished, fine crackles in LL. Trace edema BLE. PIV SL. Most recent Hgb 10, no S/S of bleeding. Writer noticed red, warm lump on Right upper arm, near old IV site. Pt reports lump has been present for three days, and hurts from time to time. SBA with walker/gaitbelt. Mod carb diet, . Plan to discharge today. Will continue to monitor.

## 2019-02-21 NOTE — PROGRESS NOTES
8:08AM- Called  6th floor nursing station and confirmed that patient was still discharging home today at 9:15AM.  Will deliver home O2 equipment to bedside shortly.  8:28AM- POC delivered to bedside.

## 2019-02-22 NOTE — PROGRESS NOTES
SUBJECTIVE/OBJECTIVE:                Helene Gibbs is a 83 year old female called for a transitions of care visit.  She was discharged from [facility] on 2/20/19 for acute HF/anemia    Chief Complaint: Hospital Follow-Up.    Allergies/ADRs: Reviewed in Epic  Tobacco: History of tobacco dependence - quit 2018   Alcohol: none  Caffeine: 1-2 cups/day of coffee  Activity: limited since home  PMH: Reviewed in Epic    Medication Adherence/Access:  Patient takes medications directly from bottles.  Patient takes medications 2 time(s) per day.   Per patient, misses medication 0 times per week.   Medication barriers: none.   The patient fills medications at [place]: NO, fills medications at [facility].    HFpEF/AFib: Current medications include Eliquis 2.5 mg twice daily, metoprolol tartrate 25 mg twice daily, and torsemide 20 mg daily.  Patient reports no current medication side effects.  Scheduled to see provider at PCP clinic on 2/25/19 and cardiology that same day.  ECHO:  Date 2/17/19, EF 50-55%  Pt is not complaining of sx of HF. Maybe more short of breath when walking around without oxygen.  She does wear compression stockings.   Pt has not yet started measuring daily weights.  States she was back to normal weight.    Patient does not self-monitor BP.   Pt is following a low sodium diet, is avoiding EtOH.    Diabetes:  Pt currently taking glipizide IR 5 mg daily (usually with breakfast). Pt is not experiencing side effects. Is taking Lyrica 75 mg twice daily with minimal relief per patient.   SMBG: one time daily.   Ranges (patient reported): 100-140 mg/dL  Patient is not experiencing hypoglycemia  Recent symptoms of high blood sugar? none  Eye exam: due  Foot exam: due  ACEi/ARB: No.   Urine Albumin:   Lab Results   Component Value Date    UMALCR 135.40 (H) 01/25/2018   Aspirin: Not taking due to Eliquis  Diet/Exercise: Pt watches her diet closely. She does try to eat something if her blood sugars are  "below or close to 100 mg/dL.     Hyperlipidemia: Current therapy includes simvastatin 10 mg once daily.  Pt reports no significant myalgias or other side effects.    COPD: Current medications: Spiriva Handihaler 18 mcg daily and albuterol HFA PRN (has not used since home). She is on oxygen and she is frustrated that she had to start this since being in the hospital. She refers to how her oxygen \"was always in the 90s prior\". She does not want to remain on oxygen.   Pt is not experiencing side effects.   Pt reports the following symptoms: increased SOB upon exertion .  Pt does have an COPD Action Plan on file.   Has spirometry been completed: Yes   PIF was completed today: No    Pruritis: Pt is taking hydroxyzine 25 mg twice daily PRN.  States she uses about one a week with relief. Denies any known issues.     GERD: Current medications include: Zantac (ranitidine) 150 mg TWICE daily. Pt c/o no current symptoms.  Patient feels that current regimen is effective.  Estimated Creatinine Clearance: 31.6 mL/min (A) (based on SCr of 1.41 mg/dL (H)).    Osteoporosis: Pt has order for Prolia 60 mg every 6 months on file. Approved through the Prolia portal in 2018.  Has not undergone prior authorization for this year, but plans on asking about this at appointment on Monday.     Today's Vitals: LMP  (LMP Unknown)  - telephone encounter, no vitals    Lab Results   Component Value Date    A1C 5.5 10/19/2018    A1C 6.9 07/21/2018    A1C 6.8 03/22/2018    A1C 6.6 01/04/2018    A1C 6.7 08/10/2017     ASSESSMENT:                 Current medications were reviewed today.      Medication Adherence: good, no issues identified    HFpEF/AFib: Improving per patient. Follow-up in place. Dose of Eliquis is appropriately dose reduced based upon age >80 and SCr>1.5 mg/dL.     Diabetes: Stable. Patient is meeting A1c goal of < 7%.    Hyperlipidemia: Stable. Pt is on moderate intensity statin which is indicated based on 2013 ACC/AHA guidelines for " lipid management.      COPD: Needs Improvement. Unclear if breathing issues are related to fluid overload from heart failure or uncontrolled COPD. Would benefit from further work-up at follow-up.     Pruritis: Stable.     GERD: Needs Improvement. Pt would benefit from reducing ranitidine to once daily based upon renal function.     Osteoporosis: Pt to discuss scheduling with PCP's clinic.     PLAN:                  Post Discharge Medication Reconciliation Status: discharge medications reconciled and changed, per note/orders (see AVS).    Will send plan to ALLEN Reed CNP for consideration of the followin. Consider reducing ranitidine to 150 mg ONCE daily.     I spent 30 minutes with this patient today. I offer these suggestions for consideration by ALLEN Reed CNP. A copy of the visit note was provided to the patient's primary care provider.    Will follow up in 2-4 weeks.    The patient was mailed a summary of these recommendations as an after visit summary.        Wyatt Gonzalez, PharmD, BCACP  Medication Therapy Management Pharmacist  Pager: [phone]

## 2019-02-22 NOTE — PROGRESS NOTES
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 Esparza at [facility] on 2/25/19.      Home Care Contact:              Home Care Agency: [facility] ([phone]) will resume home care services.              Contact name () and phone number: Dayanna ([phone]).               Care Coordination contacted home care: Yes              Anticipated start of care date: Dayanna has not received her schedule as yet so she is not sure what date she will be seeing patient. Dayanna understands that P.T. is seeing patient this weekend.     Dayanna verified that patient [facility]    Plan: Patient lives in an assisted living facility. As patient is receiving services/care in assisted living--and is also receiving home care services under Medicare--[facility] will plan no further outreach at this time.    NICOLE Esposito  [facility] Care Coordination  Clinic: [facility]   Email: [email or web address]  Tele: [phone]

## 2019-02-22 NOTE — LETTER
[facility]  [address] [address]  [phone]      February 22, 2019    Helene Gibbs                                                                                                                     [address] [place] [address]      Dear Helene,    It was so nice to speak with you on 2/22/19.  I hope I was able to give you some useful information during our Medication Therapy Management (MTM) visit. The purpose of this visit with a clinical pharmacist was to review the medicines you received when discharged from the hospital. We want to make sure that you know which medicines to take and what they are for. We also want to make sure all your medicines are working, safe, and as easy to take as possible.    Based upon our conversation today continue to take your medications as prescribed. I did send a message to the nurse practitioner you were scheduled to see on 2/25/19 about adjusting the dose of your ranitidine based upon your kidney function. I will keep an eye on your chart to see if this was addressed. Also as we discussed start checking your weight every day to make sure your body is not holding onto extra fluid.     Feel free to call if you have any questions or concerns. By working together with you and your doctor, I hope to help you feel confident managing your medicines and improving your quality of life.       Best wishes,         Wyatt Gonzalez, PharmD, [facility]  Medication Therapy Management Pharmacist  Pager: [phone]

## 2019-02-25 PROBLEM — J96.21 ACUTE AND CHRONIC RESPIRATORY FAILURE WITH HYPOXIA (H): Status: RESOLVED | Noted: 2019-01-01 | Resolved: 2019-01-01

## 2019-02-25 PROBLEM — J96.10 CHRONIC RESPIRATORY FAILURE, UNSPECIFIED WHETHER WITH HYPOXIA OR HYPERCAPNIA (H): Status: ACTIVE | Noted: 2019-01-01

## 2019-02-25 NOTE — PROGRESS NOTES
SUBJECTIVE:   Helene Gibbs is a 83 year old female who presents to clinic today for the following health issues:    She uses wheelchair and special transportation.  Hospital Follow-up Visit:    Hospital/Nursing Home/IP Rehab Facility: [facility]  Date of Admission: 02/13/19  Date of Discharge: 02/21/19  Reason(s) for Admission: Heart Failure            Problems taking medications regularly:  None       Medication changes since discharge: None       Problems adhering to non-medication therapy:  None  Lian Jones MA    Summary of hospitalization:  [facility] discharge summary reviewed  Diagnostic Tests/Treatments reviewed.  Follow up needed: PCP  Other Healthcare Providers Involved in Patient s Care:         None  Update since discharge: improved.     Post Discharge Medication Reconciliation: discharge medications reconciled and changed, per note/orders (see AVS).  Plan of care communicated with patient     Coding guidelines for this visit:  Type of Medical   Decision Making Face-to-Face Visit       within 7 Days of discharge Face-to-Face Visit        within 14 days of discharge   Moderate Complexity [id number]   High Complexity [id number]        Patient lives in a two bedroom apartment  She has home healthcare come to her house  However, she doesn't live in assisted living  But that is easily available if she chose to move there in future   But she is getting all the services she needs and can get additional if needed  Not willing to go to assisted living at this point   She has [facility]    Problem list and histories reviewed & adjusted, as indicated.  Additional history: as documented    Medications and labs reviewed in EPIC    Reviewed and updated as needed this visit by clinical staff  Tobacco  Allergies  Meds  Problems  Med Hx  Surg Hx  Fam Hx       Reviewed and updated as needed this visit by Provider       ROS:  Constitutional, HEENT, cardiovascular, pulmonary, GI,  , musculoskeletal, neuro, skin, endocrine and psych systems are negative, except as otherwise noted.    This document serves as a record of the services and decisions personally performed and made by Neisha Esparza MD. It was created on her behalf by Cassandra Holcomb, a trained medical scribe. The creation of this document is based on the provider's statements to the medical scribe.  Cassandra Holcomb 12:50 PM February 25, 2019    OBJECTIVE:     /68 (BP Location: Right arm, Cuff Size: Adult Large)   Pulse 64   Temp 98  F (36.7  C) (Tympanic)   Wt 73.5 kg (162 lb)   LMP  (LMP Unknown)   SpO2 98%   BMI 26.96 kg/m    Body mass index is 26.96 kg/m .    GENERAL APPEARANCE: ambulated in wheelchair, alert and no distress  EYES: Eyes grossly normal to inspection, PERRL and conjunctivae and sclerae normal  HENT: ear canals and TM's normal and nose and mouth without ulcers or lesions  NECK: no adenopathy  RESP: lungs clear to auscultation - no rales, rhonchi or wheezes  CV: regular rates and rhythm, normal S1 S2, no S3  PSYCH: mentation appears normal, affect normal/bright    Diagnostic Test Results:  No results found. However, due to the size of the patient record, not all encounters were searched. Please check Results Review for a complete set of results.    ASSESSMENT/PLAN:     Helene was seen today for hospital f/u.    Diagnoses and all orders for this visit:    Anemia, unspecified type  -     Ferritin  -     CBC with platelets  Patient was admitted to [facility] on 02/13/19 for anemia  She was treated and discharged on 02/20/19  It was found that patient has colonic angiectasis  This explains her anemia  Colonoscopy didn't show any other changes  Patient had blood transfusions  Labs ordered today  Patient lives independently  We discussed about long term care options  She reports that she has home health care  Also endorses easy access to care, if needed  She has [facility]  She will need close  monitoring of hemoglobin.      Chronic lower GI bleeding  See above    Angioectopia  See above    Chronic obstructive pulmonary disease, unspecified COPD type (H)  Currently doing well  Compliant with medication  She is on oxygen at night  Oxygen levels are fine currently in clinic she needs mostly at night    Congestive heart failure, unspecified HF chronicity, unspecified heart failure type (H)  Currently  Well compensated but she had acute on chronic HF in the hospital  She developed hypoxia on 02/16/19    Peripheral vascular disease (H)  -     ASPIRIN NOT PRESCRIBED (INTENTIONAL); Please choose reason not prescribed, below  Stable    CKD (chronic kidney disease) stage 4, GFR 15-29 ml/min (H)  -     Comprehensive metabolic panel  Stable  Labs for re-check    Type 2 diabetes mellitus with diabetic nephropathy, without long-term current use of insulin (H)  -     Lipid panel reflex to direct LDL Non-fasting  Compliant with medication  Usually well-controlled  Lab Results   Component Value Date    A1C 5.5 10/19/2018    A1C 6.9 07/21/2018    A1C 6.8 03/22/2018    A1C 6.6 01/04/2018    A1C 6.7 08/10/2017       Patient Instructions   Labs today  Follow up in 6 weeks  Seek sooner medical attention if there is any worsening of symptoms or problems    The information in this document, created by the medical scribe for me, accurately reflects the services I personally performed and the decisions made by me. I have reviewed and approved this document for accuracy prior to leaving the patient care area.  February 25, 2019 1:14 PM    Neisha Esparza MD  [facility]

## 2019-02-25 NOTE — LETTER
2/25/2019    Neisha Esparza MD  [address]  [facility] [address]    RE: Helene Gibbs       Dear Colleague,    I had the pleasure of seeing Helene Gibbs in the [facility].    Electrophysiology/ Clinic Note         H&P and Plan:     REASON FOR VISIT:  Evaluation of atrial fibrillation and Watchman procedure.       HISTORY OF PRESENT ILLNESS: Rob is a delightful 83-year-old lady with a history of hypertension, diabetes, peripheral vascular disease, chronic kidney disease (baseline creatinine around 1.5), coronary artery disease, previous breast cancer, preserved EF heart failure and A. fib/flutter (status post pacemaker implantation and AV node ablation in 2012), who is here for consideration for watchman procedure.    Patient experienced a hip fracture last year.  She underwent orthopedic surgery in September and had to have a redo procedure in December.  In February, she had an episode of falling was evaluated in the ED.  Later in the month of February (February 13-20), he was readmitted with fatigue and severe anemia.  During hospital stay, she received a blood transfusion and a colonoscopy revealed angioectasis.  She was discharged with improved hemoglobin levels on (10), and anticoagulation with Eliquis was resumed.  She was referred here for consideration for watchman procedure.     At the moment, she is still recovering from recent admission.  She continues to be very fatigued and is wheelchair-bound.  She is currently on physical therapy.  She denies any other symptoms such as chest pain, shortness of breath, near-syncope or syncope.        ASSESSMENT AND PLAN:   1.  Persistent atrial fibrillation/ CHADS-VASc score of 6/high risk of bleeding in the fall.    We had an extensive discussion about the role of oral anticoagulation and the fact that she is high risk of bleeding due to colon issues.  We discussed about the watchman procedure.  I  "explained the procedure in details.  She understands there is a 3% risk in case associated procedure.    At this time, I would like to give her more time to get his strength back.  She should follow up with Angelica catheter is in about 1-2 months to reevaluate her overall clinical status.  Her clinical status improved we will consider her for watchman procedure.        Keyshawn Gomez MD    Physical Exam:  Vitals: BP (!) 138/93   Pulse 70   Ht 1.651 m (5' 5\")   Wt 73.5 kg (162 lb)   LMP  (LMP Unknown)   BMI 26.96 kg/m       Constitutional:  AAO x3.  Pt is in NAD.  HEAD: normocephalic.  SKIN: Skin normal color, texture and turgor with no lesions or eruptions.  Eyes: PERRL, EOMI.  ENT:  Supple, normal JVP. No lymphadenopathy or thyroid enlargement.  Chest:  CTAB.  Cardiac: RRR, normal  S1 and S2.  No murmurs rubs or gallop.    Abdomen:  Normal BS.  Soft, non-tender and non-distended.  No rebound or guarding.    Extremities:  Pedious pulses palpable B/L.   LE edema noticed (2+).   Neurological: Strength and sensation grossly symmetric and intact throughout.       CURRENT MEDICATIONS:  Current Outpatient Medications   Medication Sig Dispense Refill     ACCU-CHEK SMARTVIEW test strip USE 1 STRIP BY IN VITRO ROUTE 2 TIMES DAILY OR AS DIRECTED 200 strip 0     albuterol (PROAIR HFA, PROVENTIL HFA, VENTOLIN HFA) 108 (90 BASE) MCG/ACT inhaler Inhale 2 puffs into the lungs every 4 hours as needed for shortness of breath / dyspnea or wheezing 1 Inhaler 5     apixaban ANTICOAGULANT (ELIQUIS) 2.5 MG tablet Take 1 tablet (2.5 mg) by mouth 2 times daily 60 tablet 0     ASPIRIN NOT PRESCRIBED (INTENTIONAL) Please choose reason not prescribed, below       blood glucose monitoring (NO BRAND SPECIFIED) meter device kit Accucheck Mariza meter; verified with pharmacist 1 kit 0     denosumab (PROLIA) 60 MG/ML SOLN injection Inject 1 mL (60 mg) Subcutaneous every 6 months 1 mL 1     glipiZIDE (GLUCOTROL) 5 MG tablet Take 1 tablet (5 mg) by " mouth every morning (before breakfast) 90 tablet 0     hydrocortisone (CORTAID) 1 % external cream Apply topically 3 times daily as needed for itching       hydrOXYzine (VISTARIL) 25 MG capsule Take 1 capsule (25 mg) by mouth 2 times daily as needed for itching 180 capsule 1     hypromellose (ARTIFICIAL TEARS) 0.5 % SOLN ophthalmic solution Place 1 drop into both eyes every hour as needed for dry eyes       lidocaine (LIDODERM) 5 % patch        metoprolol tartrate (LOPRESSOR) 25 MG tablet Take 1 tablet (25 mg) by mouth 2 times daily 180 tablet 3     ondansetron (ZOFRAN ODT) 4 MG ODT tab Take 1 tablet (4 mg) by mouth every 6 hours as needed for nausea 30 tablet 1     pregabalin (LYRICA) 75 MG capsule Take 1 capsule (75 mg) by mouth 2 times daily 60 capsule 0     ranitidine (ZANTAC) 150 MG tablet TAKE ONE TABLET BY MOUTH TWO TIMES A  tablet 3     simvastatin (ZOCOR) 10 MG tablet Take 1 tablet (10 mg) by mouth At Bedtime 90 tablet 3     tiotropium (SPIRIVA) 18 MCG inhaled capsule Inhale 18 mcg into the lungs daily       torsemide (DEMADEX) 20 MG tablet TAKE 1 TABLET BY MOUTH EVERY DAY 90 tablet 1       ALLERGIES     Allergies   Allergen Reactions     Ambien [Zolpidem Tartrate] Visual Disturbance     Hallucinations and fell out of bed at night.     Penicillins Hives     Definity      Caused a syncopal episode.     Sulfa Drugs Itching     Cymbalta Rash     Fluconazole Rash     Tolerates miconazole suppositories       PAST MEDICAL HISTORY:  Past Medical History:   Diagnosis Date     Anemia      Ankle arthritis      Arthritis      Back pain      Bell's palsy 9/08    left     Breast CA (H) 2004-    left lumpectomy, radiation, -recurrence     Breast cancer (H) 2004    Left breast lumpectomy w LN disection, and radiation therapy     CKD (chronic kidney disease)     stage 3 baseline Cr 1.3     Colon polyps     colonoscopy-9/12-next due in 9/13     Congestive heart failure (H)      COPD (chronic obstructive pulmonary  disease) (H)      Coronary artery disease      DM (diabetes mellitus) (H)      GERD (gastroesophageal reflux disease)      Hyperlipidemia      Hypertension      Lumbar spinal stenosis     use cane     Nicotine dependence      Obesity      Osteoporosis      Other chronic pain      Pacemaker      Permanent atrial fibrillation (H) 8/2008    S/P AV node ablation and pacemaker 10-25-12       Pleural effusion      Pulmonary hypertension (H)      PVD (peripheral vascular disease) (H)      Rectal stenosis      Tobacco abuse     current     Tricuspid regurgitation        PAST SURGICAL HISTORY:  Past Surgical History:   Procedure Laterality Date     ARTHROPLASTY HIP Left 10/20/2018    Procedure: LEFT HIP HERACLIO-ARTHROPLASTY ;  Surgeon: Ish Fish MD;  Location:  OR     ARTHROSCOPY SHOULDER RT/LT  1999, 2004    Bilateral, right then left     BONE MARROW BIOPSY, BONE SPECIMEN, NEEDLE/TROCAR N/A 8/29/2017    Procedure: BIOPSY BONE MARROW;  BONE MARROW BIOPSY;  Surgeon: Marino Cohen MD;  Location: [facility]     C DEXA, BONE DENSITY, AXIAL SKEL       C MAMMOGRAM, SCREENING  1/2009     COLONOSCOPY N/A 10/7/2016    Procedure: COMBINED COLONOSCOPY, SINGLE OR MULTIPLE BIOPSY/POLYPECTOMY BY BIOPSY;  Surgeon: Cassandra Mccord MD;  Location:  GI     COLONOSCOPY N/A 2/15/2019    Procedure: COLONOSCOPY;  Surgeon: Juan Pablo Hayden DO;  Location:  GI     ESOPHAGOSCOPY, GASTROSCOPY, DUODENOSCOPY (EGD), COMBINED N/A 8/10/2017    Procedure: COMBINED ESOPHAGOSCOPY, GASTROSCOPY, DUODENOSCOPY (EGD), BIOPSY SINGLE OR MULTIPLE;  gastroscopy;  Surgeon: Helene Bustamante MD;  Location: [facility]     ESOPHAGOSCOPY, GASTROSCOPY, DUODENOSCOPY (EGD), COMBINED N/A 2/15/2019    Procedure: COMBINED ESOPHAGOSCOPY, GASTROSCOPY, DUODENOSCOPY (EGD);  Surgeon: Juan Pablo Hayden DO;  Location:  GI     H ABLATION AV NODE  10/2012     HC COLONOSCOPY THRU STOMA, DIAGNOSTIC  ? 2005     IMPLANT PACEMAKER  10/2012    [place] [id number], [id number]      IRRIGATION AND DEBRIDEMENT LOWER EXTREMITY, COMBINED Left 2018    Procedure: INCISION AND DRAINAGE OF LEFT HIP WITH EXCISION OF BONE FRAGMENT;  Surgeon: Ish Fish MD;  Location:  OR     JOINT REPLACEMTN, KNEE RT/LT      Joint Replacement knee bilateral     LAPAROSCOPIC CHOLECYSTECTOMY WITH CHOLANGIOGRAMS N/A 2015    Procedure: LAPAROSCOPIC CHOLECYSTECTOMY WITH CHOLANGIOGRAMS;  Surgeon: Ervin Amos MD;  Location:  OR     LUMPECTOMY BREAST      Left-     PHACOEMULSIFICATION CLEAR CORNEA WITH STANDARD INTRAOCULAR LENS IMPLANT Left 2015    Procedure: PHACOEMULSIFICATION CLEAR CORNEA WITH STANDARD INTRAOCULAR LENS IMPLANT;  Surgeon: Sai Obregon MD;  Location:  EC     PHACOEMULSIFICATION CLEAR CORNEA WITH TORIC INTRAOCULAR LENS IMPLANT Right 2017    Procedure: PHACOEMULSIFICATION CLEAR CORNEA WITH TORIC INTRAOCULAR LENS IMPLANT;  RIGHT EYE PHACOEMULSIFICATION CLEAR CORNEA WITH TORIC INTRAOCULAR LENS IMPLANT ;  Surgeon: Duc Richmond MD;  [facility]       FAMILY HISTORY:  Family History   Problem Relation Age of Onset     Hypertension Mother      Diabetes Mother          at 83 yrs     C.A.D. Father          age 70s     Breast Cancer No family hx of      Colon Cancer No family hx of        SOCIAL HISTORY:  Social History     Socioeconomic History     Marital status:      Spouse name: None     Number of children: 0     Years of education: None     Highest education level: None   Occupational History     None   Social Needs     Financial resource strain: None     Food insecurity:     Worry: None     Inability: None     Transportation needs:     Medical: None     Non-medical: None   Tobacco Use     Smoking status: Former Smoker     Packs/day: 0.25     Years: 45.00     Pack years: 11.25     Types: Cigarettes     Last attempt to quit: 2018     Years since quittin.7     Smokeless tobacco: Never Used   Substance and Sexual Activity     Alcohol  use: No     Alcohol/week: 0.0 oz     Drug use: No     Sexual activity: Not Currently     Partners: Female   Lifestyle     Physical activity:     Days per week: None     Minutes per session: None     Stress: None   Relationships     Social connections:     Talks on phone: None     Gets together: None     Attends Restoration service: None     Active member of club or organization: None     Attends meetings of clubs or organizations: None     Relationship status: None     Intimate partner violence:     Fear of current or ex partner: None     Emotionally abused: None     Physically abused: None     Forced sexual activity: None   Other Topics Concern     Parent/sibling w/ CABG, MI or angioplasty before 65F 55M? Not Asked      Service Not Asked     Blood Transfusions Not Asked     Caffeine Concern Yes     Comment: 2 cups/day     Occupational Exposure Not Asked     Hobby Hazards Not Asked     Sleep Concern Yes     Stress Concern Yes     Weight Concern Yes     Comment: 15+ lb weight loss since hospitalization      Special Diet Yes     Comment: bland diet r/t cholecystectomy, low salt      Back Care Not Asked     Exercise No     Bike Helmet Not Asked     Seat Belt Yes     Self-Exams Not Asked   Social History Narrative    ,no childrenPOA- niece-Enedelia silvestre.  Lived in [place] and [place] in [employer].  Desires DNR/DNI.  (last updated 12/19/2018)        Review of Systems:  Skin:        Eyes:       ENT:       Respiratory:       Cardiovascular:       Gastroenterology:      Genitourinary:       Musculoskeletal:       Neurologic:       Psychiatric:       Heme/Lymph/Imm:       Endocrine:           Recent Lab Results:  LIPID RESULTS:  Lab Results   Component Value Date    CHOL 126 01/18/2018    HDL 71 01/18/2018    LDL 40 01/18/2018    TRIG 76 01/18/2018    CHOLHDLRATIO 2.0 10/16/2015       LIVER ENZYME RESULTS:  Lab Results   Component Value Date    AST 6 12/19/2018    ALT 10 12/19/2018       CBC  RESULTS:  Lab Results   Component Value Date    WBC 4.3 02/14/2019    RBC 3.53 (L) 02/14/2019    HGB 10.0 (L) 02/20/2019    HCT 29.5 (L) 02/14/2019    MCV 84 02/14/2019    MCH 24.9 (L) 02/14/2019    MCHC 29.8 (L) 02/14/2019    RDW 19.7 (H) 02/14/2019     02/14/2019       BMP RESULTS:  Lab Results   Component Value Date     02/20/2019    POTASSIUM 3.8 02/20/2019    CHLORIDE 94 02/20/2019    CO2 38 (H) 02/20/2019    ANIONGAP 6 02/20/2019     (H) 02/20/2019    BUN 26 02/20/2019    CR 1.41 (H) 02/20/2019    GFRESTIMATED 35 (L) 02/20/2019    GFRESTBLACK 40 (L) 02/20/2019    GERARD 10.3 (H) 02/20/2019        A1C RESULTS:  Lab Results   Component Value Date    A1C 5.5 10/19/2018       INR RESULTS:  Lab Results   Component Value Date    INR 1.54 (H) 10/19/2018    INR 1.00 01/05/2018         ECHOCARDIOGRAM  No results found for this or any previous visit (from the past 8760 hour(s)).      No orders of the defined types were placed in this encounter.    No orders of the defined types were placed in this encounter.    There are no discontinued medications.      No diagnosis found.      CC  No referring provider defined for this encounter.                Thank you for allowing me to participate in the care of your patient.      Sincerely,     Keyshawn Gomez MD     [facility]    cc:   No referring provider defined for this encounter.

## 2019-02-25 NOTE — PATIENT INSTRUCTIONS
Labs today  Follow up in 6 weeks  Seek sooner medical attention if there is any worsening of symptoms or problems

## 2019-02-25 NOTE — PROGRESS NOTES
Electrophysiology/ Clinic Note         H&P and Plan:     REASON FOR VISIT:  Evaluation of atrial fibrillation and Watchman procedure.       HISTORY OF PRESENT ILLNESS: Rob is a delightful 83-year-old lady with a history of hypertension, diabetes, peripheral vascular disease, chronic kidney disease (baseline creatinine around 1.5), coronary artery disease, previous breast cancer, preserved EF heart failure and A. fib/flutter (status post pacemaker implantation and AV node ablation in 2012), who is here for consideration for watchman procedure.    Patient experienced a hip fracture last year.  She underwent orthopedic surgery in September and had to have a redo procedure in December.  In February, she had an episode of falling was evaluated in the ED.  Later in the month of February (February 13-20), he was readmitted with fatigue and severe anemia.  During hospital stay, she received a blood transfusion and a colonoscopy revealed angioectasis.  She was discharged with improved hemoglobin levels on (10), and anticoagulation with Eliquis was resumed.  She was referred here for consideration for watchman procedure.     At the moment, she is still recovering from recent admission.  She continues to be very fatigued and is wheelchair-bound.  She is currently on physical therapy.  She denies any other symptoms such as chest pain, shortness of breath, near-syncope or syncope.        ASSESSMENT AND PLAN:   1.  Persistent atrial fibrillation/ CHADS-VASc score of 6/high risk of bleeding in the fall.    We had an extensive discussion about the role of oral anticoagulation and the fact that she is high risk of bleeding due to colon issues.  We discussed about the watchman procedure.  I explained the procedure in details.  She understands there is a 3% risk in case associated procedure.    At this time, I would like to give her more time to get his strength back.  She should follow up with Angelica catheter is in about 1-2 months  "to reevaluate her overall clinical status.  Her clinical status improved we will consider her for watchman procedure.        Keyshawn Gomez MD    Physical Exam:  Vitals: BP (!) 138/93   Pulse 70   Ht 1.651 m (5' 5\")   Wt 73.5 kg (162 lb)   LMP  (LMP Unknown)   BMI 26.96 kg/m      Constitutional:  AAO x3.  Pt is in NAD.  HEAD: normocephalic.  SKIN: Skin normal color, texture and turgor with no lesions or eruptions.  Eyes: PERRL, EOMI.  ENT:  Supple, normal JVP. No lymphadenopathy or thyroid enlargement.  Chest:  CTAB.  Cardiac: RRR, normal  S1 and S2.  No murmurs rubs or gallop.    Abdomen:  Normal BS.  Soft, non-tender and non-distended.  No rebound or guarding.    Extremities:  Pedious pulses palpable B/L.   LE edema noticed (2+).   Neurological: Strength and sensation grossly symmetric and intact throughout.       CURRENT MEDICATIONS:  Current Outpatient Medications   Medication Sig Dispense Refill     ACCU-CHEK SMARTVIEW test strip USE 1 STRIP BY IN VITRO ROUTE 2 TIMES DAILY OR AS DIRECTED 200 strip 0     albuterol (PROAIR HFA, PROVENTIL HFA, VENTOLIN HFA) 108 (90 BASE) MCG/ACT inhaler Inhale 2 puffs into the lungs every 4 hours as needed for shortness of breath / dyspnea or wheezing 1 Inhaler 5     apixaban ANTICOAGULANT (ELIQUIS) 2.5 MG tablet Take 1 tablet (2.5 mg) by mouth 2 times daily 60 tablet 0     ASPIRIN NOT PRESCRIBED (INTENTIONAL) Please choose reason not prescribed, below       blood glucose monitoring (NO BRAND SPECIFIED) meter device kit Accucheck Mariza meter; verified with pharmacist 1 kit 0     denosumab (PROLIA) 60 MG/ML SOLN injection Inject 1 mL (60 mg) Subcutaneous every 6 months 1 mL 1     glipiZIDE (GLUCOTROL) 5 MG tablet Take 1 tablet (5 mg) by mouth every morning (before breakfast) 90 tablet 0     hydrocortisone (CORTAID) 1 % external cream Apply topically 3 times daily as needed for itching       hydrOXYzine (VISTARIL) 25 MG capsule Take 1 capsule (25 mg) by mouth 2 times daily as " needed for itching 180 capsule 1     hypromellose (ARTIFICIAL TEARS) 0.5 % SOLN ophthalmic solution Place 1 drop into both eyes every hour as needed for dry eyes       lidocaine (LIDODERM) 5 % patch        metoprolol tartrate (LOPRESSOR) 25 MG tablet Take 1 tablet (25 mg) by mouth 2 times daily 180 tablet 3     ondansetron (ZOFRAN ODT) 4 MG ODT tab Take 1 tablet (4 mg) by mouth every 6 hours as needed for nausea 30 tablet 1     pregabalin (LYRICA) 75 MG capsule Take 1 capsule (75 mg) by mouth 2 times daily 60 capsule 0     ranitidine (ZANTAC) 150 MG tablet TAKE ONE TABLET BY MOUTH TWO TIMES A  tablet 3     simvastatin (ZOCOR) 10 MG tablet Take 1 tablet (10 mg) by mouth At Bedtime 90 tablet 3     tiotropium (SPIRIVA) 18 MCG inhaled capsule Inhale 18 mcg into the lungs daily       torsemide (DEMADEX) 20 MG tablet TAKE 1 TABLET BY MOUTH EVERY DAY 90 tablet 1       ALLERGIES     Allergies   Allergen Reactions     Ambien [Zolpidem Tartrate] Visual Disturbance     Hallucinations and fell out of bed at night.     Penicillins Hives     Definity      Caused a syncopal episode.     Sulfa Drugs Itching     Cymbalta Rash     Fluconazole Rash     Tolerates miconazole suppositories       PAST MEDICAL HISTORY:  Past Medical History:   Diagnosis Date     Anemia      Ankle arthritis      Arthritis      Back pain      Bell's palsy 9/08    left     Breast CA (H) 2004-    left lumpectomy, radiation, -recurrence     Breast cancer (H) 2004    Left breast lumpectomy w LN disection, and radiation therapy     CKD (chronic kidney disease)     stage 3 baseline Cr 1.3     Colon polyps     colonoscopy-9/12-next due in 9/13     Congestive heart failure (H)      COPD (chronic obstructive pulmonary disease) (H)      Coronary artery disease      DM (diabetes mellitus) (H)      GERD (gastroesophageal reflux disease)      Hyperlipidemia      Hypertension      Lumbar spinal stenosis     use cane     Nicotine dependence      Obesity       Osteoporosis      Other chronic pain      Pacemaker      Permanent atrial fibrillation (H) 8/2008    S/P AV node ablation and pacemaker 10-25-12       Pleural effusion      Pulmonary hypertension (H)      PVD (peripheral vascular disease) (H)      Rectal stenosis      Tobacco abuse     current     Tricuspid regurgitation        PAST SURGICAL HISTORY:  Past Surgical History:   Procedure Laterality Date     ARTHROPLASTY HIP Left 10/20/2018    Procedure: LEFT HIP HERACLIO-ARTHROPLASTY ;  Surgeon: Ish Fish MD;  Location:  OR     ARTHROSCOPY SHOULDER RT/LT  1999, 2004    Bilateral, right then left     BONE MARROW BIOPSY, BONE SPECIMEN, NEEDLE/TROCAR N/A 8/29/2017    Procedure: BIOPSY BONE MARROW;  BONE MARROW BIOPSY;  Surgeon: Marino Cohen MD;  Location:  GI     C DEXA, BONE DENSITY, AXIAL SKEL       C MAMMOGRAM, SCREENING  1/2009     COLONOSCOPY N/A 10/7/2016    Procedure: COMBINED COLONOSCOPY, SINGLE OR MULTIPLE BIOPSY/POLYPECTOMY BY BIOPSY;  Surgeon: Cassandra Mccord MD;  Location:  GI     COLONOSCOPY N/A 2/15/2019    Procedure: COLONOSCOPY;  Surgeon: Juan Pablo Hayden DO;  Location:  GI     ESOPHAGOSCOPY, GASTROSCOPY, DUODENOSCOPY (EGD), COMBINED N/A 8/10/2017    Procedure: COMBINED ESOPHAGOSCOPY, GASTROSCOPY, DUODENOSCOPY (EGD), BIOPSY SINGLE OR MULTIPLE;  gastroscopy;  Surgeon: Helene Bustamante MD;  Location:  GI     ESOPHAGOSCOPY, GASTROSCOPY, DUODENOSCOPY (EGD), COMBINED N/A 2/15/2019    Procedure: COMBINED ESOPHAGOSCOPY, GASTROSCOPY, DUODENOSCOPY (EGD);  Surgeon: Juan Pablo Hayden DO;  Location:  GI     H ABLATION AV NODE  10/2012     HC COLONOSCOPY THRU STOMA, DIAGNOSTIC  ? 2005     IMPLANT PACEMAKER  10/2012    [place] [id number], [id number]     IRRIGATION AND DEBRIDEMENT LOWER EXTREMITY, COMBINED Left 12/21/2018    Procedure: INCISION AND DRAINAGE OF LEFT HIP WITH EXCISION OF BONE FRAGMENT;  Surgeon: Ish Fish MD;  Location:  OR     JOINT REPLACEMTN, KNEE RT/LT      Joint  Replacement knee bilateral     LAPAROSCOPIC CHOLECYSTECTOMY WITH CHOLANGIOGRAMS N/A 2015    Procedure: LAPAROSCOPIC CHOLECYSTECTOMY WITH CHOLANGIOGRAMS;  Surgeon: rEvin Amos MD;  Location:  OR     LUMPECTOMY BREAST      Left-     PHACOEMULSIFICATION CLEAR CORNEA WITH STANDARD INTRAOCULAR LENS IMPLANT Left 2015    Procedure: PHACOEMULSIFICATION CLEAR CORNEA WITH STANDARD INTRAOCULAR LENS IMPLANT;  Surgeon: Sai Obregon MD;  Location:  EC     PHACOEMULSIFICATION CLEAR CORNEA WITH TORIC INTRAOCULAR LENS IMPLANT Right 2017    Procedure: PHACOEMULSIFICATION CLEAR CORNEA WITH TORIC INTRAOCULAR LENS IMPLANT;  RIGHT EYE PHACOEMULSIFICATION CLEAR CORNEA WITH TORIC INTRAOCULAR LENS IMPLANT ;  Surgeon: Duc Richmond MD;  Location: [facility]       FAMILY HISTORY:  Family History   Problem Relation Age of Onset     Hypertension Mother      Diabetes Mother          at 83 yrs     C.A.D. Father          age 70s     Breast Cancer No family hx of      Colon Cancer No family hx of        SOCIAL HISTORY:  Social History     Socioeconomic History     Marital status:      Spouse name: None     Number of children: 0     Years of education: None     Highest education level: None   Occupational History     None   Social Needs     Financial resource strain: None     Food insecurity:     Worry: None     Inability: None     Transportation needs:     Medical: None     Non-medical: None   Tobacco Use     Smoking status: Former Smoker     Packs/day: 0.25     Years: 45.00     Pack years: 11.25     Types: Cigarettes     Last attempt to quit: 2018     Years since quittin.7     Smokeless tobacco: Never Used   Substance and Sexual Activity     Alcohol use: No     Alcohol/week: 0.0 oz     Drug use: No     Sexual activity: Not Currently     Partners: Female   Lifestyle     Physical activity:     Days per week: None     Minutes per session: None     Stress: None   Relationships     Social  connections:     Talks on phone: None     Gets together: None     Attends Judaism service: None     Active member of club or organization: None     Attends meetings of clubs or organizations: None     Relationship status: None     Intimate partner violence:     Fear of current or ex partner: None     Emotionally abused: None     Physically abused: None     Forced sexual activity: None   Other Topics Concern     Parent/sibling w/ CABG, MI or angioplasty before 65F 55M? Not Asked      Service Not Asked     Blood Transfusions Not Asked     Caffeine Concern Yes     Comment: 2 cups/day     Occupational Exposure Not Asked     Hobby Hazards Not Asked     Sleep Concern Yes     Stress Concern Yes     Weight Concern Yes     Comment: 15+ lb weight loss since hospitalization      Special Diet Yes     Comment: bland diet r/t cholecystectomy, low salt      Back Care Not Asked     Exercise No     Bike Helmet Not Asked     Seat Belt Yes     Self-Exams Not Asked   Social History Narrative    ,no childrenPOA- niece-Enedelia silvestre.  Lived in [place] and [place] worked in [employer].  Desires DNR/DNI.  (last updated 12/19/2018)        Review of Systems:  Skin:        Eyes:       ENT:       Respiratory:       Cardiovascular:       Gastroenterology:      Genitourinary:       Musculoskeletal:       Neurologic:       Psychiatric:       Heme/Lymph/Imm:       Endocrine:           Recent Lab Results:  LIPID RESULTS:  Lab Results   Component Value Date    CHOL 126 01/18/2018    HDL 71 01/18/2018    LDL 40 01/18/2018    TRIG 76 01/18/2018    CHOLHDLRATIO 2.0 10/16/2015       LIVER ENZYME RESULTS:  Lab Results   Component Value Date    AST 6 12/19/2018    ALT 10 12/19/2018       CBC RESULTS:  Lab Results   Component Value Date    WBC 4.3 02/14/2019    RBC 3.53 (L) 02/14/2019    HGB 10.0 (L) 02/20/2019    HCT 29.5 (L) 02/14/2019    MCV 84 02/14/2019    MCH 24.9 (L) 02/14/2019    MCHC 29.8 (L) 02/14/2019    RDW 19.7 (H)  02/14/2019     02/14/2019       BMP RESULTS:  Lab Results   Component Value Date     02/20/2019    POTASSIUM 3.8 02/20/2019    CHLORIDE 94 02/20/2019    CO2 38 (H) 02/20/2019    ANIONGAP 6 02/20/2019     (H) 02/20/2019    BUN 26 02/20/2019    CR 1.41 (H) 02/20/2019    GFRESTIMATED 35 (L) 02/20/2019    GFRESTBLACK 40 (L) 02/20/2019    GERARD 10.3 (H) 02/20/2019        A1C RESULTS:  Lab Results   Component Value Date    A1C 5.5 10/19/2018       INR RESULTS:  Lab Results   Component Value Date    INR 1.54 (H) 10/19/2018    INR 1.00 01/05/2018         ECHOCARDIOGRAM  No results found for this or any previous visit (from the past 8760 hour(s)).      No orders of the defined types were placed in this encounter.    No orders of the defined types were placed in this encounter.    There are no discontinued medications.      No diagnosis found.      CC  No referring provider defined for this encounter.

## 2019-02-25 NOTE — LETTER
2/25/2019    Neisha Esparza MD  [address]  [facility] [address]    RE: Helene Gibbs       Dear Colleague,    I had the pleasure of seeing Helene Gibbs in the [facility].    Electrophysiology/ Clinic Note         H&P and Plan:     REASON FOR VISIT:  Evaluation of atrial fibrillation and Watchman procedure.       HISTORY OF PRESENT ILLNESS: Rob is a delightful 83-year-old lady with a history of hypertension, diabetes, peripheral vascular disease, chronic kidney disease (baseline creatinine around 1.5), coronary artery disease, previous breast cancer, preserved EF heart failure and A. fib/flutter (status post pacemaker implantation and AV node ablation in 2012), who is here for consideration for watchman procedure.    Patient experienced a hip fracture last year.  She underwent orthopedic surgery in September and had to have a redo procedure in December.  In February, she had an episode of falling was evaluated in the ED.  Later in the month of February (February 13-20), he was readmitted with fatigue and severe anemia.  During hospital stay, she received a blood transfusion and a colonoscopy revealed angioectasis.  She was discharged with improved hemoglobin levels on (10), and anticoagulation with Eliquis was resumed.  She was referred here for consideration for watchman procedure.     At the moment, she is still recovering from recent admission.  She continues to be very fatigued and is wheelchair-bound.  She is currently on physical therapy.  She denies any other symptoms such as chest pain, shortness of breath, near-syncope or syncope.        ASSESSMENT AND PLAN:   1.  Persistent atrial fibrillation/ CHADS-VASc score of 6/high risk of bleeding in the fall.    We had an extensive discussion about the role of oral anticoagulation and the fact that she is high risk of bleeding due to colon issues.  We discussed about the watchman procedure.  I  "explained the procedure in details.  She understands there is a 3% risk in case associated procedure.    At this time, I would like to give her more time to get his strength back.  She should follow up with Angelica catheter is in about 1-2 months to reevaluate her overall clinical status.  Her clinical status improved we will consider her for watchman procedure.        Keyshawn Gomez MD    Physical Exam:  Vitals: BP (!) 138/93   Pulse 70   Ht 1.651 m (5' 5\")   Wt 73.5 kg (162 lb)   LMP  (LMP Unknown)   BMI 26.96 kg/m       Constitutional:  AAO x3.  Pt is in NAD.  HEAD: normocephalic.  SKIN: Skin normal color, texture and turgor with no lesions or eruptions.  Eyes: PERRL, EOMI.  ENT:  Supple, normal JVP. No lymphadenopathy or thyroid enlargement.  Chest:  CTAB.  Cardiac: RRR, normal  S1 and S2.  No murmurs rubs or gallop.    Abdomen:  Normal BS.  Soft, non-tender and non-distended.  No rebound or guarding.    Extremities:  Pedious pulses palpable B/L.   LE edema noticed (2+).   Neurological: Strength and sensation grossly symmetric and intact throughout.       CURRENT MEDICATIONS:  Current Outpatient Medications   Medication Sig Dispense Refill     ACCU-CHEK SMARTVIEW test strip USE 1 STRIP BY IN VITRO ROUTE 2 TIMES DAILY OR AS DIRECTED 200 strip 0     albuterol (PROAIR HFA, PROVENTIL HFA, VENTOLIN HFA) 108 (90 BASE) MCG/ACT inhaler Inhale 2 puffs into the lungs every 4 hours as needed for shortness of breath / dyspnea or wheezing 1 Inhaler 5     apixaban ANTICOAGULANT (ELIQUIS) 2.5 MG tablet Take 1 tablet (2.5 mg) by mouth 2 times daily 60 tablet 0     ASPIRIN NOT PRESCRIBED (INTENTIONAL) Please choose reason not prescribed, below       blood glucose monitoring (NO BRAND SPECIFIED) meter device kit Accucheck Mariza meter; verified with pharmacist 1 kit 0     denosumab (PROLIA) 60 MG/ML SOLN injection Inject 1 mL (60 mg) Subcutaneous every 6 months 1 mL 1     glipiZIDE (GLUCOTROL) 5 MG tablet Take 1 tablet (5 mg) by " mouth every morning (before breakfast) 90 tablet 0     hydrocortisone (CORTAID) 1 % external cream Apply topically 3 times daily as needed for itching       hydrOXYzine (VISTARIL) 25 MG capsule Take 1 capsule (25 mg) by mouth 2 times daily as needed for itching 180 capsule 1     hypromellose (ARTIFICIAL TEARS) 0.5 % SOLN ophthalmic solution Place 1 drop into both eyes every hour as needed for dry eyes       lidocaine (LIDODERM) 5 % patch        metoprolol tartrate (LOPRESSOR) 25 MG tablet Take 1 tablet (25 mg) by mouth 2 times daily 180 tablet 3     ondansetron (ZOFRAN ODT) 4 MG ODT tab Take 1 tablet (4 mg) by mouth every 6 hours as needed for nausea 30 tablet 1     pregabalin (LYRICA) 75 MG capsule Take 1 capsule (75 mg) by mouth 2 times daily 60 capsule 0     ranitidine (ZANTAC) 150 MG tablet TAKE ONE TABLET BY MOUTH TWO TIMES A  tablet 3     simvastatin (ZOCOR) 10 MG tablet Take 1 tablet (10 mg) by mouth At Bedtime 90 tablet 3     tiotropium (SPIRIVA) 18 MCG inhaled capsule Inhale 18 mcg into the lungs daily       torsemide (DEMADEX) 20 MG tablet TAKE 1 TABLET BY MOUTH EVERY DAY 90 tablet 1       ALLERGIES     Allergies   Allergen Reactions     Ambien [Zolpidem Tartrate] Visual Disturbance     Hallucinations and fell out of bed at night.     Penicillins Hives     Definity      Caused a syncopal episode.     Sulfa Drugs Itching     Cymbalta Rash     Fluconazole Rash     Tolerates miconazole suppositories       PAST MEDICAL HISTORY:  Past Medical History:   Diagnosis Date     Anemia      Ankle arthritis      Arthritis      Back pain      Bell's palsy 9/08    left     Breast CA (H) 2004-    left lumpectomy, radiation, -recurrence     Breast cancer (H) 2004    Left breast lumpectomy w LN disection, and radiation therapy     CKD (chronic kidney disease)     stage 3 baseline Cr 1.3     Colon polyps     colonoscopy-9/12-next due in 9/13     Congestive heart failure (H)      COPD (chronic obstructive pulmonary  disease) (H)      Coronary artery disease      DM (diabetes mellitus) (H)      GERD (gastroesophageal reflux disease)      Hyperlipidemia      Hypertension      Lumbar spinal stenosis     use cane     Nicotine dependence      Obesity      Osteoporosis      Other chronic pain      Pacemaker      Permanent atrial fibrillation (H) 8/2008    S/P AV node ablation and pacemaker 10-25-12       Pleural effusion      Pulmonary hypertension (H)      PVD (peripheral vascular disease) (H)      Rectal stenosis      Tobacco abuse     current     Tricuspid regurgitation        PAST SURGICAL HISTORY:  Past Surgical History:   Procedure Laterality Date     ARTHROPLASTY HIP Left 10/20/2018    Procedure: LEFT HIP HERACLIO-ARTHROPLASTY ;  Surgeon: Ish Fish MD;  Location:  OR     ARTHROSCOPY SHOULDER RT/LT  1999, 2004    Bilateral, right then left     BONE MARROW BIOPSY, BONE SPECIMEN, NEEDLE/TROCAR N/A 8/29/2017    Procedure: BIOPSY BONE MARROW;  BONE MARROW BIOPSY;  Surgeon: Marino Cohen MD;  Location: [facility]     C DEXA, BONE DENSITY, AXIAL SKEL       C MAMMOGRAM, SCREENING  1/2009     COLONOSCOPY N/A 10/7/2016    Procedure: COMBINED COLONOSCOPY, SINGLE OR MULTIPLE BIOPSY/POLYPECTOMY BY BIOPSY;  Surgeon: Cassandra Mccord MD;  Location:  GI     COLONOSCOPY N/A 2/15/2019    Procedure: COLONOSCOPY;  Surgeon: Juan Pablo Hayden DO;  Location:  GI     ESOPHAGOSCOPY, GASTROSCOPY, DUODENOSCOPY (EGD), COMBINED N/A 8/10/2017    Procedure: COMBINED ESOPHAGOSCOPY, GASTROSCOPY, DUODENOSCOPY (EGD), BIOPSY SINGLE OR MULTIPLE;  gastroscopy;  Surgeon: Helene Bustamante MD;  Location: [facility]     ESOPHAGOSCOPY, GASTROSCOPY, DUODENOSCOPY (EGD), COMBINED N/A 2/15/2019    Procedure: COMBINED ESOPHAGOSCOPY, GASTROSCOPY, DUODENOSCOPY (EGD);  Surgeon: Juan Pablo Hayden DO;  Location:  GI     H ABLATION AV NODE  10/2012     HC COLONOSCOPY THRU STOMA, DIAGNOSTIC  ? 2005     IMPLANT PACEMAKER  10/2012    [place] [id number], [id number]      IRRIGATION AND DEBRIDEMENT LOWER EXTREMITY, COMBINED Left 2018    Procedure: INCISION AND DRAINAGE OF LEFT HIP WITH EXCISION OF BONE FRAGMENT;  Surgeon: Ish Fish MD;  Location:  OR     JOINT REPLACEMTN, KNEE RT/LT      Joint Replacement knee bilateral     LAPAROSCOPIC CHOLECYSTECTOMY WITH CHOLANGIOGRAMS N/A 2015    Procedure: LAPAROSCOPIC CHOLECYSTECTOMY WITH CHOLANGIOGRAMS;  Surgeon: Ervin Amos MD;  Location:  OR     LUMPECTOMY BREAST      Left-     PHACOEMULSIFICATION CLEAR CORNEA WITH STANDARD INTRAOCULAR LENS IMPLANT Left 2015    Procedure: PHACOEMULSIFICATION CLEAR CORNEA WITH STANDARD INTRAOCULAR LENS IMPLANT;  Surgeon: Sai Obregon MD;  Location:  EC     PHACOEMULSIFICATION CLEAR CORNEA WITH TORIC INTRAOCULAR LENS IMPLANT Right 2017    Procedure: PHACOEMULSIFICATION CLEAR CORNEA WITH TORIC INTRAOCULAR LENS IMPLANT;  RIGHT EYE PHACOEMULSIFICATION CLEAR CORNEA WITH TORIC INTRAOCULAR LENS IMPLANT ;  Surgeon: Duc Richmond MD;  Location: [facility]       FAMILY HISTORY:  Family History   Problem Relation Age of Onset     Hypertension Mother      Diabetes Mother          at 83 yrs     C.A.D. Father          age 70s     Breast Cancer No family hx of      Colon Cancer No family hx of        SOCIAL HISTORY:  Social History     Socioeconomic History     Marital status:      Spouse name: None     Number of children: 0     Years of education: None     Highest education level: None   Occupational History     None   Social Needs     Financial resource strain: None     Food insecurity:     Worry: None     Inability: None     Transportation needs:     Medical: None     Non-medical: None   Tobacco Use     Smoking status: Former Smoker     Packs/day: 0.25     Years: 45.00     Pack years: 11.25     Types: Cigarettes     Last attempt to quit: 2018     Years since quittin.7     Smokeless tobacco: Never Used   Substance and Sexual Activity     Alcohol  use: No     Alcohol/week: 0.0 oz     Drug use: No     Sexual activity: Not Currently     Partners: Female   Lifestyle     Physical activity:     Days per week: None     Minutes per session: None     Stress: None   Relationships     Social connections:     Talks on phone: None     Gets together: None     Attends Congregation service: None     Active member of club or organization: None     Attends meetings of clubs or organizations: None     Relationship status: None     Intimate partner violence:     Fear of current or ex partner: None     Emotionally abused: None     Physically abused: None     Forced sexual activity: None   Other Topics Concern     Parent/sibling w/ CABG, MI or angioplasty before 65F 55M? Not Asked      Service Not Asked     Blood Transfusions Not Asked     Caffeine Concern Yes     Comment: 2 cups/day     Occupational Exposure Not Asked     Hobby Hazards Not Asked     Sleep Concern Yes     Stress Concern Yes     Weight Concern Yes     Comment: 15+ lb weight loss since hospitalization      Special Diet Yes     Comment: bland diet r/t cholecystectomy, low salt      Back Care Not Asked     Exercise No     Bike Helmet Not Asked     Seat Belt Yes     Self-Exams Not Asked   Social History Narrative    ,no childrenPOA- niece-Enedelia silvestre.  Lived in [place] and [place] worked in [employer].  Desires DNR/DNI.  (last updated 12/19/2018)        Review of Systems:  Skin:        Eyes:       ENT:       Respiratory:       Cardiovascular:       Gastroenterology:      Genitourinary:       Musculoskeletal:       Neurologic:       Psychiatric:       Heme/Lymph/Imm:       Endocrine:           Recent Lab Results:  LIPID RESULTS:  Lab Results   Component Value Date    CHOL 126 01/18/2018    HDL 71 01/18/2018    LDL 40 01/18/2018    TRIG 76 01/18/2018    CHOLHDLRATIO 2.0 10/16/2015       LIVER ENZYME RESULTS:  Lab Results   Component Value Date    AST 6 12/19/2018    ALT 10 12/19/2018       CBC  RESULTS:  Lab Results   Component Value Date    WBC 4.3 02/14/2019    RBC 3.53 (L) 02/14/2019    HGB 10.0 (L) 02/20/2019    HCT 29.5 (L) 02/14/2019    MCV 84 02/14/2019    MCH 24.9 (L) 02/14/2019    MCHC 29.8 (L) 02/14/2019    RDW 19.7 (H) 02/14/2019     02/14/2019       BMP RESULTS:  Lab Results   Component Value Date     02/20/2019    POTASSIUM 3.8 02/20/2019    CHLORIDE 94 02/20/2019    CO2 38 (H) 02/20/2019    ANIONGAP 6 02/20/2019     (H) 02/20/2019    BUN 26 02/20/2019    CR 1.41 (H) 02/20/2019    GFRESTIMATED 35 (L) 02/20/2019    GFRESTBLACK 40 (L) 02/20/2019    GERARD 10.3 (H) 02/20/2019        A1C RESULTS:  Lab Results   Component Value Date    A1C 5.5 10/19/2018       INR RESULTS:  Lab Results   Component Value Date    INR 1.54 (H) 10/19/2018    INR 1.00 01/05/2018         ECHOCARDIOGRAM  No results found for this or any previous visit (from the past 8760 hour(s)).      No orders of the defined types were placed in this encounter.    No orders of the defined types were placed in this encounter.    There are no discontinued medications.      No diagnosis found.        Thank you for allowing me to participate in the care of your patient.    Sincerely,     Keyshawn Gomez MD     [facility]

## 2019-02-26 NOTE — RESULT ENCOUNTER NOTE
Shakira Narayan,    This is to inform you regarding your test result.    Ferritin which is iron stores in the body is normal.      Sincerely,      Dr.Nasima Vilma MD,FACP

## 2019-02-26 NOTE — TELEPHONE ENCOUNTER
Reason for Call:  Request for results:    Name of test or procedure: labs/tests     Date of test of procedure: 02/25/19    Location of the test or procedure: edd WHITE to leave the result message on voice mail or with a family member? YES    Phone number Patient can be reached at:  Home number on file [phone] (home)    Additional comments: Pt would like a call back with any and all results from 2/25/19    Call taken on 2/26/2019 at 1:27 PM by Seble Loja

## 2019-02-26 NOTE — RESULT ENCOUNTER NOTE
Shakira Narayan,    This is to inform you regarding your test result.    You hemoglobin is 10 and stable   Other results are pending.    Sincerely,      Dr.Nasima Vilma MD,FACP

## 2019-02-26 NOTE — TELEPHONE ENCOUNTER
I notified the patient regarding her lab results  Her CBC and ferritin was notified  Ferritin is normal and hemoglobin is 10 and is stable  Other results are pending  I also discussed with her regarding recommendation from medical therapy management team that she should be on ranitidine once a day not twice a day  This is due to her kidney function  She is in agreement  Will recheck in 1 month  Dr.Nasima Vilma MD

## 2019-02-27 NOTE — RESULT ENCOUNTER NOTE
Shakira Narayan,    This is to inform you regarding your test result.    Your total cholesterol is normal.  HDL which is called good cholesterol is low.  Your LDL cholesterol is normal.  This is often call bad cholesterol and high levels increase the risk for heart attacks and strokes.  Your triglycerides are normal.  Your creatinine has improved compared to before.    Sincerely,      Dr.Nasima Vilma MD,FACP

## 2019-03-01 NOTE — LETTER
[address] [facility]  [address]  [place]  [address]  Tel: [phone]    April 1, 2019    Helene Gibbs  [address] [facility] [address]        Dear MsMaryam Gibbs,    Enclosed are your results.    If you have any further questions or problems, please contact our office.      Sincerely,    Patricia Sheehan NP/ Surekha Fairchild CMA  Results for orders placed or performed in visit on 03/01/19   XR Chest 2 Views    Narrative    XR CHEST 2 VW 3/1/2019 2:21 PM    HISTORY: Short of breath.    COMPARISON: Several prior studies, the most recent one being dated  2/18/2019.      Impression    IMPRESSION: 2 views of the chest show stable suspected small pleural  effusions. Pulmonary vessels are congested and there is some  interstitial prominence within the lungs similar to recent comparison  studies. While this could in part be chronic, an ongoing mild  component of interstitial pulmonary edema related to CHF needs to be  considered. Heart size and transvenous pacer are stable.     CATHRYN CALVERT MD     *Note: Due to a large number of results and/or encounters for the requested time period, some results have not been displayed. A complete set of results can be found in Results Review.               Enclosure: Lab Results

## 2019-03-01 NOTE — TELEPHONE ENCOUNTER
If nurse calls back today (Friday) ok to [facility] triage. Just want to make sure pt will be seen by someone today     Gave verbal for below to homecare, informed pt may be coming in today, and they have noon visit planned, they will touch base with pt to make sure she is coming in.     She asked for AVS to be faxed for their FYI to [phone] will add to OV notes.  Brandi Hinojosa RN

## 2019-03-01 NOTE — TELEPHONE ENCOUNTER
Reason for Call:  Home Health Care    [facility] called regarding (reason for call):     Orders are needed for this patient.     PT:     OT:     Skilled Nursing: 2x1 week, 1x1 week    Pt Provider: looking for parameters on vital signs since recent cardiac issues    Home health aid: 1x1 week starting 3.3.19    Phone Number Homecare Nurse can be reached at: [phone]    Can we leave a detailed message on this number? YES    Best Time: any    Call taken on 3/1/2019 at 10:12 AM by Cecilia Baptiste

## 2019-03-01 NOTE — PROGRESS NOTES
SUBJECTIVE:   Helene Gibbs is a 83 year old female who presents to clinic today for the following health issues:      Follow up - SOB  Helene was recently hospitalized for :  Discharge Diagnoses     Iron deficiency anemia likely secondary to colonic angioctasias- transfusion given- 2/25  hgb 10.0; ferritin 89  Acute on chronic diastolic heart failure with acute hypoxic respiratory failure  Acute on chronic renal insufficiency- 2/25  GFR 37  Hypokalemia  Chronic A.fib  Type II DM  COPD- using O2 at night and occasionally during the day; not on O2 in clinic; ambulates at home with a walker     She subsequently had post discharge follow up with her PCP   And Dr Gomez to discuss Watchman procedure    She is frustrated because she is still very fatigued at home   is getting in home physical therapy and thinks she is doing well with it    Problem list and histories reviewed & adjusted, as indicated.  Additional history: as documented    Patient Active Problem List   Diagnosis     Type 2 diabetes mellitus with diabetic nephropathy (H)     Anemia     CKD (chronic kidney disease) stage 3, GFR 30-59 ml/min (H)     Chronic low back pain     Pleural effusion, left     Adjustment reaction with anxiety and depression     c diff colitis 11-12-12     Pulmonary hypertension (H)     Melanosis of colon     Diplopia     COPD (chronic obstructive pulmonary disease) (H)     Elevated TSH     CHF (congestive heart failure) (H)     Cardiac pacemaker in situ     Neck pain     Intractable back pain     Health Care Home     Mild cognitive impairment SLUMS = 22/ 30  CPT = 5.0/ 5.5 7-2014     Hypertension goal BP (blood pressure) < 140/90     Depression with anxiety     Tobacco abuse: [age]y/o on 8-12-14 @ 1ppd=50 pk yr hx      Stasis dermatitis     Thoracic disc herniation     Obesity     Hyperlipidemia     Nausea and vomiting     Cholelithiasis with acute on chronic cholecystitis     Slow transit constipation     Gastroesophageal reflux  disease without esophagitis     Permanent atrial fibrillation (H)     Basal cell carcinoma of skin     Chronic pain syndrome     Tubular adenoma     Chronic atrial fib, S/P ablation -- on Eliquis     Gastrointestinal hemorrhage, unspecified gastrointestinal hemorrhage type     Iron deficiency     Adverse effect of iron     History of bone marrow biopsy     Herpes zoster without complication     Controlled substance agreement signed     Other chronic pain     Hematoma of groin     Groin hematoma, initial encounter     Pulmonary nodule     Chest pressure     NSTEMI (non-ST elevated myocardial infarction) (H)     (HFpEF) heart failure with preserved ejection fraction (H)     Generalized weakness     Leg fracture, left     CKD (chronic kidney disease) stage 4, GFR 15-29 ml/min (H)     S/p left hip fracture 11/20/18 -- persistent drainage     Anemia, iron deficiency     Peripheral vascular disease (H)     Chronic respiratory failure, unspecified whether with hypoxia or hypercapnia (H)     Past Surgical History:   Procedure Laterality Date     ARTHROPLASTY HIP Left 10/20/2018    Procedure: LEFT HIP HERACLIO-ARTHROPLASTY ;  Surgeon: Ish Fish MD;  Location:  OR     ARTHROSCOPY SHOULDER RT/LT  1999, 2004    Bilateral, right then left     BONE MARROW BIOPSY, BONE SPECIMEN, NEEDLE/TROCAR N/A 8/29/2017    Procedure: BIOPSY BONE MARROW;  BONE MARROW BIOPSY;  Surgeon: Marino Cohen MD;  Location:  GI     C DEXA, BONE DENSITY, AXIAL SKEL       C MAMMOGRAM, SCREENING  1/2009     COLONOSCOPY N/A 10/7/2016    Procedure: COMBINED COLONOSCOPY, SINGLE OR MULTIPLE BIOPSY/POLYPECTOMY BY BIOPSY;  Surgeon: Cassandra Mccord MD;  Location:  GI     COLONOSCOPY N/A 2/15/2019    Procedure: COLONOSCOPY;  Surgeon: Juan Pablo Hayden DO;  Location:  GI     ESOPHAGOSCOPY, GASTROSCOPY, DUODENOSCOPY (EGD), COMBINED N/A 8/10/2017    Procedure: COMBINED ESOPHAGOSCOPY, GASTROSCOPY, DUODENOSCOPY (EGD), BIOPSY SINGLE OR MULTIPLE;   gastroscopy;  Surgeon: Helene Bustamante MD;  Location:  GI     ESOPHAGOSCOPY, GASTROSCOPY, DUODENOSCOPY (EGD), COMBINED N/A 2/15/2019    Procedure: COMBINED ESOPHAGOSCOPY, GASTROSCOPY, DUODENOSCOPY (EGD);  Surgeon: Juan Pablo Hayden DO;  Location:  GI     H ABLATION AV NODE  10/2012     HC COLONOSCOPY THRU STOMA, DIAGNOSTIC  ? 2005     IMPLANT PACEMAKER  10/2012    [id number], [id number]     IRRIGATION AND DEBRIDEMENT LOWER EXTREMITY, COMBINED Left 2018    Procedure: INCISION AND DRAINAGE OF LEFT HIP WITH EXCISION OF BONE FRAGMENT;  Surgeon: Ish Fish MD;  Location:  OR     JOINT REPLACEMTN, KNEE RT/LT      Joint Replacement knee bilateral     LAPAROSCOPIC CHOLECYSTECTOMY WITH CHOLANGIOGRAMS N/A 2015    Procedure: LAPAROSCOPIC CHOLECYSTECTOMY WITH CHOLANGIOGRAMS;  Surgeon: Ervin Amos MD;  Location:  OR     LUMPECTOMY BREAST      Left-     PHACOEMULSIFICATION CLEAR CORNEA WITH STANDARD INTRAOCULAR LENS IMPLANT Left 2015    Procedure: PHACOEMULSIFICATION CLEAR CORNEA WITH STANDARD INTRAOCULAR LENS IMPLANT;  Surgeon: Sai Obregon MD;  Location: [facility]     PHACOEMULSIFICATION CLEAR CORNEA WITH TORIC INTRAOCULAR LENS IMPLANT Right 2017    Procedure: PHACOEMULSIFICATION CLEAR CORNEA WITH TORIC INTRAOCULAR LENS IMPLANT;  RIGHT EYE PHACOEMULSIFICATION CLEAR CORNEA WITH TORIC INTRAOCULAR LENS IMPLANT ;  Surgeon: Duc Richmond MD;  Location:  EC       Social History     Tobacco Use     Smoking status: Former Smoker     Packs/day: 0.25     Years: 45.00     Pack years: 11.25     Types: Cigarettes     Last attempt to quit: 2018     Years since quittin.7     Smokeless tobacco: Never Used   Substance Use Topics     Alcohol use: No     Alcohol/week: 0.0 oz     Family History   Problem Relation Age of Onset     Hypertension Mother      Diabetes Mother          at 83 yrs     C.A.D. Father          age 70s     Breast Cancer No family hx of      Colon  Cancer No family hx of          Current Outpatient Medications   Medication Sig Dispense Refill     ACCU-CHEK SMARTVIEW test strip USE 1 STRIP BY IN VITRO ROUTE 2 TIMES DAILY OR AS DIRECTED 200 strip 0     ACE/ARB/ARNI NOT PRESCRIBED (INTENTIONAL) Please choose reason not prescribed, below       albuterol (PROAIR HFA, PROVENTIL HFA, VENTOLIN HFA) 108 (90 BASE) MCG/ACT inhaler Inhale 2 puffs into the lungs every 4 hours as needed for shortness of breath / dyspnea or wheezing 1 Inhaler 5     apixaban ANTICOAGULANT (ELIQUIS) 2.5 MG tablet Take 1 tablet (2.5 mg) by mouth 2 times daily 60 tablet 0     ASPIRIN NOT PRESCRIBED (INTENTIONAL) Please choose reason not prescribed, below       blood glucose monitoring (NO BRAND SPECIFIED) meter device kit [employer] Mariza meter; verified with pharmacist 1 kit 0     denosumab (PROLIA) 60 MG/ML SOLN injection Inject 1 mL (60 mg) Subcutaneous every 6 months 1 mL 1     glipiZIDE (GLUCOTROL) 5 MG tablet Take 1 tablet (5 mg) by mouth every morning (before breakfast) 90 tablet 0     hydrocortisone (CORTAID) 1 % external cream Apply topically 3 times daily as needed for itching       hydrOXYzine (VISTARIL) 25 MG capsule Take 1 capsule (25 mg) by mouth 2 times daily as needed for itching 180 capsule 1     hypromellose (ARTIFICIAL TEARS) 0.5 % SOLN ophthalmic solution Place 1 drop into both eyes every hour as needed for dry eyes       lidocaine (LIDODERM) 5 % patch        metoprolol tartrate (LOPRESSOR) 25 MG tablet Take 1 tablet (25 mg) by mouth 2 times daily 180 tablet 3     ondansetron (ZOFRAN ODT) 4 MG ODT tab Take 1 tablet (4 mg) by mouth every 6 hours as needed for nausea 30 tablet 1     pregabalin (LYRICA) 75 MG capsule Take 1 capsule (75 mg) by mouth 2 times daily 60 capsule 3     ranitidine (ZANTAC) 150 MG tablet Take 1 tablet (150 mg) by mouth At Bedtime 90 tablet 3     simvastatin (ZOCOR) 10 MG tablet Take 1 tablet (10 mg) by mouth At Bedtime 90 tablet 3     spironolactone  "(ALDACTONE) 25 MG tablet Take 1 tablet (25 mg) by mouth daily 30 tablet 1     tiotropium (SPIRIVA) 18 MCG inhaled capsule Inhale 18 mcg into the lungs daily       torsemide (DEMADEX) 20 MG tablet TAKE 1 TABLET BY MOUTH EVERY DAY 90 tablet 1     Allergies   Allergen Reactions     Ambien [Zolpidem Tartrate] Visual Disturbance     Hallucinations and fell out of bed at night.     Penicillins Hives     Definity      Caused a syncopal episode.     Sulfa Drugs Itching     Cymbalta Rash     Fluconazole Rash     Tolerates miconazole suppositories       Reviewed and updated as needed this visit by clinical staff  Tobacco  Allergies  Meds       Reviewed and updated as needed this visit by Provider         ROS:  Constitutional, HEENT, cardiovascular, pulmonary, gi and gu systems are negative, except as otherwise noted.    OBJECTIVE:     /76 (BP Location: Right arm, Cuff Size: Adult Regular)   Pulse 70   Ht 1.651 m (5' 5\")   Wt 73.9 kg (163 lb)   LMP  (LMP Unknown)   SpO2 93%   BMI 27.12 kg/m    Body mass index is 27.12 kg/m . stable VS  GENERAL:  alert and no distress, color pink, speaking full sentences  EYES: Eyes grossly normal to inspection, PERRL and conjunctivae and sclerae normal  NECK: no adenopathy, no asymmetry, masses, or scars and thyroid normal to palpation  RESP: lungs: LLL rales half way  CV: regular rate and rhythm, normal S1 S2, no S3 or S4, systolic  murmur, click or rub, woody  peripheral edema  ABDOMEN: soft, nontender, no hepatosplenomegaly, no masses and bowel sounds normal  MS: no gross musculoskeletal defects noted, bilateral LE edema 3+  SKIN: venous stasis rash bilaterally , no serous bullae yet  NEURO: Normal strength and tone, mentation intact and speech normal  PSYCH: mentation appears normal, affect normal    Diagnostic Test Results:  none     ASSESSMENT/PLAN:         ICD-10-CM    1. Shortness of breath R06.02 XR Chest 2 Views   2. Pulmonary hypertension (H) I27.20    3. Severe " anemia D64.9    4. Congestive heart failure, unspecified HF chronicity, unspecified heart failure type (H) I50.9 Comprehensive metabolic panel (BMP + Alb, Alk Phos, ALT, AST, Total. Bili, TP)     spironolactone (ALDACTONE) 25 MG tablet     Basic metabolic panel  (Ca, Cl, CO2, Creat, Gluc, K, Na, BUN)   5. Hypokalemia E87.6 Comprehensive metabolic panel (BMP + Alb, Alk Phos, ALT, AST, Total. Bili, TP)     spironolactone (ALDACTONE) 25 MG tablet     Basic metabolic panel  (Ca, Cl, CO2, Creat, Gluc, K, Na, BUN)   6. Peripheral edema R60.9    start spironolactone 25mg daily  Blood work and follow up appt with Dr Esparza in one week     I have tried to encourage her to go to the lymphedema clinic but she refuses.  States she has some hose at home to wear for edema  Advised to wear them during the day and to keep legs elevated whenever possible    ALLEN James [facility]

## 2019-03-04 NOTE — PATIENT INSTRUCTIONS
I have added a new medication to your regimen: spironolactone 25mg once a day in the morning  In one week follow up with Dr Esparza  And prior to that appointment have your bloodwork done to check your potassium and kidney function

## 2019-03-06 NOTE — TELEPHONE ENCOUNTER
Reason for Call:  Other call back    Detailed comments: in home care pt needs verbal orders for:      PT 1x week for 6 weeks   Ongoing strengthen, gape and balance training, activity tolerance, and oxygen monitoring     Phone Number Patient can be reached at: Cell number on file:    Telephone Information:   Mobile [phone]       Best Time: any    Can we leave a detailed message on this number? YES    Call taken on 3/6/2019 at 2:05 PM by Becky Levy

## 2019-03-06 NOTE — TELEPHONE ENCOUNTER
"Fax from [facility] #[phone] asking for 90 day supply refill of Spiriva.     Historical in chart.  Medication was stopped for \"med rec clean up\" and made historical on 2/13/19 by pharmacist at [facility].    Pended medication, however it appears it may not be formulary on patient's insurance.    Pending Prescriptions:                       Disp   Refills    tiotropium (SPIRIVA) 18 MCG inhaled capsu*90 cap*1            Sig: Inhale 1 capsule (18 mcg) into the lungs daily          Last Written Prescription Date:  historical  Last Fill Quantity: 30,   # refills: -  Last Office Visit: 3-1-19 Agu[place], 2-25-19 Vilma  Future Office visit:    Next 5 appointments (look out 90 days)    Mar 12, 2019  1:30 PM CDT  [facility] [address] [facility] [id number] [phone]           Routing refill request to provider for review/approval because:  Medication is reported/historical    Candida Mason RT (R)    "

## 2019-03-12 NOTE — LETTER
My COPD Action Plan     Name: Helene Gibbs    YOB: 1935   Date: 3/12/2019    My doctor: Neisha Esparza MD   My clinic: [address] [id number] [phone]  My Controller Medicine: spiriva   Dose:      My Rescue Medicine: albuterol   Dose:           My COPD Severity:      Use of Oxygen: 2 Liters as needed     Make sure you've had your pneumonia   vaccines.          GREEN ZONE       Doing well today      Usual level of activity and exercise    Usual amount of cough and mucus    No shortness of breath    Usual level of health (thinking clearly, sleeping well, feel like eating) Actions:      Take daily medicines    Use oxygen as prescribed    Follow regular exercise and diet plan    Avoid cigarette smoke and other irritants that harm the lungs           YELLOW ZONE          Having a bad day or flare up      Short of breath more than usual    A lot more sputum (mucus) than usual    Sputum looks yellow, green, tan, brown or bloody    More coughing or wheezing    Fever or chills    Less energy; trouble completing activities    Trouble thinking or focusing    Using quick relief inhaler or nebulizer more often    Poor sleep; symptoms wake me up    Do not feel like eating Actions:      Get plenty of rest    Take daily medicines    Use quick relief inhaler every 4 hours    If you use oxygen, call you doctor to see if you should adjust your oxygen    Do breathing exercises or other things to help you relax    Let a loved one, friend or neighbor know you are feeling worse    Call your care team if you have 2 or more symptoms.  Start taking steroids or antibiotics if directed by your care team           RED ZONE       Need medical care now      Severe shortness of breath (feel you can't breathe)    Fever, chills    Not enough breath to do any activity    Trouble coughing up mucus, walking or talking    Blood in mucus    Frequent coughing   Rescue medicines are  not working    Not able to sleep because of breathing    Feel confused or drowsy    Chest pain    Actions:      Call your health care team.  If you cannot reach your care team, call 911 or go to the emergency room.        Annual Reminders:  Meet with Care Team, Flu Shot every Fall  Pharmacy:    [facility] - [place], [place] - [address] [address]  [id number] [place] [place] - [address]  [facility][id number] - [place], [place] - [address] [facility] - [place], [place] - [address]  [facility] - [place], [place] - [address]

## 2019-03-12 NOTE — PATIENT INSTRUCTIONS
I refilled your prescriptions  Take course of antibiotics  Take spironolactone 1-2  in the morning  Hold if dehydrated or dizzy  Follow up in one month  Seek sooner medical attention if there is any worsening of symptoms or problems

## 2019-03-12 NOTE — LETTER
My Heart Failure Action Plan   Name: Helene Gibbs    YOB: 1935   Date: 3/12/2019    [place] [address] [facility] [id number] [phone]  My Diagnosis: Right Heart Failure   My Ejection Fraction: Over 50%    My Exercise Goal: 30 minutes daily  .     My Weight Goal:   Wt Readings from Last 2 Encounters:   03/12/19 68.8 kg (151 lb 9.6 oz)   03/01/19 73.9 kg (163 lb)     Weigh yourself daily using the same scale. If you gain more than 2 pounds in 24 hours or 5 pounds in a week call the clinic    My Diet Goal: No added salt    Emergency Room Visits:    Our goal is to improve your quality of life and help you avoid a visit to the emergency room or hospital.  If we work together, we can achieve this goal. But, if you feel you need to call 911 or go to the emergency room, please do so.  If you go to the emergency room, please bring your list of medicines and your daily weight chart with you.       GREEN ZONE     Doing well today    Weight gained is no more than 2 pounds a day or 5 pounds a week.    No swelling in feet, ankles, legs or stomach.    No more swelling than usual.    No more trouble breathing than usual.    No change in my sleep.    No other problems. Actions:    I am doing fine.  I will take my medicine, follow my diet, see my doctor, exercise, and watch for symptoms.           YELLOW ZONE         Having a bad day or flare up    Weight gain of more than 2 pounds in one day or 5 pounds in one week.    New swelling in ankle, leg, knee or thigh.    Bloating in belly, pants feel tighter.    Swelling in hands or face.    Coughing or trouble breathing while walking or talking.    Harder to breathe last night.    Have trouble sleeping, wake up short of breath.    Much more tired than usual.    Not eating.    Pain in my chest or bad leg cramps.    Feel weak or dizzy. Actions:    I need to take action and call my doctor or nurse  today.                 RED ZONE         Need medical care now    Weight gain of 5 pounds overnight.    Chest pain or pressure that does not go away.    Feel less alert.    Wheezing or have trouble breathing when at rest.    Cannot sleep lying down.    Cannot take my water pill.    Pass out or faint. Actions:    I need to call my doctor or nurse now!    Call 911 if I have chest pain or cannot breathe.

## 2019-03-12 NOTE — PROGRESS NOTES
"  SUBJECTIVE:   Helene Gibbs is a 83 year old female who presents to clinic today for the following health issues:      Follow up on shortness of breath  Still having chest congestion and not getting any better    Reports she has \"chunks\" of sputum coming out  It's not always clear in color  However, it's not very dark colored either  Denies respiratory symptoms otherwise    Problem list and histories reviewed & adjusted, as indicated.  Additional history: as documented    Medications and labs reviewed in EPIC    Reviewed and updated as needed this visit by clinical staff  Tobacco  Allergies  Meds       Reviewed and updated as needed this visit by Provider       ROS:  Constitutional, HEENT, cardiovascular, pulmonary, GI, , musculoskeletal, neuro, skin, endocrine and psych systems are negative, except as otherwise noted.    POSITIVE for SOB, congestion    This document serves as a record of the services and decisions personally performed and made by Neisha Esparza MD. It was created on her behalf by Cassandra Holcomb, a trained medical scribe. The creation of this document is based on the provider's statements to the medical scribe.  Cassandra Holcomb 2:05 PM March 12, 2019    OBJECTIVE:     /72 (BP Location: Right arm, Patient Position: Sitting, Cuff Size: Adult Regular)   Pulse 70   Temp 97.3  F (36.3  C) (Oral)   Ht 1.651 m (5' 5\")   Wt 68.8 kg (151 lb 9.6 oz)   LMP  (LMP Unknown)   SpO2 91%   BMI 25.23 kg/m    Body mass index is 25.23 kg/m .    GENERAL APPEARANCE: ambulated in wheelchair, alert and no distress  EYES: Eyes grossly normal to inspection, PERRL and conjunctivae and sclerae normal  HENT: ear canals and TM's normal and nose and mouth without ulcers or lesions  NECK: no adenopathy  RESP: lungs clear to auscultation - no rales, rhonchi or wheezes  CV: regular rates and rhythm, normal S1 S2, no S3  PSYCH: mentation appears normal, affect normal/bright  Diabetic foot exam: deferred as " patient saw podiatrist    Diagnostic Test Results:  No results found. However, due to the size of the patient record, not all encounters were searched. Please check Results Review for a complete set of results.    Reviewed and discussed CXR done on 03/01/19  Reviewed and discussed labs done on 02/25/19  Reviewed and discussed echo done on 02/17/19  ASSESSMENT/PLAN:     Helene was seen today for recheck.    Diagnoses and all orders for this visit:    She is complex patient with multiple comorbidities.    Tricuspid valve insufficiency, unspecified etiology  Patient follows up with cardiology  Reports she's been feeling congested, SOB  She also reports excessive sputum  Sometimes it's colored  Explained to patient that she has CHF  Educated her that this will make her feel congested  However, also explained that she could be having symptoms of congestion with bronchitis  Prescribed antibiotics  Advised extra rest  Reviewed cardiac workup done in the past  I spent extensive amount of time explaining patient's condition  I provided counseling  to patient  Advised patient to get watchman device so she can go off of anticoagulatin  Serious illness conversation discussed in office visit today  Educated her about severity of her illness  Explained that heart failure will likely worsen  Educated her that hospice care may be an option for her in  Future     Shortness of breath  -     BNP-N terminal pro  See above    Pulmonary hypertension (H)  See above    Chronic right-sided heart failure (H)  -     HEART FAILURE ACTION PLAN  See above    Bronchitis  -     doxycycline monohydrate (MONODOX) 100 MG capsule; Take 1 capsule (100 mg) by mouth 2 times daily for 7 days  See above    Chronic obstructive pulmonary disease, unspecified COPD type (H)  -     COPD ACTION PLAN  See above  Patient is compliant with her medication    Hypokalemia  -     spironolactone (ALDACTONE) 25 MG tablet; Take 1-2 tablets (25-50 mg) by mouth  daily  Advised patient to take 1-2 tablets of spironolactone in the morning for hypokalemia  Advised holding if she's dehydrated or sick    Type 2 diabetes mellitus with diabetic nephropathy, without long-term current use of insulin (H)  -     FOOT EXAM  NO CHARGE [83514.114]  Doing well  Compliant with medication  Foot exam was normal  Lab Results   Component Value Date    A1C 5.5 10/19/2018    A1C 6.9 07/21/2018    A1C 6.8 03/22/2018    A1C 6.6 01/04/2018    A1C 6.7 08/10/2017     Anemia, unspecified type  -     CBC with platelets    Medication management  -     Basic metabolic panel    Osteoporosis  Patient got Prolia injection in office visit today  Advised adequate calcium and Vitamin D supplements     Other orders  -     Cancel: Albumin Random Urine Quantitative with Creat Ratio    Patient had questions about Metanx (L-methylfolate Ca)  It was recommended to patient by her podiatrist  Wanted to know if it's okay for her to take  Explained to her that it should be okay    The total visit time was 40 minutes more  than 50% was spent in counseling and coordination of care as discussed above.    Patient Instructions   I refilled your prescriptions  Take course of antibiotics  Take spironolactone 1-2  in the morning  Hold if dehydrated or dizzy  Follow up in one month  Seek sooner medical attention if there is any worsening of symptoms or problems    The information in this document, created by the medical scribe for me, accurately reflects the services I personally performed and the decisions made by me. I have reviewed and approved this document for accuracy prior to leaving the patient care area.  March 12, 2019 2:41 PM    Neisha Esparza MD  [facility]

## 2019-03-13 NOTE — TELEPHONE ENCOUNTER
HC nurse called and would like Oxygen, BP, and pulse Parameters   Last orders I see is 2L continuously  Per documentation, patient was 86% on RA, 92% on 1L. Please advise.  Per HC nurse, pt reports difficulty with O2 tank as she uses a walker and also pulls the oxygen  All other vitals are stable- she would just like parameters of when to notify PCP    Spoke with WILBERT Alan with [facility] to provide verbal orders for skilled nursing and HHA as requested    Dayanna: [phone]   OK to leave detailed VM    Thank you,  Vincent ASTORGA RN

## 2019-03-13 NOTE — RESULT ENCOUNTER NOTE
Shakira Narayan,    This is to inform you regarding your test result.    I spoke to you on phone but sending you a result note also.  Your hemoglobin has improved  It is 11.3  Kidney function is a stable  BNP which is a test to check for heart failure is elevated but numbers are improving  I have increased your spironolactone from 25 mg 1 tablet daily to 25 mg 1 to 2 tablet daily  Follow-up in 1 month as discussed during office visit    Sincerely,      Dr.Nasima Vilma MD,FACP

## 2019-03-13 NOTE — TELEPHONE ENCOUNTER
Oxygen 1-2 liters to keep oxygen saturation at 90 percent or higher.  Notify if BP stays consistently greater than 160/90  Or pulse below 40 or above  100.  Dr.Nasima Vilma MD

## 2019-03-13 NOTE — TELEPHONE ENCOUNTER
Huddled with PCP- she added parameters to notify if SBP is <90    Called Day[facility] and relayed message    Vincent ASTORGA RN

## 2019-03-18 NOTE — TELEPHONE ENCOUNTER
Rx for Hydroxyzine is PRN  Last written in 2017    Pending Prescriptions:                       Disp   Refills    hydrOXYzine (VISTARIL) 25 MG capsule      180 ca*1            Sig: Take 1 capsule (25 mg) by mouth 2 times daily as           needed for itching          Last Written Prescription Date:  4-  Last Fill Quantity: 180,   # refills: 1  Last Office Visit: 3-12-19 Vilma  Future Office visit:    Next 5 appointments (look out 90 days)    Apr 09, 2019  1:30 PM CDT  Office Visit with Neisha [facility]za MD  [facility] [address] [address]  [phone]           Routing refill request to provider for review/approval because:  Break in therapy - patient takes PRN    Candida Mason RT (R)

## 2019-03-20 NOTE — TELEPHONE ENCOUNTER
Reason for Call:  Home Health Care    Angelica with [facility] called regarding FYI    Orders are needed for this patient.     PT:    OT: OT eval done on 3.19.19 and will be an eval only and continue with PT for strengthening    Skilled Nursing:     Pt Provider:     Phone Number Homecare Nurse can be reached at: [phone]    Can we leave a detailed message on this number? YES    Best Time: any    Call taken on 3/20/2019 at 4:26 PM by Cecilia Baptiste

## 2019-03-22 NOTE — TELEPHONE ENCOUNTER
Fax from pharmacy requesting Flonase.  Medication not active in chart, but patient has had it for multiple Rx's in past    Pending Prescriptions:                       Disp   Refills    fluticasone (FLONASE) 50 MCG/ACT nasal sp*48 g   3            Sig: Spray 2 sprays into both nostrils daily          Last Written Prescription Date:  -  Last Fill Quantity: -,   # refills: -  Last Office Visit: 3-12-19 Vilma  Future Office visit:    Next 5 appointments (look out 90 days)    Apr 09, 2019  1:30 PM CDT  [facility]  [facility] [address] [id number] [phone]           Routing refill request to provider for review/approval because:  Drug not active on patient's medication list    Candida Mason RT (R)

## 2019-03-29 NOTE — TELEPHONE ENCOUNTER
Pending Prescriptions:                       Disp   Refills    LYRICA 75 MG capsule [Pharmacy Med Name: *60 cap*             Sig: TAKE 1 CAPSULE BY MOUTH TWICE A DAY    lyrica   Last Written Prescription Date:  Not on med list   Last Fill Quantity: ,  # refills:    Last office visit: 3/12/2019 with prescribing provider:     Future Office Visit:   Next 5 appointments (look out 90 days)    Apr 09, 2019  1:30 PM CDT  Office Visit with Neisha Esparza MD  [facility] [address] [address]  [phone]           Requested Prescriptions   Pending Prescriptions Disp Refills     LYRICA 75 MG capsule [Pharmacy Med Name: LYRICA 75 MG CAPSULE] 60 capsule      Sig: TAKE 1 CAPSULE BY MOUTH TWICE A DAY    There is no refill protocol information for this order

## 2019-03-29 NOTE — TELEPHONE ENCOUNTER
There were 3 additional refills sent to [facility] last month when we sent in refill.      I called [facility] and confirmed they have refills on file.     I phoned pt and told her they have refills.    She said they told her they didn't when she called them 1st.     pregabalin (LYRICA) 75 MG capsule 60 capsule 3 2/26/2019  No   Sig - Route: Take 1 capsule (75 mg) by mouth 2 times daily

## 2019-03-29 NOTE — TELEPHONE ENCOUNTER
Reason for Call:  Other prescription    Detailed comments: apixaban pregabalin (LYRICA) 75 MG capsule     [id number] [place] - [address]    Phone Number Patient can be reached at: Cell number on file:    Telephone Information:   Mobile [phone]       Best Time: any    Can we leave a detailed message on this number? YES    Call taken on 3/29/2019 at 12:37 PM by Becky Levy

## 2019-04-01 NOTE — TELEPHONE ENCOUNTER
Routing refill request to provider for review/approval because:  Drug not on the [facility] refill protocol   Patient should have refills remaining until June 2019    MARIOLA StephensN, RN  [employer] [facility]

## 2019-04-01 NOTE — TELEPHONE ENCOUNTER
Rx for Lyrica 75 mg Faxed to [id number] [facility] Unit Coordinator

## 2019-04-03 NOTE — PROGRESS NOTES
HPI and Plan: #[id number]  See dictation    No orders of the defined types were placed in this encounter.      No orders of the defined types were placed in this encounter.      There are no discontinued medications.      Encounter Diagnosis   Name Primary?     Permanent atrial fibrillation (H)        CURRENT MEDICATIONS:  Current Outpatient Medications   Medication Sig Dispense Refill     ACCU-CHEK SMARTVIEW test strip USE 1 STRIP BY IN VITRO ROUTE 2 TIMES DAILY OR AS DIRECTED 200 strip 0     ACE/ARB/ARNI NOT PRESCRIBED (INTENTIONAL) Please choose reason not prescribed, below       albuterol (PROAIR HFA, PROVENTIL HFA, VENTOLIN HFA) 108 (90 BASE) MCG/ACT inhaler Inhale 2 puffs into the lungs every 4 hours as needed for shortness of breath / dyspnea or wheezing 1 Inhaler 5     ASPIRIN NOT PRESCRIBED (INTENTIONAL) Please choose reason not prescribed, below       betamethasone valerate (VALISONE) 0.1 % external cream APPLY TOPICALLY 2 TIMES DAILY USE SPARINGLY 15 g 1     blood glucose monitoring (NO BRAND SPECIFIED) meter device kit Accucheck Mraiza meter; verified with pharmacist 1 kit 0     denosumab (PROLIA) 60 MG/ML SOLN injection Inject 1 mL (60 mg) Subcutaneous every 6 months 1 mL 1     fluticasone (FLONASE) 50 MCG/ACT nasal spray Spray 2 sprays into both nostrils daily 15.8 mL 11     glipiZIDE (GLUCOTROL) 5 MG tablet Take 1 tablet (5 mg) by mouth every morning (before breakfast) 90 tablet 1     hydrocortisone (CORTAID) 1 % external cream Apply topically 3 times daily as needed for itching       hydrOXYzine (VISTARIL) 25 MG capsule Take 1 capsule (25 mg) by mouth 2 times daily as needed for itching 180 capsule 1     hypromellose (ARTIFICIAL TEARS) 0.5 % SOLN ophthalmic solution Place 1 drop into both eyes every hour as needed for dry eyes       lidocaine (LIDODERM) 5 % patch        LYRICA 75 MG capsule TAKE 1 CAPSULE BY MOUTH TWICE A DAY 60 capsule 1     metoprolol tartrate (LOPRESSOR) 25 MG tablet Take 1 tablet (25  mg) by mouth 2 times daily 180 tablet 3     ondansetron (ZOFRAN ODT) 4 MG ODT tab Take 1 tablet (4 mg) by mouth every 6 hours as needed for nausea 30 tablet 1     PROCTOSOL HC 2.5 % cream PLACE RECTALLY 2 TIMES DAILY AS NEEDED FOR HEMORRHOIDS 28.35 g 1     ranitidine (ZANTAC) 150 MG tablet Take 1 tablet (150 mg) by mouth At Bedtime 90 tablet 3     simvastatin (ZOCOR) 10 MG tablet Take 1 tablet (10 mg) by mouth At Bedtime 90 tablet 3     spironolactone (ALDACTONE) 25 MG tablet Take 1-2 tablets (25-50 mg) by mouth daily 60 tablet 3     tiotropium (SPIRIVA) 18 MCG inhaled capsule Inhale 1 capsule (18 mcg) into the lungs daily 90 capsule 1     torsemide (DEMADEX) 20 MG tablet TAKE 1 TABLET BY MOUTH EVERY DAY 90 tablet 1       ALLERGIES     Allergies   Allergen Reactions     Ambien [Zolpidem Tartrate] Visual Disturbance     Hallucinations and fell out of bed at night.     Penicillins Hives     Definity      Caused a syncopal episode.     Sulfa Drugs Itching     Cymbalta Rash     Fluconazole Rash     Tolerates miconazole suppositories       PAST MEDICAL HISTORY:  Past Medical History:   Diagnosis Date     Anemia      Ankle arthritis      Arthritis      Back pain      Bell's palsy 9/08    left     Breast CA (H) 2004-    left lumpectomy, radiation, -recurrence     Breast cancer (H) 2004    Left breast lumpectomy w LN disection, and radiation therapy     CKD (chronic kidney disease)     stage 3 baseline Cr 1.3     Colon polyps     colonoscopy-9/12-next due in 9/13     Congestive heart failure (H)      COPD (chronic obstructive pulmonary disease) (H)      Coronary artery disease      DM (diabetes mellitus) (H)      GERD (gastroesophageal reflux disease)      Hyperlipidemia      Hypertension      Lumbar spinal stenosis     use cane     Nicotine dependence      Obesity      Osteoporosis      Other chronic pain      Pacemaker      Permanent atrial fibrillation (H) 8/2008    S/P AV node ablation and pacemaker 10-25-12        Pleural effusion      Pulmonary hypertension (H)      PVD (peripheral vascular disease) (H)      Rectal stenosis      Tobacco abuse     current     Tricuspid regurgitation        PAST SURGICAL HISTORY:  Past Surgical History:   Procedure Laterality Date     ARTHROPLASTY HIP Left 10/20/2018    Procedure: LEFT HIP HERACLIO-ARTHROPLASTY ;  Surgeon: Ish Fish MD;  Location:  OR     ARTHROSCOPY SHOULDER RT/LT  1999, 2004    Bilateral, right then left     BONE MARROW BIOPSY, BONE SPECIMEN, NEEDLE/TROCAR N/A 8/29/2017    Procedure: BIOPSY BONE MARROW;  BONE MARROW BIOPSY;  Surgeon: Marino Cohen MD;  Location:  GI     C DEXA, BONE DENSITY, AXIAL SKEL       C MAMMOGRAM, SCREENING  1/2009     COLONOSCOPY N/A 10/7/2016    Procedure: COMBINED COLONOSCOPY, SINGLE OR MULTIPLE BIOPSY/POLYPECTOMY BY BIOPSY;  Surgeon: Cassandra Mccord MD;  Location:  GI     COLONOSCOPY N/A 2/15/2019    Procedure: COLONOSCOPY;  Surgeon: Juan Pablo Hayden DO;  Location:  GI     ESOPHAGOSCOPY, GASTROSCOPY, DUODENOSCOPY (EGD), COMBINED N/A 8/10/2017    Procedure: COMBINED ESOPHAGOSCOPY, GASTROSCOPY, DUODENOSCOPY (EGD), BIOPSY SINGLE OR MULTIPLE;  gastroscopy;  Surgeon: Helene Bustamante MD;  Location:  GI     ESOPHAGOSCOPY, GASTROSCOPY, DUODENOSCOPY (EGD), COMBINED N/A 2/15/2019    Procedure: COMBINED ESOPHAGOSCOPY, GASTROSCOPY, DUODENOSCOPY (EGD);  Surgeon: Juan Pablo Hayden DO;  Location:  GI     H ABLATION AV NODE  10/2012     HC COLONOSCOPY THRU STOMA, DIAGNOSTIC  ? 2005     IMPLANT PACEMAKER  10/2012    [place] [id number], [id number]     IRRIGATION AND DEBRIDEMENT LOWER EXTREMITY, COMBINED Left 12/21/2018    Procedure: INCISION AND DRAINAGE OF LEFT HIP WITH EXCISION OF BONE FRAGMENT;  Surgeon: Ish Fish MD;  Location:  OR     JOINT REPLACEMTN, KNEE RT/LT      Joint Replacement knee bilateral     LAPAROSCOPIC CHOLECYSTECTOMY WITH CHOLANGIOGRAMS N/A 12/14/2015    Procedure: LAPAROSCOPIC CHOLECYSTECTOMY WITH  CHOLANGIOGRAMS;  Surgeon: Ervin Amos MD;  Location:  OR     LUMPECTOMY BREAST      Left-     PHACOEMULSIFICATION CLEAR CORNEA WITH STANDARD INTRAOCULAR LENS IMPLANT Left 2015    Procedure: PHACOEMULSIFICATION CLEAR CORNEA WITH STANDARD INTRAOCULAR LENS IMPLANT;  Surgeon: Sai Obregon MD;  Location: [facility]     PHACOEMULSIFICATION CLEAR CORNEA WITH TORIC INTRAOCULAR LENS IMPLANT Right 2017    Procedure: PHACOEMULSIFICATION CLEAR CORNEA WITH TORIC INTRAOCULAR LENS IMPLANT;  RIGHT EYE PHACOEMULSIFICATION CLEAR CORNEA WITH TORIC INTRAOCULAR LENS IMPLANT ;  Surgeon: Duc Richmond MD;  Location: [facility]       FAMILY HISTORY:  Family History   Problem Relation Age of Onset     Hypertension Mother      Diabetes Mother          at 83 yrs     C.A.D. Father          age 70s     Breast Cancer No family hx of      Colon Cancer No family hx of        SOCIAL HISTORY:  Social History     Socioeconomic History     Marital status:      Spouse name: Not on file     Number of children: 0     Years of education: Not on file     Highest education level: Not on file   Occupational History     Not on file   Social Needs     Financial resource strain: Not on file     Food insecurity:     Worry: Not on file     Inability: Not on file     Transportation needs:     Medical: Not on file     Non-medical: Not on file   Tobacco Use     Smoking status: Former Smoker     Packs/day: 0.25     Years: 45.00     Pack years: 11.25     Types: Cigarettes     Last attempt to quit: 2018     Years since quittin.8     Smokeless tobacco: Never Used   Substance and Sexual Activity     Alcohol use: No     Alcohol/week: 0.0 oz     Drug use: No     Sexual activity: Not Currently     Partners: Female   Lifestyle     Physical activity:     Days per week: Not on file     Minutes per session: Not on file     Stress: Not on file   Relationships     Social connections:     Talks on phone: Not on file     Gets  "together: Not on file     Attends Quaker service: Not on file     Active member of club or organization: Not on file     Attends meetings of clubs or organizations: Not on file     Relationship status: Not on file     Intimate partner violence:     Fear of current or ex partner: Not on file     Emotionally abused: Not on file     Physically abused: Not on file     Forced sexual activity: Not on file   Other Topics Concern     Parent/sibling w/ CABG, MI or angioplasty before 65F 55M? Not Asked      Service Not Asked     Blood Transfusions Not Asked     Caffeine Concern Yes     Comment: 2 cups/day     Occupational Exposure Not Asked     Hobby Hazards Not Asked     Sleep Concern Yes     Stress Concern Yes     Weight Concern Yes     Comment: 15+ lb weight loss since hospitalization      Special Diet Yes     Comment: bland diet r/t cholecystectomy, low salt      Back Care Not Asked     Exercise No     Bike Helmet Not Asked     Seat Belt Yes     Self-Exams Not Asked   Social History Narrative    ,no childrenPOA- niece-Enedelia silvestre.  Lived in [place] and [place] in [employer].  Desires DNR/DNI.  (last updated 12/19/2018)        Review of Systems:  Skin:          Eyes:         ENT:         Respiratory:          Cardiovascular:         Gastroenterology:        Genitourinary:         Musculoskeletal:         Neurologic:         Psychiatric:         Heme/Lymph/Imm:         Endocrine:           Physical Exam:  Vitals: /79   Pulse 70   Ht 1.651 m (5' 5\")   Wt 70.8 kg (156 lb)   LMP  (LMP Unknown)   BMI 25.96 kg/m      Constitutional:  cooperative, alert and oriented, well developed, well nourished, in no acute distress        Skin:  warm and dry to the touch, no apparent skin lesions or masses noted   pacemaker incision in the left infraclavicular area was well-healed      Head:  normocephalic, no masses or lesions        Eyes:  conjunctivae and lids unremarkable        Lymph:  "     ENT:  no pallor or cyanosis, dentition good        Neck:  JVP normal        Respiratory:  clear to auscultation         Cardiac: regular rhythm, normal S1/S2, no S3 or S4, apical impulse not displaced, no murmurs, gallops or rubs                not assessed this visit                                        GI:  abdomen soft        Extremities and Muscular Skeletal:  no edema kyphosis            Neurological:  no gross motor deficits;affect appropriate        Psych:  Alert and Oriented x 3;affect appropriate, oriented to time, person and place;oriented to time, person and place        CC  Keyshawn Gomez MD  [address]  [place] [id number]

## 2019-04-03 NOTE — LETTER
4/3/2019    Neisha Esparza MD  [address]  [facility] [address]    RE: Helene Gibbs       Dear Colleague,    I had the pleasure of seeing Helene Gibbs in the [facility].    HPI and Plan: #[id number]  See dictation    No orders of the defined types were placed in this encounter.      No orders of the defined types were placed in this encounter.      There are no discontinued medications.      Encounter Diagnosis   Name Primary?     Permanent atrial fibrillation (H)        CURRENT MEDICATIONS:  Current Outpatient Medications   Medication Sig Dispense Refill     ACCU-CHEK SMARTVIEW test strip USE 1 STRIP BY IN VITRO ROUTE 2 TIMES DAILY OR AS DIRECTED 200 strip 0     ACE/ARB/ARNI NOT PRESCRIBED (INTENTIONAL) Please choose reason not prescribed, below       albuterol (PROAIR HFA, PROVENTIL HFA, VENTOLIN HFA) 108 (90 BASE) MCG/ACT inhaler Inhale 2 puffs into the lungs every 4 hours as needed for shortness of breath / dyspnea or wheezing 1 Inhaler 5     ASPIRIN NOT PRESCRIBED (INTENTIONAL) Please choose reason not prescribed, below       betamethasone valerate (VALISONE) 0.1 % external cream APPLY TOPICALLY 2 TIMES DAILY USE SPARINGLY 15 g 1     blood glucose monitoring (NO BRAND SPECIFIED) meter device kit Accucheck Mariza meter; verified with pharmacist 1 kit 0     denosumab (PROLIA) 60 MG/ML SOLN injection Inject 1 mL (60 mg) Subcutaneous every 6 months 1 mL 1     fluticasone (FLONASE) 50 MCG/ACT nasal spray Spray 2 sprays into both nostrils daily 15.8 mL 11     glipiZIDE (GLUCOTROL) 5 MG tablet Take 1 tablet (5 mg) by mouth every morning (before breakfast) 90 tablet 1     hydrocortisone (CORTAID) 1 % external cream Apply topically 3 times daily as needed for itching       hydrOXYzine (VISTARIL) 25 MG capsule Take 1 capsule (25 mg) by mouth 2 times daily as needed for itching 180 capsule 1     hypromellose (ARTIFICIAL TEARS) 0.5 % SOLN ophthalmic solution Place  1 drop into both eyes every hour as needed for dry eyes       lidocaine (LIDODERM) 5 % patch        LYRICA 75 MG capsule TAKE 1 CAPSULE BY MOUTH TWICE A DAY 60 capsule 1     metoprolol tartrate (LOPRESSOR) 25 MG tablet Take 1 tablet (25 mg) by mouth 2 times daily 180 tablet 3     ondansetron (ZOFRAN ODT) 4 MG ODT tab Take 1 tablet (4 mg) by mouth every 6 hours as needed for nausea 30 tablet 1     PROCTOSOL HC 2.5 % cream PLACE RECTALLY 2 TIMES DAILY AS NEEDED FOR HEMORRHOIDS 28.35 g 1     ranitidine (ZANTAC) 150 MG tablet Take 1 tablet (150 mg) by mouth At Bedtime 90 tablet 3     simvastatin (ZOCOR) 10 MG tablet Take 1 tablet (10 mg) by mouth At Bedtime 90 tablet 3     spironolactone (ALDACTONE) 25 MG tablet Take 1-2 tablets (25-50 mg) by mouth daily 60 tablet 3     tiotropium (SPIRIVA) 18 MCG inhaled capsule Inhale 1 capsule (18 mcg) into the lungs daily 90 capsule 1     torsemide (DEMADEX) 20 MG tablet TAKE 1 TABLET BY MOUTH EVERY DAY 90 tablet 1       ALLERGIES     Allergies   Allergen Reactions     Ambien [Zolpidem Tartrate] Visual Disturbance     Hallucinations and fell out of bed at night.     Penicillins Hives     Definity      Caused a syncopal episode.     Sulfa Drugs Itching     Cymbalta Rash     Fluconazole Rash     Tolerates miconazole suppositories       PAST MEDICAL HISTORY:  Past Medical History:   Diagnosis Date     Anemia      Ankle arthritis      Arthritis      Back pain      Bell's palsy 9/08    left     Breast CA (H) 2004-    left lumpectomy, radiation, -recurrence     Breast cancer (H) 2004    Left breast lumpectomy w LN disection, and radiation therapy     CKD (chronic kidney disease)     stage 3 baseline Cr 1.3     Colon polyps     colonoscopy-9/12-next due in 9/13     Congestive heart failure (H)      COPD (chronic obstructive pulmonary disease) (H)      Coronary artery disease      DM (diabetes mellitus) (H)      GERD (gastroesophageal reflux disease)      Hyperlipidemia      Hypertension       Lumbar spinal stenosis     use cane     Nicotine dependence      Obesity      Osteoporosis      Other chronic pain      Pacemaker      Permanent atrial fibrillation (H) 8/2008    S/P AV node ablation and pacemaker 10-25-12       Pleural effusion      Pulmonary hypertension (H)      PVD (peripheral vascular disease) (H)      Rectal stenosis      Tobacco abuse     current     Tricuspid regurgitation        PAST SURGICAL HISTORY:  Past Surgical History:   Procedure Laterality Date     ARTHROPLASTY HIP Left 10/20/2018    Procedure: LEFT HIP HERACLIO-ARTHROPLASTY ;  Surgeon: Ish Fish MD;  Location:  OR     ARTHROSCOPY SHOULDER RT/LT  1999, 2004    Bilateral, right then left     BONE MARROW BIOPSY, BONE SPECIMEN, NEEDLE/TROCAR N/A 8/29/2017    Procedure: BIOPSY BONE MARROW;  BONE MARROW BIOPSY;  Surgeon: Marino Cohen MD;  Location:  GI     C DEXA, BONE DENSITY, AXIAL SKEL       C MAMMOGRAM, SCREENING  1/2009     COLONOSCOPY N/A 10/7/2016    Procedure: COMBINED COLONOSCOPY, SINGLE OR MULTIPLE BIOPSY/POLYPECTOMY BY BIOPSY;  Surgeon: Cassandra Mccord MD;  Location:  GI     COLONOSCOPY N/A 2/15/2019    Procedure: COLONOSCOPY;  Surgeon: Juan Pablo Hayden DO;  Location:  GI     ESOPHAGOSCOPY, GASTROSCOPY, DUODENOSCOPY (EGD), COMBINED N/A 8/10/2017    Procedure: COMBINED ESOPHAGOSCOPY, GASTROSCOPY, DUODENOSCOPY (EGD), BIOPSY SINGLE OR MULTIPLE;  gastroscopy;  Surgeon: Helene Bustamante MD;  Location:  GI     ESOPHAGOSCOPY, GASTROSCOPY, DUODENOSCOPY (EGD), COMBINED N/A 2/15/2019    Procedure: COMBINED ESOPHAGOSCOPY, GASTROSCOPY, DUODENOSCOPY (EGD);  Surgeon: Juan Pablo Hayden DO;  Location:  GI     H ABLATION AV NODE  10/2012      COLONOSCOPY THRU STOMA, DIAGNOSTIC  ? 2005     IMPLANT PACEMAKER  10/2012    [facility] [facility] [id number], [id number]     IRRIGATION AND DEBRIDEMENT LOWER EXTREMITY, COMBINED Left 12/21/2018    Procedure: INCISION AND DRAINAGE OF LEFT HIP WITH EXCISION OF BONE FRAGMENT;   Surgeon: Ish Fish MD;  Location:  OR     JOINT REPLACEMTN, KNEE RT/LT      Joint Replacement knee bilateral     LAPAROSCOPIC CHOLECYSTECTOMY WITH CHOLANGIOGRAMS N/A 2015    Procedure: LAPAROSCOPIC CHOLECYSTECTOMY WITH CHOLANGIOGRAMS;  Surgeon: Ervin Amos MD;  Location:  OR     LUMPECTOMY BREAST      Left-     PHACOEMULSIFICATION CLEAR CORNEA WITH STANDARD INTRAOCULAR LENS IMPLANT Left 2015    Procedure: PHACOEMULSIFICATION CLEAR CORNEA WITH STANDARD INTRAOCULAR LENS IMPLANT;  Surgeon: Sai Obregon MD;  Location:  EC     PHACOEMULSIFICATION CLEAR CORNEA WITH TORIC INTRAOCULAR LENS IMPLANT Right 2017    Procedure: PHACOEMULSIFICATION CLEAR CORNEA WITH TORIC INTRAOCULAR LENS IMPLANT;  RIGHT EYE PHACOEMULSIFICATION CLEAR CORNEA WITH TORIC INTRAOCULAR LENS IMPLANT ;  Surgeon: Duc Richmond MD;  Location: [facility]       FAMILY HISTORY:  Family History   Problem Relation Age of Onset     Hypertension Mother      Diabetes Mother          at 83 yrs     C.A.D. Father          age 70s     Breast Cancer No family hx of      Colon Cancer No family hx of        SOCIAL HISTORY:  Social History     Socioeconomic History     Marital status:      Spouse name: Not on file     Number of children: 0     Years of education: Not on file     Highest education level: Not on file   Occupational History     Not on file   Social Needs     Financial resource strain: Not on file     Food insecurity:     Worry: Not on file     Inability: Not on file     Transportation needs:     Medical: Not on file     Non-medical: Not on file   Tobacco Use     Smoking status: Former Smoker     Packs/day: 0.25     Years: 45.00     Pack years: 11.25     Types: Cigarettes     Last attempt to quit: 2018     Years since quittin.8     Smokeless tobacco: Never Used   Substance and Sexual Activity     Alcohol use: No     Alcohol/week: 0.0 oz     Drug use: No     Sexual activity: Not Currently      "Partners: Female   Lifestyle     Physical activity:     Days per week: Not on file     Minutes per session: Not on file     Stress: Not on file   Relationships     Social connections:     Talks on phone: Not on file     Gets together: Not on file     Attends Ep[employer] service: Not on file     Active member of club or organization: Not on file     Attends meetings of clubs or organizations: Not on file     Relationship status: Not on file     Intimate partner violence:     Fear of current or ex partner: Not on file     Emotionally abused: Not on file     Physically abused: Not on file     Forced sexual activity: Not on file   Other Topics Concern     Parent/sibling w/ CABG, MI or angioplasty before 65F 55M? Not Asked      Service Not Asked     Blood Transfusions Not Asked     Caffeine Concern Yes     Comment: 2 cups/day     Occupational Exposure Not Asked     Hobby Hazards Not Asked     Sleep Concern Yes     Stress Concern Yes     Weight Concern Yes     Comment: 15+ lb weight loss since hospitalization      Special Diet Yes     Comment: bland diet r/t cholecystectomy, low salt      Back Care Not Asked     Exercise No     Bike Helmet Not Asked     Seat Belt Yes     Self-Exams Not Asked   Social History Narrative    ,no childrenPOA- niece-Enedelia silvestre.  Lived in [place] and [place] in [employer].  Desires DNR/DNI.  (last updated 12/19/2018)        Review of Systems:  Skin:          Eyes:         ENT:         Respiratory:          Cardiovascular:         Gastroenterology:        Genitourinary:         Musculoskeletal:         Neurologic:         Psychiatric:         Heme/Lymph/Imm:         Endocrine:           Physical Exam:  Vitals: /79   Pulse 70   Ht 1.651 m (5' 5\")   Wt 70.8 kg (156 lb)   LMP  (LMP Unknown)   BMI 25.96 kg/m       Constitutional:  cooperative, alert and oriented, well developed, well nourished, in no acute distress        Skin:  warm and dry to the touch, " no apparent skin lesions or masses noted   pacemaker incision in the left infraclavicular area was well-healed      Head:  normocephalic, no masses or lesions        Eyes:  conjunctivae and lids unremarkable        Lymph:      ENT:  no pallor or cyanosis, dentition good        Neck:  JVP normal        Respiratory:  clear to auscultation         Cardiac: regular rhythm, normal S1/S2, no S3 or S4, apical impulse not displaced, no murmurs, gallops or rubs                not assessed this visit                                        GI:  abdomen soft        Extremities and Muscular Skeletal:  no edema kyphosis            Neurological:  no gross motor deficits;affect appropriate        Psych:  Alert and Oriented x 3;affect appropriate, oriented to time, person and place;oriented to time, person and place        CC  Keyshawn Gomez MD  [address]  [place] [address]                Thank you for allowing me to participate in the care of your patient.      Sincerely,     Angelica Grady, NP, APRN CNP     [facility]    cc:   Keyshawn Gomez MD  [address]  [place] [address]

## 2019-04-03 NOTE — PROGRESS NOTES
Service Date: 04/03/2019      HISTORY OF PRESENT ILLNESS:  Ms. Gibbs is a delightful 83-year-old woman well known to me here at the [facility].  She is an established patient of Dr. Juarez.  She was recently seen by Dr. Gomez in consultation for possible Watchman 02/25/2019.      Ms. Gibbs's past medical history includes:     1.  Permanent atrial fibrillation with AV alexandra ablation and pacemaker implantation in 2012, on apixaban 2.5 mg b.i.d.   2.  Chronic diastolic heart failure (HFpEF), moderate RV dysfunction.     3.  Non-STEMI in 05/2008 based on troponin bump of 0.7.  Nuclear stress test showed mild apical ischemia being treated medically.   4.  Hypertension.   5.  Mild pulmonary hypertension.   6.  History of breast cancer.   7.  Severe anemia with recent admission 02/13/2019.  Colonoscopy revealed angioectasias.      8.  Mitral regurgitation, moderate/severe.        At today's visit Helene is doing well.  She states that her strength has improved since her hospitalization.  Her last hemoglobin was several weeks ago at 11.3.  Currently, she denies chest pain, shortness of breath, lightheadedness or dizziness.  In addition to the anemia, she also suffered a fall on the toilet, 02/11/2019.  Watchman was recommended both by Dr. Esparza and Dr. Gomez.      All other review of systems and past medical history are noted below.      ASSESSMENT AND PLAN:   Ms. Gibbs is a delightful 83-year-old woman here today for followup.  She is here today with her niece and great niece.   1.  Chronic diastolic heart failure and RV dysfunction. Most recent admission was 02/2019 for anemia.  She appears to be euvolemic on exam.  Her weight is stable at 156 pounds.  She is on torsemide 20 mg daily.     2.  Persistent atrial fibrillation with AV alexandra ablation and CHADS-VASc score of 6, including a recent fall and high risk for falls.   Dr. Gomez met with the patient 02/25.  He reviewed the procedure with the patient.  I  reviewed the procedure with the patient again today in detail.  An informational packet was given to her as well.  We also watched the educational video about Watchman placement.  She understands this will be done under general anesthesia, will be an overnight hospital stay, barring any complications.  I am hopeful that the CT scan completed last year, could be assessed to see if the appendage is suitable for the Watchman implant.  The patient will be contacted by our Watchman coordinator once we are able to schedule and proceed.  Risks and benefits were reviewed.  They will be reiterated at the time of the procedure and consent will be signed upon admission.     3.  Anemia/angioectasias noted on colonoscopy.   The patient has been followed by GI and has had a colonoscopy and endoscopy.  She understands that there is high risk for her being on anticoagulation long-term.      If there are any questions or concerns, I have asked Patricia to please contact us.  We will arrange her Watchman implant in the near future.  She understands she will be on apixaban and baby aspirin for 45 days post-procedure.  She will then be switched from apixaban to clopidogrel and baby aspirin for 4.5 months.  After month #6, the patient will be on aspirin only.  Also, she understands that she will need to have a transesophageal echo and a repeat CT scan 45 days after the device is implanted to assure that it is well seated.      Thank you for including me in her care.         VANESA SALCIDO NP             D: 2019   T: 2019   MT: BLANQUITA      Name:     PATRICIA BOBO   MRN:      [id number]        Account:      [id number]   :      1935           Service Date: 2019      Document: [id number]

## 2019-04-03 NOTE — LETTER
4/3/2019      Neisha Esparza MD  [address]  [facility] [address]      RE: Helene Gibbs       Dear Colleague,    I had the pleasure of seeing Helene Gibbs in the [facility].    Service Date: 04/03/2019      HISTORY OF PRESENT ILLNESS:  Ms. Gibbs is a delightful 83-year-old woman well known to me here at the [facility].  She is an established patient of Dr. Juarez.  She was recently seen by Dr. Gomez in consultation for possible Watchman 02/25/2019.      Ms. Gibbs's past medical history includes:     1.  Permanent atrial fibrillation with AV alexandra ablation and pacemaker implantation in 2012, on apixaban 2.5 mg b.i.d.   2.  Chronic diastolic heart failure (HFpEF), moderate RV dysfunction.     3.  Non-STEMI in 05/2008 based on troponin bump of 0.7.  Nuclear stress test showed mild apical ischemia being treated medically.   4.  Hypertension.   5.  Mild pulmonary hypertension.   6.  History of breast cancer.   7.  Severe anemia with recent admission 02/13/2019.  Colonoscopy revealed angioectasias.      8.  Mitral regurgitation, moderate/severe.        At today's visit Helene is doing well.  She states that her strength has improved since her hospitalization.  Her last hemoglobin was several weeks ago at 11.3.  Currently, she denies chest pain, shortness of breath, lightheadedness or dizziness.  In addition to the anemia, she also suffered a fall on the toilet, 02/11/2019.  Watchman was recommended both by Dr. Esparza and Dr. Gomez.      All other review of systems and past medical history are noted below.      ASSESSMENT AND PLAN:   Ms. Gibbs is a delightful 83-year-old woman here today for followup.  She is here today with her niece and great niece.   1.  Chronic diastolic heart failure and RV dysfunction. Most recent admission was 02/2019 for anemia.  She appears to be euvolemic on exam.  Her weight is stable at 156 pounds.  She is on torsemide 20  mg daily.     2.  Persistent atrial fibrillation with AV alexandra ablation and CHADS-VASc score of 6, including a recent fall and high risk for falls.   Dr. Gomez met with the patient .  He reviewed the procedure with the patient.  I reviewed the procedure with the patient again today in detail.  An informational packet was given to her as well.  We also watched the educational video about Watchman placement.  She understands this will be done under general anesthesia, will be an overnight hospital stay, barring any complications.  I am hopeful that the CT scan completed last year, could be assessed to see if the appendage is suitable for the Watchman implant.  The patient will be contacted by our Watchman coordinator once we are able to schedule and proceed.  Risks and benefits were reviewed.  They will be reiterated at the time of the procedure and consent will be signed upon admission.     3.  Anemia/angioectasias noted on colonoscopy.   The patient has been followed by GI and has had a colonoscopy and endoscopy.  She understands that there is high risk for her being on anticoagulation long-term.      If there are any questions or concerns, I have asked Helene to please contact us.  We will arrange her Watchman implant in the near future.  She understands she will be on apixaban and baby aspirin for 45 days post-procedure.  She will then be switched from apixaban to clopidogrel and baby aspirin for 4.5 months.  After month #6, the patient will be on aspirin only.  Also, she understands that she will need to have a transesophageal echo and a repeat CT scan 45 days after the device is implanted to assure that it is well seated.      Thank you for including me in her care.         VANESA SALCIDO NP             D: 2019   T: 2019   MT: BLANQUITA      Name:     HELENE BOBO   MRN:      [id number]        Account:      [id number]   :      1935           Service Date: 2019      Document:  [id number]           Outpatient Encounter Medications as of 4/3/2019   Medication Sig Dispense Refill     ACCU-CHEK SMARTVIEW test strip USE 1 STRIP BY IN VITRO ROUTE 2 TIMES DAILY OR AS DIRECTED 200 strip 0     ACE/ARB/ARNI NOT PRESCRIBED (INTENTIONAL) Please choose reason not prescribed, below       albuterol (PROAIR HFA, PROVENTIL HFA, VENTOLIN HFA) 108 (90 BASE) MCG/ACT inhaler Inhale 2 puffs into the lungs every 4 hours as needed for shortness of breath / dyspnea or wheezing 1 Inhaler 5     [] apixaban ANTICOAGULANT (ELIQUIS) 2.5 MG tablet Take 1 tablet (2.5 mg) by mouth 2 times daily 60 tablet 0     ASPIRIN NOT PRESCRIBED (INTENTIONAL) Please choose reason not prescribed, below       betamethasone valerate (VALISONE) 0.1 % external cream APPLY TOPICALLY 2 TIMES DAILY USE SPARINGLY 15 g 1     blood glucose monitoring (NO BRAND SPECIFIED) meter device kit Accucheck Mariza meter; verified with pharmacist 1 kit 0     denosumab (PROLIA) 60 MG/ML SOLN injection Inject 1 mL (60 mg) Subcutaneous every 6 months 1 mL 1     [] doxycycline monohydrate (MONODOX) 100 MG capsule Take 1 capsule (100 mg) by mouth 2 times daily for 7 days 14 capsule 0     fluticasone (FLONASE) 50 MCG/ACT nasal spray Spray 2 sprays into both nostrils daily 15.8 mL 11     glipiZIDE (GLUCOTROL) 5 MG tablet Take 1 tablet (5 mg) by mouth every morning (before breakfast) 90 tablet 1     hydrocortisone (CORTAID) 1 % external cream Apply topically 3 times daily as needed for itching       hydrOXYzine (VISTARIL) 25 MG capsule Take 1 capsule (25 mg) by mouth 2 times daily as needed for itching 180 capsule 1     hypromellose (ARTIFICIAL TEARS) 0.5 % SOLN ophthalmic solution Place 1 drop into both eyes every hour as needed for dry eyes       lidocaine (LIDODERM) 5 % patch        LYRICA 75 MG capsule TAKE 1 CAPSULE BY MOUTH TWICE A DAY 60 capsule 1     metoprolol tartrate (LOPRESSOR) 25 MG tablet Take 1 tablet (25 mg) by mouth 2 times daily 180  tablet 3     ondansetron (ZOFRAN ODT) 4 MG ODT tab Take 1 tablet (4 mg) by mouth every 6 hours as needed for nausea 30 tablet 1     PROCTOSOL HC 2.5 % cream PLACE RECTALLY 2 TIMES DAILY AS NEEDED FOR HEMORRHOIDS 28.35 g 1     ranitidine (ZANTAC) 150 MG tablet Take 1 tablet (150 mg) by mouth At Bedtime 90 tablet 3     simvastatin (ZOCOR) 10 MG tablet Take 1 tablet (10 mg) by mouth At Bedtime 90 tablet 3     spironolactone (ALDACTONE) 25 MG tablet Take 1-2 tablets (25-50 mg) by mouth daily 60 tablet 3     tiotropium (SPIRIVA) 18 MCG inhaled capsule Inhale 1 capsule (18 mcg) into the lungs daily 90 capsule 1     torsemide (DEMADEX) 20 MG tablet TAKE 1 TABLET BY MOUTH EVERY DAY 90 tablet 1     No facility-administered encounter medications on file as of 4/3/2019.        Again, thank you for allowing me to participate in the care of your patient.      Sincerely,    Angelica Grady NP, APRN CNP     [facility]

## 2019-04-03 NOTE — PATIENT INSTRUCTIONS
Call my nurse Linda for any questions or concerns:  [phone]  *If you have concerns after hours, please call [phone], option 2 to speak with on call Cardiologist.    1. Medication changes from today:  None

## 2019-04-05 NOTE — TELEPHONE ENCOUNTER
Spoke to pt who is wanting to have the Watchman procedure completed. Explained that at this time there is a chance to have completed on 4/16, but waiting on another pt.  If not this day the date of 5/7 was told to pt.  Will need to contact pt next week with dates.  Iman

## 2019-04-09 PROBLEM — J96.10 CHRONIC RESPIRATORY FAILURE, UNSPECIFIED WHETHER WITH HYPOXIA OR HYPERCAPNIA (H): Status: RESOLVED | Noted: 2019-01-01 | Resolved: 2019-01-01

## 2019-04-09 NOTE — PROGRESS NOTES
"  SUBJECTIVE:   Helene Gibbs is a 83 year old female who presents to clinic today for the following   health issues:    Patient uses walker    Hypokalemia   Patient is here for follow up on hypokalemia  Takes 1 tablet spironolactone  Wears compression socks  Watches salt intake    Anemia  Patient is here for follow up on hemoglobin  CBC on 03/12/2019 showed improvement to hemoglobin (11.3 g/dL)    Right sided heart failure  Takes 20 mg torsemide  Going to have watchman device in place  Follows with cardiology  Recently saw cardiology and was happy with patient's progress  Was recommended to continue medication regime  Reports she does not have congestion anymore    Diabetes  Takes 5 mg glipizide  Reports her sugars was low today  She checked it after eating and it was 85    Problem list and histories reviewed & adjusted, as indicated.  Additional history: as documented    Medications and Labs reviewed in EPIC    Reviewed and updated as needed this visit by clinical staff  Tobacco  Allergies  Meds       Reviewed and updated as needed this visit by Provider         ROS:  Constitutional, HEENT, cardiovascular, pulmonary, GI, , musculoskeletal, neuro, skin, endocrine and psych systems are negative, except as otherwise noted.    This document serves as a record of the services and decisions personally performed and made by Neisha Esparza MD. It was created on her behalf by Angelica Larson, a trained medical scribe. The creation of this document is based on the provider's statements to the medical scribe.  Angelica Larson 1:44 PM April 9, 2019  OBJECTIVE:   /70 (BP Location: Right arm, Patient Position: Sitting, Cuff Size: Adult Regular)   Pulse 72   Temp 97.5  F (36.4  C) (Oral)   Ht 1.651 m (5' 5\")   Wt 71.7 kg (158 lb)   LMP  (LMP Unknown)   SpO2 94%   BMI 26.29 kg/m    Body mass index is 26.29 kg/m .    GENERAL: healthy, alert and no distress  RESP: lungs clear to auscultation - no rales, rhonchi or wheezes  CV: " regular rate and rhythm, normal S1 S2, no S3 or S4, no murmur, click or rub, no peripheral edema and peripheral pulses strong  PSYCH: mentation appears normal, affect normal/bright    Diagnostic Test Results:  Results for orders placed or performed in visit on 04/09/19 (from the past 24 hour(s))   Hemoglobin A1c   Result Value Ref Range    Hemoglobin A1C 6.3 (H) 0 - 5.6 %     *Note: Due to a large number of results and/or encounters for the requested time period, some results have not been displayed. A complete set of results can be found in Results Review.     ASSESSMENT/PLAN:   Helene was seen today for recheck.    Diagnoses and all orders for this visit:    Chronic right-sided heart failure (H)  Doing well  Compliant with medication  Follows with Dr. Juarez, a cardiologist  Last saw cardiology on 04/03/2019 and was pleased with her current state  Was advised to continue medication  -     BNP-N terminal pro    Anemia, unspecified type  Last CBC showed hemoglobin improved  Repeat labs ordered today  -     CBC with platelets    Medication management  -     Basic metabolic panel  -     Drug Abuse Screen Panel 13, Urine (Pain Care Package)        CKD (chronic kidney disease) stage 4, GFR 15-29 ml/min (H)  Stable   Compliant with medication    Type 2 diabetes mellitus with diabetic nephropathy, without long-term current use of insulin (H)  Patient reports sugars were low after eating today (85)  Takes 5 mg glipizide daily  Labs ordered for evaluation  Advised she can skip some days if she feels her blood sugars are too low  -     Hemoglobin A1c  -     Albumin Random Urine Quantitative with Creat Ratio    Due for second shingles vaccine  Will check with pharmacy if stock available     Patient states that finally her condition improving   She is using car for transportation   Walks with help of walker and not using wheelchair  Patient Instructions   Labs today  If you start having more fluid retention you can take 2  tablets spironolactone  Follow up in one month.  Seek sooner medical attention if there is any worsening of symptoms or problems    The information in this document, created by the medical scribe for me, accurately reflects the services I personally performed and the decisions made by me. I have reviewed and approved this document for accuracy prior to leaving the patient care area.  April 9, 2019 2:03 PM    Neisha Esparza MD  [facility]

## 2019-04-09 NOTE — PATIENT INSTRUCTIONS
Labs today  If you start having more fluid retention you can take 2 tablets spironolactone  Follow up in one month.  Seek sooner medical attention if there is any worsening of symptoms or problems

## 2019-04-09 NOTE — PROGRESS NOTES
Prior to injection, verified patient identity using patient's name and date of birth.  Due to injection administration, patient instructed to remain in clinic for 15 minutes  afterwards, and to report any adverse reaction to me immediately.    Screening Questionnaire for Adult Immunization    Are you sick today?   No   Do you have allergies to medications, food, a vaccine component or latex?   Yes   Have you ever had a serious reaction after receiving a vaccination?   No   Do you have a long-term health problem with heart disease, lung disease, asthma, kidney disease, metabolic disease (e.g. diabetes), anemia, or other blood disorder?   Yes   Do you have cancer, leukemia, HIV/AIDS, or any other immune system problem?   No   In the past 3 months, have you taken medications that affect  your immune system, such as prednisone, other steroids, or anticancer drugs; drugs for the treatment of rheumatoid arthritis, Crohn s disease, or psoriasis; or have you had radiation treatments?   No   Have you had a seizure, or a brain or other nervous system problem?   No   During the past year, have you received a transfusion of blood or blood     products, or been given immune (gamma) globulin or antiviral drug?   Yes   For women: Are you pregnant or is there a chance you could become        pregnant during the next month?   No   Have you received any vaccinations in the past 4 weeks?   No     Immunization questionnaire was positive for at least one answer.  Notified Dr. Esparza.        Per orders of Dr. Esparza, injection of Shingrix (2nd dose) given by Nikos Mendoza. Patient instructed to remain in clinic for 15 minutes afterwards, and to report any adverse reaction to me immediately.       Screening performed by Nikos Mendoza on 4/9/2019 at 3:47 PM.

## 2019-04-11 NOTE — RESULT ENCOUNTER NOTE
Shakira Narayan,    This is to inform you regarding your test result.    I spoke to you on phone but sending you a result note also.  Urine is positive for microalbumin.  The numbers are improving.  Urine drug screen is satisfactory .  Chronic kidney disease is slightly worse.  Hemoglobin is 10.8 and slightly lower than before.  Recheck labs inone month.        Sincerely,      Dr.Nasima Vilma MD,FACP

## 2019-04-17 NOTE — TELEPHONE ENCOUNTER
Pt still wanting to proceed with WATCHMAN on 5/7 as discussed.  Pt will come in and see Angelica on 5/1 at 1230 for H & P and is aware that she will have lab work in the hospital after.  Will verify with Dr Partida that CT that was completed will work for procedure. Iman

## 2019-04-18 NOTE — TELEPHONE ENCOUNTER
Per Dr Partida another CT is needed as the MISAEL is not as well visualized as needed.  Will have completed the same day the pt see's Angelica.  Discussed with pt and will have her come in at noon and have hospital lab work completed, then pt will see Angelica as scheduled at 1230 and then pt will go up to have CT chest completed.  Pt states understanding. [email or web address]

## 2019-04-23 NOTE — TELEPHONE ENCOUNTER
"  [facility] on [address] should have refills on Ranitidine until 2/2020   ranitidine (ZANTAC) 150 MG tablet 90 tablet 3 2/26/2019  No   Sig - Route: Take 1 tablet (150 mg) by mouth At Bedtime           metoprolol tartrate (LOPRESSOR) 25 MG tablet 180 tablet 3 5/14/2018  No   Sig - Route: Take 1 tablet (25 mg) by mouth 2 times daily    .rxprot    Last Written Prescription Date:  05/14/2018  Last Fill Quantity: 180,  # refills: 3   Last office visit: 4/9/2019 with prescribing provider:     Future Office Visit:   Next 5 appointments (look out 90 days)    May 10, 2019 12:30 PM CDT  Office Visit with Neisha Esp[facility]za MD  [facility] [address] [address]  [phone]           Requested Prescriptions   Pending Prescriptions Disp Refills     ranitidine (ZANTAC) 150 MG tablet [Pharmacy Med Name: RANITIDINE 150 MG TABLET] 180 tablet 0     Sig: TAKE 1 TABLET BY MOUTH TWICE A DAY       H2 Blockers Protocol Passed - 4/23/2019  4:33 AM        Passed - Patient is age 12 or older        Passed - Recent (12 mo) or future (30 days) visit within the authorizing provider's specialty     Patient had office visit in the last 12 months or has a visit in the next 30 days with authorizing provider or within the authorizing provider's specialty.  See \"Patient Info\" tab in inbasket, or \"Choose Columns\" in Meds & Orders section of the refill encounter.              Passed - Medication is active on med list        metoprolol tartrate (LOPRESSOR) 25 MG tablet [Pharmacy Med Name: METOPROLOL TARTRATE 25 MG TAB] 180 tablet 0     Sig: TAKE 1 TABLET BY MOUTH TWICE A DAY       Beta-Blockers Protocol Passed - 4/23/2019  4:33 AM        Passed - Blood pressure under 140/90 in past 12 months     BP Readings from Last 3 Encounters:   04/09/19 114/70   04/03/19 135/79   03/12/19 128/72                 Passed - Patient is age 6 or older        Passed - Recent (12 mo) or future (30 days) visit within the " "authorizing provider's specialty     Patient had office visit in the last 12 months or has a visit in the next 30 days with authorizing provider or within the authorizing provider's specialty.  See \"Patient Info\" tab in inbasket, or \"Choose Columns\" in Meds & Orders section of the refill encounter.              Passed - Medication is active on med list          "

## 2019-04-24 NOTE — TELEPHONE ENCOUNTER
Metoprolol Prescription approved per [facility] [place] [place] Refill Protocol.  Zantac Rx denied. Rx filled on 2/26/19. Pharmacy notified.     Vincent ASTORGA RN

## 2019-04-25 NOTE — TELEPHONE ENCOUNTER
Received call from CT ([facility]) reporting patients GFR is 31 and procedure is not recommended.  Updated Dr Partida and he wants patient to have JOAN instead.  Orders placed in epic. Scheduling will call patient to get this set up.  WILBERT Belle

## 2019-05-01 NOTE — PATIENT INSTRUCTIONS
Call my nurse with any questions or concerns:  [phone]  *If you have concerns after hours, please call [phone], option 2 to speak with on call Cardiologist.

## 2019-05-01 NOTE — LETTER
"5/1/2019    Neisha Esparza MD  [address]  [facility] [address]    RE: Helene Gibbs       Dear Colleague,    I had the pleasure of seeing Helene Gibbs in the [facility].    Helene Gibbs  MRN: [id number]  Gender Identity: Female    HPI:  Ms. Gibbs is a delightful 83-year-old woman well known to me here at the [facility].  She is an established patient of Dr. Juarez.  She was recently seen by Dr. Gomez in consultation for possible Watchman 02/25/2019.      Ms. Gibbs's past medical history includes:     1.  Permanent atrial fibrillation with AV alexandra ablation and pacemaker implantation in 2012, on apixaban 2.5 mg b.i.d.   2.  Chronic diastolic heart failure (HFpEF), moderate RV dysfunction.     3.  Non-STEMI in 05/2008 based on troponin bump of 0.7.  Nuclear stress test showed mild apical ischemia being treated medically.   4.  Hypertension.   5.  Mild pulmonary hypertension.   6.  History of breast cancer.   7.  Severe anemia with recent admission 02/13/2019.  Colonoscopy revealed angioectasias.      8.  Mitral regurgitation, moderate/severe.     9. CKD with creat baseline 1.4-1.6, and GFR 35    At today's visit Helene is doing well.  Currently, she denies chest pain, shortness of breath, lightheadedness or dizziness. Her weight is up 10 lbs from our last visit. She reports eating more \"sweets\" than usual. She suffers from anemia, and is high risk for falls. The last fall was 02/11/2019. Watchman was recommended both by Dr. Esparza and Dr. Gomez and is scheduled 5/7/19. She is scheduled for a JOAN on Monday, 5/6/19.     All other review of systems and past medical history are noted below.     Plan:   Ms. Gibbs is a delightful 83-year-old woman here today and is stable from a cardiac standpoint.      1.  Chronic diastolic heart failure and RV dysfunction  Most recent admission was 02/2019 for anemia.  She appears to be euvolemic on exam " despite her weight being elevated 169 lbs.  She is on torsemide 20 mg daily and spironolactone.      2.  Persistent atrial fibrillation with AV alexandra ablation  [id number] (gender, age, HTC, CHF)  HAS-BLED- 2 (age, bleeding)    The Watchman procedure was reviewed with the patient today and at our visit last month.  An informational packet was given to her in April which she reviewed.   She understands this will be done under general anesthesia, will be an overnight hospital stay, barring any complications. Risks and benefits were reviewed and include but are not limited to: bruising,bleeding, vascular injury, pericardial effusion with possibility of tamponade, MI,CVA, emergent surgery, and a rare incidence of death, Patient understands and is willing to proceed.      Eliquis will be held for 2 days prior to the procedure per .    3.  Anemia/angioectasias noted on colonoscopy.   The patient has been followed by GI and has had a colonoscopy and endoscopy.  She understands that there is high risk for her being on anticoagulation long-term which is why she wants to proceed with Watchman. Pt has been feeling weak. Repeat HGB will be assessed on Monday.    JOAN is scheduled to assess for MISAEL thrombus and measurement for her device. She will be NPO.  Risks and benefits were reviewed today. They include, but are not limited to: sore throat, dysphagia, esophageal injury, bradyarrhythmia's. Pt. Understands and is willing to proceed. She has an upper plate that she plans on leaving at home. She has had endoscopies in the past and has not had issues with advancing the probe. Additional blood work will be completed at that time. Consent will be signed with the procedural physician.      She understands she will be on apixaban and baby aspirin for 45 days post-procedure.  She will then be switched from apixaban to clopidogrel and baby aspirin for 4.5 months.  After month #6, the patient will be on aspirin only.  Also,  she understands that she will need to have a transesophageal echo and a repeat CT scan 45 days after the device is implanted to assure that it is well seated.      Thank you for including me in her care.     Angelica Grady NP       No orders of the defined types were placed in this encounter.      No orders of the defined types were placed in this encounter.      There are no discontinued medications.      No diagnosis found.    CURRENT MEDICATIONS:  Current Outpatient Medications   Medication Sig Dispense Refill     ACCU-CHEK SMARTVIEW test strip USE 1 STRIP BY IN VITRO ROUTE 2 TIMES DAILY OR AS DIRECTED 200 strip 0     ACE/ARB/ARNI NOT PRESCRIBED (INTENTIONAL) Please choose reason not prescribed, below       albuterol (PROAIR HFA, PROVENTIL HFA, VENTOLIN HFA) 108 (90 BASE) MCG/ACT inhaler Inhale 2 puffs into the lungs every 4 hours as needed for shortness of breath / dyspnea or wheezing 1 Inhaler 5     apixaban ANTICOAGULANT (ELIQUIS) 2.5 MG tablet Take 1 tablet (2.5 mg) by mouth 2 times daily 60 tablet 1     betamethasone valerate (VALISONE) 0.1 % external cream APPLY TOPICALLY 2 TIMES DAILY USE SPARINGLY 15 g 1     blood glucose monitoring (NO BRAND SPECIFIED) meter device kit Accucheck Mariza meter; verified with pharmacist 1 kit 0     denosumab (PROLIA) 60 MG/ML SOLN injection Inject 1 mL (60 mg) Subcutaneous every 6 months 1 mL 1     fluticasone (FLONASE) 50 MCG/ACT nasal spray Spray 2 sprays into both nostrils daily 15.8 mL 11     glipiZIDE (GLUCOTROL) 5 MG tablet Take 1 tablet (5 mg) by mouth every morning (before breakfast) 90 tablet 1     hydrocortisone (CORTAID) 1 % external cream Apply topically 3 times daily as needed for itching       hydrOXYzine (VISTARIL) 25 MG capsule Take 1 capsule (25 mg) by mouth 2 times daily as needed for itching 180 capsule 1     hypromellose (ARTIFICIAL TEARS) 0.5 % SOLN ophthalmic solution Place 1 drop into both eyes every hour as needed for dry eyes       lidocaine  (LIDODERM) 5 % patch        LYRICA 75 MG capsule TAKE 1 CAPSULE BY MOUTH TWICE A DAY 60 capsule 1     metoprolol tartrate (LOPRESSOR) 25 MG tablet TAKE 1 TABLET BY MOUTH TWICE A DAY 90 tablet 1     PROCTOSOL HC 2.5 % cream PLACE RECTALLY 2 TIMES DAILY AS NEEDED FOR HEMORRHOIDS 28.35 g 1     ranitidine (ZANTAC) 150 MG tablet Take 1 tablet (150 mg) by mouth At Bedtime 90 tablet 3     simvastatin (ZOCOR) 10 MG tablet Take 1 tablet (10 mg) by mouth At Bedtime 90 tablet 3     spironolactone (ALDACTONE) 25 MG tablet Take 1-2 tablets (25-50 mg) by mouth daily 60 tablet 3     tiotropium (SPIRIVA) 18 MCG inhaled capsule Inhale 1 capsule (18 mcg) into the lungs daily 90 capsule 1     torsemide (DEMADEX) 20 MG tablet TAKE 1 TABLET BY MOUTH EVERY DAY 90 tablet 1     ASPIRIN NOT PRESCRIBED (INTENTIONAL) Please choose reason not prescribed, below       ondansetron (ZOFRAN ODT) 4 MG ODT tab Take 1 tablet (4 mg) by mouth every 6 hours as needed for nausea (Patient not taking: Reported on 5/1/2019) 30 tablet 1       ALLERGIES     Allergies   Allergen Reactions     Ambien [Zolpidem Tartrate] Visual Disturbance     Hallucinations and fell out of bed at night.     Penicillins Hives     Definity      Caused a syncopal episode.     Sulfa Drugs Itching     Cymbalta Rash     Fluconazole Rash     Tolerates miconazole suppositories       PAST MEDICAL HISTORY:  Past Medical History:   Diagnosis Date     Anemia      Ankle arthritis      Arthritis      Back pain      Bell's palsy 9/08    left     Breast CA (H) 2004-    left lumpectomy, radiation, -recurrence     Breast cancer (H) 2004    Left breast lumpectomy w LN disection, and radiation therapy     CKD (chronic kidney disease)     stage 3 baseline Cr 1.3     Colon polyps     colonoscopy-9/12-next due in 9/13     Congestive heart failure (H)      COPD (chronic obstructive pulmonary disease) (H)      Coronary artery disease      DM (diabetes mellitus) (H)      GERD (gastroesophageal reflux  disease)      Hyperlipidemia      Hypertension      Lumbar spinal stenosis     use cane     Nicotine dependence      Obesity      Osteoporosis      Other chronic pain      Pacemaker      Permanent atrial fibrillation (H) 8/2008    S/P AV node ablation and pacemaker 10-25-12       Pleural effusion      Pulmonary hypertension (H)      PVD (peripheral vascular disease) (H)      Rectal stenosis      Tobacco abuse     current     Tricuspid regurgitation        PAST SURGICAL HISTORY:  Past Surgical History:   Procedure Laterality Date     ARTHROPLASTY HIP Left 10/20/2018    Procedure: LEFT HIP HERACLIO-ARTHROPLASTY ;  Surgeon: Ish Fish MD;  Location:  OR     ARTHROSCOPY SHOULDER RT/LT  1999, 2004    Bilateral, right then left     BONE MARROW BIOPSY, BONE SPECIMEN, NEEDLE/TROCAR N/A 8/29/2017    Procedure: BIOPSY BONE MARROW;  BONE MARROW BIOPSY;  Surgeon: Marino Cohen MD;  Location:  GI     C DEXA, BONE DENSITY, AXIAL SKEL       C MAMMOGRAM, SCREENING  1/2009     COLONOSCOPY N/A 10/7/2016    Procedure: COMBINED COLONOSCOPY, SINGLE OR MULTIPLE BIOPSY/POLYPECTOMY BY BIOPSY;  Surgeon: Cassandra Mccord MD;  Location:  GI     COLONOSCOPY N/A 2/15/2019    Procedure: COLONOSCOPY;  Surgeon: Juan Pablo Hayden DO;  Location:  GI     ESOPHAGOSCOPY, GASTROSCOPY, DUODENOSCOPY (EGD), COMBINED N/A 8/10/2017    Procedure: COMBINED ESOPHAGOSCOPY, GASTROSCOPY, DUODENOSCOPY (EGD), BIOPSY SINGLE OR MULTIPLE;  gastroscopy;  Surgeon: Helene Bustamante MD;  Location:  GI     ESOPHAGOSCOPY, GASTROSCOPY, DUODENOSCOPY (EGD), COMBINED N/A 2/15/2019    Procedure: COMBINED ESOPHAGOSCOPY, GASTROSCOPY, DUODENOSCOPY (EGD);  Surgeon: Juan Pablo Hayden DO;  Location:  GI     H ABLATION AV NODE  10/2012     HC COLONOSCOPY THRU STOMA, DIAGNOSTIC  ? 2005     IMPLANT PACEMAKER  10/2012    [place] [id number], [id number]     IRRIGATION AND DEBRIDEMENT LOWER EXTREMITY, COMBINED Left 12/21/2018    Procedure: INCISION AND DRAINAGE OF  LEFT HIP WITH EXCISION OF BONE FRAGMENT;  Surgeon: Ish Fish MD;  Location:  OR     JOINT REPLACEMTN, KNEE RT/LT      Joint Replacement knee bilateral     LAPAROSCOPIC CHOLECYSTECTOMY WITH CHOLANGIOGRAMS N/A 2015    Procedure: LAPAROSCOPIC CHOLECYSTECTOMY WITH CHOLANGIOGRAMS;  Surgeon: Ervin Amos MD;  Location:  OR     LUMPECTOMY BREAST      Left-     PHACOEMULSIFICATION CLEAR CORNEA WITH STANDARD INTRAOCULAR LENS IMPLANT Left 2015    Procedure: PHACOEMULSIFICATION CLEAR CORNEA WITH STANDARD INTRAOCULAR LENS IMPLANT;  Surgeon: Sai Obregon MD;  Location:  EC     PHACOEMULSIFICATION CLEAR CORNEA WITH TORIC INTRAOCULAR LENS IMPLANT Right 2017    Procedure: PHACOEMULSIFICATION CLEAR CORNEA WITH TORIC INTRAOCULAR LENS IMPLANT;  RIGHT EYE PHACOEMULSIFICATION CLEAR CORNEA WITH TORIC INTRAOCULAR LENS IMPLANT ;  Surgeon: Duc Richmond MD;  [facility]: [facility]       FAMILY HISTORY:  Family History   Problem Relation Age of Onset     Hypertension Mother      Diabetes Mother          at 83 yrs     C.A.D. Father          age 70s     Breast Cancer No family hx of      Colon Cancer No family hx of        SOCIAL HISTORY:  Social History     Socioeconomic History     Marital status:      Spouse name: None     Number of children: 0     Years of education: None     Highest education level: None   Occupational History     None   Social Needs     Financial resource strain: None     Food insecurity:     Worry: None     Inability: None     Transportation needs:     Medical: None     Non-medical: None   Tobacco Use     Smoking status: Former Smoker     Packs/day: 0.25     Years: 45.00     Pack years: 11.25     Types: Cigarettes     Last attempt to quit: 2018     Years since quittin.9     Smokeless tobacco: Never Used   Substance and Sexual Activity     Alcohol use: No     Alcohol/week: 0.0 oz     Drug use: No     Sexual activity: Not Currently     Partners: Female  "  Lifestyle     Physical activity:     Days per week: None     Minutes per session: None     Stress: None   Relationships     Social connections:     Talks on phone: None     Gets together: None     Attends Restoration service: None     Active member of club or organization: None     Attends meetings of clubs or organizations: None     Relationship status: None     Intimate partner violence:     Fear of current or ex partner: None     Emotionally abused: None     Physically abused: None     Forced sexual activity: None   Other Topics Concern     Parent/sibling w/ CABG, MI or angioplasty before 65F 55M? Not Asked      Service Not Asked     Blood Transfusions Not Asked     Caffeine Concern Yes     Comment: 2 cups/day     Occupational Exposure Not Asked     Hobby Hazards Not Asked     Sleep Concern Yes     Stress Concern Yes     Weight Concern Yes     Comment: 15+ lb weight loss since hospitalization      Special Diet Yes     Comment: bland diet r/t cholecystectomy, low salt      Back Care Not Asked     Exercise No     Bike Helmet Not Asked     Seat Belt Yes     Self-Exams Not Asked   Social History Narrative    ,no childrenPOA- niece-Enedelia silvestre.  Lived in [place] and [place] in [employer].  Desires DNR/DNI.  (last updated 12/19/2018)        Review of Systems:  Skin:          Eyes:         ENT:         Respiratory:          Cardiovascular:         Gastroenterology:        Genitourinary:         Musculoskeletal:         Neurologic:         Psychiatric:         Heme/Lymph/Imm:         Endocrine:           Physical Exam:  Vitals: /79   Pulse 72   Ht 1.651 m (5' 5\")   Wt 77 kg (169 lb 12.8 oz)   LMP  (LMP Unknown)   SpO2 94%   BMI 28.26 kg/m       Constitutional:  cooperative, alert and oriented, well developed, well nourished, in no acute distress        Skin:  warm and dry to the touch, no apparent skin lesions or masses noted   pacemaker incision in the left infraclavicular " area was well-healed      Head:  normocephalic, no masses or lesions        Eyes:  conjunctivae and lids unremarkable        ENT:  no pallor or cyanosis, dentition good        Neck:  JVP normal        Respiratory:  clear to auscultation diminished breath sounds bilaterally       Cardiac: regular rhythm;normal S1 and S2;no murmurs, gallops or rubs detected                pulses full and equal, no bruits auscultated                                        GI:  abdomen soft        Extremities and Muscular Skeletal:  no edema kyphosis            Neurological:  no gross motor deficits;affect appropriate        Psych:  Alert and Oriented x 3;affect appropriate, oriented to time, person and place;oriented to time, person and place          Thank you for allowing me to participate in the care of your patient.    Sincerely,     Angelica Grady NP, APRN [facility]

## 2019-05-01 NOTE — PROGRESS NOTES
"Helene Gibbs  MRN: [id number]  Gender Identity: Female    HPI:  Ms. Gibbs is a delightful 83-year-old woman well known to [facility].  She is an established patient of Dr. Juarez.  She was recently seen by Dr. Gomez in consultation for possible Watchman 02/25/2019.      Ms. Gibbs's past medical history includes:     1.  Permanent atrial fibrillation with AV alexandra ablation and pacemaker implantation in 2012, on apixaban 2.5 mg b.i.d.   2.  Chronic diastolic heart failure (HFpEF), moderate RV dysfunction.     3.  Non-STEMI in 05/2008 based on troponin bump of 0.7.  Nuclear stress test showed mild apical ischemia being treated medically.   4.  Hypertension.   5.  Mild pulmonary hypertension.   6.  History of breast cancer.   7.  Severe anemia with recent admission 02/13/2019.  Colonoscopy revealed angioectasias.      8.  Mitral regurgitation, moderate/severe.     9. CKD with creat baseline 1.4-1.6, and GFR 35    At today's visit Helene is doing well.  Currently, she denies chest pain, shortness of breath, lightheadedness or dizziness. Her weight is up 10 lbs from our last visit. She reports eating more \"sweets\" than usual. She suffers from anemia, and is high risk for falls. The last fall was 02/11/2019. Watchman was recommended both by Dr. Esparza and Dr. Gomez and is scheduled 5/7/19. She is scheduled for a JOAN on Monday, 5/6/19.     All other review of systems and past medical history are noted below.     Plan:   Ms. Gibbs is a delightful 83-year-old woman here today and is stable from a cardiac standpoint.      1.  Chronic diastolic heart failure and RV dysfunction  Most recent admission was 02/2019 for anemia.  She appears to be euvolemic on exam despite her weight being elevated 169 lbs.  She is on torsemide 20 mg daily and spironolactone.      2.  Persistent atrial fibrillation with AV alexandra ablation  [id number] (gender, age, HTC, CHF)  HAS-BLED- 2 (age, bleeding)    The Watchman " procedure was reviewed with the patient today and at our visit last month.  An informational packet was given to her in April which she reviewed.   She understands this will be done under general anesthesia, will be an overnight hospital stay, barring any complications. Risks and benefits were reviewed and include but are not limited to: bruising,bleeding, vascular injury, pericardial effusion with possibility of tamponade, MI,CVA, emergent surgery, and a rare incidence of death, Patient understands and is willing to proceed.      Eliquis will be held for 2 days prior to the procedure per .    3.  Anemia/angioectasias noted on colonoscopy.   The patient has been followed by GI and has had a colonoscopy and endoscopy.  She understands that there is high risk for her being on anticoagulation long-term which is why she wants to proceed with Watchman. Pt has been feeling weak. Repeat HGB will be assessed on Monday.    JOAN is scheduled to assess for MISAEL thrombus and measurement for her device. She will be NPO.  Risks and benefits were reviewed today. They include, but are not limited to: sore throat, dysphagia, esophageal injury, bradyarrhythmia's. Pt. Understands and is willing to proceed. She has an upper plate that she plans on leaving at home. She has had endoscopies in the past and has not had issues with advancing the probe. Additional blood work will be completed at that time. Consent will be signed with the procedural physician.      She understands she will be on apixaban and baby aspirin for 45 days post-procedure.  She will then be switched from apixaban to clopidogrel and baby aspirin for 4.5 months.  After month #6, the patient will be on aspirin only.  Also, she understands that she will need to have a transesophageal echo and a repeat CT scan 45 days after the device is implanted to assure that it is well seated.      Thank you for including me in her care.     Angelica Grady, FRED       No orders of  the defined types were placed in this encounter.      No orders of the defined types were placed in this encounter.      There are no discontinued medications.      No diagnosis found.    CURRENT MEDICATIONS:  Current Outpatient Medications   Medication Sig Dispense Refill     ACCU-CHEK SMARTVIEW test strip USE 1 STRIP BY IN VITRO ROUTE 2 TIMES DAILY OR AS DIRECTED 200 strip 0     ACE/ARB/ARNI NOT PRESCRIBED (INTENTIONAL) Please choose reason not prescribed, below       albuterol (PROAIR HFA, PROVENTIL HFA, VENTOLIN HFA) 108 (90 BASE) MCG/ACT inhaler Inhale 2 puffs into the lungs every 4 hours as needed for shortness of breath / dyspnea or wheezing 1 Inhaler 5     apixaban ANTICOAGULANT (ELIQUIS) 2.5 MG tablet Take 1 tablet (2.5 mg) by mouth 2 times daily 60 tablet 1     betamethasone valerate (VALISONE) 0.1 % external cream APPLY TOPICALLY 2 TIMES DAILY USE SPARINGLY 15 g 1     blood glucose monitoring (NO BRAND SPECIFIED) meter device kit Accucheck Mariza meter; verified with pharmacist 1 kit 0     denosumab (PROLIA) 60 MG/ML SOLN injection Inject 1 mL (60 mg) Subcutaneous every 6 months 1 mL 1     fluticasone (FLONASE) 50 MCG/ACT nasal spray Spray 2 sprays into both nostrils daily 15.8 mL 11     glipiZIDE (GLUCOTROL) 5 MG tablet Take 1 tablet (5 mg) by mouth every morning (before breakfast) 90 tablet 1     hydrocortisone (CORTAID) 1 % external cream Apply topically 3 times daily as needed for itching       hydrOXYzine (VISTARIL) 25 MG capsule Take 1 capsule (25 mg) by mouth 2 times daily as needed for itching 180 capsule 1     hypromellose (ARTIFICIAL TEARS) 0.5 % SOLN ophthalmic solution Place 1 drop into both eyes every hour as needed for dry eyes       lidocaine (LIDODERM) 5 % patch        LYRICA 75 MG capsule TAKE 1 CAPSULE BY MOUTH TWICE A DAY 60 capsule 1     metoprolol tartrate (LOPRESSOR) 25 MG tablet TAKE 1 TABLET BY MOUTH TWICE A DAY 90 tablet 1     PROCTOSOL HC 2.5 % cream PLACE RECTALLY 2 TIMES DAILY AS  NEEDED FOR HEMORRHOIDS 28.35 g 1     ranitidine (ZANTAC) 150 MG tablet Take 1 tablet (150 mg) by mouth At Bedtime 90 tablet 3     simvastatin (ZOCOR) 10 MG tablet Take 1 tablet (10 mg) by mouth At Bedtime 90 tablet 3     spironolactone (ALDACTONE) 25 MG tablet Take 1-2 tablets (25-50 mg) by mouth daily 60 tablet 3     tiotropium (SPIRIVA) 18 MCG inhaled capsule Inhale 1 capsule (18 mcg) into the lungs daily 90 capsule 1     torsemide (DEMADEX) 20 MG tablet TAKE 1 TABLET BY MOUTH EVERY DAY 90 tablet 1     ASPIRIN NOT PRESCRIBED (INTENTIONAL) Please choose reason not prescribed, below       ondansetron (ZOFRAN ODT) 4 MG ODT tab Take 1 tablet (4 mg) by mouth every 6 hours as needed for nausea (Patient not taking: Reported on 5/1/2019) 30 tablet 1       ALLERGIES     Allergies   Allergen Reactions     Ambien [Zolpidem Tartrate] Visual Disturbance     Hallucinations and fell out of bed at night.     Penicillins Hives     Definity      Caused a syncopal episode.     Sulfa Drugs Itching     Cymbalta Rash     Fluconazole Rash     Tolerates miconazole suppositories       PAST MEDICAL HISTORY:  Past Medical History:   Diagnosis Date     Anemia      Ankle arthritis      Arthritis      Back pain      Bell's palsy 9/08    left     Breast CA (H) 2004-    left lumpectomy, radiation, -recurrence     Breast cancer (H) 2004    Left breast lumpectomy w LN disection, and radiation therapy     CKD (chronic kidney disease)     stage 3 baseline Cr 1.3     Colon polyps     colonoscopy-9/12-next due in 9/13     Congestive heart failure (H)      COPD (chronic obstructive pulmonary disease) (H)      Coronary artery disease      DM (diabetes mellitus) (H)      GERD (gastroesophageal reflux disease)      Hyperlipidemia      Hypertension      Lumbar spinal stenosis     use cane     Nicotine dependence      Obesity      Osteoporosis      Other chronic pain      Pacemaker      Permanent atrial fibrillation (H) 8/2008    S/P AV node ablation and  pacemaker 10-25-12       Pleural effusion      Pulmonary hypertension (H)      PVD (peripheral vascular disease) (H)      Rectal stenosis      Tobacco abuse     current     Tricuspid regurgitation        PAST SURGICAL HISTORY:  Past Surgical History:   Procedure Laterality Date     ARTHROPLASTY HIP Left 10/20/2018    Procedure: LEFT HIP HERACLIO-ARTHROPLASTY ;  Surgeon: Ish Fish MD;  Location:  OR     ARTHROSCOPY SHOULDER RT/LT  1999, 2004    Bilateral, right then left     BONE MARROW BIOPSY, BONE SPECIMEN, NEEDLE/TROCAR N/A 8/29/2017    Procedure: BIOPSY BONE MARROW;  BONE MARROW BIOPSY;  Surgeon: Marino Cohen MD;  Location:  GI     C DEXA, BONE DENSITY, AXIAL SKEL       C MAMMOGRAM, SCREENING  1/2009     COLONOSCOPY N/A 10/7/2016    Procedure: COMBINED COLONOSCOPY, SINGLE OR MULTIPLE BIOPSY/POLYPECTOMY BY BIOPSY;  Surgeon: Cassandra Mccord MD;  Location:  GI     COLONOSCOPY N/A 2/15/2019    Procedure: COLONOSCOPY;  Surgeon: Juan Pablo Hayden DO;  Location:  GI     ESOPHAGOSCOPY, GASTROSCOPY, DUODENOSCOPY (EGD), COMBINED N/A 8/10/2017    Procedure: COMBINED ESOPHAGOSCOPY, GASTROSCOPY, DUODENOSCOPY (EGD), BIOPSY SINGLE OR MULTIPLE;  gastroscopy;  Surgeon: Helene Bustamante MD;  Location:  GI     ESOPHAGOSCOPY, GASTROSCOPY, DUODENOSCOPY (EGD), COMBINED N/A 2/15/2019    Procedure: COMBINED ESOPHAGOSCOPY, GASTROSCOPY, DUODENOSCOPY (EGD);  Surgeon: Juan Pablo Hayden DO;  Location:  GI     H ABLATION AV NODE  10/2012     HC COLONOSCOPY THRU STOMA, DIAGNOSTIC  ? 2005     IMPLANT PACEMAKER  10/2012    [place] [id number], [id number]     IRRIGATION AND DEBRIDEMENT LOWER EXTREMITY, COMBINED Left 12/21/2018    Procedure: INCISION AND DRAINAGE OF LEFT HIP WITH EXCISION OF BONE FRAGMENT;  Surgeon: Ish Fish MD;  Location:  OR     JOINT REPLACEMTN, KNEE RT/LT      Joint Replacement knee bilateral     LAPAROSCOPIC CHOLECYSTECTOMY WITH CHOLANGIOGRAMS N/A 12/14/2015    Procedure: LAPAROSCOPIC  CHOLECYSTECTOMY WITH CHOLANGIOGRAMS;  Surgeon: Ervin Amos MD;  Location:  OR     LUMPECTOMY BREAST      Left-     PHACOEMULSIFICATION CLEAR CORNEA WITH STANDARD INTRAOCULAR LENS IMPLANT Left 2015    Procedure: PHACOEMULSIFICATION CLEAR CORNEA WITH STANDARD INTRAOCULAR LENS IMPLANT;  Surgeon: Sai Obregon MD;  Location: [facility]     PHACOEMULSIFICATION CLEAR CORNEA WITH TORIC INTRAOCULAR LENS IMPLANT Right 2017    Procedure: PHACOEMULSIFICATION CLEAR CORNEA WITH TORIC INTRAOCULAR LENS IMPLANT;  RIGHT EYE PHACOEMULSIFICATION CLEAR CORNEA WITH TORIC INTRAOCULAR LENS IMPLANT ;  Surgeon: Duc Richmond MD;  Location: [facility]       FAMILY HISTORY:  Family History   Problem Relation Age of Onset     Hypertension Mother      Diabetes Mother          at 83 yrs     C.A.D. Father          age 70s     Breast Cancer No family hx of      Colon Cancer No family hx of        SOCIAL HISTORY:  Social History     Socioeconomic History     Marital status:      Spouse name: None     Number of children: 0     Years of education: None     Highest education level: None   Occupational History     None   Social Needs     Financial resource strain: None     Food insecurity:     Worry: None     Inability: None     Transportation needs:     Medical: None     Non-medical: None   Tobacco Use     Smoking status: Former Smoker     Packs/day: 0.25     Years: 45.00     Pack years: 11.25     Types: Cigarettes     Last attempt to quit: 2018     Years since quittin.9     Smokeless tobacco: Never Used   Substance and Sexual Activity     Alcohol use: No     Alcohol/week: 0.0 oz     Drug use: No     Sexual activity: Not Currently     Partners: Female   Lifestyle     Physical activity:     Days per week: None     Minutes per session: None     Stress: None   Relationships     Social connections:     Talks on phone: None     Gets together: None     Attends Congregational service: None     Active member of  "club or organization: None     Attends meetings of clubs or organizations: None     Relationship status: None     Intimate partner violence:     Fear of current or ex partner: None     Emotionally abused: None     Physically abused: None     Forced sexual activity: None   Other Topics Concern     Parent/sibling w/ CABG, MI or angioplasty before 65F 55M? Not Asked      Service Not Asked     Blood Transfusions Not Asked     Caffeine Concern Yes     Comment: 2 cups/day     Occupational Exposure Not Asked     Hobby Hazards Not Asked     Sleep Concern Yes     Stress Concern Yes     Weight Concern Yes     Comment: 15+ lb weight loss since hospitalization      Special Diet Yes     Comment: bland diet r/t cholecystectomy, low salt      Back Care Not Asked     Exercise No     Bike Helmet Not Asked     Seat Belt Yes     Self-Exams Not Asked   Social History Narrative    ,no childrenPOA- niece-Enedelia silvestre.  Lived in [place] and [place] worked in [employer].  Desires DNR/DNI.  (last updated 12/19/2018)        Review of Systems:  Skin:          Eyes:         ENT:         Respiratory:          Cardiovascular:         Gastroenterology:        Genitourinary:         Musculoskeletal:         Neurologic:         Psychiatric:         Heme/Lymph/Imm:         Endocrine:           Physical Exam:  Vitals: /79   Pulse 72   Ht 1.651 m (5' 5\")   Wt 77 kg (169 lb 12.8 oz)   LMP  (LMP Unknown)   SpO2 94%   BMI 28.26 kg/m      Constitutional:  cooperative, alert and oriented, well developed, well nourished, in no acute distress        Skin:  warm and dry to the touch, no apparent skin lesions or masses noted   pacemaker incision in the left infraclavicular area was well-healed      Head:  normocephalic, no masses or lesions        Eyes:  conjunctivae and lids unremarkable        ENT:  no pallor or cyanosis, dentition good        Neck:  JVP normal        Respiratory:  clear to auscultation diminished " breath sounds bilaterally       Cardiac: regular rhythm;normal S1 and S2;no murmurs, gallops or rubs detected                pulses full and equal, no bruits auscultated                                        GI:  abdomen soft        Extremities and Muscular Skeletal:  no edema kyphosis            Neurological:  no gross motor deficits;affect appropriate        Psych:  Alert and Oriented x 3;affect appropriate, oriented to time, person and place;oriented to time, person and place        CC  No referring provider defined for this encounter.

## 2019-05-01 NOTE — LETTER
"5/1/2019    Neisha Esparza MD  [address]  [facility] [address]    RE: Helene Gibbs       Dear Colleague,    I had the pleasure of seeing Helene Gibbs in the [facility].    Helene Gibbs  MRN: [id number]  Gender Identity: Female    HPI:  Ms. Gibbs is a delightful 83-year-old woman well known to me here at the Heart Clinic.  She is an established patient of Dr. Juarez.  She was recently seen by Dr. Gomez in consultation for possible Watchman 02/25/2019.      Ms. Gibbs's past medical history includes:     1.  Permanent atrial fibrillation with AV alexandra ablation and pacemaker implantation in 2012, on apixaban 2.5 mg b.i.d.   2.  Chronic diastolic heart failure (HFpEF), moderate RV dysfunction.     3.  Non-STEMI in 05/2008 based on troponin bump of 0.7.  Nuclear stress test showed mild apical ischemia being treated medically.   4.  Hypertension.   5.  Mild pulmonary hypertension.   6.  History of breast cancer.   7.  Severe anemia with recent admission 02/13/2019.  Colonoscopy revealed angioectasias.      8.  Mitral regurgitation, moderate/severe.     9. CKD with creat baseline 1.4-1.6, and GFR 35    At today's visit Helene is doing well.  Currently, she denies chest pain, shortness of breath, lightheadedness or dizziness. Her weight is up 10 lbs from our last visit. She reports eating more \"sweets\" than usual. She suffers from anemia, and is high risk for falls. The last fall was 02/11/2019. Watchman was recommended both by Dr. Esparza and Dr. Gomez and is scheduled 5/7/19. She is scheduled for a JOAN on Monday, 5/6/19.     All other review of systems and past medical history are noted below.     Plan:   Ms. Gibbs is a delightful 83-year-old woman here today and is stable from a cardiac standpoint.      1.  Chronic diastolic heart failure and RV dysfunction  Most recent admission was 02/2019 for anemia.  She appears to be euvolemic on exam " despite her weight being elevated 169 lbs.  She is on torsemide 20 mg daily and spironolactone.      2.  Persistent atrial fibrillation with AV alexandra ablation  [id number] (gender, age, HTC, CHF)  HAS-BLED- 2 (age, bleeding)    The Watchman procedure was reviewed with the patient today and at our visit last month.  An informational packet was given to her in April which she reviewed.   She understands this will be done under general anesthesia, will be an overnight hospital stay, barring any complications. Risks and benefits were reviewed and include but are not limited to: bruising,bleeding, vascular injury, pericardial effusion with possibility of tamponade, MI,CVA, emergent surgery, and a rare incidence of death, Patient understands and is willing to proceed.      Eliquis will be held for 2 days prior to the procedure per .    3.  Anemia/angioectasias noted on colonoscopy.   The patient has been followed by GI and has had a colonoscopy and endoscopy.  She understands that there is high risk for her being on anticoagulation long-term which is why she wants to proceed with Watchman. Pt has been feeling weak. Repeat HGB will be assessed on Monday.    JOAN is scheduled to assess for MISAEL thrombus and measurement for her device. She will be NPO.  Risks and benefits were reviewed today. They include, but are not limited to: sore throat, dysphagia, esophageal injury, bradyarrhythmia's. Pt. Understands and is willing to proceed. She has an upper plate that she plans on leaving at home. She has had endoscopies in the past and has not had issues with advancing the probe. Additional blood work will be completed at that time. Consent will be signed with the procedural physician.      She understands she will be on apixaban and baby aspirin for 45 days post-procedure.  She will then be switched from apixaban to clopidogrel and baby aspirin for 4.5 months.  After month #6, the patient will be on aspirin only.  Also,  she understands that she will need to have a transesophageal echo and a repeat CT scan 45 days after the device is implanted to assure that it is well seated.      Thank you for including me in her care.     Angelica Grady NP       No orders of the defined types were placed in this encounter.      No orders of the defined types were placed in this encounter.      There are no discontinued medications.      No diagnosis found.    CURRENT MEDICATIONS:  Current Outpatient Medications   Medication Sig Dispense Refill     ACCU-CHEK SMARTVIEW test strip USE 1 STRIP BY IN VITRO ROUTE 2 TIMES DAILY OR AS DIRECTED 200 strip 0     ACE/ARB/ARNI NOT PRESCRIBED (INTENTIONAL) Please choose reason not prescribed, below       albuterol (PROAIR HFA, PROVENTIL HFA, VENTOLIN HFA) 108 (90 BASE) MCG/ACT inhaler Inhale 2 puffs into the lungs every 4 hours as needed for shortness of breath / dyspnea or wheezing 1 Inhaler 5     apixaban ANTICOAGULANT (ELIQUIS) 2.5 MG tablet Take 1 tablet (2.5 mg) by mouth 2 times daily 60 tablet 1     betamethasone valerate (VALISONE) 0.1 % external cream APPLY TOPICALLY 2 TIMES DAILY USE SPARINGLY 15 g 1     blood glucose monitoring (NO BRAND SPECIFIED) meter device kit Accucheck Mariza meter; verified with pharmacist 1 kit 0     denosumab (PROLIA) 60 MG/ML SOLN injection Inject 1 mL (60 mg) Subcutaneous every 6 months 1 mL 1     fluticasone (FLONASE) 50 MCG/ACT nasal spray Spray 2 sprays into both nostrils daily 15.8 mL 11     glipiZIDE (GLUCOTROL) 5 MG tablet Take 1 tablet (5 mg) by mouth every morning (before breakfast) 90 tablet 1     hydrocortisone (CORTAID) 1 % external cream Apply topically 3 times daily as needed for itching       hydrOXYzine (VISTARIL) 25 MG capsule Take 1 capsule (25 mg) by mouth 2 times daily as needed for itching 180 capsule 1     hypromellose (ARTIFICIAL TEARS) 0.5 % SOLN ophthalmic solution Place 1 drop into both eyes every hour as needed for dry eyes       lidocaine  (LIDODERM) 5 % patch        LYRICA 75 MG capsule TAKE 1 CAPSULE BY MOUTH TWICE A DAY 60 capsule 1     metoprolol tartrate (LOPRESSOR) 25 MG tablet TAKE 1 TABLET BY MOUTH TWICE A DAY 90 tablet 1     PROCTOSOL HC 2.5 % cream PLACE RECTALLY 2 TIMES DAILY AS NEEDED FOR HEMORRHOIDS 28.35 g 1     ranitidine (ZANTAC) 150 MG tablet Take 1 tablet (150 mg) by mouth At Bedtime 90 tablet 3     simvastatin (ZOCOR) 10 MG tablet Take 1 tablet (10 mg) by mouth At Bedtime 90 tablet 3     spironolactone (ALDACTONE) 25 MG tablet Take 1-2 tablets (25-50 mg) by mouth daily 60 tablet 3     tiotropium (SPIRIVA) 18 MCG inhaled capsule Inhale 1 capsule (18 mcg) into the lungs daily 90 capsule 1     torsemide (DEMADEX) 20 MG tablet TAKE 1 TABLET BY MOUTH EVERY DAY 90 tablet 1     ASPIRIN NOT PRESCRIBED (INTENTIONAL) Please choose reason not prescribed, below       ondansetron (ZOFRAN ODT) 4 MG ODT tab Take 1 tablet (4 mg) by mouth every 6 hours as needed for nausea (Patient not taking: Reported on 5/1/2019) 30 tablet 1       ALLERGIES     Allergies   Allergen Reactions     Ambien [Zolpidem Tartrate] Visual Disturbance     Hallucinations and fell out of bed at night.     Penicillins Hives     Definity      Caused a syncopal episode.     Sulfa Drugs Itching     Cymbalta Rash     Fluconazole Rash     Tolerates miconazole suppositories       PAST MEDICAL HISTORY:  Past Medical History:   Diagnosis Date     Anemia      Ankle arthritis      Arthritis      Back pain      Bell's palsy 9/08    left     Breast CA (H) 2004-    left lumpectomy, radiation, -recurrence     Breast cancer (H) 2004    Left breast lumpectomy w LN disection, and radiation therapy     CKD (chronic kidney disease)     stage 3 baseline Cr 1.3     Colon polyps     colonoscopy-9/12-next due in 9/13     Congestive heart failure (H)      COPD (chronic obstructive pulmonary disease) (H)      Coronary artery disease      DM (diabetes mellitus) (H)      GERD (gastroesophageal reflux  disease)      Hyperlipidemia      Hypertension      Lumbar spinal stenosis     use cane     Nicotine dependence      Obesity      Osteoporosis      Other chronic pain      Pacemaker      Permanent atrial fibrillation (H) 8/2008    S/P AV node ablation and pacemaker 10-25-12       Pleural effusion      Pulmonary hypertension (H)      PVD (peripheral vascular disease) (H)      Rectal stenosis      Tobacco abuse     current     Tricuspid regurgitation        PAST SURGICAL HISTORY:  Past Surgical History:   Procedure Laterality Date     ARTHROPLASTY HIP Left 10/20/2018    Procedure: LEFT HIP HERACLIO-ARTHROPLASTY ;  Surgeon: Ish Fish MD;  Location:  OR     ARTHROSCOPY SHOULDER RT/LT  1999, 2004    Bilateral, right then left     BONE MARROW BIOPSY, BONE SPECIMEN, NEEDLE/TROCAR N/A 8/29/2017    Procedure: BIOPSY BONE MARROW;  BONE MARROW BIOPSY;  Surgeon: Marino Cohen MD;  Location:  GI     C DEXA, BONE DENSITY, AXIAL SKEL       C MAMMOGRAM, SCREENING  1/2009     COLONOSCOPY N/A 10/7/2016    Procedure: COMBINED COLONOSCOPY, SINGLE OR MULTIPLE BIOPSY/POLYPECTOMY BY BIOPSY;  Surgeon: Cassandra Mccord MD;  Location:  GI     COLONOSCOPY N/A 2/15/2019    Procedure: COLONOSCOPY;  Surgeon: Juan Pablo Hayden DO;  Location:  GI     ESOPHAGOSCOPY, GASTROSCOPY, DUODENOSCOPY (EGD), COMBINED N/A 8/10/2017    Procedure: COMBINED ESOPHAGOSCOPY, GASTROSCOPY, DUODENOSCOPY (EGD), BIOPSY SINGLE OR MULTIPLE;  gastroscopy;  Surgeon: Helene Bustamante MD;  Location:  GI     ESOPHAGOSCOPY, GASTROSCOPY, DUODENOSCOPY (EGD), COMBINED N/A 2/15/2019    Procedure: COMBINED ESOPHAGOSCOPY, GASTROSCOPY, DUODENOSCOPY (EGD);  Surgeon: Juan Pablo Hayden DO;  Location:  GI     H ABLATION AV NODE  10/2012     HC COLONOSCOPY THRU STOMA, DIAGNOSTIC  ? 2005     IMPLANT PACEMAKER  10/2012    [place] [id number], [id number]     IRRIGATION AND DEBRIDEMENT LOWER EXTREMITY, COMBINED Left 12/21/2018    Procedure: INCISION AND DRAINAGE OF  LEFT HIP WITH EXCISION OF BONE FRAGMENT;  Surgeon: Ish Fish MD;  Location:  OR     JOINT REPLACEMTN, KNEE RT/LT      Joint Replacement knee bilateral     LAPAROSCOPIC CHOLECYSTECTOMY WITH CHOLANGIOGRAMS N/A 2015    Procedure: LAPAROSCOPIC CHOLECYSTECTOMY WITH CHOLANGIOGRAMS;  Surgeon: Ervin Amos MD;  Location:  OR     LUMPECTOMY BREAST      Left-     PHACOEMULSIFICATION CLEAR CORNEA WITH STANDARD INTRAOCULAR LENS IMPLANT Left 2015    Procedure: PHACOEMULSIFICATION CLEAR CORNEA WITH STANDARD INTRAOCULAR LENS IMPLANT;  Surgeon: Sai Obregon MD;  Location:  EC     PHACOEMULSIFICATION CLEAR CORNEA WITH TORIC INTRAOCULAR LENS IMPLANT Right 2017    Procedure: PHACOEMULSIFICATION CLEAR CORNEA WITH TORIC INTRAOCULAR LENS IMPLANT;  RIGHT EYE PHACOEMULSIFICATION CLEAR CORNEA WITH TORIC INTRAOCULAR LENS IMPLANT ;  Surgeon: Duc Richmond MD;  Location: [facility]       FAMILY HISTORY:  Family History   Problem Relation Age of Onset     Hypertension Mother      Diabetes Mother          at 83 yrs     C.A.D. Father          age 70s     Breast Cancer No family hx of      Colon Cancer No family hx of        SOCIAL HISTORY:  Social History     Socioeconomic History     Marital status:      Spouse name: None     Number of children: 0     Years of education: None     Highest education level: None   Occupational History     None   Social Needs     Financial resource strain: None     Food insecurity:     Worry: None     Inability: None     Transportation needs:     Medical: None     Non-medical: None   Tobacco Use     Smoking status: Former Smoker     Packs/day: 0.25     Years: 45.00     Pack years: 11.25     Types: Cigarettes     Last attempt to quit: 2018     Years since quittin.9     Smokeless tobacco: Never Used   Substance and Sexual Activity     Alcohol use: No     Alcohol/week: 0.0 oz     Drug use: No     Sexual activity: Not Currently     Partners: Female  "  Lifestyle     Physical activity:     Days per week: None     Minutes per session: None     Stress: None   Relationships     Social connections:     Talks on phone: None     Gets together: None     Attends Sikh service: None     Active member of club or organization: None     Attends meetings of clubs or organizations: None     Relationship status: None     Intimate partner violence:     Fear of current or ex partner: None     Emotionally abused: None     Physically abused: None     Forced sexual activity: None   Other Topics Concern     Parent/sibling w/ CABG, MI or angioplasty before 65F 55M? Not Asked      Service Not Asked     Blood Transfusions Not Asked     Caffeine Concern Yes     Comment: 2 cups/day     Occupational Exposure Not Asked     Hobby Hazards Not Asked     Sleep Concern Yes     Stress Concern Yes     Weight Concern Yes     Comment: 15+ lb weight loss since hospitalization      Special Diet Yes     Comment: bland diet r/t cholecystectomy, low salt      Back Care Not Asked     Exercise No     Bike Helmet Not Asked     Seat Belt Yes     Self-Exams Not Asked   Social History Narrative    ,no childrenPOA- niece-Enedelia silvestre.  Lived in [place] and [place] worked in [employer].  Desires DNR/DNI.  (last updated 12/19/2018)        Review of Systems:  Skin:          Eyes:         ENT:         Respiratory:          Cardiovascular:         Gastroenterology:        Genitourinary:         Musculoskeletal:         Neurologic:         Psychiatric:         Heme/Lymph/Imm:         Endocrine:           Physical Exam:  Vitals: /79   Pulse 72   Ht 1.651 m (5' 5\")   Wt 77 kg (169 lb 12.8 oz)   LMP  (LMP Unknown)   SpO2 94%   BMI 28.26 kg/m       Constitutional:  cooperative, alert and oriented, well developed, well nourished, in no acute distress        Skin:  warm and dry to the touch, no apparent skin lesions or masses noted   pacemaker incision in the left infraclavicular " area was well-healed      Head:  normocephalic, no masses or lesions        Eyes:  conjunctivae and lids unremarkable        ENT:  no pallor or cyanosis, dentition good        Neck:  JVP normal        Respiratory:  clear to auscultation diminished breath sounds bilaterally       Cardiac: regular rhythm;normal S1 and S2;no murmurs, gallops or rubs detected                pulses full and equal, no bruits auscultated                                        GI:  abdomen soft        Extremities and Muscular Skeletal:  no edema kyphosis            Neurological:  no gross motor deficits;affect appropriate        Psych:  Alert and Oriented x 3;affect appropriate, oriented to time, person and place;oriented to time, person and place        CC  No referring provider defined for this encounter.                Thank you for allowing me to participate in the care of your patient.      Sincerely,     Angelica Grady, FRED, APRN [facility]    cc:   No referring provider defined for this encounter.

## 2019-05-03 PROBLEM — J96.01 ACUTE RESPIRATORY FAILURE WITH HYPOXIA (H): Status: ACTIVE | Noted: 2019-01-01

## 2019-05-03 NOTE — ED NOTES
"[facility]  ED Nurse Handoff Report    ED Chief complaint: Generalized Weakness      ED Diagnosis:   Final diagnoses:   Acute respiratory failure with hypoxia (H)   Acute on chronic systolic congestive heart failure (H)       Code Status: DNR / DNI    Allergies:   Allergies   Allergen Reactions     Ambien [Zolpidem Tartrate] Visual Disturbance     Hallucinations and fell out of bed at night.     Penicillins Hives     Definity      Caused a syncopal episode.     Sulfa Drugs Itching     Cymbalta Rash     Fluconazole Rash     Tolerates miconazole suppositories       Activity level - Baseline/Home:  Independent    Activity Level - Current:   Stand with Assist     Needed?: No    Isolation: none  Infection: Not Applicable  Bariatric?: No    Vital Signs:   Vitals:    05/03/19 1647 05/03/19 1651 05/03/19 1745 05/03/19 1746   BP:   131/67    Pulse: 70  70    Resp:       Temp:  98.1  F (36.7  C)     TempSrc:  Temporal     SpO2:    96%       Cardiac Rhythm: ,        Pain level:      Is this patient confused?: No   Does this patient have a guardian?  No         If yes, is there guardianship documents in the Epic \"Code/ACP\" activity?  N/A         Guardian Notified?  N/A  [place] - Suicide Severity Rating Scale Completed?  Yes  If yes, what color did the patient score?  White    Patient Report: Initial Complaint: Pt presents to the ED with complaints of generalized weakness for the last several days.    Focused Assessment: Pt reports generalized weakness over the last several days.  Pt also reports increased SOB with activity and use of 1 liter O2 via NC in the day, pt usually only uses home O2 while sleeping.  Pt has an extensive health history including a-fib, CHF, CKD, Gi bleeds, NSTEMI, diabetes and COPD and pacemaker placement.  Pt O2 sats initally 85% on room air, this improved to the mid 90s on 3-4 liter O2 via NC.  Pt alert and oriented x4 and cooperative with cares.      Tests Performed: "   Labs Ordered and Resulted from Time of ED Arrival Up to the Time of Departure from the ED   ROUTINE UA WITH MICROSCOPIC - Abnormal; Notable for the following components:       Result Value    pH Urine 7.5 (*)     Leukocyte Esterase Urine Trace (*)     RBC Urine 8 (*)     Bacteria Urine Few (*)     Squamous Epithelial /HPF Urine 6 (*)     Mucous Urine Present (*)     Hyaline Casts 8 (*)     All other components within normal limits   CBC WITH PLATELETS DIFFERENTIAL - Abnormal; Notable for the following components:    RBC Count 3.79 (*)     Hemoglobin 9.5 (*)     Hematocrit 32.2 (*)     MCH 25.1 (*)     MCHC 29.5 (*)     RDW 19.4 (*)     Absolute Lymphocytes 0.6 (*)     All other components within normal limits   COMPREHENSIVE METABOLIC PANEL - Abnormal; Notable for the following components:    Carbon Dioxide 33 (*)     Glucose 167 (*)     Urea Nitrogen 40 (*)     Creatinine 1.84 (*)     GFR Estimate 25 (*)     GFR Estimate If Black 29 (*)     Albumin 3.3 (*)     All other components within normal limits   NT PROBNP INPATIENT - Abnormal; Notable for the following components:    N-Terminal Pro BNP Inpatient 9,289 (*)     All other components within normal limits   TROPONIN I   ISTAT GAS OR ELECTROLYTE NURSING POCT   PULSE OXIMETRY NURSING   CARDIAC CONTINUOUS MONITORING   PERIPHERAL IV CATHETER   ISTAT  GASES LACTATE ISSA POCT   ABO/RH TYPE AND SCREEN   Chest X-ray report is as follows :IMPRESSION: Lungs are hypoinflated with perihilar and basilar opacities which may represent atelectasis, aspiration, or infection. Component of edema cannot be excluded. Small bilateral pleural effusions. Cardiac silhouette is enlarged, unchanged. Dual lead cardiac pacing device is unchanged. Spinal degenerative change and vertebroplasty change noted.    Abnormal Results: see above  Treatments provided: 20 g IV placed in right hand    Family Comments: none    OBS brochure/video discussed/provided to patient/family: N/A               Name of person given brochure if not patient: NA              Relationship to patient: NA    ED Medications:   Medications   sodium chloride (PF) 0.9% PF flush 3 mL (has no administration in time range)   sodium chloride (PF) 0.9% PF flush 3 mL (has no administration in time range)       Drips infusing?:  No    For the majority of the shift this patient was Green.   Interventions performed were NA.    Severe Sepsis OR Septic Shock Diagnosis Present: No    To be done/followed up on inpatient unit:  NA    ED NURSE PHONE NUMBER: [phone]

## 2019-05-03 NOTE — TELEPHONE ENCOUNTER
Reason for call:  Patient reporting a symptom    Symptom or request: Pt states she does not feel right, provided no symptoms. Pt believes her hemoglobin is low.     Duration (how long have symptoms been present): Today     Have you been treated for this before? No    Additional comments: Unknown    Phone Number patient can be reached at:  Cell number on file:    Telephone Information:   Mobile [phone]       Best Time:  Anytime    Can we leave a detailed message on this number:  YES    Call taken on 5/3/2019 at 3:16 PM by Driss Hernandez

## 2019-05-03 NOTE — LETTER
Transition Communication Hand-off for Care Transitions to Next Level of Care Provider    Name: Helene Gibbs  : 1935  MRN #: [id number]  Primary Care Provider: Neisha Esparza     Primary Clinic: [address] [facility] [address]  [facility] [address]     Reason for Hospitalization:  Acute on chronic systolic congestive heart failure (H) [I50.23]  Acute respiratory failure with hypoxia (H) [J96.01]  Admit Date/Time: 5/3/2019  4:28 PM  Discharge Date: 5/10/19  Payor Source: Payor: MEDICARE / Plan: MEDICARE / Product Type: Medicare /     Readmission Assessment Measure (DONIS) Risk Score/category: Elevated             Reason for Communication Hand-off Referral: Admission diagnoses: CHF    Discharge Plan: [facility]        Concern for non-adherence with plan of care:   Y/N: No  Discharge Needs Assessment:  Needs      Most Recent Value   Equipment Currently Used at Home  walker, rolling   # of Referrals Placed by CTS  Senior Linkage Line, Post Acute Facilities, Durable Medical Equipment (DME), Transportation   Skilled Nursing Facility  [facility] [phone], Fax: [phone]   PAS Number  [id number]          Already enrolled in Tele-monitoring program and name of program:  no  Follow-up specialty is recommended: Yes    Follow-up plan:    Future Appointments   Date Time Provider Department Center   2019  5:30 AM Megha Thao, OT [facility]   2019  5:30 AM Lian Marino, PT [facility]   2019  2:50 PM [facility]   2019  3:50 PM Angelica Grady, APRN CNP [facility]       Any outstanding tests or procedures:    Procedures     Future Labs/Procedures    Oxygen - Nasal cannula     Comments:    1-2 Lpm by nasal cannula to keep O2 sats 90% or greater.          Referrals     Future Labs/Procedures    Discharge Order: F/U with Cardiac  TRISTA     Discharge Order: F/U with Cardiac  TRISTA     Occupational Therapy Adult Consult     Comments:     Evaluate and treat as clinically indicated.    Reason:  Generalized weakness/deconditioning    Physical Therapy Adult Consult     Comments:    Evaluate and treat as clinically indicated.    Reason: generalized weakness/deconditioning            Key Recommendations:  Pt was admitted for CHF and respiratory failure. Pt was to have a Watchman which was cancelled. She will have this rescheduled at a later date. She is going to the [facility] for rehab.     Chacha Posada RN    AVS/Discharge Summary is the source of truth; this is a helpful guide for improved communication of patient story

## 2019-05-03 NOTE — ED PROVIDER NOTES
History     Chief Complaint:  Generalized Weakness    HPI   Helene Gibbs is a 84 year old female with a history of atrial fibrillation on Eliquis, CHF, CKD, NSTEMI, GI bleed, diabetes, and COPD who presents with generalized weakness. The patient reported that over the past few days, she has been experiencing increasing generalized weakness and fatigue, where she has been sleeping all day. This is abnormal for her. She has additionally had increased shortness of breath, where she has been using her at-home oxygen throughout the day, rather than just at night as she typically does. With her persistent symptoms, she presented to the ED for evaluation. Here in the ED, she does note some mild leg swelling. She adds that she has a history of anemia. She denies any chest pain, cough, bloody stools, abdominal pain, nausea, or vomiting.  Of note, she was hospitalized for 7 days in February for her congestive heart failure, and has a pacemaker in place.     Allergies:  Ambien  Penicillin   Definity  Sulfa drugs  Cymbalta  Fluconazole    Medications:    Albuterol  Eliquis  Valisone  Prolia  Flonase  Glipizide  Cortaid  Vistaril  Lidoderm  Lyrica  Metoprolol  Zofran  Zantac  Simvastatin  Aldactone  Spiriva  Torsemide    Past Medical History:    Anemia  Arthritis  Bell's palsy  Breast cancer  CKD  Colon polyps  CHF  COPD  CAD  BCC  Depression  Anxiety  Diabetes  GERD  NSTEMI  HTN  Hyperlipidemia  Lumbar spinal stenosis  Nicotine dependence  Obesity  Osteoporosis  Chronic pain  Pacemaker  Atrial fibrillation  Pleural effusion  Pulmonary HTN  PVD  Rectal stenosis  Tricuspid regurgitation    Past Surgical History:    Hip arthroplasty  Shoulder arthroplasty  Bone marrow biopsy  Bone density DEXA  EGD  AV ablation  Pacemaker implant  Lower extremity I&D  Cholecystectomy  Knee surgery  Breast lumpectomy  Phacoemulsification cornea    Family History:    HTN  Diabetes  CAD    Social History:  Marital Status:   [5]  Former  Smoker  Negative for alcohol use.  Negative for drug use.     Review of Systems   Respiratory: Positive for shortness of breath. Negative for cough.    Cardiovascular: Positive for leg swelling. Negative for chest pain.   Gastrointestinal: Negative for abdominal pain, blood in stool, nausea and vomiting.   Neurological: Positive for weakness (generalized).   All other systems reviewed and are negative.      Physical Exam     Patient Vitals for the past 24 hrs:   BP Temp Temp src Pulse Resp SpO2   05/03/19 1754 -- -- -- -- -- 97 %   05/03/19 1746 -- -- -- -- -- 96 %   05/03/19 1745 131/67 -- -- 70 -- --   05/03/19 1651 -- 98.1  F (36.7  C) Temporal -- -- --   05/03/19 1647 -- -- -- 70 -- --   05/03/19 1644 128/72 -- -- -- 18 (!) 85 %     Physical Exam  Eye:  Pupils are equal, round, and reactive.  Extraocular movements intact.    ENT:  No rhinorrhea.  Moist mucus membranes.  Normal tongue and tonsil.    Cardiac:  Regular rate and rhythm.  No murmurs, gallops, or rubs.    Pulmonary:  Diminished breath sounds and rales to the bilateral lower lobes, no dyspnea, on supplemental oxygen.      Abdomen:  Positive bowel sounds.  Abdomen is soft and non-distended, without focal tenderness.    Musculoskeletal:  Normal movement of all extremities without evidence for deficit.    Skin:  Warm and dry without rashes.    Neurologic:  Non-focal exam without asymmetric weakness or numbness.     Psychiatric:  Normal affect with appropriate interaction with examiner.    Emergency Department Course   ECG:  Indication: Generalized Weakness   Time: 1712  Vent. Rate 70 bpm. WY interval *. QRS duration 154. QT/QTc 458/494. P-R-T axis * -77 107.  Ventricular-paced rhythm. Abnormal ECG.  Read time: 1717    Imaging:  Radiographic findings were communicated with the patient who voiced understanding of the findings  XR Chest 2 views:   Lungs are hypoinflated with perihilar and basilar opacities which may represent atelectasis, aspiration, or  infection. Component of edema cannot be excluded. Small bilateral pleural effusions. Cardiac silhouette is enlarged, unchanged. Dual lead cardiac pacing device is unchanged. Spinal degenerative change and vertebroplasty change noted, as per radiology.     Laboratory:  CBC: WBC: 4.9, HGB: 9.5 (L), PLT: 159  BMP: Glucose 167 (H), BUN: 40 (H), Creatinine: 1.84 (H), GFR Estimate: 25 (L), Albumin: 3.3 (L), o/w WNL    ABO/Rh: O Pos    1716 Troponin: <0.015  BNP: 9,289 (H)    Venous Blood Gas  Recent Labs   Lab 05/03/19  1732   PHV 7.37   PCO2V 41   PO2V 41   Lactic Acid 1.3   HCO3V 24     UA with Microscopic: pH: 7.5 (H), Leukocyte Esterase Urine: Trace, RBC: 8(H) Bacteria: Few, Squamous epithelial: 6 (H), Mucous Urine: Present, Hyaline: 8 (H), o/w WNL    Interventions:  1909 Lasix 20mg IV    Emergency Department Course:  Nursing notes and vitals reviewed. (1713) I performed an exam of the patient as documented above.     IV inserted. Medicine administered as documented above. Blood drawn. This was sent to the lab for further testing, results above.  The patient provided a urine sample here in the emergency department. This was sent for laboratory testing, findings above.     EKG obtained in the ED, see results above.     The patient was sent for a chest XR while in the emergency department, findings above.     (1835) I rechecked the patient and discussed the results of her workup thus far.     (1849)  I consulted with Dr. Kamara of the hospitalist services. They are in agreement to accept the patient for admission.    Findings and plan explained to the Patient who consents to admission. Discussed the patient with Dr. Kamara, who will admit the patient to a [facility] inpatient bed for further monitoring, evaluation, and treatment.    Impression & Plan      Medical Decision Making:  Helene Gibbs is a 84 year old female with a history of congestive heart failure and atrial fibrillation presenting with increasing shortness  of breath with any type of exertion along with increasing somnolence and fatigue over the last several days.  This is similar to her presentation in February when she was admitted for congestive heart failure requiring inpatient diuresis.  She does have access to at home oxygen which she typically uses at night, but she has had to use this more often during the day.  She also has a history of anemia and is concerned that she may be anemic today.    On my exam, the patient is frail-appearing rather sleepy.  However, she does awaken to verbal stimuli and is able to provide a history.  She sounds wet in her lung bases and her chest x-ray is positive for signs of fluid overload.  Her BNP is doubled.  I see no evidence of significant anemia, she has dropped her hemoglobin approximately one-point over the last couple of months.  Her kidney function has slightly worsened.    The patient is hypoxic on room air and I believe that she will require inpatient diuresis for her acute respiratory failure, likely from fluid overload.  I do not believe that this represents acute coronary syndrome, pulmonary embolism, or other more serious intrathoracic pathologies.  I provided her with gentle diuresis IV and this will need to be followed considering her renal insufficiency.  I spoke with Dr. Kamara of the hospitalist service who admit the patient under inpatient status to the [facility].    Diagnosis:    ICD-10-CM    1. Acute respiratory failure with hypoxia (H) J96.01    2. Acute on chronic systolic congestive heart failure (H) I50.23      Disposition:  Admitted to Dr. Kamara    Scribe Disclosure:  Carrie SCHULTZ, am serving as a scribe on 5/3/2019 at 5:13 PM to personally document services performed by Trierweiler, Chad A, MD based on my observations and the provider's statements to me.     Carrie Sanders  5/3/2019    EMERGENCY DEPARTMENT       Trierweiler, Chad A, MD  05/04/19 [id number]

## 2019-05-03 NOTE — H&P
[facility]    History and Physical  Hospitalist       Date of Admission:  5/3/2019  Date of Service (when I saw the patient): 05/03/19    Assessment & Plan   Helene Gibbs is a 84 year old female who presents with generalized weakness and shortness of breath.  Admitted for further evaluation and treatment.    Acute hypoxic respiratory failure, acute diastolic CHF exacerbation, chronic atrial fibrillation status post ablation and pacemaker placement, hypertension, hyperlipidemia: Patient states that she has had generalized weakness over the past few days including fatigue with sleeping all day.  Patient does endorse increased shortness of breath using home oxygen throughout the day rather than just at night, patient states that she came to the emergency department with leg swelling as well, she denies chest pain, cough with production,  blood in the stool or urine, nausea, vomiting, abdominal pain, rash, fever, headache, sore throat.  The patient does endorse shortness of breath, weakness, lower extremity edema.  Basic natruretic peptide is 9289 on admission with a troponin of less than 0.015.  Lactic acid is 1.3.  Venous blood gas shows 7.3 7/41/41/24.  Chest x-ray shows lungs are hypoinflated with perihilar and basilar opacities which may represent atelectasis, aspiration, or infection, component of edema cannot be excluded, small bilateral pleural effusions, see report for further details.  Patient with echocardiogram completed in February 2019 showing moderately severe tricuspid regurgitation, severe pulmonary hypertension, and ejection fraction of 50 to 55% with severe biatrial enlargement.  -Cardiology consulted.   -IV diuresis with 60 mg IV Lasix 3 times a day.  -Pulmonary hygiene.  -Close hemodynamic monitoring.    -Continue prior to admission metoprolol.  -Continue prior to admission simvastatin.  -Hold prior to admission Eliquis.  -Continue prior to admission Spironolactone.  -Hold prior  to admission torsemide.  -Per report, patient states that she is supposed to have a watchman procedure on Monday. Will hold eliquis at this time.     Acute kidney injury, stage II-III:Patient with a creatinine of 1.84 on admission.  Urinalysis completed with pH of 7.5, trace leukocyte esterase, 8 red blood cells, few bacteria, and 6 squamous epithelial cells.  -Avoid nephrotoxins.  -Consider     Anemia, with history of colonic angioid ectasias: Patient with a hemoglobin of 9.5 on admission.  Denies external blood loss.  -Monitor.    Diabetes: Patient maintained on glipizide.  -Hold prior to admission glipizide.  -Insulin sliding scale.    History of Bell's palsy: Stable.  -Continue prior to admission Lyrica.    COPD: Stable.  -Continue prior to admission inhalers.    GERD: Stable.  -Continue prior to admission ranitidine.    DVT Prophylaxis: Eliquis  Code Status: DNR / DNI    Disposition: Inpatient..    Dr. Jason Kamara D.O.  [facility] Hospitalist  Pager [phone]    Primary Care Physician   Neisha Esparza    Chief Complaint   Shortness of breath.    History is obtained from the patient and medical records.     History of Present Illness   Helene Gibbs is a 84 year old female who presents with generalized weakness and shortness of breath.  Admitted for further evaluation and treatment. Patient states that she has had generalized weakness over the past few days including fatigue with sleeping all day.  Patient does endorse increased shortness of breath using home oxygen throughout the day rather than just at night, patient states that she came to the emergency department with leg swelling as well, she denies chest pain, cough with production,  blood in the stool or urine, nausea, vomiting, abdominal pain, rash, fever, headache, sore throat.  The patient does endorse shortness of breath, weakness, lower extremity edema.  Basic natruretic peptide is 9289 on admission with a troponin of less than  0.015.  Lactic acid is 1.3.  Venous blood gas shows 7.3 7/41/41/24.  Chest x-ray shows lungs are hypoinflated with perihilar and basilar opacities which may represent atelectasis, aspiration, or infection, component of edema cannot be excluded, small bilateral pleural effusions, see report for further details.  Patient with echocardiogram completed in February 2019 showing moderately severe tricuspid regurgitation, severe pulmonary hypertension, and ejection fraction of 50 to 55% with severe biatrial enlargement.    Past Medical History    I have reviewed this patient's medical history and updated it with pertinent information if needed.   Past Medical History:   Diagnosis Date     Anemia      Ankle arthritis      Arthritis      Back pain      Bell's palsy 9/08    left     Breast CA (H) 2004-    left lumpectomy, radiation, -recurrence     Breast cancer (H) 2004    Left breast lumpectomy w LN disection, and radiation therapy     CKD (chronic kidney disease)     stage 3 baseline Cr 1.3     Colon polyps     colonoscopy-9/12-next due in 9/13     Congestive heart failure (H)      COPD (chronic obstructive pulmonary disease) (H)      Coronary artery disease      DM (diabetes mellitus) (H)      GERD (gastroesophageal reflux disease)      Hyperlipidemia      Hypertension      Lumbar spinal stenosis     use cane     Nicotine dependence      Obesity      Osteoporosis      Other chronic pain      Pacemaker      Permanent atrial fibrillation (H) 8/2008    S/P AV node ablation and pacemaker 10-25-12       Pleural effusion      Pulmonary hypertension (H)      PVD (peripheral vascular disease) (H)      Rectal stenosis      Tobacco abuse     current     Tricuspid regurgitation        Past Surgical History   I have reviewed this patient's surgical history and updated it with pertinent information if needed.  Past Surgical History:   Procedure Laterality Date     ARTHROPLASTY HIP Left 10/20/2018    Procedure: LEFT HIP  HERACLIO-ARTHROPLASTY ;  Surgeon: Ish Fish MD;  Location:  OR     ARTHROSCOPY SHOULDER RT/LT  1999, 2004    Bilateral, right then left     BONE MARROW BIOPSY, BONE SPECIMEN, NEEDLE/TROCAR N/A 8/29/2017    Procedure: BIOPSY BONE MARROW;  BONE MARROW BIOPSY;  Surgeon: Marino Cohen MD;  Location:  GI     C DEXA, BONE DENSITY, AXIAL SKEL       C MAMMOGRAM, SCREENING  1/2009     COLONOSCOPY N/A 10/7/2016    Procedure: COMBINED COLONOSCOPY, SINGLE OR MULTIPLE BIOPSY/POLYPECTOMY BY BIOPSY;  Surgeon: Cassandra Mccord MD;  Location:  GI     COLONOSCOPY N/A 2/15/2019    Procedure: COLONOSCOPY;  Surgeon: Juan Pablo Hayden DO;  Location:  GI     ESOPHAGOSCOPY, GASTROSCOPY, DUODENOSCOPY (EGD), COMBINED N/A 8/10/2017    Procedure: COMBINED ESOPHAGOSCOPY, GASTROSCOPY, DUODENOSCOPY (EGD), BIOPSY SINGLE OR MULTIPLE;  gastroscopy;  Surgeon: Helene Bustamante MD;  Location:  GI     ESOPHAGOSCOPY, GASTROSCOPY, DUODENOSCOPY (EGD), COMBINED N/A 2/15/2019    Procedure: COMBINED ESOPHAGOSCOPY, GASTROSCOPY, DUODENOSCOPY (EGD);  Surgeon: Juan Pablo Hayden DO;  Location:  GI     H ABLATION AV NODE  10/2012     HC COLONOSCOPY THRU STOMA, DIAGNOSTIC  ? 2005     IMPLANT PACEMAKER  10/2012    [place] [id number], [id number]     IRRIGATION AND DEBRIDEMENT LOWER EXTREMITY, COMBINED Left 12/21/2018    Procedure: INCISION AND DRAINAGE OF LEFT HIP WITH EXCISION OF BONE FRAGMENT;  Surgeon: Ish Fish MD;  Location:  OR     JOINT REPLACEMTN, KNEE RT/LT      Joint Replacement knee bilateral     LAPAROSCOPIC CHOLECYSTECTOMY WITH CHOLANGIOGRAMS N/A 12/14/2015    Procedure: LAPAROSCOPIC CHOLECYSTECTOMY WITH CHOLANGIOGRAMS;  Surgeon: Ervin Amos MD;  Location:  OR     LUMPECTOMY BREAST      Left-2004     PHACOEMULSIFICATION CLEAR CORNEA WITH STANDARD INTRAOCULAR LENS IMPLANT Left 7/16/2015    Procedure: PHACOEMULSIFICATION CLEAR CORNEA WITH STANDARD INTRAOCULAR LENS IMPLANT;  Surgeon: Sai Obregon MD;   Location: [facility]     PHACOEMULSIFICATION CLEAR CORNEA WITH TORIC INTRAOCULAR LENS IMPLANT Right 5/9/2017    Procedure: PHACOEMULSIFICATION CLEAR CORNEA WITH TORIC INTRAOCULAR LENS IMPLANT;  RIGHT EYE PHACOEMULSIFICATION CLEAR CORNEA WITH TORIC INTRAOCULAR LENS IMPLANT ;  Surgeon: Duc Richmond MD;  Location: [facility]       Prior to Admission Medications   Prior to Admission Medications   Prescriptions Last Dose Informant Patient Reported? Taking?   ACCU-CHEK SMARTVIEW test strip  Self No No   Sig: USE 1 STRIP BY IN VITRO ROUTE 2 TIMES DAILY OR AS DIRECTED   ACE/ARB/ARNI NOT PRESCRIBED (INTENTIONAL)   Yes No   Sig: Please choose reason not prescribed, below   ASPIRIN NOT PRESCRIBED (INTENTIONAL)   No No   Sig: Please choose reason not prescribed, below   LYRICA 75 MG capsule   No No   Sig: TAKE 1 CAPSULE BY MOUTH TWICE A DAY   PROCTOSOL HC 2.5 % cream   No No   Sig: PLACE RECTALLY 2 TIMES DAILY AS NEEDED FOR HEMORRHOIDS   albuterol (PROAIR HFA, PROVENTIL HFA, VENTOLIN HFA) 108 (90 BASE) MCG/ACT inhaler  Self No No   Sig: Inhale 2 puffs into the lungs every 4 hours as needed for shortness of breath / dyspnea or wheezing   apixaban ANTICOAGULANT (ELIQUIS) 2.5 MG tablet   No No   Sig: Take 1 tablet (2.5 mg) by mouth 2 times daily   betamethasone valerate (VALISONE) 0.1 % external cream   No No   Sig: APPLY TOPICALLY 2 TIMES DAILY USE SPARINGLY   blood glucose monitoring (NO BRAND SPECIFIED) meter device kit  Self No No   Sig: Accucheck Mariza meter; verified with pharmacist   denosumab (PROLIA) 60 MG/ML SOLN injection  Self No No   Sig: Inject 1 mL (60 mg) Subcutaneous every 6 months   fluticasone (FLONASE) 50 MCG/ACT nasal spray   No No   Sig: Spray 2 sprays into both nostrils daily   glipiZIDE (GLUCOTROL) 5 MG tablet   No No   Sig: Take 1 tablet (5 mg) by mouth every morning (before breakfast)   hydrOXYzine (VISTARIL) 25 MG capsule   No No   Sig: Take 1 capsule (25 mg) by mouth 2 times daily as needed for itching    hydrocortisone (CORTAID) 1 % external cream  Self Yes No   Sig: Apply topically 3 times daily as needed for itching   hypromellose (ARTIFICIAL TEARS) 0.5 % SOLN ophthalmic solution  Self Yes No   Sig: Place 1 drop into both eyes every hour as needed for dry eyes   lidocaine (LIDODERM) 5 % patch   Yes No   metoprolol tartrate (LOPRESSOR) 25 MG tablet   No No   Sig: TAKE 1 TABLET BY MOUTH TWICE A DAY   ondansetron (ZOFRAN ODT) 4 MG ODT tab  Self No No   Sig: Take 1 tablet (4 mg) by mouth every 6 hours as needed for nausea   Patient not taking: Reported on 5/1/2019   ranitidine (ZANTAC) 150 MG tablet   No No   Sig: Take 1 tablet (150 mg) by mouth At Bedtime   simvastatin (ZOCOR) 10 MG tablet  Self No No   Sig: Take 1 tablet (10 mg) by mouth At Bedtime   spironolactone (ALDACTONE) 25 MG tablet   No No   Sig: Take 1-2 tablets (25-50 mg) by mouth daily   tiotropium (SPIRIVA) 18 MCG inhaled capsule   No No   Sig: Inhale 1 capsule (18 mcg) into the lungs daily   torsemide (DEMADEX) 20 MG tablet  Self No No   Sig: TAKE 1 TABLET BY MOUTH EVERY DAY      Facility-Administered Medications: None     Allergies   Allergies   Allergen Reactions     Ambien [Zolpidem Tartrate] Visual Disturbance     Hallucinations and fell out of bed at night.     Penicillins Hives     Definity      Caused a syncopal episode.     Sulfa Drugs Itching     Cymbalta Rash     Fluconazole Rash     Tolerates miconazole suppositories       Social History   I have reviewed this patient's social history and updated it with pertinent information if needed. Helene Gibbs  reports that she quit smoking about 11 months ago. Her smoking use included cigarettes. She has a 11.25 pack-year smoking history. She has never used smokeless tobacco. She reports that she does not drink alcohol or use drugs.    Family History   I have reviewed this patient's family history and updated it with pertinent information if needed.   Family History   Problem Relation Age of  Onset     Hypertension Mother      Diabetes Mother          at 83 yrs     C.A.D. Father          age 70s     Breast Cancer No family hx of      Colon Cancer No family hx of        Review of Systems   The 10 point Review of Systems is negative other than noted in the HPI or here.     Physical Exam   Temp: 98.1  F (36.7  C) Temp src: Temporal BP: 131/67 Pulse: 70   Resp: 18 SpO2: 96 %      Vital Signs with Ranges  Temp:  [98.1  F (36.7  C)] 98.1  F (36.7  C)  Pulse:  [70] 70  Resp:  [18] 18  BP: (128-131)/(67-72) 131/67  SpO2:  [85 %-96 %] 96 %  0 lbs 0 oz    GENERAL: Alert. NAD. Conversational, appropriate.   HEENT: Normocephalic. EOMI. No icterus or injection. Nares normal.   LUNGS: Crackles bilaterally with decrement to bases. No dyspnea at rest.  Nasal cannula in place.  HEART: Regular rate. Extremities perfused.   ABDOMEN: Soft, nontender, and nondistended. Positive bowel sounds.   EXTREMITIES: LE edema noted.   NEUROLOGIC: Moves extremities x4.  Follows commands.    Data   Data reviewed today:  I personally reviewed both laboratory and imaging data.   Recent Labs   Lab 19  1716   WBC 4.9   HGB 9.5*   MCV 85         POTASSIUM 4.5   CHLORIDE 102   CO2 33*   BUN 40*   CR 1.84*   ANIONGAP 6   GERARD 9.5   *   ALBUMIN 3.3*   PROTTOTAL 7.4   BILITOTAL 0.6   ALKPHOS 73   ALT 14   AST 10   TROPI <0.015       Recent Results (from the past 24 hour(s))   XR Chest 2 Views    Narrative    CHEST TWO VIEWS   5/3/2019 5:46 PM     HISTORY: Shortness of breath.    COMPARISON: 3/1/2019      Impression    IMPRESSION: Lungs are hypoinflated with perihilar and basilar  opacities which may represent atelectasis, aspiration, or infection.  Component of edema cannot be excluded. Small bilateral pleural  effusions. Cardiac silhouette is enlarged, unchanged. Dual lead  cardiac pacing device is unchanged. Spinal degenerative change and  vertebroplasty change noted.    WILL FLORES MD

## 2019-05-03 NOTE — TELEPHONE ENCOUNTER
"TO PCP (advised ER, pt refuses)   Pt transferred to triage    Hgb - has been an ongoing issue for past few months     Very shaky, can't stay awake     \"it's time to have it checked\" - \"ongoing leakage in colon\" (bleeding)     Dizzy/lightheaded? No but very fatigued \"just don't feel good\"     She feels like something is \"not right\"     Was going to \"try to ride it out by next week\" but \"I can't wait any longer, I need to have my labs checked\"     Affecting eye site - difficult to see (not blurry, could not describe further)     Advised she go to ER for her symptoms. Pt states \"I am not going to the ER. I need to have my labs checked and then if there is an issue I will go and wait in the ER\" Pt scheduled a lab visit for this afternoon as orders are in the chart - again advised ER. Pt refuses.     Shawanda REID RN      "

## 2019-05-04 NOTE — PLAN OF CARE
Discharge Planner PT   Patient plan for discharge: Home  Current status: Pt is an 84 year old female admitted with SOB, plan for watchman procedure next week. At baseline, pt lives in an [place], receives assist 2x daily for donning/doffing compression socks, weekly assist with bathing, breakfast delivered to room and walks to dining room for dinner. Pt uses a 4ww for all ambulation.  This date, pt requires min assist for bed mobility, good sitting balance at EOB. Pt performs sit to/from stand and stand pivot transfer with 4ww and SBA. Pt ambulates 100' with 4ww and SBA. Recommend pt ambulate with RN staff 3-4x/day.  Barriers to return to prior living situation: Decreased activity tolerance  Recommendations for discharge: shelter with home health PT  Rationale for recommendations: Anticipate with further medical management and therapy, patient will be safe to discharge home with preexisting assist. Pt will benefit from HHPT as she is currently below baseline mobility.       Entered by: Albina Tran 05/04/2019 10:38 AM

## 2019-05-04 NOTE — PLAN OF CARE
A&Ox4. Anxious at times, difficulty sleeping. VSS on 2-3LPM overnight, wean as tolerated. Tele 100%vpaced. +2 edema, javi/blotchy BLE, reports some baseline numbness/tingling with edema. Diuresing with IV lasix. Pain in neck/back managed with PRN tylenol, hot packs, PRN tramadol. Eliquis on hold for possible watchman procedure Monday. Mepilex on coccyx, up with 1/W. Plan for renal US.

## 2019-05-04 NOTE — PROVIDER NOTIFICATION
Brief update:    Paged re: pt requesting home tramadol    Have ordered at 50mg q6h PRN    Imer Reyes MD  2:47 AM

## 2019-05-04 NOTE — PROGRESS NOTES
RECEIVING UNIT ED HANDOFF REVIEW    ED Nurse Handoff Report was reviewed by: JIHAN MAURER on May 3, 2019 at 7:51 PM

## 2019-05-04 NOTE — PROVIDER NOTIFICATION
MD Notification    Notified Person: MD    Notified Person Name: Reyes    Notification Date/Time: 5/4/19 0238    Notification Interaction: phone page    Purpose of Notification: pt requesting tramadol, states takes at home for chronic neck/back pain, now rating as severe without relief from tylenol/hot packs    Orders Received: tramadol ordered

## 2019-05-04 NOTE — PROGRESS NOTES
[facility]    Medicine Progress Note - Hospitalist Service       Date of Admission:  5/3/2019  Date of Service: 05/04/2019    Assessment & Plan      Helene Gibbs is a 84 year old female who presents with generalized weakness and shortness of breath.  Admitted for further evaluation and treatment.    Acute diastolic CHF exacerbation  Chronic atrial fibrillation status post ablation and pacemaker placement, Hypertension  Hyperlipidemia:   Assessment: Presents with generalized weakness over the past few days including fatigue with sleeping all day.  Patient does endorse increased shortness of breath using home oxygen throughout the day rather than just at night. Basic natruretic peptide is 9289 on admission with a troponin of less than 0.015. Patient with echocardiogram completed in February 2019 showing moderately severe tricuspid regurgitation, severe pulmonary hypertension, and ejection fraction of 50 to 55% with severe biatrial enlargement.  Plan:  -Cardiology consulted.   -Continue IV diuresis with 60 mg IV Lasix 3 times a day.  -Follow Cr/electrolytes while being diuresed  -Continue prior to admission metoprolol/simvastatin/Spironolactone.  -Hold prior to admission torsemide while being IV diuresed  -Per report, patient states that she is supposed to have a watchman procedure on Monday. Will hold eliquis at this time.     Acute kidney injury, stage II-III:Patient with a creatinine of 1.84 on admission.  Urinalysis completed with pH of 7.5, trace leukocyte esterase, 8 red blood cells, few bacteria, and 6 squamous epithelial cells.  -Avoid nephrotoxins.  -Consider     Anemia, with history of colonic angioid ectasias: Patient with a hemoglobin of 9.5 on admission.  Denies external blood loss.  -Monitor.    Diabetes: Patient maintained on glipizide.  -Hold prior to admission glipizide.  -Insulin sliding scale.    History of Bell's palsy: Stable.  -Continue prior to admission Lyrica.    COPD:  Stable.  -Continue prior to admission inhalers.    GERD: Stable.  -Continue prior to admission ranitidine.    Cuong Yadav MD   [facility] Hospitalist       Diet: Combination Diet Low Saturated Fat Na <2400mg Diet, No Caffeine Diet    DVT Prophylaxis: Ambulate every shift  Montano Catheter: not present  Code Status: DNR/DNI      Disposition Plan   Expected discharge: 1-2 days, recommended to prior living arrangement once Cardiology eval completed, adequate diuresis.  Entered: Cuong Yadav MD 05/04/2019, 9:47 AM       The patient's care was discussed with the Bedside Nurse and Patient.    Cuong Yadav MD  Hospitalist Service  [facility]    ______________________________________________________________________    Interval History     No acute events overnight  No SOB or CP this AM  No fever, cough, no nausea/vomiting, abdominal pain  Wants stool softener  Would like to not have watchman procedure on Monday delayed  No new complaints at this time.    Data reviewed today: I reviewed all medications, new labs and imaging results over the last 24 hours. I personally reviewed no images or EKG's today.    Physical Exam   Vital Signs: Temp: 97.6  F (36.4  C) Temp src: Oral BP: 107/63 Pulse: 70   Resp: 16 SpO2: 94 % O2 Device: Nasal cannula Oxygen Delivery: 2 LPM  Weight: 164 lbs 0 oz    GENERAL: Alert. NAD. Conversational, appropriate.   HEENT: Normocephalic. EOMI. No icterus or injection. Nares normal.   LUNGS: Trace bibasilar crackles bilaterally. No dyspnea.  HEART: Regular rate and rhythm. Extremities perfused.   ABDOMEN: Soft, nontender, and nondistended. Positive bowel sounds.   EXTREMITIES: LE edema noted.   NEUROLOGIC: Moves extremities x4.  Follows commands.    Data   Recent Labs   Lab 05/04/19  0615 05/03/19  1716   WBC 4.4 4.9   HGB 9.3* 9.5*   MCV 85 85    159    141   POTASSIUM 4.0 4.5   CHLORIDE 102 102   CO2 33* 33*   BUN 40* 40*   CR 1.75* 1.84*   ANIONGAP 5 6   GERARD 9.5 9.5    GLC 65* 167*   ALBUMIN  --  3.3*   PROTTOTAL  --  7.4   BILITOTAL  --  0.6   ALKPHOS  --  73   ALT  --  14   AST  --  10   TROPI  --  <0.015     Recent Results (from the past 24 hour(s))   XR Chest 2 Views    Narrative    CHEST TWO VIEWS   5/3/2019 5:46 PM     HISTORY: Shortness of breath.    COMPARISON: 3/1/2019      Impression    IMPRESSION: Lungs are hypoinflated with perihilar and basilar  opacities which may represent atelectasis, aspiration, or infection.  Component of edema cannot be excluded. Small bilateral pleural  effusions. Cardiac silhouette is enlarged, unchanged. Dual lead  cardiac pacing device is unchanged. Spinal degenerative change and  vertebroplasty change noted.    WILL FLORES MD   US Renal Complete    Narrative    ULTRASOUND RETROPERITONEAL COMPLETE  5/4/2019 9:13 AM     HISTORY: Acute kidney injury.    COMPARISON: December 3, 2015    FINDINGS: The bilateral renal parenchyma are unremarkable in  echogenicity without evidence for shadowing stone or mass. Simple  cysts in both kidneys measuring up to 2.3 cm on the right and 3.1 cm  on the left. No hydronephrosis. Right kidney measures 10.4 x 4.5 x 3.9  cm and the left measures 11.7 x 4.4 x 4.3 cm. Cortical thickness is  1.1 cm on the right and 1.2 cm on the left.  Bladder is unremarkable  given its level of distention.      Impression    IMPRESSION: No obstruction demonstrated.     Medications     - MEDICATION INSTRUCTIONS -         fluticasone  2 spray Both Nostrils QPM     furosemide  60 mg Intravenous TID     metoprolol tartrate  25 mg Oral BID     pregabalin  75 mg Oral BID     ranitidine  150 mg Oral At Bedtime     simvastatin  10 mg Oral At Bedtime     sodium chloride (PF)  3 mL Intracatheter Q8H     spironolactone  25-50 mg Oral Daily     umeclidinium  1 puff Inhalation Daily

## 2019-05-04 NOTE — PROGRESS NOTES
05/04/19 1000   Quick Adds   Type of Visit Initial PT Evaluation   Living Environment   Lives With facility resident   Living Arrangements assisted living   Home Accessibility no concerns   Transportation Anticipated family or friend will provide   Self-Care   Usual Activity Tolerance good   Current Activity Tolerance good   Regular Exercise No   Equipment Currently Used at Home walker, rolling   Functional Level Prior   Ambulation 1-->assistive equipment   Transferring 1-->assistive equipment   Fall history within last six months no   Which of the above functional risks had a recent onset or change? ambulation;transferring   Prior Functional Level Comment Pt reports that she has an aide BID for compression socks, bath aid once a week. Pt reports independence with all other dressing. Has breakfast delivered in AM and walks to dining room in PM with 4ww.   General Information   Onset of Illness/Injury or Date of Surgery - Date 05/03/19   Referring Physician Dr. Kamara   Patient/Family Goals Statement To go home   Pertinent History of Current Problem (include personal factors and/or comorbidities that impact the POC) Pt is an 84 year old female admitted with SOB.   Precautions/Limitations fall precautions   Cognitive Status Examination   Orientation orientation to person, place and time   Level of Consciousness alert   Follows Commands and Answers Questions 100% of the time   Pain Assessment   Patient Currently in Pain No   Range of Motion (ROM)   ROM Quick Adds No deficits were identified   Strength   Strength Comments Gross LE strength 4/5   Bed Mobility   Bed Mobility Comments Supine to sit min assist   Transfer Skills   Transfer Comments Sit to/from stand SBA   Gait   Gait Comments 4ww, decreased mariama and forward bent posture   Balance   Balance Comments Good in sitting, fair in standing   General Therapy Interventions   Planned Therapy Interventions balance training;bed mobility training;gait  "training;strengthening;transfer training;home program guidelines;progressive activity/exercise   Clinical Impression   Criteria for Skilled Therapeutic Intervention yes, treatment indicated   PT Diagnosis Impaired ambulation   Influenced by the following impairments Decreased strength, decreased endurance, decreased balance   Functional limitations due to impairments Difficulty with bed mobility, transfers, ambulation   Clinical Presentation Stable/Uncomplicated   Clinical Presentation Rationale VSS, pain controlled   Clinical Decision Making (Complexity) Low complexity   Therapy Frequency` 5 times/week   Predicted Duration of Therapy Intervention (days/wks) 1 week   Anticipated Equipment Needs at Discharge walker   Anticipated Discharge Disposition Home with Assist;Home with Home Therapy   Risk & Benefits of therapy have been explained Yes   Patient, Family & other staff in agreement with plan of care Yes   [facility] [employer] TM \"6 Clicks\"   2016, Trustees of [facility], under license to [employer].  All rights reserved.   6 Clicks Short Forms Basic Mobility Inpatient Short Form   [facility] [employer]  \"6 Clicks\" V.2 Basic Mobility Inpatient Short Form   1. Turning from your back to your side while in a flat bed without using bedrails? 3 - A Little   2. Moving from lying on your back to sitting on the side of a flat bed without using bedrails? 3 - A Little   3. Moving to and from a bed to a chair (including a wheelchair)? 3 - A Little   4. Standing up from a chair using your arms (e.g., wheelchair, or bedside chair)? 3 - A Little   5. To walk in hospital room? 3 - A Little   6. Climbing 3-5 steps with a railing? 3 - A Little   Basic Mobility Raw Score (Score out of 24.Lower scores equate to lower levels of function) 18   Total Evaluation Time   Total Evaluation Time (Minutes) 10     "

## 2019-05-04 NOTE — PLAN OF CARE
"New admit. Pt alert, oriented and able to initiate needs.  Denies pain but does have a \"bad back.\"  Vitals remain stable.  Pt on 3L/NC with SPO2 in mid to high 90's.  Does have dyspnea on exertion.   Pt does have purse/wallet at bedside and declines to have them sent to security. Pt transfers with assist of one and personal walker. Holding eliquis as pt was to have a watchman procedure on Monday.  Cardiology consult and ultrasound ordered for tomorrow.  "

## 2019-05-04 NOTE — PLAN OF CARE
Plan: continue diuresis. 5/6 watchman procedure planned    Neuro: A/Ox4    Cardiac: V paced 100% with PPM. Hx of afib - currently holding eliquis    Respiratory: RA-2L Nc. Has intermittent oxygen at home    Drains/Tubes: PIV    GI/: constipated - given dulcolax tabs and milk of mag     Activity: stand by with 4 wheel walker    Discharge: home with home health and PT

## 2019-05-04 NOTE — PHARMACY-ADMISSION MEDICATION HISTORY
Admission medication history interview status for the 5/3/2019  admission is complete. See EPIC admission navigator for prior to admission medications     Medication history source reliability:Good    Actions taken by pharmacist (provider contacted, etc): Chart Review and discussed with patient.     Additional medication history information not noted on PTA med list :    - Patient has Watchman procedure planned for 5/7 with ultrasound scheduled for 5/6. Instructed to hold Eliquis 2 days prior to procedure (thinks she was supposed to start holding 5/4). Has taken one dose this morning.    Medication reconciliation/reorder completed by provider prior to medication history? Yes    Time spent in this activity: 15 minutes    Prior to Admission medications    Medication Sig Last Dose Taking? Auth Provider   albuterol (PROAIR HFA, PROVENTIL HFA, VENTOLIN HFA) 108 (90 BASE) MCG/ACT inhaler Inhale 2 puffs into the lungs every 4 hours as needed for shortness of breath / dyspnea or wheezing prn Yes Neisha Esparza MD   apixaban ANTICOAGULANT (ELIQUIS) 2.5 MG tablet Take 1 tablet (2.5 mg) by mouth 2 times daily 5/3/2019 at am Yes Angelica Grady APRN CNP   betamethasone valerate (VALISONE) 0.1 % external cream APPLY TOPICALLY 2 TIMES DAILY USE SPARINGLY  Patient taking differently: APPLY TOPICALLY TO RASH UNDER BREAST DAILY USE SPARINGLY 5/2/2019 at Unknown time Yes Neisha Esparza MD   bisacodyl (DULCOLAX) 5 MG EC tablet Take 10 mg by mouth At Bedtime 5/2/2019 at pm Yes Unknown, Entered By History   denosumab (PROLIA) 60 MG/ML SOLN injection Inject 1 mL (60 mg) Subcutaneous every 6 months Past Month at Unknown time Yes Hector Moran MD   fluticasone (FLONASE) 50 MCG/ACT nasal spray Spray 2 sprays into both nostrils daily  Patient taking differently: Spray 2 sprays into both nostrils every evening  5/2/2019 at pm Yes Neisha Esparaz MD   glipiZIDE (GLUCOTROL) 5 MG tablet Take 1 tablet (5 mg) by mouth every morning  (before breakfast) 5/3/2019 at am Yes Neisha Esparza MD   hydrOXYzine (VISTARIL) 25 MG capsule Take 1 capsule (25 mg) by mouth 2 times daily as needed for itching 5/2/2019 at Unknown time Yes Neisha Esparza MD   hypromellose (ARTIFICIAL TEARS) 0.5 % SOLN ophthalmic solution Place 1 drop into both eyes every hour as needed for dry eyes prn Yes Unknown, Entered By History   lidocaine (LIDODERM) 5 % patch Place 1 patch onto the skin daily as needed back Past Week at Unknown time Yes Reported, Patient   LYRICA 75 MG capsule TAKE 1 CAPSULE BY MOUTH TWICE A DAY 5/3/2019 at am Yes Neisha Esparza MD   metoprolol tartrate (LOPRESSOR) 25 MG tablet TAKE 1 TABLET BY MOUTH TWICE A DAY 5/3/2019 at am Yes Neisha Esparza MD   PROCTOSOL HC 2.5 % cream PLACE RECTALLY 2 TIMES DAILY AS NEEDED FOR HEMORRHOIDS prn Yes Neisha Esparza MD   ranitidine (ZANTAC) 150 MG tablet Take 1 tablet (150 mg) by mouth At Bedtime  Patient taking differently: Take 150 mg by mouth daily  5/2/2019 at Unknown time Yes Neisha Esparza MD   simvastatin (ZOCOR) 10 MG tablet Take 1 tablet (10 mg) by mouth At Bedtime 5/2/2019 at pm Yes Neisha Esparza MD   spironolactone (ALDACTONE) 25 MG tablet Take 1-2 tablets (25-50 mg) by mouth daily  Patient taking differently: Take 25 mg by mouth daily  5/3/2019 at am Yes Neisha Esparza MD   tiotropium (SPIRIVA) 18 MCG inhaled capsule Inhale 1 capsule (18 mcg) into the lungs daily 5/2/2019 at am Yes Neisha Esparza MD   torsemide (DEMADEX) 20 MG tablet TAKE 1 TABLET BY MOUTH EVERY DAY 5/3/2019 at am Yes Neisha Esparza MD   traMADol (ULTRAM) 50 MG tablet Take 50 mg by mouth every 6 hours as needed for severe pain prn Yes Unknown, Entered By History   ACCU-CHEK SMARTVIEW test strip USE 1 STRIP BY IN VITRO ROUTE 2 TIMES DAILY OR AS DIRECTED   Neisha Esparza MD   ACE/ARB/ARNI NOT PRESCRIBED (INTENTIONAL) Please choose reason not prescribed, below   Neisha Esparza MD   ASPIRIN NOT PRESCRIBED  (INTENTIONAL) Please choose reason not prescribed, below   Neisha Esparza MD   blood glucose monitoring (NO BRAND SPECIFIED) meter device kit Accucheck Mariza meter; verified with pharmacist   Neisha Esparza MD

## 2019-05-04 NOTE — CONSULTS
Cardiology Consultation      Helene Gibbs MRN# [id number]   YOB: 1935 Age: 84 year old   Date of Admission: 5/3/2019     Reason for consult: HFpEF, right heart failure, mod-severe TR, AF           Assessment and Plan:     1. HFpEF  2. Moderate to severe TR and RV dilatation/dysfunction with severe pulmonary HTN  3. Chronic AF s/p AVN ablation/PPM implantation  4. Anemia secondary to colonic ectasias - plan for Watchman next week  5. RICK  6. DM    PLAN  - Agree with diuretic therapy, will decrease lasix to 40 MG IV TID  -We discussed the fact that some of her symptoms are related to her moderate to severe tricuspid regurgitation and secondary RV dysfunction/pulmonary hypertension.  We discussed the fact that therapeutic options for tricuspid regurgitation are limited and the patient is not interested in any interventional/invasive therapies for this issue.  -Assuming she responds well to diuretic therapy we will plan on having her being evaluated by electrophysiology as an inpatient to confirm the plans for watchman device implantation next week.             Chief Complaint:   Generalized Weakness           History of Present Illness:     This patient is a 84 year old female who is followed by our electrophysiology team, specifically Dr. Juarez.  She has a history of atrial fibrillation with rapid ventricular response and is status post AV alexandra ablation and pacemaker implantation in 2012.    She also has a history of moderate to severe tricuspid regurgitation that dates back to at least 2015.  Most recently an echocardiogram in February of this year demonstrated moderate right ventricular dilatation and systolic dysfunction as well as moderate to severe tricuspid regurgitation and severe pulmonary hypertension.  She has, however, remained minimally symptomatic as far as right-sided heart failure symptoms were concerned until recently.  She has been undergoing an evaluation for the watchman  procedure due to a history of GI bleeding and secondary anemia.  She is actually scheduled for the procedure this coming Tuesday.  She was admitted to [facility] yesterday with complaints of generalized weakness and shortness of breath.  She denies any chest discomfort or palpitations.  Her evaluation here was consistent with possible diastolic heart failure and she was started on aggressive diuretic therapy with 60 mg of IV Lasix 3 times daily.  This morning she feels that her symptoms have improved slightly.             Physical Exam:   Vitals were reviewed  Blood pressure 112/65, pulse 77, temperature 97.8  F (36.6  C), temperature source Oral, resp. rate 16, weight 74.4 kg (164 lb), SpO2 90 %, not currently breastfeeding.  Temperatures:  Current - Temp: 97.8  F (36.6  C); Max - Temp  Av  F (36.7  C)  Min: 97.6  F (36.4  C)  Max: 98.3  F (36.8  C)  Respiration range: Resp  Av.1  Min: 16  Max: 20  Pulse range: Pulse  Av.8  Min: 70  Max: 77  Blood pressure range: Systolic (24hrs), Av , Min:93 , Max:134   ; Diastolic (24hrs), Av, Min:59, Max:85    Pulse oximetry range: SpO2  Av.6 %  Min: 85 %  Max: 97 %    Intake/Output Summary (Last 24 hours) at 2019 1407  Last data filed at 2019 1141  Gross per 24 hour   Intake 480 ml   Output 950 ml   Net -470 ml     Constitutional:   awake, alert, cooperative, no apparent distress, and appears stated age     Eyes:   Lids and lashes normal, pupils equal, round and reactive to light, extra ocular muscles intact, sclera clear, conjunctiva normal     Neck:   supple, symmetrical, trachea midline, no JVD     Back:   symmetric     Lungs:   No increased work of breathing, good air exchange, clear to auscultation bilaterally, no crackles or wheezing  crackles bases     Cardiovascular:   Normal apical impulse, regular rate and rhythm, normal S1 and S2, no S3 or S4, 3/6 late systolic murmur at LLSB     Abdomen:   Mildly distended, non-tender      Musculoskeletal:   motor strength is 5 out of 5 all extremities bilaterally     Neurologic:   Grossly nonfocal     Skin:   no bruising or bleeding     Additional findings:   Edema 1+                 Past Medical History:   I have reviewed this patient's past medical history  Past Medical History:   Diagnosis Date     Anemia      Ankle arthritis      Arthritis      Back pain      Bell's palsy 9/08    left     Breast CA (H) 2004-    left lumpectomy, radiation, -recurrence     Breast cancer (H) 2004    Left breast lumpectomy w LN disection, and radiation therapy     CKD (chronic kidney disease)     stage 3 baseline Cr 1.3     Colon polyps     colonoscopy-9/12-next due in 9/13     Congestive heart failure (H)      COPD (chronic obstructive pulmonary disease) (H)      Coronary artery disease      DM (diabetes mellitus) (H)      GERD (gastroesophageal reflux disease)      Hyperlipidemia      Hypertension      Lumbar spinal stenosis     use cane     Nicotine dependence      Obesity      Osteoporosis      Other chronic pain      Pacemaker      Permanent atrial fibrillation (H) 8/2008    S/P AV node ablation and pacemaker 10-25-12       Pleural effusion      Pulmonary hypertension (H)      PVD (peripheral vascular disease) (H)      Rectal stenosis      Tobacco abuse     current     Tricuspid regurgitation              Past Surgical History:   I have reviewed this patient's past surgical history  Past Surgical History:   Procedure Laterality Date     ARTHROPLASTY HIP Left 10/20/2018    Procedure: LEFT HIP HERACLIO-ARTHROPLASTY ;  Surgeon: Ish Fish MD;  Location:  OR     ARTHROSCOPY SHOULDER RT/LT  1999, 2004    Bilateral, right then left     BONE MARROW BIOPSY, BONE SPECIMEN, NEEDLE/TROCAR N/A 8/29/2017    Procedure: BIOPSY BONE MARROW;  BONE MARROW BIOPSY;  Surgeon: Marino Cohen MD;  Location:  GI     C DEXA, BONE DENSITY, AXIAL SKEL       C MAMMOGRAM, SCREENING  1/2009     COLONOSCOPY N/A 10/7/2016     Procedure: COMBINED COLONOSCOPY, SINGLE OR MULTIPLE BIOPSY/POLYPECTOMY BY BIOPSY;  Surgeon: Cassandra Mccord MD;  Location:  GI     COLONOSCOPY N/A 2/15/2019    Procedure: COLONOSCOPY;  Surgeon: Juan Pablo Hayden DO;  Location:  GI     ESOPHAGOSCOPY, GASTROSCOPY, DUODENOSCOPY (EGD), COMBINED N/A 8/10/2017    Procedure: COMBINED ESOPHAGOSCOPY, GASTROSCOPY, DUODENOSCOPY (EGD), BIOPSY SINGLE OR MULTIPLE;  gastroscopy;  Surgeon: Helene Bustamante MD;  Location:  GI     ESOPHAGOSCOPY, GASTROSCOPY, DUODENOSCOPY (EGD), COMBINED N/A 2/15/2019    Procedure: COMBINED ESOPHAGOSCOPY, GASTROSCOPY, DUODENOSCOPY (EGD);  Surgeon: Juan Pablo Hayden DO;  Location:  GI     H ABLATION AV NODE  10/2012     HC COLONOSCOPY THRU STOMA, DIAGNOSTIC  ? 2005     IMPLANT PACEMAKER  10/2012    [place] [id number], [id number]     IRRIGATION AND DEBRIDEMENT LOWER EXTREMITY, COMBINED Left 12/21/2018    Procedure: INCISION AND DRAINAGE OF LEFT HIP WITH EXCISION OF BONE FRAGMENT;  Surgeon: Ish Fish MD;  Location:  OR     JOINT REPLACEMTN, KNEE RT/LT      Joint Replacement knee bilateral     LAPAROSCOPIC CHOLECYSTECTOMY WITH CHOLANGIOGRAMS N/A 12/14/2015    Procedure: LAPAROSCOPIC CHOLECYSTECTOMY WITH CHOLANGIOGRAMS;  Surgeon: Ervin Amos MD;  Location:  OR     LUMPECTOMY BREAST      Left-2004     PHACOEMULSIFICATION CLEAR CORNEA WITH STANDARD INTRAOCULAR LENS IMPLANT Left 7/16/2015    Procedure: PHACOEMULSIFICATION CLEAR CORNEA WITH STANDARD INTRAOCULAR LENS IMPLANT;  Surgeon: Sai Obregon MD;  Location: [facility]     PHACOEMULSIFICATION CLEAR CORNEA WITH TORIC INTRAOCULAR LENS IMPLANT Right 5/9/2017    Procedure: PHACOEMULSIFICATION CLEAR CORNEA WITH TORIC INTRAOCULAR LENS IMPLANT;  RIGHT EYE PHACOEMULSIFICATION CLEAR CORNEA WITH TORIC INTRAOCULAR LENS IMPLANT ;  Surgeon: Duc Richmond MD;  Location: [facility]               Social History:   I have reviewed this patient's social history  Social History     Tobacco  Use     Smoking status: Former Smoker     Packs/day: 0.25     Years: 45.00     Pack years: 11.25     Types: Cigarettes     Last attempt to quit: 2018     Years since quittin.9     Smokeless tobacco: Never Used   Substance Use Topics     Alcohol use: No     Alcohol/week: 0.0 oz             Family History:   I have reviewed this patient's family history  Family History   Problem Relation Age of Onset     Hypertension Mother      Diabetes Mother          at 83 yrs     C.A.D. Father          age 70s     Breast Cancer No family hx of      Colon Cancer No family hx of              Allergies:     Allergies   Allergen Reactions     Ambien [Zolpidem Tartrate] Visual Disturbance     Hallucinations and fell out of bed at night.     Penicillins Hives     Definity      Caused a syncopal episode.     Sulfa Drugs Itching     Cymbalta Rash     Fluconazole Rash     Tolerates miconazole suppositories             Medications:   I have reviewed this patient's current medications  Medications Prior to Admission   Medication Sig Dispense Refill Last Dose     albuterol (PROAIR HFA, PROVENTIL HFA, VENTOLIN HFA) 108 (90 BASE) MCG/ACT inhaler Inhale 2 puffs into the lungs every 4 hours as needed for shortness of breath / dyspnea or wheezing 1 Inhaler 5 prn     apixaban ANTICOAGULANT (ELIQUIS) 2.5 MG tablet Take 1 tablet (2.5 mg) by mouth 2 times daily 60 tablet 1 5/3/2019 at am     betamethasone valerate (VALISONE) 0.1 % external cream APPLY TOPICALLY 2 TIMES DAILY USE SPARINGLY (Patient taking differently: APPLY TOPICALLY TO RASH UNDER BREAST DAILY USE SPARINGLY) 15 g 1 2019 at Unknown time     bisacodyl (DULCOLAX) 5 MG EC tablet Take 10 mg by mouth At Bedtime   2019 at pm     denosumab (PROLIA) 60 MG/ML SOLN injection Inject 1 mL (60 mg) Subcutaneous every 6 months 1 mL 1 Past Month at Unknown time     fluticasone (FLONASE) 50 MCG/ACT nasal spray Spray 2 sprays into both nostrils daily (Patient taking differently:  Spray 2 sprays into both nostrils every evening ) 15.8 mL 11 5/2/2019 at pm     glipiZIDE (GLUCOTROL) 5 MG tablet Take 1 tablet (5 mg) by mouth every morning (before breakfast) 90 tablet 1 5/3/2019 at am     hydrOXYzine (VISTARIL) 25 MG capsule Take 1 capsule (25 mg) by mouth 2 times daily as needed for itching 180 capsule 1 5/2/2019 at Unknown time     hypromellose (ARTIFICIAL TEARS) 0.5 % SOLN ophthalmic solution Place 1 drop into both eyes every hour as needed for dry eyes   prn     lidocaine (LIDODERM) 5 % patch Place 1 patch onto the skin daily as needed back   Past Week at Unknown time     LYRICA 75 MG capsule TAKE 1 CAPSULE BY MOUTH TWICE A DAY 60 capsule 1 5/3/2019 at am     metoprolol tartrate (LOPRESSOR) 25 MG tablet TAKE 1 TABLET BY MOUTH TWICE A DAY 90 tablet 1 5/3/2019 at am     PROCTOSOL HC 2.5 % cream PLACE RECTALLY 2 TIMES DAILY AS NEEDED FOR HEMORRHOIDS 28.35 g 1 prn     ranitidine (ZANTAC) 150 MG tablet Take 1 tablet (150 mg) by mouth At Bedtime (Patient taking differently: Take 150 mg by mouth daily ) 90 tablet 3 5/2/2019 at Unknown time     simvastatin (ZOCOR) 10 MG tablet Take 1 tablet (10 mg) by mouth At Bedtime 90 tablet 3 5/2/2019 at pm     spironolactone (ALDACTONE) 25 MG tablet Take 1-2 tablets (25-50 mg) by mouth daily (Patient taking differently: Take 25 mg by mouth daily ) 60 tablet 3 5/3/2019 at am     tiotropium (SPIRIVA) 18 MCG inhaled capsule Inhale 1 capsule (18 mcg) into the lungs daily 90 capsule 1 5/2/2019 at am     torsemide (DEMADEX) 20 MG tablet TAKE 1 TABLET BY MOUTH EVERY DAY 90 tablet 1 5/3/2019 at am     traMADol (ULTRAM) 50 MG tablet Take 50 mg by mouth every 6 hours as needed for severe pain   prn     ACCU-CHEK SMARTVIEW test strip USE 1 STRIP BY IN VITRO ROUTE 2 TIMES DAILY OR AS DIRECTED 200 strip 0 Taking     ACE/ARB/ARNI NOT PRESCRIBED (INTENTIONAL) Please choose reason not prescribed, below   Taking     ASPIRIN NOT PRESCRIBED (INTENTIONAL) Please choose reason not  prescribed, below   Taking     blood glucose monitoring (NO BRAND SPECIFIED) meter device kit Accucheck Mariza meter; verified with pharmacist 1 kit 0 Taking     Current Facility-Administered Medications Ordered in Epic   Medication Dose Route Frequency Last Rate Last Dose     acetaminophen (TYLENOL) Suppository 650 mg  650 mg Rectal Q4H PRN         acetaminophen (TYLENOL) tablet 650 mg  650 mg Oral Q4H PRN   650 mg at 05/04/19 0018     albuterol (PROAIR HFA/PROVENTIL HFA/VENTOLIN HFA) 108 (90 Base) MCG/ACT inhaler 2 puff  2 puff Inhalation Q4H PRN         bisacodyl (DULCOLAX) EC tablet 10 mg  10 mg Oral Daily PRN   10 mg at 05/04/19 1231     bisacodyl (DULCOLAX) Suppository 10 mg  10 mg Rectal Daily PRN         fluticasone (FLONASE) 50 MCG/ACT spray 2 spray  2 spray Both Nostrils QPM   2 spray at 05/03/19 2147     furosemide (LASIX) injection 60 mg  60 mg Intravenous TID   60 mg at 05/04/19 1013     hypromellose-dextran (ARTIFICAL TEARS) 0.1-0.3 % ophthalmic solution 1 drop  1 drop Both Eyes Q1H PRN         lidocaine (LMX4) cream   Topical Q1H PRN         lidocaine 1 % 0.1-1 mL  0.1-1 mL Other Q1H PRN         magnesium hydroxide (MILK OF MAGNESIA) suspension 30 mL  30 mL Oral Daily PRN         melatonin tablet 1 mg  1 mg Oral At Bedtime PRN   1 mg at 05/04/19 0020     metoprolol tartrate (LOPRESSOR) tablet 25 mg  25 mg Oral BID   25 mg at 05/04/19 1009     naloxone (NARCAN) injection 0.1-0.4 mg  0.1-0.4 mg Intravenous Q2 Min PRN         ondansetron (ZOFRAN-ODT) ODT tab 4 mg  4 mg Oral Q6H PRN        Or     ondansetron (ZOFRAN) injection 4 mg  4 mg Intravenous Q6H PRN         Patient is already receiving anticoagulation with heparin, enoxaparin (LOVENOX), warfarin (COUMADIN)  or other anticoagulant medication   Does not apply Continuous PRN         polyethylene glycol (MIRALAX/GLYCOLAX) Packet 17 g  17 g Oral Daily PRN         pregabalin (LYRICA) capsule 75 mg  75 mg Oral BID   75 mg at 05/04/19 1009     ranitidine  (ZANTAC) tablet 150 mg  150 mg Oral At Bedtime   150 mg at 05/03/19 2146     senna-docusate (SENOKOT-S/PERICOLACE) 8.6-50 MG per tablet 1 tablet  1 tablet Oral BID PRN        Or     senna-docusate (SENOKOT-S/PERICOLACE) 8.6-50 MG per tablet 2 tablet  2 tablet Oral BID PRN   2 tablet at 05/03/19 2145     simvastatin (ZOCOR) tablet 10 mg  10 mg Oral At Bedtime   10 mg at 05/03/19 2145     sodium chloride (PF) 0.9% PF flush 3 mL  3 mL Intracatheter q1 min prn         sodium chloride (PF) 0.9% PF flush 3 mL  3 mL Intracatheter Q8H   3 mL at 05/04/19 0530     [START ON 5/5/2019] spironolactone (ALDACTONE) tablet 25 mg  25 mg Oral Daily         traMADol (ULTRAM) tablet 50 mg  50 mg Oral Q6H PRN   50 mg at 05/04/19 0258     umeclidinium (INCRUSE ELLIPTA) 62.5 MCG/INH inhaler 1 puff  1 puff Inhalation Daily         No current [facility]-ordered outpatient medications on file.             Review of Systems:   The 10 point Review of Systems is negative other than noted in the HPI            Data:   All laboratory data reviewed  Results for orders placed or performed during the hospital encounter of 05/03/19 (from the past 24 hour(s))   UA with Microscopic   Result Value Ref Range    Color Urine Light Yellow     Appearance Urine Clear     Glucose Urine Negative NEG^Negative mg/dL    Bilirubin Urine Negative NEG^Negative    Ketones Urine Negative NEG^Negative mg/dL    Specific Gravity Urine 1.006 1.003 - 1.035    Blood Urine Negative NEG^Negative    pH Urine 7.5 (H) 5.0 - 7.0 pH    Protein Albumin Urine Negative NEG^Negative mg/dL    Urobilinogen mg/dL Normal 0.0 - 2.0 mg/dL    Nitrite Urine Negative NEG^Negative    Leukocyte Esterase Urine Trace (A) NEG^Negative    Source Midstream Urine     WBC Urine 3 0 - 5 /HPF    RBC Urine 8 (H) 0 - 2 /HPF    Bacteria Urine Few (A) NEG^Negative /HPF    Squamous Epithelial /HPF Urine 6 (H) 0 - 1 /HPF    Mucous Urine Present (A) NEG^Negative /LPF    Hyaline Casts 8 (H) 0 - 2 /LPF   EKG 12-lead,  tracing only   Result Value Ref Range    Interpretation ECG Click View Image link to view waveform and result    CBC with platelets differential   Result Value Ref Range    WBC 4.9 4.0 - 11.0 10e9/L    RBC Count 3.79 (L) 3.8 - 5.2 10e12/L    Hemoglobin 9.5 (L) 11.7 - 15.7 g/dL    Hematocrit 32.2 (L) 35.0 - 47.0 %    MCV 85 78 - 100 fl    MCH 25.1 (L) 26.5 - 33.0 pg    MCHC 29.5 (L) 31.5 - 36.5 g/dL    RDW 19.4 (H) 10.0 - 15.0 %    Platelet Count 159 150 - 450 10e9/L    Diff Method Automated Method     % Neutrophils 72.9 %    % Lymphocytes 12.7 %    % Monocytes 11.4 %    % Eosinophils 2.2 %    % Basophils 0.8 %    % Immature Granulocytes 0.0 %    Nucleated RBCs 0 0 /100    Absolute Neutrophil 3.6 1.6 - 8.3 10e9/L    Absolute Lymphocytes 0.6 (L) 0.8 - 5.3 10e9/L    Absolute Monocytes 0.6 0.0 - 1.3 10e9/L    Absolute Eosinophils 0.1 0.0 - 0.7 10e9/L    Absolute Basophils 0.0 0.0 - 0.2 10e9/L    Abs Immature Granulocytes 0.0 0 - 0.4 10e9/L    Absolute Nucleated RBC 0.0    Comprehensive metabolic panel   Result Value Ref Range    Sodium 141 133 - 144 mmol/L    Potassium 4.5 3.4 - 5.3 mmol/L    Chloride 102 94 - 109 mmol/L    Carbon Dioxide 33 (H) 20 - 32 mmol/L    Anion Gap 6 3 - 14 mmol/L    Glucose 167 (H) 70 - 99 mg/dL    Urea Nitrogen 40 (H) 7 - 30 mg/dL    Creatinine 1.84 (H) 0.52 - 1.04 mg/dL    GFR Estimate 25 (L) >60 mL/min/[1.73_m2]    GFR Estimate If Black 29 (L) >60 mL/min/[1.73_m2]    Calcium 9.5 8.5 - 10.1 mg/dL    Bilirubin Total 0.6 0.2 - 1.3 mg/dL    Albumin 3.3 (L) 3.4 - 5.0 g/dL    Protein Total 7.4 6.8 - 8.8 g/dL    Alkaline Phosphatase 73 40 - 150 U/L    ALT 14 0 - 50 U/L    AST 10 0 - 45 U/L   Troponin I   Result Value Ref Range    Troponin I ES <0.015 0.000 - 0.045 ug/L   Nt probnp inpatient (BNP)   Result Value Ref Range    N-Terminal Pro BNP Inpatient 9,289 (H) 0 - 1,800 pg/mL   ABO/Rh type and screen   Result Value Ref Range    ABO O     RH(D) Pos     Antibody Screen Neg     Test Valid Only At  [facility]        Specimen Expires 05/06/2019    ISTAT gases lactate terrance POCT   Result Value Ref Range    Ph Venous 7.37 7.32 - 7.43 pH    PCO2 Venous 41 40 - 50 mm Hg    PO2 Venous 41 25 - 47 mm Hg    Bicarbonate Venous 24 21 - 28 mmol/L    O2 Sat Venous 75 %    Lactic Acid 1.3 0.7 - 2.1 mmol/L   XR Chest 2 Views    Narrative    CHEST TWO VIEWS   5/3/2019 5:46 PM     HISTORY: Shortness of breath.    COMPARISON: 3/1/2019      Impression    IMPRESSION: Lungs are hypoinflated with perihilar and basilar  opacities which may represent atelectasis, aspiration, or infection.  Component of edema cannot be excluded. Small bilateral pleural  effusions. Cardiac silhouette is enlarged, unchanged. Dual lead  cardiac pacing device is unchanged. Spinal degenerative change and  vertebroplasty change noted.    WILL FLORES MD   Glucose by meter   Result Value Ref Range    Glucose 75 70 - 99 mg/dL   Basic metabolic panel   Result Value Ref Range    Sodium 140 133 - 144 mmol/L    Potassium 4.0 3.4 - 5.3 mmol/L    Chloride 102 94 - 109 mmol/L    Carbon Dioxide 33 (H) 20 - 32 mmol/L    Anion Gap 5 3 - 14 mmol/L    Glucose 65 (L) 70 - 99 mg/dL    Urea Nitrogen 40 (H) 7 - 30 mg/dL    Creatinine 1.75 (H) 0.52 - 1.04 mg/dL    GFR Estimate 27 (L) >60 mL/min/[1.73_m2]    GFR Estimate If Black 31 (L) >60 mL/min/[1.73_m2]    Calcium 9.5 8.5 - 10.1 mg/dL   CBC with platelets   Result Value Ref Range    WBC 4.4 4.0 - 11.0 10e9/L    RBC Count 3.70 (L) 3.8 - 5.2 10e12/L    Hemoglobin 9.3 (L) 11.7 - 15.7 g/dL    Hematocrit 31.4 (L) 35.0 - 47.0 %    MCV 85 78 - 100 fl    MCH 25.1 (L) 26.5 - 33.0 pg    MCHC 29.6 (L) 31.5 - 36.5 g/dL    RDW 19.5 (H) 10.0 - 15.0 %    Platelet Count 169 150 - 450 10e9/L   US Renal Complete    Narrative    ULTRASOUND RETROPERITONEAL COMPLETE  5/4/2019 9:13 AM     HISTORY: Acute kidney injury.    COMPARISON: December 3, 2015    FINDINGS: The bilateral renal parenchyma are unremarkable in  echogenicity  without evidence for shadowing stone or mass. Simple  cysts in both kidneys measuring up to 2.3 cm on the right and 3.1 cm  on the left. No hydronephrosis. Right kidney measures 10.4 x 4.5 x 3.9  cm and the left measures 11.7 x 4.4 x 4.3 cm. Cortical thickness is  1.1 cm on the right and 1.2 cm on the left.  Bladder is unremarkable  given its level of distention.      Impression    IMPRESSION: No obstruction demonstrated.    MABLE VIZCARRA MD   Glucose by meter   Result Value Ref Range    Glucose 87 70 - 99 mg/dL     *Note: Due to a large number of results and/or encounters for the requested time period, some results have not been displayed. A complete set of results can be found in Results Review.

## 2019-05-04 NOTE — PLAN OF CARE
Discharge Planner OT   Patient plan for discharge: Unknown.  Current status: Order rec'd. Patient has been evaluated by PT who reports patient able to dress herself today, but has A with compression stockings AM and PM at baseline, also has A with bathing. She is without a concern for impaired cognition.   Barriers to return to prior living situation: Defer to PT.   Recommendations for discharge: Defer to PT.   Rationale for recommendations: OT eval not indicated as patient appears to be at her baseline with ADL. PT continues to address mobility and balance needs here. Order completed.       Entered by: Tati Thompson 05/04/2019 10:47 AM

## 2019-05-05 NOTE — PROGRESS NOTES
[facility]    Medicine Progress Note - Hospitalist Service       Date of Admission:  5/3/2019  Assessment & Plan   Helene Gbibs is a 84 year-old female who presents with generalized weakness and shortness of breath. Admitted 5/3/2019 for further evaluation and treatment.     Acute diastolic congestive heart failure exacerbation  Presents with generalized weakness, fatigue, increased shortness of breath using home oxygen throughout the day rather than just at night. NtpBNP 9289 on admission with a troponin of less than 0.015. Echocardiogram completed in February 2019 demonstrated moderately severe tricuspid regurgitation, severe pulmonary hypertension, and ejection fraction of 50 to 55% with severe biatrial enlargement.  - Cardiology consulted, appreciate assistance  - Furosemide IV reduced to BID per cardiology  - Daily weights, strict I/O  - Daily BMP while diuresing  - Continue spironolactone     Hypertension  - Blood pressures well-controlled  - Continue prior to admission metoprolol, spironolactone  - Diuresis as above     Hyperlipidemia  - Continue prior to admission simvastatin     Chronic atrial fibrillation status post ablation and pacemaker placement  - Continue prior to admission metoprolol   - EP consulted per cardiology for Watchmen timing  - Holding prior to admission apixaban, per cardiology note on 5/1/19 is to be held for 2 days prior to procedure     Chronic kidney disease, stage III-IV  Baseline creatinine variable, over past ~6 months ranging anywhere from 1.3 to 2.2. Creatinine 1.84 on admission.  Urinalysis on admission appears contaminated with 6 squamous cells, though still with only 3 WBC and 8 RBC  - Creatinine generally stable  - Daily BMP with diuresis  - Avoid nephrotoxins     Anemia, with history of colonic angioid ectasias  Patient with a hemoglobin of 9.5 on admission.  Denies signs of blood loss.  - Monitor.     Diabetes mellitus type 2  Prior to admission on  glipizide. Hemoglobin A1c 6.3% 4/9/19  - Hold oral agents  - Blood sugars have been well-controlled without need for insulin, will monitor blood sugars with BMP and otherwise defer insulin or frequent blood sugar checks     History of Bell's palsy  Stable.  - Continue prior to admission Lyrica.     COPD  Stable.  - Continue prior to admission inhalers.     GERD  Stable.  - Continue prior to admission ranitidine.     Pruritis  - Ordered prior to admission hydroxyzine     Constipation  - Continue prior to admission bisacodyl regimen     Diet: Combination Diet Low Saturated Fat Na <2400mg Diet, No Caffeine Diet    DVT Prophylaxis: Apixaban held, resume when able once timing of procedure determined  Montano Catheter: not present  Code Status: DNR/DNI      Disposition Plan   Expected discharge: Pending transition to oral diuretics, possible Watchmen procedure  Entered: Justin Gallegos MD 05/05/2019, 3:36 PM       The patient's care was discussed with the Patient.    Justin Gallegos MD  Hospitalist Service  [facility]    ______________________________________________________________________    Interval History   No acute events overnight.  Patient reports her dyspnea has improved.  Feeling constipated, which is baseline for her.  Denies any chest pain.  Feels some abdominal bloating related to constipation.  Otherwise no new symptoms.    Data reviewed today: I reviewed all medications, new labs and imaging results over the last 24 hours. I personally reviewed no images or EKG's today.    Physical Exam   Vital Signs: Temp: 98.4  F (36.9  C) Temp src: Oral BP: 124/71 Pulse: 69   Resp: 15 SpO2: 96 % O2 Device: None (Room air) Oxygen Delivery: 2 LPM  Weight: 160 lbs 12.8 oz    Constitutional: Well-appearing, NAD  Respiratory: Clear to auscultation bilaterally, good air movement bilaterally  Cardiovascular: RRR, no m/r/g. No peripheral edema.  GI: Soft, mildly distended. Non-tender  Skin/Integumen: Warm,  dry  Other:      Data   Recent Labs   Lab 05/05/19  0603 05/04/19  0615 05/03/19  1716   WBC  --  4.4 4.9   HGB  --  9.3* 9.5*   MCV  --  85 85   PLT  --  169 159    140 141   POTASSIUM 4.1 4.0 4.5   CHLORIDE 99 102 102   CO2 35* 33* 33*   BUN 48* 40* 40*   CR 1.77* 1.75* 1.84*   ANIONGAP 4 5 6   GERARD 9.7 9.5 9.5   * 65* 167*   ALBUMIN  --   --  3.3*   PROTTOTAL  --   --  7.4   BILITOTAL  --   --  0.6   ALKPHOS  --   --  73   ALT  --   --  14   AST  --   --  10   TROPI  --   --  <0.015       No results found for this or any previous visit (from the past 24 hour(s)).  Medications     - MEDICATION INSTRUCTIONS -         fluticasone  2 spray Both Nostrils QPM     furosemide  40 mg Intravenous BID     metoprolol tartrate  25 mg Oral BID     pregabalin  75 mg Oral BID     ranitidine  150 mg Oral At Bedtime     simvastatin  10 mg Oral At Bedtime     sodium chloride (PF)  3 mL Intracatheter Q8H     spironolactone  25 mg Oral Daily     umeclidinium  1 puff Inhalation Daily

## 2019-05-05 NOTE — PLAN OF CARE
"Patient is A/O x4. VSS on 1 L O2. LS diminished in all fields, denies SOB, mild POND. Encouraged IS, up to 1500. Voiding frequently, good urine output. Prn tylenol and melatonin given at HS for sleep and generalized pain. Patient refused walk at HS, states \"too tired\". Ambulating independently in room. Tele 100% V-paced. Cardiac diet, tolerating well. Plan on continuing to monitor.     "

## 2019-05-05 NOTE — PLAN OF CARE
Neuro- A&Ox4 but forgetful to situation at times when waking up    Vitals- stable except soft BP    Tele- 100% V-Paced    Resp- continues on 1.5L via NC    Activity- SB with 4-wheeled walker    Pain- denies    Drips- none SL    Drains/Tubes- none    Skin- trace edema BLE, reddened coccyx    GI/- no issues    Plan- watchmen placement on monday    Misc- n/a

## 2019-05-05 NOTE — PLAN OF CARE
Discharge Planner PT   Patient plan for discharge: Home  Current status: Pt with good sitting balance at EOB. Pt performs sit to/from stand and stand pivot transfer with 4ww and SBA. Pt ambulates 150' with 4ww and SBA. Recommend pt ambulate with RN staff 3-4x/day.  Barriers to return to prior living situation: Decreased activity tolerance  Recommendations for discharge: ASHLEY with home health PT  Rationale for recommendations: Anticipate with further medical management and therapy, patient will be safe to discharge home with preexisting assist. Pt will benefit from HHPT as she is currently below baseline mobility.       Entered by: Albina Tran 05/05/2019 9:32 AM

## 2019-05-05 NOTE — PROGRESS NOTES
Cardiology Progress Note          Assessment and Plan:         85 YO F with     1. HFpEF  2. Moderate to severe TR and RV dilatation/dysfunction with severe pulmonary HTN  3. Chronic AF s/p AVN ablation/PPM implantation  4. Anemia secondary to colonic ectasias - plan for Watchman next week  5. RICK  6. DM    PLAN  - Symptoms improving, decrease lasix to BID  - not a candidate for invasive therapy for moderate-severe TR  - EP consultation tomorrow to discuss Watchman device scheduling        Interval History:     Feels better from a breathing standpoint this AM. No CP.             Review of Systems:   As per subjective, otherwise 5 systems reviewed and negative.           Physical Exam:   Blood pressure 108/66, pulse 74, temperature 97.5  F (36.4  C), temperature source Oral, resp. rate 16, weight 72.9 kg (160 lb 12.8 oz), SpO2 91 %, not currently breastfeeding.      Vital Sign Ranges  Temperature Temp  Av.6  F (36.4  C)  Min: 97.2  F (36.2  C)  Max: 98  F (36.7  C)   Blood pressure Systolic (24hrs), Av , Min:92 , Max:134        Diastolic (24hrs), Av, Min:53, Max:85      Pulse Pulse  Av.4  Min: 70  Max: 77   Respirations Resp  Av.3  Min: 16  Max: 20   Pulse oximetry SpO2  Av.1 %  Min: 87 %  Max: 94 %         Intake/Output Summary (Last 24 hours) at 2019 0842  Last data filed at 2019 0800  Gross per 24 hour   Intake 560 ml   Output 3425 ml   Net -2865 ml       Constitutional: NAD  Skin: Warm and dry  Neck: No JVD  Lungs: CTA  Cardiovascular: RRR, 2/6 systolic murmur of TR  Abdomen: Soft, non tender.  Extremities and Back: +1 BLE edema  Neurological: Nonfocal           Medications:     I have reviewed this patient's current medications.              Data:     Labs reviewed.     Tele: Paced        Esutardo Roman M.D.

## 2019-05-06 NOTE — PLAN OF CARE
PT: Attempted to see patient for PT session. Pt up in the chair and states she is having a procedure this morning and would rather just rest until her procedure and then take a walk later today. Declined any other activity.

## 2019-05-06 NOTE — PLAN OF CARE
Plan: EP consult in morning. Outpatient JOAN scheduled 5/6 and Watchman procedure on 5/7    Neuro: A/Ox4, one incident of confusion upon waking. No issues today    Vitals: stable    Tele: 100% V paced with permanent,     Respiratory: room air (has home O2)    GI/: was constipated. Gave suppository, milk of magnesia and prune juice. Stool now loose.    Activity: stand by with walker    Discharge: TBD, Wednesday? Home     FYI: eliquis on hold for procedures early this week

## 2019-05-06 NOTE — PROGRESS NOTES
[facility]    Cardiology Progress Note     Assessment & Plan   Helene Gibbs is a 84 year old female who was admitted on 5/3/2019.    Briefly, she is 84-year-old female who is followed by electrophysiology team for known history of atrial fibrillation status post AV alexandra ablation and pacemaker implantation in 2012,  high chads VASC score of 4 and not a candidate for anticoagulation due to colonic ectasias, moderate to severe tricuspid regurgitation complicated by moderate right ventricular dilation and systolic dysfunction and pulmonary hypertension who presented to the hospital with complaints of malaise and generalized fatigue.  She was found to be volume overloaded and underwent aggressive diuresis and her symptoms were thought to be secondary to diastolic dysfunction versus right-sided heart failure.    1.  Heart failure with preserved ejection fraction  2.  Moderate to severe tricuspid regurgitation  3.  RV dilation with systolic dysfunction  4.  Moderate to severe pulmonary hypertension  5.  Atrial fibrillation status post AV alexandra ablation/permanent pacemaker implantation in 2012.  Chads vascular score of 4  6.  Colonic ectasia causing anemia  7.  Diabetes mellitus type 2  8.  Acute renal injury    Plan-  Her symptoms are most likely due to combination of heart failure with preserved ejection fraction and RV dysfunction in the setting of moderate to severe tricuspid regurgitation and pulmonary hypertension.  The patient is on 40 mg of IV Lasix twice a day. She has been diuresing well and has been tolerating dose without any problems.  I will continue her on the current dose.  She is scheduled to undergo watchman procedure tomorrow.    Amador Sheth MD  Text Page (7am - 5pm, M-F)    Interval History   Patient overall feels well.  Her symptoms have not completely resolved but has significantly improved since her admission.    Physical Exam   Temp: 97.7  F (36.5  C) Temp src: Oral BP:  113/63 Pulse: 69 Heart Rate: 70 Resp: 16 SpO2: 93 % O2 Device: Nasal cannula Oxygen Delivery: 2 LPM  Vitals:    05/04/19 0500 05/05/19 0524 05/06/19 0300   Weight: 74.4 kg (164 lb) 72.9 kg (160 lb 12.8 oz) 71.9 kg (158 lb 9.6 oz)     Vital Signs with Ranges  Temp:  [97.7  F (36.5  C)-98.7  F (37.1  C)] 97.7  F (36.5  C)  Pulse:  [69-76] 69  Heart Rate:  [70-71] 70  Resp:  [12-18] 16  BP: ()/(46-77) 113/63  FiO2 (%):  [2 %-4 %] 2 %  SpO2:  [88 %-100 %] 93 %  I/O last 3 completed shifts:  In: 360 [P.O.:360]  Out: 1100 [Urine:1100]  Patient Active Problem List   Diagnosis     Type 2 diabetes mellitus with diabetic nephropathy (H)     Anemia     CKD (chronic kidney disease) stage 3, GFR 30-59 ml/min (H)     Chronic low back pain     Pleural effusion, left     Adjustment reaction with anxiety and depression     c diff colitis 11-12-12     Pulmonary hypertension (H)     Melanosis of colon     Diplopia     COPD (chronic obstructive pulmonary disease) (H)     Elevated TSH     CHF (congestive heart failure) (H)     Cardiac pacemaker in situ     Neck pain     Intractable back pain     Health Care Home     Mild cognitive impairment SLUMS = 22/ 30  CPT = 5.0/ 5.5 7-2014     Hypertension goal BP (blood pressure) < 140/90     Depression with anxiety     Tobacco abuse: [age]y/o on 8-12-14 @ 1ppd=50 pk yr hx      Stasis dermatitis     Thoracic disc herniation     Obesity     Hyperlipidemia     Nausea and vomiting     Cholelithiasis with acute on chronic cholecystitis     Slow transit constipation     Gastroesophageal reflux disease without esophagitis     Permanent atrial fibrillation (H)     Basal cell carcinoma of skin     Chronic pain syndrome     Tubular adenoma     Chronic atrial fib, S/P ablation -- on Eliquis     Gastrointestinal hemorrhage, unspecified gastrointestinal hemorrhage type     Iron deficiency     Adverse effect of iron     History of bone marrow biopsy     Herpes zoster without complication     Controlled  substance agreement signed     Other chronic pain     Hematoma of groin     Groin hematoma, initial encounter     Pulmonary nodule     Chest pressure     NSTEMI (non-ST elevated myocardial infarction) (H)     (HFpEF) heart failure with preserved ejection fraction (H)     Generalized weakness     Leg fracture, left     CKD (chronic kidney disease) stage 4, GFR 15-29 ml/min (H)     S/p left hip fracture 18 -- persistent drainage     Anemia, iron deficiency     Peripheral vascular disease (H)     Acute respiratory failure with hypoxia (H)         Medications     - MEDICATION INSTRUCTIONS -         fluticasone  2 spray Both Nostrils QPM     furosemide  40 mg Intravenous BID     metoprolol tartrate  25 mg Oral BID     pregabalin  75 mg Oral BID     ranitidine  150 mg Oral At Bedtime     simvastatin  10 mg Oral At Bedtime     spironolactone  25 mg Oral Daily     umeclidinium  1 puff Inhalation Daily       Data   Results for orders placed or performed during the hospital encounter of 19 (from the past 24 hour(s))   Basic metabolic panel   Result Value Ref Range    Sodium 140 133 - 144 mmol/L    Potassium 3.9 3.4 - 5.3 mmol/L    Chloride 100 94 - 109 mmol/L    Carbon Dioxide 35 (H) 20 - 32 mmol/L    Anion Gap 5 3 - 14 mmol/L    Glucose 125 (H) 70 - 99 mg/dL    Urea Nitrogen 49 (H) 7 - 30 mg/dL    Creatinine 1.73 (H) 0.52 - 1.04 mg/dL    GFR Estimate 27 (L) >60 mL/min/[1.73_m2]    GFR Estimate If Black 31 (L) >60 mL/min/[1.73_m2]    Calcium 9.7 8.5 - 10.1 mg/dL   Transesophageal Echocardiogram    [facility]    [id number]  [id number]  [id number]^REBECCA^LAINEY^[facility]           [facility]  Echocardiography Laboratory  [address] [place] [address]        Name: PATRICIA BOBO  MRN: [id number]  : 1935  Study Date: 2019 01:18 PM  Age: 84 yrs  Gender: Female  Patient Location: [facility]  Reason For Study: Afib  Ordering Physician: LAINEY FERNANDEZ  Referring Physician:  Neisha Esparza  Performed By: Lino Gooden RDCS     BSA: 1.8 m2  Height: 65 in  Weight: 158 lb  HR: 73  BP: 101/65 mmHg  _____________________________________________________________________________  __        Procedure  Complete JOAN Adult. 3D image acquisition, reconstruction, and real-time  interpretation was performed.  _____________________________________________________________________________  __        Interpretation Summary     No thrombus is detected in the left atrial appendage.  Spontaneous contrast in left atrial appendage.  MISAEL measurements noted below.  The right ventricle is severely dilated.  The right ventricular systolic function is mildly reduced.  There is mod-severe to severe (3-4+) tricuspid regurgitation.  RVSP 26 plus RAP.  3D evaluation of TV reveals central jet related to non coaptation of the  leaflets. The study was technically adequate. There is no comparison study  available.  _____________________________________________________________________________  __        JOAN  I determined this patient to be an appropriate candidate for the planned  sedation and procedure and have reassessed the patient immediately prior to  sedation and procedure. Total sedation time: 30 minutes of continuous bedside  1:1 monitoring. Versed (1.5mg) was given intravenously. Fentanyl (30mcg) was  given intravenously. The transesophageal probe was passed without difficulty.  Prior to the exam, the oral cavity was checked and no overcrowding was noted.  Consent to the procedure was obtained prior to sedation. There were no  complications associated with this procedure.     Left Ventricle  The left ventricle is normal in size. Left ventricular systolic function is  normal. The visual ejection fraction is estimated at 55-60%. Septal motion is  consistent with conduction abnormality. Flattened septum consistent with right  ventricular volume overload.     Right Ventricle  The right ventricle is severely dilated.  The right ventricular systolic  function is mildly reduced.     Atria  The left atrium is severely dilated. Spontaneous contrast in left atrium. The  right atrium is severely dilated. RA much larger than LA. There is no color  Doppler evidence of an atrial shunt. A contrast injection (Bubble Study) was  performed that was negative for flow across the interatrial septum.  Spontaneous contrast in left atrial appendage. No thrombus is detected in the  left atrial appendage. MISAEL diameter x depth (cm) at 45 degrees is: 1.7 x 2.3.  MISAEL diameter x depth (cm) at 90 degrees is: 1.9 x 2.3. MISAEL diameter x depth  (cm) at 135 degrees is: 1.9 x 3. MISAEL diameter x depth (cm) at 0 degrees is:  1.7 x 3.2.        Mitral Valve  The mitral valve leaflets are mildly thickened. There is mild to moderate  mitral annular calcification. There is mild to moderate (1-2+) mitral  regurgitation.     Tricuspid Valve  There is mod-severe to severe (3-4+) tricuspid regurgitation. The right  ventricular systolic pressure is approximated at 25.8 mmHg plus the right  atrial pressure.     Aortic Valve  The aortic valve is trileaflet. There is mild trileaflet aortic sclerosis. No  aortic regurgitation is present. No aortic stenosis is present.     Pulmonic Valve  There is trace to mild pulmonic valvular regurgitation.     Vessels  The aortic root is normal size.        Pericardial/Pleural  There is no pericardial effusion.     Rhythm  The rhythm was paced.  _____________________________________________________________________________  __           Doppler Measurements & Calculations  TV max P.8 mmHg  TR max batool: 253.9 cm/sec  TR max P.8 mmHg           _____________________________________________________________________________  __           Report approved by: Hamlet Reyes 2019 04:28 PM        *Note: Due to a large number of results and/or encounters for the requested time period, some results have not been displayed. A complete  set of results can be found in Results Review.

## 2019-05-06 NOTE — PLAN OF CARE
Pt has been pain free with V/signs stable and maintaining her O2 sats in the mid 90's prior to JOAN procedure. No clot noted per Dr Bailey but he will be looking closer at the report for further information. Pt being work-up for Watchmen procedure ( 5/7/19) Pt was awake and alert post procedure with O2  @ 2 L since she dropped in the high 80's post sedation. Remains 100 % V Paced..

## 2019-05-06 NOTE — PROGRESS NOTES
1300   A/O. Pt denies difficulty swallowing or sleep apnea. No dentures or loose teeth. NPO since 2300 5/5/19.  All questions & concerns addressed.       1343  MD Sedation Assessment completed at this time.  1344 Time Out done at this time.    JOAN: Pt tolerated well. VSS. Total sedation given - 1.5 mg Versed & 25 mcg Fentanyl. Dr Bailey  spoke with pt post procedure.See JOAN Flowsheet.     1510  [employer] [place] rep here at cartside.  PPM interrogated.  Dolores states PPM working properly.    1525 Pt transferred to room 246-1 per cart. Detailed report given to Xiomy Partida RN.  Resp even & unlabored upon transfer.  Pt  A/O. Pt to be NPO until 1530. Both pt & nurse informed.

## 2019-05-06 NOTE — PLAN OF CARE
A&Ox4. VSS on RA. Denies pain. NPO for procedure today. Up SBA with walker to bathroom. Coccyx red and blanchable. Continue to monitor.

## 2019-05-06 NOTE — PROGRESS NOTES
"[facility], [place]    Electrophysiology Progress Note    Date of Service (when I saw the patient): 05/06/2019     Assessment & Plan   Helene Gibbs is a 84 year old female with h/o permanent atrial fibrillation s/p AVN ablation and PPM 2012, medical mgmt for CAD, angiectasias with severe anemia with plans for left atrial appendage occluder device to be done tomorrow 5/7 and HFpEF with moderate RV dysfunction,  who was admitted on 5/3/2019 due to generalized weakness and SOB.  She was started on diuretic therapy.  EP asked to evaluate timing of Watchman device.    1. HFpEF -   - Echo 2/2019 with EF 50-55%. RV with moderate dilation and moderately reduced RVSF.  Tricuspid valve with 3+ TR and RVSP 56 mmHg.    - PTA on spironolactone 25 mg daily and torsemide 20 mg daily. Here, got Lasix 20 mg IV 5/3, Lasix 120 mg IV 5/4 and Lasix 80 mg IV 5/5.  Due for Lasix 40 mg IV BID today.     - Weight is down 3 kg c/w admit and I/Os are down 4 L since admit. Remains on RA.  - BMP with stable/improved Cr to 1.73     PLAN:   * Per General Cardiology (Dr. Sheth/Tina Browning)   * Appears to be diuresing well She is feeling \"really good\" from breathing standpoint and O2 sats are >90% without O2    2. Permanent atrial fibrillation with colonic angiectasias -   - Hgb stable this admission >9.0 g/dL. Had been in the 7s 2/2019  - Trying to avoid long term AC but note CHADSVASc 6 (CHF, HTN, CAD, age, sex)    - Initial plan was for outpatient JOAN today to assess for MISAEL thrombus and measurement for her device.  Plan was then for Watchman procedure on Tuesday 5/7.   - I discussed with Dr. Partida and as long as she is close to baseline from breathing standpoint, agrees that she can proceed with Watchman procedure as planned.       PLAN:   * JOAN.  Angelica had previously reviewed R/B/I but I did so again and consent form was signed. Upper Plate has been removed.   We discussed Risks, Benefits and Indications of proceeding with " transesophageal echocardiogram (JOAN), including but not limited to use of conscious sedation, sore throat, esophageal injury and discomfort. The patient voiced understanding and a consent form was signed.     * Plan for inpatient Left Atrial Appendage Occluder device tomorrow 5/7 per Dr. Partida. Will let Anesthesiology know that she an inpatient.   * She has not received Eliquis since admission 5/3 (Dr. Partida had requested a 2 day hold)       Ayesha Tejeda, NAYA    Interval History   Feeling 'much better' with less SOB.   No c/o CP, dizziness, lightheadedness    Physical Exam   Temp: 98.3  F (36.8  C) Temp src: Oral BP: 100/57 Pulse: 74   Resp: 18 SpO2: 92 % O2 Device: None (Room air)    Vitals:    05/04/19 0500 05/05/19 0524 05/06/19 0300   Weight: 74.4 kg (164 lb) 72.9 kg (160 lb 12.8 oz) 71.9 kg (158 lb 9.6 oz)     Vital Signs with Ranges  Temp:  [97.6  F (36.4  C)-98.5  F (36.9  C)] 98.3  F (36.8  C)  Pulse:  [69-74] 74  Resp:  [15-18] 18  BP: (100-124)/(57-71) 100/57  SpO2:  [92 %-96 %] 92 %  I/O last 3 completed shifts:  In: 600 [P.O.:600]  Out: 1650 [Urine:1650]    Telemetry:  with underlying AFib     Constitutional: Up in chair without tachypnea  Respiratory: Crackles throughout  Cardiovascular: Regular. Systolic murmur best at LLSB  Musculoskeletal: Chronic skin discoloration.     Medications     - MEDICATION INSTRUCTIONS -         fluticasone  2 spray Both Nostrils QPM     furosemide  40 mg Intravenous BID     metoprolol tartrate  25 mg Oral BID     pregabalin  75 mg Oral BID     ranitidine  150 mg Oral At Bedtime     simvastatin  10 mg Oral At Bedtime     sodium chloride (PF)  3 mL Intracatheter Q8H     spironolactone  25 mg Oral Daily     umeclidinium  1 puff Inhalation Daily       Data   I personally reviewed no images or EKG's today.  Results for orders placed or performed during the hospital encounter of 05/03/19 (from the past 24 hour(s))   Basic metabolic panel   Result Value Ref Range     Sodium 140 133 - 144 mmol/L    Potassium 3.9 3.4 - 5.3 mmol/L    Chloride 100 94 - 109 mmol/L    Carbon Dioxide 35 (H) 20 - 32 mmol/L    Anion Gap 5 3 - 14 mmol/L    Glucose 125 (H) 70 - 99 mg/dL    Urea Nitrogen 49 (H) 7 - 30 mg/dL    Creatinine 1.73 (H) 0.52 - 1.04 mg/dL    GFR Estimate 27 (L) >60 mL/min/[1.73_m2]    GFR Estimate If Black 31 (L) >60 mL/min/[1.73_m2]    Calcium 9.7 8.5 - 10.1 mg/dL     *Note: Due to a large number of results and/or encounters for the requested time period, some results have not been displayed. A complete set of results can be found in Results Review.

## 2019-05-06 NOTE — PROGRESS NOTES
[facility]  Hospitalist Progress Note    Assessment & Plan   Helene Gibbs is a 84 year old female who was admitted on 5/3/2019 with generalized weakness and shortness of breath.    Acute diastolic congestive heart failure exacerbation  Presents with generalized weakness, fatigue, increased shortness of breath using home oxygen throughout the day rather than just at night. NtpBNP 9289 on admission with a troponin of less than 0.015. Echocardiogram completed in February 2019 demonstrated moderately severe tricuspid regurgitation, severe pulmonary hypertension, and ejection fraction of 50 to 55% with severe biatrial enlargement.  - Cardiology consulted, appreciate assistance  - Furosemide IV reduced to BID per cardiology  - Daily weights, strict I/O  - Daily BMP while diuresing  - Continue spironolactone     Hypertension  - Blood pressures well-controlled  - Continue prior to admission metoprolol, spironolactone  - Diuresis as above      Hyperlipidemia  - Continue prior to admission simvastatin      Chronic atrial fibrillation status post ablation and pacemaker placement  - Continue prior to admission metoprolol   - EP consulted per cardiology. TTE today. Watchman tomorrow 5/7.  - Holding prior to admission apixaban, per cardiology note on 5/1/19 is to be held for 2 days prior to procedure      Chronic kidney disease, stage III-IV  Baseline creatinine variable, over past ~6 months ranging anywhere from 1.3 to 2.2. Creatinine 1.84 on admission.  Urinalysis on admission appears contaminated with 6 squamous cells, though still with only 3 WBC and 8 RBC  - Creatinine generally stable  - Daily BMP with diuresis  - Avoid nephrotoxins     Anemia, with history of colonic angioid ectasias  Patient with a hemoglobin of 9.5 on admission.  Denies signs of blood loss.  - Monitor.     Diabetes mellitus type 2  Prior to admission on glipizide. Hemoglobin A1c 6.3% 4/9/19  - Hold oral agents  - Blood sugars have  been well-controlled without need for insulin, will monitor blood sugars with BMP and otherwise defer insulin or frequent blood sugar checks     History of Bell's palsy  Stable.  - Continue prior to admission Lyrica.     COPD  Stable.  - Continue prior to admission inhalers.     GERD  Stable.  - Continue prior to admission ranitidine.     Pruritis  - Ordered prior to admission hydroxyzine      Constipation  - Continue prior to admission bisacodyl regimen     Orders Placed This Encounter      NPO per Anesthesia Guidelines for Procedure/Surgery Except for: Meds    DVT prophylaxis: Pneumatic Compression Devices, apixaban on hold  Code Status: DNR/DNI    Disposition: Expected discharge in 2-3 days after cardiac procedures complete, cardiac rehab.    Elodia Matias MD  [phone] (7am - 6pm)  Text Page  ~~~~~~~~~~~~~~~~~~~~~~~~~~~~~~~~~~~~~~~~~~~~~~~  Interval History   Resting this morning. Wants to eat but understands testing needed and NPO status. Afebrile. No palpitations, chest pain. Has chronic constipation.     -Data reviewed today: I reviewed all new labs and imaging results over the last 24 hours.    Physical Exam   Temp: 98.1  F (36.7  C) Temp src: Oral BP: 119/65 Pulse: 71   Resp: 16 SpO2: 93 % O2 Device: None (Room air)    Vitals:    05/04/19 0500 05/05/19 0524 05/06/19 0300   Weight: 74.4 kg (164 lb) 72.9 kg (160 lb 12.8 oz) 71.9 kg (158 lb 9.6 oz)     Vital Signs with Ranges  Temp:  [97.6  F (36.4  C)-98.5  F (36.9  C)] 98.1  F (36.7  C)  Pulse:  [69-74] 71  Resp:  [15-18] 16  BP: (100-124)/(57-71) 119/65  SpO2:  [90 %-96 %] 93 %  I/O last 3 completed shifts:  In: 600 [P.O.:600]  Out: 1650 [Urine:1650]    Constitutional: Alert, NAD, pleasant and interactive  HEENT: mmm, sclerae anicteric  Respiratory: Lungs CTAB, no wheezes or crackles  Cardiovascular: RRR, no murmurs  GI: soft, non-tender, nondistended  Skin/Integument: warm, dry, no acute rashes  Musc: MUNROE, normal limb strength x 4    Medications      - MEDICATION INSTRUCTIONS -       - MEDICATION INSTRUCTIONS -       - MEDICATION INSTRUCTIONS -         fluticasone  2 spray Both Nostrils QPM     furosemide  40 mg Intravenous BID     metoprolol tartrate  25 mg Oral BID     pregabalin  75 mg Oral BID     ranitidine  150 mg Oral At Bedtime     simvastatin  10 mg Oral At Bedtime     sodium chloride (PF)  3 mL Intravenous Q8H     sodium chloride (PF)  3 mL Intracatheter Q8H     spironolactone  25 mg Oral Daily     umeclidinium  1 puff Inhalation Daily       Data   Recent Labs   Lab 05/06/19  0543 05/05/19  0603 05/04/19  0615 05/03/19  1716   WBC  --   --  4.4 4.9   HGB  --   --  9.3* 9.5*   MCV  --   --  85 85   PLT  --   --  169 159    138 140 141   POTASSIUM 3.9 4.1 4.0 4.5   CHLORIDE 100 99 102 102   CO2 35* 35* 33* 33*   BUN 49* 48* 40* 40*   CR 1.73* 1.77* 1.75* 1.84*   ANIONGAP 5 4 5 6   GERARD 9.7 9.7 9.5 9.5   * 102* 65* 167*   ALBUMIN  --   --   --  3.3*   PROTTOTAL  --   --   --  7.4   BILITOTAL  --   --   --  0.6   ALKPHOS  --   --   --  73   ALT  --   --   --  14   AST  --   --   --  10   TROPI  --   --   --  <0.015       Imaging:  No results found for this or any previous visit (from the past 24 hour(s)).

## 2019-05-07 NOTE — OR NURSING
"Watchman procedure cancelled for today.  Pt feeling  weak from episode earlier. At this time she has productive cough with thick green phlegm. Afebrile.  Neuro's showed generalized weakness in upper extremities with pt stating new \"shaking\" of her arms. She is dozing on and off throughout this pre-op interview.  Dr Partida at bedside, Dr Olsen spoke to him previously re: pt's current health and general anesthesia outcome.  It was agreed that this is an elective procedure and she needs to be  optimized for a postive outcome.  Report given to floor   "

## 2019-05-07 NOTE — PROVIDER NOTIFICATION
Patient c/o feeling light-headed and shaky this AM, required assist of 2 to return from bathroom (SBA with walker previously). , /61, HR 69, o2 96% on 2L NC. Hx anemia with previous transfusions, pt questioning if hgb is low. Last hgb 9.3 on 5/4, MD paged to inquire if hgb can be ordered this AM.

## 2019-05-07 NOTE — PLAN OF CARE
A/O, VSS on 2L o2 via nasal cannula. Denies pain. Up with SBA & walker. Tele vpaced. Lung sounds diminished to JERONIMO/LLL, crackles to RML/RLL/LLL. Murmur noted. Trace BLE edema. Diuresing with IV Lasix. TTE negative for clot, plan for Watchman procedure today, Eliquis on hold since 5/3.

## 2019-05-07 NOTE — TELEPHONE ENCOUNTER
[employer] (S)  PPM Device Check    ---rep check while in patient for watchman / overdue for device check ---    : 99 %  Mode: VVIR 70          Presenting Rhythm:   Heart Rate: not included   Sensing: WNL      Pacing Threshold: WNL      Impedance: WNL  Battery Status: 7-8 years longevity remaining   Ventricular Arrhythmia: none   Care Plan: order placed for 3 month teletrace.

## 2019-05-07 NOTE — PROGRESS NOTES
[facility]  Hospitalist Progress Note    Assessment & Plan   Helene Gibbs is a 84 year old female who was admitted on 5/3/2019 with generalized weakness and shortness of breath.    Acute diastolic congestive heart failure exacerbation  Presents with generalized weakness, fatigue, increased shortness of breath using home oxygen throughout the day rather than just at night. NtpBNP 9289 on admission with a troponin of less than 0.015. Echocardiogram completed in February 2019 demonstrated moderately severe tricuspid regurgitation, severe pulmonary hypertension, and ejection fraction of 50 to 55% with severe biatrial enlargement.  - Cardiology consulted, appreciate assistance  - Furosemide per cardiology (held today)  - Daily weights, strict I/O  - Daily BMP while diuresing  - Continue spironolactone    Chronic atrial fibrillation status post ablation and pacemaker placement  Anticoagulation contraindicated due to colonic angioid angectasias and GI bleeding history.  - Continue prior to admission metoprolol   - EP consulted per cardiology. TTE done 5/6. Watchman was scheduled for 5/7 but cancelled due to patient weakness. Per chart notes appears this will be deferred to outpatient setting.  - Holding prior to admission apixaban, per cardiology note on 5/1/19 is to be held for 2 days prior to procedure      COPD  Productive cough 5//7. Possible exacerbation vs wet cough from heart failure.   - Continue prior to admission inhalers.  -wean supplemental oxygen  -chest xray 2 view today  -start doxycycline for possible COPD exacerbation    Generalized Weakness  Acute on chronic weakness (admitting chief complaint was weakness). +dysmetria of upper extremities. No focal deficits.   -non-contrast head CT to check for acute stroke. Negative for stroke but diffuse volume loss noted.   -PT and OT ordered    Hypertension  Blood pressures remain well-controlled.  - Continue prior to admission metoprolol,  "spironolactone  - Diuresis as above      Hyperlipidemia  - Continue prior to admission simvastatin      Chronic kidney disease, stage III-IV  Baseline creatinine variable, over past ~6 months ranging anywhere from 1.3 to 2.2. Creatinine 1.84 on admission.  Urinalysis on admission appears contaminated with 6 squamous cells, though still with only 3 WBC and 8 RBC  - Daily BMP with diuresis  - Avoid nephrotoxins  Recent Labs   Lab 05/07/19  0629 05/06/19  0543 05/05/19  0603   CR 1.77* 1.73* 1.77*     Anemia, with history of colonic angioid ectasias  Patient with a hemoglobin of 9.5 on admission.  Denies signs of blood loss.   - Monitor.     Diabetes mellitus type 2  Prior to admission on glipizide. Hemoglobin A1c 6.3% 4/9/19  - Hold oral agents  - Blood sugars have been well-controlled without need for insulin, will monitor blood sugars with BMP and otherwise defer insulin or frequent blood sugar checks     History of Bell's palsy  Stable.  - Continue prior to admission Lyrica.     GERD  - Continue prior to admission ranitidine.     Pruritis  - Ordered prior to admission hydroxyzine      Constipation  - Continue prior to admission bisacodyl regimen     DVT prophylaxis: Pneumatic Compression Devices and Ambulate every shift  Code Status: DNR/DNI    Disposition: Expected discharge to home in     Elodia Matias MD  [phone] (7am - 6pm)  Text Page  ~~~~~~~~~~~~~~~~~~~~~~~~~~~~~~~~~~~~~~~~~~~~~~~  Interval History   Per nursing report patient weak while in the bathroom earlier today.   Watchman device placement cancelled due to patient feeling too weak and noted to have cough. She admits to only using her inhalers \"once in a while\" at home. Doesn't recall any recent ill contacts. Has baseline Bell's palsy. Arms and legs feel \"shaky\" but strength is good in bilateral UE and LE. + productive cough.    -Data reviewed today: I reviewed all new labs and imaging results over the last 24 hours. I personally reviewed the " telemetry showing paced rhythm.    Physical Exam   Temp: 98.7  F (37.1  C) Temp src: Oral BP: 122/63 Pulse: 73 Heart Rate: 69 Resp: 22 SpO2: 96 % O2 Device: Nasal cannula Oxygen Delivery: 2 LPM  Vitals:    05/05/19 0524 05/06/19 0300 05/07/19 0515   Weight: 72.9 kg (160 lb 12.8 oz) 71.9 kg (158 lb 9.6 oz) 70.5 kg (155 lb 6 oz)     Vital Signs with Ranges  Temp:  [97.7  F (36.5  C)-98.7  F (37.1  C)] 98.7  F (37.1  C)  Pulse:  [69-76] 73  Heart Rate:  [69-71] 69  Resp:  [12-22] 22  BP: ()/(46-77) 122/63  FiO2 (%):  [2 %-4 %] 2 %  SpO2:  [88 %-100 %] 96 %  I/O last 3 completed shifts:  In: 120 [P.O.:120]  Out: 1550 [Urine:1550]    Constitutional: Propped up in bed, NAD, pleasant and interactive  HEENT: mmm, sclerae anicteric  Respiratory: Lungs CTAB, no wheezes or crackles  Cardiovascular: RRR, no murmurs  trace LE edema  GI: soft, non-tender, nondistended  Skin/Integument: warm, dry, no acute rashes  Musc: MUNROE, normal limb strength x 4, right shoulder ROM limited by tendon pain  Neuro: AOx3, no tremors or focal deficits, speech fluent, +dysmetria of upper extremities  Psych: not anxious, mildly confused     Medications     - MEDICATION INSTRUCTIONS -         clindamycin  900 mg Intravenous Once     fluticasone  2 spray Both Nostrils QPM     metoprolol tartrate  25 mg Oral BID     pregabalin  75 mg Oral BID     ranitidine  150 mg Oral At Bedtime     simvastatin  10 mg Oral At Bedtime     spironolactone  25 mg Oral Daily     umeclidinium  1 puff Inhalation Daily       Data   Recent Labs   Lab 05/07/19  0651 05/07/19  0629 05/06/19  0543 05/05/19  0603 05/04/19  0615 05/03/19  1716   WBC 10.3  --   --   --  4.4 4.9   HGB 10.6*  --   --   --  9.3* 9.5*   MCV 83  --   --   --  85 85     --   --   --  169 159   INR 1.17*  --   --   --   --   --    NA  --  140 140 138 140 141   POTASSIUM  --  4.2 3.9 4.1 4.0 4.5   CHLORIDE  --  99 100 99 102 102   CO2  --  34* 35* 35* 33* 33*   BUN  --  45* 49* 48* 40* 40*   CR   --  1.77* 1.73* 1.77* 1.75* 1.84*   ANIONGAP  --  7 5 4 5 6   GERARD  --  9.7 9.7 9.7 9.5 9.5   GLC  --  161* 125* 102* 65* 167*   ALBUMIN  --   --   --   --   --  3.3*   PROTTOTAL  --   --   --   --   --  7.4   BILITOTAL  --   --   --   --   --  0.6   ALKPHOS  --   --   --   --   --  73   ALT  --   --   --   --   --  14   AST  --   --   --   --   --  10   TROPI  --   --   --   --   --  <0.015       Imaging:  No results found for this or any previous visit (from the past 24 hour(s)).

## 2019-05-07 NOTE — PROGRESS NOTES
[facility]  Cardiology Progress Note  Date of Service: 05/07/2019      Assessment & Plan   Helene Gibbs is a 84 year old female with h/o permanent atrial fibrillation s/p AVN ablation and PPM 2012, medical mgmt for CAD, angiectasias with severe anemia with plans for left atrial appendage occluder device planned to be done as an outpatient on 5/7 and HFpEF with moderate RV dysfunction,  who was admitted on 5/3/2019 due to generalized weakness and SOB.  She was started on diuretic therapy for HFpEF.      Assessment:  1.  Acute on diastolic heart failure, with decreased rv function and pulm htn   - admit wt 165, current wt 155, dry wt 151   - cxr 5/7 appears congested with L pleural effusion    2.  Afib with PPM in place not in anticoagulation due to anemia   - initally planned for watchman device 5/7 am but postponed due to weakness   - per EP    3.  CKD, cr stable here ~1.8          Plan:   1. Lasix 80 mg IV x1 now- had initially planned to change to po today, but CXR looks congested per my read.    2. nt probnp in am  3. Will continue to follow with you    Tina Browning PA-C  [facility]  Pager: [phone]     Interval History   Feels poorly, weak, coughing, no fevers.  No cp.  States breathing is ok.  Feels worse than yesterday, worsened overnight.      Physical Exam   Temp: 98.7  F (37.1  C) Temp src: Oral BP: 122/63 Pulse: 73 Heart Rate: 69 Resp: 22 SpO2: 96 % O2 Device: Nasal cannula Oxygen Delivery: 2 LPM  Vitals:    05/05/19 0524 05/06/19 0300 05/07/19 0515   Weight: 72.9 kg (160 lb 12.8 oz) 71.9 kg (158 lb 9.6 oz) 70.5 kg (155 lb 6 oz)       Constitutional   Chronically ill appearing, falls asleep easily while talking.    Skin   pale  Neck  4 cm JVP above sternal notch  Lungs bibasilar crackles, no wheeezes  Cardiac rrr, no murmur or rub/  Abdomen   abdomen soft, bowel sounds normoactive, no hepatosplenomegaly  Extremities and Back   1+ edema.      Medications     - MEDICATION INSTRUCTIONS -          clindamycin  900 mg Intravenous Once     doxycycline hyclate  50 mg Oral Q12H SG     fluticasone  2 spray Both Nostrils QPM     [START ON 5/8/2019] furosemide  40 mg Intravenous Q12H     furosemide  80 mg Intravenous Once     metoprolol tartrate  25 mg Oral BID     pregabalin  75 mg Oral BID     ranitidine  150 mg Oral At Bedtime     simvastatin  10 mg Oral At Bedtime     spironolactone  25 mg Oral Daily     umeclidinium  1 puff Inhalation Daily       Data   Last 24 hours labs reviewed       Tele: paced

## 2019-05-07 NOTE — PROGRESS NOTES
Discussed with Dr. Partida, who met with Helene in pre-op.  She does not feel ready for the procedure today and therefore will not be done.    Can possibly reschedule as OP.    EP will sign off.    General Cardiology will continue to manage R sided heart failure.

## 2019-05-07 NOTE — PROGRESS NOTES
[facility]    EP Progress Note    Date of Service (when I saw the patient): 05/07/2019     Assessment & Plan   Helene Gibbs is a 84 year old female with h/o permanent atrial fibrillation s/p AVN ablation and PPM 2012, medical mgmt for CAD, angiectasias with severe anemia with plans for left atrial appendage occluder device planned to be done as an outpatient on 5/7 and HFpEF with moderate RV dysfunction,  who was admitted on 5/3/2019 due to generalized weakness and SOB.  She was started on diuretic therapy for HFpEF.      1. HFpEF -   - Echo 2/2019 with EF 50-55%. RV with moderate dilation and moderately reduced RVSF. 3+ TR and RVSP 56 mmHg. JOAN 5/6 showed 3-4+ TR with non-coaptation of TV leaflets with severe RV dilation and mild reduction in RV function    - PTA on spironolactone 25 and torsemide 20 mg daily. Here, got Lasix 20 mg IV 5/3, 120 mg 5/4, 80 5/5 and 80 mg 5/6.    - Following with Dr. Sheth this admission  - I/Os negative 5.2 L (down another 1.4 L c/w yesterday) and weight down another 1.4 c/w yesterday (4.5 since admit).    - Cr stable. Electrolytes stable  - O2 sats stable 95% on 2L  - Exam better; still with JVD and HJR but lungs now with crackles on L only.     PLAN:   * Per General Cardiology (Dr. Sheth)   * Continues to improve    2. Permanent atrial fibrillation with colonic angiectasias -   - Hgb stable this admission >9.0 g/dL. Had been in the 7s 2/2019  - Trying to avoid long term AC but note CHADSVASc 6 (CHF, HTN, CAD, age, sex)     - JOAN done 5/6 showed no clot in MISAEL with spontaneous contrast noted.     * MISAEL diameter x depth (cm) at 45 degrees is: 1.7 x 2.3.   * MISAEL diameter x depth (cm) at 90 degrees is: 1.9 x 2.3.    * MISAEL diameter x depth (cm) at 135 degrees is: 1.9 x 3.    * MISEAL diameter x depth (cm) at 0 degrees is: 1.7 x 3.2.      - Eliquis held since admission 5/3 (Dr. Partida had asked for 2 day hold)                PLAN:              * Plan for inpatient Left  "Atrial Appendage Occluder device today 5/7 per Dr. Partida. Consent signed after re-review of R/B/I of Watchman procedure yesterday, including use of AC and DAPT following procedure, risk of cardiac puncture/tamponade, peripheral vessel injury, infection, device migration and stroke.     3. \"Shaky episode\"  - Noted last night while up to the bathroom. Legs felt weak and unsteady. This morning, still feels a little \"off.\"  - Hgb up to 10.6, Glucose >100, electrolytes this AM OK. Vitals at time of procedure were   - Per RN, no focal neuro deficits  - She denied feeling anxious, lightheadedness. Denied palpitations/chest discomfort or worsening SOB associated with this.     PLAN:   * Pt is reassured by normal neuro exam (per RN) and my normal strength exam   * Reviewed labs that looked OK    * Dr. Partida alerted      Ayesha Tejeda, PA-C    Interval History   Shaky episode, as above  No c/o CP  Breathing \"better\"    Physical Exam   Temp: 98.3  F (36.8  C) Temp src: Oral BP: 115/72 Pulse: 69 Heart Rate: 69 Resp: 18 SpO2: 95 % O2 Device: Nasal cannula Oxygen Delivery: 2 LPM  Vitals:    05/05/19 0524 05/06/19 0300 05/07/19 0515   Weight: 72.9 kg (160 lb 12.8 oz) 71.9 kg (158 lb 9.6 oz) 70.5 kg (155 lb 6 oz)     Vital Signs with Ranges  Temp:  [97.7  F (36.5  C)-98.7  F (37.1  C)] 98.3  F (36.8  C)  Pulse:  [69-76] 69  Heart Rate:  [69-71] 69  Resp:  [12-18] 18  BP: ()/(46-77) 115/72  FiO2 (%):  [2 %-4 %] 2 %  SpO2:  [88 %-100 %] 95 %  I/O last 3 completed shifts:  In: 120 [P.O.:120]  Out: 1550 [Urine:1550]    Telemetry:     Constitutional: awake, alert, cooperative, no apparent distress, and appears stated age  Respiratory: LLL crackle. Otherwise clear after cough  Cardiovascular: Regular. Murmur at RLSB and LLSB. JVD to mid-neck  Musculoskeletal: Chronic venous stasis changes. 1+ edema    Medications     - MEDICATION INSTRUCTIONS -       sodium chloride 1,000 mL (05/07/19 0654)       clindamycin  900 mg " Intravenous Once     fluticasone  2 spray Both Nostrils QPM     furosemide  40 mg Intravenous BID     metoprolol tartrate  25 mg Oral BID     pregabalin  75 mg Oral BID     ranitidine  150 mg Oral At Bedtime     simvastatin  10 mg Oral At Bedtime     sodium chloride (PF)  3 mL Intracatheter Q8H     spironolactone  25 mg Oral Daily     umeclidinium  1 puff Inhalation Daily       Data   I personally reviewed no images or EKG's today.  Results for orders placed or performed during the hospital encounter of 19 (from the past 24 hour(s))   Transesophageal Echocardiogram    Narrative    [id number]  [facility]  [id number]  [id number]^REBECCA^LAINEY^ZO           [facility]  Echocardiography Laboratory  [address] [place] [address]        Name: PATRICIA BOBO  MRN: [id number]  : 1935  Study Date: 2019 01:18 PM  Age: 84 yrs  Gender: Female  Patient Location: [facility]  Reason For Study: Afib  Ordering Physician: LAINEY FERNANDEZ  Referring Physician: Neisha Esparza  Performed By: Lino Gooden RDCS     BSA: 1.8 m2  Height: 65 in  Weight: 158 lb  HR: 73  BP: 101/65 mmHg  _____________________________________________________________________________  __        Procedure  Complete JOAN Adult. 3D image acquisition, reconstruction, and real-time  interpretation was performed.  _____________________________________________________________________________  __        Interpretation Summary     No thrombus is detected in the left atrial appendage.  Spontaneous contrast in left atrial appendage.  MISAEL measurements noted below.  The right ventricle is severely dilated.  The right ventricular systolic function is mildly reduced.  There is mod-severe to severe (3-4+) tricuspid regurgitation.  RVSP 26 plus RAP.  3D evaluation of TV reveals central jet related to non coaptation of the  leaflets. The study was technically adequate. There is no comparison  study  available.  _____________________________________________________________________________  __        JOAN  I determined this patient to be an appropriate candidate for the planned  sedation and procedure and have reassessed the patient immediately prior to  sedation and procedure. Total sedation time: 30 minutes of continuous bedside  1:1 monitoring. Versed (1.5mg) was given intravenously. Fentanyl (30mcg) was  given intravenously. The transesophageal probe was passed without difficulty.  Prior to the exam, the oral cavity was checked and no overcrowding was noted.  Consent to the procedure was obtained prior to sedation. There were no  complications associated with this procedure.     Left Ventricle  The left ventricle is normal in size. Left ventricular systolic function is  normal. The visual ejection fraction is estimated at 55-60%. Septal motion is  consistent with conduction abnormality. Flattened septum consistent with right  ventricular volume overload.     Right Ventricle  The right ventricle is severely dilated. The right ventricular systolic  function is mildly reduced.     Atria  The left atrium is severely dilated. Spontaneous contrast in left atrium. The  right atrium is severely dilated. RA much larger than LA. There is no color  Doppler evidence of an atrial shunt. A contrast injection (Bubble Study) was  performed that was negative for flow across the interatrial septum.  Spontaneous contrast in left atrial appendage. No thrombus is detected in the  left atrial appendage. MISAEL diameter x depth (cm) at 45 degrees is: 1.7 x 2.3.  MISAEL diameter x depth (cm) at 90 degrees is: 1.9 x 2.3. MISAEL diameter x depth  (cm) at 135 degrees is: 1.9 x 3. MISAEL diameter x depth (cm) at 0 degrees is:  1.7 x 3.2.        Mitral Valve  The mitral valve leaflets are mildly thickened. There is mild to moderate  mitral annular calcification. There is mild to moderate (1-2+) mitral  regurgitation.     Tricuspid Valve  There  is mod-severe to severe (3-4+) tricuspid regurgitation. The right  ventricular systolic pressure is approximated at 25.8 mmHg plus the right  atrial pressure.     Aortic Valve  The aortic valve is trileaflet. There is mild trileaflet aortic sclerosis. No  aortic regurgitation is present. No aortic stenosis is present.     Pulmonic Valve  There is trace to mild pulmonic valvular regurgitation.     Vessels  The aortic root is normal size.        Pericardial/Pleural  There is no pericardial effusion.     Rhythm  The rhythm was paced.  _____________________________________________________________________________  __           Doppler Measurements & Calculations  TV max P.8 mmHg  TR max batool: 253.9 cm/sec  TR max P.8 mmHg           _____________________________________________________________________________  __           Report approved by: Hamlet Reyes 2019 04:28 PM      Glucose by meter   Result Value Ref Range    Glucose 134 (H) 70 - 99 mg/dL   Basic metabolic panel   Result Value Ref Range    Sodium 140 133 - 144 mmol/L    Potassium 4.2 3.4 - 5.3 mmol/L    Chloride 99 94 - 109 mmol/L    Carbon Dioxide 34 (H) 20 - 32 mmol/L    Anion Gap 7 3 - 14 mmol/L    Glucose 161 (H) 70 - 99 mg/dL    Urea Nitrogen 45 (H) 7 - 30 mg/dL    Creatinine 1.77 (H) 0.52 - 1.04 mg/dL    GFR Estimate 26 (L) >60 mL/min/[1.73_m2]    GFR Estimate If Black 30 (L) >60 mL/min/[1.73_m2]    Calcium 9.7 8.5 - 10.1 mg/dL   CBC with platelets   Result Value Ref Range    WBC 10.3 4.0 - 11.0 10e9/L    RBC Count 4.13 3.8 - 5.2 10e12/L    Hemoglobin 10.6 (L) 11.7 - 15.7 g/dL    Hematocrit 34.2 (L) 35.0 - 47.0 %    MCV 83 78 - 100 fl    MCH 25.7 (L) 26.5 - 33.0 pg    MCHC 31.0 (L) 31.5 - 36.5 g/dL    RDW 19.2 (H) 10.0 - 15.0 %    Platelet Count 164 150 - 450 10e9/L   INR   Result Value Ref Range    INR 1.17 (H) 0.86 - 1.14   Partial thromboplastin time   Result Value Ref Range    PTT 27 22 - 37 sec   ABO/Rh type and screen   Result  Value Ref Range    ABO O     RH(D) Pos     Antibody Screen Neg     Test Valid Only At [facility]        Specimen Expires 05/10/2019      *Note: Due to a large number of results and/or encounters for the requested time period, some results have not been displayed. A complete set of results can be found in Results Review.

## 2019-05-07 NOTE — ANESTHESIA PREPROCEDURE EVALUATION
Anesthesia Pre-Procedure Evaluation    Patient: Helene Gibbs   MRN: [id number] : 1935          Preoperative Diagnosis: permament atrial fibrillation    Procedure(s):  EP Left Atrial Appendage Closure    Past Medical History:   Diagnosis Date     Anemia      Ankle arthritis      Arthritis      Back pain      Bell's palsy     left     Breast CA (H) -    left lumpectomy, radiation, -recurrence     Breast cancer (H)     Left breast lumpectomy w LN disection, and radiation therapy     CKD (chronic kidney disease)     stage 3 baseline Cr 1.3     Colon polyps     colonoscopy--next due in      Congestive heart failure (H)      COPD (chronic obstructive pulmonary disease) (H)      Coronary artery disease      DM (diabetes mellitus) (H)      GERD (gastroesophageal reflux disease)      Hyperlipidemia      Hypertension      Lumbar spinal stenosis     use cane     Nicotine dependence      Obesity      Osteoporosis      Other chronic pain      Pacemaker      Permanent atrial fibrillation (H) 2008    S/P AV node ablation and pacemaker 10-25-12       Pleural effusion      Pulmonary hypertension (H)      PVD (peripheral vascular disease) (H)      Rectal stenosis      Tobacco abuse     current     Tricuspid regurgitation      Past Surgical History:   Procedure Laterality Date     ARTHROPLASTY HIP Left 10/20/2018    Procedure: LEFT HIP HERACLIO-ARTHROPLASTY ;  Surgeon: Ish Fish MD;  Location:  OR     ARTHROSCOPY SHOULDER RT/LT  ,     Bilateral, right then left     BONE MARROW BIOPSY, BONE SPECIMEN, NEEDLE/TROCAR N/A 2017    Procedure: BIOPSY BONE MARROW;  BONE MARROW BIOPSY;  Surgeon: Marino Cohen MD;  Location:  GI     C DEXA, BONE DENSITY, AXIAL SKEL       C MAMMOGRAM, SCREENING  2009     COLONOSCOPY N/A 10/7/2016    Procedure: COMBINED COLONOSCOPY, SINGLE OR MULTIPLE BIOPSY/POLYPECTOMY BY BIOPSY;  Surgeon: Cassandra Mccord MD;  Location:  GI     COLONOSCOPY N/A  2/15/2019    Procedure: COLONOSCOPY;  Surgeon: Juan Pablo Hayden DO;  Location:  GI     ESOPHAGOSCOPY, GASTROSCOPY, DUODENOSCOPY (EGD), COMBINED N/A 8/10/2017    Procedure: COMBINED ESOPHAGOSCOPY, GASTROSCOPY, DUODENOSCOPY (EGD), BIOPSY SINGLE OR MULTIPLE;  gastroscopy;  Surgeon: Helene Bustamante MD;  Location:  GI     ESOPHAGOSCOPY, GASTROSCOPY, DUODENOSCOPY (EGD), COMBINED N/A 2/15/2019    Procedure: COMBINED ESOPHAGOSCOPY, GASTROSCOPY, DUODENOSCOPY (EGD);  Surgeon: Juan Pablo Hayden DO;  Location:  GI     H ABLATION AV NODE  10/2012     HC COLONOSCOPY THRU STOMA, DIAGNOSTIC  ? 2005     IMPLANT PACEMAKER  10/2012    [place] [id number], [id number]     IRRIGATION AND DEBRIDEMENT LOWER EXTREMITY, COMBINED Left 12/21/2018    Procedure: INCISION AND DRAINAGE OF LEFT HIP WITH EXCISION OF BONE FRAGMENT;  Surgeon: Ish Fish MD;  Location:  OR     JOINT REPLACEMTN, KNEE RT/LT      Joint Replacement knee bilateral     LAPAROSCOPIC CHOLECYSTECTOMY WITH CHOLANGIOGRAMS N/A 12/14/2015    Procedure: LAPAROSCOPIC CHOLECYSTECTOMY WITH CHOLANGIOGRAMS;  Surgeon: Ervin Amos MD;  Location:  OR     LUMPECTOMY BREAST      Left-2004     PHACOEMULSIFICATION CLEAR CORNEA WITH STANDARD INTRAOCULAR LENS IMPLANT Left 7/16/2015    Procedure: PHACOEMULSIFICATION CLEAR CORNEA WITH STANDARD INTRAOCULAR LENS IMPLANT;  Surgeon: Sai Obregon MD;  Location:  EC     PHACOEMULSIFICATION CLEAR CORNEA WITH TORIC INTRAOCULAR LENS IMPLANT Right 5/9/2017    Procedure: PHACOEMULSIFICATION CLEAR CORNEA WITH TORIC INTRAOCULAR LENS IMPLANT;  RIGHT EYE PHACOEMULSIFICATION CLEAR CORNEA WITH TORIC INTRAOCULAR LENS IMPLANT ;  Surgeon: Duc Richmond MD;  Location:  EC       Anesthesia Evaluation     . Pt has had prior anesthetic. Type: General    No history of anesthetic complications          ROS/MED HX    ENT/Pulmonary:     (+)COPD, O2 dependent, , . Other pulmonary disease acute HF exacerbation with Lasix therapy.   (-)  tobacco use, asthma and sleep apnea   Neurologic:     (+)other neuro ([place] Palsy)     Cardiovascular: Comment: Reading Physician Reading Date Result Priority  Shaq Bailey MD 2019     Narrative    [id number]  [facility]  [id number]  [id number]^REBECCA^LAINEY^ZO           [facility]  Echocardiography Laboratory  [address] [place] [address]        Name: PATRICIA BOBO  MRN: [id number]  : 1935  Study Date: 2019 01:18 PM  Age: 84 yrs  Gender: Female  Patient Location: [facility]  Reason For Study: Afib  Ordering Physician: LAINEY FERNANDEZ  Referring Physician: Neisha Esparza  Performed By: Lino Gooden RDCS     BSA: 1.8 m2  Height: 65 in  Weight: 158 lb  HR: 73  BP: 101/65 mmHg  _____________________________________________________________________________  __        Procedure  Complete JOAN Adult. 3D image acquisition, reconstruction, and real-time  interpretation was performed.  _____________________________________________________________________________  __        Interpretation Summary     No thrombus is detected in the left atrial appendage.  Spontaneous contrast in left atrial appendage.  MISAEL measurements noted below.  The right ventricle is severely dilated.  The right ventricular systolic function is mildly reduced.  There is mod-severe to severe (3-4+) tricuspid regurgitation.  RVSP 26 plus RAP.  3D evaluation of TV reveals central jet related to non coaptation of the  leaflets. The study was technically adequate. There is no comparison study  available.  _____________________________________________________________________________  __        (+) hypertension-Peripheral Vascular Disease-CAD, --. Taking blood thinners Pt has received instructions: . CHF etiology: HFpEF . POND, . pacemaker :- Patient is dependent on pacemaker . dysrhythmias a-fib, . pulmonary hypertension,      (-) dyslipidemia   METS/Exercise Tolerance:     Hematologic:     (+) Anemia, -  "     Musculoskeletal:         GI/Hepatic:     (+) GERD       Renal/Genitourinary:     (+) chronic renal disease, type: CRI,       Endo:     (+) type II DM Obesity, .      Psychiatric:         Infectious Disease:         Malignancy:         Other:                          Physical Exam      Airway     Dental     Cardiovascular       Pulmonary (+) wheezes       Other findings: Bilateral crackles, left side high pitched wheeze.         Lab Results   Component Value Date    WBC 10.3 05/07/2019    HGB 10.6 (L) 05/07/2019    HCT 34.2 (L) 05/07/2019     05/07/2019    CRP 30.8 (H) 12/25/2018    SED 48 (H) 12/25/2018     05/07/2019    POTASSIUM 4.2 05/07/2019    CHLORIDE 99 05/07/2019    CO2 34 (H) 05/07/2019    BUN 45 (H) 05/07/2019    CR 1.77 (H) 05/07/2019     (H) 05/07/2019    GERARD 9.7 05/07/2019    PHOS 4.0 12/25/2018    MAG 2.7 (H) 02/16/2019    ALBUMIN 3.3 (L) 05/03/2019    PROTTOTAL 7.4 05/03/2019    ALT 14 05/03/2019    AST 10 05/03/2019    ALKPHOS 73 05/03/2019    BILITOTAL 0.6 05/03/2019    LIPASE 78 07/18/2018    PTT 27 05/07/2019    INR 1.17 (H) 05/07/2019    TSH 3.62 12/19/2018    T4 1.34 10/01/2009       Preop Vitals  BP Readings from Last 3 Encounters:   05/07/19 115/72   05/01/19 132/79   04/09/19 114/70    Pulse Readings from Last 3 Encounters:   05/07/19 69   05/01/19 72   04/09/19 72      Resp Readings from Last 3 Encounters:   05/07/19 18   02/21/19 16   02/11/19 18    SpO2 Readings from Last 3 Encounters:   05/07/19 95%   05/01/19 94%   04/09/19 94%      Temp Readings from Last 1 Encounters:   05/07/19 36.8  C (98.3  F) (Oral)    Ht Readings from Last 1 Encounters:   05/01/19 1.651 m (5' 5\")      Wt Readings from Last 1 Encounters:   05/07/19 70.5 kg (155 lb 6 oz)    Estimated body mass index is 25.86 kg/m  as calculated from the following:    Height as of 5/1/19: 1.651 m (5' 5\").    Weight as of this encounter: 70.5 kg (155 lb 6 oz).       Anesthesia Plan      History & Physical " Review  History and physical reviewed and following examination, relevant changes include: New onset thick sputum and cough. She feels uneasy and sick. Her arms and legs feel weak and she shakes when she is asked  her hands. I asked for a further work up prior to undergoing anesthesia.     ASA Status:  3 .        Plan for General and ETT with Intravenous induction. Maintenance will be Inhalation.    PONV prophylaxis:  Ondansetron (or other 5HT-3)       Postoperative Care  Postoperative pain management:  Multi-modal analgesia.      Consents                 Roderick Olsen MD

## 2019-05-07 NOTE — PLAN OF CARE
"Discharge Planner PT   Patient plan for discharge: Home  Current status: Min A sup>sit. Partial sit<>stand, close SBA. Sit>sup Min A. Completed seated exercises at EOB. Pt states she feels \"shaky\" and that her knees are \"buckling\" today, therefore, she declined ambulation.  Barriers to return to prior living situation: None.   Recommendations for discharge: Home with home with home PT.  Rationale for recommendations: Pt is a little weaker today, anticipate home at discharge with continued PT services for strength and activity tolerance benefits. However, if she continues to require assist with mobility, would benefit from TCU placement.        Entered by: Chacha Church 05/07/2019 5:12 PM       "

## 2019-05-08 NOTE — PROGRESS NOTES
SPIRITUAL HEALTH SERVICES Progress Note  [facility]    Initial visit per LOS.  Pt recalled visiting with SH before, and we discovered that this occurred here in 2012.  We reviewed some of that history including pt's work in [place], her marriage and the death of her spouse in 2001.  Re: her health, pt shared details about this hospitalization and the likely prolonged course of treatment and healing ahead of her.  Pt endorses having a resilient spirit, and said she is happy to focus her love energy on her small family.  SH affirmed pt's realism, acceptance, resilience and focus; and provided emotional/spiritual support and prayer.                                                                                                                                             Ruel Galvan M.Div., [facility]  Staff   Pager [phone]

## 2019-05-08 NOTE — PLAN OF CARE
[phone]. A&Ox4. VSS on 1-2 L NC, continue to wean O2. Pt. Felt weaker/fatigued in the AM, LS coarse crackles and diminished, productive cough, neuros intact other than weakness in extremities, watchman canceled, EP follow up outpt. CT of head completed, negative for acute changes, Chest x-ray completed, on IV lasix. UA neg. Up with 1 GB/walker to [facility]. Given tylenol for headache with relief. Continue to monitor.

## 2019-05-08 NOTE — PLAN OF CARE
"Discharge Planner OT   Patient plan for discharge: TCU   Current status: OT eval completed and treatment initiated on 83yo female admitted with SOB, CHF/COPD exacerbation, planned Watchman's procedure which was cancelled due to onset BUE \"shakiness\" and wet cough thought to be due to community-acquired pneumonia. Head CT negative for stroke. Today patient demonstrated indep with sup<>sit, CGA for sit-stand, and Min A of 2 to amb around bed (refused amb to bathroom as very fearful of falling stating \"my legs are giving out\". Pt on 3L 02 and 02 dropped to 89% during activity but once cued to initiate deep breathing and seated able to recovery fairly quickly to 92-93%. OT to see daily to advance ADLs.   Barriers to return to prior living situation: current level of A, lives alone  Recommendations for discharge: TCU  Rationale for recommendations: pt in agreement with TCU recommendation and requests to go to [place] and will pay for private room       Entered by: Vince Souza 05/08/2019 4:06 PM      "

## 2019-05-08 NOTE — PLAN OF CARE
VSS.  Oxygen 89-92% on 1L O2.  Decreased urine output, bladder scanned for 415 and straight cathed for 525.  Pt dropping items frequently due to hands shaking and also when standing at the side of the bed her knees buckled and she was very frustrated.  Neuro's intact, unable to hold up right arm as much due to an injured rotator cuff.  Lung sounds with expiratory wheezes and crackles in bases.  C/O pain to back and received tramadol.  Tele 100% V-paced.  Will continue with current plan of care.

## 2019-05-08 NOTE — PROGRESS NOTES
[facility] [place]  Hospitalist Progress Note    Assessment & Plan   Helene Gibbs is a 84 year old female who was admitted on 5/3/2019 with generalized weakness and shortness of breath.     Acute diastolic congestive heart failure exacerbation  Presents with generalized weakness, fatigue, increased shortness of breath using home oxygen throughout the day rather than just at night. NtpBNP 9289 on admission with a troponin of less than 0.015. Echocardiogram completed in February 2019 demonstrated moderately severe tricuspid regurgitation, severe pulmonary hypertension, and ejection fraction of 50 to 55% with severe biatrial enlargement.  - Cardiology consulted, appreciate assistance  - Furosemide per cardiology (held today)  - Daily weights, strict I/O  - Daily BMP while diuresing  - Continue spironolactone     Chronic atrial fibrillation status post ablation and pacemaker placement  Anticoagulation contraindicated due to colonic angioid angectasias and GI bleeding history.  - Continue prior to admission metoprolol   - EP consulted per cardiology. TTE done 5/6. Watchman was scheduled for 5/7 but cancelled due to patient weakness. This procedure will be deferred to outpatient setting at a future date.  - Holding prior to admission apixaban, per cardiology note on 5/1/19 is to be held for 2 days prior to procedure      COPD  Healthcare Acquired Pneumonia, probable  Severe kyphosis with restrictive lung disease  Productive cough 5//7. Possible COPD exacerbation vs wet cough from heart failure vs pneumonia (new left mid lung infiltrate on CXR from 5/7).   -Continue prior to admission inhalers.  -wean supplemental oxygen  -switch to levofloxacin for HCAP coverage  -incentive spirometry     Generalized Weakness  Acute on chronic weakness (admitting chief complaint was weakness). +dysmetria of upper extremities. No focal deficits. Suspect   -5/7 non-contrast head CT to check for acute stroke. Negative for stroke  "but diffuse volume loss noted.   -PT and OT ordered     Hypertension  Blood pressures remain well-controlled.  - Continue prior to admission metoprolol, spironolactone  - Diuresis as above      Hyperlipidemia  - Continue prior to admission simvastatin      Chronic kidney disease, stage III-IV  Baseline creatinine variable, over past ~6 months ranging anywhere from 1.3 to 2.2. Creatinine 1.84 on admission.  Urinalysis on admission appeared contaminated with 6 squamous cells, though still with only 3 WBC and 8 RBC  - Daily BMP with diuresis  - Avoid nephrotoxins  Recent Labs   Lab 05/08/19  0640 05/07/19  0629 05/06/19  0543   CR 1.91* 1.77* 1.73*     Anemia, with history of colonic angioid ectasias  Patient with a hemoglobin of 9.5 on admission.  Denies signs of blood loss.   - Monitor.     Diabetes mellitus type 2  Prior to admission on glipizide. Hemoglobin A1c 6.3% 4/9/19  - Hold oral agents  - Blood sugars have been well-controlled without need for insulin, will monitor blood sugars with BMP and otherwise defer insulin or frequent blood sugar checks     History of Bell's palsy  Stable.  - Continue prior to admission Lyrica.     GERD  - Continue prior to admission ranitidine.     Pruritis  - Ordered prior to admission hydroxyzine      Constipation  - Continue prior to admission bisacodyl regimen     Orders Placed This Encounter      Regular Diet Adult    DVT prophylaxis: Pneumatic Compression Devices and Ambulate every shift  Code Status: DNR/DNI    Disposition: Expected discharge to TCU in 1-2 days pending diuresis plan and PT eval recommendations.     Elodia Matias MD  [phone] (7am - 6pm)  Text Page  ~~~~~~~~~~~~~~~~~~~~~~~~~~~~~~~~~~~~~~~~~~~~~~~  Interval History   Still feels very weak, \"Like my legs are buckling like a horse.\" Uses a rolling walker at baseline. +productive cough. Explained antibiotic plan for pneumonia treatment and likely need for TCU at discharge.     -Data reviewed today: I " reviewed all new labs and imaging results over the last 24 hours.     Physical Exam   Temp: 98.3  F (36.8  C) Temp src: Oral BP: 102/52 Pulse: 73 Heart Rate: 69 Resp: 18 SpO2: 93 % O2 Device: Nasal cannula Oxygen Delivery: 2 LPM  Vitals:    05/06/19 0300 05/07/19 0515 05/08/19 0500   Weight: 71.9 kg (158 lb 9.6 oz) 70.5 kg (155 lb 6 oz) 73.2 kg (161 lb 4.8 oz)     Vital Signs with Ranges  Temp:  [97.7  F (36.5  C)-98.7  F (37.1  C)] 98.3  F (36.8  C)  Pulse:  [73] 73  Heart Rate:  [69-72] 69  Resp:  [18-22] 18  BP: ()/(51-63) 102/52  SpO2:  [90 %-96 %] 93 %  I/O last 3 completed shifts:  In: 300 [I.V.:300]  Out: 1150 [Urine:1150]    Constitutional: Propped up in bed, NAD, pleasant and interactive  HEENT: mmm, sclerae anicteric  Respiratory:     Lungs CTAB, no wheezes or crackles  Severe kyphosis  Cardiovascular: irregular, no murmurs  no LE edema  No JVD  GI: soft, non-tender, nondistended  Skin/Integument: warm, dry, no acute rashes; varicosities on bilateral legs  Musc: MUNROE, normal limb strength x 4, right shoulder ROM limited by tendon pain  Neuro: AOx3, no tremors or focal deficits, speech fluent  Psych: not anxious, no delirium     Medications     - MEDICATION INSTRUCTIONS -         clindamycin  900 mg Intravenous Once     doxycycline hyclate  50 mg Oral Q12H SG     fluticasone  2 spray Both Nostrils QPM     furosemide  40 mg Intravenous Q12H     metoprolol tartrate  25 mg Oral BID     pregabalin  75 mg Oral BID     ranitidine  150 mg Oral At Bedtime     simvastatin  10 mg Oral At Bedtime     spironolactone  25 mg Oral Daily     umeclidinium  1 puff Inhalation Daily       Data   Recent Labs   Lab 05/08/19  0640 05/07/19  0651 05/07/19  0629 05/06/19  0543  05/04/19  0615 05/03/19  1716   WBC  --  10.3  --   --   --  4.4 4.9   HGB  --  10.6*  --   --   --  9.3* 9.5*   MCV  --  83  --   --   --  85 85   PLT  --  164  --   --   --  169 159   INR  --  1.17*  --   --   --   --   --      --  140 140   <  > 140 141   POTASSIUM 4.1  --  4.2 3.9   < > 4.0 4.5   CHLORIDE 101  --  99 100   < > 102 102   CO2 34*  --  34* 35*   < > 33* 33*   BUN 49*  --  45* 49*   < > 40* 40*   CR 1.91*  --  1.77* 1.73*   < > 1.75* 1.84*   ANIONGAP 4  --  7 5   < > 5 6   GERARD 9.2  --  9.7 9.7   < > 9.5 9.5   *  --  161* 125*   < > 65* 167*   ALBUMIN  --   --   --   --   --   --  3.3*   PROTTOTAL  --   --   --   --   --   --  7.4   BILITOTAL  --   --   --   --   --   --  0.6   ALKPHOS  --   --   --   --   --   --  73   ALT  --   --   --   --   --   --  14   AST  --   --   --   --   --   --  10   TROPI  --   --   --   --   --   --  <0.015    < > = values in this interval not displayed.       Imaging:  Recent Results (from the past 24 hour(s))   CT Head w/o Contrast    Narrative    CT OF THE HEAD WITHOUT CONTRAST 5/7/2019 12:27 PM     COMPARISON: Head CT 10/19/2018    HISTORY: TIA, initial exam. Altered level of consciousness (LOC),  unexplained.    TECHNIQUE: 5 mm thick axial CT images of the head were acquired  without IV contrast material.    FINDINGS: There is moderate diffuse cerebral volume loss. There are  extensive confluent areas of decreased density in the cerebral white  matter bilaterally that are consistent with sequela of chronic small  vessel ischemic disease.    The ventricles and basal cisterns are within normal limits in  configuration given the degree of cerebral volume loss. There is no  midline shift. There are no extra-axial fluid collections.    No intracranial hemorrhage, mass or recent infarct.    The visualized paranasal sinuses are well-aerated. There is no  mastoiditis. There are no fractures of the visualized bones.      Impression    IMPRESSION: Diffuse cerebral volume loss and cerebral white matter  changes consistent with chronic small vessel ischemic disease. No  evidence for acute intracranial pathology.      Radiation dose for this scan was reduced using automated exposure  control, adjustment of the  mA and/or kV according to patient size, or  iterative reconstruction technique.    KEIKO ORDONEZ MD   XR Chest 2 Views    Narrative    CHEST TWO VIEW   5/7/2019 12:31 PM     HISTORY: Cough, heart failure.    COMPARISON: 5/3/2019.    FINDINGS: Left subclavian cardiac device in place. No pneumothorax.  The heart is enlarged. There is no pulmonary edema. There is a right  pleural effusion. Mild bibasilar atelectasis or scarring.  New infiltrate in the left midlung. Lungs are otherwise clear.  Degenerative disease and previous vertebroplasties in the spine.      Impression    IMPRESSION: New small infiltrate in the left midlung.    BRISA MCKEON MD

## 2019-05-08 NOTE — PROGRESS NOTES
[facility]  Cardiology Progress Note  Date of Service: 05/08/2019      Assessment & Plan   Helene Gibbs is a 84 year old female with h/o permanent atrial fibrillation s/p AVN ablation and PPM 2012, medical mgmt for CAD, angiectasias with severe anemia with plans for left atrial appendage occluder device planned to be done as an outpatient on 5/7 and HFpEF with moderate RV dysfunction,  who was admitted on 5/3/2019 due to generalized weakness and SOB.  She was started on diuretic therapy for HFpEF.      Assessment:  1.  Acute on diastolic heart failure, with decreased rv function and pulm htn   - admit wt 165, current wt 155, dry wt 161 (inaccurate?)   - nt pro bnp remains elevated at 9k, but exam appears much more euvolemic without jvp       2.  Afib with PPM in place not in anticoagulation due to anemia, CHADS VASC 4   - initally planned for watchman device 5/7 am but postponed due to weakness   - per EP    3.  CKD, cr stable here ~1.8, increased to 1.9 suggesting contraction    4.  HCAP, per IM    5.  Moderate to severe TR          Plan:   1. Torsemide 20 mg now  2. Torsemide 40 mg daily tomorrow  3. Restart eliquis at 2.5 mg bid  4. Encourage ambulation   5. Bmp daily  6. Will arrange for outpt follow up with cardiology and scheduling of watchman device.       Tina Browning PA-C  [facility]  Pager: [phone]     Interval History   Feels poorly, weak, coughing, no fevers.  No cp.  States breathing is ok.  Feels worse than yesterday, worsened overnight.      Physical Exam   Temp: 98.3  F (36.8  C) Temp src: Oral BP: 102/52   Heart Rate: 69 Resp: 18 SpO2: 93 % O2 Device: Nasal cannula Oxygen Delivery: 2 LPM  Vitals:    05/06/19 0300 05/07/19 0515 05/08/19 0500   Weight: 71.9 kg (158 lb 9.6 oz) 70.5 kg (155 lb 6 oz) 73.2 kg (161 lb 4.8 oz)       Constitutional   Chronically ill appearing, falls asleep easily while talking.    Skin   pale  Neck  Neck veins are flat today.   Lungs bibasilar  rhonchi  Cardiac rrr, no murmur or rub/  Abdomen   abdomen soft, bowel sounds normoactive, no hepatosplenomegaly  Extremities and Back   no edema.      Medications     - MEDICATION INSTRUCTIONS -         clindamycin  900 mg Intravenous Once     fluticasone  2 spray Both Nostrils QPM     furosemide  40 mg Intravenous Q12H     levofloxacin  750 mg Intravenous Q48H     metoprolol tartrate  25 mg Oral BID     pregabalin  75 mg Oral BID     ranitidine  150 mg Oral At Bedtime     simvastatin  10 mg Oral At Bedtime     spironolactone  25 mg Oral Daily     umeclidinium  1 puff Inhalation Daily       Data   Last 24 hours labs reviewed     Tele: paced

## 2019-05-08 NOTE — PLAN OF CARE
[id number]: A/O, tele 100% V-paced. VSS on 2L O2, productive cough, T/R as pt allows, bedpan, tramadol X1 for pain in  BLE. Pt refused to get up until PT comes, they should be here this afternoon. Used bedpan. Bladder scan at end of shift 106ml. Would be A1-2 w/walker. IV abx. Cont to monitor.

## 2019-05-08 NOTE — PROGRESS NOTES
"   05/08/19 1538   Quick Adds   Type of Visit Initial Occupational Therapy Evaluation   Living Environment   Lives With alone   Living Arrangements assisted living   Home Accessibility no concerns   Transportation Anticipated   (private )   Self-Care   Usual Activity Tolerance moderate   Current Activity Tolerance poor   Regular Exercise No   Equipment Currently Used at Home walker, rolling   Activity/Exercise/Self-Care Comment shower chair, home 02 uses occasionally at night (1-2L), shower chair   Functional Level   Ambulation 1-->assistive equipment   Transferring 1-->assistive equipment   Toileting 1-->assistive equipment   Bathing 3-->assistive equipment and person   Dressing 2-->assistive person   Eating 0-->independent   Communication 0-->understands/communicates without difficulty   Swallowing 0-->swallows foods/liquids without difficulty   Cognition 0 - no cognition issues reported   Fall history within last six months no   Prior Functional Level Comment Pt reports that she has an aide BID for compression socks, bath aid once a week. Pt reports independence with all other dressing. Has breakfast delivered in AM and walks to dining room in PM with 4ww.. Has HHA/friend weekly to A with bathing, has private  for transport, [facility] meds and finances.    General Information   Onset of Illness/Injury or Date of Surgery - Date 05/02/19   Referring Physician Dr. Elodia Matias   Patient/Family Goals Statement TCU at [facility]   Additional Occupational Profile Info/Pertinent History of Current Problem 83yo female admitted with worsening SOB, weakness and diagnosed acute CHF--plan was to have Watchman procedure on 5/7 however was cancelled as pt developed BUE \"shakiness\" and we cough now dx'd with likely community-acquired pnemonia. See H&P for complex medical hx.    Precautions/Limitations fall precautions;oxygen therapy device and L/min  (3L)   Cognitive Status Examination   Orientation orientation to person, " place and time   Level of Consciousness alert   Follows Commands (Cognition) WNL   Memory intact   Sensory Examination   Sensory Comments B foot neuropathy baseline   Pain Assessment   Patient Currently in Pain No   Range of Motion (ROM)   ROM Comment LUE WNL, R limited <90o d/t rotator cuff injury   Strength   Strength Comments L oli 3+/5   Hand Strength   Hand Strength Comments B  strength functional   Coordination   Coordination Comments No tremoring noted in hands this PM, able to perform thumb/digit opposition bilaterally and manipulate common ADL objects. Did notice some gross UE shakiness twice during session (intermittent)   Mobility   Bed Mobility Comments SBA bed flat no rail, indep static sitting and no LOB when bending toward floor to remove sock.   Transfer Skills   Transfer Comments Pt very fearful of falling.    Transfer Skill: Bed to Chair/Chair to Bed   Level of Perkins: Bed to Chair moderate assist (50% patients effort)   Physical Assist/Nonphysical Assist: Bed to Chair 1 person assist;verbal cues   Assistive Device - Transfer Skill Bed to Chair Chair to Bed Rehab Eval rolling walker   Transfer Skill: Sit to Stand   Level of Perkins: Sit/Stand contact guard   Physical Assist/Nonphysical Assist: Sit/Stand 1 person assist   Assistive Device for Transfer: Sit/Stand rolling walker   Transfer Skill: Toilet Transfer   Toilet Transfer Skill Comments Refused amb to bathroom, bedside commode transfer Min-Mod A of 1   Balance   Balance Comments Amb around bed only with Min A of 2/fearful of falling   Lower Body Dressing   Level of Perkins: Dress Lower Body minimum assist (75% patients effort)   Physical Assist/Nonphysical Assist: Dress Lower Body 1 person assist   Activities of Daily Living Analysis   Impairments Contributing to Impaired Activities of Daily Living balance impaired;fear and anxiety;ROM decreased;strength decreased   General Therapy Interventions   Planned Therapy  "Interventions ADL retraining;IADL retraining;transfer training;strengthening   Clinical Impression   Criteria for Skilled Therapeutic Interventions Met yes, treatment indicated   OT Diagnosis decreased ADL/IADL performance   Influenced by the following impairments weakness, impaired balance, impaired sensation B feet, impaired endurance   Assessment of Occupational Performance 3-5 Performance Deficits   Identified Performance Deficits functional mobility, dressing, toileting, household mgmt   Clinical Decision Making (Complexity) Moderate complexity   Therapy Frequency daily   Predicted Duration of Therapy Intervention (days/wks) 4 days   Anticipated Discharge Disposition Transitional Care Facility   Risks and Benefits of Treatment have been explained. Yes   Patient, Family & other staff in agreement with plan of care Yes   [facility] TM \"6 Clicks\"   2016, Trustees of [facility], under license to [employer].  All rights reserved.   6 Clicks Short Forms Daily Activity Inpatient Short Form   [facility]  \"6 Clicks\" Daily Activity Inpatient Short Form   1. Putting on and taking off regular lower body clothing? 3 - A Little   2. Bathing (including washing, rinsing, drying)? 3 - A Little   3. Toileting, which includes using toilet, bedpan or urinal? 3 - A Little   4. Putting on and taking off regular upper body clothing? 4 - None   5. Taking care of personal grooming such as brushing teeth? 4 - None   6. Eating meals? 4 - None   Daily Activity Raw Score (Score out of 24.Lower scores equate to lower levels of function) 21   Total Evaluation Time   Total Evaluation Time (Minutes) 15     "

## 2019-05-09 NOTE — PLAN OF CARE
PT:  Discharge Planner PT   Patient plan for discharge: TCU  Current status: Pt required SBA for supine to sit, CGA sit to stand with 4ww, CGA for transfer bed to chair with 4ww. Participated in seated strengthening and repeated sit to stands for exercise. Pt very motivated to improve and get stronger.  Barriers to return to prior living situation: Needs assist with mobility, very limited gait distance as of yet, falls risk, on O2  Recommendations for discharge: TCU  Rationale for recommendations: Pt would benefit from continued PT to improve strength, balance, mobility to increase independence, reduce falls risk and increase safety before returning home.       Entered by: Michelle Forbes 05/09/2019 11:04 AM

## 2019-05-09 NOTE — PLAN OF CARE
A&O. VSS, on 3 L O2 NC, uses 1 L @ HS at baseline at home. Tele V paced. Regular diet.  Up with assist 1-2, walker and belt to the commode; patient is afraid of falling and needs a lot of encouragement with mobility. Denies pain.

## 2019-05-09 NOTE — PLAN OF CARE
Discharge Planner OT   Patient plan for discharge: TCU   Current status: pt Fara sit to stand from chair, amb with 4WW Fara to/from bathroom, completed toilet transfer with CGA and Fara clothing management. Pt stood at sink with CGA/Fara for balance while completing grooming/hygiene task, pt fatigues with activity, cues for safety with amb up to vs backing up toward HOB, pt completed sit to supine SBA. VSS O2 >90% on 2L.   Barriers to return to prior living situation: current level of A, lives alone  Recommendations for discharge: TCU per plan established by the Occupational Therapist  Rationale for recommendations: pt in agreement with TCU recommendation and requests to go to [place] and will pay for private room         Entered by: Denise Jonas 05/09/2019 2:41 PM

## 2019-05-09 NOTE — PLAN OF CARE
VSS on 3L NC. Tele- 100% VPaced. Regular diet. Pivots to commode with assist of 2. On IV Abx for pneumonia. Plan to follow with cardiology as an outpatient for a watchman device. Continue to monitor.

## 2019-05-09 NOTE — PROGRESS NOTES
Brief Cardiology Note  Pt: Helene Gibbs    1935      Helene Gibbs is a 84 year old female with h/o permanent atrial fibrillation s/p AVN ablation and PPM , medical mgmt for CAD, angiectasias with severe anemia with plans for left atrial appendage occluder device planned to be done as an outpatient on  and HFpEF with moderate RV dysfunction,  who was admitted on 5/3/2019 due to generalized weakness and SOB.  She was started on diuretic therapy for HFpEF, with minimal diuresis.  Developed hospital acquired pna here, but initial cause of weakaness unclear as pt has appeared euvolemic on exam.    Diuresed agressively with IV lasix and no with rising cr on increase po of torsemide 40 mg daily.    Decrease torsemide back to 20 mg daily, continue rebecca 25 mg daily, will reschedule outpt watchman.      F/u arranged with Angelica Grady NP .   Will follow peripherally, please call with questions.      Tina Browning PA-C  2:21 PM 2019   [facility]  Pager: [phone]

## 2019-05-09 NOTE — PROGRESS NOTES
[facility]    Hospitalist Progress Note      Assessment & Plan   Helene Gibbs is a 84 year old female who was admitted on 5/3/2019 with generalized weakness and shortness of breath.      Acute diastolic congestive heart failure exacerbation  Presents with generalized weakness, fatigue, increased shortness of breath using home oxygen throughout the day rather than just at night. NtpBNP 9289 on admission with a troponin of less than 0.015. Echocardiogram completed in February 2019 demonstrated moderately severe tricuspid regurgitation, severe pulmonary hypertension, and ejection fraction of 50 to 55% with severe biatrial enlargement.  - Cardiology consulted, appreciate assistance, now signed off  - restarted on Torsemide and received 40mg today, this has been decreased to PTA dose 20mg daily due to increasing creatine  - Daily weights, strict I/O. Wt is down  - Continue spironolactone     Chronic atrial fibrillation status post ablation and pacemaker placement  Anticoagulation contraindicated due to colonic angioid angectasias and GI bleeding history.  - Continue prior to admission metoprolol   - EP consulted per cardiology. TTE done 5/6. Watchman was scheduled for 5/7 but cancelled due to patient weakness. This procedure will be deferred to outpatient setting at a future date.  - apixaban has been resumed and will defer this to OP EP f/u      COPD  Healthcare Acquired Pneumonia, probable  Severe kyphosis with restrictive lung disease  Productive cough 5//7. Possible COPD exacerbation vs wet cough from heart failure vs pneumonia (new left mid lung infiltrate on CXR from 5/7).   -Continue prior to admission inhalers.  -wean supplemental oxygen  -continue with levofloxacin for HCAP coverage  -add mucinex and flutter valve, feels congested and need more stuff to come out  -incentive spirometry     Generalized Weakness  Acute on chronic weakness (admitting chief complaint was weakness). +dysmetria of  upper extremities. No focal deficits. Suspect   -5/7 non-contrast head CT to check for acute stroke. Negative for stroke but diffuse volume loss noted.   -PT and OT, rec TCU     Hypertension  Blood pressures remain well-controlled.  - Continue prior to admission metoprolol, spironolactone  - Diuresis as above      Hyperlipidemia  - Continue prior to admission simvastatin      Chronic kidney disease, stage III-IV  Baseline creatinine variable, over past ~6 months ranging anywhere from 1.3 to 2.2. Creatinine 1.84 on admission.  Urinalysis on admission appeared contaminated with 6 squamous cells, though still with only 3 WBC and 8 RBC  - Daily BMP with diuresis, cr has been increasing 1.7>1.9>2.06  - will monitor this since received higher dose of torsemide today  - bmp in am     Anemia, with history of colonic angioid ectasias  Patient with a hemoglobin of 9.5 on admission.  Denies signs of blood loss.   - Monitor.     Diabetes mellitus type 2  Prior to admission on glipizide. Hemoglobin A1c 6.3% 4/9/19  - Hold oral agents  - Blood sugars have been well-controlled without need for insulin, will monitor blood sugars with BMP and otherwise defer insulin or frequent blood sugar checks     History of Bell's palsy  Stable.  - Continue prior to admission Lyrica.     GERD  - Continue prior to admission ranitidine.     Pruritis  - Ordered prior to admission hydroxyzine      Constipation  - Continue prior to admission bisacodyl regimen       DVT Prophylaxis: eliquis  Code Status: DNR/DNI    Disposition: Expected discharge likely in 1 day if renal function remains stable and improving cough/congestion. .    Wale Jeong MD  Text Page  (7am to 6pm)    Interval History   Patient states she feels congested still and would want something to loosen up the phlegm.   Still with generalized weakness.   Afebrile.     -Data reviewed today: I reviewed all new labs and imaging results over the last 24 hours. I personally reviewed  no images or EKG's today.    Physical Exam   Temp: 98.3  F (36.8  C) Temp src: Oral BP: 103/58 Pulse: 70 Heart Rate: 69 Resp: 20 SpO2: 100 % O2 Device: Nasal cannula Oxygen Delivery: 2 LPM  Vitals:    05/07/19 0515 05/08/19 0500 05/09/19 0605   Weight: 70.5 kg (155 lb 6 oz) 73.2 kg (161 lb 4.8 oz) 73.4 kg (161 lb 14.4 oz)     Vital Signs with Ranges  Temp:  [98.3  F (36.8  C)] 98.3  F (36.8  C)  Pulse:  [69-70] 70  Heart Rate:  [69-71] 69  Resp:  [20] 20  BP: (103-111)/(58-67) 103/58  SpO2:  [89 %-100 %] 100 %  I/O last 3 completed shifts:  In: 560 [P.O.:560]  Out: 500 [Urine:500]    Constitutional: Alert, awake and no apparent distress  Respiratory: Sounds congested when coughing, otherwise diminished BS at the bases and upper lungs CTA  Cardiovascular: irregularly irregular  GI: soft and non-tender  Skin/Integumen: warm and dry, +varicose veins in LE     Medications     - MEDICATION INSTRUCTIONS -         apixaban ANTICOAGULANT  2.5 mg Oral BID     fluticasone  2 spray Both Nostrils QPM     guaiFENesin  600 mg Oral BID     levofloxacin  750 mg Intravenous Q48H     metoprolol tartrate  25 mg Oral BID     pregabalin  75 mg Oral BID     ranitidine  150 mg Oral At Bedtime     simvastatin  10 mg Oral At Bedtime     spironolactone  25 mg Oral Daily     [START ON 5/10/2019] torsemide  20 mg Oral Daily     umeclidinium  1 puff Inhalation Daily       Data   Recent Labs   Lab 05/09/19  0616 05/08/19  0640 05/07/19  0651 05/07/19  0629  05/04/19  0615 05/03/19  1716   WBC  --   --  10.3  --   --  4.4 4.9   HGB  --   --  10.6*  --   --  9.3* 9.5*   MCV  --   --  83  --   --  85 85   PLT  --   --  164  --   --  169 159   INR  --   --  1.17*  --   --   --   --     139  --  140   < > 140 141   POTASSIUM 4.2 4.1  --  4.2   < > 4.0 4.5   CHLORIDE 100 101  --  99   < > 102 102   CO2 35* 34*  --  34*   < > 33* 33*   BUN 54* 49*  --  45*   < > 40* 40*   CR 2.06* 1.91*  --  1.77*   < > 1.75* 1.84*   ANIONGAP 3 4  --  7   < > 5 6    GERARD 9.0 9.2  --  9.7   < > 9.5 9.5   * 142*  --  161*   < > 65* 167*   ALBUMIN  --   --   --   --   --   --  3.3*   PROTTOTAL  --   --   --   --   --   --  7.4   BILITOTAL  --   --   --   --   --   --  0.6   ALKPHOS  --   --   --   --   --   --  73   ALT  --   --   --   --   --   --  14   AST  --   --   --   --   --   --  10   TROPI  --   --   --   --   --   --  <0.015    < > = values in this interval not displayed.       No results found for this or any previous visit (from the past 24 hour(s)).

## 2019-05-09 NOTE — CONSULTS
Care Transition Initial Assessment - SW     Met with: Patient    Principal Problem:    Permanent atrial fibrillation (H)  Active Problems:    Acute respiratory failure with hypoxia (H)       DATA  Lives With: alone, facility resident([facility])   Living Arrangements: assisted living ([facility])     Identified issues/concerns regarding health management:   Resources List: Skilled Nursing Facility     Transportation Anticipated: (private )    Per social work consult for discharge planning.  Patient was admitted on 5-3-19 with atrial fibrillation.  The tentative date of discharge is 5-10-19.  Reviewed chart.  Patient lives alone in an apartment at [facility].  Patient uses a rolling walker at baseline.  Patient has a shower chair in the bathroom.  Patient uses home oxygen at night occasionally at 1-2 liters.  Patient has assistance twice a day for compression socks.  Patient receives bathing assistance.  Patient receives a meal delivery for breakfast daily and walks to the dining room for dinner.  Patient is independent with dressing, meds, and finances.  Reviewed the therapy discharge recommendation of tcu placement on discharge and patient is in agreement.  Patient is asking for a private room wherever she goes.  Explained that there is an amenity fee of between $36-$45 per day.  Patient is in agreement.  Referrals sent, via discharge on the double, to check bed availability at [facility], [facility], and [facility].  Received calls back from [facility] and [facility].  They both have beds available for today.  [facility] has no available beds.  Spoke with patient's MD who states that patient will be ready to discharge tomorrow.  [facility] will have a bed available tomorrow.  Patient states she is hopeful her niece will transport when discharge is known.  Otherwise she is in agreement with wheelchair transport and understands that this will be private  pay.    ASSESSMENT  Cognitive Status:  awake and alert  Concerns to be addressed: discharge planning, tcu placement on discharge.     PLAN  Financial costs for the patient includes private room amenity fees and possible transportation costs.  Patient given options and choices for discharge TCU choices.  Patient/family is agreeable to the plan?  Yes  Patient Goals and Preferences: TCU on discharge.  Patient anticipates discharging to:  TCU    Will continue to follow and assist with a safe discharge plan.    CECY Beltran, [facility]  [phone].

## 2019-05-09 NOTE — PLAN OF CARE
VSS.  100% V-Paced.   Tolerating increased activity- in chair most of the shift.   On antibiotics for pneumonia and has CHF - BNP 9000  Using Incentive spirometer and acapella.   Plan transfer to room 256 and home in 1-2 days. ?Watchman as an outpatient

## 2019-05-10 NOTE — PROGRESS NOTES
Pt discharged to [place] [facility].  [facility] wheelchair transport with portable oxygen.  Pt declines pain or SOB at time of discharge.  TCU packet sent with transport team.

## 2019-05-10 NOTE — PLAN OF CARE
VSS on 2L NC. A&Ox4. Tele- 100% VPaced. Up to the commode with assist of 1. On IV Levaquin q48 hours. PRN Tramadol for back pain. Plan to discharge in 1-2 days and have watchman device as outpatient. Continue to monitor.

## 2019-05-10 NOTE — PROGRESS NOTES
Received intake call for home oxygen at 1:00PM. Patient is current home oxygen patient with [facility].  Needs transport tank to get to TCU.  1:32PM- O2 transport tank delivered to patient at bedside.  Patient instructed to keep tank in her room at [facility] when she gets there and formerly [facility] will coordinate picking the tank up.  3:49PM- Spoke with care coordinator, Chacha, confirmed we received the order, and informed her that the O2 tank was delivered to patient at 1:32pm.  Confirmed with Chacha address for [facility] TCU in [place].

## 2019-05-10 NOTE — PLAN OF CARE
"Discharge Planner PT   [facility]  Current status: RN requested that PT see pt today. Pt was up in a chair, firmly declined any standing or ambulation stating \"I'm leaving for [facility] at 4:00 and need to rest before that.\" Pt did participate in seated BLE exercises.  Barriers to return to prior living situation: lives alone, level of assist required, weakness, fall risk  Recommendations for discharge: TCU  Rationale for recommendations: Pt would benefit from PT at TCU to progress strength, balance and mobility so pt can return home safely.        Entered by: Maude Mckeon 05/10/2019 1:53 PM       "

## 2019-05-10 NOTE — PROGRESS NOTES
SW:  D:  Received discharge orders for patient.  Bed available at [facility] for today.  Call placed to patient's niece Enedelia to inquire about transportation options and she is asking for transportation to be arranged.  Call placed to [facility] to arrange for wheelchair transport at 16:00 today.  Patient will need portable oxygen for transport.  Patient has home oxygen from [facility].  Call placed to [facility] to order a portable oxygen tank for transport to be delivered by 15:30 today.  Patient informed of the plan and in agreement to the plan.  Call placed to update [facility] and faxed the orders and the PAS.    PAS-RR    D: Per DHS regulation, SW completed and submitted PAS-RR to [place] [employer] via the Senior LinkAge Line.  PAS-RR confirmation # is : [id number].    I: EVAN spoke with patient and niece and they are aware a PAS-RR has been submitted.  EVAN reviewed with patient and niece that they may be contacted for a follow up appointment within 10 days of hospital discharge if their SNF stay is < 30 days.  Contact information for [facility] LinkAge Line was also provided.    A: Patient and niece verbalized understanding.    P: Further questions may be directed to [facility] LinkAge Line at #[phone], option #4 for PAS-RR staff.

## 2019-05-10 NOTE — PLAN OF CARE
Discharge Planner OT   Patient plan for discharge: TCU  Current status: Pt went from supine to sit EOB With CGA and extra time. Pt went from sit<>Stand with minimum A and transferred to the bedside chair with minimum A and walker. While seated/standing pt completed LE dressing with minimum-moderate A.   Barriers to return to prior living situation:  current level of A for I/ADLs, Decreased independence in ADLs; lives alone  Recommendations for discharge: TCU  Rationale for recommendations: Skilled OT to address independence and safety in I/ADLs, as pt is below baseline.        Entered by: Sanju Romo 05/10/2019 8:28 AM

## 2019-05-10 NOTE — DISCHARGE SUMMARY
[facility]  Hospitalist Discharge Summary       Date of Admission:  5/3/2019  Date of Discharge:  5/10/2019  Discharging Provider: Wale Jeong MD      Discharge Diagnoses   Acute diastolic congestive heart failure exacerbation  Healthcare Acquired Pneumonia, organism unspecified, probable  Generalized weakness    COPD  Severe kyphosis with restrictive lung disease  Chronic atrial fibrillation status post ablation and pacemaker placement  HTN  Hyperlipidemia  CKD III-IV  Anemia, with history of colonic angioid ectasias  Diabetes mellitus type 2  Hx of Bell's Palsy  GERD  Pruritis  constipation    Follow-ups Needed After Discharge   Follow-up Appointments     Follow Up and recommended labs and tests      Follow up with group home physician.  The following labs/tests are   recommended: basic metabolic panel in a week.             Unresulted Labs Ordered in the Past 30 Days of this Admission     No orders found from 3/4/2019 to 5/4/2019.          Discharge Disposition   Discharged to short-term care facility  Condition at discharge: Stable    Hospital Course      Helene Gibbs is a 84 year old female who was admitted on 5/3/2019 with generalized weakness and shortness of breath.      Acute diastolic congestive heart failure exacerbation  Presents with generalized weakness, fatigue, increased shortness of breath using home oxygen throughout the day rather than just at night. NtpBNP 9289 on admission with a troponin of less than 0.015. Echocardiogram completed in February 2019 demonstrated moderately severe tricuspid regurgitation, severe pulmonary hypertension, and ejection fraction of 50 to 55% with severe biatrial enlargement. She has received Lasix initially and has been transitioned back to Torsemide at a higher dose 40mg daily, but this was decreased back to her PTA dose of 20mg daily due slight increase in her cr.    - discharging on PTA Spironolactone and torsemide 20mg paulette  -  Cardiology has evaluated patient this stay    Healthcare Acquired Pneumonia,organism unspecified, probable  Severe kyphosis with restrictive lung disease  COPD  Productive cough 5//7. Possible COPD exacerbation vs wet cough from heart failure vs pneumonia (new left mid lung infiltrate on CXR from 5/7).   -Continue prior to admission inhalers.  -continue with levofloxacin 750mg every other day for 3 more doses to complete a total of 10 days course    Chronic atrial fibrillation status post ablation and pacemaker placement  Anticoagulation contraindicated due to colonic angioid angectasias and GI bleeding history.  - Continue prior to admission metoprolol   - EP consulted per cardiology. TTE done 5/6. Watchman was scheduled for 5/7 but cancelled due to patient weakness. This procedure will be deferred to outpatient setting at a future date.  - apixaban has been resumed and will defer this to OP EP f/u      Generalized Weakness  Acute on chronic weakness (admitting chief complaint was weakness). +dysmetria of upper extremities. No focal deficits.   -5/7 non-contrast head CT to check for acute stroke, Negative for stroke but diffuse volume loss noted.   -PT and OT, rec TCU     Hypertension  Blood pressures remain well-controlled.  - Continue prior to admission metoprolol, spironolactone     Hyperlipidemia  - Continue prior to admission simvastatin      Chronic kidney disease, stage III-IV  Baseline creatinine variable, over past ~6 months ranging anywhere from 1.3 to 2.2. Creatinine 1.84 on admission.  Urinalysis on admission appeared contaminated with 6 squamous cells, though still with only 3 WBC and 8 RBC  - Daily BMP with diuresis, cr has been increasing but within her baseline. 1.7>1.9>2.06>1.9 on day of discharge  - bmp in 1 week after discharge     Anemia, with history of colonic angioid ectasias  Patient with a hemoglobin of 9.5 on admission.  Denies signs of blood loss.      Diabetes mellitus type 2  Prior to  admission on glipizide. Hemoglobin A1c 6.3% 4/9/19  - Blood sugars have been well-controlled without need for insulin in hospital. Resume PTA med.      History of Bell's palsy  Stable.  - Continue prior to admission Lyrica.     GERD  - Continue prior to admission ranitidine.     Pruritis  - continue prior to admission hydroxyzine      Constipation  - Continue prior to admission bisacodyl regimen       Consultations This Hospital Stay   CARDIOLOGY IP CONSULT  PHYSICAL THERAPY ADULT IP CONSULT  OCCUPATIONAL THERAPY ADULT IP CONSULT  OCCUPATIONAL THERAPY ADULT IP CONSULT  PHYSICAL THERAPY ADULT IP CONSULT  OCCUPATIONAL THERAPY ADULT IP CONSULT    Code Status   DNR/DNI    Time Spent on this Encounter   I, Wale Jeong, personally saw the patient today and spent 40 minutes discharging this patient.       Wale Jeong MD  [facility]  ______________________________________________________________________    Physical Exam   Vital Signs: Temp: 97.6  F (36.4  C) Temp src: Oral BP: 116/58 Pulse: 69 Heart Rate: 73 Resp: 18 SpO2: 92 % O2 Device: Nasal cannula Oxygen Delivery: 1 LPM  Weight: 161 lbs 4.8 oz  General Appearance: Alert, awake and no apparent distress  Respiratory: CTAB, no wheezing  Cardiovascular: irregular rate and rhythm  GI: soft and non-tendr  Skin: warm and dry, +varicose veins in LE         Primary Care Physician   Neisha Esparza    Discharge Orders      Basic metabolic panel     Hemoglobin    Last Lab Result: Hemoglobin (g/dL)       Date                     Value                 05/07/2019               10.6 (L)         ----------     N terminal pro BNP outpatient     Discharge Order: F/U with Cardiac  [facility]      Discharge Order: F/U with Cardiac  [facility]      General info for SNF    Length of Stay Estimate: Short Term Care: Estimated # of Days <30  Condition at Discharge: Stable  Level of care:skilled   Rehabilitation Potential: Good  Admission H&P remains valid and up-to-date:  Yes  Recent Chemotherapy: N/A  Use Nursing Home Standing Orders: Yes     Mantoux instructions    Give two-step Mantoux (PPD) Per Facility Policy Yes     Follow Up and recommended labs and tests    Follow up with jail physician.  The following labs/tests are recommended: basic metabolic panel in a week.     Activity - Up with assistive device     Activity - Up with nursing assistance     DNR/DNI     Physical Therapy Adult Consult    Evaluate and treat as clinically indicated.    Reason: generalized weakness/deconditioning     Occupational Therapy Adult Consult    Evaluate and treat as clinically indicated.    Reason:  Generalized weakness/deconditioning     Oxygen - Nasal cannula    1-2 Lpm by nasal cannula to keep O2 sats 90% or greater.     Advance Diet as Tolerated    Follow this diet upon discharge: Orders Placed This Encounter      Room Service      Regular Diet Adult       Significant Results and Procedures   Most Recent 3 CBC's:  Recent Labs   Lab Test 05/07/19  0651 05/04/19  0615 05/03/19  1716   WBC 10.3 4.4 4.9   HGB 10.6* 9.3* 9.5*   MCV 83 85 85    169 159     Most Recent 3 BMP's:  Recent Labs   Lab Test 05/10/19  0726 05/09/19  0616 05/08/19  0640    138 139   POTASSIUM 4.5 4.2 4.1   CHLORIDE 99 100 101   CO2 39* 35* 34*   BUN 57* 54* 49*   CR 1.93* 2.06* 1.91*   ANIONGAP 1* 3 4   GERARD 9.8 9.0 9.2   * 152* 142*     Most Recent 3 BNP's:  Recent Labs   Lab Test 05/08/19  0640 05/03/19  1716 04/09/19  1414 03/12/19  1436 07/16/18  1718 06/20/18  1253   NTBNPI 9,312* 9,289*  --   --  6,532*  --    NTBNP  --   --  4,482* 3,144*  --  3,403*   ,   Results for orders placed or performed during the hospital encounter of 05/03/19   XR Chest 2 Views    Narrative    CHEST TWO VIEWS   5/3/2019 5:46 PM     HISTORY: Shortness of breath.    COMPARISON: 3/1/2019      Impression    IMPRESSION: Lungs are hypoinflated with perihilar and basilar  opacities which may represent atelectasis,  aspiration, or infection.  Component of edema cannot be excluded. Small bilateral pleural  effusions. Cardiac silhouette is enlarged, unchanged. Dual lead  cardiac pacing device is unchanged. Spinal degenerative change and  vertebroplasty change noted.    WILL FLORES MD   US Renal Complete    Narrative    ULTRASOUND RETROPERITONEAL COMPLETE  5/4/2019 9:13 AM     HISTORY: Acute kidney injury.    COMPARISON: December 3, 2015    FINDINGS: The bilateral renal parenchyma are unremarkable in  echogenicity without evidence for shadowing stone or mass. Simple  cysts in both kidneys measuring up to 2.3 cm on the right and 3.1 cm  on the left. No hydronephrosis. Right kidney measures 10.4 x 4.5 x 3.9  cm and the left measures 11.7 x 4.4 x 4.3 cm. Cortical thickness is  1.1 cm on the right and 1.2 cm on the left.  Bladder is unremarkable  given its level of distention.      Impression    IMPRESSION: No obstruction demonstrated.    MABLE VIZCARRA MD   XR Chest 2 Views    Narrative    CHEST TWO VIEW   5/7/2019 12:31 PM     HISTORY: Cough, heart failure.    COMPARISON: 5/3/2019.    FINDINGS: Left subclavian cardiac device in place. No pneumothorax.  The heart is enlarged. There is no pulmonary edema. There is a right  pleural effusion. Mild bibasilar atelectasis or scarring.  New infiltrate in the left midlung. Lungs are otherwise clear.  Degenerative disease and previous vertebroplasties in the spine.      Impression    IMPRESSION: New small infiltrate in the left midlung.    BRISA MCKEON MD   CT Head w/o Contrast    Narrative    CT OF THE HEAD WITHOUT CONTRAST 5/7/2019 12:27 PM     COMPARISON: Head CT 10/19/2018    HISTORY: TIA, initial exam. Altered level of consciousness (LOC),  unexplained.    TECHNIQUE: 5 mm thick axial CT images of the head were acquired  without IV contrast material.    FINDINGS: There is moderate diffuse cerebral volume loss. There are  extensive confluent areas of decreased density in the cerebral  white  matter bilaterally that are consistent with sequela of chronic small  vessel ischemic disease.    The ventricles and basal cisterns are within normal limits in  configuration given the degree of cerebral volume loss. There is no  midline shift. There are no extra-axial fluid collections.    No intracranial hemorrhage, mass or recent infarct.    The visualized paranasal sinuses are well-aerated. There is no  mastoiditis. There are no fractures of the visualized bones.      Impression    IMPRESSION: Diffuse cerebral volume loss and cerebral white matter  changes consistent with chronic small vessel ischemic disease. No  evidence for acute intracranial pathology.      Radiation dose for this scan was reduced using automated exposure  control, adjustment of the mA and/or kV according to patient size, or  iterative reconstruction technique.    KEIOK ORDONEZ MD     *Note: Due to a large number of results and/or encounters for the requested time period, some results have not been displayed. A complete set of results can be found in Results Review.       Discharge Medications   Current Discharge Medication List      START taking these medications    Details   guaiFENesin (MUCINEX) 600 MG 12 hr tablet Take 1 tablet (600 mg) by mouth 2 times daily for 7 days    Associated Diagnoses: Chronic obstructive pulmonary disease, unspecified COPD type (H)      levofloxacin (LEVAQUIN) 750 MG tablet Take 1 tablet (750 mg) by mouth every other day for 3 doses    Associated Diagnoses: Pneumonia of right lung due to infectious organism, unspecified part of lung         CONTINUE these medications which have CHANGED    Details   traMADol (ULTRAM) 50 MG tablet Take 1 tablet (50 mg) by mouth every 6 hours as needed for severe pain  Qty: 12 tablet    Associated Diagnoses: Neck pain         CONTINUE these medications which have NOT CHANGED    Details   albuterol (PROAIR HFA, PROVENTIL HFA, VENTOLIN HFA) 108 (90 BASE) MCG/ACT inhaler  Inhale 2 puffs into the lungs every 4 hours as needed for shortness of breath / dyspnea or wheezing  Qty: 1 Inhaler, Refills: 5    Associated Diagnoses: COPD (chronic obstructive pulmonary disease) (H)      apixaban ANTICOAGULANT (ELIQUIS) 2.5 MG tablet Take 1 tablet (2.5 mg) by mouth 2 times daily  Qty: 60 tablet, Refills: 1    Associated Diagnoses: S/p left hip fracture      betamethasone valerate (VALISONE) 0.1 % external cream APPLY TOPICALLY 2 TIMES DAILY USE SPARINGLY  Qty: 15 g, Refills: 1    Associated Diagnoses: External hemorrhoids      bisacodyl (DULCOLAX) 5 MG EC tablet Take 10 mg by mouth At Bedtime      denosumab (PROLIA) 60 MG/ML SOLN injection Inject 1 mL (60 mg) Subcutaneous every 6 months  Qty: 1 mL, Refills: 1    Associated Diagnoses: Age-related osteoporosis without current pathological fracture      fluticasone (FLONASE) 50 MCG/ACT nasal spray Spray 2 sprays into both nostrils daily  Qty: 15.8 mL, Refills: 11    Associated Diagnoses: Rhinitis, unspecified type      glipiZIDE (GLUCOTROL) 5 MG tablet Take 1 tablet (5 mg) by mouth every morning (before breakfast)  Qty: 90 tablet, Refills: 1    Associated Diagnoses: Type 2 diabetes mellitus with diabetic nephropathy, without long-term current use of insulin (H)      hydrOXYzine (VISTARIL) 25 MG capsule Take 1 capsule (25 mg) by mouth 2 times daily as needed for itching  Qty: 180 capsule, Refills: 1    Associated Diagnoses: Itching      hypromellose (ARTIFICIAL TEARS) 0.5 % SOLN ophthalmic solution Place 1 drop into both eyes every hour as needed for dry eyes      lidocaine (LIDODERM) 5 % patch Place 1 patch onto the skin daily as needed back      LYRICA 75 MG capsule TAKE 1 CAPSULE BY MOUTH TWICE A DAY  Qty: 60 capsule, Refills: 1    Comments: Patient given Rx 2/26/19 hard copy disp 60 WITH 3 REFILLS. Pt has refills remaining at pharm  Associated Diagnoses: Neuropathy      metoprolol tartrate (LOPRESSOR) 25 MG tablet TAKE 1 TABLET BY MOUTH TWICE A  DAY  Qty: 90 tablet, Refills: 1    Associated Diagnoses: NSTEMI (non-ST elevated myocardial infarction) (H)      PROCTOSOL HC 2.5 % cream PLACE RECTALLY 2 TIMES DAILY AS NEEDED FOR HEMORRHOIDS  Qty: 28.35 g, Refills: 1    Associated Diagnoses: External hemorrhoids      ranitidine (ZANTAC) 150 MG tablet Take 1 tablet (150 mg) by mouth At Bedtime  Qty: 90 tablet, Refills: 3    Comments: Dose is changed  Associated Diagnoses: Dyspepsia      simvastatin (ZOCOR) 10 MG tablet Take 1 tablet (10 mg) by mouth At Bedtime  Qty: 90 tablet, Refills: 3    Associated Diagnoses: Type 2 diabetes mellitus with diabetic nephropathy, without long-term current use of insulin (H)      spironolactone (ALDACTONE) 25 MG tablet Take 1-2 tablets (25-50 mg) by mouth daily  Qty: 60 tablet, Refills: 3    Associated Diagnoses: Hypokalemia      tiotropium (SPIRIVA) 18 MCG inhaled capsule Inhale 1 capsule (18 mcg) into the lungs daily  Qty: 90 capsule, Refills: 1    Associated Diagnoses: Chronic obstructive pulmonary disease, unspecified COPD type (H)      torsemide (DEMADEX) 20 MG tablet TAKE 1 TABLET BY MOUTH EVERY DAY  Qty: 90 tablet, Refills: 1    Associated Diagnoses: (HFpEF) heart failure with preserved ejection fraction (H)      ACCU-CHEK SMARTVIEW test strip USE 1 STRIP BY IN VITRO ROUTE 2 TIMES DAILY OR AS DIRECTED  Qty: 200 strip, Refills: 0    Associated Diagnoses: Type 2 diabetes mellitus with diabetic nephropathy (H)      ACE/ARB/ARNI NOT PRESCRIBED (INTENTIONAL) Please choose reason not prescribed, below      ASPIRIN NOT PRESCRIBED (INTENTIONAL) Please choose reason not prescribed, below    Associated Diagnoses: Peripheral vascular disease (H)      blood glucose monitoring (NO BRAND SPECIFIED) meter device kit Accucheck Mariza meter; verified with pharmacist  Qty: 1 kit, Refills: 0    Comments: Pt reports machine is broke/not working again. Advised pt to bring machine into pharmacy to make sure she gets right dispensed  machine.  Associated Diagnoses: Type 2 diabetes mellitus with diabetic nephropathy (H)           Allergies   Allergies   Allergen Reactions     Ambien [Zolpidem Tartrate] Visual Disturbance     Hallucinations and fell out of bed at night.     Penicillins Hives     Definity      Caused a syncopal episode.     Sulfa Drugs Itching     Cymbalta Rash     Fluconazole Rash     Tolerates miconazole suppositories

## 2019-05-10 NOTE — DISCHARGE INSTRUCTIONS
Patient will discharge to [facility] today via [facility] wheelchair with a portable oxygen tank from [facility] at 16:00.  [facility]'s phone number is [phone].

## 2019-05-10 NOTE — PLAN OF CARE
Pt. Is Day #8 - Discharging to TCU - ? Watchman procedure as Outpatient.   Requires O2 - in room ready to go.   Assessment intact. VSS. V-paced rhythm.   Chronic back pain and patient given Ultr[employer] for discharge ride.   [facility] to transport

## 2019-05-11 NOTE — PLAN OF CARE
Occupational Therapy Discharge Summary    Reason for therapy discharge:    Discharged to transitional care facility.    Progress towards therapy goal(s). See goals on Care Plan in [facility] electronic health record for goal details.  Goals not met.  Barriers to achieving goals:   limited tolerance for therapy and discharge from facility.    Therapy recommendation(s):    Continued therapy is recommended.  Rationale/Recommendations:  Continued OT intervention at TCU would be beneficial to pt's recovery process to ensure a full and safe return to PLOF with ADLs.

## 2019-05-11 NOTE — PLAN OF CARE
Physical Therapy Discharge Summary    Reason for therapy discharge:    Discharged to transitional care facility.    Progress towards therapy goal(s). See goals on Care Plan in [facility] electronic health record for goal details.  Goals not met.  Barriers to achieving goals:   discharge from facility.    Therapy recommendation(s):    Continued therapy is recommended.  Rationale/Recommendations:  cont PT at TCU to further increase strength, independence, and mobility.

## 2019-05-13 NOTE — PROGRESS NOTES
Clinic Care Coordination Contact    Care Coordination Transition Communication  Referral Source: IP Handoff    Clinical Data: Patient was hospitalized at [facility] from 5/3/19 to 5/10/19 with diagnosis of acute diastolic congestive heart failure exacerbation; healthcare acquired pneumonia; generalized weakness. Patient was to have a Watchman, which was cancelled.  Other diagnoses at discharge:    COPD    Severe kyphosis with restrictive lung disease    Chronic atrial fibrillation status post ablation and pacemaker placement    HTN    Hyperlipidemia    CKD III-IV    Anemia, with history of colonic angioid ectasias    Diabetes mellitus type 2    Hx of Bell's Palsy    GERD    Pruritis    constipation    Transition to Facility:  Facility Name: [facility]  Contact name and phone number: Zoya Santoro Sara, LSWs, [phone]    Plan: RN Care Coordinator will await notification from facility's staff informing of patient's discharge plans/needs. RN Care Coordinator will review chart and outreach to facility's staff every 4 weeks and/or as needed.       Cally Campbell RN Care Coordinator  [facility]  Phone:  [phone] (Mondays, Wednesdays & Fridays)  Phone:  [phone] (Tuesdays & Thursdays)  Email: [email or web address]

## 2019-05-13 NOTE — LETTER
To:  [facility]    Attn:   Zoya Santoro        Patient s name:     Helene Gibbs                                                                                Patient s :      [id number]                              Admission date:   05/10/2019       Dear Social Workers,      Please contact me when the patient is going to be discharged or move to long term care. If the patient is being discharged with Home Care services, please provide the name of the agency.    Please include the discharge date, disposition and main diagnosis, if you will.    If a care conference is going to be held, please let me know as well.    The information needed can be given by e-mail or a phone call.    Thank you!        Cally Capmbell RN Clinic Care Coordinator  [facility]  Phone: [phone]         e-mail: [email or web address]

## 2019-05-13 NOTE — TELEPHONE ENCOUNTER
Patient was evaluated by cardiology while inpatient for CHF exacerbation, in presence of moderate to severe TR and RV dilatation, severe pulmonary HTN. Pt was scheduled for Watchman device 5/7/19, but then ultimately declined to proceed with procedure secondary to fatigue and weakness. Will reschedule on OP basis. RN called [facility] to confirm the follow up plan for patient with Care Coordinator. RN confirmed with Care Coordinator that patient has a scheduled F/U appt on 5/22/19 with [facility], with labs prior. RN confirmed with Care Coordinator that patient is weighed daily, to call clinic with a weight gain of 2 lbs overnight or 5 lbs in a week and to bring list of daily weights/vitals, last progress note, MAR, active orders, and provider order sheet to appt. BRENT Alba RN.

## 2019-05-13 NOTE — LETTER
5/13/2019        RE: Helene Gibbs  [address] [facility] [address]        [facility]  PRIMARY CARE PROVIDER AND CLINIC:  Neisha Esparza MD, [address] [facility] [address] / [facility] [address]  Chief Complaint   Patient presents with     Hospital F/U     [place] Medical Record Number:  [id number]  Place of Service where encounter took place:  [facility] [[id number]]    Helene Gibbs  is a 84 year old  (1935), admitted to the above facility from  [facility]. Hospital stay 5/3/19 through 5/10/19..  Admitted to this facility for  rehab, medical management and nursing care.    HPI:    HPI information obtained from: facility chart records, facility staff, patient report and [facility] chart review.   Brief Summary of Hospital Course:   Acute/chronic diastolic CHF: diuresed lasix initially then transitioned back to torsemide but increased from PTA dose, torsemide decreased back to PTA dose due to rise in creat.   HCAP/COPD: levaquin for 10 days,   Atrial fib: PPM in place: AC contraindicated due to colonic angioid angectasias and GI bleeding, although apixaban resumed, EP for OP watchman device  CKD: baseline creat 1.3-2.2 over past 6 months, on admission creat 1.84--> 2.06  Anemia: Hgb 9.5 on admission, stable  Updates on Status Since Skilled nursing Admission:     CODE STATUS/ADVANCE DIRECTIVES DISCUSSION:   DNR / DNI  Patient's living condition: lives alone  ALLERGIES: Ambien [zolpidem tartrate]; Penicillins; Definity; Sulfa drugs; Cymbalta; and Fluconazole  PAST MEDICAL HISTORY:  has a past medical history of Anemia, Ankle arthritis, Arthritis, Back pain, Bell's palsy (9/08), Breast CA (H) (2004-), Breast cancer (H) (2004), CKD (chronic kidney disease), Colon polyps, Congestive heart failure (H), COPD (chronic obstructive pulmonary disease) (H), Coronary artery disease, DM (diabetes mellitus) (H), GERD (gastroesophageal reflux disease),  Hyperlipidemia, Hypertension, Lumbar spinal stenosis, Nicotine dependence, Obesity, Osteoporosis, Other chronic pain, Pacemaker, Permanent atrial fibrillation (H) (8/2008), Pleural effusion, Pulmonary hypertension (H), PVD (peripheral vascular disease) (H), Rectal stenosis, Tobacco abuse, and Tricuspid regurgitation. She also has no past medical history of Difficult intubation, Malignant hyperthermia, or PONV (postoperative nausea and vomiting).  PAST SURGICAL HISTORY:   has a past surgical history that includes arthroscopy shoulder rt/lt (1999, 2004); joint replacemtn, knee rt/lt; COLONOSCOPY THRU STOMA, DIAGNOSTIC (? 2005); MAMMOGRAM, SCREENING (1/2009); DEXA, BONE DENSITY, AXIAL SKEL; Lumpectomy breast; Implant pacemaker (10/2012); EP Ablation AV node (10/2012); Phacoemulsification clear cornea with standard intraocular lens implant (Left, 7/16/2015); Laparoscopic cholecystectomy with cholangiograms (N/A, 12/14/2015); Colonoscopy (N/A, 10/7/2016); Phacoemulsification clear cornea with toric intraocular lens implant (Right, 5/9/2017); Esophagoscopy, gastroscopy, duodenoscopy (EGD), combined (N/A, 8/10/2017); Bone marrow biopsy, bone specimen, needle/trocar (N/A, 8/29/2017); Arthroplasty hip (Left, 10/20/2018); Irrigation and debridement lower extremity, combined (Left, 12/21/2018); Esophagoscopy, gastroscopy, duodenoscopy (EGD), combined (N/A, 2/15/2019); and Colonoscopy (N/A, 2/15/2019).  FAMILY HISTORY: family history includes C.A.D. in her father; Diabetes in her mother; Hypertension in her mother.  SOCIAL HISTORY:   reports that she quit smoking about a year ago. Her smoking use included cigarettes. She has a 11.25 pack-year smoking history. She has never used smokeless tobacco. She reports that she does not drink alcohol or use drugs.    Post Discharge Medication Reconciliation Status: discharge medications reconciled, continue medications without change    Current Outpatient Medications   Medication Sig  Dispense Refill     ACCU-CHEK SMARTVIEW test strip USE 1 STRIP BY IN VITRO ROUTE 2 TIMES DAILY OR AS DIRECTED 200 strip 0     ACE/ARB/ARNI NOT PRESCRIBED (INTENTIONAL) Please choose reason not prescribed, below       albuterol (PROAIR HFA, PROVENTIL HFA, VENTOLIN HFA) 108 (90 BASE) MCG/ACT inhaler Inhale 2 puffs into the lungs every 4 hours as needed for shortness of breath / dyspnea or wheezing 1 Inhaler 5     apixaban ANTICOAGULANT (ELIQUIS) 2.5 MG tablet Take 1 tablet (2.5 mg) by mouth 2 times daily 60 tablet 1     ASPIRIN NOT PRESCRIBED (INTENTIONAL) Please choose reason not prescribed, below       bisacodyl (DULCOLAX) 5 MG EC tablet Take 10 mg by mouth At Bedtime       blood glucose monitoring (NO BRAND SPECIFIED) meter device kit Accucheck Mariza meter; verified with pharmacist 1 kit 0     fluticasone (FLONASE) 50 MCG/ACT nasal spray Spray 2 sprays into both nostrils daily (Patient taking differently: Spray 2 sprays into both nostrils every evening ) 15.8 mL 11     glipiZIDE (GLUCOTROL) 5 MG tablet Take 1 tablet (5 mg) by mouth every morning (before breakfast) 90 tablet 1     guaiFENesin (MUCINEX) 600 MG 12 hr tablet Take 1 tablet (600 mg) by mouth 2 times daily for 7 days       hydrOXYzine (VISTARIL) 25 MG capsule Take 1 capsule (25 mg) by mouth 2 times daily as needed for itching 180 capsule 1     hypromellose (ARTIFICIAL TEARS) 0.5 % SOLN ophthalmic solution Place 1 drop into both eyes every hour as needed for dry eyes       levofloxacin (LEVAQUIN) 750 MG tablet Take 1 tablet (750 mg) by mouth every other day for 3 doses       lidocaine (LIDODERM) 5 % patch Place 1 patch onto the skin daily as needed back       metoprolol tartrate (LOPRESSOR) 25 MG tablet TAKE 1 TABLET BY MOUTH TWICE A DAY 90 tablet 1     pregabalin (LYRICA) 75 MG capsule Take 1 capsule (75 mg) by mouth 2 times daily 10 capsule 1     PROCTOSOL HC 2.5 % cream PLACE RECTALLY 2 TIMES DAILY AS NEEDED FOR HEMORRHOIDS 28.35 g 1     ranitidine  (ZANTAC) 150 MG tablet Take 1 tablet (150 mg) by mouth At Bedtime (Patient taking differently: Take 150 mg by mouth daily ) 90 tablet 3     simvastatin (ZOCOR) 10 MG tablet Take 1 tablet (10 mg) by mouth At Bedtime 90 tablet 3     spironolactone (ALDACTONE) 25 MG tablet Take 25 mg by mouth daily       tiotropium (SPIRIVA) 18 MCG inhaled capsule Inhale 1 capsule (18 mcg) into the lungs daily 90 capsule 1     torsemide (DEMADEX) 20 MG tablet TAKE 1 TABLET BY MOUTH EVERY DAY 90 tablet 1     traMADol (ULTRAM) 50 MG tablet Take 1 tablet (50 mg) by mouth every 6 hours as needed for severe pain 12 tablet      betamethasone valerate (VALISONE) 0.1 % external cream APPLY TOPICALLY 2 TIMES DAILY USE SPARINGLY (Patient taking differently: APPLY TOPICALLY TO RASH UNDER BREAST DAILY USE SPARINGLY) 15 g 1     denosumab (PROLIA) 60 MG/ML SOLN injection Inject 1 mL (60 mg) Subcutaneous every 6 months 1 mL 1     spironolactone (ALDACTONE) 25 MG tablet Take 1-2 tablets (25-50 mg) by mouth daily (Patient not taking: Reported on 5/12/2019) 60 tablet 3       ROS:  10 point ROS of systems including Constitutional, Eyes, Respiratory, Cardiovascular, Gastroenterology, Genitourinary, Integumentary, Musculoskeletal, Psychiatric were all negative except for pertinent positives noted in my HPI.    Vitals:  BP (P) 108/67   Pulse 68   Temp 96.7  F (35.9  C)   Resp 14   Wt (P) 73.8 kg (162 lb 12.8 oz)   LMP  (LMP Unknown)   SpO2 96%   BMI (P) 27.09 kg/m     Exam:  GENERAL APPEARANCE:  Alert, in no distress, morbidly obese  ENT:  Mouth and posterior oropharynx normal, moist mucous membranes, [place]  EYES:  EOM, conjunctivae, lids, pupils and irises normal, PERRL  RESP:  respiratory effort and palpation of chest normal, lungs clear to auscultation , no respiratory distress, diminished breath sounds bases bilaterally with fine crackles bases bilaterally  CV:  Palpation and auscultation of heart done , regular rate and rhythm, no murmur, rub, or  gallop, peripheral edema trace+ in LE bilaterally  ABDOMEN:  normal bowel sounds, soft, nontender, no hepatosplenomegaly or other masses  M/S:   Examination of:   right upper extremity, left upper extremity, right lower extremity and left lower extremity  Inspection, ROM, stability and muscle strength normal and generalized weakness noted  SKIN:  Inspection of skin and subcutaneous tissue baseline  NEURO:   Cranial nerves 2-12 are normal tested and grossly at patient's baseline, speech WNL  PSYCH:  affect and mood normal    Lab/Diagnostic data:  Recent labs in [facility] reviewed by me today.     ASSESSMENT/PLAN:  Current [place] scheduled appointments:  Next 5 appointments (look out 90 days)    May 22, 2019  3:50 PM CDT  Return Visit with ALLEN Arrington CNP  [facility] ([facility]) [address] [place] [id number] [phone] OPT 2         Acute on chronic diastolic CHF (congestive heart failure) (H)  Essential hypertension  Chronic atrial fib, S/P ablation -- on Eliquis  Physical deconditioning  Acute/ongoing: daily weights, vitals daily and prn, BMP twice weekly, continue torsemide 20mg QD, ,aldactone 25mg QD, metoprolol 25mg BID, apixaban 2.5mg BID,   F/u with cardiology [facility] on 5/22/19    CKD (chronic kidney disease) stage 4, GFR 15-29 ml/min (H)  Ongoing: baseline creat 1.3-2.2  BMP twice weekly    HCAP (healthcare-associated pneumonia)  COPD  Acute/ongoing: monitor SAO2 at rest and with activity, wean off O2 during the day, continue levaquin 750mg QD for 3 more doses, mucinex 600mg q 12 hours for 7 more days, continue albuterol MDI 2 puffs q 4 hours prn, spiriva 18mcg QD    Iron deficiency anemia due to chronic blood loss  Ongoing: Hgb during hospitalization 9.5, Hgb twice weekly  Hx of GI bleed, patient on eliquis    Type 2 diabetes mellitus with diabetic nephropathy, without long-term current use of insulin (H)  Ongoing: BMP twice weekly,  glipizide 5mg QAM    Chronic low back pain with sciatica, sciatica laterality unspecified, unspecified back pain laterality  Ongoing: PT and OT for strengthening, continue lyrica 75mg BID and tramadol 50mg q 6 hours prn       Orders written by provider at facility  Wean off O2 during the day  BMP and Hgb twice weekly  Tramadol 50mg q 6 hours prn      Total time spent with patient visit at the [facility] nursing facility was 45 min including patient visit and review of past records. Greater than 50% of total time spent with counseling and coordinating care due to update orders  Electronically signed by:  Tonya Lynn Haase, APRN CNP                         Sincerely,        Tonya Lynn Haase, APRN CNP

## 2019-05-13 NOTE — PROGRESS NOTES
[facility]  PRIMARY CARE PROVIDER AND CLINIC:  Neisha Esparza MD, [address] / [place]                [place] [address]  Chief Complaint   Patient presents with     Hospital F/U     [place] Medical Record Number:  [id number]  Place of Service where encounter took place:  [facility] ([facility] [[id number]]    Helene Gibbs  is a 84 year old  (1935), admitted to the above facility from  [facility]. Hospital stay 5/3/19 through 5/10/19..  Admitted to this facility for  rehab, medical management and nursing care.    HPI:    HPI information obtained from: facility chart records, facility staff, patient report and [facility] chart review.   Brief Summary of Hospital Course:   Acute/chronic diastolic CHF: diuresed lasix initially then transitioned back to torsemide but increased from PTA dose, torsemide decreased back to PTA dose due to rise in creat.   HCAP/COPD: levaquin for 10 days,   Atrial fib: PPM in place: AC contraindicated due to colonic angioid angectasias and GI bleeding, although apixaban resumed, EP for OP watchman device  CKD: baseline creat 1.3-2.2 over past 6 months, on admission creat 1.84--> 2.06  Anemia: Hgb 9.5 on admission, stable  Updates on Status Since Skilled nursing Admission:     CODE STATUS/ADVANCE DIRECTIVES DISCUSSION:   DNR / DNI  Patient's living condition: lives alone  ALLERGIES: Ambien [zolpidem tartrate]; Penicillins; Definity; Sulfa drugs; Cymbalta; and Fluconazole  PAST MEDICAL HISTORY:  has a past medical history of Anemia, Ankle arthritis, Arthritis, Back pain, Bell's palsy (9/08), Breast CA (H) (2004-), Breast cancer (H) (2004), CKD (chronic kidney disease), Colon polyps, Congestive heart failure (H), COPD (chronic obstructive pulmonary disease) (H), Coronary artery disease, DM (diabetes mellitus) (H), GERD (gastroesophageal reflux disease), Hyperlipidemia, Hypertension, Lumbar spinal stenosis, Nicotine dependence, Obesity,  Osteoporosis, Other chronic pain, Pacemaker, Permanent atrial fibrillation (H) (8/2008), Pleural effusion, Pulmonary hypertension (H), PVD (peripheral vascular disease) (H), Rectal stenosis, Tobacco abuse, and Tricuspid regurgitation. She also has no past medical history of Difficult intubation, Malignant hyperthermia, or PONV (postoperative nausea and vomiting).  PAST SURGICAL HISTORY:   has a past surgical history that includes arthroscopy shoulder rt/lt (1999, 2004); joint replacemtn, knee rt/lt; COLONOSCOPY THRU STOMA, DIAGNOSTIC (? 2005); MAMMOGRAM, SCREENING (1/2009); DEXA, BONE DENSITY, AXIAL SKEL; Lumpectomy breast; Implant pacemaker (10/2012); EP Ablation AV node (10/2012); Phacoemulsification clear cornea with standard intraocular lens implant (Left, 7/16/2015); Laparoscopic cholecystectomy with cholangiograms (N/A, 12/14/2015); Colonoscopy (N/A, 10/7/2016); Phacoemulsification clear cornea with toric intraocular lens implant (Right, 5/9/2017); Esophagoscopy, gastroscopy, duodenoscopy (EGD), combined (N/A, 8/10/2017); Bone marrow biopsy, bone specimen, needle/trocar (N/A, 8/29/2017); Arthroplasty hip (Left, 10/20/2018); Irrigation and debridement lower extremity, combined (Left, 12/21/2018); Esophagoscopy, gastroscopy, duodenoscopy (EGD), combined (N/A, 2/15/2019); and Colonoscopy (N/A, 2/15/2019).  FAMILY HISTORY: family history includes C.A.D. in her father; Diabetes in her mother; Hypertension in her mother.  SOCIAL HISTORY:   reports that she quit smoking about a year ago. Her smoking use included cigarettes. She has a 11.25 pack-year smoking history. She has never used smokeless tobacco. She reports that she does not drink alcohol or use drugs.    Post Discharge Medication Reconciliation Status: discharge medications reconciled, continue medications without change    Current Outpatient Medications   Medication Sig Dispense Refill     ACCU-CHEK SMARTVIEW test strip USE 1 STRIP BY IN VITRO ROUTE 2 TIMES  DAILY OR AS DIRECTED 200 strip 0     ACE/ARB/ARNI NOT PRESCRIBED (INTENTIONAL) Please choose reason not prescribed, below       albuterol (PROAIR HFA, PROVENTIL HFA, VENTOLIN HFA) 108 (90 BASE) MCG/ACT inhaler Inhale 2 puffs into the lungs every 4 hours as needed for shortness of breath / dyspnea or wheezing 1 Inhaler 5     apixaban ANTICOAGULANT (ELIQUIS) 2.5 MG tablet Take 1 tablet (2.5 mg) by mouth 2 times daily 60 tablet 1     ASPIRIN NOT PRESCRIBED (INTENTIONAL) Please choose reason not prescribed, below       bisacodyl (DULCOLAX) 5 MG EC tablet Take 10 mg by mouth At Bedtime       blood glucose monitoring (NO BRAND SPECIFIED) meter device kit Accucheck Mariza meter; verified with pharmacist 1 kit 0     fluticasone (FLONASE) 50 MCG/ACT nasal spray Spray 2 sprays into both nostrils daily (Patient taking differently: Spray 2 sprays into both nostrils every evening ) 15.8 mL 11     glipiZIDE (GLUCOTROL) 5 MG tablet Take 1 tablet (5 mg) by mouth every morning (before breakfast) 90 tablet 1     guaiFENesin (MUCINEX) 600 MG 12 hr tablet Take 1 tablet (600 mg) by mouth 2 times daily for 7 days       hydrOXYzine (VISTARIL) 25 MG capsule Take 1 capsule (25 mg) by mouth 2 times daily as needed for itching 180 capsule 1     hypromellose (ARTIFICIAL TEARS) 0.5 % SOLN ophthalmic solution Place 1 drop into both eyes every hour as needed for dry eyes       levofloxacin (LEVAQUIN) 750 MG tablet Take 1 tablet (750 mg) by mouth every other day for 3 doses       lidocaine (LIDODERM) 5 % patch Place 1 patch onto the skin daily as needed back       metoprolol tartrate (LOPRESSOR) 25 MG tablet TAKE 1 TABLET BY MOUTH TWICE A DAY 90 tablet 1     pregabalin (LYRICA) 75 MG capsule Take 1 capsule (75 mg) by mouth 2 times daily 10 capsule 1     PROCTOSOL HC 2.5 % cream PLACE RECTALLY 2 TIMES DAILY AS NEEDED FOR HEMORRHOIDS 28.35 g 1     ranitidine (ZANTAC) 150 MG tablet Take 1 tablet (150 mg) by mouth At Bedtime (Patient taking differently:  Take 150 mg by mouth daily ) 90 tablet 3     simvastatin (ZOCOR) 10 MG tablet Take 1 tablet (10 mg) by mouth At Bedtime 90 tablet 3     spironolactone (ALDACTONE) 25 MG tablet Take 25 mg by mouth daily       tiotropium (SPIRIVA) 18 MCG inhaled capsule Inhale 1 capsule (18 mcg) into the lungs daily 90 capsule 1     torsemide (DEMADEX) 20 MG tablet TAKE 1 TABLET BY MOUTH EVERY DAY 90 tablet 1     traMADol (ULTRAM) 50 MG tablet Take 1 tablet (50 mg) by mouth every 6 hours as needed for severe pain 12 tablet      betamethasone valerate (VALISONE) 0.1 % external cream APPLY TOPICALLY 2 TIMES DAILY USE SPARINGLY (Patient taking differently: APPLY TOPICALLY TO RASH UNDER BREAST DAILY USE SPARINGLY) 15 g 1     denosumab (PROLIA) 60 MG/ML SOLN injection Inject 1 mL (60 mg) Subcutaneous every 6 months 1 mL 1     spironolactone (ALDACTONE) 25 MG tablet Take 1-2 tablets (25-50 mg) by mouth daily (Patient not taking: Reported on 5/12/2019) 60 tablet 3       ROS:  10 point ROS of systems including Constitutional, Eyes, Respiratory, Cardiovascular, Gastroenterology, Genitourinary, Integumentary, Musculoskeletal, Psychiatric were all negative except for pertinent positives noted in my HPI.    Vitals:  BP (P) 108/67   Pulse 68   Temp 96.7  F (35.9  C)   Resp 14   Wt (P) 73.8 kg (162 lb 12.8 oz)   LMP  (LMP Unknown)   SpO2 96%   BMI (P) 27.09 kg/m    Exam:  GENERAL APPEARANCE:  Alert, in no distress, morbidly obese  ENT:  Mouth and posterior oropharynx normal, moist mucous membranes, [place]  EYES:  EOM, conjunctivae, lids, pupils and irises normal, PERRL  RESP:  respiratory effort and palpation of chest normal, lungs clear to auscultation , no respiratory distress, diminished breath sounds bases bilaterally with fine crackles bases bilaterally  CV:  Palpation and auscultation of heart done , regular rate and rhythm, no murmur, rub, or gallop, peripheral edema trace+ in LE bilaterally  ABDOMEN:  normal bowel sounds, soft,  nontender, no hepatosplenomegaly or other masses  M/S:   Examination of:   right upper extremity, left upper extremity, right lower extremity and left lower extremity  Inspection, ROM, stability and muscle strength normal and generalized weakness noted  SKIN:  Inspection of skin and subcutaneous tissue baseline  NEURO:   Cranial nerves 2-12 are normal tested and grossly at patient's baseline, speech WNL  PSYCH:  affect and mood normal    Lab/Diagnostic data:  Recent labs in [facility] reviewed by me today.     ASSESSMENT/PLAN:  Current [place] scheduled appointments:  Next 5 appointments (look out 90 days)    May 22, 2019  3:50 PM CDT  Return Visit with ALLEN Arrington CNP  [facility] ([facility]) [address]  [facility] [address] [phone] OPT 2         Acute on chronic diastolic CHF (congestive heart failure) (H)  Essential hypertension  Chronic atrial fib, S/P ablation -- on Eliquis  Physical deconditioning  Acute/ongoing: daily weights, vitals daily and prn, BMP twice weekly, continue torsemide 20mg QD, ,aldactone 25mg QD, metoprolol 25mg BID, apixaban 2.5mg BID,   F/u with cardiology [facility] on 5/22/19    CKD (chronic kidney disease) stage 4, GFR 15-29 ml/min (H)  Ongoing: baseline creat 1.3-2.2  BMP twice weekly    HCAP (healthcare-associated pneumonia)  COPD  Acute/ongoing: monitor SAO2 at rest and with activity, wean off O2 during the day, continue levaquin 750mg QD for 3 more doses, mucinex 600mg q 12 hours for 7 more days, continue albuterol MDI 2 puffs q 4 hours prn, spiriva 18mcg QD    Iron deficiency anemia due to chronic blood loss  Ongoing: Hgb during hospitalization 9.5, Hgb twice weekly  Hx of GI bleed, patient on eliquis    Type 2 diabetes mellitus with diabetic nephropathy, without long-term current use of insulin (H)  Ongoing: BMP twice weekly, glipizide 5mg QAM    Chronic low back pain with sciatica, sciatica laterality  unspecified, unspecified back pain laterality  Ongoing: PT and OT for strengthening, continue lyrica 75mg BID and tramadol 50mg q 6 hours prn       Orders written by provider at facility  Wean off O2 during the day  BMP and Hgb twice weekly  Tramadol 50mg q 6 hours prn      Total time spent with patient visit at the skilled nursing facility was 45 min including patient visit and review of past records. Greater than 50% of total time spent with counseling and coordinating care due to update orders  Electronically signed by:  Tonya Lynn Haase, APRN CNP

## 2019-05-22 NOTE — PROGRESS NOTES
HPI and Plan: #[id number]  See dictation    Orders Placed This Encounter   Procedures     Basic metabolic panel     Follow-Up with Cardiac Advanced Practice Provider       No orders of the defined types were placed in this encounter.      Medications Discontinued During This Encounter   Medication Reason     spironolactone (ALDACTONE) 25 MG tablet Medication Reconciliation Clean Up         Encounter Diagnoses   Name Primary?     Congestive heart failure, unspecified HF chronicity, unspecified heart failure type (H)      Permanent atrial fibrillation (H) Yes       CURRENT MEDICATIONS:  Current Outpatient Medications   Medication Sig Dispense Refill     albuterol (PROAIR HFA, PROVENTIL HFA, VENTOLIN HFA) 108 (90 BASE) MCG/ACT inhaler Inhale 2 puffs into the lungs every 4 hours as needed for shortness of breath / dyspnea or wheezing 1 Inhaler 5     apixaban ANTICOAGULANT (ELIQUIS) 2.5 MG tablet Take 1 tablet (2.5 mg) by mouth 2 times daily 60 tablet 1     betamethasone valerate (VALISONE) 0.1 % external cream APPLY TOPICALLY 2 TIMES DAILY USE SPARINGLY (Patient taking differently: APPLY TOPICALLY TO RASH UNDER BREAST DAILY USE SPARINGLY) 15 g 1     bisacodyl (DULCOLAX) 5 MG EC tablet Take 10 mg by mouth At Bedtime       blood glucose monitoring (NO BRAND SPECIFIED) meter device kit Accucheck Mariza meter; verified with pharmacist 1 kit 0     fluticasone (FLONASE) 50 MCG/ACT nasal spray Spray 2 sprays into both nostrils daily (Patient taking differently: Spray 2 sprays into both nostrils every evening ) 15.8 mL 11     glipiZIDE (GLUCOTROL) 5 MG tablet Take 1 tablet (5 mg) by mouth every morning (before breakfast) 90 tablet 1     hydrOXYzine (VISTARIL) 25 MG capsule Take 1 capsule (25 mg) by mouth 2 times daily as needed for itching 180 capsule 1     lidocaine (LIDODERM) 5 % patch Place 1 patch onto the skin daily as needed back       metoprolol tartrate (LOPRESSOR) 25 MG tablet TAKE 1 TABLET BY MOUTH TWICE A DAY 90 tablet 1      pregabalin (LYRICA) 75 MG capsule Take 1 capsule (75 mg) by mouth 2 times daily 10 capsule 1     PROCTOSOL HC 2.5 % cream PLACE RECTALLY 2 TIMES DAILY AS NEEDED FOR HEMORRHOIDS 28.35 g 1     ranitidine (ZANTAC) 150 MG tablet Take 1 tablet (150 mg) by mouth At Bedtime (Patient taking differently: Take 150 mg by mouth daily ) 90 tablet 3     simvastatin (ZOCOR) 10 MG tablet Take 1 tablet (10 mg) by mouth At Bedtime 90 tablet 3     tiotropium (SPIRIVA) 18 MCG inhaled capsule Inhale 1 capsule (18 mcg) into the lungs daily 90 capsule 1     torsemide (DEMADEX) 20 MG tablet TAKE 1 TABLET BY MOUTH EVERY DAY 90 tablet 1     traMADol (ULTRAM) 50 MG tablet Take 1 tablet (50 mg) by mouth every 6 hours as needed for severe pain 12 tablet      ACCU-CHEK SMARTVIEW test strip USE 1 STRIP BY IN VITRO ROUTE 2 TIMES DAILY OR AS DIRECTED 200 strip 0     ACE/ARB/ARNI NOT PRESCRIBED (INTENTIONAL) Please choose reason not prescribed, below       ASPIRIN NOT PRESCRIBED (INTENTIONAL) Please choose reason not prescribed, below (Patient not taking: Reported on 5/22/2019)       denosumab (PROLIA) 60 MG/ML SOLN injection Inject 1 mL (60 mg) Subcutaneous every 6 months (Patient not taking: Reported on 5/22/2019) 1 mL 1     hypromellose (ARTIFICIAL TEARS) 0.5 % SOLN ophthalmic solution Place 1 drop into both eyes every hour as needed for dry eyes         ALLERGIES     Allergies   Allergen Reactions     Ambien [Zolpidem Tartrate] Visual Disturbance     Hallucinations and fell out of bed at night.     Penicillins Hives     Definity      Caused a syncopal episode.     Sulfa Drugs Itching     Cymbalta Rash     Fluconazole Rash     Tolerates miconazole suppositories       PAST MEDICAL HISTORY:  Past Medical History:   Diagnosis Date     Anemia      Ankle arthritis      Arthritis      Back pain      Bell's palsy 9/08    left     Breast CA (H) 2004-    left lumpectomy, radiation, -recurrence     Breast cancer (H) 2004    Left breast lumpectomy w LN  disection, and radiation therapy     CKD (chronic kidney disease)     stage 3 baseline Cr 1.3     Colon polyps     colonoscopy-9/12-next due in 9/13     Congestive heart failure (H)      COPD (chronic obstructive pulmonary disease) (H)      Coronary artery disease      DM (diabetes mellitus) (H)      GERD (gastroesophageal reflux disease)      Hyperlipidemia      Hypertension      Lumbar spinal stenosis     use cane     Nicotine dependence      Obesity      Osteoporosis      Other chronic pain      Pacemaker      Permanent atrial fibrillation (H) 8/2008    S/P AV node ablation and pacemaker 10-25-12       Pleural effusion      Pulmonary hypertension (H)      PVD (peripheral vascular disease) (H)      Rectal stenosis      Tobacco abuse     current     Tricuspid regurgitation        PAST SURGICAL HISTORY:  Past Surgical History:   Procedure Laterality Date     ARTHROPLASTY HIP Left 10/20/2018    Procedure: LEFT HIP HERACLIO-ARTHROPLASTY ;  Surgeon: Ish Fish MD;  Location:  OR     ARTHROSCOPY SHOULDER RT/LT  1999, 2004    Bilateral, right then left     BONE MARROW BIOPSY, BONE SPECIMEN, NEEDLE/TROCAR N/A 8/29/2017    Procedure: BIOPSY BONE MARROW;  BONE MARROW BIOPSY;  Surgeon: Marino Cohen MD;  Location: [facility]     C DEXA, BONE DENSITY, AXIAL SKEL       C MAMMOGRAM, SCREENING  1/2009     COLONOSCOPY N/A 10/7/2016    Procedure: COMBINED COLONOSCOPY, SINGLE OR MULTIPLE BIOPSY/POLYPECTOMY BY BIOPSY;  Surgeon: Cassandra Mccord MD;  Location:  GI     COLONOSCOPY N/A 2/15/2019    Procedure: COLONOSCOPY;  Surgeon: Juan Pablo Hayden DO;  Location: [facility]     ESOPHAGOSCOPY, GASTROSCOPY, DUODENOSCOPY (EGD), COMBINED N/A 8/10/2017    Procedure: COMBINED ESOPHAGOSCOPY, GASTROSCOPY, DUODENOSCOPY (EGD), BIOPSY SINGLE OR MULTIPLE;  gastroscopy;  Surgeon: Helene Bustamante MD;  Location: [facility]     ESOPHAGOSCOPY, GASTROSCOPY, DUODENOSCOPY (EGD), COMBINED N/A 2/15/2019    Procedure: COMBINED ESOPHAGOSCOPY,  GASTROSCOPY, DUODENOSCOPY (EGD);  Surgeon: Juan Pablo Hayden DO;  Location:  GI     H ABLATION AV NODE  10/2012     HC COLONOSCOPY THRU STOMA, DIAGNOSTIC  ?      IMPLANT PACEMAKER  10/2012    [place] [id number], [id number]     IRRIGATION AND DEBRIDEMENT LOWER EXTREMITY, COMBINED Left 2018    Procedure: INCISION AND DRAINAGE OF LEFT HIP WITH EXCISION OF BONE FRAGMENT;  Surgeon: Ish Fish MD;  Location:  OR     JOINT REPLACEMTN, KNEE RT/LT      Joint Replacement knee bilateral     LAPAROSCOPIC CHOLECYSTECTOMY WITH CHOLANGIOGRAMS N/A 2015    Procedure: LAPAROSCOPIC CHOLECYSTECTOMY WITH CHOLANGIOGRAMS;  Surgeon: Ervin Amos MD;  Location:  OR     LUMPECTOMY BREAST      Left-     PHACOEMULSIFICATION CLEAR CORNEA WITH STANDARD INTRAOCULAR LENS IMPLANT Left 2015    Procedure: PHACOEMULSIFICATION CLEAR CORNEA WITH STANDARD INTRAOCULAR LENS IMPLANT;  Surgeon: Sai Obregon MD;  Location: [facility]     PHACOEMULSIFICATION CLEAR CORNEA WITH TORIC INTRAOCULAR LENS IMPLANT Right 2017    Procedure: PHACOEMULSIFICATION CLEAR CORNEA WITH TORIC INTRAOCULAR LENS IMPLANT;  RIGHT EYE PHACOEMULSIFICATION CLEAR CORNEA WITH TORIC INTRAOCULAR LENS IMPLANT ;  Surgeon: Duc Richmond MD;  Location:  EC       FAMILY HISTORY:  Family History   Problem Relation Age of Onset     Hypertension Mother      Diabetes Mother          at 83 yrs     C.A.D. Father          age 70s     Breast Cancer No family hx of      Colon Cancer No family hx of        SOCIAL HISTORY:  Social History     Socioeconomic History     Marital status:      Spouse name: None     Number of children: 0     Years of education: None     Highest education level: None   Occupational History     None   Social Needs     Financial resource strain: None     Food insecurity:     Worry: None     Inability: None     Transportation needs:     Medical: None     Non-medical: None   Tobacco Use     Smoking status: Former  Smoker     Packs/day: 0.25     Years: 45.00     Pack years: 11.25     Types: Cigarettes     Last attempt to quit: 2018     Years since quittin.9     Smokeless tobacco: Never Used   Substance and Sexual Activity     Alcohol use: No     Alcohol/week: 0.0 oz     Drug use: No     Sexual activity: Not Currently     Partners: Female   Lifestyle     Physical activity:     Days per week: None     Minutes per session: None     Stress: None   Relationships     Social connections:     Talks on phone: None     Gets together: None     Attends Sabianism service: None     Active member of club or organization: None     Attends meetings of clubs or organizations: None     Relationship status: None     Intimate partner violence:     Fear of current or ex partner: None     Emotionally abused: None     Physically abused: None     Forced sexual activity: None   Other Topics Concern     Parent/sibling w/ CABG, MI or angioplasty before 65F 55M? Not Asked      Service Not Asked     Blood Transfusions Not Asked     Caffeine Concern Yes     Comment: 2 cups/day     Occupational Exposure Not Asked     Hobby Hazards Not Asked     Sleep Concern Yes     Stress Concern Yes     Weight Concern Yes     Comment: 15+ lb weight loss since hospitalization      Special Diet Yes     Comment: bland diet r/t cholecystectomy, low salt      Back Care Not Asked     Exercise No     Bike Helmet Not Asked     Seat Belt Yes     Self-Exams Not Asked   Social History Narrative    ,no childrenPOA- niece-Enedelia silvestre.  Lived in [place] and [place] worked in [employer], [employer].  Desires DNR/DNI.  (last updated 2018)        Review of Systems:  Skin:  Negative       Eyes:  Positive for glasses    ENT:  Negative      Respiratory:  Positive for dyspnea on exertion O2 at night and PRN   Cardiovascular:  Negative;syncope or near-syncope;palpitations;chest pain;cyanosis exercise intolerance;Positive for;fatigue;lightheadedness;edema   "  Gastroenterology: Positive for constipation    Genitourinary:  Negative      Musculoskeletal:  Positive for back pain;arthritis    Neurologic:  Positive for numbness or tingling of feet;numbness or tingling of hands    Psychiatric:  Negative      Heme/Lymph/Imm:  Negative      Endocrine:  Positive for diabetes      Physical Exam:  Vitals: /75   Pulse 68   Ht 1.651 m (5' 5\")   Wt 78.6 kg (173 lb 3.2 oz)   LMP  (LMP Unknown)   SpO2 94%   BMI 28.82 kg/m      Constitutional:  cooperative, alert and oriented, well developed, well nourished, in no acute distress        Skin:  warm and dry to the touch, no apparent skin lesions or masses noted   pacemaker incision in the left infraclavicular area was well-healed      Head:  normocephalic, no masses or lesions        Eyes:  conjunctivae and lids unremarkable        Lymph:      ENT:  no pallor or cyanosis, dentition good        Neck:  JVP normal        Respiratory:    diminished breath sounds bilaterally       Cardiac: regular rhythm;normal S1 and S2;no murmurs, gallops or rubs detected                not assessed this visit                                        GI:  abdomen soft        Extremities and Muscular Skeletal:    kyphosis bilateral LE edema;trace          Neurological:  no gross motor deficits;affect appropriate        Psych:  Alert and Oriented x 3;affect appropriate, oriented to time, person and place        CC  Neisha Esparza MD  [id number]  [place] [id number]              "

## 2019-05-22 NOTE — PATIENT INSTRUCTIONS
Call my nurse with any questions or concerns:  [phone]  *If you have concerns after hours, please call [phone], option 2 to speak with on call Cardiologist.    1. Medication changes from today:  None  See me in 4-6 weeks  Watch your diet and weight daily at home. You are up almost 8 lbs      2. Lab Results:   Component      Latest Ref Rng & Units 5/22/2019   Sodium      136 - 145 mmol/L 133 (L)   Potassium      3.5 - 5.1 mmol/L 5.4 (H)   Chloride      98 - 107 mmol/L 95 (L)   Carbon Dioxide      23 - 29 mmol/L 33 (H)   Anion Gap      6 - 17 mmol/L 10.4   Glucose      70 - 105 mg/dL 173 (H)   Urea Nitrogen      7 - 30 mg/dL 47 (H)   Creatinine      0.70 - 1.30 mg/dL 2.34 (H)   GFR Estimate      >60 mL/min/1.73:m2 20 (L)   GFR Estimate If Black      >60 mL/min/1.73:m2 24 (L)   Calcium      8.5 - 10.5 mg/dL 9.4   WBC      4.0 - 11.0 10e9/L 6.7   RBC Count      3.8 - 5.2 10e12/L 3.79 (L)   Hemoglobin      11.7 - 15.7 g/dL 9.2 (L)   Hematocrit      35.0 - 47.0 % 31.6 (L)   MCV      78 - 100 fl 83   MCH      26.5 - 33.0 pg 24.3 (L)   MCHC      31.5 - 36.5 g/dL 29.1 (L)   RDW      10.0 - 15.0 % 19.1 (H)   Platelet Count      150 - 450 10e9/L 214

## 2019-05-22 NOTE — LETTER
5/22/2019    Neisha Esparza MD  [address]  [facility] [address]    RE: Helene Gibbs       Dear Colleague,    I had the pleasure of seeing Helene Gibbs in the [facility].    HPI and Plan: #[id number]  See dictation    Orders Placed This Encounter   Procedures     Basic metabolic panel     Follow-Up with Cardiac Advanced Practice Provider       No orders of the defined types were placed in this encounter.      Medications Discontinued During This Encounter   Medication Reason     spironolactone (ALDACTONE) 25 MG tablet Medication Reconciliation Clean Up         Encounter Diagnoses   Name Primary?     Congestive heart failure, unspecified HF chronicity, unspecified heart failure type (H)      Permanent atrial fibrillation (H) Yes       CURRENT MEDICATIONS:  Current Outpatient Medications   Medication Sig Dispense Refill     albuterol (PROAIR HFA, PROVENTIL HFA, VENTOLIN HFA) 108 (90 BASE) MCG/ACT inhaler Inhale 2 puffs into the lungs every 4 hours as needed for shortness of breath / dyspnea or wheezing 1 Inhaler 5     apixaban ANTICOAGULANT (ELIQUIS) 2.5 MG tablet Take 1 tablet (2.5 mg) by mouth 2 times daily 60 tablet 1     betamethasone valerate (VALISONE) 0.1 % external cream APPLY TOPICALLY 2 TIMES DAILY USE SPARINGLY (Patient taking differently: APPLY TOPICALLY TO RASH UNDER BREAST DAILY USE SPARINGLY) 15 g 1     bisacodyl (DULCOLAX) 5 MG EC tablet Take 10 mg by mouth At Bedtime       blood glucose monitoring (NO BRAND SPECIFIED) meter device kit Accucheck Mariza meter; verified with pharmacist 1 kit 0     fluticasone (FLONASE) 50 MCG/ACT nasal spray Spray 2 sprays into both nostrils daily (Patient taking differently: Spray 2 sprays into both nostrils every evening ) 15.8 mL 11     glipiZIDE (GLUCOTROL) 5 MG tablet Take 1 tablet (5 mg) by mouth every morning (before breakfast) 90 tablet 1     hydrOXYzine (VISTARIL) 25 MG capsule Take 1 capsule (25 mg) by  mouth 2 times daily as needed for itching 180 capsule 1     lidocaine (LIDODERM) 5 % patch Place 1 patch onto the skin daily as needed back       metoprolol tartrate (LOPRESSOR) 25 MG tablet TAKE 1 TABLET BY MOUTH TWICE A DAY 90 tablet 1     pregabalin (LYRICA) 75 MG capsule Take 1 capsule (75 mg) by mouth 2 times daily 10 capsule 1     PROCTOSOL HC 2.5 % cream PLACE RECTALLY 2 TIMES DAILY AS NEEDED FOR HEMORRHOIDS 28.35 g 1     ranitidine (ZANTAC) 150 MG tablet Take 1 tablet (150 mg) by mouth At Bedtime (Patient taking differently: Take 150 mg by mouth daily ) 90 tablet 3     simvastatin (ZOCOR) 10 MG tablet Take 1 tablet (10 mg) by mouth At Bedtime 90 tablet 3     tiotropium (SPIRIVA) 18 MCG inhaled capsule Inhale 1 capsule (18 mcg) into the lungs daily 90 capsule 1     torsemide (DEMADEX) 20 MG tablet TAKE 1 TABLET BY MOUTH EVERY DAY 90 tablet 1     traMADol (ULTRAM) 50 MG tablet Take 1 tablet (50 mg) by mouth every 6 hours as needed for severe pain 12 tablet      [employer] SMARTVIEW test strip USE 1 STRIP BY IN VITRO ROUTE 2 TIMES DAILY OR AS DIRECTED 200 strip 0     ACE/ARB/ARNI NOT PRESCRIBED (INTENTIONAL) Please choose reason not prescribed, below       ASPIRIN NOT PRESCRIBED (INTENTIONAL) Please choose reason not prescribed, below (Patient not taking: Reported on 5/22/2019)       denosumab (PROLIA) 60 MG/ML SOLN injection Inject 1 mL (60 mg) Subcutaneous every 6 months (Patient not taking: Reported on 5/22/2019) 1 mL 1     hypromellose (ARTIFICIAL TEARS) 0.5 % SOLN ophthalmic solution Place 1 drop into both eyes every hour as needed for dry eyes         ALLERGIES     Allergies   Allergen Reactions     Ambien [Zolpidem Tartrate] Visual Disturbance     Hallucinations and fell out of bed at night.     Penicillins Hives     Definity      Caused a syncopal episode.     Sulfa Drugs Itching     Cymbalta Rash     Fluconazole Rash     Tolerates miconazole suppositories       PAST MEDICAL HISTORY:  Past Medical  History:   Diagnosis Date     Anemia      Ankle arthritis      Arthritis      Back pain      Bell's palsy 9/08    left     Breast CA (H) 2004-    left lumpectomy, radiation, -recurrence     Breast cancer (H) 2004    Left breast lumpectomy w LN disection, and radiation therapy     CKD (chronic kidney disease)     stage 3 baseline Cr 1.3     Colon polyps     colonoscopy-9/12-next due in 9/13     Congestive heart failure (H)      COPD (chronic obstructive pulmonary disease) (H)      Coronary artery disease      DM (diabetes mellitus) (H)      GERD (gastroesophageal reflux disease)      Hyperlipidemia      Hypertension      Lumbar spinal stenosis     use cane     Nicotine dependence      Obesity      Osteoporosis      Other chronic pain      Pacemaker      Permanent atrial fibrillation (H) 8/2008    S/P AV node ablation and pacemaker 10-25-12       Pleural effusion      Pulmonary hypertension (H)      PVD (peripheral vascular disease) (H)      Rectal stenosis      Tobacco abuse     current     Tricuspid regurgitation        PAST SURGICAL HISTORY:  Past Surgical History:   Procedure Laterality Date     ARTHROPLASTY HIP Left 10/20/2018    Procedure: LEFT HIP HERACLIO-ARTHROPLASTY ;  Surgeon: Ish Fish MD;  Location:  OR     ARTHROSCOPY SHOULDER RT/LT  1999, 2004    Bilateral, right then left     BONE MARROW BIOPSY, BONE SPECIMEN, NEEDLE/TROCAR N/A 8/29/2017    Procedure: BIOPSY BONE MARROW;  BONE MARROW BIOPSY;  Surgeon: Marino Cohen MD;  Location:  GI     C DEXA, BONE DENSITY, AXIAL SKEL       C MAMMOGRAM, SCREENING  1/2009     COLONOSCOPY N/A 10/7/2016    Procedure: COMBINED COLONOSCOPY, SINGLE OR MULTIPLE BIOPSY/POLYPECTOMY BY BIOPSY;  Surgeon: Cassandra Mccord MD;  Location:  GI     COLONOSCOPY N/A 2/15/2019    Procedure: COLONOSCOPY;  Surgeon: Juan Pablo Hayden DO;  Location:  GI     ESOPHAGOSCOPY, GASTROSCOPY, DUODENOSCOPY (EGD), COMBINED N/A 8/10/2017    Procedure: COMBINED ESOPHAGOSCOPY,  GASTROSCOPY, DUODENOSCOPY (EGD), BIOPSY SINGLE OR MULTIPLE;  gastroscopy;  Surgeon: Helene Bustamante MD;  Location:  GI     ESOPHAGOSCOPY, GASTROSCOPY, DUODENOSCOPY (EGD), COMBINED N/A 2/15/2019    Procedure: COMBINED ESOPHAGOSCOPY, GASTROSCOPY, DUODENOSCOPY (EGD);  Surgeon: Juan Pablo Hayden DO;  Location:  GI     H ABLATION AV NODE  10/2012     HC COLONOSCOPY THRU STOMA, DIAGNOSTIC  ?      IMPLANT PACEMAKER  10/2012    [place] [id number], [id number]     IRRIGATION AND DEBRIDEMENT LOWER EXTREMITY, COMBINED Left 2018    Procedure: INCISION AND DRAINAGE OF LEFT HIP WITH EXCISION OF BONE FRAGMENT;  Surgeon: Ish Fish MD;  Location:  OR     JOINT REPLACEMTN, KNEE RT/LT      Joint Replacement knee bilateral     LAPAROSCOPIC CHOLECYSTECTOMY WITH CHOLANGIOGRAMS N/A 2015    Procedure: LAPAROSCOPIC CHOLECYSTECTOMY WITH CHOLANGIOGRAMS;  Surgeon: Ervin Amos MD;  Location:  OR     LUMPECTOMY BREAST      Left-     PHACOEMULSIFICATION CLEAR CORNEA WITH STANDARD INTRAOCULAR LENS IMPLANT Left 2015    Procedure: PHACOEMULSIFICATION CLEAR CORNEA WITH STANDARD INTRAOCULAR LENS IMPLANT;  Surgeon: Sai Obregon MD;  Location:  EC     PHACOEMULSIFICATION CLEAR CORNEA WITH TORIC INTRAOCULAR LENS IMPLANT Right 2017    Procedure: PHACOEMULSIFICATION CLEAR CORNEA WITH TORIC INTRAOCULAR LENS IMPLANT;  RIGHT EYE PHACOEMULSIFICATION CLEAR CORNEA WITH TORIC INTRAOCULAR LENS IMPLANT ;  Surgeon: Duc Richmond MD;  Location:  EC       FAMILY HISTORY:  Family History   Problem Relation Age of Onset     Hypertension Mother      Diabetes Mother          at 83 yrs     C.A.D. Father          age 70s     Breast Cancer No family hx of      Colon Cancer No family hx of        SOCIAL HISTORY:  Social History     Socioeconomic History     Marital status:      Spouse name: None     Number of children: 0     Years of education: None     Highest education level: None    Occupational History     None   Social Needs     Financial resource strain: None     Food insecurity:     Worry: None     Inability: None     Transportation needs:     Medical: None     Non-medical: None   Tobacco Use     Smoking status: Former Smoker     Packs/day: 0.25     Years: 45.00     Pack years: 11.25     Types: Cigarettes     Last attempt to quit: 2018     Years since quittin.9     Smokeless tobacco: Never Used   Substance and Sexual Activity     Alcohol use: No     Alcohol/week: 0.0 oz     Drug use: No     Sexual activity: Not Currently     Partners: Female   Lifestyle     Physical activity:     Days per week: None     Minutes per session: None     Stress: None   Relationships     Social connections:     Talks on phone: None     Gets together: None     Attends Yarsani service: None     Active member of club or organization: None     Attends meetings of clubs or organizations: None     Relationship status: None     Intimate partner violence:     Fear of current or ex partner: None     Emotionally abused: None     Physically abused: None     Forced sexual activity: None   Other Topics Concern     Parent/sibling w/ CABG, MI or angioplasty before 65F 55M? Not Asked      Service Not Asked     Blood Transfusions Not Asked     Caffeine Concern Yes     Comment: 2 cups/day     Occupational Exposure Not Asked     Hobby Hazards Not Asked     Sleep Concern Yes     Stress Concern Yes     Weight Concern Yes     Comment: 15+ lb weight loss since hospitalization      Special Diet Yes     Comment: bland diet r/t cholecystectomy, low salt      Back Care Not Asked     Exercise No     Bike Helmet Not Asked     Seat Belt Yes     Self-Exams Not Asked   Social History Narrative    ,no childrenPOA- niece-Enedelia silvestre.  [place]ived in [place] and [place] in [employer].  Desires DNR/DNI.  (last updated 2018)        Review of Systems:  Skin:  Negative       Eyes:  Positive for glasses   "  ENT:  Negative      Respiratory:  Positive for dyspnea on exertion O2 at night and PRN   Cardiovascular:  Negative;syncope or near-syncope;palpitations;chest pain;cyanosis exercise intolerance;Positive for;fatigue;lightheadedness;edema    Gastroenterology: Positive for constipation    Genitourinary:  Negative      Musculoskeletal:  Positive for back pain;arthritis    Neurologic:  Positive for numbness or tingling of feet;numbness or tingling of hands    Psychiatric:  Negative      Heme/Lymph/Imm:  Negative      Endocrine:  Positive for diabetes      Physical Exam:  Vitals: /75   Pulse 68   Ht 1.651 m (5' 5\")   Wt 78.6 kg (173 lb 3.2 oz)   LMP  (LMP Unknown)   SpO2 94%   BMI 28.82 kg/m       Constitutional:  cooperative, alert and oriented, well developed, well nourished, in no acute distress        Skin:  warm and dry to the touch, no apparent skin lesions or masses noted   pacemaker incision in the left infraclavicular area was well-healed      Head:  normocephalic, no masses or lesions        Eyes:  conjunctivae and lids unremarkable        Lymph:      ENT:  no pallor or cyanosis, dentition good        Neck:  JVP normal        Respiratory:    diminished breath sounds bilaterally       Cardiac: regular rhythm;normal S1 and S2;no murmurs, gallops or rubs detected                not assessed this visit                                        GI:  abdomen soft        Extremities and Muscular Skeletal:    kyphosis bilateral LE edema;trace          Neurological:  no gross motor deficits;affect appropriate        Psych:  Alert and Oriented x 3;affect appropriate, oriented to time, person and place        CC  Neisha Esparza MD  [address] [facility] [address]  [place] [address]                Thank you for allowing me to participate in the care of your patient.      Sincerely,     Angelica Grady, NP, APRN CNP     [facility]    cc:   Neisha Esparza MD  [address]  [place] [address]

## 2019-05-22 NOTE — LETTER
5/22/2019        RE: Helene Gibbs  [address] [address]        [facility]  [place] Medical Record Number:  [id number]  Place of Service where encounter took place:  [facility] ([facility]) [[id number]]  Chief Complaint   Patient presents with     RECHECK       HPI:    Helene Gibbs  is a 84 year old (1935), who is being seen today for an episodic care visit.  HPI information obtained from: facility chart records, facility staff, patient report and [facility] chart review. Today's concern is:  CHF: today on exam patient is on O2, she states she feels a little more SOB today, admit weight was 163.1 and today weight is 172.1lbs,   HTN: /71, 123/70, 130/76 with HR in 60-70 range denies CP, palpitations  Anemia: see labs  Deconditioning: patient walking in therapy up to 160 feet using a 4ww indep, indep with ADLs  CDK: see labs  Chronic low back pain, no concerns of increased pain noted    Past Medical and Surgical History reviewed in Epic today.    MEDICATIONS:  Current Outpatient Medications   Medication Sig Dispense Refill     ACCU-CHE[employer] SMARTVIEW test strip USE 1 STRIP BY IN VITRO ROUTE 2 TIMES DAILY OR AS DIRECTED 200 strip 0     ACE/ARB/ARNI NOT PRESCRIBED (INTENTIONAL) Please choose reason not prescribed, below       albuterol (PROAIR HFA, PROVENTIL HFA, VENTOLIN HFA) 108 (90 BASE) MCG/ACT inhaler Inhale 2 puffs into the lungs every 4 hours as needed for shortness of breath / dyspnea or wheezing 1 Inhaler 5     apixaban ANTICOAGULANT (ELIQUIS) 2.5 MG tablet Take 1 tablet (2.5 mg) by mouth 2 times daily 60 tablet 1     ASPIRIN NOT PRESCRIBED (INTENTIONAL) Please choose reason not prescribed, below       betamethasone valerate (VALISONE) 0.1 % external cream APPLY TOPICALLY 2 TIMES DAILY USE SPARINGLY (Patient taking differently: APPLY TOPICALLY TO RASH UNDER BREAST DAILY USE SPARINGLY) 15 g 1     bisacodyl (DULCOLAX) 5 MG EC tablet Take 10 mg by mouth At  Bedtime       blood glucose monitoring (NO BRAND SPECIFIED) meter device kit Accucheck Mariza meter; verified with pharmacist 1 kit 0     denosumab (PROLIA) 60 MG/ML SOLN injection Inject 1 mL (60 mg) Subcutaneous every 6 months 1 mL 1     fluticasone (FLONASE) 50 MCG/ACT nasal spray Spray 2 sprays into both nostrils daily (Patient taking differently: Spray 2 sprays into both nostrils every evening ) 15.8 mL 11     glipiZIDE (GLUCOTROL) 5 MG tablet Take 1 tablet (5 mg) by mouth every morning (before breakfast) 90 tablet 1     hydrOXYzine (VISTARIL) 25 MG capsule Take 1 capsule (25 mg) by mouth 2 times daily as needed for itching 180 capsule 1     hypromellose (ARTIFICIAL TEARS) 0.5 % SOLN ophthalmic solution Place 1 drop into both eyes every hour as needed for dry eyes       lidocaine (LIDODERM) 5 % patch Place 1 patch onto the skin daily as needed back       metoprolol tartrate (LOPRESSOR) 25 MG tablet TAKE 1 TABLET BY MOUTH TWICE A DAY 90 tablet 1     pregabalin (LYRICA) 75 MG capsule Take 1 capsule (75 mg) by mouth 2 times daily 10 capsule 1     PROCTOSOL HC 2.5 % cream PLACE RECTALLY 2 TIMES DAILY AS NEEDED FOR HEMORRHOIDS 28.35 g 1     ranitidine (ZANTAC) 150 MG tablet Take 1 tablet (150 mg) by mouth At Bedtime (Patient taking differently: Take 150 mg by mouth daily ) 90 tablet 3     simvastatin (ZOCOR) 10 MG tablet Take 1 tablet (10 mg) by mouth At Bedtime 90 tablet 3     tiotropium (SPIRIVA) 18 MCG inhaled capsule Inhale 1 capsule (18 mcg) into the lungs daily 90 capsule 1     torsemide (DEMADEX) 20 MG tablet TAKE 1 TABLET BY MOUTH EVERY DAY 90 tablet 1     traMADol (ULTRAM) 50 MG tablet Take 1 tablet (50 mg) by mouth every 6 hours as needed for severe pain 12 tablet      spironolactone (ALDACTONE) 25 MG tablet Take 25 mg by mouth daily         REVIEW OF SYSTEMS:  10 point ROS of systems including Constitutional, Eyes, Respiratory, Cardiovascular, Gastroenterology, Genitourinary, Integumentary, Musculoskeletal,  Psychiatric were all negative except for pertinent positives noted in my HPI.    Objective:  /71   Pulse 70   Temp 97.5  F (36.4  C)   Resp 16   Wt 78 kg (172 lb)   LMP  (LMP Unknown)   SpO2 92%   BMI 28.62 kg/m     Exam:  GENERAL APPEARANCE:  Alert, in no distress, morbidly obese  ENT:  Mouth and posterior oropharynx normal, moist mucous membranes, [place]  EYES:  EOM, conjunctivae, lids, pupils and irises normal, PERRL  RESP:  respiratory effort and palpation of chest normal, lungs clear to auscultation , no respiratory distress, diminished breath sounds bases bilaterally with fine crackles bases bilaterally  CV:  Palpation and auscultation of heart done , regular rate and rhythm, no murmur, rub, or gallop, peripheral edema trace+ in LE bilaterally  ABDOMEN:  normal bowel sounds, soft, nontender, no hepatosplenomegaly or other masses  M/S:   Examination of:   right upper extremity, left upper extremity, right lower extremity and left lower extremity  Inspection, ROM, stability and muscle strength normal and generalized weakness noted  SKIN:  Inspection of skin and subcutaneous tissue baseline  NEURO:   Cranial nerves 2-12 are normal tested and grossly at patient's baseline, speech WNL  PSYCH:  affect and mood normal    Labs:   Recent labs in [facility] reviewed by me today.     ASSESSMENT/PLAN:  Acute on chronic diastolic CHF (congestive heart failure) (H)  Essential hypertension  Chronic atrial fib, S/P ablation -- on Eliquis  Physical deconditioning  Acute/ongoing: daily weights, vitals daily and prn, BMP twice weekly,  torsemide 20mg QD, ,aldactone 25mg QD, metoprolol 25mg BID, apixaban 2.5mg BID,   F/u with cardiology [facility] on 5/22/19     CKD (chronic kidney disease) stage 4, GFR 15-29 ml/min (H)  Ongoing: baseline creat 1.3-2.2  BMP twice weekly     HCAP (healthcare-associated pneumonia)  COPD  Acute/ongoing: monitor SAO2 at rest and with activity, wean off O2 during the day, finished levaquin and mucinex,  continue albuterol MDI 2 puffs q 4 hours prn, spiriva 18mcg QD     Iron deficiency anemia due to chronic blood loss  Ongoing: Hgb today 10.9, Hgb twice weekly  Hx of GI bleed, patient on eliquis     Type 2 diabetes mellitus with diabetic nephropathy, without long-term current use of insulin (H)  Ongoing: BMP twice weekly, glipizide 5mg QAM     Chronic low back pain with sciatica, sciatica laterality unspecified, unspecified back pain laterality  Ongoing: PT and OT for strengthening, continue lyrica 75mg BID and tramadol 50mg q 6 hours prn    Orders written by provider at facility  No new orders today, patient has appt with [facility] today will defer adjustment to diuretic to [facility] clinic due to weight gain      Electronically signed by:  Tonya Lynn Haase, APRN CNP               Sincerely,        Tonya Lynn Haase, APRN CNP

## 2019-05-22 NOTE — PROGRESS NOTES
Service Date: 05/22/2019      HISTORY OF PRESENT ILLNESS:  Ms. Gibbs is a delightful 84-year-old woman well known to me here at the [facility].  She is an established patient of Dr. Juarez and has also been seen by Dr. Gomez in the past for consultation of Watchman.      PAST MEDICAL HISTORY:   1.  Permanent atrial fibrillation with AV alexandra ablation and pacemaker implantation in 2012, on apixaban 2.5 mg b.i.d.   2.  Chronic diastolic heart failure (HFpEF) with moderate RV dysfunction.   3.  Non-STEMI 05/2008 with troponin bump of 0.7.  Nuclear stress test has showed mild apical ischemia being treated medically.   4.  Hypertension.   5.  Mild pulmonary hypertension.   6.  History of breast cancer.   7.  History of severe anemia with colonoscopy reviewing angioectasias, recent hemoglobin is 9.82.   8.  Mitral regurgitation, moderate to severe.   9.  Chronic kidney disease, baseline creatinine 1.4 to 1.6.  Unfortunately, creatinine today is 2.34 with a GFR of 20.   10.  Recent pneumonia 05/07.  The patient recently completed Levaquin dose several days ago.      Briefly, Ms. Gibbs is a delightful 84-year-old woman well known to me.  She is here today for post-hospital followup.  As you may recall, she was scheduled to undergo Watchman several weeks ago.  On 05/04, she presented to the Emergency Department with generalized weakness and increased shortness of breath.  She was diagnosed with pneumonia, started on antibiotic therapy.  She was seen in consultation by Cardiology for her moderate severe TR and RV dilatation/dysfunction.  She also has severe pulmonary hypertension.  She was monitored and diuresed over the weekend.  When she was to have her Watchman, 4 days later, they decided to cancel and do it at a later time when she was stable.      She has been residing and [facility].   do note her weight being up 8 pounds.  The patient states that she has been eating a higher caloric foods as well as daily  desserts.  She was surprised to see her weight was up.  She denies any shortness of breath.  Her weight today is 173 pounds, where her baseline weight has fluctuated from 158-165 pounds.  She does have increased peripheral edema, but denies shortness of breath.      BMP was completed today, which showed slight bump of her potassium of 5.4, BUN 47, creatinine 2.34, GFR of 20.  Serum sodium is 133.  The patient states that she has been drinking V8 in the morning and has been eating saltier foods.  She has not been necessarily limiting her nutritional or fluid intake.  She states that she was trying to just recover from the pneumonia.  As mentioned above, hemoglobin is also low at 9.2.      Echocardiogram 02/2019 showed moderate severe tricuspid regurgitation, severe pulmonary hypertension.  EF of 50%-55% with severe biatrial enlargement.  RV function was moderately reduced.      Transesophageal echo completed 05/06, again showed moderate severe regurgitation.  Left atrium was severely dilated.  No thrombus was detected in the left atrial appendage.  All other review of systems and past medical history are noted below.      ASSESSMENT AND PLAN:  Ms. Gibbs is a delightful 84-year-old woman here today for followup.   1.  HFpEF with moderate RV dysfunction.  Admitted 05/03/2019 due to generalized weakness and shortness of breath.  She was aggressively diuresed with IV Lasix.  Her creatinine remained stable.  She is initially on torsemide at 40 mg daily, decreased back to her PTA dose of 20 mg daily.  Sometime after her admission, her spironolactone was discontinued.  Weight is up at 173 pounds, roughly 8 pounds from her baseline weight.  This is mostly secondary to her current diet.  She was reluctant to increase her diuretic.  She is asymptomatic except for some peripheral edema.  She will go back on a low sodium diet as well as monitoring her fluid intake.   2.  Chronic kidney disease with recent creatinine of 2.34  with a GFR of 20.  The patient just completed her Levaquin dose.  A repeat BMP will be completed on Friday.  [facility] I would like to make sure her creatinine and potassium are stable.  She will remain on torsemide at 20 mg daily.   3.  Permanent atrial fibrillation.  She is status post AV alexandra ablation with pacemaker implantation.  CHADS-VASc score is 5 (gender, age, hypertension, cardiomyopathy).  HAS-BLED score is 2 (age and bleeding).  The plan is to proceed with her Watchman.  This will most likely happen in July.  Eliquis will be held 2 days prior to the procedure.  She had a JOAN completed in May, which gives us the appendage.  measurements as well as no evidence of thrombus.  I do not believe a repeat JOAN will be needed.   4.  Anemia.  Hemoglobin is low at 9.2.  I have encouraged her to follow up with Dr. Esparza.  The patient has been very tired.  She has no evidence of active bleeding.   5.  Moderate to severe tricuspid regurgitation and severe pulmonary hypertension.  Dr. Roman felt patient is not a candidate for invasive therapy for her moderate severe TR.  We will continue to treat her medically.      She is eager to proceed with her Watchman.  She will be discharged on Friday.  Should there be any questions or concerns, I have encouraged her to contact us.  We will reassess a BMP on Friday.  I will see her back in 1 month with another BMP at that time.      Thank you for including us in her care.         VANESA SALCIDO NP             D: 2019   T: 2019   MT: BLANQUITA      Name:     PATRICIA BOBO   MRN:      [id number]        Account:      [id number]   :      1935           Service Date: 2019      Document: [id number]

## 2019-05-22 NOTE — LETTER
5/22/2019      Neisha Esparza MD  [address]  [facility] [address]      RE: Helene Gibbs       Dear Colleague,    I had the pleasure of seeing Helene Gibbs in the [facility].    Service Date: 05/22/2019      HISTORY OF PRESENT ILLNESS:  Ms. Gibbs is a delightful 84-year-old woman well known to me here at the Heart Clinic.  She is an established patient of Dr. Juarez and has also been seen by Dr. Gomez in the past for consultation of Watchman.      PAST MEDICAL HISTORY:   1.  Permanent atrial fibrillation with AV alexandra ablation and pacemaker implantation in 2012, on apixaban 2.5 mg b.i.d.   2.  Chronic diastolic heart failure (HFpEF) with moderate RV dysfunction.   3.  Non-STEMI 05/2008 with troponin bump of 0.7.  Nuclear stress test has showed mild apical ischemia being treated medically.   4.  Hypertension.   5.  Mild pulmonary hypertension.   6.  History of breast cancer.   7.  History of severe anemia with colonoscopy reviewing angioectasias, recent hemoglobin is 9.82.   8.  Mitral regurgitation, moderate to severe.   9.  Chronic kidney disease, baseline creatinine 1.4 to 1.6.  Unfortunately, creatinine today is 2.34 with a GFR of 20.   10.  Recent pneumonia 05/07.  The patient recently completed Levaquin dose several days ago.      Briefly, Ms. Gibbs is a delightful 84-year-old woman well known to me.  She is here today for post-hospital followup.  As you may recall, she was scheduled to undergo Watchman several weeks ago.  On 05/04, she presented to the Emergency Department with generalized weakness and increased shortness of breath.  She was diagnosed with pneumonia, started on antibiotic therapy.  She was seen in consultation by Cardiology for her moderate severe TR and RV dilatation/dysfunction.  She also has severe pulmonary hypertension.  She was monitored and diuresed over the weekend.  When she was to have her Watchman, 4 days later,  they decided to cancel and do it at a later time when she was stable.      She has been residing and [facility].   do note her weight being up 8 pounds.  The patient states that she has been eating a higher caloric foods as well as daily desserts.  She was surprised to see her weight was up.  She denies any shortness of breath.  Her weight today is 173 pounds, where her baseline weight has fluctuated from 158-165 pounds.  She does have increased peripheral edema, but denies shortness of breath.      BMP was completed today, which showed slight bump of her potassium of 5.4, BUN 47, creatinine 2.34, GFR of 20.  Serum sodium is 133.  The patient states that she has been drinking V8 in the morning and has been eating saltier foods.  She has not been necessarily limiting her nutritional or fluid intake.  She states that she was trying to just recover from the pneumonia.  As mentioned above, hemoglobin is also low at 9.2.      Echocardiogram 02/2019 showed moderate severe tricuspid regurgitation, severe pulmonary hypertension.  EF of 50%-55% with severe biatrial enlargement.  RV function was moderately reduced.      Transesophageal echo completed 05/06, again showed moderate severe regurgitation.  Left atrium was severely dilated.  No thrombus was detected in the left atrial appendage.  All other review of systems and past medical history are noted below.      ASSESSMENT AND PLAN:  Ms. Gibbs is a delightful 84-year-old woman here today for followup.   1.  HFpEF with moderate RV dysfunction.  Admitted 05/03/2019 due to generalized weakness and shortness of breath.  She was aggressively diuresed with IV Lasix.  Her creatinine remained stable.  She is initially on torsemide at 40 mg daily, decreased back to her PTA dose of 20 mg daily.  Sometime after her admission, her spironolactone was discontinued.  Weight is up at 173 pounds, roughly 8 pounds from her baseline weight.  This is mostly secondary to her current diet.   She was reluctant to increase her diuretic.  She is asymptomatic except for some peripheral edema.  She will go back on a low sodium diet as well as monitoring her fluid intake.   2.  Chronic kidney disease with recent creatinine of 2.34 with a GFR of 20.  The patient just completed her Levaquin dose.  A repeat BMP will be completed on Friday.  Prior to her discharging from [facility], I would like to make sure her creatinine and potassium are stable.  She will remain on torsemide at 20 mg daily.   3.  Permanent atrial fibrillation.  She is status post AV alexandra ablation with pacemaker implantation.  CHADS-VASc score is 5 (gender, age, hypertension, cardiomyopathy).  HAS-BLED score is 2 (age and bleeding).  The plan is to proceed with her Watchman.  This will most likely happen in July.  Eliquis will be held 2 days prior to the procedure.  She had a JOAN completed in May, which gives us the appendage.  measurements as well as no evidence of thrombus.  I do not believe a repeat JOAN will be needed.   4.  Anemia.  Hemoglobin is low at 9.2.  I have encouraged her to follow up with Dr. Esparza.  The patient has been very tired.  She has no evidence of active bleeding.   5.  Moderate to severe tricuspid regurgitation and severe pulmonary hypertension.  Dr. Roman felt patient is not a candidate for invasive therapy for her moderate severe TR.  We will continue to treat her medically.      She is eager to proceed with her Watchman.  She will be discharged on Friday.  Should there be any questions or concerns, I have encouraged her to contact us.  We will reassess a BMP on Friday.  I will see her back in 1 month with another BMP at that time.      Thank you for including us in her care.         VANESA SALCIDO NP             D: 2019   T: 2019   MT: BLANQUITA      Name:     PATRICIA BOBO   MRN:      [id number]        Account:      [id number]   :      1935           Service Date: 2019      Document: [id number]          Outpatient Encounter Medications as of 5/22/2019   Medication Sig Dispense Refill     albuterol (PROAIR HFA, PROVENTIL HFA, VENTOLIN HFA) 108 (90 BASE) MCG/ACT inhaler Inhale 2 puffs into the lungs every 4 hours as needed for shortness of breath / dyspnea or wheezing 1 Inhaler 5     apixaban ANTICOAGULANT (ELIQUIS) 2.5 MG tablet Take 1 tablet (2.5 mg) by mouth 2 times daily 60 tablet 1     betamethasone valerate (VALISONE) 0.1 % external cream APPLY TOPICALLY 2 TIMES DAILY USE SPARINGLY (Patient taking differently: APPLY TOPICALLY TO RASH UNDER BREAST DAILY USE SPARINGLY) 15 g 1     bisacodyl (DULCOLAX) 5 MG EC tablet Take 10 mg by mouth At Bedtime       blood glucose monitoring (NO BRAND SPECIFIED) meter device kit Accucheck Mariza meter; verified with pharmacist 1 kit 0     fluticasone (FLONASE) 50 MCG/ACT nasal spray Spray 2 sprays into both nostrils daily (Patient taking differently: Spray 2 sprays into both nostrils every evening ) 15.8 mL 11     glipiZIDE (GLUCOTROL) 5 MG tablet Take 1 tablet (5 mg) by mouth every morning (before breakfast) 90 tablet 1     hydrOXYzine (VISTARIL) 25 MG capsule Take 1 capsule (25 mg) by mouth 2 times daily as needed for itching 180 capsule 1     lidocaine (LIDODERM) 5 % patch Place 1 patch onto the skin daily as needed back       metoprolol tartrate (LOPRESSOR) 25 MG tablet TAKE 1 TABLET BY MOUTH TWICE A DAY 90 tablet 1     pregabalin (LYRICA) 75 MG capsule Take 1 capsule (75 mg) by mouth 2 times daily 10 capsule 1     PROCTOSOL HC 2.5 % cream PLACE RECTALLY 2 TIMES DAILY AS NEEDED FOR HEMORRHOIDS 28.35 g 1     ranitidine (ZANTAC) 150 MG tablet Take 1 tablet (150 mg) by mouth At Bedtime (Patient taking differently: Take 150 mg by mouth daily ) 90 tablet 3     simvastatin (ZOCOR) 10 MG tablet Take 1 tablet (10 mg) by mouth At Bedtime 90 tablet 3     tiotropium (SPIRIVA) 18 MCG inhaled capsule Inhale 1 capsule (18 mcg) into the lungs daily 90 capsule 1     traMADol (ULTRAM)  50 MG tablet Take 1 tablet (50 mg) by mouth every 6 hours as needed for severe pain 12 tablet      [DISCONTINUED] torsemide (DEMADEX) 20 MG tablet TAKE 1 TABLET BY MOUTH EVERY DAY 90 tablet 1     ACCU-CHEK SMARTVIEW test strip USE 1 STRIP BY IN VITRO ROUTE 2 TIMES DAILY OR AS DIRECTED 200 strip 0     ACE/ARB/ARNI NOT PRESCRIBED (INTENTIONAL) Please choose reason not prescribed, below       ASPIRIN NOT PRESCRIBED (INTENTIONAL) Please choose reason not prescribed, below       denosumab (PROLIA) 60 MG/ML SOLN injection Inject 1 mL (60 mg) Subcutaneous every 6 months 1 mL 1     hypromellose (ARTIFICIAL TEARS) 0.5 % SOLN ophthalmic solution Place 1 drop into both eyes every hour as needed for dry eyes       [DISCONTINUED] spironolactone (ALDACTONE) 25 MG tablet Take 25 mg by mouth daily       No facility-administered encounter medications on file as of 5/22/2019.      Again, thank you for allowing me to participate in the care of your patient.      Sincerely,    Angelica Grady NP, APRN CNP     [facility]

## 2019-05-22 NOTE — PROGRESS NOTES
[facility]  [facility] Record Number:  [id number]  Place of Service where encounter took place:  [facility] ([facility] [[id number]]  Chief Complaint   Patient presents with     RECHECK       HPI:    Helene Gibbs  is a 84 year old (1935), who is being seen today for an episodic care visit.  HPI information obtained from: facility chart records, facility staff, patient report and [facility] chart review. Today's concern is:  CHF: today on exam patient is on O2, she states she feels a little more SOB today, admit weight was 163.1 and today weight is 172.1lbs,   HTN: /71, 123/70, 130/76 with HR in 60-70 range denies CP, palpitations  Anemia: see labs  Deconditioning: patient walking in therapy up to 160 feet using a 4ww indep, indep with ADLs  CDK: see labs  Chronic low back pain, no concerns of increased pain noted    Past Medical and Surgical History reviewed in Epic today.    MEDICATIONS:  Current Outpatient Medications   Medication Sig Dispense Refill     ACCU-CHEK SMARTVIEW test strip USE 1 STRIP BY IN VITRO ROUTE 2 TIMES DAILY OR AS DIRECTED 200 strip 0     ACE/ARB/ARNI NOT PRESCRIBED (INTENTIONAL) Please choose reason not prescribed, below       albuterol (PROAIR HFA, PROVENTIL HFA, VENTOLIN HFA) 108 (90 BASE) MCG/ACT inhaler Inhale 2 puffs into the lungs every 4 hours as needed for shortness of breath / dyspnea or wheezing 1 Inhaler 5     apixaban ANTICOAGULANT (ELIQUIS) 2.5 MG tablet Take 1 tablet (2.5 mg) by mouth 2 times daily 60 tablet 1     ASPIRIN NOT PRESCRIBED (INTENTIONAL) Please choose reason not prescribed, below       betamethasone valerate (VALISONE) 0.1 % external cream APPLY TOPICALLY 2 TIMES DAILY USE SPARINGLY (Patient taking differently: APPLY TOPICALLY TO RASH UNDER BREAST DAILY USE SPARINGLY) 15 g 1     bisacodyl (DULCOLAX) 5 MG EC tablet Take 10 mg by mouth At Bedtime       blood glucose monitoring (NO BRAND SPECIFIED) meter device kit Accucheck  Mariza meter; verified with pharmacist 1 kit 0     denosumab (PROLIA) 60 MG/ML SOLN injection Inject 1 mL (60 mg) Subcutaneous every 6 months 1 mL 1     fluticasone (FLONASE) 50 MCG/ACT nasal spray Spray 2 sprays into both nostrils daily (Patient taking differently: Spray 2 sprays into both nostrils every evening ) 15.8 mL 11     glipiZIDE (GLUCOTROL) 5 MG tablet Take 1 tablet (5 mg) by mouth every morning (before breakfast) 90 tablet 1     hydrOXYzine (VISTARIL) 25 MG capsule Take 1 capsule (25 mg) by mouth 2 times daily as needed for itching 180 capsule 1     hypromellose (ARTIFICIAL TEARS) 0.5 % SOLN ophthalmic solution Place 1 drop into both eyes every hour as needed for dry eyes       lidocaine (LIDODERM) 5 % patch Place 1 patch onto the skin daily as needed back       metoprolol tartrate (LOPRESSOR) 25 MG tablet TAKE 1 TABLET BY MOUTH TWICE A DAY 90 tablet 1     pregabalin (LYRICA) 75 MG capsule Take 1 capsule (75 mg) by mouth 2 times daily 10 capsule 1     PROCTOSOL HC 2.5 % cream PLACE RECTALLY 2 TIMES DAILY AS NEEDED FOR HEMORRHOIDS 28.35 g 1     ranitidine (ZANTAC) 150 MG tablet Take 1 tablet (150 mg) by mouth At Bedtime (Patient taking differently: Take 150 mg by mouth daily ) 90 tablet 3     simvastatin (ZOCOR) 10 MG tablet Take 1 tablet (10 mg) by mouth At Bedtime 90 tablet 3     tiotropium (SPIRIVA) 18 MCG inhaled capsule Inhale 1 capsule (18 mcg) into the lungs daily 90 capsule 1     torsemide (DEMADEX) 20 MG tablet TAKE 1 TABLET BY MOUTH EVERY DAY 90 tablet 1     traMADol (ULTRAM) 50 MG tablet Take 1 tablet (50 mg) by mouth every 6 hours as needed for severe pain 12 tablet      spironolactone (ALDACTONE) 25 MG tablet Take 25 mg by mouth daily         REVIEW OF SYSTEMS:  10 point ROS of systems including Constitutional, Eyes, Respiratory, Cardiovascular, Gastroenterology, Genitourinary, Integumentary, Musculoskeletal, Psychiatric were all negative except for pertinent positives noted in my  HPI.    Objective:  /71   Pulse 70   Temp 97.5  F (36.4  C)   Resp 16   Wt 78 kg (172 lb)   LMP  (LMP Unknown)   SpO2 92%   BMI 28.62 kg/m    Exam:  GENERAL APPEARANCE:  Alert, in no distress, morbidly obese  ENT:  Mouth and posterior oropharynx normal, moist mucous membranes, [place]  EYES:  EOM, conjunctivae, lids, pupils and irises normal, PERRL  RESP:  respiratory effort and palpation of chest normal, lungs clear to auscultation , no respiratory distress, diminished breath sounds bases bilaterally with fine crackles bases bilaterally  CV:  Palpation and auscultation of heart done , regular rate and rhythm, no murmur, rub, or gallop, peripheral edema trace+ in LE bilaterally  ABDOMEN:  normal bowel sounds, soft, nontender, no hepatosplenomegaly or other masses  M/S:   Examination of:   right upper extremity, left upper extremity, right lower extremity and left lower extremity  Inspection, ROM, stability and muscle strength normal and generalized weakness noted  SKIN:  Inspection of skin and subcutaneous tissue baseline  NEURO:   Cranial nerves 2-12 are normal tested and grossly at patient's baseline, speech WNL  PSYCH:  affect and mood normal    Labs:   Recent labs in ePantry reviewed by me today.     ASSESSMENT/PLAN:  Acute on chronic diastolic CHF (congestive heart failure) (H)  Essential hypertension  Chronic atrial fib, S/P ablation -- on Eliquis  Physical deconditioning  Acute/ongoing: daily weights, vitals daily and prn, BMP twice weekly,  torsemide 20mg QD, ,aldactone 25mg QD, metoprolol 25mg BID, apixaban 2.5mg BID,   F/u with cardiology [facility] on 5/22/19     CKD (chronic kidney disease) stage 4, GFR 15-29 ml/min (H)  Ongoing: baseline creat 1.3-2.2  BMP twice weekly     HCAP (healthcare-associated pneumonia)  COPD  Acute/ongoing: monitor SAO2 at rest and with activity, wean off O2 during the day, finished levaquin and mucinex, continue albuterol MDI 2 puffs q 4 hours prn, spiriva 18mcg QD     Iron  deficiency anemia due to chronic blood loss  Ongoing: Hgb today 10.9, Hgb twice weekly  Hx of GI bleed, patient on eliquis     Type 2 diabetes mellitus with diabetic nephropathy, without long-term current use of insulin (H)  Ongoing: BMP twice weekly, glipizide 5mg QAM     Chronic low back pain with sciatica, sciatica laterality unspecified, unspecified back pain laterality  Ongoing: PT and OT for strengthening, continue lyrica 75mg BID and tramadol 50mg q 6 hours prn    Orders written by provider at facility  No new orders today, patient has appt with [facility] today will defer adjustment to diuretic to [facility] due to weight gain      Electronically signed by:  Tonya Lynn Haase, APRN CNP

## 2019-05-23 NOTE — PROGRESS NOTES
Reviewed w/Angelica Grady NP:   1.  Please have facility do CXR(patient recently treated for pneumonia)   2.  Increase torsemide to 20mg bid X3 days   3.  BMP on 5/24    Called [facility], spoke w/navdeep nurse and gave above instructions. He added that patient tentatively planned for discharge tomorrow. I advised that this wait until sx resolve. He added that their facility NP had stated that as well. Reminder note sent to board to watch for BMP and CXR results tomorrow and f/u on Tuesday 5/28 to assess response.  Renita Chatterjee, RN on 5/23/2019 at 3:08 PM

## 2019-05-23 NOTE — PROGRESS NOTES
Incoming call from patients nurse at [facility] w/concerns about weight gain and SOB. Patient admitted to [facility] on 5/10. Her wieght on admit = 163.1#, her weight yesterday(5/22)= 172.1#. Nurse stating weight increase has been gradual.     Nurse also stated that patient continues to have intermittent SOB. She was admitted requiring supplemental 02 @ 1-2L. This was weaned to RA, however she has subsequently had 02 drops to 89-92% w/subjective c/o SOB. The 02 will be turned on to 0.5/m w/rapid resolution of 02 sat and sx. Nurse denied that patient has any SOB at rest - sx occur w/activity.     Staff is also noticing that patient seems increasingly fatigued. She is now more likely to fall asleep in her chair. They have not observed any confusion/disorientation. She is also afebrile.     Patient seen by Angelica Grady NP yesterday(5/22), pe her clinic notes:  She has been residing and [facility].   do note her weight being up 8 pounds.  The patient states that she has been eating a higher caloric foods as well as daily desserts.  She was surprised to see her weight was up.  She denies any shortness of breath.  Her weight today is 173 pounds, where her baseline weight has fluctuated from 158-165 pounds.  She does have increased peripheral edema, but denies shortness of breath.    Will review w/AK.  Rneita Chatterjee RN on 5/23/2019 at 3:03 PM

## 2019-05-24 NOTE — PROGRESS NOTES
Called [facility] for update. Nurse confirmed CXR done yesterday. Results read over the phone, LLL consolidation = pna vs atelectasis. Facility provider has ordered incentive spirometry. CXR and BMP results to be faxed to us.     Nurse stated patient will remain in TCU until Tues 5/28. Her weight today = 170.5#. Will watch for faxed results. Will f/u on Tues - reminder note sent to board.  Renita Chatterjee RN on 5/24/2019 at 1:44 PM

## 2019-05-24 NOTE — PROGRESS NOTES
"[place] [facility]  [place] Medical Record Number:  [id number]  Place of Service where encounter took place:  [facility] ([facility]) [[id number]]  Chief Complaint   Patient presents with     RECHECK       HPI:    Helene Gibbs  is a 84 year old (1935), who is being seen today for an episodic care visit.  HPI information obtained from: facility chart records, facility staff, patient report and [facility] chart review. Today's concern is:  CHF: today on exam patient is on O2, states breathing is stable and \"I feel fine\" patient really wants to go home today but has agreed to stay over the weekend for further monitoring, admit weight was 163.1 and today weight is 170lbs, weight starting to trend down, yesterday weight 172.1lbs  HTN: /68, 108/67, 117/71 with HR in 60-70 range denies CP, palpitations  Anemia: see labs  Deconditioning: patient walking in therapy up to 160 feet using a 4ww indep, indep with ADLs  CDK: see labs  Chronic low back pain, no concerns of increased pain noted  DMII: blood sugars /192, noon 193-253 and pM 152-332    Past Medical and Surgical History reviewed in Epic today.    MEDICATIONS:  Current Outpatient Medications   Medication Sig Dispense Refill     ACCU-CHEK SMARTVIEW test strip USE 1 STRIP BY IN VITRO ROUTE 2 TIMES DAILY OR AS DIRECTED 200 strip 0     ACE/ARB/ARNI NOT PRESCRIBED (INTENTIONAL) Please choose reason not prescribed, below       albuterol (PROAIR HFA, PROVENTIL HFA, VENTOLIN HFA) 108 (90 BASE) MCG/ACT inhaler Inhale 2 puffs into the lungs every 4 hours as needed for shortness of breath / dyspnea or wheezing 1 Inhaler 5     apixaban ANTICOAGULANT (ELIQUIS) 2.5 MG tablet Take 1 tablet (2.5 mg) by mouth 2 times daily 60 tablet 1     ASPIRIN NOT PRESCRIBED (INTENTIONAL) Please choose reason not prescribed, below       betamethasone valerate (VALISONE) 0.1 % external cream APPLY TOPICALLY 2 TIMES DAILY USE SPARINGLY (Patient taking differently: " APPLY TOPICALLY TO RASH UNDER BREAST DAILY USE SPARINGLY) 15 g 1     bisacodyl (DULCOLAX) 5 MG EC tablet Take 10 mg by mouth At Bedtime       blood glucose monitoring (NO BRAND SPECIFIED) meter device kit Accucheck Mariza meter; verified with pharmacist 1 kit 0     fluticasone (FLONASE) 50 MCG/ACT nasal spray Spray 2 sprays into both nostrils daily (Patient taking differently: Spray 2 sprays into both nostrils every evening ) 15.8 mL 11     glipiZIDE (GLUCOTROL) 5 MG tablet Take 1 tablet (5 mg) by mouth every morning (before breakfast) 90 tablet 1     hydrOXYzine (VISTARIL) 25 MG capsule Take 1 capsule (25 mg) by mouth 2 times daily as needed for itching 180 capsule 1     hypromellose (ARTIFICIAL TEARS) 0.5 % SOLN ophthalmic solution Place 1 drop into both eyes every hour as needed for dry eyes       lidocaine (LIDODERM) 5 % patch Place 1 patch onto the skin daily as needed back       metoprolol tartrate (LOPRESSOR) 25 MG tablet TAKE 1 TABLET BY MOUTH TWICE A DAY 90 tablet 1     pregabalin (LYRICA) 75 MG capsule Take 1 capsule (75 mg) by mouth 2 times daily 10 capsule 1     PROCTOSOL HC 2.5 % cream PLACE RECTALLY 2 TIMES DAILY AS NEEDED FOR HEMORRHOIDS 28.35 g 1     ranitidine (ZANTAC) 150 MG tablet Take 1 tablet (150 mg) by mouth At Bedtime (Patient taking differently: Take 150 mg by mouth daily ) 90 tablet 3     simvastatin (ZOCOR) 10 MG tablet Take 1 tablet (10 mg) by mouth At Bedtime 90 tablet 3     tiotropium (SPIRIVA) 18 MCG inhaled capsule Inhale 1 capsule (18 mcg) into the lungs daily 90 capsule 1     torsemide (DEMADEX) 20 MG tablet Take 20 mg by mouth 3 times daily       traMADol (ULTRAM) 50 MG tablet Take 1 tablet (50 mg) by mouth every 6 hours as needed for severe pain 12 tablet      denosumab (PROLIA) 60 MG/ML SOLN injection Inject 1 mL (60 mg) Subcutaneous every 6 months 1 mL 1     torsemide (DEMADEX) 20 MG tablet TAKE 1 TABLET BY MOUTH EVERY DAY (Patient not taking: Reported on 5/23/2019) 90 tablet 1        REVIEW OF SYSTEMS:  10 point ROS of systems including Constitutional, Eyes, Respiratory, Cardiovascular, Gastroenterology, Genitourinary, Integumentary, Musculoskeletal, Psychiatric were all negative except for pertinent positives noted in my HPI.    Objective:  /68   Pulse 70   Temp 97.7  F (36.5  C)   Resp 16   Wt 78.1 kg (172 lb 1.6 oz)   LMP  (LMP Unknown)   SpO2 94%   BMI 28.64 kg/m    Exam:  GENERAL APPEARANCE:  Alert, in no distress, morbidly obese  ENT:  Mouth and posterior oropharynx normal, moist mucous membranes, [place]  EYES:  EOM, conjunctivae, lids, pupils and irises normal, PERRL  RESP:  respiratory effort and palpation of chest normal, lungs clear to auscultation , no respiratory distress, diminished breath sounds bases bilaterally with fine crackles left base noted  CV:  Palpation and auscultation of heart done , regular rate and rhythm, no murmur, rub, or gallop, peripheral edema trace+ in LE bilaterally  ABDOMEN:  normal bowel sounds, soft, nontender, no hepatosplenomegaly or other masses  M/S:   Examination of:   right upper extremity, left upper extremity, right lower extremity and left lower extremity  Inspection, ROM, stability and muscle strength normal and generalized weakness noted  SKIN:  Inspection of skin and subcutaneous tissue baseline  NEURO:   Cranial nerves 2-12 are normal tested and grossly at patient's baseline, speech WNL  PSYCH:  affect and mood normal    Labs:   Recent labs in [facility] reviewed by me today.     ASSESSMENT/PLAN:  Acute on chronic diastolic CHF (congestive heart failure) (H)  Essential hypertension  Chronic atrial fib, S/P ablation -- on Eliquis  Physical deconditioning  Acute/ongoing: daily weights, vitals daily and prn, BMP on tuesday,  Increase torsemide to 20mg BID, ,aldactone 25mg QD, metoprolol 25mg BID, apixaban 2.5mg BID, Hold DC to home   F/u with cardiology [facility] as directed      CKD (chronic kidney disease) stage 4, GFR 15-29 ml/min  (H)  Ongoing: baseline creat 1.3-2.2  BMP again on tuesday, creat today 2.1     HCAP (healthcare-associated pneumonia)  COPD  Acute/ongoing: monitor SAO2 at rest and with activity, wean off O2 during the day, finished levaquin and mucinex, continue albuterol MDI 2 puffs q 4 hours prn, spiriva 18mcg QD  Encourage IS QID and prn     Iron deficiency anemia due to chronic blood loss  Ongoing: Hgb today 10.9,   Hx of GI bleed, patient on eliquis     Type 2 diabetes mellitus with diabetic nephropathy, without long-term current use of insulin (H)  Ongoing: BMP follow, glipizide 5mg QAM, blood sugar monitoring TID and prn     Chronic low back pain with sciatica, sciatica laterality unspecified, unspecified back pain laterality  Ongoing: PT and OT for strengthening, continue lyrica 75mg BID and tramadol 50mg q 6 hours prn    Orders written by provider at facility  Hold discharge,   BMP on tuesday  Torsemide 20mg BID increased by [facility] clinic  IS QID and prn      Electronically signed by:  Tonya Lynn Haase, APRN CNP

## 2019-05-24 NOTE — TELEPHONE ENCOUNTER
Paged re:CXR result which was ordered for increased weight and decreased O2 sats. I was read the report which was summarized as essentially showing no signs of fluid overload and bibasilar infiltrates vs atelectasis. Noted that the infiltrates could be old as she was recently treated for pneumonia. Also she does have h/o CHF. Was just at cardiology appointment and nurse mentions that her diuretics were adjusted yesterday. She does have a cough but denies dyspnea. She had SpO2 of 94% on RA tonight; other vitals were taken this AM but she was afebrile. Will route to primary NP Tonya Haase for clinical interpretation of what she believes to be new vs old findings and advise on treatment plan as patient is currently stable.    Azul Boyer, DO

## 2019-05-24 NOTE — PROGRESS NOTES
BMP results:  Component      Latest Ref Rng & Units 5/22/2019 5/24/2019   Sodium      136 - 145 mmol/L 133 (L) 140   Potassium      3.5 - 5.1 mmol/L 5.4 (H) 4.5   Chloride      98 - 109 mmol/L 95 (L) 99   Carbon Dioxide      20 - 29 mmol/L 33 (H) 30 (H)   Anion Gap      7 - 16 mmol/L 10.4 11   Glucose      70 - 100 mg/dL 173 (H) 92   Urea Nitrogen      7 - 26 mg/dL 47 (H) 50 (H)   Creatinine      0.55 - 1.02 mg/dL 2.34 (H) 1.91 (H)   GFR Estimate      >60 ml/min/1.73m2 20 (L) 24 (L)   GFR Estimate If Black      >60 ml/min/1.73m2 24 (L) 28 (L)   Calcium      8.4 - 10.4 mg/dL 9.4 9.5     Above results and CXR report sent to scanning.  Will forward to Angelica Grady for review.  Renita Chatterjee RN on 5/24/2019 at 2:27 PM

## 2019-05-24 NOTE — LETTER
"    5/24/2019        RE: Helene Gibbs  [address] [facility] [address]        [place] [facility]  [place] Medical Record Number:  [id number]  Place of Service where encounter took place:  [facility] ([facility]) [[id number]]  Chief Complaint   Patient presents with     RECHECK       HPI:    Helene Gibbs  is a 84 year old (1935), who is being seen today for an episodic care visit.  HPI information obtained from: facility chart records, facility staff, patient report and [facility] chart review. Today's concern is:  CHF: today on exam patient is on O2, states breathing is stable and \"I feel fine\" patient really wants to go home today but has agreed to stay over the weekend for further monitoring, admit weight was 163.1 and today weight is 170lbs, weight starting to trend down, yesterday weight 172.1lbs  HTN: /68, 108/67, 117/71 with HR in 60-70 range denies CP, palpitations  Anemia: see labs  Deconditioning: patient walking in therapy up to 160 feet using a 4ww indep, indep with ADLs  CDK: see labs  Chronic low back pain, no concerns of increased pain noted  DMII: blood sugars /192, noon 193-253 and pM 152-332    Past Medical and Surgical History reviewed in Epic today.    MEDICATIONS:  Current Outpatient Medications   Medication Sig Dispense Refill     ACCU-CHEK SMARTVIEW test strip USE 1 STRIP BY IN VITRO ROUTE 2 TIMES DAILY OR AS DIRECTED 200 strip 0     ACE/ARB/ARNI NOT PRESCRIBED (INTENTIONAL) Please choose reason not prescribed, below       albuterol (PROAIR HFA, PROVENTIL HFA, VENTOLIN HFA) 108 (90 BASE) MCG/ACT inhaler Inhale 2 puffs into the lungs every 4 hours as needed for shortness of breath / dyspnea or wheezing 1 Inhaler 5     apixaban ANTICOAGULANT (ELIQUIS) 2.5 MG tablet Take 1 tablet (2.5 mg) by mouth 2 times daily 60 tablet 1     ASPIRIN NOT PRESCRIBED (INTENTIONAL) Please choose reason not prescribed, below       betamethasone valerate (VALISONE) 0.1 % " external cream APPLY TOPICALLY 2 TIMES DAILY USE SPARINGLY (Patient taking differently: APPLY TOPICALLY TO RASH UNDER BREAST DAILY USE SPARINGLY) 15 g 1     bisacodyl (DULCOLAX) 5 MG EC tablet Take 10 mg by mouth At Bedtime       blood glucose monitoring (NO BRAND SPECIFIED) meter device kit Accucheck Mariza meter; verified with pharmacist 1 kit 0     fluticasone (FLONASE) 50 MCG/ACT nasal spray Spray 2 sprays into both nostrils daily (Patient taking differently: Spray 2 sprays into both nostrils every evening ) 15.8 mL 11     glipiZIDE (GLUCOTROL) 5 MG tablet Take 1 tablet (5 mg) by mouth every morning (before breakfast) 90 tablet 1     hydrOXYzine (VISTARIL) 25 MG capsule Take 1 capsule (25 mg) by mouth 2 times daily as needed for itching 180 capsule 1     hypromellose (ARTIFICIAL TEARS) 0.5 % SOLN ophthalmic solution Place 1 drop into both eyes every hour as needed for dry eyes       lidocaine (LIDODERM) 5 % patch Place 1 patch onto the skin daily as needed back       metoprolol tartrate (LOPRESSOR) 25 MG tablet TAKE 1 TABLET BY MOUTH TWICE A DAY 90 tablet 1     pregabalin (LYRICA) 75 MG capsule Take 1 capsule (75 mg) by mouth 2 times daily 10 capsule 1     PROCTOSOL HC 2.5 % cream PLACE RECTALLY 2 TIMES DAILY AS NEEDED FOR HEMORRHOIDS 28.35 g 1     ranitidine (ZANTAC) 150 MG tablet Take 1 tablet (150 mg) by mouth At Bedtime (Patient taking differently: Take 150 mg by mouth daily ) 90 tablet 3     simvastatin (ZOCOR) 10 MG tablet Take 1 tablet (10 mg) by mouth At Bedtime 90 tablet 3     tiotropium (SPIRIVA) 18 MCG inhaled capsule Inhale 1 capsule (18 mcg) into the lungs daily 90 capsule 1     torsemide (DEMADEX) 20 MG tablet Take 20 mg by mouth 3 times daily       traMADol (ULTRAM) 50 MG tablet Take 1 tablet (50 mg) by mouth every 6 hours as needed for severe pain 12 tablet      denosumab (PROLIA) 60 MG/ML SOLN injection Inject 1 mL (60 mg) Subcutaneous every 6 months 1 mL 1     torsemide (DEMADEX) 20 MG tablet  TAKE 1 TABLET BY MOUTH EVERY DAY (Patient not taking: Reported on 5/23/2019) 90 tablet 1       REVIEW OF SYSTEMS:  10 point ROS of systems including Constitutional, Eyes, Respiratory, Cardiovascular, Gastroenterology, Genitourinary, Integumentary, Musculoskeletal, Psychiatric were all negative except for pertinent positives noted in my HPI.    Objective:  /68   Pulse 70   Temp 97.7  F (36.5  C)   Resp 16   Wt 78.1 kg (172 lb 1.6 oz)   LMP  (LMP Unknown)   SpO2 94%   BMI 28.64 kg/m     Exam:  GENERAL APPEARANCE:  Alert, in no distress, morbidly obese  ENT:  Mouth and posterior oropharynx normal, moist mucous membranes, [place]  EYES:  EOM, conjunctivae, lids, pupils and irises normal, PERRL  RESP:  respiratory effort and palpation of chest normal, lungs clear to auscultation , no respiratory distress, diminished breath sounds bases bilaterally with fine crackles left base noted  CV:  Palpation and auscultation of heart done , regular rate and rhythm, no murmur, rub, or gallop, peripheral edema trace+ in LE bilaterally  ABDOMEN:  normal bowel sounds, soft, nontender, no hepatosplenomegaly or other masses  M/S:   Examination of:   right upper extremity, left upper extremity, right lower extremity and left lower extremity  Inspection, ROM, stability and muscle strength normal and generalized weakness noted  SKIN:  Inspection of skin and subcutaneous tissue baseline  NEURO:   Cranial nerves 2-12 are normal tested and grossly at patient's baseline, speech WNL  PSYCH:  affect and mood normal    Labs:   Recent labs in [facility] reviewed by me today.     ASSESSMENT/PLAN:  Acute on chronic diastolic CHF (congestive heart failure) (H)  Essential hypertension  Chronic atrial fib, S/P ablation -- on Eliquis  Physical deconditioning  Acute/ongoing: daily weights, vitals daily and prn, BMP on tuesday,  Increase torsemide to 20mg BID, ,aldactone 25mg QD, metoprolol 25mg BID, apixaban 2.5mg BID, Hold DC to home   F/u with  cardiology [facility] as directed      CKD (chronic kidney disease) stage 4, GFR 15-29 ml/min (H)  Ongoing: baseline creat 1.3-2.2  BMP again on tuesday, creat today 2.1     HCAP (healthcare-associated pneumonia)  COPD  Acute/ongoing: monitor SAO2 at rest and with activity, wean off O2 during the day, finished levaquin and mucinex, continue albuterol MDI 2 puffs q 4 hours prn, spiriva 18mcg QD  Encourage IS QID and prn     Iron deficiency anemia due to chronic blood loss  Ongoing: Hgb today 10.9,   Hx of GI bleed, patient on eliquis     Type 2 diabetes mellitus with diabetic nephropathy, without long-term current use of insulin (H)  Ongoing: BMP follow, glipizide 5mg QAM, blood sugar monitoring TID and prn     Chronic low back pain with sciatica, sciatica laterality unspecified, unspecified back pain laterality  Ongoing: PT and OT for strengthening, continue lyrica 75mg BID and tramadol 50mg q 6 hours prn    Orders written by provider at facility  Hold discharge,   BMP on tuesday  Torsemide 20mg BID increased by [facility]  IS QID and prn      Electronically signed by:  Tonya Lynn Haase, APRN CNP             Sincerely,        Tonya Lynn Haase, APRN CNP

## 2019-05-24 NOTE — PROGRESS NOTES
[place] [facility] DISCHARGE SUMMARY  PATIENT'S NAME: Helene Gibbs  YOB: 1935  MEDICAL RECORD NUMBER:  [id number]  Place of Service where encounter took place:  [facility] (S) [[id number]]    PRIMARY CARE PROVIDER AND CLINIC RESPONSIBLE AFTER TRANSFER:   Neisha Esparza MD, [address] / [place]                [place] [address] ***   G Provider     Transferring providers: Dr. Mario Santana MD  Recent Hospitalization/ED:  [facility] Hospital stay 5/3/19 to 5/10/19.  Date of SNF Admission: May / 10 / 2019  Date of SNF (anticipated) Discharge: {DATE/MONTH/YEAR:6021393}  Discharged to: {fgsdischargeto1:624082}  Cognitive Scores:BIMS 15/15; SBT4 /28  Physical Function: {fgsphysicalfunction:275973}  DME: {fgsdmedc:046441}    CODE STATUS/ADVANCE DIRECTIVES DISCUSSION:  DNR / DNI {Provider, verify code status is accurate as an order in Epic}  ALLERGIES: Ambien [zolpidem tartrate]; Penicillins; Definity; Sulfa drugs; Cymbalta; and Fluconazole    DISCHARGE DIAGNOSIS/NURSING FACILITY COURSE:   {fgsdia}    Past Medical History:  has a past medical history of Anemia, Ankle arthritis, Arthritis, Back pain, Bell's palsy (), Breast CA (H) (-), Breast cancer (H) (), CKD (chronic kidney disease), Colon polyps, Congestive heart failure (H), COPD (chronic obstructive pulmonary disease) (H), Coronary artery disease, DM (diabetes mellitus) (H), GERD (gastroesophageal reflux disease), Hyperlipidemia, Hypertension, Lumbar spinal stenosis, Nicotine dependence, Obesity, Osteoporosis, Other chronic pain, Pacemaker, Permanent atrial fibrillation (H) (2008), Pleural effusion, Pulmonary hypertension (H), PVD (peripheral vascular disease) (H), Rectal stenosis, Tobacco abuse, and Tricuspid regurgitation. She also has no past medical history of Difficult intubation, Malignant hyperthermia, or PONV (postoperative nausea and vomiting).    Discharge  Medications:  Current Outpatient Medications   Medication Sig Dispense Refill     ACCU-CHEK SMARTVIEW test strip USE 1 STRIP BY IN VITRO ROUTE 2 TIMES DAILY OR AS DIRECTED 200 strip 0     ACE/ARB/ARNI NOT PRESCRIBED (INTENTIONAL) Please choose reason not prescribed, below       albuterol (PROAIR HFA, PROVENTIL HFA, VENTOLIN HFA) 108 (90 BASE) MCG/ACT inhaler Inhale 2 puffs into the lungs every 4 hours as needed for shortness of breath / dyspnea or wheezing 1 Inhaler 5     apixaban ANTICOAGULANT (ELIQUIS) 2.5 MG tablet Take 1 tablet (2.5 mg) by mouth 2 times daily 60 tablet 1     ASPIRIN NOT PRESCRIBED (INTENTIONAL) Please choose reason not prescribed, below       betamethasone valerate (VALISONE) 0.1 % external cream APPLY TOPICALLY 2 TIMES DAILY USE SPARINGLY (Patient taking differently: APPLY TOPICALLY TO RASH UNDER BREAST DAILY USE SPARINGLY) 15 g 1     bisacodyl (DULCOLAX) 5 MG EC tablet Take 10 mg by mouth At Bedtime       blood glucose monitoring (NO BRAND SPECIFIED) meter device kit Accucheck Mariza meter; verified with pharmacist 1 kit 0     fluticasone (FLONASE) 50 MCG/ACT nasal spray Spray 2 sprays into both nostrils daily (Patient taking differently: Spray 2 sprays into both nostrils every evening ) 15.8 mL 11     glipiZIDE (GLUCOTROL) 5 MG tablet Take 1 tablet (5 mg) by mouth every morning (before breakfast) 90 tablet 1     hydrOXYzine (VISTARIL) 25 MG capsule Take 1 capsule (25 mg) by mouth 2 times daily as needed for itching 180 capsule 1     hypromellose (ARTIFICIAL TEARS) 0.5 % SOLN ophthalmic solution Place 1 drop into both eyes every hour as needed for dry eyes       lidocaine (LIDODERM) 5 % patch Place 1 patch onto the skin daily as needed back       metoprolol tartrate (LOPRESSOR) 25 MG tablet TAKE 1 TABLET BY MOUTH TWICE A DAY 90 tablet 1     pregabalin (LYRICA) 75 MG capsule Take 1 capsule (75 mg) by mouth 2 times daily 10 capsule 1     PROCTOSOL HC 2.5 % cream PLACE RECTALLY 2 TIMES DAILY AS  NEEDED FOR HEMORRHOIDS 28.35 g 1     ranitidine (ZANTAC) 150 MG tablet Take 1 tablet (150 mg) by mouth At Bedtime (Patient taking differently: Take 150 mg by mouth daily ) 90 tablet 3     simvastatin (ZOCOR) 10 MG tablet Take 1 tablet (10 mg) by mouth At Bedtime 90 tablet 3     tiotropium (SPIRIVA) 18 MCG inhaled capsule Inhale 1 capsule (18 mcg) into the lungs daily 90 capsule 1     [START ON 5/24/2019] torsemide (DEMADEX) 20 MG tablet Take 20 mg by mouth 3 times daily       traMADol (ULTRAM) 50 MG tablet Take 1 tablet (50 mg) by mouth every 6 hours as needed for severe pain 12 tablet      denosumab (PROLIA) 60 MG/ML SOLN injection Inject 1 mL (60 mg) Subcutaneous every 6 months 1 mL 1     torsemide (DEMADEX) 20 MG tablet TAKE 1 TABLET BY MOUTH EVERY DAY (Patient not taking: Reported on 5/23/2019) 90 tablet 1     ***  Medication Changes/Rationale:     ***    Controlled medications sent with patient:   {fgscontrolledmeds1:482555}     ROS:   {ROS [facility]GS:191553:::0}    Physical Exam:   Vitals: /68   Pulse 70   Temp 97.7  F (36.5  C)   Resp 16   Wt 78.1 kg (172 lb 1.6 oz)   LMP  (LMP Unknown)   SpO2 94%   BMI 28.64 kg/m    BMI= Body mass index is 28.64 kg/m .  {NURSING HOME PHYSICAL EXAM:504327}     SNF labs: {fgslabdischarge:839792}  {fgsinrtable:290712:x}    DISCHARGE PLAN:    Follow up labs: {fgsfuturelabs1:589215}    Medical Follow Up:      {fgsdischargefollowup:241168}    MTM referral needed and placed by this provider: {YES (EXPLAIN)/NO:442027}    Current [place] scheduled appointments:  Next 5 appointments (look out 90 days)    Jun 03, 2019  2:30 PM CDT  [facility] [address] [address]  [phone]   Jun 26, 2019  1:50 PM CDT  Return Visit with ALLEN Arrington CNP  [facility] ([facility]) [address]  [facility]  [address]  [phone] OPT 2       ***    Discharge Services: {fgsdcservices1:607678}    Discharge Instructions Verbalized to Patient at Discharge:     {fgsdischargeinstructions1:759146}      TOTAL DISCHARGE TIME:   {TIME:938157}  Electronically signed by:  Bill Marie ***    {Replaced by [facility] F2F:616243}  {Providers Please double check the med list (in the plan section >> meds & orders tab) and Discontinue any of the meds flagged by the TC to be discontinued}

## 2019-05-28 NOTE — PROGRESS NOTES
[place] [facility] DISCHARGE SUMMARY  PATIENT'S NAME: Helene Gibbs  YOB: 1935  MEDICAL RECORD NUMBER:  [id number]  Place of Service where encounter took place:  [facility] ([facility]) [[id number]]    PRIMARY CARE PROVIDER AND CLINIC RESPONSIBLE AFTER TRANSFER:   Neisha Esparza MD, [address] [place]                [place] [address]    Assisted Living: [facility]     Transferring providers: Tonya Haase, CNP, Marino Martin MD  Recent Hospitalization/ED:  [facility] Hospital stay 5/3/19 to 5/10/19.  Date of SNF Admission: May / 10 / 2019  Date of SNF (anticipated) Discharge: May / 28 / 2019  Discharged to: previous assisted living  Cognitive Scores: BIMS: 15/15/15 and Short blessed: 4/28  Physical Function: Ambulating 160 feet  ft with 4WW  DME: Walker    CODE STATUS/ADVANCE DIRECTIVES DISCUSSION:  DNR / DNI     ALLERGIES: Ambien [zolpidem tartrate]; Penicillins; Definity; Sulfa drugs; Cymbalta; and Fluconazole    DISCHARGE DIAGNOSIS/NURSING FACILITY COURSE:   Patient progressed in therapy to walking up to 160 feet using a 4WW indep, indep with ADL's, has O2 at home which she uses prn, weight up during TCU stay, did increase torsemide to 20mg BID over the weekend with weight down from  172.1lbs to 168.3lbs, patient states she is feeling well, no increased SOB, cough or congestion. Patient will return to [facility] with home RN and HHA through [facility].     Past Medical History:  has a past medical history of Anemia, Ankle arthritis, Arthritis, Back pain, Bell's palsy (9/08), Breast CA (H) (2004-), Breast cancer (H) (2004), CKD (chronic kidney disease), Colon polyps, Congestive heart failure (H), COPD (chronic obstructive pulmonary disease) (H), Coronary artery disease, DM (diabetes mellitus) (H), GERD (gastroesophageal reflux disease), Hyperlipidemia, Hypertension, Lumbar spinal stenosis, Nicotine dependence, Obesity, Osteoporosis, Other chronic pain,  Pacemaker, Permanent atrial fibrillation (H) (8/2008), Pleural effusion, Pulmonary hypertension (H), PVD (peripheral vascular disease) (H), Rectal stenosis, Tobacco abuse, and Tricuspid regurgitation. She also has no past medical history of Difficult intubation, Malignant hyperthermia, or PONV (postoperative nausea and vomiting).    Discharge Medications:  Current Outpatient Medications   Medication Sig Dispense Refill     ACCU-CHEK SMARTVIEW test strip USE 1 STRIP BY IN VITRO ROUTE 2 TIMES DAILY OR AS DIRECTED 200 strip 0     ACE/ARB/ARNI NOT PRESCRIBED (INTENTIONAL) Please choose reason not prescribed, below       albuterol (PROAIR HFA, PROVENTIL HFA, VENTOLIN HFA) 108 (90 BASE) MCG/ACT inhaler Inhale 2 puffs into the lungs every 4 hours as needed for shortness of breath / dyspnea or wheezing 1 Inhaler 5     apixaban ANTICOAGULANT (ELIQUIS) 2.5 MG tablet Take 1 tablet (2.5 mg) by mouth 2 times daily 60 tablet 1     ASPIRIN NOT PRESCRIBED (INTENTIONAL) Please choose reason not prescribed, below       betamethasone valerate (VALISONE) 0.1 % external cream APPLY TOPICALLY 2 TIMES DAILY USE SPARINGLY (Patient taking differently: APPLY TOPICALLY TO RASH UNDER BREAST DAILY USE SPARINGLY) 15 g 1     bisacodyl (DULCOLAX) 5 MG EC tablet Take 10 mg by mouth At Bedtime       blood glucose monitoring (NO BRAND SPECIFIED) meter device kit Accucheck Mariza meter; verified with pharmacist 1 kit 0     denosumab (PROLIA) 60 MG/ML SOLN injection Inject 1 mL (60 mg) Subcutaneous every 6 months 1 mL 1     fluticasone (FLONASE) 50 MCG/ACT nasal spray Spray 2 sprays into both nostrils daily (Patient taking differently: Spray 2 sprays into both nostrils every evening ) 15.8 mL 11     glipiZIDE (GLUCOTROL) 5 MG tablet Take 1 tablet (5 mg) by mouth every morning (before breakfast) 90 tablet 1     hydrOXYzine (VISTARIL) 25 MG capsule Take 1 capsule (25 mg) by mouth 2 times daily as needed for itching 180 capsule 1     hypromellose  "(ARTIFICIAL TEARS) 0.5 % SOLN ophthalmic solution Place 1 drop into both eyes every hour as needed for dry eyes       lidocaine (LIDODERM) 5 % patch Place 1 patch onto the skin daily as needed back       metoprolol tartrate (LOPRESSOR) 25 MG tablet TAKE 1 TABLET BY MOUTH TWICE A DAY 90 tablet 1     pregabalin (LYRICA) 75 MG capsule Take 1 capsule (75 mg) by mouth 2 times daily 10 capsule 1     PROCTOSOL HC 2.5 % cream PLACE RECTALLY 2 TIMES DAILY AS NEEDED FOR HEMORRHOIDS 28.35 g 1     ranitidine (ZANTAC) 150 MG tablet Take 1 tablet (150 mg) by mouth At Bedtime (Patient taking differently: Take 150 mg by mouth daily ) 90 tablet 3     simvastatin (ZOCOR) 10 MG tablet Take 1 tablet (10 mg) by mouth At Bedtime 90 tablet 3     tiotropium (SPIRIVA) 18 MCG inhaled capsule Inhale 1 capsule (18 mcg) into the lungs daily 90 capsule 1     torsemide (DEMADEX) 20 MG tablet TAKE 1 TABLET BY MOUTH EVERY DAY 90 tablet 1     traMADol (ULTRAM) 50 MG tablet Take 1 tablet (50 mg) by mouth every 6 hours as needed for severe pain 12 tablet      torsemide (DEMADEX) 20 MG tablet Take 20 mg by mouth 3 times daily         Medication Changes/Rationale:     Increased torsemide to 20mg BID from 5/24-5/26, weight starting to trend up again at torsemide 20mg QD    Recommend torsemide 20mg BID ongoing    Labs were drawn this AM but will not be back before patient discharges    Controlled medications sent with patient:   not applicable/none     ROS:   10 point ROS of systems including Constitutional, Eyes, Respiratory, Cardiovascular, Gastroenterology, Genitourinary, Integumentary, Musculoskeletal, Psychiatric were all negative except for pertinent positives noted in my HPI.    Physical Exam:   Vitals: /52   Pulse 70   Temp 98.3  F (36.8  C)   Resp 24   Ht 1.651 m (5' 5\")   Wt 76.2 kg (168 lb)   LMP  (LMP Unknown)   SpO2 92%   BMI 27.96 kg/m    BMI= Body mass index is 27.96 kg/m .  Exam:  GENERAL APPEARANCE:  Alert, in no distress, " morbidly obese  ENT:  Mouth and posterior oropharynx normal, moist mucous membranes, [place]  EYES:  EOM, conjunctivae, lids, pupils and irises normal, PERRL  RESP:  respiratory effort and palpation of chest normal, lungs clear to auscultation , no respiratory distress, diminished breath sounds bases bilaterally with fine crackles left base noted  CV:  Palpation and auscultation of heart done , regular rate and rhythm, no murmur, rub, or gallop, peripheral edema trace+ in LE bilaterally  ABDOMEN:  normal bowel sounds, soft, nontender, no hepatosplenomegaly or other masses  M/S:   Examination of:   right upper extremity, left upper extremity, right lower extremity and left lower extremity  Inspection, ROM, stability and muscle strength normal and generalized weakness noted  SKIN:  Inspection of skin and subcutaneous tissue baseline  NEURO:   Cranial nerves 2-12 are normal tested and grossly at patient's baseline, speech WNL  PSYCH:  affect and mood normal     ASSESSMENT/PLAN:  Acute on chronic diastolic CHF (congestive heart failure) (H)  Essential hypertension  Chronic atrial fib, S/P ablation -- on Eliquis  Physical deconditioning  Acute/ongoing: continue daily weights,  Increase torsemide to 20mg BID ongoing no stop date, continue aldactone 25mg QD, metoprolol 25mg BID, apixaban 2.5mg BID,  F/u with cardiology [facility] as directed   [facility] with home RN and HHA through [facility]     CKD (chronic kidney disease) stage 4, GFR 15-29 ml/min (H)  Ongoing: baseline creat 1.3-2.2  BMP again on tuesday, creat today 2.1     HCAP (healthcare-associated pneumonia)  COPD  Acute/ongoing: finished levaquin and mucinex, continue albuterol MDI 2 puffs q 4 hours prn, spiriva 18mcg QD  O2 prn per home routine     Iron deficiency anemia due to chronic blood loss  Ongoing: Hgb stable, f/u with PCP     Type 2 diabetes mellitus with diabetic nephropathy, without long-term current use of insulin (H)  Ongoing:  glipizide  5mg QAM, blood sugar monitoring per home routine     Chronic low back pain with sciatica, sciatica laterality unspecified, unspecified back pain laterality  Ongoing: continue lyrica 75mg BID and tramadol 50mg q 6 hours prn    SNF labs: Recent labs in EPIC reviewed by me today.       DISCHARGE PLAN:    Follow up labs: BMP  due 5/30/19 to be drawn by home care with results to PCP and CORE clinic    Medical Follow Up:      Follow up with primary care provider in 1 week    [facility] referral needed and placed by this provider: No    Current [place] scheduled appointments:  Next 5 appointments (look out 90 days)    Jun 03, 2019  2:30 PM CDT  [facility] Visit with Neisha [facility]za MD  [facility] [address] [id number] [phone]   Jun 26, 2019  1:50 PM CDT  Return Visit with ALLEN Arrington CNP  [facility] ([facility]) [address]  [facility] [id number] [phone] OPT 2           Discharge Services: Home Care:  Registered Nurse and Home Health Aide    Discharge Instructions Verbalized to Patient at Discharge:     None      TOTAL DISCHARGE TIME:   Greater than 30 minutes  Electronically signed by:  Tonya Lynn Haase, APRN CNP     Documentation of Face to Face and Certification for Home Health Services    I certify that patient: Helene Gibbs is under my care and that I, or a nurse practitioner or physician's assistant working with me, had a face-to-face encounter that meets the physician face-to-face encounter requirements with this patient on: 5/28/2019.    This encounter with the patient was in whole, or in part, for the following medical condition, which is the primary reason for home health care: CHF, COPD.    I certify that, based on my findings, the following services are medically necessary home health services: Nursing.    My clinical findings support the need for the above services because:  Nurse is needed: To assess vitals, SaO2, daily weights, medication management, draw BMP on 5/30/19 after changes in medications or other medical regimen..    Further, I certify that my clinical findings support that this patient is homebound (i.e. absences from home require considerable and taxing effort and are for medical reasons or Spiritism services or infrequently or of short duration when for other reasons) because: Requires assistance of another person or specialized equipment to access medical services because patient:  using an assistive device for mobility..    Based on the above findings. I certify that this patient is confined to the home and needs intermittent skilled nursing care, physical therapy and/or speech therapy.  The patient is under my care, and I have initiated the establishment of the plan of care.  This patient will be followed by a physician who will periodically review the plan of care.  Physician/Provider to provide follow up care: Neisha Esparza    [facility] physician (the Medicare certified PECOS provider): Tonya Lynn Haase, Dr.Dan Mario MD, signing F2F and only signing for initial order. Please send all follow up questions and concerns or needed follow up signatures to the PCP, who [facility] has on file as:  Neisha Esparza.    Physician Signature: See electronic signature associated with these discharge orders.  Date: 5/28/2019

## 2019-05-28 NOTE — PROGRESS NOTES
Incoming return call from patients nurse. She confirmed patient has DC'd. Patients weight this am 169.2#. Patient discharged on torsemide 20mg bid as ordered by facility NP. Patient on RA at discontinue, 02 sat 92%.  Overnight patient requested supplemental 02 despite sat of 91%. With 0.5LPM, her sat increased to 97%.     Future Appointments   Date Time Provider Department Center   6/3/2019  2:30 PM Neisha Esparza MD CSFPIM CS   6/26/2019 12:50 PM [facility] PSA CLIN   6/26/2019  1:50 PM [facility]ady[facility]   7/16/2019 12:00 PM [facility]     Follow up appts as above.  Renita Chatterjee RN on 5/28/2019 at 11:38 AM

## 2019-05-28 NOTE — LETTER
5/28/2019        RE: Helene Gibbs  [address] [address]        [place] [facility] DISCHARGE SUMMARY  PATIENT'S NAME: Helene Gibbs  YOB: 1935  MEDICAL RECORD NUMBER:  [id number]  Place of Service where encounter took place:  [facility] ([facility] [[id number]]    PRIMARY CARE PROVIDER AND CLINIC RESPONSIBLE AFTER TRANSFER:   Neisha Esparza MD, [address] [place]                [place] [address]    Assisted Living: [facility]     Transferring providers: Tonya Haase, CNP, Marino Martin MD  [facility]ED[facility] 5/10/19.  Date of SNF Admission: May / 10 / 2019  Date of SNF (anticipated) Discharge: May / 28 / 2019  Discharged to: previous assisted living  Cognitive Scores: BIMS: 15/15/15 and Short blessed: 4/28  Physical Function: Ambulating 160 feet  ft with 4WW  DME: Walker    CODE STATUS/ADVANCE DIRECTIVES DISCUSSION:  DNR / DNI     ALLERGIES: Ambien [zolpidem tartrate]; Penicillins; Definity; Sulfa drugs; Cymbalta; and Fluconazole    DISCHARGE DIAGNOSIS/NURSING FACILITY COURSE:   Patient progressed in therapy to walking up to 160 feet using a 4WW indep, indep with ADL's, has O2 at home which she uses prn, weight up during TCU stay, did increase torsemide to 20mg BID over the weekend with weight down from  172.1lbs to 168.3lbs, patient states she is feeling well, no increased SOB, cough or congestion. Patient will return to [facility] with home RN and HHA through [facility].     Past Medical History:  has a past medical history of Anemia, Ankle arthritis, Arthritis, Back pain, Bell's palsy (9/08), Breast CA (H) (2004-), Breast cancer (H) (2004), CKD (chronic kidney disease), Colon polyps, Congestive heart failure (H), COPD (chronic obstructive pulmonary disease) (H), Coronary artery disease, DM (diabetes mellitus) (H), GERD (gastroesophageal reflux disease), Hyperlipidemia, Hypertension, Lumbar  spinal stenosis, Nicotine dependence, Obesity, Osteoporosis, Other chronic pain, Pacemaker, Permanent atrial fibrillation (H) (8/2008), Pleural effusion, Pulmonary hypertension (H), PVD (peripheral vascular disease) (H), Rectal stenosis, Tobacco abuse, and Tricuspid regurgitation. She also has no past medical history of Difficult intubation, Malignant hyperthermia, or PONV (postoperative nausea and vomiting).    Discharge Medications:  Current Outpatient Medications   Medication Sig Dispense Refill     ACCU-CHEK SMARTVIEW test strip USE 1 STRIP BY IN VITRO ROUTE 2 TIMES DAILY OR AS DIRECTED 200 strip 0     ACE/ARB/ARNI NOT PRESCRIBED (INTENTIONAL) Please choose reason not prescribed, below       albuterol (PROAIR HFA, PROVENTIL HFA, VENTOLIN HFA) 108 (90 BASE) MCG/ACT inhaler Inhale 2 puffs into the lungs every 4 hours as needed for shortness of breath / dyspnea or wheezing 1 Inhaler 5     apixaban ANTICOAGULANT (ELIQUIS) 2.5 MG tablet Take 1 tablet (2.5 mg) by mouth 2 times daily 60 tablet 1     ASPIRIN NOT PRESCRIBED (INTENTIONAL) Please choose reason not prescribed, below       betamethasone valerate (VALISONE) 0.1 % external cream APPLY TOPICALLY 2 TIMES DAILY USE SPARINGLY (Patient taking differently: APPLY TOPICALLY TO RASH UNDER BREAST DAILY USE SPARINGLY) 15 g 1     bisacodyl (DULCOLAX) 5 MG EC tablet Take 10 mg by mouth At Bedtime       blood glucose monitoring (NO BRAND SPECIFIED) meter device kit Accucheck Mariza meter; verified with pharmacist 1 kit 0     denosumab (PROLIA) 60 MG/ML SOLN injection Inject 1 mL (60 mg) Subcutaneous every 6 months 1 mL 1     fluticasone (FLONASE) 50 MCG/ACT nasal spray Spray 2 sprays into both nostrils daily (Patient taking differently: Spray 2 sprays into both nostrils every evening ) 15.8 mL 11     glipiZIDE (GLUCOTROL) 5 MG tablet Take 1 tablet (5 mg) by mouth every morning (before breakfast) 90 tablet 1     hydrOXYzine (VISTARIL) 25 MG capsule Take 1 capsule (25 mg) by  "mouth 2 times daily as needed for itching 180 capsule 1     hypromellose (ARTIFICIAL TEARS) 0.5 % SOLN ophthalmic solution Place 1 drop into both eyes every hour as needed for dry eyes       lidocaine (LIDODERM) 5 % patch Place 1 patch onto the skin daily as needed back       metoprolol tartrate (LOPRESSOR) 25 MG tablet TAKE 1 TABLET BY MOUTH TWICE A DAY 90 tablet 1     pregabalin (LYRICA) 75 MG capsule Take 1 capsule (75 mg) by mouth 2 times daily 10 capsule 1     PROCTOSOL HC 2.5 % cream PLACE RECTALLY 2 TIMES DAILY AS NEEDED FOR HEMORRHOIDS 28.35 g 1     ranitidine (ZANTAC) 150 MG tablet Take 1 tablet (150 mg) by mouth At Bedtime (Patient taking differently: Take 150 mg by mouth daily ) 90 tablet 3     simvastatin (ZOCOR) 10 MG tablet Take 1 tablet (10 mg) by mouth At Bedtime 90 tablet 3     tiotropium (SPIRIVA) 18 MCG inhaled capsule Inhale 1 capsule (18 mcg) into the lungs daily 90 capsule 1     torsemide (DEMADEX) 20 MG tablet TAKE 1 TABLET BY MOUTH EVERY DAY 90 tablet 1     traMADol (ULTRAM) 50 MG tablet Take 1 tablet (50 mg) by mouth every 6 hours as needed for severe pain 12 tablet      torsemide (DEMADEX) 20 MG tablet Take 20 mg by mouth 3 times daily         Medication Changes/Rationale:     Increased torsemide to 20mg BID from 5/24-5/26, weight starting to trend up again at torsemide 20mg QD    Recommend torsemide 20mg BID ongoing    Labs were drawn this AM but will not be back before patient discharges    Controlled medications sent with patient:   not applicable/none     ROS:   10 point ROS of systems including Constitutional, Eyes, Respiratory, Cardiovascular, Gastroenterology, Genitourinary, Integumentary, Musculoskeletal, Psychiatric were all negative except for pertinent positives noted in my HPI.    Physical Exam:   Vitals: /52   Pulse 70   Temp 98.3  F (36.8  C)   Resp 24   Ht 1.651 m (5' 5\")   Wt 76.2 kg (168 lb)   LMP  (LMP Unknown)   SpO2 92%   BMI 27.96 kg/m     BMI= Body mass " index is 27.96 kg/m .  Exam:  GENERAL APPEARANCE:  Alert, in no distress, morbidly obese  ENT:  Mouth and posterior oropharynx normal, moist mucous membranes, [place]  EYES:  EOM, conjunctivae, lids, pupils and irises normal, PERRL  RESP:  respiratory effort and palpation of chest normal, lungs clear to auscultation , no respiratory distress, diminished breath sounds bases bilaterally with fine crackles left base noted  CV:  Palpation and auscultation of heart done , regular rate and rhythm, no murmur, rub, or gallop, peripheral edema trace+ in LE bilaterally  ABDOMEN:  normal bowel sounds, soft, nontender, no hepatosplenomegaly or other masses  M/S:   Examination of:   right upper extremity, left upper extremity, right lower extremity and left lower extremity  Inspection, ROM, stability and muscle strength normal and generalized weakness noted  SKIN:  Inspection of skin and subcutaneous tissue baseline  NEURO:   Cranial nerves 2-12 are normal tested and grossly at patient's baseline, speech WNL  PSYCH:  affect and mood normal     ASSESSMENT/PLAN:  Acute on chronic diastolic CHF (congestive heart failure) (H)  Essential hypertension  Chronic atrial fib, S/P ablation -- on Eliquis  Physical deconditioning  Acute/ongoing: continue daily weights,  Increase torsemide to 20mg BID  ongoing no stop date, continue aldactone 25mg QD, metoprolol 25mg BID, apixaban 2.5mg BID,  F/u with cardiology [facility] clinic as directed   DC [facility] with home RN and HHA through [facility]     CKD (chronic kidney disease) stage 4, GFR 15-29 ml/min (H)  Ongoing: baseline creat 1.3-2.2  BMP again on tuesday, creat today 2.1     HCAP (healthcare-associated pneumonia)  COPD  Acute/ongoing: finished levaquin and mucinex, continue albuterol MDI 2 puffs q 4 hours prn, spiriva 18mcg QD  O2 prn per home routine     Iron deficiency anemia due to chronic blood loss  Ongoing: Hgb stable, f/u with PCP     Type 2 diabetes mellitus with diabetic  nephropathy, without long-term current use of insulin (H)  Ongoing:  glipizide 5mg QAM, blood sugar monitoring per home routine     Chronic low back pain with sciatica, sciatica laterality unspecified, unspecified back pain laterality  Ongoing: continue lyrica 75mg BID and tramadol 50mg q 6 hours prn    SNF labs: Recent labs in EPIC reviewed by me today.       DISCHARGE PLAN:    Follow up labs: BMP  due 5/30/19 to be drawn by home care with results to PCP and C[facility] clinic    Medical Follow Up:      Follow up with primary care provider in 1 week    [place] referral needed and placed by this provider: No    Current [facility] scheduled appointments:  Next 5 appointments (look out 90 days)    Jun 03, 2019  2:30 PM CDT  Office Visit with Neisha [facility]arza MD  [facility] [address] [place] [address]  [phone]   Jun 26, 2019  1:50 PM CDT  Return Visit with ALLEN Arrington CNP  [facility] ([facility]) [address] [place] [address] [address]  [facility] [address]  [phone] OPT 2           Discharge Services: Home Care:  Registered Nurse and Home Health Aide    Discharge Instructions Verbalized to Patient at Discharge:     None      TOTAL DISCHARGE TIME:   Greater than 30 minutes  Electronically signed by:  Tonya Lynn Haase, APRN CNP     Documentation of Face to Face and Certification for Home Health Services    I certify that patient: Helene Gibbs is under my care and that I, or a nurse practitioner or physician's assistant working with me, had a face-to-face encounter that meets the physician face-to-face encounter requirements with this patient on: 5/28/2019.    This encounter with the patient was in whole, or in part, for the following medical condition, which is the primary reason for home health care: CHF, COPD.    I certify that, based on my findings, the following services are medically necessary home health services:  Nursing.    My clinical findings support the need for the above services because: Nurse is needed: To assess vitals, SaO2, daily weights, medication management, draw BMP on 5/30/19 after changes in medications or other medical regimen..    Further, I certify that my clinical findings support that this patient is homebound (i.e. absences from home require considerable and taxing effort and are for medical reasons or Caodaism services or infrequently or of short duration when for other reasons) because: Requires assistance of another person or specialized equipment to access medical services because patient:  using an assistive device for mobility..    Based on the above findings. I certify that this patient is confined to the home and needs intermittent skilled nursing care, physical therapy and/or speech therapy.  The patient is under my care, and I have initiated the establishment of the plan of care.  This patient will be followed by a physician who will periodically review the plan of care.  Physician/Provider to provide follow up care: Neisha Esparza    Attending \[facility]\"" physician (the Medicare certified PECOS provider): Tonya Lynn Haase, Dr.Dan Stein, MD, signing F2F and only signing for initial order. Please send all follow up questions and concerns or needed follow up signatures to the PCP, who [facility] has on file as:  Neisha Esparza.    Physician Signature: See electronic signature associated with these discharge orders.  Date: 5/28/2019                  Sincerely,        Tonya Lynn Haase, APRN CNP

## 2019-05-28 NOTE — PROGRESS NOTES
[facility]  PRIMARY CARE PROVIDER AND CLINIC:  Neisha Esparza MD, [address] [facility] [address] / [facility] [address]      Patient was seen by Dr. Martin at the [facility] on May 25, 2019 for initial TCU visit.    Patient is a 84 year old  (1935), admitted to the above facility from  [facility]. Hospital stay 5/3/19 through 5/10/19..  Admitted to this facility for  rehab, medical management and nursing care.    Hospital course was reviewed by me, is as per the hospital discharge summary and nurse practitioner note.    Patient was admitted with worsening shortness of breath, increasing oxygen requirements, was found to have acute diastolic heart failure exacerbation in addition to pneumonia.  Resp status improved with increase in diuretic therapy.  She was treated with levofloxacin for possible pneumonia.    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  History of chronic atrial fibrillation, status post ablation and pacemaker placement, currently on apixaban, though with history of GI bleeding, watchman device placement has been scheduled.  History of diabetes mellitus type 2 on glipizide.  History of chronic kidney disease stage IV.  Most recent creatinine yesterday, stable at 1.91.  Hemoglobin was 9.2 on May 22, 2019    Patient reports feeling very well, is anxious to discharge.  She has agreed to stay in the hospital for the next few days as weight has been up since admission to TCU (currently 7 pounds).  Torsemide dose has been increased to 20 mg twice a day.  She remains on Aldactone 25 mg daily.    Patient notes improved shortness of breath since admission to the hospital.  She denies cough, chest pain, dizziness, abdominal pain, nausea, vomiting.  She denies melena or rectal bleeding.  Blood glucoses have been in the 100s to 200s for the most part        CODE STATUS/ADVANCE  DIRECTIVES DISCUSSION:   DNR / DNI  Patient's living condition: lives alone  ALLERGIES: Ambien [zolpidem tartrate]; Penicillins; Definity; Sulfa drugs; Cymbalta; and Fluconazole  PAST MEDICAL HISTORY:  has a past medical history of Anemia, Ankle arthritis, Arthritis, Back pain, Bell's palsy (9/08), Breast CA (H) (2004-), Breast cancer (H) (2004), CKD (chronic kidney disease), Colon polyps, Congestive heart failure (H), COPD (chronic obstructive pulmonary disease) (H), Coronary artery disease, DM (diabetes mellitus) (H), GERD (gastroesophageal reflux disease), Hyperlipidemia, Hypertension, Lumbar spinal stenosis, Nicotine dependence, Obesity, Osteoporosis, Other chronic pain, Pacemaker, Permanent atrial fibrillation (H) (8/2008), Pleural effusion, Pulmonary hypertension (H), PVD (peripheral vascular disease) (H), Rectal stenosis, Tobacco abuse, and Tricuspid regurgitation. She also has no past medical history of Difficult intubation, Malignant hyperthermia, or PONV (postoperative nausea and vomiting).  PAST SURGICAL HISTORY:   has a past surgical history that includes arthroscopy shoulder rt/lt (1999, 2004); joint replacemtn, knee rt/lt; COLONOSCOPY THRU STOMA, DIAGNOSTIC (? 2005); MAMMOGRAM, SCREENING (1/2009); DEXA, BONE DENSITY, AXIAL SKEL; Lumpectomy breast; Implant pacemaker (10/2012); EP Ablation AV node (10/2012); Phacoemulsification clear cornea with standard intraocular lens implant (Left, 7/16/2015); Laparoscopic cholecystectomy with cholangiograms (N/A, 12/14/2015); Colonoscopy (N/A, 10/7/2016); Phacoemulsification clear cornea with toric intraocular lens implant (Right, 5/9/2017); Esophagoscopy, gastroscopy, duodenoscopy (EGD), combined (N/A, 8/10/2017); Bone marrow biopsy, bone specimen, needle/trocar (N/A, 8/29/2017); Arthroplasty hip (Left, 10/20/2018); Irrigation and debridement lower extremity, combined (Left, 12/21/2018); Esophagoscopy, gastroscopy, duodenoscopy (EGD), combined (N/A, 2/15/2019); and  Colonoscopy (N/A, 2/15/2019).  FAMILY HISTORY: family history includes C.A.D. in her father; Diabetes in her mother; Hypertension in her mother.  SOCIAL HISTORY:   reports that she quit smoking about a year ago. Her smoking use included cigarettes. She has a 11.25 pack-year smoking history. She has never used smokeless tobacco. She reports that she does not drink alcohol or use drugs.      Current Outpatient Medications   Medication Sig Dispense Refill     ACCU-CHEK SMARTVIEW test strip USE 1 STRIP BY IN VITRO ROUTE 2 TIMES DAILY OR AS DIRECTED 200 strip 0     ACE/ARB/ARNI NOT PRESCRIBED (INTENTIONAL) Please choose reason not prescribed, below       albuterol (PROAIR HFA, PROVENTIL HFA, VENTOLIN HFA) 108 (90 BASE) MCG/ACT inhaler Inhale 2 puffs into the lungs every 4 hours as needed for shortness of breath / dyspnea or wheezing 1 Inhaler 5     apixaban ANTICOAGULANT (ELIQUIS) 2.5 MG tablet Take 1 tablet (2.5 mg) by mouth 2 times daily 60 tablet 1     ASPIRIN NOT PRESCRIBED (INTENTIONAL) Please choose reason not prescribed, below       betamethasone valerate (VALISONE) 0.1 % external cream APPLY TOPICALLY 2 TIMES DAILY USE SPARINGLY (Patient taking differently: APPLY TOPICALLY TO RASH UNDER BREAST DAILY USE SPARINGLY) 15 g 1     bisacodyl (DULCOLAX) 5 MG EC tablet Take 10 mg by mouth At Bedtime       blood glucose monitoring (NO BRAND SPECIFIED) meter device kit Accucheck Mariza meter; verified with pharmacist 1 kit 0     denosumab (PROLIA) 60 MG/ML SOLN injection Inject 1 mL (60 mg) Subcutaneous every 6 months 1 mL 1     fluticasone (FLONASE) 50 MCG/ACT nasal spray Spray 2 sprays into both nostrils daily (Patient taking differently: Spray 2 sprays into both nostrils every evening ) 15.8 mL 11     glipiZIDE (GLUCOTROL) 5 MG tablet Take 1 tablet (5 mg) by mouth every morning (before breakfast) 90 tablet 1     hydrOXYzine (VISTARIL) 25 MG capsule Take 1 capsule (25 mg) by mouth 2 times daily as needed for itching 180  capsule 1     hypromellose (ARTIFICIAL TEARS) 0.5 % SOLN ophthalmic solution Place 1 drop into both eyes every hour as needed for dry eyes       lidocaine (LIDODERM) 5 % patch Place 1 patch onto the skin daily as needed back       metoprolol tartrate (LOPRESSOR) 25 MG tablet TAKE 1 TABLET BY MOUTH TWICE A DAY 90 tablet 1     pregabalin (LYRICA) 75 MG capsule Take 1 capsule (75 mg) by mouth 2 times daily 10 capsule 1     PROCTOSOL HC 2.5 % cream PLACE RECTALLY 2 TIMES DAILY AS NEEDED FOR HEMORRHOIDS 28.35 g 1     ranitidine (ZANTAC) 150 MG tablet Take 1 tablet (150 mg) by mouth At Bedtime (Patient taking differently: Take 150 mg by mouth daily ) 90 tablet 3     simvastatin (ZOCOR) 10 MG tablet Take 1 tablet (10 mg) by mouth At Bedtime 90 tablet 3     tiotropium (SPIRIVA) 18 MCG inhaled capsule Inhale 1 capsule (18 mcg) into the lungs daily 90 capsule 1     torsemide (DEMADEX) 20 MG tablet Take 20 mg by mouth 3 times daily       torsemide (DEMADEX) 20 MG tablet TAKE 1 TABLET BY MOUTH EVERY DAY (Patient not taking: Reported on 5/23/2019) 90 tablet 1     traMADol (ULTRAM) 50 MG tablet Take 1 tablet (50 mg) by mouth every 6 hours as needed for severe pain 12 tablet        ROS:  10 point ROS negative except as noted above.    Exam:  GENERAL APPEARANCE:  Alert, sitting up in bed, appears comfortable.  She is currently wearing oxygen.  ENT: Oral mucosa moist   EYES: No eye redness or drainage  RESP: Respiratory rate 14, unlabored.  Bibasilar crackles.  CV: RRR  ABDOMEN: Soft, nontender   M/S: Thoracic kyphosis.  2+ lower extremity edema bilaterally.  No focal weakness.  Gait was not assessed.  SKIN: No rash  NEURO: Alert, fully oriented, pleasant.  PSYCH:  affect and mood normal        ASSESSMENT/PLAN:        Acute on chronic diastolic CHF (congestive heart failure) (H)  Essential hypertension  Chronic atrial fib, S/P ablation -- on Eliquis  Chronic kidney disease stage IV  Comment: Patient is doing well overall though  weight has increased since admission to TCU as noted above.  Torsemide dose has been increased to 20 mg twice a day.  Assuming patient does diuresis gradually on this regimen, she may be of discharge to home early next week.  For now, will closely monitor weight, vital signs and basic metabolic panel in view of chronic kidney disease stage IV      HCAP (healthcare-associated pneumonia)  COPD  Patient is completed a course of Levaquin.  Restaurant status appears stable  Plan monitor O2 sats, continue oxygen at night and as needed during the daytime.  Continue current inhaler regimen.    Anemia  History of GI bleed, stable on resumed Eliquis  Plan monitor hemoglobin.  Continue Eliquis for now.  Watchman device likely to be placed in 1 to 2 months.    Type 2 diabetes mellitus with diabetic nephropathy, without long-term current use of insulin (H)  Fair control  Plan: Continue glipizide.  Monitor blood glucoses      Marino Martin MD

## 2019-05-28 NOTE — PROGRESS NOTES
Call to KADEEM Jameson to assess response to increased torsemide dose X3 days as ordered by karthikeyan Grady on 5/23. I was informed patient has DC'd home. LM for nurse who cared for her to call back.  Renita Chatterjee RN on 5/28/2019 at 11:30 AM

## 2019-05-30 NOTE — TELEPHONE ENCOUNTER
Asked if pt would like to move up her Watchman procedure date.  Pt states that she would like to get stronger and will keep the date as is on 7/16. [email or web address]

## 2019-05-30 NOTE — TELEPHONE ENCOUNTER
TO PCP:     Called and LVM giving verbal on requested home care orders and reminding home care of pt's upcoming visit     Also forwarding as FYI to PCP as noted per pt's chart last BMP checked 5/24/19, and per below message pt would like BMP checked at upcoming visit - 6/3/19    Shawanda REID RN

## 2019-05-30 NOTE — TELEPHONE ENCOUNTER
Reason for Call: Request for an order or referral:    Order or referral being requested: SKILLED NURS 2X A WK 3 WK 1 X A WK 2 WKS/ HOME HEALTH AIDE 2X AWK 5 WKS/     Date needed: as soon as possible    Has the patient been seen by the PCP for this problem? YES    Additional comments: BMP prev ordered not drawn/ pt would like it done at upcoming OV    Phone number Patient can be reached at:  Other phone number:  [phone] EPIFANIO    Best Time:      Can we leave a detailed message on this number?  YES    Call taken on 5/30/2019 at 12:22 PM by Jamshid Barrientos

## 2019-05-31 NOTE — TELEPHONE ENCOUNTER
Reason for Call:  Home Health Care    Holly with [facility] called regarding (reason for call):     Orders are needed for this patient.     PT: 1x/1w, 2x/4w,1x/2w for strenghening, gait training,balance training and home exercise program  OT:     Skilled Nursing:     Pt Provider: Sr. Esparza    Phone Number Homecare Nurse can be reached at: [phone]    Can we leave a detailed message on this number? YES    Phone number patient can be reached at: Other phone number:      Best Time:     Call taken on 5/31/2019 at 11:10 AM by Loreto Holliday

## 2019-06-03 NOTE — RESULT ENCOUNTER NOTE
Shakira Narayan,    This is to inform you regarding your test result.    Your hemoglobin is low  It is 8.4  It has dropped down compared to before  Recheck CBC in 1 week    Sincerely,      Dr.Nasima Vilma MD,FACP

## 2019-06-03 NOTE — LETTER
[facility]  Complex Care Plan  About Me:    Patient Name:  Helene Gibbs    YOB: 1935  Age:         84 year old   [facility] MRN:    [id number] Telephone Information:  Home Phone [phone]   Mobile [phone]       Address:  [address] [facility] [address] Email address:  No e-mail address on record      Emergency Contact(s)    Name Relationship Lgl Grd Work Phone Home Phone Mobile Phone   1. PARESH TABOR* Relative No  [phone]   2. TIERNEY CATALAN  Relative No   [phone]   3. GO CUI Friend No  [phone]            Primary language:  English     needed? No   Preferred Method of Communication:  Mail  Current living arrangement: I live in assisted living, I live alone    Health Maintenance  Health Maintenance Reviewed: Up to date    My Access Plan  Medical Emergency 911   Primary Clinic Line [facility] - [phone]   24 Hour Appointment Line [phone] or  [phone]) (toll-free)   24 Hour Nurse Line [phone] (toll-free)   Preferred Urgent Care [facility], [phone]   Preferred Hospital [facility]  [phone]   Preferred Pharmacy [facility] [address]     Behavioral Health Crisis Line The National Suicide Prevention Lifeline at 1-562.772.5414 or 911             My Care Team Members  Patient Care Team       Relationship Specialty Notifications Start End    Neisha Esparza MD PCP - General Internal Medicine  4/17/14     Phone: [phone] Pager: [phone] Fax: [phone] [address] [place] [address]    Shawanda Catherine APRN CNP Nurse Practitioner Nurse Practitioner  4/17/14     Phone: [phone] Fax: [phone] [address] [place] [address]    Neisha Esparza MD Assigned PCP   4/26/19     Phone: [phone] Pager: [phone] Fax:  [phone]        [address] [facility] [address] [place]                [place] [address]    Cally Campbell, RN Lead Care Coordinator Primary Care - CC  5/13/19             My Care Plans  Self Management and Treatment Plan  Goals and (Comments)  Goals        General    1. Psychosocial     Notes - Note created  6/5/2019 12:02 PM by Cally Campbell, RN    Goal Statement: Patient will transition from Home Care Services to Palliative Home Care or Hospice Care as soon as she is able to.  Measure of Success: Within 1-3 months, patient will transition from current HC services to another kind of service in which she has extra layers of support due to her chronic diseases, such as Palliative Home Care or Hospice Care.  Supportive Steps to Achieve:   1. Primary Care Provider, Dr. Esparza, has discussed topic with patient.   2. Dr. Esparza will call patient's niece, per patient's request, to talk about the transition of care topic.  3. RN Care Coordinator will support and assist patient and her niece with resources, when it is decided the kind of transition she wants.  Barriers: Patient has been having Home Care services through [facility] and would like to continue with that provider.  Strengths:     Patient has already established a good relationship with [facility].     Patient is open to discuss a transition process.  Date to Achieve By: 9/3/19  Patient expressed understanding of goal: Yes               Advance Care Plans/Directives Type:   Type Advanced Care Plans/Directives: Advanced Directive - On File, POLST    My Medical and Care Information  Problem List   Patient Active Problem List   Diagnosis     Type 2 diabetes mellitus with diabetic nephropathy (H)     Anemia     CKD (chronic kidney disease) stage 3, GFR 30-59 ml/min (H)     Chronic low back pain     Pleural effusion, left     Adjustment reaction with anxiety and depression     c diff colitis 11-12-12     Pulmonary hypertension (H)     Melanosis of colon      Diplopia     COPD (chronic obstructive pulmonary disease) (H)     Elevated TSH     CHF (congestive heart failure) (H)     Cardiac pacemaker in situ     Neck pain     Intractable back pain     Health Care Home     Mild cognitive impairment SLUMS = 22/ 30  CPT = 5.0/ 5.5 7-2014     Hypertension goal BP (blood pressure) < 140/90     Depression with anxiety     Tobacco abuse: [age]y/o on 8-12-14 @ 1ppd=50 pk yr hx      Stasis dermatitis     Thoracic disc herniation     Obesity     Hyperlipidemia     Nausea and vomiting     Cholelithiasis with acute on chronic cholecystitis     Slow transit constipation     Gastroesophageal reflux disease without esophagitis     Permanent atrial fibrillation (H)     Basal cell carcinoma of skin     Chronic pain syndrome     Tubular adenoma     Chronic atrial fib, S/P ablation -- on Eliquis     Gastrointestinal hemorrhage, unspecified gastrointestinal hemorrhage type     Iron deficiency     Adverse effect of iron     History of bone marrow biopsy     Herpes zoster without complication     Controlled substance agreement signed     Other chronic pain     Hematoma of groin     Groin hematoma, initial encounter     Pulmonary nodule     Chest pressure     NSTEMI (non-ST elevated myocardial infarction) (H)     (HFpEF) heart failure with preserved ejection fraction (H)     Generalized weakness     Leg fracture, left     CKD (chronic kidney disease) stage 4, GFR 15-29 ml/min (H)     S/p left hip fracture 11/20/18 -- persistent drainage     Anemia, iron deficiency     Peripheral vascular disease (H)     Acute respiratory failure with hypoxia (H)          Care Coordination Start Date:  06/03/2019   Frequency of Care Coordination: monthly   Form Last Updated:  06/03/2019

## 2019-06-03 NOTE — LETTER
[address] [facility]  [address]  [place]  [address]  Tel: [phone]    June 5, 2019    Helene Gibbs  [address] [facility] [address]        Dear Ms. Gibbs,    This is to inform you regarding your test result.    Your potassium is slightly low  Kidney function is slightly worse.  Avoid dehydration but avoid fluid overload also due to heart failure.   Recheck on your next office visit.    Sincerely,    Neisha Esparza MD/ASHLEY          Enclosure: Lab Results  Results for orders placed or performed in visit on 06/03/19   CBC with platelets and differential   Result Value Ref Range    WBC 6.2 4.0 - 11.0 10e9/L    RBC Count 3.47 (L) 3.8 - 5.2 10e12/L    Hemoglobin 8.4 (L) 11.7 - 15.7 g/dL    Hematocrit 28.8 (L) 35.0 - 47.0 %    MCV 83 78 - 100 fl    MCH 24.2 (L) 26.5 - 33.0 pg    MCHC 29.2 (L) 31.5 - 36.5 g/dL    RDW 18.5 (H) 10.0 - 15.0 %    Platelet Count 164 150 - 450 10e9/L    % Neutrophils 70.4 %    % Lymphocytes 8.7 %    % Monocytes 18.7 %    % Eosinophils 1.6 %    % Basophils 0.6 %    Absolute Neutrophil 4.4 1.6 - 8.3 10e9/L    Absolute Lymphocytes 0.5 (L) 0.8 - 5.3 10e9/L    Absolute Monocytes 1.2 0.0 - 1.3 10e9/L    Absolute Eosinophils 0.1 0.0 - 0.7 10e9/L    Absolute Basophils 0.0 0.0 - 0.2 10e9/L    Diff Method Automated Method    Basic metabolic panel   Result Value Ref Range    Sodium 138 133 - 144 mmol/L    Potassium 3.3 (L) 3.4 - 5.3 mmol/L    Chloride 97 94 - 109 mmol/L    Carbon Dioxide 32 20 - 32 mmol/L    Anion Gap 9 3 - 14 mmol/L    Glucose 148 (H) 70 - 99 mg/dL    Urea Nitrogen 62 (H) 7 - 30 mg/dL    Creatinine 2.11 (H) 0.52 - 1.04 mg/dL    GFR Estimate 21 (L) >60 mL/min/[1.73_m2]    GFR Estimate If Black 25 (L) >60 mL/min/[1.73_m2]    Calcium 7.9 (L) 8.5 - 10.1 mg/dL     *Note: Due to a large number of results and/or encounters for the requested time period, some results have not been displayed. A complete set of results can be found in Results Review.       
negative...

## 2019-06-03 NOTE — LETTER
[facility] CARE COORDINATION  [address]  [facility] [address]    Aleida 3, 2019    Helene Gibbs  [address] [facility] [address]      Dear Helene,    I wanted to thank you for spending the time to talk with me today.  I wanted to provide you with my contact information again, so that you can share it with your niece Enedelia. Below is a description of clinic care coordination and how we can further assist you.     The goal of clinic care coordination is to help you manage your health and improve access to the [facility] system in the most efficient manner. The registered nurse can assist you in meeting your health care goals by providing education, coordinating services, and strengthening the communication among your providers. The  can assist you with financial, behavioral, psychosocial, chemical dependency, counseling, and/or psychiatric resources.    Please feel free to contact me on the phones below with any questions or concerns. We at [facility] are focused on providing you with the highest-quality healthcare experience possible and that all starts with you.     Sincerely,         Cally Campbell RN Care Coordinator  [facility]  Phone:  [phone] (Mondays, Wednesdays & Fridays)  Phone:  [phone] (Tuesdays & Thursdays)  Email: [email or web address]      Enclosed: I have enclosed a copy of the Complex Care Plan. This has helpful information and goals that you have talked about with Dr. Esparza and me. Please keep this in an easy to access place to use as needed.

## 2019-06-03 NOTE — PROGRESS NOTES
Clinic Care Coordination Contact    Care Coordination Communication:  Face-to-face encounter     Clinical Data: Patient was at [facility], from 5/10/19 to 5/28/19. She was discharged back to [facility] where she resides, with home care services.  Of note: she was hospitalized at [facility] from 5/3/19 to 5/10/19 with diagnosis of acute diastolic congestive heart failure exacerbation; healthcare acquired pneumonia; generalized weakness. Patient was to have a Watchman, which was cancelled.  Other diagnoses:    COPD    Severe kyphosis with restrictive lung disease    Chronic atrial fibrillation status post ablation and pacemaker placement    HTN    Hyperlipidemia    CKD III-IV    Anemia, with history of colonic angioid ectasias    Diabetes mellitus type 2    Hx of Bell's Palsy    GERD    Pruritis    constipation     Assessment:  Home Care Contact:  Home Care Agency: [facility]   and phone number: [phone]  Care Coordination contacted home care: not yet  Anticipated start of care date: ongoing since 5/30/19    Patient Contact:   Introduced self and role of care coordination. Contact information given to patient for future needs.  Do you have any questions about your medications? No  Follow up appointment was today.  Home care has contacted patient: Yes  Patient questions/concerns: None    Plan: RN Care Coordinator will await notification from Home Care staff informing about patient's discharge plans/needs. RN Care Coordinator will review patient's chart every 4 weeks, as needed, and/or outreach to Home Care staff.    Cally Campbell RN Care Coordinator  [facility]  Phone:  [phone] (Mondays, Wednesdays & Fridays)  Phone:  [phone] (Tuesdays & Thursdays)  Email: [email or web address]      Addendum:  Dr. Esparza outreach to writer to request assistance from Care Coordination.    During OV consultation, Dr. Esparza has discussed with patient   that patient's health status warrants a transition from Home Care services to Palliative Care or Hospice Care within the next few months. She explained to patient that Care Coordination can support her with that transition of care.    RN Care Coordinator talked to patient about the difference between those services and provided her with some written information, as she came alone for the appointment and wants to discuss that with her niece Enedelia.    Plan:   RN Care Coordinator will enroll patient in Care Coordination.  PCP will call patient's niece, per patient request.  PCP or patient and her niece will contact RN CC when they need this writer's support with transition.    Cally Campbell RN Care Coordinator

## 2019-06-03 NOTE — PATIENT INSTRUCTIONS
I refilled your prescriptions  Labs today  Use erythromycin eye ointment on your left eye  Use nystatin powder twice daily on your rash, as needed  Use triamcinolone cream twice daily on your rash  Don't use for more than 14 days consecutively  You and your niece can come together to have discussion about serious illness conversation  Follow up in one month  Seek sooner medical attention if there is any worsening of symptoms or problems

## 2019-06-03 NOTE — PROGRESS NOTES
Subjective     Helene Gibbs is a 84 year old female who presents to clinic today for the following health issues:    HPI       Hospital Follow-up Visit:    Hospital/Nursing Home/IP Rehab Facility: [facility]   Date of Admission: 05/10/19  Date of Discharge: 05/28/19  Reason(s) for Admission: acute CHF            Problems taking medications regularly:  None       Medication changes since discharge: None       Problems adhering to non-medication therapy:  None    Patient wants to discuss her diuretics.       Lian Jones MA          Summary of hospitalization:  [facility] discharge summary reviewed  Geriatric services discharge summary reviewed  Diagnostic Tests/Treatments reviewed.  Follow up needed: Labs, PCP, [facility]  Other Healthcare Providers Involved in Patient s Care:         [facility]  Update since discharge: improved.     Post Discharge Medication Reconciliation: discharge medications reconciled and changed, per note/orders (see AVS).  Plan of care communicated with patient     Coding guidelines for this visit:  Type of Medical   Decision Making Face-to-Face Visit       within 7 Days of discharge Face-to-Face Visit        within 14 days of discharge   Moderate Complexity [id number]   High Complexity [id number]            Patient has been evaluated by PT  Home healthcare is coming next week    Patient reports she was in the bathroom all day yesterday due to hemorrhoids    Patient Active Problem List   Diagnosis     Type 2 diabetes mellitus with diabetic nephropathy (H)     Anemia     CKD (chronic kidney disease) stage 3, GFR 30-59 ml/min (H)     Chronic low back pain     Pleural effusion, left     Adjustment reaction with anxiety and depression     c diff colitis 11-12-12     Pulmonary hypertension (H)     Melanosis of colon     Diplopia     COPD (chronic obstructive pulmonary disease) (H)     Elevated TSH     CHF (congestive heart failure) (H)     Cardiac pacemaker in situ      Neck pain     Intractable back pain     Health Care Home     Mild cognitive impairment SLUMS = 22/ 30  CPT = 5.0/ 5.5 7-2014     Hypertension goal BP (blood pressure) < 140/90     Depression with anxiety     Tobacco abuse: [age]y/o on 8-12-14 @ 1ppd=50 pk yr hx      Stasis dermatitis     Thoracic disc herniation     Obesity     Hyperlipidemia     Nausea and vomiting     Cholelithiasis with acute on chronic cholecystitis     Slow transit constipation     Gastroesophageal reflux disease without esophagitis     Permanent atrial fibrillation (H)     Basal cell carcinoma of skin     Chronic pain syndrome     Tubular adenoma     Chronic atrial fib, S/P ablation -- on Eliquis     Gastrointestinal hemorrhage, unspecified gastrointestinal hemorrhage type     Iron deficiency     Adverse effect of iron     History of bone marrow biopsy     Herpes zoster without complication     Controlled substance agreement signed     Other chronic pain     Hematoma of groin     Groin hematoma, initial encounter     Pulmonary nodule     Chest pressure     NSTEMI (non-ST elevated myocardial infarction) (H)     (HFpEF) heart failure with preserved ejection fraction (H)     Generalized weakness     Leg fracture, left     CKD (chronic kidney disease) stage 4, GFR 15-29 ml/min (H)     S/p left hip fracture 11/20/18 -- persistent drainage     Anemia, iron deficiency     Peripheral vascular disease (H)     Acute respiratory failure with hypoxia (H)     Past Surgical History:   Procedure Laterality Date     ARTHROPLASTY HIP Left 10/20/2018    Procedure: LEFT HIP HERACLIO-ARTHROPLASTY ;  Surgeon: Ish Fish MD;  Location:  OR     ARTHROSCOPY SHOULDER RT/LT  1999, 2004    Bilateral, right then left     BONE MARROW BIOPSY, BONE SPECIMEN, NEEDLE/TROCAR N/A 8/29/2017    Procedure: BIOPSY BONE MARROW;  BONE MARROW BIOPSY;  Surgeon: Marino Cohen MD;  Location:  GI     C DEXA, BONE DENSITY, AXIAL SKEL       C MAMMOGRAM, SCREENING  1/2009      COLONOSCOPY N/A 10/7/2016    Procedure: COMBINED COLONOSCOPY, SINGLE OR MULTIPLE BIOPSY/POLYPECTOMY BY BIOPSY;  Surgeon: Cassandra Mccord MD;  Location:  GI     COLONOSCOPY N/A 2/15/2019    Procedure: COLONOSCOPY;  Surgeon: Juan Pablo Hayden DO;  Location:  GI     ESOPHAGOSCOPY, GASTROSCOPY, DUODENOSCOPY (EGD), COMBINED N/A 8/10/2017    Procedure: COMBINED ESOPHAGOSCOPY, GASTROSCOPY, DUODENOSCOPY (EGD), BIOPSY SINGLE OR MULTIPLE;  gastroscopy;  Surgeon: Helene Bustamante MD;  Location:  GI     ESOPHAGOSCOPY, GASTROSCOPY, DUODENOSCOPY (EGD), COMBINED N/A 2/15/2019    Procedure: COMBINED ESOPHAGOSCOPY, GASTROSCOPY, DUODENOSCOPY (EGD);  Surgeon: Juan Pablo Hayden DO;  Location:  GI     H ABLATION AV NODE  10/2012     HC COLONOSCOPY THRU STOMA, DIAGNOSTIC  ? 2005     IMPLANT PACEMAKER  10/2012    [place] [id number], [id number]     IRRIGATION AND DEBRIDEMENT LOWER EXTREMITY, COMBINED Left 12/21/2018    Procedure: INCISION AND DRAINAGE OF LEFT HIP WITH EXCISION OF BONE FRAGMENT;  Surgeon: Ish Fish MD;  Location:  OR     JOINT REPLACEMTN, KNEE RT/LT      Joint Replacement knee bilateral     LAPAROSCOPIC CHOLECYSTECTOMY WITH CHOLANGIOGRAMS N/A 12/14/2015    Procedure: LAPAROSCOPIC CHOLECYSTECTOMY WITH CHOLANGIOGRAMS;  Surgeon: Ervin Amos MD;  Location:  OR     LUMPECTOMY BREAST      Left-2004     PHACOEMULSIFICATION CLEAR CORNEA WITH STANDARD INTRAOCULAR LENS IMPLANT Left 7/16/2015    Procedure: PHACOEMULSIFICATION CLEAR CORNEA WITH STANDARD INTRAOCULAR LENS IMPLANT;  Surgeon: Sai Obregon MD;  Location: [facility]     PHACOEMULSIFICATION CLEAR CORNEA WITH TORIC INTRAOCULAR LENS IMPLANT Right 5/9/2017    Procedure: PHACOEMULSIFICATION CLEAR CORNEA WITH TORIC INTRAOCULAR LENS IMPLANT;  RIGHT EYE PHACOEMULSIFICATION CLEAR CORNEA WITH TORIC INTRAOCULAR LENS IMPLANT ;  Surgeon: Duc Richmond MD;  Location: [facility]       Social History     Tobacco Use     Smoking status: Former Smoker      Packs/day: 0.25     Years: 45.00     Pack years: 11.25     Types: Cigarettes     Last attempt to quit: 2018     Years since quittin.0     Smokeless tobacco: Never Used   Substance Use Topics     Alcohol use: No     Alcohol/week: 0.0 oz     Family History   Problem Relation Age of Onset     Hypertension Mother      Diabetes Mother          at 83 yrs     C.A.D. Father          age 70s     Breast Cancer No family hx of      Colon Cancer No family hx of          Current Outpatient Medications   Medication Sig Dispense Refill     ACCU-CHEK SMARTVIEW test strip USE 1 STRIP BY IN VITRO ROUTE 2 TIMES DAILY OR AS DIRECTED 200 strip 0     ACE/ARB/ARNI NOT PRESCRIBED (INTENTIONAL) Please choose reason not prescribed, below       albuterol (PROAIR HFA, PROVENTIL HFA, VENTOLIN HFA) 108 (90 BASE) MCG/ACT inhaler Inhale 2 puffs into the lungs every 4 hours as needed for shortness of breath / dyspnea or wheezing 1 Inhaler 5     apixaban ANTICOAGULANT (ELIQUIS) 2.5 MG tablet Take 1 tablet (2.5 mg) by mouth 2 times daily 60 tablet 1     ASPIRIN NOT PRESCRIBED (INTENTIONAL) Please choose reason not prescribed, below       bisacodyl (DULCOLAX) 5 MG EC tablet Take 10 mg by mouth At Bedtime       blood glucose monitoring (NO BRAND SPECIFIED) meter device kit Accucheck Mariza meter; verified with pharmacist 1 kit 0     denosumab (PROLIA) 60 MG/ML SOLN injection Inject 1 mL (60 mg) Subcutaneous every 6 months 1 mL 1     erythromycin (ROMYCIN) 5 MG/GM ophthalmic ointment Place 0.5 inches Into the left eye 3 times daily 1 Tube 0     fluticasone (FLONASE) 50 MCG/ACT nasal spray Spray 2 sprays into both nostrils daily (Patient taking differently: Spray 2 sprays into both nostrils every evening ) 15.8 mL 11     glipiZIDE (GLUCOTROL) 5 MG tablet Take 1 tablet (5 mg) by mouth every morning (before breakfast) 90 tablet 1     hydrocortisone (ANUSOL-HC) 2.5 % cream Place rectally 2 times daily as needed for hemorrhoids 30 g 1      hydrOXYzine (VISTARIL) 25 MG capsule Take 1 capsule (25 mg) by mouth 2 times daily as needed for itching 180 capsule 1     hypromellose (ARTIFICIAL TEARS) 0.5 % SOLN ophthalmic solution Place 1 drop into both eyes every hour as needed for dry eyes       lidocaine (LIDODERM) 5 % patch Place 1 patch onto the skin daily as needed back       metoprolol tartrate (LOPRESSOR) 25 MG tablet TAKE 1 TABLET BY MOUTH TWICE A DAY 90 tablet 1     pregabalin (LYRICA) 75 MG capsule Take 1 capsule (75 mg) by mouth 2 times daily 10 capsule 1     ranitidine (ZANTAC) 150 MG tablet Take 1 tablet (150 mg) by mouth At Bedtime (Patient taking differently: Take 150 mg by mouth daily ) 90 tablet 3     simvastatin (ZOCOR) 10 MG tablet Take 1 tablet (10 mg) by mouth At Bedtime 90 tablet 3     tiotropium (SPIRIVA) 18 MCG inhaled capsule Inhale 1 capsule (18 mcg) into the lungs daily 90 capsule 1     torsemide (DEMADEX) 20 MG tablet Take 1 tablet (20 mg) by mouth 2 times daily 90 tablet 1     traMADol (ULTRAM) 50 MG tablet Take 1 tablet (50 mg) by mouth every 6 hours as needed for severe pain 12 tablet      triamcinolone (KENALOG) 0.1 % external cream Apply topically 2 times daily 60 g 0     Recent Labs   Lab Test 05/24/19 05/22/19  1355  05/03/19  1716 04/09/19  1414  02/25/19  1320  12/19/18  2053  10/19/18  2156  07/21/18  0800  01/18/18  1129  01/04/18  1101  04/25/17  1445   A1C  --   --   --   --  6.3*  --   --   --   --   --  5.5  --  6.9*   < >  --   --  6.6*   < > 6.4*   LDL  --   --   --   --   --   --  44  --   --   --   --   --   --   --  40  --  36  --  39   HDL  --   --   --   --   --   --  46*  --   --   --   --   --   --   --  71  --  73  --  75   TRIG  --   --   --   --   --   --  65  --   --   --   --   --   --   --  76  --  70  --  91   ALT  --   --   --  14  --   --  10  --  10   < > 255*  --  16   < >  --   --   --    < > 15   CR 1.91* 2.34*   < > 1.84* 1.56*   < > 1.34*   < > 1.76*   < > 1.80*   < > 1.19*   < >  --    < >  1.20*   < > 1.20*   GFRESTIMATED 24* 20*   < > 25* 31*   < > 37*   < > 27*   < > 27*   < > 44*   < >  --    < > 43*   < > 43*   GFRESTBLACK 28* 24*   < > 29* 35*   < > 43*   < > 31*   < > 33*   < > 53*   < >  --    < > 52*   < > 52*   POTASSIUM 4.5 5.4*   < > 4.5 4.5   < > 3.5   < > 4.1   < > 4.4   < > 3.6   < >  --    < >  --    < > 5.0   TSH  --   --   --   --   --   --   --   --  3.62  --   --   --   --   --   --   --   --   --  1.56    < > = values in this interval not displayed.        Reviewed and updated as needed this visit by Provider         Review of Systems   ROS COMP: Constitutional, HEENT, cardiovascular, pulmonary, GI, , musculoskeletal, neuro, skin, endocrine and psych systems are negative, except as otherwise noted.    This document serves as a record of the services and decisions personally performed and made by Neisha Esparza MD. It was created on her behalf by Cassandra Holcomb, a trained medical scribe. The creation of this document is based on the provider's statements to the medical scribe.  Cassandra Holcomb 2:33 PM Aleida 3, 2019        Objective    BP 96/63 (BP Location: Right arm, Cuff Size: Adult Regular)   Pulse 70   Temp 97.7  F (36.5  C) (Tympanic)   Wt 75.8 kg (167 lb)   LMP  (LMP Unknown)   SpO2 97%   BMI 27.79 kg/m    Body mass index is 27.79 kg/m .  Physical Exam   GENERAL: ambulated in wheelchair, looked weak, distressed  EYES: swelling of both eyelids, no sign of infection, blepharitis of left lower eyelid, PERRL and conjunctivae and sclerae normal  RESP: decreased air entry, rales in lower lung base bilaterally  CV: regular rate and rhythm, normal S1 S2, no S3 or S4, no murmur, click or rub, no peripheral edema and peripheral pulses strong  SKIN: rash under her left breast, likely due to intertrigo  PSYCH: mentation appears normal, affect normal/bright    Diagnostic Test Results:  Labs reviewed in Epic  No results found. However, due to the size of the patient record, not  all encounters were searched. Please check Results Review for a complete set of results.        Assessment & Plan     Helene was seen today for hospital f/u.    Diagnoses and all orders for this visit:    Chronic right-sided heart failure (H)  Patient was in TCU from 05/10/1T9 to 05/28/19 following hospital stay  She was at [facility] from 05/03/19 to 05/10/19  Patient is stable now  However, her health is deteriorating  Patient has a very complex medical history  She has multiple co-morbidities  Her life expectancy isn't very long  Educated her about palliative care and hospice care  We discussed serious illness conversation  Due to multiple co-morbities and hospital stays with no improvement  However, patient would like to speak with her niece before taking any decisions  She will think about it and let us know  I advocate for hospice care for this patient   spoke with her after office visit    Chronic obstructive pulmonary disease, unspecified COPD type (H)  See above    Pulmonary hypertension (H)  See above    Type 2 diabetes mellitus with diabetic nephropathy, without long-term current use of insulin (H)  See above  Lab Results   Component Value Date    A1C 6.3 04/09/2019    A1C 5.5 10/19/2018    A1C 6.9 07/21/2018    A1C 6.8 03/22/2018    A1C 6.6 01/04/2018     Blepharitis of left lower eyelid, unspecified type  -     erythromycin (ROMYCIN) 5 MG/GM ophthalmic ointment; Place 0.5 inches Into the left eye 3 times daily  I noticed that both patient's eyes are puffy  Most notably is her left lower eyelid  Prescribed oitnment  Advised her to use it 3 times daily  Explained that she will need to see ophthalmology if she doesn't improve    Anemia, unspecified type  -     CBC with platelets and differential    Medication management  -     Basic metabolic panel    Rash  -     triamcinolone (KENALOG) 0.1 % external cream; Apply topically 2 times daily  Patient has a rash under left  "breast  Consistent with intertrigo  She has been using nystatin powder  However, rash is still persistent  Prescribed triamcinolone cream  Advised using it twice daily  Advised keeping the area dry    External hemorrhoids  -     hydrocortisone (ANUSOL-HC) 2.5 % cream; Place rectally 2 times daily as needed for hemorrhoids  Requested cream  It alleviates her symptoms  Reports that yesterday she had an exacerbation     BMI:   Estimated body mass index is 27.79 kg/m  as calculated from the following:    Height as of 5/28/19: 1.651 m (5' 5\").    Weight as of this encounter: 75.8 kg (167 lb).       Adddendum:    Spoke to her niece on her request   Discussed hospice care  Niece agreed that patient ' s health is going downhill.  She will speak to her and let me know  Discussed hospice referral  Patient Instructions   I refilled your prescriptions  Labs today  Use erythromycin eye ointment on your left eye  Use nystatin powder twice daily on your rash, as needed  Use triamcinolone cream twice daily on your rash  Don't use for more than 14 days consecutively  You and your niece can come together to have discussion about serious illness conversation  Follow up in one month  Seek sooner medical attention if there is any worsening of symptoms or problems    Return in about 1 month (around 7/3/2019) for pneumonia ,CHF.    The total visit time was 40 minutes more than 50% was spent in counseling and coordination of care as discussed above.    The information in this document, created by the medical scribe for me, accurately reflects the services I personally performed and the decisions made by me. I have reviewed and approved this document for accuracy prior to leaving the patient care area.  Aleida 3, 2019 3:09 PM    Neisha Esparza MD  [facility]     "

## 2019-06-05 NOTE — RESULT ENCOUNTER NOTE
Please notify patient by sending following letter with copy of test results      Shakira Narayan,    This is to inform you regarding your test result.    Your potassium is slightly low  Kidney function is slightly worse.  Avoid dehydration but avoid fluid overload also due to heart failure.   Recheck on your next office visit.    Sincerely,      Dr.Nasima Vilma MD,FACP

## 2019-06-05 NOTE — TELEPHONE ENCOUNTER
Patient concerned about Hemoglobin levels and would like results from bloodwork on 06/03/19. Please advise.    Nikos Mendoza CMA on 6/5/2019 at 4:17 PM

## 2019-06-05 NOTE — TELEPHONE ENCOUNTER
Reason for Call:  Home Health Care    josé with [facility] called regarding (reason for call):  [facility] needs orders to hold all homecare services per pt request for this week 6/4/ thru 6/10 . Please advise    Orders are needed for this patient.     PT:     OT:     Skilled Nursing:     Pt Provider:     Phone Number Homecare Nurse can be reached at:     Can we leave a detailed message on this number? YES    Phone number patient can be reached at: Other phone number:      Best Time: any    Call taken on 6/5/2019 at 12:55 PM by Santiago Welsh

## 2019-06-05 NOTE — TELEPHONE ENCOUNTER
Reason for Call:  Request for results:    Name of test or procedure: lab results    Date of test of procedure: 6/3    Location of the test or procedure[facility]    OK to leave the result message on voice mail or with a family member? YES    Phone number Patient can be reached at:  Home number on file [phone] (home)    Additional comments: patient is concerned about her hemoglobin level. She is very worried about going back into the hospital.  She would like a call back to discuss.    Call taken on 6/5/2019 at 4:12 PM by Alysa Carrizales.

## 2019-06-05 NOTE — TELEPHONE ENCOUNTER
Zhang Barrera with pt's home care - gave verbal on the requested hold orders    Shawanda REID RN

## 2019-06-07 NOTE — TELEPHONE ENCOUNTER
Patient Result Comments     Written by Neisha Esparza MD on 6/5/2019  9:49 AM   Shakira Narayan,     This is to inform you regarding your test result.     Your hemoglobin is low   It is 8.4   It has dropped down compared to before   Recheck CBC in 1 week     Sincerely,     Dr.Nasima Vilma MD,FACP      I called pt and relayed Dr. Esparza's comments.    Patient told me she has appt scheduled with Dr. Esparza on 6-12-19 for 60 minutes.

## 2019-06-12 NOTE — PATIENT INSTRUCTIONS
We discussed about advanced care planning today with you and your niece    Labs today  Lab only appointment in two weeks    Follow up in one month  Seek sooner medical attention if there is any worsening of symptoms or problems.

## 2019-06-12 NOTE — PROGRESS NOTES
Subjective     Helene Gibbs is a 84 year old female who presents to clinic today for the following health issues:    HPI       Patient is in to see Dr Esparza for serious health concern    Patient came to have discussion regarding advanced care planning.  Patient lives by herself in a place where she has help available  Her niece whose name is Enedelia was also present    Overall patient is getting generalized weakness  Her mobility is limited  She is having episodes of intermittent confusion  She cannot travel in regular car  She usually uses special van  She came today in a wheelchair and used a special van for transportation.  Enedelia is actively involved in decision-making and in her care  Patient wanted her to be present so she came  She is paying out of pocket for services she is receiving.  Her niece is concerned about that patient may not be taking medication as she is supposed to, due to intermittent episodes of confusion  Sometimes patient experience since hallucination and delusions at night  She needs help with bathing and activities of daily living  She uses walker to go to the bathroom  She is able to walk short distances with the help of walker    Patient Active Problem List   Diagnosis     Type 2 diabetes mellitus with diabetic nephropathy (H)     Anemia     CKD (chronic kidney disease) stage 3, GFR 30-59 ml/min (H)     Chronic low back pain     Pleural effusion, left     Adjustment reaction with anxiety and depression     c diff colitis 11-12-12     Pulmonary hypertension (H)     Melanosis of colon     Diplopia     COPD (chronic obstructive pulmonary disease) (H)     Elevated TSH     CHF (congestive heart failure) (H)     Cardiac pacemaker in situ     Neck pain     Intractable back pain     Health Care Home     Mild cognitive impairment SLUMS = 22/ 30  CPT = 5.0/ 5.5 7-2014     Hypertension goal BP (blood pressure) < 140/90     Depression with anxiety     Tobacco abuse: [age]y/o on 8-12-14 @ 1ppd=50 pk  yr hx      Stasis dermatitis     Thoracic disc herniation     Obesity     Hyperlipidemia     Nausea and vomiting     Cholelithiasis with acute on chronic cholecystitis     Slow transit constipation     Gastroesophageal reflux disease without esophagitis     Permanent atrial fibrillation (H)     Basal cell carcinoma of skin     Chronic pain syndrome     Tubular adenoma     Chronic atrial fib, S/P ablation -- on Eliquis     Gastrointestinal hemorrhage, unspecified gastrointestinal hemorrhage type     Iron deficiency     Adverse effect of iron     History of bone marrow biopsy     Herpes zoster without complication     Controlled substance agreement signed     Other chronic pain     Hematoma of groin     Groin hematoma, initial encounter     Pulmonary nodule     Chest pressure     NSTEMI (non-ST elevated myocardial infarction) (H)     (HFpEF) heart failure with preserved ejection fraction (H)     Generalized weakness     Leg fracture, left     CKD (chronic kidney disease) stage 4, GFR 15-29 ml/min (H)     S/p left hip fracture 11/20/18 -- persistent drainage     Anemia, iron deficiency     Peripheral vascular disease (H)     Acute respiratory failure with hypoxia (H)     Past Surgical History:   Procedure Laterality Date     ARTHROPLASTY HIP Left 10/20/2018    Procedure: LEFT HIP HERACLIO-ARTHROPLASTY ;  Surgeon: Ish Fish MD;  Location:  OR     ARTHROSCOPY SHOULDER RT/LT  1999, 2004    Bilateral, right then left     BONE MARROW BIOPSY, BONE SPECIMEN, NEEDLE/TROCAR N/A 8/29/2017    Procedure: BIOPSY BONE MARROW;  BONE MARROW BIOPSY;  Surgeon: Marino Cohen MD;  Location:  GI     C DEXA, BONE DENSITY, AXIAL SKEL       C MAMMOGRAM, SCREENING  1/2009     COLONOSCOPY N/A 10/7/2016    Procedure: COMBINED COLONOSCOPY, SINGLE OR MULTIPLE BIOPSY/POLYPECTOMY BY BIOPSY;  Surgeon: Cassandra Mccord MD;  Location:  GI     COLONOSCOPY N/A 2/15/2019    Procedure: COLONOSCOPY;  Surgeon: Juan Pablo Hayden DO;   Location:  GI     ESOPHAGOSCOPY, GASTROSCOPY, DUODENOSCOPY (EGD), COMBINED N/A 8/10/2017    Procedure: COMBINED ESOPHAGOSCOPY, GASTROSCOPY, DUODENOSCOPY (EGD), BIOPSY SINGLE OR MULTIPLE;  gastroscopy;  Surgeon: Helene Bustamante MD;  Location:  GI     ESOPHAGOSCOPY, GASTROSCOPY, DUODENOSCOPY (EGD), COMBINED N/A 2/15/2019    Procedure: COMBINED ESOPHAGOSCOPY, GASTROSCOPY, DUODENOSCOPY (EGD);  Surgeon: Juan Pablo Hayden, DO;  Location:  GI     H ABLATION AV NODE  10/2012     HC COLONOSCOPY THRU STOMA, DIAGNOSTIC  ?      IMPLANT PACEMAKER  10/2012    [place] [id number], [id number]     IRRIGATION AND DEBRIDEMENT LOWER EXTREMITY, COMBINED Left 2018    Procedure: INCISION AND DRAINAGE OF LEFT HIP WITH EXCISION OF BONE FRAGMENT;  Surgeon: Ish Fish MD;  Location:  OR     JOINT REPLACEMTN, KNEE RT/LT      Joint Replacement knee bilateral     LAPAROSCOPIC CHOLECYSTECTOMY WITH CHOLANGIOGRAMS N/A 2015    Procedure: LAPAROSCOPIC CHOLECYSTECTOMY WITH CHOLANGIOGRAMS;  Surgeon: Ervin Amos MD;  Location:  OR     LUMPECTOMY BREAST      Left-     PHACOEMULSIFICATION CLEAR CORNEA WITH STANDARD INTRAOCULAR LENS IMPLANT Left 2015    Procedure: PHACOEMULSIFICATION CLEAR CORNEA WITH STANDARD INTRAOCULAR LENS IMPLANT;  Surgeon: Sai Obregon MD;  Location: [facility]     PHACOEMULSIFICATION CLEAR CORNEA WITH TORIC INTRAOCULAR LENS IMPLANT Right 2017    Procedure: PHACOEMULSIFICATION CLEAR CORNEA WITH TORIC INTRAOCULAR LENS IMPLANT;  RIGHT EYE PHACOEMULSIFICATION CLEAR CORNEA WITH TORIC INTRAOCULAR LENS IMPLANT ;  Surgeon: Duc Richmond MD;  Location: [facility]       Social History     Tobacco Use     Smoking status: Former Smoker     Packs/day: 0.25     Years: 45.00     Pack years: 11.25     Types: Cigarettes     Last attempt to quit: 2018     Years since quittin.0     Smokeless tobacco: Never Used   Substance Use Topics     Alcohol use: No     Alcohol/week: 0.0 oz     Family  History   Problem Relation Age of Onset     Hypertension Mother      Diabetes Mother          at 83 yrs     C.A.D. Father          age 70s     Breast Cancer No family hx of      Colon Cancer No family hx of              Reviewed and updated as needed this visit by Provider         Review of Systems   ROS COMP: Constitutional, HEENT, cardiovascular, pulmonary, GI, , musculoskeletal, neuro, skin, endocrine and psych systems are negative, except as otherwise noted.      Objective    /63 (BP Location: Right arm, Patient Position: Sitting, Cuff Size: Adult Regular)   Pulse 74   Resp 14   LMP  (LMP Unknown)   SpO2 (!) 87%   There is no height or weight on file to calculate BMI.  Physical Exam   Patient was sitting in a wheelchair  She was puffy  She had leg edema  She was not in any respiratory distress  She was alert awake oriented today    Labs were reviewed and discussed with the patient        Assessment & Plan   Helene was seen today for recheck.    Diagnoses and all orders for this visit:    Advanced directives, counseling/discussion  And has multiple comorbidities including congestive heart failure, COPD, pulmonary hypertension, GI bleeding and anemia chronic kidney disease.  In the past several months she has been in and out of the hospital many times  I discussed with her about hospice care.  At this point patient wants to wait  I explained her that overall her generalized condition is slowly deteriorating  And I am concerned about as she lives by herself  She is having intermittent episodes of confusion  I discussed with her about moving to the long-term care facility where she has  supervision  Her niece was in agreement  Patient wants to think about it  At this point she wants to stay in her own home  She will try to get more help  We referred her to home care because she is not good setting of her medication and due to change in her mental status her niece is concerned about her  "medication her medications .  She would feel more comfortable if nursing staff can set up her planner.  Home care referral was done  Answered her questions  She is DO NOT RESUSCITATE DO NOT INTUBATE  She does not want hospice care at this point  She is  going to think about long-term care facility  Our  provided the information about the long-term care facilities  If patient says okay then I think she is a good candidate for getting involved in hospice care  She has congestive heart failure and other comorbidities  In my opinion her life expectancy is short.    Anemia, unspecified type  -     CBC with platelets  -     HOME CARE NURSING REFERRAL  -     CBC with platelets; Future  Her hemoglobin slightly better than the last time  We will recheck in 2 weeks    Chronic right-sided heart failure (H)  -     HOME CARE NURSING REFERRAL  -     CARE COORDINATION REFERRAL  Patient is on diuretics    Chronic obstructive pulmonary disease, unspecified COPD type (H)  -     HOME CARE NURSING REFERRAL    Pulmonary hypertension (H)  -     HOME CARE NURSING REFERRAL    Type 2 diabetes mellitus with diabetic nephropathy, without long-term current use of insulin (H)  -     HOME CARE NURSING REFERRAL  Lab Results   Component Value Date    A1C 6.3 04/09/2019    A1C 5.5 10/19/2018    A1C 6.9 07/21/2018    A1C 6.8 03/22/2018    A1C 6.6 01/04/2018       CKD (chronic kidney disease) stage 4, GFR 15-29 ml/min (H)  -     Basic metabolic panel  -     HOME CARE NURSING REFERRAL  -     Basic metabolic panel; Future          BMI:   Estimated body mass index is 27.79 kg/m  as calculated from the following:    Height as of 5/28/19: 1.651 m (5' 5\").    Weight as of 6/3/19: 75.8 kg (167 lb).     Answered all her questions    The total visit time was 60 minutes more  than 50% was spent in counseling and coordination of care as discussed above.    Disclaimer: This note consists of symbols derived from keyboarding, dictation and/or voice " recognition software. As a result, there may be errors in the script that have gone undetected. Please consider this when interpreting information found in this chart.      Patient Instructions   We discussed about advanced care planning today with you and your niece    Labs today  Lab only appointment in two weeks    Follow up in one month  Seek sooner medical attention if there is any worsening of symptoms or problems.      No follow-ups on file.    Neisha Esparza MD  [facility]

## 2019-06-12 NOTE — RESULT ENCOUNTER NOTE
Shakira Narayan,    This is to inform you regarding your test result.    Your white count is normal and hemoglobin is 9.1  So there is slight improvement compared to last time  Will recheck in 2 weeks    Sincerely,      Dr.Nasima Vilma MD,FACP

## 2019-06-12 NOTE — PROGRESS NOTES
Clinic Care Coordination Contact  OUTREACH    Referral Information: PCP  Primary Diagnosis: Psychosocial    Chief Complaint   Patient presents with     Clinic Care Coordination - Face To Face     Resources for long term care      Universal Utilization: RN Care Coordinator met with patient and her niece Enedelia at the clinic, per Dr. Esparza's request, after outpatient visit with PCP. Writer engaged in AIDET communication with Duncan during encounter.    Utilization    Last refreshed: 6/11/2019  4:40 PM:  Hospital Admissions 5           Last refreshed: 6/11/2019  4:40 PM:  ED Visits 4           Last refreshed: 6/11/2019  4:40 PM:  No Show Count (past year) 2              Current as of: 6/11/2019  4:40 PM            Clinical Concerns:  Current Medical Concerns: Patient's health is declining and PCP is concerned she needs more assistance than what is currently provided by Assisting Living Facility and Home Care Services.  Patient is also planning to have a Watchman device placed on 7/16/19, for permanent atrial fibrillation, so that she can go off of anticoagulation medicines. Those are contraindicated with her history of colonic angio atelectasis and GI bleedings.          Current Behavioral Concerns: Patient is having difficulty, as expected, accepting the need for transition.    Education Provided to patient: long term care facilities and senior citizen/medical transportation resources.    Pain  Pain (GOAL):: No  Health Maintenance Reviewed: Up to date  Clinical Pathway: none    Medication Management:  No questions or concerns.     Functional Status:  Dependent ADLs:: Ambulation-walker, Wheelchair-with assist  Dependent IADLs:: Cleaning, Cooking, Laundry, Meal Preparation, Medication Management, Transportation, Shopping  Bed or wheelchair confined:: No  Mobility Status: Dependent/Assisted by Another  Fallen 2 or more times in the past year?: No  Any fall with injury in the past year?: No    Living  Situation:  Current living arrangement:: I live in assisted living, I live alone  Type of residence:: Assisted living    Diet/Exercise/Sleep:  Diet:: No added salt  Inadequate nutrition (GOAL):: No  Food Insecurity: No  Tube Feeding: No  Exercise:: Currently not exercising  Inadequate activity/exercise (GOAL):: No  Significant changes in sleep pattern (GOAL): No    Transportation:  Transportation concerns (GOAL):: No  Transportation means:: Family, private medical transportation ([employer])     Psychosocial:  Pentecostalism or spiritual beliefs that impact treatment:: No  Mental health DX:: Yes  Mental health DX how managed:: Medication, BHC Services at Primary Care  Mental health management concern (GOAL):: No  Informal Support system:: Family     Financial/Insurance: MEDICARE/BCBS [employer] EMPL[employer]EE PROGRAM  Financial/Insurance concerns (GOAL):: No       Resources:  List of home care services:: [facility];   Community Resources: Transitional Care, Skilled Nursing Facility  Supplies used at home:: None  Equipment Currently Used at Home: walker, rolling, wheelchair, manual    Advance Care Plan/Directive  Advanced Care Plans/Directives on file:: Yes  Type Advanced Care Plans/Directives: Advanced Directive - On File, POLST  Advanced Care Plan/Directive Status: Not Applicable    Interventions:  Dr. Esparza told patient her hemoglobin results were great today and next one could be done by home care RN. Patient wants to continue to have her blood draws at the [facility]. RN CC provided patient and her niece with more options of transportation resources to use as needed.  RN Care Coordinator, Duncan talked about her current situation living in an [facility] and the home care services by [facility]. Patient said she was not getting any services from [facility] after her discharge from [facility] on [facility]. But on her last visit to the clinic, she told this writer that [facility] was going to [facility]. Discharge plan from  "[facility] also mentions \"RN and HHA through [facility]\". Enedelia said she is going to the [facility] with patient today to see exactly what services she is getting from them and from home care due to all cost involved.    Referrals Placed: long term care/hospice     Goals:   Goals        General    1. Other     Notes - Note created  6/5/2019 12:02 PM by Cally Campbell RN    Goal Statement: Patient will transition from Home Care Services to Palliative Home Care or Hospice Care as soon as she is able to.  Measure of Success: Within 1-3 months, patient will transition from current HC services to another kind of service in which she has extra layers of support due to her chronic diseases, such as Palliative Home Care or Hospice Care.  Supportive Steps to Achieve:   1. Primary Care Provider, Dr. Esparza, has discussed topic with patient.   2. Dr. Esparza will call patient's niece, per patient's request, to talk about the transition of care topic.  3. RN Care Coordinator will support and assist patient and her niece with resources, when it is decided the kind of transition she wants.  Barriers: Patient has been having Home Care services through [facility] and would like to continue with that provider.  Strengths:     Patient has already established a good relationship with [facility].     Patient is open to discuss a transition process.  Date to Achieve By: 9/3/19  Patient expressed understanding of goal: Yes        Psychosocial (pt-stated)     Notes - Note created  6/12/2019  1:00 PM by Cally Campbell RN    Goal Statement: Patient would like assistance with the selection of a long term care facility (for transition in/after July 2019).  Measure of Success: Within a month, patient will have a list of long term care facilities to visit and choose from.  Supportive Steps to Achieve:     RN Care Coordinator will provide the resources to both patient and her niece Enedelia, per patient's request, by mail.    Patient " will look into the facilities she might be interested at, with Enedelia's assistance.    RN/SW Care Coordinator will provide support to patient throughout the process, as needed.  Barriers: Patient wants to wait to do the transition after her Watchman procedure, scheduled to 07/ Strengths: Patient has great family support from her niece Enedelia.  Date to Achieve By: 09/12/19  Patient expressed understanding of goal: Yes.          As of today's date 6/12/2019 goal is met at 51 - 75%.   Goal Status: Discontinued and substituted by new goal,transition to a long term care facility.    Patient/Caregiver understanding: Yes    Outreach Frequency: monthly  Future Appointments              In 2 weeks [facility] PHYSICIANS HEART AT [facility] PSA CLIN    In 2 weeks Angelica Grady APRN CNP [facility] Heart [facility] CLIN    In 3 weeks Neisha Esparza MD [facility], CS    In 1 month [facility] Radiology, [facility]        Plan: As stated above in Goal.    Cally Campbell RN Care Coordinator  [facility]  Phone:  [phone] (Mondays, Wednesdays & Fridays)  Phone:  [phone] (Tuesdays & Thursdays)  Email: [email or web address]

## 2019-06-13 NOTE — PROGRESS NOTES
[facility] now requests orders and shares plan of care/discharge summaries for some patients through [employer].  Please REPLY TO THIS MESSAGE OR ROUTE BACK TO THE AUTHOR in order to give authorization for orders when needed.  This is considered a verbal order, you will still receive a faxed copy of orders for signature.  Thank you for your assistance in improving collaboration for our patients.    ORDER    Skilled nursing 2 week 3, 1 week 3, 3 PRN for disease and symptom management, medication management.  PT to evaluate and treat to include strengthening, ambulation, transfers, balance, and HEP.   OT to evaluate and treat to include HSE, adls, UE strengthening.  SW assessment for available resources and long term care planning.   HHA 1 week 5 for bathing and personal hygiene cares.     Ruba Dominguez RN Admission Clinician  [facility]  [phone]

## 2019-06-14 NOTE — RESULT ENCOUNTER NOTE
Shakira Narayan,    This is to inform you regarding your test result.    Your potassium is slightly low due to diuretics  Eat food rich in potassium  Kidney function shows slight improvement.  Recheck in 2 weeks.    Sincerely,      Dr.Nasima Vilma MD,FACP

## 2019-06-19 NOTE — PROGRESS NOTES
[facility] now requests orders and shares plan of care/discharge summaries for some patients through [employer].  Please REPLY TO THIS MESSAGE OR ROUTE BACK TO THE AUTHOR in order to give authorization for orders when needed.  This is considered a verbal order, you will still receive a faxed copy of orders for signature.  Thank you for your assistance in improving collaboration for our patients.    ORDER    Occupational Therapy 3/m/2 for increasing endurance for ADLs, strengthening UE, adaptations for safety and conserving energy.

## 2019-06-21 PROBLEM — J18.9 PNEUMONIA, UNSPECIFIED ORGANISM: Status: ACTIVE | Noted: 2019-01-01

## 2019-06-21 NOTE — PROGRESS NOTES
Hospitalist brief Note:    Patient admitted overnight by Dr. Montes due weakness and dyspnea. Refer to H & P for detail. She is found to have suspected ongoing LLL pneumonia and also anemia. She is currently on antibiotics. S/p pRBC transfusion. Visited with patient. She reports her breathing is currently stable and LE edema has recently improved and does not thing worsening. Given her recurrent hospitalization and ongoing generalized weakness, she would like to meet with palliative care and states her she wants her niece to be involved as well.      1. Suspected LLB pneumonia  2. Right sided pleural effusion  3. Acute on chronic anemia with know hx of colonic AVMs on Eliquis  4. Chronic atrial fibrillation  5. Chronic diastolic HF  6. COPD  5. Stage 4 CKD  6. Hyokalemia:    Plan:  - continue with cefepime. Will add Zithromax for atypical coverage  - CT chest pending  - continue to hold Eliquis(last dose evening of 6/20)  - consider right thoracentesis if breathing worse  - follow hgb  - palliative care consult per patient request  - attempt calling Enedelia Waters, x 2, goes to  and did not leave a message  Plan otherwise as stated in Dr. Montes's H&P    Wale Jeong MD  Hositalist

## 2019-06-21 NOTE — CONSULTS
"[facility]    Palliative Care Consultation     Helene Gibbs  MRN# [id number]  Date of Admission:  6/21/2019  Date of Service (when I saw the patient): 06/21/19  Reason for consult: Consulted by Dr. Jeong for Goals of care  Patient and family support    Assessment & Plan   Helene Gibbs is a 84 year old female with PMH significant for CAD; chronic diastolic CHF and Tricuspid regurgitation; chronic respiratory failure due to COPD, kyphosis and restrictive lung disease, and severe pulmonary hypertension; chronic atrial fibrillation on Eliquis; recurrent lower GI bleeding and chronic anemia; CKD Stage 4; and Type 2 Diabetes mellitus; and GERD; who presents on 6/21 with progressive weakness and dyspnea and is found to have suspected ongoing or recurrent left lower lobe pneumonia as well as right sided pleural effusion, and acute on chronic anemia.     Discussed our potential roles for symptom management, support/coping, and decisional support (aka goals of care).     Goals of care discussions:   I met with Tenisha and her niece, Enedelia, by phone as well as the , Holly Dee. This is Tenisha's 4th hospitalization since December, 2018, with most recent hospitalization 5/3-5/10/19 for acute diastolic heart failure exacerbation.  Tenisha initially started our conversation by telling me that she is tired of getting admitted to the hospital and her primary care provider is working with her to prevent all these hospitalizations. Tenisha believes that with more help wherever she was living, she could prevent re-hospitalization. She and Enedelia met with her PCP, Dr. Esparza, on 6/3/19 and Dr. Esparza recommended hospice care but at that time, but Tenisha tells me that she \"shut down\" because she is not ready for hospice yet.  Tenisha watched her sisters live to be very old in nursing homes and she feels that her sister's fought to live that long and Tenisha feels like hospice is giving up and she is not ready to " give up.  Tenisha stated that she wants to continue to fight to live and although she does not want to have recurrent hospitalizations and she believes that with 24-hour care that is provided in a nursing home, she will be able to prevent rehospitalization.     I explained to Tenisha and Enedelia that with all of Tenisha's medical problems, I was worried that we were not going to be able to prevent rehospitalization unless the focus of our care is on helping Tenisha feel as good as possible for whatever time she has left.  I explained that with all her medical problems, my worry was that there was not more we could do to help her live longer outside of the hospital and that is why I thought hospice care would help her achieve her goal of not returning to the hospital and helping her feel as good as possible for whatever time she had left.  Tenisha is not ready for hospice care and seemed annoyed that I was talking about this after having talked about this exact issue with her primary care provider.  Tenisha wants is what I described above with 24-hour care in a nursing home.    - continue all current cares  - DNR / DNI  - When patient ready for discharge, she wants to go to a nursing home with 24 hr care. She states she is not ready for hospice yet and is hoping to be able to live better and stay out of the hospital with more care that is provided in a nursing home as opposed to the care that she has in an assisted living facility.     Symptom Recommendations:   No symptom recs at this time.     Support/Coping    -Will involve Palliative     Decisional Support, Goals of Care, Counseling & Coordination  Decisional Capacity Intact?  -yes  Health Care Directive on File?  -yes  Code Status/Resuscitation Preferences?  -DNR/DNI      Thank you for involving us in the care of this patient and family. We will continue to follow at this time. Please do not hesitate to contact me with questions or concerns or the on-call provider for  our team if evening or weekend.    [facility]le [facility]  Palliative Care Physician  Pager [phone]      Attestation:  Total time on the floor involved in the patient's care: 75 minutes  Total time spent in counseling/care coordination: >50%    Chief Complaint   Goals of care    History is obtained from the patient, her niece, Enedelia, by phone and extensive chart review.     Past Medical History    I have reviewed this patient's medical history and updated it with pertinent information if needed.   Past Medical History:   Diagnosis Date     Anemia      Ankle arthritis      Arthritis      Back pain      Bell's palsy 9/08    left     Breast CA (H) 2004-    left lumpectomy, radiation, -recurrence     Breast cancer (H) 2004    Left breast lumpectomy w LN disection, and radiation therapy     CKD (chronic kidney disease)     stage 3 baseline Cr 1.3     Colon polyps     colonoscopy-9/12-next due in 9/13     Congestive heart failure (H)      COPD (chronic obstructive pulmonary disease) (H)      Coronary artery disease      DM (diabetes mellitus) (H)      GERD (gastroesophageal reflux disease)      Hyperlipidemia      Hypertension      Lumbar spinal stenosis     use cane     Nicotine dependence      Obesity      Osteoporosis      Other chronic pain      Pacemaker      Permanent atrial fibrillation (H) 8/2008    S/P AV node ablation and pacemaker 10-25-12       Pleural effusion      Pulmonary hypertension (H)      PVD (peripheral vascular disease) (H)      Rectal stenosis      Tobacco abuse     current     Tricuspid regurgitation        Past Surgical History   I have reviewed this patient's surgical history and updated it with pertinent information if needed.  Past Surgical History:   Procedure Laterality Date     ARTHROPLASTY HIP Left 10/20/2018    Procedure: LEFT HIP HERACLIO-ARTHROPLASTY ;  Surgeon: Ish Fish MD;  Location: [facility]     ARTHROSCOPY SHOULDER RT/LT  1999, 2004    Bilateral, right then left     BONE MARROW  BIOPSY, BONE SPECIMEN, NEEDLE/TROCAR N/A 8/29/2017    Procedure: BIOPSY BONE MARROW;  BONE MARROW BIOPSY;  Surgeon: Marino Cohen MD;  Location:  GI     C DEXA, BONE DENSITY, AXIAL SKEL       C MAMMOGRAM, SCREENING  1/2009     COLONOSCOPY N/A 10/7/2016    Procedure: COMBINED COLONOSCOPY, SINGLE OR MULTIPLE BIOPSY/POLYPECTOMY BY BIOPSY;  Surgeon: Cassandra Mccord MD;  Location:  GI     COLONOSCOPY N/A 2/15/2019    Procedure: COLONOSCOPY;  Surgeon: Juan Pablo Hayden DO;  Location:  GI     ESOPHAGOSCOPY, GASTROSCOPY, DUODENOSCOPY (EGD), COMBINED N/A 8/10/2017    Procedure: COMBINED ESOPHAGOSCOPY, GASTROSCOPY, DUODENOSCOPY (EGD), BIOPSY SINGLE OR MULTIPLE;  gastroscopy;  Surgeon: Helene Bustamante MD;  Location:  GI     ESOPHAGOSCOPY, GASTROSCOPY, DUODENOSCOPY (EGD), COMBINED N/A 2/15/2019    Procedure: COMBINED ESOPHAGOSCOPY, GASTROSCOPY, DUODENOSCOPY (EGD);  Surgeon: Juan Pablo Hayden DO;  Location:  GI     H ABLATION AV NODE  10/2012     HC COLONOSCOPY THRU STOMA, DIAGNOSTIC  ? 2005     IMPLANT PACEMAKER  10/2012    [place] [id number], [id number]     IRRIGATION AND DEBRIDEMENT LOWER EXTREMITY, COMBINED Left 12/21/2018    Procedure: INCISION AND DRAINAGE OF LEFT HIP WITH EXCISION OF BONE FRAGMENT;  Surgeon: Ish Fish MD;  Location:  OR     JOINT REPLACEMTN, KNEE RT/LT      Joint Replacement knee bilateral     LAPAROSCOPIC CHOLECYSTECTOMY WITH CHOLANGIOGRAMS N/A 12/14/2015    Procedure: LAPAROSCOPIC CHOLECYSTECTOMY WITH CHOLANGIOGRAMS;  Surgeon: Ervin Amos MD;  Location:  OR     LUMPECTOMY BREAST      Left-2004     PHACOEMULSIFICATION CLEAR CORNEA WITH STANDARD INTRAOCULAR LENS IMPLANT Left 7/16/2015    Procedure: PHACOEMULSIFICATION CLEAR CORNEA WITH STANDARD INTRAOCULAR LENS IMPLANT;  Surgeon: Sai Obregon MD;  Location:  [facility]     PHACOEMULSIFICATION CLEAR CORNEA WITH TORIC INTRAOCULAR LENS IMPLANT Right 5/9/2017    Procedure: PHACOEMULSIFICATION CLEAR CORNEA WITH  TORIC INTRAOCULAR LENS IMPLANT;  RIGHT EYE PHACOEMULSIFICATION CLEAR CORNEA WITH TORIC INTRAOCULAR LENS IMPLANT ;  Surgeon: Duc Richmond MD;  Location: [facility]       Social History   Living situation: Tenisha currently lives in assisted living and has been living at this facility for about a year now.    Family system: Tenisha had 2 sisters were 20 and 22 years older than she was and they  in nursing homes at the age of 101 and 98.  Tenisha's family now is only her niece, nEedelia, who is extremely supportive of her.    Self-identified support system: Enedelia, her niece.    Employment/education: Tenisha worked for [employer] in [facility] and then worked in [place] for 30 years before moving back here to [place] where she grew up approximately 10 years ago.    Activities/interests: Tenisha loves her independence and let us talking about the work that she did when she was younger.  Tenisha is hoping to be able to still have independence yet feel she needs more care and attention that cannot be provided in an assisted living facility but Tenisha is hoping to be able to find that level of care in a nursing home.    Use of community resources: lives in assisted living facility.     [employer] affiliation / involvement in nataly community: she greatly appreciated having  support during our visit.       Family History   I have reviewed this patient's family history and updated it with pertinent information if needed.   Family History   Problem Relation Age of Onset     Hypertension Mother      Diabetes Mother          at 83 yrs     C.A.D. Father          age 70s     Breast Cancer No family hx of      Colon Cancer No family hx of        Allergies   Allergies   Allergen Reactions     Ambien [Zolpidem Tartrate] Visual Disturbance     Hallucinations and fell out of bed at night.     Penicillins Hives     Definity      Caused a syncopal episode.     Sulfa Drugs Itching     Cymbalta Rash     Fluconazole Rash      Tolerates miconazole suppositories       Medications   I have reviewed all medications    Review of Systems   The comprehensive review of systems is negative other than noted here and in the assessment/plan.    Palliative Symptom Review (0=no symptom/no concern, 1=mild, 2=moderate, 3=severe):  Pain: 0  Fatigue: 1  Nausea: 0  Depressive Symptoms: 0  Anxiety: 0  Drowsiness: 0  Poor Appetite: 1-2  Shortness of Breath: 0 while lying in bed and talking    Physical Exam   Temp: 98.6  F (37  C) Temp src: Axillary BP: 99/48 Pulse: 72 Heart Rate: 70 Resp: 18 SpO2: 97 % O2 Device: Nasal cannula Oxygen Delivery: 2 LPM  Vitals:    06/21/19 0137   Weight: 68.9 kg (152 lb)     General: Alert, well-groomed, lying in bed, appears slightly older than stated age, in no acute distress  Eyes: EOMI, sclera clear  HEENT: NC/AT; mucous membranes moist  Lungs: No increased work of breathing, speaking full sentences   Neuro: A&O x 3; CN II-XII grossly intact;   Neuropsych: Alert, good eye contact, engaged, affect full, pleasant, sensorium intact    Data   Results for orders placed or performed during the hospital encounter of 06/21/19 (from the past 24 hour(s))   EKG 12-lead, tracing only   Result Value Ref Range    Interpretation ECG Click View Image link to view waveform and result    CBC with platelets differential   Result Value Ref Range    WBC 10.4 4.0 - 11.0 10e9/L    RBC Count 3.50 (L) 3.8 - 5.2 10e12/L    Hemoglobin 7.8 (L) 11.7 - 15.7 g/dL    Hematocrit 27.7 (L) 35.0 - 47.0 %    MCV 79 78 - 100 fl    MCH 22.3 (L) 26.5 - 33.0 pg    MCHC 28.2 (L) 31.5 - 36.5 g/dL    RDW 18.1 (H) 10.0 - 15.0 %    Platelet Count 152 150 - 450 10e9/L    Diff Method Automated Method     % Neutrophils 83.8 %    % Lymphocytes 4.0 %    % Monocytes 11.7 %    % Eosinophils 0.1 %    % Basophils 0.2 %    % Immature Granulocytes 0.2 %    Nucleated RBCs Unable to Calculate 0 /100    Absolute Neutrophil 8.7 (H) 1.6 - 8.3 10e9/L    Absolute Lymphocytes 0.4 (L)  0.8 - 5.3 10e9/L    Absolute Monocytes 1.2 0.0 - 1.3 10e9/L    Absolute Eosinophils 0.0 0.0 - 0.7 10e9/L    Absolute Basophils 0.0 0.0 - 0.2 10e9/L    Abs Immature Granulocytes 0.0 0 - 0.4 10e9/L    Absolute Nucleated RBC Unable to Calculate    Comprehensive metabolic panel   Result Value Ref Range    Sodium Canceled, Test credited, specimen discarded 133 - 144 mmol/L    Potassium Canceled, Test credited, specimen discarded 3.4 - 5.3 mmol/L    Chloride Canceled, Test credited, specimen discarded 94 - 109 mmol/L    Carbon Dioxide Canceled, Test credited, specimen discarded 20 - 32 mmol/L    Anion Gap Canceled, Test credited, specimen discarded 6 - 17 mmol/L    Glucose Canceled, Test credited, specimen discarded 70 - 99 mg/dL    Urea Nitrogen Canceled, Test credited, specimen discarded 7 - 30 mg/dL    Creatinine Canceled, Test credited, specimen discarded 0.52 - 1.04 mg/dL    GFR Estimate Canceled, Test credited, specimen discarded >60 mL/min/[1.73_m2]    GFR Estimate If Black Canceled, Test credited, specimen discarded >60 mL/min/[1.73_m2]    Calcium Canceled, Test credited, specimen discarded 8.5 - 10.1 mg/dL    Bilirubin Total Canceled, Test credited, specimen discarded 0.2 - 1.3 mg/dL    Albumin Canceled, Test credited, specimen discarded 3.4 - 5.0 g/dL    Protein Total Canceled, Test credited, specimen discarded 6.8 - 8.8 g/dL    Alkaline Phosphatase Canceled, Test credited, specimen discarded 40 - 150 U/L    ALT Canceled, Test credited, specimen discarded 0 - 50 U/L    AST Canceled, Test credited, specimen discarded 0 - 45 U/L   UA with Microscopic   Result Value Ref Range    Color Urine Yellow     Appearance Urine Clear     Glucose Urine Negative NEG^Negative mg/dL    Bilirubin Urine Negative NEG^Negative    Ketones Urine Negative NEG^Negative mg/dL    Specific Gravity Urine 1.011 1.003 - 1.035    Blood Urine Negative NEG^Negative    pH Urine 6.5 5.0 - 7.0 pH    Protein Albumin Urine Negative NEG^Negative mg/dL     Urobilinogen mg/dL Normal 0.0 - 2.0 mg/dL    Nitrite Urine Negative NEG^Negative    Leukocyte Esterase Urine Negative NEG^Negative    Source Catheterized Urine     WBC Urine 1 0 - 5 /HPF    RBC Urine <1 0 - 2 /HPF    Squamous Epithelial /HPF Urine 2 (H) 0 - 1 /HPF    Mucous Urine Present (A) NEG^Negative /LPF   Lactic acid   Result Value Ref Range    Lactic Acid 1.7 0.7 - 2.0 mmol/L   Comprehensive metabolic panel   Result Value Ref Range    Sodium 144 133 - 144 mmol/L    Potassium 3.3 (L) 3.4 - 5.3 mmol/L    Chloride 101 94 - 109 mmol/L    Carbon Dioxide 37 (H) 20 - 32 mmol/L    Anion Gap 6 3 - 14 mmol/L    Glucose 143 (H) 70 - 99 mg/dL    Urea Nitrogen 57 (H) 7 - 30 mg/dL    Creatinine 1.92 (H) 0.52 - 1.04 mg/dL    GFR Estimate 24 (L) >60 mL/min/[1.73_m2]    GFR Estimate If Black 28 (L) >60 mL/min/[1.73_m2]    Calcium 8.6 8.5 - 10.1 mg/dL    Bilirubin Total 1.5 (H) 0.2 - 1.3 mg/dL    Albumin 3.2 (L) 3.4 - 5.0 g/dL    Protein Total 7.1 6.8 - 8.8 g/dL    Alkaline Phosphatase 58 40 - 150 U/L    ALT 14 0 - 50 U/L    AST 9 0 - 45 U/L   ABO/Rh type and screen   Result Value Ref Range    Units Ordered 1     ABO O     RH(D) Pos     Antibody Screen Neg     Test Valid Only At [facility]        Specimen Expires 06/24/2019     Crossmatch Red Blood Cells    Blood component   Result Value Ref Range    Unit Number [id number]     Blood Component Type Red Blood Cells Leukocyte Reduced     Division Number 00     Status of Unit Released to care unit 06/21/2019 0719     Blood Product Code [id number]     Unit Status ISS    Occult blood stool (ED Lab POCT Only 3-11)   Result Value Ref Range    Occult Blood Positive (A) NEG^Negative   XR Chest 2 Views    Narrative    CHEST TWO VIEWS  6/21/2019 2:52 AM     HISTORY: Weakness.    COMPARISON: 5/7/2019.    FINDINGS: Left-sided pacer device. The heart is enlarged.  Moderate-sized right pleural effusion. Left lower lobe air-space  opacity similar to prior. No  pneumothorax.      Impression    IMPRESSION: Persistent left lower lobe air-space opacity may represent  continued pneumonia. Moderate-sized right effusion.    MALICK MARTINEZ MD   Blood culture   Result Value Ref Range    Specimen Description Blood Left Hand     Special Requests Aerobic and anaerobic bottles received     Culture Micro No growth after 4 hours    Blood culture   Result Value Ref Range    Specimen Description Blood Left Arm     Special Requests Aerobic and anaerobic bottles received     Culture Micro No growth after 4 hours    Procalcitonin   Result Value Ref Range    Procalcitonin 0.05 ng/ml   Nt probnp inpatient   Result Value Ref Range    N-Terminal Pro BNP Inpatient 11,810 (H) 0 - 1,800 pg/mL   Basic metabolic panel   Result Value Ref Range    Sodium 145 (H) 133 - 144 mmol/L    Potassium 3.3 (L) 3.4 - 5.3 mmol/L    Chloride 104 94 - 109 mmol/L    Carbon Dioxide 41 (H) 20 - 32 mmol/L    Anion Gap <1 (L) 3 - 14 mmol/L    Glucose 149 (H) 70 - 99 mg/dL    Urea Nitrogen 55 (H) 7 - 30 mg/dL    Creatinine 2.02 (H) 0.52 - 1.04 mg/dL    GFR Estimate 22 (L) >60 mL/min/[1.73_m2]    GFR Estimate If Black 26 (L) >60 mL/min/[1.73_m2]    Calcium 8.1 (L) 8.5 - 10.1 mg/dL   CBC with platelets differential   Result Value Ref Range    WBC 8.6 4.0 - 11.0 10e9/L    RBC Count 3.18 (L) 3.8 - 5.2 10e12/L    Hemoglobin 7.0 (L) 11.7 - 15.7 g/dL    Hematocrit 25.4 (L) 35.0 - 47.0 %    MCV 80 78 - 100 fl    MCH 22.0 (L) 26.5 - 33.0 pg    MCHC 27.6 (L) 31.5 - 36.5 g/dL    RDW 18.0 (H) 10.0 - 15.0 %    Platelet Count 141 (L) 150 - 450 10e9/L    Diff Method Automated Method     % Neutrophils 82.9 %    % Lymphocytes 6.1 %    % Monocytes 10.4 %    % Eosinophils 0.4 %    % Basophils 0.1 %    % Immature Granulocytes 0.1 %    Absolute Neutrophil 7.1 1.6 - 8.3 10e9/L    Absolute Lymphocytes 0.5 (L) 0.8 - 5.3 10e9/L    Absolute Monocytes 0.9 0.0 - 1.3 10e9/L    Absolute Eosinophils 0.0 0.0 - 0.7 10e9/L    Absolute Basophils 0.0 0.0 -  0.2 10e9/L    Abs Immature Granulocytes 0.0 0 - 0.4 10e9/L   Troponin I   Result Value Ref Range    Troponin I ES 0.022 0.000 - 0.045 ug/L   Gastroenterology IP Consult: acute GI bleeding; Consultant may enter orders: Yes; Patient to be seen: Routine - within 24 hours; Requested Clinic/Group: [facility]; Requesting provider? Hospitalist (if different from attending physician)    Narrative    Tara Vang PA-C     6/21/2019  8:58 AM  [facility]  Gastroenterology Consultation    Helene Gibbs  [address] [facility] [id number]  84 year old female    Admission Date/Time: 6/21/2019  Primary Care Provider: Neisha Esparza    We were asked to see the patient in consultation by Dr. Montes   for evaluation of acute GI bleeding, anemia.        HPI:  Helene Gibbs is a 84 year old female who has an   extensive past medical history notable for coronary artery   disease, chronic diastolic congestive heart failure and tricuspid   regurgitation, chronic respiratory failure due to COPD,   restrictive lung disease, severe pulmonary hypertension, chronic   atrial fibrillation on Eliquis, recurrent lower GI bleeding,   chronic anemia, CKD stage 4, type 2 DM, GERD who presents with   weakness and dyspnea and is found to have recurrent left lower   lobe pneumonia and right sided pleural effusion, as well as,   acute on chronic anemia.    Patient last hospitalized 5.2019 for weakness and dyspnea and   treated for acute CHF exacerbation with diuresis.  She received   Levaquin for finding of left lower lobe infiltrate.  Hospice was   considered given extensive medical problems and patient declined.    The patient has a history of colonic AVMs causing recurrent GI   bleeds.  EGD and colonoscopy completed 2/13/2019.  EGD was   normal.  Colonoscopy showed innumerable angiectasias throughout   the cecum, proximal and mid ascending colon.  The remained of the   colonoscopy was unremarkable.    Fecal occult blood  testing is positive but no report of   hematochezia or melena.  Hemoglobin 7.0 (9.2 on 5/22/2019).    Eliquis is being held.      ROS: A comprehensive ten point review of systems was negative   aside from those in mentioned in the HPI.      MEDICATIONS:   No current facility-administered medications on file prior to   encounter.   Current Outpatient Medications on File Prior to Encounter:  ACCU-CHEK SMARTVIEW test strip USE 1 STRIP BY IN VITRO ROUTE 2   TIMES DAILY OR AS DIRECTED   ACE/ARB/ARNI NOT PRESCRIBED (INTENTIONAL) Please choose reason   not prescribed, below   albuterol (PROAIR HFA, PROVENTIL HFA, VENTOLIN HFA) 108 (90 BASE)   MCG/ACT inhaler Inhale 2 puffs into the lungs every 4 hours as   needed for shortness of breath / dyspnea or wheezing   apixaban ANTICOAGULANT (ELIQUIS) 2.5 MG tablet Take 1 tablet (2.5   mg) by mouth 2 times daily   ASPIRIN NOT PRESCRIBED (INTENTIONAL) Please choose reason not   prescribed, below   bisacodyl (DULCOLAX) 5 MG EC tablet Take 10 mg by mouth At   Bedtime   blood glucose monitoring (NO BRAND SPECIFIED) meter device kit   Accucheck Mariza meter; verified with pharmacist   denosumab (PROLIA) 60 MG/ML SOLN injection Inject 1 mL (60 mg)   Subcutaneous every 6 months   erythromycin (ROMYCIN) 5 MG/GM ophthalmic ointment Place 0.5   inches Into the left eye 3 times daily   fluticasone (FLONASE) 50 MCG/ACT nasal spray Spray 2 sprays into   both nostrils daily (Patient taking differently: Spray 2 sprays   into both nostrils every evening )   glipiZIDE (GLUCOTROL) 5 MG tablet Take 1 tablet (5 mg) by mouth   every morning (before breakfast)   hydrocortisone (ANUSOL-HC) 2.5 % cream Place rectally 2 times   daily as needed for hemorrhoids   hydrOXYzine (VISTARIL) 25 MG capsule Take 1 capsule (25 mg) by   mouth 2 times daily as needed for itching   hypromellose (ARTIFICIAL TEARS) 0.5 % SOLN ophthalmic solution   Place 1 drop into both eyes every hour as needed for dry eyes   lidocaine  (LIDODERM) 5 % patch APPLY UP TO 3 PATCHES TO PAINFUL   AREA AT ONCE FOR 12 HOUR PERIOD. REMOVE AFTER 12 HOURS   metoprolol tartrate (LOPRESSOR) 25 MG tablet TAKE 1 TABLET BY   MOUTH TWICE A DAY   pregabalin (LYRICA) 75 MG capsule Take 1 capsule (75 mg) by mouth   2 times daily   ranitidine (ZANTAC) 150 MG tablet Take 1 tablet (150 mg) by mouth   At Bedtime (Patient taking differently: Take 150 mg by mouth   daily )   simvastatin (ZOCOR) 10 MG tablet Take 1 tablet (10 mg) by mouth   At Bedtime   tiotropium (SPIRIVA) 18 MCG inhaled capsule Inhale 1 capsule (18   mcg) into the lungs daily   torsemide (DEMADEX) 20 MG tablet Take 1 tablet (20 mg) by mouth 2   times daily   traMADol (ULTRAM) 50 MG tablet Take 1 tablet (50 mg) by mouth   every 6 hours as needed for severe pain   triamcinolone (KENALOG) 0.1 % external cream Apply topically 2   times daily       ALLERGIES:   Allergies   Allergen Reactions     Ambien [Zolpidem Tartrate] Visual Disturbance     Hallucinations and fell out of bed at night.     Penicillins Hives     Definity      Caused a syncopal episode.     Sulfa Drugs Itching     Cymbalta Rash     Fluconazole Rash     Tolerates miconazole suppositories       Past Medical History:   Diagnosis Date     Anemia      Ankle arthritis      Arthritis      Back pain      Bell's palsy 9/08    left     Breast CA (H) 2004-    left lumpectomy, radiation, -recurrence     Breast cancer (H) 2004    Left breast lumpectomy w LN disection, and radiation therapy     CKD (chronic kidney disease)     stage 3 baseline Cr 1.3     Colon polyps     colonoscopy-9/12-next due in 9/13     Congestive heart failure (H)      COPD (chronic obstructive pulmonary disease) (H)      Coronary artery disease      DM (diabetes mellitus) (H)      GERD (gastroesophageal reflux disease)      Hyperlipidemia      Hypertension      Lumbar spinal stenosis     use cane     Nicotine dependence      Obesity      Osteoporosis      Other chronic pain       Pacemaker      Permanent atrial fibrillation (H) 8/2008    S/P AV node ablation and pacemaker 10-25-12       Pleural effusion      Pulmonary hypertension (H)      PVD (peripheral vascular disease) (H)      Rectal stenosis      Tobacco abuse     current     Tricuspid regurgitation        Past Surgical History:   Procedure Laterality Date     ARTHROPLASTY HIP Left 10/20/2018    Procedure: LEFT HIP HERACLIO-ARTHROPLASTY ;  Surgeon: Ish Fish MD;  Location:  OR     ARTHROSCOPY SHOULDER RT/LT  1999, 2004    Bilateral, right then left     BONE MARROW BIOPSY, BONE SPECIMEN, NEEDLE/TROCAR N/A 8/29/2017    Procedure: BIOPSY BONE MARROW;  BONE MARROW BIOPSY;  Surgeon:   Marino Cohen MD;  Location:  GI     C DEXA, BONE DENSITY, AXIAL SKEL       C MAMMOGRAM, SCREENING  1/2009     COLONOSCOPY N/A 10/7/2016    Procedure: COMBINED COLONOSCOPY, SINGLE OR MULTIPLE   BIOPSY/POLYPECTOMY BY BIOPSY;  Surgeon: Cassandra Mccord MD;    Location:  GI     COLONOSCOPY N/A 2/15/2019    Procedure: COLONOSCOPY;  Surgeon: Juan Pablo Hayden DO;    Location:  GI     ESOPHAGOSCOPY, GASTROSCOPY, DUODENOSCOPY (EGD), COMBINED N/A   8/10/2017    Procedure: COMBINED ESOPHAGOSCOPY, GASTROSCOPY, DUODENOSCOPY   (EGD), BIOPSY SINGLE OR MULTIPLE;  gastroscopy;  Surgeon: Helene Bustamante MD;  Location:  GI     ESOPHAGOSCOPY, GASTROSCOPY, DUODENOSCOPY (EGD), COMBINED N/A   2/15/2019    Procedure: COMBINED ESOPHAGOSCOPY, GASTROSCOPY, DUODENOSCOPY   (EGD);  Surgeon: Juan Pablo Hayden DO;  Location:  GI     H ABLATION AV NODE  10/2012     HC COLONOSCOPY THRU STOMA, DIAGNOSTIC  ? 2005     IMPLANT PACEMAKER  10/2012    [place] [id number], [id number]     IRRIGATION AND DEBRIDEMENT LOWER EXTREMITY, COMBINED Left   12/21/2018    Procedure: INCISION AND DRAINAGE OF LEFT HIP WITH EXCISION OF   BONE FRAGMENT;  Surgeon: Ish Fish MD;  Location:  OR     JOINT REPLACEMTN, KNEE RT/LT      Joint Replacement knee bilateral     LAPAROSCOPIC  "CHOLECYSTECTOMY WITH CHOLANGIOGRAMS N/A 2015      Procedure: LAPAROSCOPIC CHOLECYSTECTOMY WITH CHOLANGIOGRAMS;    Surgeon: Ervin Amos MD;  Location:  OR     LUMPECTOMY BREAST      Left-     PHACOEMULSIFICATION CLEAR CORNEA WITH STANDARD INTRAOCULAR LENS   IMPLANT Left 2015    Procedure: PHACOEMULSIFICATION CLEAR CORNEA WITH STANDARD   INTRAOCULAR LENS IMPLANT;  Surgeon: Sai Obregon MD;    Location: [facility]     PHACOEMULSIFICATION CLEAR CORNEA WITH TORIC INTRAOCULAR LENS   IMPLANT Right 2017    Procedure: PHACOEMULSIFICATION CLEAR CORNEA WITH TORIC   INTRAOCULAR LENS IMPLANT;  RIGHT EYE PHACOEMULSIFICATION CLEAR   CORNEA WITH TORIC INTRAOCULAR LENS IMPLANT ;  Surgeon: Duc Richmond MD;  Location: [facility]         SOCIAL HISTORY:  Social History     Tobacco Use     Smoking status: Former Smoker     Packs/day: 0.25     Years: 45.00     Pack years: 11.25     Types: Cigarettes     Last attempt to quit: 2018     Years since quittin.0     Smokeless tobacco: Never Used   Substance Use Topics     Alcohol use: No     Alcohol/week: 0.0 oz     Drug use: No       FAMILY HISTORY:  Family History   Problem Relation Age of Onset     Hypertension Mother      Diabetes Mother          at 83 yrs     C.A.D. Father          age 70s     Breast Cancer No family hx of      Colon Cancer No family hx of        PHYSICAL EXAM:   /48 (BP Location: Right arm)   Pulse 72   Temp 95.4  F   (35.2  C) (Axillary)   Resp 18   Ht 1.626 m (5' 4\")   Wt 68.9   kg (152 lb)   LMP  (LMP Unknown)   SpO2 97%   BMI 26.09 kg/m      Constitutional: NAD, comfortable  Cardiovascular: RRR, normal S1 and S2, no r/c/g/m  Respiratory: CTAB  Psychiatric: mentation appears normal and affect normal  Head: Normocephalic. Atraumatic.    Neck: Neck supple. No adenopathy. Thyroid symmetric, normal size,   trachea midline  Eyes:  PERRL, no icterus  ENT: Hearing adequate, pharynx normal without erythema or " exudate  Abdomen:   Auscultation: + BS  Appearance: non-distended  Palpation: non-tender  NEURO: grossly negative  SKIN: no suspicious lesions or rashes  LYMPH:   anterior cervical: no adenopathy  posterior cervical: no adenopathy  supraclavicular: no adenopathy          ADDITIONAL COMMENTS:   I reviewed the patient's new clinical lab test results.   Recent Labs   Lab Test 06/21/19  0511 06/21/19  0149 06/12/19  1120  05/07/19  0651  10/19/18  2156  01/05/18  0800   WBC 8.6 10.4 4.6   < > 10.3   < > 9.7   < > 7.0   HGB 7.0* 7.8* 9.1*   < > 10.6*   < > 9.3*   < > 11.8   MCV 80 79 82   < > 83   < > 93   < > 93   * 152 164   < > 164   < > 260   < > 215   INR  --   --   --   --  1.17*  --  1.54*  --  1.00    < > = values in this interval not displayed.     Recent Labs   Lab Test 06/21/19  0511 06/21/19  0216 06/21/19  0149   * 144 Canceled, Test credited, specimen discarded   POTASSIUM 3.3* 3.3* Canceled, Test credited, specimen discarded   CHLORIDE 104 101 Canceled, Test credited, specimen discarded   CO2 41* 37* Canceled, Test credited, specimen discarded   BUN 55* 57* Canceled, Test credited, specimen discarded   CR 2.02* 1.92* Canceled, Test credited, specimen discarded   ANIONGAP <1* 6 Canceled, Test credited, specimen discarded   GERARD 8.1* 8.6 Canceled, Test credited, specimen discarded   * 143* Canceled, Test credited, specimen discarded     Recent Labs   Lab Test 06/21/19  0216 06/21/19  0150 06/21/19  0149 05/07/19  1734 05/03/19  1716 05/03/19  1637  10/23/18  0722  07/18/18  1200  07/16/18  1718  05/22/18  1639  03/29/16  1301  12/09/15  0714   ALBUMIN 3.2*  --  Canceled, Test credited, specimen discarded  --    3.3*  --    < > 2.4*   < > 3.5  --  3.5  --  4.3   < > 3.5   < >   3.2*   BILITOTAL 1.5*  --  Canceled, Test credited, specimen discarded    --  0.6  --    < > 0.8   < > 1.0  --  1.1  --  0.7   < > 0.6   <   > 0.8   DBIL  --   --   --   --   --   --   --  0.4*  --   --   --   --      --   --   --  0.2  --  0.5*   ALT 14  --  Canceled, Test credited, specimen discarded  --  14    --    < > 229*   < > 17  --  18  --  24   < > 24   < > 156*   AST 9  --  Canceled, Test credited, specimen discarded  --  10    --    < > 110*   < > 13  --  14  --  27   < > 10   < > 54*   ALKPHOS 58  --  Canceled, Test credited, specimen discarded  --    73  --    < > 105   < > 79  --  78  --  82   < > 96   < > 141   PROTEIN  --  Negative  --  Negative  --  Negative   < >  --    <   >  --    < >  --    < >  --    < >  --    < >  --    LIPASE  --   --   --   --   --   --   --   --   --  78  --  62*    --  120   < >  --   --   --     < > = values in this interval not displayed.             .    CONSULTATION ASSESSMENT AND PLAN:      85 yo female with complex past medical history including CAD,   chronic respiratory failure due to COPD, CHF, chronic atrial   fibrillation on Eliquis, colonic angiectasias causing recurrent   GI bleed and chronic anemia, CKD stage 4 who now presents with   recurrent left lower lobe pneumonia and right sided pleural   effusion.  She has acute on chronic anemia in the setting of heme   + stool.  Last EGD/colonoscopy in 2/2019 showed innumerable AVMs   in the colon.    -  Monitor H/H; transfuse prn.  -  Patient may benefit from iron infusions.  -  Unlikely that further endoscopic workup will be helpful, will   discuss with Dr. Bustamante.  -  Stool should not be heme tested as will always be positive,   given innumerable AVMs in colon.      I discussed the patient's findings and plan with Dr. Bustamante.          Tara Vang, PAC  [facility]  Office:  [phone] call if needed after 5PM  Cell:  [phone], not available after 5PM at this number     Troponin I   Result Value Ref Range    Troponin I ES <0.015 0.000 - 0.045 ug/L   Glucose by meter   Result Value Ref Range    Glucose 143 (H) 70 - 99 mg/dL   Hemoglobin   Result Value Ref Range    Hemoglobin 9.1 (L) 11.7 - 15.7 g/dL    Troponin I   Result Value Ref Range    Troponin I ES 0.021 0.000 - 0.045 ug/L     *Note: Due to a large number of results and/or encounters for the requested time period, some results have not been displayed. A complete set of results can be found in Results Review.

## 2019-06-21 NOTE — PROVIDER NOTIFICATION
MD Notification    Notified Person: MD    Notified Person Name:  on-call    Notification Date/Time: 6:42 PM June 21, 2019    Notification Interaction: paged     Purpose of Notification: hypotensive, Given bolus 250 ml per MD, vitals after BP 90/40 HR:71, pt. Asymptomatic.     Orders Received: check BP and HR in another hour, 2000 and go from there.     Comments:

## 2019-06-21 NOTE — CONSULTS
Consult Date:  06/21/2019      CARDIOLOGY CONSULTATION       REASON FOR CONSULTATION:  Weakness and possible acute on chronic congestive heart failure.      HISTORY OF PRESENT ILLNESS:  The patient is an 84-year-old female with a history of atrial fibrillation with rapid ventricular response, status post AV alexandra ablation and pacemaker implantation in 2012, moderate to severe tricuspid regurgitation and severe pulmonary hypertension, who was admitted to the hospital with weakness, possible pneumonia and acute GI blood loss.  Her medical history is also notable for stage IV chronic kidney disease, diabetes, COPD and restrictive lung disease, acute on chronic anemia due to colon angioectasia, coronary artery disease, failure to thrive, frailty and possible pneumonia.      She was hospitalized last month with weakness and dyspnea and treated for possible acute diastolic heart failure exacerbation.  There is also a question of possible pneumonia and she received a course of Levaquin.  She was eventually discharged to transitional care unit.  She states that she has been weak since her recent hospitalization, but her symptoms have progressed over the last few days.  She has been unable to get out of her chair because of weakness.  She was subsequently brought to the Emergency Department and later admitted.  A chest x-ray here suggested possible ongoing pneumonia.  Her laboratory studies also demonstrated severe anemia with hemoglobin of 7.0.  She was seen by GI and she is currently getting blood transfusion.      She has chronic shortness of breath due to her chronic right-sided heart failure; however, she tells me, although symptoms are stable.  Her weight is actually down from her prior admission.  In May when she was admitted here, her discharge weight was 167 pounds.  She currently with 152 pounds.  Part of her weight loss is due to the fact that she has not been eating.      The patient was also recently evaluated  for a Watchman given her ongoing gastrointestinal bleed in the setting of anticoagulation.  The procedure was scheduled for last month but canceled because of her decompensation.  She tells me it is rescheduled for July.      PAST MEDICAL HISTORY:   1.  Chronic anemia.   2.  History of breast cancer.   3.  Chronic kidney disease.   4.  Chronic diastolic heart failure.   5.  Severe pulmonary hypertension.   6.  Moderate to severe tricuspid valve regurgitation.   7.  COPD and restrictive lung disease.   8.  Hypertension.   9.  Hyperlipidemia.   10.  Nicotine dependence.   11.  Permanent atrial fibrillation, status post AV alexandra ablation and permanent pacemaker implantation.   12.  Peripheral vascular disease.   13.  Frailty.      SOCIAL HISTORY:  She is a former smoker, quit approximately 6 months ago.  Denies excessive alcohol use.      FAMILY HISTORY:  Reviewed and noncontributory to this admission.      REVIEW OF SYSTEMS:  Compressive review of systems was performed and essentially negative except as mentioned in the HPI.      CURRENT MEDICATIONS:   1.  Cefepime 2 grams q.24 hours   2.  Tylenol 500 mg p.r.n.   3.  Insulin as directed.   4.  Levaquin 750 mg daily.      PHYSICAL EXAMINATION:   VITAL SIGNS:  Blood pressure is 99/48, pulse 72.   GENERAL:  She is resting.  She appears chronically ill.   VESSELS:  Jugular venous pressure is difficult to assess but appears to be moderately distended.   LUNGS:  Decreased breath sounds at the bases.   HEART:  Normal S1, S2.  A 1/6 systolic murmur mostly appreciated in the right parasternal border.   ABDOMEN:  Obese, soft, nontender.   EXTREMITIES:  Warm, trace edema bilaterally.   NEUROLOGIC:  No focal deficits.   HEENT:  Oropharynx is moist.  Mucous membranes are dry.   PSYCHIATRIC:  No focal deficits.      IMPRESSION:   1.  Chronic diastolic heart failure which is predominantly right sided.   2.  Known severe pulmonary hypertension.   3.  Known moderate to severe  tricuspid valve regurgitation.   4.  Chronic atrial fibrillation, status post AV alexandra ablation and permanent pacemaker implantation.   5.  Possible pneumonia.   6.  Acute on chronic anemia with evidence of recent acute GI blood loss.   7.  COPD and restrictive lung disease.   8.  Stage IV chronic kidney disease.   9.  Diabetes.   10.  Frailty.      ASSESSMENT AND PLAN:  The patient was admitted with progressive weakness and was found to have suspected ongoing lower lobe pneumonia as well as acute on chronic anemia with hemoglobin of 7.0.  She has received blood transfusion and is currently off all anticoagulants.  She is also getting treatment for possible pneumonia.      From a cardiac point of view, she does have chronic diastolic heart failure and from volume status appears to be a slightly hyperkalemic with mild lower extremity edema.  Overall, her weight is much improved compared to her recent admission.  Her current symptoms do not appear to be exacerbation of her chronic diastolic heart failure.      Her blood pressure has been on the lower side.  As such, all of her cardiac medications are currently on hold.  I recommend addressing her pneumonia and anemia as the primary team is doing.  Once her blood pressure improves, it is reasonable to give her a couple doses of IV diuretics and then transition her back to her oral diuretic program.      She has been evaluated for a Watchman and is currently scheduled for the procedure some time in July.  She does have a followup arranged with our colleagues in EP prior to the procedure.  It is unclear whether she will be able to tolerate general anesthesia in order for her to undergo the procedure given her medical condition.      I appreciate the opportunity to be part of her care.         AURY ELDER MD             D: 2019   T: 2019   MT: STAN      Name:     PATRICIA BOBO   MRN:      [id number]        Account:       [id number]   :       1935           Consult Date:  06/21/2019      Document: [id number]       cc: Neisha Esparza MD

## 2019-06-21 NOTE — ED NOTES
"[facility]  ED Nurse Handoff Report    ED Chief complaint: Generalized Weakness      ED Diagnosis:   Final diagnoses:   Anemia, unspecified type   Generalized weakness   Fever, unspecified fever cause   Pneumonia due to infectious organism, unspecified laterality, unspecified part of lung       Code Status: to be addressed by admitting doctor    Allergies:   Allergies   Allergen Reactions     Ambien [Zolpidem Tartrate] Visual Disturbance     Hallucinations and fell out of bed at night.     Penicillins Hives     Definity      Caused a syncopal episode.     Sulfa Drugs Itching     Cymbalta Rash     Fluconazole Rash     Tolerates miconazole suppositories       Activity level - Baseline/Home:  Stand with Assist of 2  Activity Level - Current:   Stand with Assist of 2    Patient's Preferred language: english   Needed?: No    Isolation: No  Infection: Not Applicable  Bariatric?: No    Vital Signs:   Vitals:    06/21/19 0237 06/21/19 0256 06/21/19 0300 06/21/19 0338   BP:  104/55 104/55    Pulse:  69     Resp:  17 24 18   Temp: 100.6  F (38.1  C)      TempSrc: Rectal      SpO2:  96% 96% 100%   Weight:       Height:           Cardiac Rhythm: ,   Cardiac  Cardiac Rhythm: Normal sinus rhythm    Pain level:      Is this patient confused?: No   Does this patient have a guardian?  No         If yes, is there guardianship documents in the Epic \"Code/ACP\" activity?  No         Guardian Notified?  N/A  [place] - Suicide Severity Rating Scale Completed?  Yes  If yes, what color did the patient score?  White    Patient Report: Initial Complaint: Pt resides at assisted living, called for assistance to get from bed to bathroom. Uses PRN home O2.felt increased SOB  Hours before calling for help.pt had recent fall anto her Buttocks.  Focused Assessment: Pt tired but able to give report of what happened tonight. Pt on 3LPM O2 per NC. sats 97%. Denies CP. Able to voice needs.   Tests Performed:   Results for " orders placed or performed during the hospital encounter of 06/21/19   XR Chest 2 Views    Narrative    CHEST TWO VIEWS  6/21/2019 2:52 AM     HISTORY: Weakness.    COMPARISON: 5/7/2019.    FINDINGS: Left-sided pacer device. The heart is enlarged.  Moderate-sized right pleural effusion. Left lower lobe air-space  opacity similar to prior. No pneumothorax.      Impression    IMPRESSION: Persistent left lower lobe air-space opacity may represent  continued pneumonia. Moderate-sized right effusion.    MALICK MARTINEZ MD   CBC with platelets differential   Result Value Ref Range    WBC 10.4 4.0 - 11.0 10e9/L    RBC Count 3.50 (L) 3.8 - 5.2 10e12/L    Hemoglobin 7.8 (L) 11.7 - 15.7 g/dL    Hematocrit 27.7 (L) 35.0 - 47.0 %    MCV 79 78 - 100 fl    MCH 22.3 (L) 26.5 - 33.0 pg    MCHC 28.2 (L) 31.5 - 36.5 g/dL    RDW 18.1 (H) 10.0 - 15.0 %    Platelet Count 152 150 - 450 10e9/L    Diff Method Automated Method     % Neutrophils 83.8 %    % Lymphocytes 4.0 %    % Monocytes 11.7 %    % Eosinophils 0.1 %    % Basophils 0.2 %    % Immature Granulocytes 0.2 %    Nucleated RBCs Unable to Calculate 0 /100    Absolute Neutrophil 8.7 (H) 1.6 - 8.3 10e9/L    Absolute Lymphocytes 0.4 (L) 0.8 - 5.3 10e9/L    Absolute Monocytes 1.2 0.0 - 1.3 10e9/L    Absolute Eosinophils 0.0 0.0 - 0.7 10e9/L    Absolute Basophils 0.0 0.0 - 0.2 10e9/L    Abs Immature Granulocytes 0.0 0 - 0.4 10e9/L    Absolute Nucleated RBC Unable to Calculate    Comprehensive metabolic panel   Result Value Ref Range    Sodium Canceled, Test credited, specimen discarded 133 - 144 mmol/L    Potassium Canceled, Test credited, specimen discarded 3.4 - 5.3 mmol/L    Chloride Canceled, Test credited, specimen discarded 94 - 109 mmol/L    Carbon Dioxide Canceled, Test credited, specimen discarded 20 - 32 mmol/L    Anion Gap Canceled, Test credited, specimen discarded 6 - 17 mmol/L    Glucose Canceled, Test credited, specimen discarded 70 - 99 mg/dL    Urea Nitrogen Canceled,  Test credited, specimen discarded 7 - 30 mg/dL    Creatinine Canceled, Test credited, specimen discarded 0.52 - 1.04 mg/dL    GFR Estimate Canceled, Test credited, specimen discarded >60 mL/min/[1.73_m2]    GFR Estimate If Black Canceled, Test credited, specimen discarded >60 mL/min/[1.73_m2]    Calcium Canceled, Test credited, specimen discarded 8.5 - 10.1 mg/dL    Bilirubin Total Canceled, Test credited, specimen discarded 0.2 - 1.3 mg/dL    Albumin Canceled, Test credited, specimen discarded 3.4 - 5.0 g/dL    Protein Total Canceled, Test credited, specimen discarded 6.8 - 8.8 g/dL    Alkaline Phosphatase Canceled, Test credited, specimen discarded 40 - 150 U/L    ALT Canceled, Test credited, specimen discarded 0 - 50 U/L    AST Canceled, Test credited, specimen discarded 0 - 45 U/L   UA with Microscopic   Result Value Ref Range    Color Urine Yellow     Appearance Urine Clear     Glucose Urine Negative NEG^Negative mg/dL    Bilirubin Urine Negative NEG^Negative    Ketones Urine Negative NEG^Negative mg/dL    Specific Gravity Urine 1.011 1.003 - 1.035    Blood Urine Negative NEG^Negative    pH Urine 6.5 5.0 - 7.0 pH    Protein Albumin Urine Negative NEG^Negative mg/dL    Urobilinogen mg/dL Normal 0.0 - 2.0 mg/dL    Nitrite Urine Negative NEG^Negative    Leukocyte Esterase Urine Negative NEG^Negative    Source Catheterized Urine     WBC Urine 1 0 - 5 /HPF    RBC Urine <1 0 - 2 /HPF    Squamous Epithelial /HPF Urine 2 (H) 0 - 1 /HPF    Mucous Urine Present (A) NEG^Negative /LPF   Lactic acid   Result Value Ref Range    Lactic Acid 1.7 0.7 - 2.0 mmol/L   Comprehensive metabolic panel   Result Value Ref Range    Sodium 144 133 - 144 mmol/L    Potassium 3.3 (L) 3.4 - 5.3 mmol/L    Chloride 101 94 - 109 mmol/L    Carbon Dioxide 37 (H) 20 - 32 mmol/L    Anion Gap 6 3 - 14 mmol/L    Glucose 143 (H) 70 - 99 mg/dL    Urea Nitrogen 57 (H) 7 - 30 mg/dL    Creatinine 1.92 (H) 0.52 - 1.04 mg/dL    GFR Estimate 24 (L) >60  mL/min/[1.73_m2]    GFR Estimate If Black 28 (L) >60 mL/min/[1.73_m2]    Calcium 8.6 8.5 - 10.1 mg/dL    Bilirubin Total 1.5 (H) 0.2 - 1.3 mg/dL    Albumin 3.2 (L) 3.4 - 5.0 g/dL    Protein Total 7.1 6.8 - 8.8 g/dL    Alkaline Phosphatase 58 40 - 150 U/L    ALT 14 0 - 50 U/L    AST 9 0 - 45 U/L   Occult blood stool (ED Lab POCT Only 3-11)   Result Value Ref Range    Occult Blood Positive (A) NEG^Negative   Procalcitonin   Result Value Ref Range    Procalcitonin 0.05 ng/ml   Nt probnp inpatient   Result Value Ref Range    N-Terminal Pro BNP Inpatient 11,810 (H) 0 - 1,800 pg/mL   EKG 12-lead, tracing only   Result Value Ref Range    Interpretation ECG Click View Image link to view waveform and result      *Note: Due to a large number of results and/or encounters for the requested time period, some results have not been displayed. A complete set of results can be found in Results Review.     Abnormal Results: BNP 11,810, Procalcitonin 0.05  Treatments provided: IV antibiotics    Family Comments: none    OBS brochure/video discussed/provided to patient/family: N/A              Name of person given brochure if not patient: n/a              Relationship to patient: n/a    ED Medications:   Medications   ceFEPIme (MAXIPIME) 2 g vial to attach to  ml bag for ADULTS or 50 ml bag for PEDS (2 g Intravenous New Bag 6/21/19 0337)   levofloxacin (LEVAQUIN) infusion 750 mg (has no administration in time range)   0.9% sodium chloride BOLUS (0 mLs Intravenous Stopped 6/21/19 0237)   acetaminophen (TYLENOL) tablet 500 mg (500 mg Oral Given 6/21/19 0241)       Drips infusing?:  Yes    For the majority of the shift this patient was Green.   Interventions performed were none    Severe Sepsis OR Septic Shock Diagnosis Present: No    To be done/followed up on inpatient unit:  IV antibiotics    ED NURSE PHONE NUMBER: #[id number]

## 2019-06-21 NOTE — ED PROVIDER NOTES
History   Chief Complaint:  Generalized weakness    HPI   Helene Gibbs is a 84 year old female, with a history of CHF, CKD, GERD, DM II, hyperlipidemia, hypertension, PVD, atrial fibrillation, breast cancer, and on Eliquis, who presents to the ED for evaluation of generalized weakness. The patient reports trying to use the restroom tonight when she had to call her assistance living help to transfer her from her chair to the toilet because her legs and arms were too weak. The patient states she has oxygen PRN at home, but recently for the past couple hours has had to increase her oxygen due to her shortness of breath. She also notes she has had a fall recently and fell on her buttocks. The patient denies any fever, headache, cough, chest pain, abdominal pain, rashes, numbness, or any other acute symptoms or injuries.      Allergies:  Ambien  Penicillins  Definity  Sulfa Drugs  Cymbalta  Fluconazole     Medications:    Albuterol  Eliquis  Dulcolax  Prolia  Romycin  Flonase  Glucotrol  Vistaril  Lidoderm  Lopressor  Lyrica  Zantac  Zocor  Spiriva  Demadex  Ultram    Past Medical History:    Anemia  Arthritis  Bell's palsy  Breast cancer  CKD  CHF  COPD  DM II  GERD  Hyperlipidemia  Hypertension  Lumbar stenosis  Obesity  Osteoporosis  Pacemaker  Permanent atrial fibrillation  Pulmonary hypertension  PVD  Rectal stenosis  Tricuspid regurgitation     Past Surgical History:    Left hip arthroplasty  Bilateral shoulder arthroscopy  AV node ablation  Pacemaker implantation  Bilateral knee replacement  Lumpectomy left breast   Bilateral intraocular lens implant    Family History:    Hypertension  Diabetes  CAD    Social History:  Smoking status: Former-6/1/18  Alcohol use: No  Marital Status:   [5]     Review of Systems   Constitutional: Negative for fever.   Respiratory: Positive for shortness of breath. Negative for cough.    Cardiovascular: Negative for chest pain.   Gastrointestinal: Negative for abdominal  "pain.   Skin: Negative for rash.   Neurological: Positive for weakness. Negative for numbness and headaches.   All other systems reviewed and are negative.    Physical Exam     Patient Vitals for the past 24 hrs:   BP Temp Temp src Pulse Heart Rate Resp SpO2 Height Weight   06/21/19 0338 -- -- -- -- 70 18 100 % -- --   06/21/19 0300 104/55 -- -- -- 70 24 96 % -- --   06/21/19 0256 104/55 -- -- 69 70 17 96 % -- --   06/21/19 0237 -- 100.6  F (38.1  C) Rectal -- -- -- -- -- --   06/21/19 0218 -- -- -- -- 69 17 100 % -- --   06/21/19 0215 92/62 -- -- 68 69 26 99 % -- --   06/21/19 0137 94/60 98.1  F (36.7  C) Oral 69 69 10 99 % 1.626 m (5' 4\") 68.9 kg (152 lb)   06/21/19 0125 99/75 -- -- 68 -- -- -- -- --     Physical Exam  VS: Reviewed per above  HENT: Mucous membranes moist  EYES: sclera anicteric  CV: Rate as noted,  regular rhythm.   RESP: Effort normal. L basilar crackles, decreased breath sounds on the right.  GI: no rebound guarding or  tenderness, not distended.  NEURO: Alert, moving all extremities  MSK: No deformity of the extremities  SKIN: Warm and dry    Emergency Department Course   ECG (1:38:24):  Rate 70 bpm. MA interval 110. QRS duration 148. QT/QTc 464/501. P-R-T axes * -71 111. Sinus rhythm with short MA. Left axis deviation. Nonspecific intraventricular block. Lateral infarct, age undetermined. Inferior infarct, age undetermined. Abnormal ECG. No significant change compared to EKG on 5/3/19 Interpreted at 0205 by Augustus Almeida MD.    Imaging:  Radiographic findings were communicated with the patient who voiced understanding of the findings.    XR Chest, 2 views:  Persistent left lower lobe air-space opacity may represent  continued pneumonia. Moderate-sized right effusion.    Imaging independently reviewed and agree with radiologist interpretation.    Laboratory:  CBC: HGB 7.8 (L), o/w WNL (WBC 10.4, )    CMP: K 3.3 (L), CO2 37 (H),  (H), BUN 57 (H), Creatinine 1.92 (H), GFR 24 " (L), Bilirubin 1.5 (H) Albumin 3.2 (L), o/w WNL    Procalcitonin: 0.05    BNP: 11,810 (H)    Lactic Acid (0223): 1.7    UA: Squamous Epithelial/ HPF 2 (H), Mucous Present, o/w Negative    Blood Culture x2: Pending    Occult blood stool: Positive    Interventions:  0218: NS 500mL IV Bolus   0241: Tylenol 500 mg PO  0337: Maxipime 2 g vial in  mL bag    Emergency Department Course:  The patient arrived in the emergency department via EMS.  Past medical records, nursing notes, and vitals reviewed.  0228: I performed an exam of the patient and obtained history, as documented above.  IV inserted and blood drawn.  The patient was sent for a chest x-ray while in the emergency department, findings above.    0322: I rechecked the patient. Explained findings to patient.    Findings and plan explained to the Patient who consents to admission.     0326: Discussed the patient with Dr. Montes, who will admit the patient to a medical telemetry bed for further monitoring, evaluation, and treatment.     Impression & Plan    Medical Decision Making:  Patient presents to the ER for evaluation of generalized weakness, shortness of breath.  Exam reveals left basilar crackles, decreased breath sounds on the right.  Vitals notable for temperature 100.6  F rectally.  Blood cultures were obtained the lactic acid was normal.  No exam findings of skin or soft tissue infection, urinalysis without evidence of infection, x-ray was concerning for possible persistent left lower lobe pneumonia as well as right sided effusion.  She was given cefepime and Levaquin for HCAP coverage given recent hospitalization.  BNP was also quite elevated so heart failure exacerbation could be contributing but she appears to have evidence of potential dehydration with elevated BUN and increasing creatinine.  Certainly cardiorenal RICK could be because of these changes but no diuretics were given in the ER.  Of note hemoglobin was lower than prior at 7.8 and  Hemoccult testing is positive.  She is known to have colonic angioctasia.  Not currently lower than transfusion threshold.  Patient remained stable in the ER prior to admission.    Diagnosis:    ICD-10-CM    1. Anemia, unspecified type D64.9 Blood culture     Blood culture     Procalcitonin     Nt probnp inpatient   2. Generalized weakness R53.1    3. Fever, unspecified fever cause R50.9    4. Pneumonia due to infectious organism, unspecified laterality, unspecified part of lung J18.9      Disposition:  Admitted to a medical telemetry bed.    Jesús Nick  6/21/2019    EMERGENCY DEPARTMENT  Scribe Disclosure:  Jesús SCHULTZ, am serving as a scribe at 2:28 AM on 6/21/2019 to document services personally performed by Augustus Almeida MD based on my observations and the provider's statements to me.         Augustus Almeida MD  06/21/19 0516

## 2019-06-21 NOTE — PROVIDER NOTIFICATION
MD Notification    Notified Person: MD    Notified Person Name: Dr. Jeong    Notification Date/Time: 4:43 PM June 21, 2019    Notification Interaction: paged    Purpose of Notification: BP: 83/37 HR: 70    Orders Received: After IV abx finish, give 0.9% sodium chloride bolus 250ml over an hour, telephone order with readback .     Comments:

## 2019-06-21 NOTE — PROGRESS NOTES
"[facility] Progress Note  [facility]    Visit per palliative consult.  Pt shared about her life, strength and independence during her career in in [place] and [facility].  \"I did important work.\" She said she has always been strong, and able to make her own decisions, and at this time she is very grateful for her niece Enedelia who is helping her.  Pt said she feels the need for 24 hour care, more than she is receiving in her assisted living apartment, but that she does not feel ready for hospice care, \"I am not ready to lay down and die.\"  Pt said she is not [employer]al, but would appreciate a prayer.     offered listening, support and prayer.      Pt said she is hoping to enjoy a quality of life, even if it is a nursing home.  \"It's where I need to be.\"  She said she appreciates the care she is receiving here, and from her niece, Enedelia.      SH remains available as needs arise, and upon request.  SH will follow.      Holly Dee  Chaplain Resident  "

## 2019-06-21 NOTE — PROVIDER NOTIFICATION
MD Notification    Notified Person: MD    Notified Person Name: Dr. Jeong     Notification Date/Time: 11:15 AM June 21, 2019    Notification Interaction: paged    Purpose of Notification: Completed one unit of blood. BP  in 90's/40-50's. Diet order? niece at bedside wondering about plan of care.     Orders Received:    Comments: Called DOLORES Vang, she gave okay for pt. To have a diet order per hospitalitis

## 2019-06-21 NOTE — PLAN OF CARE
Admitted to 833-2 at 0440.      [id number]: A&Ox4 but forgetful. SBP 80-90s/50s, 500cc bolus infusing. On 3L NC (uses 02 prn baseline). TELE 100% V paced. LS coarse, no cough. BS active, passing gas, no BM. Abd soft, nontender. Denies nausea. Reports chronic low back pain, repositioning helps. Hgb 7.0, 1 unit of RBCs ordered. Bgm checks. Bedrest, repo q2hr. Blanchable redness to coccyx and spine. Large bruise to R hip/bottom from fall pta. Neuros intact except for forgetfulness, baseline BLE neuropathy, and generalized weakness. Generalized trace edema. BLE +2 edema. Bilat eyelids puffy. K 3.3 replaced orally. NPO with meds. Plan for chest CT, cardio and GI consult, and echo today.     Niece - Enedelia Kamara - notified of patient's admission per patient request.

## 2019-06-21 NOTE — PROGRESS NOTES
Paged for recurrent hypotension; a small bolus of IV fluid is cautiously ordered. AM HGB and other labs are ordered and pending.

## 2019-06-21 NOTE — PROVIDER NOTIFICATION
MD Notification    Notified Person: MD    Notified Person Name: Simon    Notification Date/Time: 6/21/19 0457    Notification Interaction: page    Purpose of Notification: BP 82/41, HOB flattened, BP 77/55    Orders Received:    Comments:

## 2019-06-21 NOTE — PROVIDER NOTIFICATION
MD Notification    Notified Person: MD    Notified Person Name: Simon    Notification Date/Time: 6-21-19 0548    Notification Interaction: page    Purpose of Notification: Hgb 7.0. BPs not improving yet.     Orders Received:    Comments:

## 2019-06-21 NOTE — H&P
[facility]    History and Physical  Hospitalist       Date of Admission:  6/21/2019  Date of Service (when I saw the patient): 06/21/19    ASSESSMENT  Helene Gibbs is a pleasant 84 year old woman with extensive past medical history that is most notable for CAD; chronic diastolic CHF and Tricuspid regurgitation; chronic respiratory failure due to COPD, kyphosis and restrictive lung disease, and severe pulmonary hypertension; chronic atrial fibrillation on Eliquis; recurrent lower GI bleeding and chronic anemia; CKD Stage 4; and Type 2 Diabetes mellitus; and GERD; who presents with progressive weakness and dyspnea and is found to have suspected ongoing or recurrent left lower lobe pneumonia as well as right sided pleural effusion, and acute on chronic anemia.    PLAN    1) Possible pneumonia: She was hospitalized 5/2019 for weakness and dyspnea and treated for acute diastolic CHF exacerbation. She was diuresed. CXR at that time showed a right sided pleural effusion as well as left lung base infiltrate and she received a course of Levaquin, and was eventually discharged to TCU, and from there it seems she has been discharged home. PCP note from 6/12 documents care goal discussions including recommendations for consideration of hospice given ongoing progressive weakness and extensive medical co-morbidities; she declined that and it seems she has been set up for home services.      Now she presents for further weakness and dyspnea and seems unable to get up off the toilet this evening. She appears hypervolemic on exam though she could be intra-vascularly volume depleted. She had fever 100.6 with initial BP 92/62, improved to 104/55 after 500 ml IV fluid was given in the ED. CXR repeated and shows ongoing moderate sized effusion and left sided infiltrate. Overall, she could have new or ongoing pneumonia, vs malignancy, possibly with concomitant acute or progressive diastolic CHF exacerbation. She is  not wheezing and COPD exacerbation seems less likely. PE also less likely as she is on DOAC. Will rule out ACS.      -- Inpatient. Continue Cefepime for now (she is at risk for Pseudomonas). Follow up blood and sputum cultures and check Pro-calcitonin. Will check a Chest CT without contrast. Pending these results, could consider diagnostic/therapeutic thoracentesis. Holding DOAC for now as below.      -- Telemetry, serial enzymes, repeat TTE ordered. Given the complexity of her case, Cardiology is consulted for further evaluation while she is here      -- She appears to have some amount of encephalopathy at present though it is unclear to me if this is acute or chronic; monitor closely; consider BIPAP trial if respiratory status worsens. Hold outpatient Tramadol, Lyrica.    2) CAD and chronic diastolic CHF: She is followed closely by [facility] for multiple chronic issues. Most recent TTE 2/2019 showed RV volume overload and decreased RV function with preserved LVEF, severe TR and severe pulmonary hypertension. JOAN 5/6/2019 done in part for possible upcoming Watchman procedure showed similar findings. There is also note of a prior stress test in 2018 that showed evidence of apical ischemia.      -- Resume Lopressor, Zocor when verified. Resume Demadex 20 mg BID at the discretion of Cardiology    3) Acute on chronic anemia: She has a history of colonic angioid ectasias causing recurrent GI bleeds. Occult blood is positive today but it is unclear if she is having an active bleeding episode. There is report of bruising to the left buttock after a fall though she declines to let me see it at present; this could be another location causing anemia.     -- Hold Eliquis for now; serial HGB; consult GI if worsening     -- Examine the buttock region when able    4) CAF: Status post ablation and pacemaker placement; she is due to have Watchman done soon but this has already reportedly had to be postponed for acute illness  "last month and may need to be postponed further depending on her hospital course this stay.    -- As above    5) COPD and restrictive lung disease: She uses supplemental O2 at baseline (how much is not in fact known to me at present). O2 sats currently 96% on 3 L NC.      -- Nebs ordered; resume home inhalers when verified    6) Stage 4 CKD: Monitor at least daily while hospitalized    7) Diabetes: A1c 6.3 on 4/2019. Hold oral medications; ISS insulin ordered while hospitalized.     8) Hypokalemia: Replacing manually    Chief Complaint   Weakness    History is obtained from the patient and the ED physician whom I have spoken with    History of Present Illness   Helene Gibbs is a pleasant 84 year old woman who presents with weakness that she says is chronic, present for \"a long time\", at least since she left the hospital last month, and progressive to the point she could not get off the toilet tonight, associated with ongoing dyspnea and inablity to move. She is confused at times by my questions but thinks her chronic non-productive cough and leg edema are unchanged, and that she may have actually lost some weight in the past month or so. She says her appetite is very low. She fell at some point recently and struck her buttock but can not tell me exactly when this happened. She falls asleep during the interview. She otherwise denies chest pain, dysuria, blood in stool (adding however \"I don't really know\" if she has had this recently, or any other complaints.    In the ED, Blood pressure 104/55, pulse 69, temperature 100.6  F (38.1  C), temperature source Rectal, resp. rate 18, height 1.626 m (5' 4\"), weight 68.9 kg (152 lb), SpO2 100 %, not currently breastfeeding.    CBC notable for HGB 8 (it was 9 in 6/12/2019). BMP showed Na 143, K 3.3, Cl 101, CO2 37 (it was 41 viki 6/12), BUN 57, Cr 1.9 (it was also 1.9 on 6/12).  Hepatic panel showed Tbili 1.5 but other hepatic labs were in normal reference range. Lactate " "1.7. Stool occult blood positive. UA showed 1 WBC. EKG showed paced rhythm. Pro-BNP 04165 (it was 9000 last month). CXR reviewed by me and shows no change from 5/2019 x-ray revealing possible left lung base infiltrate and moderate sized right pleural effusion. She was given Tylenol, Levaquin, Cefepime, and 500 ml IV fluid.    PHYSICAL EXAM  Blood pressure 104/55, pulse 69, temperature 100.6  F (38.1  C), temperature source Rectal, resp. rate 18, height 1.626 m (5' 4\"), weight 68.9 kg (152 lb), SpO2 100 %, not currently breastfeeding.  Constitutional: Somnolent but seems oriented to person, place and time though needs re-direction; no apparent distress  HEENT: normocephalic dry mucus membranes  Respiratory: lungs have crackles to auscultation in both bases; no wheeze  Cardiovascular: regular S1 S2   GI: abdomen soft non tender non distended bowel sounds positive  Skin: no rash, good turgor  Musculoskeletal: mild to moderate bilateral LE edema  Neurologic: extra-ocular muscles intact; moves all four extremities  Psychiatric: markedly flattened affect, speech tangential and rambling at times     DVT Prophylaxis: Pneumatic Compression Devices  Code Status: DNR / DNI as per Dr. Esparza's care note from 6/12/2019; try to confirm with family in AM    Disposition: Expected discharge in several days    Roderick Montes MD    Past Medical History    I have reviewed this patient's medical history and updated it with pertinent information if needed.   Past Medical History:   Diagnosis Date     Anemia      Ankle arthritis      Arthritis      Back pain      Bell's palsy 9/08    left     Breast CA (H) 2004-    left lumpectomy, radiation, -recurrence     Breast cancer (H) 2004    Left breast lumpectomy w LN disection, and radiation therapy     CKD (chronic kidney disease)     stage 3 baseline Cr 1.3     Colon polyps     colonoscopy-9/12-next due in 9/13     Congestive heart failure (H)      COPD (chronic obstructive pulmonary " disease) (H)      Coronary artery disease      DM (diabetes mellitus) (H)      GERD (gastroesophageal reflux disease)      Hyperlipidemia      Hypertension      Lumbar spinal stenosis     use cane     Nicotine dependence      Obesity      Osteoporosis      Other chronic pain      Pacemaker      Permanent atrial fibrillation (H) 8/2008    S/P AV node ablation and pacemaker 10-25-12       Pleural effusion      Pulmonary hypertension (H)      PVD (peripheral vascular disease) (H)      Rectal stenosis      Tobacco abuse     current     Tricuspid regurgitation        Past Surgical History   I have reviewed this patient's surgical history and updated it with pertinent information if needed.  Past Surgical History:   Procedure Laterality Date     ARTHROPLASTY HIP Left 10/20/2018    Procedure: LEFT HIP HERACLIO-ARTHROPLASTY ;  Surgeon: Ish Fish MD;  Location:  OR     ARTHROSCOPY SHOULDER RT/LT  1999, 2004    Bilateral, right then left     BONE MARROW BIOPSY, BONE SPECIMEN, NEEDLE/TROCAR N/A 8/29/2017    Procedure: BIOPSY BONE MARROW;  BONE MARROW BIOPSY;  Surgeon: Marino Cohen MD;  Location: [facility] DEXA, BONE DENSITY, AXIAL SKEL       C MAMMOGRAM, SCREENING  1/2009     COLONOSCOPY N/A 10/7/2016    Procedure: COMBINED COLONOSCOPY, SINGLE OR MULTIPLE BIOPSY/POLYPECTOMY BY BIOPSY;  Surgeon: Cassandra Mccord MD;  Location: [facility]     COLONOSCOPY N/A 2/15/2019    Procedure: COLONOSCOPY;  Surgeon: Juan Pablo Hayden DO;  Location: [facility]     ESOPHAGOSCOPY, GASTROSCOPY, DUODENOSCOPY (EGD), COMBINED N/A 8/10/2017    Procedure: COMBINED ESOPHAGOSCOPY, GASTROSCOPY, DUODENOSCOPY (EGD), BIOPSY SINGLE OR MULTIPLE;  gastroscopy;  Surgeon: Helene Bustamante MD;  Location:  GI     ESOPHAGOSCOPY, GASTROSCOPY, DUODENOSCOPY (EGD), COMBINED N/A 2/15/2019    Procedure: COMBINED ESOPHAGOSCOPY, GASTROSCOPY, DUODENOSCOPY (EGD);  Surgeon: Juan Pablo Hayden DO;  Location: [facility]     H ABLATION AV NODE  10/2012       COLONOSCOPY THRU STOMA, DIAGNOSTIC  ? 2005     IMPLANT PACEMAKER  10/2012    [place] [id number], [id number]     IRRIGATION AND DEBRIDEMENT LOWER EXTREMITY, COMBINED Left 12/21/2018    Procedure: INCISION AND DRAINAGE OF LEFT HIP WITH EXCISION OF BONE FRAGMENT;  Surgeon: Ish Fish MD;  Location:  OR     JOINT REPLACEMTN, KNEE RT/LT      Joint Replacement knee bilateral     LAPAROSCOPIC CHOLECYSTECTOMY WITH CHOLANGIOGRAMS N/A 12/14/2015    Procedure: LAPAROSCOPIC CHOLECYSTECTOMY WITH CHOLANGIOGRAMS;  Surgeon: Ervin Amos MD;  Location:  OR     LUMPECTOMY BREAST      Left-2004     PHACOEMULSIFICATION CLEAR CORNEA WITH STANDARD INTRAOCULAR LENS IMPLANT Left 7/16/2015    Procedure: PHACOEMULSIFICATION CLEAR CORNEA WITH STANDARD INTRAOCULAR LENS IMPLANT;  Surgeon: Sai Obregon MD;  Location:  EC     PHACOEMULSIFICATION CLEAR CORNEA WITH TORIC INTRAOCULAR LENS IMPLANT Right 5/9/2017    Procedure: PHACOEMULSIFICATION CLEAR CORNEA WITH TORIC INTRAOCULAR LENS IMPLANT;  RIGHT EYE PHACOEMULSIFICATION CLEAR CORNEA WITH TORIC INTRAOCULAR LENS IMPLANT ;  Surgeon: Duc Richmond MD;  Location:  EC       Prior to Admission Medications   Prior to Admission Medications   Prescriptions Last Dose Informant Patient Reported? Taking?   ACCU-CHEK SMARTVIEW test strip  Self No No   Sig: USE 1 STRIP BY IN VITRO ROUTE 2 TIMES DAILY OR AS DIRECTED   ACE/ARB/ARNI NOT PRESCRIBED (INTENTIONAL)  Self Yes No   Sig: Please choose reason not prescribed, below   ASPIRIN NOT PRESCRIBED (INTENTIONAL)  Self No No   Sig: Please choose reason not prescribed, below   albuterol (PROAIR HFA, PROVENTIL HFA, VENTOLIN HFA) 108 (90 BASE) MCG/ACT inhaler  Self No No   Sig: Inhale 2 puffs into the lungs every 4 hours as needed for shortness of breath / dyspnea or wheezing   apixaban ANTICOAGULANT (ELIQUIS) 2.5 MG tablet   No No   Sig: Take 1 tablet (2.5 mg) by mouth 2 times daily   bisacodyl (DULCOLAX) 5 MG EC tablet  Self Yes No   Sig:  Take 10 mg by mouth At Bedtime   blood glucose monitoring (NO BRAND SPECIFIED) meter device kit  Self No No   Sig: Accucheck Mariza meter; verified with pharmacist   denosumab (PROLIA) 60 MG/ML SOLN injection  Self No No   Sig: Inject 1 mL (60 mg) Subcutaneous every 6 months   erythromycin (ROMYCIN) 5 MG/GM ophthalmic ointment   No No   Sig: Place 0.5 inches Into the left eye 3 times daily   fluticasone (FLONASE) 50 MCG/ACT nasal spray  Self No No   Sig: Spray 2 sprays into both nostrils daily   Patient taking differently: Spray 2 sprays into both nostrils every evening    glipiZIDE (GLUCOTROL) 5 MG tablet  Self No No   Sig: Take 1 tablet (5 mg) by mouth every morning (before breakfast)   hydrOXYzine (VISTARIL) 25 MG capsule  Self No No   Sig: Take 1 capsule (25 mg) by mouth 2 times daily as needed for itching   hydrocortisone (ANUSOL-HC) 2.5 % cream   No No   Sig: Place rectally 2 times daily as needed for hemorrhoids   hypromellose (ARTIFICIAL TEARS) 0.5 % SOLN ophthalmic solution  Self Yes No   Sig: Place 1 drop into both eyes every hour as needed for dry eyes   lidocaine (LIDODERM) 5 % patch   No No   Sig: APPLY UP TO 3 PATCHES TO PAINFUL AREA AT ONCE FOR 12 HOUR PERIOD. REMOVE AFTER 12 HOURS   metoprolol tartrate (LOPRESSOR) 25 MG tablet  Self No No   Sig: TAKE 1 TABLET BY MOUTH TWICE A DAY   pregabalin (LYRICA) 75 MG capsule   No No   Sig: Take 1 capsule (75 mg) by mouth 2 times daily   ranitidine (ZANTAC) 150 MG tablet  Self No No   Sig: Take 1 tablet (150 mg) by mouth At Bedtime   Patient taking differently: Take 150 mg by mouth daily    simvastatin (ZOCOR) 10 MG tablet  Self No No   Sig: Take 1 tablet (10 mg) by mouth At Bedtime   tiotropium (SPIRIVA) 18 MCG inhaled capsule  Self No No   Sig: Inhale 1 capsule (18 mcg) into the lungs daily   torsemide (DEMADEX) 20 MG tablet   No No   Sig: Take 1 tablet (20 mg) by mouth 2 times daily   traMADol (ULTRAM) 50 MG tablet   No No   Sig: Take 1 tablet (50 mg) by mouth  every 6 hours as needed for severe pain   triamcinolone (KENALOG) 0.1 % external cream   No No   Sig: Apply topically 2 times daily      Facility-Administered Medications: None     Allergies   Allergies   Allergen Reactions     Ambien [Zolpidem Tartrate] Visual Disturbance     Hallucinations and fell out of bed at night.     Penicillins Hives     Definity      Caused a syncopal episode.     Sulfa Drugs Itching     Cymbalta Rash     Fluconazole Rash     Tolerates miconazole suppositories       Social History   I have reviewed this patient's social history and updated it with pertinent information if needed. Helene Gibbs  reports that she quit smoking about 12 months ago. Her smoking use included cigarettes. She has a 11.25 pack-year smoking history. She has never used smokeless tobacco. She reports that she does not drink alcohol or use drugs.    Family History   Family history assessed and, except as above, is non-contributory.    Family History   Problem Relation Age of Onset     Hypertension Mother      Diabetes Mother          at 83 yrs     C.A.D. Father          age 70s     Breast Cancer No family hx of      Colon Cancer No family hx of        Review of Systems   The 10 point Review of Systems is negative other than noted in the HPI or here.     Primary Care Physician   Neisha Esparza    Data   Labs Ordered and Resulted from Time of ED Arrival Up to the Time of Departure from the ED   CBC WITH PLATELETS DIFFERENTIAL - Abnormal; Notable for the following components:       Result Value    RBC Count 3.50 (*)     Hemoglobin 7.8 (*)     Hematocrit 27.7 (*)     MCH 22.3 (*)     MCHC 28.2 (*)     RDW 18.1 (*)     Absolute Neutrophil 8.7 (*)     Absolute Lymphocytes 0.4 (*)     All other components within normal limits   ROUTINE UA WITH MICROSCOPIC - Abnormal; Notable for the following components:    Squamous Epithelial /HPF Urine 2 (*)     Mucous Urine Present (*)     All other components within normal  limits   COMPREHENSIVE METABOLIC PANEL - Abnormal; Notable for the following components:    Potassium 3.3 (*)     Carbon Dioxide 37 (*)     Glucose 143 (*)     Urea Nitrogen 57 (*)     Creatinine 1.92 (*)     GFR Estimate 24 (*)     GFR Estimate If Black 28 (*)     Bilirubin Total 1.5 (*)     Albumin 3.2 (*)     All other components within normal limits   OCCULT BLOOD STOOL - Abnormal; Notable for the following components:    Occult Blood Positive (*)     All other components within normal limits   NT PROBNP INPATIENT - Abnormal; Notable for the following components:    N-Terminal Pro BNP Inpatient 11,810 (*)     All other components within normal limits   COMPREHENSIVE METABOLIC PANEL   LACTIC ACID WHOLE BLOOD   PROCALCITONIN   BLOOD CULTURE   BLOOD CULTURE       Data reviewed today:  I personally reviewed the EKG tracing showing paced rhythm.    Recent Results (from the past 24 hour(s))   XR Chest 2 Views    Narrative    CHEST TWO VIEWS  6/21/2019 2:52 AM     HISTORY: Weakness.    COMPARISON: 5/7/2019.    FINDINGS: Left-sided pacer device. The heart is enlarged.  Moderate-sized right pleural effusion. Left lower lobe air-space  opacity similar to prior. No pneumothorax.      Impression    IMPRESSION: Persistent left lower lobe air-space opacity may represent  continued pneumonia. Moderate-sized right effusion.    MALICK MARTINEZ MD

## 2019-06-21 NOTE — PROGRESS NOTES
RECEIVING UNIT ED HANDOFF REVIEW    ED Nurse Handoff Report was reviewed by: Katie Burt on [address] on June 21, 2019 at 4:22 AM

## 2019-06-21 NOTE — PHARMACY-ADMISSION MEDICATION HISTORY
Admission medication history interview status for the 6/21/2019  admission is complete. See EPIC admission navigator for prior to admission medications     Medication history source reliability:Good    Actions taken by pharmacist (provider contacted, etc):None     Additional medication history information not noted on PTA med list :None    Medication reconciliation/reorder completed by provider prior to medication history? No    Time spent in this activity: 15min    Prior to Admission medications    Medication Sig Last Dose Taking? Auth Provider   albuterol (PROAIR HFA, PROVENTIL HFA, VENTOLIN HFA) 108 (90 BASE) MCG/ACT inhaler Inhale 2 puffs into the lungs every 4 hours as needed for shortness of breath / dyspnea or wheezing  Yes Neisha Esparza MD   apixaban ANTICOAGULANT (ELIQUIS) 2.5 MG tablet Take 1 tablet (2.5 mg) by mouth 2 times daily 6/20/2019 at Unknown time Yes Angelica Grady APRN CNP   bisacodyl (DULCOLAX) 5 MG EC tablet Take 10 mg by mouth At Bedtime 6/20/2019 at Unknown time Yes Unknown, Entered By History   denosumab (PROLIA) 60 MG/ML SOLN injection Inject 1 mL (60 mg) Subcutaneous every 6 months does not remeber when she had it last time. Yes Hector Moran MD   erythromycin (ROMYCIN) 5 MG/GM ophthalmic ointment Place 0.5 inches Into the left eye 3 times daily 6/20/2019 at Unknown time Yes Neisha Esparza MD   fluticasone (FLONASE) 50 MCG/ACT nasal spray Spray 2 sprays into both nostrils daily  Patient taking differently: Spray 2 sprays into both nostrils every evening  6/20/2019 at Unknown time Yes Neisha Esparza MD   glipiZIDE (GLUCOTROL) 5 MG tablet Take 1 tablet (5 mg) by mouth every morning (before breakfast) 6/20/2019 at Unknown time Yes Neisha Esparza MD   hydrocortisone (ANUSOL-HC) 2.5 % cream Place rectally 2 times daily as needed for hemorrhoids  Yes Neisha Esparza MD   hydrOXYzine (VISTARIL) 25 MG capsule Take 1 capsule (25 mg) by mouth 2 times daily as needed for itching   Yes Neisha Esparza MD   hypromellose (ARTIFICIAL TEARS) 0.5 % SOLN ophthalmic solution Place 1 drop into both eyes every hour as needed for dry eyes  Yes Unknown, Entered By History   lidocaine (LIDODERM) 5 % patch APPLY UP TO 3 PATCHES TO PAINFUL AREA AT ONCE FOR 12 HOUR PERIOD. REMOVE AFTER 12 HOURS does not have a patch on Yes Neisha Esparza MD   metoprolol tartrate (LOPRESSOR) 25 MG tablet TAKE 1 TABLET BY MOUTH TWICE A DAY 6/20/2019 at Unknown time Yes Neisha Esparza MD   pregabalin (LYRICA) 75 MG capsule Take 1 capsule (75 mg) by mouth 2 times daily 6/20/2019 at Unknown time Yes Wale Jeong MD   ranitidine (ZANTAC) 150 MG tablet Take 1 tablet (150 mg) by mouth At Bedtime  Patient taking differently: Take 150 mg by mouth daily  6/20/2019 at Unknown time Yes Neisha Esparza MD   simvastatin (ZOCOR) 10 MG tablet Take 1 tablet (10 mg) by mouth At Bedtime 6/20/2019 at Unknown time Yes Neisha Esparza MD   tiotropium (SPIRIVA) 18 MCG inhaled capsule Inhale 1 capsule (18 mcg) into the lungs daily  Yes Neisha Esparza MD   torsemide (DEMADEX) 20 MG tablet Take 1 tablet (20 mg) by mouth 2 times daily 6/20/2019 at Unknown time Yes Haase, Tonya Lynn, APRN CNP   traMADol (ULTRAM) 50 MG tablet Take 1 tablet (50 mg) by mouth every 6 hours as needed for severe pain  Yes Wale Jeong MD   triamcinolone (KENALOG) 0.1 % external cream Apply topically 2 times daily as needed for irritation prn Yes Unknown, Entered By History   ACCU-CHEK SMARTVIEW test strip USE 1 STRIP BY IN VITRO ROUTE 2 TIMES DAILY OR AS DIRECTED   Neisha Esparza MD   ACE/ARB/ARNI NOT PRESCRIBED (INTENTIONAL) Please choose reason not prescribed, below   Neisha Esparza MD   ASPIRIN NOT PRESCRIBED (INTENTIONAL) Please choose reason not prescribed, below   Neisha Esparza MD   blood glucose monitoring (NO BRAND SPECIFIED) meter device kit Accucheck Mariza meter; verified with pharmacist   Neisha Esparza MD

## 2019-06-21 NOTE — PROGRESS NOTES
Ongoing hypotension. HGB this AM down to 7. I discussed risks and benefits of blood transfusion with her and she agrees to transfusion one unit packed red cells this AM, extremely slowly due to history of severe right sided CHF. NPO ordered. GI consulted for further evaluation.

## 2019-06-21 NOTE — ED TRIAGE NOTES
Patient arrived to ED via EMS with reports of generalized weakness. Pt reported that around 9pm last night she was too weak to walk with her walker and was unable to get out of a chair. Pt denies any other complaints.

## 2019-06-22 NOTE — PLAN OF CARE
Patient is A&Ox4, but forgetful. Gave melatonin for sleep. VSS ex soft BPs. On 2L oxygen via NC. Telemetry read 100% V-Paced. Denies nausea. Up with lift/assist of 2-3. Turned and repositioned every 2 hours. Blanchable redness to coccyx and low spine. BLE edema +2. C/o back pain, gave Tylenol x1. C/o constipation, gave suppository x1. Calls appropriately.

## 2019-06-22 NOTE — PROGRESS NOTES
[facility]    Medicine Progress Note - Hospitalist Service       Date of Admission:  6/21/2019  Assessment & Plan     84 year old woman with extensive past medical history that is most notable for CAD; chronic diastolic CHF and Tricuspid regurgitation; chronic respiratory failure due to COPD, kyphosis and restrictive lung disease, and severe pulmonary hypertension; chronic atrial fibrillation on Eliquis; recurrent lower GI bleeding and chronic anemia; CKD Stage 4; and Type 2 Diabetes mellitus; and GERD; who presented with progressive weakness and dyspnea and is found to have suspected ongoing or recurrent left lower lobe pneumonia as well as right sided pleural effusion, and acute on chronic anemia.     Possible pneumonia:   -She was hospitalized 5/2019 for weakness and dyspnea and treated for acute diastolic CHF exacerbation. She was diuresed. CXR at that time showed a right sided pleural effusion as well as left lung base infiltrate and she received a course of Levaquin, and was eventually discharged to TCU, and from there it seems she has been discharged home.   See my note dated 6/12 as I am her PCP  documents care goal discussions including recommendations for consideration of hospice given ongoing progressive weakness and extensive medical co-morbidities; she declined that     Now she presents for further weakness and dyspnea and seems unable to get up off the toilet on evening of admission. She appeared hypervolemic on exam though she could be intra-vascularly volume depleted. She had fever 100.6 with initial BP 92/62, improved to 104/55 after 500 ml IV fluid was given in the ED. CXR repeated and shows ongoing moderate sized effusion and left sided infiltrate.    Inpatient. Continue Cefepime for now (she is at risk for Pseudomonas).   -Zithromax was added yesterday  Follow up blood and sputum cultures and check Pro-calcitonin.   She had a chest CT which showed:  1. New small to moderate right  pleural effusion with associated  compressive atelectasis and/or infiltrate.  2. Slight interval enlargement in irregular nodular density in the  right upper lobe since the comparison study   diagnostic/therapeutic thoracentesis if needed  Holding DOAC for now .      -- Telemetry, serial enzymes, repeat TTE ordered  Echo results are pending    Per notes she has some change in her mental status which could be due to her pneumonia  Today her mental status is improving and she is back to her baseline  I ordered her tramadol on as-needed basis as she has a chronic pain  Hold if she is oversedated or if  there is change in her mental status      She was seen by palliative care please refer to their note:   She is DNR/DNI  He is planning to go to long-term care facility when discharge with 24  Hour care but not ready for hospice care yet      CAD and chronic diastolic CHF:   She is followed closely by [facility] for multiple chronic issues.  - Most recent TTE 2/2019 showed RV volume overload and decreased RV function with preserved LVEF, severe TR and severe pulmonary hypertension. JOAN 5/6/2019 done in part for possible upcoming Watchman procedure showed similar findings.   Her cardiac medications were on hold due to soft BP  Now she is improving  We can resume metoprolol with hold parameters  Her diuretics were on hold but we can resume Demadex at lower dose of twice a day prior to admission daily starting tomorrow(he was on  Hold diuretic for systolic blood pressure less than 100  -Patient was seen by cardiology please see the note of Dr. JERROD Sparrow on chronic anemia:   She has a history of colonic angioid ectasias causing recurrent GI bleeds.   Occult blood is positive       -- Hold Eliquis for now; serial HGB; consult GI if worsening    Chronic atrial fibrillation:   -Status post ablation and pacemaker placement;   -she is due to have Watchman done soon but this has already reportedly had to be postponed for  acute illness last month and may need to be postponed further depending on her hospital course this stay.    -- As above     COPD and restrictive lung disease:   -She uses supplemental O2 at baseline   -O2 sats currently 96% on 3 L NC.   -Nebulizer are ordered  Continue-home inhalers      Stage 4 CKD:   Monitor closely     Diabetes type II:  Hold oral medications  ISS insulin ordered while hospitalized.   Lab Results   Component Value Date    A1C 6.3 04/09/2019    A1C 5.5 10/19/2018    A1C 6.9 07/21/2018    A1C 6.8 03/22/2018    A1C 6.6 01/04/2018          Hypokalemia:   -Replaced    Per nursing staff she is constipated and declining to take MiraLAX and Senokot  Suppository did not work  She is very insistent about getting milk of magnesia as per her report that is the only thing which works  I am hesitant to order it due to her chronic kidney disease but she was so insistent that we ordered 1 dose    Diet: Low Saturated Fat Na <2400 mg    DVT Prophylaxis: Pneumatic Compression Devices  Montano Catheter: not present  Code Status: DNR/DNI      Disposition Plan   Expected discharge: 2 - 3 days, recommended to Long-term care facility once safe disposition plan/ TCU bed available.  Entered: Neisha Esparza MD 06/22/2019, 2:53 PM       The patient's care was discussed with the Bedside Nurse and Patient.    Time spent greater than 35 minutes    Neisha Esparza MD  Hospitalist Service  [facility]    ______________________________________________________________________    Interval History   Patient has generalized weakness  Her mobility is very limited due to generalized weakness  Overall patient seen in phase her condition remains same  She is planning to go to long-term care facility after discharge from the hospital    Data reviewed today: I reviewed all medications, new labs and imaging results over the last 24 hours. I personally reviewed .    Physical Exam   Vital Signs: Temp: 95.5  F (35.3  C)  Temp src: Oral BP: 122/54   Heart Rate: 77 Resp: 22 SpO2: 94 % O2 Device: Nasal cannula Oxygen Delivery: 2 LPM  Weight: 166 lbs 0 oz  Is nice and pleasant  She is not in any kind of respiratory distress when resting  Her mobility is very limited  She needs assistance getting up and getting to the bed  Pupils are equal and reactive  CVS exam S1 and S2 no S3  Chest fair entry on both side of lungs with decreased breath sounds on the right lung base        Data   Recent Labs   Lab 06/22/19  1157 06/22/19  0603 06/22/19  0046 06/21/19  1703 06/21/19  1237 06/21/19  0852 06/21/19  0511 06/21/19  0216 06/21/19  0149   WBC  --   --   --   --   --   --  8.6  --  10.4   HGB 8.8* 8.7* 8.0* 8.6* 9.1*  --  7.0*  --  7.8*   MCV  --   --   --   --   --   --  80  --  79   PLT  --   --   --   --   --   --  141*  --  152   NA  --  142  --   --   --   --  145* 144 Canceled, Test credited, specimen discarded   POTASSIUM  --  3.7  --  3.5  --   --  3.3* 3.3* Canceled, Test credited, specimen discarded   CHLORIDE  --  102  --   --   --   --  104 101 Canceled, Test credited, specimen discarded   CO2  --  35*  --   --   --   --  41* 37* Canceled, Test credited, specimen discarded   BUN  --  63*  --   --   --   --  55* 57* Canceled, Test credited, specimen discarded   CR  --  2.28*  --   --   --   --  2.02* 1.92* Canceled, Test credited, specimen discarded   ANIONGAP  --  5  --   --   --   --  <1* 6 Canceled, Test credited, specimen discarded   GERARD  --  8.4*  --   --   --   --  8.1* 8.6 Canceled, Test credited, specimen discarded   GLC  --  109*  --   --   --   --  149* 143* Canceled, Test credited, specimen discarded   ALBUMIN  --   --   --   --   --   --   --  3.2* Canceled, Test credited, specimen discarded   PROTTOTAL  --   --   --   --   --   --   --  7.1 Canceled, Test credited, specimen discarded   BILITOTAL  --   --   --   --   --   --   --  1.5* Canceled, Test credited, specimen discarded   ALKPHOS  --   --   --   --   --   --    --  58 Canceled, Test credited, specimen discarded   ALT  --   --   --   --   --   --   --  14 Canceled, Test credited, specimen discarded   AST  --   --   --   --   --   --   --  9 Canceled, Test credited, specimen discarded   TROPI  --   --   --   --  0.021 <0.015 0.022  --   --

## 2019-06-22 NOTE — PLAN OF CARE
Pt A/O x4, VSS, tele: 100% V-paced. ECHO today (see results) c/o back pain-Tylenol given. RLL clear, LL coarse. Unable to wean o2 requires 2L NC to keep sats >90%. Low fat diet-up for meals. BG management.  A2+gb+w to use bathroom- need encouragement to ambulate. Hgb 8.8 (trending up) Good UOP. No BM, MOM given. Encourage repo Q 2, blanchable coccyx. Plan: Discharge to LTC 1-2 days once placement found.

## 2019-06-22 NOTE — PLAN OF CARE
A&Ox4. On 2 L NC baseline, Hypotensive, asymptomatic, bedrest T&q2hrs, Ax2. TELE 100% V paced. LS coarse, no cough.  Reports chronic low back pain, repositioning helps and tylenol. Hgb 8.6. Blanchable redness to coccyx and spine. Large bruise to R hip/bottom from fall pta. Neuros intact except for weakness, baseline BLE neuropathy, and generalized weakness. Generalized trace edema. Chest CT completed. Cardiac diet. Continue to monitor.

## 2019-06-23 NOTE — PROGRESS NOTES
"   06/23/19 0800   Quick Adds   Type of Visit Initial PT Evaluation   Living Environment   Lives With facility resident   Living Arrangements assisted living   Home Accessibility no concerns   Transportation Anticipated family or friend will provide  (\"Ed\")   Living Environment Comment \"I can't live alone anymore\"; states she needs \"a nursing home where there is around the clock care\"   Self-Care   Usual Activity Tolerance moderate   Current Activity Tolerance fair   Regular Exercise Yes   Activity/Exercise Type walking;strength training  (Home PT/OT)   Exercise Amount/Frequency 2 times/wk   Equipment Currently Used at Home walker, rolling;shower chair;raised toilet;grab bar, toilet;grab bar, tub/shower   Activity/Exercise/Self-Care Comment States most recently was unable to get out of the chair . Walks to dining room with 4WW until this past month started having them delivered; Home RN/HHA/Home PT/Home OT; shower chair; assist with dressing   Functional Level Prior   Ambulation 1-->assistive equipment   Transferring 1-->assistive equipment   Toileting 1-->assistive equipment   Bathing 3-->assistive equipment and person   Fall history within last six months yes   Number of times patient has fallen within last six months 1   Which of the above functional risks had a recent onset or change? transferring;toileting;ambulation   Prior Functional Level Comment Reports she recently has home O2   General Information   Onset of Illness/Injury or Date of Surgery - Date 06/21/19   Referring Physician Abhijit Duran, DO   Patient/Family Goals Statement \"I think I need to find a new place to live\"   Pertinent History of Current Problem (include personal factors and/or comorbidities that impact the POC) 84 year old woman with extensive past medical history that is most notable for CAD; chronic diastolic CHF and Tricuspid regurgitation; chronic respiratory failure due to COPD, kyphosis and restrictive lung disease, and " severe pulmonary hypertension; chronic atrial fibrillation on Eliquis; recurrent lower GI bleeding and chronic anemia; CKD Stage 4; and Type 2 Diabetes mellitus; and GERD; who presented with progressive weakness and dyspnea and is found to have suspected ongoing or recurrent left lower lobe pneumonia as well as right sided pleural effusion, and acute on chronic anemia.   Precautions/Limitations fall precautions;oxygen therapy device and L/min  (2L O2 via NC)   General Observations Pt sitting up in chair eating breakfast, on 2L O2 via NC   General Info Comments Activity: up with assist   Cognitive Status Examination   Orientation orientation to person, place and time   Level of Consciousness alert   Follows Commands and Answers Questions 100% of the time;able to follow multistep instructions   Personal Safety and Judgment intact   Pain Assessment   Patient Currently in Pain No   Integumentary/Edema   Integumentary/Edema Comments 2+ BLE edema   Posture    Posture Forward head position;Protracted shoulders   Range of Motion (ROM)   ROM Comment BLE WNL for funcitonal transfers   Strength   Strength Comments appears functionally weak but at least 3/5 wtih mobility   Bed Mobility   Bed Mobility Comments modA supine>sit   Transfer Skills   Transfer Comments Fara sit>stand to FWW   Gait   Gait Comments Fara for balance for bedside gait with FWW, short shuffled steps, forward flexed posture and unsteady on feet   Balance   Balance Comments Good sitting balance, unsteady on feet with use of AD   Sensory Examination   Sensory Perception Comments baseline neuropathy per pt   General Therapy Interventions   Planned Therapy Interventions balance training;bed mobility training;gait training;neuromuscular re-education;strengthening;transfer training;progressive activity/exercise;home program guidelines   Clinical Impression   Criteria for Skilled Therapeutic Intervention yes, treatment indicated   PT Diagnosis Difficulty ambulating  "  Influenced by the following impairments Weakness, decreased activity tolerance, impaired balance, fatigue and SOB   Functional limitations due to impairments Decreased safety and IND with functional transfers and ambulation   Clinical Presentation Stable/Uncomplicated   Clinical Decision Making (Complexity) Low complexity   Therapy Frequency 5x/week   Predicted Duration of Therapy Intervention (days/wks) 5 days   Anticipated Discharge Disposition Transitional Care Facility   Risk & Benefits of therapy have been explained Yes   Patient, Family & other staff in agreement with plan of care Yes   [facility] \"6 Clicks\"   2016, Trustees of [facility], under license to [employer].  All rights reserved.   6 Clicks Short Forms Basic Mobility Inpatient Short Form   [facility] AM-PAC  \"6 Clicks\" V.2 Basic Mobility Inpatient Short Form   1. Turning from your back to your side while in a flat bed without using bedrails? 3 - A Little   2. Moving from lying on your back to sitting on the side of a flat bed without using bedrails? 2 - A Lot   3. Moving to and from a bed to a chair (including a wheelchair)? 3 - A Little   4. Standing up from a chair using your arms (e.g., wheelchair, or bedside chair)? 3 - A Little   5. To walk in hospital room? 3 - A Little   6. Climbing 3-5 steps with a railing? 2 - A Lot   Basic Mobility Raw Score (Score out of 24.Lower scores equate to lower levels of function) 16   Total Evaluation Time   Total Evaluation Time (Minutes) 10     "

## 2019-06-23 NOTE — PLAN OF CARE
Pt A/O x4, VSS able to wean to 1L, may requires 2 w/ exertion. Low fat diet-up im chair for meals. BG management with sliding scale insulin. Blanchable coccyx, encourage weight shift. Encourage mobility, requires A1+w+GB. Up to BR with adequate UOP. No BM yet, sup x1 and MOM x1 (6/22). Plan: Discharge to TCU once placement found.

## 2019-06-23 NOTE — PLAN OF CARE
Pt on 2L oxygen. Up with assist of 1 and walker to BSC, needs encouragement to get out of bed. Bruising noted on bottom from previous fall. Slightly hypotensive, MD aware. Tele 100% V paced.

## 2019-06-23 NOTE — PROGRESS NOTES
Care Transition Initial Assessment -      Met with: Patient    Active Problems:    Pneumonia, unspecified organism       DATA  Lives With: facility resident   Living Arrangements: assisted living     Pt stated that she currently lives at [facility] in [place]. She states that she likes living there, but feels that she may need more care. She said that the other night she felt very weak, called for help and the staff came to her assistance, but she said that in that moment she was overcome with the thought that she needs more help than the assisted living is able to provide. Pt stated that her niece Enedelia is helping her with her care and they have had discussions about finding a place that provides more care and pt stated that she may be nearing that time. Pt further states that she has been to [facility] recently in the past and said that rehab went well and she felt like she gained strength and she is willing to do rehab again if recommended by therapy which it is. Pt.'s first choice continues to be [facility] and she would prefer a private room and is aware of the private room fee and is agreeable to it. Pt requested that this writer call her niece Enedelia to relay the information we discussed and to also talk with her about having a hospice informational meeting in the future to get more information.   Transportation Anticipated: did not discuss with pt, but will when this writer talks with her niece.  ASSESSMENT  Cognitive Status:  awake, alert and oriented  Concerns to be addressed: Pt is agreeable to medical teams recommendations. Pt is also aware that her care needs may be increasing.   PLAN  Patient given options and choices for discharge: Yes   Patient/family is agreeable to the plan?  Yes:   Patient Goals and Preferences: Pt is agreeable to [facility] recommendations  Patient anticipates discharging to:  [facility]  This writer will contact pt.'s Niece Enedelia to continue discussing discharge plans.    Addendum:  This  "writer talked with pt.'s niece Enedelia regarding above topics and discharge planning. Enedelia stated that she is in agreement with pt discharging to TCU and is already working on finding a LTC bed. Enedelia stated that she is planning to look into [facility], [facility] and if there is not availability at these facilities then Enedelia will contact \"[facility]\" to further assist with finding a LTC facility with availability.  Also discussed portable oxygen with Enedelia and this writer found out that pt.'s oxygen is through [employer] and she has the portable concentrator at her AL and Enedelia thought she would likely bring that to [facility] for pt to use for transport. If not this writer talked to Tati with [facility] and they will drop off a transport tank if needed, we will just need to call to notify them. Enedelia stated if other TCU options are needed for pt than she would use [facility] and [facility] as her 2nd & 3rd choices. Pt also told me when I stopped in to update her about Enedelia and I's conversation that she would prefer to use a transport company called [employer] ([phone]) and that [facility] would be her second choice if it doesn't work for [employer] to transport on day of discharge. She understands that both companies are private pay. Also gave Enedelia a heads up that pt had brought up Hospice and wanting more information, but that she isn't quite ready to make this step yet, but is thinking about it for the future. Enedelia said that she is glad pt is more open to the idea of Hospice and will work on this with pt.'s medical team in the future as pt is more settled.          "

## 2019-06-23 NOTE — PROGRESS NOTES
[facility]    Medicine Progress Note - Hospitalist Service       Date of Admission:  6/21/2019  Assessment & Plan     84 year old woman with extensive past medical history that is most notable for CAD; chronic diastolic CHF and Tricuspid regurgitation; chronic respiratory failure due to COPD, kyphosis and restrictive lung disease, and severe pulmonary hypertension; chronic atrial fibrillation on Eliquis; recurrent lower GI bleeding and chronic anemia; CKD Stage 4; and Type 2 Diabetes mellitus; and GERD; who presented with progressive weakness and dyspnea and is found to have suspected ongoing or recurrent left lower lobe pneumonia as well as right sided pleural effusion, and acute on chronic anemia.     Possible pneumonia HCAP  Right sided pleural effusion:   She was hospitalized 5/2019 for weakness and dyspnea and treated for acute diastolic CHF exacerbation. She was diuresed. CXR at that time showed a right sided pleural effusion as well as left lung base infiltrate and she received a course of Levaquin, and was eventually discharged to TCU, and from there it seems she has been discharged home.   Declined hospice after discussion with PCP  Currently p/w further weakness and dyspnea and seems unable to get up off the toilet on evening of admission.  CXR with left sided infiltrate and moderate right sided plueral effusion  Started on cefepime and added on azithromycin  chest CT which showed:  1. New small to moderate right pleural effusion with associated  compressive atelectasis and/or infiltrate.  2. Slight interval enlargement in irregular nodular density in the  right upper lobe since the comparison study (1.2 cm to 1.4 cm)  TTE completed which revealed no major changes other than preserved EF and failing RV, PHTN, and TR.  Plan  - continue cefepime and azithromycin and follow cultures, NGTD  - given her right sided pleural effusion, enlarging lung nodule, and her smoking history, we discussed  the role of thoracentesis: she declined this   - per meeting with palliative, she desires restorative cares, however she desires to limit her time in hospital, which given her medical issues does not really fit together      CAD and chronic diastolic CHF:   She is followed closely by [facility] for multiple chronic issues.  Most recent TTE 2/2019 showed RV volume overload and decreased RV function with preserved LVEF, severe TR and severe pulmonary hypertension. JOAN 5/6/2019 done in part for possible upcoming Watchman procedure showed similar findings.   Her cardiac medications were on hold due to soft BP  Cardiology was consulted  Plan  - continue metoprolol with hold parameters  - her torsemide was cut in half to 20 mg   -Patient was seen by cardiology please see the note of Dr. JERROD Sparrow on chronic iron def and chronic blood loss anemia:   She has a history of colonic angioid ectasias causing recurrent GI bleeds.   Occult blood is positive    Evaluated by GI, who did not recommend further endoscopies  - recheck CBC daily  - she is also declining Fe infusions    Chronic atrial fibrillation:   -Status post ablation and pacemaker placement;   -she is due to have Watchman done soon but this has already reportedly had to be postponed for acute illness last month and may need to be postponed further depending on her hospital course this stay.    -- As above     COPD and restrictive lung disease:   -She uses supplemental O2 at baseline   -Nebulizer are ordered  Continue-home inhalers      Stage 4 CKD:   Monitor closely     Diabetes type II:  Hold oral medications  ISS insulin ordered while hospitalized.   Lab Results   Component Value Date    A1C 6.3 04/09/2019    A1C 5.5 10/19/2018    A1C 6.9 07/21/2018    A1C 6.8 03/22/2018    A1C 6.6 01/04/2018          Hypokalemia:   -Replaced        Diet: Low Saturated Fat Na <2400 mg    DVT Prophylaxis: Pneumatic Compression Devices  Montano Catheter: not present  Code  Status: DNR/DNI      Disposition Plan   Expected discharge: 2 - 3 days, recommended to Long-term care facility once safe disposition plan/ TCU bed available.  Entered: Abhijit Duran DO 06/23/2019, 7:27 AM       The patient's care was discussed with the Bedside Nurse and Patient.    Time spent greater than 35 minutes    Abhijit Duran DO  Hospitalist Service  [facility]    ______________________________________________________________________    Interval History   shotness of breath at baseline  Declines Fe infusions and thoracentesis.  No chest pain, nausea, vomiting, or diarrhea  Desires nursing home care on discharge    Data reviewed today: I reviewed all medications, new labs and imaging results over the last 24 hours. I personally reviewed .    Physical Exam   Vital Signs: Temp: 96.3  F (35.7  C) Temp src: Axillary BP: 95/45   Heart Rate: 70 Resp: 18 SpO2: 93 % O2 Device: Nasal cannula with humidification Oxygen Delivery: 2 LPM  Weight: 177 lbs 1.6 oz  Is nice and pleasant  She is not in any kind of respiratory distress when resting  Her mobility is very limited  She needs assistance getting up and getting to the bed  Pupils are equal and reactive  CVS exam S1 and S2 no S3  Chest fair entry on both side of lungs with decreased breath sounds on the right lung base        Data   Recent Labs   Lab 06/23/19  0645 06/22/19  1157 06/22/19  0603  06/21/19  1703 06/21/19  1237 06/21/19  0852 06/21/19  0511 06/21/19  0216 06/21/19  0149   WBC  --   --   --   --   --   --   --  8.6  --  10.4   HGB 8.1* 8.8* 8.7*   < > 8.6* 9.1*  --  7.0*  --  7.8*   MCV  --   --   --   --   --   --   --  80  --  79   PLT  --   --   --   --   --   --   --  141*  --  152   NA  --   --  142  --   --   --   --  145* 144 Canceled, Test credited, specimen discarded   POTASSIUM  --   --  3.7  --  3.5  --   --  3.3* 3.3* Canceled, Test credited, specimen discarded   CHLORIDE  --   --  102  --   --   --   --  104 101  Canceled, Test credited, specimen discarded   CO2  --   --  35*  --   --   --   --  41* 37* Canceled, Test credited, specimen discarded   BUN  --   --  63*  --   --   --   --  55* 57* Canceled, Test credited, specimen discarded   CR  --   --  2.28*  --   --   --   --  2.02* 1.92* Canceled, Test credited, specimen discarded   ANIONGAP  --   --  5  --   --   --   --  <1* 6 Canceled, Test credited, specimen discarded   GERARD  --   --  8.4*  --   --   --   --  8.1* 8.6 Canceled, Test credited, specimen discarded   GLC  --   --  109*  --   --   --   --  149* 143* Canceled, Test credited, specimen discarded   ALBUMIN  --   --   --   --   --   --   --   --  3.2* Canceled, Test credited, specimen discarded   PROTTOTAL  --   --   --   --   --   --   --   --  7.1 Canceled, Test credited, specimen discarded   BILITOTAL  --   --   --   --   --   --   --   --  1.5* Canceled, Test credited, specimen discarded   ALKPHOS  --   --   --   --   --   --   --   --  58 Canceled, Test credited, specimen discarded   ALT  --   --   --   --   --   --   --   --  14 Canceled, Test credited, specimen discarded   AST  --   --   --   --   --   --   --   --  9 Canceled, Test credited, specimen discarded   TROPI  --   --   --   --   --  0.021 <0.015 0.022  --   --     < > = values in this interval not displayed.

## 2019-06-23 NOTE — PROGRESS NOTES
06/23/19 0948   Quick Adds   Type of Visit Initial Occupational Therapy Evaluation   Living Environment   Living Arrangements assisted living   Home Accessibility no concerns   Self-Care   Equipment Currently Used at Home walker, rolling;shower chair;raised toilet;grab bar, toilet;grab bar, tub/shower   Activity/Exercise/Self-Care Comment States most recently was unable to get out of the chair . Walks to dining room with 4WW until this past month started having them delivered; Home RN/HHA/Home PT/Home OT; shower chair; assist with dressing   Functional Level   Ambulation 1-->assistive equipment   Transferring 1-->assistive equipment   Toileting 1-->assistive equipment   Bathing 3-->assistive equipment and person   Dressing 2-->assistive person   Eating 0-->independent   Fall history within last six months yes   Number of times patient has fallen within last six months 1   Prior Functional Level Comment Reports she recently has home O2   General Information   Onset of Illness/Injury or Date of Surgery - Date 06/21/19   Referring Physician Renee   Patient/Family Goals Statement None stated.    Additional Occupational Profile Info/Pertinent History of Current Problem Admitted with weakness, dyspnea. Dx with PNA (recurrent) and R sided pl effusion.   Precautions/Limitations fall precautions;oxygen therapy device and L/min  (2L)   Cognitive Status Examination   Orientation orientation to person, place and time   Level of Consciousness alert   Follows Commands (Cognition) WNL   Cognitive Comment Will monitor cognition and screen as appropriate. Patient appropriate during session.   Visual Perception   Visual Perception Wears glasses   Pain Assessment   Patient Currently in Pain No   Range of Motion (ROM)   ROM Comment R shoulder impaired due to RC tear, internal rotation intact, flexion to ~40*. LARS WNL.    Mobility   Bed Mobility Comments SB A to/from EOB.   Transfer Skill: Sit to Stand   Level of [place]:  Sit/Stand stand-by assist   Transfer Skill: Toilet Transfer   Level of [place]: Toilet   (reports just getting back from toilet, not needing A)   Balance   Balance Comments CG A in stance for balance, forward flexed posture.   Upper Body Dressing   Physical Assist/Nonphysical Assist: Dress Upper Body set-up required   Lower Body Dressing   Level of [place]: Dress Lower Body moderate assist (50% patients effort)   Eating/Self Feeding   Level of [place]: Eating independent   Activities of Daily Living Analysis   Impairments Contributing to Impaired Activities of Daily Living strength decreased  (decreased activity tolerance)   General Therapy Interventions   Planned Therapy Interventions ADL retraining;transfer training   Clinical Impression   Criteria for Skilled Therapeutic Interventions Met yes, treatment indicated   OT Diagnosis Decreased ADL   Influenced by the following impairments generalized weakness, decreased activity tolerance   Assessment of Occupational Performance 3-5 Performance Deficits   Identified Performance Deficits dressing, bathing, toileting, grooming, transfers   Clinical Decision Making (Complexity) Moderate complexity   Therapy Frequency Daily   Predicted Duration of Therapy Intervention (days/wks) 3 days   Anticipated Discharge Disposition Transitional Care Facility   Risks and Benefits of Treatment have been explained. Yes   Patient, Family & other staff in agreement with plan of care Yes   Total Evaluation Time   Total Evaluation Time (Minutes) 15

## 2019-06-23 NOTE — PLAN OF CARE
Discharge Planner OT   Patient plan for discharge: TCU.   Current status: Eval complete and treatment initiated. Patient needing SB A for bed mobility for ADL, MOD A with LE dressing, setup with upper body dressing. SB A to stand and take steps along EOB with walker. Maintaining sats >90% during ADL on 2L. Nurse in to decrease to 1L.   Barriers to return to prior living situation: Level of A, decreased activity tolerance.   Recommendations for discharge: TCU.  Rationale for recommendations: Patient not at her baseline and appears to be needing more A than jail can provide.        Entered by: Tati Thompson 06/23/2019 10:03 AM

## 2019-06-24 NOTE — PROVIDER NOTIFICATION
MD Notification    Notified Person: MD    Notified Person Name: Dr. Vigil    Notification Date/Time: 6/23/19 at 2200    Notification Interaction: phone    Purpose of Notification: Pt c/o constipation. Requesting enema    Orders Received: Orders for tap water enema    Comments:

## 2019-06-24 NOTE — PLAN OF CARE
Discharge Planner OT   Patient plan for discharge: TCU; plans to find LTC setting with more care available as she has been struggling at [facility].  Current status: Completed bed mobility with Min A and cues. Transfers bed to chair with Min A. Increased time required for all activity, as she seems quite fatigued and needs to take rest breaks. O2 95% on 2 LPM. Reports just started using supplemental O2 at home about 1 month ago. Pt able to wash and dry her face, front, tops of legs and put on deoderant. Required Max A to wash her lower legs, back.  Barriers to return to prior living situation: Reports she has been needing increased level of assist lately, more than [facility] is able to provide.  Recommendations for discharge: TCU with patient planning to move to LTC unit.  Rationale for recommendations: Patient is below baseline with recurrent pneumonia and requires assist for bathing, dressing, toileting and basic mobility. She also reports frequent hospital stays and is hoping that a move to a unit with increased care will help keep her out of the hospital.       Entered by: Yoli Ramesh 06/24/2019 9:25 AM

## 2019-06-24 NOTE — PLAN OF CARE
Pt forgetful overnight, did not sleep well. Was down to 1L oxygen, but needed to go back up to 2L overnight. C/o constipation during the evening. Finally able to have a BM after suppository and tap water emena given. Pt had 2 large loose BM's. Up with assist of 1, GB and walker. BP low, evening dose of metoprolol held. Tele 100% V paced.

## 2019-06-24 NOTE — PROGRESS NOTES
[facility]    Medicine Progress Note - Hospitalist Service       Date of Admission:  6/21/2019  Assessment & Plan    84 year old woman with extensive past medical history that is most notable for CAD; chronic diastolic CHF and Tricuspid regurgitation; chronic respiratory failure due to COPD, kyphosis and restrictive lung disease, and severe pulmonary hypertension; chronic atrial fibrillation on Eliquis; recurrent lower GI bleeding and chronic anemia; CKD Stage 4; and Type 2 Diabetes mellitus; and GERD; who presented with progressive weakness and dyspnea and is found to have suspected ongoing or recurrent left lower lobe pneumonia as well as right sided pleural effusion, and acute on chronic anemia.     Possible pneumonia HCAP  Right sided pleural effusion:   She was hospitalized 5/2019 for weakness and dyspnea and treated for acute diastolic CHF exacerbation. She was diuresed. CXR at that time showed a right sided pleural effusion as well as left lung base infiltrate and she received a course of Levaquin, and was eventually discharged to TCU, and from there it seems she has been discharged home.   Declined hospice after discussion with PCP  Currently p/w further weakness and dyspnea and seems unable to get up off the toilet on evening of admission.  CXR with left sided infiltrate and moderate right sided plueral effusion  Started on cefepime and added on azithromycin  chest CT which showed:  1. New small to moderate right pleural effusion with associated  compressive atelectasis and/or infiltrate.  2. Slight interval enlargement in irregular nodular density in the  right upper lobe since the comparison study (1.2 cm to 1.4 cm)  TTE completed which revealed no major changes other than preserved EF and failing RV, PHTN, and TR.  Plan  - continue cefepime and azithromycin and follow cultures, NGTD  - given her right sided pleural effusion, enlarging lung nodule, and her smoking history, we discussed  the role of thoracentesis: she declined this   - per meeting with palliative, she desires restorative cares, however she desires to limit her time in hospital, which given her medical issues does not really fit together  -plan was to discharge today 6/24. But crackles and lethargic. Would benefit from one more day of IV antibiotics, can likely discharge tomm on oral antibiotics to TCU. Patient was hesitant to stay, but would benefit from one more day of cefepime.      CAD and chronic diastolic CHF:   She is followed closely by [facility] for multiple chronic issues.  Most recent TTE 2/2019 showed RV volume overload and decreased RV function with preserved LVEF, severe TR and severe pulmonary hypertension. JOAN 5/6/2019 done in part for possible upcoming Watchman procedure showed similar findings.   Her cardiac medications were on hold due to soft BP  Cardiology was consulted  Plan  - continue metoprolol with hold parameters  - her torsemide was cut in half to 20 mg   -Patient was seen by cardiology please see the note of Dr. JERROD Sparrow on chronic iron def and chronic blood loss anemia:   She has a history of colonic angioid ectasias causing recurrent GI bleeds.   Occult blood is positive    Evaluated by GI, who did not recommend further endoscopies  - recheck CBC daily  - she is also declining Fe infusions    Chronic atrial fibrillation:   -Status post ablation and pacemaker placement;   -she is due to have Watchman done soon but this has already reportedly had to be postponed for acute illness last month and may need to be postponed further depending on her hospital course this stay.    -- As above     COPD and restrictive lung disease:   -She uses supplemental O2 at baseline   -Nebulizer are ordered  Continue-home inhalers      Stage 4 CKD:   Monitor closely     Diabetes type II:  Hold oral medications  ISS insulin ordered while hospitalized.   Lab Results   Component Value Date    A1C 6.3 04/09/2019    A1C  5.5 10/19/2018    A1C 6.9 07/21/2018    A1C 6.8 03/22/2018    A1C 6.6 01/04/2018          Hypokalemia:   -Replaced        Diet: Low Saturated Fat Na <2400 mg    DVT Prophylaxis: Pneumatic Compression Devices  Montano Catheter: not present  Code Status: DNR/DNI      Disposition Plan   Expected discharge: plan was to discharge today 6/24. But crackles and lethargic. Would benefit from one more day of IV antibiotics, can likely discharge tomm on oral antibiotics to TCU. Patient was hesitant to stay, but would benefit from one more day of cefepime.  Entered: Suzanne Spencer MD 06/24/2019, 11:14 AM       The patient's care was discussed with the Bedside Nurse and Patient.    Time spent greater than 35 minutes    Suzanne Spencer MD  Hospitalist Service  [facility]    ______________________________________________________________________    Interval History   Improving. Breathing is slightly better. Still crackles and lethargic.      Data reviewed today: I reviewed all medications, new labs and imaging results over the last 24 hours. I personally reviewed .    Physical Exam   Vital Signs: Temp: 96.8  F (36  C) Temp src: Oral BP: 104/50 Pulse: 71 Heart Rate: 71 Resp: 18 SpO2: 96 % O2 Device: Nasal cannula with humidification Oxygen Delivery: 2 LPM  Weight: 167 lbs 9.6 oz  Is nice and pleasant  She is not in any kind of respiratory distress when resting  Her mobility is very limited  She needs assistance getting up and getting to the bed  Pupils are equal and reactive  CVS exam S1 and S2 no S3  Chest fair entry on both side of lungs with decreased breath sounds on the right lung base        Data   Recent Labs   Lab 06/24/19  0715 06/23/19  0645 06/22/19  1157 06/22/19  0603  06/21/19  1703 06/21/19  1237 06/21/19  0852 06/21/19  0511 06/21/19  0216 06/21/19  0149   WBC 5.2  --   --   --   --   --   --   --  8.6  --  10.4   HGB 8.9* 8.1* 8.8* 8.7*   < > 8.6* 9.1*  --  7.0*  --  7.8*   MCV 80  --   --   --   --   --    --   --  80  --  79   *  --   --   --   --   --   --   --  141*  --  152     --   --  142  --   --   --   --  145* 144 Canceled, Test credited, specimen discarded   POTASSIUM 4.5  --   --  3.7  --  3.5  --   --  3.3* 3.3* Canceled, Test credited, specimen discarded   CHLORIDE 100  --   --  102  --   --   --   --  104 101 Canceled, Test credited, specimen discarded   CO2 31  --   --  35*  --   --   --   --  41* 37* Canceled, Test credited, specimen discarded   BUN 67*  --   --  63*  --   --   --   --  55* 57* Canceled, Test credited, specimen discarded   CR 2.22*  --   --  2.28*  --   --   --   --  2.02* 1.92* Canceled, Test credited, specimen discarded   ANIONGAP 6  --   --  5  --   --   --   --  <1* 6 Canceled, Test credited, specimen discarded   GERARD 9.3  --   --  8.4*  --   --   --   --  8.1* 8.6 Canceled, Test credited, specimen discarded   *  --   --  109*  --   --   --   --  149* 143* Canceled, Test credited, specimen discarded   ALBUMIN 2.9*  --   --   --   --   --   --   --   --  3.2* Canceled, Test credited, specimen discarded   PROTTOTAL 6.8  --   --   --   --   --   --   --   --  7.1 Canceled, Test credited, specimen discarded   BILITOTAL 1.1  --   --   --   --   --   --   --   --  1.5* Canceled, Test credited, specimen discarded   ALKPHOS 64  --   --   --   --   --   --   --   --  58 Canceled, Test credited, specimen discarded   ALT 12  --   --   --   --   --   --   --   --  14 Canceled, Test credited, specimen discarded   AST 13  --   --   --   --   --   --   --   --  9 Canceled, Test credited, specimen discarded   TROPI  --   --   --   --   --   --  0.021 <0.015 0.022  --   --     < > = values in this interval not displayed.

## 2019-06-24 NOTE — PROGRESS NOTES
EVAN  I: EVAN spoke with patient's niece, Enedelia, to update on discharge. SW called [facility] and they are currently assessing. Enedelia will provide O2 tank for transport when medically stable.       EVAN updated that [facility] has a bed for patient. SW will discuss with patient's niece. Enedelia is ok with discharge. SW called [facility] and they recommend calling at 1400 and arranging ride. They stated they would be available within 25 min of call. Staff confirmed it is a w/c transport. EVAN will update patient. [facility] and RN staff. SW will fax orders/PAS/scripts when complete.     PAS-RR    D: Per DHS regulation, EVAN completed and submitted PAS-RR to [place] [employer] Direct Connect via the Senior LinkAge Line.  PAS-RR confirmation # is : [id number]      I: EVAN spoke with pt and they are aware a PAS-RR has been submitted.  EVAN reviewed with pt that they may be contacted for a follow up appointment within 10 days of hospital discharge if their SNF stay is < 30 days.  Contact information for [facility] LinkAge Line was also provided.    A: pt verbalized understanding.    P: Further questions may be directed to [facility] LinkAge Line at #[phone], option #4 for PAS-RR staff.        ADDENDUM  I: MD updated SW via voicemail that patient would not be ready for discharge today. [facility]AN updated [facility].   P: EVAN will continue to follow and assist as needed.    Sharmila Call, NICOLE   *[phone]

## 2019-06-24 NOTE — PLAN OF CARE
Plan of care unchanged. Pt encouraged to perform IS. BG slightly elev per shift.  Sched nebs. Fe given for anemia. Plan is to discharge to [facility] tomorrow if hgb and creat trending positively.

## 2019-06-24 NOTE — PROGRESS NOTES
"SPIRITUAL HEALTH SERVICES Progress Note  [facility]    Follow up visit.  Pt talked about her  who  around 2001.  She said he was suffering during his last days, and she compared her situation now to his then.  She said she hopes that in rehab she is able to get stronger and get the care she needs without returning to the hospital.   asked about what she would prefer to do if it became necessary to come back to the hospital, and pt said, \"maybe that would be the end.\"  Pt said she has a HCD and her niece Enedelia knows her wishes.       provided listening, support and prayer.      Pt said she is hoping that she will be able to move to a place where she has care , and said about returning to the hospital, \"I don't want to come back here again.\"  Pt said she is working on getting her affairs in order, including thinking about the things she will leave behind for the important people in her life.      SH remains available as needs arise, and upon request.  SH will follow.        Holly Dee  Chaplain Resident  "

## 2019-06-24 NOTE — PLAN OF CARE
7a-3p shift  Pt feels that her respiratory  status has overall improved a little since admission. Remains on O2 as 2 LPM/nc to keep O2 sats >92%. . Remains SOB, lungs diminished with fine bibasilar rales. Not coughing so still unable to obtain sputum specimen.  Anemic Hgb 8.9 but trending up. Writer noted iron levels from yesterday being low and asked Hospitalist to order iron - order received.  Very unsteady/weak requires strong assist of 1/GB/walker to take shoft steps from bed to chair or BSC. Previous shifts' tap water enema effective and has been having stools.  & 202 requiring sliding scale insulin. Tele 100% V-paced. Tele dc'd today.   Plan to discharge to [facility] tomorrow if stable medically

## 2019-06-24 NOTE — PLAN OF CARE
"Discharge Planner PT   Patient plan for discharge: none stated   Current status: Patient up in chair upon arrival; agreeable to therapy. BP assessed 110/58. Required Min A for STS to FWW. Upon standing patient c/o legs feeling \"shaky.\" Ambulated 10 ft with FWW and Min A. Very unsteady. Patient fearful and returned to chair. Uncontrolled descent into chair. Instructed patient in exercises to perform independently while in chair.   Barriers to return to prior living situation: Level of assist, weakness, decreased activity tolerance, fall risk  Recommendations for discharge: TCU per plan established by the PT.  Rationale for recommendations: Pt does not appear safe to disch home to her ASHLEY at this time. Pt would benefit from continued skilled PT services in TCU to maximize functional activity tolerance and IND with mobility with pt reporting plan to transition to LTC.       Entered by: Peg Uriostegui 06/24/2019 11:20 AM     "

## 2019-06-25 NOTE — PLAN OF CARE
Physical Therapy Discharge Summary    Reason for therapy discharge:    Discharged to transitional care facility.    Progress towards therapy goal(s). See goals on Care Plan in [facility] electronic health record for goal details.  Goals not met.  Barriers to achieving goals:   discharge from facility.    Therapy recommendation(s):    Continued therapy is recommended.  Rationale/Recommendations:  Pt is below baseline and continues to have weakness affecting independence and level of assist with mobility that requires skilled care.

## 2019-06-25 NOTE — PLAN OF CARE
Occupational Therapy Discharge Summary    Reason for therapy discharge:    TCU cancelled and pt deciding to move towards hospice care.     Progress towards therapy goal(s). See goals on Care Plan in Epic electronic health record for goal details.  pt moving forward with hospice care    Therapy recommendation(s):    No further therapy is recommended.

## 2019-06-25 NOTE — PLAN OF CARE
A&Ox4, but falls asleep during conversation. VSS on 2L o2. Lungs diminished with crackles. Denies pain. Up with 2 and walker, Mod CHO diet. Bruise on buttocks, pt states from fall. . [facility]

## 2019-06-25 NOTE — PROGRESS NOTES
[facility]    Medicine Progress Note - Hospitalist Service       Date of Admission:  6/21/2019  Assessment & Plan    84 year old woman with extensive past medical history that is most notable for CAD; chronic diastolic CHF and Tricuspid regurgitation; chronic respiratory failure due to COPD, kyphosis and restrictive lung disease, and severe pulmonary hypertension; chronic atrial fibrillation on Eliquis; recurrent lower GI bleeding and chronic anemia; CKD Stage 4; and Type 2 Diabetes mellitus; and GERD; who presented with progressive weakness and dyspnea and is found to have suspected ongoing or recurrent left lower lobe pneumonia as well as right sided pleural effusion, and acute on chronic anemia.     Possible pneumonia HCAP  Right sided pleural effusion:   She was hospitalized 5/2019 for weakness and dyspnea and treated for acute diastolic CHF exacerbation. She was diuresed. CXR at that time showed a right sided pleural effusion as well as left lung base infiltrate and she received a course of Levaquin, and was eventually discharged to TCU, and from there it seems she has been discharged home.   Declined hospice after discussion with PCP  Currently p/w further weakness and dyspnea and seems unable to get up off the toilet on evening of admission.  CXR with left sided infiltrate and moderate right sided plueral effusion  Started on cefepime and added on azithromycin  chest CT which showed:  1. New small to moderate right pleural effusion with associated  compressive atelectasis and/or infiltrate.  2. Slight interval enlargement in irregular nodular density in the  right upper lobe since the comparison study (1.2 cm to 1.4 cm)  TTE completed which revealed no major changes other than preserved EF and failing RV, PHTN, and TR.  Plan  - continue cefepime and azithromycin and follow cultures, NGTD  - given her right sided pleural effusion, enlarging lung nodule, and her smoking history, we discussed  "the role of thoracentesis: she declined this   - per meeting with palliative, she desires restorative cares, however she desires to limit her time in hospital, which given her medical issues does not really fit together  -long discussion with patient 6/25 regarding goals of care.  She reports she feels the weakest she ever has.  Discussed multiple underlying chronic medical issues and concern that she will require repeat hospitalization in the near future due to her frail status and multiple medical problems.  She clearly states she does not wish to return to hospital.  Then discussed potential discharge to TCU with plan to enroll in hospice if she declined, but she ultimately states she is not interested in therapies and would prefer to be kept comfortable for what time she has left, stating, \"I've lived an excellent life without regrets.\"  She states she wishes to pursue hospice, though does wish to complete her course of antibiotics and continue on CHF meds to try to maintain stability.  We called her niece, Eneedlia, and discussed this decision on speakerphone, and she is in agreement.  Updated social with change in plan who will look into facility placement on hospice.  - stop cefepime, start levofloxacin      CAD and chronic diastolic CHF:   She is followed closely by [facility] for multiple chronic issues.  Most recent TTE 2/2019 showed RV volume overload and decreased RV function with preserved LVEF, severe TR and severe pulmonary hypertension. JOAN 5/6/2019 done in part for possible upcoming Watchman procedure showed similar findings.   Her cardiac medications were on hold due to soft BP  Cardiology was consulted  Plan  - continue metoprolol with hold parameters  - her torsemide was cut in half to 20 mg   - will stop statin given goals of care decision as above  -Patient was seen by cardiology please see the note of Dr. JERROD Sparrow on chronic iron def and chronic blood loss anemia:   She has a history of " colonic angioid ectasias causing recurrent GI bleeds.   Occult blood is positive    Evaluated by GI, who did not recommend further endoscopies  - stop oral iron and stop hgb monitoring    Chronic atrial fibrillation:   -Status post ablation and pacemaker placement;   -she is due to have Watchman done soon but this has already reportedly had to be postponed for acute illness last month and may need to be postponed further depending on her hospital course this stay.  - will not plan to resume anticoagulation at discharge     COPD and restrictive lung disease:   -She uses supplemental O2 at baseline   -Nebulizer are ordered  Continue-home inhalers      Stage 4 CKD:   Monitor closely     Diabetes type II:  Hold oral medications  ISS insulin ordered while hospitalized.      Hypokalemia:   -Replaced        Diet: Low Saturated Fat Na <2400 mg  Room Service    DVT Prophylaxis: Pneumatic Compression Devices  Montano Catheter: not present  Code Status: DNR/DNI      Disposition Plan   Expected discharge: LTC tomorrow on hospice pending placement  Entered: Ervin Lares MD 06/25/2019, 2:55 PM       The patient's care was discussed with the Bedside Nurse, Patient and Patient's Family.    Time Spent on this Encounter   I spent >35 minutes on the unit/floor managing the care of Helene Gibbs. Over 50% of my time was spent on the following:   - Counseling the patient and/or family regarding: prognosis and risks and benefits of treatment options  - Coordination of care with the: care coordinator/ and nurse      MD Ervin Kruger MD  Hospitalist Service  [facility]    ______________________________________________________________________    Interval History   Reports feeling very weak.  No dyspnea, no fever/chills.  Complains of difficulty moving bowels.  Long discussion regarding goals of care as above.    Data reviewed today: I reviewed all medications, new labs and imaging results  over the last 24 hours. I personally reviewed .    Physical Exam   Vital Signs: Temp: 96.2  F (35.7  C) Temp src: Oral BP: 123/59 Pulse: 73 Heart Rate: 70 Resp: 8 SpO2: 94 % O2 Device: Nasal cannula Oxygen Delivery: 2 LPM  Weight: 170 lbs 6.4 oz  Constitutional:  Well developed, frail, elderly female in NAD  Resp:  Bilateral crackles, no wheezing or tachypnea  Cardiac:  RRR, normal s1/s2 with grade 2/6 systolic mumrur  GI: abdomen soft, nontender, normal bowel sounds  Lymph: 1+ lower extremity edema  Neuro:  Easily falls asleep but arouses easily and is quite appropriate, able to recall historical details, decisional capacity remains intact          Data   Recent Labs   Lab 06/25/19  0735 06/24/19  0715 06/23/19  0645  06/22/19  0603  06/21/19  1237 06/21/19  0852 06/21/19  0511   WBC 4.6 5.2  --   --   --   --   --   --  8.6   HGB 8.9* 8.9* 8.1*   < > 8.7*   < > 9.1*  --  7.0*   MCV 81 80  --   --   --   --   --   --  80   * 136*  --   --   --   --   --   --  141*    137  --   --  142  --   --   --  145*   POTASSIUM 5.0 4.5  --   --  3.7   < >  --   --  3.3*   CHLORIDE 99 100  --   --  102  --   --   --  104   CO2 35* 31  --   --  35*  --   --   --  41*   BUN 72* 67*  --   --  63*  --   --   --  55*   CR 2.01* 2.22*  --   --  2.28*  --   --   --  2.02*   ANIONGAP 4 6  --   --  5  --   --   --  <1*   GERARD 9.6 9.3  --   --  8.4*  --   --   --  8.1*   * 190*  --   --  109*  --   --   --  149*   ALBUMIN 2.9* 2.9*  --   --   --   --   --   --   --    PROTTOTAL 7.3 6.8  --   --   --   --   --   --   --    BILITOTAL 0.9 1.1  --   --   --   --   --   --   --    ALKPHOS 64 64  --   --   --   --   --   --   --    ALT 13 12  --   --   --   --   --   --   --    AST 10 13  --   --   --   --   --   --   --    TROPI  --   --   --   --   --   --  0.021 <0.015 0.022    < > = values in this interval not displayed.

## 2019-06-25 NOTE — PROGRESS NOTES
Discharge Planner   Discharge Plans in progress: LTC referrals sent for anticipated discharge of Wed June 26.  [facility]  Barriers to discharge plan: LTC private bed availability  Follow up plan: EVAN to continue to follow and assist with discharge planning.    SW spoke to MD who reported that patient has decided she does not want to go to rehab but is more interested in LTC with Hospice. [facility] has no LTC beds avail at this time. SW attempted getting LTC choices from patient although patient was not able to be aroused. SW spoke to ra Mcdaniels via telephone who requested LTC facility in the [place], [place], [facility] areas. LTC referrals sent. Hospice consult arranged.    EVAN to continue to follow and assist with discharge planning.         Entered by: Kim De La Rosa 06/25/2019 4:20 PM

## 2019-06-25 NOTE — PLAN OF CARE
Pt is A and O, falling asleep while talking. VSS on 2L O2. Lungs: fine crackles on BLL. SOB. IS education provided and encouraged pt to use. Needs repetitive reminders. Not coughing this shift, unable to collect sputum sample as a result.  Denies pain.  Assist of 2 with GB and W, unsteady. Bruise on buttocks from previous fall.   On Iron pills due to HGB 8.9 and low iron. Continent this shift. Uses BSC. ,100 ml of orange juice given. Pacemaker noted on L upper chest. No changes in   heart rhythm. Plans for discharge possibly tomorrow if clinically stable, to [facility] TCU.

## 2019-06-25 NOTE — PROGRESS NOTES
SW:    Discharge Planner   Discharge Plans in progress: Patient to discharge to [facility] today.   Barriers to discharge plan: none identified  Follow up plan:   -SW confirmed with Reesa at [facility] that they are able to accept patient late this afternoon  -Patient to be seen by MD around 1400  -SW to arrange transport through [facility] ([phone]) per dtr's wishes.   -Dtr will provide portable oxygen for transport    SW to continue to follow and assist with discharge planning.    DC orders:  Scripts:  PAS: completed  Oxygen: dtr will provide portable tank  Trans: [facility] @ TBD (planning for 1600)         Entered by: Kim De La Rosa 06/25/2019 10:56 AM

## 2019-06-25 NOTE — PLAN OF CARE
"7a-3p shift report  Remains very weak - requires strong assist of 2/GB/walker pivot transfer from bed to chair. Poor ambulation attempts, Unable to  foot to take a step. Appetite poor but does eats small amounts of each meal tray. Is A&O but tires easily, napped x2 this shift. Lungs diminished with crackles bilaterally. POND, SOB at rest at times.  O2 at 2LPM keeping stats above 92%.Pt and her niece, Enedelia, are both aware of pt's overall poor declining health status   Pt again stated to me near shift start that she is \"not ready for hospice yet and really wants to give it a try at the TCU and see's what happens\". Pt is aware of  future probability of needing placement in a  long term care facility &/or hospice facility.   Plan is for transfer to [facility] TCU at approx 1600 today    1440 Addendeum:   Discharge to TCU will be cancelled. After her discussion with Dr. Lraes this afternoon, pt changed her mind and is now wanting to proceed instead with Hospice care at a long term facility instead of going to a TCU. She wants no further hospitalizations and is realizing her health is declining. Niece Enedelia aware    "

## 2019-06-25 NOTE — PROGRESS NOTES
[facility]  Patient is currently open to home care services with [facility]. The patient is currently receiving        Skilled Nursing, PT, OT and HHA services. Critical access hospital  and team have been notified of patient admission. Critical access hospital liaison will continue to follow patient during stay. If appropriate provide orders to resume home care at time of discharge.

## 2019-06-26 NOTE — PROGRESS NOTES
EVAN  I: EVAN was updated that [facility] can accept patient into a shared LTC bed. EVAN went to speak with patient, patient was lethargic and unable to converse at this time. EVAN called niece Enedelia and left message requesting a c/b.     ADDENDUM  I: EVAN spoke patient's niece, Enedelia, and she is interested in [facility]. EVAN called and left message with [facility] admissions. SW sent referral and is awaiting a c/b.     EVAN was updated that patient's discharge would be on hold for today. EVAN was updated that if patient stabilizes, she would be ready for discharge.  Hospice RN informed EVAN that patient's niece would like patient to discharge to [facility] hospice if medically stable for discharge. EVAN arranged stretcher for 1530 on 6/27. If patient is not medically stable for transport on 6/27, EVAN will cancel transport and update facility. Meds will need to be filled and sent with patient.    P: EVAN will continue to follow and assist as needed.    Sharmila Call, NICOLE   *[phone]

## 2019-06-26 NOTE — TELEPHONE ENCOUNTER
Returned call. OK'd requested orders.  Orders will be faxed to PCP for review and signature.   Janet Espinosa RN

## 2019-06-26 NOTE — PLAN OF CARE
Pt has been very somnolent since the beginning of this shift. Arouses only to vigorous stimulation. MD aware. Pt has not voided since 10:00 this AM, bladder scanned for more than 300 mL. Montano started, patent with good UO. VSS on 2L NC. Comfort care initiated this shift. Pt became more awake later in the shift. Orientation CANDACE as pt unable to participate. C/o back pain, a dose of PO dilaudid given with no signs of pain noted afterwards. Turned and repoed Q 2hrs. Took couple bites of dinner but falls back to sleep in between cares. IV saline locked. Mepilx border on bony prominences, no open areas noted this shift. Plan to discharge to LTC with hospice tomorrow pending placement. Continue to monitor.

## 2019-06-26 NOTE — PROGRESS NOTES
SPIRITUAL HEALTH SERVICES Progress Note  [facility]    Follow up visit.   provided presence for pt who is actively dying, as documented in provider notes.   will follow.      Holly Dee  Chaplain Resident

## 2019-06-26 NOTE — PLAN OF CARE
7a-3p shift report  Status change - Dr Lares and niece Enedelia, aware.  Much more lethargic this am, Responded to gentle shaking and loud call of her name. Respirations irregular 10-12/min with approx 15 seconds of apnea. Pt unable to safely swallow anything due to lethargy. Medicated x1 at 0911 when became restless/agitated after being repositioned. All scheduled oral  meds were held/not given. Status terminal. Full Comfort care orders in place. Writer spent time providing education/e,emotional support to ra who was made aware of deteriorating status and death anticipated within hours to days  Enedelia requested to be called on her landline phone at time of death. Continue to monitor

## 2019-06-26 NOTE — TELEPHONE ENCOUNTER
Reason for Call:  Home Health Care    Nadia with  Homecare called regarding (reason for call): orders    Orders are needed for this patient.     If stable for discharge from  Hospital, patient will go to hospice care tomorrow.      Looking for an order that Dr. Esparza will follow patient in hospice care, and if so, will need standing orders for hospice care.    Phone Number Homecare Nurse can be reached at: [phone]    Can we leave a detailed message on this number? YES      Call taken on 6/26/2019 at 2:16 PM by Alysa Carrizales  .

## 2019-06-26 NOTE — PROGRESS NOTES
SPIRITUAL HEALTH SERVICES Progress Note  [facility]    Follow up visit.  Pt was very sleepy, and appeared to recognize  from previous visits when she opened her eyes briefly several times during visit.   provided presence and prayer, which pt has requested in previous visits.  SH remains available as needs arise, and upon request.  SH will continue to follow.      Holly Dee  Chaplain Resident

## 2019-06-26 NOTE — PROGRESS NOTES
Hospice: Met with patients ra Mcdaniels to explain the hospice program. Enedelia is familiar with hospice services as she has had some family members on hospice care.  I provided the hospice information sheet and brochure for her to refer to after.   Enedelia signed the hospice consent forms for hospice services to begin upon discharge.   Enedelia is aware that the patient could die before she is transported. Hospitalist will determine if pt stable for discharge  The plan is for the patient to be discharged to [facility] tomorrow around 3:30p. The  Hospice team will complete the admission tomorrow at 4p at [facility] if pt stable for discharge.   POLST completed and placed on front of the chart for signature  Please order the following hospice comfort meds for discharge:    1. Acetaminophen 650 mg supp IL every 4 hrs as needed for pain/fever #2  2. bisacodyl 10 mg supp IL daily as needed for constipation #2,   3. atropine sulfate 1% 2 to 4 drops SL as needed every 2 hrs for terminal congestion/secretions # 2 ml,   4. haloperidol 0.5 mg tab 1 to 2 tabs po/sl every 6 hrs nausea, agitation # 15,   5. lorazepam 0.5mg tab 1/2 to 1 tab po/sl every 4 hrs as needed for anxiety/restlessness #30,    6. hydromorphone 10 mg/ml give 1 to 2 mg or 0.1 to 0.2 ml SL every 2 hrs pain/dyspnea #30 ml  Enedelia has been given her signed copies of the hospice consent forms along with the hospice handbook with the 24 hr number.  I will be ordering a liquid oxygen set up from [facility] to be delivered to facility in am.   Thank you for the referral  Nadia Elizondo RN, BSN   Hospice Admission nurse  [phone]

## 2019-06-26 NOTE — PROGRESS NOTES
Pt has a dual chamber pacemaker that was placed on 10/25/12. Spoke with a representative from [employer], the pacemaker company, asking if anything needs to be done with pacemaker when pt is actively dying. Rep responded that nothing needs to be done since the device is only a pacemaker and not a defibrillator.

## 2019-06-26 NOTE — PLAN OF CARE
Pt alert and oriented x2. Episode of agitation and pain around 0430, PO dilaudid solution given for comfort. Turn and repo q2. Montano intact. On 2L NC. Up w/ 2 and walker. On comfort cares. Sleeps during conversation. CTM

## 2019-06-26 NOTE — PROGRESS NOTES
[facility]    Medicine Progress Note - Hospitalist Service       Date of Admission:  6/21/2019  Assessment & Plan    84 year old woman with extensive past medical history that is most notable for CAD; chronic diastolic CHF and Tricuspid regurgitation; chronic respiratory failure due to COPD, kyphosis and restrictive lung disease, and severe pulmonary hypertension; chronic atrial fibrillation on Eliquis; recurrent lower GI bleeding and chronic anemia; CKD Stage 4; and Type 2 Diabetes mellitus; and GERD; who presented with progressive weakness and dyspnea and is found to have suspected ongoing or recurrent left lower lobe pneumonia as well as right sided pleural effusion, and acute on chronic anemia.  Of note, she was recently hospitalized 5/2019 and treated with CHF exacerbation and pneumonia (note this was 3rd hospitalization in 6 months).  She went to TCU and followed up with PCP who recommended hospice, but she declined at that time.     Possible pneumonia HCAP  Right sided pleural effusion  She presented with further weakness and dyspnea. With CT chest showing new small-mod right effusion with associated infiltrate vs atelectasis and interval enlargement of RUL nodular density compared to prior.  TTE 6/22 revealed no major changes other than preserved EF and failing RV, PHTN, and TR.  She was treated with cefepime and azithromycin for PNA.  Palliative care was consulted, and she initially declined any interest in Hospice enrollment, but after several discussions with providers, she ultimately decided she did not want to return to hospital and elected to enroll in hospice (see progress note 6/25).    - informational hospice meeting today  - she is currently quite somnolent and experiencing apneic episodes (though did receive dilaudid overnight for agitation), concerned that she is entering final stages of life and do not feel she is currently stable for transfer to LTC   - attempting to contact  ra to recommend transition to full comfort care, but she is unavailable this AM, will follow up this afternoon  ADDENDUM:  Spoke with ra, Enedelia, at bedside, she is in agreement with transition to full comfort care.  Orders placed.    CAD and chronic diastolic CHF:   She is followed closely by [facility] for multiple chronic issues.  Most recent TTE 2/2019 showed RV volume overload and decreased RV function with preserved LVEF, severe TR and severe pulmonary hypertension.     - continue metoprolol with hold parameters  - her torsemide was cut in half to 20 mg   - statin stopped 6/25 following goals of care discussion    Acute on chronic iron def and chronic blood loss anemia:   She has a history of colonic angioid ectasias causing recurrent GI bleeds.  GI consulted, recommended against further endoscopies given known history of extensive AVM's.  - oral iron stopped 6/25 following goals of care discussion    Chronic atrial fibrillation:   Status post ablation and pacemaker placement.  Anticoagulation held at admission due to GIB.  - will not plan to resume anticoagulation at discharge     COPD and restrictive lung disease:   She uses supplemental O2 at baseline   - continue nebs      Stage 4 CKD:   Cr stable 2.0-2.2     Diabetes type II:  Hold oral medications  - ISS insulin ordered while hospitalized.      Hypokalemia:   - Replaced        Diet: Low Saturated Fat Na <2400 mg  Room Service    DVT Prophylaxis: none as comfort care  Montano Catheter: in place, indication: Retention;Other (Comment)(comfort)  Code Status: DNR/DNI      Disposition Plan   Expected discharge: hold discharge today due to tenuous status  Entered: Ervin Lares MD 06/26/2019, 11:32 AM       The patient's care was discussed with the Bedside Nurse and Patient.    Ervin Lares MD  Hospitalist Service  [facility]    ______________________________________________________________________    Interval History   She is very  sleepy today and did not arouse to voice or light touch.      Agitated overnight and received dose of dilaudid.    Data reviewed today: I reviewed all medications, new labs and imaging results over the last 24 hours. I personally reviewed .    Physical Exam   Vital Signs: Temp: 97.2  F (36.2  C) Temp src: Axillary BP: 102/56   Heart Rate: 70 Resp: 10 SpO2: 92 % O2 Device: Nasal cannula Oxygen Delivery: 1/2 LPM  Weight: 170 lbs 6.4 oz  Constitutional:  Well developed, frail, elderly female in NAD  Resp:  Anterior lung sounds clear, no wheezing or tachypnea, occasional apneic episodes  Cardiac:  RRR, normal s1/s2 with grade 2/6 systolic mumrur  GI: normal bowel sounds  Extremities:  Lower extremities slightly cool to touch, no mottling present  Neuro:  somnolent          Data   Recent Labs   Lab 06/25/19  0735 06/24/19  0715 06/23/19  0645  06/22/19  0603  06/21/19  1237 06/21/19  0852 06/21/19  0511   WBC 4.6 5.2  --   --   --   --   --   --  8.6   HGB 8.9* 8.9* 8.1*   < > 8.7*   < > 9.1*  --  7.0*   MCV 81 80  --   --   --   --   --   --  80   * 136*  --   --   --   --   --   --  141*    137  --   --  142  --   --   --  145*   POTASSIUM 5.0 4.5  --   --  3.7   < >  --   --  3.3*   CHLORIDE 99 100  --   --  102  --   --   --  104   CO2 35* 31  --   --  35*  --   --   --  41*   BUN 72* 67*  --   --  63*  --   --   --  55*   CR 2.01* 2.22*  --   --  2.28*  --   --   --  2.02*   ANIONGAP 4 6  --   --  5  --   --   --  <1*   GERARD 9.6 9.3  --   --  8.4*  --   --   --  8.1*   * 190*  --   --  109*  --   --   --  149*   ALBUMIN 2.9* 2.9*  --   --   --   --   --   --   --    PROTTOTAL 7.3 6.8  --   --   --   --   --   --   --    BILITOTAL 0.9 1.1  --   --   --   --   --   --   --    ALKPHOS 64 64  --   --   --   --   --   --   --    ALT 13 12  --   --   --   --   --   --   --    AST 10 13  --   --   --   --   --   --   --    TROPI  --   --   --   --   --   --  0.021 <0.015 0.022    < > = values in this  interval not displayed.

## 2019-06-27 NOTE — TELEPHONE ENCOUNTER
Pt's niece called and to let us know that the Pt passed away this morning. She also wanted to let Dr. Esparza know that the Pt trusted her and that she thankful of the care that Dr. Esparza gave.

## 2019-06-27 NOTE — TELEPHONE ENCOUNTER
Made a courtesy phone call to her niece for condolence  She was very appreciative.  Dr.Nasima Vilma MD

## 2019-06-27 NOTE — DEATH PRONOUNCEMENT
House Officer Death Pronouncement    Called by nursing staff to pronounce Helene Gibbs dead.    Physical Exam: Unresponsive to noxious stimuli, Spontaneous respirations absent, Breath sounds absent, Carotid pulse absent, Heart sounds absent and Pupillary light reflex absent    Patient was pronounced dead at 1050: AM, 2019.    No data filed        Active Problems:    Pneumonia, unspecified organism       Infectious disease present?: YES    Communicable disease present? (examples: HIV, chicken pox, TB, Ebola, CJD) :  NO    Multi-drug resistant organism present? (example: MRSA): NO    Please consider an autopsy if any of the following exist:  NO Unexpected or unexplained death during or following any dental, medical, or surgical diagnostic treatment procedures.   NO Death of mother at or up to seven days after delivery.     NO All  and pediatric deaths.     NO Death where the cause is sufficiently obscure to delay completion of the death certificate.   NO Deaths in which autopsy would confirm a suspected illness/condition that would affect surviving family members or recipients of transplanted organs.     The following deaths must be reported to the 's Office:  NO A death that may be due entirely or in part to any factors other than natural disease (recent surgery, recent trauma, suspected abuse/neglect).   NO A death that may be an accident, suicide, or homicide.     NO Any sudden, unexpected death in which there is no prior history of significant heart disease or any other condition associated with sudden death.   NO A death under suspicious, unusual, or unexpected circumstances.    NO Any death which is apparently due to natural causes but in which the  does not have a personal physician familiar with the patient s medical history, social, or environmental situation or the circumstances of the terminal event.   NO Any death apparently due to Sudden Infant Death Syndrome.     NO  Deaths that occur during, in association with, or as consequences of a diagnostic, therapeutic, or anesthetic procedure.   NO Any death in which a fracture of a major bone has occurred within the past (6) six months.   NO A death of persons note seen by their physician within 120 days of demise.     NO Any death in which the  was an inmate of a public institution or was in the custody of Law Enforcement personnel.   NO  All unexpected deaths of children   NO Solid organ donors   NO Unidentified bodies   YES Deaths of persons whose bodies are to be cremated or otherwise disposed of so that the bodies will later be unavailable for examination;   NO Deaths unattended by a physician outside of a licensed healthcare facility or licensed residential hospice program   NO Deaths occurring within 24 hours of arrival to a health care facility if death is unexpected.    NO Deaths associated with the decedent s employment.   NO Deaths attributed to acts of terrorism.   NO Any death in which there is uncertainty as to whether it is a medical examiner s care should be discussed with the medical investigator.      Death Certificate to be directed to Dr. Abhijit Abebe  Attending physician, Dr. Abhijit Abebe, notified of death.    Body disposition: Autopsy was discussed with family member:  Niece Enedelia Kamara by phone.  Permission for autopsy was declined.    Allison Brennan, [facility] Hospitalist - [place] [facility]  [phone]     Text Page

## 2019-06-27 NOTE — PROVIDER NOTIFICATION
MD Notification    Notified Person: MD    Notified Person Name: Andrae    Notification Date/Time: 06/27/19 @ 8:24 AM     Notification Interaction: web paged MD    Purpose of Notification: Request for PRN ativan order for comfort cares    Orders Received:    Comments:

## 2019-06-27 NOTE — PROVIDER NOTIFICATION
MD Notification    Notified Person: MD    Notified Person Name: JENIFER Abebe    Notification Date/Time: 6/27/19 9:51 a.m.    Notification Interaction: FYI page that we have a hospice discharge via stretcher to [facility] at 15:30 asking for MD to review for discharge (if stable for transport) and get rx filled (per Hospice Liaison recc) and sign POLST for discharge.    Purpose of Notification: FYI for discharge and hospice placement planning.     Orders Received: Awaiting orders or call back    Comments: Sent updated page to cancel orders for discharge.

## 2019-06-27 NOTE — PLAN OF CARE
[id number]: Alert to self. Full assessment deferred for comfort cares. Zyprexa given x1 for agitation. PO dilaudid given for pain. Oral cares preformed. Turn and repositioned. Montano patent. Restless at times.

## 2019-06-30 NOTE — DISCHARGE SUMMARY
[facility]    Discharge Summary  Hospitalist    Date of Admission:  6/21/2019  Date of Discharge:  6/27/2019  1:17 PM  Discharging Provider: Abhijit Abebe MD  Date of Service (when I saw the patient): I did not personally see this patient 6/27/2019.    Discharge Diagnoses   Possible HCAP  R sided pleural effusion   CAD  Chronic diastolic heart failure  Iron deficinecy/ blood loss anemia  Chronic atrial fibrillation  COPD  Restrictive lung disease  CKD, stage 4  DM II      History of Present Illness   84 year old woman with extensive past medical history that is most notable for CAD; chronic diastolic CHF and Tricuspid regurgitation; chronic respiratory failure due to COPD, kyphosis and restrictive lung disease, and severe pulmonary hypertension; chronic atrial fibrillation on Eliquis; recurrent lower GI bleeding and chronic anemia; CKD Stage 4; and Type 2 Diabetes mellitus; and GERD; who presented with progressive weakness and dyspnea and is found to have suspected ongoing or recurrent left lower lobe pneumonia as well as right sided pleural effusion, and acute on chronic anemia.  Of note, she was recently hospitalized 5/2019 and treated with CHF exacerbation and pneumonia (note this was 3rd hospitalization in 6 months).  She went to TCU and followed up with PCP who recommended hospice, but she declined at that time.     Hospital Course   Helene Gibbs was admitted on 6/21/2019.  The following problems were addressed during her hospitalization:    Possible HCAP  R sided pleural effusion   CAD  Chronic diastolic heart failure  Iron deficinecy/ blood loss anemia  Chronic atrial fibrillation  COPD  Restrictive lung disease  CKD, stage 4  DM II  Ms. Gibbs chronic and unfortunate medical history.  She presented to the hospital with weakness and dyspnea.  His CT of her chest with concern for pneumonia.  She was given antibiotics during her hospitalization.  Given her chronic illnesses hospice  was consulted in the past and she did not want to enroll.  During her hospitalization was clear that she was nearing the end of her life.  Ultimately a family member, her niece, came and met with the team.  It was decided to make her comfort cares.  The plan was to discharge her home with hospice; however, she passed away shortly after being made comfort cares.    Abhijit Abebe M.D.  Hospitalist  Pager [phone]    Significant Results and Procedures   CT chest  Limited echocardiogram    Pending Results   None    Code Status   Comfort Care       Primary Care Physician   Neisha Esparza    Physical Exam                      Vitals:    19 0616 19 0637 19 1100   Weight: 80.3 kg (177 lb 1.6 oz) 76 kg (167 lb 9.6 oz) 77.3 kg (170 lb 6.4 oz)     Vital Signs with Ranges     No intake/output data recorded.    Not performed, pt passed before this provider was able to see her    Discharge Disposition   Patient  during this admission  Condition at discharge: Stable    Consultations This Hospital Stay   CARDIOLOGY IP CONSULT  GASTROENTEROLOGY IP CONSULT  CARE TRANSITION RN/SW IP CONSULT  PALLIATIVE CARE ADULT IP CONSULT  PHYSICAL THERAPY ADULT IP CONSULT  OCCUPATIONAL THERAPY ADULT IP CONSULT  CARE TRANSITION RN/SW IP CONSULT    Time Spent on this Encounter   I, Abhijit Abebe, discharged this patient today but I did not personally see the patient today and will not be billing for the patient's discharge.    Discharge Orders   No discharge procedures on file.  Discharge Medications   Discharge Medication List as of 2019  1:21 PM      CONTINUE these medications which have NOT CHANGED    Details   ACCU-CHEK SMARTVIEW test strip USE 1 STRIP BY IN VITRO ROUTE 2 TIMES DAILY OR AS DIRECTED, Disp-200 strip, R-0, PAM, E-Prescribe      ACE/ARB/ARNI NOT PRESCRIBED (INTENTIONAL) Reason ACE/ARB was Not Prescribed: Worsening renal function on ACE/ARB therapy      albuterol (PROAIR HFA, PROVENTIL HFA,  VENTOLIN HFA) 108 (90 BASE) MCG/ACT inhaler Inhale 2 puffs into the lungs every 4 hours as needed for shortness of breath / dyspnea or wheezing, Disp-1 Inhaler, R-5, E-Prescribe      apixaban ANTICOAGULANT (ELIQUIS) 2.5 MG tablet Take 1 tablet (2.5 mg) by mouth 2 times daily, Disp-180 tablet, R-3, E-Prescribe      ASPIRIN NOT PRESCRIBED (INTENTIONAL) Reason ASA was Not Prescribed: Current anticoagulant therapy (warfarin/enoxaparin)      bisacodyl (DULCOLAX) 5 MG EC tablet Take 10 mg by mouth At Bedtime, Historical      blood glucose monitoring (NO BRAND SPECIFIED) meter device kit Accucheck Mariza meter; verified with pharmacistDisp-1 kit, [id number] reports machine is broke/not working again. Advised pt to bring machine into pharmacy to make sure she gets right dispensed machine.      denosumab (PROLIA) 60 MG/ML SOLN injection Inject 1 mL (60 mg) Subcutaneous every 6 months, Disp-1 mL, R-1, Injection      erythromycin (ROMYCIN) 5 MG/GM ophthalmic ointment Place 0.5 inches Into the left eye 3 times dailyDisp-1 Tube, [id number]      fluticasone (FLONASE) 50 MCG/ACT nasal spray Spray 2 sprays into both nostrils daily, Disp-15.8 mL, R-11, E-Prescribe      glipiZIDE (GLUCOTROL) 5 MG tablet Take 1 tablet (5 mg) by mouth every morning (before breakfast), Disp-90 tablet, R-1, E-Prescribe      hydrocortisone (ANUSOL-HC) 2.5 % cream Place rectally 2 times daily as needed for hemorrhoidsDisp-30 g, [id number]      hydrOXYzine (VISTARIL) 25 MG capsule Take 1 capsule (25 mg) by mouth 2 times daily as needed for itching, Disp-180 capsule, R-1, E-Prescribe      hypromellose (ARTIFICIAL TEARS) 0.5 % SOLN ophthalmic solution Place 1 drop into both eyes every hour as needed for dry eyes, Historical      lidocaine (LIDODERM) 5 % patch APPLY UP TO 3 PATCHES TO PAINFUL AREA AT ONCE FOR 12 HOUR PERIOD. REMOVE AFTER 12 HOURSDisp-60 patch, [id number]      metoprolol tartrate (LOPRESSOR) 25 MG tablet TAKE 1 TABLET BY MOUTH  TWICE A DAY, Disp-90 tablet, R-1, E-Prescribe      pregabalin (LYRICA) 75 MG capsule Take 1 capsule (75 mg) by mouth 2 times daily, Disp-10 capsule, R-1, Transitional      ranitidine (ZANTAC) 150 MG tablet Take 1 tablet (150 mg) by mouth At Bedtime, Disp-90 tablet, R-3, E-PrescribeDose is changed      simvastatin (ZOCOR) 10 MG tablet Take 1 tablet (10 mg) by mouth At Bedtime, Disp-90 tablet, R-3, E-Prescribe      tiotropium (SPIRIVA) 18 MCG inhaled capsule Inhale 1 capsule (18 mcg) into the lungs daily, Disp-90 capsule, R-1, E-Prescribe      torsemide (DEMADEX) 20 MG tablet Take 1 tablet (20 mg) by mouth 2 times daily, Disp-90 tablet, R-1, No Print Out      traMADol (ULTRAM) 50 MG tablet Take 1 tablet (50 mg) by mouth every 6 hours as needed for severe pain, Disp-12 tablet, Transitional      triamcinolone (KENALOG) 0.1 % external cream Apply topically 2 times daily as needed for irritationHistorical           Allergies   Allergies   Allergen Reactions     Ambien [Zolpidem Tartrate] Visual Disturbance     Hallucinations and fell out of bed at night.     Penicillins Hives     Definity      Caused a syncopal episode.     Sulfa Drugs Itching     Cymbalta Rash     Fluconazole Rash     Tolerates miconazole suppositories     Data   Most Recent 3 CBC's:  Recent Labs   Lab Test 06/25/19  0735 06/24/19  0715 06/23/19  0645  06/21/19  0511   WBC 4.6 5.2  --   --  8.6   HGB 8.9* 8.9* 8.1*   < > 7.0*   MCV 81 80  --   --  80   * 136*  --   --  141*    < > = values in this interval not displayed.      Most Recent 3 BMP's:  Recent Labs   Lab Test 06/25/19  0735 06/24/19  0715 06/22/19  0603    137 142   POTASSIUM 5.0 4.5 3.7   CHLORIDE 99 100 102   CO2 35* 31 35*   BUN 72* 67* 63*   CR 2.01* 2.22* 2.28*   ANIONGAP 4 6 5   GERARD 9.6 9.3 8.4*   * 190* 109*     Most Recent 2 LFT's:  Recent Labs   Lab Test 06/25/19  0735 06/24/19  0715   AST 10 13   ALT 13 12   ALKPHOS 64 64   BILITOTAL 0.9 1.1     Most Recent INR's  and Anticoagulation Dosing History:  Anticoagulation Dose History     Recent Dosing and Labs Latest Ref Rng & Units 12/3/2015 12/8/2015 8/9/2017 1/4/2018 1/5/2018 10/19/2018 5/7/2019    INR 0.86 - 1.14 1.21(H) 1.21(H) 1.41(H) 0.97 1.00 1.54(H) 1.17(H)    INR 0.86 - 1.14 - - - - - - -    INR Point of Care 0.86 - 1.14 - - - - - - -        Most Recent 3 Troponin's:  Recent Labs   Lab Test 06/21/19  1237 06/21/19  0852 06/21/19  0511   TROPI 0.021 <0.015 0.022     Most Recent Cholesterol Panel:  Recent Labs   Lab Test 02/25/19  1320   CHOL 103   LDL 44   HDL 46*   TRIG 65     Most Recent 6 Bacteria Isolates From Any Culture (See EPIC Reports for Culture Details):  Recent Labs   Lab Test 06/21/19  0315 12/21/18  1402 12/19/18  2230 10/23/18  0911 10/22/18  2300 10/19/18  2300   CULT No growth  No growth No anaerobes isolated  Light growth  Coagulase negative Staphylococcus  *  Light growth  Enterococcus faecalis  *  Susceptibility testing requested by  Dr Monzon [phone] for Coag Negative Staph. 12.24.18 1000. IRASEMA   10,000 to 50,000 colonies/mL  Escherichia coli  *  10,000 to 50,000 colonies/mL  Enterococcus faecalis  * Light growth  Escherichia coli  *  Light growth  Strain 2  Escherichia coli  *  Plus  Light growth  Normal pal   No growth <10,000 colonies/mL  Escherichia coli  *     Most Recent TSH, T4 and A1c Labs:  Recent Labs   Lab Test 04/09/19  1414 12/19/18  2053   TSH  --  3.62   A1C 6.3*  --      Results for orders placed or performed during the hospital encounter of 06/21/19   XR Chest 2 Views    Narrative    CHEST TWO VIEWS  6/21/2019 2:52 AM     HISTORY: Weakness.    COMPARISON: 5/7/2019.    FINDINGS: Left-sided pacer device. The heart is enlarged.  Moderate-sized right pleural effusion. Left lower lobe air-space  opacity similar to prior. No pneumothorax.      Impression    IMPRESSION: Persistent left lower lobe air-space opacity may represent  continued pneumonia. Moderate-sized right  effusion.    MALICK MARTINEZ MD   CT Chest w/o Contrast    Narrative    CT CHEST WITHOUT CONTRAST  6/21/2019 1:24 PM     HISTORY:  Acute respiratory illness. Cough, persistent. Dyspnea,  chronic. Negative or nondiagnostic x-ray. Pneumonia, unresolved or  complicated. Shortness of breath. Pleural effusion. Pleurisy or  effusion suspected.    COMPARISON: May 22, 2018    TECHNIQUE: Volumetric helical acquisition of CT images of the chest  from the clavicles to the kidneys were acquired without IV contrast.  Radiation dose for this scan was reduced using automated exposure  control, adjustment of the mA and/or kV according to patient size, or  iterative reconstruction technique.    FINDINGS:  Irregular nodule in the anterior right upper lobe measures  1.4 cm today, previously 1.2 cm. New small to moderate right pleural  effusion. Moderately extensive right lower lobe atelectasis and/or  infiltrate. Similar-appearing airspace changes in the left lower lobe,  question rounded atelectasis. Cardiomegaly. No left pleural or  pericardial effusion. No acute findings in the visualized upper  abdomen. Lower thoracic compression deformities appear stable.      Impression    IMPRESSION:  1. New small to moderate right pleural effusion with associated  compressive atelectasis and/or infiltrate.  2. Slight interval enlargement in irregular nodular density in the  right upper lobe since the comparison study.    MABLE VIZCARRA MD     *Note: Due to a large number of results and/or encounters for the requested time period, some results have not been displayed. A complete set of results can be found in Results Review.

## 2019-07-10 ENCOUNTER — PATIENT OUTREACH (OUTPATIENT)
Dept: CARE COORDINATION | Facility: CLINIC | Age: 84
End: 2019-07-10

## 2020-08-18 NOTE — TELEPHONE ENCOUNTER
Have her check hemoglobin on Monday.9/18  Agree with iron infusion.  Orders are placed.  Dr.Nasima Vilma MD    
JAZMINI PCP:  Oncology has already placed Iron infusion orders for patient.  She is scheduled to have infusion this Friday and also Next Friday.  She will come in on Monday to have Hbg rechecked.  She also has contacted Cardiology, Dr. Juarez, to update his office on current treatment.  Holly Benavidez RN      
Reason for Call:  Other FYI    Detailed comments: The patient called and said that her HGB is holding steady at 8.3  Starting Iron infusions on Friday 9/15    Phone Number Patient can be reached at: Home number on file [phone] (home)    Best Time: anytime    Can we leave a detailed message on this number? YES    Call taken on 9/13/2017 at 9:41 AM by Betzaida Fisher      
TO PCP   See below, patient seen yesterday by oncology, had hemoglobin lab checked (8.3), and was advised to start iron infusions.     Shawanda REID RN    
wrist pain/injury

## 2021-04-12 NOTE — PROVIDER NOTIFICATION
"Pt c/o epigastric \"tightness\", rating it 6/10. 12 lead EKG done/no changes. SL nitro x3 given without improvement. MD (Dr. Dodson ) from cardiology notified. MD verbalized he will see pt shortly.Order for troponin received.     "
Brief update:    NSTEMI (troponin elevation)    Transfer to [facility]  ASA was added  Cardiology consult  Cancel lexiscan  Heparin ggt (d/c eliquis at this time)    Imer Reyes MD  2:39 AM    
Pt on heparin drip; noted to have blood in her stool. Dr. Dodson from Cardiology notified whether to continue heparin gtt or not. Order received to check for hemoglobin now and  continue heparin at this time. MD also ordered to update Dr. Pickett.   
Pt requesting for MOM for constipation; Dr. Pickett notified via web-page/ awaiting for response.  Addendum: order received.   
Pt requesting to eat. Dr. Pickett notified via web-page requesting for a diet order.   
Updated Dr. Pickett regarding pt having blood in her stool. No new orders received at this time. MD verbalized to continue heparin as indicated by Cardiology. However, continue to monitor pt closely ( notify MD if hemoglobin is dropping or pt having more bleeding.)   
12-Apr-2021 17:09

## 2022-01-09 NOTE — PLAN OF CARE
Problem: Patient Care Overview  Goal: Discharge Needs Assessment  Outcome: Improving   Observation goals PRIOR TO DISCHARGE     Comments: -diagnostic tests and consults completed and resulted , met  -vital signs normal or at patient baseline ,met  Nurse to notify provider when observation goals have been met and patient is ready for discharge.               No

## 2022-02-09 NOTE — PATIENT INSTRUCTIONS
XR today   The dose of lisinopril is increased to total of 30 mg daily( # 2 tablets every morning and # 1 tablet every evening)  Shingrix today.  percocet can be habit-forming. It should be taken as prescribed. Do not mix it  with alcohol. Be careful with driving.Do not loose the  Prescription.  Do not overuse this medication. It is a controlled substance.  Follow up in one month       [Breast Cancer] : breast cancer [Routine Follow-Up] : routine follow-up visit for

## 2023-08-29 NOTE — PLAN OF CARE
I spoke to Radhika Younger for 10 minutes about the prior authorization for the MRI. It was approved. Auth # [id number] from 8/21/23-2/25/23. Problem: Patient Care Overview  Goal: Plan of Care/Patient Progress Review  Outcome: Improving  Arrived at 1300; general anesthesia. A/Ox4; lethargic. Therapy at 1600. [place] patent. BG checks. Tolerating crackers and broth. Doppler for pulses. Numbness in BLEs neuropathy at baseline. Tele 100% V-paced. IV Dialudid for pain. 3L O2. IVF. Continue to monitor.

## 2023-11-13 NOTE — CONSULTS
GASTROENTEROLOGY CONSULTATION      Helene Gibbs  [address]   [place] [address]  82 year old female     Admission Date/Time: 8/9/2017  Primary Care Provider: Neisha Esparza  Referring / Attending Physician:  Dr. Rodrigues     We were asked to see the patient in consultation by Dr. Rodrigues for evaluation of anemia.        HPI:  Helene Gibbs is a 82 year old female with medical history of atrial fibrillation on Eliquis, COPD, HTN, and DM type 2 who presented at the prompting of her PCP after finding a Hgb of 6.4.     Patient is a fair historian.  She denies experiencing any symptoms of lightheadedness, dizziness, or shortness of breath.  She denies melena or hematochezia.  No nausea or vomiting.  However she does tend towards constipation also reporting a history of rectal prolapse.  She had been on Coumadin for a number of years but approximately two years ago she has to be switched to Eliquis to avoid frequent lab checks.  No history of GI bleeding. She has not been taking NSAIDs. She does not smoke.     In October 2016 she underwent a colonoscopy with the colorectal surgery team.  There were two polyps seen one of the transverse colon and one in the sigmoid.  Diverticulosis was noted throughout the sigmoid colon.  There were no findings to explain anemia.  She followed up with Dr. Abdi in clinic, a flexible sigmoidoscopy was completed in November 2016 ( unable to the report). The patient reports they discussed rectal prolapse possible surgery to correct this.  The patient would like a second opinion from [facility] she has scheduled in the coming weeks.       PAST MEDICAL HISTORY:  Patient Active Problem List    Diagnosis Date Noted     Shoulder pain, left 07/26/2017     Priority: Medium     Tubular adenoma 10/10/2016     Priority: Medium     Basal cell carcinoma of skin 09/27/2016     Priority: Medium     Controlled substance agreement signed 09/27/2016     Priority: Medium     Chronic pain  The patient's goals for the shift include pt. glucose level will be within mikie range.    The clinical goals for the shift include pt. will be safe from injury    Over the shift, the patient did not make progress toward the following goals. Barriers to progression include not wanting to interact with male staff for he can interrogate and intimidate female staff. Recommendations to address these barriers include pt. will be cooperative with staff.       syndrome 09/27/2016     Priority: Medium     Essential hypertension      Priority: Medium     Gastroesophageal reflux disease without esophagitis 01/19/2016     Priority: Medium     Slow transit constipation 01/15/2016     Priority: Medium     Cholecystitis 12/16/2015     Priority: Medium     Cholelithiasis with acute on chronic cholecystitis 12/14/2015     Priority: Medium     Nausea and vomiting 12/08/2015     Priority: Medium     Hyperlipidemia 01/13/2015     Priority: Medium     Problem list name updated by automated process. Provider to review       Obesity 09/30/2014     Priority: Medium     Thoracic disc herniation 09/10/2014     Priority: Medium     Hypertension goal BP (blood pressure) < 140/90 08/12/2014     Priority: Medium     Depression with anxiety 08/12/2014     Priority: Medium     Constipation 08/12/2014     Priority: Medium     Tobacco abuse: [age]y/o on 8-12-14 @ 1ppd=50 pk yr hx  08/12/2014     Priority: Medium     Stasis dermatitis 08/12/2014     Priority: Medium     Mild cognitive impairment SLUMS = 22/ 30  CPT = 5.0/ 5.5 7-2014 07/28/2014     Priority: Medium     Health Care Home 07/15/2014     Priority: Medium     Intractable back pain 07/11/2014     Priority: Medium     Neck pain 06/11/2014     Priority: Medium     Cardiac pacemaker in situ 05/22/2014     Priority: Medium     CHF (congestive heart failure) (H) 05/09/2014     Priority: Medium     COPD (chronic obstructive pulmonary disease) (H) 04/28/2014     Priority: Medium     Elevated TSH 04/28/2014     Priority: Medium     Diplopia 04/12/2014     Priority: Medium     Asymptomatic cholelithiasis 01/03/2014     Priority: Medium     Melanosis of colon 01/29/2013     Priority: Medium     Pulmonary hypertension (H)      Priority: Medium     c diff colitis 11-12-12 11/13/2012     Priority: Medium     Adjustment reaction with anxiety and depression 11/01/2012     Priority: Medium     Pleural effusion, left 10/01/2012     Priority: Medium      Chronic low back pain 08/31/2012     Priority: Medium     Diabetes with proteinuria 11/09/2011     Priority: Medium     CKD (chronic kidney disease) stage 3, GFR 30-59 ml/min 08/10/2011     Priority: Medium     Anemia 06/07/2011     Priority: Medium     Type 2 diabetes mellitus with diabetic nephropathy (H) 12/07/2010     Priority: Medium     Permanent atrial fibrillation (H) 08/01/2008     Priority: Medium     S/P AV node ablation and pacemaker 10-25-12              ROS: A comprehensive ten point review of systems was negative aside from those in mentioned in the HPI.       MEDICATIONS:   Prior to Admission medications    Medication Sig Start Date End Date Taking? Authorizing Provider   lisinopril (PRINIVIL/ZESTRIL) 5 MG tablet Take 2 tablets (10 mg) by mouth daily   Yes Tip Sheppard APRN CNP   GLIPIZIDE PO Take 5 mg by mouth every morning (before breakfast)   Yes Unknown, Entered By History   furosemide (LASIX) 20 MG tablet TAKE 1-2 TABS BY MOUTH ONCE DAILY. MAY REPEAT AFTER NOON IF NEEDED FOR WEIGHT GAIN/2+ EDEMA 8/1/17  Yes Neisha Esparza MD   tiotropium (SPIRIVA HANDIHALER) 18 MCG capsule Inhale 1 capsule (18 mcg) into the lungs daily 8/1/17  Yes Neisha Esparza MD   traMADol (ULTRAM) 50 MG tablet Take 1 tablet (50 mg) by mouth every 6 hours as needed for moderate pain 6/26/17  Yes Neisha Esparza MD   simvastatin (ZOCOR) 10 MG tablet Take 1 tablet (10 mg) by mouth At Bedtime 5/31/17  Yes Neisha Esparza MD   apixaban ANTICOAGULANT (ELIQUIS) 5 MG tablet Take 1 tablet (5 mg) by mouth 2 times daily 5/12/17  Yes Jc Juarez MD   hydrOXYzine (VISTARIL) 25 MG capsule Take 1 capsule (25 mg) by mouth 2 times daily as needed for itching 4/25/17  Yes Neisha Esparza MD   fluticasone (FLONASE) 50 MCG/ACT spray INHALE 1 TO 2 SPRAYS INTO BOTH NOSTRILS DAILY 2/23/17  Yes Neisha Esparza MD   COMPOUND (CMPD RX) - PHARMACY TO MIX COMPOUNDED MEDICATION Vaginal valium- 10 mg suppository  11/8/16  Yes Merle Dominguez PA-C   fluocinolone (SYNALAR) 0.01 % external solution Apply topically daily as needed (itchy scalp)  9/19/16  Yes Reported, Patient   betamethasone valerate (VALISONE) 0.1 % cream Apply topically 2 times daily Use sparingly 9/27/16  Yes Neisha Esparza MD   hydrocortisone (ANUSOL-HC) 2.5 % rectal cream Place rectally 2 times daily as needed for hemorrhoids 9/27/16  Yes Neisha Esparza MD   Multiple Vitamins-Minerals (PRESERVISION/LUTEIN) CAPS Take 1 capsule by mouth daily    Yes Reported, Patient   nystatin (MYCOSTATIN) 679170 UNIT/GM POWD Apply topically 2 times daily as needed 9/1/15  Yes Neisha Esparza MD   albuterol (PROAIR HFA, PROVENTIL HFA, VENTOLIN HFA) 108 (90 BASE) MCG/ACT inhaler Inhale 2 puffs into the lungs every 4 hours as needed for shortness of breath / dyspnea or wheezing 2/10/15  Yes Neisha Esparza MD   magnesium hydroxide (MILK OF MAGNESIA) 400 MG/5ML suspension Take 30 mLs by mouth At Bedtime  8/20/14  Yes Reported, Patient   HYDROcodone-acetaminophen (NORCO) 5-325 MG per tablet Take 0.5-1 tablets by mouth every 6 hours as needed for moderate to severe pain maximum 2 tablet(s) per day 8/9/17   Tip Sheppard APRN CNP   ACCU-CHEK SMARTVIEW test strip 1 strip by In Vitro route 2 times daily Or as directed 4/8/16   Neisha sEparza MD   blood glucose monitoring (ACCU-CHEK FASTCLIX) lancets USE TO TEST BLOOD SUGAR TWO TIMES A DAY OR AS DIRECTED 4/8/16   Neisha Esparza MD   ORDER FOR DME, SET TO LOCAL PRINT, Equipment being ordered: AccuChek Smart View strips  Patient tests twice daily. 5/22/15   Neisha Esparza MD        ALLERGIES:   Allergies   Allergen Reactions     Penicillins Hives     Ambien [Zolpidem Tartrate]      Hallucinations and fell out of bed at night.     Definity      Caused a syncopal episode.     Sulfa Drugs Itching     Cymbalta Rash     Fluconazole Rash        SOCIAL HISTORY:  Social History   Substance Use Topics      Smoking status: Current Every Day Smoker     Packs/day: 0.50     Years: 45.00     Types: Cigarettes     Smokeless tobacco: Never Used      Comment: 01/25/15 6 or less cigarettes per day, intermittent     Alcohol use No        FAMILY HISTORY:  Family History   Problem Relation Age of Onset     Hypertension Mother      DIABETES Mother       at 83 yrs     C.A.D. Father       age 70s     Breast Cancer No family hx of      Colon Cancer No family hx of         PHYSICAL EXAM:     /57 (BP Location: Right arm)  Pulse 70  Temp 97.1  F (36.2  C) (Axillary)  Resp 18  Wt 77.3 kg (170 lb 6.7 oz)  LMP  (LMP Unknown)  SpO2 96%  BMI 28.36 kg/m2     PHYSICAL EXAM:  GENERAL:  In no distress  SKIN: no suspicious lesions, rashes  HEAD: Normocephalic. Atraumatic.  NECK: Neck supple. No adenopathy.   EYES: No scleral icterus  RESPIRATORY: Good transmission. CTA bilaterally.   CARDIOVASCULAR: RRR, normal S1, S2,  No murmur appreciated  GASTROINTESTINAL: +BS, soft, non tender, non distended, no guarding/rebound  JOINT/EXTREMITIES:  no gross deformities noted, normal muscle tone  NEURO: CN 2-12 grossly intact, no focal deficits, edema bilateral LE  PSYCH: Normal affect        ADDITIONAL COMMENTS:   I reviewed the patient's new clinical lab test results.   Recent Labs   Lab Test  08/10/17   0716  17   1720  17   1457   12/08/15   1705  12/03/15   0838   WBC  4.6  6.5  7.4   < >  5.4  9.6   HGB  8.1*  6.8*  6.4*  6.6*   < >  12.9  12.8   MCV  78  77*  79   < >  86  88   PLT  191  234  299   < >  202  174   INR   --   1.41*   --    --   1.21*  1.21*    < > = values in this interval not displayed.     Recent Labs   Lab Test  08/10/17   0716  17   1720  17   1457   POTASSIUM  4.0  4.3  4.5   CHLORIDE  105  102  102   CO2  26  24  23   BUN  21  24  24   ANIONGAP  8  9  9     Recent Labs   Lab Test  08/10/17   0716  17   1720  17   1640  17   1446  17   1445  10/28/16   1413    12/08/15   1705  12/03/15   0838  10/15/15   1509   ALBUMIN  3.0*  3.5   --    --   3.9   --    < >  3.2*  3.4  3.6   BILITOTAL  0.8  0.5   --    --   0.5   --    < >  0.9  2.8*  0.7   ALT  15  22   --    --   15   --    < >  161*  427*  43   AST  17  30   --    --   13   --    < >  67*  320*  53*   PROTEIN   --    --   Negative  100*   --   Negative   < >  10*   --    --    LIPASE   --    --    --    --    --    --    --   133  131  301    < > = values in this interval not displayed.           CONSULTATION ASSESSMENT AND PLAN:    Helene Gibbs is a 82 year old with medical history of atrial fibrillation on Eliquis, COPD, HTN, and DM type 2 who presented at the prompting of her PCP after finding a Hgb of 6.4 without evidence of overt GI bleeding.    1. Anemia, normocytic: HgB 6.4 on admit with MCV of 79. She received 2 units of PRBCs with HgB of 8.1 this morning. She has no abdominal pain melena/ hematochezia/ hematemesis but uses Eliquis. BUN is normal.  Given ongoing need for anticoagulation with A.fib, will complete an EGD this afternoon at 1:30. Possible PUD, gastritis/ esophagitis, AVMs.   -- Continue PPI BID  -- NPO for EGD this afternoon.  -- Serial HgB, transfusions per medicine.  -- Patient refuses further colonoscopy if indicated after EGD.        I discussed the patient plan with Dr. Bustamante. Thank you for asking us to participate in the care of this patient.    Mili Chirinos PA-C  [facility]

## 2023-12-17 NOTE — PROGRESS NOTES
64F hx of breast cancer on chemo with Dr. Blankenship at [facility], DM2, HLD, HTN here for dysuria and urinary urgency with flank pain concerning for pyelonephritis.    Symptoms began 3 days prior, last chemotherapy session on 12/12. Patient began having significant polyuria, to the point she was worried about incontinence, urgency, dysuria (stinging but not burning per patient). She has not had a hx of kidney stones and she has denies hematuria. However, she has had reduced PO intake over the past several days. She complains of nausea, but denies vomiting.    Patient denies a history of UTIs, BMs are regular, and she denies chest pain, shortness of breath/wheezing.    Upon arrival to  Mrs. Gupta is doing well. She has greatly benefited from the lymphedema pump. Her toe pain from ischemic rest pain has subsided. She has biphasic signal in the DP on the left.   I have reviewed the non invasive studies and discussed those with her in detail.   She can follow up PRN.    64F hx of breast cancer on chemo with Dr. Blankenship at [facility], DM2, HLD, HTN here for dysuria and urinary urgency with flank pain concerning for pyelonephritis.    Symptoms began 3 days prior, last chemotherapy session on 12/12. Patient began having significant polyuria, to the point she was worried about incontinence, urgency, dysuria (stinging but not burning per patient). She has not had a hx of kidney stones and she has denies hematuria. However, she has had reduced PO intake over the past several days. She complains of nausea, but denies vomiting.    Patient denies a history of UTIs, BMs are regular, and she denies chest pain, shortness of breath/wheezing.    Upon arrival to  64F hx of breast cancer on chemo with Dr. Blankenship at [facility], DM2, HLD, HTN here for dysuria and urinary urgency with flank pain concerning for pyelonephritis.    Symptoms began 3 days prior, last chemotherapy session on 12/12. Patient began having significant polyuria, to the point she was worried about incontinence, urgency, dysuria (stinging but not burning per patient). She has not had a hx of kidney stones and she has denies hematuria. However, she has had reduced PO intake over the past several days. She complains of nausea, but denies vomiting.    Patient denies a history of UTIs, BMs are regular, and she denies chest pain, shortness of breath/wheezing.    Upon arrival to the ED, she was given 1L NS and started on IV CTX. Patient had flank pain, worse on the L side and was given lidocaine patch and IV morphine. Patient's pain has mostly dissipated. CT Chest Abdomen Pelvis without any acute findings. 64F hx of breast cancer on chemo with Dr. Blankenship at [facility], DM2, HLD, HTN here for dysuria and urinary urgency with flank pain concerning for pyelonephritis.    Symptoms began 3 days prior, last chemotherapy session on 12/12. Patient began having significant polyuria, to the point she was worried about incontinence, urgency, dysuria (stinging but not burning per patient). She has not had a hx of kidney stones and she has denies hematuria. However, she has had reduced PO intake over the past several days. She complains of nausea, but denies vomiting.    Patient denies a history of UTIs, BMs are regular, and she denies chest pain, shortness of breath/wheezing.    Upon arrival to the ED, she was given 1L NS and started on IV CTX. Patient had flank pain, worse on the L side and was given lidocaine patch and IV morphine. Patient's pain has mostly dissipated. CT Chest Abdomen Pelvis without any acute findings.

## 2024-03-21 NOTE — LETTER
[facility] [address]  [place] [address]  Phone: [phone]  Fax: [phone]    May 4, 2018    Re: Helene Gibbs, : 1935    Mrs. Gibbs is here for follow up today. Her left foot feels better but of late she has developed significant lymphedema of both lower extremities. She has seen a therapist but daily wrapping and her planning to move soon presents a serious logistical challenge. Her diuretics have also been increased.   I believe she will significantly benefit from a lymphedema pump as it will certainly help walking now that her arterial disease has been addressed.   Her left ABIs are significantly improved and I will see her back in 3 months with resting ABIs.     Pieter Watson MD  
jaw surgery due to underbite     HERNIA REPAIR  incisional hernia-2012    with mesh    LIPOMA RESECTION Right     right foot between toes    AL UNLISTED PROCEDURE CARDIAC SURGERY  09/2018    cardioversion    AL UNLISTED PROCEDURE CARDIAC SURGERY  11/2018    ablation    AL UNLISTED PROCEDURE CARDIAC SURGERY  12/2018    cardioversion    AL UNLISTED PROCEDURE CARDIAC SURGERY  02/2019    ablation    REFRACTIVE SURGERY      TOTAL COLECTOMY Right 2010    8 in colon removed    TOTAL KNEE ARTHROPLASTY Right 01/29/2018    TOTAL KNEE ARTHROPLASTY Left 04/17/2019     Family History   Problem Relation Age of Onset    Colon Cancer Maternal Uncle     Thyroid Disease Daughter         Graves' disease    Thyroid Disease Maternal Aunt         Hypothyroidism    Cancer Mother         Lymphoma    Heart Disease Brother     Breast Cancer Mother     Thyroid Disease Mother         Hypothyroidism    Heart Disease Father         s/p MI and CABG    Hypertension Father     Heart Disease Brother     Thyroid Disease Sister         Hypothyroidism    Hypertension Mother      Social History     Tobacco Use   Smoking Status Never   Smokeless Tobacco Never      Social History     Substance and Sexual Activity   Alcohol Use No      Social History     Substance and Sexual Activity   Drug Use Never      Allergies as of 03/21/2024 - Fully Reviewed 03/21/2024   Allergen Reaction Noted    Latex Other (See Comments) 06/21/2022    Ciprofloxacin Other (See Comments) 05/30/2019    Adhesive tape Other (See Comments) 10/28/2022    Olanzapine-fluoxetine hcl Other (See Comments) 01/25/2016    Cefaclor Rash 04/26/2010       Review of Systems    Objective:    Vitals:    03/21/24 1511   BP: 136/88   Site: Left Upper Arm   Position: Sitting   Pulse: 83   SpO2: 97%   Weight: 120.3 kg (265 lb 3.2 oz)   Height: 1.6 m (5' 3\")        Physical Exam  Constitutional:       General: She is not in acute distress.     Appearance: Normal appearance.   HENT:      Head:

## 2025-05-12 NOTE — ED NOTES
Presents with swelling to right lower leg.  Has increased redness and warmth to lower leg.  Denies any fluid weeping.  Denies pain to touch.  Is able to ambulate with walker.  
Up to BR with walker and stand-by assist.  No gait problems.  Denies pain to left leg while walking.  
Went over discharge instructions.  Verbally understands.  All questions answered.  
4

## 2025-05-13 NOTE — PROGRESS NOTES
SUBJECTIVE:                                                    Helene Gibbs is a 82 year old female who presents to clinic today for the following health issues:      Hospital Follow-up Visit:    Hospital/Nursing Home/IP Rehab Facility: [facility]  Date of Admission: 8/9/17  Date of Discharge: 8/11/17  Reason(s) for Admission: low hemoglocin            Problems taking medications regularly:  None       Medication changes since discharge: yes       Problems adhering to non-medication therapy:  None    Summary of hospitalization:  [facility] discharge summary reviewed  Diagnostic Tests/Treatments reviewed.  Follow up needed: yes by hematologist  Other Healthcare Providers Involved in Patient s Care:         Care Coordination  Update since discharge: improved.     Post Discharge Medication Reconciliation: discharge medications reconciled and changed, per note/orders (see AVS).  Plan of care communicated with patient     Coding guidelines for this visit:  Type of Medical   Decision Making Face-to-Face Visit       within 7 Days of discharge Face-to-Face Visit        within 14 days of discharge   Moderate Complexity [id number]   High Complexity [id number]            Edema      Duration: 2 weeks    Description (location/character/radiation): bi-lateral edema    Intensity:  severe    Accompanying signs and symptoms: numbness    History (similar episodes/previous evaluation): None    Precipitating or alleviating factors: None    Therapies tried and outcome: None     Pt has a  who brought her to her appointment today  Reports that she is feeling somewhat better since last hospital visit-far less exhausted, but is still dealing with the stress of her situation  Pt is still experiencing pain in her left shoulder and does notget relief from tylenol-however Norco does seem to help-pt says she cuts pills in half  Reports that she does not eat as many salty snacks as in the past-trying to lose  "weight and wants to lose 10 more pounds  Says her blood sugars have been normal, but she doesn't check them very frequently  Feels like she is \"walking on sponges\" due to fluid that leaks from her feet  Pt reports that medical marijuana did not provide her any relief and left her feeling groggy    Problem list and histories reviewed & adjusted, as indicated.  Additional history: as documented    Patient Active Problem List   Diagnosis     Type 2 diabetes mellitus with diabetic nephropathy (H)     Anemia     CKD (chronic kidney disease) stage 3, GFR 30-59 ml/min     Diabetes with proteinuria     Chronic low back pain     Pleural effusion, left     Adjustment reaction with anxiety and depression     c diff colitis 11-12-12     Pulmonary hypertension (H)     Melanosis of colon     Asymptomatic cholelithiasis     Diplopia     COPD (chronic obstructive pulmonary disease) (H)     Elevated TSH     CHF (congestive heart failure) (H)     Cardiac pacemaker in situ     Neck pain     Intractable back pain     Health Care Home     Mild cognitive impairment SLUMS = 22/ 30  CPT = 5.0/ 5.5 7-2014     Hypertension goal BP (blood pressure) < 140/90     Depression with anxiety     Constipation     Tobacco abuse: [age]y/o on 8-12-14 @ 1ppd=50 pk yr hx      Stasis dermatitis     Thoracic disc herniation     Obesity     Hyperlipidemia     Nausea and vomiting     Cholelithiasis with acute on chronic cholecystitis     Cholecystitis     Slow transit constipation     Gastroesophageal reflux disease without esophagitis     Permanent atrial fibrillation (H)     Essential hypertension     Basal cell carcinoma of skin     Controlled substance agreement signed     Chronic pain syndrome     Tubular adenoma     Shoulder pain, left     Past Surgical History:   Procedure Laterality Date     ARTHROSCOPY SHOULDER RT/LT  1999, 2004    Bilateral, right then left     C DEXA, BONE DENSITY, AXIAL SKEL       C MAMMOGRAM, SCREENING  1/2009     COLONOSCOPY N/A " 10/7/2016    Procedure: COMBINED COLONOSCOPY, SINGLE OR MULTIPLE BIOPSY/POLYPECTOMY BY BIOPSY;  Surgeon: Cassandra Mccord MD;  Location:  GI     ESOPHAGOSCOPY, GASTROSCOPY, DUODENOSCOPY (EGD), COMBINED N/A 8/10/2017    Procedure: COMBINED ESOPHAGOSCOPY, GASTROSCOPY, DUODENOSCOPY (EGD), BIOPSY SINGLE OR MULTIPLE;  gastroscopy;  Surgeon: Helene Bustamante MD;  Location:  GI     H ABLATION AV NODE  10/2012     HC COLONOSCOPY THRU STOMA, DIAGNOSTIC  ? 2005     IMPLANT PACEMAKER  10/2012    [place] [id number], [id number]     JOINT REPLACEMTN, KNEE RT/LT      Joint Replacement knee bilateral     LAPAROSCOPIC CHOLECYSTECTOMY WITH CHOLANGIOGRAMS N/A 2015    Procedure: LAPAROSCOPIC CHOLECYSTECTOMY WITH CHOLANGIOGRAMS;  Surgeon: Ervin Amos MD;  Location:  OR     LUMPECTOMY BREAST      Left-     PHACOEMULSIFICATION CLEAR CORNEA WITH STANDARD INTRAOCULAR LENS IMPLANT Left 2015    Procedure: PHACOEMULSIFICATION CLEAR CORNEA WITH STANDARD INTRAOCULAR LENS IMPLANT;  Surgeon: Sai Obregon MD;  Location: [facility]     PHACOEMULSIFICATION CLEAR CORNEA WITH TORIC INTRAOCULAR LENS IMPLANT Right 2017    Procedure: PHACOEMULSIFICATION CLEAR CORNEA WITH TORIC INTRAOCULAR LENS IMPLANT;  RIGHT EYE PHACOEMULSIFICATION CLEAR CORNEA WITH TORIC INTRAOCULAR LENS IMPLANT ;  Surgeon: Duc Richmond MD;  Location: [facility]       Social History   Substance Use Topics     Smoking status: Current Every Day Smoker     Packs/day: 0.50     Years: 45.00     Types: Cigarettes     Smokeless tobacco: Never Used      Comment: 01/25/15 6 or less cigarettes per day, intermittent     Alcohol use No     Family History   Problem Relation Age of Onset     Hypertension Mother      DIABETES Mother       at 83 yrs     C.A.D. Father       age 70s     Breast Cancer No family hx of      Colon Cancer No family hx of          Current Outpatient Prescriptions   Medication Sig Dispense Refill     HYDROcodone-acetaminophen  (NORCO) 5-325 MG per tablet Take 0.5-1 tablets by mouth every 8 hours as needed for moderate to severe pain maximum 2 tablet(s) per day 30 tablet 0     lisinopril (PRINIVIL/ZESTRIL) 5 MG tablet Take 2 tablets (10 mg) by mouth daily       GLIPIZIDE PO Take 5 mg by mouth every morning (before breakfast)       furosemide (LASIX) 20 MG tablet TAKE 1-2 TABS BY MOUTH ONCE DAILY. MAY REPEAT AFTER NOON IF NEEDED FOR WEIGHT GAIN/2+ EDEMA 180 tablet 1     tiotropium (SPIRIVA HANDIHALER) 18 MCG capsule Inhale 1 capsule (18 mcg) into the lungs daily 30 capsule 3     traMADol (ULTRAM) 50 MG tablet Take 1 tablet (50 mg) by mouth every 6 hours as needed for moderate pain 100 tablet 1     simvastatin (ZOCOR) 10 MG tablet Take 1 tablet (10 mg) by mouth At Bedtime 90 tablet 1     hydrOXYzine (VISTARIL) 25 MG capsule Take 1 capsule (25 mg) by mouth 2 times daily as needed for itching 180 capsule 1     fluticasone (FLONASE) 50 MCG/ACT spray INHALE 1 TO 2 SPRAYS INTO BOTH NOSTRILS DAILY 16 mL 3     COMPOUND (CMPD RX) - PHARMACY TO MIX COMPOUNDED MEDICATION Vaginal valium- 10 mg suppository 42 suppository 3     fluocinolone (SYNALAR) 0.01 % external solution Apply topically daily as needed (itchy scalp)   1     betamethasone valerate (VALISONE) 0.1 % cream Apply topically 2 times daily Use sparingly 15 g 1     hydrocortisone (ANUSOL-HC) 2.5 % rectal cream Place rectally 2 times daily as needed for hemorrhoids 28 g 1     ACCU-CHEK SMARTVIEW test strip 1 strip by In Vitro route 2 times daily Or as directed 100 each 1     blood glucose monitoring (ACCU-CHEK FASTCLIX) lancets USE TO TEST BLOOD SUGAR TWO TIMES A DAY OR AS DIRECTED 1 Box 11     Multiple Vitamins-Minerals (PRESERVISION/LUTEIN) CAPS Take 1 capsule by mouth daily        nystatin (MYCOSTATIN) 712548 UNIT/GM POWD Apply topically 2 times daily as needed 60 g 1     ORDER FOR DME, SET TO LOCAL PRINT, Equipment being ordered: AccuChek Smart View strips  Patient tests twice daily. 1  "each 11     albuterol (PROAIR HFA, PROVENTIL HFA, VENTOLIN HFA) 108 (90 BASE) MCG/ACT inhaler Inhale 2 puffs into the lungs every 4 hours as needed for shortness of breath / dyspnea or wheezing 1 Inhaler 5     magnesium hydroxide (MILK OF MAGNESIA) 400 MG/5ML suspension Take 30 mLs by mouth At Bedtime        Allergies   Allergen Reactions     Penicillins Hives     Ambien [Zolpidem Tartrate]      Hallucinations and fell out of bed at night.     Definity      Caused a syncopal episode.     Sulfa Drugs Itching     Cymbalta Rash     Fluconazole Rash     Labs reviewed in EPIC      Reviewed and updated as needed this visit by clinical staffTobacco  Allergies  Meds  Soc Hx      Reviewed and updated as needed this visit by Provider         ROS:  Constitutional, neuro, ENT, endocrine, pulmonary, cardiac, gastrointestinal, genitourinary, musculoskeletal, integument and psychiatric systems are negative, except as otherwise noted.    This document serves as a record of the services and decisions personally performed and made by Neisha Esparza MD. It was created on her behalf by Sally Rush, a trained medical scribe. The creation of this document is based the provider's statements to the medical scribe.    [place] Rush 1:52 PM, August 14, 2017      OBJECTIVE:                                                    /70 (BP Location: Left arm, Patient Position: Sitting)  Pulse 70  Temp 98.2  F (36.8  C) (Tympanic)  Ht 1.651 m (5' 5\")  Wt 74.8 kg (165 lb)  LMP  (LMP Unknown)  SpO2 100%  BMI 27.46 kg/m2  Body mass index is 27.46 kg/(m^2).      GENERAL APPEARANCE: healthy, alert and no distress  RESP: lungs clear to auscultation - no rales, rhonchi or wheezes  CV: loud heart murmur  JOINT/EXTREMITIES: extremities normal- no gross deformities noted, gait normal, normal muscle tone and improving edema to the legs  Diabetic foot exam was performed.  Good dorsalis pedis and posterior tibial.  5 point sensory exam " was normal.  skin is intact  Patient has bilateral edema  She takes diuretics  Results for orders placed or performed in visit on 08/14/17 (from the past 24 hour(s))   Hemoglobin   Result Value Ref Range    Hemoglobin 9.1 (L) 11.7 - 15.7 g/dL     *Note: Due to a large number of results and/or encounters for the requested time period, some results have not been displayed. A complete set of results can be found in Results Review.              ASSESSMENT/PLAN:                                                      Helene was seen today for edema.    Diagnoses and all orders for this visit:      Anemia, unspecified type  -     Hemoglobin   -Still low at 9.1  -     ONC/HEME ADULT REFERRAL  Etiology of anemia is unclear  EGD was negative  Discussed with hospitalist who discharged the patient   Recommended hematology referral   Patient agreed  eliquis is on hold  Hemoglobin today is 9.1  Recheck hemoglobin in one week  Once hemoglobin is > 10 then resume eliquis  Discussed the risk/benefit    CKD (chronic kidney disease) stage 3, GFR 30-59 ml/min  -     Basic metabolic panel    Chronic diastolic congestive heart failure (H)  Well compensated    Tricuspid valve insufficiency, unspecified etiology  Seen on echocardiogram    Permanent atrial fibrillation (H)  Heart rate under control  Eliquis is on hold due to recent bleed and anemia  Once hemoglobin improves then resume it     Acute pain of left shoulder and pain due to neuropathy:  -     HYDROcodone-acetaminophen (NORCO) 5-325 MG per tablet; Take 0.5-1 tablets by mouth every 8 hours as needed for moderate to severe pain maximum 2 tablet(s) per day  Patient was educated about opiod pain medication. It can be habit-forming. It should be taken as prescribed. Do not mix it  with alcohol. Be careful with driving.Do not loose the  Prescription.  Do not overuse this medication. It is a controlled substance.  Patient says pain is intolerable  She quit medical marijuana as that does  not work  She uses half tablet at time and it works  She uses this for neuropathy pain also    Type 2 diabetes:  Lab Results   Component Value Date    A1C 6.7 08/10/2017    A1C 6.4 04/25/2017    A1C 6.2 09/27/2016    A1C 6.0 03/11/2016    A1C 6.1 01/03/2016         See pt instructions    Patient Instructions   Labs today  Make appointment with hematology  Continue to monitor your sugars daily  Update us in 2 weeks with your blood sugar readings  Follow up in one month  Seek sooner medical attention if there is any worsening of symptoms or problems.         The information in this document, created by the medical scribe for me, accurately reflects the services I personally performed and the decisions made by me. I have reviewed and approved this document for accuracy prior to leaving the patient care area.  Neisha Esparza MD  1:53 PM, 08/14/17      Neisha Esparza MD  [facility]     No

## (undated) DEVICE — GLOVE PROTEXIS MICRO 8.0  2D73PM80

## (undated) DEVICE — BLADE KNIFE BEAVER 60DEG BEAVER6600

## (undated) DEVICE — SU PDS II 2-0 CT-2 27"  Z333H

## (undated) DEVICE — EYE SHIELD PLASTIC

## (undated) DEVICE — DRAIN ROUND W/RESERV KIT JACKSON PRATT 10FR 400ML SU130-402D

## (undated) DEVICE — SPECIMEN CONTAINER 60MLW/10% FORMALIN 59601

## (undated) DEVICE — SPECIMEN CULTURETTE DBL SWAB 220109

## (undated) DEVICE — SUCTION IRR SYSTEM W/O TIP INTERPULSE HANDPIECE 0210-100-000

## (undated) DEVICE — PIN STEINMAN 1/8"

## (undated) DEVICE — SU VICRYL 0 CT-2 27" J334H

## (undated) DEVICE — PEN MARKING SKIN

## (undated) DEVICE — PREP CHLORAPREP 26ML TINTED ORANGE  260815

## (undated) DEVICE — SU VICRYL 1 CTX CR 8X18" J765D

## (undated) DEVICE — PREP SKIN SCRUB TRAY 4461A

## (undated) DEVICE — ESU GROUND PAD UNIVERSAL W/O CORD

## (undated) DEVICE — PACK TOTAL HIP W/U DRAPE SOP15HUFSC

## (undated) DEVICE — DRSG XEROFORM 1X8"

## (undated) DEVICE — CANISTER WOUND VAC W/GEL 500ML M8275063/5

## (undated) DEVICE — PACK CATARACT CUSTOM SO DALE SEY32CTFCX

## (undated) DEVICE — SU ETHILON 3-0 PS-2 18" 1669H

## (undated) DEVICE — DRSG WOUND VAC GRANUFOAM MED SILVER M8275096/5

## (undated) DEVICE — SOL BENZOIN 0.5OZ

## (undated) DEVICE — EYE PACK BVI READYPAK KIT #1

## (undated) DEVICE — SU ETHIBOND 2 V-37 4X30" MX69G

## (undated) DEVICE — PACK TOTAL HIP W/POUCH SOP15HPFSM

## (undated) DEVICE — SU PDS II 0 CT 36" Z358T

## (undated) DEVICE — LINEN TOWEL PACK X5 5464

## (undated) DEVICE — PAD FOAM MCGUIRE KIT 0814-0150

## (undated) DEVICE — GLOVE PROTEXIS W/NEU-THERA 8.5  2D73TE85

## (undated) DEVICE — SOLUTION WOUND CLEANSING 3/4OZ 10% PVP EA-L3011FB-50

## (undated) DEVICE — GLOVE PROTEXIS BLUE W/NEU-THERA 8.0  2D73EB80

## (undated) DEVICE — BLADE SAW SAGITTAL STRK 25X79.5X1.24MM 4/2000 2108-318-000

## (undated) DEVICE — SU VICRYL 2-0 CT-1 27" UND J259H

## (undated) DEVICE — GLOVE PROTEXIS MICRO 7.0  2D73PM70

## (undated) DEVICE — EYE SOL BSS 500ML BAG 0065-1795-04

## (undated) DEVICE — IMM PILLOW ABDUCT HIP MED 31143061

## (undated) DEVICE — SOL NACL 0.9% IRRIG 1000ML BOTTLE 07138-09

## (undated) DEVICE — SOL WATER IRRIG 1000ML BOTTLE 2F7114

## (undated) DEVICE — MANIFOLD NEPTUNE 4 PORT 700-20

## (undated) DEVICE — GLOVE PROTEXIS POWDER FREE 8.5 ORTHOPEDIC 2D73ET85

## (undated) DEVICE — EYE PACK CUSTOM ANTERIOR 30DEG TIP CENTURION PPK6682-04

## (undated) DEVICE — SU FIBERWIRE 2 38"  AR-7200

## (undated) DEVICE — SPONGE LAP 18X18" X8435

## (undated) DEVICE — GLOVE PROTEXIS W/NEU-THERA 8.0  2D73TE80

## (undated) DEVICE — GLOVE PROTEXIS MICRO 6.5  2D73PM65

## (undated) DEVICE — GLOVE PROTEXIS W/NEU-THERA 7.5  2D73TE75

## (undated) DEVICE — DRSG KERLIX FLUFFS X5

## (undated) DEVICE — BONE CEMENT MIXEVAC HI VAC W/CARTRIDGE 0306-563-000

## (undated) DEVICE — DRAPE IOBAN INCISE 23X17" 6650EZ

## (undated) DEVICE — IMM PILLOW ABDUCT HIP MED

## (undated) DEVICE — LIGHT FLEX 3034A

## (undated) DEVICE — EYE KNIFE SLIT XSTAR VISITEC 2.5MM 45DEG BEVEL UP 373725

## (undated) DEVICE — BONE CLEANING TIP INTERPULSE FEMORAL CANAL 0210-008-000

## (undated) DEVICE — ESU PENCIL W/SMOKE EVAC NEPTUNE STRYKER 0703-046-000

## (undated) DEVICE — DRSG AQUACEL AG 3.5X6.0" HYDROFIBER 412010

## (undated) DEVICE — DRSG STERI STRIP 1/2X4" R1547

## (undated) RX ORDER — FENTANYL CITRATE 50 UG/ML
INJECTION, SOLUTION INTRAMUSCULAR; INTRAVENOUS
Status: DISPENSED
Start: 2017-08-10

## (undated) RX ORDER — LIDOCAINE HYDROCHLORIDE 10 MG/ML
INJECTION, SOLUTION EPIDURAL; INFILTRATION; INTRACAUDAL; PERINEURAL
Status: DISPENSED
Start: 2019-01-01

## (undated) RX ORDER — ONDANSETRON 2 MG/ML
INJECTION INTRAMUSCULAR; INTRAVENOUS
Status: DISPENSED
Start: 2018-01-01

## (undated) RX ORDER — FENTANYL CITRATE 50 UG/ML
INJECTION, SOLUTION INTRAMUSCULAR; INTRAVENOUS
Status: DISPENSED
Start: 2018-01-01

## (undated) RX ORDER — METOPROLOL TARTRATE 25 MG/1
TABLET, FILM COATED ORAL
Status: DISPENSED
Start: 2018-01-01

## (undated) RX ORDER — ATROPINE SULFATE 0.1 MG/ML
INJECTION INTRAVENOUS
Status: DISPENSED
Start: 2019-01-01

## (undated) RX ORDER — LIDOCAINE HYDROCHLORIDE 20 MG/ML
INJECTION, SOLUTION EPIDURAL; INFILTRATION; INTRACAUDAL; PERINEURAL
Status: DISPENSED
Start: 2018-01-01

## (undated) RX ORDER — EPINEPHRINE 1 MG/ML
INJECTION, SOLUTION INTRAMUSCULAR; SUBCUTANEOUS
Status: DISPENSED
Start: 2018-01-01

## (undated) RX ORDER — LIDOCAINE HYDROCHLORIDE 10 MG/ML
INJECTION, SOLUTION EPIDURAL; INFILTRATION; INTRACAUDAL; PERINEURAL
Status: DISPENSED
Start: 2017-05-09

## (undated) RX ORDER — REGADENOSON 0.08 MG/ML
INJECTION, SOLUTION INTRAVENOUS
Status: DISPENSED
Start: 2018-05-07

## (undated) RX ORDER — FLUMAZENIL 0.1 MG/ML
INJECTION, SOLUTION INTRAVENOUS
Status: DISPENSED
Start: 2019-01-01

## (undated) RX ORDER — HYDROMORPHONE HYDROCHLORIDE 1 MG/ML
INJECTION, SOLUTION INTRAMUSCULAR; INTRAVENOUS; SUBCUTANEOUS
Status: DISPENSED
Start: 2018-01-01

## (undated) RX ORDER — LIDOCAINE HYDROCHLORIDE AND EPINEPHRINE 10; 10 MG/ML; UG/ML
INJECTION, SOLUTION INFILTRATION; PERINEURAL
Status: DISPENSED
Start: 2017-08-29

## (undated) RX ORDER — PROPOFOL 10 MG/ML
INJECTION, EMULSION INTRAVENOUS
Status: DISPENSED
Start: 2017-05-09

## (undated) RX ORDER — PROPOFOL 10 MG/ML
INJECTION, EMULSION INTRAVENOUS
Status: DISPENSED
Start: 2018-01-01

## (undated) RX ORDER — DEXAMETHASONE SODIUM PHOSPHATE 4 MG/ML
INJECTION, SOLUTION INTRA-ARTICULAR; INTRALESIONAL; INTRAMUSCULAR; INTRAVENOUS; SOFT TISSUE
Status: DISPENSED
Start: 2018-01-01

## (undated) RX ORDER — GLYCOPYRROLATE 0.2 MG/ML
INJECTION, SOLUTION INTRAMUSCULAR; INTRAVENOUS
Status: DISPENSED
Start: 2018-01-01

## (undated) RX ORDER — LIDOCAINE HYDROCHLORIDE 40 MG/ML
SOLUTION TOPICAL
Status: DISPENSED
Start: 2019-01-01

## (undated) RX ORDER — HYDROMORPHONE HYDROCHLORIDE 1 MG/ML
INJECTION, SOLUTION INTRAMUSCULAR; INTRAVENOUS; SUBCUTANEOUS
Status: DISPENSED
Start: 2018-01-04

## (undated) RX ORDER — BUPIVACAINE HYDROCHLORIDE 2.5 MG/ML
INJECTION, SOLUTION EPIDURAL; INFILTRATION; INTRACAUDAL
Status: DISPENSED
Start: 2018-01-01

## (undated) RX ORDER — VANCOMYCIN HYDROCHLORIDE 1 G/20ML
INJECTION, POWDER, LYOPHILIZED, FOR SOLUTION INTRAVENOUS
Status: DISPENSED
Start: 2018-01-01

## (undated) RX ORDER — FENTANYL CITRATE 50 UG/ML
INJECTION, SOLUTION INTRAMUSCULAR; INTRAVENOUS
Status: DISPENSED
Start: 2017-05-09

## (undated) RX ORDER — ALBUMIN, HUMAN INJ 5% 5 %
SOLUTION INTRAVENOUS
Status: DISPENSED
Start: 2018-01-01

## (undated) RX ORDER — ONDANSETRON 2 MG/ML
INJECTION INTRAMUSCULAR; INTRAVENOUS
Status: DISPENSED
Start: 2017-05-09

## (undated) RX ORDER — LIDOCAINE HYDROCHLORIDE 20 MG/ML
INJECTION, SOLUTION EPIDURAL; INFILTRATION; INTRACAUDAL; PERINEURAL
Status: DISPENSED
Start: 2017-05-09

## (undated) RX ORDER — CLINDAMYCIN PHOSPHATE 900 MG/50ML
INJECTION, SOLUTION INTRAVENOUS
Status: DISPENSED
Start: 2018-01-01

## (undated) RX ORDER — NALOXONE HYDROCHLORIDE 0.4 MG/ML
INJECTION, SOLUTION INTRAMUSCULAR; INTRAVENOUS; SUBCUTANEOUS
Status: DISPENSED
Start: 2019-01-01

## (undated) RX ORDER — GLYCOPYRROLATE 0.2 MG/ML
INJECTION, SOLUTION INTRAMUSCULAR; INTRAVENOUS
Status: DISPENSED
Start: 2019-01-01

## (undated) RX ORDER — NEOSTIGMINE METHYLSULFATE 1 MG/ML
VIAL (ML) INJECTION
Status: DISPENSED
Start: 2018-01-01

## (undated) RX ORDER — FENTANYL CITRATE 50 UG/ML
INJECTION, SOLUTION INTRAMUSCULAR; INTRAVENOUS
Status: DISPENSED
Start: 2019-01-01